# Patient Record
Sex: MALE | Race: WHITE | NOT HISPANIC OR LATINO | URBAN - METROPOLITAN AREA
[De-identification: names, ages, dates, MRNs, and addresses within clinical notes are randomized per-mention and may not be internally consistent; named-entity substitution may affect disease eponyms.]

---

## 2017-01-03 ENCOUNTER — EMERGENCY (EMERGENCY)
Facility: HOSPITAL | Age: 52
LOS: 1 days | Discharge: HOME | End: 2017-01-03
Admitting: EMERGENCY MEDICINE
Payer: MEDICARE

## 2017-01-03 VITALS
TEMPERATURE: 98 F | RESPIRATION RATE: 18 BRPM | SYSTOLIC BLOOD PRESSURE: 148 MMHG | HEART RATE: 95 BPM | DIASTOLIC BLOOD PRESSURE: 97 MMHG | OXYGEN SATURATION: 96 %

## 2017-01-03 PROCEDURE — 90792 PSYCH DIAG EVAL W/MED SRVCS: CPT

## 2017-01-03 RX ADMIN — Medication 2 MILLIGRAM(S): at 23:25

## 2017-01-03 NOTE — ED BEHAVIORAL HEALTH ASSESSMENT NOTE - DESCRIPTION
calm and cooperative, given PO medication with good effect. NIDDM Lives with family in private home, mother in assisted living facility

## 2017-01-03 NOTE — ED BEHAVIORAL HEALTH ASSESSMENT NOTE - SUICIDE PROTECTIVE FACTORS
Future oriented/Positive therapeutic relationships/Responsibility to family and others/Supportive social network or family/Identifies reasons for living

## 2017-01-03 NOTE — ED BEHAVIORAL HEALTH ASSESSMENT NOTE - RISK ASSESSMENT
low acute risk . started back on meds, in outpt tx, not abusing substances, not endorsing si/hi, voluntarily in treatment.   Protective factors: No access to firearms, supportive family, compliant with treatment, positive therapeutic interactions

## 2017-01-03 NOTE — ED ADULT TRIAGE NOTE - CHIEF COMPLAINT QUOTE
Pt comes in for mental health eval, reports he stopped taking his psych meds x2 months ago and was restarted on them today. Pt reports increased anxiety at home and wanted to be evaluated. Pt also with c/o right side hip and back pain x few hours.

## 2017-01-03 NOTE — ED BEHAVIORAL HEALTH ASSESSMENT NOTE - CURRENT MEDICATION
Haldol 5mg QAM, Haldol 10mg QHS, Paxil 20mg QHS, Wellbutrin XL 150mg QD, Wellbutrin XL 300mg QD, Amlodopine 10mg QD, Nicoderm CQ 21mg. Q24 hours, Levothyroxine 50mcgs QD, Metformin 500mg BID  (per prior records)   trilafon, seroquel

## 2017-01-03 NOTE — ED BEHAVIORAL HEALTH NOTE - BEHAVIORAL HEALTH NOTE
Spoke with Sister Brittany Arias ([phone])- She stated patient was fine this AM but then called her at 5pm stating he felt anxious. He admitted to not taking his medication and so they called Dr. Cook at the [facility]. Patient admitted to not taking medication for 2 months and so Dr. Cook recommended patient restart Seroquel at 100mg QHS and Trilafon 16mg QHS and see her on Thursday.  She said patient was fine with this plan. Then a few hours later her other sister called her stating the patient was extremely anxious and said he was afraid to be alone. Patient didn't make any suicidal or homicidal statements but stated due to his state of anxiety he was not in his right mind and felt afraid and so they came to the ER.     She has not witnessed nor has patient endorsed suicidal or homicidal statements, psychotic symptoms or manic symptoms. 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 Patient has been caring for self but has had a decreased appetite. He has chronic sleep issues. Sister has no imminent safety concerns and just wants patient to feel ok going home. She reports she would prefer patient not be admitted to [facility] because she wants him to be comfortable outside the hospital environment but if he needs to be admitted she is ok with this. She said it would be good if the patient could be given something to make him feel more calm otherwise he will likely return to the ER if discharged.

## 2017-01-03 NOTE — ED BEHAVIORAL HEALTH ASSESSMENT NOTE - SUMMARY
51 year old single, non-caregiver  male, domiciled with sister and brother in law in a private house in [facility],  being treated at [facility], history of Schizoaffective Disorder, depressive type R/O Major Depressive Disorder, Social Phobia medical history of NIDDM, Hypogammaglobulinemia, R/O Hypothyroidism, last hospitalized at [facility] in 2012, no history of suicide attempts, bib sister secondary to anxiety .   while anxious in the setting of noncompliance with meds, the pt responds well to po anxiolytic in ED, he is not suicidal/homicidal/aggressive/agitated, denies manic/psychotic sx's, denies feeling hopeless or suicidal. pt has outpatient treatment in place, has appt. with dr. mccauley in two days, has support system with his sisters, there is no elevated acute risk or danger, there is not an indication for hospitalization, pt is fit to be discharged and f/u as an outpt later this week.

## 2017-01-18 ENCOUNTER — APPOINTMENT (OUTPATIENT)
Dept: RHEUMATOLOGY | Facility: CLINIC | Age: 52
End: 2017-01-18

## 2017-02-13 ENCOUNTER — APPOINTMENT (OUTPATIENT)
Dept: ALLERGY | Facility: CLINIC | Age: 52
End: 2017-02-13

## 2017-02-15 ENCOUNTER — APPOINTMENT (OUTPATIENT)
Dept: RHEUMATOLOGY | Facility: CLINIC | Age: 52
End: 2017-02-15

## 2017-03-09 ENCOUNTER — APPOINTMENT (OUTPATIENT)
Dept: ALLERGY | Facility: CLINIC | Age: 52
End: 2017-03-09

## 2017-03-09 VITALS
WEIGHT: 220 LBS | HEART RATE: 72 BPM | DIASTOLIC BLOOD PRESSURE: 80 MMHG | HEIGHT: 74 IN | RESPIRATION RATE: 14 BRPM | BODY MASS INDEX: 28.23 KG/M2 | SYSTOLIC BLOOD PRESSURE: 130 MMHG

## 2017-03-09 RX ORDER — PAROXETINE HYDROCHLORIDE 40 MG/1
TABLET, FILM COATED ORAL
Refills: 0 | Status: ACTIVE | COMMUNITY

## 2017-03-09 RX ORDER — FENOFIBRATE 145 MG/1
TABLET ORAL
Refills: 0 | Status: ACTIVE | COMMUNITY

## 2017-03-15 ENCOUNTER — APPOINTMENT (OUTPATIENT)
Dept: RHEUMATOLOGY | Facility: CLINIC | Age: 52
End: 2017-03-15

## 2017-03-24 ENCOUNTER — APPOINTMENT (OUTPATIENT)
Dept: RHEUMATOLOGY | Facility: CLINIC | Age: 52
End: 2017-03-24

## 2017-03-26 ENCOUNTER — EMERGENCY (EMERGENCY)
Facility: HOSPITAL | Age: 52
LOS: 1 days | Discharge: HOME | End: 2017-03-26
Attending: EMERGENCY MEDICINE | Admitting: EMERGENCY MEDICINE
Payer: MEDICARE

## 2017-03-26 VITALS
HEART RATE: 101 BPM | OXYGEN SATURATION: 99 % | RESPIRATION RATE: 20 BRPM | DIASTOLIC BLOOD PRESSURE: 101 MMHG | SYSTOLIC BLOOD PRESSURE: 156 MMHG

## 2017-03-26 VITALS
TEMPERATURE: 98 F | RESPIRATION RATE: 16 BRPM | DIASTOLIC BLOOD PRESSURE: 97 MMHG | OXYGEN SATURATION: 100 % | SYSTOLIC BLOOD PRESSURE: 177 MMHG | HEART RATE: 104 BPM

## 2017-03-26 PROCEDURE — 90792 PSYCH DIAG EVAL W/MED SRVCS: CPT

## 2017-03-26 PROCEDURE — 93010 ELECTROCARDIOGRAM REPORT: CPT

## 2017-03-26 PROCEDURE — 99284 EMERGENCY DEPT VISIT MOD MDM: CPT | Mod: 25

## 2017-03-26 RX ADMIN — Medication 1 MILLIGRAM(S): at 10:30

## 2017-03-26 NOTE — ED ADULT TRIAGE NOTE - CHIEF COMPLAINT QUOTE
states" I want to see psychiatrist". feel very anxious. h/o psych, compliant with meds. denies SI/HI

## 2017-03-26 NOTE — ED BEHAVIORAL HEALTH ASSESSMENT NOTE - SUICIDE PROTECTIVE FACTORS
Responsibility to family and others/Positive therapeutic relationships/Identifies reasons for living/Future oriented/Supportive social network or family

## 2017-03-26 NOTE — ED ADULT NURSE NOTE - OBJECTIVE STATEMENT
Hx of BiPolar and MDD, states he is compliant with medications, presents stating "I feel like I'm going to die." Pt endorses no medical symptoms, unable to explain why he feels this way,  denies chest pain, SOB, n/v, dizziness, palpitations. Resp even and unlabored. Pt denies SI, HI, auditory or visual hallucinations.

## 2017-03-26 NOTE — ED PROVIDER NOTE - EYES, MLM
Clear bilaterally, pupils equal, round and reactive to light. CARMELO, EOMI. Neg: Clark Sign, Raccoon Eyes, otorhinorrhea.

## 2017-03-26 NOTE — ED BEHAVIORAL HEALTH ASSESSMENT NOTE - HPI (INCLUDE ILLNESS QUALITY, SEVERITY, DURATION, TIMING, CONTEXT, MODIFYING FACTORS, ASSOCIATED SIGNS AND SYMPTOMS)
51 year old single, non-caregiver  male, domiciled alone with both sister's support in a private house in [facility], history of paranoid schizophrenia, chronic SI, denies any attempts, being treated at the [facility], history of Schizoaffective Disorder, depressive type R/O Major Depressive Disorder, Social Phobia medical history of NIDDM, Hypogammaglobulinemia, R/O Hypothyroidism, last hospitalized at [facility] in 8/19/16-9/7/16, no history of suicide attempts, bib sister secondary to anxiety .   Sister Brittany Arias ([phone])- She stated patient was fine this AM but then called her at 6 AM stating he felt anxious. Sister reports that his Ativan was discontinued last month due to "overuse" and "so he shouldn't get addicted", she did not know if it was "ok" to give him a dose of Ativan this morning. Sister reports that patient had his monthly infusion this past Friday and as the nurse was pulling ou the IV "there was blood everywhere" and patient became "scared". Patient reports that during the infusion session he was told to follow up with a EKG, and since has been anxious "maybe there is something wrong with me". Patient reports smoking 2 packs of cigarette's a day and is anxious about his cardiac health and would like to have an EKG reporting that he would be more content if he knew the findings were negative. Patient is given PO Ativan 1 mg with a good effect. He is scheduled to see Dr. Cook on 3/30/17 for his monthly Haldol DEK which his sister reports she will take him to and "make sure he goes".   Sister has not witnessed nor has patient endorsed suicidal or homicidal statements, psychotic symptoms or manic symptoms. 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 Patient has been caring for self but has had a decreased appetite. He has chronic sleep issues. Sister has no imminent safety concerns and just wants patient to feel ok going home. She reports she would prefer patient not be admitted to [facility] because she wants him to be comfortable outside the hospital environment but if he needs to be admitted she is ok with this. She said it would be good if the patient could be given something to make him feel more calm otherwise he will likely return to the ER if discharged.    Patient endorses feeling anxious, is slightly hyperventilating but responds well to ativan. Although patient is ruminative about his "heart", he makes no complains of any S/S of chest pain or other discomfort. denies depressive/ manic/psychotic sx's; denies si/hi/ah/vh. future oriented and states he will f/u with dr cook later this week. 51 year old single, non-caregiver  male, domiciled alone with both sister's support in a private house in [facility], history of paranoid schizophrenia, chronic SI, denies any attempts, being treated at the [facility], history of Schizoaffective Disorder, depressive type R/O Major Depressive Disorder, Social Phobia medical history of NIDDM, Hypogammaglobulinemia, R/O Hypothyroidism, last hospitalized at [facility] in 8/19/16-9/7/16, no history of suicide attempts, bib sister secondary to anxiety .   Sister Brittany Arias ([phone])- She stated patient was fine this AM but then called her at 6 AM stating he felt anxious. Sister reports that his Ativan was discontinued last month due to "overuse" and "so he shouldn't get addicted", she did not know if it was "ok" to give him a dose of Ativan this morning. Sister reports that patient had his monthly infusion this past Friday and as the nurse was pulling ou the IV "there was blood everywhere" and patient became "scared". Patient reports that during the infusion session he was told to follow up with a EKG, and since has been anxious "maybe there is something wrong with me". Patient reports smoking 2 packs of cigarette's a day and is anxious about his cardiac health and would like to have an EKG reporting that he would be more content if he knew the findings were negative. Patient is given PO Ativan 1 mg with a good effect. He is scheduled to see Dr. Cook on 3/30/17 for his monthly Haldol DEK which his sister reports she will take him to and "make sure he goes". No c/o CP or SOB. Poor ADL's noted an dishevelled..  Sister has not witnessed nor has patient endorsed suicidal or homicidal statements, psychotic symptoms or manic symptoms. 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 Patient has been caring for self with some decline in ADL's.  but has had a decreased appetite. He has chronic sleep issues. Sister has no imminent safety concerns and just wants patient to feel ok going home. She reports she would prefer patient not be admitted to [facility] because she wants him to be comfortable outside the hospital environment.    Patient endorses feeling anxious, is slightly hyperventilating but responds well to ativan. Although patient is ruminative about his "heart", he makes no complains of any S/S of chest pain or other discomfort. denies depressive/ manic/psychotic sx's; denies si/hi/ah/vh. future oriented and states he will f/u with dr cook later this week.

## 2017-03-26 NOTE — ED PROVIDER NOTE - MEDICAL DECISION MAKING DETAILS
Psychiatric and medical clearance. Pt with h/o HTN, non-compliant. Tx not necessary at this time. Should call PMD and take  his prescribed Rx.

## 2017-03-26 NOTE — ED PROVIDER NOTE - OBJECTIVE STATEMENT
52 y/o male presents with c/o anxiety and nervousness. Denies any suicidal-homicidal ideation. Denies any audio-visual hallucination. Denies any subjective medical complaints. Denies c/o: fever, chills, N/V, c/p. PND, HESTER, decreasing exercise tolerance, syncope, SOB, trauma, visual disturbances or changes. Past h/o HTN, noncompliant with Rx.

## 2017-03-26 NOTE — ED BEHAVIORAL HEALTH ASSESSMENT NOTE - RISK ASSESSMENT
low acute risk . compliant with medication and treatment, in outpt tx, not abusing substances, not endorsing si/hi, voluntarily in treatment.   Protective factors: No access to firearms, supportive family, compliant with treatment, positive therapeutic interactions  Patient is not deemed a risk to himself or others at this time.

## 2017-03-26 NOTE — ED PROVIDER NOTE - NEUROLOGICAL, MLM
Alert and oriented, no focal deficits, no motor or sensory deficits. CN II-XII grossly intact without muscle, motor, sensory deficit, moves all extremities, neg drift, normal gait.

## 2017-03-26 NOTE — ED BEHAVIORAL HEALTH ASSESSMENT NOTE - SUMMARY
51 year old single, non-caregiver  male, domiciled alone with both sister's support in a private house in [facility], history of paranoid schizophrenia, chronic SI, denies any attempts, being treated at the [facility], history of Schizoaffective Disorder, depressive type R/O Major Depressive Disorder, Social Phobia medical history of NIDDM, Hypogammaglobulinemia, R/O Hypothyroidism, last hospitalized at [facility] in 8/19/16-9/7/16, no history of suicide attempts, bib sister secondary to anxiety .   Sister Brittany Arias ([phone])- She stated patient was fine this AM but then called her at 6 AM stating he felt anxious.  Patient is given PO Ativan 1 mg with a good effect. He is scheduled to see Dr. Cook on 3/30/17 for his monthly Haldol DEK which his sister reports she will take him to and "make sure he goes".   Sister has not witnessed nor has patient endorsed suicidal or homicidal statements, psychotic symptoms or manic symptoms. 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 Patient has been caring for self but has had a decreased appetite. He has chronic sleep issues. Sister has no imminent safety concerns and just wants patient to feel ok going home. She reports she would prefer patient not be admitted to [facility] because she wants him to be comfortable outside the hospital environment but if he needs to be admitted she is ok with this. She said it would be good if the patient could be given something to make him feel more calm otherwise he will likely return to the ER if discharged.    Patient endorses feeling anxious, is anxious but responds well to ativan. Although patient is ruminative about his "heart", he makes no complains of any S/S of chest pain or other discomfort. Patient is seen by medicine. denies depressive/ manic/psychotic sx's; denies si/hi/ah/vh. future oriented and states he will f/u with dr cook later this week. 51 year old single, non-caregiver  male, domiciled alone with both sister's support in a private house in [facility], history of paranoid schizophrenia, chronic SI, denies any attempts, being treated at the [facility], history of Schizoaffective Disorder, depressive type R/O Major Depressive Disorder, Social Phobia medical history of NIDDM, Hypogammaglobulinemia, R/O Hypothyroidism, last hospitalized at [facility] in 8/19/16-9/7/16, no history of suicide attempts, bib sister secondary to anxiety .   Sister Brittany Arias ([phone])- She stated patient was fine this AM but then called her at 6 AM stating he felt anxious.  Patient is given PO Ativan 1 mg with a good effect. He is scheduled to see Dr. Cook on 3/30/17 for his monthly Haldol DEK which his sister reports she will take him to and "make sure he goes".   Sister has not witnessed nor has patient endorsed suicidal or homicidal statements, psychotic symptoms or manic symptoms. 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 Patient has been caring for self but has had a decreased appetite. He has chronic sleep issues. Sister has no imminent safety concerns and just wants patient to feel ok going home. She reports she would prefer patient not be admitted to [facility] because she wants him to be comfortable outside the hospital environment.    Patient endorses feeling anxious, is anxious but responds well to ativan. Although patient is ruminative about his "heart", he makes no complains of any S/S of chest pain or other discomfort. Patient is seen by medicine. denies depressive/ manic/psychotic sx's; denies si/hi/ah/vh. future oriented and states he will f/u with dr cook later this week.

## 2017-03-26 NOTE — ED PROVIDER NOTE - ENMT, MLM
Airway patent, Nasal mucosa clear. Mouth with normal mucosa. Throat has no vesicles, no oropharyngeal exudates and uvula is midline. Neg intraoral blood. Neck supple, FROM, NT midline, thyroid

## 2017-03-26 NOTE — ED BEHAVIORAL HEALTH ASSESSMENT NOTE - CASE SUMMARY
52 yo male with schizoaffective d/o p/w increased anxiety due to medical issues. Patient calm in ER after getting one dose of ativan. No SI or HI. Poor ADL's. Compliant treatment and meds and f/u. No AVH. Baseline paranoia but no inc intensity, frequency or distress. No acute danger to self or others. In treatment with f/u this week. Will get haldol dec scheduled this week (second time) w/o missing doses. May be more anxious as at end of dec cycle. May consider inc paxil. F/u PMD for BP meds. Advised to return to ER or call 911 if feels worse and agreeable. Sister has no concerns about his safety or self harm. Appears at baseline per outpatient notes [facility] with some inc anxiety,

## 2017-03-26 NOTE — ED BEHAVIORAL HEALTH ASSESSMENT NOTE - DESCRIPTION
calm and cooperative, given PO medication with good effect. NIDDM Lives alone with his sisters involved in his care in a private home, mother in assisted living facility

## 2017-03-26 NOTE — ED BEHAVIORAL HEALTH ASSESSMENT NOTE - SAFETY PLAN DETAILS
Safety planning done with patient and family.  They deny having any firearms at home. They were advised to call 911 or take the patient to the nearest ER if patient's behavior worsened or if there are any safety concerns. All involved verbalized understanding.

## 2017-03-26 NOTE — ED BEHAVIORAL HEALTH ASSESSMENT NOTE - CURRENT MEDICATION
Haldol  mg monthly, Paxil 10mg QHS,  Amlodipine 10mg QD, Nicoderm CQ 21mg. Q24 hours, Levothyroxine 50mcgs QD, Metformin 500mg BID  (per prior records)   trilafon, Seroquel Haldol  mg monthly, Paxil 10mg QHS,  Amlodipine 10mg QD, Nicoderm CQ 21mg. Q24 hours, Levothyroxine 50mcgs QD, Metformin 500mg BID  (per prior records)   trilafon, Seroquel, wellbutrin

## 2017-03-27 ENCOUNTER — OUTPATIENT (OUTPATIENT)
Dept: OUTPATIENT SERVICES | Facility: HOSPITAL | Age: 52
LOS: 1 days | End: 2017-03-27
Payer: MEDICARE

## 2017-03-27 ENCOUNTER — APPOINTMENT (OUTPATIENT)
Dept: CT IMAGING | Facility: CLINIC | Age: 52
End: 2017-03-27

## 2017-03-27 DIAGNOSIS — Z00.8 ENCOUNTER FOR OTHER GENERAL EXAMINATION: ICD-10-CM

## 2017-03-27 PROCEDURE — 71250 CT THORAX DX C-: CPT

## 2017-03-29 ENCOUNTER — RESULT REVIEW (OUTPATIENT)
Age: 52
End: 2017-03-29

## 2017-04-20 ENCOUNTER — APPOINTMENT (OUTPATIENT)
Dept: RHEUMATOLOGY | Facility: CLINIC | Age: 52
End: 2017-04-20

## 2017-05-03 ENCOUNTER — APPOINTMENT (OUTPATIENT)
Dept: ALLERGY | Facility: CLINIC | Age: 52
End: 2017-05-03

## 2017-05-09 ENCOUNTER — OUTPATIENT (OUTPATIENT)
Dept: OUTPATIENT SERVICES | Facility: HOSPITAL | Age: 52
LOS: 1 days | End: 2017-05-09

## 2017-05-12 ENCOUNTER — OUTPATIENT (OUTPATIENT)
Dept: OUTPATIENT SERVICES | Facility: HOSPITAL | Age: 52
LOS: 1 days | End: 2017-05-12

## 2017-05-12 DIAGNOSIS — Z01.20 ENCOUNTER FOR DENTAL EXAMINATION AND CLEANING WITHOUT ABNORMAL FINDINGS: ICD-10-CM

## 2017-05-17 ENCOUNTER — MESSAGE (OUTPATIENT)
Age: 52
End: 2017-05-17

## 2017-05-18 ENCOUNTER — APPOINTMENT (OUTPATIENT)
Dept: RHEUMATOLOGY | Facility: CLINIC | Age: 52
End: 2017-05-18

## 2017-05-25 ENCOUNTER — APPOINTMENT (OUTPATIENT)
Dept: RHEUMATOLOGY | Facility: CLINIC | Age: 52
End: 2017-05-25

## 2017-05-26 ENCOUNTER — OUTPATIENT (OUTPATIENT)
Dept: OUTPATIENT SERVICES | Facility: HOSPITAL | Age: 52
LOS: 1 days | End: 2017-05-26

## 2017-05-26 VITALS
HEIGHT: 73.5 IN | TEMPERATURE: 98 F | WEIGHT: 227.96 LBS | HEART RATE: 76 BPM | DIASTOLIC BLOOD PRESSURE: 80 MMHG | SYSTOLIC BLOOD PRESSURE: 124 MMHG | RESPIRATION RATE: 17 BRPM

## 2017-05-26 DIAGNOSIS — J34.2 DEVIATED NASAL SEPTUM: Chronic | ICD-10-CM

## 2017-05-26 DIAGNOSIS — F91.9 CONDUCT DISORDER, UNSPECIFIED: ICD-10-CM

## 2017-05-26 DIAGNOSIS — K02.9 DENTAL CARIES, UNSPECIFIED: ICD-10-CM

## 2017-05-26 LAB
BUN SERPL-MCNC: 8 MG/DL — SIGNIFICANT CHANGE UP (ref 7–23)
CALCIUM SERPL-MCNC: 9.6 MG/DL — SIGNIFICANT CHANGE UP (ref 8.4–10.5)
CHLORIDE SERPL-SCNC: 96 MMOL/L — LOW (ref 98–107)
CO2 SERPL-SCNC: 27 MMOL/L — SIGNIFICANT CHANGE UP (ref 22–31)
CREAT SERPL-MCNC: 0.71 MG/DL — SIGNIFICANT CHANGE UP (ref 0.5–1.3)
GLUCOSE SERPL-MCNC: 82 MG/DL — SIGNIFICANT CHANGE UP (ref 70–99)
HBA1C BLD-MCNC: 5.9 % — HIGH (ref 4–5.6)
HCT VFR BLD CALC: 44.9 % — SIGNIFICANT CHANGE UP (ref 39–50)
HGB BLD-MCNC: 14.3 G/DL — SIGNIFICANT CHANGE UP (ref 13–17)
MCHC RBC-ENTMCNC: 25.8 PG — LOW (ref 27–34)
MCHC RBC-ENTMCNC: 31.8 % — LOW (ref 32–36)
MCV RBC AUTO: 81 FL — SIGNIFICANT CHANGE UP (ref 80–100)
PLATELET # BLD AUTO: 221 K/UL — SIGNIFICANT CHANGE UP (ref 150–400)
PMV BLD: 9.2 FL — SIGNIFICANT CHANGE UP (ref 7–13)
POTASSIUM SERPL-MCNC: 4.4 MMOL/L — SIGNIFICANT CHANGE UP (ref 3.5–5.3)
POTASSIUM SERPL-SCNC: 4.4 MMOL/L — SIGNIFICANT CHANGE UP (ref 3.5–5.3)
RBC # BLD: 5.54 M/UL — SIGNIFICANT CHANGE UP (ref 4.2–5.8)
RBC # FLD: 15.5 % — HIGH (ref 10.3–14.5)
SODIUM SERPL-SCNC: 137 MMOL/L — SIGNIFICANT CHANGE UP (ref 135–145)
WBC # BLD: 5.95 K/UL — SIGNIFICANT CHANGE UP (ref 3.8–10.5)
WBC # FLD AUTO: 5.95 K/UL — SIGNIFICANT CHANGE UP (ref 3.8–10.5)

## 2017-05-26 RX ORDER — SODIUM CHLORIDE 9 MG/ML
1000 INJECTION, SOLUTION INTRAVENOUS
Qty: 0 | Refills: 0 | Status: DISCONTINUED | OUTPATIENT
Start: 2017-06-09 | End: 2017-06-24

## 2017-05-26 NOTE — H&P PST ADULT - HISTORY OF PRESENT ILLNESS
Pt is a 51 yr old male scheduled for Restorations and Extractions with Dr White 6/9/17. Pt has multiple caries and hx of Bipolar Schizophrenic disorder and now wishes to have all teeth removed. Pt lives alone - sister accompanied and helped with MH. Pt stable on current meds. Pt hx of Hypogammaglobulenemia and followed by Allergist - tx for recent CT Scan and Dx of right lung bronchial disorder - as per sister. Pt to get MC from Dr Boxer . Pt is a 51 yr old male scheduled for Restorations and Extractions with Dr White 6/9/17. Pt has multiple caries and hx of Bipolar Schizophrenic disorder and now wishes to have all teeth removed. Pt lives alone - sister accompanied and helped with MH. Pt stable on current meds. Pt hx of Hypogammaglobulenemia and followed by Allergist - tx by allergist for abnormal lung findings by allergist last month - pt and sister unsure of dx - pt is long term smoker. Pt is poor historian. MC requested by Dr Boxer.

## 2017-05-26 NOTE — H&P PST ADULT - NEGATIVE NEUROLOGICAL SYMPTOMS
no difficulty walking/no generalized seizures/no headache/no syncope/no focal seizures/no confusion/no transient paralysis/no weakness/no paresthesias

## 2017-05-26 NOTE — H&P PST ADULT - PMH
Bipolar 1 disorder    IgG deficiency Bipolar 1 disorder    Dental caries    IgG deficiency    Tracheal/bronchial disease Bipolar 1 disorder    Bronchiectasis    Dental caries    DM (diabetes mellitus)    IgG deficiency    Smoker    Tracheal/bronchial disease

## 2017-05-26 NOTE — H&P PST ADULT - PROBLEM SELECTOR PLAN 1
Pt given pre-op instructions and Famotidine and verbalized understanding.  OR Booking notified of PCN/Amoxicillin allergy and RAVI precautions via fax.   Request MC from PCP and clearance from Allergist - forms given.

## 2017-05-26 NOTE — H&P PST ADULT - RESPIRATORY AND THORAX COMMENTS
Pt tx by allergist for pulmonary disorder - seen on CT scan last month - pt placed on Breo and Inhlaler Pt tx by allergist for pulmonary disorder - pt unsure dx.  - seen on CT scan last month - pt placed on Breo and Inhaler - pt uses rarely - pt is long term smoker

## 2017-05-26 NOTE — H&P PST ADULT - NSANTHOSAYNRD_GEN_A_CORE
No. RAVI screening performed.  STOP BANG Legend: 0-2 = LOW Risk; 3-4 = INTERMEDIATE Risk; 5-8 = HIGH Risk

## 2017-05-26 NOTE — H&P PST ADULT - SKIN COMMENTS
Raised skin mass on lateral aspect left LE - biopsy benign - pt to have removed after surgery - pt denies pain or drainage

## 2017-05-30 ENCOUNTER — APPOINTMENT (OUTPATIENT)
Dept: ALLERGY | Facility: CLINIC | Age: 52
End: 2017-05-30

## 2017-05-30 VITALS
SYSTOLIC BLOOD PRESSURE: 134 MMHG | HEART RATE: 80 BPM | WEIGHT: 220 LBS | BODY MASS INDEX: 28.23 KG/M2 | DIASTOLIC BLOOD PRESSURE: 80 MMHG | HEIGHT: 74 IN | RESPIRATION RATE: 14 BRPM

## 2017-05-30 DIAGNOSIS — Z01.20 ENCOUNTER FOR DENTAL EXAMINATION AND CLEANING WITHOUT ABNORMAL FINDINGS: ICD-10-CM

## 2017-06-02 ENCOUNTER — EMERGENCY (EMERGENCY)
Facility: HOSPITAL | Age: 52
LOS: 1 days | Discharge: HOME | End: 2017-06-02
Admitting: EMERGENCY MEDICINE
Payer: MEDICARE

## 2017-06-02 VITALS
SYSTOLIC BLOOD PRESSURE: 147 MMHG | RESPIRATION RATE: 16 BRPM | DIASTOLIC BLOOD PRESSURE: 98 MMHG | TEMPERATURE: 99 F | OXYGEN SATURATION: 97 % | HEART RATE: 90 BPM

## 2017-06-02 DIAGNOSIS — J34.2 DEVIATED NASAL SEPTUM: Chronic | ICD-10-CM

## 2017-06-02 PROCEDURE — 99214 OFFICE O/P EST MOD 30 MIN: CPT

## 2017-06-02 PROCEDURE — 99284 EMERGENCY DEPT VISIT MOD MDM: CPT

## 2017-06-02 PROCEDURE — 90792 PSYCH DIAG EVAL W/MED SRVCS: CPT | Mod: GC

## 2017-06-02 NOTE — ED BEHAVIORAL HEALTH NOTE - BEHAVIORAL HEALTH NOTE
ELIAN informed that pt is stable for d/c and safe to travel via [place]. ELIAN called [facility]  and spoke to Shankar who [facility] E[facility] 6pm trip # [phone].

## 2017-06-02 NOTE — ED BEHAVIORAL HEALTH ASSESSMENT NOTE - SUICIDE PROTECTIVE FACTORS
Identifies reasons for living/Positive therapeutic relationships/Future oriented/Responsibility to family and others/Supportive social network or family
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
## 2017-06-02 NOTE — ED BEHAVIORAL HEALTH ASSESSMENT NOTE - SAFETY PLAN DETAILS
Patient advised to call 911 or go to nearest ER if patient's behavior worsened or if there are any safety concerns. All involved verbalized understanding. Patient and sister advised to call 911 or go to nearest ER if patient's behavior worsened or if there are any safety concerns. All involved verbalized understanding.

## 2017-06-02 NOTE — ED BEHAVIORAL HEALTH ASSESSMENT NOTE - DESCRIPTION
calm and cooperative, complaint with interview. NIDDM, Hypogammaglobulinemia Lives alone with his sisters involved in his care in a private home, mother in assisted living facility
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
## 2017-06-02 NOTE — ED ADULT NURSE NOTE - PMH
Bipolar 1 disorder    Bronchiectasis    Dental caries    DM (diabetes mellitus)    IgG deficiency    Smoker    Tracheal/bronchial disease

## 2017-06-07 ENCOUNTER — APPOINTMENT (OUTPATIENT)
Dept: PLASTIC SURGERY | Facility: CLINIC | Age: 52
End: 2017-06-07

## 2017-06-09 ENCOUNTER — TRANSCRIPTION ENCOUNTER (OUTPATIENT)
Age: 52
End: 2017-06-09

## 2017-06-09 ENCOUNTER — OUTPATIENT (OUTPATIENT)
Dept: OUTPATIENT SERVICES | Facility: HOSPITAL | Age: 52
LOS: 1 days | Discharge: HOME | End: 2017-06-09
Payer: MEDICARE

## 2017-06-09 VITALS
DIASTOLIC BLOOD PRESSURE: 92 MMHG | HEART RATE: 74 BPM | OXYGEN SATURATION: 96 % | SYSTOLIC BLOOD PRESSURE: 148 MMHG | WEIGHT: 227.96 LBS | TEMPERATURE: 98 F | RESPIRATION RATE: 16 BRPM | HEIGHT: 73.5 IN

## 2017-06-09 VITALS
DIASTOLIC BLOOD PRESSURE: 101 MMHG | HEART RATE: 97 BPM | OXYGEN SATURATION: 98 % | RESPIRATION RATE: 16 BRPM | SYSTOLIC BLOOD PRESSURE: 150 MMHG

## 2017-06-09 DIAGNOSIS — F91.9 CONDUCT DISORDER, UNSPECIFIED: ICD-10-CM

## 2017-06-09 DIAGNOSIS — J34.2 DEVIATED NASAL SEPTUM: Chronic | ICD-10-CM

## 2017-06-09 RX ORDER — ALBUTEROL 90 UG/1
0 AEROSOL, METERED ORAL
Qty: 0 | Refills: 0 | COMMUNITY

## 2017-06-09 RX ORDER — ALBUTEROL 90 UG/1
2 AEROSOL, METERED ORAL EVERY 4 HOURS
Qty: 0 | Refills: 0 | Status: DISCONTINUED | OUTPATIENT
Start: 2017-06-09 | End: 2017-06-24

## 2017-06-09 RX ORDER — ECHINACEA PURPUREA EXTRACT 125 MG
400 TABLET ORAL
Qty: 0 | Refills: 0 | COMMUNITY

## 2017-06-09 RX ORDER — AMLODIPINE BESYLATE 2.5 MG/1
1 TABLET ORAL
Qty: 0 | Refills: 0 | COMMUNITY

## 2017-06-09 RX ORDER — AMLODIPINE BESYLATE 2.5 MG/1
2.5 TABLET ORAL DAILY
Qty: 0 | Refills: 0 | Status: DISCONTINUED | OUTPATIENT
Start: 2017-06-09 | End: 2017-06-24

## 2017-06-09 RX ORDER — FENTANYL CITRATE 50 UG/ML
25 INJECTION INTRAVENOUS
Qty: 0 | Refills: 0 | Status: DISCONTINUED | OUTPATIENT
Start: 2017-06-09 | End: 2017-06-09

## 2017-06-09 RX ADMIN — FENTANYL CITRATE 25 MICROGRAM(S): 50 INJECTION INTRAVENOUS at 11:15

## 2017-06-09 RX ADMIN — FENTANYL CITRATE 25 MICROGRAM(S): 50 INJECTION INTRAVENOUS at 11:20

## 2017-06-09 RX ADMIN — FENTANYL CITRATE 25 MICROGRAM(S): 50 INJECTION INTRAVENOUS at 11:10

## 2017-06-09 NOTE — ASU DISCHARGE PLAN (ADULT/PEDIATRIC). - SPECIAL INSTRUCTIONS
Elevate head 20 degrees, no forceful spitting or drinking through straws, bite on gauze for 20 minutes and change regularly for next 24 hours or until bleeding has stopped. Ice to face, 20 minutes on and 20 minutes off. Pain meds as needed.

## 2017-06-09 NOTE — ASU DISCHARGE PLAN (ADULT/PEDIATRIC). - MEDICATION SUMMARY - MEDICATIONS TO TAKE
I will START or STAY ON the medications listed below when I get home from the hospital:    Vitamin B-100  -- 1 tab(s) by mouth once a dayam  -- Indication: For vitamin deficiency    IGG  --  parenteral , once a month last dose 5/25/17  -- Indication: For hypogammaglobinemia    Tylenol 500 mg oral tablet  -- 2 tab(s) by mouth every 6 hours, As Needed  -- Indication: For post-op pain    benztropine 1 mg oral tablet  --  by mouth once a day (at bedtime)  -- Indication: For anxiety    Haldol Decanoate 100 mg/mL intramuscular solution  --  intramuscular once a month, lst dose 5/24/17  -- Indication: For anxiety    valerian oral capsule  -- 420 cap(s) by mouth once a day, last dose 4/17  -- Indication: For anxiety

## 2017-06-09 NOTE — ASU DISCHARGE PLAN (ADULT/PEDIATRIC). - INSTRUCTIONS
Soft foods, advance as tolerated. Nothing too hot, too cold, sour, or spicy. No foods with seeds or rice. Patient may have lukewarm soups, soft pasta, mashed potatoes, etc. Call clinic to make appointment.

## 2017-06-11 ENCOUNTER — EMERGENCY (EMERGENCY)
Facility: HOSPITAL | Age: 52
LOS: 1 days | Discharge: HOME | End: 2017-06-11
Admitting: EMERGENCY MEDICINE
Payer: MEDICARE

## 2017-06-11 VITALS
RESPIRATION RATE: 20 BRPM | HEART RATE: 94 BPM | OXYGEN SATURATION: 100 % | TEMPERATURE: 98 F | DIASTOLIC BLOOD PRESSURE: 100 MMHG | SYSTOLIC BLOOD PRESSURE: 150 MMHG

## 2017-06-11 DIAGNOSIS — J34.2 DEVIATED NASAL SEPTUM: Chronic | ICD-10-CM

## 2017-06-11 PROCEDURE — 99284 EMERGENCY DEPT VISIT MOD MDM: CPT

## 2017-06-11 RX ORDER — OXYCODONE HYDROCHLORIDE 5 MG/1
1 TABLET ORAL
Qty: 8 | Refills: 0
Start: 2017-06-11 | End: 2017-06-13

## 2017-06-11 NOTE — CONSULT NOTE ADULT - SUBJECTIVE AND OBJECTIVE BOX
Patient is a 51y old  Male who presents with sister for a chief complaint of "throbbing pain."    HPI: Sister reported that pt was seen in [facility] OR on Friday for full mouth exo. Sister stated that sinus precautions were instructed, in addition to usual post-op instructions. Reported that packing material fell out of socket on UL today, and pt was experiencing oral pain. Also stated that pt smoked cigarettes today. Pt reported throbbing pain on UL and UR, which started today. Rated current pain 1/10 (sister stated that pt had indicated greater pain at home). Pt has been taking abx as prescribed. Is scheduled for post-op F/U at [facility] dental tomorrow.     PAST MEDICAL & SURGICAL HISTORY: DM (diabetes mellitus); Smoker; Bronchiectasis; Dental caries; Tracheal/bronchial disease; IgG deficiency; Bipolar 1 disorder; Deviated septum    MEDICATIONS  (STANDING): benzotropine, haldol, IGG, valerian, vitamin B-100    Allergies: amoxicillin (Fever), pcn (Unknown)    Vital Signs Last 24 Hrs  T(C): 36.6, Max: 36.6 (06-11 @ 20:35)  T(F): 97.8, Max: 97.8 (06-11 @ 20:35)  HR: 94 (94 - 94)  BP: 150/100 (150/100 - 150/100)  BP(mean): --  RR: 20 (20 - 20)  SpO2: 100% (100% - 100%)    EOE:  TMJ WNL; Mandible FROM;  Facial bones and MOM grossly intact; ( - ) trismus; ( - ) LAD; ( - ) swelling; ( - ) asymmetry;  ( - ) palpation; ( - ) SOB;  ( - ) dysphagia; pt alert and cooperative.    IOE:  edentulous maxilla and mandible, with sutures and packing material present; hard/soft palate WNL;  tongue/FOM WNL;  labial/buccal mucosa (+) sutures, packing material in exo sites, exposed bone in socket #12 with missing packing material (Schneiderian membrane appears intact clinically); ( - ) palpation - #12 exo site, point tenderness;  ( - ) swelling; no purulence/drainage noted.     Radiographs: not indicated    ASSESSMENT: Dry socket #12    PROCEDURE: EOE, IOE. Exposed bone noted #12, with intact sinus membrane noted clinically. Gentle irrigation with sterile saline. Pt reported that he did not feel sensation of water in his nose during irrigation. Gently packed site with dry socket paste and iodoform gauze. POIG. Pt noted some relief of pain.    RECS: 1) pain meds as rx by ED. 2) no smoking, drinking through a straw, vigorous rinsing, or spitting. 3) F/U with [facility], as scheduled tomorrow. 4) if any pain, swelling, difficulty breathing or swallowing, return to [facility].    India Guzman, pager #[phone]

## 2017-06-11 NOTE — ED PROVIDER NOTE - PROGRESS NOTE DETAILS
GERMAN Lawson:  Socket packed.  Pt has appointment at dental clinic tomorrow.  Pt medically stable for discharge.

## 2017-06-11 NOTE — ED ADULT TRIAGE NOTE - CHIEF COMPLAINT QUOTE
alert no distress  c/o had oral surgery ( all teeth removed) on friday  and a plug came out  no c/o bleeding  states he has throbbing pain in mouth

## 2017-06-11 NOTE — ED PROVIDER NOTE - CARE PLAN
Principal Discharge DX:	Dry socket  Instructions for follow-up, activity and diet:	Rest, drink plenty of fluids.  Advance activity as tolerated.  Continue all previously prescribed medications as directed.  Take Percocet 325/5 once every 6 hours as needed for severe pain -- causes drowsiness; DO NOT drink alcohol, drive, or operate heavy machinery with this medication. Follow up in dental clinic tomorrow in 48-72 hours- bring copies of your results.  Return to the ER for worsening or persistent symptoms, and/or ANY NEW OR CONCERNING SYMPTOMS. If you have issues obtaining follow up, please call: [phone] to obtain a doctor or specialist who takes your insurance in your area.

## 2017-06-11 NOTE — ED PROVIDER NOTE - PLAN OF CARE
Rest, drink plenty of fluids.  Advance activity as tolerated.  Continue all previously prescribed medications as directed.  Take Percocet 325/5 once every 6 hours as needed for severe pain -- causes drowsiness; DO NOT drink alcohol, drive, or operate heavy machinery with this medication. Follow up in dental clinic tomorrow in 48-72 hours- bring copies of your results.  Return to the ER for worsening or persistent symptoms, and/or ANY NEW OR CONCERNING SYMPTOMS. If you have issues obtaining follow up, please call: [phone] to obtain a doctor or specialist who takes your insurance in your area.

## 2017-06-11 NOTE — ED PROVIDER NOTE - OBJECTIVE STATEMENT
Pt is a 52 y/o M smoker PMHx DM type 2, Dental caries, IgG deficiency; Bipolar 1 disorder; Deviated septum p/w dental pain today.  Pt states she had all teeth extracted 3 days ago for severe dental caries.  Pt notes packing fell out of gums today and endorsing pain to gums.  Denies any fevers, chills, numbness, weakness, purulent drainage, facial swelling.

## 2017-06-11 NOTE — ED PROVIDER NOTE - MEDICAL DECISION MAKING DETAILS
Pt is a 52 y/o M smoker PMHx DM type 2, Dental caries, IgG deficiency; Bipolar 1 disorder; Deviated septum p/w dental pain today -- dry socket, dental consult

## 2017-06-12 ENCOUNTER — APPOINTMENT (OUTPATIENT)
Dept: ALLERGY | Facility: CLINIC | Age: 52
End: 2017-06-12

## 2017-06-12 VITALS
SYSTOLIC BLOOD PRESSURE: 142 MMHG | WEIGHT: 220 LBS | RESPIRATION RATE: 14 BRPM | BODY MASS INDEX: 28.23 KG/M2 | DIASTOLIC BLOOD PRESSURE: 82 MMHG | HEIGHT: 74 IN | HEART RATE: 88 BPM

## 2017-06-14 ENCOUNTER — APPOINTMENT (OUTPATIENT)
Dept: RHEUMATOLOGY | Facility: CLINIC | Age: 52
End: 2017-06-14

## 2017-06-20 PROCEDURE — 99214 OFFICE O/P EST MOD 30 MIN: CPT
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
## 2017-06-25 NOTE — ED PROVIDER NOTE - MEDICAL DECISION MAKING DETAILS
52 y/o Schizoaffective Disorder, Social Phobia, NIDDM, Hypogammaglobulinemia, Hypothyroidism  FS glucose check.  Medical evaluation performed. There is no clinical evidence of intoxication or any acute medical problem requiring immediate intervention. Patient is awaiting psychiatric consultation. Final disposition will be determined by psychiatrist.

## 2017-07-13 ENCOUNTER — APPOINTMENT (OUTPATIENT)
Dept: RHEUMATOLOGY | Facility: CLINIC | Age: 52
End: 2017-07-13

## 2017-07-17 ENCOUNTER — APPOINTMENT (OUTPATIENT)
Dept: ALLERGY | Facility: CLINIC | Age: 52
End: 2017-07-17

## 2017-07-26 PROCEDURE — 99214 OFFICE O/P EST MOD 30 MIN: CPT

## 2017-08-10 ENCOUNTER — APPOINTMENT (OUTPATIENT)
Dept: RHEUMATOLOGY | Facility: CLINIC | Age: 52
End: 2017-08-10
Payer: MEDICARE

## 2017-08-10 PROCEDURE — 96365 THER/PROPH/DIAG IV INF INIT: CPT

## 2017-08-10 PROCEDURE — 96366 THER/PROPH/DIAG IV INF ADDON: CPT

## 2017-08-29 ENCOUNTER — OUTPATIENT (OUTPATIENT)
Dept: OUTPATIENT SERVICES | Facility: HOSPITAL | Age: 52
LOS: 1 days | End: 2017-08-29
Payer: MEDICARE

## 2017-08-29 DIAGNOSIS — J34.2 DEVIATED NASAL SEPTUM: Chronic | ICD-10-CM

## 2017-08-29 DIAGNOSIS — Z01.20 ENCOUNTER FOR DENTAL EXAMINATION AND CLEANING WITHOUT ABNORMAL FINDINGS: ICD-10-CM

## 2017-09-08 ENCOUNTER — APPOINTMENT (OUTPATIENT)
Dept: RHEUMATOLOGY | Facility: CLINIC | Age: 52
End: 2017-09-08
Payer: MEDICARE

## 2017-09-08 PROCEDURE — 96366 THER/PROPH/DIAG IV INF ADDON: CPT

## 2017-09-08 PROCEDURE — 36415 COLL VENOUS BLD VENIPUNCTURE: CPT

## 2017-09-08 PROCEDURE — 96365 THER/PROPH/DIAG IV INF INIT: CPT

## 2017-10-05 ENCOUNTER — APPOINTMENT (OUTPATIENT)
Dept: RHEUMATOLOGY | Facility: CLINIC | Age: 52
End: 2017-10-05
Payer: MEDICARE

## 2017-10-05 PROCEDURE — 96366 THER/PROPH/DIAG IV INF ADDON: CPT

## 2017-10-05 PROCEDURE — 90686 IIV4 VACC NO PRSV 0.5 ML IM: CPT

## 2017-10-05 PROCEDURE — G0008: CPT

## 2017-10-05 PROCEDURE — 96365 THER/PROPH/DIAG IV INF INIT: CPT

## 2017-10-09 ENCOUNTER — APPOINTMENT (OUTPATIENT)
Dept: ALLERGY | Facility: CLINIC | Age: 52
End: 2017-10-09
Payer: MEDICARE

## 2017-10-09 VITALS
DIASTOLIC BLOOD PRESSURE: 80 MMHG | RESPIRATION RATE: 14 BRPM | HEIGHT: 74 IN | HEART RATE: 80 BPM | WEIGHT: 220 LBS | BODY MASS INDEX: 28.23 KG/M2 | SYSTOLIC BLOOD PRESSURE: 138 MMHG

## 2017-10-09 PROCEDURE — 99214 OFFICE O/P EST MOD 30 MIN: CPT

## 2017-10-09 RX ORDER — IBUPROFEN 600 MG/1
600 TABLET, FILM COATED ORAL
Qty: 15 | Refills: 0 | Status: DISCONTINUED | COMMUNITY
Start: 2017-07-16

## 2017-10-09 RX ORDER — CEFPROZIL 500 MG/1
500 TABLET ORAL
Qty: 20 | Refills: 0 | Status: DISCONTINUED | COMMUNITY
Start: 2017-10-06 | End: 2017-10-09

## 2017-10-09 RX ORDER — AMLODIPINE BESYLATE 2.5 MG/1
2.5 TABLET ORAL
Qty: 30 | Refills: 0 | Status: DISCONTINUED | COMMUNITY
Start: 2016-10-18

## 2017-10-09 RX ORDER — PAROXETINE HYDROCHLORIDE 30 MG/1
30 TABLET, FILM COATED ORAL
Qty: 60 | Refills: 0 | Status: DISCONTINUED | COMMUNITY
Start: 2017-06-20

## 2017-10-09 RX ORDER — CLINDAMYCIN HYDROCHLORIDE 300 MG/1
300 CAPSULE ORAL
Qty: 42 | Refills: 0 | Status: DISCONTINUED | COMMUNITY
Start: 2017-06-09

## 2017-10-09 RX ORDER — LORAZEPAM 1 MG/1
1 TABLET ORAL
Qty: 15 | Refills: 0 | Status: DISCONTINUED | COMMUNITY
Start: 2017-05-07

## 2017-10-09 RX ORDER — AMOXICILLIN 875 MG/1
875 TABLET, FILM COATED ORAL
Qty: 20 | Refills: 0 | Status: DISCONTINUED | COMMUNITY
Start: 2017-04-27

## 2017-10-09 RX ORDER — OXYCODONE AND ACETAMINOPHEN 5; 325 MG/1; MG/1
5-325 TABLET ORAL
Qty: 8 | Refills: 0 | Status: DISCONTINUED | COMMUNITY
Start: 2017-06-12

## 2017-10-12 ENCOUNTER — APPOINTMENT (OUTPATIENT)
Dept: ALLERGY | Facility: CLINIC | Age: 52
End: 2017-10-12
Payer: MEDICARE

## 2017-10-26 ENCOUNTER — APPOINTMENT (OUTPATIENT)
Dept: ALLERGY | Facility: CLINIC | Age: 52
End: 2017-10-26
Payer: MEDICARE

## 2017-10-26 VITALS
RESPIRATION RATE: 14 BRPM | HEIGHT: 74 IN | WEIGHT: 220 LBS | HEART RATE: 80 BPM | DIASTOLIC BLOOD PRESSURE: 80 MMHG | BODY MASS INDEX: 28.23 KG/M2 | SYSTOLIC BLOOD PRESSURE: 138 MMHG

## 2017-10-26 PROCEDURE — 99214 OFFICE O/P EST MOD 30 MIN: CPT

## 2017-10-26 RX ORDER — CEFUROXIME AXETIL 500 MG/1
500 TABLET ORAL
Qty: 20 | Refills: 0 | Status: DISCONTINUED | COMMUNITY
Start: 2017-10-06 | End: 2017-10-26

## 2017-11-01 ENCOUNTER — APPOINTMENT (OUTPATIENT)
Dept: RHEUMATOLOGY | Facility: CLINIC | Age: 52
End: 2017-11-01
Payer: MEDICARE

## 2017-11-01 DIAGNOSIS — Z87.09 PERSONAL HISTORY OF OTHER DISEASES OF THE RESPIRATORY SYSTEM: ICD-10-CM

## 2017-11-01 PROCEDURE — 96365 THER/PROPH/DIAG IV INF INIT: CPT

## 2017-11-01 PROCEDURE — 96366 THER/PROPH/DIAG IV INF ADDON: CPT

## 2017-11-02 ENCOUNTER — APPOINTMENT (OUTPATIENT)
Dept: ALLERGY | Facility: CLINIC | Age: 52
End: 2017-11-02
Payer: MEDICARE

## 2017-11-02 VITALS
DIASTOLIC BLOOD PRESSURE: 90 MMHG | SYSTOLIC BLOOD PRESSURE: 140 MMHG | HEIGHT: 74 IN | BODY MASS INDEX: 28.23 KG/M2 | RESPIRATION RATE: 14 BRPM | WEIGHT: 220 LBS | HEART RATE: 80 BPM

## 2017-11-02 PROCEDURE — 99214 OFFICE O/P EST MOD 30 MIN: CPT

## 2017-12-01 ENCOUNTER — OUTPATIENT (OUTPATIENT)
Dept: OUTPATIENT SERVICES | Facility: HOSPITAL | Age: 52
LOS: 1 days | End: 2017-12-01

## 2017-12-01 ENCOUNTER — APPOINTMENT (OUTPATIENT)
Dept: RHEUMATOLOGY | Facility: CLINIC | Age: 52
End: 2017-12-01
Payer: MEDICARE

## 2017-12-01 DIAGNOSIS — J34.2 DEVIATED NASAL SEPTUM: Chronic | ICD-10-CM

## 2017-12-01 PROCEDURE — 36415 COLL VENOUS BLD VENIPUNCTURE: CPT

## 2017-12-01 PROCEDURE — 96366 THER/PROPH/DIAG IV INF ADDON: CPT

## 2017-12-01 PROCEDURE — 96365 THER/PROPH/DIAG IV INF INIT: CPT

## 2017-12-11 PROCEDURE — 99214 OFFICE O/P EST MOD 30 MIN: CPT

## 2017-12-15 DIAGNOSIS — R69 ILLNESS, UNSPECIFIED: ICD-10-CM

## 2017-12-29 ENCOUNTER — APPOINTMENT (OUTPATIENT)
Dept: RHEUMATOLOGY | Facility: CLINIC | Age: 52
End: 2017-12-29
Payer: MEDICARE

## 2017-12-29 PROCEDURE — 96366 THER/PROPH/DIAG IV INF ADDON: CPT

## 2017-12-29 PROCEDURE — 96365 THER/PROPH/DIAG IV INF INIT: CPT

## 2018-01-11 PROCEDURE — 99214 OFFICE O/P EST MOD 30 MIN: CPT

## 2018-01-25 ENCOUNTER — APPOINTMENT (OUTPATIENT)
Dept: RHEUMATOLOGY | Facility: CLINIC | Age: 53
End: 2018-01-25
Payer: MEDICARE

## 2018-01-25 PROCEDURE — 96366 THER/PROPH/DIAG IV INF ADDON: CPT

## 2018-01-25 PROCEDURE — 96365 THER/PROPH/DIAG IV INF INIT: CPT

## 2018-01-29 ENCOUNTER — APPOINTMENT (OUTPATIENT)
Dept: ALLERGY | Facility: CLINIC | Age: 53
End: 2018-01-29

## 2018-02-22 ENCOUNTER — APPOINTMENT (OUTPATIENT)
Dept: RHEUMATOLOGY | Facility: CLINIC | Age: 53
End: 2018-02-22
Payer: MEDICARE

## 2018-02-22 PROCEDURE — 96365 THER/PROPH/DIAG IV INF INIT: CPT

## 2018-02-22 PROCEDURE — 96366 THER/PROPH/DIAG IV INF ADDON: CPT

## 2018-03-01 ENCOUNTER — OUTPATIENT (OUTPATIENT)
Dept: OUTPATIENT SERVICES | Facility: HOSPITAL | Age: 53
LOS: 1 days | End: 2018-03-01
Payer: MEDICAID

## 2018-03-01 DIAGNOSIS — J34.2 DEVIATED NASAL SEPTUM: Chronic | ICD-10-CM

## 2018-03-08 ENCOUNTER — APPOINTMENT (OUTPATIENT)
Dept: ALLERGY | Facility: CLINIC | Age: 53
End: 2018-03-08
Payer: MEDICARE

## 2018-03-08 VITALS
SYSTOLIC BLOOD PRESSURE: 150 MMHG | HEIGHT: 74 IN | WEIGHT: 220 LBS | RESPIRATION RATE: 14 BRPM | DIASTOLIC BLOOD PRESSURE: 90 MMHG | HEART RATE: 80 BPM | BODY MASS INDEX: 28.23 KG/M2

## 2018-03-08 PROCEDURE — 99406 BEHAV CHNG SMOKING 3-10 MIN: CPT

## 2018-03-08 PROCEDURE — 99214 OFFICE O/P EST MOD 30 MIN: CPT | Mod: 25

## 2018-03-08 RX ORDER — AMOXICILLIN AND CLAVULANATE POTASSIUM 875; 125 MG/1; MG/1
875-125 TABLET, COATED ORAL
Qty: 28 | Refills: 0 | Status: DISCONTINUED | COMMUNITY
Start: 2018-01-23 | End: 2018-03-08

## 2018-03-08 RX ORDER — AMOXICILLIN AND CLAVULANATE POTASSIUM 875; 125 MG/1; MG/1
875-125 TABLET, COATED ORAL
Qty: 20 | Refills: 0 | Status: DISCONTINUED | COMMUNITY
Start: 2017-10-25 | End: 2018-03-08

## 2018-03-08 RX ORDER — HALOPERIDOL DECANOATE 100 MG/ML
100 INJECTION INTRAMUSCULAR
Qty: 5 | Refills: 0 | Status: ACTIVE | COMMUNITY
Start: 2018-02-02

## 2018-03-09 DIAGNOSIS — R69 ILLNESS, UNSPECIFIED: ICD-10-CM

## 2018-03-23 ENCOUNTER — APPOINTMENT (OUTPATIENT)
Dept: RHEUMATOLOGY | Facility: CLINIC | Age: 53
End: 2018-03-23
Payer: MEDICARE

## 2018-03-23 PROCEDURE — 36415 COLL VENOUS BLD VENIPUNCTURE: CPT

## 2018-03-23 PROCEDURE — 96365 THER/PROPH/DIAG IV INF INIT: CPT

## 2018-03-23 PROCEDURE — 96366 THER/PROPH/DIAG IV INF ADDON: CPT

## 2018-04-01 PROCEDURE — G9001: CPT

## 2018-04-19 ENCOUNTER — APPOINTMENT (OUTPATIENT)
Dept: RHEUMATOLOGY | Facility: CLINIC | Age: 53
End: 2018-04-19
Payer: MEDICARE

## 2018-04-19 PROCEDURE — 96366 THER/PROPH/DIAG IV INF ADDON: CPT

## 2018-04-19 PROCEDURE — 96365 THER/PROPH/DIAG IV INF INIT: CPT

## 2018-04-26 ENCOUNTER — EMERGENCY (EMERGENCY)
Facility: HOSPITAL | Age: 53
LOS: 1 days | Discharge: HOME | End: 2018-04-26
Admitting: EMERGENCY MEDICINE
Payer: MEDICARE

## 2018-04-26 VITALS
SYSTOLIC BLOOD PRESSURE: 160 MMHG | DIASTOLIC BLOOD PRESSURE: 93 MMHG | HEART RATE: 78 BPM | TEMPERATURE: 98 F | OXYGEN SATURATION: 100 % | RESPIRATION RATE: 18 BRPM

## 2018-04-26 DIAGNOSIS — J34.2 DEVIATED NASAL SEPTUM: Chronic | ICD-10-CM

## 2018-04-26 LAB
AMPHET UR-MCNC: NEGATIVE — SIGNIFICANT CHANGE UP
APPEARANCE UR: CLEAR — SIGNIFICANT CHANGE UP
BARBITURATES UR SCN-MCNC: NEGATIVE — SIGNIFICANT CHANGE UP
BENZODIAZ UR-MCNC: NEGATIVE — SIGNIFICANT CHANGE UP
BILIRUB UR-MCNC: NEGATIVE — SIGNIFICANT CHANGE UP
BLOOD UR QL VISUAL: NEGATIVE — SIGNIFICANT CHANGE UP
CANNABINOIDS UR-MCNC: NEGATIVE — SIGNIFICANT CHANGE UP
COCAINE METAB.OTHER UR-MCNC: NEGATIVE — SIGNIFICANT CHANGE UP
COLOR SPEC: SIGNIFICANT CHANGE UP
GLUCOSE UR-MCNC: NEGATIVE — SIGNIFICANT CHANGE UP
KETONES UR-MCNC: NEGATIVE — SIGNIFICANT CHANGE UP
LEUKOCYTE ESTERASE UR-ACNC: NEGATIVE — SIGNIFICANT CHANGE UP
METHADONE UR-MCNC: NEGATIVE — SIGNIFICANT CHANGE UP
NITRITE UR-MCNC: NEGATIVE — SIGNIFICANT CHANGE UP
NON-SQ EPI CELLS # UR AUTO: <1 — SIGNIFICANT CHANGE UP
OPIATES UR-MCNC: NEGATIVE — SIGNIFICANT CHANGE UP
OXYCODONE UR-MCNC: NEGATIVE — SIGNIFICANT CHANGE UP
PCP UR-MCNC: NEGATIVE — SIGNIFICANT CHANGE UP
PH UR: 7 — SIGNIFICANT CHANGE UP (ref 4.6–8)
PROT UR-MCNC: NEGATIVE MG/DL — SIGNIFICANT CHANGE UP
RBC CASTS # UR COMP ASSIST: SIGNIFICANT CHANGE UP (ref 0–?)
SP GR SPEC: 1 — LOW (ref 1–1.04)
SQUAMOUS # UR AUTO: SIGNIFICANT CHANGE UP
UROBILINOGEN FLD QL: NORMAL MG/DL — SIGNIFICANT CHANGE UP
WBC UR QL: SIGNIFICANT CHANGE UP (ref 0–?)

## 2018-04-26 PROCEDURE — 90792 PSYCH DIAG EVAL W/MED SRVCS: CPT

## 2018-04-26 PROCEDURE — 99284 EMERGENCY DEPT VISIT MOD MDM: CPT

## 2018-04-26 NOTE — ED BEHAVIORAL HEALTH NOTE - BEHAVIORAL HEALTH NOTE
Writer called [facility], [place], [phone], and spoke with Debbie, who provided invoice # [id number], for liver service, to be provided by [employer], [phone], with an ETA of 8:30PM, to his address, verified to be: [phone] [place] [id number].
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
## 2018-04-26 NOTE — ED PROVIDER NOTE - MEDICAL DECISION MAKING DETAILS
This is a 52 year old male PMHX Bipolar DM came in today for psych eval r/t medication noncompliance. Medical evaluation performed. There is no clinical evidence of intoxication or any acute medical problem requiring immediate intervention. Final disposition will be determined by psychiatrist.

## 2018-04-26 NOTE — ED BEHAVIORAL HEALTH ASSESSMENT NOTE - RISK ASSESSMENT
Pt has been mostly compliant with his medications and has supportive family. Pt has chronic guilt feelings and paranoia associated with it but denies suicidal or homicidal ideations, intent or plan at this time. Pt agrees to explore the topic that led to presentation with his therapist tomorrow. Pt is at low risk of self harm or danger to others and can be discharged home.
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
## 2018-04-26 NOTE — ED BEHAVIORAL HEALTH ASSESSMENT NOTE - DESCRIPTION
Lives alone with his sisters involved in his care in a private home, mother in assisted living facility calm and cooperative, complaint with interview. No medication given.     Vital Signs Last 24 Hrs  T(C): 36.7 (26 Apr 2018 14:06), Max: 36.7 (26 Apr 2018 14:06)  T(F): 98.1 (26 Apr 2018 14:06), Max: 98.1 (26 Apr 2018 14:06)  HR: 78 (26 Apr 2018 14:06) (78 - 78)  BP: 160/93 (26 Apr 2018 14:06) (160/93 - 160/93)  BP(mean): --  RR: 18 (26 Apr 2018 14:06) (18 - 18)  SpO2: 100% (26 Apr 2018 14:06) (100% - 100%) NIDDM, Hypogammaglobulinemia

## 2018-04-26 NOTE — ED ADULT TRIAGE NOTE - CHIEF COMPLAINT QUOTE
BIBEMS from street, pt states he's having auditory hallucinations, voices telling pt that they don't like him. Denies SI/HI/Substance/ETOH. Hx: anxiety, schizophrenia not compliant with meds. Pt calm in triage.

## 2018-04-26 NOTE — ED BEHAVIORAL HEALTH ASSESSMENT NOTE - SUICIDE PROTECTIVE FACTORS
Supportive social network or family/Identifies reasons for living/Future oriented/Positive therapeutic relationships/Responsibility to family and others

## 2018-05-16 ENCOUNTER — APPOINTMENT (OUTPATIENT)
Dept: RHEUMATOLOGY | Facility: CLINIC | Age: 53
End: 2018-05-16

## 2018-05-25 ENCOUNTER — APPOINTMENT (OUTPATIENT)
Dept: RHEUMATOLOGY | Facility: CLINIC | Age: 53
End: 2018-05-25
Payer: MEDICARE

## 2018-05-25 ENCOUNTER — LABORATORY RESULT (OUTPATIENT)
Age: 53
End: 2018-05-25

## 2018-05-25 PROCEDURE — 36415 COLL VENOUS BLD VENIPUNCTURE: CPT

## 2018-05-25 PROCEDURE — 96366 THER/PROPH/DIAG IV INF ADDON: CPT

## 2018-05-25 PROCEDURE — 96365 THER/PROPH/DIAG IV INF INIT: CPT

## 2018-06-05 ENCOUNTER — APPOINTMENT (OUTPATIENT)
Dept: ALLERGY | Facility: CLINIC | Age: 53
End: 2018-06-05

## 2018-06-21 ENCOUNTER — APPOINTMENT (OUTPATIENT)
Dept: RHEUMATOLOGY | Facility: CLINIC | Age: 53
End: 2018-06-21
Payer: MEDICARE

## 2018-06-21 PROCEDURE — 96366 THER/PROPH/DIAG IV INF ADDON: CPT

## 2018-06-21 PROCEDURE — 96365 THER/PROPH/DIAG IV INF INIT: CPT

## 2018-06-29 PROCEDURE — 99214 OFFICE O/P EST MOD 30 MIN: CPT

## 2018-07-02 ENCOUNTER — APPOINTMENT (OUTPATIENT)
Dept: ALLERGY | Facility: CLINIC | Age: 53
End: 2018-07-02
Payer: MEDICARE

## 2018-07-02 VITALS
DIASTOLIC BLOOD PRESSURE: 80 MMHG | HEART RATE: 80 BPM | SYSTOLIC BLOOD PRESSURE: 138 MMHG | BODY MASS INDEX: 28.23 KG/M2 | RESPIRATION RATE: 14 BRPM | HEIGHT: 74 IN | WEIGHT: 220 LBS

## 2018-07-02 PROCEDURE — 94060 EVALUATION OF WHEEZING: CPT

## 2018-07-02 PROCEDURE — 99214 OFFICE O/P EST MOD 30 MIN: CPT | Mod: 25

## 2018-07-02 RX ORDER — BUPROPION HYDROCHLORIDE 100 MG/1
TABLET, FILM COATED ORAL
Refills: 0 | Status: ACTIVE | COMMUNITY

## 2018-07-02 RX ORDER — AMOXICILLIN AND CLAVULANATE POTASSIUM 875; 125 MG/1; MG/1
875-125 TABLET, COATED ORAL
Qty: 20 | Refills: 0 | Status: DISCONTINUED | COMMUNITY
Start: 2018-04-11 | End: 2018-07-02

## 2018-07-12 ENCOUNTER — APPOINTMENT (OUTPATIENT)
Dept: RHEUMATOLOGY | Facility: CLINIC | Age: 53
End: 2018-07-12
Payer: MEDICARE

## 2018-07-12 PROCEDURE — 96365 THER/PROPH/DIAG IV INF INIT: CPT

## 2018-07-12 PROCEDURE — 96366 THER/PROPH/DIAG IV INF ADDON: CPT

## 2018-08-07 ENCOUNTER — APPOINTMENT (OUTPATIENT)
Dept: RHEUMATOLOGY | Facility: CLINIC | Age: 53
End: 2018-08-07

## 2018-08-08 PROBLEM — E11.9 TYPE 2 DIABETES MELLITUS WITHOUT COMPLICATIONS: Chronic | Status: ACTIVE | Noted: 2017-05-26

## 2018-08-08 PROBLEM — F17.200 NICOTINE DEPENDENCE, UNSPECIFIED, UNCOMPLICATED: Chronic | Status: ACTIVE | Noted: 2017-05-26

## 2018-08-16 ENCOUNTER — APPOINTMENT (OUTPATIENT)
Dept: RHEUMATOLOGY | Facility: CLINIC | Age: 53
End: 2018-08-16

## 2018-09-06 ENCOUNTER — LABORATORY RESULT (OUTPATIENT)
Age: 53
End: 2018-09-06

## 2018-09-06 ENCOUNTER — APPOINTMENT (OUTPATIENT)
Dept: RHEUMATOLOGY | Facility: CLINIC | Age: 53
End: 2018-09-06
Payer: MEDICARE

## 2018-09-06 PROCEDURE — 96365 THER/PROPH/DIAG IV INF INIT: CPT

## 2018-09-06 PROCEDURE — 36415 COLL VENOUS BLD VENIPUNCTURE: CPT

## 2018-09-06 PROCEDURE — 96366 THER/PROPH/DIAG IV INF ADDON: CPT

## 2018-09-20 ENCOUNTER — OTHER (OUTPATIENT)
Age: 53
End: 2018-09-20

## 2018-10-01 ENCOUNTER — APPOINTMENT (OUTPATIENT)
Dept: ALLERGY | Facility: CLINIC | Age: 53
End: 2018-10-01

## 2018-10-02 ENCOUNTER — APPOINTMENT (OUTPATIENT)
Dept: ALLERGY | Facility: CLINIC | Age: 53
End: 2018-10-02
Payer: MEDICARE

## 2018-10-02 VITALS
BODY MASS INDEX: 28.23 KG/M2 | RESPIRATION RATE: 14 BRPM | DIASTOLIC BLOOD PRESSURE: 80 MMHG | HEIGHT: 74 IN | HEART RATE: 72 BPM | WEIGHT: 220 LBS | SYSTOLIC BLOOD PRESSURE: 130 MMHG

## 2018-10-02 PROCEDURE — 99214 OFFICE O/P EST MOD 30 MIN: CPT | Mod: 25

## 2018-10-05 ENCOUNTER — APPOINTMENT (OUTPATIENT)
Dept: RHEUMATOLOGY | Facility: CLINIC | Age: 53
End: 2018-10-05
Payer: MEDICARE

## 2018-10-05 PROCEDURE — 96365 THER/PROPH/DIAG IV INF INIT: CPT

## 2018-10-05 PROCEDURE — 96366 THER/PROPH/DIAG IV INF ADDON: CPT

## 2018-10-24 ENCOUNTER — APPOINTMENT (OUTPATIENT)
Dept: ALLERGY | Facility: CLINIC | Age: 53
End: 2018-10-24
Payer: MEDICARE

## 2018-10-25 ENCOUNTER — APPOINTMENT (OUTPATIENT)
Dept: ALLERGY | Facility: CLINIC | Age: 53
End: 2018-10-25
Payer: MEDICARE

## 2018-10-25 VITALS
BODY MASS INDEX: 32.08 KG/M2 | WEIGHT: 250 LBS | RESPIRATION RATE: 16 BRPM | DIASTOLIC BLOOD PRESSURE: 80 MMHG | HEIGHT: 74 IN | HEART RATE: 72 BPM | SYSTOLIC BLOOD PRESSURE: 140 MMHG

## 2018-10-25 PROCEDURE — 99214 OFFICE O/P EST MOD 30 MIN: CPT | Mod: 25

## 2018-11-02 ENCOUNTER — APPOINTMENT (OUTPATIENT)
Dept: RHEUMATOLOGY | Facility: CLINIC | Age: 53
End: 2018-11-02

## 2018-11-29 ENCOUNTER — APPOINTMENT (OUTPATIENT)
Dept: ALLERGY | Facility: CLINIC | Age: 53
End: 2018-11-29

## 2018-11-30 ENCOUNTER — APPOINTMENT (OUTPATIENT)
Dept: RHEUMATOLOGY | Facility: CLINIC | Age: 53
End: 2018-11-30
Payer: MEDICARE

## 2018-11-30 PROCEDURE — 96366 THER/PROPH/DIAG IV INF ADDON: CPT

## 2018-11-30 PROCEDURE — 96365 THER/PROPH/DIAG IV INF INIT: CPT

## 2018-11-30 PROCEDURE — 36415 COLL VENOUS BLD VENIPUNCTURE: CPT

## 2018-12-11 ENCOUNTER — APPOINTMENT (OUTPATIENT)
Dept: ALLERGY | Facility: CLINIC | Age: 53
End: 2018-12-11

## 2018-12-12 ENCOUNTER — APPOINTMENT (OUTPATIENT)
Dept: ALLERGY | Facility: CLINIC | Age: 53
End: 2018-12-12
Payer: MEDICARE

## 2018-12-12 VITALS
BODY MASS INDEX: 32.08 KG/M2 | SYSTOLIC BLOOD PRESSURE: 120 MMHG | DIASTOLIC BLOOD PRESSURE: 80 MMHG | RESPIRATION RATE: 16 BRPM | HEART RATE: 72 BPM | WEIGHT: 250 LBS | HEIGHT: 74 IN

## 2018-12-12 PROCEDURE — 99214 OFFICE O/P EST MOD 30 MIN: CPT | Mod: 25

## 2018-12-28 ENCOUNTER — APPOINTMENT (OUTPATIENT)
Dept: RHEUMATOLOGY | Facility: CLINIC | Age: 53
End: 2018-12-28
Payer: MEDICARE

## 2018-12-28 PROCEDURE — 96365 THER/PROPH/DIAG IV INF INIT: CPT

## 2018-12-28 PROCEDURE — 96366 THER/PROPH/DIAG IV INF ADDON: CPT

## 2019-01-25 ENCOUNTER — APPOINTMENT (OUTPATIENT)
Dept: RHEUMATOLOGY | Facility: CLINIC | Age: 54
End: 2019-01-25

## 2019-01-30 ENCOUNTER — APPOINTMENT (OUTPATIENT)
Dept: ALLERGY | Facility: CLINIC | Age: 54
End: 2019-01-30
Payer: MEDICARE

## 2019-01-30 VITALS
BODY MASS INDEX: 32.08 KG/M2 | RESPIRATION RATE: 16 BRPM | HEIGHT: 74 IN | SYSTOLIC BLOOD PRESSURE: 160 MMHG | WEIGHT: 250 LBS | HEART RATE: 72 BPM | DIASTOLIC BLOOD PRESSURE: 84 MMHG

## 2019-01-30 PROCEDURE — 99214 OFFICE O/P EST MOD 30 MIN: CPT | Mod: 25

## 2019-01-30 RX ORDER — ALBUTEROL SULFATE 108 UG/1
108 (90 BASE) AEROSOL, METERED RESPIRATORY (INHALATION) EVERY 6 HOURS
Qty: 1 | Refills: 1 | Status: DISCONTINUED | COMMUNITY
Start: 2017-05-30 | End: 2019-01-30

## 2019-01-30 RX ORDER — AMOXICILLIN AND CLAVULANATE POTASSIUM 875; 125 MG/1; MG/1
875-125 TABLET, COATED ORAL
Qty: 20 | Refills: 0 | Status: DISCONTINUED | COMMUNITY
Start: 2018-10-23 | End: 2019-01-30

## 2019-01-30 RX ORDER — AMOXICILLIN AND CLAVULANATE POTASSIUM 875; 125 MG/1; MG/1
875-125 TABLET, COATED ORAL
Qty: 28 | Refills: 0 | Status: DISCONTINUED | COMMUNITY
Start: 2018-11-21 | End: 2019-01-30

## 2019-01-30 RX ORDER — RANITIDINE 150 MG/1
150 TABLET ORAL
Qty: 30 | Refills: 0 | Status: ACTIVE | COMMUNITY
Start: 2018-12-17

## 2019-01-30 RX ORDER — AMOXICILLIN AND CLAVULANATE POTASSIUM 875; 125 MG/1; MG/1
875-125 TABLET, COATED ORAL
Qty: 28 | Refills: 0 | Status: DISCONTINUED | COMMUNITY
Start: 2018-10-02 | End: 2019-01-30

## 2019-01-30 RX ORDER — ALBUTEROL SULFATE 108 UG/1
108 (90 BASE) AEROSOL, METERED RESPIRATORY (INHALATION) EVERY 6 HOURS
Qty: 1 | Refills: 2 | Status: DISCONTINUED | COMMUNITY
Start: 2018-10-25 | End: 2019-01-30

## 2019-01-30 NOTE — PHYSICAL EXAM
[Alert] : alert [Well Nourished] : well nourished [Healthy Appearance] : healthy appearance [No Acute Distress] : no acute distress [Well Developed] : well developed [Normal Voice/Communication] : normal voice communication [Conjunctival Erythema] : no conjunctival erythema [Suborbital Bogginess] : no suborbital bogginess (allergic shiners) [Normal Lips/Tongue] : the lips and tongue were normal [Normal Tonsils] : normal tonsils [No Oral Lesions or Ulcers] : no oral lesions or ulcers [No Neck Mass] : no neck mass was observed [No LAD] : no lymphadenopathy [No Thyroid Mass] : no thyroid mass [Supple] : the neck was supple [Normal Rate and Effort] : normal respiratory rhythm and effort [No Crackles] : no crackles [No Retractions] : no retractions [Bilateral Audible Breath Sounds] : bilateral audible breath sounds [Wheezing] : no wheezing was heard [Normal Rate] : heart rate was normal  [Normal S1, S2] : normal S1 and S2 [No murmur] : no murmur [Regular Rhythm] : with a regular rhythm [Normal Cervical Lymph Nodes] : cervical [Skin Intact] : skin intact  [No Rash] : no rash [No clubbing] : no clubbing [No Edema] : no edema [No Cyanosis] : no cyanosis [Judgment and Insight Age Appropriate] : judgement and insight is age appropriate [de-identified] : depressed affect

## 2019-01-30 NOTE — HISTORY OF PRESENT ILLNESS
[Allergic Rhinitis] : allergic rhinitis [Eczematous rashes] : eczematous rashes [Food Allergies] : food allergies [de-identified] : Patient down to 1/2 ppd of smoking - he is doing okay with the lowering of this dosage.     Patient has not had any recent IgG level done.  Patient missed one infusion secondary to car service showing up late and he was then denied his treatment.

## 2019-01-30 NOTE — ASSESSMENT
[FreeTextEntry1] : CVID\par \par Check IgG level \par Patient to contact infusion center to see if they can reschedule his missed treatment \par Chronic tobacco smoking - encouraged to cut down to 8 cigarettes per day \par RV 6 months or prn

## 2019-01-30 NOTE — SOCIAL HISTORY
[de-identified] : lives  [FreeTextEntry2] : on disability [House] : [unfilled] lives in a house  [Cat] : cat [Single] : single

## 2019-01-31 LAB — IGG SER QL IEP: 627 MG/DL

## 2019-02-01 ENCOUNTER — APPOINTMENT (OUTPATIENT)
Dept: RHEUMATOLOGY | Facility: CLINIC | Age: 54
End: 2019-02-01
Payer: MEDICARE

## 2019-02-01 PROCEDURE — 96365 THER/PROPH/DIAG IV INF INIT: CPT

## 2019-02-01 PROCEDURE — 96366 THER/PROPH/DIAG IV INF ADDON: CPT

## 2019-02-01 PROCEDURE — 36415 COLL VENOUS BLD VENIPUNCTURE: CPT

## 2019-02-22 ENCOUNTER — APPOINTMENT (OUTPATIENT)
Dept: RHEUMATOLOGY | Facility: CLINIC | Age: 54
End: 2019-02-22
Payer: MEDICARE

## 2019-02-22 PROCEDURE — 96366 THER/PROPH/DIAG IV INF ADDON: CPT

## 2019-02-22 PROCEDURE — 96365 THER/PROPH/DIAG IV INF INIT: CPT

## 2019-03-11 ENCOUNTER — APPOINTMENT (OUTPATIENT)
Dept: ALLERGY | Facility: CLINIC | Age: 54
End: 2019-03-11
Payer: MEDICARE

## 2019-03-11 VITALS
BODY MASS INDEX: 32.08 KG/M2 | HEIGHT: 74 IN | WEIGHT: 250 LBS | RESPIRATION RATE: 14 BRPM | HEART RATE: 92 BPM | DIASTOLIC BLOOD PRESSURE: 88 MMHG | SYSTOLIC BLOOD PRESSURE: 150 MMHG

## 2019-03-11 PROCEDURE — 99214 OFFICE O/P EST MOD 30 MIN: CPT

## 2019-03-11 NOTE — HISTORY OF PRESENT ILLNESS
[de-identified] : Patient has increased his cigarettes to 1 ppd - he has now ran out of money and he will try to stop smoking for the week.   IgG level has increased to 627 mg/dL.

## 2019-03-11 NOTE — PHYSICAL EXAM
[Alert] : alert [Well Nourished] : well nourished [Healthy Appearance] : healthy appearance [No Acute Distress] : no acute distress [Well Developed] : well developed [Normal Voice/Communication] : normal voice communication [Normal Lips/Tongue] : the lips and tongue were normal [Normal Tonsils] : normal tonsils [No Oral Lesions or Ulcers] : no oral lesions or ulcers [No Neck Mass] : no neck mass was observed [No LAD] : no lymphadenopathy [No Thyroid Mass] : no thyroid mass [Supple] : the neck was supple [Normal Rate and Effort] : normal respiratory rhythm and effort [No Crackles] : no crackles [No Retractions] : no retractions [Bilateral Audible Breath Sounds] : bilateral audible breath sounds [Normal Rate] : heart rate was normal  [Normal S1, S2] : normal S1 and S2 [No murmur] : no murmur [Regular Rhythm] : with a regular rhythm [Normal Cervical Lymph Nodes] : cervical [Skin Intact] : skin intact  [No Rash] : no rash [No clubbing] : no clubbing [No Edema] : no edema [No Cyanosis] : no cyanosis [Judgment and Insight Age Appropriate] : judgement and insight is age appropriate [Wheezing] : wheezing was heard [Conjunctival Erythema] : no conjunctival erythema [Suborbital Bogginess] : no suborbital bogginess (allergic shiners) [de-identified] : depressed affect

## 2019-03-11 NOTE — ASSESSMENT
[FreeTextEntry1] : CVID:\par \par Improved with restarting IVIG\par Continue present treatment\par \par Chronic tobacco abuse:\par \par Patient has promised me that by his next visit he will be down to 4 cigarettes per day.  \par \par Wheezing:\par \par Use Ventolin 2 puffs TID x 2 weeks.\par RV 4 months

## 2019-03-11 NOTE — SOCIAL HISTORY
[House] : [unfilled] lives in a house  [Cat] : cat [Single] : single [de-identified] : lives  [FreeTextEntry2] : on disability

## 2019-03-22 ENCOUNTER — APPOINTMENT (OUTPATIENT)
Dept: RHEUMATOLOGY | Facility: CLINIC | Age: 54
End: 2019-03-22

## 2019-03-26 ENCOUNTER — APPOINTMENT (OUTPATIENT)
Dept: RHEUMATOLOGY | Facility: CLINIC | Age: 54
End: 2019-03-26
Payer: MEDICARE

## 2019-03-26 PROCEDURE — 96366 THER/PROPH/DIAG IV INF ADDON: CPT

## 2019-03-26 PROCEDURE — 96365 THER/PROPH/DIAG IV INF INIT: CPT

## 2019-03-26 PROCEDURE — 36415 COLL VENOUS BLD VENIPUNCTURE: CPT

## 2019-03-28 ENCOUNTER — OTHER (OUTPATIENT)
Age: 54
End: 2019-03-28

## 2019-04-01 ENCOUNTER — APPOINTMENT (OUTPATIENT)
Dept: ALLERGY | Facility: CLINIC | Age: 54
End: 2019-04-01
Payer: MEDICARE

## 2019-04-01 VITALS
BODY MASS INDEX: 32.08 KG/M2 | RESPIRATION RATE: 14 BRPM | HEIGHT: 74 IN | DIASTOLIC BLOOD PRESSURE: 79 MMHG | HEART RATE: 93 BPM | WEIGHT: 250 LBS | SYSTOLIC BLOOD PRESSURE: 122 MMHG

## 2019-04-01 PROCEDURE — 99214 OFFICE O/P EST MOD 30 MIN: CPT

## 2019-04-01 NOTE — ASSESSMENT
[FreeTextEntry1] : Acute bronchitis:\par \par Augmentin 875 mg BID x 10 days\par Proventil 2 puffs QID prn \par \par CVID:\par \par Continue Gammagard infusions.

## 2019-04-01 NOTE — SOCIAL HISTORY
[House] : [unfilled] lives in a house  [Cat] : cat [Single] : single [de-identified] : lives  [FreeTextEntry2] : on disability

## 2019-04-01 NOTE — PHYSICAL EXAM
[Alert] : alert [Well Nourished] : well nourished [Healthy Appearance] : healthy appearance [No Acute Distress] : no acute distress [Well Developed] : well developed [Normal Voice/Communication] : normal voice communication [Normal Lips/Tongue] : the lips and tongue were normal [Normal Tonsils] : normal tonsils [No Oral Lesions or Ulcers] : no oral lesions or ulcers [Boggy Nasal Turbinates] : boggy and/or pale nasal turbinates [Posterior Pharyngeal Cobblestoning] : posterior pharyngeal cobblestoning [No Neck Mass] : no neck mass was observed [No LAD] : no lymphadenopathy [No Thyroid Mass] : no thyroid mass [Supple] : the neck was supple [Normal Rate and Effort] : normal respiratory rhythm and effort [No Crackles] : no crackles [No Retractions] : no retractions [Bilateral Audible Breath Sounds] : bilateral audible breath sounds [Wheezing] : wheezing was heard [Normal Rate] : heart rate was normal  [Normal S1, S2] : normal S1 and S2 [No murmur] : no murmur [Regular Rhythm] : with a regular rhythm [Normal Cervical Lymph Nodes] : cervical [Skin Intact] : skin intact  [No Rash] : no rash [No clubbing] : no clubbing [No Edema] : no edema [No Cyanosis] : no cyanosis [Judgment and Insight Age Appropriate] : judgement and insight is age appropriate [Conjunctival Erythema] : no conjunctival erythema [Suborbital Bogginess] : no suborbital bogginess (allergic shiners) [de-identified] : adentulous  [de-identified] : depressed affect

## 2019-04-01 NOTE — HISTORY OF PRESENT ILLNESS
[Eczematous rashes] : eczematous rashes [Venom Reactions] : venom reactions [Food Allergies] : food allergies [de-identified] : Congested in chest and sinuses x 3 days - scant amount of mucus - no fever - no body aches.    Smoking 3 cigarettes per day.

## 2019-04-15 ENCOUNTER — APPOINTMENT (OUTPATIENT)
Dept: ALLERGY | Facility: CLINIC | Age: 54
End: 2019-04-15
Payer: MEDICARE

## 2019-04-15 VITALS
WEIGHT: 253 LBS | HEIGHT: 74 IN | RESPIRATION RATE: 14 BRPM | DIASTOLIC BLOOD PRESSURE: 84 MMHG | HEART RATE: 80 BPM | SYSTOLIC BLOOD PRESSURE: 122 MMHG | BODY MASS INDEX: 32.47 KG/M2

## 2019-04-15 PROCEDURE — 99214 OFFICE O/P EST MOD 30 MIN: CPT

## 2019-04-15 NOTE — PHYSICAL EXAM
[Alert] : alert [Well Nourished] : well nourished [Healthy Appearance] : healthy appearance [No Acute Distress] : no acute distress [Well Developed] : well developed [Normal Voice/Communication] : normal voice communication [Conjunctival Erythema] : no conjunctival erythema [Suborbital Bogginess] : no suborbital bogginess (allergic shiners) [Normal Lips/Tongue] : the lips and tongue were normal [Normal Tonsils] : normal tonsils [No Oral Lesions or Ulcers] : no oral lesions or ulcers [Boggy Nasal Turbinates] : boggy and/or pale nasal turbinates [Posterior Pharyngeal Cobblestoning] : posterior pharyngeal cobblestoning [No Neck Mass] : no neck mass was observed [No LAD] : no lymphadenopathy [No Thyroid Mass] : no thyroid mass [Supple] : the neck was supple [Normal Rate and Effort] : normal respiratory rhythm and effort [No Crackles] : no crackles [No Retractions] : no retractions [Bilateral Audible Breath Sounds] : bilateral audible breath sounds [Wheezing] : wheezing was heard [Normal Rate] : heart rate was normal  [Normal S1, S2] : normal S1 and S2 [No murmur] : no murmur [Regular Rhythm] : with a regular rhythm [Normal Cervical Lymph Nodes] : cervical [Skin Intact] : skin intact  [No Rash] : no rash [No clubbing] : no clubbing [No Edema] : no edema [No Cyanosis] : no cyanosis [Judgment and Insight Age Appropriate] : judgement and insight is age appropriate [de-identified] : adentulous  [de-identified] : depressed affect

## 2019-04-15 NOTE — HISTORY OF PRESENT ILLNESS
[Allergic Rhinitis] : allergic rhinitis [Eczematous rashes] : eczematous rashes [Venom Reactions] : venom reactions [Food Allergies] : food allergies [de-identified] : Patient is stressed and he is now smoking 1/2 ppd and landlord threatening him about being evicted - patient taking Augmentin BID and he is much better.    He has Proventil and that has helped him as well.

## 2019-04-15 NOTE — ASSESSMENT
[FreeTextEntry1] : CVID - well controlled with Gammagard\par \par Recent bronchitis - resolved with Augmentin - complete course of antibiotics\par \par Tobacco addiction - attempt to decrease smoking \par \par RV 4 months or prn

## 2019-04-15 NOTE — SOCIAL HISTORY
[de-identified] : lives  [FreeTextEntry2] : on disability [House] : [unfilled] lives in a house  [Cat] : cat [Single] : single

## 2019-04-19 ENCOUNTER — APPOINTMENT (OUTPATIENT)
Dept: RHEUMATOLOGY | Facility: CLINIC | Age: 54
End: 2019-04-19
Payer: MEDICARE

## 2019-04-19 DIAGNOSIS — D80.1 NONFAMILIAL HYPOGAMMAGLOBULINEMIA: ICD-10-CM

## 2019-04-19 PROCEDURE — 96365 THER/PROPH/DIAG IV INF INIT: CPT

## 2019-04-19 PROCEDURE — 96366 THER/PROPH/DIAG IV INF ADDON: CPT

## 2019-04-19 PROCEDURE — 36415 COLL VENOUS BLD VENIPUNCTURE: CPT

## 2019-05-01 ENCOUNTER — APPOINTMENT (OUTPATIENT)
Dept: ALLERGY | Facility: CLINIC | Age: 54
End: 2019-05-01
Payer: MEDICARE

## 2019-05-01 VITALS
HEART RATE: 74 BPM | HEIGHT: 74 IN | SYSTOLIC BLOOD PRESSURE: 138 MMHG | DIASTOLIC BLOOD PRESSURE: 80 MMHG | WEIGHT: 250 LBS | RESPIRATION RATE: 14 BRPM | BODY MASS INDEX: 32.08 KG/M2

## 2019-05-01 PROCEDURE — 99214 OFFICE O/P EST MOD 30 MIN: CPT

## 2019-05-01 NOTE — ASSESSMENT
[FreeTextEntry1] : CVID - \par \par Continue present IVIG infusions\par \par Acute bronchitis/pharyngitis:\par \par Complete 10 day course of Augmentin \par \par Tobacco addiction:\par \par Reinforced tapering the number of daily cigarettes.

## 2019-05-01 NOTE — PHYSICAL EXAM
[Alert] : alert [Well Nourished] : well nourished [No Acute Distress] : no acute distress [Healthy Appearance] : healthy appearance [Conjunctival Erythema] : no conjunctival erythema [Normal Voice/Communication] : normal voice communication [Well Developed] : well developed [Suborbital Bogginess] : no suborbital bogginess (allergic shiners) [Normal Lips/Tongue] : the lips and tongue were normal [Normal Tonsils] : normal tonsils [No Oral Lesions or Ulcers] : no oral lesions or ulcers [Boggy Nasal Turbinates] : boggy and/or pale nasal turbinates [Posterior Pharyngeal Cobblestoning] : posterior pharyngeal cobblestoning [No Neck Mass] : no neck mass was observed [No LAD] : no lymphadenopathy [No Thyroid Mass] : no thyroid mass [Supple] : the neck was supple [Normal Rate and Effort] : normal respiratory rhythm and effort [No Crackles] : no crackles [No Retractions] : no retractions [Bilateral Audible Breath Sounds] : bilateral audible breath sounds [Normal S1, S2] : normal S1 and S2 [Wheezing] : wheezing was heard [Normal Rate] : heart rate was normal  [Regular Rhythm] : with a regular rhythm [No murmur] : no murmur [No Rash] : no rash [Skin Intact] : skin intact  [Normal Cervical Lymph Nodes] : cervical [No Edema] : no edema [No clubbing] : no clubbing [No Cyanosis] : no cyanosis [Judgment and Insight Age Appropriate] : judgement and insight is age appropriate [de-identified] : depressed affect

## 2019-05-01 NOTE — SOCIAL HISTORY
[de-identified] : lives  [FreeTextEntry2] : on disability [House] : [unfilled] lives in a house  [Cat] : cat [Single] : single

## 2019-05-01 NOTE — HISTORY OF PRESENT ILLNESS
[Eczematous rashes] : eczematous rashes [Venom Reactions] : venom reactions [Food Allergies] : food allergies [de-identified] : Sore throat with no fever, a sensation of food lodged with chest pressure lasted for 24 hours - constant feeling- resolved - sore throat resolved - he started Augmentin x 2 days - symptoms have resolved.   Patient smoking 1/2 ppd.

## 2019-05-13 ENCOUNTER — OTHER (OUTPATIENT)
Age: 54
End: 2019-05-13

## 2019-05-16 ENCOUNTER — OTHER (OUTPATIENT)
Age: 54
End: 2019-05-16

## 2019-05-17 ENCOUNTER — APPOINTMENT (OUTPATIENT)
Dept: RHEUMATOLOGY | Facility: CLINIC | Age: 54
End: 2019-05-17
Payer: MEDICARE

## 2019-05-17 PROCEDURE — 96366 THER/PROPH/DIAG IV INF ADDON: CPT

## 2019-05-17 PROCEDURE — 36415 COLL VENOUS BLD VENIPUNCTURE: CPT

## 2019-05-17 PROCEDURE — 96365 THER/PROPH/DIAG IV INF INIT: CPT

## 2019-06-14 ENCOUNTER — APPOINTMENT (OUTPATIENT)
Dept: RHEUMATOLOGY | Facility: CLINIC | Age: 54
End: 2019-06-14
Payer: MEDICARE

## 2019-06-14 PROCEDURE — 96365 THER/PROPH/DIAG IV INF INIT: CPT

## 2019-06-14 PROCEDURE — 96366 THER/PROPH/DIAG IV INF ADDON: CPT

## 2019-07-09 PROCEDURE — 99213 OFFICE O/P EST LOW 20 MIN: CPT

## 2019-07-12 ENCOUNTER — APPOINTMENT (OUTPATIENT)
Dept: RHEUMATOLOGY | Facility: CLINIC | Age: 54
End: 2019-07-12
Payer: MEDICARE

## 2019-07-12 PROCEDURE — 96366 THER/PROPH/DIAG IV INF ADDON: CPT

## 2019-07-12 PROCEDURE — 36415 COLL VENOUS BLD VENIPUNCTURE: CPT

## 2019-07-12 PROCEDURE — 96365 THER/PROPH/DIAG IV INF INIT: CPT

## 2019-07-15 ENCOUNTER — APPOINTMENT (OUTPATIENT)
Dept: ALLERGY | Facility: CLINIC | Age: 54
End: 2019-07-15

## 2019-07-22 ENCOUNTER — APPOINTMENT (OUTPATIENT)
Dept: ALLERGY | Facility: CLINIC | Age: 54
End: 2019-07-22

## 2019-07-29 ENCOUNTER — APPOINTMENT (OUTPATIENT)
Dept: ALLERGY | Facility: CLINIC | Age: 54
End: 2019-07-29
Payer: MEDICARE

## 2019-07-29 VITALS
WEIGHT: 243 LBS | RESPIRATION RATE: 14 BRPM | DIASTOLIC BLOOD PRESSURE: 98 MMHG | SYSTOLIC BLOOD PRESSURE: 140 MMHG | HEIGHT: 74 IN | BODY MASS INDEX: 31.18 KG/M2 | HEART RATE: 86 BPM | OXYGEN SATURATION: 96 %

## 2019-07-29 LAB — POTASSIUM SERPL-SCNC: 4.2 MMOL/L

## 2019-07-29 PROCEDURE — 99214 OFFICE O/P EST MOD 30 MIN: CPT

## 2019-07-29 RX ORDER — AMOXICILLIN AND CLAVULANATE POTASSIUM 875; 125 MG/1; MG/1
875-125 TABLET, COATED ORAL
Qty: 20 | Refills: 1 | Status: DISCONTINUED | COMMUNITY
Start: 2019-04-01 | End: 2019-07-29

## 2019-07-29 RX ORDER — ALBUTEROL SULFATE 90 UG/1
108 (90 BASE) AEROSOL, METERED RESPIRATORY (INHALATION)
Qty: 1 | Refills: 2 | Status: ACTIVE | COMMUNITY
Start: 2017-10-09 | End: 1900-01-01

## 2019-07-29 NOTE — ASSESSMENT
[FreeTextEntry1] : CVID \par \par Continue with present IVIG treatment\par RV 4 -6 months \par Continue with decreasing cigarette smoking

## 2019-07-29 NOTE — SOCIAL HISTORY
[de-identified] : lives  [FreeTextEntry2] : on disability [House] : [unfilled] lives in a house  [Cat] : cat [Single] : single

## 2019-07-29 NOTE — HISTORY OF PRESENT ILLNESS
[de-identified] : PCP found that he has low NA level from excessive fluids - he now has to limit his fluid intake.   Patient smoking 1/2 ppd.

## 2019-07-29 NOTE — PHYSICAL EXAM
[Well Nourished] : well nourished [Alert] : alert [Healthy Appearance] : healthy appearance [Well Developed] : well developed [No Acute Distress] : no acute distress [Normal Voice/Communication] : normal voice communication [Conjunctival Erythema] : no conjunctival erythema [Suborbital Bogginess] : no suborbital bogginess (allergic shiners) [Normal Lips/Tongue] : the lips and tongue were normal [No Oral Lesions or Ulcers] : no oral lesions or ulcers [Boggy Nasal Turbinates] : boggy and/or pale nasal turbinates [Normal Tonsils] : normal tonsils [No Neck Mass] : no neck mass was observed [Posterior Pharyngeal Cobblestoning] : posterior pharyngeal cobblestoning [No LAD] : no lymphadenopathy [Supple] : the neck was supple [No Thyroid Mass] : no thyroid mass [No Crackles] : no crackles [Normal Rate and Effort] : normal respiratory rhythm and effort [No Retractions] : no retractions [Wheezing] : wheezing was heard [Bilateral Audible Breath Sounds] : bilateral audible breath sounds [Normal S1, S2] : normal S1 and S2 [Normal Rate] : heart rate was normal  [No murmur] : no murmur [Regular Rhythm] : with a regular rhythm [Normal Cervical Lymph Nodes] : cervical [Skin Intact] : skin intact  [No Rash] : no rash [No clubbing] : no clubbing [No Edema] : no edema [No Cyanosis] : no cyanosis [Judgment and Insight Age Appropriate] : judgement and insight is age appropriate [de-identified] : depressed affect

## 2019-08-12 ENCOUNTER — APPOINTMENT (OUTPATIENT)
Dept: ALLERGY | Facility: CLINIC | Age: 54
End: 2019-08-12

## 2019-08-13 ENCOUNTER — APPOINTMENT (OUTPATIENT)
Dept: RHEUMATOLOGY | Facility: CLINIC | Age: 54
End: 2019-08-13
Payer: MEDICARE

## 2019-08-13 PROCEDURE — 96375 TX/PRO/DX INJ NEW DRUG ADDON: CPT

## 2019-08-13 PROCEDURE — 96366 THER/PROPH/DIAG IV INF ADDON: CPT

## 2019-08-13 PROCEDURE — 96365 THER/PROPH/DIAG IV INF INIT: CPT

## 2019-09-11 PROCEDURE — 99213 OFFICE O/P EST LOW 20 MIN: CPT

## 2019-09-13 ENCOUNTER — APPOINTMENT (OUTPATIENT)
Dept: RHEUMATOLOGY | Facility: CLINIC | Age: 54
End: 2019-09-13
Payer: MEDICARE

## 2019-09-13 PROCEDURE — 96366 THER/PROPH/DIAG IV INF ADDON: CPT

## 2019-09-13 PROCEDURE — 36415 COLL VENOUS BLD VENIPUNCTURE: CPT

## 2019-09-13 PROCEDURE — 96365 THER/PROPH/DIAG IV INF INIT: CPT

## 2019-10-11 ENCOUNTER — APPOINTMENT (OUTPATIENT)
Dept: RHEUMATOLOGY | Facility: CLINIC | Age: 54
End: 2019-10-11
Payer: MEDICARE

## 2019-10-11 PROCEDURE — 36415 COLL VENOUS BLD VENIPUNCTURE: CPT

## 2019-10-11 PROCEDURE — 96366 THER/PROPH/DIAG IV INF ADDON: CPT

## 2019-10-11 PROCEDURE — 96365 THER/PROPH/DIAG IV INF INIT: CPT

## 2019-10-23 PROCEDURE — 99212 OFFICE O/P EST SF 10 MIN: CPT

## 2019-10-29 NOTE — ED PROVIDER NOTE - PSYCHIATRIC MOOD
Subjective   Chief Complaint   Patient presents with   • Hypertension     B/p check       History of Present Illness     Pt is here today for fup on her htn. BP borderline a month ago asked her to check daily for 4 weeks and bring in her logs along with her BP cuff. Her BP has been in the 120's/70's consistently.      Patient Active Problem List   Diagnosis   • Benign essential hypertension   • Hyperlipidemia   • Hypothyroidism   • Lung mass   • Prediabetes   • Abnormal CBC   • Family history of colon cancer   • Colon cancer screening       No Known Allergies    Current Outpatient Medications on File Prior to Visit   Medication Sig Dispense Refill   • calcium carbonate (OS-EMILIA) 600 MG tablet Take 600 mg by mouth daily.     • Cholecalciferol (VITAMIN D3) 2000 UNITS tablet Take  by mouth.     • ESTRACE VAGINAL 0.1 MG/GM vaginal cream      • Glucosamine-Chondroit-Vit C-Mn (GLUCOSAMINE 1500 COMPLEX PO) Take  by mouth.     • levothyroxine (SYNTHROID, LEVOTHROID) 75 MCG tablet Take 1 tablet by mouth Daily. Do not substitute, branded only. 90 tablet 3   • lisinopril (PRINIVIL,ZESTRIL) 20 MG tablet Take 1 tablet by mouth Daily. 90 tablet 1   • psyllium (METAMUCIL) 58.6 % packet Take 1 packet by mouth daily.       No current facility-administered medications on file prior to visit.        Past Medical History:   Diagnosis Date   • Colon polyp    • Disease of thyroid gland    • Hyperlipidemia    • Hypertension    • Lung mass    • Tonsillitis        Family History   Problem Relation Age of Onset   • Heart valve disorder Mother    • Stroke Mother    • Coronary artery disease Father    • Macular degeneration Father    • Colon cancer Brother        Social History     Socioeconomic History   • Marital status: Single     Spouse name: Not on file   • Number of children: 2   • Years of education: Not on file   • Highest education level: Not on file   Occupational History   • Occupation: retired teacher   Tobacco Use   • Smoking  "status: Never Smoker   • Smokeless tobacco: Never Used   Substance and Sexual Activity   • Alcohol use: Yes     Comment: occassional    • Drug use: No   • Sexual activity: Defer       Past Surgical History:   Procedure Laterality Date   • COLONOSCOPY N/A 11/5/2018    Procedure: COLONOSCOPY TO CECUM;  Surgeon: Yelena Mcduffie MD;  Location: [facility] ENDOSCOPY;  Service: General   • HYSTERECTOMY     • MOHS SURGERY     • TONSILLECTOMY             The following portions of the patient's history were reviewed and updated as appropriate: problem list, allergies, current medications, past medical history, past family history, past social history and past surgical history.    Review of Systems   Constitution: Negative for malaise/fatigue.   Eyes: Negative for blurred vision.   Cardiovascular: Negative for chest pain, orthopnea, palpitations and paroxysmal nocturnal dyspnea.   Respiratory: Negative for shortness of breath.    Musculoskeletal: Negative for neck pain.   Neurological: Negative for headaches.       Immunization History   Administered Date(s) Administered   • Flu Mist 11/07/2013, 09/16/2014, 10/15/2015   • Flu Vaccine High Dose PF 65YR+ 10/12/2016, 10/19/2017   • Fluad Quad 10/01/2019   • Pneumococcal Conjugate 13-Valent (PCV13) 04/26/2016   • Pneumococcal Polysaccharide (PPSV23) 09/16/2014   • Shingrix 10/27/2019   • Tdap 01/01/2012       Objective   Vitals:    10/29/19 0931 10/29/19 1020   BP:  148/83   Weight: 73.9 kg (163 lb)    Height: 172.7 cm (67.99\")      Physical Exam   Constitutional: She appears well-developed and well-nourished.   Cardiovascular: Normal rate, regular rhythm and normal heart sounds.   Vitals reviewed.        Assessment/Plan   There are no diagnoses linked to this encounter.    147/86 with her cuff and 148/83 with my cuff today. She has been consistently in the 120's/70's outside the office, has white coat element to her htn. No changes in her meds, she will continue to monitor " periodically.       Return in about 7 months (around 5/29/2020).            AGITATED/ANXIOUS

## 2019-11-08 ENCOUNTER — APPOINTMENT (OUTPATIENT)
Dept: RHEUMATOLOGY | Facility: CLINIC | Age: 54
End: 2019-11-08
Payer: MEDICARE

## 2019-11-08 PROCEDURE — 96366 THER/PROPH/DIAG IV INF ADDON: CPT

## 2019-11-08 PROCEDURE — 36415 COLL VENOUS BLD VENIPUNCTURE: CPT

## 2019-11-08 PROCEDURE — 96365 THER/PROPH/DIAG IV INF INIT: CPT

## 2019-11-11 ENCOUNTER — APPOINTMENT (OUTPATIENT)
Dept: ALLERGY | Facility: CLINIC | Age: 54
End: 2019-11-11
Payer: MEDICARE

## 2019-11-11 VITALS
WEIGHT: 236 LBS | SYSTOLIC BLOOD PRESSURE: 120 MMHG | BODY MASS INDEX: 30.29 KG/M2 | OXYGEN SATURATION: 97 % | HEART RATE: 68 BPM | HEIGHT: 74 IN | RESPIRATION RATE: 14 BRPM | DIASTOLIC BLOOD PRESSURE: 80 MMHG

## 2019-11-11 PROCEDURE — 99214 OFFICE O/P EST MOD 30 MIN: CPT | Mod: 25

## 2019-11-11 PROCEDURE — 94060 EVALUATION OF WHEEZING: CPT

## 2019-11-11 RX ORDER — ALBUTEROL SULFATE 90 UG/1
108 (90 BASE) AEROSOL, METERED RESPIRATORY (INHALATION)
Qty: 1 | Refills: 2 | Status: DISCONTINUED | COMMUNITY
Start: 2018-10-25 | End: 2019-11-11

## 2019-11-11 NOTE — HISTORY OF PRESENT ILLNESS
[Allergic Rhinitis] : allergic rhinitis [Food Allergies] : food allergies [Venom Reactions] : venom reactions [Eczematous rashes] : eczematous rashes [de-identified] : Patient states he is going to quit smoking - he buys loose cigarettes - patient is not having any problems - he does have some coughing.  No recent infections - tolerating his infusions - he has used rescue inhaler around 1x per week.

## 2019-11-11 NOTE — PHYSICAL EXAM
[Alert] : alert [Well Nourished] : well nourished [Healthy Appearance] : healthy appearance [No Acute Distress] : no acute distress [Well Developed] : well developed [Normal Voice/Communication] : normal voice communication [Normal Lips/Tongue] : the lips and tongue were normal [Normal Tonsils] : normal tonsils [Boggy Nasal Turbinates] : boggy and/or pale nasal turbinates [No Oral Lesions or Ulcers] : no oral lesions or ulcers [No Neck Mass] : no neck mass was observed [Posterior Pharyngeal Cobblestoning] : posterior pharyngeal cobblestoning [No LAD] : no lymphadenopathy [Supple] : the neck was supple [No Thyroid Mass] : no thyroid mass [No Crackles] : no crackles [Normal Rate and Effort] : normal respiratory rhythm and effort [No Retractions] : no retractions [Bilateral Audible Breath Sounds] : bilateral audible breath sounds [Wheezing] : wheezing was heard [No murmur] : no murmur [Normal Rate] : heart rate was normal  [Normal S1, S2] : normal S1 and S2 [Normal Cervical Lymph Nodes] : cervical [Regular Rhythm] : with a regular rhythm [Skin Intact] : skin intact  [No Rash] : no rash [No Edema] : no edema [No Cyanosis] : no cyanosis [No clubbing] : no clubbing [Judgment and Insight Age Appropriate] : judgement and insight is age appropriate [Conjunctival Erythema] : no conjunctival erythema [de-identified] : adentulous  [Suborbital Bogginess] : no suborbital bogginess (allergic shiners) [de-identified] : depressed affect

## 2019-11-11 NOTE — ASSESSMENT
[FreeTextEntry1] : CVID - \par \par Patient is doing well with IVIG treatment with no recent infections\par He will RV for any recurrent infection\par \par Chronic cigarette dependency:\par \par Patient states he is running our of money and he is planning to stop by his next visit\par I have encouraged patient to keep with his promise - he is aware of the [facility] smoker's cessation program\par \par Mild intermittent asthma:\par \par Proair 2 puffs QID prn \par \par RV 4 months or prn

## 2019-12-06 ENCOUNTER — APPOINTMENT (OUTPATIENT)
Dept: RHEUMATOLOGY | Facility: CLINIC | Age: 54
End: 2019-12-06
Payer: MEDICARE

## 2019-12-06 PROCEDURE — 96366 THER/PROPH/DIAG IV INF ADDON: CPT

## 2019-12-06 PROCEDURE — 96365 THER/PROPH/DIAG IV INF INIT: CPT

## 2019-12-06 PROCEDURE — 36415 COLL VENOUS BLD VENIPUNCTURE: CPT

## 2019-12-18 PROCEDURE — 99212 OFFICE O/P EST SF 10 MIN: CPT

## 2020-01-08 ENCOUNTER — APPOINTMENT (OUTPATIENT)
Dept: RHEUMATOLOGY | Facility: CLINIC | Age: 55
End: 2020-01-08
Payer: MEDICARE

## 2020-01-08 PROCEDURE — 96366 THER/PROPH/DIAG IV INF ADDON: CPT

## 2020-01-08 PROCEDURE — 96365 THER/PROPH/DIAG IV INF INIT: CPT

## 2020-01-08 PROCEDURE — 36415 COLL VENOUS BLD VENIPUNCTURE: CPT

## 2020-01-29 PROCEDURE — 99212 OFFICE O/P EST SF 10 MIN: CPT

## 2020-02-07 ENCOUNTER — APPOINTMENT (OUTPATIENT)
Dept: RHEUMATOLOGY | Facility: CLINIC | Age: 55
End: 2020-02-07
Payer: MEDICARE

## 2020-02-07 PROCEDURE — 96366 THER/PROPH/DIAG IV INF ADDON: CPT

## 2020-02-07 PROCEDURE — 96365 THER/PROPH/DIAG IV INF INIT: CPT

## 2020-02-17 ENCOUNTER — APPOINTMENT (OUTPATIENT)
Dept: ALLERGY | Facility: CLINIC | Age: 55
End: 2020-02-17

## 2020-02-18 ENCOUNTER — APPOINTMENT (OUTPATIENT)
Dept: ALLERGY | Facility: CLINIC | Age: 55
End: 2020-02-18
Payer: MEDICARE

## 2020-02-18 VITALS
RESPIRATION RATE: 14 BRPM | SYSTOLIC BLOOD PRESSURE: 142 MMHG | WEIGHT: 230 LBS | OXYGEN SATURATION: 97 % | BODY MASS INDEX: 29.52 KG/M2 | DIASTOLIC BLOOD PRESSURE: 89 MMHG | HEIGHT: 74 IN | HEART RATE: 68 BPM

## 2020-02-18 DIAGNOSIS — R06.2 WHEEZING: ICD-10-CM

## 2020-02-18 PROCEDURE — 99214 OFFICE O/P EST MOD 30 MIN: CPT

## 2020-02-18 RX ORDER — ALBUTEROL SULFATE 90 UG/1
108 (90 BASE) AEROSOL, METERED RESPIRATORY (INHALATION)
Qty: 1 | Refills: 2 | Status: ACTIVE | COMMUNITY
Start: 2020-02-18 | End: 1900-01-01

## 2020-02-18 NOTE — HISTORY OF PRESENT ILLNESS
[Allergic Rhinitis] : allergic rhinitis [Eczematous rashes] : eczematous rashes [Venom Reactions] : venom reactions [Food Allergies] : food allergies [de-identified] : Patient is back to smoker 1 ppd - he cannot stop smoking - he tried the smoker's cessation program at [facility] but he could not quit.   No infections since last seen.   He has not had any recent infections.    IgG 617 mg/dL.   Patient is taking 600 mg/kg every 4 weeks of Gammagard.

## 2020-02-18 NOTE — PHYSICAL EXAM
[Alert] : alert [Well Nourished] : well nourished [Healthy Appearance] : healthy appearance [No Acute Distress] : no acute distress [Normal Voice/Communication] : normal voice communication [Well Developed] : well developed [Normal Lips/Tongue] : the lips and tongue were normal [Normal Tonsils] : normal tonsils [No Oral Lesions or Ulcers] : no oral lesions or ulcers [Boggy Nasal Turbinates] : boggy and/or pale nasal turbinates [Posterior Pharyngeal Cobblestoning] : posterior pharyngeal cobblestoning [No Neck Mass] : no neck mass was observed [No LAD] : no lymphadenopathy [Supple] : the neck was supple [No Thyroid Mass] : no thyroid mass [Normal Rate and Effort] : normal respiratory rhythm and effort [No Crackles] : no crackles [No Retractions] : no retractions [Bilateral Audible Breath Sounds] : bilateral audible breath sounds [Wheezing] : wheezing was heard [Normal Rate] : heart rate was normal  [Normal S1, S2] : normal S1 and S2 [No murmur] : no murmur [Normal Cervical Lymph Nodes] : cervical [Regular Rhythm] : with a regular rhythm [No Rash] : no rash [Skin Intact] : skin intact  [No clubbing] : no clubbing [No Edema] : no edema [Judgment and Insight Age Appropriate] : judgement and insight is age appropriate [No Cyanosis] : no cyanosis [Conjunctival Erythema] : no conjunctival erythema [Suborbital Bogginess] : no suborbital bogginess (allergic shiners) [de-identified] : mild exp wheezing  [de-identified] : adentulous  [de-identified] : depressed affect

## 2020-02-18 NOTE — HISTORY OF PRESENT ILLNESS
[Allergic Rhinitis] : allergic rhinitis [Eczematous rashes] : eczematous rashes [Venom Reactions] : venom reactions [Food Allergies] : food allergies [de-identified] : Patient is back to smoker 1 ppd - he cannot stop smoking - he tried the smoker's cessation program at [facility] but he could not quit.   No infections since last seen.   He has not had any recent infections.    IgG 617 mg/dL.   Patient is taking 600 mg/kg every 4 weeks of Gammagard.

## 2020-02-18 NOTE — PHYSICAL EXAM
[Alert] : alert [Healthy Appearance] : healthy appearance [Well Nourished] : well nourished [No Acute Distress] : no acute distress [Normal Voice/Communication] : normal voice communication [Well Developed] : well developed [Normal Lips/Tongue] : the lips and tongue were normal [Normal Tonsils] : normal tonsils [No Oral Lesions or Ulcers] : no oral lesions or ulcers [Posterior Pharyngeal Cobblestoning] : posterior pharyngeal cobblestoning [Boggy Nasal Turbinates] : boggy and/or pale nasal turbinates [No LAD] : no lymphadenopathy [No Neck Mass] : no neck mass was observed [Supple] : the neck was supple [No Thyroid Mass] : no thyroid mass [Normal Rate and Effort] : normal respiratory rhythm and effort [No Crackles] : no crackles [No Retractions] : no retractions [Bilateral Audible Breath Sounds] : bilateral audible breath sounds [Normal Rate] : heart rate was normal  [Wheezing] : wheezing was heard [No murmur] : no murmur [Normal S1, S2] : normal S1 and S2 [Regular Rhythm] : with a regular rhythm [Normal Cervical Lymph Nodes] : cervical [No Rash] : no rash [Skin Intact] : skin intact  [No Edema] : no edema [No clubbing] : no clubbing [No Cyanosis] : no cyanosis [Judgment and Insight Age Appropriate] : judgement and insight is age appropriate [Conjunctival Erythema] : no conjunctival erythema [Suborbital Bogginess] : no suborbital bogginess (allergic shiners) [de-identified] : adentulous  [de-identified] : mild exp wheezing  [de-identified] : depressed affect

## 2020-02-18 NOTE — ASSESSMENT
[FreeTextEntry1] : CVID:\par \par Patient is presently being treated with Gammagard 600 mg/kg every 4 weeks - his IgG is 617 mg/dL and he has remained without any recent infections. \par \par Tobacco addiction:  I have discussed the absolute need for him to pursue options for smoking cessation and he will discuss with his PCP.  \par \par Mild intermittent asthma:\par \par Albuterol HFA 2 puffs QID prn \par \par RV 4 months

## 2020-03-06 ENCOUNTER — APPOINTMENT (OUTPATIENT)
Dept: RHEUMATOLOGY | Facility: CLINIC | Age: 55
End: 2020-03-06
Payer: MEDICARE

## 2020-03-06 ENCOUNTER — LABORATORY RESULT (OUTPATIENT)
Age: 55
End: 2020-03-06

## 2020-03-06 PROCEDURE — 96366 THER/PROPH/DIAG IV INF ADDON: CPT

## 2020-03-06 PROCEDURE — 96365 THER/PROPH/DIAG IV INF INIT: CPT

## 2020-03-24 PROCEDURE — 99212 OFFICE O/P EST SF 10 MIN: CPT

## 2020-03-30 ENCOUNTER — APPOINTMENT (OUTPATIENT)
Dept: RHEUMATOLOGY | Facility: CLINIC | Age: 55
End: 2020-03-30

## 2020-04-02 ENCOUNTER — APPOINTMENT (OUTPATIENT)
Dept: RHEUMATOLOGY | Facility: CLINIC | Age: 55
End: 2020-04-02
Payer: MEDICARE

## 2020-04-02 PROCEDURE — 96366 THER/PROPH/DIAG IV INF ADDON: CPT

## 2020-04-02 PROCEDURE — 96365 THER/PROPH/DIAG IV INF INIT: CPT

## 2020-04-30 ENCOUNTER — APPOINTMENT (OUTPATIENT)
Dept: RHEUMATOLOGY | Facility: CLINIC | Age: 55
End: 2020-04-30
Payer: MEDICARE

## 2020-04-30 PROCEDURE — 96365 THER/PROPH/DIAG IV INF INIT: CPT

## 2020-04-30 PROCEDURE — 96366 THER/PROPH/DIAG IV INF ADDON: CPT

## 2020-05-18 PROCEDURE — 99212 OFFICE O/P EST SF 10 MIN: CPT

## 2020-05-28 ENCOUNTER — APPOINTMENT (OUTPATIENT)
Dept: RHEUMATOLOGY | Facility: CLINIC | Age: 55
End: 2020-05-28
Payer: MEDICARE

## 2020-05-28 PROCEDURE — 96365 THER/PROPH/DIAG IV INF INIT: CPT

## 2020-05-28 PROCEDURE — 96366 THER/PROPH/DIAG IV INF ADDON: CPT

## 2020-06-22 PROCEDURE — 99442: CPT | Mod: 95

## 2020-06-25 ENCOUNTER — LABORATORY RESULT (OUTPATIENT)
Age: 55
End: 2020-06-25

## 2020-06-25 ENCOUNTER — APPOINTMENT (OUTPATIENT)
Dept: RHEUMATOLOGY | Facility: CLINIC | Age: 55
End: 2020-06-25
Payer: MEDICARE

## 2020-06-25 PROCEDURE — 36415 COLL VENOUS BLD VENIPUNCTURE: CPT

## 2020-06-25 PROCEDURE — 96365 THER/PROPH/DIAG IV INF INIT: CPT

## 2020-06-25 PROCEDURE — 96366 THER/PROPH/DIAG IV INF ADDON: CPT

## 2020-07-01 ENCOUNTER — APPOINTMENT (OUTPATIENT)
Dept: ALLERGY | Facility: CLINIC | Age: 55
End: 2020-07-01

## 2020-07-21 ENCOUNTER — APPOINTMENT (OUTPATIENT)
Dept: RHEUMATOLOGY | Facility: CLINIC | Age: 55
End: 2020-07-21
Payer: MEDICARE

## 2020-07-21 PROCEDURE — 96366 THER/PROPH/DIAG IV INF ADDON: CPT

## 2020-07-21 PROCEDURE — 96365 THER/PROPH/DIAG IV INF INIT: CPT

## 2020-08-10 ENCOUNTER — APPOINTMENT (OUTPATIENT)
Dept: ALLERGY | Facility: CLINIC | Age: 55
End: 2020-08-10

## 2020-08-13 ENCOUNTER — APPOINTMENT (OUTPATIENT)
Dept: ALLERGY | Facility: CLINIC | Age: 55
End: 2020-08-13
Payer: MEDICARE

## 2020-08-13 DIAGNOSIS — N52.9 MALE ERECTILE DYSFUNCTION, UNSPECIFIED: ICD-10-CM

## 2020-08-13 PROCEDURE — 99214 OFFICE O/P EST MOD 30 MIN: CPT

## 2020-08-13 NOTE — HISTORY OF PRESENT ILLNESS
[Eczematous rashes] : eczematous rashes [Allergic Rhinitis] : allergic rhinitis [Venom Reactions] : venom reactions [Food Allergies] : food allergies [de-identified] : Patient was admitted to [facility] for hemoptysis beginning of July felt to be secondary to a bronchial infection.   CT of chest was performed and he was told that there were no abnormalities.   Smoking 1 ppd since he was in the hospital - he says he cannot stop smoking.    He coughed up 4 oz of dark red blood.    He has seen pulmonary MD and was advised fu in 3 months and stop smoking.

## 2020-08-13 NOTE — PHYSICAL EXAM
[Alert] : alert [Well Nourished] : well nourished [Healthy Appearance] : healthy appearance [No Acute Distress] : no acute distress [Well Developed] : well developed [Normal Voice/Communication] : normal voice communication [Normal Lips/Tongue] : the lips and tongue were normal [Normal Tonsils] : normal tonsils [No Oral Lesions or Ulcers] : no oral lesions or ulcers [Boggy Nasal Turbinates] : boggy and/or pale nasal turbinates [Posterior Pharyngeal Cobblestoning] : posterior pharyngeal cobblestoning [No Neck Mass] : no neck mass was observed [No LAD] : no lymphadenopathy [No Thyroid Mass] : no thyroid mass [Normal Rate and Effort] : normal respiratory rhythm and effort [Supple] : the neck was supple [No Retractions] : no retractions [No Crackles] : no crackles [Bilateral Audible Breath Sounds] : bilateral audible breath sounds [Wheezing] : wheezing was heard [Normal Rate] : heart rate was normal  [Normal S1, S2] : normal S1 and S2 [No murmur] : no murmur [Regular Rhythm] : with a regular rhythm [Normal Cervical Lymph Nodes] : cervical [No Rash] : no rash [Skin Intact] : skin intact  [No clubbing] : no clubbing [No Cyanosis] : no cyanosis [No Edema] : no edema [Judgment and Insight Age Appropriate] : judgement and insight is age appropriate [Conjunctival Erythema] : no conjunctival erythema [Suborbital Bogginess] : no suborbital bogginess (allergic shiners) [de-identified] : depressed affect

## 2020-08-13 NOTE — ASSESSMENT
[FreeTextEntry1] : CVID:\par \par Continue with IVIG treatment\par \par Hemoptysis:\par \par HE MUST STOP smoking!!!\par \par Erectile dysfunction:\par \par Obtain testosterone level

## 2020-08-13 NOTE — SOCIAL HISTORY
[House] : [unfilled] lives in a house  [Cat] : cat [Single] : single [de-identified] : lives  [FreeTextEntry2] : on disability

## 2020-08-20 ENCOUNTER — APPOINTMENT (OUTPATIENT)
Dept: RHEUMATOLOGY | Facility: CLINIC | Age: 55
End: 2020-08-20
Payer: MEDICARE

## 2020-08-20 PROCEDURE — 96366 THER/PROPH/DIAG IV INF ADDON: CPT

## 2020-08-20 PROCEDURE — 96365 THER/PROPH/DIAG IV INF INIT: CPT

## 2020-08-20 PROCEDURE — 36415 COLL VENOUS BLD VENIPUNCTURE: CPT

## 2020-09-11 NOTE — ED BEHAVIORAL HEALTH ASSESSMENT NOTE - VETERAN
Clinical Pharmacy Note  Heparin Dosing Consult    Agnieszka Larios is a 54 y.o. female ordered heparin per low dose nomogram by Dr. Phoebe Blake. Lab Results   Component Value Date    APTT 46.5 09/11/2020     Lab Results   Component Value Date    HGB 10.7 09/11/2020    HCT 34.8 09/11/2020     09/11/2020    INR 3.23 09/11/2020       Ht Readings from Last 1 Encounters:   09/11/20 5' 7\" (1.702 m)        Wt Readings from Last 1 Encounters:   09/11/20 250 lb 3.6 oz (113.5 kg)     Dosing weight: 114 kg    Assessment/Plan:  No Initial bolus  Initial infusion rate: 10 mL/hr  Next aPTT: 0100 9/12/20    Pharmacy will continue to monitor adjust heparin based on aPTT results using nomogram below:     LOW DOSE HEPARIN PROTOCOL (ACS/STEMI/A FIB)         Initial Rate: 12 units/kg/hr Max Initial Rate: 1,000 units/hr     aPTT < 36   Heparin 60 units/kg bolus Increase infusion by 4 units/kg/hr       (maximum 4,000 units)   aPTT 37-48   Heparin 30 units/kg bolus Increase infusion by 2 units/kg/hr       (maximum 2,000 units)   aPTT 49-76   No bolus   No change   aPTT 77-85   No bolus   Decrease infusion by 2 units/kg/hr   aPTT 86-94   Hold heparin for 1 hour Decrease infusion by 3 units/kg/hr   aPTT     Hold heparin for 1 hour Decrease infusion by 4 units/kg/hr   aPTT > 103   Hold heparin for 1 hour Decrease infusion by 6 units/kg/hr    Obtain aPTT 6 hours after initial bolus and 6 hours after any dose change until two consecutive therapeutic aPTTs are achieved - then daily.   Salomon Bishop, [address] 9/11/2020 6:37 PM No

## 2020-09-17 ENCOUNTER — LABORATORY RESULT (OUTPATIENT)
Age: 55
End: 2020-09-17

## 2020-09-17 ENCOUNTER — APPOINTMENT (OUTPATIENT)
Dept: RHEUMATOLOGY | Facility: CLINIC | Age: 55
End: 2020-09-17
Payer: MEDICARE

## 2020-09-17 PROCEDURE — 96366 THER/PROPH/DIAG IV INF ADDON: CPT

## 2020-09-17 PROCEDURE — 96365 THER/PROPH/DIAG IV INF INIT: CPT

## 2020-09-17 PROCEDURE — 36415 COLL VENOUS BLD VENIPUNCTURE: CPT

## 2020-10-15 ENCOUNTER — APPOINTMENT (OUTPATIENT)
Dept: RHEUMATOLOGY | Facility: CLINIC | Age: 55
End: 2020-10-15
Payer: MEDICARE

## 2020-10-15 PROCEDURE — 96366 THER/PROPH/DIAG IV INF ADDON: CPT

## 2020-10-15 PROCEDURE — 96365 THER/PROPH/DIAG IV INF INIT: CPT

## 2020-10-20 ENCOUNTER — EMERGENCY (EMERGENCY)
Facility: HOSPITAL | Age: 55
LOS: 1 days | Discharge: HOME | End: 2020-10-20
Attending: STUDENT IN AN ORGANIZED HEALTH CARE EDUCATION/TRAINING PROGRAM
Payer: MEDICARE

## 2020-10-20 VITALS
DIASTOLIC BLOOD PRESSURE: 95 MMHG | SYSTOLIC BLOOD PRESSURE: 188 MMHG | TEMPERATURE: 98 F | HEART RATE: 81 BPM | HEIGHT: 73.5 IN | RESPIRATION RATE: 20 BRPM | WEIGHT: 227.96 LBS | OXYGEN SATURATION: 98 %

## 2020-10-20 DIAGNOSIS — J34.2 DEVIATED NASAL SEPTUM: Chronic | ICD-10-CM

## 2020-10-20 DIAGNOSIS — F25.9 SCHIZOAFFECTIVE DISORDER, UNSPECIFIED: ICD-10-CM

## 2020-10-20 LAB
ALBUMIN SERPL ELPH-MCNC: 4.3 G/DL — SIGNIFICANT CHANGE UP (ref 3.3–5)
ALP SERPL-CCNC: 108 U/L — SIGNIFICANT CHANGE UP (ref 40–120)
ALT FLD-CCNC: 17 U/L — SIGNIFICANT CHANGE UP (ref 10–45)
ANION GAP SERPL CALC-SCNC: 10 MMOL/L — SIGNIFICANT CHANGE UP (ref 5–17)
ANION GAP SERPL CALC-SCNC: 8 MMOL/L — SIGNIFICANT CHANGE UP (ref 5–17)
APAP SERPL-MCNC: <15 UG/ML — SIGNIFICANT CHANGE UP (ref 10–30)
APPEARANCE UR: CLEAR — SIGNIFICANT CHANGE UP
AST SERPL-CCNC: 27 U/L — SIGNIFICANT CHANGE UP (ref 10–40)
BASOPHILS # BLD AUTO: 0.02 K/UL — SIGNIFICANT CHANGE UP (ref 0–0.2)
BASOPHILS NFR BLD AUTO: 0.5 % — SIGNIFICANT CHANGE UP (ref 0–2)
BILIRUB SERPL-MCNC: 0.3 MG/DL — SIGNIFICANT CHANGE UP (ref 0.2–1.2)
BILIRUB UR-MCNC: NEGATIVE — SIGNIFICANT CHANGE UP
BUN SERPL-MCNC: 7 MG/DL — SIGNIFICANT CHANGE UP (ref 7–23)
BUN SERPL-MCNC: 7 MG/DL — SIGNIFICANT CHANGE UP (ref 7–23)
CALCIUM SERPL-MCNC: 9.3 MG/DL — SIGNIFICANT CHANGE UP (ref 8.4–10.5)
CALCIUM SERPL-MCNC: 9.4 MG/DL — SIGNIFICANT CHANGE UP (ref 8.4–10.5)
CHLORIDE SERPL-SCNC: 88 MMOL/L — LOW (ref 96–108)
CHLORIDE SERPL-SCNC: 97 MMOL/L — SIGNIFICANT CHANGE UP (ref 96–108)
CO2 SERPL-SCNC: 27 MMOL/L — SIGNIFICANT CHANGE UP (ref 22–31)
CO2 SERPL-SCNC: 28 MMOL/L — SIGNIFICANT CHANGE UP (ref 22–31)
COLOR SPEC: COLORLESS — SIGNIFICANT CHANGE UP
CREAT ?TM UR-MCNC: 4 MG/DL — SIGNIFICANT CHANGE UP
CREAT SERPL-MCNC: 0.56 MG/DL — SIGNIFICANT CHANGE UP (ref 0.5–1.3)
CREAT SERPL-MCNC: 0.56 MG/DL — SIGNIFICANT CHANGE UP (ref 0.5–1.3)
DIFF PNL FLD: NEGATIVE — SIGNIFICANT CHANGE UP
EOSINOPHIL # BLD AUTO: 0.07 K/UL — SIGNIFICANT CHANGE UP (ref 0–0.5)
EOSINOPHIL NFR BLD AUTO: 1.6 % — SIGNIFICANT CHANGE UP (ref 0–6)
ETHANOL SERPL-MCNC: SIGNIFICANT CHANGE UP MG/DL (ref 0–10)
GLUCOSE SERPL-MCNC: 102 MG/DL — HIGH (ref 70–99)
GLUCOSE SERPL-MCNC: 87 MG/DL — SIGNIFICANT CHANGE UP (ref 70–99)
GLUCOSE UR QL: NEGATIVE — SIGNIFICANT CHANGE UP
HCT VFR BLD CALC: 37.9 % — LOW (ref 39–50)
HGB BLD-MCNC: 13.1 G/DL — SIGNIFICANT CHANGE UP (ref 13–17)
IMM GRANULOCYTES NFR BLD AUTO: 0.2 % — SIGNIFICANT CHANGE UP (ref 0–1.5)
KETONES UR-MCNC: NEGATIVE — SIGNIFICANT CHANGE UP
LEUKOCYTE ESTERASE UR-ACNC: NEGATIVE — SIGNIFICANT CHANGE UP
LYMPHOCYTES # BLD AUTO: 1.05 K/UL — SIGNIFICANT CHANGE UP (ref 1–3.3)
LYMPHOCYTES # BLD AUTO: 23.9 % — SIGNIFICANT CHANGE UP (ref 13–44)
MCHC RBC-ENTMCNC: 27.5 PG — SIGNIFICANT CHANGE UP (ref 27–34)
MCHC RBC-ENTMCNC: 34.6 GM/DL — SIGNIFICANT CHANGE UP (ref 32–36)
MCV RBC AUTO: 79.6 FL — LOW (ref 80–100)
MONOCYTES # BLD AUTO: 0.45 K/UL — SIGNIFICANT CHANGE UP (ref 0–0.9)
MONOCYTES NFR BLD AUTO: 10.3 % — SIGNIFICANT CHANGE UP (ref 2–14)
NEUTROPHILS # BLD AUTO: 2.79 K/UL — SIGNIFICANT CHANGE UP (ref 1.8–7.4)
NEUTROPHILS NFR BLD AUTO: 63.5 % — SIGNIFICANT CHANGE UP (ref 43–77)
NITRITE UR-MCNC: NEGATIVE — SIGNIFICANT CHANGE UP
NRBC # BLD: 0 /100 WBCS — SIGNIFICANT CHANGE UP (ref 0–0)
OSMOLALITY SERPL: 259 MOSMOL/KG — LOW (ref 275–300)
OSMOLALITY UR: 50 MOS/KG — LOW (ref 300–900)
PH UR: 7 — SIGNIFICANT CHANGE UP (ref 5–8)
PLATELET # BLD AUTO: 179 K/UL — SIGNIFICANT CHANGE UP (ref 150–400)
POTASSIUM SERPL-MCNC: 3.6 MMOL/L — SIGNIFICANT CHANGE UP (ref 3.5–5.3)
POTASSIUM SERPL-MCNC: 3.8 MMOL/L — SIGNIFICANT CHANGE UP (ref 3.5–5.3)
POTASSIUM SERPL-SCNC: 3.6 MMOL/L — SIGNIFICANT CHANGE UP (ref 3.5–5.3)
POTASSIUM SERPL-SCNC: 3.8 MMOL/L — SIGNIFICANT CHANGE UP (ref 3.5–5.3)
PROT SERPL-MCNC: 7.1 G/DL — SIGNIFICANT CHANGE UP (ref 6–8.3)
PROT UR-MCNC: NEGATIVE — SIGNIFICANT CHANGE UP
RBC # BLD: 4.76 M/UL — SIGNIFICANT CHANGE UP (ref 4.2–5.8)
RBC # FLD: 13.8 % — SIGNIFICANT CHANGE UP (ref 10.3–14.5)
SALICYLATES SERPL-MCNC: <2 MG/DL — LOW (ref 15–30)
SODIUM SERPL-SCNC: 125 MMOL/L — LOW (ref 135–145)
SODIUM SERPL-SCNC: 133 MMOL/L — LOW (ref 135–145)
SODIUM UR-SCNC: <35 MMOL/L — SIGNIFICANT CHANGE UP
SP GR SPEC: 1 — LOW (ref 1.01–1.02)
UROBILINOGEN FLD QL: NEGATIVE — SIGNIFICANT CHANGE UP
WBC # BLD: 4.39 K/UL — SIGNIFICANT CHANGE UP (ref 3.8–10.5)
WBC # FLD AUTO: 4.39 K/UL — SIGNIFICANT CHANGE UP (ref 3.8–10.5)

## 2020-10-20 PROCEDURE — 82570 ASSAY OF URINE CREATININE: CPT

## 2020-10-20 PROCEDURE — 93005 ELECTROCARDIOGRAM TRACING: CPT

## 2020-10-20 PROCEDURE — 85025 COMPLETE CBC W/AUTO DIFF WBC: CPT

## 2020-10-20 PROCEDURE — 80307 DRUG TEST PRSMV CHEM ANLYZR: CPT

## 2020-10-20 PROCEDURE — 99285 EMERGENCY DEPT VISIT HI MDM: CPT | Mod: CS,GC

## 2020-10-20 PROCEDURE — 80048 BASIC METABOLIC PNL TOTAL CA: CPT

## 2020-10-20 PROCEDURE — 81003 URINALYSIS AUTO W/O SCOPE: CPT

## 2020-10-20 PROCEDURE — 84300 ASSAY OF URINE SODIUM: CPT

## 2020-10-20 PROCEDURE — 99285 EMERGENCY DEPT VISIT HI MDM: CPT

## 2020-10-20 PROCEDURE — 90792 PSYCH DIAG EVAL W/MED SRVCS: CPT | Mod: 95

## 2020-10-20 PROCEDURE — 83930 ASSAY OF BLOOD OSMOLALITY: CPT

## 2020-10-20 PROCEDURE — 93010 ELECTROCARDIOGRAM REPORT: CPT

## 2020-10-20 PROCEDURE — 80053 COMPREHEN METABOLIC PANEL: CPT

## 2020-10-20 PROCEDURE — 83935 ASSAY OF URINE OSMOLALITY: CPT

## 2020-10-20 PROCEDURE — U0003: CPT

## 2020-10-20 RX ORDER — SODIUM CHLORIDE 9 MG/ML
1000 INJECTION INTRAMUSCULAR; INTRAVENOUS; SUBCUTANEOUS ONCE
Refills: 0 | Status: COMPLETED | OUTPATIENT
Start: 2020-10-20 | End: 2020-10-20

## 2020-10-20 RX ADMIN — SODIUM CHLORIDE 1000 MILLILITER(S): 9 INJECTION INTRAMUSCULAR; INTRAVENOUS; SUBCUTANEOUS at 18:42

## 2020-10-20 NOTE — ED BEHAVIORAL HEALTH ASSESSMENT NOTE - HPI (INCLUDE ILLNESS QUALITY, SEVERITY, DURATION, TIMING, CONTEXT, MODIFYING FACTORS, ASSOCIATED SIGNS AND SYMPTOMS)
55yo domiciled in [facility] housing program, single, unemployed on disability with hx of schizoaffective d/o with no reported hx of SA/SIB, hx of multiple psych hospitalizations (last known in 2016 at 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      Pt spoke of feeling depressed in the past 3 months after his mother . Pt stated that he had been sleeping "all the time", without much of an appetite, poor energy, with increased sense of guilt about all the "trouble [he] put [his] mother through" with increasingly sense of hopelessness. Patient reported that this past week, he had been experiencing suicidal ideations inc thoughts of not "wanting to live without" his mother. Patient reported that last night it escalated to him coming up with a plan to "overdose by taking the whole of benztropine when the next refill comes through". Patient reported ongoing SI w/ plan. Patient reported ongoing "general anxiety" without any panic attacks. Pt denied recent manic symptoms inc FOI, dec need for sleep. Patient denied AH/VH/paranoia.      Collateral: unable to reach Dr Cook [phone] at this time of day

## 2020-10-20 NOTE — ED PROVIDER NOTE - ATTENDING CONTRIBUTION TO CARE
Salinas DO: I have personally performed a face to face medical and diagnostic evaluation of the patient. I have discussed with and reviewed the resident's note and agree with the History, ROS, Physical Exam and MDM unless otherwise indicated. A brief summary of my personal evaluation and impression can be found below.    54M hx anxiety/depression (hospitalized for SI many years ago per pt, no prior attempts at SI), on paxel, wellbutrin, benztropine, HTN, hx MVP, CVID, p/w suicidal ideation x 3 months but worsening in the last few weeks, states offset by mother passing 3 months ago, notes he 'does not want to live any more', has thought about 'taking a handful of pills'. No drug use/ alcohol use. Chronic smoker. Notes poor sleep, decreased appetite. States he has 3 sisters whom he feels supported by.No access to firearms, no recent attempt at suicide.  No chest pain/ sob/ nausea/ dizziness/ abd pain/ diarrhea. Does not recall name of psychiatrist. Sal[place] DO: I have personally performed a face to face medical and diagnostic evaluation of the patient. I have discussed with and reviewed the resident's note and agree with the History, ROS, Physical Exam and MDM unless otherwise indicated. A brief summary of my personal evaluation and impression can be found below.    54M hx anxiety/depression (hospitalized for SI many years ago per pt, no prior attempts at SI), on paxel, wellbutrin, benztropine, HTN, hx MVP, CVID, p/w suicidal ideation x 3 months but worsening in the last few weeks, states offset by mother passing 3 months ago, notes he 'does not want to live any more', has thought about 'taking a handful of pills'. No drug use/ alcohol use. Chronic smoker. Notes poor sleep, decreased appetite. States he has 3 sisters whom he feels supported by.No access to firearms, no recent attempt at suicide.  No chest pain/ sob/ nausea/ dizziness/ abd pain/ diarrhea. Does not recall name of psychiatrist.  Gen: AAOx3, NAD  Head: NCAT  ENT: Airway patent, moist mucous membranes   Cardiac: Normal rate, normal rhythm, no murmurs   Respiratory: Lungs CTA B/L  Gastrointestinal:  Abdomen soft, nontender, nondistended   MSK: No gross abnormalities   HEME: Extremities warm   Skin: No rashes, no lesions  Neuro: No gross neurologic deficits, no dysarthria, no gait abnormality  Plan: labs, psych eval for SI with plan and moderate risk for completion or attempt of self harm Salinas DO: I have personally performed a face to face medical and diagnostic evaluation of the patient. I have discussed with and reviewed the resident's note and agree with the History, ROS, Physical Exam and MDM unless otherwise indicated. A brief summary of my personal evaluation and impression can be found below.    54M hx anxiety/depression (hospitalized for SI many years ago per pt, no prior attempts at SI), on paxel, wellbutrin, benztropine, HTN, hx MVP, CVID, p/w suicidal ideation x 3 months but worsening in the last few weeks, states offset by mother passing 3 months ago, notes he 'does not want to live any more', has thought about 'taking a handful of pills'. No drug use/ alcohol use. Chronic smoker. Notes poor sleep, decreased appetite. States he has 3 sisters whom he feels supported by.No access to firearms, no recent attempt at suicide.  No chest pain/ sob/ nausea/ dizziness/ abd pain/ diarrhea. Does not recall name of psychiatrist.  Gen: AAOx3, NAD  Head: NCAT  ENT: Airway patent, moist mucous membranes   Cardiac: Normal rate, normal rhythm, no murmurs   Respiratory: Lungs CTA B/L  Gastrointestinal:  Abdomen soft, nontender, nondistended   MSK: No gross abnormalities   HEME: Extremities warm   Skin: No rashes, no lesions  Neuro: No gross neurologic deficits, no dysarthria, no gait abnormality  Plan: labs, psych eval for SI with plan and moderate risk for completion or attempt of self harm. No infectious symptoms, no nausea/vomiting/ headache. Low suspicion for metabolic derangement as precipitant for SI.

## 2020-10-20 NOTE — ED BEHAVIORAL HEALTH ASSESSMENT NOTE - ABUSE / TRAUMA HISTORY
What Is The Reason For Today's Visit?: Full Body Skin Examination What Is The Reason For Today's Visit? (Being Monitored For X): concerning skin lesions on an annual basis How Severe Are Your Spot(S)?: mild No

## 2020-10-20 NOTE — ED ADULT NURSE NOTE - SUICIDE RISK FACTORS
Access to lethal methods (pills, firearm, etc.: Ask specifically about presence or absence of a firearm in the home or ease of accessing/Agitation/Severe Anxiety/Panic

## 2020-10-20 NOTE — ED BEHAVIORAL HEALTH ASSESSMENT NOTE - RISK ASSESSMENT
risk factors of self harm stemming from recent loss, age, gender, limited social support with protective factor of lack of past SA/SIB, fear of pain/death, seeking help and pos therapeutic alliance Moderate Acute Suicide Risk

## 2020-10-20 NOTE — ED BEHAVIORAL HEALTH ASSESSMENT NOTE - SUMMARY
53yo M w/ hx of schizoaffective d/o, no past Sa/SIB, past psych hospitalizations with med hx of DM, common variable immunodeficiency (possible hx of DM, hypothyroidism as per prior notes) who presented with increased depression with symptoms inc suicidal ideation w/ plan to overdose in context of stressor of losing his mother. Patient continues to endorse ongoing SI w/ plan. Patient is without s/sx of yo or psychosis. Patient might benefit from inpatient psych hospitalization for management of his depression and for his safety. Patient is amenable to this.

## 2020-10-20 NOTE — ED BEHAVIORAL HEALTH ASSESSMENT NOTE - OTHER PAST PSYCHIATRIC HISTORY (INCLUDE DETAILS REGARDING ONSET, COURSE OF ILLNESS, INPATIENT/OUTPATIENT TREATMENT)
reported hx of schizoaffective d/o, with no past SA/SIB, hx of multiple psych hospitalizations (last known in [place] [address]), with hx of outpatient psych services at [facility] w/ dr mccauley

## 2020-10-20 NOTE — ED BEHAVIORAL HEALTH ASSESSMENT NOTE - DESCRIPTION
no beh issues    COVID Exposure Screen- Patient    1.	*In the past 14 days, have you been around anyone with a positive COVID-19 test?*   (  ) Yes   ( x ) No   (  ) Unknown- Reason (e.g. patient uncertain, sedated, refusing to answer, etc.):  ______  IF YES PROCEED TO QUESTION #2. IF NO or UNKNOWN, PLEASE SKIP TO QUESTION #7  2.	Were you within 6 feet of them for at least 15 minutes? (  ) Yes   (  ) No   (  ) Unknown- Reason: ______    3.	Have you provided care for them? (  ) Yes   (  ) No   (  ) Unknown- Reason: ______    4.	Have you had direct physical contact with them (touched, hugged, or kissed them)? (  ) Yes   (  ) No    (  ) Unknown- Reason: ______    5.	Have you shared eating or drinking utensils with them? (  ) Yes   (  ) No    (  ) Unknown- Reason: ______    6.	Have they sneezed, coughed, or somehow got respiratory droplets on you? (  ) Yes   (  ) No    (  ) Unknown- Reason: ______    7.	*Have you been out of [place] within the past 14 days?*  (  ) Yes   ( x ) No   (  ) Unknown- Reason (e.g. patient uncertain, sedated, refusing to answer, etc.): _______  IF YES PLEASE ANSWER THE FOLLOWING QUESTIONS:  8.	Which state/country have you been to? ______   9.	Were you there over 24 hours? (  ) Yes   (  ) No    (  ) Unknown- Reason: ______    10.	What date did you return to [facility]? ______ per pt: HTN, common variable immunodeficiency (reported to get monthly IVIG infusion - next due in 3.5 week) single, unemployed on disability, reportedly lives in [facility] housing program w/ 2 roommates - reportedly has a CM named Caroline; highest level of edu: 12th grade

## 2020-10-20 NOTE — ED ADULT NURSE NOTE - OBJECTIVE STATEMENT
54 year old man who came fo complaints of anxiety with SI. The patient has a history of anxiety and is on benztropine and paxil. Pt also has a PMH of HTN and gerd. As per the patient he has been consistent and compliant  with taking his mediation. The patient states he called his sister today and told her that he has lot of anxiety and thought of harming himself. The sister called a cab and told him to come to [facility]. The patient states his mother  three months ago. The patient states he has thought of harming himself. When asked if he has a plan the patient states he would take the whole bottle of benztropine. Patient denies any previous suicide attempts Pt also denies any HI.

## 2020-10-20 NOTE — ED BEHAVIORAL HEALTH ASSESSMENT NOTE - MEDICAL ISSUES AND PLAN (INCLUDE STANDING AND PRN MEDICATION)
-repeat BMP after IV fluids for noted hyponatremia, monitor and tx his BP (reportedly on norvasc at home, past records indicate also on lisinopril); will need IVIG treatment in 3 weeks (reported hx of common variable immunodeficiency)

## 2020-10-20 NOTE — ED BEHAVIORAL HEALTH NOTE - BEHAVIORAL HEALTH NOTE
===================  PRE-HOSPITAL COURSE  ===================    SOURCE: Chart    DETAILS: Arrived via walk-in unaccompanied for anxiety and SI     ============  ED COURSE   ============    SOURCE: ED, Chart    ARRIVAL: Walk-in    BELONGINGS: No items of note    BEHAVIOR: Patient arrived to the ED alert and oriented x3, patient was cooperative with ED medical and safety protocols. Patient reported anxious mood, his affect appeared depressed with poor eye contact. Patient reported SI with thoughts to overdose on pills. He denied HI, he did not report or exhibit any overt psychotic/manic symptoms, her thought process and speech were WNL. Patient remained in good behavioral control while in the ED.     TREATMENT: No PRN psychiatric medication prior to assessment     VISITORS: None      ========================  COLLATERAL  ========================    NAME: Brittany    NUMBER: [phone]    RELATIONSHIP: Sister    RELIABILITY: Good    COMMENTS: Close with patient, frequent contact    ========================  HPI  ========================    BASELINE FUNCTIONING: Patient is low functioning at baseline. Patient resides in [place] [facility] [place] for the past few years with two roommates, he is on SSD and receives food stamps. Patient is independent with grooming/hygiene/ambulation but requires assistance with most chores/obligations. She states he can be gullible, innocent, and timid at baseline. Also has intermittent anxiety/depression episodes with some minor paranoia thinking people are looking at him/watching him. Patient receives treatment through [facility] with Dr. Read, states patient is on an injectable (unsure of specifics) and is generally compliant.     DATE HPI STARTED: A few weeks ago    DECOMPENSATION: Sister states patient has been upset and anxious the past few weeks due to ongoing issues at his apartment. States his two roommates are very disruptive, one of them uses drugs/etoh frequently and invites people to the home. Sister visited at one point recently and a homeless man was sleeping in patient’s bed, she states he is too timid to ask people to leave. She reports another roommate left for [place] and came back requiring quarantine but was not positive for COVID. She states apartment is unkempt. She states the [employer] program recently let his Medicaid and food stamps lapse so last week he had no food and required emergency assistance from the town to deliver food. Sister confirms their mother  in July and that patient was very close with her. Today patient called her stating that he felt chest pressure and shortness of breath, he reportedly called his other sister and also mentioned worsening anxiety and suicidal thoughts so she called a cab for patient to come to the ED. No recent acute psychotic/manic symptoms or other acute issues reported by sister. Sister is supportive of inpatient psych admission, states she feels he should go to [facility] since it is where his provider works.     SUICIDALITY: SI to sister, no recent SI prior to this, no recent self-harm or attempts known    VIOLENCE: None    SUBSTANCE: Heavy cigarette smoker    ========================  PAST PSYCHIATRIC HISTORY  ========================    DATE PAST PSYCHIATRIC HISTORY STARTED: Chronic 20+ years    MAIN PSYCHIATRIC DIAGNOSIS: Schizoaffective d/o, social phobia    PSYCHIATRIC HOSPITALIZATIONS: Multiple, most recent about 3-4 years ago at [facility] per sister    SUICIDALITY:  Past SI but no self-harm or attempts    VIOLENCE: No violence hx    SUBSTANCE USE: Heavy cigarette smoker, no other substance abuse issues    ==============  OTHER HISTORY  ==============    CURRENT MEDICATION: Injection, possible other medications (sister not certain)    MEDICAL HISTORY: States he has an immune d/o requiring monthly infusions     LEGAL ISSUES: No legal    FIREARM ACCESS: None known    SOCIAL HISTORY: No known trauma/abuse    FAMILY HISTORY: None known    1.	*In the past 14 days, has the patient been around anyone with a positive COVID-19 test?*   (  ) Yes   ( x ) No   (  ) Unknown- Reason (e.g. collateral uncertain, refusing to answer, etc.):  ______  IF YES PROCEED TO QUESTION #2. IF NO or UNKNOWN, PLEASE SKIP TO QUESTION #7  2.	Was the patient within 6 feet of them for at least 15 minutes? (  ) Yes   (  ) No   (  ) Unknown- Reason: ______    3.	Did the patient provide care for them? (  ) Yes   (  ) No   (  ) Unknown- Reason: ______    4.	Did the patient have direct physical contact with them (touched, hugged, or kissed them)? (  ) Yes   (  ) No    (  ) Unknown- Reason: ______    5.	Did the patient share eating or drinking utensils with them? (  ) Yes   (  ) No    (  ) Unknown- Reason: ______    6.	Have they sneezed, coughed, or somehow got respiratory droplets on the patient? (  ) Yes   (  ) No    (  ) Unknown- Reason: ______    7.	*Has the patient been out of [place] within the past 14 days?*  (  ) Yes   ( x ) No   (  ) Unknown- Reason (e.g. collateral uncertain, refusing to answer, etc.): _______  IF YES PLEASE ANSWER THE FOLLOWING QUESTIONS:  8.	Which state/country has the patient been to? ______   9.	Was the patient there over 24 hours? (  ) Yes   (  ) No    (  ) Unknown- Reason: ______    10.	What date did the patient return to [facility]? ______

## 2020-10-20 NOTE — ED BEHAVIORAL HEALTH ASSESSMENT NOTE - PSYCHIATRIC ISSUES AND PLAN (INCLUDE STANDING AND PRN MEDICATION)
give paxil 30mg po HS, cogentin 1mg po HS; hold wellbutrin until dose verified with [facility] in [place], [place] or with outpt psychiatrist; need haldol dec on 10/22/20 but need dose verified with outpt psychiatrist

## 2020-10-20 NOTE — ED PROVIDER NOTE - PROGRESS NOTE DETAILS
tele psych called, asking to repeat bmp after tx , will reassess, pt without active symptoms of hyponatremia Niki Harkins MD: Call from  Caroline Encarnacion requesting status. Will call her back at [phone] Sal[place] DO: reassessed, stable, inquired about polydipsia as that is likely cause of pts hyponatremia- pt states he does drink a lot of water. Pt, aware of plan for admission and awaiting psych bed, per tele psych, will await covid results prior to looking for bed. Sign out follow-up: No acute overnight events. Patient is medically clear and pending bed assignment for voluntary psychiatric admission. Signed out to Murphy Medrano. Sign out follow-up: No acute overnight events. Patient is medically clear and pending bed assignment for voluntary psychiatric admission. Signed out to Dr. Ortega Medrano. (MINA endorsed to me. pt stable and calm. bed at [address] [place] [address] dr suárez accepted to radha

## 2020-10-20 NOTE — ED PROVIDER NOTE - CARE PLAN
Principal Discharge DX:	Schizoaffective disorder, unspecified type   Principal Discharge DX:	Suicidal ideation

## 2020-10-20 NOTE — ED BEHAVIORAL HEALTH ASSESSMENT NOTE - CURRENT MEDICATION
Haldol dec [id number] : reported due on Thursday 10/22/20 (pt was unsure of dose)  Paxil 30mg po HS  Cogentin 1mg po HS  Wellbutrin XL (pt unsure of dose) po HS

## 2020-10-21 ENCOUNTER — INPATIENT (INPATIENT)
Facility: HOSPITAL | Age: 55
LOS: 19 days | Discharge: HOME | End: 2020-11-10
Attending: PSYCHIATRY & NEUROLOGY | Admitting: PSYCHIATRY & NEUROLOGY
Payer: MEDICARE

## 2020-10-21 VITALS — WEIGHT: 216.93 LBS | TEMPERATURE: 98 F | HEIGHT: 74 IN

## 2020-10-21 VITALS
TEMPERATURE: 98 F | SYSTOLIC BLOOD PRESSURE: 156 MMHG | OXYGEN SATURATION: 99 % | HEART RATE: 67 BPM | DIASTOLIC BLOOD PRESSURE: 80 MMHG | RESPIRATION RATE: 17 BRPM

## 2020-10-21 DIAGNOSIS — J34.2 DEVIATED NASAL SEPTUM: Chronic | ICD-10-CM

## 2020-10-21 DIAGNOSIS — F25.9 SCHIZOAFFECTIVE DISORDER, UNSPECIFIED: ICD-10-CM

## 2020-10-21 LAB — SARS-COV-2 RNA SPEC QL NAA+PROBE: SIGNIFICANT CHANGE UP

## 2020-10-21 PROCEDURE — 99221 1ST HOSP IP/OBS SF/LOW 40: CPT | Mod: GC

## 2020-10-21 RX ORDER — AMLODIPINE BESYLATE 2.5 MG/1
2.5 TABLET ORAL DAILY
Refills: 0 | Status: DISCONTINUED | OUTPATIENT
Start: 2020-10-21 | End: 2020-10-22

## 2020-10-21 RX ORDER — DIPHENHYDRAMINE HCL 50 MG
50 CAPSULE ORAL ONCE
Refills: 0 | Status: DISCONTINUED | OUTPATIENT
Start: 2020-10-21 | End: 2020-11-10

## 2020-10-21 RX ORDER — HALOPERIDOL DECANOATE 100 MG/ML
5 INJECTION INTRAMUSCULAR ONCE
Refills: 0 | Status: DISCONTINUED | OUTPATIENT
Start: 2020-10-21 | End: 2020-11-10

## 2020-10-21 RX ORDER — BUPROPION HYDROCHLORIDE 150 MG/1
150 TABLET, EXTENDED RELEASE ORAL AT BEDTIME
Refills: 0 | Status: DISCONTINUED | OUTPATIENT
Start: 2020-10-21 | End: 2020-10-23

## 2020-10-21 RX ORDER — DIPHENHYDRAMINE HCL 50 MG
50 CAPSULE ORAL EVERY 6 HOURS
Refills: 0 | Status: DISCONTINUED | OUTPATIENT
Start: 2020-10-21 | End: 2020-11-10

## 2020-10-21 RX ORDER — HALOPERIDOL DECANOATE 100 MG/ML
5 INJECTION INTRAMUSCULAR EVERY 6 HOURS
Refills: 0 | Status: DISCONTINUED | OUTPATIENT
Start: 2020-10-21 | End: 2020-11-10

## 2020-10-21 RX ORDER — HALOPERIDOL DECANOATE 100 MG/ML
150 INJECTION INTRAMUSCULAR ONCE
Refills: 0 | Status: COMPLETED | OUTPATIENT
Start: 2020-10-22 | End: 2020-10-22

## 2020-10-21 RX ORDER — FAMOTIDINE 10 MG/ML
20 INJECTION INTRAVENOUS DAILY
Refills: 0 | Status: DISCONTINUED | OUTPATIENT
Start: 2020-10-21 | End: 2020-11-10

## 2020-10-21 RX ORDER — BENZTROPINE MESYLATE 1 MG
1 TABLET ORAL AT BEDTIME
Refills: 0 | Status: DISCONTINUED | OUTPATIENT
Start: 2020-10-21 | End: 2020-11-10

## 2020-10-21 RX ADMIN — Medication 1 MILLIGRAM(S): at 21:26

## 2020-10-21 RX ADMIN — BUPROPION HYDROCHLORIDE 150 MILLIGRAM(S): 150 TABLET, EXTENDED RELEASE ORAL at 21:26

## 2020-10-21 RX ADMIN — Medication 45 MILLIGRAM(S): at 21:26

## 2020-10-21 NOTE — ED BEHAVIORAL HEALTH NOTE - BEHAVIORAL HEALTH NOTE
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  Patient seen and reevaluated by day team, patient is awake and alert, reports has been having a hard time dealing with the loss of his mother 3 months ago.  States she was sick with leukemia, reports being very close to her.  Also discusses stressors in his current residence- states that the staff “messed up my food stamps” and did not have access to food stamps last week and was unable to purchase food; states has a roommate who uses illicit substances in the home.  He reports last night having suicidal thoughts with plan to overdose on the entire bottle of his next prescription of benztropine as well as his amlodipine pills.  Reports he had disclosed these feelings to his sister who then called for a cab to bring him to the hospital.  Reports being complaint with his psychiatric medications, reports is due for his Haldol deaconate this week.  Currently endorses having +AH of people “talking about me” however are chronic in nature.  Denies having CAH to harm self or others.  Denies VH, denies thoughts of paranoia.  Reports sleep and appetite are unchanged.  Currently endorses SI, with plan to overdose on pills, reports feels unsafe to return home as he may hurt himself, patient requesting for voluntary admission to inpt psychiatry.      Attempted to reach patient’s outpt psychiatrist at [facility], Dr. Read ([phone]) however provider unavailable, message left with  for provider to call back.    MSE: Grooming fair, behavior cooperative, AOx3, fair eye contact and relatedness, impulse control is intact, normal gait, speech with normal volume/rate/rhythm, speech articulation is normal, mood is depressed, affect constricted, TP linear, TC: +SI with plan to overdose on pills, No HI, +AH of voices talking to him which are chronic in nature, denies CAH, denies VH, paranoia, memory intact     RISK ASSESSMENT     Risk factors of self-harm stemming from recent loss, age, gender, limited social support with protective factor of lack of past SA/SIB, fear of pain/death, seeking help and pos therapeutic alliance, current SI with plan to overdose on his medications    Axis I Schizoaffective d/o  Axis III none  Axis IV Problems with primary support, Problem related to social environment.  Axis V 25    PLAN: Once medically stable and pending bed availability, patient will be admitted to inpt psychiatry on a 9.13 status for risk of harm to self.      Psychiatric Issues and plan: While in ED maintain patient on 1:1 constant observation does not need 1:1 while inpt; give Paxil 30mg po HS, Cogentin 1mg po HS; hold Wellbutrin until dose verified with [facility] in [place], [place] or with outpt psychiatrist; need Haldol dec on 10/22/20 but need dose verified with outpt psychiatrist  Medical issues and plan: defer to ED provider for medical plan. will need IVIG treatment in 3 weeks (reported hx of common variable immunodeficiency)  Substance abuse issues and plan: n/a

## 2020-10-21 NOTE — ED BEHAVIORAL HEALTH NOTE - BEHAVIORAL HEALTH NOTE
Telepsychiatry Reassessment Note:    MD received handoff on patient from Dr. Allan.     MD spoke to GARCIA Reza for updated ED course: patient did not require PRN medications, has slept calmly for most of the night and is now awake.     MD assessed patient via remocean, patient states he continues to feel depressed and suicidal. He wishes to be admitted for stabilization, and requests [facility] as his outpatient treatment is through their facility.     A/P:   53yo M w/ hx of schizoaffective d/o, no past Sa/SIB, past psych hospitalizations with med hx of DM, common variable immunodeficiency, who presented with increased depression with symptoms including suicidal ideation w/ plan to overdose in context of stressor of losing his mother. Patient continues to report ongoing SI w/ plan. Patient is without s/sx of yo or psychosis. Patient expected to benefit from voluntary hospitalization and amenable to this plan, requesting [facility] specifically.     - COVID negative.   - Pending bed assignment

## 2020-10-21 NOTE — CHART NOTE - NSCHARTNOTEFT_GEN_A_CORE
Screening Medical Evaluation  Patient Admitted from: [facility] ER    [facility] admitting diagnosis: Schizoaffective disorder    PAST MEDICAL & SURGICAL HISTORY:  DM (diabetes mellitus)    Smoker    Bronchiectasis    Dental caries    Tracheal/bronchial disease    IgG deficiency    Bipolar 1 disorder    Deviated septum          Allergies    amoxicillin (Fever)  pcn (Unknown)    Intolerances        Social History:     FAMILY HISTORY:  Family history of throat cancer (Father)        MEDICATIONS  (STANDING):  amLODIPine   Tablet 2.5 milliGRAM(s) Oral daily  benztropine 1 milliGRAM(s) Oral at bedtime  buPROPion  milliGRAM(s) Oral at bedtime  famotidine    Tablet 20 milliGRAM(s) Oral daily  PARoxetine 45 milliGRAM(s) Oral at bedtime    MEDICATIONS  (PRN):  diphenhydrAMINE 50 milliGRAM(s) Oral every 6 hours PRN agitation  diphenhydrAMINE   Injectable 50 milliGRAM(s) IntraMuscular once PRN severe agitation  haloperidol     Tablet 5 milliGRAM(s) Oral every 6 hours PRN agitation  haloperidol    Injectable 5 milliGRAM(s) IntraMuscular once PRN severe agitation  LORazepam     Tablet 2 milliGRAM(s) Oral every 6 hours PRN agitation  LORazepam   Injectable 2 milliGRAM(s) IntraMuscular once PRN severe agitation      Vital Signs Last 24 Hrs  T(C): 36.7 (21 Oct 2020 19:41), Max: 36.8 (21 Oct 2020 14:04)  T(F): 98 (21 Oct 2020 19:41), Max: 98.2 (21 Oct 2020 14:04)  HR: 67 (21 Oct 2020 12:30) (63 - 68)  BP: 156/80 (21 Oct 2020 12:30) (143/80 - 158/90)  BP(mean): --  RR: 17 (21 Oct 2020 12:30) (17 - 20)  SpO2: 99% (21 Oct 2020 12:30) (96% - 99%)  CAPILLARY BLOOD GLUCOSE            PHYSICAL EXAM:  GENERAL: NAD, well-developed  HEAD:  Atraumatic, Normocephalic  EYES: EOMI, PERRLA, conjunctiva and sclera clear  NECK: Supple, No JVD  CHEST/LUNG: Clear to auscultation bilaterally; No wheeze  HEART: Regular rate and rhythm; No murmurs, rubs, or gallops  ABDOMEN: Soft, Nontender, Nondistended; Bowel sounds present  EXTREMITIES:  2+ Peripheral Pulses, No clubbing, cyanosis, or edema  PSYCH: AAOx3  NEUROLOGY: non-focal  SKIN: No rashes or lesions    LABS:                        13.1   4.39  )-----------( 179      ( 20 Oct 2020 15:56 )             37.9     10-20    133<L>  |  97  |  7   ----------------------------<  87  3.8   |  28  |  0.56    Ca    9.4      20 Oct 2020 20:58    TPro  7.1  /  Alb  4.3  /  TBili  0.3  /  DBili  x   /  AST  27  /  ALT  17  /  AlkPhos  108  10-          Urinalysis Basic - ( 20 Oct 2020 17:40 )    Color: Colorless / Appearance: Clear / S.003 / pH: x  Gluc: x / Ketone: Negative  / Bili: Negative / Urobili: Negative   Blood: x / Protein: Negative / Nitrite: Negative   Leuk Esterase: Negative / RBC: x / WBC x   Sq Epi: x / Non Sq Epi: x / Bacteria: x        RADIOLOGY & ADDITIONAL TESTS:    Assessment and Plan: 55 yo with PMH of HTN is admitted to [facility] with a primary psychiatric diagnosis of Schizoaffective disorder. The pt currently denies having any medical complaints such as chest pain, sob, abdominal pain, n/v/d/c, or any problems with urination or bowel movements. The rest of his screening physical is unremarkable.    1.Schizoaffective disorder-Plan: continue with meds as per primary psychiatric team  2. HTN-Plan: continue with amlodipine  3. hx of elevated glucose-Plan: f/u with A1c results

## 2020-10-21 NOTE — PHARMACOTHERAPY INTERVENTION NOTE - COMMENTS
Spoke with Dr. Franks regarding that the combination of these 2 medications can lower the seizure threshold. Dr. Franks said patient has been taking these medications together for a while.

## 2020-10-21 NOTE — CHART NOTE - NSCHARTNOTEFT_GEN_A_CORE
EMERGENCY : LMSW received overnight handoff for patient pending telepsych evaluation. LMSW contacted by Psych CL NP Sury that patient is to be a voluntary psychiatry admission for schizoaffective disorder and is in need of an inpatient bed placement. As per medical team, patient is medically cleared for transfer. LMSW contacted central intake at [facility] and spoke with Soo who states bed availability. LMSW faxed over legal forms and original EKG as per central intakes request. LMSW then received a call back from Soo stating that patient has been accepted to unit [address] [place] [address] with Dr. Melo Franks as the accepting physician. LMSW communicated this information to psych and medical teams as well as patient and patient's sister Brittany (PH: [phone]) (as per patient's request). Patients behavorial health RN then provided RN to RN handoff to [address] [place] [address] and arranged transportation via [facility]. LMSW then created transfer packet containing original legals, original EKG, non-emergent form (copy uploaded to [facility]), behavorial health assessment, face sheet, and transportation forms. LMSW provided packet to RN. Patient with straight Medicare and Medicaid insurance benefits, no authorization required. Telepsych email sent. Social work remains available for any further needs.

## 2020-10-22 ENCOUNTER — APPOINTMENT (OUTPATIENT)
Dept: ALLERGY | Facility: CLINIC | Age: 55
End: 2020-10-22

## 2020-10-22 LAB
HBA1C BLD-MCNC: 5.8 % — HIGH (ref 4–5.6)
TSH SERPL-MCNC: 2.62 UIU/ML — SIGNIFICANT CHANGE UP (ref 0.27–4.2)

## 2020-10-22 PROCEDURE — 99231 SBSQ HOSP IP/OBS SF/LOW 25: CPT | Mod: GC

## 2020-10-22 RX ORDER — AMLODIPINE BESYLATE 2.5 MG/1
5 TABLET ORAL DAILY
Refills: 0 | Status: DISCONTINUED | OUTPATIENT
Start: 2020-10-23 | End: 2020-10-26

## 2020-10-22 RX ADMIN — AMLODIPINE BESYLATE 2.5 MILLIGRAM(S): 2.5 TABLET ORAL at 09:23

## 2020-10-22 RX ADMIN — FAMOTIDINE 20 MILLIGRAM(S): 10 INJECTION INTRAVENOUS at 09:23

## 2020-10-22 RX ADMIN — HALOPERIDOL DECANOATE 150 MILLIGRAM(S): 100 INJECTION INTRAMUSCULAR at 14:03

## 2020-10-22 RX ADMIN — BUPROPION HYDROCHLORIDE 150 MILLIGRAM(S): 150 TABLET, EXTENDED RELEASE ORAL at 20:52

## 2020-10-22 RX ADMIN — Medication 1 MILLIGRAM(S): at 20:52

## 2020-10-22 RX ADMIN — Medication 45 MILLIGRAM(S): at 20:52

## 2020-10-23 PROCEDURE — 99231 SBSQ HOSP IP/OBS SF/LOW 25: CPT | Mod: GC

## 2020-10-23 RX ORDER — LANOLIN ALCOHOL/MO/W.PET/CERES
3 CREAM (GRAM) TOPICAL AT BEDTIME
Refills: 0 | Status: DISCONTINUED | OUTPATIENT
Start: 2020-10-23 | End: 2020-11-10

## 2020-10-23 RX ORDER — MIRTAZAPINE 45 MG/1
7.5 TABLET, ORALLY DISINTEGRATING ORAL AT BEDTIME
Refills: 0 | Status: DISCONTINUED | OUTPATIENT
Start: 2020-10-23 | End: 2020-10-26

## 2020-10-23 RX ADMIN — Medication 3 MILLIGRAM(S): at 21:23

## 2020-10-23 RX ADMIN — Medication 45 MILLIGRAM(S): at 21:25

## 2020-10-23 RX ADMIN — FAMOTIDINE 20 MILLIGRAM(S): 10 INJECTION INTRAVENOUS at 09:07

## 2020-10-23 RX ADMIN — AMLODIPINE BESYLATE 5 MILLIGRAM(S): 2.5 TABLET ORAL at 09:07

## 2020-10-23 RX ADMIN — MIRTAZAPINE 7.5 MILLIGRAM(S): 45 TABLET, ORALLY DISINTEGRATING ORAL at 21:25

## 2020-10-23 RX ADMIN — Medication 1 MILLIGRAM(S): at 21:23

## 2020-10-24 PROCEDURE — 99231 SBSQ HOSP IP/OBS SF/LOW 25: CPT

## 2020-10-24 RX ADMIN — Medication 45 MILLIGRAM(S): at 21:33

## 2020-10-24 RX ADMIN — FAMOTIDINE 20 MILLIGRAM(S): 10 INJECTION INTRAVENOUS at 08:15

## 2020-10-24 RX ADMIN — Medication 3 MILLIGRAM(S): at 21:33

## 2020-10-24 RX ADMIN — AMLODIPINE BESYLATE 5 MILLIGRAM(S): 2.5 TABLET ORAL at 08:15

## 2020-10-24 RX ADMIN — Medication 1 MILLIGRAM(S): at 21:33

## 2020-10-24 RX ADMIN — MIRTAZAPINE 7.5 MILLIGRAM(S): 45 TABLET, ORALLY DISINTEGRATING ORAL at 21:33

## 2020-10-25 PROCEDURE — 99231 SBSQ HOSP IP/OBS SF/LOW 25: CPT

## 2020-10-25 RX ADMIN — FAMOTIDINE 20 MILLIGRAM(S): 10 INJECTION INTRAVENOUS at 10:08

## 2020-10-25 RX ADMIN — Medication 45 MILLIGRAM(S): at 20:56

## 2020-10-25 RX ADMIN — Medication 1 MILLIGRAM(S): at 20:56

## 2020-10-25 RX ADMIN — AMLODIPINE BESYLATE 5 MILLIGRAM(S): 2.5 TABLET ORAL at 10:08

## 2020-10-25 RX ADMIN — MIRTAZAPINE 7.5 MILLIGRAM(S): 45 TABLET, ORALLY DISINTEGRATING ORAL at 20:56

## 2020-10-25 RX ADMIN — Medication 3 MILLIGRAM(S): at 20:56

## 2020-10-26 PROCEDURE — 99231 SBSQ HOSP IP/OBS SF/LOW 25: CPT | Mod: GC

## 2020-10-26 RX ORDER — MIRTAZAPINE 45 MG/1
15 TABLET, ORALLY DISINTEGRATING ORAL AT BEDTIME
Refills: 0 | Status: DISCONTINUED | OUTPATIENT
Start: 2020-10-26 | End: 2020-11-10

## 2020-10-26 RX ORDER — AMLODIPINE BESYLATE 2.5 MG/1
7.5 TABLET ORAL DAILY
Refills: 0 | Status: DISCONTINUED | OUTPATIENT
Start: 2020-10-26 | End: 2020-10-30

## 2020-10-26 RX ADMIN — Medication 3 MILLIGRAM(S): at 20:26

## 2020-10-26 RX ADMIN — AMLODIPINE BESYLATE 7.5 MILLIGRAM(S): 2.5 TABLET ORAL at 08:27

## 2020-10-26 RX ADMIN — Medication 45 MILLIGRAM(S): at 20:26

## 2020-10-26 RX ADMIN — MIRTAZAPINE 15 MILLIGRAM(S): 45 TABLET, ORALLY DISINTEGRATING ORAL at 20:26

## 2020-10-26 RX ADMIN — Medication 1 MILLIGRAM(S): at 20:26

## 2020-10-26 RX ADMIN — FAMOTIDINE 20 MILLIGRAM(S): 10 INJECTION INTRAVENOUS at 08:48

## 2020-10-27 PROCEDURE — 99231 SBSQ HOSP IP/OBS SF/LOW 25: CPT | Mod: GC

## 2020-10-27 RX ADMIN — AMLODIPINE BESYLATE 7.5 MILLIGRAM(S): 2.5 TABLET ORAL at 08:33

## 2020-10-27 RX ADMIN — MIRTAZAPINE 15 MILLIGRAM(S): 45 TABLET, ORALLY DISINTEGRATING ORAL at 20:21

## 2020-10-27 RX ADMIN — Medication 45 MILLIGRAM(S): at 20:21

## 2020-10-27 RX ADMIN — FAMOTIDINE 20 MILLIGRAM(S): 10 INJECTION INTRAVENOUS at 08:33

## 2020-10-27 RX ADMIN — Medication 1 MILLIGRAM(S): at 20:21

## 2020-10-27 RX ADMIN — Medication 3 MILLIGRAM(S): at 20:21

## 2020-10-28 PROCEDURE — 99231 SBSQ HOSP IP/OBS SF/LOW 25: CPT | Mod: GC

## 2020-10-28 RX ADMIN — MIRTAZAPINE 15 MILLIGRAM(S): 45 TABLET, ORALLY DISINTEGRATING ORAL at 21:11

## 2020-10-28 RX ADMIN — Medication 45 MILLIGRAM(S): at 21:11

## 2020-10-28 RX ADMIN — AMLODIPINE BESYLATE 7.5 MILLIGRAM(S): 2.5 TABLET ORAL at 09:05

## 2020-10-28 RX ADMIN — FAMOTIDINE 20 MILLIGRAM(S): 10 INJECTION INTRAVENOUS at 09:05

## 2020-10-28 RX ADMIN — Medication 3 MILLIGRAM(S): at 21:11

## 2020-10-28 RX ADMIN — Medication 1 MILLIGRAM(S): at 21:11

## 2020-10-29 PROCEDURE — 99231 SBSQ HOSP IP/OBS SF/LOW 25: CPT | Mod: GC

## 2020-10-29 RX ADMIN — AMLODIPINE BESYLATE 7.5 MILLIGRAM(S): 2.5 TABLET ORAL at 08:06

## 2020-10-29 RX ADMIN — Medication 45 MILLIGRAM(S): at 20:49

## 2020-10-29 RX ADMIN — FAMOTIDINE 20 MILLIGRAM(S): 10 INJECTION INTRAVENOUS at 08:06

## 2020-10-29 RX ADMIN — Medication 1 MILLIGRAM(S): at 20:49

## 2020-10-29 RX ADMIN — MIRTAZAPINE 15 MILLIGRAM(S): 45 TABLET, ORALLY DISINTEGRATING ORAL at 20:49

## 2020-10-29 RX ADMIN — Medication 3 MILLIGRAM(S): at 20:49

## 2020-10-30 PROCEDURE — 99231 SBSQ HOSP IP/OBS SF/LOW 25: CPT | Mod: GC

## 2020-10-30 RX ORDER — MIRTAZAPINE 45 MG/1
1 TABLET, ORALLY DISINTEGRATING ORAL
Qty: 14 | Refills: 0
Start: 2020-10-30 | End: 2020-11-12

## 2020-10-30 RX ORDER — AMLODIPINE BESYLATE 2.5 MG/1
3 TABLET ORAL
Qty: 42 | Refills: 0
Start: 2020-10-30 | End: 2020-11-12

## 2020-10-30 RX ORDER — POLYETHYLENE GLYCOL 3350 17 G/17G
17 POWDER, FOR SOLUTION ORAL DAILY
Refills: 0 | Status: DISCONTINUED | OUTPATIENT
Start: 2020-10-30 | End: 2020-11-10

## 2020-10-30 RX ORDER — FAMOTIDINE 10 MG/ML
1 INJECTION INTRAVENOUS
Qty: 14 | Refills: 0
Start: 2020-10-30 | End: 2020-11-12

## 2020-10-30 RX ORDER — MIRTAZAPINE 45 MG/1
0.5 TABLET, ORALLY DISINTEGRATING ORAL
Qty: 0 | Refills: 0 | DISCHARGE
Start: 2020-10-30 | End: 2020-11-12

## 2020-10-30 RX ORDER — AMLODIPINE BESYLATE 2.5 MG/1
10 TABLET ORAL DAILY
Refills: 0 | Status: DISCONTINUED | OUTPATIENT
Start: 2020-10-30 | End: 2020-11-10

## 2020-10-30 RX ORDER — LANOLIN ALCOHOL/MO/W.PET/CERES
1 CREAM (GRAM) TOPICAL
Qty: 0 | Refills: 0 | DISCHARGE
Start: 2020-10-30

## 2020-10-30 RX ORDER — AMLODIPINE BESYLATE 2.5 MG/1
1 TABLET ORAL
Qty: 14 | Refills: 0
Start: 2020-10-30 | End: 2020-11-12

## 2020-10-30 RX ORDER — AMLODIPINE BESYLATE 2.5 MG/1
4 TABLET ORAL
Qty: 56 | Refills: 0
Start: 2020-10-30 | End: 2020-11-12

## 2020-10-30 RX ORDER — ACETAMINOPHEN 500 MG
650 TABLET ORAL ONCE
Refills: 0 | Status: DISCONTINUED | OUTPATIENT
Start: 2020-10-30 | End: 2020-11-10

## 2020-10-30 RX ORDER — BENZTROPINE MESYLATE 1 MG
1 TABLET ORAL
Qty: 14 | Refills: 0
Start: 2020-10-30 | End: 2020-11-12

## 2020-10-30 RX ORDER — HALOPERIDOL DECANOATE 100 MG/ML
0 INJECTION INTRAMUSCULAR
Qty: 0 | Refills: 0 | DISCHARGE

## 2020-10-30 RX ORDER — BENZTROPINE MESYLATE 1 MG
0 TABLET ORAL
Qty: 0 | Refills: 0 | DISCHARGE

## 2020-10-30 RX ADMIN — AMLODIPINE BESYLATE 10 MILLIGRAM(S): 2.5 TABLET ORAL at 10:10

## 2020-10-30 RX ADMIN — Medication 45 MILLIGRAM(S): at 20:38

## 2020-10-30 RX ADMIN — Medication 1 MILLIGRAM(S): at 20:38

## 2020-10-30 RX ADMIN — MIRTAZAPINE 15 MILLIGRAM(S): 45 TABLET, ORALLY DISINTEGRATING ORAL at 20:38

## 2020-10-30 RX ADMIN — Medication 3 MILLIGRAM(S): at 20:38

## 2020-10-30 RX ADMIN — FAMOTIDINE 20 MILLIGRAM(S): 10 INJECTION INTRAVENOUS at 10:10

## 2020-10-31 RX ADMIN — Medication 3 MILLIGRAM(S): at 21:24

## 2020-10-31 RX ADMIN — AMLODIPINE BESYLATE 10 MILLIGRAM(S): 2.5 TABLET ORAL at 09:14

## 2020-10-31 RX ADMIN — Medication 1 MILLIGRAM(S): at 21:24

## 2020-10-31 RX ADMIN — Medication 45 MILLIGRAM(S): at 21:24

## 2020-10-31 RX ADMIN — MIRTAZAPINE 15 MILLIGRAM(S): 45 TABLET, ORALLY DISINTEGRATING ORAL at 21:24

## 2020-10-31 RX ADMIN — FAMOTIDINE 20 MILLIGRAM(S): 10 INJECTION INTRAVENOUS at 09:14

## 2020-11-01 RX ADMIN — MIRTAZAPINE 15 MILLIGRAM(S): 45 TABLET, ORALLY DISINTEGRATING ORAL at 20:51

## 2020-11-01 RX ADMIN — Medication 45 MILLIGRAM(S): at 20:51

## 2020-11-01 RX ADMIN — Medication 3 MILLIGRAM(S): at 20:51

## 2020-11-01 RX ADMIN — AMLODIPINE BESYLATE 10 MILLIGRAM(S): 2.5 TABLET ORAL at 08:56

## 2020-11-01 RX ADMIN — Medication 1 MILLIGRAM(S): at 20:51

## 2020-11-01 RX ADMIN — FAMOTIDINE 20 MILLIGRAM(S): 10 INJECTION INTRAVENOUS at 08:56

## 2020-11-02 PROCEDURE — 99231 SBSQ HOSP IP/OBS SF/LOW 25: CPT | Mod: GC,25

## 2020-11-02 PROCEDURE — 90853 GROUP PSYCHOTHERAPY: CPT

## 2020-11-02 RX ADMIN — Medication 3 MILLIGRAM(S): at 20:51

## 2020-11-02 RX ADMIN — MIRTAZAPINE 15 MILLIGRAM(S): 45 TABLET, ORALLY DISINTEGRATING ORAL at 20:51

## 2020-11-02 RX ADMIN — AMLODIPINE BESYLATE 10 MILLIGRAM(S): 2.5 TABLET ORAL at 08:46

## 2020-11-02 RX ADMIN — FAMOTIDINE 20 MILLIGRAM(S): 10 INJECTION INTRAVENOUS at 08:46

## 2020-11-02 RX ADMIN — Medication 45 MILLIGRAM(S): at 20:51

## 2020-11-02 RX ADMIN — Medication 1 MILLIGRAM(S): at 20:51

## 2020-11-03 PROCEDURE — 99231 SBSQ HOSP IP/OBS SF/LOW 25: CPT | Mod: GC,25

## 2020-11-03 PROCEDURE — 90853 GROUP PSYCHOTHERAPY: CPT

## 2020-11-03 RX ADMIN — Medication 45 MILLIGRAM(S): at 20:39

## 2020-11-03 RX ADMIN — FAMOTIDINE 20 MILLIGRAM(S): 10 INJECTION INTRAVENOUS at 08:45

## 2020-11-03 RX ADMIN — MIRTAZAPINE 15 MILLIGRAM(S): 45 TABLET, ORALLY DISINTEGRATING ORAL at 20:39

## 2020-11-03 RX ADMIN — Medication 3 MILLIGRAM(S): at 20:39

## 2020-11-03 RX ADMIN — Medication 1 MILLIGRAM(S): at 20:39

## 2020-11-03 RX ADMIN — AMLODIPINE BESYLATE 10 MILLIGRAM(S): 2.5 TABLET ORAL at 08:45

## 2020-11-04 PROCEDURE — 99231 SBSQ HOSP IP/OBS SF/LOW 25: CPT

## 2020-11-04 RX ADMIN — Medication 1 MILLIGRAM(S): at 21:13

## 2020-11-04 RX ADMIN — AMLODIPINE BESYLATE 10 MILLIGRAM(S): 2.5 TABLET ORAL at 09:00

## 2020-11-04 RX ADMIN — Medication 3 MILLIGRAM(S): at 21:13

## 2020-11-04 RX ADMIN — FAMOTIDINE 20 MILLIGRAM(S): 10 INJECTION INTRAVENOUS at 09:00

## 2020-11-04 RX ADMIN — MIRTAZAPINE 15 MILLIGRAM(S): 45 TABLET, ORALLY DISINTEGRATING ORAL at 21:13

## 2020-11-04 RX ADMIN — Medication 45 MILLIGRAM(S): at 21:13

## 2020-11-05 PROCEDURE — 90853 GROUP PSYCHOTHERAPY: CPT

## 2020-11-05 PROCEDURE — 99231 SBSQ HOSP IP/OBS SF/LOW 25: CPT | Mod: GC,25

## 2020-11-05 RX ADMIN — FAMOTIDINE 20 MILLIGRAM(S): 10 INJECTION INTRAVENOUS at 08:11

## 2020-11-05 RX ADMIN — AMLODIPINE BESYLATE 10 MILLIGRAM(S): 2.5 TABLET ORAL at 08:11

## 2020-11-05 RX ADMIN — MIRTAZAPINE 15 MILLIGRAM(S): 45 TABLET, ORALLY DISINTEGRATING ORAL at 20:34

## 2020-11-05 RX ADMIN — Medication 45 MILLIGRAM(S): at 20:34

## 2020-11-05 RX ADMIN — Medication 1 MILLIGRAM(S): at 20:34

## 2020-11-05 RX ADMIN — Medication 3 MILLIGRAM(S): at 20:34

## 2020-11-06 PROCEDURE — 99231 SBSQ HOSP IP/OBS SF/LOW 25: CPT | Mod: GC,25

## 2020-11-06 PROCEDURE — 90853 GROUP PSYCHOTHERAPY: CPT

## 2020-11-06 RX ADMIN — Medication 1 MILLIGRAM(S): at 21:39

## 2020-11-06 RX ADMIN — Medication 45 MILLIGRAM(S): at 21:39

## 2020-11-06 RX ADMIN — FAMOTIDINE 20 MILLIGRAM(S): 10 INJECTION INTRAVENOUS at 09:35

## 2020-11-06 RX ADMIN — MIRTAZAPINE 15 MILLIGRAM(S): 45 TABLET, ORALLY DISINTEGRATING ORAL at 21:39

## 2020-11-06 RX ADMIN — AMLODIPINE BESYLATE 10 MILLIGRAM(S): 2.5 TABLET ORAL at 09:35

## 2020-11-06 RX ADMIN — Medication 3 MILLIGRAM(S): at 21:39

## 2020-11-07 RX ADMIN — FAMOTIDINE 20 MILLIGRAM(S): 10 INJECTION INTRAVENOUS at 09:11

## 2020-11-07 RX ADMIN — Medication 1 MILLIGRAM(S): at 21:16

## 2020-11-07 RX ADMIN — Medication 45 MILLIGRAM(S): at 21:16

## 2020-11-07 RX ADMIN — AMLODIPINE BESYLATE 10 MILLIGRAM(S): 2.5 TABLET ORAL at 09:11

## 2020-11-07 RX ADMIN — Medication 3 MILLIGRAM(S): at 21:16

## 2020-11-07 RX ADMIN — MIRTAZAPINE 15 MILLIGRAM(S): 45 TABLET, ORALLY DISINTEGRATING ORAL at 21:16

## 2020-11-08 RX ADMIN — MIRTAZAPINE 15 MILLIGRAM(S): 45 TABLET, ORALLY DISINTEGRATING ORAL at 20:34

## 2020-11-08 RX ADMIN — Medication 3 MILLIGRAM(S): at 20:34

## 2020-11-08 RX ADMIN — AMLODIPINE BESYLATE 10 MILLIGRAM(S): 2.5 TABLET ORAL at 08:54

## 2020-11-08 RX ADMIN — Medication 45 MILLIGRAM(S): at 20:34

## 2020-11-08 RX ADMIN — FAMOTIDINE 20 MILLIGRAM(S): 10 INJECTION INTRAVENOUS at 08:54

## 2020-11-08 RX ADMIN — Medication 1 MILLIGRAM(S): at 20:34

## 2020-11-09 PROCEDURE — 99231 SBSQ HOSP IP/OBS SF/LOW 25: CPT | Mod: GC

## 2020-11-09 RX ADMIN — Medication 45 MILLIGRAM(S): at 20:47

## 2020-11-09 RX ADMIN — FAMOTIDINE 20 MILLIGRAM(S): 10 INJECTION INTRAVENOUS at 09:04

## 2020-11-09 RX ADMIN — Medication 3 MILLIGRAM(S): at 20:47

## 2020-11-09 RX ADMIN — AMLODIPINE BESYLATE 10 MILLIGRAM(S): 2.5 TABLET ORAL at 09:04

## 2020-11-09 RX ADMIN — MIRTAZAPINE 15 MILLIGRAM(S): 45 TABLET, ORALLY DISINTEGRATING ORAL at 20:47

## 2020-11-09 RX ADMIN — Medication 1 MILLIGRAM(S): at 20:47

## 2020-11-10 VITALS — TEMPERATURE: 98 F

## 2020-11-10 LAB — SARS-COV-2 RNA SPEC QL NAA+PROBE: SIGNIFICANT CHANGE UP

## 2020-11-10 RX ADMIN — AMLODIPINE BESYLATE 10 MILLIGRAM(S): 2.5 TABLET ORAL at 08:53

## 2020-11-10 RX ADMIN — FAMOTIDINE 20 MILLIGRAM(S): 10 INJECTION INTRAVENOUS at 08:54

## 2020-11-11 RX ORDER — HALOPERIDOL DECANOATE 100 MG/ML
150 INJECTION INTRAMUSCULAR
Qty: 5 | Refills: 0
Start: 2020-11-11 | End: 2020-11-11

## 2020-11-12 ENCOUNTER — APPOINTMENT (OUTPATIENT)
Dept: RHEUMATOLOGY | Facility: CLINIC | Age: 55
End: 2020-11-12
Payer: MEDICARE

## 2020-11-12 PROCEDURE — 96365 THER/PROPH/DIAG IV INF INIT: CPT

## 2020-11-12 PROCEDURE — 96366 THER/PROPH/DIAG IV INF ADDON: CPT

## 2020-12-11 ENCOUNTER — APPOINTMENT (OUTPATIENT)
Dept: RHEUMATOLOGY | Facility: CLINIC | Age: 55
End: 2020-12-11
Payer: MEDICARE

## 2020-12-11 PROCEDURE — 36415 COLL VENOUS BLD VENIPUNCTURE: CPT

## 2020-12-11 PROCEDURE — 96366 THER/PROPH/DIAG IV INF ADDON: CPT

## 2020-12-11 PROCEDURE — 96365 THER/PROPH/DIAG IV INF INIT: CPT

## 2020-12-15 PROBLEM — Z87.09 HISTORY OF ACUTE BRONCHITIS: Status: RESOLVED | Noted: 2017-10-25 | Resolved: 2020-12-15

## 2020-12-15 PROCEDURE — 99443: CPT | Mod: 95

## 2021-01-04 ENCOUNTER — APPOINTMENT (OUTPATIENT)
Dept: ALLERGY | Facility: CLINIC | Age: 56
End: 2021-01-04
Payer: MEDICARE

## 2021-01-04 VITALS
HEART RATE: 68 BPM | RESPIRATION RATE: 14 BRPM | BODY MASS INDEX: 29.52 KG/M2 | WEIGHT: 230 LBS | HEIGHT: 74 IN | TEMPERATURE: 98.1 F | DIASTOLIC BLOOD PRESSURE: 88 MMHG | SYSTOLIC BLOOD PRESSURE: 130 MMHG | OXYGEN SATURATION: 96 %

## 2021-01-04 PROCEDURE — 99214 OFFICE O/P EST MOD 30 MIN: CPT

## 2021-01-04 RX ORDER — MIRTAZAPINE 30 MG/1
TABLET, FILM COATED ORAL
Refills: 0 | Status: ACTIVE | COMMUNITY

## 2021-01-04 NOTE — HISTORY OF PRESENT ILLNESS
[de-identified] : Patient started Remeron for schizophrenia - no recurrent infections since he was last seen.   Smoking 1/2 ppd - to 1 ppd.    He has discussed his smoking with his psychiatrist on the phone every few months.    He is tolerating his infusions with no problems.

## 2021-01-04 NOTE — ASSESSMENT
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  \par Continue with present IVIG therapy

## 2021-01-04 NOTE — SOCIAL HISTORY
[de-identified] : lives  [FreeTextEntry2] : on disability [House] : [unfilled] lives in a house  [Cat] : cat [Single] : single

## 2021-01-04 NOTE — PHYSICAL EXAM
[Alert] : alert [Well Nourished] : well nourished [Healthy Appearance] : healthy appearance [No Acute Distress] : no acute distress [Well Developed] : well developed [Normal Voice/Communication] : normal voice communication [No Neck Mass] : no neck mass was observed [No LAD] : no lymphadenopathy [Normal Rate and Effort] : normal respiratory rhythm and effort [No Retractions] : no retractions [Normal Rate] : heart rate was normal  [Normal S1, S2] : normal S1 and S2 [Normal Cervical Lymph Nodes] : cervical [Skin Intact] : skin intact  [No Rash] : no rash [de-identified] : blunted affect

## 2021-01-07 ENCOUNTER — APPOINTMENT (OUTPATIENT)
Dept: RHEUMATOLOGY | Facility: CLINIC | Age: 56
End: 2021-01-07
Payer: MEDICARE

## 2021-01-07 PROCEDURE — 96365 THER/PROPH/DIAG IV INF INIT: CPT

## 2021-01-07 PROCEDURE — 96366 THER/PROPH/DIAG IV INF ADDON: CPT

## 2021-02-04 ENCOUNTER — APPOINTMENT (OUTPATIENT)
Dept: RHEUMATOLOGY | Facility: CLINIC | Age: 56
End: 2021-02-04
Payer: MEDICARE

## 2021-02-04 PROCEDURE — 96366 THER/PROPH/DIAG IV INF ADDON: CPT

## 2021-02-04 PROCEDURE — 96365 THER/PROPH/DIAG IV INF INIT: CPT

## 2021-02-10 ENCOUNTER — APPOINTMENT (OUTPATIENT)
Dept: ALLERGY | Facility: CLINIC | Age: 56
End: 2021-02-10
Payer: MEDICARE

## 2021-02-10 PROCEDURE — 99441: CPT | Mod: 95

## 2021-02-10 NOTE — ASSESSMENT
[FreeTextEntry1] : Patient with CVID and I have reinforced to sisters that Garrett is a high risk for severe complication from COVID infection and that he should be immunized.  All questions regarding immunization and reactions reviewed with both sisters - Brittany and Kenia.\par \par Patient's sisters informed that this interaction with be a billable encounter\par \par The majority of time (>50%) was spent on counseling and coordination of care with this patient and/or family member.  The approximate physician was 6 minutes for the diagnosis of CVID - concerns about COVID immunization.\par \par \par \par \par .

## 2021-02-10 NOTE — HISTORY OF PRESENT ILLNESS
[Home] : at home, [unfilled] , at the time of the visit. [Medical Office: ([facility])___] : at the medical office located in  [Family Member] : family member [Verbal consent obtained from patient] : the patient, [unfilled] [de-identified] : Discussion with sisters, Kenia and Brittany - Garrett is being offered COVID immunization at site by [facility] and both sisters were concerned about Garrett receiving the vaccination at this type of site and his need for immunization.

## 2021-03-04 ENCOUNTER — APPOINTMENT (OUTPATIENT)
Dept: RHEUMATOLOGY | Facility: CLINIC | Age: 56
End: 2021-03-04
Payer: MEDICARE

## 2021-03-04 PROCEDURE — 96366 THER/PROPH/DIAG IV INF ADDON: CPT

## 2021-03-04 PROCEDURE — 36415 COLL VENOUS BLD VENIPUNCTURE: CPT

## 2021-03-04 PROCEDURE — 96365 THER/PROPH/DIAG IV INF INIT: CPT

## 2021-03-23 PROCEDURE — 99443: CPT | Mod: 95

## 2021-04-01 ENCOUNTER — APPOINTMENT (OUTPATIENT)
Dept: RHEUMATOLOGY | Facility: CLINIC | Age: 56
End: 2021-04-01
Payer: MEDICARE

## 2021-04-01 PROCEDURE — 96365 THER/PROPH/DIAG IV INF INIT: CPT

## 2021-04-01 PROCEDURE — 96366 THER/PROPH/DIAG IV INF ADDON: CPT

## 2021-04-29 ENCOUNTER — APPOINTMENT (OUTPATIENT)
Dept: RHEUMATOLOGY | Facility: CLINIC | Age: 56
End: 2021-04-29
Payer: MEDICARE

## 2021-04-29 PROCEDURE — 96365 THER/PROPH/DIAG IV INF INIT: CPT

## 2021-04-29 PROCEDURE — 96366 THER/PROPH/DIAG IV INF ADDON: CPT

## 2021-05-20 NOTE — ASSESSMENT
[FreeTextEntry1] : CVID:\par \par Patient is presently being treated with Gammagard 600 mg/kg every 4 weeks - his IgG is 617 mg/dL and he has remained without any recent infections. \par \par Tobacco addiction:  I have discussed the absolute need for him to pursue options for smoking cessation and he will discuss with his PCP.  \par \par Mild intermittent asthma:\par \par Albuterol HFA 2 puffs QID prn \par \par RV 4 months No

## 2021-05-27 ENCOUNTER — APPOINTMENT (OUTPATIENT)
Dept: RHEUMATOLOGY | Facility: CLINIC | Age: 56
End: 2021-05-27
Payer: MEDICARE

## 2021-05-27 PROCEDURE — 96366 THER/PROPH/DIAG IV INF ADDON: CPT

## 2021-05-27 PROCEDURE — 96365 THER/PROPH/DIAG IV INF INIT: CPT

## 2021-05-27 NOTE — ED PROVIDER NOTE - SKIN, MLM
For Strep Throat as a Rule:    Please inform Patient to change tooth brush after 24 hours on Medication.  Salt water gargles 1/4 tsp salt in 8 oz of Warm Water 2 to 3 times daily   Ibuprofen or Tylenol as needed for fever/stomach/headache: use OTC dosing directions  Stay home from school 24 hours after starting medication or fever free  Avoid sharing liquids/ food utensils/ toys that contact saliva    Hand wash regularly     DanL     Skin normal color for race, warm, dry and intact. No evidence of rash.

## 2021-06-22 ENCOUNTER — APPOINTMENT (OUTPATIENT)
Dept: ALLERGY | Facility: CLINIC | Age: 56
End: 2021-06-22
Payer: MEDICARE

## 2021-06-22 VITALS
HEART RATE: 78 BPM | OXYGEN SATURATION: 92 % | TEMPERATURE: 98.7 F | SYSTOLIC BLOOD PRESSURE: 142 MMHG | BODY MASS INDEX: 32.1 KG/M2 | DIASTOLIC BLOOD PRESSURE: 94 MMHG | RESPIRATION RATE: 16 BRPM | WEIGHT: 250 LBS

## 2021-06-22 PROCEDURE — 94664 DEMO&/EVAL PT USE INHALER: CPT | Mod: 59

## 2021-06-22 PROCEDURE — 99214 OFFICE O/P EST MOD 30 MIN: CPT | Mod: 25

## 2021-06-22 NOTE — ASSESSMENT
[FreeTextEntry1] : CVID:\par \par Continue IgG replacement therapy\par \par Bronchitis with wheezing:\par \par Augmentin x 10 days\par Trelegy 100  1 puff in AM \par Reviewed inhaler technique\par \par Chronic tobacco addiction:\par \par Patient is unable to stop smoking unfortunately

## 2021-06-22 NOTE — PHYSICAL EXAM
[No Neck Mass] : no neck mass was observed [No LAD] : no lymphadenopathy [No Thyroid Mass] : no thyroid mass [Supple] : the neck was supple [Wheezing] : wheezing was heard [Normal Rate] : heart rate was normal  [Normal S1, S2] : normal S1 and S2 [No murmur] : no murmur [Regular Rhythm] : with a regular rhythm [Skin Intact] : skin intact  [No clubbing] : no clubbing [No Cyanosis] : no cyanosis [de-identified] : mild exp wheeze  [de-identified] : depressed affect

## 2021-06-22 NOTE — SOCIAL HISTORY
[de-identified] : lives  [FreeTextEntry2] : on disability [House] : [unfilled] lives in a house  [Cat] : cat [Single] : single

## 2021-06-22 NOTE — HISTORY OF PRESENT ILLNESS
[de-identified] : Patient started Augmentin on Friday - coughing up discolored mucus.     Still smoking - persistent cough but less than before antibiotic. He has no inhaler

## 2021-06-24 ENCOUNTER — APPOINTMENT (OUTPATIENT)
Dept: RHEUMATOLOGY | Facility: CLINIC | Age: 56
End: 2021-06-24
Payer: MEDICARE

## 2021-06-24 PROCEDURE — 36415 COLL VENOUS BLD VENIPUNCTURE: CPT

## 2021-06-24 PROCEDURE — 96365 THER/PROPH/DIAG IV INF INIT: CPT

## 2021-06-24 PROCEDURE — 96366 THER/PROPH/DIAG IV INF ADDON: CPT

## 2021-07-16 ENCOUNTER — EMERGENCY (EMERGENCY)
Facility: HOSPITAL | Age: 56
LOS: 1 days | Discharge: HOME | End: 2021-07-16
Attending: EMERGENCY MEDICINE | Admitting: EMERGENCY MEDICINE
Payer: MEDICARE

## 2021-07-16 VITALS
TEMPERATURE: 98 F | SYSTOLIC BLOOD PRESSURE: 136 MMHG | OXYGEN SATURATION: 100 % | HEART RATE: 93 BPM | HEIGHT: 74 IN | RESPIRATION RATE: 18 BRPM | DIASTOLIC BLOOD PRESSURE: 93 MMHG

## 2021-07-16 DIAGNOSIS — J34.2 DEVIATED NASAL SEPTUM: Chronic | ICD-10-CM

## 2021-07-16 PROCEDURE — 99283 EMERGENCY DEPT VISIT LOW MDM: CPT

## 2021-07-16 NOTE — PROVIDER CONTACT NOTE (OTHER) - ASSESSMENT
to give his 's contact.  His sister gave the  as Caroline [phone] from [facility].  Caroline said pt. can  travel independently via taxi.  A taxi was set up for pt. thru  [employer] [phone] [facility] [phone] [facility] [address] ELIAN escorted pt. to the taxi.  SW alerted pt's sister of his discharge to apartment.

## 2021-07-16 NOTE — ED PROVIDER NOTE - PATIENT PORTAL LINK FT
You can access the FollowMyHealth Patient Portal offered by [facility] by registering at the following website: [email or web address]. By joining [employer]’s FollowMyHealth portal, you will also be able to view your health information using other applications (apps) compatible with our system.

## 2021-07-16 NOTE — ED PROVIDER NOTE - OBJECTIVE STATEMENT
54 y/o male with h/o schizoaffective d/o here with c/o anxiety.  States that he was thinking about his past this morning and began to feel guilt about things that had happened before.  He states that these feelings have since dissipated and now he feels "fine."  No si/hi, a/v halluc, etoh or illegal drug use.  States he follows with psychiatry, Dr. Hollie Cook, last visit two months ago, no issues at that time.  He is compliant with his medications.

## 2021-07-16 NOTE — ED PROVIDER NOTE - CLINICAL SUMMARY MEDICAL DECISION MAKING FREE TEXT BOX
56 y/o male h/o schizoaffective d/o here with episode of anxiety which resolved without medication.  Feels back to his baseline.  Requesting phone to call cab, sasha Dawson to assist with pt calling cab to return home.  He will f/u with his psychiatrist, to return if any worsening or concerning sx.

## 2021-07-16 NOTE — ED ADULT TRIAGE NOTE - CHIEF COMPLAINT QUOTE
c/o having " anxiety due to past issues" denies IS, Hi or AVH, calm and cooperative, answers to questions appropriately, hx of DM, bipolar 1 disorder.

## 2021-07-16 NOTE — PROVIDER CONTACT NOTE (OTHER) - BACKGROUND
ED staff asked ED SW to arrange transportation for pt. back to his Group Home apartment.  Pt. is medically cleared.  SW contacted pt's sister, Brittany Arias [phone] as pt. was unable

## 2021-07-16 NOTE — ED ADULT NURSE NOTE - OBJECTIVE STATEMENT
55 year old male received from triage to results waiting. Per RN Samir, pt to be discharged from triage and to wait in results waiting area for social work to organize transportation home. Pt reported to ED for feelings of anxiety and guilt "due to past issues" as per triage note. Denies SI/HI/AH/VH. Pt calm and cooperative at this time sitting in chair comfortably. Pt in non acute distress. Respirations even and unlabored. VS as noted. Safety maintained.

## 2021-07-22 ENCOUNTER — APPOINTMENT (OUTPATIENT)
Dept: RHEUMATOLOGY | Facility: CLINIC | Age: 56
End: 2021-07-22
Payer: MEDICARE

## 2021-07-22 PROCEDURE — 96366 THER/PROPH/DIAG IV INF ADDON: CPT

## 2021-07-22 PROCEDURE — 96365 THER/PROPH/DIAG IV INF INIT: CPT

## 2021-08-19 ENCOUNTER — APPOINTMENT (OUTPATIENT)
Dept: RHEUMATOLOGY | Facility: CLINIC | Age: 56
End: 2021-08-19
Payer: MEDICARE

## 2021-08-19 PROCEDURE — 96366 THER/PROPH/DIAG IV INF ADDON: CPT

## 2021-08-19 PROCEDURE — 96365 THER/PROPH/DIAG IV INF INIT: CPT

## 2021-08-19 PROCEDURE — 36415 COLL VENOUS BLD VENIPUNCTURE: CPT

## 2021-08-25 ENCOUNTER — APPOINTMENT (OUTPATIENT)
Dept: ALLERGY | Facility: CLINIC | Age: 56
End: 2021-08-25
Payer: MEDICARE

## 2021-08-25 ENCOUNTER — NON-APPOINTMENT (OUTPATIENT)
Age: 56
End: 2021-08-25

## 2021-08-25 VITALS
HEART RATE: 84 BPM | TEMPERATURE: 97.5 F | DIASTOLIC BLOOD PRESSURE: 98 MMHG | RESPIRATION RATE: 16 BRPM | HEIGHT: 74 IN | OXYGEN SATURATION: 95 % | BODY MASS INDEX: 32.08 KG/M2 | SYSTOLIC BLOOD PRESSURE: 160 MMHG | WEIGHT: 250 LBS

## 2021-08-25 PROCEDURE — 99214 OFFICE O/P EST MOD 30 MIN: CPT

## 2021-08-25 RX ORDER — AMOXICILLIN AND CLAVULANATE POTASSIUM 875; 125 MG/1; MG/1
875-125 TABLET, COATED ORAL
Qty: 20 | Refills: 0 | Status: DISCONTINUED | COMMUNITY
Start: 2021-06-18 | End: 2021-08-25

## 2021-08-25 NOTE — SOCIAL HISTORY
[House] : [unfilled] lives in a house  [Cat] : cat [Single] : single [de-identified] : lives  [FreeTextEntry2] : on disability

## 2021-08-25 NOTE — PHYSICAL EXAM
[Alert] : alert [No Acute Distress] : no acute distress [No Neck Mass] : no neck mass was observed [No LAD] : no lymphadenopathy [Wheezing] : wheezing was heard [Normal Rate] : heart rate was normal  [Normal S1, S2] : normal S1 and S2 [No murmur] : no murmur [Regular Rhythm] : with a regular rhythm [Normal Cervical Lymph Nodes] : cervical [Skin Intact] : skin intact  [No Rash] : no rash [Normal Mood] : mood was normal [Normal Affect] : affect was normal [Judgment and Insight Age Appropriate] : judgement and insight is age appropriate [Alert, Awake, Oriented as Age-Appropriate] : alert, awake, oriented as age appropriate [de-identified] : obese male  [de-identified] : scant exp wheeze

## 2021-08-25 NOTE — ASSESSMENT
[FreeTextEntry1] : CVID on immunoglobulin replacement therapy \par \par Patient advised to get COVID booster shot\par He will discuss ED and sex drive with his PCP\par Check testosterone level

## 2021-08-25 NOTE — HISTORY OF PRESENT ILLNESS
[de-identified] : Patient has not received booster shot as of yet - he requested testosterone - problems with sex drive - patient continues to smoke cigarettes.

## 2021-09-16 ENCOUNTER — APPOINTMENT (OUTPATIENT)
Dept: RHEUMATOLOGY | Facility: CLINIC | Age: 56
End: 2021-09-16
Payer: MEDICARE

## 2021-09-16 PROCEDURE — 96365 THER/PROPH/DIAG IV INF INIT: CPT

## 2021-09-16 PROCEDURE — 96366 THER/PROPH/DIAG IV INF ADDON: CPT

## 2021-10-12 PROCEDURE — 99443: CPT | Mod: 95

## 2021-10-14 ENCOUNTER — APPOINTMENT (OUTPATIENT)
Dept: RHEUMATOLOGY | Facility: CLINIC | Age: 56
End: 2021-10-14
Payer: MEDICARE

## 2021-10-14 PROCEDURE — 96365 THER/PROPH/DIAG IV INF INIT: CPT

## 2021-10-14 PROCEDURE — 96366 THER/PROPH/DIAG IV INF ADDON: CPT

## 2021-11-11 ENCOUNTER — APPOINTMENT (OUTPATIENT)
Dept: RHEUMATOLOGY | Facility: CLINIC | Age: 56
End: 2021-11-11
Payer: MEDICARE

## 2021-11-11 PROCEDURE — 96366 THER/PROPH/DIAG IV INF ADDON: CPT

## 2021-11-11 PROCEDURE — 96365 THER/PROPH/DIAG IV INF INIT: CPT

## 2021-12-09 ENCOUNTER — APPOINTMENT (OUTPATIENT)
Dept: RHEUMATOLOGY | Facility: CLINIC | Age: 56
End: 2021-12-09
Payer: MEDICARE

## 2021-12-09 PROCEDURE — 96365 THER/PROPH/DIAG IV INF INIT: CPT

## 2021-12-09 PROCEDURE — 36415 COLL VENOUS BLD VENIPUNCTURE: CPT

## 2021-12-09 PROCEDURE — 96366 THER/PROPH/DIAG IV INF ADDON: CPT

## 2021-12-13 NOTE — ED ADULT TRIAGE NOTE - PAIN RATING/NUMBER SCALE (0-10): ACTIVITY
A refill request was received for:  Requested Prescriptions     Pending Prescriptions Disp Refills   • SUMAtriptan 50 MG Oral Tab 7 tablet 0     Sig: Take 1 tablet (50 mg total) by mouth every 2 (two) hours as needed for Migraine. Max 200mg in 24 hrs. 0

## 2022-01-06 ENCOUNTER — APPOINTMENT (OUTPATIENT)
Dept: RHEUMATOLOGY | Facility: CLINIC | Age: 57
End: 2022-01-06
Payer: MEDICARE

## 2022-01-06 PROCEDURE — 96365 THER/PROPH/DIAG IV INF INIT: CPT

## 2022-01-06 PROCEDURE — 96366 THER/PROPH/DIAG IV INF ADDON: CPT

## 2022-01-10 ENCOUNTER — TRANSCRIPTION ENCOUNTER (OUTPATIENT)
Age: 57
End: 2022-01-10

## 2022-01-11 ENCOUNTER — INPATIENT (INPATIENT)
Facility: HOSPITAL | Age: 57
LOS: 1 days | Discharge: HOME | End: 2022-01-13
Attending: HOSPITALIST | Admitting: HOSPITALIST
Payer: MEDICARE

## 2022-01-11 VITALS
HEIGHT: 74 IN | DIASTOLIC BLOOD PRESSURE: 104 MMHG | RESPIRATION RATE: 20 BRPM | SYSTOLIC BLOOD PRESSURE: 189 MMHG | HEART RATE: 83 BPM | TEMPERATURE: 98 F | OXYGEN SATURATION: 98 %

## 2022-01-11 DIAGNOSIS — J34.2 DEVIATED NASAL SEPTUM: Chronic | ICD-10-CM

## 2022-01-11 DIAGNOSIS — M86.9 OSTEOMYELITIS, UNSPECIFIED: ICD-10-CM

## 2022-01-11 LAB
ALBUMIN SERPL ELPH-MCNC: 4 G/DL — SIGNIFICANT CHANGE UP (ref 3.3–5)
ALP SERPL-CCNC: 156 U/L — HIGH (ref 40–120)
ALT FLD-CCNC: 28 U/L — SIGNIFICANT CHANGE UP (ref 4–41)
ANION GAP SERPL CALC-SCNC: 10 MMOL/L — SIGNIFICANT CHANGE UP (ref 7–14)
AST SERPL-CCNC: 36 U/L — SIGNIFICANT CHANGE UP (ref 4–40)
BASOPHILS # BLD AUTO: 0.05 K/UL — SIGNIFICANT CHANGE UP (ref 0–0.2)
BASOPHILS NFR BLD AUTO: 0.6 % — SIGNIFICANT CHANGE UP (ref 0–2)
BILIRUB SERPL-MCNC: <0.2 MG/DL — SIGNIFICANT CHANGE UP (ref 0.2–1.2)
BUN SERPL-MCNC: 7 MG/DL — SIGNIFICANT CHANGE UP (ref 7–23)
CALCIUM SERPL-MCNC: 9.5 MG/DL — SIGNIFICANT CHANGE UP (ref 8.4–10.5)
CHLORIDE SERPL-SCNC: 95 MMOL/L — LOW (ref 98–107)
CO2 SERPL-SCNC: 31 MMOL/L — SIGNIFICANT CHANGE UP (ref 22–31)
CREAT SERPL-MCNC: 0.71 MG/DL — SIGNIFICANT CHANGE UP (ref 0.5–1.3)
EOSINOPHIL # BLD AUTO: 0.31 K/UL — SIGNIFICANT CHANGE UP (ref 0–0.5)
EOSINOPHIL NFR BLD AUTO: 3.6 % — SIGNIFICANT CHANGE UP (ref 0–6)
GLUCOSE SERPL-MCNC: 104 MG/DL — HIGH (ref 70–99)
HCT VFR BLD CALC: 45.6 % — SIGNIFICANT CHANGE UP (ref 39–50)
HGB BLD-MCNC: 14.5 G/DL — SIGNIFICANT CHANGE UP (ref 13–17)
IANC: 5.57 K/UL — SIGNIFICANT CHANGE UP (ref 1.5–8.5)
IMM GRANULOCYTES NFR BLD AUTO: 0.4 % — SIGNIFICANT CHANGE UP (ref 0–1.5)
LYMPHOCYTES # BLD AUTO: 1.6 K/UL — SIGNIFICANT CHANGE UP (ref 1–3.3)
LYMPHOCYTES # BLD AUTO: 18.7 % — SIGNIFICANT CHANGE UP (ref 13–44)
MCHC RBC-ENTMCNC: 26.4 PG — LOW (ref 27–34)
MCHC RBC-ENTMCNC: 31.8 GM/DL — LOW (ref 32–36)
MCV RBC AUTO: 83.1 FL — SIGNIFICANT CHANGE UP (ref 80–100)
MONOCYTES # BLD AUTO: 1 K/UL — HIGH (ref 0–0.9)
MONOCYTES NFR BLD AUTO: 11.7 % — SIGNIFICANT CHANGE UP (ref 2–14)
NEUTROPHILS # BLD AUTO: 5.57 K/UL — SIGNIFICANT CHANGE UP (ref 1.8–7.4)
NEUTROPHILS NFR BLD AUTO: 65 % — SIGNIFICANT CHANGE UP (ref 43–77)
NRBC # BLD: 0 /100 WBCS — SIGNIFICANT CHANGE UP
NRBC # FLD: 0 K/UL — SIGNIFICANT CHANGE UP
PLATELET # BLD AUTO: 241 K/UL — SIGNIFICANT CHANGE UP (ref 150–400)
POTASSIUM SERPL-MCNC: 4.1 MMOL/L — SIGNIFICANT CHANGE UP (ref 3.5–5.3)
POTASSIUM SERPL-SCNC: 4.1 MMOL/L — SIGNIFICANT CHANGE UP (ref 3.5–5.3)
PROT SERPL-MCNC: 7.4 G/DL — SIGNIFICANT CHANGE UP (ref 6–8.3)
RBC # BLD: 5.49 M/UL — SIGNIFICANT CHANGE UP (ref 4.2–5.8)
RBC # FLD: 15 % — HIGH (ref 10.3–14.5)
SODIUM SERPL-SCNC: 136 MMOL/L — SIGNIFICANT CHANGE UP (ref 135–145)
WBC # BLD: 8.56 K/UL — SIGNIFICANT CHANGE UP (ref 3.8–10.5)
WBC # FLD AUTO: 8.56 K/UL — SIGNIFICANT CHANGE UP (ref 3.8–10.5)

## 2022-01-11 PROCEDURE — 99285 EMERGENCY DEPT VISIT HI MDM: CPT | Mod: GC

## 2022-01-11 PROCEDURE — 71046 X-RAY EXAM CHEST 2 VIEWS: CPT | Mod: 26

## 2022-01-11 PROCEDURE — 73130 X-RAY EXAM OF HAND: CPT | Mod: 26,LT

## 2022-01-11 RX ORDER — PIPERACILLIN AND TAZOBACTAM 4; .5 G/20ML; G/20ML
3.38 INJECTION, POWDER, LYOPHILIZED, FOR SOLUTION INTRAVENOUS ONCE
Refills: 0 | Status: COMPLETED | OUTPATIENT
Start: 2022-01-11 | End: 2022-01-11

## 2022-01-11 RX ORDER — HYDRALAZINE HCL 50 MG
25 TABLET ORAL ONCE
Refills: 0 | Status: COMPLETED | OUTPATIENT
Start: 2022-01-11 | End: 2022-01-11

## 2022-01-11 RX ORDER — ACETAMINOPHEN 500 MG
975 TABLET ORAL ONCE
Refills: 0 | Status: COMPLETED | OUTPATIENT
Start: 2022-01-11 | End: 2022-01-11

## 2022-01-11 RX ORDER — OXYCODONE HYDROCHLORIDE 5 MG/1
5 TABLET ORAL ONCE
Refills: 0 | Status: DISCONTINUED | OUTPATIENT
Start: 2022-01-11 | End: 2022-01-11

## 2022-01-11 RX ORDER — VANCOMYCIN HCL 1 G
1000 VIAL (EA) INTRAVENOUS ONCE
Refills: 0 | Status: COMPLETED | OUTPATIENT
Start: 2022-01-11 | End: 2022-01-11

## 2022-01-11 RX ADMIN — Medication 25 MILLIGRAM(S): at 21:31

## 2022-01-11 RX ADMIN — Medication 1000 MILLIGRAM(S): at 22:17

## 2022-01-11 RX ADMIN — PIPERACILLIN AND TAZOBACTAM 3.38 GRAM(S): 4; .5 INJECTION, POWDER, LYOPHILIZED, FOR SOLUTION INTRAVENOUS at 21:03

## 2022-01-11 RX ADMIN — Medication 975 MILLIGRAM(S): at 22:10

## 2022-01-11 RX ADMIN — Medication 250 MILLIGRAM(S): at 21:13

## 2022-01-11 RX ADMIN — OXYCODONE HYDROCHLORIDE 5 MILLIGRAM(S): 5 TABLET ORAL at 23:51

## 2022-01-11 RX ADMIN — Medication 975 MILLIGRAM(S): at 21:12

## 2022-01-11 RX ADMIN — PIPERACILLIN AND TAZOBACTAM 200 GRAM(S): 4; .5 INJECTION, POWDER, LYOPHILIZED, FOR SOLUTION INTRAVENOUS at 19:14

## 2022-01-11 NOTE — ED PROVIDER NOTE - NSICDXPASTMEDICALHX_GEN_ALL_CORE_FT
PAST MEDICAL HISTORY:  Bipolar 1 disorder     Dental caries     DM (diabetes mellitus)     HTN (hypertension)     Hypertriglyceridemia     IgG deficiency     Smoker

## 2022-01-11 NOTE — ED PROVIDER NOTE - ATTENDING CONTRIBUTION TO CARE
Attending Attestation: Dr. Duckworth  I have personally performed a history and physical examination of the patient and discussed management with the resident as well as the patient.  I reviewed the resident's note and agree with the documented findings and plan of care.  I have authored and modified critical sections of the Provider Note, including but not limited to HPI, Physical Exam and MDM. 57 yo M w/ hx CVID presenting with L pointer finger pain with ulceration/pus at tip, erythema and swelling extending proximally to the base of the finger. Likely felon needing I&D. Will get hand Xray to eval for gas. Sned labs, Hand for I&D, and likely admit for IV Abx.

## 2022-01-11 NOTE — ED PROVIDER NOTE - NSICDXFAMILYHX_GEN_ALL_CORE_FT
FAMILY HISTORY:  Father  Still living? No  Family history of throat cancer, Age at diagnosis: Age Unknown    Mother  Still living? Unknown  Family history of leukemia, Age at diagnosis: Age Unknown

## 2022-01-11 NOTE — ED PROVIDER NOTE - PHYSICAL EXAMINATION
General appearance: NAD, conversant, afebrile    Eyes: anicteric sclerae, CORBIN, EOMI   HENT: Atraumatic; oropharynx clear, MMM and no ulcerations, no pharyngeal erythema or exudate   Neck: Trachea midline; Full range of motion, supple   Pulm: CTA bl, normal respiratory effort and no intercostal retractions, normal work of breathing   CV: RRR, No murmurs, rubs, or gallops. 2+ peripheral pulses.   Abdomen: Soft, non-tender, non-distended; no guarding or rebound   Extremities: No peripheral edema or extremity lymphadenopathy. 5/5 strength in all four extremities.   Skin: L pointer finger ulceration on medial tip of finger, erythema and swelling extending proximally to base of finger   Psych: Appropriate affect, cooperative; alert and oriented to person, place and time

## 2022-01-11 NOTE — ED PROVIDER NOTE - CARE PLAN
1 Principal Discharge DX:	Osteomyelitis of finger   Principal Discharge DX:	Felon of finger of left hand

## 2022-01-11 NOTE — ED PROVIDER NOTE - PROGRESS NOTE DETAILS
Akanksha Hernandez- left message for Dr. Fernández for I&D. Akanksha Hernandez- Dr. Fernández I&D'd, XRr shows likely osteomyelitis. Will admit for IV abx. Updated pt, he agrees. Dr. Fernández recommends ID consult and to follow up with him once outpt. Paged hospitalist for admission. PCP Dr. Saritha Burciaga.

## 2022-01-11 NOTE — ED ADULT NURSE REASSESSMENT NOTE - NS ED NURSE REASSESS COMMENT FT1
PT received at 830pm change of shift. BP= 192/113 as noted. PA aware. PT states has not taken normally prescribed HTN regimen at home. Will medicate as per orders. PT admitted, awaiting bed assignment

## 2022-01-11 NOTE — ED PROVIDER NOTE - CLINICAL SUMMARY MEDICAL DECISION MAKING FREE TEXT BOX
57yo M w/ hx CVID presenting with L pointer finger pain with ulceration/pus at tip, erythema and swelling extending proximally to the base of the finger. Likely felon needing I&D. Will get hand Xray to eval for gas. Given worsening of infection on augmentin, hx CVID, will consult hand surgeon. 55 yo M w/ hx CVID presenting with L pointer finger pain with ulceration/pus at tip, erythema and swelling extending proximally to the base of the finger. Likely felon needing I&D. Will get hand Xray to eval for gas. Sned labs, Hand for I&D, and likely admit for IV Abx.

## 2022-01-11 NOTE — ED PROVIDER NOTE - MUSCULOSKELETAL, MLM
Spine appears normal, range of motion is not limited, no muscle or joint tenderness EXCEPT for tender swelling of entire left 2nd digit with e/o underlying abscess.

## 2022-01-11 NOTE — ED ADULT NURSE NOTE - OBJECTIVE STATEMENT
pt presenting to  AxOx[facility] ambulatory at baseline with c/o left pointer finger infection. Pt states he had a boil on his finger and drained it with a needle, now endorsing swelling and pain to finger. Denies fever or any other complaints. IV established with 20g in RAC. Labs drawn and sent. MD at bedside, will continue to monitor.

## 2022-01-11 NOTE — ED PROVIDER NOTE - OBJECTIVE STATEMENT
57yo M w/ hx CVID presenting with finger infection. He had a pimple on his L pointer finger and he used a needle to drain it. He was given augmentin which he took for 3 days. Initially the redness was at the tip of his finger, but it spread proximally to the base of his finger and the spot that he drained became ulcerated with pus underneath. He has pain with flexing the finger and with palpation. No fever, chills, n/v, abd pain. 55yo M w/ hx CVID presenting with finger infection. He had a pimple on his L pointer finger and he used a needle to drain it. He was given augmentin which he took for 3 days. Initially the redness was at the tip of his finger, but it spread proximally to the base of his finger and the spot that he drained became ulcerated with pus underneath, despite taking Abx. He has pain with flexing the finger and with palpation. No fever, chills, n/v, abd pain. Feels well otherwise.

## 2022-01-11 NOTE — ED PROVIDER NOTE - NSICDXPASTSURGICALHX_GEN_ALL_CORE_FT
PAST SURGICAL HISTORY:  Deviated septum     Loss of teeth due to extraction All teeth due to dental carries

## 2022-01-12 ENCOUNTER — TRANSCRIPTION ENCOUNTER (OUTPATIENT)
Age: 57
End: 2022-01-12

## 2022-01-12 DIAGNOSIS — M86.149 OTHER ACUTE OSTEOMYELITIS, UNSPECIFIED HAND: ICD-10-CM

## 2022-01-12 DIAGNOSIS — I10 ESSENTIAL (PRIMARY) HYPERTENSION: ICD-10-CM

## 2022-01-12 DIAGNOSIS — I16.0 HYPERTENSIVE URGENCY: ICD-10-CM

## 2022-01-12 DIAGNOSIS — E11.9 TYPE 2 DIABETES MELLITUS WITHOUT COMPLICATIONS: ICD-10-CM

## 2022-01-12 DIAGNOSIS — Z79.899 OTHER LONG TERM (CURRENT) DRUG THERAPY: ICD-10-CM

## 2022-01-12 DIAGNOSIS — R60.0 LOCALIZED EDEMA: ICD-10-CM

## 2022-01-12 DIAGNOSIS — D80.1 NONFAMILIAL HYPOGAMMAGLOBULINEMIA: ICD-10-CM

## 2022-01-12 DIAGNOSIS — K08.199 COMPLETE LOSS OF TEETH DUE TO OTHER SPECIFIED CAUSE, UNSPECIFIED CLASS: Chronic | ICD-10-CM

## 2022-01-12 DIAGNOSIS — F25.9 SCHIZOAFFECTIVE DISORDER, UNSPECIFIED: ICD-10-CM

## 2022-01-12 DIAGNOSIS — Z29.9 ENCOUNTER FOR PROPHYLACTIC MEASURES, UNSPECIFIED: ICD-10-CM

## 2022-01-12 DIAGNOSIS — L02.519 CUTANEOUS ABSCESS OF UNSPECIFIED HAND: ICD-10-CM

## 2022-01-12 LAB
A1C WITH ESTIMATED AVERAGE GLUCOSE RESULT: 6.4 % — HIGH (ref 4–5.6)
ANION GAP SERPL CALC-SCNC: 13 MMOL/L — SIGNIFICANT CHANGE UP (ref 7–14)
BUN SERPL-MCNC: 7 MG/DL — SIGNIFICANT CHANGE UP (ref 7–23)
CALCIUM SERPL-MCNC: 8.9 MG/DL — SIGNIFICANT CHANGE UP (ref 8.4–10.5)
CHLORIDE SERPL-SCNC: 96 MMOL/L — LOW (ref 98–107)
CHOLEST SERPL-MCNC: 142 MG/DL — SIGNIFICANT CHANGE UP
CO2 SERPL-SCNC: 26 MMOL/L — SIGNIFICANT CHANGE UP (ref 22–31)
CREAT SERPL-MCNC: 0.68 MG/DL — SIGNIFICANT CHANGE UP (ref 0.5–1.3)
CRP SERPL-MCNC: 29.4 MG/L — HIGH
ERYTHROCYTE [SEDIMENTATION RATE] IN BLOOD: 34 MM/HR — HIGH (ref 1–15)
ESTIMATED AVERAGE GLUCOSE: 137 — SIGNIFICANT CHANGE UP
GLUCOSE BLDC GLUCOMTR-MCNC: 106 MG/DL — HIGH (ref 70–99)
GLUCOSE BLDC GLUCOMTR-MCNC: 122 MG/DL — HIGH (ref 70–99)
GLUCOSE BLDC GLUCOMTR-MCNC: 138 MG/DL — HIGH (ref 70–99)
GLUCOSE BLDC GLUCOMTR-MCNC: 246 MG/DL — HIGH (ref 70–99)
GLUCOSE BLDC GLUCOMTR-MCNC: 81 MG/DL — SIGNIFICANT CHANGE UP (ref 70–99)
GLUCOSE SERPL-MCNC: 128 MG/DL — HIGH (ref 70–99)
HCT VFR BLD CALC: 44 % — SIGNIFICANT CHANGE UP (ref 39–50)
HDLC SERPL-MCNC: 32 MG/DL — LOW
HGB BLD-MCNC: 14.1 G/DL — SIGNIFICANT CHANGE UP (ref 13–17)
LIPID PNL WITH DIRECT LDL SERPL: 84 MG/DL — SIGNIFICANT CHANGE UP
MAGNESIUM SERPL-MCNC: 1.9 MG/DL — SIGNIFICANT CHANGE UP (ref 1.6–2.6)
MCHC RBC-ENTMCNC: 26.6 PG — LOW (ref 27–34)
MCHC RBC-ENTMCNC: 32 GM/DL — SIGNIFICANT CHANGE UP (ref 32–36)
MCV RBC AUTO: 82.9 FL — SIGNIFICANT CHANGE UP (ref 80–100)
NON HDL CHOLESTEROL: 110 MG/DL — SIGNIFICANT CHANGE UP
NRBC # BLD: 0 /100 WBCS — SIGNIFICANT CHANGE UP
NRBC # FLD: 0 K/UL — SIGNIFICANT CHANGE UP
NT-PROBNP SERPL-SCNC: 45 PG/ML — SIGNIFICANT CHANGE UP
PHOSPHATE SERPL-MCNC: 3.2 MG/DL — SIGNIFICANT CHANGE UP (ref 2.5–4.5)
PLATELET # BLD AUTO: 215 K/UL — SIGNIFICANT CHANGE UP (ref 150–400)
POTASSIUM SERPL-MCNC: 3.5 MMOL/L — SIGNIFICANT CHANGE UP (ref 3.5–5.3)
POTASSIUM SERPL-SCNC: 3.5 MMOL/L — SIGNIFICANT CHANGE UP (ref 3.5–5.3)
RBC # BLD: 5.31 M/UL — SIGNIFICANT CHANGE UP (ref 4.2–5.8)
RBC # FLD: 14.8 % — HIGH (ref 10.3–14.5)
SARS-COV-2 RNA SPEC QL NAA+PROBE: SIGNIFICANT CHANGE UP
SODIUM SERPL-SCNC: 135 MMOL/L — SIGNIFICANT CHANGE UP (ref 135–145)
TRIGL SERPL-MCNC: 132 MG/DL — SIGNIFICANT CHANGE UP
TSH SERPL-MCNC: 3.07 UIU/ML — SIGNIFICANT CHANGE UP (ref 0.27–4.2)
WBC # BLD: 8.29 K/UL — SIGNIFICANT CHANGE UP (ref 3.8–10.5)
WBC # FLD AUTO: 8.29 K/UL — SIGNIFICANT CHANGE UP (ref 3.8–10.5)

## 2022-01-12 PROCEDURE — 12345: CPT | Mod: NC

## 2022-01-12 PROCEDURE — 99223 1ST HOSP IP/OBS HIGH 75: CPT

## 2022-01-12 PROCEDURE — 99222 1ST HOSP IP/OBS MODERATE 55: CPT | Mod: GC

## 2022-01-12 PROCEDURE — 93970 EXTREMITY STUDY: CPT | Mod: 26

## 2022-01-12 PROCEDURE — 93306 TTE W/DOPPLER COMPLETE: CPT | Mod: 26

## 2022-01-12 RX ORDER — DEXTROSE 50 % IN WATER 50 %
12.5 SYRINGE (ML) INTRAVENOUS ONCE
Refills: 0 | Status: DISCONTINUED | OUTPATIENT
Start: 2022-01-12 | End: 2022-01-13

## 2022-01-12 RX ORDER — DEXTROSE 50 % IN WATER 50 %
25 SYRINGE (ML) INTRAVENOUS ONCE
Refills: 0 | Status: DISCONTINUED | OUTPATIENT
Start: 2022-01-12 | End: 2022-01-13

## 2022-01-12 RX ORDER — DEXTROSE 50 % IN WATER 50 %
15 SYRINGE (ML) INTRAVENOUS ONCE
Refills: 0 | Status: DISCONTINUED | OUTPATIENT
Start: 2022-01-12 | End: 2022-01-13

## 2022-01-12 RX ORDER — PIPERACILLIN AND TAZOBACTAM 4; .5 G/20ML; G/20ML
3.38 INJECTION, POWDER, LYOPHILIZED, FOR SOLUTION INTRAVENOUS EVERY 8 HOURS
Refills: 0 | Status: DISCONTINUED | OUTPATIENT
Start: 2022-01-12 | End: 2022-01-12

## 2022-01-12 RX ORDER — HYDRALAZINE HCL 50 MG
5 TABLET ORAL ONCE
Refills: 0 | Status: COMPLETED | OUTPATIENT
Start: 2022-01-12 | End: 2022-01-12

## 2022-01-12 RX ORDER — LISINOPRIL 2.5 MG/1
20 TABLET ORAL DAILY
Refills: 0 | Status: DISCONTINUED | OUTPATIENT
Start: 2022-01-12 | End: 2022-01-13

## 2022-01-12 RX ORDER — SODIUM CHLORIDE 9 MG/ML
1000 INJECTION, SOLUTION INTRAVENOUS
Refills: 0 | Status: DISCONTINUED | OUTPATIENT
Start: 2022-01-12 | End: 2022-01-13

## 2022-01-12 RX ORDER — ENOXAPARIN SODIUM 100 MG/ML
40 INJECTION SUBCUTANEOUS
Refills: 0 | Status: DISCONTINUED | OUTPATIENT
Start: 2022-01-12 | End: 2022-01-13

## 2022-01-12 RX ORDER — GLUCAGON INJECTION, SOLUTION 0.5 MG/.1ML
1 INJECTION, SOLUTION SUBCUTANEOUS ONCE
Refills: 0 | Status: DISCONTINUED | OUTPATIENT
Start: 2022-01-12 | End: 2022-01-13

## 2022-01-12 RX ORDER — INSULIN LISPRO 100/ML
VIAL (ML) SUBCUTANEOUS AT BEDTIME
Refills: 0 | Status: DISCONTINUED | OUTPATIENT
Start: 2022-01-12 | End: 2022-01-13

## 2022-01-12 RX ORDER — MIRTAZAPINE 45 MG/1
7.5 TABLET, ORALLY DISINTEGRATING ORAL AT BEDTIME
Refills: 0 | Status: DISCONTINUED | OUTPATIENT
Start: 2022-01-12 | End: 2022-01-13

## 2022-01-12 RX ORDER — VANCOMYCIN HCL 1 G
1500 VIAL (EA) INTRAVENOUS EVERY 12 HOURS
Refills: 0 | Status: DISCONTINUED | OUTPATIENT
Start: 2022-01-12 | End: 2022-01-12

## 2022-01-12 RX ORDER — ACETAMINOPHEN 500 MG
650 TABLET ORAL EVERY 6 HOURS
Refills: 0 | Status: DISCONTINUED | OUTPATIENT
Start: 2022-01-12 | End: 2022-01-13

## 2022-01-12 RX ORDER — INSULIN LISPRO 100/ML
VIAL (ML) SUBCUTANEOUS
Refills: 0 | Status: DISCONTINUED | OUTPATIENT
Start: 2022-01-12 | End: 2022-01-13

## 2022-01-12 RX ORDER — LANOLIN ALCOHOL/MO/W.PET/CERES
6 CREAM (GRAM) TOPICAL AT BEDTIME
Refills: 0 | Status: DISCONTINUED | OUTPATIENT
Start: 2022-01-12 | End: 2022-01-13

## 2022-01-12 RX ORDER — HYDRALAZINE HCL 50 MG
25 TABLET ORAL THREE TIMES A DAY
Refills: 0 | Status: DISCONTINUED | OUTPATIENT
Start: 2022-01-12 | End: 2022-01-13

## 2022-01-12 RX ORDER — ACETAMINOPHEN 500 MG
2 TABLET ORAL
Qty: 0 | Refills: 0 | DISCHARGE

## 2022-01-12 RX ORDER — VANCOMYCIN HCL 1 G
2000 VIAL (EA) INTRAVENOUS EVERY 12 HOURS
Refills: 0 | Status: DISCONTINUED | OUTPATIENT
Start: 2022-01-12 | End: 2022-01-12

## 2022-01-12 RX ADMIN — ENOXAPARIN SODIUM 40 MILLIGRAM(S): 100 INJECTION SUBCUTANEOUS at 06:45

## 2022-01-12 RX ADMIN — Medication 5 MILLIGRAM(S): at 02:24

## 2022-01-12 RX ADMIN — ENOXAPARIN SODIUM 40 MILLIGRAM(S): 100 INJECTION SUBCUTANEOUS at 17:51

## 2022-01-12 RX ADMIN — PIPERACILLIN AND TAZOBACTAM 25 GRAM(S): 4; .5 INJECTION, POWDER, LYOPHILIZED, FOR SOLUTION INTRAVENOUS at 04:23

## 2022-01-12 RX ADMIN — Medication 25 MILLIGRAM(S): at 12:53

## 2022-01-12 RX ADMIN — LISINOPRIL 20 MILLIGRAM(S): 2.5 TABLET ORAL at 06:45

## 2022-01-12 RX ADMIN — PIPERACILLIN AND TAZOBACTAM 25 GRAM(S): 4; .5 INJECTION, POWDER, LYOPHILIZED, FOR SOLUTION INTRAVENOUS at 12:53

## 2022-01-12 RX ADMIN — Medication 25 MILLIGRAM(S): at 21:57

## 2022-01-12 RX ADMIN — MIRTAZAPINE 7.5 MILLIGRAM(S): 45 TABLET, ORALLY DISINTEGRATING ORAL at 21:57

## 2022-01-12 RX ADMIN — Medication 300 MILLIGRAM(S): at 09:43

## 2022-01-12 RX ADMIN — Medication 1 TABLET(S): at 18:33

## 2022-01-12 RX ADMIN — Medication 100 MILLIGRAM(S): at 17:51

## 2022-01-12 RX ADMIN — Medication 25 MILLIGRAM(S): at 06:45

## 2022-01-12 RX ADMIN — Medication 60 MILLIGRAM(S): at 17:51

## 2022-01-12 NOTE — H&P ADULT - NSHPPHYSICALEXAM_GEN_ALL_CORE
PHYSICAL EXAM:  GENERAL: No acute distress, obese, sitting in chair next to bed   HEAD:  Atraumatic, Normocephalic  EYES: EOMI, PERRLA, conjunctiva and sclera clear  NECK: Supple, no lymphadenopathy, no JVD  CHEST/LUNG: CTAB; No wheezes, rales, or rhonchi  HEART: RRR; No murmurs, rubs, or gallops  ABDOMEN: Soft, non-tender, non-distended; normal bowel sounds, no organomegaly  EXTREMITIES:  2+ peripheral pulses b/l, No clubbing, cyanosis, or edema  MUSCULOSKELETAL: (+) bandage over I&D site on left pointer finger. No erythema noted proximally to bandage  NEUROLOGY: AAO x 4, no focal deficits. CN II-XII intact. Reflexes and sensation present and intact.   SKIN: No rashes or lesions PHYSICAL EXAM:  GENERAL: No acute distress, obese, sitting in chair next to bed   HEAD:  Atraumatic, Normocephalic  EYES: EOMI, PERRLA, conjunctiva and sclera clear  NECK: Supple, no lymphadenopathy, no JVD  CHEST/LUNG: CTAB; No wheezes, rales, or rhonchi  HEART: RRR; No murmurs, rubs, or gallops  ABDOMEN: Soft, non-tender, non-distended; normal bowel sounds, no organomegaly  EXTREMITIES:  (+) b/l LE edema, non-pitting from feet to mid-shin. 2+ peripheral pulses b/l, No clubbing, cyanosis  MUSCULOSKELETAL: (+) bandage over I&D site on left pointer finger. No erythema noted proximally to bandage  NEUROLOGY: AAO x 4, no focal deficits. CN II-XII intact. Reflexes and sensation present and intact.   SKIN: No rashes or lesions PHYSICAL EXAM:  GENERAL: No acute distress, obese, sitting in chair next to bed   HEAD:  Atraumatic, Normocephalic  EYES: EOMI, PERRLA, conjunctiva and sclera clear  NECK: Supple, no lymphadenopathy, no JVD  CHEST/LUNG: CTAB; No wheezes, rales, or rhonchi  HEART: RRR; No murmurs, rubs, or gallops, nonpitting edema bilaterally  ABDOMEN: Soft, non-tender, non-distended; normal bowel sounds, no organomegaly  EXTREMITIES:  (+) b/l LE edema, non-pitting from feet to mid-shin. 2+ peripheral pulses b/l, No clubbing, cyanosis  MUSCULOSKELETAL: (+) bandage over I&D site on left pointer finger. Erythema and TTP of left index finger. Sensation intact, ROM somewhat limited  NEUROLOGY: AAO x 4, no focal deficits. CN II-XII intact. Reflexes and sensation present and intact.   SKIN: No rashes or lesions

## 2022-01-12 NOTE — CONSULT NOTE ADULT - ATTENDING COMMENTS
56 year old immunocompromised with CVID  with finger abscess after bite  S/o I and D  Culture without growth    Can change to levaquin 750 daily with doxycyline 100 q 12 for 7 more days.     Doubt OM this soon - would hold off on MRI    Can follow up as outpt with me [phone] 56 year old immunocompromised with CVID  with finger abscess after bite  S/o I and D  Culture without growth    Can change to Augmetin 875 po q 12 with doxycyline 100 q 12 for 7 more days.     Doubt OM this soon - would hold off on MRI    Can follow up as outpt with me [phone]

## 2022-01-12 NOTE — PROGRESS NOTE ADULT - SUBJECTIVE AND OBJECTIVE BOX
*****************************************  Riddhi Saeed MD I PGY1  Internal Medicine  Pager NS: [phone] I [facility]: [phone]  *****************************************      Patient is a 56y old  Male who presents with a chief complaint of left index finger abscess (12 Jan 2022 01:01)      SUBJECTIVE :      MEDICATIONS  (STANDING):  dextrose 40% Gel 15 Gram(s) Oral once  dextrose 5%. 1000 milliLiter(s) (50 mL/Hr) IV Continuous <Continuous>  dextrose 5%. 1000 milliLiter(s) (100 mL/Hr) IV Continuous <Continuous>  dextrose 50% Injectable 25 Gram(s) IV Push once  dextrose 50% Injectable 12.5 Gram(s) IV Push once  dextrose 50% Injectable 25 Gram(s) IV Push once  enoxaparin Injectable 40 milliGRAM(s) SubCutaneous two times a day  glucagon  Injectable 1 milliGRAM(s) IntraMuscular once  hydrALAZINE 25 milliGRAM(s) Oral three times a day  insulin lispro (ADMELOG) corrective regimen sliding scale   SubCutaneous three times a day before meals  insulin lispro (ADMELOG) corrective regimen sliding scale   SubCutaneous at bedtime  lisinopril 20 milliGRAM(s) Oral daily  piperacillin/tazobactam IVPB.. 3.375 Gram(s) IV Intermittent every 8 hours  vancomycin  IVPB 1500 milliGRAM(s) IV Intermittent every 12 hours    MEDICATIONS  (PRN):  acetaminophen     Tablet .. 650 milliGRAM(s) Oral every 6 hours PRN Temp greater or equal to 38C (100.4F), Mild Pain (1 - 3), Moderate Pain (4 - 6)  melatonin 6 milliGRAM(s) Oral at bedtime PRN Insomnia      CAPILLARY BLOOD GLUCOSE      POCT Blood Glucose.: 122 mg/dL (12 Jan 2022 07:37)    I&O's Summary      PHYSICAL EXAM:  Vital Signs Last 24 Hrs  T(C): 37.1 (12 Jan 2022 06:40), Max: 37.1 (12 Jan 2022 06:40)  T(F): 98.7 (12 Jan 2022 06:40), Max: 98.7 (12 Jan 2022 06:40)  HR: 87 (12 Jan 2022 06:40) (83 - 89)  BP: 170/88 (12 Jan 2022 06:40) (170/88 - 192/113)  BP(mean): --  RR: 19 (12 Jan 2022 06:40) (18 - 20)  SpO2: 96% (12 Jan 2022 06:40) (96% - 100%)    GENERAL: No acute distress, obese, sitting in chair next to bed   HEAD:  Atraumatic, Normocephalic  EYES: EOMI, PERRLA, conjunctiva and sclera clear  NECK: Supple, no lymphadenopathy, no JVD  CHEST/LUNG: CTAB; No wheezes, rales, or rhonchi  HEART: RRR; No murmurs, rubs, or gallops, nonpitting edema bilaterally  ABDOMEN: Soft, non-tender, non-distended; normal bowel sounds, no organomegaly  EXTREMITIES:  (+) b/l LE edema, non-pitting from feet to mid-shin. 2+ peripheral pulses b/l, No clubbing, cyanosis  MUSCULOSKELETAL: (+) bandage over I&D site on left pointer finger. Erythema and TTP of left index finger. Sensation intact, ROM somewhat limited  NEUROLOGY: AAO x 4, no focal deficits. CN II-XII intact. Reflexes and sensation present and intact.   SKIN: No rashes or lesions    LABS:                        14.1   8.29  )-----------( 215      ( 12 Jan 2022 07:49 )             44.0     01-11    136  |  95<L>  |  7   ----------------------------<  104<H>  4.1   |  31  |  0.71    Ca    9.5      11 Jan 2022 18:53    TPro  7.4  /  Alb  4.0  /  TBili  <0.2  /  DBili  x   /  AST  36  /  ALT  28  /  AlkPhos  156<H>  01-11                RADIOLOGY & ADDITIONAL TESTS:  Results Reviewed:   Imaging Personally Reviewed:  Electrocardiogram Personally Reviewed:    COORDINATION OF CARE:  Care Discussed with Consultants/Other Providers [Y/N]:  Prior or Outpatient Records Reviewed [Y/N]:   *****************************************  Riddhi Saeed MD I PGY1  Internal Medicine  Pager NS: [phone] I [facility]: [phone]  *****************************************      Patient is a 56y old  Male who presents with a chief complaint of left index finger abscess (12 Jan 2022 01:01)      SUBJECTIVE : NAEO. Pt seen and examined at bedside. Notes his finger pain has decreased significantly. Denies CP, SOB, fever/chills, dysuria, hematuria, diarrhea/constipation.       MEDICATIONS  (STANDING):  dextrose 40% Gel 15 Gram(s) Oral once  dextrose 5%. 1000 milliLiter(s) (50 mL/Hr) IV Continuous <Continuous>  dextrose 5%. 1000 milliLiter(s) (100 mL/Hr) IV Continuous <Continuous>  dextrose 50% Injectable 25 Gram(s) IV Push once  dextrose 50% Injectable 12.5 Gram(s) IV Push once  dextrose 50% Injectable 25 Gram(s) IV Push once  enoxaparin Injectable 40 milliGRAM(s) SubCutaneous two times a day  glucagon  Injectable 1 milliGRAM(s) IntraMuscular once  hydrALAZINE 25 milliGRAM(s) Oral three times a day  insulin lispro (ADMELOG) corrective regimen sliding scale   SubCutaneous three times a day before meals  insulin lispro (ADMELOG) corrective regimen sliding scale   SubCutaneous at bedtime  lisinopril 20 milliGRAM(s) Oral daily  piperacillin/tazobactam IVPB.. 3.375 Gram(s) IV Intermittent every 8 hours  vancomycin  IVPB 1500 milliGRAM(s) IV Intermittent every 12 hours    MEDICATIONS  (PRN):  acetaminophen     Tablet .. 650 milliGRAM(s) Oral every 6 hours PRN Temp greater or equal to 38C (100.4F), Mild Pain (1 - 3), Moderate Pain (4 - 6)  melatonin 6 milliGRAM(s) Oral at bedtime PRN Insomnia      CAPILLARY BLOOD GLUCOSE      POCT Blood Glucose.: 122 mg/dL (12 Jan 2022 07:37)    I&O's Summary      PHYSICAL EXAM:  Vital Signs Last 24 Hrs  T(C): 37.1 (12 Jan 2022 06:40), Max: 37.1 (12 Jan 2022 06:40)  T(F): 98.7 (12 Jan 2022 06:40), Max: 98.7 (12 Jan 2022 06:40)  HR: 87 (12 Jan 2022 06:40) (83 - 89)  BP: 170/88 (12 Jan 2022 06:40) (170/88 - 192/113)  BP(mean): --  RR: 19 (12 Jan 2022 06:40) (18 - 20)  SpO2: 96% (12 Jan 2022 06:40) (96% - 100%)    GENERAL: No acute distress, obese, no teeth   HEAD:  Atraumatic, Normocephalic  EYES: EOMI, conjunctiva and sclera clear  NECK: Supple, no lymphadenopathy, no JVD  CHEST/LUNG: CTAB; No wheezes, rales, or rhonchi  HEART: RRR; No murmurs, rubs, or gallops, 1+ pitting edema bilaterally  ABDOMEN: Soft, non-tender, non-distended; normal bowel sounds, no organomegaly  EXTREMITIES:  (+) b/l LE edema, 1+ pitting from feet to mid-shin. 2+ peripheral pulses b/l, No clubbing, cyanosis  MUSCULOSKELETAL: (+) bandage over I&D site on left pointer finger. Erythema and TTP of left index finger. Sensation intact, ROM somewhat limited  NEUROLOGY: AAO x 4, no focal deficits. CN II-XII intact. Reflexes and sensation present and intact.   SKIN: No rashes or lesions    LABS:                        14.1   8.29  )-----------( 215      ( 12 Jan 2022 07:49 )             44.0     01-11    136  |  95<L>  |  7   ----------------------------<  104<H>  4.1   |  31  |  0.71    Ca    9.5      11 Jan 2022 18:53    TPro  7.4  /  Alb  4.0  /  TBili  <0.2  /  DBili  x   /  AST  36  /  ALT  28  /  AlkPhos  156<H>  01-11                RADIOLOGY & ADDITIONAL TESTS:  Results Reviewed:   Imaging Personally Reviewed:  Electrocardiogram Personally Reviewed:    COORDINATION OF CARE:  Care Discussed with Consultants/Other Providers [Y/N]:  Prior or Outpatient Records Reviewed [Y/N]:

## 2022-01-12 NOTE — H&P ADULT - NSICDXPASTMEDICALHX_GEN_ALL_CORE_FT
PAST MEDICAL HISTORY:  Bipolar 1 disorder     Dental caries     DM (diabetes mellitus)     HTN (hypertension)     IgG deficiency     Smoker      PAST MEDICAL HISTORY:  Bipolar 1 disorder     Dental caries     DM (diabetes mellitus)     HTN (hypertension)     Hypertriglyceridemia     IgG deficiency     Smoker

## 2022-01-12 NOTE — DISCHARGE NOTE PROVIDER - NSDCCPCAREPLAN_GEN_ALL_CORE_FT
PRINCIPAL DISCHARGE DIAGNOSIS  Diagnosis: Abscess of index finger  Assessment and Plan of Treatment: You came in with an infection of the left index finger and due to persistent pain and worsening redness, we started you on antibiotics. On the x-ray there was concern for an infection of the bone however due to your clinical improvement and the timing of the injury/injection infection of the bone was very less likely. You are being discharged home on two antibiotics which you are to take for 5 more days. Please follow up with the infectious disease doctor in 7 days to ensure resolvement on the infection      SECONDARY DISCHARGE DIAGNOSES  Diagnosis: Bilateral leg edema  Assessment and Plan of Treatment: You were noted to have swelling in your legs which was present for months. There were no clots in the legs. Ultrasound of the heart showed that normal heart function. We believe the swelling is due to improper functioning of the valves of the vein leading to blood pooling in the legs. Please wear compression stockings and keep your legs elevated to allow for the vein to drain.    Diagnosis: Hypertensive urgency  Assessment and Plan of Treatment: You came in with really high blood pressures. You were started on lisinopril 40 and hydralazine 25 three times a day which you are to continue when you go home. Please follow with your primary care doctor to ensure proper management of your high blood pressure.

## 2022-01-12 NOTE — H&P ADULT - NSHPLABSRESULTS_GEN_ALL_CORE
CARDIOLOGY  EKG: NSR at 75 bpm. QTc 453    Labs:                        14.5   8.56  )-----------( 241      ( 11 Jan 2022 18:53 )             45.6     01-11    136  |  95<L>  |  7   ----------------------------<  104<H>  4.1   |  31  |  0.71    Ca    9.5      11 Jan 2022 18:53    TPro  7.4  /  Alb  4.0  /  TBili  <0.2  /  DBili  x   /  AST  36  /  ALT  28  /  AlkPhos  156<H>  01-11      COVID-19/Full RVP Panel:  NotDetec (01-11-22 @ 19:48)        RADIOLOGY  < from: Xray Hand 3 Views, Left (01.11.22 @ 19:18) >    IMPRESSION:  Nonspecific subtle cortical irregularity versus early periosteal reaction   at the radial margin of the distal second first phalanx is seen on the   frontal radiograph. Findings may be chronic in nature, however given   surrounding soft tissue swelling, consider MRI for further evaluation if   high clinical concern for osteomyelitis.        ******PRELIMINARY REPORT******      < end of copied text >    < from: Xray Chest 2 Views PA/Lat (01.11.22 @ 19:18) >    IMPRESSION:  Clear lungs.        ******PRELIMINARY REPORT******      < end of copied text > CARDIOLOGY  EKG personally reviewed and interpreted: NSR at 75 bpm, no ST depressions or elevations QTc 453    Labs:                        14.5   8.56  )-----------( 241      ( 11 Jan 2022 18:53 )             45.6     01-11    136  |  95<L>  |  7   ----------------------------<  104<H>  4.1   |  31  |  0.71    Ca    9.5      11 Jan 2022 18:53    TPro  7.4  /  Alb  4.0  /  TBili  <0.2  /  DBili  x   /  AST  36  /  ALT  28  /  AlkPhos  156<H>  01-11      COVID-19/Full RVP Panel:  Nikunj (01-11-22 @ 19:48)      Imaging reviewed by me:  RADIOLOGY  < from: Xray Hand 3 Views, Left (01.11.22 @ 19:18) >    IMPRESSION:  Nonspecific subtle cortical irregularity versus early periosteal reaction   at the radial margin of the distal second first phalanx is seen on the   frontal radiograph. Findings may be chronic in nature, however given   surrounding soft tissue swelling, consider MRI for further evaluation if   high clinical concern for osteomyelitis.        ******PRELIMINARY REPORT******      < end of copied text >    < from: Xray Chest 2 Views PA/Lat (01.11.22 @ 19:18) >    IMPRESSION:  Clear lungs.        ******PRELIMINARY REPORT******      < end of copied text >

## 2022-01-12 NOTE — PROGRESS NOTE ADULT - PROBLEM SELECTOR PLAN 6
- Previously documented that patient takes Haldol IM q4 weeks  - Reports also taking Remeron and Paxil but does not know dosages   - [facility] pharmacists emailed - Previously documented that patient takes Haldol IM q4 weeks  - Reports also taking Remeron and Paxil but does not know dosages   - f/u [facility] pharmacists

## 2022-01-12 NOTE — H&P ADULT - PROBLEM SELECTOR PLAN 8
- Diet: Consistent carb, DASH  - DVT prophylaxis: Lovenox 40 mg BID  - Activity: Activity as tolerated

## 2022-01-12 NOTE — CONSULT NOTE ADULT - SUBJECTIVE AND OBJECTIVE BOX
Patient is a 56y old  Male who presents with a chief complaint of left index finger abscess (12 Jan 2022 07:58)    HPI:  57 y/o male with PMH of HTN, DM, hypogammaglobulinemia, and schizoaffective disorder presented to the hospital ED for pain and redness in left pointer finger. Patient states that about 5 days ago he noticed a "pimple" on the tip of his left pointer finger which was painful so he used a needle to drain it. He says the pain at its worst was a 8/10 and currently is now a 5/10 achy feeling. He was prescribed Augmentin and took the medication for 3 days. Over the next couple of days the redness at the tip of his finger traveled proximally to the base of his finger and the "pimple" he popped became filled with pus. He had difficulty flexing his finger and it became painful to palpation again. He went back to his primary care doctor who then prompted him to come to the ED. Additionally, patient noticed swelling in b/l feet and legs for 3 weeks. He was told by PMD it might be related to amlodipine but patient has been on for a year. He also has ran out of amlodipine for last two weeks. Denies fever, chills, nausea, HA, difficulty moving other fingers or wrist, pain traveling up arm, or expanding erythema past his left pointer finger, CP, SOB, orthopnea, redness in legs, pain in calves.  In the ED patient was seen by plastic surgery, I&D'd, and bandaged. He had been given Tylenol 975 mg PO with minimal improvement.     Patient is unsure of his med dosing but states he is on remeron, paxil, haldol injections. Ran out of amlodipine (12 Jan 2022 01:01)    ID consult for left index finger abscess.    REVIEW OF SYSTEMS  [  ] ROS unobtainable because:    [  ] All other systems negative except as noted below    Constitutional:  [ ] fever [ ] chills  [ ] weight loss  [ ]night sweat  [ ]poor appetite/PO intake [ ]fatigue   Skin:  [ ] rash [ ] phlebitis	  Eyes: [ ] icterus [ ] pain  [ ] discharge	  ENMT: [ ] sore throat  [ ] thrush [ ] ulcers [ ] exudates [ ]anosmia  Respiratory: [ ] dyspnea [ ] hemoptysis [ ] cough [ ] sputum	  Cardiovascular:  [ ] chest pain [ ] palpitations [ ] edema	  Gastrointestinal:  [ ] nausea [ ] vomiting [ ] diarrhea [ ] constipation [ ] pain	  Genitourinary:  [ ] dysuria [ ] frequency [ ] hematuria [ ] discharge [ ] flank pain  [ ] incontinence  Musculoskeletal:  [ ] myalgias [ ] arthralgias [ ] arthritis  [ ] back pain  Neurological:  [ ] headache [ ] weakness [ ] seizures  [ ] confusion/altered mental status    prior hospital charts reviewed [V]  primary team notes reviewed [V]  other consultant notes reviewed [V]    PAST MEDICAL & SURGICAL HISTORY:  Bipolar 1 disorder  IgG deficiency  Smoker  DM (diabetes mellitus)  Dental caries  HTN (hypertension)  Hypertriglyceridemia  Deviated septum  Loss of teeth due to extraction All teeth due to dental carries      SOCIAL HISTORY:  - Smoker+    FAMILY HISTORY:  Family history of throat cancer (Father)  Family history of leukemia (Mother)    Allergies  amoxicillin (Fever)  penicillin (Rash)    ANTIMICROBIALS:  piperacillin/tazobactam IVPB.. 3.375 every 8 hours  vancomycin  IVPB 1500 every 12 hours    ANTIMICROBIALS (past 90 days):  MEDICATIONS  (STANDING):  piperacillin/tazobactam IVPB..   25 mL/Hr IV Intermittent (01-12-22 @ 12:53)   25 mL/Hr IV Intermittent (01-12-22 @ 04:23)    piperacillin/tazobactam IVPB...   200 mL/Hr IV Intermittent (01-11-22 @ 19:14)    vancomycin  IVPB   300 mL/Hr IV Intermittent (01-12-22 @ 09:43)    vancomycin  IVPB.   250 mL/Hr IV Intermittent (01-11-22 @ 21:13)      OTHER MEDS:   MEDICATIONS  (STANDING):  acetaminophen     Tablet .. 650 every 6 hours PRN  dextrose 40% Gel 15 once  dextrose 50% Injectable 25 once  dextrose 50% Injectable 12.5 once  dextrose 50% Injectable 25 once  enoxaparin Injectable 40 two times a day  glucagon  Injectable 1 once  hydrALAZINE 25 three times a day  insulin lispro (ADMELOG) corrective regimen sliding scale  three times a day before meals  insulin lispro (ADMELOG) corrective regimen sliding scale  at bedtime  lisinopril 20 daily  melatonin 6 at bedtime PRN      VITALS:  Vital Signs Last 24 Hrs  T(F): 97.6 (01-12-22 @ 08:45), Max: 98.7 (01-12-22 @ 06:40)    Vital Signs Last 24 Hrs  HR: 66 (01-12-22 @ 10:40) (66 - 89)  BP: 80/48 (01-12-22 @ 10:40) (80/48 - 192/113)  RR: 18 (01-12-22 @ 10:40)  SpO2: 100% (01-12-22 @ 10:40) (96% - 100%)  Wt(kg): --    EXAM:  Physical Exam:  Constitutional:  well preserved, comfortable  Head/Eyes: no icterus, PERRL, EOMI  ENT:  supple; no thrush  LUNGS:  CTA  CVS:  normal S1, S2, no murmur  Abd:  soft, non-tender; non-distended  Ext:  no edema  Vascular:  IV site no erythema tenderness or discharge  MSK:  joints without swelling  Neuro: AAO X 3, non- focal    Labs:                        14.1   8.29  )-----------( 215      ( 12 Jan 2022 07:49 )             44.0     01-12    135  |  96<L>  |  7   ----------------------------<  128<H>  3.5   |  26  |  0.68    Ca    8.9      12 Jan 2022 07:49  Phos  3.2     01-12  Mg     1.90     01-12    TPro  7.4  /  Alb  4.0  /  TBili  <0.2  /  DBili  x   /  AST  36  /  ALT  28  /  AlkPhos  156<H>  01-11      WBC Trend:  WBC Count: 8.29 (01-12-22 @ 07:49)  WBC Count: 8.56 (01-11-22 @ 18:53)    Auto Neutrophil #: 5.57 K/uL (01-11-22 @ 18:53)    Creatine Trend:  Creatinine, Serum: 0.68 (01-12)  Creatinine, Serum: 0.71 (01-11)    Liver Biochemical Testing Trend:  Alanine Aminotransferase (ALT/SGPT): 28 (01-11)  Aspartate Aminotransferase (AST/SGOT): 36 (01-11-22 @ 18:53)  Bilirubin Total, Serum: <0.2 (01-11)    MICROBIOLOGY:    COVID-19 PCR: NotDetec (01-11-22 @ 19:48)    Sedimentation Rate, Erythrocyte: 34 mm/hr (01.12.22 @ 07:49)  C-Reactive Protein, Serum: 29.4 (01-12)    Serum Pro-Brain Natriuretic Peptide: 45 (01-12)    A1C with Estimated Average Glucose Result: 6.4 % (01-12-22 @ 07:49)    RADIOLOGY:  imaging below personally reviewed    < from: Xray Hand 3 Views, Left (01.11.22 @ 19:18) >  IMPRESSION:  Diffuse soft tissue swelling of the index finger. No gross radiographic   evidence of osteomyelitis. MRI can be performed to more sensitively   evaluate for osteomyelitis, if this is a clinical concern.  No acute fracture or dislocation. No advanced arthritic changes.  < end of copied text >    < from: Xray Chest 2 Views PA/Lat (01.11.22 @ 19:18) >  IMPRESSION: No acute pulmonary disease.  < end of copied text >     Patient is a 56y old  Male who presents with a chief complaint of left index finger abscess (12 Jan 2022 07:58)    HPI:  57 y/o male with PMH of HTN, DM, hypogammaglobulinemia, and schizoaffective disorder presented to the hospital ED for pain and redness in left pointer finger. Patient states that about 5 days ago he noticed a "pimple" on the tip of his left pointer finger which was painful so he used a needle to drain it. He says the pain at its worst was a 8/10 and currently is now a 5/10 achy feeling. He was prescribed Augmentin and took the medication for 3 days. Over the next couple of days the redness at the tip of his finger traveled proximally to the base of his finger and the "pimple" he popped became filled with pus. He had difficulty flexing his finger and it became painful to palpation again. He went back to his primary care doctor who then prompted him to come to the ED. Additionally, patient noticed swelling in b/l feet and legs for 3 weeks. He was told by PMD it might be related to amlodipine but patient has been on for a year. He also has ran out of amlodipine for last two weeks. Denies fever, chills, nausea, HA, difficulty moving other fingers or wrist, pain traveling up arm, or expanding erythema past his left pointer finger, CP, SOB, orthopnea, redness in legs, pain in calves.  In the ED patient was seen by plastic surgery, I&D'd, and bandaged. He had been given Tylenol 975 mg PO with minimal improvement.     Patient is unsure of his med dosing but states he is on remeron, paxil, haldol injections. Ran out of amlodipine (12 Jan 2022 01:01)    ID consult for left index finger abscess.  It developed out of no-where 6-7 days ago, he did put it in warm water. No recent nail cut, finger bite or injury.  He started to take Augmentin 3 days after.  After taking 3 days of Augmentin, it's not improving so he was advised to come to ED.    He denies hx of infection. But he had a boil on his back that popped and resolved on its own once.  He had 2 doses of Moderna vaccine in 03/2021.     REVIEW OF SYSTEMS  [  ] ROS unobtainable because:    [ X ] All other systems negative except as noted below: everything else is negative    Constitutional:  [ ] fever [ ] chills  [ ] weight loss  [ ]night sweat  [ ]poor appetite/PO intake [ ]fatigue   Skin:  [ ] rash [ ] phlebitis	  Eyes: [ ] icterus [ ] pain  [ ] discharge	  ENMT: [ ] sore throat  [ ] thrush [ ] ulcers [ ] exudates [ ]anosmia  Respiratory: [ ] dyspnea [ ] hemoptysis [ ] cough [ ] sputum	  Cardiovascular:  [ ] chest pain [ ] palpitations [ ] edema	  Gastrointestinal:  [ ] nausea [ ] vomiting [ ] diarrhea [ ] constipation [ ] pain	  Genitourinary:  [ ] dysuria [ ] frequency [ ] hematuria [ ] discharge [ ] flank pain  [ ] incontinence  Musculoskeletal:  [ ] myalgias [ ] arthralgias [ ] arthritis  [ ] back pain  Neurological:  [ ] headache [ ] weakness [ ] seizures  [ ] confusion/altered mental status    prior hospital charts reviewed [V]  primary team notes reviewed [V]  other consultant notes reviewed [V]    PAST MEDICAL & SURGICAL HISTORY:  Bipolar 1 disorder  IgG deficiency  Smoker  DM (diabetes mellitus)  Dental caries  HTN (hypertension)  Hypertriglyceridemia  Deviated septum  Loss of teeth due to extraction All teeth due to dental carries      SOCIAL HISTORY:  - Smoker+ smoking 2 packs per day for 25 years  - No EtOH  - Used to do marijuana but not now  - Lives alone    FAMILY HISTORY:  Family history of throat cancer (Father)  Family history of leukemia (Mother)    Allergies  amoxicillin (Fever)  penicillin (Rash)    ANTIMICROBIALS:  piperacillin/tazobactam IVPB.. 3.375 every 8 hours  vancomycin  IVPB 1500 every 12 hours    ANTIMICROBIALS (past 90 days):  MEDICATIONS  (STANDING):  piperacillin/tazobactam IVPB..   25 mL/Hr IV Intermittent (01-12-22 @ 12:53)   25 mL/Hr IV Intermittent (01-12-22 @ 04:23)    piperacillin/tazobactam IVPB...   200 mL/Hr IV Intermittent (01-11-22 @ 19:14)    vancomycin  IVPB   300 mL/Hr IV Intermittent (01-12-22 @ 09:43)    vancomycin  IVPB.   250 mL/Hr IV Intermittent (01-11-22 @ 21:13)      OTHER MEDS:   MEDICATIONS  (STANDING):  acetaminophen     Tablet .. 650 every 6 hours PRN  dextrose 40% Gel 15 once  dextrose 50% Injectable 25 once  dextrose 50% Injectable 12.5 once  dextrose 50% Injectable 25 once  enoxaparin Injectable 40 two times a day  glucagon  Injectable 1 once  hydrALAZINE 25 three times a day  insulin lispro (ADMELOG) corrective regimen sliding scale  three times a day before meals  insulin lispro (ADMELOG) corrective regimen sliding scale  at bedtime  lisinopril 20 daily  melatonin 6 at bedtime PRN      VITALS:  Vital Signs Last 24 Hrs  T(F): 97.6 (01-12-22 @ 08:45), Max: 98.7 (01-12-22 @ 06:40)    Vital Signs Last 24 Hrs  HR: 66 (01-12-22 @ 10:40) (66 - 89)  BP: 80/48 (01-12-22 @ 10:40) (80/48 - 192/113)  RR: 18 (01-12-22 @ 10:40)  SpO2: 100% (01-12-22 @ 10:40) (96% - 100%)  Wt(kg): --    EXAM:  Physical Exam:  Constitutional:  well preserved, comfortable  Head/Eyes: no icterus, PERRL, EOMI  ENT:  supple; no thrush  LUNGS:  CTA  CVS:  normal S1, S2, no murmur  Abd:  soft, non-tender; non-distended  Ext:  no edema; left first finger redness and mild tenderness, in gauze  Vascular:  IV site no erythema tenderness or discharge  MSK:  joints without swelling  Neuro: AAO X 3, non- focal    Labs:                        14.1   8.29  )-----------( 215      ( 12 Jan 2022 07:49 )             44.0     01-12    135  |  96<L>  |  7   ----------------------------<  128<H>  3.5   |  26  |  0.68    Ca    8.9      12 Jan 2022 07:49  Phos  3.2     01-12  Mg     1.90     01-12    TPro  7.4  /  Alb  4.0  /  TBili  <0.2  /  DBili  x   /  AST  36  /  ALT  28  /  AlkPhos  156<H>  01-11      WBC Trend:  WBC Count: 8.29 (01-12-22 @ 07:49)  WBC Count: 8.56 (01-11-22 @ 18:53)    Auto Neutrophil #: 5.57 K/uL (01-11-22 @ 18:53)    Creatine Trend:  Creatinine, Serum: 0.68 (01-12)  Creatinine, Serum: 0.71 (01-11)    Liver Biochemical Testing Trend:  Alanine Aminotransferase (ALT/SGPT): 28 (01-11)  Aspartate Aminotransferase (AST/SGOT): 36 (01-11-22 @ 18:53)  Bilirubin Total, Serum: <0.2 (01-11)    MICROBIOLOGY:    COVID-19 PCR: NotDetec (01-11-22 @ 19:48)    Sedimentation Rate, Erythrocyte: 34 mm/hr (01.12.22 @ 07:49)  C-Reactive Protein, Serum: 29.4 (01-12)    Serum Pro-Brain Natriuretic Peptide: 45 (01-12)    A1C with Estimated Average Glucose Result: 6.4 % (01-12-22 @ 07:49)    RADIOLOGY:  imaging below personally reviewed    < from: Xray Hand 3 Views, Left (01.11.22 @ 19:18) >  IMPRESSION:  Diffuse soft tissue swelling of the index finger. No gross radiographic   evidence of osteomyelitis. MRI can be performed to more sensitively   evaluate for osteomyelitis, if this is a clinical concern.  No acute fracture or dislocation. No advanced arthritic changes.  < end of copied text >    < from: Xray Chest 2 Views PA/Lat (01.11.22 @ 19:18) >  IMPRESSION: No acute pulmonary disease.  < end of copied text >

## 2022-01-12 NOTE — PROGRESS NOTE ADULT - PROBLEM SELECTOR PLAN 2
SBPs 180-190s  - Unknown Home BP meds. States he previously took Amlodipine but ran out and his PCP told him to stop d/t LE edema  - f/u [facility] pharmacists   - Will start Lisinopril 20 mg QD and Hydralazine 25 mg TID   - monitor BP   - DASH/TLC diet SBPs 180-190s  - Unknown Home BP meds. States he previously took Amlodipine but ran out and his PCP told him to stop d/t LE edema  - f/u [facility] pharmacists   - c/w Lisinopril 20 mg QD and Hydralazine 25 mg TID   - monitor BP   - DASH/TLC diet

## 2022-01-12 NOTE — DISCHARGE NOTE PROVIDER - NSDCFUADDAPPT_GEN_ALL_CORE_FT
Please follow with your PCP, Dr. Burciaga for the management of your blood pressure.     Please follow with the infectious disease doctor in 1 week to ensure that the infection is resolving.     Please follow with your psychiatry team and make an appointment to get your next haldol injection on 02/10/22 (last dose given on 1/13).

## 2022-01-12 NOTE — H&P ADULT - PROBLEM SELECTOR PLAN 1
- Per ED documentation, Dr. Fernández performed I&D  - Hand xray showing possible osteomyelitis   - Dr. Fernández recommending ID c/s in AM for antibiotic management   - Received Vancomycin and Zosyn x1 in ED. Will continue for now  - MRI hand for r/o osteo pending  - ESR/CRP with AM labs - Discussed with ED providers and Dr. Fernández (plastics) performed I&D  - Hand xray showing possible osteomyelitis   - Dr. Fernández recommending ID c/s in AM for antibiotic management   - Received Vancomycin and Zosyn x1 in ED. Will continue for now. Check vanc trough before 4th dose  - MRI hand for r/o osteo pending  - ESR/CRP with AM labs

## 2022-01-12 NOTE — PATIENT PROFILE ADULT - FALL HARM RISK - RISK INTERVENTIONS
Assistance with ambulation/Communicate Fall Risk and Risk Factors to all staff, patient, and family/Monitor for mental status changes/Monitor gait and stability/Reinforce activity limits and safety measures with patient and family/Reorient to person, place and time as needed/Review medications for side effects contributing to fall risk/Sit up slowly, dangle for a short time, stand at bedside before walking/Toileting schedule using arm’s reach rule for commode and bathroom/Use of alarms - bed, chair and/or voice tab/Visual Cue: Yellow wristband/Bed in lowest position, wheels locked, appropriate side rails in place/Call bell, personal items and telephone in reach/Instruct patient to call for assistance before getting out of bed or chair/Non-slip footwear when patient is out of bed/[place] to call system/Physically safe environment - no spills, clutter or unnecessary equipment/Purposeful Proactive Rounding/Room/bathroom lighting operational, light cord in reach

## 2022-01-12 NOTE — DISCHARGE NOTE PROVIDER - CARE PROVIDER_API CALL
Justin Moncada)  Infectious Disease  [address] [place] [address]  Phone: [phone]  Fax: [phone]  Established Patient  Follow Up Time: 1 week    Saritha Burciaga  INTERNAL MEDICINE  [address] [place] [address], [place] [address]  Phone: [phone]  Fax: [phone]  Established Patient  Follow Up Time: 1 week

## 2022-01-12 NOTE — PROGRESS NOTE ADULT - PROBLEM SELECTOR PLAN 4
- Hold oral DM meds while inpatient   - A1C  - ISS  - monitor FS  - diabetic diet - Hold oral DM meds while inpatient   - A1C: 6.4  - c/w ISS  - monitor FS  - consistent carb diet

## 2022-01-12 NOTE — H&P ADULT - PROBLEM SELECTOR PLAN 7
- Patient unaware of any medication that he currently takes  - Says he fills Rx's at [facility]   - No current medications in EMR that is showing it was filled recently  - [facility] pharmacists emailed for assistance

## 2022-01-12 NOTE — PROGRESS NOTE ADULT - PROBLEM SELECTOR PLAN 7
- Patient unaware of any medication that he currently takes  - Says he fills Rx's at [facility]   - No current medications in EMR that is showing it was filled recently  - [facility] pharmacists emailed for assistance

## 2022-01-12 NOTE — H&P ADULT - PROBLEM SELECTOR PLAN 3
- Unknown Home BP meds. States he previously took Amlodipine but ran out and his PCP told him to stop d/t LE edema  - f/u [facility] pharmacists   - Will start Lisinopril 20 mg QD and Hydralazine 25 mg TID   - monitor BP   - DASH/TLC diet - No erythema noted, non-pitting  - TTE ordered  - b/l LE dopplers ordered   - BNP with AM labs

## 2022-01-12 NOTE — H&P ADULT - NSHPSOCIALHISTORY_GEN_ALL_CORE
Lives at home by himself  Occupation: disabled   Needs no assistance with ADLs  Denies drinking  Formally smoked marijuana   Currently smoked 2 ppd of cigarettes for 1 year. Smoked for 25 years total   Received 2 doses of Moderna Covid vaccine (last dose March 2021)

## 2022-01-12 NOTE — DISCHARGE NOTE PROVIDER - PROVIDER TOKENS
PROVIDER:[TOKEN:[[id number]],FOLLOWUP:[1 week],ESTABLISHEDPATIENT:[T]],PROVIDER:[TOKEN:[[id number]],FOLLOWUP:[1 week],ESTABLISHEDPATIENT:[T]]

## 2022-01-12 NOTE — CONSULT NOTE ADULT - ASSESSMENT
57 y/o male with PMH of HTN, DM, hypogammaglobulinemia, and schizoaffective disorder presented to the hospital ED for pain and redness in left pointer finger. Patient states that about 5 days ago he noticed a "pimple" on the tip of his left pointer finger which was painful so he used a needle to drain it. Took 3 days of Augmentin but getting worse. X-ray with diffuse swelling of the index finger, no gross OM. ID consult for left index finger abscess. Plastic surgery did I&D and took the culture.    #Left index finger abscess  - s/p I&D by plastic  - follow up pus culture and BCx  - MRI to look for OM  -     Prosper Aguilar MD, PGY5  ID fellow  Pager: [phone]  [facility] pager ID: [phone] (would prefer to text page for any new consult or question, please include name/location and best call back number)  After 5pm/weekends call [phone]   55 y/o male with PMH of HTN, DM, hypogammaglobulinemia, and schizoaffective disorder presented to the hospital ED for pain and redness in left pointer finger. Patient states that about 5 days ago he noticed a "pimple" on the tip of his left pointer finger which was painful so he used a needle to drain it. Took 3 days of Augmentin but getting worse. X-ray with diffuse swelling of the index finger, no gross OM. ID consult for left index finger abscess. Plastic surgery did I&D and took the culture.    #Left index finger abscess  - s/p I&D by plastic  - follow up pus culture and BCx  - No need MRI given the infection was less than a week and X-ray did not show gross OM, however given his immunocompromised state of receiving monthly IVIG, he should follow up with ID clinic to make sure abscess resolved  - Can change IV abx to PO doxycyline 100mg BID and PO levofloxacin 750mg daily for 7 days    Prosper Aguilar MD, PGY5  ID fellow  Pager: [phone]  [facility] pager ID: [phone] (would prefer to text page for any new consult or question, please include name/location and best call back number)  After 5pm/weekends call [phone]    d/w Dr. Coral Perez conveyed to primary team 55 y/o male with PMH of HTN, DM, hypogammaglobulinemia, and schizoaffective disorder presented to the hospital ED for pain and redness in left pointer finger. Patient states that about 5 days ago he noticed a "pimple" on the tip of his left pointer finger which was painful so he used a needle to drain it. Took 3 days of Augmentin but getting worse. X-ray with diffuse swelling of the index finger, no gross OM. ID consult for left index finger abscess. Plastic surgery did I&D and took the culture.    #Left index finger abscess  - s/p I&D by plastic  - follow up pus culture and BCx  - No need MRI given the infection was less than a week and X-ray did not show gross OM, however given his immunocompromised state of receiving monthly IVIG, he should follow up with ID clinic to make sure abscess resolved  - Can change IV abx to PO doxycyline 100mg BID and Augmetin 875 mg po q 12 for 7 days    Prosper Aguilar MD, PGY5  ID fellow  Pager: [phone]  [facility] pager ID: [phone] (would prefer to text page for any new consult or question, please include name/location and best call back number)  After 5pm/weekends call [phone]    d/w Dr. Coral Perez conveyed to primary team

## 2022-01-12 NOTE — H&P ADULT - PROBLEM SELECTOR PLAN 6
- Previously documented that patient takes Haldol IM q4 weeks  - [facility] pharmacists emailed - Previously documented that patient takes Haldol IM q4 weeks  - Reports also taking Remeron and Paxil but does not know dosages   - [facility] pharmacists emailed

## 2022-01-12 NOTE — PROGRESS NOTE ADULT - ASSESSMENT
55 y/o male with PMH of HTN, DM, hypogammaglobulinemia, and schizoaffective disorder presented to the hospital ED for pain and redness in left pointer finger. In the ED patient was seen by plastic surgery, I&D'd, and bandaged. Finger xray concerning for osteomyelitis. Additionally, patient noticed swelling in b/l feet and legs for 3 weeks. In the ED patient was seen by plastic surgery, I&D'd, and bandaged.  57 y/o male with PMH of HTN, DM, hypogammaglobulinemia, and schizoaffective disorder presented for L. index finger felon s/p I& D in the ED admitted due to c/f osteomyelitis

## 2022-01-12 NOTE — H&P ADULT - HISTORY OF PRESENT ILLNESS
57 y/o male with PMH of HTN, DM, hypogammaglobulinemia, and schizoaffective disorder presented to the hospital ED for pain and redness in left pointer finger. Patient states that about 5 days ago he noticed a "pimple" on the tip of his left pointer finger which was painful so he used a needle to drain it. He says the pain at its worst was a 8/10 and currently is now a 5/10 achy feeling. He was prescribed Augmentin and took the medication for 3 days. Over the next couple of days the redness at the tip of his finger traveled proximally to the base of his finger and the "pimple" he popped became filled with pus. He had difficulty flexing his finger and it became painful to palpation again. He went back to his primary care doctor who then prompted him to come to the ED. Denies fever, chills, nausea, HA, difficulty moving other fingers or wrist, pain traveling up arm, or expanding erythema past his left pointer finger.  In the ED patient was seen by plastic surgery, I&D'd, and bandaged. He had been given Tylenol 975 mg PO with minimal improvement.  55 y/o male with PMH of HTN, DM, hypogammaglobulinemia, and schizoaffective disorder presented to the hospital ED for pain and redness in left pointer finger. Patient states that about 5 days ago he noticed a "pimple" on the tip of his left pointer finger which was painful so he used a needle to drain it. He says the pain at its worst was a 8/10 and currently is now a 5/10 achy feeling. He was prescribed Augmentin and took the medication for 3 days. Over the next couple of days the redness at the tip of his finger traveled proximally to the base of his finger and the "pimple" he popped became filled with pus. He had difficulty flexing his finger and it became painful to palpation again. He went back to his primary care doctor who then prompted him to come to the ED. Additionally, patient noticed swelling in b/l feet and legs for 3 weeks. Denies fever, chills, nausea, HA, difficulty moving other fingers or wrist, pain traveling up arm, or expanding erythema past his left pointer finger, CP, SOB, orthopnea, redness in legs, pain in calves.  In the ED patient was seen by plastic surgery, I&D'd, and bandaged. He had been given Tylenol 975 mg PO with minimal improvement.  55 y/o male with PMH of HTN, DM, hypogammaglobulinemia, and schizoaffective disorder presented to the hospital ED for pain and redness in left pointer finger. Patient states that about 5 days ago he noticed a "pimple" on the tip of his left pointer finger which was painful so he used a needle to drain it. He says the pain at its worst was a 8/10 and currently is now a 5/10 achy feeling. He was prescribed Augmentin and took the medication for 3 days. Over the next couple of days the redness at the tip of his finger traveled proximally to the base of his finger and the "pimple" he popped became filled with pus. He had difficulty flexing his finger and it became painful to palpation again. He went back to his primary care doctor who then prompted him to come to the ED. Additionally, patient noticed swelling in b/l feet and legs for 3 weeks. He was told by PMD it might be related to amlodipine but patient has been on for a year. He also has ran out of amlodipine for last two weeks. Denies fever, chills, nausea, HA, difficulty moving other fingers or wrist, pain traveling up arm, or expanding erythema past his left pointer finger, CP, SOB, orthopnea, redness in legs, pain in calves.  In the ED patient was seen by plastic surgery, I&D'd, and bandaged. He had been given Tylenol 975 mg PO with minimal improvement.     Patient is unsure of his med dosing but states he is on remeron, paxil, haldol injections. Ran out of amlodipine

## 2022-01-12 NOTE — DISCHARGE NOTE PROVIDER - HOSPITAL COURSE
HPI:   57 y/o male with PMH of HTN, DM, hypogammaglobulinemia, and schizoaffective disorder presented to the hospital ED for pain and redness in left pointer finger. Patient states that about 5 days ago he noticed a "pimple" on the tip of his left pointer finger which was painful so he used a needle to drain it. He says the pain at its worst was a 8/10 and currently is now a 5/10 achy feeling. He was prescribed Augmentin and took the medication for 3 days. Over the next couple of days the redness at the tip of his finger traveled proximally to the base of his finger and the "pimple" he popped became filled with pus. He had difficulty flexing his finger and it became painful to palpation again. He went back to his primary care doctor who then prompted him to come to the ED. Additionally, patient noticed swelling in b/l feet and legs for 3 weeks. He was told by PMD it might be related to amlodipine but patient has been on for a year. He also has ran out of amlodipine for last two weeks. Denies fever, chills, nausea, HA, difficulty moving other fingers or wrist, pain traveling up arm, or expanding erythema past his left pointer finger, CP, SOB, orthopnea, redness in legs, pain in calves.  In the ED patient was seen by plastic surgery, I&D'd, and bandaged. He had been given Tylenol 975 mg PO with minimal improvement.     Hospital Course:  X-ray showed periosteal elevation of the L. index finger concerning for osteomyelitis. Pt was started on vanc and zosyn pending cultures and sensitivity from I&D. ID was consulted. MRI obtained revealed ______.     Due to persistent B/L LE edema, US duplex was obtained which was neg for any DVT's. pro-BNP was wnl. TTE was obtained which showed --- HPI:   57 y/o male with PMH of HTN, DM, hypogammaglobulinemia, and schizoaffective disorder presented to the hospital ED for pain and redness in left pointer finger. Patient states that about 5 days ago he noticed a "pimple" on the tip of his left pointer finger which was painful so he used a needle to drain it. He says the pain at its worst was a 8/10 and currently is now a 5/10 achy feeling. He was prescribed Augmentin and took the medication for 3 days. Over the next couple of days the redness at the tip of his finger traveled proximally to the base of his finger and the "pimple" he popped became filled with pus. He had difficulty flexing his finger and it became painful to palpation again. He went back to his primary care doctor who then prompted him to come to the ED. Additionally, patient noticed swelling in b/l feet and legs for 3 weeks. He was told by PMD it might be related to amlodipine but patient has been on for a year. He also has ran out of amlodipine for last two weeks. Denies fever, chills, nausea, HA, difficulty moving other fingers or wrist, pain traveling up arm, or expanding erythema past his left pointer finger, CP, SOB, orthopnea, redness in legs, pain in calves.  In the ED patient was seen by plastic surgery, I&D'd, and bandaged. He had been given Tylenol 975 mg PO with minimal improvement.     Hospital Course:  X-ray showed periosteal elevation of the L. index finger concerning for osteomyelitis. Pt was started on vanc and zosyn pending cultures and sensitivity from I&D. ID was consulted. MRI was deferred as the timing would make osteomyelitis less likely. Antibiotics were changed to augmentin and doxy for which is to be continued for 7 days.     Due to persistent B/L LE edema, US duplex was obtained which was neg for DVT's. pro-BNP was wnl. TTE was obtained which showed EF: 60% with normal LVSF. Etiology of the B/L LE edema is most likely chronic venous stasis; advised pt to use compression stockings and to keep his legs elevated.    Pt was persistently hypertensive although asymptomatic. [facility] revealed that pt had not been taking any anti-hypertensives medicines. Pt was started on lisinopril 40 and hydralazine 25 TID which is to be continued until next visit with the PMD.     Pt is HD stable and ready for discharge with close follow-up with primary care doctor, infectious disease and psychiatry services.

## 2022-01-12 NOTE — DISCHARGE NOTE PROVIDER - NSDCCPTREATMENT_GEN_ALL_CORE_FT
PRINCIPAL PROCEDURE  Procedure: Transthoracic echo  Findings and Treatment: EF: 60%  OBSERVATIONS:  Mitral Valve: Normal mitral valve.  Aortic Root: Normal aortic root.  Aortic Valve: Normal trileaflet aortic valve. Minimal  aortic regurgitation.  Left Atrium: Normal left atrium.  LA volume index = 26  cc/m2.  Left Ventricle: Normal left ventricular systolic function.  No segmental wall motion abnormalities. Normal left  ventricular internal dimensions and wall thicknesses.  Right Heart: Normal right atrium. Normal right ventricular  size and function. Normal tricuspid valve. Normal pulmonic  valve.  Pericardium/PleuraNormal pericardium with no pericardial  effusion.  ------------------------------------------------------------------------  CONCLUSIONS:  1. Normal trileaflet aortic valve. Minimal aortic  regurgitation.  2. Normal left ventricular internal dimensions and wall  thicknesses.  3. Normal left ventricular systolic function. No segmental  wall motion abnormalities.  4. Normal right ventricular size and function.

## 2022-01-12 NOTE — PROGRESS NOTE ADULT - PROBLEM SELECTOR PLAN 1
- Discussed with ED providers and Dr. Fernández (plastics) performed I&D  - Hand xray showing possible osteomyelitis   - Dr. Fernández recommending ID c/s in AM for antibiotic management   - Received Vancomycin and Zosyn x1 in ED. Will continue for now. Check vanc trough before 4th dose  - MRI hand for r/o osteo pending  - ESR/CRP with AM labs - s/p I&D by plastics in the ED   - Hand xray showing periosteal elevation concerning for possible osteomyelitis also with elevated ESR and CRP   - ID consulted   - c/w Vancomycin and Zosyn   - f/u vanc trough  - MRI hand for r/o osteo pending

## 2022-01-12 NOTE — DISCHARGE NOTE PROVIDER - NSDCFUSCHEDAPPT_GEN_ALL_CORE_FT
DAT WOOD ; 02/03/2022 ; [facility] [place] [facility] [facility]  DAT WOOD ; 03/03/2022 ; [facility] [place] [facility] [facility]  DAT WOOD ; 03/31/2022 ; [facility] [place] [facility] [facility]

## 2022-01-12 NOTE — H&P ADULT - ATTENDING COMMENTS
55 y/o male with PMH of HTN, DM, hypogammaglobulinemia, and schizoaffective disorder presented to the hospital ED for pain and redness in left pointer finger. In the ED patient was seen by plastic surgery, I&D'd, and bandaged. Finger xray concerning for possible osteomyelitis. Will continue with vanc/zosyn and obtain MRI left hand to r/o OM. ID consult in AM. For bilateral LE edema will obtain duplex, echo, probnp. May be side effect of amlodipine but reportedly has been on for a year and swelling happened more recently. No CP, SOB. Will need med rec for psych meds as patient does not know his dosages. 57 y/o male with PMH of HTN, DM, hypogammaglobulinemia, and schizoaffective disorder presented to the hospital ED for pain and redness in left pointer finger. In the ED patient was seen by plastic surgery, I&D'd, and bandaged. Finger xray concerning for possible osteomyelitis. Will continue with vanc/zosyn and obtain MRI left hand to r/o OM. ID consult in AM. For bilateral LE edema will obtain duplex, echo, probnp. May be side effect of amlodipine but reportedly has been on for a year and swelling happened more recently. No CP, SOB. Will need med rec for psych meds as patient does not know his dosages. For HTN, start lisinopril and hydralazine and uptitrate as necessary. Not symptomatic from elevated BP

## 2022-01-12 NOTE — H&P ADULT - NSHPREVIEWOFSYSTEMS_GEN_ALL_CORE
REVIEW OF SYSTEMS:  Constitutional Symptoms: No weakness, fevers, chills  Eyes: No visual changes, headache, eye pain, double vision  Ears, Nose, Mouth, Throat: No runny nose, sinus pain, ear pain, tinnitus, sore throat, dysphagia, odynophagia  Cardiovascular: No chest pain, palpitations, edema  Respiratory: No cough, wheezing, hemoptysis, shortness of breath  Gastrointestinal: No abdominal pain, dysphagia, anorexia, N/V/D/C, hematemesis, BRBPR, melena  Genitourinary: No dysuria, frequency, hematuria  Musculoskeletal: (+) joint pain, joint swelling, decreased ROM and strength in left pointer finger  Skin: (+) Erythema in left pointer finger. No pruritus  Neurologic:  No seizures, headache, paraesthesia, numbness, limb weakness  Psychiatric: No depression, anxiety, difficulty concentrating, anhedonia, lack of energy  Endocrine: No heat/cold intolerance, mood swings, sweats, polydipsia, polyuria  Hematologic/lymphatic: No purpura, petechia, prolonged or excessive bleeding after dental extraction / injury, use of anticoagulant and antiplatelet drugs (including aspirin)  Allergic/Immunologic: No anaphylaxis, allergic response to materials, foods, animals    Positives and pertinent negatives noted and all other systems negative. REVIEW OF SYSTEMS:  Constitutional Symptoms: No weakness, fevers, chills  Eyes: No visual changes, headache, eye pain, double vision  Ears, Nose, Mouth, Throat: No runny nose, sinus pain, ear pain, tinnitus, sore throat, dysphagia, odynophagia  Cardiovascular: (+) b/l LE edema. No chest pain, palpitations  Respiratory: No cough, wheezing, hemoptysis, shortness of breath  Gastrointestinal: No abdominal pain, dysphagia, anorexia, N/V/D/C, hematemesis, BRBPR, melena  Genitourinary: No dysuria, frequency, hematuria  Musculoskeletal: (+) joint pain, joint swelling, decreased ROM and strength in left pointer finger  Skin: (+) Erythema in left pointer finger. No pruritus  Neurologic:  No seizures, headache, paraesthesia, numbness, limb weakness  Psychiatric: No depression, anxiety, difficulty concentrating, anhedonia, lack of energy  Endocrine: No heat/cold intolerance, mood swings, sweats, polydipsia, polyuria  Hematologic/lymphatic: No purpura, petechia, prolonged or excessive bleeding after dental extraction / injury, use of anticoagulant and antiplatelet drugs (including aspirin)  Allergic/Immunologic: No anaphylaxis, allergic response to materials, foods, animals    Positives and pertinent negatives noted and all other systems negative.

## 2022-01-12 NOTE — H&P ADULT - ASSESSMENT
57 y/o male with PMH of HTN, DM, hypogammaglobulinemia, and schizoaffective disorder presented to the hospital ED for pain and redness in left pointer finger. In the ED patient was seen by plastic surgery, I&D'd, and bandaged. Finger xray concerning for osteomyelitis. Additionally, patient noticed swelling in b/l feet and legs for 3 weeks. In the ED patient was seen by plastic surgery, I&D'd, and bandaged.

## 2022-01-12 NOTE — H&P ADULT - PROBLEM SELECTOR PLAN 2
- No erythema noted, non-pitting  - TTE ordered  - BNP with AM labs - No erythema noted, non-pitting  - TTE ordered  - b/l LE dopplers ordered   - BNP with AM labs SBPs 180-190s  - Unknown Home BP meds. States he previously took Amlodipine but ran out and his PCP told him to stop d/t LE edema  - f/u [facility] pharmacists   - Will start Lisinopril 20 mg QD and Hydralazine 25 mg TID   - monitor BP   - DASH/TLC diet

## 2022-01-12 NOTE — DISCHARGE NOTE PROVIDER - NSDCMRMEDTOKEN_GEN_ALL_CORE_FT
Haldol Decanoate 100 mg/mL intramuscular solution: 150 milligram(s) intramuscularly every 4 weeks (last dispensed Nov/2021)  melatonin 3 mg oral tablet: 1 tab(s) orally once a day (at bedtime)  mirtazapine 15 mg oral tablet: 0.5 tab(s) orally once a day (at bedtime) (last dispensed in 12/2021)  PARoxetine 30 mg oral tablet: 2 tab(s) orally once a day (in the morning) (last dispensed 12/2021).    amoxicillin-clavulanate 875 mg-125 mg oral tablet: 1 tab(s) orally 2 times a day  doxycycline monohydrate 100 mg oral capsule: 1 cap(s) orally every 12 hours  Haldol Decanoate 100 mg/mL intramuscular solution: 150 milligram(s) intramuscularly every 4 weeks (last dispensed Nov/2021)  hydrALAZINE 25 mg oral tablet: 1 tab(s) orally 3 times a day  lisinopril 40 mg oral tablet: 1 tab(s) orally once a day  melatonin 3 mg oral tablet: 1 tab(s) orally once a day (at bedtime)  mirtazapine 15 mg oral tablet: 0.5 tab(s) orally once a day (at bedtime) (last dispensed in 12/2021)  PARoxetine 30 mg oral tablet: 2 tab(s) orally once a day (in the morning) (last dispensed 12/2021).

## 2022-01-12 NOTE — PROGRESS NOTE ADULT - PROBLEM SELECTOR PLAN 3
- No erythema noted, non-pitting  - TTE ordered  - b/l LE dopplers ordered   - BNP with AM labs - No erythema noted, non-pitting  - TTE ordered  - b/l LE dopplers: neg for DVT   - BNP: wnl

## 2022-01-13 ENCOUNTER — TRANSCRIPTION ENCOUNTER (OUTPATIENT)
Age: 57
End: 2022-01-13

## 2022-01-13 VITALS — SYSTOLIC BLOOD PRESSURE: 150 MMHG | DIASTOLIC BLOOD PRESSURE: 90 MMHG | HEART RATE: 94 BPM

## 2022-01-13 DIAGNOSIS — L03.012 CELLULITIS OF LEFT FINGER: ICD-10-CM

## 2022-01-13 LAB
ANION GAP SERPL CALC-SCNC: 14 MMOL/L — SIGNIFICANT CHANGE UP (ref 7–14)
BUN SERPL-MCNC: 11 MG/DL — SIGNIFICANT CHANGE UP (ref 7–23)
CALCIUM SERPL-MCNC: 9.3 MG/DL — SIGNIFICANT CHANGE UP (ref 8.4–10.5)
CHLORIDE SERPL-SCNC: 97 MMOL/L — LOW (ref 98–107)
CO2 SERPL-SCNC: 27 MMOL/L — SIGNIFICANT CHANGE UP (ref 22–31)
CREAT SERPL-MCNC: 0.7 MG/DL — SIGNIFICANT CHANGE UP (ref 0.5–1.3)
GLUCOSE BLDC GLUCOMTR-MCNC: 107 MG/DL — HIGH (ref 70–99)
GLUCOSE BLDC GLUCOMTR-MCNC: 111 MG/DL — HIGH (ref 70–99)
GLUCOSE SERPL-MCNC: 98 MG/DL — SIGNIFICANT CHANGE UP (ref 70–99)
HCT VFR BLD CALC: 43.8 % — SIGNIFICANT CHANGE UP (ref 39–50)
HGB BLD-MCNC: 14.1 G/DL — SIGNIFICANT CHANGE UP (ref 13–17)
MAGNESIUM SERPL-MCNC: 2.3 MG/DL — SIGNIFICANT CHANGE UP (ref 1.6–2.6)
MCHC RBC-ENTMCNC: 27.3 PG — SIGNIFICANT CHANGE UP (ref 27–34)
MCHC RBC-ENTMCNC: 32.2 GM/DL — SIGNIFICANT CHANGE UP (ref 32–36)
MCV RBC AUTO: 84.7 FL — SIGNIFICANT CHANGE UP (ref 80–100)
NRBC # BLD: 0 /100 WBCS — SIGNIFICANT CHANGE UP
NRBC # FLD: 0 K/UL — SIGNIFICANT CHANGE UP
PHOSPHATE SERPL-MCNC: 3.7 MG/DL — SIGNIFICANT CHANGE UP (ref 2.5–4.5)
PLATELET # BLD AUTO: 219 K/UL — SIGNIFICANT CHANGE UP (ref 150–400)
POTASSIUM SERPL-MCNC: 4 MMOL/L — SIGNIFICANT CHANGE UP (ref 3.5–5.3)
POTASSIUM SERPL-SCNC: 4 MMOL/L — SIGNIFICANT CHANGE UP (ref 3.5–5.3)
RBC # BLD: 5.17 M/UL — SIGNIFICANT CHANGE UP (ref 4.2–5.8)
RBC # FLD: 15.1 % — HIGH (ref 10.3–14.5)
SODIUM SERPL-SCNC: 138 MMOL/L — SIGNIFICANT CHANGE UP (ref 135–145)
WBC # BLD: 6.09 K/UL — SIGNIFICANT CHANGE UP (ref 3.8–10.5)
WBC # FLD AUTO: 6.09 K/UL — SIGNIFICANT CHANGE UP (ref 3.8–10.5)

## 2022-01-13 PROCEDURE — 99239 HOSP IP/OBS DSCHRG MGMT >30: CPT

## 2022-01-13 PROCEDURE — 90792 PSYCH DIAG EVAL W/MED SRVCS: CPT

## 2022-01-13 RX ORDER — LISINOPRIL 2.5 MG/1
1 TABLET ORAL
Qty: 30 | Refills: 0
Start: 2022-01-13 | End: 2022-02-11

## 2022-01-13 RX ORDER — LISINOPRIL 2.5 MG/1
40 TABLET ORAL DAILY
Refills: 0 | Status: DISCONTINUED | OUTPATIENT
Start: 2022-01-13 | End: 2022-01-13

## 2022-01-13 RX ORDER — HALOPERIDOL DECANOATE 100 MG/ML
150 INJECTION INTRAMUSCULAR ONCE
Refills: 0 | Status: COMPLETED | OUTPATIENT
Start: 2022-01-13 | End: 2022-01-13

## 2022-01-13 RX ORDER — HYDRALAZINE HCL 50 MG
1 TABLET ORAL
Qty: 90 | Refills: 0
Start: 2022-01-13 | End: 2022-02-11

## 2022-01-13 RX ORDER — LISINOPRIL 2.5 MG/1
1 TABLET ORAL
Qty: 30 | Refills: 0
Start: 2022-01-13

## 2022-01-13 RX ORDER — LISINOPRIL 2.5 MG/1
1 TABLET ORAL
Qty: 0 | Refills: 0 | DISCHARGE
Start: 2022-01-13

## 2022-01-13 RX ADMIN — ENOXAPARIN SODIUM 40 MILLIGRAM(S): 100 INJECTION SUBCUTANEOUS at 17:12

## 2022-01-13 RX ADMIN — HALOPERIDOL DECANOATE 150 MILLIGRAM(S): 100 INJECTION INTRAMUSCULAR at 17:13

## 2022-01-13 RX ADMIN — Medication 25 MILLIGRAM(S): at 14:02

## 2022-01-13 RX ADMIN — Medication 1 TABLET(S): at 17:12

## 2022-01-13 RX ADMIN — Medication 60 MILLIGRAM(S): at 14:02

## 2022-01-13 RX ADMIN — ENOXAPARIN SODIUM 40 MILLIGRAM(S): 100 INJECTION SUBCUTANEOUS at 05:10

## 2022-01-13 RX ADMIN — Medication 100 MILLIGRAM(S): at 17:12

## 2022-01-13 RX ADMIN — Medication 100 MILLIGRAM(S): at 05:09

## 2022-01-13 RX ADMIN — Medication 25 MILLIGRAM(S): at 05:09

## 2022-01-13 RX ADMIN — LISINOPRIL 20 MILLIGRAM(S): 2.5 TABLET ORAL at 05:09

## 2022-01-13 RX ADMIN — Medication 1 TABLET(S): at 05:09

## 2022-01-13 NOTE — PROVIDER CONTACT NOTE (OTHER) - ASSESSMENT
Patient is A&O times 4. No acute stress noted. Patient denied headache or dizziness. Vital Signs: BP: 173/93 (SBP>160), HR; 81, Temp: 99, SpO2:98%, and RR:17.

## 2022-01-13 NOTE — PROVIDER CONTACT NOTE (OTHER) - BACKGROUND
Patient admitted for osteomyelitis with pmh of HTN, DM, and Bipolar 1 disorder.
Patient admitted for finger abscess/infection

## 2022-01-13 NOTE — PROGRESS NOTE ADULT - PROBLEM SELECTOR PLAN 8
- Diet: Consistent carb, DASH  - DVT prophylaxis: Lovenox 40 mg BID  - Activity: Activity as tolerated
- Diet: Consistent carb, DASH  - DVT prophylaxis: Lovenox 40 mg BID  - Activity: Activity as tolerated

## 2022-01-13 NOTE — BH CONSULTATION LIAISON ASSESSMENT NOTE - CASE SUMMARY
Met with the patient. Lying comfortably in bed, wearing street clothes, hopes to be discharged today. Denies any mood symptoms, denies any si or hi, denies any a/vh or paranoia when asked. Keen on getting his haldol BELLA before being discharged.   Continue plan as above.   Coordinated care with Dr Cook today-- ok with giving scheduled haldol BELLA today, and will coordinate with patient about his next appt.

## 2022-01-13 NOTE — PROGRESS NOTE ADULT - PROBLEM SELECTOR PLAN 6
- Previously documented that patient takes Haldol IM q4 weeks  - Reports also taking Remeron and Paxil but does not know dosages   - f/u [facility] pharmacists - Previously documented that patient takes Haldol IM q4 weeks  - Reports also taking Remeron and Paxil but does not know dosages   - psych consulted for recs regarding haldol

## 2022-01-13 NOTE — BH CONSULTATION LIAISON ASSESSMENT NOTE - HPI (INCLUDE ILLNESS QUALITY, SEVERITY, DURATION, TIMING, CONTEXT, MODIFYING FACTORS, ASSOCIATED SIGNS AND SYMPTOMS)
Garrett Cristina is a 56yoM domiciled in supportive housing, on disability, PMH HTN, DM, hypogammaglobulinemia, PPHx schizoaffective d/o, hx of multiple psych hospitalizations (last known in 2020 at [facility]), denies hx SA/NSSIB, denies drug or alcohol use, denies legal hx, who presented with L finger abscess, s/p I&D. Psychiatry consulted for medication management.    On interview, patient states his mood is "excellent" and that he has had "excellent" treatment at [facility] so far. He states that his finger is getting better. He denies AH/VH or any paranoia. He states that sometimes he feels people can read his mind, but this has not happened since he has been hospitalized. He denies SIIP/HIIP or any hx of SA/NSSIB. He denies any trouble w/ sleep or appetite. He reports he has been compliant with his oral medications, but he missed his December Haldol injection as he was moving into a new apartment. He states that he "graduated" to supportive housing and now lives alone, which he enjoys. He has been following w/ Dr. Cook for several years, he is not sure when his next appt is but has it written down a calendar at home. He states that his last hospitalization was "a long time ago" (10/2020 - 11/2020 for SI per chart review). He currently smokes cigarettes 2ppd and is trying cut down. He does not drink alcohol or use drugs. He denies any family hx of mental illness. He gives verbal consent for writer to contact his sister Brittany ([phone]) who he talks to everyday. Patient states he would like to receive Haldol injection before discharge, understands that he would need next injection in February.    Reached out to outpatient psychiatrist Dr. Hollie Cook, awaiting response. Patient's last appt was 12/15, per documentation patient was euthymic w/ chronic mild paranoia at the time. Pateint currently taking Paxil 60mg PO daily, Remeron 7.5mg qHS, and Wellbutrin XL 300mg daily (dose inc on 12/15). He also receives Haldol Decanoate 150mg IM qmonthly, last given 11/24, next due 12/22 but patient missed appt.  Garrett Cristina is a 56yoM domiciled in supportive housing, on disability, PMH HTN, DM, hypogammaglobulinemia, PPHx schizoaffective d/o, hx of multiple psych hospitalizations (last known in 2020 at [facility]), denies hx SA/NSSIB, denies drug or alcohol use, denies legal hx, who presented with L finger abscess, s/p I&D. Psychiatry consulted for medication management.    On interview, patient states his mood is "excellent" and that he has had "excellent" treatment at [facility] so far. He states that his finger is getting better. He denies AH/VH or any paranoia. He states that sometimes he feels people can read his mind, but this has not happened since he has been hospitalized. He denies SIIP/HIIP or any hx of SA/NSSIB. He denies any trouble w/ sleep or appetite. He reports he has been compliant with his oral medications, but he missed his December Haldol injection as he was moving into a new apartment. He states that he "graduated" to supportive housing and now lives alone, which he enjoys. He has been following w/ Dr. Cook for several years, he is not sure when his next appt is but has it written down a calendar at home. He states that his last hospitalization was "a long time ago" (10/2020 - 11/2020 for SI per chart review). He currently smokes cigarettes 2ppd and is trying cut down. He does not drink alcohol or use drugs. He denies any family hx of mental illness. He gives verbal consent for writer to contact his sister Brittany ([phone]) who he talks to everyday. Patient states he would like to receive Haldol injection before discharge, understands that he would need next injection in February.    Left  for patient's sister Brittany ([phone]).    Reached out to outpatient psychiatrist Dr. Hollie Cook, awaiting response. Patient's last appt was 12/15, per documentation patient was euthymic w/ chronic mild paranoia at the time. Pateint currently taking Paxil 60mg PO daily, Remeron 7.5mg qHS, and Wellbutrin XL 300mg daily (dose inc on 12/15). He also receives Haldol Decanoate 150mg IM qmonthly, last given 11/24, next due 12/22 but patient missed appt.  Garrett Cristina is a 56yoM domiciled in supportive housing, on disability, PMH HTN, DM, hypogammaglobulinemia, PPHx schizoaffective d/o, hx of multiple psych hospitalizations (last known in 2020 at [facility]), denies hx SA/NSSIB, denies drug or alcohol use, denies legal hx, who presented with L finger abscess, s/p I&D. Psychiatry consulted for medication management.    On interview, patient states his mood is "excellent" and that he has had "excellent" treatment at [facility] so far. He states that his finger is getting better. He denies AH/VH or any paranoia. He states that sometimes he feels people can read his mind, but this has not happened since he has been hospitalized. He denies SIIP/HIIP or any hx of SA/NSSIB. He denies any trouble w/ sleep or appetite. He reports he has been compliant with his oral medications, but he missed his December Haldol injection as he was moving into a new apartment. He states that he "graduated" to supportive housing and now lives alone, which he enjoys. He has been following w/ Dr. Cook for several years, he is not sure when his next appt is but has it written down a calendar at home. He states that his last hospitalization was "a long time ago" (10/2020 - 11/2020 for SI per chart review). He currently smokes cigarettes 2ppd and is trying cut down. He does not drink alcohol or use drugs. He denies any family hx of mental illness. He gives verbal consent for writer to contact his sister Brittany ([phone]) who he talks to everyday. Patient states he would like to receive Haldol injection before discharge, understands that he would need next injection in February.    Left  for patient's sister Brittany ([phone]).    Reached out to outpatient psychiatrist Dr. Hollie Cook, who is in agreement with giving his BELLA before [facility] d/c and will coordinate next appt with patient. Patient's last appt was 12/15, per documentation patient was euthymic w/ chronic mild paranoia at the time. Patient currently taking Paxil 60mg PO daily, Remeron 7.5mg qHS, and Wellbutrin XL 300mg daily (dose inc on 12/15). He also receives Haldol Decanoate 150mg IM qmonthly, last given 11/24, next due 12/22 but patient missed appt.

## 2022-01-13 NOTE — PROGRESS NOTE ADULT - PROBLEM SELECTOR PLAN 1
- s/p I&D by plastics in the ED   - Hand xray showing periosteal elevation concerning for possible osteomyelitis also with elevated ESR and CRP   - ID consulted   - c/w Vancomycin and Zosyn   - f/u vanc trough  - MRI hand for r/o osteo pending - s/p I&D by plastics in the ED   - Hand xray showing periosteal elevation concerning for possible osteomyelitis also with elevated ESR and CRP   - ID consulted   - c/w doxy and augmentin (1/12- 1/19)x7 days   - MRI hand cancelled as there is low likelihood for OM

## 2022-01-13 NOTE — BH CONSULTATION LIAISON ASSESSMENT NOTE - NSBHCHARTREVIEWLAB_PSY_A_CORE FT
CBC Full  -  ( 13 Jan 2022 07:27 )  WBC Count : 6.09 K/uL  RBC Count : 5.17 M/uL  Hemoglobin : 14.1 g/dL  Hematocrit : 43.8 %  Platelet Count - Automated : 219 K/uL  Mean Cell Volume : 84.7 fL  Mean Cell Hemoglobin : 27.3 pg  Mean Cell Hemoglobin Concentration : 32.2 gm/dL  Auto Neutrophil # : x  Auto Lymphocyte # : x  Auto Monocyte # : x  Auto Eosinophil # : x  Auto Basophil # : x  Auto Neutrophil % : x  Auto Lymphocyte % : x  Auto Monocyte % : x  Auto Eosinophil % : x  Auto Basophil % : x  01-13    138  |  97<L>  |  11  ----------------------------<  98  4.0   |  27  |  0.70    Ca    9.3      13 Jan 2022 07:27  Phos  3.7     01-13  Mg     2.30     01-13    TPro  7.4  /  Alb  4.0  /  TBili  <0.2  /  DBili  x   /  AST  36  /  ALT  28  /  AlkPhos  156<H>  01-11

## 2022-01-13 NOTE — DISCHARGE NOTE NURSING/CASE MANAGEMENT/SOCIAL WORK - NSDCPEFALRISK_GEN_ALL_CORE
For information on Fall & Injury Prevention, visit: [email or web address] OR  [email or web address] OR  https://www.cdc.gov/steadi/patient.html

## 2022-01-13 NOTE — PROGRESS NOTE ADULT - ASSESSMENT
57 y/o male with PMH of HTN, DM, hypogammaglobulinemia, and schizoaffective disorder presented for L. index finger felon s/p I& D in the ED admitted due to c/f osteomyelitis

## 2022-01-13 NOTE — BH CONSULTATION LIAISON ASSESSMENT NOTE - MSE UNSTRUCTURED FT
The patient appears stated age, fair hygiene, lying in bed w/ under blankets w/ no clothes. Poor dentition. The patient was calm, cooperative with the interview and maintained appropriate eye contact. Somewhat childlike. No psychomotor agitation or retardation noted. The patient's speech was fluent, normal in tone, rate, and volume.  The patient's mood is "excellent".  Affect is euthymic, consistent w/ mood. The patient's thoughts are linear, goal directed. No apparent delusions. Denies hallucinations. Denies any suicidal or homicidal ideation, intent, or plan.  Insight is fair.  Judgment is fair.  Impulse control has been fair on the unit. A&Ox3.

## 2022-01-13 NOTE — PROGRESS NOTE ADULT - SUBJECTIVE AND OBJECTIVE BOX
*****************************************  Riddhi Saeed MD I PGY1  Internal Medicine  Pager NS: [phone] I [facility]: [phone]  *****************************************      Patient is a 56y old  Male who presents with a chief complaint of left index finger abscess (12 Jan 2022 14:23)      SUBJECTIVE :      MEDICATIONS  (STANDING):  amoxicillin  875 milliGRAM(s)/clavulanate 1 Tablet(s) Oral two times a day  dextrose 40% Gel 15 Gram(s) Oral once  dextrose 5%. 1000 milliLiter(s) (50 mL/Hr) IV Continuous <Continuous>  dextrose 5%. 1000 milliLiter(s) (100 mL/Hr) IV Continuous <Continuous>  dextrose 50% Injectable 25 Gram(s) IV Push once  dextrose 50% Injectable 12.5 Gram(s) IV Push once  dextrose 50% Injectable 25 Gram(s) IV Push once  doxycycline hyclate Capsule 100 milliGRAM(s) Oral every 12 hours  enoxaparin Injectable 40 milliGRAM(s) SubCutaneous two times a day  glucagon  Injectable 1 milliGRAM(s) IntraMuscular once  hydrALAZINE 25 milliGRAM(s) Oral three times a day  insulin lispro (ADMELOG) corrective regimen sliding scale   SubCutaneous three times a day before meals  insulin lispro (ADMELOG) corrective regimen sliding scale   SubCutaneous at bedtime  lisinopril 20 milliGRAM(s) Oral daily  mirtazapine 7.5 milliGRAM(s) Oral at bedtime  PARoxetine 60 milliGRAM(s) Oral daily    MEDICATIONS  (PRN):  acetaminophen     Tablet .. 650 milliGRAM(s) Oral every 6 hours PRN Temp greater or equal to 38C (100.4F), Mild Pain (1 - 3), Moderate Pain (4 - 6)  melatonin 6 milliGRAM(s) Oral at bedtime PRN Insomnia      CAPILLARY BLOOD GLUCOSE      POCT Blood Glucose.: 138 mg/dL (12 Jan 2022 21:53)  POCT Blood Glucose.: 106 mg/dL (12 Jan 2022 17:51)  POCT Blood Glucose.: 81 mg/dL (12 Jan 2022 12:24)  POCT Blood Glucose.: 246 mg/dL (12 Jan 2022 08:55)  POCT Blood Glucose.: 122 mg/dL (12 Jan 2022 07:37)    I&O's Summary      PHYSICAL EXAM:  Vital Signs Last 24 Hrs  T(C): 36.6 (13 Jan 2022 05:07), Max: 37.2 (12 Jan 2022 21:36)  T(F): 97.8 (13 Jan 2022 05:07), Max: 99 (12 Jan 2022 21:36)  HR: 83 (13 Jan 2022 05:07) (76 - 96)  BP: 158/82 (13 Jan 2022 05:07) (128/70 - 173/93)  BP(mean): --  RR: 18 (13 Jan 2022 05:07) (17 - 19)  SpO2: 94% (13 Jan 2022 05:07) (93% - 98%)    GENERAL: No acute distress, obese, no teeth   HEAD:  Atraumatic, Normocephalic  EYES: EOMI, conjunctiva and sclera clear  NECK: Supple, no lymphadenopathy, no JVD  CHEST/LUNG: CTAB; No wheezes, rales, or rhonchi  HEART: RRR; No murmurs, rubs, or gallops, 1+ pitting edema bilaterally  ABDOMEN: Soft, non-tender, non-distended; normal bowel sounds, no organomegaly  EXTREMITIES:  (+) b/l LE edema, 1+ pitting from feet to mid-shin. 2+ peripheral pulses b/l, No clubbing, cyanosis  MUSCULOSKELETAL: (+) bandage over I&D site on left index finger. Erythema and TTP of left index finger. Sensation intact, ROM somewhat limited  NEUROLOGY: AAO x 4, no focal deficits. CN II-XII intact. Reflexes and sensation present and intact.   SKIN: No rashes or lesions  LABS:                        14.1   8.29  )-----------( 215      ( 12 Jan 2022 07:49 )             44.0     01-12    135  |  96<L>  |  7   ----------------------------<  128<H>  3.5   |  26  |  0.68    Ca    8.9      12 Jan 2022 07:49  Phos  3.2     01-12  Mg     1.90     01-12    TPro  7.4  /  Alb  4.0  /  TBili  <0.2  /  DBili  x   /  AST  36  /  ALT  28  /  AlkPhos  156<H>  01-11              Culture - Blood (collected 11 Jan 2022 23:03)  Source: .Blood Blood-Peripheral  Preliminary Report (13 Jan 2022 01:01):    No growth to date.    Culture - Blood (collected 11 Jan 2022 23:03)  Source: .Blood Blood-Peripheral  Preliminary Report (13 Jan 2022 01:01):    No growth to date.        RADIOLOGY & ADDITIONAL TESTS:  Results Reviewed:   Imaging Personally Reviewed:  Electrocardiogram Personally Reviewed:    COORDINATION OF CARE:  Care Discussed with Consultants/Other Providers [Y/N]:  Prior or Outpatient Records Reviewed [Y/N]:   *****************************************  Riddhi Saeed MD I PGY1  Internal Medicine  Pager NS: [phone] I [facility]: [phone]  *****************************************      Patient is a 56y old  Male who presents with a chief complaint of left index finger abscess (12 Jan 2022 14:23)      SUBJECTIVE : NAEO. Pt seen and examined at bedside. Notes that his finger pain has improved. Denies CP, SOB, fever/chills, dysuria, hematuria, diarrhea/constipation.       MEDICATIONS  (STANDING):  amoxicillin  875 milliGRAM(s)/clavulanate 1 Tablet(s) Oral two times a day  dextrose 40% Gel 15 Gram(s) Oral once  dextrose 5%. 1000 milliLiter(s) (50 mL/Hr) IV Continuous <Continuous>  dextrose 5%. 1000 milliLiter(s) (100 mL/Hr) IV Continuous <Continuous>  dextrose 50% Injectable 25 Gram(s) IV Push once  dextrose 50% Injectable 12.5 Gram(s) IV Push once  dextrose 50% Injectable 25 Gram(s) IV Push once  doxycycline hyclate Capsule 100 milliGRAM(s) Oral every 12 hours  enoxaparin Injectable 40 milliGRAM(s) SubCutaneous two times a day  glucagon  Injectable 1 milliGRAM(s) IntraMuscular once  hydrALAZINE 25 milliGRAM(s) Oral three times a day  insulin lispro (ADMELOG) corrective regimen sliding scale   SubCutaneous three times a day before meals  insulin lispro (ADMELOG) corrective regimen sliding scale   SubCutaneous at bedtime  lisinopril 20 milliGRAM(s) Oral daily  mirtazapine 7.5 milliGRAM(s) Oral at bedtime  PARoxetine 60 milliGRAM(s) Oral daily    MEDICATIONS  (PRN):  acetaminophen     Tablet .. 650 milliGRAM(s) Oral every 6 hours PRN Temp greater or equal to 38C (100.4F), Mild Pain (1 - 3), Moderate Pain (4 - 6)  melatonin 6 milliGRAM(s) Oral at bedtime PRN Insomnia      CAPILLARY BLOOD GLUCOSE      POCT Blood Glucose.: 138 mg/dL (12 Jan 2022 21:53)  POCT Blood Glucose.: 106 mg/dL (12 Jan 2022 17:51)  POCT Blood Glucose.: 81 mg/dL (12 Jan 2022 12:24)  POCT Blood Glucose.: 246 mg/dL (12 Jan 2022 08:55)  POCT Blood Glucose.: 122 mg/dL (12 Jan 2022 07:37)    I&O's Summary      PHYSICAL EXAM:  Vital Signs Last 24 Hrs  T(C): 36.6 (13 Jan 2022 05:07), Max: 37.2 (12 Jan 2022 21:36)  T(F): 97.8 (13 Jan 2022 05:07), Max: 99 (12 Jan 2022 21:36)  HR: 83 (13 Jan 2022 05:07) (76 - 96)  BP: 158/82 (13 Jan 2022 05:07) (128/70 - 173/93)  BP(mean): --  RR: 18 (13 Jan 2022 05:07) (17 - 19)  SpO2: 94% (13 Jan 2022 05:07) (93% - 98%)    GENERAL: No acute distress, obese, no teeth   HEAD:  Atraumatic, Normocephalic  EYES: EOMI, conjunctiva and sclera clear  NECK: Supple, no lymphadenopathy, no JVD  CHEST/LUNG: CTAB; No wheezes, rales, or rhonchi  HEART: RRR; No murmurs, rubs, or gallops, 1+ pitting edema bilaterally  ABDOMEN: Soft, non-tender, non-distended; normal bowel sounds, no organomegaly  EXTREMITIES:  (+) b/l LE edema, 1+ pitting from feet to mid-shin. 2+ peripheral pulses b/l, No clubbing, cyanosis  MUSCULOSKELETAL: (+) bandage over I&D site on left index finger. Erythema and TTP of left index finger. Sensation intact, ROM somewhat limited  NEUROLOGY: AAO x 4, no focal deficits. CN II-XII intact. Reflexes and sensation present and intact.   SKIN: No rashes or lesions  LABS:                        14.1   8.29  )-----------( 215      ( 12 Jan 2022 07:49 )             44.0     01-12    135  |  96<L>  |  7   ----------------------------<  128<H>  3.5   |  26  |  0.68    Ca    8.9      12 Jan 2022 07:49  Phos  3.2     01-12  Mg     1.90     01-12    TPro  7.4  /  Alb  4.0  /  TBili  <0.2  /  DBili  x   /  AST  36  /  ALT  28  /  AlkPhos  156<H>  01-11              Culture - Blood (collected 11 Jan 2022 23:03)  Source: .Blood Blood-Peripheral  Preliminary Report (13 Jan 2022 01:01):    No growth to date.    Culture - Blood (collected 11 Jan 2022 23:03)  Source: .Blood Blood-Peripheral  Preliminary Report (13 Jan 2022 01:01):    No growth to date.        RADIOLOGY & ADDITIONAL TESTS:  Results Reviewed: Y  Imaging Personally Reviewed:  Electrocardiogram Personally Reviewed:    COORDINATION OF CARE:  Care Discussed with Consultants/Other Providers [Y/N]:  Prior or Outpatient Records Reviewed [Y/N]:

## 2022-01-13 NOTE — BH CONSULTATION LIAISON ASSESSMENT NOTE - CURRENT MEDICATION
MEDICATIONS  (STANDING):  amoxicillin  875 milliGRAM(s)/clavulanate 1 Tablet(s) Oral two times a day  dextrose 40% Gel 15 Gram(s) Oral once  dextrose 5%. 1000 milliLiter(s) (50 mL/Hr) IV Continuous <Continuous>  dextrose 5%. 1000 milliLiter(s) (100 mL/Hr) IV Continuous <Continuous>  dextrose 50% Injectable 25 Gram(s) IV Push once  dextrose 50% Injectable 12.5 Gram(s) IV Push once  dextrose 50% Injectable 25 Gram(s) IV Push once  doxycycline hyclate Capsule 100 milliGRAM(s) Oral every 12 hours  enoxaparin Injectable 40 milliGRAM(s) SubCutaneous two times a day  glucagon  Injectable 1 milliGRAM(s) IntraMuscular once  hydrALAZINE 25 milliGRAM(s) Oral three times a day  insulin lispro (ADMELOG) corrective regimen sliding scale   SubCutaneous three times a day before meals  insulin lispro (ADMELOG) corrective regimen sliding scale   SubCutaneous at bedtime  lisinopril 20 milliGRAM(s) Oral daily  mirtazapine 7.5 milliGRAM(s) Oral at bedtime  PARoxetine 60 milliGRAM(s) Oral daily    MEDICATIONS  (PRN):  acetaminophen     Tablet .. 650 milliGRAM(s) Oral every 6 hours PRN Temp greater or equal to 38C (100.4F), Mild Pain (1 - 3), Moderate Pain (4 - 6)  melatonin 6 milliGRAM(s) Oral at bedtime PRN Insomnia

## 2022-01-13 NOTE — BH CONSULTATION LIAISON ASSESSMENT NOTE - SUMMARY
Garrett Cristina is a 56yoM domiciled in supportive housing, on disability, PMH HTN, DM, hypogammaglobulinemia, PPHx schizoaffective d/o, hx of multiple psych hospitalizations (last known in 2020 at [facility]), denies hx SA/NSSIB, denies drug or alcohol use, denies legal hx, who presented with L finger abscess, s/p I&D. Psychiatry consulted for medication management.    Patient is euthymic with no s/s of psychosis at this time. Patient endorses missing December Haldol Dec injection, consistent w/ documentation in CVM. He reports compliance with other medications (Remeron, Wellbutrin, Paxil). No imminent safety concerns at this time, patient does not meet criteria for inpatient hospitalization. Patient safe to be discharged and f/u with outpatient tx w/ Dr. Hollie Cook.     Plan:  *INCOMPLETE*  Routine observation, no indication for CO 1:1 from psychiatric standpoint  Give Haldol Decanoate 150mg IM, next injection due in 4 weeks (02/10/22)  Continue Remeron 7.5mg qHS, Wellbutrin 300mg daily, and Paxil 60mg daily  Dispo: medical clearance as per primary team, patient will f/u with outpatient provider Dr. Hollie Cook at [facility]  *INCOMPLETE* Garrett Cristina is a 56yoM domiciled in supportive housing, on disability, PMH HTN, DM, hypogammaglobulinemia, PPHx schizoaffective d/o, hx of multiple psych hospitalizations (last known in 2020 at [facility]), denies hx SA/NSSIB, denies drug or alcohol use, denies legal hx, who presented with L finger abscess, s/p I&D. Psychiatry consulted for medication management.    Patient is euthymic with no s/s of psychosis at this time. Patient endorses missing December Haldol Dec injection, consistent w/ documentation in CVM. He reports compliance with other medications (Remeron, Wellbutrin, Paxil). No imminent safety concerns at this time, patient does not meet criteria for inpatient hospitalization. Patient safe to be discharged and f/u with outpatient tx w/ Dr. Hollie Cook.     Plan:  Routine observation, no indication for CO 1:1 from psychiatric standpoint  Give Haldol Decanoate 150mg IM, next injection due in 4 weeks (02/10/22). qtc: 453  Continue Remeron 7.5mg qHS, Wellbutrin 300mg daily, and Paxil 60mg daily  Dispo: medical clearance as per primary team, patient will f/u with outpatient provider Dr. Hollie Cook at [facility]

## 2022-01-13 NOTE — BH CONSULTATION LIAISON ASSESSMENT NOTE - NSICDXPASTMEDICALHX_GEN_ALL_CORE_FT
PAST MEDICAL HISTORY:  Abscess of finger     DM (diabetes mellitus)     HTN (hypertension)     Hypogammaglobulinemia     Smoker

## 2022-01-13 NOTE — BH CONSULTATION LIAISON ASSESSMENT NOTE - VIOLENCE PROTECTIVE FACTORS:
Residential stability/Sobriety/Engagement in treatment/Insight into violence risk and need for management/treatment

## 2022-01-13 NOTE — BH CONSULTATION LIAISON ASSESSMENT NOTE - OTHER PAST PSYCHIATRIC HISTORY (INCLUDE DETAILS REGARDING ONSET, COURSE OF ILLNESS, INPATIENT/OUTPATIENT TREATMENT)
reported hx of schizoaffective d/o, with no past SA/SIB, hx of multiple psych hospitalizations (last known in [place] [facility] 10/2020 - 11/2020), with hx of outpatient psych services at [facility]/ Dr. Hollie Cook, last appt 12/15.

## 2022-01-13 NOTE — PROGRESS NOTE ADULT - PROBLEM SELECTOR PLAN 7
- Patient unaware of any medication that he currently takes  - Says he fills Rx's at [facility]   - No current medications in EMR that is showing it was filled recently  - [facility] pharmacists emailed for assistance - Diet: Consistent carb, DASH  - DVT prophylaxis: Lovenox 40 mg BID  - Activity: Activity as tolerated

## 2022-01-13 NOTE — PROGRESS NOTE ADULT - PROBLEM SELECTOR PLAN 3
- No erythema noted, non-pitting  - TTE ordered  - b/l LE dopplers: neg for DVT   - BNP: wnl - No erythema noted, non-pitting  - TT: EF: 65%; normal LVSF   - b/l LE dopplers: neg for DVT   - BNP: wnl

## 2022-01-13 NOTE — PROGRESS NOTE ADULT - PROBLEM SELECTOR PLAN 5
- Patient states he gets an infusion with Dr. Boxer since he was 22 y/o
- Patient states he gets an infusion with Dr. Boxer since he was 24 y/o

## 2022-01-13 NOTE — BH CONSULTATION LIAISON ASSESSMENT NOTE - RISK ASSESSMENT
The patient is at chronically elevated risk of self harm and suicide. Risk factors include schizoaffective disorder, hx multiple hospitalizations, hx SI. Protective factors include stable housing, supportive family, engagement in treatment. Overall, the patient is not an acute and imminent risk of harm and does not meet criteria for psychiatric hospitalization for safety and stabilization.

## 2022-01-13 NOTE — PROGRESS NOTE ADULT - ATTENDING COMMENTS
Patient seen and examined at bedside. In brief,  57 y/o male with PMH of HTN, DM, hypogammaglobulinemia, and schizoaffective disorder presented to the hospital ED for pain and redness in left pointer finger found to have finger xray concerning for possible osteomyelitis. . Pt is s/p  I&D'd,   by plastic surgery, now on Vanc/Zosyn, awaiting MRI of left hand     - Will continue with vanc/zosyn   - F/U  MRI left hand to r/o OM.   -ID consult, appreciate recs   #Lower extremity edema: May be side effect of amlodipine but reportedly has been on for a year and swelling happened more recently. proBNP 45   -  duplex without DVT   - F/U  echo,  #Psych:  med rec for psych meds appreciate help. Haldol Deconate Inj 150mg once a month: was last dispensed to patient in Nov/2021 (however unsure if it was administered to patient by clinic?). Will follow up with psych re: if he needs another dose.   # HTN,  - c/w  lisinopril and hydralazine and uptitrate as necessary.  -  Not symptomatic from elevated BP.
Patient seen and examined at bedside. In brief,  55 y/o male with PMH of HTN, DM, hypogammaglobulinemia, and schizoaffective disorder presented to the hospital ED for pain and redness in left pointer finger found to have finger xray concerning for possible osteomyelitis. . Pt is s/p  I&D'd,   by plastic surgery, now on Vanc/Zosyn, awaiting MRI of left hand; Per discussion with ID, low concern for osteo. Will switch to oral abx for 7 days and outpatient f.u.     #Lower extremity edema: May be side effect of amlodipine but reportedly has been on for a year and swelling happened more recently. proBNP 45   -  duplex without DVT   - F/U  echo: WNL   #Psych:  med rec for psych meds appreciate help. Haldol Deconate Inj 150mg once a month: was last dispensed to patient in Nov/2021 (however unsure if it was administered to patient by clinic?). Will follow up with psych re: if he needs another dose. Plan for injection today with outpatient follow up   # HTN,  - c/w  lisinopril and hydralazine and uptitrate as necessary. Poorly controlled, will up-titrate to lisinopril 40mg   -  Not symptomatic from elevated BP.

## 2022-01-13 NOTE — PROVIDER CONTACT NOTE (OTHER) - ACTION/TREATMENT ORDERED:
5mg IV push hydralazine ordered. Recheck BP
Md notified and notified. Gives Hydralazine. Re-check Blood pressure one hour after the hydralazine.

## 2022-01-13 NOTE — PROGRESS NOTE ADULT - PROBLEM SELECTOR PLAN 2
SBPs 180-190s  - Unknown Home BP meds. States he previously took Amlodipine but ran out and his PCP told him to stop d/t LE edema  - f/u [facility] pharmacists   - c/w Lisinopril 20 mg QD and Hydralazine 25 mg TID   - monitor BP   - DASH/TLC diet SBPs 180-190s  - Unknown Home BP meds. States he previously took Amlodipine but ran out and his PCP told him to stop d/t LE edema. Per [facility] pharm, pt was not prescribed any anti-HTN's recently although there was dilt 180 on hold   - c/w Lisinopril 20 mg QD and Hydralazine 25 mg TID   - monitor BP   - DASH/TLC diet

## 2022-01-13 NOTE — BH CONSULTATION LIAISON ASSESSMENT NOTE - NSBHCHARTREVIEWVS_PSY_A_CORE FT
Vital Signs Last 24 Hrs  T(C): 36.6 (13 Jan 2022 05:07), Max: 37.2 (12 Jan 2022 21:36)  T(F): 97.8 (13 Jan 2022 05:07), Max: 99 (12 Jan 2022 21:36)  HR: 83 (13 Jan 2022 05:07) (76 - 96)  BP: 158/82 (13 Jan 2022 05:07) (128/70 - 173/93)  RR: 18 (13 Jan 2022 05:07) (17 - 18)  SpO2: 94% (13 Jan 2022 05:07) (93% - 98%)

## 2022-01-14 PROBLEM — I10 ESSENTIAL (PRIMARY) HYPERTENSION: Chronic | Status: ACTIVE | Noted: 2022-01-12

## 2022-01-14 PROBLEM — L02.519: Chronic | Status: ACTIVE | Noted: 2022-01-13

## 2022-01-14 LAB
-  AMPICILLIN/SULBACTAM: SIGNIFICANT CHANGE UP
-  CEFAZOLIN: SIGNIFICANT CHANGE UP
-  CLINDAMYCIN: SIGNIFICANT CHANGE UP
-  ERYTHROMYCIN: SIGNIFICANT CHANGE UP
-  GENTAMICIN: SIGNIFICANT CHANGE UP
-  OXACILLIN: SIGNIFICANT CHANGE UP
-  PENICILLIN: SIGNIFICANT CHANGE UP
-  RIFAMPIN: SIGNIFICANT CHANGE UP
-  TETRACYCLINE: SIGNIFICANT CHANGE UP
-  TRIMETHOPRIM/SULFAMETHOXAZOLE: SIGNIFICANT CHANGE UP
-  VANCOMYCIN: SIGNIFICANT CHANGE UP
METHOD TYPE: SIGNIFICANT CHANGE UP

## 2022-01-17 LAB
CULTURE RESULTS: SIGNIFICANT CHANGE UP
ORGANISM # SPEC MICROSCOPIC CNT: SIGNIFICANT CHANGE UP
ORGANISM # SPEC MICROSCOPIC CNT: SIGNIFICANT CHANGE UP
[facility] SOURCE: SIGNIFICANT CHANGE UP

## 2022-01-19 ENCOUNTER — APPOINTMENT (OUTPATIENT)
Dept: INFECTIOUS DISEASE | Facility: CLINIC | Age: 57
End: 2022-01-19
Payer: MEDICARE

## 2022-01-19 VITALS
HEART RATE: 92 BPM | RESPIRATION RATE: 16 BRPM | TEMPERATURE: 97.8 F | WEIGHT: 277 LBS | BODY MASS INDEX: 35.55 KG/M2 | OXYGEN SATURATION: 95 % | DIASTOLIC BLOOD PRESSURE: 98 MMHG | HEIGHT: 74 IN | SYSTOLIC BLOOD PRESSURE: 160 MMHG

## 2022-01-19 DIAGNOSIS — A49.01 METHICILLIN SUSCEPTIBLE STAPHYLOCOCCUS AUREUS INFECTION, UNSPECIFIED SITE: ICD-10-CM

## 2022-01-19 PROCEDURE — 99214 OFFICE O/P EST MOD 30 MIN: CPT

## 2022-01-19 NOTE — HISTORY OF PRESENT ILLNESS
[FreeTextEntry1] : 56 year old with CVID recetnly admitted with finger abscess.\par \par S/p I and D- Cx with MSSa\par \par D/c on augmentin\par \par No fever or chills. \par Still has large wound to finger. \par \par

## 2022-01-19 NOTE — ASSESSMENT
[FreeTextEntry1] : 56 year old with bipolar disorder, cvid (next ivig early february) with finger abscess s/p I and D.\par \par Extend augmentin for 5 more days. \par \par Wound care eval to assist with wound healing\par \par RTC as needed. \par \par

## 2022-01-19 NOTE — PHYSICAL EXAM
[General Appearance - Alert] : alert [Sclera] : the sclera and conjunctiva were normal [Neck Appearance] : the appearance of the neck was normal [] : no respiratory distress [Exaggerated Use Of Accessory Muscles For Inspiration] : no accessory muscle use [Heart Sounds] : normal S1 and S2 [FreeTextEntry1] : finger owund left index- clean base- no pus, no cellultiis

## 2022-01-20 ENCOUNTER — NON-APPOINTMENT (OUTPATIENT)
Age: 57
End: 2022-01-20

## 2022-01-21 ENCOUNTER — APPOINTMENT (OUTPATIENT)
Dept: WOUND CARE | Facility: CLINIC | Age: 57
End: 2022-01-21
Payer: MEDICARE

## 2022-01-21 DIAGNOSIS — Z87.01 PERSONAL HISTORY OF PNEUMONIA (RECURRENT): ICD-10-CM

## 2022-01-21 PROCEDURE — 99204 OFFICE O/P NEW MOD 45 MIN: CPT

## 2022-01-21 RX ORDER — LISINOPRIL 40 MG/1
40 TABLET ORAL
Qty: 30 | Refills: 0 | Status: ACTIVE | COMMUNITY
Start: 2022-01-13

## 2022-01-21 RX ORDER — MIRTAZAPINE 15 MG/1
15 TABLET, FILM COATED ORAL
Qty: 15 | Refills: 0 | Status: ACTIVE | COMMUNITY
Start: 2021-12-14

## 2022-01-21 RX ORDER — DILTIAZEM HYDROCHLORIDE 180 MG/1
180 CAPSULE, EXTENDED RELEASE ORAL
Qty: 30 | Refills: 0 | Status: ACTIVE | COMMUNITY
Start: 2022-01-11

## 2022-01-21 RX ORDER — SILVER SULFADIAZINE 10 MG/G
1 CREAM TOPICAL DAILY
Qty: 1 | Refills: 0 | Status: ACTIVE | COMMUNITY
Start: 2022-01-21 | End: 1900-01-01

## 2022-01-21 RX ORDER — HYDRALAZINE HYDROCHLORIDE 25 MG/1
25 TABLET ORAL
Qty: 90 | Refills: 0 | Status: ACTIVE | COMMUNITY
Start: 2022-01-13

## 2022-01-26 NOTE — HISTORY OF PRESENT ILLNESS
[FreeTextEntry1] : location left finger abscess\par severity had i/d in hospital\par timing/duration weeks\par quality no bleeding\par other pain\par

## 2022-01-26 NOTE — PHYSICAL EXAM
[JVD] : no jugular venous distention  [Normal Breath Sounds] : Normal breath sounds [Normal Rate and Rhythm] : normal rate and rhythm [2+] : left 2+ [Abdomen Tenderness] : ~T ~M No abdominal tenderness [Skin Ulcer] : ulcer [Alert] : alert [Oriented to Time] : oriented to time [Calm] : calm [de-identified] : nad [de-identified] : saurav [de-identified] : swollen finger left [Please See PDF for [employer]] : Please See PDF for [employer].

## 2022-01-26 NOTE — DATA REVIEWED
[FreeTextEntry1] : Reason for Referral/Consultation: left index finger abscess\par - Reason for Admission left index finger abscess\par - Subjective and Objective:\par Patient is a 56y old Male who presents with a chief complaint of left index\par finger abscess (12 Jan 2022 07:58)\par HPI:57 y/o male with PMH of HTN, DM, hypogammaglobulinemia, and schizoaffective\par disorder presented to the hospital ED for pain and redness in left pointer\par finger. Patient states that about 5 days ago he noticed a "pimple" on the tip\par of his left pointer finger which was painful so he used a needle to drain it.\par He says the pain at its worst was a 8/10 and currently is now a 5/10 achy\par feeling. He was prescribed Augmentin and took the medication for 3 days. Over\par the next couple of days the redness at the tip of his finger traveled\par proximally to the base of his finger and the "pimple" he popped became filled\par with pus. He had difficulty flexing his finger and it became painful to\par palpation again. He went back to his primary care doctor who then prompted him\par to come to the ED. Additionally, patient noticed swelling in b/l feet and legs\par for 3 weeks. He was told by PMD it might be related to amlodipine but patient\par has been on for a year. He also has ran out of amlodipine for last two weeks.\par Denies fever, chills, nausea, HA, difficulty moving other fingers or wrist,\par pain traveling up arm, or expanding erythema past his left pointer finger, CP,\par SOB, orthopnea, redness in legs, pain in calves. In the ED patient was seen by\par plastic surgery, I&D'd, and bandaged. He had been given Tylenol 975 mg PO with\par minimal improvement.\par \par Patient is unsure of his med dosing but states he is on remeron, paxil, haldol\par injections. Ran out of amlodipine (12 Jan 2022 01:01)\par \par ID consult for left index finger abscess.\par It developed out of no-where 6-7 days ago, he did put it in warm water. No\par recent nail cut, finger bite or injury.\par He started to take Augmentin 3 days after.\par After taking 3 days of Augmentin, it's not improving so he was advised to come\par to ED.\par \par He denies hx of infection. But he had a boil on his back that popped and\par resolved on its own once.\par He had 2 doses of Moderna vaccine in 03/2021.\par \par REVIEW OF SYSTEMS\par [ ] ROS unobtainable because:\par [ X ] All other systems negative except as noted below: everything else is\par negative\par \par Constitutional: [ ] fever [ ] chills [ ] weight loss [ ]night sweat [ ]poor\par appetite/PO intake [ ]fatigue\par Skin: [ ] rash [ ] phlebitis \par Eyes: [ ] icterus [ ] pain [ ] discharge \par ENMT: [ ] sore throat [ ] thrush [ ] ulcers [ ] exudates [ ]anosmia\par Respiratory: [ ] dyspnea [ ] hemoptysis [ ] cough [ ] sputum \par Cardiovascular: [ ] chest pain [ ] palpitations [ ] edema \par Gastrointestinal: [ ] nausea [ ] vomiting [ ] diarrhea [ ] constipation [ ]\par pain \par Genitourinary: [ ] dysuria [ ] frequency [ ] hematuria [ ] discharge [ ] flank\par pain [ ] incontinence\par Musculoskeletal: [ ] myalgias [ ] arthralgias [ ] arthritis [ ] back pain\par Neurological: [ ] headache [ ] weakness [ ] seizures [ ] confusion/altered\par mental status\par \par prior hospital charts reviewed [V]\par primary team notes reviewed [V]\par other consultant notes reviewed [V]\par \par PAST MEDICAL & SURGICAL HISTORY:\par Bipolar 1 disorder\par IgG deficiency\par Smoker\par DM (diabetes mellitus)\par Dental caries\par HTN (hypertension)\par Hypertriglyceridemia\par Deviated septum\par Loss of teeth due to extraction All teeth due to dental carries\par \par \par SOCIAL HISTORY:\par - Smoker+ smoking 2 packs per day for 25 years\par - No EtOH\par - Used to do marijuana but not now\par - Lives alone\par \par FAMILY HISTORY:\par Family history of throat cancer (Father)\par Family history of leukemia (Mother)\par \par Allergies\par amoxicillin (Fever)\par penicillin (Rash)\par \par ANTIMICROBIALS:\par piperacillin/tazobactam IVPB.. 3.375 every 8 hours\par vancomycin IVPB 1500 every 12 hours\par \par ANTIMICROBIALS (past 90 days):\par MEDICATIONS (STANDING):\par piperacillin/tazobactam IVPB..\par  25 mL/Hr IV Intermittent (01-12-22 @ 12:53)\par  25 mL/Hr IV Intermittent (01-12-22 @ 04:23)\par \par piperacillin/tazobactam IVPB...\par  200 mL/Hr IV Intermittent (01-11-22 @ 19:14)\par \par vancomycin IVPB\par  300 mL/Hr IV Intermittent (01-12-22 @ 09:43)\par \par vancomycin IVPB.\par  250 mL/Hr IV Intermittent (01-11-22 @ 21:13)\par \par \par OTHER MEDS:\par MEDICATIONS (STANDING):\par acetaminophen Tablet .. 650 every 6 hours PRN\par dextrose 40% Gel 15 once\par dextrose 50% Injectable 25 once\par dextrose 50% Injectable 12.5 once\par dextrose 50% Injectable 25 once\par enoxaparin Injectable 40 two times a day\par glucagon Injectable 1 once\par hydrALAZINE 25 three times a day\par insulin lispro (ADMELOG) corrective regimen sliding scale three times a day\par before meals\par insulin lispro (ADMELOG) corrective regimen sliding scale at bedtime\par lisinopril 20 daily\par melatonin 6 at bedtime PRN\par \par \par VITALS:\par Vital Signs Last 24 Hrs\par T(F): 97.6 (01-12-22 @ 08:45), Max: 98.7 (01-12-22 @ 06:40)\par \par Vital Signs Last 24 Hrs\par HR: 66 (01-12-22 @ 10:40) (66 - 89)\par BP: 80/48 (01-12-22 @ 10:40) (80/48 - 192/113)\par RR: 18 (01-12-22 @ 10:40)\par SpO2: 100% (01-12-22 @ 10:40) (96% - 100%)\par Wt(kg): --Physical Exam:\par Constitutional: well preserved, comfortable\par Head/Eyes: no icterus, PERRL, EOMI\par ENT: supple; no thrush\par LUNGS: CTA\par CVS: normal S1, S2, no murmur\par Abd: soft, non-tender; non-distended\par Ext: no edema; left first finger redness and mild tenderness, in gauze\par Vascular: IV site no erythema tenderness or discharge\par MSK: joints without swelling\par Neuro: AAO X 3, non- focal\par \par Labs:\par \par  14.1\par 8.29 )-----------( 215 ( 12 Jan 2022 07:49 )\par  44.0\par \par 01-12\par \par 135 | 96<L> | 7\par ----------------------------< 128<H>\par 3.5 | 26 | 0.68\par \par Ca 8.9 12 Jan 2022 07:49\par Phos 3.2 01-12\par Mg 1.90 01-12\par \par TPro 7.4 / Alb 4.0 / TBili <0.2 / DBili x / AST 36 / ALT 28 /\par  AlkPhos 156<H> 01-11\par \par \par WBC Trend:\par WBC Count: 8.29 (01-12-22 @ 07:49)\par WBC Count: 8.56 (01-11-22 @ 18:53)\par \par Auto Neutrophil #: 5.57 K/uL (01-11-22 @ 18:53)\par \par Creatine Trend:\par Creatinine, Serum: 0.68 (01-12)\par Creatinine, Serum: 0.71 (01-11)\par \par Liver Biochemical Testing Trend:\par Alanine Aminotransferase (ALT/SGPT): 28 (01-11)\par Aspartate Aminotransferase (AST/SGOT): 36 (01-11-22 @ 18:53)\par Bilirubin Total, Serum: <0.2 (01-11)\par \par MICROBIOLOGY:\par \par COVID-19 PCR: NotDetec (01-11-22 @ 19:48)\par \par Sedimentation Rate, Erythrocyte: 34 mm/hr (01.12.22 @ 07:49)\par C-Reactive Protein, Serum: 29.4 (01-12)\par \par Serum Pro-Brain Natriuretic Peptide: 45 (01-12)\par \par A1C with Estimated Average Glucose Result: 6.4 % (01-12-22 @ 07:49)\par \par RADIOLOGY:\par imaging below personally reviewed\par \par < from: Xray Hand 3 Views, Left (01.11.22 @ 19:18) >\par IMPRESSION:\par Diffuse soft tissue swelling of the index finger. No gross radiographic\par evidence of osteomyelitis. MRI can be performed to more sensitively\par evaluate for osteomyelitis, if this is a clinical concern.\par No acute fracture or dislocation. No advanced arthritic changes.\par < end of copied text >\par \par < from: Xray Chest 2 Views PA/Lat (01.11.22 @ 19:18) >IMPRESSION: No acute pulmonary disease.\par < end of copied text >\par Assessment and Recommendation:- Assessment \par 57 y/o male with PMH of HTN, DM, hypogammaglobulinemia, and schizoaffective\par disorder presented to the hospital ED for pain and redness in left pointer\par finger. Patient states that about 5 days ago he noticed a "pimple" on the tip\par of his left pointer finger which was painful so he used a needle to drain it.\par Took 3 days of Augmentin but getting worse. X-ray with diffuse swelling of the\par index finger, no gross OM. ID consult for left index finger abscess. Plastic\par surgery did I&D and took the culture.\par \par #Left index finger abscess\par - s/p I&D by plastic\par - follow up pus culture and BCx\par - No need MRI given the infection was less than a week and X-ray did not show\par gross OM, however given his immunocompromised state of receiving monthly IVIG,\par he should follow up with ID clinic to make sure abscess resolved\par - Can change IV abx to PO doxycyline 100mg BID and Augmetin 875 mg po q 12 for7 days\par Prosper Aguilar MD, [id number] fellowPager: [phone]\par [facility] pager ID: [phone] (would prefer to text page for any new consult or question,\par please include name/location and best call back number)\par After 5pm/weekends call [phone]\par d/w Dr. Moncada\par Recs conveyed to primary team\par Attending supervision statement: I have personally seen and examined the\par patient. I fully participated in the care of this patient. I have made\par amendments to the documentation where necessary, and agree with the history,\par physical exam, and plan as documented by the Fellow.\par 56 year old immunocompromised with CVIDwith finger abscess after biteS/o I and D\par Culture without growthCan change to Augmetin 875 po q 12 with doxycyline 100 q 12 for 7 more days.\par Doubt OM this soon - would hold off on MRI\par Can follow up as outpt with me [phone].\par Electronic Signatures:\par Prosper Aguilar) (Signed 12-Jan-2022 15:59)

## 2022-01-26 NOTE — ASSESSMENT
[FreeTextEntry1] : 57 y/o male with PMH of HTN, DM, hypogammaglobulinemia, and schizoaffective\par known to Dr Boxer\par disorder  left pointer  finger ulcer, s/p i/D, no gross OM.\par   Plastic surgery did I&D and took the culture. + staph \par  \par  datacomplexity- mod lab, xr, old rec, test resultsreview,, visualize imagecptreview previous\par risk- mod surgery mechanical debridement excess dry slough skin\par tolerated well\par SSD ordered\par dressing ordered\par

## 2022-02-04 ENCOUNTER — APPOINTMENT (OUTPATIENT)
Dept: WOUND CARE | Facility: CLINIC | Age: 57
End: 2022-02-04
Payer: MEDICARE

## 2022-02-04 VITALS
HEART RATE: 71 BPM | OXYGEN SATURATION: 97 % | BODY MASS INDEX: 34.65 KG/M2 | DIASTOLIC BLOOD PRESSURE: 93 MMHG | HEIGHT: 74 IN | WEIGHT: 270 LBS | TEMPERATURE: 97.4 F | SYSTOLIC BLOOD PRESSURE: 163 MMHG

## 2022-02-04 PROCEDURE — 99213 OFFICE O/P EST LOW 20 MIN: CPT

## 2022-02-05 NOTE — DATA REVIEWED
[FreeTextEntry1] : Reason for Referral/Consultation: left index finger abscess\par - Reason for Admission left index finger abscess\par - Subjective and Objective:\par Patient is a 56y old Male who presents with a chief complaint of left index\par finger abscess (12 Jan 2022 07:58)\par HPI:57 y/o male with PMH of HTN, DM, hypogammaglobulinemia, and schizoaffective\par disorder presented to the hospital ED for pain and redness in left pointer\par finger. Patient states that about 5 days ago he noticed a "pimple" on the tip\par of his left pointer finger which was painful so he used a needle to drain it.\par He says the pain at its worst was a 8/10 and currently is now a 5/10 achy\par feeling. He was prescribed Augmentin and took the medication for 3 days. Over\par the next couple of days the redness at the tip of his finger traveled\par proximally to the base of his finger and the "pimple" he popped became filled\par with pus. He had difficulty flexing his finger and it became painful to\par palpation again. He went back to his primary care doctor who then prompted him\par to come to the ED. Additionally, patient noticed swelling in b/l feet and legs\par for 3 weeks. He was told by PMD it might be related to amlodipine but patient\par has been on for a year. He also has ran out of amlodipine for last two weeks.\par Denies fever, chills, nausea, HA, difficulty moving other fingers or wrist,\par pain traveling up arm, or expanding erythema past his left pointer finger, CP,\par SOB, orthopnea, redness in legs, pain in calves. In the ED patient was seen by\par plastic surgery, I&D'd, and bandaged. He had been given Tylenol 975 mg PO with\par minimal improvement.\par \par Patient is unsure of his med dosing but states he is on remeron, paxil, haldol\par injections. Ran out of amlodipine (12 Jan 2022 01:01)\par \par ID consult for left index finger abscess.\par It developed out of no-where 6-7 days ago, he did put it in warm water. No\par recent nail cut, finger bite or injury.\par He started to take Augmentin 3 days after.\par After taking 3 days of Augmentin, it's not improving so he was advised to come\par to ED.\par \par He denies hx of infection. But he had a boil on his back that popped and\par resolved on its own once.\par He had 2 doses of Moderna vaccine in 03/2021.\par \par REVIEW OF SYSTEMS\par [ ] ROS unobtainable because:\par [ X ] All other systems negative except as noted below: everything else is\par negative\par \par Constitutional: [ ] fever [ ] chills [ ] weight loss [ ]night sweat [ ]poor\par appetite/PO intake [ ]fatigue\par Skin: [ ] rash [ ] phlebitis \par Eyes: [ ] icterus [ ] pain [ ] discharge \par ENMT: [ ] sore throat [ ] thrush [ ] ulcers [ ] exudates [ ]anosmia\par Respiratory: [ ] dyspnea [ ] hemoptysis [ ] cough [ ] sputum \par Cardiovascular: [ ] chest pain [ ] palpitations [ ] edema \par Gastrointestinal: [ ] nausea [ ] vomiting [ ] diarrhea [ ] constipation [ ]\par pain \par Genitourinary: [ ] dysuria [ ] frequency [ ] hematuria [ ] discharge [ ] flank\par pain [ ] incontinence\par Musculoskeletal: [ ] myalgias [ ] arthralgias [ ] arthritis [ ] back pain\par Neurological: [ ] headache [ ] weakness [ ] seizures [ ] confusion/altered\par mental status\par \par prior hospital charts reviewed [V]\par primary team notes reviewed [V]\par other consultant notes reviewed [V]\par \par PAST MEDICAL & SURGICAL HISTORY:\par Bipolar 1 disorder\par IgG deficiency\par Smoker\par DM (diabetes mellitus)\par Dental caries\par HTN (hypertension)\par Hypertriglyceridemia\par Deviated septum\par Loss of teeth due to extraction All teeth due to dental carries\par \par \par SOCIAL HISTORY:\par - Smoker+ smoking 2 packs per day for 25 years\par - No EtOH\par - Used to do marijuana but not now\par - Lives alone\par \par FAMILY HISTORY:\par Family history of throat cancer (Father)\par Family history of leukemia (Mother)\par \par Allergies\par amoxicillin (Fever)\par penicillin (Rash)\par \par ANTIMICROBIALS:\par piperacillin/tazobactam IVPB.. 3.375 every 8 hours\par vancomycin IVPB 1500 every 12 hours\par \par ANTIMICROBIALS (past 90 days):\par MEDICATIONS (STANDING):\par piperacillin/tazobactam IVPB..\par  25 mL/Hr IV Intermittent (01-12-22 @ 12:53)\par  25 mL/Hr IV Intermittent (01-12-22 @ 04:23)\par \par piperacillin/tazobactam IVPB...\par  200 mL/Hr IV Intermittent (01-11-22 @ 19:14)\par \par vancomycin IVPB\par  300 mL/Hr IV Intermittent (01-12-22 @ 09:43)\par \par vancomycin IVPB.\par  250 mL/Hr IV Intermittent (01-11-22 @ 21:13)\par \par \par OTHER MEDS:\par MEDICATIONS (STANDING):\par acetaminophen Tablet .. 650 every 6 hours PRN\par dextrose 40% Gel 15 once\par dextrose 50% Injectable 25 once\par dextrose 50% Injectable 12.5 once\par dextrose 50% Injectable 25 once\par enoxaparin Injectable 40 two times a day\par glucagon Injectable 1 once\par hydrALAZINE 25 three times a day\par insulin lispro (ADMELOG) corrective regimen sliding scale three times a day\par before meals\par insulin lispro (ADMELOG) corrective regimen sliding scale at bedtime\par lisinopril 20 daily\par melatonin 6 at bedtime PRN\par \par \par VITALS:\par Vital Signs Last 24 Hrs\par T(F): 97.6 (01-12-22 @ 08:45), Max: 98.7 (01-12-22 @ 06:40)\par \par Vital Signs Last 24 Hrs\par HR: 66 (01-12-22 @ 10:40) (66 - 89)\par BP: 80/48 (01-12-22 @ 10:40) (80/48 - 192/113)\par RR: 18 (01-12-22 @ 10:40)\par SpO2: 100% (01-12-22 @ 10:40) (96% - 100%)\par Wt(kg): --Physical Exam:\par Constitutional: well preserved, comfortable\par Head/Eyes: no icterus, PERRL, EOMI\par ENT: supple; no thrush\par LUNGS: CTA\par CVS: normal S1, S2, no murmur\par Abd: soft, non-tender; non-distended\par Ext: no edema; left first finger redness and mild tenderness, in gauze\par Vascular: IV site no erythema tenderness or discharge\par MSK: joints without swelling\par Neuro: AAO X 3, non- focal\par \par Labs:\par \par  14.1\par 8.29 )-----------( 215 ( 12 Jan 2022 07:49 )\par  44.0\par \par 01-12\par \par 135 | 96<L> | 7\par ----------------------------< 128<H>\par 3.5 | 26 | 0.68\par \par Ca 8.9 12 Jan 2022 07:49\par Phos 3.2 01-12\par Mg 1.90 01-12\par \par TPro 7.4 / Alb 4.0 / TBili <0.2 / DBili x / AST 36 / ALT 28 /\par  AlkPhos 156<H> 01-11\par \par \par WBC Trend:\par WBC Count: 8.29 (01-12-22 @ 07:49)\par WBC Count: 8.56 (01-11-22 @ 18:53)\par \par Auto Neutrophil #: 5.57 K/uL (01-11-22 @ 18:53)\par \par Creatine Trend:\par Creatinine, Serum: 0.68 (01-12)\par Creatinine, Serum: 0.71 (01-11)\par \par Liver Biochemical Testing Trend:\par Alanine Aminotransferase (ALT/SGPT): 28 (01-11)\par Aspartate Aminotransferase (AST/SGOT): 36 (01-11-22 @ 18:53)\par Bilirubin Total, Serum: <0.2 (01-11)\par \par MICROBIOLOGY:\par \par COVID-19 PCR: NotDetec (01-11-22 @ 19:48)\par \par Sedimentation Rate, Erythrocyte: 34 mm/hr (01.12.22 @ 07:49)\par C-Reactive Protein, Serum: 29.4 (01-12)\par \par Serum Pro-Brain Natriuretic Peptide: 45 (01-12)\par \par A1C with Estimated Average Glucose Result: 6.4 % (01-12-22 @ 07:49)\par \par RADIOLOGY:\par imaging below personally reviewed\par \par < from: Xray Hand 3 Views, Left (01.11.22 @ 19:18) >\par IMPRESSION:\par Diffuse soft tissue swelling of the index finger. No gross radiographic\par evidence of osteomyelitis. MRI can be performed to more sensitively\par evaluate for osteomyelitis, if this is a clinical concern.\par No acute fracture or dislocation. No advanced arthritic changes.\par < end of copied text >\par \par < from: Xray Chest 2 Views PA/Lat (01.11.22 @ 19:18) >IMPRESSION: No acute pulmonary disease.\par < end of copied text >\par Assessment and Recommendation:- Assessment \par 57 y/o male with PMH of HTN, DM, hypogammaglobulinemia, and schizoaffective\par disorder presented to the hospital ED for pain and redness in left pointer\par finger. Patient states that about 5 days ago he noticed a "pimple" on the tip\par of his left pointer finger which was painful so he used a needle to drain it.\par Took 3 days of Augmentin but getting worse. X-ray with diffuse swelling of the\par index finger, no gross OM. ID consult for left index finger abscess. Plastic\par surgery did I&D and took the culture.\par \par #Left index finger abscess\par - s/p I&D by plastic\par - follow up pus culture and BCx\par - No need MRI given the infection was less than a week and X-ray did not show\par gross OM, however given his immunocompromised state of receiving monthly IVIG,\par he should follow up with ID clinic to make sure abscess resolved\par - Can change IV abx to PO doxycyline 100mg BID and Augmetin 875 mg po q 12 for7 days\par Prosper Aguilar MD, PGY5ID fellowPager: [phone]\par [facility] pager ID: [phone] (would prefer to text page for any new consult or question,\par please include name/location and best call back number)\par After 5pm/weekends call [phone]\par d/w Dr. Moncada\par Recs conveyed to primary team\par Attending supervision statement: I have personally seen and examined the\par patient. I fully participated in the care of this patient. I have made\par amendments to the documentation where necessary, and agree with the history,\par physical exam, and plan as documented by the Fellow.\par 56 year old immunocompromised with CVIDwith finger abscess after biteS/o I and D\par Culture without growthCan change to Augmetin 875 po q 12 with doxycyline 100 q 12 for 7 more days.\par Doubt OM this soon - would hold off on MRI\par Can follow up as outpt with me [phone].\par Electronic Signatures:\par Prosper Aguilar) (Signed 12-Jan-2022 15:59)

## 2022-02-05 NOTE — ASSESSMENT
[FreeTextEntry1] : 57 y/o male with PMH of HTN, DM, hypogammaglobulinemia, and schizoaffective\par known to Dr Boxer\par disorder  left pointer  finger ulcer, s/p i/D, no gross OM.\par   Plastic surgery did I&D and took the culture. + staph \par  \par  datacomplexity- mod lab, xr, old rec, test resultsreview,, visualize imagecptreview previous\par risk- mod surgery mechanical debridement excess dry slough skin\par tolerated well\par SSD ordered\par dressing ordered\par \par \par 2/4/2022\par No clinical sign of infection\par Smoking cessation discussed\par Left Index open wound \par Area was cleaned, applied iodosorb\par Instructed to DC sulfadiazine\par Iodosorb and dressing ordered\par f/u in 2 weeks  \par \par

## 2022-02-05 NOTE — PHYSICAL EXAM
[Normal Breath Sounds] : Normal breath sounds [Normal Rate and Rhythm] : normal rate and rhythm [2+] : left 2+ [Skin Ulcer] : ulcer [Alert] : alert [Oriented to Time] : oriented to time [Calm] : calm [Please See PDF for Tissue Analytics] : Please See PDF for [employer]. [JVD] : no jugular venous distention  [Abdomen Tenderness] : ~T ~M No abdominal tenderness [de-identified] : nad [de-identified] : saurav [de-identified] : swollen finger left

## 2022-02-08 ENCOUNTER — APPOINTMENT (OUTPATIENT)
Dept: RHEUMATOLOGY | Facility: CLINIC | Age: 57
End: 2022-02-08
Payer: MEDICARE

## 2022-02-08 PROCEDURE — 96366 THER/PROPH/DIAG IV INF ADDON: CPT

## 2022-02-08 PROCEDURE — 96365 THER/PROPH/DIAG IV INF INIT: CPT

## 2022-02-15 PROCEDURE — 99214 OFFICE O/P EST MOD 30 MIN: CPT | Mod: 95

## 2022-02-18 ENCOUNTER — APPOINTMENT (OUTPATIENT)
Dept: WOUND CARE | Facility: CLINIC | Age: 57
End: 2022-02-18
Payer: MEDICARE

## 2022-02-18 VITALS
HEART RATE: 82 BPM | TEMPERATURE: 98.2 F | BODY MASS INDEX: 34.67 KG/M2 | WEIGHT: 270 LBS | SYSTOLIC BLOOD PRESSURE: 164 MMHG | RESPIRATION RATE: 16 BRPM | DIASTOLIC BLOOD PRESSURE: 99 MMHG

## 2022-02-18 DIAGNOSIS — E11.9 TYPE 2 DIABETES MELLITUS W/OUT COMPLICATIONS: ICD-10-CM

## 2022-02-18 DIAGNOSIS — F31.9 BIPOLAR DISORDER, UNSPECIFIED: ICD-10-CM

## 2022-02-18 DIAGNOSIS — S61.209D UNSPECIFIED OPEN WOUND OF UNSPECIFIED FINGER W/OUT DAMAGE TO NAIL, SUBSEQUENT ENCOUNTER: ICD-10-CM

## 2022-02-18 PROCEDURE — 99213 OFFICE O/P EST LOW 20 MIN: CPT

## 2022-02-18 NOTE — HISTORY OF PRESENT ILLNESS
[FreeTextEntry1] : location left finger abscess\par severity had i/d in hospital\par timing/duration weeks\par quality no bleeding\par other pain\par \par Has developed an additional wound on the right pointer finger \par Has been using silvadene for wound care \par Denies fever, night sweats, and recent infections  \par

## 2022-02-18 NOTE — PLAN
[FreeTextEntry1] : 2/18/22\par Plan - iodosorb ton be applied, cover with gauze and small roll gauze\par supplies ordered\par follow up 2-3 weeks

## 2022-02-18 NOTE — ASSESSMENT
[FreeTextEntry1] : 57 y/o male with PMH of HTN, DM, hypogammaglobulinemia, and schizoaffective\par known to Dr Boxer\par disorder  left pointer  finger ulcer, s/p i/D, no gross OM.\par   Plastic surgery did I&D and took the culture. + staph \par  \par  datacomplexity- mod lab, xr, old rec, test resultsreview,, visualize imagecptreview previous\par risk- mod surgery mechanical debridement excess dry slough skin\par tolerated well\par SSD ordered\par dressing ordered\par \par \par 2/4/2022\par No clinical sign of infection\par Smoking cessation discussed\par Left Index open wound \par Area was cleaned, applied iodosorb\par Instructed to DC sulfadiazine\par Iodosorb and dressing ordered\par f/u in 2 weeks  \par \par 2/18/22\par left index wound and right index wound \par still using silvadene\par left index finger, clean, almost healed\par right index finger, clean and pink, superficial\par No clinical sign of infection\par \par

## 2022-02-18 NOTE — PHYSICAL EXAM
[Normal Breath Sounds] : Normal breath sounds [Normal Rate and Rhythm] : normal rate and rhythm [2+] : right 2+ [Skin Ulcer] : ulcer [Alert] : alert [Oriented to Time] : oriented to time [Calm] : calm [Please See PDF for [employer] Analytics] : Please See PDF for [employer]. [JVD] : no jugular venous distention  [Abdomen Tenderness] : ~T ~M No abdominal tenderness [de-identified] : nad [de-identified] : saurav [de-identified] : swollen finger left

## 2022-02-18 NOTE — DATA REVIEWED
[FreeTextEntry1] : Reason for Referral/Consultation: left index finger abscess\par - Reason for Admission left index finger abscess\par - Subjective and Objective:\par Patient is a 56y old Male who presents with a chief complaint of left index\par finger abscess (12 Jan 2022 07:58)\par HPI:57 y/o male with PMH of HTN, DM, hypogammaglobulinemia, and schizoaffective\par disorder presented to the hospital ED for pain and redness in left pointer\par finger. Patient states that about 5 days ago he noticed a "pimple" on the tip\par of his left pointer finger which was painful so he used a needle to drain it.\par He says the pain at its worst was a 8/10 and currently is now a 5/10 achy\par feeling. He was prescribed Augmentin and took the medication for 3 days. Over\par the next couple of days the redness at the tip of his finger traveled\par proximally to the base of his finger and the "pimple" he popped became filled\par with pus. He had difficulty flexing his finger and it became painful to\par palpation again. He went back to his primary care doctor who then prompted him\par to come to the ED. Additionally, patient noticed swelling in b/l feet and legs\par for 3 weeks. He was told by PMD it might be related to amlodipine but patient\par has been on for a year. He also has ran out of amlodipine for last two weeks.\par Denies fever, chills, nausea, HA, difficulty moving other fingers or wrist,\par pain traveling up arm, or expanding erythema past his left pointer finger, CP,\par SOB, orthopnea, redness in legs, pain in calves. In the ED patient was seen by\par plastic surgery, I&D'd, and bandaged. He had been given Tylenol 975 mg PO with\par minimal improvement.\par \par Patient is unsure of his med dosing but states he is on remeron, paxil, haldol\par injections. Ran out of amlodipine (12 Jan 2022 01:01)\par \par ID consult for left index finger abscess.\par It developed out of no-where 6-7 days ago, he did put it in warm water. No\par recent nail cut, finger bite or injury.\par He started to take Augmentin 3 days after.\par After taking 3 days of Augmentin, it's not improving so he was advised to come\par to ED.\par \par He denies hx of infection. But he had a boil on his back that popped and\par resolved on its own once.\par He had 2 doses of Moderna vaccine in 03/2021.\par \par REVIEW OF SYSTEMS\par [ ] ROS unobtainable because:\par [ X ] All other systems negative except as noted below: everything else is\par negative\par \par Constitutional: [ ] fever [ ] chills [ ] weight loss [ ]night sweat [ ]poor\par appetite/PO intake [ ]fatigue\par Skin: [ ] rash [ ] phlebitis \par Eyes: [ ] icterus [ ] pain [ ] discharge \par ENMT: [ ] sore throat [ ] thrush [ ] ulcers [ ] exudates [ ]anosmia\par Respiratory: [ ] dyspnea [ ] hemoptysis [ ] cough [ ] sputum \par Cardiovascular: [ ] chest pain [ ] palpitations [ ] edema \par Gastrointestinal: [ ] nausea [ ] vomiting [ ] diarrhea [ ] constipation [ ]\par pain \par Genitourinary: [ ] dysuria [ ] frequency [ ] hematuria [ ] discharge [ ] flank\par pain [ ] incontinence\par Musculoskeletal: [ ] myalgias [ ] arthralgias [ ] arthritis [ ] back pain\par Neurological: [ ] headache [ ] weakness [ ] seizures [ ] confusion/altered\par mental status\par \par prior hospital charts reviewed [V]\par primary team notes reviewed [V]\par other consultant notes reviewed [V]\par \par PAST MEDICAL & SURGICAL HISTORY:\par Bipolar 1 disorder\par IgG deficiency\par Smoker\par DM (diabetes mellitus)\par Dental caries\par HTN (hypertension)\par Hypertriglyceridemia\par Deviated septum\par Loss of teeth due to extraction All teeth due to dental carries\par \par \par SOCIAL HISTORY:\par - Smoker+ smoking 2 packs per day for 25 years\par - No EtOH\par - Used to do marijuana but not now\par - Lives alone\par \par FAMILY HISTORY:\par Family history of throat cancer (Father)\par Family history of leukemia (Mother)\par \par Allergies\par amoxicillin (Fever)\par penicillin (Rash)\par \par ANTIMICROBIALS:\par piperacillin/tazobactam IVPB.. 3.375 every 8 hours\par vancomycin IVPB 1500 every 12 hours\par \par ANTIMICROBIALS (past 90 days):\par MEDICATIONS (STANDING):\par piperacillin/tazobactam IVPB..\par  25 mL/Hr IV Intermittent (01-12-22 @ 12:53)\par  25 mL/Hr IV Intermittent (01-12-22 @ 04:23)\par \par piperacillin/tazobactam IVPB...\par  200 mL/Hr IV Intermittent (01-11-22 @ 19:14)\par \par vancomycin IVPB\par  300 mL/Hr IV Intermittent (01-12-22 @ 09:43)\par \par vancomycin IVPB.\par  250 mL/Hr IV Intermittent (01-11-22 @ 21:13)\par \par \par OTHER MEDS:\par MEDICATIONS (STANDING):\par acetaminophen Tablet .. 650 every 6 hours PRN\par dextrose 40% Gel 15 once\par dextrose 50% Injectable 25 once\par dextrose 50% Injectable 12.5 once\par dextrose 50% Injectable 25 once\par enoxaparin Injectable 40 two times a day\par glucagon Injectable 1 once\par hydrALAZINE 25 three times a day\par insulin lispro (ADMELOG) corrective regimen sliding scale three times a day\par before meals\par insulin lispro (ADMELOG) corrective regimen sliding scale at bedtime\par lisinopril 20 daily\par melatonin 6 at bedtime PRN\par \par \par VITALS:\par Vital Signs Last 24 Hrs\par T(F): 97.6 (01-12-22 @ 08:45), Max: 98.7 (01-12-22 @ 06:40)\par \par Vital Signs Last 24 Hrs\par HR: 66 (01-12-22 @ 10:40) (66 - 89)\par BP: 80/48 (01-12-22 @ 10:40) (80/48 - 192/113)\par RR: 18 (01-12-22 @ 10:40)\par SpO2: 100% (01-12-22 @ 10:40) (96% - 100%)\par Wt(kg): --Physical Exam:\par Constitutional: well preserved, comfortable\par Head/Eyes: no icterus, PERRL, EOMI\par ENT: supple; no thrush\par LUNGS: CTA\par CVS: normal S1, S2, no murmur\par Abd: soft, non-tender; non-distended\par Ext: no edema; left first finger redness and mild tenderness, in gauze\par Vascular: IV site no erythema tenderness or discharge\par MSK: joints without swelling\par Neuro: AAO X 3, non- focal\par \par Labs:\par \par  14.1\par 8.29 )-----------( 215 ( 12 Jan 2022 07:49 )\par  44.0\par \par 01-12\par \par 135 | 96<L> | 7\par ----------------------------< 128<H>\par 3.5 | 26 | 0.68\par \par Ca 8.9 12 Jan 2022 07:49\par Phos 3.2 01-12\par Mg 1.90 01-12\par \par TPro 7.4 / Alb 4.0 / TBili <0.2 / DBili x / AST 36 / ALT 28 /\par  AlkPhos 156<H> 01-11\par \par \par WBC Trend:\par WBC Count: 8.29 (01-12-22 @ 07:49)\par WBC Count: 8.56 (01-11-22 @ 18:53)\par \par Auto Neutrophil #: 5.57 K/uL (01-11-22 @ 18:53)\par \par Creatine Trend:\par Creatinine, Serum: 0.68 (01-12)\par Creatinine, Serum: 0.71 (01-11)\par \par Liver Biochemical Testing Trend:\par Alanine Aminotransferase (ALT/SGPT): 28 (01-11)\par Aspartate Aminotransferase (AST/SGOT): 36 (01-11-22 @ 18:53)\par Bilirubin Total, Serum: <0.2 (01-11)\par \par MICROBIOLOGY:\par \par COVID-19 PCR: NotDetec (01-11-22 @ 19:48)\par \par Sedimentation Rate, Erythrocyte: 34 mm/hr (01.12.22 @ 07:49)\par C-Reactive Protein, Serum: 29.4 (01-12)\par \par Serum Pro-Brain Natriuretic Peptide: 45 (01-12)\par \par A1C with Estimated Average Glucose Result: 6.4 % (01-12-22 @ 07:49)\par \par RADIOLOGY:\par imaging below personally reviewed\par \par < from: Xray Hand 3 Views, Left (01.11.22 @ 19:18) >\par IMPRESSION:\par Diffuse soft tissue swelling of the index finger. No gross radiographic\par evidence of osteomyelitis. MRI can be performed to more sensitively\par evaluate for osteomyelitis, if this is a clinical concern.\par No acute fracture or dislocation. No advanced arthritic changes.\par < end of copied text >\par \par < from: Xray Chest 2 Views PA/Lat (01.11.22 @ 19:18) >IMPRESSION: No acute pulmonary disease.\par < end of copied text >\par Assessment and Recommendation:- Assessment \par 57 y/o male with PMH of HTN, DM, hypogammaglobulinemia, and schizoaffective\par disorder presented to the hospital ED for pain and redness in left pointer\par finger. Patient states that about 5 days ago he noticed a "pimple" on the tip\par of his left pointer finger which was painful so he used a needle to drain it.\par Took 3 days of Augmentin but getting worse. X-ray with diffuse swelling of the\par index finger, no gross OM. ID consult for left index finger abscess. Plastic\par surgery did I&D and took the culture.\par \par #Left index finger abscess\par - s/p I&D by plastic\par - follow up pus culture and BCx\par - No need MRI given the infection was less than a week and X-ray did not show\par gross OM, however given his immunocompromised state of receiving monthly IVIG,\par he should follow up with ID clinic to make sure abscess resolved\par - Can change IV abx to PO doxycyline 100mg BID and Augmetin 875 mg po q 12 for7 days\par Prsoper Aguilar MD, [id number] fellowPager: [phone]\par [facility] pager ID: [phone] (would prefer to text page for any new consult or question,\par please include name/location and best call back number)\par After 5pm/weekends call [phone]\par d/w Dr. Moncada\par Recs conveyed to primary team\par Attending supervision statement: I have personally seen and examined the\par patient. I fully participated in the care of this patient. I have made\par amendments to the documentation where necessary, and agree with the history,\par physical exam, and plan as documented by the Fellow.\par 56 year old immunocompromised with CVIDwith finger abscess after biteS/o I and D\par Culture without growthCan change to Augmetin 875 po q 12 with doxycyline 100 q 12 for 7 more days.\par Doubt OM this soon - would hold off on MRI\par Can follow up as outpt with me [phone].\par Electronic Signatures:\par Prosper Aguilar) (Signed 12-Jan-2022 15:59)

## 2022-02-22 NOTE — ED BEHAVIORAL HEALTH ASSESSMENT NOTE - SUICIDE ATTEMPT:
Impression: Combined forms of age-related cataract, bilateral: H25.813. Plan: Cataract causing symptomatic  impairment of visual function not correctable with a tolerable change in glasses or contact lenses resulting in the patient's inability to function satisfactorily while performing Activities of Daily Life including, but not limited to reading, viewing television, driving, or meeting vocational or recreational needs. Cataracts account for the patient's complaints. Discussed all risks, benefits, procedures and recovery. Patient desires to have surgery. Recommend surgery OU,  first. Multifocal, toric lens/astigmatism correction, standard lens, LenSx, ORA, Aim OD:  plano Aim OS:plano Outcome of surgery limitations include:  glc 
atodaria please 
consider migs
Impression: Drusen (degenerative) of macula, bilateral: H35.363.  Plan: start areds 
macular oct
Impression: Primary open-angle glaucoma, mild stage, bilateral
-Pt denies family hx; Pt denies heart/lung problems;oc htn; denies sulfa allergy; denies sleep apnea/migranes; s/p LPI OU (12/16) Plan: iop improved , will determine goal may need to add another drops 
scheudule HVF consider  migs Continue Xalatan QHS OU 
tests reviewed
Impression: Puckering of macula, left eye: H35.372. Plan: Mild, insignificant to vision. Pt to monitor for any changes in vision and/or distortion. Contact us if any changes occur.
None known

## 2022-03-03 ENCOUNTER — APPOINTMENT (OUTPATIENT)
Dept: RHEUMATOLOGY | Facility: CLINIC | Age: 57
End: 2022-03-03
Payer: MEDICARE

## 2022-03-03 PROCEDURE — 96365 THER/PROPH/DIAG IV INF INIT: CPT

## 2022-03-03 PROCEDURE — 96366 THER/PROPH/DIAG IV INF ADDON: CPT

## 2022-03-09 ENCOUNTER — APPOINTMENT (OUTPATIENT)
Dept: WOUND CARE | Facility: CLINIC | Age: 57
End: 2022-03-09
Payer: MEDICARE

## 2022-03-09 VITALS
BODY MASS INDEX: 34.78 KG/M2 | HEART RATE: 86 BPM | TEMPERATURE: 97.6 F | SYSTOLIC BLOOD PRESSURE: 177 MMHG | HEIGHT: 74 IN | DIASTOLIC BLOOD PRESSURE: 101 MMHG | WEIGHT: 271 LBS

## 2022-03-09 DIAGNOSIS — T14.90XA INJURY, UNSPECIFIED, INITIAL ENCOUNTER: ICD-10-CM

## 2022-03-09 PROCEDURE — 99213 OFFICE O/P EST LOW 20 MIN: CPT

## 2022-03-11 RX ORDER — ALBUTEROL SULFATE 90 UG/1
108 (90 BASE) INHALANT RESPIRATORY (INHALATION)
Qty: 1 | Refills: 0 | Status: ACTIVE | COMMUNITY
Start: 2022-03-11 | End: 1900-01-01

## 2022-03-12 PROBLEM — T14.90XA CLOSED WOUND: Status: ACTIVE | Noted: 2022-03-12

## 2022-03-12 NOTE — DATA REVIEWED
[FreeTextEntry1] : Reason for Referral/Consultation: left index finger abscess\par - Reason for Admission left index finger abscess\par - Subjective and Objective:\par Patient is a 56y old Male who presents with a chief complaint of left index\par finger abscess (12 Jan 2022 07:58)\par HPI:57 y/o male with PMH of HTN, DM, hypogammaglobulinemia, and schizoaffective\par disorder presented to the hospital ED for pain and redness in left pointer\par finger. Patient states that about 5 days ago he noticed a "pimple" on the tip\par of his left pointer finger which was painful so he used a needle to drain it.\par He says the pain at its worst was a 8/10 and currently is now a 5/10 achy\par feeling. He was prescribed Augmentin and took the medication for 3 days. Over\par the next couple of days the redness at the tip of his finger traveled\par proximally to the base of his finger and the "pimple" he popped became filled\par with pus. He had difficulty flexing his finger and it became painful to\par palpation again. He went back to his primary care doctor who then prompted him\par to come to the ED. Additionally, patient noticed swelling in b/l feet and legs\par for 3 weeks. He was told by PMD it might be related to amlodipine but patient\par has been on for a year. He also has ran out of amlodipine for last two weeks.\par Denies fever, chills, nausea, HA, difficulty moving other fingers or wrist,\par pain traveling up arm, or expanding erythema past his left pointer finger, CP,\par SOB, orthopnea, redness in legs, pain in calves. In the ED patient was seen by\par plastic surgery, I&D'd, and bandaged. He had been given Tylenol 975 mg PO with\par minimal improvement.\par \par Patient is unsure of his med dosing but states he is on remeron, paxil, haldol\par injections. Ran out of amlodipine (12 Jan 2022 01:01)\par \par ID consult for left index finger abscess.\par It developed out of no-where 6-7 days ago, he did put it in warm water. No\par recent nail cut, finger bite or injury.\par He started to take Augmentin 3 days after.\par After taking 3 days of Augmentin, it's not improving so he was advised to come\par to ED.\par \par He denies hx of infection. But he had a boil on his back that popped and\par resolved on its own once.\par He had 2 doses of Moderna vaccine in 03/2021.\par \par REVIEW OF SYSTEMS\par [ ] ROS unobtainable because:\par [ X ] All other systems negative except as noted below: everything else is\par negative\par \par Constitutional: [ ] fever [ ] chills [ ] weight loss [ ]night sweat [ ]poor\par appetite/PO intake [ ]fatigue\par Skin: [ ] rash [ ] phlebitis \par Eyes: [ ] icterus [ ] pain [ ] discharge \par ENMT: [ ] sore throat [ ] thrush [ ] ulcers [ ] exudates [ ]anosmia\par Respiratory: [ ] dyspnea [ ] hemoptysis [ ] cough [ ] sputum \par Cardiovascular: [ ] chest pain [ ] palpitations [ ] edema \par Gastrointestinal: [ ] nausea [ ] vomiting [ ] diarrhea [ ] constipation [ ]\par pain \par Genitourinary: [ ] dysuria [ ] frequency [ ] hematuria [ ] discharge [ ] flank\par pain [ ] incontinence\par Musculoskeletal: [ ] myalgias [ ] arthralgias [ ] arthritis [ ] back pain\par Neurological: [ ] headache [ ] weakness [ ] seizures [ ] confusion/altered\par mental status\par \par prior hospital charts reviewed [V]\par primary team notes reviewed [V]\par other consultant notes reviewed [V]\par \par PAST MEDICAL & SURGICAL HISTORY:\par Bipolar 1 disorder\par IgG deficiency\par Smoker\par DM (diabetes mellitus)\par Dental caries\par HTN (hypertension)\par Hypertriglyceridemia\par Deviated septum\par Loss of teeth due to extraction All teeth due to dental carries\par \par \par SOCIAL HISTORY:\par - Smoker+ smoking 2 packs per day for 25 years\par - No EtOH\par - Used to do marijuana but not now\par - Lives alone\par \par FAMILY HISTORY:\par Family history of throat cancer (Father)\par Family history of leukemia (Mother)\par \par Allergies\par amoxicillin (Fever)\par penicillin (Rash)\par \par ANTIMICROBIALS:\par piperacillin/tazobactam IVPB.. 3.375 every 8 hours\par vancomycin IVPB 1500 every 12 hours\par \par ANTIMICROBIALS (past 90 days):\par MEDICATIONS (STANDING):\par piperacillin/tazobactam IVPB..\par  25 mL/Hr IV Intermittent (01-12-22 @ 12:53)\par  25 mL/Hr IV Intermittent (01-12-22 @ 04:23)\par \par piperacillin/tazobactam IVPB...\par  200 mL/Hr IV Intermittent (01-11-22 @ 19:14)\par \par vancomycin IVPB\par  300 mL/Hr IV Intermittent (01-12-22 @ 09:43)\par \par vancomycin IVPB.\par  250 mL/Hr IV Intermittent (01-11-22 @ 21:13)\par \par \par OTHER MEDS:\par MEDICATIONS (STANDING):\par acetaminophen Tablet .. 650 every 6 hours PRN\par dextrose 40% Gel 15 once\par dextrose 50% Injectable 25 once\par dextrose 50% Injectable 12.5 once\par dextrose 50% Injectable 25 once\par enoxaparin Injectable 40 two times a day\par glucagon Injectable 1 once\par hydrALAZINE 25 three times a day\par insulin lispro (ADMELOG) corrective regimen sliding scale three times a day\par before meals\par insulin lispro (ADMELOG) corrective regimen sliding scale at bedtime\par lisinopril 20 daily\par melatonin 6 at bedtime PRN\par \par \par VITALS:\par Vital Signs Last 24 Hrs\par T(F): 97.6 (01-12-22 @ 08:45), Max: 98.7 (01-12-22 @ 06:40)\par \par Vital Signs Last 24 Hrs\par HR: 66 (01-12-22 @ 10:40) (66 - 89)\par BP: 80/48 (01-12-22 @ 10:40) (80/48 - 192/113)\par RR: 18 (01-12-22 @ 10:40)\par SpO2: 100% (01-12-22 @ 10:40) (96% - 100%)\par Wt(kg): --Physical Exam:\par Constitutional: well preserved, comfortable\par Head/Eyes: no icterus, PERRL, EOMI\par ENT: supple; no thrush\par LUNGS: CTA\par CVS: normal S1, S2, no murmur\par Abd: soft, non-tender; non-distended\par Ext: no edema; left first finger redness and mild tenderness, in gauze\par Vascular: IV site no erythema tenderness or discharge\par MSK: joints without swelling\par Neuro: AAO X 3, non- focal\par \par Labs:\par \par  14.1\par 8.29 )-----------( 215 ( 12 Jan 2022 07:49 )\par  44.0\par \par 01-12\par \par 135 | 96<L> | 7\par ----------------------------< 128<H>\par 3.5 | 26 | 0.68\par \par Ca 8.9 12 Jan 2022 07:49\par Phos 3.2 01-12\par Mg 1.90 01-12\par \par TPro 7.4 / Alb 4.0 / TBili <0.2 / DBili x / AST 36 / ALT 28 /\par  AlkPhos 156<H> 01-11\par \par \par WBC Trend:\par WBC Count: 8.29 (01-12-22 @ 07:49)\par WBC Count: 8.56 (01-11-22 @ 18:53)\par \par Auto Neutrophil #: 5.57 K/uL (01-11-22 @ 18:53)\par \par Creatine Trend:\par Creatinine, Serum: 0.68 (01-12)\par Creatinine, Serum: 0.71 (01-11)\par \par Liver Biochemical Testing Trend:\par Alanine Aminotransferase (ALT/SGPT): 28 (01-11)\par Aspartate Aminotransferase (AST/SGOT): 36 (01-11-22 @ 18:53)\par Bilirubin Total, Serum: <0.2 (01-11)\par \par MICROBIOLOGY:\par \par COVID-19 PCR: NotDetec (01-11-22 @ 19:48)\par \par Sedimentation Rate, Erythrocyte: 34 mm/hr (01.12.22 @ 07:49)\par C-Reactive Protein, Serum: 29.4 (01-12)\par \par Serum Pro-Brain Natriuretic Peptide: 45 (01-12)\par \par A1C with Estimated Average Glucose Result: 6.4 % (01-12-22 @ 07:49)\par \par RADIOLOGY:\par imaging below personally reviewed\par \par < from: Xray Hand 3 Views, Left (01.11.22 @ 19:18) >\par IMPRESSION:\par Diffuse soft tissue swelling of the index finger. No gross radiographic\par evidence of osteomyelitis. MRI can be performed to more sensitively\par evaluate for osteomyelitis, if this is a clinical concern.\par No acute fracture or dislocation. No advanced arthritic changes.\par < end of copied text >\par \par < from: Xray Chest 2 Views PA/Lat (01.11.22 @ 19:18) >IMPRESSION: No acute pulmonary disease.\par < end of copied text >\par Assessment and Recommendation:- Assessment \par 57 y/o male with PMH of HTN, DM, hypogammaglobulinemia, and schizoaffective\par disorder presented to the hospital ED for pain and redness in left pointer\par finger. Patient states that about 5 days ago he noticed a "pimple" on the tip\par of his left pointer finger which was painful so he used a needle to drain it.\par Took 3 days of Augmentin but getting worse. X-ray with diffuse swelling of the\par index finger, no gross OM. ID consult for left index finger abscess. Plastic\par surgery did I&D and took the culture.\par \par #Left index finger abscess\par - s/p I&D by plastic\par - follow up pus culture and BCx\par - No need MRI given the infection was less than a week and X-ray did not show\par gross OM, however given his immunocompromised state of receiving monthly IVIG,\par he should follow up with ID clinic to make sure abscess resolved\par - Can change IV abx to PO doxycyline 100mg BID and Augmetin 875 mg po q 12 for7 days\par Prosper Aguilar MD, [id number] fellowPager: [phone]\par [facility] pager ID: [phone] (would prefer to text page for any new consult or question,\par please include name/location and best call back number)\par After 5pm/weekends call [phone]\par d/w Dr. Moncada\par Recs conveyed to primary team\par Attending supervision statement: I have personally seen and examined the\par patient. I fully participated in the care of this patient. I have made\par amendments to the documentation where necessary, and agree with the history,\par physical exam, and plan as documented by the Fellow.\par 56 year old immunocompromised with CVIDwith finger abscess after biteS/o I and D\par Culture without growthCan change to Augmetin 875 po q 12 with doxycyline 100 q 12 for 7 more days.\par Doubt OM this soon - would hold off on MRI\par Can follow up as outpt with me [phone].\par Electronic Signatures:\par Prosper Aguilar) (Signed 12-Jan-2022 15:59)

## 2022-03-12 NOTE — PHYSICAL EXAM
[Normal Breath Sounds] : Normal breath sounds [Normal Rate and Rhythm] : normal rate and rhythm [2+] : left 2+ [Skin Ulcer] : ulcer [Alert] : alert [Oriented to Time] : oriented to time [Calm] : calm [Please See PDF for Tissue Analytics] : Please See PDF for [employer]. [JVD] : no jugular venous distention  [Abdomen Tenderness] : ~T ~M No abdominal tenderness [de-identified] : nad [de-identified] : saurav [de-identified] : swollen finger left

## 2022-03-12 NOTE — PLAN
[FreeTextEntry1] : 2/18/22\par Plan - iodosorb ton be applied, cover with gauze and small roll gauze\par supplies ordered\par follow up 2-3 weeks\par \par 3/09/2022\par wound healed\par follow up as needed

## 2022-03-12 NOTE — ASSESSMENT
[FreeTextEntry1] : 55 y/o male with PMH of HTN, DM, hypogammaglobulinemia, and schizoaffective\par known to Dr Boxer\par disorder  left pointer  finger ulcer, s/p i/D, no gross OM.\par   Plastic surgery did I&D and took the culture. + staph \par  \par  datacomplexity- mod lab, xr, old rec, test resultsreview,, visualize imagecptreview previous\par risk- mod surgery mechanical debridement excess dry slough skin\par tolerated well\par SSD ordered\par dressing ordered\par \par \par 2/4/2022\par No clinical sign of infection\par Smoking cessation discussed\par Left Index open wound \par Area was cleaned, applied iodosorb\par Instructed to DC sulfadiazine\par Iodosorb and dressing ordered\par f/u in 2 weeks  \par \par 3/09/2022\par Left index wound healed, \par no swelling noted\par instructed to follow up if new wound arise\par smokking cessation disussed\par \par 2/18/22\par left index wound and right index wound \par still using silvadene\par left index finger, clean, almost healed\par right index finger, clean and pink, superficial\par No clinical sign of infection\par \par

## 2022-03-16 ENCOUNTER — APPOINTMENT (OUTPATIENT)
Dept: ALLERGY | Facility: CLINIC | Age: 57
End: 2022-03-16
Payer: MEDICARE

## 2022-03-16 VITALS
DIASTOLIC BLOOD PRESSURE: 80 MMHG | HEART RATE: 102 BPM | SYSTOLIC BLOOD PRESSURE: 150 MMHG | OXYGEN SATURATION: 94 % | TEMPERATURE: 98.1 F | RESPIRATION RATE: 17 BRPM

## 2022-03-16 PROCEDURE — 99214 OFFICE O/P EST MOD 30 MIN: CPT

## 2022-03-16 RX ORDER — AMOXICILLIN AND CLAVULANATE POTASSIUM 875; 125 MG/1; MG/1
875-125 TABLET, COATED ORAL
Qty: 20 | Refills: 0 | Status: ACTIVE | COMMUNITY
Start: 2022-03-16 | End: 1900-01-01

## 2022-03-16 NOTE — ASSESSMENT
[FreeTextEntry1] : CVID - \par \par Continue IVIG \par WIll arrange Euvisheld injection for COVID protection \par \par Moderate persistent asthma:\par \par Continue Breo 200 QD\par Continue albuterol QID prn \par \par Acute bronchitis:\par \par Augmentin 875 mg BID x 10 days\par \par Chronic tobacco abuse - patient resistant to quitting cigarettes \par \par RV 3 months

## 2022-03-16 NOTE — PHYSICAL EXAM
[Alert] : alert [No Acute Distress] : no acute distress [Normal Voice/Communication] : normal voice communication [No Neck Mass] : no neck mass was observed [No LAD] : no lymphadenopathy [Wheezing] : wheezing was heard [Normal Rate] : heart rate was normal  [Normal S1, S2] : normal S1 and S2 [No murmur] : no murmur [Regular Rhythm] : with a regular rhythm [Normal Cervical Lymph Nodes] : cervical [No Rash] : no rash [No clubbing] : no clubbing [No Edema] : no edema [No Cyanosis] : no cyanosis [de-identified] : obese  [de-identified] : scant exp wheeze  [de-identified] : unkempt male

## 2022-03-16 NOTE — SOCIAL HISTORY
[House] : [unfilled] lives in a house  [Cat] : cat [Single] : single [de-identified] : lives  [FreeTextEntry2] : on disability

## 2022-03-16 NOTE — HISTORY OF PRESENT ILLNESS
[de-identified] : Patient with increased coughing over the weekend - clear mucus - smoking 1 ppd - PCP and psychiatrist are both retiring - he was admitted to the hospital for finger infection - he popped a lesion on his finger with dirty needle - cleared with antibiotics.   He is taking Breo 200 QD and Ventolin QD.    He continues to cough.

## 2022-03-26 ENCOUNTER — APPOINTMENT (OUTPATIENT)
Age: 57
End: 2022-03-26

## 2022-03-26 ENCOUNTER — OUTPATIENT (OUTPATIENT)
Dept: OUTPATIENT SERVICES | Facility: HOSPITAL | Age: 57
LOS: 1 days | End: 2022-03-26
Payer: MEDICARE

## 2022-03-26 DIAGNOSIS — J34.2 DEVIATED NASAL SEPTUM: Chronic | ICD-10-CM

## 2022-03-26 DIAGNOSIS — D83.9 COMMON VARIABLE IMMUNODEFICIENCY, UNSPECIFIED: ICD-10-CM

## 2022-03-26 DIAGNOSIS — Z23 ENCOUNTER FOR IMMUNIZATION: ICD-10-CM

## 2022-03-26 DIAGNOSIS — K08.199 COMPLETE LOSS OF TEETH DUE TO OTHER SPECIFIED CAUSE, UNSPECIFIED CLASS: Chronic | ICD-10-CM

## 2022-03-31 ENCOUNTER — APPOINTMENT (OUTPATIENT)
Dept: RHEUMATOLOGY | Facility: CLINIC | Age: 57
End: 2022-03-31
Payer: MEDICARE

## 2022-03-31 PROCEDURE — 96365 THER/PROPH/DIAG IV INF INIT: CPT

## 2022-03-31 PROCEDURE — 96366 THER/PROPH/DIAG IV INF ADDON: CPT

## 2022-04-04 ENCOUNTER — APPOINTMENT (OUTPATIENT)
Dept: ALLERGY | Facility: CLINIC | Age: 57
End: 2022-04-04

## 2022-04-28 ENCOUNTER — APPOINTMENT (OUTPATIENT)
Dept: RHEUMATOLOGY | Facility: CLINIC | Age: 57
End: 2022-04-28
Payer: MEDICARE

## 2022-04-28 ENCOUNTER — APPOINTMENT (OUTPATIENT)
Dept: RHEUMATOLOGY | Facility: CLINIC | Age: 57
End: 2022-04-28

## 2022-04-28 PROCEDURE — 96366 THER/PROPH/DIAG IV INF ADDON: CPT

## 2022-04-28 PROCEDURE — 96365 THER/PROPH/DIAG IV INF INIT: CPT

## 2022-05-09 ENCOUNTER — APPOINTMENT (OUTPATIENT)
Dept: ALLERGY | Facility: CLINIC | Age: 57
End: 2022-05-09

## 2022-05-25 PROCEDURE — 99214 OFFICE O/P EST MOD 30 MIN: CPT | Mod: 95

## 2022-05-26 ENCOUNTER — APPOINTMENT (OUTPATIENT)
Dept: RHEUMATOLOGY | Facility: CLINIC | Age: 57
End: 2022-05-26
Payer: MEDICARE

## 2022-05-26 ENCOUNTER — APPOINTMENT (OUTPATIENT)
Dept: RHEUMATOLOGY | Facility: CLINIC | Age: 57
End: 2022-05-26

## 2022-05-26 PROCEDURE — 96365 THER/PROPH/DIAG IV INF INIT: CPT

## 2022-05-26 PROCEDURE — 96366 THER/PROPH/DIAG IV INF ADDON: CPT

## 2022-06-23 ENCOUNTER — APPOINTMENT (OUTPATIENT)
Dept: RHEUMATOLOGY | Facility: CLINIC | Age: 57
End: 2022-06-23

## 2022-06-23 ENCOUNTER — APPOINTMENT (OUTPATIENT)
Dept: RHEUMATOLOGY | Facility: CLINIC | Age: 57
End: 2022-06-23
Payer: MEDICARE

## 2022-06-23 VITALS
BODY MASS INDEX: 34.79 KG/M2 | TEMPERATURE: 98.2 F | SYSTOLIC BLOOD PRESSURE: 173 MMHG | RESPIRATION RATE: 17 BRPM | DIASTOLIC BLOOD PRESSURE: 102 MMHG | HEART RATE: 90 BPM | OXYGEN SATURATION: 95 % | WEIGHT: 271 LBS

## 2022-06-23 VITALS
HEART RATE: 100 BPM | RESPIRATION RATE: 20 BRPM | DIASTOLIC BLOOD PRESSURE: 95 MMHG | SYSTOLIC BLOOD PRESSURE: 170 MMHG | OXYGEN SATURATION: 97 %

## 2022-06-23 PROCEDURE — 96366 THER/PROPH/DIAG IV INF ADDON: CPT

## 2022-06-23 PROCEDURE — 96365 THER/PROPH/DIAG IV INF INIT: CPT

## 2022-06-23 PROCEDURE — 36415 COLL VENOUS BLD VENIPUNCTURE: CPT

## 2022-06-23 RX ORDER — IMMUNE GLOBULIN INTRAVENOUS (HUMAN) 10 G
10 KIT INTRAVENOUS
Qty: 0 | Refills: 0 | Status: COMPLETED | OUTPATIENT
Start: 2022-06-17

## 2022-06-23 RX ORDER — ACETAMINOPHEN 325 MG/1
325 TABLET ORAL
Qty: 0 | Refills: 0 | Status: COMPLETED | OUTPATIENT
Start: 2022-06-17

## 2022-06-23 RX ORDER — DIPHENHYDRAMINE HCL 25 MG/1
25 TABLET ORAL
Qty: 0 | Refills: 0 | Status: COMPLETED | OUTPATIENT
Start: 2022-06-17

## 2022-06-29 ENCOUNTER — NON-APPOINTMENT (OUTPATIENT)
Age: 57
End: 2022-06-29

## 2022-07-09 ENCOUNTER — APPOINTMENT (OUTPATIENT)
Age: 57
End: 2022-07-09

## 2022-07-09 ENCOUNTER — OUTPATIENT (OUTPATIENT)
Dept: OUTPATIENT SERVICES | Facility: HOSPITAL | Age: 57
LOS: 1 days | End: 2022-07-09

## 2022-07-09 VITALS
OXYGEN SATURATION: 92 % | RESPIRATION RATE: 20 BRPM | HEART RATE: 84 BPM | TEMPERATURE: 98 F | SYSTOLIC BLOOD PRESSURE: 164 MMHG | DIASTOLIC BLOOD PRESSURE: 82 MMHG

## 2022-07-09 VITALS
TEMPERATURE: 98 F | SYSTOLIC BLOOD PRESSURE: 175 MMHG | HEART RATE: 84 BPM | OXYGEN SATURATION: 92 % | RESPIRATION RATE: 20 BRPM | DIASTOLIC BLOOD PRESSURE: 97 MMHG

## 2022-07-09 DIAGNOSIS — J34.2 DEVIATED NASAL SEPTUM: Chronic | ICD-10-CM

## 2022-07-09 DIAGNOSIS — K08.199 COMPLETE LOSS OF TEETH DUE TO OTHER SPECIFIED CAUSE, UNSPECIFIED CLASS: Chronic | ICD-10-CM

## 2022-07-09 DIAGNOSIS — D83.9 COMMON VARIABLE IMMUNODEFICIENCY, UNSPECIFIED: ICD-10-CM

## 2022-07-09 DIAGNOSIS — Z23 ENCOUNTER FOR IMMUNIZATION: ICD-10-CM

## 2022-07-21 ENCOUNTER — APPOINTMENT (OUTPATIENT)
Dept: RHEUMATOLOGY | Facility: CLINIC | Age: 57
End: 2022-07-21

## 2022-07-21 VITALS — SYSTOLIC BLOOD PRESSURE: 166 MMHG | DIASTOLIC BLOOD PRESSURE: 91 MMHG | HEART RATE: 76 BPM | OXYGEN SATURATION: 95 %

## 2022-07-21 VITALS
OXYGEN SATURATION: 98 % | DIASTOLIC BLOOD PRESSURE: 96 MMHG | HEART RATE: 81 BPM | SYSTOLIC BLOOD PRESSURE: 157 MMHG | WEIGHT: 287.8 LBS | BODY MASS INDEX: 36.95 KG/M2 | TEMPERATURE: 98.5 F

## 2022-07-21 PROCEDURE — 96365 THER/PROPH/DIAG IV INF INIT: CPT

## 2022-07-21 PROCEDURE — 96366 THER/PROPH/DIAG IV INF ADDON: CPT

## 2022-07-21 RX ORDER — DIPHENHYDRAMINE HCL 25 MG/1
25 TABLET ORAL
Qty: 0 | Refills: 0 | Status: COMPLETED | OUTPATIENT
Start: 2022-07-15

## 2022-07-21 RX ORDER — ACETAMINOPHEN 325 MG/1
325 TABLET ORAL
Qty: 0 | Refills: 0 | Status: COMPLETED | OUTPATIENT
Start: 2022-07-15

## 2022-07-21 RX ORDER — IMMUNE GLOBULIN INTRAVENOUS (HUMAN) 10 G
10 KIT INTRAVENOUS
Qty: 0 | Refills: 0 | Status: COMPLETED | OUTPATIENT
Start: 2022-07-15

## 2022-07-28 NOTE — ED PROVIDER NOTE - PROGRESS NOTE DETAILS
Pt called in to make an appt with Dr Pedraza.    Pt is having dizziness, right ear ache and runny nose since last week. Pt has had this before, last time pt had this was 3-4 yrs ago and was seeing ENT for this. Pt was diagnosed with Vertigo and was told he had other inner ear issues. What they recommended was low sodium diet and plug in ear while taking a shower or swimming but pt has not been doing that for the last year. Pt did took BP this morning and it was 118/70.     Pt wanted to be seen today but told him Dr Pedraza is not in today and offer appt tomorrow but was not sure if Dr Pedraza will come tomorrow. [facility] is fully booked for tomorrow. Pt does not wanted to risk tomorrow if Dr Pedraza will call off. Pt wants to be seen today instead.    Please advise.  
Pt stated he has an ear ache, runny nose and dizziness. Pt stated he has had vertigo before with these similar symptoms. Pt stated his BP have been in his baseline range 120/78. Pt denies any weakness, visual disturbances, slurred speech, v/m or fever. Offer patient a appointment for tomorrow pt declined. Advised patient if he could not wait to be seen to go to [facility] patient verbalized understanding agrees to plan of care.  
Dr Majano: This patient was not seen by me nor discussed with me. I was available for consultation from the PA/NP but was not approached. I signed the chart per hospital policy.

## 2022-08-03 ENCOUNTER — EMERGENCY (EMERGENCY)
Facility: HOSPITAL | Age: 57
LOS: 1 days | Discharge: HOME | End: 2022-08-03
Attending: EMERGENCY MEDICINE | Admitting: EMERGENCY MEDICINE

## 2022-08-03 VITALS
TEMPERATURE: 98 F | DIASTOLIC BLOOD PRESSURE: 76 MMHG | SYSTOLIC BLOOD PRESSURE: 146 MMHG | OXYGEN SATURATION: 92 % | HEART RATE: 64 BPM | RESPIRATION RATE: 17 BRPM

## 2022-08-03 VITALS
RESPIRATION RATE: 16 BRPM | HEIGHT: 74 IN | SYSTOLIC BLOOD PRESSURE: 150 MMHG | OXYGEN SATURATION: 95 % | TEMPERATURE: 98 F | HEART RATE: 69 BPM | DIASTOLIC BLOOD PRESSURE: 93 MMHG

## 2022-08-03 DIAGNOSIS — J34.2 DEVIATED NASAL SEPTUM: Chronic | ICD-10-CM

## 2022-08-03 DIAGNOSIS — K08.199 COMPLETE LOSS OF TEETH DUE TO OTHER SPECIFIED CAUSE, UNSPECIFIED CLASS: Chronic | ICD-10-CM

## 2022-08-03 PROCEDURE — 99283 EMERGENCY DEPT VISIT LOW MDM: CPT | Mod: GC

## 2022-08-03 PROCEDURE — 73590 X-RAY EXAM OF LOWER LEG: CPT | Mod: 26,LT

## 2022-08-03 NOTE — ED PROVIDER NOTE - PHYSICAL EXAMINATION
gen: well appearing  Mentation: AAO x 3  psych: mood appropriate  HEENT: airway patent, conjunctivae clear bilaterally  Cardio: RRR, no m/r/g  Resp: normal BS b/l  GI: soft/nondistended/nontender  : no CVA tenderness  Neuro: sensation and motor function grossly intact  Skin: 2x2 cm ulcer on left lateral lower leg with raised borders without purulent drainage, erythema, tenderness  MSK: normal movement of all extremities  Lymph/Vasc: no LE edema

## 2022-08-03 NOTE — ED PROVIDER NOTE - OBJECTIVE STATEMENT
56 year old male with a PMHx of CVID, HTN, HLD, diabetes presenting with ulcer. Patient went to [facility] this morning for monthly haldol injection, nurse noted ulcer on left lateral lower leg, sent to ED. Patient states that he has had this ulcer for 30 years. Sometimes it oozes clear fluid and he picks at it. Only hurts if he touches it. Denies purulent drainage, fevers, weakness or numbness, nausea, vomiting. 56 year old male with a PMHx of CVID, HTN, HLD, diabetes presenting with ulcer. Patient went to [facility] this morning for monthly haldol injection, nurse noted ulcer on left lateral lower leg, sent to ED. Patient states that he has had this ulcer for 30 years. Sometimes it oozes clear fluid and he picks at it. Only hurts if he touches it. Patient states that he had it biopsied 10 year ago and it was non-cancerous. Denies purulent drainage, fevers, weakness or numbness, nausea, vomiting.

## 2022-08-03 NOTE — ED PROVIDER NOTE - PATIENT PORTAL LINK FT
You can access the FollowMyHealth Patient Portal offered by [facility] by registering at the following website: [email or web address]. By joining "[employer]’s FollowMyHealth portal, you will also be able to view your health information using other applications (apps) compatible with our system.

## 2022-08-03 NOTE — ED PROVIDER NOTE - NSFOLLOWUPCLINICS_GEN_ALL_ED_FT
Health system Dermatology - [place]  [facility]  [address], [address]  [place] [address]  Phone: [phone]  Fax: [phone]  Follow Up Time: 4-6 Days

## 2022-08-03 NOTE — ED PROVIDER NOTE - ATTENDING CONTRIBUTION TO CARE
agree with resident note    "56 year old male with a PMHx of CVID, HTN, HLD, diabetes presenting with ulcer. Patient went to [facility] this morning for monthly haldol injection, nurse noted ulcer on left lateral lower leg, sent to ED. Patient states that he has had this ulcer for 30 years. Sometimes it oozes clear fluid and he picks at it. Only hurts if he touches it. Patient states that he had it biopsied 10 year ago and it was non-cancerous. Denies purulent drainage, fevers, weakness or numbness, nausea, vomiting."    pt states long standing hx of this ulcer; has been biopsied along time ago per patient and told not malignant    PE: well appearing; VSS; CTAB/L; s1 s2 no m/r/g skin: left lower leg 5x4 cm ulcerated wound with multiple stages, colors, no active discharge    imp: leg wound; pt states biopsy negative but appears to be a growth and has concerning features for malignancy (melanoma); will make derm appt for biopsy; have expressed my concern to pt that needs to follow up and he understands

## 2022-08-03 NOTE — ED ADULT NURSE NOTE - NS ED NURSE RECORD ANOTHER VITAL SIGN
Routing refill request to provider for review/approval because:  Drug not on the [facility] refill protocol     MICHAEL Madsen           Yes

## 2022-08-03 NOTE — ED PROVIDER NOTE - PROGRESS NOTE DETAILS
Ulcer does not appear to be infected, xray does not suggest OM. DC with dermatology follow up. DO Cesar (PGY-2)

## 2022-08-03 NOTE — ED PROVIDER NOTE - CLINICAL SUMMARY MEDICAL DECISION MAKING FREE TEXT BOX
56 year old male with a PMHx of CVID, HTN, HLD, diabetes presenting with ulcer 56 year old male with a PMHx of CVID, HTN, HLD, diabetes presenting with ulcer that has been present for 30 years without purulent drainage, erythema, drainage, fevers, nausea, vomiting. Xray, wrapping in xeroform, follow up with dermatology.

## 2022-08-03 NOTE — ED ADULT NURSE NOTE - CHIEF COMPLAINT QUOTE
Pt sent from [facility] in clinic with L leg diabetic ulcer .  Pt seen  for monthly haldol injection. sent by RN   Denies SI, HI, hallucination, chills

## 2022-08-03 NOTE — ED ADULT TRIAGE NOTE - HEIGHT IN CM
187.96
35 yr old female with no hx presents to ed c/o lower abd pain with nausea and fever at 2p. +dysuria, no vag bleeding, no vomiting, no sti sx.

## 2022-08-03 NOTE — ED ADULT TRIAGE NOTE - CHIEF COMPLAINT QUOTE
Pt sent from [facility] walk in clinic with R knee area swelling /pain .  Pt seen  for monthly haldol injection.  Denies SI, HI, hallucination Pt sent from [facility] in clinic with L leg diabetic ulcer .  Pt seen  for monthly haldol injection. sent by RN   Denies SI, HI, hallucination, chills

## 2022-08-03 NOTE — ED ADULT NURSE NOTE - OBJECTIVE STATEMENT
Pt referred in from [facility] ( monthly Haldol IM) for eval of l foot diabetic ulcer, pt reports he has had this for over 30 years, but has been get worse because he picks at it. had negative cancer bx 10 years ago. + swelling, denies drainage or redness.

## 2022-08-03 NOTE — ED PROVIDER NOTE - NSFOLLOWUPINSTRUCTIONS_ED_ALL_ED_FT
Please follow up with a dermatologist within the next 4-6 days.    Please return to the emergency department if you experience any of the following symptoms:    Fever  Chest pain  Difficulty breathing  Abdominal pain  Nausea  Vomiting       Chronic Wounds    WHAT YOU NEED TO KNOW:    What is a chronic wound? A chronic wound is a wound that does not heal completely in 6 weeks. A wound is an injury that causes a break in the skin. There may also be damage to nearby tissues. Examples of wounds that can become chronic are deep ulcers (open sores), large burns, and infected cuts.    What leads to a chronic wound? Conditions that slow or stop the healing process may lead to a chronic wound. These may include any of the following:   •Poor blood supply or low oxygen can be caused by low blood pressure, or blocked or narrowed blood vessels. This is more likely if you smoke or have heart or blood vessel disease. Blood, heart, kidney, and lung disease can also decrease the oxygen supply to tissues.       •An infection occurs when bacteria get into your wound. Objects in the wound, such as glass or metal, may bring bacteria into your wound. Dead tissue in your wound may give bacteria a place to grow. Diseases such as diabetes can also increase your risk for infection.      •A weak immune system may lead to a chronic wound. Radiation treatments, poor nutrition, and certain medicines, such as steroids, weaken the immune system. Diseases such as cancer and diabetes can weaken your immune system and make it hard to fight an infection.       •Swelling in the tissues around your wound can cause pressure that decreases blood flow to the area. Tissue swelling may happen with traumatic injuries. It can also happen with conditions that cause decreased blood flow to the area, such as heart failure or blood vessel problems.       What signs and symptoms may happen with a chronic wound?   •Fever      •The area around your wound is red and warm to the touch      •Milky, yellow, green, or brown pus in the wound       •Bleeding, swelling, or pain in the affected area      •Trouble moving the affected area      •Wound has become larger or deeper      •Dark or black skin around the wound       How is a chronic wound diagnosed? Your healthcare provider will ask about your wound. He or she will ask about your health, the medicines you take, and any past surgeries. He or she will examine the wound and the area around it. Your healthcare provider will check to see how deep the wound is and look for signs of infection. You may also need any of the following:   •Blood tests are done to see if you have an infection.       •A wound culture is a test of fluid or tissue used to find the cause of your infection.       •An x-ray is a picture of your bones and tissues in the wound area. You may need to have an x-ray if the wound is near a joint or bone. Healthcare providers look for broken bones, or foreign objects such as glass or metal.      How is a chronic wound treated? Your treatment depends on where your wound is located and how severe it is. If a medical condition such as diabetes is delaying wound healing, it is important to treat the condition. Healthcare providers may change your treatment over time if your wound still does not heal. Your treatment may also change as your wound heals. You may need any of the following:   •Antibiotics may be given to prevent or treat an infection caused by bacteria.      •Wound care: ?Cleansing is done by flushing the wound with sterile fluid. Healthcare providers may use a large syringe with a needle or catheter (tube) tip. They may also use a liquid that kills germs.       ?Debridement is done to remove anything from the wound that can delay healing and lead to infection. This includes dead tissue, and objects such as small rocks and dirt. Your healthcare provider may cut out the damaged areas in or around the wound. He or she may also drain the wound to clean out pus. Moist bandages may be placed inside the wound, or bandages that contain enzymes may be used. Hydrotherapy (whirlpool treatment) uses water to clean wounds. It may be used to clean and debride burn wounds.      ?Dressings are used to protect the wound and promote wound healing. Many kinds of dressings can be used for chronic wounds. An elastic bandage may be wrapped around the wound area to put light pressure on it. The light pressure helps to decrease swelling in tissues around the wound area. Dressings may be in the form of bandages, gauze, films, gels, or foams. They may contain substances to help your wound heal faster.       ?Negative pressure wound therapy (NPWT) may also be done. This therapy is also called wound vacuum, or wound vac therapy. A vacuum device uses suction to remove fluid and waste from your wound and pull the edges closer together. NPWT may also increase blood flow and new tissue growth in the wound.       •Hyperbaric oxygen therapy is used to get more oxygen into the area of the wound. The oxygen is given under pressure inside of a hyperbaric or pressure chamber. You may need to have this therapy more than once.      What do I need to know about wound care?   •Wash your hands before and after you take care of your wound.      • Keep the bandage clean and dry. Do not stop using the bandage on your wound unless your healthcare provider says it is okay.      •Clean the wound and change the dressing as often as directed by your healthcare provider.       What can I do to help my wound heal?   •Eat healthy foods and drink liquids as directed. Healthy foods give your body the nutrients it needs to heal your wound. Liquids prevent dehydration that can decrease the blood supply to your wound. Healthy foods include fruits, vegetables, grains (breads and cereals), dairy, and protein foods. Protein foods include meat, fish, nuts, and soy products. Protein, calories, vitamin C, and zinc help wounds heal. Ask your healthcare provider for more information about the foods you should eat to improve healing.       •Do not smoke. If you smoke, it is never too late to quit. Smoking delays wound healing. Smoking also increases your risk for infection after surgery. Ask your healthcare provider for information if you need help quitting.      How can I prevent wounds caused by pressure? Pressure wounds can develop when blood flow to an area is blocked. For example, you sit or lie in the same position without moving and put pressure on your heels. You can prevent pressure wounds by doing any of the following:  •Change your position every 15 minutes while you are sitting.       •Change your position every 2 hours while you lie in bed.      •Prop your legs on pillows to lift your heels while you are lying down.      •Check your skin or have someone else check your skin daily. Check the areas that are common to pressure wounds, such as elbows, heels, and buttocks. Common early signs of pressure wounds are open sores, blisters, or changes in color or temperature.      When should I contact my healthcare provider?   •You have a fever.       •You have increased or new pain, swelling, redness, or bleeding in or around your wound.      •You have pus or a foul odor coming from your wound.      •Your skin itches or has a rash.      •You have open sores, blisters, or changes in the color or temperature of your skin.       •You have questions or concerns about your condition or care.

## 2022-08-18 ENCOUNTER — APPOINTMENT (OUTPATIENT)
Dept: RHEUMATOLOGY | Facility: CLINIC | Age: 57
End: 2022-08-18

## 2022-08-18 VITALS
RESPIRATION RATE: 20 BRPM | OXYGEN SATURATION: 97 % | DIASTOLIC BLOOD PRESSURE: 92 MMHG | SYSTOLIC BLOOD PRESSURE: 170 MMHG | HEART RATE: 78 BPM

## 2022-08-18 VITALS
TEMPERATURE: 98.2 F | OXYGEN SATURATION: 97 % | HEART RATE: 71 BPM | DIASTOLIC BLOOD PRESSURE: 98 MMHG | SYSTOLIC BLOOD PRESSURE: 163 MMHG

## 2022-08-18 PROCEDURE — 96366 THER/PROPH/DIAG IV INF ADDON: CPT

## 2022-08-18 PROCEDURE — 96365 THER/PROPH/DIAG IV INF INIT: CPT

## 2022-08-18 RX ORDER — IMMUNE GLOBULIN INTRAVENOUS (HUMAN) 10 G
10 KIT INTRAVENOUS
Qty: 0 | Refills: 0 | Status: COMPLETED | OUTPATIENT
Start: 2022-08-12

## 2022-08-18 RX ORDER — ACETAMINOPHEN 325 MG/1
325 TABLET ORAL
Qty: 0 | Refills: 0 | Status: COMPLETED | OUTPATIENT
Start: 2022-08-12

## 2022-08-18 RX ORDER — DIPHENHYDRAMINE HCL 25 MG/1
25 TABLET ORAL
Qty: 0 | Refills: 0 | Status: COMPLETED | OUTPATIENT
Start: 2022-08-12

## 2022-08-19 ENCOUNTER — EMERGENCY (EMERGENCY)
Facility: HOSPITAL | Age: 57
LOS: 1 days | Discharge: HOME | End: 2022-08-19
Attending: STUDENT IN AN ORGANIZED HEALTH CARE EDUCATION/TRAINING PROGRAM
Payer: MEDICARE

## 2022-08-19 VITALS
OXYGEN SATURATION: 98 % | DIASTOLIC BLOOD PRESSURE: 77 MMHG | WEIGHT: 300.05 LBS | SYSTOLIC BLOOD PRESSURE: 197 MMHG | RESPIRATION RATE: 16 BRPM | HEIGHT: 74 IN | TEMPERATURE: 98 F | HEART RATE: 79 BPM

## 2022-08-19 DIAGNOSIS — J34.2 DEVIATED NASAL SEPTUM: Chronic | ICD-10-CM

## 2022-08-19 DIAGNOSIS — K08.199 COMPLETE LOSS OF TEETH DUE TO OTHER SPECIFIED CAUSE, UNSPECIFIED CLASS: Chronic | ICD-10-CM

## 2022-08-19 PROCEDURE — 99283 EMERGENCY DEPT VISIT LOW MDM: CPT

## 2022-08-19 PROCEDURE — 99282 EMERGENCY DEPT VISIT SF MDM: CPT

## 2022-08-19 NOTE — ED ADULT NURSE NOTE - OBJECTIVE STATEMENT
Patient presented to the ED with c/o pain to the left leg down to the heel when walking and diabetic ulcer to lateral aspect of left shin. Denies any c/o pain.

## 2022-08-19 NOTE — ED PROVIDER NOTE - NSFOLLOWUPINSTRUCTIONS_ED_ALL_ED_FT
You were seen in the emergency room today. Please call your primary doctor to inform them of this ER visit and obtain the next available appointment within the next 5 days. As we discussed, return to the ER if you have any worsening symptoms.    We no longer feel that you need further emergency care or admission to the hospital at this time.    While we have determined that you are currently stable for discharge, we know that things can change. Please seek immediate medical attention or return to the ER if you experience any of the following:  Any worsening or persistent symptoms  Severe Pain  Chest Pain  Difficulty Breathing  Bleeding  Passing Out  Severe Rash  Inability to Eat or Drink  Persistent Fever    Please see a primary care doctor or specialist within 5 days to ensure that you are improving.    Please call the [facility] phone numbers on this document if you have any problems obtaining a follow up appointment.    I wish you well! -Dr Reza

## 2022-08-19 NOTE — ED PROVIDER NOTE - OBJECTIVE STATEMENT
57 yo M h/o HTN p/w bilat foot pain that has resolved and now only requesting food. Pt states that he has a place to stay (apartment) and was waiting at a busstop when EMS picked him up. Pt states that he feels well and denies any pain or other acute symptoms. Pt denies recent fever or infectious symptoms/ N/V/ diarrhea, dysuria or frequency/hesitancy, chest pain, SOB, focal numbness/weakness, syncope, other recent illness or hospitalizations.

## 2022-08-19 NOTE — ED PROVIDER NOTE - PATIENT PORTAL LINK FT
You can access the FollowMyHealth Patient Portal offered by [facility] by registering at the following website: [email or web address]. By joining [employer]’s FollowMyHealth portal, you will also be able to view your health information using other applications (apps) compatible with our system.

## 2022-08-19 NOTE — ED PROVIDER NOTE - PHYSICAL EXAMINATION
Vital Signs Reviewed - hypertensive  GEN: Comfortable, NAD, AAOx3  HEENT: NCAT, MMM, Neck Supple  RESP: CTAB, No rales/rhonchi/wheezing  CV: RRR, S1S2, No murmurs  ABD: No TTP, Soft, ND, No masses, No CVA Tenderness  Extrem/Skin: Equal pulses bilat, No cyanosis/edema/rashes  Neuro: No focal deficits

## 2022-08-19 NOTE — ED PROVIDER NOTE - CLINICAL SUMMARY MEDICAL DECISION MAKING FREE TEXT BOX
Pt p/w request for food stating that he no longer has any pain or other symptoms. Hypertensive and otw benign exam. Pt only wants food and then d/c. Rec PMD f/u ASAP. Most likely non emergent etiology of symptoms- the details of the case, history, and exam make more emergent diagnoses much less likely. Discussed with pt my clinical impression and results, patient given strict return precautions if persistent or worsening of symptoms occurs, and need for close follow up. Pt expressed understanding and agrees with plan. Pt is well appearing with a reassuring exam. Discharge home with PMD or Specialist f/u within 5 days.
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
## 2022-09-08 ENCOUNTER — APPOINTMENT (OUTPATIENT)
Dept: DERMATOLOGY | Facility: CLINIC | Age: 57
End: 2022-09-08

## 2022-09-14 ENCOUNTER — APPOINTMENT (OUTPATIENT)
Dept: ALLERGY | Facility: CLINIC | Age: 57
End: 2022-09-14

## 2022-09-14 VITALS
OXYGEN SATURATION: 95 % | TEMPERATURE: 98.8 F | WEIGHT: 300 LBS | DIASTOLIC BLOOD PRESSURE: 84 MMHG | HEART RATE: 83 BPM | SYSTOLIC BLOOD PRESSURE: 150 MMHG | HEIGHT: 74 IN | BODY MASS INDEX: 38.5 KG/M2

## 2022-09-14 PROCEDURE — 99214 OFFICE O/P EST MOD 30 MIN: CPT

## 2022-09-14 NOTE — HISTORY OF PRESENT ILLNESS
[de-identified] : Patient with gradual lowering of IgG level and dosage of Gammagard has been increased based upon patient's weight gain.    Patient has received his Evusheld.   He continues to smoke - 1 ppd - he feels some SOB with walking.    He has not had COVID.

## 2022-09-14 NOTE — SOCIAL HISTORY
[de-identified] : lives  [FreeTextEntry2] : on disability [House] : [unfilled] lives in a house  [Cat] : cat [Single] : single

## 2022-09-14 NOTE — ASSESSMENT
[FreeTextEntry1] : CVID - \par \par Continue Gammagard IVIG infusion\par Recheck IgG level - today will be his trough level \par \par Moderate persistent asthma:\par \par Continue albuterol QID prn \par \par Chronic tobacco abuse - patient resistant to quitting cigarettes \par \par RV 3 months

## 2022-09-14 NOTE — PHYSICAL EXAM
[Alert] : alert [No Acute Distress] : no acute distress [Normal Voice/Communication] : normal voice communication [No Neck Mass] : no neck mass was observed [No LAD] : no lymphadenopathy [Wheezing] : wheezing was heard [Normal Rate] : heart rate was normal  [Normal S1, S2] : normal S1 and S2 [No murmur] : no murmur [Regular Rhythm] : with a regular rhythm [Normal Cervical Lymph Nodes] : cervical [No Rash] : no rash [No clubbing] : no clubbing [No Edema] : no edema [No Cyanosis] : no cyanosis [de-identified] : obese  [de-identified] : scant exp wheeze  [de-identified] : unkempt male

## 2022-09-15 ENCOUNTER — APPOINTMENT (OUTPATIENT)
Dept: RHEUMATOLOGY | Facility: CLINIC | Age: 57
End: 2022-09-15

## 2022-09-15 VITALS
HEART RATE: 73 BPM | RESPIRATION RATE: 16 BRPM | DIASTOLIC BLOOD PRESSURE: 74 MMHG | SYSTOLIC BLOOD PRESSURE: 153 MMHG | OXYGEN SATURATION: 97 %

## 2022-09-15 VITALS
HEART RATE: 72 BPM | RESPIRATION RATE: 16 BRPM | OXYGEN SATURATION: 95 % | DIASTOLIC BLOOD PRESSURE: 81 MMHG | SYSTOLIC BLOOD PRESSURE: 150 MMHG | TEMPERATURE: 97.7 F

## 2022-09-15 LAB — IGG SER QL IEP: 648 MG/DL

## 2022-09-15 PROCEDURE — 96365 THER/PROPH/DIAG IV INF INIT: CPT

## 2022-09-15 PROCEDURE — 96366 THER/PROPH/DIAG IV INF ADDON: CPT

## 2022-09-15 RX ORDER — DIPHENHYDRAMINE HCL 25 MG/1
25 TABLET ORAL
Qty: 0 | Refills: 0 | Status: COMPLETED | OUTPATIENT
Start: 2022-09-09

## 2022-09-15 RX ORDER — IMMUNE GLOBULIN INTRAVENOUS (HUMAN) 10 G
10 KIT INTRAVENOUS
Qty: 0 | Refills: 0 | Status: COMPLETED | OUTPATIENT
Start: 2022-09-09

## 2022-09-15 RX ORDER — ACETAMINOPHEN 325 MG/1
325 TABLET ORAL
Qty: 0 | Refills: 0 | Status: COMPLETED | OUTPATIENT
Start: 2022-09-09

## 2022-09-22 NOTE — ED BEHAVIORAL HEALTH ASSESSMENT NOTE - NS ED BHA PLAN TR REFERRANT YN
Detail Level: Detailed Quality 110: Preventive Care And Screening: Influenza Immunization: Influenza Immunization previously received during influenza season Yes

## 2022-10-04 ENCOUNTER — EMERGENCY (EMERGENCY)
Facility: HOSPITAL | Age: 57
LOS: 1 days | Discharge: HOME | End: 2022-10-04
Attending: EMERGENCY MEDICINE | Admitting: EMERGENCY MEDICINE

## 2022-10-04 VITALS
HEART RATE: 76 BPM | RESPIRATION RATE: 17 BRPM | DIASTOLIC BLOOD PRESSURE: 76 MMHG | SYSTOLIC BLOOD PRESSURE: 144 MMHG | OXYGEN SATURATION: 97 % | TEMPERATURE: 98 F

## 2022-10-04 VITALS
HEIGHT: 74 IN | DIASTOLIC BLOOD PRESSURE: 76 MMHG | HEART RATE: 80 BPM | RESPIRATION RATE: 18 BRPM | OXYGEN SATURATION: 96 % | TEMPERATURE: 98 F | SYSTOLIC BLOOD PRESSURE: 130 MMHG

## 2022-10-04 DIAGNOSIS — K08.199 COMPLETE LOSS OF TEETH DUE TO OTHER SPECIFIED CAUSE, UNSPECIFIED CLASS: Chronic | ICD-10-CM

## 2022-10-04 DIAGNOSIS — J34.2 DEVIATED NASAL SEPTUM: Chronic | ICD-10-CM

## 2022-10-04 LAB
ALBUMIN SERPL ELPH-MCNC: 3.8 G/DL — SIGNIFICANT CHANGE UP (ref 3.3–5)
ALP SERPL-CCNC: 131 U/L — HIGH (ref 40–120)
ALT FLD-CCNC: 28 U/L — SIGNIFICANT CHANGE UP (ref 4–41)
ANION GAP SERPL CALC-SCNC: 10 MMOL/L — SIGNIFICANT CHANGE UP (ref 7–14)
AST SERPL-CCNC: 23 U/L — SIGNIFICANT CHANGE UP (ref 4–40)
BASOPHILS # BLD AUTO: 0.04 K/UL — SIGNIFICANT CHANGE UP (ref 0–0.2)
BASOPHILS NFR BLD AUTO: 0.5 % — SIGNIFICANT CHANGE UP (ref 0–2)
BILIRUB SERPL-MCNC: 0.3 MG/DL — SIGNIFICANT CHANGE UP (ref 0.2–1.2)
BUN SERPL-MCNC: 13 MG/DL — SIGNIFICANT CHANGE UP (ref 7–23)
CALCIUM SERPL-MCNC: 9.3 MG/DL — SIGNIFICANT CHANGE UP (ref 8.4–10.5)
CHLORIDE SERPL-SCNC: 93 MMOL/L — LOW (ref 98–107)
CO2 SERPL-SCNC: 26 MMOL/L — SIGNIFICANT CHANGE UP (ref 22–31)
CREAT SERPL-MCNC: 0.87 MG/DL — SIGNIFICANT CHANGE UP (ref 0.5–1.3)
EGFR: 103 ML/MIN/1.73M2 — SIGNIFICANT CHANGE UP
EOSINOPHIL # BLD AUTO: 0.14 K/UL — SIGNIFICANT CHANGE UP (ref 0–0.5)
EOSINOPHIL NFR BLD AUTO: 1.9 % — SIGNIFICANT CHANGE UP (ref 0–6)
GLUCOSE SERPL-MCNC: 96 MG/DL — SIGNIFICANT CHANGE UP (ref 70–99)
HCT VFR BLD CALC: 41.5 % — SIGNIFICANT CHANGE UP (ref 39–50)
HGB BLD-MCNC: 13.5 G/DL — SIGNIFICANT CHANGE UP (ref 13–17)
IANC: 4.92 K/UL — SIGNIFICANT CHANGE UP (ref 1.8–7.4)
IMM GRANULOCYTES NFR BLD AUTO: 0.4 % — SIGNIFICANT CHANGE UP (ref 0–0.9)
LYMPHOCYTES # BLD AUTO: 1.58 K/UL — SIGNIFICANT CHANGE UP (ref 1–3.3)
LYMPHOCYTES # BLD AUTO: 21.1 % — SIGNIFICANT CHANGE UP (ref 13–44)
MCHC RBC-ENTMCNC: 26.2 PG — LOW (ref 27–34)
MCHC RBC-ENTMCNC: 32.5 GM/DL — SIGNIFICANT CHANGE UP (ref 32–36)
MCV RBC AUTO: 80.6 FL — SIGNIFICANT CHANGE UP (ref 80–100)
MONOCYTES # BLD AUTO: 0.77 K/UL — SIGNIFICANT CHANGE UP (ref 0–0.9)
MONOCYTES NFR BLD AUTO: 10.3 % — SIGNIFICANT CHANGE UP (ref 2–14)
NEUTROPHILS # BLD AUTO: 4.92 K/UL — SIGNIFICANT CHANGE UP (ref 1.8–7.4)
NEUTROPHILS NFR BLD AUTO: 65.8 % — SIGNIFICANT CHANGE UP (ref 43–77)
NRBC # BLD: 0 /100 WBCS — SIGNIFICANT CHANGE UP (ref 0–0)
NRBC # FLD: 0 K/UL — SIGNIFICANT CHANGE UP (ref 0–0)
PLATELET # BLD AUTO: 219 K/UL — SIGNIFICANT CHANGE UP (ref 150–400)
POTASSIUM SERPL-MCNC: 3.5 MMOL/L — SIGNIFICANT CHANGE UP (ref 3.5–5.3)
POTASSIUM SERPL-SCNC: 3.5 MMOL/L — SIGNIFICANT CHANGE UP (ref 3.5–5.3)
PROT SERPL-MCNC: 6.4 G/DL — SIGNIFICANT CHANGE UP (ref 6–8.3)
RBC # BLD: 5.15 M/UL — SIGNIFICANT CHANGE UP (ref 4.2–5.8)
RBC # FLD: 15.2 % — HIGH (ref 10.3–14.5)
SODIUM SERPL-SCNC: 129 MMOL/L — LOW (ref 135–145)
TROPONIN T, HIGH SENSITIVITY RESULT: 16 NG/L — SIGNIFICANT CHANGE UP
TROPONIN T, HIGH SENSITIVITY RESULT: 17 NG/L — SIGNIFICANT CHANGE UP
WBC # BLD: 7.48 K/UL — SIGNIFICANT CHANGE UP (ref 3.8–10.5)
WBC # FLD AUTO: 7.48 K/UL — SIGNIFICANT CHANGE UP (ref 3.8–10.5)

## 2022-10-04 PROCEDURE — 99285 EMERGENCY DEPT VISIT HI MDM: CPT | Mod: GC

## 2022-10-04 PROCEDURE — 93010 ELECTROCARDIOGRAM REPORT: CPT | Mod: GC

## 2022-10-04 PROCEDURE — 71046 X-RAY EXAM CHEST 2 VIEWS: CPT | Mod: 26

## 2022-10-04 RX ORDER — IPRATROPIUM/ALBUTEROL SULFATE 18-103MCG
3 AEROSOL WITH ADAPTER (GRAM) INHALATION
Refills: 0 | Status: DISCONTINUED | OUTPATIENT
Start: 2022-10-04 | End: 2022-10-04

## 2022-10-04 RX ORDER — SODIUM CHLORIDE 9 MG/ML
1000 INJECTION INTRAMUSCULAR; INTRAVENOUS; SUBCUTANEOUS ONCE
Refills: 0 | Status: COMPLETED | OUTPATIENT
Start: 2022-10-04 | End: 2022-10-04

## 2022-10-04 RX ADMIN — SODIUM CHLORIDE 1000 MILLILITER(S): 9 INJECTION INTRAMUSCULAR; INTRAVENOUS; SUBCUTANEOUS at 14:35

## 2022-10-04 NOTE — ED PROVIDER NOTE - NS ED ROS FT
GENERAL: No fever or chills  EYES: No change in vision  HEENT: No trouble swallowing or speaking  GI: No abdominal pain, no nausea or no vomiting, no diarrhea or constipation  : No changes in urination  SKIN: No rashes  NEURO: No headache, no numbness  MSK: No joint pain  Otherwise as HPI or negative.

## 2022-10-04 NOTE — ED PROVIDER NOTE - NSFOLLOWUPCLINICS_GEN_ALL_ED_FT
[facility]  Psychiatry  [address] [place] [address]  Phone: [phone]  Fax:

## 2022-10-04 NOTE — ED ADULT NURSE NOTE - CHIEF COMPLAINT QUOTE
Patient brought to ER by EMS from MD office. He went to his PMD for shortness of breath and chest pain for years. Pt was hyperventilating in office (Panic Attack). Pt is on Haldol and Depakote. As per MD, this may well be a psychiatric issue.  Type 2 DM: FS: 103

## 2022-10-04 NOTE — ED PROVIDER NOTE - CLINICAL SUMMARY MEDICAL DECISION MAKING FREE TEXT BOX
57 y/o M PMHx HTN, asthma, bipolar disorder presents with SOB and chest pain. r/o acs. Possible asthma exacerbation, panic attack. EKG NSR. Labs, trop, cxr, duonebs. 57 y/o M PMHx HTN, asthma, bipolar disorder presents with SOB and chest pain. r/o acs. Possible asthma exacerbation, panic attack. EKG NSR. Labs, trop, cxr.

## 2022-10-04 NOTE — ED ADULT NURSE NOTE - OBJECTIVE STATEMENT
Patient received in stretcher. Shallow respirations noted.- oxygen in place No use of accessory muscles noted. No respiratory distress noted -education provided to patient. Patient reports being sent from PCP states he is having an anxiety attack. Patient reports every time he's had an anxiety attack this is what it feels like. Denies SI, HI, visual or auditory disturbances. Pending MD coronado. Comfort and safety maintained, Will continue to monitor Katie ZELAYA

## 2022-10-04 NOTE — ED PROVIDER NOTE - PATIENT PORTAL LINK FT
You can access the FollowMyHealth Patient Portal offered by [facility] by registering at the following website: [email or web address]. By joining [employer]’s FollowMyHealth portal, you will also be able to view your health information using other applications (apps) compatible with our system.
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
## 2022-10-04 NOTE — ED PROVIDER NOTE - ATTENDING CONTRIBUTION TO CARE
Mustapha: I have seen and examined the patient face to face, have reviewed and addended the HPI, PE and a/p as necessary.    55 yo M with HTN, asthma, bipolar disorder a/w shortness of breath and chest pain,  Pt reports he became nervous while in a taxi.  Noted to be rapidly breathing.  Patient seen and evaluated in triage. Noted to have substernal chest pain. No fever/chills, No photophobia/eye pain/changes in vision, No ear pain/sore throat/dysphagia, No chest pain/palpitations, no wheeze/stridor, No abd pain, No N/V/D, no dysuria/frequency/discharge, No neck/back pain, no rash, no changes in neurological status/function.     GEN - NAD; well appearing; A+O x3; non-toxic appearing  CARD -s1s2, RRR, no M,G,R;   PULM - slight wheeze, symmetric breath sounds;   ABD -  +BS, ND, NT, soft, no guarding, no rebound, no masses;   BACK - no CVA tenderness, Normal  spine;   EXT - symmetric pulses, 2+ dp, capillary refill < 2 seconds, no cyanosis, no edema;   NEURO - no focal neuro deficits, no slurred speech    55 yo M with HTN, asthma, bipolar disorder a/w shortness of breath and chest pain.  Will eval with chest xray, trop, cbc, cmp, reassess.  Symptomatic care.

## 2022-10-04 NOTE — ED PROVIDER NOTE - PHYSICAL EXAMINATION
GEN: Patient awake alert NAD.   HEENT: normocephalic, atraumatic, EOMI, no scleral icterus, moist MM  CARDIAC: RRR, S1, S2, no murmur.   PULM: end expiratory wheezing b/l.   ABD: soft NT, ND, no rebound no guarding  MSK: Moving all extremities, no edema. 5/5 strength and full ROM in all extremities.     NEURO: A&Ox3, gait normal, no focal neurological deficits, CN 2-12 grossly intact  SKIN: warm, dry, dirt and cuts on b/l feet.

## 2022-10-04 NOTE — ED ADULT TRIAGE NOTE - CHIEF COMPLAINT QUOTE
Patient brought to ER by EMS from MD office. He went to his PMD for shortness of breath and chest pain for years. Pt was hyperventilating in office (Panic Attack). Pt is on Haldol and Depakote. As per MD, this may well be a psychiatric issue. Patient brought to ER by EMS from MD office. He went to his PMD for shortness of breath and chest pain for years. Pt was hyperventilating in office (Panic Attack). Pt is on Haldol and Depakote. As per MD, this may well be a psychiatric issue.  Type 2 DM: FS: 103

## 2022-10-04 NOTE — ED PROVIDER NOTE - PROGRESS NOTE DETAILS
Patient states symptoms have improved. Mild end expiratory wheezing on left. Will follow-up once labs return.  -Karthikeyan Stuart PGY-1 Patient repeat troponin negative. Patient currently denies any complaints. AAOx3. Patient denies anxiety, depression, SI, HI, and patient states he has transportation home. Patient to be discharge.  -Karthikeyan Stuart PGY-1

## 2022-10-04 NOTE — ED PROVIDER NOTE - NSFOLLOWUPINSTRUCTIONS_ED_ALL_ED_FT
You were seen in the emergency department for chest pain and difficulty breathing.  At this time no concern for cardiac cause of your symptoms.   Please follow up with your primary doctor.     If you have worsening chest pain, difficulty breathing, nausea, vomiting, fever or new or worsening symptoms, please return to the emergency department.

## 2022-10-06 ENCOUNTER — APPOINTMENT (OUTPATIENT)
Dept: DERMATOLOGY | Facility: CLINIC | Age: 57
End: 2022-10-06

## 2022-10-06 ENCOUNTER — NON-APPOINTMENT (OUTPATIENT)
Age: 57
End: 2022-10-06

## 2022-10-06 ENCOUNTER — LABORATORY RESULT (OUTPATIENT)
Age: 57
End: 2022-10-06

## 2022-10-06 VITALS — WEIGHT: 300 LBS | BODY MASS INDEX: 38.5 KG/M2 | HEIGHT: 74 IN

## 2022-10-06 DIAGNOSIS — L85.9 EPIDERMAL THICKENING, UNSPECIFIED: ICD-10-CM

## 2022-10-06 PROCEDURE — 99204 OFFICE O/P NEW MOD 45 MIN: CPT | Mod: 25,GC

## 2022-10-06 PROCEDURE — 11104 PUNCH BX SKIN SINGLE LESION: CPT | Mod: GC

## 2022-10-06 RX ORDER — AMMONIUM LACTATE 12 %
12 CREAM (GRAM) TOPICAL TWICE DAILY
Qty: 1 | Refills: 5 | Status: ACTIVE | COMMUNITY
Start: 2022-10-06 | End: 1900-01-01

## 2022-10-13 ENCOUNTER — APPOINTMENT (OUTPATIENT)
Dept: RHEUMATOLOGY | Facility: CLINIC | Age: 57
End: 2022-10-13

## 2022-10-14 ENCOUNTER — APPOINTMENT (OUTPATIENT)
Dept: RHEUMATOLOGY | Facility: CLINIC | Age: 57
End: 2022-10-14

## 2022-10-14 VITALS
SYSTOLIC BLOOD PRESSURE: 128 MMHG | RESPIRATION RATE: 16 BRPM | OXYGEN SATURATION: 97 % | DIASTOLIC BLOOD PRESSURE: 73 MMHG | HEART RATE: 79 BPM

## 2022-10-14 VITALS
HEART RATE: 94 BPM | SYSTOLIC BLOOD PRESSURE: 155 MMHG | DIASTOLIC BLOOD PRESSURE: 78 MMHG | RESPIRATION RATE: 16 BRPM | OXYGEN SATURATION: 95 %

## 2022-10-14 PROCEDURE — 96365 THER/PROPH/DIAG IV INF INIT: CPT

## 2022-10-14 PROCEDURE — 96366 THER/PROPH/DIAG IV INF ADDON: CPT

## 2022-10-14 RX ORDER — IMMUNE GLOBULIN INTRAVENOUS (HUMAN) 10 G
10 KIT INTRAVENOUS
Qty: 0 | Refills: 0 | Status: COMPLETED | OUTPATIENT
Start: 2022-10-07

## 2022-10-14 RX ORDER — DIPHENHYDRAMINE HCL 25 MG/1
25 TABLET ORAL
Qty: 0 | Refills: 0 | Status: COMPLETED | OUTPATIENT
Start: 2022-10-07

## 2022-10-14 RX ORDER — ACETAMINOPHEN 325 MG/1
325 TABLET ORAL
Qty: 0 | Refills: 0 | Status: COMPLETED | OUTPATIENT
Start: 2022-10-07

## 2022-10-18 ENCOUNTER — NON-APPOINTMENT (OUTPATIENT)
Age: 57
End: 2022-10-18

## 2022-10-25 ENCOUNTER — NON-APPOINTMENT (OUTPATIENT)
Age: 57
End: 2022-10-25

## 2022-11-03 ENCOUNTER — NON-APPOINTMENT (OUTPATIENT)
Age: 57
End: 2022-11-03

## 2022-11-10 ENCOUNTER — NON-APPOINTMENT (OUTPATIENT)
Age: 57
End: 2022-11-10

## 2022-11-10 ENCOUNTER — APPOINTMENT (OUTPATIENT)
Dept: RHEUMATOLOGY | Facility: CLINIC | Age: 57
End: 2022-11-10

## 2022-11-10 VITALS
RESPIRATION RATE: 16 BRPM | HEART RATE: 80 BPM | OXYGEN SATURATION: 92 % | SYSTOLIC BLOOD PRESSURE: 156 MMHG | TEMPERATURE: 98 F | DIASTOLIC BLOOD PRESSURE: 81 MMHG

## 2022-11-10 VITALS
OXYGEN SATURATION: 92 % | DIASTOLIC BLOOD PRESSURE: 89 MMHG | RESPIRATION RATE: 16 BRPM | SYSTOLIC BLOOD PRESSURE: 157 MMHG | HEART RATE: 63 BPM

## 2022-11-10 PROCEDURE — 96366 THER/PROPH/DIAG IV INF ADDON: CPT

## 2022-11-10 PROCEDURE — 96365 THER/PROPH/DIAG IV INF INIT: CPT

## 2022-11-10 RX ORDER — ACETAMINOPHEN 325 MG/1
325 TABLET ORAL
Qty: 0 | Refills: 0 | Status: COMPLETED | OUTPATIENT
Start: 2022-11-04

## 2022-11-10 RX ORDER — IMMUNE GLOBULIN INTRAVENOUS (HUMAN) 10 G
10 KIT INTRAVENOUS
Qty: 0 | Refills: 0 | Status: COMPLETED | OUTPATIENT
Start: 2022-11-04

## 2022-11-10 RX ORDER — DIPHENHYDRAMINE HCL 25 MG/1
25 TABLET ORAL
Qty: 0 | Refills: 0 | Status: COMPLETED | OUTPATIENT
Start: 2022-11-04

## 2022-11-10 NOTE — HISTORY OF PRESENT ILLNESS
[N/A] : N/A [Denies] : Denies [No] : No [Yes] : Yes [Declined] : Declined [Left upper extremity] : Left upper extremity [22g] : 22g [Start Time: ___] : Medication Start Time: [unfilled] [End Time: ___] : Medication End Time: [unfilled] [IV discontinued. Intact. No signs or symptoms of IV complications noted. Time: ___] : IV discontinued. Intact. No signs or symptoms of IV complications noted. Time: [unfilled] [Patient  instructed to seek medical attention with signs and symptoms of adverse effects] : Patient  instructed to seek medical attention with signs and symptoms of adverse effects [Patient left unit in no acute distress] : Patient left unit in no acute distress [Medications administered as ordered and tolerated well.] : Medications administered as ordered and tolerated well. [de-identified] : Dorsal vein [de-identified] : Patient presents for Gammagard S/D infusion in [facility]. Patient denies any recent infection or antibiotic use. Patient admits to continuing to take blood pressure medication. Reports visit to ED yesterday for cough. As per patient he was sent home on nebulizer, cough attributed to heavy smoking(2 packs per day). Patient stated he also took antihistamine as prescribed by PMD with some relief in cough overnight. Patient stated plans to reduce amount of cigarettes smoked per day. Patient otherwise with no other s/s. Patient pre-medicated, titrated infusion as per protocol, tolerated well.

## 2022-11-30 ENCOUNTER — LABORATORY RESULT (OUTPATIENT)
Age: 57
End: 2022-11-30

## 2022-12-01 ENCOUNTER — LABORATORY RESULT (OUTPATIENT)
Age: 57
End: 2022-12-01

## 2022-12-01 ENCOUNTER — APPOINTMENT (OUTPATIENT)
Dept: DERMATOLOGY | Facility: CLINIC | Age: 57
End: 2022-12-01

## 2022-12-01 DIAGNOSIS — D48.5 NEOPLASM OF UNCERTAIN BEHAVIOR OF SKIN: ICD-10-CM

## 2022-12-01 DIAGNOSIS — R22.9 LOCALIZED SWELLING, MASS AND LUMP, UNSPECIFIED: ICD-10-CM

## 2022-12-01 DIAGNOSIS — Z86.03 PERSONAL HISTORY OF NEOPLASM OF UNCERTAIN BEHAVIOR: ICD-10-CM

## 2022-12-01 PROCEDURE — 11402 EXC TR-EXT B9+MARG 1.1-2 CM: CPT

## 2022-12-02 RX ADMIN — IMMUNE GLOBULIN INTRAVENOUS (HUMAN) 0 GM: KIT at 00:00

## 2022-12-05 PROBLEM — D48.5 NEOPLASM OF UNCERTAIN BEHAVIOR OF SKIN: Status: ACTIVE | Noted: 2022-12-05

## 2022-12-05 PROBLEM — Z86.03 HISTORY OF NEOPLASM OF UNCERTAIN BEHAVIOR OF SKIN: Status: RESOLVED | Noted: 2022-10-06 | Resolved: 2022-12-05

## 2022-12-05 PROBLEM — R22.9 SKIN NODULE: Status: ACTIVE | Noted: 2022-10-06

## 2022-12-07 RX ADMIN — IMMUNE GLOBULIN INTRAVENOUS (HUMAN) 0 GM: KIT at 00:00

## 2022-12-08 ENCOUNTER — APPOINTMENT (OUTPATIENT)
Dept: RHEUMATOLOGY | Facility: CLINIC | Age: 57
End: 2022-12-08

## 2022-12-08 VITALS
OXYGEN SATURATION: 97 % | DIASTOLIC BLOOD PRESSURE: 88 MMHG | SYSTOLIC BLOOD PRESSURE: 163 MMHG | RESPIRATION RATE: 16 BRPM | HEART RATE: 94 BPM | TEMPERATURE: 98.2 F

## 2022-12-08 VITALS
OXYGEN SATURATION: 97 % | HEART RATE: 67 BPM | RESPIRATION RATE: 16 BRPM | DIASTOLIC BLOOD PRESSURE: 90 MMHG | SYSTOLIC BLOOD PRESSURE: 161 MMHG

## 2022-12-08 PROCEDURE — 96365 THER/PROPH/DIAG IV INF INIT: CPT

## 2022-12-08 PROCEDURE — 96366 THER/PROPH/DIAG IV INF ADDON: CPT

## 2022-12-08 RX ORDER — DIPHENHYDRAMINE HCL 25 MG/1
25 TABLET ORAL
Qty: 0 | Refills: 0 | Status: COMPLETED | OUTPATIENT
Start: 2022-12-02

## 2022-12-08 RX ORDER — IMMUNE GLOBULIN INTRAVENOUS (HUMAN) 10 G
10 KIT INTRAVENOUS
Qty: 0 | Refills: 0 | Status: COMPLETED | OUTPATIENT
Start: 2022-12-02

## 2022-12-08 RX ORDER — ACETAMINOPHEN 325 MG/1
325 TABLET ORAL
Qty: 0 | Refills: 0 | Status: COMPLETED | OUTPATIENT
Start: 2022-12-02

## 2022-12-15 ENCOUNTER — NON-APPOINTMENT (OUTPATIENT)
Age: 57
End: 2022-12-15

## 2022-12-15 RX ORDER — CLOBETASOL PROPIONATE 0.5 MG/G
0.05 OINTMENT TOPICAL
Qty: 1 | Refills: 2 | Status: ACTIVE | COMMUNITY
Start: 2022-12-15 | End: 1900-01-01

## 2022-12-28 ENCOUNTER — NON-APPOINTMENT (OUTPATIENT)
Age: 57
End: 2022-12-28

## 2022-12-29 DIAGNOSIS — L30.9 DERMATITIS, UNSPECIFIED: ICD-10-CM

## 2022-12-29 RX ORDER — HALOBETASOL PROPIONATE 0.5 MG/G
0.05 OINTMENT TOPICAL
Qty: 1 | Refills: 1 | Status: ACTIVE | COMMUNITY
Start: 2022-12-29 | End: 1900-01-01

## 2022-12-30 RX ADMIN — IMMUNE GLOBULIN INTRAVENOUS (HUMAN) 0 GM: KIT at 00:00

## 2023-01-05 ENCOUNTER — APPOINTMENT (OUTPATIENT)
Dept: RHEUMATOLOGY | Facility: CLINIC | Age: 58
End: 2023-01-05
Payer: MEDICARE

## 2023-01-05 VITALS
RESPIRATION RATE: 16 BRPM | DIASTOLIC BLOOD PRESSURE: 87 MMHG | HEART RATE: 88 BPM | OXYGEN SATURATION: 95 % | SYSTOLIC BLOOD PRESSURE: 168 MMHG | TEMPERATURE: 98.1 F

## 2023-01-05 VITALS
RESPIRATION RATE: 16 BRPM | OXYGEN SATURATION: 99 % | HEART RATE: 84 BPM | SYSTOLIC BLOOD PRESSURE: 170 MMHG | DIASTOLIC BLOOD PRESSURE: 98 MMHG

## 2023-01-05 PROCEDURE — 96366 THER/PROPH/DIAG IV INF ADDON: CPT

## 2023-01-05 PROCEDURE — 96365 THER/PROPH/DIAG IV INF INIT: CPT

## 2023-01-05 RX ORDER — IMMUNE GLOBULIN INTRAVENOUS (HUMAN) 10 G
10 KIT INTRAVENOUS
Qty: 0 | Refills: 0 | Status: COMPLETED | OUTPATIENT
Start: 2022-12-30

## 2023-01-05 RX ORDER — DIPHENHYDRAMINE HCL 25 MG/1
25 TABLET ORAL
Qty: 0 | Refills: 0 | Status: COMPLETED | OUTPATIENT
Start: 2022-12-30

## 2023-01-05 RX ORDER — ACETAMINOPHEN 325 MG/1
325 TABLET ORAL
Qty: 0 | Refills: 0 | Status: COMPLETED | OUTPATIENT
Start: 2022-12-30

## 2023-01-05 NOTE — HISTORY OF PRESENT ILLNESS
[N/A] : N/A [Denies] : Denies [No] : No [Yes] : Yes [Declined] : Declined [Right upper extremity] : Right upper extremity [24g] : 24g [Medication Name: ___] : Medication Name: [unfilled] [IV discontinued. Intact. No signs or symptoms of IV complications noted. Time: ___] : IV discontinued. Intact. No signs or symptoms of IV complications noted. Time: [unfilled] [Patient  instructed to seek medical attention with signs and symptoms of adverse effects] : Patient  instructed to seek medical attention with signs and symptoms of adverse effects [Patient left unit in no acute distress] : Patient left unit in no acute distress [Medications administered as ordered and tolerated well.] : Medications administered as ordered and tolerated well. [Start Time: ___] : Medication Start Time: [unfilled] [End Time: ___] : Medication End Time: [unfilled] [de-identified] : Right hand dorsal vein [de-identified] : no labs  [de-identified] : Patient presents for Gammagard S/D infusion in [facility]. Patient reports left leg ulcer/wound/ getting better and denies any pain or bleeding. Patient is currently on ABX cream. Patient noted with elevated blood pressure pre and post infusion, stated that he ran out of his blood pressure pill and plans to get refills today. Patient denies any HA, blurry vision, weakness, SOB, or CP. Patient otherwise with no other s/s. Patient pre-medicated, titrated infusion as per protocol, tolerated well.

## 2023-01-09 ENCOUNTER — APPOINTMENT (OUTPATIENT)
Dept: ALLERGY | Facility: CLINIC | Age: 58
End: 2023-01-09
Payer: MEDICARE

## 2023-01-09 VITALS
DIASTOLIC BLOOD PRESSURE: 85 MMHG | SYSTOLIC BLOOD PRESSURE: 168 MMHG | HEART RATE: 98 BPM | OXYGEN SATURATION: 96 % | TEMPERATURE: 98.4 F | RESPIRATION RATE: 16 BRPM

## 2023-01-09 PROCEDURE — 99214 OFFICE O/P EST MOD 30 MIN: CPT

## 2023-01-09 RX ORDER — DOXYCYCLINE 100 MG/1
100 CAPSULE ORAL
Qty: 11 | Refills: 0 | Status: DISCONTINUED | COMMUNITY
Start: 2022-01-13 | End: 2023-01-09

## 2023-01-09 RX ORDER — AMOXICILLIN AND CLAVULANATE POTASSIUM 875; 125 MG/1; MG/1
875-125 TABLET, COATED ORAL
Qty: 14 | Refills: 0 | Status: DISCONTINUED | COMMUNITY
Start: 2022-01-19 | End: 2023-01-09

## 2023-01-09 RX ORDER — DOXYCYCLINE HYCLATE 100 MG/1
100 CAPSULE ORAL TWICE DAILY
Qty: 14 | Refills: 0 | Status: DISCONTINUED | COMMUNITY
Start: 2022-10-24 | End: 2023-01-09

## 2023-01-09 NOTE — HISTORY OF PRESENT ILLNESS
[de-identified] : Patient has had 4 COVID - he has not had the recent booster vaccination - he is doing well with IVIG - smoking 2 ppd cigarettes - $24 per day - he is using Ventolin 1x every few weeks.    He skipped his blood pressure medications this morning

## 2023-01-09 NOTE — PHYSICAL EXAM
[Alert] : alert [No Acute Distress] : no acute distress [Normal Voice/Communication] : normal voice communication [No Neck Mass] : no neck mass was observed [No LAD] : no lymphadenopathy [Normal Rate] : heart rate was normal  [Normal S1, S2] : normal S1 and S2 [No murmur] : no murmur [Regular Rhythm] : with a regular rhythm [Normal Cervical Lymph Nodes] : cervical [No Rash] : no rash [No clubbing] : no clubbing [No Edema] : no edema [No Cyanosis] : no cyanosis [de-identified] : obese  [de-identified] : adentulous [de-identified] : scant exp wheeze  [de-identified] : unkempt male

## 2023-01-09 NOTE — ASSESSMENT
[FreeTextEntry1] : CVID - \par \par Continue Gammagard IVIG infusion - patient presently receiving 70 gm every 4 weeks (500 mg/kg) \par Recheck IgG level in 2 months \par \par Moderate persistent asthma:\par \par Continue albuterol QID prn \par \par Chronic tobacco abuse - patient resistant to quitting cigarettes - he admits that he will not quit and he has increased the frequency of his smoking \par \par RV 3 months

## 2023-01-09 NOTE — SOCIAL HISTORY
[House] : [unfilled] lives in a house  [Cat] : cat [Single] : single [de-identified] : lives  [FreeTextEntry2] : on disability

## 2023-01-18 ENCOUNTER — EMERGENCY (EMERGENCY)
Facility: HOSPITAL | Age: 58
LOS: 1 days | Discharge: HOME | End: 2023-01-18
Attending: STUDENT IN AN ORGANIZED HEALTH CARE EDUCATION/TRAINING PROGRAM | Admitting: STUDENT IN AN ORGANIZED HEALTH CARE EDUCATION/TRAINING PROGRAM
Payer: MEDICARE

## 2023-01-18 VITALS
DIASTOLIC BLOOD PRESSURE: 107 MMHG | SYSTOLIC BLOOD PRESSURE: 178 MMHG | RESPIRATION RATE: 18 BRPM | HEART RATE: 77 BPM | TEMPERATURE: 98 F | OXYGEN SATURATION: 97 %

## 2023-01-18 VITALS
OXYGEN SATURATION: 94 % | RESPIRATION RATE: 17 BRPM | HEART RATE: 80 BPM | SYSTOLIC BLOOD PRESSURE: 172 MMHG | TEMPERATURE: 98 F | DIASTOLIC BLOOD PRESSURE: 94 MMHG

## 2023-01-18 DIAGNOSIS — K08.199 COMPLETE LOSS OF TEETH DUE TO OTHER SPECIFIED CAUSE, UNSPECIFIED CLASS: Chronic | ICD-10-CM

## 2023-01-18 DIAGNOSIS — J34.2 DEVIATED NASAL SEPTUM: Chronic | ICD-10-CM

## 2023-01-18 LAB
ALBUMIN SERPL ELPH-MCNC: 3.5 G/DL — SIGNIFICANT CHANGE UP (ref 3.3–5)
ALP SERPL-CCNC: 163 U/L — HIGH (ref 40–120)
ALT FLD-CCNC: 32 U/L — SIGNIFICANT CHANGE UP (ref 4–41)
ANION GAP SERPL CALC-SCNC: 10 MMOL/L — SIGNIFICANT CHANGE UP (ref 7–14)
AST SERPL-CCNC: 52 U/L — HIGH (ref 4–40)
BASOPHILS # BLD AUTO: 0.03 K/UL — SIGNIFICANT CHANGE UP (ref 0–0.2)
BASOPHILS NFR BLD AUTO: 0.3 % — SIGNIFICANT CHANGE UP (ref 0–2)
BILIRUB SERPL-MCNC: 0.3 MG/DL — SIGNIFICANT CHANGE UP (ref 0.2–1.2)
BLOOD GAS VENOUS COMPREHENSIVE RESULT: SIGNIFICANT CHANGE UP
BUN SERPL-MCNC: 5 MG/DL — LOW (ref 7–23)
CALCIUM SERPL-MCNC: 8.8 MG/DL — SIGNIFICANT CHANGE UP (ref 8.4–10.5)
CHLORIDE SERPL-SCNC: 81 MMOL/L — LOW (ref 98–107)
CO2 SERPL-SCNC: 25 MMOL/L — SIGNIFICANT CHANGE UP (ref 22–31)
CREAT SERPL-MCNC: 0.5 MG/DL — SIGNIFICANT CHANGE UP (ref 0.5–1.3)
EGFR: 120 ML/MIN/1.73M2 — SIGNIFICANT CHANGE UP
EOSINOPHIL # BLD AUTO: 0.26 K/UL — SIGNIFICANT CHANGE UP (ref 0–0.5)
EOSINOPHIL NFR BLD AUTO: 2.7 % — SIGNIFICANT CHANGE UP (ref 0–6)
FLUAV AG NPH QL: SIGNIFICANT CHANGE UP
FLUBV AG NPH QL: SIGNIFICANT CHANGE UP
GLUCOSE SERPL-MCNC: 115 MG/DL — HIGH (ref 70–99)
HCT VFR BLD CALC: 42.1 % — SIGNIFICANT CHANGE UP (ref 39–50)
HGB BLD-MCNC: 14.4 G/DL — SIGNIFICANT CHANGE UP (ref 13–17)
IANC: 7.16 K/UL — SIGNIFICANT CHANGE UP (ref 1.8–7.4)
IMM GRANULOCYTES NFR BLD AUTO: 0.3 % — SIGNIFICANT CHANGE UP (ref 0–0.9)
LIDOCAIN IGE QN: 32 U/L — SIGNIFICANT CHANGE UP (ref 7–60)
LYMPHOCYTES # BLD AUTO: 1.33 K/UL — SIGNIFICANT CHANGE UP (ref 1–3.3)
LYMPHOCYTES # BLD AUTO: 13.9 % — SIGNIFICANT CHANGE UP (ref 13–44)
MCHC RBC-ENTMCNC: 26.1 PG — LOW (ref 27–34)
MCHC RBC-ENTMCNC: 34.2 GM/DL — SIGNIFICANT CHANGE UP (ref 32–36)
MCV RBC AUTO: 76.3 FL — LOW (ref 80–100)
MONOCYTES # BLD AUTO: 0.78 K/UL — SIGNIFICANT CHANGE UP (ref 0–0.9)
MONOCYTES NFR BLD AUTO: 8.1 % — SIGNIFICANT CHANGE UP (ref 2–14)
NEUTROPHILS # BLD AUTO: 7.16 K/UL — SIGNIFICANT CHANGE UP (ref 1.8–7.4)
NEUTROPHILS NFR BLD AUTO: 74.7 % — SIGNIFICANT CHANGE UP (ref 43–77)
NRBC # BLD: 0 /100 WBCS — SIGNIFICANT CHANGE UP (ref 0–0)
NRBC # FLD: 0 K/UL — SIGNIFICANT CHANGE UP (ref 0–0)
OSMOLALITY SERPL: 260 MOSM/KG — LOW (ref 275–295)
PLATELET # BLD AUTO: 225 K/UL — SIGNIFICANT CHANGE UP (ref 150–400)
POTASSIUM SERPL-MCNC: 4.5 MMOL/L — SIGNIFICANT CHANGE UP (ref 3.5–5.3)
POTASSIUM SERPL-SCNC: 4.5 MMOL/L — SIGNIFICANT CHANGE UP (ref 3.5–5.3)
PROT SERPL-MCNC: 6.8 G/DL — SIGNIFICANT CHANGE UP (ref 6–8.3)
RBC # BLD: 5.52 M/UL — SIGNIFICANT CHANGE UP (ref 4.2–5.8)
RBC # FLD: 15.1 % — HIGH (ref 10.3–14.5)
RSV RNA NPH QL NAA+NON-PROBE: SIGNIFICANT CHANGE UP
SARS-COV-2 RNA SPEC QL NAA+PROBE: SIGNIFICANT CHANGE UP
SODIUM SERPL-SCNC: 116 MMOL/L — CRITICAL LOW (ref 135–145)
TOXICOLOGY SCREEN, DRUGS OF ABUSE, SERUM RESULT: SIGNIFICANT CHANGE UP
TSH SERPL-MCNC: 4.53 UIU/ML — HIGH (ref 0.27–4.2)
WBC # BLD: 9.59 K/UL — SIGNIFICANT CHANGE UP (ref 3.8–10.5)
WBC # FLD AUTO: 9.59 K/UL — SIGNIFICANT CHANGE UP (ref 3.8–10.5)

## 2023-01-18 PROCEDURE — 99284 EMERGENCY DEPT VISIT MOD MDM: CPT | Mod: FS

## 2023-01-18 PROCEDURE — 71046 X-RAY EXAM CHEST 2 VIEWS: CPT | Mod: 26

## 2023-01-18 RX ORDER — FUROSEMIDE 40 MG
40 TABLET ORAL ONCE
Refills: 0 | Status: COMPLETED | OUTPATIENT
Start: 2023-01-18 | End: 2023-01-18

## 2023-01-18 RX ADMIN — Medication 40 MILLIGRAM(S): at 16:50

## 2023-01-18 NOTE — CHART NOTE - NSCHARTNOTEFT_GEN_A_CORE
Patient provided with metro card, stating he had nobody to pick him up, stating " ill figure it out but I don't have money for the bus". [employer] Card [id number].

## 2023-01-18 NOTE — ED ADULT NURSE NOTE - OBJECTIVE STATEMENT
Received patient in 21A from [facility] c/o lethargy, medicated Haldol from [facility]. Patient denies SOB, chest pain. Patient is A&OX1, lethargic, arousable to verbal stimuli, follows command, airway patent, breathing unlabored and even, radial pulses palpable. Labs obtained, 20G IV placed on left hand. Side rails up and safety maintained. Fall precaution in place.

## 2023-01-18 NOTE — ED ADULT TRIAGE NOTE - CHIEF COMPLAINT QUOTE
Pt presents to ED via EMS from [facility] outpatient for his monthly Haldol injection when staff found him to be more lethargic than usual. Pt denies any substance abuse. Pt DID receive 100mg IM injection of haldol. Pt denies physical complaints.

## 2023-01-18 NOTE — ED ADULT NURSE REASSESSMENT NOTE - NS ED NURSE REASSESS COMMENT FT1
Patient signed AMA and released by GERMAN Burns. IV removed, clean, dry dressing applied. Patient ambulated out of ED in a stable in condition, no distress noted.

## 2023-01-18 NOTE — ED PROVIDER NOTE - ATTENDING APP SHARED VISIT CONTRIBUTION OF CARE
57 year old male with past medical history of hypertension, bipolar disorder, diabetes sent to Ed from outpatient [facility] for evaluation of lethargy and somnolence.States he was there for his monthly haldol injection.Reports he is tired but feels well at this time and does not really want to be in emergency. PE: tired appearing, RRR, CTA BL lungs, abd soft NTND, aaox4

## 2023-01-18 NOTE — ED PROVIDER NOTE - CLINICAL SUMMARY MEDICAL DECISION MAKING FREE TEXT BOX
57-year-old male past medical history of hypertension, bipolar, diabetes here sent in from Dr. Jenkins for increasing somnolence observed by Michelle [facility] attending today while receiving his monthly Haldol injection.  Upon speaking with the physician at [facility], stated that patient's blood pressure was high despite taking his home blood pressure medications.  The patient also told [facility] staff that he experiences shortness of breath when exerting himself for several blocks, attributed to walking.  No chest pain.  Here EKG is normal, chest x-ray is unchanged from prior and clear.  Labs concerning for severe hyponatremia.  Patient became more alert and started refusing our plan, which included admission, and nephrology evaluation.  Patient admits to drinking large quantities of water and juice.  States his primary care doctor told him that his sodium level was low in the past, and that he should stop drinking.  Spoke with patient's Sister Brittany, who spoke with the patient as well and attempted to convince the patient to stay in the hospital, however patient continues to refuse.  The patient is alert and oriented x3, has decision-making capacity.  Refusing nicotine patch or Ativan to help him with anxiety, which was offered to help him get more comfortable and agree to staying in the hospital.  Nephrology spoke with patient as well, explained that patient faces risk of death, cerebral edema, coma, worsening of his condition.  Patient was able to repeat the risks that he faces by leaving the hospital.  States that he will stop drinking excess fluids at home.  States he feels well and does not want to stay in the hospital any longer.  Gait observed, within normal limits.  Patient states he will take a bus to his apartment in [place].  This was communicated to patient's Sister Brittany, who was given return precautions and encouraged to stay with the patient overnight to ensure his condition does not worsen.  Patient signing out AGAINST MEDICAL ADVICE.  We will call [phone] to have call back PA speak with patient tomorrow, to discuss troponin/proBNP results which are still pending, and ensure patient has followed up with his PMD

## 2023-01-18 NOTE — ED PROVIDER NOTE - NSFOLLOWUPINSTRUCTIONS_ED_ALL_ED_FT
We found your sodium level to be dangerously low- this can cause swelling of your brain, coma, and even death. We think this is because you are drinking a lot of fluids and diluting your salt levels. You must see your primary care doctor tomorrow for follow up. STOP DRINKING EXCESS FLUIDS! Return to the ER for worsening symptoms, shortness of breath, vomiting, confusion, or any other concerns.

## 2023-01-18 NOTE — ED ADULT NURSE NOTE - CHIEF COMPLAINT
Addended by: Nirav Adan on: 8/12/2020 07:25 PM     Modules accepted: Keven Gonzalez
The patient is a 57y Male complaining of

## 2023-01-18 NOTE — CHART NOTE - NSCHARTNOTEFT_GEN_A_CORE
Reason for consult: Hyponatremia    HPI: Pt is a 57-year-old male with Hx of bipolar disorder, asthma who was sent from [facility] to [facility] today for lethargy after receiving Haldol IM. Initial labs concerning for hyponatremia. Nephrology team was consulted for hyponatremia.     On review of [facility], it was noted that SNa was low at 129 on 10/4/22. On presentation today, SNa is low at 116 at 3pm. Pt was seen and evaluated in ER. Pt is awake and oriented and wishes to go home. Pt says he was made aware that he has chronic hyponatremia by his PCP. Pt says he drinks more then adequate amount of water and was advised to restrict fluid intake in view of hyponatremia. Pt denies any fever, confusion, SOB, CP, HA, N/V, abdominal pain , urinary symptoms or dizziness. Pt wishes to go home, risks including but not limited to confusion, fogginess, lethargy and coma associated with hyponatremia explained at length. Pt understood the discussion and still wishes to go home. I advised pt to restrict fluid intake<1L per day. Also advised to come back to ER if any of the above mentioned symptoms occur. Pt also advised to follow up with his PCP within 24 hrs to get repeat blood work done.       If you have any questions, please feel free to contact me  Melecio Roth  Nephrology Fellow  [phone]/ [employer] Teams(Preferred)  (After 5pm or on weekends please page the on-call fellow). Reason for consult: Hyponatremia    HPI: Pt is a 57-year-old male with Hx of bipolar disorder, asthma who was sent from [facility] to [facility] today for lethargy after receiving Haldol IM. Initial labs concerning for hyponatremia. Nephrology team was consulted for hyponatremia.     On review of [facility], it was noted that SNa was low at 129 on 10/4/22. On presentation today, SNa is low at 116 at 3pm. Pt was seen and evaluated in ER. Pt is awake and oriented x3 and wishes to go home. Pt says he was made aware that he has chronic hyponatremia by his PCP. Pt says he drinks more then adequate amount of water and was advised to restrict fluid intake in view of hyponatremia. Pt denies any fever, confusion, SOB, CP, HA, N/V, abdominal pain , urinary symptoms or dizziness. Pt wishes to go home, risks including but not limited to confusion, fogginess, lethargy and coma associated with hyponatremia explained at length. Pt understood the discussion and still wishes to go home. Pt also refuses to get repeat blood work done. I advised pt to restrict fluid intake<1L per day. Also advised to come back to ER if any of the above mentioned symptoms occur. Pt also advised to follow up with his PCP within 24 hrs to get repeat blood work done.       If you have any questions, please feel free to contact me  Melecio Roth  Nephrology Fellow  [phone]/ [employer] Teams(Preferred)  (After 5pm or on weekends please page the on-call fellow).

## 2023-01-18 NOTE — ED PROVIDER NOTE - OBJECTIVE STATEMENT
58 y/o M hx HTN, Bipolar (managed at [facility] w/ monthly haldol injections), hypogammaglobinemia, DM, sent in from [facility] outpatient clinic for increased lethargy and somnolence. Was there for his monthly Haldol injection. Pt is sleepy, but able to be awakened. States he was sent in because his blood pressure was high. Feel well otherwise. Admits to one episode of vomiting last night. Denies fevers/chill/n/v. Spoke w/ physician at [facility], state pts bp was high despite resting at [facility]. Denied any chest pain but admitted to exertional sob which he attributed to smoking cigarettes. Pt currently denies cp or sob. No SI/HI/hallucinations, feels well.

## 2023-01-18 NOTE — ED ADULT NURSE NOTE - CHIEF COMPLAINT QUOTE
Pt presents to ED via EMS from [facility] outpatient for his monthly Haldol injection when staff found him to be more lethargic than usual. Pt denies any substance abuse. Pt DID receive 100mg IM injection of haldol. Pt denies physical complaints.

## 2023-01-18 NOTE — ED PROVIDER NOTE - PROGRESS NOTE DETAILS
MARCIO- 57-year-old male past medical history of hypertension, bipolar, diabetes here sent in from Dr. Jenkins for increasing somnolence observed by [facility] Jenkins attending today while receiving his monthly Haldol injection.  Upon speaking with the physician at [facility], stated that patient's blood pressure was high despite taking his home blood pressure medications.  The patient also told [facility] staff that he experiences shortness of breath when exerting himself for several blocks, attributed to walking.  No chest pain.  Here EKG is normal, chest x-ray is unchanged from prior and clear.  Labs concerning for severe hyponatremia.  Patient became more alert and started refusing our plan, which included admission, and nephrology evaluation.  Patient admits to drinking large quantities of water and juice.  States his primary care doctor told him that his sodium level was low in the past, and that he should stop drinking.  Spoke with patient's Sister Brittany, who spoke with the patient as well and attempted to convince the patient to stay in the hospital, however patient continues to refuse.  The patient is alert and oriented x3, has decision-making capacity.  Refusing nicotine patch or Ativan to help him with anxiety, which was offered to help him get more comfortable and agree to staying in the hospital.  Nephrology spoke with patient as well, explained that patient faces risk of death, cerebral edema, coma, worsening of his condition.  Patient was able to repeat the risks that he faces by leaving the hospital.  States that he will stop drinking excess fluids at home.  States he feels well and does not want to stay in the hospital any longer.  Gait observed, within normal limits.  Patient states he will take a bus to his apartment in [place].  This was communicated to patient's Sister Brittany, who was given return precautions and encouraged to stay with the patient overnight to ensure his condition does not worsen.  Patient signing out AGAINST MEDICAL ADVICE.  We will call [phone] to have call back PA speak with patient tomorrow, to discuss troponin/proBNP results which are still pending, and ensure patient has followed up with his PMD Pasquale:   The patient is leaving against medical advice. Ida, myself, and nephro spent extended time at bedside discussing the risks of leaving without treatment with hyponatremia,the patient understands the risk of leaving without further evaluation and workup.  The patient however still declines and states will follow up outpatient.

## 2023-01-18 NOTE — ED PROVIDER NOTE - PATIENT PORTAL LINK FT
You can access the FollowMyHealth Patient Portal offered by [facility] by registering at the following website: [email or web address]. By joining [email or web address]’s FollowMyHealth portal, you will also be able to view your health information using other applications (apps) compatible with our system.

## 2023-01-19 ENCOUNTER — APPOINTMENT (OUTPATIENT)
Dept: DERMATOLOGY | Facility: CLINIC | Age: 58
End: 2023-01-19

## 2023-01-19 NOTE — ED POST DISCHARGE NOTE - RESULT SUMMARY
GERMAN Burns: Pt called back to see how he is feeling. Pt states he is feeling better, has been restricting fluids. Discussed elevated troponin 19, recommended cardiology eval for further evaluation of exertional dyspnea, pt agreed.  Recommended pt f/u w/ pmd today to repeat labs to monitor sodium. Discussed fluid restriction <1 L a day. Pt understands, given strict return precautions.

## 2023-01-27 RX ADMIN — IMMUNE GLOBULIN INTRAVENOUS (HUMAN) 0 GM: KIT at 00:00

## 2023-01-31 RX ADMIN — IMMUNE GLOBULIN INTRAVENOUS (HUMAN) 0 GM: KIT at 00:00

## 2023-02-02 ENCOUNTER — TRANSCRIPTION ENCOUNTER (OUTPATIENT)
Age: 58
End: 2023-02-02

## 2023-02-02 ENCOUNTER — INPATIENT (INPATIENT)
Facility: HOSPITAL | Age: 58
LOS: 0 days | Discharge: LEFT BEFORE DISCHARGE | End: 2023-02-02
Attending: STUDENT IN AN ORGANIZED HEALTH CARE EDUCATION/TRAINING PROGRAM | Admitting: STUDENT IN AN ORGANIZED HEALTH CARE EDUCATION/TRAINING PROGRAM
Payer: MEDICARE

## 2023-02-02 VITALS
TEMPERATURE: 98 F | SYSTOLIC BLOOD PRESSURE: 192 MMHG | DIASTOLIC BLOOD PRESSURE: 103 MMHG | OXYGEN SATURATION: 100 % | HEART RATE: 76 BPM | RESPIRATION RATE: 22 BRPM

## 2023-02-02 VITALS
DIASTOLIC BLOOD PRESSURE: 73 MMHG | OXYGEN SATURATION: 95 % | HEART RATE: 81 BPM | RESPIRATION RATE: 18 BRPM | SYSTOLIC BLOOD PRESSURE: 137 MMHG | TEMPERATURE: 98 F

## 2023-02-02 DIAGNOSIS — J45.901 UNSPECIFIED ASTHMA WITH (ACUTE) EXACERBATION: ICD-10-CM

## 2023-02-02 DIAGNOSIS — Z79.899 OTHER LONG TERM (CURRENT) DRUG THERAPY: ICD-10-CM

## 2023-02-02 DIAGNOSIS — D80.1 NONFAMILIAL HYPOGAMMAGLOBULINEMIA: ICD-10-CM

## 2023-02-02 DIAGNOSIS — L03.90 CELLULITIS, UNSPECIFIED: ICD-10-CM

## 2023-02-02 DIAGNOSIS — J34.2 DEVIATED NASAL SEPTUM: Chronic | ICD-10-CM

## 2023-02-02 DIAGNOSIS — Z29.9 ENCOUNTER FOR PROPHYLACTIC MEASURES, UNSPECIFIED: ICD-10-CM

## 2023-02-02 DIAGNOSIS — F31.9 BIPOLAR DISORDER, UNSPECIFIED: ICD-10-CM

## 2023-02-02 DIAGNOSIS — I10 ESSENTIAL (PRIMARY) HYPERTENSION: ICD-10-CM

## 2023-02-02 DIAGNOSIS — E11.65 TYPE 2 DIABETES MELLITUS WITH HYPERGLYCEMIA: ICD-10-CM

## 2023-02-02 DIAGNOSIS — E87.1 HYPO-OSMOLALITY AND HYPONATREMIA: ICD-10-CM

## 2023-02-02 DIAGNOSIS — K08.199 COMPLETE LOSS OF TEETH DUE TO OTHER SPECIFIED CAUSE, UNSPECIFIED CLASS: Chronic | ICD-10-CM

## 2023-02-02 LAB
ALBUMIN SERPL ELPH-MCNC: 3.4 G/DL — SIGNIFICANT CHANGE UP (ref 3.3–5)
ALBUMIN SERPL ELPH-MCNC: 3.5 G/DL — SIGNIFICANT CHANGE UP (ref 3.3–5)
ALP SERPL-CCNC: 165 U/L — HIGH (ref 40–120)
ALP SERPL-CCNC: 177 U/L — HIGH (ref 40–120)
ALT FLD-CCNC: 28 U/L — SIGNIFICANT CHANGE UP (ref 4–41)
ALT FLD-CCNC: 35 U/L — SIGNIFICANT CHANGE UP (ref 4–41)
ANION GAP SERPL CALC-SCNC: 11 MMOL/L — SIGNIFICANT CHANGE UP (ref 7–14)
ANION GAP SERPL CALC-SCNC: 7 MMOL/L — SIGNIFICANT CHANGE UP (ref 7–14)
ANION GAP SERPL CALC-SCNC: 9 MMOL/L — SIGNIFICANT CHANGE UP (ref 7–14)
APPEARANCE UR: CLEAR — SIGNIFICANT CHANGE UP
AST SERPL-CCNC: 21 U/L — SIGNIFICANT CHANGE UP (ref 4–40)
AST SERPL-CCNC: 46 U/L — HIGH (ref 4–40)
B PERT DNA SPEC QL NAA+PROBE: SIGNIFICANT CHANGE UP
B PERT+PARAPERT DNA PNL SPEC NAA+PROBE: SIGNIFICANT CHANGE UP
BACTERIA # UR AUTO: NEGATIVE — SIGNIFICANT CHANGE UP
BILIRUB SERPL-MCNC: 0.2 MG/DL — SIGNIFICANT CHANGE UP (ref 0.2–1.2)
BILIRUB SERPL-MCNC: 0.3 MG/DL — SIGNIFICANT CHANGE UP (ref 0.2–1.2)
BILIRUB UR-MCNC: NEGATIVE — SIGNIFICANT CHANGE UP
BORDETELLA PARAPERTUSSIS (RAPRVP): SIGNIFICANT CHANGE UP
BUN SERPL-MCNC: 7 MG/DL — SIGNIFICANT CHANGE UP (ref 7–23)
BUN SERPL-MCNC: 8 MG/DL — SIGNIFICANT CHANGE UP (ref 7–23)
BUN SERPL-MCNC: 9 MG/DL — SIGNIFICANT CHANGE UP (ref 7–23)
C PNEUM DNA SPEC QL NAA+PROBE: SIGNIFICANT CHANGE UP
CALCIUM SERPL-MCNC: 9 MG/DL — SIGNIFICANT CHANGE UP (ref 8.4–10.5)
CALCIUM SERPL-MCNC: 9.1 MG/DL — SIGNIFICANT CHANGE UP (ref 8.4–10.5)
CALCIUM SERPL-MCNC: 9.3 MG/DL — SIGNIFICANT CHANGE UP (ref 8.4–10.5)
CHLORIDE SERPL-SCNC: 84 MMOL/L — LOW (ref 98–107)
CHLORIDE SERPL-SCNC: 91 MMOL/L — LOW (ref 98–107)
CHLORIDE SERPL-SCNC: 92 MMOL/L — LOW (ref 98–107)
CHLORIDE UR-SCNC: 59 MMOL/L — SIGNIFICANT CHANGE UP
CO2 SERPL-SCNC: 23 MMOL/L — SIGNIFICANT CHANGE UP (ref 22–31)
CO2 SERPL-SCNC: 30 MMOL/L — SIGNIFICANT CHANGE UP (ref 22–31)
CO2 SERPL-SCNC: 32 MMOL/L — HIGH (ref 22–31)
COLOR SPEC: SIGNIFICANT CHANGE UP
CREAT ?TM UR-MCNC: 35 MG/DL — SIGNIFICANT CHANGE UP
CREAT SERPL-MCNC: 0.48 MG/DL — LOW (ref 0.5–1.3)
CREAT SERPL-MCNC: 0.57 MG/DL — SIGNIFICANT CHANGE UP (ref 0.5–1.3)
CREAT SERPL-MCNC: 0.64 MG/DL — SIGNIFICANT CHANGE UP (ref 0.5–1.3)
D DIMER BLD IA.RAPID-MCNC: <150 NG/ML DDU — SIGNIFICANT CHANGE UP
DIFF PNL FLD: NEGATIVE — SIGNIFICANT CHANGE UP
EGFR: 112 ML/MIN/1.73M2 — SIGNIFICANT CHANGE UP
EGFR: 116 ML/MIN/1.73M2 — SIGNIFICANT CHANGE UP
EGFR: 122 ML/MIN/1.73M2 — SIGNIFICANT CHANGE UP
EPI CELLS # UR: 1 /HPF — SIGNIFICANT CHANGE UP (ref 0–5)
FLUAV SUBTYP SPEC NAA+PROBE: SIGNIFICANT CHANGE UP
FLUBV RNA SPEC QL NAA+PROBE: SIGNIFICANT CHANGE UP
GLUCOSE BLDC GLUCOMTR-MCNC: 154 MG/DL — HIGH (ref 70–99)
GLUCOSE BLDC GLUCOMTR-MCNC: 234 MG/DL — HIGH (ref 70–99)
GLUCOSE SERPL-MCNC: 102 MG/DL — HIGH (ref 70–99)
GLUCOSE SERPL-MCNC: 143 MG/DL — HIGH (ref 70–99)
GLUCOSE SERPL-MCNC: 208 MG/DL — HIGH (ref 70–99)
GLUCOSE UR QL: NEGATIVE — SIGNIFICANT CHANGE UP
HADV DNA SPEC QL NAA+PROBE: SIGNIFICANT CHANGE UP
HCOV 229E RNA SPEC QL NAA+PROBE: SIGNIFICANT CHANGE UP
HCOV HKU1 RNA SPEC QL NAA+PROBE: SIGNIFICANT CHANGE UP
HCOV NL63 RNA SPEC QL NAA+PROBE: SIGNIFICANT CHANGE UP
HCOV OC43 RNA SPEC QL NAA+PROBE: SIGNIFICANT CHANGE UP
HCT VFR BLD CALC: 38.9 % — LOW (ref 39–50)
HGB BLD-MCNC: 12.7 G/DL — LOW (ref 13–17)
HMPV RNA SPEC QL NAA+PROBE: SIGNIFICANT CHANGE UP
HPIV1 RNA SPEC QL NAA+PROBE: SIGNIFICANT CHANGE UP
HPIV2 RNA SPEC QL NAA+PROBE: SIGNIFICANT CHANGE UP
HPIV3 RNA SPEC QL NAA+PROBE: SIGNIFICANT CHANGE UP
HPIV4 RNA SPEC QL NAA+PROBE: SIGNIFICANT CHANGE UP
KETONES UR-MCNC: NEGATIVE — SIGNIFICANT CHANGE UP
LEUKOCYTE ESTERASE UR-ACNC: NEGATIVE — SIGNIFICANT CHANGE UP
M PNEUMO DNA SPEC QL NAA+PROBE: SIGNIFICANT CHANGE UP
MAGNESIUM SERPL-MCNC: 1.9 MG/DL — SIGNIFICANT CHANGE UP (ref 1.6–2.6)
MCHC RBC-ENTMCNC: 25.2 PG — LOW (ref 27–34)
MCHC RBC-ENTMCNC: 32.6 GM/DL — SIGNIFICANT CHANGE UP (ref 32–36)
MCV RBC AUTO: 77.3 FL — LOW (ref 80–100)
NITRITE UR-MCNC: NEGATIVE — SIGNIFICANT CHANGE UP
NRBC # BLD: 0 /100 WBCS — SIGNIFICANT CHANGE UP (ref 0–0)
NRBC # FLD: 0 K/UL — SIGNIFICANT CHANGE UP (ref 0–0)
NT-PROBNP SERPL-SCNC: 52 PG/ML — SIGNIFICANT CHANGE UP
OSMOLALITY SERPL: 278 MOSM/KG — SIGNIFICANT CHANGE UP (ref 275–295)
OSMOLALITY UR: 267 MOSM/KG — SIGNIFICANT CHANGE UP (ref 50–1200)
PH UR: 7.5 — SIGNIFICANT CHANGE UP (ref 5–8)
PHOSPHATE SERPL-MCNC: 3.5 MG/DL — SIGNIFICANT CHANGE UP (ref 2.5–4.5)
PLATELET # BLD AUTO: 205 K/UL — SIGNIFICANT CHANGE UP (ref 150–400)
POTASSIUM SERPL-MCNC: 4.1 MMOL/L — SIGNIFICANT CHANGE UP (ref 3.5–5.3)
POTASSIUM SERPL-MCNC: 4.5 MMOL/L — SIGNIFICANT CHANGE UP (ref 3.5–5.3)
POTASSIUM SERPL-MCNC: 5.2 MMOL/L — SIGNIFICANT CHANGE UP (ref 3.5–5.3)
POTASSIUM SERPL-SCNC: 4.1 MMOL/L — SIGNIFICANT CHANGE UP (ref 3.5–5.3)
POTASSIUM SERPL-SCNC: 4.5 MMOL/L — SIGNIFICANT CHANGE UP (ref 3.5–5.3)
POTASSIUM SERPL-SCNC: 5.2 MMOL/L — SIGNIFICANT CHANGE UP (ref 3.5–5.3)
POTASSIUM UR-SCNC: 33.8 MMOL/L — SIGNIFICANT CHANGE UP
PROT ?TM UR-MCNC: 44 MG/DL — SIGNIFICANT CHANGE UP
PROT SERPL-MCNC: 6.2 G/DL — SIGNIFICANT CHANGE UP (ref 6–8.3)
PROT SERPL-MCNC: 6.3 G/DL — SIGNIFICANT CHANGE UP (ref 6–8.3)
PROT UR-MCNC: ABNORMAL
PROT/CREAT UR-RTO: 1.2 RATIO — HIGH (ref 0–0.2)
RAPID RVP RESULT: SIGNIFICANT CHANGE UP
RBC # BLD: 5.03 M/UL — SIGNIFICANT CHANGE UP (ref 4.2–5.8)
RBC # FLD: 15.1 % — HIGH (ref 10.3–14.5)
RBC CASTS # UR COMP ASSIST: 1 /HPF — SIGNIFICANT CHANGE UP (ref 0–4)
RSV RNA SPEC QL NAA+PROBE: SIGNIFICANT CHANGE UP
RV+EV RNA SPEC QL NAA+PROBE: SIGNIFICANT CHANGE UP
SARS-COV-2 RNA SPEC QL NAA+PROBE: SIGNIFICANT CHANGE UP
SODIUM SERPL-SCNC: 118 MMOL/L — CRITICAL LOW (ref 135–145)
SODIUM SERPL-SCNC: 130 MMOL/L — LOW (ref 135–145)
SODIUM SERPL-SCNC: 131 MMOL/L — LOW (ref 135–145)
SODIUM UR-SCNC: 52 MMOL/L — SIGNIFICANT CHANGE UP
SP GR SPEC: 1.01 — SIGNIFICANT CHANGE UP (ref 1.01–1.05)
TROPONIN T, HIGH SENSITIVITY RESULT: 17 NG/L — SIGNIFICANT CHANGE UP
TROPONIN T, HIGH SENSITIVITY RESULT: 20 NG/L — SIGNIFICANT CHANGE UP
TSH SERPL-MCNC: 1.97 UIU/ML — SIGNIFICANT CHANGE UP (ref 0.27–4.2)
UROBILINOGEN FLD QL: SIGNIFICANT CHANGE UP
WBC # BLD: 10.19 K/UL — SIGNIFICANT CHANGE UP (ref 3.8–10.5)
WBC # FLD AUTO: 10.19 K/UL — SIGNIFICANT CHANGE UP (ref 3.8–10.5)
WBC UR QL: 0 /HPF — SIGNIFICANT CHANGE UP (ref 0–5)

## 2023-02-02 PROCEDURE — 12345: CPT | Mod: NC

## 2023-02-02 PROCEDURE — 73590 X-RAY EXAM OF LOWER LEG: CPT | Mod: 26,LT

## 2023-02-02 PROCEDURE — 99222 1ST HOSP IP/OBS MODERATE 55: CPT | Mod: GC

## 2023-02-02 PROCEDURE — 93306 TTE W/DOPPLER COMPLETE: CPT | Mod: 26

## 2023-02-02 PROCEDURE — 99223 1ST HOSP IP/OBS HIGH 75: CPT

## 2023-02-02 PROCEDURE — 99285 EMERGENCY DEPT VISIT HI MDM: CPT | Mod: GC

## 2023-02-02 PROCEDURE — 71045 X-RAY EXAM CHEST 1 VIEW: CPT | Mod: 26

## 2023-02-02 PROCEDURE — 93970 EXTREMITY STUDY: CPT | Mod: 26

## 2023-02-02 RX ORDER — CEPHALEXIN 500 MG
1 CAPSULE ORAL
Qty: 28 | Refills: 0
Start: 2023-02-02 | End: 2023-02-08

## 2023-02-02 RX ORDER — IRBESARTAN 75 MG/1
0 TABLET ORAL
Qty: 0 | Refills: 0 | DISCHARGE

## 2023-02-02 RX ORDER — SODIUM CHLORIDE 9 MG/ML
1000 INJECTION, SOLUTION INTRAVENOUS
Refills: 0 | Status: DISCONTINUED | OUTPATIENT
Start: 2023-02-02 | End: 2023-02-02

## 2023-02-02 RX ORDER — ACETAMINOPHEN 500 MG
650 TABLET ORAL EVERY 6 HOURS
Refills: 0 | Status: DISCONTINUED | OUTPATIENT
Start: 2023-02-02 | End: 2023-02-02

## 2023-02-02 RX ORDER — SODIUM CHLORIDE 9 MG/ML
1000 INJECTION INTRAMUSCULAR; INTRAVENOUS; SUBCUTANEOUS ONCE
Refills: 0 | Status: COMPLETED | OUTPATIENT
Start: 2023-02-02 | End: 2023-02-02

## 2023-02-02 RX ORDER — DEXTROSE 50 % IN WATER 50 %
25 SYRINGE (ML) INTRAVENOUS ONCE
Refills: 0 | Status: DISCONTINUED | OUTPATIENT
Start: 2023-02-02 | End: 2023-02-02

## 2023-02-02 RX ORDER — INSULIN LISPRO 100/ML
VIAL (ML) SUBCUTANEOUS
Refills: 0 | Status: DISCONTINUED | OUTPATIENT
Start: 2023-02-02 | End: 2023-02-02

## 2023-02-02 RX ORDER — DILTIAZEM HCL 120 MG
300 CAPSULE, EXT RELEASE 24 HR ORAL DAILY
Refills: 0 | Status: DISCONTINUED | OUTPATIENT
Start: 2023-02-02 | End: 2023-02-02

## 2023-02-02 RX ORDER — INSULIN LISPRO 100/ML
VIAL (ML) SUBCUTANEOUS AT BEDTIME
Refills: 0 | Status: DISCONTINUED | OUTPATIENT
Start: 2023-02-02 | End: 2023-02-02

## 2023-02-02 RX ORDER — LANOLIN ALCOHOL/MO/W.PET/CERES
3 CREAM (GRAM) TOPICAL AT BEDTIME
Refills: 0 | Status: DISCONTINUED | OUTPATIENT
Start: 2023-02-02 | End: 2023-02-02

## 2023-02-02 RX ORDER — DEXTROSE 50 % IN WATER 50 %
12.5 SYRINGE (ML) INTRAVENOUS ONCE
Refills: 0 | Status: DISCONTINUED | OUTPATIENT
Start: 2023-02-02 | End: 2023-02-02

## 2023-02-02 RX ORDER — GLUCAGON INJECTION, SOLUTION 0.5 MG/.1ML
1 INJECTION, SOLUTION SUBCUTANEOUS ONCE
Refills: 0 | Status: DISCONTINUED | OUTPATIENT
Start: 2023-02-02 | End: 2023-02-02

## 2023-02-02 RX ORDER — LOSARTAN POTASSIUM 100 MG/1
100 TABLET, FILM COATED ORAL DAILY
Refills: 0 | Status: DISCONTINUED | OUTPATIENT
Start: 2023-02-02 | End: 2023-02-02

## 2023-02-02 RX ORDER — CEFAZOLIN SODIUM 1 G
2000 VIAL (EA) INJECTION EVERY 8 HOURS
Refills: 0 | Status: DISCONTINUED | OUTPATIENT
Start: 2023-02-02 | End: 2023-02-02

## 2023-02-02 RX ORDER — DEXTROSE 50 % IN WATER 50 %
15 SYRINGE (ML) INTRAVENOUS ONCE
Refills: 0 | Status: DISCONTINUED | OUTPATIENT
Start: 2023-02-02 | End: 2023-02-02

## 2023-02-02 RX ORDER — VANCOMYCIN HCL 1 G
1000 VIAL (EA) INTRAVENOUS ONCE
Refills: 0 | Status: COMPLETED | OUTPATIENT
Start: 2023-02-02 | End: 2023-02-02

## 2023-02-02 RX ORDER — IPRATROPIUM/ALBUTEROL SULFATE 18-103MCG
3 AEROSOL WITH ADAPTER (GRAM) INHALATION EVERY 6 HOURS
Refills: 0 | Status: DISCONTINUED | OUTPATIENT
Start: 2023-02-02 | End: 2023-02-02

## 2023-02-02 RX ORDER — BUDESONIDE AND FORMOTEROL FUMARATE DIHYDRATE 160; 4.5 UG/1; UG/1
2 AEROSOL RESPIRATORY (INHALATION)
Refills: 0 | Status: DISCONTINUED | OUTPATIENT
Start: 2023-02-02 | End: 2023-02-02

## 2023-02-02 RX ORDER — DILTIAZEM HCL 120 MG
0 CAPSULE, EXT RELEASE 24 HR ORAL
Qty: 0 | Refills: 0 | DISCHARGE

## 2023-02-02 RX ORDER — ENOXAPARIN SODIUM 100 MG/ML
40 INJECTION SUBCUTANEOUS EVERY 24 HOURS
Refills: 0 | Status: DISCONTINUED | OUTPATIENT
Start: 2023-02-02 | End: 2023-02-02

## 2023-02-02 RX ADMIN — Medication 300 MILLIGRAM(S): at 09:00

## 2023-02-02 RX ADMIN — LOSARTAN POTASSIUM 100 MILLIGRAM(S): 100 TABLET, FILM COATED ORAL at 12:52

## 2023-02-02 RX ADMIN — Medication 2: at 17:14

## 2023-02-02 RX ADMIN — Medication 60 MILLIGRAM(S): at 09:01

## 2023-02-02 RX ADMIN — BUDESONIDE AND FORMOTEROL FUMARATE DIHYDRATE 2 PUFF(S): 160; 4.5 AEROSOL RESPIRATORY (INHALATION) at 09:01

## 2023-02-02 RX ADMIN — ENOXAPARIN SODIUM 40 MILLIGRAM(S): 100 INJECTION SUBCUTANEOUS at 12:52

## 2023-02-02 RX ADMIN — SODIUM CHLORIDE 1000 MILLILITER(S): 9 INJECTION INTRAMUSCULAR; INTRAVENOUS; SUBCUTANEOUS at 04:27

## 2023-02-02 RX ADMIN — Medication 250 MILLIGRAM(S): at 04:24

## 2023-02-02 RX ADMIN — SODIUM CHLORIDE 50 MILLILITER(S): 9 INJECTION, SOLUTION INTRAVENOUS at 14:02

## 2023-02-02 RX ADMIN — Medication 100 MILLIGRAM(S): at 12:51

## 2023-02-02 NOTE — H&P ADULT - PROBLEM SELECTOR PLAN 3
Nonpurulent cellulitis. Has lateral leg ulcer without drainage (chronic for 40 years per patient?)  -change to cefazolin  -wound care  -duplex neg for DVT    LE edema  Albumin normal. Check UA  echo ordered. Last normal one year ago

## 2023-02-02 NOTE — DISCHARGE NOTE PROVIDER - NSDCCPCAREPLAN_GEN_ALL_CORE_FT
PRINCIPAL DISCHARGE DIAGNOSIS  Diagnosis: Cellulitis  Assessment and Plan of Treatment: You have cellulitis on your left leg, you should get antibiotics in the IV but you wanted to leave the hospital. Please complete the antibiotics sent to your pharmacy.      SECONDARY DISCHARGE DIAGNOSES  Diagnosis: Hyponatremia  Assessment and Plan of Treatment: Your sodium was very low when you came to the hospital and increased very fast. Please limit how much you drink and please follow up with your primary doctor to monitor your sodium level.    Diagnosis: Acute asthma exacerbation  Assessment and Plan of Treatment: Continue to use your inahler as previously prescribed.     PRINCIPAL DISCHARGE DIAGNOSIS  Diagnosis: Cellulitis  Assessment and Plan of Treatment: You have cellulitis on your left leg, you should get antibiotics in the IV but you wanted to leave the hospital. Please complete the antibiotics sent to your pharmacy.      SECONDARY DISCHARGE DIAGNOSES  Diagnosis: Hyponatremia  Assessment and Plan of Treatment: Your sodium was very low when you came to the hospital and increased very fast. Please limit how much you drink and please follow up with your primary doctor to monitor your sodium level.    Diagnosis: Acute asthma exacerbation  Assessment and Plan of Treatment: Continue to use your inahler as previously prescribed.    Diagnosis: Left against medical advice  Assessment and Plan of Treatment: You left the hospital against medical advice.

## 2023-02-02 NOTE — H&P ADULT - NSHPREVIEWOFSYSTEMS_GEN_ALL_CORE
ROS:    Constitutional: [ ] fevers [ ] chills  HEENT: [ ] postnasal drip [ ] nasal congestion  CV: [ ] chest pain [ ] orthopnea [ ] palpitations   Resp: [x ] cough [x ] shortness of breath  [ x] wheezing   GI: [ ] nausea [ ] vomiting [ ] diarrhea [ ] constipation [ ] abd pain    : [ ] dysuria [ ] increased urinary frequency  Musculoskeletal: [ ] back pain [ ] myalgias [ ] arthralgias  Skin: [ ] rash [ ] itch [x] LLE swelling/redness and ulcer  Neurological: [ ] headache [ ] dizziness [ ] syncope   Endocrine: [ ] diabetes [ ] thyroid problem  Hematologic/Lymphatic: [ ] anemia [ ] bleeding problem  [x ] All other systems negative

## 2023-02-02 NOTE — H&P ADULT - PROBLEM SELECTOR PLAN 6
Does not know all meds. Per surescripts diltr 300mg and irbesartan 300mg which he confirms - continue these

## 2023-02-02 NOTE — H&P ADULT - PROBLEM SELECTOR PLAN 1
Na 118 up from 116 two weeks ago. Patient notes excess fluid intake as cause he was told by PMD. However he notes 2L fluids per day. Not clear if SSRI could be causing this. Not on thiazide  Has LE edema of unclear etiology but normal recent echo normal  -urine Na, serum osm, repeat BMP, TSH, cortisol  -nephrology consulted and will see patient  -fluid restriction 1L for now  -med rec needs to be completed. Hold SSRI for now

## 2023-02-02 NOTE — PROGRESS NOTE ADULT - PROBLEM SELECTOR PLAN 5
- A1C 6.4 in 1/2022, f/u A1C level  - not currently on meds  - sliding scale coverage while inpatient, monitor FS

## 2023-02-02 NOTE — H&P ADULT - TIME BILLING
I had a face to face encounter with this patient. I spent 80 total minutes on the bedside interview and examination, discussion with nephrology, coordination of care, counseling, chart review, order placement and documentation for this patient.

## 2023-02-02 NOTE — ED ADULT TRIAGE NOTE - CHIEF COMPLAINT QUOTE
Patient c/o SOB for a few hours. Endorses L lower extremity swelling. Respirations labored in triage. Denies CP/HA. No fevers/chills. PMH hypogammaglobulinemia, HTN, DM.

## 2023-02-02 NOTE — H&P ADULT - NSHPLABSRESULTS_GEN_ALL_CORE
Personally reviewed labs:                        12.7   10.19 )-----------( 205      ( 02 Feb 2023 02:36 )             38.9     02-02-23 @ 01:26    118<LL>  |  84<L>  |  7             --------------------------< 102<H>     5.2  |  23  | 0.48<L>    eGFR AA: --  eGFR N-AA: --    Calcium: 9.0  Phosphorus: --  Magnesium: --    AST: 46<H>    ALT: 35  AlkPhos: 177<H>  Protein: 6.3  Albumin: 3.5  TBili: 0.3  D-Bili: --    Troponin 17--20  Probnp 52      RADIOLOGY & ADDITIONAL TESTS:    EKG my independent interpretation: NSR, no ST changes    CXR my independent interpretation: no focal consolidation    Imaging personally reviewed:    Xray L tib/fib:  IMPRESSION:  No radiographic evidence of osteomyelitis.  No fracture or dislocation.

## 2023-02-02 NOTE — PROGRESS NOTE ADULT - PROBLEM SELECTOR PLAN 9
VTE ppx: lovenox    Dispo: pending treatment of infection, safe correction of hyponatremia and other medical optimization

## 2023-02-02 NOTE — PROGRESS NOTE ADULT - PROBLEM SELECTOR PLAN 8
- on monthly haldol injections  - also on paroxetine, with chronic hyponatremia, should followup with psych to discuss psychiatric regimen, balancing out potential side effects such as hyponatremia w/ SSRIs vs benefits of management of psychiatric symptoms

## 2023-02-02 NOTE — DISCHARGE NOTE PROVIDER - CARE PROVIDER_API CALL
Ketan Mcclendon)  Internal Medicine  [address] [place] [address], [facility] [address]  [place] [address]  Phone: [phone]  Fax: [phone]  Follow Up Time:

## 2023-02-02 NOTE — H&P ADULT - NSHPPHYSICALEXAM_GEN_ALL_CORE
PHYSICAL EXAM:  Vital Signs Last 24 Hrs  T(C): 36.7 (02-02-23 @ 04:30)  T(F): 98 (02-02-23 @ 04:30), Max: 98.3 (02-02-23 @ 00:31)  HR: 80 (02-02-23 @ 04:30) (76 - 80)  BP: 150/76 (02-02-23 @ 04:30)  BP(mean): --  RR: 20 (02-02-23 @ 04:30) (20 - 22)  SpO2: 95% (02-02-23 @ 04:30) (95% - 100%)  Wt(kg): --    Constitutional: NAD, awake and alert, well developed  EYES: EOMI, conjunctiva clear  ENT:  Normal Hearing, no tonsillar exudates   Neck: Soft and supple , no thyromegaly   Respiratory: wheezing noted throughout, No rales or rhonchi, no tachypnea, no accessory muscle use  Cardiovascular: S1 and S2, regular rate and rhythm, no Murmurs, gallops or rubs, no JVD, bilateral LE edema L>R  Gastrointestinal: Bowel Sounds present, soft, nontender, nondistended, no guarding, no rebound  Extremities: No cyanosis or clubbing; warm to touch  Vascular: 2+ peripheral pulses lower ex  Neurological: No focal deficits, CN II-XII intact bilaterally, sensation to light touch intact in all extremities.   Musculoskeletal: 5/5 strength b/l upper and lower extremities; no joint swelling.  Skin: LLE circumferential erythema, swelling TTP. LLE lateral ulcer noted without pus or drainage

## 2023-02-02 NOTE — PROGRESS NOTE ADULT - PROBLEM SELECTOR PLAN 1
- presenting with acute on chronic hyponatremia, appears asymptomatic, is AOx3, answering questions and following commands  - Na 118 on admission  - hyponatremia potentially secondary to SSRI use, urine Na 52, urine osm 267 suggestive of SIADH type picture, renal input appreciated, to place patient on fluid restriction of <1L/day  - TSH wnl  - echo w/ normal LV function,   had increased to 131 on repeat lab (per renal goal correction of no more that 6-8 meq/day), may need to use D5 IV fluids to address concerns for overcorrection  - continue monitoring Na level, continue to coordinate care with renal

## 2023-02-02 NOTE — ED PROVIDER NOTE - CLINICAL SUMMARY MEDICAL DECISION MAKING FREE TEXT BOX
58 y/o male w/ PMhx HTN, Bipolar (managed at [facility] w/ monthly haldol injections), hypogammaglobinemia, DM c/o 9-hour history of shortness of breath that began with walking, 1 week history of increasing left lower extremity swelling and redness.  Patient is admitting to difficulty breathing.   Intermittently, patient has tachypnea, but at other times, is resting comfortably, patient is saturating at 100% on room air.  Not tachycardic.  No recent immobilization or surgeries.  Lower concern for pulmonary embolism at this time.  Patient's left lower extremity physical examination is concerning for cellulitis.  Will x-ray to screen for gas tracking.  Will obtain bilateral ultrasound to screen for DVT.  Treat with antibiotics and admission at the very minimum for cellulitis and IV antibiotics given for outpatient follow-up. Will screen for ACS (troponin), BNP for CHF 2/2 bilateral pitting edema in LE, anemia/electrolytes, and chest x ray to screen for cardiopulmonary pathology.

## 2023-02-02 NOTE — PHARMACOTHERAPY INTERVENTION NOTE - COMMENTS
Patient unable to recall all of home medications. Medication list in Outpatient Medication Review (OMR) updated, fill information provided by outpatient pharmacy ([facility]) and [facility].   Of Note:   - Haldol Injection: 150mg IM once every 4 weeks (per vivo; pt was last seen and was given in clinic on 1/18/23).   - Mirtazapine 15mg (1/2 tablet QHS); last dispensed in October/2022 x 30 day supply.

## 2023-02-02 NOTE — ED ADULT NURSE NOTE - OBJECTIVE STATEMENT
Patient c/o SOB for a few hours. Endorses L lower extremity swelling. Respirations labored in triage. Denies CP/HA. No fevers/chills. PMH hypogammaglobulinemia, HTN, DM.  PT A&OX4.  PT noted with pursed lips breathing upon exhalation.  SpO2 97% on RA.  PT noted with Left leg redness and +3 edema, RLE +2 with no discoloration.  PT ambulatory, able to move all extremities.  Skin intact.  Will continue to monitor. Patient c/o SOB for a few hours. Endorses L lower extremity swelling. Respirations labored in triage. Denies CP/HA. No fevers/chills. PMH hypogammaglobulinemia, HTN, DM.  PT A&OX4.  PT noted with pursed lips breathing upon exhalation.  SpO2 97% on RA.  PT noted with Left leg redness and +3 edema, RLE +2 with no discoloration.  PT ambulatory, able to move all extremities.   PT noted with dry skin to B/L feet.  Labs sent.  Left 20G IVL in place and tolerating well.  Cardiac monitor in place.  Will continue to monitor.

## 2023-02-02 NOTE — DISCHARGE NOTE PROVIDER - NSDCMRMEDTOKEN_GEN_ALL_CORE_FT
cephalexin 500 mg oral tablet: 1 tab(s) orally 4 times a day   dilTIAZem 300 mg/24 hours oral capsule, extended release: 1 cap(s) orally once a day (in the morning)  Haldol Decanoate 100 mg/mL intramuscular solution: 150 milligram(s) intramuscularly every 4 weeks   Per [facility]: administered at clinic on 1/18/23   HYDROXYZINE HCL 25 MG TABLET: take 2 tablets by mouth at bedtime if needed  irbesartan 300 mg oral tablet: 1 tab(s) orally once a day  mirtazapine 15 mg oral tablet: 0.5 tab(s) orally once a day (at bedtime) (last dispensed in Oct/2022 x 1 month)  PARoxetine 30 mg oral tablet: 2 tab(s) orally once a day  rosuvastatin 10 mg oral tablet: 1 tab(s) orally once a day (Last dispensed 12/2022 x 90 day).   Trelegy Ellipta 100 mcg-62.5 mcg-25 mcg/inh inhalation powder: 1 puff(s) inhaled once a day (Filled Dec/2022 x 90 days) - [facility]).   Vitamin D3 50 mcg (2000 intl units) oral tablet: 1 tab(s) orally once a day   Albuterol (Eqv-ProAir HFA) 90 mcg/inh inhalation aerosol: 2 puff(s) inhaled every 6 hours, As Needed    Last filled 3/2022  Anoro Ellipta 62.5 mcg-25 mcg/inh inhalation powder: 1 puff(s) inhaled once a day    Last filled 6/10/22 but prescription was cancelled  cephalexin 500 mg oral tablet: 1 tab(s) orally 4 times a day   dilTIAZem 300 mg/24 hours oral capsule, extended release: 1 cap(s) orally once a day (in the morning)  Haldol Decanoate 100 mg/mL intramuscular solution: 150 milligram(s) intramuscularly every 4 weeks   Per [facility]: administered at clinic on 1/18/23   HYDROXYZINE HCL 25 MG TABLET: take 2 tablets by mouth at bedtime if needed  melatonin 3 mg oral tablet: 1 tab(s) orally once a day (at bedtime), As needed, Insomnia  rosuvastatin 10 mg oral tablet: 1 tab(s) orally once a day (Last dispensed 12/2022 x 90 day).   Vitamin D3 50 mcg (2000 intl units) oral tablet: 1 tab(s) orally once a day

## 2023-02-02 NOTE — PROGRESS NOTE ADULT - PROBLEM SELECTOR PLAN 4
- [facility] records reviewed, receives Gammagard IVIG infusion every 4 weeks, follows w/ AI Dr. Boxer as otpt, most recent visit on 1/9/23  - otpt followup

## 2023-02-02 NOTE — DISCHARGE NOTE NURSING/CASE MANAGEMENT/SOCIAL WORK - PATIENT PORTAL LINK FT
You can access the FollowMyHealth Patient Portal offered by [facility] by registering at the following website: [email or web address]. By joining [facility]’s FollowMyHealth portal, you will also be able to view your health information using other applications (apps) compatible with our system.

## 2023-02-02 NOTE — ED ADULT NURSE NOTE - NS ED NURSE LEVEL OF CONSCIOUSNESS AFFECT
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  Shantel stated she had to take over the paying of pt's bills and book work due to memory issues. Shantel stated she needs the letter for [employer] insurance so that   the pt's statements can be mailed to Shantel as pt is living at [facility] in [place]. Shantel stated she would like the letter mailed to her.    Caller: DaughterShantel    Timeframe for callback: Today      Pharmacy information verified:  No       Phone number to be reached at: [phone] (home)     Calm/Appropriate

## 2023-02-02 NOTE — CONSULT NOTE ADULT - ATTENDING COMMENTS
Pt. with acute on chronic hyponatremia. SNa low at 118 today. Assessment and plan for hyponatremia as outlined above. Monitor SNa daily. Avoid overcorrection of SNa.

## 2023-02-02 NOTE — DISCHARGE NOTE NURSING/CASE MANAGEMENT/SOCIAL WORK - NSPROMEDSRETURNEDYESNO_GEN_A_NUR
medication remained at bedside with patient. Patient AOX4, verbalized that all belongings remained in tact

## 2023-02-02 NOTE — PATIENT PROFILE ADULT - NS PRO AD ANY ON CHART
· Patient was admitted in [facility] for chest pain and nonhealing wound of the left lower extremity  · Underwent cardiac catheterization, found to have triple-vessel disease  · Continue with current medical management including aspirin, statin  · For CABG on Wednesday with CT surgery team No

## 2023-02-02 NOTE — PROGRESS NOTE ADULT - SUBJECTIVE AND OBJECTIVE BOX
[facility] Medicine  Pager [phone]    Patient is a 57y old  Male who presents with a chief complaint of SOB x1 day, LLE pain/erythema (02 Feb 2023 10:28)      INTERVAL HPI/OVERNIGHT EVENTS:    MEDICATIONS  (STANDING):  albuterol/ipratropium for Nebulization 3 milliLiter(s) Nebulizer every 6 hours  budesonide  80 MICROgram(s)/formoterol 4.5 MICROgram(s) Inhaler 2 Puff(s) Inhalation two times a day  ceFAZolin   IVPB 2000 milliGRAM(s) IV Intermittent every 8 hours  dextrose 5%. 1000 milliLiter(s) (50 mL/Hr) IV Continuous <Continuous>  dextrose 5%. 1000 milliLiter(s) (100 mL/Hr) IV Continuous <Continuous>  dextrose 5%. 1000 milliLiter(s) (50 mL/Hr) IV Continuous <Continuous>  dextrose 50% Injectable 25 Gram(s) IV Push once  dextrose 50% Injectable 12.5 Gram(s) IV Push once  dextrose 50% Injectable 25 Gram(s) IV Push once  diltiazem    milliGRAM(s) Oral daily  enoxaparin Injectable 40 milliGRAM(s) SubCutaneous every 24 hours  glucagon  Injectable 1 milliGRAM(s) IntraMuscular once  insulin lispro (ADMELOG) corrective regimen sliding scale   SubCutaneous three times a day before meals  insulin lispro (ADMELOG) corrective regimen sliding scale   SubCutaneous at bedtime  losartan 100 milliGRAM(s) Oral daily    MEDICATIONS  (PRN):  acetaminophen     Tablet .. 650 milliGRAM(s) Oral every 6 hours PRN Temp greater or equal to 38C (100.4F), Mild Pain (1 - 3)  dextrose Oral Gel 15 Gram(s) Oral once PRN Blood Glucose LESS THAN 70 milliGRAM(s)/deciliter  melatonin 3 milliGRAM(s) Oral at bedtime PRN Insomnia      Allergies    amoxicillin (Fever)  penicillin (Rash)    Intolerances        REVIEW OF SYSTEMS:  Please see interval HPI:    Vital Signs Last 24 Hrs  T(C): 36.7 (02 Feb 2023 09:00), Max: 36.8 (02 Feb 2023 00:31)  T(F): 98.1 (02 Feb 2023 09:00), Max: 98.3 (02 Feb 2023 00:31)  HR: 82 (02 Feb 2023 12:57) (76 - 86)  BP: 148/68 (02 Feb 2023 12:57) (148/68 - 192/103)  BP(mean): --  RR: 18 (02 Feb 2023 12:57) (14 - 22)  SpO2: 95% (02 Feb 2023 12:57) (89% - 100%)    Parameters below as of 02 Feb 2023 12:57  Patient On (Oxygen Delivery Method): nasal cannula  O2 Flow (L/min): 2    I&O's Detail        PHYSICAL EXAM:  GENERAL:   HEAD:    EYES:   ENMT:   NECK:   NERVOUS SYSTEM:    CHEST/LUNG:   HEART:   ABDOMEN:   EXTREMITIES:    LYMPH:   SKIN:     LABS:                        12.7   10.19 )-----------( 205      ( 02 Feb 2023 02:36 )             38.9     02 Feb 2023 10:14    131    |  92     |  8      ----------------------------<  143    4.1     |  32     |  0.64     Ca    9.1        02 Feb 2023 10:14    TPro  6.2    /  Alb  3.4    /  TBili  0.2    /  DBili  x      /  AST  21     /  ALT  28     /  AlkPhos  165    02 Feb 2023 10:14      CAPILLARY BLOOD GLUCOSE      POCT Blood Glucose.: 154 mg/dL (02 Feb 2023 13:10)  POCT Blood Glucose.: 181 mg/dL (02 Feb 2023 08:57)  POCT Blood Glucose.: 114 mg/dL (02 Feb 2023 00:38)    BLOOD CULTURE    RADIOLOGY & ADDITIONAL TESTS:    Imaging Personally Reviewed:  [ ] YES     Consultant(s) Notes Reviewed:      Care Discussed with Consultants/Other Providers: [facility] Medicine  Pager [phone]    Patient is a 57y old  Male who presents with a chief complaint of SOB x1 day, LLE pain/erythema (02 Feb 2023 10:28)      INTERVAL HPI/OVERNIGHT EVENTS:  Seen in the ED, just returned from imaging study. Denies any fever/chills at this time. Is orientated to date (knew Feb 2nd 2023) and location ([facility]). Denies any current SOB. Discussed plan to continue antibiotics given concern for LLE cellulitis (admits that his feet are "dirty" and that he needs to take better care of himself) and need to work to correct hyponatremia (with close monitoring of Na levels and nephrology assistance). Patient asks for additional soda, but discussed renal recommendations for fluid restriction. No other questions at this time.      MEDICATIONS  (STANDING):  albuterol/ipratropium for Nebulization 3 milliLiter(s) Nebulizer every 6 hours  budesonide  80 MICROgram(s)/formoterol 4.5 MICROgram(s) Inhaler 2 Puff(s) Inhalation two times a day  ceFAZolin   IVPB 2000 milliGRAM(s) IV Intermittent every 8 hours  dextrose 5%. 1000 milliLiter(s) (50 mL/Hr) IV Continuous <Continuous>  dextrose 5%. 1000 milliLiter(s) (100 mL/Hr) IV Continuous <Continuous>  dextrose 5%. 1000 milliLiter(s) (50 mL/Hr) IV Continuous <Continuous>  dextrose 50% Injectable 25 Gram(s) IV Push once  dextrose 50% Injectable 12.5 Gram(s) IV Push once  dextrose 50% Injectable 25 Gram(s) IV Push once  diltiazem    milliGRAM(s) Oral daily  enoxaparin Injectable 40 milliGRAM(s) SubCutaneous every 24 hours  glucagon  Injectable 1 milliGRAM(s) IntraMuscular once  insulin lispro (ADMELOG) corrective regimen sliding scale   SubCutaneous three times a day before meals  insulin lispro (ADMELOG) corrective regimen sliding scale   SubCutaneous at bedtime  losartan 100 milliGRAM(s) Oral daily    MEDICATIONS  (PRN):  acetaminophen     Tablet .. 650 milliGRAM(s) Oral every 6 hours PRN Temp greater or equal to 38C (100.4F), Mild Pain (1 - 3)  dextrose Oral Gel 15 Gram(s) Oral once PRN Blood Glucose LESS THAN 70 milliGRAM(s)/deciliter  melatonin 3 milliGRAM(s) Oral at bedtime PRN Insomnia      Allergies    amoxicillin (Fever)  penicillin (Rash)    Intolerances      REVIEW OF SYSTEMS:  Please see interval HPI:    Vital Signs Last 24 Hrs  T(C): 36.7 (02 Feb 2023 09:00), Max: 36.8 (02 Feb 2023 00:31)  T(F): 98.1 (02 Feb 2023 09:00), Max: 98.3 (02 Feb 2023 00:31)  HR: 82 (02 Feb 2023 12:57) (76 - 86)  BP: 148/68 (02 Feb 2023 12:57) (148/68 - 192/103)  BP(mean): --  RR: 18 (02 Feb 2023 12:57) (14 - 22)  SpO2: 95% (02 Feb 2023 12:57) (89% - 100%)    Parameters below as of 02 Feb 2023 12:57  Patient On (Oxygen Delivery Method): nasal cannula  O2 Flow (L/min): 2    I&O's Detail        PHYSICAL EXAM:  GENERAL: NAD, lying in ED [facility]  HEAD: NC/AT    EYES: EOMI, clear sclera/conjunctiva  ENMT: MMM, hearing intact to voice, has NC down by his chest  NECK: supple, no JVD  NERVOUS SYSTEM:  moving extremities, SILT grossly  CHEST/LUNG: clear to ausculation, no wheezing appreciated, comfortable on RA, speaking in full sentences  HEART: S1S2 RRR  ABDOMEN: soft, non-tender +BS  EXTREMITIES:  no clubbing/cyanosis, +LLE erythema and edema, feet with callouses, appears unwashed    LABS:                        12.7   10.19 )-----------( 205      ( 02 Feb 2023 02:36 )             38.9     02 Feb 2023 10:14    131    |  92     |  8      ----------------------------<  143    4.1     |  32     |  0.64     Ca    9.1        02 Feb 2023 10:14    TPro  6.2    /  Alb  3.4    /  TBili  0.2    /  DBili  x      /  AST  21     /  ALT  28     /  AlkPhos  165    02 Feb 2023 10:14      Na trend 118 -> 131  TSH 1.97  urine Na 52, urine osm 267    CAPILLARY BLOOD GLUCOSE  POCT Blood Glucose.: 154 mg/dL (02 Feb 2023 13:10)  POCT Blood Glucose.: 181 mg/dL (02 Feb 2023 08:57)  POCT Blood Glucose.: 114 mg/dL (02 Feb 2023 00:38)    BLOOD CULTURE    RADIOLOGY & ADDITIONAL TESTS:    Imaging Personally Reviewed:  [ ] YES   < from: Transthoracic Echocardiogram (02.02.23 @ 11:28) >  Ejection Fraction (Modified Saxena Rule): 58 %  ------------------------------------------------------------------------  OBSERVATIONS:  Mitral Valve: Normal mitral valve. No mitral valve  regurgitation seen.  Aortic Root: Normal aortic root.  Aortic Valve: Normal trileaflet aortic valve.  Left Atrium: Normal left atrium.  LA volume index = 21  cc/m2.  Left Ventricle: Normal left ventricular systolic function.  No segmental wall motion abnormalities. Normal left  ventricular internal dimensions and wall thicknesses.  Right Heart: Normal right atrium. Normal right ventricular  size and function. Normal tricuspid valve. Normal pulmonic  valve.  Pericardium/PleuraNormal pericardium with no pericardial  effusion.  ------------------------------------------------------------------------  CONCLUSIONS:  1. Normal trileaflet aortic valve.  2. Normal left ventricular internal dimensions and wall  thicknesses.  3. Normal left ventricular systolic function. No segmental  wall motion abnormalities.  4. Normal right ventricular size and function.    < end of copied text >  < from: US Duplex Venous Lower Ext Complete, Bilateral (02.02.23 @ 03:32) >  IMPRESSION:  No evidence of deepvenous thrombosis in either lower extremity.    < end of copied text >  < from: Xray Tibia + Fibula 2 Views, Left (02.02.23 @ 02:38) >  IMPRESSION:  No radiographic evidence of osteomyelitis.  No fracture or dislocation.    < end of copied text >  < from: Xray Chest 1 View- PORTABLE-Urgent (02.02.23 @ 02:38) >  IMPRESSION:  No localized pulmonary disease.    < end of copied text >      Consultant(s) Notes Reviewed:  Renal    Care Discussed with Consultants/Other Providers: ALISHA Ojeda re: monitoring Na level closely to avoid overcorrection, if rises too quickly may need to use D5 IV fluids, and coordinate with renal, on abx for LLE cellulitis

## 2023-02-02 NOTE — PROGRESS NOTE ADULT - PROBLEM SELECTOR PLAN 6
- on losartan (irbesartan therapeutic interchange), diltiazem  - goal SBP <130 given hx diabetes  - monitor BP and adjust regimen as needed

## 2023-02-02 NOTE — PATIENT PROFILE ADULT - FALL HARM RISK - RISK INTERVENTIONS

## 2023-02-02 NOTE — DISCHARGE NOTE PROVIDER - NSDCFUSCHEDAPPT_GEN_ALL_CORE_FT
[facility]  [address]  Scheduled Appointment: 02/09/2023    [facility]  [address]  Scheduled Appointment: 03/09/2023    [facility]  [address]  Scheduled Appointment: 04/06/2023     [facility]  [address]  Scheduled Appointment: 03/09/2023    Nichelle Diaz  [facility]  WOUNDCARE [address]  Scheduled Appointment: 03/14/2023    [facility]  [address]  Scheduled Appointment: 04/06/2023    [facility]  [address]  Scheduled Appointment: 05/04/2023    Boxer, Mitchell B  [facility]  ALLERGYIM [address]  Scheduled Appointment: 05/10/2023

## 2023-02-02 NOTE — ED PROVIDER NOTE - PHYSICAL EXAMINATION
GENERAL: NAD  HEENT:  Atraumatic  CHEST/LUNG: Chest rise equal bilaterally  HEART: Regular rate and rhythm  ABDOMEN: Soft, Nontender, Nondistended  EXTREMITIES:  Extremities warm. Erythematous LLE w/ ulcer w/o purulent drainage. No necrosis, no crepitus, blanching erythema. +2 bilateral pitting edema in LE.   PSYCH: A&Ox3  SKIN: No obvious rashes or lesions  NEUROLOGY: strength and sensation intact in all extremities. Ambulatory without difficulty.

## 2023-02-02 NOTE — H&P ADULT - HISTORY OF PRESENT ILLNESS
57M with PMHx HTN, bipolar d/o, hypogammaglobulinemia, DM, asthma, chronic hyponatremia presenting with SOB x1 day and LLE pain. Patient was on the bus and felt SOB worse with exertion today. He feels like his SOB is sometimes related to anxiety. He has a dry chronic cough and hx of asthma. Not clear if consistent with inhaler use. Denies fevers, chills, sore throat, CP. He also noted worsened LE edema L>R and erythema. He has a lateral ulcer on the LLE that has been there for 40 years per his history. No drainage or pus. Due to pain, swelling and erythema he presented to ED. Of note patient was here two weeks ago for lethargy after getting haldol injection. He was hyponatremic to 116 and seen by renal in ED however patient left AMA once more awakened. He states he has chronic hyponatremia that he was told was due to excess fluid intake and has been advised to limit his fluid intake. He states he drinks about 2L per day. His PMD is Dr. Ketan Mcclendon and he believes his last Na checked with her was 126 on 1/4.

## 2023-02-02 NOTE — ED PROVIDER NOTE - OBJECTIVE STATEMENT
56 y/o male w/ PMhx HTN, Bipolar (managed at [facility] w/ monthly haldol injections), hypogammaglobinemia, DM c/o 56 y/o male w/ PMhx HTN, Bipolar (managed at [facility] w/ monthly haldol injections), hypogammaglobinemia, DM c/o 9-hour history of shortness of breath that began with walking, 1 week history of increasing left lower extremity swelling and redness.  Patient is admitting to difficulty breathing. Denies fevers, chills, nausea, vomiting, dizziness, chest pain, abdominal pain, dysuria, hematuria.

## 2023-02-02 NOTE — CONSULT NOTE ADULT - SUBJECTIVE AND OBJECTIVE BOX
[facility] DIVISION OF KIDNEY DISEASES AND HYPERTENSION -- [phone]  -- INITIAL CONSULT NOTE  --------------------------------------------------------------------------------  HPI: 58 yo M with h/o chronic hyponatremia, DM, asthma, and hypogammaglobulinemia initially presented with SOB, and LLE pain and swelling. Pt was admitted for management of LLE cellulitis. Upon review of [place], pt has multiple episodes of hyponatremia. SNa was 129 on 10/4/22. SNa was 116 during ER visit on 1/18/23 where he left AMA. SNa is low at 118 today.    Pt was seen and evaluated in the ER. Pt reports a history of chronic hyponatremia which was attributed to excess fluid intake. He states that his SNa is typically ~126. Pt was advised by PCP to restrict fluid intake. However, he reports drinking a lot of water over the past few days. He also endorses LLE pain and redness worsening over the past day. Pt reports that he has chronic LLE swelling for the past ~2 years. Denies any headaches, nausea, vomiting, chest pain, palpitations, SOB, and abdominal pain.      PAST HISTORY  --------------------------------------------------------------------------------  PAST MEDICAL & SURGICAL HISTORY:  Smoker  DM (diabetes mellitus)  HTN (hypertension)  Hypogammaglobulinemia  Abscess of finger  Deviated septum  Loss of teeth due to extraction  All teeth due to dental carries    FAMILY HISTORY:  Family history of throat cancer (Father)    Family history of leukemia (Mother)      PAST SOCIAL HISTORY: +Tobacco use    ALLERGIES & MEDICATIONS  --------------------------------------------------------------------------------  Allergies    amoxicillin (Fever)  penicillin (Rash)    Intolerances      Standing Inpatient Medications  albuterol/ipratropium for Nebulization 3 milliLiter(s) Nebulizer every 6 hours  budesonide  80 MICROgram(s)/formoterol 4.5 MICROgram(s) Inhaler 2 Puff(s) Inhalation two times a day  ceFAZolin   IVPB 2000 milliGRAM(s) IV Intermittent every 8 hours  diltiazem    milliGRAM(s) Oral daily  enoxaparin Injectable 40 milliGRAM(s) SubCutaneous every 24 hours  losartan 100 milliGRAM(s) Oral daily    PRN Inpatient Medications  acetaminophen     Tablet .. 650 milliGRAM(s) Oral every 6 hours PRN  melatonin 3 milliGRAM(s) Oral at bedtime PRN      REVIEW OF SYSTEMS  --------------------------------------------------------------------------------  Gen: No fevers/chills  Skin: No rashes  Respiratory: No dyspnea, cough  CV: No chest pain  GI: No abdominal pain, diarrhea  : No dysuria, hematuria  MSK: +chronic LLE edema, +pain and swelling of LLE  Heme: No easy bruising or bleeding  Psych: No significant depression    All other systems were reviewed and are negative, except as noted.    VITALS/PHYSICAL EXAM  --------------------------------------------------------------------------------  T(C): 36.7 (02-02-23 @ 09:00), Max: 36.8 (02-02-23 @ 00:31)  HR: 79 (02-02-23 @ 09:00) (76 - 86)  BP: 166/93 (02-02-23 @ 09:00) (150/76 - 192/103)  RR: 14 (02-02-23 @ 09:00) (14 - 22)  SpO2: 96% (02-02-23 @ 09:00) (89% - 100%)  Wt(kg): --    Physical Exam:  Gen: Resting, NAD  HEENT: Dry mucous membranes  Pulm: CTABL  CV: S1S2  Abd: Soft, +BS   Ext: +LLE pitting edema. +redness, warmth, and erythema of LLE  Neuro: Awake and alert  Skin: Warm and dry  Vascular access: Peripheral IV    LABS/STUDIES  --------------------------------------------------------------------------------              12.7   10.19 >-----------<  205      [02-02-23 @ 02:36]              38.9     118  |  84  |  7   ----------------------------<  102      [02-02-23 @ 01:26]  5.2   |  23  |  0.48        Ca     9.0     [02-02-23 @ 01:26]    TPro  6.3  /  Alb  3.5  /  TBili  0.3  /  DBili  x   /  AST  46  /  ALT  35  /  AlkPhos  177  [02-02-23 @ 01:26]    Creatinine Trend:  SCr 0.48 [02-02 @ 01:26]  SCr 0.50 [01-18 @ 14:51]    Urinalysis - [02-02-23 @ 09:58]      Color Light Yellow / Appearance Clear / SG 1.010 / pH 7.5      Gluc Negative / Ketone Negative  / Bili Negative / Urobili <2 mg/dL       Blood Negative / Protein 30 mg/dL / Leuk Est Negative / Nitrite Negative      RBC 1 / WBC 0 / Hyaline  / Gran  / Sq Epi  / Non Sq Epi 1 / Bacteria Negative    Urine Osmolality 267      [02-02-23 @ 09:58]    HbA1c 5.4      [08-09-18 @ 10:55]  TSH 4.53      [01-18-23 @ 14:51] [facility] DIVISION OF KIDNEY DISEASES AND HYPERTENSION -- [phone]  -- INITIAL CONSULT NOTE  --------------------------------------------------------------------------------  HPI: 56 yo M with history of chronic hyponatremia, DM, asthma, and hypogammaglobulinemia initially presented with SOB, and LLE pain and swelling. Pt was admitted for management of LLE cellulitis. Upon review of [place], pt noted to have hyponatremia in the past. SNa was 129 on 10/4/22. SNa was low at 116 during ER visit on 1/18/23 (pt. however left AMA). SNa is low at 118 today.    Pt was seen and evaluated in the ER. Pt reports a history of chronic hyponatremia which was attributed to excess fluid intake. He states that his SNa is typically ~126. Pt was advised by PCP to restrict fluid intake. However, he reports drinking a lot of water over the past few days. He also endorses LLE pain and redness worsening over the past day. Pt reports that he has chronic LLE swelling for the past ~2 years. Denies any headaches, nausea, vomiting, chest pain, palpitations, SOB, and abdominal pain.      PAST HISTORY  --------------------------------------------------------------------------------  PAST MEDICAL & SURGICAL HISTORY:  Smoker  DM (diabetes mellitus)  HTN (hypertension)  Hypogammaglobulinemia  Abscess of finger  Deviated septum  Loss of teeth due to extraction  All teeth due to dental carries    FAMILY HISTORY:  Family history of throat cancer (Father)    Family history of leukemia (Mother)    PAST SOCIAL HISTORY: +Tobacco use    ALLERGIES & MEDICATIONS  --------------------------------------------------------------------------------  Allergies    amoxicillin (Fever)  penicillin (Rash)    Intolerances    Standing Inpatient Medications  albuterol/ipratropium for Nebulization 3 milliLiter(s) Nebulizer every 6 hours  budesonide  80 MICROgram(s)/formoterol 4.5 MICROgram(s) Inhaler 2 Puff(s) Inhalation two times a day  ceFAZolin   IVPB 2000 milliGRAM(s) IV Intermittent every 8 hours  diltiazem    milliGRAM(s) Oral daily  enoxaparin Injectable 40 milliGRAM(s) SubCutaneous every 24 hours  losartan 100 milliGRAM(s) Oral daily    PRN Inpatient Medications  acetaminophen     Tablet .. 650 milliGRAM(s) Oral every 6 hours PRN  melatonin 3 milliGRAM(s) Oral at bedtime PRN    REVIEW OF SYSTEMS  --------------------------------------------------------------------------------  Gen: No fever  Skin: No rash  Respiratory: No dyspnea, cough  CV: No chest pain  GI: No abdominal pain, diarrhea  : No dysuria, hematuria  MSK: +chronic LLE edema, +pain, swelling, redness of LLE  Heme: No easy bruising or bleeding  Psych: No significant depression    All other systems were reviewed and are negative, except as noted.    VITALS/PHYSICAL EXAM  --------------------------------------------------------------------------------  T(C): 36.7 (02-02-23 @ 09:00), Max: 36.8 (02-02-23 @ 00:31)  HR: 79 (02-02-23 @ 09:00) (76 - 86)  BP: 166/93 (02-02-23 @ 09:00) (150/76 - 192/103)  RR: 14 (02-02-23 @ 09:00) (14 - 22)  SpO2: 96% (02-02-23 @ 09:00) (89% - 100%)  Wt(kg): --    Physical Exam:    Gen: Resting, NAD  HEENT: Dry mucous membranes  Pulm: CTA B/L  CV: S1S2  Abd: Soft, +BS   Ext: LLE: pitting edema, redness/erythema seen, RLE: no edema  Neuro: Awake and alert  Skin: Chronic skin changes in B/L feet, redness of LLE  Vascular access: Peripheral IV line    LABS/STUDIES  --------------------------------------------------------------------------------              12.7   10.19 >-----------<  205      [02-02-23 @ 02:36]              38.9     118  |  84  |  7   ----------------------------<  102      [02-02-23 @ 01:26]  5.2   |  23  |  0.48        Ca     9.0     [02-02-23 @ 01:26]    TPro  6.3  /  Alb  3.5  /  TBili  0.3  /  DBili  x   /  AST  46  /  ALT  35  /  AlkPhos  177  [02-02-23 @ 01:26]    Creatinine Trend:  SCr 0.48 [02-02 @ 01:26]  SCr 0.50 [01-18 @ 14:51]    Urinalysis - [02-02-23 @ 09:58]      Color Light Yellow / Appearance Clear / SG 1.010 / pH 7.5      Gluc Negative / Ketone Negative  / Bili Negative / Urobili <2 mg/dL       Blood Negative / Protein 30 mg/dL / Leuk Est Negative / Nitrite Negative      RBC 1 / WBC 0 / Hyaline  / Gran  / Sq Epi  / Non Sq Epi 1 / Bacteria Negative    Urine Osmolality 267      [02-02-23 @ 09:58]    HbA1c 5.4      [08-09-18 @ 10:55]  TSH 4.53      [01-18-23 @ 14:51] [facility] DIVISION OF KIDNEY DISEASES AND HYPERTENSION -- [phone]  -- INITIAL CONSULT NOTE  --------------------------------------------------------------------------------  HPI: 58 yo M with history of chronic hyponatremia, DM, asthma, and hypogammaglobulinemia initially presented with SOB, and LLE pain and swelling. Pt was admitted for management of LLE cellulitis. Upon review of [place], pt noted to have hyponatremia in the past. SNa was 129 on 10/4/22. SNa was low at 116 during ER visit on 1/18/23 (pt. however left AMA). SNa is low at 118 today.    Pt was seen and evaluated in the ER. Pt reports a history of chronic hyponatremia which was attributed to excess fluid intake. He states that his SNa is typically ~126. Pt was advised by PCP to restrict fluid intake. However, he reports drinking a lot of water over the past few days. He also endorses LLE pain and redness worsening over the past day. Pt reports that he has chronic LLE swelling for the past ~2 years. Denies any headaches, nausea, vomiting, chest pain, palpitations, SOB, and abdominal pain.      PAST HISTORY  --------------------------------------------------------------------------------  PAST MEDICAL & SURGICAL HISTORY:  Smoker  DM (diabetes mellitus)  HTN (hypertension)  Hypogammaglobulinemia  Abscess of finger  Deviated septum  Loss of teeth due to extraction  All teeth due to dental carries    FAMILY HISTORY:  Family history of throat cancer (Father)    Family history of leukemia (Mother)    PAST SOCIAL HISTORY: +Tobacco use    ALLERGIES & MEDICATIONS  --------------------------------------------------------------------------------  Allergies    amoxicillin (Fever)  penicillin (Rash)    Intolerances    Standing Inpatient Medications  albuterol/ipratropium for Nebulization 3 milliLiter(s) Nebulizer every 6 hours  budesonide  80 MICROgram(s)/formoterol 4.5 MICROgram(s) Inhaler 2 Puff(s) Inhalation two times a day  ceFAZolin   IVPB 2000 milliGRAM(s) IV Intermittent every 8 hours  diltiazem    milliGRAM(s) Oral daily  enoxaparin Injectable 40 milliGRAM(s) SubCutaneous every 24 hours  losartan 100 milliGRAM(s) Oral daily    PRN Inpatient Medications  acetaminophen     Tablet .. 650 milliGRAM(s) Oral every 6 hours PRN  melatonin 3 milliGRAM(s) Oral at bedtime PRN    REVIEW OF SYSTEMS  --------------------------------------------------------------------------------  Gen: No fever  Skin: No rash  Respiratory: No dyspnea, cough  CV: No chest pain  GI: No abdominal pain, diarrhea  : No dysuria, hematuria  MSK: +chronic LLE edema, +pain, swelling, redness of LLE  Heme: No easy bruising or bleeding  Psych: No significant depression    All other systems were reviewed and are negative, except as noted.    VITALS/PHYSICAL EXAM  --------------------------------------------------------------------------------  T(C): 36.7 (02-02-23 @ 09:00), Max: 36.8 (02-02-23 @ 00:31)  HR: 79 (02-02-23 @ 09:00) (76 - 86)  BP: 166/93 (02-02-23 @ 09:00) (150/76 - 192/103)  RR: 14 (02-02-23 @ 09:00) (14 - 22)  SpO2: 96% (02-02-23 @ 09:00) (89% - 100%)  Wt(kg): --    Physical Exam:    Gen: Resting, NAD  HEENT: MMM  Pulm: CTA B/L  CV: S1S2  Abd: Soft, +BS   Ext: LLE: pitting edema, redness/erythema seen, RLE: no edema  Neuro: Awake and alert  Skin: Chronic skin changes in B/L feet, redness of LLE  Vascular access: Peripheral IV line    LABS/STUDIES  --------------------------------------------------------------------------------              12.7   10.19 >-----------<  205      [02-02-23 @ 02:36]              38.9     118  |  84  |  7   ----------------------------<  102      [02-02-23 @ 01:26]  5.2   |  23  |  0.48        Ca     9.0     [02-02-23 @ 01:26]    TPro  6.3  /  Alb  3.5  /  TBili  0.3  /  DBili  x   /  AST  46  /  ALT  35  /  AlkPhos  177  [02-02-23 @ 01:26]    Creatinine Trend:  SCr 0.48 [02-02 @ 01:26]  SCr 0.50 [01-18 @ 14:51]    Urinalysis - [02-02-23 @ 09:58]      Color Light Yellow / Appearance Clear / SG 1.010 / pH 7.5      Gluc Negative / Ketone Negative  / Bili Negative / Urobili <2 mg/dL       Blood Negative / Protein 30 mg/dL / Leuk Est Negative / Nitrite Negative      RBC 1 / WBC 0 / Hyaline  / Gran  / Sq Epi  / Non Sq Epi 1 / Bacteria Negative    Urine Osmolality 267      [02-02-23 @ 09:58]    HbA1c 5.4      [08-09-18 @ 10:55]  TSH 4.53      [01-18-23 @ 14:51]

## 2023-02-02 NOTE — PROVIDER CONTACT NOTE (OTHER) - SITUATION
Pt 89% on RA, 4L NC applied. Now satting 94%+
Pt refused AM labwork. Pt wants to re-attempt at a later time.

## 2023-02-02 NOTE — H&P ADULT - ASSESSMENT
57M with PMHx HTN, bipolar d/o, hypogammaglobulinemia, DM, asthma, chronic hyponatremia presenting with SOB x1 day and LLE pain. Admitted with asthma exacerbation, LLE cellulitis and hyponatremia

## 2023-02-02 NOTE — DISCHARGE NOTE PROVIDER - HOSPITAL COURSE
57M with PMHx HTN, bipolar d/o, hypogammaglobulinemia, DM, asthma, chronic hyponatremia presenting with SOB x1 day and LLE pain. Admitted with asthma exacerbation, LLE cellulitis and hyponatremia      Problem/Plan - 1:  ·  Problem: Hyponatremia.   ·  Plan: Na 118 up from 116 two weeks ago. Patient notes excess fluid intake as cause he was told by PMD. However he notes 2L fluids per day. Not clear if SSRI could be causing this. Not on thiazide  Has LE edema of unclear etiology but normal recent echo normal  -nephrology followed during admission  - Sodium increased to 131, started on D5 per nephrology recs and plan was to moniotor sodium.   -fluid restriction 1L for now    Problem/Plan - 2:  ·  Problem: Acute asthma exacerbation.   ·  Plan: Wheezing noted on exam. Not tachypneic, no accessory muscle use  -duonebs, prednisone ordered  -ICS.    Problem/Plan - 3:  ·  Problem: Cellulitis.   ·  Plan: Nonpurulent cellulitis. Has lateral leg ulcer without drainage (chronic for 40 years per patient?)  - Received IV Cefazolin, will discharge on Keflex  -wound care was ordered but patient left prior to eval being completed.   -duplex neg for DVT    LE edema  Albumin normal. Check UA  echo ordered. Last normal one year ago.    Problem/Plan - 4:  ·  Problem: Hypogammaglobulinemia.   ·  Plan: States follows with Dr. Boxer for injections monthly for this.    Problem/Plan - 5:  ·  Problem: Type 2 diabetes mellitus with hyperglycemia, without long-term current use of insulin.   ·  Plan: Not on meds. Check A1C. A1C 6.4 one year ago.    Problem/Plan - 6:  ·  Problem: HTN (hypertension).   ·  Plan: Does not know all meds. Per surescripts diltr 300mg and irbesartan 300mg which he confirms - continue these.    Problem/Plan - 7:  ·  Problem: Medication management.   ·  Plan: Does not know all meds  [facility] emailed.    Problem/Plan - 8:  ·  Problem: Bipolar disorder.   ·  Plan: Hold SSRI for now  On monthly haldol injections.      Called by nurse to evaluate patient who wishes to leave the hospital against medical advice. Patient is A&O x3 and has full capacity to make his or her own medical decisions. Pt was informed of their medical conditions, benefits, and alternatives to treatment as well as the risks of refusing treatment and the seriousness of leaving against medical advice such as the risks of heart attack, stroke, seizure, and even death were fully explained to the patient.  After expressing full understanding, patient then signs out against medical advice witnessed by the nurse.  Attending made aware. 57M with PMHx HTN, bipolar d/o, hypogammaglobulinemia, DM, asthma, chronic hyponatremia presenting with SOB x1 day and LLE pain. Admitted with asthma exacerbation, LLE cellulitis and hyponatremia      Problem/Plan - 1:  ·  Problem: Hyponatremia.   ·  Plan: Na 118 up from 116 two weeks ago. Patient notes excess fluid intake as cause he was told by PMD. However he notes 2L fluids per day. Not clear if SSRI could be causing this. Not on thiazide  Has LE edema of unclear etiology but normal recent echo normal  -nephrology followed during admission  - Sodium increased to 131, started on D5 per nephrology recs and plan was to moniotor sodium.   -fluid restriction 1L for now    Problem/Plan - 2:  ·  Problem: Acute asthma exacerbation.   ·  Plan: Wheezing noted on exam. Not tachypneic, no accessory muscle use  -duonebs, prednisone ordered  -ICS.    Problem/Plan - 3:  ·  Problem: Cellulitis.   ·  Plan: Nonpurulent cellulitis. Has lateral leg ulcer without drainage (chronic for 40 years per patient?)  - Received IV Cefazolin, will give script for Keflex as leaving against medical advice  -wound care was ordered but patient left prior to eval being completed.   -duplex neg for DVT    LE edema  Albumin normal. Check UA  echo ordered. Last normal one year ago.    Problem/Plan - 4:  ·  Problem: Hypogammaglobulinemia.   ·  Plan: States follows with Dr. Boxer for injections monthly for this.    Problem/Plan - 5:  ·  Problem: Type 2 diabetes mellitus with hyperglycemia, without long-term current use of insulin.   ·  Plan: Not on meds. Check A1C. A1C 6.4 one year ago.    Problem/Plan - 6:  ·  Problem: HTN (hypertension).   ·  Plan: Does not know all meds. Per surescripts diltr 300mg and irbesartan 300mg which he confirms - continue these.    Problem/Plan - 7:  ·  Problem: Medication management.   ·  Plan: Does not know all meds  [facility] emailed.    Problem/Plan - 8:  ·  Problem: Bipolar disorder.   ·  Plan: Hold SSRI for now  On monthly haldol injections.      Called by nurse to evaluate patient who wishes to leave the hospital against medical advice. Patient is A&O x3 and has full capacity to make his or her own medical decisions. Pt was informed of their medical conditions, benefits, and alternatives to treatment as well as the risks of refusing treatment and the seriousness of leaving against medical advice such as the risks of heart attack, stroke, seizure, and even death were fully explained to the patient.  After expressing full understanding, patient then signs out against medical advice witnessed by the nurse.  Attending made aware.

## 2023-02-02 NOTE — ED PROVIDER NOTE - NS ED ROS FT
GENERAL: No fever or chills  EYES: no change in vision   HEENT: no trouble swallowing or speaking   CARDIAC: no chest pain   PULMONARY: no cough, + SOB  GI:  No abdominal pain  : No changes in urination   SKIN: Redness and swelling of LLE  NEURO: no headache   MSK: No joint pain     All other ROS negative unless otherwise specified in HPI.

## 2023-02-02 NOTE — DISCHARGE NOTE NURSING/CASE MANAGEMENT/SOCIAL WORK - NSDCPEFALRISK_GEN_ALL_CORE
For information on Fall & Injury Prevention, visit: [email or web address] OR  [email or web address] OR  https://www.cdc.gov/steadi/patient.html

## 2023-02-02 NOTE — PROGRESS NOTE ADULT - PROBLEM SELECTOR PLAN 2
- LLE erythema and edema on exam  - duplex negative for DVT  - c/w empiric antibiotics (on Ancef), monitor for response to therapy  - f/u wound care

## 2023-02-02 NOTE — CONSULT NOTE ADULT - PROBLEM SELECTOR RECOMMENDATION 9
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 Upon review of [place], pt had multiple episodes of hyponatremia. SNa was 129 on 10/4/22. SNa was 116 during ER visit on 1/18/23, and is low at 118 today. Serum osm is at 260, and urine osm is 267. Recommend fluid restriction to <1L/day. Obtain repeat urine osm, and urine sodium. Monitor SNa. Avoid overcorrection. Goal for SNa increase is 6-8 mEq/L within the next 24 hours.    If you have any questions, please feel free to contact me  Solo Bello  Nephrology Fellow  [phone] / [employer] Teams(Preferred)  (After 5pm or on weekends please page the on-call fellow) Pt. with acute on chronic hyponatremia. Upon review of [place], pt. with hyponatremia in the past.  SNa was 129 on 10/4/22. SNa was 116 during ER visit on 1/18/23, and is low at 118 today. Serum osm is at 260, and urine osm is 267. Recommend fluid restriction to <1L/day. Obtain repeat urine osm, and urine sodium. Monitor SNa. Avoid overcorrection. Goal for SNa increase is 6-8 mEq/L within the next 24 hours.    If you have any questions, please feel free to contact me  Solo Bello  Nephrology Fellow  [phone] / [employer] Teams(Preferred)  (After 5pm or on weekends please page the on-call fellow) Pt. with acute on chronic hyponatremia in setting of Paroxetine use. Pt. reports increased fluid/free water intake. Upon review of [place], pt. with hyponatremia in the past.  SNa was 129 on 10/4/22. SNa was 116 during ER visit on 1/18/23, and is low at 118 today. Serum osm is at 260, and urine osm is 267. Recommend fluid restriction to <1L/day. Obtain repeat urine osm, and urine sodium. Monitor SNa. Avoid overcorrection. Goal for SNa increase is 6-8 mEq/L within the next 24 hours.    If you have any questions, please feel free to contact me  Solo Bello  Nephrology Fellow  [phone] / [employer] Teams(Preferred)  (After 5pm or on weekends please page the on-call fellow) Pt. with acute on chronic hyponatremia in setting of Paroxetine use. Pt. reports increased fluid/free water intake. Upon review of [place], pt. with hyponatremia in the past. SNa was 129 on 10/4/22. SNa was 116 during ER visit on 1/18/23, and is low at 118 today. Pt is euvolemic on exam. Serum osm is 260, and urine osm is 267. Recommend fluid restriction to <1L/day. Obtain repeat urine osm, and urine sodium. Monitor SNa. Avoid overcorrection. Goal for SNa increase is 6-8 mEq/L within the next 24 hours.    If you have any questions, please feel free to contact me  Solo Bello  Nephrology Fellow  [phone] / [employer] Teams(Preferred)  (After 5pm or on weekends please page the on-call fellow)

## 2023-02-02 NOTE — ED ADULT NURSE REASSESSMENT NOTE - NS ED NURSE REASSESS COMMENT FT1
Pt. returned from ultrasound. Resting comfortably, offers no complaints. Repeat blood work drawn and sent. Medicated as per orders. VSS.

## 2023-02-02 NOTE — PROGRESS NOTE ADULT - PROBLEM SELECTOR PLAN 3
- was hypoxic to 89% in ED, however currently appears to be comfortable on RA, no wheeze appreciated on my exam  - c/w symbicort, c/w nasra prn  - current smoker, would  smoking cessation, offer nicotine replacement therapy if amenable (is resistant to quitting)

## 2023-02-02 NOTE — PROGRESS NOTE ADULT - ASSESSMENT
56 yo M w/ HTN, DM2, hypogammaglobulinemia, asthma, smoker, bipolar disorder, chronic hyponatremia p/w SOB and LLE pain, with episode of hypoxia to 89% on RA, started on antibiotics for LLE cellulitis, also with acute on chronic hyponatremia.

## 2023-02-02 NOTE — ED PROVIDER NOTE - ATTENDING CONTRIBUTION TO CARE
57-year-old male with a past medical history of hypertension hypergammaglobulinemia diabetes and bipolar presenting with shortness of breath.  Started 9 hours prior to arrival.  It is worse with exertion.  Over the last week he is also noticed increasing lower extremity swelling and redness worse in the left leg.  There is no chest pain or abdominal pain.    Vitals: I have reviewed the patients vital signs  General: nontoxic appearing  HEENT: Atraumatic, normocephalic, airway patent  Eyes: EOMI, tracking appropriately  Neck: no tracheal deviation  Chest/Lungs: no trauma, symmetric chest rise, speaking in complete sentences,  no resp distress  Heart: skin and extremities well perfused, regular rate and rhythm  Neuro: A+Ox3, appears non focal  MSK: no deformities  Skin: no cyanosis, no jaundice -there is erythema of the left lower extremity with the presence of an ulcer with no purulent drainage.  It is warm to the touch.  There is also symmetric 2+ bilateral pitting edema in both legs.  Psych:  Normal mood and affect    57-year-old male with a past medical history of diabetes bipolar hypertension presenting with shortness of breath.  The differential includes but is not limited to pulmonary embolism, acute decompensated heart failure, pulmonary edema, pleural effusion.  The patient does have lower extremity swelling but it is more symmetric in nature.  He is not tachycardic nor tachypneic nor hypoxic his pain is also not present so pulmonary was considered less likely.  Patient will get chest x-ray to evaluate for pulmonary edema.  ACS and CHF are both evaluated for and or not present based on laboratory studies.  With bilateral lower extremity swelling mostly to consider DVTs as ultrasound will be obtained.  Patient found to be hyponatremic will be admitted for this and IV antibiotics for cellulitis.

## 2023-02-04 ENCOUNTER — INPATIENT (INPATIENT)
Facility: HOSPITAL | Age: 58
LOS: 3 days | Discharge: HOME HEALTH | End: 2023-02-08
Attending: STUDENT IN AN ORGANIZED HEALTH CARE EDUCATION/TRAINING PROGRAM | Admitting: STUDENT IN AN ORGANIZED HEALTH CARE EDUCATION/TRAINING PROGRAM
Payer: MEDICARE

## 2023-02-04 VITALS
HEART RATE: 78 BPM | OXYGEN SATURATION: 97 % | DIASTOLIC BLOOD PRESSURE: 89 MMHG | SYSTOLIC BLOOD PRESSURE: 161 MMHG | TEMPERATURE: 98 F | RESPIRATION RATE: 20 BRPM

## 2023-02-04 DIAGNOSIS — K08.199 COMPLETE LOSS OF TEETH DUE TO OTHER SPECIFIED CAUSE, UNSPECIFIED CLASS: Chronic | ICD-10-CM

## 2023-02-04 DIAGNOSIS — J34.2 DEVIATED NASAL SEPTUM: Chronic | ICD-10-CM

## 2023-02-04 DIAGNOSIS — L03.90 CELLULITIS, UNSPECIFIED: ICD-10-CM

## 2023-02-04 LAB
ALBUMIN SERPL ELPH-MCNC: 3.7 G/DL — SIGNIFICANT CHANGE UP (ref 3.3–5)
ALP SERPL-CCNC: 149 U/L — HIGH (ref 40–120)
ALT FLD-CCNC: 39 U/L — SIGNIFICANT CHANGE UP (ref 4–41)
ANION GAP SERPL CALC-SCNC: 7 MMOL/L — SIGNIFICANT CHANGE UP (ref 7–14)
AST SERPL-CCNC: 22 U/L — SIGNIFICANT CHANGE UP (ref 4–40)
BASOPHILS # BLD AUTO: 0.05 K/UL — SIGNIFICANT CHANGE UP (ref 0–0.2)
BASOPHILS NFR BLD AUTO: 0.7 % — SIGNIFICANT CHANGE UP (ref 0–2)
BILIRUB SERPL-MCNC: 0.2 MG/DL — SIGNIFICANT CHANGE UP (ref 0.2–1.2)
BUN SERPL-MCNC: 8 MG/DL — SIGNIFICANT CHANGE UP (ref 7–23)
CALCIUM SERPL-MCNC: 9.7 MG/DL — SIGNIFICANT CHANGE UP (ref 8.4–10.5)
CHLORIDE SERPL-SCNC: 93 MMOL/L — LOW (ref 98–107)
CO2 SERPL-SCNC: 33 MMOL/L — HIGH (ref 22–31)
CREAT SERPL-MCNC: 0.59 MG/DL — SIGNIFICANT CHANGE UP (ref 0.5–1.3)
EGFR: 115 ML/MIN/1.73M2 — SIGNIFICANT CHANGE UP
EOSINOPHIL # BLD AUTO: 0.25 K/UL — SIGNIFICANT CHANGE UP (ref 0–0.5)
EOSINOPHIL NFR BLD AUTO: 3.5 % — SIGNIFICANT CHANGE UP (ref 0–6)
GLUCOSE SERPL-MCNC: 98 MG/DL — SIGNIFICANT CHANGE UP (ref 70–99)
HCT VFR BLD CALC: 40.3 % — SIGNIFICANT CHANGE UP (ref 39–50)
HGB BLD-MCNC: 13.1 G/DL — SIGNIFICANT CHANGE UP (ref 13–17)
IANC: 4.54 K/UL — SIGNIFICANT CHANGE UP (ref 1.8–7.4)
IMM GRANULOCYTES NFR BLD AUTO: 1.8 % — HIGH (ref 0–0.9)
LYMPHOCYTES # BLD AUTO: 1.61 K/UL — SIGNIFICANT CHANGE UP (ref 1–3.3)
LYMPHOCYTES # BLD AUTO: 22.4 % — SIGNIFICANT CHANGE UP (ref 13–44)
MAGNESIUM SERPL-MCNC: 1.9 MG/DL — SIGNIFICANT CHANGE UP (ref 1.6–2.6)
MCHC RBC-ENTMCNC: 25.7 PG — LOW (ref 27–34)
MCHC RBC-ENTMCNC: 32.5 GM/DL — SIGNIFICANT CHANGE UP (ref 32–36)
MCV RBC AUTO: 79 FL — LOW (ref 80–100)
MONOCYTES # BLD AUTO: 0.62 K/UL — SIGNIFICANT CHANGE UP (ref 0–0.9)
MONOCYTES NFR BLD AUTO: 8.6 % — SIGNIFICANT CHANGE UP (ref 2–14)
NEUTROPHILS # BLD AUTO: 4.54 K/UL — SIGNIFICANT CHANGE UP (ref 1.8–7.4)
NEUTROPHILS NFR BLD AUTO: 63 % — SIGNIFICANT CHANGE UP (ref 43–77)
NRBC # BLD: 0 /100 WBCS — SIGNIFICANT CHANGE UP (ref 0–0)
NRBC # FLD: 0 K/UL — SIGNIFICANT CHANGE UP (ref 0–0)
PLATELET # BLD AUTO: 264 K/UL — SIGNIFICANT CHANGE UP (ref 150–400)
POTASSIUM SERPL-MCNC: 4.4 MMOL/L — SIGNIFICANT CHANGE UP (ref 3.5–5.3)
POTASSIUM SERPL-SCNC: 4.4 MMOL/L — SIGNIFICANT CHANGE UP (ref 3.5–5.3)
PROT SERPL-MCNC: 6.2 G/DL — SIGNIFICANT CHANGE UP (ref 6–8.3)
RBC # BLD: 5.1 M/UL — SIGNIFICANT CHANGE UP (ref 4.2–5.8)
RBC # FLD: 15.2 % — HIGH (ref 10.3–14.5)
SARS-COV-2 RNA SPEC QL NAA+PROBE: SIGNIFICANT CHANGE UP
SODIUM SERPL-SCNC: 133 MMOL/L — LOW (ref 135–145)
WBC # BLD: 7.2 K/UL — SIGNIFICANT CHANGE UP (ref 3.8–10.5)
WBC # FLD AUTO: 7.2 K/UL — SIGNIFICANT CHANGE UP (ref 3.8–10.5)

## 2023-02-04 PROCEDURE — 99223 1ST HOSP IP/OBS HIGH 75: CPT

## 2023-02-04 PROCEDURE — 99285 EMERGENCY DEPT VISIT HI MDM: CPT

## 2023-02-04 RX ORDER — ENOXAPARIN SODIUM 100 MG/ML
40 INJECTION SUBCUTANEOUS EVERY 12 HOURS
Refills: 0 | Status: DISCONTINUED | OUTPATIENT
Start: 2023-02-04 | End: 2023-02-08

## 2023-02-04 RX ORDER — ATORVASTATIN CALCIUM 80 MG/1
40 TABLET, FILM COATED ORAL AT BEDTIME
Refills: 0 | Status: DISCONTINUED | OUTPATIENT
Start: 2023-02-04 | End: 2023-02-08

## 2023-02-04 RX ORDER — SODIUM CHLORIDE 9 MG/ML
1000 INJECTION, SOLUTION INTRAVENOUS ONCE
Refills: 0 | Status: DISCONTINUED | OUTPATIENT
Start: 2023-02-04 | End: 2023-02-04

## 2023-02-04 RX ORDER — ONDANSETRON 8 MG/1
4 TABLET, FILM COATED ORAL EVERY 8 HOURS
Refills: 0 | Status: DISCONTINUED | OUTPATIENT
Start: 2023-02-04 | End: 2023-02-08

## 2023-02-04 RX ORDER — FLUTICASONE FUROATE, UMECLIDINIUM BROMIDE AND VILANTEROL TRIFENATATE 200; 62.5; 25 UG/1; UG/1; UG/1
1 POWDER RESPIRATORY (INHALATION)
Qty: 0 | Refills: 0 | DISCHARGE

## 2023-02-04 RX ORDER — CEFAZOLIN SODIUM 1 G
2000 VIAL (EA) INJECTION EVERY 8 HOURS
Refills: 0 | Status: DISCONTINUED | OUTPATIENT
Start: 2023-02-04 | End: 2023-02-08

## 2023-02-04 RX ORDER — DILTIAZEM HCL 120 MG
300 CAPSULE, EXT RELEASE 24 HR ORAL DAILY
Refills: 0 | Status: DISCONTINUED | OUTPATIENT
Start: 2023-02-04 | End: 2023-02-08

## 2023-02-04 RX ORDER — CEFAZOLIN SODIUM 1 G
1000 VIAL (EA) INJECTION ONCE
Refills: 0 | Status: COMPLETED | OUTPATIENT
Start: 2023-02-04 | End: 2023-02-04

## 2023-02-04 RX ORDER — LOSARTAN POTASSIUM 100 MG/1
100 TABLET, FILM COATED ORAL ONCE
Refills: 0 | Status: COMPLETED | OUTPATIENT
Start: 2023-02-04 | End: 2023-02-04

## 2023-02-04 RX ORDER — NICOTINE POLACRILEX 2 MG
1 GUM BUCCAL DAILY
Refills: 0 | Status: DISCONTINUED | OUTPATIENT
Start: 2023-02-04 | End: 2023-02-08

## 2023-02-04 RX ORDER — ACETAMINOPHEN 500 MG
650 TABLET ORAL EVERY 6 HOURS
Refills: 0 | Status: DISCONTINUED | OUTPATIENT
Start: 2023-02-04 | End: 2023-02-08

## 2023-02-04 RX ORDER — TIOTROPIUM BROMIDE AND OLODATEROL 3.124; 2.736 UG/1; UG/1
2 SPRAY, METERED RESPIRATORY (INHALATION) DAILY
Refills: 0 | Status: DISCONTINUED | OUTPATIENT
Start: 2023-02-04 | End: 2023-02-08

## 2023-02-04 RX ORDER — LANOLIN ALCOHOL/MO/W.PET/CERES
3 CREAM (GRAM) TOPICAL AT BEDTIME
Refills: 0 | Status: DISCONTINUED | OUTPATIENT
Start: 2023-02-04 | End: 2023-02-08

## 2023-02-04 RX ORDER — CHOLECALCIFEROL (VITAMIN D3) 125 MCG
2000 CAPSULE ORAL DAILY
Refills: 0 | Status: DISCONTINUED | OUTPATIENT
Start: 2023-02-04 | End: 2023-02-08

## 2023-02-04 RX ORDER — HYDROXYZINE HCL 10 MG
50 TABLET ORAL AT BEDTIME
Refills: 0 | Status: DISCONTINUED | OUTPATIENT
Start: 2023-02-04 | End: 2023-02-08

## 2023-02-04 RX ADMIN — LOSARTAN POTASSIUM 100 MILLIGRAM(S): 100 TABLET, FILM COATED ORAL at 20:49

## 2023-02-04 RX ADMIN — Medication 100 MILLIGRAM(S): at 18:15

## 2023-02-04 NOTE — ED PROVIDER NOTE - ATTENDING CONTRIBUTION TO CARE
I have personally seen and examined this patient.  I have fully participated in the care of this patient. I performed a substantive portion of the visit including all aspects of the medical decision making. I have reviewed all pertinent clinical information, including history, physical exam, plan and the Resident’s note and agree except as noted. - MD Lai.    58 yo M, with chronic left foot ulcer, bipolar, here for incompleted abx treatment, pt is now agreeable for admission, readmission for cellulitis treatment.

## 2023-02-04 NOTE — H&P ADULT - PROBLEM SELECTOR PLAN 5
- Verified patient's medication with [facility] pharmacy, will c/w diltiazem as per med rec from the pharmacy, med hx pharmacist emailed to help verify patient's medications (pt states he is on a "sartan" medication, ?irbesartan?) - Will order nicotine patch

## 2023-02-04 NOTE — H&P ADULT - ASSESSMENT
This is a 57M with history as above who presents to the hospital for worsening L leg swelling with findings concerning for L leg cellulitis.

## 2023-02-04 NOTE — H&P ADULT - PROBLEM SELECTOR PLAN 9
- Med Hx pharmacist emailed to help verify patient's medications - c/w inh therapy with therapeutic interchange

## 2023-02-04 NOTE — H&P ADULT - PROBLEM SELECTOR PLAN 8
- c/w inh therapy with therapeutic interchange - Patient reports being in IVIG inj but unclear on last dose  - Consider allergy eval in AM to assess for IVIG dosing while in patient

## 2023-02-04 NOTE — H&P ADULT - PROBLEM SELECTOR PLAN 12
- Documented history of DM2 but patient denies and [facility] pharmacy med rec without diabetic medications -> check A1C in AM

## 2023-02-04 NOTE — H&P ADULT - PROBLEM SELECTOR PLAN 1
- Was placed on ancef, then left AMA and placed on cephalexin, unclear if compliant with cephalexin  - Here with persistent L leg swelling  - Suspect likely cellulitis associated with L leg wound, s/p ancef in ED, will c/w ancef  - Prior xrays neg for OM  - Monitor redness/swelling  - Would not culture the open wound as it will likely be multimicrobial, no fevers/SIRS vitals here so would hold off on BCx  - Concern that patient might not be able to reliably take medications at home, consider social work eval in AM to assess for his living situation

## 2023-02-04 NOTE — H&P ADULT - PROBLEM SELECTOR PLAN 4
- Persistent swelling of the b/l LE, extensive work up on last hospitalization with negative DVT study, negative proBNP, negative trops, nl TTE  - Urine studies did show protein loss, possibly has edema 2/2 proteinuria  - Would monitor for now, likely outpatient follow up with nephrology for further evaluation of his proteinuria  - Recheck urine protein to creatinine ratio

## 2023-02-04 NOTE — H&P ADULT - PROBLEM SELECTOR PLAN 10
DVT ppx - Lovenox  Diet - Patient edentulous, will place on soft and bite sized DASH/CC diet  Activity - OOB to chair/with assistance    Fall and aspiration precautions - Med Hx pharmacist emailed to help verify patient's medications

## 2023-02-04 NOTE — ED ADULT TRIAGE NOTE - CHIEF COMPLAINT QUOTE
pt here because he has open wound that infected on left lower leg   pt was here thursday and signed out

## 2023-02-04 NOTE — H&P ADULT - PROBLEM SELECTOR PLAN 6
- On haldol inj monthly, pt states his next dose is due on 2/15  - c/w hydroxyzine (confirmed with [facility] pharmacy), holding paroxetine for his hyponatremia - Verified patient's medication with [facility] pharmacy, will c/w diltiazem as per med rec from the pharmacy, med hx pharmacist emailed to help verify patient's medications (pt states he is on a "sartan" medication, ?irbesartan?)

## 2023-02-04 NOTE — ED PROVIDER NOTE - OBJECTIVE STATEMENT
57-year-old male, history of  hypogammaglobulinemia with regular monthly infusions, asthma, bipolar disorder, and recent admission for hyponatremia and lower extremity wound,  3 presenting after leaving AMA yesterday for his left lower extremity wound infection.  He was admitted Wednesday night, with rash  to a chronic wound at an old biopsy site.  He was treated with cefazolin inpatient, he was found to have apple mitten hyponatremia, nephrology following.  He left AMA because he could not smoke in the hospital.  He became more concerned about his wound infection after getting home.  Prompting his arrival here.  Allergy to penicillin and doxycycline.  On home psychiatric medications as well as Diltiazem and statin drug.

## 2023-02-04 NOTE — ED PROVIDER NOTE - PHYSICAL EXAMINATION
Gen: NAD, non-toxic appearing  Head: normal appearing  HEENT: normal conjunctiva  Lung: no respiratory distress, speaking in full sentences  Clear to auscultation bilaterally     CV: regular rate and rhythm, no murmurs  Abd: soft, non distended, non tender   MSK: no visible deformities, Intact from a neurovascular point of view   Over the lower extremities  Neuro: alert and grossly oriented, no gross motor deficits  Skin:  there is a 2 to 3 cm ulcerative wound over the lateral aspect of the left leg, a little lower than midway down the shin.  This is warm compared to the  right leg.  there is no overt purulence or area of fluctuance.  No other wounds were noted.  He has some generalized edema over the lower extremities, 1+.

## 2023-02-04 NOTE — ED PROVIDER NOTE - PROGRESS NOTE DETAILS
Randall Gamez MD (PGY-2): serology unremarkable aside from elevated bicarb similar to prior. spoke w/ pt's sisters, health care proxy. pt lives alone, is not reliable in terms of taking his abx. admission for left leg cellulitis in a pt who is not a safe discharge candidate for outpatient abx therapy.

## 2023-02-04 NOTE — H&P ADULT - NSICDXPASTMEDICALHX_GEN_ALL_CORE_FT
PAST MEDICAL HISTORY:  Abscess of finger     Bipolar disorder     Chronic hyponatremia     DM (diabetes mellitus)     HTN (hypertension)     Hypogammaglobulinemia     Smoker

## 2023-02-04 NOTE — H&P ADULT - NSHPLABSRESULTS_GEN_ALL_CORE
LABS and ADDITIONAL STUDIES:                        13.1   7.20  )-----------( 264      ( 04 Feb 2023 18:15 )             40.3     02-04    133<L>  |  93<L>  |  8   ----------------------------<  98  4.4   |  33<H>  |  0.59    Ca    9.7      04 Feb 2023 18:15  Mg     1.90     02-04    TPro  6.2  /  Alb  3.7  /  TBili  0.2  /  DBili  x   /  AST  22  /  ALT  39  /  AlkPhos  149<H>  02-04    LIVER FUNCTIONS - ( 04 Feb 2023 18:15 )  Alb: 3.7 g/dL / Pro: 6.2 g/dL / ALK PHOS: 149 U/L / ALT: 39 U/L / AST: 22 U/L / GGT: x           < from: Xray Tibia + Fibula 2 Views, Left (02.02.23 @ 02:38) >  IMPRESSION:  No radiographic evidence of osteomyelitis.  No fracture or dislocation.  --- End of Report ---  < end of copied text >    < from: Xray Chest 1 View- PORTABLE-Urgent (02.02.23 @ 02:38) >  IMPRESSION:  No localized pulmonary disease.  --- End of Report ---

## 2023-02-04 NOTE — H&P ADULT - NSHPREVIEWOFSYSTEMS_GEN_ALL_CORE
REVIEW OF SYSTEMS:    CONSTITUTIONAL: No weakness, fevers or chills  EYES: No visual changes or eye discharge  ENT: No rhinorrhea or sore throat  NECK: No pain or stiffness  RESPIRATORY: No cough, wheezing, hemoptysis; No shortness of breath  CARDIOVASCULAR: No chest pain or palpitations; No lower extremity edema  GASTROINTESTINAL: No abdominal or epigastric pain. No nausea, vomiting, or hematemesis; No diarrhea or constipation. No melena or hematochezia.  BACK: No back pain  GENITOURINARY: No dysuria, frequency or hematuria  NEUROLOGICAL: No numbness or weakness  SKIN: +L lateral leg wound/ulcer with occasional drainage (clear), No itching, burning, rashes

## 2023-02-04 NOTE — H&P ADULT - PROBLEM SELECTOR PLAN 11
- Documented history of DM2 but patient denies and [facility] pharmacy med rec without diabetic medications -> check A1C in AM DVT ppx - Lovenox  Diet - Patient edentulous, will place on soft and bite sized DASH/CC diet  Activity - OOB to chair/with assistance    Fall and aspiration precautions

## 2023-02-04 NOTE — H&P ADULT - NSHPPHYSICALEXAM_GEN_ALL_CORE
Vital Signs Last 24 Hrs  T(C): 36.6 (04 Feb 2023 21:09), Max: 36.8 (04 Feb 2023 18:05)  T(F): 97.8 (04 Feb 2023 21:09), Max: 98.3 (04 Feb 2023 18:05)  HR: 79 (04 Feb 2023 21:09) (78 - 85)  BP: 161/76 (04 Feb 2023 21:09) (155/83 - 174/106)  BP(mean): --  RR: 16 (04 Feb 2023 21:09) (16 - 20)  SpO2: 96% (04 Feb 2023 21:09) (95% - 97%)    Parameters below as of 04 Feb 2023 21:09  Patient On (Oxygen Delivery Method): room air    GENERAL: No acute distress, well-developed  EYES: EOMI, PERRL, conjunctiva and sclera clear  ENT: Neck supple, No JVD, moist mucosa  CHEST/LUNG: Clear to auscultation bilaterally; No wheeze, equal breath sounds bilaterally   BACK: No spinal tenderness, no CVA TTP  HEART: Regular rate and rhythm; No murmurs, rubs, or gallops  ABDOMEN: Soft, Nontender, Nondistended; Bowel sounds present  EXTREMITIES: +DP/PT/Radial pulses, 2+ pitting edema b/l L worse than R  PSYCH: Nl behavior, nl affect  NEUROLOGY: AAOx3, non-focal  SKIN: +2cm x 2cm L lateral leg ulceration with surrounding erythema/hyperpigmentation, no significant warmth/TTP noted, no drainage, +hyperkeratosis of b/l feet, otherwise no rashes noted, nl color

## 2023-02-04 NOTE — H&P ADULT - PROBLEM SELECTOR PLAN 3
- Chronic hyponatremia, was hyponatremic to 118 on initial presentation on 2/2, then overcorrected to 131 on repeat, was placed on D5 but patient left AMA soon after, now hyponatremic to 133  - Spoke with renal fellow overnight, no acute interventions needed at present  - Will check urine sodium, urine osm, add on serum osm  - Hold IVF for now  - Monitor Na level

## 2023-02-04 NOTE — ED PROVIDER NOTE - NS ED ROS FT
GENERAL: no fever  EYES: no eye pain  HEENT: no neck pain  CARDIAC: no chest pain  PULMONARY: no SOB  GI: no abdominal pain  : no dysuria  SKIN:   Lower extremity wound infection.  NEURO: no headache  MSK: no new joint pain

## 2023-02-04 NOTE — ED ADULT NURSE NOTE - SKIN INTEGRITY
Stage 3 like wound noted to lateral aspect of LT shin area, approx. [id number]. No active bleeding or drainage noted.

## 2023-02-04 NOTE — ED ADULT NURSE NOTE - OBJECTIVE STATEMENT
Pt presented to ed with c/o open wound to lateral aspect of LT shin area. pt was here on Thursday and signed out AMA. pt denies nausea, vomiting, fever, chills, sob, chest pain, headache, dizziness.

## 2023-02-04 NOTE — H&P ADULT - PROBLEM SELECTOR PLAN 7
- Patient reports being in IVIG inj but unclear on last dose  - Consider allergy eval in AM to assess for IVIG dosing while in patient - On haldol inj monthly, pt states his next dose is due on 2/15  - c/w hydroxyzine (confirmed with [facility] pharmacy), holding paroxetine for his hyponatremia

## 2023-02-05 DIAGNOSIS — Z79.899 OTHER LONG TERM (CURRENT) DRUG THERAPY: ICD-10-CM

## 2023-02-05 DIAGNOSIS — M79.89 OTHER SPECIFIED SOFT TISSUE DISORDERS: ICD-10-CM

## 2023-02-05 DIAGNOSIS — L03.116 CELLULITIS OF LEFT LOWER LIMB: ICD-10-CM

## 2023-02-05 DIAGNOSIS — S81.802A UNSPECIFIED OPEN WOUND, LEFT LOWER LEG, INITIAL ENCOUNTER: ICD-10-CM

## 2023-02-05 DIAGNOSIS — Z29.9 ENCOUNTER FOR PROPHYLACTIC MEASURES, UNSPECIFIED: ICD-10-CM

## 2023-02-05 DIAGNOSIS — F31.9 BIPOLAR DISORDER, UNSPECIFIED: ICD-10-CM

## 2023-02-05 DIAGNOSIS — J45.909 UNSPECIFIED ASTHMA, UNCOMPLICATED: ICD-10-CM

## 2023-02-05 DIAGNOSIS — F17.200 NICOTINE DEPENDENCE, UNSPECIFIED, UNCOMPLICATED: ICD-10-CM

## 2023-02-05 DIAGNOSIS — E87.1 HYPO-OSMOLALITY AND HYPONATREMIA: ICD-10-CM

## 2023-02-05 DIAGNOSIS — I10 ESSENTIAL (PRIMARY) HYPERTENSION: ICD-10-CM

## 2023-02-05 DIAGNOSIS — D80.1 NONFAMILIAL HYPOGAMMAGLOBULINEMIA: ICD-10-CM

## 2023-02-05 LAB
A1C WITH ESTIMATED AVERAGE GLUCOSE RESULT: 6.3 % — HIGH (ref 4–5.6)
ANION GAP SERPL CALC-SCNC: 9 MMOL/L — SIGNIFICANT CHANGE UP (ref 7–14)
APPEARANCE UR: CLEAR — SIGNIFICANT CHANGE UP
BILIRUB UR-MCNC: NEGATIVE — SIGNIFICANT CHANGE UP
BUN SERPL-MCNC: 12 MG/DL — SIGNIFICANT CHANGE UP (ref 7–23)
CALCIUM SERPL-MCNC: 9.3 MG/DL — SIGNIFICANT CHANGE UP (ref 8.4–10.5)
CHLORIDE SERPL-SCNC: 90 MMOL/L — LOW (ref 98–107)
CO2 SERPL-SCNC: 32 MMOL/L — HIGH (ref 22–31)
COLOR SPEC: SIGNIFICANT CHANGE UP
CREAT ?TM UR-MCNC: 61 MG/DL — SIGNIFICANT CHANGE UP
CREAT SERPL-MCNC: 0.68 MG/DL — SIGNIFICANT CHANGE UP (ref 0.5–1.3)
DIFF PNL FLD: NEGATIVE — SIGNIFICANT CHANGE UP
EGFR: 110 ML/MIN/1.73M2 — SIGNIFICANT CHANGE UP
ESTIMATED AVERAGE GLUCOSE: 134 — SIGNIFICANT CHANGE UP
GLUCOSE BLDC GLUCOMTR-MCNC: 191 MG/DL — HIGH (ref 70–99)
GLUCOSE BLDC GLUCOMTR-MCNC: 83 MG/DL — SIGNIFICANT CHANGE UP (ref 70–99)
GLUCOSE SERPL-MCNC: 192 MG/DL — HIGH (ref 70–99)
GLUCOSE UR QL: NEGATIVE — SIGNIFICANT CHANGE UP
HCT VFR BLD CALC: 43.2 % — SIGNIFICANT CHANGE UP (ref 39–50)
HGB BLD-MCNC: 13.9 G/DL — SIGNIFICANT CHANGE UP (ref 13–17)
KETONES UR-MCNC: NEGATIVE — SIGNIFICANT CHANGE UP
LEUKOCYTE ESTERASE UR-ACNC: NEGATIVE — SIGNIFICANT CHANGE UP
MAGNESIUM SERPL-MCNC: 1.9 MG/DL — SIGNIFICANT CHANGE UP (ref 1.6–2.6)
MCHC RBC-ENTMCNC: 25.6 PG — LOW (ref 27–34)
MCHC RBC-ENTMCNC: 32.2 GM/DL — SIGNIFICANT CHANGE UP (ref 32–36)
MCV RBC AUTO: 79.7 FL — LOW (ref 80–100)
NITRITE UR-MCNC: NEGATIVE — SIGNIFICANT CHANGE UP
NRBC # BLD: 0 /100 WBCS — SIGNIFICANT CHANGE UP (ref 0–0)
NRBC # FLD: 0 K/UL — SIGNIFICANT CHANGE UP (ref 0–0)
OSMOLALITY UR: 451 MOSM/KG — SIGNIFICANT CHANGE UP (ref 50–1200)
PH UR: 7 — SIGNIFICANT CHANGE UP (ref 5–8)
PHOSPHATE SERPL-MCNC: 4.1 MG/DL — SIGNIFICANT CHANGE UP (ref 2.5–4.5)
PLATELET # BLD AUTO: 262 K/UL — SIGNIFICANT CHANGE UP (ref 150–400)
POTASSIUM SERPL-MCNC: 4.4 MMOL/L — SIGNIFICANT CHANGE UP (ref 3.5–5.3)
POTASSIUM SERPL-SCNC: 4.4 MMOL/L — SIGNIFICANT CHANGE UP (ref 3.5–5.3)
PROT ?TM UR-MCNC: 44 MG/DL — SIGNIFICANT CHANGE UP
PROT UR-MCNC: ABNORMAL
RBC # BLD: 5.42 M/UL — SIGNIFICANT CHANGE UP (ref 4.2–5.8)
RBC # FLD: 15.4 % — HIGH (ref 10.3–14.5)
SODIUM SERPL-SCNC: 131 MMOL/L — LOW (ref 135–145)
SODIUM UR-SCNC: 120 MMOL/L — SIGNIFICANT CHANGE UP
SP GR SPEC: 1.01 — SIGNIFICANT CHANGE UP (ref 1.01–1.05)
UROBILINOGEN FLD QL: SIGNIFICANT CHANGE UP
WBC # BLD: 6.27 K/UL — SIGNIFICANT CHANGE UP (ref 3.8–10.5)
WBC # FLD AUTO: 6.27 K/UL — SIGNIFICANT CHANGE UP (ref 3.8–10.5)

## 2023-02-05 PROCEDURE — 99233 SBSQ HOSP IP/OBS HIGH 50: CPT

## 2023-02-05 RX ADMIN — ATORVASTATIN CALCIUM 40 MILLIGRAM(S): 80 TABLET, FILM COATED ORAL at 23:15

## 2023-02-05 RX ADMIN — ENOXAPARIN SODIUM 40 MILLIGRAM(S): 100 INJECTION SUBCUTANEOUS at 17:28

## 2023-02-05 RX ADMIN — ENOXAPARIN SODIUM 40 MILLIGRAM(S): 100 INJECTION SUBCUTANEOUS at 06:00

## 2023-02-05 RX ADMIN — Medication 1 PATCH: at 16:03

## 2023-02-05 RX ADMIN — Medication 100 MILLIGRAM(S): at 13:51

## 2023-02-05 RX ADMIN — Medication 100 MILLIGRAM(S): at 23:16

## 2023-02-05 RX ADMIN — Medication 1 PATCH: at 18:12

## 2023-02-05 RX ADMIN — Medication 100 MILLIGRAM(S): at 06:00

## 2023-02-05 RX ADMIN — TIOTROPIUM BROMIDE AND OLODATEROL 2 PUFF(S): 3.124; 2.736 SPRAY, METERED RESPIRATORY (INHALATION) at 10:00

## 2023-02-05 RX ADMIN — Medication 2000 UNIT(S): at 13:29

## 2023-02-05 RX ADMIN — Medication 300 MILLIGRAM(S): at 06:19

## 2023-02-05 NOTE — PATIENT PROFILE ADULT - FALL HARM RISK - RISK INTERVENTIONS

## 2023-02-05 NOTE — PHARMACOTHERAPY INTERVENTION NOTE - COMMENTS
Medication history saved as incomplete. Outpatient medication record (OMR) updated based on medication list from outpatient pharmacy. Please see OMR for medication specific notations and fill dates. ACP provider notified.

## 2023-02-05 NOTE — PATIENT PROFILE ADULT - PRO INTERPRETER NEED 2
English No Residual Tumor Seen Histology Text: There were no malignant cells seen in the sections examined.

## 2023-02-06 ENCOUNTER — TRANSCRIPTION ENCOUNTER (OUTPATIENT)
Age: 58
End: 2023-02-06

## 2023-02-06 LAB
ANION GAP SERPL CALC-SCNC: 9 MMOL/L — SIGNIFICANT CHANGE UP (ref 7–14)
BUN SERPL-MCNC: 9 MG/DL — SIGNIFICANT CHANGE UP (ref 7–23)
CALCIUM SERPL-MCNC: 9.6 MG/DL — SIGNIFICANT CHANGE UP (ref 8.4–10.5)
CHLORIDE SERPL-SCNC: 93 MMOL/L — LOW (ref 98–107)
CO2 SERPL-SCNC: 31 MMOL/L — SIGNIFICANT CHANGE UP (ref 22–31)
CREAT SERPL-MCNC: 0.59 MG/DL — SIGNIFICANT CHANGE UP (ref 0.5–1.3)
EGFR: 115 ML/MIN/1.73M2 — SIGNIFICANT CHANGE UP
GLUCOSE BLDC GLUCOMTR-MCNC: 128 MG/DL — HIGH (ref 70–99)
GLUCOSE BLDC GLUCOMTR-MCNC: 158 MG/DL — HIGH (ref 70–99)
GLUCOSE BLDC GLUCOMTR-MCNC: 96 MG/DL — SIGNIFICANT CHANGE UP (ref 70–99)
GLUCOSE BLDC GLUCOMTR-MCNC: 96 MG/DL — SIGNIFICANT CHANGE UP (ref 70–99)
GLUCOSE SERPL-MCNC: 121 MG/DL — HIGH (ref 70–99)
HCT VFR BLD CALC: 44.9 % — SIGNIFICANT CHANGE UP (ref 39–50)
HGB BLD-MCNC: 14.4 G/DL — SIGNIFICANT CHANGE UP (ref 13–17)
MAGNESIUM SERPL-MCNC: 1.9 MG/DL — SIGNIFICANT CHANGE UP (ref 1.6–2.6)
MCHC RBC-ENTMCNC: 25.5 PG — LOW (ref 27–34)
MCHC RBC-ENTMCNC: 32.1 GM/DL — SIGNIFICANT CHANGE UP (ref 32–36)
MCV RBC AUTO: 79.6 FL — LOW (ref 80–100)
NRBC # BLD: 0 /100 WBCS — SIGNIFICANT CHANGE UP (ref 0–0)
NRBC # FLD: 0 K/UL — SIGNIFICANT CHANGE UP (ref 0–0)
PHOSPHATE SERPL-MCNC: 4.1 MG/DL — SIGNIFICANT CHANGE UP (ref 2.5–4.5)
PLATELET # BLD AUTO: 258 K/UL — SIGNIFICANT CHANGE UP (ref 150–400)
POTASSIUM SERPL-MCNC: 4.1 MMOL/L — SIGNIFICANT CHANGE UP (ref 3.5–5.3)
POTASSIUM SERPL-SCNC: 4.1 MMOL/L — SIGNIFICANT CHANGE UP (ref 3.5–5.3)
RBC # BLD: 5.64 M/UL — SIGNIFICANT CHANGE UP (ref 4.2–5.8)
RBC # FLD: 15.2 % — HIGH (ref 10.3–14.5)
SODIUM SERPL-SCNC: 133 MMOL/L — LOW (ref 135–145)
WBC # BLD: 6.11 K/UL — SIGNIFICANT CHANGE UP (ref 3.8–10.5)
WBC # FLD AUTO: 6.11 K/UL — SIGNIFICANT CHANGE UP (ref 3.8–10.5)

## 2023-02-06 PROCEDURE — 99223 1ST HOSP IP/OBS HIGH 75: CPT

## 2023-02-06 PROCEDURE — 99232 SBSQ HOSP IP/OBS MODERATE 35: CPT

## 2023-02-06 RX ADMIN — Medication 2000 UNIT(S): at 11:19

## 2023-02-06 RX ADMIN — Medication 100 MILLIGRAM(S): at 05:26

## 2023-02-06 RX ADMIN — ENOXAPARIN SODIUM 40 MILLIGRAM(S): 100 INJECTION SUBCUTANEOUS at 17:23

## 2023-02-06 RX ADMIN — Medication 1 PATCH: at 11:12

## 2023-02-06 RX ADMIN — Medication 100 MILLIGRAM(S): at 14:18

## 2023-02-06 RX ADMIN — Medication 1 PATCH: at 19:43

## 2023-02-06 RX ADMIN — Medication 1 PATCH: at 07:45

## 2023-02-06 RX ADMIN — Medication 300 MILLIGRAM(S): at 06:35

## 2023-02-06 RX ADMIN — ENOXAPARIN SODIUM 40 MILLIGRAM(S): 100 INJECTION SUBCUTANEOUS at 05:26

## 2023-02-06 RX ADMIN — Medication 100 MILLIGRAM(S): at 21:37

## 2023-02-06 RX ADMIN — Medication 1 PATCH: at 11:15

## 2023-02-06 RX ADMIN — ATORVASTATIN CALCIUM 40 MILLIGRAM(S): 80 TABLET, FILM COATED ORAL at 21:37

## 2023-02-06 NOTE — CONSULT NOTE ADULT - ASSESSMENT
57y old  Male with L LE wound with cellulitis open ulcer   with history of HTN, Bipolar d/o (on monthly Haldol inj, next dose due on 2/15 as per patient), Hypogammaglobulinemia (on monthly IVIG inj, unclear on last dose), Asthma, Chronic Hyponatremia and ?DM2 (patient denies, not on medications for DM2) s/p ancef ,he left AMA as he wanted to smoke cigarettes and could not take being in the hospital. ,then came back because the swelling is persistent, has the oral antibiotic he was discharged on (cephalexin 500mg QID).   a chronic L leg ulcer/wound (states its been present for >35 years, reports a prior biopsy that was negative for cancer).   should have repeat biopsy if path not retrievable- can do outpatient  [id number]/kg, protien 1.2-1.5g/kg,  management  offload  moisture barrier  compression 2 layer kerlex, ace- discused with team  DVT prophylaxisis  established/ problem   stable      data reviewed lab, tests, records, image  complexity high   risk high -  due to dx, complexity data, risk  time 75 min 223

## 2023-02-06 NOTE — DISCHARGE NOTE PROVIDER - CARE PROVIDER_API CALL
Saritha Burciaga  INTERNAL MEDICINE  [address], [address]  [place] [address]  Phone: [phone]  Fax: [phone]  Follow Up Time:     Hollie Cook)  Psychiatry  [address] [place] [address]  Phone: [phone]  Fax: [phone]  Follow Up Time:

## 2023-02-06 NOTE — DISCHARGE NOTE PROVIDER - ATTENDING DISCHARGE PHYSICAL EXAMINATION:
CONSTITUTIONAL: NAD, well-developed  RESPIRATORY: Normal respiratory effort; lungs are clear to auscultation bilaterally  CARDIOVASCULAR: Regular rate and rhythm, normal S1 and S2, no murmur/rub/gallop;  ABDOMEN: Nontender to palpation, normoactive bowel sounds, no rebound/guarding;  MUSCLOSKELETAL: no clubbing or cyanosis of digits;   SKIN/EXTS: B/L LE edema, about +2, left leg wrapped C/D/I  PSYCH: calm and pleasant

## 2023-02-06 NOTE — DISCHARGE NOTE PROVIDER - NSCORESITESY/N_GEN_A_CORE_RD
"Subjective     Mckayla Patel is a 60 Enedelia Mendes MD  [facility]         No

## 2023-02-06 NOTE — DISCHARGE NOTE PROVIDER - NSDCCPCAREPLAN_GEN_ALL_CORE_FT
PRINCIPAL DISCHARGE DIAGNOSIS  Diagnosis: Cellulitis  Assessment and Plan of Treatment: Kelfex 500 mg twice daily to complete a 10 day course. Take through 2/12/2023      SECONDARY DISCHARGE DIAGNOSES  Diagnosis: Wound of left leg  Assessment and Plan of Treatment: Wound instructions:  -->Left lateral lower leg wound- Cleanse wound and periwound skin with normal saline. Pat dry. Apply liquid barrier film to periwound skin. Medihoney gel to wound base, silicone foam with border to cover. Apply ACE wrap. Change daily.   -->Apply Attrac-TAIN to bilateral feet, heels daily; avoid between toes.    Diagnosis: Hypogammaglobulinemia  Assessment and Plan of Treatment: Follow up outpatient for IVIG infusions    Diagnosis: Essential hypertension  Assessment and Plan of Treatment: Continue blood pressure medication regimen as directed. Follow a low salt/cholesterol diet. Monitor for any visual changes, headaches or dizziness.  Monitor blood pressure regularly.  Follow up with your primary care provider for further management for high blood pressure.      Diagnosis: Bipolar disorder  Assessment and Plan of Treatment: Continue medications as prescribed. Follow up with your primary care doctor/psychiatrist for further evaluation and management. Please call to make an appointment within 1-2 weeks of discharge.  Please check with outpatient provider about restarting Paxil (paroxetine)       Diagnosis: Asthma  Assessment and Plan of Treatment: Continue your inhalers and annual pulmonary function tests with your outpatient provider. Monitor for asthma exacerbation, such as, shortness of breath, hyperventilation, or any other difficulties breathing and report to the emergency room in the event that your rescue inhaler has not resolved your symptoms.

## 2023-02-06 NOTE — CONSULT NOTE ADULT - SUBJECTIVE AND OBJECTIVE BOX
Patient is a 57y old  Male who presents with a chief complaint of L LE wound with cellulitis (05 Feb 2023 13:48)      HPI:  This is a 57M with history of HTN, Bipolar d/o (on monthly Haldol inj, next dose due on 2/15 as per patient), Hypogammaglobulinemia (on monthly IVIG inj, unclear on last dose), Asthma, Chronic Hyponatremia and ?DM2 (patient denies, not on medications for DM2) who presents to the hospital with complaints of a persistent L leg ulcer. Patient was recently at [facility] on 2/2/23 for worsening L leg swelling and was placed on ancef for likely cellulitis and was admitted. Soon after admission he left AMA as he wanted to smoke cigarettes and could not take being in the hospital. Currently said that he came back because the swelling is persistent, has the oral antibiotic he was discharged on (cephalexin 500mg QID) but unclear if he has been taking it. He reports swelling of the L leg with a chronic L leg ulcer/wound (states its been present for >35 years, reports a prior biopsy that was negative for cancer). Reports occasional clear discharge from his wound but no purulence. No pain/warmth associated with the wound. No fevers/chills/diaphoresis. No other acute complaints. Currently states that he is willing to stay in the hospital for treatment.     On arrival to the ED, his vitals were T 98, P 78, /89, RR 20, O2 sat 97% RA. His lab work was most notable for mild hyponatremia (improved from prior levels). He was given ancef 1g IVPB x1, HCTZ 25mg PO x1, and losartan 100mg x1. He was admitted to medicine for further management.  (04 Feb 2023 22:51)  need 4    REVIEW OF SYSTEMSsee HPI and PMH others neg  Allergies    amoxicillin (Fever)  penicillin (Rash)    Intolerances      General:	  Skin/Breast:  ENMT:	  Respiratory and Thorax:  Cardiovascular:	  Gastrointestinal:	  Genitourinary:	  Musculoskeletal:	  Neurological:	  Endocrine:	    MEDICATIONS  (STANDING):  atorvastatin 40 milliGRAM(s) Oral at bedtime  ceFAZolin   IVPB 2000 milliGRAM(s) IV Intermittent every 8 hours  cholecalciferol 2000 Unit(s) Oral daily  diltiazem    milliGRAM(s) Oral daily  enoxaparin Injectable 40 milliGRAM(s) SubCutaneous every 12 hours  nicotine - 21 mG/24Hr(s) Patch 1 Patch Transdermal daily  tiotropium 2.5 MICROgram(s)/olodaterol 2.5 MICROgram(s) (STIOLTO) Inhaler 2 Puff(s) Inhalation daily    MEDICATIONS  (PRN):  acetaminophen     Tablet .. 650 milliGRAM(s) Oral every 6 hours PRN Temp greater or equal to 38C (100.4F), Mild Pain (1 - 3)  aluminum hydroxide/magnesium hydroxide/simethicone Suspension 30 milliLiter(s) Oral every 4 hours PRN Dyspepsia  hydrOXYzine hydrochloride 50 milliGRAM(s) Oral at bedtime PRN Anxiety  melatonin 3 milliGRAM(s) Oral at bedtime PRN Insomnia  ondansetron Injectable 4 milliGRAM(s) IV Push every 8 hours PRN Nausea and/or Vomiting      FAMILY HISTORY:  Family history of throat cancer (Father)    Family history of leukemia (Mother)        Social history:  smokes 2-3 packs a day  PAST MEDICAL & SURGICAL HISTORY:  Smoker  DM (diabetes mellitus)  HTN (hypertension)  Hypogammaglobulinemia  Abscess of finger  Bipolar disorder  Chronic hyponatremia  Deviated septum  Loss of teeth due to extraction  All teeth due to dental carries          I&O's Summary    05 Feb 2023 07:01  -  06 Feb 2023 07:00  --------------------------------------------------------  IN: 2020 mL / OUT: 0 mL / NET: 2020 mL        Vital Signs Last 24 Hrs  T(C): 36.2 (06 Feb 2023 05:00), Max: 36.7 (05 Feb 2023 18:45)  T(F): 97.2 (06 Feb 2023 05:00), Max: 98.1 (05 Feb 2023 18:45)  HR: 81 (06 Feb 2023 08:30) (65 - 81)  BP: 179/98 (06 Feb 2023 08:30) (147/69 - 190/98)  BP(mean): --  RR: 18 (06 Feb 2023 05:00) (17 - 19)  SpO2: 94% (06 Feb 2023 08:30) (94% - 96%)    Parameters below as of 06 Feb 2023 08:30  Patient On (Oxygen Delivery Method): room air        Height (cm): 188 (02-05 @ 12:30)  Weight (kg): 128.6 (02-05 @ 12:30)  BMI (kg/m2): 36.4 (02-05 @ 12:30)  BSA (m2): 2.52 (02-05 @ 12:30)      PHYSICAL EXAM:   Constitutional:nad  ENMT:non icteric  Back:  Respiratory:  Cardiovascular:  Gastrointestinal:  Extremities: left leg 1.8x1.8x.7 cm medihoney and foam raised edges no fluctures  Skin:  Musculoskeletal: thick callous on heels    < from: US Duplex Venous Lower Ext Complete, Bilateral (02.02.23 @ 03:32) >    Enlarged lymph node of the left groin measuring 1.1 x 1.6 x 2.7 cm   demonstrating normal fatty hilum, likely reactive. Mild subcutaneous   edema of the calf.    IMPRESSION:  No evidence of deepvenous thrombosis in either lower extremity.    < end of copied text >  < from: Xray Tibia + Fibula 2 Views, Left (02.02.23 @ 02:38) >  INDINGS:  No fracture. No dislocation.  No subcutaneous emphysema, cortical erosions, or periosteal reaction to   suggest osteomyelitis.  The visualized joint spaces are intact.    IMPRESSION:  No radiographic evidence of osteomyelitis.  No fracture or dislocation.    --- End of Report ---          ARNULFO PEREZ MD; Resident Radiologist  This document has been electronically signed.  ANA MARÍA DOCKERY MD; Attending Radiologist  This document has been electronically signed. Feb 2 2023  7:01AM    < end of copied text >    CBC Full  -  ( 06 Feb 2023 04:12 )  WBC Count : 6.11 K/uL  RBC Count : 5.64 M/uL  Hemoglobin : 14.4 g/dL  Hematocrit : 44.9 %  Platelet Count - Automated : 258 K/uL  Mean Cell Volume : 79.6 fL  Mean Cell Hemoglobin : 25.5 pg  Mean Cell Hemoglobin Concentration : 32.1 gm/dL         02-06    133<L>  |  93<L>  |  9   ----------------------------<  121<H>  4.1   |  31  |  0.59    Ca    9.6      06 Feb 2023 04:12  Phos  4.1     02-06  Mg     1.90     02-06    TPro  6.2  /  Alb  3.7  /  TBili  0.2  /  DBili  x   /  AST  22  /  ALT  39  /  AlkPhos  149<H>  02-04              Radiology:  nutrition 35cal/kg, protien 1.2-1.5g/kg, DM , Obesity management  offload  moisture barrier  compression 2 layer kerlex, ace  DVT prophylaxisis  established/new problem improved, stable , worsened  additional workup  data reviewed lab, tests, records, image  complexity high mod  risk high -  due to dx, complexity data, risk  time 70 min 223           Patient is a 57y old  Male who presents with a chief complaint of L LE wound with cellulitis with history of HTN, Bipolar d/o (on monthly Haldol inj, next dose due on 2/15 as per patient), Hypogammaglobulinemia (on monthly IVIG inj, unclear on last dose), Asthma, Chronic Hyponatremia and ?DM2 (patient denies, not on medications for DM2) who presents to the hospital with complaints of a persistent L leg ulcer. Patient was recently at [facility] on 2/2/23 for worsening L leg swelling and was placed on ancef for likely cellulitis and was admitted. Soon after admission he left AMA as he wanted to smoke cigarettes and could not take being in the hospital. Currently said that he came back because the swelling is persistent, has the oral antibiotic he was discharged on (cephalexin 500mg QID) but unclear if he has been taking it. He reports swelling of the L leg with a chronic L leg ulcer/wound (states its been present for >35 years, reports a prior biopsy that was negative for cancer). Reports occasional clear discharge from his wound but no purulence. No pain/warmth associated with the wound. No fevers/chills/diaphoresis. No other acute complaints. Currently states that he is willing to stay in the hospital for treatment.     On arrival to the ED, his vitals were T 98, P 78, /89, RR 20, O2 sat 97% RA. His lab work was most notable for mild hyponatremia (improved from prior levels). He was given ancef 1g IVPB x1, HCTZ 25mg PO x1, and losartan 100mg x1. He was admitted to medicine for further management.  (04 Feb 2023 22:51)      REVIEW OF SYSTEMSsee HPI and PMH others neg  Allergies    amoxicillin (Fever)  penicillin (Rash)    Intolerances        MEDICATIONS  (STANDING):  atorvastatin 40 milliGRAM(s) Oral at bedtime  ceFAZolin   IVPB 2000 milliGRAM(s) IV Intermittent every 8 hours  cholecalciferol 2000 Unit(s) Oral daily  diltiazem    milliGRAM(s) Oral daily  enoxaparin Injectable 40 milliGRAM(s) SubCutaneous every 12 hours  nicotine - 21 mG/24Hr(s) Patch 1 Patch Transdermal daily  tiotropium 2.5 MICROgram(s)/olodaterol 2.5 MICROgram(s) (STIOLTO) Inhaler 2 Puff(s) Inhalation daily    MEDICATIONS  (PRN):  acetaminophen     Tablet .. 650 milliGRAM(s) Oral every 6 hours PRN Temp greater or equal to 38C (100.4F), Mild Pain (1 - 3)  aluminum hydroxide/magnesium hydroxide/simethicone Suspension 30 milliLiter(s) Oral every 4 hours PRN Dyspepsia  hydrOXYzine hydrochloride 50 milliGRAM(s) Oral at bedtime PRN Anxiety  melatonin 3 milliGRAM(s) Oral at bedtime PRN Insomnia  ondansetron Injectable 4 milliGRAM(s) IV Push every 8 hours PRN Nausea and/or Vomiting      FAMILY HISTORY:  Family history of throat cancer (Father)    Family history of leukemia (Mother)        Social history: smokes 2-3 packs a day  PAST MEDICAL & SURGICAL HISTORY:  Smoker  DM (diabetes mellitus)  HTN (hypertension)  Hypogammaglobulinemia  Abscess of finger  Bipolar disorder  Chronic hyponatremia  Deviated septum  Loss of teeth due to extraction  All teeth due to dental carries          I&O's Summary    05 Feb 2023 07:01  -  06 Feb 2023 07:00  --------------------------------------------------------  IN: 2020 mL / OUT: 0 mL / NET: 2020 mL        Vital Signs Last 24 Hrs  T(C): 36.2 (06 Feb 2023 05:00), Max: 36.7 (05 Feb 2023 18:45)  T(F): 97.2 (06 Feb 2023 05:00), Max: 98.1 (05 Feb 2023 18:45)  HR: 81 (06 Feb 2023 08:30) (65 - 81)  BP: 179/98 (06 Feb 2023 08:30) (147/69 - 190/98)  BP(mean): --  RR: 18 (06 Feb 2023 05:00) (17 - 19)  SpO2: 94% (06 Feb 2023 08:30) (94% - 96%)    Parameters below as of 06 Feb 2023 08:30  Patient On (Oxygen Delivery Method): room air        Height (cm): 188 (02-05 @ 12:30)  Weight (kg): 128.6 (02-05 @ 12:30)  BMI (kg/m2): 36.4 (02-05 @ 12:30)  BSA (m2): 2.52 (02-05 @ 12:30)      PHYSICAL EXAM:   Constitutional:nad  ENMT:non icteric  Back: nl two larger keratosis mid left back  Respiratory: clear  Cardiovascular:rrr  Gastrointestinal:non tender  gu nl  Extremities: left leg 1.8x1.8x.7 cm medihoney and foam raised hyper pigmented edges no fluctuance  Skin: as noted  Musculoskeletal: thick callous on heels  psych calm    < from: US Duplex Venous Lower Ext Complete, Bilateral (02.02.23 @ 03:32) >    Enlarged lymph node of the left groin measuring 1.1 x 1.6 x 2.7 cm   demonstrating normal fatty hilum, likely reactive. Mild subcutaneous   edema of the calf.    IMPRESSION:  No evidence of deepvenous thrombosis in either lower extremity.    < end of copied text >  < from: Xray Tibia + Fibula 2 Views, Left (02.02.23 @ 02:38) >  INDINGS:  No fracture. No dislocation.  No subcutaneous emphysema, cortical erosions, or periosteal reaction to   suggest osteomyelitis.  The visualized joint spaces are intact.    IMPRESSION:  No radiographic evidence of osteomyelitis.  No fracture or dislocation.    --- End of Report ---          ARNULFO PEREZ MD; Resident Radiologist  This document has been electronically signed.  ANA MARÍA DOCKERY MD; Attending Radiologist  This document has been electronically signed. Feb 2 2023  7:01AM    < end of copied text >    CBC Full  -  ( 06 Feb 2023 04:12 )  WBC Count : 6.11 K/uL  RBC Count : 5.64 M/uL  Hemoglobin : 14.4 g/dL  Hematocrit : 44.9 %  Platelet Count - Automated : 258 K/uL  Mean Cell Volume : 79.6 fL  Mean Cell Hemoglobin : 25.5 pg  Mean Cell Hemoglobin Concentration : 32.1 gm/dL         02-06    133<L>  |  93<L>  |  9   ----------------------------<  121<H>  4.1   |  31  |  0.59    Ca    9.6      06 Feb 2023 04:12  Phos  4.1     02-06  Mg     1.90     02-06    TPro  6.2  /  Alb  3.7  /  TBili  0.2  /  DBili  x   /  AST  22  /  ALT  39  /  AlkPhos  149<H>  02-04              Radiology:< from: Xray Tibia + Fibula 2 Views, Left (02.02.23 @ 02:38) >    FINDINGS:  No fracture. No dislocation.  No subcutaneous emphysema, cortical erosions, or periosteal reaction to   suggest osteomyelitis.  The visualized joint spaces are intact.    IMPRESSION:  No radiographic evidence of osteomyelitis.  No fracture or dislocation.    --- End of Report ---    < end of copied text >

## 2023-02-06 NOTE — DISCHARGE NOTE PROVIDER - NSDCMRMEDTOKEN_GEN_ALL_CORE_FT
Albuterol (Eqv-ProAir HFA) 90 mcg/inh inhalation aerosol: 2 puff(s) inhaled every 6 hours, As Needed    Last filled 3/2022  Anoro Ellipta 62.5 mcg-25 mcg/inh inhalation powder: 1 puff(s) inhaled once a day    Last filled 6/10/22 but prescription was cancelled  cephalexin 500 mg oral tablet: 1 tab(s) orally 4 times a day   dilTIAZem 300 mg/24 hours oral capsule, extended release: 1 cap(s) orally once a day (in the morning)  Haldol Decanoate 100 mg/mL intramuscular solution: 150 milligram(s) intramuscularly every 4 weeks   Per [facility]: administered at clinic on 1/18/23   HYDROXYZINE HCL 25 MG TABLET: take 2 tablets by mouth at bedtime if needed  irbesartan 300 mg oral tablet: 1 tab(s) orally once a day    prescription was cancelled on 1/3/23  mirtazapine 15 mg oral tablet: 0.5 tab(s) orally once a day (at bedtime) (last dispensed in Oct/2022 x 1 month)  PARoxetine 30 mg oral tablet: 2 tab(s) orally once a day  rosuvastatin 10 mg oral tablet: 1 tab(s) orally once a day (Last dispensed 12/2022 x 90 day).   Vitamin D3 50 mcg (2000 intl units) oral tablet: 1 tab(s) orally once a day   Albuterol (Eqv-ProAir HFA) 90 mcg/inh inhalation aerosol: 2 puff(s) inhaled every 6 hours, As Needed    Last filled 3/2022  Anoro Ellipta 62.5 mcg-25 mcg/inh inhalation powder: 1 puff(s) inhaled once a day    Last filled 6/10/22 but prescription was cancelled  cephalexin 500 mg oral tablet: 1 tab(s) orally 4 times a day   dilTIAZem 300 mg/24 hours oral capsule, extended release: 1 cap(s) orally once a day (in the morning)  Haldol Decanoate 100 mg/mL intramuscular solution: 150 milligram(s) intramuscularly every 4 weeks   [facility]: administered at clinic on 1/18/23   HYDROXYZINE HCL 25 MG TABLET: take 2 tablets by mouth at bedtime if needed  melatonin 3 mg oral tablet: 1 tab(s) orally once a day (at bedtime), As needed, Insomnia  rosuvastatin 10 mg oral tablet: 1 tab(s) orally once a day (Last dispensed 12/2022 x 90 day).   Vitamin D3 50 mcg (2000 intl units) oral tablet: 1 tab(s) orally once a day

## 2023-02-06 NOTE — DISCHARGE NOTE PROVIDER - HOSPITAL COURSE
This is a 57M withf pmhx of HTN, Bipolar d/o (on monthly Haldol inj, next dose due on 2/15 as per patient), Hypogammaglobulinemia (on monthly IVIG inj, unclear on last dose), Asthma, Chronic Hyponatremia and ?DM2 (patient denies, not on medications for DM2) who pre presents to the hospital for worsening L leg swelling, found to have open non-purulent left leg wound  with findings concerning for L leg cellulitis.     Left leg cellulitis.   Was placed on ancef, then left AMA and placed on cephalexin, unclear if compliant with cephalexin  Here with persistent L leg swelling. Suspect likely cellulitis associated with L leg wound. recent imaging negative for OM, doppler negative for dvt.    Continue with Ancef, will be discharged on PO Keflex for a total of 10 days antibiotic therapy   Wound care consulted follow up recs  Continue care, some in the doubt ability of patient to self treat with antibiotics at home as stated by patient's sister, also patient demonstrating poor insight, was readmitted quickly after going AMA previously. Given poor medication adherence, would recommend one more day of IV antibiotics and can plan on discharge tomorrow. Can prescribe Keflex for the patient to complete a 10 day course.   Follow up outpatient with wound care.    Wound of left leg.   - Wound care cs recs  Wound instructions:  -->Left lateral lower leg wound- Cleanse wound and periwound skin with NS. Pat dry. Apply liquid barrier film to periwound skin. Medihoney gel to wound base, silicone foam with border to cover. Apply ACE wrap. Change daily.   -->Apply Attrac-TAIN to bilateral feet, heels daily; avoid between toes.  - Alk phos possibly elevated 2/2 wound, would trend for now.    Hyponatremia.   - Chronic hyponatremia, was hyponatremic to 118 on initial presentation on 2/2, then overcorrected to 131 on repeat, was placed on D5 but patient left AMA soon after, now improved.  - Monitor Na level.  - NA = 131 on discharge     Swelling of lower leg.   - Persistent swelling of the b/l LE, extensive work up on last hospitalization with negative DVT study, negative proBNP, negative trops, nl TTE  - Urine studies did show protein loss, possibly has edema 2/2 proteinuria  - Would monitor for now, likely outpatient follow up with nephrology for further evaluation of his proteinuria.    Tobacco dependence.   - Will order nicotine patch.    Essential hypertension.   - Verified patient's medication with [facility], will c/w diltiazem as per med rec from the pharmacy.    Bipolar disorder.   - On haldol inj monthly, pt states his next dose is due on 2/15  - c/w hydroxyzine (confirmed with [facility])  - holding paroxetine for his hyponatremia.    Hypogammaglobulinemia.   - Patient reports being in IVIG inj but unclear on last dose.    Asthma.   - c/w inh therapy with therapeutic interchange.    Medication management.   - Med Hx pharmacist emailed to help verify patient's medications.    Need for prophylactic measure.   DVT ppx - Lovenox  Diet - cont soft and bite sized DASH/CC diet  Activity - OOB to chair/with assistance    Fall and aspiration precautions.    R/O Type 2 diabetes mellitus.   - Documented history of DM2 but patient denies and [facility] pharmacy med rec without diabetic medications  - A1C 6.0.    Dispo: home     On 2/8/2023, case was discussed with Dr. Eddie Matos, patient is medically cleared and optimized for discharge today.   All medications were reviewed with attending, and sent to mutually agreed upon pharmacy.   This is a 57M withf pmhx of HTN, Bipolar d/o (on monthly Haldol inj, next dose due on 2/15 as per patient), Hypogammaglobulinemia (on monthly IVIG inj, unclear on last dose), Asthma, Chronic Hyponatremia and ?DM2 (patient denies, not on medications for DM2) who pre presents to the hospital for worsening L leg swelling, found to have open non-purulent left leg wound  with findings concerning for L leg cellulitis.     Left leg cellulitis.   Was placed on ancef, then left AMA and placed on cephalexin, unclear if compliant with cephalexin  Here with persistent L leg swelling. Suspect likely cellulitis associated with L leg wound. recent imaging negative for OM, doppler negative for dvt.    Continue with Ancef, will be discharged on PO Keflex for a total of 10 days antibiotic therapy   Wound care consulted follow up recs  Continue care, some in the doubt ability of patient to self treat with antibiotics at home as stated by patient's sister, also patient demonstrating poor insight, was readmitted quickly after going AMA previously. Given poor medication adherence, would recommended one more day of IV antibiotics. Keflex prescribed for the patient to complete a 10 day course.   Follow up outpatient with wound care.    Wound of left leg.   - Wound care cs recs  Wound instructions:  -->Left lateral lower leg wound- Cleanse wound and periwound skin with NS. Pat dry. Apply liquid barrier film to periwound skin. Medihoney gel to wound base, silicone foam with border to cover. Apply ACE wrap. Change daily.   -->Apply Attrac-TAIN to bilateral feet, heels daily; avoid between toes.  - Alk phos possibly elevated 2/2 wound, would trend for now.    Hyponatremia.   - Chronic hyponatremia, was hyponatremic to 118 on initial presentation on 2/2, then overcorrected to 131 on repeat, was placed on D5 but patient left AMA soon after, now improved.  - Monitor Na level.  - NA = 131 on discharge     Swelling of lower leg.   - Persistent swelling of the b/l LE, extensive work up on last hospitalization with negative DVT study, negative proBNP, negative trops, nl TTE  - Urine studies did show protein loss, possibly has edema 2/2 proteinuria  - Would monitor for now, likely outpatient follow up with nephrology for further evaluation of his proteinuria.    Tobacco dependence.   - Will order nicotine patch.    Essential hypertension.   - Verified patient's medication with [facility], will c/w diltiazem as per med rec from the pharmacy.    Bipolar disorder.   - On haldol inj monthly, pt states his next dose is due on 2/15  - c/w hydroxyzine (confirmed with [facility])  - holding paroxetine for his hyponatremia.    Hypogammaglobulinemia.   - Patient reports being in IVIG inj but unclear on last dose.    Asthma.   - c/w inh therapy with therapeutic interchange.    Medication management.   - Med Hx pharmacist emailed to help verify patient's medications.    Need for prophylactic measure.   DVT ppx - Lovenox  Diet - cont soft and bite sized DASH/CC diet  Activity - OOB to chair/with assistance    Fall and aspiration precautions.    R/O Type 2 diabetes mellitus.   - Documented history of DM2 but patient denies and [facility] med rec without diabetic medications  - A1C 6.0.    Dispo: home     On 2/8/2023, case was discussed with Dr. Eddie Matos, patient is medically cleared and optimized for discharge today.   All medications were reviewed with attending, and sent to mutually agreed upon pharmacy.

## 2023-02-06 NOTE — DISCHARGE NOTE PROVIDER - NSDCFUSCHEDAPPT_GEN_ALL_CORE_FT
[facility]  [address]  Scheduled Appointment: 02/09/2023    [facility]  [address]  Scheduled Appointment: 03/09/2023    [facility]  [address]  Scheduled Appointment: 04/06/2023    [facility]  [address]  Scheduled Appointment: 05/04/2023

## 2023-02-07 LAB
ANION GAP SERPL CALC-SCNC: 9 MMOL/L — SIGNIFICANT CHANGE UP (ref 7–14)
BUN SERPL-MCNC: 8 MG/DL — SIGNIFICANT CHANGE UP (ref 7–23)
CALCIUM SERPL-MCNC: 9.8 MG/DL — SIGNIFICANT CHANGE UP (ref 8.4–10.5)
CHLORIDE SERPL-SCNC: 93 MMOL/L — LOW (ref 98–107)
CO2 SERPL-SCNC: 29 MMOL/L — SIGNIFICANT CHANGE UP (ref 22–31)
CREAT SERPL-MCNC: 0.6 MG/DL — SIGNIFICANT CHANGE UP (ref 0.5–1.3)
EGFR: 114 ML/MIN/1.73M2 — SIGNIFICANT CHANGE UP
GLUCOSE BLDC GLUCOMTR-MCNC: 103 MG/DL — HIGH (ref 70–99)
GLUCOSE BLDC GLUCOMTR-MCNC: 106 MG/DL — HIGH (ref 70–99)
GLUCOSE BLDC GLUCOMTR-MCNC: 109 MG/DL — HIGH (ref 70–99)
GLUCOSE BLDC GLUCOMTR-MCNC: 119 MG/DL — HIGH (ref 70–99)
GLUCOSE SERPL-MCNC: 112 MG/DL — HIGH (ref 70–99)
HCT VFR BLD CALC: 46.2 % — SIGNIFICANT CHANGE UP (ref 39–50)
HGB BLD-MCNC: 15 G/DL — SIGNIFICANT CHANGE UP (ref 13–17)
MAGNESIUM SERPL-MCNC: 1.9 MG/DL — SIGNIFICANT CHANGE UP (ref 1.6–2.6)
MCHC RBC-ENTMCNC: 25.9 PG — LOW (ref 27–34)
MCHC RBC-ENTMCNC: 32.5 GM/DL — SIGNIFICANT CHANGE UP (ref 32–36)
MCV RBC AUTO: 79.8 FL — LOW (ref 80–100)
NRBC # BLD: 0 /100 WBCS — SIGNIFICANT CHANGE UP (ref 0–0)
NRBC # FLD: 0 K/UL — SIGNIFICANT CHANGE UP (ref 0–0)
PHOSPHATE SERPL-MCNC: 4 MG/DL — SIGNIFICANT CHANGE UP (ref 2.5–4.5)
PLATELET # BLD AUTO: 252 K/UL — SIGNIFICANT CHANGE UP (ref 150–400)
POTASSIUM SERPL-MCNC: 4.2 MMOL/L — SIGNIFICANT CHANGE UP (ref 3.5–5.3)
POTASSIUM SERPL-SCNC: 4.2 MMOL/L — SIGNIFICANT CHANGE UP (ref 3.5–5.3)
RBC # BLD: 5.79 M/UL — SIGNIFICANT CHANGE UP (ref 4.2–5.8)
RBC # FLD: 15.3 % — HIGH (ref 10.3–14.5)
SODIUM SERPL-SCNC: 131 MMOL/L — LOW (ref 135–145)
WBC # BLD: 8.31 K/UL — SIGNIFICANT CHANGE UP (ref 3.8–10.5)
WBC # FLD AUTO: 8.31 K/UL — SIGNIFICANT CHANGE UP (ref 3.8–10.5)

## 2023-02-07 PROCEDURE — 99233 SBSQ HOSP IP/OBS HIGH 50: CPT

## 2023-02-07 RX ADMIN — Medication 2000 UNIT(S): at 11:12

## 2023-02-07 RX ADMIN — ENOXAPARIN SODIUM 40 MILLIGRAM(S): 100 INJECTION SUBCUTANEOUS at 16:54

## 2023-02-07 RX ADMIN — Medication 100 MILLIGRAM(S): at 13:44

## 2023-02-07 RX ADMIN — Medication 3 MILLIGRAM(S): at 21:26

## 2023-02-07 RX ADMIN — Medication 300 MILLIGRAM(S): at 05:04

## 2023-02-07 RX ADMIN — Medication 1 PATCH: at 11:14

## 2023-02-07 RX ADMIN — Medication 1 PATCH: at 07:05

## 2023-02-07 RX ADMIN — Medication 100 MILLIGRAM(S): at 05:05

## 2023-02-07 RX ADMIN — Medication 1 PATCH: at 19:38

## 2023-02-07 RX ADMIN — Medication 1 PATCH: at 11:12

## 2023-02-07 RX ADMIN — Medication 100 MILLIGRAM(S): at 21:27

## 2023-02-07 RX ADMIN — ENOXAPARIN SODIUM 40 MILLIGRAM(S): 100 INJECTION SUBCUTANEOUS at 05:04

## 2023-02-07 RX ADMIN — ATORVASTATIN CALCIUM 40 MILLIGRAM(S): 80 TABLET, FILM COATED ORAL at 21:26

## 2023-02-07 NOTE — PROGRESS NOTE ADULT - ASSESSMENT
This is a 57M withf pmhx of HTN, Bipolar d/o (on monthly Haldol inj, next dose due on 2/15 as per patient), Hypogammaglobulinemia (on monthly IVIG inj, unclear on last dose), Asthma, Chronic Hyponatremia and ?DM2 (patient denies, not on medications for DM2) who pre presents to the hospital for worsening L leg swelling, found to have open non-purulent left leg wound  with findings concerning for L leg cellulitis. 

## 2023-02-07 NOTE — PROGRESS NOTE ADULT - PROBLEM SELECTOR PLAN 3
- Chronic hyponatremia, was hyponatremic to 118 on initial presentation on 2/2, then overcorrected to 131 on repeat, was placed on D5 but patient left AMA soon after, now hyponatremic to 133.  - Monitor Na level
- Chronic hyponatremia, was hyponatremic to 118 on initial presentation on 2/2, then overcorrected to 131 on repeat, was placed on D5 but patient left AMA soon after, now hyponatremic to 133.  - Monitor Na level
- Chronic hyponatremia, was hyponatremic to 118 on initial presentation on 2/2, then overcorrected to 131 on repeat, was placed on D5 but patient left AMA soon after, now improved.  - Monitor Na level

## 2023-02-07 NOTE — PROGRESS NOTE ADULT - PROBLEM SELECTOR PLAN 2
- Wound care cs recs  - Alk phos possibly elevated 2/2 wound, would trend for now

## 2023-02-07 NOTE — PROGRESS NOTE ADULT - PROBLEM SELECTOR PLAN 11
DVT ppx - Lovenox  Diet - cont soft and bite sized DASH/CC diet  Activity - OOB to chair/with assistance    Fall and aspiration precautions

## 2023-02-07 NOTE — PROGRESS NOTE ADULT - PROBLEM SELECTOR PLAN 1
Was placed on ancef, then left AMA and placed on cephalexin, unclear if compliant with cephalexin  Here with persistent L leg swelling. Suspect likely cellulitis associated with L leg wound. recent imaging negative for OM, doppler negative for dvt.  s/p ancef in ED.  - cont with ncef   - Monitor redness/swelling  - Would not culture the open wound as it will likely be multimicrobial, no fevers/SIRS vitals here so would hold off on BCx  - wound care consult  - Concern that patient might not be able to reliably take medications at home  - fu ELIAN coronado to assess for his living situation
Was placed on ancef, then left AMA and placed on cephalexin, unclear if compliant with cephalexin  Here with persistent L leg swelling. Suspect likely cellulitis associated with L leg wound. recent imaging negative for OM, doppler negative for dvt.    Continue with Ancef  Wound care consulted follow up recs  Continue care, doubt ability of patient to self treat with antibiotics at home, also patient demonstrating poor insight, was readmitted quickly after going AMA previously. Will need further evaluation, will try to continue to persuade patient to stay as inpatient.
Was placed on ancef, then left AMA and placed on cephalexin, unclear if compliant with cephalexin  Here with persistent L leg swelling. Suspect likely cellulitis associated with L leg wound. recent imaging negative for OM, doppler negative for dvt.    Continue with Ancef  Wound care consulted follow up recs  Continue care, some in the doubt ability of patient to self treat with antibiotics at home as stated by patient's sister, also patient demonstrating poor insight, was readmitted quickly after going AMA previously. Given poor medication adherence, would recommend one more day of IV antibiotics and can plan on discharge tomorrow. Can prescribe Keflex for the patient to complete a 10 day course.   Follow up outpatient with wound care.

## 2023-02-07 NOTE — PROGRESS NOTE ADULT - PROBLEM SELECTOR PLAN 9
- c/w inh therapy with therapeutic interchange

## 2023-02-07 NOTE — PROGRESS NOTE ADULT - PROBLEM SELECTOR PLAN 4
- Persistent swelling of the b/l LE, extensive work up on last hospitalization with negative DVT study, negative proBNP, negative trops, nl TTE  - Urine studies did show protein loss, possibly has edema 2/2 proteinuria  - Would monitor for now, likely outpatient follow up with nephrology for further evaluation of his proteinuria

## 2023-02-07 NOTE — PROGRESS NOTE ADULT - PROBLEM SELECTOR PLAN 8
- Patient reports being in IVIG inj but unclear on last dose

## 2023-02-07 NOTE — PROGRESS NOTE ADULT - PROBLEM SELECTOR PLAN 7
- On haldol inj monthly, pt states his next dose is due on 2/15  - c/w hydroxyzine (confirmed with [facility] pharmacy)  - holding paroxetine for his hyponatremia
- On haldol inj monthly, pt states his next dose is due on 2/15  - c/w hydroxyzine (confirmed with [facility] pharmacy)  - holding paroxetine for his hyponatremia
- On haldol inj monthly, pt states his next dose is due on 2/15  - c/w hydroxyzine (confirmed with [facility] pharmacy)  - holding paroxetine for his hyponatremia

## 2023-02-07 NOTE — PROGRESS NOTE ADULT - SUBJECTIVE AND OBJECTIVE BOX
Eddie Matos MD  Academic Hospitalist  Pager [phone]  Email: [email or web address]  Available on [employer] Teams        PROGRESS NOTE:     Patient is a 57y old  Male who presents with a chief complaint of L LE wound with cellulitis (06 Feb 2023 15:35)      SUBJECTIVE / OVERNIGHT EVENTS:  Patient seen and examined this morning. Patient very eager to leave, however agreed to stay for one more day of intravenous antibiotics after being offered food and ice cream.   ADDITIONAL REVIEW OF SYSTEMS:  No f/c/n/v      Plan of care discussed with the patient's sister, Brittany. All questions and concerns were addressed.     MEDICATIONS  (STANDING):  atorvastatin 40 milliGRAM(s) Oral at bedtime  ceFAZolin   IVPB 2000 milliGRAM(s) IV Intermittent every 8 hours  cholecalciferol 2000 Unit(s) Oral daily  diltiazem    milliGRAM(s) Oral daily  enoxaparin Injectable 40 milliGRAM(s) SubCutaneous every 12 hours  nicotine - 21 mG/24Hr(s) Patch 1 Patch Transdermal daily  tiotropium 2.5 MICROgram(s)/olodaterol 2.5 MICROgram(s) (STIOLTO) Inhaler 2 Puff(s) Inhalation daily    MEDICATIONS  (PRN):  acetaminophen     Tablet .. 650 milliGRAM(s) Oral every 6 hours PRN Temp greater or equal to 38C (100.4F), Mild Pain (1 - 3)  aluminum hydroxide/magnesium hydroxide/simethicone Suspension 30 milliLiter(s) Oral every 4 hours PRN Dyspepsia  hydrOXYzine hydrochloride 50 milliGRAM(s) Oral at bedtime PRN Anxiety  melatonin 3 milliGRAM(s) Oral at bedtime PRN Insomnia  ondansetron Injectable 4 milliGRAM(s) IV Push every 8 hours PRN Nausea and/or Vomiting      CAPILLARY BLOOD GLUCOSE      POCT Blood Glucose.: 119 mg/dL (07 Feb 2023 11:20)  POCT Blood Glucose.: 106 mg/dL (07 Feb 2023 07:27)  POCT Blood Glucose.: 128 mg/dL (06 Feb 2023 22:30)  POCT Blood Glucose.: 158 mg/dL (06 Feb 2023 16:46)    I&O's Summary    06 Feb 2023 07:01  -  07 Feb 2023 07:00  --------------------------------------------------------  IN: 474 mL / OUT: 0 mL / NET: 474 mL        PHYSICAL EXAM:  Vital Signs Last 24 Hrs  T(C): 36.4 (07 Feb 2023 11:26), Max: 36.4 (07 Feb 2023 11:26)  T(F): 97.6 (07 Feb 2023 11:26), Max: 97.6 (07 Feb 2023 11:26)  HR: 86 (07 Feb 2023 11:26) (72 - 86)  BP: 166/90 (07 Feb 2023 11:26) (161/78 - 179/103)  BP(mean): --  RR: 18 (07 Feb 2023 11:26) (17 - 18)  SpO2: 98% (07 Feb 2023 11:26) (95% - 98%)    Parameters below as of 07 Feb 2023 11:26  Patient On (Oxygen Delivery Method): room air        CONSTITUTIONAL: NAD, well-developed  RESPIRATORY: Normal respiratory effort; lungs are clear to auscultation bilaterally  CARDIOVASCULAR: Regular rate and rhythm, normal S1 and S2, no murmur/rub/gallop;  ABDOMEN: Nontender to palpation, normoactive bowel sounds, no rebound/guarding;  MUSCLOSKELETAL: no clubbing or cyanosis of digits;   SKIN/EXTS: B/L LE edema, about +2, left leg wrapped C/D/I  PSYCH: calm and pleasant    LABS:                        15.0   8.31  )-----------( 252      ( 07 Feb 2023 05:00 )             46.2     02-07    131<L>  |  93<L>  |  8   ----------------------------<  112<H>  4.2   |  29  |  0.60    Ca    9.8      07 Feb 2023 05:00  Phos  4.0     02-07  Mg     1.90     02-07                  RADIOLOGY & ADDITIONAL TESTS:  Results Reviewed:   Imaging Personally Reviewed:  Electrocardiogram Personally Reviewed:    COORDINATION OF CARE:  Care Discussed with Consultants/Other Providers [Y/N]: Discussed with CM, further home care to be established.   Prior or Outpatient Records Reviewed [Y/N]:  
57M with presents with chief compliant of worsening L leg swelling with findings concerning for L leg cellulitis.     Subjective: NAEO. Continues to have left leg swelling, and open wound. denies purulent drainage. Denies any fever/chills. is able to ambulate to the bathroom.     MEDICATIONS  (STANDING):  atorvastatin 40 milliGRAM(s) Oral at bedtime  ceFAZolin   IVPB 2000 milliGRAM(s) IV Intermittent every 8 hours  cholecalciferol 2000 Unit(s) Oral daily  diltiazem    milliGRAM(s) Oral daily  enoxaparin Injectable 40 milliGRAM(s) SubCutaneous every 12 hours  nicotine - 21 mG/24Hr(s) Patch 1 Patch Transdermal daily  tiotropium 2.5 MICROgram(s)/olodaterol 2.5 MICROgram(s) (STIOLTO) Inhaler 2 Puff(s) Inhalation daily    MEDICATIONS  (PRN):  acetaminophen     Tablet .. 650 milliGRAM(s) Oral every 6 hours PRN Temp greater or equal to 38C (100.4F), Mild Pain (1 - 3)  aluminum hydroxide/magnesium hydroxide/simethicone Suspension 30 milliLiter(s) Oral every 4 hours PRN Dyspepsia  hydrOXYzine hydrochloride 50 milliGRAM(s) Oral at bedtime PRN Anxiety  melatonin 3 milliGRAM(s) Oral at bedtime PRN Insomnia  ondansetron Injectable 4 milliGRAM(s) IV Push every 8 hours PRN Nausea and/or Vomiting    VITAL SIGNS:  T(C): 36.4 (23 @ 12:30), Max: 36.8 (23 @ 18:05)  T(F): 97.5 (23 @ 12:30), Max: 98.3 (23 @ 18:05)  HR: 81 (23 @ 12:30) (72 - 85)  BP: 169/92 (23 @ 12:30) (145/81 - 174/106)  BP(mean): --  RR: 18 (23 @ 12:30) (16 - 20)  SpO2: 95% (23 @ 12:30) (95% - 97%)  Wt(kg): --    PHYSICAL EXAM:  Constitutional: obese, NAD  Head: NC/AT  Eyes: PERRL, anicteric sclera  Respiratory: CTA B/L; no W/R/R  Cardiac: +S1/S2; RRR; no M/R/G  Gastrointestinal: soft, NT/ND; no rebound or guarding; +BSx4  Extremities: 2+ pitting edema bilaterally, left leg with non-purulent open ulcer with surrounding erythema  Vascular: 2+ radial, DP/PT pulses B/L  Neurologic: AAOx3; CNII-XII grossly intact; no focal deficits      LABS:                        13.9   6.27  )-----------( 262      ( 2023 09:57 )             43.2     02-05    131<L>  |  90<L>  |  12  ----------------------------<  192<H>  4.4   |  32<H>  |  0.68    Ca    9.3      2023 09:57  Phos  4.1     02-05  Mg     1.90     02-05    TPro  6.2  /  Alb  3.7  /  TBili  0.2  /  DBili  x   /  AST  22  /  ALT  39  /  AlkPhos  149<H>  02-04      Urinalysis Basic - ( 2023 09:18 )    Color: Light Yellow / Appearance: Clear / S.013 / pH: x  Gluc: x / Ketone: Negative  / Bili: Negative / Urobili: <2 mg/dL   Blood: x / Protein: 30 mg/dL / Nitrite: Negative   Leuk Esterase: Negative / RBC: 1 /HPF / WBC 0 /HPF   Sq Epi: x / Non Sq Epi: 0 /HPF / Bacteria: Negative      CAPILLARY BLOOD GLUCOSE      POCT Blood Glucose.: 83 mg/dL (2023 12:06)      RADIOLOGY & ADDITIONAL TESTS: Reviewed.    
Eddie Matos MD  Academic Hospitalist  Pager [phone]  Email: [email or web address]  Available on [employer] Teams        PROGRESS NOTE:     Patient is a 57y old  Male who presents with a chief complaint of L LE wound with cellulitis (2023 13:32)      SUBJECTIVE / OVERNIGHT EVENTS:  Patient seen and examined this morning. Discussed ongoing treatment plan. He was asking to get more meals. Patient agreeable to continue his current treatment, however was later notified that he is inquiring again about DC/AMA.  ADDITIONAL REVIEW OF SYSTEMS:  He denies f/c/n/v    MEDICATIONS  (STANDING):  atorvastatin 40 milliGRAM(s) Oral at bedtime  ceFAZolin   IVPB 2000 milliGRAM(s) IV Intermittent every 8 hours  cholecalciferol 2000 Unit(s) Oral daily  diltiazem    milliGRAM(s) Oral daily  enoxaparin Injectable 40 milliGRAM(s) SubCutaneous every 12 hours  nicotine - 21 mG/24Hr(s) Patch 1 Patch Transdermal daily  tiotropium 2.5 MICROgram(s)/olodaterol 2.5 MICROgram(s) (STIOLTO) Inhaler 2 Puff(s) Inhalation daily    MEDICATIONS  (PRN):  acetaminophen     Tablet .. 650 milliGRAM(s) Oral every 6 hours PRN Temp greater or equal to 38C (100.4F), Mild Pain (1 - 3)  aluminum hydroxide/magnesium hydroxide/simethicone Suspension 30 milliLiter(s) Oral every 4 hours PRN Dyspepsia  hydrOXYzine hydrochloride 50 milliGRAM(s) Oral at bedtime PRN Anxiety  melatonin 3 milliGRAM(s) Oral at bedtime PRN Insomnia  ondansetron Injectable 4 milliGRAM(s) IV Push every 8 hours PRN Nausea and/or Vomiting      CAPILLARY BLOOD GLUCOSE      POCT Blood Glucose.: 96 mg/dL (2023 11:36)  POCT Blood Glucose.: 96 mg/dL (2023 07:41)  POCT Blood Glucose.: 191 mg/dL (2023 17:23)    I&O's Summary    2023 07:01  -  2023 07:00  --------------------------------------------------------  IN:  mL / OUT: 0 mL / NET:  mL        PHYSICAL EXAM:  Vital Signs Last 24 Hrs  T(C): 36.4 (2023 11:58), Max: 36.7 (2023 18:45)  T(F): 97.6 (2023 11:58), Max: 98.1 (2023 18:45)  HR: 79 (2023 11:58) (65 - 81)  BP: 151/98 (2023 11:58) (147/69 - 190/98)  BP(mean): --  RR: 19 (2023 11:58) (17 - 19)  SpO2: 96% (2023 11:58) (94% - 96%)    Parameters below as of 2023 11:58  Patient On (Oxygen Delivery Method): room air        CONSTITUTIONAL: NAD, well-developed  RESPIRATORY: Normal respiratory effort; lungs are clear to auscultation bilaterally  CARDIOVASCULAR: Regular rate and rhythm, normal S1 and S2, no murmur/rub/gallop;  ABDOMEN: Nontender to palpation, normoactive bowel sounds, no rebound/guarding;  MUSCLOSKELETAL: no clubbing or cyanosis of digits;   SKIN/EXTS: B/L LE edema, about +2, left leg wrapped C/D/I  PSYCH: calm and pleasant    LABS:                        14.4   6.11  )-----------( 258      ( 2023 04:12 )             44.9     02-06    133<L>  |  93<L>  |  9   ----------------------------<  121<H>  4.1   |  31  |  0.59    Ca    9.6      2023 04:12  Phos  4.1     02-06  Mg     1.90     02-06    TPro  6.2  /  Alb  3.7  /  TBili  0.2  /  DBili  x   /  AST  22  /  ALT  39  /  AlkPhos  149<H>  02-04          Urinalysis Basic - ( 2023 09:18 )    Color: Light Yellow / Appearance: Clear / S.013 / pH: x  Gluc: x / Ketone: Negative  / Bili: Negative / Urobili: <2 mg/dL   Blood: x / Protein: 30 mg/dL / Nitrite: Negative   Leuk Esterase: Negative / RBC: 1 /HPF / WBC 0 /HPF   Sq Epi: x / Non Sq Epi: 0 /HPF / Bacteria: Negative          RADIOLOGY & ADDITIONAL TESTS:  Results Reviewed:   Imaging Personally Reviewed:  Electrocardiogram Personally Reviewed:    COORDINATION OF CARE:  Care Discussed with Consultants/Other Providers [Y/N]:  Prior or Outpatient Records Reviewed [Y/N]:

## 2023-02-07 NOTE — PROGRESS NOTE ADULT - PROBLEM SELECTOR PLAN 6
- Verified patient's medication with [facility] pharmacy, will c/w diltiazem as per med rec from the pharmacy
- Verified patient's medication with [facility] pharmacy, will c/w diltiazem as per med rec from the pharmacy
- Verified patient's medication with [facility] pharmacy, will c/w diltiazem as per med rec from the pharmacy

## 2023-02-07 NOTE — PROGRESS NOTE ADULT - PROBLEM SELECTOR PLAN 12
- Documented history of DM2 but patient denies and [facility] pharmacy med rec without diabetic medications  - A1C 6.0

## 2023-02-07 NOTE — PROGRESS NOTE ADULT - PROBLEM SELECTOR PLAN 10
- Med Hx pharmacist emailed to help verify patient's medications

## 2023-02-07 NOTE — PROGRESS NOTE ADULT - TIME BILLING
Extensively discussed with patient the need for treatment, patient initially refused to stay and wanted to leave AMA, however after prolonged conversation and offering him ice cream and food, patient agreed.  Length of face to face was about 25 mins.  Conversation with patient sister 15 mins  Total time reviewing chart notes/labs, recommendation of wound care and charting about 24 mins.

## 2023-02-08 ENCOUNTER — TRANSCRIPTION ENCOUNTER (OUTPATIENT)
Age: 58
End: 2023-02-08

## 2023-02-08 VITALS
TEMPERATURE: 98 F | RESPIRATION RATE: 17 BRPM | DIASTOLIC BLOOD PRESSURE: 79 MMHG | SYSTOLIC BLOOD PRESSURE: 152 MMHG | HEART RATE: 66 BPM | OXYGEN SATURATION: 96 %

## 2023-02-08 LAB
ANION GAP SERPL CALC-SCNC: 11 MMOL/L — SIGNIFICANT CHANGE UP (ref 7–14)
BUN SERPL-MCNC: 10 MG/DL — SIGNIFICANT CHANGE UP (ref 7–23)
CALCIUM SERPL-MCNC: 9.9 MG/DL — SIGNIFICANT CHANGE UP (ref 8.4–10.5)
CHLORIDE SERPL-SCNC: 92 MMOL/L — LOW (ref 98–107)
CO2 SERPL-SCNC: 28 MMOL/L — SIGNIFICANT CHANGE UP (ref 22–31)
CREAT SERPL-MCNC: 0.63 MG/DL — SIGNIFICANT CHANGE UP (ref 0.5–1.3)
EGFR: 112 ML/MIN/1.73M2 — SIGNIFICANT CHANGE UP
GLUCOSE BLDC GLUCOMTR-MCNC: 136 MG/DL — HIGH (ref 70–99)
GLUCOSE SERPL-MCNC: 127 MG/DL — HIGH (ref 70–99)
HCT VFR BLD CALC: 46.1 % — SIGNIFICANT CHANGE UP (ref 39–50)
HGB BLD-MCNC: 15.1 G/DL — SIGNIFICANT CHANGE UP (ref 13–17)
MAGNESIUM SERPL-MCNC: 1.9 MG/DL — SIGNIFICANT CHANGE UP (ref 1.6–2.6)
MCHC RBC-ENTMCNC: 25.7 PG — LOW (ref 27–34)
MCHC RBC-ENTMCNC: 32.8 GM/DL — SIGNIFICANT CHANGE UP (ref 32–36)
MCV RBC AUTO: 78.5 FL — LOW (ref 80–100)
NRBC # BLD: 0 /100 WBCS — SIGNIFICANT CHANGE UP (ref 0–0)
NRBC # FLD: 0 K/UL — SIGNIFICANT CHANGE UP (ref 0–0)
PHOSPHATE SERPL-MCNC: 4.7 MG/DL — HIGH (ref 2.5–4.5)
PLATELET # BLD AUTO: 245 K/UL — SIGNIFICANT CHANGE UP (ref 150–400)
POTASSIUM SERPL-MCNC: 4.3 MMOL/L — SIGNIFICANT CHANGE UP (ref 3.5–5.3)
POTASSIUM SERPL-SCNC: 4.3 MMOL/L — SIGNIFICANT CHANGE UP (ref 3.5–5.3)
RBC # BLD: 5.87 M/UL — HIGH (ref 4.2–5.8)
RBC # FLD: 15 % — HIGH (ref 10.3–14.5)
SODIUM SERPL-SCNC: 131 MMOL/L — LOW (ref 135–145)
WBC # BLD: 8.29 K/UL — SIGNIFICANT CHANGE UP (ref 3.8–10.5)
WBC # FLD AUTO: 8.29 K/UL — SIGNIFICANT CHANGE UP (ref 3.8–10.5)

## 2023-02-08 PROCEDURE — 99239 HOSP IP/OBS DSCHRG MGMT >30: CPT

## 2023-02-08 RX ORDER — IRBESARTAN 75 MG/1
1 TABLET ORAL
Qty: 0 | Refills: 0 | DISCHARGE

## 2023-02-08 RX ORDER — CEPHALEXIN 500 MG
1 CAPSULE ORAL
Qty: 24 | Refills: 0
Start: 2023-02-08 | End: 2023-02-13

## 2023-02-08 RX ORDER — LANOLIN ALCOHOL/MO/W.PET/CERES
1 CREAM (GRAM) TOPICAL
Qty: 0 | Refills: 0 | DISCHARGE
Start: 2023-02-08

## 2023-02-08 RX ADMIN — Medication 100 MILLIGRAM(S): at 05:21

## 2023-02-08 RX ADMIN — Medication 2000 UNIT(S): at 11:57

## 2023-02-08 RX ADMIN — Medication 1 PATCH: at 11:57

## 2023-02-08 RX ADMIN — Medication 300 MILLIGRAM(S): at 05:22

## 2023-02-08 RX ADMIN — ENOXAPARIN SODIUM 40 MILLIGRAM(S): 100 INJECTION SUBCUTANEOUS at 05:21

## 2023-02-08 RX ADMIN — Medication 1 PATCH: at 12:00

## 2023-02-08 NOTE — DISCHARGE NOTE NURSING/CASE MANAGEMENT/SOCIAL WORK - NSDCFUADDAPPT_GEN_ALL_CORE_FT
DAT WOOD           [id number]                                 1965    Date/Time    Of Service:    2023                                  PICK-UP TIME: 11:30AM—ONLY TIME AVAILABLE    Vendor Name/Contact #:    [facility]    [phone]    Trip #:    [id number]    Confirmed By:    BRIAN    Approval/Auth #:    DORIS INVOICE# [id number]/ONLINE

## 2023-02-08 NOTE — DISCHARGE NOTE NURSING/CASE MANAGEMENT/SOCIAL WORK - PATIENT PORTAL LINK FT
You can access the FollowMyHealth Patient Portal offered by [facility] by registering at the following website: [email or web address]. By joining [facility]’s FollowMyHealth portal, you will also be able to view your health information using other applications (apps) compatible with our system.

## 2023-02-08 NOTE — DISCHARGE NOTE NURSING/CASE MANAGEMENT/SOCIAL WORK - NSDCPEFALRISK_GEN_ALL_CORE
For information on Fall & Injury Prevention, visit: [email or web address] OR  [email or web address] OR  https://www.cdc.gov/steadi/patient.html

## 2023-02-08 NOTE — DISCHARGE NOTE NURSING/CASE MANAGEMENT/SOCIAL WORK - NSSCNAMETXT_GEN_ALL_CORE
[facility] [phone] Nurse to visit on the day following discharge. Other appropriate services to be arranged thereafter. Please contact the home care agency at the above phone number if you have not heard from them by approximately 12 noon on the day after your hospital discharge.

## 2023-02-09 ENCOUNTER — APPOINTMENT (OUTPATIENT)
Dept: RHEUMATOLOGY | Facility: CLINIC | Age: 58
End: 2023-02-09
Payer: MEDICARE

## 2023-02-09 VITALS
DIASTOLIC BLOOD PRESSURE: 90 MMHG | RESPIRATION RATE: 16 BRPM | HEART RATE: 79 BPM | OXYGEN SATURATION: 96 % | SYSTOLIC BLOOD PRESSURE: 173 MMHG

## 2023-02-09 VITALS
SYSTOLIC BLOOD PRESSURE: 165 MMHG | HEART RATE: 85 BPM | RESPIRATION RATE: 16 BRPM | TEMPERATURE: 97.6 F | DIASTOLIC BLOOD PRESSURE: 94 MMHG | OXYGEN SATURATION: 95 %

## 2023-02-09 PROBLEM — E87.1 HYPO-OSMOLALITY AND HYPONATREMIA: Chronic | Status: ACTIVE | Noted: 2023-02-04

## 2023-02-09 PROBLEM — F31.9 BIPOLAR DISORDER, UNSPECIFIED: Chronic | Status: ACTIVE | Noted: 2023-02-04

## 2023-02-09 PROCEDURE — 96366 THER/PROPH/DIAG IV INF ADDON: CPT

## 2023-02-09 PROCEDURE — 96365 THER/PROPH/DIAG IV INF INIT: CPT

## 2023-02-09 RX ORDER — IMMUNE GLOBULIN INTRAVENOUS (HUMAN) 10 G
10 KIT INTRAVENOUS
Qty: 0 | Refills: 0 | Status: COMPLETED | OUTPATIENT
Start: 2023-01-27

## 2023-02-09 RX ORDER — ACETAMINOPHEN 325 MG/1
325 TABLET ORAL
Qty: 0 | Refills: 0 | Status: COMPLETED | OUTPATIENT
Start: 2023-01-27

## 2023-02-09 RX ORDER — DIPHENHYDRAMINE HCL 25 MG/1
25 TABLET ORAL
Qty: 0 | Refills: 0 | Status: COMPLETED | OUTPATIENT
Start: 2023-01-27

## 2023-02-09 NOTE — HISTORY OF PRESENT ILLNESS
[N/A] : N/A [Denies] : Denies [No] : No [Yes] : Yes [22g] : 22g [Medication Name: ___] : Medication Name: [unfilled] [Start Time: ___] : Medication Start Time: [unfilled] [End Time: ___] : Medication End Time: [unfilled] [IV discontinued. Intact. No signs or symptoms of IV complications noted. Time: ___] : IV discontinued. Intact. No signs or symptoms of IV complications noted. Time: [unfilled] [Patient  instructed to seek medical attention with signs and symptoms of adverse effects] : Patient  instructed to seek medical attention with signs and symptoms of adverse effects [Patient left unit in no acute distress] : Patient left unit in no acute distress [Medications administered as ordered and tolerated well.] : Medications administered as ordered and tolerated well. [de-identified] : Right hand dorsal vein [de-identified] : no labs  [de-identified] : Patient presents for Gammagard S/D infusion in [facility]. Patient reports recent hospitalization for infected left leg ulcer/wound/ treated with IV ABX , and discharged with oral ABX. Patient otherwise denies having any other infections since his last treatment. Patient noted with elevated blood pressure pre and post infusion/ remains asymptomatic.No other concerns verbalized. Patient pre-medicated, titrated infusion as per protocol and tolerated well.

## 2023-02-16 ENCOUNTER — APPOINTMENT (OUTPATIENT)
Dept: ALLERGY | Facility: CLINIC | Age: 58
End: 2023-02-16
Payer: MEDICARE

## 2023-02-16 VITALS
DIASTOLIC BLOOD PRESSURE: 86 MMHG | TEMPERATURE: 98.3 F | OXYGEN SATURATION: 95 % | SYSTOLIC BLOOD PRESSURE: 164 MMHG | HEART RATE: 78 BPM

## 2023-02-16 PROCEDURE — 99214 OFFICE O/P EST MOD 30 MIN: CPT

## 2023-02-16 RX ORDER — MUPIROCIN 2 G/100G
2 CREAM TOPICAL
Qty: 1 | Refills: 0 | Status: ACTIVE | COMMUNITY
Start: 2023-02-16 | End: 1900-01-01

## 2023-02-16 NOTE — ASSESSMENT
[FreeTextEntry1] : CVID - \par \par Continue Gammagard IVIG infusion - patient presently receiving 70 gm every 4 weeks (500 mg/kg) \par Recheck IgG level in 2 months \par \par Mild intermittent asthma:\par \par Continue albuterol QID prn \par \par Chronic tobacco abuse - patient resistant to quitting cigarettes - he admits that he will not quit and he has increased the frequency of his smoking \par \par Recent episode of cellulitis - he has follow up with PCP \par \par Nasal crusting - Bactroban on Q tip TID x 1 week. \par \par RV 3-4 months

## 2023-02-16 NOTE — PHYSICAL EXAM
[Alert] : alert [No Acute Distress] : no acute distress [Normal Voice/Communication] : normal voice communication [No Neck Mass] : no neck mass was observed [No LAD] : no lymphadenopathy [Normal Rate] : heart rate was normal  [Normal S1, S2] : normal S1 and S2 [No murmur] : no murmur [Regular Rhythm] : with a regular rhythm [Normal Cervical Lymph Nodes] : cervical [No Rash] : no rash [No clubbing] : no clubbing [No Edema] : no edema [No Cyanosis] : no cyanosis [de-identified] : obese  [de-identified] : adentulous - crusted mucus in both nostrils  [de-identified] : scant exp wheeze  [de-identified] : LE in wrap  [de-identified] : unkempt male

## 2023-02-16 NOTE — HISTORY OF PRESENT ILLNESS
[de-identified] : Patient admitted to [facility] for cellulitis - hospitalized x 5 D - presently taking Keflex 500 mg QID - he has follow up appointment with Dr. Mcclendon.   No other infections - he continues with IVIG x 1 month.   He has been using eucalyptus cream in his nose and now notes a rash in his nostrils.   Patient has nurse come to home to clean wound and wrap

## 2023-02-16 NOTE — SOCIAL HISTORY
[House] : [unfilled] lives in a house  [Cat] : cat [Single] : single [de-identified] : lives  [FreeTextEntry2] : on disability

## 2023-02-24 RX ADMIN — IMMUNE GLOBULIN INTRAVENOUS (HUMAN) 0 GM: KIT at 00:00

## 2023-03-09 ENCOUNTER — APPOINTMENT (OUTPATIENT)
Dept: RHEUMATOLOGY | Facility: CLINIC | Age: 58
End: 2023-03-09
Payer: MEDICARE

## 2023-03-09 VITALS
SYSTOLIC BLOOD PRESSURE: 171 MMHG | TEMPERATURE: 98 F | HEART RATE: 82 BPM | WEIGHT: 300 LBS | BODY MASS INDEX: 38.52 KG/M2 | OXYGEN SATURATION: 95 % | RESPIRATION RATE: 16 BRPM | DIASTOLIC BLOOD PRESSURE: 83 MMHG

## 2023-03-09 VITALS — SYSTOLIC BLOOD PRESSURE: 152 MMHG | OXYGEN SATURATION: 100 % | DIASTOLIC BLOOD PRESSURE: 95 MMHG | HEART RATE: 96 BPM

## 2023-03-09 PROCEDURE — 96365 THER/PROPH/DIAG IV INF INIT: CPT

## 2023-03-09 PROCEDURE — 96366 THER/PROPH/DIAG IV INF ADDON: CPT

## 2023-03-09 RX ORDER — DIPHENHYDRAMINE HCL 25 MG/1
25 TABLET ORAL
Qty: 0 | Refills: 0 | Status: COMPLETED | OUTPATIENT
Start: 2023-03-09

## 2023-03-09 RX ORDER — DIPHENHYDRAMINE HCL 25 MG/1
25 TABLET ORAL
Qty: 0 | Refills: 0 | Status: COMPLETED | OUTPATIENT
Start: 2022-05-31 | End: 1900-01-01

## 2023-03-09 RX ORDER — ACETAMINOPHEN 325 MG/1
325 TABLET ORAL
Qty: 0 | Refills: 0 | Status: COMPLETED | OUTPATIENT
Start: 2023-03-09

## 2023-03-09 RX ORDER — IMMUNE GLOBULIN INTRAVENOUS (HUMAN) 10 G
10 KIT INTRAVENOUS
Qty: 0 | Refills: 0 | Status: COMPLETED | OUTPATIENT
Start: 2022-05-31 | End: 1900-01-01

## 2023-03-09 RX ORDER — ACETAMINOPHEN 325 MG/1
325 TABLET ORAL
Qty: 0 | Refills: 0 | Status: COMPLETED | OUTPATIENT
Start: 2022-05-31 | End: 1900-01-01

## 2023-03-09 RX ORDER — IMMUNE GLOBULIN INTRAVENOUS (HUMAN) 10 G
10 KIT INTRAVENOUS
Qty: 0 | Refills: 0 | Status: COMPLETED | OUTPATIENT
Start: 2023-03-09

## 2023-03-09 RX ADMIN — IMMUNE GLOBULIN INTRAVENOUS (HUMAN) 0 GM: KIT at 00:00

## 2023-03-09 RX ADMIN — ACETAMINOPHEN 0 MG: 325 TABLET ORAL at 00:00

## 2023-03-09 RX ADMIN — DIPHENHYDRAMINE HCL 0 MG: 25 TABLET ORAL at 00:00

## 2023-03-09 NOTE — HISTORY OF PRESENT ILLNESS
[N/A] : N/A [Denies] : Denies [No] : No [Yes] : Yes [Left upper extremity] : Left upper extremity [24g] : 24g [Medication Name: ___] : Medication Name: [unfilled] [Start Time: ___] : Medication Start Time: [unfilled] [End Time: ___] : Medication End Time: [unfilled] [IV discontinued. Intact. No signs or symptoms of IV complications noted. Time: ___] : IV discontinued. Intact. No signs or symptoms of IV complications noted. Time: [unfilled] [Patient  instructed to seek medical attention with signs and symptoms of adverse effects] : Patient  instructed to seek medical attention with signs and symptoms of adverse effects [Patient left unit in no acute distress] : Patient left unit in no acute distress [Medications administered as ordered and tolerated well.] : Medications administered as ordered and tolerated well. [de-identified] : hand dorsal vein [de-identified] : no labs  [de-identified] : Patient presents for Gammagard S/D infusion in [facility]. Patient presents disheveled, drowsy, admits to poor sleep last night. Admits to completing oral antibiotics as reported with last infusion, states wound remains, planned for follow up with MD as per patient. Patient otherwise denies having any s/s infections since his last treatment. Patient noted with elevated blood pressure pre and post infusion/ remains asymptomatic. No other concerns verbalized. Patient pre-medicated, titrated infusion as per protocol and tolerated well.

## 2023-03-14 ENCOUNTER — APPOINTMENT (OUTPATIENT)
Dept: WOUND CARE | Facility: CLINIC | Age: 58
End: 2023-03-14

## 2023-03-15 ENCOUNTER — INPATIENT (INPATIENT)
Facility: HOSPITAL | Age: 58
LOS: 13 days | Discharge: NURSING, REHAB OR LONG-TERM CARE | End: 2023-03-29
Attending: HOSPITALIST | Admitting: HOSPITALIST
Payer: MEDICARE

## 2023-03-15 VITALS
DIASTOLIC BLOOD PRESSURE: 84 MMHG | TEMPERATURE: 99 F | OXYGEN SATURATION: 100 % | HEART RATE: 82 BPM | HEIGHT: 74 IN | RESPIRATION RATE: 16 BRPM | SYSTOLIC BLOOD PRESSURE: 155 MMHG

## 2023-03-15 DIAGNOSIS — F25.9 SCHIZOAFFECTIVE DISORDER, UNSPECIFIED: ICD-10-CM

## 2023-03-15 DIAGNOSIS — E87.1 HYPO-OSMOLALITY AND HYPONATREMIA: ICD-10-CM

## 2023-03-15 DIAGNOSIS — D83.9 COMMON VARIABLE IMMUNODEFICIENCY, UNSPECIFIED: ICD-10-CM

## 2023-03-15 DIAGNOSIS — Z29.9 ENCOUNTER FOR PROPHYLACTIC MEASURES, UNSPECIFIED: ICD-10-CM

## 2023-03-15 DIAGNOSIS — L03.90 CELLULITIS, UNSPECIFIED: ICD-10-CM

## 2023-03-15 DIAGNOSIS — F17.200 NICOTINE DEPENDENCE, UNSPECIFIED, UNCOMPLICATED: ICD-10-CM

## 2023-03-15 DIAGNOSIS — L03.317 CELLULITIS OF BUTTOCK: ICD-10-CM

## 2023-03-15 DIAGNOSIS — R79.89 OTHER SPECIFIED ABNORMAL FINDINGS OF BLOOD CHEMISTRY: ICD-10-CM

## 2023-03-15 DIAGNOSIS — I10 ESSENTIAL (PRIMARY) HYPERTENSION: ICD-10-CM

## 2023-03-15 DIAGNOSIS — K08.199 COMPLETE LOSS OF TEETH DUE TO OTHER SPECIFIED CAUSE, UNSPECIFIED CLASS: Chronic | ICD-10-CM

## 2023-03-15 DIAGNOSIS — J34.2 DEVIATED NASAL SEPTUM: Chronic | ICD-10-CM

## 2023-03-15 LAB
ALBUMIN SERPL ELPH-MCNC: 3.4 G/DL — SIGNIFICANT CHANGE UP (ref 3.3–5)
ALP SERPL-CCNC: 163 U/L — HIGH (ref 40–120)
ALT FLD-CCNC: 96 U/L — HIGH (ref 4–41)
ANION GAP SERPL CALC-SCNC: 8 MMOL/L — SIGNIFICANT CHANGE UP (ref 7–14)
APPEARANCE UR: CLEAR — SIGNIFICANT CHANGE UP
APTT BLD: 27.4 SEC — SIGNIFICANT CHANGE UP (ref 27–36.3)
AST SERPL-CCNC: 186 U/L — HIGH (ref 4–40)
BASOPHILS # BLD AUTO: 0 K/UL — SIGNIFICANT CHANGE UP (ref 0–0.2)
BASOPHILS NFR BLD AUTO: 0 % — SIGNIFICANT CHANGE UP (ref 0–2)
BILIRUB SERPL-MCNC: 0.2 MG/DL — SIGNIFICANT CHANGE UP (ref 0.2–1.2)
BILIRUB UR-MCNC: NEGATIVE — SIGNIFICANT CHANGE UP
BLOOD GAS VENOUS COMPREHENSIVE RESULT: SIGNIFICANT CHANGE UP
BUN SERPL-MCNC: 11 MG/DL — SIGNIFICANT CHANGE UP (ref 7–23)
CALCIUM SERPL-MCNC: 9 MG/DL — SIGNIFICANT CHANGE UP (ref 8.4–10.5)
CHLORIDE SERPL-SCNC: 90 MMOL/L — LOW (ref 98–107)
CHLORIDE UR-SCNC: <20 MMOL/L — SIGNIFICANT CHANGE UP
CK SERPL-CCNC: 2285 U/L — HIGH (ref 30–200)
CO2 SERPL-SCNC: 31 MMOL/L — SIGNIFICANT CHANGE UP (ref 22–31)
COLOR SPEC: COLORLESS — SIGNIFICANT CHANGE UP
CREAT ?TM UR-MCNC: 13 MG/DL — SIGNIFICANT CHANGE UP
CREAT SERPL-MCNC: 0.7 MG/DL — SIGNIFICANT CHANGE UP (ref 0.5–1.3)
DIFF PNL FLD: NEGATIVE — SIGNIFICANT CHANGE UP
EGFR: 109 ML/MIN/1.73M2 — SIGNIFICANT CHANGE UP
EOSINOPHIL # BLD AUTO: 0 K/UL — SIGNIFICANT CHANGE UP (ref 0–0.5)
EOSINOPHIL NFR BLD AUTO: 0 % — SIGNIFICANT CHANGE UP (ref 0–6)
FLUAV AG NPH QL: SIGNIFICANT CHANGE UP
FLUBV AG NPH QL: SIGNIFICANT CHANGE UP
GLUCOSE SERPL-MCNC: 112 MG/DL — HIGH (ref 70–99)
GLUCOSE UR QL: NEGATIVE — SIGNIFICANT CHANGE UP
HCT VFR BLD CALC: 39.9 % — SIGNIFICANT CHANGE UP (ref 39–50)
HGB BLD-MCNC: 13.3 G/DL — SIGNIFICANT CHANGE UP (ref 13–17)
IANC: 9.6 K/UL — HIGH (ref 1.8–7.4)
INR BLD: 1.03 RATIO — SIGNIFICANT CHANGE UP (ref 0.88–1.16)
KETONES UR-MCNC: NEGATIVE — SIGNIFICANT CHANGE UP
LEUKOCYTE ESTERASE UR-ACNC: NEGATIVE — SIGNIFICANT CHANGE UP
LYMPHOCYTES # BLD AUTO: 1.61 K/UL — SIGNIFICANT CHANGE UP (ref 1–3.3)
LYMPHOCYTES # BLD AUTO: 12.5 % — LOW (ref 13–44)
MCHC RBC-ENTMCNC: 26 PG — LOW (ref 27–34)
MCHC RBC-ENTMCNC: 33.3 GM/DL — SIGNIFICANT CHANGE UP (ref 32–36)
MCV RBC AUTO: 77.9 FL — LOW (ref 80–100)
MONOCYTES # BLD AUTO: 1.03 K/UL — HIGH (ref 0–0.9)
MONOCYTES NFR BLD AUTO: 8 % — SIGNIFICANT CHANGE UP (ref 2–14)
NEUTROPHILS # BLD AUTO: 10.22 K/UL — HIGH (ref 1.8–7.4)
NEUTROPHILS NFR BLD AUTO: 78.6 % — HIGH (ref 43–77)
NITRITE UR-MCNC: NEGATIVE — SIGNIFICANT CHANGE UP
OSMOLALITY SERPL: 269 MOSM/KG — LOW (ref 275–295)
OSMOLALITY UR: 94 MOSM/KG — SIGNIFICANT CHANGE UP (ref 50–1200)
PH UR: 7 — SIGNIFICANT CHANGE UP (ref 5–8)
PLATELET # BLD AUTO: 217 K/UL — SIGNIFICANT CHANGE UP (ref 150–400)
POTASSIUM SERPL-MCNC: 4.1 MMOL/L — SIGNIFICANT CHANGE UP (ref 3.5–5.3)
POTASSIUM SERPL-SCNC: 4.1 MMOL/L — SIGNIFICANT CHANGE UP (ref 3.5–5.3)
POTASSIUM UR-SCNC: 4.6 MMOL/L — SIGNIFICANT CHANGE UP
PROT SERPL-MCNC: 6.2 G/DL — SIGNIFICANT CHANGE UP (ref 6–8.3)
PROT UR-MCNC: ABNORMAL
PROTHROM AB SERPL-ACNC: 12 SEC — SIGNIFICANT CHANGE UP (ref 10.5–13.4)
RBC # BLD: 5.12 M/UL — SIGNIFICANT CHANGE UP (ref 4.2–5.8)
RBC # FLD: 15.3 % — HIGH (ref 10.3–14.5)
RSV RNA NPH QL NAA+NON-PROBE: SIGNIFICANT CHANGE UP
SARS-COV-2 RNA SPEC QL NAA+PROBE: SIGNIFICANT CHANGE UP
SODIUM SERPL-SCNC: 129 MMOL/L — LOW (ref 135–145)
SODIUM UR-SCNC: <20 MMOL/L — SIGNIFICANT CHANGE UP
SP GR SPEC: 1 — LOW (ref 1.01–1.05)
UROBILINOGEN FLD QL: SIGNIFICANT CHANGE UP
WBC # BLD: 12.85 K/UL — HIGH (ref 3.8–10.5)
WBC # FLD AUTO: 12.85 K/UL — HIGH (ref 3.8–10.5)

## 2023-03-15 PROCEDURE — 71046 X-RAY EXAM CHEST 2 VIEWS: CPT | Mod: 26

## 2023-03-15 PROCEDURE — 74177 CT ABD & PELVIS W/CONTRAST: CPT | Mod: 26,MA

## 2023-03-15 PROCEDURE — 99223 1ST HOSP IP/OBS HIGH 75: CPT

## 2023-03-15 PROCEDURE — 99285 EMERGENCY DEPT VISIT HI MDM: CPT

## 2023-03-15 RX ORDER — CEFEPIME 1 G/1
2000 INJECTION, POWDER, FOR SOLUTION INTRAMUSCULAR; INTRAVENOUS EVERY 12 HOURS
Refills: 0 | Status: DISCONTINUED | OUTPATIENT
Start: 2023-03-16 | End: 2023-03-22

## 2023-03-15 RX ORDER — ACETAMINOPHEN 500 MG
1000 TABLET ORAL ONCE
Refills: 0 | Status: COMPLETED | OUTPATIENT
Start: 2023-03-15 | End: 2023-03-15

## 2023-03-15 RX ORDER — ENOXAPARIN SODIUM 100 MG/ML
40 INJECTION SUBCUTANEOUS EVERY 24 HOURS
Refills: 0 | Status: DISCONTINUED | OUTPATIENT
Start: 2023-03-15 | End: 2023-03-29

## 2023-03-15 RX ORDER — ACETAMINOPHEN 500 MG
650 TABLET ORAL EVERY 6 HOURS
Refills: 0 | Status: DISCONTINUED | OUTPATIENT
Start: 2023-03-15 | End: 2023-03-29

## 2023-03-15 RX ORDER — TIOTROPIUM BROMIDE 18 UG/1
2 CAPSULE ORAL; RESPIRATORY (INHALATION) DAILY
Refills: 0 | Status: DISCONTINUED | OUTPATIENT
Start: 2023-03-15 | End: 2023-03-29

## 2023-03-15 RX ORDER — NICOTINE POLACRILEX 2 MG
4 GUM BUCCAL
Refills: 0 | Status: DISCONTINUED | OUTPATIENT
Start: 2023-03-15 | End: 2023-03-29

## 2023-03-15 RX ORDER — LANOLIN ALCOHOL/MO/W.PET/CERES
3 CREAM (GRAM) TOPICAL AT BEDTIME
Refills: 0 | Status: DISCONTINUED | OUTPATIENT
Start: 2023-03-15 | End: 2023-03-29

## 2023-03-15 RX ORDER — CHOLECALCIFEROL (VITAMIN D3) 125 MCG
2000 CAPSULE ORAL DAILY
Refills: 0 | Status: DISCONTINUED | OUTPATIENT
Start: 2023-03-15 | End: 2023-03-29

## 2023-03-15 RX ORDER — LOSARTAN POTASSIUM 100 MG/1
100 TABLET, FILM COATED ORAL DAILY
Refills: 0 | Status: DISCONTINUED | OUTPATIENT
Start: 2023-03-15 | End: 2023-03-29

## 2023-03-15 RX ORDER — VANCOMYCIN HCL 1 G
1000 VIAL (EA) INTRAVENOUS ONCE
Refills: 0 | Status: COMPLETED | OUTPATIENT
Start: 2023-03-15 | End: 2023-03-15

## 2023-03-15 RX ORDER — NICOTINE POLACRILEX 2 MG
1 GUM BUCCAL DAILY
Refills: 0 | Status: DISCONTINUED | OUTPATIENT
Start: 2023-03-15 | End: 2023-03-29

## 2023-03-15 RX ORDER — ALBUTEROL 90 UG/1
2 AEROSOL, METERED ORAL EVERY 6 HOURS
Refills: 0 | Status: DISCONTINUED | OUTPATIENT
Start: 2023-03-15 | End: 2023-03-16

## 2023-03-15 RX ORDER — NICOTINE POLACRILEX 2 MG
1 GUM BUCCAL ONCE
Refills: 0 | Status: COMPLETED | OUTPATIENT
Start: 2023-03-15 | End: 2023-03-15

## 2023-03-15 RX ORDER — HYDROXYZINE HCL 10 MG
50 TABLET ORAL AT BEDTIME
Refills: 0 | Status: DISCONTINUED | OUTPATIENT
Start: 2023-03-15 | End: 2023-03-29

## 2023-03-15 RX ORDER — SODIUM CHLORIDE 9 MG/ML
1000 INJECTION, SOLUTION INTRAVENOUS ONCE
Refills: 0 | Status: COMPLETED | OUTPATIENT
Start: 2023-03-15 | End: 2023-03-15

## 2023-03-15 RX ORDER — CEFEPIME 1 G/1
2000 INJECTION, POWDER, FOR SOLUTION INTRAMUSCULAR; INTRAVENOUS EVERY 12 HOURS
Refills: 0 | Status: DISCONTINUED | OUTPATIENT
Start: 2023-03-15 | End: 2023-03-15

## 2023-03-15 RX ORDER — DILTIAZEM HCL 120 MG
300 CAPSULE, EXT RELEASE 24 HR ORAL DAILY
Refills: 0 | Status: DISCONTINUED | OUTPATIENT
Start: 2023-03-15 | End: 2023-03-29

## 2023-03-15 RX ORDER — VANCOMYCIN HCL 1 G
1250 VIAL (EA) INTRAVENOUS EVERY 8 HOURS
Refills: 0 | Status: DISCONTINUED | OUTPATIENT
Start: 2023-03-15 | End: 2023-03-16

## 2023-03-15 RX ORDER — CEFEPIME 1 G/1
2000 INJECTION, POWDER, FOR SOLUTION INTRAMUSCULAR; INTRAVENOUS ONCE
Refills: 0 | Status: COMPLETED | OUTPATIENT
Start: 2023-03-15 | End: 2023-03-15

## 2023-03-15 RX ORDER — ONDANSETRON 8 MG/1
4 TABLET, FILM COATED ORAL EVERY 8 HOURS
Refills: 0 | Status: DISCONTINUED | OUTPATIENT
Start: 2023-03-15 | End: 2023-03-29

## 2023-03-15 RX ADMIN — CEFEPIME 100 MILLIGRAM(S): 1 INJECTION, POWDER, FOR SOLUTION INTRAMUSCULAR; INTRAVENOUS at 14:12

## 2023-03-15 RX ADMIN — Medication 1000 MILLIGRAM(S): at 12:54

## 2023-03-15 RX ADMIN — Medication 250 MILLIGRAM(S): at 16:54

## 2023-03-15 RX ADMIN — Medication 1 PATCH: at 13:09

## 2023-03-15 RX ADMIN — Medication 400 MILLIGRAM(S): at 12:24

## 2023-03-15 RX ADMIN — SODIUM CHLORIDE 1000 MILLILITER(S): 9 INJECTION, SOLUTION INTRAVENOUS at 13:09

## 2023-03-15 NOTE — ED ADULT NURSE NOTE - CHIEF COMPLAINT
The patient is a 57y Male complaining of psychiatric evaluation. Purse String (Intermediate) Text: Given the location of the defect and the characteristics of the surrounding skin a purse string intermediate closure was deemed most appropriate.  Undermining was performed circumfirentially around the surgical defect.  A purse string suture was then placed and tightened.

## 2023-03-15 NOTE — ED PROVIDER NOTE - PROGRESS NOTE DETAILS
DD ED ATTG: Per sister pt left the hospital most recently 2-3 days ago AMA.  Pt had left the stove on (house was empty) - pt also walked through the cold rain with only a t-shirt on.  Pt has poor judgement and insight.  Per sister when pt starts withdrawing from nicotine he'll just get up and walk out and may need to be sedated.  Pt calm and cooperative right now and AAOx3, but pt's judgement is very bad so pt does not have capacity to leave AMA. Blaine Conn, PGY4: All labs reviewed, elevated WBC count sodium 129, chloride 90, elevated LFTs, elevated LDH.  Urinalysis negative for signs of infection.  Flu and COVID were negative.  CT showing left buttock cellulitis without evidence of abscess or fistulization.  Patient received broad-spectrum antibiotics.  [facility] and will admit to medicine for further evaluation.

## 2023-03-15 NOTE — H&P ADULT - NSHPPHYSICALEXAM_GEN_ALL_CORE
GEN: Appears in no acute distress  HEENT: tracking appropriately neck supple, no lymphadenopathy, no JVD  MOUTH: moist mucous membranes, no exudates or lesions   PULM: Clear to auscultation bilaterally, no wheezes, rales, rhonchi  CV: RRR, S1S2, no murmurs, rubs or gallops  Abdominal: Soft, nontender to palpation, non-distended, normoactive bowel sounds  Extremities: WWP, No edema, + peripheral pulses  NEURO: AAOx3, understands why he is admitted, moving all four extremities spontaneously  Skin: left buttocks: erythematous, indurated, with shallow ulceration (non bleeding/draining) about 4-5 cm, indurated, no fluctuance noted. LLE calf: non healing bleeding wound about 2cm diameter, no superimposed infection

## 2023-03-15 NOTE — PATIENT PROFILE ADULT - NSTOBACCO TYPE_GEN_A_CORE_RD
Ambulatory Care Coordination Note  5/21/2019  CM Risk Score: 5  Niko Mortality Risk Score: 5    ACC: Anibal Zhu RN    Summary Note: ACC spoke to [place] today. Review:  [place] stated he is stable right now, no worsening cough or breathing difficulty. He has chronic LAYTON and uses oxygen continuously. Pt is a smoker and not committed to quit smoking right now. [place] is taking his medications as prescribed and stated he does not have any swelling of his legs/feet right now. Stated his facial swelling is also gone. Plan:  [place] seems to understand need for regular f/u with his PCP and has been compliant to make/keep appts and take his medications. Continue with meds as prescribed. Keep appt on Thursday. Will evaluate for ongoing or new needs at appt on Thursday, consider graduation if pt remains stable. Care Coordination Interventions    Program Enrollment:  Rising Risk  Referral from Primary Care Provider:  No  Suggested Interventions and Community Resources  Transportation Support:  Completed  Zone Management Tools: In Process (Comment: 5/21/19: Pt denied any worsening COPD sx, reported he is taking his medications as prescribed. Pt is aware of f/u appt on Thursday, stated he will be here. Stable at present, will f/u at appt and determine if further needs.)         Goals Addressed                 This Visit's Progress     Wellness Goal   On track     Patient Self-Management Goal for Health Maintenance  Goal: I will schedule a yearly preventative care visit. Barriers: impairment:  hearing  Plan for overcoming my barriers:   Pt will contact [address] for concerns about his healthcare  [place] will schedule his appts as instructed by his provider  Pt will allow contact with his mother or his cousin for appts (he is [facility])  Confidence: 9/10  Anticipated Goal Completion Date: 6/25/19  Frederick Quiroz has new onset swelling of face and BLE.  F/u for definitive dx of cause)            Prior to Admission medications Medication Sig Start Date End Date Taking?  Authorizing Provider   mometasone (NASONEX) 50 MCG/ACT nasal spray 2 sprays by Nasal route daily 5/17/19   VITOR Moreno DO   fluticasone Pipo Makos) 50 MCG/ACT nasal spray 2 sprays by Nasal route daily 5/16/19   VITOR Moreno DO   glycerin-hypromellose- 0.2-0.2-1 % SOLN opthalmic solution Place 1 drop into both eyes 2 times daily    Historical Provider, MD   albuterol sulfate  (90 Base) MCG/ACT inhaler Inhale 2 puffs into the lungs 4 times daily as needed for Wheezing 4/23/19   VITOR Moreno DO   metoprolol succinate (TOPROL XL) 25 MG extended release tablet Take 1 tablet by mouth daily 4/23/19   VITOR Moreno DO   furosemide (LASIX) 40 MG tablet Take 1 tablet by mouth daily 4/23/19   VITOR Moreno DO   lisinopril (PRINIVIL;ZESTRIL) 20 MG tablet Take 1 tablet by mouth daily 2/4/19   VITOR Moreno DO   levothyroxine (SYNTHROID) 75 MCG tablet Take 1 tablet by mouth Daily TAKE 1 TABLET BY MOUTH DAILY 1/31/19   VITOR Moreno DO   Umeclidinium Bromide (INCRUSE ELLIPTA) 62.5 MCG/INH AEPB Inhale 1 puff into the lungs daily 11/12/18   GELY Ramirez   tiZANidine (ZANAFLEX) 4 MG tablet TAKE 1 TABLET BY MOUTH EVERY 8 HOURS AS NEEDED 3/28/18   Lani Louie MD   fluticasone Pipolina Felix) 50 MCG/ACT nasal spray 2 sprays by Nasal route daily 2/1/18   VITOR Moreno DO   ADVAIR DISKUS 250-50 MCG/DOSE AEPB Inhale 1 puff into the lungs 2 times daily 9/21/16   Historical Provider, MD   guaiFENesin 400 MG tablet Take 400 mg by mouth daily     Historical Provider, MD   OXYGEN Inhale into the lungs 3 liters per nasal cannula continuous    Historical Provider, MD       Future Appointments   Date Time Provider Beba Dawson   5/23/2019  4:30 PM [address]   11/4/2019  1:30 PM DO [place] Dubois[place]aaliyah      and   COPD Assessment    Does the patient understand envrionmental exposure?:  Yes  Is the patient able to verbalize Rescue vs. Long Acting medications?:  Yes  Does the patient have a nebulizer?:  No  Does the patient use a space with inhaled medications?:  No            Symptoms:   None:  Yes      Symptom course:  no change  Breathlessness:  minimal exertion  Increase use of rapid acting/rescue inhaled medications?:  No  Change in chronic cough?:  No/At Baseline  Change in sputum?:  No/At Baseline  Self Monitoring - SaO2:  No  Have you had a recent diagnosis of pneumonia either by PCP or at a hospital?:  No Cigarettes

## 2023-03-15 NOTE — H&P ADULT - PROBLEM SELECTOR PLAN 5
possibly also bipolar  currently alert, oriented, understands why he is here  reports of bizarre behavior but none seen on exam today (no evidence of psychosis) but per staff has intermittent confusion. if confused, cannot leave AMA  psych consulted to assist with med management and optimization

## 2023-03-15 NOTE — ED PROVIDER NOTE - OBJECTIVE STATEMENT
57M p/w brought in by Sister keya Arias [phone] for bizarre behavior "delusions"  Pt has history of schizoaffective disorder, his psychiatrist is Dr Cook at [facility] .  Pt c/o "hemorrhoids" at present, no abd pain, no fever.  Reported hx of HTN and DM.  Per note from sister pt has been signing himself out of hospital AMA 2-3 times over the past 2-3 months for "critically low salt levels".  Sister also notes "very high BP" "Chest infection" "COPD", "Wound care "recent cellulitis", "infected nonhealing hemorrhoids.  Per note "not making safe decisions/not thinking clearly".  On exam pt ambulatory but has large area of redness, warmth, tenderness as well as shallow ulcer over R medial buttocks with some swelling perirectally.  Pt will need admission for IV abx, will check CT eval for abscess.  Rx fluids, check labs including urine lytes, sepsis workup.    VS:  unremarkable    GEN - NAD;   malaise;   A+O x3   HEAD - NC/AT     ENT - PEERL, EOMI, mucous membranes  dry , no discharge      NECK: Neck supple, non-tender without lymphadenopathy, no masses, no JVD  PULM - CTA b/l,  symmetric breath sounds  COR -  normal heart sounds    ABD - , Obese abd ND, NT, soft,  BACK - no CVA tenderness, nontender spine     Rectum - large area of redness, warmth, tenderness as well as shallow ulcer over R medial buttocks with some swelling perirectally.    EXTREMS - no edema, no deformity, warm and well perfused    SKIN - no rash    or bruising    except as noted.   NEUROLOGIC - alert, face symmetric, speech fluent, sensation nl, motor no focal deficit. 57M p/w brought in by Sister keya Arias [phone] for bizarre behavior "delusions"  Pt has history of schizoaffective disorder, his psychiatrist is Dr Cook at [facility] .  Pt c/o "hemorrhoids" at present, no abd pain, no fever.  Reported hx of HTN and DM.  Per note from sister pt has been signing himself out of hospital AMA 2-3 times over the past 2-3 months for "critically low salt levels".  Sister also notes "very high BP" "Chest infection" "COPD", "Wound care "recent cellulitis", "infected nonhealing hemorrhoids.  Per note "not making safe decisions/not thinking clearly".  On exam pt ambulatory but has large area of redness, warmth, tenderness as well as shallow ulcer over L medial buttocks with some swelling perirectally.  Pt will need admission for IV abx, will check CT eval for abscess.  Rx fluids, check labs including urine lytes, sepsis workup.    VS:  unremarkable    GEN - NAD;   malaise;   A+O x3   HEAD - NC/AT     ENT - PEERL, EOMI, mucous membranes  dry , no discharge      NECK: Neck supple, non-tender without lymphadenopathy, no masses, no JVD  PULM - CTA b/l,  symmetric breath sounds  COR -  normal heart sounds    ABD - , Obese abd ND, NT, soft,  BACK - no CVA tenderness, nontender spine     Rectum - large area of redness, warmth, tenderness as well as shallow ulcer over L medial buttocks with some swelling perirectally.    EXTREMS - no edema, no deformity, warm and well perfused    SKIN - no rash    or bruising    except as noted.   NEUROLOGIC - alert, face symmetric, speech fluent, sensation nl, motor no focal deficit.

## 2023-03-15 NOTE — H&P ADULT - NSHPLABSRESULTS_GEN_ALL_CORE
13.3   12.85 )-----------( 217      ( 15 Mar 2023 12:18 )             39.9       -15    129<L>  |  90<L>  |  11  ----------------------------<  112<H>  4.1   |  31  |  0.70    Ca    9.0      15 Mar 2023 12:18    TPro  6.2  /  Alb  3.4  /  TBili  0.2  /  DBili  x   /  AST  186<H>  /  ALT  96<H>  /  AlkPhos  163<H>  03-15              Urinalysis Basic - ( 15 Mar 2023 12:26 )    Color: Colorless / Appearance: Clear / S.005 / pH: x  Gluc: x / Ketone: Negative  / Bili: Negative / Urobili: <2 mg/dL   Blood: x / Protein: Trace / Nitrite: Negative   Leuk Esterase: Negative / RBC: x / WBC x   Sq Epi: x / Non Sq Epi: x / Bacteria: x        PT/INR - ( 15 Mar 2023 12:18 )   PT: 12.0 sec;   INR: 1.03 ratio         PTT - ( 15 Mar 2023 12:18 )  PTT:27.4 sec          CAPILLARY BLOOD GLUCOSE      POCT Blood Glucose.: 107 mg/dL (15 Mar 2023 11:04)          CT abd/pelv: IMPRESSION:  Left buttock cellulitis without evidence of abscess. Consider MRI to   evaluate for perianal fistula.

## 2023-03-15 NOTE — ED PROVIDER NOTE - CLINICAL SUMMARY MEDICAL DECISION MAKING FREE TEXT BOX
Blaine Conn, PGY4: 57 year old male p/w "delusions" per family, with recent hospitalization for cellulitis. Family concerned patient may not be able to care for himself at home. L buttock w/ cellulitis, will require CT imaging to r/o fistulization or deep abscess. Plan for labs, abx, CT, admit.

## 2023-03-15 NOTE — H&P ADULT - ASSESSMENT
58 y/o M with hx of multiple recent admissions of LLE cellulitis, hypogammaglobulinemia (on monthly IVIG), schizoaffective d/o (on haldol and Paxil) who was brought in by sister for bizarre behavior/delusional thinking, found to have significant cellulitis of the left buttocks with ulceration. no signs of nec fascitis at this time and no evidence of drainable abscess

## 2023-03-15 NOTE — ED PROVIDER NOTE - PHYSICAL EXAMINATION
VS:  unremarkable    GEN - NAD;   malaise;   A+O x3   HEAD - NC/AT     ENT - PEERL, EOMI, mucous membranes  dry , no discharge      NECK: Neck supple, non-tender without lymphadenopathy, no masses, no JVD  PULM - CTA b/l,  symmetric breath sounds  COR -  normal heart sounds    ABD - , Obese abd ND, NT, soft,  BACK - no CVA tenderness, nontender spine     Rectum - large area of redness, warmth, tenderness as well as shallow ulcer over L medial buttocks with some swelling perirectally.    EXTREMS - no edema, no deformity, warm and well perfused    SKIN - no rash    or bruising    except as noted.   NEUROLOGIC - alert, face symmetric, speech fluent, sensation nl, motor no focal deficit.

## 2023-03-15 NOTE — H&P ADULT - PROBLEM SELECTOR PLAN 3
unclear source, previously has been attributed to Paroxetine  likely is multifactorial  add on urine osmolality  urine sodium is low (first time it's been like that) which suggests hypovolemia (doesn't look like it) vs poly dipsia (per family, drinks a lot of fluids). urine osmolality will help  previous urine studies have mostly been consistent with SIADH (uric acid is also low)  continue to trend, may need water restriction  discuss with psych if paroxetine is needed

## 2023-03-15 NOTE — ED ADULT TRIAGE NOTE - CHIEF COMPLAINT QUOTE
Brought in by sister for psych eval, pt states does not feel safe, sister stating pt is delusional, pt denies si/hi, cooperative in triage, hx of bipolar, schizo-affective disorder

## 2023-03-15 NOTE — ED ADULT NURSE NOTE - OBJECTIVE STATEMENT
A&Ox3. PMH: HTN, DM, Bipolar, chronic hyponatremia, cellulitis left leg. Patient brought in by sister for pysch evaluation. Patient states he has no intention of A&Ox3. PMH: HTN, DM, Bipolar, chronic hyponatremia, cellulitis left leg. Patient brought in by sister for pysch evaluation. Patient states he has no intention of harming himself or others. He states he just smoked a bunch of cigarettes. Patient has a wound outside of his lower left extremity measuring 3X3cm, scabbed over, no drainage. There is another wound on inner left buttocks measuring 2X2cm, also scabbed over with no drainage. Respirations even and unlabored. 20 gauge IV placed in right hand. Labs obtained, awaiting results. Medications given as per current care plan. Patient on 1:1 for elopement possibility. Patient belongings placed in closet across from new CT room.

## 2023-03-15 NOTE — ED PROVIDER NOTE - NSICDXPASTMEDICALHX_GEN_ALL_CORE_FT
PAST MEDICAL HISTORY:  Abscess of finger     Bipolar disorder     Chronic hyponatremia     CVID (common variable immunodeficiency)     DM (diabetes mellitus)     HTN (hypertension)     Smoker

## 2023-03-15 NOTE — H&P ADULT - PROBLEM SELECTOR PLAN 1
will cover broadly with vanc and cefepime given extent  check MRSA nares to de-escalate vanc  previously has responded to first generation cephalosporin (and tolerated, in spite of PCN allergy)  CT Abd/pelv-shows no abscess and no fistula (MRI could be ordered to further eval but no risk factors for fistula formation)  add on CK  low threshold to consult surg (induration may become fluctuance) or ID based on clinical improvement will cover broadly with vanc and cefepime given extent  check MRSA nares to de-escalate vanc  previously has responded to first generation cephalosporin (and tolerated, in spite of PCN allergy)  CT Abd/pelv-shows no abscess and no fistula (MRI could be ordered to further eval but no risk factors for fistula formation)  add on CK  low threshold to consult surg (induration may become fluctuance) or ID based on clinical improvement  wound care consulted. I will resume steroid cream for the LLE calf wound (last rec from derm in dec) x 1 week to see if there is improvement

## 2023-03-15 NOTE — ED ADULT NURSE NOTE - TEMPLATE
ANTICOAGULATION  MANAGEMENT    Assessment     Today's INR result of 1.6 is Subtherapeutic (goal INR of 2.0-3.0)        Warfarin taken as previously instructed    No new diet changes affecting INR    Patient started taking keflex yesterday 9/24 x5 days.  Will finish ABX on 9/28.  Notified of potential interaction between warfarin and keflex and risk of bleeding and increased INR and need to watch INR more closely with concurrent use (Micromedex).  Patient traveling to to [place] on 9/27 in the am by plane and not returning until 10/12 (Saturday).  Unable to check INR when [facility] recommends due to this.     Continues to tolerate warfarin with no reported s/s of bleeding or thromboembolism     Previous INR was Subtherapeutic on current dose     Plan:     Spoke with Pee regarding INR result and instructed:     Warfarin Dosing Instructions:  Change warfarin dose to 7.5 mg daily on Wed, Sat; and 5 mg daily rest of week  (14 % change)    Instructed patient to follow up no later than: 10/14 when returning from trip    Education provided: importance of therapeutic range, target INR goal and significance of current INR result, importance of following up for INR monitoring at instructed interval, potential interaction between warfarin and Keflex, importance of notifying clinic for changes in medications, monitoring for bleeding signs and symptoms, monitoring for clotting signs and symptoms and when to seek medical attention/emergency care and travel education provided     Pee verbalizes understanding and agrees to warfarin dosing plan.    Instructed to call the [facility] for any changes, questions or concerns. (#[phone])   ?   Sara Montoya RN    Subjective/Objective:      Pee Ayala, a 79 y.o. male is on warfarin.     Pee reports:     Home warfarin dose: verbally confirmed home dose with Pee and updated on anticoagulation calendar     Missed doses: No     Medication changes:  Yes: Keflex     S/S of bleeding or  thromboembolism:  No     New Injury or illness:  No     Changes in diet or alcohol consumption:  No     Upcoming surgery, procedure or cardioversion:  No    Anticoagulation Episode Summary     Current INR goal:   2.0-3.0   TTR:   45.8 %   Next INR check:   10/14/2019   INR from last check:   1.60! (9/25/2019)   Weekly max warfarin dose:      Target end date:      INR check location:      Preferred lab:      Send INR reminders to:   [facility]    Indications    Chronic atrial fibrillation (H) [I48.2]           Comments:            Anticoagulation Care Providers     Provider Role Specialty Phone number    Jazzy Gil MD Referring Family Medicine [phone]         General

## 2023-03-15 NOTE — ED PROVIDER NOTE - ATTENDING CONTRIBUTION TO CARE
57M p/w brought in by Sister keya Arias [phone] for bizarre behavior "delusions"  Pt has history of schizoaffective disorder, his psychiatrist is Dr Cook at [facility] .  Pt c/o "hemorrhoids" at present, no abd pain, no fever.  Reported hx of HTN and DM.  Per note from sister pt has been signing himself out of hospital AMA 2-3 times over the past 2-3 months for "critically low salt levels".  Sister also notes "very high BP" "Chest infection" "COPD", "Wound care "recent cellulitis", "infected nonhealing hemorrhoids.  Per note "not making safe decisions/not thinking clearly".  On exam pt ambulatory but has large area of redness, warmth, tenderness as well as shallow ulcer over L medial buttocks with some swelling perirectally.  Pt will need admission for IV abx, will check CT eval for abscess.  Rx fluids, check labs including urine lytes, sepsis workup.    VS:  unremarkable    GEN - NAD;   malaise;   A+O x3   HEAD - NC/AT     ENT - PEERL, EOMI, mucous membranes  dry , no discharge      NECK: Neck supple, non-tender without lymphadenopathy, no masses, no JVD  PULM - CTA b/l,  symmetric breath sounds  COR -  normal heart sounds    ABD - , Obese abd ND, NT, soft,  BACK - no CVA tenderness, nontender spine     Rectum - large area of redness, warmth, tenderness as well as shallow ulcer over L medial buttocks with some swelling perirectally.    EXTREMS - no edema, no deformity, warm and well perfused    SKIN - no rash    or bruising    except as noted.   NEUROLOGIC - alert, face symmetric, speech fluent, sensation nl, motor no focal deficit.

## 2023-03-15 NOTE — PATIENT PROFILE ADULT - FALL HARM RISK - RISK INTERVENTIONS
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
## 2023-03-16 LAB
BASOPHILS # BLD AUTO: 0.03 K/UL — SIGNIFICANT CHANGE UP (ref 0–0.2)
BASOPHILS NFR BLD AUTO: 0.3 % — SIGNIFICANT CHANGE UP (ref 0–2)
CULTURE RESULTS: NO GROWTH — SIGNIFICANT CHANGE UP
EOSINOPHIL # BLD AUTO: 0.14 K/UL — SIGNIFICANT CHANGE UP (ref 0–0.5)
EOSINOPHIL NFR BLD AUTO: 1.5 % — SIGNIFICANT CHANGE UP (ref 0–6)
HCT VFR BLD CALC: 40.7 % — SIGNIFICANT CHANGE UP (ref 39–50)
HGB BLD-MCNC: 13 G/DL — SIGNIFICANT CHANGE UP (ref 13–17)
IANC: 7.41 K/UL — HIGH (ref 1.8–7.4)
IMM GRANULOCYTES NFR BLD AUTO: 2.1 % — HIGH (ref 0–0.9)
LYMPHOCYTES # BLD AUTO: 0.99 K/UL — LOW (ref 1–3.3)
LYMPHOCYTES # BLD AUTO: 10.3 % — LOW (ref 13–44)
MAGNESIUM SERPL-MCNC: 2 MG/DL — SIGNIFICANT CHANGE UP (ref 1.6–2.6)
MCHC RBC-ENTMCNC: 25.4 PG — LOW (ref 27–34)
MCHC RBC-ENTMCNC: 31.9 GM/DL — LOW (ref 32–36)
MCV RBC AUTO: 79.5 FL — LOW (ref 80–100)
MONOCYTES # BLD AUTO: 0.81 K/UL — SIGNIFICANT CHANGE UP (ref 0–0.9)
MONOCYTES NFR BLD AUTO: 8.5 % — SIGNIFICANT CHANGE UP (ref 2–14)
NEUTROPHILS # BLD AUTO: 7.41 K/UL — HIGH (ref 1.8–7.4)
NEUTROPHILS NFR BLD AUTO: 77.3 % — HIGH (ref 43–77)
NRBC # BLD: 0 /100 WBCS — SIGNIFICANT CHANGE UP (ref 0–0)
NRBC # FLD: 0 K/UL — SIGNIFICANT CHANGE UP (ref 0–0)
PHOSPHATE SERPL-MCNC: 3.8 MG/DL — SIGNIFICANT CHANGE UP (ref 2.5–4.5)
PLATELET # BLD AUTO: 211 K/UL — SIGNIFICANT CHANGE UP (ref 150–400)
RBC # BLD: 5.12 M/UL — SIGNIFICANT CHANGE UP (ref 4.2–5.8)
RBC # FLD: 15.7 % — HIGH (ref 10.3–14.5)
[facility] SOURCE: SIGNIFICANT CHANGE UP
VANCOMYCIN TROUGH SERPL-MCNC: <4 UG/ML — LOW (ref 10–20)
WBC # BLD: 9.58 K/UL — SIGNIFICANT CHANGE UP (ref 3.8–10.5)
WBC # FLD AUTO: 9.58 K/UL — SIGNIFICANT CHANGE UP (ref 3.8–10.5)

## 2023-03-16 PROCEDURE — 99222 1ST HOSP IP/OBS MODERATE 55: CPT

## 2023-03-16 PROCEDURE — 90792 PSYCH DIAG EVAL W/MED SRVCS: CPT

## 2023-03-16 PROCEDURE — 93010 ELECTROCARDIOGRAM REPORT: CPT

## 2023-03-16 RX ORDER — SODIUM CHLORIDE 9 MG/ML
1000 INJECTION INTRAMUSCULAR; INTRAVENOUS; SUBCUTANEOUS
Refills: 0 | Status: DISCONTINUED | OUTPATIENT
Start: 2023-03-16 | End: 2023-03-17

## 2023-03-16 RX ORDER — VANCOMYCIN HCL 1 G
1250 VIAL (EA) INTRAVENOUS ONCE
Refills: 0 | Status: COMPLETED | OUTPATIENT
Start: 2023-03-16 | End: 2023-03-16

## 2023-03-16 RX ORDER — ALBUTEROL 90 UG/1
2 AEROSOL, METERED ORAL EVERY 6 HOURS
Refills: 0 | Status: DISCONTINUED | OUTPATIENT
Start: 2023-03-16 | End: 2023-03-29

## 2023-03-16 RX ORDER — LIDOCAINE HCL 20 MG/ML
20 VIAL (ML) INJECTION ONCE
Refills: 0 | Status: DISCONTINUED | OUTPATIENT
Start: 2023-03-16 | End: 2023-03-21

## 2023-03-16 RX ORDER — VANCOMYCIN HCL 1 G
1250 VIAL (EA) INTRAVENOUS EVERY 8 HOURS
Refills: 0 | Status: DISCONTINUED | OUTPATIENT
Start: 2023-03-17 | End: 2023-03-18

## 2023-03-16 RX ORDER — METRONIDAZOLE 500 MG
500 TABLET ORAL
Refills: 0 | Status: DISCONTINUED | OUTPATIENT
Start: 2023-03-16 | End: 2023-03-22

## 2023-03-16 RX ORDER — IPRATROPIUM/ALBUTEROL SULFATE 18-103MCG
3 AEROSOL WITH ADAPTER (GRAM) INHALATION EVERY 6 HOURS
Refills: 0 | Status: DISCONTINUED | OUTPATIENT
Start: 2023-03-16 | End: 2023-03-29

## 2023-03-16 RX ORDER — VANCOMYCIN HCL 1 G
1500 VIAL (EA) INTRAVENOUS EVERY 8 HOURS
Refills: 0 | Status: DISCONTINUED | OUTPATIENT
Start: 2023-03-16 | End: 2023-03-16

## 2023-03-16 RX ORDER — IPRATROPIUM/ALBUTEROL SULFATE 18-103MCG
3 AEROSOL WITH ADAPTER (GRAM) INHALATION ONCE
Refills: 0 | Status: DISCONTINUED | OUTPATIENT
Start: 2023-03-16 | End: 2023-03-16

## 2023-03-16 RX ORDER — IPRATROPIUM/ALBUTEROL SULFATE 18-103MCG
3 AEROSOL WITH ADAPTER (GRAM) INHALATION EVERY 6 HOURS
Refills: 0 | Status: DISCONTINUED | OUTPATIENT
Start: 2023-03-16 | End: 2023-03-16

## 2023-03-16 RX ADMIN — Medication 1 PATCH: at 12:26

## 2023-03-16 RX ADMIN — Medication 300 MILLIGRAM(S): at 05:33

## 2023-03-16 RX ADMIN — ALBUTEROL 2 PUFF(S): 90 AEROSOL, METERED ORAL at 20:07

## 2023-03-16 RX ADMIN — LOSARTAN POTASSIUM 100 MILLIGRAM(S): 100 TABLET, FILM COATED ORAL at 05:33

## 2023-03-16 RX ADMIN — CEFEPIME 100 MILLIGRAM(S): 1 INJECTION, POWDER, FOR SOLUTION INTRAMUSCULAR; INTRAVENOUS at 05:34

## 2023-03-16 RX ADMIN — ENOXAPARIN SODIUM 40 MILLIGRAM(S): 100 INJECTION SUBCUTANEOUS at 19:12

## 2023-03-16 RX ADMIN — Medication 650 MILLIGRAM(S): at 05:37

## 2023-03-16 RX ADMIN — Medication 650 MILLIGRAM(S): at 06:37

## 2023-03-16 RX ADMIN — Medication 1 PATCH: at 13:52

## 2023-03-16 RX ADMIN — CEFEPIME 100 MILLIGRAM(S): 1 INJECTION, POWDER, FOR SOLUTION INTRAMUSCULAR; INTRAVENOUS at 14:38

## 2023-03-16 RX ADMIN — Medication 166.67 MILLIGRAM(S): at 05:34

## 2023-03-16 RX ADMIN — Medication 1 APPLICATION(S): at 05:34

## 2023-03-16 RX ADMIN — TIOTROPIUM BROMIDE 2 PUFF(S): 18 CAPSULE ORAL; RESPIRATORY (INHALATION) at 14:42

## 2023-03-16 RX ADMIN — Medication 1 PATCH: at 17:57

## 2023-03-16 RX ADMIN — Medication 1 PATCH: at 07:17

## 2023-03-16 RX ADMIN — Medication 2000 UNIT(S): at 13:53

## 2023-03-16 RX ADMIN — Medication 500 MILLIGRAM(S): at 20:06

## 2023-03-16 RX ADMIN — Medication 1 APPLICATION(S): at 19:15

## 2023-03-16 RX ADMIN — Medication 166.67 MILLIGRAM(S): at 15:35

## 2023-03-16 NOTE — PROGRESS NOTE ADULT - NSPROGADDITIONALINFOA_GEN_ALL_CORE
AM labs pending.  repeat labs BMP and LFTs/CPK in am.     d/w pt and PA.    - Dr. JAKE Morales ([facility])  - [phone]

## 2023-03-16 NOTE — CONSULT NOTE ADULT - ASSESSMENT
1) Buttock wounds          2) Elevation in CPk  ? due to tissue injury  Can be seen with NMS- monitor closely  trend CPK  hydrate    3) Tranaminitis  Check acute hepatitis panel  Trend    4) CVID  Check immuoglobulins   57 year old with schizophrenia presents with a change in behavior.  Noted to have a left gluteal wound.    1) Left Buttock wound  Imaging and history raise concern for an underlying fistula  Consider surgery eval    Continue cefepime/ add flagyl 500 mg po q 12  Check MRSA screen, if negative stop vancomycin  Wound care eval    2) Elevation in CPK  Seems unlikely to be due to tissue injury  trend CPK  hydrate    3) Tranaminitis  Check acute hepatitis panel  Trend    4) CVID  Check immuoglobulins  On imunoglobulin treatment monthly    I will be away as on 3/17, ID service to follow.

## 2023-03-16 NOTE — BH CONSULTATION LIAISON ASSESSMENT NOTE - CURRENT MEDICATION
MEDICATIONS  (STANDING):  cefepime   IVPB 2000 milliGRAM(s) IV Intermittent every 12 hours  cholecalciferol 2000 Unit(s) Oral daily  clobetasol 0.05% Ointment 1 Application(s) Topical two times a day  diltiazem    milliGRAM(s) Oral daily  enoxaparin Injectable 40 milliGRAM(s) SubCutaneous every 24 hours  losartan 100 milliGRAM(s) Oral daily  nicotine - 21 mG/24Hr(s) Patch 1 Patch Transdermal daily  tiotropium 2.5 MICROgram(s) Inhaler 2 Puff(s) Inhalation daily  vancomycin  IVPB 1250 milliGRAM(s) IV Intermittent every 8 hours    MEDICATIONS  (PRN):  acetaminophen     Tablet .. 650 milliGRAM(s) Oral every 6 hours PRN Temp greater or equal to 38C (100.4F), Mild Pain (1 - 3)  albuterol    90 MICROgram(s) HFA Inhaler 2 Puff(s) Inhalation every 6 hours PRN Shortness of Breath and/or Wheezing  aluminum hydroxide/magnesium hydroxide/simethicone Suspension 30 milliLiter(s) Oral every 4 hours PRN Dyspepsia  hydrOXYzine hydrochloride 50 milliGRAM(s) Oral at bedtime PRN Anxiety  melatonin 3 milliGRAM(s) Oral at bedtime PRN Insomnia  nicotine  Polacrilex Lozenge 4 milliGRAM(s) Oral every 2 hours PRN craving/withdrawal  ondansetron Injectable 4 milliGRAM(s) IV Push every 8 hours PRN Nausea and/or Vomiting

## 2023-03-16 NOTE — PROGRESS NOTE ADULT - SUBJECTIVE AND OBJECTIVE BOX
SUBJECTIVE/ OVERNIGHT EVENTS:  awake alert  feels okay  denied pain  no cp, no sob, no n/v/d. no abdominal pain.  no headache, no dizziness.   surgery and ID consulted        --------------------------------------------------------------------------------------------  LABS:                        13.0   9.58  )-----------( 211      ( 16 Mar 2023 07:05 )             40.7     03-15    129<L>  |  90<L>  |  11  ----------------------------<  112<H>  4.1   |  31  |  0.70    Ca    9.0      15 Mar 2023 12:18  Phos  3.8     03-16  Mg     2.00     03-16    TPro  6.2  /  Alb  3.4  /  TBili  0.2  /  DBili  x   /  AST  186<H>  /  ALT  96<H>  /  AlkPhos  163<H>  03-15    PT/INR - ( 15 Mar 2023 12:18 )   PT: 12.0 sec;   INR: 1.03 ratio         PTT - ( 15 Mar 2023 12:18 )  PTT:27.4 sec  CAPILLARY BLOOD GLUCOSE      POCT Blood Glucose.: 273 mg/dL (15 Mar 2023 22:19)    CARDIAC MARKERS ( 15 Mar 2023 12:18 )  x     / x     / 2285 U/L / x     / x          Urinalysis Basic - ( 15 Mar 2023 12:26 )    Color: Colorless / Appearance: Clear / S.005 / pH: x  Gluc: x / Ketone: Negative  / Bili: Negative / Urobili: <2 mg/dL   Blood: x / Protein: Trace / Nitrite: Negative   Leuk Esterase: Negative / RBC: x / WBC x   Sq Epi: x / Non Sq Epi: x / Bacteria: x        RADIOLOGY & ADDITIONAL TESTS:    Imaging Personally Reviewed:  [x] YES  [ ] NO    Consultant(s) Notes Reviewed:  [x] YES  [ ] NO    MEDICATIONS  (STANDING):  albuterol    90 MICROgram(s) HFA Inhaler 2 Puff(s) Inhalation every 6 hours  cefepime   IVPB 2000 milliGRAM(s) IV Intermittent every 12 hours  cholecalciferol 2000 Unit(s) Oral daily  clobetasol 0.05% Ointment 1 Application(s) Topical two times a day  diltiazem    milliGRAM(s) Oral daily  enoxaparin Injectable 40 milliGRAM(s) SubCutaneous every 24 hours  lidocaine 1% Injectable 20 milliLiter(s) Local Injection once  losartan 100 milliGRAM(s) Oral daily  metroNIDAZOLE    Tablet 500 milliGRAM(s) Oral two times a day  nicotine - 21 mG/24Hr(s) Patch 1 Patch Transdermal daily  sodium chloride 0.9%. 1000 milliLiter(s) (75 mL/Hr) IV Continuous <Continuous>  tiotropium 2.5 MICROgram(s) Inhaler 2 Puff(s) Inhalation daily  vancomycin  IVPB 1250 milliGRAM(s) IV Intermittent every 8 hours    MEDICATIONS  (PRN):  acetaminophen     Tablet .. 650 milliGRAM(s) Oral every 6 hours PRN Temp greater or equal to 38C (100.4F), Mild Pain (1 - 3)  albuterol/ipratropium for Nebulization 3 milliLiter(s) Nebulizer every 6 hours PRN Shortness of Breath  aluminum hydroxide/magnesium hydroxide/simethicone Suspension 30 milliLiter(s) Oral every 4 hours PRN Dyspepsia  hydrOXYzine hydrochloride 50 milliGRAM(s) Oral at bedtime PRN Anxiety  melatonin 3 milliGRAM(s) Oral at bedtime PRN Insomnia  nicotine  Polacrilex Lozenge 4 milliGRAM(s) Oral every 2 hours PRN craving/withdrawal  ondansetron Injectable 4 milliGRAM(s) IV Push every 8 hours PRN Nausea and/or Vomiting      Care Discussed with Consultants/Other Providers [x] YES  [ ] NO    Vital Signs Last 24 Hrs  T(C): 36.6 (16 Mar 2023 17:58), Max: 37.8 (16 Mar 2023 05:26)  T(F): 97.8 (16 Mar 2023 17:58), Max: 100 (16 Mar 2023 05:26)  HR: 94 (16 Mar 2023 17:58) (78 - 105)  BP: 152/89 (16 Mar 2023 17:58) (144/75 - 176/86)  BP(mean): --  RR: 17 (16 Mar 2023 17:58) (17 - 19)  SpO2: 93% (16 Mar 2023 17:58) (85% - 95%)    Parameters below as of 16 Mar 2023 17:58  Patient On (Oxygen Delivery Method): nasal cannula  O2 Flow (L/min): 4    I&O's Summary      PHYSICAL EXAM:  GENERAL: NAD, well-developed, comfortable  HEAD:  Atraumatic, Normocephalic  EYES: EOMI, PERRLA, conjunctiva and sclera clear  NECK: Supple, No JVD  CHEST/LUNG: mild decrease breath sounds bilaterally; No wheeze   HEART: Regular rate and rhythm; No murmurs, rubs, or gallops  ABDOMEN: Soft, Nontender, Nondistended; Bowel sounds present  NEURO: AAOx3, understands why he is admitted, able to move all four extremities spontaneously  Skin: left buttocks: erythematous, indurated, with shallow ulceration (non bleeding/draining) about 4-5 cm, indurated, no fluctuance noted. LLE calf: non healing bleeding wound about 2cm diameter, no superimposed infection
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
## 2023-03-16 NOTE — DIETITIAN INITIAL EVALUATION ADULT - PERTINENT LABORATORY DATA
03-15    129<L>  |  90<L>  |  11  ----------------------------<  112<H>  4.1   |  31  |  0.70    Ca    9.0      15 Mar 2023 12:18  Phos  3.8     03-16  Mg     2.00     03-16    TPro  6.2  /  Alb  3.4  /  TBili  0.2  /  DBili  x   /  AST  186<H>  /  ALT  96<H>  /  AlkPhos  163<H>  03-15  POCT Blood Glucose.: 273 mg/dL (03-15-23 @ 22:19)    A1C with Estimated Average Glucose Result: 6.3 % (02-05-23 @ 10:43)    CAPILLARY BLOOD GLUCOSE  POCT Blood Glucose.: 273 mg/dL (15 Mar 2023 22:19)

## 2023-03-16 NOTE — BH CONSULTATION LIAISON ASSESSMENT NOTE - OTHER PAST PSYCHIATRIC HISTORY (INCLUDE DETAILS REGARDING ONSET, COURSE OF ILLNESS, INPATIENT/OUTPATIENT TREATMENT)
reported hx of schizoaffective d/o, with no past SA/SIB, hx of multiple psych hospitalizations (last known in [facility] 10/2020 - 11/2020), with hx of outpatient psych services at [facility]/ Dr. Hollie Cook, last appt 12/15. reported hx of schizoaffective d/o, bipolar disorder with no past SA/SIB, hx of multiple psych hospitalizations (last known in [facility] 10/2020 - 11/2020), with hx of outpatient psych services at [facility]/ Dr. Hollie Cook

## 2023-03-16 NOTE — BH CONSULTATION LIAISON ASSESSMENT NOTE - NSBHCHARTREVIEWLAB_PSY_A_CORE FT
CBC Full  -  ( 13 Jan 2022 07:27 )  WBC Count : 6.09 K/uL  RBC Count : 5.17 M/uL  Hemoglobin : 14.1 g/dL  Hematocrit : 43.8 %  Platelet Count - Automated : 219 K/uL  Mean Cell Volume : 84.7 fL  Mean Cell Hemoglobin : 27.3 pg  Mean Cell Hemoglobin Concentration : 32.2 gm/dL  Auto Neutrophil # : x  Auto Lymphocyte # : x  Auto Monocyte # : x  Auto Eosinophil # : x  Auto Basophil # : x  Auto Neutrophil % : x  Auto Lymphocyte % : x  Auto Monocyte % : x  Auto Eosinophil % : x  Auto Basophil % : x  01-13    138  |  97<L>  |  11  ----------------------------<  98  4.0   |  27  |  0.70    Ca    9.3      13 Jan 2022 07:27  Phos  3.7     01-13  Mg     2.30     01-13    TPro  7.4  /  Alb  4.0  /  TBili  <0.2  /  DBili  x   /  AST  36  /  ALT  28  /  AlkPhos  156<H>  01-11                         13.0   9.58  )-----------( 211      ( 16 Mar 2023 07:05 )             40.7   03-15    129<L>  |  90<L>  |  11  ----------------------------<  112<H>  4.1   |  31  |  0.70    Ca    9.0      15 Mar 2023 12:18  Phos  3.8     03-16  Mg     2.00     03-16    TPro  6.2  /  Alb  3.4  /  TBili  0.2  /  DBili  x   /  AST  186<H>  /  ALT  96<H>  /  AlkPhos  163<H>  03-15

## 2023-03-16 NOTE — BH CONSULTATION LIAISON ASSESSMENT NOTE - NSBHCHARTREVIEWVS_PSY_A_CORE FT
Vital Signs Last 24 Hrs  T(C): 36.6 (16 Mar 2023 07:46), Max: 37.8 (16 Mar 2023 05:26)  T(F): 97.8 (16 Mar 2023 07:46), Max: 100 (16 Mar 2023 05:26)  HR: 78 (16 Mar 2023 07:46) (74 - 105)  BP: 150/74 (16 Mar 2023 07:46) (139/78 - 176/86)  BP(mean): --  RR: 18 (16 Mar 2023 07:46) (16 - 19)  SpO2: 88% (16 Mar 2023 07:46) (85% - 100%)    Parameters below as of 16 Mar 2023 07:46  Patient On (Oxygen Delivery Method): nasal cannula  O2 Flow (L/min): 2

## 2023-03-16 NOTE — CONSULT NOTE ADULT - ASSESSMENT
57 year old with schizophrenia presents with a change in behavior.  Noted to have a left gluteal wound.    Plan:   - no clinical evidence of fistula  - will perform bedside debridement of the left gluteal wound today  - Patient consented and patient's sister made aware and she is agreeable to plan for debridement today  - Discussed with Dr. Viktoria MARKS Team Surgery  [id number]

## 2023-03-16 NOTE — DIETITIAN INITIAL EVALUATION ADULT - PERTINENT MEDS FT
MEDICATIONS  (STANDING):  albuterol    90 MICROgram(s) HFA Inhaler 2 Puff(s) Inhalation every 6 hours  cefepime   IVPB 2000 milliGRAM(s) IV Intermittent every 12 hours  cholecalciferol 2000 Unit(s) Oral daily  clobetasol 0.05% Ointment 1 Application(s) Topical two times a day  diltiazem    milliGRAM(s) Oral daily  enoxaparin Injectable 40 milliGRAM(s) SubCutaneous every 24 hours  losartan 100 milliGRAM(s) Oral daily  nicotine - 21 mG/24Hr(s) Patch 1 Patch Transdermal daily  tiotropium 2.5 MICROgram(s) Inhaler 2 Puff(s) Inhalation daily  vancomycin  IVPB 1250 milliGRAM(s) IV Intermittent every 8 hours    MEDICATIONS  (PRN):  acetaminophen     Tablet .. 650 milliGRAM(s) Oral every 6 hours PRN Temp greater or equal to 38C (100.4F), Mild Pain (1 - 3)  albuterol/ipratropium for Nebulization 3 milliLiter(s) Nebulizer every 6 hours PRN Shortness of Breath  aluminum hydroxide/magnesium hydroxide/simethicone Suspension 30 milliLiter(s) Oral every 4 hours PRN Dyspepsia  hydrOXYzine hydrochloride 50 milliGRAM(s) Oral at bedtime PRN Anxiety  melatonin 3 milliGRAM(s) Oral at bedtime PRN Insomnia  nicotine  Polacrilex Lozenge 4 milliGRAM(s) Oral every 2 hours PRN craving/withdrawal  ondansetron Injectable 4 milliGRAM(s) IV Push every 8 hours PRN Nausea and/or Vomiting

## 2023-03-16 NOTE — PROGRESS NOTE ADULT - ASSESSMENT
56 y/o M with hx of multiple recent admissions of LLE cellulitis, hypogammaglobulinemia (on monthly IVIG), schizoaffective d/o (on haldol and Paxil) who was brought in by sister for bizarre behavior/delusional thinking, found to have significant cellulitis of the left buttocks with ulceration. no signs of nec fascitis at this time and no evidence of drainable abscess.
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
## 2023-03-16 NOTE — DIETITIAN INITIAL EVALUATION ADULT - ADD RECOMMEND
1) Monitor weights, PO intake/diet tolerance, skin integrity, pertinent labs.   2) Please consistently document % PO intake in nursing flowsheets to assess adequacy of nutritional intake/monitor need for further nutritional intervention(s).   3) Obtain and record current weight.  4) F/u diet education as appropriate.  5) Consider oral supplement i.e. ProSource No Carb 1 packet daily, currently pending wound care c/s for noted lt buttock cellulitis. 1) Monitor weights, PO intake/diet tolerance, skin integrity, pertinent labs.   2) Please consistently document % PO intake in nursing flowsheets to assess adequacy of nutritional intake/monitor need for further nutritional intervention(s).   3) Obtain and record current weight.  4) F/u diet education as appropriate/feasible.  5) Consider oral supplement i.e. ProSource No Carb 1 packet daily, currently pending wound care c/s for noted lt buttock cellulitis.

## 2023-03-16 NOTE — BH CONSULTATION LIAISON ASSESSMENT NOTE - SUMMARY
Garrett Cristina is a 56yoM domiciled in supportive housing, on disability, PMH HTN, DM, hypogammaglobulinemia, PPHx schizoaffective d/o, hx of multiple psych hospitalizations (last known in 2020 at [facility]), denies hx SA/NSSIB, denies drug or alcohol use, denies legal hx, who presented with L finger abscess, s/p I&D. Psychiatry consulted for medication management.        Plan:     56yoM domiciled in supportive housing TSI, SSD, single, PMH HTN, DM, hypogammaglobulinemia, PPHx schizoaffective d/o, bipolar d/o, hx of multiple psych hospitalizations (last known in [id number]), denies hx SA/NSSIB, denies drug or alcohol use, denies legal hx, who BIB sister for bizarre behavior, admitted to medicine for significant cellulitis of the left buttocks with ulceration. Psychiatry consulted for psychosis/schizophrenia.    On assessment, patient appears to be calm and cooperative. He denies any mood, psychotic and manic symptoms. He was alert and oriented x3. Patient appears to have some decline in ability to care for self but does not seem to be stemming from depressive symptoms. Patient could be exhibiting some negative psychotic symptoms however pt with multiple medical factors that could be contributing to poor hygiene (not showering, chaotic apartment). Per sister, patient has been less hygienic and unable to make it to the bathroom. Differential include AMS from hypoxia vs infectious vs electrolyte derangement vs negative symptoms from schizophrenia vs physical restrictions impairing patient ability to be independent. Will gather collateral from psychiatrist, Dr. Cook for further information.     Plan:  - Routine Obs, no SI  - HOLD paroxetine 60 mg until further information is gather from psychiatrist given hx of chronic hyponatremia   - HOLD next dose of Haldol dec 100 mg until confirmed when last dose was   - obtain TSH, T4, B12, folic acid       56yoM domiciled in supportive housing TSI, SSD, single, PMH HTN, DM, hypogammaglobulinemia, PPHx schizoaffective d/o, bipolar d/o, hx of multiple psych hospitalizations (last known in [id number] [facility]), denies hx SA/NSSIB, denies drug or alcohol use, denies legal hx, who BIB sister for bizarre behavior, admitted to medicine for significant cellulitis of the left buttocks with ulceration. Psychiatry consulted for psychosis/schizophrenia.    On assessment, patient appears to be calm and cooperative. He denies any mood, psychotic and manic symptoms. He was alert and oriented x3. Patient appears to have some decline in ability to care for self but does not seem to be stemming from depressive symptoms. Patient could be exhibiting some negative psychotic symptoms however pt with multiple medical factors that could be contributing to poor hygiene (not showering, chaotic apartment). Per sister, patient has been less hygienic and unable to make it to the bathroom. Differential include AMS from hypoxia vs infectious vs electrolyte derangement vs negative symptoms from schizophrenia vs physical restrictions impairing patient ability to be independent. Will gather collateral from psychiatrist, Dr. Cook for further information. Per Avatar, patient did received Haldol dec 150 mg on 2/15 at [facility] clinic. Please order EKG, if normal can administer dose tomorrow.     Plan:  - Routine Obs, no SI  - HOLD paroxetine 60 mg until further information is gather from psychiatrist given hx of chronic hyponatremia   - HOLD next dose of Haldol dec 150 mg until EKG is done and QTc <500.   - obtain TSH, T4, B12, folic acid       56yoM domiciled in supportive housing TSI, SSD, single, PMH HTN, DM, hypogammaglobulinemia, PPHx schizoaffective d/o, bipolar d/o, hx of multiple psych hospitalizations (last known in [id number] [facility]), denies hx SA/NSSIB, denies drug or alcohol use, denies legal hx, who BIB sister for bizarre behavior, admitted to medicine for significant cellulitis of the left buttocks with ulceration. Psychiatry consulted for psychosis/schizophrenia.    On assessment, patient appears to be calm and cooperative. He denies any mood, psychotic and manic symptoms. He was alert and oriented x3. Patient appears to have some decline in ability to care for self but does not seem to be stemming from depressive symptoms. Patient could be exhibiting some negative psychotic symptoms however pt with multiple medical factors that could be contributing to poor hygiene (not showering, chaotic apartment). Per sister, patient has been less hygienic and unable to make it to the bathroom. Differential include AMS from hypoxia vs infectious vs electrolyte derangement vs negative symptoms from schizophrenia vs physical restrictions impairing patient ability to be independent. Will gather collateral from psychiatrist, Dr. Cook for further information. Per Avatar, patient did received Haldol dec 150 mg on 2/15 at [facility] clinic. Please order EKG, CPK, if normal can administer dose tomorrow.     Plan:  - Routine Obs, no SI  - HOLD paroxetine 60 mg until further information is gather from psychiatrist given hx of chronic hyponatremia   - HOLD next dose of Haldol dec 150 mg until EKG is done and QTc <500. Also check CPK.   - obtain TSH, T4, B12, folic acid       56yoM domiciled in supportive housing TSI, SSD, single, PMH HTN, DM, hypogammaglobulinemia, PPHx schizoaffective d/o, bipolar d/o, hx of multiple psych hospitalizations (last known in [id number] [facility]), denies hx SA/NSSIB, denies drug or alcohol use, denies legal hx, who BIB sister for bizarre behavior, admitted to medicine for significant cellulitis of the left buttocks with ulceration. Psychiatry consulted for psychosis/schizophrenia.    On assessment, patient appears to be calm and cooperative. He denies any mood, psychotic and manic symptoms. He was alert and oriented x3. Patient appears to have some decline in ability to care for self but does not seem to be stemming from depressive symptoms. Patient could be exhibiting some negative psychotic symptoms however pt with multiple medical factors that could be contributing to poor hygiene (not showering, chaotic apartment). Per sister, patient has been less hygienic and unable to make it to the bathroom. Differential include AMS from hypoxia vs infectious vs electrolyte derangement vs negative symptoms from schizophrenia vs physical restrictions impairing patient ability to be independent. Will gather collateral from psychiatrist, Dr. Cook for further information. Per Avatar, patient did received Haldol dec 150 mg on 2/15 at [facility] clinic.    Plan:  - Routine Obs, no SI  - HOLD paroxetine 60 mg until further information is gather from psychiatrist given hx of chronic hyponatremia   - HOLD next dose of Haldol dec 150 mg until EKG is done and QTc <500. Also check CPK.   - obtain TSH, T4, B12, folic acid

## 2023-03-16 NOTE — DIETITIAN INITIAL EVALUATION ADULT - OTHER INFO
RD visited with patient for requested consult - Pressure Injury.    This is a 57M withf pmhx of HTN, Bipolar d/o (on monthly Haldol inj, next dose due on 2/15 as per patient), Hypogammaglobulinemia (on monthly IVIG inj, unclear on last dose), Asthma, Chronic Hyponatremia and ?DM2 (patient denies, not on medications for DM2) who pre presents to the hospital for worsening L leg swelling, found to have open non-purulent left leg wound  with findings concerning for L leg cellulitis.      Patient sleeping at time of visit, per RN, patient sleeps a lot, but it was reported that patient had good PO intake of meals so far today.  No GI distress reported i.e. nausea, vomiting, diarrhea. No chewing or swallowing difficulties communicated or noted.  Patient noted with cellulitis to Lt calf and Lt buttock. No pressure injuries currently noted, but per RN, pending wound care evaluation for further assessment of buttock.  Hyponatremia being monitored, noted to be chronic.    Per HIE 3/12/23 - 136.1kg. Pending current admission weight. RD visited with patient for requested consult - Pressure Injury.    This is a 57M withf pmhx of HTN, Bipolar d/o (on monthly Haldol), Hypogammaglobulinemia (on monthly IVIG inj, unclear on last dose), Asthma, Chronic Hyponatremia and ?DM2 (patient denies, not on medications for DM2) who presents to the hospital for worsening L leg swelling, found to have open non-purulent left leg wound  with findings concerning for L leg cellulitis.      Patient sleeping at time of visit, per RN, patient sleeps a lot, but it was reported that patient had good PO intake of meals so far today.  No GI distress reported i.e. nausea, vomiting, diarrhea. No chewing or swallowing difficulties communicated or noted.  Patient noted with cellulitis to Lt calf and Lt buttock. No pressure injuries currently noted, but per RN, pending wound care evaluation for further assessment of buttock.  Hyponatremia being monitored, noted to be chronic.    Per HIE 3/12/23 - 136.1kg. Pending current admission weight.
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
## 2023-03-16 NOTE — CONSULT NOTE ADULT - ASSESSMENT
57 year old with schizophrenia presents with a change in behavior.  Noted to have a left gluteal wound.    Plan:   - no clinical evidence of fistula, suggest pelvic MRI to further eval  - will need bedside debridement of the right perianal wound today  - f/u ID for abx  - suggest wound care consult  - car per primary team  - plan discussed with fellow Dr. Henrietta Muse on call on behalf of Dick Mckeon PGY-2  A team Surgery  [id number]

## 2023-03-16 NOTE — BH CONSULTATION LIAISON ASSESSMENT NOTE - NSBHATTESTCOMMENTATTENDFT_PSY_A_CORE
met with the patient. case discussed with resident md , impression and plan discussed and agreed upon

## 2023-03-17 ENCOUNTER — TRANSCRIPTION ENCOUNTER (OUTPATIENT)
Age: 58
End: 2023-03-17

## 2023-03-17 LAB
ALBUMIN SERPL ELPH-MCNC: 2.9 G/DL — LOW (ref 3.3–5)
ALBUMIN SERPL ELPH-MCNC: 2.9 G/DL — LOW (ref 3.3–5)
ALP SERPL-CCNC: 132 U/L — HIGH (ref 40–120)
ALP SERPL-CCNC: 146 U/L — HIGH (ref 40–120)
ALT FLD-CCNC: 61 U/L — HIGH (ref 4–41)
ALT FLD-CCNC: 69 U/L — HIGH (ref 4–41)
ANION GAP SERPL CALC-SCNC: 5 MMOL/L — LOW (ref 7–14)
ANION GAP SERPL CALC-SCNC: 9 MMOL/L — SIGNIFICANT CHANGE UP (ref 7–14)
APPEARANCE UR: CLEAR — SIGNIFICANT CHANGE UP
AST SERPL-CCNC: 55 U/L — HIGH (ref 4–40)
AST SERPL-CCNC: 80 U/L — HIGH (ref 4–40)
BASOPHILS # BLD AUTO: 0.03 K/UL — SIGNIFICANT CHANGE UP (ref 0–0.2)
BASOPHILS NFR BLD AUTO: 0.3 % — SIGNIFICANT CHANGE UP (ref 0–2)
BILIRUB SERPL-MCNC: 0.2 MG/DL — SIGNIFICANT CHANGE UP (ref 0.2–1.2)
BILIRUB SERPL-MCNC: <0.2 MG/DL — SIGNIFICANT CHANGE UP (ref 0.2–1.2)
BILIRUB UR-MCNC: NEGATIVE — SIGNIFICANT CHANGE UP
BUN SERPL-MCNC: 8 MG/DL — SIGNIFICANT CHANGE UP (ref 7–23)
BUN SERPL-MCNC: 9 MG/DL — SIGNIFICANT CHANGE UP (ref 7–23)
CALCIUM SERPL-MCNC: 8.8 MG/DL — SIGNIFICANT CHANGE UP (ref 8.4–10.5)
CALCIUM SERPL-MCNC: 8.9 MG/DL — SIGNIFICANT CHANGE UP (ref 8.4–10.5)
CHLORIDE SERPL-SCNC: 84 MMOL/L — LOW (ref 98–107)
CHLORIDE SERPL-SCNC: 86 MMOL/L — LOW (ref 98–107)
CHLORIDE UR-SCNC: <20 MMOL/L — SIGNIFICANT CHANGE UP
CK SERPL-CCNC: 257 U/L — HIGH (ref 30–200)
CO2 SERPL-SCNC: 29 MMOL/L — SIGNIFICANT CHANGE UP (ref 22–31)
CO2 SERPL-SCNC: 34 MMOL/L — HIGH (ref 22–31)
COLOR SPEC: COLORLESS — SIGNIFICANT CHANGE UP
CREAT ?TM UR-MCNC: 25 MG/DL — SIGNIFICANT CHANGE UP
CREAT SERPL-MCNC: 0.62 MG/DL — SIGNIFICANT CHANGE UP (ref 0.5–1.3)
CREAT SERPL-MCNC: 0.62 MG/DL — SIGNIFICANT CHANGE UP (ref 0.5–1.3)
DIFF PNL FLD: NEGATIVE — SIGNIFICANT CHANGE UP
EGFR: 113 ML/MIN/1.73M2 — SIGNIFICANT CHANGE UP
EGFR: 113 ML/MIN/1.73M2 — SIGNIFICANT CHANGE UP
EOSINOPHIL # BLD AUTO: 0.23 K/UL — SIGNIFICANT CHANGE UP (ref 0–0.5)
EOSINOPHIL NFR BLD AUTO: 2.1 % — SIGNIFICANT CHANGE UP (ref 0–6)
FOLATE RBC-MCNC: 1101 NG/ML — SIGNIFICANT CHANGE UP (ref 499–1504)
GLUCOSE SERPL-MCNC: 112 MG/DL — HIGH (ref 70–99)
GLUCOSE SERPL-MCNC: 93 MG/DL — SIGNIFICANT CHANGE UP (ref 70–99)
GLUCOSE UR QL: NEGATIVE — SIGNIFICANT CHANGE UP
HAV IGM SER-ACNC: SIGNIFICANT CHANGE UP
HBV CORE IGM SER-ACNC: SIGNIFICANT CHANGE UP
HBV SURFACE AG SER-ACNC: SIGNIFICANT CHANGE UP
HCT VFR BLD CALC: 39 % — SIGNIFICANT CHANGE UP (ref 39–50)
HCT VFR BLD CALC: 39.8 % — SIGNIFICANT CHANGE UP (ref 39–50)
HCV AB S/CO SERPL IA: 0.08 S/CO — SIGNIFICANT CHANGE UP (ref 0–0.99)
HCV AB SERPL-IMP: SIGNIFICANT CHANGE UP
HGB BLD-MCNC: 12.3 G/DL — LOW (ref 13–17)
IANC: 8.75 K/UL — HIGH (ref 1.8–7.4)
IGA FLD-MCNC: <10 MG/DL — LOW (ref 70–400)
IGA FLD-MCNC: <2 MG/DL — LOW (ref 84–499)
IGD SER-MCNC: <1 MG/DL — SIGNIFICANT CHANGE UP
IGG FLD-MCNC: 692 MG/DL — SIGNIFICANT CHANGE UP (ref 610–1660)
IGM SERPL-MCNC: 17 MG/DL — LOW (ref 40–230)
IGM SERPL-MCNC: 30 MG/DL — LOW (ref 35–242)
IMM GRANULOCYTES NFR BLD AUTO: 2.3 % — HIGH (ref 0–0.9)
KAPPA LC SER QL IFE: 0.08 MG/DL — LOW (ref 0.33–1.94)
KAPPA/LAMBDA FREE LIGHT CHAIN RATIO, SERUM: SIGNIFICANT CHANGE UP (ref 0.26–1.65)
KETONES UR-MCNC: NEGATIVE — SIGNIFICANT CHANGE UP
LAMBDA LC SER QL IFE: <0.15 MG/DL — LOW (ref 0.57–2.63)
LEUKOCYTE ESTERASE UR-ACNC: NEGATIVE — SIGNIFICANT CHANGE UP
LYMPHOCYTES # BLD AUTO: 1.15 K/UL — SIGNIFICANT CHANGE UP (ref 1–3.3)
LYMPHOCYTES # BLD AUTO: 10.4 % — LOW (ref 13–44)
MAGNESIUM SERPL-MCNC: 1.8 MG/DL — SIGNIFICANT CHANGE UP (ref 1.6–2.6)
MCHC RBC-ENTMCNC: 24.9 PG — LOW (ref 27–34)
MCHC RBC-ENTMCNC: 31.5 GM/DL — LOW (ref 32–36)
MCV RBC AUTO: 79.1 FL — LOW (ref 80–100)
MONOCYTES # BLD AUTO: 0.68 K/UL — SIGNIFICANT CHANGE UP (ref 0–0.9)
MONOCYTES NFR BLD AUTO: 6.1 % — SIGNIFICANT CHANGE UP (ref 2–14)
NEUTROPHILS # BLD AUTO: 8.75 K/UL — HIGH (ref 1.8–7.4)
NEUTROPHILS NFR BLD AUTO: 78.8 % — HIGH (ref 43–77)
NITRITE UR-MCNC: NEGATIVE — SIGNIFICANT CHANGE UP
NRBC # BLD: 0 /100 WBCS — SIGNIFICANT CHANGE UP (ref 0–0)
NRBC # FLD: 0 K/UL — SIGNIFICANT CHANGE UP (ref 0–0)
OSMOLALITY SERPL: 262 MOSM/KG — LOW (ref 275–295)
OSMOLALITY UR: 144 MOSM/KG — SIGNIFICANT CHANGE UP (ref 50–1200)
PH UR: 7.5 — SIGNIFICANT CHANGE UP (ref 5–8)
PHOSPHATE SERPL-MCNC: 2.8 MG/DL — SIGNIFICANT CHANGE UP (ref 2.5–4.5)
PLATELET # BLD AUTO: 176 K/UL — SIGNIFICANT CHANGE UP (ref 150–400)
POTASSIUM SERPL-MCNC: 4.8 MMOL/L — SIGNIFICANT CHANGE UP (ref 3.5–5.3)
POTASSIUM SERPL-MCNC: 5.6 MMOL/L — HIGH (ref 3.5–5.3)
POTASSIUM SERPL-SCNC: 4.8 MMOL/L — SIGNIFICANT CHANGE UP (ref 3.5–5.3)
POTASSIUM SERPL-SCNC: 5.6 MMOL/L — HIGH (ref 3.5–5.3)
POTASSIUM UR-SCNC: 9.5 MMOL/L — SIGNIFICANT CHANGE UP
PROT SERPL-MCNC: 5.8 G/DL — LOW (ref 6–8.3)
PROT SERPL-MCNC: 5.8 G/DL — LOW (ref 6–8.3)
PROT UR-MCNC: ABNORMAL
RBC # BLD: 4.93 M/UL — SIGNIFICANT CHANGE UP (ref 4.2–5.8)
RBC # FLD: 15.4 % — HIGH (ref 10.3–14.5)
SODIUM SERPL-SCNC: 123 MMOL/L — LOW (ref 135–145)
SODIUM SERPL-SCNC: 124 MMOL/L — LOW (ref 135–145)
SODIUM UR-SCNC: <20 MMOL/L — SIGNIFICANT CHANGE UP
SP GR SPEC: 1.01 — LOW (ref 1.01–1.05)
T4 AB SER-ACNC: 6.52 UG/DL — SIGNIFICANT CHANGE UP (ref 5.1–13)
UROBILINOGEN FLD QL: SIGNIFICANT CHANGE UP
WBC # BLD: 11.1 K/UL — HIGH (ref 3.8–10.5)
WBC # FLD AUTO: 11.1 K/UL — HIGH (ref 3.8–10.5)

## 2023-03-17 PROCEDURE — 99223 1ST HOSP IP/OBS HIGH 75: CPT

## 2023-03-17 PROCEDURE — 99231 SBSQ HOSP IP/OBS SF/LOW 25: CPT

## 2023-03-17 PROCEDURE — 99232 SBSQ HOSP IP/OBS MODERATE 35: CPT

## 2023-03-17 RX ORDER — HALOPERIDOL DECANOATE 100 MG/ML
150 INJECTION INTRAMUSCULAR ONCE
Refills: 0 | Status: COMPLETED | OUTPATIENT
Start: 2023-03-17 | End: 2023-03-17

## 2023-03-17 RX ORDER — SODIUM ZIRCONIUM CYCLOSILICATE 10 G/10G
5 POWDER, FOR SUSPENSION ORAL ONCE
Refills: 0 | Status: COMPLETED | OUTPATIENT
Start: 2023-03-17 | End: 2023-03-17

## 2023-03-17 RX ADMIN — SODIUM ZIRCONIUM CYCLOSILICATE 5 GRAM(S): 10 POWDER, FOR SUSPENSION ORAL at 06:42

## 2023-03-17 RX ADMIN — Medication 650 MILLIGRAM(S): at 14:20

## 2023-03-17 RX ADMIN — ALBUTEROL 2 PUFF(S): 90 AEROSOL, METERED ORAL at 06:43

## 2023-03-17 RX ADMIN — TIOTROPIUM BROMIDE 2 PUFF(S): 18 CAPSULE ORAL; RESPIRATORY (INHALATION) at 13:16

## 2023-03-17 RX ADMIN — Medication 1 APPLICATION(S): at 18:35

## 2023-03-17 RX ADMIN — Medication 1 APPLICATION(S): at 06:41

## 2023-03-17 RX ADMIN — Medication 1 PATCH: at 06:47

## 2023-03-17 RX ADMIN — Medication 300 MILLIGRAM(S): at 06:44

## 2023-03-17 RX ADMIN — ALBUTEROL 2 PUFF(S): 90 AEROSOL, METERED ORAL at 01:01

## 2023-03-17 RX ADMIN — CEFEPIME 100 MILLIGRAM(S): 1 INJECTION, POWDER, FOR SOLUTION INTRAMUSCULAR; INTRAVENOUS at 02:47

## 2023-03-17 RX ADMIN — Medication 1 PATCH: at 13:10

## 2023-03-17 RX ADMIN — CEFEPIME 100 MILLIGRAM(S): 1 INJECTION, POWDER, FOR SOLUTION INTRAMUSCULAR; INTRAVENOUS at 13:25

## 2023-03-17 RX ADMIN — ALBUTEROL 2 PUFF(S): 90 AEROSOL, METERED ORAL at 13:16

## 2023-03-17 RX ADMIN — Medication 2000 UNIT(S): at 13:25

## 2023-03-17 RX ADMIN — Medication 166.67 MILLIGRAM(S): at 07:33

## 2023-03-17 RX ADMIN — Medication 166.67 MILLIGRAM(S): at 18:33

## 2023-03-17 RX ADMIN — Medication 500 MILLIGRAM(S): at 07:08

## 2023-03-17 RX ADMIN — Medication 150 MILLIGRAM(S): at 01:01

## 2023-03-17 RX ADMIN — Medication 1 PATCH: at 19:17

## 2023-03-17 RX ADMIN — Medication 500 MILLIGRAM(S): at 18:34

## 2023-03-17 RX ADMIN — Medication 650 MILLIGRAM(S): at 13:24

## 2023-03-17 RX ADMIN — LOSARTAN POTASSIUM 100 MILLIGRAM(S): 100 TABLET, FILM COATED ORAL at 06:44

## 2023-03-17 RX ADMIN — Medication 1 PATCH: at 13:16

## 2023-03-17 RX ADMIN — HALOPERIDOL DECANOATE 150 MILLIGRAM(S): 100 INJECTION INTRAMUSCULAR at 18:32

## 2023-03-17 RX ADMIN — ALBUTEROL 2 PUFF(S): 90 AEROSOL, METERED ORAL at 18:34

## 2023-03-17 RX ADMIN — ENOXAPARIN SODIUM 40 MILLIGRAM(S): 100 INJECTION SUBCUTANEOUS at 18:34

## 2023-03-17 NOTE — DISCHARGE NOTE PROVIDER - NSDCCPCAREPLAN_GEN_ALL_CORE_FT
PRINCIPAL DISCHARGE DIAGNOSIS  Diagnosis: Cellulitis of left buttock  Assessment and Plan of Treatment:       SECONDARY DISCHARGE DIAGNOSES  Diagnosis: HTN (hypertension)  Assessment and Plan of Treatment:     Diagnosis: Chronic hyponatremia  Assessment and Plan of Treatment:     Diagnosis: Schizoaffective disorder  Assessment and Plan of Treatment:     Diagnosis: CVID (common variable immunodeficiency)  Assessment and Plan of Treatment:     Diagnosis: Elevated LFTs  Assessment and Plan of Treatment:     Diagnosis: Current smoker  Assessment and Plan of Treatment:      PRINCIPAL DISCHARGE DIAGNOSIS  Diagnosis: Cellulitis of left buttock  Assessment and Plan of Treatment: Take all of your antibiotics as ordered.  Call your Health Care Provider within two days of arriving home to make a follow up appointment within one week.  If the affected cellulitic area increases in redness, warmth, pain or swelling call your Health Care Provider.  If you develop fever, chills, and/or malaise, call your Health Care Provider.        SECONDARY DISCHARGE DIAGNOSES  Diagnosis: HTN (hypertension)  Assessment and Plan of Treatment: Eat Low salt diet  Perform Activity as tolerated.  Take all medication as prescribed.  Follow up with your medical doctor for routine blood pressure monitoring at your next visit.  Notify your doctor if you have any of the following symptoms:   Dizziness, Lightheadedness, Blurry vision, Headache, Chest pain, Shortness of breath      Diagnosis: Chronic hyponatremia  Assessment and Plan of Treatment: Only take medicines as directed by your caregiver. Many medicines can make hyponatremia worse. Discuss all your medicines with your doctor  Carefully follow any recommended diet, including any fluid restrictions.  You may be asked to repeat lab tests. Follow these directions.  SEEK MEDICAL CARE IF:  You develop worsening nausea, fatigue, headache, confusion, or weakness.  Your original hyponatremia symptoms return.  You have problems following the recommended diet.   SEEK IMMEDIATE MEDICAL CARE IF:  You have a seizure.  You faint.  You have ongoing diarrhea or vomiting      Diagnosis: Schizoaffective disorder  Assessment and Plan of Treatment: Continue your medications as directed and follow up with your PCP and psychiatrist for further evaluation and medical management. If you are ever in need of immediate psychiatric assistance you may reach out to the [facility] [phone]      Diagnosis: CVID (common variable immunodeficiency)  Assessment and Plan of Treatment: Follow up with your rheunatiology doctor for IVIG infusions as directed, keep your appointments.    Diagnosis: Elevated LFTs  Assessment and Plan of Treatment: Follow up with your primary doctor to repeat your liver enzymes.    Diagnosis: Current smoker  Assessment and Plan of Treatment: You are encouraged to stop smoking cigarettes as this will increase the risk of chronic respiratory illness and re-admission to hospital. If you need assistance please refer to the information below:  [facility] smoking cessation hotline: (958) 687-1353  [email or web address]       PRINCIPAL DISCHARGE DIAGNOSIS  Diagnosis: Cellulitis of left buttock  Assessment and Plan of Treatment: You should continue taking Vancomycin via mid-line through 4/11/2023.  Call your Health Care Provider within two days of arriving home to make a follow up appointment within one week.  If the affected cellulitic area increases in redness, warmth, pain or swelling call your Health Care Provider.  If you develop fever, chills, and/or malaise, call your Health Care Provider.        SECONDARY DISCHARGE DIAGNOSES  Diagnosis: HTN (hypertension)  Assessment and Plan of Treatment: Eat Low salt diet  Perform Activity as tolerated.  Take all medication as prescribed.  Follow up with your medical doctor for routine blood pressure monitoring at your next visit.  Notify your doctor if you have any of the following symptoms:   Dizziness, Lightheadedness, Blurry vision, Headache, Chest pain, Shortness of breath      Diagnosis: Chronic hyponatremia  Assessment and Plan of Treatment: Only take medicines as directed by your caregiver. Many medicines can make hyponatremia worse. Discuss all your medicines with your doctor  Carefully follow any recommended diet, including any fluid restrictions.  You may be asked to repeat lab tests. Follow these directions.  SEEK MEDICAL CARE IF:  You develop worsening nausea, fatigue, headache, confusion, or weakness.  Your original hyponatremia symptoms return.  You have problems following the recommended diet.   SEEK IMMEDIATE MEDICAL CARE IF:  You have a seizure.  You faint.  You have ongoing diarrhea or vomiting      Diagnosis: Schizoaffective disorder  Assessment and Plan of Treatment: Continue your medications as directed and follow up with your PCP and psychiatrist for further evaluation and medical management. If you are ever in need of immediate psychiatric assistance you may reach out to the [facility] [phone]      Diagnosis: CVID (common variable immunodeficiency)  Assessment and Plan of Treatment: Follow up with your rheunatiology doctor for IVIG infusions as directed, keep your appointments.    Diagnosis: Elevated LFTs  Assessment and Plan of Treatment: Follow up with your primary doctor to repeat your liver enzymes.    Diagnosis: Current smoker  Assessment and Plan of Treatment: You are encouraged to stop smoking cigarettes as this will increase the risk of chronic respiratory illness and re-admission to hospital. If you need assistance please refer to the information below:  [facility] smoking cessation hotline: (971) 510-9827  [email or web address]

## 2023-03-17 NOTE — PROGRESS NOTE ADULT - ASSESSMENT
57 year old with schizophrenia presents with a change in behavior.  Noted to have a left gluteal wound.    Plan:   - no clinical evidence of fistula  - dressing changes  - rest of care per primary  -c/w abx    B Team Surgery  [id number]

## 2023-03-17 NOTE — DISCHARGE NOTE PROVIDER - DISCHARGE DIET
Regular Diet - No restrictions/Other Diet Instructions Regular Diet - No restrictions/Consistent Carbohydrate Diabetic Diets/Other Diet Instructions

## 2023-03-17 NOTE — PROGRESS NOTE ADULT - SUBJECTIVE AND OBJECTIVE BOX
SUBJECTIVE/ OVERNIGHT EVENTS:  awake alert  stable overall.  awake alert. comfortable.  denied pain.  no n/v/d.   tolerating diet.       --------------------------------------------------------------------------------------------  LABS:                        12.3   11.10 )-----------( 176      ( 17 Mar 2023 09:37 )             39.0     03-17    123<L>  |  84<L>  |  8   ----------------------------<  93  4.8   |  34<H>  |  0.62    Ca    8.8      17 Mar 2023 06:30  Phos  2.8     -  Mg     1.80         TPro  5.8<L>  /  Alb  2.9<L>  /  TBili  0.2  /  DBili  x   /  AST  55<H>  /  ALT  61<H>  /  AlkPhos  132<H>        CAPILLARY BLOOD GLUCOSE      POCT Blood Glucose.: 97 mg/dL (17 Mar 2023 22:24)  POCT Blood Glucose.: 95 mg/dL (17 Mar 2023 17:23)  POCT Blood Glucose.: 96 mg/dL (17 Mar 2023 12:03)  POCT Blood Glucose.: 152 mg/dL (17 Mar 2023 07:35)    CARDIAC MARKERS ( 17 Mar 2023 06:30 )  x     / x     / 257 U/L / x     / x          Urinalysis Basic - ( 17 Mar 2023 17:45 )    Color: Colorless / Appearance: Clear / S.007 / pH: x  Gluc: x / Ketone: Negative  / Bili: Negative / Urobili: <2 mg/dL   Blood: x / Protein: Trace / Nitrite: Negative   Leuk Esterase: Negative / RBC: x / WBC x   Sq Epi: x / Non Sq Epi: x / Bacteria: x        RADIOLOGY & ADDITIONAL TESTS:    Imaging Personally Reviewed:  [x] YES  [ ] NO    Consultant(s) Notes Reviewed:  [x] YES  [ ] NO    MEDICATIONS  (STANDING):  albuterol    90 MICROgram(s) HFA Inhaler 2 Puff(s) Inhalation every 6 hours  cefepime   IVPB 2000 milliGRAM(s) IV Intermittent every 12 hours  cholecalciferol 2000 Unit(s) Oral daily  clobetasol 0.05% Ointment 1 Application(s) Topical two times a day  diltiazem    milliGRAM(s) Oral daily  enoxaparin Injectable 40 milliGRAM(s) SubCutaneous every 24 hours  lidocaine 1% Injectable 20 milliLiter(s) Local Injection once  losartan 100 milliGRAM(s) Oral daily  metroNIDAZOLE    Tablet 500 milliGRAM(s) Oral two times a day  nicotine - 21 mG/24Hr(s) Patch 1 Patch Transdermal daily  tiotropium 2.5 MICROgram(s) Inhaler 2 Puff(s) Inhalation daily  vancomycin  IVPB 1250 milliGRAM(s) IV Intermittent every 8 hours    MEDICATIONS  (PRN):  acetaminophen     Tablet .. 650 milliGRAM(s) Oral every 6 hours PRN Temp greater or equal to 38C (100.4F), Mild Pain (1 - 3)  albuterol/ipratropium for Nebulization 3 milliLiter(s) Nebulizer every 6 hours PRN Shortness of Breath  aluminum hydroxide/magnesium hydroxide/simethicone Suspension 30 milliLiter(s) Oral every 4 hours PRN Dyspepsia  hydrOXYzine hydrochloride 50 milliGRAM(s) Oral at bedtime PRN Anxiety  melatonin 3 milliGRAM(s) Oral at bedtime PRN Insomnia  nicotine  Polacrilex Lozenge 4 milliGRAM(s) Oral every 2 hours PRN craving/withdrawal  ondansetron Injectable 4 milliGRAM(s) IV Push every 8 hours PRN Nausea and/or Vomiting      Care Discussed with Consultants/Other Providers [x] YES  [ ] NO    Vital Signs Last 24 Hrs  T(C): 36.6 (17 Mar 2023 19:08), Max: 36.9 (17 Mar 2023 10:31)  T(F): 97.9 (17 Mar 2023 19:08), Max: 98.4 (17 Mar 2023 10:31)  HR: 80 (17 Mar 2023 19:08) (75 - 93)  BP: 147/75 (17 Mar 2023 19:08) (147/75 - 168/83)  BP(mean): --  RR: 18 (17 Mar 2023 19:08) (18 - 18)  SpO2: 95% (17 Mar 2023 19:08) (92% - 95%)    Parameters below as of 17 Mar 2023 19:08  Patient On (Oxygen Delivery Method): nasal cannula  O2 Flow (L/min): 4    I&O's Summary    17 Mar 2023 07:01  -  17 Mar 2023 22:45  --------------------------------------------------------  IN: 0 mL / OUT: 700 mL / NET: -700 mL        PHYSICAL EXAM:  GENERAL: NAD, well-developed, comfortable  HEAD:  Atraumatic, Normocephalic  EYES: EOMI, PERRLA, conjunctiva and sclera clear  NECK: Supple, No JVD  CHEST/LUNG: mild decrease breath sounds bilaterally; No wheeze   HEART: Regular rate and rhythm; No murmurs, rubs, or gallops  ABDOMEN: Soft, Nontender, Nondistended; Bowel sounds present  NEURO: AAOx3, understands why he is admitted, able to move all four extremities spontaneously  Skin: left buttocks: erythematous, indurated, with shallow ulceration (non bleeding/draining) about 4-5 cm, indurated, no fluctuance noted. LLE calf: non healing bleeding wound about 2cm diameter, no superimposed infection

## 2023-03-17 NOTE — CONSULT NOTE ADULT - SUBJECTIVE AND OBJECTIVE BOX
[facility] DIVISION OF KIDNEY DISEASES AND HYPERTENSION -- INITIAL CONSULT NOTE  --------------------------------------------------------------------------------  HPI:56 y/o M with hx of multiple recent admissions of LLE cellulitis, hypogammaglobulinemia (on monthly IVIG), schizoaffective d/o (on haldol and Paxil) who was brought in by sister for bizarre behavior/delusional thinking, found to have significant cellulitis of the left buttocks with ulceration. Recently he left AMA from a hospital. Sister then brought him in to [facility] 3/15/23. Patient was found to have hyponatremia upon admission with a Na of 129 which has been trendning down. Nephrology consulted for eval of hyponatremia. Of note patient has previously been seen by renal team when admitted in the past. Know to drink a lot of fluids and was treated with fluid restriction for polydipsia.     Currently pt states that he is feeling well. Does not drink as much as he did before. Eats well.       PAST HISTORY  --------------------------------------------------------------------------------  PAST MEDICAL & SURGICAL HISTORY:  Smoker      DM (diabetes mellitus)      HTN (hypertension)      Abscess of finger      Bipolar disorder      Chronic hyponatremia      CVID (common variable immunodeficiency)      Deviated septum      Loss of teeth due to extraction  All teeth due to dental carries        FAMILY HISTORY:  Family history of throat cancer (Father)    Family history of leukemia (Mother)      PAST SOCIAL HISTORY: No smoking, drugs or alcohol use.     ALLERGIES & MEDICATIONS  --------------------------------------------------------------------------------  Allergies    amoxicillin (Fever)  penicillin (Rash)    Intolerances      Standing Inpatient Medications  albuterol    90 MICROgram(s) HFA Inhaler 2 Puff(s) Inhalation every 6 hours  cefepime   IVPB 2000 milliGRAM(s) IV Intermittent every 12 hours  cholecalciferol 2000 Unit(s) Oral daily  clobetasol 0.05% Ointment 1 Application(s) Topical two times a day  diltiazem    milliGRAM(s) Oral daily  enoxaparin Injectable 40 milliGRAM(s) SubCutaneous every 24 hours  haloperidol decanoate Injectable, Long Acting 150 milliGRAM(s) IntraMuscular once  lidocaine 1% Injectable 20 milliLiter(s) Local Injection once  losartan 100 milliGRAM(s) Oral daily  metroNIDAZOLE    Tablet 500 milliGRAM(s) Oral two times a day  nicotine - 21 mG/24Hr(s) Patch 1 Patch Transdermal daily  tiotropium 2.5 MICROgram(s) Inhaler 2 Puff(s) Inhalation daily  vancomycin  IVPB 1250 milliGRAM(s) IV Intermittent every 8 hours    PRN Inpatient Medications  acetaminophen     Tablet .. 650 milliGRAM(s) Oral every 6 hours PRN  albuterol/ipratropium for Nebulization 3 milliLiter(s) Nebulizer every 6 hours PRN  aluminum hydroxide/magnesium hydroxide/simethicone Suspension 30 milliLiter(s) Oral every 4 hours PRN  hydrOXYzine hydrochloride 50 milliGRAM(s) Oral at bedtime PRN  melatonin 3 milliGRAM(s) Oral at bedtime PRN  nicotine  Polacrilex Lozenge 4 milliGRAM(s) Oral every 2 hours PRN  ondansetron Injectable 4 milliGRAM(s) IV Push every 8 hours PRN      REVIEW OF SYSTEMS  --------------------------------------------------------------------------------  Gen: No weight changes, fatigue, fevers/chills, weakness  Skin: No rashes  Head/Eyes/Ears/Mouth: No headache; Normal hearing; Normal vision   Respiratory: No dyspnea, cough, wheezing, hemoptysis  CV: No chest pain, PND, orthopnea  GI: No abdominal pain, diarrhea, constipation, nausea, vomiting, melena, hematochezia  : No increased frequency, dysuria, hematuria, nocturia  MSK: No joint pain/swelling; no back pain; no edema  Neuro: No dizziness/lightheadedness, weakness, seizures, numbness, tingling  Heme: No easy bruising or bleeding  Endo: No heat/cold intolerance  Psych: No significant nervousness, anxiety, stress, depression    All other systems were reviewed and are negative, except as noted.    VITALS/PHYSICAL EXAM  --------------------------------------------------------------------------------  T(C): 36.9 (03-17-23 @ 10:31), Max: 36.9 (03-17-23 @ 10:31)  HR: 75 (03-17-23 @ 10:31) (75 - 94)  BP: 168/83 (03-17-23 @ 10:31) (152/89 - 168/83)  RR: 18 (03-17-23 @ 10:31) (17 - 18)  SpO2: 94% (03-17-23 @ 10:31) (92% - 94%)  Wt(kg): --    Weight (kg): 135.2 (03-16-23 @ 17:58)      Physical Exam:  	Gen: NAD, well-appearing  	HEENT: PERRL, supple neck, clear oropharynx  	Pulm: CTA B/L  	CV: RRR, S1S2; no rub  	Back: No spinal or CVA tenderness; no sacral edema  	Abd: +BS, soft, nontender/nondistended  	UE: Warm, FROM, no clubbing, intact strength; no edema; no asterixis  	LE: Warm, FROM, no clubbing, intact strength; no edema  	Neuro: No focal deficits, nl gait  	Psych: Normal affect and mood  	Skin: Warm, without rashes      LABS/STUDIES  --------------------------------------------------------------------------------              12.3   11.10 >-----------<  176      [03-17-23 @ 09:37]              39.0     123  |  84  |  8   ----------------------------<  93      [03-17-23 @ 06:30]  4.8   |  34  |  0.62        Ca     8.8     [03-17-23 @ 06:30]      Mg     1.80     [03-17-23 @ 06:30]      Phos  2.8     [03-17-23 @ 06:30]    TPro  5.8  /  Alb  2.9  /  TBili  0.2  /  DBili  x   /  AST  55  /  ALT  61  /  AlkPhos  132  [03-17-23 @ 06:30]              [03-17-23 @ 06:30]    Creatinine Trend:  SCr 0.62 [03-17 @ 06:30]  SCr 0.62 [03-16 @ 23:27]  SCr 0.70 [03-15 @ 12:18]    Urinalysis - [03-15-23 @ 12:26]      Color Colorless / Appearance Clear / SG 1.005 / pH 7.0      Gluc Negative / Ketone Negative  / Bili Negative / Urobili <2 mg/dL       Blood Negative / Protein Trace / Leuk Est Negative / Nitrite Negative      RBC  / WBC  / Hyaline  / Gran  / Sq Epi  / Non Sq Epi  / Bacteria     Urine Creatinine 13      [03-15-23 @ 12:54]  Urine Sodium <20      [03-15-23 @ 12:54]  Urine Potassium 4.6      [03-15-23 @ 12:54]  Urine Chloride <20      [03-15-23 @ 12:54]  Urine Osmolality 94      [03-15-23 @ 12:54]    HbA1c 5.4      [08-09-18 @ 10:55]  TSH 1.97      [02-02-23 @ 10:14]    HBsAg Nonreact      [03-17-23 @ 06:30]  HCV 0.08, Nonreact      [03-17-23 @ 06:30]    Free Light Chains: kappa 0.08, lambda <0.15, ratio = See Note      [03-17 @ 06:30]

## 2023-03-17 NOTE — CONSULT NOTE ADULT - PROBLEM SELECTOR RECOMMENDATION 9
Patient with hyponatremia. Na initially 129 then 124 and now 123 today. Please repeat urine and serum osm, urine lytes. Monitor serum sodium. Fluid restrict to < 1L. I would not give salt tabs as patient has improved with fluid restriction in the past. Will assess the needs for them in AM.    Please page fellow with any questions    Shankar Emery DO  Nephrology Fellow  [facility] Pager [phone]

## 2023-03-17 NOTE — BH CONSULTATION LIAISON PROGRESS NOTE - NSBHASSESSMENTFT_PSY_ALL_CORE
56yoM domiciled in supportive housing TSI, SSD, single, PMH HTN, DM, hypogammaglobulinemia, PPHx schizoaffective d/o, bipolar d/o, hx of multiple psych hospitalizations (last known in [id number]), denies hx SA/NSSIB, denies drug or alcohol use, denies legal hx, who BIB sister for bizarre behavior, admitted to medicine for significant cellulitis of the left buttocks with ulceration. Psychiatry consulted for psychosis/schizophrenia.    On assessment, patient appears to be calm and cooperative. He denies any mood, psychotic and manic symptoms. He was alert and oriented x3. Patient appears to have some decline in ability to care for self but does not seem to be stemming from depressive symptoms. Patient could be exhibiting some negative psychotic symptoms however pt with multiple medical factors that could be contributing to poor hygiene (not showering, chaotic apartment). Per sister, patient has been less hygienic and unable to make it to the bathroom. Differential include AMS from hypoxia vs infectious vs electrolyte derangement vs negative symptoms from schizophrenia vs physical restrictions impairing patient ability to be independent. Will gather collateral from psychiatrist, Dr. Cook for further information. Per Avatar, patient did received Haldol dec 150 mg on 2/15 at [facility] clinic.  3/17: Patient is well, no note able signs of psychosis and denying all mood, psychotic or manic symptoms. Patient appears to have some concrete thought process but otherwise linear and rational. No concern for his safety. Recommend ordering Haldol dec 150 mg IM giving pt has qtc <500 and is due for this month. Discussed with team and concern for Paxil contributing to hyponatremia given past hospitalization with evidence for SIADH. Patient informed about Paxil being discontinued and he is agreeable.     Plan:  - Routine Obs, no SI  - D/c paroxetine 60 mg until further information is gather from psychiatrist given hx of chronic hyponatremia   - ORDER Haldol dec 150 mg, QTc <500  - ORDER TSH, T4, B12, folic acid    56yoM domiciled in supportive housing TSI, SSD, single, PMH HTN, DM, hypogammaglobulinemia, PPHx schizoaffective d/o, bipolar d/o, hx of multiple psych hospitalizations (last known in [id number]), denies hx SA/NSSIB, denies drug or alcohol use, denies legal hx, who BIB sister for bizarre behavior, admitted to medicine for significant cellulitis of the left buttocks with ulceration. Psychiatry consulted for psychosis/schizophrenia.    On assessment, patient appears to be calm and cooperative. He denies any mood, psychotic and manic symptoms. He was alert and oriented x3. Patient appears to have some decline in ability to care for self but does not seem to be stemming from depressive symptoms. Patient could be exhibiting some negative psychotic symptoms however pt with multiple medical factors that could be contributing to poor hygiene (not showering, chaotic apartment). Per sister, patient has been less hygienic and unable to make it to the bathroom. Differential include AMS from hypoxia vs infectious vs electrolyte derangement vs negative symptoms from schizophrenia vs physical restrictions impairing patient ability to be independent. Will gather collateral from psychiatrist, Dr. Cook for further information. Per Avatar, patient did received Haldol dec 150 mg on 2/15 at [facility] clinic.    3/17: Patient is well, no note able signs of psychosis and denying all mood, psychotic or manic symptoms. Patient appears to have some concrete thought process but otherwise linear and rational. No concern for his safety. Recommend ordering Haldol dec 150 mg IM giving pt has qtc <500 and is due for this month. Discussed with team and concern for Paxil contributing to hyponatremia given past hospitalization with evidence for SIADH. Patient informed about Paxil being discontinued and he is agreeable.     Plan:  - Routine Obs, no SI  - D/c paroxetine 60 mg until further information is gather from psychiatrist given hx of chronic hyponatremia   - ORDER Haldol dec 150 mg IM once, QTc <500

## 2023-03-17 NOTE — PROGRESS NOTE ADULT - SUBJECTIVE AND OBJECTIVE BOX
CC: F/U for Wound infection    Saw/spoke to patient, generally well appearing. S/p I+D to buttock wound. No new complaints today.    Allergies  amoxicillin (Fever)  penicillin (Rash)      ANTIMICROBIALS:  cefepime   IVPB 2000 every 12 hours  metroNIDAZOLE    Tablet 500 two times a day  vancomycin  IVPB 1250 every 8 hours    PE:    Vital Signs Last 24 Hrs  T(C): 36.9 (17 Mar 2023 10:31), Max: 36.9 (17 Mar 2023 10:31)  T(F): 98.4 (17 Mar 2023 10:31), Max: 98.4 (17 Mar 2023 10:31)  HR: 75 (17 Mar 2023 10:31) (75 - 94)  BP: 168/83 (17 Mar 2023 10:31) (152/89 - 168/83)  RR: 18 (17 Mar 2023 10:31) (17 - 18)  SpO2: 94% (17 Mar 2023 10:31) (92% - 94%)    Gen: AOx3, NAD, non-toxic  CV: Nontachycardic  Resp: Breathing comfortably, NC2  Abd: Soft, nontender  IV/Skin: No thrombophlebitis    LABS:                        12.3   11.10 )-----------( 176      ( 17 Mar 2023 09:37 )             39.0     03-17    123<L>  |  84<L>  |  8   ----------------------------<  93  4.8   |  34<H>  |  0.62    Ca    8.8      17 Mar 2023 06:30  Phos  2.8     03-17  Mg     1.80     03-17    TPro  5.8<L>  /  Alb  2.9<L>  /  TBili  0.2  /  DBili  x   /  AST  55<H>  /  ALT  61<H>  /  AlkPhos  132<H>  03-17    MICROBIOLOGY:  Vancomycin Level, Trough: <4.0 ug/mL (03-16-23 @ 21:00)    Clean Catch Clean Catch (Midstream)  03-15-23   No growth  --  --    .Blood Blood-Peripheral  03-15-23   No growth to date.  --  --    .Blood Blood-Peripheral  03-15-23   No growth to date.  --  --    RADIOLOGY:    3/15 CT    IMPRESSION:  Left buttock cellulitis without evidence of abscess. Consider MRI to   evaluate for perianal fistula.

## 2023-03-17 NOTE — BH CONSULTATION LIAISON PROGRESS NOTE - NSBHFUPINTERVALCCFT_PSY_A_CORE
"I'm concerned about my mental health"  VSS  Na 124  LFT trending down    "I'm concerned about my mental health"  VSS, no fever  Na 123  LFT trending down   cpk trended down to 200's

## 2023-03-17 NOTE — PROCEDURE NOTE - ADDITIONAL PROCEDURE DETAILS
Left buttock wound with extensive fibrinous exudate  20cc of 1% lidocaine injected in the periphery of the wound  Sharp debridement of fibrinous exudate on the surface of the wound, revealed area of oozing healthy tissue  A punctate hole was noted in the most inferior edge of the wound, this was probed and a deeper cavity was discovered with purulent drainage, this cavity was blunty probed to break up underlying septations and more purulent drainage was expressed  A second hole was noted superior to this and also blunted probed, this revealed a tract to the same cavity as the first hole  A third hole was noted in the most superio-medial border of the wound, this was probed but did not lead to an underlying cavity  Each of the three holes were packed with three separate pieces of plain packing

## 2023-03-17 NOTE — DISCHARGE NOTE PROVIDER - CARE PROVIDER_API CALL
Ketan Mcclendon)  Internal Medicine  [address] [place] [address], [facility] [address]  [place] [address]  Phone: [phone]  Fax: [phone]  Follow Up Time: 1 week

## 2023-03-17 NOTE — DISCHARGE NOTE PROVIDER - NSDCFUSCHEDAPPT_GEN_ALL_CORE_FT
[facility]  [address]  Scheduled Appointment: 04/06/2023    [facility]  [address]  Scheduled Appointment: 05/04/2023    Sergio Shah  [facility]  DERM [address]  Scheduled Appointment: 05/08/2023    Boxer, Mitchell B  [facility]  ALLER[address]IM [address]  Scheduled Appointment: 05/10/2023    Boxer, Mitchell B  [facility]  [address]ER[address]IM [address]  Scheduled Appointment: 06/14/2023

## 2023-03-17 NOTE — BH CONSULTATION LIAISON PROGRESS NOTE - OTHER
Movement of mouth and blinking of eye Movement of mouth and blinking of eye----states that he has had that chronically

## 2023-03-17 NOTE — DISCHARGE NOTE PROVIDER - NSDCMRMEDTOKEN_GEN_ALL_CORE_FT
Albuterol (Eqv-ProAir HFA) 90 mcg/inh inhalation aerosol: 2 puff(s) inhaled every 6 hours, As Needed    Last filled 3/2022  Anoro Ellipta 62.5 mcg-25 mcg/inh inhalation powder: 1 puff(s) inhaled once a day    Last filled 6/10/22 but prescription was cancelled  cephalexin 500 mg oral tablet: 1 tab(s) orally 4 times a day   dilTIAZem 300 mg/24 hours oral capsule, extended release: 1 cap(s) orally once a day (in the morning)  Haldol Decanoate 100 mg/mL intramuscular solution: 150 milligram(s) intramuscularly every 4 weeks   [facility]: administered at clinic on 1/18/23   HYDROXYZINE HCL 25 MG TABLET: take 2 tablets by mouth at bedtime if needed  IRBESARTAN 300 MG TABLET:   melatonin 3 mg oral tablet: 1 tab(s) orally once a day (at bedtime), As needed, Insomnia  rosuvastatin 10 mg oral tablet: 1 tab(s) orally once a day (Last dispensed 12/2022 x 90 day).   Vitamin D3 50 mcg (2000 intl units) oral tablet: 1 tab(s) orally once a day   Albuterol (Eqv-ProAir HFA) 90 mcg/inh inhalation aerosol: 2 puff(s) inhaled every 6 hours, As Needed    Last filled 3/2022  Anoro Ellipta 62.5 mcg-25 mcg/inh inhalation powder: 1 puff(s) inhaled once a day    Last filled 6/10/22 but prescription was cancelled  dilTIAZem 300 mg/24 hours oral capsule, extended release: 1 cap(s) orally once a day (in the morning)  Haldol Decanoate 100 mg/mL intramuscular solution: 150 milligram(s) intramuscularly every 4 weeks Next dose due 4/16/2023  HYDROXYZINE HCL 25 MG TABLET: take 2 tablets by mouth at bedtime if needed  losartan 100 mg oral tablet: 1 tab(s) orally once a day  rosuvastatin 10 mg oral tablet: 1 tab(s) orally once a day (Last dispensed 12/2022 x 90 day).   vancomycin 1.5 g intravenous injection: 1.5 gram(s) intravenous every 8 hours  Vitamin D3 50 mcg (2000 intl units) oral tablet: 1 tab(s) orally once a day   Albuterol (Eqv-ProAir HFA) 90 mcg/inh inhalation aerosol: 2 puff(s) inhaled every 6 hours, As Needed    Last filled 3/2022  Anoro Ellipta 62.5 mcg-25 mcg/inh inhalation powder: 1 puff(s) inhaled once a day    Last filled 6/10/22 but prescription was cancelled  dilTIAZem 300 mg/24 hours oral capsule, extended release: 1 cap(s) orally once a day (in the morning)  Haldol Decanoate 100 mg/mL intramuscular solution: 150 milligram(s) intramuscularly every 4 weeks Next dose due 4/16/2023  HYDROXYZINE HCL 25 MG TABLET: take 2 tablets by mouth at bedtime if needed  losartan 100 mg oral tablet: 1 tab(s) orally once a day  rosuvastatin 10 mg oral tablet: 1 tab(s) orally once a day (Last dispensed 12/2022 x 90 day).   vancomycin 1.5 g intravenous injection: 1.5 gram(s) intravenous every 12 hours  Vitamin D3 50 mcg (2000 intl units) oral tablet: 1 tab(s) orally once a day

## 2023-03-17 NOTE — PROGRESS NOTE ADULT - SUBJECTIVE AND OBJECTIVE BOX
Surgery Progress Note    Subjective:     Patient seen and examined at bedside. VSS, AF. Dressing changed at bedside.     OBJECTIVE:     T(C): 36.6 (03-17-23 @ 19:08), Max: 36.9 (03-17-23 @ 10:31)  HR: 80 (03-17-23 @ 19:08) (75 - 93)  BP: 147/75 (03-17-23 @ 19:08) (147/75 - 168/83)  RR: 18 (03-17-23 @ 19:08) (18 - 18)  SpO2: 95% (03-17-23 @ 19:08) (92% - 95%)  Wt(kg): --    I&O's Detail    17 Mar 2023 07:01  -  17 Mar 2023 20:19  --------------------------------------------------------  IN:  Total IN: 0 mL    OUT:    Voided (mL): 700 mL  Total OUT: 700 mL    Total NET: -700 mL          PHYSICAL EXAM:    General: well developed, well nourished, NAD  Neuro: alert and oriented, no focal deficits, moves all extremities spontaneously  HEENT: NCAT, EOMI, anicteric, mucosa moist  Respiratory: airway patent, respirations unlabored on RA  CVS: regular rate and rhythm  Abdomen: soft, nontender, nondistended  Extremities: no edema, sensation and movement grossly intact  Skin: warm, dry, appropriate color  Back: left gluteal wound approximately [id number] but heterogenous in shape, fibrinous exudate present with some patchy necrotic areas, also surrounding cellulitic changes tender to touch, no fluctuance, no bleeding, no draining pus    MEDICATIONS  (STANDING):  albuterol    90 MICROgram(s) HFA Inhaler 2 Puff(s) Inhalation every 6 hours  cefepime   IVPB 2000 milliGRAM(s) IV Intermittent every 12 hours  cholecalciferol 2000 Unit(s) Oral daily  clobetasol 0.05% Ointment 1 Application(s) Topical two times a day  diltiazem    milliGRAM(s) Oral daily  enoxaparin Injectable 40 milliGRAM(s) SubCutaneous every 24 hours  lidocaine 1% Injectable 20 milliLiter(s) Local Injection once  losartan 100 milliGRAM(s) Oral daily  metroNIDAZOLE    Tablet 500 milliGRAM(s) Oral two times a day  nicotine - 21 mG/24Hr(s) Patch 1 Patch Transdermal daily  tiotropium 2.5 MICROgram(s) Inhaler 2 Puff(s) Inhalation daily  vancomycin  IVPB 1250 milliGRAM(s) IV Intermittent every 8 hours    MEDICATIONS  (PRN):  acetaminophen     Tablet .. 650 milliGRAM(s) Oral every 6 hours PRN Temp greater or equal to 38C (100.4F), Mild Pain (1 - 3)  albuterol/ipratropium for Nebulization 3 milliLiter(s) Nebulizer every 6 hours PRN Shortness of Breath  aluminum hydroxide/magnesium hydroxide/simethicone Suspension 30 milliLiter(s) Oral every 4 hours PRN Dyspepsia  hydrOXYzine hydrochloride 50 milliGRAM(s) Oral at bedtime PRN Anxiety  melatonin 3 milliGRAM(s) Oral at bedtime PRN Insomnia  nicotine  Polacrilex Lozenge 4 milliGRAM(s) Oral every 2 hours PRN craving/withdrawal  ondansetron Injectable 4 milliGRAM(s) IV Push every 8 hours PRN Nausea and/or Vomiting      LABS:                          12.3   11.10 )-----------( 176      ( 17 Mar 2023 09:37 )             39.0     03-17    123<L>  |  84<L>  |  8   ----------------------------<  93  4.8   |  34<H>  |  0.62    Ca    8.8      17 Mar 2023 06:30  Phos  2.8     03-17  Mg     1.80     03-17    TPro  5.8<L>  /  Alb  2.9<L>  /  TBili  0.2  /  DBili  x   /  AST  55<H>  /  ALT  61<H>  /  AlkPhos  132<H>  03-17

## 2023-03-17 NOTE — CHART NOTE - NSCHARTNOTEFT_GEN_A_CORE
Medicine NP note    Notified by RN that pt sleeping saturation on 2 L NC goes to low 90's , pt is obese and has COPD , continuous oxygenation ordered to monitor , patient placed on high Joya position saturation checked on lobe of ear versus finger , incentive spirometer given to pt and instructed ho to use , Saturation O2 @ L NC - 97 % , pt denies SOB , pt is on Duonebs prn , will monitor pt      Meenakshi Coyne NP-C  Department of Medicine- ACP  In House Pager #[phone]

## 2023-03-17 NOTE — PROGRESS NOTE ADULT - ASSESSMENT
57 year old with schizophrenia presents with a change in behavior  Noted to have a left gluteal wound    1) Left Buttock wound  Imaging and history raise concern for an underlying fistula  Appreciate surgery input and I+D procedure  Would obtain wound culture from site if any purulent drainage    Continue cefepime/ add flagyl 500 mg po q 12  Vanco 1250mg q 8, monitor levels  Wound care eval    2) Elevation in CPK  Seems unlikely to be due to tissue injury  trend CPK  hydrate    3) Tranaminitis  Check acute hepatitis panel  Trend    4) CVID  Check immuoglobulins  On imunoglobulin treatment monthly    Josr Downs MD  Contact on TEAMS messaging from 9am - 5pm  From 5pm-9am, on weekends, or if no response call [phone]   57 year old with schizophrenia, hypogammaglobulinemia on IVIG, presents with a change in behavior  Noted to have a left gluteal wound    1) Left Buttock wound  Imaging and history raise concern for an underlying fistula  Appreciate surgery input and I+D procedure; F/U surgery  Would obtain wound culture from site if any purulent drainage    Continue cefepime/ add flagyl 500 mg po q 12  Vanco 1250mg q 8, monitor levels  Wound care eval    2) Elevation in CPK  Seems unlikely to be due to tissue injury  trend CPK  hydrate    3) Tranaminitis  Check acute hepatitis panel  Trend    4) CVID  Check immuoglobulins  On imunoglobulin treatment monthly    Josr Downs MD  Contact on TEAMS messaging from 9am - 5pm  From 5pm-9am, on weekends, or if no response call [phone]

## 2023-03-17 NOTE — BH CONSULTATION LIAISON PROGRESS NOTE - NSBHFUPINTERVALHXFT_PSY_A_CORE
Patient resting calmly at bedside. States he has been sleeping and eating well. Patient states he has been       Dr. Cook, psychiatrist at [facility]: Patient resting calmly at bedside. States he has been sleeping and eating well. Patient states he has been good, feels safe and comfortable at hospital. Discussed with him, regarding previous hospitalization and why he left AMA. He reported feeling like previous hospitals were not giving him optimal care and also having a lot of craving to use cigarettes and go home. During this hospitalization, he feels that he is getting appropriate care and has no strong cravings to smoke cigarettes currently. Patient states he has been having some fecal accidents but has been issue he's had in past and attempted to clean himself prior to sister coming to pick him up. He has some concern about his "mental health" due to having incidence where he left stove on but when discussing with him, seem like he can at times be forgetful of things. He denies any depression or anxiety. No AVH or paranoia. No SI or HI. On exam, no rigidity or tremor.

## 2023-03-17 NOTE — DISCHARGE NOTE PROVIDER - HOSPITAL COURSE
Assessment and Plan:   · Assessment    56 y/o M with hx of multiple recent admissions of LLE cellulitis, hypogammaglobulinemia (on monthly IVIG), schizoaffective d/o (on haldol and Paxil) who was brought in by sister for bizarre behavior/delusional thinking, found to have significant cellulitis of the left buttocks with ulceration. no signs of nec fascitis at this time and no evidence of drainable abscess. Seen by Infectious Disease specialist , placed on  broad spectrum abx ( Cefepime/ Vancomycin/ Flagyl) CT A/ P showed no abscess and fistula , wound care were consulted and recommended steroid cream x 1 week ( Clobetasol ) to LLE used during previous admission. Hypertension was treated with home meds diltiazem and losartan , surgical consult was done and debridement of the left gluteal wound was done 3/16. Patient has chronic hyponatremia most likely  due to psychiatric medications and psychiatrists is aware ( patient has schizoaffective disorder) of hyponatremia and relation to Haldol and Paxil , however there is unclear source of hyponatremia ( polydypsia, SIADH) . Patient has   CVID (common variable immunodeficiency)., last infusion was 3/9/23 and this is 2nd infusion this year. Patient has elevated LFT's , denies alcohol consumption , CT A/ P shows no pathology , hepatic panel to be trended , pt on Lovenox prophylaxis       ***** no complete yet********          On ___ this case was reviewed with  ____, the patient is medically stable and optimized for discharge. All medications were reviewed and prescriptions were sent to mutually agreed upon pharmacy.         58 y/o M with hx of multiple recent admissions of LLE cellulitis, hypogammaglobulinemia (on monthly IVIG), schizoaffective d/o (on haldol and Paxil) who was brought in by sister for bizarre behavior/delusional thinking, found to have significant cellulitis of the left buttocks with ulceration. no signs of nec fascitis at this time and no evidence of drainable abscess. Seen by Infectious Disease specialist , placed on  broad spectrum abx ( Cefepime/ Vancomycin/ Flagyl). Pt to be on Vancomycin til 4/11/23, midline placed. 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 Hypertension was treated with home meds diltiazem and losartan , surgical consult was done and debridement of the left gluteal wound was done 3/16. Patient has chronic hyponatremia most likely  due to psychiatric medications and psychiatrists is aware ( patient has schizoaffective disorder) of hyponatremia and relation to Haldol and Paxil , however there is unclear source of hyponatremia ( polydypsia, SIADH) . Patient has CVID (common variable immunodeficiency)., last infusion was 3/9/23 and this is 2nd infusion this year. Patient has elevated LFT's , denies alcohol consumption , CT A/ P shows no pathology , hepatic pane trended, pt on Lovenox prophylaxis       On 3/29/2023 this case was reviewed with Dr. Morales, the patient is medically stable and optimized for discharge. All medications were reviewed and prescriptions were sent to mutually agreed upon pharmacy.         58 y/o M with hx of multiple recent admissions of LLE cellulitis, hypogammaglobulinemia (on monthly IVIG), schizoaffective d/o (on haldol and Paxil) who was brought in by sister for bizarre behavior/delusional thinking, found to have significant cellulitis of the left buttocks with ulceration. no signs of nec fascitis at this time and no evidence of drainable abscess. Seen by Infectious Disease specialist , placed on  broad spectrum abx ( Cefepime/ Vancomycin/ Flagyl). Pt to be on Vancomycin til 4/11/23, midline placed. 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 Hypertension was treated with home meds diltiazem and losartan , surgical consult was done and debridement of the left gluteal wound was done 3/16. Patient has chronic hyponatremia most likely  due to psychiatric medications and psychiatrists is aware ( patient has schizoaffective disorder) of hyponatremia and relation to Haldol and Paxil , however there is unclear source of hyponatremia ( polydypsia, SIADH) . Patient has CVID (common variable immunodeficiency)., last infusion was 3/9/23 and this is 2nd infusion this year. Patient has elevated LFT's , denies alcohol consumption , CT A/ P shows no pathology , hepatic pane trended, pt on Lovenox prophylaxis       On 3/29/2023 this case was reviewed with Dr. Morales, the patient is medically stable and optimized for discharge. All medications were reviewed and prescriptions were sent to mutually agreed upon pharmacy.      Attending Addendum:  Patient seen and examined by me on the discharge day. Medications reviewed.  All questions answered in details. Follow up plan explained.  More than 30 mins were spent evaluating patient and coordinating care for discharge.  Discharge summary sent to pt's primary care physician at [facility].

## 2023-03-17 NOTE — PROGRESS NOTE ADULT - NSPROGADDITIONALINFOA_GEN_ALL_CORE
GISELL improving.     d/w pt and PA.    - Dr. JAKE Morales ([facility])  - [phone] CPK improving.   hyponatremia, renal consulted.  start fluid restriction.     d/w pt and PA.    - Dr. JOSEPH Htet ([facility])  - [phone]

## 2023-03-17 NOTE — BH CONSULTATION LIAISON PROGRESS NOTE - NSBHATTESTCOMMENTATTENDFT_PSY_A_CORE
met with the patient. Discussed with resident md impression and plan discussed and agreed upon.  Patient is due for his haldol dec 150mg IM on 3/15.   lft trending down, vitals stable, no fever, cpk trending down to 200's, no rigidity or tremors.

## 2023-03-18 LAB
ALBUMIN SERPL ELPH-MCNC: 3.2 G/DL — LOW (ref 3.3–5)
ALP SERPL-CCNC: 132 U/L — HIGH (ref 40–120)
ALT FLD-CCNC: 56 U/L — HIGH (ref 4–41)
ANION GAP SERPL CALC-SCNC: 9 MMOL/L — SIGNIFICANT CHANGE UP (ref 7–14)
AST SERPL-CCNC: 44 U/L — HIGH (ref 4–40)
BASOPHILS # BLD AUTO: 0.03 K/UL — SIGNIFICANT CHANGE UP (ref 0–0.2)
BASOPHILS NFR BLD AUTO: 0.3 % — SIGNIFICANT CHANGE UP (ref 0–2)
BILIRUB SERPL-MCNC: 0.2 MG/DL — SIGNIFICANT CHANGE UP (ref 0.2–1.2)
BUN SERPL-MCNC: 10 MG/DL — SIGNIFICANT CHANGE UP (ref 7–23)
CALCIUM SERPL-MCNC: 9.3 MG/DL — SIGNIFICANT CHANGE UP (ref 8.4–10.5)
CHLORIDE SERPL-SCNC: 89 MMOL/L — LOW (ref 98–107)
CK SERPL-CCNC: 153 U/L — SIGNIFICANT CHANGE UP (ref 30–200)
CO2 SERPL-SCNC: 30 MMOL/L — SIGNIFICANT CHANGE UP (ref 22–31)
CREAT SERPL-MCNC: 0.63 MG/DL — SIGNIFICANT CHANGE UP (ref 0.5–1.3)
EGFR: 112 ML/MIN/1.73M2 — SIGNIFICANT CHANGE UP
EOSINOPHIL # BLD AUTO: 0.19 K/UL — SIGNIFICANT CHANGE UP (ref 0–0.5)
EOSINOPHIL NFR BLD AUTO: 2.1 % — SIGNIFICANT CHANGE UP (ref 0–6)
GLUCOSE SERPL-MCNC: 74 MG/DL — SIGNIFICANT CHANGE UP (ref 70–99)
HCT VFR BLD CALC: 40.6 % — SIGNIFICANT CHANGE UP (ref 39–50)
HGB BLD-MCNC: 12.9 G/DL — LOW (ref 13–17)
IANC: 6.33 K/UL — SIGNIFICANT CHANGE UP (ref 1.8–7.4)
IGE SERPL-ACNC: <2 KU/L — SIGNIFICANT CHANGE UP
IMM GRANULOCYTES NFR BLD AUTO: 4.2 % — HIGH (ref 0–0.9)
LYMPHOCYTES # BLD AUTO: 1.25 K/UL — SIGNIFICANT CHANGE UP (ref 1–3.3)
LYMPHOCYTES # BLD AUTO: 13.9 % — SIGNIFICANT CHANGE UP (ref 13–44)
MAGNESIUM SERPL-MCNC: 2.2 MG/DL — SIGNIFICANT CHANGE UP (ref 1.6–2.6)
MCHC RBC-ENTMCNC: 25.2 PG — LOW (ref 27–34)
MCHC RBC-ENTMCNC: 31.8 GM/DL — LOW (ref 32–36)
MCV RBC AUTO: 79.3 FL — LOW (ref 80–100)
MONOCYTES # BLD AUTO: 0.84 K/UL — SIGNIFICANT CHANGE UP (ref 0–0.9)
MONOCYTES NFR BLD AUTO: 9.3 % — SIGNIFICANT CHANGE UP (ref 2–14)
NEUTROPHILS # BLD AUTO: 6.33 K/UL — SIGNIFICANT CHANGE UP (ref 1.8–7.4)
NEUTROPHILS NFR BLD AUTO: 70.2 % — SIGNIFICANT CHANGE UP (ref 43–77)
NRBC # BLD: 0 /100 WBCS — SIGNIFICANT CHANGE UP (ref 0–0)
NRBC # FLD: 0 K/UL — SIGNIFICANT CHANGE UP (ref 0–0)
PHOSPHATE SERPL-MCNC: 4.1 MG/DL — SIGNIFICANT CHANGE UP (ref 2.5–4.5)
PLATELET # BLD AUTO: 199 K/UL — SIGNIFICANT CHANGE UP (ref 150–400)
POTASSIUM SERPL-MCNC: 4.7 MMOL/L — SIGNIFICANT CHANGE UP (ref 3.5–5.3)
POTASSIUM SERPL-SCNC: 4.7 MMOL/L — SIGNIFICANT CHANGE UP (ref 3.5–5.3)
PROT SERPL-MCNC: 6.2 G/DL — SIGNIFICANT CHANGE UP (ref 6–8.3)
RBC # BLD: 5.12 M/UL — SIGNIFICANT CHANGE UP (ref 4.2–5.8)
RBC # FLD: 15.4 % — HIGH (ref 10.3–14.5)
SODIUM SERPL-SCNC: 128 MMOL/L — LOW (ref 135–145)
VANCOMYCIN TROUGH SERPL-MCNC: 7.1 UG/ML — LOW (ref 10–20)
WBC # BLD: 9.02 K/UL — SIGNIFICANT CHANGE UP (ref 3.8–10.5)
WBC # FLD AUTO: 9.02 K/UL — SIGNIFICANT CHANGE UP (ref 3.8–10.5)

## 2023-03-18 RX ORDER — VANCOMYCIN HCL 1 G
1500 VIAL (EA) INTRAVENOUS EVERY 8 HOURS
Refills: 0 | Status: DISCONTINUED | OUTPATIENT
Start: 2023-03-18 | End: 2023-03-29

## 2023-03-18 RX ADMIN — Medication 1 APPLICATION(S): at 16:07

## 2023-03-18 RX ADMIN — LOSARTAN POTASSIUM 100 MILLIGRAM(S): 100 TABLET, FILM COATED ORAL at 06:47

## 2023-03-18 RX ADMIN — Medication 1 PATCH: at 18:34

## 2023-03-18 RX ADMIN — Medication 500 MILLIGRAM(S): at 16:07

## 2023-03-18 RX ADMIN — ALBUTEROL 2 PUFF(S): 90 AEROSOL, METERED ORAL at 16:07

## 2023-03-18 RX ADMIN — Medication 1 PATCH: at 08:20

## 2023-03-18 RX ADMIN — CEFEPIME 100 MILLIGRAM(S): 1 INJECTION, POWDER, FOR SOLUTION INTRAMUSCULAR; INTRAVENOUS at 02:28

## 2023-03-18 RX ADMIN — CEFEPIME 100 MILLIGRAM(S): 1 INJECTION, POWDER, FOR SOLUTION INTRAMUSCULAR; INTRAVENOUS at 13:09

## 2023-03-18 RX ADMIN — TIOTROPIUM BROMIDE 2 PUFF(S): 18 CAPSULE ORAL; RESPIRATORY (INHALATION) at 09:05

## 2023-03-18 RX ADMIN — Medication 2000 UNIT(S): at 11:37

## 2023-03-18 RX ADMIN — ALBUTEROL 2 PUFF(S): 90 AEROSOL, METERED ORAL at 09:05

## 2023-03-18 RX ADMIN — Medication 1 MILLIGRAM(S): at 00:21

## 2023-03-18 RX ADMIN — Medication 1 PATCH: at 13:09

## 2023-03-18 RX ADMIN — Medication 650 MILLIGRAM(S): at 21:31

## 2023-03-18 RX ADMIN — Medication 650 MILLIGRAM(S): at 22:31

## 2023-03-18 RX ADMIN — Medication 500 MILLIGRAM(S): at 06:46

## 2023-03-18 RX ADMIN — ALBUTEROL 2 PUFF(S): 90 AEROSOL, METERED ORAL at 06:47

## 2023-03-18 RX ADMIN — Medication 166.67 MILLIGRAM(S): at 04:32

## 2023-03-18 RX ADMIN — Medication 1 PATCH: at 13:08

## 2023-03-18 RX ADMIN — Medication 300 MILLIGRAM(S): at 06:47

## 2023-03-18 RX ADMIN — Medication 300 MILLIGRAM(S): at 16:05

## 2023-03-18 RX ADMIN — Medication 1 APPLICATION(S): at 06:46

## 2023-03-18 RX ADMIN — ENOXAPARIN SODIUM 40 MILLIGRAM(S): 100 INJECTION SUBCUTANEOUS at 18:21

## 2023-03-18 RX ADMIN — ALBUTEROL 2 PUFF(S): 90 AEROSOL, METERED ORAL at 21:31

## 2023-03-18 RX ADMIN — ALBUTEROL 2 PUFF(S): 90 AEROSOL, METERED ORAL at 00:00

## 2023-03-18 NOTE — PROVIDER CONTACT NOTE (CRITICAL VALUE NOTIFICATION) - BACKGROUND
Patient was admitted for cellulitis of buttock and LLE. Sister bought patient in because of bizarre behavior/ delusional thinking. History of leaving AMA from [facility].

## 2023-03-18 NOTE — PROVIDER CONTACT NOTE (CRITICAL VALUE NOTIFICATION) - SITUATION
Patient is A&Ox3, Vs stable. Denies chest pain and SOB. Patient was admitted for cellulitis of buttock and LLE. Sister bought patient in because of bizarre behavior/ delusional thinking. History of leaving AMA from [facility]. Patient final blood culture on 3/12/23 resulted with MRSA in anaerobic bottle.

## 2023-03-18 NOTE — PROGRESS NOTE ADULT - SUBJECTIVE AND OBJECTIVE BOX
Surgery Progress Note    Subjective:     Patient seen and examined at bedside. VSS, AF. Dressing changed at bedside.   Patient reports pain is well controlled. Reports large amounts of drainage.     OBJECTIVE:     T(C): 36.6 (03-17-23 @ 19:08), Max: 36.9 (03-17-23 @ 10:31)  HR: 80 (03-17-23 @ 19:08) (75 - 93)  BP: 147/75 (03-17-23 @ 19:08) (147/75 - 168/83)  RR: 18 (03-17-23 @ 19:08) (18 - 18)  SpO2: 95% (03-17-23 @ 19:08) (92% - 95%)  Wt(kg): --    I&O's Detail    17 Mar 2023 07:01  -  17 Mar 2023 20:19  --------------------------------------------------------  IN:  Total IN: 0 mL    OUT:    Voided (mL): 700 mL  Total OUT: 700 mL    Total NET: -700 mL          PHYSICAL EXAM:    General: well developed, well nourished, NAD  Neuro: alert and oriented, no focal deficits, moves all extremities spontaneously  HEENT: NCAT, EOMI, anicteric, mucosa moist  Respiratory: airway patent, respirations unlabored on RA  CVS: regular rate and rhythm  Abdomen: soft, nontender, nondistended  Extremities: no edema, sensation and movement grossly intact  Skin: warm, dry, appropriate color  Back: left gluteal wound approximately [id number] but heterogenous in shape, fibrinous exudate present with some patchy necrotic areas, also surrounding cellulitic changes tender to touch, no fluctuance, no bleeding, no draining pus    MEDICATIONS  (STANDING):  albuterol    90 MICROgram(s) HFA Inhaler 2 Puff(s) Inhalation every 6 hours  cefepime   IVPB 2000 milliGRAM(s) IV Intermittent every 12 hours  cholecalciferol 2000 Unit(s) Oral daily  clobetasol 0.05% Ointment 1 Application(s) Topical two times a day  diltiazem    milliGRAM(s) Oral daily  enoxaparin Injectable 40 milliGRAM(s) SubCutaneous every 24 hours  lidocaine 1% Injectable 20 milliLiter(s) Local Injection once  losartan 100 milliGRAM(s) Oral daily  metroNIDAZOLE    Tablet 500 milliGRAM(s) Oral two times a day  nicotine - 21 mG/24Hr(s) Patch 1 Patch Transdermal daily  tiotropium 2.5 MICROgram(s) Inhaler 2 Puff(s) Inhalation daily  vancomycin  IVPB 1250 milliGRAM(s) IV Intermittent every 8 hours    MEDICATIONS  (PRN):  acetaminophen     Tablet .. 650 milliGRAM(s) Oral every 6 hours PRN Temp greater or equal to 38C (100.4F), Mild Pain (1 - 3)  albuterol/ipratropium for Nebulization 3 milliLiter(s) Nebulizer every 6 hours PRN Shortness of Breath  aluminum hydroxide/magnesium hydroxide/simethicone Suspension 30 milliLiter(s) Oral every 4 hours PRN Dyspepsia  hydrOXYzine hydrochloride 50 milliGRAM(s) Oral at bedtime PRN Anxiety  melatonin 3 milliGRAM(s) Oral at bedtime PRN Insomnia  nicotine  Polacrilex Lozenge 4 milliGRAM(s) Oral every 2 hours PRN craving/withdrawal  ondansetron Injectable 4 milliGRAM(s) IV Push every 8 hours PRN Nausea and/or Vomiting      LABS:                          12.3   11.10 )-----------( 176      ( 17 Mar 2023 09:37 )             39.0     03-17    123<L>  |  84<L>  |  8   ----------------------------<  93  4.8   |  34<H>  |  0.62    Ca    8.8      17 Mar 2023 06:30  Phos  2.8     03-17  Mg     1.80     03-17    TPro  5.8<L>  /  Alb  2.9<L>  /  TBili  0.2  /  DBili  x   /  AST  55<H>  /  ALT  61<H>  /  AlkPhos  132<H>  03-17

## 2023-03-18 NOTE — PROGRESS NOTE ADULT - SUBJECTIVE AND OBJECTIVE BOX
SUBJECTIVE/ OVERNIGHT EVENTS:  MRSA+ on 3/12 cultures?  on vanco IV  surgery and ID following  no cp, no sob, no n/v/d. no abdominal pain.  no headache, no dizziness.   Na improving with fluid restriction      --------------------------------------------------------------------------------------------  LABS:                        12.9   9.02  )-----------( 199      ( 18 Mar 2023 05:35 )             40.6     03-18    128<L>  |  89<L>  |  10  ----------------------------<  74  4.7   |  30  |  0.63    Ca    9.3      18 Mar 2023 05:35  Phos  4.1     03-18  Mg     2.20     03-18    TPro  6.2  /  Alb  3.2<L>  /  TBili  0.2  /  DBili  x   /  AST  44<H>  /  ALT  56<H>  /  AlkPhos  132<H>  03-18      CAPILLARY BLOOD GLUCOSE      POCT Blood Glucose.: 202 mg/dL (18 Mar 2023 22:26)  POCT Blood Glucose.: 100 mg/dL (18 Mar 2023 16:26)  POCT Blood Glucose.: 138 mg/dL (18 Mar 2023 11:54)  POCT Blood Glucose.: 111 mg/dL (18 Mar 2023 07:34)    CARDIAC MARKERS ( 18 Mar 2023 05:35 )  x     / x     / 153 U/L / x     / x      CARDIAC MARKERS ( 17 Mar 2023 06:30 )  x     / x     / 257 U/L / x     / x          Urinalysis Basic - ( 17 Mar 2023 17:45 )    Color: Colorless / Appearance: Clear / S.007 / pH: x  Gluc: x / Ketone: Negative  / Bili: Negative / Urobili: <2 mg/dL   Blood: x / Protein: Trace / Nitrite: Negative   Leuk Esterase: Negative / RBC: x / WBC x   Sq Epi: x / Non Sq Epi: x / Bacteria: x        RADIOLOGY & ADDITIONAL TESTS:    Imaging Personally Reviewed:  [x] YES  [ ] NO    Consultant(s) Notes Reviewed:  [x] YES  [ ] NO    MEDICATIONS  (STANDING):  albuterol    90 MICROgram(s) HFA Inhaler 2 Puff(s) Inhalation every 6 hours  cefepime   IVPB 2000 milliGRAM(s) IV Intermittent every 12 hours  cholecalciferol 2000 Unit(s) Oral daily  clobetasol 0.05% Ointment 1 Application(s) Topical two times a day  diltiazem    milliGRAM(s) Oral daily  enoxaparin Injectable 40 milliGRAM(s) SubCutaneous every 24 hours  lidocaine 1% Injectable 20 milliLiter(s) Local Injection once  losartan 100 milliGRAM(s) Oral daily  metroNIDAZOLE    Tablet 500 milliGRAM(s) Oral two times a day  nicotine - 21 mG/24Hr(s) Patch 1 Patch Transdermal daily  tiotropium 2.5 MICROgram(s) Inhaler 2 Puff(s) Inhalation daily  vancomycin  IVPB 1500 milliGRAM(s) IV Intermittent every 8 hours    MEDICATIONS  (PRN):  acetaminophen     Tablet .. 650 milliGRAM(s) Oral every 6 hours PRN Temp greater or equal to 38C (100.4F), Mild Pain (1 - 3)  albuterol/ipratropium for Nebulization 3 milliLiter(s) Nebulizer every 6 hours PRN Shortness of Breath  aluminum hydroxide/magnesium hydroxide/simethicone Suspension 30 milliLiter(s) Oral every 4 hours PRN Dyspepsia  hydrOXYzine hydrochloride 50 milliGRAM(s) Oral at bedtime PRN Anxiety  melatonin 3 milliGRAM(s) Oral at bedtime PRN Insomnia  nicotine  Polacrilex Lozenge 4 milliGRAM(s) Oral every 2 hours PRN craving/withdrawal  ondansetron Injectable 4 milliGRAM(s) IV Push every 8 hours PRN Nausea and/or Vomiting      Care Discussed with Consultants/Other Providers [x] YES  [ ] NO    Vital Signs Last 24 Hrs  T(C): 36.6 (18 Mar 2023 20:56), Max: 37.1 (18 Mar 2023 10:42)  T(F): 97.9 (18 Mar 2023 20:56), Max: 98.7 (18 Mar 2023 10:42)  HR: 82 (18 Mar 2023 20:56) (82 - 96)  BP: 157/93 (18 Mar 2023 20:56) (134/64 - 160/84)  BP(mean): --  RR: 18 (18 Mar 2023 20:56) (18 - 18)  SpO2: 100% (18 Mar 2023 20:56) (94% - 100%)    Parameters below as of 18 Mar 2023 20:56  Patient On (Oxygen Delivery Method): nasal cannula  O2 Flow (L/min): 2    I&O's Summary    17 Mar 2023 07:01  -  18 Mar 2023 07:00  --------------------------------------------------------  IN: 300 mL / OUT: 1200 mL / NET: -900 mL    18 Mar 2023 07:01  -  18 Mar 2023 23:09  --------------------------------------------------------  IN: 900 mL / OUT: 0 mL / NET: 900 mL        PHYSICAL EXAM:  GENERAL: NAD, well-developed, comfortable  HEAD:  Atraumatic, Normocephalic  EYES: EOMI, PERRLA, conjunctiva and sclera clear  NECK: Supple, No JVD  CHEST/LUNG: mild decrease breath sounds bilaterally; No wheeze   HEART: Regular rate and rhythm; No murmurs, rubs, or gallops  ABDOMEN: Soft, Nontender, Nondistended; Bowel sounds present  NEURO: AAOx3, understands why he is admitted, able to move all four extremities spontaneously  Skin: left buttocks: erythematous, indurated, with shallow ulceration (non bleeding/draining) about 4-5 cm, indurated, no fluctuance noted. LLE calf: non healing bleeding wound about 2cm diameter, no superimposed infection

## 2023-03-18 NOTE — CHART NOTE - NSCHARTNOTEFT_GEN_A_CORE
notified by RN that blood cultures from 03/12 were positive for MRSA.  Pt was admitted to [facility] on March 15th. Patient was at [facility] on March 12th.   Writer confirmed with lab that blood cultures from [facility] were positive for MRSA on 03/12.  BCx from March 15th NGTD.   Patient placed on contact precautions. Discussed with ADN.  Discussed with ID. Will obtain TTE and continue vanco. notified by RN that blood cultures from 03/12 were positive for MRSA.  Pt was admitted to [facility] on March 15th. Patient was at [facility] on March 12th.   Writer confirmed with lab ([phone]) that blood cultures from [facility] were positive for MRSA on 03/12.  BCx from March 15th NGTD.   Patient placed on contact precautions. Discussed with ADN.  Discussed with ID. Will obtain TTE and continue vanco.

## 2023-03-18 NOTE — PROVIDER CONTACT NOTE (CRITICAL VALUE NOTIFICATION) - ACTION/TREATMENT ORDERED:
ACP notified, lab was called about the cultures, which were done from [facility] on the 12th. Patient was negative from the 15th, and no growth to date. No isolation needed because cultures done at [facility] were negative, as per provider Niki Brambila. ACP notified, lab was called about the cultures, which were done from [facility] on the 12th. Patient was negative from the 15th, and no growth to date. Provider will place patient on contact precautions until Infection control is contacted.

## 2023-03-18 NOTE — PROGRESS NOTE ADULT - ASSESSMENT
57 year old with schizophrenia presents with a change in behavior.  Noted to have a left gluteal wound.    Plan:   - no clinical evidence of fistula  - dressing changes  - can remove small packing in wound tomorrow  - rest of care per primary  -c/w abx    B Team Surgery  [id number]

## 2023-03-18 NOTE — PROVIDER CONTACT NOTE (CRITICAL VALUE NOTIFICATION) - RECOMMENDATIONS
ACP notified, lab was called about the cultures, which were done from [facility] on the 12th. Patient was negative from the 15th, and no growth to date. ACP notified, lab was called about the cultures, which were done from [facility] on the 12th. Patient was negative from the 15th, and no growth to date. Provider will place patient on contact precautions until Infection control is contacted.

## 2023-03-18 NOTE — PHYSICAL THERAPY INITIAL EVALUATION ADULT - PERTINENT HX OF CURRENT PROBLEM, REHAB EVAL
57  year old Male with hx of multiple recent admissions of Left LE cellulitis, hypogammaglobulinemia (on monthly IVIG), schizoaffective d/o (on haldol and Paxil) who was brought in by sister for bizarre behavior/delusional thinking, found to have significant cellulitis of the left buttocks with ulceration.

## 2023-03-18 NOTE — PHYSICAL THERAPY INITIAL EVALUATION ADULT - ADDITIONAL COMMENTS
spo2 assessed to be 96%     Pt. left seated on edge of bed with all tubes/lines intact, call bell in reach and in NAD.

## 2023-03-18 NOTE — PROGRESS NOTE ADULT - NSPROGADDITIONALINFOA_GEN_ALL_CORE
d/w pt and PA.    - [facility] JAKE [facility]ales ([facility])  - [phone]

## 2023-03-19 LAB — VANCOMYCIN TROUGH SERPL-MCNC: 9.8 UG/ML — LOW (ref 10–20)

## 2023-03-19 RX ADMIN — Medication 1 PATCH: at 14:26

## 2023-03-19 RX ADMIN — Medication 300 MILLIGRAM(S): at 05:49

## 2023-03-19 RX ADMIN — ALBUTEROL 2 PUFF(S): 90 AEROSOL, METERED ORAL at 10:41

## 2023-03-19 RX ADMIN — Medication 2000 UNIT(S): at 14:26

## 2023-03-19 RX ADMIN — ALBUTEROL 2 PUFF(S): 90 AEROSOL, METERED ORAL at 23:09

## 2023-03-19 RX ADMIN — ALBUTEROL 2 PUFF(S): 90 AEROSOL, METERED ORAL at 18:07

## 2023-03-19 RX ADMIN — Medication 1 PATCH: at 07:32

## 2023-03-19 RX ADMIN — Medication 500 MILLIGRAM(S): at 18:07

## 2023-03-19 RX ADMIN — TIOTROPIUM BROMIDE 2 PUFF(S): 18 CAPSULE ORAL; RESPIRATORY (INHALATION) at 10:41

## 2023-03-19 RX ADMIN — CEFEPIME 100 MILLIGRAM(S): 1 INJECTION, POWDER, FOR SOLUTION INTRAMUSCULAR; INTRAVENOUS at 03:49

## 2023-03-19 RX ADMIN — ALBUTEROL 2 PUFF(S): 90 AEROSOL, METERED ORAL at 05:49

## 2023-03-19 RX ADMIN — CEFEPIME 100 MILLIGRAM(S): 1 INJECTION, POWDER, FOR SOLUTION INTRAMUSCULAR; INTRAVENOUS at 14:27

## 2023-03-19 RX ADMIN — Medication 1 APPLICATION(S): at 05:49

## 2023-03-19 RX ADMIN — Medication 300 MILLIGRAM(S): at 10:40

## 2023-03-19 RX ADMIN — LOSARTAN POTASSIUM 100 MILLIGRAM(S): 100 TABLET, FILM COATED ORAL at 05:49

## 2023-03-19 RX ADMIN — Medication 1 PATCH: at 18:27

## 2023-03-19 RX ADMIN — Medication 1 APPLICATION(S): at 18:07

## 2023-03-19 RX ADMIN — ENOXAPARIN SODIUM 40 MILLIGRAM(S): 100 INJECTION SUBCUTANEOUS at 18:09

## 2023-03-19 RX ADMIN — Medication 500 MILLIGRAM(S): at 05:49

## 2023-03-19 RX ADMIN — Medication 300 MILLIGRAM(S): at 00:59

## 2023-03-19 NOTE — PROGRESS NOTE ADULT - SUBJECTIVE AND OBJECTIVE BOX
Surgery Progress Note    Subjective:     Patient seen and examined at bedside. VSS, AF.    Patient reports pain is well controlled. Reports drainage.     OBJECTIVE:     T(C): 36.6 (03-17-23 @ 19:08), Max: 36.9 (03-17-23 @ 10:31)  HR: 80 (03-17-23 @ 19:08) (75 - 93)  BP: 147/75 (03-17-23 @ 19:08) (147/75 - 168/83)  RR: 18 (03-17-23 @ 19:08) (18 - 18)  SpO2: 95% (03-17-23 @ 19:08) (92% - 95%)  Wt(kg): --    I&O's Detail    17 Mar 2023 07:01  -  17 Mar 2023 20:19  --------------------------------------------------------  IN:  Total IN: 0 mL    OUT:    Voided (mL): 700 mL  Total OUT: 700 mL    Total NET: -700 mL          PHYSICAL EXAM:    General: well developed, well nourished, NAD  Neuro: alert and oriented, no focal deficits, moves all extremities spontaneously  HEENT: NCAT, EOMI, anicteric, mucosa moist  Respiratory: airway patent, respirations unlabored on RA  CVS: regular rate and rhythm  Abdomen: soft, nontender, nondistended  Extremities: no edema, sensation and movement grossly intact  Skin: warm, dry, appropriate color  Back: left gluteal wound approximately [id number] but heterogenous in shape, fibrinous exudate present with some patchy necrotic areas, also surrounding cellulitic changes tender to touch, no fluctuance, no bleeding, no draining pus    MEDICATIONS  (STANDING):  albuterol    90 MICROgram(s) HFA Inhaler 2 Puff(s) Inhalation every 6 hours  cefepime   IVPB 2000 milliGRAM(s) IV Intermittent every 12 hours  cholecalciferol 2000 Unit(s) Oral daily  clobetasol 0.05% Ointment 1 Application(s) Topical two times a day  diltiazem    milliGRAM(s) Oral daily  enoxaparin Injectable 40 milliGRAM(s) SubCutaneous every 24 hours  lidocaine 1% Injectable 20 milliLiter(s) Local Injection once  losartan 100 milliGRAM(s) Oral daily  metroNIDAZOLE    Tablet 500 milliGRAM(s) Oral two times a day  nicotine - 21 mG/24Hr(s) Patch 1 Patch Transdermal daily  tiotropium 2.5 MICROgram(s) Inhaler 2 Puff(s) Inhalation daily  vancomycin  IVPB 1250 milliGRAM(s) IV Intermittent every 8 hours    MEDICATIONS  (PRN):  acetaminophen     Tablet .. 650 milliGRAM(s) Oral every 6 hours PRN Temp greater or equal to 38C (100.4F), Mild Pain (1 - 3)  albuterol/ipratropium for Nebulization 3 milliLiter(s) Nebulizer every 6 hours PRN Shortness of Breath  aluminum hydroxide/magnesium hydroxide/simethicone Suspension 30 milliLiter(s) Oral every 4 hours PRN Dyspepsia  hydrOXYzine hydrochloride 50 milliGRAM(s) Oral at bedtime PRN Anxiety  melatonin 3 milliGRAM(s) Oral at bedtime PRN Insomnia  nicotine  Polacrilex Lozenge 4 milliGRAM(s) Oral every 2 hours PRN craving/withdrawal  ondansetron Injectable 4 milliGRAM(s) IV Push every 8 hours PRN Nausea and/or Vomiting      LABS:                          12.3   11.10 )-----------( 176      ( 17 Mar 2023 09:37 )             39.0     03-17    123<L>  |  84<L>  |  8   ----------------------------<  93  4.8   |  34<H>  |  0.62    Ca    8.8      17 Mar 2023 06:30  Phos  2.8     03-17  Mg     1.80     03-17    TPro  5.8<L>  /  Alb  2.9<L>  /  TBili  0.2  /  DBili  x   /  AST  55<H>  /  ALT  61<H>  /  AlkPhos  132<H>  03-17

## 2023-03-19 NOTE — PROGRESS NOTE ADULT - SUBJECTIVE AND OBJECTIVE BOX
SUBJECTIVE/ OVERNIGHT EVENTS:  stable overall.  awake alert. comfortable.  denied pain.  no n/v/d.   tolerating diet.       --------------------------------------------------------------------------------------------  LABS:                        12.9   9.02  )-----------( 199      ( 18 Mar 2023 05:35 )             40.6     03-18    128<L>  |  89<L>  |  10  ----------------------------<  74  4.7   |  30  |  0.63    Ca    9.3      18 Mar 2023 05:35  Phos  4.1     03-18  Mg     2.20     03-18    TPro  6.2  /  Alb  3.2<L>  /  TBili  0.2  /  DBili  x   /  AST  44<H>  /  ALT  56<H>  /  AlkPhos  132<H>  03-18      CAPILLARY BLOOD GLUCOSE      POCT Blood Glucose.: 158 mg/dL (19 Mar 2023 21:46)  POCT Blood Glucose.: 87 mg/dL (19 Mar 2023 16:55)  POCT Blood Glucose.: 238 mg/dL (19 Mar 2023 12:33)  POCT Blood Glucose.: 110 mg/dL (19 Mar 2023 07:47)    CARDIAC MARKERS ( 18 Mar 2023 05:35 )  x     / x     / 153 U/L / x     / x              RADIOLOGY & ADDITIONAL TESTS:    Imaging Personally Reviewed:  [x] YES  [ ] NO    Consultant(s) Notes Reviewed:  [x] YES  [ ] NO    MEDICATIONS  (STANDING):  albuterol    90 MICROgram(s) HFA Inhaler 2 Puff(s) Inhalation every 6 hours  cefepime   IVPB 2000 milliGRAM(s) IV Intermittent every 12 hours  cholecalciferol 2000 Unit(s) Oral daily  clobetasol 0.05% Ointment 1 Application(s) Topical two times a day  diltiazem    milliGRAM(s) Oral daily  enoxaparin Injectable 40 milliGRAM(s) SubCutaneous every 24 hours  lidocaine 1% Injectable 20 milliLiter(s) Local Injection once  losartan 100 milliGRAM(s) Oral daily  metroNIDAZOLE    Tablet 500 milliGRAM(s) Oral two times a day  nicotine - 21 mG/24Hr(s) Patch 1 Patch Transdermal daily  tiotropium 2.5 MICROgram(s) Inhaler 2 Puff(s) Inhalation daily  vancomycin  IVPB 1500 milliGRAM(s) IV Intermittent every 8 hours    MEDICATIONS  (PRN):  acetaminophen     Tablet .. 650 milliGRAM(s) Oral every 6 hours PRN Temp greater or equal to 38C (100.4F), Mild Pain (1 - 3)  albuterol/ipratropium for Nebulization 3 milliLiter(s) Nebulizer every 6 hours PRN Shortness of Breath  aluminum hydroxide/magnesium hydroxide/simethicone Suspension 30 milliLiter(s) Oral every 4 hours PRN Dyspepsia  hydrOXYzine hydrochloride 50 milliGRAM(s) Oral at bedtime PRN Anxiety  melatonin 3 milliGRAM(s) Oral at bedtime PRN Insomnia  nicotine  Polacrilex Lozenge 4 milliGRAM(s) Oral every 2 hours PRN craving/withdrawal  ondansetron Injectable 4 milliGRAM(s) IV Push every 8 hours PRN Nausea and/or Vomiting      Care Discussed with Consultants/Other Providers [x] YES  [ ] NO    Vital Signs Last 24 Hrs  T(C): 36.6 (19 Mar 2023 20:48), Max: 36.7 (19 Mar 2023 05:49)  T(F): 97.9 (19 Mar 2023 20:48), Max: 98.1 (19 Mar 2023 14:53)  HR: 78 (19 Mar 2023 20:48) (78 - 90)  BP: 176/92 (19 Mar 2023 20:48) (132/85 - 176/92)  BP(mean): --  RR: 18 (19 Mar 2023 20:48) (18 - 18)  SpO2: 96% (19 Mar 2023 20:48) (96% - 97%)    Parameters below as of 19 Mar 2023 20:48  Patient On (Oxygen Delivery Method): room air      I&O's Summary    18 Mar 2023 07:01  -  19 Mar 2023 07:00  --------------------------------------------------------  IN: 900 mL / OUT: 0 mL / NET: 900 mL    19 Mar 2023 07:01  -  19 Mar 2023 22:30  --------------------------------------------------------  IN: 1381 mL / OUT: 1001 mL / NET: 380 mL              PHYSICAL EXAM:  GENERAL: NAD, well-developed, comfortable  HEAD:  Atraumatic, Normocephalic  EYES: EOMI, PERRLA, conjunctiva and sclera clear  NECK: Supple, No JVD  CHEST/LUNG: mild decrease breath sounds bilaterally; No wheeze   HEART: Regular rate and rhythm; No murmurs, rubs, or gallops  ABDOMEN: Soft, Nontender, Nondistended; Bowel sounds present  NEURO: AAOx3, understands why he is admitted, able to move all four extremities spontaneously  Skin: left buttocks: erythematous, indurated, with shallow ulceration (non bleeding/draining) about 4-5 cm, indurated, no fluctuance noted. LLE calf: non healing bleeding wound about 2cm diameter, no superimposed infection

## 2023-03-19 NOTE — PROGRESS NOTE ADULT - ASSESSMENT
57 year old with schizophrenia presents with a change in behavior.  Noted to have a left gluteal wound.    Plan:   - Remove packing  - Wound care consult  - no clinical evidence of fistula  - dressing changes  - rest of care per primary  -c/w abx    B Team Surgery  [id number]  57 year old with schizophrenia presents with a change in behavior.  Noted to have a left gluteal wound.    Plan:   - Remove packing  - Wound care consult  - no clinical evidence of fistula  - dressing changes  - rest of care per primary  -c/w abx  Please reconsult as needed    B Team Surgery  [id number]

## 2023-03-19 NOTE — PROVIDER CONTACT NOTE (CHANGE IN STATUS NOTIFICATION) - ASSESSMENT
no sxs of acute distress; pt afebrile, /100 rechecked bp 160/106 pt asymptomatic  pt 02 sat 97 on 2L NC

## 2023-03-20 LAB
ALBUMIN SERPL ELPH-MCNC: 3.1 G/DL — LOW (ref 3.3–5)
ALP SERPL-CCNC: 121 U/L — HIGH (ref 40–120)
ALT FLD-CCNC: 58 U/L — HIGH (ref 4–41)
ANION GAP SERPL CALC-SCNC: 9 MMOL/L — SIGNIFICANT CHANGE UP (ref 7–14)
AST SERPL-CCNC: 36 U/L — SIGNIFICANT CHANGE UP (ref 4–40)
BASOPHILS # BLD AUTO: 0.05 K/UL — SIGNIFICANT CHANGE UP (ref 0–0.2)
BASOPHILS NFR BLD AUTO: 0.6 % — SIGNIFICANT CHANGE UP (ref 0–2)
BILIRUB SERPL-MCNC: <0.2 MG/DL — SIGNIFICANT CHANGE UP (ref 0.2–1.2)
BUN SERPL-MCNC: 13 MG/DL — SIGNIFICANT CHANGE UP (ref 7–23)
CALCIUM SERPL-MCNC: 9 MG/DL — SIGNIFICANT CHANGE UP (ref 8.4–10.5)
CHLORIDE SERPL-SCNC: 90 MMOL/L — LOW (ref 98–107)
CK SERPL-CCNC: 84 U/L — SIGNIFICANT CHANGE UP (ref 30–200)
CO2 SERPL-SCNC: 27 MMOL/L — SIGNIFICANT CHANGE UP (ref 22–31)
CREAT SERPL-MCNC: 0.63 MG/DL — SIGNIFICANT CHANGE UP (ref 0.5–1.3)
CULTURE RESULTS: SIGNIFICANT CHANGE UP
CULTURE RESULTS: SIGNIFICANT CHANGE UP
EGFR: 112 ML/MIN/1.73M2 — SIGNIFICANT CHANGE UP
EOSINOPHIL # BLD AUTO: 0.25 K/UL — SIGNIFICANT CHANGE UP (ref 0–0.5)
EOSINOPHIL NFR BLD AUTO: 2.9 % — SIGNIFICANT CHANGE UP (ref 0–6)
GLUCOSE SERPL-MCNC: 130 MG/DL — HIGH (ref 70–99)
HCT VFR BLD CALC: 41.1 % — SIGNIFICANT CHANGE UP (ref 39–50)
HGB BLD-MCNC: 13.4 G/DL — SIGNIFICANT CHANGE UP (ref 13–17)
IANC: 6.56 K/UL — SIGNIFICANT CHANGE UP (ref 1.8–7.4)
IMM GRANULOCYTES NFR BLD AUTO: 1.5 % — HIGH (ref 0–0.9)
LYMPHOCYTES # BLD AUTO: 0.82 K/UL — LOW (ref 1–3.3)
LYMPHOCYTES # BLD AUTO: 9.6 % — LOW (ref 13–44)
MAGNESIUM SERPL-MCNC: 1.9 MG/DL — SIGNIFICANT CHANGE UP (ref 1.6–2.6)
MCHC RBC-ENTMCNC: 26.1 PG — LOW (ref 27–34)
MCHC RBC-ENTMCNC: 32.6 GM/DL — SIGNIFICANT CHANGE UP (ref 32–36)
MCV RBC AUTO: 80 FL — SIGNIFICANT CHANGE UP (ref 80–100)
MONOCYTES # BLD AUTO: 0.69 K/UL — SIGNIFICANT CHANGE UP (ref 0–0.9)
MONOCYTES NFR BLD AUTO: 8.1 % — SIGNIFICANT CHANGE UP (ref 2–14)
NEUTROPHILS # BLD AUTO: 6.56 K/UL — SIGNIFICANT CHANGE UP (ref 1.8–7.4)
NEUTROPHILS NFR BLD AUTO: 77.3 % — HIGH (ref 43–77)
NRBC # BLD: 0 /100 WBCS — SIGNIFICANT CHANGE UP (ref 0–0)
NRBC # FLD: 0 K/UL — SIGNIFICANT CHANGE UP (ref 0–0)
PHOSPHATE SERPL-MCNC: 4.1 MG/DL — SIGNIFICANT CHANGE UP (ref 2.5–4.5)
PLATELET # BLD AUTO: 190 K/UL — SIGNIFICANT CHANGE UP (ref 150–400)
POTASSIUM SERPL-MCNC: 4.9 MMOL/L — SIGNIFICANT CHANGE UP (ref 3.5–5.3)
POTASSIUM SERPL-SCNC: 4.9 MMOL/L — SIGNIFICANT CHANGE UP (ref 3.5–5.3)
PROT SERPL-MCNC: 6 G/DL — SIGNIFICANT CHANGE UP (ref 6–8.3)
RBC # BLD: 5.14 M/UL — SIGNIFICANT CHANGE UP (ref 4.2–5.8)
RBC # FLD: 15.9 % — HIGH (ref 10.3–14.5)
SODIUM SERPL-SCNC: 126 MMOL/L — LOW (ref 135–145)
[facility] SOURCE: SIGNIFICANT CHANGE UP
[facility] SOURCE: SIGNIFICANT CHANGE UP
VANCOMYCIN TROUGH SERPL-MCNC: <4 UG/ML — LOW (ref 10–20)
WBC # BLD: 8.5 K/UL — SIGNIFICANT CHANGE UP (ref 3.8–10.5)
WBC # FLD AUTO: 8.5 K/UL — SIGNIFICANT CHANGE UP (ref 3.8–10.5)

## 2023-03-20 PROCEDURE — 99223 1ST HOSP IP/OBS HIGH 75: CPT

## 2023-03-20 PROCEDURE — 99232 SBSQ HOSP IP/OBS MODERATE 35: CPT

## 2023-03-20 PROCEDURE — 93306 TTE W/DOPPLER COMPLETE: CPT | Mod: 26

## 2023-03-20 RX ORDER — QUETIAPINE FUMARATE 200 MG/1
50 TABLET, FILM COATED ORAL ONCE
Refills: 0 | Status: COMPLETED | OUTPATIENT
Start: 2023-03-20 | End: 2023-03-20

## 2023-03-20 RX ORDER — SODIUM HYPOCHLORITE 0.125 %
1 SOLUTION, NON-ORAL MISCELLANEOUS
Refills: 0 | Status: DISCONTINUED | OUTPATIENT
Start: 2023-03-20 | End: 2023-03-29

## 2023-03-20 RX ADMIN — Medication 1 PATCH: at 06:53

## 2023-03-20 RX ADMIN — Medication 2000 UNIT(S): at 12:06

## 2023-03-20 RX ADMIN — Medication 300 MILLIGRAM(S): at 15:32

## 2023-03-20 RX ADMIN — Medication 1 APPLICATION(S): at 05:23

## 2023-03-20 RX ADMIN — Medication 300 MILLIGRAM(S): at 05:23

## 2023-03-20 RX ADMIN — LOSARTAN POTASSIUM 100 MILLIGRAM(S): 100 TABLET, FILM COATED ORAL at 05:22

## 2023-03-20 RX ADMIN — Medication 500 MILLIGRAM(S): at 18:54

## 2023-03-20 RX ADMIN — TIOTROPIUM BROMIDE 2 PUFF(S): 18 CAPSULE ORAL; RESPIRATORY (INHALATION) at 10:07

## 2023-03-20 RX ADMIN — QUETIAPINE FUMARATE 50 MILLIGRAM(S): 200 TABLET, FILM COATED ORAL at 00:16

## 2023-03-20 RX ADMIN — ALBUTEROL 2 PUFF(S): 90 AEROSOL, METERED ORAL at 18:53

## 2023-03-20 RX ADMIN — ALBUTEROL 2 PUFF(S): 90 AEROSOL, METERED ORAL at 10:08

## 2023-03-20 RX ADMIN — Medication 1 PATCH: at 11:55

## 2023-03-20 RX ADMIN — CEFEPIME 100 MILLIGRAM(S): 1 INJECTION, POWDER, FOR SOLUTION INTRAMUSCULAR; INTRAVENOUS at 14:56

## 2023-03-20 RX ADMIN — Medication 500 MILLIGRAM(S): at 05:22

## 2023-03-20 RX ADMIN — ENOXAPARIN SODIUM 40 MILLIGRAM(S): 100 INJECTION SUBCUTANEOUS at 18:54

## 2023-03-20 RX ADMIN — Medication 300 MILLIGRAM(S): at 23:34

## 2023-03-20 RX ADMIN — ALBUTEROL 2 PUFF(S): 90 AEROSOL, METERED ORAL at 22:46

## 2023-03-20 RX ADMIN — ALBUTEROL 2 PUFF(S): 90 AEROSOL, METERED ORAL at 05:22

## 2023-03-20 RX ADMIN — CEFEPIME 100 MILLIGRAM(S): 1 INJECTION, POWDER, FOR SOLUTION INTRAMUSCULAR; INTRAVENOUS at 03:39

## 2023-03-20 NOTE — CHART NOTE - NSCHARTNOTEFT_GEN_A_CORE
chrissy SILVER called on pt for agitation and pt wanting to leave AMA. pt redirected and agreed to eat and rest in bed until morning. will continue to monitor overnight.    Sury Tanner, [facility]  Medicine [id number] chrissy SILVER called on pt for agitation and pt wanting to leave AMA. pt redirected and agreed to eat and rest in bed until morning. MAR recommended 50mg Seroquel. will continue to monitor overnight.    Sury Tanner, [facility]  Medicine [id number]
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
## 2023-03-20 NOTE — CONSULT NOTE ADULT - ASSESSMENT
Assessment/Plan: 58 y/o M with hx of multiple recent admissions of LLE cellulitis, hypogammaglobulinemia (on monthly IVIG), schizoaffective d/o (on haldol and Paxil) who was brought in by sister for bizarre behavior/delusional thinking, found to have significant cellulitis of the left buttocks with ulceration. no signs of nec fascitis at this time and no evidence of drainable abscess. Behavioral health following patient for agitation and psychosis prior to hospitalization. Patient with code ADRIANNE earlier for agitation and pt wanting to leave AMA.     Wound Consult requested to assist w/ management of left buttock wound s/p I&D by general surgery on 3/17 to the level of subcutaneous tissue, as per general surgery notes no obvious fistula. Patient known to wound care surgical team, last seen on 2/6/23 for chronic left leg wound. A that time Medihoney and Foam was recommended. Also recommended to f/u with reported pathology from left leg chronic wound. Otherwise it was recommended patient can get left leg bx outpatient.      Sacrum to left buttock full thickness wound, abscess s/p I&D by general surgery on 3/17  -CT of the ABD and pelvis: Left buttock cellulitis without evidence of abscess. Consider MRI to evaluate for perianal fistula. Remainder of findings as per primary team   -Deep wound with tunneling exposing, wound predominantly exposing adhere, moist necrotic slough and some viable adipose tissue, tunneling extends 7.5cm towards left perirectum   -DDX unlikely pressure as patient ambulates, could be moisture related complicated by patient scratching wound? less likely malignant wound as wound is acute vs. atypical wound   -Recommend dermatology consult during inpatient, patient describes it as a " lump" initially.   -NO crepitus, no fluctuance, low concern for Necrotizing fascitis   -Monitor for tissue type changes   -+ Oozing sanguinous drainage from tunneling, able to achieve hemostasis once wound was packed.   -Patient with severe pain when probe to tunneling   -+ surrounding cellulitis as evident by induration, erythema. + tenderness.   -No purulence expressed during skin assessment, no wound culture obtained. Can obtain wound culture if requested.   -Patient receiving Flagyl, cefepime and vancomycin as per ID.   -BC + MRSA-Management as per primary team   -Sacrum to left buttock wound of unknown etiology patient endorses he had a " lump" and he was scratching it and it " open"  -Afebrile WBC wnl  -ID following   -No obvious fistula. No BM during skin assessment.   -Patient becoming increasingly agitated threatening  to leave the hospital AMA, patient unable to be reoriented. Comfort measures provided.   -Monitor for tissue type changes.   -No drainable abscess  -No sharp debridement indicated today for risk of bleeding and adhere slough   -Topical Recommendations: Sacrum to left upper buttock wound:  Clean wound and periwound skin with NS. Pat dry. Apply liquid barrier film to periwound skin, Lightly pack depth and dead space with Dakins 1/4 strength wet to dry using delma, leave 2 inches out to wick, cover with dry gauze and ABD pad. Secure with paper tape or Tegaderm. Change twice a day or PRN.   -Lower buttocks and periwound skin: after dressing change, apply Maverick barrier moisture cream to entire area. Apply twice a day or PRN.   -Please coordinate pain medicine prior to dressing change, patient endorses pain with wound packing. Please assure proper packing of wound.   -Encourage patient to turn and position, once turned place large Z fluidized positioning device to offload. Turned and position as per hospital protocol  -Seat cushion if patient is out of bed to the chair.    Left leg chronic wound, atypical   -Prev treated for cellulitis   -+smoker   -as per patient present for over 30 years   -No associated cellulitis   -No overt signs of ischemia   - No edema to bilateral legs   -Previous biopsy in outside hospital? Unable to obtain results   -Doubt patient would be able to f/u outpatient given know poor compliance and previously left AMA documented left AMA from [facility].    -Recommend dermatology to eval while inpatient   -Topical Recommendations: Clean with NS. Pat dry. Apply Betadine wipe, allow to dry. Cover with dry 4x4 guaze and Kerlix. Change daily.     MRSA Bacteremia   -Management as per primary team       Hyperglycemia - 6.3% (Feb 05 2023)  -Management as per primary team     Additional recommendations   Bilateral heels and great toes dry thicken skin/callus: Clean with soap and water. Pat dry. Apply ATRACT TAIN moisture cream, apply twice a day. Avoid between toes  Pending Nutrition Consult for optimization as tolerated in pt w/ Protein Calorie Malnutrition        consider MVI & Vit C to promote wound healing        encourage high quality protein when appropriate    Continue turning and positioning w/ offloading assistive devices as per protocol  Continue w/ Pericare as per protocol  Waffle Cushion to chair when oob to chair  Continue w/ low air loss fluidized bed surface     Care as per medicine, will follow w/ you. Please reconsult earlier if needed it.     Upon discharge f/u as outpatient at [facility] [address] [place] [address] [phone]  Seen w/ tania Morrow and D/w team    Thank you for this consult  Mary Thomas, MADELAINE-BC, CWOCN (pager #[phone] or available in MS Teams)    If after 4PM or before 7:30AM on Mon-Friday or weekend/holiday please contact general surgery for urgent matters.   Team A- [id number]   Team B- [id number]  For non-urgent matters e-mail [email or web address]    We spent 70 minutes face to face with this patient of which more than 50% of the time was spent counseling & coordinating care of this pt     Assessment/Plan: 58 y/o M with hx of multiple recent admissions of LLE cellulitis, hypogammaglobulinemia (on monthly IVIG), schizoaffective d/o (on haldol and Paxil) who was brought in by sister for bizarre behavior/delusional thinking, found to have significant cellulitis of the left buttocks with ulceration. no signs of nec fascitis at this time and no evidence of drainable abscess. Behavioral health following patient for agitation and psychosis prior to hospitalization. Patient with code ADRIANNE earlier for agitation and pt wanting to leave AMA.     Wound Consult requested to assist w/ management of left buttock wound s/p I&D by general surgery on 3/17 to the level of subcutaneous tissue, as per general surgery notes no obvious fistula. Patient known to wound care surgical team, last seen on 2/6/23 for chronic left leg wound. A that time Medihoney and Foam was recommended. Also recommended to f/u with reported pathology from left leg chronic wound. Otherwise it was recommended patient can get left leg bx outpatient.      Sacrum to left buttock full thickness wound, abscess s/p I&D by general surgery on 3/17  -CT of the ABD and pelvis: Left buttock cellulitis without evidence of abscess. Consider MRI to evaluate for perianal fistula. Remainder of findings as per primary team   -Deep wound with tunneling exposing, wound predominantly exposing adhere, moist necrotic slough and some viable adipose tissue, tunneling extends 7.5cm towards left perirectum   -DDX unlikely pressure as patient ambulates, could be moisture related complicated by patient scratching wound? less likely malignant wound as wound is acute vs. atypical wound   -Recommend dermatology consult during inpatient, patient describes it as a " lump" initially.   -NO crepitus, no fluctuance, low concern for Necrotizing fascitis   -Monitor for tissue type changes   -+ Oozing sanguinous drainage from tunneling, able to achieve hemostasis once wound was packed.   -Patient with severe pain when probe to tunneling   -+ surrounding cellulitis as evident by induration, erythema. + tenderness.   -No purulence expressed during skin assessment, no wound culture obtained. Can obtain wound culture if requested.   -Patient receiving Flagyl, cefepime and vancomycin as per ID.   -BC + MRSA-Management as per primary team   -Sacrum to left buttock wound of unknown etiology patient endorses he had a " lump" and he was scratching it and it " open"  -Afebrile WBC wnl  -ID following   -No obvious fistula. No BM during skin assessment.   -Patient becoming increasingly agitated threatening  to leave the hospital AMA, patient unable to be reoriented. Comfort measures provided.   -Monitor for tissue type changes.   -No drainable abscess  -No sharp debridement indicated today for risk of bleeding and adhere slough   -Topical Recommendations: Sacrum to left upper buttock wound:  Clean wound and periwound skin with NS. Pat dry. Apply liquid barrier film to periwound skin, Lightly pack depth and dead space with Dakins 1/4 strength wet to dry using delma, leave 2 inches out to wick, cover with dry gauze and ABD pad. Secure with paper tape or Tegaderm. Change twice a day or PRN.   -Lower buttocks and periwound skin: after dressing change, apply Maverick barrier moisture cream to entire area. Apply twice a day or PRN.   -Please coordinate pain medicine prior to dressing change, patient endorses pain with wound packing. Please assure proper packing of wound.   -Encourage patient to turn and position, once turned place large Z fluidized positioning device to offload. Turned and position as per hospital protocol  -Seat cushion if patient is out of bed to the chair.    Left leg chronic wound, atypical   -Prev treated for cellulitis   -+smoker   -as per patient present for over 30 years   -No associated cellulitis   -No overt signs of ischemia   - No edema to bilateral legs   -Previous biopsy in outside hospital? Unable to obtain results   -Doubt patient would be able to f/u outpatient given know poor compliance and previously left AMA documented left AMA from [facility].    -Recommend dermatology to eval while inpatient   -Topical Recommendations: Clean with NS. Pat dry. Apply Betadine wipe, allow to dry. Cover with dry 4x4 guaze and Kerlix. Change daily.     MRSA Bacteremia   -Management as per primary team       Hyperglycemia - 6.3% (Feb 05 2023)  -Management as per primary team     Additional recommendations   Bilateral heels and great toes dry thicken skin/callus: Clean with soap and water. Pat dry. Apply ATRACT TAIN moisture cream, apply twice a day. Avoid between toes  Pending Nutrition Consult for optimization as tolerated in pt w/ Protein Calorie Malnutrition        consider MVI & Vit C to promote wound healing        encourage high quality protein when appropriate    Continue turning and positioning w/ offloading assistive devices as per protocol  Continue w/ Pericare as per protocol  Waffle Cushion to chair when oob to chair  Continue w/ low air loss fluidized bed surface     Care as per medicine, will follow w/ you. Please reconsult earlier if needed it.     Upon discharge f/u as outpatient at [facility] [address] [place] [address] [phone]  Seen w/ tania Morrow and D/w team    Thank you for this consult  Mary Thomas, MADELAINE-BC, CWOCN (pager #[phone] or available in MS Teams)    If after 4PM or before 7:30AM on Mon-Friday or weekend/holiday please contact general surgery for urgent matters.   Team A- [id number]   Team B- [id number]  For non-urgent matters e-mail [email or web address]    We spent 75 minutes face to face with this patient of which more than 50% of the time was spent counseling & coordinating care of this pt

## 2023-03-20 NOTE — PROGRESS NOTE ADULT - SUBJECTIVE AND OBJECTIVE BOX
SUBJECTIVE/ OVERNIGHT EVENTS:  feels well.   overnight behavior RRT due to agitations  he wanted to go home.   no cp, no sob, no n/v/d. no abdominal pain.  no headache, no dizziness.   remain on IV abx       --------------------------------------------------------------------------------------------  LABS:                        13.4   8.50  )-----------( 190      ( 20 Mar 2023 12:40 )             41.1     03-20    126<L>  |  90<L>  |  13  ----------------------------<  130<H>  4.9   |  27  |  0.63    Ca    9.0      20 Mar 2023 12:40  Phos  4.1     03-20  Mg     1.90     03-20    TPro  6.0  /  Alb  3.1<L>  /  TBili  <0.2  /  DBili  x   /  AST  36  /  ALT  58<H>  /  AlkPhos  121<H>  03-20      CAPILLARY BLOOD GLUCOSE      POCT Blood Glucose.: 126 mg/dL (20 Mar 2023 12:28)  POCT Blood Glucose.: 160 mg/dL (20 Mar 2023 07:07)  POCT Blood Glucose.: 158 mg/dL (19 Mar 2023 21:46)  POCT Blood Glucose.: 87 mg/dL (19 Mar 2023 16:55)    CARDIAC MARKERS ( 20 Mar 2023 12:40 )  x     / x     / 84 U/L / x     / x              RADIOLOGY & ADDITIONAL TESTS:    Imaging Personally Reviewed:  [x] YES  [ ] NO    Consultant(s) Notes Reviewed:  [x] YES  [ ] NO    MEDICATIONS  (STANDING):  albuterol    90 MICROgram(s) HFA Inhaler 2 Puff(s) Inhalation every 6 hours  cefepime   IVPB 2000 milliGRAM(s) IV Intermittent every 12 hours  cholecalciferol 2000 Unit(s) Oral daily  clobetasol 0.05% Ointment 1 Application(s) Topical two times a day  Dakins Solution - 1/4 Strength 1 Application(s) Topical two times a day  diltiazem    milliGRAM(s) Oral daily  enoxaparin Injectable 40 milliGRAM(s) SubCutaneous every 24 hours  lidocaine 1% Injectable 20 milliLiter(s) Local Injection once  losartan 100 milliGRAM(s) Oral daily  metroNIDAZOLE    Tablet 500 milliGRAM(s) Oral two times a day  nicotine - 21 mG/24Hr(s) Patch 1 Patch Transdermal daily  tiotropium 2.5 MICROgram(s) Inhaler 2 Puff(s) Inhalation daily  vancomycin  IVPB 1500 milliGRAM(s) IV Intermittent every 8 hours    MEDICATIONS  (PRN):  acetaminophen     Tablet .. 650 milliGRAM(s) Oral every 6 hours PRN Temp greater or equal to 38C (100.4F), Mild Pain (1 - 3)  albuterol/ipratropium for Nebulization 3 milliLiter(s) Nebulizer every 6 hours PRN Shortness of Breath  aluminum hydroxide/magnesium hydroxide/simethicone Suspension 30 milliLiter(s) Oral every 4 hours PRN Dyspepsia  hydrOXYzine hydrochloride 50 milliGRAM(s) Oral at bedtime PRN Anxiety  melatonin 3 milliGRAM(s) Oral at bedtime PRN Insomnia  nicotine  Polacrilex Lozenge 4 milliGRAM(s) Oral every 2 hours PRN craving/withdrawal  ondansetron Injectable 4 milliGRAM(s) IV Push every 8 hours PRN Nausea and/or Vomiting      Care Discussed with Consultants/Other Providers [x] YES  [ ] NO    Vital Signs Last 24 Hrs  T(C): 36.7 (20 Mar 2023 05:23), Max: 36.7 (20 Mar 2023 05:23)  T(F): 98 (20 Mar 2023 05:23), Max: 98 (20 Mar 2023 05:23)  HR: 86 (20 Mar 2023 05:23) (78 - 90)  BP: 135/90 (20 Mar 2023 05:23) (132/85 - 176/92)  BP(mean): --  RR: 18 (20 Mar 2023 05:23) (18 - 18)  SpO2: 96% (20 Mar 2023 05:23) (96% - 96%)    Parameters below as of 20 Mar 2023 05:23  Patient On (Oxygen Delivery Method): room air      I&O's Summary    19 Mar 2023 07:01  -  20 Mar 2023 07:00  --------------------------------------------------------  IN: 1381 mL / OUT: 1001 mL / NET: 380 mL        PHYSICAL EXAM:  GENERAL: NAD, well-developed, comfortable  HEAD:  Atraumatic, Normocephalic  EYES: EOMI, PERRLA, conjunctiva and sclera clear  NECK: Supple, No JVD  CHEST/LUNG: mild decrease breath sounds bilaterally; No wheeze   HEART: Regular rate and rhythm; No murmurs, rubs, or gallops  ABDOMEN: Soft, Nontender, Nondistended; Bowel sounds present  NEURO: AAOx3, understands why he is admitted, able to move all four extremities spontaneously  Skin: left buttocks: erythematous, indurated, with shallow ulceration (non bleeding/draining) about 4-5 cm, indurated, no fluctuance noted. LLE calf: non healing bleeding wound about 2cm diameter, no superimposed infection

## 2023-03-20 NOTE — CHART NOTE - NSCHARTNOTEFT_GEN_A_CORE
Late Entry:     Notified by RN earlier this AM (around 10:45am) that pt was found in the elevators outside the unit stating he wanted to go home.   Rn was able to redirect patient back to room.   Writer came to assess patient. Patient found sleeping calmly in bed.   Discussed with patient that he needs to stay in hospital for IV antibiotics due to cellulitis and MRSA bacteremia.   Patient agreed to remain admitted, will continue to monitor.

## 2023-03-20 NOTE — PROGRESS NOTE ADULT - ASSESSMENT
57 year old with schizophrenia, hypogammaglobulinemia on IVIG, presents with a change in behavior  Noted to have a left gluteal wound  Advised that at [facility] (OSH) patient was found to have MRSA Bacteremia 3/12  3/15 BCXs NGTD  Source MRSA bacteremia--buttock abscess/wound?    1) Left Buttock wound  Imaging and history raise concern for an underlying fistula  Appreciate surgery input and I+D procedure; F/U surgery  Would obtain wound culture from site if any purulent drainage  Vanco 1500mg q 8, monitor levels  Continue cefepime/ add flagyl 500 mg po q 12  Wound care eval    2) MRSA Bacteremia  - OSH positive BCX as above  - Continue Vanco (monitor levels)  - TTE generally reassuring, hold on LINDA for now  - F/U pending bCXs  - May be challenging disposition given underlying psych concerns/reliability    3) Tranaminitis  Check acute hepatitis panel  Trend    4) CVID  Check immuoglobulins  On imunoglobulin treatment monthly    Josr Downs MD  Contact on TEAMS messaging from 9am - 5pm  From 5pm-9am, on weekends, or if no response call [phone]

## 2023-03-20 NOTE — PROGRESS NOTE ADULT - SUBJECTIVE AND OBJECTIVE BOX
CC: F/U for Bacteremia    Saw/spoke to patient. Unchanged. No new complaints. Asking to leave.    Allergies  amoxicillin (Fever)  penicillin (Rash)    ANTIMICROBIALS:  cefepime   IVPB 2000 every 12 hours  metroNIDAZOLE    Tablet 500 two times a day  vancomycin  IVPB 1500 every 8 hours    PE:    Vital Signs Last 24 Hrs  T(C): 36.6 (20 Mar 2023 16:46), Max: 36.7 (20 Mar 2023 05:23)  T(F): 97.9 (20 Mar 2023 16:46), Max: 98 (20 Mar 2023 05:23)  HR: 82 (20 Mar 2023 16:46) (78 - 90)  BP: 164/97 (20 Mar 2023 16:46) (132/85 - 176/92)  RR: 18 (20 Mar 2023 16:46) (18 - 18)  SpO2: 96% (20 Mar 2023 16:46) (96% - 96%)    Gen: AOx3, NAD, non-toxic  CV: Nontachycardic  Resp: Breathing comfortably, RA  Abd: Soft, nontender  IV/Skin: No thrombophlebitis    LABS:                        13.4   8.50  )-----------( 190      ( 20 Mar 2023 12:40 )             41.1     03-20    126<L>  |  90<L>  |  13  ----------------------------<  130<H>  4.9   |  27  |  0.63    Ca    9.0      20 Mar 2023 12:40  Phos  4.1     03-20  Mg     1.90     03-20    TPro  6.0  /  Alb  3.1<L>  /  TBili  <0.2  /  DBili  x   /  AST  36  /  ALT  58<H>  /  AlkPhos  121<H>  03-20    MICROBIOLOGY:  Vancomycin Level, Trough: <4.0 ug/mL (03-20-23 @ 12:40)    Clean Catch Clean Catch (Midstream)  03-15-23   No growth  --  --    .Blood Blood-Peripheral  03-15-23   No Growth Final  --  --    .Blood Blood-Peripheral  03-15-23   No Growth Final  --  --    RADIOLOGY:    3/15 CT:      IMPRESSION:  Left buttock cellulitis without evidence of abscess. Consider MRI to   evaluate for perianal fistula.

## 2023-03-20 NOTE — CHART NOTE - NSCHARTNOTEFT_GEN_A_CORE
Sister Brittany ( [phone]) updated by writer via telephone.  Discussed with Brittany that patient needs to stay admitted for bacteremia and cellulitis   Sister agreed with medical plan but expressed concern about patient attempting to leave AMA.   Writer discussed with sister that patient is redirectable and would benefit from a locked unit so he cannot escape. Will call bed board and ask for patient to be moved to 7s, which is a locked unit.   Sister Brittany also expressed concerns about patient caring for himself at home. Sister states he found his apartment in an unclean and chaotic state. Also states she found pot on stove with gas on when Garrett was not home. F/u SW. Late entry:    Sister Brittany ( [phone]) updated by writer via telephone.  Discussed with Brittany that patient needs to stay admitted for bacteremia and cellulitis   Sister agreed with medical plan but expressed concern about patient attempting to leave AMA.   Writer discussed with sister that patient is redirectable and would benefit from a locked unit so he cannot escape. Writer contacted bed board. Patient will be moved to 7s.   Sister Brittany also expressed concerns about patient caring for himself at home. Sister states he found his apartment in an unclean and chaotic state. Also states she found pot on stove with gas on when Garrett was not home. F/u SW.

## 2023-03-20 NOTE — PROGRESS NOTE ADULT - NSPROGADDITIONALINFOA_GEN_ALL_CORE
d/w pt and PA.  on observation due to elope risk  wound care follow up appreciated.     - [facility] JA[facility] Morales [facility]  - [phone]

## 2023-03-20 NOTE — CHART NOTE - NSCHARTNOTEFT_GEN_A_CORE
chrissy ADRIANNE called for pt, as he was agitated at the nurses station, wanting to go home. pt redirected and plan of care explained. no meds administered. pt moved to 7S. handoff provided to ACP.    Sury Tanner, [facility]  Medicine [id number]

## 2023-03-21 DIAGNOSIS — E87.1 HYPO-OSMOLALITY AND HYPONATREMIA: ICD-10-CM

## 2023-03-21 LAB
ANION GAP SERPL CALC-SCNC: 8 MMOL/L — SIGNIFICANT CHANGE UP (ref 7–14)
BUN SERPL-MCNC: 12 MG/DL — SIGNIFICANT CHANGE UP (ref 7–23)
CALCIUM SERPL-MCNC: 9.6 MG/DL — SIGNIFICANT CHANGE UP (ref 8.4–10.5)
CHLORIDE SERPL-SCNC: 89 MMOL/L — LOW (ref 98–107)
CO2 SERPL-SCNC: 33 MMOL/L — HIGH (ref 22–31)
CREAT SERPL-MCNC: 0.66 MG/DL — SIGNIFICANT CHANGE UP (ref 0.5–1.3)
EGFR: 111 ML/MIN/1.73M2 — SIGNIFICANT CHANGE UP
GLUCOSE SERPL-MCNC: 114 MG/DL — HIGH (ref 70–99)
HCT VFR BLD CALC: 44.8 % — SIGNIFICANT CHANGE UP (ref 39–50)
HGB BLD-MCNC: 14.2 G/DL — SIGNIFICANT CHANGE UP (ref 13–17)
MAGNESIUM SERPL-MCNC: 1.8 MG/DL — SIGNIFICANT CHANGE UP (ref 1.6–2.6)
MCHC RBC-ENTMCNC: 25.5 PG — LOW (ref 27–34)
MCHC RBC-ENTMCNC: 31.7 GM/DL — LOW (ref 32–36)
MCV RBC AUTO: 80.4 FL — SIGNIFICANT CHANGE UP (ref 80–100)
NRBC # BLD: 0 /100 WBCS — SIGNIFICANT CHANGE UP (ref 0–0)
NRBC # FLD: 0 K/UL — SIGNIFICANT CHANGE UP (ref 0–0)
PHOSPHATE SERPL-MCNC: 4.7 MG/DL — HIGH (ref 2.5–4.5)
PLATELET # BLD AUTO: 212 K/UL — SIGNIFICANT CHANGE UP (ref 150–400)
POTASSIUM SERPL-MCNC: 4.7 MMOL/L — SIGNIFICANT CHANGE UP (ref 3.5–5.3)
POTASSIUM SERPL-SCNC: 4.7 MMOL/L — SIGNIFICANT CHANGE UP (ref 3.5–5.3)
RBC # BLD: 5.57 M/UL — SIGNIFICANT CHANGE UP (ref 4.2–5.8)
RBC # FLD: 15.6 % — HIGH (ref 10.3–14.5)
SODIUM SERPL-SCNC: 130 MMOL/L — LOW (ref 135–145)
VANCOMYCIN TROUGH SERPL-MCNC: 13 UG/ML — SIGNIFICANT CHANGE UP (ref 10–20)
VIT B1 SERPL-MCNC: 124.2 NMOL/L — SIGNIFICANT CHANGE UP (ref 66.5–200)
WBC # BLD: 8.13 K/UL — SIGNIFICANT CHANGE UP (ref 3.8–10.5)
WBC # FLD AUTO: 8.13 K/UL — SIGNIFICANT CHANGE UP (ref 3.8–10.5)

## 2023-03-21 PROCEDURE — 88342 IMHCHEM/IMCYTCHM 1ST ANTB: CPT | Mod: 26

## 2023-03-21 PROCEDURE — 88312 SPECIAL STAINS GROUP 1: CPT | Mod: 26

## 2023-03-21 PROCEDURE — 99232 SBSQ HOSP IP/OBS MODERATE 35: CPT | Mod: FS,GC

## 2023-03-21 PROCEDURE — 88341 IMHCHEM/IMCYTCHM EA ADD ANTB: CPT | Mod: 26

## 2023-03-21 PROCEDURE — 99231 SBSQ HOSP IP/OBS SF/LOW 25: CPT

## 2023-03-21 PROCEDURE — 99232 SBSQ HOSP IP/OBS MODERATE 35: CPT

## 2023-03-21 PROCEDURE — 88305 TISSUE EXAM BY PATHOLOGIST: CPT | Mod: 26

## 2023-03-21 RX ORDER — ASCORBIC ACID 60 MG
500 TABLET,CHEWABLE ORAL DAILY
Refills: 0 | Status: DISCONTINUED | OUTPATIENT
Start: 2023-03-21 | End: 2023-03-29

## 2023-03-21 RX ADMIN — Medication 500 MILLIGRAM(S): at 17:11

## 2023-03-21 RX ADMIN — Medication 1 APPLICATION(S): at 17:11

## 2023-03-21 RX ADMIN — Medication 1 PATCH: at 11:44

## 2023-03-21 RX ADMIN — Medication 2000 UNIT(S): at 13:19

## 2023-03-21 RX ADMIN — ENOXAPARIN SODIUM 40 MILLIGRAM(S): 100 INJECTION SUBCUTANEOUS at 17:12

## 2023-03-21 RX ADMIN — CEFEPIME 100 MILLIGRAM(S): 1 INJECTION, POWDER, FOR SOLUTION INTRAMUSCULAR; INTRAVENOUS at 03:28

## 2023-03-21 RX ADMIN — LOSARTAN POTASSIUM 100 MILLIGRAM(S): 100 TABLET, FILM COATED ORAL at 05:38

## 2023-03-21 RX ADMIN — Medication 300 MILLIGRAM(S): at 14:54

## 2023-03-21 RX ADMIN — ALBUTEROL 2 PUFF(S): 90 AEROSOL, METERED ORAL at 21:48

## 2023-03-21 RX ADMIN — Medication 1 PATCH: at 13:09

## 2023-03-21 RX ADMIN — Medication 300 MILLIGRAM(S): at 05:42

## 2023-03-21 RX ADMIN — Medication 1 PATCH: at 17:10

## 2023-03-21 RX ADMIN — CEFEPIME 100 MILLIGRAM(S): 1 INJECTION, POWDER, FOR SOLUTION INTRAMUSCULAR; INTRAVENOUS at 13:08

## 2023-03-21 RX ADMIN — Medication 500 MILLIGRAM(S): at 05:38

## 2023-03-21 RX ADMIN — Medication 1 TABLET(S): at 13:18

## 2023-03-21 RX ADMIN — Medication 1 APPLICATION(S): at 05:54

## 2023-03-21 RX ADMIN — ALBUTEROL 2 PUFF(S): 90 AEROSOL, METERED ORAL at 10:29

## 2023-03-21 RX ADMIN — Medication 300 MILLIGRAM(S): at 21:48

## 2023-03-21 RX ADMIN — ALBUTEROL 2 PUFF(S): 90 AEROSOL, METERED ORAL at 16:54

## 2023-03-21 RX ADMIN — TIOTROPIUM BROMIDE 2 PUFF(S): 18 CAPSULE ORAL; RESPIRATORY (INHALATION) at 10:29

## 2023-03-21 RX ADMIN — Medication 1 PATCH: at 07:23

## 2023-03-21 RX ADMIN — ALBUTEROL 2 PUFF(S): 90 AEROSOL, METERED ORAL at 03:29

## 2023-03-21 RX ADMIN — Medication 500 MILLIGRAM(S): at 13:18

## 2023-03-21 NOTE — PROGRESS NOTE ADULT - ASSESSMENT
57 year old with schizophrenia, hypogammaglobulinemia on IVIG, presents with a change in behavior  Noted to have a left gluteal wound  Advised that at [facility] (OSH) patient was found to have MRSA Bacteremia 3/12  3/15 BCXs NGTD  Source MRSA bacteremia--buttock abscess/wound?    1) Left Buttock wound  Imaging and history raise concern for an underlying fistula  Appreciate surgery input and I+D procedure; F/U surgery  Would obtain wound culture from site if any purulent drainage  Vanco 1500mg q 8, monitor levels  Continue cefepime/ flagyl 500 mg po q 12  Wound care eval    2) MRSA Bacteremia  - OSH positive BCX as above  - Continue Vanco (monitor levels)  - TTE generally reassuring, hold on LINDA for now  - F/U pending bCXs  - May be challenging disposition given underlying psych concerns/reliability  - Anticipate will need treatment with 4 weeks IV abx given MRSA bacteremia    3) Tranaminitis  Check acute hepatitis panel  Trend    4) CVID  Check immuoglobulins  On imunoglobulin treatment monthly    Josr Downs MD  Contact on TEAMS messaging from 9am - 5pm  From 5pm-9am, on weekends, or if no response call [phone]

## 2023-03-21 NOTE — PROGRESS NOTE ADULT - SUBJECTIVE AND OBJECTIVE BOX
CC: F/U for Bacteremia    Saw/spoke to patient. No fevers, no chills. No new complaints.    Allergies  amoxicillin (Fever)  penicillin (Rash)    ANTIMICROBIALS:  cefepime   IVPB 2000 every 12 hours  metroNIDAZOLE    Tablet 500 two times a day  vancomycin  IVPB 1500 every 8 hours    PE:    Vital Signs Last 24 Hrs  T(C): 36.9 (21 Mar 2023 14:30), Max: 36.9 (21 Mar 2023 14:30)  T(F): 98.4 (21 Mar 2023 14:30), Max: 98.4 (21 Mar 2023 14:30)  HR: 75 (21 Mar 2023 14:30) (75 - 91)  BP: 155/78 (21 Mar 2023 14:30) (141/85 - 164/86)  RR: 17 (21 Mar 2023 14:30) (17 - 18)  SpO2: 97% (21 Mar 2023 14:30) (94% - 97%)    Gen: AOx3, NAD, non-toxic  CV: Nontachycardic  Resp: Breathing comfortably, RA  Abd: Soft, nontender  IV/Skin: No thrombophlebitis    LABS:                        14.2   8.13  )-----------( 212      ( 21 Mar 2023 11:10 )             44.8     03-21    130<L>  |  89<L>  |  12  ----------------------------<  114<H>  4.7   |  33<H>  |  0.66    Ca    9.6      21 Mar 2023 11:10  Phos  4.7     03-21  Mg     1.80     03-21    TPro  6.0  /  Alb  3.1<L>  /  TBili  <0.2  /  DBili  x   /  AST  36  /  ALT  58<H>  /  AlkPhos  121<H>  03-20    MICROBIOLOGY:  Vancomycin Level, Trough: 13.0 ug/mL (03-21-23 @ 11:10)    Clean Catch Clean Catch (Midstream)  03-15-23   No growth  --  --    .Blood Blood-Peripheral  03-15-23   No Growth Final  --  --    .Blood Blood-Peripheral  03-15-23   No Growth Final  --  --    RADIOLOGY:    3/15 CT:    IMPRESSION:  Left buttock cellulitis without evidence of abscess. Consider MRI to   evaluate for perianal fistula.

## 2023-03-21 NOTE — BH CONSULTATION LIAISON PROGRESS NOTE - NSBHCHARTREVIEWLAB_PSY_A_CORE FT
CBC Full  -  ( 21 Mar 2023 11:10 )  WBC Count : 8.13 K/uL  RBC Count : 5.57 M/uL  Hemoglobin : 14.2 g/dL  Hematocrit : 44.8 %  Platelet Count - Automated : 212 K/uL  Mean Cell Volume : 80.4 fL  Mean Cell Hemoglobin : 25.5 pg  Mean Cell Hemoglobin Concentration : 31.7 gm/dL  Auto Neutrophil # : x  Auto Lymphocyte # : x  Auto Monocyte # : x  Auto Eosinophil # : x  Auto Basophil # : x  Auto Neutrophil % : x  Auto Lymphocyte % : x  Auto Monocyte % : x  Auto Eosinophil % : x  Auto Basophil % : x  03-21    130<L>  |  89<L>  |  12  ----------------------------<  114<H>  4.7   |  33<H>  |  0.66    Ca    9.6      21 Mar 2023 11:10  Phos  4.7     03-21  Mg     1.80     03-21    TPro  6.0  /  Alb  3.1<L>  /  TBili  <0.2  /  DBili  x   /  AST  36  /  ALT  58<H>  /  AlkPhos  121<H>  03-20  
CBC Full  -  ( 17 Mar 2023 09:37 )  WBC Count : 11.10 K/uL  RBC Count : 4.93 M/uL  Hemoglobin : 12.3 g/dL  Hematocrit : 39.0 %  Platelet Count - Automated : 176 K/uL  Mean Cell Volume : 79.1 fL  Mean Cell Hemoglobin : 24.9 pg  Mean Cell Hemoglobin Concentration : 31.5 gm/dL  Auto Neutrophil # : 8.75 K/uL  Auto Lymphocyte # : 1.15 K/uL  Auto Monocyte # : 0.68 K/uL  Auto Eosinophil # : 0.23 K/uL  Auto Basophil # : 0.03 K/uL  Auto Neutrophil % : 78.8 %  Auto Lymphocyte % : 10.4 %  Auto Monocyte % : 6.1 %  Auto Eosinophil % : 2.1 %  Auto Basophil % : 0.3 %  03-17    123<L>  |  84<L>  |  8   ----------------------------<  93  4.8   |  34<H>  |  0.62    Ca    8.8      17 Mar 2023 06:30  Phos  2.8     03-17  Mg     1.80     03-17    TPro  5.8<L>  /  Alb  2.9<L>  /  TBili  0.2  /  DBili  x   /  AST  55<H>  /  ALT  61<H>  /  AlkPhos  132<H>  03-17

## 2023-03-21 NOTE — BH CONSULTATION LIAISON PROGRESS NOTE - NSBHASSESSMENTFT_PSY_ALL_CORE
56yoM domiciled in supportive housing TSI, SSD, single, PMH HTN, DM, hypogammaglobulinemia, PPHx schizoaffective d/o, bipolar d/o, hx of multiple psych hospitalizations (last known in [id number] [facility] [place]), denies hx SA/NSSIB, denies drug or alcohol use, denies legal hx, who BIB sister for bizarre behavior, admitted to medicine for significant cellulitis of the left buttocks with ulceration. Psychiatry consulted for psychosis/schizophrenia.    On assessment, patient appears to be calm and cooperative. He denies any mood, psychotic and manic symptoms. He was alert and oriented x3. Patient appears to have some decline in ability to care for self but does not seem to be stemming from depressive symptoms. Patient could be exhibiting some negative psychotic symptoms however pt with multiple medical factors that could be contributing to poor hygiene (not showering, chaotic apartment). Per sister, patient has been less hygienic and unable to make it to the bathroom. Differential include AMS from hypoxia vs infectious vs electrolyte derangement vs negative symptoms from schizophrenia vs physical restrictions impairing patient ability to be independent. Will gather collateral from psychiatrist, Dr. Cook for further information. Per Avatar, patient did received Haldol dec 150 mg on 2/15 at [facility] [place] clinic.    3/17: Patient is well, no note able signs of psychosis and denying all mood, psychotic or manic symptoms. Patient appears to have some concrete thought process but otherwise linear and rational. No concern for his safety. Recommend ordering Haldol dec 150 mg IM giving pt has qtc <500 and is due for this month. Discussed with team and concern for Paxil contributing to hyponatremia given past hospitalization with evidence for SIADH. Patient informed about Paxil being discontinued and he is agreeable.    3/21--- childish, can be impulsive at times- demanding to leave [facility]a. Struggles to understand reasons to stay-- has LE cellulitis, is on IV antibiotics. No mason psychosis noted.     Plan:  - Routine Obs, no SI  - Paxil on hold.  - Received Haldol dec 150 mg IM on 3/17/2023.   - Team to coordinate a safe d/c plan with sister Brittany

## 2023-03-21 NOTE — ADVANCED PRACTICE NURSE CONSULT - REASON FOR CONSULT
Patient seen on skin care rounds for skin assessment. Patient is a 56 y/o M with hx of multiple recent admissions of LLE cellulitis, hypogammaglobulinemia (on monthly IVIG), schizoaffective d/o (on haldol and Paxil) who was brought in by sister for bizarre behavior/delusional thinking, found to have significant cellulitis of the left buttocks with ulceration, consulted by infectious disease, s/p I&D by surgery 3/16, seen by wound MD 3/20. Chart reviewed: WBC 8.5, H/H 13.4/41.1, INR 1.03, Platelets 190, hA1c 6.3, BMI 38.3, manoj 19.

## 2023-03-21 NOTE — PROGRESS NOTE ADULT - SUBJECTIVE AND OBJECTIVE BOX
SUBJECTIVE/ OVERNIGHT EVENTS:  No overnight events.  No cp/sob/n/v/d.  No HA/dizziness.  No abdominal pain.   eager to go home  explained the treatment plan and pt seems to be okay.  psyc following.   derm consult per wound care recommendation.    --------------------------------------------------------------------------------------------  LABS:                        14.2   8.13  )-----------( 212      ( 21 Mar 2023 11:10 )             44.8     03-21    130<L>  |  89<L>  |  12  ----------------------------<  114<H>  4.7   |  33<H>  |  0.66    Ca    9.6      21 Mar 2023 11:10  Phos  4.7     03-21  Mg     1.80     03-21    TPro  6.0  /  Alb  3.1<L>  /  TBili  <0.2  /  DBili  x   /  AST  36  /  ALT  58<H>  /  AlkPhos  121<H>  03-20      CAPILLARY BLOOD GLUCOSE      POCT Blood Glucose.: 104 mg/dL (20 Mar 2023 21:20)  POCT Blood Glucose.: 113 mg/dL (20 Mar 2023 16:44)    CARDIAC MARKERS ( 20 Mar 2023 12:40 )  x     / x     / 84 U/L / x     / x              RADIOLOGY & ADDITIONAL TESTS:    Imaging Personally Reviewed:  [x] YES  [ ] NO    Consultant(s) Notes Reviewed:  [x] YES  [ ] NO    MEDICATIONS  (STANDING):  albuterol    90 MICROgram(s) HFA Inhaler 2 Puff(s) Inhalation every 6 hours  ascorbic acid 500 milliGRAM(s) Oral daily  cefepime   IVPB 2000 milliGRAM(s) IV Intermittent every 12 hours  cholecalciferol 2000 Unit(s) Oral daily  clobetasol 0.05% Ointment 1 Application(s) Topical two times a day  Dakins Solution - 1/4 Strength 1 Application(s) Topical two times a day  diltiazem    milliGRAM(s) Oral daily  enoxaparin Injectable 40 milliGRAM(s) SubCutaneous every 24 hours  losartan 100 milliGRAM(s) Oral daily  metroNIDAZOLE    Tablet 500 milliGRAM(s) Oral two times a day  multivitamin 1 Tablet(s) Oral daily  nicotine - 21 mG/24Hr(s) Patch 1 Patch Transdermal daily  tiotropium 2.5 MICROgram(s) Inhaler 2 Puff(s) Inhalation daily  vancomycin  IVPB 1500 milliGRAM(s) IV Intermittent every 8 hours    MEDICATIONS  (PRN):  acetaminophen     Tablet .. 650 milliGRAM(s) Oral every 6 hours PRN Temp greater or equal to 38C (100.4F), Mild Pain (1 - 3)  albuterol/ipratropium for Nebulization 3 milliLiter(s) Nebulizer every 6 hours PRN Shortness of Breath  aluminum hydroxide/magnesium hydroxide/simethicone Suspension 30 milliLiter(s) Oral every 4 hours PRN Dyspepsia  hydrOXYzine hydrochloride 50 milliGRAM(s) Oral at bedtime PRN Anxiety  melatonin 3 milliGRAM(s) Oral at bedtime PRN Insomnia  nicotine  Polacrilex Lozenge 4 milliGRAM(s) Oral every 2 hours PRN craving/withdrawal  ondansetron Injectable 4 milliGRAM(s) IV Push every 8 hours PRN Nausea and/or Vomiting      Care Discussed with Consultants/Other Providers [x] YES  [ ] NO    Vital Signs Last 24 Hrs  T(C): 36.7 (21 Mar 2023 04:45), Max: 36.8 (20 Mar 2023 20:27)  T(F): 98.1 (21 Mar 2023 04:45), Max: 98.3 (20 Mar 2023 20:27)  HR: 79 (21 Mar 2023 04:45) (79 - 91)  BP: 164/86 (21 Mar 2023 04:45) (141/85 - 164/97)  BP(mean): --  RR: 17 (21 Mar 2023 04:45) (17 - 18)  SpO2: 94% (21 Mar 2023 04:45) (94% - 96%)    Parameters below as of 21 Mar 2023 04:45  Patient On (Oxygen Delivery Method): room air      I&O's Summary    20 Mar 2023 07:01  -  21 Mar 2023 07:00  --------------------------------------------------------  IN: 600 mL / OUT: 0 mL / NET: 600 mL          PHYSICAL EXAM:  GENERAL: NAD, well-developed, comfortable  HEAD:  Atraumatic, Normocephalic  EYES: EOMI, PERRLA, conjunctiva and sclera clear  NECK: Supple, No JVD  CHEST/LUNG: mild decrease breath sounds bilaterally; No wheeze   HEART: Regular rate and rhythm; No murmurs, rubs, or gallops  ABDOMEN: Soft, Nontender, Nondistended; Bowel sounds present  NEURO: AAOx3, understands why he is admitted, able to move all four extremities spontaneously  Skin: left buttocks: erythematous, indurated, with shallow ulceration (non bleeding/draining) about 4-5 cm, indurated, no fluctuance noted. LLE calf: non healing bleeding wound about 2cm diameter, no superimposed infection

## 2023-03-21 NOTE — CHART NOTE - NSCHARTNOTEFT_GEN_A_CORE
Nutrition Consult X Assessment, Education    Source:   other [x] nurse, medical chart   Diet rx: Regular: Consistent Carbohydrate {Evening Snack} (CSTCHOSN)  800mL Fluid Restriction ([id number])  No Carb Prosource TF     Qty per Day:  1 (03-17-23 @ 18:00) [Active]    Pt's height: 74" (3/15)      IBW: 190#+/-10%      Pt's weight: 135.2 kg (3/16)   Pertinent Medications: Lovenox, Vit C, Multivitamin, Vit D3, Zofran (PRN),   Pertinent Labs: (3/21) Na 130 L, Cl 89 L, phosphorus 4.7 H, Glu 114 H  Skin:     Estimated Needs:   [ ] no change since previous assessment  [ ] recalculated:       Previous Nutrition Diagnosis:     [ ] Inadequate Energy Intake [ ]Inadequate Oral Intake [ ] Excessive Energy Intake     [ ] Underweight [ ] Increased Nutrient Needs [ ] Overweight/Obesity     [ ] Altered GI Function [ ] Unintended Weight Loss [ ] Food & Nutrition Related Knowledge Deficit [ ] Malnutrition      Nutrition Diagnosis is [ ] ongoing  [ ] resolved [ ] not applicable          New Nutrition Diagnosis: [ ] not applicable    [ ] Inadequate Protein Energy Intake   [ ]Inadequate Oral Intake   [ ] Excessive Energy Intake   [ ] Underweight   [ ] Increased Nutrient Needs   [ ] Overweight/Obesity   [ ] Altered GI Function   [ ] Unintended Weight Loss   [ ] Food & Nutrition Related Knowledge Deficit  [ ] Limited Adherence to nutrition related recommendations   [ ] Malnutrition    [ ] other:        Related to:     As evidenced by:     Interventions:     Recommend    [ ] Change Diet To:    [ ] Nutrition Supplement    [ ] Nutrition Support    [ ] Other:        Monitoring and Evaluation:     [ ] PO intake [ ] Tolerance to diet prescription [ ] weights [ ] follow up per protocol    [ ] other: Nutrition Consult X Assessment, Education    Source:   other [x] nurse, medical chart   Diet rx: Regular: Consistent Carbohydrate {Evening Snack} (CSTCHOSN) 800mL Fluid Restriction ([id number])  No Carb Prosource TF     Qty per Day:  1 (03-17-23 @ 18:00) [Active]    Pt 56 yo male with hx of multiple recent admissions with LLE cellulitis, hypogammaglobulinemia (on monthly IVIG), schizoaffective d/o brought in by sister for bizarre behavior/delusional thinking, found to have significant cellulitis of the left buttocks with ulceration; s/p I&D by surgery 3/16 - per chart review.     At time of visit, Pt asleep. Nurse requested RDN not to bother Pt at time of visit. Per nurse, Pt usually eats well; no chewing or swallowing difficulty; no nausea, vomiting or diarrhea @ this time. +BM (3/19) per flow sheet. Of note, Pt on 800 ml fluid restriction. Rec to continue with current fluid restriction as ordered.      Pt's height: 74" (3/15)      IBW: 190#+/-10%      Pt's weight: 135.2 kg (3/16)   Pertinent Medications: Lovenox, Vit C, Multivitamin, Vit D3, Zofran (PRN),   Pertinent Labs: (3/21) Na 130 L, Cl 89 L, phosphorus 4.7 H, Glu 114 H  Skin: per flow sheet -> pressure ulcer to L buttock - stage III; +wounds to L Calf, R & L heel     Estimated Needs: [x] Recalculated:  Estimated energy needs: ~1727 - 2159 Kcal/Day (@ 20 - 25 Kcal/Kg IBW - 86.36 kg)   Estimated Protein needs: ~95 - 112 gm Protein/day (@ 1.1-1.3 gm Protein/Kg IBW - 86.36 kg)    Previous Nutrition Diagnosis: No active nutrition diagnosis   New Nutrition Diagnosis: [x] Increased Nutrient Needs   Related to: for wound healing   As evidenced by: pressure ulcer to L buttock - stage III    Nutrition Interventions/Recommendations:   1. Increase No Carb Prosource (1 pkg = 15 gms Protein) Qty per day: 2;  2. Encourage & assist Pt with meals; Monitor PO diet tolerance;   3. Honor food preferences;  4. Replace/Replete e-lytes per MD discretion;   5. Monitor labs, weekly weights, hydration status;

## 2023-03-21 NOTE — BH CONSULTATION LIAISON PROGRESS NOTE - NSBHFUPINTERVALCCFT_PSY_A_CORE
code chapito yesterday-- patient wanting to leave [facility]  Transferred to [address] [place] [address].   Hand off given to [address] [place] [address] team

## 2023-03-21 NOTE — CHART NOTE - NSCHARTNOTEFT_GEN_A_CORE
I personally saw and examined the patient. Skin biopsy for H&E and tissue culture taken.    Performed skin biopsy x 2 for H&E and tissue culture as below.    Dermatology Punch Biopsy Procedure Note  -After risks and benefits of procedure including bleeding, infection and scar were reviewed (consents including photo consent reviewed, signed and in chart), allergies were reviewed and time out performed.    -Area cleaned with rubbing alcohol and anesthetized with lidocaine and epinephrine.  A 4mm punch biopsy x 2 was performed to the left lower leg, hemostasis achieved with 4-0 Chromic gut sutures. Dressing with Vaseline. Wound care reviewed with patient and team.  -Sutures are dissolvable, no need for removal. Please leave dressing on for 24-48 hours and after that please apply vaseline on the biopsy site 2-3 times daily to keep area moist and promote healing. I personally saw and examined the patient. Skin biopsy for H&E and tissue culture taken.    Performed skin biopsy x 2 for H&E and tissue culture as below.    Dermatology Punch Biopsy Procedure Note  -After risks and benefits of procedure including bleeding, infection and scar were reviewed (consents including photo consent reviewed, signed and in chart), allergies were reviewed and time out performed.    -Area cleaned with rubbing alcohol and anesthetized with lidocaine and epinephrine.  A 4mm punch biopsy x 2 was performed to the left lower leg, hemostasis achieved with 4-0 Chromic gut sutures. Dressing with Vaseline. Wound care reviewed with patient and team.  -Surgifoam placed. Please leave dressing on for 24-48 hours and after that please apply vaseline on the biopsy site 2-3 times daily to keep area moist and promote healing.

## 2023-03-21 NOTE — PROGRESS NOTE ADULT - NSPROGADDITIONALINFOA_GEN_ALL_CORE
d/w pt and GERMAN Aguirre.  on observation due to elope risk  ID, surgery and wound care follow up appreciated.     Kemal Tyson will be covering for the pt starting 3/22/23. He can be reached at  if needed.     - [facility] JA[facility]ales ([facility]  - [phone]

## 2023-03-21 NOTE — BH CONSULTATION LIAISON PROGRESS NOTE - NSBHFUPINTERVALHXFT_PSY_A_CORE
Met with the patient. He states- ' I want to go home. I like my apartment'. He asks for 'soda' and was reminded about his fluid restrictions. He can be child like in his mannerisms. He denies any mood symptoms, denies any si or hi, and denies any ah or vh or delusions when asked.   States that he has been in this apartment for 3 years, and is consistent in his f/u's. Has insight that he needs 'more help'.

## 2023-03-21 NOTE — PROGRESS NOTE ADULT - SUBJECTIVE AND OBJECTIVE BOX
[facility] DIVISION OF KIDNEY DISEASES AND HYPERTENSION   FOLLOW UP NOTE  --------------------------------------------------------------------------------  Chief Complaint: Hyponatremia    24 hour events/subjective: Pt. was seen and examined today. Doing well, no overt complaints.     PAST HISTORY  --------------------------------------------------------------------------------  No significant changes to PMH, PSH, FHx, SHx, unless otherwise noted    ALLERGIES & MEDICATIONS  --------------------------------------------------------------------------------  Allergies  amoxicillin (Fever)  penicillin (Rash)    Standing Inpatient Medications  albuterol    90 MICROgram(s) HFA Inhaler 2 Puff(s) Inhalation every 6 hours  ascorbic acid 500 milliGRAM(s) Oral daily  cefepime   IVPB 2000 milliGRAM(s) IV Intermittent every 12 hours  cholecalciferol 2000 Unit(s) Oral daily  clobetasol 0.05% Ointment 1 Application(s) Topical two times a day  Dakins Solution - 1/4 Strength 1 Application(s) Topical two times a day  diltiazem    milliGRAM(s) Oral daily  enoxaparin Injectable 40 milliGRAM(s) SubCutaneous every 24 hours  losartan 100 milliGRAM(s) Oral daily  metroNIDAZOLE    Tablet 500 milliGRAM(s) Oral two times a day  multivitamin 1 Tablet(s) Oral daily  nicotine - 21 mG/24Hr(s) Patch 1 Patch Transdermal daily  tiotropium 2.5 MICROgram(s) Inhaler 2 Puff(s) Inhalation daily  vancomycin  IVPB 1500 milliGRAM(s) IV Intermittent every 8 hours    PRN Inpatient Medications  acetaminophen     Tablet .. 650 milliGRAM(s) Oral every 6 hours PRN  albuterol/ipratropium for Nebulization 3 milliLiter(s) Nebulizer every 6 hours PRN  aluminum hydroxide/magnesium hydroxide/simethicone Suspension 30 milliLiter(s) Oral every 4 hours PRN  hydrOXYzine hydrochloride 50 milliGRAM(s) Oral at bedtime PRN  melatonin 3 milliGRAM(s) Oral at bedtime PRN  nicotine  Polacrilex Lozenge 4 milliGRAM(s) Oral every 2 hours PRN  ondansetron Injectable 4 milliGRAM(s) IV Push every 8 hours PRN    REVIEW OF SYSTEMS  --------------------------------------------------------------------------------  Gen: No fevers/chills  Head/Eyes/Ears: No HA   Respiratory: No dyspnea, cough  CV: No chest pain  GI: No abdominal pain, diarrhea  : No dysuria, hematuria  MSK: No edema  Skin: No rash  Heme: No easy bruising or bleeding    All other systems were reviewed and are negative, except as noted.    VITALS/PHYSICAL EXAM  --------------------------------------------------------------------------------  T(C): 36.7 (03-21-23 @ 04:45), Max: 36.8 (03-20-23 @ 20:27)  HR: 79 (03-21-23 @ 04:45) (79 - 91)  BP: 164/86 (03-21-23 @ 04:45) (141/85 - 164/97)  RR: 17 (03-21-23 @ 04:45) (17 - 18)  SpO2: 94% (03-21-23 @ 04:45) (94% - 96%)  Wt(kg): --    03-20-23 @ 07:01  -  03-21-23 @ 07:00  --------------------------------------------------------  IN: 600 mL / OUT: 0 mL / NET: 600 mL    Physical Exam:  	Gen: NAD  	HEENT: Anicteric  	Pulm: CTA B/L  	CV: S1S2+  	Abd: Soft, +BS   	Ext: No LE edema B/L  	Neuro: Awake  	Skin: Warm and dry    LABS/STUDIES  --------------------------------------------------------------------------------              14.2   8.13  >-----------<  212      [03-21-23 @ 11:10]              44.8     130  |  89  |  12  ----------------------------<  114      [03-21-23 @ 11:10]  4.7   |  33  |  0.66        Ca     9.6     [03-21-23 @ 11:10]      Mg     1.80     [03-21-23 @ 11:10]      Phos  4.7     [03-21-23 @ 11:10]    TPro  6.0  /  Alb  3.1  /  TBili  <0.2  /  DBili  x   /  AST  36  /  ALT  58  /  AlkPhos  121  [03-20-23 @ 12:40]    CK 84      [03-20-23 @ 12:40]    Creatinine Trend:  SCr 0.66 [03-21 @ 11:10]  SCr 0.63 [03-20 @ 12:40]  SCr 0.63 [03-18 @ 05:35]  SCr 0.62 [03-17 @ 06:30]  SCr 0.62 [03-16 @ 23:27]

## 2023-03-22 LAB
ALBUMIN SERPL ELPH-MCNC: 3.7 G/DL — SIGNIFICANT CHANGE UP (ref 3.3–5)
ALP SERPL-CCNC: 115 U/L — SIGNIFICANT CHANGE UP (ref 40–120)
ALT FLD-CCNC: 42 U/L — HIGH (ref 4–41)
ANION GAP SERPL CALC-SCNC: 11 MMOL/L — SIGNIFICANT CHANGE UP (ref 7–14)
ANION GAP SERPL CALC-SCNC: 7 MMOL/L — SIGNIFICANT CHANGE UP (ref 7–14)
AST SERPL-CCNC: 24 U/L — SIGNIFICANT CHANGE UP (ref 4–40)
BILIRUB DIRECT SERPL-MCNC: <0.2 MG/DL — SIGNIFICANT CHANGE UP (ref 0–0.3)
BILIRUB INDIRECT FLD-MCNC: SIGNIFICANT CHANGE UP MG/DL (ref 0–1)
BILIRUB SERPL-MCNC: <0.2 MG/DL — SIGNIFICANT CHANGE UP (ref 0.2–1.2)
BUN SERPL-MCNC: 13 MG/DL — SIGNIFICANT CHANGE UP (ref 7–23)
BUN SERPL-MCNC: <2 MG/DL — LOW (ref 7–23)
CALCIUM SERPL-MCNC: 9.2 MG/DL — SIGNIFICANT CHANGE UP (ref 8.4–10.5)
CALCIUM SERPL-MCNC: 9.6 MG/DL — SIGNIFICANT CHANGE UP (ref 8.4–10.5)
CHLORIDE SERPL-SCNC: 87 MMOL/L — LOW (ref 98–107)
CHLORIDE SERPL-SCNC: 90 MMOL/L — LOW (ref 98–107)
CK SERPL-CCNC: 59 U/L — SIGNIFICANT CHANGE UP (ref 30–200)
CO2 SERPL-SCNC: 27 MMOL/L — SIGNIFICANT CHANGE UP (ref 22–31)
CO2 SERPL-SCNC: 31 MMOL/L — SIGNIFICANT CHANGE UP (ref 22–31)
CREAT SERPL-MCNC: 0.57 MG/DL — SIGNIFICANT CHANGE UP (ref 0.5–1.3)
CREAT SERPL-MCNC: 0.62 MG/DL — SIGNIFICANT CHANGE UP (ref 0.5–1.3)
EGFR: 113 ML/MIN/1.73M2 — SIGNIFICANT CHANGE UP
EGFR: 116 ML/MIN/1.73M2 — SIGNIFICANT CHANGE UP
GLUCOSE SERPL-MCNC: 100 MG/DL — HIGH (ref 70–99)
GLUCOSE SERPL-MCNC: 120 MG/DL — HIGH (ref 70–99)
GRAM STN FLD: SIGNIFICANT CHANGE UP
HCT VFR BLD CALC: 43.4 % — SIGNIFICANT CHANGE UP (ref 39–50)
HGB BLD-MCNC: 14 G/DL — SIGNIFICANT CHANGE UP (ref 13–17)
IGG SERPL-MCNC: 671 MG/DL — SIGNIFICANT CHANGE UP (ref 603–1613)
IGG1 SER-MCNC: 348 MG/DL — SIGNIFICANT CHANGE UP (ref 248–810)
IGG2 SER-MCNC: 247 MG/DL — SIGNIFICANT CHANGE UP (ref 130–555)
IGG3 SER-MCNC: 19 MG/DL — SIGNIFICANT CHANGE UP (ref 15–102)
IGG4 SER-MCNC: 3 MG/DL — SIGNIFICANT CHANGE UP (ref 2–96)
MAGNESIUM SERPL-MCNC: 1.7 MG/DL — SIGNIFICANT CHANGE UP (ref 1.6–2.6)
MAGNESIUM SERPL-MCNC: 1.9 MG/DL — SIGNIFICANT CHANGE UP (ref 1.6–2.6)
MCHC RBC-ENTMCNC: 25.7 PG — LOW (ref 27–34)
MCHC RBC-ENTMCNC: 32.3 GM/DL — SIGNIFICANT CHANGE UP (ref 32–36)
MCV RBC AUTO: 79.6 FL — LOW (ref 80–100)
NRBC # BLD: 0 /100 WBCS — SIGNIFICANT CHANGE UP (ref 0–0)
NRBC # FLD: 0 K/UL — SIGNIFICANT CHANGE UP (ref 0–0)
PHOSPHATE SERPL-MCNC: 3.7 MG/DL — SIGNIFICANT CHANGE UP (ref 2.5–4.5)
PHOSPHATE SERPL-MCNC: 4.4 MG/DL — SIGNIFICANT CHANGE UP (ref 2.5–4.5)
PLATELET # BLD AUTO: 214 K/UL — SIGNIFICANT CHANGE UP (ref 150–400)
POTASSIUM SERPL-MCNC: 4.6 MMOL/L — SIGNIFICANT CHANGE UP (ref 3.5–5.3)
POTASSIUM SERPL-MCNC: 4.7 MMOL/L — SIGNIFICANT CHANGE UP (ref 3.5–5.3)
POTASSIUM SERPL-SCNC: 4.6 MMOL/L — SIGNIFICANT CHANGE UP (ref 3.5–5.3)
POTASSIUM SERPL-SCNC: 4.7 MMOL/L — SIGNIFICANT CHANGE UP (ref 3.5–5.3)
PROT SERPL-MCNC: 6.1 G/DL — SIGNIFICANT CHANGE UP (ref 6–8.3)
RBC # BLD: 5.45 M/UL — SIGNIFICANT CHANGE UP (ref 4.2–5.8)
RBC # FLD: 15.8 % — HIGH (ref 10.3–14.5)
SODIUM SERPL-SCNC: 125 MMOL/L — LOW (ref 135–145)
SODIUM SERPL-SCNC: 128 MMOL/L — LOW (ref 135–145)
[facility] SOURCE: SIGNIFICANT CHANGE UP
VANCOMYCIN TROUGH SERPL-MCNC: 12.7 UG/ML — SIGNIFICANT CHANGE UP (ref 10–20)
WBC # BLD: 7.35 K/UL — SIGNIFICANT CHANGE UP (ref 3.8–10.5)
WBC # FLD AUTO: 7.35 K/UL — SIGNIFICANT CHANGE UP (ref 3.8–10.5)

## 2023-03-22 PROCEDURE — 99223 1ST HOSP IP/OBS HIGH 75: CPT

## 2023-03-22 PROCEDURE — 99232 SBSQ HOSP IP/OBS MODERATE 35: CPT

## 2023-03-22 RX ADMIN — Medication 300 MILLIGRAM(S): at 22:34

## 2023-03-22 RX ADMIN — ALBUTEROL 2 PUFF(S): 90 AEROSOL, METERED ORAL at 21:47

## 2023-03-22 RX ADMIN — ENOXAPARIN SODIUM 40 MILLIGRAM(S): 100 INJECTION SUBCUTANEOUS at 17:19

## 2023-03-22 RX ADMIN — Medication 1 PATCH: at 16:03

## 2023-03-22 RX ADMIN — Medication 2000 UNIT(S): at 12:31

## 2023-03-22 RX ADMIN — Medication 500 MILLIGRAM(S): at 06:12

## 2023-03-22 RX ADMIN — Medication 500 MILLIGRAM(S): at 12:30

## 2023-03-22 RX ADMIN — Medication 4 MILLIGRAM(S): at 17:19

## 2023-03-22 RX ADMIN — Medication 1 PATCH: at 17:56

## 2023-03-22 RX ADMIN — Medication 1 PATCH: at 12:28

## 2023-03-22 RX ADMIN — ALBUTEROL 2 PUFF(S): 90 AEROSOL, METERED ORAL at 17:17

## 2023-03-22 RX ADMIN — CEFEPIME 100 MILLIGRAM(S): 1 INJECTION, POWDER, FOR SOLUTION INTRAMUSCULAR; INTRAVENOUS at 03:13

## 2023-03-22 RX ADMIN — ALBUTEROL 2 PUFF(S): 90 AEROSOL, METERED ORAL at 06:00

## 2023-03-22 RX ADMIN — Medication 300 MILLIGRAM(S): at 06:14

## 2023-03-22 RX ADMIN — Medication 500 MILLIGRAM(S): at 17:18

## 2023-03-22 RX ADMIN — CEFEPIME 100 MILLIGRAM(S): 1 INJECTION, POWDER, FOR SOLUTION INTRAMUSCULAR; INTRAVENOUS at 14:19

## 2023-03-22 RX ADMIN — LOSARTAN POTASSIUM 100 MILLIGRAM(S): 100 TABLET, FILM COATED ORAL at 06:00

## 2023-03-22 RX ADMIN — Medication 1 PATCH: at 07:56

## 2023-03-22 RX ADMIN — ALBUTEROL 2 PUFF(S): 90 AEROSOL, METERED ORAL at 12:29

## 2023-03-22 RX ADMIN — Medication 1 APPLICATION(S): at 17:18

## 2023-03-22 RX ADMIN — Medication 1 APPLICATION(S): at 17:17

## 2023-03-22 RX ADMIN — Medication 300 MILLIGRAM(S): at 13:03

## 2023-03-22 RX ADMIN — Medication 1 APPLICATION(S): at 06:13

## 2023-03-22 RX ADMIN — Medication 4 MILLIGRAM(S): at 12:30

## 2023-03-22 RX ADMIN — Medication 1 TABLET(S): at 12:30

## 2023-03-22 RX ADMIN — TIOTROPIUM BROMIDE 2 PUFF(S): 18 CAPSULE ORAL; RESPIRATORY (INHALATION) at 12:28

## 2023-03-22 RX ADMIN — Medication 300 MILLIGRAM(S): at 06:01

## 2023-03-22 NOTE — PROGRESS NOTE ADULT - SUBJECTIVE AND OBJECTIVE BOX
CC: F/U for Bacteremia    Saw/spoke to patient. No fevers, no chills. No new complaints.    Allergies  amoxicillin (Fever)  penicillin (Rash)    ANTIMICROBIALS:  cefepime   IVPB 2000 every 12 hours  metroNIDAZOLE    Tablet 500 two times a day  vancomycin  IVPB 1500 every 8 hours    PE:    Vital Signs Last 24 Hrs  T(C): 37 (22 Mar 2023 05:02), Max: 37 (22 Mar 2023 05:02)  T(F): 98.6 (22 Mar 2023 05:02), Max: 98.6 (22 Mar 2023 05:02)  HR: 86 (22 Mar 2023 05:02) (74 - 86)  BP: 164/82 (22 Mar 2023 05:02) (161/82 - 164/82)  RR: 18 (22 Mar 2023 05:02) (18 - 18)  SpO2: 95% (22 Mar 2023 05:02) (95% - 97%)    Gen: AOx3, NAD, non-toxic  CV: Nontachycardic  Resp: Breathing comfortably, RA  Abd: Soft, nontender  IV/Skin: No thrombophlebitis    LABS:                        14.0   7.35  )-----------( 214      ( 22 Mar 2023 05:58 )             43.4     03-22    128<L>  |  90<L>  |  13  ----------------------------<  100<H>  4.7   |  31  |  0.57    Ca    9.6      22 Mar 2023 05:58  Phos  4.4     03-22  Mg     1.90     03-22    TPro  6.1  /  Alb  3.7  /  TBili  <0.2  /  DBili  <0.2  /  AST  24  /  ALT  42<H>  /  AlkPhos  115  03-22    MICROBIOLOGY:    .Tissue LEFT LOWER LEG PUNCH BIOPSY  03-21-23 --  --    No polymorphonuclear leukocytes seen per low power field  No organisms seen per oil power field    Clean Catch Clean Catch (Midstream)  03-15-23   No growth  --  --    .Blood Blood-Peripheral  03-15-23   No Growth Final  --  --    .Blood Blood-Peripheral  03-15-23   No Growth Final  --  --    (otherwise reviewed)    RADIOLOGY:    3/15 CT:    IMPRESSION:  Left buttock cellulitis without evidence of abscess. Consider MRI to   evaluate for perianal fistula.

## 2023-03-22 NOTE — PROGRESS NOTE ADULT - SUBJECTIVE AND OBJECTIVE BOX
He is s/p punch biopsy LLE yesterday    Vital Signs Last 24 Hrs  T(C): 37 (22 Mar 2023 05:02), Max: 37 (22 Mar 2023 05:02)  T(F): 98.6 (22 Mar 2023 05:02), Max: 98.6 (22 Mar 2023 05:02)  HR: 86 (22 Mar 2023 05:02) (74 - 86)  BP: 164/82 (22 Mar 2023 05:02) (155/78 - 164/82)  BP(mean): --  RR: 18 (22 Mar 2023 05:02) (17 - 18)  SpO2: 95% (22 Mar 2023 05:02) (95% - 97%)    I&O's Summary    03-21-23 @ 07:01  -  03-22-23 @ 07:00  --------------------------------------------------------  IN: 600 mL / OUT: 0 mL / NET: 600 mL    03-22-23 @ 07:01  -  03-22-23 @ 09:12  --------------------------------------------------------  IN: 300 mL / OUT: 0 mL / NET: 300 mL        PHYSICAL EXAM:  GENERAL: NAD, well-developed, comfortable  HEAD:  Atraumatic, Normocephalic  EYES: EOMI, PERRLA, conjunctiva and sclera clear  NECK: Supple, No JVD  CHEST/LUNG: mild decrease breath sounds bilaterally; No wheeze   HEART: Regular rate and rhythm; No murmurs, rubs, or gallops  ABDOMEN: Soft, Nontender, Nondistended; Bowel sounds present  NEURO: AAOx3, understands why he is admitted, able to move all four extremities spontaneously  Skin: left buttocks: erythematous, indurated, with shallow ulceration (non bleeding/draining) about 4-5 cm, indurated, no fluctuance noted. LLE calf: non healing bleeding wound about 2cm diameter, no superimposed infection    LABS:                        14.0   7.35  )-----------( 214      ( 22 Mar 2023 05:58 )             43.4     03-22    128<L>  |  90<L>  |  13  ----------------------------<  100<H>  4.7   |  31  |  0.57    Ca    9.6      22 Mar 2023 05:58  Phos  4.4     03-22  Mg     1.90     03-22    TPro  6.1  /  Alb  3.7  /  TBili  <0.2  /  DBili  <0.2  /  AST  24  /  ALT  42<H>  /  AlkPhos  115  03-22      CAPILLARY BLOOD GLUCOSE      POCT Blood Glucose.: 127 mg/dL (21 Mar 2023 20:41)    CARDIAC MARKERS ( 22 Mar 2023 05:58 )  x     / x     / 59 U/L / x     / x      CARDIAC MARKERS ( 20 Mar 2023 12:40 )  x     / x     / 84 U/L / x     / x              RADIOLOGY & ADDITIONAL TESTS:    Imaging Personally Reviewed:  [x] YES  [ ] NO    Case discussed with NPP:  [X] YES  [ ] NO

## 2023-03-22 NOTE — BH CONSULTATION LIAISON PROGRESS NOTE - NSBHFUPINTERVALHXFT_PSY_A_CORE
Chart reviewed. Discussed in IDRs. Stable behavior, on IV ABX. No PRNs overnight. No standing psychiatric meds. On exam, he is AA ox 3, simple/concrete but cooperative/pleasant. Says he wants to go home when able. Lives alone, wants to return to his [facility] outpatient psychiatrist. Denies any current depression, anxiety/panic, SI, HI, AVH, paranoia, or safety concerns. No focal neuro deficits.

## 2023-03-22 NOTE — CONSULT NOTE ADULT - ATTENDING COMMENTS
Lesion may be partially factitial (with underlying lichen simples or psoriasis). Biopsy to rule out a non-melanoma skin cancer which can arise at sites of chronic trauma.

## 2023-03-22 NOTE — BH CONSULTATION LIAISON PROGRESS NOTE - NSBHASSESSMENTFT_PSY_ALL_CORE
56yoM domiciled in supportive housing TSI, SSD, single, PMH HTN, DM, hypogammaglobulinemia, PPHx schizoaffective d/o, bipolar d/o, hx of multiple psych hospitalizations (last known in [id number] [facility]), denies hx SA/NSSIB, denies drug or alcohol use, denies legal hx, who BIB sister for bizarre behavior, admitted to medicine for significant cellulitis of the left buttocks with ulceration. Psychiatry consulted for psychosis/schizophrenia.    On assessment, patient appears to be calm and cooperative. He denies any mood, psychotic and manic symptoms. He was alert and oriented x3. Patient appears to have some decline in ability to care for self but does not seem to be stemming from depressive symptoms. Patient could be exhibiting some negative psychotic symptoms however pt with multiple medical factors that could be contributing to poor hygiene (not showering, chaotic apartment). Per sister, patient has been less hygienic and unable to make it to the bathroom. Differential include AMS from hypoxia vs infectious vs electrolyte derangement vs negative symptoms from schizophrenia vs physical restrictions impairing patient ability to be independent. Will gather collateral from psychiatrist, Dr. Cook for further information. Per Avatar, patient did received Haldol dec 150 mg on 2/15 at [facility] clinic.    3/17: Patient is well, no note able signs of psychosis and denying all mood, psychotic or manic symptoms. Patient appears to have some concrete thought process but otherwise linear and rational. No concern for his safety. Recommend ordering Haldol dec 150 mg IM giving pt has qtc <500 and is due for this month. Discussed with team and concern for Paxil contributing to hyponatremia given past hospitalization with evidence for SIADH. Patient informed about Paxil being discontinued and he is agreeable.    3/21--- childish, can be impulsive at times- demanding to leave ama. Struggles to understand reasons to stay-- has LE cellulitis, is on IV antibiotics. No mason psychosis noted.     3/22-- concrete/simple, but cooperating with care. No further demands to leave AMA. No mason psychosis again. No PRNs.     Plan:  - Routine Obs, no SI  - No standing psych meds at this time  - Received Haldol dec 150 mg IM on 3/17/2023.   - Cannot leave AMA, lacks capacity  - Dispo: please coordinate a safe d/c plan with sister Brittany; does NOT require inpatient psych care, can follow with existing providers. Next Haldol Dec is not due until 4/16/2023.   - [facility] Psych will sign off, but please call us as needed. No psych contraindication to discharge when medically cleared.

## 2023-03-22 NOTE — CONSULT NOTE ADULT - ASSESSMENT
==========INCOMPLETE==========  note/recommendations are not complete until attending signature/attestation    #Lesion on the left lower extremity  DDx includes a chronic wound 2/2 manipulation v pyoderma gangrenosum – do not favor malignancy on clinical examination  - F/u H&E and tissue culture  - *****    #Cellulitis of the left buttocks  Cellulitis is a common bacterial infection of the deep dermis and subcutaneous tissue characterized by erythema, pain, warmth, and swelling. Pathogens of cellulitis are strongly correlated with age and immune status.  - Agree with abx regimen as guided by ID    The patient's chart was reviewed in addition to being seen and examined at bedside with the dermatology attending.  Recommendations were communicated with the primary team.  Please page [phone] with a 10-digit call-back number for further related questions.    Gucci Garland MD  Resident Physician, PGY-3  [facility] Dermatology  Pager: [phone]  Office: [phone]   #Lesion on the left lower extremity  DDx includes a chronic wound 2/2 manipulation v NMSC (SCC or BCC), or pyoderma gangrenosum (low suspicion  - F/u H&E and tissue culture  - C/w wound care    #Cellulitis of the left buttocks  Cellulitis is a common bacterial infection of the deep dermis and subcutaneous tissue characterized by erythema, pain, warmth, and swelling. Pathogens of cellulitis are strongly correlated with age and immune status.  - S/p ID by surgery with packing  - Agree with antibiotic regimen as guided by ID    The patient's chart was reviewed in addition to being seen and examined at bedside with the dermatology attending.  Recommendations were communicated with the primary team.  Please page [phone] with a 10-digit call-back number for further related questions.    Gucci Garland MD  Resident Physician, PGY-3  [facility] Dermatology  Pager: [phone]  Office: [phone]   #Lesion on the left lower extremity  DDx includes a chronic wound 2/2 manipulation v NMSC (SCC or BCC), or pyoderma gangrenosum (low suspicion  - F/u H&E and tissue culture  - C/w wound care    #Cellulitis of the left buttocks  Cellulitis is a common bacterial infection of the deep dermis and subcutaneous tissue characterized by erythema, pain, warmth, and swelling. Pathogens of cellulitis are strongly correlated with age and immune status.  - S/p ID by surgery with packing  - Agree with antibiotic regimen as guided by ID    The patient's chart was reviewed in addition to being seen and examined at bedside with the dermatology attending.  Recommendations were communicated with the primary team.  Please page [phone] with a 10-digit call-back number for further related questions.     Gucci Garland MD  Resident Physician, PGY-3  [facility] Dermatology  Pager: [phone]  Office: [phone]

## 2023-03-22 NOTE — CONSULT NOTE ADULT - CONSULT REASON
Buttock wounds
Gluteal wound
LE wound and left buttock wound
Hyponatremia
?perianal fistula
LLE wound

## 2023-03-22 NOTE — CONSULT NOTE ADULT - CONSULT REQUESTED DATE/TIME
17-Mar-2023 15:37
16-Mar-2023 19:14
22-Mar-2023 12:23
16-Mar-2023 21:03
20-Mar-2023 12:15
16-Mar-2023 14:46
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
## 2023-03-22 NOTE — CONSULT NOTE ADULT - REASON FOR ADMISSION
cellulitis, delusions

## 2023-03-22 NOTE — PROGRESS NOTE ADULT - ASSESSMENT
57 year old with schizophrenia, hypogammaglobulinemia on IVIG, presents with a change in behavior  Noted to have a left gluteal wound  Advised that at [facility] (OSH) patient was found to have MRSA Bacteremia 3/12  3/15 BCXs NGTD  Source MRSA bacteremia--buttock abscess/wound?    1) Left Buttock wound  Imaging and history raise concern for an underlying fistula  Appreciate surgery input and I+D procedure; F/U surgery  Would obtain wound culture from site if any purulent drainage  Vanco as below  Continue cefepime/ flagyl 500 mg po q 12 through today's doses then discontinue  Wound care eval    2) MRSA Bacteremia  - OSH positive BCX as above  - Vanco 1500mg q 8, monitor levels--through 4/11/23  - TTE generally reassuring, hold on LINDA for now  - F/U pending bCXs  - May be challenging disposition given underlying psych concerns/reliability; anticipate would need facility placement to complete regimen, likely needs PICC line for prolonged course vanco (but doubt patient would be able to manage PICC line/antibiotics at home by himself)    3) Transaminitis  Check acute hepatitis panel  Trend    4) CVID  Check immuoglobulins  On imunoglobulin treatment monthly    Josr Downs MD  Contact on TEAMS messaging from 9am - 5pm  From 5pm-9am, on weekends, or if no response call [phone]   57 year old with schizophrenia, hypogammaglobulinemia on IVIG, presents with a change in behavior  Noted to have a left gluteal wound  Advised that at [facility] (OSH) patient was found to have MRSA Bacteremia 3/12  3/15 BCXs NGTD  Source MRSA bacteremia--buttock abscess/wound?    1) Left Buttock wound  Imaging and history raise concern for an underlying fistula  Appreciate surgery input and I+D procedure; F/U surgery  Would obtain wound culture from site if any purulent drainage  Vanco as below  Continue cefepime/ flagyl 500 mg po q 12 through today's doses then discontinue  Wound care eval    2) MRSA Bacteremia  - OSH positive BCX as above  - Vanco 1500mg q 8, monitor levels--through 4/11/23  - TTE generally reassuring, hold on LINDA for now  - F/U pending bCXs  - May be challenging disposition given underlying psych concerns/reliability; anticipate would need facility placement to complete regimen, likely needs PICC line for prolonged course vanco (but doubt patient would be able to manage PICC line/antibiotics at home by himself)    3) Transaminitis  Check acute hepatitis panel  Trend    4) CVID  Check immuoglobulins  On imunoglobulin treatment monthly    My colleagues will be covering this patient starting on 3/23/23, I will return 3/24/23. Please call [phone] or on call fellow with any questions or change in status.     Josr Downs MD  Contact on TEAMS messaging from 9am - 5pm  From 5pm-9am, on weekends, or if no response call [phone]

## 2023-03-23 LAB
-  AMPICILLIN/SULBACTAM: SIGNIFICANT CHANGE UP
-  CEFAZOLIN: SIGNIFICANT CHANGE UP
-  CLINDAMYCIN: SIGNIFICANT CHANGE UP
-  DAPTOMYCIN: SIGNIFICANT CHANGE UP
-  ERYTHROMYCIN: SIGNIFICANT CHANGE UP
-  GENTAMICIN: SIGNIFICANT CHANGE UP
-  LINEZOLID: SIGNIFICANT CHANGE UP
-  OXACILLIN: SIGNIFICANT CHANGE UP
-  PENICILLIN: SIGNIFICANT CHANGE UP
-  RIFAMPIN: SIGNIFICANT CHANGE UP
-  TETRACYCLINE: SIGNIFICANT CHANGE UP
-  TRIMETHOPRIM/SULFAMETHOXAZOLE: SIGNIFICANT CHANGE UP
-  VANCOMYCIN: SIGNIFICANT CHANGE UP
ALBUMIN SERPL ELPH-MCNC: 3.4 G/DL — SIGNIFICANT CHANGE UP (ref 3.3–5)
ALP SERPL-CCNC: 109 U/L — SIGNIFICANT CHANGE UP (ref 40–120)
ALT FLD-CCNC: 44 U/L — HIGH (ref 4–41)
ANION GAP SERPL CALC-SCNC: 8 MMOL/L — SIGNIFICANT CHANGE UP (ref 7–14)
AST SERPL-CCNC: 23 U/L — SIGNIFICANT CHANGE UP (ref 4–40)
BILIRUB DIRECT SERPL-MCNC: <0.2 MG/DL — SIGNIFICANT CHANGE UP (ref 0–0.3)
BILIRUB INDIRECT FLD-MCNC: >0 MG/DL — SIGNIFICANT CHANGE UP (ref 0–1)
BILIRUB SERPL-MCNC: 0.2 MG/DL — SIGNIFICANT CHANGE UP (ref 0.2–1.2)
BUN SERPL-MCNC: 12 MG/DL — SIGNIFICANT CHANGE UP (ref 7–23)
CALCIUM SERPL-MCNC: 9.2 MG/DL — SIGNIFICANT CHANGE UP (ref 8.4–10.5)
CHLORIDE SERPL-SCNC: 91 MMOL/L — LOW (ref 98–107)
CK SERPL-CCNC: 47 U/L — SIGNIFICANT CHANGE UP (ref 30–200)
CO2 SERPL-SCNC: 30 MMOL/L — SIGNIFICANT CHANGE UP (ref 22–31)
CREAT SERPL-MCNC: 0.6 MG/DL — SIGNIFICANT CHANGE UP (ref 0.5–1.3)
EGFR: 114 ML/MIN/1.73M2 — SIGNIFICANT CHANGE UP
GLUCOSE SERPL-MCNC: 104 MG/DL — HIGH (ref 70–99)
HCT VFR BLD CALC: 44.8 % — SIGNIFICANT CHANGE UP (ref 39–50)
HGB BLD-MCNC: 14.2 G/DL — SIGNIFICANT CHANGE UP (ref 13–17)
MAGNESIUM SERPL-MCNC: 1.9 MG/DL — SIGNIFICANT CHANGE UP (ref 1.6–2.6)
MCHC RBC-ENTMCNC: 25.4 PG — LOW (ref 27–34)
MCHC RBC-ENTMCNC: 31.7 GM/DL — LOW (ref 32–36)
MCV RBC AUTO: 80.3 FL — SIGNIFICANT CHANGE UP (ref 80–100)
METHOD TYPE: SIGNIFICANT CHANGE UP
NRBC # BLD: 0 /100 WBCS — SIGNIFICANT CHANGE UP (ref 0–0)
NRBC # FLD: 0 K/UL — SIGNIFICANT CHANGE UP (ref 0–0)
PHOSPHATE SERPL-MCNC: 4.3 MG/DL — SIGNIFICANT CHANGE UP (ref 2.5–4.5)
PLATELET # BLD AUTO: 208 K/UL — SIGNIFICANT CHANGE UP (ref 150–400)
POTASSIUM SERPL-MCNC: 4.6 MMOL/L — SIGNIFICANT CHANGE UP (ref 3.5–5.3)
POTASSIUM SERPL-SCNC: 4.6 MMOL/L — SIGNIFICANT CHANGE UP (ref 3.5–5.3)
PROT SERPL-MCNC: 6.1 G/DL — SIGNIFICANT CHANGE UP (ref 6–8.3)
RBC # BLD: 5.58 M/UL — SIGNIFICANT CHANGE UP (ref 4.2–5.8)
RBC # FLD: 15.9 % — HIGH (ref 10.3–14.5)
SARS-COV-2 RNA SPEC QL NAA+PROBE: SIGNIFICANT CHANGE UP
SODIUM SERPL-SCNC: 129 MMOL/L — LOW (ref 135–145)
WBC # BLD: 6.54 K/UL — SIGNIFICANT CHANGE UP (ref 3.8–10.5)
WBC # FLD AUTO: 6.54 K/UL — SIGNIFICANT CHANGE UP (ref 3.8–10.5)

## 2023-03-23 RX ADMIN — ENOXAPARIN SODIUM 40 MILLIGRAM(S): 100 INJECTION SUBCUTANEOUS at 18:21

## 2023-03-23 RX ADMIN — Medication 1 APPLICATION(S): at 18:18

## 2023-03-23 RX ADMIN — Medication 300 MILLIGRAM(S): at 05:08

## 2023-03-23 RX ADMIN — Medication 1 PATCH: at 11:46

## 2023-03-23 RX ADMIN — LOSARTAN POTASSIUM 100 MILLIGRAM(S): 100 TABLET, FILM COATED ORAL at 05:10

## 2023-03-23 RX ADMIN — Medication 300 MILLIGRAM(S): at 14:34

## 2023-03-23 RX ADMIN — Medication 1 PATCH: at 12:49

## 2023-03-23 RX ADMIN — Medication 1 PATCH: at 18:18

## 2023-03-23 RX ADMIN — Medication 1 APPLICATION(S): at 05:09

## 2023-03-23 RX ADMIN — ALBUTEROL 2 PUFF(S): 90 AEROSOL, METERED ORAL at 11:48

## 2023-03-23 RX ADMIN — Medication 1 TABLET(S): at 11:47

## 2023-03-23 RX ADMIN — ALBUTEROL 2 PUFF(S): 90 AEROSOL, METERED ORAL at 05:07

## 2023-03-23 RX ADMIN — Medication 300 MILLIGRAM(S): at 05:10

## 2023-03-23 RX ADMIN — Medication 2000 UNIT(S): at 11:47

## 2023-03-23 RX ADMIN — Medication 500 MILLIGRAM(S): at 11:47

## 2023-03-23 RX ADMIN — TIOTROPIUM BROMIDE 2 PUFF(S): 18 CAPSULE ORAL; RESPIRATORY (INHALATION) at 11:48

## 2023-03-23 RX ADMIN — Medication 1 PATCH: at 06:12

## 2023-03-23 RX ADMIN — ALBUTEROL 2 PUFF(S): 90 AEROSOL, METERED ORAL at 17:59

## 2023-03-23 NOTE — CHART NOTE - NSCHARTNOTEFT_GEN_A_CORE
PA requested by IR to cancel PICC line order due to duration of IV Vancomycin ending 4/11/23. IR states Vancomycin can be administered through a midline for this timeframe.    ACP team to follow up with IV nurse in AM for midline to help facilitate discharge.    Aron Mancera PA-C,   Internal Medicine ACP   In house pager #[phone]

## 2023-03-23 NOTE — PROGRESS NOTE ADULT - SUBJECTIVE AND OBJECTIVE BOX
No acute pain  No N/V/abd pain    Vital Signs Last 24 Hrs  T(C): 36.4 (23 Mar 2023 04:35), Max: 36.4 (22 Mar 2023 21:30)  T(F): 97.6 (23 Mar 2023 04:35), Max: 97.6 (23 Mar 2023 04:35)  HR: 69 (23 Mar 2023 04:35) (69 - 69)  BP: 121/72 (23 Mar 2023 04:35) (121/72 - 125/88)  BP(mean): 85 (23 Mar 2023 04:35) (85 - 85)  RR: 17 (23 Mar 2023 04:35) (16 - 17)  SpO2: 95% (23 Mar 2023 04:35) (95% - 99%)    I&O's Summary    03-22-23 @ 07:01  -  03-23-23 @ 07:00  --------------------------------------------------------  IN: 300 mL / OUT: 0 mL / NET: 300 mL        PHYSICAL EXAM:  GENERAL: NAD, well-developed, comfortable  HEAD:  Atraumatic, Normocephalic  EYES: EOMI, PERRLA, conjunctiva and sclera clear  NECK: Supple, No JVD  CHEST/LUNG: mild decrease breath sounds bilaterally; No wheeze   HEART: Regular rate and rhythm; No murmurs, rubs, or gallops  ABDOMEN: Soft, Nontender, Nondistended; Bowel sounds present  NEURO: AAOx3, understands why he is admitted, able to move all four extremities spontaneously  Skin: left buttocks: erythematous, indurated, with shallow ulceration (non bleeding/draining) about 4-5 cm, indurated, no fluctuance noted. LLE calf: non healing bleeding wound about 2cm diameter, no superimposed infection    LABS:                        14.2   6.54  )-----------( 208      ( 23 Mar 2023 04:58 )             44.8     03-23    129<L>  |  91<L>  |  12  ----------------------------<  104<H>  4.6   |  30  |  0.60    Ca    9.2      23 Mar 2023 04:58  Phos  4.3     03-23  Mg     1.90     03-23    TPro  6.1  /  Alb  3.4  /  TBili  0.2  /  DBili  <0.2  /  AST  23  /  ALT  44<H>  /  AlkPhos  109  03-23      CAPILLARY BLOOD GLUCOSE      POCT Blood Glucose.: 131 mg/dL (23 Mar 2023 07:49)  POCT Blood Glucose.: 163 mg/dL (22 Mar 2023 21:45)  POCT Blood Glucose.: 103 mg/dL (22 Mar 2023 16:36)    CARDIAC MARKERS ( 23 Mar 2023 04:58 )  x     / x     / 47 U/L / x     / x      CARDIAC MARKERS ( 22 Mar 2023 05:58 )  x     / x     / 59 U/L / x     / x              RADIOLOGY & ADDITIONAL TESTS:    Imaging Personally Reviewed:  [x] YES  [ ] NO    Case discussed with NPP:  [X] YES  [ ] NO

## 2023-03-23 NOTE — CHART NOTE - NSCHARTNOTEFT_GEN_A_CORE
IR PICC order placed as patient will require PICC line to complete IV Vancomycin through 4/11/23. Supervision approval obtained. ACP team to follow up IR note.     D/w Attending    Aron Mancera PA-C,   Internal Medicine ACP   In house pager #[phone]

## 2023-03-23 NOTE — PROGRESS NOTE ADULT - ASSESSMENT
58 y/o M with hx of multiple recent admissions of LLE cellulitis, hypogammaglobulinemia (on monthly IVIG), schizoaffective d/o (on haldol and Paxil) who was brought in by sister for bizarre behavior/delusional thinking, found to have significant cellulitis of the left buttocks with ulceration. no signs of nec fascitis at this time and no evidence of drainable abscess.

## 2023-03-24 PROCEDURE — 99233 SBSQ HOSP IP/OBS HIGH 50: CPT

## 2023-03-24 PROCEDURE — 99232 SBSQ HOSP IP/OBS MODERATE 35: CPT

## 2023-03-24 RX ADMIN — Medication 1 APPLICATION(S): at 18:22

## 2023-03-24 RX ADMIN — Medication 300 MILLIGRAM(S): at 15:26

## 2023-03-24 RX ADMIN — ALBUTEROL 2 PUFF(S): 90 AEROSOL, METERED ORAL at 18:25

## 2023-03-24 RX ADMIN — ENOXAPARIN SODIUM 40 MILLIGRAM(S): 100 INJECTION SUBCUTANEOUS at 18:25

## 2023-03-24 RX ADMIN — Medication 1 APPLICATION(S): at 06:07

## 2023-03-24 RX ADMIN — Medication 1 PATCH: at 11:14

## 2023-03-24 RX ADMIN — ALBUTEROL 2 PUFF(S): 90 AEROSOL, METERED ORAL at 21:56

## 2023-03-24 RX ADMIN — Medication 300 MILLIGRAM(S): at 01:07

## 2023-03-24 RX ADMIN — Medication 1 PATCH: at 11:06

## 2023-03-24 RX ADMIN — TIOTROPIUM BROMIDE 2 PUFF(S): 18 CAPSULE ORAL; RESPIRATORY (INHALATION) at 11:05

## 2023-03-24 RX ADMIN — Medication 1 TABLET(S): at 11:07

## 2023-03-24 RX ADMIN — Medication 300 MILLIGRAM(S): at 06:07

## 2023-03-24 RX ADMIN — Medication 1 PATCH: at 07:07

## 2023-03-24 RX ADMIN — Medication 1 PATCH: at 18:23

## 2023-03-24 RX ADMIN — Medication 300 MILLIGRAM(S): at 21:55

## 2023-03-24 RX ADMIN — Medication 500 MILLIGRAM(S): at 11:07

## 2023-03-24 RX ADMIN — Medication 2000 UNIT(S): at 11:07

## 2023-03-24 RX ADMIN — ALBUTEROL 2 PUFF(S): 90 AEROSOL, METERED ORAL at 11:06

## 2023-03-24 NOTE — PROGRESS NOTE ADULT - SUBJECTIVE AND OBJECTIVE BOX
CC: F/U for Bacteremia    Saw/spoke to patient. No fevers, no chills. No new complaints.    Allergies  amoxicillin (Fever)  penicillin (Rash)    ANTIMICROBIALS:  vancomycin  IVPB 1500 every 8 hours    PE:    Vital Signs Last 24 Hrs  T(C): 36.2 (24 Mar 2023 11:37), Max: 36.8 (24 Mar 2023 04:25)  T(F): 97.2 (24 Mar 2023 11:37), Max: 98.3 (24 Mar 2023 04:25)  HR: 71 (24 Mar 2023 11:37) (71 - 81)  BP: 159/85 (24 Mar 2023 11:37) (132/80 - 159/85)  RR: 17 (24 Mar 2023 04:25) (17 - 17)  SpO2: 100% (24 Mar 2023 11:37) (100% - 100%)    Gen: AOx3, NAD, non-toxic  CV: Nontachycardic  Resp: Breathing comfortably, RA  Abd: Soft, nontender  IV/Skin: No thrombophlebitis    LABS:                        14.2   6.54  )-----------( 208      ( 23 Mar 2023 04:58 )             44.8     03-23    129<L>  |  91<L>  |  12  ----------------------------<  104<H>  4.6   |  30  |  0.60    Ca    9.2      23 Mar 2023 04:58  Phos  4.3     03-23  Mg     1.90     03-23    TPro  6.1  /  Alb  3.4  /  TBili  0.2  /  DBili  <0.2  /  AST  23  /  ALT  44<H>  /  AlkPhos  109  03-23    MICROBIOLOGY:    .Tissue LEFT LOWER LEG PUNCH BIOPSY  03-21-23   Few Methicillin Resistant Staphylococcus aureus  Few Argelia albicans "Susceptibilities not performed"  --  Methicillin resistant Staphylococcus aureus    Clean Catch Clean Catch (Midstream)  03-15-23   No growth  --  --    .Blood Blood-Peripheral  03-15-23   No Growth Final  --  --    .Blood Blood-Peripheral  03-15-23   No Growth Final  --  --    RADIOLOGY:    3/15 CT:    IMPRESSION:  Left buttock cellulitis without evidence of abscess. Consider MRI to   evaluate for perianal fistula.

## 2023-03-24 NOTE — PROGRESS NOTE ADULT - ASSESSMENT
57 year old with schizophrenia, hypogammaglobulinemia on IVIG, presents with a change in behavior  Noted to have a left gluteal wound  Advised that at [facility] (OSH) patient was found to have MRSA Bacteremia 3/12  3/15 BCXs NGTD  Source MRSA bacteremia--buttock abscess/wound?  Wound culture with MRSA/candida (candida likely not significant)    1) Left Buttock wound  Imaging and history raise concern for an underlying fistula  Appreciate surgery input and I+D procedure; F/U surgery  Vanco as below  S/p course Cefepime/flagyl  Wound care eval    2) MRSA Bacteremia  - OSH positive BCX as above  - Vanco 1500mg q 8, monitor levels--through 4/11/23  - TTE generally reassuring, hold on LINDA for now  - F/U pending bCXs  - May be challenging disposition given underlying psych concerns/reliability; anticipate would need facility placement to complete regimen, likely needs PICC line for prolonged course vanco (but doubt patient would be able to manage PICC line/antibiotics at home by himself)    3) Transaminitis  Check acute hepatitis panel  Trend    4) CVID  Check immuoglobulins  On imunoglobulin treatment monthly    Josr Downs MD  Contact on TEAMS messaging from 9am - 5pm  From 5pm-9am, on weekends, or if no response call [phone]

## 2023-03-24 NOTE — PROVIDER CONTACT NOTE (OTHER) - ASSESSMENT
vancomycin needed until 4/11/23. vancomycin having an osmolarity over 900 making it a medication needing to be monitored with caution through a Midline for a duration over 7 days.

## 2023-03-24 NOTE — PROGRESS NOTE ADULT - ASSESSMENT
Assessment/Plan:      Continue low airloss support surface.  Continue to turn and position every 2 hours with z-roz fluidized positioning device.  Continue use of Complete Cair pressure offloading boots.  Continue Nutritional management as per RD recommendations.    Upon discharge follow up at outpatient [facility]. [address] [place] [address]. [phone].    Will continue to follow while inpatient.  Thank you.    MADELAINE Juarez-BC, CWN   pager #[phone]    If after 4PM or before 7:30AM on Mon-Friday or weekend/holiday please contact general surgery for urgent matters.   Team A- [id number]   Team B- [id number]  For non-urgent matters e-mail [email or web address]    I/We spent 30 minutes face-to-face with this patient of which more than 50% of the time was spent counseling/coordinating care of this patient.       Assessment/Plan: 56 y/o M with hx of multiple recent admissions of LLE cellulitis, hypogammaglobulinemia (on monthly IVIG), schizoaffective d/o (on haldol and Paxil) who was brought in by sister for bizarre behavior/delusional thinking, found to have significant cellulitis of the left buttocks with ulceration. no signs of nec fascitis at this time and no evidence of drainable abscess. Behavioral health following patient for agitation and psychosis prior to hospitalization.     Wound Consult requested to assist w/ management of left buttock wound s/p I&D by general surgery on 3/17 to the level of subcutaneous tissue, as per general surgery notes no obvious fistula. Patient known to wound care surgical team, last seen on 2/6/23 for chronic left leg wound. Previously treated with Medihoney/silicone foam, with recommendations for outpatient biopsy. Now recommendations for betadine. Seen by Dermatology on 3/22; s/p punch biopsy and culture.     Sacrum to left buttock full thickness wound, abscess s/p I&D by general surgery on 3/17  - 3/15/23 CT of the ABD and pelvis: Left buttock cellulitis without evidence of abscess. Consider MRI to evaluate for perianal fistula. Remainder of findings as per primary team   - 3/17/23 s/p I&D by general surgery, per notes three punctate holes were noted two of which probed to larger cavities with purulent drainage.    - DDX unlikely pressure as patient ambulates, could be moisture related complicated by patient scratching wound? less likely malignant wound as wound is acute vs. atypical wound   - On today's exam; tissue type stable; one punctate hole noted at wound center, unable to probe deep tunnel today; previously extended 7cm now able to probe 1cm. Periwound skin with punctate ulceration at 1 o'clock with tunnel extending towards larger wound 1.5cm.  - No purulent drainage; (+) Small serosanguinous drainage.   - Wound edges well defined, irregular borders.   - Periwound skin with + blanchable erythema circumferentially with mild induration extending 1-2.5cm with 2.5cm from 9-11 'clock; nontender to touch. (+) resolving cellulitis.   - No crepitus, no fluctuance, no increased warmth, no edema.  - Monitor for tissue type changes; unable to probe depth of tunnel previously recorded; monitor for worsening induration, erythema, tenderness; may be prone to recollection if tunnel prematurely closed with cavity; minimal palpable dead space, no fluctuance/crepitus notes as stated above.  - Discussed s/s to monitor for with patient, educated pt to return to hospital if fever/chills, tenderness, purulent drainage is noted. Was able to teach back what was discussed.  - Followed by ID s/p Flagyl and cefepime. Receiving vancomycin as per ID.   -Topical Recommendations: Sacrum to left upper buttock wound:  Clean wound and periwound skin with Dakins 1/4 strength. Pat dry. Apply liquid barrier film to periwound skin, Lightly pack depth and dead space Aquacel Ag ribbon, leave 2 inches out to wick, cover with silicone foam with border. Change daily.  -Continue to encourage pressure offloading; patient independently mobile and ambulatory    Left leg chronic wound, atypical (per patient present 30 + years)   - S/p Derm eval   - Punch biopsy with wound culture sent by derm? Wound culture Few Methicillin Resistant Staphylococcus aureus, Few Candida albicans   - F/u tissue biopsy  - Primary team, ID made aware of +MRSA in wound; patient placed on contact isolation precautions.  - Per records pt treated at [facility] (OSH) for MRSA Bacteremia 3/12 until he left AMA; blood cultures on admission to [facility] 3/15 with NGTD.    - Remains on Vancomycin per ID.  - Appreciated dermatology recommendations.  - In setting of MRSA will switch from betadine to topical silver.    - Wound base stable, viable granular base, no purulent drainage, no odor. Wound edges raised hyperpigmented, no associated cellulitis.  - Topical recommendations: Cleanse with normal saline, pat dry. Apply Liquid barrier film to periwound skin. If able to obtain apply Acticoat Flex (must dampen first) to wound base, cover with silicone foam with border. Change every other day. If unable to obtain Acticoat apply Aquacel Ag hydrofiber, cover with silicone foam with border, change every other day.    MRSA Bacteremia  - Per records dx at outside hospital/[facility] 3/12, left AMA  - Blood culture here 3/15 NGTD  - WBC WNL, afebrile, not ill appearing.  - LLE + MRSA and candida albicans  - per records history of left arm abscess 1/11/22, + staph aureus.  - ID following  - mgmt per primary team/ID    Hyperglycemia - 6.3% (Feb 05 2023)  -Management as per primary team     Additional recommendations   Bilateral heels and great toes dry thicken skin/callus: Clean with soap & water. Pat dry. Apply ATRACT TAIN moisture cream twice a day. Avoid between toes  Appreciated nutrition recs for wound healing optimization  Continue w/ low air loss fluidized bed surface   Continue to encourage turning and positioning   Continue to encourage w/ Pericare per protocol  Waffle Cushion to chair when oob to chair    Upon discharge f/u as outpatient at [facility] [address] [facility] [phone]  Findings and plan discussed in detail with Dr. Diaz and with patient, primary RN, Nurse manager and primary team GERMAN Sharp. All questions and concerns addressed to meet patient's satisfaction.    Will continue to follow periodiaclly while inpatient.  Thank you.    MADELAINE Juarez-BC, CWN   pager #[phone]    If after 4PM or before 7:30AM on Mon-Friday or weekend/holiday please contact general surgery for urgent matters.   Team A- [id number]   Team B- [id number]  For non-urgent matters e-mail [email or web address]    I spent 35 minutes face-to-face with this patient of which more than 50% of the time was spent counseling/coordinating care of this patient.

## 2023-03-24 NOTE — PROGRESS NOTE ADULT - SUBJECTIVE AND OBJECTIVE BOX
Placed on contact precautions due to preliminary MRSA growth from tissue culture  Lt UE midline placed earlier today    Vital Signs Last 24 Hrs  T(C): 36.2 (24 Mar 2023 11:37), Max: 36.8 (24 Mar 2023 04:25)  T(F): 97.2 (24 Mar 2023 11:37), Max: 98.3 (24 Mar 2023 04:25)  HR: 71 (24 Mar 2023 11:37) (71 - 81)  BP: 159/85 (24 Mar 2023 11:37) (132/80 - 159/85)  BP(mean): --  RR: 17 (24 Mar 2023 04:25) (17 - 17)  SpO2: 100% (24 Mar 2023 11:37) (100% - 100%)    I&O's Summary    03-24-23 @ 07:01  -  03-24-23 @ 17:39  --------------------------------------------------------  IN: 0 mL / OUT: 550 mL / NET: -550 mL        PHYSICAL EXAM:  GENERAL: NAD, well-developed, comfortable  HEAD:  NCAT, EOMI  NECK: Supple, No JVD  CHEST/LUNG: mild decrease breath sounds bilaterally; No wheeze   HEART: Regular rate and rhythm; No murmurs, rubs, or gallops  ABDOMEN: Soft, Nontender, Nondistended; Bowel sounds present  NEURO: AAOx3, understands why he is admitted, able to move all four extremities spontaneously  Skin: left buttocks: erythematous, indurated, with shallow ulceration (non bleeding/draining) about 4-5 cm, indurated, no fluctuance noted. LLE calf: non healing bleeding wound about 2cm diameter, no superimposed infection  Exts: (+)lt UE midline    LABS:                        14.2   6.54  )-----------( 208      ( 23 Mar 2023 04:58 )             44.8     03-23    129<L>  |  91<L>  |  12  ----------------------------<  104<H>  4.6   |  30  |  0.60    Ca    9.2      23 Mar 2023 04:58  Phos  4.3     03-23  Mg     1.90     03-23    TPro  6.1  /  Alb  3.4  /  TBili  0.2  /  DBili  <0.2  /  AST  23  /  ALT  44<H>  /  AlkPhos  109  03-23      CAPILLARY BLOOD GLUCOSE      POCT Blood Glucose.: 133 mg/dL (24 Mar 2023 16:46)  POCT Blood Glucose.: 89 mg/dL (24 Mar 2023 11:25)  POCT Blood Glucose.: 128 mg/dL (24 Mar 2023 07:29)    CARDIAC MARKERS ( 23 Mar 2023 04:58 )  x     / x     / 47 U/L / x     / x              RADIOLOGY & ADDITIONAL TESTS:    Imaging Personally Reviewed:  [x] YES  [ ] NO    Case discussed with NPP:  [X] YES  [ ] NO

## 2023-03-24 NOTE — CHART NOTE - NSCHARTNOTEFT_GEN_A_CORE
PA spoke with Infectious disease. Isolation precautions ordered for +MRSA culture from punch biopsy.    Midline was placed for IV abx administration to help facilitate discharge today. However, discharge now delayed due to +MRSA results. SW/LA aware. Spoke with IV Nurse concerning whether Midline should be removed at this time. As per conversation, okay to maintain midline at this time.    Aron Mancera PA-C,   Internal Medicine ACP   In house pager #[phone]

## 2023-03-24 NOTE — ADVANCED PRACTICE NURSE CONSULT - ASSESSMENT
Patient is aware and alert. Midline insertion along with risks, benefits, possible complications and infection prevention explained to patient who verbalized understanding. All questions addressed and patient gave verbal consent to place midline. Left arm cleansed with CHG. Using sterile technique under ultra sound guidance, placed PowerGlide Pro Midline 20G /10cm into Left Basilic vein. Brisk blood return and flushed with 20Mls of normal saline. Minimal blood loss and patient tolerated procedure well. CHG dressing placed. All sharps accounted for. Report given to district RN and ordering provider.

## 2023-03-24 NOTE — CHART NOTE - NSCHARTNOTEFT_GEN_A_CORE
Prelim tissue culture positive for few MRSA and candida albicans species noted. Pending final results

## 2023-03-24 NOTE — CHART NOTE - NSCHARTNOTEFT_GEN_A_CORE
PA spoke to Sister Brittany Arias  to provide updates on +MRSA and contact isolation protocol. ACP provided comprehensive clinical updates and answered all questions at this time.    Aron Mancera PA-C,   Internal Medicine ACP   In house pager #[phone]

## 2023-03-24 NOTE — PROGRESS NOTE ADULT - SUBJECTIVE AND OBJECTIVE BOX
[facility]-- WOUND TEAM -- FOLLOW UP NOTE  --------------------------------------------------------------------------------    subjective: Pt lying on right side comfortably in bed. Reports that pain/discomfort to his backside wound has improved, reports no associated pain with LLE wound. No purulent discharge or noted from either wounds. Reports adequate diet. Denies SOB, CP, fever, chills, n/v, diarrhea.     Interval HPI/24 hour events:   3/22 seen by dermatology for atypical LLE wound  3/24 LUE midline placed    Chart reviewed including labs and relevant images      Diet:  Diet, Regular:   Consistent Carbohydrate Evening Snack (CSTCHOSN)  800mL Fluid Restriction ([id number])  No Carb Prosource TF     Qty per Day:  1 (03-17-23 @ 18:00)      ROS: General, skin see above  All other systems negative.     ALLERGIES & MEDICATIONS  --------------------------------------------------------------------------------  Allergies    amoxicillin (Fever)  penicillin (Rash)    Intolerances          STANDING INPATIENT MEDICATIONS    albuterol    90 MICROgram(s) HFA Inhaler 2 Puff(s) Inhalation every 6 hours  ascorbic acid 500 milliGRAM(s) Oral daily  cholecalciferol 2000 Unit(s) Oral daily  Dakins Solution - 1/4 Strength 1 Application(s) Topical two times a day  diltiazem    milliGRAM(s) Oral daily  enoxaparin Injectable 40 milliGRAM(s) SubCutaneous every 24 hours  losartan 100 milliGRAM(s) Oral daily  multivitamin 1 Tablet(s) Oral daily  nicotine - 21 mG/24Hr(s) Patch 1 Patch Transdermal daily  tiotropium 2.5 MICROgram(s) Inhaler 2 Puff(s) Inhalation daily  vancomycin  IVPB 1500 milliGRAM(s) IV Intermittent every 8 hours      PRN INPATIENT MEDICATION  acetaminophen     Tablet .. 650 milliGRAM(s) Oral every 6 hours PRN  albuterol/ipratropium for Nebulization 3 milliLiter(s) Nebulizer every 6 hours PRN  aluminum hydroxide/magnesium hydroxide/simethicone Suspension 30 milliLiter(s) Oral every 4 hours PRN  hydrOXYzine hydrochloride 50 milliGRAM(s) Oral at bedtime PRN  melatonin 3 milliGRAM(s) Oral at bedtime PRN  nicotine  Polacrilex Lozenge 4 milliGRAM(s) Oral every 2 hours PRN  ondansetron Injectable 4 milliGRAM(s) IV Push every 8 hours PRN        Vital signs:  T(C): 36.2 (03-24-23 @ 11:37), Max: 36.8 (03-24-23 @ 04:25)  HR: 71 (03-24-23 @ 11:37) (71 - 81)  BP: 159/85 (03-24-23 @ 11:37) (132/80 - 159/85)  RR: 17 (03-24-23 @ 04:25) (17 - 17)  SpO2: 100% (03-24-23 @ 11:37) (100% - 100%)  Wt(kg): 128 kg (22-Mar-23)        03-24-23 @ 07:01  -  03-24-23 @ 13:18  --------------------------------------------------------  IN: 0 mL / OUT: 550 mL / NET: -550 mL      PHYSICAL EXAM:   Constitutional: A&O x 4, resting comfortably.  HEENMT: non icteric, nares clear, moist mucosa  Back:   Sacrum to left buttock- known abscess- s/p I&D by general surgery on 3/17- 3.3hoz6mg with deeper central ulceration extending 1cm. Tunneling from 5-6 towards left perirectal extends 7.5cm. Satellite ulceration at 1 o'clock 0.5cm from wound edge, [id number]. Both bases with tan thin translucent film overlying pink moist adipose. No purulent drainage, no odor. Small serofibrinous drainage. Wound edges well defined, irregular borders. Periwound skin with + blancheable erythema circumferentially with mild induration extending 1-4cm with 4cm from 7- 11 'clock;  (+) cellulitis. No obvious fistula observed. No crepitus, no fluctuance. Packed wound with Dakins 1/4 strength, wet to dry, cover with gauze and tegaderm.   Respiratory: NAD, room air   Cardiovascular: rate regular  Gastrointestinal: non tender, obese   Vascular: Long toenails. No edema. No temperature changes. No signs of overt ischemia. DP +2 pulses. thick callous on heels and bilateral great toe.   Left leg chronic wound atypical? Unclear  etiology- 1.8cmx1.5cmx0.4cm (prev 2cmx1.5cmx0.4cm). Tissue type exposing 100% red granular/hypergranular tissue. Scant serosanguinous drainage. No odor. No associated cellulitis. Periwound skin raised hyperpigmented edges extending 1cm circumferentially, no fluctuance. No tenderness. Aquacel Ag/Silicone foam with border applied.  Skin: as noted  Risk for moisture associated dermatitis to lower buttocks and perineum, no open ulcers.   Musculoskeletal: Able to move all extremities   psych: calm, cooperative      LABS/ CULTURES/ RADIOLOGY:              14.2   6.54  >-----------<  208      [03-23-23 @ 04:58]              44.8     129  |  91  |  12  ----------------------------<  104      [03-23-23 @ 04:58]  4.6   |  30  |  0.60        Ca     9.2     [03-23-23 @ 04:58]      Mg     1.90     [03-23-23 @ 04:58]      Phos  4.3     [03-23-23 @ 04:58]    TPro  6.1  /  Alb  3.4  /  TBili  0.2  /  DBili  <0.2  /  AST  23  /  ALT  44  /  AlkPhos  109  [03-23-23 @ 04:58]        CK 47      [03-23-23 @ 04:58]        CAPILLARY BLOOD GLUCOSE      POCT Blood Glucose.: 89 mg/dL (24 Mar 2023 11:25)  POCT Blood Glucose.: 128 mg/dL (24 Mar 2023 07:29)  POCT Blood Glucose.: 141 mg/dL (23 Mar 2023 16:38)      A1C with Estimated Average Glucose Result: 6.3 % (02-05-23 @ 10:43)      Culture - Tissue with Gram Stain (03.21.23 @ 18:54)   - Vancomycin: S 1   Gram Stain:   No polymorphonuclear leukocytes seen per low power field   No organisms seen per oil power field   - Ampicillin/Sulbactam: R <=8/4   - Cefazolin: R <=4   - Clindamycin: R >4   - Daptomycin: S 0.5   - Erythromycin: R >4   - Gentamicin: S <=1 Should not be used as monotherapy   - Linezolid: S 2   - Oxacillin: R >2   - Penicillin: R >8   - Rifampin: S <=1 Should not be used as monotherapy   - Tetracycline: R >8   - Trimethoprim/Sulfamethoxazole: S <=0.5/9.5   Specimen Source: .Tissue LEFT LOWER LEG PUNCH BIOPSY   Culture Results:   Few Methicillin Resistant Staphylococcus aureus   Few Argelia albicans "Susceptibilities not performed"   Organism Identification: Methicillin resistant Staphylococcus aureus   Organism: Methicillin resistant Staphylococcus aureus   Method Type: HATTIE    [facility]-- WOUND TEAM -- FOLLOW UP NOTE  --------------------------------------------------------------------------------    subjective: Pt lying on right side comfortably in bed. Reports that pain/discomfort to his backside wound has improved, reports no associated pain with LLE wound. No purulent discharge or noted from either wounds. Reports adequate diet. Denies SOB, CP, fever, chills, n/v, diarrhea.     Interval HPI/24 hour events:   3/22 seen by dermatology for atypical LLE wound  3/24 LUE midline placed    Chart reviewed including labs and relevant images      Diet:  Diet, Regular:   Consistent Carbohydrate Evening Snack (CSTCHOSN)  800mL Fluid Restriction ([id number])  No Carb Prosource TF     Qty per Day:  1 (03-17-23 @ 18:00)      ROS: General, skin see above  All other systems negative.     ALLERGIES & MEDICATIONS  --------------------------------------------------------------------------------  Allergies    amoxicillin (Fever)  penicillin (Rash)    Intolerances          STANDING INPATIENT MEDICATIONS    albuterol    90 MICROgram(s) HFA Inhaler 2 Puff(s) Inhalation every 6 hours  ascorbic acid 500 milliGRAM(s) Oral daily  cholecalciferol 2000 Unit(s) Oral daily  Dakins Solution - 1/4 Strength 1 Application(s) Topical two times a day  diltiazem    milliGRAM(s) Oral daily  enoxaparin Injectable 40 milliGRAM(s) SubCutaneous every 24 hours  losartan 100 milliGRAM(s) Oral daily  multivitamin 1 Tablet(s) Oral daily  nicotine - 21 mG/24Hr(s) Patch 1 Patch Transdermal daily  tiotropium 2.5 MICROgram(s) Inhaler 2 Puff(s) Inhalation daily  vancomycin  IVPB 1500 milliGRAM(s) IV Intermittent every 8 hours      PRN INPATIENT MEDICATION  acetaminophen     Tablet .. 650 milliGRAM(s) Oral every 6 hours PRN  albuterol/ipratropium for Nebulization 3 milliLiter(s) Nebulizer every 6 hours PRN  aluminum hydroxide/magnesium hydroxide/simethicone Suspension 30 milliLiter(s) Oral every 4 hours PRN  hydrOXYzine hydrochloride 50 milliGRAM(s) Oral at bedtime PRN  melatonin 3 milliGRAM(s) Oral at bedtime PRN  nicotine  Polacrilex Lozenge 4 milliGRAM(s) Oral every 2 hours PRN  ondansetron Injectable 4 milliGRAM(s) IV Push every 8 hours PRN        Vital signs:  T(C): 36.2 (03-24-23 @ 11:37), Max: 36.8 (03-24-23 @ 04:25)  HR: 71 (03-24-23 @ 11:37) (71 - 81)  BP: 159/85 (03-24-23 @ 11:37) (132/80 - 159/85)  RR: 17 (03-24-23 @ 04:25) (17 - 17)  SpO2: 100% (03-24-23 @ 11:37) (100% - 100%)  Wt(kg): 128 kg (22-Mar-23)        03-24-23 @ 07:01  -  03-24-23 @ 13:18  --------------------------------------------------------  IN: 0 mL / OUT: 550 mL / NET: -550 mL      PHYSICAL EXAM:   Constitutional: A&O x 4, resting comfortably.  HEENMT: non icteric, nares clear, moist mucosa  Back:   Sacrum to left buttock- known abscess- s/p I&D by general surgery on 3/17- 3.8pni3kz with deeper central ulceration extending 1cm. Able to probe tunnel from 5-6 o'clock with Q-tip 1cm (previously extending 7cm, unable to advance Q-tip deeper today). Satellite ulceration at 1 o'clock 0.5cm from wound edge, 0wbr1xpz5.2cm, with tunnel at 7 o'clock towards larger wound extending 1.5cm. Both bases with tan thin translucent film overlying pink moist adipose. No purulent drainage, no odor. Small serosanguinous drainage. Wound edges well defined, irregular borders. Periwound skin with + blanchable erythema circumferentially with mild induration extending 1-2.5cm with 2.5cm from 9-11 'clock;  (+) resolving cellulitis. No obvious fistula observed. No crepitus, no fluctuance. Cleansed wound with Dakins 1/4 strength. Silicone foam applied.  Respiratory: NAD, room air   Cardiovascular: rate regular  Gastrointestinal: non tender, obese   Vascular: Long toenails. No edema. No temperature changes. No signs of overt ischemia. DP +2 pulses. thick callous on heels and bilateral great toe.   Left leg chronic wound atypical? Unclear etiology- 1.8cmx1.5cmx0.4cm (prev 2cmx1.5cmx0.4cm). Tissue type exposing 100% red granular/hypergranular tissue. Scant serosanguinous drainage. No odor. No associated cellulitis. Periwound skin raised hyperpigmented edges extending 1cm circumferentially, no fluctuance. No tenderness. Aquacel Ag/Silicone foam with border applied.  Skin: as noted  Risk for moisture associated dermatitis to lower buttocks and perineum, no open ulcers.   Musculoskeletal: Able to move all extremities   psych: calm, cooperative      LABS/ CULTURES/ RADIOLOGY:              14.2   6.54  >-----------<  208      [03-23-23 @ 04:58]              44.8     129  |  91  |  12  ----------------------------<  104      [03-23-23 @ 04:58]  4.6   |  30  |  0.60        Ca     9.2     [03-23-23 @ 04:58]      Mg     1.90     [03-23-23 @ 04:58]      Phos  4.3     [03-23-23 @ 04:58]    TPro  6.1  /  Alb  3.4  /  TBili  0.2  /  DBili  <0.2  /  AST  23  /  ALT  44  /  AlkPhos  109  [03-23-23 @ 04:58]        CK 47      [03-23-23 @ 04:58]        CAPILLARY BLOOD GLUCOSE      POCT Blood Glucose.: 89 mg/dL (24 Mar 2023 11:25)  POCT Blood Glucose.: 128 mg/dL (24 Mar 2023 07:29)  POCT Blood Glucose.: 141 mg/dL (23 Mar 2023 16:38)      A1C with Estimated Average Glucose Result: 6.3 % (02-05-23 @ 10:43)      Culture - Tissue with Gram Stain (03.21.23 @ 18:54)   - Vancomycin: S 1   Gram Stain:   No polymorphonuclear leukocytes seen per low power field   No organisms seen per oil power field   - Ampicillin/Sulbactam: R <=8/4   - Cefazolin: R <=4   - Clindamycin: R >4   - Daptomycin: S 0.5   - Erythromycin: R >4   - Gentamicin: S <=1 Should not be used as monotherapy   - Linezolid: S 2   - Oxacillin: R >2   - Penicillin: R >8   - Rifampin: S <=1 Should not be used as monotherapy   - Tetracycline: R >8   - Trimethoprim/Sulfamethoxazole: S <=0.5/9.5   Specimen Source: .Tissue LEFT LOWER LEG PUNCH BIOPSY   Culture Results:   Few Methicillin Resistant Staphylococcus aureus   Few Argelia albicans "Susceptibilities not performed"   Organism Identification: Methicillin resistant Staphylococcus aureus   Organism: Methicillin resistant Staphylococcus aureus   Method Type: HATTIE

## 2023-03-24 NOTE — PROVIDER CONTACT NOTE (OTHER) - RECOMMENDATIONS
speak with accepting facility to discuss there policies regarding vancomycin through a midline vs PICC line.

## 2023-03-25 LAB
ANION GAP SERPL CALC-SCNC: 11 MMOL/L — SIGNIFICANT CHANGE UP (ref 7–14)
BUN SERPL-MCNC: 12 MG/DL — SIGNIFICANT CHANGE UP (ref 7–23)
CALCIUM SERPL-MCNC: 9.1 MG/DL — SIGNIFICANT CHANGE UP (ref 8.4–10.5)
CHLORIDE SERPL-SCNC: 93 MMOL/L — LOW (ref 98–107)
CO2 SERPL-SCNC: 27 MMOL/L — SIGNIFICANT CHANGE UP (ref 22–31)
CREAT SERPL-MCNC: 0.62 MG/DL — SIGNIFICANT CHANGE UP (ref 0.5–1.3)
EGFR: 113 ML/MIN/1.73M2 — SIGNIFICANT CHANGE UP
GLUCOSE SERPL-MCNC: 138 MG/DL — HIGH (ref 70–99)
HCT VFR BLD CALC: 41.3 % — SIGNIFICANT CHANGE UP (ref 39–50)
HGB BLD-MCNC: 13.4 G/DL — SIGNIFICANT CHANGE UP (ref 13–17)
MAGNESIUM SERPL-MCNC: 1.9 MG/DL — SIGNIFICANT CHANGE UP (ref 1.6–2.6)
MCHC RBC-ENTMCNC: 25.7 PG — LOW (ref 27–34)
MCHC RBC-ENTMCNC: 32.4 GM/DL — SIGNIFICANT CHANGE UP (ref 32–36)
MCV RBC AUTO: 79.1 FL — LOW (ref 80–100)
NRBC # BLD: 0 /100 WBCS — SIGNIFICANT CHANGE UP (ref 0–0)
NRBC # FLD: 0 K/UL — SIGNIFICANT CHANGE UP (ref 0–0)
PHOSPHATE SERPL-MCNC: 4.3 MG/DL — SIGNIFICANT CHANGE UP (ref 2.5–4.5)
PLATELET # BLD AUTO: 231 K/UL — SIGNIFICANT CHANGE UP (ref 150–400)
POTASSIUM SERPL-MCNC: 4.1 MMOL/L — SIGNIFICANT CHANGE UP (ref 3.5–5.3)
POTASSIUM SERPL-SCNC: 4.1 MMOL/L — SIGNIFICANT CHANGE UP (ref 3.5–5.3)
RBC # BLD: 5.22 M/UL — SIGNIFICANT CHANGE UP (ref 4.2–5.8)
RBC # FLD: 15.6 % — HIGH (ref 10.3–14.5)
SODIUM SERPL-SCNC: 131 MMOL/L — LOW (ref 135–145)
WBC # BLD: 5.83 K/UL — SIGNIFICANT CHANGE UP (ref 3.8–10.5)
WBC # FLD AUTO: 5.83 K/UL — SIGNIFICANT CHANGE UP (ref 3.8–10.5)

## 2023-03-25 RX ADMIN — Medication 300 MILLIGRAM(S): at 05:34

## 2023-03-25 RX ADMIN — TIOTROPIUM BROMIDE 2 PUFF(S): 18 CAPSULE ORAL; RESPIRATORY (INHALATION) at 10:00

## 2023-03-25 RX ADMIN — Medication 1 TABLET(S): at 12:12

## 2023-03-25 RX ADMIN — Medication 300 MILLIGRAM(S): at 06:23

## 2023-03-25 RX ADMIN — ALBUTEROL 2 PUFF(S): 90 AEROSOL, METERED ORAL at 16:14

## 2023-03-25 RX ADMIN — Medication 300 MILLIGRAM(S): at 15:29

## 2023-03-25 RX ADMIN — Medication 1 APPLICATION(S): at 17:32

## 2023-03-25 RX ADMIN — Medication 2000 UNIT(S): at 12:11

## 2023-03-25 RX ADMIN — Medication 500 MILLIGRAM(S): at 12:11

## 2023-03-25 RX ADMIN — Medication 300 MILLIGRAM(S): at 21:07

## 2023-03-25 RX ADMIN — Medication 1 PATCH: at 11:19

## 2023-03-25 RX ADMIN — ENOXAPARIN SODIUM 40 MILLIGRAM(S): 100 INJECTION SUBCUTANEOUS at 18:14

## 2023-03-25 RX ADMIN — Medication 1 PATCH: at 10:23

## 2023-03-25 RX ADMIN — LOSARTAN POTASSIUM 100 MILLIGRAM(S): 100 TABLET, FILM COATED ORAL at 05:34

## 2023-03-25 RX ADMIN — Medication 1 APPLICATION(S): at 06:23

## 2023-03-25 RX ADMIN — ALBUTEROL 2 PUFF(S): 90 AEROSOL, METERED ORAL at 10:00

## 2023-03-25 RX ADMIN — ALBUTEROL 2 PUFF(S): 90 AEROSOL, METERED ORAL at 21:06

## 2023-03-25 NOTE — PROGRESS NOTE ADULT - SUBJECTIVE AND OBJECTIVE BOX
Wants to go home    Vital Signs Last 24 Hrs  T(C): 36.5 (24 Mar 2023 21:39), Max: 36.5 (24 Mar 2023 21:39)  T(F): 97.7 (24 Mar 2023 21:39), Max: 97.7 (24 Mar 2023 21:39)  HR: 75 (24 Mar 2023 21:39) (71 - 75)  BP: 142/79 (24 Mar 2023 21:39) (142/79 - 159/85)  BP(mean): --  RR: 17 (24 Mar 2023 21:39) (17 - 17)  SpO2: 100% (24 Mar 2023 21:39) (100% - 100%)    I&O's Summary    03-24-23 @ 07:01  -  03-25-23 @ 07:00  --------------------------------------------------------  IN: 0 mL / OUT: 550 mL / NET: -550 mL        PHYSICAL EXAM:  GENERAL: NAD, well-developed, comfortable  HEAD:  NCAT, EOMI  NECK: Supple, No JVD  CHEST/LUNG: mild decrease breath sounds bilaterally; No wheeze   HEART: Regular rate and rhythm; No murmurs, rubs, or gallops  ABDOMEN: Soft, Nontender, Nondistended; Bowel sounds present  NEURO: AAOx3, understands why he is admitted, able to move all four extremities spontaneously  Skin: left buttocks: erythematous, indurated, with shallow ulceration (non bleeding/draining) about 4-5 cm, indurated, no fluctuance noted. LLE calf: non healing bleeding wound about 2cm diameter, no superimposed infection  Exts: (+)lt UE midline    LABS:                        13.4   5.83  )-----------( 231      ( 25 Mar 2023 06:34 )             41.3     03-25    131<L>  |  93<L>  |  12  ----------------------------<  138<H>  4.1   |  27  |  0.62    Ca    9.1      25 Mar 2023 06:34  Phos  4.3     03-25  Mg     1.90     03-25        CAPILLARY BLOOD GLUCOSE      POCT Blood Glucose.: 118 mg/dL (25 Mar 2023 08:43)  POCT Blood Glucose.: 148 mg/dL (24 Mar 2023 23:17)  POCT Blood Glucose.: 204 mg/dL (24 Mar 2023 22:13)  POCT Blood Glucose.: 133 mg/dL (24 Mar 2023 16:46)  POCT Blood Glucose.: 89 mg/dL (24 Mar 2023 11:25)            RADIOLOGY & ADDITIONAL TESTS:    Imaging Personally Reviewed:  [x] YES  [ ] NO    Case discussed with NPP:  [X] YES  [ ] NO

## 2023-03-25 NOTE — DOWNTIME INTERRUPTION NOTE - WHICH MANUAL FORMS INITIATED?
See paper chart for clinical documentation recorded during [place] Downtime for:     Vital Signs/ Pain Management & I&O's Flowsheet  Accu-Chek Accession Record  [facility] Downtime Form Goal Outcome Evaluation Record

## 2023-03-26 LAB
ANION GAP SERPL CALC-SCNC: 12 MMOL/L — SIGNIFICANT CHANGE UP (ref 7–14)
BUN SERPL-MCNC: 10 MG/DL — SIGNIFICANT CHANGE UP (ref 7–23)
CALCIUM SERPL-MCNC: 9.5 MG/DL — SIGNIFICANT CHANGE UP (ref 8.4–10.5)
CHLORIDE SERPL-SCNC: 94 MMOL/L — LOW (ref 98–107)
CO2 SERPL-SCNC: 26 MMOL/L — SIGNIFICANT CHANGE UP (ref 22–31)
CREAT SERPL-MCNC: 0.67 MG/DL — SIGNIFICANT CHANGE UP (ref 0.5–1.3)
CULTURE RESULTS: SIGNIFICANT CHANGE UP
EGFR: 110 ML/MIN/1.73M2 — SIGNIFICANT CHANGE UP
GLUCOSE SERPL-MCNC: 99 MG/DL — SIGNIFICANT CHANGE UP (ref 70–99)
HCT VFR BLD CALC: 42.7 % — SIGNIFICANT CHANGE UP (ref 39–50)
HGB BLD-MCNC: 13.5 G/DL — SIGNIFICANT CHANGE UP (ref 13–17)
MAGNESIUM SERPL-MCNC: 1.9 MG/DL — SIGNIFICANT CHANGE UP (ref 1.6–2.6)
MCHC RBC-ENTMCNC: 25.2 PG — LOW (ref 27–34)
MCHC RBC-ENTMCNC: 31.6 GM/DL — LOW (ref 32–36)
MCV RBC AUTO: 79.7 FL — LOW (ref 80–100)
NRBC # BLD: 0 /100 WBCS — SIGNIFICANT CHANGE UP (ref 0–0)
NRBC # FLD: 0 K/UL — SIGNIFICANT CHANGE UP (ref 0–0)
ORGANISM # SPEC MICROSCOPIC CNT: SIGNIFICANT CHANGE UP
ORGANISM # SPEC MICROSCOPIC CNT: SIGNIFICANT CHANGE UP
PHOSPHATE SERPL-MCNC: 4.5 MG/DL — SIGNIFICANT CHANGE UP (ref 2.5–4.5)
PLATELET # BLD AUTO: 246 K/UL — SIGNIFICANT CHANGE UP (ref 150–400)
POTASSIUM SERPL-MCNC: 4.4 MMOL/L — SIGNIFICANT CHANGE UP (ref 3.5–5.3)
POTASSIUM SERPL-SCNC: 4.4 MMOL/L — SIGNIFICANT CHANGE UP (ref 3.5–5.3)
RBC # BLD: 5.36 M/UL — SIGNIFICANT CHANGE UP (ref 4.2–5.8)
RBC # FLD: 15.5 % — HIGH (ref 10.3–14.5)
SODIUM SERPL-SCNC: 132 MMOL/L — LOW (ref 135–145)
[facility] SOURCE: SIGNIFICANT CHANGE UP
VANCOMYCIN TROUGH SERPL-MCNC: 14.1 UG/ML — SIGNIFICANT CHANGE UP (ref 10–20)
WBC # BLD: 5.41 K/UL — SIGNIFICANT CHANGE UP (ref 3.8–10.5)
WBC # FLD AUTO: 5.41 K/UL — SIGNIFICANT CHANGE UP (ref 3.8–10.5)

## 2023-03-26 RX ADMIN — ENOXAPARIN SODIUM 40 MILLIGRAM(S): 100 INJECTION SUBCUTANEOUS at 18:12

## 2023-03-26 RX ADMIN — Medication 300 MILLIGRAM(S): at 05:26

## 2023-03-26 RX ADMIN — ALBUTEROL 2 PUFF(S): 90 AEROSOL, METERED ORAL at 17:25

## 2023-03-26 RX ADMIN — LOSARTAN POTASSIUM 100 MILLIGRAM(S): 100 TABLET, FILM COATED ORAL at 05:26

## 2023-03-26 RX ADMIN — ALBUTEROL 2 PUFF(S): 90 AEROSOL, METERED ORAL at 22:22

## 2023-03-26 RX ADMIN — ALBUTEROL 2 PUFF(S): 90 AEROSOL, METERED ORAL at 22:15

## 2023-03-26 RX ADMIN — Medication 300 MILLIGRAM(S): at 09:52

## 2023-03-26 RX ADMIN — Medication 1 APPLICATION(S): at 17:25

## 2023-03-26 RX ADMIN — TIOTROPIUM BROMIDE 2 PUFF(S): 18 CAPSULE ORAL; RESPIRATORY (INHALATION) at 10:33

## 2023-03-26 RX ADMIN — Medication 300 MILLIGRAM(S): at 22:15

## 2023-03-26 RX ADMIN — Medication 1 APPLICATION(S): at 05:11

## 2023-03-26 RX ADMIN — ALBUTEROL 2 PUFF(S): 90 AEROSOL, METERED ORAL at 10:33

## 2023-03-26 RX ADMIN — Medication 2000 UNIT(S): at 11:31

## 2023-03-26 RX ADMIN — Medication 1 TABLET(S): at 11:32

## 2023-03-26 RX ADMIN — Medication 500 MILLIGRAM(S): at 11:31

## 2023-03-26 NOTE — PROGRESS NOTE ADULT - SUBJECTIVE AND OBJECTIVE BOX
No acute pain  No SOB    Vital Signs Last 24 Hrs  T(C): 36.8 (26 Mar 2023 05:18), Max: 37.1 (25 Mar 2023 21:06)  T(F): 98.2 (26 Mar 2023 05:18), Max: 98.8 (25 Mar 2023 21:06)  HR: 82 (26 Mar 2023 05:18) (64 - 82)  BP: 132/84 (26 Mar 2023 05:18) (132/84 - 155/76)  BP(mean): --  RR: 17 (26 Mar 2023 05:18) (17 - 18)  SpO2: 94% (26 Mar 2023 05:18) (94% - 100%)    I&O's Summary    03-25-23 @ 07:01  -  03-26-23 @ 07:00  --------------------------------------------------------  IN: 720 mL / OUT: 0 mL / NET: 720 mL        PHYSICAL EXAM:  GENERAL: NAD, well-developed, comfortable  HEAD:  NCAT, EOMI  NECK: Supple, No JVD  CHEST/LUNG: mild decrease breath sounds bilaterally; No wheeze   HEART: Regular rate and rhythm; No murmurs, rubs, or gallops  ABDOMEN: Soft, Nontender, Nondistended; Bowel sounds present  NEURO: AAOx3, understands why he is admitted, able to move all four extremities spontaneously  Skin: left buttocks: erythematous, indurated, with shallow ulceration (non bleeding/draining) about 4-5 cm, indurated, no fluctuance noted. LLE calf: non healing bleeding wound about 2cm diameter, no superimposed infection  Exts: (+)lt UE midline    LABS:                        13.5   5.41  )-----------( 246      ( 26 Mar 2023 06:00 )             42.7     03-26    132<L>  |  94<L>  |  10  ----------------------------<  99  4.4   |  26  |  0.67    Ca    9.5      26 Mar 2023 06:00  Phos  4.5     03-26  Mg     1.90     03-26        CAPILLARY BLOOD GLUCOSE      POCT Blood Glucose.: 105 mg/dL (26 Mar 2023 07:18)  POCT Blood Glucose.: 150 mg/dL (25 Mar 2023 16:55)  POCT Blood Glucose.: 91 mg/dL (25 Mar 2023 11:35)  POCT Blood Glucose.: 118 mg/dL (25 Mar 2023 08:43)            RADIOLOGY & ADDITIONAL TESTS:    Imaging Personally Reviewed:  [x] YES  [ ] NO    Case discussed with NPP:  [X] YES  [ ] NO

## 2023-03-27 ENCOUNTER — NON-APPOINTMENT (OUTPATIENT)
Age: 58
End: 2023-03-27

## 2023-03-27 LAB
ANION GAP SERPL CALC-SCNC: 10 MMOL/L — SIGNIFICANT CHANGE UP (ref 7–14)
BUN SERPL-MCNC: 10 MG/DL — SIGNIFICANT CHANGE UP (ref 7–23)
CALCIUM SERPL-MCNC: 9.4 MG/DL — SIGNIFICANT CHANGE UP (ref 8.4–10.5)
CHLORIDE SERPL-SCNC: 93 MMOL/L — LOW (ref 98–107)
CO2 SERPL-SCNC: 28 MMOL/L — SIGNIFICANT CHANGE UP (ref 22–31)
CREAT SERPL-MCNC: 0.7 MG/DL — SIGNIFICANT CHANGE UP (ref 0.5–1.3)
EGFR: 109 ML/MIN/1.73M2 — SIGNIFICANT CHANGE UP
GLUCOSE SERPL-MCNC: 95 MG/DL — SIGNIFICANT CHANGE UP (ref 70–99)
HCT VFR BLD CALC: 40.7 % — SIGNIFICANT CHANGE UP (ref 39–50)
HGB BLD-MCNC: 13.3 G/DL — SIGNIFICANT CHANGE UP (ref 13–17)
MAGNESIUM SERPL-MCNC: 1.9 MG/DL — SIGNIFICANT CHANGE UP (ref 1.6–2.6)
MCHC RBC-ENTMCNC: 26.1 PG — LOW (ref 27–34)
MCHC RBC-ENTMCNC: 32.7 GM/DL — SIGNIFICANT CHANGE UP (ref 32–36)
MCV RBC AUTO: 80 FL — SIGNIFICANT CHANGE UP (ref 80–100)
NRBC # BLD: 0 /100 WBCS — SIGNIFICANT CHANGE UP (ref 0–0)
NRBC # FLD: 0 K/UL — SIGNIFICANT CHANGE UP (ref 0–0)
PHOSPHATE SERPL-MCNC: 4.3 MG/DL — SIGNIFICANT CHANGE UP (ref 2.5–4.5)
PLATELET # BLD AUTO: 241 K/UL — SIGNIFICANT CHANGE UP (ref 150–400)
POTASSIUM SERPL-MCNC: 4.3 MMOL/L — SIGNIFICANT CHANGE UP (ref 3.5–5.3)
POTASSIUM SERPL-SCNC: 4.3 MMOL/L — SIGNIFICANT CHANGE UP (ref 3.5–5.3)
RBC # BLD: 5.09 M/UL — SIGNIFICANT CHANGE UP (ref 4.2–5.8)
RBC # FLD: 15.6 % — HIGH (ref 10.3–14.5)
SODIUM SERPL-SCNC: 131 MMOL/L — LOW (ref 135–145)
WBC # BLD: 4.66 K/UL — SIGNIFICANT CHANGE UP (ref 3.8–10.5)
WBC # FLD AUTO: 4.66 K/UL — SIGNIFICANT CHANGE UP (ref 3.8–10.5)

## 2023-03-27 PROCEDURE — 93010 ELECTROCARDIOGRAM REPORT: CPT

## 2023-03-27 PROCEDURE — 99232 SBSQ HOSP IP/OBS MODERATE 35: CPT

## 2023-03-27 PROCEDURE — 99233 SBSQ HOSP IP/OBS HIGH 50: CPT

## 2023-03-27 RX ADMIN — Medication 300 MILLIGRAM(S): at 14:11

## 2023-03-27 RX ADMIN — Medication 500 MILLIGRAM(S): at 11:22

## 2023-03-27 RX ADMIN — Medication 300 MILLIGRAM(S): at 05:24

## 2023-03-27 RX ADMIN — Medication 1 PATCH: at 11:21

## 2023-03-27 RX ADMIN — Medication 1 PATCH: at 19:50

## 2023-03-27 RX ADMIN — Medication 3 MILLIGRAM(S): at 23:19

## 2023-03-27 RX ADMIN — Medication 1 APPLICATION(S): at 05:23

## 2023-03-27 RX ADMIN — Medication 2000 UNIT(S): at 11:21

## 2023-03-27 RX ADMIN — Medication 300 MILLIGRAM(S): at 05:22

## 2023-03-27 RX ADMIN — Medication 1 TABLET(S): at 11:20

## 2023-03-27 RX ADMIN — Medication 1 APPLICATION(S): at 17:21

## 2023-03-27 RX ADMIN — LOSARTAN POTASSIUM 100 MILLIGRAM(S): 100 TABLET, FILM COATED ORAL at 05:24

## 2023-03-27 RX ADMIN — ALBUTEROL 2 PUFF(S): 90 AEROSOL, METERED ORAL at 23:06

## 2023-03-27 RX ADMIN — ALBUTEROL 2 PUFF(S): 90 AEROSOL, METERED ORAL at 10:10

## 2023-03-27 RX ADMIN — Medication 300 MILLIGRAM(S): at 23:05

## 2023-03-27 RX ADMIN — TIOTROPIUM BROMIDE 2 PUFF(S): 18 CAPSULE ORAL; RESPIRATORY (INHALATION) at 10:10

## 2023-03-27 RX ADMIN — ENOXAPARIN SODIUM 40 MILLIGRAM(S): 100 INJECTION SUBCUTANEOUS at 18:12

## 2023-03-27 NOTE — PROGRESS NOTE ADULT - SUBJECTIVE AND OBJECTIVE BOX
INTERVAL HPI/OVERNIGHT EVENTS:    S:  Patient is a 57y Male Patient is a 57y old  Male who presents with a chief complaint of cellulitis, delusions (27 Mar 2023 09:34)      **No overnight events.**    Lesions on the leg and buttocks improving. Patient asking when he can go home.    REVIEW OF SYSTEMS      General: no fevers/chills, no NS	    Skin: see HPI  	  Ophthalmologic: no eye pain or change in vision    Genitourinary: no dysuria or hematuria    Musculoskeletal: no joint pains or weakness	    Neurological:no weakness or tingling        ROS negative except if noted in the above subjective text. All other systems reviewed and negative.    MEDICATIONS  (STANDING):  albuterol    90 MICROgram(s) HFA Inhaler 2 Puff(s) Inhalation every 6 hours  ascorbic acid 500 milliGRAM(s) Oral daily  cholecalciferol 2000 Unit(s) Oral daily  Dakins Solution - 1/4 Strength 1 Application(s) Topical two times a day  diltiazem    milliGRAM(s) Oral daily  enoxaparin Injectable 40 milliGRAM(s) SubCutaneous every 24 hours  losartan 100 milliGRAM(s) Oral daily  multivitamin 1 Tablet(s) Oral daily  nicotine - 21 mG/24Hr(s) Patch 1 Patch Transdermal daily  tiotropium 2.5 MICROgram(s) Inhaler 2 Puff(s) Inhalation daily  vancomycin  IVPB 1500 milliGRAM(s) IV Intermittent every 8 hours    MEDICATIONS  (PRN):  acetaminophen     Tablet .. 650 milliGRAM(s) Oral every 6 hours PRN Temp greater or equal to 38C (100.4F), Mild Pain (1 - 3)  albuterol/ipratropium for Nebulization 3 milliLiter(s) Nebulizer every 6 hours PRN Shortness of Breath  aluminum hydroxide/magnesium hydroxide/simethicone Suspension 30 milliLiter(s) Oral every 4 hours PRN Dyspepsia  hydrOXYzine hydrochloride 50 milliGRAM(s) Oral at bedtime PRN Anxiety  melatonin 3 milliGRAM(s) Oral at bedtime PRN Insomnia  nicotine  Polacrilex Lozenge 4 milliGRAM(s) Oral every 2 hours PRN craving/withdrawal  ondansetron Injectable 4 milliGRAM(s) IV Push every 8 hours PRN Nausea and/or Vomiting      Allergies    amoxicillin (Fever)  penicillin (Rash)    Intolerances          O: ICU Vital Signs Last 24 Hrs  T(C): 36.3 (27 Mar 2023 13:10), Max: 36.8 (27 Mar 2023 05:59)  T(F): 97.4 (27 Mar 2023 13:10), Max: 98.3 (27 Mar 2023 05:59)  HR: 70 (27 Mar 2023 13:10) (62 - 91)  BP: 143/80 (27 Mar 2023 13:10) (141/88 - 147/77)  BP(mean): --  ABP: --  ABP(mean): --  RR: 18 (27 Mar 2023 13:10) (18 - 18)  SpO2: 95% (27 Mar 2023 13:10) (95% - 97%)    O2 Parameters below as of 27 Mar 2023 13:10  Patient On (Oxygen Delivery Method): room air          I&O's Detail    26 Mar 2023 07:01  -  27 Mar 2023 07:00  --------------------------------------------------------  IN:    Oral Fluid: 240 mL  Total IN: 240 mL    OUT:  Total OUT: 0 mL    Total NET: 240 mL      PHYSICAL EXAM:     The patient was alert and oriented X 3, well nourished, and in no  apparent distress.  OP showed no ulcerations  There was no visible lymphadenopathy.  Conjunctiva were non injected  There was no clubbing or edema of extremities.  The scalp, hair, face, eyebrows, lips, OP, neck, chest, back,   extremities X 4, nails were examined.  There was no hyperhidrosis or bromhidrosis.    Of note on skin exam:     circular ulcer on the lateral LLE with surrounding hyperpigmented and firm surrounding plaque  ulcer with erythematous border on the left buttocks        Labs:                       13.3   4.66  )-----------( 241      ( 27 Mar 2023 06:15 )             40.7     CBC Full  -  ( 27 Mar 2023 06:15 )  WBC Count : 4.66 K/uL  RBC Count : 5.09 M/uL  Hemoglobin : 13.3 g/dL  Hematocrit : 40.7 %  Platelet Count - Automated : 241 K/uL  Mean Cell Volume : 80.0 fL  Mean Cell Hemoglobin : 26.1 pg  Mean Cell Hemoglobin Concentration : 32.7 gm/dL  Auto Neutrophil # : x  Auto Lymphocyte # : x  Auto Monocyte # : x  Auto Eosinophil # : x  Auto Basophil # : x  Auto Neutrophil % : x  Auto Lymphocyte % : x  Auto Monocyte % : x  Auto Eosinophil % : x  Auto Basophil % : x    03-27    131<L>  |  93<L>  |  10  ----------------------------<  95  4.3   |  28  |  0.70    Ca    9.4      27 Mar 2023 06:15  Phos  4.3     03-27  Mg     1.90     03-27          CAPILLARY BLOOD GLUCOSE      POCT Blood Glucose.: 122 mg/dL (27 Mar 2023 16:37)

## 2023-03-27 NOTE — PROGRESS NOTE ADULT - ASSESSMENT
#Lesion on the left lower extremity - improving  DDx includes a chronic wound 2/2 manipulation v NMSC (SCC or BCC), or pyoderma gangrenosum (low suspicion  - F/u H&E and tissue culture  - C/w wound care    #Cellulitis of the left buttocks - improving on vanc currently  Cellulitis is a common bacterial infection of the deep dermis and subcutaneous tissue characterized by erythema, pain, warmth, and swelling. Pathogens of cellulitis are strongly correlated with age and immune status.  - S/p ID by surgery with packing  - Agree with antibiotic regimen as guided by ID    The patient's chart was reviewed in addition to being seen and examined at bedside with the dermatology attending.  Recommendations were communicated with the primary team.  Please page [phone] with a 10-digit call-back number for further related questions.    Gucci Garland MD  Resident Physician, PGY-3  [facility] Dermat[facility]  Pager: [phone]  Office: [phone]     #Lesion on the left lower extremity - improving  DDx includes a chronic wound 2/2 manipulation v NMSC (SCC or BCC), or pyoderma gangrenosum (low suspicion  - F/u H&E and tissue culture. TC currently showing MRSA and C.albicans  - C/w wound care    #Cellulitis of the left buttocks - improving on vanc currently  Cellulitis is a common bacterial infection of the deep dermis and subcutaneous tissue characterized by erythema, pain, warmth, and swelling. Pathogens of cellulitis are strongly correlated with age and immune status.  - S/p ID by surgery with packing  - Agree with antibiotic regimen as guided by ID    The patient's chart was reviewed in addition to being seen and examined at bedside with the dermatology attending.  Recommendations were communicated with the primary team.  Please page [phone] with a 10-digit call-back number for further related questions.    Gucci Garland MD  Resident Physician, PGY-3  [facility]ermat[facility]  Pager: [phone]  Office: [phone]

## 2023-03-27 NOTE — PROGRESS NOTE ADULT - ASSESSMENT
57 year old with schizophrenia, hypogammaglobulinemia on IVIG, presents with a change in behavior  Noted to have a left gluteal wound  Advised that at [facility] (OSH) patient was found to have MRSA Bacteremia 3/12  3/15 BCXs NGTD  Source MRSA bacteremia--buttock abscess/wound?  Wound culture with MRSA/candida (candida likely not significant)    1) Left Buttock wound  Imaging and history raise concern for an underlying fistula  Appreciate surgery input and I+D procedure; F/U surgery  Vanco as below  S/p course Cefepime/flagyl  Wound care eval    2) MRSA Bacteremia  - OSH positive BCX as above  - Vanco 1500mg q 8, monitor levels--through 4/11/23  - TTE generally reassuring, hold on LINDA for now  - F/U pending bCXs  - May be challenging disposition given underlying psych concerns/reliability; anticipate would need facility placement to complete regimen, likely needs PICC line for prolonged course vanco (but doubt patient would be able to manage PICC line/antibiotics at home by himself)    3) Transaminitis  Check acute hepatitis panel  Trend    4) CVID  Check immuoglobulins  On imunoglobulin treatment monthly    Josr Downs MD  Contact on TEAMS messaging from 9am - 5pm  From 5pm-9am, on weekends, or if no response call [phone]

## 2023-03-27 NOTE — PROGRESS NOTE ADULT - ATTENDING COMMENTS
Chronic lichenified plaque on leg now ulcerated. Suspect factitial component with possible superinfection (tissue cultures +MRSA). Biopsied to rule out non-melanoma skin cancer. Ulcer on left buttock appears improved today- less indurated, surrounding erythema decreased and less purulence.
Na better today    Cont to fluid restrict    Will sign off for now, please call with questions

## 2023-03-27 NOTE — PROGRESS NOTE ADULT - SUBJECTIVE AND OBJECTIVE BOX
Calm  Asking when he can go home    Vital Signs Last 24 Hrs  T(C): 36.8 (27 Mar 2023 05:59), Max: 36.8 (27 Mar 2023 05:59)  T(F): 98.3 (27 Mar 2023 05:59), Max: 98.3 (27 Mar 2023 05:59)  HR: 91 (27 Mar 2023 05:59) (62 - 91)  BP: 141/88 (27 Mar 2023 05:59) (131/70 - 147/77)  BP(mean): --  RR: 18 (27 Mar 2023 05:59) (17 - 18)  SpO2: 97% (27 Mar 2023 05:59) (96% - 97%)    I&O's Summary    03-26-23 @ 07:01  -  03-27-23 @ 07:00  --------------------------------------------------------  IN: 240 mL / OUT: 0 mL / NET: 240 mL          PHYSICAL EXAM:  GENERAL: NAD, well-developed, comfortable  HEAD:  NCAT, EOMI  NECK: Supple, No JVD  CHEST/LUNG: mild decrease breath sounds bilaterally; No wheeze   HEART: Regular rate and rhythm; No murmurs, rubs, or gallops  ABDOMEN: Soft, Nontender, Nondistended; Bowel sounds present  NEURO: AAOx3, understands why he is admitted, able to move all four extremities spontaneously  Skin: left buttocks: erythematous, indurated, with shallow ulceration (non bleeding/draining) about 4-5 cm, indurated, no fluctuance noted. LLE calf: non healing bleeding wound about 2cm diameter, no superimposed infection  Exts: (+)lt UE midline    LABS:                        13.3   4.66  )-----------( 241      ( 27 Mar 2023 06:15 )             40.7     03-27    131<L>  |  93<L>  |  10  ----------------------------<  95  4.3   |  28  |  0.70    Ca    9.4      27 Mar 2023 06:15  Phos  4.3     03-27  Mg     1.90     03-27        CAPILLARY BLOOD GLUCOSE      POCT Blood Glucose.: 115 mg/dL (27 Mar 2023 07:47)  POCT Blood Glucose.: 99 mg/dL (26 Mar 2023 21:39)  POCT Blood Glucose.: 159 mg/dL (26 Mar 2023 16:30)  POCT Blood Glucose.: 89 mg/dL (26 Mar 2023 11:28)            RADIOLOGY & ADDITIONAL TESTS:    Imaging Personally Reviewed:  [x] YES  [ ] NO    Case discussed with NPP:  [X] YES  [ ] NO

## 2023-03-27 NOTE — PROGRESS NOTE ADULT - SUBJECTIVE AND OBJECTIVE BOX
CC: F/U for Bacteremia    Saw/spoke to patient. No fevers, no chills. No new complaints.    Allergies  amoxicillin (Fever)  penicillin (Rash)    ANTIMICROBIALS:  vancomycin  IVPB 1500 every 8 hours    PE:    Vital Signs Last 24 Hrs  T(C): 36.3 (27 Mar 2023 13:10), Max: 36.8 (27 Mar 2023 05:59)  T(F): 97.4 (27 Mar 2023 13:10), Max: 98.3 (27 Mar 2023 05:59)  HR: 70 (27 Mar 2023 13:10) (62 - 91)  BP: 143/80 (27 Mar 2023 13:10) (141/88 - 147/77)  RR: 18 (27 Mar 2023 13:10) (18 - 18)  SpO2: 95% (27 Mar 2023 13:10) (95% - 97%)    Gen: AOx3, NAD, non-toxic  CV: Nontachycardic  Resp: Breathing comfortably, RA  Abd: Soft, nontender  IV/Skin: No thrombophlebitis    LABS:                        13.3   4.66  )-----------( 241      ( 27 Mar 2023 06:15 )             40.7     03-27    131<L>  |  93<L>  |  10  ----------------------------<  95  4.3   |  28  |  0.70    Ca    9.4      27 Mar 2023 06:15  Phos  4.3     03-27  Mg     1.90     03-27    MICROBIOLOGY:    .Tissue LEFT LOWER LEG PUNCH BIOPSY  03-21-23   Few Methicillin Resistant Staphylococcus aureus  Few Argelia albicans "Susceptibilities not performed"  --  Methicillin resistant Staphylococcus aureus    Clean Catch Clean Catch (Midstream)  03-15-23   No growth  --  --    .Blood Blood-Peripheral  03-15-23   No Growth Final  --  --    .Blood Blood-Peripheral  03-15-23   No Growth Final  --  --    RADIOLOGY:    3/15 CT:      IMPRESSION:  Left buttock cellulitis without evidence of abscess. Consider MRI to   evaluate for perianal fistula.

## 2023-03-28 LAB
ANION GAP SERPL CALC-SCNC: 12 MMOL/L — SIGNIFICANT CHANGE UP (ref 7–14)
BUN SERPL-MCNC: 10 MG/DL — SIGNIFICANT CHANGE UP (ref 7–23)
CALCIUM SERPL-MCNC: 9.2 MG/DL — SIGNIFICANT CHANGE UP (ref 8.4–10.5)
CHLORIDE SERPL-SCNC: 94 MMOL/L — LOW (ref 98–107)
CO2 SERPL-SCNC: 25 MMOL/L — SIGNIFICANT CHANGE UP (ref 22–31)
CREAT SERPL-MCNC: 0.68 MG/DL — SIGNIFICANT CHANGE UP (ref 0.5–1.3)
EGFR: 110 ML/MIN/1.73M2 — SIGNIFICANT CHANGE UP
GLUCOSE SERPL-MCNC: 92 MG/DL — SIGNIFICANT CHANGE UP (ref 70–99)
HCT VFR BLD CALC: 42.6 % — SIGNIFICANT CHANGE UP (ref 39–50)
HGB BLD-MCNC: 13.6 G/DL — SIGNIFICANT CHANGE UP (ref 13–17)
MAGNESIUM SERPL-MCNC: 2 MG/DL — SIGNIFICANT CHANGE UP (ref 1.6–2.6)
MCHC RBC-ENTMCNC: 25.6 PG — LOW (ref 27–34)
MCHC RBC-ENTMCNC: 31.9 GM/DL — LOW (ref 32–36)
MCV RBC AUTO: 80.1 FL — SIGNIFICANT CHANGE UP (ref 80–100)
NRBC # BLD: 0 /100 WBCS — SIGNIFICANT CHANGE UP (ref 0–0)
NRBC # FLD: 0 K/UL — SIGNIFICANT CHANGE UP (ref 0–0)
PHOSPHATE SERPL-MCNC: 4.7 MG/DL — HIGH (ref 2.5–4.5)
PLATELET # BLD AUTO: 238 K/UL — SIGNIFICANT CHANGE UP (ref 150–400)
POTASSIUM SERPL-MCNC: 4.4 MMOL/L — SIGNIFICANT CHANGE UP (ref 3.5–5.3)
POTASSIUM SERPL-SCNC: 4.4 MMOL/L — SIGNIFICANT CHANGE UP (ref 3.5–5.3)
RBC # BLD: 5.32 M/UL — SIGNIFICANT CHANGE UP (ref 4.2–5.8)
RBC # FLD: 15.8 % — HIGH (ref 10.3–14.5)
SARS-COV-2 RNA SPEC QL NAA+PROBE: SIGNIFICANT CHANGE UP
SODIUM SERPL-SCNC: 131 MMOL/L — LOW (ref 135–145)
VANCOMYCIN TROUGH SERPL-MCNC: 15.2 UG/ML — SIGNIFICANT CHANGE UP (ref 10–20)
WBC # BLD: 4.51 K/UL — SIGNIFICANT CHANGE UP (ref 3.8–10.5)
WBC # FLD AUTO: 4.51 K/UL — SIGNIFICANT CHANGE UP (ref 3.8–10.5)

## 2023-03-28 PROCEDURE — 99232 SBSQ HOSP IP/OBS MODERATE 35: CPT

## 2023-03-28 RX ADMIN — Medication 1 PATCH: at 11:21

## 2023-03-28 RX ADMIN — ALBUTEROL 2 PUFF(S): 90 AEROSOL, METERED ORAL at 16:21

## 2023-03-28 RX ADMIN — Medication 1 PATCH: at 06:32

## 2023-03-28 RX ADMIN — Medication 1 APPLICATION(S): at 05:45

## 2023-03-28 RX ADMIN — Medication 50 MILLIGRAM(S): at 11:43

## 2023-03-28 RX ADMIN — Medication 500 MILLIGRAM(S): at 11:16

## 2023-03-28 RX ADMIN — Medication 1 TABLET(S): at 11:16

## 2023-03-28 RX ADMIN — Medication 1 APPLICATION(S): at 18:22

## 2023-03-28 RX ADMIN — ALBUTEROL 2 PUFF(S): 90 AEROSOL, METERED ORAL at 23:37

## 2023-03-28 RX ADMIN — Medication 300 MILLIGRAM(S): at 15:27

## 2023-03-28 RX ADMIN — ALBUTEROL 2 PUFF(S): 90 AEROSOL, METERED ORAL at 09:57

## 2023-03-28 RX ADMIN — Medication 300 MILLIGRAM(S): at 09:53

## 2023-03-28 RX ADMIN — Medication 2000 UNIT(S): at 11:16

## 2023-03-28 RX ADMIN — Medication 300 MILLIGRAM(S): at 23:36

## 2023-03-28 RX ADMIN — TIOTROPIUM BROMIDE 2 PUFF(S): 18 CAPSULE ORAL; RESPIRATORY (INHALATION) at 09:58

## 2023-03-28 RX ADMIN — ENOXAPARIN SODIUM 40 MILLIGRAM(S): 100 INJECTION SUBCUTANEOUS at 17:53

## 2023-03-28 RX ADMIN — ALBUTEROL 2 PUFF(S): 90 AEROSOL, METERED ORAL at 05:41

## 2023-03-28 NOTE — PROGRESS NOTE ADULT - ASSESSMENT
57 year old with schizophrenia, hypogammaglobulinemia on IVIG, presents with a change in behavior  Noted to have a left gluteal wound  Advised that at [facility] (OSH) patient was found to have MRSA Bacteremia 3/12  3/15 BCXs NGTD  Source MRSA bacteremia--buttock abscess/wound?  Wound culture with MRSA/candida (candida likely not significant)    1) Left Buttock wound  Imaging and history raise concern for an underlying fistula  Appreciate surgery input and I+D procedure; F/U surgery  Vanco as below  S/p course Cefepime/flagyl  Wound care eval    2) MRSA Bacteremia  - OSH positive BCX as above  - Vanco 1500mg q 8, monitor levels--through 4/11/23  - TTE generally reassuring, hold on LINDA for now  - F/U pending bCXs  - May be challenging disposition given underlying psych concerns/reliability; anticipate would need facility placement to complete regimen, likely needs PICC line for prolonged course vanco (but doubt patient would be able to manage PICC line/antibiotics at home by himself)    3) Transaminitis  Check acute hepatitis panel  Trend    4) CVID  Check immuoglobulins  On imunoglobulin treatment monthly    Josr Downs MD  Contact on TEAMS messaging from 9am - 5pm  From 5pm-9am, on weekends, or if no response call [phone]

## 2023-03-28 NOTE — PROGRESS NOTE ADULT - SUBJECTIVE AND OBJECTIVE BOX
No fevers  No N/V  No rashes    Vital Signs Last 24 Hrs  T(C): 34.8 (28 Mar 2023 11:20), Max: 36.9 (27 Mar 2023 22:00)  T(F): 94.7 (28 Mar 2023 11:20), Max: 98.5 (27 Mar 2023 22:00)  HR: 73 (28 Mar 2023 11:20) (60 - 73)  BP: 143/90 (28 Mar 2023 11:20) (131/66 - 145/88)  BP(mean): --  RR: 16 (28 Mar 2023 11:20) (15 - 18)  SpO2: 93% (28 Mar 2023 05:40) (93% - 98%)    I&O's Summary    03-27-23 @ 07:01  -  03-28-23 @ 07:00  --------------------------------------------------------  IN: 240 mL / OUT: 0 mL / NET: 240 mL        PHYSICAL EXAM:  GENERAL: NAD, well-developed, comfortable  HEAD:  NCAT, EOMI  NECK: Supple, No JVD  CHEST/LUNG: mild decrease breath sounds bilaterally; No wheeze   HEART: Regular rate and rhythm; No murmurs, rubs, or gallops  ABDOMEN: Soft, Nontender, Nondistended; Bowel sounds present  NEURO: AAOx3, understands why he is admitted, able to move all four extremities spontaneously  Skin: left buttocks: erythematous, indurated, with shallow ulceration (non bleeding/draining) about 4-5 cm, indurated, no fluctuance noted. LLE calf: non healing bleeding wound about 2cm diameter, no superimposed infection  Exts: (+)lt UE midline    LABS:                        13.6   4.51  )-----------( 238      ( 28 Mar 2023 06:30 )             42.6     03-28    131<L>  |  94<L>  |  10  ----------------------------<  92  4.4   |  25  |  0.68    Ca    9.2      28 Mar 2023 06:30  Phos  4.7     03-28  Mg     2.00     03-28        CAPILLARY BLOOD GLUCOSE      POCT Blood Glucose.: 114 mg/dL (28 Mar 2023 11:10)  POCT Blood Glucose.: 131 mg/dL (28 Mar 2023 07:44)  POCT Blood Glucose.: 95 mg/dL (27 Mar 2023 21:09)  POCT Blood Glucose.: 122 mg/dL (27 Mar 2023 16:37)            RADIOLOGY & ADDITIONAL TESTS:    Imaging Personally Reviewed:  [x] YES  [ ] NO    Case discussed with NPP:  [X] YES  [ ] NO

## 2023-03-28 NOTE — PROGRESS NOTE ADULT - SUBJECTIVE AND OBJECTIVE BOX
CC: F/U for Bacteremia    Saw/spoke to patient. No fevers, no chills. No new complaints.    Allergies  amoxicillin (Fever)  penicillin (Rash)    ANTIMICROBIALS:  vancomycin  IVPB 1500 every 8 hours    PE:    Vital Signs Last 24 Hrs  T(C): 34.8 (28 Mar 2023 11:20), Max: 36.9 (27 Mar 2023 22:00)  T(F): 94.7 (28 Mar 2023 11:20), Max: 98.5 (27 Mar 2023 22:00)  HR: 73 (28 Mar 2023 11:20) (60 - 73)  BP: 143/90 (28 Mar 2023 11:20) (131/66 - 145/88)  RR: 16 (28 Mar 2023 11:20) (15 - 17)  SpO2: 93% (28 Mar 2023 05:40) (93% - 98%)    Gen: AOx3, NAD, non-toxic  CV: Nontachycardic  Resp: Breathing comfortably, RA  Abd: Soft, nontender  IV/Skin: No thrombophlebitis    LABS:                        13.6   4.51  )-----------( 238      ( 28 Mar 2023 06:30 )             42.6     03-28    131<L>  |  94<L>  |  10  ----------------------------<  92  4.4   |  25  |  0.68    Ca    9.2      28 Mar 2023 06:30  Phos  4.7     03-28  Mg     2.00     03-28    MICROBIOLOGY:  Vancomycin Level, Trough: 15.2 ug/mL (03-28-23 @ 06:30)    .Tissue LEFT LOWER LEG PUNCH BIOPSY  03-21-23   Few Methicillin Resistant Staphylococcus aureus  Few Argelia albicans "Susceptibilities not performed"  --  Methicillin resistant Staphylococcus aureus    Clean Catch Clean Catch (Midstream)  03-15-23   No growth  --  --    .Blood Blood-Peripheral  03-15-23   No Growth Final  --  --    .Blood Blood-Peripheral  03-15-23   No Growth Final  --  --    RADIOLOGY:    3/15 CT:    IMPRESSION:  Left buttock cellulitis without evidence of abscess. Consider MRI to   evaluate for perianal fistula.

## 2023-03-29 ENCOUNTER — TRANSCRIPTION ENCOUNTER (OUTPATIENT)
Age: 58
End: 2023-03-29

## 2023-03-29 VITALS
OXYGEN SATURATION: 100 % | HEART RATE: 73 BPM | DIASTOLIC BLOOD PRESSURE: 99 MMHG | SYSTOLIC BLOOD PRESSURE: 151 MMHG | RESPIRATION RATE: 17 BRPM | TEMPERATURE: 98 F

## 2023-03-29 LAB
ANION GAP SERPL CALC-SCNC: 11 MMOL/L — SIGNIFICANT CHANGE UP (ref 7–14)
BUN SERPL-MCNC: 10 MG/DL — SIGNIFICANT CHANGE UP (ref 7–23)
CALCIUM SERPL-MCNC: 9.3 MG/DL — SIGNIFICANT CHANGE UP (ref 8.4–10.5)
CHLORIDE SERPL-SCNC: 94 MMOL/L — LOW (ref 98–107)
CO2 SERPL-SCNC: 28 MMOL/L — SIGNIFICANT CHANGE UP (ref 22–31)
CREAT SERPL-MCNC: 0.68 MG/DL — SIGNIFICANT CHANGE UP (ref 0.5–1.3)
EGFR: 110 ML/MIN/1.73M2 — SIGNIFICANT CHANGE UP
GLUCOSE SERPL-MCNC: 103 MG/DL — HIGH (ref 70–99)
HCT VFR BLD CALC: 41.3 % — SIGNIFICANT CHANGE UP (ref 39–50)
HGB BLD-MCNC: 13.4 G/DL — SIGNIFICANT CHANGE UP (ref 13–17)
MAGNESIUM SERPL-MCNC: 1.8 MG/DL — SIGNIFICANT CHANGE UP (ref 1.6–2.6)
MCHC RBC-ENTMCNC: 25.8 PG — LOW (ref 27–34)
MCHC RBC-ENTMCNC: 32.4 GM/DL — SIGNIFICANT CHANGE UP (ref 32–36)
MCV RBC AUTO: 79.6 FL — LOW (ref 80–100)
NRBC # BLD: 0 /100 WBCS — SIGNIFICANT CHANGE UP (ref 0–0)
NRBC # FLD: 0 K/UL — SIGNIFICANT CHANGE UP (ref 0–0)
PHOSPHATE SERPL-MCNC: 3.7 MG/DL — SIGNIFICANT CHANGE UP (ref 2.5–4.5)
PLATELET # BLD AUTO: 252 K/UL — SIGNIFICANT CHANGE UP (ref 150–400)
POTASSIUM SERPL-MCNC: 4.3 MMOL/L — SIGNIFICANT CHANGE UP (ref 3.5–5.3)
POTASSIUM SERPL-SCNC: 4.3 MMOL/L — SIGNIFICANT CHANGE UP (ref 3.5–5.3)
RBC # BLD: 5.19 M/UL — SIGNIFICANT CHANGE UP (ref 4.2–5.8)
RBC # FLD: 15.7 % — HIGH (ref 10.3–14.5)
SODIUM SERPL-SCNC: 133 MMOL/L — LOW (ref 135–145)
VANCOMYCIN TROUGH SERPL-MCNC: 12.3 UG/ML — SIGNIFICANT CHANGE UP (ref 10–20)
WBC # BLD: 4.95 K/UL — SIGNIFICANT CHANGE UP (ref 3.8–10.5)
WBC # FLD AUTO: 4.95 K/UL — SIGNIFICANT CHANGE UP (ref 3.8–10.5)

## 2023-03-29 PROCEDURE — 99232 SBSQ HOSP IP/OBS MODERATE 35: CPT

## 2023-03-29 RX ORDER — LOSARTAN POTASSIUM 100 MG/1
1 TABLET, FILM COATED ORAL
Qty: 0 | Refills: 0 | DISCHARGE
Start: 2023-03-29

## 2023-03-29 RX ORDER — VANCOMYCIN HCL 1 G
1.5 VIAL (EA) INTRAVENOUS
Qty: 0 | Refills: 0 | DISCHARGE
Start: 2023-03-29 | End: 2023-04-11

## 2023-03-29 RX ORDER — IRBESARTAN 75 MG/1
0 TABLET ORAL
Qty: 0 | Refills: 0 | DISCHARGE

## 2023-03-29 RX ORDER — VANCOMYCIN HCL 1 G
1500 VIAL (EA) INTRAVENOUS EVERY 12 HOURS
Refills: 0 | Status: DISCONTINUED | OUTPATIENT
Start: 2023-03-30 | End: 2023-03-29

## 2023-03-29 RX ADMIN — Medication 300 MILLIGRAM(S): at 14:11

## 2023-03-29 RX ADMIN — TIOTROPIUM BROMIDE 2 PUFF(S): 18 CAPSULE ORAL; RESPIRATORY (INHALATION) at 10:57

## 2023-03-29 RX ADMIN — Medication 1 TABLET(S): at 11:22

## 2023-03-29 RX ADMIN — Medication 2000 UNIT(S): at 11:21

## 2023-03-29 RX ADMIN — ALBUTEROL 2 PUFF(S): 90 AEROSOL, METERED ORAL at 10:57

## 2023-03-29 RX ADMIN — Medication 1 PATCH: at 11:22

## 2023-03-29 RX ADMIN — Medication 300 MILLIGRAM(S): at 07:14

## 2023-03-29 RX ADMIN — Medication 500 MILLIGRAM(S): at 11:22

## 2023-03-29 RX ADMIN — Medication 1 APPLICATION(S): at 09:46

## 2023-03-29 RX ADMIN — LOSARTAN POTASSIUM 100 MILLIGRAM(S): 100 TABLET, FILM COATED ORAL at 07:14

## 2023-03-29 RX ADMIN — Medication 1 APPLICATION(S): at 17:26

## 2023-03-29 NOTE — PROGRESS NOTE ADULT - SUBJECTIVE AND OBJECTIVE BOX
CC: F/U for bacteremia    Saw/spoke to patient. Unchanged. No new complaints.    Allergies  amoxicillin (Fever)  penicillin (Rash)    ANTIMICROBIALS:      PE:    Vital Signs Last 24 Hrs  T(C): 36.6 (29 Mar 2023 12:03), Max: 36.6 (29 Mar 2023 12:03)  T(F): 97.9 (29 Mar 2023 12:03), Max: 97.9 (29 Mar 2023 12:03)  HR: 73 (29 Mar 2023 12:03) (73 - 87)  BP: 151/99 (29 Mar 2023 12:03) (143/80 - 151/99)  RR: 17 (29 Mar 2023 12:03) (17 - 19)  SpO2: 100% (29 Mar 2023 12:03) (95% - 100%)    Gen: AOx3, NAD, non-toxic  CV: Nontachycardic  Resp: Breathing comfortably, RA  Abd: Soft, nontender  IV/Skin: midline    LABS:                        13.4   4.95  )-----------( 252      ( 29 Mar 2023 11:07 )             41.3     03-29    133<L>  |  94<L>  |  10  ----------------------------<  103<H>  4.3   |  28  |  0.68    Ca    9.3      29 Mar 2023 11:07  Phos  3.7     03-29  Mg     1.80     03-29    MICROBIOLOGY:  Vancomycin Level, Trough: 12.3 ug/mL (03-29-23 @ 11:07)    .Tissue LEFT LOWER LEG PUNCH BIOPSY  03-21-23   Few Methicillin Resistant Staphylococcus aureus  Few Argelia albicans "Susceptibilities not performed"  --  Methicillin resistant Staphylococcus aureus    Clean Catch Clean Catch (Midstream)  03-15-23   No growth  --  --    .Blood Blood-Peripheral  03-15-23   No Growth Final  --  --    .Blood Blood-Peripheral  03-15-23   No Growth Final  --  --    RADIOLOGY:    3/15 CT:    IMPRESSION:  Left buttock cellulitis without evidence of abscess. Consider MRI to   evaluate for perianal fistula.

## 2023-03-29 NOTE — PROGRESS NOTE ADULT - PROBLEM SELECTOR PLAN 4
last infusion was 3/9/23  at risk for infections  2nd infection this year

## 2023-03-29 NOTE — PROGRESS NOTE ADULT - SUBJECTIVE AND OBJECTIVE BOX
SUBJECTIVE/ OVERNIGHT EVENTS:  Pt seen and examined at bedside.   No overnight event.  Feeling better.  no cp, no sob, no n/v/d.   denied pain.  excited to go to rehab this evening.       --------------------------------------------------------------------------------------------  LABS:                        13.4   4.95  )-----------( 252      ( 29 Mar 2023 11:07 )             41.3     03-29    133<L>  |  94<L>  |  10  ----------------------------<  103<H>  4.3   |  28  |  0.68    Ca    9.3      29 Mar 2023 11:07  Phos  3.7     03-29  Mg     1.80     03-29        CAPILLARY BLOOD GLUCOSE      POCT Blood Glucose.: 122 mg/dL (29 Mar 2023 11:56)  POCT Blood Glucose.: 135 mg/dL (29 Mar 2023 08:53)  POCT Blood Glucose.: 101 mg/dL (28 Mar 2023 21:43)  POCT Blood Glucose.: 142 mg/dL (28 Mar 2023 16:17)            RADIOLOGY & ADDITIONAL TESTS:    Imaging Personally Reviewed:  [x] YES  [ ] NO    Consultant(s) Notes Reviewed:  [x] YES  [ ] NO    MEDICATIONS  (STANDING):  albuterol    90 MICROgram(s) HFA Inhaler 2 Puff(s) Inhalation every 6 hours  ascorbic acid 500 milliGRAM(s) Oral daily  cholecalciferol 2000 Unit(s) Oral daily  Dakins Solution - 1/4 Strength 1 Application(s) Topical two times a day  diltiazem    milliGRAM(s) Oral daily  enoxaparin Injectable 40 milliGRAM(s) SubCutaneous every 24 hours  losartan 100 milliGRAM(s) Oral daily  multivitamin 1 Tablet(s) Oral daily  nicotine - 21 mG/24Hr(s) Patch 1 Patch Transdermal daily  tiotropium 2.5 MICROgram(s) Inhaler 2 Puff(s) Inhalation daily  vancomycin  IVPB 1500 milliGRAM(s) IV Intermittent every 8 hours    MEDICATIONS  (PRN):  acetaminophen     Tablet .. 650 milliGRAM(s) Oral every 6 hours PRN Temp greater or equal to 38C (100.4F), Mild Pain (1 - 3)  albuterol/ipratropium for Nebulization 3 milliLiter(s) Nebulizer every 6 hours PRN Shortness of Breath  aluminum hydroxide/magnesium hydroxide/simethicone Suspension 30 milliLiter(s) Oral every 4 hours PRN Dyspepsia  hydrOXYzine hydrochloride 50 milliGRAM(s) Oral at bedtime PRN Anxiety  melatonin 3 milliGRAM(s) Oral at bedtime PRN Insomnia  nicotine  Polacrilex Lozenge 4 milliGRAM(s) Oral every 2 hours PRN craving/withdrawal  ondansetron Injectable 4 milliGRAM(s) IV Push every 8 hours PRN Nausea and/or Vomiting      Care Discussed with Consultants/Other Providers [x] YES  [ ] NO    Vital Signs Last 24 Hrs  T(C): 36.6 (29 Mar 2023 12:03), Max: 36.6 (29 Mar 2023 12:03)  T(F): 97.9 (29 Mar 2023 12:03), Max: 97.9 (29 Mar 2023 12:03)  HR: 73 (29 Mar 2023 12:03) (73 - 87)  BP: 151/99 (29 Mar 2023 12:03) (143/80 - 151/99)  BP(mean): --  RR: 17 (29 Mar 2023 12:03) (17 - 19)  SpO2: 100% (29 Mar 2023 12:03) (95% - 100%)    Parameters below as of 29 Mar 2023 12:03  Patient On (Oxygen Delivery Method): room air      I&O's Summary    28 Mar 2023 07:01  -  29 Mar 2023 07:00  --------------------------------------------------------  IN: 518 mL / OUT: 0 mL / NET: 518 mL        PHYSICAL EXAM:  GENERAL: NAD, well-developed, comfortable on room air  HEAD:  NCAT, EOMI  NECK: Supple, No JVD  CHEST/LUNG: mild decrease breath sounds bilaterally; No wheeze   HEART: Regular rate and rhythm; No murmurs, rubs, or gallops  ABDOMEN: Soft, Nontender, Nondistended; Bowel sounds present  NEURO: AAOx3, understands why he is admitted, able to move all four extremities spontaneously  Skin: left buttocks: erythematous, indurated, with shallow ulceration (non bleeding/draining) about 4-5 cm, indurated, no fluctuance noted. LLE calf: non healing bleeding wound about 2cm diameter, no superimposed infection  Exts: (+)lt UE midline

## 2023-03-29 NOTE — PROGRESS NOTE ADULT - PROBLEM SELECTOR PLAN 2
c/w diltiazem and losartan home meds

## 2023-03-29 NOTE — BH CONSULTATION LIAISON PROGRESS NOTE - NSBHFUPINTERVALHXFT_PSY_A_CORE
Chart reviewed. Discussed in IDRs. No PRNs, calm behavior, accepting care per RN. Evaluated for decision-making capacity re: dispo planning. Pt denied any SI, HI, AVH, paranoia, or safety concerns. However, he thinks he can go home "anytime and take care of my wounds myself." He does not understand that he requires IV ABX still, and thinks his wounds will simply "heal on their own." Insight poor.

## 2023-03-29 NOTE — DISCHARGE NOTE NURSING/CASE MANAGEMENT/SOCIAL WORK - MODE OF TRANSPORTATION
Ambulance Dorsal Nasal Flap Text: The defect edges were debeveled with a #15 scalpel blade.  Given the location of the defect and the proximity to free margins a dorsal nasal flap was deemed most appropriate.  Using a sterile surgical marker, an appropriate dorsal nasal flap was drawn around the defect.    The area thus outlined was incised deep to adipose tissue with a #15 scalpel blade.  The skin margins were undermined to an appropriate distance in all directions utilizing iris scissors.

## 2023-03-29 NOTE — DISCHARGE NOTE NURSING/CASE MANAGEMENT/SOCIAL WORK - NSDCPEFALRISK_GEN_ALL_CORE
For information on Fall & Injury Prevention, visit: [email or web address] OR  [email or web address] OR  https://www.cdc.gov/steadi/patient.html

## 2023-03-29 NOTE — PROGRESS NOTE ADULT - PROBLEM SELECTOR PROBLEM 5
Schizoaffective disorder

## 2023-03-29 NOTE — BH CONSULTATION LIAISON PROGRESS NOTE - CURRENT MEDICATION
MEDICATIONS  (STANDING):  albuterol    90 MICROgram(s) HFA Inhaler 2 Puff(s) Inhalation every 6 hours  cefepime   IVPB 2000 milliGRAM(s) IV Intermittent every 12 hours  cholecalciferol 2000 Unit(s) Oral daily  clobetasol 0.05% Ointment 1 Application(s) Topical two times a day  diltiazem    milliGRAM(s) Oral daily  enoxaparin Injectable 40 milliGRAM(s) SubCutaneous every 24 hours  lidocaine 1% Injectable 20 milliLiter(s) Local Injection once  losartan 100 milliGRAM(s) Oral daily  metroNIDAZOLE    Tablet 500 milliGRAM(s) Oral two times a day  nicotine - 21 mG/24Hr(s) Patch 1 Patch Transdermal daily  tiotropium 2.5 MICROgram(s) Inhaler 2 Puff(s) Inhalation daily  vancomycin  IVPB 1250 milliGRAM(s) IV Intermittent every 8 hours    MEDICATIONS  (PRN):  acetaminophen     Tablet .. 650 milliGRAM(s) Oral every 6 hours PRN Temp greater or equal to 38C (100.4F), Mild Pain (1 - 3)  albuterol/ipratropium for Nebulization 3 milliLiter(s) Nebulizer every 6 hours PRN Shortness of Breath  aluminum hydroxide/magnesium hydroxide/simethicone Suspension 30 milliLiter(s) Oral every 4 hours PRN Dyspepsia  hydrOXYzine hydrochloride 50 milliGRAM(s) Oral at bedtime PRN Anxiety  melatonin 3 milliGRAM(s) Oral at bedtime PRN Insomnia  nicotine  Polacrilex Lozenge 4 milliGRAM(s) Oral every 2 hours PRN craving/withdrawal  ondansetron Injectable 4 milliGRAM(s) IV Push every 8 hours PRN Nausea and/or Vomiting  
MEDICATIONS  (STANDING):  albuterol    90 MICROgram(s) HFA Inhaler 2 Puff(s) Inhalation every 6 hours  ascorbic acid 500 milliGRAM(s) Oral daily  cefepime   IVPB 2000 milliGRAM(s) IV Intermittent every 12 hours  cholecalciferol 2000 Unit(s) Oral daily  clobetasol 0.05% Ointment 1 Application(s) Topical two times a day  Dakins Solution - 1/4 Strength 1 Application(s) Topical two times a day  diltiazem    milliGRAM(s) Oral daily  enoxaparin Injectable 40 milliGRAM(s) SubCutaneous every 24 hours  losartan 100 milliGRAM(s) Oral daily  metroNIDAZOLE    Tablet 500 milliGRAM(s) Oral two times a day  multivitamin 1 Tablet(s) Oral daily  nicotine - 21 mG/24Hr(s) Patch 1 Patch Transdermal daily  tiotropium 2.5 MICROgram(s) Inhaler 2 Puff(s) Inhalation daily  vancomycin  IVPB 1500 milliGRAM(s) IV Intermittent every 8 hours    MEDICATIONS  (PRN):  acetaminophen     Tablet .. 650 milliGRAM(s) Oral every 6 hours PRN Temp greater or equal to 38C (100.4F), Mild Pain (1 - 3)  albuterol/ipratropium for Nebulization 3 milliLiter(s) Nebulizer every 6 hours PRN Shortness of Breath  aluminum hydroxide/magnesium hydroxide/simethicone Suspension 30 milliLiter(s) Oral every 4 hours PRN Dyspepsia  hydrOXYzine hydrochloride 50 milliGRAM(s) Oral at bedtime PRN Anxiety  melatonin 3 milliGRAM(s) Oral at bedtime PRN Insomnia  nicotine  Polacrilex Lozenge 4 milliGRAM(s) Oral every 2 hours PRN craving/withdrawal  ondansetron Injectable 4 milliGRAM(s) IV Push every 8 hours PRN Nausea and/or Vomiting  
MEDICATIONS  (STANDING):  albuterol    90 MICROgram(s) HFA Inhaler 2 Puff(s) Inhalation every 6 hours  ascorbic acid 500 milliGRAM(s) Oral daily  cholecalciferol 2000 Unit(s) Oral daily  Dakins Solution - 1/4 Strength 1 Application(s) Topical two times a day  diltiazem    milliGRAM(s) Oral daily  enoxaparin Injectable 40 milliGRAM(s) SubCutaneous every 24 hours  losartan 100 milliGRAM(s) Oral daily  multivitamin 1 Tablet(s) Oral daily  nicotine - 21 mG/24Hr(s) Patch 1 Patch Transdermal daily  tiotropium 2.5 MICROgram(s) Inhaler 2 Puff(s) Inhalation daily  vancomycin  IVPB 1500 milliGRAM(s) IV Intermittent every 8 hours    MEDICATIONS  (PRN):  acetaminophen     Tablet .. 650 milliGRAM(s) Oral every 6 hours PRN Temp greater or equal to 38C (100.4F), Mild Pain (1 - 3)  albuterol/ipratropium for Nebulization 3 milliLiter(s) Nebulizer every 6 hours PRN Shortness of Breath  aluminum hydroxide/magnesium hydroxide/simethicone Suspension 30 milliLiter(s) Oral every 4 hours PRN Dyspepsia  hydrOXYzine hydrochloride 50 milliGRAM(s) Oral at bedtime PRN Anxiety  melatonin 3 milliGRAM(s) Oral at bedtime PRN Insomnia  nicotine  Polacrilex Lozenge 4 milliGRAM(s) Oral every 2 hours PRN craving/withdrawal  ondansetron Injectable 4 milliGRAM(s) IV Push every 8 hours PRN Nausea and/or Vomiting  
MEDICATIONS  (STANDING):  albuterol    90 MICROgram(s) HFA Inhaler 2 Puff(s) Inhalation every 6 hours  ascorbic acid 500 milliGRAM(s) Oral daily  cefepime   IVPB 2000 milliGRAM(s) IV Intermittent every 12 hours  cholecalciferol 2000 Unit(s) Oral daily  clobetasol 0.05% Ointment 1 Application(s) Topical two times a day  Dakins Solution - 1/4 Strength 1 Application(s) Topical two times a day  diltiazem    milliGRAM(s) Oral daily  enoxaparin Injectable 40 milliGRAM(s) SubCutaneous every 24 hours  losartan 100 milliGRAM(s) Oral daily  metroNIDAZOLE    Tablet 500 milliGRAM(s) Oral two times a day  multivitamin 1 Tablet(s) Oral daily  nicotine - 21 mG/24Hr(s) Patch 1 Patch Transdermal daily  tiotropium 2.5 MICROgram(s) Inhaler 2 Puff(s) Inhalation daily  vancomycin  IVPB 1500 milliGRAM(s) IV Intermittent every 8 hours    MEDICATIONS  (PRN):  acetaminophen     Tablet .. 650 milliGRAM(s) Oral every 6 hours PRN Temp greater or equal to 38C (100.4F), Mild Pain (1 - 3)  albuterol/ipratropium for Nebulization 3 milliLiter(s) Nebulizer every 6 hours PRN Shortness of Breath  aluminum hydroxide/magnesium hydroxide/simethicone Suspension 30 milliLiter(s) Oral every 4 hours PRN Dyspepsia  hydrOXYzine hydrochloride 50 milliGRAM(s) Oral at bedtime PRN Anxiety  melatonin 3 milliGRAM(s) Oral at bedtime PRN Insomnia  nicotine  Polacrilex Lozenge 4 milliGRAM(s) Oral every 2 hours PRN craving/withdrawal  ondansetron Injectable 4 milliGRAM(s) IV Push every 8 hours PRN Nausea and/or Vomiting

## 2023-03-29 NOTE — PROGRESS NOTE ADULT - REASON FOR ADMISSION
cellulitis, delusions

## 2023-03-29 NOTE — PROGRESS NOTE ADULT - ASSESSMENT
57 year old with schizophrenia, hypogammaglobulinemia on IVIG, presents with a change in behavior  Noted to have a left gluteal wound  Advised that at [facility] (OSH) patient was found to have MRSA Bacteremia 3/12  3/15 BCXs NGTD  Source MRSA bacteremia--buttock abscess/wound?  Wound culture with MRSA/candida (candida likely not significant)    1) Left Buttock wound  Imaging and history raise concern for an underlying fistula  Appreciate surgery input and I+D procedure; F/U surgery  Vanco as below  S/p course Cefepime/flagyl  Wound care eval    2) MRSA Bacteremia  - OSH positive BCX as above  - Vanco 1500mg q 12, monitor levels--through 4/11/23--decreased dose  - TTE generally reassuring, hold on LINDA for now  - F/U pending bCXs  - May be challenging disposition given underlying psych concerns/reliability; anticipate would need facility placement to complete regimen, likely needs PICC line for prolonged course vanco (but doubt patient would be able to manage PICC line/antibiotics at home by himself)    3) Transaminitis  Check acute hepatitis panel  Trend    4) CVID  Check immuoglobulins  On imunoglobulin treatment monthly    Josr Downs MD  Contact on TEAMS messaging from 9am - 5pm  From 5pm-9am, on weekends, or if no response call [phone]

## 2023-03-29 NOTE — PROGRESS NOTE ADULT - PROVIDER SPECIALTY LIST ADULT
Infectious Disease
Wound Care
Infectious Disease
Surgery
Dermatology
Infectious Disease
Surgery
Surgery
Internal Medicine
Nephrology
Internal Medicine

## 2023-03-29 NOTE — PROGRESS NOTE ADULT - PROBLEM SELECTOR PROBLEM 1
Cellulitis
Cellulitis
Hyponatremia
Cellulitis

## 2023-03-29 NOTE — PROGRESS NOTE ADULT - PROBLEM SELECTOR PROBLEM 4
CVID (common variable immunodeficiency)

## 2023-03-29 NOTE — DISCHARGE NOTE NURSING/CASE MANAGEMENT/SOCIAL WORK - PATIENT PORTAL LINK FT
You can access the FollowMyHealth Patient Portal offered by [facility] by registering at the following website: [email or web address]. By joining [employer]’s FollowMyHealth portal, you will also be able to view your health information using other applications (apps) compatible with our system.

## 2023-03-29 NOTE — PROGRESS NOTE ADULT - PROBLEM SELECTOR PROBLEM 7
Elevated LFTs

## 2023-03-29 NOTE — PROGRESS NOTE ADULT - PROBLEM SELECTOR PLAN 7
unclear etiology  denies ETOH consumption  Ct abd shows no pathology  hep panel negative.
unclear etiology  denies ETOH consumption  CT abd showed no pathology  hep panel negative.
unclear etiology  denies ETOH consumption  Ct abd shows no pathology  hep panel negative.
unclear etiology  denies ETOH consumption  Ct abd shows no pathology  check hep panel  trend in AM
unclear etiology  denies ETOH consumption  Ct abd shows no pathology  check hep panel  trend in AM
unclear etiology  denies ETOH consumption  Ct abd shows no pathology  hep panel negative.
unclear etiology  denies ETOH consumption  CT abd showed no pathology  hep panel negative.
unclear etiology  denies ETOH consumption  Ct abd shows no pathology  hep panel negative.
unclear etiology  denies ETOH consumption  Ct abd shows no pathology  hep panel negative.

## 2023-03-29 NOTE — PROGRESS NOTE ADULT - NSPROGADDITIONALINFOA_GEN_ALL_CORE
d/w pt and PA who discussed with the sister HCP Brittany.  d/w ID Dr. Downs.     dc planning. abx last day 4/11.  outpt ID follow up.     - Dr. JAKE Morales ([facility])  - [phone]

## 2023-03-29 NOTE — PROGRESS NOTE ADULT - PROBLEM SELECTOR PLAN 6
nicotine patch started  lozenges started (can have up to 20 in 24 hrs)

## 2023-03-29 NOTE — BH CONSULTATION LIAISON PROGRESS NOTE - NSBHCONSFOLLOWNEEDS_PSY_ALL_CORE
No psychiatric contraindications to discharge
Needs further psych safety assessment prior to discharge
No psychiatric contraindications to discharge
Needs further psych safety assessment prior to discharge

## 2023-03-29 NOTE — PROGRESS NOTE ADULT - PROBLEM SELECTOR PLAN 1
Patient with hyponatremia in setting of polydipsia. Na initially 129 then 124 and now 130 today with fluid restriction. Continue to fluid restrict to < 1L. Would defer initiation of salt tabs as he is improving solely with fluid restriction. Continue to monitor SNa.
will cover with broad spectrum abx: Cefepime/vanc/Flagyl  check MRSA nares to de-escalate vanc  previously has responded to first generation cephalosporin (and tolerated, in spite of PCN allergy)  CT Abd/pelv-shows no abscess and no fistula (will consider MRI for further eval but no risk factors for fistula formation).   surgery consulted: s/p I&D and dressing changes  CK elevated, trend daily. improved.   wound care consulted.   resume steroid cream for the LLE calf wound (last rec from derm in dec) x 1 week to see if there is improvement  MRSA+ on 3/12 cultures from [facility]. \[facility]""? already on vanco IV  surgery and ID following  derm consult per wound care recommendation.
Cefepime/flagyl last day was 3/22/23  CT Abd/pelv-showed no abscess and no fistula (will consider MRI for further eval but no risk factors for fistula formation).   surgery consulted: s/p I&D and dressing changes  CK elevated, trend daily. improved.   wound care consulted.   resume steroid cream for the LLE calf wound (last rec from derm in dec) x 1 week to see if there is improvement  MRSA+ on 3/12 cultures from [facility]? already on vanco IV through 4/11/23  s/p punch biopsy LLE 3/21/23 --> few MRSA and few Candida albicans species  appreciate surgery and ID   appreciate derm/wound care
Cefepime/flagyl last day was 3/22/23  CT Abd/pelv-showed no abscess and no fistula (will consider MRI for further eval but no risk factors for fistula formation).   surgery consulted: s/p I&D and dressing changes  CK elevated, trend daily. improved.   wound care consulted.   resume steroid cream for the LLE calf wound (last rec from derm in dec) x 1 week to see if there is improvement  MRSA+ on 3/12 cultures from [facility]? already on vanco IV through 4/11/23  s/p punch biopsy LLE 3/21/23 --> preliminary culture with few MRSA and Candida albicans species  surgery and ID following  appreciate derm/wound care
will cover with broad spectrum abx: Cefepime/vanc/Flagyl  check MRSA nares to de-escalate vanc  previously has responded to first generation cephalosporin (and tolerated, in spite of PCN allergy)  CT Abd/pelv-shows no abscess and no fistula (will consider MRI for further eval but no risk factors for fistula formation).   surgery consulted: plan for I&D  CK elevated, trend daily.   wound care consulted.   resume steroid cream for the LLE calf wound (last rec from derm in dec) x 1 week to see if there is improvement
will cover with broad spectrum abx: Cefepime/vanc/Flagyl  check MRSA nares to de-escalate vanc  previously has responded to first generation cephalosporin (and tolerated, in spite of PCN allergy)  CT Abd/pelv-shows no abscess and no fistula (will consider MRI for further eval but no risk factors for fistula formation).   surgery consulted: s/p I&D and dressing changes  CK elevated, trend daily. improved.   wound care consulted.   resume steroid cream for the LLE calf wound (last rec from derm in dec) x 1 week to see if there is improvement  MRSA+ on 3/12 cultures from [facility]? already on vanco IV  surgery and ID following
will cover with broad spectrum abx: Cefepime/vanc/Flagyl  check MRSA nares to de-escalate vanc  previously has responded to first generation cephalosporin (and tolerated, in spite of PCN allergy)  CT Abd/pelv-shows no abscess and no fistula (will consider MRI for further eval but no risk factors for fistula formation).   surgery consulted: s/p I&D and dressing changes  CK elevated, trend daily. improved.   wound care consulted.   resume steroid cream for the LLE calf wound (last rec from derm in dec) x 1 week to see if there is improvement  MRSA+ on 3/12 cultures [facility] already on vanco IV  surgery and ID following
Cefepime/flagyl last day was 3/22/23  CT Abd/pelv-showed no abscess and no fistula (will consider MRI for further eval but no risk factors for fistula formation).   surgery consulted: s/p I&D and dressing changes  CK elevated, trend daily. improved.   wound care consulted.   resume steroid cream for the LLE calf wound (last rec from derm in dec) x 1 week to see if there is improvement  MRSA+ on 3/12 cultures from [facility]? already on vanco IV through 4/11/23  s/p punch biopsy LLE 3/21/23  surgery and ID following  appreciate derm/wound care
Cefepime/flagyl last day was 3/22/23  CT Abd/pelv-showed no abscess and no fistula (will consider MRI for further eval but no risk factors for fistula formation).   surgery consulted: s/p I&D and dressing changes  CK elevated, trend daily. improved.   wound care consulted.   resume steroid cream for the LLE calf wound (last rec from derm in dec) x 1 week to see if there is improvement  MRSA+ on 3/12 cultures from [facility]? already on vanco IV through 4/11/23  s/p punch biopsy LLE 3/21/23 --> preliminary culture with few MRSA and Candida albicans species  surgery and ID following  appreciate derm/wound care
Cefepime/flagyl last day was 3/22/23  CT Abd/pelv-showed no abscess and no fistula (will consider MRI for further eval but no risk factors for fistula formation).   surgery consulted: s/p I&D and dressing changes  CK elevated, trend daily. improved.   wound care consulted.   resume steroid cream for the LLE calf wound (last rec from derm in dec) x 1 week to see if there is improvement  MRSA+ on 3/12 cultures from [facility]? already on vanco IV through 4/11/23  s/p punch biopsy LLE 3/21/23 --> few MRSA and few Candida albicans species  appreciate surgery and ID   appreciate derm/wound care
will cover with broad spectrum abx: Cefepime/vanc/Flagyl  check MRSA nares to de-escalate vanc  previously has responded to first generation cephalosporin (and tolerated, in spite of PCN allergy)  CT Abd/pelv-shows no abscess and no fistula (will consider MRI for further eval but no risk factors for fistula formation).   surgery consulted: s/p I&D and dressing changes  CK elevated, trend daily. improved.   wound care consulted.   resume steroid cream for the LLE calf wound (last rec from derm in dec) x 1 week to see if there is improvement  MRSA+ on 3/12 cultures from [facility]. \[facility].\""? already on vanco IV  surgery and ID following
will cover with broad spectrum abx: Cefepime/vanc/Flagyl  check MRSA nares to de-escalate vanc  previously has responded to first generation cephalosporin (and tolerated, in spite of PCN allergy)  CT Abd/pelv-shows no abscess and no fistula (will consider MRI for further eval but no risk factors for fistula formation).   surgery consulted: plan for I&D  CK elevated, trend daily.   wound care consulted.   resume steroid cream for the LLE calf wound (last rec from derm in dec) x 1 week to see if there is improvement
will cover with broad spectrum abx: Cefepime/vanc/Flagyl  check MRSA nares to de-escalate vanc  previously has responded to first generation cephalosporin (and tolerated, in spite of PCN allergy)  CT Abd/pelv-shows no abscess and no fistula (will consider MRI for further eval but no risk factors for fistula formation).   surgery consulted: s/p I&D and dressing changes  CK elevated, trend daily. improved.   wound care consulted.   resume steroid cream for the LLE calf wound (last rec from derm in dec) x 1 week to see if there is improvement  MRSA+ on 3/12 cultures from [facility]? already on vanco IV  s/p punch biopsy LLE 3/21/23  surgery and ID following  appreciate derm/wound care
Cefepime/flagyl last day was 3/22/23  CT Abd/pelv-showed no abscess and no fistula (will consider MRI for further eval but no risk factors for fistula formation).   surgery consulted: s/p I&D and dressing changes  CK elevated, trend daily. improved.   wound care consulted.   resume steroid cream for the LLE calf wound (last rec from derm in dec) x 1 week to see if there is improvement  MRSA+ on 3/12 cultures from [facility]? already on vanco IV through 4/11/23  s/p punch biopsy LLE 3/21/23 --> preliminary culture with few MRSA and Candida albicans species  surgery and ID following  appreciate derm/wound care
Cefepime/flagyl last day was 3/22/23  CT Abd/pelv-showed no abscess and no fistula (will consider MRI for further eval but no risk factors for fistula formation).   surgery consulted: s/p I&D and dressing changes  CK elevated, trend daily. improved.   wound care consulted.   resume steroid cream for the LLE calf wound (last rec from derm in dec) x 1 week to see if there is improvement  MRSA+ on 3/12 cultures from [facility]? already on vanco IV through 4/11/23  s/p punch biopsy LLE 3/21/23 --> preliminary culture with few MRSA and Candida albicans species  surgery and ID following  appreciate derm/wound care

## 2023-03-29 NOTE — BH CONSULTATION LIAISON PROGRESS NOTE - NSBHCHARTREVIEWVS_PSY_A_CORE FT
Vital Signs Last 24 Hrs  T(C): 37 (22 Mar 2023 05:02), Max: 37 (22 Mar 2023 05:02)  T(F): 98.6 (22 Mar 2023 05:02), Max: 98.6 (22 Mar 2023 05:02)  HR: 86 (22 Mar 2023 05:02) (74 - 86)  BP: 164/82 (22 Mar 2023 05:02) (161/82 - 164/82)  BP(mean): --  RR: 18 (22 Mar 2023 05:02) (18 - 18)  SpO2: 95% (22 Mar 2023 05:02) (95% - 97%)    Parameters below as of 22 Mar 2023 05:02  Patient On (Oxygen Delivery Method): room air    
Vital Signs Last 24 Hrs  T(C): 36.9 (17 Mar 2023 10:31), Max: 36.9 (17 Mar 2023 10:31)  T(F): 98.4 (17 Mar 2023 10:31), Max: 98.4 (17 Mar 2023 10:31)  HR: 75 (17 Mar 2023 10:31) (75 - 94)  BP: 168/83 (17 Mar 2023 10:31) (152/89 - 168/83)  BP(mean): --  RR: 18 (17 Mar 2023 10:31) (17 - 18)  SpO2: 94% (17 Mar 2023 10:31) (92% - 94%)    Parameters below as of 17 Mar 2023 10:31  Patient On (Oxygen Delivery Method): room air    
Vital Signs Last 24 Hrs  T(C): 36.5 (29 Mar 2023 04:15), Max: 36.5 (29 Mar 2023 04:15)  T(F): 97.7 (29 Mar 2023 04:15), Max: 97.7 (29 Mar 2023 04:15)  HR: 78 (29 Mar 2023 04:15) (73 - 87)  BP: 148/83 (29 Mar 2023 04:15) (143/80 - 148/83)  BP(mean): --  RR: 19 (29 Mar 2023 04:15) (16 - 19)  SpO2: 98% (29 Mar 2023 04:15) (95% - 98%)    Parameters below as of 29 Mar 2023 04:15  Patient On (Oxygen Delivery Method): room air    
Vital Signs Last 24 Hrs  T(C): 36.7 (21 Mar 2023 04:45), Max: 36.8 (20 Mar 2023 20:27)  T(F): 98.1 (21 Mar 2023 04:45), Max: 98.3 (20 Mar 2023 20:27)  HR: 79 (21 Mar 2023 04:45) (79 - 91)  BP: 164/86 (21 Mar 2023 04:45) (141/85 - 164/97)  BP(mean): --  RR: 17 (21 Mar 2023 04:45) (17 - 18)  SpO2: 94% (21 Mar 2023 04:45) (94% - 96%)    Parameters below as of 21 Mar 2023 04:45  Patient On (Oxygen Delivery Method): room air

## 2023-03-29 NOTE — BH CONSULTATION LIAISON PROGRESS NOTE - NSBHATTESTBILLING_PSY_A_CORE
35187-Dkhabzuhak OBS or IP - moderate complexity OR 35-49 mins
23917-Bgckxyqovf OBS or IP - moderate complexity OR 35-49 mins
08884-Nufvqpnoqz OBS or IP - low complexity OR 25-34 mins
95338-Iuzzwonptn OBS or IP - low complexity OR 25-34 mins

## 2023-03-29 NOTE — BH CONSULTATION LIAISON PROGRESS NOTE - NSBHCONSULTFOLLOW_PSY_ALL_CORE
No, psychiatric follow up needed - call with questions...
Yes...
Yes...
No, psychiatric follow up needed - call with questions...

## 2023-03-29 NOTE — PROGRESS NOTE ADULT - PROBLEM SELECTOR PROBLEM 6
Current smoker

## 2023-03-29 NOTE — PROGRESS NOTE ADULT - PROBLEM SELECTOR PLAN 8
Lovenox proph

## 2023-03-29 NOTE — PROGRESS NOTE ADULT - PROBLEM SELECTOR PLAN 5
possibly also bipolar  currently alert, oriented, understands why he is here  reports of bizarre behavior but none seen on exam today (no evidence of psychosis) but per staff has intermittent confusion. if confused, cannot leave AMA  psych consulted to assist with med management and optimization
possibly also bipolar  currently alert, oriented, understands why he is here  reports of bizarre behavior but none seen on exam today (no evidence of psychosis) but per staff has intermittent confusion. if confused, cannot leave AMA  psych consulted to assist with med management and optimization  he remains calm and cooperative here excepts times when he wants to go home and multiple attempt to leave AMA
possibly also bipolar  currently alert, oriented, understands why he is here  reports of bizarre behavior but none seen on exam today (no evidence of psychosis) but per staff has intermittent confusion. if confused, cannot leave AMA  psych consulted to assist with med management and optimization  he remains calm and cooperative here excepts times when he wants to go home and multiple attempt to leave AMA
possibly also bipolar  currently alert, oriented, understands why he is here  reports of bizarre behavior but none seen on exam today (no evidence of psychosis) but per staff has intermittent confusion. if confused, cannot leave AMA  does not require inpt psych  he remains calm and cooperative here excepts times when he wants to go home and multiple attempt to leave AMA  behavioral health appreciated
possibly also bipolar  currently alert, oriented, understands why he is here  reports of bizarre behavior but none seen on exam today (no evidence of psychosis) but per staff has intermittent confusion. if confused, cannot leave AMA  does not require inpt psych  he remains calm and cooperative here excepts times when he wants to go home and multiple attempt to leave AMA  behavioral health appreciated
possibly also bipolar  currently alert, oriented, understands why he is here  reports of bizarre behavior but none seen on exam today (no evidence of psychosis) but per staff has intermittent confusion. if confused, cannot leave AMA  psych consulted to assist with med management and optimization  he remains calm and coorperative here
possibly also bipolar  currently alert, oriented, understands why he is here  reports of bizarre behavior but none seen on exam today (no evidence of psychosis) but per staff has intermittent confusion. if confused, cannot leave AMA  does not require inpt psych  he remains calm and cooperative here excepts times when he wants to go home and multiple attempt to leave AMA  behavioral health appreciated
possibly also bipolar  currently alert, oriented, understands why he is here  reports of bizarre behavior but none seen on exam today (no evidence of psychosis) but per staff has intermittent confusion. if confused, cannot leave AMA  does not require inpt psych  he remains calm and cooperative here excepts times when he wants to go home and multiple attempt to leave AMA  behavioral health appreciated
possibly also bipolar  currently alert, oriented, understands why he is here  reports of bizarre behavior but none seen on exam today (no evidence of psychosis) but per staff has intermittent confusion. if confused, cannot leave AMA  psych consulted to assist with med management and optimization
possibly also bipolar  currently alert, oriented, understands why he is here  reports of bizarre behavior but none seen on exam today (no evidence of psychosis) but per staff has intermittent confusion. if confused, cannot leave AMA  does not require inpt psych  he remains calm and cooperative here excepts times when he wants to go home and multiple attempt to leave AMA  behavioral health appreciated
possibly also bipolar  currently alert, oriented, understands why he is here  reports of bizarre behavior but none seen on exam today (no evidence of psychosis) but per staff has intermittent confusion. if confused, cannot leave AMA  psych consulted to assist with med management and optimization  he remains calm and cooperative here excepts times when he wants to go home
possibly also bipolar  currently alert, oriented, understands why he is here  reports of bizarre behavior but none seen on exam today (no evidence of psychosis) but per staff has intermittent confusion. if confused, cannot leave AMA  psych consulted to assist with med management and optimization
possibly also bipolar  currently alert, oriented, understands why he is here  reports of bizarre behavior but none seen on exam today (no evidence of psychosis) but per staff has intermittent confusion. if confused, cannot leave AMA  does not require inpt psych  he remains calm and cooperative here excepts times when he wants to go home and multiple attempt to leave AMA  behavioral health appreciated
possibly also bipolar  currently alert, oriented, understands why he is here  reports of bizarre behavior but none seen on exam today (no evidence of psychosis) but per staff has intermittent confusion. if confused, cannot leave AMA  does not require inpt psych  he remains calm and cooperative here excepts times when he wants to go home and multiple attempt to leave AMA  behavioral health appreciated

## 2023-03-29 NOTE — PROGRESS NOTE ADULT - PROBLEM SELECTOR PLAN 3
previously has been attributed to Paroxetine vs. SIADH.  likely is multifactorial  previous urine studies have mostly been consistent with SIADH (uric acid is also low)  continue to trend, started free water restriction, improved Na.   c/w paroxetine, less likely etiology
unclear source, previously has been attributed to Paroxetine  likely is multifactorial  previous urine studies have mostly been consistent with SIADH (uric acid is also low)  continue to trend, started free water restriction, improved Na.   c/w paroxetine, less likely etiology
unclear source, previously has been attributed to Paroxetine  likely is multifactorial  previous urine studies have mostly been consistent with SIADH (uric acid is also low)  continue to trend, started free water restriction, improved Na.   c/w paroxetine, less likely etiology
previously has been attributed to Paroxetine vs. SIADH.  likely is multifactorial  previous urine studies have mostly been consistent with SIADH (uric acid is also low)  continue to trend, started free water restriction, improved Na.   c/w paroxetine, less likely etiology
previously has been attributed to Paroxetine vs. SIADH.  likely is multifactorial  previous urine studies have mostly been consistent with SIADH (uric acid is also low)  continue to trend, started free water restriction, improved Na.   c/w paroxetine, less likely etiology
unclear source, previously has been attributed to Paroxetine  likely is multifactorial  add on urine osmolality  urine sodium is low (first time it's been like that) which suggests hypovolemia (doesn't look like it) vs poly dipsia (per family, drinks a lot of fluids). urine osmolality will help  previous urine studies have mostly been consistent with SIADH (uric acid is also low)  continue to trend, may need water restriction  discuss with psych if paroxetine is needed
unclear source, previously has been attributed to Paroxetine  likely is multifactorial  previous urine studies have mostly been consistent with SIADH (uric acid is also low)  continue to trend, started free water restriction, improved Na.   c/w paroxetine, less likely etiology
previously has been attributed to Paroxetine vs. SIADH.  likely is multifactorial  previous urine studies have mostly been consistent with SIADH (uric acid is also low)  continue to trend, started free water restriction, improved Na.   c/w paroxetine, less likely etiology
unclear source, previously has been attributed to Paroxetine  likely is multifactorial  add on urine osmolality  urine sodium is low (first time it's been like that) which suggests hypovolemia (doesn't look like it) vs poly dipsia (per family, drinks a lot of fluids). urine osmolality will help  previous urine studies have mostly been consistent with SIADH (uric acid is also low)  continue to trend, may need water restriction  discuss with psych if paroxetine is needed

## 2023-03-29 NOTE — PROGRESS NOTE ADULT - PROBLEM SELECTOR PROBLEM 3
Chronic hyponatremia

## 2023-03-29 NOTE — BH CONSULTATION LIAISON PROGRESS NOTE - NSBHASSESSMENTFT_PSY_ALL_CORE
56yoM domiciled in supportive housing TSI, SSD, single, PMH HTN, DM, hypogammaglobulinemia, PPHx schizoaffective d/o, bipolar d/o, hx of multiple psych hospitalizations (last known in [id number] [facility]), denies hx SA/NSSIB, denies drug or alcohol use, denies legal hx, who BIB sister for bizarre behavior, admitted to medicine for significant cellulitis of the left buttocks with ulceration. Psychiatry consulted for psychosis/schizophrenia.    On assessment, patient appears to be calm and cooperative. He denies any mood, psychotic and manic symptoms. He was alert and oriented x3. Patient appears to have some decline in ability to care for self but does not seem to be stemming from depressive symptoms. Patient could be exhibiting some negative psychotic symptoms however pt with multiple medical factors that could be contributing to poor hygiene (not showering, chaotic apartment). Per sister, patient has been less hygienic and unable to make it to the bathroom. Differential include AMS from hypoxia vs infectious vs electrolyte derangement vs negative symptoms from schizophrenia vs physical restrictions impairing patient ability to be independent. Will gather collateral from psychiatrist, Dr. Cook for further information. Per [facility], patient did received Haldol dec 150 mg on 2/15 at [facility] clinic.    3/17: Patient is well, no note able signs of psychosis and denying all mood, psychotic or manic symptoms. Patient appears to have some concrete thought process but otherwise linear and rational. No concern for his safety. Recommend ordering Haldol dec 150 mg IM giving pt has qtc <500 and is due for this month. Discussed with team and concern for Paxil contributing to hyponatremia given past hospitalization with evidence for SIADH. Patient informed about Paxil being discontinued and he is agreeable.    3/21--- childish, can be impulsive at times- demanding to leave ama. Struggles to understand reasons to stay-- has LE cellulitis, is on IV antibiotics. No mason psychosis noted.     3/22-- concrete/simple, but cooperating with care. No further demands to leave AMA. No mason psychosis again. No PRNs.     3/27 -- remains concrete/simple, cooperating with care but also lacks capacity to determine dispo planning. No PRNs.    Plan:  - Routine Observation  - No standing psych meds at this time  - Received Haldol dec 150 mg IM on 3/17/2023.   - Cannot leave AMA, lacks capacity  - Cannot partake in dispo planning, lacks capacity (utilize surrogate decision maker)  - Dispo: please coordinate a safe d/c plan with sister Brittany; does NOT require inpatient psych care, can follow with existing providers. Next Haldol Dec is not due until 4/16/2023.   - [facility] Psych will sign off, but please call us as needed. No psych contraindication to discharge when medically cleared.

## 2023-03-29 NOTE — PHARMACOTHERAPY INTERVENTION NOTE - COMMENTS
58 yo M with MRSA bacteremia on IV vancomycin. Anticipated course until 4/11/2023. At this time, patient is receiving 1.5 g IV q8h with 2 missed doses in the past 3 days.  Most recent trough is 12.3 mg/mL on 3/29 at 11:07.    Vanco 1.5 g q8h AUC/HATTIE calculated to be 711  Vanco 1.5 g q12h AUC/HATTIE predicted to be 474  (optimal AUC/HATTIE ratio is 400-600)    Recommendation(s):  1) Based on AUC/HATTIE monitoring calculated using [employer], suggest changing vancomycin to 1500 mg IV q12h to achieve a target AUC of 400-600.    Rocio Clark, PharmD, BCIDP  Clinical Pharmacy Specialist, Infectious Diseases  Spectra extension [phone]  .

## 2023-03-29 NOTE — BH CONSULTATION LIAISON PROGRESS NOTE - MSE UNSTRUCTURED FT
Pt denied any SI, HI, AVH, paranoia, or safety concerns. However, he thinks he can go home "anytime and take care of my wounds myself." He does not understand that he requires IV ABX still, and thinks his wounds will simply "heal on their own." Insight poor. 
On exam, he is AA ox 3, simple/concrete but cooperative/pleasant. Says he wants to go home when able. Lives alone, wants to return to his [facility] outpatient psychiatrist. Denies any current depression, anxiety/panic, SI, HI, AVH, paranoia, or safety concerns. No focal neuro deficits.

## 2023-04-04 NOTE — CHART NOTE - NSCHARTNOTESELECT_GEN_ALL_CORE
Event Note
Follow-up/Nutrition Services
code ADRIANNE/Event Note
ACP/Event Note
ACP/Event Note
Dermatology/Event Note
Event Note
code ADRIANNE/Event Note

## 2023-04-04 NOTE — CHART NOTE - NSCHARTNOTEFT_GEN_A_CORE
LVM for patient to call back to discuss biopsy results, provided dermatology office phone number. Plan to discuss biopsy with nonspecific findings - chronic inflammation and fibrosis, no evidence of pyoderma gangrenosum or neoplasm. Plan to f/u in clinic. LVM for patient to call back to discuss biopsy results, provided dermatology office phone number. Plan to discuss biopsy with nonspecific findings - chronic inflammation and fibrosis, no evidence of pyoderma gangrenosum or neoplasm. Plan to f/u in clinic.    4/5 - LVM2 LVM for patient to call back to discuss biopsy results, provided dermatology office phone number. Plan to discuss biopsy with nonspecific findings - chronic inflammation and fibrosis, no evidence of pyoderma gangrenosum or neoplasm. Plan to f/u in clinic.    4/5 - LVM2    4/6 - LVM3

## 2023-04-05 RX ORDER — ACETAMINOPHEN 325 MG/1
325 TABLET ORAL
Qty: 0 | Refills: 0 | Status: COMPLETED | OUTPATIENT
Start: 2023-04-05 | End: 1900-01-01

## 2023-04-05 RX ORDER — DIPHENHYDRAMINE HCL 25 MG/1
25 TABLET ORAL
Qty: 0 | Refills: 0 | Status: COMPLETED | OUTPATIENT
Start: 2023-04-05 | End: 1900-01-01

## 2023-04-13 ENCOUNTER — APPOINTMENT (OUTPATIENT)
Dept: RHEUMATOLOGY | Facility: CLINIC | Age: 58
End: 2023-04-13
Payer: MEDICARE

## 2023-04-13 VITALS
DIASTOLIC BLOOD PRESSURE: 92 MMHG | SYSTOLIC BLOOD PRESSURE: 147 MMHG | RESPIRATION RATE: 16 BRPM | HEART RATE: 75 BPM | TEMPERATURE: 97.9 F | OXYGEN SATURATION: 95 %

## 2023-04-13 VITALS
SYSTOLIC BLOOD PRESSURE: 155 MMHG | DIASTOLIC BLOOD PRESSURE: 90 MMHG | HEART RATE: 90 BPM | OXYGEN SATURATION: 97 % | RESPIRATION RATE: 16 BRPM

## 2023-04-13 PROCEDURE — 96366 THER/PROPH/DIAG IV INF ADDON: CPT

## 2023-04-13 PROCEDURE — 96365 THER/PROPH/DIAG IV INF INIT: CPT

## 2023-04-13 RX ORDER — ACETAMINOPHEN 325 MG/1
325 TABLET ORAL
Qty: 0 | Refills: 0 | Status: COMPLETED
Start: 2023-04-05

## 2023-04-13 RX ORDER — IMMUNE GLOBULIN INTRAVENOUS (HUMAN) 10 G
10 KIT INTRAVENOUS
Qty: 0 | Refills: 0 | Status: COMPLETED | OUTPATIENT
Start: 2023-03-31

## 2023-04-13 RX ORDER — DIPHENHYDRAMINE HCL 25 MG/1
25 TABLET ORAL
Qty: 0 | Refills: 0 | Status: COMPLETED
Start: 2023-04-05

## 2023-04-13 NOTE — HISTORY OF PRESENT ILLNESS
[N/A] : N/A [Denies] : Denies [Left upper extremity] : Left upper extremity [24g] : 24g [Medication Name: ___] : Medication Name: [unfilled] [Start Time: ___] : Medication Start Time: [unfilled] [End Time: ___] : Medication End Time: [unfilled] [IV discontinued. Intact. No signs or symptoms of IV complications noted. Time: ___] : IV discontinued. Intact. No signs or symptoms of IV complications noted. Time: [unfilled] [Patient  instructed to seek medical attention with signs and symptoms of adverse effects] : Patient  instructed to seek medical attention with signs and symptoms of adverse effects [Patient left unit in no acute distress] : Patient left unit in no acute distress [Medications administered as ordered and tolerated well.] : Medications administered as ordered and tolerated well. [de-identified] : Left hand dorsal  [de-identified] : Patient presents for Gammagard S/D infusion today. Patient reports that he had been hospitalized due to his open wounds on legs. Wounds are healing and dry as per patient. Otherwise, patient denies of having any significant changes and denies of having any symptoms or complaints. Patient pre-medicated as per order. Infusion titrated as per protocol, and tolerated well.

## 2023-04-22 ENCOUNTER — EMERGENCY (EMERGENCY)
Facility: HOSPITAL | Age: 58
LOS: 1 days | Discharge: HOME | End: 2023-04-22
Attending: STUDENT IN AN ORGANIZED HEALTH CARE EDUCATION/TRAINING PROGRAM | Admitting: STUDENT IN AN ORGANIZED HEALTH CARE EDUCATION/TRAINING PROGRAM
Payer: MEDICARE

## 2023-04-22 VITALS
SYSTOLIC BLOOD PRESSURE: 145 MMHG | RESPIRATION RATE: 16 BRPM | HEIGHT: 74 IN | HEART RATE: 86 BPM | DIASTOLIC BLOOD PRESSURE: 73 MMHG | TEMPERATURE: 98 F | OXYGEN SATURATION: 95 %

## 2023-04-22 DIAGNOSIS — K08.199 COMPLETE LOSS OF TEETH DUE TO OTHER SPECIFIED CAUSE, UNSPECIFIED CLASS: Chronic | ICD-10-CM

## 2023-04-22 DIAGNOSIS — J34.2 DEVIATED NASAL SEPTUM: Chronic | ICD-10-CM

## 2023-04-22 PROCEDURE — 99284 EMERGENCY DEPT VISIT MOD MDM: CPT

## 2023-04-22 NOTE — PROVIDER CONTACT NOTE (OTHER) - ACTION/TREATMENT ORDERED:
Contacted [facility] again, spoke with Ana, ride set up with [employer], confirmation #[id number].  Confirmed ride with [employer] ([phone]).  ETA 5min.

## 2023-04-22 NOTE — PROVIDER CONTACT NOTE (OTHER) - ASSESSMENT
Confirmed address with patient.  Contacted [facility] ([phone]), spoke with Elizabeth Lazo set up with [employer], confirmation number [id number].

## 2023-04-22 NOTE — ED PROVIDER NOTE - PATIENT PORTAL LINK FT
You can access the FollowMyHealth Patient Portal offered by [facility] by registering at the following website: [email or web address]. By joining [facility]’s FollowMyHealth portal, you will also be able to view your health information using other applications (apps) compatible with our system.

## 2023-04-22 NOTE — PROVIDER CONTACT NOTE (OTHER) - SITUATION
Notified by provider that patient is ready fro discharge and requires transportation home.  Spoke with patient, and patient requests medicaid taxi home.

## 2023-04-22 NOTE — ED PROVIDER NOTE - CLINICAL SUMMARY MEDICAL DECISION MAKING FREE TEXT BOX
Abimbola CABRERA:   Exam vital stable nontoxic-appearing physical exam as above, patient presents for evaluation, he does not pose a threat to himself or others, he has no hallucinations, does not appear to have decompensated psychosis, his vitals are normal he has no physical complaints, patient was provided with reassurance, will give crisis center referral, and will follow-up with a psychiatrist, he is agreeable to follow up and go home at this time. He is comfortable taking a cab home.

## 2023-04-22 NOTE — ED PROVIDER NOTE - NSICDXPASTMEDICALHX_GEN_ALL_CORE_FT
PAST MEDICAL HISTORY:  Abscess of finger     Bipolar disorder     Chronic hyponatremia     CVID (common variable immunodeficiency)     DM (diabetes mellitus)     HTN (hypertension)     Hyponatremia     Smoker
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
## 2023-04-22 NOTE — ED PROVIDER NOTE - PHYSICAL EXAMINATION
Abimbola CABRERA:  VITALS: Initial triage and subsequent vitals have been reviewed by me.  GEN APPEARANCE: Alert, cooperative. Non-toxic appearing. Well appearing. NAD.  HEAD: Atraumatic, normocephalic   EYES: PERRLa, EOMI, vision grossly intact.   EARS: Gross hearing intact.   NOSE: No nasal discharge, no external evidence of epistaxis.   NECK: Supple  CV: RRR, S1S2, no c/r/m/g. No cyanosis. Extremities warm, well perfused. Cap refill <2 seconds. No bruits.   LUNGS: CTAB. No wheezing. No rales. No rhonchi. No diminished breath sounds.   ABDOMEN: Soft, NTND. No guarding or rebound. No masses.   MSK/EXT: Spine appears normal, no spine point tenderness. No CVA ttp. Normal muscular development. Pelvis stable. No obvious joint or bony deformity, no peripheral edema.   NEURO: Alert, follows commands. Weight bearing normal. Speech normal. Sensation and motor normal x4 extremities.   SKIN: Normal color for race, warm, dry and intact. No evidence of rash.  PSYCH: Normal mood and affect.

## 2023-04-22 NOTE — ED PROVIDER NOTE - NSFOLLOWUPINSTRUCTIONS_ED_ALL_ED_FT
Thank you for visiting our Emergency Department, it has been a pleasure taking part in your healthcare.  Please read and follow all of your discharge instructions. These instructions contain important information regarding your Emergency Department visit and future medical care.     Your discharge diagnosis is: encounter for medical screening exam  Please take all discharge medications as indicated below:  Please continue all medications as rx'd by your PMD.  Please follow up with your Primary Care Doctor (PMD) within x48 hours.  Bring and show your PMD all documents and results you were given during your ED visit.  If you do not have a primary care doctor please call [phone] to establish primary care.  A copy of resulted labs, imaging, and findings have been provided to you.   You can also access all of your results through the [facility].  If you have questions about your results, please call the Emergency Department.  During your visit and at time of discharge, you had a detailed discussion with your provider regarding your diagnosis, care management and discharge planning.  Topics that were discussed included but were not limited to: return precautions, follow up visits with existing or new providers, new prescriptions and/or medication changes, wound and/or splint/cast care, incidental laboratory/radiology findings, or other care   aspects specific to your diagnosis and treatment. You have been given the opportunity to have your questions answered. At this time you have been deemed stable and fit for discharge.  Return precautions to the Emergency Department include but are not limited to: unrelenting nausea, vomiting, fever, chills, chest pain, shortness of breath, dizziness, chest or abdominal pain, worsening back pain, syncope, blood in urine or stool, headache that doesn't resolve, numbness or tingling, loss of sensation, loss of motor function, or any other concerning symptoms.    Please bring all ED Documents you were given during your stay to your PMD.   They contain important information for you and your PMD, including incidental lab/radiology findings that your PMD should be aware of.

## 2023-04-23 PROBLEM — E87.1 HYPO-OSMOLALITY AND HYPONATREMIA: Chronic | Status: ACTIVE | Noted: 2023-03-17

## 2023-04-23 PROBLEM — D83.9 COMMON VARIABLE IMMUNODEFICIENCY, UNSPECIFIED: Chronic | Status: ACTIVE | Noted: 2023-03-15

## 2023-05-03 RX ORDER — IMMUNE GLOBULIN INFUSION (HUMAN) 100 MG/ML
30 INJECTION, SOLUTION INTRAVENOUS; SUBCUTANEOUS
Qty: 0 | Refills: 0 | Status: ACTIVE | OUTPATIENT
Start: 2023-04-17 | End: 1900-01-01

## 2023-05-04 ENCOUNTER — APPOINTMENT (OUTPATIENT)
Dept: RHEUMATOLOGY | Facility: CLINIC | Age: 58
End: 2023-05-04

## 2023-05-06 ENCOUNTER — INPATIENT (INPATIENT)
Facility: HOSPITAL | Age: 58
LOS: 30 days | Discharge: HOME HEALTH | End: 2023-06-06
Attending: INTERNAL MEDICINE | Admitting: INTERNAL MEDICINE
Payer: MEDICARE

## 2023-05-06 VITALS
HEIGHT: 74 IN | DIASTOLIC BLOOD PRESSURE: 76 MMHG | HEART RATE: 100 BPM | TEMPERATURE: 99 F | OXYGEN SATURATION: 100 % | RESPIRATION RATE: 18 BRPM | SYSTOLIC BLOOD PRESSURE: 145 MMHG

## 2023-05-06 DIAGNOSIS — J34.2 DEVIATED NASAL SEPTUM: Chronic | ICD-10-CM

## 2023-05-06 DIAGNOSIS — L03.317 CELLULITIS OF BUTTOCK: ICD-10-CM

## 2023-05-06 DIAGNOSIS — K08.199 COMPLETE LOSS OF TEETH DUE TO OTHER SPECIFIED CAUSE, UNSPECIFIED CLASS: Chronic | ICD-10-CM

## 2023-05-06 LAB
ALBUMIN SERPL ELPH-MCNC: 3.8 G/DL — SIGNIFICANT CHANGE UP (ref 3.3–5)
ALP SERPL-CCNC: 163 U/L — HIGH (ref 40–120)
ALT FLD-CCNC: 20 U/L — SIGNIFICANT CHANGE UP (ref 4–41)
ANION GAP SERPL CALC-SCNC: 15 MMOL/L — HIGH (ref 7–14)
APPEARANCE UR: CLEAR — SIGNIFICANT CHANGE UP
APTT BLD: 30.1 SEC — SIGNIFICANT CHANGE UP (ref 27–36.3)
AST SERPL-CCNC: 32 U/L — SIGNIFICANT CHANGE UP (ref 4–40)
BASOPHILS # BLD AUTO: 0 K/UL — SIGNIFICANT CHANGE UP (ref 0–0.2)
BASOPHILS NFR BLD AUTO: 0 % — SIGNIFICANT CHANGE UP (ref 0–2)
BILIRUB SERPL-MCNC: 0.2 MG/DL — SIGNIFICANT CHANGE UP (ref 0.2–1.2)
BILIRUB UR-MCNC: NEGATIVE — SIGNIFICANT CHANGE UP
BLOOD GAS VENOUS COMPREHENSIVE RESULT: SIGNIFICANT CHANGE UP
BUN SERPL-MCNC: 5 MG/DL — LOW (ref 7–23)
CALCIUM SERPL-MCNC: 8.8 MG/DL — SIGNIFICANT CHANGE UP (ref 8.4–10.5)
CHLORIDE SERPL-SCNC: 85 MMOL/L — LOW (ref 98–107)
CO2 SERPL-SCNC: 22 MMOL/L — SIGNIFICANT CHANGE UP (ref 22–31)
COLOR SPEC: COLORLESS — SIGNIFICANT CHANGE UP
CREAT SERPL-MCNC: 0.67 MG/DL — SIGNIFICANT CHANGE UP (ref 0.5–1.3)
DIFF PNL FLD: NEGATIVE — SIGNIFICANT CHANGE UP
EGFR: 110 ML/MIN/1.73M2 — SIGNIFICANT CHANGE UP
EOSINOPHIL # BLD AUTO: 0 K/UL — SIGNIFICANT CHANGE UP (ref 0–0.5)
EOSINOPHIL NFR BLD AUTO: 0 % — SIGNIFICANT CHANGE UP (ref 0–6)
GLUCOSE SERPL-MCNC: 116 MG/DL — HIGH (ref 70–99)
GLUCOSE UR QL: NEGATIVE — SIGNIFICANT CHANGE UP
HCT VFR BLD CALC: 35.3 % — LOW (ref 39–50)
HGB BLD-MCNC: 11.9 G/DL — LOW (ref 13–17)
IANC: 9.79 K/UL — HIGH (ref 1.8–7.4)
INR BLD: 1.26 RATIO — HIGH (ref 0.88–1.16)
KETONES UR-MCNC: NEGATIVE — SIGNIFICANT CHANGE UP
LEUKOCYTE ESTERASE UR-ACNC: NEGATIVE — SIGNIFICANT CHANGE UP
LYMPHOCYTES # BLD AUTO: 1.29 K/UL — SIGNIFICANT CHANGE UP (ref 1–3.3)
LYMPHOCYTES # BLD AUTO: 9.7 % — LOW (ref 13–44)
MCHC RBC-ENTMCNC: 26.7 PG — LOW (ref 27–34)
MCHC RBC-ENTMCNC: 33.7 GM/DL — SIGNIFICANT CHANGE UP (ref 32–36)
MCV RBC AUTO: 79.1 FL — LOW (ref 80–100)
MONOCYTES # BLD AUTO: 1.87 K/UL — HIGH (ref 0–0.9)
MONOCYTES NFR BLD AUTO: 14 % — SIGNIFICANT CHANGE UP (ref 2–14)
NEUTROPHILS # BLD AUTO: 10.18 K/UL — HIGH (ref 1.8–7.4)
NEUTROPHILS NFR BLD AUTO: 73.7 % — SIGNIFICANT CHANGE UP (ref 43–77)
NITRITE UR-MCNC: NEGATIVE — SIGNIFICANT CHANGE UP
PH UR: 7 — SIGNIFICANT CHANGE UP (ref 5–8)
PLATELET # BLD AUTO: 274 K/UL — SIGNIFICANT CHANGE UP (ref 150–400)
POTASSIUM SERPL-MCNC: 4.7 MMOL/L — SIGNIFICANT CHANGE UP (ref 3.5–5.3)
POTASSIUM SERPL-SCNC: 4.7 MMOL/L — SIGNIFICANT CHANGE UP (ref 3.5–5.3)
PROT SERPL-MCNC: 6.9 G/DL — SIGNIFICANT CHANGE UP (ref 6–8.3)
PROT UR-MCNC: ABNORMAL
PROTHROM AB SERPL-ACNC: 14.7 SEC — HIGH (ref 10.5–13.4)
RBC # BLD: 4.46 M/UL — SIGNIFICANT CHANGE UP (ref 4.2–5.8)
RBC # FLD: 15.8 % — HIGH (ref 10.3–14.5)
SODIUM SERPL-SCNC: 122 MMOL/L — LOW (ref 135–145)
SP GR SPEC: 1.02 — SIGNIFICANT CHANGE UP (ref 1.01–1.05)
UROBILINOGEN FLD QL: SIGNIFICANT CHANGE UP
WBC # BLD: 13.34 K/UL — HIGH (ref 3.8–10.5)
WBC # FLD AUTO: 13.34 K/UL — HIGH (ref 3.8–10.5)

## 2023-05-06 PROCEDURE — 74177 CT ABD & PELVIS W/CONTRAST: CPT | Mod: 26,MA

## 2023-05-06 PROCEDURE — 99285 EMERGENCY DEPT VISIT HI MDM: CPT | Mod: FS

## 2023-05-06 PROCEDURE — 72170 X-RAY EXAM OF PELVIS: CPT | Mod: 26

## 2023-05-06 PROCEDURE — 72193 CT PELVIS W/DYE: CPT | Mod: 26,59,MA

## 2023-05-06 RX ORDER — CEFEPIME 1 G/1
1000 INJECTION, POWDER, FOR SOLUTION INTRAMUSCULAR; INTRAVENOUS ONCE
Refills: 0 | Status: COMPLETED | OUTPATIENT
Start: 2023-05-06 | End: 2023-05-06

## 2023-05-06 RX ORDER — SODIUM CHLORIDE 9 MG/ML
1000 INJECTION, SOLUTION INTRAVENOUS ONCE
Refills: 0 | Status: COMPLETED | OUTPATIENT
Start: 2023-05-06 | End: 2023-05-06

## 2023-05-06 RX ORDER — MORPHINE SULFATE 50 MG/1
4 CAPSULE, EXTENDED RELEASE ORAL ONCE
Refills: 0 | Status: DISCONTINUED | OUTPATIENT
Start: 2023-05-06 | End: 2023-05-06

## 2023-05-06 RX ORDER — VANCOMYCIN HCL 1 G
1000 VIAL (EA) INTRAVENOUS ONCE
Refills: 0 | Status: COMPLETED | OUTPATIENT
Start: 2023-05-06 | End: 2023-05-06

## 2023-05-06 RX ORDER — ACETAMINOPHEN 500 MG
1000 TABLET ORAL ONCE
Refills: 0 | Status: COMPLETED | OUTPATIENT
Start: 2023-05-06 | End: 2023-05-06

## 2023-05-06 RX ADMIN — Medication 400 MILLIGRAM(S): at 14:49

## 2023-05-06 RX ADMIN — MORPHINE SULFATE 4 MILLIGRAM(S): 50 CAPSULE, EXTENDED RELEASE ORAL at 20:29

## 2023-05-06 RX ADMIN — SODIUM CHLORIDE 1000 MILLILITER(S): 9 INJECTION, SOLUTION INTRAVENOUS at 16:21

## 2023-05-06 RX ADMIN — MORPHINE SULFATE 4 MILLIGRAM(S): 50 CAPSULE, EXTENDED RELEASE ORAL at 20:44

## 2023-05-06 RX ADMIN — Medication 1000 MILLIGRAM(S): at 15:05

## 2023-05-06 RX ADMIN — Medication 250 MILLIGRAM(S): at 16:21

## 2023-05-06 RX ADMIN — CEFEPIME 100 MILLIGRAM(S): 1 INJECTION, POWDER, FOR SOLUTION INTRAMUSCULAR; INTRAVENOUS at 17:51

## 2023-05-06 NOTE — ED PROVIDER NOTE - ATTENDING APP SHARED VISIT CONTRIBUTION OF CARE
57-year-old male with past medical history bipolar, schizophrenia, high flow agammaglobulinemia (monthly IVIG), HTN, DM 2, common variable immunodeficiency presenting to the ER with right sided gluteal wound.  Patient states for the past week he has right gluteal pain and swelling, redness to the area with wound and pus drainage.  Patient signed himself out of [facility] 2 days ago and has been taking clindamycin 3 times a day but symptoms have gotten worse.  Additionally patient reports mild left-sided lower abdominal pain.  Patient denies fever/chills, nausea, vomiting, diarrhea, dysuria, difficulty with bowel movements, CP, SOB, headache, dizziness, weakness or any other concerns.

## 2023-05-06 NOTE — ED ADULT NURSE REASSESSMENT NOTE - NS ED NURSE REASSESS COMMENT FT1
Patient A&O4. Respirations even and unlabored. No signs of acute distress noted. Comfort and safety maintained. Report given to ESSU GARCIA Hoang.
Patient sleeping in stretcher. Easily aroused by verbal stimuli. Respirations even and unlabored. Spontaneous movement of all extremities noted. No signs of acute distress noted. No acute changes in medical condition. Pending further interventions Comfort and safety maintained. All current care needs met. Care plan continued Katie ZELAYA

## 2023-05-06 NOTE — ED PROVIDER NOTE - CLINICAL SUMMARY MEDICAL DECISION MAKING FREE TEXT BOX
57-year-old male with past medical history bipolar, schizophrenia, high flow agammaglobulinemia (monthly IVIG), HTN, DM 2, common variable immunodeficiency presenting to the ER with right sided gluteal wound. Patient signed out from [facility] 2 days ago, has been taking clindamycin and symptoms have been worsening.  On exam patient is well-appearing, oral temp 99, heart rate 100, right gluteal region with diffuse erythema and swelling with central wounds with purulent drainage.  No crepitus on exam.  Concern for cellulitis possible abscess.  Plan: Sepsis labs, CBC/CMP, blood gas, IV fluids, broad-spectrum antibiotics, CT abdomen and pelvis, pain control. 57-year-old male with past medical history bipolar, schizophrenia,  hypogammaglobulinemia (monthly IVIG), HTN, DM 2, common variable immunodeficiency presenting to the ER with right sided gluteal wound. Patient signed out from [facility] 2 days ago, has been taking clindamycin and symptoms have been worsening.  On exam patient is well-appearing, oral temp 99, heart rate 100, right gluteal region with diffuse erythema and swelling with central wounds with purulent drainage.  No crepitus on exam.  Concern for cellulitis possible abscess.  Plan: Sepsis labs, CBC/CMP, blood gas, IV fluids, broad-spectrum antibiotics, CT abdomen and pelvis, pain control.

## 2023-05-06 NOTE — ED PROVIDER NOTE - PROGRESS NOTE DETAILS
GERMAN Silverio: Pt reassessed, reports improvement in pain, offered and declined further pain medication, spoke with CT need rpt pelvis as right buttock not fully visualized. GERMAN Silverio: CT resulted without evidence of abscess or gas, likely cellulitis. Spoke with hospitalist who accepts patient, requesting surgery consult. Paged surgery GERMAN Silverio: Spoke with surgery, will see pt in the ED GERMAN Silverio: CT resulted without evidence of abscess or gas, likely cellulitis. Spoke with hospitalist who accepts patient, requesting surgery consult. Discussed admission with pt. Paged surgery

## 2023-05-06 NOTE — ED PROVIDER NOTE - PHYSICAL EXAMINATION
Skin: right gluteal region with diffuse redness and swelling, [id number] central wound with purulent drainage, no crepitus, no streaking

## 2023-05-06 NOTE — ED ADULT NURSE NOTE - CAS TRG GEN SKIN COLOR
RHEUMATOLOGY FOLLOW UP VISIT    Name: Sena Guardado  : 1942     CHIEF COMPLAINT         Sena Guardado is a 77 year old female presents to the office for back pain and follow up RA last seen about a year ago      HISTORY OF PRESENT ILLNESS  Patient with previous diagnosis of RA, bck pain , OP here today for follow up . has been doing worse since the last visit, oer the past 3 days has had more back spasm , using a back brace and it helps and has taken some ibuprofen on occasion and seems to help more than Tylenol.  She has been given tramadol last year but ran out.  She feels the ibuprofen may help more but would like something a bit stronger.  She has not had any lab work since October of last year.  She was sent for DEXA scan and has done osteoporosis was given alendronate did not tolerate it.  Now complaining of some low back pain.  She also has some tenderness in the palmar side of the right middle finger MCP.  Some inability to fully close the finger and but no swelling other than the left middle finger MCP.  The back brace does seem to help.  She has had no other significant symptoms on her review.  Joint Exam        Right  Left   Glenohumeral   Tender   Tender   MCP 3   Tender  Swollen Tender   Lumbar Spine   Tender          she has following chronic conditions.  Patient Active Problem List   Diagnosis   • Vertigo   • Thigh pain, musculoskeletal   • Rheumatoid arthritis (CMS/HCC)   • Pain, upper back   • Myalgia   • Muscle spasm of back   • Left-sided low back pain with left-sided sciatica   • Essential hypertension   • Hypothyroidism   • Hyperlipidemia   • Degeneration, intervertebral disc, lumbar   • Visit for screening mammogram   • Pain of left calf   • Major depressive disorder, recurrent episode, mild (CMS/HCC)   • Trigger middle finger of right hand   • Age-related osteoporosis without current pathological fracture         Past Medical History:   Diagnosis Date   • Bronchitis 2019   • Otitis  media of left ear 1/8/2019        Current Outpatient Medications   Medication Sig Last Dose   • traMADol (ULTRAM) 50 MG tablet Take 0.5 tablets by mouth 3 times daily as needed for Pain.    • meloxicam (MOBIC) 7.5 MG tablet Take 1 tablet by mouth daily.    • [START ON 5/4/2020] methotrexate (RHEUMATREX) 2.5 MG tablet Take 4 tablets by mouth 1 day a week.    • zoledronic acid (RECLAST) 5 MG/100ML injectable solution Inject 100 mLs into the vein every 12 months.    • azithromycin (ZITHROMAX) 250 MG tablet Take 1 tablet by mouth daily. Take 2 tablets on day 1, 1 tablet daily for next 4 days    • fluticasone (FLONASE) 50 MCG/ACT nasal spray Spray 1 spray in each nostril 2 times daily.    • benzonatate (TESSALON PERLES) 100 MG capsule Take 1 capsule by mouth 3 times daily as needed for Cough.    • aspirin 81 MG EC tablet Take 1 tablet by mouth daily. Taking at Unknown time   • atorvastatin (LIPITOR) 20 MG tablet Take 1 tablet by mouth daily. Taking at Unknown time   • metoPROLOL succinate (TOPROL-XL) 50 MG 24 hr tablet Take 1 tablet by mouth daily. Taking at Unknown time   • levothyroxine (SYNTHROID, LEVOTHROID) 100 MCG tablet Take 1 tablet by mouth daily. Taking at Unknown time   • dorzolamide-timolol (COSOPT) 22.3-6.8 MG/ML ophthalmic solution PLACE 1 DROP INTO BOTH EYES 2 TIMES A DAY Taking at Unknown time   • latanoprost (XALATAN) 0.005 % ophthalmic solution PLACE 1 DROP INTO BOTH EYES AT BEDTIME Taking at Unknown time   • LOTEMAX 0.5 % ophthalmic gel PLACE 1 DROP INTO BOTH EYES 4 TIMES PER DAY Taking at Unknown time   • ACETAMINOPHEN ER PO  Taking at Unknown time   • CALCIUM 600-D 600-400 MG-UNIT Tab TOME ANJEL TABLETA TODOS LOS SOLIMAN Taking at Unknown time   • folic acid (FOLATE) 1 MG tablet Take 1 tablet by mouth daily. Taking at Unknown time   • omeprazole 20 MG tablet Take 1 tablet by mouth daily. Taking at Unknown time     No current facility-administered medications for this visit.        REVIEW OF SYSTEMS  A  comprehensive review of systems was negative.    PHYSICAL EXAMINATION  BP (!) 182/94   Pulse 70   Temp 98.2 °F (36.8 °C) (Oral)   Physical Exam  Constitutional:       General: She is not in acute distress.     Appearance: Normal appearance. She is well-developed. She is not ill-appearing.   HENT:      Head: Normocephalic.   Eyes:      Conjunctiva/sclera: Conjunctivae normal.   Neck:      Musculoskeletal: Normal range of motion and neck supple.      Thyroid: No thyromegaly.   Cardiovascular:      Rate and Rhythm: Normal rate.   Pulmonary:      Effort: Pulmonary effort is normal. No respiratory distress.      Breath sounds: Normal breath sounds.   Musculoskeletal: Normal range of motion.         General: Tenderness present.      Comments: Tenderness on the right paravertebral muscles.  There is prominent tenderness over the L4-5 area.  No step-off, and no instability no warmth.  Similar to her previous exam.  Trigger finger right middle finger   Lymphadenopathy:      Cervical: No cervical adenopathy.   Skin:     General: Skin is warm and dry.      Findings: No erythema or rash.   Neurological:      Mental Status: She is alert and oriented to person, place, and time.      Cranial Nerves: No cranial nerve deficit.   Psychiatric:         Behavior: Behavior normal.         LAB    Lab Services on 10/31/2019   Component Date Value Ref Range Status   • WBC 10/31/2019 5.2  4.2 - 11.0 K/mcL Final   • RBC 10/31/2019 4.55  4.00 - 5.20 mil/mcL Final   • HGB 10/31/2019 14.3  12.0 - 15.5 g/dL Final   • HCT 10/31/2019 43.7  36.0 - 46.5 % Final   • MCV 10/31/2019 96.0  78.0 - 100.0 fl Final   • MCH 10/31/2019 31.4  26.0 - 34.0 pg Final   • MCHC 10/31/2019 32.7  32.0 - 36.5 g/dL Final   • RDW-CV 10/31/2019 12.9  11.0 - 15.0 % Final   • PLT 10/31/2019 215  140 - 450 K/mcL Final   • NRBC 10/31/2019 0  0 /100 WBC Final   • DIFF TYPE 10/31/2019 AUTOMATED DIFFERENTIAL   Final   • Neutrophil 10/31/2019 43  % Final   • LYMPH 10/31/2019 46   % Final   • MONO 10/31/2019 8  % Final   • EOSIN 10/31/2019 2  % Final   • BASO 10/31/2019 1  % Final   • Percent Immature Granuloctyes 10/31/2019 0  % Final   • Absolute Neutrophil 10/31/2019 2.3  1.8 - 7.7 K/mcL Final   • Absolute Lymph 10/31/2019 2.4  1.0 - 4.0 K/mcL Final   • Absolute Mono 10/31/2019 0.4  0.3 - 0.9 K/mcL Final   • Absolute Eos 10/31/2019 0.1  0.1 - 0.5 K/mcL Final   • Absolute Baso 10/31/2019 0.1  0.0 - 0.3 K/mcL Final   • Absolute Immature Granulocytes 10/31/2019 0.0  0 - 0.2 K/mcl Final   • TSH 10/31/2019 1.834  0.350 - 5.000 mcUnits/mL Final    Comment: Findings most consistent with euthyroid state, no additional testing suggested. TSH may be normal in patients with thyroid dysfunction and pituitary disease. Clinical correlation recommended.  (Reflex TSH algorithm is not recommended in hospitalized patients. A variety of drugs, as well as serious acute and chronic illnesses may alter thyroid function tests. Commonly implicated drugs include glucocorticoids, dopamine, carbamazepine, iodine,   amiodarone, lithium and heparin.)     • MICROALBUMIN, UA (TTL) 10/31/2019 <0.50  mg/dL Final   • CREATININE, URINE (TOTAL) 10/31/2019 36.00  mg/dL Final   • MICROALBUMIN/CREATININE 10/31/2019 UNABLE TO CALCULATE DUE TO LOW ANALYTE CONCENTRATION.  <30 mg/g Final   • FASTING STATUS 10/31/2019 UNKNOWN  hrs Final   • CHOLESTEROL 10/31/2019 227* <200 mg/dL Final    Comment:    Desirable            <200  Borderline High      200 to 239  High                 >=240        • CALCULATED LDL 10/31/2019 151* <130 mg/dL Final    Comment: OPTIMAL               <100  NEAR OPTIMAL          100-129  BORDERLINE HIGH       130-159  HIGH                  160-189  VERY HIGH             >=190     • HDL 10/31/2019 37* >49 mg/dL Final    Comment: Low            <40  Borderline Low 40 to 49  Near Optimal   50 to 59  Optimal        >=60     • TRIGLYCERIDE 10/31/2019 193* <150 mg/dL Final    Comment: Normal                    <150  Borderline High          150 to 199  High                     200 to 499  Very High                >=500     • CALCULATED NON HDL 10/31/2019 190  mg/dL Final    Comment:    Therapeutic Target:  CHD and risk equivalents <130  Multiple risk factors    <160  0 to 1 risk factors      <190        • CHOL/HDL 10/31/2019 6.1* <4.5 Final   • Fasting Status 10/31/2019 UNKNOWN  hrs Final   • Sodium 10/31/2019 142  135 - 145 mmol/L Final   • Potassium 10/31/2019 4.4  3.4 - 5.1 mmol/L Final   • Chloride 10/31/2019 108* 98 - 107 mmol/L Final   • Carbon Dioxide 10/31/2019 28  21 - 32 mmol/L Final   • Anion Gap 10/31/2019 10  10 - 20 mmol/L Final   • Glucose 10/31/2019 84  65 - 99 mg/dL Final   • BUN 10/31/2019 12  6 - 20 mg/dL Final   • Creatinine 10/31/2019 0.80  0.51 - 0.95 mg/dL Final   • GFR Estimate,  10/31/2019 84   Final    eGFR 60 - 89 mL/min/1.73m2 = Mild decrease in kidney function.   • GFR Estimate, Non  10/31/2019 72   Final    eGFR 60 - 89 mL/min/1.73m2 = Mild decrease in kidney function.   • BUN/Creatinine Ratio 10/31/2019 15  7 - 25 Final   • CALCIUM 10/31/2019 8.7  8.4 - 10.2 mg/dL Final   • TOTAL BILIRUBIN 10/31/2019 0.7  0.2 - 1.0 mg/dL Final   • AST/SGOT 10/31/2019 18  <38 Units/L Final   • ALT/SGPT 10/31/2019 19  <64 Units/L Final   • ALK PHOSPHATASE 10/31/2019 86  45 - 117 Units/L Final   • TOTAL PROTEIN 10/31/2019 7.1  6.4 - 8.2 g/dL Final   • Albumin 10/31/2019 3.6  3.6 - 5.1 g/dL Final   • GLOBULIN 10/31/2019 3.5  2.0 - 4.0 g/dL Final   • A/G Ratio, Serum 10/31/2019 1.0  1.0 - 2.4 Final       IMAGING  MAMMO ADVOCATE PROCEDURE  Narrative: Accession #    [id number]    EXAM: MA DEXASCAN AXIAL    CLINICAL INDICATION: Postmenopausal    COMPARISON: None.  Impression: FINDINGS AND IMPRESSION:    Axial skeletal osteoporosis with bone mineral density of 0.727 gm/cm2 correspond with a T value   of -2.9 / Z value of -0.5 on lumbar spine evaluation.    Appendicular skeletal  osteoporosis with bone mineral density of 0.491 g/sq cm corresponding to   the T value of -3.2/ Z value of -1.2 on left femoral evaluation.    Appendicular skeletal osteoporosis with bone mineral density of 0.405 g/sq cm corresponding to   the T value of -3.1/ Z value of -0.5 on left forearm evaluation.    This increases the patient's risk for fracture.    -----  FINAL  -----    Dictated By:     MILVIA FLORES MD    Electronically Reviewed and Approved By:    MILVIA FLORES MD      ASSESSMENT/PLAN  Diagnoses and all orders for this visit:  Muscle spasm of back  Chronic low back pain without sciatica, unspecified back pain laterality  -     traMADol (ULTRAM) 50 MG tablet; Take 0.5 tablets by mouth 3 times daily as needed for Pain.  -     meloxicam (MOBIC) 7.5 MG tablet; Take 1 tablet by mouth daily.  -     methotrexate (RHEUMATREX) 2.5 MG tablet; Take 4 tablets by mouth 1 day a week.  -     CBC WITH DIFFERENTIAL; Future  -     COMPREHENSIVE METABOLIC PANEL; Future  -     SEDIMENTATION RATE WESTERGREN; Future  -     C REACTIVE PROTEIN; Future  Rheumatoid arthritis, involving unspecified site, unspecified rheumatoid factor presence (CMS/HCC)  -     traMADol (ULTRAM) 50 MG tablet; Take 0.5 tablets by mouth 3 times daily as needed for Pain.  -     meloxicam (MOBIC) 7.5 MG tablet; Take 1 tablet by mouth daily.  -     methotrexate (RHEUMATREX) 2.5 MG tablet; Take 4 tablets by mouth 1 day a week.  -     CBC WITH DIFFERENTIAL; Future  -     COMPREHENSIVE METABOLIC PANEL; Future  -     SEDIMENTATION RATE WESTERGREN; Future  -     C REACTIVE PROTEIN; Future  Age-related osteoporosis without current pathological fracture  -     zoledronic acid (RECLAST) 5 MG/100ML injectable solution; Inject 100 mLs into the vein every 12 months.      DISCUSSION:    Patient with osteoporosis intolerant to oral alendronate.  We will try her on Reclast.,  Refilled methotrexate, she will go for labs today.  Meloxicam added for back pain.  When  possible may need to do additional x-rays although the previous x-rays in 2017 when she was having similar problem did not demonstrate significant degenerative changes.  Risks of medical conditions and side effects of medication were discussed.        Electronically Signed by: Mikal Plascencia MD     Normal for race

## 2023-05-06 NOTE — ED ADULT NURSE NOTE - OBJECTIVE STATEMENT
Patient received in stretcher. AOX4. Respirations even and unlabored. Spontaneous movement of all extremities noted. Presents to ER c/o cellulitis to buttock. As per patient he has been taking AX with no relief of s/s. IV placed. Labs drawn. Medicated as per MD orders. Comfort and safety maintained. All current care needs met. Care plan continued Katie ZELAYA

## 2023-05-06 NOTE — ED ADULT TRIAGE NOTE - CHIEF COMPLAINT QUOTE
c/o right buttock pain stats due to cellulitis, being treated with Clindamycin with no relief, hx of HIV and bipolar disorder, denies SI, HI or AVH, calm and cooperative, well groomed and nourished.

## 2023-05-06 NOTE — ED PROVIDER NOTE - OBJECTIVE STATEMENT
57-year-old male with past medical history bipolar, schizophrenia, high flow agammaglobulinemia (monthly IVIG), HTN, DM 2, common variable immunodeficiency presenting to the ER with right sided gluteal wound.  Patient states for the past week he has right gluteal pain and swelling, redness to the area with wound and pus drainage.  Patient signed himself out of [facility] 2 days ago and has been taking clindamycin 3 times a day but symptoms have gotten worse.  Additionally patient reports mild left-sided lower abdominal pain.  Patient denies fever/chills, nausea, vomiting, diarrhea, dysuria, difficulty with bowel movements, CP, SOB, headache, dizziness, weakness or any other concerns. 57-year-old male with past medical history bipolar, schizophrenia, hypogammaglobulinemia (monthly IVIG), HTN, DM 2, common variable immunodeficiency presenting to the ER with right sided gluteal wound.  Patient states for the past week he has right gluteal pain and swelling, redness to the area with wound and pus drainage.  Patient signed himself out of [facility] 2 days ago and has been taking clindamycin 3 times a day but symptoms have gotten worse.  Additionally patient reports mild left-sided lower abdominal pain.  Patient denies fever/chills, nausea, vomiting, diarrhea, dysuria, difficulty with bowel movements, CP, SOB, headache, dizziness, weakness or any other concerns. Pt denies SI/HI

## 2023-05-07 DIAGNOSIS — I10 ESSENTIAL (PRIMARY) HYPERTENSION: ICD-10-CM

## 2023-05-07 DIAGNOSIS — Z29.9 ENCOUNTER FOR PROPHYLACTIC MEASURES, UNSPECIFIED: ICD-10-CM

## 2023-05-07 DIAGNOSIS — L03.90 CELLULITIS, UNSPECIFIED: ICD-10-CM

## 2023-05-07 DIAGNOSIS — F25.9 SCHIZOAFFECTIVE DISORDER, UNSPECIFIED: ICD-10-CM

## 2023-05-07 DIAGNOSIS — E55.9 VITAMIN D DEFICIENCY, UNSPECIFIED: ICD-10-CM

## 2023-05-07 DIAGNOSIS — A41.9 SEPSIS, UNSPECIFIED ORGANISM: ICD-10-CM

## 2023-05-07 DIAGNOSIS — F31.9 BIPOLAR DISORDER, UNSPECIFIED: ICD-10-CM

## 2023-05-07 DIAGNOSIS — J47.9 BRONCHIECTASIS, UNCOMPLICATED: ICD-10-CM

## 2023-05-07 DIAGNOSIS — D83.9 COMMON VARIABLE IMMUNODEFICIENCY, UNSPECIFIED: ICD-10-CM

## 2023-05-07 DIAGNOSIS — R63.8 OTHER SYMPTOMS AND SIGNS CONCERNING FOOD AND FLUID INTAKE: ICD-10-CM

## 2023-05-07 DIAGNOSIS — E87.1 HYPO-OSMOLALITY AND HYPONATREMIA: ICD-10-CM

## 2023-05-07 LAB
ALBUMIN SERPL ELPH-MCNC: 3.4 G/DL — SIGNIFICANT CHANGE UP (ref 3.3–5)
ALP SERPL-CCNC: 176 U/L — HIGH (ref 40–120)
ALT FLD-CCNC: 16 U/L — SIGNIFICANT CHANGE UP (ref 4–41)
ANION GAP SERPL CALC-SCNC: 14 MMOL/L — SIGNIFICANT CHANGE UP (ref 7–14)
AST SERPL-CCNC: 15 U/L — SIGNIFICANT CHANGE UP (ref 4–40)
BASOPHILS # BLD AUTO: 0.05 K/UL — SIGNIFICANT CHANGE UP (ref 0–0.2)
BASOPHILS NFR BLD AUTO: 0.3 % — SIGNIFICANT CHANGE UP (ref 0–2)
BILIRUB SERPL-MCNC: 0.2 MG/DL — SIGNIFICANT CHANGE UP (ref 0.2–1.2)
BUN SERPL-MCNC: 7 MG/DL — SIGNIFICANT CHANGE UP (ref 7–23)
CALCIUM SERPL-MCNC: 8.9 MG/DL — SIGNIFICANT CHANGE UP (ref 8.4–10.5)
CHLORIDE SERPL-SCNC: 88 MMOL/L — LOW (ref 98–107)
CO2 SERPL-SCNC: 24 MMOL/L — SIGNIFICANT CHANGE UP (ref 22–31)
CREAT SERPL-MCNC: 0.73 MG/DL — SIGNIFICANT CHANGE UP (ref 0.5–1.3)
CULTURE RESULTS: NO GROWTH — SIGNIFICANT CHANGE UP
D DIMER BLD IA.RAPID-MCNC: 437 NG/ML DDU — HIGH
EGFR: 107 ML/MIN/1.73M2 — SIGNIFICANT CHANGE UP
EOSINOPHIL # BLD AUTO: 0.05 K/UL — SIGNIFICANT CHANGE UP (ref 0–0.5)
EOSINOPHIL NFR BLD AUTO: 0.3 % — SIGNIFICANT CHANGE UP (ref 0–6)
GLUCOSE BLDC GLUCOMTR-MCNC: 118 MG/DL — HIGH (ref 70–99)
GLUCOSE BLDC GLUCOMTR-MCNC: 122 MG/DL — HIGH (ref 70–99)
GLUCOSE BLDC GLUCOMTR-MCNC: 126 MG/DL — HIGH (ref 70–99)
GLUCOSE BLDC GLUCOMTR-MCNC: 90 MG/DL — SIGNIFICANT CHANGE UP (ref 70–99)
GLUCOSE SERPL-MCNC: 107 MG/DL — HIGH (ref 70–99)
GRAM STN FLD: SIGNIFICANT CHANGE UP
HCT VFR BLD CALC: 36.6 % — LOW (ref 39–50)
HGB BLD-MCNC: 12.1 G/DL — LOW (ref 13–17)
HIV 1+2 AB+HIV1 P24 AG SERPL QL IA: SIGNIFICANT CHANGE UP
IANC: 12.75 K/UL — HIGH (ref 1.8–7.4)
IMM GRANULOCYTES NFR BLD AUTO: 1.2 % — HIGH (ref 0–0.9)
LYMPHOCYTES # BLD AUTO: 0.86 K/UL — LOW (ref 1–3.3)
LYMPHOCYTES # BLD AUTO: 5.5 % — LOW (ref 13–44)
MAGNESIUM SERPL-MCNC: 2 MG/DL — SIGNIFICANT CHANGE UP (ref 1.6–2.6)
MCHC RBC-ENTMCNC: 26.8 PG — LOW (ref 27–34)
MCHC RBC-ENTMCNC: 33.1 GM/DL — SIGNIFICANT CHANGE UP (ref 32–36)
MCV RBC AUTO: 81.2 FL — SIGNIFICANT CHANGE UP (ref 80–100)
METHOD TYPE: SIGNIFICANT CHANGE UP
MONOCYTES # BLD AUTO: 1.78 K/UL — HIGH (ref 0–0.9)
MONOCYTES NFR BLD AUTO: 11.4 % — SIGNIFICANT CHANGE UP (ref 2–14)
MRSA SPEC QL CULT: SIGNIFICANT CHANGE UP
NEUTROPHILS # BLD AUTO: 12.75 K/UL — HIGH (ref 1.8–7.4)
NEUTROPHILS NFR BLD AUTO: 81.3 % — HIGH (ref 43–77)
NRBC # BLD: 0 /100 WBCS — SIGNIFICANT CHANGE UP (ref 0–0)
NRBC # FLD: 0 K/UL — SIGNIFICANT CHANGE UP (ref 0–0)
OSMOLALITY UR: 155 MOSM/KG — SIGNIFICANT CHANGE UP (ref 50–1200)
PHOSPHATE SERPL-MCNC: 3.5 MG/DL — SIGNIFICANT CHANGE UP (ref 2.5–4.5)
PLATELET # BLD AUTO: 256 K/UL — SIGNIFICANT CHANGE UP (ref 150–400)
POTASSIUM SERPL-MCNC: 4.6 MMOL/L — SIGNIFICANT CHANGE UP (ref 3.5–5.3)
POTASSIUM SERPL-SCNC: 4.6 MMOL/L — SIGNIFICANT CHANGE UP (ref 3.5–5.3)
PROT SERPL-MCNC: 6.6 G/DL — SIGNIFICANT CHANGE UP (ref 6–8.3)
RBC # BLD: 4.51 M/UL — SIGNIFICANT CHANGE UP (ref 4.2–5.8)
RBC # FLD: 16.3 % — HIGH (ref 10.3–14.5)
SODIUM SERPL-SCNC: 126 MMOL/L — LOW (ref 135–145)
SODIUM UR-SCNC: <20 MMOL/L — SIGNIFICANT CHANGE UP
[facility] SOURCE: SIGNIFICANT CHANGE UP
[facility] SOURCE: SIGNIFICANT CHANGE UP
WBC # BLD: 15.68 K/UL — HIGH (ref 3.8–10.5)
WBC # FLD AUTO: 15.68 K/UL — HIGH (ref 3.8–10.5)

## 2023-05-07 PROCEDURE — 99222 1ST HOSP IP/OBS MODERATE 55: CPT

## 2023-05-07 PROCEDURE — 99223 1ST HOSP IP/OBS HIGH 75: CPT

## 2023-05-07 PROCEDURE — 12345: CPT | Mod: NC

## 2023-05-07 RX ORDER — HYDROXYZINE HCL 10 MG
50 TABLET ORAL AT BEDTIME
Refills: 0 | Status: DISCONTINUED | OUTPATIENT
Start: 2023-05-07 | End: 2023-05-09

## 2023-05-07 RX ORDER — INSULIN LISPRO 100/ML
VIAL (ML) SUBCUTANEOUS
Refills: 0 | Status: DISCONTINUED | OUTPATIENT
Start: 2023-05-07 | End: 2023-05-09

## 2023-05-07 RX ORDER — DILTIAZEM HCL 120 MG
180 CAPSULE, EXT RELEASE 24 HR ORAL EVERY 24 HOURS
Refills: 0 | Status: DISCONTINUED | OUTPATIENT
Start: 2023-05-07 | End: 2023-05-08

## 2023-05-07 RX ORDER — VANCOMYCIN HCL 1 G
1250 VIAL (EA) INTRAVENOUS EVERY 8 HOURS
Refills: 0 | Status: DISCONTINUED | OUTPATIENT
Start: 2023-05-07 | End: 2023-05-09

## 2023-05-07 RX ORDER — KETOROLAC TROMETHAMINE 30 MG/ML
15 SYRINGE (ML) INJECTION ONCE
Refills: 0 | Status: DISCONTINUED | OUTPATIENT
Start: 2023-05-07 | End: 2023-05-07

## 2023-05-07 RX ORDER — GLUCAGON INJECTION, SOLUTION 0.5 MG/.1ML
1 INJECTION, SOLUTION SUBCUTANEOUS ONCE
Refills: 0 | Status: DISCONTINUED | OUTPATIENT
Start: 2023-05-07 | End: 2023-05-09

## 2023-05-07 RX ORDER — CEFEPIME 1 G/1
2000 INJECTION, POWDER, FOR SOLUTION INTRAMUSCULAR; INTRAVENOUS EVERY 12 HOURS
Refills: 0 | Status: DISCONTINUED | OUTPATIENT
Start: 2023-05-08 | End: 2023-05-08

## 2023-05-07 RX ORDER — BUDESONIDE AND FORMOTEROL FUMARATE DIHYDRATE 160; 4.5 UG/1; UG/1
2 AEROSOL RESPIRATORY (INHALATION)
Refills: 0 | Status: DISCONTINUED | OUTPATIENT
Start: 2023-05-07 | End: 2023-06-06

## 2023-05-07 RX ORDER — ATORVASTATIN CALCIUM 80 MG/1
40 TABLET, FILM COATED ORAL AT BEDTIME
Refills: 0 | Status: DISCONTINUED | OUTPATIENT
Start: 2023-05-07 | End: 2023-06-06

## 2023-05-07 RX ORDER — CHOLECALCIFEROL (VITAMIN D3) 125 MCG
1 CAPSULE ORAL
Qty: 0 | Refills: 0 | DISCHARGE

## 2023-05-07 RX ORDER — CHOLECALCIFEROL (VITAMIN D3) 125 MCG
2000 CAPSULE ORAL DAILY
Refills: 0 | Status: DISCONTINUED | OUTPATIENT
Start: 2023-05-07 | End: 2023-06-06

## 2023-05-07 RX ORDER — ACETAMINOPHEN 500 MG
975 TABLET ORAL EVERY 6 HOURS
Refills: 0 | Status: DISCONTINUED | OUTPATIENT
Start: 2023-05-07 | End: 2023-06-06

## 2023-05-07 RX ORDER — DEXTROSE 50 % IN WATER 50 %
12.5 SYRINGE (ML) INTRAVENOUS ONCE
Refills: 0 | Status: DISCONTINUED | OUTPATIENT
Start: 2023-05-07 | End: 2023-05-09

## 2023-05-07 RX ORDER — UMECLIDINIUM BROMIDE AND VILANTEROL TRIFENATATE 62.5; 25 UG/1; UG/1
1 POWDER RESPIRATORY (INHALATION)
Qty: 0 | Refills: 0 | DISCHARGE

## 2023-05-07 RX ORDER — LIDOCAINE 4 G/100G
1 CREAM TOPICAL ONCE
Refills: 0 | Status: COMPLETED | OUTPATIENT
Start: 2023-05-07 | End: 2023-05-07

## 2023-05-07 RX ORDER — INSULIN LISPRO 100/ML
VIAL (ML) SUBCUTANEOUS AT BEDTIME
Refills: 0 | Status: DISCONTINUED | OUTPATIENT
Start: 2023-05-07 | End: 2023-05-09

## 2023-05-07 RX ORDER — DEXTROSE 50 % IN WATER 50 %
15 SYRINGE (ML) INTRAVENOUS ONCE
Refills: 0 | Status: DISCONTINUED | OUTPATIENT
Start: 2023-05-07 | End: 2023-05-09

## 2023-05-07 RX ORDER — SODIUM CHLORIDE 9 MG/ML
1000 INJECTION, SOLUTION INTRAVENOUS
Refills: 0 | Status: DISCONTINUED | OUTPATIENT
Start: 2023-05-07 | End: 2023-05-09

## 2023-05-07 RX ORDER — ALBUTEROL 90 UG/1
2 AEROSOL, METERED ORAL EVERY 6 HOURS
Refills: 0 | Status: DISCONTINUED | OUTPATIENT
Start: 2023-05-07 | End: 2023-06-06

## 2023-05-07 RX ORDER — OXYCODONE HYDROCHLORIDE 5 MG/1
5 TABLET ORAL EVERY 4 HOURS
Refills: 0 | Status: DISCONTINUED | OUTPATIENT
Start: 2023-05-07 | End: 2023-05-13

## 2023-05-07 RX ORDER — CEFEPIME 1 G/1
2000 INJECTION, POWDER, FOR SOLUTION INTRAMUSCULAR; INTRAVENOUS ONCE
Refills: 0 | Status: COMPLETED | OUTPATIENT
Start: 2023-05-07 | End: 2023-05-07

## 2023-05-07 RX ORDER — ENOXAPARIN SODIUM 100 MG/ML
40 INJECTION SUBCUTANEOUS EVERY 24 HOURS
Refills: 0 | Status: DISCONTINUED | OUTPATIENT
Start: 2023-05-07 | End: 2023-05-09

## 2023-05-07 RX ORDER — LOSARTAN POTASSIUM 100 MG/1
100 TABLET, FILM COATED ORAL DAILY
Refills: 0 | Status: DISCONTINUED | OUTPATIENT
Start: 2023-05-07 | End: 2023-06-06

## 2023-05-07 RX ORDER — CEFEPIME 1 G/1
INJECTION, POWDER, FOR SOLUTION INTRAMUSCULAR; INTRAVENOUS
Refills: 0 | Status: DISCONTINUED | OUTPATIENT
Start: 2023-05-07 | End: 2023-05-08

## 2023-05-07 RX ORDER — ACETAMINOPHEN 500 MG
650 TABLET ORAL EVERY 6 HOURS
Refills: 0 | Status: DISCONTINUED | OUTPATIENT
Start: 2023-05-07 | End: 2023-05-07

## 2023-05-07 RX ORDER — DEXTROSE 50 % IN WATER 50 %
25 SYRINGE (ML) INTRAVENOUS ONCE
Refills: 0 | Status: DISCONTINUED | OUTPATIENT
Start: 2023-05-07 | End: 2023-05-09

## 2023-05-07 RX ORDER — MORPHINE SULFATE 50 MG/1
4 CAPSULE, EXTENDED RELEASE ORAL ONCE
Refills: 0 | Status: DISCONTINUED | OUTPATIENT
Start: 2023-05-07 | End: 2023-05-07

## 2023-05-07 RX ORDER — LOSARTAN POTASSIUM 100 MG/1
75 TABLET, FILM COATED ORAL DAILY
Refills: 0 | Status: DISCONTINUED | OUTPATIENT
Start: 2023-05-07 | End: 2023-05-07

## 2023-05-07 RX ADMIN — Medication 975 MILLIGRAM(S): at 13:35

## 2023-05-07 RX ADMIN — OXYCODONE HYDROCHLORIDE 5 MILLIGRAM(S): 5 TABLET ORAL at 14:08

## 2023-05-07 RX ADMIN — ATORVASTATIN CALCIUM 40 MILLIGRAM(S): 80 TABLET, FILM COATED ORAL at 22:24

## 2023-05-07 RX ADMIN — Medication 180 MILLIGRAM(S): at 03:35

## 2023-05-07 RX ADMIN — Medication 2000 UNIT(S): at 12:31

## 2023-05-07 RX ADMIN — LIDOCAINE 1 PATCH: 4 CREAM TOPICAL at 00:52

## 2023-05-07 RX ADMIN — LIDOCAINE 1 PATCH: 4 CREAM TOPICAL at 06:22

## 2023-05-07 RX ADMIN — Medication 166.67 MILLIGRAM(S): at 06:08

## 2023-05-07 RX ADMIN — BUDESONIDE AND FORMOTEROL FUMARATE DIHYDRATE 2 PUFF(S): 160; 4.5 AEROSOL RESPIRATORY (INHALATION) at 09:18

## 2023-05-07 RX ADMIN — Medication 15 MILLIGRAM(S): at 06:28

## 2023-05-07 RX ADMIN — CEFEPIME 100 MILLIGRAM(S): 1 INJECTION, POWDER, FOR SOLUTION INTRAMUSCULAR; INTRAVENOUS at 12:30

## 2023-05-07 RX ADMIN — OXYCODONE HYDROCHLORIDE 5 MILLIGRAM(S): 5 TABLET ORAL at 14:38

## 2023-05-07 RX ADMIN — ENOXAPARIN SODIUM 40 MILLIGRAM(S): 100 INJECTION SUBCUTANEOUS at 06:13

## 2023-05-07 RX ADMIN — LOSARTAN POTASSIUM 75 MILLIGRAM(S): 100 TABLET, FILM COATED ORAL at 06:08

## 2023-05-07 RX ADMIN — Medication 166.67 MILLIGRAM(S): at 14:07

## 2023-05-07 RX ADMIN — LIDOCAINE 1 PATCH: 4 CREAM TOPICAL at 12:55

## 2023-05-07 RX ADMIN — Medication 975 MILLIGRAM(S): at 13:05

## 2023-05-07 RX ADMIN — OXYCODONE HYDROCHLORIDE 5 MILLIGRAM(S): 5 TABLET ORAL at 21:30

## 2023-05-07 RX ADMIN — OXYCODONE HYDROCHLORIDE 5 MILLIGRAM(S): 5 TABLET ORAL at 21:01

## 2023-05-07 RX ADMIN — Medication 15 MILLIGRAM(S): at 06:13

## 2023-05-07 RX ADMIN — MORPHINE SULFATE 4 MILLIGRAM(S): 50 CAPSULE, EXTENDED RELEASE ORAL at 00:52

## 2023-05-07 RX ADMIN — BUDESONIDE AND FORMOTEROL FUMARATE DIHYDRATE 2 PUFF(S): 160; 4.5 AEROSOL RESPIRATORY (INHALATION) at 21:02

## 2023-05-07 RX ADMIN — Medication 166.67 MILLIGRAM(S): at 22:24

## 2023-05-07 NOTE — H&P ADULT - PROBLEM SELECTOR PLAN 1
-CT pelvis w/ cellulitis, and pus coming out  -gen surgery consulted appreciate recs, get dermatology in am  -vancomycin 1250 mg TID  -f/u blood cultures times 2    #Left rib pain  -on inspiration  -check d-dimer r/o PE, low concern though given not hypoxic on RA  -likely 2/2 bronchiectasis

## 2023-05-07 NOTE — H&P ADULT - NSHPPHYSICALEXAM_GEN_ALL_CORE
PHYSICAL EXAM:  GENERAL: NAD, well-developed  HEAD:  Atraumatic, Normocephalic  EYES: EOMI, PERRLA, conjunctiva and sclera clear  NECK: Supple, No JVD  CHEST/LUNG: rhonchi bilateral lower lung fields; No wheeze  HEART: sinus tachycardic rate and rhythm; No murmurs, rubs, or gallops  ABDOMEN: Soft, Nontender, Nondistended; Bowel sounds present  EXTREMITIES:  2+ Peripheral Pulses, No clubbing, cyanosis, or edema  PSYCH: AAOx3  NEUROLOGY: non-focal  SKIN: No rashes or lesions

## 2023-05-07 NOTE — H&P ADULT - PROBLEM SELECTOR PLAN 5
-given infection, decrease diltiazem to 180 mg ER, can uptitrate as needed.   -decrease losartan from 100 mg to 75 mg    #HLD  -c/w rosuvastatin 10 mg

## 2023-05-07 NOTE — H&P ADULT - PROBLEM SELECTOR PLAN 2
-b/l bronchiectasis chronic  -likely from immune deficiency, f/u monitor if pt's wbc count is worsening or breathing is worse, consider adding on cefepime (cover pseudomonas)  -symbicort BID, proair prn  -encourage incentive spirometry

## 2023-05-07 NOTE — H&P ADULT - NSHPLABSRESULTS_GEN_ALL_CORE
LABS:                         11.9   13.34 )-----------( 274      ( 06 May 2023 14:50 )             35.3         122<L>  |  85<L>  |  5<L>  ----------------------------<  116<H>  4.7   |  22  |  0.67    Ca    8.8      06 May 2023 14:50    TPro  6.9  /  Alb  3.8  /  TBili  0.2  /  DBili  x   /  AST  32  /  ALT  20  /  AlkPhos  163<H>      PT/INR - ( 06 May 2023 14:50 )   PT: 14.7 sec;   INR: 1.26 ratio         PTT - ( 06 May 2023 14:50 )  PTT:30.1 sec  Urinalysis Basic - ( 06 May 2023 21:40 )    Color: Colorless / Appearance: Clear / S.020 / pH: x  Gluc: x / Ketone: Negative  / Bili: Negative / Urobili: <2 mg/dL   Blood: x / Protein: 30 mg/dL / Nitrite: Negative   Leuk Esterase: Negative / RBC: 2 /HPF / WBC 0 /HPF   Sq Epi: x / Non Sq Epi: x / Bacteria: Negative          RADIOLOGY, EKG & ADDITIONAL TESTS: Reviewed.

## 2023-05-07 NOTE — PATIENT PROFILE ADULT - FALL HARM RISK - RISK INTERVENTIONS

## 2023-05-07 NOTE — PROGRESS NOTE ADULT - ATTENDING COMMENTS
Siria Ortiz MD  Medicine Attending  Teams preferred/Pager: [phone]    I have personally seen and examined patient. I discussed the case with Dr. Le and agree with findings and plan as detailed per note above.    57 year old M hx of bipolar, chronic bronchiectasis, schizophrenia, hypogammaglobulinemia (monthly IVIG), common variable immunodeficiency, HTN, who presents to the ER w/ right gluteal wound and erythema with pus as well. He has had multiple skin and soft tissue infections that have been treated with antibiotics with improvement, most recently here end of March. He also has a history of hyponatremia attributed to his psychiatric meds.    Pt reports feeling better. States buttocks pain improved. VSS    Add cefepime although suspect this is likely MRSA. appreciate Surg input. will consult ID as well. will discuss Pt's improvement and if refractory will consult derm but suspect this is infectious

## 2023-05-07 NOTE — PROGRESS NOTE ADULT - SUBJECTIVE AND OBJECTIVE BOX
SURGERY  Pager: #[phone]      INTERVAL EVENTS/SUBJECTIVE: Patient seen and examined at bedside.     ______________________________________________  OBJECTIVE:   T(C): 36.4 (05-07-23 @ 06:10), Max: 37.2 (05-06-23 @ 14:10)  HR: 98 (05-07-23 @ 06:10) (90 - 123)  BP: 147/95 (05-07-23 @ 06:10) (142/77 - 167/99)  RR: 18 (05-07-23 @ 06:10) (18 - 20)  SpO2: 95% (05-07-23 @ 06:10) (88% - 100%)  Wt(kg): --  CAPILLARY BLOOD GLUCOSE      POCT Blood Glucose.: 122 mg/dL (07 May 2023 01:31)    I&O's Detail      Physical exam:  Gen: resting in bed comfortably in NAD  Chest: no increased WOB, regular inspiratory effort   Abdomen: Soft, nontender, nondistended with no rebound tenderness or guarding.  Skin: right buttock wound approximately 5x5cm with areas of fibrinous exudate, surround erythema and induration, off midline  Vascular: SIMON, PEARL x4  Neuro: awake, alert  ______________________________________________  LABS:  CBC Full  -  ( 07 May 2023 05:33 )  WBC Count : 15.68 K/uL  RBC Count : 4.51 M/uL  Hemoglobin : 12.1 g/dL  Hematocrit : 36.6 %  Platelet Count - Automated : 256 K/uL  Mean Cell Volume : 81.2 fL  Mean Cell Hemoglobin : 26.8 pg  Mean Cell Hemoglobin Concentration : 33.1 gm/dL  Auto Neutrophil # : 12.75 K/uL  Auto Lymphocyte # : 0.86 K/uL  Auto Monocyte # : 1.78 K/uL  Auto Eosinophil # : 0.05 K/uL  Auto Basophil # : 0.05 K/uL  Auto Neutrophil % : 81.3 %  Auto Lymphocyte % : 5.5 %  Auto Monocyte % : 11.4 %  Auto Eosinophil % : 0.3 %  Auto Basophil % : 0.3 %    05-07    126<L>  |  88<L>  |  7   ----------------------------<  107<H>  4.6   |  24  |  0.73    Ca    8.9      07 May 2023 05:33  Phos  3.5     05-07  Mg     2.00     05-07    TPro  6.6  /  Alb  3.4  /  TBili  0.2  /  DBili  x   /  AST  15  /  ALT  16  /  AlkPhos  176<H>  05-07    _____________________________________________  RADIOLOGY:

## 2023-05-07 NOTE — CONSULT NOTE ADULT - SUBJECTIVE AND OBJECTIVE BOX
GENERAL SURGERY CONSULT NOTE  --------------------------------------------------------------------------------------------    HPI:   Patient is a 57y old  Male who presents with a chief complaint of gluteal wound of right side    HPI:    57-year-old male with past medical history bipolar, schizophrenia, hypogammaglobulinemia (monthly IVIG), HTN, DM 2, common variable immunodeficiency presenting with 2 weeks of pain/redness/drainage from right sided gluteal wound. He reports was recently at [facility] but left AMA, has been taking clindamycin without much improvement.    He was previously here in 2023 with left sided wound of similar clinical picture. He underwent debridement on 3/17 and then was subsequently seen by wound care and dermatology. Punch biopsy was negative for pyoderma gangrenosum. Etiology of wound was unclear at that time based on chart as patient is ambulatory, unlikely to be pressure-induced. He was instructed to FU with wound care but was lost to follow up because his "phone ".     Patient denies fevers/chills, denies lightheadedness/dizziness, denies SOB/chest pain, denies nausea/vomiting, denies constipation/diarrhea.      ROS: 10-system review is otherwise negative except HPI above.      PAST MEDICAL & SURGICAL HISTORY:  Smoker      DM (diabetes mellitus)      HTN (hypertension)      Abscess of finger      Bipolar disorder      Chronic hyponatremia      CVID (common variable immunodeficiency)      Hyponatremia      Deviated septum      Loss of teeth due to extraction  All teeth due to dental carries        FAMILY HISTORY:  Family history of throat cancer (Father)    Family history of leukemia (Mother)    [] Family history not pertinent as reviewed with the patient and family    SOCIAL HISTORY:    Lives at home, sister is support system  Smokes cigarretes    ALLERGIES: amoxicillin (Fever)  penicillin (Rash)      HOME MEDICATIONS:   Home Medications:  Albuterol (Eqv-ProAir HFA) 90 mcg/inh inhalation aerosol: 2 puff(s) inhaled every 6 hours, As Needed    Last filled 3/2022 (2023 11:37)  Anoro Ellipta 62.5 mcg-25 mcg/inh inhalation powder: 1 puff(s) inhaled once a day    Last filled 6/10/22 but prescription was cancelled (2023 11:37)  dilTIAZem 300 mg/24 hours oral capsule, extended release: 1 cap(s) orally once a day (in the morning) (2023 11:37)  Haldol Decanoate 100 mg/mL intramuscular solution: 150 milligram(s) intramuscularly every 4 weeks Next dose due 2023 (29 Mar 2023 14:29)  HYDROXYZINE HCL 25 MG TABLET: take 2 tablets by mouth at bedtime if needed (2023 11:37)  losartan 100 mg oral tablet: 1 tab(s) orally once a day (29 Mar 2023 14:09)  rosuvastatin 10 mg oral tablet: 1 tab(s) orally once a day (Last dispensed 12/2022 x 90 day).  (2023 11:37)  vancomycin 1.5 g intravenous injection: 1.5 gram(s) intravenous every 12 hours (29 Mar 2023 15:05)  Vitamin D3 50 mcg (2000 intl units) oral tablet: 1 tab(s) orally once a day (2023 11:37)      CURRENT MEDICATIONS  MEDICATIONS (STANDING):   MEDICATIONS (PRN):  --------------------------------------------------------------------------------------------    Vitals:   T(C): 37.1 (23 @ 23:36), Max: 37.2 (23 @ 14:10)  HR: 123 (23 @ 23:36) (90 - 123)  BP: 154/79 (23 @ 23:36) (142/77 - 154/79)  RR: 18 (23 @ 23:36) (18 - 18)  SpO2: 98% (23 @ 23:36) (93% - 100%)  CAPILLARY BLOOD GLUCOSE          Height (cm): 188 ( 14:10)  Weight (kg): 135 (:03)  BMI (kg/m2): 38.2 (:03)  BSA (m2): 2.57 (:)    PHYSICAL EXAM:   General: NAD, Lying in bed comfortably  Neuro: A+Ox3  Cardio: regular rate  Resp: Good effort  Skin: right buttock wound approximately 5x5cm with areas of fibrinous exudate and punctate areas of purulence that is easily expressed, surround erythema and induration, off midline  --------------------------------------------------------------------------------------------    LABS  CBC ( 14:50)                              11.9<L>                         13.34<H>  )----------------(  274        73.7  % Neutrophils, 9.7<L>% Lymphocytes, ANC: 10.18<H>                              35.3<L>    BMP ( 14:50)             122<L>  |  85<L>   |  5<L>  		Ca++ --      Ca 8.8                ---------------------------------( 116<H>		Mg --                 4.7     |  22      |  0.67  			Ph --        LFTs ( 14:50)      TPro 6.9 / Alb 3.8 / TBili 0.2 / DBili -- / AST 32 / ALT 20 / AlkPhos 163<H>    Coags ( 14:50)  aPTT 30.1 / INR 1.26<H> / PT 14.7<H>        VBG ( 14:50)     7.43 / 37<L> / 115<H> / 25 / 0.5 / 98.7<H>%     Lactate: 1.7    --------------------------------------------------------------------------------------------    MICROBIOLOGY  Urinalysis ( @ 21:40):     Color: Colorless / Appearance: Clear / S.020 / pH: 7.0 / Gluc: Negative / Ketones: Negative / Bili: Negative / Urobili: <2 mg/dL / Protein :30 mg/dL<!> / Nitrites: Negative / Leuk.Est: Negative / RBC: 2 / WBC: 0 / Sq Epi:  / Non Sq Epi:  / Bacteria Negative         --------------------------------------------------------------------------------------------    IMAGING    < from: CT Pelvis w/ IV Cont (23 @ 21:22) >  IMPRESSION:  Moderate subcutaneous inflammatory change and skin thickening in the   right buttocks suggestive of a cellulitis. No associated rim-enhancing   fluid collection to suggest an abscess. No subcutaneous gas.    < end of copied text >        --------------------------------------------------------------------------------------------     GENERAL SURGERY CONSULT NOTE  --------------------------------------------------------------------------------------------    HPI:   Patient is a 57y old  Male who presents with a chief complaint of gluteal wound of right side    HPI:    57-year-old male with past medical history bipolar, schizophrenia, hypogammaglobulinemia (monthly IVIG), HTN, DM 2, common variable immunodeficiency presenting with 2 weeks of pain/redness/drainage from right sided gluteal wound. He reports was recently at [facility] but left AMA, has been taking clindamycin without much improvement.    He was previously here in 2023 with left sided wound of similar clinical picture. He underwent debridement on 3/17 and then was subsequently seen by wound care and dermatology. Punch biopsy was negative for pyoderma gangrenosum. Etiology of wound was unclear at that time based on chart as patient is ambulatory, unlikely to be pressure-induced. He was instructed to FU with wound care but was lost to follow up because his "phone ".     Patient denies fevers/chills, denies lightheadedness/dizziness, denies SOB/chest pain, denies nausea/vomiting, denies constipation/diarrhea.      ROS: 10-system review is otherwise negative except HPI above.      PAST MEDICAL & SURGICAL HISTORY:  Smoker      DM (diabetes mellitus)      HTN (hypertension)      Abscess of finger      Bipolar disorder      Chronic hyponatremia      CVID (common variable immunodeficiency)      Hyponatremia      Deviated septum      Loss of teeth due to extraction  All teeth due to dental carries        FAMILY HISTORY:  Family history of throat cancer (Father)    Family history of leukemia (Mother)      SOCIAL HISTORY:    Lives at home, sister is support system  Smokes cigarettes    ALLERGIES: amoxicillin (Fever)  penicillin (Rash)      HOME MEDICATIONS:   Home Medications:  Albuterol (Eqv-ProAir HFA) 90 mcg/inh inhalation aerosol: 2 puff(s) inhaled every 6 hours, As Needed    Last filled 3/2022 (2023 11:37)  Anoro Ellipta 62.5 mcg-25 mcg/inh inhalation powder: 1 puff(s) inhaled once a day    Last filled 6/10/22 but prescription was cancelled (2023 11:37)  dilTIAZem 300 mg/24 hours oral capsule, extended release: 1 cap(s) orally once a day (in the morning) (2023 11:37)  Haldol Decanoate 100 mg/mL intramuscular solution: 150 milligram(s) intramuscularly every 4 weeks Next dose due 2023 (29 Mar 2023 14:29)  HYDROXYZINE HCL 25 MG TABLET: take 2 tablets by mouth at bedtime if needed (2023 11:37)  losartan 100 mg oral tablet: 1 tab(s) orally once a day (29 Mar 2023 14:09)  rosuvastatin 10 mg oral tablet: 1 tab(s) orally once a day (Last dispensed 12/2022 x 90 day).  (2023 11:37)  vancomycin 1.5 g intravenous injection: 1.5 gram(s) intravenous every 12 hours (29 Mar 2023 15:05)  Vitamin D3 50 mcg (2000 intl units) oral tablet: 1 tab(s) orally once a day (2023 11:37)      CURRENT MEDICATIONS  MEDICATIONS (STANDING):   MEDICATIONS (PRN):  --------------------------------------------------------------------------------------------    Vitals:   T(C): 37.1 (23 @ 23:36), Max: 37.2 (23 @ 14:10)  HR: 123 (23 @ 23:36) (90 - 123)  BP: 154/79 (23 @ 23:36) (142/77 - 154/79)  RR: 18 (23 @ 23:36) (18 - 18)  SpO2: 98% (23 @ 23:36) (93% - 100%)  CAPILLARY BLOOD GLUCOSE          Height (cm): 188 ( @ 14:10)  Weight (kg): 135 (:03)  BMI (kg/m2): 38.2 (:03)  BSA (m2): 2.57 (:)    PHYSICAL EXAM:   General: NAD, Lying in bed comfortably  Neuro: A+Ox3  Cardio: regular rate  Resp: Good effort  Skin: right buttock wound approximately 5x5cm with areas of fibrinous exudate and punctate areas of purulence that is easily expressed, surround erythema and induration, off midline  --------------------------------------------------------------------------------------------    LABS  CBC ( 14:50)                              11.9<L>                         13.34<H>  )----------------(  274        73.7  % Neutrophils, 9.7<L>% Lymphocytes, ANC: 10.18<H>                              35.3<L>    BMP ( 14:50)             122<L>  |  85<L>   |  5<L>  		Ca++ --      Ca 8.8                ---------------------------------( 116<H>		Mg --                 4.7     |  22      |  0.67  			Ph --        LFTs ( 14:50)      TPro 6.9 / Alb 3.8 / TBili 0.2 / DBili -- / AST 32 / ALT 20 / AlkPhos 163<H>    Coags ( @ 14:50)  aPTT 30.1 / INR 1.26<H> / PT 14.7<H>        VBG ( 14:50)     7.43 / 37<L> / 115<H> / 25 / 0.5 / 98.7<H>%     Lactate: 1.7    --------------------------------------------------------------------------------------------    MICROBIOLOGY  Urinalysis ( @ 21:40):     Color: Colorless / Appearance: Clear / S.020 / pH: 7.0 / Gluc: Negative / Ketones: Negative / Bili: Negative / Urobili: <2 mg/dL / Protein :30 mg/dL<!> / Nitrites: Negative / Leuk.Est: Negative / RBC: 2 / WBC: 0 / Sq Epi:  / Non Sq Epi:  / Bacteria Negative         --------------------------------------------------------------------------------------------    IMAGING    < from: CT Pelvis w/ IV Cont (23 @ 21:22) >  IMPRESSION:  Moderate subcutaneous inflammatory change and skin thickening in the   right buttocks suggestive of a cellulitis. No associated rim-enhancing   fluid collection to suggest an abscess. No subcutaneous gas.    < end of copied text >        --------------------------------------------------------------------------------------------

## 2023-05-07 NOTE — H&P ADULT - HISTORY OF PRESENT ILLNESS
Patient is a 57 year old M hx of bipolar, chronic bronchiectasis, schizophrenia, hypogammaglobulinemia (monthly IVIG), common variable immunodeficiency, HTN, who presents to the ER w/ right gluteal wound and erythema with pus as well. He was hospitalized recently for MRSA infection and s/p vancomycin extended duration. Patient left AMA from [facility] 2 days ago and was on clindamycin with worsening symptoms. He denies fevers, chills, nausea, vomiting, diarrhea, sob, headaches, visual changes, LE swelling, dysuria, polyuria, melena or hematochezia. Has left rib pain on inspiration.    ED course: CT pelvis w/ iv contrast noted buttocks cellulitis, chronic bronchiectasis noted, leukocytosis to 13k, and hyponatremia 122.

## 2023-05-07 NOTE — PROGRESS NOTE ADULT - SUBJECTIVE AND OBJECTIVE BOX
Medicine Progress Note  --------------------  Josr Le M.D.  Internal Medicine/Emergency Medicine  PGY-2  --------------------      Patient: DAT WOOD, MRN: [id number], : 1965  Admitted on 23 for Cellulitis of buttock      LANGUAGE - English ]OR[ PI (_): #                ------------------------------------------------------------------------------------------------------------  SUMMARY (from last progress note through 23 @ 07:36):   -  ------------------------------------------------------------------------------------------------------------  OVERNIGHT EVENTS  NAEON    SUBJECTIVE  -       ROS negative unless noted above.  ------------------------------------------------------------------------------------------------------------  OBJECTIVE:  Physical Exam  CONST:     NAD, well-appearing; well-developed; appears stated age  EYES:         Conjunctiva clear; PERRL; no conjunctival pallor; no lid lag  ENMT:       MMM, no pharyngeal injection or exudates; normal dentition/dentures present  NECK:        Supple, no LAD; no palpable masses; no thyromegaly  RESP :        Normal respiratory effort; CTA bilaterally; no W/R/R  CHEST:       No TTP, no lines/ports; symmetric chest expansion  CARDIO:     RRR, normal S1 and S2, no M/R/G; apical impulse at MCL  VASC:         No JVD/AJR, no peripheral edema, pulses 2+ B/L, Cap refill <2s  ABD:           Soft, NT/ND, norm bowel sounds; no R/G; No HSM; No CVAT  MSK:          No clubbing of digits; no joint swelling or TTP  EXT:           WWP, no reduction in body hair, no LE skin changes  PSYCH        A&O to person, place, and time; affect appropriate  NEURO:     Non-focal; no gross sensory deficits; moving all extremities   SKIN:          No rashes; no palpable lesions; axillae not dry    Vital Signs Last 24 Hrs  T(F): 98.6, Max: 99 (23 @ 14:10)  HR: 122 (90 - 123)  BP: 167/99 (142/77 - 167/99)  RR: 20 (18 - 20)  SpO2: 88% (88% - 100%)        DAILY MEASUREMENTS:  I&O's Summary    Daily Height in cm: 187.96 (07 May 2023 02:00)    Daily   Weight (kg): 131.6 (23 @ 02:00)  Orthostatic VS        LABS:                        11.9   13.34 )-----------( 274      ( 06 May 2023 14:50 )             35.3     Hgb Trend: 11.9<--      122<L>  |  85<L>  |  5<L>  ----------------------------<  116<H>  4.7   |  22  |  0.67    Ca    8.8      06 May 2023 14:50    TPro  6.9  /  Alb  3.8  /  TBili  0.2  /  DBili  x   /  AST  32  /  ALT  20  /  AlkPhos  163<H>      PT/INR - ( 06 May 2023 14:50 )   PT: 14.7 sec;   INR: 1.26 ratio         PTT - ( 06 May 2023 14:50 )  PTT:30.1 sec  CAPILLARY BLOOD GLUCOSE      POCT Blood Glucose.: 122 mg/dL (07 May 2023 01:31)        Urinalysis Basic - ( 06 May 2023 21:40 )    Color: Colorless / Appearance: Clear / S.020 / pH: x  Gluc: x / Ketone: Negative  / Bili: Negative / Urobili: <2 mg/dL   Blood: x / Protein: 30 mg/dL / Nitrite: Negative   Leuk Esterase: Negative / RBC: 2 /HPF / WBC 0 /HPF   Sq Epi: x / Non Sq Epi: x / Bacteria: Negative          Venous Blood Gas:  23 @ 14:50  7.43/37/115/25/98.7  VBG Lactate: 1.7      RADIOLOGY & ADDITIONAL TESTS:  Results Reviewed:     Imaging Reviewed:  Electrocardiogram Reviewed:      ------------------------------------------------------------------------------------------------------------  MEDICATIONS  (STANDING):  atorvastatin 40 milliGRAM(s) Oral at bedtime  budesonide 160 MICROgram(s)/formoterol 4.5 MICROgram(s) Inhaler 2 Puff(s) Inhalation two times a day  cholecalciferol 2000 Unit(s) Oral daily  diltiazem    milliGRAM(s) Oral every 24 hours  enoxaparin Injectable 40 milliGRAM(s) SubCutaneous every 24 hours  losartan 75 milliGRAM(s) Oral daily  vancomycin  IVPB 1250 milliGRAM(s) IV Intermittent every 8 hours    MEDICATIONS  (PRN):  acetaminophen     Tablet .. 650 milliGRAM(s) Oral every 6 hours PRN Temp greater or equal to 38C (100.4F), Mild Pain (1 - 3)  albuterol    90 MICROgram(s) HFA Inhaler 2 Puff(s) Inhalation every 6 hours PRN Shortness of Breath and/or Wheezing  hydrOXYzine hydrochloride 50 milliGRAM(s) Oral at bedtime PRN Anxiety  oxyCODONE    IR 5 milliGRAM(s) Oral every 4 hours PRN Moderate Pain (4 - 6)    ------------------------------------------------------------------------------------------------------------  COORDINATION OF CARE:  Care discussed with consultants/other providers and notes reviewed [Y]     Medicine Progress Note  --------------------  Josr Le M.D.  Internal Medicine/Emergency Medicine  PGY-2  --------------------      Patient: DAT WOOD, MRN: [id number], : 1965  Admitted on 23 for Cellulitis of buttock    OVERNIGHT EVENTS  NAEON    SUBJECTIVE  Mr. Wood explains that he       ROS negative unless noted above.  ------------------------------------------------------------------------------------------------------------  OBJECTIVE:  Physical Exam  GEN: Awake, AOx3, NAD.  HEENT: NCAT  CARDIO: RRR.   RESP: CTA R; rhoncherous bs/ls L  ABD: Soft, NTND.   MSK: No obvious deformity or ROM deficit.   SKIN: Warm, dry.   - R glute with 8-10cm circular wound with purulence and surrounding erythema; no subcutaneous crepitus   NEURO: Moves all four extremities spontaneously  PSYCH: Appropriate mood & relatively flat affect; no AH/VH; no SI/HI      Vital Signs Last 24 Hrs  T(F): 98.6, Max: 99 (23 @ 14:10)  HR: 122 (90 - 123)  BP: 167/99 (142/77 - 167/99)  RR: 20 (18 - 20)  SpO2: 88% (88% - 100%)        DAILY MEASUREMENTS:  I&O's Summary    Daily Height in cm: 187.96 (07 May 2023 02:00)    Daily   Weight (kg): 131.6 (23 @ 02:00)  Orthostatic VS        LABS:                        11.9   13.34 )-----------( 274      ( 06 May 2023 14:50 )             35.3     Hgb Trend: 11.9<--      122<L>  |  85<L>  |  5<L>  ----------------------------<  116<H>  4.7   |  22  |  0.67    Ca    8.8      06 May 2023 14:50    TPro  6.9  /  Alb  3.8  /  TBili  0.2  /  DBili  x   /  AST  32  /  ALT  20  /  AlkPhos  163<H>      PT/INR - ( 06 May 2023 14:50 )   PT: 14.7 sec;   INR: 1.26 ratio         PTT - ( 06 May 2023 14:50 )  PTT:30.1 sec  CAPILLARY BLOOD GLUCOSE      POCT Blood Glucose.: 122 mg/dL (07 May 2023 01:31)        Urinalysis Basic - ( 06 May 2023 21:40 )    Color: Colorless / Appearance: Clear / S.020 / pH: x  Gluc: x / Ketone: Negative  / Bili: Negative / Urobili: <2 mg/dL   Blood: x / Protein: 30 mg/dL / Nitrite: Negative   Leuk Esterase: Negative / RBC: 2 /HPF / WBC 0 /HPF   Sq Epi: x / Non Sq Epi: x / Bacteria: Negative          Venous Blood Gas:  23 @ 14:50  7.43/37/115/25/98.7  VBG Lactate: 1.7      RADIOLOGY & ADDITIONAL TESTS:  Results Reviewed:     Imaging Reviewed:  Electrocardiogram Reviewed:      ------------------------------------------------------------------------------------------------------------  MEDICATIONS  (STANDING):  atorvastatin 40 milliGRAM(s) Oral at bedtime  budesonide 160 MICROgram(s)/formoterol 4.5 MICROgram(s) Inhaler 2 Puff(s) Inhalation two times a day  cholecalciferol 2000 Unit(s) Oral daily  diltiazem    milliGRAM(s) Oral every 24 hours  enoxaparin Injectable 40 milliGRAM(s) SubCutaneous every 24 hours  losartan 75 milliGRAM(s) Oral daily  vancomycin  IVPB 1250 milliGRAM(s) IV Intermittent every 8 hours    MEDICATIONS  (PRN):  acetaminophen     Tablet .. 650 milliGRAM(s) Oral every 6 hours PRN Temp greater or equal to 38C (100.4F), Mild Pain (1 - 3)  albuterol    90 MICROgram(s) HFA Inhaler 2 Puff(s) Inhalation every 6 hours PRN Shortness of Breath and/or Wheezing  hydrOXYzine hydrochloride 50 milliGRAM(s) Oral at bedtime PRN Anxiety  oxyCODONE    IR 5 milliGRAM(s) Oral every 4 hours PRN Moderate Pain (4 - 6)    ------------------------------------------------------------------------------------------------------------  COORDINATION OF CARE:  Care discussed with consultants/other providers and notes reviewed [Y]

## 2023-05-07 NOTE — CONSULT NOTE ADULT - ASSESSMENT
ASSESSMENT:   57-year-old male with past medical history bipolar, schizophrenia, hypogammaglobulinemia (monthly IVIG), HTN, DM 2, common variable immunodeficiency presenting with 2 weeks of pain/redness/drainage from right sided gluteal wound. He reports was recently at [facility] but left AMA, has been taking clindamycin without much improvement. Surgery consulted to evaluate, previously involved with debridement of left gluteal wound.    PLAN:    - Etiology of this wound and prior wound is unclear  - Patient is non toxic appearing and does not require urgent debridement  - Agree with abx per medicine  - Recommend reengaging dermatology and wound care to help determine the etiology of this wound before pursuing surgical intervention  - Will follow    Discussed with Dr. Hi Jimenezis  General Surgery  B Team  [id number]

## 2023-05-07 NOTE — H&P ADULT - ASSESSMENT
Patient is a 57 year old M hx of bipolar, chronic bronchiectasis, schizophrenia, hypogammaglobulinemia (monthly IVIG), common variable immunodeficiency, HTN, who presents to the ER w/ right gluteal wound and erythema with pus as well.

## 2023-05-07 NOTE — PROGRESS NOTE ADULT - ASSESSMENT
SYNTHESIS  Mr. Cristina is a 57M with h/o Schizoaffective D/O (Bipolar Type), CVID/Hypogammaglobulinemia (monthly IVIG - last 4/13), HTN, T2DM (A1c 6.3% 02/2023; metformin) who presents with sepsis as evidenced by tachycardia, leukocytosis, ico severe right gluteal wound c/f MRSA vs polymicrobial infection given h/o MRSA & CVID.     ASSESSMENT/PLAN  =====Neuro/Psych=====  #Schizoaffective D/O  Historically diagnosed. Pt confirms schizophrenia but denies being on any active psychotropic medications. Denies AH/VH. Denies SI/HI. Does not specify why he left AMA from other hospital, but does understand that he needs help here. historically lacked capacity to leave AMA, but not trying at this time.  - No need for 1:1 at this time  - Psychiatry/ Consultation 5/8 to reconnect pt with  services & optimize inpatient stay  - If pt tries to leave AMA, will need to assess capacity (incl. judgement/understanding of risk/benefit to incomplete treatment)  - C/W Hydroxyzine 50mg PO QHS PRN (anxiety)    #Analgesia  - C/W Acetaminophen 975mg PO Q6H PRN  - spot dose breakthrough medications PRN    =====Respiratory=====  #H/O Bronchiectasis  Pt with known bronchiectasis based on CT chest in 2017. Pt denies having an OP Pulmonologist.     =====Cardiovascular=====  #HTN #HLD  - C/W Home Atorvastatin 40mg PO QHS  - C/W Home Losartan 75mg PO QD  - C/W Home Diltiazem ER 300mg PO QD    =====Gastrointestinal=====  #ARIE    =====Renal/=====  #Hyponatremia  Pt with h/o variable hyponatremia. SOsm calc c/w hypotonic hyponatremia. Farrukh low suggestive of sodium-avid state (i.e. RAAS activation), UOsm quite low, though c/f psychogenic polydipsia vs poor solute intake.. Unclear contribution of ARB as OP as pt not filling medications consistently.   - Trend Farrukh, UOsm    =====MSK/Skin=====  #ARIE (see cellulitis)    =====Infectious Disease=====  #Cellulitis c/b Sepsis  Pt with significant right gluteal wound progressive x1 weeks. P/w tachycardia, leukocytsosis c/w sepsis. Has h/o MRSA infection, requiring long course of vancomycin in the past. Historical Derm Bx negative for PG, but if persistent with poor wound healing, may reconsider further evaluation.  - F/U BCx, UCx  - C/W Vancomycin (5/7-)  - START Cefepime 2g Q12H (5/7-)  - ID C/S Monday (5/8)    =====Endocrine=====  #T2DM  A1c 6.3% 02/2023. Will reassess her, may be contributing to poor wound healing. Pt hasn't picked up medications in quite some day.  - HOLDing Home Metformin 500mg PO BID  - A1c check  - PAUL    =====Heme/Onc=====  #CVID  PT with h/o CVID requiring IgG infusions q1mo (last 4/13/2023).  - OP Immunologist: Dr. Mitchell Boxer ([facility])  - Immunology C/S Monday (5/8)    =====Hospital Bundle=====  Fluids: PO ad margot  Electrolytes: Replete K > 4, Mg > 2, Phos > 3  Nutrition: Diet CC (no DASH d/t hyponatremia)  PPX  ---VTE: LVX  ---GI: Diet  ---Resp: IS  Access: PIV  Code Status: FULL CODE  Dispo: Pending medical stabilization, SUSEI coronado

## 2023-05-07 NOTE — PROGRESS NOTE ADULT - ASSESSMENT
57-year-old male with past medical history bipolar, schizophrenia, hypogammaglobulinemia (monthly IVIG), HTN, DM 2, common variable immunodeficiency presenting with 2 weeks of pain/redness/drainage from right sided gluteal wound. He reports was recently at [facility] but left AMA, has been taking clindamycin without much improvement. Surgery consulted to evaluate, previously involved with debridement of left gluteal wound.    PLAN:    - Etiology of this wound and prior wound is unclear; recommend dermatology vs. immunology? vs. rheum? for evaluation and optimization of comorbidities i.e. CVID prior to surgical intervention. Concern for impaired wound healing and necessity of continued debridement if due to underlying unresolved problem   - Agree with abx per medicine    B Team  [id number]

## 2023-05-07 NOTE — H&P ADULT - PROBLEM SELECTOR PLAN 3
-Na 122, recheck after 1 L  -prior dx possible SIADH, but given acute infection likely component of hypovolemia.

## 2023-05-08 ENCOUNTER — APPOINTMENT (OUTPATIENT)
Dept: DERMATOLOGY | Facility: CLINIC | Age: 58
End: 2023-05-08

## 2023-05-08 DIAGNOSIS — E78.5 HYPERLIPIDEMIA, UNSPECIFIED: ICD-10-CM

## 2023-05-08 DIAGNOSIS — E11.9 TYPE 2 DIABETES MELLITUS WITHOUT COMPLICATIONS: ICD-10-CM

## 2023-05-08 LAB
24R-OH-CALCIDIOL SERPL-MCNC: 30.3 NG/ML — SIGNIFICANT CHANGE UP (ref 30–80)
A1C WITH ESTIMATED AVERAGE GLUCOSE RESULT: 6.3 % — HIGH (ref 4–5.6)
ALBUMIN SERPL ELPH-MCNC: 3.4 G/DL — SIGNIFICANT CHANGE UP (ref 3.3–5)
ALP SERPL-CCNC: 194 U/L — HIGH (ref 40–120)
ALT FLD-CCNC: 25 U/L — SIGNIFICANT CHANGE UP (ref 4–41)
ANION GAP SERPL CALC-SCNC: 11 MMOL/L — SIGNIFICANT CHANGE UP (ref 7–14)
AST SERPL-CCNC: 24 U/L — SIGNIFICANT CHANGE UP (ref 4–40)
BASOPHILS # BLD AUTO: 0.04 K/UL — SIGNIFICANT CHANGE UP (ref 0–0.2)
BASOPHILS NFR BLD AUTO: 0.4 % — SIGNIFICANT CHANGE UP (ref 0–2)
BILIRUB SERPL-MCNC: 0.2 MG/DL — SIGNIFICANT CHANGE UP (ref 0.2–1.2)
BUN SERPL-MCNC: 7 MG/DL — SIGNIFICANT CHANGE UP (ref 7–23)
CALCIUM SERPL-MCNC: 9.1 MG/DL — SIGNIFICANT CHANGE UP (ref 8.4–10.5)
CHLORIDE SERPL-SCNC: 88 MMOL/L — LOW (ref 98–107)
CO2 SERPL-SCNC: 28 MMOL/L — SIGNIFICANT CHANGE UP (ref 22–31)
CORTIS AM PEAK SERPL-MCNC: 16.8 UG/DL — SIGNIFICANT CHANGE UP (ref 6–18.4)
CREAT SERPL-MCNC: 0.69 MG/DL — SIGNIFICANT CHANGE UP (ref 0.5–1.3)
EGFR: 109 ML/MIN/1.73M2 — SIGNIFICANT CHANGE UP
EOSINOPHIL # BLD AUTO: 0.24 K/UL — SIGNIFICANT CHANGE UP (ref 0–0.5)
EOSINOPHIL NFR BLD AUTO: 2.2 % — SIGNIFICANT CHANGE UP (ref 0–6)
ESTIMATED AVERAGE GLUCOSE: 134 — SIGNIFICANT CHANGE UP
GLUCOSE BLDC GLUCOMTR-MCNC: 117 MG/DL — HIGH (ref 70–99)
GLUCOSE BLDC GLUCOMTR-MCNC: 127 MG/DL — HIGH (ref 70–99)
GLUCOSE BLDC GLUCOMTR-MCNC: 130 MG/DL — HIGH (ref 70–99)
GLUCOSE BLDC GLUCOMTR-MCNC: 132 MG/DL — HIGH (ref 70–99)
GLUCOSE BLDC GLUCOMTR-MCNC: 146 MG/DL — HIGH (ref 70–99)
GLUCOSE SERPL-MCNC: 104 MG/DL — HIGH (ref 70–99)
HCT VFR BLD CALC: 35.1 % — LOW (ref 39–50)
HGB BLD-MCNC: 11.7 G/DL — LOW (ref 13–17)
IANC: 8.08 K/UL — HIGH (ref 1.8–7.4)
IGA FLD-MCNC: <2 MG/DL — LOW (ref 84–499)
IGG FLD-MCNC: 489 MG/DL — LOW (ref 610–1660)
IGM SERPL-MCNC: 23 MG/DL — LOW (ref 35–242)
IMM GRANULOCYTES NFR BLD AUTO: 2.4 % — HIGH (ref 0–0.9)
KAPPA LC SER QL IFE: 0.12 MG/DL — LOW (ref 0.33–1.94)
KAPPA/LAMBDA FREE LIGHT CHAIN RATIO, SERUM: 0.71 RATIO — SIGNIFICANT CHANGE UP (ref 0.26–1.65)
LAMBDA LC SER QL IFE: 0.17 MG/DL — LOW (ref 0.57–2.63)
LYMPHOCYTES # BLD AUTO: 1.12 K/UL — SIGNIFICANT CHANGE UP (ref 1–3.3)
LYMPHOCYTES # BLD AUTO: 10.2 % — LOW (ref 13–44)
MAGNESIUM SERPL-MCNC: 2.2 MG/DL — SIGNIFICANT CHANGE UP (ref 1.6–2.6)
MCHC RBC-ENTMCNC: 26.3 PG — LOW (ref 27–34)
MCHC RBC-ENTMCNC: 33.3 GM/DL — SIGNIFICANT CHANGE UP (ref 32–36)
MCV RBC AUTO: 78.9 FL — LOW (ref 80–100)
MONOCYTES # BLD AUTO: 1.24 K/UL — HIGH (ref 0–0.9)
MONOCYTES NFR BLD AUTO: 11.3 % — SIGNIFICANT CHANGE UP (ref 2–14)
NEUTROPHILS # BLD AUTO: 8.08 K/UL — HIGH (ref 1.8–7.4)
NEUTROPHILS NFR BLD AUTO: 73.5 % — SIGNIFICANT CHANGE UP (ref 43–77)
NRBC # BLD: 0 /100 WBCS — SIGNIFICANT CHANGE UP (ref 0–0)
NRBC # FLD: 0 K/UL — SIGNIFICANT CHANGE UP (ref 0–0)
OSMOLALITY SERPL: 265 MOSM/KG — LOW (ref 275–295)
PHOSPHATE SERPL-MCNC: 3.7 MG/DL — SIGNIFICANT CHANGE UP (ref 2.5–4.5)
PLATELET # BLD AUTO: 275 K/UL — SIGNIFICANT CHANGE UP (ref 150–400)
POTASSIUM SERPL-MCNC: 4.3 MMOL/L — SIGNIFICANT CHANGE UP (ref 3.5–5.3)
POTASSIUM SERPL-SCNC: 4.3 MMOL/L — SIGNIFICANT CHANGE UP (ref 3.5–5.3)
PROT SERPL-MCNC: 6.6 G/DL — SIGNIFICANT CHANGE UP (ref 6–8.3)
RBC # BLD: 4.45 M/UL — SIGNIFICANT CHANGE UP (ref 4.2–5.8)
RBC # FLD: 16 % — HIGH (ref 10.3–14.5)
SODIUM SERPL-SCNC: 127 MMOL/L — LOW (ref 135–145)
SODIUM UR-SCNC: 24 MMOL/L — SIGNIFICANT CHANGE UP
VANCOMYCIN TROUGH SERPL-MCNC: 4.1 UG/ML — LOW (ref 10–20)
WBC # BLD: 10.98 K/UL — HIGH (ref 3.8–10.5)
WBC # FLD AUTO: 10.98 K/UL — HIGH (ref 3.8–10.5)

## 2023-05-08 PROCEDURE — 99223 1ST HOSP IP/OBS HIGH 75: CPT | Mod: GC

## 2023-05-08 PROCEDURE — 99223 1ST HOSP IP/OBS HIGH 75: CPT

## 2023-05-08 PROCEDURE — 93306 TTE W/DOPPLER COMPLETE: CPT | Mod: 26

## 2023-05-08 PROCEDURE — 99233 SBSQ HOSP IP/OBS HIGH 50: CPT | Mod: GC

## 2023-05-08 PROCEDURE — 99449 NTRPROF PH1/NTRNET/EHR 31/>: CPT | Mod: GC

## 2023-05-08 RX ORDER — DIPHENHYDRAMINE HCL 50 MG
50 CAPSULE ORAL ONCE
Refills: 0 | Status: COMPLETED | OUTPATIENT
Start: 2023-05-08 | End: 2023-05-08

## 2023-05-08 RX ORDER — DILTIAZEM HCL 120 MG
300 CAPSULE, EXT RELEASE 24 HR ORAL DAILY
Refills: 0 | Status: DISCONTINUED | OUTPATIENT
Start: 2023-05-08 | End: 2023-06-06

## 2023-05-08 RX ORDER — ACETAMINOPHEN 500 MG
650 TABLET ORAL ONCE
Refills: 0 | Status: COMPLETED | OUTPATIENT
Start: 2023-05-08 | End: 2023-05-08

## 2023-05-08 RX ORDER — IMMUNE GLOBULIN (HUMAN) 10 G/100ML
75 INJECTION INTRAVENOUS; SUBCUTANEOUS ONCE
Refills: 0 | Status: COMPLETED | OUTPATIENT
Start: 2023-05-08 | End: 2023-05-08

## 2023-05-08 RX ORDER — OLANZAPINE 15 MG/1
5 TABLET, FILM COATED ORAL EVERY 6 HOURS
Refills: 0 | Status: DISCONTINUED | OUTPATIENT
Start: 2023-05-08 | End: 2023-06-06

## 2023-05-08 RX ADMIN — Medication 180 MILLIGRAM(S): at 02:14

## 2023-05-08 RX ADMIN — Medication 166.67 MILLIGRAM(S): at 14:03

## 2023-05-08 RX ADMIN — Medication 166.67 MILLIGRAM(S): at 21:25

## 2023-05-08 RX ADMIN — CEFEPIME 100 MILLIGRAM(S): 1 INJECTION, POWDER, FOR SOLUTION INTRAMUSCULAR; INTRAVENOUS at 05:45

## 2023-05-08 RX ADMIN — BUDESONIDE AND FORMOTEROL FUMARATE DIHYDRATE 2 PUFF(S): 160; 4.5 AEROSOL RESPIRATORY (INHALATION) at 09:09

## 2023-05-08 RX ADMIN — IMMUNE GLOBULIN (HUMAN) 125 GRAM(S): 10 INJECTION INTRAVENOUS; SUBCUTANEOUS at 17:38

## 2023-05-08 RX ADMIN — Medication 2000 UNIT(S): at 12:47

## 2023-05-08 RX ADMIN — ATORVASTATIN CALCIUM 40 MILLIGRAM(S): 80 TABLET, FILM COATED ORAL at 21:25

## 2023-05-08 RX ADMIN — ENOXAPARIN SODIUM 40 MILLIGRAM(S): 100 INJECTION SUBCUTANEOUS at 05:45

## 2023-05-08 RX ADMIN — OXYCODONE HYDROCHLORIDE 5 MILLIGRAM(S): 5 TABLET ORAL at 11:07

## 2023-05-08 RX ADMIN — LOSARTAN POTASSIUM 100 MILLIGRAM(S): 100 TABLET, FILM COATED ORAL at 05:45

## 2023-05-08 RX ADMIN — Medication 50 MILLIGRAM(S): at 17:06

## 2023-05-08 RX ADMIN — Medication 166.67 MILLIGRAM(S): at 05:46

## 2023-05-08 RX ADMIN — BUDESONIDE AND FORMOTEROL FUMARATE DIHYDRATE 2 PUFF(S): 160; 4.5 AEROSOL RESPIRATORY (INHALATION) at 21:24

## 2023-05-08 RX ADMIN — Medication 650 MILLIGRAM(S): at 17:06

## 2023-05-08 RX ADMIN — OXYCODONE HYDROCHLORIDE 5 MILLIGRAM(S): 5 TABLET ORAL at 10:37

## 2023-05-08 NOTE — PROGRESS NOTE ADULT - ASSESSMENT
57-year-old male with past medical history bipolar, schizophrenia, hypogammaglobulinemia (monthly IVIG), HTN, DM 2, common variable immunodeficiency presenting with 2 weeks of pain/redness/drainage from right sided gluteal wound. He reports was recently at [facility] but left AMA, has been taking clindamycin without much improvement. Surgery consulted to evaluate, previously involved with debridement of left gluteal wound.    Patient is non toxic appearing and does not require urgent debridement.    Plan:  - No acute surgical interventions at this time  - Continue local wound care (can consult wound care team)  - Continue abx  - Continue glycemic management and nutritional optimization  - Appreciate heme/onc and derm recs  - Debridement would results in a larger wound with potential healing complications, so will hold off for now      B Team Surgery  [id number]

## 2023-05-08 NOTE — PROGRESS NOTE ADULT - ASSESSMENT
Mr. 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

## 2023-05-08 NOTE — CONSULT NOTE ADULT - ASSESSMENT
Favor abscess with surrounding cellulitis on the right buttock  - attempted to obtain wound cultures but patient off unit at echo  - continue with antibiotics per ID  - favor incision and drainage with surgery    The patient's chart was reviewed in addition to being seen and examined at bedside with the dermatology attending Dr. Shah. Recommendations were communicated with the primary team.  Please page [phone] w/10 digit call back number for further related questions.    Alix Disla MD  Resident Physician, PGY3  [facility]  Pager: [phone]  Office: [phone]   Abscess with surrounding cellulitis on the right buttock  - attempted to obtain wound cultures but patient off unit at echo  - continue with antibiotics per ID  - favor incision and drainage with surgery    The patient's chart was reviewed in addition to being seen and examined at bedside with the dermatology attending Dr. Shah. Recommendations were communicated with the primary team.  Please page [phone] w/10 digit call back number for further related questions.    Alix Disla MD  Resident Physician, PGY3  [facility]  Pager: [phone]  Office: [phone]

## 2023-05-08 NOTE — PROGRESS NOTE ADULT - PROBLEM SELECTOR PLAN 2
Historically diagnosed. Pt confirms schizophrenia but denies being on any active psychotropic medications. Denies AH/VH. Denies SI/HI. Does not specify why he left AMA from other hospital, but does understand that he needs help here. historically lacked capacity to leave AMA, but not trying at this time.  - No need for 1:1 at this time  - Psychiatry/ Consult placed today to reconnect pt with  services & optimize inpatient stay  - If pt tries to leave AMA, will need to assess capacity (incl. judgement/understanding of risk/benefit to incomplete treatment)  - C/W Hydroxyzine 50mg PO QHS PRN (anxiety)

## 2023-05-08 NOTE — PROGRESS NOTE ADULT - PROBLEM SELECTOR PLAN 5
Pt with h/o variable hyponatremia. SOsm calc c/w hypotonic hyponatremia. Farrukh low suggestive of sodium-avid state (i.e. RAAS activation), UOsm quite low, though c/f psychogenic polydipsia vs poor solute intake.. Unclear contribution of ARB as OP as pt not filling medications consistently.   - Trend Na   -will trial fluid restriction

## 2023-05-08 NOTE — BH CONSULTATION LIAISON ASSESSMENT NOTE - NSBHCHARTREVIEWLAB_PSY_A_CORE FT
13.0   9.58  )-----------( 211      ( 16 Mar 2023 07:05 )             40.7   03-15    129<L>  |  90<L>  |  11  ----------------------------<  112<H>  4.1   |  31  |  0.70    Ca    9.0      15 Mar 2023 12:18  Phos  3.8     03-16  Mg     2.00     03-16    TPro  6.2  /  Alb  3.4  /  TBili  0.2  /  DBili  x   /  AST  186<H>  /  ALT  96<H>  /  AlkPhos  163<H>  03-15

## 2023-05-08 NOTE — BH CONSULTATION LIAISON ASSESSMENT NOTE - SUMMARY
56M, domiciled in supportive housing TSI, SSD, single, PMH HTN, DM, hypogammaglobulinemia, PPHx schizoaffective d/o, bipolar d/o, hx of multiple psych hospitalizations (last known in 2020 at [facility]), denies hx SA/NSSIB, denies drug or alcohol use, denies legal hx, who presented with right gluteal wound, admitted to medicine for cellulutis. Psychiatry consulted for psychosis/schizophrenia.    On assessment, patient is calm and cooperative. AAO3. No overt symptoms of psychosis, depression, or yo. No S/I or H/I. Patient w/ recurrent/chronic infections. Patient has left multiple hospitals AMA while undergoing treatment for cellulitis. However, patient recognizes benefit of continuing w/ antibiotic treatment, as well as risks of discontinuing treatment. Patient w/o severe psychiatric symptoms interfering w/ ability to care for self or make informed decisions. Patient interested in reconnection to outpatient care after discharge. Patient was due for Haldol decanoate 150mg on 4/16. Will obtain collateral see when last received.     Plan:  - Routine observation, no psychiatric indication for CO 1:1  - Will determine date of last Haldol dec; needs EKG prior to administration  - Zyprexa 2.5-5mg PO/IM/IV q6hrs PRN for agitation  - Will obtain additional collateral   - Dispo: when medically cleared; likely does not meet criteria for inpatient psychiatric admission      56M, domiciled in supportive housing TSI, SSD, single, PMH HTN, DM, hypogammaglobulinemia, PPHx schizoaffective d/o, bipolar d/o, hx of multiple psych hospitalizations (last known in 2020 at [facility]), denies hx SA/NSSIB, denies drug or alcohol use, denies legal hx, who presented with right gluteal wound, admitted to medicine for cellulutis. Psychiatry consulted for psychosis/schizophrenia.    On assessment, patient is calm and cooperative. AAO3. No overt symptoms of psychosis, depression, or yo. No S/I or H/I. Patient w/ recurrent/chronic infections. Patient has left multiple hospitals AMA while undergoing treatment for cellulitis. However, patient recognizes benefit of continuing w/ antibiotic treatment, as well as risks of discontinuing treatment. Patient not currently refusing treatment or attempting to leave AMA. Patient w/o severe psychiatric symptoms interfering w/ ability to care for self or make informed decisions. Patient interested in reconnection to outpatient care after discharge. Patient was due for Haldol decanoate 150mg on 4/16. Will obtain collateral see when last received.     Plan:  - Routine observation, no psychiatric indication for CO 1:1  - Will determine date of last Haldol dec; can be given during hospitalization; needs EKG prior to administration  - Zyprexa 2.5-5mg PO/IM/IV q6hrs PRN for agitation  - Will obtain additional collateral   - Dispo: when medically cleared; likely does not meet criteria for inpatient psychiatric admission      56M, domiciled in supportive housing TSI, SSD, single, PMH HTN, DM, hypogammaglobulinemia, PPHx schizoaffective d/o, bipolar d/o, hx of multiple psych hospitalizations (last known in 2020 at [facility]), denies hx SA/NSSIB, denies drug or alcohol use, denies legal hx, who presented with right gluteal wound, admitted to medicine for cellulutis. Psychiatry consulted for psychosis/schizophrenia.    On assessment, patient is calm and cooperative. AAO3. No overt symptoms of psychosis, depression, or yo. No S/I or H/I. Patient w/ recurrent/chronic infections. Patient has left multiple hospitals AMA while undergoing treatment for cellulitis. However, patient recognizes benefit of continuing w/ antibiotic treatment, as well as risks of discontinuing treatment. Patient not currently refusing treatment or attempting to leave AMA. Patient w/o severe psychiatric symptoms interfering w/ ability to care for self or make informed decisions. Patient interested in reconnection to outpatient care after discharge. Patient was due for Haldol decanoate 150mg on 4/16. Will obtain collateral see when last received.     Plan:  - Routine observation, no psychiatric indication for CO 1:1  - Will determine date of last Haldol dec; can be given during hospitalization; needs EKG prior to administration  - If patient refuses treatment or attempts to leave AMA, psych can reassess for capacity   - Zyprexa 2.5-5mg PO/IM/IV q6hrs PRN for agitation  - Will obtain additional collateral   - Dispo: when medically cleared; likely does not meet criteria for inpatient psychiatric admission

## 2023-05-08 NOTE — BH CONSULTATION LIAISON ASSESSMENT NOTE - HPI (INCLUDE ILLNESS QUALITY, SEVERITY, DURATION, TIMING, CONTEXT, MODIFYING FACTORS, ASSOCIATED SIGNS AND SYMPTOMS)
56M, domiciled in supportive housing TSI, SSD, single, PMH HTN, DM, hypogammaglobulinemia, PPHx schizoaffective d/o, bipolar d/o, hx of multiple psych hospitalizations (last known in 2020 at [facility]), denies hx SA/NSSIB, denies drug or alcohol use, denies legal hx, who presented with right gluteal wound, admitted to medicine for cellulutis. Psychiatry consulted for psychosis/schizophrenia.    Chart reviewed. Patient evaluated at bedside. AAO3. Reports he is in the hospital for skin infection. Feels better physically. Describes past history of schizophrenia/schizoaffective disorder, w/ prominent paranoia, IOR, and A/H. Was in treatment w/ Dr. Cook at [facility] but was lost to f/u. On Haldol decanoate 150mg w6alwwm (due 4/16/23, unclear when received last dose). Denies current psychotic symptoms, including paranoia, IOR, A/H, mind reading, thought insertion, and thought broadcasting. Feels safe in the hospital. Denies depression and anxiety. Reports fair mood. Appetite and sleep in tact. No S/I or H/I. Interested in resuming outpatient psychiatric treatment.     Patient w/ recent history of leaving hospitals AMA for cellulitis, including from [facility] 3 days before admission to [facility]. Patient recognizes the importance of staying in hospital for cellulitis treatment. Understands the risks of discontinuing treatment and leaving hospital, including worsening infection and death. States repeatedly, "I wouldn't be here if I didn't want to get better."     Patient seen by CL service in March 2023 for similar presentation:   "States he is doing well, oriented x3. States he came to hospital for both medical and psychiatric problems. Says he has been not taking good care of himself due to poor energy. Showers 1x/week due to buttock wounds having foul smells. He denied any depression or anxiety. Has been having good sleep and appetite. No SI or HI. No AVH or paranoia. States last hospitalization was in 2022 for depression due to mother passing away and having housing crisis. When asking about homeless man living in his home, states he did it because he ran out of money for cigarettes and told him that he could stay at his place in exchange for cigarettes packs. When mentioned some of the safety concerns with that, patient endorses understanding. Pt says he sees Dr. Cook at [facility] and gets monthly injection. States he got his last dose from [facility] last month on 2/15. Denies any SA in the past."

## 2023-05-08 NOTE — PROGRESS NOTE ADULT - PROBLEM SELECTOR PLAN 4
PT with h/o CVID requiring IgG infusions q1mo (last 4/13/2023).  - OP Immunologist: Dr. Mitchell Boxer ([facility])  - Allergy and Immunology consulted today, f/u recs

## 2023-05-08 NOTE — PROGRESS NOTE ADULT - SUBJECTIVE AND OBJECTIVE BOX
PROGRESS NOTE:    Stephanie Lagunas MD  Internal Medicine PGY-2  Available on [employer] Teams      Patient is a 57y old  Male who presents with a chief complaint of     SUBJECTIVE / OVERNIGHT EVENTS: No acute overnight events. Pt seen and examined. Denies fevers, chills, CP, SOB, Abdominal pain, N/V, Constipation, Diarrhea      MEDICATIONS  (STANDING):  atorvastatin 40 milliGRAM(s) Oral at bedtime  budesonide 160 MICROgram(s)/formoterol 4.5 MICROgram(s) Inhaler 2 Puff(s) Inhalation two times a day  cefepime   IVPB 2000 milliGRAM(s) IV Intermittent every 12 hours  cefepime   IVPB      cholecalciferol 2000 Unit(s) Oral daily  dextrose 5%. 1000 milliLiter(s) (100 mL/Hr) IV Continuous <Continuous>  dextrose 5%. 1000 milliLiter(s) (50 mL/Hr) IV Continuous <Continuous>  dextrose 50% Injectable 25 Gram(s) IV Push once  dextrose 50% Injectable 25 Gram(s) IV Push once  dextrose 50% Injectable 12.5 Gram(s) IV Push once  diltiazem    milliGRAM(s) Oral every 24 hours  enoxaparin Injectable 40 milliGRAM(s) SubCutaneous every 24 hours  glucagon  Injectable 1 milliGRAM(s) IntraMuscular once  insulin lispro (ADMELOG) corrective regimen sliding scale   SubCutaneous at bedtime  insulin lispro (ADMELOG) corrective regimen sliding scale   SubCutaneous three times a day before meals  losartan 100 milliGRAM(s) Oral daily  vancomycin  IVPB 1250 milliGRAM(s) IV Intermittent every 8 hours    MEDICATIONS  (PRN):  acetaminophen     Tablet .. 975 milliGRAM(s) Oral every 6 hours PRN Temp greater or equal to 38C (100.4F), Mild Pain (1 - 3), Moderate Pain (4 - 6), Severe Pain (7 - 10)  albuterol    90 MICROgram(s) HFA Inhaler 2 Puff(s) Inhalation every 6 hours PRN Shortness of Breath and/or Wheezing  dextrose Oral Gel 15 Gram(s) Oral once PRN Blood Glucose LESS THAN 70 milliGRAM(s)/deciliter  hydrOXYzine hydrochloride 50 milliGRAM(s) Oral at bedtime PRN Anxiety  oxyCODONE    IR 5 milliGRAM(s) Oral every 4 hours PRN Moderate Pain (4 - 6)      I&O's Summary    07 May 2023 07:01  -  08 May 2023 07:00  --------------------------------------------------------  IN: 4337 mL / OUT: 800 mL / NET: 3537 mL        Vital Signs Last 24 Hrs  T(C): 37.3 (07 May 2023 22:29), Max: 37.3 (07 May 2023 22:29)  T(F): 99.1 (07 May 2023 22:29), Max: 99.1 (07 May 2023 22:29)  HR: 95 (08 May 2023 02:14) (95 - 101)  BP: 145/77 (08 May 2023 02:14) (145/77 - 186/97)  BP(mean): --  RR: 20 (07 May 2023 22:40) (19 - 20)  SpO2: 94% (07 May 2023 22:40) (93% - 94%)    Parameters below as of 07 May 2023 22:40  Patient On (Oxygen Delivery Method): room air        =================PHYSICAL EXAM=================    GENERAL: NAD, lying in bed comfortably  HEAD:  Atraumatic, Normocephalic  EYES: EOMI, PERRLA, conjunctiva and sclera clear  ENT: Moist mucous membranes  NECK: Supple, No JVD  CHEST/LUNG: Clear to auscultation bilaterally; No rales, rhonchi, wheezing, or rubs. Unlabored respirations  HEART: Regular rate and rhythm; No murmurs, rubs, or gallops  ABDOMEN: Bowel sounds present; Soft, Nontender, Nondistended. No hepatomegally  EXTREMITIES:  2+ Peripheral Pulses, brisk capillary refill. No clubbing, cyanosis, or edema  NERVOUS SYSTEM:  Alert & Oriented X3, speech clear. No deficits   MSK: FROM all 4 extremities, full and equal strength  SKIN: No rashes or lesions    =================================================    LABS:                        11.7   10.98 )-----------( 275      ( 08 May 2023 05:55 )             35.1     Auto Eosinophil # 0.24  / Auto Eosinophil % 2.2   / Auto Neutrophil # 8.08  / Auto Neutrophil % 73.5  / BANDS % x                            12.1   15.68 )-----------( 256      ( 07 May 2023 05:33 )             36.6     Auto Eosinophil # 0.05  / Auto Eosinophil % 0.3   / Auto Neutrophil # 12.75 / Auto Neutrophil % 81.3  / BANDS % x                            11.9   13.34 )-----------( 274      ( 06 May 2023 14:50 )             35.3     Auto Eosinophil # 0.00  / Auto Eosinophil % 0.0   / Auto Neutrophil # 10.18 / Auto Neutrophil % 73.7  / BANDS % 2.6      05-08    127<L>  |  88<L>  |  7   ----------------------------<  104<H>  4.3   |  28  |  0.69  05-07    126<L>  |  88<L>  |  7   ----------------------------<  107<H>  4.6   |  24  |  0.73  05-06    122<L>  |  85<L>  |  5<L>  ----------------------------<  116<H>  4.7   |  22  |  0.67    Ca    9.1      08 May 2023 05:55  Mg     2.20     05-08  Phos  3.7     05-08  TPro  6.6  /  Alb  3.4  /  TBili  0.2  /  DBili  x   /  AST  24  /  ALT  25  /  AlkPhos  194<H>  05-08  TPro  6.6  /  Alb  3.4  /  TBili  0.2  /  DBili  x   /  AST  15  /  ALT  16  /  AlkPhos  176<H>  05-07  TPro  6.9  /  Alb  3.8  /  TBili  0.2  /  DBili  x   /  AST  32  /  ALT  20  /  AlkPhos  163<H>  05-06    PT/INR - ( 06 May 2023 14:50 )   PT: 14.7 sec;   INR: 1.26 ratio         PTT - ( 06 May 2023 14:50 )  PTT:30.1 sec      Urinalysis Basic - ( 06 May 2023 21:40 )    Color: Colorless / Appearance: Clear / S.020 / pH: x  Gluc: x / Ketone: Negative  / Bili: Negative / Urobili: <2 mg/dL   Blood: x / Protein: 30 mg/dL / Nitrite: Negative   Leuk Esterase: Negative / RBC: 2 /HPF / WBC 0 /HPF   Sq Epi: x / Non Sq Epi: x / Bacteria: Negative            RADIOLOGY & ADDITIONAL TESTS:    Imaging Personally Reviewed:    Consultant(s) Notes Reviewed:      Care Discussed with Consultants/Other Providers:   PROGRESS NOTE:    Stephanie Lagunas MD  Internal Medicine PGY-2  Available on [employer] Teams      Patient is a 57y old  Male who presents with a chief complaint of     SUBJECTIVE / OVERNIGHT EVENTS: No acute overnight events. Pt seen and examined. He endorses some slight pain in lower left rib. Denies pain in right buttock region and states that he feels fine otherwise. Denies fevers, chills, CP, SOB.       MEDICATIONS  (STANDING):  atorvastatin 40 milliGRAM(s) Oral at bedtime  budesonide 160 MICROgram(s)/formoterol 4.5 MICROgram(s) Inhaler 2 Puff(s) Inhalation two times a day  cefepime   IVPB 2000 milliGRAM(s) IV Intermittent every 12 hours  cefepime   IVPB      cholecalciferol 2000 Unit(s) Oral daily  dextrose 5%. 1000 milliLiter(s) (100 mL/Hr) IV Continuous <Continuous>  dextrose 5%. 1000 milliLiter(s) (50 mL/Hr) IV Continuous <Continuous>  dextrose 50% Injectable 25 Gram(s) IV Push once  dextrose 50% Injectable 25 Gram(s) IV Push once  dextrose 50% Injectable 12.5 Gram(s) IV Push once  diltiazem    milliGRAM(s) Oral every 24 hours  enoxaparin Injectable 40 milliGRAM(s) SubCutaneous every 24 hours  glucagon  Injectable 1 milliGRAM(s) IntraMuscular once  insulin lispro (ADMELOG) corrective regimen sliding scale   SubCutaneous at bedtime  insulin lispro (ADMELOG) corrective regimen sliding scale   SubCutaneous three times a day before meals  losartan 100 milliGRAM(s) Oral daily  vancomycin  IVPB 1250 milliGRAM(s) IV Intermittent every 8 hours    MEDICATIONS  (PRN):  acetaminophen     Tablet .. 975 milliGRAM(s) Oral every 6 hours PRN Temp greater or equal to 38C (100.4F), Mild Pain (1 - 3), Moderate Pain (4 - 6), Severe Pain (7 - 10)  albuterol    90 MICROgram(s) HFA Inhaler 2 Puff(s) Inhalation every 6 hours PRN Shortness of Breath and/or Wheezing  dextrose Oral Gel 15 Gram(s) Oral once PRN Blood Glucose LESS THAN 70 milliGRAM(s)/deciliter  hydrOXYzine hydrochloride 50 milliGRAM(s) Oral at bedtime PRN Anxiety  oxyCODONE    IR 5 milliGRAM(s) Oral every 4 hours PRN Moderate Pain (4 - 6)      I&O's Summary    07 May 2023 07:01  -  08 May 2023 07:00  --------------------------------------------------------  IN: 4337 mL / OUT: 800 mL / NET: 3537 mL        Vital Signs Last 24 Hrs  T(C): 37.3 (07 May 2023 22:29), Max: 37.3 (07 May 2023 22:29)  T(F): 99.1 (07 May 2023 22:29), Max: 99.1 (07 May 2023 22:29)  HR: 95 (08 May 2023 02:14) (95 - 101)  BP: 145/77 (08 May 2023 02:14) (145/77 - 186/97)  BP(mean): --  RR: 20 (07 May 2023 22:40) (19 - 20)  SpO2: 94% (07 May 2023 22:40) (93% - 94%)    Parameters below as of 07 May 2023 22:40  Patient On (Oxygen Delivery Method): room air        =================PHYSICAL EXAM=================    GENERAL: NAD, sitting up in bed comfortably   HEAD:  Atraumatic, Normocephalic  ENT: Moist mucous membranes  CHEST/LUNG: Clear to auscultation bilaterally; No rales, rhonchi, wheezing, or rubs. Unlabored respirations  HEART: Regular rate and rhythm; No murmurs, rubs, or gallops  ABDOMEN: Bowel sounds present; Soft, Nontender, Nondistended. No hepatomegally  EXTREMITIES:  2+ Peripheral Pulses, brisk capillary refill. No clubbing, cyanosis, or edema  NERVOUS SYSTEM:  Alert & Oriented X3, speech clear  MSK: FROM all 4 extremities, full and equal strength  Psych: Appropriate mood;  flat affect   SKIN: R-gluteus: warm to touch, ~10 cmm erythematous circular wound with  purulence     =================================================    LABS:                        11.7   10.98 )-----------( 275      ( 08 May 2023 05:55 )             35.1     Auto Eosinophil # 0.24  / Auto Eosinophil % 2.2   / Auto Neutrophil # 8.08  / Auto Neutrophil % 73.5  / BANDS % x                            12.1   15.68 )-----------( 256      ( 07 May 2023 05:33 )             36.6     Auto Eosinophil # 0.05  / Auto Eosinophil % 0.3   / Auto Neutrophil # 12.75 / Auto Neutrophil % 81.3  / BANDS % x                            11.9   13.34 )-----------( 274      ( 06 May 2023 14:50 )             35.3     Auto Eosinophil # 0.00  / Auto Eosinophil % 0.0   / Auto Neutrophil # 10.18 / Auto Neutrophil % 73.7  / BANDS % 2.6      05-08    127<L>  |  88<L>  |  7   ----------------------------<  104<H>  4.3   |  28  |  0.69  05-07    126<L>  |  88<L>  |  7   ----------------------------<  107<H>  4.6   |  24  |  0.73  05-06    122<L>  |  85<L>  |  5<L>  ----------------------------<  116<H>  4.7   |  22  |  0.67    Ca    9.1      08 May 2023 05:55  Mg     2.20     05-08  Phos  3.7     05-08  TPro  6.6  /  Alb  3.4  /  TBili  0.2  /  DBili  x   /  AST  24  /  ALT  25  /  AlkPhos  194<H>  05-08  TPro  6.6  /  Alb  3.4  /  TBili  0.2  /  DBili  x   /  AST  15  /  ALT  16  /  AlkPhos  176<H>  05-07  TPro  6.9  /  Alb  3.8  /  TBili  0.2  /  DBili  x   /  AST  32  /  ALT  20  /  AlkPhos  163<H>      PT/INR - ( 06 May 2023 14:50 )   PT: 14.7 sec;   INR: 1.26 ratio         PTT - ( 06 May 2023 14:50 )  PTT:30.1 sec      Urinalysis Basic - ( 06 May 2023 21:40 )    Color: Colorless / Appearance: Clear / S.020 / pH: x  Gluc: x / Ketone: Negative  / Bili: Negative / Urobili: <2 mg/dL   Blood: x / Protein: 30 mg/dL / Nitrite: Negative   Leuk Esterase: Negative / RBC: 2 /HPF / WBC 0 /HPF   Sq Epi: x / Non Sq Epi: x / Bacteria: Negative            RADIOLOGY & ADDITIONAL TESTS:    Imaging Personally Reviewed:    Consultant(s) Notes Reviewed:      Care Discussed with Consultants/Other Providers:

## 2023-05-08 NOTE — PROGRESS NOTE ADULT - PROBLEM SELECTOR PLAN 3
On admission Diltiazem reduced to 180 daily, however he had elevated BPs in the past 24 hrs     - C/W Home Losartan 75mg PO QD  - increase diltiazem to  Home Diltiazem ER 300mg PO QD

## 2023-05-08 NOTE — CONSULT NOTE ADULT - SUBJECTIVE AND OBJECTIVE BOX
Patient is a 57y old  Male who presents with a chief complaint of   HPI:  Patient is a 57 year old M hx of bipolar, chronic bronchiectasis, schizophrenia, hypogammaglobulinemia (monthly IVIG), common variable immunodeficiency, HTN, who presents to the ER w/ right gluteal wound and erythema with pus as well. He was hospitalized recently for MRSA infection and s/p vancomycin extended duration. Patient left AMA from [facility] 2 days ago and was on clindamycin with worsening symptoms. He denies fevers, chills, nausea, vomiting, diarrhea, sob, headaches, visual changes, LE swelling, dysuria, polyuria, melena or hematochezia. Has left rib pain on inspiration.    ED course: CT pelvis w/ iv contrast noted buttocks cellulitis, chronic bronchiectasis noted, leukocytosis to 13k, and hyponatremia 122.  (07 May 2023 02:36)      Allergies    amoxicillin (Fever)  penicillin (Rash)    Intolerances      MEDICATIONS  (STANDING):  acetaminophen     Tablet .. 650 milliGRAM(s) Oral once  atorvastatin 40 milliGRAM(s) Oral at bedtime  budesonide 160 MICROgram(s)/formoterol 4.5 MICROgram(s) Inhaler 2 Puff(s) Inhalation two times a day  cholecalciferol 2000 Unit(s) Oral daily  dextrose 5%. 1000 milliLiter(s) (100 mL/Hr) IV Continuous <Continuous>  dextrose 5%. 1000 milliLiter(s) (50 mL/Hr) IV Continuous <Continuous>  dextrose 50% Injectable 25 Gram(s) IV Push once  dextrose 50% Injectable 25 Gram(s) IV Push once  dextrose 50% Injectable 12.5 Gram(s) IV Push once  diltiazem    milliGRAM(s) Oral daily  diphenhydrAMINE 50 milliGRAM(s) Oral once  enoxaparin Injectable 40 milliGRAM(s) SubCutaneous every 24 hours  glucagon  Injectable 1 milliGRAM(s) IntraMuscular once  immune   globulin 10% (GAMMAGARD) IVPB 75 Gram(s) IV Intermittent once  insulin lispro (ADMELOG) corrective regimen sliding scale   SubCutaneous at bedtime  insulin lispro (ADMELOG) corrective regimen sliding scale   SubCutaneous three times a day before meals  losartan 100 milliGRAM(s) Oral daily  vancomycin  IVPB 1250 milliGRAM(s) IV Intermittent every 8 hours    MEDICATIONS  (PRN):  acetaminophen     Tablet .. 975 milliGRAM(s) Oral every 6 hours PRN Temp greater or equal to 38C (100.4F), Mild Pain (1 - 3), Moderate Pain (4 - 6), Severe Pain (7 - 10)  albuterol    90 MICROgram(s) HFA Inhaler 2 Puff(s) Inhalation every 6 hours PRN Shortness of Breath and/or Wheezing  dextrose Oral Gel 15 Gram(s) Oral once PRN Blood Glucose LESS THAN 70 milliGRAM(s)/deciliter  hydrOXYzine hydrochloride 50 milliGRAM(s) Oral at bedtime PRN Anxiety  oxyCODONE    IR 5 milliGRAM(s) Oral every 4 hours PRN Moderate Pain (4 - 6)      PAST MEDICAL & SURGICAL HISTORY:  Smoker      DM (diabetes mellitus)      HTN (hypertension)      Abscess of finger      Bipolar disorder      Chronic hyponatremia      CVID (common variable immunodeficiency)      Hyponatremia      Deviated septum      Loss of teeth due to extraction  All teeth due to dental carries          REVIEW OF SYSTEMS  All review of systems negative, except for those marked: see hpi     SOCIAL/ENVIRONMENTAL HISTORY:  Family Lives:  Education Level:  Tobacco	[] No	[X] Yes:    FAMILY HISTORY:  Family history of throat cancer (Father)    Family history of leukemia (Mother)      Vital Signs Last 24 Hrs  T(C): 36.6 (08 May 2023 14:45), Max: 37.3 (07 May 2023 22:29)  T(F): 97.9 (08 May 2023 14:45), Max: 99.1 (07 May 2023 22:29)  HR: 85 (08 May 2023 14:45) (85 - 101)  BP: 142/83 (08 May 2023 14:45) (142/83 - 186/97)  BP(mean): --  RR: 17 (08 May 2023 14:45) (17 - 20)  SpO2: 91% (08 May 2023 14:45) (90% - 94%)    Parameters below as of 08 May 2023 14:45  Patient On (Oxygen Delivery Method): nasal cannula        PHYSICAL EXAM  All physical exam findings normal, except for those marked:  General: alert, well developed/well nourished, no acute distress  Eyes: no conjunctival injection, no discharge, no photophobia, intact extraocular movements, sclera not icteric, no suborbital bogginess  ENT: normal tongue and lips  Cardiovascular: regular rate and rhythm; Normal S1, S2; No murmur  Respiratory: no retractions  Skin: skin intact and not indurated; no rash, no desquamation  Neurologic: alert, appropriate for age, cranial nerves II-XII grossly normal  Musculoskeletal: no clubbing; no cyanosis; no edema    Lab Results:                        11.7   10.98 )-----------( 275      ( 08 May 2023 05:55 )             35.1     05-08    127<L>  |  88<L>  |  7   ----------------------------<  104<H>  4.3   |  28  |  0.69    Ca    9.1      08 May 2023 05:55  Phos  3.7     05-08  Mg     2.20     -08    TPro  6.6  /  Alb  3.4  /  TBili  0.2  /  DBili  x   /  AST  24  /  ALT  25  /  AlkPhos  194<H>  05-08    LIVER FUNCTIONS - ( 08 May 2023 05:55 )  Alb: 3.4 g/dL / Pro: 6.6 g/dL / ALK PHOS: 194 U/L / ALT: 25 U/L / AST: 24 U/L / GGT: x             Urinalysis Basic - ( 06 May 2023 21:40 )    Color: Colorless / Appearance: Clear / S.020 / pH: x  Gluc: x / Ketone: Negative  / Bili: Negative / Urobili: <2 mg/dL   Blood: x / Protein: 30 mg/dL / Nitrite: Negative   Leuk Esterase: Negative / RBC: 2 /HPF / WBC 0 /HPF   Sq Epi: x / Non Sq Epi: x / Bacteria: Negative        IMAGING STUDIES:   Patient is a 57y old  Male who presents with a chief complaint of   HPI:  57 year old male with schizoaffective disorder, HTN, T2DM (A1C 6.3), obesity, chronic bronchiectasis, and CVID on Gammagard S/D 70 grams per month (last infusion ) presenting with R gluteal wound and erythema with purulent drainage, admitted for MRSA bacteremia in the setting of right gluteal cellulitis. IgG came back low at 489. His last infusion was . Allergy/Immunology consulted for CVID management. He is now on vancomycin.     Labs:   Leukocytosis to 13k on admission, neutrophilic predominance   IgG low at 489, IgA <2, IgM low at 23    Hyponatremic to 120s, low serum osmolality     Allergies:  Amoxicillin is recorded as causing fever, penicillin is recorded as causing rash. His outpatient record does not mention these. He appears to have tolerated Augmentin as of 2023.   Doxycycline -     Intolerances      MEDICATIONS  (STANDING):  acetaminophen     Tablet .. 650 milliGRAM(s) Oral once  atorvastatin 40 milliGRAM(s) Oral at bedtime  budesonide 160 MICROgram(s)/formoterol 4.5 MICROgram(s) Inhaler 2 Puff(s) Inhalation two times a day  cholecalciferol 2000 Unit(s) Oral daily  dextrose 5%. 1000 milliLiter(s) (100 mL/Hr) IV Continuous <Continuous>  dextrose 5%. 1000 milliLiter(s) (50 mL/Hr) IV Continuous <Continuous>  dextrose 50% Injectable 25 Gram(s) IV Push once  dextrose 50% Injectable 25 Gram(s) IV Push once  dextrose 50% Injectable 12.5 Gram(s) IV Push once  diltiazem    milliGRAM(s) Oral daily  diphenhydrAMINE 50 milliGRAM(s) Oral once  enoxaparin Injectable 40 milliGRAM(s) SubCutaneous every 24 hours  glucagon  Injectable 1 milliGRAM(s) IntraMuscular once  immune   globulin 10% (GAMMAGARD) IVPB 75 Gram(s) IV Intermittent once  insulin lispro (ADMELOG) corrective regimen sliding scale   SubCutaneous at bedtime  insulin lispro (ADMELOG) corrective regimen sliding scale   SubCutaneous three times a day before meals  losartan 100 milliGRAM(s) Oral daily  vancomycin  IVPB 1250 milliGRAM(s) IV Intermittent every 8 hours    MEDICATIONS  (PRN):  acetaminophen     Tablet .. 975 milliGRAM(s) Oral every 6 hours PRN Temp greater or equal to 38C (100.4F), Mild Pain (1 - 3), Moderate Pain (4 - 6), Severe Pain (7 - 10)  albuterol    90 MICROgram(s) HFA Inhaler 2 Puff(s) Inhalation every 6 hours PRN Shortness of Breath and/or Wheezing  dextrose Oral Gel 15 Gram(s) Oral once PRN Blood Glucose LESS THAN 70 milliGRAM(s)/deciliter  hydrOXYzine hydrochloride 50 milliGRAM(s) Oral at bedtime PRN Anxiety  oxyCODONE    IR 5 milliGRAM(s) Oral every 4 hours PRN Moderate Pain (4 - 6)      PAST MEDICAL & SURGICAL HISTORY:  Smoker      DM (diabetes mellitus)      HTN (hypertension)      Abscess of finger      Bipolar disorder      Chronic hyponatremia      CVID (common variable immunodeficiency)      Hyponatremia      Deviated septum      Loss of teeth due to extraction  All teeth due to dental carries          REVIEW OF SYSTEMS  All review of systems negative, except for those marked: see hpi     SOCIAL/ENVIRONMENTAL HISTORY:  Family Lives:  Education Level:  Tobacco	[] No	[X] Yes:    FAMILY HISTORY:  Family history of throat cancer (Father)    Family history of leukemia (Mother)      Vital Signs Last 24 Hrs  T(C): 36.6 (08 May 2023 14:45), Max: 37.3 (07 May 2023 22:29)  T(F): 97.9 (08 May 2023 14:45), Max: 99.1 (07 May 2023 22:29)  HR: 85 (08 May 2023 14:45) (85 - 101)  BP: 142/83 (08 May 2023 14:45) (142/83 - 186/97)  BP(mean): --  RR: 17 (08 May 2023 14:45) (17 - 20)  SpO2: 91% (08 May 2023 14:45) (90% - 94%)    Parameters below as of 08 May 2023 14:45  Patient On (Oxygen Delivery Method): nasal cannula        Lab Results:                        11.7   10.98 )-----------( 275      ( 08 May 2023 05:55 )             35.1     05-08    127<L>  |  88<L>  |  7   ----------------------------<  104<H>  4.3   |  28  |  0.69    Ca    9.1      08 May 2023 05:55  Phos  3.7     05-08  Mg     2.20     -08    TPro  6.6  /  Alb  3.4  /  TBili  0.2  /  DBili  x   /  AST  24  /  ALT  25  /  AlkPhos  194<H>  05-08    LIVER FUNCTIONS - ( 08 May 2023 05:55 )  Alb: 3.4 g/dL / Pro: 6.6 g/dL / ALK PHOS: 194 U/L / ALT: 25 U/L / AST: 24 U/L / GGT: x             Urinalysis Basic - ( 06 May 2023 21:40 )    Color: Colorless / Appearance: Clear / S.020 / pH: x  Gluc: x / Ketone: Negative  / Bili: Negative / Urobili: <2 mg/dL   Blood: x / Protein: 30 mg/dL / Nitrite: Negative   Leuk Esterase: Negative / RBC: 2 /HPF / WBC 0 /HPF   Sq Epi: x / Non Sq Epi: x / Bacteria: Negative        IMAGING STUDIES:  < from: CT Pelvis w/ IV Cont (23 @ 21:22) >  PROCEDURE:  CT of the Pelvis was performed.  Sagittal and coronal reformats were performed.    FINDINGS:  BLADDER: Opacified with excreted intravenous contrast.  REPRODUCTIVE ORGANS: Prostate within normal limits.  LYMPH NODES: No pelvic lymphadenopathy.    VISUALIZED PORTIONS:  ABDOMINALORGANS: Within normal limits.  BOWEL: No obstruction or inflammation.  PERITONEUM: Trace free pelvic fluid.  VESSELS: Within normal limits.  ABDOMINAL WALL: Moderate subcutaneous inflammatory change and skin   thickening in the right buttocks suggestive of a cellulitis. No   associated rim-enhancing fluid collection to suggest an abscess. No   subcutaneous gas.  BONES: No fracture or dislocation. Degenerative changes of the visualized   lower lumbar spine.    IMPRESSION:  Moderate subcutaneous inflammatory change and skin thickening in the   right buttocks suggestive of a cellulitis. No associated rim-enhancing   fluid collection to suggest an abscess. No subcutaneous gas.    --- End of Report ---          VICENTE GARCIA MD; Resident Radiologist  This document has been electronically signed.  ANGELINE DANIEL MD; Attending Radiologist  This document has been electronically signed. May  6 2023 10:36PM    < end of copied text >     Patient is a 57y old  Male who presents with a chief complaint of   HPI:  57 year old male with schizoaffective disorder, HTN, T2DM (A1C 6.3), obesity, chronic bronchiectasis, and CVID on Gammagard S/D 70 grams per month (last infusion ) presenting with R gluteal wound and erythema with purulent drainage, admitted for MRSA bacteremia in the setting of right gluteal cellulitis. IgG came back low at 489. His last infusion was . Allergy/Immunology consulted for CVID management. He is now on vancomycin.     Labs:   Leukocytosis to 13k on admission, neutrophilic predominance   IgG low at 489, IgA <2, IgM low at 23    Hyponatremic to 120s, low serum osmolality     Allergies:  Amoxicillin is recorded as causing fever, penicillin is recorded as causing rash. His outpatient record does not mention these. He appears to have tolerated Augmentin as of 2023.   Doxycycline is recorded in outpatient record - pt denies reaction    Intolerances      MEDICATIONS  (STANDING):  acetaminophen     Tablet .. 650 milliGRAM(s) Oral once  atorvastatin 40 milliGRAM(s) Oral at bedtime  budesonide 160 MICROgram(s)/formoterol 4.5 MICROgram(s) Inhaler 2 Puff(s) Inhalation two times a day  cholecalciferol 2000 Unit(s) Oral daily  dextrose 5%. 1000 milliLiter(s) (100 mL/Hr) IV Continuous <Continuous>  dextrose 5%. 1000 milliLiter(s) (50 mL/Hr) IV Continuous <Continuous>  dextrose 50% Injectable 25 Gram(s) IV Push once  dextrose 50% Injectable 25 Gram(s) IV Push once  dextrose 50% Injectable 12.5 Gram(s) IV Push once  diltiazem    milliGRAM(s) Oral daily  diphenhydrAMINE 50 milliGRAM(s) Oral once  enoxaparin Injectable 40 milliGRAM(s) SubCutaneous every 24 hours  glucagon  Injectable 1 milliGRAM(s) IntraMuscular once  immune   globulin 10% (GAMMAGARD) IVPB 75 Gram(s) IV Intermittent once  insulin lispro (ADMELOG) corrective regimen sliding scale   SubCutaneous at bedtime  insulin lispro (ADMELOG) corrective regimen sliding scale   SubCutaneous three times a day before meals  losartan 100 milliGRAM(s) Oral daily  vancomycin  IVPB 1250 milliGRAM(s) IV Intermittent every 8 hours    MEDICATIONS  (PRN):  acetaminophen     Tablet .. 975 milliGRAM(s) Oral every 6 hours PRN Temp greater or equal to 38C (100.4F), Mild Pain (1 - 3), Moderate Pain (4 - 6), Severe Pain (7 - 10)  albuterol    90 MICROgram(s) HFA Inhaler 2 Puff(s) Inhalation every 6 hours PRN Shortness of Breath and/or Wheezing  dextrose Oral Gel 15 Gram(s) Oral once PRN Blood Glucose LESS THAN 70 milliGRAM(s)/deciliter  hydrOXYzine hydrochloride 50 milliGRAM(s) Oral at bedtime PRN Anxiety  oxyCODONE    IR 5 milliGRAM(s) Oral every 4 hours PRN Moderate Pain (4 - 6)      PAST MEDICAL & SURGICAL HISTORY:  Smoker      DM (diabetes mellitus)      HTN (hypertension)      Abscess of finger      Bipolar disorder      Chronic hyponatremia      CVID (common variable immunodeficiency)      Hyponatremia      Deviated septum      Loss of teeth due to extraction  All teeth due to dental carries          REVIEW OF SYSTEMS  All review of systems negative, except for those marked: see hpi     SOCIAL/ENVIRONMENTAL HISTORY:  Family Lives:  Education Level:  Tobacco	[] No	[X] Yes:    FAMILY HISTORY:  Family history of throat cancer (Father)    Family history of leukemia (Mother)      Vital Signs Last 24 Hrs  T(C): 36.6 (08 May 2023 14:45), Max: 37.3 (07 May 2023 22:29)  T(F): 97.9 (08 May 2023 14:45), Max: 99.1 (07 May 2023 22:29)  HR: 85 (08 May 2023 14:45) (85 - 101)  BP: 142/83 (08 May 2023 14:45) (142/83 - 186/97)  BP(mean): --  RR: 17 (08 May 2023 14:45) (17 - 20)  SpO2: 91% (08 May 2023 14:45) (90% - 94%)    Parameters below as of 08 May 2023 14:45  Patient On (Oxygen Delivery Method): nasal cannula        Lab Results:                        11.7   10.98 )-----------( 275      ( 08 May 2023 05:55 )             35.1     05-08    127<L>  |  88<L>  |  7   ----------------------------<  104<H>  4.3   |  28  |  0.69    Ca    9.1      08 May 2023 05:55  Phos  3.7     05-  Mg     2.20     -    TPro  6.6  /  Alb  3.4  /  TBili  0.2  /  DBili  x   /  AST  24  /  ALT  25  /  AlkPhos  194<H>  05-08    LIVER FUNCTIONS - ( 08 May 2023 05:55 )  Alb: 3.4 g/dL / Pro: 6.6 g/dL / ALK PHOS: 194 U/L / ALT: 25 U/L / AST: 24 U/L / GGT: x             Urinalysis Basic - ( 06 May 2023 21:40 )    Color: Colorless / Appearance: Clear / S.020 / pH: x  Gluc: x / Ketone: Negative  / Bili: Negative / Urobili: <2 mg/dL   Blood: x / Protein: 30 mg/dL / Nitrite: Negative   Leuk Esterase: Negative / RBC: 2 /HPF / WBC 0 /HPF   Sq Epi: x / Non Sq Epi: x / Bacteria: Negative        IMAGING STUDIES:  < from: CT Pelvis w/ IV Cont (23 @ 21:22) >  PROCEDURE:  CT of the Pelvis was performed.  Sagittal and coronal reformats were performed.    FINDINGS:  BLADDER: Opacified with excreted intravenous contrast.  REPRODUCTIVE ORGANS: Prostate within normal limits.  LYMPH NODES: No pelvic lymphadenopathy.    VISUALIZED PORTIONS:  ABDOMINALORGANS: Within normal limits.  BOWEL: No obstruction or inflammation.  PERITONEUM: Trace free pelvic fluid.  VESSELS: Within normal limits.  ABDOMINAL WALL: Moderate subcutaneous inflammatory change and skin   thickening in the right buttocks suggestive of a cellulitis. No   associated rim-enhancing fluid collection to suggest an abscess. No   subcutaneous gas.  BONES: No fracture or dislocation. Degenerative changes of the visualized   lower lumbar spine.    IMPRESSION:  Moderate subcutaneous inflammatory change and skin thickening in the   right buttocks suggestive of a cellulitis. No associated rim-enhancing   fluid collection to suggest an abscess. No subcutaneous gas.    --- End of Report ---          VICENTE GARCIA MD; Resident Radiologist  This document has been electronically signed.  ANGELINE DANIEL MD; Attending Radiologist  This document has been electronically signed. May  6 2023 10:36PM    < end of copied text >

## 2023-05-08 NOTE — CONSULT NOTE ADULT - ASSESSMENT
57 year old male with schizoaffective disorder, HTN, T2DM (A1C 6.3), obesity, chronic bronchiectasis, and CVID on Gammagard S/D 70 grams per month (last infusion 4/13) presenting with R gluteal wound and erythema with purulent drainage, admitted for MRSA bacteremia in the setting of right gluteal cellulitis. IgG came back low at 489. His last infusion was 4/13. Allergy/Immunology consulted for CVID management.    CVID, low IgG   Pt is normally on Gammagard S/D 70 grams monthly. He is 5 days away from needing his infusion and his level is subtherapeutic at 489. Would administer Gammagard S/D 75 grams (570 mg/kg/month).  - Continue broad spectrum abx and appreciate input from dermatology and surgery     Recorded allergies to amoxicillin and penicillin  He tolerated Augmentin in December 2022 without any interim reactions which would suggest that he is not penicillin-allergic. Can address this further if he needs a beta lactam.     Recorded doxycycline allergy   He does not recall any reaction to doxycycline however is a poor historian. If doxycycline is needed would try to obtain additional collateral and avoid if possible.     Case discussed with Dr. Ruth and his outpatient allergist Dr. Mitchell Boxer.     Please feel free to message on MS Teams with any questions or concerns. If after 5PM please page fellow on call.     Nikki Kimura, MD   Fellow, Division of Allergy and Immunology  [employer] at [facility]

## 2023-05-08 NOTE — BH CONSULTATION LIAISON ASSESSMENT NOTE - OTHER PAST PSYCHIATRIC HISTORY (INCLUDE DETAILS REGARDING ONSET, COURSE OF ILLNESS, INPATIENT/OUTPATIENT TREATMENT)
reported hx of schizoaffective d/o, bipolar disorder with no past SA/SIB, hx of multiple psych hospitalizations (last known in [place] 10/2020 - 11/2020), with hx of outpatient psych services at [facility]/ Dr. Hollie Cook

## 2023-05-08 NOTE — BH CONSULTATION LIAISON ASSESSMENT NOTE - NSBHATTESTCOMMENTATTENDFT_PSY_A_CORE
Chart reviewed. Discussed with resident. Appears primitive/concrete, though pleasant and without positive psychotic sx currently. However, has recurrent infections/poor self care and likely some thought disorganization/poor insight. Agree with above assessment/recs and will continue to follow, assessing if he warrants additional antipsychotics.

## 2023-05-08 NOTE — PROGRESS NOTE ADULT - PROBLEM SELECTOR PLAN 1
#Cellulitis c/b Sepsis  Pt with significant right gluteal wound progressive x1 weeks. P/w tachycardia, leukocytsosis c/w sepsis. Has h/o MRSA infection, requiring long course of vancomycin in the past. Historical Derm Bx negative for PG, but if persistent with poor wound healing, may reconsider further evaluation.  hx of left   left gluteal wound requiring debridement  surgery recommends multidisciplinary consults to determine etiology prior to debridement   BCX: Growing MRSA   UCx negative   - F/u Derm consult  - F/u ID consult   - F/u Immunology consult  - F/u surgery recs    - c/W Vancomycin (5/7-)  - d/c Cefepime 2g Q12H (5/7-)  -repeat Blood cultures 5/10 #Cellulitis c/b Sepsis  Pt with significant right gluteal wound progressive x1 weeks. P/w tachycardia, leukocytsosis c/w sepsis. Has h/o MRSA infection, requiring long course of vancomycin in the past. Historical Derm Bx negative for PG, but if persistent with poor wound healing, may reconsider further evaluation.  hx of left   left gluteal wound requiring debridement  surgery recommends multidisciplinary consults to determine etiology prior to debridement   BCX: Growing MRSA   UCx negative   - F/u Derm consult  - F/u ID consult   - F/u Immunology consult  - F/u surgery recs    - f/u wound care   - c/W Vancomycin (5/7-)  - d/c Cefepime 2g Q12H (5/7-)  -repeat Blood cultures 5/10

## 2023-05-08 NOTE — BH CONSULTATION LIAISON ASSESSMENT NOTE - CURRENT MEDICATION
MEDICATIONS  (STANDING):  atorvastatin 40 milliGRAM(s) Oral at bedtime  budesonide 160 MICROgram(s)/formoterol 4.5 MICROgram(s) Inhaler 2 Puff(s) Inhalation two times a day  cholecalciferol 2000 Unit(s) Oral daily  dextrose 5%. 1000 milliLiter(s) (100 mL/Hr) IV Continuous <Continuous>  dextrose 5%. 1000 milliLiter(s) (50 mL/Hr) IV Continuous <Continuous>  dextrose 50% Injectable 25 Gram(s) IV Push once  dextrose 50% Injectable 25 Gram(s) IV Push once  dextrose 50% Injectable 12.5 Gram(s) IV Push once  diltiazem    milliGRAM(s) Oral daily  enoxaparin Injectable 40 milliGRAM(s) SubCutaneous every 24 hours  glucagon  Injectable 1 milliGRAM(s) IntraMuscular once  insulin lispro (ADMELOG) corrective regimen sliding scale   SubCutaneous three times a day before meals  insulin lispro (ADMELOG) corrective regimen sliding scale   SubCutaneous at bedtime  losartan 100 milliGRAM(s) Oral daily  vancomycin  IVPB 1250 milliGRAM(s) IV Intermittent every 8 hours    MEDICATIONS  (PRN):  acetaminophen     Tablet .. 975 milliGRAM(s) Oral every 6 hours PRN Temp greater or equal to 38C (100.4F), Mild Pain (1 - 3), Moderate Pain (4 - 6), Severe Pain (7 - 10)  albuterol    90 MICROgram(s) HFA Inhaler 2 Puff(s) Inhalation every 6 hours PRN Shortness of Breath and/or Wheezing  dextrose Oral Gel 15 Gram(s) Oral once PRN Blood Glucose LESS THAN 70 milliGRAM(s)/deciliter  hydrOXYzine hydrochloride 50 milliGRAM(s) Oral at bedtime PRN Anxiety  oxyCODONE    IR 5 milliGRAM(s) Oral every 4 hours PRN Moderate Pain (4 - 6)

## 2023-05-08 NOTE — CONSULT NOTE ADULT - SUBJECTIVE AND OBJECTIVE BOX
HPI:  Patient is a 57 year old M hx of bipolar, chronic bronchiectasis, schizophrenia, hypogammaglobulinemia (monthly IVIG), common variable immunodeficiency, HTN, who presents to the ER w/ right gluteal wound with purulent drainage.     Dermatology consulted for evaluation of this lesion. Patient said it has been present for a few weeks. He was hospitalized recently for MRSA infection and s/p vancomycin extended duration. Patient left AMA from [facility] 3 days ago and was on clindamycin with worsening symptoms. He denies fevers, chills, nausea, vomiting, diarrhea, sob, headaches, visual changes, LE swelling, dysuria, polyuria, melena or hematochezia. Has left rib pain on inspiration. Denies hx of Crohn's or IBD. Pain is well controlled.  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         PAST MEDICAL & SURGICAL HISTORY:  Smoker    DM (diabetes mellitus)      HTN (hypertension)      Abscess of finger      Bipolar disorder      Chronic hyponatremia      CVID (common variable immunodeficiency)      Hyponatremia      Deviated septum      Loss of teeth due to extraction  All teeth due to dental carries          REVIEW OF SYSTEMS  General: denies f/c	  Skin/Breast: rash  Ophthalmologic: denies vision changes  ENMT:denies hearing changes, oral lesions  Respiratory and Thorax: denies worsening SOB or difficulty breathing  Cardiovascular:denies CP  Gastrointestinal:denies abd pain, n/v/d  Genitourinary: denies dysuria, hematuria  Musculoskeletal: denies joint pains, muscle aches  Neurological: denies HA, numbness, tingling    MEDICATIONS  (STANDING):  atorvastatin 40 milliGRAM(s) Oral at bedtime  budesonide 160 MICROgram(s)/formoterol 4.5 MICROgram(s) Inhaler 2 Puff(s) Inhalation two times a day  cholecalciferol 2000 Unit(s) Oral daily  dextrose 5%. 1000 milliLiter(s) (50 mL/Hr) IV Continuous <Continuous>  dextrose 5%. 1000 milliLiter(s) (100 mL/Hr) IV Continuous <Continuous>  dextrose 50% Injectable 25 Gram(s) IV Push once  dextrose 50% Injectable 25 Gram(s) IV Push once  dextrose 50% Injectable 12.5 Gram(s) IV Push once  diltiazem    milliGRAM(s) Oral daily  enoxaparin Injectable 40 milliGRAM(s) SubCutaneous every 24 hours  glucagon  Injectable 1 milliGRAM(s) IntraMuscular once  immune   globulin 10% (GAMMAGARD) IVPB 75 Gram(s) IV Intermittent once  insulin lispro (ADMELOG) corrective regimen sliding scale   SubCutaneous at bedtime  insulin lispro (ADMELOG) corrective regimen sliding scale   SubCutaneous three times a day before meals  losartan 100 milliGRAM(s) Oral daily  vancomycin  IVPB 1250 milliGRAM(s) IV Intermittent every 8 hours    MEDICATIONS  (PRN):  acetaminophen     Tablet .. 975 milliGRAM(s) Oral every 6 hours PRN Temp greater or equal to 38C (100.4F), Mild Pain (1 - 3), Moderate Pain (4 - 6), Severe Pain (7 - 10)  albuterol    90 MICROgram(s) HFA Inhaler 2 Puff(s) Inhalation every 6 hours PRN Shortness of Breath and/or Wheezing  dextrose Oral Gel 15 Gram(s) Oral once PRN Blood Glucose LESS THAN 70 milliGRAM(s)/deciliter  hydrOXYzine hydrochloride 50 milliGRAM(s) Oral at bedtime PRN Anxiety  oxyCODONE    IR 5 milliGRAM(s) Oral every 4 hours PRN Moderate Pain (4 - 6)      Allergies    amoxicillin (Fever)  penicillin (Rash)    Intolerances        SOCIAL HISTORY: + tobacco use, denies EtOH use    FAMILY HISTORY:  Family history of throat cancer (Father)    Family history of leukemia (Mother)        Vital Signs Last 24 Hrs  T(C): 36.6 (08 May 2023 14:45), Max: 37.3 (07 May 2023 22:29)  T(F): 97.9 (08 May 2023 14:45), Max: 99.1 (07 May 2023 22:29)  HR: 85 (08 May 2023 14:45) (85 - 101)  BP: 142/83 (08 May 2023 14:45) (142/83 - 186/97)  BP(mean): --  RR: 17 (08 May 2023 14:45) (17 - 20)  SpO2: 91% (08 May 2023 14:45) (90% - 94%)    Parameters below as of 08 May 2023 14:45  Patient On (Oxygen Delivery Method): nasal cannula        PHYSICAL EXAM:  The patient was alert and oriented X 3, well nourished, and in no apparent distress. Oropharynx showed no ulcerations. There was no visible lymphadenopathy. Conjunctiva were non-injected. There was no clubbing or edema of extremities. The scalp, hair, face, eyebrows, lips, oropharynx , neck, chest, back, buttocks, extremities X 4, hands, feet, nails were examined. There was no hyperhidrosis or bromhidrosis.   The following lesions are noted:    L lower leg with crusted plaque with surrounding purple-brown hyperpigmentation  R buttock with large fluctuant, indurated and erythematous nodule with central areas of erosion and purulent drainage        LABS:                        11.7   10.98 )-----------( 275      ( 08 May 2023 05:55 )             35.1     05-08    127<L>  |  88<L>  |  7   ----------------------------<  104<H>  4.3   |  28  |  0.69    Ca    9.1      08 May 2023 05:55  Phos  3.7     05-08  Mg     2.20     05-08    TPro  6.6  /  Alb  3.4  /  TBili  0.2  /  DBili  x   /  AST  24  /  ALT  25  /  AlkPhos  194<H>  05-08      Urinalysis Basic - ( 06 May 2023 21:40 )    Color: Colorless / Appearance: Clear / S.020 / pH: x  Gluc: x / Ketone: Negative  / Bili: Negative / Urobili: <2 mg/dL   Blood: x / Protein: 30 mg/dL / Nitrite: Negative   Leuk Esterase: Negative / RBC: 2 /HPF / WBC 0 /HPF   Sq Epi: x / Non Sq Epi: x / Bacteria: Negative        Culture - Urine (collected 06 May 2023 21:40)  Source: Clean Catch Clean Catch (Midstream)  Final Report (07 May 2023 21:09):    No growth    Culture - Blood (collected 06 May 2023 14:50)  Source: .Blood Blood-Peripheral  Gram Stain (07 May 2023 17:23):    Growth in aerobic bottle: Gram Positive Cocci in Clusters  Preliminary Report (08 May 2023 12:17):    Growth in aerobic bottle: Staphylococcus aureus    ***Blood Panel PCR results on this specimen are available    approximately 3 hours after the Gram stain result.***    Gram stain, PCR, and/or culture results may not always    correspond due to difference in methodologies.    ************************************************************    This PCR assay was performed by multiplex PCR. This    Assay tests for 66 bacterial and resistance gene targets.    Please refer to the [facility] test directory    at [email or web address] for details.  Organism: Blood Culture PCR (07 May 2023 18:50)  Organism: Blood Culture PCR (07 May 2023 18:50)    Culture - Blood (collected 06 May 2023 14:40)  Source: .Blood Blood-Peripheral  Preliminary Report (07 May 2023 19:02):    No growth to date.        RADIOLOGY & ADDITIONAL STUDIES:  CT pelvis:  IMPRESSION:  Moderate subcutaneous inflammatory change and skin thickening in the   right buttocks suggestive of a cellulitis. No associated rim-enhancing   fluid collection to suggest an abscess. No subcutaneous gas.

## 2023-05-08 NOTE — CONSULT NOTE ADULT - SUBJECTIVE AND OBJECTIVE BOX
Patient is a 57y old  Male who presents with a chief complaint of     HPI:  57M with bipolar, chronic bronchiectasis, schizophrenia, hypogammaglobulinemia (monthly IVIG), common variable immunodeficiency, HTN presents () with R gluteal wound and erythema with pus, admitted for R Gluteal Cellulitis, course complicated by MRSA Bacteremia, ID consulted for assistance.    Patient recently AMA from [facility] 3 days ago for MRSA infection, and has since had symptoms worsening.   He denies fevers, chills, nausea, vomiting, diarrhea, sob, headaches, visual changes, LE swelling, dysuria, polyuria, melena or hematochezia.     Patient was also admitted in 3/29/2023 for delusions and LLE cellulitis, noted to have MRSA Bacteremia (3/12) from [facility] ([facility]), followed by ID, Source MRSA bacteremia possible from buttock abscess/wound and or LLE cellulitis since Wound culture with MRSA. Blood culture (3/15) clear. TTE was obtain which was reassuring. Patient completed Vancomycin till 2023.            PAST MEDICAL & SURGICAL HISTORY:  Smoker      DM (diabetes mellitus)      HTN (hypertension)      Abscess of finger      Bipolar disorder      Chronic hyponatremia      CVID (common variable immunodeficiency)      Hyponatremia      Deviated septum      Loss of teeth due to extraction  All teeth due to dental carries          Allergies  amoxicillin (Fever)  penicillin (Rash)    ANTIMICROBIALS (past 90 days)  MEDICATIONS  (STANDING):    cefepime   IVPB 2000 every 12 hours ( ---> )  vancomycin  IVPB 1250 every 8 hours ( ---> )    MEDICATIONS  (STANDING):  acetaminophen     Tablet .. 975 every 6 hours PRN  albuterol    90 MICROgram(s) HFA Inhaler 2 every 6 hours PRN  atorvastatin 40 at bedtime  budesonide 160 MICROgram(s)/formoterol 4.5 MICROgram(s) Inhaler 2 two times a day  dextrose 50% Injectable 25 once  dextrose 50% Injectable 12.5 once  dextrose 50% Injectable 25 once  dextrose Oral Gel 15 once PRN  diltiazem    daily  enoxaparin Injectable 40 every 24 hours  glucagon  Injectable 1 once  hydrOXYzine hydrochloride 50 at bedtime PRN  insulin lispro (ADMELOG) corrective regimen sliding scale  three times a day before meals  insulin lispro (ADMELOG) corrective regimen sliding scale  at bedtime  losartan 100 daily  oxyCODONE    IR 5 every 4 hours PRN    SOCIAL HISTORY:       FAMILY HISTORY:  Family history of throat cancer (Father)    Family history of leukemia (Mother)      REVIEW OF SYSTEMS  [  ] ROS unobtainable because:    [  ] All other systems negative except as noted below:	    Constitutional:  [ ] fever [ ] chills  [ ] weight loss  [ ] weakness  Skin:  [ ] rash [ ] phlebitis	  Eyes: [ ] icterus [ ] pain  [ ] discharge	  ENMT: [ ] sore throat  [ ] thrush [ ] ulcers [ ] exudates  Respiratory: [ ] dyspnea [ ] hemoptysis [ ] cough [ ] sputum	  Cardiovascular:  [ ] chest pain [ ] palpitations [ ] edema	  Gastrointestinal:  [ ] nausea [ ] vomiting [ ] diarrhea [ ] constipation [ ] pain	  Genitourinary:  [ ] dysuria [ ] frequency [ ] hematuria [ ] discharge [ ] flank pain  [ ] incontinence  Musculoskeletal:  [ ] myalgias [ ] arthralgias [ ] arthritis  [ ] back pain  Neurological:  [ ] headache [ ] seizures  [ ] confusion/altered mental status  Psychiatric:  [ ] anxiety [ ] depression	  Hematology/Lymphatics:  [ ] lymphadenopathy  Endocrine:  [ ] adrenal [ ] thyroid  Allergic/Immunologic:	 [ ] transplant [ ] seasonal    Vital Signs Last 24 Hrs  T(F): 98.2 (23 @ 05:45), Max: 99.1 (23 @ 22:29)  Vital Signs Last 24 Hrs  HR: 99 (23 @ 05:45) (95 - 101)  BP: 163/89 (23 @ 05:45) (145/77 - 186/97)  RR: 20 (23 @ 05:45)  SpO2: 90% (23 @ 05:45) (90% - 94%)  Wt(kg): --    Physical Exam:  Constitutional:  well preserved, comfortable  Head/Eyes: no icterus  ENT:  supple, no cervical lymphadenopathy   LUNGS:  CTA  CVS:  regular rhythm, no murmur  Abd:  soft, non-tender; non-distended  Ext:  no edema  Vascular:  IV site no erythema tenderness or discharge  MSK:  joints without swelling  Neuro: AAO X 3, non- focal                              11.7   10.98 )-----------( 275      ( 08 May 2023 05:55 )             35.1       127<L>  |  88<L>  |  7   ----------------------------<  104<H>  4.3   |  28  |  0.69    Ca    9.1      08 May 2023 05:55  Phos  3.7     -  Mg     2.20         TPro  6.6  /  Alb  3.4  /  TBili  0.2  /  DBili  x   /  AST  24  /  ALT  25  /  AlkPhos  194<H>      Urinalysis Basic - ( 06 May 2023 21:40 )    Color: Colorless / Appearance: Clear / S.020 / pH: x  Gluc: x / Ketone: Negative  / Bili: Negative / Urobili: <2 mg/dL   Blood: x / Protein: 30 mg/dL / Nitrite: Negative   Leuk Esterase: Negative / RBC: 2 /HPF / WBC 0 /HPF   Sq Epi: x / Non Sq Epi: x / Bacteria: Negative    MICROBIOLOGY:  Culture - Urine (collected 06 May 2023 21:40)  Source: Clean Catch Clean Catch (Midstream)  Final Report (07 May 2023 21:09):    No growth    Culture - Blood (collected 06 May 2023 14:50)  Source: .Blood Blood-Peripheral  Gram Stain (07 May 2023 17:23):    Growth in aerobic bottle: Gram Positive Cocci in Clusters  Preliminary Report (08 May 2023 12:17):    Growth in aerobic bottle: Staphylococcus aureus    ***Blood Panel PCR results on this specimen are available    approximately 3 hours after the Gram stain result.***    Gram stain, PCR, and/or culture results may not always    correspond due to difference in methodologies.    ************************************************************    This PCR assay was performed by multiplex PCR. This    Assay tests for 66 bacterial and resistance gene targets.    Please refer to the [facility] test directory    at [email or web address] for details.  Organism: Blood Culture PCR (07 May 2023 18:50)  Organism: Blood Culture PCR (07 May 2023 18:50)      Method Type: PCR      -  Methicillin resistant Staphylococcus aureus (MRSA): Detec    Culture - Blood (collected 06 May 2023 14:40)  Source: .Blood Blood-Peripheral  Preliminary Report (07 May 2023 19:02):    No growth to date.          RADIOLOGY:  imaging below personally reviewed and agree with findings  < from: CT Pelvis w/ IV Cont (23 @ 21:22) >  IMPRESSION:  Moderate subcutaneous inflammatory change and skin thickening in the   right buttocks suggestive of a cellulitis. No associated rim-enhancing   fluid collection to suggest an abscess. No subcutaneous gas.    < end of copied text >  < from: CT Abdomen and Pelvis w/ IV Cont (23 @ 17:28) >    IMPRESSION:    Subcutaneous opacification in the right buttock could be due to   infection/cellulitis, partially included in the images. Portion of right   buttock is out of field of view. No soft tissue tissue gas is seen. No   drainable fluid collection is evident.    < end of copied text >  < from: Xray Pelvis 2 views (23 @ 15:07) >    IMPRESSION:    Evaluation is markedly limited due to patient body habitus and decreased   penetration. No definite displaced fracture seen. Hip joint spaces appear   normal.    < end of copied text >   Patient is a 57y old  Male who presents with a chief complaint of     HPI:  57M with bipolar, chronic bronchiectasis, schizophrenia, hypogammaglobulinemia (monthly IVIG), common variable immunodeficiency, HTN presents () with R gluteal wound and erythema with pus, admitted for R Gluteal Cellulitis, course complicated by MRSA Bacteremia, ID consulted for assistance.    Patient recently AMA from [facility] 3 days ago for MRSA infection, and has since had symptoms worsening.   He denies fevers, chills, nausea, vomiting, diarrhea, sob, headaches, visual changes, LE swelling, dysuria, polyuria, melena or hematochezia.     Patient was also admitted in 3/29/2023 for delusions and LLE cellulitis, noted to have MRSA Bacteremia (3/12) from [facility] ([facility]), followed by ID, Source MRSA bacteremia possible from buttock abscess/wound and or LLE cellulitis since Wound culture with MRSA. Blood culture (3/15) clear. TTE was obtain which was reassuring. Patient completed Vancomycin till 2023.      Reports that R buttocks pain is somewhat better  Denies any fever or chills  Denies any cardiac device, hardware, or prosthetic joint replacement    WBC 10  Cr 0.69  UA negative  CT AP with subQ opacification of R buttock c/f cellulitis no gas or abscess  BCx () MRSA 1 out of 4  HIV negative  LLE Cx (3/12) with MRSA      PAST MEDICAL & SURGICAL HISTORY:  Smoker      DM (diabetes mellitus)      HTN (hypertension)      Abscess of finger      Bipolar disorder      Chronic hyponatremia      CVID (common variable immunodeficiency)      Hyponatremia      Deviated septum      Loss of teeth due to extraction  All teeth due to dental carries          Allergies  amoxicillin (Fever)  penicillin (Rash)    ANTIMICROBIALS (past 90 days)  MEDICATIONS  (STANDING):    cefepime   IVPB 2000 every 12 hours ( ---> )  vancomycin  IVPB 1250 every 8 hours ( ---> )    MEDICATIONS  (STANDING):  acetaminophen     Tablet .. 975 every 6 hours PRN  albuterol    90 MICROgram(s) HFA Inhaler 2 every 6 hours PRN  atorvastatin 40 at bedtime  budesonide 160 MICROgram(s)/formoterol 4.5 MICROgram(s) Inhaler 2 two times a day  dextrose 50% Injectable 25 once  dextrose 50% Injectable 12.5 once  dextrose 50% Injectable 25 once  dextrose Oral Gel 15 once PRN  diltiazem    daily  enoxaparin Injectable 40 every 24 hours  glucagon  Injectable 1 once  hydrOXYzine hydrochloride 50 at bedtime PRN  insulin lispro (ADMELOG) corrective regimen sliding scale  three times a day before meals  insulin lispro (ADMELOG) corrective regimen sliding scale  at bedtime  losartan 100 daily  oxyCODONE    IR 5 every 4 hours PRN    SOCIAL HISTORY:   Admits to tobacco smoking, Denies alcohol, recreational drug use      FAMILY HISTORY:  Family history of throat cancer (Father)    Family history of leukemia (Mother)      REVIEW OF SYSTEMS  [  ] ROS unobtainable because:    [x  ] All other systems negative except as noted below:	    Constitutional:  [ ] fever [ ] chills  [ ] weight loss  [ ] weakness  Skin:  [ ] rash [ ] phlebitis [x] R buttock pain and redness	  Eyes: [ ] icterus [ ] pain  [ ] discharge	  ENMT: [ ] sore throat  [ ] thrush [ ] ulcers [ ] exudates  Respiratory: [ ] dyspnea [ ] hemoptysis [ ] cough [ ] sputum	  Cardiovascular:  [ ] chest pain [ ] palpitations [ ] edema	  Gastrointestinal:  [ ] nausea [ ] vomiting [ ] diarrhea [ ] constipation [ ] pain	  Genitourinary:  [ ] dysuria [ ] frequency [ ] hematuria [ ] discharge [ ] flank pain  [ ] incontinence  Musculoskeletal:  [ ] myalgias [ ] arthralgias [ ] arthritis  [ ] back pain  Neurological:  [ ] headache [ ] seizures  [ ] confusion/altered mental status  Psychiatric:  [ ] anxiety [ ] depression	  Hematology/Lymphatics:  [ ] lymphadenopathy  Endocrine:  [ ] adrenal [ ] thyroid  Allergic/Immunologic:	 [ ] transplant [ ] seasonal    Vital Signs Last 24 Hrs  T(F): 98.2 (23 @ 05:45), Max: 99.1 (23 @ 22:29)  Vital Signs Last 24 Hrs  HR: 99 (23 @ 05:45) (95 - 101)  BP: 163/89 (23 @ 05:45) (145/77 - 186/97)  RR: 20 (23 @ 05:45)  SpO2: 90% (23 @ 05:45) (90% - 94%)  Wt(kg): --    Physical Exam:  Constitutional:  well preserved, comfortable  Head/Eyes: no icterus  ENT:  supple, no cervical lymphadenopathy   LUNGS:  CTA  CVS:  regular rhythm, no murmur  Abd:  soft, non-tender; non-distended  Buttock: +R buttock wound with periwound erythema, purulent drainage, TTP  Ext:  +LLE wound necrotic, no erythema nonTTP  Vascular:  IV site no erythema tenderness or discharge  MSK:  joints without swelling  Neuro: AAO X 3, non- focal                              11.7   10.98 )-----------( 275      ( 08 May 2023 05:55 )             35.1   05-08    127<L>  |  88<L>  |  7   ----------------------------<  104<H>  4.3   |  28  |  0.69    Ca    9.1      08 May 2023 05:55  Phos  3.7     05-08  Mg     2.20     05-08    TPro  6.6  /  Alb  3.4  /  TBili  0.2  /  DBili  x   /  AST  24  /  ALT  25  /  AlkPhos  194<H>  05-08    Urinalysis Basic - ( 06 May 2023 21:40 )    Color: Colorless / Appearance: Clear / S.020 / pH: x  Gluc: x / Ketone: Negative  / Bili: Negative / Urobili: <2 mg/dL   Blood: x / Protein: 30 mg/dL / Nitrite: Negative   Leuk Esterase: Negative / RBC: 2 /HPF / WBC 0 /HPF   Sq Epi: x / Non Sq Epi: x / Bacteria: Negative    MICROBIOLOGY:  Culture - Urine (collected 06 May 2023 21:40)  Source: Clean Catch Clean Catch (Midstream)  Final Report (07 May 2023 21:09):    No growth    Culture - Blood (collected 06 May 2023 14:50)  Source: .Blood Blood-Peripheral  Gram Stain (07 May 2023 17:23):    Growth in aerobic bottle: Gram Positive Cocci in Clusters  Preliminary Report (08 May 2023 12:17):    Growth in aerobic bottle: Staphylococcus aureus    ***Blood Panel PCR results on this specimen are available    approximately 3 hours after the Gram stain result.***    Gram stain, PCR, and/or culture results may not always    correspond due to difference in methodologies.    ************************************************************    This PCR assay was performed by multiplex PCR. This    Assay tests for 66 bacterial and resistance gene targets.    Please refer to the [facility] test directory    at [email or web address] for details.  Organism: Blood Culture PCR (07 May 2023 18:50)  Organism: Blood Culture PCR (07 May 2023 18:50)      Method Type: PCR      -  Methicillin resistant Staphylococcus aureus (MRSA): Detec    Culture - Blood (collected 06 May 2023 14:40)  Source: .Blood Blood-Peripheral  Preliminary Report (07 May 2023 19:02):    No growth to date.          RADIOLOGY:  imaging below personally reviewed and agree with findings  < from: CT Pelvis w/ IV Cont (23 @ 21:22) >  IMPRESSION:  Moderate subcutaneous inflammatory change and skin thickening in the   right buttocks suggestive of a cellulitis. No associated rim-enhancing   fluid collection to suggest an abscess. No subcutaneous gas.    < end of copied text >  < from: CT Abdomen and Pelvis w/ IV Cont (23 @ 17:28) >    IMPRESSION:    Subcutaneous opacification in the right buttock could be due to   infection/cellulitis, partially included in the images. Portion of right   buttock is out of field of view. No soft tissue tissue gas is seen. No   drainable fluid collection is evident.    < end of copied text >  < from: Xray Pelvis 2 views (23 @ 15:07) >    IMPRESSION:    Evaluation is markedly limited due to patient body habitus and decreased   penetration. No definite displaced fracture seen. Hip joint spaces appear   normal.    < end of copied text >   Patient is a 57y old  Male who presents with a chief complaint of     HPI:  57M with bipolar, chronic bronchiectasis, schizophrenia, hypogammaglobulinemia (monthly IVIG), common variable immunodeficiency, HTN presents () with R gluteal wound and erythema with pus, admitted for R Gluteal Cellulitis, course complicated by MRSA Bacteremia, ID consulted for assistance.    Patient recently AMA from [facility] 3 days ago for MRSA infection, and has since had symptoms worsening.   He denies fevers, chills, nausea, vomiting, diarrhea, sob, headaches, visual changes, LE swelling, dysuria, polyuria, melena or hematochezia.     Patient was also admitted in 3/29/2023 for delusions and LLE cellulitis, noted to have MRSA Bacteremia (3/12) from [facility] ([facility]), followed by ID, Source MRSA bacteremia possible from buttock abscess/wound and or LLE cellulitis since Wound culture with MRSA. Blood culture (3/15) clear. TTE was obtain which was reassuring. Patient completed Vancomycin till 2023.      Reports that R buttocks pain is somewhat better  Denies any fever or chills  Denies any cardiac device, hardware, or prosthetic joint replacement    WBC 10  Cr 0.69  UA negative  CT AP with subQ opacification of R buttock c/f cellulitis no gas or abscess  BCx () MRSA 1 out of 4  HIV negative  LLE Cx (3/12) with MRSA      PAST MEDICAL & SURGICAL HISTORY:  Smoker      DM (diabetes mellitus)      HTN (hypertension)      Abscess of finger      Bipolar disorder      Chronic hyponatremia      CVID (common variable immunodeficiency)      Hyponatremia      Deviated septum      Loss of teeth due to extraction  All teeth due to dental carries          Allergies  amoxicillin (Fever)  penicillin (Rash)    ANTIMICROBIALS (past 90 days)  MEDICATIONS  (STANDING):    cefepime   IVPB 2000 every 12 hours ( ---> )  vancomycin  IVPB 1250 every 8 hours ( ---> )    MEDICATIONS  (STANDING):  acetaminophen     Tablet .. 975 every 6 hours PRN  albuterol    90 MICROgram(s) HFA Inhaler 2 every 6 hours PRN  atorvastatin 40 at bedtime  budesonide 160 MICROgram(s)/formoterol 4.5 MICROgram(s) Inhaler 2 two times a day  dextrose 50% Injectable 25 once  dextrose 50% Injectable 12.5 once  dextrose 50% Injectable 25 once  dextrose Oral Gel 15 once PRN  diltiazem    daily  enoxaparin Injectable 40 every 24 hours  glucagon  Injectable 1 once  hydrOXYzine hydrochloride 50 at bedtime PRN  insulin lispro (ADMELOG) corrective regimen sliding scale  three times a day before meals  insulin lispro (ADMELOG) corrective regimen sliding scale  at bedtime  losartan 100 daily  oxyCODONE    IR 5 every 4 hours PRN    SOCIAL HISTORY:   Admits to tobacco smoking, Denies alcohol, recreational drug use      FAMILY HISTORY:  Family history of throat cancer (Father)    Family history of leukemia (Mother)      REVIEW OF SYSTEMS  [  ] ROS unobtainable because:    [x  ] All other systems negative except as noted below:	    Constitutional:  [ ] fever [ ] chills  [ ] weight loss  [ ] weakness  Skin:  [ ] rash [ ] phlebitis [x] R buttock pain and redness	  Eyes: [ ] icterus [ ] pain  [ ] discharge	  ENMT: [ ] sore throat  [ ] thrush [ ] ulcers [ ] exudates  Respiratory: [ ] dyspnea [ ] hemoptysis [ ] cough [ ] sputum	  Cardiovascular:  [ ] chest pain [ ] palpitations [ ] edema	  Gastrointestinal:  [ ] nausea [ ] vomiting [ ] diarrhea [ ] constipation [ ] pain	  Genitourinary:  [ ] dysuria [ ] frequency [ ] hematuria [ ] discharge [ ] flank pain  [ ] incontinence  Musculoskeletal:  [ ] myalgias [ ] arthralgias [ ] arthritis  [ ] back pain  Neurological:  [ ] headache [ ] seizures  [ ] confusion/altered mental status  Psychiatric:  [ ] anxiety [ ] depression	  Hematology/Lymphatics:  [ ] lymphadenopathy  Endocrine:  [ ] adrenal [ ] thyroid  Allergic/Immunologic:	 [ ] transplant [ ] seasonal    Vital Signs Last 24 Hrs  T(F): 98.2 (23 @ 05:45), Max: 99.1 (23 @ 22:29)  Vital Signs Last 24 Hrs  HR: 99 (23 @ 05:45) (95 - 101)  BP: 163/89 (23 @ 05:45) (145/77 - 186/97)  RR: 20 (23 @ 05:45)  SpO2: 90% (23 @ 05:45) (90% - 94%)  Wt(kg): --    Physical Exam:  Constitutional:  NAD, non toxic  Head: atraumatic, normocephalic  Eyes: no icterus  ENT:  no teeth  LUNGS:  CTA  CVS:  regular rhythm  Abd:  soft, non-tender; non-distended, LUQ tenderness  : no suprapubic or CVA tenderness  skin: +R buttock wound with periwound erythema, fluotracen purulent drainage, TTP  Ext:  +LLE wound necrotic, no erythema nonTTP  Vascular:  IV site no erythema tenderness or discharge  MSK:  joints without swelling  Neuro: AAO X 3, non- focal  psych: normal affect                              11.7   10.98 )-----------( 275      ( 08 May 2023 05:55 )             35.1   05-08    127<L>  |  88<L>  |  7   ----------------------------<  104<H>  4.3   |  28  |  0.69    Ca    9.1      08 May 2023 05:55  Phos  3.7     05-08  Mg     2.20     05-08    TPro  6.6  /  Alb  3.4  /  TBili  0.2  /  DBili  x   /  AST  24  /  ALT  25  /  AlkPhos  194<H>  05-08    Urinalysis Basic - ( 06 May 2023 21:40 )    Color: Colorless / Appearance: Clear / S.020 / pH: x  Gluc: x / Ketone: Negative  / Bili: Negative / Urobili: <2 mg/dL   Blood: x / Protein: 30 mg/dL / Nitrite: Negative   Leuk Esterase: Negative / RBC: 2 /HPF / WBC 0 /HPF   Sq Epi: x / Non Sq Epi: x / Bacteria: Negative    MICROBIOLOGY:  Culture - Urine (collected 06 May 2023 21:40)  Source: Clean Catch Clean Catch (Midstream)  Final Report (07 May 2023 21:09):    No growth    Culture - Blood (collected 06 May 2023 14:50)  Source: .Blood Blood-Peripheral  Gram Stain (07 May 2023 17:23):    Growth in aerobic bottle: Gram Positive Cocci in Clusters  Preliminary Report (08 May 2023 12:17):    Growth in aerobic bottle: Staphylococcus aureus    ***Blood Panel PCR results on this specimen are available    approximately 3 hours after the Gram stain result.***    Gram stain, PCR, and/or culture results may not always    correspond due to difference in methodologies.    ************************************************************    This PCR assay was performed by multiplex PCR. This    Assay tests for 66 bacterial and resistance gene targets.    Please refer to the [facility] test directory    at [email or web address] for details.  Organism: Blood Culture PCR (07 May 2023 18:50)  Organism: Blood Culture PCR (07 May 2023 18:50)      Method Type: PCR      -  Methicillin resistant Staphylococcus aureus (MRSA): Detec    Culture - Blood (collected 06 May 2023 14:40)  Source: .Blood Blood-Peripheral  Preliminary Report (07 May 2023 19:02):    No growth to date.          RADIOLOGY:  imaging below personally reviewed and agree with findings  < from: CT Pelvis w/ IV Cont (23 @ 21:22) >  IMPRESSION:  Moderate subcutaneous inflammatory change and skin thickening in the   right buttocks suggestive of a cellulitis. No associated rim-enhancing   fluid collection to suggest an abscess. No subcutaneous gas.    < end of copied text >  < from: CT Abdomen and Pelvis w/ IV Cont (23 @ 17:28) >    IMPRESSION:    Subcutaneous opacification in the right buttock could be due to   infection/cellulitis, partially included in the images. Portion of right   buttock is out of field of view. No soft tissue tissue gas is seen. No   drainable fluid collection is evident.    < end of copied text >  < from: Xray Pelvis 2 views (23 @ 15:07) >    IMPRESSION:    Evaluation is markedly limited due to patient body habitus and decreased   penetration. No definite displaced fracture seen. Hip joint spaces appear   normal.    < end of copied text >

## 2023-05-08 NOTE — PROGRESS NOTE ADULT - PROBLEM SELECTOR PLAN 6
Pt with known bronchiectasis based on CT chest in 2017. Pt denies having an OP Pulmonologist.   currently asymptomatic   -ctm Pt with known bronchiectasis based on CT chest in 2017. Pt denies having an OP Pulmonologist.   currently asymptomatic   -ctm  -incentive spirometry

## 2023-05-08 NOTE — BH CONSULTATION LIAISON ASSESSMENT NOTE - NSBHCHARTREVIEWVS_PSY_A_CORE FT
Vital Signs Last 24 Hrs  T(C): 36.6 (08 May 2023 14:45), Max: 37.3 (07 May 2023 22:29)  T(F): 97.9 (08 May 2023 14:45), Max: 99.1 (07 May 2023 22:29)  HR: 85 (08 May 2023 14:45) (85 - 101)  BP: 142/83 (08 May 2023 14:45) (142/83 - 186/97)  BP(mean): --  RR: 17 (08 May 2023 14:45) (17 - 20)  SpO2: 91% (08 May 2023 14:45) (90% - 94%)    Parameters below as of 08 May 2023 14:45  Patient On (Oxygen Delivery Method): nasal cannula

## 2023-05-08 NOTE — CONSULT NOTE ADULT - ATTENDING COMMENTS
Nodule on the buttock consistent with abscess and overlying cellulitis. Clinical exam not consistent with pyoderma gangrenosum. Agree with ID re need for I&D for source control. Source control with medical management of an abscess may be particularly difficult to achieve in the setting of CVID. This is a likely source for his MRSA bacteremia.     Sergio Shah MD, PharmD, MPH  Co-Director, Inpatient Dermatology Consultation Service, [facility]
I have reviewed the history, pertinent labs and imaging, and discussed the care with the consult resident.  More than 50% of this 50  minute encounter including face to face with the patient was spent counseling and/or coordination of care on poorly healing chronic gluteal wound with infection.    Chief complaint:  chronically draining wound    The active issues are:  1. chronically infected wound in patient with risk factors-smoker, DM2, CVID    Local wound care, antibiotics, glycemic management and nutritional optimization.  Debridement will result in larger wound unless we address underlying risk factors.  Suggest the measures mentioned and consultation of heme/onc service +/- dermatology for multidisciplinary approach prior to surgical intervention.  We will follow.    The Acute Care Surgery (B Team) Practice:    urgent issues - spectra [id number]  nonurgent issues - [phone]  patient appointments or afterhours - [phone]
57 m with bipolar, schizophrenia, HTN,  bronchiectasis, CVID, hypogammaglobulinemia (monthly IVIG), previous LLE cellulitis/abscess and MRSA bacteremia, went to [facility] for R buttock wound, cellulitis but signed out AMA and was given clinda now p/w worsening pain/edema  here afebrile, WBC: 15  abd/pelvis CT: buttock cellulitis, no abscess seen  blood cx: MRSA    leukocytosis, MRSA bacteremia due to buttock cellulitis and abscess in the setting of CVID, on monthly IVIG and previous MRSA bacteremia with abscess    * f/u with surgery for I&D  * c/w vanco and check the trough before the 4th dose  * monitor CBC/diff and renal function  * echo    The above assessment and plan was discussed with the primary team    Genesis Ramirez MD  contact on teams  After 5pm and on weekends call [phone]
Audio visit:  Mr. Cristina is a 57 year old male with schizoaffective disorder, HTN, T2DM (A1C 6.3), obesity, chronic bronchiectasis, and CVID on Gammagard S/D usually 70 grams per month (last infusion 4/13) presenting with R gluteal wound and erythema with purulent drainage, admitted for MRSA bacteremia in the setting of right gluteal cellulitis. IgG during this admission was found to be 489 so increased dose to 70g/month.  Pt will follow with Dr. Boxer as an outpatient.

## 2023-05-08 NOTE — PROGRESS NOTE ADULT - ATTENDING COMMENTS
57M with PMH of schizoaffective d/o, CVID, HTN, DM2, hx of MRSA bacteremia likely in setting of LLE cellulitis who presents to the hospital w/ R gluteal drainage concerning for cellulitis abscess. Currently on empiric abx. Surgery on board. ID/derm/allergy,immunology on board as well. For further management.    Pt seen at bedside. Not much pain coming from right buttock. However, continues to have large amt of drainage. On exam, R buttock with large amount of purulent drainage, erythema, fluctuance covering most of gluteal region.   MRSA in cx noted.    -ECHO r/o endocarditis  -Cont vanco - trough per protocol.  -I and D -- will f/u surgery  -Derm/BH/Allergy immunology/ ID recs appreciated.

## 2023-05-08 NOTE — BH CONSULTATION LIAISON ASSESSMENT NOTE - EMPLOYMENT
Called pt and left vm for pt to call back.     Message sent to Valarie regarding Shivani's question about capsule.    Disabled

## 2023-05-08 NOTE — CONSULT NOTE ADULT - ASSESSMENT
WORK UP      ANTIBIOTIC      DIAGNOSIS and IMPRESSION        RECOMMENDATIONS        PT TO BE SEEN. PRELIM NOTE  PENDING RECS. PLEASE WAIT FOR FINAL RECS AFTER DISCUSSION WITH ATTENDING#    Santos Barnett DO, PGY-4   ID fellow  [employer] Teams Preferred  After 5pm/weekends call [phone]   57M with bipolar, chronic bronchiectasis, schizophrenia, hypogammaglobulinemia (monthly IVIG), common variable immunodeficiency, HTN presents (5/6) with R gluteal wound and erythema with pus, admitted for R Gluteal Cellulitis, course complicated by MRSA Bacteremia, ID consulted for assistance.    Patient recently AMA from [facility] 3 days ago for MRSA infection, and has since had symptoms worsening.   He denies fevers, chills, nausea, vomiting, diarrhea, sob, headaches, visual changes, LE swelling, dysuria, polyuria, melena or hematochezia.     Patient was also admitted in 3/29/2023 for delusions and LLE cellulitis, noted to have MRSA Bacteremia (3/12) from [facility] (OSH), followed by ID, Source MRSA bacteremia possible from buttock abscess/wound and or LLE cellulitis since Wound culture with MRSA. Blood culture (3/15) clear. TTE was obtain which was reassuring. Patient completed Vancomycin till 4/11/2023.      Reports that R buttocks pain is somewhat better  Denies any fever or chills  Denies any cardiac device, hardware, or prosthetic joint replacement    WORK UP  WBC 10  Cr 0.69  UA negative  CT AP with subQ opacification of R buttock c/f cellulitis no gas or abscess  BCx (5/6) MRSA 1 out of 4  HIV negative  LLE Cx (3/12) with MRSA    ANTIBIOTIC  cefepime   IVPB 2000 every 12 hours (5/6 ---> )  vancomycin  IVPB 1250 every 8 hours (5/6 ---> )    DIAGNOSIS and IMPRESSION  #MRSA Bacteremia (1 out of 4)  #R Buttocks Wound with Superinfection     - source likely from Buttocks wound    RECOMMENDATIONS  - continue Vancomycin  - discontinue Cefepime  - monitor fever curve  - trend WBC  - TTE reassuring prior admission, would repeat TTE again, may eventually need LINDA  - Wound Care  - Surgical eval ?Debridement  - follow up Blood culture, repeat BCx x2 q48 until clear      PT TO BE SEEN. PRELIM NOTE  PENDING RECS. PLEASE WAIT FOR FINAL RECS AFTER DISCUSSION WITH ATTENDINGKeira Barnett DO, PGY-4   ID fellow  [employer] Teams Preferred  After 5pm/weekends call [phone]   57M with bipolar, chronic bronchiectasis, schizophrenia, hypogammaglobulinemia (monthly IVIG), common variable immunodeficiency, HTN presents (5/6) with R gluteal wound and erythema with pus, admitted for R Gluteal Cellulitis, course complicated by MRSA Bacteremia, ID consulted for assistance.    Patient recently AMA from [facility] 3 days ago for MRSA infection, and has since had symptoms worsening.   He denies fevers, chills, nausea, vomiting, diarrhea, sob, headaches, visual changes, LE swelling, dysuria, polyuria, melena or hematochezia.     Patient was also admitted in 3/29/2023 for delusions and LLE cellulitis, noted to have MRSA Bacteremia (3/12) from [facility] (OSH), followed by ID, Source MRSA bacteremia possible from buttock abscess/wound and or LLE cellulitis since Wound culture with MRSA. Blood culture (3/15) clear. TTE was obtain which was reassuring. Patient completed Vancomycin till 4/11/2023.      Reports that R buttocks pain is somewhat better  Denies any fever or chills  Denies any cardiac device, hardware, or prosthetic joint replacement    WORK UP  WBC 10  Cr 0.69  UA negative  CT AP with subQ opacification of R buttock c/f cellulitis no gas or abscess  BCx (5/6) MRSA 1 out of 4  HIV negative  LLE Cx (3/12) with MRSA    ANTIBIOTIC  cefepime   IVPB 2000 every 12 hours (5/6 ---> )  vancomycin  IVPB 1250 every 8 hours (5/6 ---> )    DIAGNOSIS and IMPRESSION  #MRSA Bacteremia (1 out of 4)  #R Buttocks Wound with Superinfection     - source likely from Buttocks wound    RECOMMENDATIONS  - continue Vancomycin  - discontinue Cefepime  - monitor fever curve  - trend WBC  - TTE reassuring prior admission, would repeat TTE again, may eventually need LINDA if BCx remains positive  - Wound Care  - Surgical eval ?Debridement  - follow up Blood culture, repeat BCx x2 q48 until clear    Case d/w attending and primary team      Santos Barnett DO, PGY-4   ID fellow  [employer] Teams Preferred  After 5pm/weekends call [phone]

## 2023-05-09 ENCOUNTER — TRANSCRIPTION ENCOUNTER (OUTPATIENT)
Age: 58
End: 2023-05-09

## 2023-05-09 ENCOUNTER — NON-APPOINTMENT (OUTPATIENT)
Age: 58
End: 2023-05-09

## 2023-05-09 LAB
-  AMPICILLIN/SULBACTAM: SIGNIFICANT CHANGE UP
-  CEFAZOLIN: SIGNIFICANT CHANGE UP
-  CLINDAMYCIN: SIGNIFICANT CHANGE UP
-  DAPTOMYCIN: SIGNIFICANT CHANGE UP
-  ERYTHROMYCIN: SIGNIFICANT CHANGE UP
-  GENTAMICIN: SIGNIFICANT CHANGE UP
-  LINEZOLID: SIGNIFICANT CHANGE UP
-  OXACILLIN: SIGNIFICANT CHANGE UP
-  PENICILLIN: SIGNIFICANT CHANGE UP
-  RIFAMPIN: SIGNIFICANT CHANGE UP
-  TETRACYCLINE: SIGNIFICANT CHANGE UP
-  TRIMETHOPRIM/SULFAMETHOXAZOLE: SIGNIFICANT CHANGE UP
-  VANCOMYCIN: SIGNIFICANT CHANGE UP
ALBUMIN SERPL ELPH-MCNC: 3.5 G/DL — SIGNIFICANT CHANGE UP (ref 3.3–5)
ALP SERPL-CCNC: 200 U/L — HIGH (ref 40–120)
ALT FLD-CCNC: 57 U/L — HIGH (ref 4–41)
ANION GAP SERPL CALC-SCNC: 12 MMOL/L — SIGNIFICANT CHANGE UP (ref 7–14)
AST SERPL-CCNC: 38 U/L — SIGNIFICANT CHANGE UP (ref 4–40)
BASOPHILS # BLD AUTO: 0.06 K/UL — SIGNIFICANT CHANGE UP (ref 0–0.2)
BASOPHILS NFR BLD AUTO: 0.7 % — SIGNIFICANT CHANGE UP (ref 0–2)
BILIRUB SERPL-MCNC: 0.3 MG/DL — SIGNIFICANT CHANGE UP (ref 0.2–1.2)
BUN SERPL-MCNC: 11 MG/DL — SIGNIFICANT CHANGE UP (ref 7–23)
CALCIUM SERPL-MCNC: 9.6 MG/DL — SIGNIFICANT CHANGE UP (ref 8.4–10.5)
CHLORIDE SERPL-SCNC: 91 MMOL/L — LOW (ref 98–107)
CO2 SERPL-SCNC: 26 MMOL/L — SIGNIFICANT CHANGE UP (ref 22–31)
CREAT SERPL-MCNC: 0.7 MG/DL — SIGNIFICANT CHANGE UP (ref 0.5–1.3)
CULTURE RESULTS: SIGNIFICANT CHANGE UP
EGFR: 109 ML/MIN/1.73M2 — SIGNIFICANT CHANGE UP
EOSINOPHIL # BLD AUTO: 0.29 K/UL — SIGNIFICANT CHANGE UP (ref 0–0.5)
EOSINOPHIL NFR BLD AUTO: 3.3 % — SIGNIFICANT CHANGE UP (ref 0–6)
GLUCOSE BLDC GLUCOMTR-MCNC: 110 MG/DL — HIGH (ref 70–99)
GLUCOSE BLDC GLUCOMTR-MCNC: 154 MG/DL — HIGH (ref 70–99)
GLUCOSE BLDC GLUCOMTR-MCNC: 189 MG/DL — HIGH (ref 70–99)
GLUCOSE SERPL-MCNC: 112 MG/DL — HIGH (ref 70–99)
HCT VFR BLD CALC: 37.9 % — LOW (ref 39–50)
HGB BLD-MCNC: 12.4 G/DL — LOW (ref 13–17)
IANC: 6.25 K/UL — SIGNIFICANT CHANGE UP (ref 1.8–7.4)
IMM GRANULOCYTES NFR BLD AUTO: 3.2 % — HIGH (ref 0–0.9)
LYMPHOCYTES # BLD AUTO: 0.94 K/UL — LOW (ref 1–3.3)
LYMPHOCYTES # BLD AUTO: 10.8 % — LOW (ref 13–44)
MAGNESIUM SERPL-MCNC: 2.4 MG/DL — SIGNIFICANT CHANGE UP (ref 1.6–2.6)
MCHC RBC-ENTMCNC: 25.7 PG — LOW (ref 27–34)
MCHC RBC-ENTMCNC: 32.7 GM/DL — SIGNIFICANT CHANGE UP (ref 32–36)
MCV RBC AUTO: 78.5 FL — LOW (ref 80–100)
METHOD TYPE: SIGNIFICANT CHANGE UP
MONOCYTES # BLD AUTO: 0.88 K/UL — SIGNIFICANT CHANGE UP (ref 0–0.9)
MONOCYTES NFR BLD AUTO: 10.1 % — SIGNIFICANT CHANGE UP (ref 2–14)
NEUTROPHILS # BLD AUTO: 6.25 K/UL — SIGNIFICANT CHANGE UP (ref 1.8–7.4)
NEUTROPHILS NFR BLD AUTO: 71.9 % — SIGNIFICANT CHANGE UP (ref 43–77)
NRBC # BLD: 0 /100 WBCS — SIGNIFICANT CHANGE UP (ref 0–0)
NRBC # FLD: 0 K/UL — SIGNIFICANT CHANGE UP (ref 0–0)
ORGANISM # SPEC MICROSCOPIC CNT: SIGNIFICANT CHANGE UP
PHOSPHATE SERPL-MCNC: 4.6 MG/DL — HIGH (ref 2.5–4.5)
PLATELET # BLD AUTO: 330 K/UL — SIGNIFICANT CHANGE UP (ref 150–400)
POTASSIUM SERPL-MCNC: 4.8 MMOL/L — SIGNIFICANT CHANGE UP (ref 3.5–5.3)
POTASSIUM SERPL-SCNC: 4.8 MMOL/L — SIGNIFICANT CHANGE UP (ref 3.5–5.3)
PROT SERPL-MCNC: 7.8 G/DL — SIGNIFICANT CHANGE UP (ref 6–8.3)
RBC # BLD: 4.83 M/UL — SIGNIFICANT CHANGE UP (ref 4.2–5.8)
RBC # FLD: 15.9 % — HIGH (ref 10.3–14.5)
SODIUM SERPL-SCNC: 129 MMOL/L — LOW (ref 135–145)
[facility] SOURCE: SIGNIFICANT CHANGE UP
VANCOMYCIN FLD-MCNC: 7.3 UG/ML — SIGNIFICANT CHANGE UP
WBC # BLD: 8.7 K/UL — SIGNIFICANT CHANGE UP (ref 3.8–10.5)
WBC # FLD AUTO: 8.7 K/UL — SIGNIFICANT CHANGE UP (ref 3.8–10.5)

## 2023-05-09 PROCEDURE — 99232 SBSQ HOSP IP/OBS MODERATE 35: CPT

## 2023-05-09 PROCEDURE — 99233 SBSQ HOSP IP/OBS HIGH 50: CPT | Mod: GC

## 2023-05-09 PROCEDURE — 93010 ELECTROCARDIOGRAM REPORT: CPT

## 2023-05-09 PROCEDURE — 99223 1ST HOSP IP/OBS HIGH 75: CPT

## 2023-05-09 RX ORDER — ARIPIPRAZOLE 15 MG/1
5 TABLET ORAL DAILY
Refills: 0 | Status: ACTIVE | OUTPATIENT
Start: 2023-05-09 | End: 2024-04-06

## 2023-05-09 RX ORDER — LIDOCAINE HCL 20 MG/ML
30 VIAL (ML) INJECTION ONCE
Refills: 0 | Status: COMPLETED | OUTPATIENT
Start: 2023-05-10 | End: 2023-05-10

## 2023-05-09 RX ORDER — VANCOMYCIN HCL 1 G
1250 VIAL (EA) INTRAVENOUS EVERY 12 HOURS
Refills: 0 | Status: DISCONTINUED | OUTPATIENT
Start: 2023-05-09 | End: 2023-05-10

## 2023-05-09 RX ORDER — VANCOMYCIN HCL 1 G
1500 VIAL (EA) INTRAVENOUS EVERY 8 HOURS
Refills: 0 | Status: DISCONTINUED | OUTPATIENT
Start: 2023-05-09 | End: 2023-05-09

## 2023-05-09 RX ORDER — SODIUM HYPOCHLORITE 0.125 %
1 SOLUTION, NON-ORAL MISCELLANEOUS
Refills: 0 | Status: DISCONTINUED | OUTPATIENT
Start: 2023-05-09 | End: 2023-06-06

## 2023-05-09 RX ADMIN — ATORVASTATIN CALCIUM 40 MILLIGRAM(S): 80 TABLET, FILM COATED ORAL at 22:08

## 2023-05-09 RX ADMIN — ENOXAPARIN SODIUM 40 MILLIGRAM(S): 100 INJECTION SUBCUTANEOUS at 06:55

## 2023-05-09 RX ADMIN — ARIPIPRAZOLE 5 MILLIGRAM(S): 15 TABLET ORAL at 14:13

## 2023-05-09 RX ADMIN — Medication 166.67 MILLIGRAM(S): at 22:09

## 2023-05-09 RX ADMIN — Medication 300 MILLIGRAM(S): at 06:55

## 2023-05-09 RX ADMIN — BUDESONIDE AND FORMOTEROL FUMARATE DIHYDRATE 2 PUFF(S): 160; 4.5 AEROSOL RESPIRATORY (INHALATION) at 22:08

## 2023-05-09 RX ADMIN — Medication 166.67 MILLIGRAM(S): at 10:34

## 2023-05-09 RX ADMIN — LOSARTAN POTASSIUM 100 MILLIGRAM(S): 100 TABLET, FILM COATED ORAL at 06:55

## 2023-05-09 RX ADMIN — Medication 1 APPLICATION(S): at 18:05

## 2023-05-09 RX ADMIN — Medication 2000 UNIT(S): at 12:51

## 2023-05-09 RX ADMIN — BUDESONIDE AND FORMOTEROL FUMARATE DIHYDRATE 2 PUFF(S): 160; 4.5 AEROSOL RESPIRATORY (INHALATION) at 09:30

## 2023-05-09 NOTE — PROGRESS NOTE ADULT - PROBLEM SELECTOR PLAN 5
Pt with h/o variable hyponatremia. SOsm calc c/w hypotonic hyponatremia. Farrukh low suggestive of sodium-avid state (i.e. RAAS activation), UOsm quite low, though c/f psychogenic polydipsia vs poor solute intake.. Unclear contribution of ARB as OP as pt not filling medications consistently.   likely 2/2 SIADH as improved with fluid restriction   - Trend Na   -c/w fluid restriction

## 2023-05-09 NOTE — PROGRESS NOTE ADULT - PROBLEM SELECTOR PLAN 10
Diet: CC  DVT: hold lovenox for possible debridement by wound care   Dispo: pending clinical improvement

## 2023-05-09 NOTE — PROGRESS NOTE ADULT - SUBJECTIVE AND OBJECTIVE BOX
Follow Up:  MRSA bacteremia and abscess    Interval History/ROS: no fever, cough, abd pain, still with purulent drainage and swelling of buttocks        Allergies  amoxicillin (Fever)  penicillin (Rash)        ANTIMICROBIALS:  vancomycin  IVPB 1250 every 12 hours      OTHER MEDS:  acetaminophen     Tablet .. 975 milliGRAM(s) Oral every 6 hours PRN  albuterol    90 MICROgram(s) HFA Inhaler 2 Puff(s) Inhalation every 6 hours PRN  ARIPiprazole 5 milliGRAM(s) Oral daily  atorvastatin 40 milliGRAM(s) Oral at bedtime  budesonide 160 MICROgram(s)/formoterol 4.5 MICROgram(s) Inhaler 2 Puff(s) Inhalation two times a day  cholecalciferol 2000 Unit(s) Oral daily  Dakins Solution - 1/4 Strength 1 Application(s) Topical two times a day  dextrose 5%. 1000 milliLiter(s) IV Continuous <Continuous>  dextrose 50% Injectable 25 Gram(s) IV Push once  dextrose 50% Injectable 25 Gram(s) IV Push once  dextrose 50% Injectable 12.5 Gram(s) IV Push once  dextrose Oral Gel 15 Gram(s) Oral once PRN  diltiazem    milliGRAM(s) Oral daily  glucagon  Injectable 1 milliGRAM(s) IntraMuscular once  losartan 100 milliGRAM(s) Oral daily  OLANZapine 5 milliGRAM(s) Oral every 6 hours PRN  oxyCODONE    IR 5 milliGRAM(s) Oral every 4 hours PRN      Vital Signs Last 24 Hrs  T(C): 37 (09 May 2023 15:10), Max: 37 (08 May 2023 17:33)  T(F): 98.6 (09 May 2023 15:10), Max: 98.6 (08 May 2023 17:33)  HR: 88 (09 May 2023 15:10) (77 - 93)  BP: 153/84 (09 May 2023 15:10) (146/75 - 177/94)  BP(mean): --  RR: 19 (09 May 2023 15:10) (17 - 19)  SpO2: 92% (09 May 2023 15:10) (92% - 98%)    Parameters below as of 09 May 2023 15:10  Patient On (Oxygen Delivery Method): room air        Physical Exam:  General:    NAD,  non toxic  Respiratory:    comfortable on RA  abd:     soft,   BS +,   no tenderness  :   no CVAT,  no suprapubic tenderness,   no  arceo  Musculoskeletal:   no joint swelling  vascular: no phlebitis  Skin:   +R buttock wound with periwound erythema, fluotracen purulent drainage, TTP                        12.4   8.70  )-----------( 330      ( 09 May 2023 05:27 )             37.9       05-09    129<L>  |  91<L>  |  11  ----------------------------<  112<H>  4.8   |  26  |  0.70    Ca    9.6      09 May 2023 05:27  Phos  4.6     05-09  Mg     2.40     05-09    TPro  7.8  /  Alb  3.5  /  TBili  0.3  /  DBili  x   /  AST  38  /  ALT  57<H>  /  AlkPhos  200<H>  05-09          MICROBIOLOGY:  Vancomycin Level, Random: 7.3 ug/mL (05-09-23 @ 05:27)  v  .Blood Blood-Peripheral  05-08-23   No growth to date.  --  --      .Blood Blood-Peripheral  05-08-23   No growth to date.  --  --      Clean Catch Clean Catch (Midstream)  05-06-23   No growth  --  --      .Blood Blood-Peripheral  05-06-23   Growth in aerobic bottle: Methicillin Resistant Staphylococcus aureus  ***Blood Panel PCR results on this specimen are available  approximately 3 hours after the Gram stain result.***  Gram stain, PCR, and/or culture results may not always  correspond due to difference in methodologies.  ************************************************************  This PCR assay was performed by multiplex PCR. This  Assay tests for 66 bacterial and resistance gene targets.  Please refer to the [facility] [employer] test directory  at [email or web address] for details.  --  Blood Culture PCR  Methicillin resistant Staphylococcus aureus      .Blood Blood-Peripheral  05-06-23   No growth to date.  --  --                RADIOLOGY:  Images independently visualized and reviewed personally, findings as below  < from: CT Pelvis w/ IV Cont (05.06.23 @ 21:22) >  IMPRESSION:  Moderate subcutaneous inflammatory change and skin thickening in the   right buttocks suggestive of a cellulitis. No associated rim-enhancing   fluid collection to suggest an abscess. No subcutaneous gas.    < end of copied text >  < from: Transthoracic Echocardiogram (05.08.23 @ 16:12) >  CONCLUSIONS:  1. Normal mitral valve. Minimal mitral regurgitation.  2. Aortic valve leaflet morphology not well visualized.  Mild aortic regurgitation.  3. Normal left ventricular internal dimensions and wall  thicknesses.  4. Normal left ventricular systolic function. No segmental  wall motion abnormalities.  5. Normal left ventricular diastolic function.  6. The right ventricle is not well visualized; grossly  normal right ventricular systolic function.  Unable to exclude endocarditis.  Consider LINDA for further  evaluation, if clinically indicated.    < end of copied text >

## 2023-05-09 NOTE — BH CONSULTATION LIAISON PROGRESS NOTE - NSBHASSESSMENTFT_PSY_ALL_CORE
56M, domiciled in supportive housing TSI, SSD, single, PMH HTN, DM, hypogammaglobulinemia, PPHx schizoaffective d/o, bipolar d/o, hx of multiple psych hospitalizations (last known in 2020 at [facility]), denies hx SA/NSSIB, denies drug or alcohol use, denies legal hx, who presented with right gluteal wound, admitted to medicine for cellulutis. Psychiatry consulted for psychosis/schizophrenia.    On assessment, patient is calm and cooperative. AAO3. No overt symptoms of psychosis, depression, or yo. No S/I or H/I. Patient w/ recurrent/chronic infections. Patient has left multiple hospitals AMA while undergoing treatment for cellulitis. However, patient recognizes benefit of continuing w/ antibiotic treatment, as well as risks of discontinuing treatment. Patient not currently refusing treatment or attempting to leave AMA. Patient w/o severe psychiatric symptoms interfering w/ ability to care for self or make informed decisions. Patient interested in reconnection to outpatient care after discharge. Patient was due for Haldol decanoate 150mg on 4/16. Will obtain collateral see when last received.     5/9: no interval change. no overt positive symptoms of psychosis. patient w/ likely negative/cognitive symptoms that impair ability to adequately care for self (e.g. likely contribute to poor hygiene, recurrent infections, etc.). no si or hi. interested in resuming outpatient care and restarting psychotropics. recommend aripiprazole 5mg qd for psychosis/mood (w/ reported history of depression).     Plan:  - Routine observation, no psychiatric indication for CO 1:1  [ ] START aripiprazole 5mg qd for psychosis/mood  - Will determine date of last Haldol dec; can be given during hospitalization; needs EKG prior to administration  - If patient refuses treatment or attempts to leave AMA, psych can reassess for capacity   - Zyprexa 2.5-5mg PO/IM/IV q6hrs PRN for agitation  - Will obtain additional collateral from previous o/p psychiatrist Dr. Cook  - Dispo: when medically cleared; likely does not meet criteria for inpatient psychiatric admission

## 2023-05-09 NOTE — CONSULT NOTE ADULT - SUBJECTIVE AND OBJECTIVE BOX
Patient is a Received Date/Time: 3/21/2023 17:18 EDT SurgicalPathology Report - Auth (Verified)   Specimen(s) Submitted 1-Left lower leg Final Diagnosis Left lower leg, punch biopsy: - Epidermal hyperplasia, dermal fibrosis, and chronic inflammation Comment: Clinical photos and note reviewed. Multiple deeper levels examined. There is no evidence for pyoderma gangrenosum or a neoplasm. Factor XIIIa highlights scattered fibroblasts. GMS and Gram stains are negative for microorganisms. These findings can be seen adjacento an ulcer or wound. Clinical pathologic correlation is recommended. The immunohistochemical stain is performed at [facility].   Verified by: Dolores Oneal DO   (Electronic Signature)   Reported on: 03/31/23 14:13 EDT, Dermatopathology, [address], [address], [place] [address]   ED course: CT pelvis w/ iv contrast noted buttocks cellulitis, chronic bronchiectasis noted, leukocytosis to 13k, and hyponatremia 122.  (07 May 2023 02:36)      Wound consult requested by team to assist w/ management of      wound   with worsening swelling. Offloading and pericare initiated upon admission as pt Increasingly sedentary 2/2 to illness.      Appetite good Current Diet: Diet, Consistent Carbohydrate w/Evening Snack:   1500mL Fluid Restriction ([id number]) (05-08-23 @ 13:59)  REVIEW OF SYSTEMSsee HPI and PMH others neg,    Allergies    amoxicillin (Fever)  penicillin (Rash)    Intolerances    MEDICATIONS  (STANDING):  atorvastatin 40 milliGRAM(s) Oral at bedtime  budesonide 160 MICROgram(s)/formoterol 4.5 MICROgram(s) Inhaler 2 Puff(s) Inhalation two times a day  cholecalciferol 2000 Unit(s) Oral daily  dextrose 5%. 1000 milliLiter(s) (50 mL/Hr) IV Continuous <Continuous>  dextrose 5%. 1000 milliLiter(s) (100 mL/Hr) IV Continuous <Continuous>  dextrose 50% Injectable 25 Gram(s) IV Push once  dextrose 50% Injectable 25 Gram(s) IV Push once  dextrose 50% Injectable 12.5 Gram(s) IV Push once  diltiazem    milliGRAM(s) Oral daily  enoxaparin Injectable 40 milliGRAM(s) SubCutaneous every 24 hours  glucagon  Injectable 1 milliGRAM(s) IntraMuscular once  insulin lispro (ADMELOG) corrective regimen sliding scale   SubCutaneous at bedtime  insulin lispro (ADMELOG) corrective regimen sliding scale   SubCutaneous three times a day before meals  losartan 100 milliGRAM(s) Oral daily  vancomycin  IVPB 1500 milliGRAM(s) IV Intermittent every 8 hours    MEDICATIONS  (PRN):  acetaminophen     Tablet .. 975 milliGRAM(s) Oral every 6 hours PRN Temp greater or equal to 38C (100.4F), Mild Pain (1 - 3), Moderate Pain (4 - 6), Severe Pain (7 - 10)  albuterol    90 MICROgram(s) HFA Inhaler 2 Puff(s) Inhalation every 6 hours PRN Shortness of Breath and/or Wheezing  dextrose Oral Gel 15 Gram(s) Oral once PRN Blood Glucose LESS THAN 70 milliGRAM(s)/deciliter  OLANZapine 5 milliGRAM(s) Oral every 6 hours PRN Agitation  oxyCODONE    IR 5 milliGRAM(s) Oral every 4 hours PRN Moderate Pain (4 - 6)    SOCIAL HISTORY:  / /single/ ; (+)HHA/ lives in SNF; Former smoker, No current/ Denies smoking, ETOH, drugs  DNR status; isolation status    FAMILY HISTORY:  Family history of throat cancer (Father)    Family history of leukemia (Mother)     no h/o PVD or wound healing or skin/ significant problems  PAST MEDICAL & SURGICAL HISTORY:  Smoker  DM (diabetes mellitus)  HTN (hypertension)  Abscess of finger  Bipolar disorder  Chronic hyponatremia  CVID (common variable immunodeficiency)  Hyponatremia  Deviated septum  Loss of teeth due to extraction  All teeth due to dental carries    I&O's Summary    08 May 2023 07:01  -  09 May 2023 07:00  --------------------------------------------------------  IN: 3750 mL / OUT: 4350 mL / NET: -600 mL      Vital Signs Last 24 Hrs  T(C): 36.5 (09 May 2023 05:30), Max: 37 (08 May 2023 17:33)  T(F): 97.7 (09 May 2023 05:30), Max: 98.6 (08 May 2023 17:33)  HR: 93 (09 May 2023 05:30) (77 - 93)  BP: 177/94 (09 May 2023 05:30) (142/83 - 177/94)  BP(mean): --  RR: 19 (09 May 2023 05:30) (17 - 19)  SpO2: 94% (09 May 2023 05:30) (91% - 98%)    Parameters below as of 09 May 2023 05:30  Patient On (Oxygen Delivery Method): room air            PHYSICAL EXAM:  Constitutional: NAD  A&Ox3/ Alert/     MO/ Obese,    HEENT:  NC/AT, PERRL, EOMI, sclera clear, mucosa moist, throat clear, trachea midline, neck supple,   Respiratory: nonlabored w/ equal chest rise  Breast :nl  Gastrointestinal: soft NT/ND (+)BS  constipated  : continent nl scrotum groin  Neurology:  weakened strength & sensation grossly intact     follow commands,   Psych: calm/ appropriate   Musculoskeletal:  ambulatory with assist or wc  Vascular: BLE equally warm/nlcap refill                BLE DP/PT pulses palpable          BLE hemosiderin staining/ varicose veins  previous Sacrum to left buttock- healed  Silicone foam applied.  Respiratory: NAD, room air   Cardiovascular: rate regular  Gastrointestinal: non tender, obese   Vascular: Long toenails. No edema. No temperature changes. No signs of overt ischemia. DP +2 pulses. thick callous on heels and bilateral great toe.   Left leg chronic wound atypical? Unclear etiology-2x1.5 ( [id number]  1.8cmx1.5cmx0.4cm (prev 2cmx1.5cmx0.4cm). Tissue type exposing 100% red granular/hypergranular tissue. Scant serosanguinous drainage. . No associated cellulitis. Periwound skin raised hyperpigmented edges extending 1cm circumferentially, no fluctuance. No tenderness.  Silicone foam with border applied.  Skin: as noted  Risk for moisture associated dermatitis to lower buttocks and perineum, no open ulcers.    Skin:  moist w/ good turgor, scars, bsa %  thin, dry, pale, frail,  ecchymosis w/o hematoma, color temp  blistering  or serosanguinous drainage  No odor, erythema, increased warmth, tenderness, induration, fluctuance, nor crepitus   left buttock scab .5x.5x0 eschar  wound  buttouck right 6.5 x12.5x3.5 with abscess, 10cc evacuated with compression wet to dry placed        drainage  M/L  type sero purulent sanginous  exposed bone, no  necrosis none,<33% ,   granular none,<33% ,   CBC Full  -  ( 09 May 2023 05:27 )  WBC Count : 8.70 K/uL  RBC Count : 4.83 M/uL  Hemoglobin : 12.4 g/dL  Hematocrit : 37.9 %  Platelet Count - Automated : 330 K/uL  Mean Cell Volume : 78.5 fL  Mean Cell Hemoglobin : 25.7 pg  Mean Cell Hemoglobin Concentration : 32.7 gm/dL  Auto Neutrophil # : 6.25 K/uL  Auto Lymphocyte # : 0.94 K/uL  Auto Monocyte # : 0.88 K/uL  Auto Eosinophil # : 0.29 K/uL  Auto Basophil # : 0.06 K/uL  Auto Neutrophil % : 71.9 %  Auto Lymphocyte % : 10.8 %  Auto Monocyte % : 10.1 %  Auto Eosinophil % : 3.3 %  Auto Basophil % : 0.7 %    05-09    129<L>  |  91<L>  |  11  ----------------------------<  112<H>  4.8   |  26  |  0.70    Ca    9.6      09 May 2023 05:27  Phos  4.6     05-09  Mg     2.40     05-09    TPro  7.8  /  Alb  3.5  /  TBili  0.3  /  DBili  x   /  AST  38  /  ALT  57<H>  /  AlkPhos  200<H>  05-09              radiology< from: CT Pelvis w/ IV Cont (05.06.23 @ 21:22) >  ABDOMINAL WALL: Moderate subcutaneous inflammatory change and skin   thickening in the right buttocks suggestive of a cellulitis. No   associated rim-enhancing fluid collection to suggest an abscess. No   subcutaneous gas.  BONES: No fracture or dislocation. Degenerative changes of the visualized   lower lumbar spine.    IMPRESSION:  Moderate subcutaneous inflammatory change and skin thickening in the   right buttocks suggestive of a cellulitis. No associated rim-enhancing   fluid collection to suggest an abscess. No subcutaneous gas.    --- End of Report ---          VICENTE GARCIA MD; Resident Radiologist  This document has been electronically signed.  ANGELINE DANIEL MD; Attending Radiologist  This document has been electronically signed. May  6 2023 10:36PM    < end of copied text >     urgical Pathology Report (03.21.23 @ 17:18)   Surgical Pathology Report:   ACCESSION No: [id number]   Patient: DAT WOOD   Accession: [id number]   Collected Date/Time: 3/21/2023 17:18 EDT   Received Date/Time: 3/21/2023 17:18 EDT   SurgicalPathology Report - Auth (Verified)   Specimen(s) Submitted   1-Left lower leg   Final Diagnosis   Left lower leg, punch biopsy:   - Epidermal hyperplasia, dermal fibrosis, and chronic inflammation   Comment: Clinical photos and note reviewed. Multiple deeper levels   examined. There is no evidence for pyoderma gangrenosum or a neoplasm.   Factor XIIIa highlights scattered fibroblasts. GMS and Gram stains are   negative for microorganisms. These findings can be seen adjacent to an   ulcer or wound. Clinical pathologic correlation is recommended.   The immunohistochemical stain is performed at [facility].   Verified by: Dolores Oneal DO   (Electronic Signature)   Reported on: 03/31/23 14:13 EDT, Dermatopathology, [address], [address], [place] [address]   Phone: [phone] Fax: [phone]  Patient is a 57y old  Male who presents with a chief complaint of  wounds,  hx of bipolar, chronic bronchiectasis, schizophrenia, hypogammaglobulinemia (monthly IVIG), common variable immunodeficiency, HTN, who presents to the ER w/ right gluteal wound and erythema with pus as well. He was hospitalized recently for MRSA infection and s/p vancomycin extended duration. Patient left AMA from [facility] 2 days ago and was on clindamycin with worsening symptoms.   purulent abscess of right buttock    ED course: CT pelvis w/ iv contrast noted buttocks cellulitis, chronic bronchiectasis noted, leukocytosis to 13k, and hyponatremia 122.  (07 May 2023 02:36)    Wound consult requested by team to assist w/ management of      wound   with worsening swelling. Offloading and pericare initiated upon admission as pt Increasingly sedentary 2/2 to illness.      Appetite good Current Diet: Diet, Consistent Carbohydrate w/Evening Snack:   1500mL Fluid Restriction ([id number]) (05-08-23 @ 13:59)  REVIEW OF SYSTEMSsee HPI and PMH others neg,    Allergies    amoxicillin (Fever)  penicillin (Rash)    Intolerances    MEDICATIONS  (STANDING):  atorvastatin 40 milliGRAM(s) Oral at bedtime  budesonide 160 MICROgram(s)/formoterol 4.5 MICROgram(s) Inhaler 2 Puff(s) Inhalation two times a day  cholecalciferol 2000 Unit(s) Oral daily  dextrose 5%. 1000 milliLiter(s) (50 mL/Hr) IV Continuous <Continuous>  dextrose 5%. 1000 milliLiter(s) (100 mL/Hr) IV Continuous <Continuous>  dextrose 50% Injectable 25 Gram(s) IV Push once  dextrose 50% Injectable 25 Gram(s) IV Push once  dextrose 50% Injectable 12.5 Gram(s) IV Push once  diltiazem    milliGRAM(s) Oral daily  enoxaparin Injectable 40 milliGRAM(s) SubCutaneous every 24 hours  glucagon  Injectable 1 milliGRAM(s) IntraMuscular once  insulin lispro (ADMELOG) corrective regimen sliding scale   SubCutaneous at bedtime  insulin lispro (ADMELOG) corrective regimen sliding scale   SubCutaneous three times a day before meals  losartan 100 milliGRAM(s) Oral daily  vancomycin  IVPB 1500 milliGRAM(s) IV Intermittent every 8 hours    MEDICATIONS  (PRN):  acetaminophen     Tablet .. 975 milliGRAM(s) Oral every 6 hours PRN Temp greater or equal to 38C (100.4F), Mild Pain (1 - 3), Moderate Pain (4 - 6), Severe Pain (7 - 10)  albuterol    90 MICROgram(s) HFA Inhaler 2 Puff(s) Inhalation every 6 hours PRN Shortness of Breath and/or Wheezing  dextrose Oral Gel 15 Gram(s) Oral once PRN Blood Glucose LESS THAN 70 milliGRAM(s)/deciliter  OLANZapine 5 milliGRAM(s) Oral every 6 hours PRN Agitation  oxyCODONE    IR 5 milliGRAM(s) Oral every 4 hours PRN Moderate Pain (4 - 6)    SOCIAL HISTORY:   single/ lives  with family/ Denies smoking, ETOH, drugs    isolation status MRSA    FAMILY HISTORY:  Family history of throat cancer (Father)    Family history of leukemia (Mother)     no h/o PVD or wound healing or skin/ significant problems  PAST MEDICAL & SURGICAL HISTORY:  Smoker  DM (diabetes mellitus)  HTN (hypertension)  Abscess of finger  Bipolar disorder  Chronic hyponatremia  CVID (common variable immunodeficiency)  Hyponatremia  Deviated septum  Loss of teeth due to extraction  All teeth due to dental carries    I&O's Summary    08 May 2023 07:01  -  09 May 2023 07:00  --------------------------------------------------------  IN: 3750 mL / OUT: 4350 mL / NET: -600 mL      Vital Signs Last 24 Hrs  T(C): 36.5 (09 May 2023 05:30), Max: 37 (08 May 2023 17:33)  T(F): 97.7 (09 May 2023 05:30), Max: 98.6 (08 May 2023 17:33)  HR: 93 (09 May 2023 05:30) (77 - 93)  BP: 177/94 (09 May 2023 05:30) (142/83 - 177/94)  BP(mean): --  RR: 19 (09 May 2023 05:30) (17 - 19)  SpO2: 94% (09 May 2023 05:30) (91% - 98%)    Parameters below as of 09 May 2023 05:30  Patient On (Oxygen Delivery Method): room air       PHYSICAL EXAM:  Constitutional: NAD  A&Ox3/ Alert/     MO/ Obese,    HEENT:  NC/AT, PERRL, EOMI, sclera clear, mucosa moist, throat clear, trachea midline, neck supple,   Respiratory: nonlabored w/ equal chest rise  Breast :nl  Gastrointestinal: soft NT/ND (+)BS  constipated  : continent nl scrotum groin  Neurology:  weakened strength & sensation grossly intact     follow commands,   Psych: calm/ appropriate   Musculoskeletal:  ambulatory with assist or wc  Vascular: BLE equally warm/nlcap refill                BLE DP/PT pulses palpable          BLE hemosiderin staining/ varicose veins  previous Sacrum to left buttock- healed  Silicone foam applied.    Vascular: Long toenails. No edema. No temperature changes. No signs of overt ischemia. DP +2 pulses. thick callous on heels and bilateral great toe.   Left leg chronic wound  -2x1.5 ( [id number]  1.8cmx1.5cmx0.4cm (prev 2cmx1.5cmx0.4cm). Tissue type exposing 100% red granular/hypergranular tissue. Scant serosanguinous drainage. . No associated cellulitis. Periwound skin raised hyperpigmented edges extending 1cm circumferentially, no fluctuance. No tenderness.  Silicone foam with border applied.  Skin: as noted  Risk for moisture associated dermatitis to lower buttocks and perineum, no open ulcers.    Skin:  moist w/ good turgor,     right buttock with ulcer  erythema, increased warmth, tenderness, induration, fluctuance, nor crepitus   left buttock scab .5x.5x0 eschar  wound  buttuck right 6.5 x12.5x3.5 with abscess,   10cc evacuated with compression wet to dry placed    drainage  M/L  type sero purulent sanginous  exposed bone, no  necrosis none,<33% ,   granular none,<33% ,   CBC Full  -  ( 09 May 2023 05:27 )  WBC Count : 8.70 K/uL  RBC Count : 4.83 M/uL  Hemoglobin : 12.4 g/dL  Hematocrit : 37.9 %  Platelet Count - Automated : 330 K/uL  Mean Cell Volume : 78.5 fL  Mean Cell Hemoglobin : 25.7 pg  Mean Cell Hemoglobin Concentration : 32.7 gm/dL  Auto Neutrophil # : 6.25 K/uL  Auto Lymphocyte # : 0.94 K/uL  Auto Monocyte # : 0.88 K/uL  Auto Eosinophil # : 0.29 K/uL  Auto Basophil # : 0.06 K/uL  Auto Neutrophil % : 71.9 %  Auto Lymphocyte % : 10.8 %  Auto Monocyte % : 10.1 %  Auto Eosinophil % : 3.3 %  Auto Basophil % : 0.7 %    05-09    129<L>  |  91<L>  |  11  ----------------------------<  112<H>  4.8   |  26  |  0.70    Ca    9.6      09 May 2023 05:27  Phos  4.6     05-09  Mg     2.40     05-09    TPro  7.8  /  Alb  3.5  /  TBili  0.3  /  DBili  x   /  AST  38  /  ALT  57<H>  /  AlkPhos  200<H>  05-09              radiology< from: CT Pelvis w/ IV Cont (05.06.23 @ 21:22) >  ABDOMINAL WALL: Moderate subcutaneous inflammatory change and skin   thickening in the right buttocks suggestive of a cellulitis. No   associated rim-enhancing fluid collection to suggest an abscess. No   subcutaneous gas.  BONES: No fracture or dislocation. Degenerative changes of the visualized   lower lumbar spine.    IMPRESSION:  Moderate subcutaneous inflammatory change and skin thickening in the   right buttocks suggestive of a cellulitis. No associated rim-enhancing   fluid collection to suggest an abscess. No subcutaneous gas.    --- End of Report ---          VICENTE GARCIA MD; Resident Radiologist  This document has been electronically signed.  ANGELINE DANIEL MD; Attending Radiologist  This document has been electronically signed. May  6 2023 10:36PM    < end of copied text >     urgical Pathology Report (03.21.23 @ 17:18)   Surgical Pathology Report:   ACCESSION No: [id number]   Patient: DAT WOOD   Accession: [id number]   Collected Date/Time: 3/21/2023 17:18 EDT   Received Date/Time: 3/21/2023 17:18 EDT   SurgicalPathology Report - Auth (Verified)   Specimen(s) Submitted   1-Left lower leg   Final Diagnosis   Left lower leg, punch biopsy:   - Epidermal hyperplasia, dermal fibrosis, and chronic inflammation   Comment: Clinical photos and note reviewed. Multiple deeper levels   examined. There is no evidence for pyoderma gangrenosum or a neoplasm.   Factor XIIIa highlights scattered fibroblasts. GMS and Gram stains are   negative for microorganisms. These findings can be seen adjacent to an   ulcer or wound. Clinical pathologic correlation is recommended.   The immunohistochemical stain is performed at [facility].   Verified by: Dolores Oneal DO   (Electronic Signature)   Reported on: 03/31/23 14:13 EDT, Dermatopathology, [address], [address], [place] [address]   Phone: [phone] Fax: [phone]

## 2023-05-09 NOTE — PROGRESS NOTE ADULT - PROBLEM SELECTOR PLAN 6
Pt with known bronchiectasis based on CT chest in 2017. Pt denies having an OP Pulmonologist.   currently asymptomatic   -ctm  -incentive spirometry

## 2023-05-09 NOTE — PROGRESS NOTE ADULT - PROBLEM SELECTOR PLAN 2
Historically diagnosed. Pt confirms schizophrenia but denies being on any active psychotropic medications. Denies AH/VH. Denies SI/HI. Does not specify why he left AMA from other hospital, but does understand that he needs help here. historically lacked capacity to leave AMA, but not trying at this time.  - No need for 1:1 at this time  - Psychiatry/ Consult recs appreciated  -start aripiprazole 5mg qd for psychosis/mood  - If pt tries to leave AMA, Psych can assess capacity (incl. judgement/understanding of risk/benefit to incomplete treatment)  - C/W zyrprexa 5 PO for agitation

## 2023-05-09 NOTE — PROGRESS NOTE ADULT - ATTENDING COMMENTS
57M with PMH of schizoaffective d/o, CVID, HTN, DM2, hx of MRSA bacteremia likely in setting of LLE cellulitis who presents to the hospital w/ R gluteal drainage concerning for cellulitis abscess. Currently on empiric abx. Surgery on board. ID/derm/allergy, BH, immunology on board as well. For further management. Infectious workup now revealing MRSA bacteremia, likely related to his buttock abscess. Clinically stable on IV abx. Pending further wound care/improvement.    Pt seen at bedside. Pain controlled. Still w/ significant buttock drainage. Labs stable.     -ECHO noted - endocarditis not definitively ruled out. Consider LINDA, will d/w ID.  -Cont vanco - dosing based on AUC - appreciate pharm recs.  -Wound care to debride wound. Appreciate Derm, immunology recs.  -BH recs noted. Cont current plan.  -Holding lovenox for now - pt remains ambulatory at baseline.    Rest of plan as above.

## 2023-05-09 NOTE — CONSULT NOTE ADULT - ASSESSMENT
Assessment  / Interventions : 57y old  Male who presents with a chief complaint of  wounds,  hx of bipolar, chronic bronchiectasis, schizophrenia, hypogammaglobulinemia (monthly IVIG), common variable immunodeficiency, HTN, who presents to the ER w/ right gluteal wound and erythema with pus as well. He was hospitalized recently for MRSA infection and s/p vancomycin extended duration. Patient left AMA from [facility] 2 days ago and was on clindamycin with worsening symptoms.   purulent abscess of right buttock  culture sent       DVT prophylaxisis : on AC  Y/N- type     / Anemia on PPI-   transfusions, smoker   4 -Atypical-other  dermatology consult, biopsy  done   cellulitis    currently h/o MRSA     - Wound with STI:topical /  c/s taken/ mrsa  ID  consult Abx per Medicine/ ID /              Debridement : A- Mechanical wet to dry   Selective/ excisional sharp- indicated- will explore consent with family  For Sharp :  / INR/ hct  >30    Dressing: Absorptive  :   consider probiotic acidophilus/ nystatin po     Topical Dry: CAVILON  foam for leg wound     Nutrition Consult for optimization   Encourage  PO intake, & Increased nutritional needs with albumin <2.5;  35cal/kg, protien 1.2-1.5g/kg,supplement ,     MVI & Vit B/ C     consider Endo Consult,check HgA1c// for   Obesity management; if>40bmi <65 consider bariatric referral  Continue w/ low air loss pressure redistribution bed surface   Pressure :Group 2 mattress on hospital bed and ROHO cushion for wheel chair upon discharge home  Offload: podiatric/  for home consider Waffle Cushion to chair when oob to chair/ Turning and positioning w/ offloading assistive devices as per protocol    Established/ New problem--- improved, stable , worsened  Additional workup : as noted/ Consider sed/ crp/  BARON/PVR, Duplex, Xray,   CT or MRI ,  OT, PT  data reviewed lab, tests, records, image  Complexity high mod  Risk read y/n  30 d / CHF/ ESRD /sepsis   high -  due to dx, complexity data, risk  Risk : PSA,  CRE, VRE/ MRSA/ cdiff TB/RSV cov  other viral Care as per medicine, will follow w/ you/ remain available as requested   Upon discharge f/u as outpatient at Wound Center [address] [facility] [phone]/ [facility]  [address] 2nd floor [phone]  time 75 min 223   IMPROVE VTE Individual Risk Assessment[  ] Previous VTE  3/[  ] Thrombophilia 2/[  ] Lower limb paralysis  2      (unable to hold up >15 seconds)  [  ]  Current Cancer   2      (within 6 months)             [  ]                    Immobilization > 24 hrs      1 [  ] ICU/CCU stay > 24 hours  1 [  ] Age > 60        1  IMPROVE VTE Score _________Caprini Score 0 - 2: or IMPROVE Score 0-1: Low Risk, No VTE pro   Assessment  / Interventions : 57y old  Male who presents with a chief complaint of  wounds,  hx of bipolar, chronic bronchiectasis, schizophrenia, hypogammaglobulinemia (monthly IVIG), common variable immunodeficiency, HTN, who presents to the ER w/ right gluteal wound and erythema with pus as well. He was hospitalized recently for MRSA infection and s/p vancomycin extended duration. Patient left AMA from [facility] 2 days ago and was on clindamycin with worsening symptoms.   purulent abscess of right buttock  culture sent  discussed with team, and patient      DVT prophylaxisis : on AC     Atypical-other  dermatology consult, biopsy  done - benign    - right buttock Wound with STI:topical /  c/s taken   ID  consult Abx per Medicine/ ID /   cellulitis    currently h/o MRSA     Debridement : A- Mechanical wet to dry   Selective/ excisional sharp- indicated- will explore consent with family- Kenia- as per patient ( not Brittany) and he agrees to procedure for tommorrow at bedside  For Sharp :  / INR/ hct  >30   on lovenox to be held  Dressing: Absorptive  :  abd  consider probiotic acidophilus/ nystatin po   Topical Dry: CAVILON  foam for leg wound   Nutrition Consult for optimization   Encourage  PO intake, & Increased nutritional needs with albumin <2.5;  25cal/kg, protien 1.2-1.5g/kg,supplement ,     MVI & Vit B/ C     not a candidate for bariatric referral  Continue w/ low air loss pressure redistribution bed surface   Pressure :Group 2 mattress on hospital bed and ROHO cushion for wheel chair upon discharge home  Offload:  consider Waffle Cushion to chair when oob to chair/ Turning and positioning w/ offloading assistive devices as per protocol    Established/ New problem---  stable , worsened  Additional workup : as noted/   data reviewed lab, tests, records, image  Complexity high    Risk read - s/p [facility], signed out ama / due to dx, complexity data, risk  Risk :  MRSA/   Care as per medicine, will follow w/ you/ remain available as requested   Upon discharge f/u as outpatient at Wound Center [address] [facility] [phone]/ [facility]  [address]nd floor [phone]  time 75 min 223

## 2023-05-09 NOTE — PROGRESS NOTE ADULT - SUBJECTIVE AND OBJECTIVE BOX
Surgery Progress Note     Subjective/24hour Events:   Patient seen and examined. No new complaints.        Vital Signs:  Vital Signs Last 24 Hrs  T(C): 36.5 (09 May 2023 05:30), Max: 37 (08 May 2023 17:33)  T(F): 97.7 (09 May 2023 05:30), Max: 98.6 (08 May 2023 17:33)  HR: 93 (09 May 2023 05:30) (77 - 93)  BP: 177/94 (09 May 2023 05:30) (142/83 - 177/94)  BP(mean): --  RR: 19 (09 May 2023 05:30) (17 - 19)  SpO2: 94% (09 May 2023 05:30) (91% - 98%)    Parameters below as of 09 May 2023 05:30  Patient On (Oxygen Delivery Method): room air        CAPILLARY BLOOD GLUCOSE      POCT Blood Glucose.: 189 mg/dL (09 May 2023 12:46)  POCT Blood Glucose.: 110 mg/dL (09 May 2023 08:50)  POCT Blood Glucose.: 130 mg/dL (08 May 2023 22:25)  POCT Blood Glucose.: 146 mg/dL (08 May 2023 17:42)  POCT Blood Glucose.: 132 mg/dL (08 May 2023 16:08)      I&O's Detail    08 May 2023 07:01  -  09 May 2023 07:00  --------------------------------------------------------  IN:    IV PiggyBack: 1000 mL    Oral Fluid: 2750 mL  Total IN: 3750 mL    OUT:    Voided (mL): 4350 mL  Total OUT: 4350 mL    Total NET: -600 mL            Physical Exam:  Gen: NAD  CV: Regular rate when evaluated  Resp: Nonlabored breathing with nasal cannula in place  Buttock: Right buttock wound approximately 5x5cm with areas of fibrinous exudate and no purulent drainage noted, surrounding erythema and induration, off midline  Ext: Warm        Labs:    05-09    129<L>  |  91<L>  |  11  ----------------------------<  112<H>  4.8   |  26  |  0.70    Ca    9.6      09 May 2023 05:27  Phos  4.6     05-09  Mg     2.40     05-09    TPro  7.8  /  Alb  3.5  /  TBili  0.3  /  DBili  x   /  AST  38  /  ALT  57<H>  /  AlkPhos  200<H>  05-09    LIVER FUNCTIONS - ( 09 May 2023 05:27 )  Alb: 3.5 g/dL / Pro: 7.8 g/dL / ALK PHOS: 200 U/L / ALT: 57 U/L / AST: 38 U/L / GGT: x                                 12.4   8.70  )-----------( 330      ( 09 May 2023 05:27 )             37.9

## 2023-05-09 NOTE — PROGRESS NOTE ADULT - ASSESSMENT
57-year-old male with past medical history bipolar, schizophrenia, hypogammaglobulinemia (monthly IVIG), HTN, DM 2, common variable immunodeficiency presenting with 2 weeks of pain/redness/drainage from right sided gluteal wound. He reports was recently at [facility] but left AMA, has been taking clindamycin without much improvement. Surgery consulted to evaluate, previously involved with debridement of left gluteal wound.    Patient is non toxic appearing and does not require urgent debridement.    Plan:  - Recommend continuing abx  - Continue glycemic management and nutritional optimization  - Appreciate heme/onc and derm + rheum recs  - Wound care team to debride wound tomorrow      B Team Surgery  [id number]

## 2023-05-09 NOTE — PROGRESS NOTE ADULT - SUBJECTIVE AND OBJECTIVE BOX
PROGRESS NOTE:    Stephanie Lagunas MD  Internal Medicine PGY-2  Available on [employer] Teams      Patient is a 57y old  Male who presents with a chief complaint of     SUBJECTIVE / OVERNIGHT EVENTS: No acute overnight events. Pt seen and examined. Denies fevers, chills, CP, SOB, Abdominal pain, N/V, Constipation, Diarrhea      MEDICATIONS  (STANDING):  atorvastatin 40 milliGRAM(s) Oral at bedtime  budesonide 160 MICROgram(s)/formoterol 4.5 MICROgram(s) Inhaler 2 Puff(s) Inhalation two times a day  cholecalciferol 2000 Unit(s) Oral daily  dextrose 5%. 1000 milliLiter(s) (100 mL/Hr) IV Continuous <Continuous>  dextrose 5%. 1000 milliLiter(s) (50 mL/Hr) IV Continuous <Continuous>  dextrose 50% Injectable 25 Gram(s) IV Push once  dextrose 50% Injectable 12.5 Gram(s) IV Push once  dextrose 50% Injectable 25 Gram(s) IV Push once  diltiazem    milliGRAM(s) Oral daily  enoxaparin Injectable 40 milliGRAM(s) SubCutaneous every 24 hours  glucagon  Injectable 1 milliGRAM(s) IntraMuscular once  insulin lispro (ADMELOG) corrective regimen sliding scale   SubCutaneous at bedtime  insulin lispro (ADMELOG) corrective regimen sliding scale   SubCutaneous three times a day before meals  losartan 100 milliGRAM(s) Oral daily  vancomycin  IVPB 1250 milliGRAM(s) IV Intermittent every 8 hours    MEDICATIONS  (PRN):  acetaminophen     Tablet .. 975 milliGRAM(s) Oral every 6 hours PRN Temp greater or equal to 38C (100.4F), Mild Pain (1 - 3), Moderate Pain (4 - 6), Severe Pain (7 - 10)  albuterol    90 MICROgram(s) HFA Inhaler 2 Puff(s) Inhalation every 6 hours PRN Shortness of Breath and/or Wheezing  dextrose Oral Gel 15 Gram(s) Oral once PRN Blood Glucose LESS THAN 70 milliGRAM(s)/deciliter  hydrOXYzine hydrochloride 50 milliGRAM(s) Oral at bedtime PRN Anxiety  OLANZapine 5 milliGRAM(s) Oral every 6 hours PRN Agitation  oxyCODONE    IR 5 milliGRAM(s) Oral every 4 hours PRN Moderate Pain (4 - 6)      I&O's Summary    08 May 2023 07:01  -  09 May 2023 07:00  --------------------------------------------------------  IN: 3750 mL / OUT: 4350 mL / NET: -600 mL        Vital Signs Last 24 Hrs  T(C): 36.5 (09 May 2023 05:30), Max: 37 (08 May 2023 17:33)  T(F): 97.7 (09 May 2023 05:30), Max: 98.6 (08 May 2023 17:33)  HR: 93 (09 May 2023 05:30) (77 - 93)  BP: 177/94 (09 May 2023 05:30) (142/83 - 177/94)  BP(mean): --  RR: 19 (09 May 2023 05:30) (17 - 19)  SpO2: 94% (09 May 2023 05:30) (91% - 98%)    Parameters below as of 09 May 2023 05:30  Patient On (Oxygen Delivery Method): room air        =================PHYSICAL EXAM=================    GENERAL: NAD, lying in bed comfortably  HEAD:  Atraumatic, Normocephalic  EYES: EOMI, PERRLA, conjunctiva and sclera clear  ENT: Moist mucous membranes  NECK: Supple, No JVD  CHEST/LUNG: Clear to auscultation bilaterally; No rales, rhonchi, wheezing, or rubs. Unlabored respirations  HEART: Regular rate and rhythm; No murmurs, rubs, or gallops  ABDOMEN: Bowel sounds present; Soft, Nontender, Nondistended. No hepatomegally  EXTREMITIES:  2+ Peripheral Pulses, brisk capillary refill. No clubbing, cyanosis, or edema  NERVOUS SYSTEM:  Alert & Oriented X3, speech clear. No deficits   MSK: FROM all 4 extremities, full and equal strength  SKIN: No rashes or lesions    =================================================    LABS:                        12.4   8.70  )-----------( 330      ( 09 May 2023 05:27 )             37.9     Auto Eosinophil # 0.29  / Auto Eosinophil % 3.3   / Auto Neutrophil # 6.25  / Auto Neutrophil % 71.9  / BANDS % x                            11.7   10.98 )-----------( 275      ( 08 May 2023 05:55 )             35.1     Auto Eosinophil # 0.24  / Auto Eosinophil % 2.2   / Auto Neutrophil # 8.08  / Auto Neutrophil % 73.5  / BANDS % x        05-09    129<L>  |  91<L>  |  11  ----------------------------<  112<H>  4.8   |  26  |  0.70  05-08    127<L>  |  88<L>  |  7   ----------------------------<  104<H>  4.3   |  28  |  0.69    Ca    9.6      09 May 2023 05:27  Mg     2.40     05-09  Phos  4.6     05-09  TPro  7.8  /  Alb  3.5  /  TBili  0.3  /  DBili  x   /  AST  38  /  ALT  57<H>  /  AlkPhos  200<H>  05-09  TPro  6.6  /  Alb  3.4  /  TBili  0.2  /  DBili  x   /  AST  24  /  ALT  25  /  AlkPhos  194<H>  05-08                  RADIOLOGY & ADDITIONAL TESTS:    Imaging Personally Reviewed:    Consultant(s) Notes Reviewed:      Care Discussed with Consultants/Other Providers:   PROGRESS NOTE:    Stephanie Lagunas MD  Internal Medicine PGY-2  Available on [employer] Teams      Patient is a 57y old  Male who presents with a chief complaint of     SUBJECTIVE / OVERNIGHT EVENTS: No acute overnight events. Pt seen and examined. He states that he feels fine.  Denies fevers, chills, CP, SOB, Abdominal pain, N/V, Constipation, Diarrhea      MEDICATIONS  (STANDING):  atorvastatin 40 milliGRAM(s) Oral at bedtime  budesonide 160 MICROgram(s)/formoterol 4.5 MICROgram(s) Inhaler 2 Puff(s) Inhalation two times a day  cholecalciferol 2000 Unit(s) Oral daily  dextrose 5%. 1000 milliLiter(s) (100 mL/Hr) IV Continuous <Continuous>  dextrose 5%. 1000 milliLiter(s) (50 mL/Hr) IV Continuous <Continuous>  dextrose 50% Injectable 25 Gram(s) IV Push once  dextrose 50% Injectable 12.5 Gram(s) IV Push once  dextrose 50% Injectable 25 Gram(s) IV Push once  diltiazem    milliGRAM(s) Oral daily  enoxaparin Injectable 40 milliGRAM(s) SubCutaneous every 24 hours  glucagon  Injectable 1 milliGRAM(s) IntraMuscular once  insulin lispro (ADMELOG) corrective regimen sliding scale   SubCutaneous at bedtime  insulin lispro (ADMELOG) corrective regimen sliding scale   SubCutaneous three times a day before meals  losartan 100 milliGRAM(s) Oral daily  vancomycin  IVPB 1250 milliGRAM(s) IV Intermittent every 8 hours    MEDICATIONS  (PRN):  acetaminophen     Tablet .. 975 milliGRAM(s) Oral every 6 hours PRN Temp greater or equal to 38C (100.4F), Mild Pain (1 - 3), Moderate Pain (4 - 6), Severe Pain (7 - 10)  albuterol    90 MICROgram(s) HFA Inhaler 2 Puff(s) Inhalation every 6 hours PRN Shortness of Breath and/or Wheezing  dextrose Oral Gel 15 Gram(s) Oral once PRN Blood Glucose LESS THAN 70 milliGRAM(s)/deciliter  hydrOXYzine hydrochloride 50 milliGRAM(s) Oral at bedtime PRN Anxiety  OLANZapine 5 milliGRAM(s) Oral every 6 hours PRN Agitation  oxyCODONE    IR 5 milliGRAM(s) Oral every 4 hours PRN Moderate Pain (4 - 6)      I&O's Summary    08 May 2023 07:01  -  09 May 2023 07:00  --------------------------------------------------------  IN: 3750 mL / OUT: 4350 mL / NET: -600 mL        Vital Signs Last 24 Hrs  T(C): 36.5 (09 May 2023 05:30), Max: 37 (08 May 2023 17:33)  T(F): 97.7 (09 May 2023 05:30), Max: 98.6 (08 May 2023 17:33)  HR: 93 (09 May 2023 05:30) (77 - 93)  BP: 177/94 (09 May 2023 05:30) (142/83 - 177/94)  BP(mean): --  RR: 19 (09 May 2023 05:30) (17 - 19)  SpO2: 94% (09 May 2023 05:30) (91% - 98%)    Parameters below as of 09 May 2023 05:30  Patient On (Oxygen Delivery Method): room air        =================PHYSICAL EXAM=================    GENERAL: NAD, sitting up in bed comfortably   HEAD:  Atraumatic, Normocephalic  ENT: Moist mucous membranes  CHEST/LUNG: Clear to auscultation bilaterally; No rales, rhonchi, wheezing, or rubs. Unlabored respirations  HEART: Regular rate and rhythm; No murmurs, rubs, or gallops  ABDOMEN: Bowel sounds present; Soft, Nontender, Nondistended. No hepatomegally  EXTREMITIES:  2+ Peripheral Pulses, brisk capillary refill. No clubbing, cyanosis, or edema  NERVOUS SYSTEM:  Alert & Oriented X3, speech clear  MSK: FROM all 4 extremities, full and equal strength  Psych: Appropriate mood;  flat affect   SKIN: R-gluteus: warm to touch, ~10 cmm erythematous circular wound with  purulence  covered with bandage       =================================================    LABS:                        12.4   8.70  )-----------( 330      ( 09 May 2023 05:27 )             37.9     Auto Eosinophil # 0.29  / Auto Eosinophil % 3.3   / Auto Neutrophil # 6.25  / Auto Neutrophil % 71.9  / BANDS % x                            11.7   10.98 )-----------( 275      ( 08 May 2023 05:55 )             35.1     Auto Eosinophil # 0.24  / Auto Eosinophil % 2.2   / Auto Neutrophil # 8.08  / Auto Neutrophil % 73.5  / BANDS % x        05-09    129<L>  |  91<L>  |  11  ----------------------------<  112<H>  4.8   |  26  |  0.70  05-08    127<L>  |  88<L>  |  7   ----------------------------<  104<H>  4.3   |  28  |  0.69    Ca    9.6      09 May 2023 05:27  Mg     2.40     05-09  Phos  4.6     05-09  TPro  7.8  /  Alb  3.5  /  TBili  0.3  /  DBili  x   /  AST  38  /  ALT  57<H>  /  AlkPhos  200<H>  05-09  TPro  6.6  /  Alb  3.4  /  TBili  0.2  /  DBili  x   /  AST  24  /  ALT  25  /  AlkPhos  194<H>  05-08                  RADIOLOGY & ADDITIONAL TESTS:    Imaging Personally Reviewed:    Consultant(s) Notes Reviewed:      Care Discussed with Consultants/Other Providers:

## 2023-05-09 NOTE — BH CONSULTATION LIAISON PROGRESS NOTE - NSBHATTESTCOMMENTATTENDFT_PSY_A_CORE
Chart reviewed. Seen with resident. Pleasant, agreeable to care, though concrete in thinking. Agree with above assessment/recs. Will continue to follow.

## 2023-05-09 NOTE — PROGRESS NOTE ADULT - PROBLEM SELECTOR PLAN 3
On admission Diltiazem reduced to 180 daily, however he had elevated BPs in the past 24 hrs     - C/W Home Losartan 75mg PO QD  -c/w  Home Diltiazem ER 300mg PO QD

## 2023-05-09 NOTE — PROGRESS NOTE ADULT - ASSESSMENT
57 m with bipolar, schizophrenia, HTN,  bronchiectasis, CVID, hypogammaglobulinemia (monthly IVIG), previous LLE cellulitis/abscess and MRSA bacteremia, went to [facility] for R buttock wound, cellulitis but signed out AMA and was given clinda now p/w worsening pain/edema  here afebrile, WBC: 15  abd/pelvis CT: buttock cellulitis, no abscess seen  blood cx: MRSA    leukocytosis, MRSA bacteremia due to buttock cellulitis and abscess in the setting of CVID, on monthly IVIG and previous MRSA bacteremia with abscess  repeat blood cx negative 5/8, TTE unable to exclude endocarditis     * surgery did not recommend I&D and now plan for debridement by wound care  * change vanco to 1250 q 12 (based on AUC calculation) and check the trough before the 4th dose  * if persistent bacteremia then will need LINDA  * monitor CBC/diff and renal function      The above assessment and plan was discussed with the primary team    Genesis Ramirez MD  contact on teams  After 5pm and on weekends call [phone]

## 2023-05-09 NOTE — PROGRESS NOTE ADULT - PROBLEM SELECTOR PLAN 4
PT with h/o CVID requiring IgG infusions q1mo (last 4/13/2023).  OP Immunologist: Dr. Mitchell Boxer ([facility])  IGg level low at 489   s/p gammagard S/D 75 grams yesterday    - Allergy and Immunology recs appreciated   -recorded allergy to amoxicillin and penicillin however tolerated augmentin in the past, may re-address allergy if he needs a beta lactem

## 2023-05-10 ENCOUNTER — APPOINTMENT (OUTPATIENT)
Dept: ALLERGY | Facility: CLINIC | Age: 58
End: 2023-05-10

## 2023-05-10 DIAGNOSIS — S31.819A UNSPECIFIED OPEN WOUND OF RIGHT BUTTOCK, INITIAL ENCOUNTER: ICD-10-CM

## 2023-05-10 LAB
ALBUMIN SERPL ELPH-MCNC: 3.3 G/DL — SIGNIFICANT CHANGE UP (ref 3.3–5)
ALP SERPL-CCNC: 181 U/L — HIGH (ref 40–120)
ALT FLD-CCNC: 69 U/L — HIGH (ref 4–41)
ANION GAP SERPL CALC-SCNC: 12 MMOL/L — SIGNIFICANT CHANGE UP (ref 7–14)
APTT BLD: 31.3 SEC — SIGNIFICANT CHANGE UP (ref 27–36.3)
AST SERPL-CCNC: 38 U/L — SIGNIFICANT CHANGE UP (ref 4–40)
BILIRUB SERPL-MCNC: 0.5 MG/DL — SIGNIFICANT CHANGE UP (ref 0.2–1.2)
BUN SERPL-MCNC: 10 MG/DL — SIGNIFICANT CHANGE UP (ref 7–23)
CALCIUM SERPL-MCNC: 9.3 MG/DL — SIGNIFICANT CHANGE UP (ref 8.4–10.5)
CHLORIDE SERPL-SCNC: 89 MMOL/L — LOW (ref 98–107)
CO2 SERPL-SCNC: 25 MMOL/L — SIGNIFICANT CHANGE UP (ref 22–31)
CREAT SERPL-MCNC: 0.67 MG/DL — SIGNIFICANT CHANGE UP (ref 0.5–1.3)
EGFR: 110 ML/MIN/1.73M2 — SIGNIFICANT CHANGE UP
GLUCOSE BLDC GLUCOMTR-MCNC: 106 MG/DL — HIGH (ref 70–99)
GLUCOSE BLDC GLUCOMTR-MCNC: 117 MG/DL — HIGH (ref 70–99)
GLUCOSE BLDC GLUCOMTR-MCNC: 141 MG/DL — HIGH (ref 70–99)
GLUCOSE SERPL-MCNC: 104 MG/DL — HIGH (ref 70–99)
INR BLD: 1.18 RATIO — HIGH (ref 0.88–1.16)
MAGNESIUM SERPL-MCNC: 2.1 MG/DL — SIGNIFICANT CHANGE UP (ref 1.6–2.6)
PHOSPHATE SERPL-MCNC: 3.9 MG/DL — SIGNIFICANT CHANGE UP (ref 2.5–4.5)
POTASSIUM SERPL-MCNC: 4.6 MMOL/L — SIGNIFICANT CHANGE UP (ref 3.5–5.3)
POTASSIUM SERPL-SCNC: 4.6 MMOL/L — SIGNIFICANT CHANGE UP (ref 3.5–5.3)
PROT SERPL-MCNC: 7.3 G/DL — SIGNIFICANT CHANGE UP (ref 6–8.3)
PROTHROM AB SERPL-ACNC: 13.7 SEC — HIGH (ref 10.5–13.4)
SODIUM SERPL-SCNC: 126 MMOL/L — LOW (ref 135–145)
VANCOMYCIN TROUGH SERPL-MCNC: 5.5 UG/ML — LOW (ref 10–20)

## 2023-05-10 PROCEDURE — 99232 SBSQ HOSP IP/OBS MODERATE 35: CPT | Mod: GC

## 2023-05-10 PROCEDURE — 99232 SBSQ HOSP IP/OBS MODERATE 35: CPT

## 2023-05-10 PROCEDURE — 11042 DBRDMT SUBQ TIS 1ST 20SQCM/<: CPT

## 2023-05-10 PROCEDURE — 88304 TISSUE EXAM BY PATHOLOGIST: CPT | Mod: 26

## 2023-05-10 PROCEDURE — 11045 DBRDMT SUBQ TISS EACH ADDL: CPT

## 2023-05-10 RX ORDER — MORPHINE SULFATE 50 MG/1
5 CAPSULE, EXTENDED RELEASE ORAL ONCE
Refills: 0 | Status: DISCONTINUED | OUTPATIENT
Start: 2023-05-10 | End: 2023-05-10

## 2023-05-10 RX ORDER — VANCOMYCIN HCL 1 G
1500 VIAL (EA) INTRAVENOUS ONCE
Refills: 0 | Status: COMPLETED | OUTPATIENT
Start: 2023-05-10 | End: 2023-05-10

## 2023-05-10 RX ORDER — VANCOMYCIN HCL 1 G
VIAL (EA) INTRAVENOUS
Refills: 0 | Status: DISCONTINUED | OUTPATIENT
Start: 2023-05-10 | End: 2023-05-11

## 2023-05-10 RX ORDER — VANCOMYCIN HCL 1 G
1500 VIAL (EA) INTRAVENOUS EVERY 8 HOURS
Refills: 0 | Status: DISCONTINUED | OUTPATIENT
Start: 2023-05-11 | End: 2023-05-11

## 2023-05-10 RX ADMIN — LOSARTAN POTASSIUM 100 MILLIGRAM(S): 100 TABLET, FILM COATED ORAL at 06:14

## 2023-05-10 RX ADMIN — Medication 1 APPLICATION(S): at 06:14

## 2023-05-10 RX ADMIN — Medication 30 MILLILITER(S): at 14:01

## 2023-05-10 RX ADMIN — BUDESONIDE AND FORMOTEROL FUMARATE DIHYDRATE 2 PUFF(S): 160; 4.5 AEROSOL RESPIRATORY (INHALATION) at 22:21

## 2023-05-10 RX ADMIN — Medication 300 MILLIGRAM(S): at 06:14

## 2023-05-10 RX ADMIN — ATORVASTATIN CALCIUM 40 MILLIGRAM(S): 80 TABLET, FILM COATED ORAL at 22:21

## 2023-05-10 RX ADMIN — BUDESONIDE AND FORMOTEROL FUMARATE DIHYDRATE 2 PUFF(S): 160; 4.5 AEROSOL RESPIRATORY (INHALATION) at 11:37

## 2023-05-10 RX ADMIN — Medication 166.67 MILLIGRAM(S): at 11:41

## 2023-05-10 RX ADMIN — Medication 2000 UNIT(S): at 11:38

## 2023-05-10 RX ADMIN — Medication 1 APPLICATION(S): at 17:02

## 2023-05-10 RX ADMIN — Medication 300 MILLIGRAM(S): at 23:01

## 2023-05-10 RX ADMIN — ARIPIPRAZOLE 5 MILLIGRAM(S): 15 TABLET ORAL at 11:38

## 2023-05-10 NOTE — DIETITIAN INITIAL EVALUATION ADULT - PERTINENT LABORATORY DATA
(5/10) Na 126<L>, BUN 10, Cr 0.67, <H>, K+ 4.6, Phos 3.9, Mg 2.10, Alk Phos 181<H>, ALT/SGPT 69<H>, AST/SGOT 38  (5/8) HbA1c 6.3%<H>  POCT: (5/10) 106-117, (5/9) 110-189

## 2023-05-10 NOTE — BH CONSULTATION LIAISON PROGRESS NOTE - NSBHASSESSMENTFT_PSY_ALL_CORE
56M, domiciled in supportive housing TSI, SSD, single, PMH HTN, DM, hypogammaglobulinemia, PPHx schizoaffective d/o, bipolar d/o, hx of multiple psych hospitalizations (last known in 2020 at [facility]), denies hx SA/NSSIB, denies drug or alcohol use, denies legal hx, who presented with right gluteal wound, admitted to medicine for cellulutis. Psychiatry consulted for psychosis/schizophrenia.    On assessment, patient is calm and cooperative. AAO3. No overt symptoms of psychosis, depression, or yo. No S/I or H/I. Patient w/ recurrent/chronic infections. Patient has left multiple hospitals AMA while undergoing treatment for cellulitis. However, patient recognizes benefit of continuing w/ antibiotic treatment, as well as risks of discontinuing treatment. Patient not currently refusing treatment or attempting to leave AMA. Patient interested in reconnection to outpatient care after discharge. Patient was due for Haldol decanoate 150mg on 4/16. Will obtain collateral see when last received.     5/9: no interval change. no overt positive symptoms of psychosis. patient w/ likely negative/cognitive symptoms that impair ability to adequately care for self (e.g. likely contribute to poor hygiene, recurrent infections, etc.). no si or hi. interested in resuming outpatient care and restarting psychotropics. recommend aripiprazole 5mg qd for psychosis/mood (w/ reported history of depression).     5/10: status quo. no psychosis or depression. patient w/ impaired ability to care for self, likely a result of negative/cognitive symptoms. per chart review, patient has not seen outpt psychiatrist since 9/22. cannot leave AMA. will consider inpatient psych once medically cleared.     Plan:  - Routine observation, no psychiatric indication for CO 1:1  - c/w aripiprazole 5mg qd for psychosis/mood  - If patient refuses treatment or attempts to leave AMA, psych can reassess for capacity   - Zyprexa 2.5-5mg PO/IM/IV q6hrs PRN for agitation  - Dispo: when medically cleared; will consider inpatient psych due to impaired ability to care for self (impaired hygiene, recurrent infections requiring hospitalization, etc)    56M, domiciled in supportive housing TSI, SSD, single, PMH HTN, DM, hypogammaglobulinemia, PPHx schizoaffective d/o, bipolar d/o, hx of multiple psych hospitalizations (last known in 2020 at [facility]), denies hx SA/NSSIB, denies drug or alcohol use, denies legal hx, who presented with right gluteal wound, admitted to medicine for cellulutis. Psychiatry consulted for psychosis/schizophrenia.    On assessment, patient is calm and cooperative. AAO3. No overt symptoms of psychosis, depression, or yo. No S/I or H/I. Patient w/ recurrent/chronic infections. Patient has left multiple hospitals AMA while undergoing treatment for cellulitis. However, patient recognizes benefit of continuing w/ antibiotic treatment, as well as risks of discontinuing treatment. Patient not currently refusing treatment or attempting to leave AMA. Patient interested in reconnection to outpatient care after discharge. Patient was due for Haldol decanoate 150mg on 4/16. Will obtain collateral see when last received.     5/9: no interval change. no overt positive symptoms of psychosis. patient w/ likely negative/cognitive symptoms that impair ability to adequately care for self (e.g. likely contribute to poor hygiene, recurrent infections, etc.). no si or hi. interested in resuming outpatient care and restarting psychotropics. recommend aripiprazole 5mg qd for psychosis/mood (w/ reported history of depression).     5/10: status quo. no psychosis or depression. patient w/ impaired ability to care for self, likely a result of negative/cognitive symptoms. per chart review, patient has not seen outpt psychiatrist since 9/22. cannot leave AMA. will consider inpatient psych once medically cleared.     Plan:  - Routine observation, no psychiatric indication for CO 1:1  - c/w aripiprazole 5mg qd for psychosis/mood  - If patient refuses treatment or attempts to leave AMA, psych can reassess for capacity   - Zyprexa 2.5-5mg PO/IM/IV q6hrs PRN for agitation  - CANNOT LEAVE AMA  - Dispo: when medically cleared; will consider inpatient psych due to impaired ability to care for self (impaired hygiene, recurrent infections requiring hospitalization, etc)    56M, domiciled in supportive housing TSI, SSD, single, PMH HTN, DM, hypogammaglobulinemia, PPHx schizoaffective d/o, bipolar d/o, hx of multiple psych hospitalizations (last known in 2020 at [facility]), denies hx SA/NSSIB, denies drug or alcohol use, denies legal hx, who presented with right gluteal wound, admitted to medicine for cellulutis. Psychiatry consulted for psychosis/schizophrenia.    On assessment, patient is calm and cooperative. AAO3. No overt symptoms of psychosis, depression, or yo. No S/I or H/I. Patient w/ recurrent/chronic infections. Patient has left multiple hospitals AMA while undergoing treatment for cellulitis. However, patient recognizes benefit of continuing w/ antibiotic treatment, as well as risks of discontinuing treatment. Patient not currently refusing treatment or attempting to leave AMA. Patient interested in reconnection to outpatient care after discharge. Patient was due for Haldol decanoate 150mg on 4/16. Will obtain collateral see when last received.     5/9: no interval change. no overt positive symptoms of psychosis. patient w/ likely negative/cognitive symptoms that impair ability to adequately care for self (e.g. likely contribute to poor hygiene, recurrent infections, etc.). no si or hi. interested in resuming outpatient care and restarting psychotropics. recommend aripiprazole 5mg qd for psychosis/mood (w/ reported history of depression).     5/10: status quo. no psychosis or depression. patient w/ impaired ability to care for self, likely a result of negative/cognitive symptoms. per chart review, patient has not seen outpt psychiatrist since 9/22. cannot leave AMA. will consider inpatient psych once medically cleared.     Plan:  - Routine observation, no psychiatric indication for CO 1:1  - c/w aripiprazole 5mg qd for psychosis/mood  - If patient refuses treatment or attempts to leave AMA, psych can reassess for capacity   - Zyprexa 2.5-5mg PO/IM/IV q6hrs PRN for agitation  - CANNOT LEAVE AMA  - Dispo: when medically cleared will pursue inpatient psych due to impaired ability to care for self (impaired hygiene, recurrent infections requiring hospitalization, etc)

## 2023-05-10 NOTE — PROGRESS NOTE ADULT - PROBLEM SELECTOR PLAN 2
Historically diagnosed. Pt confirms schizophrenia but denies being on any active psychotropic medications. Denies AH/VH. Denies SI/HI. Does not specify why he left AMA from other hospital, but does understand that he needs help here. historically lacked capacity to leave AMA, but not trying at this time.  - No need for 1:1 at this time  - Psychiatry/ Consult recs appreciated  -start aripiprazole 5mg qd for psychosis/mood  - If pt tries to leave AMA, Psych can assess capacity (incl. judgement/understanding of risk/benefit to incomplete treatment)  - C/W zyrprexa 5 PO for agitation Historically diagnosed. Pt confirms schizophrenia but denies being on any active psychotropic medications. Denies AH/VH. Denies SI/HI. Does not specify why he left AMA from other hospital, but does understand that he needs help here. historically lacked capacity to leave AMA, but not trying at this time.  - No need for 1:1 at this time  - Psychiatry/ Consult recs appreciated  - c/w aripiprazole 5mg qd for psychosis/mood  - C/W zyrprexa 5 PO for agitation  - Per , pt cannot leave AMA at this time. Will be evaluated for inpatient psych after medically cleared

## 2023-05-10 NOTE — PROGRESS NOTE ADULT - SUBJECTIVE AND OBJECTIVE BOX
Kinsey Camacho MD    Internal Medicine Resident (PGY-2)  ___________________________________________________________________________________________________      DAT WOOD 57y Male    Overnight events/subjective: No acute overnight events. Patient seen and examined at bedside. No complaints. Denies fever, chills, chest pain, shortness of breath, abdominal pain, nausea, vomiting, changes in bowel habits, or urinary symptoms.    Vital Signs Last 24 Hrs  T(C): 36.6 (10 May 2023 06:02), Max: 37 (09 May 2023 15:10)  T(F): 97.9 (10 May 2023 06:02), Max: 98.6 (09 May 2023 15:10)  HR: 77 (10 May 2023 06:02) (77 - 88)  BP: 153/94 (10 May 2023 06:02) (153/84 - 164/71)  BP(mean): --  RR: 18 (10 May 2023 06:02) (18 - 19)  SpO2: 93% (10 May 2023 06:02) (92% - 94%)    Parameters below as of 10 May 2023 06:02  Patient On (Oxygen Delivery Method): room air        PHYSICAL EXAM:  GENERAL: no acute distress  HEENT - atraumatic, normocephalic, pupils equal and reactive to light; extraocular muscles intact  CV: regular rate and rhythym; normal S1/S2; nor murmurs, rubs, or gallops  PULM: clear to auscultation bilaterally; no rales, rhonchi, wheezing  ABDOMEN: soft, nontender, nondistended; bowel sounds present  MSK: extremities atraumatic; ny cyanosis or clubbing  SKIN: warm, dry, no rashes or lesions  NEURO:  no focal deficits, sensory and motor grossly intact  PSYCH: alert and oriented x3; appropriate mood and affect    HOSPITAL MEDICATIONS:  MEDICATIONS  (STANDING):  ARIPiprazole 5 milliGRAM(s) Oral daily  atorvastatin 40 milliGRAM(s) Oral at bedtime  budesonide 160 MICROgram(s)/formoterol 4.5 MICROgram(s) Inhaler 2 Puff(s) Inhalation two times a day  cholecalciferol 2000 Unit(s) Oral daily  Dakins Solution - 1/4 Strength 1 Application(s) Topical two times a day  diltiazem    milliGRAM(s) Oral daily  lidocaine 1% Injectable 30 milliLiter(s) Local Injection once  losartan 100 milliGRAM(s) Oral daily  vancomycin  IVPB 1250 milliGRAM(s) IV Intermittent every 12 hours    MEDICATIONS  (PRN):  acetaminophen     Tablet .. 975 milliGRAM(s) Oral every 6 hours PRN Temp greater or equal to 38C (100.4F), Mild Pain (1 - 3), Moderate Pain (4 - 6), Severe Pain (7 - 10)  albuterol    90 MICROgram(s) HFA Inhaler 2 Puff(s) Inhalation every 6 hours PRN Shortness of Breath and/or Wheezing  OLANZapine 5 milliGRAM(s) Oral every 6 hours PRN Agitation  oxyCODONE    IR 5 milliGRAM(s) Oral every 4 hours PRN Moderate Pain (4 - 6)      LABS:                        12.4   8.70  )-----------( 330      ( 09 May 2023 05:27 )             37.9     05-09    129<L>  |  91<L>  |  11  ----------------------------<  112<H>  4.8   |  26  |  0.70    Ca    9.6      09 May 2023 05:27  Phos  4.6     05-09  Mg     2.40     05-09    TPro  7.8  /  Alb  3.5  /  TBili  0.3  /  DBili  x   /  AST  38  /  ALT  57<H>  /  AlkPhos  200<H>  05-09           Kinsey Camacho MD    Internal Medicine Resident (PGY-2)  ___________________________________________________________________________________________________      DAT WOOD 57y Male    Overnight events/subjective: No acute overnight events. Patient seen and examined at bedside. No complaints this AM. Denies pain.    Vital Signs Last 24 Hrs  T(C): 36.6 (10 May 2023 06:02), Max: 37 (09 May 2023 15:10)  T(F): 97.9 (10 May 2023 06:02), Max: 98.6 (09 May 2023 15:10)  HR: 77 (10 May 2023 06:02) (77 - 88)  BP: 153/94 (10 May 2023 06:02) (153/84 - 164/71)  BP(mean): --  RR: 18 (10 May 2023 06:02) (18 - 19)  SpO2: 93% (10 May 2023 06:02) (92% - 94%)    Parameters below as of 10 May 2023 06:02  Patient On (Oxygen Delivery Method): room air      PHYSICAL EXAM:  GENERAL: no acute distress  HEENT - atraumatic, normocephalic, EOMI  CV: RRR, no murmurs  PULM: CTAB  ABDOMEN: soft, nontender, nondistended  MSK: extremities atraumatic; no cyanosis or clubbing  SKIN: R gluteal wound with surrounding erythema  NEURO:  no focal deficits, sensory and motor grossly intact  PSYCH: alert and oriented x3    HOSPITAL MEDICATIONS:  MEDICATIONS  (STANDING):  ARIPiprazole 5 milliGRAM(s) Oral daily  atorvastatin 40 milliGRAM(s) Oral at bedtime  budesonide 160 MICROgram(s)/formoterol 4.5 MICROgram(s) Inhaler 2 Puff(s) Inhalation two times a day  cholecalciferol 2000 Unit(s) Oral daily  Dakins Solution - 1/4 Strength 1 Application(s) Topical two times a day  diltiazem    milliGRAM(s) Oral daily  lidocaine 1% Injectable 30 milliLiter(s) Local Injection once  losartan 100 milliGRAM(s) Oral daily  vancomycin  IVPB 1250 milliGRAM(s) IV Intermittent every 12 hours    MEDICATIONS  (PRN):  acetaminophen     Tablet .. 975 milliGRAM(s) Oral every 6 hours PRN Temp greater or equal to 38C (100.4F), Mild Pain (1 - 3), Moderate Pain (4 - 6), Severe Pain (7 - 10)  albuterol    90 MICROgram(s) HFA Inhaler 2 Puff(s) Inhalation every 6 hours PRN Shortness of Breath and/or Wheezing  OLANZapine 5 milliGRAM(s) Oral every 6 hours PRN Agitation  oxyCODONE    IR 5 milliGRAM(s) Oral every 4 hours PRN Moderate Pain (4 - 6)      LABS:                        12.4   8.70  )-----------( 330      ( 09 May 2023 05:27 )             37.9     05-09    129<L>  |  91<L>  |  11  ----------------------------<  112<H>  4.8   |  26  |  0.70    Ca    9.6      09 May 2023 05:27  Phos  4.6     05-09  Mg     2.40     05-09    TPro  7.8  /  Alb  3.5  /  TBili  0.3  /  DBili  x   /  AST  38  /  ALT  57<H>  /  AlkPhos  200<H>  05-09

## 2023-05-10 NOTE — PROCEDURE NOTE - NSICDXPROCEDURE_GEN_ALL_CORE_FT
PROCEDURES:  Irrigation and excisional debridement of tissue including subcutaneous tissue 10-May-2023 14:35:34  Lalo Marmolejo  Incisional biopsy, lesion, skin 10-May-2023 14:36:29  Lalo Marmolejo

## 2023-05-10 NOTE — DIETITIAN INITIAL EVALUATION ADULT - ADD RECOMMEND
Recommend continue consistent carbohydrate diet, fluid restriction per medical discretion. RDN remains available to provide nutrition education as appropriate.

## 2023-05-10 NOTE — PROGRESS NOTE ADULT - SUBJECTIVE AND OBJECTIVE BOX
Surgery Progress Note     Subjective/24hour Events:   Patient seen and examined. No new complaints.        Vital Signs:  Vital Signs Last 24 Hrs  T(C): 36.6 (10 May 2023 06:02), Max: 37 (09 May 2023 15:10)  T(F): 97.9 (10 May 2023 06:02), Max: 98.6 (09 May 2023 15:10)  HR: 77 (10 May 2023 06:02) (77 - 88)  BP: 153/94 (10 May 2023 06:02) (153/84 - 164/71)  BP(mean): --  RR: 18 (10 May 2023 06:02) (18 - 19)  SpO2: 93% (10 May 2023 06:02) (92% - 94%)    Parameters below as of 10 May 2023 06:02  Patient On (Oxygen Delivery Method): room air        CAPILLARY BLOOD GLUCOSE      POCT Blood Glucose.: 106 mg/dL (10 May 2023 09:19)  POCT Blood Glucose.: 117 mg/dL (10 May 2023 00:37)  POCT Blood Glucose.: 154 mg/dL (09 May 2023 17:54)  POCT Blood Glucose.: 189 mg/dL (09 May 2023 12:46)      I&O's Detail    09 May 2023 07:01  -  10 May 2023 07:00  --------------------------------------------------------  IN:    IV PiggyBack: 250 mL    Oral Fluid: 920 mL  Total IN: 1170 mL    OUT:    Voided (mL): 2850 mL  Total OUT: 2850 mL    Total NET: -1680 mL            Physical Exam:  Gen: NAD  CV: Regular rate when evaluated  Resp: Nonlabored breathing with nasal cannula in place  Buttock: Right buttock wound approximately 5x5cm with areas of fibrinous exudate and no purulent drainage noted, surrounding erythema and induration, off midline  Ext: Warm        Labs:    05-10    126<L>  |  89<L>  |  10  ----------------------------<  104<H>  4.6   |  25  |  0.67    Ca    9.3      10 May 2023 07:36  Phos  3.9     05-10  Mg     2.10     05-10    TPro  7.3  /  Alb  3.3  /  TBili  0.5  /  DBili  x   /  AST  38  /  ALT  69<H>  /  AlkPhos  181<H>  05-10    LIVER FUNCTIONS - ( 10 May 2023 07:36 )  Alb: 3.3 g/dL / Pro: 7.3 g/dL / ALK PHOS: 181 U/L / ALT: 69 U/L / AST: 38 U/L / GGT: x                                 12.4   8.70  )-----------( 330      ( 09 May 2023 05:27 )             37.9     PT/INR - ( 10 May 2023 07:36 )   PT: 13.7 sec;   INR: 1.18 ratio         PTT - ( 10 May 2023 07:36 )  PTT:31.3 sec

## 2023-05-10 NOTE — PROGRESS NOTE ADULT - SUBJECTIVE AND OBJECTIVE BOX
[facility]-- WOUND TEAM -- FOLLOW UP NOTE  --------------------------------------------------------------------------------    Interval HPI/24 hour events: Chart reviewed including labs and relevant images  Patient reporting no pain at rest, minimal pain with dressing changes. He denies any fevers/chills. He was resting naked in bed on presentation, and said he was quite comfortable.   In the interim, he was seen by Behavioral Health and determined to have medical decision-making capacity. He remains amenable to excisional wound debridement and biopsy.       Diet:  Diet, Consistent Carbohydrate w/Evening Snack:   1000mL Fluid Restriction ([id number]) (05-10-23 @ 09:51)      ROS:   CONSTITUTIONAL:  No fever. No chills. No dizziness. No weakness.    HEENT:  No pain, erythema, or discharge. No blurring of vision. No sore throat, URI symptoms. No epistaxis. No tinnitus.    CARDIOVASCULAR:  No chest pain. No palpitations. No lower extremity edema.    RESPIRATORY:  No shortness of breath, cough, pain with respiration, pleuritic chest pain. No hemoptysis. No dyspnea. No paroxysmal nocturnal dyspnea.    GASTROINTESTINAL:  Normal appetite. No nausea, vomiting, diarrhea. No pain. No bloating. No melena.    GENITOURINARY:  No frequency, urgency, nocturia. No hematuria or dysuria.    MUSCULOSKELETAL:  No arthralgias or myalgias. No weakness.     INTEGUMENTARY:  Wounds/lesions as described in HPI    NEUROLOGIC:  No headache. No neck pain. No numbness or tingling of the extremities. No weakness.    PSYCHIATRIC:  No reported depression or anxiety.     ENDOCRINE:  No fatigue. No weakness. No history of thyroid, diabetes or adrenal problems.    HEMATOLOGICAL:  No bleeding. No petechiae. No bruising.      ALLERGIES & MEDICATIONS  --------------------------------------------------------------------------------  Allergies    amoxicillin (Fever)  penicillin (Rash)    Intolerances          STANDING INPATIENT MEDICATIONS    ARIPiprazole 5 milliGRAM(s) Oral daily  atorvastatin 40 milliGRAM(s) Oral at bedtime  budesonide 160 MICROgram(s)/formoterol 4.5 MICROgram(s) Inhaler 2 Puff(s) Inhalation two times a day  cholecalciferol 2000 Unit(s) Oral daily  Dakins Solution - 1/4 Strength 1 Application(s) Topical two times a day  diltiazem    milliGRAM(s) Oral daily  losartan 100 milliGRAM(s) Oral daily  morphine  - Injectable 5 milliGRAM(s) IV Push once  vancomycin  IVPB 1250 milliGRAM(s) IV Intermittent every 12 hours      PRN INPATIENT MEDICATION  acetaminophen     Tablet .. 975 milliGRAM(s) Oral every 6 hours PRN  albuterol    90 MICROgram(s) HFA Inhaler 2 Puff(s) Inhalation every 6 hours PRN  OLANZapine 5 milliGRAM(s) Oral every 6 hours PRN  oxyCODONE    IR 5 milliGRAM(s) Oral every 4 hours PRN        Vital signs:  T(C): 36.6 (05-10-23 @ 06:02), Max: 37 (05-09-23 @ 15:10)  HR: 77 (05-10-23 @ 06:02) (77 - 88)  BP: 153/94 (05-10-23 @ 06:02) (153/84 - 164/71)  RR: 18 (05-10-23 @ 06:02) (18 - 19)  SpO2: 93% (05-10-23 @ 06:02) (92% - 94%)  Wt(kg): --        05-09-23 @ 07:01  -  05-10-23 @ 07:00  --------------------------------------------------------  IN: 1170 mL / OUT: 2850 mL / NET: -1680 mL    05-10-23 @ 07:01  -  05-10-23 @ 14:39  --------------------------------------------------------  IN: 0 mL / OUT: 600 mL / NET: -600 mL          PHYSICAL EXAM  GEN: NAD, alert and oriented x 3  HEENT: WNL  CHEST: Symmetrical chest rise, no increased work of breathing, no stridor.  HEART: RRR, non-muffled heart sounds  ABD: Obese, soft, non-distended, non-tender.   EXT: No erythema/edema. Warm, sensate, motor function intact.   INTEGUMENT/WOUND(S):  R Buttock  - Open wound, measuring 6 x 8.5 x 2.2 cm pre-debridement.   - Mix of coagulative and liquefactive necrosis of dermis and subcutaneous tissues present.   - Wound edge appears erosive and irregular, with multiple immediately adjacent satellite ulcerations.   - Minimal suppuration.   - Surrounding skin is erythematous, blanching with brisk refill, indurated and minimally tender. No fluctuance.   - Small nodular lesion to the midline of  the open wound, adjacent to the gluteal cleft, measuring 1 x 1.5 cm. Lesion is soft, without ulceration.   - No crepitus/bullae  L Lateral Gaiter Region  - Measures 2 x 1.5 x 0.1 cm  - Clean, scant exudate. Base is healthy granulation tissue.   - Hyperkeratosis and hyperpigmentation of surrounding skin.   - No erythema, induration, suppuration, malodor, crepitus or bullae present     - Well-healed I&D site of inner L buttock.         LABS/ CULTURES/ RADIOLOGY:              12.4   8.70  >-----------<  330      [05-09-23 @ 05:27]              37.9     126  |  89  |  10  ----------------------------<  104      [05-10-23 @ 07:36]  4.6   |  25  |  0.67        Ca     9.3     [05-10-23 @ 07:36]      Mg     2.10     [05-10-23 @ 07:36]      Phos  3.9     [05-10-23 @ 07:36]    TPro  7.3  /  Alb  3.3  /  TBili  0.5  /  DBili  x   /  AST  38  /  ALT  69  /  AlkPhos  181  [05-10-23 @ 07:36]    PT/INR: PT 13.7 , INR 1.18       [05-10-23 @ 07:36]  PTT: 31.3       [05-10-23 @ 07:36]          CAPILLARY BLOOD GLUCOSE      POCT Blood Glucose.: 141 mg/dL (10 May 2023 12:53)  POCT Blood Glucose.: 106 mg/dL (10 May 2023 09:19)  POCT Blood Glucose.: 117 mg/dL (10 May 2023 00:37)  POCT Blood Glucose.: 154 mg/dL (09 May 2023 17:54)            Vitamin D, 25-Hydroxy: 30.3 ng/mL (05-08-23 @ 05:55)        Culture - Blood (collected 05-08-23 @ 06:03)  Source: .Blood Blood-Peripheral  Preliminary Report (05-09-23 @ 09:02):    No growth to date.    Culture - Blood (collected 05-08-23 @ 05:55)  Source: .Blood Blood-Peripheral  Preliminary Report (05-09-23 @ 09:02):    No growth to date.    Culture - Blood (collected 05-06-23 @ 14:50)  Source: .Blood Blood-Peripheral  Gram Stain (05-07-23 @ 17:23):    Growth in aerobic bottle: Gram Positive Cocci in Clusters  Final Report (05-09-23 @ 09:51):    Growth in aerobic bottle: Methicillin Resistant Staphylococcus aureus    ***Blood Panel PCR results on this specimen are available    approximately 3 hours after the Gram stain result.***    Gram stain, PCR, and/or culture results may not always    correspond due to difference in methodologies.    ************************************************************    This PCR assay was performed by multiplex PCR. This    Assay tests for 66 bacterial and resistance gene targets.    Please refer to the [facility] test directory    at [email or web address] for details.  Organism: Blood Culture PCR  Methicillin resistant Staphylococcus aureus (05-09-23 @ 09:51)  Organism: Methicillin resistant Staphylococcus aureus (05-09-23 @ 09:51)      Method Type: HATTIE      -  Ampicillin/Sulbactam: R 16/8      -  Cefazolin: R <=4      -  Clindamycin: R >4      -  Daptomycin: S 0.5      -  Erythromycin: R >4      -  Gentamicin: S <=1 Should not be used as monotherapy      -  Linezolid: S 2      -  Oxacillin: R >2      -  Penicillin: R >8      -  Rifampin: S <=1 Should not be used as monotherapy      -  Tetracycline: R >8      -  Trimethoprim/Sulfamethoxazole: S <=0.5/9.5      -  Vancomycin: S 2  Organism: Blood Culture PCR (05-09-23 @ 09:51)      Method Type: PCR      -  Methicillin resistant Staphylococcus aureus (MRSA): Detec          A1C with Estimated Average Glucose Result: 6.3 % (05-08-23 @ 05:55)  A1C with Estimated Average Glucose Result: 6.3 % (02-05-23 @ 10:43)

## 2023-05-10 NOTE — DIETITIAN INITIAL EVALUATION ADULT - OTHER CALCULATIONS
Dosing weight (5/7) 290.1 lbs / 131.6 kg.   Weight history, per HIE: (3/19) 298 lbs, (2/4) 283 lbs.   Ideal Body Weight: 190 lbs / 86.4 kg +/-10%

## 2023-05-10 NOTE — PROGRESS NOTE ADULT - PROBLEM SELECTOR PLAN 4
PT with h/o CVID requiring IgG infusions q1mo (last 4/13/2023).  OP Immunologist: Dr. Mitchell Boxer ([facility])  IGg level low at 489   s/p gammagard S/D 75 grams yesterday    - Allergy and Immunology recs appreciated   -recorded allergy to amoxicillin and penicillin however tolerated augmentin in the past, may re-address allergy if he needs a beta lactem PT with h/o CVID requiring IgG infusions q1mo (last 4/13/2023).  OP Immunologist: Dr. Mitchell Boxer ([facility])  IGg level low at 489   s/p gammagard 75 grams on 5/8. D/w A&I fellow Dr. Kimura, no need to re-dose with gammagard S/D at this time. Can resume usual monthly infusions after discharge.    - Allergy and Immunology recs appreciated   -recorded allergy to amoxicillin and penicillin however tolerated augmentin in the past, may re-address allergy if he needs a beta lactem

## 2023-05-10 NOTE — PROGRESS NOTE ADULT - ATTENDING COMMENTS
R Buttock Open and Infected cellulitic STI Wound probable MRSA s/p culture on monday   presentation of an abscess/ Irregular edge  , given patient's lack of baseline pain and minimal pain with dressing changes, lack of violaceous coloration, and prior I&D of L buttock abscess (as well as debridement and biopsy of LLE wound) which did not result in worsening wounds at either site, presentation is not consistent with PG.   - Wound debrided and border of intact dermis biopsied (See Procedure Note for full details).   - Patient is not septic, and infection is improving on Vancomycin and with local wound care.     LLE Lateral Gaiter Region Wound- Fibrous histiocytoma, by prior skin biopsy (seen by Dermatology for this wound)  - No infection present    debrided excisional today- tolerted well  - Continue BID wound cleansing with 1/4 strength Dakins solution, and packing with small Kerlix moistened with 1/4 strength Dakins solution.   - Will f/u Pathology of tissue sent.   LLE Lateral Gaiter Region Wound  - Continue daily cleansing with NS and gauze  - Daily application of silicone border foam dressing

## 2023-05-10 NOTE — PROGRESS NOTE ADULT - ASSESSMENT
R Buttock Open and Infected Wound  - Appears atypical, inconsistent with usual presentation of an abscess. Irregular edge with some undermining is more consistent with an erosive process, such as pyoderma gangrenosum. However, given patient's lack of baseline pain and minimal pain with dressing changes, lack of violaceous coloration, and prior I&D of L buttock abscess (as well as debridement and biopsy of LLE wound) which did not result in worsening wounds at either site, presentation is not consistent with PG.   - Wound debrided and border of intact dermis biopsied (See Procedure Note for full details).   - Patient is not septic, and infection is improving on Vancomycin and with local wound care.     LLE Lateral Gaiter Region Wound  - Fibrous histiocytoma, by prior skin biopsy (seen by Dermatology for this wound)  - No infection present, wound appears to be progressing well.       PLAN:  R Buttock Open and Infected Wound  - Continue BID wound cleansing with 1/4 strength Dakins solution, and packing with small Kerlix moistened with 1/4 strength Dakins solution.   - Will f/u Pathology of tissue sent.   LLE Lateral Gaiter Region Wound  - Continue daily cleansing with NS and gauze  - Daily application of silicone border foam dressing    Continue low airloss support surface.  Continue to turn and position every 2 hours with z-roz fluidized positioning device.  Continue use of Complete Cair pressure offloading boots.  Continue Nutritional management as per RD recommendations.    Upon discharge follow up at outpatient [facility]. [address] [place] [address]. [phone].    Will continue to follow while inpatient.  Thank you.    Patient seen with Dr. Emily Marmolejo MD  Wound Care Fellow    If after 4PM or before 7:30AM on Mon-Friday or weekend/holiday please contact general surgery for urgent matters.   Team A- [id number]   Team B- [id number]    I/We spent 30 minutes face-to-face with this patient of which more than 50% of the time was spent counseling/coordinating care of this patient.       R Buttock Open and Infected Wound  - Appears atypical, inconsistent with usual presentation of an abscess. Irregular edge with some undermining is more consistent with an erosive process, such as pyoderma gangrenosum. However, given patient's lack of baseline pain and minimal pain with dressing changes, lack of violaceous coloration, and prior I&D of L buttock abscess (as well as debridement and biopsy of LLE wound) which did not result in worsening wounds at either site, presentation is not consistent with PG.   - Wound debrided and border of intact dermis biopsied (See Procedure Note for full details).   - Patient is not septic, and infection is improving on Vancomycin and with local wound care.     LLE Lateral Gaiter Region Wound  - Fibrous histiocytoma, by prior skin biopsy (seen by Dermatology for this wound)  - No infection present, wound appears to be progressing well.       PLAN:  R Buttock Open and Infected Wound  - Continue BID wound cleansing with 1/4 strength Dakins solution, and packing with small Kerlix moistened with 1/4 strength Dakins solution.   - Will f/u Pathology of tissue sent.   LLE Lateral Gaiter Region Wound  - Continue daily cleansing with NS and gauze  - Daily application of silicone border foam dressing    Continue low airloss support surface.  Continue to turn and position every 2 hours with z-roz fluidized positioning device.  Continue use of Complete Cair pressure offloading boots.  Continue Nutritional management as per RD recommendations.    Upon discharge follow up at outpatient [facility]. [address] [place] [address]. [phone].    Will continue to follow while inpatient.  Thank you.    Patient seen with Dr. Emily Marmolejo MD  Wound Care Fellow    If after 4PM or before 7:30AM on Mon-Friday or weekend/holiday please contact general surgery for urgent matters.   Team A- [id number]   Team B- [id number]

## 2023-05-10 NOTE — BH CONSULTATION LIAISON PROGRESS NOTE - NSBHATTESTCOMMENTATTENDFT_PSY_A_CORE
Chart reviewed. Pt seen with resident. Disorganized, though pleasant. Shared that he drinks "a few gallons of water a day" nonchalantly when c/o fluid restriction he is on. Denies current distress but appears to lack insight overall. Agree with above assessment/recs. Will continue to follow

## 2023-05-10 NOTE — DIETITIAN INITIAL EVALUATION ADULT - OTHER INFO
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 Surgery, ID, Dermatology, Wound Care, ID and Psychiatry on board.     Pt declines interview, comprehensive chart review completed. Pt lives at home alone. NKFA. No noted chewing/swallowing difficulties. History of type 2 DM, HbA1c (5/8) 6.3%. Pt continues with good PO intake in house, tolerating diet. No noted GI distress, no bowel regimen ordered at this time. Fingersticks WDL. Labs notable for hyponatremia, chronic per chart. Fluid restriction changed from 1.5L to 1L today per team.

## 2023-05-10 NOTE — PROGRESS NOTE ADULT - PROBLEM SELECTOR PLAN 3
On admission Diltiazem reduced to 180 daily, however he had elevated BPs in the past 24 hrs     - C/W Home Losartan 75mg PO QD  -c/w  Home Diltiazem ER 300mg PO QD -C/W Home Losartan 100mg PO QD  -c/w  Home Diltiazem ER 300mg PO QD

## 2023-05-10 NOTE — PROGRESS NOTE ADULT - ASSESSMENT
57-year-old male with past medical history bipolar, schizophrenia, hypogammaglobulinemia (monthly IVIG), HTN, DM 2, common variable immunodeficiency presenting with 2 weeks of pain/redness/drainage from right sided gluteal wound. He reports was recently at [facility] but left AMA, has been taking clindamycin without much improvement. Surgery consulted to evaluate, previously involved with debridement of left gluteal wound.    Patient is non toxic appearing and does not require urgent debridement.    Plan:  - Wound care team to debride wound today  - No acute care surgery interventions at this time  - Please recall with any questions or concerns      B Team Surgery  [id number]

## 2023-05-10 NOTE — PROGRESS NOTE ADULT - ATTENDING COMMENTS
57M with PMH of schizoaffective d/o, CVID, HTN, DM2, hx of MRSA bacteremia likely in setting of LLE cellulitis who presents to the hospital w/ R gluteal drainage concerning for cellulitis abscess. Currently on empiric abx. Surgery on board. ID/derm/allergy, BH, immunology on board as well. For further management. Infectious workup now revealing MRSA bacteremia, likely related to his buttock abscess. Clinically stable on IV abx. Pending further wound care/improvement.    Pt seen at bedside. No pain. Feels well. No complaints. On exam, wound unchanged. Pt calm, appropriate. Labs notable for Na 126.     #R gluteal abscess/cellulitis c/b MRSA bacteremia  #Chronic hyponatremia  #Schizoaffective d/o  #CVID  #HTN  #DM2    -Continue IV Vanco - dose by AUC - appreciate pharm assistance.  -Inc fluid restriction to 1L. Can consider salt tabs.  -Monitor BP for now. Consider med adjustment pending course.  -DM controlled at this point.  -CANNOT AMA - Dispo planning w/ BH  as he may need IP Psych.    Rest of plan as above.

## 2023-05-10 NOTE — PROCEDURE NOTE - ADDITIONAL PROCEDURE DETAILS
In addition to liquefactive and coagulative necrotic tissue of the infected wound bed, a small portion of intact, full-thickness skin was excised at the 9 o'clock position. All excised tissues were sent to Pathology in formalin. In addition to liquefactive and coagulative necrotic tissue of the infected wound bed, a small portion of intact, full-thickness skin was excised at the 9 o'clock position. All excised tissues were sent to Pathology in formalin.    Post-procedure, patient's HR 74 and /72. - In addition to liquefactive and coagulative necrotic tissue of the infected wound bed, a small portion of intact, full-thickness skin was excised at the 9 o'clock position. All excised tissues were sent to Pathology in formalin.    - Hemostasis achieved with combination of silver nitrate and Surgicell.     - Post-procedure, patient's HR 74 and /72. - In addition to liquefactive and coagulative necrotic tissue of the infected wound bed, a small portion of intact, full-thickness skin was excised at the 9 o'clock position. All excised tissues were sent to Pathology in formalin.    - Hemostasis achieved with combination of silver nitrate and Surgicell.     - Post-procedure, patient's HR 74 and /72.  post debridement 6.1x8.5x2.3

## 2023-05-10 NOTE — PROGRESS NOTE ADULT - PROBLEM SELECTOR PLAN 1
#Cellulitis c/b Sepsis  Pt with significant right gluteal wound progressive x1 weeks. P/w tachycardia, leukocytsosis c/w sepsis. Has h/o MRSA infection, requiring long course of vancomycin in the past. Historical Derm Bx negative for PG, but if persistent with poor wound healing, may reconsider further evaluation.  hx of left   left gluteal wound requiring debridement  surgery recommends multidisciplinary consults to determine etiology prior to debridement   TTE: ef: 60% unremarkable, endocardium could not be adequately visualized  5/6 BCX: MRSA; 5/8 BCx: NGTD   s/p cefepime   UCx negative   Surgery holding off on debridement   - Derm recs appreciated: not likely Pyoderma Gangrenosum  - c/w vancomycin (5/7-  ) 1250 q 12 hours with  AUC between 400-600  - wound care planning for debridement tomorrow   -repeat Blood culture until clear  -f/u ID recs #Cellulitis c/b Sepsis  Pt with significant right gluteal wound progressive x1 weeks. P/w tachycardia, leukocytsosis c/w sepsis. Has h/o MRSA infection, requiring long course of vancomycin in the past. Historical Derm Bx negative for PG, but if persistent with poor wound healing, may reconsider further evaluation.  hx of left   left gluteal wound requiring debridement  surgery recommends multidisciplinary consults to determine etiology prior to debridement   TTE: ef: 60% unremarkable, endocardium could not be adequately visualized  5/6 BCX: MRSA; 5/8 BCx: NGTD   s/p cefepime   UCx negative   Surgery holding off on debridement   - Derm recs appreciated: not likely Pyoderma Gangrenosum  - c/w vancomycin (5/7-  ) 1250 q 12 hours with  AUC between 400-600  - wound care planning for debridement today  -repeat Blood culture until clear  -f/u ID recs

## 2023-05-10 NOTE — PROCEDURE NOTE - NSASSISTBY_GEN_A_CORE
Topical Clindamycin Pregnancy And Lactation Text: This medication is Pregnancy Category B and is considered safe during pregnancy. It is unknown if it is excreted in breast milk. Use Enhanced Medication Counseling?: No Tetracycline Counseling: Patient counseled regarding possible photosensitivity and increased risk for sunburn.  Patient instructed to avoid sunlight, if possible.  When exposed to sunlight, patients should wear protective clothing, sunglasses, and sunscreen.  The patient was instructed to call the office immediately if the following severe adverse effects occur:  hearing changes, easy bruising/bleeding, severe headache, or vision changes.  The patient verbalized understanding of the proper use and possible adverse effects of tetracycline.  All of the patient's questions and concerns were addressed. Patient understands to avoid pregnancy while on therapy due to potential birth defects. Topical Retinoid counseling:  Patient advised to apply a pea-sized amount only at bedtime and wait 30 minutes after washing their face before applying.  If too drying, patient may add a non-comedogenic moisturizer. The patient verbalized understanding of the proper use and possible adverse effects of retinoids.  All of the patient's questions and concerns were addressed. Isotretinoin Counseling: Patient should get monthly blood tests, not donate blood, not drive at night if vision affected, not share medication, and not undergo elective surgery for 6 months after tx completed. Side effects reviewed, pt to contact office should one occur. Spironolactone Pregnancy And Lactation Text: This medication can cause feminization of the male fetus and should be avoided during pregnancy. The active metabolite is also found in breast milk. Doxycycline Counseling:  Patient counseled regarding possible photosensitivity and increased risk for sunburn.  Patient instructed to avoid sunlight, if possible.  When exposed to sunlight, patients should wear protective clothing, sunglasses, and sunscreen.  The patient was instructed to call the office immediately if the following severe adverse effects occur:  hearing changes, easy bruising/bleeding, severe headache, or vision changes.  The patient verbalized understanding of the proper use and possible adverse effects of doxycycline.  All of the patient's questions and concerns were addressed. Minocycline Pregnancy And Lactation Text: This medication is Pregnancy Category D and not consider safe during pregnancy. It is also excreted in breast milk. Bactrim Pregnancy And Lactation Text: This medication is Pregnancy Category D and is known to cause fetal risk.  It is also excreted in breast milk. Topical Sulfur Applications Counseling: Topical Sulfur Counseling: Patient counseled that this medication may cause skin irritation or allergic reactions.  In the event of skin irritation, the patient was advised to reduce the amount of the drug applied or use it less frequently.   The patient verbalized understanding of the proper use and possible adverse effects of topical sulfur application.  All of the patient's questions and concerns were addressed. Detail Level: Detailed High Dose Vitamin A Counseling: Side effects reviewed, pt to contact office should one occur. Birth Control Pills Counseling: Birth Control Pill Counseling: I discussed with the patient the potential side effects of OCPs including but not limited to increased risk of stroke, heart attack, thrombophlebitis, deep venous thrombosis, hepatic adenomas, breast changes, GI upset, headaches, and depression.  The patient verbalized understanding of the proper use and possible adverse effects of OCPs. All of the patient's questions and concerns were addressed. Sarecycline Counseling: Patient advised regarding possible photosensitivity and discoloration of the teeth, skin, lips, tongue and gums.  Patient instructed to avoid sunlight, if possible.  When exposed to sunlight, patients should wear protective clothing, sunglasses, and sunscreen.  The patient was instructed to call the office immediately if the following severe adverse effects occur:  hearing changes, easy bruising/bleeding, severe headache, or vision changes.  The patient verbalized understanding of the proper use and possible adverse effects of sarecycline.  All of the patient's questions and concerns were addressed. Isotretinoin Pregnancy And Lactation Text: This medication is Pregnancy Category X and is considered extremely dangerous during pregnancy. It is unknown if it is excreted in breast milk. Topical Retinoid Pregnancy And Lactation Text: This medication is Pregnancy Category C. It is unknown if this medication is excreted in breast milk. Azithromycin Counseling:  I discussed with the patient the risks of azithromycin including but not limited to GI upset, allergic reaction, drug rash, diarrhea, and yeast infections. Doxycycline Pregnancy And Lactation Text: This medication is Pregnancy Category D and not consider safe during pregnancy. It is also excreted in breast milk but is considered safe for shorter treatment courses. High Dose Vitamin A Pregnancy And Lactation Text: High dose vitamin A therapy is contraindicated during pregnancy and breast feeding. Benzoyl Peroxide Counseling: Patient counseled that medicine may cause skin irritation and bleach clothing.  In the event of skin irritation, the patient was advised to reduce the amount of the drug applied or use it less frequently.   The patient verbalized understanding of the proper use and possible adverse effects of benzoyl peroxide.  All of the patient's questions and concerns were addressed. Madeline Thomas/BRITNAY Patient Specific Counseling (Will Not Stick From Patient To Patient): Pt denies family h/o IBD and personal h/o anxiety/depression or peanut/soy allergy. Dapsone Counseling: I discussed with the patient the risks of dapsone including but not limited to hemolytic anemia, agranulocytosis, rashes, methemoglobinemia, kidney failure, peripheral neuropathy, headaches, GI upset, and liver toxicity.  Patients who start dapsone require monitoring including baseline LFTs and weekly CBCs for the first month, then every month thereafter.  The patient verbalized understanding of the proper use and possible adverse effects of dapsone.  All of the patient's questions and concerns were addressed. Erythromycin Counseling:  I discussed with the patient the risks of erythromycin including but not limited to GI upset, allergic reaction, drug rash, diarrhea, increase in liver enzymes, and yeast infections. Tazorac Counseling:  Patient advised that medication is irritating and drying.  Patient may need to apply sparingly and wash off after an hour before eventually leaving it on overnight.  The patient verbalized understanding of the proper use and possible adverse effects of tazorac.  All of the patient's questions and concerns were addressed. Birth Control Pills Pregnancy And Lactation Text: This medication should be avoided if pregnant and for the first 30 days post-partum. Azithromycin Pregnancy And Lactation Text: This medication is considered safe during pregnancy and is also secreted in breast milk. Topical Sulfur Applications Pregnancy And Lactation Text: This medication is Pregnancy Category C and has an unknown safety profile during pregnancy. It is unknown if this topical medication is excreted in breast milk. Dapsone Pregnancy And Lactation Text: This medication is Pregnancy Category C and is not considered safe during pregnancy or breast feeding. Minocycline Counseling: Patient advised regarding possible photosensitivity and discoloration of the teeth, skin, lips, tongue and gums.  Patient instructed to avoid sunlight, if possible.  When exposed to sunlight, patients should wear protective clothing, sunglasses, and sunscreen.  The patient was instructed to call the office immediately if the following severe adverse effects occur:  hearing changes, easy bruising/bleeding, severe headache, or vision changes.  The patient verbalized understanding of the proper use and possible adverse effects of minocycline.  All of the patient's questions and concerns were addressed. Topical Clindamycin Counseling: Patient counseled that this medication may cause skin irritation or allergic reactions.  In the event of skin irritation, the patient was advised to reduce the amount of the drug applied or use it less frequently.   The patient verbalized understanding of the proper use and possible adverse effects of clindamycin.  All of the patient's questions and concerns were addressed. Bactrim Counseling:  I discussed with the patient the risks of sulfa antibiotics including but not limited to GI upset, allergic reaction, drug rash, diarrhea, dizziness, photosensitivity, and yeast infections.  Rarely, more serious reactions can occur including but not limited to aplastic anemia, agranulocytosis, methemoglobinemia, blood dyscrasias, liver or kidney failure, lung infiltrates or desquamative/blistering drug rashes. Spironolactone Counseling: Patient advised regarding risks of diarrhea, abdominal pain, hyperkalemia, birth defects (for female patients), liver toxicity and renal toxicity. The patient may need blood work to monitor liver and kidney function and potassium levels while on therapy. The patient verbalized understanding of the proper use and possible adverse effects of spironolactone.  All of the patient's questions and concerns were addressed. Tazorac Pregnancy And Lactation Text: This medication is not safe during pregnancy. It is unknown if this medication is excreted in breast milk. Erythromycin Pregnancy And Lactation Text: This medication is Pregnancy Category B and is considered safe during pregnancy. It is also excreted in breast milk. Benzoyl Peroxide Pregnancy And Lactation Text: This medication is Pregnancy Category C. It is unknown if benzoyl peroxide is excreted in breast milk. Winlevi Pregnancy And Lactation Text: This medication is considered safe during pregnancy and breastfeeding. Winlevi Counseling:  I discussed with the patient the risks of topical clascoterone including but not limited to erythema, scaling, itching, and stinging. Patient voiced their understanding. Azelaic Acid Counseling: Patient counseled that medicine may cause skin irritation and to avoid applying near the eyes.  In the event of skin irritation, the patient was advised to reduce the amount of the drug applied or use it less frequently.   The patient verbalized understanding of the proper use and possible adverse effects of azelaic acid.  All of the patient's questions and concerns were addressed. Azelaic Acid Pregnancy And Lactation Text: This medication is considered safe during pregnancy and breast feeding.

## 2023-05-10 NOTE — PROGRESS NOTE ADULT - PROBLEM SELECTOR PLAN 5
Pt with h/o variable hyponatremia. SOsm calc c/w hypotonic hyponatremia. Farrukh low suggestive of sodium-avid state (i.e. RAAS activation), UOsm quite low, though c/f psychogenic polydipsia vs poor solute intake.. Unclear contribution of ARB as OP as pt not filling medications consistently.   likely 2/2 SIADH as improved with fluid restriction   - Trend Na   -c/w fluid restriction Pt with h/o variable hyponatremia. SOsm calc c/w hypotonic hyponatremia. Farrukh low suggestive of sodium-avid state (i.e. RAAS activation), UOsm quite low, though c/f psychogenic polydipsia vs poor solute intake.. Unclear contribution of ARB as OP as pt not filling medications consistently.   likely 2/2 SIADH as improved with fluid restriction   - Trend Na   - increased fluid restriction to 1L

## 2023-05-11 ENCOUNTER — APPOINTMENT (OUTPATIENT)
Dept: RHEUMATOLOGY | Facility: CLINIC | Age: 58
End: 2023-05-11

## 2023-05-11 LAB
-  AMPICILLIN/SULBACTAM: SIGNIFICANT CHANGE UP
-  CEFAZOLIN: SIGNIFICANT CHANGE UP
-  CLINDAMYCIN: SIGNIFICANT CHANGE UP
-  DAPTOMYCIN: SIGNIFICANT CHANGE UP
-  ERYTHROMYCIN: SIGNIFICANT CHANGE UP
-  GENTAMICIN: SIGNIFICANT CHANGE UP
-  LINEZOLID: SIGNIFICANT CHANGE UP
-  OXACILLIN: SIGNIFICANT CHANGE UP
-  PENICILLIN: SIGNIFICANT CHANGE UP
-  RIFAMPIN: SIGNIFICANT CHANGE UP
-  TETRACYCLINE: SIGNIFICANT CHANGE UP
-  TRIMETHOPRIM/SULFAMETHOXAZOLE: SIGNIFICANT CHANGE UP
-  VANCOMYCIN: SIGNIFICANT CHANGE UP
ALBUMIN SERPL ELPH-MCNC: 3.6 G/DL — SIGNIFICANT CHANGE UP (ref 3.3–5)
ALP SERPL-CCNC: 165 U/L — HIGH (ref 40–120)
ALT FLD-CCNC: 63 U/L — HIGH (ref 4–41)
ANION GAP SERPL CALC-SCNC: 13 MMOL/L — SIGNIFICANT CHANGE UP (ref 7–14)
AST SERPL-CCNC: 30 U/L — SIGNIFICANT CHANGE UP (ref 4–40)
BILIRUB SERPL-MCNC: 0.4 MG/DL — SIGNIFICANT CHANGE UP (ref 0.2–1.2)
BUN SERPL-MCNC: 10 MG/DL — SIGNIFICANT CHANGE UP (ref 7–23)
CALCIUM SERPL-MCNC: 9.6 MG/DL — SIGNIFICANT CHANGE UP (ref 8.4–10.5)
CHLORIDE SERPL-SCNC: 92 MMOL/L — LOW (ref 98–107)
CO2 SERPL-SCNC: 24 MMOL/L — SIGNIFICANT CHANGE UP (ref 22–31)
CREAT SERPL-MCNC: 0.7 MG/DL — SIGNIFICANT CHANGE UP (ref 0.5–1.3)
CULTURE RESULTS: SIGNIFICANT CHANGE UP
EGFR: 109 ML/MIN/1.73M2 — SIGNIFICANT CHANGE UP
GLUCOSE BLDC GLUCOMTR-MCNC: 129 MG/DL — HIGH (ref 70–99)
GLUCOSE SERPL-MCNC: 106 MG/DL — HIGH (ref 70–99)
MAGNESIUM SERPL-MCNC: 2.2 MG/DL — SIGNIFICANT CHANGE UP (ref 1.6–2.6)
METHOD TYPE: SIGNIFICANT CHANGE UP
PHOSPHATE SERPL-MCNC: 4.1 MG/DL — SIGNIFICANT CHANGE UP (ref 2.5–4.5)
POTASSIUM SERPL-MCNC: 4.9 MMOL/L — SIGNIFICANT CHANGE UP (ref 3.5–5.3)
POTASSIUM SERPL-SCNC: 4.9 MMOL/L — SIGNIFICANT CHANGE UP (ref 3.5–5.3)
PROT SERPL-MCNC: 7.3 G/DL — SIGNIFICANT CHANGE UP (ref 6–8.3)
SODIUM SERPL-SCNC: 129 MMOL/L — LOW (ref 135–145)
[facility] SOURCE: SIGNIFICANT CHANGE UP

## 2023-05-11 PROCEDURE — 99232 SBSQ HOSP IP/OBS MODERATE 35: CPT

## 2023-05-11 PROCEDURE — 99232 SBSQ HOSP IP/OBS MODERATE 35: CPT | Mod: GC

## 2023-05-11 RX ORDER — SIMETHICONE 80 MG/1
80 TABLET, CHEWABLE ORAL ONCE
Refills: 0 | Status: COMPLETED | OUTPATIENT
Start: 2023-05-11 | End: 2023-05-11

## 2023-05-11 RX ORDER — ARIPIPRAZOLE 15 MG/1
10 TABLET ORAL DAILY
Refills: 0 | Status: DISCONTINUED | OUTPATIENT
Start: 2023-05-12 | End: 2023-05-16

## 2023-05-11 RX ORDER — VANCOMYCIN HCL 1 G
1500 VIAL (EA) INTRAVENOUS EVERY 12 HOURS
Refills: 0 | Status: DISCONTINUED | OUTPATIENT
Start: 2023-05-11 | End: 2023-05-13

## 2023-05-11 RX ADMIN — OXYCODONE HYDROCHLORIDE 5 MILLIGRAM(S): 5 TABLET ORAL at 20:16

## 2023-05-11 RX ADMIN — Medication 300 MILLIGRAM(S): at 17:41

## 2023-05-11 RX ADMIN — Medication 1 APPLICATION(S): at 06:11

## 2023-05-11 RX ADMIN — Medication 2000 UNIT(S): at 13:30

## 2023-05-11 RX ADMIN — OXYCODONE HYDROCHLORIDE 5 MILLIGRAM(S): 5 TABLET ORAL at 21:00

## 2023-05-11 RX ADMIN — SIMETHICONE 80 MILLIGRAM(S): 80 TABLET, CHEWABLE ORAL at 22:33

## 2023-05-11 RX ADMIN — OLANZAPINE 5 MILLIGRAM(S): 15 TABLET, FILM COATED ORAL at 21:53

## 2023-05-11 RX ADMIN — Medication 300 MILLIGRAM(S): at 06:11

## 2023-05-11 RX ADMIN — Medication 300 MILLIGRAM(S): at 07:04

## 2023-05-11 RX ADMIN — Medication 975 MILLIGRAM(S): at 22:30

## 2023-05-11 RX ADMIN — Medication 975 MILLIGRAM(S): at 21:53

## 2023-05-11 RX ADMIN — BUDESONIDE AND FORMOTEROL FUMARATE DIHYDRATE 2 PUFF(S): 160; 4.5 AEROSOL RESPIRATORY (INHALATION) at 09:55

## 2023-05-11 RX ADMIN — LOSARTAN POTASSIUM 100 MILLIGRAM(S): 100 TABLET, FILM COATED ORAL at 06:11

## 2023-05-11 RX ADMIN — Medication 1 APPLICATION(S): at 17:43

## 2023-05-11 RX ADMIN — ATORVASTATIN CALCIUM 40 MILLIGRAM(S): 80 TABLET, FILM COATED ORAL at 21:53

## 2023-05-11 NOTE — PROGRESS NOTE ADULT - SUBJECTIVE AND OBJECTIVE BOX
Kinsey Camacho MD    Internal Medicine Resident (PGY-2)  ___________________________________________________________________________________________________      DAT WOOD 57y Male    Overnight events/subjective: S/p wound debridement by wound care yesterday. Vanc increased to 1500 q8h overnight.    Vital Signs Last 24 Hrs  T(C): 36.7 (11 May 2023 05:34), Max: 36.7 (11 May 2023 05:34)  T(F): 98.1 (11 May 2023 05:34), Max: 98.1 (11 May 2023 05:34)  HR: 81 (11 May 2023 05:34) (65 - 81)  BP: 144/79 (11 May 2023 05:34) (144/79 - 154/75)  BP(mean): --  RR: 17 (11 May 2023 05:34) (17 - 18)  SpO2: 93% (11 May 2023 05:34) (93% - 96%)    Parameters below as of 11 May 2023 05:34  Patient On (Oxygen Delivery Method): room air      PHYSICAL EXAM:  GENERAL: no acute distress  HEENT - atraumatic, normocephalic, EOMI  CV: RRR, no murmurs  PULM: CTAB  ABDOMEN: soft, nontender, nondistended  MSK: extremities atraumatic; no cyanosis or clubbing  SKIN: R gluteal wound with surrounding erythema  NEURO:  no focal deficits, sensory and motor grossly intact  PSYCH: alert and oriented x3    HOSPITAL MEDICATIONS:  MEDICATIONS  (STANDING):  ARIPiprazole 5 milliGRAM(s) Oral daily  atorvastatin 40 milliGRAM(s) Oral at bedtime  budesonide 160 MICROgram(s)/formoterol 4.5 MICROgram(s) Inhaler 2 Puff(s) Inhalation two times a day  cholecalciferol 2000 Unit(s) Oral daily  Dakins Solution - 1/4 Strength 1 Application(s) Topical two times a day  diltiazem    milliGRAM(s) Oral daily  losartan 100 milliGRAM(s) Oral daily  vancomycin  IVPB 1500 milliGRAM(s) IV Intermittent every 8 hours  vancomycin  IVPB        MEDICATIONS  (PRN):  acetaminophen     Tablet .. 975 milliGRAM(s) Oral every 6 hours PRN Temp greater or equal to 38C (100.4F), Mild Pain (1 - 3), Moderate Pain (4 - 6), Severe Pain (7 - 10)  albuterol    90 MICROgram(s) HFA Inhaler 2 Puff(s) Inhalation every 6 hours PRN Shortness of Breath and/or Wheezing  OLANZapine 5 milliGRAM(s) Oral every 6 hours PRN Agitation  oxyCODONE    IR 5 milliGRAM(s) Oral every 4 hours PRN Moderate Pain (4 - 6)      LABS:    05-11    129<L>  |  92<L>  |  10  ----------------------------<  106<H>  4.9   |  24  |  0.70    Ca    9.6      11 May 2023 06:08  Phos  4.1     05-11  Mg     2.20     05-11    TPro  7.3  /  Alb  3.6  /  TBili  0.4  /  DBili  x   /  AST  30  /  ALT  63<H>  /  AlkPhos  165<H>  05-11    PT/INR - ( 10 May 2023 07:36 )   PT: 13.7 sec;   INR: 1.18 ratio         PTT - ( 10 May 2023 07:36 )  PTT:31.3 sec       Kinsey Camacho MD    Internal Medicine Resident (PGY-2)  ___________________________________________________________________________________________________      DAT WOOD 57y Male    Overnight events/subjective: S/p wound debridement by wound care yesterday. Vanc increased to 1500 q8h overnight. Pt w/o complaints this AM.    Vital Signs Last 24 Hrs  T(C): 36.7 (11 May 2023 05:34), Max: 36.7 (11 May 2023 05:34)  T(F): 98.1 (11 May 2023 05:34), Max: 98.1 (11 May 2023 05:34)  HR: 81 (11 May 2023 05:34) (65 - 81)  BP: 144/79 (11 May 2023 05:34) (144/79 - 154/75)  BP(mean): --  RR: 17 (11 May 2023 05:34) (17 - 18)  SpO2: 93% (11 May 2023 05:34) (93% - 96%)    Parameters below as of 11 May 2023 05:34  Patient On (Oxygen Delivery Method): room air      PHYSICAL EXAM:  GENERAL: no acute distress  HEENT - atraumatic, normocephalic, EOMI  CV: RRR, no murmurs  PULM: CTAB  ABDOMEN: soft, nontender, nondistended  MSK: extremities atraumatic; no cyanosis or clubbing  SKIN: R gluteal wound with surrounding erythema  NEURO:  no focal deficits, sensory and motor grossly intact  PSYCH: alert and oriented x3    HOSPITAL MEDICATIONS:  MEDICATIONS  (STANDING):  ARIPiprazole 5 milliGRAM(s) Oral daily  atorvastatin 40 milliGRAM(s) Oral at bedtime  budesonide 160 MICROgram(s)/formoterol 4.5 MICROgram(s) Inhaler 2 Puff(s) Inhalation two times a day  cholecalciferol 2000 Unit(s) Oral daily  Dakins Solution - 1/4 Strength 1 Application(s) Topical two times a day  diltiazem    milliGRAM(s) Oral daily  losartan 100 milliGRAM(s) Oral daily  vancomycin  IVPB 1500 milliGRAM(s) IV Intermittent every 8 hours  vancomycin  IVPB        MEDICATIONS  (PRN):  acetaminophen     Tablet .. 975 milliGRAM(s) Oral every 6 hours PRN Temp greater or equal to 38C (100.4F), Mild Pain (1 - 3), Moderate Pain (4 - 6), Severe Pain (7 - 10)  albuterol    90 MICROgram(s) HFA Inhaler 2 Puff(s) Inhalation every 6 hours PRN Shortness of Breath and/or Wheezing  OLANZapine 5 milliGRAM(s) Oral every 6 hours PRN Agitation  oxyCODONE    IR 5 milliGRAM(s) Oral every 4 hours PRN Moderate Pain (4 - 6)      LABS:    05-11    129<L>  |  92<L>  |  10  ----------------------------<  106<H>  4.9   |  24  |  0.70    Ca    9.6      11 May 2023 06:08  Phos  4.1     05-11  Mg     2.20     05-11    TPro  7.3  /  Alb  3.6  /  TBili  0.4  /  DBili  x   /  AST  30  /  ALT  63<H>  /  AlkPhos  165<H>  05-11    PT/INR - ( 10 May 2023 07:36 )   PT: 13.7 sec;   INR: 1.18 ratio         PTT - ( 10 May 2023 07:36 )  PTT:31.3 sec

## 2023-05-11 NOTE — PROVIDER CONTACT NOTE (CRITICAL VALUE NOTIFICATION) - ASSESSMENT
Gram positive cocci in clusters in aerobic bottle drawn on 5/6/23.
Pt is otherwise stable at this time

## 2023-05-11 NOTE — PROGRESS NOTE ADULT - ATTENDING COMMENTS
57M with PMH of schizoaffective d/o, CVID, HTN, DM2, hx of MRSA bacteremia likely in setting of LLE cellulitis who presents to the hospital w/ R gluteal drainage concerning for cellulitis abscess. Currently on empiric abx. Surgery on board. ID/derm/allergy, BH, immunology on board as well. For further management. Infectious workup now revealing MRSA bacteremia, likely related to his buttock abscess. Clinically stable on IV abx. Pending further wound care/improvement.    Pt seen at bedside. No pain. Feels well. No complaints. Understanding of his extended stay as he remains not psychiatrically cleared. Exam show lessening drainage from gluteal wound. Labs notable for Na 129.     #R gluteal abscess/cellulitis c/b MRSA bacteremia  #Chronic hyponatremia  #Schizoaffective d/o  #CVID  #HTN  #DM2    -Continue IV Vanco - dose by AUC - appreciate pharm assistance. Appreciate ID recs.  -Monitor Na - consider salt tabs.  -DM controlled at this point.  -CANNOT AMA - Dispo planning w/ BH  -     Rest of plan as above.

## 2023-05-11 NOTE — PHYSICAL THERAPY INITIAL EVALUATION ADULT - ADDITIONAL COMMENTS
Pt lives in an apartment alone, was independent with all functional mobility and ADL performance without use of an assistive device prior to admission.     Pt left semi-supine in bed, all lines intact, all needs in reach, bed alarm set, in NAD. GARCIA brumfield.

## 2023-05-11 NOTE — PROVIDER CONTACT NOTE (CRITICAL VALUE NOTIFICATION) - ACTION/TREATMENT ORDERED:
MD made aware. Pt is on cefepime and vancomycin.
MD said she is aware and to continue to monitor pt at this time

## 2023-05-11 NOTE — BH CONSULTATION LIAISON PROGRESS NOTE - NSBHATTESTCOMMENTATTENDFT_PSY_A_CORE
Chart reviewed. Pt seen, was calm in afternoon. Reports he wants to go to an "extended-care facility" but also did not balk when I said he needed his psychiatric care managed before returning to the community. Agree with above assessment/recs. Will continue to follow.

## 2023-05-11 NOTE — PROGRESS NOTE ADULT - SUBJECTIVE AND OBJECTIVE BOX
Follow Up:  MRSA bacteremia and abscess    Interval History/ROS: no fever, cough, abd pain, s/p debridement by wound care and abscess cx with MRSA as well          Allergies  amoxicillin (Fever)  penicillin (Rash)        ANTIMICROBIALS:  vancomycin  IVPB 1500 every 12 hours      OTHER MEDS:  acetaminophen     Tablet .. 975 milliGRAM(s) Oral every 6 hours PRN  albuterol    90 MICROgram(s) HFA Inhaler 2 Puff(s) Inhalation every 6 hours PRN  atorvastatin 40 milliGRAM(s) Oral at bedtime  budesonide 160 MICROgram(s)/formoterol 4.5 MICROgram(s) Inhaler 2 Puff(s) Inhalation two times a day  cholecalciferol 2000 Unit(s) Oral daily  Dakins Solution - 1/4 Strength 1 Application(s) Topical two times a day  diltiazem    milliGRAM(s) Oral daily  losartan 100 milliGRAM(s) Oral daily  OLANZapine 5 milliGRAM(s) Oral every 6 hours PRN  oxyCODONE    IR 5 milliGRAM(s) Oral every 4 hours PRN      Vital Signs Last 24 Hrs  T(C): 36.7 (11 May 2023 05:34), Max: 36.7 (11 May 2023 05:34)  T(F): 98.1 (11 May 2023 05:34), Max: 98.1 (11 May 2023 05:34)  HR: 81 (11 May 2023 05:34) (65 - 81)  BP: 144/79 (11 May 2023 05:34) (144/79 - 154/75)  BP(mean): --  RR: 17 (11 May 2023 05:34) (17 - 18)  SpO2: 96% (11 May 2023 14:21) (93% - 96%)    Parameters below as of 11 May 2023 14:21  Patient On (Oxygen Delivery Method): room air        Physical Exam:  General:    NAD,  non toxic  Respiratory:    comfortable on RA  abd:     soft,   BS +,   no tenderness  :   no CVAT,  no suprapubic tenderness,   no  arceo  Musculoskeletal:   no joint swelling  vascular: no phlebitis  Skin:   +R buttock wound s/p I&D with dressing        05-11    129<L>  |  92<L>  |  10  ----------------------------<  106<H>  4.9   |  24  |  0.70    Ca    9.6      11 May 2023 06:08  Phos  4.1     05-11  Mg     2.20     05-11    TPro  7.3  /  Alb  3.6  /  TBili  0.4  /  DBili  x   /  AST  30  /  ALT  63<H>  /  AlkPhos  165<H>  05-11          MICROBIOLOGY:  Vancomycin Level, Trough: 5.5 ug/mL (05-10-23 @ 21:13)  v  .Blood Blood-Peripheral  05-10-23   No growth to date.  --  --      .Abscess right buttock  05-09-23   Moderate Methicillin Resistant Staphylococcus aureus  --  Methicillin resistant Staphylococcus aureus      .Blood Blood-Peripheral  05-08-23   No growth to date.  --  --      .Blood Blood-Peripheral  05-08-23   No growth to date.  --  --      Clean Catch Clean Catch (Midstream)  05-06-23   No growth  --  --      .Blood Blood-Peripheral  05-06-23   Growth in aerobic bottle: Methicillin Resistant Staphylococcus aureus  ***Blood Panel PCR results on this specimen are available  approximately 3 hours after the Gram stain result.***  Gram stain, PCR, and/or culture results may not always  correspond due to difference in methodologies.  ************************************************************  This PCR assay was performed by multiplex PCR. This  Assay tests for 66 bacterial and resistance gene targets.  Please refer to the [facility] test directory  at [email or web address] for details.  --  Blood Culture PCR  Methicillin resistant Staphylococcus aureus      .Blood Blood-Peripheral  05-06-23   No growth to date.  --  --                RADIOLOGY:  Images independently visualized and reviewed personally, findings as below  < from: CT Pelvis w/ IV Cont (05.06.23 @ 21:22) >  IMPRESSION:  Moderate subcutaneous inflammatory change and skin thickening in the   right buttocks suggestive of a cellulitis. No associated rim-enhancing   fluid collection to suggest an abscess. No subcutaneous gas.      < end of copied text >

## 2023-05-11 NOTE — PROGRESS NOTE ADULT - ASSESSMENT
57 m with bipolar, schizophrenia, HTN,  bronchiectasis, CVID, hypogammaglobulinemia (monthly IVIG), previous LLE cellulitis/abscess and MRSA bacteremia, went to [facility] for R buttock wound, cellulitis but signed out AMA and was given clinda now p/w worsening pain/edema  here afebrile, WBC: 15  abd/pelvis CT: buttock cellulitis, no abscess seen  blood cx: MRSA, repeat negative 5/8  s/p wound debridement by wound care 5/10, had Necrotic dermis and subcutaneous fatty tissue Into subcutaneous tissues, cx also with MRSA  TTE unable to exclude endocarditis    leukocytosis, MRSA bacteremia due to buttock cellulitis and abscess in the setting of CVID, on monthly IVIG and previous MRSA bacteremia with abscess  repeat blood cx negative 5/8, TTE unable to exclude endocarditis   s/p I&D of necrotic skin and fatty tissue 5/10, cx with MRSA aswell    * vanco level low but based on AUC calculator we can lower the dose  * change vanco to 1500 q 12 (based on AUC calculation) and check the trough before the 4th dose  * f/u with wound care  * will need a 4 week course of vanco post negative blood cx through 6/5  * weekly CBC, CMP, vanco trough while on antibiotics       The above assessment and plan was discussed with the primary team    Genesis Ramirez MD  contact on teams  After 5pm and on weekends call [phone]

## 2023-05-11 NOTE — PROGRESS NOTE ADULT - PROBLEM SELECTOR PLAN 5
Pt with h/o variable hyponatremia. SOsm calc c/w hypotonic hyponatremia. Farrukh low suggestive of sodium-avid state (i.e. RAAS activation), UOsm quite low, though c/f psychogenic polydipsia vs poor solute intake.. Unclear contribution of ARB as OP as pt not filling medications consistently.   likely 2/2 SIADH as improved with fluid restriction   - Trend Na   - increased fluid restriction to 1L Pt with h/o variable hyponatremia. SOsm calc c/w hypotonic hyponatremia. Farrukh low suggestive of sodium-avid state (i.e. RAAS activation), UOsm quite low, though c/f psychogenic polydipsia vs poor solute intake.. Unclear contribution of ARB as OP as pt not filling medications consistently.   likely 2/2 SIADH as improved with fluid restriction   - Trend Na   - c/w fluid restriction to 1L

## 2023-05-11 NOTE — PHYSICAL THERAPY INITIAL EVALUATION ADULT - PERTINENT HX OF CURRENT PROBLEM, REHAB EVAL
Patient is a 57 year old Male with history stated below, who presents to the ER with right gluteal wound and erythema with pus as well.

## 2023-05-11 NOTE — PROGRESS NOTE ADULT - PROBLEM SELECTOR PLAN 1
#Cellulitis c/b Sepsis  Pt with significant right gluteal wound progressive x1 weeks. P/w tachycardia, leukocytsosis c/w sepsis. Has h/o MRSA infection, requiring long course of vancomycin in the past. Historical Derm Bx negative for PG, but if persistent with poor wound healing, may reconsider further evaluation.  hx of left   left gluteal wound requiring debridement  surgery recommends multidisciplinary consults to determine etiology prior to debridement   TTE: ef: 60% unremarkable, endocardium could not be adequately visualized  5/6 BCX: MRSA; 5/8 BCx: NGTD   s/p cefepime   UCx negative   Surgery holding off on debridement  - s/p wound care debridement 5/10, f/u path results  - Derm recs appreciated: not likely Pyoderma Gangrenosum  - c/w vancomycin (5/7-  ) 1500 q8 hours with  AUC between 400-600  - repeat Blood culture until clear  - f/u ID recs #Cellulitis c/b Sepsis  Pt with significant right gluteal wound progressive x1 weeks. P/w tachycardia, leukocytsosis c/w sepsis. Has h/o MRSA infection, requiring long course of vancomycin in the past. Historical Derm Bx negative for PG, but if persistent with poor wound healing, may reconsider further evaluation.  hx of left   left gluteal wound requiring debridement  surgery recommends multidisciplinary consults to determine etiology prior to debridement   TTE: ef: 60% unremarkable, endocardium could not be adequately visualized  5/6 BCX: MRSA; 5/8 BCx: NGTD   s/p cefepime   UCx negative   Surgery holding off on debridement  - s/p wound care debridement 5/10, f/u path results, will f/u if any interventions needed inpatient  - Derm recs appreciated: not likely Pyoderma Gangrenosum  - c/w vancomycin (5/7-  ) 1500 q8 hours with  AUC between 400-600  - repeat Blood culture until clear  - f/u ID recs

## 2023-05-11 NOTE — PROVIDER CONTACT NOTE (CRITICAL VALUE NOTIFICATION) - SITUATION
Blood culture: Gram positive cocci in clusters in aerobic bottle drawn on 5/6/23.
The abscess culture was positive with moderate MRSA

## 2023-05-11 NOTE — PROGRESS NOTE ADULT - PROBLEM SELECTOR PLAN 4
PT with h/o CVID requiring IgG infusions q1mo (last 4/13/2023).  OP Immunologist: Dr. Mitchell Boxer ([facility])  IGg level low at 489   s/p gammagard 75 grams on 5/8. D/w A&I fellow Dr. Kimura, no need to re-dose with gammagard S/D at this time. Can resume usual monthly infusions after discharge.    - Allergy and Immunology recs appreciated   -recorded allergy to amoxicillin and penicillin however tolerated augmentin in the past, may re-address allergy if he needs a beta lactem PT with h/o CVID requiring IgG infusions q1mo (last 4/13/2023).  OP Immunologist: Dr. Mitchell Boxer ([facility])  IGg level low at 489   s/p gammagard 75 grams on 5/8. D/w A&I fellow Dr. Kimura on 5/10, no need to re-dose with gammagard S/D at this time. Can resume usual monthly infusions after discharge.    - Allergy and Immunology recs appreciated   -recorded allergy to amoxicillin and penicillin however tolerated augmentin in the past, may re-address allergy if he needs a beta lactem

## 2023-05-11 NOTE — PROGRESS NOTE ADULT - PROBLEM SELECTOR PLAN 2
Historically diagnosed. Pt confirms schizophrenia but denies being on any active psychotropic medications. Denies AH/VH. Denies SI/HI. Does not specify why he left AMA from other hospital, but does understand that he needs help here. historically lacked capacity to leave AMA, but not trying at this time.  - No need for 1:1 at this time  - Psychiatry/ Consult recs appreciated  - c/w aripiprazole 5mg qd for psychosis/mood  - C/W zyrprexa 5 PO for agitation  - Per , pt cannot leave AMA at this time. Will be evaluated for inpatient psych after medically cleared

## 2023-05-11 NOTE — BH CONSULTATION LIAISON PROGRESS NOTE - NSBHASSESSMENTFT_PSY_ALL_CORE
56M, domiciled in supportive housing TSI, SSD, single, PMH HTN, DM, hypogammaglobulinemia, PPHx schizoaffective d/o, bipolar d/o, hx of multiple psych hospitalizations (last known in 2020 at [facility]), denies hx SA/NSSIB, denies drug or alcohol use, denies legal hx, who presented with right gluteal wound, admitted to medicine for cellulutis. Psychiatry consulted for psychosis/schizophrenia.    On assessment, patient is calm and cooperative. AAO3. No overt symptoms of psychosis, depression, or yo. No S/I or H/I. Patient w/ recurrent/chronic infections. Patient has left multiple hospitals AMA while undergoing treatment for cellulitis. However, patient recognizes benefit of continuing w/ antibiotic treatment, as well as risks of discontinuing treatment. Patient not currently refusing treatment or attempting to leave AMA. Patient interested in reconnection to outpatient care after discharge. Patient was due for Haldol decanoate 150mg on 4/16. Will obtain collateral see when last received.     5/9: no interval change. no overt positive symptoms of psychosis. patient w/ likely negative/cognitive symptoms that impair ability to adequately care for self (e.g. likely contribute to poor hygiene, recurrent infections, etc.). no si or hi. interested in resuming outpatient care and restarting psychotropics. recommend aripiprazole 5mg qd for psychosis/mood (w/ reported history of depression).     5/10: status quo. no psychosis or depression. patient w/ impaired ability to care for self, likely a result of negative/cognitive symptoms. per chart review, patient has not seen outpt psychiatrist since 9/22. cannot leave AMA. will consider inpatient psych once medically cleared.     5/11: feels physically better. mood improved. no overt positive psychotic symptoms. can increase aripiprazole to 10mg qd.     Plan:  - Routine observation, no psychiatric indication for CO 1:1  [ ] INCREASE aripiprazole to 10mg qd for psychosis/mood  - If patient refuses treatment or attempts to leave AMA, psych can reassess for capacity   - Zyprexa 2.5-5mg PO/IM/IV q6hrs PRN for agitation  - CANNOT LEAVE AMA  - Dispo: when medically cleared will pursue inpatient psych due to impaired ability to care for self (impaired hygiene, recurrent infections requiring hospitalization, etc)

## 2023-05-11 NOTE — PROVIDER CONTACT NOTE (CRITICAL VALUE NOTIFICATION) - TEST AND RESULT REPORTED:
positive abscess culture with moderate MRSA
Gram positive cocci in clusters in aerobic bottle drawn on 5/6/23.

## 2023-05-12 LAB
ALBUMIN SERPL ELPH-MCNC: 3.8 G/DL — SIGNIFICANT CHANGE UP (ref 3.3–5)
ALP SERPL-CCNC: 161 U/L — HIGH (ref 40–120)
ALT FLD-CCNC: 58 U/L — HIGH (ref 4–41)
ANION GAP SERPL CALC-SCNC: 12 MMOL/L — SIGNIFICANT CHANGE UP (ref 7–14)
AST SERPL-CCNC: 22 U/L — SIGNIFICANT CHANGE UP (ref 4–40)
BILIRUB SERPL-MCNC: 0.5 MG/DL — SIGNIFICANT CHANGE UP (ref 0.2–1.2)
BUN SERPL-MCNC: 12 MG/DL — SIGNIFICANT CHANGE UP (ref 7–23)
CALCIUM SERPL-MCNC: 9.7 MG/DL — SIGNIFICANT CHANGE UP (ref 8.4–10.5)
CHLORIDE SERPL-SCNC: 89 MMOL/L — LOW (ref 98–107)
CO2 SERPL-SCNC: 26 MMOL/L — SIGNIFICANT CHANGE UP (ref 22–31)
CREAT SERPL-MCNC: 0.79 MG/DL — SIGNIFICANT CHANGE UP (ref 0.5–1.3)
EGFR: 105 ML/MIN/1.73M2 — SIGNIFICANT CHANGE UP
GLUCOSE BLDC GLUCOMTR-MCNC: 126 MG/DL — HIGH (ref 70–99)
GLUCOSE BLDC GLUCOMTR-MCNC: 148 MG/DL — HIGH (ref 70–99)
GLUCOSE SERPL-MCNC: 111 MG/DL — HIGH (ref 70–99)
MAGNESIUM SERPL-MCNC: 2.1 MG/DL — SIGNIFICANT CHANGE UP (ref 1.6–2.6)
PHOSPHATE SERPL-MCNC: 3.9 MG/DL — SIGNIFICANT CHANGE UP (ref 2.5–4.5)
POTASSIUM SERPL-MCNC: 4.9 MMOL/L — SIGNIFICANT CHANGE UP (ref 3.5–5.3)
POTASSIUM SERPL-SCNC: 4.9 MMOL/L — SIGNIFICANT CHANGE UP (ref 3.5–5.3)
PROT SERPL-MCNC: 7.5 G/DL — SIGNIFICANT CHANGE UP (ref 6–8.3)
SARS-COV-2 RNA SPEC QL NAA+PROBE: SIGNIFICANT CHANGE UP
SODIUM SERPL-SCNC: 127 MMOL/L — LOW (ref 135–145)
VANCOMYCIN TROUGH SERPL-MCNC: 9.2 UG/ML — LOW (ref 10–20)

## 2023-05-12 PROCEDURE — 99232 SBSQ HOSP IP/OBS MODERATE 35: CPT

## 2023-05-12 PROCEDURE — 99232 SBSQ HOSP IP/OBS MODERATE 35: CPT | Mod: GC

## 2023-05-12 RX ORDER — SODIUM CHLORIDE 9 MG/ML
1 INJECTION INTRAMUSCULAR; INTRAVENOUS; SUBCUTANEOUS
Refills: 0 | Status: DISCONTINUED | OUTPATIENT
Start: 2023-05-12 | End: 2023-05-14

## 2023-05-12 RX ORDER — ENOXAPARIN SODIUM 100 MG/ML
40 INJECTION SUBCUTANEOUS EVERY 24 HOURS
Refills: 0 | Status: DISCONTINUED | OUTPATIENT
Start: 2023-05-12 | End: 2023-06-06

## 2023-05-12 RX ORDER — LIDOCAINE 4 G/100G
1 CREAM TOPICAL EVERY 24 HOURS
Refills: 0 | Status: DISCONTINUED | OUTPATIENT
Start: 2023-05-12 | End: 2023-06-06

## 2023-05-12 RX ADMIN — BUDESONIDE AND FORMOTEROL FUMARATE DIHYDRATE 2 PUFF(S): 160; 4.5 AEROSOL RESPIRATORY (INHALATION) at 22:34

## 2023-05-12 RX ADMIN — SODIUM CHLORIDE 1 GRAM(S): 9 INJECTION INTRAMUSCULAR; INTRAVENOUS; SUBCUTANEOUS at 18:33

## 2023-05-12 RX ADMIN — Medication 1 APPLICATION(S): at 05:45

## 2023-05-12 RX ADMIN — Medication 300 MILLIGRAM(S): at 18:33

## 2023-05-12 RX ADMIN — ARIPIPRAZOLE 10 MILLIGRAM(S): 15 TABLET ORAL at 12:08

## 2023-05-12 RX ADMIN — OXYCODONE HYDROCHLORIDE 5 MILLIGRAM(S): 5 TABLET ORAL at 06:30

## 2023-05-12 RX ADMIN — Medication 2000 UNIT(S): at 12:08

## 2023-05-12 RX ADMIN — OXYCODONE HYDROCHLORIDE 5 MILLIGRAM(S): 5 TABLET ORAL at 10:13

## 2023-05-12 RX ADMIN — BUDESONIDE AND FORMOTEROL FUMARATE DIHYDRATE 2 PUFF(S): 160; 4.5 AEROSOL RESPIRATORY (INHALATION) at 10:14

## 2023-05-12 RX ADMIN — LOSARTAN POTASSIUM 100 MILLIGRAM(S): 100 TABLET, FILM COATED ORAL at 05:36

## 2023-05-12 RX ADMIN — OXYCODONE HYDROCHLORIDE 5 MILLIGRAM(S): 5 TABLET ORAL at 16:44

## 2023-05-12 RX ADMIN — ATORVASTATIN CALCIUM 40 MILLIGRAM(S): 80 TABLET, FILM COATED ORAL at 22:35

## 2023-05-12 RX ADMIN — OXYCODONE HYDROCHLORIDE 5 MILLIGRAM(S): 5 TABLET ORAL at 05:45

## 2023-05-12 RX ADMIN — Medication 300 MILLIGRAM(S): at 06:47

## 2023-05-12 RX ADMIN — ENOXAPARIN SODIUM 40 MILLIGRAM(S): 100 INJECTION SUBCUTANEOUS at 12:08

## 2023-05-12 RX ADMIN — OXYCODONE HYDROCHLORIDE 5 MILLIGRAM(S): 5 TABLET ORAL at 11:13

## 2023-05-12 RX ADMIN — Medication 300 MILLIGRAM(S): at 05:36

## 2023-05-12 RX ADMIN — Medication 1 APPLICATION(S): at 18:34

## 2023-05-12 RX ADMIN — OXYCODONE HYDROCHLORIDE 5 MILLIGRAM(S): 5 TABLET ORAL at 17:14

## 2023-05-12 NOTE — PROGRESS NOTE ADULT - PROBLEM SELECTOR PLAN 1
#Cellulitis c/b Sepsis  Pt with significant right gluteal wound progressive x1 weeks. P/w tachycardia, leukocytsosis c/w sepsis. Has h/o MRSA infection, requiring long course of vancomycin in the past. Historical Derm Bx negative for PG, but if persistent with poor wound healing, may reconsider further evaluation.  hx of left   left gluteal wound requiring debridement  surgery recommends multidisciplinary consults to determine etiology prior to debridement   TTE: ef: 60% unremarkable, endocardium could not be adequately visualized  5/6 BCX: MRSA; 5/8, 5/10 BCx: NGTD   s/p cefepime   UCx negative   - s/p wound care debridement 5/10, f/u path results, will f/u if any interventions needed inpatient  - Derm recs appreciated: not likely Pyoderma Gangrenosum  - c/w vancomycin (5/7-  ) 1500 q12 hours with  AUC between 400-600; will need until 6/5  - f/u ID recs

## 2023-05-12 NOTE — PROGRESS NOTE ADULT - ATTENDING COMMENTS
57M with PMH of schizoaffective d/o, CVID, HTN, DM2, hx of MRSA bacteremia likely in setting of LLE cellulitis who presents to the hospital w/ R gluteal drainage concerning for cellulitis abscess. Currently on empiric abx. Surgery on board. ID/derm/allergy, BH, immunology on board as well. For further management. Infectious workup now revealing MRSA bacteremia, likely related to his buttock abscess. Clinically stable on IV abx. Pending further wound care/improvement.    Pt seen at bedside. No pain. Feels well. No complaints. Understanding of his extended stay as he remains not psychiatrically cleared. Exam show lessening drainage from gluteal wound. Labs notable for Na 127.     #R gluteal abscess/cellulitis c/b MRSA bacteremia  #Chronic hyponatremia  #Schizoaffective d/o  #CVID  #HTN  #DM2    -Continue IV Vanco - dose by AUC - appreciate pharm assistance. Appreciate ID recs.  -Monitor Na - start salt tab 1g BID.  -DM controlled at this point.  -CANNOT AMA - Dispo planning w/   - Inpt psych cannot take pt with PICC. No oral option given MRSA bacteremia and potential interaction with his oral psych meds. Will further discuss w/ ID. For now, can txsfer to 7S for further optimization. Appreciate BH recs.    Rest of plan as above. Suturegard Body: The suture ends were repeatedly re-tightened and re-clamped to achieve the desired tissue expansion.

## 2023-05-12 NOTE — PROGRESS NOTE ADULT - SUBJECTIVE AND OBJECTIVE BOX
Kinsey Camacho MD    Internal Medicine Resident (PGY-2)  ___________________________________________________________________________________________________      DAT WOOD 57y Male    Overnight events/subjective: No acute overnight events. Patient seen and examined at bedside. No complaints. Denies fever, chills, chest pain, shortness of breath, abdominal pain, nausea, vomiting, changes in bowel habits, or urinary symptoms.    Vital Signs Last 24 Hrs  T(C): 37.1 (12 May 2023 05:13), Max: 37.1 (12 May 2023 05:13)  T(F): 98.8 (12 May 2023 05:13), Max: 98.8 (12 May 2023 05:13)  HR: 88 (12 May 2023 05:13) (61 - 88)  BP: 140/81 (12 May 2023 05:13) (129/66 - 140/81)  BP(mean): --  RR: 18 (12 May 2023 05:13) (18 - 18)  SpO2: 96% (12 May 2023 05:13) (95% - 96%)    Parameters below as of 12 May 2023 05:13  Patient On (Oxygen Delivery Method): nasal cannula        PHYSICAL EXAM:  GENERAL: no acute distress  HEENT - atraumatic, normocephalic, pupils equal and reactive to light; extraocular muscles intact  CV: regular rate and rhythym; normal S1/S2; nor murmurs, rubs, or gallops  PULM: clear to auscultation bilaterally; no rales, rhonchi, wheezing  ABDOMEN: soft, nontender, nondistended; bowel sounds present  MSK: extremities atraumatic; ny cyanosis or clubbing  SKIN: warm, dry, no rashes or lesions  NEURO:  no focal deficits, sensory and motor grossly intact  PSYCH: alert and oriented x3; appropriate mood and affect    HOSPITAL MEDICATIONS:  MEDICATIONS  (STANDING):  ARIPiprazole 10 milliGRAM(s) Oral daily  atorvastatin 40 milliGRAM(s) Oral at bedtime  budesonide 160 MICROgram(s)/formoterol 4.5 MICROgram(s) Inhaler 2 Puff(s) Inhalation two times a day  cholecalciferol 2000 Unit(s) Oral daily  Dakins Solution - 1/4 Strength 1 Application(s) Topical two times a day  diltiazem    milliGRAM(s) Oral daily  losartan 100 milliGRAM(s) Oral daily  vancomycin  IVPB 1500 milliGRAM(s) IV Intermittent every 12 hours    MEDICATIONS  (PRN):  acetaminophen     Tablet .. 975 milliGRAM(s) Oral every 6 hours PRN Temp greater or equal to 38C (100.4F), Mild Pain (1 - 3), Moderate Pain (4 - 6), Severe Pain (7 - 10)  albuterol    90 MICROgram(s) HFA Inhaler 2 Puff(s) Inhalation every 6 hours PRN Shortness of Breath and/or Wheezing  OLANZapine 5 milliGRAM(s) Oral every 6 hours PRN Agitation  oxyCODONE    IR 5 milliGRAM(s) Oral every 4 hours PRN Moderate Pain (4 - 6)      LABS:    05-12    127<L>  |  89<L>  |  12  ----------------------------<  111<H>  4.9   |  26  |  0.79    Ca    9.7      12 May 2023 05:55  Phos  3.9     05-12  Mg     2.10     05-12    TPro  7.5  /  Alb  3.8  /  TBili  0.5  /  DBili  x   /  AST  22  /  ALT  58<H>  /  AlkPhos  161<H>  05-12           Kinsey Camacho MD    Internal Medicine Resident (PGY-2)  ___________________________________________________________________________________________________      DAT WOOD 57y Male    Overnight events/subjective: No acute overnight events. Patient seen and examined at bedside. Endorsing L sided rib pain x4d. No other complaints.    Vital Signs Last 24 Hrs  T(C): 37.1 (12 May 2023 05:13), Max: 37.1 (12 May 2023 05:13)  T(F): 98.8 (12 May 2023 05:13), Max: 98.8 (12 May 2023 05:13)  HR: 88 (12 May 2023 05:13) (61 - 88)  BP: 140/81 (12 May 2023 05:13) (129/66 - 140/81)  BP(mean): --  RR: 18 (12 May 2023 05:13) (18 - 18)  SpO2: 96% (12 May 2023 05:13) (95% - 96%)    Parameters below as of 12 May 2023 05:13  Patient On (Oxygen Delivery Method): nasal cannula        PHYSICAL EXAM:  GENERAL: no acute distress  HEENT - atraumatic, normocephalic, EOMI  CV: RRR, no murmurs  PULM: CTAB  ABDOMEN: soft, nontender, nondistended  MSK: extremities atraumatic; no cyanosis or clubbing  SKIN: R gluteal wound with surrounding erythema  NEURO:  no focal deficits, sensory and motor grossly intact  PSYCH: alert and oriented x3    HOSPITAL MEDICATIONS:  MEDICATIONS  (STANDING):  ARIPiprazole 10 milliGRAM(s) Oral daily  atorvastatin 40 milliGRAM(s) Oral at bedtime  budesonide 160 MICROgram(s)/formoterol 4.5 MICROgram(s) Inhaler 2 Puff(s) Inhalation two times a day  cholecalciferol 2000 Unit(s) Oral daily  Dakins Solution - 1/4 Strength 1 Application(s) Topical two times a day  diltiazem    milliGRAM(s) Oral daily  losartan 100 milliGRAM(s) Oral daily  vancomycin  IVPB 1500 milliGRAM(s) IV Intermittent every 12 hours    MEDICATIONS  (PRN):  acetaminophen     Tablet .. 975 milliGRAM(s) Oral every 6 hours PRN Temp greater or equal to 38C (100.4F), Mild Pain (1 - 3), Moderate Pain (4 - 6), Severe Pain (7 - 10)  albuterol    90 MICROgram(s) HFA Inhaler 2 Puff(s) Inhalation every 6 hours PRN Shortness of Breath and/or Wheezing  OLANZapine 5 milliGRAM(s) Oral every 6 hours PRN Agitation  oxyCODONE    IR 5 milliGRAM(s) Oral every 4 hours PRN Moderate Pain (4 - 6)      LABS:    05-12    127<L>  |  89<L>  |  12  ----------------------------<  111<H>  4.9   |  26  |  0.79    Ca    9.7      12 May 2023 05:55  Phos  3.9     05-12  Mg     2.10     05-12    TPro  7.5  /  Alb  3.8  /  TBili  0.5  /  DBili  x   /  AST  22  /  ALT  58<H>  /  AlkPhos  161<H>  05-12

## 2023-05-12 NOTE — PROGRESS NOTE ADULT - SUBJECTIVE AND OBJECTIVE BOX
Follow Up:  MRSA bacteremia and abscess    Interval History/ROS: no fever, cough, abd pain, denied pain at the abscess site          Allergies  amoxicillin (Fever)  penicillin (Rash)        ANTIMICROBIALS:  vancomycin  IVPB 1500 every 12 hours      OTHER MEDS:  acetaminophen     Tablet .. 975 milliGRAM(s) Oral every 6 hours PRN  albuterol    90 MICROgram(s) HFA Inhaler 2 Puff(s) Inhalation every 6 hours PRN  ARIPiprazole 10 milliGRAM(s) Oral daily  atorvastatin 40 milliGRAM(s) Oral at bedtime  budesonide 160 MICROgram(s)/formoterol 4.5 MICROgram(s) Inhaler 2 Puff(s) Inhalation two times a day  cholecalciferol 2000 Unit(s) Oral daily  Dakins Solution - 1/4 Strength 1 Application(s) Topical two times a day  diltiazem    milliGRAM(s) Oral daily  enoxaparin Injectable 40 milliGRAM(s) SubCutaneous every 24 hours  lidocaine   4% Patch 1 Patch Transdermal every 24 hours  losartan 100 milliGRAM(s) Oral daily  OLANZapine 5 milliGRAM(s) Oral every 6 hours PRN  oxyCODONE    IR 5 milliGRAM(s) Oral every 4 hours PRN  sodium chloride 1 Gram(s) Oral two times a day      Vital Signs Last 24 Hrs  T(C): 36.8 (12 May 2023 14:26), Max: 37.1 (12 May 2023 05:13)  T(F): 98.2 (12 May 2023 14:26), Max: 98.8 (12 May 2023 05:13)  HR: 87 (12 May 2023 14:26) (61 - 88)  BP: 110/71 (12 May 2023 14:26) (110/71 - 140/81)  BP(mean): --  RR: 18 (12 May 2023 14:26) (18 - 18)  SpO2: 92% (12 May 2023 14:26) (92% - 96%)    Parameters below as of 12 May 2023 14:26  Patient On (Oxygen Delivery Method): room air        Physical Exam:  General:    NAD,  non toxic  Respiratory:    comfortable on RA  abd:     soft,   BS +,   no tenderness  :   no CVAT,  no suprapubic tenderness,   no  arceo  Musculoskeletal:   no joint swelling  vascular: no phlebitis  Skin:   +R buttock wound s/p I&D, still with surrounding erythema and firmness        05-12    127<L>  |  89<L>  |  12  ----------------------------<  111<H>  4.9   |  26  |  0.79    Ca    9.7      12 May 2023 05:55  Phos  3.9     05-12  Mg     2.10     05-12    TPro  7.5  /  Alb  3.8  /  TBili  0.5  /  DBili  x   /  AST  22  /  ALT  58<H>  /  AlkPhos  161<H>  05-12          MICROBIOLOGY:  Vancomycin Level, Trough: 9.2 ug/mL (05-12-23 @ 05:55)  v  .Blood Blood-Peripheral  05-10-23   No growth to date.  --  --      .Blood Blood-Peripheral  05-10-23   No growth to date.  --  --      .Abscess right buttock  05-09-23   Moderate Methicillin Resistant Staphylococcus aureus  --  Methicillin resistant Staphylococcus aureus      .Blood Blood-Peripheral  05-08-23   No growth to date.  --  --      .Blood Blood-Peripheral  05-08-23   No growth to date.  --  --      Clean Catch Clean Catch (Midstream)  05-06-23   No growth  --  --      .Blood Blood-Peripheral  05-06-23   Growth in aerobic bottle: Methicillin Resistant Staphylococcus aureus  ***Blood Panel PCR results on this specimen are available  approximately 3 hours after the Gram stain result.***  Gram stain, PCR, and/or culture results may not always  correspond due to difference in methodologies.  ************************************************************  This PCR assay was performed by multiplex PCR. This  Assay tests for 66 bacterial and resistance gene targets.  Please refer to the [facility] test directory  at [email or web address] for details.  --  Blood Culture PCR  Methicillin resistant Staphylococcus aureus      .Blood Blood-Peripheral  05-06-23   No Growth Final  --  --                RADIOLOGY:  Images independently visualized and reviewed personally, findings as below  < from: CT Pelvis w/ IV Cont (05.06.23 @ 21:22) >  IMPRESSION:  Moderate subcutaneous inflammatory change and skin thickening in the   right buttocks suggestive of a cellulitis. No associated rim-enhancing   fluid collection to suggest an abscess. No subcutaneous gas.      < end of copied text >  < from: Transthoracic Echocardiogram (05.08.23 @ 16:12) >  CONCLUSIONS:  1. Normal mitral valve. Minimal mitral regurgitation.  2. Aortic valve leaflet morphology not well visualized.  Mild aortic regurgitation.  3. Normal left ventricular internal dimensions and wall  thicknesses.  4. Normal left ventricular systolic function. No segmental  wall motion abnormalities.  5. Normal left ventricular diastolic function.  6. The right ventricle is not well visualized; grossly  normal right ventricular systolic function.  Unable to exclude endocarditis.  Consider LINDA for further  evaluation, if clinically indicated.    < end of copied text >

## 2023-05-12 NOTE — BH CONSULTATION LIAISON PROGRESS NOTE - NSBHASSESSMENTFT_PSY_ALL_CORE
56M, domiciled in supportive housing TSI, SSD, single, PMH HTN, DM, hypogammaglobulinemia, PPHx schizoaffective d/o, bipolar d/o, hx of multiple psych hospitalizations (last known in 2020 at [facility]), denies hx SA/NSSIB, denies drug or alcohol use, denies legal hx, who presented with right gluteal wound, admitted to medicine for cellulutis. Psychiatry consulted for psychosis/schizophrenia.    On assessment, patient is calm and cooperative. AAO3. No overt symptoms of psychosis, depression, or yo. No S/I or H/I. Patient w/ recurrent/chronic infections. Patient has left multiple hospitals AMA while undergoing treatment for cellulitis. However, patient recognizes benefit of continuing w/ antibiotic treatment, as well as risks of discontinuing treatment. Patient not currently refusing treatment or attempting to leave AMA. Patient interested in reconnection to outpatient care after discharge. Patient was due for Haldol decanoate 150mg on 4/16. Will obtain collateral see when last received.     5/9: no interval change. no overt positive symptoms of psychosis. patient w/ likely negative/cognitive symptoms that impair ability to adequately care for self (e.g. likely contribute to poor hygiene, recurrent infections, etc.). no si or hi. interested in resuming outpatient care and restarting psychotropics. recommend aripiprazole 5mg qd for psychosis/mood (w/ reported history of depression).     5/10: status quo. no psychosis or depression. patient w/ impaired ability to care for self, likely a result of negative/cognitive symptoms. per chart review, patient has not seen outpt psychiatrist since 9/22. cannot leave AMA. will consider inpatient psych once medically cleared.     5/11: feels physically better. mood improved. no overt positive psychotic symptoms. can increase aripiprazole to 10mg qd.     5/12: minimal interval change. pleasant, calm, cooperative, likely cognitively limited due to chronic SMI. c/w aripiprazole 10mg    Plan:  - Routine observation, no psychiatric indication for CO 1:1  - c/w aripiprazole to 10mg qd for psychosis/mood  - If patient refuses treatment or attempts to leave AMA, psych can reassess for capacity   - Zyprexa 2.5-5mg PO/IM/IV q6hrs PRN for agitation  - CANNOT LEAVE AMA  - Dispo: when medically cleared will pursue inpatient psych due to impaired ability to care for self (impaired hygiene, recurrent infections requiring hospitalization, etc)

## 2023-05-12 NOTE — PROGRESS NOTE ADULT - PROBLEM SELECTOR PLAN 10
Diet: CC  DVT: lovenox  Dispo: Inpatient psych Diet: CC  DVT: lovenox  Dispo: Unable to give IV antibiotics at [facility], may need to stay inpatient until 6/5 for IV abx unless recommendation for inpatient psychiatry changes

## 2023-05-12 NOTE — PROGRESS NOTE ADULT - PROBLEM SELECTOR PLAN 2
Historically diagnosed. Pt confirms schizophrenia but denies being on any active psychotropic medications. Denies AH/VH. Denies SI/HI. Does not specify why he left AMA from other hospital, but does understand that he needs help here. historically lacked capacity to leave AMA, but not trying at this time.  - No need for 1:1 at this time  - Psychiatry/ Consult recs appreciated  - c/w aripiprazole 10 mg qd for psychosis/mood  - C/W zyrprexa 5 PO for agitation  - Per , pt cannot leave AMA at this time.  plans for inpatient psych after medically cleared

## 2023-05-12 NOTE — PROGRESS NOTE ADULT - PROBLEM SELECTOR PLAN 5
Pt with h/o variable hyponatremia. SOsm calc c/w hypotonic hyponatremia. Farrukh low suggestive of sodium-avid state (i.e. RAAS activation), UOsm quite low, though c/f psychogenic polydipsia vs poor solute intake.. Unclear contribution of ARB as OP as pt not filling medications consistently.   likely 2/2 SIADH as improved with fluid restriction   - Trend Na   - c/w fluid restriction to 1L

## 2023-05-12 NOTE — PROGRESS NOTE ADULT - ASSESSMENT
57 m with bipolar, schizophrenia, HTN,  bronchiectasis, CVID, hypogammaglobulinemia (monthly IVIG), previous LLE cellulitis/abscess and MRSA bacteremia, went to [facility] for R buttock wound, cellulitis but signed out AMA and was given clinda now p/w worsening pain/edema  here afebrile, WBC: 15  abd/pelvis CT: buttock cellulitis, no abscess seen  blood cx: MRSA, repeat negative 5/8  s/p wound debridement by wound care 5/10, had Necrotic dermis and subcutaneous fatty tissue Into subcutaneous tissues, cx also with MRSA  TTE unable to exclude endocarditis    leukocytosis, MRSA bacteremia due to buttock cellulitis and abscess in the setting of CVID, on monthly IVIG and previous MRSA bacteremia with abscess  repeat blood cx negative 5/8, TTE unable to exclude endocarditis   s/p I&D of necrotic skin and fatty tissue 5/10, cx with MRSA aswell      * c/w 1500 q 12 (based on AUC calculation)   * monitor vanco trough and renal function to prevent nephrotoxicity  * f/u with wound care  * will need a 4 week course of vanco post negative blood cx through 6/5  * weekly CBC, CMP, vanco trough while on antibiotics       The above assessment and plan was discussed with the primary team    Genesis Ramirez MD  contact on teams  After 5pm and on weekends call [phone]

## 2023-05-12 NOTE — PROGRESS NOTE ADULT - PROBLEM SELECTOR PLAN 4
PT with h/o CVID requiring IgG infusions q1mo (last 4/13/2023).  OP Immunologist: Dr. Mitchell Boxer ([facility])  IGg level low at 489   s/p gammagard 75 grams on 5/8. D/w A&I fellow Dr. Kimura on 5/10, no need to re-dose with gammagard S/D at this time. Can resume usual monthly infusions after discharge.    - Allergy and Immunology recs appreciated   -recorded allergy to amoxicillin and penicillin however tolerated augmentin in the past, may re-address allergy if he needs a beta lactem

## 2023-05-12 NOTE — BH CONSULTATION LIAISON PROGRESS NOTE - ATTENDING COMMENTS
Chart reviewed. Discussed with resident. Agree with above assessment. May need to stay in medical setting for PICC/IV ABX, consider 7S/[facility]ful [facility] unit if facing a prolonged admission. He is not psychiatrically cleared at this time.

## 2023-05-13 LAB
ANION GAP SERPL CALC-SCNC: 18 MMOL/L — HIGH (ref 7–14)
BUN SERPL-MCNC: 17 MG/DL — SIGNIFICANT CHANGE UP (ref 7–23)
CALCIUM SERPL-MCNC: 9.2 MG/DL — SIGNIFICANT CHANGE UP (ref 8.4–10.5)
CHLORIDE SERPL-SCNC: 90 MMOL/L — LOW (ref 98–107)
CO2 SERPL-SCNC: 20 MMOL/L — LOW (ref 22–31)
CREAT SERPL-MCNC: 0.87 MG/DL — SIGNIFICANT CHANGE UP (ref 0.5–1.3)
CULTURE RESULTS: SIGNIFICANT CHANGE UP
CULTURE RESULTS: SIGNIFICANT CHANGE UP
EGFR: 102 ML/MIN/1.73M2 — SIGNIFICANT CHANGE UP
GLUCOSE BLDC GLUCOMTR-MCNC: 127 MG/DL — HIGH (ref 70–99)
GLUCOSE SERPL-MCNC: 89 MG/DL — SIGNIFICANT CHANGE UP (ref 70–99)
MAGNESIUM SERPL-MCNC: 2.2 MG/DL — SIGNIFICANT CHANGE UP (ref 1.6–2.6)
PHOSPHATE SERPL-MCNC: 4.4 MG/DL — SIGNIFICANT CHANGE UP (ref 2.5–4.5)
POTASSIUM SERPL-MCNC: 5.1 MMOL/L — SIGNIFICANT CHANGE UP (ref 3.5–5.3)
POTASSIUM SERPL-SCNC: 5.1 MMOL/L — SIGNIFICANT CHANGE UP (ref 3.5–5.3)
SODIUM SERPL-SCNC: 128 MMOL/L — LOW (ref 135–145)
[facility] SOURCE: SIGNIFICANT CHANGE UP
[facility] SOURCE: SIGNIFICANT CHANGE UP
VANCOMYCIN TROUGH SERPL-MCNC: 9 UG/ML — LOW (ref 10–20)

## 2023-05-13 PROCEDURE — 99232 SBSQ HOSP IP/OBS MODERATE 35: CPT

## 2023-05-13 RX ORDER — VANCOMYCIN HCL 1 G
1500 VIAL (EA) INTRAVENOUS EVERY 8 HOURS
Refills: 0 | Status: DISCONTINUED | OUTPATIENT
Start: 2023-05-13 | End: 2023-05-14

## 2023-05-13 RX ADMIN — OXYCODONE HYDROCHLORIDE 5 MILLIGRAM(S): 5 TABLET ORAL at 00:05

## 2023-05-13 RX ADMIN — ATORVASTATIN CALCIUM 40 MILLIGRAM(S): 80 TABLET, FILM COATED ORAL at 21:38

## 2023-05-13 RX ADMIN — ARIPIPRAZOLE 10 MILLIGRAM(S): 15 TABLET ORAL at 11:28

## 2023-05-13 RX ADMIN — Medication 300 MILLIGRAM(S): at 08:48

## 2023-05-13 RX ADMIN — LOSARTAN POTASSIUM 100 MILLIGRAM(S): 100 TABLET, FILM COATED ORAL at 05:13

## 2023-05-13 RX ADMIN — OXYCODONE HYDROCHLORIDE 5 MILLIGRAM(S): 5 TABLET ORAL at 00:35

## 2023-05-13 RX ADMIN — Medication 1 APPLICATION(S): at 05:12

## 2023-05-13 RX ADMIN — BUDESONIDE AND FORMOTEROL FUMARATE DIHYDRATE 2 PUFF(S): 160; 4.5 AEROSOL RESPIRATORY (INHALATION) at 21:39

## 2023-05-13 RX ADMIN — SODIUM CHLORIDE 1 GRAM(S): 9 INJECTION INTRAMUSCULAR; INTRAVENOUS; SUBCUTANEOUS at 05:13

## 2023-05-13 RX ADMIN — Medication 1 APPLICATION(S): at 17:16

## 2023-05-13 RX ADMIN — SODIUM CHLORIDE 1 GRAM(S): 9 INJECTION INTRAMUSCULAR; INTRAVENOUS; SUBCUTANEOUS at 17:16

## 2023-05-13 RX ADMIN — OXYCODONE HYDROCHLORIDE 5 MILLIGRAM(S): 5 TABLET ORAL at 23:01

## 2023-05-13 RX ADMIN — Medication 300 MILLIGRAM(S): at 17:16

## 2023-05-13 RX ADMIN — OXYCODONE HYDROCHLORIDE 5 MILLIGRAM(S): 5 TABLET ORAL at 22:31

## 2023-05-13 RX ADMIN — Medication 2000 UNIT(S): at 11:28

## 2023-05-13 RX ADMIN — ENOXAPARIN SODIUM 40 MILLIGRAM(S): 100 INJECTION SUBCUTANEOUS at 11:28

## 2023-05-13 RX ADMIN — BUDESONIDE AND FORMOTEROL FUMARATE DIHYDRATE 2 PUFF(S): 160; 4.5 AEROSOL RESPIRATORY (INHALATION) at 08:48

## 2023-05-13 RX ADMIN — Medication 300 MILLIGRAM(S): at 05:13

## 2023-05-13 NOTE — PROGRESS NOTE ADULT - SUBJECTIVE AND OBJECTIVE BOX
Patient is a 57y old  Male who presents with a chief complaint of Gluteal wound (12 May 2023 07:50)      SUBJECTIVE / OVERNIGHT EVENTS: no events. No complaints this morning, RLE with no pain, tolerating PO.     12 point ROS negative except as noted above.     MEDICATIONS  (STANDING):  ARIPiprazole 10 milliGRAM(s) Oral daily  atorvastatin 40 milliGRAM(s) Oral at bedtime  budesonide 160 MICROgram(s)/formoterol 4.5 MICROgram(s) Inhaler 2 Puff(s) Inhalation two times a day  cholecalciferol 2000 Unit(s) Oral daily  Dakins Solution - 1/4 Strength 1 Application(s) Topical two times a day  diltiazem    milliGRAM(s) Oral daily  enoxaparin Injectable 40 milliGRAM(s) SubCutaneous every 24 hours  lidocaine   4% Patch 1 Patch Transdermal every 24 hours  losartan 100 milliGRAM(s) Oral daily  sodium chloride 1 Gram(s) Oral two times a day  vancomycin  IVPB 1500 milliGRAM(s) IV Intermittent every 8 hours    MEDICATIONS  (PRN):  acetaminophen     Tablet .. 975 milliGRAM(s) Oral every 6 hours PRN Temp greater or equal to 38C (100.4F), Mild Pain (1 - 3), Moderate Pain (4 - 6), Severe Pain (7 - 10)  albuterol    90 MICROgram(s) HFA Inhaler 2 Puff(s) Inhalation every 6 hours PRN Shortness of Breath and/or Wheezing  OLANZapine 5 milliGRAM(s) Oral every 6 hours PRN Agitation  oxyCODONE    IR 5 milliGRAM(s) Oral every 4 hours PRN Moderate Pain (4 - 6)      CAPILLARY BLOOD GLUCOSE      POCT Blood Glucose.: 127 mg/dL (13 May 2023 00:07)  POCT Blood Glucose.: 126 mg/dL (12 May 2023 12:57)    I&O's Summary    12 May 2023 07:01  -  13 May 2023 07:00  --------------------------------------------------------  IN: 600 mL / OUT: 0 mL / NET: 600 mL        Vital Signs Last 24 Hrs  T(C): 37.3 (13 May 2023 05:10), Max: 37.3 (13 May 2023 05:10)  T(F): 99.1 (13 May 2023 05:10), Max: 99.1 (13 May 2023 05:10)  HR: 87 (13 May 2023 05:10) (84 - 87)  BP: 132/67 (13 May 2023 05:10) (110/71 - 132/67)  BP(mean): --  RR: 20 (13 May 2023 05:10) (18 - 20)  SpO2: 92% (13 May 2023 05:10) (92% - 92%)    Parameters below as of 13 May 2023 05:10  Patient On (Oxygen Delivery Method): nasal cannula  O2 Flow (L/min): 2      PHYSICAL EXAM:  GENERAL: NAD, well-developed, well-nourished  HEAD: Atraumatic, Normocephalic  EYES: EOMI, PERRLA, conjunctiva and sclera clear  NECK: Supple, No JVD  CHEST/LUNG: Clear to auscultation bilaterally; No wheezes or crackles  HEART: Normal S1/S2; Regular rate and rhythm; No murmurs, rubs, or gallops  ABDOMEN: Soft, Nontender, Nondistended; Bowel sounds present  EXTREMITIES: 2+ Peripheral Pulses; No clubbing, cyanosis, or edema  PSYCH: A&Ox3  NEUROLOGY: no focal neurologic deficit  SKIN: No rashes or lesions    LABS:     05-13    128<L>  |  90<L>  |  17  ----------------------------<  89  5.1   |  20<L>  |  0.87    Ca    9.2      13 May 2023 05:30  Phos  4.4     05-13  Mg     2.20     05-13    TPro  7.5  /  Alb  3.8  /  TBili  0.5  /  DBili  x   /  AST  22  /  ALT  58<H>  /  AlkPhos  161<H>  05-12              RADIOLOGY & ADDITIONAL TESTS:    Imaging Personally Reviewed:    Consultant(s) Notes Reviewed:      Care Discussed with Consultants/Other Providers:

## 2023-05-13 NOTE — PROGRESS NOTE ADULT - PROBLEM SELECTOR PLAN 10
Diet: CC  DVT: lovenox  Dispo: Unable to give IV antibiotics at [facility], may need to stay inpatient until 6/5 for IV abx unless recommendation for inpatient psychiatry changes

## 2023-05-13 NOTE — PROGRESS NOTE ADULT - ATTENDING COMMENTS
57M with schizoaffective d/o, CVID admit for management of MRSA bacteremia secondary to LLE wound. patient will need IV vancomycin until 6/5. unable to accommodate patient at [facility] due to such. ctm clinically. patient seen today. LLE wound is open and bleeding. per patient, he scratched it as it was itching. he has no other complaints. spoke to nursing to redress his wound. ctm clinically.

## 2023-05-13 NOTE — PROGRESS NOTE ADULT - PROBLEM SELECTOR PLAN 4
PT with h/o CVID requiring IgG infusions q1mo (last 4/13/2023).  OP Immunologist: Dr. Mitchell Boxer ([facility])  IGg level low at 489   s/p gammagard 75 grams on 5/8. D/w A&I fellow Dr. Kimura on 5/10, no need to re-dose with gammagard S/D at this time. Can resume usual monthly infusions after discharge.    - Allergy and Immunology recs appreciated   -recorded allergy to amoxicillin and penicillin however tolerated augmentin in the past, may re-address allergy if he needs a beta lactem

## 2023-05-14 LAB
ANION GAP SERPL CALC-SCNC: 12 MMOL/L — SIGNIFICANT CHANGE UP (ref 7–14)
BUN SERPL-MCNC: 13 MG/DL — SIGNIFICANT CHANGE UP (ref 7–23)
CALCIUM SERPL-MCNC: 9 MG/DL — SIGNIFICANT CHANGE UP (ref 8.4–10.5)
CHLORIDE SERPL-SCNC: 89 MMOL/L — LOW (ref 98–107)
CO2 SERPL-SCNC: 26 MMOL/L — SIGNIFICANT CHANGE UP (ref 22–31)
CREAT SERPL-MCNC: 0.85 MG/DL — SIGNIFICANT CHANGE UP (ref 0.5–1.3)
CULTURE RESULTS: SIGNIFICANT CHANGE UP
EGFR: 103 ML/MIN/1.73M2 — SIGNIFICANT CHANGE UP
GLUCOSE SERPL-MCNC: 96 MG/DL — SIGNIFICANT CHANGE UP (ref 70–99)
MAGNESIUM SERPL-MCNC: 2.1 MG/DL — SIGNIFICANT CHANGE UP (ref 1.6–2.6)
ORGANISM # SPEC MICROSCOPIC CNT: SIGNIFICANT CHANGE UP
ORGANISM # SPEC MICROSCOPIC CNT: SIGNIFICANT CHANGE UP
PHOSPHATE SERPL-MCNC: 4.2 MG/DL — SIGNIFICANT CHANGE UP (ref 2.5–4.5)
POTASSIUM SERPL-MCNC: 4.4 MMOL/L — SIGNIFICANT CHANGE UP (ref 3.5–5.3)
POTASSIUM SERPL-SCNC: 4.4 MMOL/L — SIGNIFICANT CHANGE UP (ref 3.5–5.3)
SODIUM SERPL-SCNC: 127 MMOL/L — LOW (ref 135–145)
[facility] SOURCE: SIGNIFICANT CHANGE UP
VANCOMYCIN TROUGH SERPL-MCNC: 12.8 UG/ML — SIGNIFICANT CHANGE UP (ref 10–20)

## 2023-05-14 PROCEDURE — 99232 SBSQ HOSP IP/OBS MODERATE 35: CPT | Mod: GC

## 2023-05-14 RX ORDER — SODIUM CHLORIDE 9 MG/ML
1 INJECTION INTRAMUSCULAR; INTRAVENOUS; SUBCUTANEOUS THREE TIMES A DAY
Refills: 0 | Status: DISCONTINUED | OUTPATIENT
Start: 2023-05-14 | End: 2023-06-06

## 2023-05-14 RX ORDER — VANCOMYCIN HCL 1 G
1750 VIAL (EA) INTRAVENOUS EVERY 8 HOURS
Refills: 0 | Status: DISCONTINUED | OUTPATIENT
Start: 2023-05-14 | End: 2023-05-15

## 2023-05-14 RX ADMIN — ARIPIPRAZOLE 10 MILLIGRAM(S): 15 TABLET ORAL at 11:44

## 2023-05-14 RX ADMIN — BUDESONIDE AND FORMOTEROL FUMARATE DIHYDRATE 2 PUFF(S): 160; 4.5 AEROSOL RESPIRATORY (INHALATION) at 21:44

## 2023-05-14 RX ADMIN — BUDESONIDE AND FORMOTEROL FUMARATE DIHYDRATE 2 PUFF(S): 160; 4.5 AEROSOL RESPIRATORY (INHALATION) at 11:43

## 2023-05-14 RX ADMIN — SODIUM CHLORIDE 1 GRAM(S): 9 INJECTION INTRAMUSCULAR; INTRAVENOUS; SUBCUTANEOUS at 06:02

## 2023-05-14 RX ADMIN — SODIUM CHLORIDE 1 GRAM(S): 9 INJECTION INTRAMUSCULAR; INTRAVENOUS; SUBCUTANEOUS at 21:49

## 2023-05-14 RX ADMIN — Medication 250 MILLIGRAM(S): at 21:38

## 2023-05-14 RX ADMIN — ENOXAPARIN SODIUM 40 MILLIGRAM(S): 100 INJECTION SUBCUTANEOUS at 11:44

## 2023-05-14 RX ADMIN — Medication 1 APPLICATION(S): at 06:03

## 2023-05-14 RX ADMIN — Medication 300 MILLIGRAM(S): at 06:03

## 2023-05-14 RX ADMIN — SODIUM CHLORIDE 1 GRAM(S): 9 INJECTION INTRAMUSCULAR; INTRAVENOUS; SUBCUTANEOUS at 13:34

## 2023-05-14 RX ADMIN — Medication 2000 UNIT(S): at 11:44

## 2023-05-14 RX ADMIN — LOSARTAN POTASSIUM 100 MILLIGRAM(S): 100 TABLET, FILM COATED ORAL at 06:03

## 2023-05-14 RX ADMIN — ATORVASTATIN CALCIUM 40 MILLIGRAM(S): 80 TABLET, FILM COATED ORAL at 21:49

## 2023-05-14 RX ADMIN — Medication 1 APPLICATION(S): at 17:28

## 2023-05-14 RX ADMIN — Medication 300 MILLIGRAM(S): at 01:04

## 2023-05-14 RX ADMIN — Medication 250 MILLIGRAM(S): at 13:33

## 2023-05-14 NOTE — PROGRESS NOTE ADULT - SUBJECTIVE AND OBJECTIVE BOX
PROGRESS NOTE:   Authored by Dr. Ashleigh Leigh MD (PGY-1). Pager [facility] [phone] / [facility]     Patient is a 57y old  Male who presents with a chief complaint of Gluteal wound (12 May 2023 07:50)      SUBJECTIVE / OVERNIGHT EVENTS:  No acute events overnight.     ADDITIONAL REVIEW OF SYSTEMS:  Patient denies fevers, chills, chest pain, shortness of breath, nausea, abdominal pain, diarrhea, constipation, dysuria, leg swelling, headache, light headedness.    MEDICATIONS  (STANDING):  ARIPiprazole 10 milliGRAM(s) Oral daily  atorvastatin 40 milliGRAM(s) Oral at bedtime  budesonide 160 MICROgram(s)/formoterol 4.5 MICROgram(s) Inhaler 2 Puff(s) Inhalation two times a day  cholecalciferol 2000 Unit(s) Oral daily  Dakins Solution - 1/4 Strength 1 Application(s) Topical two times a day  diltiazem    milliGRAM(s) Oral daily  enoxaparin Injectable 40 milliGRAM(s) SubCutaneous every 24 hours  lidocaine   4% Patch 1 Patch Transdermal every 24 hours  losartan 100 milliGRAM(s) Oral daily  sodium chloride 1 Gram(s) Oral two times a day  vancomycin  IVPB 1500 milliGRAM(s) IV Intermittent every 8 hours    MEDICATIONS  (PRN):  acetaminophen     Tablet .. 975 milliGRAM(s) Oral every 6 hours PRN Temp greater or equal to 38C (100.4F), Mild Pain (1 - 3), Moderate Pain (4 - 6), Severe Pain (7 - 10)  albuterol    90 MICROgram(s) HFA Inhaler 2 Puff(s) Inhalation every 6 hours PRN Shortness of Breath and/or Wheezing  OLANZapine 5 milliGRAM(s) Oral every 6 hours PRN Agitation  oxyCODONE    IR 5 milliGRAM(s) Oral every 4 hours PRN Moderate Pain (4 - 6)      CAPILLARY BLOOD GLUCOSE        I&O's Summary    13 May 2023 07:01  -  14 May 2023 07:00  --------------------------------------------------------  IN: 1000 mL / OUT: 0 mL / NET: 1000 mL        PHYSICAL EXAM:  Vital Signs Last 24 Hrs  T(C): 36.8 (14 May 2023 05:52), Max: 36.8 (13 May 2023 15:15)  T(F): 98.2 (14 May 2023 05:52), Max: 98.2 (13 May 2023 15:15)  HR: 97 (14 May 2023 05:52) (79 - 97)  BP: 149/79 (14 May 2023 05:52) (126/74 - 149/79)  BP(mean): --  RR: 20 (14 May 2023 05:52) (18 - 20)  SpO2: 94% (14 May 2023 05:52) (91% - 94%)    Parameters below as of 14 May 2023 05:52  Patient On (Oxygen Delivery Method): nasal cannula  O2 Flow (L/min): 2      CONSTITUTIONAL: NAD, well-developed  RESPIRATORY: Normal respiratory effort; lungs are clear to auscultation bilaterally  CARDIOVASCULAR: Regular rate and rhythm, normal S1 and S2, no murmur/rub/gallop; No lower extremity edema; Peripheral pulses are 2+ bilaterally  ABDOMEN: Nontender to palpation, normoactive bowel sounds, no rebound/guarding; No hepatosplenomegaly  MUSCLOSKELETAL: no clubbing or cyanosis of digits; no joint swelling or tenderness to palpation  PSYCH: A+O to person, place, and time; affect appropriate    LABS:    05-13    128<L>  |  90<L>  |  17  ----------------------------<  89  5.1   |  20<L>  |  0.87    Ca    9.2      13 May 2023 05:30  Phos  4.4     05-13  Mg     2.20     05-13                  Tele Reviewed:    RADIOLOGY & ADDITIONAL TESTS:  Results Reviewed:   Imaging Personally Reviewed:  Electrocardiogram Personally Reviewed:     PROGRESS NOTE:   Authored by Dr. Ashleigh Leigh MD (PGY-1). Pager [facility] [phone] / [facility]     Patient is a 57y old  Male who presents with a chief complaint of Gluteal wound (12 May 2023 07:50)    Pt with no complaints, answering questions       SUBJECTIVE / OVERNIGHT EVENTS:  No acute events overnight.     ADDITIONAL REVIEW OF SYSTEMS:  Patient denies fevers, chills, chest pain, shortness of breath, nausea, abdominal pain, diarrhea, constipation, dysuria, leg swelling, headache, light headedness.    MEDICATIONS  (STANDING):  ARIPiprazole 10 milliGRAM(s) Oral daily  atorvastatin 40 milliGRAM(s) Oral at bedtime  budesonide 160 MICROgram(s)/formoterol 4.5 MICROgram(s) Inhaler 2 Puff(s) Inhalation two times a day  cholecalciferol 2000 Unit(s) Oral daily  Dakins Solution - 1/4 Strength 1 Application(s) Topical two times a day  diltiazem    milliGRAM(s) Oral daily  enoxaparin Injectable 40 milliGRAM(s) SubCutaneous every 24 hours  lidocaine   4% Patch 1 Patch Transdermal every 24 hours  losartan 100 milliGRAM(s) Oral daily  sodium chloride 1 Gram(s) Oral two times a day  vancomycin  IVPB 1500 milliGRAM(s) IV Intermittent every 8 hours    MEDICATIONS  (PRN):  acetaminophen     Tablet .. 975 milliGRAM(s) Oral every 6 hours PRN Temp greater or equal to 38C (100.4F), Mild Pain (1 - 3), Moderate Pain (4 - 6), Severe Pain (7 - 10)  albuterol    90 MICROgram(s) HFA Inhaler 2 Puff(s) Inhalation every 6 hours PRN Shortness of Breath and/or Wheezing  OLANZapine 5 milliGRAM(s) Oral every 6 hours PRN Agitation  oxyCODONE    IR 5 milliGRAM(s) Oral every 4 hours PRN Moderate Pain (4 - 6)      CAPILLARY BLOOD GLUCOSE        I&O's Summary    13 May 2023 07:01  -  14 May 2023 07:00  --------------------------------------------------------  IN: 1000 mL / OUT: 0 mL / NET: 1000 mL        PHYSICAL EXAM:  Vital Signs Last 24 Hrs  T(C): 36.8 (14 May 2023 05:52), Max: 36.8 (13 May 2023 15:15)  T(F): 98.2 (14 May 2023 05:52), Max: 98.2 (13 May 2023 15:15)  HR: 97 (14 May 2023 05:52) (79 - 97)  BP: 149/79 (14 May 2023 05:52) (126/74 - 149/79)  BP(mean): --  RR: 20 (14 May 2023 05:52) (18 - 20)  SpO2: 94% (14 May 2023 05:52) (91% - 94%)    Parameters below as of 14 May 2023 05:52  Patient On (Oxygen Delivery Method): nasal cannula  O2 Flow (L/min): 2      CONSTITUTIONAL: NAD, well-developed  RESPIRATORY: Normal respiratory effort; lungs are clear to auscultation bilaterally  CARDIOVASCULAR: Regular rate and rhythm, normal S1 and S2, no murmur/rub/gallop; No lower extremity edema; Peripheral pulses are 2+ bilaterally  ABDOMEN: Nontender to palpation, normoactive bowel sounds, no rebound/guarding; No hepatosplenomegaly  MUSCLOSKELETAL: no clubbing or cyanosis of digits; no joint swelling or tenderness to palpation  PSYCH: A+O to person, place, and time; affect appropriate    LABS:    05-13    128<L>  |  90<L>  |  17  ----------------------------<  89  5.1   |  20<L>  |  0.87    Ca    9.2      13 May 2023 05:30  Phos  4.4     05-13  Mg     2.20     05-13                  Tele Reviewed:    RADIOLOGY & ADDITIONAL TESTS:  Results Reviewed:   Imaging Personally Reviewed:  Electrocardiogram Personally Reviewed:

## 2023-05-14 NOTE — PROGRESS NOTE ADULT - PROBLEM SELECTOR PLAN 1
#Cellulitis c/b Sepsis  Pt with significant right gluteal wound progressive x1 weeks. P/w tachycardia, leukocytsosis c/w sepsis. Has h/o MRSA infection, requiring long course of vancomycin in the past. Historical Derm Bx negative for PG, but if persistent with poor wound healing, may reconsider further evaluation.  hx of left   left gluteal wound requiring debridement  surgery recommends multidisciplinary consults to determine etiology prior to debridement   TTE: ef: 60% unremarkable, endocardium could not be adequately visualized  5/6 BCX: MRSA; 5/8, 5/10 BCx: NGTD   s/p cefepime   UCx negative   - s/p wound care debridement 5/10, f/u path results, will f/u if any interventions needed inpatient  - Derm recs appreciated: not likely Pyoderma Gangrenosum  - c/w vancomycin (5/7-  ) 1500 q12 hours with  AUC between 400-600; will need until 6/5  - f/u ID recs #Cellulitis c/b Sepsis  Pt with significant right gluteal wound progressive x1 weeks. P/w tachycardia, leukocytsosis c/w sepsis. Has h/o MRSA infection, requiring long course of vancomycin in the past. Historical Derm Bx negative for PG, but if persistent with poor wound healing, may reconsider further evaluation.  hx of left   left gluteal wound requiring debridement  surgery recommends multidisciplinary consults to determine etiology prior to debridement   TTE: ef: 60% unremarkable, endocardium could not be adequately visualized  5/6 BCX: MRSA; 5/8, 5/10 BCx: NGTD   s/p cefepime   UCx negative   - s/p wound care debridement 5/10, f/u path results, will f/u if any interventions needed inpatient  - Derm recs appreciated: not likely Pyoderma Gangrenosum  - c/w vancomycin (5/7-  ) 1750 q8 hours with  AUC between 400-600; will need until 6/5  - f/u pre4th dose on 5/14 after 6a dose   - f/u ID recs

## 2023-05-14 NOTE — PROGRESS NOTE ADULT - PROBLEM SELECTOR PLAN 5
Pt with h/o variable hyponatremia. SOsm calc c/w hypotonic hyponatremia. Farrukh low suggestive of sodium-avid state (i.e. RAAS activation), UOsm quite low, though c/f psychogenic polydipsia vs poor solute intake.. Unclear contribution of ARB as OP as pt not filling medications consistently.   likely 2/2 SIADH as improved with fluid restriction   - Trend Na   - c/w fluid restriction to 1L Pt with h/o variable hyponatremia. SOsm calc c/w hypotonic hyponatremia. Farrukh low suggestive of sodium-avid state (i.e. RAAS activation), UOsm quite low, though c/f psychogenic polydipsia vs poor solute intake.. Unclear contribution of ARB as OP as pt not filling medications consistently.   likely 2/2 SIADH as improved with fluid restriction   - Trend Na   - c/w fluid restriction to 1200 and increased to 3 salt tabs

## 2023-05-14 NOTE — PROGRESS NOTE ADULT - PROBLEM SELECTOR PLAN 4
PT with h/o CVID requiring IgG infusions q1mo (last 4/13/2023).  OP Immunologist: Dr. Mitchell Boxer ([facility])  IGg level low at 489   s/p gammagard 75 grams on 5/8. D/w A&I fellow Dr. Kimura on 5/10, no need to re-dose with gammagard S/D at this time. Can resume usual monthly infusions after discharge.    - Allergy and Immunology recs appreciated   -recorded allergy to amoxicillin and penicillin however tolerated augmentin in the past, may re-address allergy if he needs a beta lactem

## 2023-05-14 NOTE — PROGRESS NOTE ADULT - PROBLEM SELECTOR PLAN 10
Diet: CC  DVT: lovenox  Dispo: Unable to give IV antibiotics at [facility], may need to stay inpatient until 6/5 for IV abx unless recommendation for inpatient psychiatry changes

## 2023-05-14 NOTE — PROGRESS NOTE ADULT - ATTENDING COMMENTS
57M with MRSA bacteremia secondary to gluteal wound. cont IV vanco. clinically stable. agree with care plan as above.

## 2023-05-15 LAB
ANION GAP SERPL CALC-SCNC: 15 MMOL/L — HIGH (ref 7–14)
BUN SERPL-MCNC: 13 MG/DL — SIGNIFICANT CHANGE UP (ref 7–23)
CALCIUM SERPL-MCNC: 9.4 MG/DL — SIGNIFICANT CHANGE UP (ref 8.4–10.5)
CHLORIDE SERPL-SCNC: 87 MMOL/L — LOW (ref 98–107)
CO2 SERPL-SCNC: 24 MMOL/L — SIGNIFICANT CHANGE UP (ref 22–31)
CREAT SERPL-MCNC: 0.83 MG/DL — SIGNIFICANT CHANGE UP (ref 0.5–1.3)
CULTURE RESULTS: SIGNIFICANT CHANGE UP
CULTURE RESULTS: SIGNIFICANT CHANGE UP
EGFR: 103 ML/MIN/1.73M2 — SIGNIFICANT CHANGE UP
GLUCOSE SERPL-MCNC: 123 MG/DL — HIGH (ref 70–99)
HCT VFR BLD CALC: 38.2 % — LOW (ref 39–50)
HGB BLD-MCNC: 12.4 G/DL — LOW (ref 13–17)
MAGNESIUM SERPL-MCNC: 2.2 MG/DL — SIGNIFICANT CHANGE UP (ref 1.6–2.6)
MCHC RBC-ENTMCNC: 26.3 PG — LOW (ref 27–34)
MCHC RBC-ENTMCNC: 32.5 GM/DL — SIGNIFICANT CHANGE UP (ref 32–36)
MCV RBC AUTO: 81.1 FL — SIGNIFICANT CHANGE UP (ref 80–100)
NRBC # BLD: 0 /100 WBCS — SIGNIFICANT CHANGE UP (ref 0–0)
NRBC # FLD: 0 K/UL — SIGNIFICANT CHANGE UP (ref 0–0)
PHOSPHATE SERPL-MCNC: 4.1 MG/DL — SIGNIFICANT CHANGE UP (ref 2.5–4.5)
PLATELET # BLD AUTO: 264 K/UL — SIGNIFICANT CHANGE UP (ref 150–400)
POTASSIUM SERPL-MCNC: 5.3 MMOL/L — SIGNIFICANT CHANGE UP (ref 3.5–5.3)
POTASSIUM SERPL-SCNC: 5.3 MMOL/L — SIGNIFICANT CHANGE UP (ref 3.5–5.3)
RBC # BLD: 4.71 M/UL — SIGNIFICANT CHANGE UP (ref 4.2–5.8)
RBC # FLD: 15.9 % — HIGH (ref 10.3–14.5)
SODIUM SERPL-SCNC: 126 MMOL/L — LOW (ref 135–145)
[facility] SOURCE: SIGNIFICANT CHANGE UP
[facility] SOURCE: SIGNIFICANT CHANGE UP
VANCOMYCIN TROUGH SERPL-MCNC: 11.5 UG/ML — SIGNIFICANT CHANGE UP (ref 10–20)
WBC # BLD: 10.01 K/UL — SIGNIFICANT CHANGE UP (ref 3.8–10.5)
WBC # FLD AUTO: 10.01 K/UL — SIGNIFICANT CHANGE UP (ref 3.8–10.5)

## 2023-05-15 PROCEDURE — 99232 SBSQ HOSP IP/OBS MODERATE 35: CPT | Mod: GC

## 2023-05-15 PROCEDURE — 99232 SBSQ HOSP IP/OBS MODERATE 35: CPT

## 2023-05-15 RX ORDER — VANCOMYCIN HCL 1 G
1250 VIAL (EA) INTRAVENOUS EVERY 8 HOURS
Refills: 0 | Status: DISCONTINUED | OUTPATIENT
Start: 2023-05-15 | End: 2023-05-16

## 2023-05-15 RX ORDER — VANCOMYCIN HCL 1 G
2000 VIAL (EA) INTRAVENOUS EVERY 8 HOURS
Refills: 0 | Status: DISCONTINUED | OUTPATIENT
Start: 2023-05-15 | End: 2023-05-15

## 2023-05-15 RX ADMIN — LOSARTAN POTASSIUM 100 MILLIGRAM(S): 100 TABLET, FILM COATED ORAL at 05:49

## 2023-05-15 RX ADMIN — Medication 2000 UNIT(S): at 12:18

## 2023-05-15 RX ADMIN — SODIUM CHLORIDE 1 GRAM(S): 9 INJECTION INTRAMUSCULAR; INTRAVENOUS; SUBCUTANEOUS at 13:19

## 2023-05-15 RX ADMIN — Medication 1 APPLICATION(S): at 05:49

## 2023-05-15 RX ADMIN — ATORVASTATIN CALCIUM 40 MILLIGRAM(S): 80 TABLET, FILM COATED ORAL at 21:31

## 2023-05-15 RX ADMIN — SODIUM CHLORIDE 1 GRAM(S): 9 INJECTION INTRAMUSCULAR; INTRAVENOUS; SUBCUTANEOUS at 05:49

## 2023-05-15 RX ADMIN — Medication 1 APPLICATION(S): at 17:50

## 2023-05-15 RX ADMIN — ENOXAPARIN SODIUM 40 MILLIGRAM(S): 100 INJECTION SUBCUTANEOUS at 12:18

## 2023-05-15 RX ADMIN — Medication 300 MILLIGRAM(S): at 05:49

## 2023-05-15 RX ADMIN — Medication 166.67 MILLIGRAM(S): at 22:17

## 2023-05-15 RX ADMIN — ARIPIPRAZOLE 10 MILLIGRAM(S): 15 TABLET ORAL at 12:18

## 2023-05-15 RX ADMIN — SODIUM CHLORIDE 1 GRAM(S): 9 INJECTION INTRAMUSCULAR; INTRAVENOUS; SUBCUTANEOUS at 21:31

## 2023-05-15 RX ADMIN — BUDESONIDE AND FORMOTEROL FUMARATE DIHYDRATE 2 PUFF(S): 160; 4.5 AEROSOL RESPIRATORY (INHALATION) at 09:44

## 2023-05-15 RX ADMIN — Medication 250 MILLIGRAM(S): at 09:44

## 2023-05-15 NOTE — PROGRESS NOTE ADULT - PROBLEM SELECTOR PLAN 4
PT with h/o CVID requiring IgG infusions q1mo (last 4/13/2023).  OP Immunologist: Dr. Mitchell Boxer ([facility])  IGg level low at 489   s/p gammagard 75 grams on 5/8. D/w A&I fellow Dr. Kimura on 5/10, no need to re-dose with gammagard S/D at this time. Can resume usual monthly infusions after discharge.    - Allergy and Immunology recs appreciated   -recorded allergy to amoxicillin and penicillin however tolerated augmentin in the past, may re-address allergy if he needs a beta lactem

## 2023-05-15 NOTE — PROGRESS NOTE ADULT - PROBLEM SELECTOR PLAN 10
Diet: CC  DVT: lovenox  Dispo: Unable to give IV antibiotics at [facility], may need to stay inpatient until 6/5 for IV abx unless recommendation for inpatient psychiatry changes

## 2023-05-15 NOTE — PROGRESS NOTE ADULT - PROBLEM SELECTOR PLAN 5
Pt with h/o variable hyponatremia. SOsm calc c/w hypotonic hyponatremia. Farrukh low suggestive of sodium-avid state (i.e. RAAS activation), UOsm quite low, though c/f psychogenic polydipsia vs poor solute intake.. Unclear contribution of ARB as OP as pt not filling medications consistently.   likely 2/2 SIADH as improved with fluid restriction   - Trend Na   - c/w fluid restriction to 1200 and increased to 3 salt tabs

## 2023-05-15 NOTE — BH CONSULTATION LIAISON PROGRESS NOTE - NSBHASSESSMENTFT_PSY_ALL_CORE
56M, domiciled in supportive housing TSI, SSD, single, PMH HTN, DM, hypogammaglobulinemia, PPHx schizoaffective d/o, bipolar d/o, hx of multiple psych hospitalizations (last known in 2020 at [facility] [place]), denies hx SA/NSSIB, denies drug or alcohol use, denies legal hx, who presented with right gluteal wound, admitted to medicine for cellulutis. Psychiatry consulted for psychosis/schizophrenia.    On assessment, patient is calm and cooperative. AAO3. No overt symptoms of psychosis, depression, or yo. No S/I or H/I. Patient w/ recurrent/chronic infections. Patient has left multiple hospitals AMA while undergoing treatment for cellulitis. However, patient recognizes benefit of continuing w/ antibiotic treatment, as well as risks of discontinuing treatment. Patient not currently refusing treatment or attempting to leave AMA. Patient interested in reconnection to outpatient care after discharge. Patient was due for Haldol decanoate 150mg on 4/16. Will obtain collateral see when last received.     5/9: no interval change. no overt positive symptoms of psychosis. patient w/ likely negative/cognitive symptoms that impair ability to adequately care for self (e.g. likely contribute to poor hygiene, recurrent infections, etc.). no si or hi. interested in resuming outpatient care and restarting psychotropics. recommend aripiprazole 5mg qd for psychosis/mood (w/ reported history of depression).     5/10: status quo. no psychosis or depression. patient w/ impaired ability to care for self, likely a result of negative/cognitive symptoms. per chart review, patient has not seen outpt psychiatrist since 9/22. cannot leave AMA. will consider inpatient psych once medically cleared.     5/11: feels physically better. mood improved. no overt positive psychotic symptoms. can increase aripiprazole to 10mg qd.     5/12: minimal interval change. pleasant, calm, cooperative, likely cognitively limited due to chronic SMI. c/w aripiprazole 10mg    5/13: status quo. no mood or psychotic sx. in good behavioral control. can increase aripiprazole to 15mg.      Plan:  - Routine observation, no psychiatric indication for CO 1:1  [ ] INCREASE aripiprazole to 15mg qd for psychosis/mood  - If patient refuses treatment or attempts to leave AMA, psych can reassess for capacity   - Zyprexa 2.5-5mg PO/IM/IV q6hrs PRN for agitation  - CANNOT LEAVE AMA  - Dispo: patient requires PICC until 6/5, can consider transfer to [facility]S for completion of abx tx and optimization of psychiatric regimen

## 2023-05-15 NOTE — PROGRESS NOTE ADULT - ASSESSMENT
57 m with bipolar, schizophrenia, HTN,  bronchiectasis, CVID, hypogammaglobulinemia (monthly IVIG), previous LLE cellulitis/abscess and MRSA bacteremia, went to [facility] [place] for R buttock wound, cellulitis but signed out AMA and was given clinda now p/w worsening pain/edema  here afebrile, WBC: 15  abd/pelvis CT: buttock cellulitis, no abscess seen  blood cx: MRSA, repeat negative 5/8  s/p wound debridement by wound care 5/10, had Necrotic dermis and subcutaneous fatty tissue Into subcutaneous tissues, cx also with MRSA  TTE unable to exclude endocarditis    leukocytosis, MRSA bacteremia due to buttock cellulitis and abscess in the setting of CVID, on monthly IVIG and previous MRSA bacteremia with abscess  repeat blood cx negative 5/8, TTE unable to exclude endocarditis   s/p I&D of necrotic skin and fatty tissue 5/10, cx with MRSA as well    Recommend:  * Decrease vanco to 1250 mg IV q 8 hours (based on AUC/HATTIE calculation)   * monitor renal function to prevent nephrotoxicity  * f/u with wound care  * will need a 4 week course of vanco post negative blood cx through 6/5  * weekly CBC, CMP, vanco trough while on antibiotics     Jose Faye MD  Available through MS Teams  If no response, or after 5pm/weekends, call [phone]

## 2023-05-15 NOTE — PROGRESS NOTE ADULT - SUBJECTIVE AND OBJECTIVE BOX
CC: Patient is a 57y old  Male who presents with a chief complaint of Gluteal wound (12 May 2023 07:50)    ID following for MRSA bacteremia, gluteal abscess    Interval History/ROS: Patient has no new complaints. No fevers, no chills.    Rest of ROS negative.    Allergies  amoxicillin (Fever)  penicillin (Rash)    ANTIMICROBIALS:      OTHER MEDS:  acetaminophen     Tablet .. 975 milliGRAM(s) Oral every 6 hours PRN  albuterol    90 MICROgram(s) HFA Inhaler 2 Puff(s) Inhalation every 6 hours PRN  ARIPiprazole 10 milliGRAM(s) Oral daily  atorvastatin 40 milliGRAM(s) Oral at bedtime  budesonide 160 MICROgram(s)/formoterol 4.5 MICROgram(s) Inhaler 2 Puff(s) Inhalation two times a day  cholecalciferol 2000 Unit(s) Oral daily  Dakins Solution - 1/4 Strength 1 Application(s) Topical two times a day  diltiazem    milliGRAM(s) Oral daily  enoxaparin Injectable 40 milliGRAM(s) SubCutaneous every 24 hours  lidocaine   4% Patch 1 Patch Transdermal every 24 hours  losartan 100 milliGRAM(s) Oral daily  OLANZapine 5 milliGRAM(s) Oral every 6 hours PRN  sodium chloride 1 Gram(s) Oral three times a day    PE:    Vital Signs Last 24 Hrs  T(C): 36.4 (15 May 2023 05:40), Max: 37 (14 May 2023 22:00)  T(F): 97.5 (15 May 2023 05:40), Max: 98.6 (14 May 2023 22:00)  HR: 84 (15 May 2023 13:22) (80 - 84)  BP: 144/82 (15 May 2023 13:22) (144/82 - 152/84)  BP(mean): --  RR: 17 (15 May 2023 13:22) (17 - 19)  SpO2: 92% (15 May 2023 13:22) (92% - 95%)    Parameters below as of 15 May 2023 13:22  Patient On (Oxygen Delivery Method): room air    Gen: AOx3, NAD  CV: S1+S2 normal, no murmurs  Resp: Clear bilat, no resp distress  Abd: Soft, nontender, +BS  Ext: No LE edema, no wounds  : No Perez  IV/Skin: No thrombophlebitis, R buttock wound without purulent drainage  Neuro: no focal deficits    LABS:        05-14    127<L>  |  89<L>  |  13  ----------------------------<  96  4.4   |  26  |  0.85    Ca    9.0      14 May 2023 06:17  Phos  4.2     05-14  Mg     2.10     05-14            MICROBIOLOGY:  Vancomycin Level, Trough: 11.5 ug/mL (05-15-23 @ 07:00)  v  .Blood Blood-Peripheral  05-10-23   No growth to date.  --  --      .Blood Blood-Peripheral  05-10-23   No Growth Final  --  --      .Abscess right buttock  05-09-23   Moderate Methicillin Resistant Staphylococcus aureus  --  Methicillin resistant Staphylococcus aureus      .Blood Blood-Peripheral  05-08-23   No Growth Final  --  --      .Blood Blood-Peripheral  05-08-23   No Growth Final  --  --      Clean Catch Clean Catch (Midstream)  05-06-23   No growth  --  --      .Blood Blood-Peripheral  05-06-23   Growth in aerobic bottle: Methicillin Resistant Staphylococcus aureus  ***Blood Panel PCR results on this specimen are available  approximately 3 hours after the Gram stain result.***  Gram stain, PCR, and/or culture results may not always  correspond due to difference in methodologies.  ************************************************************  This PCR assay was performed by multiplex PCR. This  Assay tests for 66 bacterial and resistance gene targets.  Please refer to the [facility] [employer] test directory  at [email or web address] for details.  --  Blood Culture PCR  Methicillin resistant Staphylococcus aureus      .Blood Blood-Peripheral  05-06-23   No Growth Final  --  --    RADIOLOGY:    < from: CT Pelvis w/ IV Cont (05.06.23 @ 21:22) >  IMPRESSION:  Moderate subcutaneous inflammatory change and skin thickening in the   right buttocks suggestive of a cellulitis. No associated rim-enhancing   fluid collection to suggest an abscess. No subcutaneous gas.    < end of copied text >

## 2023-05-15 NOTE — PROGRESS NOTE ADULT - PROBLEM SELECTOR PLAN 1
#Cellulitis c/b Sepsis  Pt with significant right gluteal wound progressive x1 weeks. P/w tachycardia, leukocytsosis c/w sepsis. Has h/o MRSA infection, requiring long course of vancomycin in the past. Historical Derm Bx negative for PG, but if persistent with poor wound healing, may reconsider further evaluation.  hx of left   left gluteal wound requiring debridement  surgery recommends multidisciplinary consults to determine etiology prior to debridement   TTE: ef: 60% unremarkable, endocardium could not be adequately visualized  5/6 BCX: MRSA; 5/8, 5/10 BCx: NGTD   s/p cefepime   UCx negative   - s/p wound care debridement 5/10, f/u path results, will f/u if any interventions needed inpatient  - Derm recs appreciated: not likely Pyoderma Gangrenosum  - c/w vancomycin (5/7-  ) 1750 q8 hours with  AUC between 400-600; will need until 6/5  - f/u pre4th dose on 5/14 after 6a dose   - f/u ID recs

## 2023-05-15 NOTE — BH CONSULTATION LIAISON PROGRESS NOTE - NSBHATTESTCOMMENTATTENDFT_PSY_A_CORE
Chart reviewed. Pt seen with resident. Presents as calm, cooperative, oriented, without overt psychosis. Understands need for psych and antibiotic optimization, and is even open to a long acting injectable antipsychotic. His psychosis needs full optimization before departing this hospital setting for either rehab (if still needing IV ABX) or home (if already done with said IV ABX). Will continue to follow.

## 2023-05-15 NOTE — PROGRESS NOTE ADULT - ATTENDING COMMENTS
57M with PMH of schizoaffective d/o, CVID, HTN, DM2, hx of MRSA bacteremia likely in setting of LLE cellulitis who presents to the hospital w/ R gluteal drainage concerning for cellulitis abscess. Currently on empiric abx. Surgery on board. ID/derm/allergy, BH, immunology on board as well. For further management. Infectious workup now revealing MRSA bacteremia, likely related to his buttock abscess. Clinically stable on IV abx. Pending further wound care/improvement. Challenging dispo as inpt psych cannot accept pt w/ PICC line. Pt is pending further psych optimization prior to clearance from DC from hospital.    Pt seen at bedside. No pain. Feels well. Buttock wound evaluated – less drainage, less erythema. Improving. AM labs pending.    #R gluteal abscess/cellulitis c/b MRSA bacteremia  #Chronic hyponatremia  #Schizoaffective d/o  #CVID  #HTN  #DM2    -Continue IV Vanco - dose by AUC - appreciate pharm assistance. Appreciate ID recs.  -Cont salt tabs. Trend Na. If not improved - discuss with pharmacy use NS with vanco, if possible.  -CANNOT AMA - Dispo planning w/ BH  - Inpt psych cannot take pt with PICC. No oral option given MRSA bacteremia and potential interaction with his oral psych meds. Discussed w/ ID. Pending further psych optimization and psych clearance for dispo.    Rest of plan as above.

## 2023-05-15 NOTE — PROGRESS NOTE ADULT - SUBJECTIVE AND OBJECTIVE BOX
Augusto Gonzalez MD  Internal Medicine, PGY-2  Please Contact via TEAMS    SUBJECTIVE / OVERNIGHT EVENTS:  - Pt seen and examined at bedside  - NORMAN    MEDICATIONS  (STANDING):  ARIPiprazole 10 milliGRAM(s) Oral daily  atorvastatin 40 milliGRAM(s) Oral at bedtime  budesonide 160 MICROgram(s)/formoterol 4.5 MICROgram(s) Inhaler 2 Puff(s) Inhalation two times a day  cholecalciferol 2000 Unit(s) Oral daily  Dakins Solution - 1/4 Strength 1 Application(s) Topical two times a day  diltiazem    milliGRAM(s) Oral daily  enoxaparin Injectable 40 milliGRAM(s) SubCutaneous every 24 hours  lidocaine   4% Patch 1 Patch Transdermal every 24 hours  losartan 100 milliGRAM(s) Oral daily  sodium chloride 1 Gram(s) Oral three times a day  vancomycin  IVPB 1750 milliGRAM(s) IV Intermittent every 8 hours    MEDICATIONS  (PRN):  acetaminophen     Tablet .. 975 milliGRAM(s) Oral every 6 hours PRN Temp greater or equal to 38C (100.4F), Mild Pain (1 - 3), Moderate Pain (4 - 6), Severe Pain (7 - 10)  albuterol    90 MICROgram(s) HFA Inhaler 2 Puff(s) Inhalation every 6 hours PRN Shortness of Breath and/or Wheezing  OLANZapine 5 milliGRAM(s) Oral every 6 hours PRN Agitation        05-14-23 @ 07:01  -  05-15-23 @ 07:00  --------------------------------------------------------  IN: 250 mL / OUT: 2250 mL / NET: -2000 mL        PHYSICAL EXAM:  Vital Signs Last 24 Hrs  T(C): 36.4 (15 May 2023 05:40), Max: 37 (14 May 2023 22:00)  T(F): 97.5 (15 May 2023 05:40), Max: 98.6 (14 May 2023 22:00)  HR: 80 (15 May 2023 05:40) (80 - 81)  BP: 152/84 (15 May 2023 05:40) (145/57 - 152/84)  BP(mean): --  RR: 18 (15 May 2023 05:40) (18 - 20)  SpO2: 95% (15 May 2023 05:40) (91% - 95%)    Parameters below as of 15 May 2023 05:40  Patient On (Oxygen Delivery Method): nasal cannula  O2 Flow (L/min): 2      CAPILLARY BLOOD GLUCOSE        I&O's Summary    14 May 2023 07:01  -  15 May 2023 07:00  --------------------------------------------------------  IN: 250 mL / OUT: 2250 mL / NET: -2000 mL        CONSTITUTIONAL: NAD, well-developed  RESPIRATORY: Normal respiratory effort; lungs are clear to auscultation bilaterally  CARDIOVASCULAR: Regular rate and rhythm, normal S1 and S2, no murmur/rub/gallop; No lower extremity edema; Peripheral pulses are 2+ bilaterally  ABDOMEN: Nontender to palpation, normoactive bowel sounds, no rebound/guarding; No hepatosplenomegaly  MUSCLOSKELETAL: no clubbing or cyanosis of digits; no joint swelling or tenderness to palpation  PSYCH: A+O to person, place, and time; affect appropriate    LABS:    05-14    127<L>  |  89<L>  |  13  ----------------------------<  96  4.4   |  26  |  0.85    Ca    9.0      14 May 2023 06:17  Phos  4.2     05-14  Mg     2.10     05-14                  IMAGING:    [X] All pertinent imaging reviewed by me

## 2023-05-16 LAB
ANION GAP SERPL CALC-SCNC: 11 MMOL/L — SIGNIFICANT CHANGE UP (ref 7–14)
BUN SERPL-MCNC: 12 MG/DL — SIGNIFICANT CHANGE UP (ref 7–23)
CALCIUM SERPL-MCNC: 9.5 MG/DL — SIGNIFICANT CHANGE UP (ref 8.4–10.5)
CHLORIDE SERPL-SCNC: 89 MMOL/L — LOW (ref 98–107)
CO2 SERPL-SCNC: 28 MMOL/L — SIGNIFICANT CHANGE UP (ref 22–31)
CREAT SERPL-MCNC: 0.75 MG/DL — SIGNIFICANT CHANGE UP (ref 0.5–1.3)
EGFR: 107 ML/MIN/1.73M2 — SIGNIFICANT CHANGE UP
GLUCOSE BLDC GLUCOMTR-MCNC: 119 MG/DL — HIGH (ref 70–99)
GLUCOSE SERPL-MCNC: 127 MG/DL — HIGH (ref 70–99)
MAGNESIUM SERPL-MCNC: 2.1 MG/DL — SIGNIFICANT CHANGE UP (ref 1.6–2.6)
PHOSPHATE SERPL-MCNC: 4.1 MG/DL — SIGNIFICANT CHANGE UP (ref 2.5–4.5)
POTASSIUM SERPL-MCNC: 4.4 MMOL/L — SIGNIFICANT CHANGE UP (ref 3.5–5.3)
POTASSIUM SERPL-SCNC: 4.4 MMOL/L — SIGNIFICANT CHANGE UP (ref 3.5–5.3)
SODIUM SERPL-SCNC: 128 MMOL/L — LOW (ref 135–145)

## 2023-05-16 PROCEDURE — 99232 SBSQ HOSP IP/OBS MODERATE 35: CPT | Mod: GC

## 2023-05-16 PROCEDURE — 99232 SBSQ HOSP IP/OBS MODERATE 35: CPT

## 2023-05-16 RX ORDER — VANCOMYCIN HCL 1 G
1250 VIAL (EA) INTRAVENOUS EVERY 8 HOURS
Refills: 0 | Status: DISCONTINUED | OUTPATIENT
Start: 2023-05-16 | End: 2023-05-20

## 2023-05-16 RX ORDER — ARIPIPRAZOLE 15 MG/1
15 TABLET ORAL DAILY
Refills: 0 | Status: DISCONTINUED | OUTPATIENT
Start: 2023-05-16 | End: 2023-06-06

## 2023-05-16 RX ADMIN — Medication 166.67 MILLIGRAM(S): at 13:40

## 2023-05-16 RX ADMIN — SODIUM CHLORIDE 1 GRAM(S): 9 INJECTION INTRAMUSCULAR; INTRAVENOUS; SUBCUTANEOUS at 13:39

## 2023-05-16 RX ADMIN — ATORVASTATIN CALCIUM 40 MILLIGRAM(S): 80 TABLET, FILM COATED ORAL at 22:33

## 2023-05-16 RX ADMIN — Medication 166.67 MILLIGRAM(S): at 22:33

## 2023-05-16 RX ADMIN — Medication 1 APPLICATION(S): at 18:00

## 2023-05-16 RX ADMIN — Medication 1 APPLICATION(S): at 06:14

## 2023-05-16 RX ADMIN — ARIPIPRAZOLE 15 MILLIGRAM(S): 15 TABLET ORAL at 12:59

## 2023-05-16 RX ADMIN — SODIUM CHLORIDE 1 GRAM(S): 9 INJECTION INTRAMUSCULAR; INTRAVENOUS; SUBCUTANEOUS at 05:41

## 2023-05-16 RX ADMIN — SODIUM CHLORIDE 1 GRAM(S): 9 INJECTION INTRAMUSCULAR; INTRAVENOUS; SUBCUTANEOUS at 22:33

## 2023-05-16 RX ADMIN — LOSARTAN POTASSIUM 100 MILLIGRAM(S): 100 TABLET, FILM COATED ORAL at 05:41

## 2023-05-16 RX ADMIN — Medication 166.67 MILLIGRAM(S): at 05:41

## 2023-05-16 RX ADMIN — ENOXAPARIN SODIUM 40 MILLIGRAM(S): 100 INJECTION SUBCUTANEOUS at 12:18

## 2023-05-16 RX ADMIN — Medication 2000 UNIT(S): at 12:18

## 2023-05-16 RX ADMIN — Medication 300 MILLIGRAM(S): at 05:41

## 2023-05-16 NOTE — PROGRESS NOTE ADULT - PROBLEM SELECTOR PLAN 4
PT with h/o CVID requiring IgG infusions q1mo (last 4/13/2023).  OP Immunologist: Dr. Mitchell Boxer ([facility])  IGg level low at 489   s/p gammagard 75 grams on 5/8. D/w A&I fellow Dr. Kimura on 5/10, no need to re-dose with gammagard S/D at this time. Can resume usual monthly infusions after discharge.    - Allergy and Immunology recs appreciated   -recorded allergy to amoxicillin and penicillin however tolerated augmentin in the past, may re-address allergy if he needs a beta lactem

## 2023-05-16 NOTE — PROGRESS NOTE ADULT - ASSESSMENT
57 m with bipolar, schizophrenia, HTN,  bronchiectasis, CVID, hypogammaglobulinemia (monthly IVIG), previous LLE cellulitis/abscess and MRSA bacteremia, went to [facility] for R buttock wound, cellulitis but signed out AMA and was given clinda now p/w worsening pain/edema  here afebrile, WBC: 15  abd/pelvis CT: buttock cellulitis, no abscess seen  blood cx: MRSA, repeat negative 5/8  s/p wound debridement by wound care 5/10, had Necrotic dermis and subcutaneous fatty tissue Into subcutaneous tissues, cx also with MRSA  TTE unable to exclude endocarditis    leukocytosis, MRSA bacteremia due to buttock cellulitis and abscess in the setting of CVID, on monthly IVIG and previous MRSA bacteremia with abscess  repeat blood cx negative 5/8, TTE unable to exclude endocarditis   s/p I&D of necrotic skin and fatty tissue 5/10, cx with MRSA as well    Recommend:  * Continue vanco to 1250 mg IV q 8 hours (based on AUC/HATTIE calculation)   * monitor renal function to prevent nephrotoxicity  * f/u with wound care  * will need a 4 week course of vanco post negative blood cx through 6/5  * weekly CBC, CMP, vanco trough while on antibiotics     Jose Faye MD  Available through MS Teams  If no response, or after 5pm/weekends, call [phone]

## 2023-05-16 NOTE — PROGRESS NOTE ADULT - PROBLEM SELECTOR PLAN 1
#Cellulitis c/b Sepsis  Pt with significant right gluteal wound progressive x1 weeks. P/w tachycardia, leukocytsosis c/w sepsis. Has h/o MRSA infection, requiring long course of vancomycin in the past. Historical Derm Bx negative for PG, but if persistent with poor wound healing, may reconsider further evaluation.  hx of left   left gluteal wound requiring debridement  surgery recommends multidisciplinary consults to determine etiology prior to debridement   TTE: ef: 60% unremarkable, endocardium could not be adequately visualized  5/6 BCX: MRSA; 5/8, 5/10 BCx: NGTD   s/p cefepime   UCx negative   - s/p wound care debridement 5/10, f/u path results, will f/u if any interventions needed inpatient  - Derm recs appreciated: not likely Pyoderma Gangrenosum  - c/w vancomycin (5/7-  ) 1250 q8 hours with  AUC between 400-600; will need until 6/5  - f/u vanc trough   - f/u ID recs

## 2023-05-16 NOTE — PROGRESS NOTE ADULT - SUBJECTIVE AND OBJECTIVE BOX
[facility]-- WOUND TEAM -- FOLLOW UP NOTE  --------------------------------------------------------------------------------    subjective: Patient seen and examined at bedside.     Interval HPI/24 hour events:   Chart reviewed including labs and relevant images      Diet:  Diet, Consistent Carbohydrate w/Evening Snack:   1000mL Fluid Restriction ([id number]) (05-15-23 @ 11:05)      ROS: General/ SKIN/ see HPI  all other systems negative  pt unable to offer    ALLERGIES & MEDICATIONS  --------------------------------------------------------------------------------  Allergies    amoxicillin (Fever)  penicillin (Rash)    Intolerances          STANDING INPATIENT MEDICATIONS    ARIPiprazole 15 milliGRAM(s) Oral daily  atorvastatin 40 milliGRAM(s) Oral at bedtime  budesonide 160 MICROgram(s)/formoterol 4.5 MICROgram(s) Inhaler 2 Puff(s) Inhalation two times a day  cholecalciferol 2000 Unit(s) Oral daily  Dakins Solution - 1/4 Strength 1 Application(s) Topical two times a day  diltiazem    milliGRAM(s) Oral daily  enoxaparin Injectable 40 milliGRAM(s) SubCutaneous every 24 hours  lidocaine   4% Patch 1 Patch Transdermal every 24 hours  losartan 100 milliGRAM(s) Oral daily  sodium chloride 1 Gram(s) Oral three times a day  vancomycin  IVPB 1250 milliGRAM(s) IV Intermittent every 8 hours      PRN INPATIENT MEDICATION  acetaminophen     Tablet .. 975 milliGRAM(s) Oral every 6 hours PRN  albuterol    90 MICROgram(s) HFA Inhaler 2 Puff(s) Inhalation every 6 hours PRN  OLANZapine 5 milliGRAM(s) Oral every 6 hours PRN        Vital signs:  T(C): 36.5 (05-16-23 @ 05:34), Max: 37 (05-15-23 @ 17:26)  HR: 90 (05-16-23 @ 05:34) (62 - 90)  BP: 157/86 (05-16-23 @ 05:34) (144/82 - 157/86)  RR: 18 (05-16-23 @ 05:34) (17 - 19)  SpO2: 88% (05-16-23 @ 05:34) (88% - 92%)  Wt(kg): --        05-15-23 @ 07:01  -  05-16-23 @ 07:00  --------------------------------------------------------  IN: 400 mL / OUT: 3150 mL / NET: -2750 mL    05-16-23 @ 07:01  -  05-16-23 @ 11:27  --------------------------------------------------------  IN: 250 mL / OUT: 375 mL / NET: -125 mL          LABS/ CULTURES/ RADIOLOGY:              12.4   10.01 >-----------<  264      [05-15-23 @ 21:24]              38.2     128  |  89  |  12  ----------------------------<  127      [05-16-23 @ 06:13]  4.4   |  28  |  0.75        Ca     9.5     [05-16-23 @ 06:13]      Mg     2.10     [05-16-23 @ 06:13]      Phos  4.1     [05-16-23 @ 06:13]      PHYSICAL EXAM  Constitutional: Patient received sitting in the chair, transfer back to bed for skin assessment. On contact precautions    GEN: NAD, alert and oriented x 3  HEENT: WNL  CHEST: Symmetrical chest rise, no increased work of breathing, no stridor.  HEART: Rate regular   ABD: Obese, soft, non-distended, non-tender.   EXT: No erythema/edema. Warm, sensate. Up in room independently. Turn in bed independently    INTEGUMENT/WOUND(S):  R Buttock with mild induration and blanching erythema entire affected area measuring. Non tender, no crepitus, fluctuance.   Prev 6 x 8.5 x 2.2 cm.  Wound edge appears erosive and irregular, with multiple immediately adjacent satellite ulcerations exposing dry yellow fibrinous tissue.       - Small nodular lesion at 9 o'clock away from main wound.  measuring 1 x 1.5 cm. Lesion is soft, without ulceration.   - No crepitus/bullae  L Lateral lower leg gaiter Region-1.5cmx1.5cmx0.1cm, 2 x 1.5 x 0.1 cm. Clean, scant exudate. Base is healthy granulation tissue.   - Hyperkeratosis and hyperpigmentation of surrounding skin.   - No erythema, induration, suppuration, malodor, crepitus or bullae present     - Left buttock well-healed I&D site, non draining scab measuring less than 0.5cm.       FULL NOTE TO FOLLOW    CAPILLARY BLOOD GLUCOSE      POCT Blood Glucose.: 119 mg/dL (16 May 2023 09:12)            Vitamin D, 25-Hydroxy: 30.3 ng/mL (05-08-23 @ 05:55)        Culture - Blood (collected 05-10-23 @ 15:21)  Source: .Blood Blood-Peripheral  Final Report (05-15-23 @ 22:00):    No Growth Final    Culture - Blood (collected 05-10-23 @ 07:30)  Source: .Blood Blood-Peripheral  Final Report (05-15-23 @ 11:00):    No Growth Final          A1C with Estimated Average Glucose Result: 6.3 % (05-08-23 @ 05:55)  A1C with Estimated Average Glucose Result: 6.3 % (02-05-23 @ 10:43)                 [facility]-- WOUND TEAM -- FOLLOW UP NOTE  --------------------------------------------------------------------------------    subjective: Patient seen and examined at bedside. Patient endorses nausea. Denies ABD pain or diarrhea. Reports given nausea his appetite has decrease. Otherwise no complaints. Endorses he walks in the room and turn side to side independently Also reports he sits in the chair for less than 2 hours, only sits in the chair for meals.      Interval HPI/24 hour events:   Chart reviewed including labs and relevant images      Diet:  Diet, Consistent Carbohydrate w/Evening Snack:   1000mL Fluid Restriction ([id number]) (05-15-23 @ 11:05)      ROS: General/ SKIN/ see HPI  all other systems negative  pt unable to offer    ALLERGIES & MEDICATIONS  --------------------------------------------------------------------------------  Allergies    amoxicillin (Fever)  penicillin (Rash)    Intolerances          STANDING INPATIENT MEDICATIONS    ARIPiprazole 15 milliGRAM(s) Oral daily  atorvastatin 40 milliGRAM(s) Oral at bedtime  budesonide 160 MICROgram(s)/formoterol 4.5 MICROgram(s) Inhaler 2 Puff(s) Inhalation two times a day  cholecalciferol 2000 Unit(s) Oral daily  Dakins Solution - 1/4 Strength 1 Application(s) Topical two times a day  diltiazem    milliGRAM(s) Oral daily  enoxaparin Injectable 40 milliGRAM(s) SubCutaneous every 24 hours  lidocaine   4% Patch 1 Patch Transdermal every 24 hours  losartan 100 milliGRAM(s) Oral daily  sodium chloride 1 Gram(s) Oral three times a day  vancomycin  IVPB 1250 milliGRAM(s) IV Intermittent every 8 hours      PRN INPATIENT MEDICATION  acetaminophen     Tablet .. 975 milliGRAM(s) Oral every 6 hours PRN  albuterol    90 MICROgram(s) HFA Inhaler 2 Puff(s) Inhalation every 6 hours PRN  OLANZapine 5 milliGRAM(s) Oral every 6 hours PRN        Vital signs:  T(C): 36.5 (05-16-23 @ 05:34), Max: 37 (05-15-23 @ 17:26)  HR: 90 (05-16-23 @ 05:34) (62 - 90)  BP: 157/86 (05-16-23 @ 05:34) (144/82 - 157/86)  RR: 18 (05-16-23 @ 05:34) (17 - 19)  SpO2: 88% (05-16-23 @ 05:34) (88% - 92%)  Wt(kg): --        05-15-23 @ 07:01  -  05-16-23 @ 07:00  --------------------------------------------------------  IN: 400 mL / OUT: 3150 mL / NET: -2750 mL    05-16-23 @ 07:01  -  05-16-23 @ 11:27  --------------------------------------------------------  IN: 250 mL / OUT: 375 mL / NET: -125 mL          LABS/ CULTURES/ RADIOLOGY:              12.4   10.01 >-----------<  264      [05-15-23 @ 21:24]              38.2     128  |  89  |  12  ----------------------------<  127      [05-16-23 @ 06:13]  4.4   |  28  |  0.75        Ca     9.5     [05-16-23 @ 06:13]      Mg     2.10     [05-16-23 @ 06:13]      Phos  4.1     [05-16-23 @ 06:13]      PHYSICAL EXAM  Constitutional: Patient received sitting in the chair, transfer back to bed for skin assessment. On contact precautions    GEN: NAD, alert and oriented x 3  HEENT: WNL  CHEST: Symmetrical chest rise, no increased work of breathing, no stridor.  HEART: Rate regular   ABD: Obese, soft, non-distended, non-tender.   EXT: No erythema/edema. Warm, sensate. Up in room independently. Turn in bed independently    INTEGUMENT/WOUND(S):  R Buttock with mild induration and blanching erythema entire affected area measuring. Non tender, no crepitus, fluctuance.   Prev 6 x 8.5 x 2.2 cm.  Wound edge appears erosive and irregular, with multiple immediately adjacent satellite ulcerations exposing dry yellow fibrinous tissue.       - Small nodular lesion at 9 o'clock away from main wound.  measuring 1 x 1.5 cm. Lesion is soft, without ulceration.   - No crepitus/bullae  L Lateral lower leg gaiter Region-1.5cmx1.5cmx0.1cm, 2 x 1.5 x 0.1 cm. Clean, scant exudate. Base is healthy granulation tissue.   - Hyperkeratosis and hyperpigmentation of surrounding skin.   - No erythema, induration, suppuration, malodor, crepitus or bullae present     - Left buttock well-healed I&D site, non draining scab measuring less than 0.5cm.       FULL NOTE TO FOLLOW    CAPILLARY BLOOD GLUCOSE      POCT Blood Glucose.: 119 mg/dL (16 May 2023 09:12)            Vitamin D, 25-Hydroxy: 30.3 ng/mL (05-08-23 @ 05:55)        Culture - Blood (collected 05-10-23 @ 15:21)  Source: .Blood Blood-Peripheral  Final Report (05-15-23 @ 22:00):    No Growth Final    Culture - Blood (collected 05-10-23 @ 07:30)  Source: .Blood Blood-Peripheral  Final Report (05-15-23 @ 11:00):    No Growth Final          A1C with Estimated Average Glucose Result: 6.3 % (05-08-23 @ 05:55)  A1C with Estimated Average Glucose Result: 6.3 % (02-05-23 @ 10:43)                 [facility]-- WOUND TEAM -- FOLLOW UP NOTE  --------------------------------------------------------------------------------    subjective: Patient seen and examined at bedside. Patient endorses nausea today. Denies ABD pain or diarrhea. Reports given nausea his appetite has decrease. Otherwise no complaints. Endorses he walks in the room and turn side to side independently Also reports he sits in the chair for less than 2 hours, only sits in the chair for meals.      Interval HPI/24 hour events:   ID continues to f/u  Dispo planning   Chart reviewed including labs and relevant images      Diet:  Diet, Consistent Carbohydrate w/Evening Snack:   1000mL Fluid Restriction ([id number]) (05-15-23 @ 11:05)      ROS: General/ SKIN/ see above   all other systems negative      ALLERGIES & MEDICATIONS  --------------------------------------------------------------------------------  Allergies    amoxicillin (Fever)  penicillin (Rash)    Intolerances      STANDING INPATIENT MEDICATIONS    ARIPiprazole 15 milliGRAM(s) Oral daily  atorvastatin 40 milliGRAM(s) Oral at bedtime  budesonide 160 MICROgram(s)/formoterol 4.5 MICROgram(s) Inhaler 2 Puff(s) Inhalation two times a day  cholecalciferol 2000 Unit(s) Oral daily  Dakins Solution - 1/4 Strength 1 Application(s) Topical two times a day  diltiazem    milliGRAM(s) Oral daily  enoxaparin Injectable 40 milliGRAM(s) SubCutaneous every 24 hours  lidocaine   4% Patch 1 Patch Transdermal every 24 hours  losartan 100 milliGRAM(s) Oral daily  sodium chloride 1 Gram(s) Oral three times a day  vancomycin  IVPB 1250 milliGRAM(s) IV Intermittent every 8 hours      PRN INPATIENT MEDICATION  acetaminophen     Tablet .. 975 milliGRAM(s) Oral every 6 hours PRN  albuterol    90 MICROgram(s) HFA Inhaler 2 Puff(s) Inhalation every 6 hours PRN  OLANZapine 5 milliGRAM(s) Oral every 6 hours PRN      Vital signs:  T(C): 36.5 (05-16-23 @ 05:34), Max: 37 (05-15-23 @ 17:26)  HR: 90 (05-16-23 @ 05:34) (62 - 90)  BP: 157/86 (05-16-23 @ 05:34) (144/82 - 157/86)  RR: 18 (05-16-23 @ 05:34) (17 - 19)  SpO2: 88% (05-16-23 @ 05:34) (88% - 92%)  Wt(kg): --        05-15-23 @ 07:01  -  05-16-23 @ 07:00  --------------------------------------------------------  IN: 400 mL / OUT: 3150 mL / NET: -2750 mL    05-16-23 @ 07:01  -  05-16-23 @ 11:27  --------------------------------------------------------  IN: 250 mL / OUT: 375 mL / NET: -125 mL      LABS/ CULTURES/ RADIOLOGY:              12.4   10.01 >-----------<  264      [05-15-23 @ 21:24]              38.2     128  |  89  |  12  ----------------------------<  127      [05-16-23 @ 06:13]  4.4   |  28  |  0.75        Ca     9.5     [05-16-23 @ 06:13]      Mg     2.10     [05-16-23 @ 06:13]      Phos  4.1     [05-16-23 @ 06:13]      PHYSICAL EXAM  Constitutional: Patient received sitting in the chair, transfer back to bed for skin assessment. On contact precautions    GEN: NAD, alert and oriented x 3  HEENT: WNL  CHEST: Symmetrical chest rise, no increased work of breathing, no stridor.  HEART: Rate regular   ABD: Obese, soft, non-distended, non-tender.   EXT: No erythema/edema. Warm, sensate. Up in room independently. Turn in bed independently    INTEGUMENT/WOUND(S):  R Buttock  with mild induration and blanching erythema entire affected area measuring [id number]. Non tender, no crepitus, no fluctuance.   Open atypical wound with abscess in right buttock  - measuring- 4.7cmx7.3cmx1.5cm. Prev 6 x 8.5 x 2.2 cm.  Wound edge appears erosive and irregular, with multiple immediately adjacent satellite ulcerations exposing dry yellow fibrinous tissue. Tissue type in wound bed with mostly pink granular/viable adipose tissue and granular tissue with scattered fibrinous tissue. Moderate serosanguinous drainage. No odor. Periwound skin as above. No tissue type changes. Wet to dry Dakins place. Small non draining nodular lesion at 9 o'clock 2.3 away from main wound measuring 0.7cmx0.9wiv9hy, prev 1 x 1.5 cm. Lesion is soft, without ulceration. No crepitus/bullae  L Lateral lower leg gaiter Region chronic wound -1.5cmx1.5cmx0.1cm, 2 x 1.5 x 0.1 cm. Clean, scant exudate. Base is healthy granulation tissue.   - Hyperkeratosis and hyperpigmentation of surrounding skin.   - No erythema, induration, suppuration, malodor, crepitus or bullae present    Left buttock well-healed I&D site, non draining scab measuring less than 0.5cm.   Moisture associated dermatitis in lower buttocks ans perineum as evident by increase moisture and hyperpigmentation Maverick barrier cream applied.     CAPILLARY BLOOD GLUCOSE      POCT Blood Glucose.: 119 mg/dL (16 May 2023 09:12)      Vitamin D, 25-Hydroxy: 30.3 ng/mL (05-08-23 @ 05:55)        Culture - Blood (collected 05-10-23 @ 15:21)  Source: .Blood Blood-Peripheral  Final Report (05-15-23 @ 22:00):    No Growth Final    Culture - Blood (collected 05-10-23 @ 07:30)  Source: .Blood Blood-Peripheral  Final Report (05-15-23 @ 11:00):    No Growth Final          A1C with Estimated Average Glucose Result: 6.3 % (05-08-23 @ 05:55)  A1C with Estimated Average Glucose Result: 6.3 % (02-05-23 @ 10:43)

## 2023-05-16 NOTE — PROGRESS NOTE ADULT - ASSESSMENT
Assessment/Plan:     full note to follow    Wound care f/u for right buttock abscess wound with MRSA s/p debridement by wound care team.   -Stable   -Mostly granular tissue, scattered fibrinous tissue   -No reported tenderness   Periwound in with mild blanching erythema and tenderness.   -ID continues to f/u, on vancomycin as per ID.       Continue low airloss support surface.  Continue to turn and position every 2 hours with z-roz fluidized positioning device.  Continue use of Complete Cair pressure offloading boots.  Continue Nutritional management as per RD recommendations.    Upon discharge follow up at outpatient [facility]. [address] [place] [address]. [phone].    Will continue to follow while inpatient. Please reconsult eralier if needed   Thank you.    Disussed findings and plan with primary team MD Mary Thomas, AGNP-BC, CWOC    pager #[phone] or available in MS teams     If after 4PM or before 7:30AM on Mon-Friday or weekend/holiday please contact general surgery for urgent matters.   Team A- [id number]   Team B- [id number]  For non-urgent matters e-mail [email or web address]    I spent 35 minutes face-to-face with this patient of which more than 50% of the time was spent counseling/coordinating care of this patient. Assessment/Plan: Mr. 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    Wound care f/u for right buttock abscess wound with MRSA s/p debridement by wound care team on 5/10.   -Stable, dimensions improving   -WBC wnl, afebrile.   -Mostly granular tissue, scattered fibrinous tissue   -Appreciate derm consult and input   -No reported tenderness   -Periwound in with mild blanching erythema and mild induration   -ID continues to f/u, on vancomycin as per ID.   -Awaiting surgical pathology of right buttock tissue   -Continue to encourage turning and positioning   -Continue to monitor for tissue type changes   -Continue current topical recs for Dakins BID   -Topical recommendations: Clean wound and periwound skin with NS. Pat dry. Apply liquid barrier film to periwound skin. Lightly pack depth with MOistened Dakins gauze 1/4 strength. Cover with ABD pad and paper tape. Change twice a day or PRN if soiled.  Once discharge can change daily or PRN if soiled.     Left leg chronic wound   -previous bx and tissue culture obtained by derm during last admission   -Continues to improve   -Continue with current topical recs to cover with silicone foam with border  -Topical Recommendations: Clean with NS. Pat dry. Apply liquid barrier film to periwound skin. Cover with silicone foam with border. Change daily or prn if soiled.     Continue low airloss support surface.  Continue to turn and position every 2 hours with z-roz fluidized positioning device.  Continue use of Complete Cair pressure offloading boots.  Continue Nutritional management as per RD recommendations.    Upon discharge follow up at outpatient [facility]. [address] [place] [address]. [phone].    Will continue to follow while inpatient. Please reconsult earlier if needed   Thank you.    Discussed findings and plan with primary team MD   Discussed findings with wound care MD Emily Thomas, AGNP-BC, CWOC    pager #[phone] or available in MS teams     If after 4PM or before 7:30AM on Mon-Friday or weekend/holiday please contact general surgery for urgent matters.   Team A- [id number]   Team B- [id number]  For non-urgent matters e-mail [email or web address]    I spent 35 minutes face-to-face with this patient of which more than 50% of the time was spent counseling/coordinating care of this patient. Assessment/Plan: Mr. 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    Wound care f/u for right buttock abscess wound with MRSA s/p debridement by wound care team on 5/10.   -Stable, dimensions improving   -WBC wnl, afebrile.   -Mostly granular tissue, scattered fibrinous tissue   -Appreciate derm consult and input   -No reported tenderness   -Periwound in with mild blanching erythema and mild induration   -ID continues to f/u, on vancomycin as per ID.   -Awaiting surgical pathology of right buttock tissue   -Continue to encourage turning and positioning   -Continue to monitor for tissue type changes   -Continue current topical recs for Dakins BID   -Topical recommendations: Clean wound and periwound skin with NS. Pat dry. Apply liquid barrier film to periwound skin. Lightly pack depth with Moistened Dakins gauze 1/4 strength. Cover with ABD pad and paper tape. Change twice a day or PRN if soiled.  Once discharge can change daily or PRN if soiled.   Once dressing is in place and secure, clean lower buttocks and perineum with skin cleanser. Pat dry. Apply a thin layer of Maverick barrier moisture cream, apply twice a day.     Left leg chronic wound   -previous bx and tissue culture obtained by derm during last admission   -Continues to improve   -Continue with current topical recs to cover with silicone foam with border  -Topical Recommendations: Clean with NS. Pat dry. Apply liquid barrier film to periwound skin. Cover with silicone foam with border. Change daily or prn if soiled.     Continue low airloss support surface.  Continue to turn and position every 2 hours with z-roz fluidized positioning device.  Continue use of Complete Cair pressure offloading boots.  Continue Nutritional management as per RD recommendations.    Upon discharge follow up at outpatient [facility]. [address] [place] [address]. [phone].    Will continue to follow while inpatient. Please reconsult earlier if needed   Thank you.    Discussed findings and plan with primary team MD   Discussed findings with wound care MD Emily Thomas, [facility]-BC, [facility]    pager #[phone] or available in MS teams     If after 4PM or before 7:30AM on Mon-Friday or weekend/holiday please contact general surgery for urgent matters.   Team A- [id number]   Team B- [id number]  For non-urgent matters e-mail [email or web address]    I spent 35 minutes face-to-face with this patient of which more than 50% of the time was spent counseling/coordinating care of this patient.

## 2023-05-16 NOTE — PROGRESS NOTE ADULT - ATTENDING COMMENTS
57M with PMH of schizoaffective d/o, CVID, HTN, DM2, hx of MRSA bacteremia likely in setting of LLE cellulitis who presents to the hospital w/ R gluteal drainage concerning for cellulitis abscess. Currently on empiric abx. Surgery on board. ID/derm/allergy, BH, immunology on board as well. For further management. Infectious workup now revealing MRSA bacteremia, likely related to his buttock abscess. Clinically stable on IV abx. Pending further wound care/improvement. Challenging dispo as inpt psych cannot accept pt w/ PICC line. Pt is pending further psych optimization prior to clearance from DC from hospital.    Pt seen at bedside. Some nausea today. No vomiting. No pain at wound. Tolerating PO. Labs with stable Na.    #R gluteal abscess/cellulitis c/b MRSA bacteremia  #Chronic hyponatremia  #Schizoaffective d/o  #CVID  #HTN  #DM2    -Continue IV Vanco - dose by AUC - appreciate pharm assistance. Appreciate ID recs.  -Cont salt tabs. Trend Na. Change vanco infusion fluid to NS if possible.  - recs appreciated - evaluating for depot regimen.  -CANNOT AMA - Dispo planning w/ BH  - Inpt psych cannot take pt with PICC. No oral option given MRSA bacteremia and potential interaction with his oral psych meds. Discussed w/ ID. Pending further psych optimization and psych clearance for dispo. Prolonged hospitalization due to no safe discharging facility at this point. Will cont to reassess. F/u Psych SW re: possible facilities that may accommodate PICCs.    Rest of plan as above.

## 2023-05-16 NOTE — PROGRESS NOTE ADULT - PROBLEM SELECTOR PLAN 2
Historically diagnosed. Pt confirms schizophrenia but denies being on any active psychotropic medications. Denies AH/VH. Denies SI/HI. Does not specify why he left AMA from other hospital, but does understand that he needs help here. historically lacked capacity to leave AMA, but not trying at this time.  - No need for 1:1 at this time  - Psychiatry/ Consult recs appreciated  - c/w aripiprazole for psychosis/mood  - C/W zyrprexa 5 PO for agitation  - Per , pt cannot leave AMA at this time.  plans for inpatient psych after medically cleared

## 2023-05-16 NOTE — PROGRESS NOTE ADULT - SUBJECTIVE AND OBJECTIVE BOX
CC: Patient is a 57y old  Male who presents with a chief complaint of Gluteal wound (12 May 2023 07:50)    ID following for MRSA bacteremia, gluteal wound infection    Interval History/ROS: Patient has no complaints. No fevers, no chills. No gluteal pain.    Rest of ROS negative.    Allergies  amoxicillin (Fever)  penicillin (Rash)    ANTIMICROBIALS:  vancomycin  IVPB 1250 every 8 hours    OTHER MEDS:  acetaminophen     Tablet .. 975 milliGRAM(s) Oral every 6 hours PRN  albuterol    90 MICROgram(s) HFA Inhaler 2 Puff(s) Inhalation every 6 hours PRN  ARIPiprazole 15 milliGRAM(s) Oral daily  atorvastatin 40 milliGRAM(s) Oral at bedtime  budesonide 160 MICROgram(s)/formoterol 4.5 MICROgram(s) Inhaler 2 Puff(s) Inhalation two times a day  cholecalciferol 2000 Unit(s) Oral daily  Dakins Solution - 1/4 Strength 1 Application(s) Topical two times a day  diltiazem    milliGRAM(s) Oral daily  enoxaparin Injectable 40 milliGRAM(s) SubCutaneous every 24 hours  lidocaine   4% Patch 1 Patch Transdermal every 24 hours  losartan 100 milliGRAM(s) Oral daily  OLANZapine 5 milliGRAM(s) Oral every 6 hours PRN  sodium chloride 1 Gram(s) Oral three times a day    PE:    Vital Signs Last 24 Hrs  T(C): 36.5 (16 May 2023 05:34), Max: 37 (15 May 2023 17:26)  T(F): 97.7 (16 May 2023 05:34), Max: 98.6 (15 May 2023 17:26)  HR: 90 (16 May 2023 05:34) (62 - 90)  BP: 157/86 (16 May 2023 05:34) (150/84 - 157/86)  BP(mean): --  RR: 18 (16 May 2023 05:34) (18 - 19)  SpO2: 88% (16 May 2023 05:34) (88% - 91%)    Parameters below as of 16 May 2023 05:34  Patient On (Oxygen Delivery Method): room air    Gen: AOx3, NAD  CV: S1+S2 normal, no murmurs  Resp: Clear bilat, no resp distress  Abd: Soft, nontender, +BS  Ext: No LE edema, no wounds  : No Perez  IV/Skin: No thrombophlebitis, R buttock wound without purulent drainage  Neuro: no focal deficits    LABS:                          12.4   10.01 )-----------( 264      ( 15 May 2023 21:24 )             38.2       05-16    128<L>  |  89<L>  |  12  ----------------------------<  127<H>  4.4   |  28  |  0.75    Ca    9.5      16 May 2023 06:13  Phos  4.1     05-16  Mg     2.10     05-16            MICROBIOLOGY:  v  .Blood Blood-Peripheral  05-10-23   No Growth Final  --  --      .Blood Blood-Peripheral  05-10-23   No Growth Final  --  --      .Abscess right buttock  05-09-23   Moderate Methicillin Resistant Staphylococcus aureus  --  Methicillin resistant Staphylococcus aureus      .Blood Blood-Peripheral  05-08-23   No Growth Final  --  --      .Blood Blood-Peripheral  05-08-23   No Growth Final  --  --      Clean Catch Clean Catch (Midstream)  05-06-23   No growth  --  --      .Blood Blood-Peripheral  05-06-23   Growth in aerobic bottle: Methicillin Resistant Staphylococcus aureus  ***Blood Panel PCR results on this specimen are available  approximately 3 hours after the Gram stain result.***  Gram stain, PCR, and/or culture results may not always  correspond due to difference in methodologies.  ************************************************************  This PCR assay was performed by multiplex PCR. This  Assay tests for 66 bacterial and resistance gene targets.  Please refer to the [facility] test directory  at [email or web address] for details.  --  Blood Culture PCR  Methicillin resistant Staphylococcus aureus      .Blood Blood-Peripheral  05-06-23   No Growth Final  --  --    RADIOLOGY:    < from: CT Pelvis w/ IV Cont (05.06.23 @ 21:22) >  IMPRESSION:  Moderate subcutaneous inflammatory change and skin thickening in the   right buttocks suggestive of a cellulitis. No associated rim-enhancing   fluid collection to suggest an abscess. No subcutaneous gas.    < end of copied text >

## 2023-05-16 NOTE — PROGRESS NOTE ADULT - SUBJECTIVE AND OBJECTIVE BOX
Hao Us MD PGY-3  Internal Medicine Resident    CHIEF COMPLAINT: Patient is a 57y old  Male who presents with a chief complaint of Gluteal wound (12 May 2023 07:50)      INTERVAL HPI/OVERNIGHT EVENTS: DAYANA ON, HTN SBP 150s, otherwise VSS wnl.    MEDICATIONS (STANDING):  ARIPiprazole 10 milliGRAM(s) Oral daily  atorvastatin 40 milliGRAM(s) Oral at bedtime  budesonide 160 MICROgram(s)/formoterol 4.5 MICROgram(s) Inhaler 2 Puff(s) Inhalation two times a day  cholecalciferol 2000 Unit(s) Oral daily  Dakins Solution - 1/4 Strength 1 Application(s) Topical two times a day  diltiazem    milliGRAM(s) Oral daily  enoxaparin Injectable 40 milliGRAM(s) SubCutaneous every 24 hours  lidocaine   4% Patch 1 Patch Transdermal every 24 hours  losartan 100 milliGRAM(s) Oral daily  sodium chloride 1 Gram(s) Oral three times a day  vancomycin  IVPB 1250 milliGRAM(s) IV Intermittent every 8 hours    MEDICATIONS  (PRN):  acetaminophen     Tablet .. 975 milliGRAM(s) Oral every 6 hours PRN  albuterol    90 MICROgram(s) HFA Inhaler 2 Puff(s) Inhalation every 6 hours PRN  OLANZapine 5 milliGRAM(s) Oral every 6 hours PRN      REVIEW OF SYSTEMS:      T(F): 97.7 (05-16-23 @ 05:34), Max: 98.6 (05-15-23 @ 17:26)  HR: 90 (05-16-23 @ 05:34) (62 - 90)  BP: 157/86 (05-16-23 @ 05:34) (144/82 - 157/86)  RR: 18 (05-16-23 @ 05:34) (17 - 19)  SpO2: 88% (05-16-23 @ 05:34) (88% - 92%)  Wt(kg): --  CAPILLARY BLOOD GLUCOSE        I&O's Summary    14 May 2023 07:01  -  15 May 2023 07:00  --------------------------------------------------------  IN: 250 mL / OUT: 2250 mL / NET: -2000 mL    15 May 2023 07:01  -  16 May 2023 06:51  --------------------------------------------------------  IN: 400 mL / OUT: 1050 mL / NET: -650 mL        PHYSICAL EXAM:      LABS:                        12.4   10.01 )-----------( 264      ( 15 May 2023 21:24 )             38.2     05-15    126<L>  |  87<L>  |  13  ----------------------------<  123<H>  5.3   |  24  |  0.83    Ca    9.4      15 May 2023 21:24  Phos  4.1     05-15  Mg     2.20     05-15              RADIOLOGY & ADDITIONAL TESTS:       aHo Us MD PGY-3  Internal Medicine Resident    CHIEF COMPLAINT: Patient is a 57y old  Male who presents with a chief complaint of Gluteal wound (12 May 2023 07:50)      INTERVAL HPI/OVERNIGHT EVENTS: DAYANA ON, HTN SBP 150s, otherwise VSS wnl. Pt assessed at bedside, relays feeling generally well, denies complaints though last BM days ago (passing flatus).     MEDICATIONS (STANDING):  ARIPiprazole 10 milliGRAM(s) Oral daily  atorvastatin 40 milliGRAM(s) Oral at bedtime  budesonide 160 MICROgram(s)/formoterol 4.5 MICROgram(s) Inhaler 2 Puff(s) Inhalation two times a day  cholecalciferol 2000 Unit(s) Oral daily  Dakins Solution - 1/4 Strength 1 Application(s) Topical two times a day  diltiazem    milliGRAM(s) Oral daily  enoxaparin Injectable 40 milliGRAM(s) SubCutaneous every 24 hours  lidocaine   4% Patch 1 Patch Transdermal every 24 hours  losartan 100 milliGRAM(s) Oral daily  sodium chloride 1 Gram(s) Oral three times a day  vancomycin  IVPB 1250 milliGRAM(s) IV Intermittent every 8 hours    MEDICATIONS  (PRN):  acetaminophen     Tablet .. 975 milliGRAM(s) Oral every 6 hours PRN  albuterol    90 MICROgram(s) HFA Inhaler 2 Puff(s) Inhalation every 6 hours PRN  OLANZapine 5 milliGRAM(s) Oral every 6 hours PRN      REVIEW OF SYSTEMS:  CONSTITUTIONAL: No fever or chills  EYES: No visual disturbances or eye pain  ENMT: No sinus or throat pain  RESPIRATORY: No shortness of breath or cough  CARDIOVASCULAR: No chest pain or dizziness  GASTROINTESTINAL: No abdominal or epigastric pain. No diarrhea. No melena or hematochezia. +constipated (passing flatus)   GENITOURINARY: No dysuria or hematuria  NEUROLOGICAL: No headaches, loss of strength or numbness  MUSCULOSKELETAL: No joint pain or swelling; No muscle, back, or extremity pain        T(F): 97.7 (05-16-23 @ 05:34), Max: 98.6 (05-15-23 @ 17:26)  HR: 90 (05-16-23 @ 05:34) (62 - 90)  BP: 157/86 (05-16-23 @ 05:34) (144/82 - 157/86)  RR: 18 (05-16-23 @ 05:34) (17 - 19)  SpO2: 88% (05-16-23 @ 05:34) (88% - 92%)  Wt(kg): --  CAPILLARY BLOOD GLUCOSE        I&O's Summary    14 May 2023 07:01  -  15 May 2023 07:00  --------------------------------------------------------  IN: 250 mL / OUT: 2250 mL / NET: -2000 mL    15 May 2023 07:01  -  16 May 2023 06:51  --------------------------------------------------------  IN: 400 mL / OUT: 1050 mL / NET: -650 mL        PHYSICAL EXAM:  GENERAL: NAD, well-groomed, well-developed, pleasant to interview  HEAD: Atraumatic, Normocephalic  EYES: PERRL, conjunctiva and sclera clear  ENMT: No tonsillar erythema, exudates, or enlargement; Moist mucous membranes  NECK: Supple  NERVOUS SYSTEM: Alert & Oriented X3, Good concentration; Motor Strength 5/5 B/L upper and lower extremities  CHEST/LUNG: Clear to auscultation bilaterally; No rales, rhonchi, wheezing, or rubs +diminished sounds at bases  HEART: Regular rate and rhythm; No murmurs, rubs, or gallops  ABDOMEN: Soft, Nontender, Nondistended; Bowel sounds present. No guarding, rebound tenderness, or rigidity.  EXTREMITIES: 2+ Peripheral Pulses, No edema        LABS:                        12.4   10.01 )-----------( 264      ( 15 May 2023 21:24 )             38.2     05-15    126<L>  |  87<L>  |  13  ----------------------------<  123<H>  5.3   |  24  |  0.83    Ca    9.4      15 May 2023 21:24  Phos  4.1     05-15  Mg     2.20     05-15              RADIOLOGY & ADDITIONAL TESTS:       Hao Us MD PGY-3  Internal Medicine Resident    CHIEF COMPLAINT: Patient is a 57y old  Male who presents with a chief complaint of Gluteal wound (12 May 2023 07:50)      INTERVAL HPI/OVERNIGHT EVENTS: DAYANA ON, HTN SBP 150s, otherwise VSS wnl. Pt assessed at bedside, relays feeling generally well, denies complaints though last BM days ago (passing flatus).     MEDICATIONS (STANDING):  ARIPiprazole 10 milliGRAM(s) Oral daily  atorvastatin 40 milliGRAM(s) Oral at bedtime  budesonide 160 MICROgram(s)/formoterol 4.5 MICROgram(s) Inhaler 2 Puff(s) Inhalation two times a day  cholecalciferol 2000 Unit(s) Oral daily  Dakins Solution - 1/4 Strength 1 Application(s) Topical two times a day  diltiazem    milliGRAM(s) Oral daily  enoxaparin Injectable 40 milliGRAM(s) SubCutaneous every 24 hours  lidocaine   4% Patch 1 Patch Transdermal every 24 hours  losartan 100 milliGRAM(s) Oral daily  sodium chloride 1 Gram(s) Oral three times a day  vancomycin  IVPB 1250 milliGRAM(s) IV Intermittent every 8 hours    MEDICATIONS  (PRN):  acetaminophen     Tablet .. 975 milliGRAM(s) Oral every 6 hours PRN  albuterol    90 MICROgram(s) HFA Inhaler 2 Puff(s) Inhalation every 6 hours PRN  OLANZapine 5 milliGRAM(s) Oral every 6 hours PRN      REVIEW OF SYSTEMS:  CONSTITUTIONAL: No fever or chills  EYES: No visual disturbances or eye pain  ENMT: No sinus or throat pain  RESPIRATORY: No shortness of breath or cough  CARDIOVASCULAR: No chest pain or dizziness  GASTROINTESTINAL: No abdominal or epigastric pain. No diarrhea. No melena or hematochezia. +constipated (passing flatus)   GENITOURINARY: No dysuria or hematuria  NEUROLOGICAL: No headaches, loss of strength or numbness  MUSCULOSKELETAL: No joint pain or swelling; No muscle, back, or extremity pain        T(F): 97.7 (05-16-23 @ 05:34), Max: 98.6 (05-15-23 @ 17:26)  HR: 90 (05-16-23 @ 05:34) (62 - 90)  BP: 157/86 (05-16-23 @ 05:34) (144/82 - 157/86)  RR: 18 (05-16-23 @ 05:34) (17 - 19)  SpO2: 88% (05-16-23 @ 05:34) (88% - 92%)  Wt(kg): --  CAPILLARY BLOOD GLUCOSE        I&O's Summary    14 May 2023 07:01  -  15 May 2023 07:00  --------------------------------------------------------  IN: 250 mL / OUT: 2250 mL / NET: -2000 mL    15 May 2023 07:01  -  16 May 2023 06:51  --------------------------------------------------------  IN: 400 mL / OUT: 1050 mL / NET: -650 mL        PHYSICAL EXAM:  GENERAL: NAD, well-groomed, well-developed, pleasant to interview  HEAD: Atraumatic, Normocephalic  EYES: PERRL, conjunctiva and sclera clear  ENMT: No tonsillar erythema, exudates, or enlargement; Moist mucous membranes  NECK: Supple  NERVOUS SYSTEM: Alert & Oriented X3, Good concentration; Motor Strength 5/5 B/L upper and lower extremities  CHEST/LUNG: Clear to auscultation bilaterally; No rales, rhonchi, wheezing, or rubs +diminished sounds at bases  HEART: Regular rate and rhythm; No murmurs, rubs, or gallops  ABDOMEN: Soft, Nontender, Nondistended; Bowel sounds present. No guarding, rebound tenderness, or rigidity.  EXTREMITIES: 2+ Peripheral Pulses, No edema  SKIN: +R gluteal wound w/ overlying dressing not saturated/ w/o overt bleed/purulence        LABS:                        12.4   10.01 )-----------( 264      ( 15 May 2023 21:24 )             38.2     05-15    126<L>  |  87<L>  |  13  ----------------------------<  123<H>  5.3   |  24  |  0.83    Ca    9.4      15 May 2023 21:24  Phos  4.1     05-15  Mg     2.20     05-15              RADIOLOGY & ADDITIONAL TESTS:

## 2023-05-16 NOTE — PROGRESS NOTE ADULT - PROBLEM SELECTOR PLAN 10
Diet: CC  DVT: lovenox  Dispo: Unable to give IV antibiotics at [facility], may need to stay inpatient until 6/5 for IV abx unless recommendation for inpatient psychiatry changes

## 2023-05-17 ENCOUNTER — TRANSCRIPTION ENCOUNTER (OUTPATIENT)
Age: 58
End: 2023-05-17

## 2023-05-17 LAB
ANION GAP SERPL CALC-SCNC: 13 MMOL/L — SIGNIFICANT CHANGE UP (ref 7–14)
BUN SERPL-MCNC: 10 MG/DL — SIGNIFICANT CHANGE UP (ref 7–23)
CALCIUM SERPL-MCNC: 9.3 MG/DL — SIGNIFICANT CHANGE UP (ref 8.4–10.5)
CHLORIDE SERPL-SCNC: 90 MMOL/L — LOW (ref 98–107)
CO2 SERPL-SCNC: 26 MMOL/L — SIGNIFICANT CHANGE UP (ref 22–31)
CREAT SERPL-MCNC: 0.68 MG/DL — SIGNIFICANT CHANGE UP (ref 0.5–1.3)
EGFR: 110 ML/MIN/1.73M2 — SIGNIFICANT CHANGE UP
GLUCOSE SERPL-MCNC: 119 MG/DL — HIGH (ref 70–99)
MAGNESIUM SERPL-MCNC: 2 MG/DL — SIGNIFICANT CHANGE UP (ref 1.6–2.6)
MAGNESIUM SERPL-MCNC: 2 MG/DL — SIGNIFICANT CHANGE UP (ref 1.6–2.6)
PHOSPHATE SERPL-MCNC: 3.8 MG/DL — SIGNIFICANT CHANGE UP (ref 2.5–4.5)
PHOSPHATE SERPL-MCNC: 3.8 MG/DL — SIGNIFICANT CHANGE UP (ref 2.5–4.5)
POTASSIUM SERPL-MCNC: 4.4 MMOL/L — SIGNIFICANT CHANGE UP (ref 3.5–5.3)
POTASSIUM SERPL-SCNC: 4.4 MMOL/L — SIGNIFICANT CHANGE UP (ref 3.5–5.3)
SODIUM SERPL-SCNC: 129 MMOL/L — LOW (ref 135–145)
VANCOMYCIN FLD-MCNC: 10.7 UG/ML — SIGNIFICANT CHANGE UP

## 2023-05-17 PROCEDURE — 99232 SBSQ HOSP IP/OBS MODERATE 35: CPT | Mod: GC

## 2023-05-17 RX ADMIN — LOSARTAN POTASSIUM 100 MILLIGRAM(S): 100 TABLET, FILM COATED ORAL at 06:33

## 2023-05-17 RX ADMIN — SODIUM CHLORIDE 1 GRAM(S): 9 INJECTION INTRAMUSCULAR; INTRAVENOUS; SUBCUTANEOUS at 06:33

## 2023-05-17 RX ADMIN — SODIUM CHLORIDE 1 GRAM(S): 9 INJECTION INTRAMUSCULAR; INTRAVENOUS; SUBCUTANEOUS at 13:39

## 2023-05-17 RX ADMIN — ARIPIPRAZOLE 15 MILLIGRAM(S): 15 TABLET ORAL at 12:29

## 2023-05-17 RX ADMIN — Medication 300 MILLIGRAM(S): at 06:33

## 2023-05-17 RX ADMIN — Medication 1 APPLICATION(S): at 06:41

## 2023-05-17 RX ADMIN — ENOXAPARIN SODIUM 40 MILLIGRAM(S): 100 INJECTION SUBCUTANEOUS at 12:29

## 2023-05-17 RX ADMIN — Medication 2000 UNIT(S): at 12:29

## 2023-05-17 RX ADMIN — Medication 166.67 MILLIGRAM(S): at 12:28

## 2023-05-17 RX ADMIN — SODIUM CHLORIDE 1 GRAM(S): 9 INJECTION INTRAMUSCULAR; INTRAVENOUS; SUBCUTANEOUS at 21:31

## 2023-05-17 RX ADMIN — ATORVASTATIN CALCIUM 40 MILLIGRAM(S): 80 TABLET, FILM COATED ORAL at 21:31

## 2023-05-17 RX ADMIN — Medication 166.67 MILLIGRAM(S): at 19:46

## 2023-05-17 RX ADMIN — BUDESONIDE AND FORMOTEROL FUMARATE DIHYDRATE 2 PUFF(S): 160; 4.5 AEROSOL RESPIRATORY (INHALATION) at 21:31

## 2023-05-17 RX ADMIN — Medication 1 APPLICATION(S): at 18:04

## 2023-05-17 NOTE — DISCHARGE NOTE PROVIDER - NSDCCPCAREPLAN_GEN_ALL_CORE_FT
PRINCIPAL DISCHARGE DIAGNOSIS  Diagnosis: Cellulitis, gluteal, right  Assessment and Plan of Treatment: You were admitted to the hospital due a bloodstream and right gluteal skin infection in the setting of MRSA bacteria, which was debrided by the wound care team and for which you were started on intravenous antibiotics (vancomycin).   You will continue to take antibiotics as prescribed and will follow up with your primary care doctor, infectious disease, wound care, dermatology, and allergy/immunology teams upon discharge.  Please return to the hospital if you experience fever, chills, severe headache, dizziness, lightheadedness, loss of consciousness, visual disturbance, chest pain, palpitations, shortness of breath,  abdominal pain, nausea, vomiting, diarrhea, blood in your vomit/stool/urine, burning with urination or change in urinary pattern, numbness, weakness, tingling, or other alarming symptoms.        SECONDARY DISCHARGE DIAGNOSES  Diagnosis: Schizoaffective disorder  Assessment and Plan of Treatment: During your admission to the hospital, the behavioral health team was consulted in your care and recommended changes to your psychiatric medications to optimize your mental health.  You will follow up with your primary care doctor and behavioral health team for further evaluation and management of this upon discharge from the hospital.  Please return to the hospital if you experience fever, chills, severe headache, dizziness, lightheadedness, loss of consciousness, visual disturbance, chest pain, palpitations, shortness of breath,  abdominal pain, nausea, vomiting, diarrhea, blood in your vomit/stool/urine, burning with urination or change in urinary pattern, numbness, weakness, tingling, or other alarming symptoms.       PRINCIPAL DISCHARGE DIAGNOSIS  Diagnosis: Cellulitis, gluteal, right  Assessment and Plan of Treatment: You were admitted to the hospital due a bloodstream and right gluteal skin infection in the setting of MRSA bacteria, which was debrided by the wound care team and for which you were started on intravenous antibiotics (vancomycin).   You no longer need to take antibiotics to treat your infection.  Please return to the hospital if you experience fever, chills, severe headache, dizziness, lightheadedness, loss of consciousness, visual disturbance, chest pain, palpitations, shortness of breath,  abdominal pain, nausea, vomiting, diarrhea, blood in your vomit/stool/urine, burning with urination or change in urinary pattern, numbness, weakness, tingling, or other alarming symptoms.        SECONDARY DISCHARGE DIAGNOSES  Diagnosis: Schizoaffective disorder  Assessment and Plan of Treatment: During your admission to the hospital, the behavioral health team was consulted in your care and recommended changes to your psychiatric medications to optimize your mental health. You will be getting monthly injections of Abilify, your next dose will be 6/20/23 with Dr. Cook.  You will follow up with your primary care doctor and behavioral health team for further evaluation and management of this upon discharge from the hospital.      Diagnosis: Hyponatremia  Assessment and Plan of Treatment: Please continue to limit your fluid intake to 1.2 liters per day. Please continue to take the salt tablets prescribed to you. Please follow-up with your primary care provider in 1-2 weeks for repeat testing of your sodium level. Return to the hospital for worsening weakness, headaches, or seizures.

## 2023-05-17 NOTE — DISCHARGE NOTE PROVIDER - HOSPITAL COURSE
57M schizoaffective d/o, CVID, bronchiectasis, HTN, DM, previous LLE cellulitis/abscess and MRSA bacteremia, recent admit OSH ([place]) for R buttock wound w/ cellulitis though left AMA on clindamycin, a/w worsened sx (CT e/o cellulitis w/o abscess) s/p debridement of necrotic skin/fat (5/10) w/ Cx+ MRSA (and MRSA bacteremia) initiated on vancomyin (ID c/s) course to end tentatively 6/5, course c/b hypoNa and  titrating psychiatric regimen w/ intention for inpt psychiatric admit. 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     case and plan d/w Dr. Agarwal (attending physician) 57M schizoaffective d/o, CVID, bronchiectasis, HTN, DM, previous LLE cellulitis/abscess and MRSA bacteremia, recent admit OSH ([place]) for R buttock wound w/ cellulitis though left AMA on clindamycin, a/w worsened sx (CT e/o cellulitis w/o abscess) s/p debridement of necrotic skin/fat (5/10) w/ Cx+ MRSA (and MRSA bacteremia) initiated on vancomyin (ID c/s) course to end tentatively 6/5, course c/b hypoNa that improved with 1.2L fluid restriction and salt tabs.  titrating psychiatric regimen and initially rec'd transfer to [facility] after finishing abx. However, pt's psych status improved and received BELLA 5/23 and cleared for dc home. 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    57M schizoaffective d/o, CVID, bronchiectasis, HTN, DM, previous LLE cellulitis/abscess and MRSA bacteremia, recent admit OSH ([place]) for R buttock wound w/ cellulitis though left AMA on clindamycin, a/w worsened sx (CT e/o cellulitis w/o abscess) s/p debridement of necrotic skin/fat (5/10) w/ Cx+ MRSA (and MRSA bacteremia) initiated on vancomyin (ID c/s) course to end tentatively 6/5, course c/b hypoNa that improved with 1.2L fluid restriction and salt tabs.  titrating psychiatric regimen and initially rec'd transfer to [facility] after finishing abx. However, pt's psych status improved and received BELLA 5/23 and cleared for dc home. 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     Pt is medically optimized for discharge with close f/u with PCP, wound care.

## 2023-05-17 NOTE — PROGRESS NOTE ADULT - SUBJECTIVE AND OBJECTIVE BOX
Hao Us MD PGY-3  Internal Medicine Resident    CHIEF COMPLAINT: Patient is a 57y old  Male who presents with a chief complaint of Gluteal wound (12 May 2023 07:50)      INTERVAL HPI/OVERNIGHT EVENTS: DAYANA ON, HTN SBP 140s-170s, SpO2%  on 2L supplemental O2 via NC, otherwise VS grossly wnl.    MEDICATIONS (STANDING):  ARIPiprazole 15 milliGRAM(s) Oral daily  atorvastatin 40 milliGRAM(s) Oral at bedtime  budesonide 160 MICROgram(s)/formoterol 4.5 MICROgram(s) Inhaler 2 Puff(s) Inhalation two times a day  cholecalciferol 2000 Unit(s) Oral daily  Dakins Solution - 1/4 Strength 1 Application(s) Topical two times a day  diltiazem    milliGRAM(s) Oral daily  enoxaparin Injectable 40 milliGRAM(s) SubCutaneous every 24 hours  lidocaine   4% Patch 1 Patch Transdermal every 24 hours  losartan 100 milliGRAM(s) Oral daily  sodium chloride 1 Gram(s) Oral three times a day  vancomycin  IVPB 1250 milliGRAM(s) IV Intermittent every 8 hours    MEDICATIONS  (PRN):  acetaminophen     Tablet .. 975 milliGRAM(s) Oral every 6 hours PRN  albuterol    90 MICROgram(s) HFA Inhaler 2 Puff(s) Inhalation every 6 hours PRN  OLANZapine 5 milliGRAM(s) Oral every 6 hours PRN      REVIEW OF SYSTEMS:      T(F): 97.5 (05-17-23 @ 06:38), Max: 97.6 (05-16-23 @ 22:02)  HR: 80 (05-17-23 @ 06:38) (67 - 80)  BP: 144/75 (05-17-23 @ 06:38) (144/75 - 173/85)  RR: 18 (05-17-23 @ 06:38) (18 - 19)  SpO2: 100% (05-17-23 @ 06:38) (93% - 100%)  Wt(kg): --  CAPILLARY BLOOD GLUCOSE      POCT Blood Glucose.: 119 mg/dL (16 May 2023 09:12)    I&O's Summary    16 May 2023 07:01  -  17 May 2023 07:00  --------------------------------------------------------  IN: 900 mL / OUT: 975 mL / NET: -75 mL        PHYSICAL EXAM:      LABS:                        12.4   10.01 )-----------( 264      ( 15 May 2023 21:24 )             38.2     05-17    129<L>  |  90<L>  |  10  ----------------------------<  119<H>  4.4   |  26  |  0.68    Ca    9.3      17 May 2023 05:50  Phos  3.8     05-17  Mg     2.00     05-17              RADIOLOGY & ADDITIONAL TESTS:       Hao Us MD PGY-3  Internal Medicine Resident    CHIEF COMPLAINT: Patient is a 57y old  Male who presents with a chief complaint of Gluteal wound (12 May 2023 07:50)      INTERVAL HPI/OVERNIGHT EVENTS: DAYANA ON, HTN SBP 140s-170s, SpO2%  on 2L supplemental O2 via NC, otherwise VS grossly wnl. Pt assessed at bedside, relays feeling generally well, denies complaints.     MEDICATIONS (STANDING):  ARIPiprazole 15 milliGRAM(s) Oral daily  atorvastatin 40 milliGRAM(s) Oral at bedtime  budesonide 160 MICROgram(s)/formoterol 4.5 MICROgram(s) Inhaler 2 Puff(s) Inhalation two times a day  cholecalciferol 2000 Unit(s) Oral daily  Dakins Solution - 1/4 Strength 1 Application(s) Topical two times a day  diltiazem    milliGRAM(s) Oral daily  enoxaparin Injectable 40 milliGRAM(s) SubCutaneous every 24 hours  lidocaine   4% Patch 1 Patch Transdermal every 24 hours  losartan 100 milliGRAM(s) Oral daily  sodium chloride 1 Gram(s) Oral three times a day  vancomycin  IVPB 1250 milliGRAM(s) IV Intermittent every 8 hours    MEDICATIONS  (PRN):  acetaminophen     Tablet .. 975 milliGRAM(s) Oral every 6 hours PRN  albuterol    90 MICROgram(s) HFA Inhaler 2 Puff(s) Inhalation every 6 hours PRN  OLANZapine 5 milliGRAM(s) Oral every 6 hours PRN      REVIEW OF SYSTEMS:  CONSTITUTIONAL: No fever or chills  EYES: No visual disturbances or eye pain  ENMT: No sinus or throat pain  RESPIRATORY: No shortness of breath or cough  CARDIOVASCULAR: No chest pain or dizziness  GASTROINTESTINAL: No abdominal or epigastric pain. No diarrhea or constipation. No melena or hematochezia.   GENITOURINARY: No dysuria or hematuria  NEUROLOGICAL: No headaches, loss of strength or numbness  MUSCULOSKELETAL: No joint pain or swelling; No muscle, back, or extremity pain        T(F): 97.5 (05-17-23 @ 06:38), Max: 97.6 (05-16-23 @ 22:02)  HR: 80 (05-17-23 @ 06:38) (67 - 80)  BP: 144/75 (05-17-23 @ 06:38) (144/75 - 173/85)  RR: 18 (05-17-23 @ 06:38) (18 - 19)  SpO2: 100% (05-17-23 @ 06:38) (93% - 100%)  Wt(kg): --  CAPILLARY BLOOD GLUCOSE      POCT Blood Glucose.: 119 mg/dL (16 May 2023 09:12)    I&O's Summary    16 May 2023 07:01  -  17 May 2023 07:00  --------------------------------------------------------  IN: 900 mL / OUT: 975 mL / NET: -75 mL        PHYSICAL EXAM:  GENERAL: NAD, well-groomed, well-developed, pleasant to interview  HEAD: Atraumatic, Normocephalic  EYES: PERRL, conjunctiva and sclera clear  ENMT: No tonsillar erythema, exudates, or enlargement; Moist mucous membranes  NECK: Supple  NERVOUS SYSTEM: Alert & Oriented X3, Good concentration; Motor Strength 5/5 B/L upper and lower extremities  CHEST/LUNG: Clear to auscultation bilaterally; No rales, rhonchi, wheezing, or rubs +diminished sounds at bases  HEART: Regular rate and rhythm; No murmurs, rubs, or gallops  ABDOMEN: Soft, Nontender, Nondistended; Bowel sounds present. No guarding, rebound tenderness, or rigidity.  EXTREMITIES: 2+ Peripheral Pulses, No edema  SKIN: +R gluteal wound w/ overlying dressing not saturated/ w/o overt bleed/purulence      LABS:                        12.4   10.01 )-----------( 264      ( 15 May 2023 21:24 )             38.2     05-17    129<L>  |  90<L>  |  10  ----------------------------<  119<H>  4.4   |  26  |  0.68    Ca    9.3      17 May 2023 05:50  Phos  3.8     05-17  Mg     2.00     05-17              RADIOLOGY & ADDITIONAL TESTS:

## 2023-05-17 NOTE — DISCHARGE NOTE PROVIDER - NSDCFUSCHEDAPPT_GEN_ALL_CORE_FT
[facility]  RHEUM [address] [facility]  Scheduled Appointment: 06/08/2023    Boxer, Mitchell B  [facility]  ALLERGYIM [address]  Scheduled Appointment: 06/14/2023    [facility]  R[address]UM [address] [facility]  Scheduled Appointment: 07/06/2023

## 2023-05-17 NOTE — PROGRESS NOTE ADULT - PROBLEM SELECTOR PLAN 4
PT with h/o CVID requiring IgG infusions q1mo (last 4/13/2023).  OP Immunologist: Dr. Mitchell Boxer ([facility])  IGg level low at 489   s/p gammagard 75 grams on 5/8. D/w A&I fellow Dr. Kimura on 5/10, no need to re-dose with gammagard S/D at this time. Can resume usual monthly infusions after discharge.    - Allergy and Immunology recs appreciated   -recorded allergy to amoxicillin and penicillin however tolerated augmentin in the past, may re-address allergy if he needs a beta lactem

## 2023-05-17 NOTE — DISCHARGE NOTE PROVIDER - CARE PROVIDERS DIRECT ADDRESSES
,[email or web address],DirectAddress_Unknown ,[email or web address],DirectAddress_Unknown,DirectAddress_Unknown

## 2023-05-17 NOTE — DISCHARGE NOTE PROVIDER - PROVIDER TOKENS
PROVIDER:[TOKEN:[[id number]]],PROVIDER:[TOKEN:[[id number]]] PROVIDER:[TOKEN:[[id number]]],PROVIDER:[TOKEN:[[id number]]],PROVIDER:[TOKEN:[[id number]],FOLLOWUP:[2 weeks]]

## 2023-05-17 NOTE — DISCHARGE NOTE PROVIDER - CARE PROVIDER_API CALL
Jose Faye)  Internal Medicine  [address] [place] [address]  Phone: [phone]  Fax: [phone]  Follow Up Time:     Ketan Mcclendon)  Internal Medicine  [address] [place] [address], [address] [address]  Phone: [phone]  Fax: [phone]  Follow Up Time:    Jose Faye)  Internal Medicine  [address] [place] [address]  Phone: [phone]  Fax: [phone]  Follow Up Time:     Ketan Mcclendon  Internal Medicine  [address] [place] [address], [address]  [place] [address]  Phone: [phone]  Fax: [phone]  Follow Up Time:     Hollie Cook Ana Maria  Psychiatry  [address] [place] [place] [address]  Phone: [phone]  Fax: [phone]  Follow Up Time: 2 weeks

## 2023-05-17 NOTE — PROGRESS NOTE ADULT - ATTENDING COMMENTS
57M with PMH of schizoaffective d/o, CVID, HTN, DM2, hx of MRSA bacteremia likely in setting of LLE cellulitis who presents to the hospital w/ R gluteal drainage concerning for cellulitis abscess. Currently on empiric abx. Surgery on board. ID/derm/allergy, , immunology on board as well. For further management. Infectious workup now revealing MRSA bacteremia, likely related to his buttock abscess. Clinically stable on IV abx. Pending further wound care/improvement. Challenging dispo as inpt psych cannot accept pt w/ PICC line. Pt is pending further psych optimization prior to clearance from DC from hospital.    Pt seen at bedside. Feels well. eating ok. Denies any SOB. No difficulty breathing. Was placed on NC in the evening for low sat. On exam, pt comfortable in bed, speaking full sentences. Chest clear throughout, normal effort, no acc muscle use. Buttock lesion improving. AM labs reviewed. Na better.    #R gluteal abscess/cellulitis c/b MRSA bacteremia  #Chronic hyponatremia  #Schizoaffective d/o  #CVID  #HTN  #DM2    -Continue IV Vanco - dose by AUC - appreciate pharm assistance. Appreciate ID recs. Abx UNTIL 6/5.  -Cont salt tabs - no changes today.  - recs appreciated - evaluating for depot regimen.  -Regarding low normal SpO2 - etiology unclear - could be atelectasis/deconditioning. No acute findings. Lung exam benign. Encourage use of incentive spirometer. If change in status - i.e. fever, exam changes, consider additional diagnostics.   -CANNOT AMA - Dispo planning w/   - No inpatient psych facilities can accommodate pt w/ PICC. Discussed w/ psych SW. No oral option given MRSA bacteremia and potential interaction with his oral psych meds. Discussed w/ ID. Pending further psych optimization and psych clearance for dispo. Prolonged hospitalization due to no safe discharging facility at this point. Will cont to reassess daily.    Rest of plan as above.

## 2023-05-17 NOTE — DISCHARGE NOTE PROVIDER - NSDCFUADDAPPT_GEN_ALL_CORE_FT
Please follow up with the above providers upon discharge Please follow up with the above providers upon discharge. If you do not receive a call from psychiatry by the end of the week, please call the [facility] at [phone] to ensure follow-up with Dr. Cook.

## 2023-05-17 NOTE — DISCHARGE NOTE PROVIDER - NSDCMRMEDTOKEN_GEN_ALL_CORE_FT
Albuterol (Eqv-ProAir HFA) 90 mcg/inh inhalation aerosol: 2 puff(s) inhaled every 6 hours, As Needed    Last filled 3/2022  dilTIAZem 300 mg/24 hours oral capsule, extended release: 1 cap(s) orally once a day (in the morning)  Haldol Decanoate 100 mg/mL intramuscular solution: 150 milligram(s) intramuscularly every 4 weeks Next dose due 4/16/2023  HYDROXYZINE HCL 25 MG TABLET: take 2 tablets by mouth at bedtime if needed  irbesartan 300 mg oral tablet: 1 tab(s) orally once a day  levothyroxine 50 mcg (0.05 mg) oral tablet: 1 tab(s) orally once a day  metFORMIN 500 mg oral tablet: 1 tab(s) orally 2 times a day  PARoxetine mesylate 30 mg oral tablet: 2 tab(s) orally once a day  rosuvastatin 10 mg oral tablet: 1 tab(s) orally once a day (Last dispensed 12/2022 x 90 day).   Trelegy Ellipta 100 mcg-62.5 mcg-25 mcg/inh inhalation powder: 1 puff(s) inhaled once a day   Albuterol (Eqv-ProAir HFA) 90 mcg/inh inhalation aerosol: 2 puff(s) inhaled every 6 hours, As Needed    Last filled 3/2022  dilTIAZem 300 mg/24 hours oral capsule, extended release: 1 cap(s) orally once a day (in the morning)  irbesartan 300 mg oral tablet: 1 tab(s) orally once a day  levothyroxine 50 mcg (0.05 mg) oral tablet: 1 tab(s) orally once a day  metFORMIN 500 mg oral tablet: 1 tab(s) orally 2 times a day  rosuvastatin 10 mg oral tablet: 1 tab(s) orally once a day (Last dispensed 12/2022 x 90 day).   sodium chloride 1 g oral tablet: 1 tab(s) orally 3 times a day  Trelegy Ellipta 100 mcg-62.5 mcg-25 mcg/inh inhalation powder: 1 puff(s) inhaled once a day   Abilify Maintena Prefilled Syringe 300 mg intramuscular injection, extended release: 300 milligram(s) intramuscularly once a month  Albuterol (Eqv-ProAir HFA) 90 mcg/inh inhalation aerosol: 2 puff(s) inhaled every 6 hours, As Needed    Last filled 3/2022  dilTIAZem 300 mg/24 hours oral capsule, extended release: 1 cap(s) orally once a day (in the morning)  irbesartan 300 mg oral tablet: 1 tab(s) orally once a day  levothyroxine 50 mcg (0.05 mg) oral tablet: 1 tab(s) orally once a day  metFORMIN 500 mg oral tablet: 1 tab(s) orally 2 times a day  rosuvastatin 10 mg oral tablet: 1 tab(s) orally once a day (Last dispensed 12/2022 x 90 day).   sodium chloride 1 g oral tablet: 1 tab(s) orally 3 times a day  Trelegy Ellipta 100 mcg-62.5 mcg-25 mcg/inh inhalation powder: 1 puff(s) inhaled once a day

## 2023-05-17 NOTE — PROGRESS NOTE ADULT - PROBLEM SELECTOR PLAN 10
Diet: CC  DVT: lovenox  Dispo: Unable to give IV antibiotics at [facility], may need to stay inpatient until 6/5 for IV abx unless recommendation for inpatient psychiatry changes

## 2023-05-18 LAB
ALBUMIN SERPL ELPH-MCNC: 3.3 G/DL — SIGNIFICANT CHANGE UP (ref 3.3–5)
ALP SERPL-CCNC: 124 U/L — HIGH (ref 40–120)
ALT FLD-CCNC: 65 U/L — HIGH (ref 4–41)
ANION GAP SERPL CALC-SCNC: 12 MMOL/L — SIGNIFICANT CHANGE UP (ref 7–14)
AST SERPL-CCNC: 33 U/L — SIGNIFICANT CHANGE UP (ref 4–40)
BILIRUB SERPL-MCNC: 0.3 MG/DL — SIGNIFICANT CHANGE UP (ref 0.2–1.2)
BUN SERPL-MCNC: 8 MG/DL — SIGNIFICANT CHANGE UP (ref 7–23)
CALCIUM SERPL-MCNC: 9.4 MG/DL — SIGNIFICANT CHANGE UP (ref 8.4–10.5)
CHLORIDE SERPL-SCNC: 91 MMOL/L — LOW (ref 98–107)
CO2 SERPL-SCNC: 27 MMOL/L — SIGNIFICANT CHANGE UP (ref 22–31)
CREAT SERPL-MCNC: 0.67 MG/DL — SIGNIFICANT CHANGE UP (ref 0.5–1.3)
EGFR: 110 ML/MIN/1.73M2 — SIGNIFICANT CHANGE UP
GLUCOSE SERPL-MCNC: 118 MG/DL — HIGH (ref 70–99)
HCT VFR BLD CALC: 33.3 % — LOW (ref 39–50)
HGB BLD-MCNC: 11 G/DL — LOW (ref 13–17)
MAGNESIUM SERPL-MCNC: 2 MG/DL — SIGNIFICANT CHANGE UP (ref 1.6–2.6)
MCHC RBC-ENTMCNC: 26.1 PG — LOW (ref 27–34)
MCHC RBC-ENTMCNC: 33 GM/DL — SIGNIFICANT CHANGE UP (ref 32–36)
MCV RBC AUTO: 78.9 FL — LOW (ref 80–100)
NRBC # BLD: 0 /100 WBCS — SIGNIFICANT CHANGE UP (ref 0–0)
NRBC # FLD: 0 K/UL — SIGNIFICANT CHANGE UP (ref 0–0)
PHOSPHATE SERPL-MCNC: 4.3 MG/DL — SIGNIFICANT CHANGE UP (ref 2.5–4.5)
PLATELET # BLD AUTO: 327 K/UL — SIGNIFICANT CHANGE UP (ref 150–400)
POTASSIUM SERPL-MCNC: 4.4 MMOL/L — SIGNIFICANT CHANGE UP (ref 3.5–5.3)
POTASSIUM SERPL-SCNC: 4.4 MMOL/L — SIGNIFICANT CHANGE UP (ref 3.5–5.3)
PROT SERPL-MCNC: 6.3 G/DL — SIGNIFICANT CHANGE UP (ref 6–8.3)
RBC # BLD: 4.22 M/UL — SIGNIFICANT CHANGE UP (ref 4.2–5.8)
RBC # FLD: 15.5 % — HIGH (ref 10.3–14.5)
SODIUM SERPL-SCNC: 130 MMOL/L — LOW (ref 135–145)
VANCOMYCIN TROUGH SERPL-MCNC: 13.2 UG/ML — SIGNIFICANT CHANGE UP (ref 10–20)
WBC # BLD: 7.18 K/UL — SIGNIFICANT CHANGE UP (ref 3.8–10.5)
WBC # FLD AUTO: 7.18 K/UL — SIGNIFICANT CHANGE UP (ref 3.8–10.5)

## 2023-05-18 PROCEDURE — 99232 SBSQ HOSP IP/OBS MODERATE 35: CPT | Mod: GC

## 2023-05-18 PROCEDURE — 99232 SBSQ HOSP IP/OBS MODERATE 35: CPT

## 2023-05-18 RX ADMIN — Medication 166.67 MILLIGRAM(S): at 12:04

## 2023-05-18 RX ADMIN — Medication 166.67 MILLIGRAM(S): at 03:53

## 2023-05-18 RX ADMIN — Medication 2000 UNIT(S): at 12:05

## 2023-05-18 RX ADMIN — LOSARTAN POTASSIUM 100 MILLIGRAM(S): 100 TABLET, FILM COATED ORAL at 05:34

## 2023-05-18 RX ADMIN — ATORVASTATIN CALCIUM 40 MILLIGRAM(S): 80 TABLET, FILM COATED ORAL at 22:22

## 2023-05-18 RX ADMIN — SODIUM CHLORIDE 1 GRAM(S): 9 INJECTION INTRAMUSCULAR; INTRAVENOUS; SUBCUTANEOUS at 22:22

## 2023-05-18 RX ADMIN — ENOXAPARIN SODIUM 40 MILLIGRAM(S): 100 INJECTION SUBCUTANEOUS at 12:04

## 2023-05-18 RX ADMIN — SODIUM CHLORIDE 1 GRAM(S): 9 INJECTION INTRAMUSCULAR; INTRAVENOUS; SUBCUTANEOUS at 13:40

## 2023-05-18 RX ADMIN — Medication 1 APPLICATION(S): at 05:36

## 2023-05-18 RX ADMIN — SODIUM CHLORIDE 1 GRAM(S): 9 INJECTION INTRAMUSCULAR; INTRAVENOUS; SUBCUTANEOUS at 05:35

## 2023-05-18 RX ADMIN — BUDESONIDE AND FORMOTEROL FUMARATE DIHYDRATE 2 PUFF(S): 160; 4.5 AEROSOL RESPIRATORY (INHALATION) at 09:39

## 2023-05-18 RX ADMIN — BUDESONIDE AND FORMOTEROL FUMARATE DIHYDRATE 2 PUFF(S): 160; 4.5 AEROSOL RESPIRATORY (INHALATION) at 22:26

## 2023-05-18 RX ADMIN — Medication 1 APPLICATION(S): at 17:51

## 2023-05-18 RX ADMIN — Medication 300 MILLIGRAM(S): at 05:35

## 2023-05-18 RX ADMIN — ARIPIPRAZOLE 15 MILLIGRAM(S): 15 TABLET ORAL at 12:05

## 2023-05-18 NOTE — PROGRESS NOTE ADULT - PROBLEM SELECTOR PLAN 4
PT with h/o CVID requiring IgG infusions q1mo (last 4/13/2023).  OP Immunologist: Dr. Mitchell Boxer ([facility])  IGg level low at 489   s/p gammagard 75 grams on 5/8. D/w A&I fellow Dr. Kimura on 5/10, no need to re-dose with gammagard S/D at this time. Can resume usual monthly infusions after discharge.    - Allergy and Immunology recs appreciated   -recorded allergy to amoxicillin and penicillin however tolerated augmentin in the past, may re-address allergy if he needs a beta lactem

## 2023-05-18 NOTE — PROGRESS NOTE ADULT - ASSESSMENT
57 M with bipolar, schizophrenia, HTN,  bronchiectasis, CVID, hypogammaglobulinemia (monthly IVIG), previous LLE cellulitis/abscess and MRSA bacteremia, went to [facility] for R buttock wound, cellulitis but signed out AMA and was given clinda now p/w worsening pain/edema  here afebrile, WBC: 15  abd/pelvis CT: buttock cellulitis, no abscess seen  blood cx: MRSA, repeat negative 5/8  s/p wound debridement by wound care 5/10, had Necrotic dermis and subcutaneous fatty tissue Into subcutaneous tissues, cx also with MRSA  TTE unable to exclude endocarditis    leukocytosis, MRSA bacteremia due to buttock cellulitis and abscess in the setting of CVID, on monthly IVIG and previous MRSA bacteremia with abscess  repeat blood cx negative 5/8, TTE unable to exclude endocarditis   s/p I&D of necrotic skin and fatty tissue 5/10, cx with MRSA as well    Recommend:  * Continue vanco to 1250 mg IV q 8 hours (based on AUC/HATTIE calculation)   * monitor renal function to prevent nephrotoxicity  * f/u with wound care  * will need a 4 week course of vanco post negative blood cx through 6/5  * weekly CBC, CMP, vanco trough while on antibiotics     Jose Faye MD  Available through MS Teams  If no response, or after 5pm/weekends, call [phone]

## 2023-05-18 NOTE — PROGRESS NOTE ADULT - PROBLEM SELECTOR PLAN 10
Diet: CC  DVT: lovenox  Dispo: Unable to give IV antibiotics at [facility], may need to stay inpatient until 6/5 for IV abx unless recommendation for inpatient psychiatry changes

## 2023-05-18 NOTE — PROGRESS NOTE ADULT - ATTENDING COMMENTS
57M with PMH of schizoaffective d/o, CVID, HTN, DM2, hx of MRSA bacteremia likely in setting of LLE cellulitis who presents to the hospital w/ R gluteal drainage concerning for cellulitis abscess. Currently on empiric abx. Surgery on board. ID/derm/allergy, , immunology on board as well. For further management. Infectious workup now revealing MRSA bacteremia, likely related to his buttock abscess. Clinically stable on IV abx. Pending further wound care/improvement. Challenging dispo as inpt psych cannot accept pt w/ PICC line. Pt is pending further psych optimization prior to clearance from DC from hospital.    Pt seen at bedside. Feels well. No new complaints. Exam unchanged. Labs notable for Na improving to 130.    #MRSA right gluteal abscess/cellulitis c/b MRSA bacteremia  #Chronic hyponatremia 2’ SIADH  #Schizoaffective d/o  #CVID  #HTN  #DM2    -Continue IV Vanco - dose by AUC - appreciate pharm assistance. Appreciate ID recs. Abx UNTIL 6/5.  -Cont salt tabs - no changes today.  - recs appreciated - evaluating for depot regimen.  -Regarding low normal SpO2 - etiology unclear - could be atelectasis/deconditioning. No acute findings. Lung exam benign. Encourage use of incentive spirometer. If change in status - i.e. fever, exam changes, consider additional diagnostics.   -CANNOT AMA - Dispo planning w/   - No inpatient psych facilities can accommodate pt w/ PICC. Discussed w/ psych SW. No oral option given MRSA bacteremia and potential interaction with his oral psych meds. Discussed w/ ID. Pending further psych optimization and psych clearance for dispo. Prolonged hospitalization due to no safe discharging facility at this point. Will cont to reassess daily.    Rest of plan as above.

## 2023-05-18 NOTE — PROGRESS NOTE ADULT - SUBJECTIVE AND OBJECTIVE BOX
CC: Patient is a 57y old  Male who presents with a chief complaint of Gluteal wound (12 May 2023 07:50)    ID following for MRSA bacteremia, L gluteal wound    Interval History/ROS: Patient has no complaints. No fevers, no chills.     Rest of ROS negative.    Allergies  amoxicillin (Fever)  penicillin (Rash)    ANTIMICROBIALS:  vancomycin  IVPB 1250 every 8 hours    OTHER MEDS:  acetaminophen     Tablet .. 975 milliGRAM(s) Oral every 6 hours PRN  albuterol    90 MICROgram(s) HFA Inhaler 2 Puff(s) Inhalation every 6 hours PRN  ARIPiprazole 15 milliGRAM(s) Oral daily  atorvastatin 40 milliGRAM(s) Oral at bedtime  budesonide 160 MICROgram(s)/formoterol 4.5 MICROgram(s) Inhaler 2 Puff(s) Inhalation two times a day  cholecalciferol 2000 Unit(s) Oral daily  Dakins Solution - 1/4 Strength 1 Application(s) Topical two times a day  diltiazem    milliGRAM(s) Oral daily  enoxaparin Injectable 40 milliGRAM(s) SubCutaneous every 24 hours  lidocaine   4% Patch 1 Patch Transdermal every 24 hours  losartan 100 milliGRAM(s) Oral daily  OLANZapine 5 milliGRAM(s) Oral every 6 hours PRN  sodium chloride 1 Gram(s) Oral three times a day    PE:    Vital Signs Last 24 Hrs  T(C): 36.8 (18 May 2023 12:49), Max: 36.9 (18 May 2023 04:35)  T(F): 98.2 (18 May 2023 12:49), Max: 98.4 (18 May 2023 04:35)  HR: 69 (18 May 2023 12:49) (69 - 83)  BP: 121/75 (18 May 2023 12:49) (121/75 - 152/79)  BP(mean): --  RR: 18 (18 May 2023 12:49) (18 - 18)  SpO2: 94% (18 May 2023 12:49) (88% - 97%)    Parameters below as of 18 May 2023 12:49  Patient On (Oxygen Delivery Method): room air    Gen: AOx3, NAD  CV: S1+S2 normal, no murmurs  Resp: Clear bilat, no resp distress  Abd: Soft, nontender, +BS  Ext: No LE edema, no wounds  : No Perez  IV/Skin: No thrombophlebitis, R buttock wound without purulent drainage  Neuro: no focal deficits    LABS:                          11.0   7.18  )-----------( 327      ( 18 May 2023 06:04 )             33.3       05-18    130<L>  |  91<L>  |  8   ----------------------------<  118<H>  4.4   |  27  |  0.67    Ca    9.4      18 May 2023 06:04  Phos  4.3     05-18  Mg     2.00     05-18    TPro  6.3  /  Alb  3.3  /  TBili  0.3  /  DBili  x   /  AST  33  /  ALT  65<H>  /  AlkPhos  124<H>  05-18          MICROBIOLOGY:  v  .Blood Blood-Peripheral  05-10-23   No Growth Final  --  --      .Blood Blood-Peripheral  05-10-23   No Growth Final  --  --      .Abscess right buttock  05-09-23   Moderate Methicillin Resistant Staphylococcus aureus  --  Methicillin resistant Staphylococcus aureus      .Blood Blood-Peripheral  05-08-23   No Growth Final  --  --      .Blood Blood-Peripheral  05-08-23   No Growth Final  --  --      Clean Catch Clean Catch (Midstream)  05-06-23   No growth  --  --      .Blood Blood-Peripheral  05-06-23   Growth in aerobic bottle: Methicillin Resistant Staphylococcus aureus  ***Blood Panel PCR results on this specimen are available  approximately 3 hours after the Gram stain result.***  Gram stain, PCR, and/or culture results may not always  correspond due to difference in methodologies.  ************************************************************  This PCR assay was performed by multiplex PCR. This  Assay tests for 66 bacterial and resistance gene targets.  Please refer to the [facility] test directory  at [email or web address] for details.  --  Blood Culture PCR  Methicillin resistant Staphylococcus aureus      .Blood Blood-Peripheral  05-06-23   No Growth Final  --  --    RADIOLOGY:    < from: CT Pelvis w/ IV Cont (05.06.23 @ 21:22) >  IMPRESSION:  Moderate subcutaneous inflammatory change and skin thickening in the   right buttocks suggestive of a cellulitis. No associated rim-enhancing   fluid collection to suggest an abscess. No subcutaneous gas.    < end of copied text >

## 2023-05-18 NOTE — PROGRESS NOTE ADULT - SUBJECTIVE AND OBJECTIVE BOX
Hao Us MD PGY-3  Internal Medicine Resident    CHIEF COMPLAINT: Patient is a 57y old  Male who presents with a chief complaint of Gluteal wound (12 May 2023 07:50)      INTERVAL HPI/OVERNIGHT EVENTS: DAYANA ON, pt relays feeling generally well this AM, denies complaints.     MEDICATIONS (STANDING):  ARIPiprazole 15 milliGRAM(s) Oral daily  atorvastatin 40 milliGRAM(s) Oral at bedtime  budesonide 160 MICROgram(s)/formoterol 4.5 MICROgram(s) Inhaler 2 Puff(s) Inhalation two times a day  cholecalciferol 2000 Unit(s) Oral daily  Dakins Solution - 1/4 Strength 1 Application(s) Topical two times a day  diltiazem    milliGRAM(s) Oral daily  enoxaparin Injectable 40 milliGRAM(s) SubCutaneous every 24 hours  lidocaine   4% Patch 1 Patch Transdermal every 24 hours  losartan 100 milliGRAM(s) Oral daily  sodium chloride 1 Gram(s) Oral three times a day  vancomycin  IVPB 1250 milliGRAM(s) IV Intermittent every 8 hours    MEDICATIONS  (PRN):  acetaminophen     Tablet .. 975 milliGRAM(s) Oral every 6 hours PRN  albuterol    90 MICROgram(s) HFA Inhaler 2 Puff(s) Inhalation every 6 hours PRN  OLANZapine 5 milliGRAM(s) Oral every 6 hours PRN      REVIEW OF SYSTEMS:  CONSTITUTIONAL: No fever or chills  EYES: No visual disturbances or eye pain  ENMT: No sinus or throat pain  RESPIRATORY: No shortness of breath or cough  CARDIOVASCULAR: No chest pain or dizziness  GASTROINTESTINAL: No abdominal or epigastric pain. No diarrhea or constipation. No melena or hematochezia.   GENITOURINARY: No dysuria or hematuria  NEUROLOGICAL: No headaches, loss of strength or numbness  MUSCULOSKELETAL: No joint pain or swelling; No muscle, back, or extremity pain      T(F): 98.4 (05-18-23 @ 04:35), Max: 99.1 (05-17-23 @ 11:37)  HR: 83 (05-18-23 @ 04:35) (74 - 83)  BP: 152/79 (05-18-23 @ 04:35) (136/79 - 152/79)  RR: 18 (05-18-23 @ 04:35) (18 - 18)  SpO2: 95% (05-18-23 @ 04:35) (88% - 97%)  Wt(kg): --  CAPILLARY BLOOD GLUCOSE        I&O's Summary    17 May 2023 07:01  -  18 May 2023 07:00  --------------------------------------------------------  IN: 490 mL / OUT: 2250 mL / NET: -1760 mL        PHYSICAL EXAM:  GENERAL: NAD, well-groomed, well-developed, pleasant to interview  HEAD: Atraumatic, Normocephalic  EYES: PERRL, conjunctiva and sclera clear  ENMT: No tonsillar erythema, exudates, or enlargement; Moist mucous membranes  NECK: Supple  NERVOUS SYSTEM: Alert & Oriented X3, Good concentration; Motor Strength 5/5 B/L upper and lower extremities  CHEST/LUNG: Clear to auscultation bilaterally; No rales, rhonchi, wheezing, or rubs +diminished sounds at bases  HEART: Regular rate and rhythm; No murmurs, rubs, or gallops  ABDOMEN: Soft, Nontender, Nondistended; Bowel sounds present. No guarding, rebound tenderness, or rigidity.  EXTREMITIES: 2+ Peripheral Pulses, No edema  SKIN: +R gluteal wound w/ overlying dressing not saturated/ w/o overt bleed/purulence      LABS:                        11.0   7.18  )-----------( 327      ( 18 May 2023 06:04 )             33.3     05-18    130<L>  |  91<L>  |  8   ----------------------------<  118<H>  4.4   |  27  |  0.67    Ca    9.4      18 May 2023 06:04  Phos  4.3     05-18  Mg     2.00     05-18    TPro  6.3  /  Alb  3.3  /  TBili  0.3  /  DBili  x   /  AST  33  /  ALT  65<H>  /  AlkPhos  124<H>  05-18            RADIOLOGY & ADDITIONAL TESTS:

## 2023-05-19 PROCEDURE — 99232 SBSQ HOSP IP/OBS MODERATE 35: CPT | Mod: GC

## 2023-05-19 RX ORDER — ARIPIPRAZOLE 15 MG/1
300 TABLET ORAL ONCE
Refills: 0 | Status: COMPLETED | OUTPATIENT
Start: 2023-05-23 | End: 2023-05-23

## 2023-05-19 RX ADMIN — LOSARTAN POTASSIUM 100 MILLIGRAM(S): 100 TABLET, FILM COATED ORAL at 06:23

## 2023-05-19 RX ADMIN — SODIUM CHLORIDE 1 GRAM(S): 9 INJECTION INTRAMUSCULAR; INTRAVENOUS; SUBCUTANEOUS at 06:23

## 2023-05-19 RX ADMIN — ATORVASTATIN CALCIUM 40 MILLIGRAM(S): 80 TABLET, FILM COATED ORAL at 21:35

## 2023-05-19 RX ADMIN — ARIPIPRAZOLE 15 MILLIGRAM(S): 15 TABLET ORAL at 12:07

## 2023-05-19 RX ADMIN — Medication 166.67 MILLIGRAM(S): at 12:08

## 2023-05-19 RX ADMIN — Medication 1 APPLICATION(S): at 21:57

## 2023-05-19 RX ADMIN — Medication 1 APPLICATION(S): at 09:19

## 2023-05-19 RX ADMIN — Medication 300 MILLIGRAM(S): at 06:23

## 2023-05-19 RX ADMIN — Medication 166.67 MILLIGRAM(S): at 21:34

## 2023-05-19 RX ADMIN — ENOXAPARIN SODIUM 40 MILLIGRAM(S): 100 INJECTION SUBCUTANEOUS at 12:08

## 2023-05-19 RX ADMIN — SODIUM CHLORIDE 1 GRAM(S): 9 INJECTION INTRAMUSCULAR; INTRAVENOUS; SUBCUTANEOUS at 21:35

## 2023-05-19 RX ADMIN — SODIUM CHLORIDE 1 GRAM(S): 9 INJECTION INTRAMUSCULAR; INTRAVENOUS; SUBCUTANEOUS at 12:09

## 2023-05-19 RX ADMIN — Medication 2000 UNIT(S): at 12:07

## 2023-05-19 NOTE — PROGRESS NOTE ADULT - SUBJECTIVE AND OBJECTIVE BOX
Hao Us MD PGY-3  Internal Medicine Resident    CHIEF COMPLAINT: Patient is a 57y old  Male who presents with a chief complaint of Gluteal wound (12 May 2023 07:50)      INTERVAL HPI/OVERNIGHT EVENTS: DAYANA ON, pt assessed at bedside relays feeling generally well, denies complaints.     MEDICATIONS (STANDING):  ARIPiprazole 15 milliGRAM(s) Oral daily  atorvastatin 40 milliGRAM(s) Oral at bedtime  budesonide 160 MICROgram(s)/formoterol 4.5 MICROgram(s) Inhaler 2 Puff(s) Inhalation two times a day  cholecalciferol 2000 Unit(s) Oral daily  Dakins Solution - 1/4 Strength 1 Application(s) Topical two times a day  diltiazem    milliGRAM(s) Oral daily  enoxaparin Injectable 40 milliGRAM(s) SubCutaneous every 24 hours  lidocaine   4% Patch 1 Patch Transdermal every 24 hours  losartan 100 milliGRAM(s) Oral daily  sodium chloride 1 Gram(s) Oral three times a day  vancomycin  IVPB 1250 milliGRAM(s) IV Intermittent every 8 hours    MEDICATIONS  (PRN):  acetaminophen     Tablet .. 975 milliGRAM(s) Oral every 6 hours PRN  albuterol    90 MICROgram(s) HFA Inhaler 2 Puff(s) Inhalation every 6 hours PRN  OLANZapine 5 milliGRAM(s) Oral every 6 hours PRN      REVIEW OF SYSTEMS:  CONSTITUTIONAL: No fever or chills  EYES: No visual disturbances or eye pain  ENMT: No sinus or throat pain  RESPIRATORY: No shortness of breath or cough  CARDIOVASCULAR: No chest pain or dizziness  GASTROINTESTINAL: No abdominal or epigastric pain. No diarrhea or constipation. No melena or hematochezia.   GENITOURINARY: No dysuria or hematuria  NEUROLOGICAL: No headaches, loss of strength or numbness  MUSCULOSKELETAL: No joint pain or swelling; No muscle, back, or extremity pain      T(F): 97.5 (05-18-23 @ 22:07), Max: 98.2 (05-18-23 @ 12:49)  HR: 58 (05-18-23 @ 22:07) (58 - 69)  BP: 144/64 (05-18-23 @ 22:07) (121/75 - 144/64)  RR: 18 (05-18-23 @ 22:07) (18 - 18)  SpO2: 96% (05-18-23 @ 22:07) (94% - 96%)  Wt(kg): --  CAPILLARY BLOOD GLUCOSE        I&O's Summary      PHYSICAL EXAM:  GENERAL: NAD, well-groomed, well-developed, pleasant to interview  HEAD: Atraumatic, Normocephalic  EYES: PERRL, conjunctiva and sclera clear  ENMT: No tonsillar erythema, exudates, or enlargement; Moist mucous membranes  NECK: Supple  NERVOUS SYSTEM: Alert & Oriented X3, Good concentration; Motor Strength 5/5 B/L upper and lower extremities  CHEST/LUNG: Clear to auscultation bilaterally; No rales, rhonchi, wheezing, or rubs   HEART: Regular rate and rhythm; No murmurs, rubs, or gallops  ABDOMEN: Soft, Nontender, Nondistended; Bowel sounds present. No guarding, rebound tenderness, or rigidity.  EXTREMITIES: 2+ Peripheral Pulses, No edema  SKIN: +R gluteal wound w/ overlying dressing w/ small amt blood though not saturating dressing         LABS:                        11.0   7.18  )-----------( 327      ( 18 May 2023 06:04 )             33.3     05-18    130<L>  |  91<L>  |  8   ----------------------------<  118<H>  4.4   |  27  |  0.67    Ca    9.4      18 May 2023 06:04  Phos  4.3     05-18  Mg     2.00     05-18    TPro  6.3  /  Alb  3.3  /  TBili  0.3  /  DBili  x   /  AST  33  /  ALT  65<H>  /  AlkPhos  124<H>  05-18            RADIOLOGY & ADDITIONAL TESTS:

## 2023-05-19 NOTE — PROGRESS NOTE ADULT - PROBLEM SELECTOR PLAN 10
Diet: CC  DVT: lovenox  Dispo: Unable to give IV antibiotics at [facility], may need to stay inpatient until 6/5 for IV abx unless recommendation for inpatient psychiatry changes

## 2023-05-19 NOTE — PROVIDER CONTACT NOTE (OTHER) - SITUATION
Pt BP is 186/97 and 
Pt had NO IV ACCESS
Vancomycin trough level was ordered as Vancomycin random.
/83. Pt c/o right thigh pain, PRN oxycodone given for pain.

## 2023-05-19 NOTE — CHART NOTE - NSCHARTNOTEFT_GEN_A_CORE
Source: Patient [x]    Family [ ]     other [x ] Chart Review    Current Diet : Diet, Consistent Carbohydrate w/Evening Snack:   1000mL Fluid Restriction ([id number]) (05-15-23 @ 11:05)    PO intake:  % [x ]   Height (cm): 188 (07 May 2023 02:00)  Weight (kg): 114.9kg (5/17), 136.6kg (5/7)  BMI (kg/m2): 32.5(5/17/2023)     Nutrition Note:  Mr. 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     Patient is seen for nutrition follow-up. Patient reports good appetite, reports consuming >% of meals. Patient denies any difficulty chewing/ swallowing. Denies any GI distress during visit, reports feeling nauseous yesterday, but resolved. Last bowel movement 5/19/2023, per RN flow sheet. Noted patient has weight change of -21.7kg/-15.8%BW x 10days(?), might 2/2 mechanical error. Recommend re-weigh, to monitor weight trend. Noted patient previously had 1+ edema to right buttock, weight shift could also contribute to weight changes. Will continue to monitor weight trend. As per wound care note dated 5/16/2023, patient has atypical wound with abscess in right buttock, region chronic wound to L lateral lower leg gaiter. Food preference obtained during visit. Patient has no nutritional related questions/concerns at this time.       __________________ Pertinent Medications__________________   MEDICATIONS  (STANDING):  ARIPiprazole 15 milliGRAM(s) Oral daily  atorvastatin 40 milliGRAM(s) Oral at bedtime  budesonide 160 MICROgram(s)/formoterol 4.5 MICROgram(s) Inhaler 2 Puff(s) Inhalation two times a day  cholecalciferol 2000 Unit(s) Oral daily  Dakins Solution - 1/4 Strength 1 Application(s) Topical two times a day  diltiazem    milliGRAM(s) Oral daily  enoxaparin Injectable 40 milliGRAM(s) SubCutaneous every 24 hours  lidocaine   4% Patch 1 Patch Transdermal every 24 hours  losartan 100 milliGRAM(s) Oral daily  sodium chloride 1 Gram(s) Oral three times a day  vancomycin  IVPB 1250 milliGRAM(s) IV Intermittent every 8 hours    MEDICATIONS  (PRN):  acetaminophen     Tablet .. 975 milliGRAM(s) Oral every 6 hours PRN Temp greater or equal to 38C (100.4F), Mild Pain (1 - 3), Moderate Pain (4 - 6), Severe Pain (7 - 10)  albuterol    90 MICROgram(s) HFA Inhaler 2 Puff(s) Inhalation every 6 hours PRN Shortness of Breath and/or Wheezing  OLANZapine 5 milliGRAM(s) Oral every 6 hours PRN Agitation    __________________ Pertinent Labs__________________   05-18 Na130 mmol/L<L> Glu 118 mg/dL<H> K+ 4.4 mmol/L Cr  0.67 mg/dL BUN 8 mg/dL 05-18 Phos 4.3 mg/dL 05-18 Alb 3.3 g/dL    Estimated Needs:   [x ] no change since previous assessment    Previous Nutrition Diagnosis:   [x ] Altered nutrition related labs  Nutrition Diagnosis is [x ] ongoing   New Nutrition Diagnosis: [x ] not applicable    Interventions:   Recommend  [x ] Continue with current diet, diet remains appropriate at this time.   [x] Consider adding multivitamin, for micronutrient coverage.   [x] Obtain re-weigh, to monitor weight trend.      Monitoring and Evaluation:   [x ] PO intake [x ] Tolerance to diet prescription [x ] weights [x ] follow up per protocol  [x ] other: labs, bowel movement, skin integrity.
Patient amenable to receive BELLA formulation of aripiprazole. Will determine appropriate formulation (most likely Abilify Maintena). Communicated w/ pharmacy. BELLA Abilify not on formulary at [facility]. Prescription can be sent to [facility]. BELLA can then be picked up and administered on the floors.     plan:  - psych will determine best BELLA option  - once determined, primary team to send prescription to [facility] and administer on medical floors

## 2023-05-19 NOTE — PROVIDER CONTACT NOTE (OTHER) - REASON
Vancomycin trough level was ordered as Vancomycin random.
/83, MD notified per parameters.
NO IV ACCESS
Pt BP is 186/97 and

## 2023-05-19 NOTE — PROVIDER CONTACT NOTE (OTHER) - ASSESSMENT
Pt BP is 186/97 and . Pt denies dizziness, headache and chest pain.
Vital signs: /83, HR 95, RR 19, SpO2 93%, Temp 97.5F.
Pt has no IV access. Nurse manager and two nurses attempted IV. Pt refuses to get stuck unless stuck under ultrasound.

## 2023-05-19 NOTE — PROVIDER CONTACT NOTE (OTHER) - BACKGROUND
Pt on contact for MRSA. On Vancomycin. RN to draw trough before 5th dose but Vanco Random ordered. RN want to know if she can still administer.
Pt admitted for cellulitis of buttock.
Pt admitted with cellulitis and has pmhx of DM and HTN.
Pt admitted for noninfectious gastroenteritis; hx of colon cancer

## 2023-05-19 NOTE — PROGRESS NOTE ADULT - ATTENDING COMMENTS
57M with PMH of schizoaffective d/o, CVID, HTN, DM2, hx of MRSA bacteremia likely in setting of LLE cellulitis who presents to the hospital w/ R gluteal drainage concerning for cellulitis abscess. Currently on empiric abx. Surgery on board. ID/derm/allergy, BH, immunology on board as well. For further management. Infectious workup now revealing MRSA bacteremia, likely related to his buttock abscess. Clinically stable on IV abx. Pending further wound care/improvement. Challenging dispo as inpt psych cannot accept pt w/ PICC line. Pt is pending further psych optimization prior to clearance from DC from hospital. Course otherwise uneventful.     Pt seen at bedside. Feels well. No new complaints. Exam unchanged. No labs today.     #MRSA right gluteal abscess/cellulitis c/b MRSA bacteremia  #Chronic hyponatremia 2’ SIADH  #Schizoaffective d/o  #CVID  #HTN  #DM2     -Continue IV Vanco - dose by AUC - appreciate pharm assistance. Appreciate ID recs. Abx UNTIL 6/5. Lost IV access- would benefit from midline at this point.  -Cont salt tabs - no changes today.  - recs appreciated - evaluating for depot regimen.   -CANNOT AMA - Dispo planning w/   - No inpatient psych facilities can accommodate pt w/ PICC. Discussed w/ psych SW. No oral option given MRSA bacteremia and potential interaction with his oral psych meds. Discussed w/ ID. Pending further psych optimization and psych clearance for dispo. Prolonged hospitalization due to no safe discharging facility at this point. Will cont to reassess daily.     Rest of plan as above.

## 2023-05-19 NOTE — PROVIDER CONTACT NOTE (OTHER) - ACTION/TREATMENT ORDERED:
MD brumfield
MD made aware. Provide patient with an incentive spirometer.
MD to change order. No need to administer old order
No interventions ordered at this time

## 2023-05-19 NOTE — PROGRESS NOTE ADULT - PROBLEM SELECTOR PLAN 4
PT with h/o CVID requiring IgG infusions q1mo (last 4/13/2023).  OP Immunologist: Dr. Mitchell Boxer ([facility])  IGg level low at 489   s/p gammagard 75 grams on 5/8. D/w A&I fellow Dr. Kimura on 5/10, no need to re-dose with gammagard S/D at this time. Can resume usual monthly infusions after discharge.    - Allergy and Immunology recs appreciated   -recorded allergy to amoxicillin and penicillin however tolerated augmentin in the past, may re-address allergy if he needs a beta lactem

## 2023-05-20 LAB — VANCOMYCIN TROUGH SERPL-MCNC: 12 UG/ML — SIGNIFICANT CHANGE UP (ref 10–20)

## 2023-05-20 PROCEDURE — 99232 SBSQ HOSP IP/OBS MODERATE 35: CPT | Mod: GC

## 2023-05-20 RX ORDER — ARIPIPRAZOLE 15 MG/1
300 TABLET ORAL
Qty: 1 | Refills: 0
Start: 2023-05-20

## 2023-05-20 RX ORDER — VANCOMYCIN HCL 1 G
1500 VIAL (EA) INTRAVENOUS EVERY 8 HOURS
Refills: 0 | Status: DISCONTINUED | OUTPATIENT
Start: 2023-05-20 | End: 2023-05-24

## 2023-05-20 RX ADMIN — SODIUM CHLORIDE 1 GRAM(S): 9 INJECTION INTRAMUSCULAR; INTRAVENOUS; SUBCUTANEOUS at 05:53

## 2023-05-20 RX ADMIN — Medication 300 MILLIGRAM(S): at 05:54

## 2023-05-20 RX ADMIN — ATORVASTATIN CALCIUM 40 MILLIGRAM(S): 80 TABLET, FILM COATED ORAL at 22:05

## 2023-05-20 RX ADMIN — Medication 300 MILLIGRAM(S): at 22:05

## 2023-05-20 RX ADMIN — SODIUM CHLORIDE 1 GRAM(S): 9 INJECTION INTRAMUSCULAR; INTRAVENOUS; SUBCUTANEOUS at 22:06

## 2023-05-20 RX ADMIN — LOSARTAN POTASSIUM 100 MILLIGRAM(S): 100 TABLET, FILM COATED ORAL at 05:54

## 2023-05-20 RX ADMIN — ENOXAPARIN SODIUM 40 MILLIGRAM(S): 100 INJECTION SUBCUTANEOUS at 12:31

## 2023-05-20 RX ADMIN — Medication 166.67 MILLIGRAM(S): at 05:40

## 2023-05-20 RX ADMIN — BUDESONIDE AND FORMOTEROL FUMARATE DIHYDRATE 2 PUFF(S): 160; 4.5 AEROSOL RESPIRATORY (INHALATION) at 22:43

## 2023-05-20 RX ADMIN — Medication 166.67 MILLIGRAM(S): at 12:49

## 2023-05-20 RX ADMIN — Medication 2000 UNIT(S): at 12:31

## 2023-05-20 RX ADMIN — Medication 1 APPLICATION(S): at 18:40

## 2023-05-20 RX ADMIN — SODIUM CHLORIDE 1 GRAM(S): 9 INJECTION INTRAMUSCULAR; INTRAVENOUS; SUBCUTANEOUS at 14:54

## 2023-05-20 RX ADMIN — ARIPIPRAZOLE 15 MILLIGRAM(S): 15 TABLET ORAL at 12:31

## 2023-05-20 RX ADMIN — Medication 1 APPLICATION(S): at 05:57

## 2023-05-20 NOTE — PROGRESS NOTE ADULT - PROBLEM SELECTOR PLAN 4
PT with h/o CVID requiring IgG infusions q1mo (last 4/13/2023).  OP Immunologist: Dr. Mitchell Boxer ([facility])  IGg level low at 489   s/p gammagard 75 grams on 5/8. D/w A&I fellow Dr. Kimura on 5/10, no need to re-dose with gammagard S/D at this time. Can resume usual monthly infusions after discharge.    - Allergy and Immunology recs appreciated   -recorded allergy to amoxicillin and penicillin however tolerated augmentin in the past, may re-address allergy if he needs a beta lactem

## 2023-05-20 NOTE — PROGRESS NOTE ADULT - PROBLEM SELECTOR PLAN 2
Historically diagnosed. Pt confirms schizophrenia but denies being on any active psychotropic medications. Denies AH/VH. Denies SI/HI. Does not specify why he left AMA from other hospital, but does understand that he needs help here. historically lacked capacity to leave AMA, but not trying at this time.  - No need for 1:1 at this time  - Psychiatry/ Consult recs appreciated  - c/w aripiprazole for psychosis/mood,  ordered BELLA abilify maintena 300mg Qmo to be initiated Tue 5/23 (f/u  further for logistics)   - C/W zyrprexa 5 PO for agitation  - Per , pt cannot leave AMA at this time.  plans for inpatient psych after medically cleared

## 2023-05-20 NOTE — PROGRESS NOTE ADULT - PROBLEM SELECTOR PLAN 10
Diet: CC  DVT: lovenox  Dispo: Unable to give IV antibiotics at [facility], may need to stay inpatient until 6/5 for IV abx unless recommendation for inpatient psychiatry changes

## 2023-05-20 NOTE — PROGRESS NOTE ADULT - SUBJECTIVE AND OBJECTIVE BOX
Hao Us MD PGY-3  Internal Medicine Resident    CHIEF COMPLAINT: Patient is a 57y old  Male who presents with a chief complaint of Gluteal wound (12 May 2023 07:50)      INTERVAL HPI/OVERNIGHT EVENTS: DAYANA ON, SpO2% 94 on 2L supplemental O2 via NC, otherwise VS grossly wnl.     MEDICATIONS (STANDING):  ARIPiprazole 15 milliGRAM(s) Oral daily  atorvastatin 40 milliGRAM(s) Oral at bedtime  budesonide 160 MICROgram(s)/formoterol 4.5 MICROgram(s) Inhaler 2 Puff(s) Inhalation two times a day  cholecalciferol 2000 Unit(s) Oral daily  Dakins Solution - 1/4 Strength 1 Application(s) Topical two times a day  diltiazem    milliGRAM(s) Oral daily  enoxaparin Injectable 40 milliGRAM(s) SubCutaneous every 24 hours  lidocaine   4% Patch 1 Patch Transdermal every 24 hours  losartan 100 milliGRAM(s) Oral daily  sodium chloride 1 Gram(s) Oral three times a day  vancomycin  IVPB 1250 milliGRAM(s) IV Intermittent every 8 hours    MEDICATIONS  (PRN):  acetaminophen     Tablet .. 975 milliGRAM(s) Oral every 6 hours PRN  albuterol    90 MICROgram(s) HFA Inhaler 2 Puff(s) Inhalation every 6 hours PRN  OLANZapine 5 milliGRAM(s) Oral every 6 hours PRN      REVIEW OF SYSTEMS:      T(F): 97.3 (05-20-23 @ 05:27), Max: 97.3 (05-19-23 @ 14:59)  HR: 66 (05-20-23 @ 05:27) (62 - 66)  BP: 121/70 (05-20-23 @ 05:27) (121/70 - 134/70)  RR: 18 (05-20-23 @ 05:27) (18 - 18)  SpO2: 94% (05-20-23 @ 05:27) (94% - 94%)  Wt(kg): --  CAPILLARY BLOOD GLUCOSE        I&O's Summary      PHYSICAL EXAM:      LABS:                  RADIOLOGY & ADDITIONAL TESTS:       Hao Us MD PGY-3  Internal Medicine Resident    CHIEF COMPLAINT: Patient is a 57y old  Male who presents with a chief complaint of Gluteal wound (12 May 2023 07:50)      INTERVAL HPI/OVERNIGHT EVENTS: DAYANA ON, SpO2% 94 on 2L supplemental O2 via NC, otherwise VS grossly wnl. Pt assessed at bedside relays feeling generally well w/ improvement in nausea, appetite, and mood, otherwise denies complaints.     MEDICATIONS (STANDING):  ARIPiprazole 15 milliGRAM(s) Oral daily  atorvastatin 40 milliGRAM(s) Oral at bedtime  budesonide 160 MICROgram(s)/formoterol 4.5 MICROgram(s) Inhaler 2 Puff(s) Inhalation two times a day  cholecalciferol 2000 Unit(s) Oral daily  Dakins Solution - 1/4 Strength 1 Application(s) Topical two times a day  diltiazem    milliGRAM(s) Oral daily  enoxaparin Injectable 40 milliGRAM(s) SubCutaneous every 24 hours  lidocaine   4% Patch 1 Patch Transdermal every 24 hours  losartan 100 milliGRAM(s) Oral daily  sodium chloride 1 Gram(s) Oral three times a day  vancomycin  IVPB 1250 milliGRAM(s) IV Intermittent every 8 hours    MEDICATIONS  (PRN):  acetaminophen     Tablet .. 975 milliGRAM(s) Oral every 6 hours PRN  albuterol    90 MICROgram(s) HFA Inhaler 2 Puff(s) Inhalation every 6 hours PRN  OLANZapine 5 milliGRAM(s) Oral every 6 hours PRN      REVIEW OF SYSTEMS:  CONSTITUTIONAL: No fever or chills  EYES: No visual disturbances or eye pain  ENMT: No sinus or throat pain  RESPIRATORY: No shortness of breath or cough  CARDIOVASCULAR: No chest pain or dizziness  GASTROINTESTINAL: No abdominal or epigastric pain. No diarrhea or constipation. No melena or hematochezia.   GENITOURINARY: No dysuria or hematuria  NEUROLOGICAL: No headaches, loss of strength or numbness  MUSCULOSKELETAL: No joint pain or swelling; No muscle, back, or extremity pain        T(F): 97.3 (05-20-23 @ 05:27), Max: 97.3 (05-19-23 @ 14:59)  HR: 66 (05-20-23 @ 05:27) (62 - 66)  BP: 121/70 (05-20-23 @ 05:27) (121/70 - 134/70)  RR: 18 (05-20-23 @ 05:27) (18 - 18)  SpO2: 94% (05-20-23 @ 05:27) (94% - 94%)  Wt(kg): --  CAPILLARY BLOOD GLUCOSE        I&O's Summary      PHYSICAL EXAM:  GENERAL: NAD, well-groomed, well-developed, pleasant to interview  HEAD: Atraumatic, Normocephalic  EYES: PERRL, conjunctiva and sclera clear  ENMT: No tonsillar erythema, exudates, or enlargement; Moist mucous membranes  NECK: Supple  NERVOUS SYSTEM: Alert & Oriented X3, Good concentration; Motor Strength 5/5 B/L upper and lower extremities  CHEST/LUNG: Clear to auscultation bilaterally; No rales, rhonchi, wheezing, or rubs   HEART: Regular rate and rhythm; No murmurs, rubs, or gallops  ABDOMEN: Soft, Nontender, Nondistended; Bowel sounds present. No guarding, rebound tenderness, or rigidity.  EXTREMITIES: 2+ Peripheral Pulses, No edema  SKIN: +R gluteal wound w/ overlying dressing non-bloody, non-purulent      LABS:                  RADIOLOGY & ADDITIONAL TESTS:

## 2023-05-20 NOTE — PROGRESS NOTE ADULT - ATTENDING COMMENTS
57M with PMH of schizoaffective d/o, CVID, HTN, DM2, hx of MRSA bacteremia likely in setting of LLE cellulitis who presents to the hospital w/ R gluteal drainage concerning for cellulitis abscess. Currently on empiric abx. Surgery on board. ID/derm/allergy, BH, immunology on board as well. For further management. Infectious workup now revealing MRSA bacteremia, likely related to his buttock abscess. Clinically stable on IV abx. Pending further wound care/improvement. Challenging dispo as inpt psych cannot accept pt w/ PICC line. Pt is pending further psych optimization prior to clearance from DC from hospital. Course otherwise uneventful.     Pt seen at bedside. Feels well. No new complaints. No NV. No labs. Exam unchanged.     #MRSA right gluteal abscess/cellulitis c/b MRSA bacteremia  #Chronic hyponatremia 2’ SIADH  #Schizoaffective d/o  #CVID  #HTN  #DM2     -Continue IV Vanco - dose by AUC - appreciate pharm assistance. Appreciate ID recs. Abx UNTIL 6/5. Lost IV access- would benefit from midline at this point.  -Cont salt tabs - no changes today. Check lab on Mon.  - recs appreciated - evaluating for depot regimen.   -CANNOT AMA - Dispo planning w/   - No inpatient psych facilities can accommodate pt w/ PICC. Discussed w/ psych SW. No oral option given MRSA bacteremia and potential interaction with his oral psych meds. Discussed w/ ID. Pending further psych optimization and psych clearance for dispo. Prolonged hospitalization due to no safe discharging facility at this point. Will cont to reassess daily.     Rest of plan as above.

## 2023-05-21 PROCEDURE — 99232 SBSQ HOSP IP/OBS MODERATE 35: CPT | Mod: GC

## 2023-05-21 RX ADMIN — SODIUM CHLORIDE 1 GRAM(S): 9 INJECTION INTRAMUSCULAR; INTRAVENOUS; SUBCUTANEOUS at 14:42

## 2023-05-21 RX ADMIN — ATORVASTATIN CALCIUM 40 MILLIGRAM(S): 80 TABLET, FILM COATED ORAL at 23:20

## 2023-05-21 RX ADMIN — Medication 2000 UNIT(S): at 11:25

## 2023-05-21 RX ADMIN — Medication 300 MILLIGRAM(S): at 14:42

## 2023-05-21 RX ADMIN — SODIUM CHLORIDE 1 GRAM(S): 9 INJECTION INTRAMUSCULAR; INTRAVENOUS; SUBCUTANEOUS at 23:20

## 2023-05-21 RX ADMIN — SODIUM CHLORIDE 1 GRAM(S): 9 INJECTION INTRAMUSCULAR; INTRAVENOUS; SUBCUTANEOUS at 06:45

## 2023-05-21 RX ADMIN — ARIPIPRAZOLE 15 MILLIGRAM(S): 15 TABLET ORAL at 11:25

## 2023-05-21 RX ADMIN — Medication 1 APPLICATION(S): at 06:46

## 2023-05-21 RX ADMIN — Medication 300 MILLIGRAM(S): at 23:19

## 2023-05-21 RX ADMIN — Medication 1 APPLICATION(S): at 18:34

## 2023-05-21 RX ADMIN — Medication 300 MILLIGRAM(S): at 06:45

## 2023-05-21 RX ADMIN — LOSARTAN POTASSIUM 100 MILLIGRAM(S): 100 TABLET, FILM COATED ORAL at 06:45

## 2023-05-21 RX ADMIN — Medication 300 MILLIGRAM(S): at 06:44

## 2023-05-21 RX ADMIN — ENOXAPARIN SODIUM 40 MILLIGRAM(S): 100 INJECTION SUBCUTANEOUS at 11:25

## 2023-05-21 NOTE — PROGRESS NOTE ADULT - SUBJECTIVE AND OBJECTIVE BOX
INCOMPLETE NOTE    Jose Emilia | PGY1| Pager: [place] [phone]) -- EDDIE ([phone])  Interval Events:    REVIEW OF SYSTEMS:  CONSTITUTIONAL: No weakness, fevers or chills  EYES/ENT: No visual changes;  No vertigo or throat pain   NECK: No pain or stiffness  RESPIRATORY: No cough, wheezing, hemoptysis; No shortness of breath  CARDIOVASCULAR: No chest pain or palpitations  GASTROINTESTINAL: No abdominal or epigastric pain. No nausea, vomiting, or hematemesis; No diarrhea or constipation. No melena or hematochezia.  GENITOURINARY: No dysuria, frequency or hematuria  NEUROLOGICAL: No numbness or weakness  SKIN: No itching, burning, rashes, or lesions   All other review of systems is negative unless indicated above.    OBJECTIVE:  ICU Vital Signs Last 24 Hrs  T(C): 36.8 (21 May 2023 06:41), Max: 36.8 (21 May 2023 06:41)  T(F): 98.2 (21 May 2023 06:41), Max: 98.2 (21 May 2023 06:41)  HR: 70 (21 May 2023 06:41) (64 - 70)  BP: 128/65 (21 May 2023 06:41) (128/65 - 152/79)  BP(mean): --  ABP: --  ABP(mean): --  RR: 18 (21 May 2023 06:41) (17 - 18)  SpO2: 96% (21 May 2023 06:41) (95% - 96%)    O2 Parameters below as of 21 May 2023 06:41  Patient On (Oxygen Delivery Method): nasal cannula  O2 Flow (L/min): 2            05-20 @ 07:01  -  05-21 @ 07:00  --------------------------------------------------------  IN: 300 mL / OUT: 2295 mL / NET: -1995 mL      CAPILLARY BLOOD GLUCOSE          PHYSICAL EXAM:  General: WN/WD NAD  Neurology: A&Ox3, nonfocal, KANG x 4  Eyes: PERRLA/ EOMI, Gross vision intact  ENT/Neck: Neck supple, trachea midline, No JVD, Gross hearing intact  Respiratory: CTA B/L, No wheezing, rales, rhonchi  CV: RRR, +S1/S2, -S3/S4, no murmurs, rubs or gallops  Abdominal: Soft, NT, ND +BS, No HSM  MSK: 5/5 strength UE/LE bilaterally  Extremities: No edema, 2+ peripheral pulses  Skin: No Rashes, Hematoma, Ecchymosis  Incisions:   Tubes:    HOSPITAL MEDICATIONS:  MEDICATIONS  (STANDING):  ARIPiprazole 15 milliGRAM(s) Oral daily  atorvastatin 40 milliGRAM(s) Oral at bedtime  budesonide 160 MICROgram(s)/formoterol 4.5 MICROgram(s) Inhaler 2 Puff(s) Inhalation two times a day  cholecalciferol 2000 Unit(s) Oral daily  Dakins Solution - 1/4 Strength 1 Application(s) Topical two times a day  diltiazem    milliGRAM(s) Oral daily  enoxaparin Injectable 40 milliGRAM(s) SubCutaneous every 24 hours  lidocaine   4% Patch 1 Patch Transdermal every 24 hours  losartan 100 milliGRAM(s) Oral daily  sodium chloride 1 Gram(s) Oral three times a day  vancomycin  IVPB 1500 milliGRAM(s) IV Intermittent every 8 hours    MEDICATIONS  (PRN):  acetaminophen     Tablet .. 975 milliGRAM(s) Oral every 6 hours PRN Temp greater or equal to 38C (100.4F), Mild Pain (1 - 3), Moderate Pain (4 - 6), Severe Pain (7 - 10)  albuterol    90 MICROgram(s) HFA Inhaler 2 Puff(s) Inhalation every 6 hours PRN Shortness of Breath and/or Wheezing  OLANZapine 5 milliGRAM(s) Oral every 6 hours PRN Agitation      LABS:    Hgb Trend: 11.0<--, 12.4<--        Creatinine Trend: 0.67<--, 0.68<--, 0.75<--, 0.83<--, 0.85<--, 0.87<--            MICROBIOLOGY:          Jose Emilia | PGY1| Pager: [place] [phone]) -- EDDIE ([phone])  Interval Events: NAEON patient is without acute complaints    OBJECTIVE:  ICU Vital Signs Last 24 Hrs  T(C): 36.8 (21 May 2023 06:41), Max: 36.8 (21 May 2023 06:41)  T(F): 98.2 (21 May 2023 06:41), Max: 98.2 (21 May 2023 06:41)  HR: 70 (21 May 2023 06:41) (64 - 70)  BP: 128/65 (21 May 2023 06:41) (128/65 - 152/79)  BP(mean): --  ABP: --  ABP(mean): --  RR: 18 (21 May 2023 06:41) (17 - 18)  SpO2: 96% (21 May 2023 06:41) (95% - 96%)    O2 Parameters below as of 21 May 2023 06:41  Patient On (Oxygen Delivery Method): nasal cannula  O2 Flow (L/min): 2            05-20 @ 07:01  -  05-21 @ 07:00  --------------------------------------------------------  IN: 300 mL / OUT: 2295 mL / NET: -1995 mL      CAPILLARY BLOOD GLUCOSE          PHYSICAL EXAM:  General: WN/WD NAD  Eyes: PERRLA/ EOMI, Gross vision intact  ENT/Neck: Neck supple, trachea midline, No JVD, Gross hearing intact  Respiratory: CTA B/L, No wheezing, rales, rhonchi  CV: RRR, +S1/S2, -S3/S4, no murmurs, rubs or gallops  Abdominal: Soft, NT, ND +BS, No HSM  MSK: 5/5 strength UE/LE bilaterally  Extremities: No edema, 2+ peripheral pulses  Skin: R Gluteal wound w/ overlying dressing; clean intact    HOSPITAL MEDICATIONS:  MEDICATIONS  (STANDING):  ARIPiprazole 15 milliGRAM(s) Oral daily  atorvastatin 40 milliGRAM(s) Oral at bedtime  budesonide 160 MICROgram(s)/formoterol 4.5 MICROgram(s) Inhaler 2 Puff(s) Inhalation two times a day  cholecalciferol 2000 Unit(s) Oral daily  Dakins Solution - 1/4 Strength 1 Application(s) Topical two times a day  diltiazem    milliGRAM(s) Oral daily  enoxaparin Injectable 40 milliGRAM(s) SubCutaneous every 24 hours  lidocaine   4% Patch 1 Patch Transdermal every 24 hours  losartan 100 milliGRAM(s) Oral daily  sodium chloride 1 Gram(s) Oral three times a day  vancomycin  IVPB 1500 milliGRAM(s) IV Intermittent every 8 hours    MEDICATIONS  (PRN):  acetaminophen     Tablet .. 975 milliGRAM(s) Oral every 6 hours PRN Temp greater or equal to 38C (100.4F), Mild Pain (1 - 3), Moderate Pain (4 - 6), Severe Pain (7 - 10)  albuterol    90 MICROgram(s) HFA Inhaler 2 Puff(s) Inhalation every 6 hours PRN Shortness of Breath and/or Wheezing  OLANZapine 5 milliGRAM(s) Oral every 6 hours PRN Agitation      LABS:    Hgb Trend: 11.0<--, 12.4<--        Creatinine Trend: 0.67<--, 0.68<--, 0.75<--, 0.83<--, 0.85<--, 0.87<--            MICROBIOLOGY:

## 2023-05-21 NOTE — PROGRESS NOTE ADULT - PROBLEM SELECTOR PLAN 1
#Cellulitis c/b Sepsis  Pt with significant right gluteal wound progressive x1 weeks. P/w tachycardia, leukocytsosis c/w sepsis. Has h/o MRSA infection, requiring long course of vancomycin in the past. Historical Derm Bx negative for PG, but if persistent with poor wound healing, may reconsider further evaluation.  hx of left   left gluteal wound requiring debridement  surgery recommends multidisciplinary consults to determine etiology prior to debridement   TTE: ef: 60% unremarkable, endocardium could not be adequately visualized  5/6 BCX: MRSA; 5/8, 5/10 BCx: NGTD   s/p cefepime   UCx negative   - s/p wound care debridement 5/10, f/u path results, will f/u if any interventions needed inpatient  - Derm recs appreciated: not likely Pyoderma Gangrenosum  - c/w vancomycin (5/7-  ) 1250 q8 hours with  AUC between 400-600; will need until 6/5  - f/u vanc trough   - f/u ID recs #Cellulitis c/b Sepsis  Pt with significant right gluteal wound progressive x1 weeks. P/w tachycardia, leukocytsosis c/w sepsis. Has h/o MRSA infection, requiring long course of vancomycin in the past. Historical Derm Bx negative for PG, but if persistent with poor wound healing, may reconsider further evaluation.  hx of left   left gluteal wound requiring debridement  surgery recommends multidisciplinary consults to determine etiology prior to debridement   TTE: ef: 60% unremarkable, endocardium could not be adequately visualized  5/6 BCX: MRSA; 5/8, 5/10 BCx: NGTD   s/p cefepime   UCx negative   - s/p wound care debridement 5/10, f/u path results, will f/u if any interventions needed inpatient  - Derm recs appreciated: not likely Pyoderma Gangrenosum  - c/w vancomycin (5/7-  ) 1500 q8 hours with  AUC between 400-600; will need until 6/5  - f/u vanc trough (A/P Pharm 1500 q8h is  5/21  - f/u ID recs

## 2023-05-21 NOTE — PROGRESS NOTE ADULT - ATTENDING COMMENTS
57M with PMH of schizoaffective d/o, CVID, HTN, DM2, hx of MRSA bacteremia likely in setting of LLE cellulitis who presents to the hospital w/ R gluteal drainage concerning for cellulitis abscess. Currently on empiric abx. Surgery on board. ID/derm/allergy, BH, immunology on board as well. For further management. Infectious workup now revealing MRSA bacteremia, likely related to his buttock abscess. Clinically stable on IV abx. Pending further wound care/improvement. Challenging dispo as inpt psych cannot accept pt w/ PICC line. Pt is pending further psych optimization prior to clearance from DC from hospital. Course otherwise uneventful.     Pt seen at bedside. Feels well. No new complaints.      #MRSA right gluteal abscess/cellulitis c/b MRSA bacteremia  #Chronic hyponatremia 2’ SIADH  #Schizoaffective d/o  #CVID  #HTN  #DM2     -Continue IV Vanco - dose by AUC - appreciate pharm assistance. Appreciate ID recs. Abx UNTIL 6/5.   -Cont salt tabs - no changes today. Check lab on Mon.  - recs appreciated - abilify depot injection planned for 5/23.  -CANNOT AMA - Dispo planning w/   - No inpatient psych facilities can accommodate pt w/ PICC. Discussed w/ psych SW. No oral option given MRSA bacteremia and potential interaction with his oral psych meds. Discussed w/ ID. Pending further psych optimization and psych clearance for dispo. Prolonged hospitalization due to no safe discharging facility at this point. Will cont to reassess daily.     Rest of plan as above.

## 2023-05-21 NOTE — PROGRESS NOTE ADULT - PROBLEM SELECTOR PLAN 10
Diet: CC  DVT: lovenox  Dispo: Unable to give IV antibiotics at [facility], may need to stay inpatient until 6/5 for IV abx unless recommendation for inpatient psychiatry changes

## 2023-05-21 NOTE — PROGRESS NOTE ADULT - PROBLEM SELECTOR PLAN 4
PT with h/o CVID requiring IgG infusions q1mo (last 4/13/2023).  OP Immunologist: Dr. Mitchell Boxer ([facility])  IGg level low at 489   s/p gammagard 75 grams on 5/8. D/w A&I fellow Dr. Kimura on 5/10, no need to re-dose with gammagard S/D at this time. Can resume usual monthly infusions after discharge.    - Allergy and Immunology recs appreciated   -recorded allergy to amoxicillin and penicillin however tolerated augmentin in the past, may re-address allergy if he needs a beta lactem

## 2023-05-22 LAB
ALBUMIN SERPL ELPH-MCNC: 3.8 G/DL — SIGNIFICANT CHANGE UP (ref 3.3–5)
ALP SERPL-CCNC: 122 U/L — HIGH (ref 40–120)
ALT FLD-CCNC: 35 U/L — SIGNIFICANT CHANGE UP (ref 4–41)
ANION GAP SERPL CALC-SCNC: 12 MMOL/L — SIGNIFICANT CHANGE UP (ref 7–14)
AST SERPL-CCNC: 16 U/L — SIGNIFICANT CHANGE UP (ref 4–40)
BILIRUB SERPL-MCNC: 0.4 MG/DL — SIGNIFICANT CHANGE UP (ref 0.2–1.2)
BUN SERPL-MCNC: 8 MG/DL — SIGNIFICANT CHANGE UP (ref 7–23)
CALCIUM SERPL-MCNC: 9.9 MG/DL — SIGNIFICANT CHANGE UP (ref 8.4–10.5)
CHLORIDE SERPL-SCNC: 94 MMOL/L — LOW (ref 98–107)
CO2 SERPL-SCNC: 28 MMOL/L — SIGNIFICANT CHANGE UP (ref 22–31)
CREAT SERPL-MCNC: 0.8 MG/DL — SIGNIFICANT CHANGE UP (ref 0.5–1.3)
EGFR: 105 ML/MIN/1.73M2 — SIGNIFICANT CHANGE UP
GLUCOSE SERPL-MCNC: 110 MG/DL — HIGH (ref 70–99)
HCT VFR BLD CALC: 37.1 % — LOW (ref 39–50)
HGB BLD-MCNC: 11.9 G/DL — LOW (ref 13–17)
MAGNESIUM SERPL-MCNC: 2 MG/DL — SIGNIFICANT CHANGE UP (ref 1.6–2.6)
MCHC RBC-ENTMCNC: 26.5 PG — LOW (ref 27–34)
MCHC RBC-ENTMCNC: 32.1 GM/DL — SIGNIFICANT CHANGE UP (ref 32–36)
MCV RBC AUTO: 82.6 FL — SIGNIFICANT CHANGE UP (ref 80–100)
NRBC # BLD: 0 /100 WBCS — SIGNIFICANT CHANGE UP (ref 0–0)
NRBC # FLD: 0 K/UL — SIGNIFICANT CHANGE UP (ref 0–0)
PHOSPHATE SERPL-MCNC: 4.9 MG/DL — HIGH (ref 2.5–4.5)
PLATELET # BLD AUTO: 337 K/UL — SIGNIFICANT CHANGE UP (ref 150–400)
POTASSIUM SERPL-MCNC: 4.2 MMOL/L — SIGNIFICANT CHANGE UP (ref 3.5–5.3)
POTASSIUM SERPL-SCNC: 4.2 MMOL/L — SIGNIFICANT CHANGE UP (ref 3.5–5.3)
PROT SERPL-MCNC: 6.7 G/DL — SIGNIFICANT CHANGE UP (ref 6–8.3)
RBC # BLD: 4.49 M/UL — SIGNIFICANT CHANGE UP (ref 4.2–5.8)
RBC # FLD: 15.9 % — HIGH (ref 10.3–14.5)
SARS-COV-2 RNA SPEC QL NAA+PROBE: SIGNIFICANT CHANGE UP
SODIUM SERPL-SCNC: 134 MMOL/L — LOW (ref 135–145)
VANCOMYCIN TROUGH SERPL-MCNC: 15.1 UG/ML — SIGNIFICANT CHANGE UP (ref 10–20)
WBC # BLD: 5.74 K/UL — SIGNIFICANT CHANGE UP (ref 3.8–10.5)
WBC # FLD AUTO: 5.74 K/UL — SIGNIFICANT CHANGE UP (ref 3.8–10.5)

## 2023-05-22 PROCEDURE — 99232 SBSQ HOSP IP/OBS MODERATE 35: CPT

## 2023-05-22 PROCEDURE — 99232 SBSQ HOSP IP/OBS MODERATE 35: CPT | Mod: GC

## 2023-05-22 RX ADMIN — Medication 300 MILLIGRAM(S): at 05:41

## 2023-05-22 RX ADMIN — SODIUM CHLORIDE 1 GRAM(S): 9 INJECTION INTRAMUSCULAR; INTRAVENOUS; SUBCUTANEOUS at 22:16

## 2023-05-22 RX ADMIN — SODIUM CHLORIDE 1 GRAM(S): 9 INJECTION INTRAMUSCULAR; INTRAVENOUS; SUBCUTANEOUS at 05:41

## 2023-05-22 RX ADMIN — Medication 300 MILLIGRAM(S): at 09:25

## 2023-05-22 RX ADMIN — Medication 300 MILLIGRAM(S): at 17:18

## 2023-05-22 RX ADMIN — Medication 1 APPLICATION(S): at 17:19

## 2023-05-22 RX ADMIN — SODIUM CHLORIDE 1 GRAM(S): 9 INJECTION INTRAMUSCULAR; INTRAVENOUS; SUBCUTANEOUS at 13:58

## 2023-05-22 RX ADMIN — ATORVASTATIN CALCIUM 40 MILLIGRAM(S): 80 TABLET, FILM COATED ORAL at 22:15

## 2023-05-22 RX ADMIN — BUDESONIDE AND FORMOTEROL FUMARATE DIHYDRATE 2 PUFF(S): 160; 4.5 AEROSOL RESPIRATORY (INHALATION) at 22:15

## 2023-05-22 RX ADMIN — ENOXAPARIN SODIUM 40 MILLIGRAM(S): 100 INJECTION SUBCUTANEOUS at 13:40

## 2023-05-22 RX ADMIN — LOSARTAN POTASSIUM 100 MILLIGRAM(S): 100 TABLET, FILM COATED ORAL at 05:41

## 2023-05-22 RX ADMIN — ARIPIPRAZOLE 15 MILLIGRAM(S): 15 TABLET ORAL at 13:40

## 2023-05-22 RX ADMIN — Medication 1 APPLICATION(S): at 05:41

## 2023-05-22 RX ADMIN — Medication 2000 UNIT(S): at 13:40

## 2023-05-22 RX ADMIN — BUDESONIDE AND FORMOTEROL FUMARATE DIHYDRATE 2 PUFF(S): 160; 4.5 AEROSOL RESPIRATORY (INHALATION) at 10:43

## 2023-05-22 NOTE — PROGRESS NOTE ADULT - ATTENDING COMMENTS
57M Schizoaffective D/O, CVID/hypogammaglobulinemia on monthly IVIG, HTN, DM2, p/w sepsis from Lt gluteal MRSA abscess/cellulitis – failed previous treatments c/b MRSA bacteremia s/p Debridement 5/10, SIADH.  - continue Vanco IV until 6/3  - patient requires inpatient psychiatric facility for management - but unable to care for patient with PICC line d/t safety  - continue to monitor clinical status.

## 2023-05-22 NOTE — BH CONSULTATION LIAISON PROGRESS NOTE - NSBHASSESSMENTFT_PSY_ALL_CORE
56M, domiciled in supportive housing TSI, SSD, single, PMH HTN, DM, hypogammaglobulinemia, PPHx schizoaffective d/o, bipolar d/o, hx of multiple psych hospitalizations (last known in 2020 at [facility]), denies hx SA/NSSIB, denies drug or alcohol use, denies legal hx, who presented with right gluteal wound, admitted to medicine for cellulutis. Psychiatry consulted for psychosis/schizophrenia.    On assessment, patient is calm and cooperative. AAO3. No overt symptoms of psychosis, depression, or yo. No S/I or H/I. Patient w/ recurrent/chronic infections. Patient has left multiple hospitals AMA while undergoing treatment for cellulitis. However, patient recognizes benefit of continuing w/ antibiotic treatment, as well as risks of discontinuing treatment. Patient not currently refusing treatment or attempting to leave AMA. Patient interested in reconnection to outpatient care after discharge. Patient was due for Haldol decanoate 150mg on 4/16. Will obtain collateral see when last received.     5/9: no interval change. no overt positive symptoms of psychosis. patient w/ likely negative/cognitive symptoms that impair ability to adequately care for self (e.g. likely contribute to poor hygiene, recurrent infections, etc.). no si or hi. interested in resuming outpatient care and restarting psychotropics. recommend aripiprazole 5mg qd for psychosis/mood (w/ reported history of depression).     5/10: status quo. no psychosis or depression. patient w/ impaired ability to care for self, likely a result of negative/cognitive symptoms. per chart review, patient has not seen outpt psychiatrist since 9/22. cannot leave AMA. will consider inpatient psych once medically cleared.     5/11: feels physically better. mood improved. no overt positive psychotic symptoms. can increase aripiprazole to 10mg qd.     5/12: minimal interval change. pleasant, calm, cooperative, likely cognitively limited due to chronic SMI. c/w aripiprazole 10mg    5/13: status quo. no mood or psychotic sx. in good behavioral control. can increase aripiprazole to 15mg.      5/22: pleasant, cooperative, adherent w/ medical and psychiatric treatment. no overt psychosis. plan for abilify injection tomorrow.     Plan:  - Routine observation, no psychiatric indication for CO 1:1  - c/w aripiprazole 15mg qd  - abilify maintenna 300mg injection TOMORROW  - REQUIRES 2 WEEKS OF ARIPIPRAZOLE 15MG QD ORAL OVERLAP  - If patient refuses treatment or attempts to leave AMA, psych can reassess for capacity   - Zyprexa 2.5-5mg PO/IM/IV q6hrs PRN for agitation  - CANNOT LEAVE AMA  - Dispo: patient requires PICC until 6/5, can consider transfer to S for completion of abx tx and optimization of psychiatric regimen

## 2023-05-22 NOTE — PROGRESS NOTE ADULT - PROBLEM SELECTOR PLAN 10
Diet: CC  DVT: lovenox  Dispo: Unable to give IV antibiotics at [facility], may need to stay inpatient until 6/5 for IV abx unless recommendation for inpatient psychiatry changes

## 2023-05-22 NOTE — BH CONSULTATION LIAISON PROGRESS NOTE - NSBHATTESTCOMMENTATTENDFT_PSY_A_CORE
Chart reviewed. Pt seen with resident. Limited, concrete, though in behavioral control, in NAD. Agree with above assessment/recs. Will continue to follow

## 2023-05-22 NOTE — PROGRESS NOTE ADULT - PROBLEM SELECTOR PLAN 4
PT with h/o CVID requiring IgG infusions q1mo (last 4/13/2023).  OP Immunologist: Dr. Mitchell Boxer ([facility])  IGg level low at 489   s/p gammagard 75 grams on 5/8. D/w A&I fellow Dr. Kimura on 5/10, no need to re-dose with gammagard S/D at this time. Can resume usual monthly infusions after discharge.    - Allergy and Immunology recs appreciated   -recorded allergy to amoxicillin and penicillin however tolerated augmentin in the past, may re-address allergy if he needs a beta lactem

## 2023-05-22 NOTE — PROGRESS NOTE ADULT - PROBLEM SELECTOR PLAN 1
#Cellulitis c/b Sepsis  Pt with significant right gluteal wound progressive x1 weeks. P/w tachycardia, leukocytsosis c/w sepsis. Has h/o MRSA infection, requiring long course of vancomycin in the past. Historical Derm Bx negative for PG, but if persistent with poor wound healing, may reconsider further evaluation.  hx of left   left gluteal wound requiring debridement  surgery recommends multidisciplinary consults to determine etiology prior to debridement   TTE: ef: 60% unremarkable, endocardium could not be adequately visualized  5/6 BCX: MRSA; 5/8, 5/10 BCx: NGTD   s/p cefepime   UCx negative   - s/p wound care debridement 5/10, f/u path results, will f/u if any interventions needed inpatient  - Derm recs appreciated: not likely Pyoderma Gangrenosum  - c/w vancomycin (5/7-  ) 1500 q8 hours with  AUC between 400-600; will need until 6/5  - f/u vanc trough (A/P Pharm 1500 q8h is  5/21  - f/u ID recs #Cellulitis c/b Sepsis  Pt with significant right gluteal wound progressive x1 weeks. P/w tachycardia, leukocytsosis c/w sepsis. Has h/o MRSA infection, requiring long course of vancomycin in the past. Historical Derm Bx negative for PG, but if persistent with poor wound healing, may reconsider further evaluation.  hx of left   left gluteal wound requiring debridement  surgery recommends multidisciplinary consults to determine etiology prior to debridement   TTE: ef: 60% unremarkable, endocardium could not be adequately visualized  5/6 BCX: MRSA; 5/8, 5/10 BCx: NGTD   s/p cefepime   UCx negative   - s/p wound care debridement 5/10, f/u path results, will f/u if any interventions needed inpatient  - Derm recs appreciated: not likely Pyoderma Gangrenosum  - c/w vancomycin (5/7-6/5)  - f/u ID recs

## 2023-05-22 NOTE — PROGRESS NOTE ADULT - PROBLEM SELECTOR PLAN 5
Pt with h/o variable hyponatremia. SOsm calc c/w hypotonic hyponatremia. Farrukh low suggestive of sodium-avid state (i.e. RAAS activation), UOsm quite low, though c/f psychogenic polydipsia vs poor solute intake.. Unclear contribution of ARB as OP as pt not filling medications consistently.   likely 2/2 SIADH as improved with fluid restriction   - Trend Na   - c/w fluid restriction to 1200 and increased to 3 salt tabs Pt with h/o variable hyponatremia. SOsm calc c/w hypotonic hyponatremia. Farrukh low suggestive of sodium-avid state (i.e. RAAS activation), UOsm quite low, though c/f psychogenic polydipsia vs poor solute intake.. Unclear contribution of ARB as OP as pt not filling medications consistently.   likely 2/2 SIADH as improved with fluid restriction   - Trend Na   - c/w fluid restriction to 1500  - c/w salt tabs

## 2023-05-22 NOTE — PROGRESS NOTE ADULT - SUBJECTIVE AND OBJECTIVE BOX
Anisha MendozaAlfaAnanda | PGY-2  Internal Medicine    OVERNIGHT EVENTS: no overnight events      SUBJECTIVE: Patient was examined at bedside this morning. Appeared comfortable. Overnight vitals and monitoring results were reviewed. Reporting no complaints. Denied chest pain, SOB, abdominal pain, vomiting, diarrhea, constipation.       MEDICATIONS  (STANDING):  ARIPiprazole 15 milliGRAM(s) Oral daily  atorvastatin 40 milliGRAM(s) Oral at bedtime  budesonide 160 MICROgram(s)/formoterol 4.5 MICROgram(s) Inhaler 2 Puff(s) Inhalation two times a day  cholecalciferol 2000 Unit(s) Oral daily  Dakins Solution - 1/4 Strength 1 Application(s) Topical two times a day  diltiazem    milliGRAM(s) Oral daily  enoxaparin Injectable 40 milliGRAM(s) SubCutaneous every 24 hours  lidocaine   4% Patch 1 Patch Transdermal every 24 hours  losartan 100 milliGRAM(s) Oral daily  sodium chloride 1 Gram(s) Oral three times a day  vancomycin  IVPB 1500 milliGRAM(s) IV Intermittent every 8 hours    MEDICATIONS  (PRN):  acetaminophen     Tablet .. 975 milliGRAM(s) Oral every 6 hours PRN Temp greater or equal to 38C (100.4F), Mild Pain (1 - 3), Moderate Pain (4 - 6), Severe Pain (7 - 10)  albuterol    90 MICROgram(s) HFA Inhaler 2 Puff(s) Inhalation every 6 hours PRN Shortness of Breath and/or Wheezing  OLANZapine 5 milliGRAM(s) Oral every 6 hours PRN Agitation        T(F): 97.9 (05-22-23 @ 05:38), Max: 98.5 (05-21-23 @ 12:43)  HR: 68 (05-22-23 @ 05:38) (61 - 68)  BP: 130/74 (05-22-23 @ 05:38) (130/74 - 148/76)  BP(mean): --  RR: 18 (05-22-23 @ 05:38) (18 - 20)  SpO2: 94% (05-22-23 @ 05:38) (94% - 97%)    PHYSICAL EXAM:     GENERAL: NAD, lying in bed comfortably  HEAD:  Atraumatic, Normocephalic  EYES: EOMI, PERRLA, conjunctiva and sclera clear, no nystagmus noted  CHEST/LUNG: Clear to auscultation bilaterally; No rales, rhonchi, wheezing, or rubs. Unlabored respirations  HEART: Regular rate and rhythm; No murmurs, rubs, or gallops, normal S1/S2  ABDOMEN: normal bowel sounds; Soft, nontender, nondistended, no organomegaly   EXTREMITIES:  2+ Peripheral Pulses, brisk capillary refill. No clubbing, cyanosis, or edema  MSK: No gross deformities noted   Neurological:  A&Ox3, no focal deficits       TELEMETRY:    LABS:                  Creatinine Trend: 0.67<--, 0.68<--, 0.75<--, 0.83<--, 0.85<--, 0.87<--  I&O's Summary    21 May 2023 07:01  -  22 May 2023 07:00  --------------------------------------------------------  IN: 480 mL / OUT: 1000 mL / NET: -520 mL      BNP    RADIOLOGY & ADDITIONAL STUDIES:             Anisha KellyAlfaAnanda | PGY-2  Internal Medicine    OVERNIGHT EVENTS: no overnight events      SUBJECTIVE: Patient was examined at bedside this morning. Appeared comfortable. Overnight vitals and monitoring results were reviewed.      MEDICATIONS  (STANDING):  ARIPiprazole 15 milliGRAM(s) Oral daily  atorvastatin 40 milliGRAM(s) Oral at bedtime  budesonide 160 MICROgram(s)/formoterol 4.5 MICROgram(s) Inhaler 2 Puff(s) Inhalation two times a day  cholecalciferol 2000 Unit(s) Oral daily  Dakins Solution - 1/4 Strength 1 Application(s) Topical two times a day  diltiazem    milliGRAM(s) Oral daily  enoxaparin Injectable 40 milliGRAM(s) SubCutaneous every 24 hours  lidocaine   4% Patch 1 Patch Transdermal every 24 hours  losartan 100 milliGRAM(s) Oral daily  sodium chloride 1 Gram(s) Oral three times a day  vancomycin  IVPB 1500 milliGRAM(s) IV Intermittent every 8 hours    MEDICATIONS  (PRN):  acetaminophen     Tablet .. 975 milliGRAM(s) Oral every 6 hours PRN Temp greater or equal to 38C (100.4F), Mild Pain (1 - 3), Moderate Pain (4 - 6), Severe Pain (7 - 10)  albuterol    90 MICROgram(s) HFA Inhaler 2 Puff(s) Inhalation every 6 hours PRN Shortness of Breath and/or Wheezing  OLANZapine 5 milliGRAM(s) Oral every 6 hours PRN Agitation        T(F): 97.9 (05-22-23 @ 05:38), Max: 98.5 (05-21-23 @ 12:43)  HR: 68 (05-22-23 @ 05:38) (61 - 68)  BP: 130/74 (05-22-23 @ 05:38) (130/74 - 148/76)  BP(mean): --  RR: 18 (05-22-23 @ 05:38) (18 - 20)  SpO2: 94% (05-22-23 @ 05:38) (94% - 97%)    PHYSICAL EXAM:     GENERAL: NAD, lying in bed comfortably  HEAD:  Atraumatic, Normocephalic  EYES: EOMI, PERRLA, conjunctiva and sclera clear, no nystagmus noted  CHEST/LUNG: Clear to auscultation bilaterally; No rales, rhonchi, wheezing, or rubs. Unlabored respirations  HEART: Regular rate and rhythm; No murmurs, rubs, or gallops, normal S1/S2  ABDOMEN: normal bowel sounds; Soft, nontender, nondistended, no organomegaly   EXTREMITIES:  2+ Peripheral Pulses, brisk capillary refill. No clubbing, cyanosis, or edema  MSK: No gross deformities noted   Neurological:  A&Ox3, no focal deficits       TELEMETRY:    LABS:                  Creatinine Trend: 0.67<--, 0.68<--, 0.75<--, 0.83<--, 0.85<--, 0.87<--  I&O's Summary    21 May 2023 07:01  -  22 May 2023 07:00  --------------------------------------------------------  IN: 480 mL / OUT: 1000 mL / NET: -520 mL      BNP    RADIOLOGY & ADDITIONAL STUDIES:

## 2023-05-23 LAB
ANION GAP SERPL CALC-SCNC: 12 MMOL/L — SIGNIFICANT CHANGE UP (ref 7–14)
BUN SERPL-MCNC: 7 MG/DL — SIGNIFICANT CHANGE UP (ref 7–23)
CALCIUM SERPL-MCNC: 9.7 MG/DL — SIGNIFICANT CHANGE UP (ref 8.4–10.5)
CHLORIDE SERPL-SCNC: 93 MMOL/L — LOW (ref 98–107)
CO2 SERPL-SCNC: 28 MMOL/L — SIGNIFICANT CHANGE UP (ref 22–31)
CREAT SERPL-MCNC: 0.79 MG/DL — SIGNIFICANT CHANGE UP (ref 0.5–1.3)
EGFR: 105 ML/MIN/1.73M2 — SIGNIFICANT CHANGE UP
GLUCOSE SERPL-MCNC: 94 MG/DL — SIGNIFICANT CHANGE UP (ref 70–99)
HCT VFR BLD CALC: 36.1 % — LOW (ref 39–50)
HGB BLD-MCNC: 11.7 G/DL — LOW (ref 13–17)
MAGNESIUM SERPL-MCNC: 2.1 MG/DL — SIGNIFICANT CHANGE UP (ref 1.6–2.6)
MCHC RBC-ENTMCNC: 26.7 PG — LOW (ref 27–34)
MCHC RBC-ENTMCNC: 32.4 GM/DL — SIGNIFICANT CHANGE UP (ref 32–36)
MCV RBC AUTO: 82.4 FL — SIGNIFICANT CHANGE UP (ref 80–100)
NRBC # BLD: 0 /100 WBCS — SIGNIFICANT CHANGE UP (ref 0–0)
NRBC # FLD: 0 K/UL — SIGNIFICANT CHANGE UP (ref 0–0)
PHOSPHATE SERPL-MCNC: 5.1 MG/DL — HIGH (ref 2.5–4.5)
PLATELET # BLD AUTO: 293 K/UL — SIGNIFICANT CHANGE UP (ref 150–400)
POTASSIUM SERPL-MCNC: 4.4 MMOL/L — SIGNIFICANT CHANGE UP (ref 3.5–5.3)
POTASSIUM SERPL-SCNC: 4.4 MMOL/L — SIGNIFICANT CHANGE UP (ref 3.5–5.3)
RBC # BLD: 4.38 M/UL — SIGNIFICANT CHANGE UP (ref 4.2–5.8)
RBC # FLD: 15.9 % — HIGH (ref 10.3–14.5)
SODIUM SERPL-SCNC: 133 MMOL/L — LOW (ref 135–145)
VANCOMYCIN TROUGH SERPL-MCNC: 16.2 UG/ML — SIGNIFICANT CHANGE UP (ref 10–20)
WBC # BLD: 5.89 K/UL — SIGNIFICANT CHANGE UP (ref 3.8–10.5)
WBC # FLD AUTO: 5.89 K/UL — SIGNIFICANT CHANGE UP (ref 3.8–10.5)

## 2023-05-23 PROCEDURE — 99232 SBSQ HOSP IP/OBS MODERATE 35: CPT

## 2023-05-23 PROCEDURE — 99232 SBSQ HOSP IP/OBS MODERATE 35: CPT | Mod: GC

## 2023-05-23 RX ADMIN — Medication 2000 UNIT(S): at 12:32

## 2023-05-23 RX ADMIN — BUDESONIDE AND FORMOTEROL FUMARATE DIHYDRATE 2 PUFF(S): 160; 4.5 AEROSOL RESPIRATORY (INHALATION) at 10:11

## 2023-05-23 RX ADMIN — SODIUM CHLORIDE 1 GRAM(S): 9 INJECTION INTRAMUSCULAR; INTRAVENOUS; SUBCUTANEOUS at 06:06

## 2023-05-23 RX ADMIN — ENOXAPARIN SODIUM 40 MILLIGRAM(S): 100 INJECTION SUBCUTANEOUS at 12:32

## 2023-05-23 RX ADMIN — ATORVASTATIN CALCIUM 40 MILLIGRAM(S): 80 TABLET, FILM COATED ORAL at 21:45

## 2023-05-23 RX ADMIN — SODIUM CHLORIDE 1 GRAM(S): 9 INJECTION INTRAMUSCULAR; INTRAVENOUS; SUBCUTANEOUS at 13:56

## 2023-05-23 RX ADMIN — LOSARTAN POTASSIUM 100 MILLIGRAM(S): 100 TABLET, FILM COATED ORAL at 06:05

## 2023-05-23 RX ADMIN — Medication 300 MILLIGRAM(S): at 12:56

## 2023-05-23 RX ADMIN — BUDESONIDE AND FORMOTEROL FUMARATE DIHYDRATE 2 PUFF(S): 160; 4.5 AEROSOL RESPIRATORY (INHALATION) at 21:45

## 2023-05-23 RX ADMIN — ARIPIPRAZOLE 15 MILLIGRAM(S): 15 TABLET ORAL at 12:32

## 2023-05-23 RX ADMIN — Medication 300 MILLIGRAM(S): at 06:06

## 2023-05-23 RX ADMIN — Medication 1 APPLICATION(S): at 06:05

## 2023-05-23 RX ADMIN — ARIPIPRAZOLE 300 MILLIGRAM(S): 15 TABLET ORAL at 12:31

## 2023-05-23 RX ADMIN — Medication 1 APPLICATION(S): at 19:09

## 2023-05-23 RX ADMIN — Medication 300 MILLIGRAM(S): at 19:43

## 2023-05-23 RX ADMIN — SODIUM CHLORIDE 1 GRAM(S): 9 INJECTION INTRAMUSCULAR; INTRAVENOUS; SUBCUTANEOUS at 21:45

## 2023-05-23 RX ADMIN — Medication 300 MILLIGRAM(S): at 01:13

## 2023-05-23 NOTE — PROGRESS NOTE ADULT - ASSESSMENT
Assessment/Plan: Mr. 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     Wound care f/u for right buttock abscess with MRSA cellulitis s/p debridement by wound care team on 5/10 and left lateral lower leg chronic wound with h/o MRSA.    Impression  - Wounds improving in dimension and tissue type  - Right buttock abscess with Cellulitis improving; fading erythema; periwound skin with immediate induration along wound wound edge. No fluctuance/crepitus/no increased warmth/no tenderness. 9 o'clock non-ulcerated slightly raised nodule remains.  - Wound cultures reviewed, (+) MRSA, abx management per primary team.  - Right buttock pathology reviewed- fragments of skin with ulceration and acut eand chronic inflammation, fragments of necrotic connective tissue with acute suppurative inflammation.     Recommendations:   -Right buttock and Left lateral leg- cleanse wounds with Dakins 1/4 strength, rinse wound and periwound skin with NS. Apply Liquid barrier film to periwound skin. Apply Aquacel Ag hydrofiber, cover with silicone foam with border. Change daily. Upon discharge may change every other day and prn  - Abx management per primary team/ID  - Smoking cessation  - Glucose control per primary team  - Appreciate nutrition recommendations; glucose control and weight loss (per patient)  - Continue low airloss support surface while inpatient.  - Encourage turning and postioning; mobility to offload pressure from lateral leg and right buttock    Upon discharge follow up at outpatient [facility]. [address] [facility]. [phone].    Discussed findings and plan with primary team, primary RN, and patient. All questions and concerns addressed to meet patient's satisfaction. Discussed smoking cession and importance of glucose control. Patient expressed wanting to change lifestyle habits including weight loss, better glucose control and smoking cessation upon discharge. Consider diabetes education, nutrition recommendations appreciated, continue education on smoking cessation.    Will continue to follow periodically throughout hospitalization, please reconsult early as needed.  Remainder of care per primary team.    MADELAINE Juarez-BC, CWN   pager #[phone]    If after 4PM or before 7:30AM on Mon-Friday or weekend/holiday please contact general surgery for urgent matters.   Team A- [id number]   Team B- [id number]  For non-urgent matters e-mail [email or web address]    I spent 25 minutes face-to-face with this patient of which more than 50% of the time was spent counseling/coordinating care of this patient.

## 2023-05-23 NOTE — PROGRESS NOTE ADULT - SUBJECTIVE AND OBJECTIVE BOX
Anisha KellyAlfaAnanda | PGY-2  Internal Medicine    OVERNIGHT EVENTS: no overnight events      SUBJECTIVE: Patient was examined at bedside this morning. Appeared comfortable. Overnight vitals and monitoring results were reviewed.      MEDICATIONS  (STANDING):  ARIPiprazole 15 milliGRAM(s) Oral daily  atorvastatin 40 milliGRAM(s) Oral at bedtime  budesonide 160 MICROgram(s)/formoterol 4.5 MICROgram(s) Inhaler 2 Puff(s) Inhalation two times a day  cholecalciferol 2000 Unit(s) Oral daily  Dakins Solution - 1/4 Strength 1 Application(s) Topical two times a day  diltiazem    milliGRAM(s) Oral daily  enoxaparin Injectable 40 milliGRAM(s) SubCutaneous every 24 hours  lidocaine   4% Patch 1 Patch Transdermal every 24 hours  losartan 100 milliGRAM(s) Oral daily  sodium chloride 1 Gram(s) Oral three times a day  vancomycin  IVPB 1500 milliGRAM(s) IV Intermittent every 8 hours    MEDICATIONS  (PRN):  acetaminophen     Tablet .. 975 milliGRAM(s) Oral every 6 hours PRN Temp greater or equal to 38C (100.4F), Mild Pain (1 - 3), Moderate Pain (4 - 6), Severe Pain (7 - 10)  albuterol    90 MICROgram(s) HFA Inhaler 2 Puff(s) Inhalation every 6 hours PRN Shortness of Breath and/or Wheezing  OLANZapine 5 milliGRAM(s) Oral every 6 hours PRN Agitation        T(F): 97.4 (05-23-23 @ 05:02), Max: 97.4 (05-23-23 @ 05:02)  HR: 75 (05-23-23 @ 06:02) (63 - 76)  BP: 130/68 (05-23-23 @ 06:02) (130/61 - 134/84)  BP(mean): --  RR: 17 (05-23-23 @ 06:02) (17 - 18)  SpO2: 92% (05-23-23 @ 06:02) (92% - 93%)    PHYSICAL EXAM:     GENERAL: NAD, lying in bed comfortably  HEAD:  Atraumatic, Normocephalic  EYES: EOMI, PERRLA, conjunctiva and sclera clear, no nystagmus noted  CHEST/LUNG: Clear to auscultation bilaterally; No rales, rhonchi, wheezing, or rubs. Unlabored respirations  HEART: Regular rate and rhythm; No murmurs, rubs, or gallops, normal S1/S2  ABDOMEN: normal bowel sounds; Soft, nontender, nondistended, no organomegaly   EXTREMITIES:  2+ Peripheral Pulses, brisk capillary refill. No clubbing, cyanosis, or edema  MSK: No gross deformities noted   Neurological:  A&Ox3, no focal deficits     TELEMETRY:    LABS:                        11.7   5.89  )-----------( 293      ( 23 May 2023 06:00 )             36.1     05-23    133<L>  |  93<L>  |  7   ----------------------------<  94  4.4   |  28  |  0.79    Ca    9.7      23 May 2023 06:00  Phos  5.1     05-23  Mg     2.10     05-23    TPro  6.7  /  Alb  3.8  /  TBili  0.4  /  DBili  x   /  AST  16  /  ALT  35  /  AlkPhos  122<H>  05-22            Creatinine Trend: 0.79<--, 0.80<--, 0.67<--, 0.68<--, 0.75<--, 0.83<--  I&O's Summary    BNP    RADIOLOGY & ADDITIONAL STUDIES:

## 2023-05-23 NOTE — PROGRESS NOTE ADULT - PROBLEM SELECTOR PLAN 5
Pt with h/o variable hyponatremia. SOsm calc c/w hypotonic hyponatremia. Farrukh low suggestive of sodium-avid state (i.e. RAAS activation), UOsm quite low, though c/f psychogenic polydipsia vs poor solute intake.. Unclear contribution of ARB as OP as pt not filling medications consistently.   likely 2/2 SIADH as improved with fluid restriction   - Trend Na   - c/w fluid restriction to 1500  - c/w salt tabs

## 2023-05-23 NOTE — PROGRESS NOTE ADULT - SUBJECTIVE AND OBJECTIVE BOX
[facility]-- WOUND TEAM -- FOLLOW UP NOTE  --------------------------------------------------------------------------------    subjective: Patient seen and examined at bedside, no new complaints. Reports adequate appetite, denies CP, SOB, fever, chills, n/v, diarrhea. Reports urinary and fecal continence. Denies pain and/or tenderness to left leg and right buttock wound; pain to right buttock wound is limited to when cleansed or when he is putting pressure on it; but tolerable. Patient reports he has a significant smoking history including tobacco and cannabis; has not smoked cannabis in many years. Last smoked tobacco immediately prior to hospitalization, denies symptoms of withdrawal.     Interval HPI/24 hour events:   No acute events    Chart reviewed including labs and relevant images      Diet:  Diet, Consistent Carbohydrate w/Evening Snack:   1500mL Fluid Restriction ([id number]) (05-22-23 @ 10:57)      ROS: General, skin see above.  All other systems negative.    ALLERGIES & MEDICATIONS  --------------------------------------------------------------------------------  Allergies    amoxicillin (Fever)  penicillin (Rash)    Intolerances          STANDING INPATIENT MEDICATIONS    ARIPiprazole 15 milliGRAM(s) Oral daily  atorvastatin 40 milliGRAM(s) Oral at bedtime  budesonide 160 MICROgram(s)/formoterol 4.5 MICROgram(s) Inhaler 2 Puff(s) Inhalation two times a day  cholecalciferol 2000 Unit(s) Oral daily  Dakins Solution - 1/4 Strength 1 Application(s) Topical two times a day  diltiazem    milliGRAM(s) Oral daily  enoxaparin Injectable 40 milliGRAM(s) SubCutaneous every 24 hours  lidocaine   4% Patch 1 Patch Transdermal every 24 hours  losartan 100 milliGRAM(s) Oral daily  sodium chloride 1 Gram(s) Oral three times a day  vancomycin  IVPB 1500 milliGRAM(s) IV Intermittent every 8 hours      PRN INPATIENT MEDICATION  acetaminophen     Tablet .. 975 milliGRAM(s) Oral every 6 hours PRN  albuterol    90 MICROgram(s) HFA Inhaler 2 Puff(s) Inhalation every 6 hours PRN  OLANZapine 5 milliGRAM(s) Oral every 6 hours PRN        Vital signs:  T(C): 36.3 (05-23-23 @ 05:02), Max: 36.3 (05-23-23 @ 05:02)  HR: 75 (05-23-23 @ 06:02) (63 - 76)  BP: 130/68 (05-23-23 @ 06:02) (130/61 - 134/84)  RR: 17 (05-23-23 @ 06:02) (17 - 18)  SpO2: 92% (05-23-23 @ 06:02) (92% - 93%)  Wt(kg): 114.9 kg (17-May-2023)        PHYSICAL EXAM  Constitutional: On contact isolation precautions.  NAD, alert and oriented x 3, lying in bed on left side.  + low airloss support surface, seat cushion on chair in room.  HEENT: NC/NT, nonicteric, mucosa moist, no teeth  Respiratory: supplemental oxygen via NC, nonlabored, equal chest expansion  Cardiac: Rate regular   Abd: Obese, soft, non-distended, non-tender.   ext: No erythema/edema. Warm, sensate. Up in room independently. Turn in bed independently    INTEGUMENT/WOUND(S):  Left buttock hypopigmentation, soft tissue contraction.  R Buttock wound with MRSA   - measuring- 3.5cmx6.5cmx0.5cm (prev 4.7cmx7.3cmx1.5cm),  Wound edge appear erosive, well defined, and irregular, with multiple immediately adjacent satellite ulcerations healing dry fibrinous pin-point crust.   - Wound base pink-red moist granular  - Small-moderate serosanguinous drainage, no odor.   - Periwound skin with mild induration immediately surrounding wound, blanching erythema resolving. Non tenderness/no crepitus/no fluctuance. Small non ulcerated, slightly raised nodular lesion at 9 o'clock 3 cm from wound edge 0.3cmx0.3cmx0.  L Lateral lower leg gaiter region chronic wound h/o MRSA  - Entire area measures 1.5cmx1.5cmx0.1cm  - 90% reepithelialized, 10% pink-moist granulation (0.5cmx0.5cmx0.1cm)    - Small serous drainage, no odor  - Periwound skin with hyperkeratosis and hyperpigmentation; no erythema, induration, no fluctuance, no crepitus, no bullae  Psych: calm, cooperative      LABS/ CULTURES/ RADIOLOGY:              11.7   5.89  >-----------<  293      [05-23-23 @ 06:00]              36.1     133  |  93  |  7   ----------------------------<  94      [05-23-23 @ 06:00]  4.4   |  28  |  0.79        Ca     9.7     [05-23-23 @ 06:00]      Mg     2.10     [05-23-23 @ 06:00]      Phos  5.1     [05-23-23 @ 06:00]    TPro  6.7  /  Alb  3.8  /  TBili  0.4  /  DBili  x   /  AST  16  /  ALT  35  /  AlkPhos  122  [05-22-23 @ 07:30]        Vitamin D, 25-Hydroxy: 30.3 ng/mL (05-08-23 @ 05:55)        A1C with Estimated Average Glucose Result: 6.3 % (05-08-23 @ 05:55)  A1C with Estimated Average Glucose Result: 6.3 % (02-05-23 @ 10:43)      Culture - Abscess with Gram Stain (05.09.23 @ 10:50)   - Tetracycline: R >8  - Trimethoprim/Sulfamethoxazole: S <=0.5/9.5  - Vancomycin: S 2  - Ampicillin/Sulbactam: R 16/8  - Cefazolin: R <=4  - Clindamycin: R >4  - Daptomycin: S 0.5  - Gentamicin: S <=1 Should not be used as monotherapy  - Erythromycin: R >4  - Linezolid: S 1  - Oxacillin: R >2  - Penicillin: R >8  - Rifampin: R >2 Should not be used as monotherapy  Specimen Source: .Abscess right buttock  Culture Results:   Moderate Methicillin Resistant Staphylococcus aureus  Organism Identification: Methicillin resistant Staphylococcus aureus  Organism: Methicillin resistant Staphylococcus aureus  Method Type: HATTIE        Culture - Tissue with Gram Stain (03.21.23 @ 18:54)   - Trimethoprim/Sulfamethoxazole: S <=0.5/9.5  - Vancomycin: S 1  Gram Stain:   No polymorphonuclear leukocytes seen per low power field   No organisms seen per oil power field  - Ampicillin/Sulbactam: R <=8/4  - Cefazolin: R <=4  - Clindamycin: R >4  - Daptomycin: S 0.5  - Erythromycin: R >4  - Gentamicin: S <=1 Should not be used as monotherapy  - Linezolid: S 2  - Oxacillin: R >2  - Penicillin: R >8  - Rifampin: S <=1 Should not be used as monotherapy  - Tetracycline: R >8  Specimen Source: .Tissue LEFT LOWER LEG PUNCH BIOPSY  Culture Results:   Few Methicillin Resistant Staphylococcus aureus   Few Argelia albicans "Susceptibilities not performed"  Organism Identification: Methicillin resistant Staphylococcus aureus  Organism: Methicillin resistant Staphylococcus aureus  Method Type: [facility]      Surgical Pathology Report (05.10.23 @ 17:22)   Surgical Pathology Report:   ACCESSION No: [id number]   Patient: DAT WOOD   Accession: [id number]   Collected Date/Time: 5/10/2023 17:22 EDT   Received Date/Time: 5/10/2023 17:22 EDT   SurgicalPathology Report - Auth (Verified)   Specimen(s) Submitted   1 1- Right buttock ulcer   Final Diagnosis   1. Right buttock ulcer, excision:   - Fragments of skin with ulceration and acute and chronic inflammation   and fragments of necrotic connectivetissue with acute suppurative   inflammation (see note).   Note: Correlation with microbiological studies is necessary.   Verified by: PURNIMA Ramirez   (Electronic Signature)   Reported on: 05/23/23 13:09 EDT, [facility], [phone] [place] [address], [place] [address]   _________________________________________________________________   Clinical Information   Right buttock ulcer   Gross Description   Received: In formalin labeled "right buttock ulcer"   Size: Multiple fragments measuring 4.5 x 3.5 x 1.3 cm in aggregate   Description: Pale tan to gray-green, soft, necrotic tissue; the specimen   is serially sectioned   Submitted: Representative sections in one cassette: 1A   Rock Frazier 05/19/2023 03:14 PM   Disclaimer   In addition to other data that may appear on the specimen containers, all   labels have been inspected to confirm the presence of the patient's name   and date of birth.          < from: CT Pelvis w/ IV Cont (05.06.23 @ 21:22) >  ACC: [id number] EXAM:  CT PELVIS ONLY IC   ORDERED BY: HOWARD ANDRADE     PROCEDURE DATE:  05/06/2023          INTERPRETATION:  CLINICAL INFORMATION: Right gluteal infection. Evaluate   for abscess or subcutaneous emphysema.    COMPARISON: CT abdomen and pelvis from earlier same day.    CONTRAST/COMPLICATIONS:  IV Contrast: Omnipaque 350  90 cc administered   10 cc discarded  Oral Contrast: NONE  Complications: None reported at time of study completion    PROCEDURE:  CT of the Pelvis was performed.  Sagittal and coronal reformats were performed.    FINDINGS:  BLADDER: Opacified with excreted intravenous contrast.  REPRODUCTIVE ORGANS: Prostate within normal limits.  LYMPH NODES: No pelvic lymphadenopathy.    VISUALIZED PORTIONS:  ABDOMINALORGANS: Within normal limits.  BOWEL: No obstruction or inflammation.  PERITONEUM: Trace free pelvic fluid.  VESSELS: Within normal limits.  ABDOMINAL WALL: Moderate subcutaneous inflammatory change and skin   thickening in the right buttocks suggestive of a cellulitis. No   associated rim-enhancing fluid collection to suggest an abscess. No   subcutaneous gas.  BONES: No fracture or dislocation. Degenerative changes of the visualized   lower lumbar spine.    IMPRESSION:  Moderate subcutaneous inflammatory change and skin thickening in the   right buttocks suggestive of a cellulitis. No associated rim-enhancing   fluid collection to suggest an abscess. No subcutaneous gas.    --- End of Report ---          VICENTE GARCIA MD; Resident Radiologist  This document has been electronically signed.  ANGELINE DANIEL MD; Attending Radiologist  This document has been electronically signed. May  6 2023 10:36PM    < end of copied text >

## 2023-05-23 NOTE — PROGRESS NOTE ADULT - PROBLEM SELECTOR PLAN 4
PT with h/o CVID requiring IgG infusions q1mo (last 4/13/2023).  OP Immunologist: Dr. Mitchell Boxer ([facility])  IGg level low at 489   s/p gammagard 75 grams on 5/8. D/w A&I fellow Dr. Kimura on 5/10, no need to re-dose with gammagard S/D at this time. Can resume usual monthly infusions after discharge.    - Allergy and Immunology recs appreciated   -recorded allergy to amoxicillin and penicillin however tolerated augmentin in the past, may re-address allergy if he needs a beta lactem

## 2023-05-23 NOTE — PROGRESS NOTE ADULT - PROBLEM SELECTOR PLAN 10
Diet: CC  DVT: lovenox  Dispo: Unable to give IV antibiotics at [facility], may need to stay inpatient until 6/5 for IV abx unless recommendation for inpatient psychiatry changes

## 2023-05-24 PROCEDURE — 99232 SBSQ HOSP IP/OBS MODERATE 35: CPT | Mod: GC

## 2023-05-24 PROCEDURE — 99232 SBSQ HOSP IP/OBS MODERATE 35: CPT

## 2023-05-24 PROCEDURE — 99231 SBSQ HOSP IP/OBS SF/LOW 25: CPT

## 2023-05-24 RX ORDER — VANCOMYCIN HCL 1 G
1500 VIAL (EA) INTRAVENOUS EVERY 12 HOURS
Refills: 0 | Status: DISCONTINUED | OUTPATIENT
Start: 2023-05-24 | End: 2023-05-26

## 2023-05-24 RX ADMIN — SODIUM CHLORIDE 1 GRAM(S): 9 INJECTION INTRAMUSCULAR; INTRAVENOUS; SUBCUTANEOUS at 06:42

## 2023-05-24 RX ADMIN — ATORVASTATIN CALCIUM 40 MILLIGRAM(S): 80 TABLET, FILM COATED ORAL at 21:55

## 2023-05-24 RX ADMIN — Medication 1 APPLICATION(S): at 17:25

## 2023-05-24 RX ADMIN — LOSARTAN POTASSIUM 100 MILLIGRAM(S): 100 TABLET, FILM COATED ORAL at 06:42

## 2023-05-24 RX ADMIN — ARIPIPRAZOLE 15 MILLIGRAM(S): 15 TABLET ORAL at 13:21

## 2023-05-24 RX ADMIN — SODIUM CHLORIDE 1 GRAM(S): 9 INJECTION INTRAMUSCULAR; INTRAVENOUS; SUBCUTANEOUS at 21:55

## 2023-05-24 RX ADMIN — Medication 2000 UNIT(S): at 11:47

## 2023-05-24 RX ADMIN — Medication 300 MILLIGRAM(S): at 04:31

## 2023-05-24 RX ADMIN — ENOXAPARIN SODIUM 40 MILLIGRAM(S): 100 INJECTION SUBCUTANEOUS at 11:47

## 2023-05-24 RX ADMIN — Medication 300 MILLIGRAM(S): at 06:42

## 2023-05-24 RX ADMIN — Medication 300 MILLIGRAM(S): at 17:41

## 2023-05-24 RX ADMIN — Medication 1 APPLICATION(S): at 06:43

## 2023-05-24 RX ADMIN — SODIUM CHLORIDE 1 GRAM(S): 9 INJECTION INTRAMUSCULAR; INTRAVENOUS; SUBCUTANEOUS at 13:20

## 2023-05-24 NOTE — BH CONSULTATION LIAISON PROGRESS NOTE - NSBHCONSULTPRIMARYDISCUSSYES_PSY_A_CORE FT
case discussed w/ team

## 2023-05-24 NOTE — PROGRESS NOTE ADULT - PROBLEM SELECTOR PLAN 10
Diet: CC  DVT: lovenox  Dispo: Unable to give IV antibiotics at [facility], may need to stay inpatient until 6/5 for IV abx unless recommendation for inpatient psychiatry changes

## 2023-05-24 NOTE — PROGRESS NOTE ADULT - PROBLEM SELECTOR PLAN 4
PT with h/o CVID requiring IgG infusions q1mo (last 4/13/2023).  OP Immunologist: Dr. Mitchell Boxer ([facility])  IGg level low at 489   s/p gammagard 75 grams on 5/8. D/w A&I fellow Dr. Kimura on 5/10, no need to re-dose with gammagard S/D at this time. Can resume usual monthly infusions after discharge.    - Allergy and Immunology recs appreciated   -recorded allergy to amoxicillin and penicillin however tolerated augmentin in the past, may re-address allergy if he needs a beta lactem

## 2023-05-24 NOTE — PROGRESS NOTE ADULT - SUBJECTIVE AND OBJECTIVE BOX
Anisha KellyAlfaAnanda | PGY-2  Internal Medicine    OVERNIGHT EVENTS: no overnight events      SUBJECTIVE: Patient was examined at bedside this morning. Appeared comfortable. Overnight vitals and monitoring results were reviewed.       MEDICATIONS  (STANDING):  ARIPiprazole 15 milliGRAM(s) Oral daily  atorvastatin 40 milliGRAM(s) Oral at bedtime  budesonide 160 MICROgram(s)/formoterol 4.5 MICROgram(s) Inhaler 2 Puff(s) Inhalation two times a day  cholecalciferol 2000 Unit(s) Oral daily  Dakins Solution - 1/4 Strength 1 Application(s) Topical two times a day  diltiazem    milliGRAM(s) Oral daily  enoxaparin Injectable 40 milliGRAM(s) SubCutaneous every 24 hours  lidocaine   4% Patch 1 Patch Transdermal every 24 hours  losartan 100 milliGRAM(s) Oral daily  sodium chloride 1 Gram(s) Oral three times a day  vancomycin  IVPB 1500 milliGRAM(s) IV Intermittent every 8 hours    MEDICATIONS  (PRN):  acetaminophen     Tablet .. 975 milliGRAM(s) Oral every 6 hours PRN Temp greater or equal to 38C (100.4F), Mild Pain (1 - 3), Moderate Pain (4 - 6), Severe Pain (7 - 10)  albuterol    90 MICROgram(s) HFA Inhaler 2 Puff(s) Inhalation every 6 hours PRN Shortness of Breath and/or Wheezing  OLANZapine 5 milliGRAM(s) Oral every 6 hours PRN Agitation        T(F): 97.9 (05-24-23 @ 05:09), Max: 97.9 (05-24-23 @ 05:09)  HR: 72 (05-24-23 @ 06:41) (60 - 73)  BP: 149/81 (05-24-23 @ 06:41) (133/84 - 149/81)  BP(mean): --  RR: 17 (05-24-23 @ 05:09) (17 - 17)  SpO2: 100% (05-24-23 @ 05:09) (93% - 100%)    PHYSICAL EXAM:     GENERAL: NAD, lying in bed comfortably  HEAD:  Atraumatic, Normocephalic  EYES: EOMI, PERRLA, conjunctiva and sclera clear, no nystagmus noted  CHEST/LUNG: Clear to auscultation bilaterally; No rales, rhonchi, wheezing, or rubs. Unlabored respirations  HEART: Regular rate and rhythm; No murmurs, rubs, or gallops, normal S1/S2  ABDOMEN: normal bowel sounds; Soft, nontender, nondistended, no organomegaly   EXTREMITIES:  2+ Peripheral Pulses, brisk capillary refill. No clubbing, cyanosis, or edema  MSK: No gross deformities noted   Neurological:  A&Ox3, no focal deficits      TELEMETRY:    LABS:                        11.7   5.89  )-----------( 293      ( 23 May 2023 06:00 )             36.1     05-23    133<L>  |  93<L>  |  7   ----------------------------<  94  4.4   |  28  |  0.79    Ca    9.7      23 May 2023 06:00  Phos  5.1     05-23  Mg     2.10     05-23              Creatinine Trend: 0.79<--, 0.80<--, 0.67<--, 0.68<--, 0.75<--, 0.83<--  I&O's Summary    BNP    RADIOLOGY & ADDITIONAL STUDIES:

## 2023-05-24 NOTE — PROGRESS NOTE ADULT - PROBLEM SELECTOR PLAN 1
#Cellulitis c/b Sepsis  Pt with significant right gluteal wound progressive x1 weeks. P/w tachycardia, leukocytsosis c/w sepsis. Has h/o MRSA infection, requiring long course of vancomycin in the past. Historical Derm Bx negative for PG, but if persistent with poor wound healing, may reconsider further evaluation.  hx of left   left gluteal wound requiring debridement  surgery recommends multidisciplinary consults to determine etiology prior to debridement   TTE: ef: 60% unremarkable, endocardium could not be adequately visualized  5/6 BCX: MRSA; 5/8, 5/10 BCx: NGTD   s/p cefepime   UCx negative   - s/p wound care debridement 5/10, f/u path results, will f/u if any interventions needed inpatient  - Derm recs appreciated: not likely Pyoderma Gangrenosum  - c/w vancomycin (5/7-6/5)  - f/u ID recs

## 2023-05-24 NOTE — BH CONSULTATION LIAISON PROGRESS NOTE - NSBHATTESTCOMMENTATTENDFT_PSY_A_CORE
met with the patient today, impression and plan discussed with resident md and agreed upon  Calm, compliant, no behavioral issues.   Patient engages well in conversation, denies any mood symptoms, denies any si or hi, denies any ah or vh or delusions when asked. Talks about ' wanting to eat healthy so that my sugars are better, and I lose weight'.   I agree that at this time, there is no acute psychiatric symptoms that would warrant an involuntary psychiatric admission. Patient will need IV antibiotics till 6/5-- so will need to be here at [facility] in a rehab for this.    met with the patient today, impression and plan discussed with resident md and agreed upon  Calm, compliant, no behavioral issues.   Patient engages well in conversation, denies any mood symptoms, denies any si or hi, denies any ah or vh or delusions when asked. Talks about ' wanting to eat healthy so that my sugars are better, and I lose weight'.

## 2023-05-24 NOTE — PROGRESS NOTE ADULT - ATTENDING COMMENTS
57M Schizoaffective D/O, CVID/hypogammaglobulinemia on monthly IVIG, HTN, DM2, p/w sepsis from Lt gluteal MRSA abscess/cellulitis – failed previous treatments c/b MRSA bacteremia s/p Debridement 5/10, SIADH.  - continue Vanco IV until 6/5  - patient requires inpatient psychiatric facility for management - but unable to care for patient with PICC line d/t safety  - continue to monitor clinical status.

## 2023-05-24 NOTE — BH CONSULTATION LIAISON PROGRESS NOTE - NSBHASSESSMENTFT_PSY_ALL_CORE
56M, domiciled in supportive housing TSI, SSD, single, PMH HTN, DM, hypogammaglobulinemia, PPHx schizoaffective d/o, bipolar d/o, hx of multiple psych hospitalizations (last known in 2020 at [facility]), denies hx SA/NSSIB, denies drug or alcohol use, denies legal hx, who presented with right gluteal wound, admitted to medicine for cellulutis. Psychiatry consulted for psychosis/schizophrenia.    On assessment, patient is calm and cooperative. AAO3. No overt symptoms of psychosis, depression, or yo. No S/I or H/I. Patient w/ recurrent/chronic infections. Patient has left multiple hospitals AMA while undergoing treatment for cellulitis. However, patient recognizes benefit of continuing w/ antibiotic treatment, as well as risks of discontinuing treatment. Patient not currently refusing treatment or attempting to leave AMA. Patient interested in reconnection to outpatient care after discharge. Patient was due for Haldol decanoate 150mg on 4/16. Will obtain collateral see when last received.     5/9: no interval change. no overt positive symptoms of psychosis. patient w/ likely negative/cognitive symptoms that impair ability to adequately care for self (e.g. likely contribute to poor hygiene, recurrent infections, etc.). no si or hi. interested in resuming outpatient care and restarting psychotropics. recommend aripiprazole 5mg qd for psychosis/mood (w/ reported history of depression).     5/10: status quo. no psychosis or depression. patient w/ impaired ability to care for self, likely a result of negative/cognitive symptoms. per chart review, patient has not seen outpt psychiatrist since 9/22. cannot leave AMA. will consider inpatient psych once medically cleared.     5/11: feels physically better. mood improved. no overt positive psychotic symptoms. can increase aripiprazole to 10mg qd.     5/12: minimal interval change. pleasant, calm, cooperative, likely cognitively limited due to chronic SMI. c/w aripiprazole 10mg    5/13: status quo. no mood or psychotic sx. in good behavioral control. can increase aripiprazole to 15mg.      5/22: pleasant, cooperative, adherent w/ medical and psychiatric treatment. no overt psychosis. plan for abilify injection tomorrow.     5/24: remains pleasant, cooperative. no psychosis. s/p abilify BELLA.    Plan:  - Routine observation, no psychiatric indication for CO 1:1  - c/w aripiprazole 15mg qd - REQUIRES 2 WEEKS OF ARIPIPRAZOLE 15MG QD ORAL OVERLAP  - abilify maintenna 300mg injection 5/23 - [id number]  - If patient refuses treatment or attempts to leave AMA, psych can reassess for capacity   - Zyprexa 2.5-5mg PO/IM/IV q6hrs PRN for agitation  - CANNOT LEAVE AMA  - Dispo: patient requires PICC until 6/5, can consider transfer for rehab for continued IV abx, patient likely does not need inpatient psych    56M, domiciled in supportive housing TSI, SSD, single, PMH HTN, DM, hypogammaglobulinemia, PPHx schizoaffective d/o, bipolar d/o, hx of multiple psych hospitalizations (last known in 2020 at [facility]), denies hx SA/NSSIB, denies drug or alcohol use, denies legal hx, who presented with right gluteal wound, admitted to medicine for cellulutis. Psychiatry consulted for psychosis/schizophrenia.    On assessment, patient is calm and cooperative. AAO3. No overt symptoms of psychosis, depression, or yo. No S/I or H/I. Patient w/ recurrent/chronic infections. Patient has left multiple hospitals AMA while undergoing treatment for cellulitis. However, patient recognizes benefit of continuing w/ antibiotic treatment, as well as risks of discontinuing treatment. Patient not currently refusing treatment or attempting to leave AMA. Patient interested in reconnection to outpatient care after discharge. Patient was due for Haldol decanoate 150mg on 4/16. Will obtain collateral see when last received.     5/9: no interval change. no overt positive symptoms of psychosis. patient w/ likely negative/cognitive symptoms that impair ability to adequately care for self (e.g. likely contribute to poor hygiene, recurrent infections, etc.). no si or hi. interested in resuming outpatient care and restarting psychotropics. recommend aripiprazole 5mg qd for psychosis/mood (w/ reported history of depression).     5/10: status quo. no psychosis or depression. patient w/ impaired ability to care for self, likely a result of negative/cognitive symptoms. per chart review, patient has not seen outpt psychiatrist since 9/22. cannot leave AMA. will consider inpatient psych once medically cleared.     5/11: feels physically better. mood improved. no overt positive psychotic symptoms. can increase aripiprazole to 10mg qd.     5/12: minimal interval change. pleasant, calm, cooperative, likely cognitively limited due to chronic SMI. c/w aripiprazole 10mg    5/13: status quo. no mood or psychotic sx. in good behavioral control. can increase aripiprazole to 15mg.      5/22: pleasant, cooperative, adherent w/ medical and psychiatric treatment. no overt psychosis. plan for abilify injection tomorrow.     5/24: remains pleasant, cooperative. no psychosis. s/p abilify BELLA.    Plan:  - Routine observation, no psychiatric indication for CO 1:1  - c/w aripiprazole 15mg qd - REQUIRES 2 WEEKS OF ARIPIPRAZOLE 15MG QD ORAL OVERLAP  - abilify maintenna 300mg injection 5/23 - [id number]  - If patient refuses treatment or attempts to leave AMA, psych can reassess for capacity   - Zyprexa 2.5-5mg PO/IM/IV q6hrs PRN for agitation  - CANNOT LEAVE AMA  - Dispo: patient requires PICC until 6/5. Psychiatrically continuing to do better. Will reassess psychiatric disposition plans

## 2023-05-24 NOTE — BH CONSULTATION LIAISON PROGRESS NOTE - NSBHMSESPABN_PSY_A_CORE
Slowed rate/Other

## 2023-05-24 NOTE — PROGRESS NOTE ADULT - PROBLEM SELECTOR PLAN 2
Historically diagnosed. Pt confirms schizophrenia but denies being on any active psychotropic medications. Denies AH/VH. Denies SI/HI. Does not specify why he left AMA from other hospital, but does understand that he needs help here. historically lacked capacity to leave AMA, but not trying at this time.  - No need for 1:1 at this time  - Psychiatry/ Consult recs appreciated  - c/w aripiprazole for psychosis/mood,  ordered BELLA abilify maintena 300mg Qmo initiated Tue 5/23 (f/u  further for logistics)   - C/W zyrprexa 5 PO for agitation  - Per , pt cannot leave AMA at this time.  plans for inpatient psych after medically cleared

## 2023-05-24 NOTE — PROGRESS NOTE ADULT - SUBJECTIVE AND OBJECTIVE BOX
CC: Patient is a 57y old  Male who presents with a chief complaint of MRSA bacteremia iso R buttock cellulitis (20 May 2023 05:42)    ID following for MRSA bacteremia, R buttock wound    Interval History/ROS: Patient has no new complaints. No fevers, no chills.    Rest of ROS negative.    Allergies  amoxicillin (Fever)  penicillin (Rash)    ANTIMICROBIALS:  vancomycin  IVPB 1500 every 8 hours    OTHER MEDS:  acetaminophen     Tablet .. 975 milliGRAM(s) Oral every 6 hours PRN  albuterol    90 MICROgram(s) HFA Inhaler 2 Puff(s) Inhalation every 6 hours PRN  ARIPiprazole 15 milliGRAM(s) Oral daily  atorvastatin 40 milliGRAM(s) Oral at bedtime  budesonide 160 MICROgram(s)/formoterol 4.5 MICROgram(s) Inhaler 2 Puff(s) Inhalation two times a day  cholecalciferol 2000 Unit(s) Oral daily  Dakins Solution - 1/4 Strength 1 Application(s) Topical two times a day  diltiazem    milliGRAM(s) Oral daily  enoxaparin Injectable 40 milliGRAM(s) SubCutaneous every 24 hours  lidocaine   4% Patch 1 Patch Transdermal every 24 hours  losartan 100 milliGRAM(s) Oral daily  OLANZapine 5 milliGRAM(s) Oral every 6 hours PRN  sodium chloride 1 Gram(s) Oral three times a day    PE:    Vital Signs Last 24 Hrs  T(C): 36.6 (24 May 2023 05:09), Max: 36.6 (24 May 2023 05:09)  T(F): 97.9 (24 May 2023 05:09), Max: 97.9 (24 May 2023 05:09)  HR: 72 (24 May 2023 06:41) (60 - 73)  BP: 149/81 (24 May 2023 06:41) (133/84 - 149/81)  BP(mean): --  RR: 17 (24 May 2023 05:09) (17 - 17)  SpO2: 100% (24 May 2023 05:09) (93% - 100%)    Parameters below as of 24 May 2023 05:09  Patient On (Oxygen Delivery Method): room air    Gen: AOx3, NAD  CV: S1+S2 normal, no murmurs  Resp: Clear bilat, no resp distress  Abd: Soft, nontender, +BS  Ext: No LE edema, no wounds  : No Perez  IV/Skin: No thrombophlebitis, R buttock wound without purulent drainage, healing well  Neuro: no focal deficits    LABS:                          11.7   5.89  )-----------( 293      ( 23 May 2023 06:00 )             36.1       05-23    133<L>  |  93<L>  |  7   ----------------------------<  94  4.4   |  28  |  0.79    Ca    9.7      23 May 2023 06:00  Phos  5.1     05-23  Mg     2.10     05-23            MICROBIOLOGY:  v  .Blood Blood-Peripheral  05-10-23   No Growth Final  --  --      .Blood Blood-Peripheral  05-10-23   No Growth Final  --  --      .Abscess right buttock  05-09-23   Moderate Methicillin Resistant Staphylococcus aureus  --  Methicillin resistant Staphylococcus aureus      .Blood Blood-Peripheral  05-08-23   No Growth Final  --  --      .Blood Blood-Peripheral  05-08-23   No Growth Final  --  --      Clean Catch Clean Catch (Midstream)  05-06-23   No growth  --  --      .Blood Blood-Peripheral  05-06-23   Growth in aerobic bottle: Methicillin Resistant Staphylococcus aureus  ***Blood Panel PCR results on this specimen are available  approximately 3 hours after the Gram stain result.***  Gram stain, PCR, and/or culture results may not always  correspond due to difference in methodologies.  ************************************************************  This PCR assay was performed by multiplex PCR. This  Assay tests for 66 bacterial and resistance gene targets.  Please refer to the [facility] [employer] test directory  at [email or web address] for details.  --  Blood Culture PCR  Methicillin resistant Staphylococcus aureus      .Blood Blood-Peripheral  05-06-23   No Growth Final  --  --    RADIOLOGY:    < from: CT Pelvis w/ IV Cont (05.06.23 @ 21:22) >  IMPRESSION:  Moderate subcutaneous inflammatory change and skin thickening in the   right buttocks suggestive of a cellulitis. No associated rim-enhancing   fluid collection to suggest an abscess. No subcutaneous gas.    < end of copied text >

## 2023-05-24 NOTE — PROGRESS NOTE ADULT - ASSESSMENT
57 M with bipolar, schizophrenia, HTN,  bronchiectasis, CVID, hypogammaglobulinemia (monthly IVIG), previous LLE cellulitis/abscess and MRSA bacteremia, went to [facility] for R buttock wound, cellulitis but signed out AMA and was given clinda now p/w worsening pain/edema  here afebrile, WBC: 15  abd/pelvis CT: buttock cellulitis, no abscess seen  blood cx: MRSA, repeat negative 5/8  s/p wound debridement by wound care 5/10, had Necrotic dermis and subcutaneous fatty tissue Into subcutaneous tissues, cx also with MRSA  TTE unable to exclude endocarditis    leukocytosis, MRSA bacteremia due to buttock cellulitis and abscess in the setting of CVID, on monthly IVIG and previous MRSA bacteremia with abscess  repeat blood cx negative 5/8, TTE unable to exclude endocarditis   s/p I&D of necrotic skin and fatty tissue 5/10, cx with MRSA as well    Recommend:  * Continue vanco  * monitor renal function to prevent nephrotoxicity  * f/u with wound care  * will need a 4 week course of vanco post negative blood cx through 6/5  * weekly CBC, CMP, vanco trough while on antibiotics     Jose Faye MD  Available through MS Teams  If no response, or after 5pm/weekends, call [phone]     57 M with bipolar, schizophrenia, HTN,  bronchiectasis, CVID, hypogammaglobulinemia (monthly IVIG), previous LLE cellulitis/abscess and MRSA bacteremia, went to [facility] for R buttock wound, cellulitis but signed out AMA and was given clinda now p/w worsening pain/edema  here afebrile, WBC: 15  abd/pelvis CT: buttock cellulitis, no abscess seen  blood cx: MRSA, repeat negative 5/8  s/p wound debridement by wound care 5/10, had Necrotic dermis and subcutaneous fatty tissue Into subcutaneous tissues, cx also with MRSA  TTE unable to exclude endocarditis    leukocytosis, MRSA bacteremia due to buttock cellulitis and abscess in the setting of CVID, on monthly IVIG and previous MRSA bacteremia with abscess  repeat blood cx negative 5/8, TTE unable to exclude endocarditis   s/p I&D of necrotic skin and fatty tissue 5/10, cx with MRSA as well    Recommend:  * Continue vanco, would decrease to 1500 mg IV q 12 hours based on AUC calculations  * monitor renal function to prevent nephrotoxicity  * f/u with wound care  * will need a 4 week course of vanco post negative blood cx through 6/5  * weekly CBC, CMP, vanco trough while on antibiotics     Jose Faye MD  Available through MS Teams  If no response, or after 5pm/weekends, call [phone]    Plan discussed with primary team.

## 2023-05-25 LAB — VANCOMYCIN TROUGH SERPL-MCNC: 12.8 UG/ML — SIGNIFICANT CHANGE UP (ref 10–20)

## 2023-05-25 PROCEDURE — 99232 SBSQ HOSP IP/OBS MODERATE 35: CPT | Mod: GC

## 2023-05-25 RX ADMIN — LOSARTAN POTASSIUM 100 MILLIGRAM(S): 100 TABLET, FILM COATED ORAL at 05:34

## 2023-05-25 RX ADMIN — Medication 300 MILLIGRAM(S): at 05:34

## 2023-05-25 RX ADMIN — Medication 1 APPLICATION(S): at 05:34

## 2023-05-25 RX ADMIN — SODIUM CHLORIDE 1 GRAM(S): 9 INJECTION INTRAMUSCULAR; INTRAVENOUS; SUBCUTANEOUS at 05:34

## 2023-05-25 RX ADMIN — Medication 2000 UNIT(S): at 11:57

## 2023-05-25 RX ADMIN — SODIUM CHLORIDE 1 GRAM(S): 9 INJECTION INTRAMUSCULAR; INTRAVENOUS; SUBCUTANEOUS at 14:30

## 2023-05-25 RX ADMIN — ARIPIPRAZOLE 15 MILLIGRAM(S): 15 TABLET ORAL at 11:58

## 2023-05-25 RX ADMIN — SODIUM CHLORIDE 1 GRAM(S): 9 INJECTION INTRAMUSCULAR; INTRAVENOUS; SUBCUTANEOUS at 22:43

## 2023-05-25 RX ADMIN — Medication 300 MILLIGRAM(S): at 16:36

## 2023-05-25 RX ADMIN — ATORVASTATIN CALCIUM 40 MILLIGRAM(S): 80 TABLET, FILM COATED ORAL at 22:43

## 2023-05-25 RX ADMIN — BUDESONIDE AND FORMOTEROL FUMARATE DIHYDRATE 2 PUFF(S): 160; 4.5 AEROSOL RESPIRATORY (INHALATION) at 22:42

## 2023-05-25 RX ADMIN — ENOXAPARIN SODIUM 40 MILLIGRAM(S): 100 INJECTION SUBCUTANEOUS at 11:58

## 2023-05-25 RX ADMIN — Medication 300 MILLIGRAM(S): at 05:33

## 2023-05-25 RX ADMIN — Medication 1 APPLICATION(S): at 18:43

## 2023-05-25 NOTE — PROGRESS NOTE ADULT - PROBLEM SELECTOR PLAN 4
PT with h/o CVID requiring IgG infusions q1mo (last 4/13/2023).  OP Immunologist: Dr. Mitchell Boxer ([facility])  IGg level low at 489   s/p gammagard 75 grams on 5/8. D/w A&I fellow Dr. Kimura on 5/10, no need to re-dose with gammagard S/D at this time. Can resume usual monthly infusions after discharge.    - Allergy and Immunology recs appreciated   -recorded allergy to amoxicillin and penicillin however tolerated augmentin in the past, may re-address allergy if he needs a beta lactem

## 2023-05-25 NOTE — PROGRESS NOTE ADULT - PROBLEM SELECTOR PLAN 10
Diet: CC  DVT: lovenox  Dispo: Unable to give IV antibiotics at [facility], may need to stay inpatient until 6/5 for IV abx unless recommendation for inpatient psychiatry changes

## 2023-05-25 NOTE — PROGRESS NOTE ADULT - SUBJECTIVE AND OBJECTIVE BOX
Anisha MendozaAlfaAnanda | PGY-2  Internal Medicine    OVERNIGHT EVENTS: no overnight events      SUBJECTIVE: Patient was examined at bedside this morning. Appeared comfortable. Overnight vitals and monitoring results were reviewed.      MEDICATIONS  (STANDING):  ARIPiprazole 15 milliGRAM(s) Oral daily  atorvastatin 40 milliGRAM(s) Oral at bedtime  budesonide 160 MICROgram(s)/formoterol 4.5 MICROgram(s) Inhaler 2 Puff(s) Inhalation two times a day  cholecalciferol 2000 Unit(s) Oral daily  Dakins Solution - 1/4 Strength 1 Application(s) Topical two times a day  diltiazem    milliGRAM(s) Oral daily  enoxaparin Injectable 40 milliGRAM(s) SubCutaneous every 24 hours  lidocaine   4% Patch 1 Patch Transdermal every 24 hours  losartan 100 milliGRAM(s) Oral daily  sodium chloride 1 Gram(s) Oral three times a day  vancomycin  IVPB 1500 milliGRAM(s) IV Intermittent every 12 hours    MEDICATIONS  (PRN):  acetaminophen     Tablet .. 975 milliGRAM(s) Oral every 6 hours PRN Temp greater or equal to 38C (100.4F), Mild Pain (1 - 3), Moderate Pain (4 - 6), Severe Pain (7 - 10)  albuterol    90 MICROgram(s) HFA Inhaler 2 Puff(s) Inhalation every 6 hours PRN Shortness of Breath and/or Wheezing  OLANZapine 5 milliGRAM(s) Oral every 6 hours PRN Agitation        T(F): 97.3 (05-25-23 @ 05:17), Max: 97.7 (05-24-23 @ 15:10)  HR: 74 (05-25-23 @ 05:17) (58 - 74)  BP: 153/95 (05-25-23 @ 05:17) (139/80 - 153/95)  BP(mean): --  RR: 18 (05-25-23 @ 05:17) (17 - 18)  SpO2: 94% (05-25-23 @ 05:17) (94% - 99%)    PHYSICAL EXAM:     GENERAL: NAD, lying in bed comfortably  HEAD:  Atraumatic, Normocephalic  EYES: EOMI, PERRLA, conjunctiva and sclera clear, no nystagmus noted  ENT: Moist mucous membranes,   NECK: Supple, No JVD, trachea midline  CHEST/LUNG: Clear to auscultation bilaterally; No rales, rhonchi, wheezing, or rubs. Unlabored respirations  HEART: Regular rate and rhythm; No murmurs, rubs, or gallops, normal S1/S2  ABDOMEN: normal bowel sounds; Soft, nontender, nondistended, no organomegaly   EXTREMITIES:  2+ Peripheral Pulses, brisk capillary refill. No clubbing, cyanosis, or edema  MSK: No gross deformities noted   Neurological:  A&Ox3, no focal deficits   SKIN: No rashes or lesions  PSYCH: Normal mood, affect     TELEMETRY:    LABS:                  Creatinine Trend: 0.79<--, 0.80<--, 0.67<--, 0.68<--, 0.75<--, 0.83<--  I&O's Summary    24 May 2023 07:01  -  25 May 2023 07:00  --------------------------------------------------------  IN: 1000 mL / OUT: 0 mL / NET: 1000 mL      BNP    RADIOLOGY & ADDITIONAL STUDIES:             Anisha AsenciobuenaAlfaAnanda | PGY-2  Internal Medicine    OVERNIGHT EVENTS: no overnight events      SUBJECTIVE: Patient was examined at bedside this morning. Appeared comfortable. Overnight vitals and monitoring results were reviewed.      MEDICATIONS  (STANDING):  ARIPiprazole 15 milliGRAM(s) Oral daily  atorvastatin 40 milliGRAM(s) Oral at bedtime  budesonide 160 MICROgram(s)/formoterol 4.5 MICROgram(s) Inhaler 2 Puff(s) Inhalation two times a day  cholecalciferol 2000 Unit(s) Oral daily  Dakins Solution - 1/4 Strength 1 Application(s) Topical two times a day  diltiazem    milliGRAM(s) Oral daily  enoxaparin Injectable 40 milliGRAM(s) SubCutaneous every 24 hours  lidocaine   4% Patch 1 Patch Transdermal every 24 hours  losartan 100 milliGRAM(s) Oral daily  sodium chloride 1 Gram(s) Oral three times a day  vancomycin  IVPB 1500 milliGRAM(s) IV Intermittent every 12 hours    MEDICATIONS  (PRN):  acetaminophen     Tablet .. 975 milliGRAM(s) Oral every 6 hours PRN Temp greater or equal to 38C (100.4F), Mild Pain (1 - 3), Moderate Pain (4 - 6), Severe Pain (7 - 10)  albuterol    90 MICROgram(s) HFA Inhaler 2 Puff(s) Inhalation every 6 hours PRN Shortness of Breath and/or Wheezing  OLANZapine 5 milliGRAM(s) Oral every 6 hours PRN Agitation        T(F): 97.3 (05-25-23 @ 05:17), Max: 97.7 (05-24-23 @ 15:10)  HR: 74 (05-25-23 @ 05:17) (58 - 74)  BP: 153/95 (05-25-23 @ 05:17) (139/80 - 153/95)  BP(mean): --  RR: 18 (05-25-23 @ 05:17) (17 - 18)  SpO2: 94% (05-25-23 @ 05:17) (94% - 99%)    PHYSICAL EXAM:     GENERAL: NAD, lying in bed comfortably  HEAD:  Atraumatic, Normocephalic  EYES: EOMI, PERRLA, conjunctiva and sclera clear, no nystagmus noted  CHEST/LUNG: Clear to auscultation bilaterally; No rales, rhonchi, wheezing, or rubs. Unlabored respirations  HEART: Regular rate and rhythm; No murmurs, rubs, or gallops, normal S1/S2  ABDOMEN: normal bowel sounds; Soft, nontender, nondistended, no organomegaly   EXTREMITIES:  2+ Peripheral Pulses, brisk capillary refill. No clubbing, cyanosis, or edema  MSK: No gross deformities noted   Neurological:  A&Ox3, no focal deficits      TELEMETRY:    LABS:                  Creatinine Trend: 0.79<--, 0.80<--, 0.67<--, 0.68<--, 0.75<--, 0.83<--  I&O's Summary    24 May 2023 07:01  -  25 May 2023 07:00  --------------------------------------------------------  IN: 1000 mL / OUT: 0 mL / NET: 1000 mL      BNP    RADIOLOGY & ADDITIONAL STUDIES:

## 2023-05-26 LAB — VANCOMYCIN TROUGH SERPL-MCNC: 9.7 UG/ML — LOW (ref 10–20)

## 2023-05-26 PROCEDURE — 99232 SBSQ HOSP IP/OBS MODERATE 35: CPT | Mod: GC

## 2023-05-26 RX ORDER — VANCOMYCIN HCL 1 G
1250 VIAL (EA) INTRAVENOUS EVERY 8 HOURS
Refills: 0 | Status: DISCONTINUED | OUTPATIENT
Start: 2023-05-26 | End: 2023-05-28

## 2023-05-26 RX ADMIN — LOSARTAN POTASSIUM 100 MILLIGRAM(S): 100 TABLET, FILM COATED ORAL at 06:02

## 2023-05-26 RX ADMIN — Medication 1 APPLICATION(S): at 06:02

## 2023-05-26 RX ADMIN — SODIUM CHLORIDE 1 GRAM(S): 9 INJECTION INTRAMUSCULAR; INTRAVENOUS; SUBCUTANEOUS at 15:04

## 2023-05-26 RX ADMIN — ENOXAPARIN SODIUM 40 MILLIGRAM(S): 100 INJECTION SUBCUTANEOUS at 12:30

## 2023-05-26 RX ADMIN — Medication 2000 UNIT(S): at 12:30

## 2023-05-26 RX ADMIN — SODIUM CHLORIDE 1 GRAM(S): 9 INJECTION INTRAMUSCULAR; INTRAVENOUS; SUBCUTANEOUS at 21:54

## 2023-05-26 RX ADMIN — SODIUM CHLORIDE 1 GRAM(S): 9 INJECTION INTRAMUSCULAR; INTRAVENOUS; SUBCUTANEOUS at 06:02

## 2023-05-26 RX ADMIN — Medication 1 APPLICATION(S): at 18:18

## 2023-05-26 RX ADMIN — ARIPIPRAZOLE 15 MILLIGRAM(S): 15 TABLET ORAL at 18:17

## 2023-05-26 RX ADMIN — ATORVASTATIN CALCIUM 40 MILLIGRAM(S): 80 TABLET, FILM COATED ORAL at 21:54

## 2023-05-26 RX ADMIN — Medication 300 MILLIGRAM(S): at 06:02

## 2023-05-26 RX ADMIN — Medication 300 MILLIGRAM(S): at 04:16

## 2023-05-26 RX ADMIN — Medication 166.67 MILLIGRAM(S): at 21:55

## 2023-05-26 NOTE — PROGRESS NOTE ADULT - PROBLEM SELECTOR PLAN 10
Diet: CC  DVT: lovenox  Dispo: Unable to give IV antibiotics at [facility], may need to stay inpatient until 6/5 for IV abx unless recommendation for inpatient psychiatry changes

## 2023-05-26 NOTE — PROGRESS NOTE ADULT - SUBJECTIVE AND OBJECTIVE BOX
Anisha KellyAlfaAnanda | PGY-2  Internal Medicine    OVERNIGHT EVENTS: no overnight events      SUBJECTIVE: Patient was examined at bedside this morning. Appeared comfortable. Overnight vitals and monitoring results were reviewed.       MEDICATIONS  (STANDING):  ARIPiprazole 15 milliGRAM(s) Oral daily  atorvastatin 40 milliGRAM(s) Oral at bedtime  budesonide 160 MICROgram(s)/formoterol 4.5 MICROgram(s) Inhaler 2 Puff(s) Inhalation two times a day  cholecalciferol 2000 Unit(s) Oral daily  Dakins Solution - 1/4 Strength 1 Application(s) Topical two times a day  diltiazem    milliGRAM(s) Oral daily  enoxaparin Injectable 40 milliGRAM(s) SubCutaneous every 24 hours  lidocaine   4% Patch 1 Patch Transdermal every 24 hours  losartan 100 milliGRAM(s) Oral daily  sodium chloride 1 Gram(s) Oral three times a day  vancomycin  IVPB 1500 milliGRAM(s) IV Intermittent every 12 hours    MEDICATIONS  (PRN):  acetaminophen     Tablet .. 975 milliGRAM(s) Oral every 6 hours PRN Temp greater or equal to 38C (100.4F), Mild Pain (1 - 3), Moderate Pain (4 - 6), Severe Pain (7 - 10)  albuterol    90 MICROgram(s) HFA Inhaler 2 Puff(s) Inhalation every 6 hours PRN Shortness of Breath and/or Wheezing  OLANZapine 5 milliGRAM(s) Oral every 6 hours PRN Agitation        T(F): 98.4 (05-26-23 @ 04:58), Max: 98.4 (05-26-23 @ 04:58)  HR: 67 (05-26-23 @ 04:58) (65 - 67)  BP: 132/68 (05-26-23 @ 04:58) (122/67 - 132/68)  BP(mean): --  RR: 18 (05-26-23 @ 04:58) (18 - 18)  SpO2: 90% (05-26-23 @ 04:58) (90% - 97%)    PHYSICAL EXAM:     GENERAL: NAD, lying in bed comfortably  HEAD:  Atraumatic, Normocephalic  EYES: EOMI, PERRLA, conjunctiva and sclera clear, no nystagmus noted  CHEST/LUNG: Clear to auscultation bilaterally; No rales, rhonchi, wheezing, or rubs. Unlabored respirations  HEART: Regular rate and rhythm; No murmurs, rubs, or gallops, normal S1/S2  ABDOMEN: normal bowel sounds; Soft, nontender, nondistended, no organomegaly   EXTREMITIES:  2+ Peripheral Pulses, brisk capillary refill. No clubbing, cyanosis, or edema  Neurological:  A&Ox3, no focal deficits     TELEMETRY:    LABS:                  Creatinine Trend: 0.79<--, 0.80<--, 0.67<--, 0.68<--, 0.75<--, 0.83<--  I&O's Summary    25 May 2023 07:01  -  26 May 2023 07:00  --------------------------------------------------------  IN: 1800 mL / OUT: 0 mL / NET: 1800 mL      BNP    RADIOLOGY & ADDITIONAL STUDIES:

## 2023-05-26 NOTE — PROGRESS NOTE ADULT - PROBLEM SELECTOR PLAN 4
PT with h/o CVID requiring IgG infusions q1mo (last 4/13/2023).  OP Immunologist: Dr. Mitchell Boxer ([facility])  IGg level low at 489   s/p gammagard 75 grams on 5/8. D/w A&I fellow Dr. Kimura on 5/10, no need to re-dose with gammagard S/D at this time. Can resume usual monthly infusions after discharge.    - Allergy and Immunology recs appreciated   -recorded allergy to amoxicillin and penicillin however tolerated augmentin in the past, may re-address allergy if he needs a beta lactem

## 2023-05-27 LAB — VANCOMYCIN TROUGH SERPL-MCNC: 13.8 UG/ML — SIGNIFICANT CHANGE UP (ref 10–20)

## 2023-05-27 PROCEDURE — 99232 SBSQ HOSP IP/OBS MODERATE 35: CPT | Mod: GC

## 2023-05-27 RX ADMIN — ATORVASTATIN CALCIUM 40 MILLIGRAM(S): 80 TABLET, FILM COATED ORAL at 21:21

## 2023-05-27 RX ADMIN — Medication 166.67 MILLIGRAM(S): at 14:25

## 2023-05-27 RX ADMIN — Medication 166.67 MILLIGRAM(S): at 22:50

## 2023-05-27 RX ADMIN — SODIUM CHLORIDE 1 GRAM(S): 9 INJECTION INTRAMUSCULAR; INTRAVENOUS; SUBCUTANEOUS at 13:54

## 2023-05-27 RX ADMIN — SODIUM CHLORIDE 1 GRAM(S): 9 INJECTION INTRAMUSCULAR; INTRAVENOUS; SUBCUTANEOUS at 06:30

## 2023-05-27 RX ADMIN — Medication 2000 UNIT(S): at 13:55

## 2023-05-27 RX ADMIN — Medication 1 APPLICATION(S): at 09:17

## 2023-05-27 RX ADMIN — Medication 300 MILLIGRAM(S): at 06:30

## 2023-05-27 RX ADMIN — SODIUM CHLORIDE 1 GRAM(S): 9 INJECTION INTRAMUSCULAR; INTRAVENOUS; SUBCUTANEOUS at 21:21

## 2023-05-27 RX ADMIN — ENOXAPARIN SODIUM 40 MILLIGRAM(S): 100 INJECTION SUBCUTANEOUS at 13:54

## 2023-05-27 RX ADMIN — ARIPIPRAZOLE 15 MILLIGRAM(S): 15 TABLET ORAL at 13:55

## 2023-05-27 RX ADMIN — BUDESONIDE AND FORMOTEROL FUMARATE DIHYDRATE 2 PUFF(S): 160; 4.5 AEROSOL RESPIRATORY (INHALATION) at 10:01

## 2023-05-27 RX ADMIN — LOSARTAN POTASSIUM 100 MILLIGRAM(S): 100 TABLET, FILM COATED ORAL at 06:30

## 2023-05-27 RX ADMIN — Medication 166.67 MILLIGRAM(S): at 06:31

## 2023-05-27 NOTE — PROGRESS NOTE ADULT - SUBJECTIVE AND OBJECTIVE BOX
Anisha MendozaAlfaAnanda | PGY-2  Internal Medicine    OVERNIGHT EVENTS: no overnight events      SUBJECTIVE: Patient was examined at bedside this morning. Appeared comfortable. Overnight vitals and monitoring results were reviewed.      MEDICATIONS  (STANDING):  ARIPiprazole 15 milliGRAM(s) Oral daily  atorvastatin 40 milliGRAM(s) Oral at bedtime  budesonide 160 MICROgram(s)/formoterol 4.5 MICROgram(s) Inhaler 2 Puff(s) Inhalation two times a day  cholecalciferol 2000 Unit(s) Oral daily  Dakins Solution - 1/4 Strength 1 Application(s) Topical two times a day  diltiazem    milliGRAM(s) Oral daily  enoxaparin Injectable 40 milliGRAM(s) SubCutaneous every 24 hours  lidocaine   4% Patch 1 Patch Transdermal every 24 hours  losartan 100 milliGRAM(s) Oral daily  sodium chloride 1 Gram(s) Oral three times a day  vancomycin  IVPB 1250 milliGRAM(s) IV Intermittent every 8 hours    MEDICATIONS  (PRN):  acetaminophen     Tablet .. 975 milliGRAM(s) Oral every 6 hours PRN Temp greater or equal to 38C (100.4F), Mild Pain (1 - 3), Moderate Pain (4 - 6), Severe Pain (7 - 10)  albuterol    90 MICROgram(s) HFA Inhaler 2 Puff(s) Inhalation every 6 hours PRN Shortness of Breath and/or Wheezing  OLANZapine 5 milliGRAM(s) Oral every 6 hours PRN Agitation        T(F): 97.8 (05-27-23 @ 04:51), Max: 98.8 (05-26-23 @ 21:04)  HR: 87 (05-27-23 @ 04:51) (68 - 87)  BP: 141/87 (05-27-23 @ 04:51) (138/79 - 142/90)  BP(mean): --  RR: 18 (05-27-23 @ 04:51) (18 - 18)  SpO2: 100% (05-27-23 @ 04:51) (100% - 100%)    PHYSICAL EXAM:     GENERAL: NAD, lying in bed comfortably  HEAD:  Atraumatic, Normocephalic  EYES: EOMI, PERRLA, conjunctiva and sclera clear, no nystagmus noted  CHEST/LUNG: Clear to auscultation bilaterally; No rales, rhonchi, wheezing, or rubs. Unlabored respirations  HEART: Regular rate and rhythm; No murmurs, rubs, or gallops, normal S1/S2  ABDOMEN: normal bowel sounds; Soft, nontender, nondistended, no organomegaly   EXTREMITIES:  2+ Peripheral Pulses, brisk capillary refill. No clubbing, cyanosis, or edema  Neurological:  A&Ox3, no focal deficits     TELEMETRY:    LABS:                  Creatinine Trend: 0.79<--, 0.80<--, 0.67<--, 0.68<--, 0.75<--, 0.83<--  I&O's Summary    26 May 2023 07:01  -  27 May 2023 07:00  --------------------------------------------------------  IN: 1500 mL / OUT: 0 mL / NET: 1500 mL      BNP    RADIOLOGY & ADDITIONAL STUDIES:

## 2023-05-27 NOTE — PROGRESS NOTE ADULT - PROBLEM SELECTOR PLAN 10
Diet: CC  DVT: lovenox  Dispo: Unable to give IV antibiotics at [facility], may need to stay inpatient until 6/5 for IV abx unless recommendation for inpatient psychiatry changes

## 2023-05-27 NOTE — PROGRESS NOTE ADULT - PROBLEM SELECTOR PLAN 4
PT with h/o CVID requiring IgG infusions q1mo (last 4/13/2023).  OP Immunologist: Dr. Mitchell Boxer ([facility])  IGg level low at 489   s/p gammagard 75 grams on 5/8. D/w A&I fellow Dr. Kimura on 5/10, no need to re-dose with gammagard S/D at this time. Can resume usual monthly infusions after discharge.    - Allergy and Immunology recs appreciated   -recorded allergy to amoxicillin and penicillin however tolerated augmentin in the past, may re-address allergy if he needs a beta lactem

## 2023-05-27 NOTE — PROGRESS NOTE ADULT - PROBLEM SELECTOR PLAN 1
#Cellulitis c/b Sepsis  Pt with significant right gluteal wound progressive x1 weeks. P/w tachycardia, leukocytsosis c/w sepsis. Has h/o MRSA infection, requiring long course of vancomycin in the past. Historical Derm Bx negative for PG, but if persistent with poor wound healing, may reconsider further evaluation.  hx of left   left gluteal wound requiring debridement  surgery recommends multidisciplinary consults to determine etiology prior to debridement   TTE: ef: 60% unremarkable, endocardium could not be adequately visualized  5/6 BCX: MRSA; 5/8, 5/10 BCx: NGTD   s/p cefepime   UCx negative   - s/p wound care debridement 5/10, f/u path results, will f/u if any interventions needed inpatient  - Derm recs appreciated: not likely Pyoderma Gangrenosum  - c/w vancomycin (5/7-6/5)  - f/u ID recs Patient

## 2023-05-28 PROCEDURE — 99232 SBSQ HOSP IP/OBS MODERATE 35: CPT | Mod: GC

## 2023-05-28 RX ORDER — VANCOMYCIN HCL 1 G
1500 VIAL (EA) INTRAVENOUS EVERY 8 HOURS
Refills: 0 | Status: DISCONTINUED | OUTPATIENT
Start: 2023-05-28 | End: 2023-05-29

## 2023-05-28 RX ADMIN — SODIUM CHLORIDE 1 GRAM(S): 9 INJECTION INTRAMUSCULAR; INTRAVENOUS; SUBCUTANEOUS at 22:37

## 2023-05-28 RX ADMIN — SODIUM CHLORIDE 1 GRAM(S): 9 INJECTION INTRAMUSCULAR; INTRAVENOUS; SUBCUTANEOUS at 14:01

## 2023-05-28 RX ADMIN — BUDESONIDE AND FORMOTEROL FUMARATE DIHYDRATE 2 PUFF(S): 160; 4.5 AEROSOL RESPIRATORY (INHALATION) at 14:01

## 2023-05-28 RX ADMIN — ENOXAPARIN SODIUM 40 MILLIGRAM(S): 100 INJECTION SUBCUTANEOUS at 14:01

## 2023-05-28 RX ADMIN — SODIUM CHLORIDE 1 GRAM(S): 9 INJECTION INTRAMUSCULAR; INTRAVENOUS; SUBCUTANEOUS at 05:21

## 2023-05-28 RX ADMIN — ATORVASTATIN CALCIUM 40 MILLIGRAM(S): 80 TABLET, FILM COATED ORAL at 22:37

## 2023-05-28 RX ADMIN — Medication 2000 UNIT(S): at 14:00

## 2023-05-28 RX ADMIN — Medication 300 MILLIGRAM(S): at 05:21

## 2023-05-28 RX ADMIN — Medication 300 MILLIGRAM(S): at 14:12

## 2023-05-28 RX ADMIN — ARIPIPRAZOLE 15 MILLIGRAM(S): 15 TABLET ORAL at 14:00

## 2023-05-28 RX ADMIN — Medication 300 MILLIGRAM(S): at 22:37

## 2023-05-28 RX ADMIN — BUDESONIDE AND FORMOTEROL FUMARATE DIHYDRATE 2 PUFF(S): 160; 4.5 AEROSOL RESPIRATORY (INHALATION) at 21:44

## 2023-05-28 RX ADMIN — Medication 1 APPLICATION(S): at 06:11

## 2023-05-28 RX ADMIN — LOSARTAN POTASSIUM 100 MILLIGRAM(S): 100 TABLET, FILM COATED ORAL at 05:21

## 2023-05-28 RX ADMIN — Medication 166.67 MILLIGRAM(S): at 05:51

## 2023-05-28 NOTE — PROGRESS NOTE ADULT - PROBLEM SELECTOR PLAN 10
Diet: CC  DVT: lovenox  Dispo: Unable to give IV antibiotics at [facility], may need to stay inpatient until 6/5 for IV abx unless recommendation for inpatient psychiatry changes

## 2023-05-28 NOTE — PROGRESS NOTE ADULT - SUBJECTIVE AND OBJECTIVE BOX
SUBJECTIVE / OVERNIGHT EVENTS:    ARIPiprazole   15 milliGRAM(s) Oral (05-27-23 @ 13:55)    atorvastatin   40 milliGRAM(s) Oral (05-27-23 @ 21:21)    budesonide 160 MICROgram(s)/formoterol 4.5 MICROgram(s) Inhaler   2 Puff(s) Inhalation (05-27-23 @ 10:01)    cholecalciferol   2000 Unit(s) Oral (05-27-23 @ 13:55)    Dakins Solution - 1/4 Strength   1 Application(s) Topical (05-28-23 @ 06:11)   1 Application(s) Topical (05-27-23 @ 09:17)    diltiazem   CD   300 milliGRAM(s) Oral (05-28-23 @ 05:21)    enoxaparin Injectable   40 milliGRAM(s) SubCutaneous (05-27-23 @ 13:54)    losartan   100 milliGRAM(s) Oral (05-28-23 @ 05:21)    sodium chloride   1 Gram(s) Oral (05-28-23 @ 05:21)   1 Gram(s) Oral (05-27-23 @ 21:21)   1 Gram(s) Oral (05-27-23 @ 13:54)    vancomycin  IVPB   166.67 mL/Hr IV Intermittent (05-28-23 @ 05:51)   166.67 mL/Hr IV Intermittent (05-27-23 @ 22:50)   166.67 mL/Hr IV Intermittent (05-27-23 @ 14:25)    MEDICATIONS  (STANDING):  ARIPiprazole 15 milliGRAM(s) Oral daily  atorvastatin 40 milliGRAM(s) Oral at bedtime  budesonide 160 MICROgram(s)/formoterol 4.5 MICROgram(s) Inhaler 2 Puff(s) Inhalation two times a day  cholecalciferol 2000 Unit(s) Oral daily  Dakins Solution - 1/4 Strength 1 Application(s) Topical two times a day  diltiazem    milliGRAM(s) Oral daily  enoxaparin Injectable 40 milliGRAM(s) SubCutaneous every 24 hours  lidocaine   4% Patch 1 Patch Transdermal every 24 hours  losartan 100 milliGRAM(s) Oral daily  sodium chloride 1 Gram(s) Oral three times a day  vancomycin  IVPB 1500 milliGRAM(s) IV Intermittent every 8 hours    MEDICATIONS  (PRN):  acetaminophen     Tablet .. 975 milliGRAM(s) Oral every 6 hours PRN Temp greater or equal to 38C (100.4F), Mild Pain (1 - 3), Moderate Pain (4 - 6), Severe Pain (7 - 10)  albuterol    90 MICROgram(s) HFA Inhaler 2 Puff(s) Inhalation every 6 hours PRN Shortness of Breath and/or Wheezing  OLANZapine 5 milliGRAM(s) Oral every 6 hours PRN Agitation    I&O's Summary    27 May 2023 07:01  -  28 May 2023 07:00  --------------------------------------------------------  IN: 250 mL / OUT: 0 mL / NET: 250 mL    PHYSICAL EXAM:  Vital Signs Last 24 Hrs  T(C): 37.2 (28 May 2023 04:56), Max: 37.2 (28 May 2023 04:56)  T(F): 99 (28 May 2023 04:56), Max: 99 (28 May 2023 04:56)  HR: 80 (28 May 2023 04:56) (68 - 80)  BP: 143/88 (28 May 2023 04:56) (139/87 - 143/88)  BP(mean): --  RR: 18 (28 May 2023 04:56) (17 - 18)  SpO2: 98% (28 May 2023 04:56) (96% - 98%)    Parameters below as of 28 May 2023 04:56  Patient On (Oxygen Delivery Method): room air    GENERAL: No acute distress, well-developed  HEAD:  Atraumatic, Normocephalic  EYES: EOMI, PERRLA, conjunctiva and sclera clear  NECK: Supple, no lymphadenopathy, no JVD  CHEST/LUNG: CTAB; No wheezes, rales, or rhonchi  HEART: Regular rate and rhythm; No murmurs, rubs, or gallops  ABDOMEN: Soft, non-tender, non-distended; normal bowel sounds, no organomegaly  EXTREMITIES:  2+ peripheral pulses b/l, No clubbing, cyanosis, or edema  NEUROLOGY: A&O x 3, no focal deficits  SKIN: No rashes or lesions SUBJECTIVE / OVERNIGHT EVENTS: NAEON. This am pt sitting up in bed, NAD. Ectories H, cp, SOB.     ARIPiprazole   15 milliGRAM(s) Oral (05-27-23 @ 13:55)    atorvastatin   40 milliGRAM(s) Oral (05-27-23 @ 21:21)    budesonide 160 MICROgram(s)/formoterol 4.5 MICROgram(s) Inhaler   2 Puff(s) Inhalation (05-27-23 @ 10:01)    cholecalciferol   2000 Unit(s) Oral (05-27-23 @ 13:55)    Dakins Solution - 1/4 Strength   1 Application(s) Topical (05-28-23 @ 06:11)   1 Application(s) Topical (05-27-23 @ 09:17)    diltiazem   CD   300 milliGRAM(s) Oral (05-28-23 @ 05:21)    enoxaparin Injectable   40 milliGRAM(s) SubCutaneous (05-27-23 @ 13:54)    losartan   100 milliGRAM(s) Oral (05-28-23 @ 05:21)    sodium chloride   1 Gram(s) Oral (05-28-23 @ 05:21)   1 Gram(s) Oral (05-27-23 @ 21:21)   1 Gram(s) Oral (05-27-23 @ 13:54)    vancomycin  IVPB   166.67 mL/Hr IV Intermittent (05-28-23 @ 05:51)   166.67 mL/Hr IV Intermittent (05-27-23 @ 22:50)   166.67 mL/Hr IV Intermittent (05-27-23 @ 14:25)    MEDICATIONS  (STANDING):  ARIPiprazole 15 milliGRAM(s) Oral daily  atorvastatin 40 milliGRAM(s) Oral at bedtime  budesonide 160 MICROgram(s)/formoterol 4.5 MICROgram(s) Inhaler 2 Puff(s) Inhalation two times a day  cholecalciferol 2000 Unit(s) Oral daily  Dakins Solution - 1/4 Strength 1 Application(s) Topical two times a day  diltiazem    milliGRAM(s) Oral daily  enoxaparin Injectable 40 milliGRAM(s) SubCutaneous every 24 hours  lidocaine   4% Patch 1 Patch Transdermal every 24 hours  losartan 100 milliGRAM(s) Oral daily  sodium chloride 1 Gram(s) Oral three times a day  vancomycin  IVPB 1500 milliGRAM(s) IV Intermittent every 8 hours    MEDICATIONS  (PRN):  acetaminophen     Tablet .. 975 milliGRAM(s) Oral every 6 hours PRN Temp greater or equal to 38C (100.4F), Mild Pain (1 - 3), Moderate Pain (4 - 6), Severe Pain (7 - 10)  albuterol    90 MICROgram(s) HFA Inhaler 2 Puff(s) Inhalation every 6 hours PRN Shortness of Breath and/or Wheezing  OLANZapine 5 milliGRAM(s) Oral every 6 hours PRN Agitation    I&O's Summary    27 May 2023 07:01  -  28 May 2023 07:00  --------------------------------------------------------  IN: 250 mL / OUT: 0 mL / NET: 250 mL    PHYSICAL EXAM:  Vital Signs Last 24 Hrs  T(C): 37.2 (28 May 2023 04:56), Max: 37.2 (28 May 2023 04:56)  T(F): 99 (28 May 2023 04:56), Max: 99 (28 May 2023 04:56)  HR: 80 (28 May 2023 04:56) (68 - 80)  BP: 143/88 (28 May 2023 04:56) (139/87 - 143/88)  BP(mean): --  RR: 18 (28 May 2023 04:56) (17 - 18)  SpO2: 98% (28 May 2023 04:56) (96% - 98%)    Parameters below as of 28 May 2023 04:56  Patient On (Oxygen Delivery Method): room air    GENERAL: No acute distress  HEAD:  Atraumatic, Normocephalic  EYES: EOMI  NECK: Supple  CHEST/LUNG: CTAB; No wheezes, rales, or rhonchi  HEART: Regular rate and rhythm; No murmurs, rubs, or gallops  ABDOMEN: Soft, non-tender, non-distended; normal bowel sounds, no organomegaly  EXTREMITIES:  2+ peripheral pulses b/l, No clubbing, cyanosis, or edema  NEUROLOGY: A&O x 3, no focal deficits  SKIN: No rashes or lesions

## 2023-05-28 NOTE — PROGRESS NOTE ADULT - PROBLEM SELECTOR PLAN 4
PT with h/o CVID requiring IgG infusions q1mo (last 4/13/2023).  OP Immunologist: Dr. Mitchell Boxer ([facility])  IGg level low at 489   s/p gammagard 75 grams on 5/8. D/w A&I fellow Dr. Kimura on 5/10, no need to re-dose with gammagard S/D at this time. Can resume usual monthly infusions after discharge.    - Allergy and Immunology recs appreciated   -recorded allergy to amoxicillin and penicillin however tolerated augmentin in the past, may re-address allergy if he needs a beta lactem

## 2023-05-29 LAB — VANCOMYCIN TROUGH SERPL-MCNC: 22.3 UG/ML — HIGH (ref 10–20)

## 2023-05-29 PROCEDURE — 99233 SBSQ HOSP IP/OBS HIGH 50: CPT

## 2023-05-29 RX ORDER — VANCOMYCIN HCL 1 G
1250 VIAL (EA) INTRAVENOUS EVERY 8 HOURS
Refills: 0 | Status: DISCONTINUED | OUTPATIENT
Start: 2023-05-29 | End: 2023-06-01

## 2023-05-29 RX ADMIN — Medication 166.67 MILLIGRAM(S): at 13:20

## 2023-05-29 RX ADMIN — SODIUM CHLORIDE 1 GRAM(S): 9 INJECTION INTRAMUSCULAR; INTRAVENOUS; SUBCUTANEOUS at 21:53

## 2023-05-29 RX ADMIN — LOSARTAN POTASSIUM 100 MILLIGRAM(S): 100 TABLET, FILM COATED ORAL at 06:46

## 2023-05-29 RX ADMIN — Medication 1 APPLICATION(S): at 07:23

## 2023-05-29 RX ADMIN — Medication 166.67 MILLIGRAM(S): at 21:53

## 2023-05-29 RX ADMIN — SODIUM CHLORIDE 1 GRAM(S): 9 INJECTION INTRAMUSCULAR; INTRAVENOUS; SUBCUTANEOUS at 13:16

## 2023-05-29 RX ADMIN — Medication 1 APPLICATION(S): at 17:34

## 2023-05-29 RX ADMIN — Medication 2000 UNIT(S): at 13:16

## 2023-05-29 RX ADMIN — Medication 300 MILLIGRAM(S): at 06:46

## 2023-05-29 RX ADMIN — ARIPIPRAZOLE 15 MILLIGRAM(S): 15 TABLET ORAL at 13:16

## 2023-05-29 RX ADMIN — ATORVASTATIN CALCIUM 40 MILLIGRAM(S): 80 TABLET, FILM COATED ORAL at 21:53

## 2023-05-29 RX ADMIN — BUDESONIDE AND FORMOTEROL FUMARATE DIHYDRATE 2 PUFF(S): 160; 4.5 AEROSOL RESPIRATORY (INHALATION) at 21:57

## 2023-05-29 RX ADMIN — ENOXAPARIN SODIUM 40 MILLIGRAM(S): 100 INJECTION SUBCUTANEOUS at 13:16

## 2023-05-29 RX ADMIN — Medication 166.67 MILLIGRAM(S): at 06:48

## 2023-05-29 RX ADMIN — SODIUM CHLORIDE 1 GRAM(S): 9 INJECTION INTRAMUSCULAR; INTRAVENOUS; SUBCUTANEOUS at 06:47

## 2023-05-29 NOTE — PROGRESS NOTE ADULT - ATTENDING COMMENTS
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  #Sepsis from L gluteal MRSA abscess/cellulitis  will need to continue Vanco IV until 6/5  patient requires inpatient psychiatric facility for management - but unable to care for patient with PICC line d/t safety  continue to monitor clinical status    Dispo: inpt psych once abx has been completed

## 2023-05-29 NOTE — PROGRESS NOTE ADULT - SUBJECTIVE AND OBJECTIVE BOX
Anisha KellyAlfaAnanda | PGY-2  Internal Medicine    OVERNIGHT EVENTS: no overnight events      SUBJECTIVE: Patient was examined at bedside this morning. Appeared comfortable. Overnight vitals and monitoring results were reviewed.       MEDICATIONS  (STANDING):  ARIPiprazole 15 milliGRAM(s) Oral daily  atorvastatin 40 milliGRAM(s) Oral at bedtime  budesonide 160 MICROgram(s)/formoterol 4.5 MICROgram(s) Inhaler 2 Puff(s) Inhalation two times a day  cholecalciferol 2000 Unit(s) Oral daily  Dakins Solution - 1/4 Strength 1 Application(s) Topical two times a day  diltiazem    milliGRAM(s) Oral daily  enoxaparin Injectable 40 milliGRAM(s) SubCutaneous every 24 hours  lidocaine   4% Patch 1 Patch Transdermal every 24 hours  losartan 100 milliGRAM(s) Oral daily  sodium chloride 1 Gram(s) Oral three times a day  vancomycin  IVPB 1250 milliGRAM(s) IV Intermittent every 8 hours    MEDICATIONS  (PRN):  acetaminophen     Tablet .. 975 milliGRAM(s) Oral every 6 hours PRN Temp greater or equal to 38C (100.4F), Mild Pain (1 - 3), Moderate Pain (4 - 6), Severe Pain (7 - 10)  albuterol    90 MICROgram(s) HFA Inhaler 2 Puff(s) Inhalation every 6 hours PRN Shortness of Breath and/or Wheezing  OLANZapine 5 milliGRAM(s) Oral every 6 hours PRN Agitation        T(F): 97.5 (05-28-23 @ 21:37), Max: 97.5 (05-28-23 @ 21:37)  HR: 66 (05-28-23 @ 21:37) (66 - 72)  BP: 119/66 (05-28-23 @ 21:37) (119/66 - 159/87)  BP(mean): --  RR: 18 (05-28-23 @ 21:37) (18 - 18)  SpO2: 96% (05-28-23 @ 21:37) (96% - 100%)    PHYSICAL EXAM:     GENERAL: NAD, lying in bed comfortably  HEAD:  Atraumatic, Normocephalic  EYES: EOMI, PERRLA, conjunctiva and sclera clear, no nystagmus noted  CHEST/LUNG: Clear to auscultation bilaterally; No rales, rhonchi, wheezing, or rubs. Unlabored respirations  HEART: Regular rate and rhythm; No murmurs, rubs, or gallops, normal S1/S2  ABDOMEN: normal bowel sounds; Soft, nontender, nondistended, no organomegaly   EXTREMITIES:  2+ Peripheral Pulses, brisk capillary refill. No clubbing, cyanosis, or edema  Neurological:  A&Ox3, no focal deficits     TELEMETRY:    LABS:                  Creatinine Trend: 0.79<--, 0.80<--, 0.67<--, 0.68<--, 0.75<--, 0.83<--  I&O's Summary    28 May 2023 07:01  -  29 May 2023 07:00  --------------------------------------------------------  IN: 500 mL / OUT: 450 mL / NET: 50 mL      BNP    RADIOLOGY & ADDITIONAL STUDIES:

## 2023-05-29 NOTE — PROGRESS NOTE ADULT - PROBLEM SELECTOR PLAN 4
PT with h/o CVID requiring IgG infusions q1mo (last 4/13/2023).  OP Immunologist: Dr. Mitchell Boxer ([facility])  IGg level low at 489   s/p gammagard 75 grams on 5/8. D/w A&I fellow Dr. Kimura on 5/10, no need to re-dose with gammagard S/D at this time. Can resume usual monthly infusions after discharge.    - Allergy and Immunology recs appreciated   -recorded allergy to amoxicillin and penicillin however tolerated augmentin in the past, may re-address allergy if he needs a beta lactem

## 2023-05-29 NOTE — PROGRESS NOTE ADULT - PROBLEM SELECTOR PLAN 10
Diet: CC  DVT: lovenox  Dispo: Unable to give IV antibiotics at [facility], may need to stay inpatient until 6/5 for IV abx unless recommendation for inpatient psychiatry changes

## 2023-05-30 LAB
ANION GAP SERPL CALC-SCNC: 11 MMOL/L — SIGNIFICANT CHANGE UP (ref 7–14)
BUN SERPL-MCNC: 10 MG/DL — SIGNIFICANT CHANGE UP (ref 7–23)
CALCIUM SERPL-MCNC: 9.2 MG/DL — SIGNIFICANT CHANGE UP (ref 8.4–10.5)
CHLORIDE SERPL-SCNC: 91 MMOL/L — LOW (ref 98–107)
CO2 SERPL-SCNC: 26 MMOL/L — SIGNIFICANT CHANGE UP (ref 22–31)
CREAT SERPL-MCNC: 0.88 MG/DL — SIGNIFICANT CHANGE UP (ref 0.5–1.3)
EGFR: 102 ML/MIN/1.73M2 — SIGNIFICANT CHANGE UP
GLUCOSE SERPL-MCNC: 94 MG/DL — SIGNIFICANT CHANGE UP (ref 70–99)
HCT VFR BLD CALC: 33.2 % — LOW (ref 39–50)
HGB BLD-MCNC: 11.1 G/DL — LOW (ref 13–17)
MAGNESIUM SERPL-MCNC: 1.8 MG/DL — SIGNIFICANT CHANGE UP (ref 1.6–2.6)
MCHC RBC-ENTMCNC: 27 PG — SIGNIFICANT CHANGE UP (ref 27–34)
MCHC RBC-ENTMCNC: 33.4 GM/DL — SIGNIFICANT CHANGE UP (ref 32–36)
MCV RBC AUTO: 80.8 FL — SIGNIFICANT CHANGE UP (ref 80–100)
NRBC # BLD: 0 /100 WBCS — SIGNIFICANT CHANGE UP (ref 0–0)
NRBC # FLD: 0 K/UL — SIGNIFICANT CHANGE UP (ref 0–0)
PHOSPHATE SERPL-MCNC: 4.4 MG/DL — SIGNIFICANT CHANGE UP (ref 2.5–4.5)
PLATELET # BLD AUTO: 224 K/UL — SIGNIFICANT CHANGE UP (ref 150–400)
POTASSIUM SERPL-MCNC: 4.5 MMOL/L — SIGNIFICANT CHANGE UP (ref 3.5–5.3)
POTASSIUM SERPL-SCNC: 4.5 MMOL/L — SIGNIFICANT CHANGE UP (ref 3.5–5.3)
RBC # BLD: 4.11 M/UL — LOW (ref 4.2–5.8)
RBC # FLD: 15.9 % — HIGH (ref 10.3–14.5)
SODIUM SERPL-SCNC: 128 MMOL/L — LOW (ref 135–145)
VANCOMYCIN FLD-MCNC: 18 UG/ML — SIGNIFICANT CHANGE UP
VANCOMYCIN TROUGH SERPL-MCNC: 17.3 UG/ML — SIGNIFICANT CHANGE UP (ref 10–20)
WBC # BLD: 3.79 K/UL — LOW (ref 3.8–10.5)
WBC # FLD AUTO: 3.79 K/UL — LOW (ref 3.8–10.5)

## 2023-05-30 PROCEDURE — 99232 SBSQ HOSP IP/OBS MODERATE 35: CPT | Mod: GC

## 2023-05-30 RX ADMIN — Medication 1 APPLICATION(S): at 06:57

## 2023-05-30 RX ADMIN — Medication 166.67 MILLIGRAM(S): at 22:02

## 2023-05-30 RX ADMIN — Medication 166.67 MILLIGRAM(S): at 06:24

## 2023-05-30 RX ADMIN — BUDESONIDE AND FORMOTEROL FUMARATE DIHYDRATE 2 PUFF(S): 160; 4.5 AEROSOL RESPIRATORY (INHALATION) at 11:42

## 2023-05-30 RX ADMIN — ATORVASTATIN CALCIUM 40 MILLIGRAM(S): 80 TABLET, FILM COATED ORAL at 22:02

## 2023-05-30 RX ADMIN — ARIPIPRAZOLE 15 MILLIGRAM(S): 15 TABLET ORAL at 11:43

## 2023-05-30 RX ADMIN — Medication 2000 UNIT(S): at 13:00

## 2023-05-30 RX ADMIN — SODIUM CHLORIDE 1 GRAM(S): 9 INJECTION INTRAMUSCULAR; INTRAVENOUS; SUBCUTANEOUS at 22:02

## 2023-05-30 RX ADMIN — LOSARTAN POTASSIUM 100 MILLIGRAM(S): 100 TABLET, FILM COATED ORAL at 06:23

## 2023-05-30 RX ADMIN — Medication 300 MILLIGRAM(S): at 06:23

## 2023-05-30 RX ADMIN — ENOXAPARIN SODIUM 40 MILLIGRAM(S): 100 INJECTION SUBCUTANEOUS at 13:00

## 2023-05-30 RX ADMIN — SODIUM CHLORIDE 1 GRAM(S): 9 INJECTION INTRAMUSCULAR; INTRAVENOUS; SUBCUTANEOUS at 15:44

## 2023-05-30 RX ADMIN — Medication 166.67 MILLIGRAM(S): at 15:43

## 2023-05-30 RX ADMIN — SODIUM CHLORIDE 1 GRAM(S): 9 INJECTION INTRAMUSCULAR; INTRAVENOUS; SUBCUTANEOUS at 06:24

## 2023-05-30 NOTE — PROGRESS NOTE ADULT - ATTENDING COMMENTS
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    #Sepsis from L gluteal MRSA abscess/cellulitis  will need to continue Vanco IV until 6/5  patient requires inpatient psychiatric facility for management - but unable to care for patient with PICC line d/t safety  continue to monitor clinical status    Dispo: inpt psych once abx has been completed .

## 2023-05-30 NOTE — PROGRESS NOTE ADULT - PROBLEM SELECTOR PLAN 10
Diet: CC  DVT: lovenox  Dispo: Unable to give IV antibiotics at [facility], may need to stay inpatient until 6/5 for IV abx unless recommendation for inpatient psychiatry changes

## 2023-05-30 NOTE — PROGRESS NOTE ADULT - PROBLEM SELECTOR PLAN 4
PT with h/o CVID requiring IgG infusions q1mo (last 4/13/2023).  OP Immunologist: Dr. Mitchell Boxer ([facility])  IGg level low at 489   s/p gammagard 75 grams on 5/8. D/w A&I fellow Dr. Kimura on 5/10, no need to re-dose with gammagard S/D at this time. Can resume usual monthly infusions after discharge.    - Allergy and Immunology recs appreciated   -recorded allergy to amoxicillin and penicillin however tolerated augmentin in the past, may re-address allergy if he needs a beta lactem

## 2023-05-30 NOTE — PROGRESS NOTE ADULT - SUBJECTIVE AND OBJECTIVE BOX
Anisha MendozaAlfaAnanda | PGY-2  Internal Medicine    OVERNIGHT EVENTS: no overnight events      SUBJECTIVE: Patient was examined at bedside this morning. Appeared comfortable. Overnight vitals and monitoring results were reviewed.       MEDICATIONS  (STANDING):  ARIPiprazole 15 milliGRAM(s) Oral daily  atorvastatin 40 milliGRAM(s) Oral at bedtime  budesonide 160 MICROgram(s)/formoterol 4.5 MICROgram(s) Inhaler 2 Puff(s) Inhalation two times a day  cholecalciferol 2000 Unit(s) Oral daily  Dakins Solution - 1/4 Strength 1 Application(s) Topical two times a day  diltiazem    milliGRAM(s) Oral daily  enoxaparin Injectable 40 milliGRAM(s) SubCutaneous every 24 hours  lidocaine   4% Patch 1 Patch Transdermal every 24 hours  losartan 100 milliGRAM(s) Oral daily  sodium chloride 1 Gram(s) Oral three times a day  vancomycin  IVPB 1250 milliGRAM(s) IV Intermittent every 8 hours    MEDICATIONS  (PRN):  acetaminophen     Tablet .. 975 milliGRAM(s) Oral every 6 hours PRN Temp greater or equal to 38C (100.4F), Mild Pain (1 - 3), Moderate Pain (4 - 6), Severe Pain (7 - 10)  albuterol    90 MICROgram(s) HFA Inhaler 2 Puff(s) Inhalation every 6 hours PRN Shortness of Breath and/or Wheezing  OLANZapine 5 milliGRAM(s) Oral every 6 hours PRN Agitation        T(F): 97.2 (05-30-23 @ 06:15), Max: 97.8 (05-29-23 @ 15:58)  HR: 76 (05-30-23 @ 06:15) (73 - 76)  BP: 156/76 (05-30-23 @ 06:15) (130/84 - 156/76)  BP(mean): --  RR: 18 (05-30-23 @ 06:15) (18 - 18)  SpO2: 96% (05-30-23 @ 06:15) (95% - 96%)    PHYSICAL EXAM:     GENERAL: NAD, lying in bed comfortably  HEAD:  Atraumatic, Normocephalic  EYES: EOMI, PERRLA, conjunctiva and sclera clear, no nystagmus noted  CHEST/LUNG: Clear to auscultation bilaterally; No rales, rhonchi, wheezing, or rubs. Unlabored respirations  HEART: Regular rate and rhythm; No murmurs, rubs, or gallops, normal S1/S2  ABDOMEN: normal bowel sounds; Soft, nontender, nondistended, no organomegaly   EXTREMITIES:  2+ Peripheral Pulses, brisk capillary refill. No clubbing, cyanosis, or edema  Neurological:  A&Ox3, no focal deficits     TELEMETRY:    LABS:                  Creatinine Trend: 0.79<--, 0.80<--, 0.67<--, 0.68<--, 0.75<--, 0.83<--  I&O's Summary    BNP    RADIOLOGY & ADDITIONAL STUDIES:

## 2023-05-31 LAB
ANION GAP SERPL CALC-SCNC: 11 MMOL/L — SIGNIFICANT CHANGE UP (ref 7–14)
BUN SERPL-MCNC: 10 MG/DL — SIGNIFICANT CHANGE UP (ref 7–23)
CALCIUM SERPL-MCNC: 9.3 MG/DL — SIGNIFICANT CHANGE UP (ref 8.4–10.5)
CHLORIDE SERPL-SCNC: 95 MMOL/L — LOW (ref 98–107)
CO2 SERPL-SCNC: 26 MMOL/L — SIGNIFICANT CHANGE UP (ref 22–31)
CREAT ?TM UR-MCNC: 24 MG/DL — SIGNIFICANT CHANGE UP
CREAT SERPL-MCNC: 0.86 MG/DL — SIGNIFICANT CHANGE UP (ref 0.5–1.3)
EGFR: 102 ML/MIN/1.73M2 — SIGNIFICANT CHANGE UP
GLUCOSE SERPL-MCNC: 85 MG/DL — SIGNIFICANT CHANGE UP (ref 70–99)
POTASSIUM SERPL-MCNC: 4.2 MMOL/L — SIGNIFICANT CHANGE UP (ref 3.5–5.3)
POTASSIUM SERPL-SCNC: 4.2 MMOL/L — SIGNIFICANT CHANGE UP (ref 3.5–5.3)
SODIUM SERPL-SCNC: 132 MMOL/L — LOW (ref 135–145)
SODIUM UR-SCNC: < 20 MMOL/L — SIGNIFICANT CHANGE UP
UUN UR-MCNC: 138 MG/DL — SIGNIFICANT CHANGE UP
VANCOMYCIN TROUGH SERPL-MCNC: 19.7 UG/ML — SIGNIFICANT CHANGE UP (ref 10–20)

## 2023-05-31 PROCEDURE — 99232 SBSQ HOSP IP/OBS MODERATE 35: CPT

## 2023-05-31 PROCEDURE — 99232 SBSQ HOSP IP/OBS MODERATE 35: CPT | Mod: GC

## 2023-05-31 RX ADMIN — SODIUM CHLORIDE 1 GRAM(S): 9 INJECTION INTRAMUSCULAR; INTRAVENOUS; SUBCUTANEOUS at 22:06

## 2023-05-31 RX ADMIN — BUDESONIDE AND FORMOTEROL FUMARATE DIHYDRATE 2 PUFF(S): 160; 4.5 AEROSOL RESPIRATORY (INHALATION) at 11:47

## 2023-05-31 RX ADMIN — Medication 166.67 MILLIGRAM(S): at 22:07

## 2023-05-31 RX ADMIN — ENOXAPARIN SODIUM 40 MILLIGRAM(S): 100 INJECTION SUBCUTANEOUS at 12:39

## 2023-05-31 RX ADMIN — LOSARTAN POTASSIUM 100 MILLIGRAM(S): 100 TABLET, FILM COATED ORAL at 06:52

## 2023-05-31 RX ADMIN — Medication 1 APPLICATION(S): at 06:52

## 2023-05-31 RX ADMIN — Medication 166.67 MILLIGRAM(S): at 15:21

## 2023-05-31 RX ADMIN — SODIUM CHLORIDE 1 GRAM(S): 9 INJECTION INTRAMUSCULAR; INTRAVENOUS; SUBCUTANEOUS at 16:39

## 2023-05-31 RX ADMIN — Medication 2000 UNIT(S): at 12:40

## 2023-05-31 RX ADMIN — ARIPIPRAZOLE 15 MILLIGRAM(S): 15 TABLET ORAL at 11:48

## 2023-05-31 RX ADMIN — Medication 166.67 MILLIGRAM(S): at 06:52

## 2023-05-31 RX ADMIN — SODIUM CHLORIDE 1 GRAM(S): 9 INJECTION INTRAMUSCULAR; INTRAVENOUS; SUBCUTANEOUS at 06:52

## 2023-05-31 RX ADMIN — ATORVASTATIN CALCIUM 40 MILLIGRAM(S): 80 TABLET, FILM COATED ORAL at 22:06

## 2023-05-31 RX ADMIN — Medication 300 MILLIGRAM(S): at 06:52

## 2023-05-31 NOTE — PROGRESS NOTE ADULT - ASSESSMENT
Assessment/Plan: Mr. 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     Wound care f/u for right buttock abscess with MRSA cellulitis s/p debridement by wound care team on 5/10 and left lateral lower leg chronic wound with h/o MRSA.    Impression  -Left leg wound stable,  mostly reepithelialization migrating over wound bed. Small opening exposing 100% pink granular tissue. Scant serous drainage.   - Wounds improving in dimension and tissue type  -Patient denies tenderness   - Right buttock abscess with Cellulitis continues to improve; fading erythema; periwound skin with immediate induration along wound edge. No fluctuance/crepitus/no increased warmth/no tenderness. 9 o'clock non-ulcerated slightly raised nodule remains.  - Wound cultures reviewed, (+) MRSA, abx management per primary team.  - Right buttock pathology reviewed- "fragments of skin with ulceration and acut eand chronic inflammation, fragments of necrotic connective tissue with acute suppurative inflammation".     Recommendations:   -Right buttock- cleanse wound with  Dakins 1/4 strength, rinse wound and periwound skin with NS. Apply Liquid barrier film to periwound skin. Apply Aquacel Ag hydrofiber, cover with silicone foam with border. Change daily. Upon discharge may change every other day and prn  -Left lateral lower leg chronic wound: Clean wound with NS. Pat dry. Apply liquid barrier film to periwound skin, given scant drainage no longer need Aquacel hydrofiber, recommend moisture wound healing with Silicone foam with border. Change daily or PRN if soiled.   - Abx management per primary team/ID  - Smoking cessation  - Glucose control per primary team  - Appreciate nutrition recommendations  -Patient continues to be motivated, endorses he is planning to continue to eat a balanced diet and reduce tobacco use.   - Continue low airloss support surface while inpatient.  - Encourage turning and postioning; mobility to offload pressure from lateral leg and right buttock  -Patient continent of stool and urine, given body habitus continues to be at risk for moisture associated dermatitis. Encourage showers if feasible once discharge (unable to shower at this time given contact isolations), apply Maverick moisture barrier cream to inner buttocks and gluteal cleft. Apply twice a day.     **Upon discharge follow up at outpatient [facility]. [address] [place] [address]. [phone].    Discussed findings and plan with primary team, primary RN, and patient. All questions and concerns addressed to meet patient's satisfaction. Discussed smoking cession and importance of glucose control. Patient expressed wanting to change lifestyle habits including weight loss, better glucose control and smoking cessation upon discharge. Consider diabetes education, nutrition recommendations appreciated, continue education on smoking cessation.    Will continue to follow periodically throughout hospitalization, please reconsult early as needed.  Remainder of care per primary team.    KERI Bowie, CWON   pager #[phone]     If after 4PM or before 7:30AM on Mon-Friday or weekend/holiday please contact general surgery for urgent matters.   Team A- [id number]   Team B- [id number]  For non-urgent matters e-mail [email or web address]    I spent 35 minutes face-to-face with this patient of which more than 50% of the time was spent counseling/coordinating care of this patient.   Assessment/Plan: Mr. 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     Wound care f/u for right buttock abscess with MRSA cellulitis s/p debridement by wound care team on 5/10 and left lateral lower leg chronic wound with h/o MRSA.    Impression  -Left leg wound stable,  mostly reepithelialization migrating over wound bed. Small opening exposing 100% pink granular tissue. Scant serous drainage.   - Wounds improving in dimension and tissue type  -Patient denies tenderness   - Right buttock abscess with Cellulitis continues to improve; fading erythema; periwound skin with immediate induration along wound edge. No fluctuance/crepitus/no increased warmth/no tenderness. 9 o'clock non-ulcerated slightly raised nodule remains.  - Wound cultures reviewed, (+) MRSA, abx management per primary team.  - Right buttock pathology reviewed- "fragments of skin with ulceration and acut eand chronic inflammation, fragments of necrotic connective tissue with acute suppurative inflammation".     Recommendations:   -Right buttock- cleanse wound with  Dakins 1/4 strength, rinse wound and periwound skin with NS. Apply Liquid barrier film to periwound skin. Apply Aquacel Ag hydrofiber, cover with silicone foam with border. Change daily. Upon discharge may change every other day and prn  -Left lateral lower leg chronic wound: Clean wound with NS. Pat dry. Apply liquid barrier film to periwound skin, given scant drainage no longer need Aquacel hydrofiber, recommend moisture wound healing with Silicone foam with border. Change daily or PRN if soiled. Once discharge may change every other day or prn if soiled.   - Abx management per primary team/ID  - Smoking cessation  - Glucose control per primary team  - Appreciate nutrition recommendations  -Patient continues to be motivated, endorses he is planning to continue to eat a balanced diet and reduce tobacco use.   - Continue low airloss support surface while inpatient.  - Encourage turning and postioning; mobility to offload pressure from lateral leg and right buttock  -Patient continent of stool and urine, given body habitus continues to be at risk for moisture associated dermatitis. Encourage showers if feasible once discharge (unable to shower at this time given contact isolations), apply Maverick moisture barrier cream to inner buttocks and gluteal cleft. Apply twice a day.     **Upon discharge follow up at outpatient [facility] [place]. [address] [place] [address]. [phone].    Discussed findings and plan with primary team, primary RN, and patient. All questions and concerns addressed to meet patient's satisfaction. Discussed smoking cession and importance of glucose control. Patient expressed wanting to change lifestyle habits including weight loss, better glucose control and smoking cessation upon discharge. Consider diabetes education, nutrition recommendations appreciated, continue education on smoking cessation.    Will continue to follow periodically throughout hospitalization, please reconsult early as needed.  Remainder of care per primary team.    KERI Bowie, CWON   pager #[phone]     If after 4PM or before 7:30AM on Mon-Friday or weekend/holiday please contact general surgery for urgent matters.   Team A- [id number]   Team B- [id number]  For non-urgent matters e-mail [email or web address]    I spent 35 minutes face-to-face with this patient of which more than 50% of the time was spent counseling/coordinating care of this patient.

## 2023-05-31 NOTE — PROGRESS NOTE ADULT - SUBJECTIVE AND OBJECTIVE BOX
[facility]-- WOUND TEAM -- FOLLOW UP NOTE  --------------------------------------------------------------------------------    subjective: Patient seen and examined at bedside. Patient endorses feeling well. Endorses he will be discharge next Monday and he is " happy". He endorses his wounds continue to improve. Endorses he is going home with VNS daily. Denies  N/V.     Interval HPI/24 hour events:   As per chart review, patient pending discharge to [facility] once patient completes IV antibiotics on 6/5.   Chart reviewed including labs and relevant images      Diet:  Diet, Consistent Carbohydrate w/Evening Snack:   1200mL Fluid Restriction ([id number]) (05-31-23 @ 10:25)      ROS: General/ SKIN/ see above   all other systems negative    ALLERGIES & MEDICATIONS  --------------------------------------------------------------------------------  Allergies    amoxicillin (Fever)  penicillin (Rash)    Intolerances    STANDING INPATIENT MEDICATIONS    ARIPiprazole 15 milliGRAM(s) Oral daily  atorvastatin 40 milliGRAM(s) Oral at bedtime  budesonide 160 MICROgram(s)/formoterol 4.5 MICROgram(s) Inhaler 2 Puff(s) Inhalation two times a day  cholecalciferol 2000 Unit(s) Oral daily  Dakins Solution - 1/4 Strength 1 Application(s) Topical two times a day  diltiazem    milliGRAM(s) Oral daily  enoxaparin Injectable 40 milliGRAM(s) SubCutaneous every 24 hours  lidocaine   4% Patch 1 Patch Transdermal every 24 hours  losartan 100 milliGRAM(s) Oral daily  sodium chloride 1 Gram(s) Oral three times a day  vancomycin  IVPB 1250 milliGRAM(s) IV Intermittent every 8 hours    PRN INPATIENT MEDICATION  acetaminophen     Tablet .. 975 milliGRAM(s) Oral every 6 hours PRN  albuterol    90 MICROgram(s) HFA Inhaler 2 Puff(s) Inhalation every 6 hours PRN  OLANZapine 5 milliGRAM(s) Oral every 6 hours PRN      Vital signs:  T(C): 36.4 (06-01-23 @ 04:43), Max: 36.9 (05-31-23 @ 06:30)  HR: 63 (06-01-23 @ 04:43) (62 - 71)  BP: 127/67 (06-01-23 @ 04:43) (127/67 - 143/75)  RR: 18 (06-01-23 @ 04:43) (16 - 18)  SpO2: 97% (06-01-23 @ 04:43) (97% - 100%)  Wt(kg): -257 lbs (24 May 23)    05-30-23 @ 07:01  -  05-31-23 @ 07:00  --------------------------------------------------------  IN: 250 mL / OUT: 0 mL / NET: 250 mL    05-31-23 @ 07:01  -  06-01-23 @ 05:19  --------------------------------------------------------  IN: 250 mL / OUT: 0 mL / NET: 250 mL      PHYSICAL EXAM  Constitutional: On contact isolation precautions.  NAD, alert and oriented x 3, lying in bed turned to the side.  + low airloss support surface, seat cushion on chair in room.  HEENT: NC/NT, nonicteric, mucosa moist, no teeth  Respiratory: supplemental oxygen via NC, nonlabored, equal chest expansion  Cardiac: Rate regular   Abd: Obese, soft, non-distended, non-tender.   ext: No erythema/edema. Warm, sensate. Up in room independently. Turn in bed independently    INTEGUMENT/WOUND(S):  Left buttock hypopigmentation, soft tissue contraction.  R Buttock wound with MRSA   - measuring- Entire affected area including areas of denuded epidermis, non draining nodules (erosive) around wound measuring [id number]. Open area measures [id number]. prev 3.5cmx6.5cmx0.5cm  Wound edge appear erosive, well defined, and irregular, with multiple immediately adjacent satellite ulcerations healing dry fibrinous pin-point crust.   - Wound base pink-red moist granular with small yellow fibrinous tissue.   - Small-moderate serosanguinous drainage, no odor.   - Periwound skin with mild induration immediately surrounding wound, blanching erythema resolving. Non tenderness/no crepitus/no fluctuance. Small non ulcerated, slightly raised nodular lesion at 9 o'clock 3 cm from wound edge 0.3cmx0.3cmx0.  L Lateral lower leg gaiter region chronic wound, h/o MRSA  -Wound with soft tissue contracture, slightly raised, open ulcer measuring 0.5cmx0.7cmx0.1cm.   - 90% reepithelialized, 10% pink-moist granulation prev (0.5cmx0.5cmx0.1cm)    - Scant serous drainage, no odor  - Periwound skin with hyperkeratosis and hyperpigmentation; no erythema, induration, no fluctuance, no crepitus, no bullae  Psych: calm, cooperative. Engaged.         LABS/ CULTURES/ RADIOLOGY:              11.1   3.79  >-----------<  224      [05-30-23 @ 13:30]              33.2     132  |  95  |  10  ----------------------------<  85      [05-31-23 @ 04:04]  4.2   |  26  |  0.86        Ca     9.3     [05-31-23 @ 04:04]      Mg     1.80     [05-30-23 @ 13:30]      Phos  4.4     [05-30-23 @ 13:30]      CAPILLARY BLOOD GLUCOSE    Vitamin D, 25-Hydroxy: 30.3 ng/mL (05-08-23 @ 05:55)      A1C with Estimated Average Glucose Result: 6.3 % (05-08-23 @ 05:55)  A1C with Estimated Average Glucose Result: 6.3 % (02-05-23 @ 10:43)

## 2023-05-31 NOTE — PROGRESS NOTE ADULT - ATTENDING COMMENTS
58 yo male with PMHx Schizoaffective D/O, CVID/hypogammaglobulinemia on monthly IVIG, HTN, DM2, p/w sepsis from Lt gluteal MRSA abscess/cellulitis – failed previous treatments c/b MRSA bacteremia s/p Debridement 5/10, Hyponatremia - SIADH.    Sepsis from L gluteal MRSA abscess/cellulitis  will need to continue Vanco IV until 6/5  patient requires inpatient psychiatric facility for management - but unable to care for patient with PICC line d/t safety  continue to monitor clinical status    SIADH:  - noted low Na 5/30, improved on 5/31.  - Change to daily fluid restriction to 1.2L/day  - Check urine lytes, serum/Urine Osm

## 2023-05-31 NOTE — PROGRESS NOTE ADULT - PROBLEM SELECTOR PLAN 4
PT with h/o CVID requiring IgG infusions q1mo (last 4/13/2023).  OP Immunologist: Dr. Mitchell Boxer ([facility])  IGg level low at 489   s/p gammagard 75 grams on 5/8. D/w A&I fellow Dr. Kimura on 5/10, no need to re-dose with gammagard S/D at this time. Can resume usual monthly infusions after discharge.    - Allergy and Immunology recs appreciated   -recorded allergy to amoxicillin and penicillin however tolerated augmentin in the past, may re-address allergy if he needs a beta lactem -C/W Home Losartan 100mg PO QD  -c/w  Home Diltiazem ER 300mg PO QD

## 2023-05-31 NOTE — PROGRESS NOTE ADULT - SUBJECTIVE AND OBJECTIVE BOX
Anisha MendozaAlfaAnanda | PGY-2  Internal Medicine    OVERNIGHT EVENTS: no overnight events      SUBJECTIVE: Patient was examined at bedside this morning. Appeared comfortable. Overnight vitals and monitoring results were reviewed.      MEDICATIONS  (STANDING):  ARIPiprazole 15 milliGRAM(s) Oral daily  atorvastatin 40 milliGRAM(s) Oral at bedtime  budesonide 160 MICROgram(s)/formoterol 4.5 MICROgram(s) Inhaler 2 Puff(s) Inhalation two times a day  cholecalciferol 2000 Unit(s) Oral daily  Dakins Solution - 1/4 Strength 1 Application(s) Topical two times a day  diltiazem    milliGRAM(s) Oral daily  enoxaparin Injectable 40 milliGRAM(s) SubCutaneous every 24 hours  lidocaine   4% Patch 1 Patch Transdermal every 24 hours  losartan 100 milliGRAM(s) Oral daily  sodium chloride 1 Gram(s) Oral three times a day  vancomycin  IVPB 1250 milliGRAM(s) IV Intermittent every 8 hours    MEDICATIONS  (PRN):  acetaminophen     Tablet .. 975 milliGRAM(s) Oral every 6 hours PRN Temp greater or equal to 38C (100.4F), Mild Pain (1 - 3), Moderate Pain (4 - 6), Severe Pain (7 - 10)  albuterol    90 MICROgram(s) HFA Inhaler 2 Puff(s) Inhalation every 6 hours PRN Shortness of Breath and/or Wheezing  OLANZapine 5 milliGRAM(s) Oral every 6 hours PRN Agitation        T(F): 98.5 (05-31-23 @ 06:30), Max: 98.5 (05-31-23 @ 06:30)  HR: 71 (05-31-23 @ 06:30) (64 - 71)  BP: 140/83 (05-31-23 @ 06:30) (123/65 - 140/83)  BP(mean): --  RR: 18 (05-31-23 @ 06:30) (18 - 18)  SpO2: 100% (05-31-23 @ 06:30) (95% - 100%)    PHYSICAL EXAM:     GENERAL: NAD, lying in bed comfortably  HEAD:  Atraumatic, Normocephalic  EYES: EOMI, PERRLA, conjunctiva and sclera clear, no nystagmus noted  ENT: Moist mucous membranes,   NECK: Supple, No JVD, trachea midline  CHEST/LUNG: Clear to auscultation bilaterally; No rales, rhonchi, wheezing, or rubs. Unlabored respirations  HEART: Regular rate and rhythm; No murmurs, rubs, or gallops, normal S1/S2  ABDOMEN: normal bowel sounds; Soft, nontender, nondistended, no organomegaly   EXTREMITIES:  2+ Peripheral Pulses, brisk capillary refill. No clubbing, cyanosis, or edema  MSK: No gross deformities noted   Neurological:  A&Ox3, no focal deficits   SKIN: No rashes or lesions  PSYCH: Normal mood, affect     TELEMETRY:    LABS:                        11.1   3.79  )-----------( 224      ( 30 May 2023 13:30 )             33.2     05-31    132<L>  |  95<L>  |  10  ----------------------------<  85  4.2   |  26  |  0.86    Ca    9.3      31 May 2023 04:04  Phos  4.4     05-30  Mg     1.80     05-30              Creatinine Trend: 0.86<--, 0.88<--, 0.79<--, 0.80<--, 0.67<--, 0.68<--  I&O's Summary    30 May 2023 07:01  -  31 May 2023 07:00  --------------------------------------------------------  IN: 250 mL / OUT: 0 mL / NET: 250 mL      BNP    RADIOLOGY & ADDITIONAL STUDIES:             Anisha MendozaAlfaAnanda | PGY-2  Internal Medicine    OVERNIGHT EVENTS: no overnight events      SUBJECTIVE: Patient was examined at bedside this morning. Appeared comfortable. Overnight vitals and monitoring results were reviewed.      MEDICATIONS  (STANDING):  ARIPiprazole 15 milliGRAM(s) Oral daily  atorvastatin 40 milliGRAM(s) Oral at bedtime  budesonide 160 MICROgram(s)/formoterol 4.5 MICROgram(s) Inhaler 2 Puff(s) Inhalation two times a day  cholecalciferol 2000 Unit(s) Oral daily  Dakins Solution - 1/4 Strength 1 Application(s) Topical two times a day  diltiazem    milliGRAM(s) Oral daily  enoxaparin Injectable 40 milliGRAM(s) SubCutaneous every 24 hours  lidocaine   4% Patch 1 Patch Transdermal every 24 hours  losartan 100 milliGRAM(s) Oral daily  sodium chloride 1 Gram(s) Oral three times a day  vancomycin  IVPB 1250 milliGRAM(s) IV Intermittent every 8 hours    MEDICATIONS  (PRN):  acetaminophen     Tablet .. 975 milliGRAM(s) Oral every 6 hours PRN Temp greater or equal to 38C (100.4F), Mild Pain (1 - 3), Moderate Pain (4 - 6), Severe Pain (7 - 10)  albuterol    90 MICROgram(s) HFA Inhaler 2 Puff(s) Inhalation every 6 hours PRN Shortness of Breath and/or Wheezing  OLANZapine 5 milliGRAM(s) Oral every 6 hours PRN Agitation        T(F): 98.5 (05-31-23 @ 06:30), Max: 98.5 (05-31-23 @ 06:30)  HR: 71 (05-31-23 @ 06:30) (64 - 71)  BP: 140/83 (05-31-23 @ 06:30) (123/65 - 140/83)  BP(mean): --  RR: 18 (05-31-23 @ 06:30) (18 - 18)  SpO2: 100% (05-31-23 @ 06:30) (95% - 100%)    PHYSICAL EXAM:     GENERAL: NAD, lying in bed comfortably  HEAD:  Atraumatic, Normocephalic  EYES: EOMI, PERRLA, conjunctiva and sclera clear, no nystagmus noted  CHEST/LUNG: Clear to auscultation bilaterally; No rales, rhonchi, wheezing, or rubs. Unlabored respirations  HEART: Regular rate and rhythm; No murmurs, rubs, or gallops, normal S1/S2  ABDOMEN: normal bowel sounds; Soft, nontender, nondistended, no organomegaly   EXTREMITIES:  2+ Peripheral Pulses, brisk capillary refill. No clubbing, cyanosis, or edema  Neurological:  A&Ox3, no focal deficits     TELEMETRY:    LABS:                        11.1   3.79  )-----------( 224      ( 30 May 2023 13:30 )             33.2     05-31    132<L>  |  95<L>  |  10  ----------------------------<  85  4.2   |  26  |  0.86    Ca    9.3      31 May 2023 04:04  Phos  4.4     05-30  Mg     1.80     05-30              Creatinine Trend: 0.86<--, 0.88<--, 0.79<--, 0.80<--, 0.67<--, 0.68<--  I&O's Summary    30 May 2023 07:01  -  31 May 2023 07:00  --------------------------------------------------------  IN: 250 mL / OUT: 0 mL / NET: 250 mL      BNP    RADIOLOGY & ADDITIONAL STUDIES:

## 2023-05-31 NOTE — PROGRESS NOTE ADULT - PROBLEM SELECTOR PLAN 5
Pt with h/o variable hyponatremia. SOsm calc c/w hypotonic hyponatremia. Farrukh low suggestive of sodium-avid state (i.e. RAAS activation), UOsm quite low, though c/f psychogenic polydipsia vs poor solute intake.. Unclear contribution of ARB as OP as pt not filling medications consistently.   likely 2/2 SIADH as improved with fluid restriction   - Trend Na   - c/w fluid restriction to 1500  - c/w salt tabs PT with h/o CVID requiring IgG infusions q1mo (last 4/13/2023).  OP Immunologist: Dr. Mitchell Boxer ([facility])  IGg level low at 489   s/p gammagard 75 grams on 5/8. D/w A&I fellow Dr. Kimura on 5/10, no need to re-dose with gammagard S/D at this time. Can resume usual monthly infusions after discharge.    - Allergy and Immunology recs appreciated   -recorded allergy to amoxicillin and penicillin however tolerated augmentin in the past, may re-address allergy if he needs a beta lactem

## 2023-05-31 NOTE — PROGRESS NOTE ADULT - PROBLEM SELECTOR PLAN 3
-C/W Home Losartan 100mg PO QD  -c/w  Home Diltiazem ER 300mg PO QD Pt with h/o variable hyponatremia. SOsm calc c/w hypotonic hyponatremia. Farrukh low suggestive of sodium-avid state (i.e. RAAS activation), UOsm quite low, though c/f psychogenic polydipsia vs poor solute intake.. Unclear contribution of ARB as OP as pt not filling medications consistently.   likely 2/2 SIADH as improved with fluid restriction   - Trend Na   - fluid restriction to 1200  - c/w salt tabs  - repeat urine studies

## 2023-06-01 LAB
ANION GAP SERPL CALC-SCNC: 15 MMOL/L — HIGH (ref 7–14)
BUN SERPL-MCNC: 7 MG/DL — SIGNIFICANT CHANGE UP (ref 7–23)
CALCIUM SERPL-MCNC: 9.1 MG/DL — SIGNIFICANT CHANGE UP (ref 8.4–10.5)
CHLORIDE SERPL-SCNC: 97 MMOL/L — LOW (ref 98–107)
CO2 SERPL-SCNC: 24 MMOL/L — SIGNIFICANT CHANGE UP (ref 22–31)
CREAT SERPL-MCNC: 0.8 MG/DL — SIGNIFICANT CHANGE UP (ref 0.5–1.3)
EGFR: 105 ML/MIN/1.73M2 — SIGNIFICANT CHANGE UP
GLUCOSE SERPL-MCNC: 72 MG/DL — SIGNIFICANT CHANGE UP (ref 70–99)
MAGNESIUM SERPL-MCNC: 2 MG/DL — SIGNIFICANT CHANGE UP (ref 1.6–2.6)
PHOSPHATE SERPL-MCNC: 4.2 MG/DL — SIGNIFICANT CHANGE UP (ref 2.5–4.5)
POTASSIUM SERPL-MCNC: 4.8 MMOL/L — SIGNIFICANT CHANGE UP (ref 3.5–5.3)
POTASSIUM SERPL-SCNC: 4.8 MMOL/L — SIGNIFICANT CHANGE UP (ref 3.5–5.3)
SODIUM SERPL-SCNC: 136 MMOL/L — SIGNIFICANT CHANGE UP (ref 135–145)

## 2023-06-01 PROCEDURE — 99232 SBSQ HOSP IP/OBS MODERATE 35: CPT

## 2023-06-01 PROCEDURE — 99233 SBSQ HOSP IP/OBS HIGH 50: CPT | Mod: GC

## 2023-06-01 RX ORDER — VANCOMYCIN HCL 1 G
1500 VIAL (EA) INTRAVENOUS EVERY 12 HOURS
Refills: 0 | Status: COMPLETED | OUTPATIENT
Start: 2023-06-01 | End: 2023-06-05

## 2023-06-01 RX ADMIN — ENOXAPARIN SODIUM 40 MILLIGRAM(S): 100 INJECTION SUBCUTANEOUS at 12:02

## 2023-06-01 RX ADMIN — Medication 300 MILLIGRAM(S): at 19:07

## 2023-06-01 RX ADMIN — Medication 166.67 MILLIGRAM(S): at 06:19

## 2023-06-01 RX ADMIN — Medication 2000 UNIT(S): at 12:03

## 2023-06-01 RX ADMIN — ATORVASTATIN CALCIUM 40 MILLIGRAM(S): 80 TABLET, FILM COATED ORAL at 22:04

## 2023-06-01 RX ADMIN — SODIUM CHLORIDE 1 GRAM(S): 9 INJECTION INTRAMUSCULAR; INTRAVENOUS; SUBCUTANEOUS at 22:04

## 2023-06-01 RX ADMIN — ARIPIPRAZOLE 15 MILLIGRAM(S): 15 TABLET ORAL at 12:02

## 2023-06-01 RX ADMIN — BUDESONIDE AND FORMOTEROL FUMARATE DIHYDRATE 2 PUFF(S): 160; 4.5 AEROSOL RESPIRATORY (INHALATION) at 22:05

## 2023-06-01 RX ADMIN — Medication 300 MILLIGRAM(S): at 06:19

## 2023-06-01 RX ADMIN — Medication 1 APPLICATION(S): at 17:25

## 2023-06-01 RX ADMIN — SODIUM CHLORIDE 1 GRAM(S): 9 INJECTION INTRAMUSCULAR; INTRAVENOUS; SUBCUTANEOUS at 13:07

## 2023-06-01 RX ADMIN — SODIUM CHLORIDE 1 GRAM(S): 9 INJECTION INTRAMUSCULAR; INTRAVENOUS; SUBCUTANEOUS at 06:19

## 2023-06-01 RX ADMIN — LOSARTAN POTASSIUM 100 MILLIGRAM(S): 100 TABLET, FILM COATED ORAL at 06:19

## 2023-06-01 RX ADMIN — Medication 1 APPLICATION(S): at 06:19

## 2023-06-01 NOTE — PROGRESS NOTE ADULT - ATTENDING COMMENTS
56 yo male with PMHx Schizoaffective D/O, CVID/hypogammaglobulinemia on monthly IVIG, HTN, DM2, p/w sepsis from Lt gluteal MRSA abscess/cellulitis – failed previous treatments c/b MRSA bacteremia s/p Debridement 5/10, Hyponatremia - SIADH.    Sepsis from L gluteal MRSA abscess/cellulitis  - will need to continue Vanco IV until 6/5  - patient requires inpatient psychiatric facility for management - but unable to care for patient with PICC line d/t safety  - continue to monitor clinical status    SIADH:  - noted low Na 5/30, resolved on 6/1  - Good response to daily fluid restriction to 1.2L/day    Schizoaffective D/O:  - Anticipate return to [facility] once IV abx is finished.

## 2023-06-01 NOTE — PROGRESS NOTE ADULT - SUBJECTIVE AND OBJECTIVE BOX
Anisha KellyAlfaAnanda | PGY-2  Internal Medicine    OVERNIGHT EVENTS: no overnight events      SUBJECTIVE: Patient was examined at bedside this morning. Appeared comfortable. Overnight vitals and monitoring results were reviewed.      MEDICATIONS  (STANDING):  ARIPiprazole 15 milliGRAM(s) Oral daily  atorvastatin 40 milliGRAM(s) Oral at bedtime  budesonide 160 MICROgram(s)/formoterol 4.5 MICROgram(s) Inhaler 2 Puff(s) Inhalation two times a day  cholecalciferol 2000 Unit(s) Oral daily  Dakins Solution - 1/4 Strength 1 Application(s) Topical two times a day  diltiazem    milliGRAM(s) Oral daily  enoxaparin Injectable 40 milliGRAM(s) SubCutaneous every 24 hours  lidocaine   4% Patch 1 Patch Transdermal every 24 hours  losartan 100 milliGRAM(s) Oral daily  sodium chloride 1 Gram(s) Oral three times a day  vancomycin  IVPB 1500 milliGRAM(s) IV Intermittent every 12 hours    MEDICATIONS  (PRN):  acetaminophen     Tablet .. 975 milliGRAM(s) Oral every 6 hours PRN Temp greater or equal to 38C (100.4F), Mild Pain (1 - 3), Moderate Pain (4 - 6), Severe Pain (7 - 10)  albuterol    90 MICROgram(s) HFA Inhaler 2 Puff(s) Inhalation every 6 hours PRN Shortness of Breath and/or Wheezing  OLANZapine 5 milliGRAM(s) Oral every 6 hours PRN Agitation        T(F): 97.5 (06-01-23 @ 04:43), Max: 97.6 (05-31-23 @ 21:51)  HR: 63 (06-01-23 @ 04:43) (62 - 66)  BP: 127/67 (06-01-23 @ 04:43) (127/67 - 143/75)  BP(mean): --  RR: 18 (06-01-23 @ 04:43) (16 - 18)  SpO2: 97% (06-01-23 @ 04:43) (97% - 98%)    PHYSICAL EXAM:     GENERAL: NAD, lying in bed comfortably  HEAD:  Atraumatic, Normocephalic  EYES: EOMI, PERRLA, conjunctiva and sclera clear, no nystagmus noted  CHEST/LUNG: Clear to auscultation bilaterally; No rales, rhonchi, wheezing, or rubs. Unlabored respirations  HEART: Regular rate and rhythm; No murmurs, rubs, or gallops, normal S1/S2  ABDOMEN: normal bowel sounds; Soft, nontender, nondistended, no organomegaly   EXTREMITIES:  2+ Peripheral Pulses, brisk capillary refill. No clubbing, cyanosis, or edema   Neurological:  A&Ox3, no focal deficits     TELEMETRY:    LABS:                        11.1   3.79  )-----------( 224      ( 30 May 2023 13:30 )             33.2     06-01    136  |  97<L>  |  7   ----------------------------<  72  4.8   |  24  |  0.80    Ca    9.1      01 Jun 2023 04:41  Phos  4.2     06-01  Mg     2.00     06-01              Creatinine Trend: 0.80<--, 0.86<--, 0.88<--, 0.79<--, 0.80<--, 0.67<--  I&O's Summary    31 May 2023 07:01  -  01 Jun 2023 07:00  --------------------------------------------------------  IN: 250 mL / OUT: 0 mL / NET: 250 mL      BNP    RADIOLOGY & ADDITIONAL STUDIES:

## 2023-06-01 NOTE — PROGRESS NOTE ADULT - PROBLEM SELECTOR PLAN 10
Diet: CC  DVT: lovenox  Dispo: Unable to give IV antibiotics at [facility], may need to stay inpatient until 6/5 for IV abx unless recommendation for inpatient psychiatry changes

## 2023-06-01 NOTE — PROGRESS NOTE ADULT - ASSESSMENT
57 M with bipolar, schizophrenia, HTN,  bronchiectasis, CVID, hypogammaglobulinemia (monthly IVIG), previous LLE cellulitis/abscess and MRSA bacteremia, went to [facility] for R buttock wound, cellulitis but signed out AMA and was given clinda now p/w worsening pain/edema  here afebrile, WBC: 15  abd/pelvis CT: buttock cellulitis, no abscess seen  blood cx: MRSA, repeat negative 5/8  s/p wound debridement by wound care 5/10, had Necrotic dermis and subcutaneous fatty tissue Into subcutaneous tissues, cx also with MRSA  TTE unable to exclude endocarditis    leukocytosis, MRSA bacteremia due to buttock cellulitis and abscess in the setting of CVID, on monthly IVIG and previous MRSA bacteremia with abscess  repeat blood cx negative 5/8, TTE unable to exclude endocarditis   s/p I&D of necrotic skin and fatty tissue 5/10, cx with MRSA as well    Recommend:  * Continue vanco, would decrease to 1500 mg IV q 12 hours based on AUC calculations  * monitor renal function to prevent nephrotoxicity  * f/u with wound care  * will need a 4 week course of vanco post negative blood cx through 6/5  * weekly CBC, CMP, vanco trough while on antibiotics     Jose Faye MD  Available through MS Teams  If no response, or after 5pm/weekends, call [phone]    Will sign off. Please call with questions.

## 2023-06-01 NOTE — PROGRESS NOTE ADULT - PROBLEM SELECTOR PLAN 3
Pt with h/o variable hyponatremia. SOsm calc c/w hypotonic hyponatremia. Farrukh low suggestive of sodium-avid state (i.e. RAAS activation), UOsm quite low, though c/f psychogenic polydipsia vs poor solute intake.. Unclear contribution of ARB as OP as pt not filling medications consistently.   likely 2/2 SIADH as improved with fluid restriction   - Trend Na   - fluid restriction to 1200  - c/w salt tabs  - repeat urine studies

## 2023-06-01 NOTE — PROGRESS NOTE ADULT - TIME BILLING
reviewing laboratory data, consultants' recommendations, documentation in [place], performing medically appropriate examinations/evaluations, discussion with patient/family/RN/ACP/Residents and interdisciplinary staff (such as , social workers, etc), counseling patient/family/care giver, ordering medically appropriate medication, tests, or procedures. Interventions were performed as documented above.
Preparing to see the patient including review of tests and other providers' notes, confirming history with family member, performing medical examination and evaluation, counseling and educating the patient/family/caregiver, ordering medications, tests and procedures, communicating with other health care professionals, documenting clinical information in the EMR, independently interpreting results and communicating results to the patient/family/caregiven, care coordination.
reviewing laboratory data, consultants' recommendations, documentation in [place], performing medically appropriate examinations/evaluations, discussion with patient/family/RN/ACP/Residents and interdisciplinary staff (such as , social workers, etc), counseling patient/family/care giver, ordering medically appropriate medication, tests, or procedures. Interventions were performed as documented above.
Preparing to see the patient including review of tests and other providers' notes, confirming history with family member, performing medical examination and evaluation, counseling and educating the patient/family/caregiver, ordering medications, tests and procedures, communicating with other health care professionals, documenting clinical information in the EMR, independently interpreting results and communicating results to the patient/family/caregiven, care coordination.

## 2023-06-01 NOTE — PHARMACOTHERAPY INTERVENTION NOTE - NSPHARMCOMMASP
ASP - Dose optimization/Non-Renal dose adjustment

## 2023-06-01 NOTE — PROGRESS NOTE ADULT - SUBJECTIVE AND OBJECTIVE BOX
CC: Patient is a 57y old  Male who presents with a chief complaint of MRSA bacteremia iso R buttock cellulitis (20 May 2023 05:42)    ID following for MRSA bacteremia, R buttock wound infection    Interval History/ROS: Patient has no new complaints. No fevers, no chills.    Rest of ROS negative.    Allergies  amoxicillin (Fever)  penicillin (Rash)    ANTIMICROBIALS:  vancomycin  IVPB 1500 every 12 hours    OTHER MEDS:  acetaminophen     Tablet .. 975 milliGRAM(s) Oral every 6 hours PRN  albuterol    90 MICROgram(s) HFA Inhaler 2 Puff(s) Inhalation every 6 hours PRN  ARIPiprazole 15 milliGRAM(s) Oral daily  atorvastatin 40 milliGRAM(s) Oral at bedtime  budesonide 160 MICROgram(s)/formoterol 4.5 MICROgram(s) Inhaler 2 Puff(s) Inhalation two times a day  cholecalciferol 2000 Unit(s) Oral daily  Dakins Solution - 1/4 Strength 1 Application(s) Topical two times a day  diltiazem    milliGRAM(s) Oral daily  enoxaparin Injectable 40 milliGRAM(s) SubCutaneous every 24 hours  lidocaine   4% Patch 1 Patch Transdermal every 24 hours  losartan 100 milliGRAM(s) Oral daily  OLANZapine 5 milliGRAM(s) Oral every 6 hours PRN  sodium chloride 1 Gram(s) Oral three times a day    PE:    Vital Signs Last 24 Hrs  T(C): 36.4 (01 Jun 2023 04:43), Max: 36.4 (31 May 2023 21:51)  T(F): 97.5 (01 Jun 2023 04:43), Max: 97.6 (31 May 2023 21:51)  HR: 63 (01 Jun 2023 04:43) (62 - 66)  BP: 127/67 (01 Jun 2023 04:43) (127/67 - 143/75)  BP(mean): --  RR: 18 (01 Jun 2023 04:43) (16 - 18)  SpO2: 97% (01 Jun 2023 04:43) (97% - 98%)    Parameters below as of 01 Jun 2023 04:43  Patient On (Oxygen Delivery Method): room air    Gen: AOx3, NAD  CV: S1+S2 normal, no murmurs  Resp: Clear bilat, no resp distress  Abd: Soft, nontender, +BS  Ext: No LE edema, no wounds  : No Perez  IV/Skin: No thrombophlebitis, R buttock wound without purulent drainage, healing well  Neuro: no focal deficits    LABS:                          11.1   3.79  )-----------( 224      ( 30 May 2023 13:30 )             33.2       06-01    136  |  97<L>  |  7   ----------------------------<  72  4.8   |  24  |  0.80    Ca    9.1      01 Jun 2023 04:41  Phos  4.2     06-01  Mg     2.00     06-01            MICROBIOLOGY:  Vancomycin Level, Trough: 19.7 ug/mL (05-31-23 @ 22:04)  v  .Blood Blood-Peripheral  05-10-23   No Growth Final  --  --      .Blood Blood-Peripheral  05-10-23   No Growth Final  --  --      .Abscess right buttock  05-09-23   Moderate Methicillin Resistant Staphylococcus aureus  --  Methicillin resistant Staphylococcus aureus      .Blood Blood-Peripheral  05-08-23   No Growth Final  --  --      .Blood Blood-Peripheral  05-08-23   No Growth Final  --  --      Clean Catch Clean Catch (Midstream)  05-06-23   No growth  --  --      .Blood Blood-Peripheral  05-06-23   Growth in aerobic bottle: Methicillin Resistant Staphylococcus aureus  ***Blood Panel PCR results on this specimen are available  approximately 3 hours after the Gram stain result.***  Gram stain, PCR, and/or culture results may not always  correspond due to difference in methodologies.  ************************************************************  This PCR assay was performed by multiplex PCR. This  Assay tests for 66 bacterial and resistance gene targets.  Please refer to the [facility] [employer] test directory  at [email or web address] for details.  --  Blood Culture PCR  Methicillin resistant Staphylococcus aureus      .Blood Blood-Peripheral  05-06-23   No Growth Final  --  --    RADIOLOGY:    < from: CT Pelvis w/ IV Cont (05.06.23 @ 21:22) >  IMPRESSION:  Moderate subcutaneous inflammatory change and skin thickening in the   right buttocks suggestive of a cellulitis. No associated rim-enhancing   fluid collection to suggest an abscess. No subcutaneous gas.    < end of copied text >

## 2023-06-01 NOTE — PHARMACOTHERAPY INTERVENTION NOTE - COMMENTS
58 yo M with MRSA bacteremia on IV vancomycin, currently on IV vanco 1.5 g q8h.  Scr stable.  Trough 5/22 at 8:26 15.1 mcg/mL.        AUC/HATTIE calculations using PrecisePK (goal -600):     vanco 1.5 g IV q8h = AUC of 700     vanco 1.5 g IV q12h =      vanco 1.25 g IV q8h =     Recommendation(s):  1) Suggest decreasing IV vancomycin to 1500 mg IV q12h OR can go back to 1250 mg IV q8h to achieve a goal AUC of 400-600.      Rocio Clark, PharmD, BCIDP  Clinical Pharmacy Specialist, Infectious Diseases  Spectra extension [phone]  .
56 yo M with MRSA bacteremia.  Trough of 7.3 with IV vanco dose of 1250 mg IV q8h.  Primary team increased IV vanco to 1.5 g IV q8h.    Using PrecisePK, the following AUC/HATTIE were calculated:    Vanco 1.5 g IV q8 yields AUC = 930    Vanco 1.25 q q8h yields AUC = 775    Vanco 1.25 g IV q12 yields AUC = 516 (goal range 400-600)    Recommendation(s):  1) Suggest changing IV vancomycin to 1.25 g IV q12h as it will predicted to yield an AUC between 400-600.  First dose can be given STAT.    Rocio Clark, JackieD, BCIDP  Clinical Pharmacy Specialist, Infectious Diseases  Spectra extension [phone]  .
56 yo on IV vancomycin for MRSA bacteremia.  Scr stable and IV vanco trough was 10.7 on 5/17/2023 at 5:50 am.  Pt is currently receiving Vancomycin 1250 mg IV q8h.    Using PrecisePK calculator, Vanco 1.25 g IV q8h yields AUC = 494.  Goal AUC is 400-600.    Recommendation(s):  1)  Continue vancomycin at the current dose of 1.25 g IV q8h.    Rocio Clark, PharmD, BCIDP  Clinical Pharmacy Specialist, Infectious Diseases  Spectra extension [phone]  .   
57 year old male with MRSA bacteremia due to buttock cellulitis and abscess on vancomycin IV     Current dose  - Vancomycin IV 1250mg Q8 (5/26 -     Trough from 5/31/23 at 2204 = 19.7 mcg/mL   Based on PrecisePK calculations, current AUC/HATTIE = 587 hr*mg/L   Goal AUC/HATTIE for MRSA = 400 to 600 hr*mg/L     Recommendation(s):  1) Based on PrecisePK predictions, consider altering the dose of vancomycin to 1500mg Q12 to start at 2100 on 6/1/23. Predicted AUC with this regimen = 470 hr*mg/L (predicted trough = 12.6 mcg/mL)   2) Suggest vancomycin trough prior to the 4th dose     Jackie StewardD  PGY-2 Infectious Diseases Pharmacy Resident  SpectraLink: [phone]

## 2023-06-02 PROCEDURE — 99232 SBSQ HOSP IP/OBS MODERATE 35: CPT | Mod: GC

## 2023-06-02 PROCEDURE — 99232 SBSQ HOSP IP/OBS MODERATE 35: CPT

## 2023-06-02 RX ADMIN — Medication 300 MILLIGRAM(S): at 19:03

## 2023-06-02 RX ADMIN — ARIPIPRAZOLE 15 MILLIGRAM(S): 15 TABLET ORAL at 11:33

## 2023-06-02 RX ADMIN — SODIUM CHLORIDE 1 GRAM(S): 9 INJECTION INTRAMUSCULAR; INTRAVENOUS; SUBCUTANEOUS at 13:10

## 2023-06-02 RX ADMIN — Medication 1 APPLICATION(S): at 06:39

## 2023-06-02 RX ADMIN — BUDESONIDE AND FORMOTEROL FUMARATE DIHYDRATE 2 PUFF(S): 160; 4.5 AEROSOL RESPIRATORY (INHALATION) at 22:50

## 2023-06-02 RX ADMIN — SODIUM CHLORIDE 1 GRAM(S): 9 INJECTION INTRAMUSCULAR; INTRAVENOUS; SUBCUTANEOUS at 22:50

## 2023-06-02 RX ADMIN — ENOXAPARIN SODIUM 40 MILLIGRAM(S): 100 INJECTION SUBCUTANEOUS at 11:34

## 2023-06-02 RX ADMIN — LOSARTAN POTASSIUM 100 MILLIGRAM(S): 100 TABLET, FILM COATED ORAL at 06:39

## 2023-06-02 RX ADMIN — Medication 300 MILLIGRAM(S): at 06:39

## 2023-06-02 RX ADMIN — Medication 2000 UNIT(S): at 11:33

## 2023-06-02 RX ADMIN — SODIUM CHLORIDE 1 GRAM(S): 9 INJECTION INTRAMUSCULAR; INTRAVENOUS; SUBCUTANEOUS at 06:38

## 2023-06-02 RX ADMIN — ATORVASTATIN CALCIUM 40 MILLIGRAM(S): 80 TABLET, FILM COATED ORAL at 22:51

## 2023-06-02 RX ADMIN — Medication 1 APPLICATION(S): at 17:05

## 2023-06-02 RX ADMIN — Medication 300 MILLIGRAM(S): at 07:55

## 2023-06-02 NOTE — PROGRESS NOTE ADULT - PROBLEM SELECTOR PLAN 10
Diet: CC  DVT: lovenox  Dispo: Unable to give IV antibiotics at [facility], may need to stay inpatient until 6/5 for IV abx unless recommendation for inpatient psychiatry changes

## 2023-06-02 NOTE — PROGRESS NOTE ADULT - ATTENDING COMMENTS
56 yo male with PMHx Schizoaffective D/O, CVID/hypogammaglobulinemia on monthly IVIG, HTN, DM2, p/w sepsis from Lt gluteal MRSA abscess/cellulitis – failed previous treatments c/b MRSA bacteremia s/p Debridement 5/10, Hyponatremia - SIADH.    Sepsis from L gluteal MRSA abscess/cellulitis  - will need to continue Vanco IV until 6/5  - patient requires inpatient psychiatric facility for management - but unable to care for patient with PICC line d/t safety  - continue to monitor clinical status    SIADH:  - noted low Na 5/30, resolved on 6/1  - Good response to daily fluid restriction to 1.2L/day    Schizoaffective D/O:  - Anticipate return to [facility] once IV abx is finished.

## 2023-06-02 NOTE — PROGRESS NOTE ADULT - SUBJECTIVE AND OBJECTIVE BOX
PROGRESS NOTE:     Patient is a 57y old  Male who presents with a chief complaint of MRSA bacteremia iso R buttock cellulitis (20 May 2023 05:42)      SUBJECTIVE / OVERNIGHT EVENTS:    ADDITIONAL REVIEW OF SYSTEMS: 10 point ROS negative except per HPI    MEDICATIONS  (STANDING):  ARIPiprazole 15 milliGRAM(s) Oral daily  atorvastatin 40 milliGRAM(s) Oral at bedtime  budesonide 160 MICROgram(s)/formoterol 4.5 MICROgram(s) Inhaler 2 Puff(s) Inhalation two times a day  cholecalciferol 2000 Unit(s) Oral daily  Dakins Solution - 1/4 Strength 1 Application(s) Topical two times a day  diltiazem    milliGRAM(s) Oral daily  enoxaparin Injectable 40 milliGRAM(s) SubCutaneous every 24 hours  lidocaine   4% Patch 1 Patch Transdermal every 24 hours  losartan 100 milliGRAM(s) Oral daily  sodium chloride 1 Gram(s) Oral three times a day  vancomycin  IVPB 1500 milliGRAM(s) IV Intermittent every 12 hours    MEDICATIONS  (PRN):  acetaminophen     Tablet .. 975 milliGRAM(s) Oral every 6 hours PRN Temp greater or equal to 38C (100.4F), Mild Pain (1 - 3), Moderate Pain (4 - 6), Severe Pain (7 - 10)  albuterol    90 MICROgram(s) HFA Inhaler 2 Puff(s) Inhalation every 6 hours PRN Shortness of Breath and/or Wheezing  OLANZapine 5 milliGRAM(s) Oral every 6 hours PRN Agitation      CAPILLARY BLOOD GLUCOSE        I&O's Summary    01 Jun 2023 07:01  -  02 Jun 2023 07:00  --------------------------------------------------------  IN: 880 mL / OUT: 0 mL / NET: 880 mL        PHYSICAL EXAM:  Vital Signs Last 24 Hrs  T(C): 36.7 (02 Jun 2023 05:23), Max: 36.7 (01 Jun 2023 13:00)  T(F): 98.1 (02 Jun 2023 05:23), Max: 98.1 (01 Jun 2023 13:00)  HR: 70 (02 Jun 2023 05:23) (57 - 74)  BP: 137/77 (02 Jun 2023 05:23) (134/77 - 157/64)  BP(mean): --  RR: 17 (02 Jun 2023 05:23) (17 - 18)  SpO2: 96% (02 Jun 2023 05:23) (95% - 96%)    Parameters below as of 02 Jun 2023 05:23  Patient On (Oxygen Delivery Method): room air        CONSTITUTIONAL: NAD, well-developed, A&Ox3 to person, place, time.  RESPIRATORY: Normal respiratory effort; lungs are clear to auscultation bilaterally  CARDIOVASCULAR: Regular rate and rhythm, normal S1 and S2, no murmur/rub/gallop; No lower extremity edema; Peripheral pulses are 2+ bilaterally  ABDOMEN: Nontender to palpation, no rebound/guarding; No hepatosplenomegaly  MUSCLOSKELETAL: no clubbing or cyanosis of digits; no joint swelling or tenderness to palpation  NEURO: CN 2-12 grossly intact, moves all limbs spontaneously      LABS:      06-01    136  |  97<L>  |  7   ----------------------------<  72  4.8   |  24  |  0.80    Ca    9.1      01 Jun 2023 04:41  Phos  4.2     06-01  Mg     2.00     06-01            -----    MICRO      -----    TRENDS  Hemoglobin: 11.1 g/dL (05-30 @ 13:30)    Creatinine Trend: 0.80<--, 0.86<--, 0.88<--, 0.79<--, 0.80<--, 0.67<--  ----  RADIOLOGY & ADDITIONAL TESTS:  Results Reviewed:   Imaging Personally Reviewed:  Electrocardiogram Personally Reviewed:    COORDINATION OF CARE:  Care Discussed with Consultants/Other Providers [Y/N]:  Prior or Outpatient Records Reviewed [Y/N]:   PROGRESS NOTE:     Patient is a 57y old  Male who presents with a chief complaint of MRSA bacteremia iso R buttock cellulitis (20 May 2023 05:42)      SUBJECTIVE / OVERNIGHT EVENTS:   Overnight vitals stable. Patient afebrile. Examined at bedside. No complaints. No fever, chills, chest pain, nausea, vomiting, abdominal pain. Eating well. Having BM. Encouraged patient to use incentive spirometry.     ADDITIONAL REVIEW OF SYSTEMS: 10 point ROS negative except per HPI    MEDICATIONS  (STANDING):  ARIPiprazole 15 milliGRAM(s) Oral daily  atorvastatin 40 milliGRAM(s) Oral at bedtime  budesonide 160 MICROgram(s)/formoterol 4.5 MICROgram(s) Inhaler 2 Puff(s) Inhalation two times a day  cholecalciferol 2000 Unit(s) Oral daily  Dakins Solution - 1/4 Strength 1 Application(s) Topical two times a day  diltiazem    milliGRAM(s) Oral daily  enoxaparin Injectable 40 milliGRAM(s) SubCutaneous every 24 hours  lidocaine   4% Patch 1 Patch Transdermal every 24 hours  losartan 100 milliGRAM(s) Oral daily  sodium chloride 1 Gram(s) Oral three times a day  vancomycin  IVPB 1500 milliGRAM(s) IV Intermittent every 12 hours    MEDICATIONS  (PRN):  acetaminophen     Tablet .. 975 milliGRAM(s) Oral every 6 hours PRN Temp greater or equal to 38C (100.4F), Mild Pain (1 - 3), Moderate Pain (4 - 6), Severe Pain (7 - 10)  albuterol    90 MICROgram(s) HFA Inhaler 2 Puff(s) Inhalation every 6 hours PRN Shortness of Breath and/or Wheezing  OLANZapine 5 milliGRAM(s) Oral every 6 hours PRN Agitation      CAPILLARY BLOOD GLUCOSE        I&O's Summary    01 Jun 2023 07:01  -  02 Jun 2023 07:00  --------------------------------------------------------  IN: 880 mL / OUT: 0 mL / NET: 880 mL        PHYSICAL EXAM:  Vital Signs Last 24 Hrs  T(C): 36.7 (02 Jun 2023 05:23), Max: 36.7 (01 Jun 2023 13:00)  T(F): 98.1 (02 Jun 2023 05:23), Max: 98.1 (01 Jun 2023 13:00)  HR: 70 (02 Jun 2023 05:23) (57 - 74)  BP: 137/77 (02 Jun 2023 05:23) (134/77 - 157/64)  BP(mean): --  RR: 17 (02 Jun 2023 05:23) (17 - 18)  SpO2: 96% (02 Jun 2023 05:23) (95% - 96%)    Parameters below as of 02 Jun 2023 05:23  Patient On (Oxygen Delivery Method): room air      GENERAL: NAD, lying in bed comfortably  HEAD:  Atraumatic, Normocephalic  EYES: EOMI, PERRLA, conjunctiva and sclera clear, no nystagmus noted  CHEST/LUNG: Clear to auscultation bilaterally; No rales, rhonchi, wheezing, or rubs.  HEART: Regular rate and rhythm; No murmurs, rubs, or gallops, normal S1/S2  ABDOMEN: normal bowel sounds; Soft, nontender, nondistended, no organomegaly   EXTREMITIES:  2+ Peripheral Pulses, brisk capillary refill. No clubbing, cyanosis, or edema   Neurological:  A&Ox3, no focal deficits       LABS:      06-01    136  |  97<L>  |  7   ----------------------------<  72  4.8   |  24  |  0.80    Ca    9.1      01 Jun 2023 04:41  Phos  4.2     06-01  Mg     2.00     06-01            -----    MICRO      -----    TRENDS  Hemoglobin: 11.1 g/dL (05-30 @ 13:30)    Creatinine Trend: 0.80<--, 0.86<--, 0.88<--, 0.79<--, 0.80<--, 0.67<--  ----  RADIOLOGY & ADDITIONAL TESTS:  Results Reviewed:   Imaging Personally Reviewed:  Electrocardiogram Personally Reviewed:    COORDINATION OF CARE:  Care Discussed with Consultants/Other Providers [Y/N]:  Prior or Outpatient Records Reviewed [Y/N]:

## 2023-06-02 NOTE — PROGRESS NOTE ADULT - ASSESSMENT
Mr. 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  Mr. 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

## 2023-06-02 NOTE — BH CONSULTATION LIAISON PROGRESS NOTE - NSBHASSESSMENTFT_PSY_ALL_CORE
Assessment	56M, domiciled in supportive housing TSI, SSD, single, PMH HTN, DM, hypogammaglobulinemia, PPHx schizoaffective d/o, bipolar d/o, hx of multiple psych hospitalizations (last known in 2020 at [facility]), denies hx SA/NSSIB, denies drug or alcohol use, denies legal hx, who presented with right gluteal wound, admitted to medicine for cellulutis. Psychiatry consulted for psychosis/schizophrenia.    On assessment, patient is calm and cooperative. AAO3. No overt symptoms of psychosis, depression, or yo. No S/I or H/I. Patient w/ recurrent/chronic infections. Patient has left multiple hospitals AMA while undergoing treatment for cellulitis. However, patient recognizes benefit of continuing w/ antibiotic treatment, as well as risks of discontinuing treatment. Patient not currently refusing treatment or attempting to leave AMA. Patient interested in reconnection to outpatient care after discharge. Patient was due for Haldol decanoate 150mg on 4/16. Will obtain collateral see when last received.     5/9: no interval change. no overt positive symptoms of psychosis. patient w/ likely negative/cognitive symptoms that impair ability to adequately care for self (e.g. likely contribute to poor hygiene, recurrent infections, etc.). no si or hi. interested in resuming outpatient care and restarting psychotropics. recommend aripiprazole 5mg qd for psychosis/mood (w/ reported history of depression).     5/10: status quo. no psychosis or depression. patient w/ impaired ability to care for self, likely a result of negative/cognitive symptoms. per chart review, patient has not seen outpt psychiatrist since 9/22. cannot leave AMA. will consider inpatient psych once medically cleared.     5/11: feels physically better. mood improved. no overt positive psychotic symptoms. can increase aripiprazole to 10mg qd.     5/12: minimal interval change. pleasant, calm, cooperative, likely cognitively limited due to chronic SMI. c/w aripiprazole 10mg    5/13: status quo. no mood or psychotic sx. in good behavioral control. can increase aripiprazole to 15mg.      5/22: pleasant, cooperative, adherent w/ medical and psychiatric treatment. no overt psychosis. plan for abilify injection tomorrow.     5/24: remains pleasant, cooperative. no psychosis. s/p abilify BELLA.    6/2: remains pleasant, cooperative, no psychosis.     Plan:  - Routine observation, no psychiatric indication for CO 1:1  - c/w aripiprazole 15mg qd - REQUIRES 2 WEEKS OF ARIPIPRAZOLE 15MG QD ORAL OVERLAP  - abilify maintenna 300mg injection 5/23 - [id number]  - Zyprexa 2.5-5mg PO/IM/IV q6hrs PRN for agitation  - CANNOT LEAVE AMA  - Dispo: patient requires PICC until 6/5. Psychiatrically continuing to do better. After this DOES NOT require inpt psych. Can return home and follow-up with Dr. Plascencia (outpt psych at [facility] with whom we are in touch; appt date to be made and communicated early next week).

## 2023-06-03 LAB — VANCOMYCIN TROUGH SERPL-MCNC: 15.1 UG/ML — SIGNIFICANT CHANGE UP (ref 10–20)

## 2023-06-03 PROCEDURE — 99232 SBSQ HOSP IP/OBS MODERATE 35: CPT | Mod: GC

## 2023-06-03 RX ADMIN — Medication 300 MILLIGRAM(S): at 07:03

## 2023-06-03 RX ADMIN — SODIUM CHLORIDE 1 GRAM(S): 9 INJECTION INTRAMUSCULAR; INTRAVENOUS; SUBCUTANEOUS at 13:57

## 2023-06-03 RX ADMIN — ARIPIPRAZOLE 15 MILLIGRAM(S): 15 TABLET ORAL at 12:10

## 2023-06-03 RX ADMIN — SODIUM CHLORIDE 1 GRAM(S): 9 INJECTION INTRAMUSCULAR; INTRAVENOUS; SUBCUTANEOUS at 21:33

## 2023-06-03 RX ADMIN — SODIUM CHLORIDE 1 GRAM(S): 9 INJECTION INTRAMUSCULAR; INTRAVENOUS; SUBCUTANEOUS at 07:03

## 2023-06-03 RX ADMIN — LOSARTAN POTASSIUM 100 MILLIGRAM(S): 100 TABLET, FILM COATED ORAL at 07:03

## 2023-06-03 RX ADMIN — BUDESONIDE AND FORMOTEROL FUMARATE DIHYDRATE 2 PUFF(S): 160; 4.5 AEROSOL RESPIRATORY (INHALATION) at 21:32

## 2023-06-03 RX ADMIN — Medication 1 APPLICATION(S): at 07:03

## 2023-06-03 RX ADMIN — Medication 2000 UNIT(S): at 12:10

## 2023-06-03 RX ADMIN — ATORVASTATIN CALCIUM 40 MILLIGRAM(S): 80 TABLET, FILM COATED ORAL at 21:33

## 2023-06-03 RX ADMIN — Medication 300 MILLIGRAM(S): at 21:33

## 2023-06-03 RX ADMIN — ENOXAPARIN SODIUM 40 MILLIGRAM(S): 100 INJECTION SUBCUTANEOUS at 12:10

## 2023-06-03 RX ADMIN — Medication 1 APPLICATION(S): at 17:28

## 2023-06-03 NOTE — PROGRESS NOTE ADULT - SUBJECTIVE AND OBJECTIVE BOX
Anisha KellyAlfaAnanda | PGY-2  Internal Medicine    OVERNIGHT EVENTS: no overnight events      SUBJECTIVE: Patient was examined at bedside this morning. Appeared comfortable. Overnight vitals and monitoring results were reviewed.       MEDICATIONS  (STANDING):  ARIPiprazole 15 milliGRAM(s) Oral daily  atorvastatin 40 milliGRAM(s) Oral at bedtime  budesonide 160 MICROgram(s)/formoterol 4.5 MICROgram(s) Inhaler 2 Puff(s) Inhalation two times a day  cholecalciferol 2000 Unit(s) Oral daily  Dakins Solution - 1/4 Strength 1 Application(s) Topical two times a day  diltiazem    milliGRAM(s) Oral daily  enoxaparin Injectable 40 milliGRAM(s) SubCutaneous every 24 hours  lidocaine   4% Patch 1 Patch Transdermal every 24 hours  losartan 100 milliGRAM(s) Oral daily  sodium chloride 1 Gram(s) Oral three times a day  vancomycin  IVPB 1500 milliGRAM(s) IV Intermittent every 12 hours    MEDICATIONS  (PRN):  acetaminophen     Tablet .. 975 milliGRAM(s) Oral every 6 hours PRN Temp greater or equal to 38C (100.4F), Mild Pain (1 - 3), Moderate Pain (4 - 6), Severe Pain (7 - 10)  albuterol    90 MICROgram(s) HFA Inhaler 2 Puff(s) Inhalation every 6 hours PRN Shortness of Breath and/or Wheezing  OLANZapine 5 milliGRAM(s) Oral every 6 hours PRN Agitation        T(F): 98.3 (06-03-23 @ 06:31), Max: 98.3 (06-03-23 @ 06:31)  HR: 81 (06-03-23 @ 06:31) (62 - 81)  BP: 151/86 (06-03-23 @ 06:31) (136/82 - 151/86)  BP(mean): --  RR: 18 (06-03-23 @ 06:31) (17 - 18)  SpO2: 95% (06-03-23 @ 06:31) (95% - 96%)    PHYSICAL EXAM:     GENERAL: NAD, lying in bed comfortably  HEAD:  Atraumatic, Normocephalic  EYES: EOMI, PERRLA, conjunctiva and sclera clear, no nystagmus noted  CHEST/LUNG: Clear to auscultation bilaterally; No rales, rhonchi, wheezing, or rubs. Unlabored respirations  HEART: Regular rate and rhythm; No murmurs, rubs, or gallops, normal S1/S2  ABDOMEN: normal bowel sounds; Soft, nontender, nondistended, no organomegaly   EXTREMITIES:  2+ Peripheral Pulses, brisk capillary refill. No clubbing, cyanosis, or edema  Neurological:  A&Ox3, no focal deficits       TELEMETRY:    LABS:                  Creatinine Trend: 0.80<--, 0.86<--, 0.88<--, 0.79<--, 0.80<--, 0.67<--  I&O's Summary    02 Jun 2023 07:01  -  03 Jun 2023 07:00  --------------------------------------------------------  IN: 740 mL / OUT: 0 mL / NET: 740 mL      BNP    RADIOLOGY & ADDITIONAL STUDIES:

## 2023-06-03 NOTE — PROGRESS NOTE ADULT - PROBLEM SELECTOR PLAN 10
Diet: CC  DVT: lovenox  Dispo: Unable to give IV antibiotics at [facility], may need to stay inpatient until 6/5 for IV abx unless recommendation for inpatient psychiatry changes

## 2023-06-03 NOTE — PROGRESS NOTE ADULT - PROBLEM SELECTOR PLAN 5
PT with h/o CVID requiring IgG infusions q1mo (last 4/13/2023).  OP Immunologist: Dr. Mitchell Boxer ([facility])  IGg level low at 489   s/p gammagard 75 grams on 5/8. D/w A&I fellow Dr. Kimura on 5/10, no need to re-dose with gammagard S/D at this time. Can resume usual monthly infusions after discharge.    - Allergy and Immunology recs appreciated   -recorded allergy to amoxicillin and penicillin however tolerated augmentin in the past, may re-address allergy if he needs a beta lactem

## 2023-06-03 NOTE — PROGRESS NOTE ADULT - PROBLEM SELECTOR PLAN 2
Historically diagnosed. Pt confirms schizophrenia but denies being on any active psychotropic medications. Denies AH/VH. Denies SI/HI. Does not specify why he left AMA from other hospital, but does understand that he needs help here. historically lacked capacity to leave AMA, but not trying at this time.  - No need for 1:1 at this time  - Psychiatry/ Consult recs appreciated  - c/w aripiprazole for psychosis/mood,  ordered BELLA abilify maintena 300mg Qmo initiated Tue 5/23 (f/u  further for logistics)   - C/W zyrprexa 5 PO for agitation  - Per , pt cannot leave AMA at this time.  now clearing pt to go home once abx course complete

## 2023-06-03 NOTE — PROGRESS NOTE ADULT - ATTENDING COMMENTS
56 yo male with PMHx Schizoaffective D/O, CVID/hypogammaglobulinemia on monthly IVIG, HTN, DM2, p/w sepsis from Lt gluteal MRSA abscess/cellulitis – failed previous treatments c/b MRSA bacteremia s/p Debridement 5/10, Hyponatremia - SIADH.    Sepsis from L gluteal MRSA abscess/cellulitis  - will need to continue Vanco IV until 6/5  - patient requires inpatient psychiatric facility for management - but unable to care for patient with PICC line d/t safety  - continue to monitor clinical status    SIADH:  - noted low Na 5/30, resolved on 6/1  - Good response to daily fluid restriction to 1.2L/day    Schizoaffective D/O:  - as per psych, clinically improved  - will not require inpatient psych admission    Dispo:  - D/w CM safe disposition once his IV Abx is finished on 6/5.

## 2023-06-04 LAB — SARS-COV-2 RNA SPEC QL NAA+PROBE: SIGNIFICANT CHANGE UP

## 2023-06-04 PROCEDURE — 99232 SBSQ HOSP IP/OBS MODERATE 35: CPT | Mod: GC

## 2023-06-04 RX ADMIN — Medication 2000 UNIT(S): at 13:34

## 2023-06-04 RX ADMIN — Medication 1 APPLICATION(S): at 05:50

## 2023-06-04 RX ADMIN — ENOXAPARIN SODIUM 40 MILLIGRAM(S): 100 INJECTION SUBCUTANEOUS at 13:34

## 2023-06-04 RX ADMIN — LOSARTAN POTASSIUM 100 MILLIGRAM(S): 100 TABLET, FILM COATED ORAL at 05:50

## 2023-06-04 RX ADMIN — SODIUM CHLORIDE 1 GRAM(S): 9 INJECTION INTRAMUSCULAR; INTRAVENOUS; SUBCUTANEOUS at 21:28

## 2023-06-04 RX ADMIN — Medication 300 MILLIGRAM(S): at 21:28

## 2023-06-04 RX ADMIN — ATORVASTATIN CALCIUM 40 MILLIGRAM(S): 80 TABLET, FILM COATED ORAL at 21:28

## 2023-06-04 RX ADMIN — SODIUM CHLORIDE 1 GRAM(S): 9 INJECTION INTRAMUSCULAR; INTRAVENOUS; SUBCUTANEOUS at 05:50

## 2023-06-04 RX ADMIN — BUDESONIDE AND FORMOTEROL FUMARATE DIHYDRATE 2 PUFF(S): 160; 4.5 AEROSOL RESPIRATORY (INHALATION) at 10:22

## 2023-06-04 RX ADMIN — BUDESONIDE AND FORMOTEROL FUMARATE DIHYDRATE 2 PUFF(S): 160; 4.5 AEROSOL RESPIRATORY (INHALATION) at 21:29

## 2023-06-04 RX ADMIN — Medication 300 MILLIGRAM(S): at 05:50

## 2023-06-04 RX ADMIN — Medication 1 APPLICATION(S): at 17:01

## 2023-06-04 RX ADMIN — Medication 300 MILLIGRAM(S): at 07:53

## 2023-06-04 RX ADMIN — OLANZAPINE 5 MILLIGRAM(S): 15 TABLET, FILM COATED ORAL at 13:34

## 2023-06-04 RX ADMIN — ARIPIPRAZOLE 15 MILLIGRAM(S): 15 TABLET ORAL at 13:35

## 2023-06-04 RX ADMIN — SODIUM CHLORIDE 1 GRAM(S): 9 INJECTION INTRAMUSCULAR; INTRAVENOUS; SUBCUTANEOUS at 13:34

## 2023-06-04 NOTE — PROGRESS NOTE ADULT - PROBLEM SELECTOR PLAN 5
PT with h/o CVID requiring IgG infusions q1mo (last 4/13/2023).  OP Immunologist: Dr. Mitchell Boxer ([facility])  IGg level low at 489   s/p gammagard 75 grams on 5/8. D/w A&I fellow Dr. Kimura on 5/10, no need to re-dose with gammagard S/D at this time. Can resume usual monthly infusions after discharge.    - Allergy and Immunology recs appreciated   -recorded allergy to amoxicillin and penicillin however tolerated augmentin in the past, may re-address allergy if he needs a beta lactem

## 2023-06-04 NOTE — PROGRESS NOTE ADULT - ATTENDING COMMENTS
56 yo male with PMHx Schizoaffective D/O, CVID/hypogammaglobulinemia on monthly IVIG, HTN, DM2, p/w sepsis from Lt gluteal MRSA abscess/cellulitis – failed previous treatments c/b MRSA bacteremia s/p Debridement 5/10, Hyponatremia - SIADH.    Sepsis from L gluteal MRSA abscess/cellulitis  - will need to continue Vanco IV until 6/5  - patient had required inpatient psychiatric facility for management - but unable to care for patient with PICC line d/t safety  - continue to monitor clinical status    SIADH:  - noted low Na 5/30, resolved on 6/1  - Good response to daily fluid restriction to 1.2L/day    Schizoaffective D/O:  - as per psych, clinically improved  - will not require inpatient psych admission    Dispo:  - D/w CM safe disposition once his IV Abx is finished on 6/5. Most likely home.

## 2023-06-04 NOTE — PROGRESS NOTE ADULT - SUBJECTIVE AND OBJECTIVE BOX
Anisha KellyAlfaAnanda | PGY-2  Internal Medicine    OVERNIGHT EVENTS: no overnight events      SUBJECTIVE: Patient was examined at bedside this morning. Appeared comfortable. Overnight vitals and monitoring results were reviewed.       MEDICATIONS  (STANDING):  ARIPiprazole 15 milliGRAM(s) Oral daily  atorvastatin 40 milliGRAM(s) Oral at bedtime  budesonide 160 MICROgram(s)/formoterol 4.5 MICROgram(s) Inhaler 2 Puff(s) Inhalation two times a day  cholecalciferol 2000 Unit(s) Oral daily  Dakins Solution - 1/4 Strength 1 Application(s) Topical two times a day  diltiazem    milliGRAM(s) Oral daily  enoxaparin Injectable 40 milliGRAM(s) SubCutaneous every 24 hours  lidocaine   4% Patch 1 Patch Transdermal every 24 hours  losartan 100 milliGRAM(s) Oral daily  sodium chloride 1 Gram(s) Oral three times a day  vancomycin  IVPB 1500 milliGRAM(s) IV Intermittent every 12 hours    MEDICATIONS  (PRN):  acetaminophen     Tablet .. 975 milliGRAM(s) Oral every 6 hours PRN Temp greater or equal to 38C (100.4F), Mild Pain (1 - 3), Moderate Pain (4 - 6), Severe Pain (7 - 10)  albuterol    90 MICROgram(s) HFA Inhaler 2 Puff(s) Inhalation every 6 hours PRN Shortness of Breath and/or Wheezing  OLANZapine 5 milliGRAM(s) Oral every 6 hours PRN Agitation        T(F): 97.2 (06-04-23 @ 05:35), Max: 98.6 (06-03-23 @ 12:05)  HR: 70 (06-04-23 @ 05:35) (64 - 76)  BP: 140/88 (06-04-23 @ 05:35) (137/81 - 149/77)  BP(mean): --  RR: 18 (06-04-23 @ 05:35) (18 - 18)  SpO2: 97% (06-04-23 @ 05:35) (96% - 100%)    PHYSICAL EXAM:     GENERAL: NAD, lying in bed comfortably  HEAD:  Atraumatic, Normocephalic  EYES: EOMI, PERRLA, conjunctiva and sclera clear, no nystagmus noted  CHEST/LUNG: Clear to auscultation bilaterally; No rales, rhonchi, wheezing, or rubs. Unlabored respirations  HEART: Regular rate and rhythm; No murmurs, rubs, or gallops, normal S1/S2  ABDOMEN: normal bowel sounds; Soft, nontender, nondistended, no organomegaly   EXTREMITIES:  2+ Peripheral Pulses, brisk capillary refill. No clubbing, cyanosis, or edema  Neurological:  A&Ox3, no focal deficits     TELEMETRY:    LABS:                  Creatinine Trend: 0.80<--, 0.86<--, 0.88<--, 0.79<--, 0.80<--, 0.67<--  I&O's Summary    03 Jun 2023 07:01  -  04 Jun 2023 07:00  --------------------------------------------------------  IN: 800 mL / OUT: 700 mL / NET: 100 mL      BNP    RADIOLOGY & ADDITIONAL STUDIES:

## 2023-06-05 LAB — VANCOMYCIN TROUGH SERPL-MCNC: 13.6 UG/ML — SIGNIFICANT CHANGE UP (ref 10–20)

## 2023-06-05 PROCEDURE — 99233 SBSQ HOSP IP/OBS HIGH 50: CPT

## 2023-06-05 PROCEDURE — 99232 SBSQ HOSP IP/OBS MODERATE 35: CPT | Mod: GC

## 2023-06-05 RX ADMIN — ENOXAPARIN SODIUM 40 MILLIGRAM(S): 100 INJECTION SUBCUTANEOUS at 13:32

## 2023-06-05 RX ADMIN — SODIUM CHLORIDE 1 GRAM(S): 9 INJECTION INTRAMUSCULAR; INTRAVENOUS; SUBCUTANEOUS at 22:00

## 2023-06-05 RX ADMIN — Medication 300 MILLIGRAM(S): at 09:12

## 2023-06-05 RX ADMIN — ATORVASTATIN CALCIUM 40 MILLIGRAM(S): 80 TABLET, FILM COATED ORAL at 22:00

## 2023-06-05 RX ADMIN — BUDESONIDE AND FORMOTEROL FUMARATE DIHYDRATE 2 PUFF(S): 160; 4.5 AEROSOL RESPIRATORY (INHALATION) at 09:12

## 2023-06-05 RX ADMIN — SODIUM CHLORIDE 1 GRAM(S): 9 INJECTION INTRAMUSCULAR; INTRAVENOUS; SUBCUTANEOUS at 13:25

## 2023-06-05 RX ADMIN — SODIUM CHLORIDE 1 GRAM(S): 9 INJECTION INTRAMUSCULAR; INTRAVENOUS; SUBCUTANEOUS at 06:13

## 2023-06-05 RX ADMIN — Medication 300 MILLIGRAM(S): at 21:59

## 2023-06-05 RX ADMIN — Medication 1 APPLICATION(S): at 13:25

## 2023-06-05 RX ADMIN — ARIPIPRAZOLE 15 MILLIGRAM(S): 15 TABLET ORAL at 13:25

## 2023-06-05 RX ADMIN — LOSARTAN POTASSIUM 100 MILLIGRAM(S): 100 TABLET, FILM COATED ORAL at 06:12

## 2023-06-05 RX ADMIN — Medication 2000 UNIT(S): at 13:32

## 2023-06-05 RX ADMIN — Medication 300 MILLIGRAM(S): at 06:12

## 2023-06-05 RX ADMIN — BUDESONIDE AND FORMOTEROL FUMARATE DIHYDRATE 2 PUFF(S): 160; 4.5 AEROSOL RESPIRATORY (INHALATION) at 22:00

## 2023-06-05 RX ADMIN — Medication 1 APPLICATION(S): at 06:13

## 2023-06-05 NOTE — BH CONSULTATION LIAISON PROGRESS NOTE - NSBHATTESTBILLING_PSY_A_CORE
63726-Udfpwmpdpd OBS or IP - moderate complexity OR 35-49 mins
00865-Jvxlgnswbm OBS or IP - moderate complexity OR 35-49 mins
15550-Nlipcqekkq OBS or IP - moderate complexity OR 35-49 mins
22373-Bcedlcfmnp OBS or IP - moderate complexity OR 35-49 mins
02877-Cvytbiknzl OBS or IP - high complexity OR 50-79 mins
04558-Zljqrwdphb OBS or IP - moderate complexity OR 35-49 mins
15022-Zclbmpgvdp OBS or IP - moderate complexity OR 35-49 mins
59298-Gafxviaexi OBS or IP - moderate complexity OR 35-49 mins
98334-Yutlrbxphl OBS or IP - low complexity OR 25-34 mins

## 2023-06-05 NOTE — BH CONSULTATION LIAISON PROGRESS NOTE - NSBHATTESTTYPEVISIT_PSY_A_CORE
Attending Only
Attending with Resident/Fellow/Student
Attending with Resident/Fellow/Student
Resident/Fellow with telephonic supervision
Attending with Resident/Fellow/Student

## 2023-06-05 NOTE — PROGRESS NOTE ADULT - PROBLEM SELECTOR PLAN 5
PT with h/o CVID requiring IgG infusions q1mo (last 4/13/2023).  OP Immunologist: Dr. Mitchell Boxer ([facility])  IGg level low at 489   s/p gammagard 75 grams on 5/8. D/w A&I fellow Dr. Kimura on 5/10, no need to re-dose with gammagard S/D at this time. Can resume usual monthly infusions after discharge.    - Allergy and Immunology recs appreciated   -recorded allergy to amoxicillin and penicillin however tolerated augmentin in the past, may re-address allergy if he needs a beta lactem

## 2023-06-05 NOTE — BH CONSULTATION LIAISON PROGRESS NOTE - CURRENT MEDICATION
MEDICATIONS  (STANDING):  ARIPiprazole 10 milliGRAM(s) Oral daily  atorvastatin 40 milliGRAM(s) Oral at bedtime  budesonide 160 MICROgram(s)/formoterol 4.5 MICROgram(s) Inhaler 2 Puff(s) Inhalation two times a day  cholecalciferol 2000 Unit(s) Oral daily  Dakins Solution - 1/4 Strength 1 Application(s) Topical two times a day  diltiazem    milliGRAM(s) Oral daily  enoxaparin Injectable 40 milliGRAM(s) SubCutaneous every 24 hours  lidocaine   4% Patch 1 Patch Transdermal every 24 hours  losartan 100 milliGRAM(s) Oral daily  sodium chloride 1 Gram(s) Oral three times a day    MEDICATIONS  (PRN):  acetaminophen     Tablet .. 975 milliGRAM(s) Oral every 6 hours PRN Temp greater or equal to 38C (100.4F), Mild Pain (1 - 3), Moderate Pain (4 - 6), Severe Pain (7 - 10)  albuterol    90 MICROgram(s) HFA Inhaler 2 Puff(s) Inhalation every 6 hours PRN Shortness of Breath and/or Wheezing  OLANZapine 5 milliGRAM(s) Oral every 6 hours PRN Agitation  
MEDICATIONS  (STANDING):  ARIPiprazole 15 milliGRAM(s) Oral daily  atorvastatin 40 milliGRAM(s) Oral at bedtime  budesonide 160 MICROgram(s)/formoterol 4.5 MICROgram(s) Inhaler 2 Puff(s) Inhalation two times a day  cholecalciferol 2000 Unit(s) Oral daily  Dakins Solution - 1/4 Strength 1 Application(s) Topical two times a day  diltiazem    milliGRAM(s) Oral daily  enoxaparin Injectable 40 milliGRAM(s) SubCutaneous every 24 hours  lidocaine   4% Patch 1 Patch Transdermal every 24 hours  losartan 100 milliGRAM(s) Oral daily  sodium chloride 1 Gram(s) Oral three times a day  vancomycin  IVPB 1500 milliGRAM(s) IV Intermittent every 8 hours    MEDICATIONS  (PRN):  acetaminophen     Tablet .. 975 milliGRAM(s) Oral every 6 hours PRN Temp greater or equal to 38C (100.4F), Mild Pain (1 - 3), Moderate Pain (4 - 6), Severe Pain (7 - 10)  albuterol    90 MICROgram(s) HFA Inhaler 2 Puff(s) Inhalation every 6 hours PRN Shortness of Breath and/or Wheezing  OLANZapine 5 milliGRAM(s) Oral every 6 hours PRN Agitation  
MEDICATIONS  (STANDING):  ARIPiprazole 15 milliGRAM(s) Oral daily  atorvastatin 40 milliGRAM(s) Oral at bedtime  budesonide 160 MICROgram(s)/formoterol 4.5 MICROgram(s) Inhaler 2 Puff(s) Inhalation two times a day  cholecalciferol 2000 Unit(s) Oral daily  Dakins Solution - 1/4 Strength 1 Application(s) Topical two times a day  diltiazem    milliGRAM(s) Oral daily  enoxaparin Injectable 40 milliGRAM(s) SubCutaneous every 24 hours  lidocaine   4% Patch 1 Patch Transdermal every 24 hours  losartan 100 milliGRAM(s) Oral daily  sodium chloride 1 Gram(s) Oral three times a day  vancomycin  IVPB 1500 milliGRAM(s) IV Intermittent every 12 hours    MEDICATIONS  (PRN):  acetaminophen     Tablet .. 975 milliGRAM(s) Oral every 6 hours PRN Temp greater or equal to 38C (100.4F), Mild Pain (1 - 3), Moderate Pain (4 - 6), Severe Pain (7 - 10)  albuterol    90 MICROgram(s) HFA Inhaler 2 Puff(s) Inhalation every 6 hours PRN Shortness of Breath and/or Wheezing  OLANZapine 5 milliGRAM(s) Oral every 6 hours PRN Agitation  
MEDICATIONS  (STANDING):  ARIPiprazole 15 milliGRAM(s) Oral daily  atorvastatin 40 milliGRAM(s) Oral at bedtime  budesonide 160 MICROgram(s)/formoterol 4.5 MICROgram(s) Inhaler 2 Puff(s) Inhalation two times a day  cholecalciferol 2000 Unit(s) Oral daily  Dakins Solution - 1/4 Strength 1 Application(s) Topical two times a day  diltiazem    milliGRAM(s) Oral daily  enoxaparin Injectable 40 milliGRAM(s) SubCutaneous every 24 hours  lidocaine   4% Patch 1 Patch Transdermal every 24 hours  losartan 100 milliGRAM(s) Oral daily  sodium chloride 1 Gram(s) Oral three times a day  vancomycin  IVPB 1500 milliGRAM(s) IV Intermittent every 12 hours    MEDICATIONS  (PRN):  acetaminophen     Tablet .. 975 milliGRAM(s) Oral every 6 hours PRN Temp greater or equal to 38C (100.4F), Mild Pain (1 - 3), Moderate Pain (4 - 6), Severe Pain (7 - 10)  albuterol    90 MICROgram(s) HFA Inhaler 2 Puff(s) Inhalation every 6 hours PRN Shortness of Breath and/or Wheezing  OLANZapine 5 milliGRAM(s) Oral every 6 hours PRN Agitation  
MEDICATIONS  (STANDING):  ARIPiprazole 10 milliGRAM(s) Oral daily  atorvastatin 40 milliGRAM(s) Oral at bedtime  budesonide 160 MICROgram(s)/formoterol 4.5 MICROgram(s) Inhaler 2 Puff(s) Inhalation two times a day  cholecalciferol 2000 Unit(s) Oral daily  Dakins Solution - 1/4 Strength 1 Application(s) Topical two times a day  diltiazem    milliGRAM(s) Oral daily  enoxaparin Injectable 40 milliGRAM(s) SubCutaneous every 24 hours  lidocaine   4% Patch 1 Patch Transdermal every 24 hours  losartan 100 milliGRAM(s) Oral daily  vancomycin  IVPB 1500 milliGRAM(s) IV Intermittent every 12 hours    MEDICATIONS  (PRN):  acetaminophen     Tablet .. 975 milliGRAM(s) Oral every 6 hours PRN Temp greater or equal to 38C (100.4F), Mild Pain (1 - 3), Moderate Pain (4 - 6), Severe Pain (7 - 10)  albuterol    90 MICROgram(s) HFA Inhaler 2 Puff(s) Inhalation every 6 hours PRN Shortness of Breath and/or Wheezing  OLANZapine 5 milliGRAM(s) Oral every 6 hours PRN Agitation  oxyCODONE    IR 5 milliGRAM(s) Oral every 4 hours PRN Moderate Pain (4 - 6)  
MEDICATIONS  (STANDING):  ARIPiprazole 5 milliGRAM(s) Oral daily  atorvastatin 40 milliGRAM(s) Oral at bedtime  budesonide 160 MICROgram(s)/formoterol 4.5 MICROgram(s) Inhaler 2 Puff(s) Inhalation two times a day  cholecalciferol 2000 Unit(s) Oral daily  Dakins Solution - 1/4 Strength 1 Application(s) Topical two times a day  diltiazem    milliGRAM(s) Oral daily  losartan 100 milliGRAM(s) Oral daily  vancomycin  IVPB      vancomycin  IVPB 1500 milliGRAM(s) IV Intermittent every 8 hours    MEDICATIONS  (PRN):  acetaminophen     Tablet .. 975 milliGRAM(s) Oral every 6 hours PRN Temp greater or equal to 38C (100.4F), Mild Pain (1 - 3), Moderate Pain (4 - 6), Severe Pain (7 - 10)  albuterol    90 MICROgram(s) HFA Inhaler 2 Puff(s) Inhalation every 6 hours PRN Shortness of Breath and/or Wheezing  OLANZapine 5 milliGRAM(s) Oral every 6 hours PRN Agitation  oxyCODONE    IR 5 milliGRAM(s) Oral every 4 hours PRN Moderate Pain (4 - 6)  
MEDICATIONS  (STANDING):  ARIPiprazole 5 milliGRAM(s) Oral daily  atorvastatin 40 milliGRAM(s) Oral at bedtime  budesonide 160 MICROgram(s)/formoterol 4.5 MICROgram(s) Inhaler 2 Puff(s) Inhalation two times a day  cholecalciferol 2000 Unit(s) Oral daily  Dakins Solution - 1/4 Strength 1 Application(s) Topical two times a day  diltiazem    milliGRAM(s) Oral daily  lidocaine 1% Injectable 30 milliLiter(s) Local Injection once  losartan 100 milliGRAM(s) Oral daily  vancomycin  IVPB 1250 milliGRAM(s) IV Intermittent every 12 hours    MEDICATIONS  (PRN):  acetaminophen     Tablet .. 975 milliGRAM(s) Oral every 6 hours PRN Temp greater or equal to 38C (100.4F), Mild Pain (1 - 3), Moderate Pain (4 - 6), Severe Pain (7 - 10)  albuterol    90 MICROgram(s) HFA Inhaler 2 Puff(s) Inhalation every 6 hours PRN Shortness of Breath and/or Wheezing  OLANZapine 5 milliGRAM(s) Oral every 6 hours PRN Agitation  oxyCODONE    IR 5 milliGRAM(s) Oral every 4 hours PRN Moderate Pain (4 - 6)  
MEDICATIONS  (STANDING):  atorvastatin 40 milliGRAM(s) Oral at bedtime  budesonide 160 MICROgram(s)/formoterol 4.5 MICROgram(s) Inhaler 2 Puff(s) Inhalation two times a day  cholecalciferol 2000 Unit(s) Oral daily  Dakins Solution - 1/4 Strength 1 Application(s) Topical two times a day  dextrose 5%. 1000 milliLiter(s) (50 mL/Hr) IV Continuous <Continuous>  dextrose 5%. 1000 milliLiter(s) (100 mL/Hr) IV Continuous <Continuous>  dextrose 50% Injectable 25 Gram(s) IV Push once  dextrose 50% Injectable 12.5 Gram(s) IV Push once  dextrose 50% Injectable 25 Gram(s) IV Push once  diltiazem    milliGRAM(s) Oral daily  glucagon  Injectable 1 milliGRAM(s) IntraMuscular once  insulin lispro (ADMELOG) corrective regimen sliding scale   SubCutaneous at bedtime  insulin lispro (ADMELOG) corrective regimen sliding scale   SubCutaneous three times a day before meals  losartan 100 milliGRAM(s) Oral daily  vancomycin  IVPB 1250 milliGRAM(s) IV Intermittent every 12 hours    MEDICATIONS  (PRN):  acetaminophen     Tablet .. 975 milliGRAM(s) Oral every 6 hours PRN Temp greater or equal to 38C (100.4F), Mild Pain (1 - 3), Moderate Pain (4 - 6), Severe Pain (7 - 10)  albuterol    90 MICROgram(s) HFA Inhaler 2 Puff(s) Inhalation every 6 hours PRN Shortness of Breath and/or Wheezing  dextrose Oral Gel 15 Gram(s) Oral once PRN Blood Glucose LESS THAN 70 milliGRAM(s)/deciliter  OLANZapine 5 milliGRAM(s) Oral every 6 hours PRN Agitation  oxyCODONE    IR 5 milliGRAM(s) Oral every 4 hours PRN Moderate Pain (4 - 6)  
MEDICATIONS  (STANDING):  ARIPiprazole 15 milliGRAM(s) Oral daily  atorvastatin 40 milliGRAM(s) Oral at bedtime  budesonide 160 MICROgram(s)/formoterol 4.5 MICROgram(s) Inhaler 2 Puff(s) Inhalation two times a day  cholecalciferol 2000 Unit(s) Oral daily  Dakins Solution - 1/4 Strength 1 Application(s) Topical two times a day  diltiazem    milliGRAM(s) Oral daily  enoxaparin Injectable 40 milliGRAM(s) SubCutaneous every 24 hours  lidocaine   4% Patch 1 Patch Transdermal every 24 hours  losartan 100 milliGRAM(s) Oral daily  sodium chloride 1 Gram(s) Oral three times a day  vancomycin  IVPB 1500 milliGRAM(s) IV Intermittent every 8 hours    MEDICATIONS  (PRN):  acetaminophen     Tablet .. 975 milliGRAM(s) Oral every 6 hours PRN Temp greater or equal to 38C (100.4F), Mild Pain (1 - 3), Moderate Pain (4 - 6), Severe Pain (7 - 10)  albuterol    90 MICROgram(s) HFA Inhaler 2 Puff(s) Inhalation every 6 hours PRN Shortness of Breath and/or Wheezing  OLANZapine 5 milliGRAM(s) Oral every 6 hours PRN Agitation

## 2023-06-05 NOTE — PROGRESS NOTE ADULT - ASSESSMENT
Mr. 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 Mr. Cristina is a 57M with h/o Schizoaffective D/O (Bipolar Type), CVID/Hypogammaglobulinemia (monthly IVIG - last 4/13), HTN, T2DM (A1c 6.3% 02/2023; metformin), prior history of left gluteal wound requiring debridement  who presents with sepsis as evidenced by tachycardia, leukocytosis, iso severe right gluteal wound c/f MRSA vs polymicrobial infection given h/o MRSA & CVID on vanc till 6/5

## 2023-06-05 NOTE — BH CONSULTATION LIAISON PROGRESS NOTE - NSBHCHARTREVIEWVS_PSY_A_CORE FT
Vital Signs Last 24 Hrs  T(C): 36.6 (10 May 2023 06:02), Max: 37 (09 May 2023 15:10)  T(F): 97.9 (10 May 2023 06:02), Max: 98.6 (09 May 2023 15:10)  HR: 77 (10 May 2023 06:02) (77 - 88)  BP: 153/94 (10 May 2023 06:02) (153/84 - 164/71)  BP(mean): --  RR: 18 (10 May 2023 06:02) (18 - 19)  SpO2: 93% (10 May 2023 06:02) (92% - 94%)    Parameters below as of 10 May 2023 06:02  Patient On (Oxygen Delivery Method): room air    
Vital Signs Last 24 Hrs  T(C): 36.2 (05 Jun 2023 13:00), Max: 37 (04 Jun 2023 17:35)  T(F): 97.1 (05 Jun 2023 13:00), Max: 98.6 (04 Jun 2023 17:35)  HR: 67 (05 Jun 2023 13:00) (65 - 102)  BP: 137/71 (05 Jun 2023 13:00) (135/76 - 157/83)  BP(mean): --  RR: 18 (05 Jun 2023 13:00) (18 - 18)  SpO2: 100% (05 Jun 2023 13:00) (98% - 100%)    Parameters below as of 05 Jun 2023 13:00  Patient On (Oxygen Delivery Method): room air    
Vital Signs Last 24 Hrs  T(C): 36.5 (09 May 2023 05:30), Max: 37 (08 May 2023 17:33)  T(F): 97.7 (09 May 2023 05:30), Max: 98.6 (08 May 2023 17:33)  HR: 93 (09 May 2023 05:30) (77 - 93)  BP: 177/94 (09 May 2023 05:30) (142/83 - 177/94)  BP(mean): --  RR: 19 (09 May 2023 05:30) (17 - 19)  SpO2: 94% (09 May 2023 05:30) (91% - 98%)    Parameters below as of 09 May 2023 05:30  Patient On (Oxygen Delivery Method): room air    
Vital Signs Last 24 Hrs  T(C): 36.7 (02 Jun 2023 05:23), Max: 36.7 (01 Jun 2023 13:00)  T(F): 98.1 (02 Jun 2023 05:23), Max: 98.1 (01 Jun 2023 13:00)  HR: 70 (02 Jun 2023 05:23) (57 - 74)  BP: 137/77 (02 Jun 2023 05:23) (134/77 - 157/64)  BP(mean): --  RR: 17 (02 Jun 2023 05:23) (17 - 18)  SpO2: 96% (02 Jun 2023 05:23) (95% - 96%)    Parameters below as of 02 Jun 2023 05:23  Patient On (Oxygen Delivery Method): room air    
Vital Signs Last 24 Hrs  T(C): 36.4 (15 May 2023 05:40), Max: 37 (14 May 2023 22:00)  T(F): 97.5 (15 May 2023 05:40), Max: 98.6 (14 May 2023 22:00)  HR: 84 (15 May 2023 13:22) (80 - 84)  BP: 144/82 (15 May 2023 13:22) (144/82 - 152/84)  BP(mean): --  RR: 17 (15 May 2023 13:22) (17 - 20)  SpO2: 92% (15 May 2023 13:22) (91% - 95%)    Parameters below as of 15 May 2023 13:22  Patient On (Oxygen Delivery Method): room air    
Vital Signs Last 24 Hrs  T(C): 36.5 (22 May 2023 13:06), Max: 36.6 (22 May 2023 05:38)  T(F): 97.7 (22 May 2023 13:06), Max: 97.9 (22 May 2023 05:38)  HR: 63 (22 May 2023 13:06) (61 - 68)  BP: 130/77 (22 May 2023 13:06) (130/74 - 148/76)  BP(mean): --  RR: 20 (22 May 2023 13:06) (18 - 20)  SpO2: 93% (22 May 2023 13:06) (93% - 95%)    Parameters below as of 22 May 2023 13:06  Patient On (Oxygen Delivery Method): room air    
Vital Signs Last 24 Hrs  T(C): 37.1 (12 May 2023 05:13), Max: 37.1 (12 May 2023 05:13)  T(F): 98.8 (12 May 2023 05:13), Max: 98.8 (12 May 2023 05:13)  HR: 88 (12 May 2023 05:13) (61 - 88)  BP: 140/81 (12 May 2023 05:13) (129/66 - 140/81)  BP(mean): --  RR: 18 (12 May 2023 05:13) (18 - 18)  SpO2: 96% (12 May 2023 05:13) (95% - 96%)    Parameters below as of 12 May 2023 05:13  Patient On (Oxygen Delivery Method): nasal cannula    
Vital Signs Last 24 Hrs  T(C): 36.7 (11 May 2023 05:34), Max: 36.7 (11 May 2023 05:34)  T(F): 98.1 (11 May 2023 05:34), Max: 98.1 (11 May 2023 05:34)  HR: 81 (11 May 2023 05:34) (65 - 81)  BP: 144/79 (11 May 2023 05:34) (144/79 - 154/75)  BP(mean): --  RR: 17 (11 May 2023 05:34) (17 - 18)  SpO2: 93% (11 May 2023 05:34) (93% - 96%)    Parameters below as of 11 May 2023 05:34  Patient On (Oxygen Delivery Method): room air    
Vital Signs Last 24 Hrs  T(C): 36.6 (24 May 2023 05:09), Max: 36.6 (24 May 2023 05:09)  T(F): 97.9 (24 May 2023 05:09), Max: 97.9 (24 May 2023 05:09)  HR: 72 (24 May 2023 06:41) (60 - 73)  BP: 149/81 (24 May 2023 06:41) (133/84 - 149/81)  BP(mean): --  RR: 17 (24 May 2023 05:09) (17 - 17)  SpO2: 100% (24 May 2023 05:09) (93% - 100%)    Parameters below as of 24 May 2023 05:09  Patient On (Oxygen Delivery Method): room air

## 2023-06-05 NOTE — PROGRESS NOTE ADULT - PROBLEM SELECTOR PLAN 3
Pt with h/o variable hyponatremia. SOsm calc c/w hypotonic hyponatremia. Farrukh low suggestive of sodium-avid state (i.e. RAAS activation), UOsm quite low, though c/f psychogenic polydipsia vs poor solute intake.. Unclear contribution of ARB as OP as pt not filling medications consistently.   likely 2/2 SIADH as improved with fluid restriction   - Trend Na   - fluid restriction to 1200  - c/w salt tabs  - repeat urine studies Pt with h/o variable hyponatremia. SOsm calc c/w hypotonic hyponatremia. Farrukh low suggestive of sodium-avid state (i.e. RAAS activation), UOsm quite low, though c/f psychogenic polydipsia vs poor solute intake.. Unclear contribution of ARB as OP as pt not filling medications consistently.   likely 2/2 SIADH as improved with fluid restriction   - Trend Na   - fluid restriction to 1200  - c/w salt tabs

## 2023-06-05 NOTE — PROGRESS NOTE ADULT - PROBLEM SELECTOR PLAN 10
Diet: CC  DVT: lovenox  Dispo:  now rec'ing home, will f/u with CM 6/5 regarding safe dispo home Diet: CC  DVT: lovenox  Dispo:  now rec'ing home, likely d/c home on 6/6

## 2023-06-05 NOTE — BH CONSULTATION LIAISON PROGRESS NOTE - NSBHASSESSMENTFT_PSY_ALL_CORE
56M, domiciled in supportive housing TSI, SSD, single, PMH HTN, DM, hypogammaglobulinemia, PPHx schizoaffective d/o, bipolar d/o, hx of multiple psych hospitalizations (last known in 2020 at [facility]), denies hx SA/NSSIB, denies drug or alcohol use, denies legal hx, who presented with right gluteal wound, admitted to medicine for cellulutis. Psychiatry consulted for psychosis/schizophrenia.    On assessment, patient is calm and cooperative. AAO3. No overt symptoms of psychosis, depression, or yo. No S/I or H/I. Patient w/ recurrent/chronic infections. Patient has left multiple hospitals AMA while undergoing treatment for cellulitis. However, patient recognizes benefit of continuing w/ antibiotic treatment, as well as risks of discontinuing treatment. Patient not currently refusing treatment or attempting to leave AMA. Patient interested in reconnection to outpatient care after discharge. Patient was due for Haldol decanoate 150mg on 4/16. Will obtain collateral see when last received.     5/9: no interval change. no overt positive symptoms of psychosis. patient w/ likely negative/cognitive symptoms that impair ability to adequately care for self (e.g. likely contribute to poor hygiene, recurrent infections, etc.). no si or hi. interested in resuming outpatient care and restarting psychotropics. recommend aripiprazole 5mg qd for psychosis/mood(w/ reported history of depression).     5/10: status quo. no psychosis or depression. patient w/ impaired ability to care for self, likely a result of negative/cognitive symptoms. per chart review, patient has not seen outpt psychiatrist since 9/22. cannot leave AMA. will consider inpatient psych once medically cleared.     5/11: feels physically better. mood improved. no overt positive psychotic symptoms. can increase aripiprazole to 10mg qd.     5/12: minimal interval change. pleasant, calm, cooperative, likely cognitively limited due to chronic SMI. c/w aripiprazole 10mg    5/13: status quo. no mood or psychotic sx. in good behavioral control. can increase aripiprazole to 15mg.      5/22: pleasant, cooperative, adherent w/ medical and psychiatric treatment. no overt psychosis. plan for abilify injection tomorrow.     5/24: remains pleasant, cooperative. no psychosis. s/p abilify BELLA.    6/2: remains pleasant, cooperative, no psychosis.     6/5: remained pleasant and cooperative. no psychosis.     Plan:  - Routine observation, no psychiatric indication for CO 1:1  - c/w aripiprazole 15mg qd - REQUIRES 2 WEEKS OF ARIPIPRAZOLE 15MG QD ORAL OVERLAP  - abilify maintenna 300mg injection 5/23 - [id number]  - Zyprexa 2.5-5mg PO/IM/IV q6hrs PRN for agitation  - CANNOT LEAVE AMA  - Dispo: patient requires PICC until 6/5. Psychiatrically continuing to do better. After this DOES NOT require inpt psych. Can return home and follow-up with Dr. Plascencia (outpt psych at [facility] with whom we are in touch; appt date to be made and communicated). 56M, domiciled in supportive housing TSI, SSD, single, PMH HTN, DM, hypogammaglobulinemia, PPHx schizoaffective d/o, bipolar d/o, hx of multiple psych hospitalizations (last known in 2020 at [facility]), denies hx SA/NSSIB, denies drug or alcohol use, denies legal hx, who presented with right gluteal wound, admitted to medicine for cellulutis. Psychiatry consulted for psychosis/schizophrenia.    On assessment, patient is calm and cooperative. AAO3. No overt symptoms of psychosis, depression, or yo. No S/I or H/I. Patient w/ recurrent/chronic infections. Patient has left multiple hospitals AMA while undergoing treatment for cellulitis. However, patient recognizes benefit of continuing w/ antibiotic treatment, as well as risks of discontinuing treatment. Patient not currently refusing treatment or attempting to leave AMA. Patient interested in reconnection to outpatient care after discharge. Patient was due for Haldol decanoate 150mg on 4/16. Will obtain collateral see when last received.     5/9: no interval change. no overt positive symptoms of psychosis. patient w/ likely negative/cognitive symptoms that impair ability to adequately care for self (e.g. likely contribute to poor hygiene, recurrent infections, etc.). no si or hi. interested in resuming outpatient care and restarting psychotropics. recommend aripiprazole 5mg qd for psychosis/mood(w/ reported history of depression).     5/10: status quo. no psychosis or depression. patient w/ impaired ability to care for self, likely a result of negative/cognitive symptoms. per chart review, patient has not seen outpt psychiatrist since 9/22. cannot leave AMA. will consider inpatient psych once medically cleared.     5/11: feels physically better. mood improved. no overt positive psychotic symptoms. can increase aripiprazole to 10mg qd.     5/12: minimal interval change. pleasant, calm, cooperative, likely cognitively limited due to chronic SMI. c/w aripiprazole 10mg    5/13: status quo. no mood or psychotic sx. in good behavioral control. can increase aripiprazole to 15mg.      5/22: pleasant, cooperative, adherent w/ medical and psychiatric treatment. no overt psychosis. plan for abilify injection tomorrow.     5/24: remains pleasant, cooperative. no psychosis. s/p abilify BELLA.    6/2: remains pleasant, cooperative, no psychosis.     6/5: remained pleasant and cooperative. no psychosis.     Plan:  - Routine observation, no psychiatric indication for CO 1:1  - c/w aripiprazole 15mg qd - REQUIRES 2 WEEKS OF ARIPIPRAZOLE 15MG QD ORAL OVERLAP  - abilify maintenna 300mg injection 5/23 - [id number]  - Zyprexa 2.5-5mg PO/IM/IV q6hrs PRN for agitation  - CANNOT LEAVE AMA  - Dispo: patient requires PICC until 6/5. Psychiatrically continuing to do better. After this DOES NOT require inpt psych. Can return home and follow-up with Dr. Cook (outpt psych at [facility] with whom we are in touch; appt date to be made and communicated).

## 2023-06-05 NOTE — BH CONSULTATION LIAISON PROGRESS NOTE - NSBHATTESTCOMMENTATTENDFT_PSY_A_CORE
agree with above, patient calm, cooperative, no signs of psychosis currently focused on own mental and physical well being, no si/hi, future orietned, no EPS on exam no akathisia, tolerating meds. No psych indication for involuntary psych commitment, amenable to f/u with usual psychiatrist Dr. Cook, handoff sent patient to f/u with MD within 1 week of DC

## 2023-06-05 NOTE — BH CONSULTATION LIAISON PROGRESS NOTE - NSBHFUPREASONCONS_PSY_A_CORE
med management
psychosis
med management
med management
psychosis
med management

## 2023-06-05 NOTE — PROGRESS NOTE ADULT - ATTENDING COMMENTS
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     #Sepsis from L gluteal MRSA abscess/cellulitis  #MRSA bacteremia   - ID following to complete Vanco IV today, 6/5  - Patient had required inpatient psychiatric facility for management - but unable to care for patient with PICC line d/t safety, therefore patient required inpatient admission to complete antibiotic course     #Schizoaffective disorder  -  following and patient currently clinically improved  - c/w aripiprazole 15mg qd   - Per , currently does NOT require inpatient psych, can follow-up with Dr. Plascencia as outpatient at [facility]    #Dispo  - D/w CM safe disposition for home after IV antibiotics completed. Will need wound care at home.     Discussed with patient   Discussed with HS Dr. 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     Patient seen and examined, reports he feels well and would like to go home. Reports that he was receiving wound care every other day for leg and buttocks wounds as outpatient.     #Sepsis from L gluteal MRSA abscess/cellulitis  #MRSA bacteremia   - ID following to complete Vanco IV today, 6/5  - Patient had required inpatient psychiatric facility for management - but unable to care for patient with PICC line d/t safety, therefore patient required inpatient admission to complete antibiotic course     #Schizoaffective disorder  -  following and patient currently clinically improved  - c/w aripiprazole 15mg qd   - Per , currently does NOT require inpatient psych, can follow-up with Dr. Plascencia as outpatient at [facility]    #Dispo  - D/w CM safe disposition for home after IV antibiotics completed. Will need wound care at home.     Discussed with patient   Discussed with HS Dr. Saeed

## 2023-06-05 NOTE — BH CONSULTATION LIAISON PROGRESS NOTE - NSBHPTASSESSDT_PSY_A_CORE
22-May-2023 14:58
02-Jun-2023 11:47
12-May-2023 12:19
24-May-2023 12:03
15-May-2023 14:12
05-Jun-2023 14:30
09-May-2023 12:08
11-May-2023 12:13
10-May-2023 11:41

## 2023-06-05 NOTE — BH CONSULTATION LIAISON PROGRESS NOTE - MSE UNSTRUCTURED FT
Pt is A&Ox3, cooperative, pleasant, and future oriented. Pt wants to stick to his diet, quit smoking, take his medications, find a part time job, and follow up with his doctors. Denies akathisia, weakness, SI, HI, and auditory/visual hallucinations.
 On exam, he is AA o x 3, pleasant, cooperative, and future-oriented. Denies depressed mood, anxiety/panic, SI, HI, AVH, or paranoia. Wants to eat better, take care of himself, take his meds, and follow-up with doctors. Insight much improved.

## 2023-06-05 NOTE — BH CONSULTATION LIAISON PROGRESS NOTE - NSICDXBHPRIMARYDX_PSY_ALL_CORE
Schizoaffective disorder   F25.9  
Schizophrenia   F20.9  
Schizophrenia   F20.9  
Schizoaffective disorder   F25.9  

## 2023-06-05 NOTE — PROGRESS NOTE ADULT - SUBJECTIVE AND OBJECTIVE BOX
Patient is a 57y old  Male who presents with a chief complaint of MRSA bacteremia iso R buttock cellulitis (20 May 2023 05:42)      SUBJECTIVE :      MEDICATIONS  (STANDING):  ARIPiprazole 15 milliGRAM(s) Oral daily  atorvastatin 40 milliGRAM(s) Oral at bedtime  budesonide 160 MICROgram(s)/formoterol 4.5 MICROgram(s) Inhaler 2 Puff(s) Inhalation two times a day  cholecalciferol 2000 Unit(s) Oral daily  Dakins Solution - 1/4 Strength 1 Application(s) Topical two times a day  diltiazem    milliGRAM(s) Oral daily  enoxaparin Injectable 40 milliGRAM(s) SubCutaneous every 24 hours  lidocaine   4% Patch 1 Patch Transdermal every 24 hours  losartan 100 milliGRAM(s) Oral daily  sodium chloride 1 Gram(s) Oral three times a day  vancomycin  IVPB 1500 milliGRAM(s) IV Intermittent every 12 hours    MEDICATIONS  (PRN):  acetaminophen     Tablet .. 975 milliGRAM(s) Oral every 6 hours PRN Temp greater or equal to 38C (100.4F), Mild Pain (1 - 3), Moderate Pain (4 - 6), Severe Pain (7 - 10)  albuterol    90 MICROgram(s) HFA Inhaler 2 Puff(s) Inhalation every 6 hours PRN Shortness of Breath and/or Wheezing  OLANZapine 5 milliGRAM(s) Oral every 6 hours PRN Agitation      CAPILLARY BLOOD GLUCOSE        I&O's Summary    04 Jun 2023 07:01  -  05 Jun 2023 07:00  --------------------------------------------------------  IN: 900 mL / OUT: 0 mL / NET: 900 mL        PHYSICAL EXAM:  Vital Signs Last 24 Hrs  T(C): 36.5 (05 Jun 2023 05:30), Max: 37 (04 Jun 2023 17:35)  T(F): 97.7 (05 Jun 2023 05:30), Max: 98.6 (04 Jun 2023 17:35)  HR: 69 (05 Jun 2023 05:30) (65 - 102)  BP: 139/73 (05 Jun 2023 05:30) (135/76 - 157/83)  BP(mean): --  RR: 18 (05 Jun 2023 05:30) (18 - 18)  SpO2: 98% (05 Jun 2023 05:30) (98% - 100%)    Parameters below as of 05 Jun 2023 05:30  Patient On (Oxygen Delivery Method): room air        GENERAL: NAD, lying in bed comfortably  HEAD:  Atraumatic, Normocephalic  EYES: EOMI, PERRLA, conjunctiva and sclera clear, no nystagmus noted  CHEST/LUNG: Clear to auscultation bilaterally; No rales, rhonchi, wheezing, or rubs. Unlabored respirations  HEART: Regular rate and rhythm; No murmurs, rubs, or gallops, normal S1/S2  ABDOMEN: normal bowel sounds; Soft, nontender, nondistended, no organomegaly   EXTREMITIES:  2+ Peripheral Pulses, brisk capillary refill. No clubbing, cyanosis, or edema  Neurological:  A&Ox3, no focal deficits     LABS:                      RADIOLOGY & ADDITIONAL TESTS:  Results Reviewed:   Imaging Personally Reviewed:  Electrocardiogram Personally Reviewed:    COORDINATION OF CARE:  Care Discussed with Consultants/Other Providers [Y/N]:  Prior or Outpatient Records Reviewed [Y/N]:   Patient is a 57y old  Male who presents with a chief complaint of MRSA bacteremia iso R buttock cellulitis (20 May 2023 05:42)      SUBJECTIVE : NAEO. Pt seen and examined at bedside. Feels well overall. No acute complaints. Reports he has wound care already setup that takes care of his foot wound and his buttock wound.       MEDICATIONS  (STANDING):  ARIPiprazole 15 milliGRAM(s) Oral daily  atorvastatin 40 milliGRAM(s) Oral at bedtime  budesonide 160 MICROgram(s)/formoterol 4.5 MICROgram(s) Inhaler 2 Puff(s) Inhalation two times a day  cholecalciferol 2000 Unit(s) Oral daily  Dakins Solution - 1/4 Strength 1 Application(s) Topical two times a day  diltiazem    milliGRAM(s) Oral daily  enoxaparin Injectable 40 milliGRAM(s) SubCutaneous every 24 hours  lidocaine   4% Patch 1 Patch Transdermal every 24 hours  losartan 100 milliGRAM(s) Oral daily  sodium chloride 1 Gram(s) Oral three times a day  vancomycin  IVPB 1500 milliGRAM(s) IV Intermittent every 12 hours    MEDICATIONS  (PRN):  acetaminophen     Tablet .. 975 milliGRAM(s) Oral every 6 hours PRN Temp greater or equal to 38C (100.4F), Mild Pain (1 - 3), Moderate Pain (4 - 6), Severe Pain (7 - 10)  albuterol    90 MICROgram(s) HFA Inhaler 2 Puff(s) Inhalation every 6 hours PRN Shortness of Breath and/or Wheezing  OLANZapine 5 milliGRAM(s) Oral every 6 hours PRN Agitation      CAPILLARY BLOOD GLUCOSE        I&O's Summary    04 Jun 2023 07:01  -  05 Jun 2023 07:00  --------------------------------------------------------  IN: 900 mL / OUT: 0 mL / NET: 900 mL        PHYSICAL EXAM:  Vital Signs Last 24 Hrs  T(C): 36.5 (05 Jun 2023 05:30), Max: 37 (04 Jun 2023 17:35)  T(F): 97.7 (05 Jun 2023 05:30), Max: 98.6 (04 Jun 2023 17:35)  HR: 69 (05 Jun 2023 05:30) (65 - 102)  BP: 139/73 (05 Jun 2023 05:30) (135/76 - 157/83)  BP(mean): --  RR: 18 (05 Jun 2023 05:30) (18 - 18)  SpO2: 98% (05 Jun 2023 05:30) (98% - 100%)    Parameters below as of 05 Jun 2023 05:30  Patient On (Oxygen Delivery Method): room air        GENERAL: NAD, lying in bed comfortably  HEAD:  Atraumatic, Normocephalic  EYES: EOMI, conjunctiva and sclera clear, no nystagmus noted  CHEST/LUNG: Clear to auscultation bilaterally; No rales, rhonchi, wheezing, or rubs. Unlabored respirations  HEART: Regular rate and rhythm; No murmurs, rubs, or gallops, normal S1/S2  ABDOMEN: normal bowel sounds; Soft, nontender, nondistended, no organomegaly   EXTREMITIES:  2+ Peripheral Pulses, brisk capillary refill. No clubbing, cyanosis, or edema  Neurological:  A&Ox3, no focal deficits     LABS:                      RADIOLOGY & ADDITIONAL TESTS:  Results Reviewed:   Imaging Personally Reviewed:  Electrocardiogram Personally Reviewed:    COORDINATION OF CARE:  Care Discussed with Consultants/Other Providers [Y/N]:  Prior or Outpatient Records Reviewed [Y/N]:

## 2023-06-05 NOTE — BH CONSULTATION LIAISON PROGRESS NOTE - MSE OPTIONS
Structured MSE
Unstructured MSE
Unstructured MSE
Structured MSE

## 2023-06-05 NOTE — BH CONSULTATION LIAISON PROGRESS NOTE - NSBHFUPINTERVALHXFT_PSY_A_CORE
Chart reviewed. Adherent w/ medications. No PRNs given. Patient evaluated at the bedside. Pleasant, calm, cooperative, concerete. Feels better. Attempting to drink les water. Reports good mood. Denies psychotic symptoms, including paranoia, IOR, and A/H. Denies side effects from aripiprazole. Patient resistant but not hostile to idea of inpatient psych. 
Chart reviewed. Adherent w/ medications. No PRNs given. Patient evaluated at the bedside. Pleasant, engageed, concrete. Says he feels fine. Denies psychotic symptoms, including paranoia, IOR, and A/H. Mood fair. No depressive symptoms. Patient comments on water restriction (patient w/ most recent Na 126). Adds that he usually drinks gallons of water a day at home. 
No acute events overnight. Received PRN Olanzapine yesterday (6/4/23) afternoon. Pt is taking and tolerating all of his medications. Pt greets and remembers Dr. Carmona by name. Pt is sitting on his bed with his R leg shaking; when questioned about it, he states that it has been going on for years and attributes his current shaking to him being cold. Pt is A&Ox3, pleasant and cooperative on interview. Pt is future oriented, stating that he wants to quit smoking, stick to his diabetic diet, and find a part time job. When asked if he has spoken to his outpatient psychiatrist recently, Pt states that he hasn't but will do so for followup after he is discharged, as he is looking forward to being able to have in-person appointments again. Denies currently seeing a therapist and states that he feels like he doesn't need one right now as he has learned methods to take care of his mental health. Denies akathisia, weakness, SI, HI, and auditory/visual hallucinations. 
Chart reviewed. Pt has been in behavioral control for past week +. No PRNs. Taking/tolerating Abilify and complying with IV ABX/care. On exam, he is AA o x 3, pleasant, cooperative, and future-oriented. Denies depressed mood, anxiety/panic, SI, HI, AVH, or paranoia. Wants to eat better, take care of himself, take his meds, and follow-up with doctors. Insight much improved. 
Chart reviewed. Adherent w/ medications. No PRNs given. Patient evaluated at the bedside. Pleasant, cooperative. In good behavioral control. Feeling well. No new complaints. Denies psychotic symptoms, including paranoia, IOR, and A/H. Denies side effects from aripiprazole. 
Chart reviewed. Adherent w/ medications. No PRNs given. Patient evaluated at the bedside. Pleasant, engaged. Aware he is here for treatment of cellulitis. Reports feeling physically better. Denies psychotic symptoms, including paranoia, IOR, and A/H. Mood fair. Denies depressive symptoms. Interested in reconnection to o/p care at [facility]. Amenable to start new psychotropic for schizophrenia. 
Chart reviewed. Adherent w/ medications. No PRNs given. Patient evaluated at the bedside. Patient pleasant, cooperative, linear. Feeling well. Received Abilify BELLA yesterday. Denies psychotic symptoms, including paranoia, IOR, and A/H. No SI or HI. 
Chart reviewed. Adherent w/ medications. No PRNs given. Patient evaluated at the bedside. Remains pleasant, concrete. Feels physically better. Says mood is excellent. Denies psychotic symptoms, including paranoia, IOR, and A/H. Patient comments that he was trying to take good care of himself at home but "the infections keep coming back." 
Chart reviewed. Adherent w/ medications. No PRNs given. Patient evaluated at the bedside. Patient euthymic, pleasant, cooperative, linear. In good behavioral control. Feeling well. Recognizes importance of finishing abx tx and preventing future infection. Denies psychotic symptoms, including paranoia, IOR, and A/H.

## 2023-06-05 NOTE — BH CONSULTATION LIAISON PROGRESS NOTE - NSBHCONSULTFOLLOWAFTERCARE_PSY_A_CORE FT
Pt will follow-up with Dr. Plascencia (outpt psych at [facility] with whom we are in touch). Pt will follow-up with Dr. Cook (outpt psych at [facility] with whom we are in touch). [phone]

## 2023-06-05 NOTE — BH CONSULTATION LIAISON PROGRESS NOTE - NSBHFUPINTERVALCCFT_PSY_A_CORE
f/u psychosis
"I'm good"
f/u psychosis
"I'm good"
f/u psychosis

## 2023-06-06 ENCOUNTER — TRANSCRIPTION ENCOUNTER (OUTPATIENT)
Age: 58
End: 2023-06-06

## 2023-06-06 VITALS
HEART RATE: 100 BPM | TEMPERATURE: 98 F | DIASTOLIC BLOOD PRESSURE: 80 MMHG | RESPIRATION RATE: 19 BRPM | SYSTOLIC BLOOD PRESSURE: 153 MMHG | OXYGEN SATURATION: 100 %

## 2023-06-06 LAB
ANION GAP SERPL CALC-SCNC: 13 MMOL/L — SIGNIFICANT CHANGE UP (ref 7–14)
BUN SERPL-MCNC: 9 MG/DL — SIGNIFICANT CHANGE UP (ref 7–23)
CALCIUM SERPL-MCNC: 9.6 MG/DL — SIGNIFICANT CHANGE UP (ref 8.4–10.5)
CHLORIDE SERPL-SCNC: 97 MMOL/L — LOW (ref 98–107)
CO2 SERPL-SCNC: 25 MMOL/L — SIGNIFICANT CHANGE UP (ref 22–31)
CREAT SERPL-MCNC: 0.85 MG/DL — SIGNIFICANT CHANGE UP (ref 0.5–1.3)
EGFR: 103 ML/MIN/1.73M2 — SIGNIFICANT CHANGE UP
GLUCOSE SERPL-MCNC: 90 MG/DL — SIGNIFICANT CHANGE UP (ref 70–99)
MAGNESIUM SERPL-MCNC: 1.9 MG/DL — SIGNIFICANT CHANGE UP (ref 1.6–2.6)
PHOSPHATE SERPL-MCNC: 4.4 MG/DL — SIGNIFICANT CHANGE UP (ref 2.5–4.5)
POTASSIUM SERPL-MCNC: 4.2 MMOL/L — SIGNIFICANT CHANGE UP (ref 3.5–5.3)
POTASSIUM SERPL-SCNC: 4.2 MMOL/L — SIGNIFICANT CHANGE UP (ref 3.5–5.3)
SODIUM SERPL-SCNC: 135 MMOL/L — SIGNIFICANT CHANGE UP (ref 135–145)

## 2023-06-06 PROCEDURE — 99239 HOSP IP/OBS DSCHRG MGMT >30: CPT

## 2023-06-06 RX ORDER — SODIUM CHLORIDE 9 MG/ML
1 INJECTION INTRAMUSCULAR; INTRAVENOUS; SUBCUTANEOUS
Qty: 30 | Refills: 0
Start: 2023-06-06

## 2023-06-06 RX ORDER — HALOPERIDOL DECANOATE 100 MG/ML
150 INJECTION INTRAMUSCULAR
Qty: 0 | Refills: 0 | DISCHARGE

## 2023-06-06 RX ORDER — HYDROXYZINE HCL 10 MG
0 TABLET ORAL
Qty: 0 | Refills: 1 | DISCHARGE

## 2023-06-06 RX ADMIN — BUDESONIDE AND FORMOTEROL FUMARATE DIHYDRATE 2 PUFF(S): 160; 4.5 AEROSOL RESPIRATORY (INHALATION) at 09:38

## 2023-06-06 RX ADMIN — SODIUM CHLORIDE 1 GRAM(S): 9 INJECTION INTRAMUSCULAR; INTRAVENOUS; SUBCUTANEOUS at 07:06

## 2023-06-06 RX ADMIN — SODIUM CHLORIDE 1 GRAM(S): 9 INJECTION INTRAMUSCULAR; INTRAVENOUS; SUBCUTANEOUS at 14:42

## 2023-06-06 RX ADMIN — Medication 2000 UNIT(S): at 11:56

## 2023-06-06 RX ADMIN — ARIPIPRAZOLE 15 MILLIGRAM(S): 15 TABLET ORAL at 11:56

## 2023-06-06 RX ADMIN — Medication 300 MILLIGRAM(S): at 07:06

## 2023-06-06 RX ADMIN — LOSARTAN POTASSIUM 100 MILLIGRAM(S): 100 TABLET, FILM COATED ORAL at 07:06

## 2023-06-06 RX ADMIN — ENOXAPARIN SODIUM 40 MILLIGRAM(S): 100 INJECTION SUBCUTANEOUS at 11:56

## 2023-06-06 NOTE — PROGRESS NOTE ADULT - SUBJECTIVE AND OBJECTIVE BOX
PROGRESS NOTE:     Patient is a 57y old  Male who presents with a chief complaint of OM (05 Jun 2023 07:05)      SUBJECTIVE / OVERNIGHT EVENTS:  No acute events overnight.     MEDICATIONS  (STANDING):  ARIPiprazole 15 milliGRAM(s) Oral daily  atorvastatin 40 milliGRAM(s) Oral at bedtime  budesonide 160 MICROgram(s)/formoterol 4.5 MICROgram(s) Inhaler 2 Puff(s) Inhalation two times a day  cholecalciferol 2000 Unit(s) Oral daily  Dakins Solution - 1/4 Strength 1 Application(s) Topical two times a day  diltiazem    milliGRAM(s) Oral daily  enoxaparin Injectable 40 milliGRAM(s) SubCutaneous every 24 hours  lidocaine   4% Patch 1 Patch Transdermal every 24 hours  losartan 100 milliGRAM(s) Oral daily  sodium chloride 1 Gram(s) Oral three times a day    MEDICATIONS  (PRN):  acetaminophen     Tablet .. 975 milliGRAM(s) Oral every 6 hours PRN Temp greater or equal to 38C (100.4F), Mild Pain (1 - 3), Moderate Pain (4 - 6), Severe Pain (7 - 10)  albuterol    90 MICROgram(s) HFA Inhaler 2 Puff(s) Inhalation every 6 hours PRN Shortness of Breath and/or Wheezing  OLANZapine 5 milliGRAM(s) Oral every 6 hours PRN Agitation      CAPILLARY BLOOD GLUCOSE        I&O's Summary      PHYSICAL EXAM:  Vital Signs Last 24 Hrs  T(C): 36.7 (06 Jun 2023 04:39), Max: 37.1 (05 Jun 2023 17:35)  T(F): 98 (06 Jun 2023 04:39), Max: 98.7 (05 Jun 2023 17:35)  HR: 75 (06 Jun 2023 04:39) (61 - 76)  BP: 147/88 (06 Jun 2023 04:39) (132/71 - 147/88)  BP(mean): --  RR: 18 (06 Jun 2023 04:39) (18 - 18)  SpO2: 100% (06 Jun 2023 04:39) (97% - 100%)    Parameters below as of 06 Jun 2023 04:39  Patient On (Oxygen Delivery Method): room air        CONSTITUTIONAL: NAD, well-developed  RESPIRATORY: Normal respiratory effort; lungs are clear to auscultation bilaterally  CARDIOVASCULAR: Regular rate and rhythm, normal S1 and S2, no murmur/rub/gallop; No lower extremity edema; Peripheral pulses are 2+ bilaterally  ABDOMEN: Nontender to palpation, normoactive bowel sounds, no rebound/guarding; No hepatosplenomegaly  MUSCLOSKELETAL: no clubbing or cyanosis of digits; no joint swelling or tenderness to palpation  PSYCH: A+O to person, place, and time; affect appropriate    LABS:    06-06    135  |  97<L>  |  9   ----------------------------<  90  4.2   |  25  |  0.85    Ca    9.6      06 Jun 2023 05:03  Phos  4.4     06-06  Mg     1.90     06-06

## 2023-06-06 NOTE — PROGRESS NOTE ADULT - ATTENDING COMMENTS
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     Patient seen and examined, reports he feels well and would like to go home. Reports that he is trying to continue to eat healthy and lose weight once he goes home to prevent the infection from reoccurring.     #Sepsis from L gluteal MRSA abscess/cellulitis  #MRSA bacteremia   - ID following to complete Vanco IV today, 6/5  - Patient had required inpatient psychiatric facility for management - but unable to care for patient with PICC line d/t safety, therefore patient required inpatient admission to complete antibiotic course     #Schizoaffective disorder  -  following and patient currently clinically improved  - c/w aripiprazole 15mg qd   - Per , currently does NOT require inpatient psych, can follow-up with Dr. Plascencia as outpatient at [facility]    #Dispo  - D/w CM safe disposition for home today if wound care is confirmed.   - Will follow-up with PCP Dr. Lai on discharge ([facility])     Discussed with patient   Discussed with HS Dr. Stevenson

## 2023-06-06 NOTE — PROGRESS NOTE ADULT - PROBLEM SELECTOR PLAN 8
- C/W Home Atorvastatin 40mg PO QHS

## 2023-06-06 NOTE — PROGRESS NOTE ADULT - REASON FOR ADMISSION
Gluteal wound
R gluteal wound
Cellulitis, R buttock wound
L gluteal wound
MRSA bacteremia iso R buttock cellulitis
OM
OM

## 2023-06-06 NOTE — PROGRESS NOTE ADULT - PROBLEM SELECTOR PROBLEM 2
Schizoaffective disorder

## 2023-06-06 NOTE — PROGRESS NOTE ADULT - ATTENDING SUPERVISION STATEMENT
Fellow
Resident

## 2023-06-06 NOTE — PROGRESS NOTE ADULT - PROBLEM SELECTOR PROBLEM 6
Bronchiectasis

## 2023-06-06 NOTE — PROGRESS NOTE ADULT - PROBLEM SELECTOR PROBLEM 10
Need for prophylactic measure

## 2023-06-06 NOTE — PROGRESS NOTE ADULT - PROBLEM SELECTOR PROBLEM 7
Vitamin D deficiency

## 2023-06-06 NOTE — PROGRESS NOTE ADULT - PROVIDER SPECIALTY LIST ADULT
Infectious Disease
Internal Medicine
Internal Medicine
Surgery
Infectious Disease
Internal Medicine
Surgery
Wound Care
Infectious Disease
Infectious Disease
Surgery
Surgery
Wound Care
Internal Medicine

## 2023-06-06 NOTE — PROGRESS NOTE ADULT - ASSESSMENT
Mr. Cristina is a 57M with h/o Schizoaffective D/O (Bipolar Type), CVID/Hypogammaglobulinemia (monthly IVIG - last 4/13), HTN, T2DM (A1c 6.3% 02/2023; metformin), prior history of left gluteal wound requiring debridement  who presents with sepsis as evidenced by tachycardia, leukocytosis, iso severe right gluteal wound c/f MRSA vs polymicrobial infection given h/o MRSA & CVID on vanc till 6/5

## 2023-06-06 NOTE — PROGRESS NOTE ADULT - PROBLEM SELECTOR PLAN 3
Pt with h/o variable hyponatremia. SOsm calc c/w hypotonic hyponatremia. Farrukh low suggestive of sodium-avid state (i.e. RAAS activation), UOsm quite low, though c/f psychogenic polydipsia vs poor solute intake.. Unclear contribution of ARB as OP as pt not filling medications consistently.   likely 2/2 SIADH as improved with fluid restriction   - Trend Na   - fluid restriction to 1200  - c/w salt tabs

## 2023-06-06 NOTE — DISCHARGE NOTE NURSING/CASE MANAGEMENT/SOCIAL WORK - NSDCPEFALRISK_GEN_ALL_CORE
For information on Fall & Injury Prevention, visit: [email or web address] OR  [email or web address] OR  https://www.cdc.gov/steadi/patient.html

## 2023-06-06 NOTE — PROGRESS NOTE ADULT - PROBLEM SELECTOR PROBLEM 9
DM (diabetes mellitus)

## 2023-06-06 NOTE — DISCHARGE NOTE NURSING/CASE MANAGEMENT/SOCIAL WORK - PATIENT PORTAL LINK FT
You can access the FollowMyHealth Patient Portal offered by [facility] by registering at the following website: [email or web address]. By joining [facility]’s FollowMyHealth portal, you will also be able to view your health information using other applications (apps) compatible with our system.

## 2023-06-06 NOTE — PROGRESS NOTE ADULT - PROBLEM SELECTOR PLAN 9
#T2DM   5/8/23: A1C: 6.3 prediabetes  FS are low or within goal    may be contributing to poor wound healing. Pt hasn't picked up medications in quite some day.  -will monitor glucose daily   - Holding  Home Metformin 500mg PO BID
#T2DM   5/8/23: A1C: 6.3  may be contributing to poor wound healing. Pt hasn't picked up medications in quite some day.  - monitor FS  - HOLDing Home Metformin 500mg PO BID  - PAUL
#T2DM   5/8/23: A1C: 6.3 prediabetes  FS are low or within goal    may be contributing to poor wound healing. Pt hasn't picked up medications in quite some day.  -will monitor glucose daily   - Holding  Home Metformin 500mg PO BID

## 2023-06-06 NOTE — PROGRESS NOTE ADULT - PROBLEM SELECTOR PLAN 5
PT with h/o CVID requiring IgG infusions q1mo (last 4/13/2023).  OP Immunologist: Dr. Mitchell Boxer ([facility])  IGg level low at 489   s/p gammagard 75 grams on 5/8. D/w A&I fellow Dr. Kimura on 5/10, no need to re-dose with gammagard S/D at this time. Can resume usual monthly infusions after discharge.    - Allergy and Immunology recs appreciated   -recorded allergy to amoxicillin and penicillin however tolerated augmentin in the past, may re-address allergy if he needs a beta lactem

## 2023-06-06 NOTE — PROGRESS NOTE ADULT - PROBLEM SELECTOR PROBLEM 8
HLD (hyperlipidemia)
Need for prophylactic measure
HLD (hyperlipidemia)

## 2023-06-06 NOTE — PROGRESS NOTE ADULT - PROBLEM SELECTOR PROBLEM 1
Sepsis, unspecified organism

## 2023-06-07 NOTE — ED PROVIDER NOTE - IV ALTEPLASE INCLUSION HIDDEN
[facility]  Office Visit      Subjective:  Mr. Jesse Rodrigues is a 70 y.o. male s/p presented back to the clinic to reevaluate with Dr. Espinoza Strong for bypass surgery. Patient has had no chest pain shortness of breath nausea vomiting diarrhea fever chills. He has had a history of an old pacemaker put in over 40 years ago that is no longer working. There was no need for the pacemaker to ever be extracted. The battery has been dead for many years. Consult was completed on prior admission. He does not take any AC or AP medications. Dr. Espinoza Strong agrees for bypass surgery    Physical Exam  Vitals:  Vitals:    06/07/23 0855   BP: (!) 167/63   Pulse:    Temp:    SpO2:        General: Alert and Oriented x3. Ambulatory. No apparent distress. Chest:  No abnormality. Equal and symmetric expansion with respiration. Lungs:  Clear to auscultation. Cardiac:  Regular rate and rhythm without murmurs, rubs or gallops. Abdomen:  Soft, non-tender, normoactive bowel sounds. Extremities:  No edema. Intact pulses in all four extremities. Psychiatric: Mood and affect are appropriate.   Reviewed carotid US, vein mapping, echocardiogram and Ct chest.   Current Medications:    Current Outpatient Medications:     amLODIPine (NORVASC) 10 MG tablet, Take 1 tablet by mouth at bedtime, Disp: , Rfl:     hydrALAZINE (APRESOLINE) 100 MG tablet, TAKE 1 TABLET BY MOUTH THREE TIMES DAILY ,  EXCEPT  ON  THE  MORNINGS  OF  DIALYSIS (Patient taking differently: Take 1 tablet by mouth 3 times daily TAKE 1 TABLET BY MOUTH THREE TIMES DAILY), Disp: 90 tablet, Rfl: 3    White Petrolatum-Mineral Oil (REFRESH LACRI-LUBE) OINT ointment, Apply 0.5 inches to eye nightly as needed (Dry eyes), Disp: 1 each, Rfl: 0    LANTUS SOLOSTAR 100 UNIT/ML injection pen, Inject 24 Units into the skin 2 times daily, Disp: 43.2 mL, Rfl: 0    CONTOUR TEST strip, Inject 1 each into the skin 2 times daily, Disp: 200 each, Rfl: 3    sevelamer
show

## 2023-06-08 ENCOUNTER — APPOINTMENT (OUTPATIENT)
Dept: RHEUMATOLOGY | Facility: CLINIC | Age: 58
End: 2023-06-08
Payer: MEDICARE

## 2023-06-08 VITALS — SYSTOLIC BLOOD PRESSURE: 152 MMHG | DIASTOLIC BLOOD PRESSURE: 96 MMHG | HEART RATE: 65 BPM | OXYGEN SATURATION: 96 %

## 2023-06-08 VITALS
BODY MASS INDEX: 31.71 KG/M2 | OXYGEN SATURATION: 95 % | TEMPERATURE: 98.3 F | WEIGHT: 247 LBS | DIASTOLIC BLOOD PRESSURE: 86 MMHG | HEART RATE: 74 BPM | SYSTOLIC BLOOD PRESSURE: 148 MMHG

## 2023-06-08 PROCEDURE — 96365 THER/PROPH/DIAG IV INF INIT: CPT

## 2023-06-08 PROCEDURE — 96366 THER/PROPH/DIAG IV INF ADDON: CPT

## 2023-06-08 RX ORDER — ACETAMINOPHEN 325 MG/1
325 TABLET ORAL
Qty: 0 | Refills: 0 | Status: COMPLETED
Start: 2023-04-05

## 2023-06-08 RX ORDER — DIPHENHYDRAMINE HCL 25 MG/1
25 TABLET ORAL
Qty: 0 | Refills: 0 | Status: COMPLETED
Start: 2023-04-05

## 2023-06-08 NOTE — HISTORY OF PRESENT ILLNESS
[N/A] : N/A [Denies] : Denies [No] : No [Yes] : Yes [Right upper extremity] : Right upper extremity [24g] : 24g [Medication Name: ___] : Medication Name: [unfilled] [Start Time: ___] : Medication Start Time: [unfilled] [End Time: ___] : Medication End Time: [unfilled] [IV discontinued. Intact. No signs or symptoms of IV complications noted. Time: ___] : IV discontinued. Intact. No signs or symptoms of IV complications noted. Time: [unfilled] [Patient  instructed to seek medical attention with signs and symptoms of adverse effects] : Patient  instructed to seek medical attention with signs and symptoms of adverse effects [Patient left unit in no acute distress] : Patient left unit in no acute distress [Medications administered as ordered and tolerated well.] : Medications administered as ordered and tolerated well. [de-identified] : staph infection to wound at back [de-identified] : dorsal venous arch  [de-identified] : Patient presents for Gammagard S/D infusion today. Patient reports that he had been hospitalized <ay 6 to June 6 for wound infection to back. Patient admits to receiving IV antibiotics during hospitalization.  Reports performing his own daily wound care, washing wounds with Normal Saline and covering with dressing provided from hospital. Wounds are healing and dry as per patient. Otherwise, patient denies of having any significant changes and denies of having any other symptoms or complaints. Patient pre-medicated as per order. Infusion titrated as per protocol, and tolerated well.

## 2023-06-09 ENCOUNTER — NON-APPOINTMENT (OUTPATIENT)
Age: 58
End: 2023-06-09

## 2023-06-12 ENCOUNTER — NON-APPOINTMENT (OUTPATIENT)
Age: 58
End: 2023-06-12

## 2023-06-14 ENCOUNTER — NON-APPOINTMENT (OUTPATIENT)
Age: 58
End: 2023-06-14

## 2023-06-14 ENCOUNTER — APPOINTMENT (OUTPATIENT)
Dept: ALLERGY | Facility: CLINIC | Age: 58
End: 2023-06-14
Payer: MEDICARE

## 2023-06-14 VITALS
SYSTOLIC BLOOD PRESSURE: 138 MMHG | HEIGHT: 74 IN | OXYGEN SATURATION: 97 % | WEIGHT: 247 LBS | RESPIRATION RATE: 16 BRPM | TEMPERATURE: 97.9 F | HEART RATE: 79 BPM | BODY MASS INDEX: 31.7 KG/M2 | DIASTOLIC BLOOD PRESSURE: 72 MMHG

## 2023-06-14 VITALS — BODY MASS INDEX: 32.23 KG/M2 | WEIGHT: 251 LBS

## 2023-06-14 PROCEDURE — 99214 OFFICE O/P EST MOD 30 MIN: CPT

## 2023-06-14 RX ORDER — FLUTICASONE FUROATE, UMECLIDINIUM BROMIDE AND VILANTEROL TRIFENATATE 100; 62.5; 25 UG/1; UG/1; UG/1
100-62.5-25 POWDER RESPIRATORY (INHALATION)
Refills: 0 | Status: ACTIVE | COMMUNITY

## 2023-06-14 NOTE — HISTORY OF PRESENT ILLNESS
[de-identified] : Patient hospitalized for cellulitis for one month - buttock and leg - he has follow up with ID - he stopped smoking - he lost 50 pounds - he did not like the food in the hospital.   He is on low carbohydrate diet.   He is taking metformin for type 2 diabetes.    Patient had infusion in the hospital over the course of 90 minutes - with no reaction

## 2023-06-14 NOTE — SOCIAL HISTORY
[House] : [unfilled] lives in a house  [Cat] : cat [Single] : single [de-identified] : lives  [FreeTextEntry2] : on disability

## 2023-06-14 NOTE — PHYSICAL EXAM
[Alert] : alert [No Acute Distress] : no acute distress [Normal Voice/Communication] : normal voice communication [No Neck Mass] : no neck mass was observed [No LAD] : no lymphadenopathy [Wheezing] : no wheezing was heard [Normal Rate] : heart rate was normal  [Normal S1, S2] : normal S1 and S2 [No murmur] : no murmur [Regular Rhythm] : with a regular rhythm [Normal Cervical Lymph Nodes] : cervical [No Rash] : no rash [No clubbing] : no clubbing [No Cyanosis] : no cyanosis [No Edema] : no edema [de-identified] : obese  [de-identified] : adentulous  [de-identified] : LE in wrap  [de-identified] : unkempt male

## 2023-06-14 NOTE — ASSESSMENT
[FreeTextEntry1] : CVID - \par \par Continue Gammagard IVIG infusion - patient presently receiving 70 gm every 4 weeks (500 mg/kg) \par Will maintain this dosage for now. \par \par Moderate persistent asthma:\par \par Continue Trelegy 100 QD\par Continue albuterol 2 puffs QID prn \par \par Will obtain IgG level prior to his next infusion and adjust IgG at that time.

## 2023-06-19 RX ORDER — IMMUNE GLOBULIN INTRAVENOUS (HUMAN) 10 G
10 KIT INTRAVENOUS
Qty: 0 | Refills: 0 | Status: COMPLETED | OUTPATIENT
Start: 2023-04-28

## 2023-06-20 RX ORDER — ARIPIPRAZOLE 15 MG/1
300 TABLET ORAL
Qty: 1 | Refills: 0
Start: 2023-06-20

## 2023-06-29 ENCOUNTER — OUTPATIENT (OUTPATIENT)
Dept: OUTPATIENT SERVICES | Facility: HOSPITAL | Age: 58
LOS: 1 days | End: 2023-06-29

## 2023-06-29 ENCOUNTER — APPOINTMENT (OUTPATIENT)
Dept: WOUND CARE | Facility: CLINIC | Age: 58
End: 2023-06-29
Payer: MEDICARE

## 2023-06-29 DIAGNOSIS — K08.199 COMPLETE LOSS OF TEETH DUE TO OTHER SPECIFIED CAUSE, UNSPECIFIED CLASS: Chronic | ICD-10-CM

## 2023-06-29 DIAGNOSIS — J34.2 DEVIATED NASAL SEPTUM: Chronic | ICD-10-CM

## 2023-06-29 PROCEDURE — 99213 OFFICE O/P EST LOW 20 MIN: CPT

## 2023-06-30 NOTE — BH CONSULTATION LIAISON ASSESSMENT NOTE - NSBHMSELANG_PSY_A_CORE
Garcia Catheter Care, Adult    A Garcia catheter is a soft, flexible tube that is placed into the bladder to drain urine. A Garcia catheter may be inserted if:    You leak urine or are not able to control when you urinate (urinary incontinence).  You are not able to urinate when you need to (urinary retention).   You had prostate surgery or surgery on the genitals.  You have certain medical conditions, such as multiple sclerosis, dementia, or a spinal cord injury.    If you are going home with a Garcia catheter in place, follow the instructions below.    TAKING CARE OF THE CATHETER   Wash your hands with soap and water.   Using mild soap and warm water on a clean washcloth:    Clean the area on your body closest to the catheter insertion site using a circular motion, moving away from the catheter. Never wipe toward the catheter because this could sweep bacteria up into the urethra and cause infection.   Remove all traces of soap. Pat the area dry with a clean towel. For males, reposition the foreskin.    Attach the catheter to your leg so there is no tension on the catheter. Use adhesive tape or a leg strap. If you are using adhesive tape, remove any sticky residue left behind by the previous tape you used.   Keep the drainage bag below the level of the bladder, but keep it off the floor.   Check throughout the day to be sure the catheter is working and urine is draining freely. Make sure the tubing does not become kinked.  Do not pull on the catheter or try to remove it. Pulling could damage internal tissues.    TAKING CARE OF THE DRAINAGE BAGS  You will be given two drainage bags to take home. One is a large overnight drainage bag, and the other is a smaller leg bag that fits underneath clothing. You may wear the overnight bag at any time, but you should never wear the smaller leg bag at night. Follow the instructions below for how to empty, change, and clean your drainage bags.    Emptying the Drainage Bag    You must empty your drainage bag when it is ?–½ full or at least 2–3 times a day.     Wash your hands with soap and water.   Keep the drainage bag below your hips, below the level of your bladder. This stops urine from going back into the tubing and into your bladder.    Hold the dirty bag over the toilet or a clean container.   Open the pour spout at the bottom of the bag and empty the urine into the toilet or container. Do not let the pour spout touch the toilet, container, or any other surface. Doing so can place bacteria on the bag, which can cause an infection.   Clean the pour spout with a gauze pad or cotton ball that has rubbing alcohol on it.   Close the pour spout.   Attach the bag to your leg with adhesive tape or a leg strap.   Wash your hands well.    Changing the Drainage Bag    Change your drainage bag once a month or sooner if it starts to smell bad or look dirty. Below are steps to follow when changing the drainage bag.     Wash your hands with soap and water.   Pinch off the rubber catheter so that urine does not spill out.   Disconnect the catheter tube from the drainage tube at the connection valve. Do not let the tubes touch any surface.    Clean the end of the catheter tube with an alcohol wipe. Use a different alcohol wipe to clean the end of the drainage tube.   Connect the catheter tube to the drainage tube of the clean drainage bag.   Attach the new bag to the leg with adhesive tape or a leg strap. Avoid attaching the new bag too tightly.    Wash your hands well.    Cleaning the Drainage Bag     Wash your hands with soap and water.   Wash the bag in warm, soapy water.   Rinse the bag thoroughly with warm water.   Fill the bag with a solution of white vinegar and water (1 cup vinegar to 1 qt warm water [.2 L vinegar to 1 L warm water]). Close the bag and soak it for 30 minutes in the solution.    Rinse the bag with warm water.    Hang the bag to dry with the pour spout open and hanging downward.   Store the clean bag (once it is dry) in a clean plastic bag.   Wash your hands well.     PREVENTING INFECTION  Wash your hands before and after handling your catheter.  Take showers daily and wash the area where the catheter enters your body. Do not take baths. Replace wet leg straps with dry ones, if this applies.  Do not use powders, sprays, or lotions on the genital area. Only use creams, lotions, or ointments as directed by your caregiver.  For females, wipe from front to back after each bowel movement.   Drink enough fluids to keep your urine clear or pale yellow unless you have a fluid restriction.   Do not let the drainage bag or tubing touch or lie on the floor.   Wear cotton underwear to absorb moisture and to keep your .    SEEK MEDICAL CARE IF:  Your urine is cloudy or smells unusually bad.  Your catheter becomes clogged.  You are not draining urine into the bag or your bladder feels full.  Your catheter starts to leak.    SEEK IMMEDIATE MEDICAL CARE IF:  You have pain, swelling, redness, or pus where the catheter enters the body.  You have pain in the abdomen, legs, lower back, or bladder.  You have a fever.  You see blood fill the catheter, or your urine is pink or red.  You have nausea, vomiting, or chills.  Your catheter gets pulled out.    MAKE SURE YOU:  Understand these instructions.  Will watch your condition.  Will get help right away if you are not doing well or get worse.    ADDITIONAL NOTES AND INSTRUCTIONS    Please follow up with your Primary MD in 24-48 hr.  Seek immediate medical care for any new/worsening signs or symptoms.
No abnormalities noted

## 2023-07-05 ENCOUNTER — APPOINTMENT (OUTPATIENT)
Dept: INFECTIOUS DISEASE | Facility: CLINIC | Age: 58
End: 2023-07-05

## 2023-07-06 ENCOUNTER — APPOINTMENT (OUTPATIENT)
Dept: RHEUMATOLOGY | Facility: CLINIC | Age: 58
End: 2023-07-06
Payer: MEDICARE

## 2023-07-06 VITALS — HEART RATE: 62 BPM | DIASTOLIC BLOOD PRESSURE: 88 MMHG | SYSTOLIC BLOOD PRESSURE: 151 MMHG

## 2023-07-06 VITALS
SYSTOLIC BLOOD PRESSURE: 117 MMHG | WEIGHT: 250 LBS | TEMPERATURE: 98.1 F | HEART RATE: 70 BPM | DIASTOLIC BLOOD PRESSURE: 76 MMHG | BODY MASS INDEX: 32.1 KG/M2 | OXYGEN SATURATION: 95 %

## 2023-07-06 PROCEDURE — 96365 THER/PROPH/DIAG IV INF INIT: CPT

## 2023-07-06 PROCEDURE — 96366 THER/PROPH/DIAG IV INF ADDON: CPT

## 2023-07-06 PROCEDURE — 36415 COLL VENOUS BLD VENIPUNCTURE: CPT

## 2023-07-06 RX ORDER — IMMUNE GLOBULIN INTRAVENOUS (HUMAN) 10 G
10 KIT INTRAVENOUS
Qty: 0 | Refills: 0 | Status: COMPLETED | OUTPATIENT
Start: 2023-06-23

## 2023-07-06 RX ORDER — DIPHENHYDRAMINE HCL 25 MG/1
25 TABLET ORAL
Qty: 0 | Refills: 0 | Status: COMPLETED
Start: 2023-04-05

## 2023-07-06 RX ORDER — ACETAMINOPHEN 325 MG/1
325 TABLET ORAL
Qty: 0 | Refills: 0 | Status: COMPLETED
Start: 2023-04-05

## 2023-07-06 NOTE — HISTORY OF PRESENT ILLNESS
[FreeTextEntry1] : 57 year old male with schizoaffective disorder, HTN, T2DM (A1C 6.3), obesity, chronic bronchiectasis, and CVID on Gammagard S/D 70 grams per month (last infusion 4/13) presenting with R gluteal wound and erythema with no  drainage,s/p hospitalization for MRSA bacteremia in the setting of right gluteal cellulitis,  He was  on vancomycin for 1 month. presents with Left leg ulcer.\par \par

## 2023-07-06 NOTE — DATA REVIEWED
[FreeTextEntry1] : 06-Jun-2023 \par Admission Date	06-May-2023 22:55 \par Reason for Admission	MRSA bacteremia \par Hospital Course	 \par 57M schizoaffective d/o, CVID, bronchiectasis, HTN, DM, previous LLE \par cellulitis/abscess and MRSA bacteremia, recent admit OSH ([facility]) for R buttock \par wound w/ cellulitis though left AMA on clindamycin, a/w worsened sx (CT e/o \par cellulitis w/o abscess) s/p debridement of necrotic skin/fat (5/10) w/ Cx+ MRSA \par (and MRSA bacteremia) initiated on vancomyin (ID c/s) course to end tentatively \par 6/5, course c/b hypoNa that improved with 1.2L fluid restriction and salt tabs. \par BH titrating psychiatric regimen and initially rec'd transfer to [facility] after \par

## 2023-07-06 NOTE — PHYSICAL EXAM
[Normal Breath Sounds] : Normal breath sounds [Skin Ulcer] : ulcer [Calm] : calm [Please See PDF for [employer]] : Please See PDF for [employer]. [de-identified] : NAD [de-identified] : supple [de-identified] : equal chest rise

## 2023-07-06 NOTE — ASSESSMENT
[FreeTextEntry1] : Wound Assessment and Plan:\par 57M schizoaffective d/o, CVID, bronchiectasis, HTN, DM, previous LLE \par cellulitis/abscess and MRSA bacteremia, recent admit OSH ([facility]) for R buttock \par wound w/ cellulitis, s/p debridement, now healing well\par The patient presents with a healed wound to the right gluteus, and open left leg ulcer -\par No clinical sign of infection\par Recommendation:\par Apply lidocaine or topical anesthetic if needed to reduce pain upon washing the wound.\par Wash wound with ---- Dove skin sensitive soap and clean water \par Apply ---- foam on left leg\par Cavilon on right gluteus\par Leg elevation as tolerated\par Encouraged ambulation or exercise.\par Optimization of nutrition.\par Offloading to the wound site.\par \par Supplies ordered\par Return in 3 weeks

## 2023-07-06 NOTE — HISTORY OF PRESENT ILLNESS
[N/A] : N/A [Denies] : Denies [No] : No [Yes] : Yes [Left upper extremity] : Left upper extremity [24g] : 24g [Medication Name: ___] : Medication Name: [unfilled] [Start Time: ___] : Medication Start Time: [unfilled] [End Time: ___] : Medication End Time: [unfilled] [IV discontinued. Intact. No signs or symptoms of IV complications noted. Time: ___] : IV discontinued. Intact. No signs or symptoms of IV complications noted. Time: [unfilled] [Patient  instructed to seek medical attention with signs and symptoms of adverse effects] : Patient  instructed to seek medical attention with signs and symptoms of adverse effects [Patient left unit in no acute distress] : Patient left unit in no acute distress [Medications administered as ordered and tolerated well.] : Medications administered as ordered and tolerated well. [de-identified] : staph infection to wound at back [Blood drawn at time of visit] : Blood drawn at time of visit [de-identified] : dorsal venous arch  [de-identified] : Patient presents for [facility] S/D infusion today. Patient  denies recent hospitalizations. Reports performing his own daily wound care, washing wounds with Normal Saline and covering with dressing provided from hospital. Wounds to back is healed as per patient. Patient continues to dress wound to LLE. Otherwise, patient denies of having any significant changes and denies of having any other symptoms or complaints. Patient pre-medicated as per order. Infusion titrated as per protocol, and tolerated well.

## 2023-07-09 LAB — IGG SER QL IEP: 661 MG/DL

## 2023-07-11 ENCOUNTER — APPOINTMENT (OUTPATIENT)
Dept: INFECTIOUS DISEASE | Facility: CLINIC | Age: 58
End: 2023-07-11
Payer: MEDICARE

## 2023-07-11 VITALS
RESPIRATION RATE: 16 BRPM | BODY MASS INDEX: 31.7 KG/M2 | OXYGEN SATURATION: 96 % | HEART RATE: 69 BPM | WEIGHT: 247 LBS | HEIGHT: 74 IN | DIASTOLIC BLOOD PRESSURE: 84 MMHG | SYSTOLIC BLOOD PRESSURE: 139 MMHG | TEMPERATURE: 97.7 F

## 2023-07-11 PROCEDURE — 99214 OFFICE O/P EST MOD 30 MIN: CPT

## 2023-07-11 PROCEDURE — 99204 OFFICE O/P NEW MOD 45 MIN: CPT

## 2023-07-11 RX ORDER — CHLORHEXIDINE GLUCONATE 213 G/1000ML
4 SOLUTION TOPICAL
Qty: 2 | Refills: 0 | Status: ACTIVE | COMMUNITY
Start: 2023-07-11 | End: 1900-01-01

## 2023-07-11 NOTE — PHYSICAL EXAM
[General Appearance - Alert] : alert [General Appearance - In No Acute Distress] : in no acute distress [Extraocular Movements] : extraocular movements were intact [Neck Appearance] : the appearance of the neck was normal [Auscultation Breath Sounds / Voice Sounds] : lungs were clear to auscultation bilaterally [Heart Rate And Rhythm] : heart rate was normal and rhythm regular [Heart Sounds] : normal S1 and S2 [Edema] : there was no peripheral edema [Bowel Sounds] : normal bowel sounds [Abdomen Soft] : soft [Musculoskeletal - Swelling] : no joint swelling [] : no rash [Oriented To Time, Place, And Person] : oriented to person, place, and time

## 2023-07-11 NOTE — HISTORY OF PRESENT ILLNESS
[FreeTextEntry1] : \par 57 M PMH bipolar, schizophrenia, HTN, bronchiectasis, CVID on monthly IVIG, L gluteal wound complicated by MRSA bacteremia in March 2023 s/p Vancomycin course through 4/11/23, recent admission at [facility] 5/6/23-6/623 for R gluteal abscess complicated by MRSA bacteremia s/p Vancomycin IV through 6/5/23, who presented for a post hospitalization follow up. \par \par Went to [facility] for R buttock wound and cellulitis initially but signed out AMA\par Was given Clindamycin however presented to [facility] for worsening pain/edema\par Admitted at [facility] 5/6/23 to 6/6/23 for MRSA bacteremia from R gluteal abscess\par + Blood cx 5/6, cleared 5/8\par TTE (5/8) unable to exclude endocarditis \par R gluteal abscess, wound cx with MRSA\par S/p I&D 5/10 (necrotic dermis and subcutaneous fatty tissue)\par Treated with Vancomycin through 6/5 (4 weeks from negative blood cultures) \par \par Sees [facility] for monthly IVIG infusions\par Saw Wound Care on 6/29 - Well healed wound R gluteus, and open L leg ulcer with no signs of infection \par \par Now presents here for follow up

## 2023-07-11 NOTE — ASSESSMENT
[FreeTextEntry1] : 57 M PMH bipolar, schizophrenia, HTN, bronchiectasis, CVID on monthly IVIG, L gluteal wound complicated by MRSA bacteremia in March 2023 s/p Vancomycin course through 4/11/23, recent admission at [facility] 5/6/23-6/623 for R gluteal abscess complicated by MRSA bacteremia s/p Vancomycin IV through 6/5/23, who presented for a post hospitalization follow up. \par \par #MRSA bacteremia, s/p Vancomycin IV\par -Pt denies any fevers or chills\par -No SOB, no BEL\par -Continue Wound Care follow up\par -Hibiclens provided to use 2-3 times/week\par \par #Underarm folliculitis\par B/l underarm folliculitis\par Bactrim 5 day course\par Warm soaks \par \par RTC as needed

## 2023-07-13 ENCOUNTER — NON-APPOINTMENT (OUTPATIENT)
Age: 58
End: 2023-07-13

## 2023-07-14 ENCOUNTER — INPATIENT (INPATIENT)
Facility: HOSPITAL | Age: 58
LOS: 6 days | Discharge: HOME | End: 2023-07-21
Attending: HOSPITALIST | Admitting: HOSPITALIST
Payer: MEDICARE

## 2023-07-14 VITALS
DIASTOLIC BLOOD PRESSURE: 61 MMHG | SYSTOLIC BLOOD PRESSURE: 117 MMHG | RESPIRATION RATE: 18 BRPM | OXYGEN SATURATION: 100 % | HEART RATE: 69 BPM | TEMPERATURE: 98 F

## 2023-07-14 DIAGNOSIS — K08.199 COMPLETE LOSS OF TEETH DUE TO OTHER SPECIFIED CAUSE, UNSPECIFIED CLASS: Chronic | ICD-10-CM

## 2023-07-14 DIAGNOSIS — J34.2 DEVIATED NASAL SEPTUM: Chronic | ICD-10-CM

## 2023-07-14 PROCEDURE — 99285 EMERGENCY DEPT VISIT HI MDM: CPT | Mod: FS

## 2023-07-14 RX ORDER — SODIUM CHLORIDE 9 MG/ML
1000 INJECTION INTRAMUSCULAR; INTRAVENOUS; SUBCUTANEOUS ONCE
Refills: 0 | Status: COMPLETED | OUTPATIENT
Start: 2023-07-14 | End: 2023-07-14

## 2023-07-14 RX ORDER — KETOROLAC TROMETHAMINE 30 MG/ML
15 SYRINGE (ML) INJECTION ONCE
Refills: 0 | Status: DISCONTINUED | OUTPATIENT
Start: 2023-07-14 | End: 2023-07-14

## 2023-07-14 RX ADMIN — Medication 15 MILLIGRAM(S): at 23:40

## 2023-07-14 RX ADMIN — SODIUM CHLORIDE 1000 MILLILITER(S): 9 INJECTION INTRAMUSCULAR; INTRAVENOUS; SUBCUTANEOUS at 23:40

## 2023-07-14 NOTE — ED PROVIDER NOTE - OBJECTIVE STATEMENT
58 Y/O M PMH Bipolar Disorder Schizophrenia Asthma COPD daily Smoker P/W pain to his groin for the past 4 days. Pt states he feels like an egg is around his scrotum. Pt states he was formerly diabetic but has since been diet controlled. Pt denies h/o heavy lifting, denies fever chills or nightsweats, denies any other sx or acute complaints.

## 2023-07-14 NOTE — ED ADULT NURSE NOTE - NSFALLUNIVINTERV_ED_ALL_ED
Bed/Stretcher in lowest position, wheels locked, appropriate side rails in place/Call bell, personal items and telephone in reach/Instruct patient to call for assistance before getting out of bed/chair/stretcher/Non-slip footwear applied when patient is off stretcher/[place] to call system/Physically safe environment - no spills, clutter or unnecessary equipment/Purposeful proactive rounding/Room/bathroom lighting operational, light cord in reach

## 2023-07-14 NOTE — ED PROVIDER NOTE - PROGRESS NOTE DETAILS
GERMAN Evans: Discussed case with both Urology and General Surgery who reviewed the pt's imaging, both feel there is no drainable collection. On exam I also did not appreciate a drainable collection. Will admit to medicine for further management of cellulitis and hyponatremia.

## 2023-07-14 NOTE — ED ADULT NURSE NOTE - OBJECTIVE STATEMENT
56 y/o M presents to ED intake A&Ox4 c/o "boil." upon assessment, MD believes area to be inguinal hernia. pt denies pain to area at this time. denies dysuria, hematuria, fevers, c/p, SOB. no acute distress noted. respirations even and unlabored. PMHx Bipolar, HTN, DM, Recent MRSA infection to buttocks and L leg. . 20G to LAC< labs drawn and sent. medicated as per MD orders. awaiting CT and US. safety maintained

## 2023-07-14 NOTE — ED PROVIDER NOTE - PHYSICAL EXAMINATION
: GERMAN Evans chaperoned by MD Thibodeaux. + Scrotal fullness and + R inguinal induration possibly reflecting a direct hernia.

## 2023-07-14 NOTE — ED PROVIDER NOTE - CLINICAL SUMMARY MEDICAL DECISION MAKING FREE TEXT BOX
58 Y/O M PMH Bipolar Disorder Schizophrenia Asthma COPD daily Smoker P/W pain to his groin for the past 4 days. Pt states he feels like an egg is around his scrotum. Plan is U/S to eval for testicular mass, UA and culture to eval for a UTI, CT to eval for hernia. Will check labs to eval for anemia or electrolyte disturbance. Toradol ordered for pain. Pt is systemically well, no acute distress.

## 2023-07-14 NOTE — ED ADULT TRIAGE NOTE - CHIEF COMPLAINT QUOTE
c/o boil to right inner thigh x 4 days. "it is the size of a small egg". Denies fever and drainage from boil. Denies Hx: HTN, DM, bipolar and schizophrenia, calm and cooperative.

## 2023-07-14 NOTE — ED PROVIDER NOTE - ATTENDING APP SHARED VISIT CONTRIBUTION OF CARE
57M h/o Bipolar, Schizophrenia, COPD p/w groin pain x4 days. Reports pain and swelling with a "bubble inside" his scrotum. Denies heavy lifting, abdominal pain, dysuria/hematuria, fever/chills. Having normal bowel movements. No penile discharge.  Gen: nad  CV: rrr, no appreciable mumur  Pulm: clear lungs  Abd: soft, ntnd   (performed with PA Valane): + Scrotal fullness and + R inguinal induration without tenderness  MDM: 57M h/o Bipolar, Schizophrenia, COPD p/w groin pain x4 days concerning for inguinal hernia vs orchitis vs epididymitis. Low suspicion for torsion based on reported symptoms and exam. Will get CT ab/pel to assess for hernia, US testicle to assess for acute testicular pathology, UA/UC, basic labs. Pt reports prior DM but now diet controlled and exam not c/w Lynn's

## 2023-07-15 DIAGNOSIS — J44.9 CHRONIC OBSTRUCTIVE PULMONARY DISEASE, UNSPECIFIED: ICD-10-CM

## 2023-07-15 DIAGNOSIS — F31.9 BIPOLAR DISORDER, UNSPECIFIED: ICD-10-CM

## 2023-07-15 DIAGNOSIS — E11.9 TYPE 2 DIABETES MELLITUS WITHOUT COMPLICATIONS: ICD-10-CM

## 2023-07-15 DIAGNOSIS — I10 ESSENTIAL (PRIMARY) HYPERTENSION: ICD-10-CM

## 2023-07-15 DIAGNOSIS — L03.314 CELLULITIS OF GROIN: ICD-10-CM

## 2023-07-15 DIAGNOSIS — E03.9 HYPOTHYROIDISM, UNSPECIFIED: ICD-10-CM

## 2023-07-15 DIAGNOSIS — E87.1 HYPO-OSMOLALITY AND HYPONATREMIA: ICD-10-CM

## 2023-07-15 DIAGNOSIS — F17.200 NICOTINE DEPENDENCE, UNSPECIFIED, UNCOMPLICATED: ICD-10-CM

## 2023-07-15 DIAGNOSIS — D83.9 COMMON VARIABLE IMMUNODEFICIENCY, UNSPECIFIED: ICD-10-CM

## 2023-07-15 DIAGNOSIS — N50.89 OTHER SPECIFIED DISORDERS OF THE MALE GENITAL ORGANS: ICD-10-CM

## 2023-07-15 DIAGNOSIS — E78.5 HYPERLIPIDEMIA, UNSPECIFIED: ICD-10-CM

## 2023-07-15 DIAGNOSIS — L02.215 CUTANEOUS ABSCESS OF PERINEUM: ICD-10-CM

## 2023-07-15 LAB
A1C WITH ESTIMATED AVERAGE GLUCOSE RESULT: 5.2 % — SIGNIFICANT CHANGE UP (ref 4–5.6)
ALBUMIN SERPL ELPH-MCNC: 3.8 G/DL — SIGNIFICANT CHANGE UP (ref 3.3–5)
ALBUMIN SERPL ELPH-MCNC: 3.9 G/DL — SIGNIFICANT CHANGE UP (ref 3.3–5)
ALP SERPL-CCNC: 115 U/L — SIGNIFICANT CHANGE UP (ref 40–120)
ALP SERPL-CCNC: 119 U/L — SIGNIFICANT CHANGE UP (ref 40–120)
ALT FLD-CCNC: 15 U/L — SIGNIFICANT CHANGE UP (ref 4–41)
ALT FLD-CCNC: 18 U/L — SIGNIFICANT CHANGE UP (ref 4–41)
ANION GAP SERPL CALC-SCNC: 11 MMOL/L — SIGNIFICANT CHANGE UP (ref 7–14)
ANION GAP SERPL CALC-SCNC: 12 MMOL/L — SIGNIFICANT CHANGE UP (ref 7–14)
APPEARANCE UR: CLEAR — SIGNIFICANT CHANGE UP
AST SERPL-CCNC: 17 U/L — SIGNIFICANT CHANGE UP (ref 4–40)
AST SERPL-CCNC: 27 U/L — SIGNIFICANT CHANGE UP (ref 4–40)
BACTERIA # UR AUTO: NEGATIVE /HPF — SIGNIFICANT CHANGE UP
BASOPHILS # BLD AUTO: 0.02 K/UL — SIGNIFICANT CHANGE UP (ref 0–0.2)
BASOPHILS # BLD AUTO: 0.02 K/UL — SIGNIFICANT CHANGE UP (ref 0–0.2)
BASOPHILS NFR BLD AUTO: 0.2 % — SIGNIFICANT CHANGE UP (ref 0–2)
BASOPHILS NFR BLD AUTO: 0.3 % — SIGNIFICANT CHANGE UP (ref 0–2)
BILIRUB SERPL-MCNC: 0.3 MG/DL — SIGNIFICANT CHANGE UP (ref 0.2–1.2)
BILIRUB SERPL-MCNC: <0.2 MG/DL — SIGNIFICANT CHANGE UP (ref 0.2–1.2)
BILIRUB UR-MCNC: NEGATIVE — SIGNIFICANT CHANGE UP
BLD GP AB SCN SERPL QL: NEGATIVE — SIGNIFICANT CHANGE UP
BLOOD GAS VENOUS COMPREHENSIVE RESULT: SIGNIFICANT CHANGE UP
BUN SERPL-MCNC: 10 MG/DL — SIGNIFICANT CHANGE UP (ref 7–23)
BUN SERPL-MCNC: 11 MG/DL — SIGNIFICANT CHANGE UP (ref 7–23)
CALCIUM SERPL-MCNC: 9 MG/DL — SIGNIFICANT CHANGE UP (ref 8.4–10.5)
CALCIUM SERPL-MCNC: 9.3 MG/DL — SIGNIFICANT CHANGE UP (ref 8.4–10.5)
CAST: 0 /LPF — SIGNIFICANT CHANGE UP (ref 0–4)
CHLORIDE SERPL-SCNC: 90 MMOL/L — LOW (ref 98–107)
CHLORIDE SERPL-SCNC: 95 MMOL/L — LOW (ref 98–107)
CO2 SERPL-SCNC: 22 MMOL/L — SIGNIFICANT CHANGE UP (ref 22–31)
CO2 SERPL-SCNC: 23 MMOL/L — SIGNIFICANT CHANGE UP (ref 22–31)
COLOR SPEC: YELLOW — SIGNIFICANT CHANGE UP
CREAT SERPL-MCNC: 0.81 MG/DL — SIGNIFICANT CHANGE UP (ref 0.5–1.3)
CREAT SERPL-MCNC: 0.94 MG/DL — SIGNIFICANT CHANGE UP (ref 0.5–1.3)
DIFF PNL FLD: NEGATIVE — SIGNIFICANT CHANGE UP
EGFR: 104 ML/MIN/1.73M2 — SIGNIFICANT CHANGE UP
EGFR: 96 ML/MIN/1.73M2 — SIGNIFICANT CHANGE UP
EOSINOPHIL # BLD AUTO: 0.19 K/UL — SIGNIFICANT CHANGE UP (ref 0–0.5)
EOSINOPHIL # BLD AUTO: 0.2 K/UL — SIGNIFICANT CHANGE UP (ref 0–0.5)
EOSINOPHIL NFR BLD AUTO: 2.1 % — SIGNIFICANT CHANGE UP (ref 0–6)
EOSINOPHIL NFR BLD AUTO: 3.1 % — SIGNIFICANT CHANGE UP (ref 0–6)
ESTIMATED AVERAGE GLUCOSE: 103 — SIGNIFICANT CHANGE UP
GLUCOSE BLDC GLUCOMTR-MCNC: 108 MG/DL — HIGH (ref 70–99)
GLUCOSE SERPL-MCNC: 88 MG/DL — SIGNIFICANT CHANGE UP (ref 70–99)
GLUCOSE SERPL-MCNC: 88 MG/DL — SIGNIFICANT CHANGE UP (ref 70–99)
GLUCOSE UR QL: NEGATIVE MG/DL — SIGNIFICANT CHANGE UP
HCT VFR BLD CALC: 35.5 % — LOW (ref 39–50)
HCT VFR BLD CALC: 35.8 % — LOW (ref 39–50)
HGB BLD-MCNC: 11.7 G/DL — LOW (ref 13–17)
HGB BLD-MCNC: 11.8 G/DL — LOW (ref 13–17)
IANC: 4.66 K/UL — SIGNIFICANT CHANGE UP (ref 1.8–7.4)
IANC: 6.38 K/UL — SIGNIFICANT CHANGE UP (ref 1.8–7.4)
IMM GRANULOCYTES NFR BLD AUTO: 0.2 % — SIGNIFICANT CHANGE UP (ref 0–0.9)
IMM GRANULOCYTES NFR BLD AUTO: 0.3 % — SIGNIFICANT CHANGE UP (ref 0–0.9)
KETONES UR-MCNC: NEGATIVE MG/DL — SIGNIFICANT CHANGE UP
LEUKOCYTE ESTERASE UR-ACNC: NEGATIVE — SIGNIFICANT CHANGE UP
LYMPHOCYTES # BLD AUTO: 0.78 K/UL — LOW (ref 1–3.3)
LYMPHOCYTES # BLD AUTO: 1.33 K/UL — SIGNIFICANT CHANGE UP (ref 1–3.3)
LYMPHOCYTES # BLD AUTO: 12.2 % — LOW (ref 13–44)
LYMPHOCYTES # BLD AUTO: 14.8 % — SIGNIFICANT CHANGE UP (ref 13–44)
MAGNESIUM SERPL-MCNC: 2.1 MG/DL — SIGNIFICANT CHANGE UP (ref 1.6–2.6)
MCHC RBC-ENTMCNC: 27.5 PG — SIGNIFICANT CHANGE UP (ref 27–34)
MCHC RBC-ENTMCNC: 27.6 PG — SIGNIFICANT CHANGE UP (ref 27–34)
MCHC RBC-ENTMCNC: 32.7 GM/DL — SIGNIFICANT CHANGE UP (ref 32–36)
MCHC RBC-ENTMCNC: 33.2 GM/DL — SIGNIFICANT CHANGE UP (ref 32–36)
MCV RBC AUTO: 82.8 FL — SIGNIFICANT CHANGE UP (ref 80–100)
MCV RBC AUTO: 84.4 FL — SIGNIFICANT CHANGE UP (ref 80–100)
MONOCYTES # BLD AUTO: 0.71 K/UL — SIGNIFICANT CHANGE UP (ref 0–0.9)
MONOCYTES # BLD AUTO: 1.01 K/UL — HIGH (ref 0–0.9)
MONOCYTES NFR BLD AUTO: 11.1 % — SIGNIFICANT CHANGE UP (ref 2–14)
MONOCYTES NFR BLD AUTO: 11.3 % — SIGNIFICANT CHANGE UP (ref 2–14)
NEUTROPHILS # BLD AUTO: 4.66 K/UL — SIGNIFICANT CHANGE UP (ref 1.8–7.4)
NEUTROPHILS # BLD AUTO: 6.38 K/UL — SIGNIFICANT CHANGE UP (ref 1.8–7.4)
NEUTROPHILS NFR BLD AUTO: 71.3 % — SIGNIFICANT CHANGE UP (ref 43–77)
NEUTROPHILS NFR BLD AUTO: 73.1 % — SIGNIFICANT CHANGE UP (ref 43–77)
NITRITE UR-MCNC: NEGATIVE — SIGNIFICANT CHANGE UP
NRBC # BLD: 0 /100 WBCS — SIGNIFICANT CHANGE UP (ref 0–0)
NRBC # BLD: 0 /100 WBCS — SIGNIFICANT CHANGE UP (ref 0–0)
NRBC # FLD: 0 K/UL — SIGNIFICANT CHANGE UP (ref 0–0)
NRBC # FLD: 0 K/UL — SIGNIFICANT CHANGE UP (ref 0–0)
PH UR: 7 — SIGNIFICANT CHANGE UP (ref 5–8)
PHOSPHATE SERPL-MCNC: 3.9 MG/DL — SIGNIFICANT CHANGE UP (ref 2.5–4.5)
PLATELET # BLD AUTO: 195 K/UL — SIGNIFICANT CHANGE UP (ref 150–400)
PLATELET # BLD AUTO: 208 K/UL — SIGNIFICANT CHANGE UP (ref 150–400)
POTASSIUM SERPL-MCNC: 4.4 MMOL/L — SIGNIFICANT CHANGE UP (ref 3.5–5.3)
POTASSIUM SERPL-MCNC: 4.5 MMOL/L — SIGNIFICANT CHANGE UP (ref 3.5–5.3)
POTASSIUM SERPL-SCNC: 4.4 MMOL/L — SIGNIFICANT CHANGE UP (ref 3.5–5.3)
POTASSIUM SERPL-SCNC: 4.5 MMOL/L — SIGNIFICANT CHANGE UP (ref 3.5–5.3)
PROT SERPL-MCNC: 6.5 G/DL — SIGNIFICANT CHANGE UP (ref 6–8.3)
PROT SERPL-MCNC: 7.1 G/DL — SIGNIFICANT CHANGE UP (ref 6–8.3)
PROT UR-MCNC: NEGATIVE MG/DL — SIGNIFICANT CHANGE UP
RBC # BLD: 4.24 M/UL — SIGNIFICANT CHANGE UP (ref 4.2–5.8)
RBC # BLD: 4.29 M/UL — SIGNIFICANT CHANGE UP (ref 4.2–5.8)
RBC # FLD: 14.9 % — HIGH (ref 10.3–14.5)
RBC # FLD: 15 % — HIGH (ref 10.3–14.5)
RBC CASTS # UR COMP ASSIST: 0 /HPF — SIGNIFICANT CHANGE UP (ref 0–4)
RH IG SCN BLD-IMP: POSITIVE — SIGNIFICANT CHANGE UP
SODIUM SERPL-SCNC: 123 MMOL/L — LOW (ref 135–145)
SODIUM SERPL-SCNC: 130 MMOL/L — LOW (ref 135–145)
SP GR SPEC: 1 — SIGNIFICANT CHANGE UP (ref 1–1.03)
SQUAMOUS # UR AUTO: 0 /HPF — SIGNIFICANT CHANGE UP (ref 0–5)
UROBILINOGEN FLD QL: 0.2 MG/DL — SIGNIFICANT CHANGE UP (ref 0.2–1)
WBC # BLD: 6.38 K/UL — SIGNIFICANT CHANGE UP (ref 3.8–10.5)
WBC # BLD: 8.96 K/UL — SIGNIFICANT CHANGE UP (ref 3.8–10.5)
WBC # FLD AUTO: 6.38 K/UL — SIGNIFICANT CHANGE UP (ref 3.8–10.5)
WBC # FLD AUTO: 8.96 K/UL — SIGNIFICANT CHANGE UP (ref 3.8–10.5)
WBC UR QL: 0 /HPF — SIGNIFICANT CHANGE UP (ref 0–5)

## 2023-07-15 PROCEDURE — 74177 CT ABD & PELVIS W/CONTRAST: CPT | Mod: 26,MA

## 2023-07-15 PROCEDURE — 99233 SBSQ HOSP IP/OBS HIGH 50: CPT

## 2023-07-15 PROCEDURE — 76870 US EXAM SCROTUM: CPT | Mod: 26

## 2023-07-15 RX ORDER — GLUCAGON INJECTION, SOLUTION 0.5 MG/.1ML
1 INJECTION, SOLUTION SUBCUTANEOUS ONCE
Refills: 0 | Status: DISCONTINUED | OUTPATIENT
Start: 2023-07-15 | End: 2023-07-21

## 2023-07-15 RX ORDER — VANCOMYCIN HCL 1 G
VIAL (EA) INTRAVENOUS
Refills: 0 | Status: DISCONTINUED | OUTPATIENT
Start: 2023-07-15 | End: 2023-07-17

## 2023-07-15 RX ORDER — INSULIN LISPRO 100/ML
VIAL (ML) SUBCUTANEOUS
Refills: 0 | Status: DISCONTINUED | OUTPATIENT
Start: 2023-07-15 | End: 2023-07-21

## 2023-07-15 RX ORDER — DEXTROSE 50 % IN WATER 50 %
12.5 SYRINGE (ML) INTRAVENOUS ONCE
Refills: 0 | Status: DISCONTINUED | OUTPATIENT
Start: 2023-07-15 | End: 2023-07-21

## 2023-07-15 RX ORDER — ATORVASTATIN CALCIUM 80 MG/1
40 TABLET, FILM COATED ORAL AT BEDTIME
Refills: 0 | Status: DISCONTINUED | OUTPATIENT
Start: 2023-07-15 | End: 2023-07-21

## 2023-07-15 RX ORDER — INSULIN LISPRO 100/ML
VIAL (ML) SUBCUTANEOUS AT BEDTIME
Refills: 0 | Status: DISCONTINUED | OUTPATIENT
Start: 2023-07-15 | End: 2023-07-21

## 2023-07-15 RX ORDER — SODIUM CHLORIDE 9 MG/ML
1000 INJECTION, SOLUTION INTRAVENOUS
Refills: 0 | Status: DISCONTINUED | OUTPATIENT
Start: 2023-07-15 | End: 2023-07-21

## 2023-07-15 RX ORDER — LEVOTHYROXINE SODIUM 125 MCG
50 TABLET ORAL DAILY
Refills: 0 | Status: DISCONTINUED | OUTPATIENT
Start: 2023-07-15 | End: 2023-07-21

## 2023-07-15 RX ORDER — VANCOMYCIN HCL 1 G
1000 VIAL (EA) INTRAVENOUS ONCE
Refills: 0 | Status: COMPLETED | OUTPATIENT
Start: 2023-07-15 | End: 2023-07-15

## 2023-07-15 RX ORDER — DEXTROSE 50 % IN WATER 50 %
25 SYRINGE (ML) INTRAVENOUS ONCE
Refills: 0 | Status: DISCONTINUED | OUTPATIENT
Start: 2023-07-15 | End: 2023-07-21

## 2023-07-15 RX ORDER — IRBESARTAN 75 MG/1
1 TABLET ORAL
Refills: 0 | DISCHARGE

## 2023-07-15 RX ORDER — ENOXAPARIN SODIUM 100 MG/ML
40 INJECTION SUBCUTANEOUS EVERY 24 HOURS
Refills: 0 | Status: DISCONTINUED | OUTPATIENT
Start: 2023-07-16 | End: 2023-07-21

## 2023-07-15 RX ORDER — ALBUTEROL 90 UG/1
2 AEROSOL, METERED ORAL EVERY 6 HOURS
Refills: 0 | Status: DISCONTINUED | OUTPATIENT
Start: 2023-07-15 | End: 2023-07-21

## 2023-07-15 RX ORDER — DILTIAZEM HCL 120 MG
300 CAPSULE, EXT RELEASE 24 HR ORAL DAILY
Refills: 0 | Status: DISCONTINUED | OUTPATIENT
Start: 2023-07-15 | End: 2023-07-21

## 2023-07-15 RX ORDER — TIOTROPIUM BROMIDE 18 UG/1
2 CAPSULE ORAL; RESPIRATORY (INHALATION) DAILY
Refills: 0 | Status: DISCONTINUED | OUTPATIENT
Start: 2023-07-15 | End: 2023-07-21

## 2023-07-15 RX ORDER — VANCOMYCIN HCL 1 G
1000 VIAL (EA) INTRAVENOUS EVERY 12 HOURS
Refills: 0 | Status: DISCONTINUED | OUTPATIENT
Start: 2023-07-16 | End: 2023-07-17

## 2023-07-15 RX ORDER — ACETAMINOPHEN 500 MG
650 TABLET ORAL EVERY 6 HOURS
Refills: 0 | Status: DISCONTINUED | OUTPATIENT
Start: 2023-07-15 | End: 2023-07-21

## 2023-07-15 RX ORDER — DEXTROSE 50 % IN WATER 50 %
15 SYRINGE (ML) INTRAVENOUS ONCE
Refills: 0 | Status: DISCONTINUED | OUTPATIENT
Start: 2023-07-15 | End: 2023-07-21

## 2023-07-15 RX ORDER — BUDESONIDE AND FORMOTEROL FUMARATE DIHYDRATE 160; 4.5 UG/1; UG/1
2 AEROSOL RESPIRATORY (INHALATION)
Refills: 0 | Status: DISCONTINUED | OUTPATIENT
Start: 2023-07-15 | End: 2023-07-21

## 2023-07-15 RX ORDER — LANOLIN ALCOHOL/MO/W.PET/CERES
3 CREAM (GRAM) TOPICAL AT BEDTIME
Refills: 0 | Status: DISCONTINUED | OUTPATIENT
Start: 2023-07-15 | End: 2023-07-21

## 2023-07-15 RX ADMIN — ATORVASTATIN CALCIUM 40 MILLIGRAM(S): 80 TABLET, FILM COATED ORAL at 21:50

## 2023-07-15 RX ADMIN — Medication 250 MILLIGRAM(S): at 07:44

## 2023-07-15 RX ADMIN — Medication 50 MICROGRAM(S): at 21:54

## 2023-07-15 RX ADMIN — Medication 100 MILLIGRAM(S): at 06:07

## 2023-07-15 RX ADMIN — Medication 250 MILLIGRAM(S): at 21:50

## 2023-07-15 NOTE — H&P ADULT - PROBLEM SELECTOR PLAN 8
Hold all PO meds     Start on ISS  Monitor FSG and adjust insulin coverage accordingly   CC diet   HgA1c in am

## 2023-07-15 NOTE — H&P ADULT - PROBLEM SELECTOR PLAN 6
CT A/P showed: Diffuse scrotal edema right greater than left    will consult Urology for further recs CT A/P showed: Diffuse scrotal edema right greater than left    Discussed with Urology and recommend Scrotal support and no acute intervention at this time states currently not on any medications, however, he was started on Abilify during last hospitalization    BH consult in am for medication   Currently calm and cooperative  Continue to monitor

## 2023-07-15 NOTE — H&P ADULT - HISTORY OF PRESENT ILLNESS
HPI:    57yMale PMHx Bipolar disorder,  Schizophrenia, COPD presented to ED with groin pain for 4 days.     PAST MEDICAL & SURGICAL HISTORY:  Smoker      DM (diabetes mellitus)      HTN (hypertension)      Abscess of finger      Bipolar disorder      Chronic hyponatremia      CVID (common variable immunodeficiency)      Hyponatremia      Deviated septum      Loss of teeth due to extraction  All teeth due to dental carries          Review of Systems:   CONSTITUTIONAL: No fever, weight loss, or fatigue  EYES: No eye pain, visual disturbances, or discharge  ENMT:  No difficulty hearing, tinnitus, vertigo; No sinus or throat pain  NECK: No pain or stiffness  BREASTS: No pain, masses, or nipple discharge  RESPIRATORY: No cough, wheezing, chills or hemoptysis; No shortness of breath  CARDIOVASCULAR: No chest pain, palpitations, dizziness, or leg swelling  GASTROINTESTINAL: No abdominal or epigastric pain. No nausea, vomiting, or hematemesis; No diarrhea or constipation. No melena or hematochezia.  GENITOURINARY: No dysuria, frequency, hematuria, or incontinence  NEUROLOGICAL: No headaches, memory loss, loss of strength, numbness, or tremors  SKIN: No itching, burning, rashes, or lesions   LYMPH NODES: No enlarged glands  ENDOCRINE: No heat or cold intolerance; No hair loss  MUSCULOSKELETAL: No joint pain or swelling; No muscle, back, or extremity pain  PSYCHIATRIC: No depression, anxiety, mood swings, or difficulty sleeping  HEME/LYMPH: No easy bruising, or bleeding gums  ALLERGY AND IMMUNOLOGIC: No hives or eczema    Allergies    penicillin (Rash)  amoxicillin (Fever)    Intolerances        Social History:     FAMILY HISTORY:  Family history of throat cancer (Father)    Family history of leukemia (Mother)        MEDICATIONS  (STANDING):  clindamycin IVPB 900 milliGRAM(s) IV Intermittent every 8 hours    MEDICATIONS  (PRN):      T(C): 36.7 (07-15-23 @ 10:00), Max: 36.8 (23 @ 21:42)  HR: 65 (07-15-23 @ 10:00) (60 - 69)  BP: 146/72 (07-15-23 @ 10:00) (117/61 - 146/72)  RR: 18 (07-15-23 @ 10:00) (17 - 18)  SpO2: 99% (07-15-23 @ 10:00) (97% - 100%)  Height (cm): 188 (07-15-23 @ 10:00)  Weight (kg): 99.6 (07-15-23 @ 10:00)  BMI (kg/m2): 28.2 (07-15-23 @ 10:00)  BSA (m2): 2.26 (07-15-23 @ 10:00)  CAPILLARY BLOOD GLUCOSE      POCT Blood Glucose.: 108 mg/dL (15 Jul 2023 09:18)  POCT Blood Glucose.: 96 mg/dL (2023 21:48)    I&O's Summary      PHYSICAL EXAM:  GENERAL: NAD, well-developed  HEAD:  Atraumatic, Normocephalic  EYES: EOMI, PERRLA, conjunctiva and sclera clear  NECK: Supple, No elevated JVD  CHEST/LUNG: Clear to auscultation bilaterally; No wheeze  HEART: Regular rate and rhythm; No murmurs, rubs, or gallops  ABDOMEN: Soft, Nontender, Nondistended; Bowel sounds present  EXTREMITIES:  2+ Peripheral Pulses, No clubbing, cyanosis, or edema  PSYCH: AAOx3  NEUROLOGY: CN II-XII grossly intact, moving all extremities  SKIN: No rashes or lesions    LABS:                        11.8   8.96  )-----------( 208      ( 2023 23:42 )             35.5     07-14    123<L>  |  90<L>  |  11  ----------------------------<  88  4.4   |  22  |  0.94    Ca    9.3      2023 23:42    TPro  7.1  /  Alb  3.9  /  TBili  0.3  /  DBili  x   /  AST  27  /  ALT  18  /  AlkPhos  119  07-14          Urinalysis Basic - ( 2023 23:47 )    Color: Yellow / Appearance: Clear / S.005 / pH: x  Gluc: x / Ketone: Negative mg/dL  / Bili: Negative / Urobili: 0.2 mg/dL   Blood: x / Protein: Negative mg/dL / Nitrite: Negative   Leuk Esterase: Negative / RBC: 0 /HPF / WBC 0 /HPF   Sq Epi: x / Non Sq Epi: 0 /HPF / Bacteria: Negative /HPF          RADIOLOGY & ADDITIONAL TESTS:    ECG Personally Reviewed -     Imaging Personally Reviewed:    Consultant(s) Notes Reviewed:      Care Discussed with Consultants/Other Providers:   HPI:    56 yo male pt with PMHx Schizoaffective D/O (Bipolar Type), CVID/ Hypogammaglobulinemia (monthly IVIG - last 2023), HTN, T2DM (on metformin), prior history of left gluteal wound requiring debridement in 2023 , COPD, hx of MRSA presented to ED with groin pain for 5 days. Patient states groin pain and swelling started 5 days ago and have progressively worsened. Patient states he had multiple "boils' in the axillary region and was started on bactrim which he finished on .   Patient states he also used Hibiclens on Friday prior to ED arrival. Patient states groin pain worsened over the last few days and radiating to abdomen which prompted him to come to ED.           PAST MEDICAL & SURGICAL HISTORY:  Smoker      DM (diabetes mellitus)      HTN (hypertension)      Abscess of finger      Bipolar disorder      Chronic hyponatremia      CVID (common variable immunodeficiency)      Hyponatremia      Deviated septum      Loss of teeth due to extraction  All teeth due to dental carries          Review of Systems:   CONSTITUTIONAL: No fever, weight loss, or fatigue  EYES: No eye pain, visual disturbances, or discharge  ENMT:  No difficulty hearing, tinnitus, vertigo; No sinus or throat pain  NECK: No pain or stiffness  BREASTS: No pain, masses, or nipple discharge  RESPIRATORY: No cough, wheezing, chills or hemoptysis; No shortness of breath  CARDIOVASCULAR: No chest pain, palpitations, dizziness, or leg swelling  GASTROINTESTINAL: No abdominal or epigastric pain. No nausea, vomiting, or hematemesis; No diarrhea or constipation. No melena or hematochezia.  GENITOURINARY: No dysuria, frequency, hematuria, or incontinence  NEUROLOGICAL: No headaches, memory loss, loss of strength, numbness, or tremors  SKIN: No itching, burning, rashes, or lesions   LYMPH NODES: No enlarged glands  ENDOCRINE: No heat or cold intolerance; No hair loss  MUSCULOSKELETAL: No joint pain or swelling; No muscle, back, or extremity pain  PSYCHIATRIC: No depression, anxiety, mood swings, or difficulty sleeping  HEME/LYMPH: No easy bruising, or bleeding gums  ALLERGY AND IMMUNOLOGIC: No hives or eczema    Allergies    penicillin (Rash)  amoxicillin (Fever)    Intolerances        Social History:     FAMILY HISTORY:  Family history of throat cancer (Father)    Family history of leukemia (Mother)        MEDICATIONS  (STANDING):  clindamycin IVPB 900 milliGRAM(s) IV Intermittent every 8 hours    MEDICATIONS  (PRN):      T(C): 36.7 (07-15-23 @ 10:00), Max: 36.8 (23 @ 21:42)  HR: 65 (07-15-23 @ 10:00) (60 - 69)  BP: 146/72 (07-15-23 @ 10:00) (117/61 - 146/72)  RR: 18 (07-15-23 @ 10:00) (17 - 18)  SpO2: 99% (07-15-23 @ 10:00) (97% - 100%)  Height (cm): 188 (07-15-23 @ 10:00)  Weight (kg): 99.6 (07-15-23 @ 10:00)  BMI (kg/m2): 28.2 (07-15-23 @ 10:00)  BSA (m2): 2.26 (07-15-23 @ 10:00)  CAPILLARY BLOOD GLUCOSE      POCT Blood Glucose.: 108 mg/dL (15 Jul 2023 09:18)  POCT Blood Glucose.: 96 mg/dL (2023 21:48)    I&O's Summary      PHYSICAL EXAM:  GENERAL: NAD, well-developed  HEAD:  Atraumatic, Normocephalic  EYES: EOMI, PERRLA, conjunctiva and sclera clear  NECK: Supple, No elevated JVD  CHEST/LUNG: Clear to auscultation bilaterally; No wheeze  HEART: Regular rate and rhythm; No murmurs, rubs, or gallops  ABDOMEN: Soft, Nontender, Nondistended; Bowel sounds present  EXTREMITIES:  2+ Peripheral Pulses, No clubbing, cyanosis, or edema  PSYCH: AAOx3  NEUROLOGY: CN II-XII grossly intact, moving all extremities  SKIN: No rashes or lesions    LABS:                        11.8   8.96  )-----------( 208      ( 2023 23:42 )             35.5     07-14    123<L>  |  90<L>  |  11  ----------------------------<  88  4.4   |  22  |  0.94    Ca    9.3      2023 23:42    TPro  7.1  /  Alb  3.9  /  TBili  0.3  /  DBili  x   /  AST  27  /  ALT  18  /  AlkPhos  119  07-14          Urinalysis Basic - ( 2023 23:47 )    Color: Yellow / Appearance: Clear / S.005 / pH: x  Gluc: x / Ketone: Negative mg/dL  / Bili: Negative / Urobili: 0.2 mg/dL   Blood: x / Protein: Negative mg/dL / Nitrite: Negative   Leuk Esterase: Negative / RBC: 0 /HPF / WBC 0 /HPF   Sq Epi: x / Non Sq Epi: 0 /HPF / Bacteria: Negative /HPF          RADIOLOGY & ADDITIONAL TESTS:    ECG Personally Reviewed -     Imaging Personally Reviewed:    Consultant(s) Notes Reviewed:      Care Discussed with Consultants/Other Providers:   HPI:    56 yo male pt with PMHx Schizoaffective D/O (Bipolar Type), CVID/ Hypogammaglobulinemia (monthly IVIG - last 2023), HTN, T2DM (on metformin), prior history of left gluteal wound requiring debridement in 2023 , COPD, hx of MRSA presented to ED with groin pain for 5 days. Patient states groin pain and swelling started 5 days ago and have progressively worsened. Patient states he had multiple "boils' in the axillary region and was started on bactrim which he finished on .   Patient states he also used Hibiclens on Friday prior to ED arrival. Patient states groin pain worsened over the last few days and radiating to abdomen which prompted him to come to ED.         PAST MEDICAL & SURGICAL HISTORY:  Smoker      DM (diabetes mellitus)      HTN (hypertension)      Abscess of finger      Bipolar disorder      Chronic hyponatremia      CVID (common variable immunodeficiency)      Hyponatremia      Deviated septum      Loss of teeth due to extraction  All teeth due to dental carries          Review of Systems:   CONSTITUTIONAL: No fever, weight loss, or fatigue  EYES: No eye pain, visual disturbances, or discharge  ENMT:  No difficulty hearing, tinnitus, vertigo; No sinus or throat pain  NECK: No pain or stiffness  RESPIRATORY: No cough, wheezing, chills or hemoptysis; No shortness of breath  CARDIOVASCULAR: No chest pain, palpitations, dizziness, or leg swelling  GASTROINTESTINAL: No abdominal or epigastric pain. No nausea, vomiting, or hematemesis; No diarrhea or constipation. No melena or hematochezia.  GENITOURINARY: +groin pain,  No dysuria, frequency, hematuria, or incontinence  NEUROLOGICAL: No headaches, memory loss, loss of strength, numbness, or tremors  SKIN + groin swelling and redness in the area   ENDOCRINE: No heat or cold intolerance; No hair loss  MUSCULOSKELETAL: No joint pain or swelling; No muscle, back, or extremity pain  PSYCHIATRIC: No depression, anxiety, mood swings, or difficulty sleeping  HEME/LYMPH: No easy bruising, or bleeding gums  ALLERGY AND IMMUNOLOGIC: No hives or eczema    Allergies    penicillin (Rash)  amoxicillin (Fever)    Intolerances        Social History:   Pt states he smokes 5 cig/day for the last 30 years and has no plans to quit smoking. Denies alcohol use or illict drug use. Lives at home alone and currently unemployed,     FAMILY HISTORY:  Family history of throat cancer (Father)    Family history of leukemia (Mother)        MEDICATIONS  (STANDING):  dextrose 5%. 1000 milliLiter(s) (100 mL/Hr) IV Continuous <Continuous>  dextrose 5%. 1000 milliLiter(s) (50 mL/Hr) IV Continuous <Continuous>  dextrose 50% Injectable 25 Gram(s) IV Push once  dextrose 50% Injectable 25 Gram(s) IV Push once  dextrose 50% Injectable 12.5 Gram(s) IV Push once  glucagon  Injectable 1 milliGRAM(s) IntraMuscular once  insulin lispro (ADMELOG) corrective regimen sliding scale   SubCutaneous three times a day before meals  insulin lispro (ADMELOG) corrective regimen sliding scale   SubCutaneous at bedtime  vancomycin  IVPB 1000 milliGRAM(s) IV Intermittent once  vancomycin  IVPB        MEDICATIONS  (PRN):  dextrose Oral Gel 15 Gram(s) Oral once PRN Blood Glucose LESS THAN 70 milliGRAM(s)/deciliter        T(C): 36.7 (07-15-23 @ 10:00), Max: 36.8 (23 @ 21:42)  HR: 65 (07-15-23 @ 10:00) (60 - 69)  BP: 146/72 (07-15-23 @ 10:00) (117/61 - 146/72)  RR: 18 (07-15-23 @ 10:00) (17 - 18)  SpO2: 99% (07-15-23 @ 10:00) (97% - 100%)  Height (cm): 188 (07-15-23 @ 10:00)  Weight (kg): 99.6 (07-15-23 @ 10:00)  BMI (kg/m2): 28.2 (07-15-23 @ 10:00)  BSA (m2): 2.26 (07-15-23 @ 10:00)  CAPILLARY BLOOD GLUCOSE      POCT Blood Glucose.: 108 mg/dL (15 Jul 2023 09:18)  POCT Blood Glucose.: 96 mg/dL (2023 21:48)    I&O's Summary      PHYSICAL EXAM:  GENERAL: NAD, pt resting in ebd   HEAD:  Atraumatic, Normocephalic  EYES: EOMI, PERRLA, conjunctiva and sclera clear  NECK: Supple, No elevated JVD  CHEST/LUNG: Clear to auscultation bilaterally; No wheeze  HEART: Regular rate and rhythm; No murmurs, rubs, or gallops  ABDOMEN: Soft, Nontender, Nondistended; Bowel sounds present  EXTREMITIES:  2+ Peripheral Pulses, No clubbing, cyanosis, or edema  PSYCH: AAOx3, calm and cooperative   NEUROLOGY: CN II-XII grossly intact, moving all extremities  SKIN: + scrotal swelling with surrounding erythema and inguinal induration  noted, nontender to palpation (examine with RN Yvonne Choi)      LABS:                        11.8   8.96  )-----------( 208      ( 2023 23:42 )             35.5     07-14    123<L>  |  90<L>  |  11  ----------------------------<  88  4.4   |  22  |  0.94    Ca    9.3      2023 23:42    TPro  7.1  /  Alb  3.9  /  TBili  0.3  /  DBili  x   /  AST  27  /  ALT  18  /  AlkPhos  119  07-14          Urinalysis Basic - ( 2023 23:47 )    Color: Yellow / Appearance: Clear / S.005 / pH: x  Gluc: x / Ketone: Negative mg/dL  / Bili: Negative / Urobili: 0.2 mg/dL   Blood: x / Protein: Negative mg/dL / Nitrite: Negative   Leuk Esterase: Negative / RBC: 0 /HPF / WBC 0 /HPF   Sq Epi: x / Non Sq Epi: 0 /HPF / Bacteria: Negative /HPF          RADIOLOGY & ADDITIONAL TESTS:    ECG Personally Reviewed -     Imaging Personally Reviewed:    Consultant(s) Notes Reviewed:      Care Discussed with Consultants/Other Providers:   HPI:    56 yo male pt with PMHx Schizoaffective D/O (Bipolar Type), CVID/ Hypogammaglobulinemia (monthly IVIG - last 2023), HTN, T2DM (on metformin), prior history of left gluteal wound requiring debridement in 2023 , COPD, hx of MRSA presented to ED with groin pain for 5 days. Patient states groin pain and swelling started 5 days ago and have progressively worsened. Patient states he had multiple "boils' in the axillary region and was started on bactrim which he finished on .   Patient states he also used Hibiclens on Friday prior to ED arrival. Patient states groin pain worsened over the last few days and radiating to abdomen which prompted him to come to ED.         PAST MEDICAL & SURGICAL HISTORY:  Smoker      DM (diabetes mellitus)      HTN (hypertension)      Abscess of finger      Bipolar disorder      Chronic hyponatremia      CVID (common variable immunodeficiency)      Hyponatremia      Deviated septum      Loss of teeth due to extraction  All teeth due to dental carries          Review of Systems:   CONSTITUTIONAL: No fever, weight loss, or fatigue  EYES: No eye pain, visual disturbances, or discharge  ENMT:  No difficulty hearing, tinnitus, vertigo; No sinus or throat pain  NECK: No pain or stiffness  RESPIRATORY: No cough, wheezing, chills or hemoptysis; No shortness of breath  CARDIOVASCULAR: No chest pain, palpitations, dizziness, or leg swelling  GASTROINTESTINAL: No abdominal or epigastric pain. No nausea, vomiting, or hematemesis; No diarrhea or constipation. No melena or hematochezia.  GENITOURINARY: +groin pain,  No dysuria, frequency, hematuria, or incontinence  NEUROLOGICAL: No headaches, memory loss, loss of strength, numbness, or tremors  SKIN + groin swelling and redness in the area   ENDOCRINE: No heat or cold intolerance; No hair loss  MUSCULOSKELETAL: No joint pain or swelling; No muscle, back, or extremity pain  PSYCHIATRIC: No depression, anxiety, mood swings, or difficulty sleeping  HEME/LYMPH: No easy bruising, or bleeding gums  ALLERGY AND IMMUNOLOGIC: No hives or eczema    Allergies    penicillin (Rash)  amoxicillin (Fever)    Intolerances        Social History:   Pt states he smokes 5 cig/day for the last 30 years and has no plans to quit smoking. Denies alcohol use or illict drug use. Lives at home alone and currently unemployed,     FAMILY HISTORY:  Family history of throat cancer (Father)    Family history of leukemia (Mother)        MEDICATIONS  (STANDING):  dextrose 5%. 1000 milliLiter(s) (100 mL/Hr) IV Continuous <Continuous>  dextrose 5%. 1000 milliLiter(s) (50 mL/Hr) IV Continuous <Continuous>  dextrose 50% Injectable 25 Gram(s) IV Push once  dextrose 50% Injectable 25 Gram(s) IV Push once  dextrose 50% Injectable 12.5 Gram(s) IV Push once  glucagon  Injectable 1 milliGRAM(s) IntraMuscular once  insulin lispro (ADMELOG) corrective regimen sliding scale   SubCutaneous three times a day before meals  insulin lispro (ADMELOG) corrective regimen sliding scale   SubCutaneous at bedtime  vancomycin  IVPB 1000 milliGRAM(s) IV Intermittent once  vancomycin  IVPB        MEDICATIONS  (PRN):  dextrose Oral Gel 15 Gram(s) Oral once PRN Blood Glucose LESS THAN 70 milliGRAM(s)/deciliter        T(C): 36.7 (07-15-23 @ 10:00), Max: 36.8 (23 @ 21:42)  HR: 65 (07-15-23 @ 10:00) (60 - 69)  BP: 146/72 (07-15-23 @ 10:00) (117/61 - 146/72)  RR: 18 (07-15-23 @ 10:00) (17 - 18)  SpO2: 99% (07-15-23 @ 10:00) (97% - 100%)  Height (cm): 188 (07-15-23 @ 10:00)  Weight (kg): 99.6 (07-15-23 @ 10:00)  BMI (kg/m2): 28.2 (07-15-23 @ 10:00)  BSA (m2): 2.26 (07-15-23 @ 10:00)  CAPILLARY BLOOD GLUCOSE      POCT Blood Glucose.: 108 mg/dL (15 Jul 2023 09:18)  POCT Blood Glucose.: 96 mg/dL (2023 21:48)    I&O's Summary      PHYSICAL EXAM:  GENERAL: NAD, pt resting in ebd   HEAD:  Atraumatic, Normocephalic  EYES: EOMI, PERRLA, conjunctiva and sclera clear  NECK: Supple, No elevated JVD  CHEST/LUNG: Clear to auscultation bilaterally; No wheeze  HEART: Regular rate and rhythm; No murmurs, rubs, or gallops  ABDOMEN: Soft, Nontender, Nondistended; Bowel sounds present  EXTREMITIES:  2+ Peripheral Pulses, No clubbing, cyanosis, or edema  PSYCH: AAOx3, calm and cooperative   NEUROLOGY: CN II-XII grossly intact, moving all extremities  SKIN: + scrotal swelling with surrounding erythema and inguinal induration  noted, nontender to palpation (examine with RN Yvonne Choi)      LABS:                        11.8   8.96  )-----------( 208      ( 2023 23:42 )             35.5     07-14    123<L>  |  90<L>  |  11  ----------------------------<  88  4.4   |  22  |  0.94    Ca    9.3      2023 23:42    TPro  7.1  /  Alb  3.9  /  TBili  0.3  /  DBili  x   /  AST  27  /  ALT  18  /  AlkPhos  119  07-14          Urinalysis Basic - ( 2023 23:47 )    Color: Yellow / Appearance: Clear / S.005 / pH: x  Gluc: x / Ketone: Negative mg/dL  / Bili: Negative / Urobili: 0.2 mg/dL   Blood: x / Protein: Negative mg/dL / Nitrite: Negative   Leuk Esterase: Negative / RBC: 0 /HPF / WBC 0 /HPF   Sq Epi: x / Non Sq Epi: 0 /HPF / Bacteria: Negative /HPF          RADIOLOGY & ADDITIONAL TESTS:    ECG not Reviewed - still pending, ordered again     Imaging Personally Reviewed:    Consultant(s) Notes Reviewed:      Care Discussed with Consultants/Other Providers:

## 2023-07-15 NOTE — H&P ADULT - PROBLEM SELECTOR PLAN 5
Hold all PO meds     Start on ISS  Monitor FSG and adjust insulin coverage accordingly   CC diet   HgA1c in am hx of CVID and currently on  Gammagard IVIG infusion. Currently on 70 gm every 4 weeks (500 mg/kg)   Last dose 7/6/2023     Follows up with Dr.Mitchell Boxer outpt   Continue to monitor

## 2023-07-15 NOTE — H&P ADULT - PROBLEM SELECTOR PLAN 4
Continue home medications CT A/P showed: Diffuse scrotal edema right greater than left    Discussed with Urology and recommend Scrotal support and no acute intervention at this time

## 2023-07-15 NOTE — H&P ADULT - PROBLEM SELECTOR PLAN 7
Currently smokes 5cig/day for last 30 yrs   states he has no plans to quit smoking     smoking cessation  Nicotine patch

## 2023-07-15 NOTE — H&P ADULT - PROBLEM SELECTOR PLAN 2
on admission, Na 123     repeat BMP stat  urine studies   Nephro in am on admission, Na 123   pt states he has hx of hypoNa and on 1L fluid restriction but has not been following it   likely cause polydipsia and in the past, Na has improved with fluid restriction     repeat BMP stat  if Na worsening, would consult Nephrology overnight   cautious about overcorrection   urine studies  fluid restriction to 1L for now

## 2023-07-15 NOTE — CONSULT NOTE ADULT - ASSESSMENT
57yMale PMHx Bipolar disorder,  Schizophrenia, COPD, CVID on immunoglobulin therapy monthly present with R perineal/scrotal fluid collection c/f cellulitis vs. abscess.     Plan:  - No general surgery intervention  - Defer to urology for any necessary I&D   - General surgery to s/o, reconsult PRN     Plan discussed with Dr. Hi Harden PGY-3  B team surgery   [id number]

## 2023-07-15 NOTE — H&P ADULT - ASSESSMENT
56 yo male patient with PMHx Bipolar, Schizophrenia, COPD presented to ED with groin pain x4 days. Admitted for further eval.  58 yo male pt with PMHx Schizoaffective D/O (Bipolar Type), CVID/ Hypogammaglobulinemia (monthly IVIG - last 7/6/2023), HTN, T2DM (on metformin), prior history of left gluteal wound requiring debridement in 6/2023 , COPD, hx of MRSA presented to ED with groin pain for 5 days. Patient states groin pain and swelling started 5 days ago and have progressively worsened.   Admitted for further eval.

## 2023-07-15 NOTE — H&P ADULT - PROBLEM SELECTOR PLAN 1
CT A/P showed: Diffuse scrotal edema right greater than left. In the superficial perineal region there is a fluid collection measuring approximately 2.5 x 1.5 cm (2-139) with associated adjacent subcutaneous edema and no peripheral enhancement. Mildly enlarged prostate.     admit to medicine   will start on IV abx   will consult Urology and colorectal surgery  wound care pt p/w groin pain that stared 5 days ago and completed Bactrim yesterday (3 day course)\  Previously admitted in 6/2023 for buttock cellulitis and underwent drainage.     CT A/P showed: Diffuse scrotal edema right greater than left. In the superficial perineal region there is a fluid collection measuring approximately 2.5 x 1.5 cm (2-139) with associated adjacent subcutaneous edema and no peripheral enhancement. Mildly enlarged prostate.   s/p 1 dose of IV Vanc and Clinda , no leukocytosis, remains afebrile. does not meet sepsis criteria at this time   lactate 1.0     admit to medicine   Continue IV Vancomycin  Urology consulted and recommend elevating scrotum and no further interventions, no I&D needed at this time.   Gen surgery consulted and no acute interventions needed at this time   Follow up blood cultures  Monitor VS and WBC

## 2023-07-15 NOTE — CONSULT NOTE ADULT - SUBJECTIVE AND OBJECTIVE BOX
GENERAL SURGERY CONSULT NOTE  Consulting surgical team: B team surgery  Consulting attending: Dr. 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     PAST MEDICAL & SURGICAL HISTORY:  Smoker      DM (diabetes mellitus)      HTN (hypertension)      Abscess of finger      Bipolar disorder      Chronic hyponatremia      CVID (common variable immunodeficiency)      Hyponatremia      Deviated septum      Loss of teeth due to extraction  All teeth due to dental carries          Review of Systems:   CONSTITUTIONAL: No fever, weight loss, or fatigue  EYES: No eye pain, visual disturbances, or discharge  ENMT:  No difficulty hearing, tinnitus, vertigo; No sinus or throat pain  NECK: No pain or stiffness  BREASTS: No pain, masses, or nipple discharge  RESPIRATORY: No cough, wheezing, chills or hemoptysis; No shortness of breath  CARDIOVASCULAR: No chest pain, palpitations, dizziness, or leg swelling  GASTROINTESTINAL: No abdominal or epigastric pain. No nausea, vomiting, or hematemesis; No diarrhea or constipation. No melena or hematochezia.  GENITOURINARY: No dysuria, frequency, hematuria, or incontinence  NEUROLOGICAL: No headaches, memory loss, loss of strength, numbness, or tremors  SKIN: No itching, burning, rashes, or lesions   LYMPH NODES: No enlarged glands  ENDOCRINE: No heat or cold intolerance; No hair loss  MUSCULOSKELETAL: No joint pain or swelling; No muscle, back, or extremity pain  PSYCHIATRIC: No depression, anxiety, mood swings, or difficulty sleeping  HEME/LYMPH: No easy bruising, or bleeding gums  ALLERGY AND IMMUNOLOGIC: No hives or eczema    Allergies    penicillin (Rash)  amoxicillin (Fever)    Intolerances        Social History:     FAMILY HISTORY:  Family history of throat cancer (Father)    Family history of leukemia (Mother)        MEDICATIONS  (STANDING):  clindamycin IVPB 900 milliGRAM(s) IV Intermittent every 8 hours    MEDICATIONS  (PRN):      T(C): 36.7 (07-15-23 @ 10:00), Max: 36.8 (23 @ 21:42)  HR: 65 (07-15-23 @ 10:00) (60 - 69)  BP: 146/72 (07-15-23 @ 10:00) (117/61 - 146/72)  RR: 18 (07-15-23 @ 10:00) (17 - 18)  SpO2: 99% (07-15-23 @ 10:00) (97% - 100%)  Height (cm): 188 (07-15-23 @ 10:00)  Weight (kg): 99.6 (07-15-23 @ 10:00)  BMI (kg/m2): 28.2 (07-15-23 @ 10:00)  BSA (m2): 2.26 (07-15-23 @ 10:00)  CAPILLARY BLOOD GLUCOSE      POCT Blood Glucose.: 108 mg/dL (15 Jul 2023 09:18)  POCT Blood Glucose.: 96 mg/dL (2023 21:48)    I&O's Summary        LABS:                        11.8   8.96  )-----------( 208      ( 2023 23:42 )             35.5     07    123<L>  |  90<L>  |  11  ----------------------------<  88  4.4   |  22  |  0.94    Ca    9.3      2023 23:42    TPro  7.1  /  Alb  3.9  /  TBili  0.3  /  DBili  x   /  AST  27  /  ALT  18  /  AlkPhos  119  07-14          Urinalysis Basic - ( 2023 23:47 )    Color: Yellow / Appearance: Clear / S.005 / pH: x  Gluc: x / Ketone: Negative mg/dL  / Bili: Negative / Urobili: 0.2 mg/dL   Blood: x / Protein: Negative mg/dL / Nitrite: Negative   Leuk Esterase: Negative / RBC: 0 /HPF / WBC 0 /HPF   Sq Epi: x / Non Sq Epi: 0 /HPF / Bacteria: Negative /HPF              SOCIAL HISTORY:  - Denies EtOH abuse, , IVDA. Smokes 5 cigarettes a day     MEDICATIONS:  dextrose 5%. 1000 milliLiter(s) IV Continuous <Continuous>  dextrose 5%. 1000 milliLiter(s) IV Continuous <Continuous>  dextrose 50% Injectable 25 Gram(s) IV Push once  dextrose 50% Injectable 12.5 Gram(s) IV Push once  dextrose 50% Injectable 25 Gram(s) IV Push once  dextrose Oral Gel 15 Gram(s) Oral once PRN  glucagon  Injectable 1 milliGRAM(s) IntraMuscular once  insulin lispro (ADMELOG) corrective regimen sliding scale   SubCutaneous three times a day before meals  insulin lispro (ADMELOG) corrective regimen sliding scale   SubCutaneous at bedtime  vancomycin  IVPB 1000 milliGRAM(s) IV Intermittent once  vancomycin  IVPB          ALLERGIES:  penicillin (Rash)  amoxicillin (Fever)      VITALS & I/Os:  Vital Signs Last 24 Hrs  T(C): 36.6 (15 Jul 2023 20:23), Max: 36.8 (2023 21:42)  T(F): 97.9 (15 Jul 2023 20:23), Max: 98.3 (2023 21:42)  HR: 70 (15 Jul 2023 20:23) (60 - 70)  BP: 136/72 (15 Jul 2023 20:23) (117/61 - 146/72)  BP(mean): --  RR: 18 (15 Jul 2023 20:23) (17 - 18)  SpO2: 95% (15 Jul 2023 20:23) (95% - 100%)    Parameters below as of 15 Jul 2023 20:23  Patient On (Oxygen Delivery Method): room air        I&O's Summary      PHYSICAL EXAM:  GEN: resting comfortably in bed, in NAD  RESP: no acute respiratory distress, breathing comfortably   ABD: soft, non-distended, non-tender   : Cellulitis and induration superior and on R scrotum. No crepitus on exam.   NEURO:  no focal neuro deficits     LABS:                        11.8   8.96  )-----------( 208      ( 2023 23:42 )             35.5     07-14    123<L>  |  90<L>  |  11  ----------------------------<  88  4.4   |  22  |  0.94    Ca    9.3      2023 23:42    TPro  7.1  /  Alb  3.9  /  TBili  0.3  /  DBili  x   /  AST  27  /  ALT  18  /  AlkPhos  119  07-14    Lactate:  07-14 @ 23:42  1.0              Urinalysis Basic - ( 2023 23:47 )    Color: Yellow / Appearance: Clear / S.005 / pH: x  Gluc: x / Ketone: Negative mg/dL  / Bili: Negative / Urobili: 0.2 mg/dL   Blood: x / Protein: Negative mg/dL / Nitrite: Negative   Leuk Esterase: Negative / RBC: 0 /HPF / WBC 0 /HPF   Sq Epi: x / Non Sq Epi: 0 /HPF / Bacteria: Negative /HPF        IMAGING:

## 2023-07-15 NOTE — PATIENT PROFILE ADULT - FALL HARM RISK - UNIVERSAL INTERVENTIONS
Bed in lowest position, wheels locked, appropriate side rails in place/Call bell, personal items and telephone in reach/Instruct patient to call for assistance before getting out of bed or chair/Non-slip footwear when patient is out of bed/[place] to call system/Physically safe environment - no spills, clutter or unnecessary equipment/Purposeful Proactive Rounding/Room/bathroom lighting operational, light cord in reach

## 2023-07-16 LAB
ALBUMIN SERPL ELPH-MCNC: 3.7 G/DL — SIGNIFICANT CHANGE UP (ref 3.3–5)
ALP SERPL-CCNC: 119 U/L — SIGNIFICANT CHANGE UP (ref 40–120)
ALT FLD-CCNC: 14 U/L — SIGNIFICANT CHANGE UP (ref 4–41)
ANION GAP SERPL CALC-SCNC: 12 MMOL/L — SIGNIFICANT CHANGE UP (ref 7–14)
ANION GAP SERPL CALC-SCNC: 14 MMOL/L — SIGNIFICANT CHANGE UP (ref 7–14)
AST SERPL-CCNC: 16 U/L — SIGNIFICANT CHANGE UP (ref 4–40)
BASOPHILS # BLD AUTO: 0.02 K/UL — SIGNIFICANT CHANGE UP (ref 0–0.2)
BASOPHILS NFR BLD AUTO: 0.3 % — SIGNIFICANT CHANGE UP (ref 0–2)
BILIRUB SERPL-MCNC: 0.2 MG/DL — SIGNIFICANT CHANGE UP (ref 0.2–1.2)
BUN SERPL-MCNC: 9 MG/DL — SIGNIFICANT CHANGE UP (ref 7–23)
BUN SERPL-MCNC: 9 MG/DL — SIGNIFICANT CHANGE UP (ref 7–23)
CALCIUM SERPL-MCNC: 9.3 MG/DL — SIGNIFICANT CHANGE UP (ref 8.4–10.5)
CALCIUM SERPL-MCNC: 9.4 MG/DL — SIGNIFICANT CHANGE UP (ref 8.4–10.5)
CHLORIDE SERPL-SCNC: 96 MMOL/L — LOW (ref 98–107)
CHLORIDE SERPL-SCNC: 97 MMOL/L — LOW (ref 98–107)
CO2 SERPL-SCNC: 19 MMOL/L — LOW (ref 22–31)
CO2 SERPL-SCNC: 22 MMOL/L — SIGNIFICANT CHANGE UP (ref 22–31)
CREAT ?TM UR-MCNC: 48 MG/DL — SIGNIFICANT CHANGE UP
CREAT SERPL-MCNC: 0.77 MG/DL — SIGNIFICANT CHANGE UP (ref 0.5–1.3)
CREAT SERPL-MCNC: 0.8 MG/DL — SIGNIFICANT CHANGE UP (ref 0.5–1.3)
CULTURE RESULTS: SIGNIFICANT CHANGE UP
EGFR: 105 ML/MIN/1.73M2 — SIGNIFICANT CHANGE UP
EGFR: 106 ML/MIN/1.73M2 — SIGNIFICANT CHANGE UP
EOSINOPHIL # BLD AUTO: 0.21 K/UL — SIGNIFICANT CHANGE UP (ref 0–0.5)
EOSINOPHIL NFR BLD AUTO: 3.2 % — SIGNIFICANT CHANGE UP (ref 0–6)
FERRITIN SERPL-MCNC: 249 NG/ML — SIGNIFICANT CHANGE UP (ref 30–400)
FOLATE SERPL-MCNC: 6.6 NG/ML — SIGNIFICANT CHANGE UP (ref 3.1–17.5)
GLUCOSE BLDC GLUCOMTR-MCNC: 100 MG/DL — HIGH (ref 70–99)
GLUCOSE BLDC GLUCOMTR-MCNC: 101 MG/DL — HIGH (ref 70–99)
GLUCOSE BLDC GLUCOMTR-MCNC: 132 MG/DL — HIGH (ref 70–99)
GLUCOSE BLDC GLUCOMTR-MCNC: 141 MG/DL — HIGH (ref 70–99)
GLUCOSE SERPL-MCNC: 83 MG/DL — SIGNIFICANT CHANGE UP (ref 70–99)
GLUCOSE SERPL-MCNC: 85 MG/DL — SIGNIFICANT CHANGE UP (ref 70–99)
HCT VFR BLD CALC: 37.1 % — LOW (ref 39–50)
HGB BLD-MCNC: 12.1 G/DL — LOW (ref 13–17)
IANC: 4.88 K/UL — SIGNIFICANT CHANGE UP (ref 1.8–7.4)
IMM GRANULOCYTES NFR BLD AUTO: 0.2 % — SIGNIFICANT CHANGE UP (ref 0–0.9)
IRON SATN MFR SERPL: 15 % — SIGNIFICANT CHANGE UP (ref 14–50)
IRON SATN MFR SERPL: 35 UG/DL — LOW (ref 45–165)
LYMPHOCYTES # BLD AUTO: 0.83 K/UL — LOW (ref 1–3.3)
LYMPHOCYTES # BLD AUTO: 12.6 % — LOW (ref 13–44)
MAGNESIUM SERPL-MCNC: 2.1 MG/DL — SIGNIFICANT CHANGE UP (ref 1.6–2.6)
MCHC RBC-ENTMCNC: 26.7 PG — LOW (ref 27–34)
MCHC RBC-ENTMCNC: 32.6 GM/DL — SIGNIFICANT CHANGE UP (ref 32–36)
MCV RBC AUTO: 81.7 FL — SIGNIFICANT CHANGE UP (ref 80–100)
MONOCYTES # BLD AUTO: 0.62 K/UL — SIGNIFICANT CHANGE UP (ref 0–0.9)
MONOCYTES NFR BLD AUTO: 9.4 % — SIGNIFICANT CHANGE UP (ref 2–14)
MRSA PCR RESULT.: DETECTED
NEUTROPHILS # BLD AUTO: 4.88 K/UL — SIGNIFICANT CHANGE UP (ref 1.8–7.4)
NEUTROPHILS NFR BLD AUTO: 74.3 % — SIGNIFICANT CHANGE UP (ref 43–77)
NRBC # BLD: 0 /100 WBCS — SIGNIFICANT CHANGE UP (ref 0–0)
NRBC # FLD: 0 K/UL — SIGNIFICANT CHANGE UP (ref 0–0)
OSMOLALITY UR: 253 MOSM/KG — SIGNIFICANT CHANGE UP (ref 50–1200)
PHOSPHATE SERPL-MCNC: 3.9 MG/DL — SIGNIFICANT CHANGE UP (ref 2.5–4.5)
PLATELET # BLD AUTO: 198 K/UL — SIGNIFICANT CHANGE UP (ref 150–400)
POTASSIUM SERPL-MCNC: 4.3 MMOL/L — SIGNIFICANT CHANGE UP (ref 3.5–5.3)
POTASSIUM SERPL-MCNC: 4.5 MMOL/L — SIGNIFICANT CHANGE UP (ref 3.5–5.3)
POTASSIUM SERPL-SCNC: 4.3 MMOL/L — SIGNIFICANT CHANGE UP (ref 3.5–5.3)
POTASSIUM SERPL-SCNC: 4.5 MMOL/L — SIGNIFICANT CHANGE UP (ref 3.5–5.3)
POTASSIUM UR-SCNC: 14.9 MMOL/L — SIGNIFICANT CHANGE UP
PROT ?TM UR-MCNC: 9 MG/DL — SIGNIFICANT CHANGE UP
PROT SERPL-MCNC: 6.5 G/DL — SIGNIFICANT CHANGE UP (ref 6–8.3)
PROT/CREAT UR-RTO: 0.2 RATIO — SIGNIFICANT CHANGE UP (ref 0–0.2)
RBC # BLD: 4.54 M/UL — SIGNIFICANT CHANGE UP (ref 4.2–5.8)
RBC # FLD: 15 % — HIGH (ref 10.3–14.5)
S AUREUS DNA NOSE QL NAA+PROBE: DETECTED
SODIUM SERPL-SCNC: 130 MMOL/L — LOW (ref 135–145)
SODIUM SERPL-SCNC: 130 MMOL/L — LOW (ref 135–145)
SODIUM UR-SCNC: 52 MMOL/L — SIGNIFICANT CHANGE UP
[facility] SOURCE: SIGNIFICANT CHANGE UP
TIBC SERPL-MCNC: 237 UG/DL — SIGNIFICANT CHANGE UP (ref 220–430)
TSH SERPL-MCNC: 2.06 UIU/ML — SIGNIFICANT CHANGE UP (ref 0.27–4.2)
UIBC SERPL-MCNC: 202 UG/DL — SIGNIFICANT CHANGE UP (ref 110–370)
UUN UR-MCNC: 291.1 MG/DL — SIGNIFICANT CHANGE UP
VIT B12 SERPL-MCNC: 506 PG/ML — SIGNIFICANT CHANGE UP (ref 200–900)
WBC # BLD: 6.57 K/UL — SIGNIFICANT CHANGE UP (ref 3.8–10.5)
WBC # FLD AUTO: 6.57 K/UL — SIGNIFICANT CHANGE UP (ref 3.8–10.5)

## 2023-07-16 PROCEDURE — 99232 SBSQ HOSP IP/OBS MODERATE 35: CPT

## 2023-07-16 RX ORDER — MUPIROCIN 20 MG/G
1 OINTMENT TOPICAL
Refills: 0 | Status: DISCONTINUED | OUTPATIENT
Start: 2023-07-16 | End: 2023-07-21

## 2023-07-16 RX ORDER — CHLORHEXIDINE GLUCONATE 213 G/1000ML
1 SOLUTION TOPICAL DAILY
Refills: 0 | Status: DISCONTINUED | OUTPATIENT
Start: 2023-07-16 | End: 2023-07-21

## 2023-07-16 RX ADMIN — Medication 50 MICROGRAM(S): at 05:33

## 2023-07-16 RX ADMIN — BUDESONIDE AND FORMOTEROL FUMARATE DIHYDRATE 2 PUFF(S): 160; 4.5 AEROSOL RESPIRATORY (INHALATION) at 09:03

## 2023-07-16 RX ADMIN — BUDESONIDE AND FORMOTEROL FUMARATE DIHYDRATE 2 PUFF(S): 160; 4.5 AEROSOL RESPIRATORY (INHALATION) at 21:24

## 2023-07-16 RX ADMIN — CHLORHEXIDINE GLUCONATE 1 APPLICATION(S): 213 SOLUTION TOPICAL at 21:25

## 2023-07-16 RX ADMIN — Medication 250 MILLIGRAM(S): at 05:33

## 2023-07-16 RX ADMIN — Medication 650 MILLIGRAM(S): at 19:51

## 2023-07-16 RX ADMIN — ENOXAPARIN SODIUM 40 MILLIGRAM(S): 100 INJECTION SUBCUTANEOUS at 17:44

## 2023-07-16 RX ADMIN — TIOTROPIUM BROMIDE 2 PUFF(S): 18 CAPSULE ORAL; RESPIRATORY (INHALATION) at 17:43

## 2023-07-16 RX ADMIN — Medication 250 MILLIGRAM(S): at 17:44

## 2023-07-16 RX ADMIN — MUPIROCIN 1 APPLICATION(S): 20 OINTMENT TOPICAL at 21:25

## 2023-07-16 RX ADMIN — ATORVASTATIN CALCIUM 40 MILLIGRAM(S): 80 TABLET, FILM COATED ORAL at 21:24

## 2023-07-16 RX ADMIN — Medication 300 MILLIGRAM(S): at 06:34

## 2023-07-16 NOTE — CONSULT NOTE ADULT - SUBJECTIVE AND OBJECTIVE BOX
HPI    58 yo male pt with PMHx Schizoaffective D/O (Bipolar Type), CVID/ Hypogammaglobulinemia (monthly IVIG - last 7/6/2023), HTN, T2DM (on metformin), prior history of left gluteal wound requiring debridement in 6/2023 , COPD, hx of MRSA presented to ED with groin pain for 5 days. Patient states groin pain and swelling started 5 days ago and have progressively worsened. Patient states he had multiple "boils' in the axillary region and was started on bactrim which he finished on 7/14.   Patient states he also used Hibiclens on Friday prior to ED arrival. Patient states groin pain worsened over the last few days and radiating to abdomen which prompted him to come to ED.     Urology consulted redness and swelling in the scrotal area. Patient with perineal collection small measuring 2.5x1cm on CT and scrotal edema on ultasound and CT. Otherwise patient has not had any scrotal or testicular pain, reports voiding well with no obstructive or irritative urinary symptoms. Patient has had wound in the buttock area previous requiring wound care. Urine culture here has been negative. UA negative. NO gross hematuria, fevers, chills.     AVSS. WBC 6.57  / Hct 37.1  / SCr 0.80. Urine culture negative. CT findings mentioned above consistent with small superficial perineal collection. scrotum unaffected with no air or collection in the scrotal area on ultrasound and CT scan. Currently on IV antibiotics per primary team.       PAST MEDICAL & SURGICAL HISTORY:  Smoker      DM (diabetes mellitus)      HTN (hypertension)      Abscess of finger      Bipolar disorder      Chronic hyponatremia      CVID (common variable immunodeficiency)      Hyponatremia      Smoker      Deviated septum      Loss of teeth due to extraction  All teeth due to dental carries          MEDICATIONS  (STANDING):  atorvastatin 40 milliGRAM(s) Oral at bedtime  budesonide  80 MICROgram(s)/formoterol 4.5 MICROgram(s) Inhaler 2 Puff(s) Inhalation two times a day  dextrose 5%. 1000 milliLiter(s) (100 mL/Hr) IV Continuous <Continuous>  dextrose 5%. 1000 milliLiter(s) (50 mL/Hr) IV Continuous <Continuous>  dextrose 50% Injectable 25 Gram(s) IV Push once  dextrose 50% Injectable 12.5 Gram(s) IV Push once  dextrose 50% Injectable 25 Gram(s) IV Push once  diltiazem    milliGRAM(s) Oral daily  enoxaparin Injectable 40 milliGRAM(s) SubCutaneous every 24 hours  glucagon  Injectable 1 milliGRAM(s) IntraMuscular once  insulin lispro (ADMELOG) corrective regimen sliding scale   SubCutaneous three times a day before meals  insulin lispro (ADMELOG) corrective regimen sliding scale   SubCutaneous at bedtime  levothyroxine 50 MICROGram(s) Oral daily  tiotropium 2.5 MICROgram(s) Inhaler 2 Puff(s) Inhalation daily  vancomycin  IVPB 1000 milliGRAM(s) IV Intermittent every 12 hours  vancomycin  IVPB        MEDICATIONS  (PRN):  acetaminophen     Tablet .. 650 milliGRAM(s) Oral every 6 hours PRN Temp greater or equal to 38C (100.4F), Mild Pain (1 - 3)  albuterol    90 MICROgram(s) HFA Inhaler 2 Puff(s) Inhalation every 6 hours PRN Shortness of Breath and/or Wheezing  dextrose Oral Gel 15 Gram(s) Oral once PRN Blood Glucose LESS THAN 70 milliGRAM(s)/deciliter  melatonin 3 milliGRAM(s) Oral at bedtime PRN Insomnia      FAMILY HISTORY:  Family history of throat cancer (Father)    Family history of leukemia (Mother)        Allergies    penicillin (Rash)  amoxicillin (Fever)    Intolerances        SOCIAL HISTORY:    REVIEW OF SYSTEMS: Otherwise negative as stated in HPI    Physical Exam  Vital signs  T(C): 36.4 (07-16-23 @ 12:10), Max: 36.6 (07-15-23 @ 20:23)  HR: 60 (07-16-23 @ 12:10)  BP: 104/62 (07-16-23 @ 12:10)  SpO2: 98% (07-16-23 @ 12:10)  Wt(kg): --    Output      Gen: awake and alert. NAD.   Pulm: Normal work of breathing on RA  Abd: Soft, nontender, nondistended.   : Scrotum with nonswollen non toxic and nontender testicles. Minimal scrotal wall edema noted. The right aspect of the perineum noted to be swollen. Appears adjacent to the scrotum but not in the scrotum as evidenced by CT scan and ultrasound. appears indurated in that area.       LABS:      07-16 @ 03:00    WBC 6.57  / Hct 37.1  / SCr 0.80     07-15 @ 20:00    WBC 6.38  / Hct 35.8  / SCr 0.81     07-16    130<L>  |  96<L>  |  9   ----------------------------<  85  4.3   |  22  |  0.80    Ca    9.4      16 Jul 2023 03:00  Phos  3.9     07-16  Mg     2.10     07-16    TPro  6.5  /  Alb  3.7  /  TBili  0.2  /  DBili  x   /  AST  16  /  ALT  14  /  AlkPhos  119  07-16      Urinalysis Basic - ( 16 Jul 2023 03:00 )    Color: x / Appearance: x / SG: x / pH: x  Gluc: 85 mg/dL / Ketone: x  / Bili: x / Urobili: x   Blood: x / Protein: x / Nitrite: x   Leuk Esterase: x / RBC: x / WBC x   Sq Epi: x / Non Sq Epi: x / Bacteria: x        Urine Cx: neg    RADIOLOGY:    ult< from: US Testicles (07.15.23 @ 01:39) >  ACC: [id number] EXAM:  US SCROTUM AND CONTENTS   ORDERED BY: VANIA MCKENZIE     PROCEDURE DATE:  07/15/2023          INTERPRETATION:  CLINICAL INFORMATION: Scrotal swelling    COMPARISON: None available.    TECHNIQUE: Testicular ultrasound utilizing color and spectral Doppler.    FINDINGS:    RIGHT:  Right testis: 4.8 cm x 2.1 cm x 3.0 cm. Diffuse scrotal edema.   Microlithiasis. Otherwise normal testicular echogenicity and echotexture   with no masses or areas of architectural distortion. Normal arterial and   venous blood flow pattern.  Right epididymis: Within normal limits.  Right hydrocele: None.  Right varicocele: None.    LEFT:  Left testis: 4.3 cm x 2.0 cm x 3.0 cm. Diffuse scrotal edema.   Microlithiasis. Otherwise normal testicular echogenicity and echotexture   with no masses or areas of architectural distortion. Normal arterial and   venous blood flow pattern.  Left epididymis: Within normal limits.  Left hydrocele: None.  Left varicocele: None.    IMPRESSION:    Diffuse bilateral scrotal edema.    Bilateral microlithiasis. If patient has high risk for malignancy,   consider follow-up ultrasound in one year.    --- End of Report ---          MANNIE ALARCON MD; Resident Radiologist  This document has been electronically signed.  KRISTINE BROOKS MD; Attending Radiologist  This document has been electronically signed. Jul 15 2023  3:49AM    < end of copied text >  < from: CT Abdomen and Pelvis w/ IV Cont (07.15.23 @ 02:59) >    ACC: [id number] EXAM:  CT ABDOMEN AND PELVIS IC   ORDERED BY: VANIA MCKENZIE     PROCEDURE DATE:  07/15/2023          INTERPRETATION:  CLINICAL INFORMATION: Right inguinal and scrotal   tenderness. Rule out hernia or collection.    COMPARISON: CT abdomen pelvis 5/6/2023. CT chest 3/27/2017.    CONTRAST/COMPLICATIONS:  IV Contrast: Omnipaque 350  95 cc cc administered  Oral Contrast: NONE  Complications: None reported at time of study completion    PROCEDURE:  CT of the Abdomen and Pelvis was performed.  Sagittal and coronal reformats were performed.    FINDINGS:  LOWER CHEST: Bronchiectasis in the lung bases again noted, right worse   than left. Pleural thickening of the right posterior pleura.    LIVER: Hepatic cyst.  BILE DUCTS: Normal caliber.  GALLBLADDER: Within normal limits.  SPLEEN: Within normal limits.  PANCREAS: Within normal limits.  ADRENALS: 1.0 cm left adrenal adenoma, stable.  KIDNEYS/URETERS: Right renal cyst.    BLADDER: Within normal limits.  REPRODUCTIVE ORGANS: Diffuse scrotal edema right greater than left. In   the superficial perineal region there is a fluid collection measuring   approximately 2.5 x 1.5 cm (2-139) with associated adjacent subcutaneous   edema and no peripheral enhancement. Mildly enlarged prostate.    BOWEL: No bowel obstruction. Appendix is normal.  PERITONEUM: No ascites.  VESSELS: Atherosclerotic changes.  RETROPERITONEUM/LYMPH NODES: No lymphadenopathy.  ABDOMINAL WALL: Within normal limits.  BONES: Degenerative changes.    IMPRESSION:  Fluid collection in the right superficial perineal region with adjacent   subcutaneous edema.        --- End of Report ---          MANNIE ALARCON MD; Resident Radiologist  This document has been electronically signed.  KRISTINE BROOKS MD; Attending Radiologist    < end of copied text >

## 2023-07-16 NOTE — CONSULT NOTE ADULT - ASSESSMENT
57yMale PMHx Bipolar disorder,  Schizophrenia, COPD, CVID on immunoglobulin therapy monthly presented to ED with R groin pain for 4 days.     AVSS. WBC 6.57  / Hct 37.1  / SCr 0.80.   Urine culture negative.   CT findings mentioned above consistent with small superficial perineal collection. scrotum unaffected with no air or collection in the scrotal area on ultrasound and CT scan.   Currently on IV antibiotics per primary team.     Plan  - Scrotum appears not swollen and nontoxic, slight redness likely from surrounding perineal infection. No fluctuance noted in the perineum, scrotum and scrotal contents not involved as evidenced by CT and ultrasound. No further urological intervention.   - Defer to general surgery for management of perineal collection.  - Continue antibiotics per primary team  - Negative urine culture  - Recommend trending WBC and monitoring for fevers.   - Recommend elevation of scrotum to improve scrotal edema.   - If scrotal exam worsens or infection starts to involve the scrotum, please feel free to contact urology [phone]      [facility]  [address] [place] [phone]

## 2023-07-17 LAB
GLUCOSE BLDC GLUCOMTR-MCNC: 97 MG/DL — SIGNIFICANT CHANGE UP (ref 70–99)
GLUCOSE BLDC GLUCOMTR-MCNC: 99 MG/DL — SIGNIFICANT CHANGE UP (ref 70–99)
VANCOMYCIN TROUGH SERPL-MCNC: 5.2 UG/ML — LOW (ref 10–20)

## 2023-07-17 PROCEDURE — 99232 SBSQ HOSP IP/OBS MODERATE 35: CPT

## 2023-07-17 PROCEDURE — 99223 1ST HOSP IP/OBS HIGH 75: CPT

## 2023-07-17 RX ORDER — VANCOMYCIN HCL 1 G
500 VIAL (EA) INTRAVENOUS ONCE
Refills: 0 | Status: COMPLETED | OUTPATIENT
Start: 2023-07-17 | End: 2023-07-17

## 2023-07-17 RX ORDER — VANCOMYCIN HCL 1 G
1250 VIAL (EA) INTRAVENOUS EVERY 12 HOURS
Refills: 0 | Status: DISCONTINUED | OUTPATIENT
Start: 2023-07-17 | End: 2023-07-21

## 2023-07-17 RX ADMIN — MUPIROCIN 1 APPLICATION(S): 20 OINTMENT TOPICAL at 05:20

## 2023-07-17 RX ADMIN — Medication 50 MICROGRAM(S): at 05:20

## 2023-07-17 RX ADMIN — Medication 250 MILLIGRAM(S): at 05:24

## 2023-07-17 RX ADMIN — ENOXAPARIN SODIUM 40 MILLIGRAM(S): 100 INJECTION SUBCUTANEOUS at 17:39

## 2023-07-17 RX ADMIN — ATORVASTATIN CALCIUM 40 MILLIGRAM(S): 80 TABLET, FILM COATED ORAL at 22:48

## 2023-07-17 RX ADMIN — Medication 300 MILLIGRAM(S): at 05:20

## 2023-07-17 RX ADMIN — Medication 100 MILLIGRAM(S): at 08:42

## 2023-07-17 RX ADMIN — TIOTROPIUM BROMIDE 2 PUFF(S): 18 CAPSULE ORAL; RESPIRATORY (INHALATION) at 08:45

## 2023-07-17 RX ADMIN — CHLORHEXIDINE GLUCONATE 1 APPLICATION(S): 213 SOLUTION TOPICAL at 11:53

## 2023-07-17 RX ADMIN — MUPIROCIN 1 APPLICATION(S): 20 OINTMENT TOPICAL at 17:39

## 2023-07-17 RX ADMIN — BUDESONIDE AND FORMOTEROL FUMARATE DIHYDRATE 2 PUFF(S): 160; 4.5 AEROSOL RESPIRATORY (INHALATION) at 22:48

## 2023-07-17 RX ADMIN — Medication 166.67 MILLIGRAM(S): at 17:40

## 2023-07-17 RX ADMIN — BUDESONIDE AND FORMOTEROL FUMARATE DIHYDRATE 2 PUFF(S): 160; 4.5 AEROSOL RESPIRATORY (INHALATION) at 08:45

## 2023-07-17 NOTE — CONSULT NOTE ADULT - ATTENDING COMMENTS
58 yo male with  PMHx Schizoaffective D/O (Bipolar Type), CVID/ Hypogammaglobulinemia (monthly IVIG - last 7/6/2023), HTN, T2DM (on metformin), prior history of left gluteal wound requiring debridement in 6/2023 , COPD, hx of MRSA presented to ED with groin pain for 5 days. Found to have a localized infection (by CT imaging and exam) with small abscess. No I&D performed prior to exam today.   witch hazel pads for hygiene , pain for groin  alginate absorptive     for arm and hand cover with medihoney/ foam,   arm wound may  drain in next coming days consider ace wrap/tubi compression
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
## 2023-07-17 NOTE — CHART NOTE - NSCHARTNOTEFT_GEN_A_CORE
[facility] NIGHT MEDICINE COVERAGE    AM Vanco trough is 5.2, pt started AM dose of Vancomycin 1g already.  Will give Vancomycin 500mg x1 after AM dose completed.  Recommend increasing Vancomycin to 1250mg q12h per d/w pharmacy after AM dose is completed.  Will endorse to day provider in AM.    Josr Hampton PA-C  Department of Medicine - [facility]  In-House Pager: #[phone]

## 2023-07-17 NOTE — CONSULT NOTE ADULT - SUBJECTIVE AND OBJECTIVE BOX
WOUND SURGERY CONSULT NOTE  58 yo male pt with PMHx Schizoaffective D/O (Bipolar Type), CVID/ Hypogammaglobulinemia (monthly IVIG - last 7/6/2023), HTN, T2DM (on metformin), prior history of left gluteal wound requiring debridement in 6/2023 , COPD, hx of MRSA presented to ED with groin pain for 5 days. Patient states groin pain and swelling started 5 days ago and have progressively worsened. Patient states he had multiple "boils' in the axillary region and was started on bactrim which he finished on 7/14.   Patient states he also used Hibiclens on Friday prior to ED arrival. Patient states groin pain worsened over the last few days and radiating to abdomen which prompted him to come to ED.     Wound consult requested to assist w/ management of perineal wound. Patient has been assessed by both Urology and General Surgery, and no interventions have been recommended.   Patient also reports a recent history of a burn to his R hand second digit (while he was trying to light a cigarette) which he said formed a large blister which eventually popped and drained clear fluid. He denies any difficulty with flexing or extending his digit, and reports no numbness/pareshesia.   In addition, he reports a "boil" which has been present on his R forearm for several days. This lesion is consistent with his prior furuncles/boils, in that it is a raised, reddened and painful area. He denies any fevers/chills, and denies noting any drainage from the area. He denies any pain of his elbow joint, or any pain with RUE movement.       Current Diet: Diet, Consistent Carbohydrate w/Evening Snack:   1000mL Fluid Restriction ([id number]) (07-15-23 @ 20:40)      PAST MEDICAL & SURGICAL HISTORY:  Smoker      DM (diabetes mellitus)      HTN (hypertension)      Abscess of finger      Bipolar disorder      Chronic hyponatremia      CVID (common variable immunodeficiency)      Hyponatremia      Smoker      Deviated septum      Loss of teeth due to extraction  All teeth due to dental carries          REVIEW OF SYSTEMS:   CONSTITUTIONAL:  No fever. No chills. No dizziness.    HEENT:  No pain, erythema, or discharge. No blurring of vision. No sore throat, URI symptoms. No epistaxis. No tinnitus.    CARDIOVASCULAR:  No chest pain. No palpitations. No lower extremity edema.    RESPIRATORY:  No shortness of breath, cough, pain with respiration, pleuritic chest pain. No hemoptysis. No dyspnea. No paroxysmal nocturnal dyspnea.    GASTROINTESTINAL:  Normal appetite. No nausea, vomiting, diarrhea. No pain. No bloating. No melena.    GENITOURINARY:  No frequency, urgency, nocturia. No hematuria or dysuria.    MUSCULOSKELETAL:  No arthralgias or myalgias. No weakness.     INTEGUMENTARY:  Wounds/lesions as described in HPI.     NEUROLOGIC:  No headache. No neck pain. No numbness or tingling of the extremities.    PSYCHIATRIC:  No reported depression or anxiety.     ENDOCRINE:  No history of thyroid, diabetes or adrenal problems.    HEMATOLOGICAL:  No bleeding. No petechiae. No bruising.    MEDICATIONS  (STANDING):  atorvastatin 40 milliGRAM(s) Oral at bedtime  budesonide  80 MICROgram(s)/formoterol 4.5 MICROgram(s) Inhaler 2 Puff(s) Inhalation two times a day  chlorhexidine 2% Cloths 1 Application(s) Topical daily  dextrose 5%. 1000 milliLiter(s) (50 mL/Hr) IV Continuous <Continuous>  dextrose 5%. 1000 milliLiter(s) (100 mL/Hr) IV Continuous <Continuous>  dextrose 50% Injectable 25 Gram(s) IV Push once  dextrose 50% Injectable 25 Gram(s) IV Push once  dextrose 50% Injectable 12.5 Gram(s) IV Push once  diltiazem    milliGRAM(s) Oral daily  enoxaparin Injectable 40 milliGRAM(s) SubCutaneous every 24 hours  glucagon  Injectable 1 milliGRAM(s) IntraMuscular once  insulin lispro (ADMELOG) corrective regimen sliding scale   SubCutaneous three times a day before meals  insulin lispro (ADMELOG) corrective regimen sliding scale   SubCutaneous at bedtime  levothyroxine 50 MICROGram(s) Oral daily  mupirocin 2% Ointment 1 Application(s) Topical two times a day  nicotine - Inhaler 1 Each Inhalation two times a day  tiotropium 2.5 MICROgram(s) Inhaler 2 Puff(s) Inhalation daily  vancomycin  IVPB 1250 milliGRAM(s) IV Intermittent every 12 hours  witch hazel Pads 1 Application(s) Topical daily    MEDICATIONS  (PRN):  acetaminophen     Tablet .. 650 milliGRAM(s) Oral every 6 hours PRN Temp greater or equal to 38C (100.4F), Mild Pain (1 - 3)  albuterol    90 MICROgram(s) HFA Inhaler 2 Puff(s) Inhalation every 6 hours PRN Shortness of Breath and/or Wheezing  dextrose Oral Gel 15 Gram(s) Oral once PRN Blood Glucose LESS THAN 70 milliGRAM(s)/deciliter  melatonin 3 milliGRAM(s) Oral at bedtime PRN Insomnia      Allergies    penicillin (Rash)  amoxicillin (Fever)    Intolerances        SOCIAL HISTORY:     FAMILY HISTORY:  Family history of throat cancer (Father)    Family history of leukemia (Mother)        ---------------------------------------------------------------------------------------------------------------------    PHYSICAL EXAM:  T(C): 36.4 (17 Jul 2023 12:00), Max: 36.8 (16 Jul 2023 20:06)  T(F): 97.6 (17 Jul 2023 12:00), Max: 98.2 (16 Jul 2023 20:06)  HR: 64 (17 Jul 2023 12:00) (57 - 72)  BP: 146/90 (17 Jul 2023 12:00) (133/78 - 146/90)  BP(mean): --  RR: 18 (17 Jul 2023 12:00) (18 - 18)  SpO2: 98% (17 Jul 2023 12:00) (96% - 98%)    Parameters below as of 17 Jul 2023 12:00  Patient On (Oxygen Delivery Method): room air      CAPILLARY BLOOD GLUCOSE      POCT Blood Glucose.: 99 mg/dL (17 Jul 2023 08:49)  POCT Blood Glucose.: 141 mg/dL (16 Jul 2023 21:31)  POCT Blood Glucose.: 100 mg/dL (16 Jul 2023 17:27)        GEN: NAD, alert and oriented x 3  HEENT: Poor dentition. Otherwise, WNL.   CHEST: Symmetrical chest rise, no increased work of breathing, no stridor.  HEART: Regular rate  ABD: Soft, non-tender, non-distended  EXT: No erythema/edema. Warm, sensate, motor function intact. (Wounds described below).   INTEGUMENT/WOUND(S):  R hand 2nd digit  - Wound measuring 1.2 x 1.5 x 0.1 cm.  - Prior blistered site (now drained and unroofed, and largely epithelialized).   - No exudate/transudate. No erythema, induration, purulence, crepitus, bullae or malodor.   - Full PROM and AROM of digit. Sensory function intact as well.   Lateral L leg  - Site of prior wound, now fully epithelialized.   R buttock/Gluteal Cleft  - Site of prior infectious wound, which was debrided.   - Has completely healed, pink scar tissue present. No residual infection apparent.   RUE forearm  - Area of induration and erythema, measuring 4.5 x 4.5 cm, with small central scabbed portion measuring 0.5 x 0.5 cm.   - No fluctuance appreciated, no crepitus or bullae. No suppuration.   Perineum  - R portion of perineum is indurated, erythematous and tender. L aspect of perineum appears slightly indurated, but is without erythema.   - Area of fluctuance is noted at the base of the scrotum, with a punctate opening in the dermis which is draining purulent fluid spontaneously.   - No extension of erythema/induration beyond the base of the scrotum. No crepitus or bullae. No other areas of fluctuance appreciated. No inguinal lymphadenopathy.   - Widening of the draining sinus was attempted using a cotton-tipped applicator, but patient could not tolerate it.   - Roughly 5 cc's of pus was manually expressed. A sample was taken for abscess culture.     ---------------------------------------------------------------------------------------------------------------------    LABS/ CULTURES/ RADIOLOGY:                       12.1   6.57  )-----------( 198      ( 16 Jul 2023 03:00 )             37.1     07-16    130<L>  |  96<L>  |  9   ----------------------------<  85  4.3   |  22  |  0.80    Ca    9.4      16 Jul 2023 03:00  Phos  3.9     07-16  Mg     2.10     07-16    TPro  6.5  /  Alb  3.7  /  TBili  0.2  /  DBili  x   /  AST  16  /  ALT  14  /  AlkPhos  119  07-16          Urinalysis Basic - ( 16 Jul 2023 03:00 )    Color: x / Appearance: x / SG: x / pH: x  Gluc: 85 mg/dL / Ketone: x  / Bili: x / Urobili: x   Blood: x / Protein: x / Nitrite: x   Leuk Esterase: x / RBC: x / WBC x   Sq Epi: x / Non Sq Epi: x / Bacteria: x              ---------------------------------------------------------------------------------------------------------------------    RADIOLOGY  < from: CT Abdomen and Pelvis w/ IV Cont (07.15.23 @ 02:59) >    ACC: [id number] EXAM:  CT ABDOMEN AND PELVIS IC   ORDERED BY: VANIA MCKENZIE     PROCEDURE DATE:  07/15/2023          INTERPRETATION:  CLINICAL INFORMATION: Right inguinal and scrotal   tenderness. Rule out hernia or collection.    COMPARISON: CT abdomen pelvis 5/6/2023. CT chest 3/27/2017.    CONTRAST/COMPLICATIONS:  IV Contrast: Omnipaque 350  95 cc cc administered  Oral Contrast: NONE  Complications: None reported at time of study completion    PROCEDURE:  CT of the Abdomen and Pelvis was performed.  Sagittal and coronal reformats were performed.    FINDINGS:  LOWER CHEST: Bronchiectasis in the lung bases again noted, right worse   than left. Pleural thickening of the right posterior pleura.    LIVER: Hepatic cyst.  BILE DUCTS: Normal caliber.  GALLBLADDER: Within normal limits.  SPLEEN: Within normal limits.  PANCREAS: Within normal limits.  ADRENALS: 1.0 cm left adrenal adenoma, stable.  KIDNEYS/URETERS: Right renal cyst.    BLADDER: Within normal limits.  REPRODUCTIVE ORGANS: Diffuse scrotal edema right greater than left. In   the superficial perineal region there is a fluid collection measuring   approximately 2.5 x 1.5 cm (2-139) with associated adjacent subcutaneous   edema and no peripheral enhancement. Mildly enlarged prostate.    BOWEL: No bowel obstruction. Appendix is normal.  PERITONEUM: No ascites.  VESSELS: Atherosclerotic changes.  RETROPERITONEUM/LYMPH NODES: No lymphadenopathy.  ABDOMINAL WALL: Within normal limits.  BONES: Degenerative changes.    IMPRESSION:  Fluid collection in the right superficial perineal region with adjacent   subcutaneous edema.        --- End of Report ---          MANNIE ALARCON MD; Resident Radiologist  This document has been electronically signed.  KRISTINE BROOKS MD; Attending Radiologist  This document has been electronically signed. Jul 15 2023  5:30AM    < end of copied text >               WOUND SURGERY CONSULT NOTE  58 yo male pt with PMHx Schizoaffective D/O (Bipolar Type), CVID/ Hypogammaglobulinemia (monthly IVIG - last 7/6/2023), HTN, T2DM (on metformin), prior history of left gluteal wound requiring debridement in 6/2023 , COPD, hx of MRSA presented to ED with groin pain for 5 days. Patient states groin pain and swelling started 5 days ago and have progressively worsened. Patient states he had multiple "boils' in the axillary region and was started on bactrim which he finished on 7/14.   Patient states he also used Hibiclens on Friday prior to ED arrival. Patient states groin pain worsened over the last few days and radiating to abdomen which prompted him to come to ED.     Wound consult requested to assist w/ management of perineal wound. Patient has been assessed by both Urology and General Surgery, and no interventions have been recommended.   Patient also reports a recent history of a burn to his R hand second digit (while he was trying to light a cigarette) which he said formed a large blister which eventually popped and drained clear fluid. He denies any difficulty with flexing or extending his digit, and reports no numbness/pareshesia.   In addition, he reports a "boil" which has been present on his R forearm for several days. This lesion is consistent with his prior furuncles/boils, in that it is a raised, reddened and painful area. He denies any fevers/chills, and denies noting any drainage from the area. He denies any pain of his elbow joint, or any pain with RUE movement.       Current Diet: Diet, Consistent Carbohydrate w/Evening Snack:   1000mL Fluid Restriction ([id number]) (07-15-23 @ 20:40)      PAST MEDICAL & SURGICAL HISTORY:  Smoker      DM (diabetes mellitus)      HTN (hypertension)      Abscess of finger      Bipolar disorder      Chronic hyponatremia      CVID (common variable immunodeficiency)      Hyponatremia      Smoker      Deviated septum      Loss of teeth due to extraction  All teeth due to dental carries          REVIEW OF SYSTEMS:   CONSTITUTIONAL:  No fever. No chills. No dizziness.    HEENT:  No pain, erythema, or discharge. No blurring of vision. No sore throat, URI symptoms. No epistaxis. No tinnitus.    CARDIOVASCULAR:  No chest pain. No palpitations. No lower extremity edema.    RESPIRATORY:  No shortness of breath, cough, pain with respiration, pleuritic chest pain. No hemoptysis. No dyspnea. No paroxysmal nocturnal dyspnea.    GASTROINTESTINAL:  Normal appetite. No nausea, vomiting, diarrhea. No pain. No bloating. No melena.    GENITOURINARY:  No frequency, urgency, nocturia. No hematuria or dysuria.    MUSCULOSKELETAL:  No arthralgias or myalgias. No weakness.     INTEGUMENTARY:  Wounds/lesions as described in HPI.     NEUROLOGIC:  No headache. No neck pain. No numbness or tingling of the extremities.    PSYCHIATRIC:  No reported depression or anxiety.     ENDOCRINE:  No history of thyroid, diabetes or adrenal problems.    HEMATOLOGICAL:  No bleeding. No petechiae. No bruising.    MEDICATIONS  (STANDING):  atorvastatin 40 milliGRAM(s) Oral at bedtime  budesonide  80 MICROgram(s)/formoterol 4.5 MICROgram(s) Inhaler 2 Puff(s) Inhalation two times a day  chlorhexidine 2% Cloths 1 Application(s) Topical daily  dextrose 5%. 1000 milliLiter(s) (50 mL/Hr) IV Continuous <Continuous>  dextrose 5%. 1000 milliLiter(s) (100 mL/Hr) IV Continuous <Continuous>  dextrose 50% Injectable 25 Gram(s) IV Push once  dextrose 50% Injectable 25 Gram(s) IV Push once  dextrose 50% Injectable 12.5 Gram(s) IV Push once  diltiazem    milliGRAM(s) Oral daily  enoxaparin Injectable 40 milliGRAM(s) SubCutaneous every 24 hours  glucagon  Injectable 1 milliGRAM(s) IntraMuscular once  insulin lispro (ADMELOG) corrective regimen sliding scale   SubCutaneous three times a day before meals  insulin lispro (ADMELOG) corrective regimen sliding scale   SubCutaneous at bedtime  levothyroxine 50 MICROGram(s) Oral daily  mupirocin 2% Ointment 1 Application(s) Topical two times a day  nicotine - Inhaler 1 Each Inhalation two times a day  tiotropium 2.5 MICROgram(s) Inhaler 2 Puff(s) Inhalation daily  vancomycin  IVPB 1250 milliGRAM(s) IV Intermittent every 12 hours  witch hazel Pads 1 Application(s) Topical daily    MEDICATIONS  (PRN):  acetaminophen     Tablet .. 650 milliGRAM(s) Oral every 6 hours PRN Temp greater or equal to 38C (100.4F), Mild Pain (1 - 3)  albuterol    90 MICROgram(s) HFA Inhaler 2 Puff(s) Inhalation every 6 hours PRN Shortness of Breath and/or Wheezing  dextrose Oral Gel 15 Gram(s) Oral once PRN Blood Glucose LESS THAN 70 milliGRAM(s)/deciliter  melatonin 3 milliGRAM(s) Oral at bedtime PRN Insomnia      Allergies    penicillin (Rash)  amoxicillin (Fever)    Intolerances        SOCIAL HISTORY: psych pt    FAMILY HISTORY:  Family history of throat cancer (Father)    Family history of leukemia (Mother)        ---------------------------------------------------------------------------------------------------------------------    PHYSICAL EXAM:  T(C): 36.4 (17 Jul 2023 12:00), Max: 36.8 (16 Jul 2023 20:06)  T(F): 97.6 (17 Jul 2023 12:00), Max: 98.2 (16 Jul 2023 20:06)  HR: 64 (17 Jul 2023 12:00) (57 - 72)  BP: 146/90 (17 Jul 2023 12:00) (133/78 - 146/90)  BP(mean): --  RR: 18 (17 Jul 2023 12:00) (18 - 18)  SpO2: 98% (17 Jul 2023 12:00) (96% - 98%)    Parameters below as of 17 Jul 2023 12:00  Patient On (Oxygen Delivery Method): room air      CAPILLARY BLOOD GLUCOSE      POCT Blood Glucose.: 99 mg/dL (17 Jul 2023 08:49)  POCT Blood Glucose.: 141 mg/dL (16 Jul 2023 21:31)  POCT Blood Glucose.: 100 mg/dL (16 Jul 2023 17:27)        GEN: NAD, alert and oriented x 3  HEENT: Poor dentition. Otherwise, WNL.   CHEST: Symmetrical chest rise, no increased work of breathing, no stridor.  HEART: Regular rate  ABD: Soft, non-tender, non-distended  EXT: No erythema/edema. Warm, sensate, motor function intact. (Wounds described below).   INTEGUMENT/WOUND(S):  R hand 2nd digit  - Wound measuring 1.2 x 1.5 x 0.1 cm.  - Prior blistered site (now drained and unroofed, and largely epithelialized).   - No exudate/transudate. No erythema, induration, purulence, crepitus, bullae or malodor.   - Full PROM and AROM of digit. Sensory function intact as well.   Lateral L leg  - Site of prior wound, now fully epithelialized.   R buttock/Gluteal Cleft  - Site of prior infectious wound, which was debrided.   - Has completely healed, pink scar tissue present. No residual infection apparent.   RUE forearm  - Area of induration and erythema, measuring 4.5 x 4.5 cm, with small central scabbed portion measuring 0.5 x 0.5 cm.   - No fluctuance appreciated, no crepitus or bullae. No suppuration.   Perineum  - R portion of perineum is indurated, erythematous and tender. L aspect of perineum appears slightly indurated, but is without erythema.   - Area of fluctuance is noted at the base of the scrotum, with a punctate opening in the dermis which is draining purulent fluid spontaneously.   - No extension of erythema/induration beyond the base of the scrotum. No crepitus or bullae. No other areas of fluctuance appreciated. No inguinal lymphadenopathy.   - Widening of the draining sinus was attempted using a cotton-tipped applicator, but patient could not tolerate it.   - Roughly 5 cc's of pus was manually expressed. A sample was taken for abscess culture.     ---------------------------------------------------------------------------------------------------------------------    LABS/ CULTURES/ RADIOLOGY:                       12.1   6.57  )-----------( 198      ( 16 Jul 2023 03:00 )             37.1     07-16    130<L>  |  96<L>  |  9   ----------------------------<  85  4.3   |  22  |  0.80    Ca    9.4      16 Jul 2023 03:00  Phos  3.9     07-16  Mg     2.10     07-16    TPro  6.5  /  Alb  3.7  /  TBili  0.2  /  DBili  x   /  AST  16  /  ALT  14  /  AlkPhos  119  07-16          Urinalysis Basic - ( 16 Jul 2023 03:00 )    Color: x / Appearance: x / SG: x / pH: x  Gluc: 85 mg/dL / Ketone: x  / Bili: x / Urobili: x   Blood: x / Protein: x / Nitrite: x   Leuk Esterase: x / RBC: x / WBC x   Sq Epi: x / Non Sq Epi: x / Bacteria: x              ---------------------------------------------------------------------------------------------------------------------    RADIOLOGY  < from: CT Abdomen and Pelvis w/ IV Cont (07.15.23 @ 02:59) >    ACC: [id number] EXAM:  CT ABDOMEN AND PELVIS IC   ORDERED BY: VANIA MCKENZIE     PROCEDURE DATE:  07/15/2023          INTERPRETATION:  CLINICAL INFORMATION: Right inguinal and scrotal   tenderness. Rule out hernia or collection.    COMPARISON: CT abdomen pelvis 5/6/2023. CT chest 3/27/2017.    CONTRAST/COMPLICATIONS:  IV Contrast: Omnipaque 350  95 cc cc administered  Oral Contrast: NONE  Complications: None reported at time of study completion    PROCEDURE:  CT of the Abdomen and Pelvis was performed.  Sagittal and coronal reformats were performed.    FINDINGS:  LOWER CHEST: Bronchiectasis in the lung bases again noted, right worse   than left. Pleural thickening of the right posterior pleura.    LIVER: Hepatic cyst.  BILE DUCTS: Normal caliber.  GALLBLADDER: Within normal limits.  SPLEEN: Within normal limits.  PANCREAS: Within normal limits.  ADRENALS: 1.0 cm left adrenal adenoma, stable.  KIDNEYS/URETERS: Right renal cyst.    BLADDER: Within normal limits.  REPRODUCTIVE ORGANS: Diffuse scrotal edema right greater than left. In   the superficial perineal region there is a fluid collection measuring   approximately 2.5 x 1.5 cm (2-139) with associated adjacent subcutaneous   edema and no peripheral enhancement. Mildly enlarged prostate.    BOWEL: No bowel obstruction. Appendix is normal.  PERITONEUM: No ascites.  VESSELS: Atherosclerotic changes.  RETROPERITONEUM/LYMPH NODES: No lymphadenopathy.  ABDOMINAL WALL: Within normal limits.  BONES: Degenerative changes.    IMPRESSION:  Fluid collection in the right superficial perineal region with adjacent   subcutaneous edema.        --- End of Report ---          MANNIE ALARCON MD; Resident Radiologist  This document has been electronically signed.  KRISTINE BROOKS MD; Attending Radiologist  This document has been electronically signed. Jul 15 2023  5:30AM    < end of copied text >

## 2023-07-18 LAB
ANION GAP SERPL CALC-SCNC: 11 MMOL/L — SIGNIFICANT CHANGE UP (ref 7–14)
BUN SERPL-MCNC: 6 MG/DL — LOW (ref 7–23)
CALCIUM SERPL-MCNC: 9.7 MG/DL — SIGNIFICANT CHANGE UP (ref 8.4–10.5)
CHLORIDE SERPL-SCNC: 100 MMOL/L — SIGNIFICANT CHANGE UP (ref 98–107)
CO2 SERPL-SCNC: 22 MMOL/L — SIGNIFICANT CHANGE UP (ref 22–31)
CREAT SERPL-MCNC: 0.74 MG/DL — SIGNIFICANT CHANGE UP (ref 0.5–1.3)
EGFR: 107 ML/MIN/1.73M2 — SIGNIFICANT CHANGE UP
GLUCOSE SERPL-MCNC: 90 MG/DL — SIGNIFICANT CHANGE UP (ref 70–99)
HCT VFR BLD CALC: 38.3 % — LOW (ref 39–50)
HGB BLD-MCNC: 12.7 G/DL — LOW (ref 13–17)
MAGNESIUM SERPL-MCNC: 2 MG/DL — SIGNIFICANT CHANGE UP (ref 1.6–2.6)
MCHC RBC-ENTMCNC: 27.7 PG — SIGNIFICANT CHANGE UP (ref 27–34)
MCHC RBC-ENTMCNC: 33.2 GM/DL — SIGNIFICANT CHANGE UP (ref 32–36)
MCV RBC AUTO: 83.6 FL — SIGNIFICANT CHANGE UP (ref 80–100)
NRBC # BLD: 0 /100 WBCS — SIGNIFICANT CHANGE UP (ref 0–0)
NRBC # FLD: 0 K/UL — SIGNIFICANT CHANGE UP (ref 0–0)
PHOSPHATE SERPL-MCNC: 4.7 MG/DL — HIGH (ref 2.5–4.5)
PLATELET # BLD AUTO: 231 K/UL — SIGNIFICANT CHANGE UP (ref 150–400)
POTASSIUM SERPL-MCNC: 4.3 MMOL/L — SIGNIFICANT CHANGE UP (ref 3.5–5.3)
POTASSIUM SERPL-SCNC: 4.3 MMOL/L — SIGNIFICANT CHANGE UP (ref 3.5–5.3)
RBC # BLD: 4.58 M/UL — SIGNIFICANT CHANGE UP (ref 4.2–5.8)
RBC # FLD: 14.8 % — HIGH (ref 10.3–14.5)
SODIUM SERPL-SCNC: 133 MMOL/L — LOW (ref 135–145)
WBC # BLD: 6.48 K/UL — SIGNIFICANT CHANGE UP (ref 3.8–10.5)
WBC # FLD AUTO: 6.48 K/UL — SIGNIFICANT CHANGE UP (ref 3.8–10.5)

## 2023-07-18 PROCEDURE — 99233 SBSQ HOSP IP/OBS HIGH 50: CPT

## 2023-07-18 RX ADMIN — ENOXAPARIN SODIUM 40 MILLIGRAM(S): 100 INJECTION SUBCUTANEOUS at 17:47

## 2023-07-18 RX ADMIN — TIOTROPIUM BROMIDE 2 PUFF(S): 18 CAPSULE ORAL; RESPIRATORY (INHALATION) at 09:52

## 2023-07-18 RX ADMIN — BUDESONIDE AND FORMOTEROL FUMARATE DIHYDRATE 2 PUFF(S): 160; 4.5 AEROSOL RESPIRATORY (INHALATION) at 21:20

## 2023-07-18 RX ADMIN — Medication 50 MICROGRAM(S): at 06:15

## 2023-07-18 RX ADMIN — MUPIROCIN 1 APPLICATION(S): 20 OINTMENT TOPICAL at 17:47

## 2023-07-18 RX ADMIN — MUPIROCIN 1 APPLICATION(S): 20 OINTMENT TOPICAL at 06:16

## 2023-07-18 RX ADMIN — CHLORHEXIDINE GLUCONATE 1 APPLICATION(S): 213 SOLUTION TOPICAL at 11:23

## 2023-07-18 RX ADMIN — BUDESONIDE AND FORMOTEROL FUMARATE DIHYDRATE 2 PUFF(S): 160; 4.5 AEROSOL RESPIRATORY (INHALATION) at 09:50

## 2023-07-18 RX ADMIN — Medication 166.67 MILLIGRAM(S): at 17:46

## 2023-07-18 RX ADMIN — Medication 300 MILLIGRAM(S): at 06:16

## 2023-07-18 RX ADMIN — Medication 166.67 MILLIGRAM(S): at 06:15

## 2023-07-18 RX ADMIN — ATORVASTATIN CALCIUM 40 MILLIGRAM(S): 80 TABLET, FILM COATED ORAL at 21:20

## 2023-07-18 NOTE — PROGRESS NOTE ADULT - TIME BILLING
Preparing to see the patient including review of tests and other providers' notes, confirming history with family member, performing medical examination and evaluation, counseling and educating the patient/family/caregiver, ordering medications, tests and procedures, communicating with other health care professionals, documenting clinical information in the EMR, independently interpreting results and communicating results to the patient/family/caregiven, care coordination.

## 2023-07-19 LAB
ANION GAP SERPL CALC-SCNC: 14 MMOL/L — SIGNIFICANT CHANGE UP (ref 7–14)
BUN SERPL-MCNC: 9 MG/DL — SIGNIFICANT CHANGE UP (ref 7–23)
CALCIUM SERPL-MCNC: 9.8 MG/DL — SIGNIFICANT CHANGE UP (ref 8.4–10.5)
CHLORIDE SERPL-SCNC: 98 MMOL/L — SIGNIFICANT CHANGE UP (ref 98–107)
CO2 SERPL-SCNC: 22 MMOL/L — SIGNIFICANT CHANGE UP (ref 22–31)
CREAT SERPL-MCNC: 0.82 MG/DL — SIGNIFICANT CHANGE UP (ref 0.5–1.3)
EGFR: 104 ML/MIN/1.73M2 — SIGNIFICANT CHANGE UP
GLUCOSE SERPL-MCNC: 95 MG/DL — SIGNIFICANT CHANGE UP (ref 70–99)
HCT VFR BLD CALC: 39.8 % — SIGNIFICANT CHANGE UP (ref 39–50)
HGB BLD-MCNC: 13.1 G/DL — SIGNIFICANT CHANGE UP (ref 13–17)
MAGNESIUM SERPL-MCNC: 1.9 MG/DL — SIGNIFICANT CHANGE UP (ref 1.6–2.6)
MCHC RBC-ENTMCNC: 26.8 PG — LOW (ref 27–34)
MCHC RBC-ENTMCNC: 32.9 GM/DL — SIGNIFICANT CHANGE UP (ref 32–36)
MCV RBC AUTO: 81.4 FL — SIGNIFICANT CHANGE UP (ref 80–100)
NRBC # BLD: 0 /100 WBCS — SIGNIFICANT CHANGE UP (ref 0–0)
NRBC # FLD: 0 K/UL — SIGNIFICANT CHANGE UP (ref 0–0)
PHOSPHATE SERPL-MCNC: 4.1 MG/DL — SIGNIFICANT CHANGE UP (ref 2.5–4.5)
PLATELET # BLD AUTO: 228 K/UL — SIGNIFICANT CHANGE UP (ref 150–400)
POTASSIUM SERPL-MCNC: 4 MMOL/L — SIGNIFICANT CHANGE UP (ref 3.5–5.3)
POTASSIUM SERPL-SCNC: 4 MMOL/L — SIGNIFICANT CHANGE UP (ref 3.5–5.3)
RBC # BLD: 4.89 M/UL — SIGNIFICANT CHANGE UP (ref 4.2–5.8)
RBC # FLD: 14.7 % — HIGH (ref 10.3–14.5)
SODIUM SERPL-SCNC: 134 MMOL/L — LOW (ref 135–145)
VANCOMYCIN FLD-MCNC: 9.4 UG/ML — SIGNIFICANT CHANGE UP
WBC # BLD: 6.8 K/UL — SIGNIFICANT CHANGE UP (ref 3.8–10.5)
WBC # FLD AUTO: 6.8 K/UL — SIGNIFICANT CHANGE UP (ref 3.8–10.5)

## 2023-07-19 PROCEDURE — 99232 SBSQ HOSP IP/OBS MODERATE 35: CPT

## 2023-07-19 RX ORDER — NICOTINE POLACRILEX 2 MG
1 GUM BUCCAL
Refills: 0 | Status: DISCONTINUED | OUTPATIENT
Start: 2023-07-19 | End: 2023-07-21

## 2023-07-19 RX ORDER — VANCOMYCIN HCL 1 G
1250 VIAL (EA) INTRAVENOUS ONCE
Refills: 0 | Status: COMPLETED | OUTPATIENT
Start: 2023-07-19 | End: 2023-07-19

## 2023-07-19 RX ORDER — AER TRAVELER 0.5 G/1
1 SOLUTION RECTAL; TOPICAL DAILY
Refills: 0 | Status: DISCONTINUED | OUTPATIENT
Start: 2023-07-19 | End: 2023-07-21

## 2023-07-19 RX ADMIN — Medication 166.67 MILLIGRAM(S): at 10:41

## 2023-07-19 RX ADMIN — Medication 50 MICROGRAM(S): at 06:11

## 2023-07-19 RX ADMIN — ATORVASTATIN CALCIUM 40 MILLIGRAM(S): 80 TABLET, FILM COATED ORAL at 21:52

## 2023-07-19 RX ADMIN — Medication 300 MILLIGRAM(S): at 06:12

## 2023-07-19 RX ADMIN — ENOXAPARIN SODIUM 40 MILLIGRAM(S): 100 INJECTION SUBCUTANEOUS at 17:46

## 2023-07-19 RX ADMIN — BUDESONIDE AND FORMOTEROL FUMARATE DIHYDRATE 2 PUFF(S): 160; 4.5 AEROSOL RESPIRATORY (INHALATION) at 21:52

## 2023-07-19 RX ADMIN — MUPIROCIN 1 APPLICATION(S): 20 OINTMENT TOPICAL at 17:47

## 2023-07-19 RX ADMIN — MUPIROCIN 1 APPLICATION(S): 20 OINTMENT TOPICAL at 06:10

## 2023-07-19 RX ADMIN — Medication 1 EACH: at 17:43

## 2023-07-19 RX ADMIN — TIOTROPIUM BROMIDE 2 PUFF(S): 18 CAPSULE ORAL; RESPIRATORY (INHALATION) at 17:45

## 2023-07-19 RX ADMIN — CHLORHEXIDINE GLUCONATE 1 APPLICATION(S): 213 SOLUTION TOPICAL at 17:42

## 2023-07-19 RX ADMIN — BUDESONIDE AND FORMOTEROL FUMARATE DIHYDRATE 2 PUFF(S): 160; 4.5 AEROSOL RESPIRATORY (INHALATION) at 08:13

## 2023-07-19 RX ADMIN — Medication 166.67 MILLIGRAM(S): at 17:45

## 2023-07-19 NOTE — PHYSICAL THERAPY INITIAL EVALUATION ADULT - NSPTDISCHREC_GEN_A_CORE
Pt is at baseline and does not require skilled PT services, re consult if needed./No skilled PT needs

## 2023-07-19 NOTE — PHYSICAL THERAPY INITIAL EVALUATION ADULT - PERTINENT HX OF CURRENT PROBLEM, REHAB EVAL
Pt is a 57 year old male with past medical history of Schizoaffective (Bipolar Type), CVID/ Hypogammaglobulinemia (monthly IVIG - last 7/6/2023), HTN, T2DM (on metformin), prior history of left gluteal wound requiring debridement in 6/2023 , COPD, hx of MRSA presented to ED with groin pain for 5 days. Patient states groin pain and swelling started a few days ago and have progressively worsened. Patient states he had multiple "boils' in the axillary region and was started on bactrim which he finished on 7/14.   Patient states he also used Hibiclens on Friday prior to ED arrival. Patient states groin pain worsened over the last few days and radiating to abdomen which prompted him to come to ED.

## 2023-07-19 NOTE — PHYSICAL THERAPY INITIAL EVALUATION ADULT - ADDITIONAL COMMENTS
pt lives alone in an apartment with 3 steps to enter. Pt did not use an assistive device and was independent with ADLs prior. Pt reports no falls in the past 6 months. Pt denies numbness and tingling in bilateral UE and LE.   Pt left sitting at edge of the bed in NAD, tray table in front of pt, call john in reach and RN Ketty made aware.

## 2023-07-20 ENCOUNTER — TRANSCRIPTION ENCOUNTER (OUTPATIENT)
Age: 58
End: 2023-07-20

## 2023-07-20 LAB
-  AMPICILLIN/SULBACTAM: SIGNIFICANT CHANGE UP
-  CEFAZOLIN: SIGNIFICANT CHANGE UP
-  CLINDAMYCIN: SIGNIFICANT CHANGE UP
-  DAPTOMYCIN: SIGNIFICANT CHANGE UP
-  ERYTHROMYCIN: SIGNIFICANT CHANGE UP
-  GENTAMICIN: SIGNIFICANT CHANGE UP
-  LINEZOLID: SIGNIFICANT CHANGE UP
-  OXACILLIN: SIGNIFICANT CHANGE UP
-  PENICILLIN: SIGNIFICANT CHANGE UP
-  RIFAMPIN: SIGNIFICANT CHANGE UP
-  TETRACYCLINE: SIGNIFICANT CHANGE UP
-  TRIMETHOPRIM/SULFAMETHOXAZOLE: SIGNIFICANT CHANGE UP
-  VANCOMYCIN: SIGNIFICANT CHANGE UP
ANION GAP SERPL CALC-SCNC: 12 MMOL/L — SIGNIFICANT CHANGE UP (ref 7–14)
BUN SERPL-MCNC: 11 MG/DL — SIGNIFICANT CHANGE UP (ref 7–23)
CALCIUM SERPL-MCNC: 10.1 MG/DL — SIGNIFICANT CHANGE UP (ref 8.4–10.5)
CHLORIDE SERPL-SCNC: 100 MMOL/L — SIGNIFICANT CHANGE UP (ref 98–107)
CO2 SERPL-SCNC: 22 MMOL/L — SIGNIFICANT CHANGE UP (ref 22–31)
CREAT SERPL-MCNC: 0.89 MG/DL — SIGNIFICANT CHANGE UP (ref 0.5–1.3)
EGFR: 101 ML/MIN/1.73M2 — SIGNIFICANT CHANGE UP
GLUCOSE SERPL-MCNC: 92 MG/DL — SIGNIFICANT CHANGE UP (ref 70–99)
HCT VFR BLD CALC: 38 % — LOW (ref 39–50)
HGB BLD-MCNC: 12.5 G/DL — LOW (ref 13–17)
MAGNESIUM SERPL-MCNC: 2 MG/DL — SIGNIFICANT CHANGE UP (ref 1.6–2.6)
MCHC RBC-ENTMCNC: 26.9 PG — LOW (ref 27–34)
MCHC RBC-ENTMCNC: 32.9 GM/DL — SIGNIFICANT CHANGE UP (ref 32–36)
MCV RBC AUTO: 81.9 FL — SIGNIFICANT CHANGE UP (ref 80–100)
METHOD TYPE: SIGNIFICANT CHANGE UP
NRBC # BLD: 0 /100 WBCS — SIGNIFICANT CHANGE UP (ref 0–0)
NRBC # FLD: 0 K/UL — SIGNIFICANT CHANGE UP (ref 0–0)
PHOSPHATE SERPL-MCNC: 5 MG/DL — HIGH (ref 2.5–4.5)
PLATELET # BLD AUTO: 245 K/UL — SIGNIFICANT CHANGE UP (ref 150–400)
POTASSIUM SERPL-MCNC: 4.4 MMOL/L — SIGNIFICANT CHANGE UP (ref 3.5–5.3)
POTASSIUM SERPL-SCNC: 4.4 MMOL/L — SIGNIFICANT CHANGE UP (ref 3.5–5.3)
RBC # BLD: 4.64 M/UL — SIGNIFICANT CHANGE UP (ref 4.2–5.8)
RBC # FLD: 14.8 % — HIGH (ref 10.3–14.5)
SODIUM SERPL-SCNC: 134 MMOL/L — LOW (ref 135–145)
VANCOMYCIN TROUGH SERPL-MCNC: 9.7 UG/ML — LOW (ref 10–20)
WBC # BLD: 7.46 K/UL — SIGNIFICANT CHANGE UP (ref 3.8–10.5)
WBC # FLD AUTO: 7.46 K/UL — SIGNIFICANT CHANGE UP (ref 3.8–10.5)

## 2023-07-20 PROCEDURE — 99232 SBSQ HOSP IP/OBS MODERATE 35: CPT

## 2023-07-20 RX ADMIN — Medication 300 MILLIGRAM(S): at 07:02

## 2023-07-20 RX ADMIN — Medication 1 EACH: at 07:00

## 2023-07-20 RX ADMIN — MUPIROCIN 1 APPLICATION(S): 20 OINTMENT TOPICAL at 17:23

## 2023-07-20 RX ADMIN — Medication 50 MICROGRAM(S): at 07:02

## 2023-07-20 RX ADMIN — ATORVASTATIN CALCIUM 40 MILLIGRAM(S): 80 TABLET, FILM COATED ORAL at 21:17

## 2023-07-20 RX ADMIN — Medication 166.67 MILLIGRAM(S): at 20:01

## 2023-07-20 RX ADMIN — MUPIROCIN 1 APPLICATION(S): 20 OINTMENT TOPICAL at 07:02

## 2023-07-20 RX ADMIN — BUDESONIDE AND FORMOTEROL FUMARATE DIHYDRATE 2 PUFF(S): 160; 4.5 AEROSOL RESPIRATORY (INHALATION) at 10:55

## 2023-07-20 RX ADMIN — CHLORHEXIDINE GLUCONATE 1 APPLICATION(S): 213 SOLUTION TOPICAL at 12:54

## 2023-07-20 RX ADMIN — BUDESONIDE AND FORMOTEROL FUMARATE DIHYDRATE 2 PUFF(S): 160; 4.5 AEROSOL RESPIRATORY (INHALATION) at 21:17

## 2023-07-20 RX ADMIN — ENOXAPARIN SODIUM 40 MILLIGRAM(S): 100 INJECTION SUBCUTANEOUS at 17:23

## 2023-07-20 RX ADMIN — TIOTROPIUM BROMIDE 2 PUFF(S): 18 CAPSULE ORAL; RESPIRATORY (INHALATION) at 10:56

## 2023-07-20 RX ADMIN — Medication 166.67 MILLIGRAM(S): at 07:00

## 2023-07-20 NOTE — DISCHARGE NOTE PROVIDER - NSDCFUSCHEDAPPT_GEN_ALL_CORE_FT
Nichelle Diaz  [facility]  WOUNDCARE [address]  Scheduled Appointment: 08/01/2023    [facility]  RHEUM [address]  Scheduled Appointment: 08/03/2023    [facility]  [address]  Scheduled Appointment: 08/31/2023    Boxer, Mitchell B  [facility]  ALLERGYIM [address]  Scheduled Appointment: 09/20/2023    [facility]  [address]HEUM [address]  Scheduled Appointment: 09/28/2023

## 2023-07-20 NOTE — DISCHARGE NOTE PROVIDER - NSDCCPCAREPLAN_GEN_ALL_CORE_FT
PRINCIPAL DISCHARGE DIAGNOSIS  Diagnosis: Perineal abscess, superficial  Assessment and Plan of Treatment: You were evaluated for abscess of the perineal area. You were evaluated by general surgery team and urology and deemed that surgical intervention was not required inpatient.  Your abscess spontaenously drained on 7/17 with good response.  Wound care nursing staff evaluated your wound and gave recommendations. You completed 7 days of Vancomycin IV antibiotics. Please follow up with your doctors are outpatient.      SECONDARY DISCHARGE DIAGNOSES  Diagnosis: Scrotal swelling  Assessment and Plan of Treatment: You were evaluated for any scrotal infection. Ultrasound testing and evaluation with urologist deemed that edema was related to perineal infection rather than scrotal problem.  Continue treatment for perineal infection as above.

## 2023-07-20 NOTE — DISCHARGE NOTE PROVIDER - HOSPITAL COURSE
57M PMHx Schizoaffective D/O (Bipolar Type), HTN, HLD, Hypothyroidism, Daily Smoker, CVID/ Hypogammaglobulinemia (monthly IVIG - last 7/6/2023), T2DM, COPD, recent Lt gluteal MRSA abscess/cellulitis w/ MRSA bacteremia s/p Debridement 6/2023 p/w Lt perineal c/f abscess – failed outpt Bactrim tx. Finished 7 day course of IV Vanco while inpatient. No further abx needed. Course c/b hyponatremia now resolved w/ fluid restriction.  Pt also evaluated by Urology for Scrotal swelling. CT A/P showed: Diffuse scrotal edema right greater than left. Urology c/s- Recommend elevation of scrotum to improve scrotal edema     Case discussed with Dr. Dunn on 7/21, pt medically stable for discharge home. Pt states he has refills for all meds at home. Offered nicotine patch/gum/lozenge/inhaler - pt refused all stating he has tried multiple times in the past and it did not help.

## 2023-07-20 NOTE — DISCHARGE NOTE PROVIDER - NSDCFUADDINST_GEN_ALL_CORE_FT
Wound care instructions   -->Right second finger trauma wound and right forearm wound: Clean with NS. Pat dry. Apply liquid barrier film to periwound skin, apply Medihoney gel to wound bed. Cover with silicone foam with border. Change daily or prn  if soiled.   -->Right perineum abscess spontaneouly draining: Clean area with NS. Pat dry. Place Aquacel AG and ABD pad over pinpoiint opening to contain drainage. Secure with briefs, changes at least daily or prn of soiled. Monitor for tissue type changes.

## 2023-07-20 NOTE — DISCHARGE NOTE PROVIDER - CARE PROVIDER_API CALL
Nichelle Diaz  Surgery  [address] [facility], [address] [place] [address]  Phone: [phone]  Fax: [phone]  Follow Up Time:     Ketan Mcclendon  Internal Medicine  [address] [facility]  [place] [address]  Phone: [phone]  Fax: [phone]  Follow Up Time:

## 2023-07-20 NOTE — DISCHARGE NOTE PROVIDER - NSDCMRMEDTOKEN_GEN_ALL_CORE_FT
Abilify Maintena Prefilled Syringe 300 mg intramuscular injection, extended release: 300 milligram(s) intramuscularly once a month  Albuterol (Eqv-ProAir HFA) 90 mcg/inh inhalation aerosol: 2 puff(s) inhaled every 6 hours, As Needed    Last filled 3/2022  dilTIAZem 300 mg/24 hours oral capsule, extended release: 1 cap(s) orally once a day (in the morning)  levothyroxine 50 mcg (0.05 mg) oral tablet: 1 tab(s) orally once a day  metFORMIN 500 mg oral tablet: 1 tab(s) orally 2 times a day  rosuvastatin 10 mg oral tablet: 1 tab(s) orally once a day (Last dispensed 12/2022 x 90 day).   Trelegy Ellipta 100 mcg-62.5 mcg-25 mcg/inh inhalation powder: 1 puff(s) inhaled once a day   Abilify Maintena Prefilled Syringe 300 mg intramuscular injection, extended release: 300 milligram(s) intramuscularly once a month  Albuterol (Eqv-ProAir HFA) 90 mcg/inh inhalation aerosol: 2 puff(s) inhaled every 6 hours, As Needed    Last filled 3/2022  dilTIAZem 300 mg/24 hours oral capsule, extended release: 1 cap(s) orally once a day (in the morning)  levothyroxine 50 mcg (0.05 mg) oral tablet: 1 tab(s) orally once a day  metFORMIN 500 mg oral tablet: 1 tab(s) orally 2 times a day  rosuvastatin 10 mg oral tablet: 1 tab(s) orally once a day  Trelegy Ellipta 100 mcg-62.5 mcg-25 mcg/inh inhalation powder: 1 puff(s) inhaled once a day

## 2023-07-20 NOTE — DISCHARGE NOTE PROVIDER - NSDCQMERRANDS_GEN_ALL_CORE
Outside records received from [facility], Dr. Townsend. Patient with Hx of membranous GN, nephrotic range proteinuria has not followed up with treatment plan from transplant team or primary Nephrologist. Patient discontinued Lisinopril since 2/2017 due to hypotension. Was last seen by primary Nephrologist 9/11/17.     Current IS: Prograf 1/0.5, Cellcept 500mg bid, Prednisone 10mg daily. Hospitalized 8/19/17 due to witness seizures. Currently taking Lasix 80mg daily for hypervolemia. Has now stopped smoking.  
No

## 2023-07-21 ENCOUNTER — TRANSCRIPTION ENCOUNTER (OUTPATIENT)
Age: 58
End: 2023-07-21

## 2023-07-21 VITALS
HEART RATE: 61 BPM | TEMPERATURE: 97 F | DIASTOLIC BLOOD PRESSURE: 84 MMHG | RESPIRATION RATE: 18 BRPM | SYSTOLIC BLOOD PRESSURE: 140 MMHG | OXYGEN SATURATION: 98 %

## 2023-07-21 LAB
CULTURE RESULTS: SIGNIFICANT CHANGE UP
CULTURE RESULTS: SIGNIFICANT CHANGE UP
[facility] SOURCE: SIGNIFICANT CHANGE UP
[facility] SOURCE: SIGNIFICANT CHANGE UP

## 2023-07-21 PROCEDURE — 99239 HOSP IP/OBS DSCHRG MGMT >30: CPT

## 2023-07-21 RX ADMIN — MUPIROCIN 1 APPLICATION(S): 20 OINTMENT TOPICAL at 05:22

## 2023-07-21 RX ADMIN — CHLORHEXIDINE GLUCONATE 1 APPLICATION(S): 213 SOLUTION TOPICAL at 11:22

## 2023-07-21 RX ADMIN — Medication 50 MICROGRAM(S): at 05:22

## 2023-07-21 RX ADMIN — BUDESONIDE AND FORMOTEROL FUMARATE DIHYDRATE 2 PUFF(S): 160; 4.5 AEROSOL RESPIRATORY (INHALATION) at 11:23

## 2023-07-21 RX ADMIN — Medication 1 EACH: at 05:25

## 2023-07-21 RX ADMIN — Medication 300 MILLIGRAM(S): at 05:22

## 2023-07-21 RX ADMIN — Medication 166.67 MILLIGRAM(S): at 08:32

## 2023-07-21 NOTE — PROGRESS NOTE ADULT - PROBLEM SELECTOR PLAN 11
Continue Trelegy 100 QD  Continue albuterol 2 puffs QID prn
Alert and oriented, no focal deficits, no motor or sensory deficits.

## 2023-07-21 NOTE — PROGRESS NOTE ADULT - PROBLEM SELECTOR PLAN 3
Continue home medication  Monitor BP and adjust medications accordingly   DASH diet

## 2023-07-21 NOTE — PROGRESS NOTE ADULT - PROBLEM SELECTOR PLAN 2
pt states he has hx of hypoNa and on 1L fluid restriction but has not been following it   likely cause polydipsia and in the past, Na has improved with fluid restriction   fluid restriction to 1L for now  - continue to monitor BMP
on admission, Na 123 -->130  pt states he has hx of hypoNa and on 1L fluid restriction but has not been following it   likely cause polydipsia and in the past, Na has improved with fluid restriction   fluid restriction to 1L for now
pt states he has hx of hypoNa and on 1L fluid restriction but has not been following it   likely cause polydipsia and in the past, Na has improved with fluid restriction   fluid restriction to 1L for now  - continue to monitor BMP

## 2023-07-21 NOTE — PROGRESS NOTE ADULT - SUBJECTIVE AND OBJECTIVE BOX
Surgery Progress Note    Overnight Events: No acute events overnight.    SUBJECTIVE: Patient seen and examined at bedside with surgical team, patient without complaints, some drainage from scrotum/perineum. Denies fever, chills, CP, SOB nausea, vomiting, dizziness.      OBJECTIVE:    Vital Signs Last 24 Hrs  T(C): 36.4 (18 Jul 2023 06:18), Max: 36.6 (17 Jul 2023 21:44)  T(F): 97.5 (18 Jul 2023 06:18), Max: 97.8 (17 Jul 2023 21:44)  HR: 58 (18 Jul 2023 06:18) (58 - 59)  BP: 127/74 (18 Jul 2023 06:18) (127/74 - 138/76)  BP(mean): --  RR: 18 (18 Jul 2023 06:18) (18 - 18)  SpO2: 100% (18 Jul 2023 06:18) (97% - 100%)    Parameters below as of 18 Jul 2023 06:18  Patient On (Oxygen Delivery Method): room air    I&O's Detail  MEDICATIONS  (STANDING):  atorvastatin 40 milliGRAM(s) Oral at bedtime  budesonide  80 MICROgram(s)/formoterol 4.5 MICROgram(s) Inhaler 2 Puff(s) Inhalation two times a day  chlorhexidine 2% Cloths 1 Application(s) Topical daily  dextrose 5%. 1000 milliLiter(s) (100 mL/Hr) IV Continuous <Continuous>  dextrose 5%. 1000 milliLiter(s) (50 mL/Hr) IV Continuous <Continuous>  dextrose 50% Injectable 25 Gram(s) IV Push once  dextrose 50% Injectable 12.5 Gram(s) IV Push once  dextrose 50% Injectable 25 Gram(s) IV Push once  diltiazem    milliGRAM(s) Oral daily  enoxaparin Injectable 40 milliGRAM(s) SubCutaneous every 24 hours  glucagon  Injectable 1 milliGRAM(s) IntraMuscular once  insulin lispro (ADMELOG) corrective regimen sliding scale   SubCutaneous three times a day before meals  insulin lispro (ADMELOG) corrective regimen sliding scale   SubCutaneous at bedtime  levothyroxine 50 MICROGram(s) Oral daily  mupirocin 2% Ointment 1 Application(s) Topical two times a day  tiotropium 2.5 MICROgram(s) Inhaler 2 Puff(s) Inhalation daily  vancomycin  IVPB 1250 milliGRAM(s) IV Intermittent every 12 hours    MEDICATIONS  (PRN):  acetaminophen     Tablet .. 650 milliGRAM(s) Oral every 6 hours PRN Temp greater or equal to 38C (100.4F), Mild Pain (1 - 3)  albuterol    90 MICROgram(s) HFA Inhaler 2 Puff(s) Inhalation every 6 hours PRN Shortness of Breath and/or Wheezing  dextrose Oral Gel 15 Gram(s) Oral once PRN Blood Glucose LESS THAN 70 milliGRAM(s)/deciliter  melatonin 3 milliGRAM(s) Oral at bedtime PRN Insomnia      PHYSICAL EXAM:  Constitutional: A&Ox3, NAD  Respiratory: Unlabored breathing  Abdomen: Soft, nondistended, NTTP. No rebound or guarding.  PANCHTIO: scrotal/perineal collection with some induration and open draining site, able to express purulence and some blood.  Extremities: WWP, KANG spontaneously    LABS:                        12.7   6.48  )-----------( 231      ( 18 Jul 2023 05:14 )             38.3     07-18    133<L>  |  100  |  6<L>  ----------------------------<  90  4.3   |  22  |  0.74    Ca    9.7      18 Jul 2023 05:14  Phos  4.7     07-18  Mg     2.00     07-18          Urinalysis Basic - ( 18 Jul 2023 05:14 )    Color: x / Appearance: x / SG: x / pH: x  Gluc: 90 mg/dL / Ketone: x  / Bili: x / Urobili: x   Blood: x / Protein: x / Nitrite: x   Leuk Esterase: x / RBC: x / WBC x   Sq Epi: x / Non Sq Epi: x / Bacteria: x        IMAGING:
PROGRESS NOTE:     Patient is a 57y old  Male who presents with a chief complaint of groin pain (15 Jul 2023 21:01)      SUBJECTIVE / OVERNIGHT EVENTS: No acute overnight events. Pt states he is feeling well, but having some discomfort in his scrotum.     ADDITIONAL REVIEW OF SYSTEMS:    MEDICATIONS  (STANDING):  atorvastatin 40 milliGRAM(s) Oral at bedtime  budesonide  80 MICROgram(s)/formoterol 4.5 MICROgram(s) Inhaler 2 Puff(s) Inhalation two times a day  dextrose 5%. 1000 milliLiter(s) (50 mL/Hr) IV Continuous <Continuous>  dextrose 5%. 1000 milliLiter(s) (100 mL/Hr) IV Continuous <Continuous>  dextrose 50% Injectable 12.5 Gram(s) IV Push once  dextrose 50% Injectable 25 Gram(s) IV Push once  dextrose 50% Injectable 25 Gram(s) IV Push once  diltiazem    milliGRAM(s) Oral daily  enoxaparin Injectable 40 milliGRAM(s) SubCutaneous every 24 hours  glucagon  Injectable 1 milliGRAM(s) IntraMuscular once  insulin lispro (ADMELOG) corrective regimen sliding scale   SubCutaneous at bedtime  insulin lispro (ADMELOG) corrective regimen sliding scale   SubCutaneous three times a day before meals  levothyroxine 50 MICROGram(s) Oral daily  tiotropium 2.5 MICROgram(s) Inhaler 2 Puff(s) Inhalation daily  vancomycin  IVPB 1000 milliGRAM(s) IV Intermittent every 12 hours  vancomycin  IVPB        MEDICATIONS  (PRN):  acetaminophen     Tablet .. 650 milliGRAM(s) Oral every 6 hours PRN Temp greater or equal to 38C (100.4F), Mild Pain (1 - 3)  albuterol    90 MICROgram(s) HFA Inhaler 2 Puff(s) Inhalation every 6 hours PRN Shortness of Breath and/or Wheezing  dextrose Oral Gel 15 Gram(s) Oral once PRN Blood Glucose LESS THAN 70 milliGRAM(s)/deciliter  melatonin 3 milliGRAM(s) Oral at bedtime PRN Insomnia      CAPILLARY BLOOD GLUCOSE      POCT Blood Glucose.: 101 mg/dL (16 Jul 2023 08:34)    I&O's Summary      PHYSICAL EXAM:  Vital Signs Last 24 Hrs  T(C): 36.5 (16 Jul 2023 05:00), Max: 36.6 (15 Jul 2023 20:23)  T(F): 97.7 (16 Jul 2023 05:00), Max: 97.9 (15 Jul 2023 20:23)  HR: 88 (16 Jul 2023 05:00) (70 - 88)  BP: 131/72 (16 Jul 2023 05:00) (131/72 - 136/72)  BP(mean): --  RR: 18 (16 Jul 2023 05:00) (18 - 18)  SpO2: 100% (16 Jul 2023 05:00) (95% - 100%)    Parameters below as of 16 Jul 2023 05:00  Patient On (Oxygen Delivery Method): room air        PHYSICAL EXAM:  GENERAL: NAD, pt resting in ebd   HEAD:  Atraumatic, Normocephalic  EYES: EOMI, PERRLA, conjunctiva and sclera clear  NECK: Supple, No elevated JVD  CHEST/LUNG: Clear to auscultation bilaterally; No wheeze  HEART: Regular rate and rhythm; No murmurs, rubs, or gallops  ABDOMEN: Soft, Nontender, Nondistended; Bowel sounds present  EXTREMITIES:  2+ Peripheral Pulses, No clubbing, cyanosis, or edema  PSYCH: AAOx3, calm and cooperative   NEUROLOGY: CN II-XII grossly intact, moving all extremities  SKIN: + scrotal swelling with surrounding erythema and inguinal induration  noted, nontender to palpation     LABS:                        12.1   6.57  )-----------( 198      ( 16 Jul 2023 03:00 )             37.1     07-16    130<L>  |  96<L>  |  9   ----------------------------<  85  4.3   |  22  |  0.80    Ca    9.4      16 Jul 2023 03:00  Phos  3.9     07-16  Mg     2.10     07-16    TPro  6.5  /  Alb  3.7  /  TBili  0.2  /  DBili  x   /  AST  16  /  ALT  14  /  AlkPhos  119  07-16          Urinalysis Basic - ( 16 Jul 2023 03:00 )    Color: x / Appearance: x / SG: x / pH: x  Gluc: 85 mg/dL / Ketone: x  / Bili: x / Urobili: x   Blood: x / Protein: x / Nitrite: x   Leuk Esterase: x / RBC: x / WBC x   Sq Epi: x / Non Sq Epi: x / Bacteria: x        Culture - Urine (collected 14 Jul 2023 23:47)  Source: Clean Catch Clean Catch (Midstream)  Final Report (16 Jul 2023 10:02):    <10,000 CFU/mL Normal Urogenital Sydni        RADIOLOGY & ADDITIONAL TESTS:  Results Reviewed:   Imaging Personally Reviewed:  Electrocardiogram Personally Reviewed:    COORDINATION OF CARE:  Care Discussed with Consultants/Other Providers [Y/N]:  Prior or Outpatient Records Reviewed [Y/N]:  
[facility] Division of Hospital Medicine  Garrett Dunn MD  Pager (M-F, 8A-5P): [phone]  Other Times:  [phone]    Patient is a 57y old  Male who presents with a chief complaint of groin pain (20 Jul 2023 12:52)    SUBJECTIVE / OVERNIGHT EVENTS:  Offers no new complaints. No F/C, N/V, CP, SOB, Cough, lightheadedness, dizziness, abdominal pain, diarrhea, dysuria.    MEDICATIONS  (STANDING):  atorvastatin 40 milliGRAM(s) Oral at bedtime  budesonide  80 MICROgram(s)/formoterol 4.5 MICROgram(s) Inhaler 2 Puff(s) Inhalation two times a day  chlorhexidine 2% Cloths 1 Application(s) Topical daily  dextrose 5%. 1000 milliLiter(s) (50 mL/Hr) IV Continuous <Continuous>  dextrose 5%. 1000 milliLiter(s) (100 mL/Hr) IV Continuous <Continuous>  dextrose 50% Injectable 25 Gram(s) IV Push once  dextrose 50% Injectable 25 Gram(s) IV Push once  dextrose 50% Injectable 12.5 Gram(s) IV Push once  diltiazem    milliGRAM(s) Oral daily  enoxaparin Injectable 40 milliGRAM(s) SubCutaneous every 24 hours  glucagon  Injectable 1 milliGRAM(s) IntraMuscular once  insulin lispro (ADMELOG) corrective regimen sliding scale   SubCutaneous three times a day before meals  insulin lispro (ADMELOG) corrective regimen sliding scale   SubCutaneous at bedtime  levothyroxine 50 MICROGram(s) Oral daily  mupirocin 2% Ointment 1 Application(s) Topical two times a day  nicotine - Inhaler 1 Each Inhalation two times a day  tiotropium 2.5 MICROgram(s) Inhaler 2 Puff(s) Inhalation daily  vancomycin  IVPB 1250 milliGRAM(s) IV Intermittent every 12 hours  witch hazel Pads 1 Application(s) Topical daily    MEDICATIONS  (PRN):  acetaminophen     Tablet .. 650 milliGRAM(s) Oral every 6 hours PRN Temp greater or equal to 38C (100.4F), Mild Pain (1 - 3)  albuterol    90 MICROgram(s) HFA Inhaler 2 Puff(s) Inhalation every 6 hours PRN Shortness of Breath and/or Wheezing  dextrose Oral Gel 15 Gram(s) Oral once PRN Blood Glucose LESS THAN 70 milliGRAM(s)/deciliter  melatonin 3 milliGRAM(s) Oral at bedtime PRN Insomnia      Vital Signs Last 24 Hrs  T(C): 36.3 (21 Jul 2023 10:03), Max: 36.7 (20 Jul 2023 20:20)  T(F): 97.4 (21 Jul 2023 10:03), Max: 98 (20 Jul 2023 20:20)  HR: 60 (21 Jul 2023 10:03) (60 - 64)  BP: 147/84 (21 Jul 2023 10:03) (147/84 - 154/78)  BP(mean): --  RR: 18 (21 Jul 2023 10:03) (18 - 18)  SpO2: 99% (21 Jul 2023 10:03) (98% - 99%)    Parameters below as of 21 Jul 2023 10:03  Patient On (Oxygen Delivery Method): room air      CAPILLARY BLOOD GLUCOSE      POCT Blood Glucose.: 123 mg/dL (21 Jul 2023 12:29)  POCT Blood Glucose.: 104 mg/dL (21 Jul 2023 08:38)  POCT Blood Glucose.: 123 mg/dL (20 Jul 2023 21:27)  POCT Blood Glucose.: 146 mg/dL (20 Jul 2023 17:18)    I&O's Summary    21 Jul 2023 07:01  -  21 Jul 2023 13:15  --------------------------------------------------------  IN: 0 mL / OUT: 600 mL / NET: -600 mL        PHYSICAL EXAM:  CONSTITUTIONAL: NAD  EYES: PERRLA; conjunctiva and sclera clear  ENMT: Moist oral mucosa, no pharyngeal injection or exudates; normal dentition  NECK: Supple, no palpable masses; no thyromegaly  RESPIRATORY: Normal respiratory effort; lungs are clear to auscultation bilaterally  CARDIOVASCULAR: Regular rate and rhythm, normal S1 and S2, no murmur/rub/gallop; No lower extremity edema; Peripheral pulses are 2+ bilaterally  ABDOMEN: Nontender to palpation, normoactive bowel sounds, no rebound/guarding; No hepatosplenomegaly; scrotal edema  MUSCULOSKELETAL:  Normal gait; no clubbing or cyanosis of digits; no joint swelling or tenderness to palpation  PSYCH: A+O to person, place, and time; affect appropriate  NEUROLOGY: CN 2-12 are intact and symmetric; no gross sensory deficits   SKIN: dressing C/D/I    LABS:                        12.5   7.46  )-----------( 245      ( 20 Jul 2023 05:30 )             38.0     07-20    134<L>  |  100  |  11  ----------------------------<  92  4.4   |  22  |  0.89    Ca    10.1      20 Jul 2023 05:30  Phos  5.0     07-20  Mg     2.00     07-20            Urinalysis Basic - ( 20 Jul 2023 05:30 )    Color: x / Appearance: x / SG: x / pH: x  Gluc: 92 mg/dL / Ketone: x  / Bili: x / Urobili: x   Blood: x / Protein: x / Nitrite: x   Leuk Esterase: x / RBC: x / WBC x   Sq Epi: x / Non Sq Epi: x / Bacteria: x        RADIOLOGY & ADDITIONAL TESTS:    Imaging Personally Reviewed:    Care Discussed with Consultants/Other Providers:  
[facility] Division of Hospital Medicine  Garrett Dunn MD  Pager (NOAH-COOKIE, 8A-5P): [phone]  Other Times:  [phone]    Patient is a 57y old  Male who presents with a chief complaint of groin pain (18 Jul 2023 12:13)    SUBJECTIVE / OVERNIGHT EVENTS:  Patient's wound started spontaneously draining overnight - bloody/pus as patient describe it.  This AM, no new complaints. No F/C, N/V, CP, SOB, Cough, lightheadedness, dizziness, abdominal pain, diarrhea, dysuria.    MEDICATIONS  (STANDING):  atorvastatin 40 milliGRAM(s) Oral at bedtime  budesonide  80 MICROgram(s)/formoterol 4.5 MICROgram(s) Inhaler 2 Puff(s) Inhalation two times a day  chlorhexidine 2% Cloths 1 Application(s) Topical daily  dextrose 5%. 1000 milliLiter(s) (50 mL/Hr) IV Continuous <Continuous>  dextrose 5%. 1000 milliLiter(s) (100 mL/Hr) IV Continuous <Continuous>  dextrose 50% Injectable 25 Gram(s) IV Push once  dextrose 50% Injectable 12.5 Gram(s) IV Push once  dextrose 50% Injectable 25 Gram(s) IV Push once  diltiazem    milliGRAM(s) Oral daily  enoxaparin Injectable 40 milliGRAM(s) SubCutaneous every 24 hours  glucagon  Injectable 1 milliGRAM(s) IntraMuscular once  insulin lispro (ADMELOG) corrective regimen sliding scale   SubCutaneous three times a day before meals  insulin lispro (ADMELOG) corrective regimen sliding scale   SubCutaneous at bedtime  levothyroxine 50 MICROGram(s) Oral daily  mupirocin 2% Ointment 1 Application(s) Topical two times a day  tiotropium 2.5 MICROgram(s) Inhaler 2 Puff(s) Inhalation daily  vancomycin  IVPB 1250 milliGRAM(s) IV Intermittent every 12 hours    MEDICATIONS  (PRN):  acetaminophen     Tablet .. 650 milliGRAM(s) Oral every 6 hours PRN Temp greater or equal to 38C (100.4F), Mild Pain (1 - 3)  albuterol    90 MICROgram(s) HFA Inhaler 2 Puff(s) Inhalation every 6 hours PRN Shortness of Breath and/or Wheezing  dextrose Oral Gel 15 Gram(s) Oral once PRN Blood Glucose LESS THAN 70 milliGRAM(s)/deciliter  melatonin 3 milliGRAM(s) Oral at bedtime PRN Insomnia      Vital Signs Last 24 Hrs  T(C): 36.4 (18 Jul 2023 06:18), Max: 36.6 (17 Jul 2023 21:44)  T(F): 97.5 (18 Jul 2023 06:18), Max: 97.8 (17 Jul 2023 21:44)  HR: 58 (18 Jul 2023 06:18) (58 - 59)  BP: 127/74 (18 Jul 2023 06:18) (127/74 - 138/76)  BP(mean): --  RR: 18 (18 Jul 2023 06:18) (18 - 18)  SpO2: 100% (18 Jul 2023 06:18) (97% - 100%)    Parameters below as of 18 Jul 2023 06:18  Patient On (Oxygen Delivery Method): room air      CAPILLARY BLOOD GLUCOSE      POCT Blood Glucose.: 101 mg/dL (18 Jul 2023 12:22)  POCT Blood Glucose.: 119 mg/dL (18 Jul 2023 08:20)  POCT Blood Glucose.: 110 mg/dL (18 Jul 2023 06:26)  POCT Blood Glucose.: 133 mg/dL (17 Jul 2023 22:46)  POCT Blood Glucose.: 99 mg/dL (17 Jul 2023 18:11)    I&O's Summary      PHYSICAL EXAM:  CONSTITUTIONAL: NAD, well-developed, well-groomed  EYES: PERRLA; conjunctiva and sclera clear  ENMT: Moist oral mucosa, no pharyngeal injection or exudates; normal dentition  NECK: Supple, no palpable masses; no thyromegaly  RESPIRATORY: Normal respiratory effort; lungs are clear to auscultation bilaterally  CARDIOVASCULAR: Regular rate and rhythm, normal S1 and S2, no murmur/rub/gallop; No lower extremity edema; Peripheral pulses are 2+ bilaterally  ABDOMEN: Nontender to palpation, normoactive bowel sounds, no rebound/guarding; No hepatosplenomegaly  MUSCULOSKELETAL:  Normal gait; no clubbing or cyanosis of digits; no joint swelling or tenderness to palpation  PSYCH: A+O to person, place, and time; affect appropriate  NEUROLOGY: CN 2-12 are intact and symmetric; no gross sensory deficits   SKIN: No rashes; no palpable lesions      LABS:                        12.7   6.48  )-----------( 231      ( 18 Jul 2023 05:14 )             38.3     07-18    133<L>  |  100  |  6<L>  ----------------------------<  90  4.3   |  22  |  0.74    Ca    9.7      18 Jul 2023 05:14  Phos  4.7     07-18  Mg     2.00     07-18            Urinalysis Basic - ( 18 Jul 2023 05:14 )    Color: x / Appearance: x / SG: x / pH: x  Gluc: 90 mg/dL / Ketone: x  / Bili: x / Urobili: x   Blood: x / Protein: x / Nitrite: x   Leuk Esterase: x / RBC: x / WBC x   Sq Epi: x / Non Sq Epi: x / Bacteria: x        RADIOLOGY & ADDITIONAL TESTS:    Imaging Personally Reviewed:    Care Discussed with Consultants/Other Providers:  
[facility] Division of Hospital Medicine  Garrett Dunn MD  Pager (M-F, 8A-5P): [phone]  Other Times:  [phone]    Patient is a 57y old  Male who presents with a chief complaint of groin pain (16 Jul 2023 16:08)    SUBJECTIVE / OVERNIGHT EVENTS:  Patient offers no new complaints. No F/C, N/V, CP, SOB, Cough, lightheadedness, dizziness, abdominal pain, diarrhea, dysuria.  C/o scrotal edema.    MEDICATIONS  (STANDING):  atorvastatin 40 milliGRAM(s) Oral at bedtime  budesonide  80 MICROgram(s)/formoterol 4.5 MICROgram(s) Inhaler 2 Puff(s) Inhalation two times a day  chlorhexidine 2% Cloths 1 Application(s) Topical daily  dextrose 5%. 1000 milliLiter(s) (100 mL/Hr) IV Continuous <Continuous>  dextrose 5%. 1000 milliLiter(s) (50 mL/Hr) IV Continuous <Continuous>  dextrose 50% Injectable 25 Gram(s) IV Push once  dextrose 50% Injectable 12.5 Gram(s) IV Push once  dextrose 50% Injectable 25 Gram(s) IV Push once  diltiazem    milliGRAM(s) Oral daily  enoxaparin Injectable 40 milliGRAM(s) SubCutaneous every 24 hours  glucagon  Injectable 1 milliGRAM(s) IntraMuscular once  insulin lispro (ADMELOG) corrective regimen sliding scale   SubCutaneous three times a day before meals  insulin lispro (ADMELOG) corrective regimen sliding scale   SubCutaneous at bedtime  levothyroxine 50 MICROGram(s) Oral daily  mupirocin 2% Ointment 1 Application(s) Topical two times a day  tiotropium 2.5 MICROgram(s) Inhaler 2 Puff(s) Inhalation daily  vancomycin  IVPB 1250 milliGRAM(s) IV Intermittent every 12 hours    MEDICATIONS  (PRN):  acetaminophen     Tablet .. 650 milliGRAM(s) Oral every 6 hours PRN Temp greater or equal to 38C (100.4F), Mild Pain (1 - 3)  albuterol    90 MICROgram(s) HFA Inhaler 2 Puff(s) Inhalation every 6 hours PRN Shortness of Breath and/or Wheezing  dextrose Oral Gel 15 Gram(s) Oral once PRN Blood Glucose LESS THAN 70 milliGRAM(s)/deciliter  melatonin 3 milliGRAM(s) Oral at bedtime PRN Insomnia      Vital Signs Last 24 Hrs  T(C): 36.4 (17 Jul 2023 12:00), Max: 36.8 (16 Jul 2023 20:06)  T(F): 97.6 (17 Jul 2023 12:00), Max: 98.2 (16 Jul 2023 20:06)  HR: 64 (17 Jul 2023 12:00) (57 - 72)  BP: 146/90 (17 Jul 2023 12:00) (133/78 - 146/90)  BP(mean): --  RR: 18 (17 Jul 2023 12:00) (18 - 18)  SpO2: 98% (17 Jul 2023 12:00) (96% - 98%)    Parameters below as of 17 Jul 2023 12:00  Patient On (Oxygen Delivery Method): room air      CAPILLARY BLOOD GLUCOSE      POCT Blood Glucose.: 97 mg/dL (17 Jul 2023 12:23)  POCT Blood Glucose.: 99 mg/dL (17 Jul 2023 08:49)  POCT Blood Glucose.: 141 mg/dL (16 Jul 2023 21:31)  POCT Blood Glucose.: 100 mg/dL (16 Jul 2023 17:27)    I&O's Summary      PHYSICAL EXAM:  CONSTITUTIONAL: NAD, well-developed, well-groomed  EYES: PERRLA; conjunctiva and sclera clear  ENMT: Moist oral mucosa, no pharyngeal injection or exudates; normal dentition  NECK: Supple, no palpable masses; no thyromegaly  RESPIRATORY: Normal respiratory effort; lungs are clear to auscultation bilaterally  CARDIOVASCULAR: Regular rate and rhythm, normal S1 and S2, no murmur/rub/gallop; No lower extremity edema; Peripheral pulses are 2+ bilaterally  ABDOMEN: Nontender to palpation, normoactive bowel sounds, no rebound/guarding; No hepatosplenomegaly  MUSCULOSKELETAL:  Normal gait; no clubbing or cyanosis of digits; no joint swelling or tenderness to palpation  PSYCH: A+O to person, place, and time; affect appropriate  NEUROLOGY: CN 2-12 are intact and symmetric; no gross sensory deficits   SKIN: No rashes; no palpable lesions    LABS:                        12.1   6.57  )-----------( 198      ( 16 Jul 2023 03:00 )             37.1     07-16    130<L>  |  96<L>  |  9   ----------------------------<  85  4.3   |  22  |  0.80    Ca    9.4      16 Jul 2023 03:00  Phos  3.9     07-16  Mg     2.10     07-16    TPro  6.5  /  Alb  3.7  /  TBili  0.2  /  DBili  x   /  AST  16  /  ALT  14  /  AlkPhos  119  07-16          Urinalysis Basic - ( 16 Jul 2023 03:00 )    Color: x / Appearance: x / SG: x / pH: x  Gluc: 85 mg/dL / Ketone: x  / Bili: x / Urobili: x   Blood: x / Protein: x / Nitrite: x   Leuk Esterase: x / RBC: x / WBC x   Sq Epi: x / Non Sq Epi: x / Bacteria: x        RADIOLOGY & ADDITIONAL TESTS:    Imaging Personally Reviewed:    Care Discussed with Consultants/Other Providers:  
[facility] Division of Hospital Medicine  Garrett Dunn MD  Pager (M-F, 8A-5P): [phone]  Other Times:  [phone]    Patient is a 57y old  Male who presents with a chief complaint of groin pain (18 Jul 2023 14:29)    SUBJECTIVE / OVERNIGHT EVENTS:  This AM, no new complaints. No F/C, N/V, CP, SOB, Cough, lightheadedness, dizziness, abdominal pain, diarrhea, dysuria.    MEDICATIONS  (STANDING):  atorvastatin 40 milliGRAM(s) Oral at bedtime  budesonide  80 MICROgram(s)/formoterol 4.5 MICROgram(s) Inhaler 2 Puff(s) Inhalation two times a day  chlorhexidine 2% Cloths 1 Application(s) Topical daily  dextrose 5%. 1000 milliLiter(s) (50 mL/Hr) IV Continuous <Continuous>  dextrose 5%. 1000 milliLiter(s) (100 mL/Hr) IV Continuous <Continuous>  dextrose 50% Injectable 12.5 Gram(s) IV Push once  dextrose 50% Injectable 25 Gram(s) IV Push once  dextrose 50% Injectable 25 Gram(s) IV Push once  diltiazem    milliGRAM(s) Oral daily  enoxaparin Injectable 40 milliGRAM(s) SubCutaneous every 24 hours  glucagon  Injectable 1 milliGRAM(s) IntraMuscular once  insulin lispro (ADMELOG) corrective regimen sliding scale   SubCutaneous three times a day before meals  insulin lispro (ADMELOG) corrective regimen sliding scale   SubCutaneous at bedtime  levothyroxine 50 MICROGram(s) Oral daily  mupirocin 2% Ointment 1 Application(s) Topical two times a day  tiotropium 2.5 MICROgram(s) Inhaler 2 Puff(s) Inhalation daily  vancomycin  IVPB 1250 milliGRAM(s) IV Intermittent every 12 hours  witch hazel Pads 1 Application(s) Topical daily    MEDICATIONS  (PRN):  acetaminophen     Tablet .. 650 milliGRAM(s) Oral every 6 hours PRN Temp greater or equal to 38C (100.4F), Mild Pain (1 - 3)  albuterol    90 MICROgram(s) HFA Inhaler 2 Puff(s) Inhalation every 6 hours PRN Shortness of Breath and/or Wheezing  dextrose Oral Gel 15 Gram(s) Oral once PRN Blood Glucose LESS THAN 70 milliGRAM(s)/deciliter  melatonin 3 milliGRAM(s) Oral at bedtime PRN Insomnia      Vital Signs Last 24 Hrs  T(C): 36.5 (19 Jul 2023 06:14), Max: 36.5 (19 Jul 2023 06:14)  T(F): 97.7 (19 Jul 2023 06:14), Max: 97.7 (19 Jul 2023 06:14)  HR: 63 (19 Jul 2023 06:14) (60 - 63)  BP: 141/70 (19 Jul 2023 06:14) (140/78 - 141/85)  BP(mean): --  RR: 18 (19 Jul 2023 06:14) (18 - 18)  SpO2: 100% (19 Jul 2023 06:14) (98% - 100%)    Parameters below as of 19 Jul 2023 06:14  Patient On (Oxygen Delivery Method): room air      CAPILLARY BLOOD GLUCOSE      POCT Blood Glucose.: 103 mg/dL (19 Jul 2023 08:29)  POCT Blood Glucose.: 141 mg/dL (18 Jul 2023 21:18)  POCT Blood Glucose.: 114 mg/dL (18 Jul 2023 17:23)  POCT Blood Glucose.: 101 mg/dL (18 Jul 2023 12:22)    I&O's Summary    18 Jul 2023 07:01  -  19 Jul 2023 07:00  --------------------------------------------------------  IN: 250 mL / OUT: 750 mL / NET: -500 mL        PHYSICAL EXAM:  CONSTITUTIONAL: NAD  EYES: PERRLA; conjunctiva and sclera clear  ENMT: Moist oral mucosa, no pharyngeal injection or exudates; normal dentition  NECK: Supple, no palpable masses; no thyromegaly  RESPIRATORY: Normal respiratory effort; lungs are clear to auscultation bilaterally  CARDIOVASCULAR: Regular rate and rhythm, normal S1 and S2, no murmur/rub/gallop; No lower extremity edema; Peripheral pulses are 2+ bilaterally  ABDOMEN: Nontender to palpation, normoactive bowel sounds, no rebound/guarding; No hepatosplenomegaly; scrotal edema  MUSCULOSKELETAL:  Normal gait; no clubbing or cyanosis of digits; no joint swelling or tenderness to palpation  PSYCH: A+O to person, place, and time; affect appropriate  NEUROLOGY: CN 2-12 are intact and symmetric; no gross sensory deficits   SKIN: dressing C/D/I    LABS:                        13.1   6.80  )-----------( 228      ( 19 Jul 2023 04:00 )             39.8     07-19    134<L>  |  98  |  9   ----------------------------<  95  4.0   |  22  |  0.82    Ca    9.8      19 Jul 2023 04:00  Phos  4.1     07-19  Mg     1.90     07-19            Urinalysis Basic - ( 19 Jul 2023 04:00 )    Color: x / Appearance: x / SG: x / pH: x  Gluc: 95 mg/dL / Ketone: x  / Bili: x / Urobili: x   Blood: x / Protein: x / Nitrite: x   Leuk Esterase: x / RBC: x / WBC x   Sq Epi: x / Non Sq Epi: x / Bacteria: x        RADIOLOGY & ADDITIONAL TESTS:    Imaging Personally Reviewed:    Care Discussed with Consultants/Other Providers:  
[facility] Division of Hospital Medicine  Garrett Dunn MD  Pager (M-F, 8A-5P): [phone]  Other Times:  [phone]    Patient is a 57y old  Male who presents with a chief complaint of groin pain (19 Jul 2023 11:48)    SUBJECTIVE / OVERNIGHT EVENTS:  Patient offers no new complaints. No F/C, N/V, CP, SOB, Cough, lightheadedness, dizziness, abdominal pain, diarrhea, dysuria.    MEDICATIONS  (STANDING):  atorvastatin 40 milliGRAM(s) Oral at bedtime  budesonide  80 MICROgram(s)/formoterol 4.5 MICROgram(s) Inhaler 2 Puff(s) Inhalation two times a day  chlorhexidine 2% Cloths 1 Application(s) Topical daily  dextrose 5%. 1000 milliLiter(s) (50 mL/Hr) IV Continuous <Continuous>  dextrose 5%. 1000 milliLiter(s) (100 mL/Hr) IV Continuous <Continuous>  dextrose 50% Injectable 25 Gram(s) IV Push once  dextrose 50% Injectable 25 Gram(s) IV Push once  dextrose 50% Injectable 12.5 Gram(s) IV Push once  diltiazem    milliGRAM(s) Oral daily  enoxaparin Injectable 40 milliGRAM(s) SubCutaneous every 24 hours  glucagon  Injectable 1 milliGRAM(s) IntraMuscular once  insulin lispro (ADMELOG) corrective regimen sliding scale   SubCutaneous three times a day before meals  insulin lispro (ADMELOG) corrective regimen sliding scale   SubCutaneous at bedtime  levothyroxine 50 MICROGram(s) Oral daily  mupirocin 2% Ointment 1 Application(s) Topical two times a day  nicotine - Inhaler 1 Each Inhalation two times a day  tiotropium 2.5 MICROgram(s) Inhaler 2 Puff(s) Inhalation daily  vancomycin  IVPB 1250 milliGRAM(s) IV Intermittent every 12 hours  witch hazel Pads 1 Application(s) Topical daily    MEDICATIONS  (PRN):  acetaminophen     Tablet .. 650 milliGRAM(s) Oral every 6 hours PRN Temp greater or equal to 38C (100.4F), Mild Pain (1 - 3)  albuterol    90 MICROgram(s) HFA Inhaler 2 Puff(s) Inhalation every 6 hours PRN Shortness of Breath and/or Wheezing  dextrose Oral Gel 15 Gram(s) Oral once PRN Blood Glucose LESS THAN 70 milliGRAM(s)/deciliter  melatonin 3 milliGRAM(s) Oral at bedtime PRN Insomnia      Vital Signs Last 24 Hrs  T(C): 36.6 (20 Jul 2023 12:18), Max: 36.6 (20 Jul 2023 12:18)  T(F): 97.8 (20 Jul 2023 12:18), Max: 97.8 (20 Jul 2023 12:18)  HR: 57 (20 Jul 2023 12:18) (57 - 72)  BP: 131/62 (20 Jul 2023 12:18) (131/62 - 135/87)  BP(mean): --  RR: 18 (20 Jul 2023 12:18) (18 - 18)  SpO2: 98% (20 Jul 2023 12:18) (95% - 98%)    Parameters below as of 20 Jul 2023 12:18  Patient On (Oxygen Delivery Method): room air      CAPILLARY BLOOD GLUCOSE      POCT Blood Glucose.: 114 mg/dL (20 Jul 2023 08:47)  POCT Blood Glucose.: 115 mg/dL (19 Jul 2023 21:13)  POCT Blood Glucose.: 88 mg/dL (19 Jul 2023 17:43)    I&O's Summary      PHYSICAL EXAM:  CONSTITUTIONAL: NAD  EYES: PERRLA; conjunctiva and sclera clear  ENMT: Moist oral mucosa, no pharyngeal injection or exudates; normal dentition  NECK: Supple, no palpable masses; no thyromegaly  RESPIRATORY: Normal respiratory effort; lungs are clear to auscultation bilaterally  CARDIOVASCULAR: Regular rate and rhythm, normal S1 and S2, no murmur/rub/gallop; No lower extremity edema; Peripheral pulses are 2+ bilaterally  ABDOMEN: Nontender to palpation, normoactive bowel sounds, no rebound/guarding; No hepatosplenomegaly; scrotal edema  MUSCULOSKELETAL:  Normal gait; no clubbing or cyanosis of digits; no joint swelling or tenderness to palpation  PSYCH: A+O to person, place, and time; affect appropriate  NEUROLOGY: CN 2-12 are intact and symmetric; no gross sensory deficits   SKIN: dressing C/D/I    LABS:                        12.5   7.46  )-----------( 245      ( 20 Jul 2023 05:30 )             38.0     07-20    134<L>  |  100  |  11  ----------------------------<  92  4.4   |  22  |  0.89    Ca    10.1      20 Jul 2023 05:30  Phos  5.0     07-20  Mg     2.00     07-20            Urinalysis Basic - ( 20 Jul 2023 05:30 )    Color: x / Appearance: x / SG: x / pH: x  Gluc: 92 mg/dL / Ketone: x  / Bili: x / Urobili: x   Blood: x / Protein: x / Nitrite: x   Leuk Esterase: x / RBC: x / WBC x   Sq Epi: x / Non Sq Epi: x / Bacteria: x        RADIOLOGY & ADDITIONAL TESTS:    Imaging Personally Reviewed:    Care Discussed with Consultants/Other Providers:

## 2023-07-21 NOTE — DISCHARGE NOTE NURSING/CASE MANAGEMENT/SOCIAL WORK - PATIENT PORTAL LINK FT
You can access the FollowMyHealth Patient Portal offered by [facility] by registering at the following website: [email or web address]. By joining [employer]’s FollowMyHealth portal, you will also be able to view your health information using other applications (apps) compatible with our system.

## 2023-07-21 NOTE — PROGRESS NOTE ADULT - ASSESSMENT
57M Schizoaffective D/O, CVID/hypogammaglobulinemia – IVIG qmonth, HTN, DM2, recent Lt gluteal MRSA abscess/cellulitis w/ MRSA bacteremia s/p Debridement 6/2023 p/w Lt perineal c/f abscess – failed outpt Bactrim tx c/b hyponatremia.
58 yo male pt with PMHx Schizoaffective D/O (Bipolar Type), CVID/ Hypogammaglobulinemia (monthly IVIG - last 7/6/2023), HTN, T2DM (on metformin), prior history of left gluteal wound requiring debridement in 6/2023 , COPD, hx of MRSA presented to ED with groin pain for 5 days. Patient states groin pain and swelling started 5 days ago and have progressively worsened.   Admitted for abscess 
57M Schizoaffective D/O, CVID/hypogammaglobulinemia – IVIG qmonth, HTN, DM2, recent Lt gluteal MRSA abscess/cellulitis w/ MRSA bacteremia s/p Debridement 6/2023 p/w Lt perineal c/f abscess – failed outpt Bactrim tx c/b hyponatremia.
57yMale PMHx Bipolar disorder,  Schizophrenia, COPD, CVID on immunoglobulin therapy monthly present with R perineal/scrotal fluid collection c/f cellulitis vs. abscess.     Plan:  - No acute surgical intervention  - Site is draining spontaneously  - c/w with IV abx   - Defer to urology for futher necessary I&D   - General surgery to s/o, reconsult PRN       B team surgery   [id number] 
57M Schizoaffective D/O, CVID/hypogammaglobulinemia – IVIG qmonth, HTN, DM2, recent Lt gluteal MRSA abscess/cellulitis w/ MRSA bacteremia s/p Debridement 6/2023 p/w Lt perineal c/f abscess – failed outpt Bactrim tx c/b hyponatremia.

## 2023-07-21 NOTE — PROGRESS NOTE ADULT - PROBLEM SELECTOR PLAN 8
Hold all PO meds   Goal -180    on ISS  Monitor FSG and adjust insulin coverage accordingly   CC diet
Hold all PO meds   FS acceptable.    on ISS  Monitor FSG and adjust insulin coverage accordingly   CC diet
Hold all PO meds   Goal -180    on ISS  Monitor FSG and adjust insulin coverage accordingly   CC diet

## 2023-07-21 NOTE — PROGRESS NOTE ADULT - PROBLEM SELECTOR PLAN 4
CT A/P showed: Diffuse scrotal edema right greater than left    Discussed with Urology by prior attending and recommend Scrotal support and no acute intervention at this time

## 2023-07-21 NOTE — DISCHARGE NOTE NURSING/CASE MANAGEMENT/SOCIAL WORK - NSDCPEFALRISK_GEN_ALL_CORE
For information on Fall & Injury Prevention, visit: [email or web address] OR  [email or web address] OR  https://www.cdc.gov/steadi/patient.html

## 2023-07-21 NOTE — PROGRESS NOTE ADULT - PROBLEM SELECTOR PLAN 9
continue Levothyroxine   TSH =2

## 2023-07-21 NOTE — PROGRESS NOTE ADULT - PROBLEM SELECTOR PLAN 5
hx of CVID and currently on  Gammagard IVIG infusion. Currently on 70 gm every 4 weeks (500 mg/kg)   Last dose 7/6/2023     Follows up with Dr.Mitchell Boxer outpt   Continue to monitor

## 2023-07-21 NOTE — PROGRESS NOTE ADULT - PROBLEM SELECTOR PLAN 1
chronic issue - noted debridement in 5/10/2023 by wound care  - collection present at that time c/b MRSA bacteremia - treated with 1 month of IV Vanco  - Awaiting repeat eval by wound care today  - if collection still present, will consider calling IR consult for collection drainage for source control  - BCx NGTD during this admission  - since no bacteremia, will treat for 7-10 days as severe cellulitis.
pt p/w groin pain that stared 5 days ago and completed Bactrim yesterday (3 day course)\  Previously admitted in 6/2023 for buttock cellulitis and underwent drainage.     CT A/P showed: Diffuse scrotal edema right greater than left. In the superficial perineal region there is a fluid collection measuring approximately 2.5 x 1.5 cm (2-139) with associated adjacent subcutaneous edema and no peripheral enhancement. Mildly enlarged prostate.   s/p 1 dose of IV Vanc and Clinda , no leukocytosis, remains afebrile. does not meet sepsis criteria at this time   lactate 1.0     Continue IV Vancomycin  Urology reportedly consulted (no note) c recommend elevating scrotum and no further interventions, no I&D needed at this time.   Gen surgery consulted and no acute interventions needed at this time   Follow up blood cultures  Monitor VS and WBC  If not improving, consider reconsulting urology.
chronic issue - noted debridement in 5/10/2023 by wound care  - collection present at that time c/b MRSA bacteremia - treated with 1 month of IV Vanco  - spontaneous self-drainage of the abscess on 7/17  - BCx NGTD during this admission  - since no bacteremia, will treat for 7 days until 7/21.  - anticipate discharge to home on 7/21.
chronic issue - noted debridement in 5/10/2023 by wound care  - collection present at that time c/b MRSA bacteremia - treated with 1 month of IV Vanco  - Awaiting repeat eval by wound care today  - if collection still present, will consider calling IR consult for collection drainage for source control  - BCx NGTD during this admission  - since no bacteremia, will treat for 7 days until 7/21.
chronic issue - noted debridement in 5/10/2023 by wound care  - collection present at that time c/b MRSA bacteremia - treated with 1 month of IV Vanco  - Awaiting repeat eval by wound care today  - if collection still present, will consider calling IR consult for collection drainage for source control  - BCx NGTD during this admission
chronic issue - noted debridement in 5/10/2023 by wound care  - collection present at that time c/b MRSA bacteremia - treated with 1 month of IV Vanco  - spontaneous self-drainage of the abscess on 7/17  - BCx NGTD during this admission  - since no bacteremia, will treat for 7 days until 7/21.  - discharge to home on 7/21.
[phone]

## 2023-07-21 NOTE — PROGRESS NOTE ADULT - PROBLEM SELECTOR PROBLEM 5
CVID (common variable immunodeficiency)

## 2023-07-21 NOTE — PROGRESS NOTE ADULT - PROBLEM SELECTOR PROBLEM 4
Scrotal swelling

## 2023-07-21 NOTE — PROGRESS NOTE ADULT - PROBLEM SELECTOR PROBLEM 1
Perineal abscess, superficial

## 2023-07-21 NOTE — PROGRESS NOTE ADULT - PROBLEM SELECTOR PLAN 6
states currently not on any medications, however, he was started on Abilify during last hospitalization    BH consult in am for medication   Currently calm and cooperative  Continue to monitor

## 2023-07-27 ENCOUNTER — EMERGENCY (EMERGENCY)
Facility: HOSPITAL | Age: 58
LOS: 1 days | Discharge: HOME | End: 2023-07-27
Attending: STUDENT IN AN ORGANIZED HEALTH CARE EDUCATION/TRAINING PROGRAM | Admitting: STUDENT IN AN ORGANIZED HEALTH CARE EDUCATION/TRAINING PROGRAM
Payer: MEDICARE

## 2023-07-27 ENCOUNTER — NON-APPOINTMENT (OUTPATIENT)
Age: 58
End: 2023-07-27

## 2023-07-27 VITALS
TEMPERATURE: 99 F | SYSTOLIC BLOOD PRESSURE: 128 MMHG | HEIGHT: 74 IN | RESPIRATION RATE: 16 BRPM | HEART RATE: 78 BPM | OXYGEN SATURATION: 100 % | DIASTOLIC BLOOD PRESSURE: 73 MMHG

## 2023-07-27 DIAGNOSIS — K08.199 COMPLETE LOSS OF TEETH DUE TO OTHER SPECIFIED CAUSE, UNSPECIFIED CLASS: Chronic | ICD-10-CM

## 2023-07-27 DIAGNOSIS — J34.2 DEVIATED NASAL SEPTUM: Chronic | ICD-10-CM

## 2023-07-27 PROCEDURE — 99283 EMERGENCY DEPT VISIT LOW MDM: CPT | Mod: GC

## 2023-07-27 RX ADMIN — Medication 1 TABLET(S): at 18:48

## 2023-07-27 NOTE — ED PROVIDER NOTE - NSFOLLOWUPINSTRUCTIONS_ED_ALL_ED_FT
You were seen in the emergency department for swelling on your right buttock.  You were given a prescription for Bactrim for 5 days to treat this infection.  If you start to have increased swelling or the abscess seems to be moving towards your genitals or your rectum, then please come back into the emergency department.  If you start to have painful bowel movements then please return.  If you start to have fevers, chills, chest pain, shortness of breath, or feel that your infection is not improving after taking the antibiotics, then please return for an evaluation.  Please follow-up with your infectious disease doctor.

## 2023-07-27 NOTE — ED PROVIDER NOTE - PATIENT PORTAL LINK FT
You can access the FollowMyHealth Patient Portal offered by [facility] by registering at the following website: [email or web address]. By joining [employer]’s FollowMyHealth portal, you will also be able to view your health information using other applications (apps) compatible with our system.

## 2023-07-27 NOTE — ED PROVIDER NOTE - NSICDXPASTMEDICALHX_GEN_ALL_CORE_FT
PAST MEDICAL HISTORY:  Abscess of finger     Bipolar disorder     Chronic hyponatremia     CVID (common variable immunodeficiency)     DM (diabetes mellitus)     HTN (hypertension)     Hyponatremia     Smoker     Smoker

## 2023-07-27 NOTE — ED ADULT NURSE NOTE - OBJECTIVE STATEMENT
C/o "boil" to buttocks. Denies any fever/chills or drainage. Pt eval by Dr. Thibodeaux and Dr. Turpin. Bactrim given as ordered. Rates pain at 3/10 with touch but denies pain at rest.

## 2023-07-27 NOTE — ED PROVIDER NOTE - ATTENDING CONTRIBUTION TO CARE
57M h/o DM, CVID, schizophrenia, bipolar disorder recent admission for scrotal cellulitis, +MRSA p/w 3 days of R gluteal swelling. Pt recently admitted for scrotal cellulitis, MRSA+. Noted this swelling 3 days ago. Denies fever/chills. No pain with defecation. No rectal bleeding or purulent stool. Denies direct trauma.   Gen: nad  Abd: soft, ntnd  Skin: slight erythema and swelling 10 x 5 cm area over the right buttock, slight ttp. No extension to perianal anal area or rectum  MDM: 57M h/o DM, CVID, schizophrenia, bipolar disorder recent admission for scrotal cellulitis, +MRSA p/w 3 days of R gluteal swelling - denies pain with defecation or blood/purulence in stool - exam notable for firm collection to inner aspect of buttock, though no fluctuance, no extension to rectum - anticipate cellulitis with developing abscess. No indication for I&D at this time. Will treat with oral bactrim given h/o MRSA. Instructed to have ID follow-up with the next 2-3 days. Local wound care. Return instructions provided.

## 2023-07-27 NOTE — ED PROVIDER NOTE - IV ALTEPLASE ADMIN OUTSIDE HIDDEN
Quality 130: Documentation Of Current Medications In The Medical Record: Current Medications with Name, Dosage, Frequency, or Route not Documented, Reason not Given Quality 402: Tobacco Use And Help With Quitting Among Adolescents: Patient screened for tobacco and never smoked Detail Level: Detailed Quality 265: Biopsy Follow-Up: Biopsy results reviewed, communicated, tracked, and documented Additional Notes: Currently takes no medications show

## 2023-07-27 NOTE — ED PROVIDER NOTE - PHYSICAL EXAMINATION
Constitutional: Well nourished, well developed, appears stated age.   Eyes: No scleral icterus  HENT: Moist mucous membranes. No teeth. No posterior oropharyngeal erythema.   Neck: Supple. No goiter.   CV: RRR, no m/r/g. Well perfused extremities.   RESP: Lungs CTAB, normal respiratory effort  GI: Soft, non-tender, distended.   MSK: Moving all four extremities. No obvious deformity.   Skin: Warm, dry. There are multiple well-healed scars over the gluteal area.  There is a 10 x 5 cm area of induration over the right buttock.  The area of induration does not extend into the perianal anal area.  There is no extension of this into the scrotum or penis.  There is mild tenderness to palpation and fluctuance.  No overlying erythema or vesicles.  Neuro: Alert and oriented.   Psych: Appropriate mood and affect

## 2023-07-27 NOTE — ED ADULT TRIAGE NOTE - CCCP TRG CHIEF CMPLNT
Refill Authorization Note   Martin Mac  [facility], [facility] [place]    In 2 months [facility] [place]    [facility]                  [facility], [facility] [place]    In 2 months [facility] [place]    In 3 months [facility]                    ABscess

## 2023-07-27 NOTE — ED PROVIDER NOTE - CLINICAL SUMMARY MEDICAL DECISION MAKING FREE TEXT BOX
Carlos Eduardo: 57M h/o DM, CVID, schizophrenia, bipolar disorder recent admission for scrotal cellulitis, +MRSA p/w 3 days of R gluteal swelling - denies pain with defecation or blood/pururlence in stool - exam notable for firm collection to inner aspect of buttock, though no fluctuance, no extension to rectum - anticipate cellulitis with developing abscess. No indication for I&D at this time. Will treat with oral bactrim given h/o MRSA. Instructed to have ID follow-up with the next 2-3 days. Local wound care. Return instructions provided. 57-year-old male with history of diabetes mellitus on metformin C VID taking monthly IVIG, schizophrenia, bipolar disorder, presenting with right gluteal induration and swelling, consistent with early cellulitis.  No fluctuance on exam to suggest focal fluid collection or drainable abscess at this time.  Vital signs are stable.  Patient states that he frequently gets abscesses and that Bactrim antibiotic as an outpatient is usually an effective treatment for him.  He has an established infectious disease doctor that he follows with.  At this time the abscess does not appear to be tracking towards the perirectal area, or towards the genital area.  Discussed risks and benefits of antibiotic treatment as an outpatient with the patient, and he is comfortable with plan for Bactrim over 5 days.  He will follow-up with his infectious disease doctor.  We discussed indications for return such as fever and chills, growth of the abscess into perirectal or genital area.  All questions answered and concerns addressed.    Carlos Eduardo: 57M h/o DM, CVID, schizophrenia, bipolar disorder recent admission for scrotal cellulitis, +MRSA p/w 3 days of R gluteal swelling - denies pain with defecation or blood/pururlence in stool - exam notable for firm collection to inner aspect of buttock, though no fluctuance, no extension to rectum - anticipate cellulitis with developing abscess. No indication for I&D at this time. Will treat with oral bactrim given h/o MRSA. Instructed to have ID follow-up with the next 2-3 days. Local wound care. Return instructions provided.

## 2023-07-28 PROBLEM — F17.200 NICOTINE DEPENDENCE, UNSPECIFIED, UNCOMPLICATED: Chronic | Status: ACTIVE | Noted: 2023-07-15

## 2023-07-29 ENCOUNTER — INPATIENT (INPATIENT)
Facility: HOSPITAL | Age: 58
LOS: 11 days | Discharge: HOME HEALTH | End: 2023-08-10
Attending: INTERNAL MEDICINE | Admitting: INTERNAL MEDICINE
Payer: MEDICARE

## 2023-07-29 VITALS
TEMPERATURE: 98 F | DIASTOLIC BLOOD PRESSURE: 61 MMHG | SYSTOLIC BLOOD PRESSURE: 118 MMHG | RESPIRATION RATE: 22 BRPM | OXYGEN SATURATION: 96 % | HEIGHT: 74 IN | HEART RATE: 78 BPM

## 2023-07-29 DIAGNOSIS — J44.9 CHRONIC OBSTRUCTIVE PULMONARY DISEASE, UNSPECIFIED: ICD-10-CM

## 2023-07-29 DIAGNOSIS — L03.317 CELLULITIS OF BUTTOCK: ICD-10-CM

## 2023-07-29 DIAGNOSIS — E11.9 TYPE 2 DIABETES MELLITUS WITHOUT COMPLICATIONS: ICD-10-CM

## 2023-07-29 DIAGNOSIS — J34.2 DEVIATED NASAL SEPTUM: Chronic | ICD-10-CM

## 2023-07-29 DIAGNOSIS — F31.9 BIPOLAR DISORDER, UNSPECIFIED: ICD-10-CM

## 2023-07-29 DIAGNOSIS — Z29.9 ENCOUNTER FOR PROPHYLACTIC MEASURES, UNSPECIFIED: ICD-10-CM

## 2023-07-29 DIAGNOSIS — N17.9 ACUTE KIDNEY FAILURE, UNSPECIFIED: ICD-10-CM

## 2023-07-29 DIAGNOSIS — K08.199 COMPLETE LOSS OF TEETH DUE TO OTHER SPECIFIED CAUSE, UNSPECIFIED CLASS: Chronic | ICD-10-CM

## 2023-07-29 DIAGNOSIS — L03.90 CELLULITIS, UNSPECIFIED: ICD-10-CM

## 2023-07-29 DIAGNOSIS — I10 ESSENTIAL (PRIMARY) HYPERTENSION: ICD-10-CM

## 2023-07-29 DIAGNOSIS — E87.1 HYPO-OSMOLALITY AND HYPONATREMIA: ICD-10-CM

## 2023-07-29 LAB
ALBUMIN SERPL ELPH-MCNC: 3.6 G/DL — SIGNIFICANT CHANGE UP (ref 3.3–5)
ALP SERPL-CCNC: 117 U/L — SIGNIFICANT CHANGE UP (ref 40–120)
ALT FLD-CCNC: 13 U/L — SIGNIFICANT CHANGE UP (ref 4–41)
ANION GAP SERPL CALC-SCNC: 19 MMOL/L — HIGH (ref 7–14)
APTT BLD: 24.8 SEC — SIGNIFICANT CHANGE UP (ref 24.5–35.6)
AST SERPL-CCNC: 19 U/L — SIGNIFICANT CHANGE UP (ref 4–40)
BASE EXCESS BLDV CALC-SCNC: 2.8 MMOL/L — SIGNIFICANT CHANGE UP (ref -2–3)
BASOPHILS # BLD AUTO: 0 K/UL — SIGNIFICANT CHANGE UP (ref 0–0.2)
BASOPHILS NFR BLD AUTO: 0 % — SIGNIFICANT CHANGE UP (ref 0–2)
BILIRUB SERPL-MCNC: 0.5 MG/DL — SIGNIFICANT CHANGE UP (ref 0.2–1.2)
BLOOD GAS VENOUS COMPREHENSIVE RESULT: SIGNIFICANT CHANGE UP
BUN SERPL-MCNC: 11 MG/DL — SIGNIFICANT CHANGE UP (ref 7–23)
CALCIUM SERPL-MCNC: 8.7 MG/DL — SIGNIFICANT CHANGE UP (ref 8.4–10.5)
CHLORIDE BLDV-SCNC: 86 MMOL/L — LOW (ref 96–108)
CHLORIDE SERPL-SCNC: 84 MMOL/L — LOW (ref 98–107)
CO2 BLDV-SCNC: 29.3 MMOL/L — HIGH (ref 22–26)
CO2 SERPL-SCNC: 21 MMOL/L — LOW (ref 22–31)
CREAT SERPL-MCNC: 1.47 MG/DL — HIGH (ref 0.5–1.3)
EGFR: 56 ML/MIN/1.73M2 — LOW
EOSINOPHIL # BLD AUTO: 0 K/UL — SIGNIFICANT CHANGE UP (ref 0–0.5)
EOSINOPHIL NFR BLD AUTO: 0 % — SIGNIFICANT CHANGE UP (ref 0–6)
GAS PNL BLDV: 119 MMOL/L — CRITICAL LOW (ref 136–145)
GAS PNL BLDV: SIGNIFICANT CHANGE UP
GIANT PLATELETS BLD QL SMEAR: PRESENT — SIGNIFICANT CHANGE UP
GLUCOSE BLDC GLUCOMTR-MCNC: 96 MG/DL — SIGNIFICANT CHANGE UP (ref 70–99)
GLUCOSE BLDV-MCNC: 81 MG/DL — SIGNIFICANT CHANGE UP (ref 70–99)
GLUCOSE SERPL-MCNC: 76 MG/DL — SIGNIFICANT CHANGE UP (ref 70–99)
HCO3 BLDV-SCNC: 28 MMOL/L — SIGNIFICANT CHANGE UP (ref 22–29)
HCT VFR BLD CALC: 33.9 % — LOW (ref 39–50)
HCT VFR BLDA CALC: 35 % — LOW (ref 39–51)
HGB BLD CALC-MCNC: 11.7 G/DL — LOW (ref 12.6–17.4)
HGB BLD-MCNC: 11.3 G/DL — LOW (ref 13–17)
HYPOCHROMIA BLD QL: SLIGHT — SIGNIFICANT CHANGE UP
IANC: 17.83 K/UL — HIGH (ref 1.8–7.4)
INR BLD: 1.47 RATIO — HIGH (ref 0.85–1.18)
LACTATE BLDV-MCNC: 1.9 MMOL/L — SIGNIFICANT CHANGE UP (ref 0.5–2)
LYMPHOCYTES # BLD AUTO: 0.53 K/UL — LOW (ref 1–3.3)
LYMPHOCYTES # BLD AUTO: 2.6 % — LOW (ref 13–44)
MCHC RBC-ENTMCNC: 27.4 PG — SIGNIFICANT CHANGE UP (ref 27–34)
MCHC RBC-ENTMCNC: 33.3 GM/DL — SIGNIFICANT CHANGE UP (ref 32–36)
MCV RBC AUTO: 82.1 FL — SIGNIFICANT CHANGE UP (ref 80–100)
MONOCYTES # BLD AUTO: 1.76 K/UL — HIGH (ref 0–0.9)
MONOCYTES NFR BLD AUTO: 8.7 % — SIGNIFICANT CHANGE UP (ref 2–14)
NEUTROPHILS # BLD AUTO: 17.98 K/UL — HIGH (ref 1.8–7.4)
NEUTROPHILS NFR BLD AUTO: 84.4 % — HIGH (ref 43–77)
NEUTS BAND # BLD: 4.3 % — SIGNIFICANT CHANGE UP (ref 0–6)
PCO2 BLDV: 44 MMHG — SIGNIFICANT CHANGE UP (ref 42–55)
PH BLDV: 7.41 — SIGNIFICANT CHANGE UP (ref 7.32–7.43)
PLAT MORPH BLD: NORMAL — SIGNIFICANT CHANGE UP
PLATELET # BLD AUTO: 160 K/UL — SIGNIFICANT CHANGE UP (ref 150–400)
PLATELET COUNT - ESTIMATE: NORMAL — SIGNIFICANT CHANGE UP
PO2 BLDV: 24 MMHG — LOW (ref 25–45)
POIKILOCYTOSIS BLD QL AUTO: SLIGHT — SIGNIFICANT CHANGE UP
POLYCHROMASIA BLD QL SMEAR: SLIGHT — SIGNIFICANT CHANGE UP
POTASSIUM BLDV-SCNC: 4 MMOL/L — SIGNIFICANT CHANGE UP (ref 3.5–5.1)
POTASSIUM SERPL-MCNC: 4.2 MMOL/L — SIGNIFICANT CHANGE UP (ref 3.5–5.3)
POTASSIUM SERPL-SCNC: 4.2 MMOL/L — SIGNIFICANT CHANGE UP (ref 3.5–5.3)
PROT SERPL-MCNC: 6.7 G/DL — SIGNIFICANT CHANGE UP (ref 6–8.3)
PROTHROM AB SERPL-ACNC: 16.3 SEC — HIGH (ref 9.5–13)
RBC # BLD: 4.13 M/UL — LOW (ref 4.2–5.8)
RBC # FLD: 14.8 % — HIGH (ref 10.3–14.5)
RBC BLD AUTO: ABNORMAL
SAO2 % BLDV: 35.7 % — LOW (ref 67–88)
SODIUM SERPL-SCNC: 124 MMOL/L — LOW (ref 135–145)
WBC # BLD: 20.27 K/UL — HIGH (ref 3.8–10.5)
WBC # FLD AUTO: 20.27 K/UL — HIGH (ref 3.8–10.5)

## 2023-07-29 PROCEDURE — 74177 CT ABD & PELVIS W/CONTRAST: CPT | Mod: 26,MA

## 2023-07-29 PROCEDURE — 99285 EMERGENCY DEPT VISIT HI MDM: CPT | Mod: GC

## 2023-07-29 PROCEDURE — 99223 1ST HOSP IP/OBS HIGH 75: CPT

## 2023-07-29 RX ORDER — INSULIN LISPRO 100/ML
VIAL (ML) SUBCUTANEOUS AT BEDTIME
Refills: 0 | Status: DISCONTINUED | OUTPATIENT
Start: 2023-07-29 | End: 2023-08-10

## 2023-07-29 RX ORDER — ACETAMINOPHEN 500 MG
650 TABLET ORAL EVERY 6 HOURS
Refills: 0 | Status: DISCONTINUED | OUTPATIENT
Start: 2023-07-29 | End: 2023-08-10

## 2023-07-29 RX ORDER — BUDESONIDE AND FORMOTEROL FUMARATE DIHYDRATE 160; 4.5 UG/1; UG/1
2 AEROSOL RESPIRATORY (INHALATION)
Refills: 0 | Status: DISCONTINUED | OUTPATIENT
Start: 2023-07-29 | End: 2023-08-10

## 2023-07-29 RX ORDER — TIOTROPIUM BROMIDE 18 UG/1
2 CAPSULE ORAL; RESPIRATORY (INHALATION) DAILY
Refills: 0 | Status: DISCONTINUED | OUTPATIENT
Start: 2023-07-29 | End: 2023-08-10

## 2023-07-29 RX ORDER — DEXTROSE 50 % IN WATER 50 %
25 SYRINGE (ML) INTRAVENOUS ONCE
Refills: 0 | Status: DISCONTINUED | OUTPATIENT
Start: 2023-07-29 | End: 2023-08-10

## 2023-07-29 RX ORDER — GLUCAGON INJECTION, SOLUTION 0.5 MG/.1ML
1 INJECTION, SOLUTION SUBCUTANEOUS ONCE
Refills: 0 | Status: DISCONTINUED | OUTPATIENT
Start: 2023-07-29 | End: 2023-08-10

## 2023-07-29 RX ORDER — ACETAMINOPHEN 500 MG
1000 TABLET ORAL ONCE
Refills: 0 | Status: COMPLETED | OUTPATIENT
Start: 2023-07-29 | End: 2023-07-29

## 2023-07-29 RX ORDER — DEXTROSE 50 % IN WATER 50 %
15 SYRINGE (ML) INTRAVENOUS ONCE
Refills: 0 | Status: DISCONTINUED | OUTPATIENT
Start: 2023-07-29 | End: 2023-08-10

## 2023-07-29 RX ORDER — INSULIN LISPRO 100/ML
VIAL (ML) SUBCUTANEOUS
Refills: 0 | Status: DISCONTINUED | OUTPATIENT
Start: 2023-07-29 | End: 2023-08-10

## 2023-07-29 RX ORDER — ASPIRIN/CALCIUM CARB/MAGNESIUM 324 MG
81 TABLET ORAL DAILY
Refills: 0 | Status: DISCONTINUED | OUTPATIENT
Start: 2023-07-29 | End: 2023-07-29

## 2023-07-29 RX ORDER — OXYCODONE HYDROCHLORIDE 5 MG/1
5 TABLET ORAL ONCE
Refills: 0 | Status: DISCONTINUED | OUTPATIENT
Start: 2023-07-29 | End: 2023-07-29

## 2023-07-29 RX ORDER — VANCOMYCIN HCL 1 G
1000 VIAL (EA) INTRAVENOUS ONCE
Refills: 0 | Status: COMPLETED | OUTPATIENT
Start: 2023-07-29 | End: 2023-07-29

## 2023-07-29 RX ORDER — ONDANSETRON 8 MG/1
4 TABLET, FILM COATED ORAL ONCE
Refills: 0 | Status: COMPLETED | OUTPATIENT
Start: 2023-07-29 | End: 2023-07-29

## 2023-07-29 RX ORDER — SODIUM CHLORIDE 9 MG/ML
1000 INJECTION, SOLUTION INTRAVENOUS
Refills: 0 | Status: DISCONTINUED | OUTPATIENT
Start: 2023-07-29 | End: 2023-08-09

## 2023-07-29 RX ORDER — LEVOTHYROXINE SODIUM 125 MCG
50 TABLET ORAL DAILY
Refills: 0 | Status: DISCONTINUED | OUTPATIENT
Start: 2023-07-29 | End: 2023-08-10

## 2023-07-29 RX ORDER — ACETAMINOPHEN 500 MG
975 TABLET ORAL ONCE
Refills: 0 | Status: DISCONTINUED | OUTPATIENT
Start: 2023-07-29 | End: 2023-07-29

## 2023-07-29 RX ORDER — VANCOMYCIN HCL 1 G
1750 VIAL (EA) INTRAVENOUS EVERY 12 HOURS
Refills: 0 | Status: COMPLETED | OUTPATIENT
Start: 2023-07-29 | End: 2023-08-05

## 2023-07-29 RX ORDER — ENOXAPARIN SODIUM 100 MG/ML
40 INJECTION SUBCUTANEOUS EVERY 24 HOURS
Refills: 0 | Status: DISCONTINUED | OUTPATIENT
Start: 2023-07-29 | End: 2023-07-29

## 2023-07-29 RX ORDER — CEFEPIME 1 G/1
1000 INJECTION, POWDER, FOR SOLUTION INTRAMUSCULAR; INTRAVENOUS ONCE
Refills: 0 | Status: COMPLETED | OUTPATIENT
Start: 2023-07-29 | End: 2023-07-29

## 2023-07-29 RX ORDER — ATORVASTATIN CALCIUM 80 MG/1
40 TABLET, FILM COATED ORAL AT BEDTIME
Refills: 0 | Status: DISCONTINUED | OUTPATIENT
Start: 2023-07-29 | End: 2023-08-10

## 2023-07-29 RX ORDER — SODIUM CHLORIDE 9 MG/ML
1000 INJECTION INTRAMUSCULAR; INTRAVENOUS; SUBCUTANEOUS ONCE
Refills: 0 | Status: COMPLETED | OUTPATIENT
Start: 2023-07-29 | End: 2023-07-29

## 2023-07-29 RX ADMIN — ONDANSETRON 4 MILLIGRAM(S): 8 TABLET, FILM COATED ORAL at 17:56

## 2023-07-29 RX ADMIN — Medication 250 MILLIGRAM(S): at 19:28

## 2023-07-29 RX ADMIN — SODIUM CHLORIDE 1000 MILLILITER(S): 9 INJECTION INTRAMUSCULAR; INTRAVENOUS; SUBCUTANEOUS at 17:56

## 2023-07-29 RX ADMIN — CEFEPIME 100 MILLIGRAM(S): 1 INJECTION, POWDER, FOR SOLUTION INTRAMUSCULAR; INTRAVENOUS at 18:53

## 2023-07-29 RX ADMIN — Medication 400 MILLIGRAM(S): at 17:56

## 2023-07-29 NOTE — H&P ADULT - PROBLEM SELECTOR PLAN 3
Cr 1.46, up from baseline of 0.8. Possibly ATN in setting of sepsis   - f/u urine studies  - monitor Cr  - avoid nephrotoxins, renally dose meds

## 2023-07-29 NOTE — ED PROVIDER NOTE - CLINICAL SUMMARY MEDICAL DECISION MAKING FREE TEXT BOX
56 yo M, hx of CVID, returns to ED after failing outpatient abx treatment-macrobid.  Concern for cellulitic infection, sepsis.  Will order CBC, CMP, lactate, fluids, cultures x 2.

## 2023-07-29 NOTE — ED PROVIDER NOTE - CONSTITUTIONAL, MLM
Well appearing, awake, alert, oriented to person, place, time/situation and in no mild distress. normal...

## 2023-07-29 NOTE — H&P ADULT - HISTORY OF PRESENT ILLNESS
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     In ED, vitals T 102.6, HR 78, /61, RR 22, O2 sat 96% on RA  Labs significant for: wbc 20.27, Hb 11.3, Na 124, BUN/Cr 11/1.47, .9  EKG personally reviewed  CXR:  Imaging: CT a/p: IMPRESSION:  Worsening subcutaneous edema in the right median gluteal fold with soft   tissue thickening and phlegmonous change in the perianal region and   thickening of the levator ani muscle. Gluteal region is excluded from the   image and there is regional beam hardening artifact related to patient   positioning. No definitive drainable fluid collection within the   visualized region of interest or subcutaneous gas.  ED management: IV vanc and cefepime, tylenol, zofran, 1 L NS Pt is a 57M PMHx Schizoaffective D/O (Bipolar Type), HTN, HLD, Hypothyroidism, Daily Smoker, CVID/ Hypogammaglobulinemia (monthly IVIG - last 7/6/2023), T2DM, COPD, recent Lt gluteal MRSA abscess/cellulitis w/ MRSA bacteremia s/p Debridement 6/2023 presenting with worsening right buttock cellulitis. Pt states symptoms started about 4 days ago, came to ED on 7/27 and discharged on bactrim, reports compliance. Pt states he was told to return if cellulitis worsened. He reports increased redness, edema, and pain around buttock with purulent drainage. He denies fever, chills, abd pain, chest pain, SOB, n/v/d, HA or urinary symptoms.     In ED, vitals T 102.6, HR 78, /61, RR 22, O2 sat 96% on RA  Labs significant for: wbc 20.27, Hb 11.3, Na 124, BUN/Cr 11/1.47, .9  EKG personally reviewed:  CXR:  Imaging: CT a/p: IMPRESSION:  Worsening subcutaneous edema in the right median gluteal fold with soft   tissue thickening and phlegmonous change in the perianal region and   thickening of the levator ani muscle. Gluteal region is excluded from the   image and there is regional beam hardening artifact related to patient   positioning. No definitive drainable fluid collection within the   visualized region of interest or subcutaneous gas.  ED management: IV vanc and cefepime, tylenol, zofran, 1 L NS

## 2023-07-29 NOTE — ED PROVIDER NOTE - PROGRESS NOTE DETAILS
rectal temp noted to be 102.2F Pt reassessed, vitals stable at this time, no acute complaints, agreeable to admission, Dr. Augustin turk. - Alissa Guevara, PGY-3

## 2023-07-29 NOTE — PATIENT PROFILE ADULT - FALL HARM RISK - UNIVERSAL INTERVENTIONS
Bed in lowest position, wheels locked, appropriate side rails in place/Call bell, personal items and telephone in reach/Instruct patient to call for assistance before getting out of bed or chair/Non-slip footwear when patient is out of bed/[place] to call system/Physically safe environment - no spills, clutter or unnecessary equipment/Purposeful Proactive Rounding/Room/bathroom lighting operational, light cord in reach

## 2023-07-29 NOTE — H&P ADULT - NSHPLABSRESULTS_GEN_ALL_CORE
.  LABS:                         11.3   20.27 )-----------( 160      ( 29 Jul 2023 17:00 )             33.9     07-29    124<L>  |  84<L>  |  11  ----------------------------<  76  4.2   |  21<L>  |  1.47<H>    Ca    8.7      29 Jul 2023 17:00    TPro  6.7  /  Alb  3.6  /  TBili  0.5  /  DBili  x   /  AST  19  /  ALT  13  /  AlkPhos  117  07-29    PT/INR - ( 29 Jul 2023 17:00 )   PT: 16.3 sec;   INR: 1.47 ratio         PTT - ( 29 Jul 2023 17:00 )  PTT:24.8 sec  Urinalysis Basic - ( 29 Jul 2023 17:00 )    Color: x / Appearance: x / SG: x / pH: x  Gluc: 76 mg/dL / Ketone: x  / Bili: x / Urobili: x   Blood: x / Protein: x / Nitrite: x   Leuk Esterase: x / RBC: x / WBC x   Sq Epi: x / Non Sq Epi: x / Bacteria: x                RADIOLOGY, EKG & ADDITIONAL TESTS: Reviewed.

## 2023-07-29 NOTE — ED ADULT NURSE NOTE - OBJECTIVE STATEMENT
Received pt to bed 10, A+Ox4, ambulatory. C/O right gluteal pain. site is noted to be red, and warm to touch, no drainage noted. Respirations even, pt tachypneic. ABD is soft, non tender, non distended.  20G to RAC, Labs sent, Medicated as per Provider orders, will continue to monitor.

## 2023-07-29 NOTE — ED PROVIDER NOTE - OBJECTIVE STATEMENT
58 yo M, hx of CVID, bipolar disorder, asthma, COPD, returns to ED for worsening skin infection.  Patient was seen at ED 2 days ago for cellulitis to right buttock.  Patient was discharged on Macrobid.  Returns today for worsening pain and infection.  Also notes chills and nausea.  No vomiting or known fever at home.  Patient has hx of skin infections in the past requiring admission.  No CP, SOB, abdominal pain, new/worsened SOB.

## 2023-07-29 NOTE — H&P ADULT - PROBLEM SELECTOR PLAN 2
Pt w/ hx of chronic hyponatremia. Na 124, previously due to polydipsia. Also recently on bactrim. S/p 1 L NS in ED  - will fluid restrict to 1.5 L for now given TASH  - stop bactrim  - f/u urine studies  - f/u AM Na

## 2023-07-29 NOTE — ED PROVIDER NOTE - SKIN, MLM
Skin normal color for race, warm, dry and intact. No evidence of rash. +large area of erythema to right buttock, indurated and warm Skin normal color for race, warm, dry and intact. No evidence of rash. +large area of erythema to right buttock, indurated and warm; no rectum involvement

## 2023-07-29 NOTE — ED ADULT TRIAGE NOTE - CHIEF COMPLAINT QUOTE
pt states he ws dx with "cellulitis of the butt", started on bactrim, pain worsening. denies fevers. pt having difficulty sitting. hx bipolar, htn, cvid, dm [id number]=[phone]

## 2023-07-29 NOTE — H&P ADULT - NSHPREVIEWOFSYSTEMS_GEN_ALL_CORE
REVIEW OF SYSTEMS:    CONSTITUTIONAL: No weakness, fevers or chills  EYES/ENT: No visual changes;  No vertigo or throat pain   NECK: No pain or stiffness  RESPIRATORY: No cough, wheezing, hemoptysis; No shortness of breath  CARDIOVASCULAR: No chest pain or palpitations  GASTROINTESTINAL: No abdominal or epigastric pain. No nausea, vomiting, or hematemesis; No diarrhea or constipation. No melena or hematochezia.  GENITOURINARY: No dysuria, frequency or hematuria  NEUROLOGICAL: No numbness or weakness  SKIN: No itching. + buttock redness and edema

## 2023-07-29 NOTE — H&P ADULT - NSHPPHYSICALEXAM_GEN_ALL_CORE
VITAL SIGNS:  T(C): 36.9 (07-29-23 @ 21:09), Max: 39.2 (07-29-23 @ 17:14)  T(F): 98.4 (07-29-23 @ 21:09), Max: 102.6 (07-29-23 @ 17:14)  HR: 62 (07-29-23 @ 21:09) (62 - 78)  BP: 107/39 (07-29-23 @ 21:09) (107/39 - 118/61)  BP(mean): --  RR: 18 (07-29-23 @ 21:09) (18 - 22)  SpO2: 97% (07-29-23 @ 21:09) (96% - 97%)  Wt(kg): --    PHYSICAL EXAM:    Constitutional: resting comfortably in bed; NAD  Head: NC/AT  Eyes: PERRL, EOMI, anicteric sclera  ENT: no nasal discharge; uvula midline, no oropharyngeal erythema or exudates; MMM  Neck: supple  Respiratory: CTA B/L; no W/R/R, no retractions  Cardiac: +S1/S2; RRR; no M/R/G  Gastrointestinal: abdomen soft, NT/ND; no rebound or guarding; +BSx4  Back: spine midline, no bony tenderness  Extremities: WWP, no clubbing or cyanosis; no peripheral edema  Musculoskeletal: NROM x4; no joint swelling, tenderness or erythema  Vascular: distal pulses intact  Dermatologic: skin warm, dry and intact; no rashes  Lymphatic: no submandibular or cervical LAD  Neurologic: AAOx3; moves all 4 extremities  Psychiatric: affect and characteristics of appearance, verbalizations, behaviors are appropriate VITAL SIGNS:  T(C): 36.9 (07-29-23 @ 21:09), Max: 39.2 (07-29-23 @ 17:14)  T(F): 98.4 (07-29-23 @ 21:09), Max: 102.6 (07-29-23 @ 17:14)  HR: 62 (07-29-23 @ 21:09) (62 - 78)  BP: 107/39 (07-29-23 @ 21:09) (107/39 - 118/61)  BP(mean): --  RR: 18 (07-29-23 @ 21:09) (18 - 22)  SpO2: 97% (07-29-23 @ 21:09) (96% - 97%)  Wt(kg): --    PHYSICAL EXAM:    Constitutional: resting comfortably in bed; NAD  Head: NC/AT  Eyes: PERRL, EOMI, anicteric sclera  ENT: no nasal discharge; uvula midline, no oropharyngeal erythema or exudates; MMM  Neck: supple  Respiratory: CTA B/L; no W/R/R, no retractions  Cardiac: +S1/S2; RRR; no M/R/G  Gastrointestinal: abdomen soft, NT/ND; no rebound or guarding; +BSx4  Back: spine midline, no bony tenderness  Extremities: WWP, no clubbing or cyanosis; no peripheral edema  Musculoskeletal: NROM x4; no joint swelling, tenderness or erythema  Vascular: distal pulses intact  Dermatologic: right buttock with erythema, edema, indurated, some purulent drainage.   Lymphatic: no submandibular or cervical LAD  Neurologic: AAOx3; moves all 4 extremities  Psychiatric: affect and characteristics of appearance, verbalizations, behaviors are appropriate

## 2023-07-29 NOTE — H&P ADULT - PROBLEM SELECTOR PLAN 1
Pt presenting with worsening right buttock cellulitis. CT a/p w/o evidence of any drainable collection. S/p vanc and cefepime in ED.  - c/w IV vanc  - f/u bcx

## 2023-07-29 NOTE — ED PROVIDER NOTE - ATTENDING CONTRIBUTION TO CARE
57M CVID asthma/COPD; returns to ED due to worsening cellulitis to R buttock, sent home on bactrim.  Returns due to pain worsening, says area is growing in size.  Denies fever at home, reports nausea here.  Decr PO.  No other c/o.  VS wnl.  Approx 12 x6 cm area of erythema warmth and tenderness to outer buttock extending to perirectal area but no swelling or mass appreciated within anal verge, no extension to perineum or scrotum, no crepitus or draining abscess.  Has had fluid collections there on last admission.  Septic here.  Rx IV abx, check labs and cultures, Check CT a/p eval for abscess.  Admit.  VS:  unremarkable    GEN - malaise, mild distress buttock pain, laying on side;   A+O x3   HEAD - NC/AT     ENT - PEERL, EOMI, mucous membranes  dry , no discharge      NECK: Neck supple, non-tender without lymphadenopathy, no masses, no JVD  PULM - CTA b/l,  symmetric breath sounds  COR -  normal heart sounds    ABD - , ND, NT, soft,  BACK - no CVA tenderness, nontender spine     Approx 12 x6 cm area of erythema warmth and tenderness to outer buttock extending to perirectal area but no swelling or mass appreciated within anal verge, no extension to perineum or scrotum, no crepitus or draining abscess.   EXTREMS - no edema, no deformity, warm and well perfused    SKIN - no rash    or bruising      NEUROLOGIC - alert, face symmetric, speech fluent, sensation nl, motor no focal deficit.

## 2023-07-29 NOTE — ED ADULT NURSE NOTE - CHIEF COMPLAINT QUOTE
pt states he ws dx with "cellulitis of the butt", started on bactrim, pain worsening. denies fevers. pt having difficulty sitting. hx bipolar, htn, cvid, dm sd=[phone]

## 2023-07-29 NOTE — H&P ADULT - ASSESSMENT
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 Pt found to be septic likely 2/2 buttock cellulitis. Pt admitted for further management.

## 2023-07-29 NOTE — ED ADULT NURSE REASSESSMENT NOTE - NS ED NURSE REASSESS COMMENT FT1
Pt received lying in bed breathing room air in no acute distress. A&OX4, respirations even and unlabored. Pt admitted, awaiting bed assignment. All safety maintained. NARENDRA Mays RN

## 2023-07-29 NOTE — PATIENT PROFILE ADULT - FUNCTIONAL ASSESSMENT - BASIC MOBILITY SECTION LABEL
Chart reviewed, met with pt at bedside. Pt plans discharge home, has family to assist. [address] offered and pt chose [facility] [phone] for follow up; referral placed with [facility]. Pt has RW for home. Care Management Interventions  PCP Verified by CM:  Yes  Transition of Care Consult (CM Consult): [address]: No  Reason Outside [place]: Physician referred to specific agency [facility]  Physical Therapy Consult: Yes  Occupational Therapy Consult: Yes  Current Support Network: Own Home  Confirm Follow Up Transport: Family  Plan discussed with Pt/Family/Caregiver: Yes  Freedom of Choice Offered: Yes  Discharge Location  Discharge Placement: Home with home health .

## 2023-07-30 LAB
ANION GAP SERPL CALC-SCNC: 16 MMOL/L — HIGH (ref 7–14)
APPEARANCE UR: CLEAR — SIGNIFICANT CHANGE UP
BACTERIA # UR AUTO: NEGATIVE /HPF — SIGNIFICANT CHANGE UP
BASOPHILS # BLD AUTO: 0.03 K/UL — SIGNIFICANT CHANGE UP (ref 0–0.2)
BASOPHILS NFR BLD AUTO: 0.2 % — SIGNIFICANT CHANGE UP (ref 0–2)
BILIRUB UR-MCNC: NEGATIVE — SIGNIFICANT CHANGE UP
BUN SERPL-MCNC: 12 MG/DL — SIGNIFICANT CHANGE UP (ref 7–23)
CALCIUM SERPL-MCNC: 8.6 MG/DL — SIGNIFICANT CHANGE UP (ref 8.4–10.5)
CAST: 0 /LPF — SIGNIFICANT CHANGE UP (ref 0–4)
CHLORIDE SERPL-SCNC: 91 MMOL/L — LOW (ref 98–107)
CO2 SERPL-SCNC: 20 MMOL/L — LOW (ref 22–31)
COLOR SPEC: YELLOW — SIGNIFICANT CHANGE UP
CREAT ?TM UR-MCNC: 116 MG/DL — SIGNIFICANT CHANGE UP
CREAT SERPL-MCNC: 1.22 MG/DL — SIGNIFICANT CHANGE UP (ref 0.5–1.3)
DIFF PNL FLD: NEGATIVE — SIGNIFICANT CHANGE UP
EGFR: 70 ML/MIN/1.73M2 — SIGNIFICANT CHANGE UP
EOSINOPHIL # BLD AUTO: 0.04 K/UL — SIGNIFICANT CHANGE UP (ref 0–0.5)
EOSINOPHIL NFR BLD AUTO: 0.3 % — SIGNIFICANT CHANGE UP (ref 0–6)
GLUCOSE BLDC GLUCOMTR-MCNC: 104 MG/DL — HIGH (ref 70–99)
GLUCOSE BLDC GLUCOMTR-MCNC: 119 MG/DL — HIGH (ref 70–99)
GLUCOSE BLDC GLUCOMTR-MCNC: 129 MG/DL — HIGH (ref 70–99)
GLUCOSE BLDC GLUCOMTR-MCNC: 181 MG/DL — HIGH (ref 70–99)
GLUCOSE SERPL-MCNC: 83 MG/DL — SIGNIFICANT CHANGE UP (ref 70–99)
GLUCOSE UR QL: NEGATIVE MG/DL — SIGNIFICANT CHANGE UP
HCT VFR BLD CALC: 32.8 % — LOW (ref 39–50)
HGB BLD-MCNC: 10.9 G/DL — LOW (ref 13–17)
IANC: 13.04 K/UL — HIGH (ref 1.8–7.4)
IMM GRANULOCYTES NFR BLD AUTO: 0.5 % — SIGNIFICANT CHANGE UP (ref 0–0.9)
KETONES UR-MCNC: NEGATIVE MG/DL — SIGNIFICANT CHANGE UP
LEUKOCYTE ESTERASE UR-ACNC: NEGATIVE — SIGNIFICANT CHANGE UP
LYMPHOCYTES # BLD AUTO: 0.69 K/UL — LOW (ref 1–3.3)
LYMPHOCYTES # BLD AUTO: 4.6 % — LOW (ref 13–44)
MAGNESIUM SERPL-MCNC: 1.7 MG/DL — SIGNIFICANT CHANGE UP (ref 1.6–2.6)
MCHC RBC-ENTMCNC: 27.5 PG — SIGNIFICANT CHANGE UP (ref 27–34)
MCHC RBC-ENTMCNC: 33.2 GM/DL — SIGNIFICANT CHANGE UP (ref 32–36)
MCV RBC AUTO: 82.6 FL — SIGNIFICANT CHANGE UP (ref 80–100)
MONOCYTES # BLD AUTO: 1.15 K/UL — HIGH (ref 0–0.9)
MONOCYTES NFR BLD AUTO: 7.7 % — SIGNIFICANT CHANGE UP (ref 2–14)
NEUTROPHILS # BLD AUTO: 13.04 K/UL — HIGH (ref 1.8–7.4)
NEUTROPHILS NFR BLD AUTO: 86.7 % — HIGH (ref 43–77)
NITRITE UR-MCNC: NEGATIVE — SIGNIFICANT CHANGE UP
NRBC # BLD: 0 /100 WBCS — SIGNIFICANT CHANGE UP (ref 0–0)
NRBC # FLD: 0 K/UL — SIGNIFICANT CHANGE UP (ref 0–0)
OSMOLALITY UR: 342 MOSM/KG — SIGNIFICANT CHANGE UP (ref 50–1200)
PH UR: 6.5 — SIGNIFICANT CHANGE UP (ref 5–8)
PHOSPHATE SERPL-MCNC: 3.2 MG/DL — SIGNIFICANT CHANGE UP (ref 2.5–4.5)
PLATELET # BLD AUTO: 158 K/UL — SIGNIFICANT CHANGE UP (ref 150–400)
POTASSIUM SERPL-MCNC: 3.8 MMOL/L — SIGNIFICANT CHANGE UP (ref 3.5–5.3)
POTASSIUM SERPL-SCNC: 3.8 MMOL/L — SIGNIFICANT CHANGE UP (ref 3.5–5.3)
PROT UR-MCNC: 30 MG/DL
RBC # BLD: 3.97 M/UL — LOW (ref 4.2–5.8)
RBC # FLD: 14.9 % — HIGH (ref 10.3–14.5)
RBC CASTS # UR COMP ASSIST: 7 /HPF — HIGH (ref 0–4)
REVIEW: SIGNIFICANT CHANGE UP
SODIUM SERPL-SCNC: 127 MMOL/L — LOW (ref 135–145)
SODIUM UR-SCNC: <20 MMOL/L — SIGNIFICANT CHANGE UP
SP GR SPEC: 1.02 — SIGNIFICANT CHANGE UP (ref 1–1.03)
SQUAMOUS # UR AUTO: 1 /HPF — SIGNIFICANT CHANGE UP (ref 0–5)
UROBILINOGEN FLD QL: 1 MG/DL — SIGNIFICANT CHANGE UP (ref 0.2–1)
WBC # BLD: 15.03 K/UL — HIGH (ref 3.8–10.5)
WBC # FLD AUTO: 15.03 K/UL — HIGH (ref 3.8–10.5)
WBC UR QL: 0 /HPF — SIGNIFICANT CHANGE UP (ref 0–5)

## 2023-07-30 PROCEDURE — 99222 1ST HOSP IP/OBS MODERATE 55: CPT

## 2023-07-30 PROCEDURE — 99233 SBSQ HOSP IP/OBS HIGH 50: CPT

## 2023-07-30 RX ORDER — HEPARIN SODIUM 5000 [USP'U]/ML
5000 INJECTION INTRAVENOUS; SUBCUTANEOUS EVERY 8 HOURS
Refills: 0 | Status: DISCONTINUED | OUTPATIENT
Start: 2023-07-30 | End: 2023-08-10

## 2023-07-30 RX ORDER — SODIUM CHLORIDE 9 MG/ML
1000 INJECTION INTRAMUSCULAR; INTRAVENOUS; SUBCUTANEOUS
Refills: 0 | Status: DISCONTINUED | OUTPATIENT
Start: 2023-07-30 | End: 2023-08-07

## 2023-07-30 RX ORDER — ENOXAPARIN SODIUM 100 MG/ML
40 INJECTION SUBCUTANEOUS EVERY 24 HOURS
Refills: 0 | Status: DISCONTINUED | OUTPATIENT
Start: 2023-07-30 | End: 2023-07-30

## 2023-07-30 RX ADMIN — Medication 50 MICROGRAM(S): at 06:58

## 2023-07-30 RX ADMIN — BUDESONIDE AND FORMOTEROL FUMARATE DIHYDRATE 2 PUFF(S): 160; 4.5 AEROSOL RESPIRATORY (INHALATION) at 10:12

## 2023-07-30 RX ADMIN — HEPARIN SODIUM 5000 UNIT(S): 5000 INJECTION INTRAVENOUS; SUBCUTANEOUS at 22:27

## 2023-07-30 RX ADMIN — ATORVASTATIN CALCIUM 40 MILLIGRAM(S): 80 TABLET, FILM COATED ORAL at 22:27

## 2023-07-30 RX ADMIN — Medication 250 MILLIGRAM(S): at 19:04

## 2023-07-30 RX ADMIN — Medication 1: at 12:36

## 2023-07-30 RX ADMIN — Medication 650 MILLIGRAM(S): at 19:56

## 2023-07-30 RX ADMIN — HEPARIN SODIUM 5000 UNIT(S): 5000 INJECTION INTRAVENOUS; SUBCUTANEOUS at 06:58

## 2023-07-30 RX ADMIN — SODIUM CHLORIDE 100 MILLILITER(S): 9 INJECTION INTRAMUSCULAR; INTRAVENOUS; SUBCUTANEOUS at 12:37

## 2023-07-30 RX ADMIN — TIOTROPIUM BROMIDE 2 PUFF(S): 18 CAPSULE ORAL; RESPIRATORY (INHALATION) at 10:11

## 2023-07-30 RX ADMIN — BUDESONIDE AND FORMOTEROL FUMARATE DIHYDRATE 2 PUFF(S): 160; 4.5 AEROSOL RESPIRATORY (INHALATION) at 22:24

## 2023-07-30 RX ADMIN — Medication 650 MILLIGRAM(S): at 11:12

## 2023-07-30 RX ADMIN — Medication 250 MILLIGRAM(S): at 06:58

## 2023-07-30 RX ADMIN — Medication 650 MILLIGRAM(S): at 19:02

## 2023-07-30 RX ADMIN — HEPARIN SODIUM 5000 UNIT(S): 5000 INJECTION INTRAVENOUS; SUBCUTANEOUS at 12:37

## 2023-07-30 RX ADMIN — Medication 650 MILLIGRAM(S): at 10:12

## 2023-07-30 NOTE — PROGRESS NOTE ADULT - PROBLEM SELECTOR PLAN 1
Patient presenting with worsening R buttock cellulitis. CT abd/pelvis w/o evidence of any drainable collection. S/p vanc and cefepime in ED.  - continue IV vanc 1750mg q12h x7d (first dose 7/29)  - f/u BCx Patient presenting with worsening buttock cellulitis. CT abd/pelvis w/o evidence of any drainable collection or subcutaneous gas. S/p vanc and cefepime in ED.  - continue IV vanc 1750mg q12h x7d (first dose 7/29)  - f/u BCx  - ID consult, appreciate recs Patient presenting with worsening buttock cellulitis. CT abd/pelvis w/o evidence of any drainable collection or subcutaneous gas. S/p vanc and cefepime in ED.  - continue IV vanc 1750mg q12h x7d (first dose 7/29)  - f/u BCx  - ID consult, appreciate recs  - Wound care consult

## 2023-07-30 NOTE — CONSULT NOTE ADULT - SUBJECTIVE AND OBJECTIVE BOX
Patient is a 57y old  Male who presents with a chief complaint of sepsis (2023 07:58)    HPI:  Pt is a 57M PMHx Schizoaffective D/O (Bipolar Type), HTN, HLD, Hypothyroidism, Daily Smoker, CVID/ Hypogammaglobulinemia (monthly IVIG - last 2023), T2DM, COPD, recent Lt gluteal MRSA abscess/cellulitis w/ MRSA bacteremia s/p Debridement 2023 presenting with worsening right buttock cellulitis. Pt states symptoms started about 4 days ago, came to ED on  and discharged on bactrim, reports compliance. Pt states he was told to return if cellulitis worsened. He reports increased redness, edema, and pain around buttock with purulent drainage. He denies fever, chills, abd pain, chest pain, SOB, n/v/d, HA or urinary symptoms.     In ED, vitals T 102.6, HR 78, /61, RR 22, O2 sat 96% on RA  Labs significant for: wbc 20.27, Hb 11.3, Na 124, BUN/Cr 11/1.47, .9  EKG personally reviewed:  CXR:  Imaging: CT a/p: IMPRESSION:  Worsening subcutaneous edema in the right median gluteal fold with soft   tissue thickening and phlegmonous change in the perianal region and   thickening of the levator ani muscle. Gluteal region is excluded from the   image and there is regional beam hardening artifact related to patient   positioning. No definitive drainable fluid collection within the   visualized region of interest or subcutaneous gas.  ED management: IV vanc and cefepime, tylenol, zofran, 1 L NS (2023 21:02)       REVIEW OF SYSTEMS        prior hospital charts reviewed [V]  primary team notes reviewed [V]  other consultant notes reviewed [V]    PAST MEDICAL & SURGICAL HISTORY:  Smoker      DM (diabetes mellitus)      HTN (hypertension)      Abscess of finger      Bipolar disorder      Chronic hyponatremia      CVID (common variable immunodeficiency)      Hyponatremia      Smoker      Deviated septum      Loss of teeth due to extraction  All teeth due to dental carries          SOCIAL HISTORY:      FAMILY HISTORY:  Family history of throat cancer (Father)    Family history of leukemia (Mother)        Allergies  penicillin (Rash)  amoxicillin (Fever)        ANTIMICROBIALS:  vancomycin  IVPB 1750 every 12 hours      ANTIMICROBIALS (past 90 days):  MEDICATIONS  (STANDING):  cefepime   IVPB   100 mL/Hr IV Intermittent (23 @ 18:53)    vancomycin  IVPB   250 mL/Hr IV Intermittent (23 @ 06:58)    vancomycin  IVPB.   250 mL/Hr IV Intermittent (23 @ 19:28)        OTHER MEDS:   MEDICATIONS  (STANDING):  acetaminophen     Tablet .. 650 every 6 hours PRN  atorvastatin 40 at bedtime  budesonide  80 MICROgram(s)/formoterol 4.5 MICROgram(s) Inhaler 2 two times a day  dextrose 50% Injectable 25 once  dextrose Oral Gel 15 once PRN  glucagon  Injectable 1 once  heparin   Injectable 5000 every 8 hours  insulin lispro (ADMELOG) corrective regimen sliding scale  three times a day before meals  insulin lispro (ADMELOG) corrective regimen sliding scale  at bedtime  levothyroxine 50 daily  tiotropium 2.5 MICROgram(s) Inhaler 2 daily      VITALS:  Vital Signs Last 24 Hrs  T(F): 98.2 (23 @ 06:22), Max: 102.6 (23 @ 17:14)    Vital Signs Last 24 Hrs  HR: 76 (23 @ 06:22) (62 - 78)  BP: 109/60 (23 @ 06:22) (107/39 - 118/61)  RR: 18 (23 @ 06:22)  SpO2: 96% (23 @ 06:22) (95% - 97%)  Wt(kg): --    EXAM:        Labs:                        10.9   15.03 )-----------( 158      ( 2023 06:59 )             32.8         127<L>  |  91<L>  |  12  ----------------------------<  83  3.8   |  20<L>  |  1.22    Ca    8.6      2023 06:59  Phos  3.2       Mg     1.70         TPro  6.7  /  Alb  3.6  /  TBili  0.5  /  DBili  x   /  AST  19  /  ALT  13  /  AlkPhos  117        WBC Trend:  WBC Count: 15.03 (23 @ 06:59)  WBC Count: 20.27 (23 @ 17:00)      Auto Neutrophil #: 13.04 K/uL (23 @ 06:59)  Auto Neutrophil #: 17.98 K/uL (23 @ 17:00)  Band Neutrophils %: 4.3 % (23 @ 17:00)  Auto Neutrophil #: 4.88 K/uL (23 @ 03:00)  Auto Neutrophil #: 4.66 K/uL (07-15-23 @ 20:00)      Creatine Trend:  Creatinine: 1.22 ()  Creatinine: 1.47 ()      Liver Biochemical Testing Trend:  Alanine Aminotransferase (ALT/SGPT): 13 ()  Alanine Aminotransferase (ALT/SGPT): 14 ()  Alanine Aminotransferase (ALT/SGPT): 15 (07-15)  Alanine Aminotransferase (ALT/SGPT): 18 ()  Alanine Aminotransferase (ALT/SGPT): 35 ()  Aspartate Aminotransferase (AST/SGOT): 19 (23 @ 17:00)  Aspartate Aminotransferase (AST/SGOT): 16 (23 @ 03:00)  Aspartate Aminotransferase (AST/SGOT): 17 (07-15-23 @ 20:00)  Aspartate Aminotransferase (AST/SGOT): 27 (23 @ 23:42)  Aspartate Aminotransferase (AST/SGOT): 16 (23 @ 07:30)  Bilirubin Total: 0.5 ()  Bilirubin Total: 0.2 ()  Bilirubin Total: <0.2 (07-15)  Bilirubin Total: 0.3 ()  Bilirubin Total, Serum: 0.4 ()      Trend LDH  03-15-23 @ 12:18  413<H>      Auto Eosinophil %: 0.3 % (23 @ 06:59)  Auto Eosinophil %: 0.0 % (23 @ 17:00)      Urinalysis Basic - ( 2023 07:15 )    Color: Yellow / Appearance: Clear / S.025 / pH: x  Gluc: x / Ketone: Negative mg/dL  / Bili: Negative / Urobili: 1.0 mg/dL   Blood: x / Protein: 30 mg/dL / Nitrite: Negative   Leuk Esterase: Negative / RBC: 7 /HPF / WBC 0 /HPF   Sq Epi: x / Non Sq Epi: 1 /HPF / Bacteria: Negative /HPF        MICROBIOLOGY:  Vancomycin Level, Trough: 9.7 ( @ 05:30)  Vancomycin Level, Random: 9.4 ( @ 04:00)  Vancomycin Level, Trough: 10.9 ( @ 04:00)  Vancomycin Level, Trough: 5.2 ( @ 04:45)    MRSA PCR Result.: Detected (07-15-23 @ 20:00)      Culture - Abscess with Gram Stain (collected 2023 18:56)  Source: .Abscess Right perineum  Final Report:    Few Methicillin Resistant Staphylococcus aureus  Organism: Methicillin resistant Staphylococcus aureus  Organism: Methicillin resistant Staphylococcus aureus    Sensitivities:      Method Type: HATTIE      -  Ampicillin/Sulbactam: R <=8/4      -  Cefazolin: R <=4      -  Clindamycin: R >4      -  Daptomycin: S 0.5      -  Erythromycin: R >4      -  Gentamicin: S <=1 Should not be used as monotherapy      -  Linezolid: S 2      -  Oxacillin: R >2      -  Penicillin: R >8      -  Rifampin: S <=1 Should not be used as monotherapy      -  Tetracycline: S <=1      -  Trimethoprim/Sulfamethoxazole: S <=0.5/9.5      -  Vancomycin: S 2    Culture - Blood (collected 15 Jul 2023 21:30)  Source: .Blood Blood-Peripheral  Final Report:    No growth at 5 days    Culture - Blood (collected 15 Jul 2023 21:00)  Source: .Blood Blood-Peripheral  Final Report:    No growth at 5 days    Culture - Urine (collected 2023 23:47)  Source: Clean Catch Clean Catch (Midstream)  Final Report:    <10,000 CFU/mL Normal Urogenital Sydni    Culture - Blood (collected 10 May 2023 15:21)  Source: .Blood Blood-Peripheral  Final Report:    No Growth Final    Culture - Blood (collected 10 May 2023 07:30)  Source: .Blood Blood-Peripheral  Final Report:    No Growth Final    Culture - Abscess with Gram Stain (collected 09 May 2023 10:50)  Source: .Abscess right buttock  Final Report:    Moderate Methicillin Resistant Staphylococcus aureus  Organism: Methicillin resistant Staphylococcus aureus  Organism: Methicillin resistant Staphylococcus aureus    Sensitivities:      Method Type: HATTIE      -  Ampicillin/Sulbactam: R 16/8      -  Cefazolin: R <=4      -  Clindamycin: R >4      -  Daptomycin: S 0.5      -  Erythromycin: R >4      -  Gentamicin: S <=1 Should not be used as monotherapy      -  Linezolid: S 1      -  Oxacillin: R >2      -  Penicillin: R >8      -  Rifampin: R >2 Should not be used as monotherapy      -  Tetracycline: R >8      -  Trimethoprim/Sulfamethoxazole: S <=0.5/9.5      -  Vancomycin: S 2    Culture - Blood (collected 08 May 2023 06:03)  Source: .Blood Blood-Peripheral  Final Report:    No Growth Final    Culture - Blood (collected 08 May 2023 05:55)  Source: .Blood Blood-Peripheral  Final Report:    No Growth Final    Culture - Urine (collected 06 May 2023 21:40)  Source: Clean Catch Clean Catch (Midstream)  Final Report:    No growth      HIV-1/2 Combo Result: Nonreact (23 @ 05:33)    COVID-19 PCR: NotDetec (23 @ 06:50)      C-Reactive Protein, Serum: 262.9 ()      Blood Gas Venous - Lactate: 1.9 ( @ 17:00)    A1C with Estimated Average Glucose Result: 5.2 % (23 @ 23:42)  A1C with Estimated Average Glucose Result: 6.3 % (23 @ 05:55)  A1C with Estimated Average Glucose Result: 6.3 % (23 @ 10:43)      RADIOLOGY:  imaging below personally reviewed    < from: CT Abdomen and Pelvis w/ IV Cont (23 @ 19:49) >  IMPRESSION:  Worsening subcutaneous edema in the right median gluteal fold with soft   tissue thickening and phlegmonous change in the perianal region and   thickening of the levator ani muscle. Gluteal region is excluded from the   image and there is regional beam hardening artifact related to patient   positioning. No definitive drainable fluid collection within the   visualized region of interest or subcutaneous gas.    < end of copied text >   Patient is a 57y old  Male who presents with a chief complaint of sepsis (2023 07:58)    HPI:  Pt is a 57M PMHx Schizoaffective D/O (Bipolar Type), HTN, HLD, Hypothyroidism, Daily Smoker, CVID/ Hypogammaglobulinemia (monthly IVIG - last 2023), T2DM, COPD, recent Lt gluteal MRSA abscess/cellulitis w/ MRSA bacteremia s/p Debridement 2023 presenting with worsening right buttock cellulitis. Pt states symptoms started about 4 days ago, came to ED on  and discharged on bactrim, reports compliance. Pt states he was told to return if cellulitis worsened. He reports increased redness, edema, and pain around buttock with purulent drainage. He denies fever, chills, abd pain, chest pain, SOB, n/v/d, HA or urinary symptoms.     In ED, vitals T 102.6, HR 78, /61, RR 22, O2 sat 96% on RA  Labs significant for: wbc 20.27, Hb 11.3, Na 124, BUN/Cr 11/1.47, .9  EKG personally reviewed:  CXR:  Imaging: CT a/p: IMPRESSION:  Worsening subcutaneous edema in the right median gluteal fold with soft   tissue thickening and phlegmonous change in the perianal region and   thickening of the levator ani muscle. Gluteal region is excluded from the   image and there is regional beam hardening artifact related to patient   positioning. No definitive drainable fluid collection within the   visualized region of interest or subcutaneous gas.  ED management: IV vanc and cefepime, tylenol, zofran, 1 L NS (2023 21:02)       REVIEW OF SYSTEMS    CONSTITUTIONAL: No weakness, fevers or chills  EYES/ENT: No visual changes;  No vertigo or throat pain   MOUTH: No oral lesion, moist  NECK: No pain or stiffness  RESPIRATORY: No cough, wheezing, hemoptysis; No shortness of breath  CARDIOVASCULAR: No chest pain or palpitations  GASTROINTESTINAL: No abdominal or epigastric pain. No nausea, vomiting, or hematemesis; No diarrhea or constipation. No melena or hematochezia.  GENITOURINARY: No dysuria, frequency or hematuria  NEUROLOGICAL: No numbness or weakness  SKIN: +Buttocks pain  PSYCH: no confusion or altered mental status        prior hospital charts reviewed [V]  primary team notes reviewed [V]  other consultant notes reviewed [V]    PAST MEDICAL & SURGICAL HISTORY:  Smoker      DM (diabetes mellitus)      HTN (hypertension)      Abscess of finger      Bipolar disorder      Chronic hyponatremia      CVID (common variable immunodeficiency)      Hyponatremia      Smoker      Deviated septum      Loss of teeth due to extraction  All teeth due to dental carries          SOCIAL HISTORY:  - Smokes tobacco  - Denies IVDU  - Has not worked since age 23      FAMILY HISTORY:  Family history of throat cancer (Father)    Family history of leukemia (Mother)        Allergies  penicillin (Rash)  amoxicillin (Fever)        ANTIMICROBIALS:  vancomycin  IVPB 1750 every 12 hours      ANTIMICROBIALS (past 90 days):  MEDICATIONS  (STANDING):  cefepime   IVPB   100 mL/Hr IV Intermittent (23 @ 18:53)    vancomycin  IVPB   250 mL/Hr IV Intermittent (23 @ 06:58)    vancomycin  IVPB.   250 mL/Hr IV Intermittent (23 @ 19:28)        OTHER MEDS:   MEDICATIONS  (STANDING):  acetaminophen     Tablet .. 650 every 6 hours PRN  atorvastatin 40 at bedtime  budesonide  80 MICROgram(s)/formoterol 4.5 MICROgram(s) Inhaler 2 two times a day  dextrose 50% Injectable 25 once  dextrose Oral Gel 15 once PRN  glucagon  Injectable 1 once  heparin   Injectable 5000 every 8 hours  insulin lispro (ADMELOG) corrective regimen sliding scale  three times a day before meals  insulin lispro (ADMELOG) corrective regimen sliding scale  at bedtime  levothyroxine 50 daily  tiotropium 2.5 MICROgram(s) Inhaler 2 daily      VITALS:  Vital Signs Last 24 Hrs  T(F): 98.2 (23 @ 06:22), Max: 102.6 (23 @ 17:14)    Vital Signs Last 24 Hrs  HR: 76 (23 @ 06:22) (62 - 78)  BP: 109/60 (23 @ 06:22) (107/39 - 118/61)  RR: 18 (23 @ 06:22)  SpO2: 96% (23 @ 06:22) (95% - 97%)  Wt(kg): --    EXAM:  GENERAL: NAD, lying in bed comfortably  HEAD:  Atraumatic, normocephalic  EYES: EOMI, PERRLA, conjunctiva and sclera clear  ENT: Moist mucous membranes  NECK: Supple, no JVD  HEART: Regular rate and rhythm, no murmurs, rubs, or gallops  LUNGS: Unlabored respirations.  Clear to auscultation bilaterally, no crackles, wheezing, or rhonchi  ABDOMEN: Soft, nontender, nondistended, +BS  EXTREMITIES: 2+ peripheral pulses bilaterally. No clubbing, cyanosis, or edema  NERVOUS SYSTEM:  A&Ox3, no focal deficits   SKIN: Perianal area of fluctuance, induration and warmth most prominent R buttock      Labs:                        10.9   15.03 )-----------( 158      ( 2023 06:59 )             32.8         127<L>  |  91<L>  |  12  ----------------------------<  83  3.8   |  20<L>  |  1.22    Ca    8.6      2023 06:59  Phos  3.2       Mg     1.70         TPro  6.7  /  Alb  3.6  /  TBili  0.5  /  DBili  x   /  AST  19  /  ALT  13  /  AlkPhos  117        WBC Trend:  WBC Count: 15.03 (23 @ 06:59)  WBC Count: 20.27 (23 @ 17:00)      Auto Neutrophil #: 13.04 K/uL (23 @ 06:59)  Auto Neutrophil #: 17.98 K/uL (23 @ 17:00)  Band Neutrophils %: 4.3 % (23 @ 17:00)  Auto Neutrophil #: 4.88 K/uL (23 @ 03:00)  Auto Neutrophil #: 4.66 K/uL (07-15-23 @ 20:00)      Creatine Trend:  Creatinine: 1.22 ()  Creatinine: 1.47 ()      Liver Biochemical Testing Trend:  Alanine Aminotransferase (ALT/SGPT): 13 ()  Alanine Aminotransferase (ALT/SGPT): 14 ()  Alanine Aminotransferase (ALT/SGPT): 15 (07-15)  Alanine Aminotransferase (ALT/SGPT): 18 ()  Alanine Aminotransferase (ALT/SGPT): 35 ()  Aspartate Aminotransferase (AST/SGOT): 19 (23 @ 17:00)  Aspartate Aminotransferase (AST/SGOT): 16 (23 @ 03:00)  Aspartate Aminotransferase (AST/SGOT): 17 (07-15-23 @ 20:00)  Aspartate Aminotransferase (AST/SGOT): 27 (23 @ 23:42)  Aspartate Aminotransferase (AST/SGOT): 16 (23 @ 07:30)  Bilirubin Total: 0.5 ()  Bilirubin Total: 0.2 ()  Bilirubin Total: <0.2 (07-15)  Bilirubin Total: 0.3 ()  Bilirubin Total, Serum: 0.4 ()      Trend LDH  03-15-23 @ 12:18  413<H>      Auto Eosinophil %: 0.3 % (23 @ 06:59)  Auto Eosinophil %: 0.0 % (23 @ 17:00)      Urinalysis Basic - ( 2023 07:15 )    Color: Yellow / Appearance: Clear / S.025 / pH: x  Gluc: x / Ketone: Negative mg/dL  / Bili: Negative / Urobili: 1.0 mg/dL   Blood: x / Protein: 30 mg/dL / Nitrite: Negative   Leuk Esterase: Negative / RBC: 7 /HPF / WBC 0 /HPF   Sq Epi: x / Non Sq Epi: 1 /HPF / Bacteria: Negative /HPF        MICROBIOLOGY:  Vancomycin Level, Trough: 9.7 ( @ 05:30)  Vancomycin Level, Random: 9.4 ( @ 04:00)  Vancomycin Level, Trough: 10.9 ( @ 04:00)  Vancomycin Level, Trough: 5.2 ( @ 04:45)    MRSA PCR Result.: Detected (07-15-23 @ 20:00)      Culture - Abscess with Gram Stain (collected 2023 18:56)  Source: .Abscess Right perineum  Final Report:    Few Methicillin Resistant Staphylococcus aureus  Organism: Methicillin resistant Staphylococcus aureus  Organism: Methicillin resistant Staphylococcus aureus    Sensitivities:      Method Type: HATTIE      -  Ampicillin/Sulbactam: R <=8/4      -  Cefazolin: R <=4      -  Clindamycin: R >4      -  Daptomycin: S 0.5      -  Erythromycin: R >4      -  Gentamicin: S <=1 Should not be used as monotherapy      -  Linezolid: S 2      -  Oxacillin: R >2      -  Penicillin: R >8      -  Rifampin: S <=1 Should not be used as monotherapy      -  Tetracycline: S <=1      -  Trimethoprim/Sulfamethoxazole: S <=0.5/9.5      -  Vancomycin: S 2    Culture - Blood (collected 15 Jul 2023 21:30)  Source: .Blood Blood-Peripheral  Final Report:    No growth at 5 days    Culture - Blood (collected 15 Jul 2023 21:00)  Source: .Blood Blood-Peripheral  Final Report:    No growth at 5 days    Culture - Urine (collected 2023 23:47)  Source: Clean Catch Clean Catch (Midstream)  Final Report:    <10,000 CFU/mL Normal Urogenital Sydni    Culture - Blood (collected 10 May 2023 15:21)  Source: .Blood Blood-Peripheral  Final Report:    No Growth Final    Culture - Blood (collected 10 May 2023 07:30)  Source: .Blood Blood-Peripheral  Final Report:    No Growth Final    Culture - Abscess with Gram Stain (collected 09 May 2023 10:50)  Source: .Abscess right buttock  Final Report:    Moderate Methicillin Resistant Staphylococcus aureus  Organism: Methicillin resistant Staphylococcus aureus  Organism: Methicillin resistant Staphylococcus aureus    Sensitivities:      Method Type: HATTIE      -  Ampicillin/Sulbactam: R 16/8      -  Cefazolin: R <=4      -  Clindamycin: R >4      -  Daptomycin: S 0.5      -  Erythromycin: R >4      -  Gentamicin: S <=1 Should not be used as monotherapy      -  Linezolid: S 1      -  Oxacillin: R >2      -  Penicillin: R >8      -  Rifampin: R >2 Should not be used as monotherapy      -  Tetracycline: R >8      -  Trimethoprim/Sulfamethoxazole: S <=0.5/9.5      -  Vancomycin: S 2    Culture - Blood (collected 08 May 2023 06:03)  Source: .Blood Blood-Peripheral  Final Report:    No Growth Final    Culture - Blood (collected 08 May 2023 05:55)  Source: .Blood Blood-Peripheral  Final Report:    No Growth Final    Culture - Urine (collected 06 May 2023 21:40)  Source: Clean Catch Clean Catch (Midstream)  Final Report:    No growth      HIV-1/2 Combo Result: Nonreact (23 @ 05:33)    COVID-19 PCR: NotDetec (23 @ 06:50)      C-Reactive Protein, Serum: 262.9 ()      Blood Gas Venous - Lactate: 1.9 ( @ 17:00)    A1C with Estimated Average Glucose Result: 5.2 % (23 @ 23:42)  A1C with Estimated Average Glucose Result: 6.3 % (23 @ 05:55)  A1C with Estimated Average Glucose Result: 6.3 % (23 @ 10:43)      RADIOLOGY:  imaging below personally reviewed    < from: CT Abdomen and Pelvis w/ IV Cont (23 @ 19:49) >  IMPRESSION:  Worsening subcutaneous edema in the right median gluteal fold with soft   tissue thickening and phlegmonous change in the perianal region and   thickening of the levator ani muscle. Gluteal region is excluded from the   image and there is regional beam hardening artifact related to patient   positioning. No definitive drainable fluid collection within the   visualized region of interest or subcutaneous gas.    < end of copied text >

## 2023-07-30 NOTE — PROGRESS NOTE ADULT - SUBJECTIVE AND OBJECTIVE BOX
DATE OF SERVICE: 07-30-23 @ 07:59    Patient is a 57y old  Male who presents with a chief complaint of sepsis (29 Jul 2023 21:02)      SUBJECTIVE / OVERNIGHT EVENTS:        MEDICATIONS  (STANDING):  atorvastatin 40 milliGRAM(s) Oral at bedtime  budesonide  80 MICROgram(s)/formoterol 4.5 MICROgram(s) Inhaler 2 Puff(s) Inhalation two times a day  dextrose 5%. 1000 milliLiter(s) (50 mL/Hr) IV Continuous <Continuous>  dextrose 50% Injectable 25 Gram(s) IV Push once  glucagon  Injectable 1 milliGRAM(s) IntraMuscular once  heparin   Injectable 5000 Unit(s) SubCutaneous every 8 hours  insulin lispro (ADMELOG) corrective regimen sliding scale   SubCutaneous at bedtime  insulin lispro (ADMELOG) corrective regimen sliding scale   SubCutaneous three times a day before meals  levothyroxine 50 MICROGram(s) Oral daily  tiotropium 2.5 MICROgram(s) Inhaler 2 Puff(s) Inhalation daily  vancomycin  IVPB 1750 milliGRAM(s) IV Intermittent every 12 hours    MEDICATIONS  (PRN):  acetaminophen     Tablet .. 650 milliGRAM(s) Oral every 6 hours PRN Temp greater or equal to 38C (100.4F), Mild Pain (1 - 3)  dextrose Oral Gel 15 Gram(s) Oral once PRN Blood Glucose LESS THAN 70 milliGRAM(s)/deciliter      Vital Signs Last 24 Hrs  T(C): 36.8 (30 Jul 2023 06:22), Max: 39.2 (29 Jul 2023 17:14)  T(F): 98.2 (30 Jul 2023 06:22), Max: 102.6 (29 Jul 2023 17:14)  HR: 76 (30 Jul 2023 06:22) (62 - 78)  BP: 109/60 (30 Jul 2023 06:22) (107/39 - 118/61)  BP(mean): --  RR: 18 (30 Jul 2023 06:22) (18 - 22)  SpO2: 96% (30 Jul 2023 06:22) (95% - 97%)    Parameters below as of 30 Jul 2023 06:22  Patient On (Oxygen Delivery Method): room air      CAPILLARY BLOOD GLUCOSE  POCT Blood Glucose.: 96 mg/dL (29 Jul 2023 22:35)  POCT Blood Glucose.: 101 mg/dL (29 Jul 2023 15:37)        PHYSICAL EXAM:  GENERAL: NAD, well-developed  HEAD:  Atraumatic, Normocephalic  EYES: EOMI, PERRLA, conjunctiva and sclera clear  NECK: Supple, No JVD  CHEST/LUNG: Clear to auscultation bilaterally; No wheeze  HEART: Regular rate and rhythm; No murmurs, rubs, or gallops  ABDOMEN: Soft, Nontender, Nondistended; Bowel sounds present  EXTREMITIES:  2+ Peripheral Pulses, No clubbing, cyanosis, or edema  PSYCH: AAOx3  NEUROLOGY: non-focal  SKIN: No rashes or lesions    LABS:                 RADIOLOGY & ADDITIONAL TESTS:    Imaging Personally Reviewed:    Consultant(s) Notes Reviewed:      Care Discussed with Consultants/Other Providers:   DATE OF SERVICE: 23 @ 07:59    Patient is a 57y old  Male who presents with a chief complaint of sepsis (2023 21:02)      SUBJECTIVE / OVERNIGHT EVENTS:  Patient reports that he last 15 lbs in the past week due to decreased appetite and nausea.  States that he has developed 4 skin infections in the past 4 months. Has tried using "HIVA clean" soap daily without success.  Reports that he has DM but has close PCP follow up and was told is blood glucose is "normal"    MEDICATIONS  (STANDING):  atorvastatin 40 milliGRAM(s) Oral at bedtime  budesonide  80 MICROgram(s)/formoterol 4.5 MICROgram(s) Inhaler 2 Puff(s) Inhalation two times a day  dextrose 5%. 1000 milliLiter(s) (50 mL/Hr) IV Continuous <Continuous>  dextrose 50% Injectable 25 Gram(s) IV Push once  glucagon  Injectable 1 milliGRAM(s) IntraMuscular once  heparin   Injectable 5000 Unit(s) SubCutaneous every 8 hours  insulin lispro (ADMELOG) corrective regimen sliding scale   SubCutaneous at bedtime  insulin lispro (ADMELOG) corrective regimen sliding scale   SubCutaneous three times a day before meals  levothyroxine 50 MICROGram(s) Oral daily  tiotropium 2.5 MICROgram(s) Inhaler 2 Puff(s) Inhalation daily  vancomycin  IVPB 1750 milliGRAM(s) IV Intermittent every 12 hours    MEDICATIONS  (PRN):  acetaminophen     Tablet .. 650 milliGRAM(s) Oral every 6 hours PRN Temp greater or equal to 38C (100.4F), Mild Pain (1 - 3)  dextrose Oral Gel 15 Gram(s) Oral once PRN Blood Glucose LESS THAN 70 milliGRAM(s)/deciliter      Vital Signs Last 24 Hrs  T(C): 36.8 (2023 06:22), Max: 39.2 (2023 17:14)  T(F): 98.2 (2023 06:22), Max: 102.6 (2023 17:14)  HR: 76 (2023 06:22) (62 - 78)  BP: 109/60 (2023 06:22) (107/39 - 118/61)  BP(mean): --  RR: 18 (2023 06:22) (18 - 22)  SpO2: 96% (2023 06:22) (95% - 97%)    Parameters below as of 2023 06:22  Patient On (Oxygen Delivery Method): room air      CAPILLARY BLOOD GLUCOSE  POCT Blood Glucose.: 96 mg/dL (2023 22:35)  POCT Blood Glucose.: 101 mg/dL (2023 15:37)        PHYSICAL EXAM:  GENERAL: NAD, well-developed  HEAD:  Atraumatic, Normocephalic  EYES: conjunctiva and sclera clear  NECK: Supple, No JVD  CHEST/LUNG: course anterior breath sounds, otherwise clear bilaterally. No wheeze  HEART: Regular rate and rhythm; No murmurs, rubs, or gallops  ABDOMEN: Soft, Nontender, Nondistended; Bowel sounds present  EXTREMITIES:  No cyanosis or edema  PSYCH: AAOx3  NEUROLOGY: non-focal  SKIN: Edematous/erythematous region on L side of gluteal cleft without purluence/drainage. Healed ulcer on upper R gluteal region (no edema).    LABS:                        10.9   15.03 )-----------( 158      ( 2023 06:59 )             32.8     07-30    127<L>  |  91<L>  |  12  ----------------------------<  83  3.8   |  20<L>  |  1.22    Ca    8.6      2023 06:59  Phos  3.2     07-30  Mg     1.70     07-30    TPro  6.7  /  Alb  3.6  /  TBili  0.5  /  DBili  x   /  AST  19  /  ALT  13  /  AlkPhos  117  07-29    PT/INR - ( 2023 17:00 )   PT: 16.3 sec;   INR: 1.47 ratio      PTT - ( 2023 17:00 )  PTT:24.8 sec      Urinalysis Basic - ( 2023 07:15 )  Color: Yellow / Appearance: Clear / S.025 / pH: x  Gluc: x / Ketone: Negative mg/dL  / Bili: Negative / Urobili: 1.0 mg/dL   Blood: x / Protein: 30 mg/dL / Nitrite: Negative   Leuk Esterase: Negative / RBC: 7 /HPF / WBC 0 /HPF   Sq Epi: x / Non Sq Epi: 1 /HPF / Bacteria: Negative /HPF                 RADIOLOGY & ADDITIONAL TESTS:   CT Abdomen and Pelvis w/ IV Cont (23 @ 19:49)  Worsening subcutaneous edema in the right median gluteal fold with soft   tissue thickening and phlegmonous change in the perianal region and   thickening of the levator ani muscle. Gluteal region is excluded from the   image and there is regional beam hardening artifact related to patient   positioning. No definitive drainable fluid collection within the   visualized region of interest or subcutaneous gas.    Imaging Personally Reviewed:  Consultant(s) Notes Reviewed:  yes  Care Discussed with Consultants/Other Providers: yes

## 2023-07-30 NOTE — PROGRESS NOTE ADULT - ASSESSMENT
Mr. Cristina is a 57y M with PMH Schizoaffective D/O (Bipolar Type), HTN, HLD, Hypothyroidism, Daily Smoker, CVID/ Hypogammaglobulinemia (monthly IVIG - last 7/6/2023), T2DM, COPD, recent Lt gluteal MRSA abscess/cellulitis w/ MRSA bacteremia s/p Debridement 6/2023 presenting with worsening right buttock cellulitis. Pt found to be septic likely 2/2 buttock cellulitis. Pt admitted for further management.

## 2023-07-30 NOTE — CONSULT NOTE ADULT - ATTENDING COMMENTS
57 M PMHx Schizoaffective D/O (Bipolar Type), HTN, HLD, Hypothyroidism, Daily Smoker, CVID/ Hypogammaglobulinemia (monthly IVIG - last 7/6/2023), T2DM, COPD, recent Lt gluteal MRSA abscess/cellulitis w/ MRSA bacteremia s/p Debridement 6/2023 presenting with worsening right buttock cellulitis  Fever, leukocytosis  Prior MRSA bacteremia  Prior MRSA wound infection  Multiple episodes of recurring abscess/gluteal infection 2/2 MRSA  CT worsening edema R gluteal fold, phlegmonous change perineal, no drainable lesion (7/29)  UA neg  Gluteal SSTI  Overall, Fever, SSTI recurring, hypogammaglobulinemia  - Vanco 1750mg q 12 (monitor levels, dose may be high)  - F/U BCXs  - Surgical eval to wound/wound care  - Trend WBC to normal  - Monitor for further fevers  - Monitor for improvement  - Considering multiple recurrence, possible suppression once treatment course completed    Josr Downs MD  Contact on TEAMS messaging from 9am - 5pm  From 5pm-9am, on weekends, or if no response call [phone]    I was physically present for the key portions of the evaluation and management service provided. I saw and examined the patient. I agree with the above history, physical, and plan except for any discrepancies which I have documented in “Attending Statements.” Please refer to “Attending Statements” for final plan.

## 2023-07-30 NOTE — PROGRESS NOTE ADULT - PROBLEM SELECTOR PLAN 3
Cr 1.46, up from baseline of 0.8. Possibly ATN in setting of sepsis   - f/u urine studies  - monitor Cr  - avoid nephrotoxins, renally dose meds Cr 1.46 --> 1.22, up from baseline of 0.8. FeNa 0.2% suggested pre-renal etiology, likely secondary to sepsis and poor PO intake.  - NS 100cc/h x10h ordered  - monitor Cr  - avoid nephrotoxins, renally dose meds

## 2023-07-30 NOTE — PROGRESS NOTE ADULT - PROBLEM SELECTOR PLAN 6
Stable. Patient on monthly Abilify injections Stable. Patient on monthly Abilify injections (last injection 7/26).

## 2023-07-30 NOTE — PROGRESS NOTE ADULT - PROBLEM SELECTOR PLAN 7
Stable. c/w [facility] therapeutic interchange Stable, continue trelegy therapeutic interchange

## 2023-07-30 NOTE — PROGRESS NOTE ADULT - TIME BILLING
reviewing laboratory data, consultants' recommendations, documentation in [place], performing medically appropriate examinations/evaluations, discussion with patient/family/RN/ACP/Residents and interdisciplinary staff (such as , social workers, etc), counseling patient/family/care giver, ordering medically appropriate medication, tests, or procedures. Interventions were performed as documented above.

## 2023-07-30 NOTE — PROGRESS NOTE ADULT - ATTENDING COMMENTS
57M with CVID and recurrent cellulitis pw Rt glutteal cellulitis and sepsis. c/w vanco. f/u Bcx. ID consult. Hyponatremia, pre-renal TASH, gentle hydration today. hold Diltiazem, BP soft. DM, c/w ISS.

## 2023-07-30 NOTE — CONSULT NOTE ADULT - ASSESSMENT
57M PMHx Schizoaffective D/O (Bipolar Type), HTN, HLD, Hypothyroidism, Daily Smoker, CVID/ Hypogammaglobulinemia (monthly IVIG - last 7/6/2023), T2DM, COPD, recent Lt gluteal MRSA abscess/cellulitis w/ MRSA bacteremia s/p Debridement 6/2023 presenting with worsening right buttock cellulitis.    Recurrent MRSA gluteal abscess  Recently admitted for same issue, abscess with MRSA without bacteremia, treated for 7 days with Bactrim  Prior to that had MRSA cellulitis c/b MRSA bacteremia (5/6, cleared 5/8) treated with 4 weeks of vancomycin    Febrile 102.6, leukocytosis w neutrophilia  UA negative  CT: worsening subcutaneous edema in the right median gluteal fold with soft   tissue thickening and phlegmonous change in the perianal region  BCx pending  Vancomcyin (7/29-)    Pt to be seen. 57M PMHx Schizoaffective D/O (Bipolar Type), HTN, HLD, Hypothyroidism, Daily Smoker, CVID/ Hypogammaglobulinemia (monthly IVIG - last 7/6/2023), T2DM, COPD, recent Lt gluteal MRSA abscess/cellulitis w/ MRSA bacteremia s/p Debridement 6/2023 presenting with worsening right buttock cellulitis.    Recurrent MRSA gluteal abscess  Recently admitted for same issue, abscess with MRSA without bacteremia, treated for 7 days with Bactrim  Prior to that had MRSA cellulitis c/b MRSA bacteremia (5/6, cleared 5/8) treated with 4 weeks of vancomycin    Febrile 102.6, leukocytosis w neutrophilia  UA negative  CT: worsening subcutaneous edema in the right median gluteal fold with soft   tissue thickening and phlegmonous change in the perianal region  BCx pending  Vancomcyin (7/29-)    Recommendations:  - Vancomycin 1750 mg q12h, check level before every 4th dose  - F/u BCx  - Needs wound care evaluation and possible surgical eval depending on their recs  - May be need suppression treatment following acute course, TBD    D/w attending.    Jan Stevenson, PGY4  ID Fellow  [employer] Teams Preferred  After 5pm/weekends call [phone]

## 2023-07-30 NOTE — PROGRESS NOTE ADULT - PROBLEM SELECTOR PLAN 4
On home metformin.  - start low dose ISS Patient takes metformin at home.  - low dose ISS while inpt

## 2023-07-30 NOTE — PROGRESS NOTE ADULT - PROBLEM SELECTOR PLAN 8
DVT prophylaxis: lovenox  Diet: dash/tlc, cc DVT prophylaxis: heparin  Diet: dash/tlc, cc DVT ppx: heparin 5000u SQ q8h  Diet: dash/tlc, cc  Code status: full code pending discussion  Dispo: pending clinical improvement

## 2023-07-30 NOTE — PROGRESS NOTE ADULT - PROBLEM SELECTOR PLAN 5
Hold home diltiazem in setting of sepsis, restart when indicated Hold home diltiazem in setting of sepsis. /60 this AM

## 2023-07-31 LAB
ALBUMIN SERPL ELPH-MCNC: 3.4 G/DL — SIGNIFICANT CHANGE UP (ref 3.3–5)
ALP SERPL-CCNC: 144 U/L — HIGH (ref 40–120)
ALT FLD-CCNC: 24 U/L — SIGNIFICANT CHANGE UP (ref 4–41)
ANION GAP SERPL CALC-SCNC: 16 MMOL/L — HIGH (ref 7–14)
AST SERPL-CCNC: 23 U/L — SIGNIFICANT CHANGE UP (ref 4–40)
BILIRUB SERPL-MCNC: 0.2 MG/DL — SIGNIFICANT CHANGE UP (ref 0.2–1.2)
BUN SERPL-MCNC: 10 MG/DL — SIGNIFICANT CHANGE UP (ref 7–23)
CALCIUM SERPL-MCNC: 8.9 MG/DL — SIGNIFICANT CHANGE UP (ref 8.4–10.5)
CHLORIDE SERPL-SCNC: 93 MMOL/L — LOW (ref 98–107)
CO2 SERPL-SCNC: 21 MMOL/L — LOW (ref 22–31)
CREAT SERPL-MCNC: 0.98 MG/DL — SIGNIFICANT CHANGE UP (ref 0.5–1.3)
EGFR: 91 ML/MIN/1.73M2 — SIGNIFICANT CHANGE UP
GLUCOSE BLDC GLUCOMTR-MCNC: 114 MG/DL — HIGH (ref 70–99)
GLUCOSE BLDC GLUCOMTR-MCNC: 119 MG/DL — HIGH (ref 70–99)
GLUCOSE BLDC GLUCOMTR-MCNC: 172 MG/DL — HIGH (ref 70–99)
GLUCOSE BLDC GLUCOMTR-MCNC: 98 MG/DL — SIGNIFICANT CHANGE UP (ref 70–99)
GLUCOSE SERPL-MCNC: 99 MG/DL — SIGNIFICANT CHANGE UP (ref 70–99)
HCT VFR BLD CALC: 33.4 % — LOW (ref 39–50)
HGB BLD-MCNC: 11.1 G/DL — LOW (ref 13–17)
MAGNESIUM SERPL-MCNC: 2.1 MG/DL — SIGNIFICANT CHANGE UP (ref 1.6–2.6)
MCHC RBC-ENTMCNC: 27.3 PG — SIGNIFICANT CHANGE UP (ref 27–34)
MCHC RBC-ENTMCNC: 33.2 GM/DL — SIGNIFICANT CHANGE UP (ref 32–36)
MCV RBC AUTO: 82.3 FL — SIGNIFICANT CHANGE UP (ref 80–100)
NRBC # BLD: 0 /100 WBCS — SIGNIFICANT CHANGE UP (ref 0–0)
NRBC # FLD: 0 K/UL — SIGNIFICANT CHANGE UP (ref 0–0)
PHOSPHATE SERPL-MCNC: 3.4 MG/DL — SIGNIFICANT CHANGE UP (ref 2.5–4.5)
PLATELET # BLD AUTO: 182 K/UL — SIGNIFICANT CHANGE UP (ref 150–400)
POTASSIUM SERPL-MCNC: 4 MMOL/L — SIGNIFICANT CHANGE UP (ref 3.5–5.3)
POTASSIUM SERPL-SCNC: 4 MMOL/L — SIGNIFICANT CHANGE UP (ref 3.5–5.3)
PROT SERPL-MCNC: 6.5 G/DL — SIGNIFICANT CHANGE UP (ref 6–8.3)
RBC # BLD: 4.06 M/UL — LOW (ref 4.2–5.8)
RBC # FLD: 14.9 % — HIGH (ref 10.3–14.5)
SODIUM SERPL-SCNC: 130 MMOL/L — LOW (ref 135–145)
VANCOMYCIN TROUGH SERPL-MCNC: 10.2 UG/ML — SIGNIFICANT CHANGE UP (ref 10–20)
VANCOMYCIN TROUGH SERPL-MCNC: 11.8 UG/ML — SIGNIFICANT CHANGE UP (ref 10–20)
WBC # BLD: 9.33 K/UL — SIGNIFICANT CHANGE UP (ref 3.8–10.5)
WBC # FLD AUTO: 9.33 K/UL — SIGNIFICANT CHANGE UP (ref 3.8–10.5)

## 2023-07-31 PROCEDURE — 99222 1ST HOSP IP/OBS MODERATE 55: CPT

## 2023-07-31 PROCEDURE — 99232 SBSQ HOSP IP/OBS MODERATE 35: CPT

## 2023-07-31 PROCEDURE — 99222 1ST HOSP IP/OBS MODERATE 55: CPT | Mod: GC

## 2023-07-31 RX ORDER — ACETAMINOPHEN 500 MG
650 TABLET ORAL ONCE
Refills: 0 | Status: COMPLETED | OUTPATIENT
Start: 2023-07-31 | End: 2023-07-31

## 2023-07-31 RX ADMIN — Medication 650 MILLIGRAM(S): at 09:09

## 2023-07-31 RX ADMIN — Medication 650 MILLIGRAM(S): at 02:53

## 2023-07-31 RX ADMIN — Medication 650 MILLIGRAM(S): at 01:53

## 2023-07-31 RX ADMIN — Medication 50 MICROGRAM(S): at 05:21

## 2023-07-31 RX ADMIN — Medication 650 MILLIGRAM(S): at 23:16

## 2023-07-31 RX ADMIN — BUDESONIDE AND FORMOTEROL FUMARATE DIHYDRATE 2 PUFF(S): 160; 4.5 AEROSOL RESPIRATORY (INHALATION) at 09:00

## 2023-07-31 RX ADMIN — Medication 250 MILLIGRAM(S): at 20:26

## 2023-07-31 RX ADMIN — HEPARIN SODIUM 5000 UNIT(S): 5000 INJECTION INTRAVENOUS; SUBCUTANEOUS at 12:47

## 2023-07-31 RX ADMIN — HEPARIN SODIUM 5000 UNIT(S): 5000 INJECTION INTRAVENOUS; SUBCUTANEOUS at 21:03

## 2023-07-31 RX ADMIN — BUDESONIDE AND FORMOTEROL FUMARATE DIHYDRATE 2 PUFF(S): 160; 4.5 AEROSOL RESPIRATORY (INHALATION) at 21:00

## 2023-07-31 RX ADMIN — Medication 1: at 12:44

## 2023-07-31 RX ADMIN — Medication 250 MILLIGRAM(S): at 08:24

## 2023-07-31 RX ADMIN — Medication 650 MILLIGRAM(S): at 22:16

## 2023-07-31 RX ADMIN — TIOTROPIUM BROMIDE 2 PUFF(S): 18 CAPSULE ORAL; RESPIRATORY (INHALATION) at 09:00

## 2023-07-31 RX ADMIN — ATORVASTATIN CALCIUM 40 MILLIGRAM(S): 80 TABLET, FILM COATED ORAL at 21:03

## 2023-07-31 RX ADMIN — Medication 650 MILLIGRAM(S): at 10:09

## 2023-07-31 RX ADMIN — HEPARIN SODIUM 5000 UNIT(S): 5000 INJECTION INTRAVENOUS; SUBCUTANEOUS at 05:20

## 2023-07-31 NOTE — PROGRESS NOTE ADULT - ATTENDING COMMENTS
Pt seen and examined 7/31/23    57M with hx of schizoaffective disorder, CVID/hypogammaglobulinemia on monthly IVIG, HTN, DM2, p/w sepsis from Lt gluteal MRSA abscess/cellulitis – failed previous treatments c/b MRSA bacteremia s/p Debridement in May and June of this year, who presents with rt gluteal cellulitis.  Also found to have TASH which has responded to IVF hydration.    Blood cxs 7/29/23 so far are negative.  Vancomycin trough today is 10.2, next trough should be on morning of 8/2/23 prior to the vancomycin dose.    Wound care following as well.    Appreciate PGY-1, ID! Pt seen and examined 7/31/23    57M with hx of schizoaffective disorder, CVID/hypogammaglobulinemia on monthly IVIG, HTN, DM2, p/w sepsis from Lt gluteal MRSA abscess/cellulitis – failed previous treatments c/b MRSA bacteremia s/p Debridement in May and June of this year, who presents with rt gluteal cellulitis.  Also found to have TASH which has responded to IVF hydration.    Blood cxs 7/29/23 so far are negative.  Vancomycin trough today is 10.2, next trough should be on morning of 8/2/23 prior to the vancomycin dose.    Wound care following as well.    Appreciate PGY-1, ID!    Kemal Harden M.D.  [facility]  [phone]

## 2023-07-31 NOTE — PROGRESS NOTE ADULT - SUBJECTIVE AND OBJECTIVE BOX
CC: F/U for Wound infection    Saw/spoke to patient. No fevers, no chills. No new complaints.    Allergies  penicillin (Rash)  amoxicillin (Fever)    ANTIMICROBIALS:  vancomycin  IVPB 1750 every 12 hours    PE:    Vital Signs Last 24 Hrs  T(C): 36.7 (31 Jul 2023 13:10), Max: 36.7 (30 Jul 2023 21:18)  T(F): 98.1 (31 Jul 2023 13:10), Max: 98.1 (31 Jul 2023 13:10)  HR: 66 (31 Jul 2023 13:10) (66 - 80)  BP: 122/76 (31 Jul 2023 13:10) (122/76 - 138/70)  RR: 18 (31 Jul 2023 13:10) (18 - 18)  SpO2: 100% (31 Jul 2023 13:10) (94% - 100%)    Gen: AOx3, NAD, non-toxic  CV: Nontachycardic  Resp: Breathing comfortably, RA  Abd: Soft, nontender  IV/Skin: No thrombophlebitis    LABS:                        11.1   9.33  )-----------( 182      ( 31 Jul 2023 07:05 )             33.4     07-31    130<L>  |  93<L>  |  10  ----------------------------<  99  4.0   |  21<L>  |  0.98    Ca    8.9      31 Jul 2023 07:05  Phos  3.4     07-31  Mg     2.10     07-31    TPro  6.5  /  Alb  3.4  /  TBili  0.2  /  DBili  x   /  AST  23  /  ALT  24  /  AlkPhos  144<H>  07-31    Urinalysis Basic - ( 31 Jul 2023 07:05 )    Color: x / Appearance: x / SG: x / pH: x  Gluc: 99 mg/dL / Ketone: x  / Bili: x / Urobili: x   Blood: x / Protein: x / Nitrite: x   Leuk Esterase: x / RBC: x / WBC x   Sq Epi: x / Non Sq Epi: x / Bacteria: x      MICROBIOLOGY:  Vancomycin Level, Trough: 10.2 ug/mL (07-31-23 @ 07:05)    .Blood Blood-Peripheral  07-29-23   No growth at 24 hours  --  --    .Blood Blood-Peripheral  07-29-23   No growth at 24 hours  --  --    .Abscess Right perineum  07-17-23   Few Methicillin Resistant Staphylococcus aureus  --  Methicillin resistant Staphylococcus aureus    .Blood Blood-Peripheral  07-15-23   No growth at 5 days  --  --    .Blood Blood-Peripheral  07-15-23   No growth at 5 days  --  --    Clean Catch Clean Catch (Midstream)  07-14-23   <10,000 CFU/mL Normal Urogenital Sydni  --  --    (otherwise reviewed)    RADIOLOGY:    7/29 CT:    IMPRESSION:  Worsening subcutaneous edema in the right median gluteal fold with soft   tissue thickening and phlegmonous change in the perianal region and   thickening of the levator ani muscle. Gluteal region is excluded from the   image and there is regional beam hardening artifact related to patient   positioning. No definitive drainable fluid collection within the   visualized region of interest or subcutaneous gas.

## 2023-07-31 NOTE — CONSULT NOTE ADULT - SUBJECTIVE AND OBJECTIVE BOX
Wound SURGERY CONSULT NOTE    HPI:Pt is a 57M PMHx Schizoaffective D/O (Bipolar Type), HTN, HLD, Hypothyroidism, Daily Smoker, CVID/ Hypogammaglobulinemia (monthly IVIG - last 7/6/2023), T2DM, COPD, recent Lt gluteal MRSA abscess/cellulitis w/ MRSA bacteremia s/p Debridement 6/2023 presenting with worsening right buttock cellulitis. Pt states symptoms started about 4 days ago, came to ED on 7/27 and discharged on bactrim, reports compliance. Pt states he was told to return if cellulitis worsened. He reports increased redness, edema, and pain around buttock with purulent drainage. He denies fever, chills, abd pain, chest pain, SOB, n/v/d, HA or urinary symptoms.     In ED, vitals T 102.6, HR 78, /61, RR 22, O2 sat 96% on RA  Labs significant for: wbc 20.27, Hb 11.3, Na 124, BUN/Cr 11/1.47, .9  EKG personally reviewed:  CXR:  Imaging: CT a/p: IMPRESSION:  Worsening subcutaneous edema in the right median gluteal fold with soft   tissue thickening and phlegmonous change in the perianal region and   thickening of the levator ani muscle. Gluteal region is excluded from the   image and there is regional beam hardening artifact related to patient   positioning. No definitive drainable fluid collection within the   visualized region of interest or subcutaneous gas.  ED management: IV vanc and cefepime, tylenol, zofran, 1 L NS (29 Jul 2023 21:02)      Wound consult requested to assist w/ management of  right gluteal fold abscess. Patient endorses return from home for severe pain in right buttock, unsure if he had fevers but " felt cold sometimes". Otherwise no major changes in health. Reports some drainage on Friday from right buttock, but now drainage stopped. Endorses previous small tract in right perineum " closed". Endorses last BM on Friday denies constipation.     Current Diet: Diet, DASH/TLC:   Sodium & Cholesterol Restricted  Consistent Carbohydrate Evening Snack (CSTCHOSN)  1500mL Fluid Restriction ([id number]) (07-29-23 @ 22:57)      PAST MEDICAL & SURGICAL HISTORY:  Smoker  DM (diabetes mellitus)  HTN (hypertension)  Abscess of finger  Bipolar disorder  Chronic hyponatremia  CVID (common variable immunodeficiency)  Hyponatremia  Smoker  Deviated septum  Loss of teeth due to extraction  All teeth due to dental carries    REVIEW OF SYSTEMS:   General/ Skin/ see HPI and HPI   All other systems negative    MEDICATIONS  (STANDING):  atorvastatin 40 milliGRAM(s) Oral at bedtime  budesonide  80 MICROgram(s)/formoterol 4.5 MICROgram(s) Inhaler 2 Puff(s) Inhalation two times a day  dextrose 5%. 1000 milliLiter(s) (50 mL/Hr) IV Continuous <Continuous>  dextrose 50% Injectable 25 Gram(s) IV Push once  glucagon  Injectable 1 milliGRAM(s) IntraMuscular once  heparin   Injectable 5000 Unit(s) SubCutaneous every 8 hours  insulin lispro (ADMELOG) corrective regimen sliding scale   SubCutaneous three times a day before meals  insulin lispro (ADMELOG) corrective regimen sliding scale   SubCutaneous at bedtime  levothyroxine 50 MICROGram(s) Oral daily  sodium chloride 0.9%. 1000 milliLiter(s) (100 mL/Hr) IV Continuous <Continuous>  tiotropium 2.5 MICROgram(s) Inhaler 2 Puff(s) Inhalation daily  vancomycin  IVPB 1750 milliGRAM(s) IV Intermittent every 12 hours    MEDICATIONS  (PRN):  acetaminophen     Tablet .. 650 milliGRAM(s) Oral every 6 hours PRN Temp greater or equal to 38C (100.4F), Mild Pain (1 - 3)  dextrose Oral Gel 15 Gram(s) Oral once PRN Blood Glucose LESS THAN 70 milliGRAM(s)/deciliter      Allergies    penicillin (Rash)  amoxicillin (Fever)    Intolerances    SOCIAL HISTORY: Lives independently, as per h&P no IVDU, + smoker. Has not worked since age 23 years.     FAMILY HISTORY:  Family history of throat cancer (Father)    Family history of leukemia (Mother)    PHYSICAL EXAM:  Vital Signs Last 24 Hrs  T(C): 36.6 (31 Jul 2023 05:18), Max: 36.9 (30 Jul 2023 13:00)  T(F): 97.9 (31 Jul 2023 05:18), Max: 98.4 (30 Jul 2023 13:00)  HR: 70 (31 Jul 2023 05:18) (70 - 80)  BP: 128/65 (31 Jul 2023 05:18) (123/65 - 138/70)  BP(mean): --  RR: 18 (31 Jul 2023 05:18) (18 - 18)  SpO2: 99% (31 Jul 2023 05:18) (94% - 100%)    Parameters below as of 31 Jul 2023 05:18  Patient On (Oxygen Delivery Method): room air    Weight (kg): 109.1 (29 Jul 2023 22:21)  BMI (kg/m2): 30.9 (29 Jul 2023 22:21)      Constitutional: NAD/AOX4/Up and margot in room, soiled underwear changed and disposable briefs provided. Patient endorses right buttock pain, primary RN providing Tylenol PO.   Disheveled  (+) low airloss support surface  HEENT:  NC/AT, non icteric, mucosa moist, throat clear, trachea midline, neck supple  Cardiovascular: Rate regular   Respiratory: Equal chest expansion   Gastrointestinal soft NT/ND  : wnl, scrotum soft, no erythema, no induration.   Neurology: sensation grossly intact, follows commands   Musculoskeletal: Up and Margot, full ROM of all fingers   Vascular: BLE equally warm, no edema   DP pulses +2 palpable  Skin:  moist w/ good turgor  Left great toe with intact yellow callus   Right second finger previous trauma wound now healed exposing 100% hypopigmentation. No surrounding erythema   Right anterior forearm with stable non draining scab measuring less than 0.5cm, no surrounding erythema.   Patient standing for initial skin inspection.   Back: no wounds   Right buttock with asymmetry, + edema (erythema in inner right buttock and perineum).   Asked patient to lay down for inspection of perineum, right inner buttock/gluteal cleft with induration erythema, tenderness entire area measuring- [id number]. Centrally in right buttock noted a pustule measuring 0.5cm0.5cm, unable to express drainage, + puss at base, patient with severe tenderness. periwound skin as noted above. No culture obtained given pustule is not draining.  No crepitus, no fluctuance.   Psych: calm and cooperative.     LABS/ CULTURES/ RADIOLOGY:                        11.1   9.33  )-----------( 182      ( 31 Jul 2023 07:05 )             33.4       130  |  93  |  10  ----------------------------<  99      [07-31-23 @ 07:05]  4.0   |  21  |  0.98        Ca     8.9     [07-31-23 @ 07:05]      Mg     2.10     [07-31-23 @ 07:05]      Phos  3.4     [07-31-23 @ 07:05]    TPro  6.5  /  Alb  3.4  /  TBili  0.2  /  DBili  x   /  AST  23  /  ALT  24  /  AlkPhos  144  [07-31-23 @ 07:05]    PT/INR: PT 16.3 , INR 1.47       [07-29-23 @ 17:00]  PTT: 24.8       [07-29-23 @ 17:00]    Culture - Blood (collected 07-29-23 @ 17:15)  Source: .Blood Blood-Peripheral  Preliminary Report (07-30-23 @ 22:02):    No growth at 24 hours    Culture - Blood (collected 07-29-23 @ 17:00)  Source: .Blood Blood-Peripheral  Preliminary Report (07-30-23 @ 22:02):    No growth at 24 hours    < from: CT Abdomen and Pelvis w/ IV Cont (07.29.23 @ 19:49) >  ACC: [id number] EXAM:  CT ABDOMEN AND PELVIS IC   ORDERED BY: MILLIE EPPS     PROCEDURE DATE:  07/29/2023          INTERPRETATION:  CLINICAL INFORMATION: Cellulitis.    COMPARISON: 7/15/2023.    CONTRAST/COMPLICATIONS:  IV Contrast: Omnipaque 350  90 cc administered   10 cc discarded  Oral Contrast: NONE  Complications: None reported at time of study completion    PROCEDURE:  CT of the Abdomen and Pelvis was performed.  Sagittal and coronal reformats were performed.    FINDINGS:  LOWER CHEST: Bibasilar atelectasis and bronchiectasis. Heart is enlarged.   Atherosclerotic calcifications of the thoracic aorta.    LIVER: Subcentimeter low-attenuation lesion in the left hepatic lobe   which is too small to characterize.  BILE DUCTS: Normal caliber.  GALLBLADDER: Within normal limits.  SPLEEN: Within normal limits.  PANCREAS: Within normal limits.  ADRENALS: Within normal limits.  KIDNEYS/URETERS: Within normal limits.    BLADDER: Distended urinary bladder with mild wall thickening.  REPRODUCTIVE ORGANS: Prostate gland is not enlarged.    BOWEL: No bowel obstruction or overt bowel wall thickening. Appendix is   normal.  PERITONEUM: Trace pelvic free fluid and mild presacral edema.  VESSELS: Atherosclerotic calcifications of the aortoiliactree. Normal   caliber abdominal aorta.  RETROPERITONEUM/LYMPH NODES: Mildly prominent subcentimeter bilateral   external iliac chain and inguinal lymph nodes.  ABDOMINAL WALL: Worsening subcutaneous edema in the right median gluteal   fold with soft tissue thickening and phlegmonous change in the perianal   region and thickening of the levator ani muscle. Gluteal region is   excluded from the image and there is regional beam hardening artifact   related to patient positioning. No definitive drainable fluid collection   is seen within the visualized region of interest. No subcutaneous gas.  BONES: Degenerative changes of the spine    IMPRESSION:  Worsening subcutaneous edema in the right median gluteal fold with soft   tissue thickening and phlegmonous change in the perianal region and   thickening of the levator ani muscle. Gluteal region is excluded from the   image and there is regional beam hardening artifact related to patient   positioning. No definitive drainable fluid collection within the   visualized region of interest or subcutaneous gas.        --- End of Report ---            VANDANA PEACOCK DO; Attending Radiologist  This document has been electronically signed. Jul 29 2023  7:57PM    < end of copied text >    Specimen Source: .Abscess Right perineum  Culture Results:   Few Methicillin Resistant Staphylococcus aureus  Organism Identification: Methicillin resistant Staphylococcus aureus  Organism: Methicillin resistant Staphylococcus aureus  Method Type: HATTIE           Wound SURGERY CONSULT NOTE    HPI:Pt is a 57M PMHx Schizoaffective D/O (Bipolar Type), HTN, HLD, Hypothyroidism, Daily Smoker, CVID/ Hypogammaglobulinemia (monthly IVIG - last 7/6/2023), T2DM, COPD, recent Lt gluteal MRSA abscess/cellulitis w/ MRSA bacteremia s/p Debridement 6/2023 presenting with worsening right buttock cellulitis. Pt states symptoms started about 4 days ago, came to ED on 7/27 and discharged on bactrim, reports compliance. Pt states he was told to return if cellulitis worsened. He reports increased redness, edema, and pain around buttock with purulent drainage. He denies fever, chills, abd pain, chest pain, SOB, n/v/d, HA or urinary symptoms.     In ED, vitals T 102.6, HR 78, /61, RR 22, O2 sat 96% on RA  Labs significant for: wbc 20.27, Hb 11.3, Na 124, BUN/Cr 11/1.47, .9  EKG personally reviewed:  CXR:  Imaging: CT a/p: IMPRESSION:  Worsening subcutaneous edema in the right median gluteal fold with soft   tissue thickening and phlegmonous change in the perianal region and   thickening of the levator ani muscle. Gluteal region is excluded from the   image and there is regional beam hardening artifact related to patient   positioning. No definitive drainable fluid collection within the   visualized region of interest or subcutaneous gas.  ED management: IV vanc and cefepime, tylenol, zofran, 1 L NS (29 Jul 2023 21:02)      Wound consult requested to assist w/ management of  right gluteal fold abscess. Patient endorses returned  to the hospital  from home for severe pain in right buttock, unsure if he had fevers but " felt cold sometimes". Otherwise no major changes in health. Reports some drainage on Friday from right buttock, but now drainage stopped. Endorses previous small tract in right perineum " closed". Endorses last BM on Friday denies constipation.     Current Diet: Diet, DASH/TLC:   Sodium & Cholesterol Restricted  Consistent Carbohydrate Evening Snack (CSTCHOSN)  1500mL Fluid Restriction ([id number]) (07-29-23 @ 22:57)      PAST MEDICAL & SURGICAL HISTORY:  Smoker  DM (diabetes mellitus)  HTN (hypertension)  Abscess of finger  Bipolar disorder  Chronic hyponatremia  CVID (common variable immunodeficiency)  Hyponatremia  Smoker  Deviated septum  Loss of teeth due to extraction  All teeth due to dental carries    REVIEW OF SYSTEMS:   General/ Skin/ see HPI and HPI   All other systems negative    MEDICATIONS  (STANDING):  atorvastatin 40 milliGRAM(s) Oral at bedtime  budesonide  80 MICROgram(s)/formoterol 4.5 MICROgram(s) Inhaler 2 Puff(s) Inhalation two times a day  dextrose 5%. 1000 milliLiter(s) (50 mL/Hr) IV Continuous <Continuous>  dextrose 50% Injectable 25 Gram(s) IV Push once  glucagon  Injectable 1 milliGRAM(s) IntraMuscular once  heparin   Injectable 5000 Unit(s) SubCutaneous every 8 hours  insulin lispro (ADMELOG) corrective regimen sliding scale   SubCutaneous three times a day before meals  insulin lispro (ADMELOG) corrective regimen sliding scale   SubCutaneous at bedtime  levothyroxine 50 MICROGram(s) Oral daily  sodium chloride 0.9%. 1000 milliLiter(s) (100 mL/Hr) IV Continuous <Continuous>  tiotropium 2.5 MICROgram(s) Inhaler 2 Puff(s) Inhalation daily  vancomycin  IVPB 1750 milliGRAM(s) IV Intermittent every 12 hours    MEDICATIONS  (PRN):  acetaminophen     Tablet .. 650 milliGRAM(s) Oral every 6 hours PRN Temp greater or equal to 38C (100.4F), Mild Pain (1 - 3)  dextrose Oral Gel 15 Gram(s) Oral once PRN Blood Glucose LESS THAN 70 milliGRAM(s)/deciliter      Allergies    penicillin (Rash)  amoxicillin (Fever)    Intolerances    SOCIAL HISTORY: Lives independently, as per h&P no IVDU, + smoker. Has not worked since age 23 years.     FAMILY HISTORY:  Family history of throat cancer (Father)    Family history of leukemia (Mother)    PHYSICAL EXAM:  Vital Signs Last 24 Hrs  T(C): 36.6 (31 Jul 2023 05:18), Max: 36.9 (30 Jul 2023 13:00)  T(F): 97.9 (31 Jul 2023 05:18), Max: 98.4 (30 Jul 2023 13:00)  HR: 70 (31 Jul 2023 05:18) (70 - 80)  BP: 128/65 (31 Jul 2023 05:18) (123/65 - 138/70)  BP(mean): --  RR: 18 (31 Jul 2023 05:18) (18 - 18)  SpO2: 99% (31 Jul 2023 05:18) (94% - 100%)    Parameters below as of 31 Jul 2023 05:18  Patient On (Oxygen Delivery Method): room air    Weight (kg): 109.1 (29 Jul 2023 22:21)  BMI (kg/m2): 30.9 (29 Jul 2023 22:21)      Constitutional: NAD/AOX4/Up and margot in room, soiled underwear changed and disposable briefs provided. Patient endorses right buttock pain, primary RN providing Tylenol PO.   Disheveled, obese   (+) low airloss support surface  HEENT:  NC/AT, non icteric, mucosa moist, throat clear, trachea midline, neck supple  Cardiovascular: Rate regular   Respiratory: Equal chest expansion, room air.   Gastrointestinal: soft NT/ND  : wnl, scrotum soft, no erythema, no induration.   Neurology: sensation grossly intact, follows commands   Musculoskeletal: Up and Margot, full ROM of all fingers and extremites  Vascular: BLE equally warm, no edema   DP pulses +2 palpable  Skin:  moist w/ good turgor  Left great toe with intact yellow callus   Left lateral lower leg soft tissue defect, healed wound.   Right second finger previous trauma wound now healed exposing 100% hypopigmentation. No surrounding erythema   Right anterior forearm with stable non draining scab measuring less than 0.5cm, no surrounding erythema, no induration.   Patient standing for initial skin inspection.   Back: no wounds   Left groin/perineum w/o induration or tenderness.   Right buttock with asymmetry, + edema (erythema, induatation in inner right buttock  and perineum).   Asked patient to lay down for inspection of perineum, right inner buttock/gluteal cleft with induration, + erythema, severe tenderness entire affected  area measuring- [id number]. Centrally in right buttock noted a pustule measuring 0.5cm0.2mle9gw, unable to express drainage, + puss at base, patient with severe tenderness. Periwound skin as noted above. No culture obtained given pustule is not draining.  No crepitus, no fluctuance.   Psych: calm and cooperative.     LABS/ CULTURES/ RADIOLOGY:                        11.1   9.33  )-----------( 182      ( 31 Jul 2023 07:05 )             33.4       130  |  93  |  10  ----------------------------<  99      [07-31-23 @ 07:05]  4.0   |  21  |  0.98        Ca     8.9     [07-31-23 @ 07:05]      Mg     2.10     [07-31-23 @ 07:05]      Phos  3.4     [07-31-23 @ 07:05]    TPro  6.5  /  Alb  3.4  /  TBili  0.2  /  DBili  x   /  AST  23  /  ALT  24  /  AlkPhos  144  [07-31-23 @ 07:05]    PT/INR: PT 16.3 , INR 1.47       [07-29-23 @ 17:00]  PTT: 24.8       [07-29-23 @ 17:00]    Culture - Blood (collected 07-29-23 @ 17:15)  Source: .Blood Blood-Peripheral  Preliminary Report (07-30-23 @ 22:02):    No growth at 24 hours    Culture - Blood (collected 07-29-23 @ 17:00)  Source: .Blood Blood-Peripheral  Preliminary Report (07-30-23 @ 22:02):    No growth at 24 hours    < from: CT Abdomen and Pelvis w/ IV Cont (07.29.23 @ 19:49) >  ACC: [id number] EXAM:  CT ABDOMEN AND PELVIS IC   ORDERED BY: MILLIE EPPS     PROCEDURE DATE:  07/29/2023          INTERPRETATION:  CLINICAL INFORMATION: Cellulitis.    COMPARISON: 7/15/2023.    CONTRAST/COMPLICATIONS:  IV Contrast: Omnipaque 350  90 cc administered   10 cc discarded  Oral Contrast: NONE  Complications: None reported at time of study completion    PROCEDURE:  CT of the Abdomen and Pelvis was performed.  Sagittal and coronal reformats were performed.    FINDINGS:  LOWER CHEST: Bibasilar atelectasis and bronchiectasis. Heart is enlarged.   Atherosclerotic calcifications of the thoracic aorta.    LIVER: Subcentimeter low-attenuation lesion in the left hepatic lobe   which is too small to characterize.  BILE DUCTS: Normal caliber.  GALLBLADDER: Within normal limits.  SPLEEN: Within normal limits.  PANCREAS: Within normal limits.  ADRENALS: Within normal limits.  KIDNEYS/URETERS: Within normal limits.    BLADDER: Distended urinary bladder with mild wall thickening.  REPRODUCTIVE ORGANS: Prostate gland is not enlarged.    BOWEL: No bowel obstruction or overt bowel wall thickening. Appendix is   normal.  PERITONEUM: Trace pelvic free fluid and mild presacral edema.  VESSELS: Atherosclerotic calcifications of the aortoiliactree. Normal   caliber abdominal aorta.  RETROPERITONEUM/LYMPH NODES: Mildly prominent subcentimeter bilateral   external iliac chain and inguinal lymph nodes.  ABDOMINAL WALL: Worsening subcutaneous edema in the right median gluteal   fold with soft tissue thickening and phlegmonous change in the perianal   region and thickening of the levator ani muscle. Gluteal region is   excluded from the image and there is regional beam hardening artifact   related to patient positioning. No definitive drainable fluid collection   is seen within the visualized region of interest. No subcutaneous gas.  BONES: Degenerative changes of the spine    IMPRESSION:  Worsening subcutaneous edema in the right median gluteal fold with soft   tissue thickening and phlegmonous change in the perianal region and   thickening of the levator ani muscle. Gluteal region is excluded from the   image and there is regional beam hardening artifact related to patient   positioning. No definitive drainable fluid collection within the   visualized region of interest or subcutaneous gas.        --- End of Report ---            VANDANA PEACOCK DO; Attending Radiologist  This document has been electronically signed. Jul 29 2023  7:57PM    < end of copied text >    Specimen Source: .Abscess Right perineum  Culture Results:   Few Methicillin Resistant Staphylococcus aureus  Organism Identification: Methicillin resistant Staphylococcus aureus  Organism: Methicillin resistant Staphylococcus aureus  Method Type: HATTIE

## 2023-07-31 NOTE — PROGRESS NOTE ADULT - ASSESSMENT
57 M PMHx Schizoaffective D/O (Bipolar Type), HTN, HLD, Hypothyroidism, Daily Smoker, CVID/ Hypogammaglobulinemia (monthly IVIG - last 7/6/2023), T2DM, COPD, recent Lt gluteal MRSA abscess/cellulitis w/ MRSA bacteremia s/p Debridement 6/2023 presenting with worsening right buttock cellulitis  Fever, leukocytosis  Prior MRSA bacteremia  Prior MRSA wound infection  Multiple episodes of recurring abscess/gluteal infection 2/2 MRSA  CT worsening edema R gluteal fold, phlegmonous change perineal, no drainable lesion (7/29)  UA neg  Gluteal SSTI  Overall, Fever, SSTI recurring, hypogammaglobulinemia  - Vanco 1750mg q 12 (continue--monitor levels, trough 10.2 adequate)  - F/U BCXs  - Surgical eval to wound/wound care  - Trend WBC to normal  - Monitor for further fevers  - Monitor for improvement  - Considering multiple recurrence, possible suppression once treatment course completed    Josr Downs MD  Contact on TEAMS messaging from 9am - 5pm  From 5pm-9am, on weekends, or if no response call [phone]

## 2023-07-31 NOTE — PROGRESS NOTE ADULT - PROBLEM SELECTOR PLAN 2
Patient with hx of chronic hyponatremia. Na 124, previously due to polydipsia. Also recently on bactrim, now discontinued. S/p 1 L NS in ED. Na today 127, and patient reports poor PO intake over the past week. Uosm 342, Farrukh <20.  - Continue to monitor  - NS @100cc/hr x10h ordered today Patient with hx of chronic hyponatremia. Na 124, previously due to polydipsia. Also recently on bactrim, now discontinued. S/p 1 L NS in ED. Na today 127, and patient reports poor PO intake over the past week. Uosm 342, Farrukh <20.  - Continue to monitor

## 2023-07-31 NOTE — PROGRESS NOTE ADULT - PROBLEM SELECTOR PLAN 3
Cr 1.46 --> 1.22, up from baseline of 0.8. FeNa 0.2% suggested pre-renal etiology, likely secondary to sepsis and poor PO intake.  - NS 100cc/h x10h ordered  - monitor Cr  - avoid nephrotoxins, renally dose meds Cr 1.46 on admission, up from baseline of 0.8. FeNa 0.2% suggested pre-renal etiology, likely secondary to sepsis and poor PO intake. S/p 1L NS, now back to baseline (0.98 7/31/23).  - continue to monitor Cr  - avoid nephrotoxins, renally dose meds

## 2023-07-31 NOTE — PROGRESS NOTE ADULT - SUBJECTIVE AND OBJECTIVE BOX
DATE OF SERVICE: 07-31-23 @ 07:57    Patient is a 57y old  Male who presents with a chief complaint of sepsis (30 Jul 2023 12:12)      SUBJECTIVE / OVERNIGHT EVENTS:    MEDICATIONS  (STANDING):  atorvastatin 40 milliGRAM(s) Oral at bedtime  budesonide  80 MICROgram(s)/formoterol 4.5 MICROgram(s) Inhaler 2 Puff(s) Inhalation two times a day  dextrose 5%. 1000 milliLiter(s) (50 mL/Hr) IV Continuous <Continuous>  dextrose 50% Injectable 25 Gram(s) IV Push once  glucagon  Injectable 1 milliGRAM(s) IntraMuscular once  heparin   Injectable 5000 Unit(s) SubCutaneous every 8 hours  insulin lispro (ADMELOG) corrective regimen sliding scale   SubCutaneous at bedtime  insulin lispro (ADMELOG) corrective regimen sliding scale   SubCutaneous three times a day before meals  levothyroxine 50 MICROGram(s) Oral daily  sodium chloride 0.9%. 1000 milliLiter(s) (100 mL/Hr) IV Continuous <Continuous>  tiotropium 2.5 MICROgram(s) Inhaler 2 Puff(s) Inhalation daily  vancomycin  IVPB 1750 milliGRAM(s) IV Intermittent every 12 hours    MEDICATIONS  (PRN):  acetaminophen     Tablet .. 650 milliGRAM(s) Oral every 6 hours PRN Temp greater or equal to 38C (100.4F), Mild Pain (1 - 3)  dextrose Oral Gel 15 Gram(s) Oral once PRN Blood Glucose LESS THAN 70 milliGRAM(s)/deciliter      Vital Signs Last 24 Hrs  T(C): 36.6 (31 Jul 2023 05:18), Max: 36.9 (30 Jul 2023 13:00)  T(F): 97.9 (31 Jul 2023 05:18), Max: 98.4 (30 Jul 2023 13:00)  HR: 70 (31 Jul 2023 05:18) (70 - 80)  BP: 128/65 (31 Jul 2023 05:18) (123/65 - 138/70)  BP(mean): --  RR: 18 (31 Jul 2023 05:18) (18 - 18)  SpO2: 99% (31 Jul 2023 05:18) (94% - 100%)    Parameters below as of 31 Jul 2023 05:18  Patient On (Oxygen Delivery Method): room air      CAPILLARY BLOOD GLUCOSE      POCT Blood Glucose.: 119 mg/dL (30 Jul 2023 22:23)  POCT Blood Glucose.: 104 mg/dL (30 Jul 2023 18:09)  POCT Blood Glucose.: 181 mg/dL (30 Jul 2023 12:06)  POCT Blood Glucose.: 129 mg/dL (30 Jul 2023 08:25)    I&O's Summary      PHYSICAL EXAM:  GENERAL: NAD, well-developed  HEAD:  Atraumatic, Normocephalic  EYES: EOMI, PERRLA, conjunctiva and sclera clear  NECK: Supple, No JVD  CHEST/LUNG: Clear to auscultation bilaterally; No wheeze  HEART: Regular rate and rhythm; No murmurs, rubs, or gallops  ABDOMEN: Soft, Nontender, Nondistended; Bowel sounds present  EXTREMITIES:  2+ Peripheral Pulses, No clubbing, cyanosis, or edema  PSYCH: AAOx3  NEUROLOGY: non-focal  SKIN: No rashes or lesions    LABS:        RADIOLOGY & ADDITIONAL TESTS:    Imaging Personally Reviewed:    Consultant(s) Notes Reviewed:      Care Discussed with Consultants/Other Providers:   DATE OF SERVICE: 07-31-23 @ 07:57    Patient is a 57y old  Male who presents with a chief complaint of sepsis (30 Jul 2023 12:12)      SUBJECTIVE / OVERNIGHT EVENTS:  Patient continues to endorse limited appetite with nausea. Pain unchanged.  States that his father had a history of similar skin "boils"  Clarified that he took bactrim for 2-3 days post-discharge from previous hospitalization for cellulitis    MEDICATIONS  (STANDING):  atorvastatin 40 milliGRAM(s) Oral at bedtime  budesonide  80 MICROgram(s)/formoterol 4.5 MICROgram(s) Inhaler 2 Puff(s) Inhalation two times a day  dextrose 5%. 1000 milliLiter(s) (50 mL/Hr) IV Continuous <Continuous>  dextrose 50% Injectable 25 Gram(s) IV Push once  glucagon  Injectable 1 milliGRAM(s) IntraMuscular once  heparin   Injectable 5000 Unit(s) SubCutaneous every 8 hours  insulin lispro (ADMELOG) corrective regimen sliding scale   SubCutaneous at bedtime  insulin lispro (ADMELOG) corrective regimen sliding scale   SubCutaneous three times a day before meals  levothyroxine 50 MICROGram(s) Oral daily  sodium chloride 0.9%. 1000 milliLiter(s) (100 mL/Hr) IV Continuous <Continuous>  tiotropium 2.5 MICROgram(s) Inhaler 2 Puff(s) Inhalation daily  vancomycin  IVPB 1750 milliGRAM(s) IV Intermittent every 12 hours    MEDICATIONS  (PRN):  acetaminophen     Tablet .. 650 milliGRAM(s) Oral every 6 hours PRN Temp greater or equal to 38C (100.4F), Mild Pain (1 - 3)  dextrose Oral Gel 15 Gram(s) Oral once PRN Blood Glucose LESS THAN 70 milliGRAM(s)/deciliter      Vital Signs Last 24 Hrs  T(C): 36.6 (31 Jul 2023 05:18), Max: 36.9 (30 Jul 2023 13:00)  T(F): 97.9 (31 Jul 2023 05:18), Max: 98.4 (30 Jul 2023 13:00)  HR: 70 (31 Jul 2023 05:18) (70 - 80)  BP: 128/65 (31 Jul 2023 05:18) (123/65 - 138/70)  BP(mean): --  RR: 18 (31 Jul 2023 05:18) (18 - 18)  SpO2: 99% (31 Jul 2023 05:18) (94% - 100%)    Parameters below as of 31 Jul 2023 05:18  Patient On (Oxygen Delivery Method): room air      CAPILLARY BLOOD GLUCOSE  POCT Blood Glucose.: 119 mg/dL (30 Jul 2023 22:23)  POCT Blood Glucose.: 104 mg/dL (30 Jul 2023 18:09)  POCT Blood Glucose.: 181 mg/dL (30 Jul 2023 12:06)  POCT Blood Glucose.: 129 mg/dL (30 Jul 2023 08:25)    PHYSICAL EXAM:  GENERAL: NAD, well-developed  HEAD:  Atraumatic, Normocephalic  EYES: conjunctiva and sclera clear  NECK: Supple, No JVD  CHEST/LUNG: Clear to auscultation bilaterally; No wheeze  HEART: Regular rate and rhythm; No murmurs, rubs, or gallops  ABDOMEN: Soft, Nontender, Nondistended; Bowel sounds present  EXTREMITIES: No cyanosis or edema  PSYCH: AAOx3  NEUROLOGY: non-focal  SKIN: Large erythematous region of induration extending from R gluteal region past gluteal cleft. Pain on palpation.        LABS:                        11.1   9.33  )-----------( 182      ( 31 Jul 2023 07:05 )             33.4     07-31    130<L>  |  93<L>  |  10  ----------------------------<  99  4.0   |  21<L>  |  0.98    Ca    8.9      31 Jul 2023 07:05  Phos  3.4     07-31  Mg     2.10     07-31    TPro  6.5  /  Alb  3.4  /  TBili  0.2  /  DBili  x   /  AST  23  /  ALT  24  /  AlkPhos  144<H>  07-31    PT/INR - ( 29 Jul 2023 17:00 )   PT: 16.3 sec;   INR: 1.47 ratio         PTT - ( 29 Jul 2023 17:00 )  PTT:24.8 sec      Urinalysis Basic - ( 31 Jul 2023 07:05 )  Color: x / Appearance: x / SG: x / pH: x  Gluc: 99 mg/dL / Ketone: x  / Bili: x / Urobili: x   Blood: x / Protein: x / Nitrite: x   Leuk Esterase: x / RBC: x / WBC x   Sq Epi: x / Non Sq Epi: x / Bacteria: x      RADIOLOGY & ADDITIONAL TESTS: no new imaging    Imaging Personally Reviewed: n/a  Consultant(s) Notes Reviewed:  yes  Care Discussed with Consultants/Other Providers: yes

## 2023-07-31 NOTE — PROGRESS NOTE ADULT - PROBLEM SELECTOR PROBLEM 5
----- Message from Shari Hughes sent at 7/12/2017  2:16 PM CDT -----  Contact: mom [phone]    Mom needs pt immunization record, please mail to mom. Address updated in chart.     HTN (hypertension)

## 2023-07-31 NOTE — CONSULT NOTE ADULT - SUBJECTIVE AND OBJECTIVE BOX
GENERAL SURGERY CONSULT NOTE  HPI: Pt is a 57M PMHx Schizoaffective D/O (Bipolar Type), HTN, HLD, Hypothyroidism, Daily Smoker, CVID/ Hypogammaglobulinemia (monthly IVIG - last 7/6/2023), T2DM, COPD, recent Lt gluteal MRSA abscess/cellulitis w/ MRSA bacteremia s/p Debridement 6/2023 presenting with worsening right buttock cellulitis. Pt states symptoms started about 4 days ago, came to ED on 7/27 and discharged on bactrim, reports compliance. Pt states he was told to return if cellulitis worsened. He reports increased redness, edema, and pain around buttock with purulent drainage. He denies fever, chills, abd pain, chest pain, SOB, n/v/d, HA or urinary symptoms.     In ED, vitals T 102.6, HR 78, /61, RR 22, O2 sat 96% on RA  Labs significant for: wbc 20.27, Hb 11.3, Na 124, BUN/Cr 11/1.47, .9  EKG personally reviewed:  CXR:  Imaging: CT a/p: IMPRESSION:  Worsening subcutaneous edema in the right median gluteal fold with soft   tissue thickening and phlegmonous change in the perianal region and   thickening of the levator ani muscle. Gluteal region is excluded from the   image and there is regional beam hardening artifact related to patient   positioning. No definitive drainable fluid collection within the   visualized region of interest or subcutaneous gas.  ED management: IV vanc and cefepime, tylenol, zofran, 1 L NS (29 Jul 2023 21:02)      General surgery consulted for evaluation of gluteus    PAST MEDICAL & SURGICAL HISTORY:  Smoker      DM (diabetes mellitus)      HTN (hypertension)      Abscess of finger      Bipolar disorder      Chronic hyponatremia      CVID (common variable immunodeficiency)      Hyponatremia      Smoker      Deviated septum      Loss of teeth due to extraction  All teeth due to dental carries          MEDICATIONS  (STANDING):  atorvastatin 40 milliGRAM(s) Oral at bedtime  budesonide  80 MICROgram(s)/formoterol 4.5 MICROgram(s) Inhaler 2 Puff(s) Inhalation two times a day  dextrose 5%. 1000 milliLiter(s) (50 mL/Hr) IV Continuous <Continuous>  dextrose 50% Injectable 25 Gram(s) IV Push once  glucagon  Injectable 1 milliGRAM(s) IntraMuscular once  heparin   Injectable 5000 Unit(s) SubCutaneous every 8 hours  insulin lispro (ADMELOG) corrective regimen sliding scale   SubCutaneous three times a day before meals  insulin lispro (ADMELOG) corrective regimen sliding scale   SubCutaneous at bedtime  levothyroxine 50 MICROGram(s) Oral daily  sodium chloride 0.9%. 1000 milliLiter(s) (100 mL/Hr) IV Continuous <Continuous>  tiotropium 2.5 MICROgram(s) Inhaler 2 Puff(s) Inhalation daily  vancomycin  IVPB 1750 milliGRAM(s) IV Intermittent every 12 hours    MEDICATIONS  (PRN):  acetaminophen     Tablet .. 650 milliGRAM(s) Oral every 6 hours PRN Temp greater or equal to 38C (100.4F), Mild Pain (1 - 3)  dextrose Oral Gel 15 Gram(s) Oral once PRN Blood Glucose LESS THAN 70 milliGRAM(s)/deciliter      Allergies    penicillin (Rash)  amoxicillin (Fever)    Intolerances      SOCIAL HISTORY:   · Alcohol use	denies  · Substance use	denies  · Tobacco use	smokes 1/2 ppd      FAMILY HISTORY:  Family history of throat cancer (Father)    Family history of leukemia (Mother)        Physical Exam:  General: NAD, resting comfortably  HEENT: NC/AT, EOMI, normal hearing, no oral lesions, no LAD, neck supple  Pulmonary: normal resp effort on RA  Cardiovascular: RRR  Abdominal: soft, ND/NT  Rectal: UZMA Deferred  Gluteal: Area of significant erythema, warmth, and induration extending from left mid buttock to gluteal cleft. No fluctuance. Small pustule/punctum without active draining, no fluid able to be expressed      Vital Signs Last 24 Hrs  T(C): 36.7 (31 Jul 2023 13:10), Max: 36.7 (30 Jul 2023 21:18)  T(F): 98.1 (31 Jul 2023 13:10), Max: 98.1 (31 Jul 2023 13:10)  HR: 66 (31 Jul 2023 13:10) (66 - 80)  BP: 122/76 (31 Jul 2023 13:10) (122/76 - 138/70)  BP(mean): --  RR: 18 (31 Jul 2023 13:10) (18 - 18)  SpO2: 100% (31 Jul 2023 13:10) (94% - 100%)    Parameters below as of 31 Jul 2023 13:10  Patient On (Oxygen Delivery Method): room air        I&O's Summary          LABS:                        11.1   9.33  )-----------( 182      ( 31 Jul 2023 07:05 )             33.4     07-31    130<L>  |  93<L>  |  10  ----------------------------<  99  4.0   |  21<L>  |  0.98    Ca    8.9      31 Jul 2023 07:05  Phos  3.4     07-31  Mg     2.10     07-31    TPro  6.5  /  Alb  3.4  /  TBili  0.2  /  DBili  x   /  AST  23  /  ALT  24  /  AlkPhos  144<H>  07-31      Urinalysis Basic - ( 31 Jul 2023 07:05 )    Color: x / Appearance: x / SG: x / pH: x  Gluc: 99 mg/dL / Ketone: x  / Bili: x / Urobili: x   Blood: x / Protein: x / Nitrite: x   Leuk Esterase: x / RBC: x / WBC x   Sq Epi: x / Non Sq Epi: x / Bacteria: x      CAPILLARY BLOOD GLUCOSE      POCT Blood Glucose.: 172 mg/dL (31 Jul 2023 12:14)  POCT Blood Glucose.: 114 mg/dL (31 Jul 2023 08:17)  POCT Blood Glucose.: 119 mg/dL (30 Jul 2023 22:23)  POCT Blood Glucose.: 104 mg/dL (30 Jul 2023 18:09)    LIVER FUNCTIONS - ( 31 Jul 2023 07:05 )  Alb: 3.4 g/dL / Pro: 6.5 g/dL / ALK PHOS: 144 U/L / ALT: 24 U/L / AST: 23 U/L / GGT: x             Cultures:      RADIOLOGY & ADDITIONAL STUDIES:  < from: CT Abdomen and Pelvis w/ IV Cont (07.29.23 @ 19:49) >  FINDINGS:  LOWER CHEST: Bibasilar atelectasis and bronchiectasis. Heart is enlarged.   Atherosclerotic calcifications of the thoracic aorta.    LIVER: Subcentimeter low-attenuation lesion in the left hepatic lobe   which is too small to characterize.  BILE DUCTS: Normal caliber.  GALLBLADDER: Within normal limits.  SPLEEN: Within normal limits.  PANCREAS: Within normal limits.  ADRENALS: Within normal limits.  KIDNEYS/URETERS: Within normal limits.    BLADDER: Distended urinary bladder with mild wall thickening.  REPRODUCTIVE ORGANS: Prostate gland is not enlarged.    BOWEL: No bowel obstruction or overt bowel wall thickening. Appendix is   normal.  PERITONEUM: Trace pelvic free fluid and mild presacral edema.  VESSELS: Atherosclerotic calcifications of the aortoiliactree. Normal   caliber abdominal aorta.  RETROPERITONEUM/LYMPH NODES: Mildly prominent subcentimeter bilateral   external iliac chain and inguinal lymph nodes.  ABDOMINAL WALL: Worsening subcutaneous edema in the right median gluteal   fold with softtissue thickening and phlegmonous change in the perianal   region and thickening of the levator ani muscle. Gluteal region is   excluded from the image and there is regional beam hardening artifact   related to patient positioning. No definitive drainable fluid collection   is seen within the visualized region of interest. No subcutaneous gas.  BONES: Degenerative changes of the spine    IMPRESSION:  Worsening subcutaneous edema in the right median gluteal fold with soft   tissue thickening and phlegmonous change in the perianal region and   thickening of the levator ani muscle. Gluteal region is excluded from the   image and there is regional beam hardening artifact related to patient   positioning. No definitive drainable fluid collection within the   visualized region of interest or subcutaneous gas.    < end of copied text >

## 2023-07-31 NOTE — CONSULT NOTE ADULT - ATTENDING COMMENTS
I have reviewed the history, pertinent labs and imaging, and discussed the care with the consult resident.  More than 50% of this 55 minute encounter including face to face with the patient was spent counseling and/or coordination of care on gluteal cellulitis.  Time included review of vitals, labs, imaging, discussion with consultants and coordination with the operating room/emergency department.    56yo M with hypogammaglobulinemia and h/o gluteal debridement presents with recurrent R gluteal pain. Reports purulent drainage from area yesterday and day prior, however minimal drainage today. Cellulitic and indurated, however without fluctuance. No evidence of abscess on CT.     - Cont abx per ID at this time  - monitor clinically for development of abscess   - no indication for debridement at this time    The active issues are:  1. gluteal cellulitis   2. immunosuppression    The Acute Care Surgery (B Team) Attending Group Practice:  Dr. Ifeoma Soriano    urgent issues - spectra [id number]  nonurgent issues - [phone]  patient appointments or afterhours - [phone]

## 2023-07-31 NOTE — PROGRESS NOTE ADULT - PROBLEM SELECTOR PLAN 1
Patient presenting with worsening buttock cellulitis. CT abd/pelvis w/o evidence of any drainable collection or subcutaneous gas. S/p vanc and cefepime in ED.  - continue IV vanc 1750mg q12h x7d (first dose 7/29)  - f/u BCx  - ID consult, appreciate recs  - Wound care consult Patient presenting with worsening buttock cellulitis; patient has presented with buttock SSTI 4x in the last 4 months. CT abd/pelvis w/o evidence of any drainable collection or subcutaneous gas. S/p vanc and cefepime in ED, now on vancomycin. ID and wound care following, appreciate recs. Vanc trough 10.2 this AM, will maintain current dose per ID.  - continue IV vanc 1750mg q12h (first dose 7/29)  - f/u BCx  - gen surg consult for eval per wound care recs Patient presenting with worsening buttock cellulitis; patient has presented with buttock SSTI 4x in the last 4 months. CT abd/pelvis w/o evidence of any drainable collection or subcutaneous gas. S/p vanc and cefepime in ED, now on vancomycin. ID and wound care following, appreciate recs. Vanc trough 10.2 this AM, will maintain current dose per ID. BCx neg at 24h.  - continue IV vanc 1750mg q12h (first dose 7/29)  - gen surg consult for eval per wound care recs

## 2023-07-31 NOTE — CONSULT NOTE ADULT - ASSESSMENT
Assessment/Plan: Mr. Cristina is a 57y M with PMH Schizoaffective D/O (Bipolar Type), HTN, HLD, Hypothyroidism, Daily Smoker, CVID/ Hypogammaglobulinemia (monthly IVIG - last 7/6/2023), T2DM, COPD, recent Lt gluteal MRSA abscess/cellulitis w/ MRSA bacteremia s/p Debridement 6/2023 presenting with worsening right buttock cellulitis. Pt found to be septic likely 2/2 buttock cellulitis. Pt admitted for further management.     Wound Consult requested to assist w/ management of Right gluteal fold. Patient known to wound care surgical services seen multiple times in previous admission and [facility]. Patient last seen on 7/17 for right perineum  punctate opening draining seropurulent with surrounding induration, tenderness, and erythema.  Surgery consulted and evaluated during previous admission and no surgical interventions deemed.   Of note, patient was discharge on 7/21 to home, patient decline VNS for wound care.     Right buttock, inner groin, gluteal cleft, extending to perineum erythema, induration, severe tenderness   -CT of the ABD and pelvis: Worsening subcutaneous edema in the right median gluteal fold with soft tissue thickening and phlegmonous change in the perianal region and thickening of the levator ani muscle. Gluteal region is excluded from the image and there is regional beam hardening artifact related to patient positioning. No definitive drainable fluid collection within the visualized region of interest or subcutaneous gas.  -+Surrounding cellulitis, no open wounds   -Recommend surgery consult to evaluate, noted pustule with puss at base. No drainage able to be express. No crepitus, no palpable abscess on clinical exam consistent with CT scan.   -WBC wnl (yesterday leukocytosis responding to IV antibiotics), patient afebrile   -Patient with hx of MRSA on previous cultures to right perineum   -Pustule to right buttock centrally, not draining.   -Abx per Medicine as per ID  -hemoglobin a1c 5.2%      Additional Recs   consider MVI & Vit C to promote wound healing  encourage high quality protein when appropriate  Continue turning and positioning w/ offloading assistive devices as per protocol  Continue w/ Pericare as per protocol  Waffle Cushion to chair when oob to chair  Continue w/ low air loss fluidized bed surface     Care as per medicine, will follow w/ you. Please reconsult earlier if needed     Upon discharge f/u as outpatient at [facility] [address] [facility] [phone]  D/w team 4 MD     Thank you for this consult  MADELAINE Bowie-BC, CWREJIN (pager #[phone] or available in MS teams)    If after 4PM or before 7:30AM on Mon-Friday or weekend/holiday please contact general surgery for urgent matters.   Team A- [id number]   Team B- [id number]  For non-urgent matters e-mail [email or web address]    I spent 55 minutes face to face with this patient of which more than 50% of the time was spent counseling & coordinating care of this pt       Assessment/Plan: Mr. Cristina is a 57y M with PMH Schizoaffective D/O (Bipolar Type), HTN, HLD, Hypothyroidism, Daily Smoker, CVID/ Hypogammaglobulinemia (monthly IVIG - last 7/6/2023), T2DM, COPD, recent Lt gluteal MRSA abscess/cellulitis w/ MRSA bacteremia s/p Debridement 6/2023 presenting with worsening right buttock cellulitis. Pt found to be septic likely 2/2 buttock cellulitis. Pt admitted for further management.     Wound Consult requested to assist w/ management of Right gluteal fold. Patient known to wound care surgical services seen multiple times in previous admissions (see chronology below) and [facility]. Patient last seen on 7/17 for right perineum punctate opening draining seropurulent with surrounding induration, tenderness, and erythema.  Surgery consulted and evaluated during previous admission and no surgical interventions deemed. Patient received IV antibiotics as per ID notes "Recently admitted for same issue, abscess with MRSA without bacteremia, treated for 7 days with Bactrim".   Of note, patient was discharged on 7/21 to home, patient decline VNS for wound care.   Wound care visits during inpatient since 2/6/2023 initially for left leg chronic wound- Now healed   3/20/23- Left buttock wound   5/9/23-Right buttock wound   7/17/23-Right perineum punctuate wound    Right buttock, inner groin, gluteal cleft, extending to perineum erythema, induration, severe tenderness recurrent cellulitis   -CT of the ABD and pelvis: Worsening subcutaneous edema in the right median gluteal fold with soft tissue thickening and phlegmonous change in the perianal region and thickening of the levator ani muscle. Gluteal region is excluded from the image and there is regional beam hardening artifact related to patient positioning. No definitive drainable fluid collection within the visualized region of interest or subcutaneous gas.  -+Surrounding cellulitis, no open wounds   -No drainage at the time assessment   -Recommend surgery consult to evaluate, noted pustule with puss at base. No drainage able to be express. No crepitus, no palpable abscess on clinical exam consistent with CT scan findings.   -WBC wnl today (yesterday leukocytosis responding to IV antibiotics), patient afebrile   -Patient with hx of MRSA on previous cultures to right perineum   -Pustule to right buttock centrally, not draining.   -Abx per Medicine as per ID  -hemoglobin a1c 5.2%  -Right buttock pustule, gluteal cleft, perineum erythema, induration and tenderess, , keep area clean and dry. Monitor for tissue type changes. Place Interdry textile dressing sheeting to wick moisture, change every day or PRN if soiled. Provide patient with disposable briefs, Change daily or as needed. Monitor for tissue type changes.    Right forearm non draining scab w/o surrounding erythema or induration   -->Right forearm non draining scab: Clean with NS, pat dry. Cover with silicone foam with border. Change every other day or prn if soiled.       Additional Recs   Left leg healed wound and right second finger hypopigmentation (healed wounds), monitor for tissue type changes, keep clean and dry.   consider MVI & Vit C to promote wound healing  encourage high quality protein when appropriate  Continue turning and positioning w/ offloading assistive devices as per protocol  Continue w/ Pericare as per protocol  Waffle Cushion to chair when oob to chair  Continue w/ low air loss fluidized bed surface     Care as per medicine, will follow w/ you. Please reconsult earlier if needed     **Upon discharge f/u as outpatient at [facility] [address] [place], [place] [phone]  D/w team 4 MD     Thank you for this consult  MADELAINE Bowie-BC, CWOCN (pager #[phone] or available in MS teams)    If after 4PM or before 7:30AM on Mon-Friday or weekend/holiday please contact general surgery for urgent matters.   Team A- [id number]   Team B- [id number]  For non-urgent matters e-mail [email or web address]    I spent 55 minutes face to face with this patient of which more than 50% of the time was spent counseling & coordinating care of this pt       Assessment/Plan: Mr. Cristina is a 57y M with PMH Schizoaffective D/O (Bipolar Type), HTN, HLD, Hypothyroidism, Daily Smoker, CVID/ Hypogammaglobulinemia (monthly IVIG - last 7/6/2023), T2DM, COPD, recent Lt gluteal MRSA abscess/cellulitis w/ MRSA bacteremia s/p Debridement 6/2023 presenting with worsening right buttock cellulitis. Pt found to be septic likely 2/2 buttock cellulitis. Pt admitted for further management.     Wound Consult requested to assist w/ management of Right gluteal fold. Patient known to wound care surgical services seen multiple times in previous admissions (see chronology below) and [facility]. Patient last seen on 7/17 for right perineum punctate opening draining seropurulent with surrounding induration, tenderness, and erythema.  Surgery consulted and evaluated during previous admission and no surgical interventions deemed. Patient received IV antibiotics as per ID notes "Recently admitted for same issue, abscess with MRSA without bacteremia, treated for 7 days with Bactrim".   Of note, patient was discharged on 7/21 to home, patient decline VNS for wound care.   Wound care visits during inpatient since 2/6/2023 initially for left leg chronic wound- Now healed   3/20/23- Left buttock wound   5/9/23-Right buttock wound   7/17/23-Right perineum punctuate wound    Right buttock, inner groin, gluteal cleft, extending to perineum erythema, induration, severe tenderness recurrent cellulitis   -CT of the ABD and pelvis: Worsening subcutaneous edema in the right median gluteal fold with soft tissue thickening and phlegmonous change in the perianal region and thickening of the levator ani muscle. Gluteal region is excluded from the image and there is regional beam hardening artifact related to patient positioning. No definitive drainable fluid collection within the visualized region of interest or subcutaneous gas.  -+Surrounding cellulitis, no open wounds   -No drainage at the time assessment   -Recommend surgery consult to evaluate, noted pustule with puss at base. No drainage able to be express. No crepitus, no palpable abscess on clinical exam consistent with CT scan findings.   -WBC wnl today (yesterday leukocytosis responding to IV antibiotics), patient afebrile   -Patient with hx of MRSA on previous cultures to right perineum   -Pustule to right buttock centrally, not draining.   -Abx per Medicine as per ID  -hemoglobin a1c 5.2%  -Right buttock pustule, gluteal cleft, perineum erythema, induration and tenderess, , keep area clean and dry. Monitor for tissue type changes. Place Interdry textile dressing sheeting to wick moisture, change every day or PRN if soiled. Provide patient with disposable briefs, Change daily or as needed. Monitor for tissue type changes.    Right forearm non draining scab w/o surrounding erythema or induration   -->Right forearm non draining scab: Clean with NS, pat dry. Cover with silicone foam with border. Change every other day or prn if soiled.   -Monitor for tissue type changes      Hypogammaglobulinemia   -Management asper primary team   -Patient with   -IVG monthly   -Patient f/u with Immunologist outpatient noted last telehealth visit on 5/8/23         Additional Recs   Left leg healed wound and right second finger hypopigmentation (healed wounds), monitor for tissue type changes, keep clean and dry.   consider MVI & Vit C to promote wound healing  encourage high quality protein when appropriate  Continue turning and positioning w/ offloading assistive devices as per protocol  Continue w/ Pericare as per protocol  Waffle Cushion to chair when oob to chair  Continue w/ low air loss fluidized bed surface     Care as per medicine, will follow w/ you. Please reconsult earlier if needed     **Upon discharge f/u as outpatient at [facility] [address] [place], [place] [phone]  D/w team 4 MD     Thank you for this consult  MADELAINE Bowie-BC, CWOCN (pager #[phone] or available in MS teams)    If after 4PM or before 7:30AM on Mon-Friday or weekend/holiday please contact general surgery for urgent matters.   Team A- [id number]   Team B- [id number]  For non-urgent matters e-mail [email or web address]    I spent 55 minutes face to face with this patient of which more than 50% of the time was spent counseling & coordinating care of this pt       Assessment/Plan: Mr. Cristina is a 57y M with PMH Schizoaffective D/O (Bipolar Type), HTN, HLD, Hypothyroidism, Daily Smoker, CVID/ Hypogammaglobulinemia (monthly IVIG - last 7/6/2023), T2DM, COPD, recent Lt gluteal MRSA abscess/cellulitis w/ MRSA bacteremia s/p Debridement 6/2023 presenting with worsening right buttock cellulitis. Pt found to be septic likely 2/2 buttock cellulitis. Pt admitted for further management.     Wound Consult requested to assist w/ management of Right gluteal fold. Patient known to wound care surgical services seen multiple times in previous admissions (see chronology below) and [facility]. Patient last seen on 7/17 for right perineum punctate opening draining seropurulent with surrounding induration, tenderness, and erythema.  Surgery consulted and evaluated during previous admission and no surgical interventions deemed. Patient received IV antibiotics as per ID notes "Recently admitted for same issue, abscess with MRSA without bacteremia, treated for 7 days with Bactrim".   Of note, patient was discharged on 7/21 to home, patient decline VNS for wound care.   Wound care visits during inpatient since 2/6/2023 initially for left leg chronic wound- Now healed   3/20/23- Left buttock wound   5/9/23-Right buttock wound   7/17/23-Right perineum punctuate wound    Right buttock, inner groin, gluteal cleft, extending to perineum erythema, induration, severe tenderness recurrent cellulitis   -CT of the ABD and pelvis: Worsening subcutaneous edema in the right median gluteal fold with soft tissue thickening and phlegmonous change in the perianal region and thickening of the levator ani muscle. Gluteal region is excluded from the image and there is regional beam hardening artifact related to patient positioning. No definitive drainable fluid collection within the visualized region of interest or subcutaneous gas.  -+Surrounding cellulitis, no open wounds   -No crepitus, no fluctuance   -No drainage at the time assessment   -Recommend surgery consult to evaluate, noted pustule with puss at base. No drainage able to be express. No crepitus, no palpable abscess on clinical exam consistent with CT scan findings.   -WBC wnl today (yesterday leukocytosis responding to IV antibiotics), patient afebrile   -Patient with hx of MRSA on previous cultures to right perineum   -Pustule to right buttock centrally, not draining.   -Abx per Medicine as per ID  -hemoglobin a1c 5.2%  -Right buttock pustule, gluteal cleft, perineum erythema, induration and tenderess, , keep area clean and dry. Monitor for tissue type changes. Place Interdry textile dressing sheeting to wick moisture, change every day or PRN if soiled. Provide patient with disposable briefs, Change daily or as needed. Monitor for tissue type changes.    Right forearm non draining scab w/o surrounding erythema or induration   -->Right forearm non draining scab: Clean with NS, pat dry. Cover with silicone foam with border. Change every other day or prn if soiled.   -Monitor for tissue type changes      Hypogammaglobulinemia   -Management as per primary team   -Patient with multiple previous wounds, wounds healed but other sites developed   -Receives IVG monthly by immunologist   -Patient f/u with Immunologist outpatient noted last telehealth visit on 5/8/23   -Consider immunologist consult while inpatient       Additional Recs   Left leg healed wound and right second finger hypopigmentation (healed wounds), monitor for tissue type changes, keep clean and dry.   consider MVI & Vit C to promote wound healing  encourage high quality protein when appropriate  Continue turning and positioning w/ offloading assistive devices as per protocol  Continue w/ Pericare as per protocol  Waffle Cushion to chair when oob to chair  Continue w/ low air loss fluidized bed surface     Care as per medicine, will follow w/ you. Please reconsult earlier if needed     **Upon discharge f/u as outpatient at [facility] [address] [place], [place] [phone]  D/w team 4 MD     Thank you for this consult  MADELAINE Bowie-BC, CWREJIN (pager #[phone] or available in MS teams)    If after 4PM or before 7:30AM on Mon-Friday or weekend/holiday please contact general surgery for urgent matters.   Team A- [id number]   Team B- [id number]  For non-urgent matters e-mail [email or web address]    I spent 55 minutes face to face with this patient of which more than 50% of the time was spent counseling & coordinating care of this pt

## 2023-07-31 NOTE — PROGRESS NOTE ADULT - PROBLEM SELECTOR PLAN 5
Hold home diltiazem in setting of sepsis. /60 this AM Hold home diltiazem in setting of sepsis. /65 this AM

## 2023-07-31 NOTE — CONSULT NOTE ADULT - ASSESSMENT
ASSESSMENT/PLAN: 57y M PMHx Schizoaffective D/O (Bipolar Type), HTN, HLD, Hypothyroidism, Daily Smoker, CVID/ Hypogammaglobulinemia (monthly IVIG - last 7/6/2023), T2DM, COPD, recent Lt gluteal MRSA abscess/cellulitis w/ MRSA bacteremia s/p Debridement 6/2023 presenting with worsening right buttock cellulitis. Surgery consulted to r/o abscess. CT 07/29 with incompletely imaged gluteal region of interest. WBC downtending, normal today. Wound with significant erythema and induration concerning for severe cellulitis, no active drainage or fluctuance to suggest abscess.     - Continue IV abx  - Pain control PRN  - If concerning for worsening abscess, recommend ultrasound of area of concern to better characterize and assess for fluid collection     Patient seen with resident, Dr. Carmichael, and discussed with attending, Dr. Soriano.     Sonia Vivas, PGY4  B Team Surgery (Acute Care Surgery)  [phone]

## 2023-08-01 ENCOUNTER — APPOINTMENT (OUTPATIENT)
Dept: WOUND CARE | Facility: CLINIC | Age: 58
End: 2023-08-01

## 2023-08-01 LAB
ALBUMIN SERPL ELPH-MCNC: 3.2 G/DL — LOW (ref 3.3–5)
ALP SERPL-CCNC: 172 U/L — HIGH (ref 40–120)
ALT FLD-CCNC: 28 U/L — SIGNIFICANT CHANGE UP (ref 4–41)
ANION GAP SERPL CALC-SCNC: 16 MMOL/L — HIGH (ref 7–14)
AST SERPL-CCNC: 22 U/L — SIGNIFICANT CHANGE UP (ref 4–40)
BILIRUB SERPL-MCNC: 0.2 MG/DL — SIGNIFICANT CHANGE UP (ref 0.2–1.2)
BUN SERPL-MCNC: 8 MG/DL — SIGNIFICANT CHANGE UP (ref 7–23)
CALCIUM SERPL-MCNC: 9 MG/DL — SIGNIFICANT CHANGE UP (ref 8.4–10.5)
CHLORIDE SERPL-SCNC: 95 MMOL/L — LOW (ref 98–107)
CO2 SERPL-SCNC: 21 MMOL/L — LOW (ref 22–31)
CREAT SERPL-MCNC: 0.89 MG/DL — SIGNIFICANT CHANGE UP (ref 0.5–1.3)
EGFR: 101 ML/MIN/1.73M2 — SIGNIFICANT CHANGE UP
GLUCOSE BLDC GLUCOMTR-MCNC: 116 MG/DL — HIGH (ref 70–99)
GLUCOSE BLDC GLUCOMTR-MCNC: 118 MG/DL — HIGH (ref 70–99)
GLUCOSE BLDC GLUCOMTR-MCNC: 143 MG/DL — HIGH (ref 70–99)
GLUCOSE BLDC GLUCOMTR-MCNC: 179 MG/DL — HIGH (ref 70–99)
GLUCOSE SERPL-MCNC: 95 MG/DL — SIGNIFICANT CHANGE UP (ref 70–99)
HCT VFR BLD CALC: 34.3 % — LOW (ref 39–50)
HGB BLD-MCNC: 11.3 G/DL — LOW (ref 13–17)
MAGNESIUM SERPL-MCNC: 2 MG/DL — SIGNIFICANT CHANGE UP (ref 1.6–2.6)
MCHC RBC-ENTMCNC: 26.8 PG — LOW (ref 27–34)
MCHC RBC-ENTMCNC: 32.9 GM/DL — SIGNIFICANT CHANGE UP (ref 32–36)
MCV RBC AUTO: 81.5 FL — SIGNIFICANT CHANGE UP (ref 80–100)
NRBC # BLD: 0 /100 WBCS — SIGNIFICANT CHANGE UP (ref 0–0)
NRBC # FLD: 0 K/UL — SIGNIFICANT CHANGE UP (ref 0–0)
PHOSPHATE SERPL-MCNC: 4 MG/DL — SIGNIFICANT CHANGE UP (ref 2.5–4.5)
PLATELET # BLD AUTO: 219 K/UL — SIGNIFICANT CHANGE UP (ref 150–400)
POTASSIUM SERPL-MCNC: 4.1 MMOL/L — SIGNIFICANT CHANGE UP (ref 3.5–5.3)
POTASSIUM SERPL-SCNC: 4.1 MMOL/L — SIGNIFICANT CHANGE UP (ref 3.5–5.3)
PROT SERPL-MCNC: 6.4 G/DL — SIGNIFICANT CHANGE UP (ref 6–8.3)
RBC # BLD: 4.21 M/UL — SIGNIFICANT CHANGE UP (ref 4.2–5.8)
RBC # FLD: 15.1 % — HIGH (ref 10.3–14.5)
SODIUM SERPL-SCNC: 132 MMOL/L — LOW (ref 135–145)
WBC # BLD: 9.54 K/UL — SIGNIFICANT CHANGE UP (ref 3.8–10.5)
WBC # FLD AUTO: 9.54 K/UL — SIGNIFICANT CHANGE UP (ref 3.8–10.5)

## 2023-08-01 PROCEDURE — 99232 SBSQ HOSP IP/OBS MODERATE 35: CPT

## 2023-08-01 RX ORDER — ACETAMINOPHEN 500 MG
650 TABLET ORAL ONCE
Refills: 0 | Status: COMPLETED | OUTPATIENT
Start: 2023-08-01 | End: 2023-08-01

## 2023-08-01 RX ADMIN — Medication 650 MILLIGRAM(S): at 16:03

## 2023-08-01 RX ADMIN — BUDESONIDE AND FORMOTEROL FUMARATE DIHYDRATE 2 PUFF(S): 160; 4.5 AEROSOL RESPIRATORY (INHALATION) at 09:05

## 2023-08-01 RX ADMIN — HEPARIN SODIUM 5000 UNIT(S): 5000 INJECTION INTRAVENOUS; SUBCUTANEOUS at 21:28

## 2023-08-01 RX ADMIN — Medication 650 MILLIGRAM(S): at 23:44

## 2023-08-01 RX ADMIN — Medication 250 MILLIGRAM(S): at 09:05

## 2023-08-01 RX ADMIN — Medication 650 MILLIGRAM(S): at 08:30

## 2023-08-01 RX ADMIN — Medication 650 MILLIGRAM(S): at 22:44

## 2023-08-01 RX ADMIN — ATORVASTATIN CALCIUM 40 MILLIGRAM(S): 80 TABLET, FILM COATED ORAL at 21:27

## 2023-08-01 RX ADMIN — HEPARIN SODIUM 5000 UNIT(S): 5000 INJECTION INTRAVENOUS; SUBCUTANEOUS at 05:29

## 2023-08-01 RX ADMIN — HEPARIN SODIUM 5000 UNIT(S): 5000 INJECTION INTRAVENOUS; SUBCUTANEOUS at 12:24

## 2023-08-01 RX ADMIN — TIOTROPIUM BROMIDE 2 PUFF(S): 18 CAPSULE ORAL; RESPIRATORY (INHALATION) at 09:05

## 2023-08-01 RX ADMIN — Medication 1: at 12:23

## 2023-08-01 RX ADMIN — BUDESONIDE AND FORMOTEROL FUMARATE DIHYDRATE 2 PUFF(S): 160; 4.5 AEROSOL RESPIRATORY (INHALATION) at 21:27

## 2023-08-01 RX ADMIN — Medication 50 MICROGRAM(S): at 05:36

## 2023-08-01 RX ADMIN — Medication 650 MILLIGRAM(S): at 17:03

## 2023-08-01 RX ADMIN — Medication 650 MILLIGRAM(S): at 09:30

## 2023-08-01 RX ADMIN — Medication 250 MILLIGRAM(S): at 20:16

## 2023-08-01 NOTE — PROGRESS NOTE ADULT - PROBLEM SELECTOR PLAN 2
Patient with hx of chronic hyponatremia. Na 124, previously due to polydipsia. Also recently on bactrim, now discontinued. S/p 1 L NS in ED. Na 132 on 8/1 and patient reports poor PO intake over the past week. Uosm 342, Farrukh <20.  - Continue to monitor

## 2023-08-01 NOTE — PROGRESS NOTE ADULT - SUBJECTIVE AND OBJECTIVE BOX
SURGERY PROGRESS NOTE    SUBJECTIVE / 24H EVENTS:  Patient seen and examined on morning rounds. No acute events overnight.       OBJECTIVE:  VITAL SIGNS:  T(C): 37.2 (08-01-23 @ 05:28), Max: 37.2 (08-01-23 @ 05:28)  HR: 80 (08-01-23 @ 05:28) (66 - 80)  BP: 139/68 (08-01-23 @ 05:28) (122/76 - 152/70)  RR: 18 (08-01-23 @ 05:28) (18 - 18)  SpO2: 100% (08-01-23 @ 05:28) (99% - 100%)  Daily     Daily   POCT Blood Glucose.: 179 mg/dL (08-01-23 @ 11:57)  POCT Blood Glucose.: 118 mg/dL (08-01-23 @ 08:42)  POCT Blood Glucose.: 119 mg/dL (07-31-23 @ 21:18)      PHYSICAL EXAM:  General: NAD, resting comfortably  Pulmonary: normal resp effort on RA  Abdominal: soft, ND/NT  Gluteal: Area of significant erythema, warmth, and induration extending from left mid buttock to gluteal cleft, unchanged from prior exam. No fluctuance. New green/yellow drainage noted on underwear overlying small pustule/punctum, no fluid able to be expressed      LAB VALUES:  08-01    132<L>  |  95<L>  |  8   ----------------------------<  95  4.1   |  21<L>  |  0.89    Ca    9.0      01 Aug 2023 05:15  Phos  4.0     08-01  Mg     2.00     08-01    TPro  6.4  /  Alb  3.2<L>  /  TBili  0.2  /  DBili  x   /  AST  22  /  ALT  28  /  AlkPhos  172<H>  08-01                               11.3   9.54  )-----------( 219      ( 01 Aug 2023 05:15 )             34.3     LIVER FUNCTIONS - ( 01 Aug 2023 05:15 )  Alb: 3.2 g/dL / Pro: 6.4 g/dL / ALK PHOS: 172 U/L / ALT: 28 U/L / AST: 22 U/L / GGT: x                   Urinalysis Basic - ( 01 Aug 2023 05:15 )    Color: x / Appearance: x / SG: x / pH: x  Gluc: 95 mg/dL / Ketone: x  / Bili: x / Urobili: x   Blood: x / Protein: x / Nitrite: x   Leuk Esterase: x / RBC: x / WBC x   Sq Epi: x / Non Sq Epi: x / Bacteria: x        MICROBIOLOGY:    Culture - Blood (collected 29 Jul 2023 17:15)  Source: .Blood Blood-Peripheral  Preliminary Report (31 Jul 2023 22:02):    No growth at 48 Hours    Culture - Blood (collected 29 Jul 2023 17:00)  Source: .Blood Blood-Peripheral  Preliminary Report (31 Jul 2023 22:02):    No growth at 48 Hours        MEDICATIONS  (STANDING):  atorvastatin 40 milliGRAM(s) Oral at bedtime  budesonide  80 MICROgram(s)/formoterol 4.5 MICROgram(s) Inhaler 2 Puff(s) Inhalation two times a day  dextrose 5%. 1000 milliLiter(s) (50 mL/Hr) IV Continuous <Continuous>  dextrose 50% Injectable 25 Gram(s) IV Push once  glucagon  Injectable 1 milliGRAM(s) IntraMuscular once  heparin   Injectable 5000 Unit(s) SubCutaneous every 8 hours  insulin lispro (ADMELOG) corrective regimen sliding scale   SubCutaneous at bedtime  insulin lispro (ADMELOG) corrective regimen sliding scale   SubCutaneous three times a day before meals  levothyroxine 50 MICROGram(s) Oral daily  sodium chloride 0.9%. 1000 milliLiter(s) (100 mL/Hr) IV Continuous <Continuous>  tiotropium 2.5 MICROgram(s) Inhaler 2 Puff(s) Inhalation daily  vancomycin  IVPB 1750 milliGRAM(s) IV Intermittent every 12 hours    MEDICATIONS  (PRN):  acetaminophen     Tablet .. 650 milliGRAM(s) Oral every 6 hours PRN Temp greater or equal to 38C (100.4F), Mild Pain (1 - 3)  dextrose Oral Gel 15 Gram(s) Oral once PRN Blood Glucose LESS THAN 70 milliGRAM(s)/deciliter

## 2023-08-01 NOTE — PROGRESS NOTE ADULT - SUBJECTIVE AND OBJECTIVE BOX
CC: F/U for Wound infection    Saw/spoke to patient. No fevers, no chills. No new complaints.    Allergies  penicillin (Rash)  amoxicillin (Fever)    ANTIMICROBIALS:  vancomycin  IVPB 1750 every 12 hours    PE:    Vital Signs Last 24 Hrs  T(C): 36.4 (01 Aug 2023 12:38), Max: 37.2 (01 Aug 2023 05:28)  T(F): 97.5 (01 Aug 2023 12:38), Max: 99 (01 Aug 2023 05:28)  HR: 60 (01 Aug 2023 12:38) (60 - 80)  BP: 148/74 (01 Aug 2023 12:38) (139/68 - 152/70)  RR: 16 (01 Aug 2023 12:38) (16 - 18)  SpO2: 97% (01 Aug 2023 12:38) (97% - 100%)    Gen: AOx3, NAD, non-toxic  CV: Nontachycardic  Resp: Breathing comfortably, RA  Abd: Soft, nontender  IV/Skin: No thrombophlebitis    LABS:                        11.3   9.54  )-----------( 219      ( 01 Aug 2023 05:15 )             34.3     08-01    132<L>  |  95<L>  |  8   ----------------------------<  95  4.1   |  21<L>  |  0.89    Ca    9.0      01 Aug 2023 05:15  Phos  4.0     08-01  Mg     2.00     08-01    TPro  6.4  /  Alb  3.2<L>  /  TBili  0.2  /  DBili  x   /  AST  22  /  ALT  28  /  AlkPhos  172<H>  08-01      Urinalysis Basic - ( 01 Aug 2023 05:15 )    Color: x / Appearance: x / SG: x / pH: x  Gluc: 95 mg/dL / Ketone: x  / Bili: x / Urobili: x   Blood: x / Protein: x / Nitrite: x   Leuk Esterase: x / RBC: x / WBC x   Sq Epi: x / Non Sq Epi: x / Bacteria: x    MICROBIOLOGY:  Vancomycin Level, Trough: 11.8 ug/mL (07-31-23 @ 18:37)    .Blood Blood-Peripheral  07-29-23   No growth at 48 Hours  --  --    .Blood Blood-Peripheral  07-29-23   No growth at 48 Hours  --  --    .Abscess Right perineum  07-17-23   Few Methicillin Resistant Staphylococcus aureus  --  Methicillin resistant Staphylococcus aureus    RADIOLOGY:    7/29 CT:    IMPRESSION:  Worsening subcutaneous edema in the right median gluteal fold with soft   tissue thickening and phlegmonous change in the perianal region and   thickening of the levator ani muscle. Gluteal region is excluded from the   image and there is regional beam hardening artifact related to patient   positioning. No definitive drainable fluid collection within the   visualized region of interest or subcutaneous gas.

## 2023-08-01 NOTE — PROGRESS NOTE ADULT - ASSESSMENT
57 M PMHx Schizoaffective D/O (Bipolar Type), HTN, HLD, Hypothyroidism, Daily Smoker, CVID/ Hypogammaglobulinemia (monthly IVIG - last 7/6/2023), T2DM, COPD, recent Lt gluteal MRSA abscess/cellulitis w/ MRSA bacteremia s/p Debridement 6/2023 presenting with worsening right buttock cellulitis  Fever, leukocytosis  Prior MRSA bacteremia  Prior MRSA wound infection  Multiple episodes of recurring abscess/gluteal infection 2/2 MRSA  CT worsening edema R gluteal fold, phlegmonous change perineal, no drainable lesion (7/29)  UA neg  Gluteal SSTI  Overall, Fever, SSTI recurring, hypogammaglobulinemia  - Vanco 1750mg q 12 (continue--monitor levels, trough 10.2 adequate)  - F/U BCXs  - Surgical eval to wound/wound care  - Trend WBC to normal  - Monitor for further fevers  - Monitor for improvement  - Considering multiple recurrence, possible suppression once treatment course completed    Josr Downs MD  Contact on TEAMS messaging from 9am - 5pm  From 5pm-9am, on weekends, or if no response call [phone]

## 2023-08-01 NOTE — PROGRESS NOTE ADULT - SUBJECTIVE AND OBJECTIVE BOX
DATE OF SERVICE: 08-01-23 @ 11:19    Patient is a 57y old  Male who presents with a chief complaint of sepsis (31 Jul 2023 17:35)      SUBJECTIVE / OVERNIGHT EVENTS:  Patient reports worsening buttock pain, especially on the R side  States he has not had a bowel movement since Fri morning  Continues to endorse decreased appetite.  Reports he has lost 60lbs in the last 4 months  Denies nausea/vomiting/fever/chills  Denies SOB/chest pain      MEDICATIONS  (STANDING):  atorvastatin 40 milliGRAM(s) Oral at bedtime  budesonide  80 MICROgram(s)/formoterol 4.5 MICROgram(s) Inhaler 2 Puff(s) Inhalation two times a day  dextrose 5%. 1000 milliLiter(s) (50 mL/Hr) IV Continuous <Continuous>  dextrose 50% Injectable 25 Gram(s) IV Push once  glucagon  Injectable 1 milliGRAM(s) IntraMuscular once  heparin   Injectable 5000 Unit(s) SubCutaneous every 8 hours  insulin lispro (ADMELOG) corrective regimen sliding scale   SubCutaneous three times a day before meals  insulin lispro (ADMELOG) corrective regimen sliding scale   SubCutaneous at bedtime  levothyroxine 50 MICROGram(s) Oral daily  sodium chloride 0.9%. 1000 milliLiter(s) (100 mL/Hr) IV Continuous <Continuous>  tiotropium 2.5 MICROgram(s) Inhaler 2 Puff(s) Inhalation daily  vancomycin  IVPB 1750 milliGRAM(s) IV Intermittent every 12 hours    MEDICATIONS  (PRN):  acetaminophen     Tablet .. 650 milliGRAM(s) Oral every 6 hours PRN Temp greater or equal to 38C (100.4F), Mild Pain (1 - 3)  dextrose Oral Gel 15 Gram(s) Oral once PRN Blood Glucose LESS THAN 70 milliGRAM(s)/deciliter      Vital Signs Last 24 Hrs  T(C): 37.2 (01 Aug 2023 05:28), Max: 37.2 (01 Aug 2023 05:28)  T(F): 99 (01 Aug 2023 05:28), Max: 99 (01 Aug 2023 05:28)  HR: 80 (01 Aug 2023 05:28) (66 - 80)  BP: 139/68 (01 Aug 2023 05:28) (122/76 - 152/70)  BP(mean): --  RR: 18 (01 Aug 2023 05:28) (18 - 18)  SpO2: 100% (01 Aug 2023 05:28) (99% - 100%)    Parameters below as of 01 Aug 2023 05:28  Patient On (Oxygen Delivery Method): room air      CAPILLARY BLOOD GLUCOSE  POCT Blood Glucose.: 118 mg/dL (01 Aug 2023 08:42)  POCT Blood Glucose.: 119 mg/dL (31 Jul 2023 21:18)  POCT Blood Glucose.: 98 mg/dL (31 Jul 2023 17:50)  POCT Blood Glucose.: 172 mg/dL (31 Jul 2023 12:14)      PHYSICAL EXAM:  GENERAL: NAD, well-developed  HEAD:  Atraumatic, Normocephalic  EYES: conjunctiva and sclera clear  NECK: Supple  CHEST/LUNG: Clear to auscultation bilaterally; No wheeze  HEART: Regular rate and rhythm; No murmurs, rubs, or gallops  ABDOMEN: Soft, Nontender, Nondistended  EXTREMITIES: No cyanosis or edema  PSYCH: AAOx3  NEUROLOGY: non-focal  SKIN: New crusting in R lower gluteal region with possible fluctuance in addition to previous findings of induration/erythema extending from R upper gluteal region across gluteal fold.      LABS:                        11.3   9.54  )-----------( 219      ( 01 Aug 2023 05:15 )             34.3     08-01    132<L>  |  95<L>  |  8   ----------------------------<  95  4.1   |  21<L>  |  0.89    Ca    9.0      01 Aug 2023 05:15  Phos  4.0     08-01  Mg     2.00     08-01    TPro  6.4  /  Alb  3.2<L>  /  TBili  0.2  /  DBili  x   /  AST  22  /  ALT  28  /  AlkPhos  172<H>  08-01      Urinalysis Basic - ( 01 Aug 2023 05:15 )  Color: x / Appearance: x / SG: x / pH: x  Gluc: 95 mg/dL / Ketone: x  / Bili: x / Urobili: x   Blood: x / Protein: x / Nitrite: x   Leuk Esterase: x / RBC: x / WBC x   Sq Epi: x / Non Sq Epi: x / Bacteria: x        RADIOLOGY & ADDITIONAL TESTS: no new imaging    Imaging Personally Reviewed: n/a  Consultant(s) Notes Reviewed:  yes  Care Discussed with Consultants/Other Providers: yes   DATE OF SERVICE: 08-01-23 @ 11:19    Patient is a 57y old  Male who presents with a chief complaint of sepsis (31 Jul 2023 17:35)      SUBJECTIVE / OVERNIGHT EVENTS:  Patient reports worsening buttock pain, especially on the R side  States he has not had a bowel movement since Fri morning  Continues to endorse decreased appetite. Reports he has lost 60lbs in the last 4 months  Denies nausea/vomiting/fever/chills. Denies SOB/chest pain  Two sisters expressed concerns over the phone about possible AML diagnosis because their mother with AML had similar skin lesions/infections.      MEDICATIONS  (STANDING):  atorvastatin 40 milliGRAM(s) Oral at bedtime  budesonide  80 MICROgram(s)/formoterol 4.5 MICROgram(s) Inhaler 2 Puff(s) Inhalation two times a day  dextrose 5%. 1000 milliLiter(s) (50 mL/Hr) IV Continuous <Continuous>  dextrose 50% Injectable 25 Gram(s) IV Push once  glucagon  Injectable 1 milliGRAM(s) IntraMuscular once  heparin   Injectable 5000 Unit(s) SubCutaneous every 8 hours  insulin lispro (ADMELOG) corrective regimen sliding scale   SubCutaneous three times a day before meals  insulin lispro (ADMELOG) corrective regimen sliding scale   SubCutaneous at bedtime  levothyroxine 50 MICROGram(s) Oral daily  sodium chloride 0.9%. 1000 milliLiter(s) (100 mL/Hr) IV Continuous <Continuous>  tiotropium 2.5 MICROgram(s) Inhaler 2 Puff(s) Inhalation daily  vancomycin  IVPB 1750 milliGRAM(s) IV Intermittent every 12 hours    MEDICATIONS  (PRN):  acetaminophen     Tablet .. 650 milliGRAM(s) Oral every 6 hours PRN Temp greater or equal to 38C (100.4F), Mild Pain (1 - 3)  dextrose Oral Gel 15 Gram(s) Oral once PRN Blood Glucose LESS THAN 70 milliGRAM(s)/deciliter      Vital Signs Last 24 Hrs  T(C): 37.2 (01 Aug 2023 05:28), Max: 37.2 (01 Aug 2023 05:28)  T(F): 99 (01 Aug 2023 05:28), Max: 99 (01 Aug 2023 05:28)  HR: 80 (01 Aug 2023 05:28) (66 - 80)  BP: 139/68 (01 Aug 2023 05:28) (122/76 - 152/70)  BP(mean): --  RR: 18 (01 Aug 2023 05:28) (18 - 18)  SpO2: 100% (01 Aug 2023 05:28) (99% - 100%)    Parameters below as of 01 Aug 2023 05:28  Patient On (Oxygen Delivery Method): room air      CAPILLARY BLOOD GLUCOSE  POCT Blood Glucose.: 118 mg/dL (01 Aug 2023 08:42)  POCT Blood Glucose.: 119 mg/dL (31 Jul 2023 21:18)  POCT Blood Glucose.: 98 mg/dL (31 Jul 2023 17:50)  POCT Blood Glucose.: 172 mg/dL (31 Jul 2023 12:14)      PHYSICAL EXAM:  GENERAL: NAD, well-developed  HEAD:  Atraumatic, Normocephalic  EYES: conjunctiva and sclera clear  NECK: Supple  CHEST/LUNG: Clear to auscultation bilaterally; No wheeze  HEART: Regular rate and rhythm; No murmurs, rubs, or gallops  ABDOMEN: Soft, Nontender, Nondistended  EXTREMITIES: No cyanosis or edema  PSYCH: AAOx3  NEUROLOGY: non-focal  SKIN: New crusting in R lower gluteal region with possible fluctuance in addition to previous findings of induration/erythema extending from R upper gluteal region across gluteal fold.      LABS:                        11.3   9.54  )-----------( 219      ( 01 Aug 2023 05:15 )             34.3     08-01    132<L>  |  95<L>  |  8   ----------------------------<  95  4.1   |  21<L>  |  0.89    Ca    9.0      01 Aug 2023 05:15  Phos  4.0     08-01  Mg     2.00     08-01    TPro  6.4  /  Alb  3.2<L>  /  TBili  0.2  /  DBili  x   /  AST  22  /  ALT  28  /  AlkPhos  172<H>  08-01      Urinalysis Basic - ( 01 Aug 2023 05:15 )  Color: x / Appearance: x / SG: x / pH: x  Gluc: 95 mg/dL / Ketone: x  / Bili: x / Urobili: x   Blood: x / Protein: x / Nitrite: x   Leuk Esterase: x / RBC: x / WBC x   Sq Epi: x / Non Sq Epi: x / Bacteria: x        RADIOLOGY & ADDITIONAL TESTS: no new imaging    Imaging Personally Reviewed: n/a  Consultant(s) Notes Reviewed:  yes  Care Discussed with Consultants/Other Providers: yes

## 2023-08-01 NOTE — PROGRESS NOTE ADULT - PROBLEM SELECTOR PLAN 3
Cr 1.46 on admission, up from baseline of 0.8. FeNa 0.2% suggested pre-renal etiology, likely secondary to sepsis and poor PO intake. S/p 1L NS, now back to baseline (0.98 7/31, 0.89 on 8/1).  - continue to monitor Cr  - avoid nephrotoxins, renally dose meds

## 2023-08-01 NOTE — PROGRESS NOTE ADULT - PROBLEM SELECTOR PLAN 1
Patient presenting with worsening buttock cellulitis; patient has presented with buttock SSTI 4x in the last 4 months. CT abd/pelvis w/o evidence of any drainable collection or subcutaneous gas. S/p vanc and cefepime in ED, now on vancomycin. ID and wound care following, appreciate recs. Vanc trough 10.2 on 7/31 and 11.8 on repeat, will maintain current dose per ID. BCx neg at 48h. UCx pending.  - continue IV vanc 1750mg q12h (first dose 7/29)  - gen surg consult for eval per wound care recs

## 2023-08-01 NOTE — PROGRESS NOTE ADULT - PROBLEM SELECTOR PLAN 8
DVT ppx: heparin 5000u SQ q8h  Diet: dash/tlc, cc  Code status: full code pending discussion  Dispo: pending clinical improvement

## 2023-08-01 NOTE — PROGRESS NOTE ADULT - ASSESSMENT
ASSESSMENT/PLAN: 57y M PMHx Schizoaffective D/O (Bipolar Type), HTN, HLD, Hypothyroidism, Daily Smoker, CVID/ Hypogammaglobulinemia (monthly IVIG - last 7/6/2023), T2DM, COPD, recent Lt gluteal MRSA abscess/cellulitis w/ MRSA bacteremia s/p Debridement 6/2023 presenting with worsening right buttock cellulitis. Surgery consulted to r/o abscess. CT 07/29 with incompletely imaged gluteal region of interest. WBC downtending, normal today. Wound with significant erythema and induration concerning for severe cellulitis. Wound now with evidence of small drainage but no fluid expressed on exam    - Continue IV abx  - Pain control PRN  - F/u ultrasound   - Will probe wound under local today    B Team Surgery (Acute Care Surgery)  [id number]   ASSESSMENT/PLAN: 57y M PMHx Schizoaffective D/O (Bipolar Type), HTN, HLD, Hypothyroidism, Daily Smoker, CVID/ Hypogammaglobulinemia (monthly IVIG - last 7/6/2023), T2DM, COPD, recent Lt gluteal MRSA abscess/cellulitis w/ MRSA bacteremia s/p Debridement 6/2023 presenting with worsening right buttock cellulitis. Surgery consulted to r/o abscess. CT 07/29 with incompletely imaged gluteal region of interest. WBC downtending, normal today. Wound with significant erythema and induration concerning for severe cellulitis. Wound now with evidence of small drainage but no fluid expressed on exam    - Continue IV abx  - Pain control PRN  - F/u ultrasound     B Team Surgery (Acute Care Surgery)  [id number]

## 2023-08-01 NOTE — PROGRESS NOTE ADULT - ATTENDING COMMENTS
Pt seen and examined 8/1/23    57M with hx of schizoaffective disorder, CVID/hypogammaglobulinemia on monthly IVIG, HTN, DM2, p/w sepsis from Lt gluteal MRSA abscess/cellulitis – failed previous treatments c/b MRSA bacteremia s/p Debridement in May and June of this year, who presents with rt gluteal cellulitis.  Also found to have TASH which has responded to IVF hydration.    He continues to endorse pain in his rt buttock area, he received acetaminophen 8:30 this morning.    Blood cxs 7/29/23 so far are negative.  Vancomycin trough 7/31/23 was 10.2, next trough should be on morning of 8/2/23 prior to the vancomycin dose.    Monitoring rt gluteal region for any increased drainage, dark discoloration, and/or increased fluctuance.  Appreciate surgery, wound care    Appreciate PGY-1, ID!    Kemal Harden M.D.  [facility]  [phone].

## 2023-08-02 LAB
ALBUMIN SERPL ELPH-MCNC: 3.4 G/DL — SIGNIFICANT CHANGE UP (ref 3.3–5)
ALP SERPL-CCNC: 180 U/L — HIGH (ref 40–120)
ALT FLD-CCNC: 31 U/L — SIGNIFICANT CHANGE UP (ref 4–41)
ANION GAP SERPL CALC-SCNC: 13 MMOL/L — SIGNIFICANT CHANGE UP (ref 7–14)
AST SERPL-CCNC: 19 U/L — SIGNIFICANT CHANGE UP (ref 4–40)
BASOPHILS # BLD AUTO: 0.05 K/UL — SIGNIFICANT CHANGE UP (ref 0–0.2)
BASOPHILS NFR BLD AUTO: 0.5 % — SIGNIFICANT CHANGE UP (ref 0–2)
BILIRUB SERPL-MCNC: <0.2 MG/DL — SIGNIFICANT CHANGE UP (ref 0.2–1.2)
BUN SERPL-MCNC: 8 MG/DL — SIGNIFICANT CHANGE UP (ref 7–23)
CALCIUM SERPL-MCNC: 9.2 MG/DL — SIGNIFICANT CHANGE UP (ref 8.4–10.5)
CHLORIDE SERPL-SCNC: 93 MMOL/L — LOW (ref 98–107)
CO2 SERPL-SCNC: 26 MMOL/L — SIGNIFICANT CHANGE UP (ref 22–31)
CREAT SERPL-MCNC: 0.83 MG/DL — SIGNIFICANT CHANGE UP (ref 0.5–1.3)
EGFR: 103 ML/MIN/1.73M2 — SIGNIFICANT CHANGE UP
EOSINOPHIL # BLD AUTO: 0.18 K/UL — SIGNIFICANT CHANGE UP (ref 0–0.5)
EOSINOPHIL NFR BLD AUTO: 1.9 % — SIGNIFICANT CHANGE UP (ref 0–6)
GLUCOSE BLDC GLUCOMTR-MCNC: 102 MG/DL — HIGH (ref 70–99)
GLUCOSE BLDC GLUCOMTR-MCNC: 107 MG/DL — HIGH (ref 70–99)
GLUCOSE BLDC GLUCOMTR-MCNC: 111 MG/DL — HIGH (ref 70–99)
GLUCOSE BLDC GLUCOMTR-MCNC: 141 MG/DL — HIGH (ref 70–99)
GLUCOSE SERPL-MCNC: 96 MG/DL — SIGNIFICANT CHANGE UP (ref 70–99)
HCT VFR BLD CALC: 35.3 % — LOW (ref 39–50)
HGB BLD-MCNC: 11.6 G/DL — LOW (ref 13–17)
IANC: 7.01 K/UL — SIGNIFICANT CHANGE UP (ref 1.8–7.4)
IMM GRANULOCYTES NFR BLD AUTO: 0.4 % — SIGNIFICANT CHANGE UP (ref 0–0.9)
LYMPHOCYTES # BLD AUTO: 0.89 K/UL — LOW (ref 1–3.3)
LYMPHOCYTES # BLD AUTO: 9.6 % — LOW (ref 13–44)
MAGNESIUM SERPL-MCNC: 2.1 MG/DL — SIGNIFICANT CHANGE UP (ref 1.6–2.6)
MCHC RBC-ENTMCNC: 27 PG — SIGNIFICANT CHANGE UP (ref 27–34)
MCHC RBC-ENTMCNC: 32.9 GM/DL — SIGNIFICANT CHANGE UP (ref 32–36)
MCV RBC AUTO: 82.3 FL — SIGNIFICANT CHANGE UP (ref 80–100)
MONOCYTES # BLD AUTO: 1.1 K/UL — HIGH (ref 0–0.9)
MONOCYTES NFR BLD AUTO: 11.9 % — SIGNIFICANT CHANGE UP (ref 2–14)
NEUTROPHILS # BLD AUTO: 7.01 K/UL — SIGNIFICANT CHANGE UP (ref 1.8–7.4)
NEUTROPHILS NFR BLD AUTO: 75.7 % — SIGNIFICANT CHANGE UP (ref 43–77)
NRBC # BLD: 0 /100 WBCS — SIGNIFICANT CHANGE UP (ref 0–0)
NRBC # FLD: 0 K/UL — SIGNIFICANT CHANGE UP (ref 0–0)
PHOSPHATE SERPL-MCNC: 3.9 MG/DL — SIGNIFICANT CHANGE UP (ref 2.5–4.5)
PLATELET # BLD AUTO: 230 K/UL — SIGNIFICANT CHANGE UP (ref 150–400)
POTASSIUM SERPL-MCNC: 4.1 MMOL/L — SIGNIFICANT CHANGE UP (ref 3.5–5.3)
POTASSIUM SERPL-SCNC: 4.1 MMOL/L — SIGNIFICANT CHANGE UP (ref 3.5–5.3)
PROT SERPL-MCNC: 6.4 G/DL — SIGNIFICANT CHANGE UP (ref 6–8.3)
RBC # BLD: 4.29 M/UL — SIGNIFICANT CHANGE UP (ref 4.2–5.8)
RBC # FLD: 14.8 % — HIGH (ref 10.3–14.5)
SODIUM SERPL-SCNC: 132 MMOL/L — LOW (ref 135–145)
VANCOMYCIN TROUGH SERPL-MCNC: 15.3 UG/ML — SIGNIFICANT CHANGE UP (ref 10–20)
WBC # BLD: 9.27 K/UL — SIGNIFICANT CHANGE UP (ref 3.8–10.5)
WBC # FLD AUTO: 9.27 K/UL — SIGNIFICANT CHANGE UP (ref 3.8–10.5)

## 2023-08-02 PROCEDURE — 76882 US LMTD JT/FCL EVL NVASC XTR: CPT | Mod: 26,RT

## 2023-08-02 RX ADMIN — Medication 650 MILLIGRAM(S): at 18:26

## 2023-08-02 RX ADMIN — Medication 650 MILLIGRAM(S): at 19:26

## 2023-08-02 RX ADMIN — ATORVASTATIN CALCIUM 40 MILLIGRAM(S): 80 TABLET, FILM COATED ORAL at 22:31

## 2023-08-02 RX ADMIN — TIOTROPIUM BROMIDE 2 PUFF(S): 18 CAPSULE ORAL; RESPIRATORY (INHALATION) at 11:32

## 2023-08-02 RX ADMIN — HEPARIN SODIUM 5000 UNIT(S): 5000 INJECTION INTRAVENOUS; SUBCUTANEOUS at 13:33

## 2023-08-02 RX ADMIN — Medication 50 MICROGRAM(S): at 05:21

## 2023-08-02 RX ADMIN — Medication 650 MILLIGRAM(S): at 08:20

## 2023-08-02 RX ADMIN — Medication 650 MILLIGRAM(S): at 09:20

## 2023-08-02 RX ADMIN — Medication 250 MILLIGRAM(S): at 17:15

## 2023-08-02 RX ADMIN — HEPARIN SODIUM 5000 UNIT(S): 5000 INJECTION INTRAVENOUS; SUBCUTANEOUS at 05:21

## 2023-08-02 RX ADMIN — Medication 250 MILLIGRAM(S): at 08:20

## 2023-08-02 RX ADMIN — HEPARIN SODIUM 5000 UNIT(S): 5000 INJECTION INTRAVENOUS; SUBCUTANEOUS at 22:37

## 2023-08-02 RX ADMIN — BUDESONIDE AND FORMOTEROL FUMARATE DIHYDRATE 2 PUFF(S): 160; 4.5 AEROSOL RESPIRATORY (INHALATION) at 08:31

## 2023-08-02 RX ADMIN — BUDESONIDE AND FORMOTEROL FUMARATE DIHYDRATE 2 PUFF(S): 160; 4.5 AEROSOL RESPIRATORY (INHALATION) at 21:31

## 2023-08-02 NOTE — CHART NOTE - NSCHARTNOTEFT_GEN_A_CORE
Notified by RN regarding BM with moderate bright red blood. Stool disposed, not visualized however patient corroborates findings. Patient noted brown serous fluid running down leg and improvement in gluteal pain which he attributes to a "burst abscess". Erythematous indurated right gluteal cleft on examination with minimal serosanguinous drainage. Limited exam due to pain, unable to perform UZMA. Patient denies history of GIB, not on NSAIDS, no history of hemorrhoids, constipation, BRBPR. Denies abdominal pain.   Likely bloody BM 2/2 abscess vs hemorrhoidal etiology. RN notified to f/u next bowel movement. Notified by RN regarding BM with moderate bright red blood. Stool disposed therefore not visualized, however patient corroborates findings. At 18:30 patient noted brown serous fluid running down leg and improvement in gluteal pain which he attributes to a "burst abscess". Two hours later patient passed single BM which was streaked with blood.   Erythematous indurated right gluteal cleft on examination with minimal serosanguinous drainage. Limited exam due to pain, unable to perform UZMA. Patient denies history of GIB, not on NSAIDS, no history of hemorrhoids, constipation, BRBPR. Denies abdominal pain.   Likely bloody BM 2/2 abscess vs hemorrhoidal etiology. RN notified to f/u next bowel movement. Notified by RN regarding BM with moderate bright red blood. Stool disposed therefore not visualized, however patient corroborates findings. At 18:30 patient felt wetness, noted brown tinged serous fluid running down leg and improvement in gluteal pain which he attributes to a "burst abscess". Two hours later patient passed single BM which was streaked with blood.   Erythematous indurated right gluteal cleft on examination with small amount of serosanguinous drainage with pressure. Limited exam due to pain, unable to perform UZMA. Patient denies history of GIB, prolonged NSAID use, no history of hemorrhoids, constipation, BRBPR. Denies abdominal pain.   Likely bloody BM 2/2 draining abscess vs hemorrhoids. RN notified to f/u next bowel movement. Notified by RN regarding BM with moderate bright red blood. Stool disposed therefore not visualized, however patient corroborates findings. At 18:30 patient felt wetness, noted brown tinged serous fluid running down leg and improvement in gluteal pain which he attributes to a "burst abscess". Two hours later patient passed single BM which was streaked with blood.   Erythematous indurated right gluteal cleft on examination with small amount of serosanguinous drainage with pressure. Limited exam due to pain, unable to perform UZMA. Patient denies history of GIB, prolonged NSAID use, no history of hemorrhoids, constipation, BRBPR. Denies abdominal pain.   Likely bloody BM 2/2 draining abscess vs hemorrhoids. RN notified to f/u with provider regarding appearance of subsequent bowel movements. Notified by RN regarding BM with moderate bright red blood. Stool disposed therefore not visualized, however patient corroborates findings. At 18:30 patient felt wetness, noted brown tinged serous fluid running down leg and improvement in gluteal pain which he attributes to a "burst abscess". Two hours later patient passed single BM which was streaked with blood.   VSS. Erythematous indurated right gluteal cleft on examination with small amount of serosanguinous drainage with pressure. Limited exam due to pain, unable to perform UZMA. Patient denies history of GIB, prolonged NSAID use, no history of hemorrhoids, constipation, BRBPR. Denies abdominal pain, melena, fatigue, weakness, palpitations.   Likely bloody BM 2/2 draining abscess vs hemorrhoids. RN notified to f/u with provider regarding appearance of subsequent bowel movements.

## 2023-08-02 NOTE — PROGRESS NOTE ADULT - SUBJECTIVE AND OBJECTIVE BOX
DATE OF SERVICE: 08-02-23 @ 07:17    Patient is a 57y old  Male who presents with a chief complaint of sepsis (01 Aug 2023 11:58)      SUBJECTIVE / OVERNIGHT EVENTS:    MEDICATIONS  (STANDING):  atorvastatin 40 milliGRAM(s) Oral at bedtime  budesonide  80 MICROgram(s)/formoterol 4.5 MICROgram(s) Inhaler 2 Puff(s) Inhalation two times a day  dextrose 5%. 1000 milliLiter(s) (50 mL/Hr) IV Continuous <Continuous>  dextrose 50% Injectable 25 Gram(s) IV Push once  glucagon  Injectable 1 milliGRAM(s) IntraMuscular once  heparin   Injectable 5000 Unit(s) SubCutaneous every 8 hours  insulin lispro (ADMELOG) corrective regimen sliding scale   SubCutaneous three times a day before meals  insulin lispro (ADMELOG) corrective regimen sliding scale   SubCutaneous at bedtime  levothyroxine 50 MICROGram(s) Oral daily  sodium chloride 0.9%. 1000 milliLiter(s) (100 mL/Hr) IV Continuous <Continuous>  tiotropium 2.5 MICROgram(s) Inhaler 2 Puff(s) Inhalation daily  vancomycin  IVPB 1750 milliGRAM(s) IV Intermittent every 12 hours    MEDICATIONS  (PRN):  acetaminophen     Tablet .. 650 milliGRAM(s) Oral every 6 hours PRN Temp greater or equal to 38C (100.4F), Mild Pain (1 - 3)  dextrose Oral Gel 15 Gram(s) Oral once PRN Blood Glucose LESS THAN 70 milliGRAM(s)/deciliter      Vital Signs Last 24 Hrs  T(C): 36.7 (02 Aug 2023 05:30), Max: 36.7 (01 Aug 2023 21:11)  T(F): 98.1 (02 Aug 2023 05:30), Max: 98.1 (01 Aug 2023 21:11)  HR: 70 (02 Aug 2023 05:30) (60 - 70)  BP: 138/60 (02 Aug 2023 05:30) (138/60 - 148/74)  BP(mean): --  RR: 17 (02 Aug 2023 05:30) (16 - 17)  SpO2: 97% (02 Aug 2023 05:30) (97% - 99%)    Parameters below as of 02 Aug 2023 05:30  Patient On (Oxygen Delivery Method): room air      CAPILLARY BLOOD GLUCOSE      POCT Blood Glucose.: 143 mg/dL (01 Aug 2023 21:26)  POCT Blood Glucose.: 116 mg/dL (01 Aug 2023 18:12)  POCT Blood Glucose.: 179 mg/dL (01 Aug 2023 11:57)  POCT Blood Glucose.: 118 mg/dL (01 Aug 2023 08:42)    I&O's Summary      PHYSICAL EXAM:  GENERAL: NAD, well-developed  HEAD:  Atraumatic, Normocephalic  EYES: EOMI, PERRLA, conjunctiva and sclera clear  NECK: Supple, No JVD  CHEST/LUNG: Clear to auscultation bilaterally; No wheeze  HEART: Regular rate and rhythm; No murmurs, rubs, or gallops  ABDOMEN: Soft, Nontender, Nondistended; Bowel sounds present  EXTREMITIES:  2+ Peripheral Pulses, No clubbing, cyanosis, or edema  PSYCH: AAOx3  NEUROLOGY: non-focal  SKIN: No rashes or lesions    LABS:                        11.3   9.54  )-----------( 219      ( 01 Aug 2023 05:15 )             34.3     08-01    132<L>  |  95<L>  |  8   ----------------------------<  95  4.1   |  21<L>  |  0.89    Ca    9.0      01 Aug 2023 05:15  Phos  4.0     08-01  Mg     2.00     08-01    TPro  6.4  /  Alb  3.2<L>  /  TBili  0.2  /  DBili  x   /  AST  22  /  ALT  28  /  AlkPhos  172<H>  08-01          Urinalysis Basic - ( 01 Aug 2023 05:15 )    Color: x / Appearance: x / SG: x / pH: x  Gluc: 95 mg/dL / Ketone: x  / Bili: x / Urobili: x   Blood: x / Protein: x / Nitrite: x   Leuk Esterase: x / RBC: x / WBC x   Sq Epi: x / Non Sq Epi: x / Bacteria: x        RADIOLOGY & ADDITIONAL TESTS:    Imaging Personally Reviewed:    Consultant(s) Notes Reviewed:      Care Discussed with Consultants/Other Providers:   DATE OF SERVICE: 08-02-23 @ 07:17    Patient is a 57y old  Male who presents with a chief complaint of sepsis (01 Aug 2023 11:58)      SUBJECTIVE / OVERNIGHT EVENTS:  Patient continues to endorse buttock pain, reports it is unchanged in intensity from yesterday and focused on the R side.  Sister Brittany was updated yesterday evening.    MEDICATIONS  (STANDING):  atorvastatin 40 milliGRAM(s) Oral at bedtime  budesonide  80 MICROgram(s)/formoterol 4.5 MICROgram(s) Inhaler 2 Puff(s) Inhalation two times a day  dextrose 5%. 1000 milliLiter(s) (50 mL/Hr) IV Continuous <Continuous>  dextrose 50% Injectable 25 Gram(s) IV Push once  glucagon  Injectable 1 milliGRAM(s) IntraMuscular once  heparin   Injectable 5000 Unit(s) SubCutaneous every 8 hours  insulin lispro (ADMELOG) corrective regimen sliding scale   SubCutaneous three times a day before meals  insulin lispro (ADMELOG) corrective regimen sliding scale   SubCutaneous at bedtime  levothyroxine 50 MICROGram(s) Oral daily  sodium chloride 0.9%. 1000 milliLiter(s) (100 mL/Hr) IV Continuous <Continuous>  tiotropium 2.5 MICROgram(s) Inhaler 2 Puff(s) Inhalation daily  vancomycin  IVPB 1750 milliGRAM(s) IV Intermittent every 12 hours    MEDICATIONS  (PRN):  acetaminophen     Tablet .. 650 milliGRAM(s) Oral every 6 hours PRN Temp greater or equal to 38C (100.4F), Mild Pain (1 - 3)  dextrose Oral Gel 15 Gram(s) Oral once PRN Blood Glucose LESS THAN 70 milliGRAM(s)/deciliter      Vital Signs Last 24 Hrs  T(C): 36.7 (02 Aug 2023 05:30), Max: 36.7 (01 Aug 2023 21:11)  T(F): 98.1 (02 Aug 2023 05:30), Max: 98.1 (01 Aug 2023 21:11)  HR: 70 (02 Aug 2023 05:30) (60 - 70)  BP: 138/60 (02 Aug 2023 05:30) (138/60 - 148/74)  BP(mean): --  RR: 17 (02 Aug 2023 05:30) (16 - 17)  SpO2: 97% (02 Aug 2023 05:30) (97% - 99%)    Parameters below as of 02 Aug 2023 05:30  Patient On (Oxygen Delivery Method): room air      CAPILLARY BLOOD GLUCOSE  POCT Blood Glucose.: 143 mg/dL (01 Aug 2023 21:26)  POCT Blood Glucose.: 116 mg/dL (01 Aug 2023 18:12)  POCT Blood Glucose.: 179 mg/dL (01 Aug 2023 11:57)  POCT Blood Glucose.: 118 mg/dL (01 Aug 2023 08:42)      PHYSICAL EXAM:  GENERAL: NAD, well-developed  HEAD:  Atraumatic, Normocephalic  EYES: conjunctiva and sclera clear  NECK: Supple  CHEST/LUNG: Clear to auscultation bilaterally; No wheeze  HEART: Regular rate and rhythm; No murmurs, rubs, or gallops  ABDOMEN: Soft, Nontender, Nondistended  EXTREMITIES:  No cyanosis or edema  PSYCH: AAOx3  NEUROLOGY: non-focal  SKIN: erythematous induration on R gluteal region extending into gluteal cleft. Skin peeling without notable discharge in R lower gluteal region.        LABS:                        11.6   9.27  )-----------( 230      ( 02 Aug 2023 05:50 )             35.3     08-02    132<L>  |  93<L>  |  8   ----------------------------<  96  4.1   |  26  |  0.83    Ca    9.2      02 Aug 2023 05:50  Phos  3.9     08-02  Mg     2.10     08-02    TPro  6.4  /  Alb  3.4  /  TBili  <0.2  /  DBili  x   /  AST  19  /  ALT  31  /  AlkPhos  180<H>  08-02      Urinalysis Basic - ( 02 Aug 2023 05:50 )  Color: x / Appearance: x / SG: x / pH: x  Gluc: 96 mg/dL / Ketone: x  / Bili: x / Urobili: x   Blood: x / Protein: x / Nitrite: x   Leuk Esterase: x / RBC: x / WBC x   Sq Epi: x / Non Sq Epi: x / Bacteria: x        RADIOLOGY & ADDITIONAL TESTS:  US Extremity Nonvasc Limited, Right (08.02.23 @ 10:32)  In the right buttock, there is heterogeneous collection measuring 4.3 x   3.8 x 2.8 cm with peripheral color Doppler flow and adjacent subcutaneous   edema. Findings are suspicious for a abscess and phlegmon.    Imaging Personally Reviewed: yes  Consultant(s) Notes Reviewed:  yes  Care Discussed with Consultants/Other Providers: yes

## 2023-08-02 NOTE — PROGRESS NOTE ADULT - ATTENDING COMMENTS
Chart reviewed. Vitals and Labs noted.   Pt seen and examined at bedside. Plan formulated with the resident. Detail H&P as above.     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  Also found to have TASH which has responded to IVF hydration.    Pain is overall improving. Tylenol now helping.   Blood cxs 7/29/23 so far are negative.  Vancomycin trough 7/31/23 was 10.2, next trough should be on morning of 8/2/23 prior to the vancomycin dose.    Monitoring rt gluteal region for any increased drainage, dark discoloration, and/or increased fluctuance.  Appreciate surgery, wound care  US shows right buttock heterogeneous collection measuring 4.3 x 3.8 x 2.8 cm suspicious for a abscess and phlegmon.  SURGERY follow up re: possibility of I&D?     - Dr. JAKE Morales ([facility])  - [phone]

## 2023-08-02 NOTE — PROGRESS NOTE ADULT - PROBLEM SELECTOR PLAN 1
Patient presenting with worsening buttock cellulitis; patient has presented with buttock SSTI 4x in the last 4 months. CT abd/pelvis w/o evidence of any drainable collection or subcutaneous gas. S/p vanc and cefepime in ED, now on vancomycin. ID and wound care following, appreciate recs. Vanc trough 10.2 on 7/31 and 11.8 on repeat, will maintain current dose per ID. BCx neg at 48h. UCx pending.  - continue IV vanc 1750mg q12h (first dose 7/29)  - gen surg consult for eval per wound care recs Patient presenting with worsening buttock cellulitis; patient has presented with buttock SSTI 4x in the last 4 months. CT abd/pelvis w/o evidence of any drainable collection or subcutaneous gas. S/p vanc and cefepime in ED, now on vancomycin. ID and wound care following, appreciate recs. Vanc trough 10.2 on 7/31 and 11.8 on repeat, will maintain current dose per ID. BCx neg at 72h. Ultrasound concerning for phlegmon and abscess.   - continue IV vanc 1750mg q12h (first dose 7/29)  - gen surg following, appreciate recs

## 2023-08-02 NOTE — PROGRESS NOTE ADULT - PROBLEM SELECTOR PLAN 2
Patient with hx of chronic hyponatremia. Na 124, previously due to polydipsia. Also recently on bactrim, now discontinued. S/p 1 L NS in ED. Na 132 on 8/1 and patient reports poor PO intake over the past week. Uosm 342, Farrukh <20.  - Continue to monitor Patient with hx of chronic hyponatremia. Na 124 previously due to polydipsia. Also recently on bactrim, now discontinued. S/p 1 L NS in ED. Na 132 on 8/1 and patient reports poor PO intake over the past week. Uosm 342, Farrukh <20.  - Continue to monitor

## 2023-08-02 NOTE — PROGRESS NOTE ADULT - PROBLEM SELECTOR PLAN 5
Hold home diltiazem in setting of sepsis. /68 this AM Hold home diltiazem in setting of sepsis. /60 this AM

## 2023-08-03 ENCOUNTER — APPOINTMENT (OUTPATIENT)
Dept: RHEUMATOLOGY | Facility: CLINIC | Age: 58
End: 2023-08-03

## 2023-08-03 LAB
ALBUMIN SERPL ELPH-MCNC: 3.4 G/DL — SIGNIFICANT CHANGE UP (ref 3.3–5)
ALP SERPL-CCNC: 177 U/L — HIGH (ref 40–120)
ALT FLD-CCNC: 36 U/L — SIGNIFICANT CHANGE UP (ref 4–41)
ANION GAP SERPL CALC-SCNC: 13 MMOL/L — SIGNIFICANT CHANGE UP (ref 7–14)
AST SERPL-CCNC: 28 U/L — SIGNIFICANT CHANGE UP (ref 4–40)
BILIRUB SERPL-MCNC: <0.2 MG/DL — SIGNIFICANT CHANGE UP (ref 0.2–1.2)
BUN SERPL-MCNC: 9 MG/DL — SIGNIFICANT CHANGE UP (ref 7–23)
CALCIUM SERPL-MCNC: 9.6 MG/DL — SIGNIFICANT CHANGE UP (ref 8.4–10.5)
CHLORIDE SERPL-SCNC: 95 MMOL/L — LOW (ref 98–107)
CO2 SERPL-SCNC: 25 MMOL/L — SIGNIFICANT CHANGE UP (ref 22–31)
CREAT SERPL-MCNC: 0.85 MG/DL — SIGNIFICANT CHANGE UP (ref 0.5–1.3)
CULTURE RESULTS: SIGNIFICANT CHANGE UP
CULTURE RESULTS: SIGNIFICANT CHANGE UP
EGFR: 103 ML/MIN/1.73M2 — SIGNIFICANT CHANGE UP
GLUCOSE BLDC GLUCOMTR-MCNC: 108 MG/DL — HIGH (ref 70–99)
GLUCOSE BLDC GLUCOMTR-MCNC: 129 MG/DL — HIGH (ref 70–99)
GLUCOSE BLDC GLUCOMTR-MCNC: 130 MG/DL — HIGH (ref 70–99)
GLUCOSE BLDC GLUCOMTR-MCNC: 132 MG/DL — HIGH (ref 70–99)
GLUCOSE SERPL-MCNC: 119 MG/DL — HIGH (ref 70–99)
HCT VFR BLD CALC: 37.3 % — LOW (ref 39–50)
HGB BLD-MCNC: 12.1 G/DL — LOW (ref 13–17)
MAGNESIUM SERPL-MCNC: 2 MG/DL — SIGNIFICANT CHANGE UP (ref 1.6–2.6)
MCHC RBC-ENTMCNC: 26.8 PG — LOW (ref 27–34)
MCHC RBC-ENTMCNC: 32.4 GM/DL — SIGNIFICANT CHANGE UP (ref 32–36)
MCV RBC AUTO: 82.7 FL — SIGNIFICANT CHANGE UP (ref 80–100)
NRBC # BLD: 0 /100 WBCS — SIGNIFICANT CHANGE UP (ref 0–0)
NRBC # FLD: 0 K/UL — SIGNIFICANT CHANGE UP (ref 0–0)
PHOSPHATE SERPL-MCNC: 4.8 MG/DL — HIGH (ref 2.5–4.5)
PLATELET # BLD AUTO: 270 K/UL — SIGNIFICANT CHANGE UP (ref 150–400)
POTASSIUM SERPL-MCNC: 4.8 MMOL/L — SIGNIFICANT CHANGE UP (ref 3.5–5.3)
POTASSIUM SERPL-SCNC: 4.8 MMOL/L — SIGNIFICANT CHANGE UP (ref 3.5–5.3)
PROT SERPL-MCNC: 6.7 G/DL — SIGNIFICANT CHANGE UP (ref 6–8.3)
RBC # BLD: 4.51 M/UL — SIGNIFICANT CHANGE UP (ref 4.2–5.8)
RBC # FLD: 14.7 % — HIGH (ref 10.3–14.5)
SODIUM SERPL-SCNC: 133 MMOL/L — LOW (ref 135–145)
[facility] SOURCE: SIGNIFICANT CHANGE UP
[facility] SOURCE: SIGNIFICANT CHANGE UP
VANCOMYCIN TROUGH SERPL-MCNC: 12.6 UG/ML — SIGNIFICANT CHANGE UP (ref 10–20)
WBC # BLD: 8.47 K/UL — SIGNIFICANT CHANGE UP (ref 3.8–10.5)
WBC # FLD AUTO: 8.47 K/UL — SIGNIFICANT CHANGE UP (ref 3.8–10.5)

## 2023-08-03 PROCEDURE — 99232 SBSQ HOSP IP/OBS MODERATE 35: CPT

## 2023-08-03 RX ORDER — LIDOCAINE 4 G/100G
1 CREAM TOPICAL ONCE
Refills: 0 | Status: DISCONTINUED | OUTPATIENT
Start: 2023-08-03 | End: 2023-08-10

## 2023-08-03 RX ORDER — IMMUNE GLOBULIN (HUMAN) 10 G/100ML
70 INJECTION INTRAVENOUS; SUBCUTANEOUS ONCE
Refills: 0 | Status: DISCONTINUED | OUTPATIENT
Start: 2023-08-03 | End: 2023-08-04

## 2023-08-03 RX ADMIN — Medication 650 MILLIGRAM(S): at 17:59

## 2023-08-03 RX ADMIN — Medication 650 MILLIGRAM(S): at 18:59

## 2023-08-03 RX ADMIN — Medication 650 MILLIGRAM(S): at 12:44

## 2023-08-03 RX ADMIN — Medication 50 MICROGRAM(S): at 05:49

## 2023-08-03 RX ADMIN — Medication 650 MILLIGRAM(S): at 11:44

## 2023-08-03 RX ADMIN — BUDESONIDE AND FORMOTEROL FUMARATE DIHYDRATE 2 PUFF(S): 160; 4.5 AEROSOL RESPIRATORY (INHALATION) at 09:28

## 2023-08-03 RX ADMIN — HEPARIN SODIUM 5000 UNIT(S): 5000 INJECTION INTRAVENOUS; SUBCUTANEOUS at 14:28

## 2023-08-03 RX ADMIN — HEPARIN SODIUM 5000 UNIT(S): 5000 INJECTION INTRAVENOUS; SUBCUTANEOUS at 21:48

## 2023-08-03 RX ADMIN — ATORVASTATIN CALCIUM 40 MILLIGRAM(S): 80 TABLET, FILM COATED ORAL at 21:47

## 2023-08-03 RX ADMIN — Medication 250 MILLIGRAM(S): at 19:05

## 2023-08-03 RX ADMIN — Medication 650 MILLIGRAM(S): at 01:10

## 2023-08-03 RX ADMIN — HEPARIN SODIUM 5000 UNIT(S): 5000 INJECTION INTRAVENOUS; SUBCUTANEOUS at 05:49

## 2023-08-03 RX ADMIN — TIOTROPIUM BROMIDE 2 PUFF(S): 18 CAPSULE ORAL; RESPIRATORY (INHALATION) at 09:28

## 2023-08-03 RX ADMIN — BUDESONIDE AND FORMOTEROL FUMARATE DIHYDRATE 2 PUFF(S): 160; 4.5 AEROSOL RESPIRATORY (INHALATION) at 21:47

## 2023-08-03 RX ADMIN — Medication 250 MILLIGRAM(S): at 05:48

## 2023-08-03 RX ADMIN — Medication 650 MILLIGRAM(S): at 02:00

## 2023-08-03 NOTE — PROGRESS NOTE ADULT - PROBLEM SELECTOR PLAN 2
Patient with hx of chronic hyponatremia. Na 124 previously due to polydipsia. Also recently on bactrim, now discontinued. S/p 1 L NS in ED. Na 132 on 8/1 and patient reports poor PO intake over the past week. Uosm 342, Farrukh <20.  - Continue to monitor

## 2023-08-03 NOTE — PROGRESS NOTE ADULT - ASSESSMENT
Assessment/Plan:    Wound care surgery  follow up for right buttock erythema and induration with tenderness and isolated pustule in right inner buttock. Patient evaluated by general surgery team and recommend US of area of interest in gluteal cleft. Patient   last seen by surgery on 8/1/23 and noted small draining from pustule. As per surgery will follow.   On exam, patient remains with significant induration, erythema, severe pain on palpation. Centrally in right inner buttock pustule unroof and appears with moderate sanguinous drainage, no drainage able to be express. Increased warmth. Right perineum with less palpable induration and less erythema. Scrotum not involved.  Unable to assess depth secondary to pain, patient would need local analgesic to explore further. Will defer to surgery.  Soiled briefs with sanguinous drainage noted, removed and clean briefs provided. Patient also had a soft BM and bloody stool observed vs. sanguinous drainage from narrow ulceration. No odor.   Given now ulcer spontaneously draining recommend Clean with NS. Pat dry. Apply liquid barrier film to surrounding skin, place Aquacel AG overt it, and cover with ABD pad. Secure with brief.   -US findings with suspicion of abscess- some sanguinous fluid draining spontaneously however still significant erythema and induration. + Pain. No crepitus, no fluctuance.   -General surgery following       Right arm non draining scab, now with 100% reepithelialization  Bilateral buttocks, Left leg wound and right second finger wound remain healed.        Continue low airloss support surface.  Continue to turn and position every 2 hours with z-roz fluidized positioning device.  Continue use of Complete Cair pressure offloading boots.  Continue Nutritional management as per RD recommendations.    Upon discharge follow up at outpatient [facility]. [address] [place] [address]. [phone].    Will continue to follow while inpatient. Please reconsult earlier if needed   Thank you.    Disussed findings and plan with primary team MD Lee Thomas, AGNP-BC, CWOC    pager #[phone] or available in MS teams     If after 4PM or before 7:30AM on Mon-Friday or weekend/holiday please contact general surgery for urgent matters.   Team A- [id number]   Team B- [id number]  For non-urgent matters e-mail [email or web address]    I spent 35 minutes face-to-face with this patient of which more than 50% of the time was spent counseling/coordinating care of this patient.       Assessment/Plan: Mr. Cristina is a 57y M with PMH Schizoaffective D/O (Bipolar Type), HTN, HLD, Hypothyroidism, Daily Smoker, CVID/ Hypogammaglobulinemia (monthly IVIG - last 7/6/2023), T2DM, COPD, recent Lt gluteal MRSA abscess/cellulitis w/ MRSA bacteremia s/p Debridement 6/2023 presenting with worsening right buttock cellulitis. Pt found to be septic likely 2/2 buttock cellulitis. Pt admitted for further management.     Wound care surgery  follow up for right buttock erythema and induration with tenderness and isolated pustule in right inner buttock. Patient evaluated by general surgery team and recommend US of area of interest in gluteal cleft. Patient last seen by surgery on 8/1/23 and noted small draining from pustule. As per surgery will follow.   On exam, patient remains with significant induration, erythema, severe pain on palpation. Centrally in right inner buttock pustule unroof and appears with small  sanguinous drainage, no purulence drainage able to be express. No odor. Increased warmth. Right perineum with less palpable induration and less erythema. Scrotum not involved.  Unable to assess depth secondary to pain, patient would need local analgesic to explore further. Will defer to surgery.  Soiled briefs with sanguinous drainage noted, removed and clean briefs provided. Patient also had a soft BM and bloody stool? observed vs. sanguinous drainage from narrow ulceration. No odor.   Given now ulcer spontaneously draining recommend absorptive dressing to wick drainage:  Clean with NS. Pat dry. Apply liquid barrier film to surrounding skin, place Aquacel AG overt it, and cover with ABD pad. Secure with brief. Change at least daily or if soiled when patient has BMs.  -US findings with suspicion of abscess- some sanguinous fluid draining spontaneously however still significant erythema and induration. + Pain. No crepitus, no fluctuance. No ongoing bleeding.   -General surgery following   -Low suspicious of fistula, however, if true melena, consider r/o fistula   -Encourage offloading and placing seat cushion if patient out of bed to the chair        Right arm non draining scab, now with 100% reepithelialization  Bilateral buttocks, Left leg wound and right second finger wound remain healed.        Continue low airloss support surface.  Continue to turn and position every 2 hours with z-roz fluidized positioning device.  Continue use of Complete Cair pressure offloading boots.  Continue Nutritional management as per RD recommendations.    Upon discharge follow up at outpatient [facility]. [address] [place] [address]. [phone].    Will continue to follow while inpatient. Please reconsult earlier if needed   Thank you.    Disussed findings and plan with primary team MD Lee Thomas, AGNP-BC, CWOC    pager #[phone] or available in MS teams     If after 4PM or before 7:30AM on Mon-Friday or weekend/holiday please contact general surgery for urgent matters.   Team A- [id number]   Team B- [id number]  For non-urgent matters e-mail [email or web address]    I spent 35 minutes face-to-face with this patient of which more than 50% of the time was spent counseling/coordinating care of this patient.

## 2023-08-03 NOTE — PROGRESS NOTE ADULT - SUBJECTIVE AND OBJECTIVE BOX
CC: F/U for Wound    Saw/spoke to patient. No fevers, no chills. No new complaints.    Allergies  penicillin (Rash)  amoxicillin (Fever)    ANTIMICROBIALS:  vancomycin  IVPB 1750 every 12 hours    PE:    Vital Signs Last 24 Hrs  T(C): 36.6 (03 Aug 2023 12:55), Max: 36.8 (02 Aug 2023 21:51)  T(F): 97.9 (03 Aug 2023 12:55), Max: 98.2 (02 Aug 2023 21:51)  HR: 60 (03 Aug 2023 12:55) (60 - 69)  BP: 147/82 (03 Aug 2023 12:55) (147/82 - 156/86)  RR: 18 (03 Aug 2023 12:55) (17 - 18)  SpO2: 100% (03 Aug 2023 12:55) (98% - 100%)    Gen: AOx3, NAD, non-toxic  CV: Nontachycardic  Resp: Breathing comfortably, RA  Abd: Soft, nontender  IV/Skin: No thrombophlebitis    LABS:                        12.1   8.47  )-----------( 270      ( 03 Aug 2023 07:07 )             37.3     08-03    133<L>  |  95<L>  |  9   ----------------------------<  119<H>  4.8   |  25  |  0.85    Ca    9.6      03 Aug 2023 07:07  Phos  4.8     08-03  Mg     2.00     08-03    TPro  6.7  /  Alb  3.4  /  TBili  <0.2  /  DBili  x   /  AST  28  /  ALT  36  /  AlkPhos  177<H>  08-03    Urinalysis Basic - ( 03 Aug 2023 07:07 )    Color: x / Appearance: x / SG: x / pH: x  Gluc: 119 mg/dL / Ketone: x  / Bili: x / Urobili: x   Blood: x / Protein: x / Nitrite: x   Leuk Esterase: x / RBC: x / WBC x   Sq Epi: x / Non Sq Epi: x / Bacteria: x    MICROBIOLOGY:    .Blood Blood-Peripheral  07-29-23   No growth at 4 days  --  --    .Blood Blood-Peripheral  07-29-23   No growth at 4 days  --  --    .Abscess Right perineum  07-17-23   Few Methicillin Resistant Staphylococcus aureus  --  Methicillin resistant Staphylococcus aureus    .Blood Blood-Peripheral  07-15-23   No growth at 5 days  --  --    .Blood Blood-Peripheral  07-15-23   No growth at 5 days  --  --    Clean Catch Clean Catch (Midstream)  07-14-23   <10,000 CFU/mL Normal Urogenital Sydni  --  --    (otherwise reviewed)    RADIOLOGY:    8/2 US:    FINDINGS/  IMPRESSION:    In the right buttock, there is heterogeneous collection measuring 4.3 x   3.8 x 2.8 cm with peripheral color Doppler flow and adjacent subcutaneous   edema. Findings are suspicious for a abscess and phlegmon.

## 2023-08-03 NOTE — PROGRESS NOTE ADULT - ATTENDING COMMENTS
Chart reviewed. Vitals and Labs noted.   Pt seen and examined at bedside. Plan formulated with the resident. Detail H&P as above.     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  Also found to have TASH which has responded to IVF hydration.    Pain is overall improving. Tylenol now helping.   Blood cxs 7/29/23 so far are negative.  Vanco per ID.   Monitoring rt gluteal region for any increased drainage, dark discoloration, and/or increased fluctuance.  US shows right buttock heterogeneous collection measuring 4.3 x 3.8 x 2.8 cm suspicious for a abscess and phlegmon.  Appreciate surgery, wound care, now with spontaneous drainage/ popped per the pt.    SURGERY/wound care follow up.     Hx of CVID on monthly Gammagard.  OP Immunologist: Dr. Mitchell Boxer ([facility])  Last Gammagard 7/6/23. Due today, will give the infusion.  c/w qmonthly infusions after discharge.    - Dr. JAKE Morales ([facility])  - [phone]

## 2023-08-03 NOTE — PROGRESS NOTE ADULT - ASSESSMENT
57 M PMHx Schizoaffective D/O (Bipolar Type), HTN, HLD, Hypothyroidism, Daily Smoker, CVID/ Hypogammaglobulinemia (monthly IVIG - last 7/6/2023), T2DM, COPD, recent Lt gluteal MRSA abscess/cellulitis w/ MRSA bacteremia s/p Debridement 6/2023 presenting with worsening right buttock cellulitis  Fever, leukocytosis  Prior MRSA bacteremia  Prior MRSA wound infection  Multiple episodes of recurring abscess/gluteal infection 2/2 MRSA  CT worsening edema R gluteal fold, phlegmonous change perineal, no drainable lesion (7/29)  UA neg  Gluteal SSTI  Overall, Fever, SSTI recurring, hypogammaglobulinemia  - Vanco 1750mg q 12 (continue--monitor levels, trough 10.2 adequate)  - F/U BCXs  - Surgical eval to wound/wound care  - Trend WBC to normal  - Monitor for further fevers  - Monitor for improvement  - Considering multiple recurrence, possible suppression once treatment course completed    Josr Downs MD  Contact on TEAMS messaging from 9am - 5pm  From 5pm-9am, on weekends, or if no response call [phone]

## 2023-08-03 NOTE — PROGRESS NOTE ADULT - PROBLEM SELECTOR PLAN 8
DVT ppx: heparin 5000u SQ q8h  Diet: dash/tlc, cc  Code status: full code pending discussion  Dispo: pending clinical improvement DVT ppx: heparin 5000u SQ q8h  Diet: dash/tlc, cc  Code status: full code  Dispo: pending clinical improvement

## 2023-08-03 NOTE — PROGRESS NOTE ADULT - SUBJECTIVE AND OBJECTIVE BOX
DATE OF SERVICE: 08-03-23 @ 07:04    Patient is a 57y old  Male who presents with a chief complaint of sepsis (02 Aug 2023 07:14)      SUBJECTIVE / OVERNIGHT EVENTS:    MEDICATIONS  (STANDING):  atorvastatin 40 milliGRAM(s) Oral at bedtime  budesonide  80 MICROgram(s)/formoterol 4.5 MICROgram(s) Inhaler 2 Puff(s) Inhalation two times a day  dextrose 5%. 1000 milliLiter(s) (50 mL/Hr) IV Continuous <Continuous>  dextrose 50% Injectable 25 Gram(s) IV Push once  glucagon  Injectable 1 milliGRAM(s) IntraMuscular once  heparin   Injectable 5000 Unit(s) SubCutaneous every 8 hours  insulin lispro (ADMELOG) corrective regimen sliding scale   SubCutaneous three times a day before meals  insulin lispro (ADMELOG) corrective regimen sliding scale   SubCutaneous at bedtime  levothyroxine 50 MICROGram(s) Oral daily  sodium chloride 0.9%. 1000 milliLiter(s) (100 mL/Hr) IV Continuous <Continuous>  tiotropium 2.5 MICROgram(s) Inhaler 2 Puff(s) Inhalation daily  vancomycin  IVPB 1750 milliGRAM(s) IV Intermittent every 12 hours    MEDICATIONS  (PRN):  acetaminophen     Tablet .. 650 milliGRAM(s) Oral every 6 hours PRN Temp greater or equal to 38C (100.4F), Mild Pain (1 - 3)  dextrose Oral Gel 15 Gram(s) Oral once PRN Blood Glucose LESS THAN 70 milliGRAM(s)/deciliter  lidocaine   4% Patch 1 Patch Transdermal once PRN moderate pain      Vital Signs Last 24 Hrs  T(C): 36.7 (03 Aug 2023 05:40), Max: 36.8 (02 Aug 2023 21:51)  T(F): 98.1 (03 Aug 2023 05:40), Max: 98.2 (02 Aug 2023 21:51)  HR: 61 (03 Aug 2023 05:40) (60 - 69)  BP: 155/76 (03 Aug 2023 05:40) (129/72 - 156/86)  BP(mean): --  RR: 17 (03 Aug 2023 05:40) (17 - 18)  SpO2: 98% (03 Aug 2023 05:40) (98% - 100%)    Parameters below as of 03 Aug 2023 05:40  Patient On (Oxygen Delivery Method): room air      CAPILLARY BLOOD GLUCOSE      POCT Blood Glucose.: 141 mg/dL (02 Aug 2023 21:15)  POCT Blood Glucose.: 107 mg/dL (02 Aug 2023 17:39)  POCT Blood Glucose.: 102 mg/dL (02 Aug 2023 12:08)  POCT Blood Glucose.: 111 mg/dL (02 Aug 2023 08:26)    I&O's Summary    02 Aug 2023 07:01  -  03 Aug 2023 07:00  --------------------------------------------------------  IN: 480 mL / OUT: 600 mL / NET: -120 mL        PHYSICAL EXAM:  GENERAL: NAD, well-developed  HEAD:  Atraumatic, Normocephalic  EYES: EOMI, PERRLA, conjunctiva and sclera clear  NECK: Supple, No JVD  CHEST/LUNG: Clear to auscultation bilaterally; No wheeze  HEART: Regular rate and rhythm; No murmurs, rubs, or gallops  ABDOMEN: Soft, Nontender, Nondistended; Bowel sounds present  EXTREMITIES:  2+ Peripheral Pulses, No clubbing, cyanosis, or edema  PSYCH: AAOx3  NEUROLOGY: non-focal  SKIN: No rashes or lesions    LABS:                   RADIOLOGY & ADDITIONAL TESTS:    Imaging Personally Reviewed:    Consultant(s) Notes Reviewed:      Care Discussed with Consultants/Other Providers:   DATE OF SERVICE: 08-03-23 @ 07:04    Patient is a 57y old  Male who presents with a chief complaint of sepsis (02 Aug 2023 07:14)      SUBJECTIVE / OVERNIGHT EVENTS:  Patient reports noticing brown-tinged discharge from gluteal wound overnight running down his leg, followed by decrease in pain/improved sx  NF examined patient and found discharge expressed on pressure to wound.  2h later reported bowel movement with bright red blood which was flushed before staff could examine, vital signs remained stable.   Patient had another bowel movement with blood later this morning, blood appeared similar to serosanguinous discharge from wound.    MEDICATIONS  (STANDING):  atorvastatin 40 milliGRAM(s) Oral at bedtime  budesonide  80 MICROgram(s)/formoterol 4.5 MICROgram(s) Inhaler 2 Puff(s) Inhalation two times a day  dextrose 5%. 1000 milliLiter(s) (50 mL/Hr) IV Continuous <Continuous>  dextrose 50% Injectable 25 Gram(s) IV Push once  glucagon  Injectable 1 milliGRAM(s) IntraMuscular once  heparin   Injectable 5000 Unit(s) SubCutaneous every 8 hours  insulin lispro (ADMELOG) corrective regimen sliding scale   SubCutaneous three times a day before meals  insulin lispro (ADMELOG) corrective regimen sliding scale   SubCutaneous at bedtime  levothyroxine 50 MICROGram(s) Oral daily  sodium chloride 0.9%. 1000 milliLiter(s) (100 mL/Hr) IV Continuous <Continuous>  tiotropium 2.5 MICROgram(s) Inhaler 2 Puff(s) Inhalation daily  vancomycin  IVPB 1750 milliGRAM(s) IV Intermittent every 12 hours    MEDICATIONS  (PRN):  acetaminophen     Tablet .. 650 milliGRAM(s) Oral every 6 hours PRN Temp greater or equal to 38C (100.4F), Mild Pain (1 - 3)  dextrose Oral Gel 15 Gram(s) Oral once PRN Blood Glucose LESS THAN 70 milliGRAM(s)/deciliter  lidocaine   4% Patch 1 Patch Transdermal once PRN moderate pain      Vital Signs Last 24 Hrs  T(C): 36.7 (03 Aug 2023 05:40), Max: 36.8 (02 Aug 2023 21:51)  T(F): 98.1 (03 Aug 2023 05:40), Max: 98.2 (02 Aug 2023 21:51)  HR: 61 (03 Aug 2023 05:40) (60 - 69)  BP: 155/76 (03 Aug 2023 05:40) (129/72 - 156/86)  BP(mean): --  RR: 17 (03 Aug 2023 05:40) (17 - 18)  SpO2: 98% (03 Aug 2023 05:40) (98% - 100%)    Parameters below as of 03 Aug 2023 05:40  Patient On (Oxygen Delivery Method): room air      CAPILLARY BLOOD GLUCOSE  POCT Blood Glucose.: 141 mg/dL (02 Aug 2023 21:15)  POCT Blood Glucose.: 107 mg/dL (02 Aug 2023 17:39)  POCT Blood Glucose.: 102 mg/dL (02 Aug 2023 12:08)  POCT Blood Glucose.: 111 mg/dL (02 Aug 2023 08:26)    I&O's Summary  02 Aug 2023 07:01  -  03 Aug 2023 07:00  --------------------------------------------------------  IN: 480 mL / OUT: 600 mL / NET: -120 mL        PHYSICAL EXAM:  GENERAL: NAD, well-developed  HEAD:  Atraumatic, Normocephalic  EYES: conjunctiva and sclera clear  NECK: Supple  CHEST/LUNG: Clear to auscultation bilaterally; No wheeze  HEART: Regular rate and rhythm; No murmurs, rubs, or gallops  ABDOMEN: Soft, Nontender, Nondistended; Bowel sounds present  EXTREMITIES:  No cyanosis or edema  PSYCH: AAOx3  NEUROLOGY: non-focal  SKIN: R gluteal erythema and edema appears somewhat reduced. Copious serosanguinous discharge visible around wound and staining clothing.       LABS:                        12.1   8.47  )-----------( 270      ( 03 Aug 2023 07:07 )             37.3     08-03    133<L>  |  95<L>  |  9   ----------------------------<  119<H>  4.8   |  25  |  0.85    Ca    9.6      03 Aug 2023 07:07  Phos  4.8     08-03  Mg     2.00     08-03    TPro  6.7  /  Alb  3.4  /  TBili  <0.2  /  DBili  x   /  AST  28  /  ALT  36  /  AlkPhos  177<H>  08-03      Urinalysis Basic - ( 03 Aug 2023 07:07 )  Color: x / Appearance: x / SG: x / pH: x  Gluc: 119 mg/dL / Ketone: x  / Bili: x / Urobili: x   Blood: x / Protein: x / Nitrite: x   Leuk Esterase: x / RBC: x / WBC x   Sq Epi: x / Non Sq Epi: x / Bacteria: x                     RADIOLOGY & ADDITIONAL TESTS: no new imaging    Imaging Personally Reviewed: n/a    Consultant(s) Notes Reviewed:  yes    Care Discussed with Consultants/Other Providers: yes

## 2023-08-03 NOTE — PROGRESS NOTE ADULT - PROBLEM SELECTOR PLAN 1
Patient presenting with worsening buttock cellulitis; patient has presented with buttock SSTI 4x in the last 4 months. CT abd/pelvis w/o evidence of any drainable collection or subcutaneous gas. S/p vanc and cefepime in ED, now on vancomycin. ID and wound care following, appreciate recs. Vanc trough 10.2 on 7/31 and 11.8 on repeat, will maintain current dose per ID. BCx neg at 72h. Ultrasound concerning for phlegmon and abscess.   - continue IV vanc 1750mg q12h (first dose 7/29)  - gen surg following, appreciate recs Patient presenting with worsening buttock cellulitis; patient has presented with buttock SSTI 4x in the last 4 months. CT abd/pelvis w/o evidence of any drainable collection or subcutaneous gas. S/p vanc and cefepime in ED, now on vancomycin. ID and wound care following, appreciate recs. Vanc trough 10.2 on 7/31 and 11.8 on repeat, will maintain current dose per ID. BCx neg at 72h. Ultrasound concerning for phlegmon and abscess.  - continue IV vanc 1750mg q12h (first dose 7/29)  - gen surg following, appreciate recs: plan for bedside drainage today  - wound care following, appreciate recs

## 2023-08-03 NOTE — PROGRESS NOTE ADULT - SUBJECTIVE AND OBJECTIVE BOX
[facility]-- WOUND TEAM -- FOLLOW UP NOTE  --------------------------------------------------------------------------------    subjective: Patient seen and examined at bedside. Patient reports " pimple popped yesterday and drain about 1 oz of fluid and then more after he had a BM last night" endorses he feels much better since.     Interval HPI/24 hour events:   Chart reviewed including labs and relevant images      Diet:  Diet, DASH/TLC:   Sodium & Cholesterol Restricted  Consistent Carbohydrate Evening Snack (CSTCHOSN)  1500mL Fluid Restriction ([id number]) (07-29-23 @ 22:57)      ROS: see above     ALLERGIES & MEDICATIONS  --------------------------------------------------------------------------------  Allergies    penicillin (Rash)  amoxicillin (Fever)    Intolerances      STANDING INPATIENT MEDICATIONS    atorvastatin 40 milliGRAM(s) Oral at bedtime  budesonide  80 MICROgram(s)/formoterol 4.5 MICROgram(s) Inhaler 2 Puff(s) Inhalation two times a day  dextrose 5%. 1000 milliLiter(s) IV Continuous <Continuous>  dextrose 50% Injectable 25 Gram(s) IV Push once  glucagon  Injectable 1 milliGRAM(s) IntraMuscular once  heparin   Injectable 5000 Unit(s) SubCutaneous every 8 hours  insulin lispro (ADMELOG) corrective regimen sliding scale   SubCutaneous three times a day before meals  insulin lispro (ADMELOG) corrective regimen sliding scale   SubCutaneous at bedtime  levothyroxine 50 MICROGram(s) Oral daily  sodium chloride 0.9%. 1000 milliLiter(s) IV Continuous <Continuous>  tiotropium 2.5 MICROgram(s) Inhaler 2 Puff(s) Inhalation daily  vancomycin  IVPB 1750 milliGRAM(s) IV Intermittent every 12 hours      PRN INPATIENT MEDICATION  acetaminophen     Tablet .. 650 milliGRAM(s) Oral every 6 hours PRN  dextrose Oral Gel 15 Gram(s) Oral once PRN  lidocaine   4% Patch 1 Patch Transdermal once PRN        Vital signs:  T(C): 36.7 (08-03-23 @ 05:40), Max: 36.8 (08-02-23 @ 21:51)  HR: 61 (08-03-23 @ 05:40) (60 - 69)  BP: 155/76 (08-03-23 @ 05:40) (129/72 - 156/86)  RR: 17 (08-03-23 @ 05:40) (17 - 18)  SpO2: 98% (08-03-23 @ 05:40) (98% - 100%)    Wt(kg): --        08-02-23 @ 07:01  -  08-03-23 @ 07:00  --------------------------------------------------------  IN: 480 mL / OUT: 600 mL / NET: -120 mL            LABS/ CULTURES/ RADIOLOGY:              12.1   8.47  >-----------<  270      [08-03-23 @ 07:07]              37.3     133  |  95  |  9   ----------------------------<  119      [08-03-23 @ 07:07]  4.8   |  25  |  0.85        Ca     9.6     [08-03-23 @ 07:07]      Mg     2.00     [08-03-23 @ 07:07]      Phos  4.8     [08-03-23 @ 07:07]    TPro  6.7  /  Alb  3.4  /  TBili  <0.2  /  DBili  x   /  AST  28  /  ALT  36  /  AlkPhos  177  [08-03-23 @ 07:07]              CAPILLARY BLOOD GLUCOSE      POCT Blood Glucose.: 132 mg/dL (03 Aug 2023 08:23)  POCT Blood Glucose.: 141 mg/dL (02 Aug 2023 21:15)  POCT Blood Glucose.: 107 mg/dL (02 Aug 2023 17:39)  POCT Blood Glucose.: 102 mg/dL (02 Aug 2023 12:08)      Culture - Blood (collected 07-29-23 @ 17:15)  Source: .Blood Blood-Peripheral  Preliminary Report (08-02-23 @ 22:01):    No growth at 4 days    Culture - Blood (collected 07-29-23 @ 17:00)  Source: .Blood Blood-Peripheral  Preliminary Report (08-02-23 @ 22:01):    No growth at 4 days      A1C with Estimated Average Glucose Result: 5.2 % (07-14-23 @ 23:42)  A1C with Estimated Average Glucose Result: 6.3 % (05-08-23 @ 05:55)  A1C with Estimated Average Glucose Result: 6.3 % (02-05-23 @ 10:43)        ACC: [id number] EXAM:  US NONVASC EXT LTD RT   ORDERED BY: RUDDY DARLING     PROCEDURE DATE:  08/02/2023          INTERPRETATION:  ULTRASOUND LOWER EXTREMITY    HISTORY: Right gluteal cellulitis, evaluate for abscess    COMPARISON: CT abdomen pelvis 7/29/2023, 7/15/2023    TECHNIQUE: Grayscale and color Doppler evaluation of the soft tissues of   the right buttock was performed with the focus on the area of interest.    FINDINGS/  IMPRESSION:    In the right buttock, there is heterogeneous collection measuring 4.3 x   3.8 x 2.8 cm with peripheral color Doppler flow and adjacent subcutaneous   edema. Findings are suspicious for a abscess and phlegmon.        --- End of Report ---          KAITLIN MURGUIA DO; Resident Radiologist  This document has been electronically signed.  AGAPITO STAPLES MD; Attending Radiologist  This document has been electronically signed. Aug  2 2023 10:55AM           [facility]-- WOUND TEAM -- FOLLOW UP NOTE  --------------------------------------------------------------------------------    subjective: Patient seen and examined at bedside. Patient reports " pimple popped yesterday and drain about 1 oz of fluid and then more after he had a BM last night" endorses he feels much better since.     Interval HPI/24 hour events:   Afebrile   WBC wnl  Appreciate general surgical consult for right buttock cellulitis   creatine levels wnl  IV antibiotics as per ID   Chart reviewed including labs and relevant images      Diet:  Diet, DASH/TLC:   Sodium & Cholesterol Restricted  Consistent Carbohydrate Evening Snack (CSTCHOSN)  1500mL Fluid Restriction ([id number]) (07-29-23 @ 22:57)      ROS: see above     ALLERGIES & MEDICATIONS  --------------------------------------------------------------------------------  Allergies    penicillin (Rash)  amoxicillin (Fever)    Intolerances      STANDING INPATIENT MEDICATIONS    atorvastatin 40 milliGRAM(s) Oral at bedtime  budesonide  80 MICROgram(s)/formoterol 4.5 MICROgram(s) Inhaler 2 Puff(s) Inhalation two times a day  dextrose 5%. 1000 milliLiter(s) IV Continuous <Continuous>  dextrose 50% Injectable 25 Gram(s) IV Push once  glucagon  Injectable 1 milliGRAM(s) IntraMuscular once  heparin   Injectable 5000 Unit(s) SubCutaneous every 8 hours  insulin lispro (ADMELOG) corrective regimen sliding scale   SubCutaneous three times a day before meals  insulin lispro (ADMELOG) corrective regimen sliding scale   SubCutaneous at bedtime  levothyroxine 50 MICROGram(s) Oral daily  sodium chloride 0.9%. 1000 milliLiter(s) IV Continuous <Continuous>  tiotropium 2.5 MICROgram(s) Inhaler 2 Puff(s) Inhalation daily  vancomycin  IVPB 1750 milliGRAM(s) IV Intermittent every 12 hours      PRN INPATIENT MEDICATION  acetaminophen     Tablet .. 650 milliGRAM(s) Oral every 6 hours PRN  dextrose Oral Gel 15 Gram(s) Oral once PRN  lidocaine   4% Patch 1 Patch Transdermal once PRN        Vital signs:  T(C): 36.7 (08-03-23 @ 05:40), Max: 36.8 (08-02-23 @ 21:51)  HR: 61 (08-03-23 @ 05:40) (60 - 69)  BP: 155/76 (08-03-23 @ 05:40) (129/72 - 156/86)  RR: 17 (08-03-23 @ 05:40) (17 - 18)  SpO2: 98% (08-03-23 @ 05:40) (98% - 100%)    Wt(kg): --        08-02-23 @ 07:01  -  08-03-23 @ 07:00  --------------------------------------------------------  IN: 480 mL / OUT: 600 mL / NET: -120 mL    Constitutional: NAD/AOX4/Up and margot in room walking out of the bathroom , soiled briefs with sanguinous stain changed and clean disposable briefs provided.  .   Disheveled, obese   (+) low airloss support surface  HEENT:  NC/AT, non icteric, mucosa moist, throat clear, trachea midline, neck supple  Cardiovascular: Rate regular   Respiratory: Equal chest expansion, room air.   Gastrointestinal: soft NT/ND. BM with bloody strikes and scattered sanguinous spots in toilet bowl, primary team notified. Difficult to determine wether bloody drainage is from sanguinous drainage or blood stool.   : wnl, scrotum soft, no erythema, no induration.   Neurology: sensation grossly intact, follows commands   Musculoskeletal: Up and Margot, full ROM of all fingers and extremites  Vascular: BLE equally warm, no edema   Skin:  moist w/ good turgor  Left great toe with intact yellow callus, intact   Left lateral lower leg soft tissue defect, healed wound.   Right second finger previous trauma wound now healed exposing 100% hypopigmentation. No surrounding erythema   Right anterior forearm with prev stable non draining scab now with 100% hypopigmentation, no surrounding erythema, no induration.   Patient standing for initial skin inspection.   Back: no wounds to thoracic spine   Left groin/perineum w/o induration or tenderness.   Right buttock with asymmetry, + edema (erythema, induration in inner right buttock to gluteal cleft), less induration and erythema noted to right perineum than previous assessment.  Asked patient to lay down for inspection of perineum, right buttock/ inner buttock/gluteal cleft with induration, + erythema mostly towards right inner buttock, Increased warmth. severe tenderness Centrally in right buttock noted a pustule measuring 0.5cm0.5cm, scant sanguinous drainage at base and draining spontaneously during skin assessment,  unable to assess depth given patient severe pain, screaming with palpation. No active bleeding. Unable to express purulent drainage. Periwound skin as noted above. No culture obtained given patient not tolerating. No crepitus, no fluctuance.   Patient requested PO Tylenol after skin assessment. Primary RN notified   Psych: calm and cooperative.           LABS/ CULTURES/ RADIOLOGY:              12.1   8.47  >-----------<  270      [08-03-23 @ 07:07]              37.3     133  |  95  |  9   ----------------------------<  119      [08-03-23 @ 07:07]  4.8   |  25  |  0.85        Ca     9.6     [08-03-23 @ 07:07]      Mg     2.00     [08-03-23 @ 07:07]      Phos  4.8     [08-03-23 @ 07:07]    TPro  6.7  /  Alb  3.4  /  TBili  <0.2  /  DBili  x   /  AST  28  /  ALT  36  /  AlkPhos  177  [08-03-23 @ 07:07]              CAPILLARY BLOOD GLUCOSE      POCT Blood Glucose.: 132 mg/dL (03 Aug 2023 08:23)  POCT Blood Glucose.: 141 mg/dL (02 Aug 2023 21:15)  POCT Blood Glucose.: 107 mg/dL (02 Aug 2023 17:39)  POCT Blood Glucose.: 102 mg/dL (02 Aug 2023 12:08)      Culture - Blood (collected 07-29-23 @ 17:15)  Source: .Blood Blood-Peripheral  Preliminary Report (08-02-23 @ 22:01):    No growth at 4 days    Culture - Blood (collected 07-29-23 @ 17:00)  Source: .Blood Blood-Peripheral  Preliminary Report (08-02-23 @ 22:01):    No growth at 4 days      A1C with Estimated Average Glucose Result: 5.2 % (07-14-23 @ 23:42)  A1C with Estimated Average Glucose Result: 6.3 % (05-08-23 @ 05:55)  A1C with Estimated Average Glucose Result: 6.3 % (02-05-23 @ 10:43)        ACC: [id number] EXAM:  US NONVASC EXT LTD RT   ORDERED BY: RUDDY DARLING     PROCEDURE DATE:  08/02/2023          INTERPRETATION:  ULTRASOUND LOWER EXTREMITY    HISTORY: Right gluteal cellulitis, evaluate for abscess    COMPARISON: CT abdomen pelvis 7/29/2023, 7/15/2023    TECHNIQUE: Grayscale and color Doppler evaluation of the soft tissues of   the right buttock was performed with the focus on the area of interest.    FINDINGS/  IMPRESSION:    In the right buttock, there is heterogeneous collection measuring 4.3 x   3.8 x 2.8 cm with peripheral color Doppler flow and adjacent subcutaneous   edema. Findings are suspicious for a abscess and phlegmon.        --- End of Report ---          KAITLIN MRUGUIA DO; Resident Radiologist  This document has been electronically signed.  AGAPITO STAPLES MD; Attending Radiologist  This document has been electronically signed. Aug  2 2023 10:55AM

## 2023-08-04 ENCOUNTER — TRANSCRIPTION ENCOUNTER (OUTPATIENT)
Age: 58
End: 2023-08-04

## 2023-08-04 DIAGNOSIS — D83.9 COMMON VARIABLE IMMUNODEFICIENCY, UNSPECIFIED: ICD-10-CM

## 2023-08-04 LAB
ALBUMIN SERPL ELPH-MCNC: 3.4 G/DL — SIGNIFICANT CHANGE UP (ref 3.3–5)
ALP SERPL-CCNC: 156 U/L — HIGH (ref 40–120)
ALT FLD-CCNC: 37 U/L — SIGNIFICANT CHANGE UP (ref 4–41)
ANION GAP SERPL CALC-SCNC: 12 MMOL/L — SIGNIFICANT CHANGE UP (ref 7–14)
AST SERPL-CCNC: 18 U/L — SIGNIFICANT CHANGE UP (ref 4–40)
BILIRUB SERPL-MCNC: <0.2 MG/DL — SIGNIFICANT CHANGE UP (ref 0.2–1.2)
BUN SERPL-MCNC: 12 MG/DL — SIGNIFICANT CHANGE UP (ref 7–23)
CALCIUM SERPL-MCNC: 9.5 MG/DL — SIGNIFICANT CHANGE UP (ref 8.4–10.5)
CHLORIDE SERPL-SCNC: 96 MMOL/L — LOW (ref 98–107)
CO2 SERPL-SCNC: 24 MMOL/L — SIGNIFICANT CHANGE UP (ref 22–31)
CREAT SERPL-MCNC: 0.85 MG/DL — SIGNIFICANT CHANGE UP (ref 0.5–1.3)
EGFR: 103 ML/MIN/1.73M2 — SIGNIFICANT CHANGE UP
GLUCOSE BLDC GLUCOMTR-MCNC: 114 MG/DL — HIGH (ref 70–99)
GLUCOSE BLDC GLUCOMTR-MCNC: 140 MG/DL — HIGH (ref 70–99)
GLUCOSE BLDC GLUCOMTR-MCNC: 93 MG/DL — SIGNIFICANT CHANGE UP (ref 70–99)
GLUCOSE BLDC GLUCOMTR-MCNC: 95 MG/DL — SIGNIFICANT CHANGE UP (ref 70–99)
GLUCOSE SERPL-MCNC: 121 MG/DL — HIGH (ref 70–99)
HCT VFR BLD CALC: 36.4 % — LOW (ref 39–50)
HGB BLD-MCNC: 11.8 G/DL — LOW (ref 13–17)
MAGNESIUM SERPL-MCNC: 2 MG/DL — SIGNIFICANT CHANGE UP (ref 1.6–2.6)
MCHC RBC-ENTMCNC: 26.3 PG — LOW (ref 27–34)
MCHC RBC-ENTMCNC: 32.4 GM/DL — SIGNIFICANT CHANGE UP (ref 32–36)
MCV RBC AUTO: 81.1 FL — SIGNIFICANT CHANGE UP (ref 80–100)
NRBC # BLD: 0 /100 WBCS — SIGNIFICANT CHANGE UP (ref 0–0)
NRBC # FLD: 0 K/UL — SIGNIFICANT CHANGE UP (ref 0–0)
PHOSPHATE SERPL-MCNC: 4.7 MG/DL — HIGH (ref 2.5–4.5)
PLATELET # BLD AUTO: 285 K/UL — SIGNIFICANT CHANGE UP (ref 150–400)
POTASSIUM SERPL-MCNC: 4.1 MMOL/L — SIGNIFICANT CHANGE UP (ref 3.5–5.3)
POTASSIUM SERPL-SCNC: 4.1 MMOL/L — SIGNIFICANT CHANGE UP (ref 3.5–5.3)
PROT SERPL-MCNC: 6.5 G/DL — SIGNIFICANT CHANGE UP (ref 6–8.3)
RBC # BLD: 4.49 M/UL — SIGNIFICANT CHANGE UP (ref 4.2–5.8)
RBC # FLD: 14.9 % — HIGH (ref 10.3–14.5)
SODIUM SERPL-SCNC: 132 MMOL/L — LOW (ref 135–145)
WBC # BLD: 6.01 K/UL — SIGNIFICANT CHANGE UP (ref 3.8–10.5)
WBC # FLD AUTO: 6.01 K/UL — SIGNIFICANT CHANGE UP (ref 3.8–10.5)

## 2023-08-04 PROCEDURE — 99232 SBSQ HOSP IP/OBS MODERATE 35: CPT

## 2023-08-04 RX ORDER — IMMUNE GLOBULIN (HUMAN) 10 G/100ML
70 INJECTION INTRAVENOUS; SUBCUTANEOUS ONCE
Refills: 0 | Status: DISCONTINUED | OUTPATIENT
Start: 2023-08-04 | End: 2023-08-07

## 2023-08-04 RX ADMIN — Medication 650 MILLIGRAM(S): at 00:50

## 2023-08-04 RX ADMIN — HEPARIN SODIUM 5000 UNIT(S): 5000 INJECTION INTRAVENOUS; SUBCUTANEOUS at 05:49

## 2023-08-04 RX ADMIN — BUDESONIDE AND FORMOTEROL FUMARATE DIHYDRATE 2 PUFF(S): 160; 4.5 AEROSOL RESPIRATORY (INHALATION) at 22:55

## 2023-08-04 RX ADMIN — Medication 650 MILLIGRAM(S): at 01:45

## 2023-08-04 RX ADMIN — TIOTROPIUM BROMIDE 2 PUFF(S): 18 CAPSULE ORAL; RESPIRATORY (INHALATION) at 12:27

## 2023-08-04 RX ADMIN — Medication 650 MILLIGRAM(S): at 14:38

## 2023-08-04 RX ADMIN — Medication 650 MILLIGRAM(S): at 15:38

## 2023-08-04 RX ADMIN — BUDESONIDE AND FORMOTEROL FUMARATE DIHYDRATE 2 PUFF(S): 160; 4.5 AEROSOL RESPIRATORY (INHALATION) at 12:27

## 2023-08-04 RX ADMIN — HEPARIN SODIUM 5000 UNIT(S): 5000 INJECTION INTRAVENOUS; SUBCUTANEOUS at 22:46

## 2023-08-04 RX ADMIN — Medication 250 MILLIGRAM(S): at 05:49

## 2023-08-04 RX ADMIN — Medication 250 MILLIGRAM(S): at 18:27

## 2023-08-04 RX ADMIN — ATORVASTATIN CALCIUM 40 MILLIGRAM(S): 80 TABLET, FILM COATED ORAL at 22:45

## 2023-08-04 RX ADMIN — HEPARIN SODIUM 5000 UNIT(S): 5000 INJECTION INTRAVENOUS; SUBCUTANEOUS at 14:39

## 2023-08-04 RX ADMIN — Medication 50 MICROGRAM(S): at 05:48

## 2023-08-04 NOTE — PROGRESS NOTE ADULT - PROBLEM SELECTOR PLAN 8
DVT ppx: heparin 5000u SQ q8h  Diet: dash/tlc, cc  Code status: full code  Dispo: pending clinical improvement Stable, continue trelegy therapeutic interchange

## 2023-08-04 NOTE — PROGRESS NOTE ADULT - PROBLEM SELECTOR PLAN 2
Patient with hx of chronic hyponatremia. Na 124 previously due to polydipsia. Also recently on bactrim, now discontinued. S/p 1 L NS in ED. Na 132 on 8/1 and patient reports poor PO intake over the past week. Uosm 342, Farrukh <20.  - Continue to monitor Patient was scheduled for outpatient IVIG infusion on 8/3  - 1x gammagard 70mg IV ordered to receive while inpatient

## 2023-08-04 NOTE — PROGRESS NOTE ADULT - ATTENDING COMMENTS
Chart reviewed. Vitals and Labs noted.   Pt seen and examined at bedside. Plan formulated with the resident. Detail H&P as above.     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  Also found to have TASH which has responded to IVF hydration.    Pain is overall improving. c/w PNR Tylenol.  Blood cxs 7/29/23 so far are negative.  Vanco per ID.   Monitoring rt gluteal region for any increased drainage, dark discoloration, and/or increased fluctuance.  US shows right buttock heterogeneous collection measuring 4.3 x 3.8 x 2.8 cm suspicious for a abscess and phlegmon.  Appreciate surgery, wound care, now with spontaneous drainage/ popped per the pt.    SURGERY/wound care follow up.     Hx of CVID on monthly Gammagard.  OP Immunologist: Dr. Mitchell Boxer ([facility])  Last Gammagard 7/6/23. Due 8/3, the infusion ordered.   c/w qmonthly infusions after discharge    ID f/u in regards to final abx regimen.     - Dr. JAKE Morales ([facility])  - [phone]

## 2023-08-04 NOTE — DISCHARGE NOTE PROVIDER - HOSPITAL COURSE
Mr. Garrett Cristina is a 57y M with PMH Schizoaffective D/O (Bipolar Type), HTN, HLD, Hypothyroidism, Daily Smoker, CVID/ Hypogammaglobulinemia (monthly IVIG - last 7/6/2023), T2DM, COPD, recent Lt gluteal MRSA abscess/cellulitis w/ MRSA bacteremia s/p Debridement 6/2023 who was admitted for worsening right buttock cellulitis. Symptoms began 4 days prior to admission. Patient initially presented to the ED on 7/27 and was discharged on bactrim, but noted increased redness, edema, and pain around buttock with purulent drainage and so he returned to the hospital. Mr. Garrett Cristina is a 57y M with PMH Schizoaffective D/O (Bipolar Type), HTN, HLD, Hypothyroidism, Daily Smoker, CVID/ Hypogammaglobulinemia (monthly IVIG - last 7/6/2023), T2DM, COPD, recent Lt gluteal MRSA abscess/cellulitis w/ MRSA bacteremia s/p Debridement 6/2023 who was admitted for worsening right buttock cellulitis. Symptoms began 4 days prior to admission. Patient initially presented to the ED on 7/27 and was discharged on bactrim, but noted increased redness, edema, and pain around buttock with purulent drainage and so he returned to the hospital.    During this hospitalization, patient was treated with 1750mg IV vancomycin dosed based on weight. Wound care was consulted and recommended general surgery evaluation. Wound progressed over two days to develop new drainage and skin peeling on the R lower gluteal area with increased pain. Ultrasound of the R buttock showed heterogenous collection concerning for abscess and phlegmon. On 8/3, patient developed copious serosanguinous discharge from gluteal wound accompanied by relief of pain symptoms.    8/3: 2x BM with BRB, likely from wound drainage. Surgery performed bedside I&D.  8/4: Patient reports significantly improved pain, exam showed reduction in edema and erythema though persisting serosanguinous drainage. IVIG gammagard 50mg ordered for patient since he missed outpatient apt, however patient reports he takes gammgard SD and must be pre-treated with benadryl. Pharmacy does not carry gammagard SD so will defer to OP.  8/5: vanc continued, repeat level ordered 8/6 AM  8/6: AM vanc level 19.8, per pharmacy okay to continue current vanc dosing. Patient reports resolution of pain unless sitting directly on wound. Pending ID recs for OP abx.  8/7: Patient reports feeling well, no sx. Wound appears significantly improved, no discharge. Continuing IV vanc. Pending IVIG inpt and ID OP recs.  8/8: ID recommend doxy 100mg q12 x14d on discharge. Per pharamacy Gammagard SD will arrive tomorrow.  8/9: Vanc discontinued due to 25.5 torugh. Gammagard SD scheduled to arrive today.   Mr. Garrett Cristina is a 57y M with PMH Schizoaffective D/O (Bipolar Type), HTN, HLD, Hypothyroidism, Daily Smoker, CVID/ Hypogammaglobulinemia (monthly IVIG - last 7/6/2023), T2DM, COPD, recent Lt gluteal MRSA abscess/cellulitis w/ MRSA bacteremia s/p Debridement 6/2023 who was admitted for worsening right buttock cellulitis. Symptoms began 4 days prior to admission. Patient initially presented to the ED on 7/27 and was discharged on bactrim, but noted increased redness, edema, and pain around buttock with purulent drainage and so he returned to the hospital.    Infectious disease team was consulted and patient was treated with 1750mg IV vancomycin dosed based on weight. Wound care was consulted and recommended general surgery evaluation. Wound progressed over two days to develop new drainage and skin peeling on the R lower gluteal area with increased pain. Ultrasound of the R buttock showed heterogenous collection concerning for abscess and phlegmon. On 8/3, patient developed copious serosanguinous discharge from gluteal wound accompanied by relief of pain symptoms. Patient reported two bloody bowel movements that day, determined to be sanguinous drainage from wound on further examination. Subsequent bowel movements were nonbloody and normal.     Surgery performed bedside I&D of gluteal abscess. Following procedure, patient reported resolution of pain symptoms. On exam prior to discharge, surrounding erythema and edema had resolved in the gluteal region and no further drainage was noted. IV vancomycin was discontinued 8/9 due to vancomycin trough of 25.5. Per infectious disease recommendations, patient will be discharged with a 14-day course of doxycycline 100mg to be taken twice a day.    During hospitalization, patient missed outpatient appointment for IVIG infusion (originally scheduled for 8/3). Thus, patient was given Gammagard S/D (no IgA) on 8/9 following pretreatment with Benadryl 50mg and Tylenol 650mg 30 minutes prior to infusion.       Discharge instructions:  1. Please take the antibiotic doxycycline 100mg twice a day for 14 days  2. Please follow up with Dr. Escudero  3. Please follow wound care recommendations  4. Please follow up with your allergy/immunologist, Dr. Mitchell Boxer, in 1-2 weeks  5. Please return to the hospital if symptoms return or worsen (new fever, increased redness/swelling/pain around wound)

## 2023-08-04 NOTE — PROGRESS NOTE ADULT - PROBLEM SELECTOR PLAN 7
Stable, continue trelegy therapeutic interchange Stable. Patient on monthly Abilify injections (last injection 7/26).

## 2023-08-04 NOTE — DISCHARGE NOTE PROVIDER - NSDCCPTREATMENT_GEN_ALL_CORE_FT
PRINCIPAL PROCEDURE  Procedure: US extremity for localization  Findings and Treatment: In the right buttock, there is heterogeneous collection measuring 4.3 x 3.8 x 2.8 cm with peripheral color Doppler flow and adjacent subcutaneous edema. Findings are suspicious for a abscess and phlegmon. Treated with IV antibiotics and surgical incision & drainage.      SECONDARY PROCEDURE  Procedure: CT abdomen pelvis  Findings and Treatment: Worsening subcutaneous edema in the right median gluteal fold with soft tissue thickening and phlegmonous change in the perianal region and thickening of the levator ani muscle. Gluteal region is excluded from the image and there is regional beam hardening artifact related to patient positioning. No definitive drainable fluid collection within the visualized region of interest or subcutaneous gas.

## 2023-08-04 NOTE — DISCHARGE NOTE PROVIDER - NSDCCPCAREPLAN_GEN_ALL_CORE_FT
PRINCIPAL DISCHARGE DIAGNOSIS  Diagnosis: Cellulitis of buttock  Assessment and Plan of Treatment:       SECONDARY DISCHARGE DIAGNOSES  Diagnosis: TASH (acute kidney injury)  Assessment and Plan of Treatment:      PRINCIPAL DISCHARGE DIAGNOSIS  Diagnosis: Cellulitis of buttock  Assessment and Plan of Treatment: You were found to have a skin infection in your gluteal region. An ultrasound of the infection showed a fluid collection concerning for an abscess. You were treated with IV vancoymcin and the general surgery team performed a bedside drainiage of the infected site. Your symptoms improved with antibiotic treatment and surgical intervention.  Discharge instructions:  1. Please take the antibiotic doxycycline 100mg twice a day for 14 days  2. Please follow up with Dr. Escudero outpatient  3. Please follow wound care recommendations  4. Please return to the hospital if symptoms return or worsen (new fever, increased redness/swelling/pain around wound)      SECONDARY DISCHARGE DIAGNOSES  Diagnosis: CVID (common variable immunodeficiency)  Assessment and Plan of Treatment: You received Gammagard SD 70mg while inpatient for your hypogammaglobulinemia because you missed your outpatient infusion appointment 8/3 due to hospitalization. You received Benadryl 50mg and Tylenol prior to the infusion.  Discharge Instructions:   Please follow up with your allergy/immunologist, Dr. Mitchell Boxer, in 1-2 weeks    Diagnosis: TASH (acute kidney injury)  Assessment and Plan of Treatment: During your hospitalization, your kidney function tests were lower because of your infection and not eating well in the the week prior to being hospitalized. You were given IV fluids and your kidney function returned to baseline.    Diagnosis: HTN (hypertension)  Assessment and Plan of Treatment: Your home dilitiazem medication was paused while inpatient due to low blood pressures in the hospital.   Discharge Instructions:  Please follow up with your primary care provider about restarting your home diltiazem.    Diagnosis: Hyponatremia  Assessment and Plan of Treatment: Your sodium was noted to be low during this hospitalization. Your sodium level was stable in the 130s from 7/31-8/10.   Discharge Instructions:  Follow up with your primary care provider in 1-2 weeks

## 2023-08-04 NOTE — PROGRESS NOTE ADULT - SUBJECTIVE AND OBJECTIVE BOX
SURGERY PROGRESS NOTE    SUBJECTIVE / 24H EVENTS:  Patient seen and examined on morning rounds. No acute events overnight. Patient reports pain controlled overnight, dressing changes once due to drainage.       OBJECTIVE:  VITAL SIGNS:  T(C): 36.2 (08-04-23 @ 05:19), Max: 36.6 (08-03-23 @ 12:55)  HR: 62 (08-04-23 @ 05:19) (58 - 62)  BP: 128/74 (08-04-23 @ 05:19) (128/74 - 147/82)  RR: 17 (08-04-23 @ 05:19) (17 - 18)  SpO2: 98% (08-04-23 @ 05:19) (98% - 100%)  Daily     Daily   POCT Blood Glucose.: 114 mg/dL (08-04-23 @ 08:25)  POCT Blood Glucose.: 130 mg/dL (08-03-23 @ 21:25)  POCT Blood Glucose.: 129 mg/dL (08-03-23 @ 17:34)      PHYSICAL EXAM:  Gen: NAD  LS: Respirations unlabored on RA  GI: Soft. Nontender. Nondistended  Buttock: Improved R gluteal erythema and induration. Wound with serosanguineous drainage        LAB VALUES:  08-04    132<L>  |  96<L>  |  12  ----------------------------<  121<H>  4.1   |  24  |  0.85    Ca    9.5      04 Aug 2023 06:45  Phos  4.7     08-04  Mg     2.00     08-04    TPro  6.5  /  Alb  3.4  /  TBili  <0.2  /  DBili  x   /  AST  18  /  ALT  37  /  AlkPhos  156<H>  08-04                               11.8   6.01  )-----------( 285      ( 04 Aug 2023 06:45 )             36.4     LIVER FUNCTIONS - ( 04 Aug 2023 06:45 )  Alb: 3.4 g/dL / Pro: 6.5 g/dL / ALK PHOS: 156 U/L / ALT: 37 U/L / AST: 18 U/L / GGT: x                   Urinalysis Basic - ( 04 Aug 2023 06:45 )    Color: x / Appearance: x / SG: x / pH: x  Gluc: 121 mg/dL / Ketone: x  / Bili: x / Urobili: x   Blood: x / Protein: x / Nitrite: x   Leuk Esterase: x / RBC: x / WBC x   Sq Epi: x / Non Sq Epi: x / Bacteria: x        MEDICATIONS  (STANDING):  atorvastatin 40 milliGRAM(s) Oral at bedtime  budesonide  80 MICROgram(s)/formoterol 4.5 MICROgram(s) Inhaler 2 Puff(s) Inhalation two times a day  dextrose 5%. 1000 milliLiter(s) (50 mL/Hr) IV Continuous <Continuous>  dextrose 50% Injectable 25 Gram(s) IV Push once  glucagon  Injectable 1 milliGRAM(s) IntraMuscular once  heparin   Injectable 5000 Unit(s) SubCutaneous every 8 hours  immune   globulin 10% (GAMMAGARD) IVPB 70 Gram(s) IV Intermittent once  insulin lispro (ADMELOG) corrective regimen sliding scale   SubCutaneous three times a day before meals  insulin lispro (ADMELOG) corrective regimen sliding scale   SubCutaneous at bedtime  levothyroxine 50 MICROGram(s) Oral daily  sodium chloride 0.9%. 1000 milliLiter(s) (100 mL/Hr) IV Continuous <Continuous>  tiotropium 2.5 MICROgram(s) Inhaler 2 Puff(s) Inhalation daily  vancomycin  IVPB 1750 milliGRAM(s) IV Intermittent every 12 hours    MEDICATIONS  (PRN):  acetaminophen     Tablet .. 650 milliGRAM(s) Oral every 6 hours PRN Temp greater or equal to 38C (100.4F), Mild Pain (1 - 3)  dextrose Oral Gel 15 Gram(s) Oral once PRN Blood Glucose LESS THAN 70 milliGRAM(s)/deciliter  lidocaine   4% Patch 1 Patch Transdermal once PRN moderate pain        SURGERY PROGRESS NOTE    SUBJECTIVE / 24H EVENTS:  Patient seen and examined on morning rounds. No acute events overnight. Patient reports pain controlled overnight, dressing changed once due to drainage.       OBJECTIVE:  VITAL SIGNS:  T(C): 36.2 (08-04-23 @ 05:19), Max: 36.6 (08-03-23 @ 12:55)  HR: 62 (08-04-23 @ 05:19) (58 - 62)  BP: 128/74 (08-04-23 @ 05:19) (128/74 - 147/82)  RR: 17 (08-04-23 @ 05:19) (17 - 18)  SpO2: 98% (08-04-23 @ 05:19) (98% - 100%)  Daily     Daily   POCT Blood Glucose.: 114 mg/dL (08-04-23 @ 08:25)  POCT Blood Glucose.: 130 mg/dL (08-03-23 @ 21:25)  POCT Blood Glucose.: 129 mg/dL (08-03-23 @ 17:34)      PHYSICAL EXAM:  Gen: NAD  LS: Respirations unlabored on RA  GI: Soft. Nontender. Nondistended  Buttock: Improved R gluteal erythema and induration. Wound with serosanguineous drainage        LAB VALUES:  08-04    132<L>  |  96<L>  |  12  ----------------------------<  121<H>  4.1   |  24  |  0.85    Ca    9.5      04 Aug 2023 06:45  Phos  4.7     08-04  Mg     2.00     08-04    TPro  6.5  /  Alb  3.4  /  TBili  <0.2  /  DBili  x   /  AST  18  /  ALT  37  /  AlkPhos  156<H>  08-04                               11.8   6.01  )-----------( 285      ( 04 Aug 2023 06:45 )             36.4     LIVER FUNCTIONS - ( 04 Aug 2023 06:45 )  Alb: 3.4 g/dL / Pro: 6.5 g/dL / ALK PHOS: 156 U/L / ALT: 37 U/L / AST: 18 U/L / GGT: x                   Urinalysis Basic - ( 04 Aug 2023 06:45 )    Color: x / Appearance: x / SG: x / pH: x  Gluc: 121 mg/dL / Ketone: x  / Bili: x / Urobili: x   Blood: x / Protein: x / Nitrite: x   Leuk Esterase: x / RBC: x / WBC x   Sq Epi: x / Non Sq Epi: x / Bacteria: x        MEDICATIONS  (STANDING):  atorvastatin 40 milliGRAM(s) Oral at bedtime  budesonide  80 MICROgram(s)/formoterol 4.5 MICROgram(s) Inhaler 2 Puff(s) Inhalation two times a day  dextrose 5%. 1000 milliLiter(s) (50 mL/Hr) IV Continuous <Continuous>  dextrose 50% Injectable 25 Gram(s) IV Push once  glucagon  Injectable 1 milliGRAM(s) IntraMuscular once  heparin   Injectable 5000 Unit(s) SubCutaneous every 8 hours  immune   globulin 10% (GAMMAGARD) IVPB 70 Gram(s) IV Intermittent once  insulin lispro (ADMELOG) corrective regimen sliding scale   SubCutaneous three times a day before meals  insulin lispro (ADMELOG) corrective regimen sliding scale   SubCutaneous at bedtime  levothyroxine 50 MICROGram(s) Oral daily  sodium chloride 0.9%. 1000 milliLiter(s) (100 mL/Hr) IV Continuous <Continuous>  tiotropium 2.5 MICROgram(s) Inhaler 2 Puff(s) Inhalation daily  vancomycin  IVPB 1750 milliGRAM(s) IV Intermittent every 12 hours    MEDICATIONS  (PRN):  acetaminophen     Tablet .. 650 milliGRAM(s) Oral every 6 hours PRN Temp greater or equal to 38C (100.4F), Mild Pain (1 - 3)  dextrose Oral Gel 15 Gram(s) Oral once PRN Blood Glucose LESS THAN 70 milliGRAM(s)/deciliter  lidocaine   4% Patch 1 Patch Transdermal once PRN moderate pain

## 2023-08-04 NOTE — PROGRESS NOTE ADULT - PROBLEM SELECTOR PLAN 4
Patient takes metformin at home.  - low dose ISS while inpt Cr 1.46 on admission, up from baseline of 0.8. FeNa 0.2% suggested pre-renal etiology, likely secondary to sepsis and poor PO intake. S/p 1L NS, now back to baseline (0.98 7/31, 0.89 on 8/1).  - continue to monitor Cr  - avoid nephrotoxins, renally dose meds

## 2023-08-04 NOTE — PROGRESS NOTE ADULT - ASSESSMENT
57 M PMHx Schizoaffective D/O (Bipolar Type), HTN, HLD, Hypothyroidism, Daily Smoker, CVID/ Hypogammaglobulinemia (monthly IVIG - last 7/6/2023), T2DM, COPD, recent Lt gluteal MRSA abscess/cellulitis w/ MRSA bacteremia s/p Debridement 6/2023 presenting with worsening right buttock cellulitis  Fever, leukocytosis  Prior MRSA bacteremia  Prior MRSA wound infection  Multiple episodes of recurring abscess/gluteal infection 2/2 MRSA  CT worsening edema R gluteal fold, phlegmonous change perineal, no drainable lesion (7/29)  UA neg  Gluteal SSTI  S/p I+D 8/3, culture pending  Overall, Fever, SSTI recurring, hypogammaglobulinemia  - Vanco 1750mg q 12 (continue--monitor levels)  - F/U BCXs  - Surgical eval to wound/wound care  - Trend WBC to normal  - F/U pending culture from I+D  - Final antibiotic plan pending culture result    My colleagues will be covering this patient starting on 8/5/23, I will return 8/8/23. Please call [phone] or on call fellow with any questions or change in status.     Josr Downs MD  Contact on TEAMS messaging from 9am - 5pm  From 5pm-9am, on weekends, or if no response call [phone]

## 2023-08-04 NOTE — DISCHARGE NOTE PROVIDER - CARE PROVIDER_API CALL
Nichelle Diaz  Surgery  [address] [facility], [address] [place] [address]  Phone: [phone]  Fax: [phone]  Follow Up Time: 2 weeks   Nichelle Diaz  Surgery  [address] [facility], [address]nd [place] [address]  Phone: [phone]  Fax: [phone]  Follow Up Time: 1 week

## 2023-08-04 NOTE — DISCHARGE NOTE PROVIDER - PROVIDER TOKENS
PROVIDER:[TOKEN:[[id number]],FOLLOWUP:[2 weeks]] PROVIDER:[TOKEN:[[id number]],FOLLOWUP:[1 week]]

## 2023-08-04 NOTE — PROGRESS NOTE ADULT - PROBLEM SELECTOR PLAN 9
DVT ppx: heparin 5000u SQ q8h  Diet: dash/tlc, cc  Code status: full code  Dispo: pending clinical improvement

## 2023-08-04 NOTE — PROGRESS NOTE ADULT - PROBLEM SELECTOR PLAN 5
Held home diltiazem in setting of sepsis Patient takes metformin at home.  - low dose ISS while inpt

## 2023-08-04 NOTE — PROGRESS NOTE ADULT - PROBLEM SELECTOR PLAN 6
Stable. Patient on monthly Abilify injections (last injection 7/26). Held home diltiazem in setting of sepsis

## 2023-08-04 NOTE — PROGRESS NOTE ADULT - ASSESSMENT
ASSESSMENT/PLAN: 57y M PMHx Schizoaffective D/O (Bipolar Type), HTN, HLD, Hypothyroidism, Daily Smoker, CVID/ Hypogammaglobulinemia (monthly IVIG - last 7/6/2023), T2DM, COPD, recent Lt gluteal MRSA abscess/cellulitis w/ MRSA bacteremia s/p Debridement 6/2023 presenting with worsening right buttock cellulitis. CT 07/29 with incompletely imaged gluteal region, U/S with evidence of collection. Now s/p I&D of right buttock abscess 08/03    - Wound care evaluation   - Continue abx per primary team   - Pain control PRN  - Daily dressing changes    B Team Surgery (Acute Care Surgery)  [id number]   ASSESSMENT/PLAN: 57y M PMHx Schizoaffective D/O (Bipolar Type), HTN, HLD, Hypothyroidism, Daily Smoker, CVID/ Hypogammaglobulinemia (monthly IVIG - last 7/6/2023), T2DM, COPD, recent Lt gluteal MRSA abscess/cellulitis w/ MRSA bacteremia s/p Debridement 6/2023 presenting with worsening right buttock cellulitis. CT 07/29 with incompletely imaged gluteal region, U/S with evidence of collection. Now s/p I&D of right buttock abscess 08/03    - Wound care evaluation for dressing recs  - Continue abx per primary team   - Pain control PRN  - Pleas page back with further questions    B Team Surgery (Acute Care Surgery)  [id number]   ASSESSMENT/PLAN: 57y M PMHx Schizoaffective D/O (Bipolar Type), HTN, HLD, Hypothyroidism, Daily Smoker, CVID/ Hypogammaglobulinemia (monthly IVIG - last 7/6/2023), T2DM, COPD, recent Lt gluteal MRSA abscess/cellulitis w/ MRSA bacteremia s/p Debridement 6/2023 presenting with worsening right buttock cellulitis. CT 07/29 with incompletely imaged gluteal region, U/S with evidence of collection. Now s/p I&D of right buttock abscess 08/03    - Wound care evaluation for dressing recs  - Continue abx per primary team   - Pain control PRN  - Follow-up in clinic in 2 weeks. Information added to d/c summary  - Pleas page back with further questions    B Team Surgery (Acute Care Surgery)  [id number]   ASSESSMENT/PLAN: 57y M PMHx Schizoaffective D/O (Bipolar Type), HTN, HLD, Hypothyroidism, Daily Smoker, CVID/ Hypogammaglobulinemia (monthly IVIG - last 7/6/2023), T2DM, COPD, recent Lt gluteal MRSA abscess/cellulitis w/ MRSA bacteremia s/p Debridement 6/2023 presenting with worsening right buttock cellulitis. CT 07/29 with incompletely imaged gluteal region, U/S with evidence of collection. Now s/p I&D of right buttock abscess 08/03    - Wound care evaluation for dressing recs  - Continue abx per primary team   - Pain control PRN  - Follow-up dorita Diaz in 1 week. Information added to discharge summary  - Pleas page back with further questions    B Team Surgery (Acute Care Surgery)  [id number]   ASSESSMENT/PLAN: 57y M PMHx Schizoaffective D/O (Bipolar Type), HTN, HLD, Hypothyroidism, Daily Smoker, CVID/ Hypogammaglobulinemia (monthly IVIG - last 7/6/2023), T2DM, COPD, recent Lt gluteal MRSA abscess/cellulitis w/ MRSA bacteremia s/p Debridement 6/2023 presenting with worsening right buttock cellulitis. CT 07/29 with incompletely imaged gluteal region, U/S with evidence of collection. Now s/p I&D of right buttock abscess 08/03    - Wound care evaluation for dressing recs  - Continue abx per primary team   - Pain control PRN  - Follow-up with Dr. Diaz within 1 week after discharge. Information added to discharge summary  - Pleas page back with further questions    B Team Surgery (Acute Care Surgery)  [id number]

## 2023-08-04 NOTE — DISCHARGE NOTE PROVIDER - NSDCMRMEDTOKEN_GEN_ALL_CORE_FT
Abilify Maintena Prefilled Syringe 300 mg intramuscular injection, extended release: 300 milligram(s) intramuscularly once a month  Albuterol (Eqv-ProAir HFA) 90 mcg/inh inhalation aerosol: 2 puff(s) inhaled every 6 hours, As Needed    Last filled 3/2022  dilTIAZem 300 mg/24 hours oral capsule, extended release: 1 cap(s) orally once a day (in the morning)  levothyroxine 50 mcg (0.05 mg) oral tablet: 1 tab(s) orally once a day  metFORMIN 500 mg oral tablet: 1 tab(s) orally 2 times a day  rosuvastatin 10 mg oral tablet: 1 tab(s) orally once a day  Trelegy Ellipta 100 mcg-62.5 mcg-25 mcg/inh inhalation powder: 1 puff(s) inhaled once a day   Abilify Maintena Prefilled Syringe 300 mg intramuscular injection, extended release: 300 milligram(s) intramuscularly once a month  Albuterol (Eqv-ProAir HFA) 90 mcg/inh inhalation aerosol: 2 puff(s) inhaled every 6 hours, As Needed    Last filled 3/2022  doxycycline hyclate 100 mg oral tablet: 1 tab(s) orally 2 times a day  levothyroxine 50 mcg (0.05 mg) oral tablet: 1 tab(s) orally once a day  metFORMIN 500 mg oral tablet: 1 tab(s) orally 2 times a day  rosuvastatin 10 mg oral tablet: 1 tab(s) orally once a day  Trelegy Ellipta 100 mcg-62.5 mcg-25 mcg/inh inhalation powder: 1 puff(s) inhaled once a day

## 2023-08-04 NOTE — PROGRESS NOTE ADULT - SUBJECTIVE AND OBJECTIVE BOX
CC: F/U for Wound infection    Saw/spoke to patient. No fevers, no chills. No new complaints.    Allergies  penicillin (Rash)  amoxicillin (Fever)    ANTIMICROBIALS:  vancomycin  IVPB 1750 every 12 hours    PE:    Vital Signs Last 24 Hrs  T(C): 36.3 (04 Aug 2023 13:06), Max: 36.4 (03 Aug 2023 22:00)  T(F): 97.3 (04 Aug 2023 13:06), Max: 97.6 (03 Aug 2023 22:00)  HR: 62 (04 Aug 2023 13:06) (58 - 62)  BP: 155/86 (04 Aug 2023 13:06) (128/74 - 155/86)  RR: 18 (04 Aug 2023 13:06) (17 - 18)  SpO2: 100% (04 Aug 2023 13:06) (98% - 100%)    Gen: AOx3, NAD, non-toxic  CV: Nontachycardic  Resp: Breathing comfortably, RA  Abd: Soft, nontender  IV/Skin: No thrombophlebitis    LABS:                        11.8   6.01  )-----------( 285      ( 04 Aug 2023 06:45 )             36.4     08-04    132<L>  |  96<L>  |  12  ----------------------------<  121<H>  4.1   |  24  |  0.85    Ca    9.5      04 Aug 2023 06:45  Phos  4.7     08-04  Mg     2.00     08-04    TPro  6.5  /  Alb  3.4  /  TBili  <0.2  /  DBili  x   /  AST  18  /  ALT  37  /  AlkPhos  156<H>  08-04    Urinalysis Basic - ( 04 Aug 2023 06:45 )    Color: x / Appearance: x / SG: x / pH: x  Gluc: 121 mg/dL / Ketone: x  / Bili: x / Urobili: x   Blood: x / Protein: x / Nitrite: x   Leuk Esterase: x / RBC: x / WBC x   Sq Epi: x / Non Sq Epi: x / Bacteria: x    MICROBIOLOGY:  Vancomycin Level, Trough: 12.6 ug/mL (08-03-23 @ 18:00)    .Blood Blood-Peripheral  07-29-23   No growth at 5 days  --  --    .Blood Blood-Peripheral  07-29-23   No growth at 5 days  --  --    .Abscess Right perineum  07-17-23   Few Methicillin Resistant Staphylococcus aureus  --  Methicillin resistant Staphylococcus aureus    .Blood Blood-Peripheral  07-15-23   No growth at 5 days  --  --    .Blood Blood-Peripheral  07-15-23   No growth at 5 days  --  --    Clean Catch Clean Catch (Midstream)  07-14-23   <10,000 CFU/mL Normal Urogenital Sydni  --  --    (otherwise reviewed)    RADIOLOGY:    8/2 USG:    FINDINGS/  IMPRESSION:    In the right buttock, there is heterogeneous collection measuring 4.3 x   3.8 x 2.8 cm with peripheral color Doppler flow and adjacent subcutaneous   edema. Findings are suspicious for a abscess and phlegmon.

## 2023-08-04 NOTE — PROGRESS NOTE ADULT - PROBLEM SELECTOR PLAN 1
Patient presenting with worsening buttock cellulitis; patient has presented with buttock SSTI 4x in the last 4 months. CT abd/pelvis w/o evidence of any drainable collection or subcutaneous gas. S/p vanc and cefepime in ED, now on vancomycin. ID and wound care following, appreciate recs. Vanc trough 10.2 on 7/31 and 11.8 on repeat, will maintain current dose per ID. BCx neg at 72h. Ultrasound concerning for phlegmon and abscess.  - continue IV vanc 1750mg q12h (first dose 7/29)  - gen surg following, appreciate recs: plan for bedside drainage today  - wound care following, appreciate recs Patient presenting with worsening buttock cellulitis; patient has presented with buttock SSTI 4x in the last 4 months. CT abd/pelvis w/o evidence of any drainable collection or subcutaneous gas. S/p vanc and cefepime in ED, now on vancomycin. ID and wound care following, appreciate recs. Vanc trough 10.2 on 7/31 and 11.8 on repeat, will maintain current dose per ID. BCx neg at 72h. Ultrasound concerning for phlegmon and abscess. Now s/p gen surg I&D with improved pain/erythema/swelling but persisting sanguinous drainage.  - continue IV vanc 1750mg q12h (first dose 7/29)  - wound care following, appreciate recs  - gen surg following, appreciate recs: f/u outpatient with Dr. Diaz within 1 week after discharge

## 2023-08-04 NOTE — DISCHARGE NOTE PROVIDER - NSDCFUSCHEDAPPT_GEN_ALL_CORE_FT
[facility]  [address]  Scheduled Appointment: 08/31/2023    Boxer, Mitchell B  [facility]  [address]  Scheduled Appointment: 09/20/2023    [facility]  [address]  Scheduled Appointment: 09/28/2023    [facility]  [address]  Scheduled Appointment: 10/26/2023     [facility]  [address] [facility]  Scheduled Appointment: 08/31/2023    Nichelle Diaz  [facility]  WOUNDCARE [address]  Scheduled Appointment: 09/07/2023    Boxer, Mitchell B  [facility]  [address]  Scheduled Appointment: 09/20/2023    [facility]  [address] [facility] [address]  Scheduled Appointment: 09/28/2023    [facility]  [address] [facility]  Scheduled Appointment: 10/26/2023     Nichelle Diaz  [facility]  WOUNDCARE [address]  Scheduled Appointment: 08/15/2023    [facility]  [address]  Scheduled Appointment: 08/31/2023    Boxer, Mitchell B  [facility]  [address]  Scheduled Appointment: 09/20/2023    [facility]  [address] [facility]  Scheduled Appointment: 09/28/2023    [facility]  [address] [facility]  Scheduled Appointment: 10/26/2023

## 2023-08-04 NOTE — PROGRESS NOTE ADULT - SUBJECTIVE AND OBJECTIVE BOX
DATE OF SERVICE: 08-04-23 @ 07:59    Patient is a 57y old  Male who presents with a chief complaint of sepsis (03 Aug 2023 11:12)      SUBJECTIVE / OVERNIGHT EVENTS:        MEDICATIONS  (STANDING):  atorvastatin 40 milliGRAM(s) Oral at bedtime  budesonide  80 MICROgram(s)/formoterol 4.5 MICROgram(s) Inhaler 2 Puff(s) Inhalation two times a day  dextrose 5%. 1000 milliLiter(s) (50 mL/Hr) IV Continuous <Continuous>  dextrose 50% Injectable 25 Gram(s) IV Push once  glucagon  Injectable 1 milliGRAM(s) IntraMuscular once  heparin   Injectable 5000 Unit(s) SubCutaneous every 8 hours  immune   globulin 10% (GAMMAGARD) IVPB 70 Gram(s) IV Intermittent once  insulin lispro (ADMELOG) corrective regimen sliding scale   SubCutaneous three times a day before meals  insulin lispro (ADMELOG) corrective regimen sliding scale   SubCutaneous at bedtime  levothyroxine 50 MICROGram(s) Oral daily  sodium chloride 0.9%. 1000 milliLiter(s) (100 mL/Hr) IV Continuous <Continuous>  tiotropium 2.5 MICROgram(s) Inhaler 2 Puff(s) Inhalation daily  vancomycin  IVPB 1750 milliGRAM(s) IV Intermittent every 12 hours    MEDICATIONS  (PRN):  acetaminophen     Tablet .. 650 milliGRAM(s) Oral every 6 hours PRN Temp greater or equal to 38C (100.4F), Mild Pain (1 - 3)  dextrose Oral Gel 15 Gram(s) Oral once PRN Blood Glucose LESS THAN 70 milliGRAM(s)/deciliter  lidocaine   4% Patch 1 Patch Transdermal once PRN moderate pain      Vital Signs Last 24 Hrs  T(C): 36.2 (04 Aug 2023 05:19), Max: 36.6 (03 Aug 2023 12:55)  T(F): 97.2 (04 Aug 2023 05:19), Max: 97.9 (03 Aug 2023 12:55)  HR: 62 (04 Aug 2023 05:19) (58 - 62)  BP: 128/74 (04 Aug 2023 05:19) (128/74 - 147/82)  BP(mean): --  RR: 17 (04 Aug 2023 05:19) (17 - 18)  SpO2: 98% (04 Aug 2023 05:19) (98% - 100%)    Parameters below as of 04 Aug 2023 05:19  Patient On (Oxygen Delivery Method): room air      CAPILLARY BLOOD GLUCOSE  POCT Blood Glucose.: 130 mg/dL (03 Aug 2023 21:25)  POCT Blood Glucose.: 129 mg/dL (03 Aug 2023 17:34)  POCT Blood Glucose.: 108 mg/dL (03 Aug 2023 12:10)  POCT Blood Glucose.: 132 mg/dL (03 Aug 2023 08:23)        PHYSICAL EXAM:  GENERAL: NAD, well-developed  HEAD:  Atraumatic, Normocephalic  EYES: EOMI, PERRLA, conjunctiva and sclera clear  NECK: Supple, No JVD  CHEST/LUNG: Clear to auscultation bilaterally; No wheeze  HEART: Regular rate and rhythm; No murmurs, rubs, or gallops  ABDOMEN: Soft, Nontender, Nondistended; Bowel sounds present  EXTREMITIES:  2+ Peripheral Pulses, No clubbing, cyanosis, or edema  PSYCH: AAOx3  NEUROLOGY: non-focal  SKIN: No rashes or lesions      LABS:                     RADIOLOGY & ADDITIONAL TESTS:    Imaging Personally Reviewed:    Consultant(s) Notes Reviewed:      Care Discussed with Consultants/Other Providers:   DATE OF SERVICE: 08-04-23 @ 07:59    Patient is a 57y old  Male who presents with a chief complaint of sepsis (03 Aug 2023 11:12)      SUBJECTIVE / OVERNIGHT EVENTS:  Gen surg performed I&D of gluteal abscess yesterday evening  Patient reports feeling well this morning with pain rated 1-2/10.  Patient denies nausea/vomiting/fevers/chills. Reports normal appetite for the last few days.      MEDICATIONS  (STANDING):  atorvastatin 40 milliGRAM(s) Oral at bedtime  budesonide  80 MICROgram(s)/formoterol 4.5 MICROgram(s) Inhaler 2 Puff(s) Inhalation two times a day  dextrose 5%. 1000 milliLiter(s) (50 mL/Hr) IV Continuous <Continuous>  dextrose 50% Injectable 25 Gram(s) IV Push once  glucagon  Injectable 1 milliGRAM(s) IntraMuscular once  heparin   Injectable 5000 Unit(s) SubCutaneous every 8 hours  immune   globulin 10% (GAMMAGARD) IVPB 70 Gram(s) IV Intermittent once  insulin lispro (ADMELOG) corrective regimen sliding scale   SubCutaneous three times a day before meals  insulin lispro (ADMELOG) corrective regimen sliding scale   SubCutaneous at bedtime  levothyroxine 50 MICROGram(s) Oral daily  sodium chloride 0.9%. 1000 milliLiter(s) (100 mL/Hr) IV Continuous <Continuous>  tiotropium 2.5 MICROgram(s) Inhaler 2 Puff(s) Inhalation daily  vancomycin  IVPB 1750 milliGRAM(s) IV Intermittent every 12 hours    MEDICATIONS  (PRN):  acetaminophen     Tablet .. 650 milliGRAM(s) Oral every 6 hours PRN Temp greater or equal to 38C (100.4F), Mild Pain (1 - 3)  dextrose Oral Gel 15 Gram(s) Oral once PRN Blood Glucose LESS THAN 70 milliGRAM(s)/deciliter  lidocaine   4% Patch 1 Patch Transdermal once PRN moderate pain      Vital Signs Last 24 Hrs  T(C): 36.2 (04 Aug 2023 05:19), Max: 36.6 (03 Aug 2023 12:55)  T(F): 97.2 (04 Aug 2023 05:19), Max: 97.9 (03 Aug 2023 12:55)  HR: 62 (04 Aug 2023 05:19) (58 - 62)  BP: 128/74 (04 Aug 2023 05:19) (128/74 - 147/82)  BP(mean): --  RR: 17 (04 Aug 2023 05:19) (17 - 18)  SpO2: 98% (04 Aug 2023 05:19) (98% - 100%)    Parameters below as of 04 Aug 2023 05:19  Patient On (Oxygen Delivery Method): room air      CAPILLARY BLOOD GLUCOSE  POCT Blood Glucose.: 130 mg/dL (03 Aug 2023 21:25)  POCT Blood Glucose.: 129 mg/dL (03 Aug 2023 17:34)  POCT Blood Glucose.: 108 mg/dL (03 Aug 2023 12:10)  POCT Blood Glucose.: 132 mg/dL (03 Aug 2023 08:23)        PHYSICAL EXAM:  GENERAL: NAD, well-developed  HEAD:  Atraumatic, Normocephalic  EYES: EOMI, PERRLA, conjunctiva and sclera clear  NECK: Supple, No JVD  CHEST/LUNG: Clear to auscultation bilaterally; No wheeze  HEART: Regular rate and rhythm; No murmurs, rubs, or gallops  ABDOMEN: Soft, Nontender, Nondistended; Bowel sounds present  EXTREMITIES:  2+ Peripheral Pulses, No clubbing, cyanosis, or edema  PSYCH: AAOx3  NEUROLOGY: non-focal  SKIN: No rashes or lesions      LABS:                     RADIOLOGY & ADDITIONAL TESTS:    Imaging Personally Reviewed:    Consultant(s) Notes Reviewed:      Care Discussed with Consultants/Other Providers:   DATE OF SERVICE: 08-04-23 @ 07:59    Patient is a 57y old  Male who presents with a chief complaint of sepsis (03 Aug 2023 11:12)      SUBJECTIVE / OVERNIGHT EVENTS:  Gen surg performed I&D of gluteal abscess yesterday evening  Patient reports feeling well this morning with pain rated 1-2/10.  Patient denies nausea/vomiting/fevers/chills. Reports normal appetite for the last few days.      MEDICATIONS  (STANDING):  atorvastatin 40 milliGRAM(s) Oral at bedtime  budesonide  80 MICROgram(s)/formoterol 4.5 MICROgram(s) Inhaler 2 Puff(s) Inhalation two times a day  dextrose 5%. 1000 milliLiter(s) (50 mL/Hr) IV Continuous <Continuous>  dextrose 50% Injectable 25 Gram(s) IV Push once  glucagon  Injectable 1 milliGRAM(s) IntraMuscular once  heparin   Injectable 5000 Unit(s) SubCutaneous every 8 hours  immune   globulin 10% (GAMMAGARD) IVPB 70 Gram(s) IV Intermittent once  insulin lispro (ADMELOG) corrective regimen sliding scale   SubCutaneous three times a day before meals  insulin lispro (ADMELOG) corrective regimen sliding scale   SubCutaneous at bedtime  levothyroxine 50 MICROGram(s) Oral daily  sodium chloride 0.9%. 1000 milliLiter(s) (100 mL/Hr) IV Continuous <Continuous>  tiotropium 2.5 MICROgram(s) Inhaler 2 Puff(s) Inhalation daily  vancomycin  IVPB 1750 milliGRAM(s) IV Intermittent every 12 hours    MEDICATIONS  (PRN):  acetaminophen     Tablet .. 650 milliGRAM(s) Oral every 6 hours PRN Temp greater or equal to 38C (100.4F), Mild Pain (1 - 3)  dextrose Oral Gel 15 Gram(s) Oral once PRN Blood Glucose LESS THAN 70 milliGRAM(s)/deciliter  lidocaine   4% Patch 1 Patch Transdermal once PRN moderate pain      Vital Signs Last 24 Hrs  T(C): 36.2 (04 Aug 2023 05:19), Max: 36.6 (03 Aug 2023 12:55)  T(F): 97.2 (04 Aug 2023 05:19), Max: 97.9 (03 Aug 2023 12:55)  HR: 62 (04 Aug 2023 05:19) (58 - 62)  BP: 128/74 (04 Aug 2023 05:19) (128/74 - 147/82)  BP(mean): --  RR: 17 (04 Aug 2023 05:19) (17 - 18)  SpO2: 98% (04 Aug 2023 05:19) (98% - 100%)    Parameters below as of 04 Aug 2023 05:19  Patient On (Oxygen Delivery Method): room air      CAPILLARY BLOOD GLUCOSE  POCT Blood Glucose.: 130 mg/dL (03 Aug 2023 21:25)  POCT Blood Glucose.: 129 mg/dL (03 Aug 2023 17:34)  POCT Blood Glucose.: 108 mg/dL (03 Aug 2023 12:10)  POCT Blood Glucose.: 132 mg/dL (03 Aug 2023 08:23)        PHYSICAL EXAM:  GENERAL: NAD, well-developed  HEAD:  Atraumatic, Normocephalic  EYES: conjunctiva and sclera clear  NECK: Supple  CHEST/LUNG: Clear to auscultation bilaterally; No wheeze  HEART: Regular rate and rhythm; No murmurs, rubs, or gallops  ABDOMEN: Soft, Nontender, Nondistended; Bowel sounds present  EXTREMITIES:  No cyanosis or edema  PSYCH: AAOx3  NEUROLOGY: non-focal  SKIN: Copious sanguinous drainage staining dressing taped over gluteal wound. Notable reduced erythema/edema on the R gluteal region.      LABS:                        11.8   6.01  )-----------( 285      ( 04 Aug 2023 06:45 )             36.4     08-04    132<L>  |  96<L>  |  12  ----------------------------<  121<H>  4.1   |  24  |  0.85    Ca    9.5      04 Aug 2023 06:45  Phos  4.7     08-04  Mg     2.00     08-04    TPro  6.5  /  Alb  3.4  /  TBili  <0.2  /  DBili  x   /  AST  18  /  ALT  37  /  AlkPhos  156<H>  08-04    Urinalysis Basic - ( 04 Aug 2023 06:45 )  Color: x / Appearance: x / SG: x / pH: x  Gluc: 121 mg/dL / Ketone: x  / Bili: x / Urobili: x   Blood: x / Protein: x / Nitrite: x   Leuk Esterase: x / RBC: x / WBC x   Sq Epi: x / Non Sq Epi: x / Bacteria: x      RADIOLOGY & ADDITIONAL TESTS: no new imaging    Imaging Personally Reviewed: n/a  Consultant(s) Notes Reviewed:  yes  Care Discussed with Consultants/Other Providers: yes

## 2023-08-05 LAB
-  AMPICILLIN/SULBACTAM: SIGNIFICANT CHANGE UP
-  CEFAZOLIN: SIGNIFICANT CHANGE UP
-  CLINDAMYCIN: SIGNIFICANT CHANGE UP
-  DAPTOMYCIN: SIGNIFICANT CHANGE UP
-  ERYTHROMYCIN: SIGNIFICANT CHANGE UP
-  GENTAMICIN: SIGNIFICANT CHANGE UP
-  LINEZOLID: SIGNIFICANT CHANGE UP
-  OXACILLIN: SIGNIFICANT CHANGE UP
-  PENICILLIN: SIGNIFICANT CHANGE UP
-  RIFAMPIN: SIGNIFICANT CHANGE UP
-  TETRACYCLINE: SIGNIFICANT CHANGE UP
-  TRIMETHOPRIM/SULFAMETHOXAZOLE: SIGNIFICANT CHANGE UP
-  VANCOMYCIN: SIGNIFICANT CHANGE UP
ALBUMIN SERPL ELPH-MCNC: 3.4 G/DL — SIGNIFICANT CHANGE UP (ref 3.3–5)
ALP SERPL-CCNC: 147 U/L — HIGH (ref 40–120)
ALT FLD-CCNC: 33 U/L — SIGNIFICANT CHANGE UP (ref 4–41)
ANION GAP SERPL CALC-SCNC: 11 MMOL/L — SIGNIFICANT CHANGE UP (ref 7–14)
AST SERPL-CCNC: 21 U/L — SIGNIFICANT CHANGE UP (ref 4–40)
BILIRUB SERPL-MCNC: <0.2 MG/DL — SIGNIFICANT CHANGE UP (ref 0.2–1.2)
BUN SERPL-MCNC: 13 MG/DL — SIGNIFICANT CHANGE UP (ref 7–23)
CALCIUM SERPL-MCNC: 9.5 MG/DL — SIGNIFICANT CHANGE UP (ref 8.4–10.5)
CHLORIDE SERPL-SCNC: 97 MMOL/L — LOW (ref 98–107)
CO2 SERPL-SCNC: 24 MMOL/L — SIGNIFICANT CHANGE UP (ref 22–31)
CREAT SERPL-MCNC: 0.89 MG/DL — SIGNIFICANT CHANGE UP (ref 0.5–1.3)
EGFR: 101 ML/MIN/1.73M2 — SIGNIFICANT CHANGE UP
GLUCOSE BLDC GLUCOMTR-MCNC: 118 MG/DL — HIGH (ref 70–99)
GLUCOSE BLDC GLUCOMTR-MCNC: 119 MG/DL — HIGH (ref 70–99)
GLUCOSE BLDC GLUCOMTR-MCNC: 128 MG/DL — HIGH (ref 70–99)
GLUCOSE BLDC GLUCOMTR-MCNC: 92 MG/DL — SIGNIFICANT CHANGE UP (ref 70–99)
GLUCOSE SERPL-MCNC: 96 MG/DL — SIGNIFICANT CHANGE UP (ref 70–99)
HCT VFR BLD CALC: 36.9 % — LOW (ref 39–50)
HGB BLD-MCNC: 12.1 G/DL — LOW (ref 13–17)
MAGNESIUM SERPL-MCNC: 1.9 MG/DL — SIGNIFICANT CHANGE UP (ref 1.6–2.6)
MCHC RBC-ENTMCNC: 27.1 PG — SIGNIFICANT CHANGE UP (ref 27–34)
MCHC RBC-ENTMCNC: 32.8 GM/DL — SIGNIFICANT CHANGE UP (ref 32–36)
MCV RBC AUTO: 82.7 FL — SIGNIFICANT CHANGE UP (ref 80–100)
METHOD TYPE: SIGNIFICANT CHANGE UP
NRBC # BLD: 0 /100 WBCS — SIGNIFICANT CHANGE UP (ref 0–0)
NRBC # FLD: 0 K/UL — SIGNIFICANT CHANGE UP (ref 0–0)
PHOSPHATE SERPL-MCNC: 5.3 MG/DL — HIGH (ref 2.5–4.5)
PLATELET # BLD AUTO: 272 K/UL — SIGNIFICANT CHANGE UP (ref 150–400)
POTASSIUM SERPL-MCNC: 4.4 MMOL/L — SIGNIFICANT CHANGE UP (ref 3.5–5.3)
POTASSIUM SERPL-SCNC: 4.4 MMOL/L — SIGNIFICANT CHANGE UP (ref 3.5–5.3)
PROT SERPL-MCNC: 6.2 G/DL — SIGNIFICANT CHANGE UP (ref 6–8.3)
RBC # BLD: 4.46 M/UL — SIGNIFICANT CHANGE UP (ref 4.2–5.8)
RBC # FLD: 15.1 % — HIGH (ref 10.3–14.5)
SODIUM SERPL-SCNC: 132 MMOL/L — LOW (ref 135–145)
VANCOMYCIN TROUGH SERPL-MCNC: 17.1 UG/ML — SIGNIFICANT CHANGE UP (ref 10–20)
WBC # BLD: 6.56 K/UL — SIGNIFICANT CHANGE UP (ref 3.8–10.5)
WBC # FLD AUTO: 6.56 K/UL — SIGNIFICANT CHANGE UP (ref 3.8–10.5)

## 2023-08-05 RX ADMIN — ATORVASTATIN CALCIUM 40 MILLIGRAM(S): 80 TABLET, FILM COATED ORAL at 21:58

## 2023-08-05 RX ADMIN — TIOTROPIUM BROMIDE 2 PUFF(S): 18 CAPSULE ORAL; RESPIRATORY (INHALATION) at 09:09

## 2023-08-05 RX ADMIN — Medication 50 MICROGRAM(S): at 05:51

## 2023-08-05 RX ADMIN — BUDESONIDE AND FORMOTEROL FUMARATE DIHYDRATE 2 PUFF(S): 160; 4.5 AEROSOL RESPIRATORY (INHALATION) at 21:58

## 2023-08-05 RX ADMIN — HEPARIN SODIUM 5000 UNIT(S): 5000 INJECTION INTRAVENOUS; SUBCUTANEOUS at 14:35

## 2023-08-05 RX ADMIN — HEPARIN SODIUM 5000 UNIT(S): 5000 INJECTION INTRAVENOUS; SUBCUTANEOUS at 05:51

## 2023-08-05 RX ADMIN — Medication 250 MILLIGRAM(S): at 17:45

## 2023-08-05 RX ADMIN — BUDESONIDE AND FORMOTEROL FUMARATE DIHYDRATE 2 PUFF(S): 160; 4.5 AEROSOL RESPIRATORY (INHALATION) at 09:09

## 2023-08-05 RX ADMIN — HEPARIN SODIUM 5000 UNIT(S): 5000 INJECTION INTRAVENOUS; SUBCUTANEOUS at 21:58

## 2023-08-05 RX ADMIN — Medication 250 MILLIGRAM(S): at 07:21

## 2023-08-05 NOTE — PROGRESS NOTE ADULT - SUBJECTIVE AND OBJECTIVE BOX
PROGRESS NOTE:     Patient is a 57y old  Male who presents with a chief complaint of sepsis (04 Aug 2023 11:16)      ---------------------------------------------------  Constance Do  PGY-1, Internal Medicine  Available on [employer] Teams  Pager: [phone] ([facility]), or [phone] ([facility])  ---------------------------------------------------    INTERVAL / OVERNIGHT EVENTS:  No acute events overnight.     SUBJECTIVE:  - Patient examined at bedside with no acute complaints.     BRIEF DAILY PLAN:  Status quo, vanc dosed by level    MEDICATIONS  (STANDING):  atorvastatin 40 milliGRAM(s) Oral at bedtime  budesonide  80 MICROgram(s)/formoterol 4.5 MICROgram(s) Inhaler 2 Puff(s) Inhalation two times a day  dextrose 5%. 1000 milliLiter(s) (50 mL/Hr) IV Continuous <Continuous>  dextrose 50% Injectable 25 Gram(s) IV Push once  glucagon  Injectable 1 milliGRAM(s) IntraMuscular once  heparin   Injectable 5000 Unit(s) SubCutaneous every 8 hours  immune   globulin 10% (GAMMAGARD) IVPB 70 Gram(s) IV Intermittent once  insulin lispro (ADMELOG) corrective regimen sliding scale   SubCutaneous three times a day before meals  insulin lispro (ADMELOG) corrective regimen sliding scale   SubCutaneous at bedtime  levothyroxine 50 MICROGram(s) Oral daily  sodium chloride 0.9%. 1000 milliLiter(s) (100 mL/Hr) IV Continuous <Continuous>  tiotropium 2.5 MICROgram(s) Inhaler 2 Puff(s) Inhalation daily  vancomycin  IVPB 1750 milliGRAM(s) IV Intermittent every 12 hours    MEDICATIONS  (PRN):  acetaminophen     Tablet .. 650 milliGRAM(s) Oral every 6 hours PRN Temp greater or equal to 38C (100.4F), Mild Pain (1 - 3)  dextrose Oral Gel 15 Gram(s) Oral once PRN Blood Glucose LESS THAN 70 milliGRAM(s)/deciliter  lidocaine   4% Patch 1 Patch Transdermal once PRN moderate pain      CAPILLARY BLOOD GLUCOSE      POCT Blood Glucose.: 140 mg/dL (04 Aug 2023 22:42)  POCT Blood Glucose.: 95 mg/dL (04 Aug 2023 17:44)  POCT Blood Glucose.: 93 mg/dL (04 Aug 2023 12:17)  POCT Blood Glucose.: 114 mg/dL (04 Aug 2023 08:25)    I&O's Summary      VITALS:   T(C): 36.9 (08-05-23 @ 05:12), Max: 36.9 (08-05-23 @ 05:12)  HR: 65 (08-05-23 @ 05:12) (59 - 65)  BP: 117/65 (08-05-23 @ 05:12) (106/59 - 155/86)  RR: 18 (08-05-23 @ 05:12) (18 - 18)  SpO2: 99% (08-05-23 @ 05:12) (99% - 100%)    GENERAL: NAD, lying in bed comfortably  HEAD:  Atraumatic, normocephalic  EYES: EOMI, PERRLA, conjunctiva and sclera clear  ENT: Moist mucous membranes  NECK: Supple, no JVD  HEART: Regular rate and rhythm, no murmurs, rubs, or gallops  LUNGS: Unlabored respirations.  Clear to auscultation bilaterally, no crackles, wheezing, or rhonchi  ABDOMEN: Soft, nontender, nondistended, +BS  EXTREMITIES: 2+ peripheral pulses bilaterally. No clubbing, cyanosis, or edema  NERVOUS SYSTEM:  A&Ox3, no focal deficits   SKIN: No rashes or lesions    LABS:                        12.1   6.56  )-----------( 272      ( 05 Aug 2023 05:40 )             36.9     08-05    132<L>  |  97<L>  |  13  ----------------------------<  96  4.4   |  24  |  0.89    Ca    9.5      05 Aug 2023 05:40  Phos  5.3     08-05  Mg     1.90     08-05    TPro  6.2  /  Alb  3.4  /  TBili  <0.2  /  DBili  x   /  AST  21  /  ALT  33  /  AlkPhos  147<H>  08-05          Urinalysis Basic - ( 05 Aug 2023 05:40 )    Color: x / Appearance: x / SG: x / pH: x  Gluc: 96 mg/dL / Ketone: x  / Bili: x / Urobili: x   Blood: x / Protein: x / Nitrite: x   Leuk Esterase: x / RBC: x / WBC x   Sq Epi: x / Non Sq Epi: x / Bacteria: x        Culture - Abscess with Gram Stain (collected 03 Aug 2023 19:00)  Source: .Abscess Buttock  Preliminary Report (04 Aug 2023 20:17):    Numerous Staphylococcus aureus        RADIOLOGY & ADDITIONAL TESTS:  Results Reviewed:   Imaging Personally Reviewed:  Electrocardiogram Personally Reviewed:    COORDINATION OF CARE:  Care Discussed with Consultants/Other Providers [Y/N]:  Prior or Outpatient Records Reviewed [Y/N]:     PROGRESS NOTE:     Patient is a 57y old  Male who presents with a chief complaint of sepsis (04 Aug 2023 11:16)      ---------------------------------------------------  Constance Do  PGY-1, Internal Medicine  Available on [employer] Teams  Pager: [phone] ([facility]), or [phone] ([facility])  ---------------------------------------------------    INTERVAL / OVERNIGHT EVENTS:  No acute events overnight.     SUBJECTIVE:  - Patient examined at bedside with no acute complaints.     BRIEF DAILY PLAN:  Status quo, vanc dosed by level    MEDICATIONS  (STANDING):  atorvastatin 40 milliGRAM(s) Oral at bedtime  budesonide  80 MICROgram(s)/formoterol 4.5 MICROgram(s) Inhaler 2 Puff(s) Inhalation two times a day  dextrose 5%. 1000 milliLiter(s) (50 mL/Hr) IV Continuous <Continuous>  dextrose 50% Injectable 25 Gram(s) IV Push once  glucagon  Injectable 1 milliGRAM(s) IntraMuscular once  heparin   Injectable 5000 Unit(s) SubCutaneous every 8 hours  immune   globulin 10% (GAMMAGARD) IVPB 70 Gram(s) IV Intermittent once  insulin lispro (ADMELOG) corrective regimen sliding scale   SubCutaneous three times a day before meals  insulin lispro (ADMELOG) corrective regimen sliding scale   SubCutaneous at bedtime  levothyroxine 50 MICROGram(s) Oral daily  sodium chloride 0.9%. 1000 milliLiter(s) (100 mL/Hr) IV Continuous <Continuous>  tiotropium 2.5 MICROgram(s) Inhaler 2 Puff(s) Inhalation daily  vancomycin  IVPB 1750 milliGRAM(s) IV Intermittent every 12 hours    MEDICATIONS  (PRN):  acetaminophen     Tablet .. 650 milliGRAM(s) Oral every 6 hours PRN Temp greater or equal to 38C (100.4F), Mild Pain (1 - 3)  dextrose Oral Gel 15 Gram(s) Oral once PRN Blood Glucose LESS THAN 70 milliGRAM(s)/deciliter  lidocaine   4% Patch 1 Patch Transdermal once PRN moderate pain      CAPILLARY BLOOD GLUCOSE      POCT Blood Glucose.: 140 mg/dL (04 Aug 2023 22:42)  POCT Blood Glucose.: 95 mg/dL (04 Aug 2023 17:44)  POCT Blood Glucose.: 93 mg/dL (04 Aug 2023 12:17)  POCT Blood Glucose.: 114 mg/dL (04 Aug 2023 08:25)    I&O's Summary      VITALS:   T(C): 36.9 (08-05-23 @ 05:12), Max: 36.9 (08-05-23 @ 05:12)  HR: 65 (08-05-23 @ 05:12) (59 - 65)  BP: 117/65 (08-05-23 @ 05:12) (106/59 - 155/86)  RR: 18 (08-05-23 @ 05:12) (18 - 18)  SpO2: 99% (08-05-23 @ 05:12) (99% - 100%)    GENERAL: NAD, well-developed  HEAD:  Atraumatic, Normocephalic  EYES: conjunctiva and sclera clear  NECK: Supple  CHEST/LUNG: Clear to auscultation bilaterally; No wheeze  HEART: Regular rate and rhythm; No murmurs, rubs, or gallops  ABDOMEN: Soft, Nontender, Nondistended; Bowel sounds present  EXTREMITIES:  No cyanosis or edema  PSYCH: AAOx3  NEUROLOGY: non-focal  SKIN: Copious sanguinous drainage staining dressing taped over gluteal wound. Notable reduced erythema/edema on the R gluteal region.      LABS:                        12.1   6.56  )-----------( 272      ( 05 Aug 2023 05:40 )             36.9     08-05    132<L>  |  97<L>  |  13  ----------------------------<  96  4.4   |  24  |  0.89    Ca    9.5      05 Aug 2023 05:40  Phos  5.3     08-05  Mg     1.90     08-05    TPro  6.2  /  Alb  3.4  /  TBili  <0.2  /  DBili  x   /  AST  21  /  ALT  33  /  AlkPhos  147<H>  08-05          Urinalysis Basic - ( 05 Aug 2023 05:40 )    Color: x / Appearance: x / SG: x / pH: x  Gluc: 96 mg/dL / Ketone: x  / Bili: x / Urobili: x   Blood: x / Protein: x / Nitrite: x   Leuk Esterase: x / RBC: x / WBC x   Sq Epi: x / Non Sq Epi: x / Bacteria: x        Culture - Abscess with Gram Stain (collected 03 Aug 2023 19:00)  Source: .Abscess Buttock  Preliminary Report (04 Aug 2023 20:17):    Numerous Staphylococcus aureus       PROGRESS NOTE:     Patient is a 57y old  Male who presents with a chief complaint of sepsis (04 Aug 2023 11:16)      ---------------------------------------------------  Constance Do  PGY-1, Internal Medicine  Available on [employer] Teams  Pager: [phone] ([facility]), or [phone] ([facility])  ---------------------------------------------------    INTERVAL / OVERNIGHT EVENTS:  No acute events overnight.     SUBJECTIVE:  Patient examined at bedside with no acute complaints.     BRIEF DAILY PLAN:  Status quo, continue vancomycin    MEDICATIONS  (STANDING):  atorvastatin 40 milliGRAM(s) Oral at bedtime  budesonide  80 MICROgram(s)/formoterol 4.5 MICROgram(s) Inhaler 2 Puff(s) Inhalation two times a day  dextrose 5%. 1000 milliLiter(s) (50 mL/Hr) IV Continuous <Continuous>  dextrose 50% Injectable 25 Gram(s) IV Push once  glucagon  Injectable 1 milliGRAM(s) IntraMuscular once  heparin   Injectable 5000 Unit(s) SubCutaneous every 8 hours  immune   globulin 10% (GAMMAGARD) IVPB 70 Gram(s) IV Intermittent once  insulin lispro (ADMELOG) corrective regimen sliding scale   SubCutaneous three times a day before meals  insulin lispro (ADMELOG) corrective regimen sliding scale   SubCutaneous at bedtime  levothyroxine 50 MICROGram(s) Oral daily  sodium chloride 0.9%. 1000 milliLiter(s) (100 mL/Hr) IV Continuous <Continuous>  tiotropium 2.5 MICROgram(s) Inhaler 2 Puff(s) Inhalation daily  vancomycin  IVPB 1750 milliGRAM(s) IV Intermittent every 12 hours    MEDICATIONS  (PRN):  acetaminophen     Tablet .. 650 milliGRAM(s) Oral every 6 hours PRN Temp greater or equal to 38C (100.4F), Mild Pain (1 - 3)  dextrose Oral Gel 15 Gram(s) Oral once PRN Blood Glucose LESS THAN 70 milliGRAM(s)/deciliter  lidocaine   4% Patch 1 Patch Transdermal once PRN moderate pain      CAPILLARY BLOOD GLUCOSE      POCT Blood Glucose.: 140 mg/dL (04 Aug 2023 22:42)  POCT Blood Glucose.: 95 mg/dL (04 Aug 2023 17:44)  POCT Blood Glucose.: 93 mg/dL (04 Aug 2023 12:17)  POCT Blood Glucose.: 114 mg/dL (04 Aug 2023 08:25)    I&O's Summary      VITALS:   T(C): 36.9 (08-05-23 @ 05:12), Max: 36.9 (08-05-23 @ 05:12)  HR: 65 (08-05-23 @ 05:12) (59 - 65)  BP: 117/65 (08-05-23 @ 05:12) (106/59 - 155/86)  RR: 18 (08-05-23 @ 05:12) (18 - 18)  SpO2: 99% (08-05-23 @ 05:12) (99% - 100%)    GENERAL: NAD, well-developed  HEAD:  Atraumatic, Normocephalic  EYES: conjunctiva and sclera clear  NECK: Supple  CHEST/LUNG: Clear to auscultation bilaterally; No wheeze  HEART: Regular rate and rhythm; No murmurs, rubs, or gallops  ABDOMEN: Soft, Nontender, Nondistended; Bowel sounds present  EXTREMITIES:  No cyanosis or edema  PSYCH: AAOx3  NEUROLOGY: non-focal  SKIN: Mild erythema/edema on the R gluteal region, no drainage noted over dressing      LABS:                        12.1   6.56  )-----------( 272      ( 05 Aug 2023 05:40 )             36.9     08-05    132<L>  |  97<L>  |  13  ----------------------------<  96  4.4   |  24  |  0.89    Ca    9.5      05 Aug 2023 05:40  Phos  5.3     08-05  Mg     1.90     08-05    TPro  6.2  /  Alb  3.4  /  TBili  <0.2  /  DBili  x   /  AST  21  /  ALT  33  /  AlkPhos  147<H>  08-05          Urinalysis Basic - ( 05 Aug 2023 05:40 )    Color: x / Appearance: x / SG: x / pH: x  Gluc: 96 mg/dL / Ketone: x  / Bili: x / Urobili: x   Blood: x / Protein: x / Nitrite: x   Leuk Esterase: x / RBC: x / WBC x   Sq Epi: x / Non Sq Epi: x / Bacteria: x        Culture - Abscess with Gram Stain (collected 03 Aug 2023 19:00)  Source: .Abscess Buttock  Preliminary Report (04 Aug 2023 20:17):    Numerous Staphylococcus aureus

## 2023-08-05 NOTE — PROGRESS NOTE ADULT - ATTENDING COMMENTS
Chart reviewed. Vitals and Labs noted.   Pt seen and examined at bedside. Plan formulated with the resident. Detail H&P as above.     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  Also found to have TASH which has responded to IVF hydration.    Pain is overall improving. stable on PRN Tylenol.  Blood cxs 7/29/23 so far are negative.  Vanco per ID. monitor trough.   Monitoring rt gluteal region for any increased drainage, dark discoloration, and/or increased fluctuance.  US shows right buttock heterogeneous collection measuring 4.3 x 3.8 x 2.8 cm suspicious for a abscess and phlegmon.  Appreciate surgery, wound care, now with spontaneous drainage/ popped per the pt.    SURGERY/wound care follow up.     Hx of CVID on monthly Gammagard.  OP Immunologist: Dr. Mitchell Boxer ([facility])  Last Gammagard 7/6/23. Due 8/3, the infusion ordered. but pt been refusing stating he wants SD formula.   States he is agreeable if the sister agrees.  (Called pt's sister Brittany, went straight to voicemail). will reattempt.   c/w qmonthly infusions after discharge.    ID f/u in regards to final abx regimen.     - Dr. JAKE Morales ([facility])  - [phone]

## 2023-08-05 NOTE — PROGRESS NOTE ADULT - PROBLEM SELECTOR PLAN 1
Patient presenting with worsening buttock cellulitis; patient has presented with buttock SSTI 4x in the last 4 months. CT abd/pelvis w/o evidence of any drainable collection or subcutaneous gas. S/p vanc and cefepime in ED, now on vancomycin. ID and wound care following, appreciate recs. Vanc trough 10.2 on 7/31 and 11.8 on repeat, will maintain current dose per ID. BCx neg at 72h. Ultrasound concerning for phlegmon and abscess. Now s/p gen surg I&D with improved pain/erythema/swelling but persisting sanguinous drainage.  - continue IV vanc 1750mg q12h (first dose 7/29)  - wound care following, appreciate recs  - gen surg following, appreciate recs: f/u outpatient with Dr. Diaz within 1 week after discharge Patient presenting with worsening buttock cellulitis; patient has presented with buttock SSTI 4x in the last 4 months. CT abd/pelvis w/o evidence of any drainable collection or subcutaneous gas. S/p vanc and cefepime in ED, now on vancomycin. ID and wound care following, appreciate recs. Vanc trough 10.2 on 7/31 and 11.8 on repeat, will maintain current dose per ID. BCx neg at 72h. Ultrasound concerning for phlegmon and abscess. Now s/p gen surg I&D with improved pain/erythema/swelling but persisting sanguinous drainage.  - continue IV vanc 1750mg q12h  - wound care following, appreciate recs  - gen surg following, appreciate recs: f/u outpatient with Dr. Diaz within 1 week after discharge Patient presenting with worsening buttock cellulitis; patient has presented with buttock SSTI 4x in the last 4 months. CT abd/pelvis w/o evidence of any drainable collection or subcutaneous gas. S/p vanc and cefepime in ED, now on vancomycin. ID and wound care following, appreciate recs. Vanc trough 10.2 on 7/31 and 11.8 on repeat, will maintain current dose per ID. BCx neg at 72h. Ultrasound concerning for phlegmon and abscess. Now s/p gen surg I&D with improved pain/erythema/swelling but persisting sanguinous drainage.  - continue IV vanc 1750mg q12h, repeat level in AM if > 20 reduce dose  - wound care following, appreciate recs  - gen surg following, appreciate recs: f/u outpatient with Dr. Diaz within 1 week after discharge

## 2023-08-05 NOTE — PROGRESS NOTE ADULT - PROBLEM SELECTOR PLAN 2
Patient was scheduled for outpatient IVIG infusion on 8/3  - 1x gammagard 70mg IV ordered to receive while inpatient

## 2023-08-06 LAB
ALBUMIN SERPL ELPH-MCNC: 3.6 G/DL — SIGNIFICANT CHANGE UP (ref 3.3–5)
ALP SERPL-CCNC: 142 U/L — HIGH (ref 40–120)
ALT FLD-CCNC: 33 U/L — SIGNIFICANT CHANGE UP (ref 4–41)
ANION GAP SERPL CALC-SCNC: 16 MMOL/L — HIGH (ref 7–14)
AST SERPL-CCNC: 17 U/L — SIGNIFICANT CHANGE UP (ref 4–40)
BILIRUB SERPL-MCNC: <0.2 MG/DL — SIGNIFICANT CHANGE UP (ref 0.2–1.2)
BUN SERPL-MCNC: 17 MG/DL — SIGNIFICANT CHANGE UP (ref 7–23)
CALCIUM SERPL-MCNC: 9.8 MG/DL — SIGNIFICANT CHANGE UP (ref 8.4–10.5)
CHLORIDE SERPL-SCNC: 97 MMOL/L — LOW (ref 98–107)
CO2 SERPL-SCNC: 19 MMOL/L — LOW (ref 22–31)
CREAT SERPL-MCNC: 1.04 MG/DL — SIGNIFICANT CHANGE UP (ref 0.5–1.3)
EGFR: 85 ML/MIN/1.73M2 — SIGNIFICANT CHANGE UP
GLUCOSE BLDC GLUCOMTR-MCNC: 103 MG/DL — HIGH (ref 70–99)
GLUCOSE BLDC GLUCOMTR-MCNC: 122 MG/DL — HIGH (ref 70–99)
GLUCOSE BLDC GLUCOMTR-MCNC: 124 MG/DL — HIGH (ref 70–99)
GLUCOSE BLDC GLUCOMTR-MCNC: 91 MG/DL — SIGNIFICANT CHANGE UP (ref 70–99)
GLUCOSE SERPL-MCNC: 95 MG/DL — SIGNIFICANT CHANGE UP (ref 70–99)
HCT VFR BLD CALC: 36.7 % — LOW (ref 39–50)
HGB BLD-MCNC: 12 G/DL — LOW (ref 13–17)
MAGNESIUM SERPL-MCNC: 2 MG/DL — SIGNIFICANT CHANGE UP (ref 1.6–2.6)
MCHC RBC-ENTMCNC: 26.9 PG — LOW (ref 27–34)
MCHC RBC-ENTMCNC: 32.7 GM/DL — SIGNIFICANT CHANGE UP (ref 32–36)
MCV RBC AUTO: 82.3 FL — SIGNIFICANT CHANGE UP (ref 80–100)
NRBC # BLD: 0 /100 WBCS — SIGNIFICANT CHANGE UP (ref 0–0)
NRBC # FLD: 0 K/UL — SIGNIFICANT CHANGE UP (ref 0–0)
PHOSPHATE SERPL-MCNC: 4.4 MG/DL — SIGNIFICANT CHANGE UP (ref 2.5–4.5)
PLATELET # BLD AUTO: 279 K/UL — SIGNIFICANT CHANGE UP (ref 150–400)
POTASSIUM SERPL-MCNC: 4 MMOL/L — SIGNIFICANT CHANGE UP (ref 3.5–5.3)
POTASSIUM SERPL-SCNC: 4 MMOL/L — SIGNIFICANT CHANGE UP (ref 3.5–5.3)
PROT SERPL-MCNC: 6.5 G/DL — SIGNIFICANT CHANGE UP (ref 6–8.3)
RBC # BLD: 4.46 M/UL — SIGNIFICANT CHANGE UP (ref 4.2–5.8)
RBC # FLD: 15.1 % — HIGH (ref 10.3–14.5)
SODIUM SERPL-SCNC: 132 MMOL/L — LOW (ref 135–145)
VANCOMYCIN TROUGH SERPL-MCNC: 19.8 UG/ML — SIGNIFICANT CHANGE UP (ref 10–20)
WBC # BLD: 7.78 K/UL — SIGNIFICANT CHANGE UP (ref 3.8–10.5)
WBC # FLD AUTO: 7.78 K/UL — SIGNIFICANT CHANGE UP (ref 3.8–10.5)

## 2023-08-06 RX ORDER — VANCOMYCIN HCL 1 G
1750 VIAL (EA) INTRAVENOUS EVERY 12 HOURS
Refills: 0 | Status: DISCONTINUED | OUTPATIENT
Start: 2023-08-06 | End: 2023-08-07

## 2023-08-06 RX ADMIN — BUDESONIDE AND FORMOTEROL FUMARATE DIHYDRATE 2 PUFF(S): 160; 4.5 AEROSOL RESPIRATORY (INHALATION) at 21:46

## 2023-08-06 RX ADMIN — Medication 50 MICROGRAM(S): at 05:51

## 2023-08-06 RX ADMIN — Medication 250 MILLIGRAM(S): at 21:46

## 2023-08-06 RX ADMIN — Medication 250 MILLIGRAM(S): at 10:23

## 2023-08-06 RX ADMIN — TIOTROPIUM BROMIDE 2 PUFF(S): 18 CAPSULE ORAL; RESPIRATORY (INHALATION) at 08:54

## 2023-08-06 RX ADMIN — BUDESONIDE AND FORMOTEROL FUMARATE DIHYDRATE 2 PUFF(S): 160; 4.5 AEROSOL RESPIRATORY (INHALATION) at 08:53

## 2023-08-06 RX ADMIN — ATORVASTATIN CALCIUM 40 MILLIGRAM(S): 80 TABLET, FILM COATED ORAL at 21:46

## 2023-08-06 RX ADMIN — HEPARIN SODIUM 5000 UNIT(S): 5000 INJECTION INTRAVENOUS; SUBCUTANEOUS at 21:46

## 2023-08-06 RX ADMIN — HEPARIN SODIUM 5000 UNIT(S): 5000 INJECTION INTRAVENOUS; SUBCUTANEOUS at 05:51

## 2023-08-06 RX ADMIN — HEPARIN SODIUM 5000 UNIT(S): 5000 INJECTION INTRAVENOUS; SUBCUTANEOUS at 14:18

## 2023-08-06 NOTE — PROGRESS NOTE ADULT - PROBLEM SELECTOR PLAN 1
Patient presenting with worsening buttock cellulitis; patient has presented with buttock SSTI 4x in the last 4 months. CT abd/pelvis w/o evidence of any drainable collection or subcutaneous gas. S/p vanc and cefepime in ED, now on vancomycin. ID and wound care following, appreciate recs. Vanc trough 10.2 on 7/31 and 11.8 on repeat, will maintain current dose per ID. BCx neg at 72h. Ultrasound concerning for phlegmon and abscess. Now s/p gen surg I&D with improved pain/erythema/swelling but persisting sanguinous drainage.  - continue IV vanc 1750mg q12h, repeat level in AM if > 20 reduce dose  - wound care following, appreciate recs  - gen surg following, appreciate recs: f/u outpatient with Dr. Diaz within 1 week after discharge Patient presenting with worsening buttock cellulitis; patient has presented with buttock SSTI 4x in the last 4 months. CT abd/pelvis w/o evidence of any drainable collection or subcutaneous gas. S/p vanc and cefepime in ED, now on vancomycin. ID and wound care following, appreciate recs. Vanc trough 10.2 on 7/31 and 11.8 on repeat, will maintain current dose per ID. BCx neg at 72h. Ultrasound concerning for phlegmon and abscess. Now s/p gen surg I&D with improved pain/erythema/swelling. Vanc trough 8/6 AM 19.8.   - ID following, appreciate recs: continue IV vanc 1750mg q12h  - wound care following, appreciate recs  - gen surg following, appreciate recs: f/u outpatient with Dr. Diaz within 1 week after discharge

## 2023-08-06 NOTE — PROGRESS NOTE ADULT - SUBJECTIVE AND OBJECTIVE BOX
DATE OF SERVICE: 08-06-23 @ 07:39    Patient is a 57y old  Male who presents with a chief complaint of sepsis (05 Aug 2023 07:36)      SUBJECTIVE / OVERNIGHT EVENTS:    MEDICATIONS  (STANDING):  atorvastatin 40 milliGRAM(s) Oral at bedtime  budesonide  80 MICROgram(s)/formoterol 4.5 MICROgram(s) Inhaler 2 Puff(s) Inhalation two times a day  dextrose 5%. 1000 milliLiter(s) (50 mL/Hr) IV Continuous <Continuous>  dextrose 50% Injectable 25 Gram(s) IV Push once  glucagon  Injectable 1 milliGRAM(s) IntraMuscular once  heparin   Injectable 5000 Unit(s) SubCutaneous every 8 hours  immune   globulin 10% (GAMMAGARD) IVPB 70 Gram(s) IV Intermittent once  insulin lispro (ADMELOG) corrective regimen sliding scale   SubCutaneous three times a day before meals  insulin lispro (ADMELOG) corrective regimen sliding scale   SubCutaneous at bedtime  levothyroxine 50 MICROGram(s) Oral daily  sodium chloride 0.9%. 1000 milliLiter(s) (100 mL/Hr) IV Continuous <Continuous>  tiotropium 2.5 MICROgram(s) Inhaler 2 Puff(s) Inhalation daily    MEDICATIONS  (PRN):  acetaminophen     Tablet .. 650 milliGRAM(s) Oral every 6 hours PRN Temp greater or equal to 38C (100.4F), Mild Pain (1 - 3)  dextrose Oral Gel 15 Gram(s) Oral once PRN Blood Glucose LESS THAN 70 milliGRAM(s)/deciliter  lidocaine   4% Patch 1 Patch Transdermal once PRN moderate pain      Vital Signs Last 24 Hrs  T(C): 36.6 (06 Aug 2023 05:50), Max: 36.6 (06 Aug 2023 05:50)  T(F): 97.9 (06 Aug 2023 05:50), Max: 97.9 (06 Aug 2023 05:50)  HR: 61 (06 Aug 2023 05:50) (60 - 97)  BP: 139/66 (06 Aug 2023 05:50) (117/65 - 139/66)  BP(mean): --  RR: 18 (06 Aug 2023 05:50) (17 - 20)  SpO2: 100% (06 Aug 2023 05:50) (99% - 100%)    Parameters below as of 06 Aug 2023 05:50  Patient On (Oxygen Delivery Method): room air      CAPILLARY BLOOD GLUCOSE      POCT Blood Glucose.: 92 mg/dL (05 Aug 2023 21:56)  POCT Blood Glucose.: 119 mg/dL (05 Aug 2023 17:51)  POCT Blood Glucose.: 118 mg/dL (05 Aug 2023 12:36)  POCT Blood Glucose.: 128 mg/dL (05 Aug 2023 08:33)    I&O's Summary      PHYSICAL EXAM:  GENERAL: NAD, well-developed  HEAD:  Atraumatic, Normocephalic  EYES: EOMI, PERRLA, conjunctiva and sclera clear  NECK: Supple, No JVD  CHEST/LUNG: Clear to auscultation bilaterally; No wheeze  HEART: Regular rate and rhythm; No murmurs, rubs, or gallops  ABDOMEN: Soft, Nontender, Nondistended; Bowel sounds present  EXTREMITIES:  2+ Peripheral Pulses, No clubbing, cyanosis, or edema  PSYCH: AAOx3  NEUROLOGY: non-focal  SKIN: No rashes or lesions    LABS:        RADIOLOGY & ADDITIONAL TESTS:    Imaging Personally Reviewed:    Consultant(s) Notes Reviewed:      Care Discussed with Consultants/Other Providers:   DATE OF SERVICE: 08-06-23 @ 07:39    Patient is a 57y old  Male who presents with a chief complaint of sepsis (05 Aug 2023 07:36)      SUBJECTIVE / OVERNIGHT EVENTS:  Wound culture returned positive for MRSA this AM  Patient reports significant symptomatic improvement. Gluteal pain is 0/10 when standing and 6/10 when sitting directly on wound.  Denies any other sx including n/v  Last BM yesterday morning, no blood in stool      MEDICATIONS  (STANDING):  atorvastatin 40 milliGRAM(s) Oral at bedtime  budesonide  80 MICROgram(s)/formoterol 4.5 MICROgram(s) Inhaler 2 Puff(s) Inhalation two times a day  dextrose 5%. 1000 milliLiter(s) (50 mL/Hr) IV Continuous <Continuous>  dextrose 50% Injectable 25 Gram(s) IV Push once  glucagon  Injectable 1 milliGRAM(s) IntraMuscular once  heparin   Injectable 5000 Unit(s) SubCutaneous every 8 hours  immune   globulin 10% (GAMMAGARD) IVPB 70 Gram(s) IV Intermittent once  insulin lispro (ADMELOG) corrective regimen sliding scale   SubCutaneous three times a day before meals  insulin lispro (ADMELOG) corrective regimen sliding scale   SubCutaneous at bedtime  levothyroxine 50 MICROGram(s) Oral daily  sodium chloride 0.9%. 1000 milliLiter(s) (100 mL/Hr) IV Continuous <Continuous>  tiotropium 2.5 MICROgram(s) Inhaler 2 Puff(s) Inhalation daily    MEDICATIONS  (PRN):  acetaminophen     Tablet .. 650 milliGRAM(s) Oral every 6 hours PRN Temp greater or equal to 38C (100.4F), Mild Pain (1 - 3)  dextrose Oral Gel 15 Gram(s) Oral once PRN Blood Glucose LESS THAN 70 milliGRAM(s)/deciliter  lidocaine   4% Patch 1 Patch Transdermal once PRN moderate pain      Vital Signs Last 24 Hrs  T(C): 36.6 (06 Aug 2023 05:50), Max: 36.6 (06 Aug 2023 05:50)  T(F): 97.9 (06 Aug 2023 05:50), Max: 97.9 (06 Aug 2023 05:50)  HR: 61 (06 Aug 2023 05:50) (60 - 97)  BP: 139/66 (06 Aug 2023 05:50) (117/65 - 139/66)  BP(mean): --  RR: 18 (06 Aug 2023 05:50) (17 - 20)  SpO2: 100% (06 Aug 2023 05:50) (99% - 100%)    Parameters below as of 06 Aug 2023 05:50  Patient On (Oxygen Delivery Method): room air      CAPILLARY BLOOD GLUCOSE  POCT Blood Glucose.: 92 mg/dL (05 Aug 2023 21:56)  POCT Blood Glucose.: 119 mg/dL (05 Aug 2023 17:51)  POCT Blood Glucose.: 118 mg/dL (05 Aug 2023 12:36)  POCT Blood Glucose.: 128 mg/dL (05 Aug 2023 08:33)        PHYSICAL EXAM:  GENERAL: NAD, well-developed  HEAD:  Atraumatic, Normocephalic  EYES: conjunctiva and sclera clear  NECK: Supple  CHEST/LUNG: Clear to auscultation bilaterally; No wheeze  HEART: Regular rate and rhythm; No murmurs, rubs, or gallops  ABDOMEN: Soft, Nontender, Nondistended; Bowel sounds present  EXTREMITIES:  No cyanosis or edema  PSYCH: AAOx3  NEUROLOGY: non-focal  SKIN: Gluteal wound is covered by clean bandage with no evidence of surrounding erythema/edema or copious drainage.      LABS:                        12.0   7.78  )-----------( 279      ( 06 Aug 2023 05:40 )             36.7     08-06    132<L>  |  97<L>  |  17  ----------------------------<  95  4.0   |  19<L>  |  1.04    Ca    9.8      06 Aug 2023 05:40  Phos  4.4     08-06  Mg     2.00     08-06    TPro  6.5  /  Alb  3.6  /  TBili  <0.2  /  DBili  x   /  AST  17  /  ALT  33  /  AlkPhos  142<H>  08-06      Urinalysis Basic - ( 06 Aug 2023 05:40 )  Color: x / Appearance: x / SG: x / pH: x  Gluc: 95 mg/dL / Ketone: x  / Bili: x / Urobili: x   Blood: x / Protein: x / Nitrite: x   Leuk Esterase: x / RBC: x / WBC x   Sq Epi: x / Non Sq Epi: x / Bacteria: x          RADIOLOGY & ADDITIONAL TESTS: no new imaging    Imaging Personally Reviewed: n/a  Consultant(s) Notes Reviewed:  yes  Care Discussed with Consultants/Other Providers: yes

## 2023-08-06 NOTE — PROGRESS NOTE ADULT - PROBLEM SELECTOR PLAN 2
Patient was scheduled for outpatient IVIG infusion on 8/3. Gammagard 70mg IV ordered, but patient reports he must take gammagard SD with benadryl pretreatment. Per pharmacy, do not carry this product.  - f/u OP for gammagard SD infusion

## 2023-08-06 NOTE — PROGRESS NOTE ADULT - ATTENDING COMMENTS
Chart reviewed. Vitals and Labs noted.   Pt seen and examined at bedside. Plan formulated with the resident. Detail H&P as above.     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  Also found to have TASH which has responded to IVF hydration.    Pain is overall improving. stable on PRN Tylenol.  Blood cxs 7/29/23 so far are negative.  Vanco per ID. monitor trough.   Monitoring rt gluteal region for any increased drainage, dark discoloration, and/or increased fluctuance.  US shows right buttock heterogeneous collection measuring 4.3 x 3.8 x 2.8 cm suspicious for a abscess and phlegmon.  Appreciate surgery, wound care, now with spontaneous drainage/ popped per the pt.    SURGERY/wound care follow up.     Hx of CVID on monthly Gammagard.  OP Immunologist: Dr. Mitchell Boxer ([facility])  Last Gammagard 7/6/23. Due 8/3, the infusion ordered. but pt been refusing stating he wants SD formula.   States he is agreeable if the sister agrees.  (Called pt's sister Brittany, went straight to voicemail). Reattempted but not picking up. voicemail full.  c/w qmonthly infusions after discharge.    ID f/u in regards to final abx regimen.     - Dr. JAKE Morales ([facility])  - [phone]

## 2023-08-06 NOTE — PROGRESS NOTE ADULT - PROBLEM SELECTOR PLAN 3
Patient with hx of chronic hyponatremia. Na 124 previously due to polydipsia. Also recently on bactrim, now discontinued. S/p 1 L NS in ED. Na 132 on 8/1 and patient reports poor PO intake over the past week. Uosm 342, Farrukh <20.  - Continue to monitor Patient with hx of chronic hyponatremia. Na 124 previously due to polydipsia. Also recently on bactrim, now discontinued. S/p 1 L NS in ED. Patient reports poor PO intake over the past week. Uosm 342, Farrukh <20. Patient Na stable in 130s 7/31-8/6.  - Continue to monitor

## 2023-08-06 NOTE — PROGRESS NOTE ADULT - ASSESSMENT
Mr. Cristina is a 57y M with PMH Schizoaffective D/O (Bipolar Type), HTN, HLD, Hypothyroidism, Daily Smoker, CVID/ Hypogammaglobulinemia (monthly IVIG - last 7/6/2023), T2DM, COPD, recent Lt gluteal MRSA abscess/cellulitis w/ MRSA bacteremia s/p Debridement 6/2023 presenting with worsening right buttock cellulitis. Pt found to be septic likely 2/2 buttock cellulitis. Pt admitted for further management.    Mr. Cristina is a 57y M with PMH Schizoaffective D/O (Bipolar Type), HTN, HLD, Hypothyroidism, Daily Smoker, CVID/ Hypogammaglobulinemia (monthly IVIG - last 7/6/2023), T2DM, COPD, recent Lt gluteal MRSA abscess/cellulitis w/ MRSA bacteremia s/p Debridement 6/2023 presenting with worsening right buttock cellulitis. Pt found to be septic likely 2/2 buttock cellulitis. Pt admitted for further management, now s/p surgery I&D

## 2023-08-07 LAB
ALBUMIN SERPL ELPH-MCNC: 3.9 G/DL — SIGNIFICANT CHANGE UP (ref 3.3–5)
ALP SERPL-CCNC: 139 U/L — HIGH (ref 40–120)
ALT FLD-CCNC: 29 U/L — SIGNIFICANT CHANGE UP (ref 4–41)
ANION GAP SERPL CALC-SCNC: 11 MMOL/L — SIGNIFICANT CHANGE UP (ref 7–14)
AST SERPL-CCNC: 17 U/L — SIGNIFICANT CHANGE UP (ref 4–40)
BILIRUB SERPL-MCNC: <0.2 MG/DL — SIGNIFICANT CHANGE UP (ref 0.2–1.2)
BUN SERPL-MCNC: 18 MG/DL — SIGNIFICANT CHANGE UP (ref 7–23)
CALCIUM SERPL-MCNC: 9.8 MG/DL — SIGNIFICANT CHANGE UP (ref 8.4–10.5)
CHLORIDE SERPL-SCNC: 96 MMOL/L — LOW (ref 98–107)
CO2 SERPL-SCNC: 25 MMOL/L — SIGNIFICANT CHANGE UP (ref 22–31)
CREAT SERPL-MCNC: 1.23 MG/DL — SIGNIFICANT CHANGE UP (ref 0.5–1.3)
EGFR: 69 ML/MIN/1.73M2 — SIGNIFICANT CHANGE UP
GLUCOSE BLDC GLUCOMTR-MCNC: 109 MG/DL — HIGH (ref 70–99)
GLUCOSE BLDC GLUCOMTR-MCNC: 110 MG/DL — HIGH (ref 70–99)
GLUCOSE BLDC GLUCOMTR-MCNC: 110 MG/DL — HIGH (ref 70–99)
GLUCOSE BLDC GLUCOMTR-MCNC: 120 MG/DL — HIGH (ref 70–99)
GLUCOSE SERPL-MCNC: 119 MG/DL — HIGH (ref 70–99)
HCT VFR BLD CALC: 37.3 % — LOW (ref 39–50)
HGB BLD-MCNC: 12.2 G/DL — LOW (ref 13–17)
MAGNESIUM SERPL-MCNC: 2 MG/DL — SIGNIFICANT CHANGE UP (ref 1.6–2.6)
MCHC RBC-ENTMCNC: 26.7 PG — LOW (ref 27–34)
MCHC RBC-ENTMCNC: 32.7 GM/DL — SIGNIFICANT CHANGE UP (ref 32–36)
MCV RBC AUTO: 81.6 FL — SIGNIFICANT CHANGE UP (ref 80–100)
NRBC # BLD: 0 /100 WBCS — SIGNIFICANT CHANGE UP (ref 0–0)
NRBC # FLD: 0 K/UL — SIGNIFICANT CHANGE UP (ref 0–0)
PHOSPHATE SERPL-MCNC: 5.4 MG/DL — HIGH (ref 2.5–4.5)
PLATELET # BLD AUTO: 279 K/UL — SIGNIFICANT CHANGE UP (ref 150–400)
POTASSIUM SERPL-MCNC: 4.6 MMOL/L — SIGNIFICANT CHANGE UP (ref 3.5–5.3)
POTASSIUM SERPL-SCNC: 4.6 MMOL/L — SIGNIFICANT CHANGE UP (ref 3.5–5.3)
PROT SERPL-MCNC: 6.7 G/DL — SIGNIFICANT CHANGE UP (ref 6–8.3)
RBC # BLD: 4.57 M/UL — SIGNIFICANT CHANGE UP (ref 4.2–5.8)
RBC # FLD: 15.1 % — HIGH (ref 10.3–14.5)
SODIUM SERPL-SCNC: 132 MMOL/L — LOW (ref 135–145)
VANCOMYCIN TROUGH SERPL-MCNC: 21.8 UG/ML — HIGH (ref 10–20)
WBC # BLD: 8.11 K/UL — SIGNIFICANT CHANGE UP (ref 3.8–10.5)
WBC # FLD AUTO: 8.11 K/UL — SIGNIFICANT CHANGE UP (ref 3.8–10.5)

## 2023-08-07 RX ORDER — VANCOMYCIN HCL 1 G
1500 VIAL (EA) INTRAVENOUS EVERY 12 HOURS
Refills: 0 | Status: DISCONTINUED | OUTPATIENT
Start: 2023-08-07 | End: 2023-08-09

## 2023-08-07 RX ORDER — IMMUNE GLOBULIN,GAMMA(IGG) 5 %
70 VIAL (ML) INTRAVENOUS ONCE
Refills: 0 | Status: DISCONTINUED | OUTPATIENT
Start: 2023-08-09 | End: 2023-08-09

## 2023-08-07 RX ORDER — SODIUM CHLORIDE 9 MG/ML
1000 INJECTION INTRAMUSCULAR; INTRAVENOUS; SUBCUTANEOUS ONCE
Refills: 0 | Status: COMPLETED | OUTPATIENT
Start: 2023-08-07 | End: 2023-08-07

## 2023-08-07 RX ADMIN — BUDESONIDE AND FORMOTEROL FUMARATE DIHYDRATE 2 PUFF(S): 160; 4.5 AEROSOL RESPIRATORY (INHALATION) at 09:25

## 2023-08-07 RX ADMIN — Medication 50 MICROGRAM(S): at 06:08

## 2023-08-07 RX ADMIN — Medication 300 MILLIGRAM(S): at 22:37

## 2023-08-07 RX ADMIN — ATORVASTATIN CALCIUM 40 MILLIGRAM(S): 80 TABLET, FILM COATED ORAL at 22:41

## 2023-08-07 RX ADMIN — HEPARIN SODIUM 5000 UNIT(S): 5000 INJECTION INTRAVENOUS; SUBCUTANEOUS at 06:08

## 2023-08-07 RX ADMIN — HEPARIN SODIUM 5000 UNIT(S): 5000 INJECTION INTRAVENOUS; SUBCUTANEOUS at 14:38

## 2023-08-07 RX ADMIN — Medication 250 MILLIGRAM(S): at 06:08

## 2023-08-07 RX ADMIN — HEPARIN SODIUM 5000 UNIT(S): 5000 INJECTION INTRAVENOUS; SUBCUTANEOUS at 22:41

## 2023-08-07 RX ADMIN — SODIUM CHLORIDE 1000 MILLILITER(S): 9 INJECTION INTRAMUSCULAR; INTRAVENOUS; SUBCUTANEOUS at 13:27

## 2023-08-07 RX ADMIN — BUDESONIDE AND FORMOTEROL FUMARATE DIHYDRATE 2 PUFF(S): 160; 4.5 AEROSOL RESPIRATORY (INHALATION) at 22:41

## 2023-08-07 RX ADMIN — TIOTROPIUM BROMIDE 2 PUFF(S): 18 CAPSULE ORAL; RESPIRATORY (INHALATION) at 09:25

## 2023-08-07 NOTE — DIETITIAN INITIAL EVALUATION ADULT - ORAL INTAKE PTA/DIET HISTORY
Patient seen for assessment. Reports good appetite PTA. Follows a low calorie diet (1,200-1,300 kcal) that's high in protein/fiber for weight loss. Takes Metformin. A1c is 6.3% (5/8).

## 2023-08-07 NOTE — DIETITIAN INITIAL EVALUATION ADULT - PERTINENT MEDS FT
MEDICATIONS  (STANDING):  atorvastatin 40 milliGRAM(s) Oral at bedtime  budesonide  80 MICROgram(s)/formoterol 4.5 MICROgram(s) Inhaler 2 Puff(s) Inhalation two times a day  dextrose 5%. 1000 milliLiter(s) (50 mL/Hr) IV Continuous <Continuous>  dextrose 50% Injectable 25 Gram(s) IV Push once  glucagon  Injectable 1 milliGRAM(s) IntraMuscular once  heparin   Injectable 5000 Unit(s) SubCutaneous every 8 hours  insulin lispro (ADMELOG) corrective regimen sliding scale   SubCutaneous three times a day before meals  insulin lispro (ADMELOG) corrective regimen sliding scale   SubCutaneous at bedtime  levothyroxine 50 MICROGram(s) Oral daily  tiotropium 2.5 MICROgram(s) Inhaler 2 Puff(s) Inhalation daily  vancomycin  IVPB 1750 milliGRAM(s) IV Intermittent every 12 hours    MEDICATIONS  (PRN):  acetaminophen     Tablet .. 650 milliGRAM(s) Oral every 6 hours PRN Temp greater or equal to 38C (100.4F), Mild Pain (1 - 3)  dextrose Oral Gel 15 Gram(s) Oral once PRN Blood Glucose LESS THAN 70 milliGRAM(s)/deciliter  lidocaine   4% Patch 1 Patch Transdermal once PRN moderate pain

## 2023-08-07 NOTE — DIETITIAN INITIAL EVALUATION ADULT - PERTINENT LABORATORY DATA
08-07    132<L>  |  96<L>  |  18  ----------------------------<  119<H>  4.6   |  25  |  1.23    Ca    9.8      07 Aug 2023 06:50  Phos  5.4     08-07  Mg     2.00     08-07    TPro  6.7  /  Alb  3.9  /  TBili  <0.2  /  DBili  x   /  AST  17  /  ALT  29  /  AlkPhos  139<H>  08-07  POCT Blood Glucose.: 110 mg/dL (08-07-23 @ 12:43)  A1C with Estimated Average Glucose Result: 5.2 % (07-14-23 @ 23:42)  A1C with Estimated Average Glucose Result: 6.3 % (05-08-23 @ 05:55)  A1C with Estimated Average Glucose Result: 6.3 % (02-05-23 @ 10:43)

## 2023-08-07 NOTE — PROGRESS NOTE ADULT - ASSESSMENT
Mr. Cristina is a 57y M with PMH Schizoaffective D/O (Bipolar Type), HTN, HLD, Hypothyroidism, Daily Smoker, CVID/ Hypogammaglobulinemia (monthly IVIG - last 7/6/2023), T2DM, COPD, recent Lt gluteal MRSA abscess/cellulitis w/ MRSA bacteremia s/p Debridement 6/2023 presenting with worsening right buttock cellulitis. Pt found to be septic likely 2/2 buttock cellulitis. Pt admitted for further management, now s/p surgery I&D   Mr. Cristina is a 57y M with PMH Schizoaffective D/O (Bipolar Type), HTN, HLD, Hypothyroidism, Daily Smoker, CVID/ Hypogammaglobulinemia (monthly IVIG - last 7/6/2023), T2DM, COPD, recent Lt gluteal MRSA abscess/cellulitis w/ MRSA bacteremia s/p Debridement 6/2023 presenting with worsening right buttock cellulitis. Pt found to be septic likely 2/2 buttock cellulitis. Pt admitted for further management, now s/p surgery I&D. Wound cx positive for MRSA.

## 2023-08-07 NOTE — PROGRESS NOTE ADULT - SUBJECTIVE AND OBJECTIVE BOX
DATE OF SERVICE: 08-07-23 @ 07:57    Patient is a 57y old  Male who presents with a chief complaint of sepsis (06 Aug 2023 07:39)      SUBJECTIVE / OVERNIGHT EVENTS:    MEDICATIONS  (STANDING):  atorvastatin 40 milliGRAM(s) Oral at bedtime  budesonide  80 MICROgram(s)/formoterol 4.5 MICROgram(s) Inhaler 2 Puff(s) Inhalation two times a day  dextrose 5%. 1000 milliLiter(s) (50 mL/Hr) IV Continuous <Continuous>  dextrose 50% Injectable 25 Gram(s) IV Push once  glucagon  Injectable 1 milliGRAM(s) IntraMuscular once  heparin   Injectable 5000 Unit(s) SubCutaneous every 8 hours  immune   globulin 10% (GAMMAGARD) IVPB 70 Gram(s) IV Intermittent once  insulin lispro (ADMELOG) corrective regimen sliding scale   SubCutaneous three times a day before meals  insulin lispro (ADMELOG) corrective regimen sliding scale   SubCutaneous at bedtime  levothyroxine 50 MICROGram(s) Oral daily  sodium chloride 0.9%. 1000 milliLiter(s) (100 mL/Hr) IV Continuous <Continuous>  tiotropium 2.5 MICROgram(s) Inhaler 2 Puff(s) Inhalation daily  vancomycin  IVPB 1750 milliGRAM(s) IV Intermittent every 12 hours    MEDICATIONS  (PRN):  acetaminophen     Tablet .. 650 milliGRAM(s) Oral every 6 hours PRN Temp greater or equal to 38C (100.4F), Mild Pain (1 - 3)  dextrose Oral Gel 15 Gram(s) Oral once PRN Blood Glucose LESS THAN 70 milliGRAM(s)/deciliter  lidocaine   4% Patch 1 Patch Transdermal once PRN moderate pain      Vital Signs Last 24 Hrs  T(C): 36.6 (07 Aug 2023 05:44), Max: 36.6 (06 Aug 2023 23:01)  T(F): 97.9 (07 Aug 2023 05:44), Max: 97.9 (06 Aug 2023 23:01)  HR: 60 (07 Aug 2023 05:44) (60 - 62)  BP: 143/62 (07 Aug 2023 05:44) (132/69 - 143/62)  BP(mean): --  RR: 18 (07 Aug 2023 05:44) (18 - 20)  SpO2: 97% (07 Aug 2023 05:44) (97% - 100%)    Parameters below as of 07 Aug 2023 05:44  Patient On (Oxygen Delivery Method): room air      CAPILLARY BLOOD GLUCOSE      POCT Blood Glucose.: 124 mg/dL (06 Aug 2023 21:45)  POCT Blood Glucose.: 91 mg/dL (06 Aug 2023 18:12)  POCT Blood Glucose.: 122 mg/dL (06 Aug 2023 12:37)  POCT Blood Glucose.: 103 mg/dL (06 Aug 2023 08:34)    I&O's Summary      PHYSICAL EXAM:  GENERAL: NAD, well-developed  HEAD:  Atraumatic, Normocephalic  EYES: EOMI, PERRLA, conjunctiva and sclera clear  NECK: Supple, No JVD  CHEST/LUNG: Clear to auscultation bilaterally; No wheeze  HEART: Regular rate and rhythm; No murmurs, rubs, or gallops  ABDOMEN: Soft, Nontender, Nondistended; Bowel sounds present  EXTREMITIES:  2+ Peripheral Pulses, No clubbing, cyanosis, or edema  PSYCH: AAOx3  NEUROLOGY: non-focal  SKIN: No rashes or lesions      LABS:                 RADIOLOGY & ADDITIONAL TESTS:    Imaging Personally Reviewed:    Consultant(s) Notes Reviewed:      Care Discussed with Consultants/Other Providers:   DATE OF SERVICE: 08-07-23 @ 07:57    Patient is a 57y old  Male who presents with a chief complaint of sepsis (06 Aug 2023 07:39)      SUBJECTIVE / OVERNIGHT EVENTS:  Patient reports feeling well this morning  Denies pain, fevers/chills, nausea/vomiting.   Reports normal appetite.  Expressed that he hopes he does not require a PICC line since he does not like having needles in his body.    MEDICATIONS  (STANDING):  atorvastatin 40 milliGRAM(s) Oral at bedtime  budesonide  80 MICROgram(s)/formoterol 4.5 MICROgram(s) Inhaler 2 Puff(s) Inhalation two times a day  dextrose 5%. 1000 milliLiter(s) (50 mL/Hr) IV Continuous <Continuous>  dextrose 50% Injectable 25 Gram(s) IV Push once  glucagon  Injectable 1 milliGRAM(s) IntraMuscular once  heparin   Injectable 5000 Unit(s) SubCutaneous every 8 hours  immune   globulin 10% (GAMMAGARD) IVPB 70 Gram(s) IV Intermittent once  insulin lispro (ADMELOG) corrective regimen sliding scale   SubCutaneous three times a day before meals  insulin lispro (ADMELOG) corrective regimen sliding scale   SubCutaneous at bedtime  levothyroxine 50 MICROGram(s) Oral daily  sodium chloride 0.9%. 1000 milliLiter(s) (100 mL/Hr) IV Continuous <Continuous>  tiotropium 2.5 MICROgram(s) Inhaler 2 Puff(s) Inhalation daily  vancomycin  IVPB 1750 milliGRAM(s) IV Intermittent every 12 hours    MEDICATIONS  (PRN):  acetaminophen     Tablet .. 650 milliGRAM(s) Oral every 6 hours PRN Temp greater or equal to 38C (100.4F), Mild Pain (1 - 3)  dextrose Oral Gel 15 Gram(s) Oral once PRN Blood Glucose LESS THAN 70 milliGRAM(s)/deciliter  lidocaine   4% Patch 1 Patch Transdermal once PRN moderate pain      Vital Signs Last 24 Hrs  T(C): 36.6 (07 Aug 2023 05:44), Max: 36.6 (06 Aug 2023 23:01)  T(F): 97.9 (07 Aug 2023 05:44), Max: 97.9 (06 Aug 2023 23:01)  HR: 60 (07 Aug 2023 05:44) (60 - 62)  BP: 143/62 (07 Aug 2023 05:44) (132/69 - 143/62)  BP(mean): --  RR: 18 (07 Aug 2023 05:44) (18 - 20)  SpO2: 97% (07 Aug 2023 05:44) (97% - 100%)    Parameters below as of 07 Aug 2023 05:44  Patient On (Oxygen Delivery Method): room air      CAPILLARY BLOOD GLUCOSE  POCT Blood Glucose.: 124 mg/dL (06 Aug 2023 21:45)  POCT Blood Glucose.: 91 mg/dL (06 Aug 2023 18:12)  POCT Blood Glucose.: 122 mg/dL (06 Aug 2023 12:37)  POCT Blood Glucose.: 103 mg/dL (06 Aug 2023 08:34)      PHYSICAL EXAM:  GENERAL: NAD, well-developed  HEAD:  Atraumatic, Normocephalic  EYES: conjunctiva and sclera clear  NECK: Supple  CHEST/LUNG: Clear to auscultation bilaterally; No wheeze  HEART: Regular rate and rhythm; No murmurs, rubs, or gallops  ABDOMEN: Soft, Nontender, Nondistended; Bowel sounds present  EXTREMITIES:  No cyanosis or edema  PSYCH: AAOx3  NEUROLOGY: non-focal  SKIN: Some erythema within gluteal fold with healing skin peeling, surround edema/erythema has resolved. Minimal pain to palpation.      LABS:                        12.2   8.11  )-----------( 279      ( 07 Aug 2023 06:50 )             37.3     08-07    132<L>  |  96<L>  |  18  ----------------------------<  119<H>  4.6   |  25  |  1.23    Ca    9.8      07 Aug 2023 06:50  Phos  5.4     08-07  Mg     2.00     08-07    TPro  6.7  /  Alb  3.9  /  TBili  <0.2  /  DBili  x   /  AST  17  /  ALT  29  /  AlkPhos  139<H>  08-07      Urinalysis Basic - ( 07 Aug 2023 06:50 )  Color: x / Appearance: x / SG: x / pH: x  Gluc: 119 mg/dL / Ketone: x  / Bili: x / Urobili: x   Blood: x / Protein: x / Nitrite: x   Leuk Esterase: x / RBC: x / WBC x   Sq Epi: x / Non Sq Epi: x / Bacteria: x               RADIOLOGY & ADDITIONAL TESTS: no new imaging    Imaging Personally Reviewed: n/a  Consultant(s) Notes Reviewed:  yes  Care Discussed with Consultants/Other Providers: yes

## 2023-08-07 NOTE — DIETITIAN INITIAL EVALUATION ADULT - PROBLEM/PLAN-6
DISPLAY PLAN FREE TEXT Graft Cartilage Fenestration Text: The cartilage was fenestrated with a 2mm punch biopsy to help facilitate graft survival and healing.

## 2023-08-07 NOTE — PROGRESS NOTE ADULT - PROBLEM SELECTOR PLAN 1
Patient presenting with worsening buttock cellulitis; patient has presented with buttock SSTI 4x in the last 4 months. CT abd/pelvis w/o evidence of any drainable collection or subcutaneous gas. S/p vanc and cefepime in ED, now on vancomycin. ID and wound care following, appreciate recs. Vanc trough 10.2 on 7/31 and 11.8 on repeat, will maintain current dose per ID. BCx neg at 72h. Ultrasound concerning for phlegmon and abscess. Now s/p gen surg I&D with improved pain/erythema/swelling. Vanc level 8/6 AM 19.8.   - ID following, appreciate recs: continue IV vanc 1750mg q12h  - wound care following, appreciate recs  - gen surg following, appreciate recs: f/u outpatient with Dr. Diaz within 1 week after discharge Patient presenting with worsening buttock cellulitis; patient has presented with buttock SSTI 4x in the last 4 months. CT abd/pelvis w/o evidence of any drainable collection or subcutaneous gas. S/p vanc and cefepime in ED, now on IV vancomycin. ID and wound care following, appreciate recs. Vanc trough 10.2 on 7/31 and 11.8 on repeat, maintained 1750mg dose per ID. BCx neg at 72h. Ultrasound concerning for phlegmon and abscess. Now s/p gen surg I&D with improved pain/erythema/swelling, wound cx positive for MRSA. Vanc level 8/6 AM 19.8.   - continue IV vancomycin 1750mg q12h  - ID following, appreciate recs: pending outpatient abx regimen  - wound care following, appreciate recs  - gen surg following, appreciate recs: f/u outpatient with Dr. Diaz within 1 week after discharge

## 2023-08-07 NOTE — PROGRESS NOTE ADULT - ATTENDING COMMENTS
Chart reviewed. Vitals and Labs noted.   Pt seen and examined at bedside. Plan formulated with the resident. Detail H&P as above.     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  Also found to have TASH which has responded to IVF hydration.    Pain is overall improving. stable on PRN Tylenol. pain is much improved.   Blood cxs 7/29/23 so far are negative.  Vanco per ID. monitor trough.   Monitoring rt gluteal region for any increased drainage, dark discoloration, and/or increased fluctuance.  US shows right buttock heterogeneous collection measuring 4.3 x 3.8 x 2.8 cm suspicious for a abscess and phlegmon.  Appreciate surgery, wound care, now with spontaneous drainage/ popped per the pt.    SURGERY/wound care follow up.     Hx of CVID on monthly Gammagard.  OP Immunologist: Dr. Mitchell Boxer ([facility])  Last Gammagard 7/6/23. Due 8/3, the infusion ordered. but pt been refusing stating he wants SD formula.   D/w the sister Brittany.  d/w Dr. Boxer, Pt requires Gammagard SD due to his underlying IgA deficiency (there is risk of infusion reaction with regular Gammagard).   d/w infusion center . confirmed that his dosing is 70 gram over 5-6 hrs.  d/w Pharmacy at [facility], will likely need to have special order.  Pharmacy will let us know in regards to availability, tentatively will arranged for 8/8/23.   c/w qmonthly infusions after discharge.    ID f/u in regards to final abx regimen.     - Dr. JAKE Matoset ([facility])  - [phone]

## 2023-08-07 NOTE — PROGRESS NOTE ADULT - PROBLEM SELECTOR PLAN 3
Patient with hx of chronic hyponatremia. Na 124 previously due to polydipsia. Also recently on bactrim, now discontinued. S/p 1 L NS in ED. Patient reports poor PO intake over the past week. Uosm 342, Farrukh <20. Patient Na stable in 130s 7/31-8/6.  - Continue to monitor Patient with hx of chronic hyponatremia. Na 124 previously due to polydipsia. Also recently on bactrim, now discontinued. S/p 1 L NS in ED. Patient reports poor PO intake over the past week. Uosm 342, Farrukh <20. Patient Na stable in 130s 7/31-8/7.  - Continue to monitor

## 2023-08-07 NOTE — DIETITIAN INITIAL EVALUATION ADULT - OTHER INFO
57 year old male with a PMH of Schizoaffective D/O (Bipolar Type), HTN, HLD, Hypothyroidism, Daily Smoker, CVID/ Hypogammaglobulinemia (monthly IVIG - last 7/6/2023), T2DM, COPD, recent Lt gluteal MRSA abscess/cellulitis w/ MRSA bacteremia s/p Debridement 6/2023 presenting with worsening right buttock cellulitis. Pt found to be septic likely 2/2 buttock cellulitis s/p surgery I&D. Wound cx positive for MRSA per chart.    Patient reports good appetite and has bee finishing all his meals. No GI distress reported. Has missing teeth, but states doing well on regular diet. Has no food allergies. States UBW was 300 lbs. x 2 months and been intentionally losing weight. ABW is 240.5 lbs. (7/29) per chart indicating a -19.9% weight loss, will monitor. No edema or pressure injuries noted per RN flow sheet.    Patient w/ general questions regarding healthy eating habits for weight loss/glycemic control, which was reviewed.

## 2023-08-08 ENCOUNTER — TRANSCRIPTION ENCOUNTER (OUTPATIENT)
Age: 58
End: 2023-08-08

## 2023-08-08 LAB
ALBUMIN SERPL ELPH-MCNC: 3.8 G/DL — SIGNIFICANT CHANGE UP (ref 3.3–5)
ALP SERPL-CCNC: 136 U/L — HIGH (ref 40–120)
ALT FLD-CCNC: 28 U/L — SIGNIFICANT CHANGE UP (ref 4–41)
ANION GAP SERPL CALC-SCNC: 15 MMOL/L — HIGH (ref 7–14)
AST SERPL-CCNC: 19 U/L — SIGNIFICANT CHANGE UP (ref 4–40)
BILIRUB SERPL-MCNC: 0.2 MG/DL — SIGNIFICANT CHANGE UP (ref 0.2–1.2)
BUN SERPL-MCNC: 15 MG/DL — SIGNIFICANT CHANGE UP (ref 7–23)
CALCIUM SERPL-MCNC: 9.9 MG/DL — SIGNIFICANT CHANGE UP (ref 8.4–10.5)
CHLORIDE SERPL-SCNC: 98 MMOL/L — SIGNIFICANT CHANGE UP (ref 98–107)
CO2 SERPL-SCNC: 23 MMOL/L — SIGNIFICANT CHANGE UP (ref 22–31)
CREAT SERPL-MCNC: 1.02 MG/DL — SIGNIFICANT CHANGE UP (ref 0.5–1.3)
CULTURE RESULTS: SIGNIFICANT CHANGE UP
EGFR: 87 ML/MIN/1.73M2 — SIGNIFICANT CHANGE UP
GLUCOSE BLDC GLUCOMTR-MCNC: 103 MG/DL — HIGH (ref 70–99)
GLUCOSE BLDC GLUCOMTR-MCNC: 105 MG/DL — HIGH (ref 70–99)
GLUCOSE BLDC GLUCOMTR-MCNC: 92 MG/DL — SIGNIFICANT CHANGE UP (ref 70–99)
GLUCOSE BLDC GLUCOMTR-MCNC: 99 MG/DL — SIGNIFICANT CHANGE UP (ref 70–99)
GLUCOSE SERPL-MCNC: 96 MG/DL — SIGNIFICANT CHANGE UP (ref 70–99)
HCT VFR BLD CALC: 39.5 % — SIGNIFICANT CHANGE UP (ref 39–50)
HGB BLD-MCNC: 12.6 G/DL — LOW (ref 13–17)
MAGNESIUM SERPL-MCNC: 2 MG/DL — SIGNIFICANT CHANGE UP (ref 1.6–2.6)
MCHC RBC-ENTMCNC: 26.8 PG — LOW (ref 27–34)
MCHC RBC-ENTMCNC: 31.9 GM/DL — LOW (ref 32–36)
MCV RBC AUTO: 83.9 FL — SIGNIFICANT CHANGE UP (ref 80–100)
NRBC # BLD: 0 /100 WBCS — SIGNIFICANT CHANGE UP (ref 0–0)
NRBC # FLD: 0 K/UL — SIGNIFICANT CHANGE UP (ref 0–0)
ORGANISM # SPEC MICROSCOPIC CNT: SIGNIFICANT CHANGE UP
ORGANISM # SPEC MICROSCOPIC CNT: SIGNIFICANT CHANGE UP
PHOSPHATE SERPL-MCNC: 4.9 MG/DL — HIGH (ref 2.5–4.5)
PLATELET # BLD AUTO: 260 K/UL — SIGNIFICANT CHANGE UP (ref 150–400)
POTASSIUM SERPL-MCNC: 4.4 MMOL/L — SIGNIFICANT CHANGE UP (ref 3.5–5.3)
POTASSIUM SERPL-SCNC: 4.4 MMOL/L — SIGNIFICANT CHANGE UP (ref 3.5–5.3)
PROT SERPL-MCNC: 6.6 G/DL — SIGNIFICANT CHANGE UP (ref 6–8.3)
RBC # BLD: 4.71 M/UL — SIGNIFICANT CHANGE UP (ref 4.2–5.8)
RBC # FLD: 15.1 % — HIGH (ref 10.3–14.5)
SODIUM SERPL-SCNC: 136 MMOL/L — SIGNIFICANT CHANGE UP (ref 135–145)
[facility] SOURCE: SIGNIFICANT CHANGE UP
WBC # BLD: 7.96 K/UL — SIGNIFICANT CHANGE UP (ref 3.8–10.5)
WBC # FLD AUTO: 7.96 K/UL — SIGNIFICANT CHANGE UP (ref 3.8–10.5)

## 2023-08-08 PROCEDURE — 99232 SBSQ HOSP IP/OBS MODERATE 35: CPT

## 2023-08-08 RX ADMIN — TIOTROPIUM BROMIDE 2 PUFF(S): 18 CAPSULE ORAL; RESPIRATORY (INHALATION) at 09:43

## 2023-08-08 RX ADMIN — HEPARIN SODIUM 5000 UNIT(S): 5000 INJECTION INTRAVENOUS; SUBCUTANEOUS at 05:29

## 2023-08-08 RX ADMIN — HEPARIN SODIUM 5000 UNIT(S): 5000 INJECTION INTRAVENOUS; SUBCUTANEOUS at 22:00

## 2023-08-08 RX ADMIN — BUDESONIDE AND FORMOTEROL FUMARATE DIHYDRATE 2 PUFF(S): 160; 4.5 AEROSOL RESPIRATORY (INHALATION) at 09:43

## 2023-08-08 RX ADMIN — Medication 300 MILLIGRAM(S): at 21:57

## 2023-08-08 RX ADMIN — BUDESONIDE AND FORMOTEROL FUMARATE DIHYDRATE 2 PUFF(S): 160; 4.5 AEROSOL RESPIRATORY (INHALATION) at 22:00

## 2023-08-08 RX ADMIN — HEPARIN SODIUM 5000 UNIT(S): 5000 INJECTION INTRAVENOUS; SUBCUTANEOUS at 13:13

## 2023-08-08 RX ADMIN — Medication 300 MILLIGRAM(S): at 09:45

## 2023-08-08 RX ADMIN — ATORVASTATIN CALCIUM 40 MILLIGRAM(S): 80 TABLET, FILM COATED ORAL at 22:00

## 2023-08-08 RX ADMIN — Medication 50 MICROGRAM(S): at 05:29

## 2023-08-08 NOTE — PROGRESS NOTE ADULT - SUBJECTIVE AND OBJECTIVE BOX
DATE OF SERVICE: 08-08-23 @ 11:18    Patient is a 57y old  Male who presents with a chief complaint of Cellulitis of buttock     (07 Aug 2023 14:57)      SUBJECTIVE / OVERNIGHT EVENTS:  Patient feels well this morning  Reports 3/10 pain when sitting directly on gluteal wound, otherwise no pain/discomfort  Denies systemic sx including n/v, fevers/chills.    MEDICATIONS  (STANDING):  atorvastatin 40 milliGRAM(s) Oral at bedtime  budesonide  80 MICROgram(s)/formoterol 4.5 MICROgram(s) Inhaler 2 Puff(s) Inhalation two times a day  dextrose 5%. 1000 milliLiter(s) (50 mL/Hr) IV Continuous <Continuous>  dextrose 50% Injectable 25 Gram(s) IV Push once  glucagon  Injectable 1 milliGRAM(s) IntraMuscular once  heparin   Injectable 5000 Unit(s) SubCutaneous every 8 hours  immune  globulin 10% (GAMMAGARD S/D) IVPB 70 Gram(s) IV Intermittent once  insulin lispro (ADMELOG) corrective regimen sliding scale   SubCutaneous three times a day before meals  insulin lispro (ADMELOG) corrective regimen sliding scale   SubCutaneous at bedtime  levothyroxine 50 MICROGram(s) Oral daily  tiotropium 2.5 MICROgram(s) Inhaler 2 Puff(s) Inhalation daily  vancomycin  IVPB 1500 milliGRAM(s) IV Intermittent every 12 hours    MEDICATIONS  (PRN):  acetaminophen     Tablet .. 650 milliGRAM(s) Oral every 6 hours PRN Temp greater or equal to 38C (100.4F), Mild Pain (1 - 3)  dextrose Oral Gel 15 Gram(s) Oral once PRN Blood Glucose LESS THAN 70 milliGRAM(s)/deciliter  lidocaine   4% Patch 1 Patch Transdermal once PRN moderate pain      Vital Signs Last 24 Hrs  T(C): 36.3 (08 Aug 2023 05:30), Max: 36.5 (07 Aug 2023 21:55)  T(F): 97.3 (08 Aug 2023 05:30), Max: 97.7 (07 Aug 2023 21:55)  HR: 62 (08 Aug 2023 05:30) (56 - 62)  BP: 148/87 (08 Aug 2023 05:30) (114/66 - 160/90)  BP(mean): --  RR: 18 (08 Aug 2023 05:30) (16 - 18)  SpO2: 100% (08 Aug 2023 05:30) (99% - 100%)    Parameters below as of 08 Aug 2023 05:30  Patient On (Oxygen Delivery Method): room air      CAPILLARY BLOOD GLUCOSE  POCT Blood Glucose.: 103 mg/dL (08 Aug 2023 08:38)  POCT Blood Glucose.: 109 mg/dL (07 Aug 2023 21:33)  POCT Blood Glucose.: 110 mg/dL (07 Aug 2023 17:50)  POCT Blood Glucose.: 110 mg/dL (07 Aug 2023 12:43)      PHYSICAL EXAM:  GENERAL: NAD, well-developed  HEAD:  Atraumatic, Normocephalic  EYES: EOMI, PERRLA, conjunctiva and sclera clear  NECK: Supple, No JVD  CHEST/LUNG: Clear to auscultation bilaterally; No wheeze  HEART: Regular rate and rhythm; No murmurs, rubs, or gallops  ABDOMEN: Soft, Nontender, Nondistended; Bowel sounds present  EXTREMITIES:  2+ Peripheral Pulses, No clubbing, cyanosis, or edema  PSYCH: AAOx3  NEUROLOGY: non-focal  SKIN: No rashes or lesions      LABS:                        12.6   7.96  )-----------( 260      ( 08 Aug 2023 05:35 )             39.5     08-08    136  |  98  |  15  ----------------------------<  96  4.4   |  23  |  1.02    Ca    9.9      08 Aug 2023 05:35  Phos  4.9     08-08  Mg     2.00     08-08    TPro  6.6  /  Alb  3.8  /  TBili  0.2  /  DBili  x   /  AST  19  /  ALT  28  /  AlkPhos  136<H>  08-08      Urinalysis Basic - ( 08 Aug 2023 05:35 )  Color: x / Appearance: x / SG: x / pH: x  Gluc: 96 mg/dL / Ketone: x  / Bili: x / Urobili: x   Blood: x / Protein: x / Nitrite: x   Leuk Esterase: x / RBC: x / WBC x   Sq Epi: x / Non Sq Epi: x / Bacteria: x        RADIOLOGY & ADDITIONAL TESTS: no new imaging    Imaging Personally Reviewed: n/a  Consultant(s) Notes Reviewed:  yes  Care Discussed with Consultants/Other Providers: yes

## 2023-08-08 NOTE — PROGRESS NOTE ADULT - ATTENDING COMMENTS
Chart reviewed. Vitals and Labs noted.   Pt seen and examined at bedside. Plan formulated with the resident. Detail H&P as above.     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  Also found to have TASH which has responded to IVF hydration.    Pain is overall improed. stable on PRN Tylenol.  Monitoring rt gluteal region for any increased drainage, dark discoloration, and/or increased fluctuance.  US shows right buttock heterogeneous collection measuring 4.3 x 3.8 x 2.8 cm suspicious for a abscess and phlegmon.  Appreciate surgery, wound care, now with spontaneous drainage/ popped per the pt.    SURGERY/wound care follow up.   Blood cxs 7/29/23: negative.  Vanco per ID. monitor trough. plan for po transition per house ID.     Hx of CVID on monthly Gammagard.  OP Immunologist: Dr. Mitchell Boxer ([facility])  Last Gammagard 7/6/23. Due 8/3, the infusion ordered. but pt been refusing stating he wants SD formula.   D/w the sister Brittany.  d/w Dr. Boxer, Pt requires Gammagard SD due to his underlying IgA deficiency (there is risk of infusion reaction with regular Gammagard).   d/w infusion center . confirmed that his dosing is 70 gram infused over 5-6 hrs.  d/w Pharmacy at [facility], have special order placed.  Pharmacy will let us know in regards to availability, tentatively will arranged for 8/8/23.   c/w qmonthly infusions after discharge.    Dc planning after Gammagard SD infusion.    Kemal Tyson will be covering for the pt starting 8/9/23. He can be reached at  if needed.     - Dr. JAKE Morales ([facility])  - [phone]

## 2023-08-08 NOTE — DISCHARGE NOTE NURSING/CASE MANAGEMENT/SOCIAL WORK - NSSCTYPOFSERV_GEN_ALL_CORE
RN/Wound care. Nurse to visit on 08/09/2023. Nurse to call prior to visit to arrange. RN/Wound care. Nurse to visit on 08/10/2023. Nurse to call prior to visit to arrange.

## 2023-08-08 NOTE — PROGRESS NOTE ADULT - SUBJECTIVE AND OBJECTIVE BOX
[facility]-- WOUND TEAM -- FOLLOW UP NOTE  --------------------------------------------------------------------------------    subjective: Patient seen and examined at bedside.     Interval HPI/24 hour events:   Bradycardia episodes   Chart reviewed including labs and relevant images      Diet:  Diet, DASH/TLC:   Sodium & Cholesterol Restricted  Consistent Carbohydrate Evening Snack (CSTCHOSN)  1500mL Fluid Restriction ([id number]) (07-29-23 @ 22:57)      ROS: General/ SKIN/ see HPI  all other systems negative  pt unable to offer    ALLERGIES & MEDICATIONS  --------------------------------------------------------------------------------  Allergies    penicillin (Rash)  amoxicillin (Fever)    Intolerances    STANDING INPATIENT MEDICATIONS    atorvastatin 40 milliGRAM(s) Oral at bedtime  budesonide  80 MICROgram(s)/formoterol 4.5 MICROgram(s) Inhaler 2 Puff(s) Inhalation two times a day  dextrose 5%. 1000 milliLiter(s) IV Continuous <Continuous>  dextrose 50% Injectable 25 Gram(s) IV Push once  glucagon  Injectable 1 milliGRAM(s) IntraMuscular once  heparin   Injectable 5000 Unit(s) SubCutaneous every 8 hours  immune  globulin 10% (GAMMAGARD S/D) IVPB 70 Gram(s) IV Intermittent once  insulin lispro (ADMELOG) corrective regimen sliding scale   SubCutaneous three times a day before meals  insulin lispro (ADMELOG) corrective regimen sliding scale   SubCutaneous at bedtime  levothyroxine 50 MICROGram(s) Oral daily  tiotropium 2.5 MICROgram(s) Inhaler 2 Puff(s) Inhalation daily  vancomycin  IVPB 1500 milliGRAM(s) IV Intermittent every 12 hours      PRN INPATIENT MEDICATION  acetaminophen     Tablet .. 650 milliGRAM(s) Oral every 6 hours PRN  dextrose Oral Gel 15 Gram(s) Oral once PRN  lidocaine   4% Patch 1 Patch Transdermal once PRN    Vital signs:  T(C): 36.3 (08-08-23 @ 05:30), Max: 36.5 (08-07-23 @ 21:55)  HR: 62 (08-08-23 @ 05:30) (56 - 62)  BP: 148/87 (08-08-23 @ 05:30) (114/66 - 160/90)  RR: 18 (08-08-23 @ 05:30) (16 - 18)  SpO2: 100% (08-08-23 @ 05:30) (99% - 100%)    Wt(kg): --          LABS/ CULTURES/ RADIOLOGY:              12.6   7.96  >-----------<  260      [08-08-23 @ 05:35]              39.5     136  |  98  |  15  ----------------------------<  96      [08-08-23 @ 05:35]  4.4   |  23  |  1.02        Ca     9.9     [08-08-23 @ 05:35]      Mg     2.00     [08-08-23 @ 05:35]      Phos  4.9     [08-08-23 @ 05:35]    TPro  6.6  /  Alb  3.8  /  TBili  0.2  /  DBili  x   /  AST  19  /  ALT  28  /  AlkPhos  136  [08-08-23 @ 05:35]              CAPILLARY BLOOD GLUCOSE      POCT Blood Glucose.: 103 mg/dL (08 Aug 2023 08:38)  POCT Blood Glucose.: 109 mg/dL (07 Aug 2023 21:33)  POCT Blood Glucose.: 110 mg/dL (07 Aug 2023 17:50)  POCT Blood Glucose.: 110 mg/dL (07 Aug 2023 12:43)      A1C with Estimated Average Glucose Result: 5.2 % (07-14-23 @ 23:42)  A1C with Estimated Average Glucose Result: 6.3 % (05-08-23 @ 05:55)                 [facility]-- WOUND TEAM -- FOLLOW UP NOTE  --------------------------------------------------------------------------------    subjective: Patient seen and examined at bedside. Patient received laying comfortable in bed, denies pain or discomfort at rest. Reports right buttock pain when he sits. Reports pain significantly improve after I&D. Denies issues with urinating or stool. Endorses stools brown in color and no gross blood observed. Reports he is eager to go home.     Interval HPI/24 hour events:   Episodes of Bradycardia (HR upper 50s)   Abscess culture from 8/3 resulted in numerous MRSA  WBC wnl, afebrile.   ID continues to follow up with patient   Chart reviewed including labs and relevant images    Diet:  Diet, DASH/TLC:   Sodium & Cholesterol Restricted  Consistent Carbohydrate Evening Snack (CSTCHOSN)  1500mL Fluid Restriction ([id number]) (07-29-23 @ 22:57)      ROS: General/ SKIN/ see HPI  all other systems negative    ALLERGIES & MEDICATIONS  --------------------------------------------------------------------------------  Allergies    penicillin (Rash)  amoxicillin (Fever)    Intolerances    STANDING INPATIENT MEDICATIONS    atorvastatin 40 milliGRAM(s) Oral at bedtime  budesonide  80 MICROgram(s)/formoterol 4.5 MICROgram(s) Inhaler 2 Puff(s) Inhalation two times a day  dextrose 5%. 1000 milliLiter(s) IV Continuous <Continuous>  dextrose 50% Injectable 25 Gram(s) IV Push once  glucagon  Injectable 1 milliGRAM(s) IntraMuscular once  heparin   Injectable 5000 Unit(s) SubCutaneous every 8 hours  immune  globulin 10% (GAMMAGARD S/D) IVPB 70 Gram(s) IV Intermittent once  insulin lispro (ADMELOG) corrective regimen sliding scale   SubCutaneous three times a day before meals  insulin lispro (ADMELOG) corrective regimen sliding scale   SubCutaneous at bedtime  levothyroxine 50 MICROGram(s) Oral daily  tiotropium 2.5 MICROgram(s) Inhaler 2 Puff(s) Inhalation daily  vancomycin  IVPB 1500 milliGRAM(s) IV Intermittent every 12 hours    PRN INPATIENT MEDICATION  acetaminophen     Tablet .. 650 milliGRAM(s) Oral every 6 hours PRN  dextrose Oral Gel 15 Gram(s) Oral once PRN  lidocaine   4% Patch 1 Patch Transdermal once PRN    Vital signs:  T(C): 36.3 (08-08-23 @ 05:30), Max: 36.5 (08-07-23 @ 21:55)  HR: 62 (08-08-23 @ 05:30) (56 - 62)  BP: 148/87 (08-08-23 @ 05:30) (114/66 - 160/90)  RR: 18 (08-08-23 @ 05:30) (16 - 18)  SpO2: 100% (08-08-23 @ 05:30) (99% - 100%)    Wt(kg): --240.5 lbs (07-29-23)    Constitutional: NAD/AOX4/received laying in bed, on contact precautions.   Disheveled, obese   (+) low airloss support surface  HEENT:  NC/AT, non icteric, mucosa moist, throat clear, trachea midline, neck supple  Cardiovascular: Rate regular   Respiratory: Equal chest expansion, room air.   Gastrointestinal: soft NT/ND.   : wnl, scrotum soft, no erythema, no induration.   Neurology: sensation grossly intact, follows commands   Musculoskeletal: Up and [place], full ROM of all fingers and extremities  Vascular: BLE equally warm, no edema   Skin:  moist w/ good turgor  Left great toe with intact yellow callus, intact   Left lateral lower leg soft tissue defect, healed wound.   Right second finger previous trauma wound now healed exposing 100% hypopigmentation. No surrounding erythema   Right anterior forearm with prev stable non draining scab now with 100% hypopigmentation, no surrounding erythema, no induration.   Back: no wounds to thoracic spine   Left groin/perineum w/o induration or tenderness.   Right buttock  + trace edema, minimal induration to inner buttock. Erythema resolved, increased warmth resolved.   Centrally in right inner buttock noted a narrow ulcer measuring [id number], (prev 0.5cm0.5cm), patient with increase pain when taking depth of wound  no puss express, undermining at approx 6 o'clock extends 2.5cm, no fecal content observed, scant serosanguinous drainage, no odor. No active bleeding. Unable to visualized wound bed given narrow opening. Unable to express purulent drainage. Periwound skin as noted above. No crepitus, no fluctuance. No temperature changes on palpation.   Psych: calm and cooperative.     LABS/ CULTURES/ RADIOLOGY:              12.6   7.96  >-----------<  260      [08-08-23 @ 05:35]              39.5     136  |  98  |  15  ----------------------------<  96      [08-08-23 @ 05:35]  4.4   |  23  |  1.02        Ca     9.9     [08-08-23 @ 05:35]      Mg     2.00     [08-08-23 @ 05:35]      Phos  4.9     [08-08-23 @ 05:35]    TPro  6.6  /  Alb  3.8  /  TBili  0.2  /  DBili  x   /  AST  19  /  ALT  28  /  AlkPhos  136  [08-08-23 @ 05:35]      CAPILLARY BLOOD GLUCOSE      POCT Blood Glucose.: 103 mg/dL (08 Aug 2023 08:38)  POCT Blood Glucose.: 109 mg/dL (07 Aug 2023 21:33)  POCT Blood Glucose.: 110 mg/dL (07 Aug 2023 17:50)  POCT Blood Glucose.: 110 mg/dL (07 Aug 2023 12:43)      A1C with Estimated Average Glucose Result: 5.2 % (07-14-23 @ 23:42)  A1C with Estimated Average Glucose Result: 6.3 % (05-08-23 @ 05:55)

## 2023-08-08 NOTE — PROVIDER CONTACT NOTE (OTHER) - BACKGROUND
pt is admitted with RT buttock cellulitis

## 2023-08-08 NOTE — PROVIDER CONTACT NOTE (OTHER) - ASSESSMENT
pt is alert and oriented. he is asymptomatic, denies any discomfort.
pt is alert and oriented. he is asymptomatic, denies any discomfort.
pt is alert and oriented. he is asymptomatic, denies any discomfort. /90 HR 56

## 2023-08-08 NOTE — PROGRESS NOTE ADULT - ASSESSMENT
57 M PMHx Schizoaffective D/O (Bipolar Type), HTN, HLD, Hypothyroidism, Daily Smoker, CVID/ Hypogammaglobulinemia (monthly IVIG - last 7/6/2023), T2DM, COPD, recent Lt gluteal MRSA abscess/cellulitis w/ MRSA bacteremia s/p Debridement 6/2023 presenting with worsening right buttock cellulitis  Fever, leukocytosis  Prior MRSA bacteremia  Prior MRSA wound infection  Multiple episodes of recurring abscess/gluteal infection 2/2 MRSA  CT worsening edema R gluteal fold, phlegmonous change perineal, no drainable lesion (7/29)  UA neg  Gluteal SSTI  S/p I+D 8/3, culture MRSA S Doxy  Overall, Fever, SSTI recurring, hypogammaglobulinemia  - Vanco 1500mg q 12 (continue--monitor levels), when discharge planning can send out on Doxycycline 100mg q 12 to complete 14 additional days  - F/U BCXs  - Surgical eval to wound/wound care  - Trend WBC to normal    Signing off. Please call with further questions or change in status    Josr Downs MD  Contact on TEAMS messaging from 9am - 5pm  From 5pm-9am, on weekends, or if no response call [phone]

## 2023-08-08 NOTE — PROGRESS NOTE ADULT - PROBLEM SELECTOR PLAN 2
Patient was scheduled for outpatient IVIG infusion on 8/3. Gammagard 70mg IV ordered, but patient reports he must take gammagard SD with benadryl pretreatment. Pharmacy has ordered this product to be given inpatient.  - pending 1x gammagard SD 70mg infusion

## 2023-08-08 NOTE — PROGRESS NOTE ADULT - SUBJECTIVE AND OBJECTIVE BOX
CC: F/U for MRSA    Saw/spoke to patient. Unchanged, decreased pain. Overall improved.    Allergies  penicillin (Rash)  amoxicillin (Fever)    ANTIMICROBIALS:  vancomycin  IVPB 1500 every 12 hours    PE:    Vital Signs Last 24 Hrs  T(C): 36.3 (08 Aug 2023 13:02), Max: 36.5 (07 Aug 2023 21:55)  T(F): 97.3 (08 Aug 2023 13:02), Max: 97.7 (07 Aug 2023 21:55)  HR: 60 (08 Aug 2023 13:02) (56 - 62)  BP: 130/90 (08 Aug 2023 13:02) (130/90 - 160/90)  RR: 17 (08 Aug 2023 13:02) (17 - 18)  SpO2: 97% (08 Aug 2023 13:02) (97% - 100%)    Gen: AOx3, NAD, non-toxic  CV: Nontachycardic  Resp: Breathing comfortably, RA  Abd: Soft, nontender  IV/Skin: No thrombophlebitis    LABS:                        12.6   7.96  )-----------( 260      ( 08 Aug 2023 05:35 )             39.5     08-08    136  |  98  |  15  ----------------------------<  96  4.4   |  23  |  1.02    Ca    9.9      08 Aug 2023 05:35  Phos  4.9     08-08  Mg     2.00     08-08    TPro  6.6  /  Alb  3.8  /  TBili  0.2  /  DBili  x   /  AST  19  /  ALT  28  /  AlkPhos  136<H>  08-08    Urinalysis Basic - ( 08 Aug 2023 05:35 )    Color: x / Appearance: x / SG: x / pH: x  Gluc: 96 mg/dL / Ketone: x  / Bili: x / Urobili: x   Blood: x / Protein: x / Nitrite: x   Leuk Esterase: x / RBC: x / WBC x   Sq Epi: x / Non Sq Epi: x / Bacteria: x    MICROBIOLOGY:  Vancomycin Level, Trough: 21.8 ug/mL (08-07-23 @ 17:25)    .Abscess Buttock  08-03-23   Numerous Methicillin Resistant Staphylococcus aureus  --  Methicillin resistant Staphylococcus aureus    .Blood Blood-Peripheral  07-29-23   No growth at 5 days  --  --    .Blood Blood-Peripheral  07-29-23   No growth at 5 days  --  --    .Abscess Right perineum  07-17-23   Few Methicillin Resistant Staphylococcus aureus  --  Methicillin resistant Staphylococcus aureus    Clean Catch Clean Catch (Midstream)  07-14-23   <10,000 CFU/mL Normal Urogenital Sydni  --  --    (otherwise reviewed)    RADIOLOGY:    8/2 US:  FINDINGS/  IMPRESSION:    In the right buttock, there is heterogeneous collection measuring 4.3 x   3.8 x 2.8 cm with peripheral color Doppler flow and adjacent subcutaneous   edema. Findings are suspicious for a abscess and phlegmon.

## 2023-08-08 NOTE — PROGRESS NOTE ADULT - PROBLEM SELECTOR PLAN 1
Patient presenting with worsening buttock cellulitis; patient has presented with buttock SSTI 4x in the last 4 months. CT abd/pelvis w/o evidence of any drainable collection or subcutaneous gas. S/p vanc and cefepime in ED, now on IV vancomycin. ID and wound care following, appreciate recs. Vanc trough 10.2 on 7/31 and 11.8 on repeat, maintained 1750mg dose per ID. BCx neg at 72h. Ultrasound concerning for phlegmon and abscess. Now s/p gen surg I&D with improved pain/erythema/swelling, wound cx positive for MRSA. Vanc trough 8/7 was 21.8 so vanc dose was reduced to 1500mg.   - continue IV vancomycin 1500mg q12h  - ID following, appreciate recs: discharge with doxy 100mg q12 x14d  - wound care following, appreciate recs  - gen surg following, appreciate recs: f/u outpatient with Dr. Diaz within 1 week after discharge

## 2023-08-08 NOTE — PROGRESS NOTE ADULT - PROBLEM SELECTOR PLAN 4
Cr 1.46 on admission, up from baseline of 0.8. FeNa 0.2% suggested pre-renal etiology, likely secondary to sepsis and poor PO intake. S/p 1L NS, now back to baseline.  - continue to monitor Cr  - avoid nephrotoxins, renally dose meds

## 2023-08-08 NOTE — PROGRESS NOTE ADULT - PROBLEM SELECTOR PLAN 3
Patient with hx of chronic hyponatremia. Na 124 previously due to polydipsia. Also recently on bactrim, now discontinued. S/p 1 L NS in ED. Patient reports poor PO intake over the past week. Uosm 342, Farrukh <20. Patient Na stable in 130s 7/31-8/8.  - Continue to monitor

## 2023-08-08 NOTE — DISCHARGE NOTE NURSING/CASE MANAGEMENT/SOCIAL WORK - PATIENT PORTAL LINK FT
You can access the FollowMyHealth Patient Portal offered by [facility] by registering at the following website: [email or web address]. By joining [employer]’s FollowMyHealth portal, you will also be able to view your health information using other applications (apps) compatible with our system.

## 2023-08-08 NOTE — PROGRESS NOTE ADULT - ASSESSMENT
Mr. Cristina is a 57y M with PMH Schizoaffective D/O (Bipolar Type), HTN, HLD, Hypothyroidism, Daily Smoker, CVID/ Hypogammaglobulinemia (monthly IVIG - last 7/6/2023), T2DM, COPD, recent Lt gluteal MRSA abscess/cellulitis w/ MRSA bacteremia s/p Debridement 6/2023 presenting with worsening right buttock cellulitis. Pt found to be septic likely 2/2 buttock cellulitis. Pt admitted for further management, now s/p surgery I&D. Wound cx positive for MRSA.

## 2023-08-08 NOTE — PROVIDER CONTACT NOTE (OTHER) - ACTION/TREATMENT ORDERED:
no recommendation at this time. will continue monitoring.
MD notified and aware. No recommendation at this time. will continue monitoring.
MD notified and aware. No recommendation at this time. will continue monitoring.

## 2023-08-08 NOTE — PROGRESS NOTE ADULT - ASSESSMENT
Assessment/Plan: Mr. Cristina is a 57y M with PMH Schizoaffective D/O (Bipolar Type), HTN, HLD, Hypothyroidism, Daily Smoker, CVID/ Hypogammaglobulinemia (monthly IVIG - last 7/6/2023), T2DM, COPD, recent Lt gluteal MRSA abscess/cellulitis w/ MRSA bacteremia s/p Debridement 6/2023 presenting with worsening right buttock cellulitis. Pt found to be septic likely 2/2 buttock cellulitis. Pt admitted for further management, now s/p surgery I&D. Wound cx positive for MRSA.    Wound care surgery follow up for right inner buttock abscess s/p I&D by general surgery. General surgery requesting to re-eval after I&D. Patient pending discharge to home.   Right inner buttock abscess s/p I&D on 8/3 by general surgery on   -wound cultures of right buttock receiving vancomycin as per ID  -Pending Immune Globulin 10% infusion today    -Pending discharge to home with [facility] to be seen by wound care Attending Dr Diaz   -Appointment set up with Wound care MD Diaz on 8/15 at 0900 at [address] [place] [address].   -Narrow wound with undermining at 6 o'clock extending 2.5cm, depth 3cm  -No puss express, small serosanguinous drainage. No odor.   -Wound bed unable to be visualized given narrow opening   -Erythema/edema resolved   -Pain improving   -Recommend packing with 1/4 inch Iodoform to wick drainage   -Topical recommendations: Clean wound and periwound skin with NS. Pat dry. Apply liquid barrier film to periwound skin, lightly pack depth and dead space with 1/4" Iodoform packing, leave at least 2 inches out to wick. Cover with ABD pad and support with briefs. Change three times a week.  If packing becomes soil or falls off then keep area clean and dry.   -Patient will receive VNS for dressing changes   -No stool observed       Continue low airloss support surface.  Continue to turn and position every 2 hours with z-roz fluidized positioning device.  Continue use of Complete Cair pressure offloading boots.  Continue Nutritional management as per RD recommendations.    Upon discharge follow up at outpatient [facility]. [address] [place] [address]. [phone].    Will continue to follow while inpatient. Please reconsult earlier if needed   Thank you.    Discussed findings and plan with primary team, primary RN and   Discussed findings with wound care MD Emily Thomas, AGNP-BC, CW    pager #[phone] or available in MS teams     If after 4PM or before 7:30AM on Mon-Friday or weekend/holiday please contact general surgery for urgent matters.   Team A- [id number]   Team B- [id number]  For non-urgent matters e-mail [email or web address]    I spent 35 minutes face-to-face with this patient of which more than 50% of the time was spent counseling/coordinating care of this patient. Assessment/Plan: Mr. Cristina is a 57y M with PMH Schizoaffective D/O (Bipolar Type), HTN, HLD, Hypothyroidism, Daily Smoker, CVID/ Hypogammaglobulinemia (monthly IVIG - last 7/6/2023), T2DM, COPD, recent Lt gluteal MRSA abscess/cellulitis w/ MRSA bacteremia s/p Debridement 6/2023 presenting with worsening right buttock cellulitis. Pt found to be septic likely 2/2 buttock cellulitis. Pt admitted for further management, now s/p surgery I&D. Wound cx positive for MRSA.    Wound care surgery follow up for right inner buttock abscess s/p I&D by general surgery. General surgery requesting  re-eval after I&D. Patient pending discharge to home.   Right inner buttock abscess s/p I&D on 8/3 by general surgery   -wound cultures of right buttock grew moderate MRSA- receiving vancomycin as per ID  -Pending Immune Globulin 10% infusion today    -Pending discharge to home with follow up appointment  to be seen by wound care Attending Dr Diaz   -Appointment set up with Wound care MD Diaz on 8/15 at 0900 at [facility]. Instructions provided to patient, he wrote it down and was able to verbalized.   -Narrow wound with undermining at 6 o'clock extending 2.5cm, depth 3cm  -No puss express, small serosanguinous drainage. No odor.   -Wound bed unable to be visualized given narrow opening   -Erythema/severe edema resolved, only trace edema remains. No temperature changes.   -Pain improving   -Recommend packing with 1/4 inch Iodoform packing strip to wick drainage   -Topical recommendations: Clean wound and periwound skin with NS. Pat dry. Apply liquid barrier film to periwound skin, lightly pack depth and dead space with 1/4" Iodoform packing, leave at least 2 inches out to wick. Cover with ABD pad and support with disposable briefs. Change daily or prn if soile.d   At home, Change three times a week by VNS (Given location patient wont be able to pack wound independently). Patient instructed that If packing becomes soil or falls off then keep area clean and dry until VNS arrives.   -Patient will receive VNS for dressing changes   -No stool observed   -Ongoing counseling about importance of smoking cessation and keeping an optimal hygiene at home to prevent abscess recurrence. Patient verbalized understanding, endorses he changes his underwear daily after showering. Advised that after shower of dressing becomes soiled can remove and place Clean briefs, cover open ulcer with ABD pad until VNS arrives, patient verbalized understanding.    -Advise to use cotton underwear and change daily or as needed.       Additional Recs  All other healed sites right second finger, right arm, left leg, and bilateral buttocks, right perineum: Keep areas clean and dry.   Continue low airloss support surface.  Continue to turn and position every 2 hours with z-roz fluidized positioning device.  Continue use of Complete Cair pressure offloading boots.  Continue Nutritional management as per RD recommendations.    **Upon discharge follow up at outpatient [facility]. [address] [place] [address]. [phone]. Contact information provided to patient, appointment set up see above.     Will continue to follow while inpatient. Please reconsult earlier if needed   Thank you.    Discussed findings and plan with primary team MD, primary RN and   Discussed findings with wound care MD Diaz, agree with above treatment plan.   Mary Thomas, [facility]-BC, [facility]    pager #[phone] or available in MS teams     If after 4PM or before 7:30AM on Mon-Friday or weekend/holiday please contact general surgery for urgent matters.   Team A- [id number]   Team B- [id number]  For non-urgent matters e-mail [email or web address]    I spent 35 minutes face-to-face with this patient of which more than 50% of the time was spent counseling/coordinating care of this patient.

## 2023-08-09 LAB
ALBUMIN SERPL ELPH-MCNC: 3.8 G/DL — SIGNIFICANT CHANGE UP (ref 3.3–5)
ALP SERPL-CCNC: 128 U/L — HIGH (ref 40–120)
ALT FLD-CCNC: 24 U/L — SIGNIFICANT CHANGE UP (ref 4–41)
ANION GAP SERPL CALC-SCNC: 13 MMOL/L — SIGNIFICANT CHANGE UP (ref 7–14)
AST SERPL-CCNC: 15 U/L — SIGNIFICANT CHANGE UP (ref 4–40)
BILIRUB SERPL-MCNC: <0.2 MG/DL — SIGNIFICANT CHANGE UP (ref 0.2–1.2)
BUN SERPL-MCNC: 18 MG/DL — SIGNIFICANT CHANGE UP (ref 7–23)
CALCIUM SERPL-MCNC: 10 MG/DL — SIGNIFICANT CHANGE UP (ref 8.4–10.5)
CHLORIDE SERPL-SCNC: 97 MMOL/L — LOW (ref 98–107)
CO2 SERPL-SCNC: 25 MMOL/L — SIGNIFICANT CHANGE UP (ref 22–31)
CREAT SERPL-MCNC: 1.14 MG/DL — SIGNIFICANT CHANGE UP (ref 0.5–1.3)
EGFR: 76 ML/MIN/1.73M2 — SIGNIFICANT CHANGE UP
GLUCOSE BLDC GLUCOMTR-MCNC: 103 MG/DL — HIGH (ref 70–99)
GLUCOSE BLDC GLUCOMTR-MCNC: 106 MG/DL — HIGH (ref 70–99)
GLUCOSE BLDC GLUCOMTR-MCNC: 117 MG/DL — HIGH (ref 70–99)
GLUCOSE BLDC GLUCOMTR-MCNC: 129 MG/DL — HIGH (ref 70–99)
GLUCOSE SERPL-MCNC: 96 MG/DL — SIGNIFICANT CHANGE UP (ref 70–99)
HCT VFR BLD CALC: 36.1 % — LOW (ref 39–50)
HGB BLD-MCNC: 11.9 G/DL — LOW (ref 13–17)
MAGNESIUM SERPL-MCNC: 2 MG/DL — SIGNIFICANT CHANGE UP (ref 1.6–2.6)
MCHC RBC-ENTMCNC: 27.2 PG — SIGNIFICANT CHANGE UP (ref 27–34)
MCHC RBC-ENTMCNC: 33 GM/DL — SIGNIFICANT CHANGE UP (ref 32–36)
MCV RBC AUTO: 82.6 FL — SIGNIFICANT CHANGE UP (ref 80–100)
NRBC # BLD: 0 /100 WBCS — SIGNIFICANT CHANGE UP (ref 0–0)
NRBC # FLD: 0 K/UL — SIGNIFICANT CHANGE UP (ref 0–0)
PHOSPHATE SERPL-MCNC: 5.5 MG/DL — HIGH (ref 2.5–4.5)
PLATELET # BLD AUTO: 270 K/UL — SIGNIFICANT CHANGE UP (ref 150–400)
POTASSIUM SERPL-MCNC: 4.4 MMOL/L — SIGNIFICANT CHANGE UP (ref 3.5–5.3)
POTASSIUM SERPL-SCNC: 4.4 MMOL/L — SIGNIFICANT CHANGE UP (ref 3.5–5.3)
PROT SERPL-MCNC: 6.5 G/DL — SIGNIFICANT CHANGE UP (ref 6–8.3)
RBC # BLD: 4.37 M/UL — SIGNIFICANT CHANGE UP (ref 4.2–5.8)
RBC # FLD: 15.1 % — HIGH (ref 10.3–14.5)
SODIUM SERPL-SCNC: 135 MMOL/L — SIGNIFICANT CHANGE UP (ref 135–145)
VANCOMYCIN TROUGH SERPL-MCNC: 25.5 UG/ML — CRITICAL HIGH (ref 10–20)
WBC # BLD: 8.21 K/UL — SIGNIFICANT CHANGE UP (ref 3.8–10.5)
WBC # FLD AUTO: 8.21 K/UL — SIGNIFICANT CHANGE UP (ref 3.8–10.5)

## 2023-08-09 RX ORDER — DIPHENHYDRAMINE HCL 50 MG
50 CAPSULE ORAL ONCE
Refills: 0 | Status: COMPLETED | OUTPATIENT
Start: 2023-08-09 | End: 2023-08-09

## 2023-08-09 RX ORDER — IMMUNE GLOBULIN,GAMMA(IGG) 5 %
70 VIAL (ML) INTRAVENOUS ONCE
Refills: 0 | Status: COMPLETED | OUTPATIENT
Start: 2023-08-09 | End: 2023-08-09

## 2023-08-09 RX ORDER — DILTIAZEM HCL 120 MG
1 CAPSULE, EXT RELEASE 24 HR ORAL
Qty: 0 | Refills: 0 | DISCHARGE

## 2023-08-09 RX ADMIN — BUDESONIDE AND FORMOTEROL FUMARATE DIHYDRATE 2 PUFF(S): 160; 4.5 AEROSOL RESPIRATORY (INHALATION) at 22:50

## 2023-08-09 RX ADMIN — BUDESONIDE AND FORMOTEROL FUMARATE DIHYDRATE 2 PUFF(S): 160; 4.5 AEROSOL RESPIRATORY (INHALATION) at 09:34

## 2023-08-09 RX ADMIN — Medication 50 MILLIGRAM(S): at 15:52

## 2023-08-09 RX ADMIN — HEPARIN SODIUM 5000 UNIT(S): 5000 INJECTION INTRAVENOUS; SUBCUTANEOUS at 05:57

## 2023-08-09 RX ADMIN — HEPARIN SODIUM 5000 UNIT(S): 5000 INJECTION INTRAVENOUS; SUBCUTANEOUS at 12:23

## 2023-08-09 RX ADMIN — TIOTROPIUM BROMIDE 2 PUFF(S): 18 CAPSULE ORAL; RESPIRATORY (INHALATION) at 09:34

## 2023-08-09 RX ADMIN — Medication 50 MILLIGRAM(S): at 17:58

## 2023-08-09 RX ADMIN — ATORVASTATIN CALCIUM 40 MILLIGRAM(S): 80 TABLET, FILM COATED ORAL at 22:50

## 2023-08-09 RX ADMIN — Medication 650 MILLIGRAM(S): at 15:52

## 2023-08-09 RX ADMIN — Medication 50 MICROGRAM(S): at 05:57

## 2023-08-09 RX ADMIN — HEPARIN SODIUM 5000 UNIT(S): 5000 INJECTION INTRAVENOUS; SUBCUTANEOUS at 22:50

## 2023-08-09 RX ADMIN — Medication 300 MILLIGRAM(S): at 05:57

## 2023-08-09 RX ADMIN — Medication 233.3 GRAM(S): at 16:32

## 2023-08-09 NOTE — PROGRESS NOTE ADULT - ATTENDING COMMENTS
Pt seen and examined 8/9/23    57M with hx of schizoaffective disorder, CVID/hypogammaglobulinemia on monthly IVIG, HTN, DM2, p/w sepsis from Lt gluteal MRSA abscess/cellulitis – failed previous treatments c/b MRSA bacteremia s/p Debridement in May and June of this year, who presents with rt gluteal cellulitis.  Also found to have TASH which has responded to IVF hydration.    He overall feels well.  No acute pain.  No fevers, chills, or new symptoms otherwise.  He wants more food, but at the same time acknowledges the need to lose weight.    Monitoring rt gluteal region for any increased drainage, dark discoloration, and/or increased fluctuance.  US shows right buttock heterogeneous collection measuring 4.3 x 3.8 x 2.8 cm suspicious for a abscess and phlegmon.  Appreciate surgery, wound care, now with spontaneous drainage/ popped per the pt.    Surgery/wound care follow up.   Blood cxs 7/29/23: negative.  Vanco per ID. monitor trough. plan for po transition per house ID.     Hx of CVID on monthly Gammagard.  OP Immunologist: Dr. Mitchell Boxer ([facility])  Last Gammagard 7/6/23. Due 8/3, the infusion ordered. but pt been refusing stating he wants SD formula.   D/w the sister Brittany.  d/w Dr. Boxer, Pt requires Gammagard SD due to his underlying IgA deficiency (there is risk of infusion reaction with regular Gammagard).   d/w infusion center . confirmed that his dosing is 70 gram infused over 5-6 hrs.  d/w Pharmacy at [facility], have special order placed.  Pharmacy will let us know in regards to availability, hopefully for 8/9/23.   c/w qmonthly infusions after discharge.    Dc planning after Gammagard SD infusion.    Appreciate PGY-1! Pt seen and examined 8/9/23    57M with hx of schizoaffective disorder, CVID/hypogammaglobulinemia on monthly IVIG, HTN, DM2, p/w sepsis from Lt gluteal MRSA abscess/cellulitis – failed previous treatments c/b MRSA bacteremia s/p Debridement in May and June of this year, who presents with rt gluteal cellulitis.  Also found to have TASH which has responded to IVF hydration.    He overall feels well.  No acute pain.  No fevers, chills, or new symptoms otherwise.  He wants more food, but at the same time acknowledges the need to lose weight.    Monitoring rt gluteal region for any increased drainage, dark discoloration, and/or increased fluctuance.  US shows right buttock heterogeneous collection measuring 4.3 x 3.8 x 2.8 cm suspicious for a abscess and phlegmon.  Appreciate surgery, wound care, now with spontaneous drainage/ popped per the pt.    Surgery/wound care follow up.   Blood cxs 7/29/23: negative.  Monitor daily vanco troughs, once it drops below 15, start him on PO doxycycline as per ID final recs    Hx of CVID on monthly Gammagard.  OP Immunologist: Dr. Mitchell Boxer ([facility])  Last Gammagard 7/6/23. Due 8/3, the infusion ordered. but pt been refusing stating he wants SD formula.   D/w the sister Brittany.  d/w Dr. Boxer, Pt requires Gammagard SD due to his underlying IgA deficiency (there is risk of infusion reaction with regular Gammagard).   d/w infusion center . confirmed that his dosing is 70 gram infused over 5-6 hrs.  d/w Pharmacy at [facility], have special order placed.  Pharmacy will let us know in regards to availability, hopefully for 8/9/23.   c/w qmonthly infusions after discharge.    Dc planning after Gammagard SD infusion.    Appreciate PGY-1!

## 2023-08-09 NOTE — PROVIDER CONTACT NOTE (CRITICAL VALUE NOTIFICATION) - BACKGROUND
Patient admitted for cellulitis of buttock.   PMH of HTN, HLD, T2DM, CVID, Bipolar disorder.
Pt was admitted for cellulitis of the right buttock

## 2023-08-09 NOTE — PROGRESS NOTE ADULT - PROBLEM SELECTOR PLAN 2
Patient was scheduled for outpatient IVIG infusion on 8/3. Gammagard 70mg IV ordered, but patient reports he must take gammagard SD with benadryl pretreatment. Pharmacy has ordered this product to be given inpatient.  - pending 1x gammagard SD 70mg infusion Patient was scheduled for outpatient IVIG infusion on 8/3. Gammagard 70mg IV ordered, but patient reports he must take gammagard SD with benadryl pretreatment. Pharmacy has ordered this product to be given inpatient.  - pending 1x gammagard SD 70mg infusion with benadryl + tylenol pre-treatment Patient was scheduled for outpatient IVIG infusion on 8/3. Gammagard 70mg IV ordered, but patient reports he must take gammagard SD with benadryl pretreatment. Pharmacy has ordered this product to be given inpatient.  - pending 1x gammagard SD 70mg infusion with benadryl 50mg PO + tylenol 30min prior to treatment

## 2023-08-09 NOTE — PROGRESS NOTE ADULT - PROBLEM SELECTOR PLAN 1
Patient presenting with worsening buttock cellulitis; patient has presented with buttock SSTI 4x in the last 4 months. CT abd/pelvis w/o evidence of any drainable collection or subcutaneous gas. S/p vanc and cefepime in ED, now on IV vancomycin. ID and wound care following, appreciate recs. Vanc trough 10.2 on 7/31 and 11.8 on repeat, maintained 1750mg dose per ID. BCx neg at 72h. Ultrasound concerning for phlegmon and abscess. Now s/p gen surg I&D with improved pain/erythema/swelling, wound cx positive for MRSA. Vanc trough 8/7 was 21.8 so vanc dose was reduced to 1500mg.   - continue IV vancomycin 1500mg q12h  - ID following, appreciate recs: discharge with doxy 100mg q12 x14d  - wound care following, appreciate recs  - gen surg following, appreciate recs: f/u outpatient with Dr. Diaz within 1 week after discharge Patient presenting with worsening buttock cellulitis; patient has presented with buttock SSTI 4x in the last 4 months. CT abd/pelvis w/o evidence of any drainable collection or subcutaneous gas. S/p vanc and cefepime in ED, now on IV vancomycin. ID and wound care following, appreciate recs. Vanc trough 10.2 on 7/31 and 11.8 on repeat, maintained 1750mg dose per ID. BCx neg at 72h. Ultrasound concerning for phlegmon and abscess. Now s/p gen surg I&D with improved pain/erythema/swelling, wound cx positive for MRSA. Vanc trough 8/7 was 21.8 so vanc dose was reduced to 1500mg. Most recent vanc trough was 25.5 so vancomycin was discontinued today.  - ID following, appreciate recs: discharge with doxy 100mg q12 x14d  - wound care following, appreciate recs  - gen surg following, appreciate recs: f/u outpatient with Dr. Diaz within 1 week after discharge Patient presenting with worsening buttock cellulitis; patient has presented with buttock SSTI 4x in the last 4 months. CT abd/pelvis w/o evidence of any drainable collection or subcutaneous gas. S/p vanc and cefepime in ED, now on IV vancomycin. ID and wound care following, appreciate recs. Vanc trough 10.2 on 7/31 and 11.8 on repeat, maintained 1750mg dose per ID. BCx neg at 72h. Ultrasound concerning for phlegmon and abscess. Now s/p gen surg I&D with improved pain/erythema/swelling, wound cx positive for MRSA. Vanc trough 8/7 was 21.8 so vanc dose was reduced to 1500mg. Most recent vanc trough was 25.5 so vancomycin was discontinued today.  - Recheck vanc levels qday until <20, then consider restarting IV vanc vs. OP doxy regimen  - ID following, appreciate recs: discharge with doxy 100mg q12 x14d  - wound care following, appreciate recs  - gen surg following, appreciate recs: f/u outpatient with Dr. Diaz within 1 week after discharge

## 2023-08-09 NOTE — PROGRESS NOTE ADULT - PROBLEM SELECTOR PLAN 9
DVT ppx: heparin 5000u SQ q8h  Diet: dash/tlc, cc  Code status: full code  Dispo: pending clinical improvement DVT ppx: heparin 5000u SQ q8h  Diet: dash/tlc, cc  Code status: full code  Dispo: home pending gammagard SD infusion

## 2023-08-09 NOTE — PROGRESS NOTE ADULT - SUBJECTIVE AND OBJECTIVE BOX
DATE OF SERVICE: 08-09-23 @ 07:52    Patient is a 57y old  Male who presents with a chief complaint of sepsis (08 Aug 2023 12:27)      SUBJECTIVE / OVERNIGHT EVENTS:        MEDICATIONS  (STANDING):  atorvastatin 40 milliGRAM(s) Oral at bedtime  budesonide  80 MICROgram(s)/formoterol 4.5 MICROgram(s) Inhaler 2 Puff(s) Inhalation two times a day  dextrose 5%. 1000 milliLiter(s) (50 mL/Hr) IV Continuous <Continuous>  dextrose 50% Injectable 25 Gram(s) IV Push once  glucagon  Injectable 1 milliGRAM(s) IntraMuscular once  heparin   Injectable 5000 Unit(s) SubCutaneous every 8 hours  immune  globulin 10% (GAMMAGARD S/D) IVPB 70 Gram(s) IV Intermittent once  insulin lispro (ADMELOG) corrective regimen sliding scale   SubCutaneous three times a day before meals  insulin lispro (ADMELOG) corrective regimen sliding scale   SubCutaneous at bedtime  levothyroxine 50 MICROGram(s) Oral daily  tiotropium 2.5 MICROgram(s) Inhaler 2 Puff(s) Inhalation daily  vancomycin  IVPB 1500 milliGRAM(s) IV Intermittent every 12 hours    MEDICATIONS  (PRN):  acetaminophen     Tablet .. 650 milliGRAM(s) Oral every 6 hours PRN Temp greater or equal to 38C (100.4F), Mild Pain (1 - 3)  dextrose Oral Gel 15 Gram(s) Oral once PRN Blood Glucose LESS THAN 70 milliGRAM(s)/deciliter  lidocaine   4% Patch 1 Patch Transdermal once PRN moderate pain      Vital Signs Last 24 Hrs  T(C): 36.2 (09 Aug 2023 05:25), Max: 36.3 (08 Aug 2023 13:02)  T(F): 97.2 (09 Aug 2023 05:25), Max: 97.3 (08 Aug 2023 13:02)  HR: 62 (09 Aug 2023 05:25) (56 - 62)  BP: 116/65 (09 Aug 2023 05:25) (116/65 - 172/95)  BP(mean): --  RR: 18 (09 Aug 2023 05:25) (17 - 18)  SpO2: 100% (09 Aug 2023 05:25) (97% - 100%)    Parameters below as of 09 Aug 2023 05:25  Patient On (Oxygen Delivery Method): room air      CAPILLARY BLOOD GLUCOSE  POCT Blood Glucose.: 99 mg/dL (08 Aug 2023 21:40)  POCT Blood Glucose.: 92 mg/dL (08 Aug 2023 17:45)  POCT Blood Glucose.: 105 mg/dL (08 Aug 2023 12:33)  POCT Blood Glucose.: 103 mg/dL (08 Aug 2023 08:38)      PHYSICAL EXAM:  GENERAL: NAD, well-developed  HEAD:  Atraumatic, Normocephalic  EYES: EOMI, PERRLA, conjunctiva and sclera clear  NECK: Supple, No JVD  CHEST/LUNG: Clear to auscultation bilaterally; No wheeze  HEART: Regular rate and rhythm; No murmurs, rubs, or gallops  ABDOMEN: Soft, Nontender, Nondistended; Bowel sounds present  EXTREMITIES:  2+ Peripheral Pulses, No clubbing, cyanosis, or edema  PSYCH: AAOx3  NEUROLOGY: non-focal  SKIN: No rashes or lesions    LABS:      RADIOLOGY & ADDITIONAL TESTS: no new imaging    Imaging Personally Reviewed: n/a  Consultant(s) Notes Reviewed:  yes  Care Discussed with Consultants/Other Providers: yes   DATE OF SERVICE: 08-09-23 @ 07:52    Patient is a 57y old  Male who presents with a chief complaint of sepsis (08 Aug 2023 12:27)      SUBJECTIVE / OVERNIGHT EVENTS:  Patient reports feeling well, denies fevers/chills/n/v  Reports some pain when sitting directly on wound, but otherwise no pain  Discussed plan to discharge on doxycycline. Patient reports he has taken doxy previously for finger infection about 8mo ago  Patient has already arranged transportation for his 8/15 follow up appointment for wound care  Discussed plan for receiving gammagard SD: patient reports he is usually pre-treated with benadryl and tylenol.    MEDICATIONS  (STANDING):  atorvastatin 40 milliGRAM(s) Oral at bedtime  budesonide  80 MICROgram(s)/formoterol 4.5 MICROgram(s) Inhaler 2 Puff(s) Inhalation two times a day  dextrose 5%. 1000 milliLiter(s) (50 mL/Hr) IV Continuous <Continuous>  dextrose 50% Injectable 25 Gram(s) IV Push once  glucagon  Injectable 1 milliGRAM(s) IntraMuscular once  heparin   Injectable 5000 Unit(s) SubCutaneous every 8 hours  immune  globulin 10% (GAMMAGARD S/D) IVPB 70 Gram(s) IV Intermittent once  insulin lispro (ADMELOG) corrective regimen sliding scale   SubCutaneous three times a day before meals  insulin lispro (ADMELOG) corrective regimen sliding scale   SubCutaneous at bedtime  levothyroxine 50 MICROGram(s) Oral daily  tiotropium 2.5 MICROgram(s) Inhaler 2 Puff(s) Inhalation daily  vancomycin  IVPB 1500 milliGRAM(s) IV Intermittent every 12 hours    MEDICATIONS  (PRN):  acetaminophen     Tablet .. 650 milliGRAM(s) Oral every 6 hours PRN Temp greater or equal to 38C (100.4F), Mild Pain (1 - 3)  dextrose Oral Gel 15 Gram(s) Oral once PRN Blood Glucose LESS THAN 70 milliGRAM(s)/deciliter  lidocaine   4% Patch 1 Patch Transdermal once PRN moderate pain      Vital Signs Last 24 Hrs  T(C): 36.2 (09 Aug 2023 05:25), Max: 36.3 (08 Aug 2023 13:02)  T(F): 97.2 (09 Aug 2023 05:25), Max: 97.3 (08 Aug 2023 13:02)  HR: 62 (09 Aug 2023 05:25) (56 - 62)  BP: 116/65 (09 Aug 2023 05:25) (116/65 - 172/95)  BP(mean): --  RR: 18 (09 Aug 2023 05:25) (17 - 18)  SpO2: 100% (09 Aug 2023 05:25) (97% - 100%)    Parameters below as of 09 Aug 2023 05:25  Patient On (Oxygen Delivery Method): room air      CAPILLARY BLOOD GLUCOSE  POCT Blood Glucose.: 99 mg/dL (08 Aug 2023 21:40)  POCT Blood Glucose.: 92 mg/dL (08 Aug 2023 17:45)  POCT Blood Glucose.: 105 mg/dL (08 Aug 2023 12:33)  POCT Blood Glucose.: 103 mg/dL (08 Aug 2023 08:38)      PHYSICAL EXAM:  GENERAL: NAD, well-developed  HEAD:  Atraumatic, Normocephalic  EYES: conjunctiva and sclera clear  NECK: Supple  CHEST/LUNG: Clear to auscultation bilaterally; No wheeze  HEART: Regular rate and rhythm; No murmurs, rubs, or gallops  ABDOMEN: Soft, Nontender, Nondistended  EXTREMITIES:  No cyanosis or edema  PSYCH: AAOx3  NEUROLOGY: non-focal  SKIN: Wound packing in place within gluteal fold, appears clean and dry. No surrounding erythema, edema, or discharge noted.      LABS:                        11.9   8.21  )-----------( 270      ( 09 Aug 2023 05:30 )             36.1     08-09    135  |  97<L>  |  18  ----------------------------<  96  4.4   |  25  |  1.14    Ca    10.0      09 Aug 2023 05:30  Phos  5.5     08-09  Mg     2.00     08-09    TPro  6.5  /  Alb  3.8  /  TBili  <0.2  /  DBili  x   /  AST  15  /  ALT  24  /  AlkPhos  128<H>  08-09      Urinalysis Basic - ( 09 Aug 2023 05:30 )  Color: x / Appearance: x / SG: x / pH: x  Gluc: 96 mg/dL / Ketone: x  / Bili: x / Urobili: x   Blood: x / Protein: x / Nitrite: x   Leuk Esterase: x / RBC: x / WBC x   Sq Epi: x / Non Sq Epi: x / Bacteria: x        RADIOLOGY & ADDITIONAL TESTS: none    Imaging Personally Reviewed: n/a  Consultant(s) Notes Reviewed:  yes  Care Discussed with Consultants/Other Providers: yes

## 2023-08-09 NOTE — PROVIDER CONTACT NOTE (CRITICAL VALUE NOTIFICATION) - ACTION/TREATMENT ORDERED:
MD made aware. No new interventions ordered at this time.
MD said she is aware and to continue to monitor pt at this time

## 2023-08-09 NOTE — PROGRESS NOTE ADULT - PROBLEM SELECTOR PLAN 3
Patient with hx of chronic hyponatremia. Na 124 previously due to polydipsia. Also recently on bactrim, now discontinued. S/p 1 L NS in ED. Patient reports poor PO intake over the past week. Uosm 342, Farrukh <20. Patient Na stable in 130s 7/31-8/8.  - Continue to monitor Patient with hx of chronic hyponatremia. Na 124 previously due to polydipsia. Also recently on bactrim, now discontinued. S/p 1 L NS in ED. Patient reports poor PO intake over the past week. Uosm 342, Farrukh <20. Patient Na stable in 130s 7/31-8/9.  - Continue to monitor

## 2023-08-10 ENCOUNTER — NON-APPOINTMENT (OUTPATIENT)
Age: 58
End: 2023-08-10

## 2023-08-10 VITALS
TEMPERATURE: 98 F | DIASTOLIC BLOOD PRESSURE: 63 MMHG | OXYGEN SATURATION: 95 % | HEART RATE: 60 BPM | SYSTOLIC BLOOD PRESSURE: 115 MMHG | RESPIRATION RATE: 18 BRPM

## 2023-08-10 LAB
ALBUMIN SERPL ELPH-MCNC: 4.2 G/DL — SIGNIFICANT CHANGE UP (ref 3.3–5)
ALP SERPL-CCNC: 124 U/L — HIGH (ref 40–120)
ALT FLD-CCNC: 22 U/L — SIGNIFICANT CHANGE UP (ref 4–41)
ANION GAP SERPL CALC-SCNC: 10 MMOL/L — SIGNIFICANT CHANGE UP (ref 7–14)
AST SERPL-CCNC: 16 U/L — SIGNIFICANT CHANGE UP (ref 4–40)
BILIRUB SERPL-MCNC: 0.2 MG/DL — SIGNIFICANT CHANGE UP (ref 0.2–1.2)
BUN SERPL-MCNC: 19 MG/DL — SIGNIFICANT CHANGE UP (ref 7–23)
CALCIUM SERPL-MCNC: 10.2 MG/DL — SIGNIFICANT CHANGE UP (ref 8.4–10.5)
CHLORIDE SERPL-SCNC: 98 MMOL/L — SIGNIFICANT CHANGE UP (ref 98–107)
CO2 SERPL-SCNC: 25 MMOL/L — SIGNIFICANT CHANGE UP (ref 22–31)
CREAT SERPL-MCNC: 1.13 MG/DL — SIGNIFICANT CHANGE UP (ref 0.5–1.3)
EGFR: 77 ML/MIN/1.73M2 — SIGNIFICANT CHANGE UP
GLUCOSE BLDC GLUCOMTR-MCNC: 96 MG/DL — SIGNIFICANT CHANGE UP (ref 70–99)
GLUCOSE BLDC GLUCOMTR-MCNC: 97 MG/DL — SIGNIFICANT CHANGE UP (ref 70–99)
GLUCOSE SERPL-MCNC: 97 MG/DL — SIGNIFICANT CHANGE UP (ref 70–99)
HCT VFR BLD CALC: 36.9 % — LOW (ref 39–50)
HGB BLD-MCNC: 12.2 G/DL — LOW (ref 13–17)
MAGNESIUM SERPL-MCNC: 2 MG/DL — SIGNIFICANT CHANGE UP (ref 1.6–2.6)
MCHC RBC-ENTMCNC: 27 PG — SIGNIFICANT CHANGE UP (ref 27–34)
MCHC RBC-ENTMCNC: 33.1 GM/DL — SIGNIFICANT CHANGE UP (ref 32–36)
MCV RBC AUTO: 81.6 FL — SIGNIFICANT CHANGE UP (ref 80–100)
NRBC # BLD: 0 /100 WBCS — SIGNIFICANT CHANGE UP (ref 0–0)
NRBC # FLD: 0 K/UL — SIGNIFICANT CHANGE UP (ref 0–0)
PHOSPHATE SERPL-MCNC: 5.3 MG/DL — HIGH (ref 2.5–4.5)
PLATELET # BLD AUTO: 278 K/UL — SIGNIFICANT CHANGE UP (ref 150–400)
POTASSIUM SERPL-MCNC: 4.6 MMOL/L — SIGNIFICANT CHANGE UP (ref 3.5–5.3)
POTASSIUM SERPL-SCNC: 4.6 MMOL/L — SIGNIFICANT CHANGE UP (ref 3.5–5.3)
PROT SERPL-MCNC: 8 G/DL — SIGNIFICANT CHANGE UP (ref 6–8.3)
RBC # BLD: 4.52 M/UL — SIGNIFICANT CHANGE UP (ref 4.2–5.8)
RBC # FLD: 15.2 % — HIGH (ref 10.3–14.5)
SODIUM SERPL-SCNC: 133 MMOL/L — LOW (ref 135–145)
VANCOMYCIN TROUGH SERPL-MCNC: 13.4 UG/ML — SIGNIFICANT CHANGE UP (ref 10–20)
WBC # BLD: 5.64 K/UL — SIGNIFICANT CHANGE UP (ref 3.8–10.5)
WBC # FLD AUTO: 5.64 K/UL — SIGNIFICANT CHANGE UP (ref 3.8–10.5)

## 2023-08-10 RX ADMIN — Medication 50 MICROGRAM(S): at 06:34

## 2023-08-10 RX ADMIN — HEPARIN SODIUM 5000 UNIT(S): 5000 INJECTION INTRAVENOUS; SUBCUTANEOUS at 06:34

## 2023-08-10 RX ADMIN — TIOTROPIUM BROMIDE 2 PUFF(S): 18 CAPSULE ORAL; RESPIRATORY (INHALATION) at 09:28

## 2023-08-10 RX ADMIN — BUDESONIDE AND FORMOTEROL FUMARATE DIHYDRATE 2 PUFF(S): 160; 4.5 AEROSOL RESPIRATORY (INHALATION) at 09:27

## 2023-08-10 NOTE — PROGRESS NOTE ADULT - PROBLEM SELECTOR PLAN 6
Held home diltiazem in setting of sepsis  - F/u outpatient for restarting due to low BPs while inpatient

## 2023-08-10 NOTE — PROGRESS NOTE ADULT - SUBJECTIVE AND OBJECTIVE BOX
DATE OF SERVICE: 08-10-23 @ 09:24    Patient is a 57y old  Male who presents with a chief complaint of sepsis (09 Aug 2023 07:52)      SUBJECTIVE / OVERNIGHT EVENTS:  Patient feel "fantastic," denies fever or any other side effects from IVIG infusion yesterday  Discussed discharge today.   Patient expressed relief that wound resolved in 2 weeks versus 1mo with previous gluteal wounds    MEDICATIONS  (STANDING):  atorvastatin 40 milliGRAM(s) Oral at bedtime  budesonide  80 MICROgram(s)/formoterol 4.5 MICROgram(s) Inhaler 2 Puff(s) Inhalation two times a day  dextrose 50% Injectable 25 Gram(s) IV Push once  glucagon  Injectable 1 milliGRAM(s) IntraMuscular once  heparin   Injectable 5000 Unit(s) SubCutaneous every 8 hours  insulin lispro (ADMELOG) corrective regimen sliding scale   SubCutaneous three times a day before meals  insulin lispro (ADMELOG) corrective regimen sliding scale   SubCutaneous at bedtime  levothyroxine 50 MICROGram(s) Oral daily  tiotropium 2.5 MICROgram(s) Inhaler 2 Puff(s) Inhalation daily    MEDICATIONS  (PRN):  acetaminophen     Tablet .. 650 milliGRAM(s) Oral every 6 hours PRN Temp greater or equal to 38C (100.4F), Mild Pain (1 - 3)  dextrose Oral Gel 15 Gram(s) Oral once PRN Blood Glucose LESS THAN 70 milliGRAM(s)/deciliter  lidocaine   4% Patch 1 Patch Transdermal once PRN moderate pain      Vital Signs Last 24 Hrs  T(C): 36.2 (10 Aug 2023 08:06), Max: 36.4 (09 Aug 2023 16:31)  T(F): 97.2 (10 Aug 2023 08:06), Max: 97.5 (09 Aug 2023 16:31)  HR: 56 (10 Aug 2023 08:06) (54 - 56)  BP: 117/67 (10 Aug 2023 08:06) (117/67 - 154/80)  BP(mean): --  RR: 17 (10 Aug 2023 08:06) (17 - 17)  SpO2: 96% (10 Aug 2023 08:06) (96% - 98%)    Parameters below as of 10 Aug 2023 08:06  Patient On (Oxygen Delivery Method): room air      CAPILLARY BLOOD GLUCOSE  POCT Blood Glucose.: 97 mg/dL (10 Aug 2023 08:30)  POCT Blood Glucose.: 117 mg/dL (09 Aug 2023 22:59)  POCT Blood Glucose.: 129 mg/dL (09 Aug 2023 18:03)  POCT Blood Glucose.: 103 mg/dL (09 Aug 2023 12:21)      PHYSICAL EXAM:  GENERAL: NAD, well-developed  HEAD:  Atraumatic, Normocephalic  EYES: conjunctiva and sclera clear  NECK: Supple, No JVD  CHEST/LUNG: Clear to auscultation bilaterally; No wheeze  HEART: Regular rate and rhythm; No murmurs, rubs, or gallops  ABDOMEN: Soft, Nontender, Nondistended  EXTREMITIES:  No cyanosis or edema  PSYCH: AAOx3  NEUROLOGY: non-focal  SKIN: Healing wound on R side of gluteal fold without surrounding erythema, edema, or discharge.    LABS:                        12.2   5.64  )-----------( 278      ( 10 Aug 2023 06:50 )             36.9     08-10    133<L>  |  98  |  19  ----------------------------<  97  4.6   |  25  |  1.13    Ca    10.2      10 Aug 2023 06:50  Phos  5.3     08-10  Mg     2.00     08-10    TPro  8.0  /  Alb  4.2  /  TBili  0.2  /  DBili  x   /  AST  16  /  ALT  22  /  AlkPhos  124<H>  08-10      Urinalysis Basic - ( 10 Aug 2023 06:50 )  Color: x / Appearance: x / SG: x / pH: x  Gluc: 97 mg/dL / Ketone: x  / Bili: x / Urobili: x   Blood: x / Protein: x / Nitrite: x   Leuk Esterase: x / RBC: x / WBC x   Sq Epi: x / Non Sq Epi: x / Bacteria: x        RADIOLOGY & ADDITIONAL TESTS: no new imaging    Imaging Personally Reviewed: n/a  Consultant(s) Notes Reviewed:  yes  Care Discussed with Consultants/Other Providers: yes

## 2023-08-10 NOTE — PROGRESS NOTE ADULT - PROBLEM SELECTOR PROBLEM 1
Sepsis due to cellulitis

## 2023-08-10 NOTE — PROGRESS NOTE ADULT - PROBLEM SELECTOR PLAN 9
DVT ppx: heparin 5000u SQ q8h  Diet: dash/tlc, cc  Code status: full code  Dispo: home pending gammagard SD infusion

## 2023-08-10 NOTE — PROGRESS NOTE ADULT - PROBLEM SELECTOR PLAN 3
Patient with hx of chronic hyponatremia. Na 124 previously due to polydipsia. Also recently on bactrim, now discontinued. S/p 1 L NS in ED. Patient reports poor PO intake over the past week. Uosm 342, Farrukh <20. Patient Na stable in 130s 7/31-8/10.  - Continue to monitor

## 2023-08-10 NOTE — PROGRESS NOTE ADULT - PROVIDER SPECIALTY LIST ADULT
Infectious Disease
Surgery
Wound Care
Wound Care
Surgery
Internal Medicine

## 2023-08-10 NOTE — PROGRESS NOTE ADULT - ATTENDING COMMENTS
Pt seen and examined 8/10/23    57M with hx of schizoaffective disorder, CVID/hypogammaglobulinemia on monthly IVIG, HTN, DM2, p/w sepsis from Lt gluteal MRSA abscess/cellulitis – failed previous treatments c/b MRSA bacteremia s/p Debridement in May and June of this year, who presents with rt gluteal cellulitis.  Also found to have TASH which has responded to IVF hydration.    He overall feels well.  No acute pain.  No fevers, chills, or new symptoms otherwise.  He wants more food, but at the same time acknowledges the need to lose weight.    Monitoring rt gluteal region for any increased drainage, dark discoloration, and/or increased fluctuance.  US shows right buttock heterogeneous collection measuring 4.3 x 3.8 x 2.8 cm suspicious for a abscess and phlegmon.  Appreciate surgery, wound care, now with spontaneous drainage/ popped per the pt.    Surgery/wound care follow up.   Blood cxs 7/29/23: negative.  Monitor daily vanco troughs, once it drops below 15, start him on PO doxycycline as per ID final recs    Hx of CVID on monthly Gammagard.  OP Immunologist: Dr. Mitchell Boxer ([facility])  Last Gammagard 7/6/23. Due 8/3, the infusion ordered. but pt been refusing stating he wants SD formula.   D/w the sister Brittany.  d/w Dr. Boxer, Pt requires Gammagard SD due to his underlying IgA deficiency (there is risk of infusion reaction with regular Gammagard).   d/w infusion center . confirmed that his dosing is 70 gram infused over 5-6 hrs.  S/p infusion of gammagard 8/9/23 without incident    Dc planning to home 8/10/23    Appreciate PGY-1!

## 2023-08-14 NOTE — PROGRESS NOTE ADULT - PROBLEM/PLAN-3
DISPLAY PLAN FREE TEXT
(3) 3 to less than 7 years old

## 2023-08-15 ENCOUNTER — APPOINTMENT (OUTPATIENT)
Dept: WOUND CARE | Facility: CLINIC | Age: 58
End: 2023-08-15
Payer: MEDICARE

## 2023-08-15 ENCOUNTER — OUTPATIENT (OUTPATIENT)
Dept: OUTPATIENT SERVICES | Facility: HOSPITAL | Age: 58
LOS: 1 days | End: 2023-08-15
Payer: MEDICARE

## 2023-08-15 VITALS
BODY MASS INDEX: 30.8 KG/M2 | WEIGHT: 240 LBS | HEIGHT: 74 IN | SYSTOLIC BLOOD PRESSURE: 155 MMHG | OXYGEN SATURATION: 98 % | HEART RATE: 68 BPM | DIASTOLIC BLOOD PRESSURE: 95 MMHG | TEMPERATURE: 97.5 F

## 2023-08-15 DIAGNOSIS — K08.199 COMPLETE LOSS OF TEETH DUE TO OTHER SPECIFIED CAUSE, UNSPECIFIED CLASS: Chronic | ICD-10-CM

## 2023-08-15 DIAGNOSIS — L02.91 CUTANEOUS ABSCESS, UNSPECIFIED: ICD-10-CM

## 2023-08-15 DIAGNOSIS — J34.2 DEVIATED NASAL SEPTUM: Chronic | ICD-10-CM

## 2023-08-15 PROCEDURE — 99213 OFFICE O/P EST LOW 20 MIN: CPT

## 2023-08-15 PROCEDURE — G0463: CPT

## 2023-08-15 NOTE — PHYSICAL EXAM
[Normal Breath Sounds] : Normal breath sounds [Skin Ulcer] : ulcer [Calm] : calm [de-identified] : NAD [de-identified] : supple [de-identified] : equal chest rise [Please See PDF for [employer]] : Please See PDF for [employer].

## 2023-08-15 NOTE — ASSESSMENT
[FreeTextEntry1] : Wound Assessment and Plan: 57M schizoaffective d/o, CVID, bronchiectasis, HTN, DM, previous LLE  cellulitis/abscess and MRSA bacteremia, recent admit OSH ([place]) for R buttock  wound w/ cellulitis, s/p debridement, now healing well The patient presents with a healed wound to the right gluteus, and open left leg ulcer - No clinical sign of infection Recommendation: Apply lidocaine or topical anesthetic if needed to reduce pain upon washing the wound. Wash wound with ---- Dove skin sensitive soap and clean water  Apply ---- foam on left leg Cavilon on right gluteus Leg elevation as tolerated Encouraged ambulation or exercise. Optimization of nutrition. Offloading to the wound site.  Supplies ordered Return in 3 weeks  8/15/23 Patient presents for follow up of buttock and leg wound. Patient recently hospitalized for cellulitis of the buttock.  On exam: Buttock wound is closed. Skin is thickened and hyperpigmented. Cleansed with Vashe Applied Calmoseptine LLE wound Wound is open Wound bed is red, minimal serosanguinous drainage present, no odor present. Periwound intact with no erythema. s/p mechanical debridement applied Medi Honey/foam dressing

## 2023-08-15 NOTE — DATA REVIEWED
[FreeTextEntry1] : 06-Jun-2023 \par  Admission Date	06-May-2023 22:55 \par  Reason for Admission	MRSA bacteremia \par  Hospital Course	 \par  57M schizoaffective d/o, CVID, bronchiectasis, HTN, DM, previous LLE \par  cellulitis/abscess and MRSA bacteremia, recent admit OSH ([place]) for R buttock \par  wound w/ cellulitis though left AMA on clindamycin, a/w worsened sx (CT e/o \par  cellulitis w/o abscess) s/p debridement of necrotic skin/fat (5/10) w/ Cx+ MRSA \par  (and MRSA bacteremia) initiated on vancomyin (ID c/s) course to end tentatively \par  6/5, course c/b hypoNa that improved with 1.2L fluid restriction and salt tabs. \par  BH titrating psychiatric regimen and initially rec'd transfer to [facility] after \par

## 2023-08-15 NOTE — DATA REVIEWED
[FreeTextEntry1] : 06-Jun-2023 \par  Admission Date	06-May-2023 22:55 \par  Reason for Admission	MRSA bacteremia \par  Hospital Course	 \par  57M schizoaffective d/o, CVID, bronchiectasis, HTN, DM, previous LLE \par  cellulitis/abscess and MRSA bacteremia, recent admit OSH ([place]) for R buttock \par  wound w/ cellulitis though left AMA on clindamycin, a/w worsened sx (CT e/o \par  cellulitis w/o abscess) s/p debridement of necrotic skin/fat (5/10) w/ Cx+ MRSA \par  (and MRSA bacteremia) initiated on vancomyin (ID c/s) course to end tentatively \par  6/5, course c/b hypoNa that improved with 1.2L fluid restriction and salt tabs. \par  BH titrating psychiatric regimen and initially rec'd transfer to [facility] after \par

## 2023-08-15 NOTE — DISCUSSION/SUMMARY
[FreeTextEntry1] : 7/27/23\par  Returned patient's phone call concerning a boil on his buttock. Patient is going to the ER today to be seen for the boil. Patient states he will need IV ABX.

## 2023-08-15 NOTE — ASSESSMENT
[FreeTextEntry1] : Wound Assessment and Plan: 57M schizoaffective d/o, CVID, bronchiectasis, HTN, DM, previous LLE  cellulitis/abscess and MRSA bacteremia, recent admit OSH ([place]) for R buttock  wound w/ cellulitis, s/p debridement, now healing well The patient presents with a healed wound to the right gluteus, and open left leg ulcer - No clinical sign of infection Recommendation: Apply lidocaine or topical anesthetic if needed to reduce pain upon washing the wound. Wash wound with ---- Dove skin sensitive soap and clean water  Apply ---- foam on left leg Cavilon on right gluteus Leg elevation as tolerated Encouraged ambulation or exercise. Optimization of nutrition. Offloading to the wound site.  Supplies ordered Return in 3 weeks  8/15/23 Patient presents for follow up of buttock and leg wound. Patient recently hospitalized for cellulitis of the buttock.  On exam: Buttock wound is closed. Skin is thickened and hyperpigmented. Cleansed with Vashe Applied Calmoseptine LLE wound Wound is open Wound bed is red, minimal serosanguinous drainage present, no odor present. Periwound intact with no erythema. s/p mechanical debridement applied Medi Honey/foam dressing

## 2023-08-15 NOTE — PHYSICAL EXAM
[Normal Breath Sounds] : Normal breath sounds [Skin Ulcer] : ulcer [Calm] : calm [de-identified] : NAD [de-identified] : supple [de-identified] : equal chest rise [Please See PDF for [employer]] : Please See PDF for [employer].

## 2023-08-15 NOTE — PLAN
[FreeTextEntry1] : 8/15/23 Plan: Buttock wound:  Wash with soap and water pat dry and apply Calmoseptine LLE wound: Wash with soap and water Apply Medi Honey and cove with a foam dressing. RTO in 3 weeks.

## 2023-08-31 ENCOUNTER — APPOINTMENT (OUTPATIENT)
Dept: RHEUMATOLOGY | Facility: CLINIC | Age: 58
End: 2023-08-31

## 2023-09-05 PROCEDURE — 99214 OFFICE O/P EST MOD 30 MIN: CPT

## 2023-09-07 ENCOUNTER — OUTPATIENT (OUTPATIENT)
Dept: OUTPATIENT SERVICES | Facility: HOSPITAL | Age: 58
LOS: 1 days | End: 2023-09-07
Payer: MEDICARE

## 2023-09-07 ENCOUNTER — APPOINTMENT (OUTPATIENT)
Dept: WOUND CARE | Facility: CLINIC | Age: 58
End: 2023-09-07
Payer: MEDICARE

## 2023-09-07 VITALS
HEIGHT: 74 IN | WEIGHT: 240 LBS | HEART RATE: 78 BPM | DIASTOLIC BLOOD PRESSURE: 68 MMHG | SYSTOLIC BLOOD PRESSURE: 122 MMHG | BODY MASS INDEX: 30.8 KG/M2 | TEMPERATURE: 98.1 F
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
## 2023-09-07 DIAGNOSIS — K08.199 COMPLETE LOSS OF TEETH DUE TO OTHER SPECIFIED CAUSE, UNSPECIFIED CLASS: Chronic | ICD-10-CM
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
## 2023-09-07 DIAGNOSIS — F32.A DEPRESSION, UNSPECIFIED: ICD-10-CM

## 2023-09-07 DIAGNOSIS — J34.2 DEVIATED NASAL SEPTUM: Chronic | ICD-10-CM

## 2023-09-07 DIAGNOSIS — S31.819A UNSPECIFIED OPEN WOUND OF RIGHT BUTTOCK, INITIAL ENCOUNTER: ICD-10-CM

## 2023-09-07 PROCEDURE — G0463: CPT

## 2023-09-07 PROCEDURE — 99213 OFFICE O/P EST LOW 20 MIN: CPT

## 2023-09-07 NOTE — ASSESSMENT
[FreeTextEntry1] : Wound Assessment and Plan: 57M schizoaffective d/o, CVID, bronchiectasis, HTN, DM, previous LLE  cellulitis/abscess and MRSA bacteremia, recent admit OSH ([place]) for R buttock  wound w/ cellulitis, s/p debridement, now healing well The patient presents with a healed wound to the right gluteus, and open left leg ulcer - No clinical sign of infection Recommendation: Apply lidocaine or topical anesthetic if needed to reduce pain upon washing the wound. Wash wound with ---- Dove skin sensitive soap and clean water  Apply ---- foam on left leg Cavilon on right gluteus Leg elevation as tolerated Encouraged ambulation or exercise. Optimization of nutrition. Offloading to the wound site.  Supplies ordered Return in 3 weeks  8/15/23 Patient presents for follow up of buttock and leg wound. Patient recently hospitalized for cellulitis of the buttock.  On exam: Buttock wound is closed. Skin is thickened and hyperpigmented. Cleansed with Vashe Applied Calmoseptine LLE wound Wound is open Wound bed is red, minimal serosanguinous drainage present, no odor present. Periwound intact with no erythema. s/p mechanical debridement applied Medi Honey/foam dressing  9/7/23 Patient presents for follow up of buttock wound.   On exam: Buttock wound is closed. Skin is thickened and hyperpigmented.

## 2023-09-07 NOTE — PHYSICAL EXAM
[Normal Breath Sounds] : Normal breath sounds [Skin Ulcer] : ulcer [Calm] : calm [de-identified] : NAD [de-identified] : supple [de-identified] : equal chest rise [Please See PDF for [employer]] : Please See PDF for [employer].

## 2023-09-07 NOTE — PLAN
[FreeTextEntry1] : 8/15/23 Plan: Buttock wound:  Wash with soap and water pat dry and apply Calmoseptine LLE wound: Wash with soap and water Apply Medi Honey and cove with a foam dressing. RTO in 3 weeks.  9/7/23 Plan: Wash area daily with sopa and water Apply Calmoseptine as needed. RTO as needed

## 2023-09-07 NOTE — DATA REVIEWED
[FreeTextEntry1] : 06-Jun-2023 \par  Admission Date	06-May-2023 22:55 \par  Reason for Admission	MRSA bacteremia \par  Hospital Course	 \par  57M schizoaffective d/o, CVID, bronchiectasis, HTN, DM, previous LLE \par  cellulitis/abscess and MRSA bacteremia, recent admit OSH ([place]) for R buttock \par  wound w/ cellulitis though left AMA on clindamycin, a/w worsened sx (CT e/o \par  cellulitis w/o abscess) s/p debridement of necrotic skin/fat (5/10) w/ Cx+ MRSA \par  (and MRSA bacteremia) initiated on vancomyin (ID c/s) course to end tentatively \par  6/5, course c/b hypoNa that improved with 1.2L fluid restriction and salt tabs. \par  BH titrating psychiatric regimen and initially rec'd transfer to [facility] after \par

## 2023-09-11 DIAGNOSIS — D83.9 COMMON VARIABLE IMMUNODEFICIENCY, UNSPECIFIED: ICD-10-CM

## 2023-09-11 DIAGNOSIS — L02.91 CUTANEOUS ABSCESS, UNSPECIFIED: ICD-10-CM

## 2023-09-12 ENCOUNTER — APPOINTMENT (OUTPATIENT)
Dept: RHEUMATOLOGY | Facility: CLINIC | Age: 58
End: 2023-09-12
Payer: MEDICARE

## 2023-09-12 VITALS
TEMPERATURE: 98 F | RESPIRATION RATE: 16 BRPM | HEART RATE: 72 BPM | DIASTOLIC BLOOD PRESSURE: 89 MMHG | OXYGEN SATURATION: 97 % | SYSTOLIC BLOOD PRESSURE: 151 MMHG

## 2023-09-12 VITALS
OXYGEN SATURATION: 96 % | DIASTOLIC BLOOD PRESSURE: 89 MMHG | SYSTOLIC BLOOD PRESSURE: 158 MMHG | HEART RATE: 64 BPM | RESPIRATION RATE: 16 BRPM

## 2023-09-12 PROBLEM — S31.819A WOUND OF RIGHT BUTTOCK: Status: ACTIVE | Noted: 2023-06-29

## 2023-09-12 PROCEDURE — 96366 THER/PROPH/DIAG IV INF ADDON: CPT

## 2023-09-12 PROCEDURE — 96365 THER/PROPH/DIAG IV INF INIT: CPT

## 2023-09-12 RX ORDER — ACETAMINOPHEN 325 MG/1
325 TABLET ORAL
Qty: 0 | Refills: 0 | Status: COMPLETED
Start: 2023-04-05

## 2023-09-12 RX ORDER — DIPHENHYDRAMINE HCL 25 MG/1
25 TABLET ORAL
Qty: 0 | Refills: 0 | Status: COMPLETED
Start: 2023-04-05

## 2023-09-12 RX ORDER — IMMUNE GLOBULIN INTRAVENOUS (HUMAN) 10 G
10 KIT INTRAVENOUS
Qty: 0 | Refills: 0 | Status: COMPLETED | OUTPATIENT
Start: 2023-05-26

## 2023-09-13 NOTE — PATIENT PROFILE ADULT - NSTOBACCO TYPE_GEN_A_CORE_RD
Cigarettes Terbinafine Counseling: Patient counseling regarding adverse effects of terbinafine including but not limited to headache, diarrhea, rash, upset stomach, liver function test abnormalities, itching, taste/smell disturbance, nausea, abdominal pain, and flatulence.  There is a rare possibility of liver failure that can occur when taking terbinafine.  The patient understands that a baseline LFT and kidney function test may be required. The patient verbalized understanding of the proper use and possible adverse effects of terbinafine.  All of the patient's questions and concerns were addressed.

## 2023-09-14 NOTE — PROGRESS NOTE ADULT - SUBJECTIVE AND OBJECTIVE BOX
Surgery Progress Note     Subjective/24hour Events:   Patient seen and examined. No new complaints.      Vital Signs:  Vital Signs Last 24 Hrs  T(C): 36.6 (08 May 2023 14:45), Max: 37.3 (07 May 2023 22:29)  T(F): 97.9 (08 May 2023 14:45), Max: 99.1 (07 May 2023 22:29)  HR: 85 (08 May 2023 14:45) (85 - 101)  BP: 142/83 (08 May 2023 14:45) (142/83 - 186/97)  BP(mean): --  RR: 17 (08 May 2023 14:45) (17 - 20)  SpO2: 91% (08 May 2023 14:45) (90% - 94%)    Parameters below as of 08 May 2023 14:45  Patient On (Oxygen Delivery Method): nasal cannula        CAPILLARY BLOOD GLUCOSE      POCT Blood Glucose.: 127 mg/dL (08 May 2023 12:22)  POCT Blood Glucose.: 117 mg/dL (08 May 2023 08:50)  POCT Blood Glucose.: 118 mg/dL (07 May 2023 22:12)  POCT Blood Glucose.: 90 mg/dL (07 May 2023 18:06)      I&O's Detail    07 May 2023 07:01  -  08 May 2023 07:00  --------------------------------------------------------  IN:    IV PiggyBack: 300 mL    Oral Fluid: 4037 mL  Total IN: 4337 mL    OUT:    Voided (mL): 2000 mL  Total OUT: 2000 mL    Total NET: 2337 mL      08 May 2023 07:01  -  08 May 2023 15:46  --------------------------------------------------------  IN:    IV PiggyBack: 250 mL    Oral Fluid: 2200 mL  Total IN: 2450 mL    OUT:    Voided (mL): 1050 mL  Total OUT: 1050 mL    Total NET: 1400 mL            Physical Exam:  Gen: NAD  CV: Regular rate when evaluated  Resp: Nonlabored breathing with nasal cannula in place  Buttock: Right buttock wound approximately 5x5cm with areas of fibrinous exudate and no purulent drainage noted, surrounding erythema and induration, off midlineExt: Warm        Labs:    05-08    127<L>  |  88<L>  |  7   ----------------------------<  104<H>  4.3   |  28  |  0.69    Ca    9.1      08 May 2023 05:55  Phos  3.7     05-08  Mg     2.20     05-08    TPro  6.6  /  Alb  3.4  /  TBili  0.2  /  DBili  x   /  AST  24  /  ALT  25  /  AlkPhos  194<H>  05-08    LIVER FUNCTIONS - ( 08 May 2023 05:55 )  Alb: 3.4 g/dL / Pro: 6.6 g/dL / ALK PHOS: 194 U/L / ALT: 25 U/L / AST: 24 U/L / GGT: x                                 11.7   10.98 )-----------( 275      ( 08 May 2023 05:55 )             35.1          Detail Level: Detailed

## 2023-09-20 ENCOUNTER — APPOINTMENT (OUTPATIENT)
Dept: ALLERGY | Facility: CLINIC | Age: 58
End: 2023-09-20
Payer: MEDICARE

## 2023-09-20 VITALS
HEART RATE: 72 BPM | OXYGEN SATURATION: 98 % | TEMPERATURE: 98.4 F | DIASTOLIC BLOOD PRESSURE: 94 MMHG | SYSTOLIC BLOOD PRESSURE: 170 MMHG

## 2023-09-20 DIAGNOSIS — S31.819A UNSPECIFIED OPEN WOUND OF RIGHT BUTTOCK, INITIAL ENCOUNTER: ICD-10-CM

## 2023-09-20 DIAGNOSIS — F32.A DEPRESSION, UNSPECIFIED: ICD-10-CM

## 2023-09-20 DIAGNOSIS — D83.9 COMMON VARIABLE IMMUNODEFICIENCY, UNSPECIFIED: ICD-10-CM

## 2023-09-20 PROCEDURE — 90686 IIV4 VACC NO PRSV 0.5 ML IM: CPT

## 2023-09-20 PROCEDURE — G0008: CPT

## 2023-09-20 PROCEDURE — 99214 OFFICE O/P EST MOD 30 MIN: CPT | Mod: 25

## 2023-10-02 NOTE — PROGRESS NOTE ADULT - PROBLEM SELECTOR PLAN 10
Continue statin
(E4) spontaneous

## 2023-10-11 RX ORDER — SULFAMETHOXAZOLE AND TRIMETHOPRIM 800; 160 MG/1; MG/1
800-160 TABLET ORAL TWICE DAILY
Qty: 28 | Refills: 0 | Status: ACTIVE | COMMUNITY
Start: 2023-07-11 | End: 1900-01-01

## 2023-10-12 ENCOUNTER — APPOINTMENT (OUTPATIENT)
Dept: RHEUMATOLOGY | Facility: CLINIC | Age: 58
End: 2023-10-12
Payer: MEDICARE

## 2023-10-12 VITALS
HEART RATE: 74 BPM | TEMPERATURE: 97.9 F | RESPIRATION RATE: 16 BRPM | OXYGEN SATURATION: 95 % | SYSTOLIC BLOOD PRESSURE: 164 MMHG | DIASTOLIC BLOOD PRESSURE: 95 MMHG

## 2023-10-12 VITALS
HEART RATE: 84 BPM | SYSTOLIC BLOOD PRESSURE: 176 MMHG | DIASTOLIC BLOOD PRESSURE: 109 MMHG | RESPIRATION RATE: 16 BRPM | OXYGEN SATURATION: 97 %

## 2023-10-12 PROCEDURE — 36415 COLL VENOUS BLD VENIPUNCTURE: CPT

## 2023-10-12 PROCEDURE — 96365 THER/PROPH/DIAG IV INF INIT: CPT

## 2023-10-12 PROCEDURE — 96366 THER/PROPH/DIAG IV INF ADDON: CPT

## 2023-10-12 RX ORDER — IMMUNE GLOBULIN INTRAVENOUS (HUMAN) 10 G
10 KIT INTRAVENOUS
Qty: 0 | Refills: 0 | Status: COMPLETED | OUTPATIENT
Start: 2023-09-15

## 2023-10-12 RX ORDER — ACETAMINOPHEN 325 MG/1
325 TABLET ORAL
Qty: 0 | Refills: 0 | Status: COMPLETED
Start: 2023-04-05

## 2023-10-12 RX ORDER — DIPHENHYDRAMINE HCL 25 MG/1
25 TABLET ORAL
Qty: 0 | Refills: 0 | Status: COMPLETED
Start: 2023-04-05

## 2023-10-13 LAB — IGG SER QL IEP: 668 MG/DL

## 2023-10-16 ENCOUNTER — NON-APPOINTMENT (OUTPATIENT)
Age: 58
End: 2023-10-16

## 2023-10-17 ENCOUNTER — APPOINTMENT (OUTPATIENT)
Dept: ALLERGY | Facility: CLINIC | Age: 58
End: 2023-10-17
Payer: MEDICARE

## 2023-10-17 VITALS
HEART RATE: 87 BPM | TEMPERATURE: 98.4 F | SYSTOLIC BLOOD PRESSURE: 170 MMHG | BODY MASS INDEX: 30.8 KG/M2 | WEIGHT: 240 LBS | OXYGEN SATURATION: 98 % | HEIGHT: 74 IN | DIASTOLIC BLOOD PRESSURE: 80 MMHG

## 2023-10-17 PROCEDURE — 90686 IIV4 VACC NO PRSV 0.5 ML IM: CPT

## 2023-10-17 PROCEDURE — 99214 OFFICE O/P EST MOD 30 MIN: CPT | Mod: 25

## 2023-10-17 PROCEDURE — G0008: CPT

## 2023-11-06 DIAGNOSIS — S31.819A UNSPECIFIED OPEN WOUND OF RIGHT BUTTOCK, INITIAL ENCOUNTER: ICD-10-CM

## 2023-11-07 ENCOUNTER — APPOINTMENT (OUTPATIENT)
Dept: RHEUMATOLOGY | Facility: CLINIC | Age: 58
End: 2023-11-07
Payer: MEDICARE

## 2023-11-07 VITALS — HEART RATE: 88 BPM | OXYGEN SATURATION: 95 % | SYSTOLIC BLOOD PRESSURE: 175 MMHG | DIASTOLIC BLOOD PRESSURE: 89 MMHG

## 2023-11-07 VITALS
RESPIRATION RATE: 16 BRPM | DIASTOLIC BLOOD PRESSURE: 79 MMHG | HEART RATE: 65 BPM | TEMPERATURE: 98.2 F | OXYGEN SATURATION: 95 % | SYSTOLIC BLOOD PRESSURE: 154 MMHG

## 2023-11-07 PROCEDURE — 96366 THER/PROPH/DIAG IV INF ADDON: CPT

## 2023-11-07 PROCEDURE — 96365 THER/PROPH/DIAG IV INF INIT: CPT

## 2023-11-07 RX ORDER — DIPHENHYDRAMINE HCL 25 MG/1
25 TABLET ORAL
Qty: 0 | Refills: 0 | Status: COMPLETED
Start: 2023-04-05

## 2023-11-07 RX ORDER — IMMUNE GLOBULIN INTRAVENOUS (HUMAN) 10 G
10 KIT INTRAVENOUS
Qty: 0 | Refills: 0 | Status: COMPLETED | OUTPATIENT
Start: 2023-07-21

## 2023-11-07 RX ORDER — ACETAMINOPHEN 325 MG/1
325 TABLET ORAL
Qty: 0 | Refills: 0 | Status: COMPLETED
Start: 2023-04-05

## 2023-11-16 ENCOUNTER — INPATIENT (INPATIENT)
Facility: HOSPITAL | Age: 58
LOS: 7 days | Discharge: HOME | End: 2023-11-24
Attending: INTERNAL MEDICINE | Admitting: INTERNAL MEDICINE
Payer: MEDICARE

## 2023-11-16 VITALS
DIASTOLIC BLOOD PRESSURE: 92 MMHG | TEMPERATURE: 98 F | OXYGEN SATURATION: 100 % | RESPIRATION RATE: 979 BRPM | SYSTOLIC BLOOD PRESSURE: 154 MMHG | HEART RATE: 86 BPM

## 2023-11-16 DIAGNOSIS — J34.2 DEVIATED NASAL SEPTUM: Chronic | ICD-10-CM

## 2023-11-16 DIAGNOSIS — K08.199 COMPLETE LOSS OF TEETH DUE TO OTHER SPECIFIED CAUSE, UNSPECIFIED CLASS: Chronic | ICD-10-CM

## 2023-11-16 PROCEDURE — 99285 EMERGENCY DEPT VISIT HI MDM: CPT

## 2023-11-16 PROCEDURE — 99285 EMERGENCY DEPT VISIT HI MDM: CPT | Mod: FS

## 2023-11-16 NOTE — ED BEHAVIORAL HEALTH ASSESSMENT NOTE - SUMMARY
56M, domiciled in supportive housing TSI, SSD, single, PMH HTN, DM, hypogammaglobulinemia, PPHx schizoaffective d/o, bipolar d/o, hx of multiple psych hospitalizations (last known in 2020 at [facility]), denies hx SA/NSSIB, denies drug or alcohol use, denies legal hx, who presented with right gluteal wound, admitted to medicine for cellulutis. Psychiatry consulted for psychosis/schizophrenia.    On assessment, patient is calm and cooperative. AAO3. No overt symptoms of psychosis, depression, or yo. No S/I or H/I. Patient w/ recurrent/chronic infections. Patient has left multiple hospitals AMA while undergoing treatment for cellulitis. However, patient recognizes benefit of continuing w/ antibiotic treatment, as well as risks of discontinuing treatment. Patient not currently refusing treatment or attempting to leave AMA. Patient w/o severe psychiatric symptoms interfering w/ ability to care for self or make informed decisions. Patient interested in reconnection to outpatient care after discharge. Patient was due for Haldol decanoate 150mg on 4/16. Will obtain collateral see when last received.     Plan:  - Routine observation, no psychiatric indication for CO 1:1  - Will determine date of last Haldol dec; can be given during hospitalization; needs EKG prior to administration  - If patient refuses treatment or attempts to leave AMA, psych can reassess for capacity   - Zyprexa 2.5-5mg PO/IM/IV q6hrs PRN for agitation  - Will obtain additional collateral   - Dispo: when medically cleared; likely does not meet criteria for inpatient psychiatric admission      58M, domiciled in supportive housing TSI, SSD, single, PMH HTN, DM, hypogammaglobulinemia, PPHx schizoaffective d/o, bipolar d/o, hx of multiple psych hospitalizations (last known in 2020 at [facility]), denies hx SA/NSSIB, pt in treatment at [facility]OP[facility] with DR. Cook , and is on BELLA denies drug or alcohol use, denies legal hx;   pt bib self for paranoia.    Patient is presenting with decompensated psychosis in spite of treatment compliance . Patient is endorsing +hi with the plan to stab any one who is wearing a police uniform ,  in the setting of paranoia  and +ah . Patient at this time warrants psychiatric admission for safety and stabilization and amendable to voluntary admission.       Plan:  - Routine observation, no psychiatric indication for CO 1:1, not suicidal , not aggressive  - contiue Abilify 300mg Maintena q4 weeks, next dose is due on 12/14, last dose on 11/16  - Haldol 5mg /ativan 2mg po/im q6hrs prn for agitation  - nicotine replacement  - sliding scale for DM , metformin dose will need to be verified , HTN:  Labetolol, conitnue atorvastatin 40 milliGRAM(s) Oral at bedtimebudesonide 160 MICROgram(s)/formoterol 4.5 MICROgram(s) Inhaler 2 Puff(s) Inhalation two times a day, cholecalciferol 2000 Unit(s) Oral daily 58M, domiciled in supportive housing TSI, SSD, single, PMH HTN, DM, hypogammaglobulinemia, PPHx schizoaffective d/o, bipolar d/o, hx of multiple psych hospitalizations (last known in 2020 at [facility]), denies hx SA/NSSIB, pt in treatment at [facility]OP[facility] with DR. Cook , and is on BELLA denies drug or alcohol use, denies legal hx;   pt bib self for paranoia.    Patient is presenting with decompensated psychosis in spite of treatment compliance . Patient is endorsing +hi with the plan to stab any one who is wearing a police uniform ,  in the setting of paranoia  and +ah . Patient at this time warrants psychiatric admission for safety and stabilization and amendable to voluntary admission.       Plan:  - Routine observation, no psychiatric indication for CO 1:1, not suicidal , not aggressive  - contiue Abilify 300mg Maintena q4 weeks, next dose is due on 12/14, last dose on 11/16  - Haldol 5mg /ativan 2mg po/im q6hrs prn for agitation  - nicotine replacement  - sliding scale for DM , metformin dose will need to be verified , HTN:  Labetolol (DOSE needs to be verified with the pharmacy), continue atorvastatin 40 milliGRAM(s) Oral at bedtimebudesonide 160 MICROgram(s)/formoterol 4.5 MICROgram(s) Inhaler 2 Puff(s) Inhalation two times a day, cholecalciferol 2000 Unit(s) Oral daily 58M, domiciled in supportive housing TSI, SSD, single, PMH HTN, DM, hypogammaglobulinemia, PPHx schizoaffective d/o, bipolar d/o, hx of multiple psych hospitalizations (last known in 2020 at [facility]), denies hx SA/NSSIB, pt in treatment at [facility]OP[facility] with DR. Cook , and is on BELLA denies drug or alcohol use, denies legal hx;   pt bib self for paranoia.    Patient is presenting with decompensated psychosis in spite of treatment compliance . Patient is endorsing +hi with the plan to stab any one who is wearing a police uniform ,  in the setting of paranoia  and +ah . Patient at this time warrants psychiatric admission for safety and stabilization and amendable to voluntary admission.       Plan:  - Routine observation, no psychiatric indication for CO 1:1, not suicidal , not aggressive  - contiue Abilify 300mg Maintena q4 weeks, next dose is due on 12/14, last dose on 11/16  - Haldol 5mg /ativan 2mg po/im q6hrs prn for agitation  - nicotine replacement  - sliding scale for DM , metformin dose will need to be verified , HTN:  Labetolol (DOSE needs to be verified with the pharmacy), continue atorvastatin 40 milliGRAM(s) Oral at bedtimebudesonide 160 MICROgram(s)/formoterol 4.5 MICROgram(s) Inhaler 2 Puff(s) Inhalation two times a day, cholecalciferol 2000 Unit(s) Oral daily    Addendum: pt is found to have Na 124 , will be medically admitted  pt will need 1:1 while on the medical floor, unpredictable due to psychosis  medical issues : as per medical team  contiue Abilify 300mg Maintena q4 weeks, next dose is due on 12/14, last dose on 11/16  - Haldol 5mg /ativan 2mg po/im q6hrs prn for agitation, CHECK ECG FOR QTC . IF qtc >500msec hold haldol   - nicotine replacement

## 2023-11-16 NOTE — ED ADULT NURSE NOTE - NS PRO PASSIVE SMOKE EXP
team pt, consultants, nursing, ICU pt, consultants, nursing, ICU patient and family @ bedside patient and family patient and RN patient and family @ bedside patient and family @ bedside RN and family @ bedside family @ bedside patient and RN Yes...

## 2023-11-16 NOTE — ED BEHAVIORAL HEALTH ASSESSMENT NOTE - VIOLENCE PROTECTIVE FACTORS:
Residential stability/Sobriety/Engagement in treatment/Good treatment response/compliance Residential stability/Sobriety/Engagement in treatment

## 2023-11-16 NOTE — ED BEHAVIORAL HEALTH ASSESSMENT NOTE - RISK ASSESSMENT
The patient is at chronically elevated risk of self harm and suicide. Risk factors include schizoaffective disorder, hx multiple hospitalizations, hx SI. Protective factors include stable housing, supportive family, engagement in treatment. Overall, the patient is not an acute and imminent risk of harm and does not meet criteria for psychiatric hospitalization for safety and stabilization.  The patient is at chronically elevated risk of self harm and suicide. Risk factors include schizoaffective disorder, hx multiple hospitalizations, hx SI. Protective factors include stable housing, supportive family, engagement in treatment. Overall, the patient is at imminent risk for harm to others due to +hi in the setting of paranoia, requiring hospitalization for safety.

## 2023-11-16 NOTE — ED BEHAVIORAL HEALTH ASSESSMENT NOTE - CURRENT MEDICATION
MEDICATIONS  (STANDING):  atorvastatin 40 milliGRAM(s) Oral at bedtime  budesonide 160 MICROgram(s)/formoterol 4.5 MICROgram(s) Inhaler 2 Puff(s) Inhalation two times a day  cholecalciferol 2000 Unit(s) Oral daily  dextrose 5%. 1000 milliLiter(s) (100 mL/Hr) IV Continuous <Continuous>  dextrose 5%. 1000 milliLiter(s) (50 mL/Hr) IV Continuous <Continuous>  dextrose 50% Injectable 25 Gram(s) IV Push once  dextrose 50% Injectable 25 Gram(s) IV Push once  dextrose 50% Injectable 12.5 Gram(s) IV Push once  diltiazem    milliGRAM(s) Oral daily  enoxaparin Injectable 40 milliGRAM(s) SubCutaneous every 24 hours  glucagon  Injectable 1 milliGRAM(s) IntraMuscular once  insulin lispro (ADMELOG) corrective regimen sliding scale   SubCutaneous three times a day before meals  insulin lispro (ADMELOG) corrective regimen sliding scale   SubCutaneous at bedtime  losartan 100 milliGRAM(s) Oral daily  vancomycin  IVPB 1250 milliGRAM(s) IV Intermittent every 8 hours    MEDICATIONS  (PRN):  acetaminophen     Tablet .. 975 milliGRAM(s) Oral every 6 hours PRN Temp greater or equal to 38C (100.4F), Mild Pain (1 - 3), Moderate Pain (4 - 6), Severe Pain (7 - 10)  albuterol    90 MICROgram(s) HFA Inhaler 2 Puff(s) Inhalation every 6 hours PRN Shortness of Breath and/or Wheezing  dextrose Oral Gel 15 Gram(s) Oral once PRN Blood Glucose LESS THAN 70 milliGRAM(s)/deciliter  hydrOXYzine hydrochloride 50 milliGRAM(s) Oral at bedtime PRN Anxiety  oxyCODONE    IR 5 milliGRAM(s) Oral every 4 hours PRN Moderate Pain (4 - 6)   Abilify Maintena 300mg [id number], last injection on 11/16/23, next one on 12/14/23  atorvastatin 40 milliGRAM(s) Oral at bedtime  budesonide 160 MICROgram(s)/formoterol 4.5 MICROgram(s) Inhaler 2 Puff(s) Inhalation two times a day  cholecalciferol 2000 Unit(s) Oral daily  Labetolol unknown dose and metformin unknown dose

## 2023-11-16 NOTE — ED ADULT TRIAGE NOTE - CHIEF COMPLAINT QUOTE
presents C/O paranoia " I think a  is following me" denies SI/HI AVH. No complaints of chest pain, headache, nausea, dizziness, vomiting  SOB, fever, chills verbalized. phx Bipolar disorder. DM2( no insulin)
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
## 2023-11-16 NOTE — ED BEHAVIORAL HEALTH ASSESSMENT NOTE - OTHER PAST PSYCHIATRIC HISTORY (INCLUDE DETAILS REGARDING ONSET, COURSE OF ILLNESS, INPATIENT/OUTPATIENT TREATMENT)
reported hx of schizoaffective d/o, bipolar disorder with no past SA/SIB, hx of multiple psych hospitalizations (last known in [place] 10/2020 - 11/2020), with hx of outpatient psych services at [facility]/ Dr. Hollie Cook
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
## 2023-11-16 NOTE — ED BEHAVIORAL HEALTH ASSESSMENT NOTE - DESCRIPTION
Vital Signs Last 24 Hrs  T(C): 36.6 (16 Nov 2023 23:15), Max: 36.6 (16 Nov 2023 23:15)  T(F): 97.8 (16 Nov 2023 23:15), Max: 97.8 (16 Nov 2023 23:15)  HR: 86 (16 Nov 2023 23:15) (86 - 86)  BP: 154/92 (16 Nov 2023 23:15) (154/92 - 154/92)  BP(mean): --  RR: 979 (16 Nov 2023 23:15) (979 - 979)  SpO2: 100% (16 Nov 2023 23:15) (100% - 100%)    Parameters below as of 16 Nov 2023 23:15  Patient On (Oxygen Delivery Method): room air lives in supportive housing, on disability, close to sisters Susannah and Brittany ([phone]) per pt: HTN, common variable immunodeficiency (reported to get monthly IVIG infusion - next due in 3.5 week) Patient is anxious, in distress due to the voices , required IM medications: [id number]   Vital Signs Last 24 Hrs  T(C): 36.6 (16 Nov 2023 23:15), Max: 36.6 (16 Nov 2023 23:15)  T(F): 97.8 (16 Nov 2023 23:15), Max: 97.8 (16 Nov 2023 23:15)  HR: 86 (16 Nov 2023 23:15) (86 - 86)  BP: 154/92 (16 Nov 2023 23:15) (154/92 - 154/92)  BP(mean): --  RR: 979 (16 Nov 2023 23:15) (979 - 979)  SpO2: 100% (16 Nov 2023 23:15) (100% - 100%)    Parameters below as of 16 Nov 2023 23:15  Patient On (Oxygen Delivery Method): room air

## 2023-11-16 NOTE — ED BEHAVIORAL HEALTH ASSESSMENT NOTE - VIOLENCE RISK FACTORS:
Irritability Feeling of being under threat and being unable to control threat/Affective dysregulation/Irritability

## 2023-11-17 DIAGNOSIS — F22 DELUSIONAL DISORDERS: ICD-10-CM

## 2023-11-17 DIAGNOSIS — E78.5 HYPERLIPIDEMIA, UNSPECIFIED: ICD-10-CM

## 2023-11-17 DIAGNOSIS — Z29.9 ENCOUNTER FOR PROPHYLACTIC MEASURES, UNSPECIFIED: ICD-10-CM

## 2023-11-17 DIAGNOSIS — E11.9 TYPE 2 DIABETES MELLITUS WITHOUT COMPLICATIONS: ICD-10-CM

## 2023-11-17 DIAGNOSIS — I10 ESSENTIAL (PRIMARY) HYPERTENSION: ICD-10-CM

## 2023-11-17 DIAGNOSIS — E87.1 HYPO-OSMOLALITY AND HYPONATREMIA: ICD-10-CM

## 2023-11-17 DIAGNOSIS — D83.9 COMMON VARIABLE IMMUNODEFICIENCY, UNSPECIFIED: ICD-10-CM

## 2023-11-17 DIAGNOSIS — F25.9 SCHIZOAFFECTIVE DISORDER, UNSPECIFIED: ICD-10-CM

## 2023-11-17 LAB
ALBUMIN SERPL ELPH-MCNC: 4.1 G/DL — SIGNIFICANT CHANGE UP (ref 3.3–5)
ALBUMIN SERPL ELPH-MCNC: 4.1 G/DL — SIGNIFICANT CHANGE UP (ref 3.3–5)
ALBUMIN SERPL ELPH-MCNC: 4.4 G/DL — SIGNIFICANT CHANGE UP (ref 3.3–5)
ALBUMIN SERPL ELPH-MCNC: 4.4 G/DL — SIGNIFICANT CHANGE UP (ref 3.3–5)
ALP SERPL-CCNC: 124 U/L — HIGH (ref 40–120)
ALP SERPL-CCNC: 124 U/L — HIGH (ref 40–120)
ALP SERPL-CCNC: 129 U/L — HIGH (ref 40–120)
ALP SERPL-CCNC: 129 U/L — HIGH (ref 40–120)
ALT FLD-CCNC: 31 U/L — SIGNIFICANT CHANGE UP (ref 4–41)
ALT FLD-CCNC: 31 U/L — SIGNIFICANT CHANGE UP (ref 4–41)
ALT FLD-CCNC: 34 U/L — SIGNIFICANT CHANGE UP (ref 4–41)
ALT FLD-CCNC: 34 U/L — SIGNIFICANT CHANGE UP (ref 4–41)
AMPHET UR-MCNC: NEGATIVE — SIGNIFICANT CHANGE UP
AMPHET UR-MCNC: NEGATIVE — SIGNIFICANT CHANGE UP
ANION GAP SERPL CALC-SCNC: 10 MMOL/L — SIGNIFICANT CHANGE UP (ref 7–14)
ANION GAP SERPL CALC-SCNC: 12 MMOL/L — SIGNIFICANT CHANGE UP (ref 7–14)
ANION GAP SERPL CALC-SCNC: 12 MMOL/L — SIGNIFICANT CHANGE UP (ref 7–14)
APAP SERPL-MCNC: <10 UG/ML — LOW (ref 15–25)
APAP SERPL-MCNC: <10 UG/ML — LOW (ref 15–25)
APPEARANCE UR: CLEAR — SIGNIFICANT CHANGE UP
APPEARANCE UR: CLEAR — SIGNIFICANT CHANGE UP
AST SERPL-CCNC: 43 U/L — HIGH (ref 4–40)
AST SERPL-CCNC: 43 U/L — HIGH (ref 4–40)
AST SERPL-CCNC: 48 U/L — HIGH (ref 4–40)
AST SERPL-CCNC: 48 U/L — HIGH (ref 4–40)
BACTERIA # UR AUTO: NEGATIVE /HPF — SIGNIFICANT CHANGE UP
BACTERIA # UR AUTO: NEGATIVE /HPF — SIGNIFICANT CHANGE UP
BARBITURATES UR SCN-MCNC: NEGATIVE — SIGNIFICANT CHANGE UP
BARBITURATES UR SCN-MCNC: NEGATIVE — SIGNIFICANT CHANGE UP
BASOPHILS # BLD AUTO: 0.02 K/UL — SIGNIFICANT CHANGE UP (ref 0–0.2)
BASOPHILS # BLD AUTO: 0.02 K/UL — SIGNIFICANT CHANGE UP (ref 0–0.2)
BASOPHILS NFR BLD AUTO: 0.3 % — SIGNIFICANT CHANGE UP (ref 0–2)
BASOPHILS NFR BLD AUTO: 0.3 % — SIGNIFICANT CHANGE UP (ref 0–2)
BENZODIAZ UR-MCNC: NEGATIVE — SIGNIFICANT CHANGE UP
BENZODIAZ UR-MCNC: NEGATIVE — SIGNIFICANT CHANGE UP
BILIRUB SERPL-MCNC: 0.3 MG/DL — SIGNIFICANT CHANGE UP (ref 0.2–1.2)
BILIRUB SERPL-MCNC: 0.3 MG/DL — SIGNIFICANT CHANGE UP (ref 0.2–1.2)
BILIRUB SERPL-MCNC: 0.4 MG/DL — SIGNIFICANT CHANGE UP (ref 0.2–1.2)
BILIRUB SERPL-MCNC: 0.4 MG/DL — SIGNIFICANT CHANGE UP (ref 0.2–1.2)
BILIRUB UR-MCNC: NEGATIVE — SIGNIFICANT CHANGE UP
BILIRUB UR-MCNC: NEGATIVE — SIGNIFICANT CHANGE UP
BUN SERPL-MCNC: 11 MG/DL — SIGNIFICANT CHANGE UP (ref 7–23)
BUN SERPL-MCNC: 11 MG/DL — SIGNIFICANT CHANGE UP (ref 7–23)
BUN SERPL-MCNC: 12 MG/DL — SIGNIFICANT CHANGE UP (ref 7–23)
CALCIUM SERPL-MCNC: 9.4 MG/DL — SIGNIFICANT CHANGE UP (ref 8.4–10.5)
CALCIUM SERPL-MCNC: 9.7 MG/DL — SIGNIFICANT CHANGE UP (ref 8.4–10.5)
CALCIUM SERPL-MCNC: 9.7 MG/DL — SIGNIFICANT CHANGE UP (ref 8.4–10.5)
CAST: 0 /LPF — SIGNIFICANT CHANGE UP (ref 0–4)
CAST: 0 /LPF — SIGNIFICANT CHANGE UP (ref 0–4)
CHLORIDE SERPL-SCNC: 88 MMOL/L — LOW (ref 98–107)
CHLORIDE SERPL-SCNC: 88 MMOL/L — LOW (ref 98–107)
CHLORIDE SERPL-SCNC: 92 MMOL/L — LOW (ref 98–107)
CHLORIDE SERPL-SCNC: 92 MMOL/L — LOW (ref 98–107)
CHLORIDE SERPL-SCNC: 93 MMOL/L — LOW (ref 98–107)
CHLORIDE SERPL-SCNC: 93 MMOL/L — LOW (ref 98–107)
CO2 SERPL-SCNC: 24 MMOL/L — SIGNIFICANT CHANGE UP (ref 22–31)
CO2 SERPL-SCNC: 24 MMOL/L — SIGNIFICANT CHANGE UP (ref 22–31)
CO2 SERPL-SCNC: 27 MMOL/L — SIGNIFICANT CHANGE UP (ref 22–31)
CO2 SERPL-SCNC: 27 MMOL/L — SIGNIFICANT CHANGE UP (ref 22–31)
CO2 SERPL-SCNC: 29 MMOL/L — SIGNIFICANT CHANGE UP (ref 22–31)
CO2 SERPL-SCNC: 29 MMOL/L — SIGNIFICANT CHANGE UP (ref 22–31)
COCAINE METAB.OTHER UR-MCNC: NEGATIVE — SIGNIFICANT CHANGE UP
COCAINE METAB.OTHER UR-MCNC: NEGATIVE — SIGNIFICANT CHANGE UP
COLOR SPEC: YELLOW — SIGNIFICANT CHANGE UP
COLOR SPEC: YELLOW — SIGNIFICANT CHANGE UP
CREAT SERPL-MCNC: 0.78 MG/DL — SIGNIFICANT CHANGE UP (ref 0.5–1.3)
CREAT SERPL-MCNC: 0.78 MG/DL — SIGNIFICANT CHANGE UP (ref 0.5–1.3)
CREAT SERPL-MCNC: 0.79 MG/DL — SIGNIFICANT CHANGE UP (ref 0.5–1.3)
CREAT SERPL-MCNC: 0.79 MG/DL — SIGNIFICANT CHANGE UP (ref 0.5–1.3)
CREAT SERPL-MCNC: 0.85 MG/DL — SIGNIFICANT CHANGE UP (ref 0.5–1.3)
CREAT SERPL-MCNC: 0.85 MG/DL — SIGNIFICANT CHANGE UP (ref 0.5–1.3)
CREATININE URINE RESULT, DAU: 15 MG/DL — SIGNIFICANT CHANGE UP
CREATININE URINE RESULT, DAU: 15 MG/DL — SIGNIFICANT CHANGE UP
DIFF PNL FLD: ABNORMAL
DIFF PNL FLD: ABNORMAL
EGFR: 102 ML/MIN/1.73M2 — SIGNIFICANT CHANGE UP
EGFR: 102 ML/MIN/1.73M2 — SIGNIFICANT CHANGE UP
EGFR: 104 ML/MIN/1.73M2 — SIGNIFICANT CHANGE UP
EGFR: 104 ML/MIN/1.73M2 — SIGNIFICANT CHANGE UP
EGFR: 105 ML/MIN/1.73M2 — SIGNIFICANT CHANGE UP
EGFR: 105 ML/MIN/1.73M2 — SIGNIFICANT CHANGE UP
EOSINOPHIL # BLD AUTO: 0.19 K/UL — SIGNIFICANT CHANGE UP (ref 0–0.5)
EOSINOPHIL # BLD AUTO: 0.19 K/UL — SIGNIFICANT CHANGE UP (ref 0–0.5)
EOSINOPHIL NFR BLD AUTO: 3.2 % — SIGNIFICANT CHANGE UP (ref 0–6)
EOSINOPHIL NFR BLD AUTO: 3.2 % — SIGNIFICANT CHANGE UP (ref 0–6)
ETHANOL SERPL-MCNC: <10 MG/DL — SIGNIFICANT CHANGE UP
ETHANOL SERPL-MCNC: <10 MG/DL — SIGNIFICANT CHANGE UP
GLUCOSE BLDC GLUCOMTR-MCNC: 103 MG/DL — HIGH (ref 70–99)
GLUCOSE BLDC GLUCOMTR-MCNC: 103 MG/DL — HIGH (ref 70–99)
GLUCOSE BLDC GLUCOMTR-MCNC: 104 MG/DL — HIGH (ref 70–99)
GLUCOSE BLDC GLUCOMTR-MCNC: 104 MG/DL — HIGH (ref 70–99)
GLUCOSE BLDC GLUCOMTR-MCNC: 110 MG/DL — HIGH (ref 70–99)
GLUCOSE BLDC GLUCOMTR-MCNC: 110 MG/DL — HIGH (ref 70–99)
GLUCOSE BLDC GLUCOMTR-MCNC: 131 MG/DL — HIGH (ref 70–99)
GLUCOSE BLDC GLUCOMTR-MCNC: 131 MG/DL — HIGH (ref 70–99)
GLUCOSE BLDC GLUCOMTR-MCNC: 97 MG/DL — SIGNIFICANT CHANGE UP (ref 70–99)
GLUCOSE BLDC GLUCOMTR-MCNC: 97 MG/DL — SIGNIFICANT CHANGE UP (ref 70–99)
GLUCOSE SERPL-MCNC: 103 MG/DL — HIGH (ref 70–99)
GLUCOSE SERPL-MCNC: 103 MG/DL — HIGH (ref 70–99)
GLUCOSE SERPL-MCNC: 104 MG/DL — HIGH (ref 70–99)
GLUCOSE SERPL-MCNC: 104 MG/DL — HIGH (ref 70–99)
GLUCOSE SERPL-MCNC: 139 MG/DL — HIGH (ref 70–99)
GLUCOSE SERPL-MCNC: 139 MG/DL — HIGH (ref 70–99)
GLUCOSE UR QL: NEGATIVE MG/DL — SIGNIFICANT CHANGE UP
GLUCOSE UR QL: NEGATIVE MG/DL — SIGNIFICANT CHANGE UP
HCT VFR BLD CALC: 38.6 % — LOW (ref 39–50)
HCT VFR BLD CALC: 38.6 % — LOW (ref 39–50)
HCT VFR BLD CALC: 39.2 % — SIGNIFICANT CHANGE UP (ref 39–50)
HCT VFR BLD CALC: 39.2 % — SIGNIFICANT CHANGE UP (ref 39–50)
HGB BLD-MCNC: 13.2 G/DL — SIGNIFICANT CHANGE UP (ref 13–17)
HGB BLD-MCNC: 13.2 G/DL — SIGNIFICANT CHANGE UP (ref 13–17)
HGB BLD-MCNC: 13.5 G/DL — SIGNIFICANT CHANGE UP (ref 13–17)
HGB BLD-MCNC: 13.5 G/DL — SIGNIFICANT CHANGE UP (ref 13–17)
IANC: 4 K/UL — SIGNIFICANT CHANGE UP (ref 1.8–7.4)
IANC: 4 K/UL — SIGNIFICANT CHANGE UP (ref 1.8–7.4)
IMM GRANULOCYTES NFR BLD AUTO: 0.2 % — SIGNIFICANT CHANGE UP (ref 0–0.9)
IMM GRANULOCYTES NFR BLD AUTO: 0.2 % — SIGNIFICANT CHANGE UP (ref 0–0.9)
KETONES UR-MCNC: NEGATIVE MG/DL — SIGNIFICANT CHANGE UP
KETONES UR-MCNC: NEGATIVE MG/DL — SIGNIFICANT CHANGE UP
LEUKOCYTE ESTERASE UR-ACNC: NEGATIVE — SIGNIFICANT CHANGE UP
LEUKOCYTE ESTERASE UR-ACNC: NEGATIVE — SIGNIFICANT CHANGE UP
LYMPHOCYTES # BLD AUTO: 1.19 K/UL — SIGNIFICANT CHANGE UP (ref 1–3.3)
LYMPHOCYTES # BLD AUTO: 1.19 K/UL — SIGNIFICANT CHANGE UP (ref 1–3.3)
LYMPHOCYTES # BLD AUTO: 19.8 % — SIGNIFICANT CHANGE UP (ref 13–44)
LYMPHOCYTES # BLD AUTO: 19.8 % — SIGNIFICANT CHANGE UP (ref 13–44)
MAGNESIUM SERPL-MCNC: 2.2 MG/DL — SIGNIFICANT CHANGE UP (ref 1.6–2.6)
MAGNESIUM SERPL-MCNC: 2.2 MG/DL — SIGNIFICANT CHANGE UP (ref 1.6–2.6)
MCHC RBC-ENTMCNC: 27 PG — SIGNIFICANT CHANGE UP (ref 27–34)
MCHC RBC-ENTMCNC: 27 PG — SIGNIFICANT CHANGE UP (ref 27–34)
MCHC RBC-ENTMCNC: 27.2 PG — SIGNIFICANT CHANGE UP (ref 27–34)
MCHC RBC-ENTMCNC: 27.2 PG — SIGNIFICANT CHANGE UP (ref 27–34)
MCHC RBC-ENTMCNC: 34.2 GM/DL — SIGNIFICANT CHANGE UP (ref 32–36)
MCHC RBC-ENTMCNC: 34.2 GM/DL — SIGNIFICANT CHANGE UP (ref 32–36)
MCHC RBC-ENTMCNC: 34.4 GM/DL — SIGNIFICANT CHANGE UP (ref 32–36)
MCHC RBC-ENTMCNC: 34.4 GM/DL — SIGNIFICANT CHANGE UP (ref 32–36)
MCV RBC AUTO: 78.9 FL — LOW (ref 80–100)
METHADONE UR-MCNC: NEGATIVE — SIGNIFICANT CHANGE UP
METHADONE UR-MCNC: NEGATIVE — SIGNIFICANT CHANGE UP
MONOCYTES # BLD AUTO: 0.6 K/UL — SIGNIFICANT CHANGE UP (ref 0–0.9)
MONOCYTES # BLD AUTO: 0.6 K/UL — SIGNIFICANT CHANGE UP (ref 0–0.9)
MONOCYTES NFR BLD AUTO: 10 % — SIGNIFICANT CHANGE UP (ref 2–14)
MONOCYTES NFR BLD AUTO: 10 % — SIGNIFICANT CHANGE UP (ref 2–14)
NEUTROPHILS # BLD AUTO: 4 K/UL — SIGNIFICANT CHANGE UP (ref 1.8–7.4)
NEUTROPHILS # BLD AUTO: 4 K/UL — SIGNIFICANT CHANGE UP (ref 1.8–7.4)
NEUTROPHILS NFR BLD AUTO: 66.5 % — SIGNIFICANT CHANGE UP (ref 43–77)
NEUTROPHILS NFR BLD AUTO: 66.5 % — SIGNIFICANT CHANGE UP (ref 43–77)
NITRITE UR-MCNC: NEGATIVE — SIGNIFICANT CHANGE UP
NITRITE UR-MCNC: NEGATIVE — SIGNIFICANT CHANGE UP
NRBC # BLD: 0 /100 WBCS — SIGNIFICANT CHANGE UP (ref 0–0)
NRBC # FLD: 0 K/UL — SIGNIFICANT CHANGE UP (ref 0–0)
OPIATES UR-MCNC: NEGATIVE — SIGNIFICANT CHANGE UP
OPIATES UR-MCNC: NEGATIVE — SIGNIFICANT CHANGE UP
OSMOLALITY SERPL: 269 MOSM/KG — LOW (ref 275–295)
OSMOLALITY SERPL: 269 MOSM/KG — LOW (ref 275–295)
OXYCODONE UR-MCNC: NEGATIVE — SIGNIFICANT CHANGE UP
OXYCODONE UR-MCNC: NEGATIVE — SIGNIFICANT CHANGE UP
PCP SPEC-MCNC: SIGNIFICANT CHANGE UP
PCP SPEC-MCNC: SIGNIFICANT CHANGE UP
PCP UR-MCNC: NEGATIVE — SIGNIFICANT CHANGE UP
PCP UR-MCNC: NEGATIVE — SIGNIFICANT CHANGE UP
PH UR: 6.5 — SIGNIFICANT CHANGE UP (ref 5–8)
PH UR: 6.5 — SIGNIFICANT CHANGE UP (ref 5–8)
PHOSPHATE SERPL-MCNC: 4.2 MG/DL — SIGNIFICANT CHANGE UP (ref 2.5–4.5)
PHOSPHATE SERPL-MCNC: 4.2 MG/DL — SIGNIFICANT CHANGE UP (ref 2.5–4.5)
PLATELET # BLD AUTO: 173 K/UL — SIGNIFICANT CHANGE UP (ref 150–400)
PLATELET # BLD AUTO: 173 K/UL — SIGNIFICANT CHANGE UP (ref 150–400)
PLATELET # BLD AUTO: 183 K/UL — SIGNIFICANT CHANGE UP (ref 150–400)
PLATELET # BLD AUTO: 183 K/UL — SIGNIFICANT CHANGE UP (ref 150–400)
POTASSIUM SERPL-MCNC: 3.7 MMOL/L — SIGNIFICANT CHANGE UP (ref 3.5–5.3)
POTASSIUM SERPL-MCNC: 3.7 MMOL/L — SIGNIFICANT CHANGE UP (ref 3.5–5.3)
POTASSIUM SERPL-MCNC: 3.8 MMOL/L — SIGNIFICANT CHANGE UP (ref 3.5–5.3)
POTASSIUM SERPL-MCNC: 3.8 MMOL/L — SIGNIFICANT CHANGE UP (ref 3.5–5.3)
POTASSIUM SERPL-MCNC: 4.3 MMOL/L — SIGNIFICANT CHANGE UP (ref 3.5–5.3)
POTASSIUM SERPL-MCNC: 4.3 MMOL/L — SIGNIFICANT CHANGE UP (ref 3.5–5.3)
POTASSIUM SERPL-SCNC: 3.7 MMOL/L — SIGNIFICANT CHANGE UP (ref 3.5–5.3)
POTASSIUM SERPL-SCNC: 3.7 MMOL/L — SIGNIFICANT CHANGE UP (ref 3.5–5.3)
POTASSIUM SERPL-SCNC: 3.8 MMOL/L — SIGNIFICANT CHANGE UP (ref 3.5–5.3)
POTASSIUM SERPL-SCNC: 3.8 MMOL/L — SIGNIFICANT CHANGE UP (ref 3.5–5.3)
POTASSIUM SERPL-SCNC: 4.3 MMOL/L — SIGNIFICANT CHANGE UP (ref 3.5–5.3)
POTASSIUM SERPL-SCNC: 4.3 MMOL/L — SIGNIFICANT CHANGE UP (ref 3.5–5.3)
PROT SERPL-MCNC: 6.9 G/DL — SIGNIFICANT CHANGE UP (ref 6–8.3)
PROT SERPL-MCNC: 6.9 G/DL — SIGNIFICANT CHANGE UP (ref 6–8.3)
PROT SERPL-MCNC: 7 G/DL — SIGNIFICANT CHANGE UP (ref 6–8.3)
PROT SERPL-MCNC: 7 G/DL — SIGNIFICANT CHANGE UP (ref 6–8.3)
PROT UR-MCNC: 300 MG/DL
PROT UR-MCNC: 300 MG/DL
RBC # BLD: 4.89 M/UL — SIGNIFICANT CHANGE UP (ref 4.2–5.8)
RBC # BLD: 4.89 M/UL — SIGNIFICANT CHANGE UP (ref 4.2–5.8)
RBC # BLD: 4.97 M/UL — SIGNIFICANT CHANGE UP (ref 4.2–5.8)
RBC # BLD: 4.97 M/UL — SIGNIFICANT CHANGE UP (ref 4.2–5.8)
RBC # FLD: 14.2 % — SIGNIFICANT CHANGE UP (ref 10.3–14.5)
RBC # FLD: 14.2 % — SIGNIFICANT CHANGE UP (ref 10.3–14.5)
RBC # FLD: 14.3 % — SIGNIFICANT CHANGE UP (ref 10.3–14.5)
RBC # FLD: 14.3 % — SIGNIFICANT CHANGE UP (ref 10.3–14.5)
RBC CASTS # UR COMP ASSIST: 0 /HPF — SIGNIFICANT CHANGE UP (ref 0–4)
RBC CASTS # UR COMP ASSIST: 0 /HPF — SIGNIFICANT CHANGE UP (ref 0–4)
SALICYLATES SERPL-MCNC: <0.3 MG/DL — LOW (ref 15–30)
SALICYLATES SERPL-MCNC: <0.3 MG/DL — LOW (ref 15–30)
SARS-COV-2 RNA SPEC QL NAA+PROBE: SIGNIFICANT CHANGE UP
SARS-COV-2 RNA SPEC QL NAA+PROBE: SIGNIFICANT CHANGE UP
SODIUM SERPL-SCNC: 124 MMOL/L — LOW (ref 135–145)
SODIUM SERPL-SCNC: 124 MMOL/L — LOW (ref 135–145)
SODIUM SERPL-SCNC: 130 MMOL/L — LOW (ref 135–145)
SODIUM SERPL-SCNC: 130 MMOL/L — LOW (ref 135–145)
SODIUM SERPL-SCNC: 131 MMOL/L — LOW (ref 135–145)
SODIUM SERPL-SCNC: 131 MMOL/L — LOW (ref 135–145)
SP GR SPEC: 1 — SIGNIFICANT CHANGE UP (ref 1–1.03)
SP GR SPEC: 1 — SIGNIFICANT CHANGE UP (ref 1–1.03)
SQUAMOUS # UR AUTO: 0 /HPF — SIGNIFICANT CHANGE UP (ref 0–5)
SQUAMOUS # UR AUTO: 0 /HPF — SIGNIFICANT CHANGE UP (ref 0–5)
THC UR QL: NEGATIVE — SIGNIFICANT CHANGE UP
THC UR QL: NEGATIVE — SIGNIFICANT CHANGE UP
TOXICOLOGY SCREEN, DRUGS OF ABUSE, SERUM RESULT: SIGNIFICANT CHANGE UP
TOXICOLOGY SCREEN, DRUGS OF ABUSE, SERUM RESULT: SIGNIFICANT CHANGE UP
TSH SERPL-MCNC: 3.93 UIU/ML — SIGNIFICANT CHANGE UP (ref 0.27–4.2)
TSH SERPL-MCNC: 3.93 UIU/ML — SIGNIFICANT CHANGE UP (ref 0.27–4.2)
UROBILINOGEN FLD QL: 0.2 MG/DL — SIGNIFICANT CHANGE UP (ref 0.2–1)
UROBILINOGEN FLD QL: 0.2 MG/DL — SIGNIFICANT CHANGE UP (ref 0.2–1)
WBC # BLD: 5.02 K/UL — SIGNIFICANT CHANGE UP (ref 3.8–10.5)
WBC # BLD: 5.02 K/UL — SIGNIFICANT CHANGE UP (ref 3.8–10.5)
WBC # BLD: 6.01 K/UL — SIGNIFICANT CHANGE UP (ref 3.8–10.5)
WBC # BLD: 6.01 K/UL — SIGNIFICANT CHANGE UP (ref 3.8–10.5)
WBC # FLD AUTO: 5.02 K/UL — SIGNIFICANT CHANGE UP (ref 3.8–10.5)
WBC # FLD AUTO: 5.02 K/UL — SIGNIFICANT CHANGE UP (ref 3.8–10.5)
WBC # FLD AUTO: 6.01 K/UL — SIGNIFICANT CHANGE UP (ref 3.8–10.5)
WBC # FLD AUTO: 6.01 K/UL — SIGNIFICANT CHANGE UP (ref 3.8–10.5)
WBC UR QL: 0 /HPF — SIGNIFICANT CHANGE UP (ref 0–5)
WBC UR QL: 0 /HPF — SIGNIFICANT CHANGE UP (ref 0–5)

## 2023-11-17 PROCEDURE — 99233 SBSQ HOSP IP/OBS HIGH 50: CPT

## 2023-11-17 RX ORDER — LANOLIN ALCOHOL/MO/W.PET/CERES
3 CREAM (GRAM) TOPICAL AT BEDTIME
Refills: 0 | Status: DISCONTINUED | OUTPATIENT
Start: 2023-11-17 | End: 2023-11-24

## 2023-11-17 RX ORDER — DEXTROSE 50 % IN WATER 50 %
12.5 SYRINGE (ML) INTRAVENOUS ONCE
Refills: 0 | Status: DISCONTINUED | OUTPATIENT
Start: 2023-11-17 | End: 2023-11-24

## 2023-11-17 RX ORDER — DEXTROSE 50 % IN WATER 50 %
25 SYRINGE (ML) INTRAVENOUS ONCE
Refills: 0 | Status: DISCONTINUED | OUTPATIENT
Start: 2023-11-17 | End: 2023-11-24

## 2023-11-17 RX ORDER — DIPHENHYDRAMINE HCL 50 MG
50 CAPSULE ORAL ONCE
Refills: 0 | Status: COMPLETED | OUTPATIENT
Start: 2023-11-17 | End: 2023-11-17

## 2023-11-17 RX ORDER — ALBUTEROL 90 UG/1
2 AEROSOL, METERED ORAL EVERY 6 HOURS
Refills: 0 | Status: DISCONTINUED | OUTPATIENT
Start: 2023-11-17 | End: 2023-11-17

## 2023-11-17 RX ORDER — LEVOTHYROXINE SODIUM 125 MCG
50 TABLET ORAL DAILY
Refills: 0 | Status: DISCONTINUED | OUTPATIENT
Start: 2023-11-17 | End: 2023-11-24

## 2023-11-17 RX ORDER — HALOPERIDOL DECANOATE 100 MG/ML
5 INJECTION INTRAMUSCULAR EVERY 6 HOURS
Refills: 0 | Status: DISCONTINUED | OUTPATIENT
Start: 2023-11-17 | End: 2023-11-17

## 2023-11-17 RX ORDER — INSULIN LISPRO 100/ML
VIAL (ML) SUBCUTANEOUS AT BEDTIME
Refills: 0 | Status: DISCONTINUED | OUTPATIENT
Start: 2023-11-17 | End: 2023-11-24

## 2023-11-17 RX ORDER — LABETALOL HCL 100 MG
200 TABLET ORAL
Refills: 0 | Status: DISCONTINUED | OUTPATIENT
Start: 2023-11-17 | End: 2023-11-24

## 2023-11-17 RX ORDER — BUDESONIDE AND FORMOTEROL FUMARATE DIHYDRATE 160; 4.5 UG/1; UG/1
2 AEROSOL RESPIRATORY (INHALATION)
Refills: 0 | Status: DISCONTINUED | OUTPATIENT
Start: 2023-11-17 | End: 2023-11-24

## 2023-11-17 RX ORDER — ARIPIPRAZOLE 15 MG/1
5 TABLET ORAL DAILY
Refills: 0 | Status: DISCONTINUED | OUTPATIENT
Start: 2023-11-17 | End: 2023-11-24

## 2023-11-17 RX ORDER — HALOPERIDOL DECANOATE 100 MG/ML
5 INJECTION INTRAMUSCULAR ONCE
Refills: 0 | Status: COMPLETED | OUTPATIENT
Start: 2023-11-17 | End: 2023-11-17

## 2023-11-17 RX ORDER — SODIUM CHLORIDE 9 MG/ML
1000 INJECTION, SOLUTION INTRAVENOUS
Refills: 0 | Status: DISCONTINUED | OUTPATIENT
Start: 2023-11-17 | End: 2023-11-24

## 2023-11-17 RX ORDER — GLUCAGON INJECTION, SOLUTION 0.5 MG/.1ML
1 INJECTION, SOLUTION SUBCUTANEOUS ONCE
Refills: 0 | Status: DISCONTINUED | OUTPATIENT
Start: 2023-11-17 | End: 2023-11-24

## 2023-11-17 RX ORDER — OLANZAPINE 15 MG/1
5 TABLET, FILM COATED ORAL EVERY 4 HOURS
Refills: 0 | Status: DISCONTINUED | OUTPATIENT
Start: 2023-11-17 | End: 2023-11-24

## 2023-11-17 RX ORDER — ACETAMINOPHEN 500 MG
650 TABLET ORAL EVERY 6 HOURS
Refills: 0 | Status: DISCONTINUED | OUTPATIENT
Start: 2023-11-17 | End: 2023-11-24

## 2023-11-17 RX ORDER — ALBUTEROL 90 UG/1
2 AEROSOL, METERED ORAL
Qty: 0 | Refills: 0 | DISCHARGE

## 2023-11-17 RX ORDER — INSULIN LISPRO 100/ML
VIAL (ML) SUBCUTANEOUS
Refills: 0 | Status: DISCONTINUED | OUTPATIENT
Start: 2023-11-17 | End: 2023-11-24

## 2023-11-17 RX ORDER — ATORVASTATIN CALCIUM 80 MG/1
40 TABLET, FILM COATED ORAL AT BEDTIME
Refills: 0 | Status: DISCONTINUED | OUTPATIENT
Start: 2023-11-17 | End: 2023-11-24

## 2023-11-17 RX ORDER — TIOTROPIUM BROMIDE 18 UG/1
2 CAPSULE ORAL; RESPIRATORY (INHALATION) DAILY
Refills: 0 | Status: DISCONTINUED | OUTPATIENT
Start: 2023-11-17 | End: 2023-11-24

## 2023-11-17 RX ORDER — LOSARTAN POTASSIUM 100 MG/1
100 TABLET, FILM COATED ORAL DAILY
Refills: 0 | Status: DISCONTINUED | OUTPATIENT
Start: 2023-11-17 | End: 2023-11-24

## 2023-11-17 RX ORDER — DEXTROSE 50 % IN WATER 50 %
15 SYRINGE (ML) INTRAVENOUS ONCE
Refills: 0 | Status: DISCONTINUED | OUTPATIENT
Start: 2023-11-17 | End: 2023-11-24

## 2023-11-17 RX ADMIN — Medication 50 MILLIGRAM(S): at 00:44

## 2023-11-17 RX ADMIN — HALOPERIDOL DECANOATE 5 MILLIGRAM(S): 100 INJECTION INTRAMUSCULAR at 00:44

## 2023-11-17 RX ADMIN — TIOTROPIUM BROMIDE 2 PUFF(S): 18 CAPSULE ORAL; RESPIRATORY (INHALATION) at 23:06

## 2023-11-17 RX ADMIN — BUDESONIDE AND FORMOTEROL FUMARATE DIHYDRATE 2 PUFF(S): 160; 4.5 AEROSOL RESPIRATORY (INHALATION) at 21:53

## 2023-11-17 RX ADMIN — ATORVASTATIN CALCIUM 40 MILLIGRAM(S): 80 TABLET, FILM COATED ORAL at 21:53

## 2023-11-17 RX ADMIN — LOSARTAN POTASSIUM 100 MILLIGRAM(S): 100 TABLET, FILM COATED ORAL at 11:15

## 2023-11-17 RX ADMIN — ARIPIPRAZOLE 5 MILLIGRAM(S): 15 TABLET ORAL at 17:17

## 2023-11-17 RX ADMIN — Medication 2 MILLIGRAM(S): at 00:44

## 2023-11-17 RX ADMIN — Medication 200 MILLIGRAM(S): at 17:18
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
## 2023-11-17 NOTE — PATIENT PROFILE ADULT - FALL HARM RISK - HARM RISK INTERVENTIONS
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
## 2023-11-17 NOTE — ED PROVIDER NOTE - EKG ADDITIONAL INFORMATION FREE TEXT
Martin: I viewed the EKG myself. My interpretation of the EKG: No hyper-acute T waves, no malignant dysrhythmia, no ischemic ST segment changes.
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
## 2023-11-17 NOTE — BH CONSULTATION LIAISON PROGRESS NOTE - NSBHCONSULTMEDAGITATION_PSY_A_CORE FT
Zyprexa 5 mg IM Q 4 Hours, if QTC is <500  If QTC >500, give Lorazepam 1 mg Q 6hours IM/IV.  DO NOT give benzos within ONE HOUR of zyprexa due to the potential respiratory suppression
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
## 2023-11-17 NOTE — H&P ADULT - NSHPLABSRESULTS_GEN_ALL_CORE
13.2   5.02  )-----------( 173      ( 17 Nov 2023 07:42 )             38.6     11-17    130<L>  |  93<L>  |  12  ----------------------------<  104<H>  3.8   |  27  |  0.78    Ca    9.7      17 Nov 2023 07:42  Phos  4.2     11-17  Mg     2.20     11-17    TPro  6.9  /  Alb  4.1  /  TBili  0.3  /  DBili  x   /  AST  48<H>  /  ALT  34  /  AlkPhos  129<H>  11-17            CAPILLARY BLOOD GLUCOSE      POCT Blood Glucose.: 110 mg/dL (17 Nov 2023 08:49)  POCT Blood Glucose.: 177 mg/dL (16 Nov 2023 23:19)      Urinalysis Basic - ( 17 Nov 2023 07:42 )    Color: x / Appearance: x / SG: x / pH: x  Gluc: 104 mg/dL / Ketone: x  / Bili: x / Urobili: x   Blood: x / Protein: x / Nitrite: x   Leuk Esterase: x / RBC: x / WBC x   Sq Epi: x / Non Sq Epi: x / Bacteria: x        COVID-19 PCR: NotDetec (17 Nov 2023 00:41)  COVID-19 PCR: NotDetec (04 Jun 2023 06:50)  COVID-19 PCR: NotDetec (22 May 2023 10:07)    EKG: Sinus, HR 70, QTc 444, no ischemic changes, ?LVH

## 2023-11-17 NOTE — H&P ADULT - NSHPPHYSICALEXAM_GEN_ALL_CORE
T(C): 36.6 (11-17-23 @ 08:28), Max: 37 (11-17-23 @ 02:30)  HR: 81 (11-17-23 @ 08:28) (58 - 86)  BP: 172/91 (11-17-23 @ 08:28) (154/92 - 172/91)  RR: 18 (11-17-23 @ 08:28) (16 - 979)  SpO2: 99% (11-17-23 @ 08:28) (99% - 100%)    CONSTITUTIONAL: Well groomed, no apparent distress  EYES: PERRLA and symmetric, EOMI, No conjunctival or scleral injection, non-icteric  ENMT: Oral mucosa with moist membranes. Normal dentition; no pharyngeal injection or exudates  NECK: Supple, symmetric and without tracheal deviation   RESP: No respiratory distress, no use of accessory muscles; CTA b/l, no WRR  CV: RRR, +S1S2, no MRG; no JVD; no peripheral edema  GI: Soft, NT, ND, no rebound, no guarding; no palpable masses; no hepatosplenomegaly; no hernia palpated  LYMPH: No cervical LAD or tenderness; no axillary LAD or tenderness; no inguinal LAD or tenderness  MSK: No digital clubbing or cyanosis; examination of the head/neck/spine/ribs/pelvis, RUE, LUE, RLE, LLE without misalignment,            Normal ROM without pain, no spinal tenderness, normal muscle strength/tone  SKIN: No rashes or ulcers noted; no subcutaneous nodules or induration palpable  NEURO: CN II-XII intact; normal reflexes in upper and lower extremities, sensation intact in upper and lower extremities b/l to light touch   PSYCH: A+O x 3, mood and affect appropriate, recent/remote memory intact, reports AH no VH

## 2023-11-17 NOTE — H&P ADULT - PROBLEM SELECTOR PLAN 2
With AH   - appreciate psych recommendations  - on long acting abilify, s/p dose yesterday  - haldol/ativan prn agitation

## 2023-11-17 NOTE — H&P ADULT - PATIENT'S PREFERRED PRONOUN
Him/He Otc Regimen: Vaseline or Aquaphor as needed for dryness Detail Level: Zone Continue Regimen: Claravis 30MG BID once labs received Plan: Strongly recommend sun protection with the upcoming warmer months. Initiate Treatment: TAC as directed PRN Otc Regimen: Vaseline or aquahor healing ointment

## 2023-11-17 NOTE — H&P ADULT - PROBLEM SELECTOR PLAN 1
Likely 2/2 polydypsia per history.  - check urine osm, electrolytes  - trend BMP q8  - increase to 130 from 124 appropriate, goal 6-8 meq/24hr  - encourage fluid restriction

## 2023-11-17 NOTE — BH CONSULTATION LIAISON PROGRESS NOTE - NSBHASSESSMENTFT_PSY_ALL_CORE
Patient is a 58 years old male with the past hx of schizoaffective d/o, bipolar d/o, hx of multiple psych hospitalizations (last known in 2020 at [facility]), denies hx of aborted SA many years ago, denies drug or alcohol use, denies legal hx, ,  pt in treatment at [facility] AOP[facility] with DR. Cook , and is on BELLA; and PMH HTN, T2DM (on metformin), CVID with hypogammaglobulinema, hx of MRSA infection in August, pt BIB self for paranoia. Currently paranoid, ideas of reference, AH, HI.  Recommendations:  Observation 1:1 for psychosis  START Abilify 5 mg daily.  Due for Abilify Maintena 300 mg IM on 12/14.  Monitor QTC, if more than 500, -stop antipsychotics.   Patient is a 58 years old male with the past hx of schizoaffective d/o, bipolar d/o, hx of multiple psych hospitalizations (last known in 2020 at [facility]), denies hx of aborted SA many years ago, denies drug or alcohol use, denies legal hx, ,  pt in treatment at [facility] AOP[facility] with DR. Cook , and is on BELLA; and PMH HTN, T2DM (on metformin), CVID with hypogammaglobulinema, hx of MRSA infection in August, pt BIB self for paranoia. Currently paranoid, ideas of reference, AH, HI.  Recommendations:  Observation 1:1 for psychosis  START Abilify 5 mg daily.  Due for Abilify Maintena 300 mg IM on 12/14.  For agitation:  Zyprexa 5 mg IM Q 4 Hours, if QTC is <500  If QTC >500, give Lorazepam 1 mg Q 6hours IM/IV.  DO NOT give benzos within ONE HOUR of zyprexa due to the potential respiratory suppression   Patient is a 58 years old male with the past hx of schizoaffective d/o, bipolar d/o, hx of multiple psych hospitalizations (last known in 2020 at [facility]), denies hx of aborted SA many years ago, denies drug or alcohol use, denies legal hx, ,  pt in treatment at [facility] AOP[facility] with DR. Cook , and is on BELLA; and PMH HTN, T2DM (on metformin), CVID with hypogammaglobulinema, hx of MRSA infection in August, pt BIB self for paranoia. Currently paranoid, ideas of reference, AH, HI.      Recommendations:  Observation 1:1 for psychosis  - Monitor Na  START Abilify 5 mg daily po  Due for Abilify Maintenna 300 mg IM on 12/14. (Last dose received on 11/16/2023)    For agitation:  Zyprexa 5 mg IM Q 4 Hours PRN  if QTC is <500  If QTC >500, give Lorazepam 1 mg Q 6hours IM/IV.  DO NOT give IM/IV benzos within ONE HOUR of IM zyprexa due to the potential respiratory suppression    DISPOSITION===TBD. Likely will need inpatient psych

## 2023-11-17 NOTE — H&P ADULT - ASSESSMENT
58M with HTN, T2DM (on metformin), CVID with hypogammaglobulinema, schizoaffective disorder with multiple psych hospitalizations who presents with auditory hallucinations, also found to have asymptomatic hyponatremia likely 2/2 polydypsia.
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
## 2023-11-18 LAB
A1C WITH ESTIMATED AVERAGE GLUCOSE RESULT: 5.2 % — SIGNIFICANT CHANGE UP (ref 4–5.6)
A1C WITH ESTIMATED AVERAGE GLUCOSE RESULT: 5.2 % — SIGNIFICANT CHANGE UP (ref 4–5.6)
ANION GAP SERPL CALC-SCNC: 9 MMOL/L — SIGNIFICANT CHANGE UP (ref 7–14)
ANION GAP SERPL CALC-SCNC: 9 MMOL/L — SIGNIFICANT CHANGE UP (ref 7–14)
BUN SERPL-MCNC: 12 MG/DL — SIGNIFICANT CHANGE UP (ref 7–23)
BUN SERPL-MCNC: 12 MG/DL — SIGNIFICANT CHANGE UP (ref 7–23)
CALCIUM SERPL-MCNC: 9.4 MG/DL — SIGNIFICANT CHANGE UP (ref 8.4–10.5)
CALCIUM SERPL-MCNC: 9.4 MG/DL — SIGNIFICANT CHANGE UP (ref 8.4–10.5)
CHLORIDE SERPL-SCNC: 96 MMOL/L — LOW (ref 98–107)
CHLORIDE SERPL-SCNC: 96 MMOL/L — LOW (ref 98–107)
CO2 SERPL-SCNC: 27 MMOL/L — SIGNIFICANT CHANGE UP (ref 22–31)
CO2 SERPL-SCNC: 27 MMOL/L — SIGNIFICANT CHANGE UP (ref 22–31)
CREAT SERPL-MCNC: 0.77 MG/DL — SIGNIFICANT CHANGE UP (ref 0.5–1.3)
CREAT SERPL-MCNC: 0.77 MG/DL — SIGNIFICANT CHANGE UP (ref 0.5–1.3)
EGFR: 105 ML/MIN/1.73M2 — SIGNIFICANT CHANGE UP
EGFR: 105 ML/MIN/1.73M2 — SIGNIFICANT CHANGE UP
ESTIMATED AVERAGE GLUCOSE: 103 — SIGNIFICANT CHANGE UP
ESTIMATED AVERAGE GLUCOSE: 103 — SIGNIFICANT CHANGE UP
GLUCOSE BLDC GLUCOMTR-MCNC: 107 MG/DL — HIGH (ref 70–99)
GLUCOSE BLDC GLUCOMTR-MCNC: 107 MG/DL — HIGH (ref 70–99)
GLUCOSE BLDC GLUCOMTR-MCNC: 117 MG/DL — HIGH (ref 70–99)
GLUCOSE BLDC GLUCOMTR-MCNC: 98 MG/DL — SIGNIFICANT CHANGE UP (ref 70–99)
GLUCOSE BLDC GLUCOMTR-MCNC: 98 MG/DL — SIGNIFICANT CHANGE UP (ref 70–99)
GLUCOSE SERPL-MCNC: 90 MG/DL — SIGNIFICANT CHANGE UP (ref 70–99)
GLUCOSE SERPL-MCNC: 90 MG/DL — SIGNIFICANT CHANGE UP (ref 70–99)
HCT VFR BLD CALC: 42.8 % — SIGNIFICANT CHANGE UP (ref 39–50)
HCT VFR BLD CALC: 42.8 % — SIGNIFICANT CHANGE UP (ref 39–50)
HGB BLD-MCNC: 14.2 G/DL — SIGNIFICANT CHANGE UP (ref 13–17)
HGB BLD-MCNC: 14.2 G/DL — SIGNIFICANT CHANGE UP (ref 13–17)
MAGNESIUM SERPL-MCNC: 2.1 MG/DL — SIGNIFICANT CHANGE UP (ref 1.6–2.6)
MAGNESIUM SERPL-MCNC: 2.1 MG/DL — SIGNIFICANT CHANGE UP (ref 1.6–2.6)
MCHC RBC-ENTMCNC: 27.3 PG — SIGNIFICANT CHANGE UP (ref 27–34)
MCHC RBC-ENTMCNC: 27.3 PG — SIGNIFICANT CHANGE UP (ref 27–34)
MCHC RBC-ENTMCNC: 33.2 GM/DL — SIGNIFICANT CHANGE UP (ref 32–36)
MCHC RBC-ENTMCNC: 33.2 GM/DL — SIGNIFICANT CHANGE UP (ref 32–36)
MCV RBC AUTO: 82.3 FL — SIGNIFICANT CHANGE UP (ref 80–100)
MCV RBC AUTO: 82.3 FL — SIGNIFICANT CHANGE UP (ref 80–100)
NRBC # BLD: 0 /100 WBCS — SIGNIFICANT CHANGE UP (ref 0–0)
NRBC # BLD: 0 /100 WBCS — SIGNIFICANT CHANGE UP (ref 0–0)
NRBC # FLD: 0 K/UL — SIGNIFICANT CHANGE UP (ref 0–0)
NRBC # FLD: 0 K/UL — SIGNIFICANT CHANGE UP (ref 0–0)
PHOSPHATE SERPL-MCNC: 3.5 MG/DL — SIGNIFICANT CHANGE UP (ref 2.5–4.5)
PHOSPHATE SERPL-MCNC: 3.5 MG/DL — SIGNIFICANT CHANGE UP (ref 2.5–4.5)
PLATELET # BLD AUTO: 177 K/UL — SIGNIFICANT CHANGE UP (ref 150–400)
PLATELET # BLD AUTO: 177 K/UL — SIGNIFICANT CHANGE UP (ref 150–400)
POTASSIUM SERPL-MCNC: 4 MMOL/L — SIGNIFICANT CHANGE UP (ref 3.5–5.3)
POTASSIUM SERPL-MCNC: 4 MMOL/L — SIGNIFICANT CHANGE UP (ref 3.5–5.3)
POTASSIUM SERPL-SCNC: 4 MMOL/L — SIGNIFICANT CHANGE UP (ref 3.5–5.3)
POTASSIUM SERPL-SCNC: 4 MMOL/L — SIGNIFICANT CHANGE UP (ref 3.5–5.3)
RBC # BLD: 5.2 M/UL — SIGNIFICANT CHANGE UP (ref 4.2–5.8)
RBC # BLD: 5.2 M/UL — SIGNIFICANT CHANGE UP (ref 4.2–5.8)
RBC # FLD: 14.8 % — HIGH (ref 10.3–14.5)
RBC # FLD: 14.8 % — HIGH (ref 10.3–14.5)
SODIUM SERPL-SCNC: 132 MMOL/L — LOW (ref 135–145)
SODIUM SERPL-SCNC: 132 MMOL/L — LOW (ref 135–145)
WBC # BLD: 4.35 K/UL — SIGNIFICANT CHANGE UP (ref 3.8–10.5)
WBC # BLD: 4.35 K/UL — SIGNIFICANT CHANGE UP (ref 3.8–10.5)
WBC # FLD AUTO: 4.35 K/UL — SIGNIFICANT CHANGE UP (ref 3.8–10.5)
WBC # FLD AUTO: 4.35 K/UL — SIGNIFICANT CHANGE UP (ref 3.8–10.5)

## 2023-11-18 PROCEDURE — 99232 SBSQ HOSP IP/OBS MODERATE 35: CPT

## 2023-11-18 RX ADMIN — ATORVASTATIN CALCIUM 40 MILLIGRAM(S): 80 TABLET, FILM COATED ORAL at 22:33

## 2023-11-18 RX ADMIN — Medication 200 MILLIGRAM(S): at 05:34

## 2023-11-18 RX ADMIN — LOSARTAN POTASSIUM 100 MILLIGRAM(S): 100 TABLET, FILM COATED ORAL at 05:34

## 2023-11-18 RX ADMIN — Medication 200 MILLIGRAM(S): at 18:21

## 2023-11-18 RX ADMIN — BUDESONIDE AND FORMOTEROL FUMARATE DIHYDRATE 2 PUFF(S): 160; 4.5 AEROSOL RESPIRATORY (INHALATION) at 10:14

## 2023-11-18 RX ADMIN — ARIPIPRAZOLE 5 MILLIGRAM(S): 15 TABLET ORAL at 13:13

## 2023-11-18 RX ADMIN — TIOTROPIUM BROMIDE 2 PUFF(S): 18 CAPSULE ORAL; RESPIRATORY (INHALATION) at 10:33

## 2023-11-18 RX ADMIN — Medication 50 MICROGRAM(S): at 05:34

## 2023-11-18 RX ADMIN — BUDESONIDE AND FORMOTEROL FUMARATE DIHYDRATE 2 PUFF(S): 160; 4.5 AEROSOL RESPIRATORY (INHALATION) at 21:33

## 2023-11-18 NOTE — PROGRESS NOTE ADULT - PROBLEM SELECTOR PLAN 2
With AH   - appreciate psych recommendations  - on long acting abilify, s/p dose yesterday  - haldol/ativan prn agitation Decompensated, p/w auditory hallucinations  - Start Abilify 5mg QD   - S/p BELLA Abilify 12/14  - Psych following   - haldol/ativan prn agitation
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
## 2023-11-18 NOTE — BH CONSULTATION LIAISON PROGRESS NOTE - NSBHASSESSMENTFT_PSY_ALL_CORE
Patient is a 58 years old male with the past hx of schizoaffective d/o, bipolar d/o, hx of multiple psych hospitalizations (last known in 2020 at [facility]), denies hx of aborted SA many years ago, denies drug or alcohol use, denies legal hx, ,  pt in treatment at [facility] AOPD with DR. Cook , and is on BELLA; and PMH HTN, T2DM (on metformin), CVID with hypogammaglobulinema, hx of MRSA infection in August, pt BIB self for paranoia. Currently paranoid, ideas of reference, AH, HI.    11/18: Pt still endorsing AH, denies any CAH. Paranoid delusion of  wanting to kill him. No SIIP in the hospital but has plan to OD on medication if he were to be discharged home, CANNOT leave AMA.     Recommendations:  -Observation 1:1 for psychosis  - Monitor Na, improving   - Continue Abilify 5 mg daily po  -Due for Abilify Maintenna 300 mg IM on 12/14. (Last dose received on 11/16/2023)  - CANNOT leave AMA due to safety concerns, pt endorsing plan to OD on medication if he were to go home due to paranoid delusions    For agitation:  Zyprexa 5 mg IM Q 4 Hours PRN  if QTC is <500  If QTC >500, give Lorazepam 1 mg Q 6hours IM/IV.  DO NOT give IM/IV benzos within ONE HOUR of IM zyprexa due to the potential respiratory suppression    DISPOSITION===TBD. Likely will need inpatient psych

## 2023-11-18 NOTE — PROGRESS NOTE ADULT - PROBLEM SELECTOR PLAN 1
Likely 2/2 polydypsia per history.  

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
## 2023-11-19 LAB
ALBUMIN SERPL ELPH-MCNC: 4.3 G/DL — SIGNIFICANT CHANGE UP (ref 3.3–5)
ALBUMIN SERPL ELPH-MCNC: 4.3 G/DL — SIGNIFICANT CHANGE UP (ref 3.3–5)
ALP SERPL-CCNC: 116 U/L — SIGNIFICANT CHANGE UP (ref 40–120)
ALP SERPL-CCNC: 116 U/L — SIGNIFICANT CHANGE UP (ref 40–120)
ALT FLD-CCNC: 25 U/L — SIGNIFICANT CHANGE UP (ref 4–41)
ALT FLD-CCNC: 25 U/L — SIGNIFICANT CHANGE UP (ref 4–41)
ANION GAP SERPL CALC-SCNC: 14 MMOL/L — SIGNIFICANT CHANGE UP (ref 7–14)
ANION GAP SERPL CALC-SCNC: 14 MMOL/L — SIGNIFICANT CHANGE UP (ref 7–14)
AST SERPL-CCNC: 38 U/L — SIGNIFICANT CHANGE UP (ref 4–40)
AST SERPL-CCNC: 38 U/L — SIGNIFICANT CHANGE UP (ref 4–40)
BILIRUB SERPL-MCNC: 0.3 MG/DL — SIGNIFICANT CHANGE UP (ref 0.2–1.2)
BILIRUB SERPL-MCNC: 0.3 MG/DL — SIGNIFICANT CHANGE UP (ref 0.2–1.2)
BUN SERPL-MCNC: 9 MG/DL — SIGNIFICANT CHANGE UP (ref 7–23)
BUN SERPL-MCNC: 9 MG/DL — SIGNIFICANT CHANGE UP (ref 7–23)
CALCIUM SERPL-MCNC: 9.5 MG/DL — SIGNIFICANT CHANGE UP (ref 8.4–10.5)
CALCIUM SERPL-MCNC: 9.5 MG/DL — SIGNIFICANT CHANGE UP (ref 8.4–10.5)
CHLORIDE SERPL-SCNC: 95 MMOL/L — LOW (ref 98–107)
CHLORIDE SERPL-SCNC: 95 MMOL/L — LOW (ref 98–107)
CO2 SERPL-SCNC: 26 MMOL/L — SIGNIFICANT CHANGE UP (ref 22–31)
CO2 SERPL-SCNC: 26 MMOL/L — SIGNIFICANT CHANGE UP (ref 22–31)
CREAT SERPL-MCNC: 0.78 MG/DL — SIGNIFICANT CHANGE UP (ref 0.5–1.3)
CREAT SERPL-MCNC: 0.78 MG/DL — SIGNIFICANT CHANGE UP (ref 0.5–1.3)
EGFR: 105 ML/MIN/1.73M2 — SIGNIFICANT CHANGE UP
EGFR: 105 ML/MIN/1.73M2 — SIGNIFICANT CHANGE UP
GLUCOSE BLDC GLUCOMTR-MCNC: 100 MG/DL — HIGH (ref 70–99)
GLUCOSE BLDC GLUCOMTR-MCNC: 105 MG/DL — HIGH (ref 70–99)
GLUCOSE BLDC GLUCOMTR-MCNC: 105 MG/DL — HIGH (ref 70–99)
GLUCOSE BLDC GLUCOMTR-MCNC: 131 MG/DL — HIGH (ref 70–99)
GLUCOSE BLDC GLUCOMTR-MCNC: 131 MG/DL — HIGH (ref 70–99)
GLUCOSE SERPL-MCNC: 84 MG/DL — SIGNIFICANT CHANGE UP (ref 70–99)
GLUCOSE SERPL-MCNC: 84 MG/DL — SIGNIFICANT CHANGE UP (ref 70–99)
HCT VFR BLD CALC: 41.7 % — SIGNIFICANT CHANGE UP (ref 39–50)
HCT VFR BLD CALC: 41.7 % — SIGNIFICANT CHANGE UP (ref 39–50)
HGB BLD-MCNC: 13.9 G/DL — SIGNIFICANT CHANGE UP (ref 13–17)
HGB BLD-MCNC: 13.9 G/DL — SIGNIFICANT CHANGE UP (ref 13–17)
MAGNESIUM SERPL-MCNC: 2 MG/DL — SIGNIFICANT CHANGE UP (ref 1.6–2.6)
MAGNESIUM SERPL-MCNC: 2 MG/DL — SIGNIFICANT CHANGE UP (ref 1.6–2.6)
MCHC RBC-ENTMCNC: 26.9 PG — LOW (ref 27–34)
MCHC RBC-ENTMCNC: 26.9 PG — LOW (ref 27–34)
MCHC RBC-ENTMCNC: 33.3 GM/DL — SIGNIFICANT CHANGE UP (ref 32–36)
MCHC RBC-ENTMCNC: 33.3 GM/DL — SIGNIFICANT CHANGE UP (ref 32–36)
MCV RBC AUTO: 80.7 FL — SIGNIFICANT CHANGE UP (ref 80–100)
MCV RBC AUTO: 80.7 FL — SIGNIFICANT CHANGE UP (ref 80–100)
NRBC # BLD: 0 /100 WBCS — SIGNIFICANT CHANGE UP (ref 0–0)
NRBC # BLD: 0 /100 WBCS — SIGNIFICANT CHANGE UP (ref 0–0)
NRBC # FLD: 0 K/UL — SIGNIFICANT CHANGE UP (ref 0–0)
NRBC # FLD: 0 K/UL — SIGNIFICANT CHANGE UP (ref 0–0)
PHOSPHATE SERPL-MCNC: 3.6 MG/DL — SIGNIFICANT CHANGE UP (ref 2.5–4.5)
PHOSPHATE SERPL-MCNC: 3.6 MG/DL — SIGNIFICANT CHANGE UP (ref 2.5–4.5)
PLATELET # BLD AUTO: 210 K/UL — SIGNIFICANT CHANGE UP (ref 150–400)
PLATELET # BLD AUTO: 210 K/UL — SIGNIFICANT CHANGE UP (ref 150–400)
POTASSIUM SERPL-MCNC: 4.3 MMOL/L — SIGNIFICANT CHANGE UP (ref 3.5–5.3)
POTASSIUM SERPL-MCNC: 4.3 MMOL/L — SIGNIFICANT CHANGE UP (ref 3.5–5.3)
POTASSIUM SERPL-SCNC: 4.3 MMOL/L — SIGNIFICANT CHANGE UP (ref 3.5–5.3)
POTASSIUM SERPL-SCNC: 4.3 MMOL/L — SIGNIFICANT CHANGE UP (ref 3.5–5.3)
PROT SERPL-MCNC: 6.9 G/DL — SIGNIFICANT CHANGE UP (ref 6–8.3)
PROT SERPL-MCNC: 6.9 G/DL — SIGNIFICANT CHANGE UP (ref 6–8.3)
RBC # BLD: 5.17 M/UL — SIGNIFICANT CHANGE UP (ref 4.2–5.8)
RBC # BLD: 5.17 M/UL — SIGNIFICANT CHANGE UP (ref 4.2–5.8)
RBC # FLD: 14.4 % — SIGNIFICANT CHANGE UP (ref 10.3–14.5)
RBC # FLD: 14.4 % — SIGNIFICANT CHANGE UP (ref 10.3–14.5)
SODIUM SERPL-SCNC: 135 MMOL/L — SIGNIFICANT CHANGE UP (ref 135–145)
SODIUM SERPL-SCNC: 135 MMOL/L — SIGNIFICANT CHANGE UP (ref 135–145)
WBC # BLD: 5.88 K/UL — SIGNIFICANT CHANGE UP (ref 3.8–10.5)
WBC # BLD: 5.88 K/UL — SIGNIFICANT CHANGE UP (ref 3.8–10.5)
WBC # FLD AUTO: 5.88 K/UL — SIGNIFICANT CHANGE UP (ref 3.8–10.5)
WBC # FLD AUTO: 5.88 K/UL — SIGNIFICANT CHANGE UP (ref 3.8–10.5)

## 2023-11-19 RX ORDER — HYDRALAZINE HCL 50 MG
2.5 TABLET ORAL ONCE
Refills: 0 | Status: COMPLETED | OUTPATIENT
Start: 2023-11-19 | End: 2023-11-19

## 2023-11-19 RX ADMIN — ATORVASTATIN CALCIUM 40 MILLIGRAM(S): 80 TABLET, FILM COATED ORAL at 22:08

## 2023-11-19 RX ADMIN — ARIPIPRAZOLE 5 MILLIGRAM(S): 15 TABLET ORAL at 12:28

## 2023-11-19 RX ADMIN — Medication 200 MILLIGRAM(S): at 07:16

## 2023-11-19 RX ADMIN — BUDESONIDE AND FORMOTEROL FUMARATE DIHYDRATE 2 PUFF(S): 160; 4.5 AEROSOL RESPIRATORY (INHALATION) at 09:28

## 2023-11-19 RX ADMIN — Medication 2.5 MILLIGRAM(S): at 22:19

## 2023-11-19 RX ADMIN — LOSARTAN POTASSIUM 100 MILLIGRAM(S): 100 TABLET, FILM COATED ORAL at 07:16

## 2023-11-19 RX ADMIN — Medication 50 MICROGRAM(S): at 07:15

## 2023-11-19 RX ADMIN — Medication 200 MILLIGRAM(S): at 18:04

## 2023-11-19 RX ADMIN — TIOTROPIUM BROMIDE 2 PUFF(S): 18 CAPSULE ORAL; RESPIRATORY (INHALATION) at 09:28

## 2023-11-19 RX ADMIN — BUDESONIDE AND FORMOTEROL FUMARATE DIHYDRATE 2 PUFF(S): 160; 4.5 AEROSOL RESPIRATORY (INHALATION) at 22:08

## 2023-11-19 NOTE — PROGRESS NOTE ADULT - PROBLEM SELECTOR PLAN 1
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

## 2023-11-19 NOTE — PROVIDER CONTACT NOTE (OTHER) - ASSESSMENT
repeated blood pressure after hydralazine 2.5 ivp given 201/109, patient remains calm and watching tv.
A&O4 patient sts no sob or chest pain
manually 170/110 denies pain, headache or dizziness. aert and calm
Asymptomatic

## 2023-11-19 NOTE — PROGRESS NOTE ADULT - PROBLEM SELECTOR PLAN 2
Decompensated, p/w auditory hallucinations  - Start Abilify 5mg QD   - S/p BELLA Abilify 12/14  - Psych following   - haldol/ativan prn agitation

## 2023-11-19 NOTE — PROGRESS NOTE ADULT - SUBJECTIVE AND OBJECTIVE BOX
Calm this morning  Eating breakfast  1:1 @ bedside    Vital Signs Last 24 Hrs  T(C): 37 (19 Nov 2023 05:29), Max: 37 (19 Nov 2023 05:29)  T(F): 98.6 (19 Nov 2023 05:29), Max: 98.6 (19 Nov 2023 05:29)  HR: 64 (19 Nov 2023 07:00) (55 - 66)  BP: 165/96 (19 Nov 2023 07:00) (144/66 - 188/96)  BP(mean): --  RR: 17 (19 Nov 2023 05:29) (17 - 18)  SpO2: 95% (19 Nov 2023 05:29) (95% - 100%)    I&O's Summary      Gen: NAD  Head: NCAT, EOMI  Lungs: CTA b/l  Heart: RRR, nl S1/S2, no murmurs  Abd: soft, NTND, NABS  Neuro: A+O x 2, grossly nonfocal    LABS:                        13.9   5.88  )-----------( 210      ( 19 Nov 2023 07:51 )             41.7     11-19    135  |  95<L>  |  9   ----------------------------<  84  4.3   |  26  |  0.78    Ca    9.5      19 Nov 2023 07:51  Phos  3.6     11-19  Mg     2.00     11-19    TPro  6.9  /  Alb  4.3  /  TBili  0.3  /  DBili  x   /  AST  38  /  ALT  25  /  AlkPhos  116  11-19      CAPILLARY BLOOD GLUCOSE      POCT Blood Glucose.: 131 mg/dL (19 Nov 2023 08:28)  POCT Blood Glucose.: 98 mg/dL (18 Nov 2023 23:33)  POCT Blood Glucose.: 117 mg/dL (18 Nov 2023 17:57)  POCT Blood Glucose.: 117 mg/dL (18 Nov 2023 12:24)  POCT Blood Glucose.: 107 mg/dL (18 Nov 2023 08:50)        Urinalysis Basic - ( 19 Nov 2023 07:51 )    Color: x / Appearance: x / SG: x / pH: x  Gluc: 84 mg/dL / Ketone: x  / Bili: x / Urobili: x   Blood: x / Protein: x / Nitrite: x   Leuk Esterase: x / RBC: x / WBC x   Sq Epi: x / Non Sq Epi: x / Bacteria: x        RADIOLOGY & ADDITIONAL TESTS:    Imaging Personally Reviewed:  [x] YES  [ ] NO    Case discussed with NPP:  [X] YES  [ ] NO

## 2023-11-20 LAB
ALBUMIN SERPL ELPH-MCNC: 4.4 G/DL — SIGNIFICANT CHANGE UP (ref 3.3–5)
ALBUMIN SERPL ELPH-MCNC: 4.4 G/DL — SIGNIFICANT CHANGE UP (ref 3.3–5)
ALP SERPL-CCNC: 117 U/L — SIGNIFICANT CHANGE UP (ref 40–120)
ALP SERPL-CCNC: 117 U/L — SIGNIFICANT CHANGE UP (ref 40–120)
ALT FLD-CCNC: 25 U/L — SIGNIFICANT CHANGE UP (ref 4–41)
ALT FLD-CCNC: 25 U/L — SIGNIFICANT CHANGE UP (ref 4–41)
ANION GAP SERPL CALC-SCNC: 11 MMOL/L — SIGNIFICANT CHANGE UP (ref 7–14)
ANION GAP SERPL CALC-SCNC: 11 MMOL/L — SIGNIFICANT CHANGE UP (ref 7–14)
AST SERPL-CCNC: 34 U/L — SIGNIFICANT CHANGE UP (ref 4–40)
AST SERPL-CCNC: 34 U/L — SIGNIFICANT CHANGE UP (ref 4–40)
BILIRUB SERPL-MCNC: 0.4 MG/DL — SIGNIFICANT CHANGE UP (ref 0.2–1.2)
BILIRUB SERPL-MCNC: 0.4 MG/DL — SIGNIFICANT CHANGE UP (ref 0.2–1.2)
BUN SERPL-MCNC: 10 MG/DL — SIGNIFICANT CHANGE UP (ref 7–23)
BUN SERPL-MCNC: 10 MG/DL — SIGNIFICANT CHANGE UP (ref 7–23)
CALCIUM SERPL-MCNC: 9.7 MG/DL — SIGNIFICANT CHANGE UP (ref 8.4–10.5)
CALCIUM SERPL-MCNC: 9.7 MG/DL — SIGNIFICANT CHANGE UP (ref 8.4–10.5)
CHLORIDE SERPL-SCNC: 93 MMOL/L — LOW (ref 98–107)
CHLORIDE SERPL-SCNC: 93 MMOL/L — LOW (ref 98–107)
CO2 SERPL-SCNC: 26 MMOL/L — SIGNIFICANT CHANGE UP (ref 22–31)
CO2 SERPL-SCNC: 26 MMOL/L — SIGNIFICANT CHANGE UP (ref 22–31)
CREAT SERPL-MCNC: 0.76 MG/DL — SIGNIFICANT CHANGE UP (ref 0.5–1.3)
CREAT SERPL-MCNC: 0.76 MG/DL — SIGNIFICANT CHANGE UP (ref 0.5–1.3)
EGFR: 105 ML/MIN/1.73M2 — SIGNIFICANT CHANGE UP
EGFR: 105 ML/MIN/1.73M2 — SIGNIFICANT CHANGE UP
GLUCOSE BLDC GLUCOMTR-MCNC: 101 MG/DL — HIGH (ref 70–99)
GLUCOSE BLDC GLUCOMTR-MCNC: 101 MG/DL — HIGH (ref 70–99)
GLUCOSE BLDC GLUCOMTR-MCNC: 113 MG/DL — HIGH (ref 70–99)
GLUCOSE BLDC GLUCOMTR-MCNC: 113 MG/DL — HIGH (ref 70–99)
GLUCOSE BLDC GLUCOMTR-MCNC: 124 MG/DL — HIGH (ref 70–99)
GLUCOSE BLDC GLUCOMTR-MCNC: 124 MG/DL — HIGH (ref 70–99)
GLUCOSE BLDC GLUCOMTR-MCNC: 96 MG/DL — SIGNIFICANT CHANGE UP (ref 70–99)
GLUCOSE BLDC GLUCOMTR-MCNC: 96 MG/DL — SIGNIFICANT CHANGE UP (ref 70–99)
GLUCOSE SERPL-MCNC: 89 MG/DL — SIGNIFICANT CHANGE UP (ref 70–99)
GLUCOSE SERPL-MCNC: 89 MG/DL — SIGNIFICANT CHANGE UP (ref 70–99)
HCT VFR BLD CALC: 41.6 % — SIGNIFICANT CHANGE UP (ref 39–50)
HCT VFR BLD CALC: 41.6 % — SIGNIFICANT CHANGE UP (ref 39–50)
HGB BLD-MCNC: 14.1 G/DL — SIGNIFICANT CHANGE UP (ref 13–17)
HGB BLD-MCNC: 14.1 G/DL — SIGNIFICANT CHANGE UP (ref 13–17)
MAGNESIUM SERPL-MCNC: 2 MG/DL — SIGNIFICANT CHANGE UP (ref 1.6–2.6)
MAGNESIUM SERPL-MCNC: 2 MG/DL — SIGNIFICANT CHANGE UP (ref 1.6–2.6)
MCHC RBC-ENTMCNC: 27.2 PG — SIGNIFICANT CHANGE UP (ref 27–34)
MCHC RBC-ENTMCNC: 27.2 PG — SIGNIFICANT CHANGE UP (ref 27–34)
MCHC RBC-ENTMCNC: 33.9 GM/DL — SIGNIFICANT CHANGE UP (ref 32–36)
MCHC RBC-ENTMCNC: 33.9 GM/DL — SIGNIFICANT CHANGE UP (ref 32–36)
MCV RBC AUTO: 80.2 FL — SIGNIFICANT CHANGE UP (ref 80–100)
MCV RBC AUTO: 80.2 FL — SIGNIFICANT CHANGE UP (ref 80–100)
NRBC # BLD: 0 /100 WBCS — SIGNIFICANT CHANGE UP (ref 0–0)
NRBC # BLD: 0 /100 WBCS — SIGNIFICANT CHANGE UP (ref 0–0)
NRBC # FLD: 0 K/UL — SIGNIFICANT CHANGE UP (ref 0–0)
NRBC # FLD: 0 K/UL — SIGNIFICANT CHANGE UP (ref 0–0)
OSMOLALITY UR: 262 MOSM/KG — SIGNIFICANT CHANGE UP (ref 50–1200)
OSMOLALITY UR: 262 MOSM/KG — SIGNIFICANT CHANGE UP (ref 50–1200)
PHOSPHATE SERPL-MCNC: 3.9 MG/DL — SIGNIFICANT CHANGE UP (ref 2.5–4.5)
PHOSPHATE SERPL-MCNC: 3.9 MG/DL — SIGNIFICANT CHANGE UP (ref 2.5–4.5)
PLATELET # BLD AUTO: 208 K/UL — SIGNIFICANT CHANGE UP (ref 150–400)
PLATELET # BLD AUTO: 208 K/UL — SIGNIFICANT CHANGE UP (ref 150–400)
POTASSIUM SERPL-MCNC: 4 MMOL/L — SIGNIFICANT CHANGE UP (ref 3.5–5.3)
POTASSIUM SERPL-MCNC: 4 MMOL/L — SIGNIFICANT CHANGE UP (ref 3.5–5.3)
POTASSIUM SERPL-SCNC: 4 MMOL/L — SIGNIFICANT CHANGE UP (ref 3.5–5.3)
POTASSIUM SERPL-SCNC: 4 MMOL/L — SIGNIFICANT CHANGE UP (ref 3.5–5.3)
PROT SERPL-MCNC: 6.9 G/DL — SIGNIFICANT CHANGE UP (ref 6–8.3)
PROT SERPL-MCNC: 6.9 G/DL — SIGNIFICANT CHANGE UP (ref 6–8.3)
RBC # BLD: 5.19 M/UL — SIGNIFICANT CHANGE UP (ref 4.2–5.8)
RBC # BLD: 5.19 M/UL — SIGNIFICANT CHANGE UP (ref 4.2–5.8)
RBC # FLD: 14.5 % — SIGNIFICANT CHANGE UP (ref 10.3–14.5)
RBC # FLD: 14.5 % — SIGNIFICANT CHANGE UP (ref 10.3–14.5)
SODIUM SERPL-SCNC: 130 MMOL/L — LOW (ref 135–145)
SODIUM SERPL-SCNC: 130 MMOL/L — LOW (ref 135–145)
WBC # BLD: 5.66 K/UL — SIGNIFICANT CHANGE UP (ref 3.8–10.5)
WBC # BLD: 5.66 K/UL — SIGNIFICANT CHANGE UP (ref 3.8–10.5)
WBC # FLD AUTO: 5.66 K/UL — SIGNIFICANT CHANGE UP (ref 3.8–10.5)
WBC # FLD AUTO: 5.66 K/UL — SIGNIFICANT CHANGE UP (ref 3.8–10.5)

## 2023-11-20 PROCEDURE — 99232 SBSQ HOSP IP/OBS MODERATE 35: CPT

## 2023-11-20 RX ORDER — AMLODIPINE BESYLATE 2.5 MG/1
10 TABLET ORAL DAILY
Refills: 0 | Status: DISCONTINUED | OUTPATIENT
Start: 2023-11-20 | End: 2023-11-24

## 2023-11-20 RX ADMIN — TIOTROPIUM BROMIDE 2 PUFF(S): 18 CAPSULE ORAL; RESPIRATORY (INHALATION) at 09:18

## 2023-11-20 RX ADMIN — ATORVASTATIN CALCIUM 40 MILLIGRAM(S): 80 TABLET, FILM COATED ORAL at 21:43

## 2023-11-20 RX ADMIN — Medication 50 MICROGRAM(S): at 05:43

## 2023-11-20 RX ADMIN — AMLODIPINE BESYLATE 10 MILLIGRAM(S): 2.5 TABLET ORAL at 07:46

## 2023-11-20 RX ADMIN — Medication 200 MILLIGRAM(S): at 17:37

## 2023-11-20 RX ADMIN — BUDESONIDE AND FORMOTEROL FUMARATE DIHYDRATE 2 PUFF(S): 160; 4.5 AEROSOL RESPIRATORY (INHALATION) at 09:18

## 2023-11-20 RX ADMIN — LOSARTAN POTASSIUM 100 MILLIGRAM(S): 100 TABLET, FILM COATED ORAL at 05:43

## 2023-11-20 RX ADMIN — ARIPIPRAZOLE 5 MILLIGRAM(S): 15 TABLET ORAL at 12:49

## 2023-11-20 RX ADMIN — Medication 200 MILLIGRAM(S): at 05:43

## 2023-11-20 NOTE — CHART NOTE - NSCHARTNOTEFT_GEN_A_CORE
Called by RN for elevated BP overnight. Earlier /95 received 6pm dose Labetalol 200mg po  --> rechecked in  1 hr still elevated -> Hydralazine 2.5mg IVP x 1 ordered, still elevated pt asx--> HCTZ 25mg po x 1 ordered - BP improved, rest of VS stable, pt seen and assessed at bedside, comfortable, offers no complaints, will c/t monitor, consider additional antihypertensive agent for better BP control. Called by RN for elevated BP overnight. Earlier /95 received 6pm dose Labetalol 200mg po  --> rechecked in  1 hr still elevated -> Hydralazine 2.5mg IVP x 1 ordered, still elevated pt asx--> HCTZ 25mg po x 1 ordered - BP improved, rest of VS stable, pt seen and assessed at bedside, comfortable, offers no complaints, will c/t monitor, consider additional antihypertensive agent for better BP control or increase Labetalol dose.

## 2023-11-20 NOTE — BH CONSULTATION LIAISON PROGRESS NOTE - NSBHASSESSMENTFT_PSY_ALL_CORE
Patient is a 58 years old male with the past hx of schizoaffective d/o, bipolar d/o, hx of multiple psych hospitalizations (last known in 2020 at [facility]), denies hx of aborted SA many years ago, denies drug or alcohol use, denies legal hx, ,  pt in treatment at [facility] AOPD with DR. Cook , and is on BELLA; and PMH HTN, T2DM (on metformin), CVID with hypogammaglobulinema, hx of MRSA infection in August, pt BIB self for paranoia. Currently paranoid, ideas of reference, AH, HI.    11/18: Pt still endorsing AH, denies any CAH. Paranoid delusion of  wanting to kill him. No SIIP in the hospital but has plan to OD on medication if he were to be discharged home, CANNOT leave AMA.   11/20: Pt is improving, has better insight and judgement, no HI, less preoccupied with psychotic symptoms.    Recommendations:  - No need to 1:1 observation for psychiatric reasons.  - Monitor Na, improving   - Monitor water intake (might suffer from psychogenic polydipsia, which could one of the causes be affecting his Na levels)  - Continue Abilify 5 mg daily po  -Due for Abilify Maintenna 300 mg IM on 12/14. (Last dose received on 11/16/2023)  - CANNOT leave AMA due to safety concerns, pending psych stabilization    For agitation:  Zyprexa 5 mg IM Q 4 Hours PRN  if QTC is <500  If QTC >500, give Lorazepam 1 mg Q 6hours IM/IV.  DO NOT give IM/IV benzos within ONE HOUR of IM zyprexa due to the potential respiratory suppression    DISPOSITION===TBD.  Patient is a 58 years old male with the past hx of schizoaffective d/o, bipolar d/o, hx of multiple psych hospitalizations (last known in 2020 at [facility]), denies hx of aborted SA many years ago, denies drug or alcohol use, denies legal hx, ,  pt in treatment at [facility] AOPD with DR. Cook , and is on BELLA; and PMH HTN, T2DM (on metformin), CVID with hypogammaglobulinema, hx of MRSA infection in August, pt BIB self for paranoia. Currently paranoid, ideas of reference, AH, HI.    11/18: Pt still endorsing AH, denies any CAH. Paranoid delusion of  wanting to kill him. No SIIP in the hospital but has plan to OD on medication if he were to be discharged home, CANNOT leave AMA.   11/20: Pt is improving, has better insight and judgement, no HI, less preoccupied with psychotic symptoms.    Recommendations:  - Continue 1:1 observation for psychiatric reasons.  - Monitor Na,  - Monitor water intake (might suffer from psychogenic polydipsia, which could be one of the causes be affecting his Na levels)  - Continue Abilify 5 mg daily po  -Due for Abilify Maintenna 300 mg IM on 12/14. (Last dose received on 11/16/2023)  - CANNOT leave AMA due to safety concerns, pending psych stabilization    For agitation:  Zyprexa 5 mg IM Q 4 Hours PRN  if QTC is <500  If QTC >500, give Lorazepam 1 mg Q 6hours IM/IV.  DO NOT give IM/IV benzos within ONE HOUR of IM zyprexa due to the potential respiratory suppression    DISPOSITION===TBD.  Patient is a 58 years old male with the past hx of schizoaffective d/o, bipolar d/o, hx of multiple psych hospitalizations (last known in 2020 at [facility]), denies hx of aborted SA many years ago, denies drug or alcohol use, denies legal hx, ,  pt in treatment at [facility] AOPD with DR. Cook , and is on BELLA; and PMH HTN, T2DM (on metformin), CVID with hypogammaglobulinema, hx of MRSA infection in August, pt BIB self for paranoia. Currently paranoid, ideas of reference, AH, HI.    11/18: Pt still endorsing AH, denies any CAH. Paranoid delusion of  wanting to kill him. No SIIP in the hospital but has plan to OD on medication if he were to be discharged home, CANNOT leave AMA.   11/20: Pt is improving, has better insight and judgement, no HI, less preoccupied with psychotic symptoms.    Recommendations:  - Continue 1:1 observation for psychosis  - Monitor Na,  - Monitor water intake (might suffer from psychogenic polydipsia, which could be one of the causes be affecting his Na levels)  - Continue Abilify 5 mg daily po  -Due for Abilify Maintenna 300 mg IM on 12/14. (Last dose received on 11/16/2023)  - CANNOT leave AMA due to safety concerns, pending psych stabilization    For agitation:  Zyprexa 5 mg IM Q 4 Hours PRN  if QTC is <500  If QTC >500, give Lorazepam 1 mg Q 6hours IM/IV.  DO NOT give IM/IV benzos within ONE HOUR of IM zyprexa due to the potential respiratory suppression    DISPOSITION===TBD.  Patient is a 58 years old male with the past hx of schizoaffective d/o, bipolar d/o, hx of multiple psych hospitalizations (last known in 2020 at [facility]), denies hx of aborted SA many years ago, denies drug or alcohol use, denies legal hx, ,  pt in treatment at [facility] AOPD with DR. Cook , and is on BELLA; and PMH HTN, T2DM (on metformin), CVID with hypogammaglobulinema, hx of MRSA infection in August, pt BIB self for paranoia. Currently paranoid, ideas of reference, AH, HI.    11/18: Pt still endorsing AH, denies any CAH. Paranoid delusion of  wanting to kill him. No SIIP in the hospital but has plan to OD on medication if he were to be discharged home, CANNOT leave AMA.   11/20: Pt is improving, has better insight and judgement, no HI, less preoccupied with psychotic symptoms.    Recommendations:  - Continue 1:1 observation for psychosis for one more day given recent HI and continuous AH.  - Monitor Na,  - Monitor water intake (might suffer from psychogenic polydipsia, which could be one of the causes be affecting his Na levels)  - Continue Abilify 5 mg daily po  -Due for Abilify Maintenna 300 mg IM on 12/14. (Last dose received on 11/16/2023)  - CANNOT leave AMA due to safety concerns, pending psych stabilization    For agitation:  Zyprexa 5 mg IM Q 4 Hours PRN  if QTC is <500  If QTC >500, give Lorazepam 1 mg Q 6hours IM/IV.  DO NOT give IM/IV benzos within ONE HOUR of IM zyprexa due to the potential respiratory suppression    DISPOSITION===TBD.

## 2023-11-20 NOTE — BH CONSULTATION LIAISON PROGRESS NOTE - NSBHATTESTCOMMENTATTENDFT_PSY_A_CORE
met with the patient along with fellow md, impression and plan discussed and agreed upon
Handoff received from Dr. Espinoza, chart reviewed, pt. seen/evaluated with Dr. Roa, I agree with above assessment/plan. Patient oriented/cooperative, reports ongoing vague AH, denies CAH, denies voices are distressing to him, denies si and hi.  Plan as above, will follow

## 2023-11-20 NOTE — BH CONSULTATION LIAISON PROGRESS NOTE - NSBHCONSULTMEDAGITATION_PSY_A_CORE FT
For agitation:  Zyprexa 5 mg IM Q 4 Hours PRN  if QTC is <500  If QTC >500, give Lorazepam 1 mg Q 6hours IM/IV.  DO NOT give IM/IV benzos within ONE HOUR of IM zyprexa due to the potential respiratory suppression     As above

## 2023-11-20 NOTE — BH CONSULTATION LIAISON PROGRESS NOTE - NSBHFUPINTERVALHXFT_PSY_A_CORE
Chart, labs, imagine reviewed. Case discussed with the primary team. Patient seen at bedside.   Patient is calm, cooperative, pleasant, although with a flat affect. He reported that he continues to hear voices, however appeared less preoccupied with them and even considers the possibility of " all of that just in my head", which indicates improved insight. He reported that the  possibly came to the hospital looking for christina, but at the same time feels safe in the hospital and does not feel threatened by staff and other patients. Patient denied suicidal or homicidal ideations, intent or a plan. He is not looking anymore to go after the  whose voice he is hearing. Reported poor sleep, however, attributes it to " being less active in the hospital", and " not spending my energy." Denied voices bothering him at night. Pt reported " drinking too much water at home", about 2 gallons a day and reported that he " cut down 2 hours ago". Chart, labs, reviewed. Case discussed with the primary team. Patient seen at bedside.   Patient is calm, cooperative, pleasant, although with a flat affect. He reported that he continues to hear voices, however appeared less preoccupied with them and even considers the possibility of " all of that just in my head", which indicates improved insight. He reported that the  possibly came to the hospital looking for him, but at the same time feels safe in the hospital and does not feel threatened by staff and other patients. Patient denied suicidal or homicidal ideations, intent or a plan. He is not looking anymore to go after anyone whose voice he is hearing. Reported poor sleep, however, attributes it to " being less active in the hospital", and " not spending my energy." Denied voices bothering him at night. Denies CAH.  Pt reported " drinking too much water at home", about 2 gallons a day and reported that he " cut down 2 hours ago".

## 2023-11-21 LAB
GLUCOSE BLDC GLUCOMTR-MCNC: 100 MG/DL — HIGH (ref 70–99)
GLUCOSE BLDC GLUCOMTR-MCNC: 100 MG/DL — HIGH (ref 70–99)
GLUCOSE BLDC GLUCOMTR-MCNC: 105 MG/DL — HIGH (ref 70–99)
GLUCOSE BLDC GLUCOMTR-MCNC: 105 MG/DL — HIGH (ref 70–99)
GLUCOSE BLDC GLUCOMTR-MCNC: 115 MG/DL — HIGH (ref 70–99)
GLUCOSE BLDC GLUCOMTR-MCNC: 115 MG/DL — HIGH (ref 70–99)
GLUCOSE BLDC GLUCOMTR-MCNC: 92 MG/DL — SIGNIFICANT CHANGE UP (ref 70–99)
GLUCOSE BLDC GLUCOMTR-MCNC: 92 MG/DL — SIGNIFICANT CHANGE UP (ref 70–99)

## 2023-11-21 RX ADMIN — Medication 50 MICROGRAM(S): at 06:06

## 2023-11-21 RX ADMIN — LOSARTAN POTASSIUM 100 MILLIGRAM(S): 100 TABLET, FILM COATED ORAL at 06:06

## 2023-11-21 RX ADMIN — Medication 200 MILLIGRAM(S): at 18:21

## 2023-11-21 RX ADMIN — ARIPIPRAZOLE 5 MILLIGRAM(S): 15 TABLET ORAL at 11:34

## 2023-11-21 RX ADMIN — BUDESONIDE AND FORMOTEROL FUMARATE DIHYDRATE 2 PUFF(S): 160; 4.5 AEROSOL RESPIRATORY (INHALATION) at 07:55

## 2023-11-21 RX ADMIN — BUDESONIDE AND FORMOTEROL FUMARATE DIHYDRATE 2 PUFF(S): 160; 4.5 AEROSOL RESPIRATORY (INHALATION) at 21:38

## 2023-11-21 RX ADMIN — TIOTROPIUM BROMIDE 2 PUFF(S): 18 CAPSULE ORAL; RESPIRATORY (INHALATION) at 10:20

## 2023-11-21 RX ADMIN — AMLODIPINE BESYLATE 10 MILLIGRAM(S): 2.5 TABLET ORAL at 06:06

## 2023-11-21 RX ADMIN — Medication 200 MILLIGRAM(S): at 06:05

## 2023-11-21 RX ADMIN — ATORVASTATIN CALCIUM 40 MILLIGRAM(S): 80 TABLET, FILM COATED ORAL at 21:38

## 2023-11-21 NOTE — PROGRESS NOTE ADULT - SUBJECTIVE AND OBJECTIVE BOX
Calm this morning  Tolerating diet  No headaches or nausea  1:1 @ bedside    Vital Signs Last 24 Hrs  T(C): 36.4 (21 Nov 2023 11:19), Max: 36.7 (20 Nov 2023 17:30)  T(F): 97.6 (21 Nov 2023 11:19), Max: 98.1 (20 Nov 2023 17:30)  HR: 54 (21 Nov 2023 11:19) (54 - 63)  BP: 119/64 (21 Nov 2023 11:19) (119/64 - 175/76)  BP(mean): 104 (20 Nov 2023 12:03) (104 - 104)  RR: 18 (21 Nov 2023 06:00) (18 - 18)  SpO2: 98% (21 Nov 2023 11:19) (96% - 99%)    I&O's Summary      Gen: NAD  Head: NCAT, EOMI  Lungs: CTA b/l  Heart: RRR, nl S1/S2, no murmurs  Abd: soft, NTND, NABS  Neuro: A+O x 2, grossly nonfocal      LABS:                        14.1   5.66  )-----------( 208      ( 20 Nov 2023 05:36 )             41.6     11-20    130<L>  |  93<L>  |  10  ----------------------------<  89  4.0   |  26  |  0.76    Ca    9.7      20 Nov 2023 05:36  Phos  3.9     11-20  Mg     2.00     11-20    TPro  6.9  /  Alb  4.4  /  TBili  0.4  /  DBili  x   /  AST  34  /  ALT  25  /  AlkPhos  117  11-20      CAPILLARY BLOOD GLUCOSE      POCT Blood Glucose.: 115 mg/dL (21 Nov 2023 11:13)  POCT Blood Glucose.: 105 mg/dL (21 Nov 2023 07:54)  POCT Blood Glucose.: 124 mg/dL (20 Nov 2023 21:30)  POCT Blood Glucose.: 96 mg/dL (20 Nov 2023 17:33)  POCT Blood Glucose.: 113 mg/dL (20 Nov 2023 11:58)        Urinalysis Basic - ( 20 Nov 2023 05:36 )    Color: x / Appearance: x / SG: x / pH: x  Gluc: 89 mg/dL / Ketone: x  / Bili: x / Urobili: x   Blood: x / Protein: x / Nitrite: x   Leuk Esterase: x / RBC: x / WBC x   Sq Epi: x / Non Sq Epi: x / Bacteria: x        RADIOLOGY & ADDITIONAL TESTS:    Imaging Personally Reviewed:  [x] YES  [ ] NO    Case discussed with NPP:  [X] YES  [ ] NO

## 2023-11-21 NOTE — PROGRESS NOTE ADULT - PROBLEM SELECTOR PLAN 2
Decompensated, p/w auditory hallucinations  - Start Abilify 5mg QD   - Due for Abilify Maintenna 300 mg IM on 12/14. (Last dose received on 11/16/2023)  - Psych following   - haldol/ativan prn agitation

## 2023-11-21 NOTE — PROGRESS NOTE ADULT - PROBLEM SELECTOR PLAN 1
Likely 2/2 psychogenic polydipsia per history, improving with appropriate fluid restriction here   - Daily BMP check   - Encouraged patient to only drink water when he is thirsty  - Cont fluid restriction

## 2023-11-22 LAB
ANION GAP SERPL CALC-SCNC: 15 MMOL/L — HIGH (ref 7–14)
ANION GAP SERPL CALC-SCNC: 15 MMOL/L — HIGH (ref 7–14)
BUN SERPL-MCNC: 13 MG/DL — SIGNIFICANT CHANGE UP (ref 7–23)
BUN SERPL-MCNC: 13 MG/DL — SIGNIFICANT CHANGE UP (ref 7–23)
CALCIUM SERPL-MCNC: 9.6 MG/DL — SIGNIFICANT CHANGE UP (ref 8.4–10.5)
CALCIUM SERPL-MCNC: 9.6 MG/DL — SIGNIFICANT CHANGE UP (ref 8.4–10.5)
CHLORIDE SERPL-SCNC: 97 MMOL/L — LOW (ref 98–107)
CHLORIDE SERPL-SCNC: 97 MMOL/L — LOW (ref 98–107)
CO2 SERPL-SCNC: 21 MMOL/L — LOW (ref 22–31)
CO2 SERPL-SCNC: 21 MMOL/L — LOW (ref 22–31)
CREAT SERPL-MCNC: 0.8 MG/DL — SIGNIFICANT CHANGE UP (ref 0.5–1.3)
CREAT SERPL-MCNC: 0.8 MG/DL — SIGNIFICANT CHANGE UP (ref 0.5–1.3)
EGFR: 104 ML/MIN/1.73M2 — SIGNIFICANT CHANGE UP
EGFR: 104 ML/MIN/1.73M2 — SIGNIFICANT CHANGE UP
GLUCOSE BLDC GLUCOMTR-MCNC: 105 MG/DL — HIGH (ref 70–99)
GLUCOSE BLDC GLUCOMTR-MCNC: 105 MG/DL — HIGH (ref 70–99)
GLUCOSE BLDC GLUCOMTR-MCNC: 116 MG/DL — HIGH (ref 70–99)
GLUCOSE BLDC GLUCOMTR-MCNC: 116 MG/DL — HIGH (ref 70–99)
GLUCOSE BLDC GLUCOMTR-MCNC: 95 MG/DL — SIGNIFICANT CHANGE UP (ref 70–99)
GLUCOSE BLDC GLUCOMTR-MCNC: 95 MG/DL — SIGNIFICANT CHANGE UP (ref 70–99)
GLUCOSE BLDC GLUCOMTR-MCNC: 97 MG/DL — SIGNIFICANT CHANGE UP (ref 70–99)
GLUCOSE BLDC GLUCOMTR-MCNC: 97 MG/DL — SIGNIFICANT CHANGE UP (ref 70–99)
GLUCOSE SERPL-MCNC: 85 MG/DL — SIGNIFICANT CHANGE UP (ref 70–99)
GLUCOSE SERPL-MCNC: 85 MG/DL — SIGNIFICANT CHANGE UP (ref 70–99)
HCT VFR BLD CALC: 42.7 % — SIGNIFICANT CHANGE UP (ref 39–50)
HCT VFR BLD CALC: 42.7 % — SIGNIFICANT CHANGE UP (ref 39–50)
HGB BLD-MCNC: 14.2 G/DL — SIGNIFICANT CHANGE UP (ref 13–17)
HGB BLD-MCNC: 14.2 G/DL — SIGNIFICANT CHANGE UP (ref 13–17)
MAGNESIUM SERPL-MCNC: 2 MG/DL — SIGNIFICANT CHANGE UP (ref 1.6–2.6)
MAGNESIUM SERPL-MCNC: 2 MG/DL — SIGNIFICANT CHANGE UP (ref 1.6–2.6)
MCHC RBC-ENTMCNC: 27.1 PG — SIGNIFICANT CHANGE UP (ref 27–34)
MCHC RBC-ENTMCNC: 27.1 PG — SIGNIFICANT CHANGE UP (ref 27–34)
MCHC RBC-ENTMCNC: 33.3 GM/DL — SIGNIFICANT CHANGE UP (ref 32–36)
MCHC RBC-ENTMCNC: 33.3 GM/DL — SIGNIFICANT CHANGE UP (ref 32–36)
MCV RBC AUTO: 81.5 FL — SIGNIFICANT CHANGE UP (ref 80–100)
MCV RBC AUTO: 81.5 FL — SIGNIFICANT CHANGE UP (ref 80–100)
NRBC # BLD: 0 /100 WBCS — SIGNIFICANT CHANGE UP (ref 0–0)
NRBC # BLD: 0 /100 WBCS — SIGNIFICANT CHANGE UP (ref 0–0)
NRBC # FLD: 0 K/UL — SIGNIFICANT CHANGE UP (ref 0–0)
NRBC # FLD: 0 K/UL — SIGNIFICANT CHANGE UP (ref 0–0)
PHOSPHATE SERPL-MCNC: 3.5 MG/DL — SIGNIFICANT CHANGE UP (ref 2.5–4.5)
PHOSPHATE SERPL-MCNC: 3.5 MG/DL — SIGNIFICANT CHANGE UP (ref 2.5–4.5)
PLATELET # BLD AUTO: 187 K/UL — SIGNIFICANT CHANGE UP (ref 150–400)
PLATELET # BLD AUTO: 187 K/UL — SIGNIFICANT CHANGE UP (ref 150–400)
POTASSIUM SERPL-MCNC: 4.5 MMOL/L — SIGNIFICANT CHANGE UP (ref 3.5–5.3)
POTASSIUM SERPL-MCNC: 4.5 MMOL/L — SIGNIFICANT CHANGE UP (ref 3.5–5.3)
POTASSIUM SERPL-SCNC: 4.5 MMOL/L — SIGNIFICANT CHANGE UP (ref 3.5–5.3)
POTASSIUM SERPL-SCNC: 4.5 MMOL/L — SIGNIFICANT CHANGE UP (ref 3.5–5.3)
RBC # BLD: 5.24 M/UL — SIGNIFICANT CHANGE UP (ref 4.2–5.8)
RBC # BLD: 5.24 M/UL — SIGNIFICANT CHANGE UP (ref 4.2–5.8)
RBC # FLD: 14.7 % — HIGH (ref 10.3–14.5)
RBC # FLD: 14.7 % — HIGH (ref 10.3–14.5)
SODIUM SERPL-SCNC: 133 MMOL/L — LOW (ref 135–145)
SODIUM SERPL-SCNC: 133 MMOL/L — LOW (ref 135–145)
WBC # BLD: 4.7 K/UL — SIGNIFICANT CHANGE UP (ref 3.8–10.5)
WBC # BLD: 4.7 K/UL — SIGNIFICANT CHANGE UP (ref 3.8–10.5)
WBC # FLD AUTO: 4.7 K/UL — SIGNIFICANT CHANGE UP (ref 3.8–10.5)
WBC # FLD AUTO: 4.7 K/UL — SIGNIFICANT CHANGE UP (ref 3.8–10.5)

## 2023-11-22 PROCEDURE — 99231 SBSQ HOSP IP/OBS SF/LOW 25: CPT

## 2023-11-22 RX ADMIN — Medication 200 MILLIGRAM(S): at 06:00

## 2023-11-22 RX ADMIN — BUDESONIDE AND FORMOTEROL FUMARATE DIHYDRATE 2 PUFF(S): 160; 4.5 AEROSOL RESPIRATORY (INHALATION) at 21:34

## 2023-11-22 RX ADMIN — ARIPIPRAZOLE 5 MILLIGRAM(S): 15 TABLET ORAL at 11:55

## 2023-11-22 RX ADMIN — BUDESONIDE AND FORMOTEROL FUMARATE DIHYDRATE 2 PUFF(S): 160; 4.5 AEROSOL RESPIRATORY (INHALATION) at 11:54

## 2023-11-22 RX ADMIN — LOSARTAN POTASSIUM 100 MILLIGRAM(S): 100 TABLET, FILM COATED ORAL at 06:00

## 2023-11-22 RX ADMIN — Medication 200 MILLIGRAM(S): at 17:10

## 2023-11-22 RX ADMIN — TIOTROPIUM BROMIDE 2 PUFF(S): 18 CAPSULE ORAL; RESPIRATORY (INHALATION) at 12:38

## 2023-11-22 RX ADMIN — AMLODIPINE BESYLATE 10 MILLIGRAM(S): 2.5 TABLET ORAL at 06:00

## 2023-11-22 RX ADMIN — Medication 50 MICROGRAM(S): at 06:00

## 2023-11-22 RX ADMIN — ATORVASTATIN CALCIUM 40 MILLIGRAM(S): 80 TABLET, FILM COATED ORAL at 21:40

## 2023-11-22 NOTE — BH CONSULTATION LIAISON PROGRESS NOTE - NSBHASSESSMENTFT_PSY_ALL_CORE
Patient is a 58 years old male with the past hx of schizoaffective d/o, bipolar d/o, hx of multiple psych hospitalizations (last known in 2020 at [facility]), denies hx of aborted SA many years ago, denies drug or alcohol use, denies legal hx, ,  pt in treatment at [facility] AOPD with DR. Cook , and is on BELLA; and PMH HTN, T2DM (on metformin), CVID with hypogammaglobulinema, hx of MRSA infection in August, pt BIB self for paranoia. Currently paranoid, ideas of reference, AH, HI.    11/18: Pt still endorsing AH, denies any CAH. Paranoid delusion of  wanting to kill him. No SIIP in the hospital but has plan to OD on medication if he were to be discharged home, CANNOT leave AMA.   11/20: Pt is improving, has better insight and judgement, no HI, less preoccupied with psychotic symptoms.    Recommendations:  - Continue 1:1 observation for psychosis for one more day given recent HI and continuous AH.  - Monitor Na,  - Monitor water intake (might suffer from psychogenic polydipsia, which could be one of the causes be affecting his Na levels)  - Continue Abilify 5 mg daily po  -Due for Abilify Maintenna 300 mg IM on 12/14. (Last dose received on 11/16/2023)  - CANNOT leave AMA due to safety concerns, pending psych stabilization    For agitation:  Zyprexa 5 mg IM Q 4 Hours PRN  if QTC is <500  If QTC >500, give Lorazepam 1 mg Q 6hours IM/IV.  DO NOT give IM/IV benzos within ONE HOUR of IM zyprexa due to the potential respiratory suppression    DISPOSITION===TBD.   Patient is a 58 years old male with the past hx of schizoaffective d/o, bipolar d/o, hx of multiple psych hospitalizations (last known in 2020 at [facility]), denies hx of aborted SA many years ago, denies drug or alcohol use, denies legal hx, ,  pt in treatment at [facility] AOPD with DR. Cook , and is on BELLA; and PMH HTN, T2DM (on metformin), CVID with hypogammaglobulinema, hx of MRSA infection in August, pt BIB self for paranoia. Currently paranoid, ideas of reference, AH, HI.    11/18: Pt still endorsing AH, denies any CAH. Paranoid delusion of  wanting to kill him. No SIIP in the hospital but has plan to OD on medication if he were to be discharged home, CANNOT leave AMA.   11/20: Pt is improving, has better insight and judgement, no HI, less preoccupied with psychotic symptoms.  11/22--- denies any ah today, calm.    Recommendations:  - Continue 1:1 observation for psychosis for one more day given recent HI and continuous AH.  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 (Last dose received on 11/16/2023)  - CANNOT leave AMA due to safety concerns, pending psych stabilization    For agitation:  Zyprexa 5 mg IM Q 4 Hours PRN  if QTC is <500  If QTC >500, give Lorazepam 1 mg Q 6hours IM/IV.  DO NOT give IM/IV benzos within ONE HOUR of IM zyprexa due to the potential respiratory suppression    DISPOSITION===TBD. Will finalize on Friday. Anticipating d/c home with outpatient psych f/u at [facility]   Patient is a 58 years old male with the past hx of schizoaffective d/o, bipolar d/o, hx of multiple psych hospitalizations (last known in 2020 at [facility]), denies hx of aborted SA many years ago, denies drug or alcohol use, denies legal hx, ,  pt in treatment at [facility] AOPD with DR. Cook , and is on BELLA; and PMH HTN, T2DM (on metformin), CVID with hypogammaglobulinema, hx of MRSA infection in August, pt BIB self for paranoia. Currently paranoid, ideas of reference, AH, HI.    11/18: Pt still endorsing AH, denies any CAH. Paranoid delusion of  wanting to kill him. No SIIP in the hospital but has plan to OD on medication if he were to be discharged home, CANNOT leave AMA.   11/20: Pt is improving, has better insight and judgement, no HI, less preoccupied with psychotic symptoms.  11/22--- denies any ah today, calm.    Recommendations:  - No indication for a 1:1  - Monitor Na,  - Monitor water intake (might suffer from psychogenic polydipsia, which could be one of the causes be affecting his Na levels)  - Continue Abilify 5 mg daily po  - Due for Abilify Maintenna 300 mg IM on 12/14. (Last dose received on 11/16/2023)  - CANNOT leave AMA due to safety concerns, pending psych stabilization    For agitation:  Zyprexa 5 mg IM Q 4 Hours PRN  if QTC is <500  If QTC >500, give Lorazepam 1 mg Q 6hours IM/IV.  DO NOT give IM/IV benzos within ONE HOUR of IM zyprexa due to the potential respiratory suppression    DISPOSITION===TBD. Will finalize on Friday. Anticipating d/c home with outpatient psych f/u at [facility]

## 2023-11-22 NOTE — PROGRESS NOTE ADULT - PROBLEM SELECTOR PLAN 2
Decompensated, p/w auditory hallucinations  - Start Abilify 5mg QD   - Due for Abilify Maintenna 300 mg IM on 12/14. (Last dose received on 11/16/2023)  - Psych following   - haldol/ativan prn agitation  - remain calm and symptoms improving. maybe okay with home with outpt psyc clinic follow up

## 2023-11-22 NOTE — PROGRESS NOTE ADULT - PROBLEM SELECTOR PLAN 1
Likely 2/2 psychogenic polydipsia per history, improving with appropriate fluid restriction here   - Daily BMP, stable Na  - Encouraged patient to only drink water when he is thirsty  - Cont fluid restriction

## 2023-11-22 NOTE — BH CONSULTATION LIAISON PROGRESS NOTE - NSBHFUPINTERVALHXFT_PSY_A_CORE
Met with the patient. Alert and oriented x3. Today denies any ah when asked - ' I heard last was yesterday morning', none today. Calm, engages. Denies any si or hi, denies any mood symptoms, denies any delusions.

## 2023-11-22 NOTE — BH CONSULTATION LIAISON PROGRESS NOTE - NSBHFUPINTERVALCCFT_PSY_A_CORE
Behaviorally has been under control on [address]. Safety maintained  Compliant  Care coordinated with medicine team this morning-- below discussed

## 2023-11-22 NOTE — PROGRESS NOTE ADULT - SUBJECTIVE AND OBJECTIVE BOX
SUBJECTIVE/ OVERNIGHT EVENTS:  feeling better  Pt denied SI/HI ideations, denied visual and auditory hallucinations.   improved mood  no cp, no sob, no n/v/d. no abdominal pain.  no headache, no dizziness.   prefers to go home if possible      --------------------------------------------------------------------------------------------  LABS:                        14.2   4.70  )-----------( 187      ( 22 Nov 2023 06:05 )             42.7     11-22    133<L>  |  97<L>  |  13  ----------------------------<  85  4.5   |  21<L>  |  0.80    Ca    9.6      22 Nov 2023 06:05  Phos  3.5     11-22  Mg     2.00     11-22        CAPILLARY BLOOD GLUCOSE      POCT Blood Glucose.: 95 mg/dL (22 Nov 2023 16:28)  POCT Blood Glucose.: 97 mg/dL (22 Nov 2023 11:19)  POCT Blood Glucose.: 116 mg/dL (22 Nov 2023 07:41)  POCT Blood Glucose.: 92 mg/dL (21 Nov 2023 22:23)        Urinalysis Basic - ( 22 Nov 2023 06:05 )    Color: x / Appearance: x / SG: x / pH: x  Gluc: 85 mg/dL / Ketone: x  / Bili: x / Urobili: x   Blood: x / Protein: x / Nitrite: x   Leuk Esterase: x / RBC: x / WBC x   Sq Epi: x / Non Sq Epi: x / Bacteria: x        RADIOLOGY & ADDITIONAL TESTS:    Imaging Personally Reviewed:  [x] YES  [ ] NO    Consultant(s) Notes Reviewed:  [x] YES  [ ] NO    MEDICATIONS  (STANDING):  amLODIPine   Tablet 10 milliGRAM(s) Oral daily  ARIPiprazole 5 milliGRAM(s) Oral daily  atorvastatin 40 milliGRAM(s) Oral at bedtime  budesonide  80 MICROgram(s)/formoterol 4.5 MICROgram(s) Inhaler 2 Puff(s) Inhalation two times a day  dextrose 5%. 1000 milliLiter(s) (50 mL/Hr) IV Continuous <Continuous>  dextrose 5%. 1000 milliLiter(s) (100 mL/Hr) IV Continuous <Continuous>  dextrose 50% Injectable 25 Gram(s) IV Push once  dextrose 50% Injectable 12.5 Gram(s) IV Push once  dextrose 50% Injectable 25 Gram(s) IV Push once  glucagon  Injectable 1 milliGRAM(s) IntraMuscular once  insulin lispro (ADMELOG) corrective regimen sliding scale   SubCutaneous three times a day before meals  insulin lispro (ADMELOG) corrective regimen sliding scale   SubCutaneous at bedtime  labetalol 200 milliGRAM(s) Oral two times a day  levothyroxine 50 MICROGram(s) Oral daily  losartan 100 milliGRAM(s) Oral daily  tiotropium 2.5 MICROgram(s) Inhaler 2 Puff(s) Inhalation daily    MEDICATIONS  (PRN):  acetaminophen     Tablet .. 650 milliGRAM(s) Oral every 6 hours PRN Temp greater or equal to 38C (100.4F), Mild Pain (1 - 3)  dextrose Oral Gel 15 Gram(s) Oral once PRN Blood Glucose LESS THAN 70 milliGRAM(s)/deciliter  LORazepam   Injectable 2 milliGRAM(s) IntraMuscular every 6 hours PRN Agitation  melatonin 3 milliGRAM(s) Oral at bedtime PRN Insomnia  OLANZapine Injectable 5 milliGRAM(s) IntraMuscular every 4 hours PRN Severe agitation      Care Discussed with Consultants/Other Providers [x] YES  [ ] NO    Vital Signs Last 24 Hrs  T(C): 36.7 (22 Nov 2023 11:10), Max: 36.7 (22 Nov 2023 11:10)  T(F): 98 (22 Nov 2023 11:10), Max: 98 (22 Nov 2023 11:10)  HR: 64 (22 Nov 2023 11:10) (59 - 64)  BP: 133/76 (22 Nov 2023 11:10) (133/76 - 157/68)  BP(mean): --  RR: 18 (22 Nov 2023 11:10) (17 - 18)  SpO2: 100% (22 Nov 2023 11:10) (98% - 100%)    Parameters below as of 22 Nov 2023 11:10  Patient On (Oxygen Delivery Method): room air      I&O's Summary      PHYSICAL EXAM:  GENERAL: NAD, well-developed, comfortable  HEAD:  Atraumatic, Normocephalic  EYES: EOMI, PERRLA, conjunctiva and sclera clear  NECK: Supple, No JVD  CHEST/LUNG: Clear to auscultation bilaterally; No wheeze  HEART: Regular rate and rhythm; No murmurs, rubs, or gallops  ABDOMEN: Soft, Nontender, Nondistended; Bowel sounds present  Neuro: AAOx3, no focal weakness, 5/5 b/l extremity strength  EXTREMITIES:  2+ Peripheral Pulses, No clubbing, cyanosis, or edema  SKIN: No rashes or lesions

## 2023-11-23 ENCOUNTER — TRANSCRIPTION ENCOUNTER (OUTPATIENT)
Age: 58
End: 2023-11-23

## 2023-11-23 LAB
ANION GAP SERPL CALC-SCNC: 13 MMOL/L — SIGNIFICANT CHANGE UP (ref 7–14)
ANION GAP SERPL CALC-SCNC: 13 MMOL/L — SIGNIFICANT CHANGE UP (ref 7–14)
BUN SERPL-MCNC: 12 MG/DL — SIGNIFICANT CHANGE UP (ref 7–23)
BUN SERPL-MCNC: 12 MG/DL — SIGNIFICANT CHANGE UP (ref 7–23)
CALCIUM SERPL-MCNC: 9.7 MG/DL — SIGNIFICANT CHANGE UP (ref 8.4–10.5)
CALCIUM SERPL-MCNC: 9.7 MG/DL — SIGNIFICANT CHANGE UP (ref 8.4–10.5)
CHLORIDE SERPL-SCNC: 97 MMOL/L — LOW (ref 98–107)
CHLORIDE SERPL-SCNC: 97 MMOL/L — LOW (ref 98–107)
CO2 SERPL-SCNC: 23 MMOL/L — SIGNIFICANT CHANGE UP (ref 22–31)
CO2 SERPL-SCNC: 23 MMOL/L — SIGNIFICANT CHANGE UP (ref 22–31)
CREAT SERPL-MCNC: 0.82 MG/DL — SIGNIFICANT CHANGE UP (ref 0.5–1.3)
CREAT SERPL-MCNC: 0.82 MG/DL — SIGNIFICANT CHANGE UP (ref 0.5–1.3)
EGFR: 103 ML/MIN/1.73M2 — SIGNIFICANT CHANGE UP
EGFR: 103 ML/MIN/1.73M2 — SIGNIFICANT CHANGE UP
GLUCOSE BLDC GLUCOMTR-MCNC: 110 MG/DL — HIGH (ref 70–99)
GLUCOSE BLDC GLUCOMTR-MCNC: 76 MG/DL — SIGNIFICANT CHANGE UP (ref 70–99)
GLUCOSE BLDC GLUCOMTR-MCNC: 76 MG/DL — SIGNIFICANT CHANGE UP (ref 70–99)
GLUCOSE BLDC GLUCOMTR-MCNC: 97 MG/DL — SIGNIFICANT CHANGE UP (ref 70–99)
GLUCOSE BLDC GLUCOMTR-MCNC: 97 MG/DL — SIGNIFICANT CHANGE UP (ref 70–99)
GLUCOSE SERPL-MCNC: 78 MG/DL — SIGNIFICANT CHANGE UP (ref 70–99)
GLUCOSE SERPL-MCNC: 78 MG/DL — SIGNIFICANT CHANGE UP (ref 70–99)
HCT VFR BLD CALC: 41.6 % — SIGNIFICANT CHANGE UP (ref 39–50)
HCT VFR BLD CALC: 41.6 % — SIGNIFICANT CHANGE UP (ref 39–50)
HGB BLD-MCNC: 14.1 G/DL — SIGNIFICANT CHANGE UP (ref 13–17)
HGB BLD-MCNC: 14.1 G/DL — SIGNIFICANT CHANGE UP (ref 13–17)
MAGNESIUM SERPL-MCNC: 2 MG/DL — SIGNIFICANT CHANGE UP (ref 1.6–2.6)
MAGNESIUM SERPL-MCNC: 2 MG/DL — SIGNIFICANT CHANGE UP (ref 1.6–2.6)
MCHC RBC-ENTMCNC: 27.6 PG — SIGNIFICANT CHANGE UP (ref 27–34)
MCHC RBC-ENTMCNC: 27.6 PG — SIGNIFICANT CHANGE UP (ref 27–34)
MCHC RBC-ENTMCNC: 33.9 GM/DL — SIGNIFICANT CHANGE UP (ref 32–36)
MCHC RBC-ENTMCNC: 33.9 GM/DL — SIGNIFICANT CHANGE UP (ref 32–36)
MCV RBC AUTO: 81.6 FL — SIGNIFICANT CHANGE UP (ref 80–100)
MCV RBC AUTO: 81.6 FL — SIGNIFICANT CHANGE UP (ref 80–100)
NRBC # BLD: 0 /100 WBCS — SIGNIFICANT CHANGE UP (ref 0–0)
NRBC # BLD: 0 /100 WBCS — SIGNIFICANT CHANGE UP (ref 0–0)
NRBC # FLD: 0 K/UL — SIGNIFICANT CHANGE UP (ref 0–0)
NRBC # FLD: 0 K/UL — SIGNIFICANT CHANGE UP (ref 0–0)
PHOSPHATE SERPL-MCNC: 3.6 MG/DL — SIGNIFICANT CHANGE UP (ref 2.5–4.5)
PHOSPHATE SERPL-MCNC: 3.6 MG/DL — SIGNIFICANT CHANGE UP (ref 2.5–4.5)
PLATELET # BLD AUTO: 193 K/UL — SIGNIFICANT CHANGE UP (ref 150–400)
PLATELET # BLD AUTO: 193 K/UL — SIGNIFICANT CHANGE UP (ref 150–400)
POTASSIUM SERPL-MCNC: 4.6 MMOL/L — SIGNIFICANT CHANGE UP (ref 3.5–5.3)
POTASSIUM SERPL-MCNC: 4.6 MMOL/L — SIGNIFICANT CHANGE UP (ref 3.5–5.3)
POTASSIUM SERPL-SCNC: 4.6 MMOL/L — SIGNIFICANT CHANGE UP (ref 3.5–5.3)
POTASSIUM SERPL-SCNC: 4.6 MMOL/L — SIGNIFICANT CHANGE UP (ref 3.5–5.3)
RBC # BLD: 5.1 M/UL — SIGNIFICANT CHANGE UP (ref 4.2–5.8)
RBC # BLD: 5.1 M/UL — SIGNIFICANT CHANGE UP (ref 4.2–5.8)
RBC # FLD: 14.6 % — HIGH (ref 10.3–14.5)
RBC # FLD: 14.6 % — HIGH (ref 10.3–14.5)
SODIUM SERPL-SCNC: 133 MMOL/L — LOW (ref 135–145)
SODIUM SERPL-SCNC: 133 MMOL/L — LOW (ref 135–145)
WBC # BLD: 5.59 K/UL — SIGNIFICANT CHANGE UP (ref 3.8–10.5)
WBC # BLD: 5.59 K/UL — SIGNIFICANT CHANGE UP (ref 3.8–10.5)
WBC # FLD AUTO: 5.59 K/UL — SIGNIFICANT CHANGE UP (ref 3.8–10.5)
WBC # FLD AUTO: 5.59 K/UL — SIGNIFICANT CHANGE UP (ref 3.8–10.5)

## 2023-11-23 RX ADMIN — ARIPIPRAZOLE 5 MILLIGRAM(S): 15 TABLET ORAL at 11:45

## 2023-11-23 RX ADMIN — BUDESONIDE AND FORMOTEROL FUMARATE DIHYDRATE 2 PUFF(S): 160; 4.5 AEROSOL RESPIRATORY (INHALATION) at 09:31

## 2023-11-23 RX ADMIN — LOSARTAN POTASSIUM 100 MILLIGRAM(S): 100 TABLET, FILM COATED ORAL at 05:00

## 2023-11-23 RX ADMIN — TIOTROPIUM BROMIDE 2 PUFF(S): 18 CAPSULE ORAL; RESPIRATORY (INHALATION) at 14:03

## 2023-11-23 RX ADMIN — AMLODIPINE BESYLATE 10 MILLIGRAM(S): 2.5 TABLET ORAL at 05:01

## 2023-11-23 RX ADMIN — Medication 200 MILLIGRAM(S): at 17:10

## 2023-11-23 RX ADMIN — ATORVASTATIN CALCIUM 40 MILLIGRAM(S): 80 TABLET, FILM COATED ORAL at 21:19

## 2023-11-23 RX ADMIN — Medication 50 MICROGRAM(S): at 05:01

## 2023-11-23 RX ADMIN — BUDESONIDE AND FORMOTEROL FUMARATE DIHYDRATE 2 PUFF(S): 160; 4.5 AEROSOL RESPIRATORY (INHALATION) at 21:20

## 2023-11-23 RX ADMIN — Medication 200 MILLIGRAM(S): at 05:00

## 2023-11-23 NOTE — PROGRESS NOTE ADULT - SUBJECTIVE AND OBJECTIVE BOX
SUBJECTIVE/ OVERNIGHT EVENTS:  No events.  Feel well.  no complaints.   no cp, no sob, no n/v/d.  no abd pain.   Pt denied SI/HI ideations, denied visual and auditory hallucinations.       --------------------------------------------------------------------------------------------  LABS:                        14.1   5.59  )-----------( 193      ( 23 Nov 2023 05:10 )             41.6     11-23    133<L>  |  97<L>  |  12  ----------------------------<  78  4.6   |  23  |  0.82    Ca    9.7      23 Nov 2023 05:10  Phos  3.6     11-23  Mg     2.00     11-23        CAPILLARY BLOOD GLUCOSE      POCT Blood Glucose.: 110 mg/dL (23 Nov 2023 07:43)  POCT Blood Glucose.: 105 mg/dL (22 Nov 2023 21:39)  POCT Blood Glucose.: 95 mg/dL (22 Nov 2023 16:28)        Urinalysis Basic - ( 23 Nov 2023 05:10 )    Color: x / Appearance: x / SG: x / pH: x  Gluc: 78 mg/dL / Ketone: x  / Bili: x / Urobili: x   Blood: x / Protein: x / Nitrite: x   Leuk Esterase: x / RBC: x / WBC x   Sq Epi: x / Non Sq Epi: x / Bacteria: x        RADIOLOGY & ADDITIONAL TESTS:    Imaging Personally Reviewed:  [x] YES  [ ] NO    Consultant(s) Notes Reviewed:  [x] YES  [ ] NO    MEDICATIONS  (STANDING):  amLODIPine   Tablet 10 milliGRAM(s) Oral daily  ARIPiprazole 5 milliGRAM(s) Oral daily  atorvastatin 40 milliGRAM(s) Oral at bedtime  budesonide  80 MICROgram(s)/formoterol 4.5 MICROgram(s) Inhaler 2 Puff(s) Inhalation two times a day  dextrose 5%. 1000 milliLiter(s) (50 mL/Hr) IV Continuous <Continuous>  dextrose 5%. 1000 milliLiter(s) (100 mL/Hr) IV Continuous <Continuous>  dextrose 50% Injectable 25 Gram(s) IV Push once  dextrose 50% Injectable 12.5 Gram(s) IV Push once  dextrose 50% Injectable 25 Gram(s) IV Push once  glucagon  Injectable 1 milliGRAM(s) IntraMuscular once  insulin lispro (ADMELOG) corrective regimen sliding scale   SubCutaneous three times a day before meals  insulin lispro (ADMELOG) corrective regimen sliding scale   SubCutaneous at bedtime  labetalol 200 milliGRAM(s) Oral two times a day  levothyroxine 50 MICROGram(s) Oral daily  losartan 100 milliGRAM(s) Oral daily  tiotropium 2.5 MICROgram(s) Inhaler 2 Puff(s) Inhalation daily    MEDICATIONS  (PRN):  acetaminophen     Tablet .. 650 milliGRAM(s) Oral every 6 hours PRN Temp greater or equal to 38C (100.4F), Mild Pain (1 - 3)  dextrose Oral Gel 15 Gram(s) Oral once PRN Blood Glucose LESS THAN 70 milliGRAM(s)/deciliter  LORazepam   Injectable 2 milliGRAM(s) IntraMuscular every 6 hours PRN Agitation  melatonin 3 milliGRAM(s) Oral at bedtime PRN Insomnia  OLANZapine Injectable 5 milliGRAM(s) IntraMuscular every 4 hours PRN Severe agitation      Care Discussed with Consultants/Other Providers [x] YES  [ ] NO    Vital Signs Last 24 Hrs  T(C): 36.8 (23 Nov 2023 04:58), Max: 36.8 (23 Nov 2023 04:58)  T(F): 98.2 (23 Nov 2023 04:58), Max: 98.2 (23 Nov 2023 04:58)  HR: 62 (23 Nov 2023 04:58) (57 - 62)  BP: 152/71 (23 Nov 2023 04:58) (152/71 - 166/81)  BP(mean): --  RR: 17 (23 Nov 2023 04:58) (17 - 17)  SpO2: 97% (23 Nov 2023 04:58) (97% - 98%)    Parameters below as of 23 Nov 2023 04:58  Patient On (Oxygen Delivery Method): room air      I&O's Summary        PHYSICAL EXAM:  GENERAL: NAD, well-developed, comfortable  HEAD:  Atraumatic, Normocephalic  EYES: EOMI, PERRLA, conjunctiva and sclera clear  NECK: Supple, No JVD  CHEST/LUNG: Clear to auscultation bilaterally; No wheeze  HEART: Regular rate and rhythm; No murmurs, rubs, or gallops  ABDOMEN: Soft, Nontender, Nondistended; Bowel sounds present  Neuro: AAOx3, no focal weakness, 5/5 b/l extremity strength  EXTREMITIES:  2+ Peripheral Pulses, No clubbing, cyanosis, or edema  SKIN: No rashes or lesions

## 2023-11-23 NOTE — PROGRESS NOTE ADULT - PROBLEM SELECTOR PLAN 1
Likely 2/2 psychogenic polydipsia per history, improving with appropriate fluid restriction here   - Daily BMP, stable Na  - Encouraged patient to only drink water when he is thirsty  - Cont fluid restriction, encouraged.

## 2023-11-23 NOTE — DISCHARGE NOTE PROVIDER - NSDCFUSCHEDAPPT_GEN_ALL_CORE_FT
[facility] [address]  Scheduled Appointment: 12/07/2023    Boxer, Mitchell B  [facility]  [address]  Scheduled Appointment: 01/17/2024

## 2023-11-23 NOTE — DISCHARGE NOTE PROVIDER - NSDCMRMEDTOKEN_GEN_ALL_CORE_FT
Abilify Maintena Prefilled Syringe 300 mg intramuscular injection, extended release: 300 milligram(s) intramuscularly once a month  irbesartan 300 mg oral tablet: 1 tab(s) orally once a day  labetalol 200 mg oral tablet: 1 tab(s) orally 2 times a day  levothyroxine 50 mcg (0.05 mg) oral tablet: 1 tab(s) orally once a day  metFORMIN 500 mg oral tablet: 1 tab(s) orally 2 times a day  rosuvastatin 10 mg oral tablet: 1 tab(s) orally once a day  Trelegy Ellipta 100 mcg-62.5 mcg-25 mcg/inh inhalation powder: 1 puff(s) inhaled once a day   Abilify Maintena Prefilled Syringe 300 mg intramuscular injection, extended release: 300 milligram(s) intramuscularly once a month  amLODIPine 10 mg oral tablet: 1 tab(s) orally once a day  irbesartan 300 mg oral tablet: 1 tab(s) orally once a day  labetalol 200 mg oral tablet: 1 tab(s) orally 2 times a day  levothyroxine 50 mcg (0.05 mg) oral tablet: 1 tab(s) orally once a day  metFORMIN 500 mg oral tablet: 1 tab(s) orally 2 times a day  rosuvastatin 10 mg oral tablet: 1 tab(s) orally once a day  Trelegy Ellipta 100 mcg-62.5 mcg-25 mcg/inh inhalation powder: 1 puff(s) inhaled once a day   Abilify Maintena Prefilled Syringe 300 mg intramuscular injection, extended release: 300 milligram(s) intramuscularly once a month  amLODIPine 10 mg oral tablet: 1 tab(s) orally once a day  ARIPiprazole 5 mg oral tablet: 1 tab(s) orally once a day  irbesartan 300 mg oral tablet: 1 tab(s) orally once a day  labetalol 200 mg oral tablet: 1 tab(s) orally 2 times a day  levothyroxine 50 mcg (0.05 mg) oral tablet: 1 tab(s) orally once a day  metFORMIN 500 mg oral tablet: 1 tab(s) orally 2 times a day  rosuvastatin 10 mg oral tablet: 1 tab(s) orally once a day  Trelegy Ellipta 100 mcg-62.5 mcg-25 mcg/inh inhalation powder: 1 puff(s) inhaled once a day
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
## 2023-11-23 NOTE — DISCHARGE NOTE PROVIDER - NSDCCPCAREPLAN_GEN_ALL_CORE_FT
PRINCIPAL DISCHARGE DIAGNOSIS  Diagnosis: Paranoia  Assessment and Plan of Treatment:       SECONDARY DISCHARGE DIAGNOSES  Diagnosis: Schizoaffective disorder  Assessment and Plan of Treatment:     Diagnosis: HTN (hypertension)  Assessment and Plan of Treatment:     Diagnosis: T2DM (type 2 diabetes mellitus)  Assessment and Plan of Treatment:     Diagnosis: HLD (hyperlipidemia)  Assessment and Plan of Treatment:     Diagnosis: CVID (common variable immunodeficiency)  Assessment and Plan of Treatment:     Diagnosis: Hyponatremia  Assessment and Plan of Treatment:      PRINCIPAL DISCHARGE DIAGNOSIS  Diagnosis: Paranoia  Assessment and Plan of Treatment: Improved. Continue your medications as directed and follow up with your PCP and psychiatrist for further evaluation and medical management.        SECONDARY DISCHARGE DIAGNOSES  Diagnosis: Schizoaffective disorder  Assessment and Plan of Treatment: Continue your medications as directed and follow up with your PCP and psychiatrist for further evaluation and medical management.    Diagnosis: HTN (hypertension)  Assessment and Plan of Treatment: Continue blood pressure medication regimen as directed. Monitor for any visual changes, headaches or dizziness.  Monitor blood pressure regularly.  Follow up with your PCP for further management for high blood pressure.      Diagnosis: T2DM (type 2 diabetes mellitus)  Assessment and Plan of Treatment: Continue consistent carbohydrate diet.  Monitor blood glucose levels throughout the day before meals and at bedtime.  Record blood sugars and bring to outpatient providers appointment in order to be reviewed by your doctor for management modifications.  Be aware of diabetes management symptoms including feeling cool and clammy may be related to low glucose levels.  Feeling hot and dry may indicate high glucose levels. If you feel these symptoms, check your blood sugar.  Make regular podiatry appointments in order to have feet checked for wounds and toe nails cut by a doctor to prevent infections, as well as, appointments with an ophthalmologist to monitor your vision.      Diagnosis: HLD (hyperlipidemia)  Assessment and Plan of Treatment: Continue cholesterol control medications. Continue DASH diet. Follow up with your PCP within 1 week of discharge for further management and monitoring of lipid and cholesterol panels.      Diagnosis: CVID (common variable immunodeficiency)  Assessment and Plan of Treatment: Follow up outpatient with your PCP for further management.    Diagnosis: Hyponatremia  Assessment and Plan of Treatment: Improved. Follow up outpatient PCP in 1-2 weeks for further management.     PRINCIPAL DISCHARGE DIAGNOSIS  Diagnosis: Paranoia  Assessment and Plan of Treatment: Improved. Continue your medications as directed and follow up with your PCP and psychiatrist for further evaluation and medical management.        SECONDARY DISCHARGE DIAGNOSES  Diagnosis: Schizoaffective disorder  Assessment and Plan of Treatment: Continue your medications as directed and follow up with your PCP and psychiatrist for further evaluation and medical management. You are also scheduled for your Abilify injection on 12/14 at 11:15 AM.    Diagnosis: HTN (hypertension)  Assessment and Plan of Treatment: Continue blood pressure medication regimen as directed. Monitor for any visual changes, headaches or dizziness.  Monitor blood pressure regularly.  Follow up with your PCP for further management for high blood pressure.      Diagnosis: T2DM (type 2 diabetes mellitus)  Assessment and Plan of Treatment: Continue consistent carbohydrate diet.  Monitor blood glucose levels throughout the day before meals and at bedtime.  Record blood sugars and bring to outpatient providers appointment in order to be reviewed by your doctor for management modifications.  Be aware of diabetes management symptoms including feeling cool and clammy may be related to low glucose levels.  Feeling hot and dry may indicate high glucose levels. If you feel these symptoms, check your blood sugar.  Make regular podiatry appointments in order to have feet checked for wounds and toe nails cut by a doctor to prevent infections, as well as, appointments with an ophthalmologist to monitor your vision.      Diagnosis: HLD (hyperlipidemia)  Assessment and Plan of Treatment: Continue cholesterol control medications. Continue DASH diet. Follow up with your PCP within 1 week of discharge for further management and monitoring of lipid and cholesterol panels.      Diagnosis: CVID (common variable immunodeficiency)  Assessment and Plan of Treatment: Follow up outpatient with your PCP for further management.    Diagnosis: Hyponatremia  Assessment and Plan of Treatment: Improved. Follow up outpatient PCP in 1-2 weeks for further management.

## 2023-11-23 NOTE — DISCHARGE NOTE PROVIDER - CARE PROVIDER_API CALL
Ketan Mcclendon  Internal Medicine  [address] [place] [address], [facility] [address]  [place] [address]  Phone: [phone]  Fax: [phone]  Follow Up Time:

## 2023-11-24 ENCOUNTER — TRANSCRIPTION ENCOUNTER (OUTPATIENT)
Age: 58
End: 2023-11-24

## 2023-11-24 VITALS
OXYGEN SATURATION: 100 % | TEMPERATURE: 98 F | SYSTOLIC BLOOD PRESSURE: 136 MMHG | RESPIRATION RATE: 18 BRPM | HEART RATE: 59 BPM | DIASTOLIC BLOOD PRESSURE: 74 MMHG

## 2023-11-24 LAB
GLUCOSE BLDC GLUCOMTR-MCNC: 117 MG/DL — HIGH (ref 70–99)
GLUCOSE BLDC GLUCOMTR-MCNC: 117 MG/DL — HIGH (ref 70–99)
GLUCOSE BLDC GLUCOMTR-MCNC: 121 MG/DL — HIGH (ref 70–99)
GLUCOSE BLDC GLUCOMTR-MCNC: 121 MG/DL — HIGH (ref 70–99)

## 2023-11-24 PROCEDURE — 99232 SBSQ HOSP IP/OBS MODERATE 35: CPT

## 2023-11-24 RX ORDER — ARIPIPRAZOLE 15 MG/1
1 TABLET ORAL
Qty: 30 | Refills: 0
Start: 2023-11-24 | End: 2023-12-23

## 2023-11-24 RX ORDER — AMLODIPINE BESYLATE 2.5 MG/1
1 TABLET ORAL
Qty: 0 | Refills: 0 | DISCHARGE
Start: 2023-11-24

## 2023-11-24 RX ORDER — AMLODIPINE BESYLATE 2.5 MG/1
1 TABLET ORAL
Qty: 30 | Refills: 0
Start: 2023-11-24 | End: 2023-12-23

## 2023-11-24 RX ADMIN — BUDESONIDE AND FORMOTEROL FUMARATE DIHYDRATE 2 PUFF(S): 160; 4.5 AEROSOL RESPIRATORY (INHALATION) at 12:02

## 2023-11-24 RX ADMIN — LOSARTAN POTASSIUM 100 MILLIGRAM(S): 100 TABLET, FILM COATED ORAL at 06:00

## 2023-11-24 RX ADMIN — Medication 200 MILLIGRAM(S): at 17:35

## 2023-11-24 RX ADMIN — Medication 50 MICROGRAM(S): at 06:00

## 2023-11-24 RX ADMIN — ARIPIPRAZOLE 5 MILLIGRAM(S): 15 TABLET ORAL at 12:01

## 2023-11-24 RX ADMIN — Medication 200 MILLIGRAM(S): at 06:00

## 2023-11-24 RX ADMIN — AMLODIPINE BESYLATE 10 MILLIGRAM(S): 2.5 TABLET ORAL at 06:00

## 2023-11-24 RX ADMIN — TIOTROPIUM BROMIDE 2 PUFF(S): 18 CAPSULE ORAL; RESPIRATORY (INHALATION) at 12:02

## 2023-11-24 NOTE — DIETITIAN INITIAL EVALUATION ADULT - PERTINENT LABORATORY DATA
11-23    133<L>  |  97<L>  |  12  ----------------------------<  78  4.6   |  23  |  0.82    Ca    9.7      23 Nov 2023 05:10  Phos  3.6     11-23  Mg     2.00     11-23    POCT Blood Glucose.: 121 mg/dL (11-24-23 @ 12:04)  A1C with Estimated Average Glucose Result: 5.2 % (11-18-23 @ 05:50)  A1C with Estimated Average Glucose Result: 5.2 % (07-14-23 @ 23:42)  A1C with Estimated Average Glucose Result: 6.3 % (05-08-23 @ 05:55)

## 2023-11-24 NOTE — BH CONSULTATION LIAISON PROGRESS NOTE - NSBHATTESTTYPEVISIT_PSY_A_CORE
Attending Only
Attending Only
Attending with Resident/Fellow/Student
Resident/Fellow with telephonic supervision
Attending with Resident/Fellow/Student

## 2023-11-24 NOTE — BH CONSULTATION LIAISON PROGRESS NOTE - NSBHCHARTREVIEWLAB_PSY_A_CORE FT
CBC Full  -  ( 23 Nov 2023 05:10 )  WBC Count : 5.59 K/uL  RBC Count : 5.10 M/uL  Hemoglobin : 14.1 g/dL  Hematocrit : 41.6 %  Platelet Count - Automated : 193 K/uL  Mean Cell Volume : 81.6 fL  Mean Cell Hemoglobin : 27.6 pg  Mean Cell Hemoglobin Concentration : 33.9 gm/dL  Auto Neutrophil # : x  Auto Lymphocyte # : x  Auto Monocyte # : x  Auto Eosinophil # : x  Auto Basophil # : x  Auto Neutrophil % : x  Auto Lymphocyte % : x  Auto Monocyte % : x  Auto Eosinophil % : x  Auto Basophil % : x  11-23    133<L>  |  97<L>  |  12  ----------------------------<  78  4.6   |  23  |  0.82    Ca    9.7      23 Nov 2023 05:10  Phos  3.6     11-23  Mg     2.00     11-23    
11-18    132<L>  |  96<L>  |  12  ----------------------------<  90  4.0   |  27  |  0.77    Ca    9.4      18 Nov 2023 05:50  Phos  3.5     11-18  Mg     2.10     11-18    TPro  6.9  /  Alb  4.1  /  TBili  0.3  /  DBili  x   /  AST  48<H>  /  ALT  34  /  AlkPhos  129<H>  11-17  
	                      14.2   4.70  )-----------( 187      ( 22 Nov 2023 06:05 )             42.7   
                      13.2   5.02  )-----------( 173      ( 17 Nov 2023 07:42 )             38.6     11-17    130<L>  |  93<L>  |  12  ----------------------------<  104<H>  3.8   |  27  |  0.78    Ca    9.7      17 Nov 2023 07:42  Phos  4.2     11-17  Mg     2.20     11-17    TPro  6.9  /  Alb  4.1  /  TBili  0.3  /  DBili  x   /  AST  48<H>  /  ALT  34  /  AlkPhos  129<H>  11-17  
                      14.1   5.66  )-----------( 208      ( 20 Nov 2023 05:36 )             41.6     11-20    130<L>  |  93<L>  |  10  ----------------------------<  89  4.0   |  26  |  0.76    Ca    9.7      20 Nov 2023 05:36  Phos  3.9     11-20  Mg     2.00     11-20    TPro  6.9  /  Alb  4.4  /  TBili  0.4  /  DBili  x   /  AST  34  /  ALT  25  /  AlkPhos  117  11-20

## 2023-11-24 NOTE — BH CONSULTATION LIAISON PROGRESS NOTE - CURRENT MEDICATION
MEDICATIONS  (STANDING):  amLODIPine   Tablet 10 milliGRAM(s) Oral daily  ARIPiprazole 5 milliGRAM(s) Oral daily  atorvastatin 40 milliGRAM(s) Oral at bedtime  budesonide  80 MICROgram(s)/formoterol 4.5 MICROgram(s) Inhaler 2 Puff(s) Inhalation two times a day  dextrose 5%. 1000 milliLiter(s) (50 mL/Hr) IV Continuous <Continuous>  dextrose 5%. 1000 milliLiter(s) (100 mL/Hr) IV Continuous <Continuous>  dextrose 50% Injectable 25 Gram(s) IV Push once  dextrose 50% Injectable 25 Gram(s) IV Push once  dextrose 50% Injectable 12.5 Gram(s) IV Push once  glucagon  Injectable 1 milliGRAM(s) IntraMuscular once  insulin lispro (ADMELOG) corrective regimen sliding scale   SubCutaneous three times a day before meals  insulin lispro (ADMELOG) corrective regimen sliding scale   SubCutaneous at bedtime  labetalol 200 milliGRAM(s) Oral two times a day  levothyroxine 50 MICROGram(s) Oral daily  losartan 100 milliGRAM(s) Oral daily  tiotropium 2.5 MICROgram(s) Inhaler 2 Puff(s) Inhalation daily    MEDICATIONS  (PRN):  acetaminophen     Tablet .. 650 milliGRAM(s) Oral every 6 hours PRN Temp greater or equal to 38C (100.4F), Mild Pain (1 - 3)  dextrose Oral Gel 15 Gram(s) Oral once PRN Blood Glucose LESS THAN 70 milliGRAM(s)/deciliter  LORazepam   Injectable 2 milliGRAM(s) IntraMuscular every 6 hours PRN Agitation  melatonin 3 milliGRAM(s) Oral at bedtime PRN Insomnia  OLANZapine Injectable 5 milliGRAM(s) IntraMuscular every 4 hours PRN Severe agitation  
MEDICATIONS  (STANDING):  amLODIPine   Tablet 10 milliGRAM(s) Oral daily  ARIPiprazole 5 milliGRAM(s) Oral daily  atorvastatin 40 milliGRAM(s) Oral at bedtime  budesonide  80 MICROgram(s)/formoterol 4.5 MICROgram(s) Inhaler 2 Puff(s) Inhalation two times a day  dextrose 5%. 1000 milliLiter(s) (50 mL/Hr) IV Continuous <Continuous>  dextrose 5%. 1000 milliLiter(s) (100 mL/Hr) IV Continuous <Continuous>  dextrose 50% Injectable 25 Gram(s) IV Push once  dextrose 50% Injectable 12.5 Gram(s) IV Push once  dextrose 50% Injectable 25 Gram(s) IV Push once  glucagon  Injectable 1 milliGRAM(s) IntraMuscular once  insulin lispro (ADMELOG) corrective regimen sliding scale   SubCutaneous three times a day before meals  insulin lispro (ADMELOG) corrective regimen sliding scale   SubCutaneous at bedtime  labetalol 200 milliGRAM(s) Oral two times a day  levothyroxine 50 MICROGram(s) Oral daily  losartan 100 milliGRAM(s) Oral daily  tiotropium 2.5 MICROgram(s) Inhaler 2 Puff(s) Inhalation daily    MEDICATIONS  (PRN):  acetaminophen     Tablet .. 650 milliGRAM(s) Oral every 6 hours PRN Temp greater or equal to 38C (100.4F), Mild Pain (1 - 3)  dextrose Oral Gel 15 Gram(s) Oral once PRN Blood Glucose LESS THAN 70 milliGRAM(s)/deciliter  LORazepam   Injectable 2 milliGRAM(s) IntraMuscular every 6 hours PRN Agitation  melatonin 3 milliGRAM(s) Oral at bedtime PRN Insomnia  OLANZapine Injectable 5 milliGRAM(s) IntraMuscular every 4 hours PRN Severe agitation  
MEDICATIONS  (STANDING):  ARIPiprazole 5 milliGRAM(s) Oral daily  atorvastatin 40 milliGRAM(s) Oral at bedtime  budesonide  80 MICROgram(s)/formoterol 4.5 MICROgram(s) Inhaler 2 Puff(s) Inhalation two times a day  dextrose 5%. 1000 milliLiter(s) (100 mL/Hr) IV Continuous <Continuous>  dextrose 5%. 1000 milliLiter(s) (50 mL/Hr) IV Continuous <Continuous>  dextrose 50% Injectable 25 Gram(s) IV Push once  dextrose 50% Injectable 12.5 Gram(s) IV Push once  dextrose 50% Injectable 25 Gram(s) IV Push once  glucagon  Injectable 1 milliGRAM(s) IntraMuscular once  insulin lispro (ADMELOG) corrective regimen sliding scale   SubCutaneous three times a day before meals  insulin lispro (ADMELOG) corrective regimen sliding scale   SubCutaneous at bedtime  labetalol 200 milliGRAM(s) Oral two times a day  levothyroxine 50 MICROGram(s) Oral daily  losartan 100 milliGRAM(s) Oral daily  tiotropium 2.5 MICROgram(s) Inhaler 2 Puff(s) Inhalation daily    MEDICATIONS  (PRN):  acetaminophen     Tablet .. 650 milliGRAM(s) Oral every 6 hours PRN Temp greater or equal to 38C (100.4F), Mild Pain (1 - 3)  dextrose Oral Gel 15 Gram(s) Oral once PRN Blood Glucose LESS THAN 70 milliGRAM(s)/deciliter  LORazepam   Injectable 2 milliGRAM(s) IntraMuscular every 6 hours PRN Agitation  melatonin 3 milliGRAM(s) Oral at bedtime PRN Insomnia  OLANZapine Injectable 5 milliGRAM(s) IntraMuscular every 4 hours PRN Severe agitation  
MEDICATIONS  (STANDING):  amLODIPine   Tablet 10 milliGRAM(s) Oral daily  ARIPiprazole 5 milliGRAM(s) Oral daily  atorvastatin 40 milliGRAM(s) Oral at bedtime  budesonide  80 MICROgram(s)/formoterol 4.5 MICROgram(s) Inhaler 2 Puff(s) Inhalation two times a day  dextrose 5%. 1000 milliLiter(s) (50 mL/Hr) IV Continuous <Continuous>  dextrose 5%. 1000 milliLiter(s) (100 mL/Hr) IV Continuous <Continuous>  dextrose 50% Injectable 25 Gram(s) IV Push once  dextrose 50% Injectable 12.5 Gram(s) IV Push once  dextrose 50% Injectable 25 Gram(s) IV Push once  glucagon  Injectable 1 milliGRAM(s) IntraMuscular once  insulin lispro (ADMELOG) corrective regimen sliding scale   SubCutaneous three times a day before meals  insulin lispro (ADMELOG) corrective regimen sliding scale   SubCutaneous at bedtime  labetalol 200 milliGRAM(s) Oral two times a day  levothyroxine 50 MICROGram(s) Oral daily  losartan 100 milliGRAM(s) Oral daily  tiotropium 2.5 MICROgram(s) Inhaler 2 Puff(s) Inhalation daily    MEDICATIONS  (PRN):  acetaminophen     Tablet .. 650 milliGRAM(s) Oral every 6 hours PRN Temp greater or equal to 38C (100.4F), Mild Pain (1 - 3)  dextrose Oral Gel 15 Gram(s) Oral once PRN Blood Glucose LESS THAN 70 milliGRAM(s)/deciliter  LORazepam   Injectable 2 milliGRAM(s) IntraMuscular every 6 hours PRN Agitation  melatonin 3 milliGRAM(s) Oral at bedtime PRN Insomnia  OLANZapine Injectable 5 milliGRAM(s) IntraMuscular every 4 hours PRN Severe agitation  
MEDICATIONS  (STANDING):  atorvastatin 40 milliGRAM(s) Oral at bedtime  budesonide  80 MICROgram(s)/formoterol 4.5 MICROgram(s) Inhaler 2 Puff(s) Inhalation two times a day  dextrose 5%. 1000 milliLiter(s) (100 mL/Hr) IV Continuous <Continuous>  dextrose 5%. 1000 milliLiter(s) (50 mL/Hr) IV Continuous <Continuous>  dextrose 50% Injectable 25 Gram(s) IV Push once  dextrose 50% Injectable 12.5 Gram(s) IV Push once  dextrose 50% Injectable 25 Gram(s) IV Push once  glucagon  Injectable 1 milliGRAM(s) IntraMuscular once  insulin lispro (ADMELOG) corrective regimen sliding scale   SubCutaneous three times a day before meals  insulin lispro (ADMELOG) corrective regimen sliding scale   SubCutaneous at bedtime  labetalol 200 milliGRAM(s) Oral two times a day  levothyroxine 50 MICROGram(s) Oral daily  losartan 100 milliGRAM(s) Oral daily  tiotropium 2.5 MICROgram(s) Inhaler 2 Puff(s) Inhalation daily    MEDICATIONS  (PRN):  acetaminophen     Tablet .. 650 milliGRAM(s) Oral every 6 hours PRN Temp greater or equal to 38C (100.4F), Mild Pain (1 - 3)  dextrose Oral Gel 15 Gram(s) Oral once PRN Blood Glucose LESS THAN 70 milliGRAM(s)/deciliter  haloperidol    Injectable 5 milliGRAM(s) IntraMuscular every 6 hours PRN Agitation  LORazepam   Injectable 2 milliGRAM(s) IntraMuscular every 6 hours PRN Agitation  melatonin 3 milliGRAM(s) Oral at bedtime PRN Insomnia

## 2023-11-24 NOTE — BH CONSULTATION LIAISON PROGRESS NOTE - NSBHFUPREASONCONS_PSY_A_CORE
Shift assessment completed. Routine vitals stable. Scheduled medications held at current time d/t NPO order. Patient is awake, alert and oriented. Respirations are easy and unlabored. Patient does not appear to be in distress, denies pain. No current needs expressed. Transport at bedside to take pt to CT.     1045: Pt back to unit, vitals obtained. Bone marrow bx site c/d/I. No current needs expressed. Wife at bedside. Call light in reach. psychosis/med management

## 2023-11-24 NOTE — BH CONSULTATION LIAISON PROGRESS NOTE - GENERAL APPEARANCE
No deformities present
Developmentally delayed

## 2023-11-24 NOTE — PROVIDER CONTACT NOTE (OTHER) - ACTION/TREATMENT ORDERED:
ACP made aware. ACP made aware. Reached out to covering SW -- Covering SW will look into it ([id number]); Kirsty covering ED ([id number])

## 2023-11-24 NOTE — BH CONSULTATION LIAISON PROGRESS NOTE - NSBHFUPINTERVALHXFT_PSY_A_CORE
met with the patient. Asks MD about a discharge home. States that he feels much better, and denies any ah for the last 3 days. Adamantly denies any mood symptoms, denies any si or hi, or psychosis.   States that he knows that he must call 911 or come to ed if any issues with safety or if he does not feel well psychiatrically.   Care coordinated with [facility] nichole-- has an injection appt on 12/14 for BELLA. Awaiting information from Dr Coko about clinic appt. Patient patiently waited till Dr Cook responded, and then said that he will call clinic and get an appt. Gave him the aopd number. Emailed the clinic and Dr Cook that patient will be calling them.

## 2023-11-24 NOTE — BH CONSULTATION LIAISON PROGRESS NOTE - NSBHCHARTREVIEWVS_PSY_A_CORE FT
Vital Signs Last 24 Hrs  T(C): 36.8 (17 Nov 2023 12:51), Max: 37 (17 Nov 2023 02:30)  T(F): 98.3 (17 Nov 2023 12:51), Max: 98.6 (17 Nov 2023 02:30)  HR: 61 (17 Nov 2023 12:51) (58 - 86)  BP: 141/78 (17 Nov 2023 12:51) (141/78 - 172/91)  BP(mean): --  RR: 18 (17 Nov 2023 12:51) (16 - 979)  SpO2: 100% (17 Nov 2023 12:51) (99% - 100%)    Parameters below as of 17 Nov 2023 12:51  Patient On (Oxygen Delivery Method): room air    
Vital Signs Last 24 Hrs  T(C): 36.7 (22 Nov 2023 11:10), Max: 36.7 (22 Nov 2023 11:10)  T(F): 98 (22 Nov 2023 11:10), Max: 98 (22 Nov 2023 11:10)  HR: 64 (22 Nov 2023 11:10) (59 - 64)  BP: 133/76 (22 Nov 2023 11:10) (133/76 - 157/68)  BP(mean): --  RR: 18 (22 Nov 2023 11:10) (17 - 18)  SpO2: 100% (22 Nov 2023 11:10) (98% - 100%)    Parameters below as of 22 Nov 2023 11:10  Patient On (Oxygen Delivery Method): room air    
Vital Signs Last 24 Hrs  T(C): 36.8 (18 Nov 2023 11:06), Max: 36.9 (18 Nov 2023 05:30)  T(F): 98.2 (18 Nov 2023 11:06), Max: 98.5 (18 Nov 2023 05:30)  HR: 57 (18 Nov 2023 11:06) (57 - 76)  BP: 144/66 (18 Nov 2023 11:06) (125/82 - 156/73)  BP(mean): --  RR: 18 (18 Nov 2023 11:06) (18 - 18)  SpO2: 95% (18 Nov 2023 11:06) (95% - 100%)    Parameters below as of 18 Nov 2023 11:06  Patient On (Oxygen Delivery Method): room air    
Vital Signs Last 24 Hrs  T(C): 36.4 (20 Nov 2023 12:03), Max: 37.1 (19 Nov 2023 19:30)  T(F): 97.5 (20 Nov 2023 12:03), Max: 98.7 (19 Nov 2023 19:30)  HR: 62 (20 Nov 2023 12:03) (55 - 80)  BP: 142/84 (20 Nov 2023 12:03) (142/84 - 201/109)  BP(mean): 104 (20 Nov 2023 12:03) (103 - 148)  RR: 18 (20 Nov 2023 12:03) (18 - 18)  SpO2: 96% (20 Nov 2023 12:03) (95% - 100%)    Parameters below as of 20 Nov 2023 12:03  Patient On (Oxygen Delivery Method): room air    
Vital Signs Last 24 Hrs  T(C): 36.9 (24 Nov 2023 11:53), Max: 36.9 (24 Nov 2023 11:53)  T(F): 98.4 (24 Nov 2023 11:53), Max: 98.4 (24 Nov 2023 11:53)  HR: 59 (24 Nov 2023 11:53) (58 - 68)  BP: 136/74 (24 Nov 2023 11:53) (136/74 - 145/78)  BP(mean): --  RR: 18 (24 Nov 2023 11:53) (17 - 18)  SpO2: 100% (24 Nov 2023 11:53) (95% - 100%)    Parameters below as of 24 Nov 2023 11:53  Patient On (Oxygen Delivery Method): room air

## 2023-11-24 NOTE — BH CONSULTATION LIAISON PROGRESS NOTE - NSBHMSEIMPULSE_PSY_A_CORE
Group Therapy Note    Date: 4/3/2020    Group Start Time: 0830  Group End Time: 0900     Number of Participants: 3/5    Type: Morning Goals Group/ Community Meeting    Group Topic/Objective: Set Goal For The Day and to review Unit Rules and Regulations. Patient's Goal:  Pt states her goal is \"Sweet and sweet. \"    Notes:  Pt participated well in group. Pt noted at times to struggle with expressive aphasia. Pt states she feels \"beautiful\" and is grateful for \"[place], [place]\"  Pt reports getting ~10 hours of restful sleep. Depression (0-10): 0    Anxiety (0-10): 0    Irritability/Aggitation (0-10): 0    Status After Intervention:  Improved    Participation Level:  Active Listener and Interactive    Participation Quality: Appropriate, Attentive and Sharing    Speech:  normal    Thought Process/Content: Logical    Affective Functioning: Congruent    Mood: Bright    Level of consciousness:  Alert and Attentive    Response to Learning: Able to verbalize current knowledge/experience    Endings: None Reported    Modes of Intervention: Education, Support and Socialization    Discipline Responsible: Certified Therapeutic Recreation Specialist     Electronically signed by Katey Argueta MA on 4/3/2020 at 9:11 AM Normal

## 2023-11-24 NOTE — PROGRESS NOTE ADULT - PROBLEM SELECTOR PLAN 3
Reports last infusion was 11/7, next due 11/28  - monitor for infectious symptoms

## 2023-11-24 NOTE — DIETITIAN INITIAL EVALUATION ADULT - OTHER INFO
Pt 59 yo male with PMHx of HTN, T2DM, CVID, schizoaffective disorder with multiple psych hospitalizations presented with auditory hallucinations, also found to have asymptomatic hyponatremia - per chart review.     At time of visit, Pt awake, alert. Per Pt, his appetite good; no vomiting or diarrhea @ this time. Pt edentulous but denies having any chewing difficulty. No report of swallowing difficulty either. Of note, Pt's diet rx includes Consistent Carbohydrate with Fluid restrictions. Of note, Pt's HbA1c level 5.2% (11/18). No food related concern voiced at present. Case discussed with nurse. Pt to be discharged today, reported. RD remains available.

## 2023-11-24 NOTE — PROVIDER CONTACT NOTE (OTHER) - SITUATION
180/95 BP out of parameter no signs or symptoms of chest pain or sob
/96
elevated blood pressure manually 170/110
Blood pressure remains elevated 201/ 109 HR 55
Patient to be d/c'd home at [facility]pm [facility] No one has arrived yet. Reached out to [employer] with no response. Reaching out to ED SW with no response.

## 2023-11-24 NOTE — DIETITIAN INITIAL EVALUATION ADULT - FEEDING SKILL
FSBS 71, per Radha Rodriguez, [facility]     Astrid Rosas, NICOLE  04/30/18 1659     independent

## 2023-11-24 NOTE — BH CONSULTATION LIAISON PROGRESS NOTE - NSBHASSESSMENTFT_PSY_ALL_CORE
Patient is a 58 years old male with the past hx of schizoaffective d/o, bipolar d/o, hx of multiple psych hospitalizations (last known in 2020 at [facility]), denies hx of aborted SA many years ago, denies drug or alcohol use, denies legal hx, ,  pt in treatment at [facility] AOPD with DR. Cook , and is on BELLA; and PMH HTN, T2DM (on metformin), CVID with hypogammaglobulinema, hx of MRSA infection in August, pt BIB self for paranoia. Currently paranoid, ideas of reference, AH, HI.    11/18: Pt still endorsing AH, denies any CAH. Paranoid delusion of  wanting to kill him. No SIIP in the hospital but has plan to OD on medication if he were to be discharged home, CANNOT leave AMA.   11/20: Pt is improving, has better insight and judgement, no HI, less preoccupied with psychotic symptoms.  11/22--- denies any ah today, calm.  11/24-- no psychotic symptoms for the last 3-4 days, sleeping well, compliant, no impulsivity, denied any si or hi, no distress. Suspecting hyponatremia could have played a role in his presenting symptoms.   Not keen on a voluntary psychiatric admission. No indication for an involuntary psychiatric admission. Safety, discharge and treatment planning done. Attempted to call his sister Brittany several times-- no answer [phone], [phone].   Chronic risks but at this time is not in imminent risk to hurt self or others-- older male, schizophrenia, compliant with meds, psychosis resolved, no si or hi, not impulsive, not aggressive, engages in safety, d/c and treatment planning. Engaged at the clinic. No access to guns or weapons.     Recommendations:  - Not been on 1:1 since [facility] admission.   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 (Last dose received on 11/16/2023)      For agitation:  Zyprexa 5 mg IM Q 4 Hours PRN  if QTC is <500  If QTC >500, give Lorazepam 1 mg Q 6hours IM/IV.  DO NOT give IM/IV benzos within ONE HOUR of IM zyprexa due to the potential respiratory suppression    DISPOSITION===Home with outpatient psych f/u. Clinic aware of need for f.u appt with Dr Cook. Patient will be calling Dr Cook to schedule an appt.

## 2023-11-24 NOTE — PROGRESS NOTE ADULT - NSPROGADDITIONALINFOA_GEN_ALL_CORE
- remain calm and symptoms improving. maybe okay with home with outpt psyc clinic follow up.    medically clear for dc planning once clear by psyc.  psyc f/u appreciated.     - Dr. JAKE Morales ([facility])  - [phone]
- remain calm and symptoms improving. maybe okay with home with outpt psyc clinic follow up.    medically clear for dc planning once clear by psyc.  psyc f/u    final psyc meds regimen per the team.     d/w GERMAN Simental.     - Dr. JAKE Morales ([facility])  - [phone]
- remain calm and symptoms improving. maybe okay with home with outpt psyc clinic follow up.    medically clear for dc planning once clear by psyc.  psyc f/u appreciated.     - Dr. JAKE Morales ([facility])  - [phone]

## 2023-11-24 NOTE — PROGRESS NOTE ADULT - PROVIDER SPECIALTY LIST ADULT
Internal Medicine
Hospitalist

## 2023-11-24 NOTE — PROGRESS NOTE ADULT - PROBLEM SELECTOR PLAN 7
DVT ppx: low risk  Diet: CC  Dispo: TBD by behavioral health
DVT ppx: low risk  Diet: CC  Dispo: chuck
DVT ppx: low risk  Diet: CC  Dispo: TBD by behavioral health
DVT ppx: low risk  Diet: CC  Dispo: TBD by behavioral health
DVT ppx: low risk  Diet: CC  Dispo: chuck
DVT ppx: low risk  Diet: CC  Dispo: chuck pending improvement in sodium
DVT ppx: low risk  Diet: CC  Dispo: TBD by behavioral health

## 2023-11-24 NOTE — BH CONSULTATION LIAISON PROGRESS NOTE - NSBHFUPINTERVALCCFT_PSY_A_CORE
has been behaviorally under control, showered, groomed well. Compliant, no impulsivity  Care coordinated with [facility] team, and medicine team-- below discussed

## 2023-11-24 NOTE — BH CONSULTATION LIAISON PROGRESS NOTE - NSBHTIMEACTIVITIESPERFORMED_PSY_A_CORE
chart review, coordinating with [address] [place] [address] team, medicine team, [facility] clinic, education for patient
chart reviewed, coordinated with medicine, coordinated with outpatient psychiatrist,reviewed ed beh notes

## 2023-11-24 NOTE — PROGRESS NOTE ADULT - PROBLEM SELECTOR PLAN 5
on metformin  - trend FS  - CC diet  - ISS

## 2023-11-24 NOTE — BH CONSULTATION LIAISON PROGRESS NOTE - NSBHCONSFOLLOWNEEDS_PSY_ALL_CORE
Needs further psych safety assessment prior to discharge
No psychiatric contraindications to discharge

## 2023-11-24 NOTE — PROGRESS NOTE ADULT - PROBLEM SELECTOR PLAN 4
- added amlodipine 10 mg PO daily 11/20/23  - cont losartan/labetalol as ordered
Hospitalist
Hospitalist
- add amlodipine 10 mg PO daily 11/20/23  - cont losartan/labetalol as ordered
- added amlodipine 10 mg PO daily 11/20/23  - cont losartan/labetalol as ordered
- resume home medications  - trend BP
- resume home medications  - trend BP

## 2023-11-24 NOTE — PROGRESS NOTE ADULT - PROBLEM SELECTOR PROBLEM 3
CVID (common variable immunodeficiency)

## 2023-11-24 NOTE — DISCHARGE NOTE NURSING/CASE MANAGEMENT/SOCIAL WORK - NSDCPEFALRISK_GEN_ALL_CORE
For information on Fall & Injury Prevention, visit: [email or web address] OR  [email or web address] OR  https://www.cdc.gov/steadi/patient.html

## 2023-11-24 NOTE — BH CONSULTATION LIAISON PROGRESS NOTE - NSBHATTESTBILLING_PSY_A_CORE
75927-Xmwbgteuhs OBS or IP - moderate complexity OR 35-49 mins
59801-Kpphjhijri OBS or IP - low complexity OR 25-34 mins
Non-billable
89707-Irejuyxhss OBS or IP - high complexity OR 50-79 mins
34794-Hognwribwp OBS or IP - moderate complexity OR 35-49 mins

## 2023-11-24 NOTE — DISCHARGE NOTE NURSING/CASE MANAGEMENT/SOCIAL WORK - PATIENT PORTAL LINK FT
You can access the FollowMyHealth Patient Portal offered by [facility] by registering at the following website: [email or web address]. By joining [employer]’s FollowMyHealth portal, you will also be able to view your health information using other applications (apps) compatible with our system.

## 2023-11-24 NOTE — PROGRESS NOTE ADULT - SUBJECTIVE AND OBJECTIVE BOX
SUBJECTIVE/ OVERNIGHT EVENTS:  feels well  ambulating around  no cp, no sob, no n/v/d. no abdominal pain.  no headache, no dizziness.   Pt denied SI/HI ideations, denied visual and auditory hallucinations.   await psyc follow up   wants to go home today      --------------------------------------------------------------------------------------------  LABS:                        14.1   5.59  )-----------( 193      ( 23 Nov 2023 05:10 )             41.6     11-23    133<L>  |  97<L>  |  12  ----------------------------<  78  4.6   |  23  |  0.82    Ca    9.7      23 Nov 2023 05:10  Phos  3.6     11-23  Mg     2.00     11-23        CAPILLARY BLOOD GLUCOSE      POCT Blood Glucose.: 117 mg/dL (24 Nov 2023 07:30)  POCT Blood Glucose.: 110 mg/dL (23 Nov 2023 21:21)  POCT Blood Glucose.: 97 mg/dL (23 Nov 2023 16:34)        Urinalysis Basic - ( 23 Nov 2023 05:10 )    Color: x / Appearance: x / SG: x / pH: x  Gluc: 78 mg/dL / Ketone: x  / Bili: x / Urobili: x   Blood: x / Protein: x / Nitrite: x   Leuk Esterase: x / RBC: x / WBC x   Sq Epi: x / Non Sq Epi: x / Bacteria: x        RADIOLOGY & ADDITIONAL TESTS:    Imaging Personally Reviewed:  [x] YES  [ ] NO    Consultant(s) Notes Reviewed:  [x] YES  [ ] NO    MEDICATIONS  (STANDING):  amLODIPine   Tablet 10 milliGRAM(s) Oral daily  ARIPiprazole 5 milliGRAM(s) Oral daily  atorvastatin 40 milliGRAM(s) Oral at bedtime  budesonide  80 MICROgram(s)/formoterol 4.5 MICROgram(s) Inhaler 2 Puff(s) Inhalation two times a day  dextrose 5%. 1000 milliLiter(s) (50 mL/Hr) IV Continuous <Continuous>  dextrose 5%. 1000 milliLiter(s) (100 mL/Hr) IV Continuous <Continuous>  dextrose 50% Injectable 25 Gram(s) IV Push once  dextrose 50% Injectable 25 Gram(s) IV Push once  dextrose 50% Injectable 12.5 Gram(s) IV Push once  glucagon  Injectable 1 milliGRAM(s) IntraMuscular once  insulin lispro (ADMELOG) corrective regimen sliding scale   SubCutaneous three times a day before meals  insulin lispro (ADMELOG) corrective regimen sliding scale   SubCutaneous at bedtime  labetalol 200 milliGRAM(s) Oral two times a day  levothyroxine 50 MICROGram(s) Oral daily  losartan 100 milliGRAM(s) Oral daily  tiotropium 2.5 MICROgram(s) Inhaler 2 Puff(s) Inhalation daily    MEDICATIONS  (PRN):  acetaminophen     Tablet .. 650 milliGRAM(s) Oral every 6 hours PRN Temp greater or equal to 38C (100.4F), Mild Pain (1 - 3)  dextrose Oral Gel 15 Gram(s) Oral once PRN Blood Glucose LESS THAN 70 milliGRAM(s)/deciliter  LORazepam   Injectable 2 milliGRAM(s) IntraMuscular every 6 hours PRN Agitation  melatonin 3 milliGRAM(s) Oral at bedtime PRN Insomnia  OLANZapine Injectable 5 milliGRAM(s) IntraMuscular every 4 hours PRN Severe agitation      Care Discussed with Consultants/Other Providers [x] YES  [ ] NO    Vital Signs Last 24 Hrs  T(C): 36.8 (24 Nov 2023 06:06), Max: 36.8 (24 Nov 2023 06:06)  T(F): 98.2 (24 Nov 2023 06:06), Max: 98.2 (24 Nov 2023 06:06)  HR: 68 (24 Nov 2023 06:06) (58 - 68)  BP: 140/87 (24 Nov 2023 06:06) (140/87 - 145/78)  BP(mean): --  RR: 18 (24 Nov 2023 06:06) (17 - 18)  SpO2: 95% (24 Nov 2023 06:06) (95% - 98%)    Parameters below as of 24 Nov 2023 06:06  Patient On (Oxygen Delivery Method): room air      I&O's Summary    23 Nov 2023 07:01  -  24 Nov 2023 07:00  --------------------------------------------------------  IN: 310 mL / OUT: 0 mL / NET: 310 mL        PHYSICAL EXAM:  GENERAL: NAD, well-developed, comfortable  HEAD:  Atraumatic, Normocephalic  EYES: EOMI, PERRLA, conjunctiva and sclera clear  NECK: Supple, No JVD  CHEST/LUNG: Clear to auscultation bilaterally; No wheeze  HEART: Regular rate and rhythm; No murmurs, rubs, or gallops  ABDOMEN: Soft, Nontender, Nondistended; Bowel sounds present  Neuro: AAOx3, no focal weakness, 5/5 b/l extremity strength  EXTREMITIES:  2+ Peripheral Pulses, No clubbing, cyanosis, or edema  SKIN: No rashes or lesions

## 2023-11-24 NOTE — DISCHARGE NOTE NURSING/CASE MANAGEMENT/SOCIAL WORK - NSDCPEWEB_GEN_ALL_CORE
[facility] for Tobacco Control website --- [email or web address] website --- [email or web address]

## 2023-11-24 NOTE — PROGRESS NOTE ADULT - ASSESSMENT
58M with HTN, T2DM (on metformin), CVID with hypogammaglobulinema, schizoaffective disorder with multiple psych hospitalizations who presents with auditory hallucinations, also found to have asymptomatic hyponatremia likely 2/2 polydypsia. 
58 M with HTN, T2DM (on metformin), CVID with hypogammaglobulinema, schizoaffective disorder with multiple psych hospitalizations who presents with auditory hallucinations, also found to have asymptomatic hyponatremia likely 2/2 polydypsia. 
58M with HTN, T2DM (on metformin), CVID with hypogammaglobulinema, schizoaffective disorder with multiple psych hospitalizations who presents with auditory hallucinations, also found to have asymptomatic hyponatremia likely 2/2 polydypsia. 

## 2023-11-24 NOTE — PROVIDER CONTACT NOTE (OTHER) - RECOMMENDATIONS
review vitals
N/A
provider notified
review vitals
Pt receiving his standing dose of Labetalol. Will repeat BP 1 hour after administration.

## 2023-11-24 NOTE — DISCHARGE NOTE NURSING/CASE MANAGEMENT/SOCIAL WORK - NSDCPEEMAIL_GEN_ALL_CORE
[facility] for Tobacco Control email [email or web address]

## 2023-11-24 NOTE — PROVIDER CONTACT NOTE (OTHER) - REASON
Elevated blood pressure
/96
Repeat blood pressure remains elevated.
Transportation for D/C.
180/95 BP out of parameter no signs or symptoms of chest pain or sob

## 2023-12-07 ENCOUNTER — APPOINTMENT (OUTPATIENT)
Dept: RHEUMATOLOGY | Facility: CLINIC | Age: 58
End: 2023-12-07
Payer: MEDICARE

## 2023-12-07 VITALS
DIASTOLIC BLOOD PRESSURE: 78 MMHG | SYSTOLIC BLOOD PRESSURE: 143 MMHG | HEART RATE: 68 BPM | RESPIRATION RATE: 16 BRPM | TEMPERATURE: 97.9 F | OXYGEN SATURATION: 96 %

## 2023-12-07 VITALS
HEART RATE: 60 BPM | DIASTOLIC BLOOD PRESSURE: 82 MMHG | RESPIRATION RATE: 16 BRPM | OXYGEN SATURATION: 96 % | SYSTOLIC BLOOD PRESSURE: 155 MMHG

## 2023-12-07 PROCEDURE — 96366 THER/PROPH/DIAG IV INF ADDON: CPT

## 2023-12-07 PROCEDURE — 96365 THER/PROPH/DIAG IV INF INIT: CPT

## 2023-12-07 RX ORDER — ACETAMINOPHEN 325 MG/1
325 TABLET ORAL
Qty: 0 | Refills: 0 | Status: COMPLETED
Start: 2023-04-05

## 2023-12-07 RX ORDER — IMMUNE GLOBULIN INTRAVENOUS (HUMAN) 10 G
10 KIT INTRAVENOUS
Qty: 0 | Refills: 0 | Status: COMPLETED | OUTPATIENT
Start: 2023-08-18

## 2023-12-07 RX ORDER — DIPHENHYDRAMINE HCL 25 MG/1
25 TABLET ORAL
Qty: 0 | Refills: 0 | Status: COMPLETED
Start: 2023-04-05

## 2023-12-11 ENCOUNTER — APPOINTMENT (OUTPATIENT)
Dept: ALLERGY | Facility: CLINIC | Age: 58
End: 2023-12-11
Payer: MEDICARE

## 2023-12-11 VITALS
BODY MASS INDEX: 33.51 KG/M2 | TEMPERATURE: 98.3 F | DIASTOLIC BLOOD PRESSURE: 66 MMHG | OXYGEN SATURATION: 95 % | HEART RATE: 83 BPM | WEIGHT: 261 LBS | SYSTOLIC BLOOD PRESSURE: 148 MMHG

## 2023-12-11 PROCEDURE — 99214 OFFICE O/P EST MOD 30 MIN: CPT

## 2023-12-11 RX ORDER — AMOXICILLIN AND CLAVULANATE POTASSIUM 875; 125 MG/1; MG/1
875-125 TABLET, COATED ORAL
Qty: 28 | Refills: 0 | Status: ACTIVE | COMMUNITY
Start: 2023-12-11 | End: 1900-01-01

## 2024-01-02 RX ORDER — IMMUNE GLOBULIN INTRAVENOUS (HUMAN) 10 G
10 KIT INTRAVENOUS
Qty: 0 | Refills: 0 | Status: COMPLETED | OUTPATIENT
Start: 2023-03-28 | End: 1900-01-01

## 2024-01-04 ENCOUNTER — APPOINTMENT (OUTPATIENT)
Dept: RHEUMATOLOGY | Facility: CLINIC | Age: 59
End: 2024-01-04
Payer: MEDICARE

## 2024-01-04 VITALS
OXYGEN SATURATION: 95 % | SYSTOLIC BLOOD PRESSURE: 173 MMHG | HEART RATE: 64 BPM | DIASTOLIC BLOOD PRESSURE: 74 MMHG | RESPIRATION RATE: 16 BRPM

## 2024-01-04 VITALS
RESPIRATION RATE: 16 BRPM | OXYGEN SATURATION: 95 % | TEMPERATURE: 98.1 F | DIASTOLIC BLOOD PRESSURE: 84 MMHG | SYSTOLIC BLOOD PRESSURE: 153 MMHG | HEART RATE: 69 BPM

## 2024-01-04 PROCEDURE — 96366 THER/PROPH/DIAG IV INF ADDON: CPT

## 2024-01-04 PROCEDURE — 96365 THER/PROPH/DIAG IV INF INIT: CPT

## 2024-01-04 RX ORDER — DIPHENHYDRAMINE HCL 25 MG/1
25 TABLET ORAL
Qty: 0 | Refills: 0 | Status: COMPLETED
Start: 2023-04-05

## 2024-01-04 RX ORDER — IMMUNE GLOBULIN INTRAVENOUS (HUMAN) 10 G
10 KIT INTRAVENOUS
Qty: 0 | Refills: 0 | Status: COMPLETED | OUTPATIENT
Start: 2023-12-08

## 2024-01-04 RX ORDER — ACETAMINOPHEN 325 MG/1
325 TABLET ORAL
Qty: 0 | Refills: 0 | Status: COMPLETED
Start: 2023-04-05

## 2024-01-04 NOTE — HISTORY OF PRESENT ILLNESS
[N/A] : N/A [Denies] : Denies [Yes] : Yes [Declined] : Declined [Right upper extremity] : Right upper extremity [24g] : 24g [Start Time: ___] : Medication Start Time: [unfilled] [End Time: ___] : Medication End Time: [unfilled] [Medication Name: ___] : Medication Name: [unfilled] [Total Amount Administered: ___] : Total Amount Administered: [unfilled] [IV discontinued. Intact. No signs or symptoms of IV complications noted. Time: ___] : IV discontinued. Intact. No signs or symptoms of IV complications noted. Time: [unfilled] [Patient  instructed to seek medical attention with signs and symptoms of adverse effects] : Patient  instructed to seek medical attention with signs and symptoms of adverse effects [Patient left unit in no acute distress] : Patient left unit in no acute distress [Medications administered as ordered and tolerated well.] : Medications administered as ordered and tolerated well. [de-identified] : (L) leg wound [de-identified] : dorsal hand vein [de-identified] : Patient presents for Gammagard S/D infusion, doing well overall. Patient denies any recent infection or antibiotics use within the past 2 weeks. Patient reports (L) leg wound is continuing to heal and is dressed every other day when showering. Patient reports sometimes picking at the wound but putting an ointment on the wound which helps. No other symptoms or concerns noted at this time. Patient pre-medicated, infusion titrated and tolerated well.

## 2024-01-13 NOTE — H&P ADULT - NSHPPOADEEPVENOUSTHROMB_GEN_A_CORE
Pt arrives via BLS from outside a shelter c/o being outside since 11am. C/o 9/10 ab dior, N/V. Pt was vomiting in the bathroom. Aox4. Ambulatory. Hx of Type 1 diabetic. BS was 256.   
no

## 2024-01-18 ENCOUNTER — APPOINTMENT (OUTPATIENT)
Dept: ALLERGY | Facility: CLINIC | Age: 59
End: 2024-01-18
Payer: MEDICARE

## 2024-01-18 PROCEDURE — 99214 OFFICE O/P EST MOD 30 MIN: CPT

## 2024-01-18 NOTE — ASSESSMENT
[FreeTextEntry1] : CVID  Recurrent bronchitis Tobacco addition   Continue IVIG Continue Trelegy  Discussed smoking cessation with patient  No evidence of any mass on his neck

## 2024-01-18 NOTE — PHYSICAL EXAM
[Alert] : alert [Well Nourished] : well nourished [No Acute Distress] : no acute distress [Well Developed] : well developed [No Neck Mass] : no neck mass was observed [Normal Rate and Effort] : normal respiratory rhythm and effort [No Crackles] : no crackles [No Retractions] : no retractions [Wheezing] : no wheezing was heard [Normal Rate] : heart rate was normal  [Normal S1, S2] : normal S1 and S2 [No murmur] : no murmur [Regular Rhythm] : with a regular rhythm

## 2024-01-18 NOTE — HISTORY OF PRESENT ILLNESS
[de-identified] : Patient continues to smoke about 1/2 ppd - no chest congestion - he is taking Trelegy 200 QD - no chest symptoms - concerned about his neck if there is a lump

## 2024-02-01 ENCOUNTER — APPOINTMENT (OUTPATIENT)
Dept: RHEUMATOLOGY | Facility: CLINIC | Age: 59
End: 2024-02-01
Payer: MEDICARE

## 2024-02-01 VITALS
OXYGEN SATURATION: 97 % | SYSTOLIC BLOOD PRESSURE: 142 MMHG | TEMPERATURE: 98.4 F | HEART RATE: 67 BPM | DIASTOLIC BLOOD PRESSURE: 86 MMHG | RESPIRATION RATE: 16 BRPM

## 2024-02-01 VITALS
RESPIRATION RATE: 16 BRPM | OXYGEN SATURATION: 97 % | DIASTOLIC BLOOD PRESSURE: 89 MMHG | HEART RATE: 68 BPM | SYSTOLIC BLOOD PRESSURE: 168 MMHG

## 2024-02-01 PROCEDURE — 96365 THER/PROPH/DIAG IV INF INIT: CPT

## 2024-02-01 PROCEDURE — 96366 THER/PROPH/DIAG IV INF ADDON: CPT

## 2024-02-01 RX ORDER — ACETAMINOPHEN 325 MG/1
325 TABLET ORAL
Qty: 0 | Refills: 0 | Status: COMPLETED
Start: 2023-04-05

## 2024-02-01 RX ORDER — IMMUNE GLOBULIN INTRAVENOUS (HUMAN) 10 G
10 KIT INTRAVENOUS
Qty: 0 | Refills: 0 | Status: COMPLETED | OUTPATIENT
Start: 2023-10-13

## 2024-02-01 RX ORDER — DIPHENHYDRAMINE HCL 25 MG/1
25 TABLET ORAL
Qty: 0 | Refills: 0 | Status: COMPLETED
Start: 2023-04-05

## 2024-02-01 NOTE — HISTORY OF PRESENT ILLNESS
[N/A] : N/A [Denies] : Denies [No] : No [Yes] : Yes [24g] : 24g [Start Time: ___] : Medication Start Time: [unfilled] [End Time: ___] : Medication End Time: [unfilled] [Medication Name: ___] : Medication Name: [unfilled] [Total Amount Administered: ___] : Total Amount Administered: [unfilled] [IV discontinued. Intact. No signs or symptoms of IV complications noted. Time: ___] : IV discontinued. Intact. No signs or symptoms of IV complications noted. Time: [unfilled] [Patient  instructed to seek medical attention with signs and symptoms of adverse effects] : Patient  instructed to seek medical attention with signs and symptoms of adverse effects [Patient left unit in no acute distress] : Patient left unit in no acute distress [Medications administered as ordered and tolerated well.] : Medications administered as ordered and tolerated well. [de-identified] : left leg wound [de-identified] : right hand dorsal vein  [de-identified] : no labs [de-identified] : Patient presents for scheduled Gammagard S/D infusion, ambulatory in [facility]. Patient reports recent hospitalization for dizziness, chest pain and SOB. Patient continues to reports having wound to his left leg, and is continuing to heal and is dressed every other day when showering. Otherwise, patient denies recent infection or ABX use. No other symptoms or concerns verbalized at this time. Patient pre-medicated, infusion titrated as per protocol and tolerated well.

## 2024-02-07 NOTE — ED PROVIDER NOTE - PROGRESS NOTE3
Detail Level: Detailed Was A Bandage Applied: Yes Punch Size In Mm: 4 Size Of Lesion In Cm (Optional): 0 Depth Of Punch Biopsy: dermis Biopsy Type: H and E Anesthesia Type: 1% lidocaine with epinephrine Anesthesia Volume In Cc: 0.5 Hemostasis: None Epidermal Sutures: 4-0 Prolene Number Of Epidermal Sutures (Optional): 2 Wound Care: Petrolatum Dressing: bandage Suture Removal: 10 days Patient Will Remove Sutures At Home?: No Lab: [id number] Stable. Lab Facility: [id number] Consent: Written consent was obtained and risks were reviewed including but not limited to scarring, infection, bleeding, scabbing, incomplete removal, nerve damage and allergy to anesthesia. Post-Care Instructions: I reviewed with the patient in detail post-care instructions. Patient is to keep the biopsy site dry overnight, and then apply bacitracin twice daily until healed. Patient may apply hydrogen peroxide soaks to remove any crusting. Home Suture Removal Text: Patient was provided a home suture removal kit and will remove their sutures at home.  If they have any questions or difficulties they will call the office. Notification Instructions: Patient will be notified of biopsy results. However, patient instructed to call the office if not contacted within 2 weeks. Billing Type: Third-Party Bill Information: Selecting Yes will display possible errors in your note based on the variables you have selected. This validation is only offered as a suggestion for you. PLEASE NOTE THAT THE VALIDATION TEXT WILL BE REMOVED WHEN YOU FINALIZE YOUR NOTE. IF YOU WANT TO FAX A PRELIMINARY NOTE YOU WILL NEED TO TOGGLE THIS TO 'NO' IF YOU DO NOT WANT IT IN YOUR FAXED NOTE.

## 2024-02-22 ENCOUNTER — NON-APPOINTMENT (OUTPATIENT)
Age: 59
End: 2024-02-22

## 2024-02-22 RX ORDER — AMOXICILLIN AND CLAVULANATE POTASSIUM 875; 125 MG/1; MG/1
875-125 TABLET, COATED ORAL
Qty: 20 | Refills: 0 | Status: ACTIVE | COMMUNITY
Start: 2024-02-22 | End: 1900-01-01

## 2024-02-29 ENCOUNTER — APPOINTMENT (OUTPATIENT)
Dept: RHEUMATOLOGY | Facility: CLINIC | Age: 59
End: 2024-02-29
Payer: MEDICARE

## 2024-02-29 VITALS
SYSTOLIC BLOOD PRESSURE: 172 MMHG | RESPIRATION RATE: 16 BRPM | HEART RATE: 74 BPM | DIASTOLIC BLOOD PRESSURE: 99 MMHG | TEMPERATURE: 98.1 F | OXYGEN SATURATION: 97 %

## 2024-02-29 VITALS
OXYGEN SATURATION: 96 % | RESPIRATION RATE: 16 BRPM | HEART RATE: 64 BPM | DIASTOLIC BLOOD PRESSURE: 107 MMHG | SYSTOLIC BLOOD PRESSURE: 178 MMHG

## 2024-02-29 PROCEDURE — 96365 THER/PROPH/DIAG IV INF INIT: CPT

## 2024-02-29 PROCEDURE — 96366 THER/PROPH/DIAG IV INF ADDON: CPT

## 2024-02-29 RX ORDER — ACETAMINOPHEN 325 MG/1
325 TABLET ORAL
Qty: 0 | Refills: 0 | Status: COMPLETED
Start: 2023-04-05

## 2024-02-29 RX ORDER — DIPHENHYDRAMINE HCL 25 MG/1
25 TABLET ORAL
Qty: 0 | Refills: 0 | Status: COMPLETED
Start: 2023-04-05

## 2024-02-29 RX ORDER — IMMUNE GLOBULIN INTRAVENOUS (HUMAN) 10 G
10 KIT INTRAVENOUS
Qty: 0 | Refills: 0 | Status: COMPLETED
Start: 2023-03-28

## 2024-02-29 NOTE — HISTORY OF PRESENT ILLNESS
[Denies] : Denies [N/A] : N/A [No] : No [Yes] : Yes [24g] : 24g [Start Time: ___] : Medication Start Time: [unfilled] [End Time: ___] : Medication End Time: [unfilled] [Medication Name: ___] : Medication Name: [unfilled] [Total Amount Administered: ___] : Total Amount Administered: [unfilled] [IV discontinued. Intact. No signs or symptoms of IV complications noted. Time: ___] : IV discontinued. Intact. No signs or symptoms of IV complications noted. Time: [unfilled] [Patient  instructed to seek medical attention with signs and symptoms of adverse effects] : Patient  instructed to seek medical attention with signs and symptoms of adverse effects [Patient left unit in no acute distress] : Patient left unit in no acute distress [Medications administered as ordered and tolerated well.] : Medications administered as ordered and tolerated well. [de-identified] : sinus and nasal and chest congestion/ on ABX  for 10 days [de-identified] : left leg wound [de-identified] : left hand dorsal vein  [de-identified] : Patient presents for scheduled Gammagard S/D infusion, ambulatory in [facility]. Patient reports having "sinus infection and chest cold" taking ABX as prescribed over the phone by MD. Reports intermittent cough with greenish phlegm.  Patient continues to reports having wound to his left leg and is continuing to heal and is dressed every other day when showering. No other symptoms or concerns verbalized at this time. Patient pre-medicated, infusion titrated as per protocol and tolerated well. Patient noted with elevated BP/ asymptomatic, patient also admits taking his BP meds as prescribed/ strongly advised to f/u with MD/ agreeable for the same. Patient left unit ambulatory in [facility].  [de-identified] : no labs

## 2024-03-07 ENCOUNTER — INPATIENT (INPATIENT)
Facility: HOSPITAL | Age: 59
LOS: 7 days | Discharge: HOME | End: 2024-03-15
Attending: STUDENT IN AN ORGANIZED HEALTH CARE EDUCATION/TRAINING PROGRAM | Admitting: STUDENT IN AN ORGANIZED HEALTH CARE EDUCATION/TRAINING PROGRAM
Payer: MEDICARE

## 2024-03-07 VITALS
OXYGEN SATURATION: 97 % | TEMPERATURE: 98 F | DIASTOLIC BLOOD PRESSURE: 104 MMHG | SYSTOLIC BLOOD PRESSURE: 203 MMHG | HEART RATE: 83 BPM | RESPIRATION RATE: 18 BRPM

## 2024-03-07 DIAGNOSIS — K08.199 COMPLETE LOSS OF TEETH DUE TO OTHER SPECIFIED CAUSE, UNSPECIFIED CLASS: Chronic | ICD-10-CM

## 2024-03-07 DIAGNOSIS — J34.2 DEVIATED NASAL SEPTUM: Chronic | ICD-10-CM

## 2024-03-07 LAB
A1C WITH ESTIMATED AVERAGE GLUCOSE RESULT: 5.6 % — SIGNIFICANT CHANGE UP (ref 4–5.6)
ALBUMIN SERPL ELPH-MCNC: 4.1 G/DL — SIGNIFICANT CHANGE UP (ref 3.3–5)
ALP SERPL-CCNC: 134 U/L — HIGH (ref 40–120)
ALT FLD-CCNC: 20 U/L — SIGNIFICANT CHANGE UP (ref 4–41)
AMPHET UR-MCNC: NEGATIVE — SIGNIFICANT CHANGE UP
ANION GAP SERPL CALC-SCNC: 11 MMOL/L — SIGNIFICANT CHANGE UP (ref 7–14)
ANION GAP SERPL CALC-SCNC: 12 MMOL/L — SIGNIFICANT CHANGE UP (ref 7–14)
APAP SERPL-MCNC: <10 UG/ML — LOW (ref 15–25)
APPEARANCE UR: CLEAR — SIGNIFICANT CHANGE UP
AST SERPL-CCNC: 22 U/L — SIGNIFICANT CHANGE UP (ref 4–40)
BACTERIA # UR AUTO: NEGATIVE /HPF — SIGNIFICANT CHANGE UP
BARBITURATES UR SCN-MCNC: NEGATIVE — SIGNIFICANT CHANGE UP
BASOPHILS # BLD AUTO: 0.02 K/UL — SIGNIFICANT CHANGE UP (ref 0–0.2)
BASOPHILS NFR BLD AUTO: 0.3 % — SIGNIFICANT CHANGE UP (ref 0–2)
BENZODIAZ UR-MCNC: NEGATIVE — SIGNIFICANT CHANGE UP
BILIRUB SERPL-MCNC: 0.3 MG/DL — SIGNIFICANT CHANGE UP (ref 0.2–1.2)
BILIRUB UR-MCNC: NEGATIVE — SIGNIFICANT CHANGE UP
BUN SERPL-MCNC: 10 MG/DL — SIGNIFICANT CHANGE UP (ref 7–23)
BUN SERPL-MCNC: 9 MG/DL — SIGNIFICANT CHANGE UP (ref 7–23)
CALCIUM SERPL-MCNC: 9.5 MG/DL — SIGNIFICANT CHANGE UP (ref 8.4–10.5)
CALCIUM SERPL-MCNC: 9.5 MG/DL — SIGNIFICANT CHANGE UP (ref 8.4–10.5)
CAST: 0 /LPF — SIGNIFICANT CHANGE UP (ref 0–4)
CHLORIDE SERPL-SCNC: 93 MMOL/L — LOW (ref 98–107)
CHLORIDE SERPL-SCNC: 95 MMOL/L — LOW (ref 98–107)
CO2 SERPL-SCNC: 24 MMOL/L — SIGNIFICANT CHANGE UP (ref 22–31)
CO2 SERPL-SCNC: 26 MMOL/L — SIGNIFICANT CHANGE UP (ref 22–31)
COCAINE METAB.OTHER UR-MCNC: NEGATIVE — SIGNIFICANT CHANGE UP
COLOR SPEC: YELLOW — SIGNIFICANT CHANGE UP
CREAT SERPL-MCNC: 0.81 MG/DL — SIGNIFICANT CHANGE UP (ref 0.5–1.3)
CREAT SERPL-MCNC: 0.84 MG/DL — SIGNIFICANT CHANGE UP (ref 0.5–1.3)
CREATININE URINE RESULT, DAU: 17 MG/DL — SIGNIFICANT CHANGE UP
DIFF PNL FLD: NEGATIVE — SIGNIFICANT CHANGE UP
EGFR: 102 ML/MIN/1.73M2 — SIGNIFICANT CHANGE UP
EGFR: 103 ML/MIN/1.73M2 — SIGNIFICANT CHANGE UP
EOSINOPHIL # BLD AUTO: 0.14 K/UL — SIGNIFICANT CHANGE UP (ref 0–0.5)
EOSINOPHIL NFR BLD AUTO: 1.9 % — SIGNIFICANT CHANGE UP (ref 0–6)
ESTIMATED AVERAGE GLUCOSE: 114 — SIGNIFICANT CHANGE UP
ETHANOL SERPL-MCNC: <10 MG/DL — SIGNIFICANT CHANGE UP
FLUAV AG NPH QL: SIGNIFICANT CHANGE UP
FLUBV AG NPH QL: SIGNIFICANT CHANGE UP
GLUCOSE SERPL-MCNC: 100 MG/DL — HIGH (ref 70–99)
GLUCOSE SERPL-MCNC: 106 MG/DL — HIGH (ref 70–99)
GLUCOSE UR QL: NEGATIVE MG/DL — SIGNIFICANT CHANGE UP
HCT VFR BLD CALC: 38.8 % — LOW (ref 39–50)
HGB BLD-MCNC: 13.3 G/DL — SIGNIFICANT CHANGE UP (ref 13–17)
IANC: 5.69 K/UL — SIGNIFICANT CHANGE UP (ref 1.8–7.4)
IMM GRANULOCYTES NFR BLD AUTO: 0.3 % — SIGNIFICANT CHANGE UP (ref 0–0.9)
KETONES UR-MCNC: NEGATIVE MG/DL — SIGNIFICANT CHANGE UP
LEUKOCYTE ESTERASE UR-ACNC: NEGATIVE — SIGNIFICANT CHANGE UP
LYMPHOCYTES # BLD AUTO: 0.98 K/UL — LOW (ref 1–3.3)
LYMPHOCYTES # BLD AUTO: 13 % — SIGNIFICANT CHANGE UP (ref 13–44)
MCHC RBC-ENTMCNC: 28.4 PG — SIGNIFICANT CHANGE UP (ref 27–34)
MCHC RBC-ENTMCNC: 34.3 GM/DL — SIGNIFICANT CHANGE UP (ref 32–36)
MCV RBC AUTO: 82.9 FL — SIGNIFICANT CHANGE UP (ref 80–100)
METHADONE UR-MCNC: NEGATIVE — SIGNIFICANT CHANGE UP
MONOCYTES # BLD AUTO: 0.71 K/UL — SIGNIFICANT CHANGE UP (ref 0–0.9)
MONOCYTES NFR BLD AUTO: 9.4 % — SIGNIFICANT CHANGE UP (ref 2–14)
NEUTROPHILS # BLD AUTO: 5.69 K/UL — SIGNIFICANT CHANGE UP (ref 1.8–7.4)
NEUTROPHILS NFR BLD AUTO: 75.1 % — SIGNIFICANT CHANGE UP (ref 43–77)
NITRITE UR-MCNC: NEGATIVE — SIGNIFICANT CHANGE UP
NRBC # BLD: 0 /100 WBCS — SIGNIFICANT CHANGE UP (ref 0–0)
NRBC # FLD: 0 K/UL — SIGNIFICANT CHANGE UP (ref 0–0)
OPIATES UR-MCNC: NEGATIVE — SIGNIFICANT CHANGE UP
OXYCODONE UR-MCNC: NEGATIVE — SIGNIFICANT CHANGE UP
PCP SPEC-MCNC: SIGNIFICANT CHANGE UP
PCP UR-MCNC: NEGATIVE — SIGNIFICANT CHANGE UP
PH UR: 7 — SIGNIFICANT CHANGE UP (ref 5–8)
PLATELET # BLD AUTO: 189 K/UL — SIGNIFICANT CHANGE UP (ref 150–400)
POTASSIUM SERPL-MCNC: 4 MMOL/L — SIGNIFICANT CHANGE UP (ref 3.5–5.3)
POTASSIUM SERPL-MCNC: 4.1 MMOL/L — SIGNIFICANT CHANGE UP (ref 3.5–5.3)
POTASSIUM SERPL-SCNC: 4 MMOL/L — SIGNIFICANT CHANGE UP (ref 3.5–5.3)
POTASSIUM SERPL-SCNC: 4.1 MMOL/L — SIGNIFICANT CHANGE UP (ref 3.5–5.3)
PROT SERPL-MCNC: 7.6 G/DL — SIGNIFICANT CHANGE UP (ref 6–8.3)
PROT UR-MCNC: 100 MG/DL
RBC # BLD: 4.68 M/UL — SIGNIFICANT CHANGE UP (ref 4.2–5.8)
RBC # FLD: 14.2 % — SIGNIFICANT CHANGE UP (ref 10.3–14.5)
RBC CASTS # UR COMP ASSIST: 0 /HPF — SIGNIFICANT CHANGE UP (ref 0–4)
RSV RNA NPH QL NAA+NON-PROBE: SIGNIFICANT CHANGE UP
SALICYLATES SERPL-MCNC: <0.3 MG/DL — LOW (ref 15–30)
SARS-COV-2 RNA SPEC QL NAA+PROBE: SIGNIFICANT CHANGE UP
SODIUM SERPL-SCNC: 130 MMOL/L — LOW (ref 135–145)
SODIUM SERPL-SCNC: 131 MMOL/L — LOW (ref 135–145)
SP GR SPEC: 1.01 — SIGNIFICANT CHANGE UP (ref 1–1.03)
SQUAMOUS # UR AUTO: 0 /HPF — SIGNIFICANT CHANGE UP (ref 0–5)
THC UR QL: NEGATIVE — SIGNIFICANT CHANGE UP
TSH SERPL-MCNC: 2.33 UIU/ML — SIGNIFICANT CHANGE UP (ref 0.27–4.2)
UROBILINOGEN FLD QL: 0.2 MG/DL — SIGNIFICANT CHANGE UP (ref 0.2–1)
WBC # BLD: 7.56 K/UL — SIGNIFICANT CHANGE UP (ref 3.8–10.5)
WBC # FLD AUTO: 7.56 K/UL — SIGNIFICANT CHANGE UP (ref 3.8–10.5)
WBC UR QL: 0 /HPF — SIGNIFICANT CHANGE UP (ref 0–5)

## 2024-03-07 PROCEDURE — 71045 X-RAY EXAM CHEST 1 VIEW: CPT | Mod: 26

## 2024-03-07 PROCEDURE — 99285 EMERGENCY DEPT VISIT HI MDM: CPT

## 2024-03-07 RX ORDER — ATORVASTATIN CALCIUM 80 MG/1
40 TABLET, FILM COATED ORAL AT BEDTIME
Refills: 0 | Status: DISCONTINUED | OUTPATIENT
Start: 2024-03-08 | End: 2024-03-15

## 2024-03-07 RX ORDER — DEXTROSE 50 % IN WATER 50 %
25 SYRINGE (ML) INTRAVENOUS ONCE
Refills: 0 | Status: DISCONTINUED | OUTPATIENT
Start: 2024-03-08 | End: 2024-03-15

## 2024-03-07 RX ORDER — DEXTROSE 50 % IN WATER 50 %
12.5 SYRINGE (ML) INTRAVENOUS ONCE
Refills: 0 | Status: DISCONTINUED | OUTPATIENT
Start: 2024-03-08 | End: 2024-03-15

## 2024-03-07 RX ORDER — SODIUM CHLORIDE 9 MG/ML
1000 INJECTION, SOLUTION INTRAVENOUS
Refills: 0 | Status: DISCONTINUED | OUTPATIENT
Start: 2024-03-08 | End: 2024-03-15

## 2024-03-07 RX ORDER — METFORMIN HYDROCHLORIDE 850 MG/1
500 TABLET ORAL
Refills: 0 | Status: DISCONTINUED | OUTPATIENT
Start: 2024-03-08 | End: 2024-03-15

## 2024-03-07 RX ORDER — ARIPIPRAZOLE 15 MG/1
5 TABLET ORAL ONCE
Refills: 0 | Status: COMPLETED | OUTPATIENT
Start: 2024-03-07 | End: 2024-03-07

## 2024-03-07 RX ORDER — INSULIN LISPRO 100/ML
VIAL (ML) SUBCUTANEOUS
Refills: 0 | Status: DISCONTINUED | OUTPATIENT
Start: 2024-03-07 | End: 2024-03-08

## 2024-03-07 RX ORDER — GLUCAGON INJECTION, SOLUTION 0.5 MG/.1ML
1 INJECTION, SOLUTION SUBCUTANEOUS ONCE
Refills: 0 | Status: DISCONTINUED | OUTPATIENT
Start: 2024-03-08 | End: 2024-03-15

## 2024-03-07 RX ORDER — SODIUM CHLORIDE 9 MG/ML
1000 INJECTION INTRAMUSCULAR; INTRAVENOUS; SUBCUTANEOUS ONCE
Refills: 0 | Status: COMPLETED | OUTPATIENT
Start: 2024-03-07 | End: 2024-03-07

## 2024-03-07 RX ORDER — HALOPERIDOL DECANOATE 100 MG/ML
5 INJECTION INTRAMUSCULAR EVERY 6 HOURS
Refills: 0 | Status: DISCONTINUED | OUTPATIENT
Start: 2024-03-08 | End: 2024-03-15

## 2024-03-07 RX ORDER — LABETALOL HCL 100 MG
200 TABLET ORAL
Refills: 0 | Status: DISCONTINUED | OUTPATIENT
Start: 2024-03-07 | End: 2024-03-08

## 2024-03-07 RX ORDER — LOSARTAN POTASSIUM 100 MG/1
100 TABLET, FILM COATED ORAL DAILY
Refills: 0 | Status: COMPLETED | OUTPATIENT
Start: 2024-03-07 | End: 2024-03-08

## 2024-03-07 RX ORDER — HALOPERIDOL DECANOATE 100 MG/ML
5 INJECTION INTRAMUSCULAR ONCE
Refills: 0 | Status: DISCONTINUED | OUTPATIENT
Start: 2024-03-08 | End: 2024-03-15

## 2024-03-07 RX ORDER — LEVOTHYROXINE SODIUM 125 MCG
50 TABLET ORAL DAILY
Refills: 0 | Status: DISCONTINUED | OUTPATIENT
Start: 2024-03-08 | End: 2024-03-15

## 2024-03-07 RX ORDER — NICOTINE POLACRILEX 2 MG
2 GUM BUCCAL
Refills: 0 | Status: DISCONTINUED | OUTPATIENT
Start: 2024-03-08 | End: 2024-03-15

## 2024-03-07 RX ORDER — DEXTROSE 50 % IN WATER 50 %
25 SYRINGE (ML) INTRAVENOUS ONCE
Refills: 0 | Status: DISCONTINUED | OUTPATIENT
Start: 2024-03-07 | End: 2024-03-08

## 2024-03-07 RX ORDER — LOSARTAN POTASSIUM 100 MG/1
100 TABLET, FILM COATED ORAL DAILY
Refills: 0 | Status: DISCONTINUED | OUTPATIENT
Start: 2024-03-08 | End: 2024-03-15

## 2024-03-07 RX ORDER — GLUCAGON INJECTION, SOLUTION 0.5 MG/.1ML
1 INJECTION, SOLUTION SUBCUTANEOUS ONCE
Refills: 0 | Status: DISCONTINUED | OUTPATIENT
Start: 2024-03-07 | End: 2024-03-08

## 2024-03-07 RX ORDER — SODIUM CHLORIDE 9 MG/ML
1000 INJECTION, SOLUTION INTRAVENOUS
Refills: 0 | Status: DISCONTINUED | OUTPATIENT
Start: 2024-03-07 | End: 2024-03-08

## 2024-03-07 RX ORDER — INSULIN LISPRO 100/ML
VIAL (ML) SUBCUTANEOUS
Refills: 0 | Status: DISCONTINUED | OUTPATIENT
Start: 2024-03-08 | End: 2024-03-15

## 2024-03-07 RX ORDER — ACETAMINOPHEN 500 MG
650 TABLET ORAL ONCE
Refills: 0 | Status: COMPLETED | OUTPATIENT
Start: 2024-03-07 | End: 2024-03-07

## 2024-03-07 RX ORDER — DEXTROSE 50 % IN WATER 50 %
15 SYRINGE (ML) INTRAVENOUS ONCE
Refills: 0 | Status: DISCONTINUED | OUTPATIENT
Start: 2024-03-08 | End: 2024-03-15

## 2024-03-07 RX ORDER — LABETALOL HCL 100 MG
200 TABLET ORAL EVERY 12 HOURS
Refills: 0 | Status: DISCONTINUED | OUTPATIENT
Start: 2024-03-08 | End: 2024-03-15

## 2024-03-07 RX ADMIN — ARIPIPRAZOLE 5 MILLIGRAM(S): 15 TABLET ORAL at 23:35

## 2024-03-07 RX ADMIN — SODIUM CHLORIDE 1000 MILLILITER(S): 9 INJECTION INTRAMUSCULAR; INTRAVENOUS; SUBCUTANEOUS at 20:51

## 2024-03-07 RX ADMIN — Medication 200 MILLIGRAM(S): at 22:48

## 2024-03-07 RX ADMIN — Medication 650 MILLIGRAM(S): at 23:34

## 2024-03-07 NOTE — ED BEHAVIORAL HEALTH ASSESSMENT NOTE - SUMMARY
58M, domiciled in supportive housing TSI, SSD, single, PMH HTN, DM, hypogammaglobulinemia, PPHx schizoaffective d/o, bipolar d/o, hx of multiple psych hospitalizations (last known in 2020 at [facility]), denies hx SA/NSSIB, pt in treatment at [facility] AOP[facility] with DR. Cook , and is on BELLA denies drug or alcohol use, denies legal hx;   pt bib self for paranoia.    Patient is presenting with decompensated psychosis in spite of treatment compliance . Patient is endorsing +hi with the plan to stab any one who is wearing a police uniform ,  in the setting of paranoia  and +ah . Patient at this time warrants psychiatric admission for safety and stabilization and amendable to voluntary admission.       Plan:  - Routine observation, no psychiatric indication for CO 1:1, not suicidal , not aggressive  - contiue Abilify 300mg Maintena q4 weeks, next dose is due on 12/14, last dose on 11/16  - Haldol 5mg /ativan 2mg po/im q6hrs prn for agitation  - nicotine replacement  - sliding scale for DM , metformin dose will need to be verified , HTN:  Labetolol (DOSE needs to be verified with the pharmacy), continue atorvastatin 40 milliGRAM(s) Oral at bedtimebudesonide 160 MICROgram(s)/formoterol 4.5 MICROgram(s) Inhaler 2 Puff(s) Inhalation two times a day, cholecalciferol 2000 Unit(s) Oral daily    Addendum: pt is found to have Na 124 , will be medically admitted  pt will need 1:1 while on the medical floor, unpredictable due to psychosis  medical issues : as per medical team  contiue Abilify 300mg Maintena q4 weeks, next dose is due on 12/14, last dose on 11/16  - Haldol 5mg /ativan 2mg po/im q6hrs prn for agitation, CHECK ECG FOR QTC . IF qtc >500msec hold haldol   - nicotine replacement 58M, domiciled in supportive housing TSI, SSD, single, PMH HTN, DM, hypogammaglobulinemia, PPHx schizoaffective d/o, bipolar d/o, hx of multiple psych hospitalizations (last known in 2020 at [facility]), denies hx SA/NSSIB, pt in treatment at [facility] with DR. Cook , and is on BELLA , denies drug or alcohol use, denies legal hx;   pt bib self for  ah , si/hi.     Patient is presenting with decompensated psychosis , with worsening of  +ah, in spite of treatment compliance . Patient is endorsing  +si/+hi with no concrete plan or intent . Patient at this time warrants psychiatric admission for safety and stabilization and amendable to voluntary admission.       Plan:  - Routine observation, no psychiatric indication for CO 1:1, feels safe in the hospital , not aggressive  - contiue Abilify 400mg Maintena q4 weeks, next dose is due on 3/15, will start Abilify 5mg po for psychosis   - Haldol 5mg /ativan 2mg po/im q6hrs prn for agitation  - nicotine replacement  - sliding scale for DM , metformin  500mg bid,  HTN:  Labetolol 200mg bid,  milliGRAM(s) Oral at bedtimebudesonide 160 MICROgram(s)/formoterol 4.5 MICROgram(s) Inhaler 2 Puff(s) Inhalation two times a day,  levothyroxine 50mcg qdaily, losartan 100mg qdaily, atorvastatin 40mg qdaily     Addendum: pt is found to have Na 124 , will be medically admitted  pt will need 1:1 while on the medical floor, unpredictable due to psychosis  medical issues : as per medical team  contiue Abilify 300mg Maintena q4 weeks, next dose is due on 12/14, last dose on 11/16  - Haldol 5mg /ativan 2mg po/im q6hrs prn for agitation, CHECK ECG FOR QTC . IF qtc >500msec hold haldol   - nicotine replacement 58M, domiciled in supportive housing TSI, SSD, single, PMH HTN, DM, hypogammaglobulinemia, PPHx schizoaffective d/o, bipolar d/o, hx of multiple psych hospitalizations (last known in 2020 at [facility]), denies hx SA/NSSIB, pt in treatment at [facility] AOP[facility] with DR. Cook , and is on BELLA , denies drug or alcohol use, denies legal hx;   pt bib self for  ah , si/hi.     Patient is presenting with decompensated psychosis , with worsening of  +ah, in spite of treatment compliance . Patient is endorsing  +si/+hi with no concrete plan or intent . Patient at this time warrants psychiatric admission for safety and stabilization and amendable to voluntary admission.       Plan:  - Routine observation, no psychiatric indication for CO 1:1, feels safe in the hospital , not aggressive  - contiue Abilify 400mg Maintena q4 weeks, next dose is due on 3/15, will start Abilify 5mg po for psychosis   - Haldol 5mg /ativan 2mg po/im q6hrs prn for agitation  - nicotine replacement  - sliding scale for DM , metformin  500mg bid,  HTN:  Labetolol 200mg bid,  milliGRAM(s) Oral at bedtimebudesonide 160 MICROgram(s)/formoterol 4.5 MICROgram(s) Inhaler 2 Puff(s) Inhalation two times a day,  levothyroxine 50mcg qdaily, losartan 100mg qdaily, atorvastatin 40mg qdaily

## 2024-03-07 NOTE — ED ADULT TRIAGE NOTE - CHIEF COMPLAINT QUOTE
Patient presents to ED with complaints of hearing noises while at home. Patient reports hearing knocking on door x1 week. PMHX of bipolar disorder, HTN, DM2, . Patient suggest taking bottle of pills if noises continue. Patient go to be main, complains of having a headache and hypertensive in triage. Unsure of when he took his last dose of BP meds. Denies any CP/SOB at this time.

## 2024-03-07 NOTE — ED ADULT NURSE NOTE - NS PRO PASSIVE SMOKE EXP
Call placed to this patient, reached voicemail. Left message with request for return call.       Letter sent Unknown

## 2024-03-07 NOTE — ED PROVIDER NOTE - BIRTH SEX
Dr. Kati Martin-    Please advise. I called back and spoke with Juanita Jacobsen, who is calling in requesting C. Dif testing to be ordered. Last test negative as of 01-20-22. She states she has noticed she is now experiencing a foul odor in stool since Saturday, which was not previously noted (upon last testing). She is now tolerating foods and liquids after [address] [place] [address] ED discharge (01-27-22). Juanita Jacobsen also notes irritation around ostomy site, which she describes as, \"pus/lwhitish- yellow. \" She noticed this change as of this morning because \"it popped off. \" Which she states occurs every once in awhile. Inquired if her home health nurse comes today, which Juanita Jacobsen states she does, but that she does not do any ostomy site care, and that she is mostly there to attend to the wounds/pressure ulcers. She will be coming to [address] [place] [address] for speech therapy today at 12:30 PM, so if C Dif testing should be repeated, she would like to do so after this appointment. Male

## 2024-03-07 NOTE — ED ADULT NURSE NOTE - OBJECTIVE STATEMENT
Patient presented to ED with a chief complaint of auditory and visual hallucinations. Patient states that he was hearing knocking at the door and when he went to answer there was no one there. States that he was seeing people walking around the backyard. Patient states that the knocking was so loud that he wanted to hurt himself. States that he is having suicidal ideation with a plan to take a bottle of pills. Patient states he has a headache which is now resolved. Otherwise no other complaints or c/o pain. Patients belongings were confiscated and valuables were brought to security, 1:1 initiated, safety maintained, patient contains no contraband.  Patient is A/O x 4, NAD, skin intact, PERRLA, speech clear, neurologically intact with no deficits, strength b/l upper and lower extremities 5/5, sensation intact b/l upper and lower extremities, rate and rhythm regular S1 and S2 heard with no murmurs radial and pedal pulses present, capillary refill <3 , lungs clear b/l lobes throughout with no adventitious sounds or accessory muscle use, even and unlabored, abdomen soft and non-tender, bowel sounds heard x 4 quadrant, no edema noted. Safety maintained, bed in lowest position, call bell within reach, plan of care reviewed with patient , addressed all questions and concern, will continue to monitor patient.      Patient belongings placed in cabinet  Valuables sent to security- 1 pack of [employer] Cigarettes, 1 Cell phone, 1 set of (3) keys on lanyard, License, Medicaid card, laundry card, 1 lighter (4) $20 (1) $5 (6) $1, wallet

## 2024-03-07 NOTE — ED PROVIDER NOTE - PHYSICAL EXAMINATION
Const: Awake, alert, no acute distress.  Well appearing.  Moving comfortably on stretcher.  HEENT: NC/AT.  Moist mucous membranes.  No pharyngeal erythema, no exudates.  Eyes: Extraocular movements intact b/l.  Pupils equal, round, and reactive to light b/l.  Conjunctiva pink.  No scleral icterus.  Neck: Neck supple, full ROM without pain.  Cardiac: Regular rate and regular rhythm. S1 S2 present.  Peripheral pulses 2+ and symmetric.  No LE edema.  No chest wall tenderness, no overlying skin changes.  Resp: Speaking in full sentences. No evidence of respiratory distress.  Good air entry b/l, breath sounds clear to auscultation b/l.  Abd: Non-distended, no overlying skin changes.  Soft, non-tender, no guarding, no rigidity, no rebound tenderness.  No palpable masses.  Normal bowel sounds in all 4 quadrants.  MSK: Spine midline and non-tender, no paraspinal tenderness, no palpable deformities or overlying skin changes or open wounds. No CVAT.  Skin: Normal coloration.  No rashes, abrasions or lacerations.  Neuro: Awake, alert & oriented x 3.  Moves all extremities spontaneously and symmetrically.  No focal deficits.  Psych: pt pleasant, active SI, reports HI wanting to "stab" his neighbors for making too much noise.

## 2024-03-07 NOTE — ED BEHAVIORAL HEALTH NOTE - BEHAVIORAL HEALTH NOTE
Brittany Arias - [phone]    Spoke with her brother 4-5 days ago. Last they spoke he had a cold and was given antibiotics.   In terms of mental health - in the last week everything was good.   Patient receives care at [facility] with Dr. Cook.  He's been in and out of the hospital a few times.   He was in the hospital 2 months ago.   In the last year he's been hospitalized a few times for a month at a time and because of his medical issues he was kept for long term antibiotics.   He receives monthly infusions - common variable immune deficiency    IVIG monthly, gamma-guard SD (maybe got it in the last week)    Mental Health:  No concerns for his mental health. He had not reported he was hearing voices. No suicidal thoughts. Last time he had suicidal thoughts was a couple months ago. Very remote hx of suicide attempt.   Lives alone, cares for himself and his house.     Sister has no concerns for patient's safety but if patient is coming to the hospital seeking help, there should be some concerns.   First week - gets his food stamps.   Second week receives his check and if he spends his money he may start begging for money on the street.     Does not engage in drug use, smokes a lot of cigarettes.

## 2024-03-07 NOTE — ED BEHAVIORAL HEALTH ASSESSMENT NOTE - NSBHMSERECMEM_PSY_A_CORE
VSS in isolette. NPASS less than 3. No a/b spells. Remains off respiratory support. Intermittent tachypnea and self-resolved desats into the upper 80's. Started fortifying feeds, receiving 18 ml ebm/dm with SHMF 24 ollie over 45 minutes. Emesis x1. Voiding and stooling. Weight down 10 grams. No contact with parents this shift.   Normal

## 2024-03-07 NOTE — ED PROVIDER NOTE - OBJECTIVE STATEMENT
58-year-old M patient with history of bipolar disorder, schizophrenia, on Abilify Depo injections, DM, HTN, HLD, presenting for evaluation of worsening auditory hallucinations today.  Patient states at baseline he hears muffled voices daily, however today he heard a loud banging on the door, every time he checked the door no one was there.  Patient states this pain noises very frustrating, "I cannot take it anymore "stating he had thoughts of wanting to end his life by taking all of his medications at home.  Patient also notes that his neighbors are often doing yard work and making loud noises, states he had thoughts of wanting to hurt them, "stabbed them "however does not think he will ever act on this, has never been violent previously.  Today he states he had mild generalized headache, which is currently resolved.  pt smokes cigarette daily, no etoh intake, no drug usage.  Denies fevers, chills, chest pain, shortness of breath, nausea, vomiting, diarrhea, abdominal pain, LOC, numbness, tingling.

## 2024-03-07 NOTE — ED PROVIDER NOTE - PROGRESS NOTE DETAILS
Trevor Beltran DO (PGY1)  Consulted psychiatry for patient with auditory hallucination and SA/HI. Negative tox screen with labs significant for hyponatremia of 130 treated with fluid resuscitation.  Psych to see patient, will appreciate recommendations. DD ED ATTG: rec'd s/o from Dr Giordano - 58M in main due to BP high, Bp improved spont..  h/o bipolar, schizophrenia, gets BELLA abilify; p/w active SI (+)HI.  (+)VH (+)AH.  Psych consulted, boarding for admission.  MC.  Will s/o to Dr Calvin Rausch

## 2024-03-07 NOTE — ED ADULT NURSE REASSESSMENT NOTE - NS ED NURSE REASSESS COMMENT FT1
report received from day nurse , patient laying in semi fowlers position on the stretcher. patient alert and oriented times four. patient denies shortness of breath, chest pain, nausea, vomiting, chill, fever. Patient normal sinus on the monitor. Respirations equal and adequate. Patients IV patent, no signs of infiltration. Safety measures in place, call bell within reach. Patient stable upon assessment. C.O in chart. Safety measures in place. report received from day nurse , patient laying in semi fowlers position on the stretcher. patient alert and oriented times four. patient denies shortness of breath, chest pain, nausea, vomiting, chill, fever. Patient normal sinus on the monitor. Respirations equal and adequate. Patients IV patent, no signs of infiltration. Safety measures in place, call bell within reach. Patient stable upon assessment. C.O in chart. Safety measures in place. Pts valuables at security. Pts clothing across cat scan room.

## 2024-03-07 NOTE — ED ADULT NURSE NOTE - SUICIDE SCREENING DEPRESSION
Hypertension is improving with treatment.  Continue current treatment regimen.  Dietary sodium restriction.  Continue current medications.  Blood pressure will be reassessed in 4 weeks.   Positive

## 2024-03-07 NOTE — ED BEHAVIORAL HEALTH ASSESSMENT NOTE - DETAILS
self referred see HPI selena pt reports he will stab anyone who is wearing a police uniform as he believes they are trying to hurt him boarding pt reports he has hi to hurt people who are making a noise outside working but denies any specific person , denies plan or intent Dr. Pennington

## 2024-03-07 NOTE — ED BEHAVIORAL HEALTH ASSESSMENT NOTE - HPI (INCLUDE ILLNESS QUALITY, SEVERITY, DURATION, TIMING, CONTEXT, MODIFYING FACTORS, ASSOCIATED SIGNS AND SYMPTOMS)
58M, domiciled in supportive housing TSI, SSD, single, PMH HTN, DM, hypogammaglobulinemia, PPHx schizoaffective d/o, bipolar d/o, hx of multiple psych hospitalizations (last known in 2020 at [facility]), denies hx SA/NSSIB, pt in treatment at [facility] AOP[facility] with DR. Cook , and is on BELLA , denies drug or alcohol use, denies legal hx;   pt bib self for  ah , si/hi.     as per outpatient , em note on 12/27/23  :"Pt reports euthymic mood, denies recent AH (states when he was hospitalized in November for BP management and he was hearing voices at the time => c/l team started abilify 5mg with good effect; bp currently stable). Denies SI, HI, PI." Patient received Abilify Maintana 400mg IM on 2/14/24 and is scheduled on 3/15 for the next injection.     Patient on assessment is anxious,  Patient  denies any access to guns. Pt denies vh. He reports he is very anxious but not depressed, he has been sleeping and eating ok. He denies any manic sx , inclduing grandiosity, elevated mood , decrease need for sleep.  Patient has been complaint with injection clinic appointments . 58M, domiciled in supportive housing TSI, SSD, single, PMH HTN, DM, hypogammaglobulinemia, PPHx schizoaffective d/o, bipolar d/o, hx of multiple psych hospitalizations (last known in 2020 at [facility]), denies hx SA/NSSIB, pt in treatment at [facility] AOP[facility] with DR. Cook , and is on BELLA , denies drug or alcohol use, denies legal hx;   pt bib self for  ah , si/hi.     as per outpatient , em note on 12/27/23  :"Pt reports euthymic mood, denies recent AH (states when he was hospitalized in November for BP management and he was hearing voices at the time => c/l team started abilify 5mg with good effect; bp currently stable). Denies SI, HI, PI." Patient received Abilify Maintana 400mg IM on 2/14/24 and is scheduled on 3/15 for the next injection .     Patient was initially taken to main ED as his BP was elevated 203/104 upon arrival, but has improved since and he was given IV fluids due to  , without any altered mentation , no further medical intervention , BMP will be repeated. Pt on assessment,  is calm, cooperative , endorsing feeling "bad , I don't want to live anymore". Pt states he has been hearing noises of banging and hearing voices but can't make out what the  voices are saying . He reports the sounds and the voices have gotten worse causing him feel hopeless and having si . Patient says he has thought of taking his pills as an overdose but has not taken any steps towards the plan and denies intention of hurting himself. Patient also reports he has been having +hi , when he hears people outside working and making noise, but denies any specific plan or intent and denies anyone in specific.   Patient  denies any access to guns. Pt denies vh, but feels paranoid at night feels someone is breaking into his house.  He  reports  feeling  depressed, he has been having low energy , less sleep, +anhedonia .  He denies any manic sx , including grandiosity, elevated mood , decrease need for sleep.  Patient has been complaint with injection clinic appointments .

## 2024-03-07 NOTE — ED BEHAVIORAL HEALTH ASSESSMENT NOTE - VIOLENCE RISK FACTORS:
Feeling of being under threat and being unable to control threat/Affective dysregulation/Irritability Feeling of being under threat and being unable to control threat/Violent ideation/threat/speech

## 2024-03-07 NOTE — ED PROVIDER NOTE - ATTENDING CONTRIBUTION TO CARE
58-year-old male with known past medical history of bipolar disorder, schizophrenia, on Abilify injections, diabetes, hypertension, hyperlipidemia presents to ED for evaluation of auditory hallucinations possible visual hallucinations, worsening suicidal ideations and intermittent homicidal ideations.  Patient with plan for overdose.  Denies any attempts today. PE: Well appearing, RRR, CTA BL lungs, abd soft NTND. A/P Labs, imaging, medicate, consult psych

## 2024-03-07 NOTE — ED BEHAVIORAL HEALTH ASSESSMENT NOTE - DESCRIPTION
Patient is   Vital Signs Last 24 Hrs  T(C): 37.1 (07 Mar 2024 19:12), Max: 37.1 (07 Mar 2024 19:12)  T(F): 98.8 (07 Mar 2024 19:12), Max: 98.8 (07 Mar 2024 19:12)  HR: 74 (07 Mar 2024 19:12) (74 - 83)  BP: 144/82 (07 Mar 2024 19:12) (144/82 - 203/104)  BP(mean): --  RR: 19 (07 Mar 2024 19:12) (18 - 19)  SpO2: 95% (07 Mar 2024 19:12) (95% - 97%)    Parameters below as of 07 Mar 2024 19:12  Patient On (Oxygen Delivery Method): room air per pt: HTN, common variable immunodeficiency (reported to get monthly IVIG infusion - next due in 3.5 week) lives in supportive housing, on disability, close to sisters Susannah and Brittany ([phone]) Patient is calm, cooperative , has not required any prn medications.  Vital Signs Last 24 Hrs  T(C): 37.1 (07 Mar 2024 19:12), Max: 37.1 (07 Mar 2024 19:12)  T(F): 98.8 (07 Mar 2024 19:12), Max: 98.8 (07 Mar 2024 19:12)  HR: 74 (07 Mar 2024 19:12) (74 - 83)  BP: 144/82 (07 Mar 2024 19:12) (144/82 - 203/104)  BP(mean): --  RR: 19 (07 Mar 2024 19:12) (18 - 19)  SpO2: 95% (07 Mar 2024 19:12) (95% - 97%)    Parameters below as of 07 Mar 2024 19:12  Patient On (Oxygen Delivery Method): room air

## 2024-03-07 NOTE — ED BEHAVIORAL HEALTH ASSESSMENT NOTE - RISK ASSESSMENT
The patient is at chronically elevated risk of self harm and suicide. Risk factors include schizoaffective disorder, hx multiple hospitalizations, hx SI. Protective factors include stable housing, supportive family, engagement in treatment. Overall, the patient is at imminent risk for harm to others due to +hi in the setting of paranoia, requiring hospitalization for safety. The patient is at chronically elevated risk of self harm and suicide. Risk factors include schizoaffective disorder, hx multiple hospitalizations, hx SI. Protective factors include stable housing, supportive family, engagement in treatment. Overall, the patient is at imminent risk for harm to self and to others due to  +si/+hi , requiring hospitalization for safety.

## 2024-03-07 NOTE — ED ADULT NURSE NOTE - NSFALLASSESSNEED_ED_ALL_ED
[Fatigue] : fatigue [Recent Change In Weight] : ~T recent weight change [Lower Ext Edema] : lower extremity edema [Negative] : Heme/Lymph [Dysphagia] : no dysphagia [Diarrhea] : no diarrhea [FreeTextEntry4] : Left ear ache, difficulty swallowing  no

## 2024-03-07 NOTE — ED BEHAVIORAL HEALTH ASSESSMENT NOTE - CURRENT MEDICATION
Abilify Maintena 300mg [id number], last injection on 11/16/23, next one on 12/14/23  atorvastatin 40 milliGRAM(s) Oral at bedtime  budesonide 160 MICROgram(s)/formoterol 4.5 MICROgram(s) Inhaler 2 Puff(s) Inhalation two times a day  cholecalciferol 2000 Unit(s) Oral daily  Labetolol unknown dose and metformin unknown dose Abilify Maintena 400mg [id number], last injection 2/14/24, next one on 3/15/24    budesonide 160 MICROgram(s)/formoterol 4.5 MICROgram(s) Inhaler 2 Puff(s) Inhalation two times a day  cholecalciferol 2000 Unit(s) Oral daily,metformin 500mg bid, labetalol 200mg bid, levothyroxine 50mcg qdaily, losartan 100mg qdaily, atorvastatin 40mg qdaily

## 2024-03-07 NOTE — ED PROVIDER NOTE - CLINICAL SUMMARY MEDICAL DECISION MAKING FREE TEXT BOX
this is a 57y/o gentleman with hx bipolar and schizophrenia, DM/HTN/HLD, presenting for evaluation of worsening auditory hallucinations, also with active SI today wanting to take pills, HI endorsing wanting to "stab" his neighbors for making too much noise, had a generalized HA today that is now resolved, normal examination, will check basic bloodwork and urine and CXR as screening for psychiatric evaluation, follow up and reassess.

## 2024-03-08 DIAGNOSIS — F25.9 SCHIZOAFFECTIVE DISORDER, UNSPECIFIED: ICD-10-CM

## 2024-03-08 DIAGNOSIS — D83.9 COMMON VARIABLE IMMUNODEFICIENCY, UNSPECIFIED: ICD-10-CM

## 2024-03-08 DIAGNOSIS — E87.1 HYPO-OSMOLALITY AND HYPONATREMIA: ICD-10-CM

## 2024-03-08 DIAGNOSIS — I10 ESSENTIAL (PRIMARY) HYPERTENSION: ICD-10-CM

## 2024-03-08 DIAGNOSIS — E11.9 TYPE 2 DIABETES MELLITUS WITHOUT COMPLICATIONS: ICD-10-CM

## 2024-03-08 DIAGNOSIS — E78.5 HYPERLIPIDEMIA, UNSPECIFIED: ICD-10-CM

## 2024-03-08 LAB
GLUCOSE BLDC GLUCOMTR-MCNC: 123 MG/DL — HIGH (ref 70–99)
GLUCOSE BLDC GLUCOMTR-MCNC: 94 MG/DL — SIGNIFICANT CHANGE UP (ref 70–99)
GLUCOSE BLDC GLUCOMTR-MCNC: 94 MG/DL — SIGNIFICANT CHANGE UP (ref 70–99)

## 2024-03-08 PROCEDURE — 99222 1ST HOSP IP/OBS MODERATE 55: CPT | Mod: FS

## 2024-03-08 PROCEDURE — 99212 OFFICE O/P EST SF 10 MIN: CPT

## 2024-03-08 RX ORDER — ACETAMINOPHEN 500 MG
650 TABLET ORAL EVERY 6 HOURS
Refills: 0 | Status: DISCONTINUED | OUTPATIENT
Start: 2024-03-08 | End: 2024-03-15

## 2024-03-08 RX ORDER — ARIPIPRAZOLE 15 MG/1
400 TABLET ORAL ONCE
Refills: 0 | Status: COMPLETED | OUTPATIENT
Start: 2024-03-09 | End: 2024-03-09

## 2024-03-08 RX ORDER — ARIPIPRAZOLE 15 MG/1
5 TABLET ORAL DAILY
Refills: 0 | Status: DISCONTINUED | OUTPATIENT
Start: 2024-03-08 | End: 2024-03-08

## 2024-03-08 RX ORDER — POLYETHYLENE GLYCOL 3350 17 G/17G
17 POWDER, FOR SOLUTION ORAL DAILY
Refills: 0 | Status: DISCONTINUED | OUTPATIENT
Start: 2024-03-08 | End: 2024-03-15

## 2024-03-08 RX ORDER — LACTULOSE 10 G/15ML
10 SOLUTION ORAL DAILY
Refills: 0 | Status: DISCONTINUED | OUTPATIENT
Start: 2024-03-08 | End: 2024-03-15

## 2024-03-08 RX ORDER — SENNA PLUS 8.6 MG/1
2 TABLET ORAL AT BEDTIME
Refills: 0 | Status: DISCONTINUED | OUTPATIENT
Start: 2024-03-08 | End: 2024-03-15

## 2024-03-08 RX ADMIN — SENNA PLUS 2 TABLET(S): 8.6 TABLET ORAL at 20:30

## 2024-03-08 RX ADMIN — LOSARTAN POTASSIUM 100 MILLIGRAM(S): 100 TABLET, FILM COATED ORAL at 06:13

## 2024-03-08 RX ADMIN — ATORVASTATIN CALCIUM 40 MILLIGRAM(S): 80 TABLET, FILM COATED ORAL at 20:30

## 2024-03-08 RX ADMIN — Medication 200 MILLIGRAM(S): at 06:10

## 2024-03-08 RX ADMIN — Medication 200 MILLIGRAM(S): at 20:31

## 2024-03-08 RX ADMIN — METFORMIN HYDROCHLORIDE 500 MILLIGRAM(S): 850 TABLET ORAL at 20:30

## 2024-03-08 NOTE — ED ADULT NURSE REASSESSMENT NOTE - NS ED NURSE REASSESS COMMENT FT1
Received report from PM RN. Patient is alert and oriented. Patient requested and received medications as ordered. Patient has a bed assignment. Report given to RN. Patient offered and accepted meals. VS stable. Waiting for pickup. Calm and cooperative.  GARCIA Lemon

## 2024-03-08 NOTE — BH PATIENT PROFILE - HOME MEDICATIONS
amLODIPine 10 mg oral tablet , 1 tab(s) orally once a day  ARIPiprazole 5 mg oral tablet , 1 tab(s) orally once a day  irbesartan 300 mg oral tablet , 1 tab(s) orally once a day  labetalol 200 mg oral tablet , 1 tab(s) orally 2 times a day  Abilify Maintena Prefilled Syringe 300 mg intramuscular injection, extended release , 300 milligram(s) intramuscularly once a month  metFORMIN 500 mg oral tablet , 1 tab(s) orally 2 times a day  levothyroxine 50 mcg (0.05 mg) oral tablet , 1 tab(s) orally once a day  Trelegy Ellipta 100 mcg-62.5 mcg-25 mcg/inh inhalation powder , 1 puff(s) inhaled once a day  rosuvastatin 10 mg oral tablet , 1 tab(s) orally once a day

## 2024-03-08 NOTE — BH INPATIENT PSYCHIATRY ASSESSMENT NOTE - NSBHMSEAFFCONG_PSY_A_CORE
suicidality suicidality suicidality suicidality/depression suicidality Congruent suicidality/depression

## 2024-03-08 NOTE — ED BEHAVIORAL HEALTH PROGRESS NOTE - RISK ASSESSMENT
The patient is at chronically elevated risk of self harm and suicide. Risk factors include schizoaffective disorder, hx multiple hospitalizations, hx SI. Protective factors include stable housing, supportive family, engagement in treatment. Overall, the patient is at imminent risk for harm to self and to others due to  +si/+hi , requiring hospitalization for safety.

## 2024-03-08 NOTE — ED BEHAVIORAL HEALTH PROGRESS NOTE - NSBHMSEJUDGE_PSY_A_CORE
I have personally evaluated and examined the patient. The Attending was available to me as a supervising provider if needed.
Fair

## 2024-03-08 NOTE — BH INPATIENT PSYCHIATRY ASSESSMENT NOTE - CURRENT MEDICATION
MEDICATIONS  (STANDING):  ARIPiprazole 5 milliGRAM(s) Oral daily  atorvastatin 40 milliGRAM(s) Oral at bedtime  dextrose 5%. 1000 milliLiter(s) (50 mL/Hr) IV Continuous <Continuous>  dextrose 5%. 1000 milliLiter(s) (100 mL/Hr) IV Continuous <Continuous>  dextrose 50% Injectable 25 Gram(s) IV Push once  dextrose 50% Injectable 12.5 Gram(s) IV Push once  dextrose 50% Injectable 25 Gram(s) IV Push once  glucagon  Injectable 1 milliGRAM(s) IntraMuscular once  insulin lispro (ADMELOG) corrective regimen sliding scale   SubCutaneous three times a day before meals  labetalol 200 milliGRAM(s) Oral every 12 hours  levothyroxine 50 MICROGram(s) Oral daily  losartan 100 milliGRAM(s) Oral daily  metFORMIN 500 milliGRAM(s) Oral two times a day    MEDICATIONS  (PRN):  dextrose Oral Gel 15 Gram(s) Oral once PRN Blood Glucose LESS THAN 70 milliGRAM(s)/deciliter  haloperidol     Tablet 5 milliGRAM(s) Oral every 6 hours PRN agitation  haloperidol    Injectable 5 milliGRAM(s) IntraMuscular once PRN Agitation  LORazepam     Tablet 2 milliGRAM(s) Oral every 6 hours PRN Agitation  LORazepam   Injectable 2 milliGRAM(s) IntraMuscular Once PRN Agitation  nicotine  Polacrilex Gum 2 milliGRAM(s) Oral every 2 hours PRN breakthrough cravings   MEDICATIONS  (STANDING):  atorvastatin 40 milliGRAM(s) Oral at bedtime  dextrose 5%. 1000 milliLiter(s) (50 mL/Hr) IV Continuous <Continuous>  dextrose 5%. 1000 milliLiter(s) (100 mL/Hr) IV Continuous <Continuous>  dextrose 50% Injectable 25 Gram(s) IV Push once  dextrose 50% Injectable 25 Gram(s) IV Push once  dextrose 50% Injectable 12.5 Gram(s) IV Push once  glucagon  Injectable 1 milliGRAM(s) IntraMuscular once  insulin lispro (ADMELOG) corrective regimen sliding scale   SubCutaneous three times a day before meals  labetalol 200 milliGRAM(s) Oral every 12 hours  levothyroxine 50 MICROGram(s) Oral daily  losartan 100 milliGRAM(s) Oral daily  melatonin. 5 milliGRAM(s) Oral at bedtime  metFORMIN 500 milliGRAM(s) Oral two times a day  polyethylene glycol 3350 17 Gram(s) Oral daily  senna 2 Tablet(s) Oral at bedtime    MEDICATIONS  (PRN):  acetaminophen     Tablet .. 650 milliGRAM(s) Oral every 6 hours PRN Temp greater or equal to 38C (100.4F), Mild Pain (1 - 3), Moderate Pain (4 - 6), Severe Pain (7 - 10)  dextrose Oral Gel 15 Gram(s) Oral once PRN Blood Glucose LESS THAN 70 milliGRAM(s)/deciliter  haloperidol     Tablet 5 milliGRAM(s) Oral every 6 hours PRN agitation  haloperidol    Injectable 5 milliGRAM(s) IntraMuscular once PRN Agitation  lactulose Syrup 10 Gram(s) Oral daily PRN constipation  LORazepam     Tablet 2 milliGRAM(s) Oral every 6 hours PRN Agitation  LORazepam   Injectable 2 milliGRAM(s) IntraMuscular Once PRN Agitation  nicotine  Polacrilex Gum 2 milliGRAM(s) Oral every 2 hours PRN breakthrough cravings   MEDICATIONS  (STANDING):  atorvastatin 40 milliGRAM(s) Oral at bedtime  dextrose 5%. 1000 milliLiter(s) (50 mL/Hr) IV Continuous <Continuous>  dextrose 5%. 1000 milliLiter(s) (100 mL/Hr) IV Continuous <Continuous>  dextrose 50% Injectable 12.5 Gram(s) IV Push once  dextrose 50% Injectable 25 Gram(s) IV Push once  dextrose 50% Injectable 25 Gram(s) IV Push once  glucagon  Injectable 1 milliGRAM(s) IntraMuscular once  insulin lispro (ADMELOG) corrective regimen sliding scale   SubCutaneous three times a day before meals  labetalol 200 milliGRAM(s) Oral every 12 hours  levothyroxine 50 MICROGram(s) Oral daily  losartan 100 milliGRAM(s) Oral daily  melatonin. 5 milliGRAM(s) Oral at bedtime  metFORMIN 500 milliGRAM(s) Oral two times a day  polyethylene glycol 3350 17 Gram(s) Oral daily  senna 2 Tablet(s) Oral at bedtime    MEDICATIONS  (PRN):  acetaminophen     Tablet .. 650 milliGRAM(s) Oral every 6 hours PRN Temp greater or equal to 38C (100.4F), Mild Pain (1 - 3), Moderate Pain (4 - 6), Severe Pain (7 - 10)  dextrose Oral Gel 15 Gram(s) Oral once PRN Blood Glucose LESS THAN 70 milliGRAM(s)/deciliter  haloperidol     Tablet 5 milliGRAM(s) Oral every 6 hours PRN agitation  haloperidol    Injectable 5 milliGRAM(s) IntraMuscular once PRN Agitation  lactulose Syrup 10 Gram(s) Oral daily PRN constipation  LORazepam     Tablet 2 milliGRAM(s) Oral every 6 hours PRN Agitation  LORazepam   Injectable 2 milliGRAM(s) IntraMuscular Once PRN Agitation  nicotine  Polacrilex Gum 2 milliGRAM(s) Oral every 2 hours PRN breakthrough cravings

## 2024-03-08 NOTE — BH INPATIENT PSYCHIATRY ASSESSMENT NOTE - DETAILS
pt reports he has hi to hurt people who are making a noise outside working but denies any specific person , denies plan or intent see HPI

## 2024-03-08 NOTE — ED ADULT NURSE REASSESSMENT NOTE - NS ED NURSE REASSESS COMMENT FT1
RN Rounding Note 4:30am- Pt observed sleeping, respirations even and non labored. Pt awaiting inpatient psychiatric bed when available.

## 2024-03-08 NOTE — ED BEHAVIORAL HEALTH PROGRESS NOTE - SUMMARY
58M, domiciled in supportive housing TSI, SSD, single, PMH HTN, DM, hypogammaglobulinemia, PPHx schizoaffective d/o, bipolar d/o, hx of multiple psych hospitalizations (last known in 2020 at [facility]), denies hx SA/NSSIB, pt in treatment at [facility] AOP[facility] with DR. Cook , and is on BELLA , denies drug or alcohol use, denies legal hx;   pt bib self for  ah , si/hi.     Patient is presenting with decompensated psychosis , with worsening of  +ah, in spite of treatment compliance . Patient is endorsing  +si/+hi with no concrete plan or intent . Patient at this time warrants psychiatric admission for safety and stabilization and amendable to voluntary admission.       Plan:  - Routine observation, no psychiatric indication for CO 1:1, feels safe in the hospital , not aggressive  - contiue Abilify 400mg Maintena q4 weeks, next dose is due on 3/15, will start Abilify 5mg po for psychosis   - Haldol 5mg /ativan 2mg po/im q6hrs prn for agitation  - nicotine replacement  - sliding scale for DM , metformin  500mg bid,  HTN:  Labetolol 200mg bid,  milliGRAM(s) Oral at bedtimebudesonide 160 MICROgram(s)/formoterol 4.5 MICROgram(s) Inhaler 2 Puff(s) Inhalation two times a day,  levothyroxine 50mcg qdaily, losartan 100mg qdaily, atorvastatin 40mg qdaily     Addendum: pt is found to have Na 124 , will be medically admitted  pt will need 1:1 while on the medical floor, unpredictable due to psychosis  medical issues : as per medical team  contiue Abilify 300mg Maintena q4 weeks, next dose is due on 12/14, last dose on 11/16  - Haldol 5mg /ativan 2mg po/im q6hrs prn for agitation, CHECK ECG FOR QTC . IF qtc >500msec hold haldol   - nicotine replacement

## 2024-03-08 NOTE — BH INPATIENT PSYCHIATRY ASSESSMENT NOTE - NSICDXBHSECONDARYDX_PSY_ALL_CORE
HTN (hypertension)   I10  DM (diabetes mellitus)   E11.9  Common variable immunodeficiency   D83.9

## 2024-03-08 NOTE — BH INPATIENT PSYCHIATRY ASSESSMENT NOTE - OTHER PAST PSYCHIATRIC HISTORY (INCLUDE DETAILS REGARDING ONSET, COURSE OF ILLNESS, INPATIENT/OUTPATIENT TREATMENT)
reported hx of schizoaffective d/o, bipolar disorder with no past SA/SIB, hx of multiple psych hospitalizations (last known in [place] 10/2020 - 11/2020), [facility]

## 2024-03-08 NOTE — BH INPATIENT PSYCHIATRY ASSESSMENT NOTE - ATTENDING COMMENTS
58M, domiciled in supportive housing PMH HTN, DM, hypogammaglobulinemia, PPHx schizoaffective d/o, bipolar d/o, hx of multiple psych hospitalizations (last known in 2020 at [facility]), denies hx SA/NSSIB, pt in treatment at [facility] BIB self for depressive and psychotic symptoms. On exam, with increased depressed mood, +AH, paranoia, wtih vague passive SI/HI without plan or intent. Pt future oriented, motivated for treatment, agreeable for BELLA.      58M, domiciled in supportive housing PMH HTN, DM, hypogammaglobulinemia, PPHx schizoaffective d/o, bipolar d/o, hx of multiple psych hospitalizations (last known in 2020 at [facility]), denies hx SA/NSSIB, pt in treatment at [facility] BIB self for depressive and psychotic symptoms. On exam, with increased depressed mood, +AH, paranoia, wtih vague passive SI/HI without plan or intent. Pt future oriented, motivated for treatment, agreeable for BELLA.   Pt with hyponatremia upon transfer from medicine, will fluid restrict over weekend. Consult with medicine. Trend sodium.

## 2024-03-08 NOTE — BH INPATIENT PSYCHIATRY ASSESSMENT NOTE - HPI (INCLUDE ILLNESS QUALITY, SEVERITY, DURATION, TIMING, CONTEXT, MODIFYING FACTORS, ASSOCIATED SIGNS AND SYMPTOMS)
58M, domiciled in supportive housing TSI, SSD, single, PMH HTN, DM, hypogammaglobulinemia, PPHx schizoaffective d/o, bipolar d/o, hx of multiple psych hospitalizations (last known in 2020 at [facility]), denies hx SA/NSSIB, pt in treatment at [facility] AOP[facility] with DR. Cook , and is on BELLA , denies drug or alcohol use, denies legal hx;   pt bib self for  ah , si/hi.     on assessment on ML4: pt seen resting in bed, endorsing wanting to be left alone. When asked who was bothering him, pt states that he hears "mumbling" at his resident, unable to make out what these voices are saying, denying +CAH at this time. When asked about VH, pt states "I don't like [place] people." Pt reports paranoia regarding the [place] mob, stating that they "don't want me saying anything" and that "they are going to kill us" so that we "don't give them a bad rap." When asked if pt felt safe on the unit, pt stated yes but that "the mob doesn't want me to talk to anyone" and stated his preference to not speak further to NP regarding his current situation. Pt denied SI/HI at time of assessment and encouraged to come to staff with any concerns. Denies symptoms of hyponatremai or severe hypertension. Will attempt more complete assessment later. Pt informed of plan to administer abilify maintana early, with pt voicing agreement to plan.     as per outpatient , em note on 12/27/23  :"Pt reports euthymic mood, denies recent AH (states when he was hospitalized in November for BP management and he was hearing voices at the time => c/l team started abilify 5mg with good effect; bp currently stable). Denies SI, HI, PI." Patient received Abilify Maintana 400mg IM on 2/14/24 and is scheduled on 3/15 for the next injection .     Patient was initially taken to main ED as his BP was elevated 203/104 upon arrival, but has improved since and he was given IV fluids due to  , without any altered mentation , no further medical intervention , BMP will be repeated. Pt on assessment,  is calm, cooperative , endorsing feeling "bad , I don't want to live anymore". Pt states he has been hearing noises of banging and hearing voices but can't make out what the  voices are saying . He reports the sounds and the voices have gotten worse causing him feel hopeless and having si . Patient says he has thought of taking his pills as an overdose but has not taken any steps towards the plan and denies intention of hurting himself. Patient also reports he has been having +hi , when he hears people outside working and making noise, but denies any specific plan or intent and denies anyone in specific.   Patient  denies any access to guns. Pt denies vh, but feels paranoid at night feels someone is breaking into his house.  He  reports  feeling  depressed, he has been having low energy , less sleep, +anhedonia .  He denies any manic sx , including grandiosity, elevated mood , decrease need for sleep.  Patient has been complaint with injection clinic appointments .

## 2024-03-08 NOTE — ED ADULT NURSE REASSESSMENT NOTE - NS ED NURSE REASSESS COMMENT FT1
report given to Daljit ZELAYA. IV taken out and patient taken to . patient alert and oriented times four. patient denies shortness of breath, chest pain, nausea, vomiting, chill, fever. Patient stable upon leaving the area. Patients clothing and shoes brought to . Patients valuables at security

## 2024-03-08 NOTE — BH PATIENT PROFILE - FALL HARM RISK - UNIVERSAL INTERVENTIONS
Bed in lowest position, wheels locked, appropriate side rails in place/Call bell, personal items and telephone in reach/Instruct patient to call for assistance before getting out of bed or chair/Non-slip footwear when patient is out of bed/[place] to call system/Physically safe environment - no spills, clutter or unnecessary equipment/Purposeful Proactive Rounding/Room/bathroom lighting operational, light cord in reach

## 2024-03-08 NOTE — ED BEHAVIORAL HEALTH PROGRESS NOTE - CASE SUMMARY/FORMULATION (CLEARLY DOCUMENT RATIONALE FOR DISPOSITION CHANGE)
58M, domiciled in supportive housing TSI, SSD, single, PMH HTN, DM, hypogammaglobulinemia, PPHx schizoaffective d/o, bipolar d/o, hx of multiple psych hospitalizations (last known in 2020 at [facility]), denies hx SA/NSSIB, pt in treatment at [facility] AOP[facility] with DR. Cook , and is on BELLA , denies drug or alcohol use, denies legal hx;   pt bib self for  ah , si/hi.     Patient is presenting with decompensated psychosis , with worsening of  +ah, in spite of treatment compliance . Patient is endorsing  +si/+hi with no concrete plan or intent . Patient at this time warrants psychiatric admission for safety and stabilization and amendable to voluntary admission.

## 2024-03-08 NOTE — BH INPATIENT PSYCHIATRY ASSESSMENT NOTE - NSBHCHARTREVIEWVS_PSY_A_CORE FT
Vital Signs Last 24 Hrs  T(C): 36.7 (03-08-24 @ 14:52), Max: 37.1 (03-07-24 @ 19:12)  T(F): 98.1 (03-08-24 @ 14:52), Max: 98.8 (03-07-24 @ 19:12)  HR: 79 (03-08-24 @ 12:46) (69 - 83)  BP: 157/82 (03-08-24 @ 12:46) (144/82 - 203/104)  BP(mean): --  RR: 17 (03-08-24 @ 14:52) (16 - 19)  SpO2: 98% (03-08-24 @ 14:52) (95% - 98%)    Orthostatic VS  03-08-24 @ 14:52  Lying BP: --/-- HR: --  Sitting BP: 179/85 HR: 72  Standing BP: 175/87 HR: 82  Site: --  Mode: --   Vital Signs Last 24 Hrs  T(C): 36.5 (03-11-24 @ 08:17), Max: 36.8 (03-10-24 @ 20:09)  T(F): 97.7 (03-11-24 @ 08:17), Max: 98.2 (03-10-24 @ 20:09)  HR: 59 (03-10-24 @ 23:18) (59 - 59)  BP: 141/77 (03-10-24 @ 23:18) (141/77 - 141/77)  BP(mean): --  RR: 16 (03-10-24 @ 20:09) (16 - 16)  SpO2: 99% (03-10-24 @ 23:18) (99% - 99%)    Orthostatic VS  03-11-24 @ 08:17  Lying BP: --/-- HR: --  Sitting BP: 159/86 HR: 68  Standing BP: 163/89 HR: 74  Site: --  Mode: --  Orthostatic VS  03-10-24 @ 20:09  Lying BP: --/-- HR: --  Sitting BP: 155/83 HR: 70  Standing BP: 157/84 HR: 73  Site: --  Mode: --  Orthostatic VS  03-10-24 @ 10:17  Lying BP: --/-- HR: --  Sitting BP: 139/78 HR: 70  Standing BP: 151/80 HR: 62  Site: --  Mode: --  Orthostatic VS  03-10-24 @ 08:33  Lying BP: --/-- HR: --  Sitting BP: 143/98 HR: 99  Standing BP: 152/87 HR: 84  Site: --  Mode: --

## 2024-03-08 NOTE — BH INPATIENT PSYCHIATRY ASSESSMENT NOTE - NSBHASSESSSUMMFT_PSY_ALL_CORE
58M, domiciled in supportive housing TSI, SSD, single, PMH HTN, DM, hypogammaglobulinemia, PPHx schizoaffective d/o, bipolar d/o, hx of multiple psych hospitalizations (last known in 2020 at [facility]), denies hx SA/NSSIB, pt in treatment at [facility] with DR. Cook , and is on BELLA , denies drug or alcohol use, denies legal hx;   pt bib self for  ah , si/hi.     on initial assessment, pt presents with psychotic symptoms of paranoia and AH, not command in nature, for an undisclosed period of time. Pt states that his paranoid symptoms have caused an increase in depression and isolation, stating that he wants everyone to "leave him alone." Discussed administering abilify maintana early due to increase in psychotic symptoms, with pt voicing agreement. Email sent to pt's psychiatrist for collateral.     Treatment Plan  1. admitted to unit, legal status 9.13  2. safety  - routine checks as pt denies SIIP/HIIP and is able to contract for safety  - haldol and ativan PO/IM PRN for agitation and anxiety  3. psychiatric  - abilify maintana 400mg BELLA (administer early 3/9 instead of 3/15)  - past trials:   4. medical  - DM: metformin 500mg BID with sliding scale  - hypothyroidism: levothyroxine 50mcg  - HTN: labetalol 200mg BID and losartan 100mg   - hyperlipidemia: atorvastatin 40mg   - common variable immunodeficiency   - mild hyponatremia: fluid restriction of 1L  5. encourage I/G/M therapy  6. dispo  - continue with [facility] Dr. Hollie Cook

## 2024-03-08 NOTE — BH INPATIENT PSYCHIATRY ASSESSMENT NOTE - NSBHMETABOLIC_PSY_ALL_CORE_FT
BMI: BMI (kg/m2): 33.4 (03-08-24 @ 14:52)  HbA1c: A1C with Estimated Average Glucose Result: 5.6 % (03-07-24 @ 22:03)    Glucose: POCT Blood Glucose.: 94 mg/dL (03-08-24 @ 12:32)    BP: 157/82 (03-08-24 @ 12:46) (144/82 - 203/104)Vital Signs Last 24 Hrs  T(C): 36.7 (03-08-24 @ 14:52), Max: 37.1 (03-07-24 @ 19:12)  T(F): 98.1 (03-08-24 @ 14:52), Max: 98.8 (03-07-24 @ 19:12)  HR: 79 (03-08-24 @ 12:46) (69 - 83)  BP: 157/82 (03-08-24 @ 12:46) (144/82 - 203/104)  BP(mean): --  RR: 17 (03-08-24 @ 14:52) (16 - 19)  SpO2: 98% (03-08-24 @ 14:52) (95% - 98%)    Orthostatic VS  03-08-24 @ 14:52  Lying BP: --/-- HR: --  Sitting BP: 179/85 HR: 72  Standing BP: 175/87 HR: 82  Site: --  Mode: --    Lipid Panel:  BMI: BMI (kg/m2): 33.4 (03-08-24 @ 14:52)  HbA1c: A1C with Estimated Average Glucose Result: 5.6 % (03-09-24 @ 09:43)    Glucose: POCT Blood Glucose.: 98 mg/dL (03-10-24 @ 20:07)    BP: 141/77 (03-10-24 @ 23:18) (141/77 - 157/82)Vital Signs Last 24 Hrs  T(C): 36.5 (03-11-24 @ 08:17), Max: 36.8 (03-10-24 @ 20:09)  T(F): 97.7 (03-11-24 @ 08:17), Max: 98.2 (03-10-24 @ 20:09)  HR: 59 (03-10-24 @ 23:18) (59 - 59)  BP: 141/77 (03-10-24 @ 23:18) (141/77 - 141/77)  BP(mean): --  RR: 16 (03-10-24 @ 20:09) (16 - 16)  SpO2: 99% (03-10-24 @ 23:18) (99% - 99%)    Orthostatic VS  03-11-24 @ 08:17  Lying BP: --/-- HR: --  Sitting BP: 159/86 HR: 68  Standing BP: 163/89 HR: 74  Site: --  Mode: --  Orthostatic VS  03-10-24 @ 20:09  Lying BP: --/-- HR: --  Sitting BP: 155/83 HR: 70  Standing BP: 157/84 HR: 73  Site: --  Mode: --  Orthostatic VS  03-10-24 @ 10:17  Lying BP: --/-- HR: --  Sitting BP: 139/78 HR: 70  Standing BP: 151/80 HR: 62  Site: --  Mode: --  Orthostatic VS  03-10-24 @ 08:33  Lying BP: --/-- HR: --  Sitting BP: 143/98 HR: 99  Standing BP: 152/87 HR: 84  Site: --  Mode: --    Lipid Panel: Date/Time: 03-09-24 @ 09:43  Cholesterol, Serum: 187  LDL Cholesterol Calculated: 123  HDL Cholesterol, Serum: 37  Total Cholesterol/HDL Ration Measurement: --  Triglycerides, Serum: 135

## 2024-03-09 LAB
A1C WITH ESTIMATED AVERAGE GLUCOSE RESULT: 5.6 % — SIGNIFICANT CHANGE UP (ref 4–5.6)
ANION GAP SERPL CALC-SCNC: 11 MMOL/L — SIGNIFICANT CHANGE UP (ref 7–14)
BUN SERPL-MCNC: 10 MG/DL — SIGNIFICANT CHANGE UP (ref 7–23)
CALCIUM SERPL-MCNC: 9.5 MG/DL — SIGNIFICANT CHANGE UP (ref 8.4–10.5)
CHLORIDE SERPL-SCNC: 96 MMOL/L — LOW (ref 98–107)
CHOLEST SERPL-MCNC: 187 MG/DL — SIGNIFICANT CHANGE UP
CO2 SERPL-SCNC: 25 MMOL/L — SIGNIFICANT CHANGE UP (ref 22–31)
COVID-19 SPIKE DOMAIN AB INTERP: POSITIVE
COVID-19 SPIKE DOMAIN ANTIBODY RESULT: >250 U/ML — HIGH
CREAT SERPL-MCNC: 0.78 MG/DL — SIGNIFICANT CHANGE UP (ref 0.5–1.3)
EGFR: 105 ML/MIN/1.73M2 — SIGNIFICANT CHANGE UP
ESTIMATED AVERAGE GLUCOSE: 114 — SIGNIFICANT CHANGE UP
GLUCOSE BLDC GLUCOMTR-MCNC: 115 MG/DL — HIGH (ref 70–99)
GLUCOSE BLDC GLUCOMTR-MCNC: 91 MG/DL — SIGNIFICANT CHANGE UP (ref 70–99)
GLUCOSE BLDC GLUCOMTR-MCNC: 93 MG/DL — SIGNIFICANT CHANGE UP (ref 70–99)
GLUCOSE BLDC GLUCOMTR-MCNC: 95 MG/DL — SIGNIFICANT CHANGE UP (ref 70–99)
GLUCOSE SERPL-MCNC: 89 MG/DL — SIGNIFICANT CHANGE UP (ref 70–99)
HDLC SERPL-MCNC: 37 MG/DL — LOW
LIPID PNL WITH DIRECT LDL SERPL: 123 MG/DL — HIGH
MAGNESIUM SERPL-MCNC: 2 MG/DL — SIGNIFICANT CHANGE UP (ref 1.6–2.6)
NON HDL CHOLESTEROL: 150 MG/DL — HIGH
PHOSPHATE SERPL-MCNC: 3.3 MG/DL — SIGNIFICANT CHANGE UP (ref 2.5–4.5)
POTASSIUM SERPL-MCNC: 4.5 MMOL/L — SIGNIFICANT CHANGE UP (ref 3.5–5.3)
POTASSIUM SERPL-SCNC: 4.5 MMOL/L — SIGNIFICANT CHANGE UP (ref 3.5–5.3)
SARS-COV-2 IGG+IGM SERPL QL IA: >250 U/ML — HIGH
SARS-COV-2 IGG+IGM SERPL QL IA: POSITIVE
SODIUM SERPL-SCNC: 132 MMOL/L — LOW (ref 135–145)
TRIGL SERPL-MCNC: 135 MG/DL — SIGNIFICANT CHANGE UP

## 2024-03-09 PROCEDURE — 99231 SBSQ HOSP IP/OBS SF/LOW 25: CPT

## 2024-03-09 RX ADMIN — ARIPIPRAZOLE 400 MILLIGRAM(S): 15 TABLET ORAL at 10:50

## 2024-03-09 RX ADMIN — METFORMIN HYDROCHLORIDE 500 MILLIGRAM(S): 850 TABLET ORAL at 09:32

## 2024-03-09 RX ADMIN — POLYETHYLENE GLYCOL 3350 17 GRAM(S): 17 POWDER, FOR SOLUTION ORAL at 09:31

## 2024-03-09 RX ADMIN — METFORMIN HYDROCHLORIDE 500 MILLIGRAM(S): 850 TABLET ORAL at 20:08

## 2024-03-09 RX ADMIN — Medication 200 MILLIGRAM(S): at 09:31

## 2024-03-09 RX ADMIN — ATORVASTATIN CALCIUM 40 MILLIGRAM(S): 80 TABLET, FILM COATED ORAL at 20:08

## 2024-03-09 RX ADMIN — Medication 50 MICROGRAM(S): at 05:30

## 2024-03-09 RX ADMIN — SENNA PLUS 2 TABLET(S): 8.6 TABLET ORAL at 20:08

## 2024-03-09 RX ADMIN — Medication 200 MILLIGRAM(S): at 20:08

## 2024-03-09 RX ADMIN — LOSARTAN POTASSIUM 100 MILLIGRAM(S): 100 TABLET, FILM COATED ORAL at 09:32

## 2024-03-09 NOTE — BH INPATIENT PSYCHIATRY PROGRESS NOTE - NSBHFUPINTERVALHXFT_PSY_A_CORE
Chart reviewed and case discussed with treatment team. No events reported overnight. Sleep was "broken" and appetite is fair.  Patient denies any current SI or AVH. Patient is compliant with medications, no adverse effects reported.

## 2024-03-09 NOTE — BH INPATIENT PSYCHIATRY PROGRESS NOTE - NSBHMETABOLIC_PSY_ALL_CORE_FT
BMI: BMI (kg/m2): 33.4 (03-08-24 @ 14:52)  HbA1c: A1C with Estimated Average Glucose Result: 5.6 % (03-09-24 @ 09:43)    Glucose: POCT Blood Glucose.: 93 mg/dL (03-09-24 @ 11:51)    BP: 157/82 (03-08-24 @ 12:46) (144/82 - 203/104)Vital Signs Last 24 Hrs  T(C): 36.8 (03-09-24 @ 07:45), Max: 36.8 (03-09-24 @ 07:45)  T(F): 98.3 (03-09-24 @ 07:45), Max: 98.3 (03-09-24 @ 07:45)  HR: --  BP: --  BP(mean): --  RR: --  SpO2: --    Orthostatic VS  03-09-24 @ 07:45  Lying BP: --/-- HR: --  Sitting BP: 147/87 HR: 75  Standing BP: 142/89 HR: 82  Site: --  Mode: electronic  Orthostatic VS  03-08-24 @ 20:01  Lying BP: --/-- HR: --  Sitting BP: 157/88 HR: 73  Standing BP: 167/85 HR: 79  Site: --  Mode: --  Orthostatic VS  03-08-24 @ 14:52  Lying BP: --/-- HR: --  Sitting BP: 179/85 HR: 72  Standing BP: 175/87 HR: 82  Site: --  Mode: --    Lipid Panel: Date/Time: 03-09-24 @ 09:43  Cholesterol, Serum: 187  LDL Cholesterol Calculated: 123  HDL Cholesterol, Serum: 37  Total Cholesterol/HDL Ration Measurement: --  Triglycerides, Serum: 135

## 2024-03-09 NOTE — BH INPATIENT PSYCHIATRY PROGRESS NOTE - NSBHCHARTREVIEWVS_PSY_A_CORE FT
Vital Signs Last 24 Hrs  T(C): 36.8 (03-09-24 @ 07:45), Max: 36.8 (03-09-24 @ 07:45)  T(F): 98.3 (03-09-24 @ 07:45), Max: 98.3 (03-09-24 @ 07:45)  HR: --  BP: --  BP(mean): --  RR: --  SpO2: --    Orthostatic VS  03-09-24 @ 07:45  Lying BP: --/-- HR: --  Sitting BP: 147/87 HR: 75  Standing BP: 142/89 HR: 82  Site: --  Mode: electronic  Orthostatic VS  03-08-24 @ 20:01  Lying BP: --/-- HR: --  Sitting BP: 157/88 HR: 73  Standing BP: 167/85 HR: 79  Site: --  Mode: --  Orthostatic VS  03-08-24 @ 14:52  Lying BP: --/-- HR: --  Sitting BP: 179/85 HR: 72  Standing BP: 175/87 HR: 82  Site: --  Mode: --

## 2024-03-09 NOTE — PSYCHIATRIC REHAB INITIAL EVALUATION - NSBHPRRECOMMEND_PSY_ALL_CORE
Writer met with patient in order to orient patient to unit and briefly introduce patient to psychiatric rehabilitation staff and department function. Pt was provided with a copy of unit schedule. Patient is currently admitted due to increased AH and paranoia, believing that the [place] mob is after him. Pt reports these hallucinations are causing him to feel increasingly suicidal and homicidal. Pt also endorses low energy , less sleep, and anhedonia. Upon arrival to [facility], patient is mostly isolative to room. Patient and writer established a collaborative psychiatric rehabilitation goal. Writer encouraged patient to attend psychiatric rehabilitation groups and engage in treatment. Psychiatric rehabilitation staff will engage patient daily.

## 2024-03-09 NOTE — PSYCHIATRIC REHAB INITIAL EVALUATION - NSBHALCSUBTREAT_PSY_ALL_CORE
Pt has been compliant with outpatient treatment and received BELLA on 02/2024/Outpatient clinic (specify)

## 2024-03-09 NOTE — BH INPATIENT PSYCHIATRY PROGRESS NOTE - CURRENT MEDICATION
MEDICATIONS  (STANDING):  atorvastatin 40 milliGRAM(s) Oral at bedtime  dextrose 5%. 1000 milliLiter(s) (50 mL/Hr) IV Continuous <Continuous>  dextrose 5%. 1000 milliLiter(s) (100 mL/Hr) IV Continuous <Continuous>  dextrose 50% Injectable 25 Gram(s) IV Push once  dextrose 50% Injectable 12.5 Gram(s) IV Push once  dextrose 50% Injectable 25 Gram(s) IV Push once  glucagon  Injectable 1 milliGRAM(s) IntraMuscular once  insulin lispro (ADMELOG) corrective regimen sliding scale   SubCutaneous three times a day before meals  labetalol 200 milliGRAM(s) Oral every 12 hours  levothyroxine 50 MICROGram(s) Oral daily  losartan 100 milliGRAM(s) Oral daily  metFORMIN 500 milliGRAM(s) Oral two times a day  polyethylene glycol 3350 17 Gram(s) Oral daily  senna 2 Tablet(s) Oral at bedtime    MEDICATIONS  (PRN):  acetaminophen     Tablet .. 650 milliGRAM(s) Oral every 6 hours PRN Temp greater or equal to 38C (100.4F), Mild Pain (1 - 3), Moderate Pain (4 - 6), Severe Pain (7 - 10)  dextrose Oral Gel 15 Gram(s) Oral once PRN Blood Glucose LESS THAN 70 milliGRAM(s)/deciliter  haloperidol     Tablet 5 milliGRAM(s) Oral every 6 hours PRN agitation  haloperidol    Injectable 5 milliGRAM(s) IntraMuscular once PRN Agitation  lactulose Syrup 10 Gram(s) Oral daily PRN constipation  LORazepam     Tablet 2 milliGRAM(s) Oral every 6 hours PRN Agitation  LORazepam   Injectable 2 milliGRAM(s) IntraMuscular Once PRN Agitation  nicotine  Polacrilex Gum 2 milliGRAM(s) Oral every 2 hours PRN breakthrough cravings

## 2024-03-10 LAB
GLUCOSE BLDC GLUCOMTR-MCNC: 101 MG/DL — HIGH (ref 70–99)
GLUCOSE BLDC GLUCOMTR-MCNC: 110 MG/DL — HIGH (ref 70–99)
GLUCOSE BLDC GLUCOMTR-MCNC: 95 MG/DL — SIGNIFICANT CHANGE UP (ref 70–99)
GLUCOSE BLDC GLUCOMTR-MCNC: 98 MG/DL — SIGNIFICANT CHANGE UP (ref 70–99)

## 2024-03-10 PROCEDURE — 99231 SBSQ HOSP IP/OBS SF/LOW 25: CPT

## 2024-03-10 RX ADMIN — Medication 200 MILLIGRAM(S): at 08:44

## 2024-03-10 RX ADMIN — METFORMIN HYDROCHLORIDE 500 MILLIGRAM(S): 850 TABLET ORAL at 08:44

## 2024-03-10 RX ADMIN — Medication 650 MILLIGRAM(S): at 16:58

## 2024-03-10 RX ADMIN — Medication 200 MILLIGRAM(S): at 20:38

## 2024-03-10 RX ADMIN — ATORVASTATIN CALCIUM 40 MILLIGRAM(S): 80 TABLET, FILM COATED ORAL at 20:38

## 2024-03-10 RX ADMIN — LOSARTAN POTASSIUM 100 MILLIGRAM(S): 100 TABLET, FILM COATED ORAL at 08:44

## 2024-03-10 RX ADMIN — Medication 50 MICROGRAM(S): at 06:48

## 2024-03-10 RX ADMIN — POLYETHYLENE GLYCOL 3350 17 GRAM(S): 17 POWDER, FOR SOLUTION ORAL at 10:37

## 2024-03-10 RX ADMIN — SENNA PLUS 2 TABLET(S): 8.6 TABLET ORAL at 20:38

## 2024-03-10 RX ADMIN — Medication 650 MILLIGRAM(S): at 01:16

## 2024-03-10 RX ADMIN — Medication 650 MILLIGRAM(S): at 16:28

## 2024-03-10 RX ADMIN — METFORMIN HYDROCHLORIDE 500 MILLIGRAM(S): 850 TABLET ORAL at 20:38

## 2024-03-10 NOTE — BH INPATIENT PSYCHIATRY PROGRESS NOTE - NSBHFUPINTERVALHXFT_PSY_A_CORE
Chart reviewed and case discussed with treatment team. No events reported overnight. Sleep was "on and off" and appetite is less.  Patient denies any current SI or AVH. Patient remains isolative to his room. Patient is compliant with medications, no adverse effects reported.

## 2024-03-10 NOTE — BH INPATIENT PSYCHIATRY PROGRESS NOTE - NSBHCHARTREVIEWVS_PSY_A_CORE FT
Vital Signs Last 24 Hrs  T(C): 36.6 (03-10-24 @ 08:33), Max: 36.6 (03-10-24 @ 08:33)  T(F): 97.8 (03-10-24 @ 08:33), Max: 97.8 (03-10-24 @ 08:33)  HR: --  BP: --  BP(mean): --  RR: 16 (03-10-24 @ 08:33) (16 - 16)  SpO2: --    Orthostatic VS  03-10-24 @ 10:17  Lying BP: --/-- HR: --  Sitting BP: 139/78 HR: 70  Standing BP: 151/80 HR: 62  Site: --  Mode: --  Orthostatic VS  03-10-24 @ 08:33  Lying BP: --/-- HR: --  Sitting BP: 143/98 HR: 99  Standing BP: 152/87 HR: 84  Site: --  Mode: --  Orthostatic VS  03-09-24 @ 07:45  Lying BP: --/-- HR: --  Sitting BP: 147/87 HR: 75  Standing BP: 142/89 HR: 82  Site: --  Mode: electronic  Orthostatic VS  03-08-24 @ 20:01  Lying BP: --/-- HR: --  Sitting BP: 157/88 HR: 73  Standing BP: 167/85 HR: 79  Site: --  Mode: --  Orthostatic VS  03-08-24 @ 14:52  Lying BP: --/-- HR: --  Sitting BP: 179/85 HR: 72  Standing BP: 175/87 HR: 82  Site: --  Mode: --

## 2024-03-11 LAB
GLUCOSE BLDC GLUCOMTR-MCNC: 108 MG/DL — HIGH (ref 70–99)
GLUCOSE BLDC GLUCOMTR-MCNC: 110 MG/DL — HIGH (ref 70–99)
GLUCOSE BLDC GLUCOMTR-MCNC: 111 MG/DL — HIGH (ref 70–99)

## 2024-03-11 PROCEDURE — 99232 SBSQ HOSP IP/OBS MODERATE 35: CPT | Mod: FS

## 2024-03-11 RX ORDER — LANOLIN ALCOHOL/MO/W.PET/CERES
5 CREAM (GRAM) TOPICAL AT BEDTIME
Refills: 0 | Status: DISCONTINUED | OUTPATIENT
Start: 2024-03-11 | End: 2024-03-15

## 2024-03-11 RX ADMIN — Medication 650 MILLIGRAM(S): at 07:22

## 2024-03-11 RX ADMIN — LOSARTAN POTASSIUM 100 MILLIGRAM(S): 100 TABLET, FILM COATED ORAL at 08:51

## 2024-03-11 RX ADMIN — Medication 650 MILLIGRAM(S): at 07:52

## 2024-03-11 RX ADMIN — ATORVASTATIN CALCIUM 40 MILLIGRAM(S): 80 TABLET, FILM COATED ORAL at 20:13

## 2024-03-11 RX ADMIN — METFORMIN HYDROCHLORIDE 500 MILLIGRAM(S): 850 TABLET ORAL at 20:13

## 2024-03-11 RX ADMIN — Medication 50 MICROGRAM(S): at 06:01

## 2024-03-11 RX ADMIN — Medication 200 MILLIGRAM(S): at 20:13

## 2024-03-11 RX ADMIN — Medication 650 MILLIGRAM(S): at 16:07

## 2024-03-11 RX ADMIN — SENNA PLUS 2 TABLET(S): 8.6 TABLET ORAL at 20:13

## 2024-03-11 RX ADMIN — Medication 5 MILLIGRAM(S): at 20:13

## 2024-03-11 RX ADMIN — POLYETHYLENE GLYCOL 3350 17 GRAM(S): 17 POWDER, FOR SOLUTION ORAL at 08:51

## 2024-03-11 RX ADMIN — Medication 650 MILLIGRAM(S): at 16:37

## 2024-03-11 RX ADMIN — Medication 200 MILLIGRAM(S): at 08:51

## 2024-03-11 RX ADMIN — METFORMIN HYDROCHLORIDE 500 MILLIGRAM(S): 850 TABLET ORAL at 08:51

## 2024-03-11 NOTE — BH SOCIAL WORK INITIAL PSYCHOSOCIAL EVALUATION - NSBHCOMMOTHER_PSY_ALL_CORE
Pt. Admitted in stable condition. Consent signed. VS obtained and WNL. INT inserted without complications. Discharge instructions reviewed with the pt. Pt. Denies questions. AVS given to pt. Pt. Denies all other needs. Warm blanket provided. Call light left within reach. None

## 2024-03-11 NOTE — DIETITIAN INITIAL EVALUATION ADULT - OTHER INFO
Pt is a 59 y/o male with PPHx of Schizoaffective disorder, and Bipolar Disorder.  PMHx: HTN, Type 2 Diabetes, Hypogammaglobulinemia.  Pt presents himself for AH,SI, HI.  Saw Pt in his room. Reports eating well. Noted Pt edentulous. Pt is on easy   to chew diet which is not available in house. Will send soft and bite size diet.  Pt stays can eat regular food (sandwiches and pasta). Denies chewing/swallowing difficulty.  No GI distress noted. Provided verbal and written education re: Plate Method and heart healthy eating. Pt verbalized understanding.  UBW: 260 lbs? NKFA. Per J RD note on Nov. 2023, Pt 's weight was 245.6 lbs and on regular texture diet.  Pt is a 57 y/o male with PPHx of Schizoaffective disorder, and Bipolar Disorder.  PMHx: HTN, Type 2 Diabetes, Hypogammaglobulinemia.  Pt presents himself for AH, SI, HI.  Saw Pt in his room. Reports eating well. Noted Pt edentulous. Pt is on easy   to chew diet which is not available in house. Will send soft and bite sized diet.  Pt stays can eat regular food (sandwiches and pasta). Denies chewing/swallowing difficulty.  No GI distress noted. Provided verbal and written education re: Plate Method and heart healthy eating. Pt verbalized understanding.  UBW: 260 lbs? NKFA. Per J RD note on Nov. 2023, Pt 's weight was 245.6 lbs and on regular texture diet.

## 2024-03-11 NOTE — BH INPATIENT PSYCHIATRY PROGRESS NOTE - NSBHCHARTREVIEWVS_PSY_A_CORE FT
Vital Signs Last 24 Hrs  T(C): 36.5 (03-11-24 @ 08:17), Max: 36.8 (03-10-24 @ 20:09)  T(F): 97.7 (03-11-24 @ 08:17), Max: 98.2 (03-10-24 @ 20:09)  HR: 59 (03-10-24 @ 23:18) (59 - 59)  BP: 141/77 (03-10-24 @ 23:18) (141/77 - 141/77)  BP(mean): --  RR: 16 (03-10-24 @ 20:09) (16 - 16)  SpO2: 99% (03-10-24 @ 23:18) (99% - 99%)    Orthostatic VS  03-11-24 @ 08:17  Lying BP: --/-- HR: --  Sitting BP: 159/86 HR: 68  Standing BP: 163/89 HR: 74  Site: --  Mode: --  Orthostatic VS  03-10-24 @ 20:09  Lying BP: --/-- HR: --  Sitting BP: 155/83 HR: 70  Standing BP: 157/84 HR: 73  Site: --  Mode: --  Orthostatic VS  03-10-24 @ 10:17  Lying BP: --/-- HR: --  Sitting BP: 139/78 HR: 70  Standing BP: 151/80 HR: 62  Site: --  Mode: --  Orthostatic VS  03-10-24 @ 08:33  Lying BP: --/-- HR: --  Sitting BP: 143/98 HR: 99  Standing BP: 152/87 HR: 84  Site: --  Mode: --   Vital Signs Last 24 Hrs  T(C): 36.7 (03-12-24 @ 08:22), Max: 36.7 (03-11-24 @ 19:32)  T(F): 98.1 (03-12-24 @ 08:22), Max: 98.1 (03-11-24 @ 19:32)  HR: --  BP: --  BP(mean): --  RR: 17 (03-12-24 @ 08:22) (17 - 18)  SpO2: --    Orthostatic VS  03-12-24 @ 08:22  Lying BP: --/-- HR: --  Sitting BP: 146/77 HR: 72  Standing BP: 138/76 HR: 81  Site: --  Mode: --  Orthostatic VS  03-11-24 @ 19:32  Lying BP: --/-- HR: --  Sitting BP: 168/89 HR: 70  Standing BP: 160/85 HR: 77  Site: --  Mode: --  Orthostatic VS  03-11-24 @ 08:17  Lying BP: --/-- HR: --  Sitting BP: 159/86 HR: 68  Standing BP: 163/89 HR: 74  Site: --  Mode: --  Orthostatic VS  03-10-24 @ 20:09  Lying BP: --/-- HR: --  Sitting BP: 155/83 HR: 70  Standing BP: 157/84 HR: 73  Site: --  Mode: --

## 2024-03-11 NOTE — DIETITIAN INITIAL EVALUATION ADULT - DIET TYPE
DASH/TLC (sodium and cholesterol restricted diet)/regular/consistent carbohydrate (evening snack) regular/consistent carbohydrate (evening snack)
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
## 2024-03-11 NOTE — BH INPATIENT PSYCHIATRY PROGRESS NOTE - NSBHMETABOLIC_PSY_ALL_CORE_FT
BMI: BMI (kg/m2): 33.4 (03-08-24 @ 14:52)  HbA1c: A1C with Estimated Average Glucose Result: 5.6 % (03-09-24 @ 09:43)    Glucose: POCT Blood Glucose.: 111 mg/dL (03-11-24 @ 11:05)    BP: 141/77 (03-10-24 @ 23:18) (141/77 - 141/77)Vital Signs Last 24 Hrs  T(C): 36.5 (03-11-24 @ 08:17), Max: 36.8 (03-10-24 @ 20:09)  T(F): 97.7 (03-11-24 @ 08:17), Max: 98.2 (03-10-24 @ 20:09)  HR: 59 (03-10-24 @ 23:18) (59 - 59)  BP: 141/77 (03-10-24 @ 23:18) (141/77 - 141/77)  BP(mean): --  RR: 16 (03-10-24 @ 20:09) (16 - 16)  SpO2: 99% (03-10-24 @ 23:18) (99% - 99%)    Orthostatic VS  03-11-24 @ 08:17  Lying BP: --/-- HR: --  Sitting BP: 159/86 HR: 68  Standing BP: 163/89 HR: 74  Site: --  Mode: --  Orthostatic VS  03-10-24 @ 20:09  Lying BP: --/-- HR: --  Sitting BP: 155/83 HR: 70  Standing BP: 157/84 HR: 73  Site: --  Mode: --  Orthostatic VS  03-10-24 @ 10:17  Lying BP: --/-- HR: --  Sitting BP: 139/78 HR: 70  Standing BP: 151/80 HR: 62  Site: --  Mode: --  Orthostatic VS  03-10-24 @ 08:33  Lying BP: --/-- HR: --  Sitting BP: 143/98 HR: 99  Standing BP: 152/87 HR: 84  Site: --  Mode: --    Lipid Panel: Date/Time: 03-09-24 @ 09:43  Cholesterol, Serum: 187  LDL Cholesterol Calculated: 123  HDL Cholesterol, Serum: 37  Total Cholesterol/HDL Ration Measurement: --  Triglycerides, Serum: 135   BMI: BMI (kg/m2): 33.4 (03-08-24 @ 14:52)  HbA1c: A1C with Estimated Average Glucose Result: 5.6 % (03-09-24 @ 09:43)    Glucose: POCT Blood Glucose.: 95 mg/dL (03-12-24 @ 11:16)    BP: 141/77 (03-10-24 @ 23:18) (141/77 - 141/77)Vital Signs Last 24 Hrs  T(C): 36.7 (03-12-24 @ 08:22), Max: 36.7 (03-11-24 @ 19:32)  T(F): 98.1 (03-12-24 @ 08:22), Max: 98.1 (03-11-24 @ 19:32)  HR: --  BP: --  BP(mean): --  RR: 17 (03-12-24 @ 08:22) (17 - 18)  SpO2: --    Orthostatic VS  03-12-24 @ 08:22  Lying BP: --/-- HR: --  Sitting BP: 146/77 HR: 72  Standing BP: 138/76 HR: 81  Site: --  Mode: --  Orthostatic VS  03-11-24 @ 19:32  Lying BP: --/-- HR: --  Sitting BP: 168/89 HR: 70  Standing BP: 160/85 HR: 77  Site: --  Mode: --  Orthostatic VS  03-11-24 @ 08:17  Lying BP: --/-- HR: --  Sitting BP: 159/86 HR: 68  Standing BP: 163/89 HR: 74  Site: --  Mode: --  Orthostatic VS  03-10-24 @ 20:09  Lying BP: --/-- HR: --  Sitting BP: 155/83 HR: 70  Standing BP: 157/84 HR: 73  Site: --  Mode: --    Lipid Panel: Date/Time: 03-09-24 @ 09:43  Cholesterol, Serum: 187  LDL Cholesterol Calculated: 123  HDL Cholesterol, Serum: 37  Total Cholesterol/HDL Ration Measurement: --  Triglycerides, Serum: 135

## 2024-03-11 NOTE — DIETITIAN INITIAL EVALUATION ADULT - PERTINENT LABORATORY DATA
CAPILLARY BLOOD GLUCOSE    POCT Blood Glucose.: 111 mg/dL (11 Mar 2024 11:05)  POCT Blood Glucose.: 98 mg/dL (10 Mar 2024 20:07)  POCT Blood Glucose.: 95 mg/dL (10 Mar 2024 16:16)    A1C with Estimated Average Glucose Result: 5.6 % (03-09-24 @ 09:43)    3/9 HDL 37 L         H

## 2024-03-11 NOTE — BH INPATIENT PSYCHIATRY PROGRESS NOTE - CURRENT MEDICATION
MEDICATIONS  (STANDING):  atorvastatin 40 milliGRAM(s) Oral at bedtime  dextrose 5%. 1000 milliLiter(s) (50 mL/Hr) IV Continuous <Continuous>  dextrose 5%. 1000 milliLiter(s) (100 mL/Hr) IV Continuous <Continuous>  dextrose 50% Injectable 12.5 Gram(s) IV Push once  dextrose 50% Injectable 25 Gram(s) IV Push once  dextrose 50% Injectable 25 Gram(s) IV Push once  glucagon  Injectable 1 milliGRAM(s) IntraMuscular once  insulin lispro (ADMELOG) corrective regimen sliding scale   SubCutaneous three times a day before meals  labetalol 200 milliGRAM(s) Oral every 12 hours  levothyroxine 50 MICROGram(s) Oral daily  losartan 100 milliGRAM(s) Oral daily  melatonin. 5 milliGRAM(s) Oral at bedtime  metFORMIN 500 milliGRAM(s) Oral two times a day  polyethylene glycol 3350 17 Gram(s) Oral daily  senna 2 Tablet(s) Oral at bedtime    MEDICATIONS  (PRN):  acetaminophen     Tablet .. 650 milliGRAM(s) Oral every 6 hours PRN Temp greater or equal to 38C (100.4F), Mild Pain (1 - 3), Moderate Pain (4 - 6), Severe Pain (7 - 10)  dextrose Oral Gel 15 Gram(s) Oral once PRN Blood Glucose LESS THAN 70 milliGRAM(s)/deciliter  haloperidol     Tablet 5 milliGRAM(s) Oral every 6 hours PRN agitation  haloperidol    Injectable 5 milliGRAM(s) IntraMuscular once PRN Agitation  lactulose Syrup 10 Gram(s) Oral daily PRN constipation  LORazepam     Tablet 2 milliGRAM(s) Oral every 6 hours PRN Agitation  LORazepam   Injectable 2 milliGRAM(s) IntraMuscular Once PRN Agitation  nicotine  Polacrilex Gum 2 milliGRAM(s) Oral every 2 hours PRN breakthrough cravings   MEDICATIONS  (STANDING):  atorvastatin 40 milliGRAM(s) Oral at bedtime  ciprofloxacin  0.3% Ophthalmic Solution 2 Drop(s) Both EYES four times a day  dextrose 5%. 1000 milliLiter(s) (50 mL/Hr) IV Continuous <Continuous>  dextrose 5%. 1000 milliLiter(s) (100 mL/Hr) IV Continuous <Continuous>  dextrose 50% Injectable 25 Gram(s) IV Push once  dextrose 50% Injectable 25 Gram(s) IV Push once  dextrose 50% Injectable 12.5 Gram(s) IV Push once  glucagon  Injectable 1 milliGRAM(s) IntraMuscular once  insulin lispro (ADMELOG) corrective regimen sliding scale   SubCutaneous three times a day before meals  labetalol 200 milliGRAM(s) Oral every 12 hours  levothyroxine 50 MICROGram(s) Oral daily  losartan 100 milliGRAM(s) Oral daily  melatonin. 5 milliGRAM(s) Oral at bedtime  metFORMIN 500 milliGRAM(s) Oral two times a day  polyethylene glycol 3350 17 Gram(s) Oral daily  senna 2 Tablet(s) Oral at bedtime    MEDICATIONS  (PRN):  acetaminophen     Tablet .. 650 milliGRAM(s) Oral every 6 hours PRN Temp greater or equal to 38C (100.4F), Mild Pain (1 - 3), Moderate Pain (4 - 6), Severe Pain (7 - 10)  dextrose Oral Gel 15 Gram(s) Oral once PRN Blood Glucose LESS THAN 70 milliGRAM(s)/deciliter  haloperidol     Tablet 5 milliGRAM(s) Oral every 6 hours PRN agitation  haloperidol    Injectable 5 milliGRAM(s) IntraMuscular once PRN Agitation  lactulose Syrup 10 Gram(s) Oral daily PRN constipation  LORazepam     Tablet 2 milliGRAM(s) Oral every 6 hours PRN Agitation  LORazepam   Injectable 2 milliGRAM(s) IntraMuscular Once PRN Agitation  nicotine  Polacrilex Gum 2 milliGRAM(s) Oral every 2 hours PRN breakthrough cravings
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
## 2024-03-11 NOTE — DIETITIAN INITIAL EVALUATION ADULT - PERTINENT MEDS FT
MEDICATIONS  (STANDING):  atorvastatin 40 milliGRAM(s) Oral at bedtime  dextrose 5%. 1000 milliLiter(s) (50 mL/Hr) IV Continuous <Continuous>  dextrose 5%. 1000 milliLiter(s) (100 mL/Hr) IV Continuous <Continuous>  dextrose 50% Injectable 12.5 Gram(s) IV Push once  dextrose 50% Injectable 25 Gram(s) IV Push once  dextrose 50% Injectable 25 Gram(s) IV Push once  glucagon  Injectable 1 milliGRAM(s) IntraMuscular once  insulin lispro (ADMELOG) corrective regimen sliding scale   SubCutaneous three times a day before meals  labetalol 200 milliGRAM(s) Oral every 12 hours  levothyroxine 50 MICROGram(s) Oral daily  losartan 100 milliGRAM(s) Oral daily  melatonin. 5 milliGRAM(s) Oral at bedtime  metFORMIN 500 milliGRAM(s) Oral two times a day  polyethylene glycol 3350 17 Gram(s) Oral daily  senna 2 Tablet(s) Oral at bedtime
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
## 2024-03-12 LAB
ALBUMIN SERPL ELPH-MCNC: 4.1 G/DL — SIGNIFICANT CHANGE UP (ref 3.3–5)
ALP SERPL-CCNC: 127 U/L — HIGH (ref 40–120)
ALT FLD-CCNC: 14 U/L — SIGNIFICANT CHANGE UP (ref 4–41)
ANION GAP SERPL CALC-SCNC: 12 MMOL/L — SIGNIFICANT CHANGE UP (ref 7–14)
ANION GAP SERPL CALC-SCNC: 13 MMOL/L — SIGNIFICANT CHANGE UP (ref 7–14)
AST SERPL-CCNC: 22 U/L — SIGNIFICANT CHANGE UP (ref 4–40)
BILIRUB SERPL-MCNC: 0.5 MG/DL — SIGNIFICANT CHANGE UP (ref 0.2–1.2)
BUN SERPL-MCNC: 10 MG/DL — SIGNIFICANT CHANGE UP (ref 7–23)
BUN SERPL-MCNC: 11 MG/DL — SIGNIFICANT CHANGE UP (ref 7–23)
CALCIUM SERPL-MCNC: 9.4 MG/DL — SIGNIFICANT CHANGE UP (ref 8.4–10.5)
CALCIUM SERPL-MCNC: 9.8 MG/DL — SIGNIFICANT CHANGE UP (ref 8.4–10.5)
CHLORIDE SERPL-SCNC: 86 MMOL/L — LOW (ref 98–107)
CHLORIDE SERPL-SCNC: 89 MMOL/L — LOW (ref 98–107)
CHLORIDE UR-SCNC: <20 MMOL/L — SIGNIFICANT CHANGE UP
CO2 SERPL-SCNC: 25 MMOL/L — SIGNIFICANT CHANGE UP (ref 22–31)
CO2 SERPL-SCNC: 26 MMOL/L — SIGNIFICANT CHANGE UP (ref 22–31)
CREAT SERPL-MCNC: 0.75 MG/DL — SIGNIFICANT CHANGE UP (ref 0.5–1.3)
CREAT SERPL-MCNC: 0.79 MG/DL — SIGNIFICANT CHANGE UP (ref 0.5–1.3)
EGFR: 104 ML/MIN/1.73M2 — SIGNIFICANT CHANGE UP
EGFR: 106 ML/MIN/1.73M2 — SIGNIFICANT CHANGE UP
GLUCOSE BLDC GLUCOMTR-MCNC: 110 MG/DL — HIGH (ref 70–99)
GLUCOSE BLDC GLUCOMTR-MCNC: 72 MG/DL — SIGNIFICANT CHANGE UP (ref 70–99)
GLUCOSE BLDC GLUCOMTR-MCNC: 93 MG/DL — SIGNIFICANT CHANGE UP (ref 70–99)
GLUCOSE BLDC GLUCOMTR-MCNC: 95 MG/DL — SIGNIFICANT CHANGE UP (ref 70–99)
GLUCOSE SERPL-MCNC: 135 MG/DL — HIGH (ref 70–99)
GLUCOSE SERPL-MCNC: 77 MG/DL — SIGNIFICANT CHANGE UP (ref 70–99)
MAGNESIUM SERPL-MCNC: 2 MG/DL — SIGNIFICANT CHANGE UP (ref 1.6–2.6)
OSMOLALITY SERPL: 269 MOSM/KG — LOW (ref 275–295)
OSMOLALITY UR: 106 MOSM/KG — SIGNIFICANT CHANGE UP (ref 50–1200)
PHOSPHATE SERPL-MCNC: 3.1 MG/DL — SIGNIFICANT CHANGE UP (ref 2.5–4.5)
POTASSIUM SERPL-MCNC: 4.3 MMOL/L — SIGNIFICANT CHANGE UP (ref 3.5–5.3)
POTASSIUM SERPL-MCNC: 4.4 MMOL/L — SIGNIFICANT CHANGE UP (ref 3.5–5.3)
POTASSIUM SERPL-SCNC: 4.3 MMOL/L — SIGNIFICANT CHANGE UP (ref 3.5–5.3)
POTASSIUM SERPL-SCNC: 4.4 MMOL/L — SIGNIFICANT CHANGE UP (ref 3.5–5.3)
POTASSIUM UR-SCNC: 10.2 MMOL/L — SIGNIFICANT CHANGE UP
PROT SERPL-MCNC: 7.1 G/DL — SIGNIFICANT CHANGE UP (ref 6–8.3)
SODIUM SERPL-SCNC: 125 MMOL/L — LOW (ref 135–145)
SODIUM SERPL-SCNC: 126 MMOL/L — LOW (ref 135–145)
SODIUM UR-SCNC: <20 MMOL/L — SIGNIFICANT CHANGE UP

## 2024-03-12 PROCEDURE — 99223 1ST HOSP IP/OBS HIGH 75: CPT

## 2024-03-12 PROCEDURE — 99232 SBSQ HOSP IP/OBS MODERATE 35: CPT | Mod: FS

## 2024-03-12 RX ORDER — CIPROFLOXACIN HCL 0.3 %
2 DROPS OPHTHALMIC (EYE)
Refills: 0 | Status: DISCONTINUED | OUTPATIENT
Start: 2024-03-12 | End: 2024-03-15

## 2024-03-12 RX ADMIN — Medication 200 MILLIGRAM(S): at 20:23

## 2024-03-12 RX ADMIN — Medication 2 DROP(S): at 17:25

## 2024-03-12 RX ADMIN — Medication 5 MILLIGRAM(S): at 20:22

## 2024-03-12 RX ADMIN — POLYETHYLENE GLYCOL 3350 17 GRAM(S): 17 POWDER, FOR SOLUTION ORAL at 09:11

## 2024-03-12 RX ADMIN — Medication 50 MICROGRAM(S): at 06:18

## 2024-03-12 RX ADMIN — LOSARTAN POTASSIUM 100 MILLIGRAM(S): 100 TABLET, FILM COATED ORAL at 08:17

## 2024-03-12 RX ADMIN — METFORMIN HYDROCHLORIDE 500 MILLIGRAM(S): 850 TABLET ORAL at 08:17

## 2024-03-12 RX ADMIN — ATORVASTATIN CALCIUM 40 MILLIGRAM(S): 80 TABLET, FILM COATED ORAL at 20:23

## 2024-03-12 RX ADMIN — Medication 2 DROP(S): at 20:23

## 2024-03-12 RX ADMIN — Medication 200 MILLIGRAM(S): at 08:17

## 2024-03-12 RX ADMIN — METFORMIN HYDROCHLORIDE 500 MILLIGRAM(S): 850 TABLET ORAL at 20:23

## 2024-03-12 RX ADMIN — SENNA PLUS 2 TABLET(S): 8.6 TABLET ORAL at 20:23

## 2024-03-12 NOTE — CONSULT NOTE ADULT - SUBJECTIVE AND OBJECTIVE BOX
HPI: 58 M with HTN, T2DM (on Metformin), HLD, CVID with hypogammaglobulinemia, schizoaffective disorder with multiple psych hospitalizations,  in treatment at [facility] with DR. Cook , and is on [facility]   presenting for evaluation of worsening auditory hallucinations today.  Medicine consulted for low sodium   Pt reports drinking about 3 ounces of water every 45 mins as it is a habit           PAST MEDICAL & SURGICAL HISTORY:  Smoker  DM (diabetes mellitus)  HTN (hypertension)  Abscess of finger  Bipolar disorder  Chronic hyponatremia  CVID (common variable immunodeficiency)  Smoker  Deviated septum  Loss of teeth due to extraction        Review of Systems:   CONSTITUTIONAL: No fever, weight loss, or fatigue  EYES: No eye pain, visual disturbances, or discharge  ENMT:  No difficulty hearing, tinnitus, vertigo; No sinus or throat pain  NECK: No pain or stiffness  RESPIRATORY: No cough, wheezing, chills or hemoptysis; No shortness of breath  CARDIOVASCULAR: No chest pain, palpitations, dizziness, or leg swelling  GASTROINTESTINAL: No abdominal or epigastric pain. No nausea, vomiting, or hematemesis; No diarrhea or constipation. No melena or hematochezia.  GENITOURINARY: No dysuria, frequency, hematuria, or incontinence  NEUROLOGICAL: No headaches, memory loss, loss of strength, numbness, or tremors  SKIN: No itching, burning, rashes, or lesions   LYMPH NODES: No enlarged glands  ENDOCRINE: No heat or cold intolerance; No hair loss  MUSCULOSKELETAL: No joint pain or swelling; No muscle, back, or extremity pain  HEME/LYMPH: No easy bruising, or bleeding gums  ALLERY AND IMMUNOLOGIC: No hives or eczema    Allergies  amoxicillin (Fever)  penicillin (Rash)  Intolerances      Social History:  lives in supportive housing, on disability, close to sisters Susannah and Brittany ([phone])      FAMILY HISTORY:   Family history of throat cancer (Father)  Family history of leukemia (Mother)        MEDICATIONS  (STANDING):  atorvastatin 40 milliGRAM(s) Oral at bedtime  ciprofloxacin  0.3% Ophthalmic Solution 2 Drop(s) Both EYES four times a day  dextrose 5%. 1000 milliLiter(s) (50 mL/Hr) IV Continuous <Continuous>  dextrose 5%. 1000 milliLiter(s) (100 mL/Hr) IV Continuous <Continuous>  dextrose 50% Injectable 12.5 Gram(s) IV Push once  dextrose 50% Injectable 25 Gram(s) IV Push once  dextrose 50% Injectable 25 Gram(s) IV Push once  glucagon  Injectable 1 milliGRAM(s) IntraMuscular once  insulin lispro (ADMELOG) corrective regimen sliding scale   SubCutaneous three times a day before meals  labetalol 200 milliGRAM(s) Oral every 12 hours  levothyroxine 50 MICROGram(s) Oral daily  losartan 100 milliGRAM(s) Oral daily  melatonin. 5 milliGRAM(s) Oral at bedtime  metFORMIN 500 milliGRAM(s) Oral two times a day  polyethylene glycol 3350 17 Gram(s) Oral daily  senna 2 Tablet(s) Oral at bedtime    MEDICATIONS  (PRN):  acetaminophen     Tablet .. 650 milliGRAM(s) Oral every 6 hours PRN Temp greater or equal to 38C (100.4F), Mild Pain (1 - 3), Moderate Pain (4 - 6), Severe Pain (7 - 10)  dextrose Oral Gel 15 Gram(s) Oral once PRN Blood Glucose LESS THAN 70 milliGRAM(s)/deciliter  haloperidol     Tablet 5 milliGRAM(s) Oral every 6 hours PRN agitation  haloperidol    Injectable 5 milliGRAM(s) IntraMuscular once PRN Agitation  lactulose Syrup 10 Gram(s) Oral daily PRN constipation  LORazepam     Tablet 2 milliGRAM(s) Oral every 6 hours PRN Agitation  LORazepam   Injectable 2 milliGRAM(s) IntraMuscular Once PRN Agitation  nicotine  Polacrilex Gum 2 milliGRAM(s) Oral every 2 hours PRN breakthrough cravings      Vital Signs Last 24 Hrs  T(C): 36.7 (12 Mar 2024 08:22), Max: 36.7 (11 Mar 2024 19:32)  T(F): 98.1 (12 Mar 2024 08:22), Max: 98.1 (11 Mar 2024 19:32)  HR: --  BP: --  BP(mean): --  RR: 17 (12 Mar 2024 08:22) (17 - 18)  SpO2: --      CAPILLARY BLOOD GLUCOSE      POCT Blood Glucose.: 95 mg/dL (12 Mar 2024 11:16)  POCT Blood Glucose.: 110 mg/dL (12 Mar 2024 07:23)  POCT Blood Glucose.: 108 mg/dL (11 Mar 2024 20:07)  POCT Blood Glucose.: 110 mg/dL (11 Mar 2024 16:41)        PHYSICAL EXAM:  GENERAL: NAD, well-developed  HEAD:  Atraumatic, Normocephalic  EYES: EOMI, conjunctiva and sclera clear  NECK: Supple, No JVD  CHEST/LUNG: Clear to auscultation bilaterally; No wheeze  HEART: Regular rate and rhythm; No murmurs, rubs, or gallops  ABDOMEN: Soft, Nontender, Nondistended; Bowel sounds present  EXTREMITIES:  2+ Peripheral Pulses, No clubbing, cyanosis, or edema  NEUROLOGY: non-focal  SKIN: No rashes or lesions    LABS:    03-12    125<L>  |  86<L>  |  11  ----------------------------<  135<H>  4.4   |  26  |  0.79    Ca    9.4      12 Mar 2024 09:30    TPro  7.1  /  Alb  4.1  /  TBili  0.5  /  DBili  x   /  AST  22  /  ALT  14  /  AlkPhos  127<H>  03-12          Urinalysis Basic - ( 12 Mar 2024 09:30 )    Color: x / Appearance: x / SG: x / pH: x  Gluc: 135 mg/dL / Ketone: x  / Bili: x / Urobili: x   Blood: x / Protein: x / Nitrite: x   Leuk Esterase: x / RBC: x / WBC x   Sq Epi: x / Non Sq Epi: x / Bacteria: x        EKG(personally reviewed):    RADIOLOGY & ADDITIONAL TESTS:    Imaging Personally Reviewed:    Consultant(s) Notes Reviewed:      Care Discussed with Consultants/Other Providers:

## 2024-03-12 NOTE — BH INPATIENT PSYCHIATRY PROGRESS NOTE - CURRENT MEDICATION
MEDICATIONS  (STANDING):  atorvastatin 40 milliGRAM(s) Oral at bedtime  ciprofloxacin  0.3% Ophthalmic Solution 2 Drop(s) Both EYES four times a day  dextrose 5%. 1000 milliLiter(s) (50 mL/Hr) IV Continuous <Continuous>  dextrose 5%. 1000 milliLiter(s) (100 mL/Hr) IV Continuous <Continuous>  dextrose 50% Injectable 25 Gram(s) IV Push once  dextrose 50% Injectable 25 Gram(s) IV Push once  dextrose 50% Injectable 12.5 Gram(s) IV Push once  glucagon  Injectable 1 milliGRAM(s) IntraMuscular once  insulin lispro (ADMELOG) corrective regimen sliding scale   SubCutaneous three times a day before meals  labetalol 200 milliGRAM(s) Oral every 12 hours  levothyroxine 50 MICROGram(s) Oral daily  losartan 100 milliGRAM(s) Oral daily  melatonin. 5 milliGRAM(s) Oral at bedtime  metFORMIN 500 milliGRAM(s) Oral two times a day  polyethylene glycol 3350 17 Gram(s) Oral daily  senna 2 Tablet(s) Oral at bedtime    MEDICATIONS  (PRN):  acetaminophen     Tablet .. 650 milliGRAM(s) Oral every 6 hours PRN Temp greater or equal to 38C (100.4F), Mild Pain (1 - 3), Moderate Pain (4 - 6), Severe Pain (7 - 10)  dextrose Oral Gel 15 Gram(s) Oral once PRN Blood Glucose LESS THAN 70 milliGRAM(s)/deciliter  haloperidol     Tablet 5 milliGRAM(s) Oral every 6 hours PRN agitation  haloperidol    Injectable 5 milliGRAM(s) IntraMuscular once PRN Agitation  lactulose Syrup 10 Gram(s) Oral daily PRN constipation  LORazepam     Tablet 2 milliGRAM(s) Oral every 6 hours PRN Agitation  LORazepam   Injectable 2 milliGRAM(s) IntraMuscular Once PRN Agitation  nicotine  Polacrilex Gum 2 milliGRAM(s) Oral every 2 hours PRN breakthrough cravings   MEDICATIONS  (STANDING):  atorvastatin 40 milliGRAM(s) Oral at bedtime  ciprofloxacin  0.3% Ophthalmic Solution 2 Drop(s) Both EYES four times a day  dextrose 5%. 1000 milliLiter(s) (50 mL/Hr) IV Continuous <Continuous>  dextrose 5%. 1000 milliLiter(s) (100 mL/Hr) IV Continuous <Continuous>  dextrose 50% Injectable 12.5 Gram(s) IV Push once  dextrose 50% Injectable 25 Gram(s) IV Push once  dextrose 50% Injectable 25 Gram(s) IV Push once  glucagon  Injectable 1 milliGRAM(s) IntraMuscular once  insulin lispro (ADMELOG) corrective regimen sliding scale   SubCutaneous three times a day before meals  labetalol 200 milliGRAM(s) Oral every 12 hours  levothyroxine 50 MICROGram(s) Oral daily  losartan 100 milliGRAM(s) Oral daily  melatonin. 5 milliGRAM(s) Oral at bedtime  metFORMIN 500 milliGRAM(s) Oral two times a day  polyethylene glycol 3350 17 Gram(s) Oral daily  senna 2 Tablet(s) Oral at bedtime    MEDICATIONS  (PRN):  acetaminophen     Tablet .. 650 milliGRAM(s) Oral every 6 hours PRN Temp greater or equal to 38C (100.4F), Mild Pain (1 - 3), Moderate Pain (4 - 6), Severe Pain (7 - 10)  dextrose Oral Gel 15 Gram(s) Oral once PRN Blood Glucose LESS THAN 70 milliGRAM(s)/deciliter  haloperidol     Tablet 5 milliGRAM(s) Oral every 6 hours PRN agitation  haloperidol    Injectable 5 milliGRAM(s) IntraMuscular once PRN Agitation  lactulose Syrup 10 Gram(s) Oral daily PRN constipation  LORazepam     Tablet 2 milliGRAM(s) Oral every 6 hours PRN Agitation  LORazepam   Injectable 2 milliGRAM(s) IntraMuscular Once PRN Agitation  nicotine  Polacrilex Gum 2 milliGRAM(s) Oral every 2 hours PRN breakthrough cravings

## 2024-03-12 NOTE — BH INPATIENT PSYCHIATRY PROGRESS NOTE - NSBHMETABOLIC_PSY_ALL_CORE_FT
BMI: BMI (kg/m2): 33.4 (03-08-24 @ 14:52)  HbA1c: A1C with Estimated Average Glucose Result: 5.6 % (03-09-24 @ 09:43)    Glucose: POCT Blood Glucose.: 95 mg/dL (03-12-24 @ 11:16)    BP: 141/77 (03-10-24 @ 23:18) (141/77 - 141/77)Vital Signs Last 24 Hrs  T(C): 36.7 (03-12-24 @ 08:22), Max: 36.7 (03-11-24 @ 19:32)  T(F): 98.1 (03-12-24 @ 08:22), Max: 98.1 (03-11-24 @ 19:32)  HR: --  BP: --  BP(mean): --  RR: 17 (03-12-24 @ 08:22) (17 - 18)  SpO2: --    Orthostatic VS  03-12-24 @ 08:22  Lying BP: --/-- HR: --  Sitting BP: 146/77 HR: 72  Standing BP: 138/76 HR: 81  Site: --  Mode: --  Orthostatic VS  03-11-24 @ 19:32  Lying BP: --/-- HR: --  Sitting BP: 168/89 HR: 70  Standing BP: 160/85 HR: 77  Site: --  Mode: --  Orthostatic VS  03-11-24 @ 08:17  Lying BP: --/-- HR: --  Sitting BP: 159/86 HR: 68  Standing BP: 163/89 HR: 74  Site: --  Mode: --  Orthostatic VS  03-10-24 @ 20:09  Lying BP: --/-- HR: --  Sitting BP: 155/83 HR: 70  Standing BP: 157/84 HR: 73  Site: --  Mode: --    Lipid Panel: Date/Time: 03-09-24 @ 09:43  Cholesterol, Serum: 187  LDL Cholesterol Calculated: 123  HDL Cholesterol, Serum: 37  Total Cholesterol/HDL Ration Measurement: --  Triglycerides, Serum: 135   BMI: BMI (kg/m2): 33.4 (03-08-24 @ 14:52)  HbA1c: A1C with Estimated Average Glucose Result: 5.6 % (03-09-24 @ 09:43)    Glucose: POCT Blood Glucose.: 103 mg/dL (03-13-24 @ 07:43)    BP: 141/77 (03-10-24 @ 23:18) (141/77 - 141/77)Vital Signs Last 24 Hrs  T(C): 37.1 (03-13-24 @ 08:22), Max: 37.1 (03-13-24 @ 08:22)  T(F): 98.7 (03-13-24 @ 08:22), Max: 98.7 (03-13-24 @ 08:22)  HR: --  BP: --  BP(mean): --  RR: 18 (03-12-24 @ 21:20) (18 - 18)  SpO2: --    Orthostatic VS  03-13-24 @ 08:22  Lying BP: --/-- HR: --  Sitting BP: 146/79 HR: 68  Standing BP: 128/70 HR: 82  Site: --  Mode: --  Orthostatic VS  03-12-24 @ 19:39  Lying BP: --/-- HR: --  Sitting BP: 120/85 HR: 81  Standing BP: 145/85 HR: 91  Site: --  Mode: --  Orthostatic VS  03-12-24 @ 08:22  Lying BP: --/-- HR: --  Sitting BP: 146/77 HR: 72  Standing BP: 138/76 HR: 81  Site: --  Mode: --  Orthostatic VS  03-11-24 @ 19:32  Lying BP: --/-- HR: --  Sitting BP: 168/89 HR: 70  Standing BP: 160/85 HR: 77  Site: --  Mode: --    Lipid Panel: Date/Time: 03-09-24 @ 09:43  Cholesterol, Serum: 187  LDL Cholesterol Calculated: 123  HDL Cholesterol, Serum: 37  Total Cholesterol/HDL Ration Measurement: --  Triglycerides, Serum: 135

## 2024-03-12 NOTE — BH TREATMENT PLAN - NSTXPSYCHOINTERMD_PSY_ALL_CORE
medication compliance and education  continue abilify BELLA
medication compliance and education  continue abilify BELLA

## 2024-03-12 NOTE — BH TREATMENT PLAN - NSTXDCOTHRINTERMD_PSY_ALL_CORE
medication compliance and education  pt will voice importance of medication adherence and follow up with outpatient psychiatry upon discharge

## 2024-03-12 NOTE — BH INPATIENT PSYCHIATRY PROGRESS NOTE - ATTENDING COMMENTS
Chart was reviewed and case discussed with the Psych NP. I agree with the assessment and plan as documented in the Psych NP's progress note and was directly involved in medical decision making.   Pt submitted 3DL. Pt sodium lower than upon admission (125), remains asymptomatic. On fluid restriction, medicine consulted and to follow.

## 2024-03-12 NOTE — BH INPATIENT PSYCHIATRY PROGRESS NOTE - NSBHFUPINTERVALHXFT_PSY_A_CORE
Chart and history reviewed and discussed with treatment team. No events reported overnight. Seen with RN. Labs drawn to assess NA, results pending. Pt states that he feels his mood is "much better" and requested dischrge by 3 day letter. Pt denies any AVH at this time and clarifies that his paranoia regarding the [place] mob is "chronic" and states that he feels safe to return home. Pt states that when he doesn't feel safe at home, he checks himself into the hospital, but now feels "it's time for me to go." Discussed safety planning with need to coordinate with pt's outpatient psychiatrist and housing, with pt in agreement. Pt assessed for bilateral red eyes, progressively worse from yesterday which was isolated to the L eye. Pt now reports increased in tear production, gritty feeling, crusts upon waking this morning. Cold compresses encouraged. Pt denies any allergies or other sensitivities that may have triggered this. Suspected conjunctivitis at this time with hospitalist consulted and antibiotic drops ordered. Pt remains in fluid restrictions of 1000cc at this time. No behavioral issues noted. Medication compliant, tolerating well, free of EPS. Sleep and appetite maintained. Denies SI/HI/AVH. Continue to monitor for safety.    Chart and history reviewed and discussed with treatment team. No events reported overnight. Seen with RN. Pt states that he feels his mood is "much better" and requested discharge by 3 day letter. Pt denies any AVH at this time and clarifies that his paranoia regarding the [place] mob is "chronic" and states that he feels safe to return home. Pt states that when he doesn't feel safe at home, he checks himself into the hospital, but now feels "it's time for me to go." Discussed safety planning with need to coordinate with pt's outpatient psychiatrist and housing, with pt in agreement. Pt assessed for bilateral red eyes, progressively worse from yesterday which was isolated to the L eye. Pt now reports increased in tear production, gritty feeling, crusts upon waking this morning. Cold compresses encouraged. Pt denies any allergies or other sensitivities that may have triggered this. Suspected conjunctivitis at this time with hospitalist consulted and antibiotic drops ordered. Pt remains in fluid restrictions of 1000cc at this time. No behavioral issues noted. Medication compliant, tolerating well, free of EPS. Sleep and appetite maintained. Denies SI/HI/AVH. Continue to monitor for safety.     Labs reviewed with . Hospitalist consulted with STAT BMP ordered for reassessment and plan to send to ED if pt continues to trend low. Pt currently denies any symptoms of hyponatremia. Per nursing, pt has been compliant with fluid restriction.  Chart and history reviewed and discussed with treatment team. No events reported overnight. Seen with RN. Pt states that he feels his mood is "much better" and requested discharge by 3 day letter. Pt denies any AVH at this time and clarifies that his paranoia regarding the [place] mob is "chronic" and states that he feels safe to return home. Pt states that when he doesn't feel safe at home, he checks himself into the hospital, but now feels "it's time for me to go." Discussed safety planning with need to coordinate with pt's outpatient psychiatrist and housing, with pt in agreement. Pt assessed for bilateral red eyes, progressively worse from yesterday which was isolated to the L eye. Pt now reports increased in tear production, gritty feeling, crusts upon waking this morning. Cold compresses encouraged. Pt denies any allergies or other sensitivities that may have triggered this. Suspected conjunctivitis at this time with hospitalist consulted and antibiotic drops ordered. Pt remains in fluid restrictions of 1000cc at this time. No behavioral issues noted. Medication compliant, tolerating well, free of EPS. Sleep and appetite maintained. Denies SI/HI/AVH. Continue to monitor for safety.     Labs reviewed with . Hospitalist consulted with STAT BMP ordered for reassessment and plan to send to ED if pt continues to trend low. Pt currently denies any symptoms of hyponatremia. Per nursing, pt has been compliant with fluid restriction. NP spoke with hospitalist after pt assessment. Per hospitalist, pt stated that he drinks water every 45 minutes out of habit. Hospitalist placed pt on strict fluid restriction of 800cc with I/o monitoring and repeat BMP for tomorrow.  Chart and history reviewed and discussed with treatment team. No events reported overnight. Seen with RN. Pt states that he feels his mood is "much better" and requested discharge by 3 day letter. Pt denies any AVH at this time and clarifies that his paranoia regarding the [place] mob is "chronic" and states that he feels safe to return home. Pt states that when he doesn't feel safe at home, he checks himself into the hospital, but now feels "it's time for me to go." Discussed safety planning with need to coordinate with pt's outpatient psychiatrist and housing, with pt in agreement. Pt assessed for bilateral red eyes, progressively worse from yesterday which was isolated to the L eye. Pt now reports increased in tear production, gritty feeling, crusts upon waking this morning. Cold compresses encouraged. Pt denies any allergies or other sensitivities that may have triggered this. Suspected conjunctivitis at this time with hospitalist consulted and antibiotic drops ordered. Pt remains in fluid restrictions of 1000cc at this time. No behavioral issues noted. Medication compliant, tolerating well, free of EPS. Sleep and appetite maintained. Denies SI/HI/AVH. Continue to monitor for safety.     Labs reviewed with . Hospitalist consulted with STAT BMP ordered for reassessment and plan to send to ED if pt continues to trend low. Pt currently denies any symptoms of hyponatremia. Per nursing, pt has been compliant with fluid restriction. NP spoke with hospitalist after pt assessment. Per hospitalist, pt stated that he drinks water every 45 minutes out of habit. Hospitalist placed pt on strict fluid restriction of 800cc with I/o monitoring and repeat BMP for tomorrow. NP provided education to pt regarding fluid restriction for sodium levels, with pt voicing understanding.  Chart and history reviewed and discussed with treatment team. No events reported overnight. Seen with RN. Pt states that he feels his mood is "much better" and requested discharge by 3 day letter. Pt denies any AVH at this time and clarifies that his paranoia regarding the [place] mob is "chronic" and states that he feels safe to return home. Pt states that when he doesn't feel safe at home, he checks himself into the hospital, but now feels "it's time for me to go." Discussed safety planning with need to coordinate with pt's outpatient psychiatrist and housing, with pt in agreement. Pt assessed for bilateral red eyes, progressively worse from yesterday which was isolated to the L eye. Pt now reports increased in tear production, gritty feeling, crusts upon waking this morning. Cold compresses encouraged. Pt denies any allergies or other sensitivities that may have triggered this. Suspected conjunctivitis at this time with hospitalist consulted and antibiotic drops ordered. Pt remains in fluid restrictions of 1000cc at this time. No behavioral issues noted. Medication compliant, tolerating well, free of EPS. Sleep and appetite maintained. Denies SI/HI/AVH. Continue to monitor for safety.     Labs reviewed with . Hospitalist consulted with STAT BMP ordered for reassessment and plan to send to ED if pt continues to trend low. Pt currently denies any symptoms of hyponatremia. Per nursing, pt has been compliant with fluid restriction. NP spoke with hospitalist after pt assessment. Per hospitalist, pt stated that he drinks water every 45 minutes out of habit. Hospitalist placed pt on strict fluid restriction of 800cc with I/o monitoring and repeat BMP for tomorrow. NP provided education to pt regarding fluid restriction for sodium levels, with pt voicing understanding. No symptoms of hyponatremia noted or reported at this time.

## 2024-03-12 NOTE — CONSULT NOTE ADULT - ASSESSMENT
58 M with HTN, T2DM (on Metformin), HLD, CVID with hypogammaglobulinemia, schizoaffective disorder with multiple psych hospitalizations,  in treatment at [facility], and is on BELLA   presenting for evaluation of worsening auditory hallucinations. Medicine consulted for hyponatremia       # Acute on chronic  Hyponatremia-Na 125 this AM, was 132  on 3/8- pt asymptomatic - denies headache, lethagy or any other neuro deficits  Reviewed charts- Pt with history of acute on chronic hyponatremia with serum sodium as low as 118. 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  and was advised fluid restriction. Serum Osom in 11/23 was 269, urine osom was 262, not consistent with SIADH. Na improved with strict fluid resctriction  DW Psych – Advise repeat BMP, serum and urine Osom added. Advise strict fluid restriction to <1 L/day   If Na continues to downtrend, will need to be evaluated in the ED    #H/o HTN- SBP in 130s to 140s. on Labetalol 200mg Q 12,  Losartan 50mcg    # H/o Hypothyroidism- TSH 2.33. Continue Synthroid     # H/O CVID- Last infusion was on 2/29. Pt reoprts he is due for it next on 3/29    # H/o DM2- A1C of 5.6, BS within low normal to normal range. Continue to monitor

## 2024-03-12 NOTE — BH INPATIENT PSYCHIATRY PROGRESS NOTE - NSBHCHARTREVIEWVS_PSY_A_CORE FT
Vital Signs Last 24 Hrs  T(C): 36.7 (03-12-24 @ 08:22), Max: 36.7 (03-11-24 @ 19:32)  T(F): 98.1 (03-12-24 @ 08:22), Max: 98.1 (03-11-24 @ 19:32)  HR: --  BP: --  BP(mean): --  RR: 17 (03-12-24 @ 08:22) (17 - 18)  SpO2: --    Orthostatic VS  03-12-24 @ 08:22  Lying BP: --/-- HR: --  Sitting BP: 146/77 HR: 72  Standing BP: 138/76 HR: 81  Site: --  Mode: --  Orthostatic VS  03-11-24 @ 19:32  Lying BP: --/-- HR: --  Sitting BP: 168/89 HR: 70  Standing BP: 160/85 HR: 77  Site: --  Mode: --  Orthostatic VS  03-11-24 @ 08:17  Lying BP: --/-- HR: --  Sitting BP: 159/86 HR: 68  Standing BP: 163/89 HR: 74  Site: --  Mode: --  Orthostatic VS  03-10-24 @ 20:09  Lying BP: --/-- HR: --  Sitting BP: 155/83 HR: 70  Standing BP: 157/84 HR: 73  Site: --  Mode: --   Vital Signs Last 24 Hrs  T(C): 37.1 (03-13-24 @ 08:22), Max: 37.1 (03-13-24 @ 08:22)  T(F): 98.7 (03-13-24 @ 08:22), Max: 98.7 (03-13-24 @ 08:22)  HR: --  BP: --  BP(mean): --  RR: 18 (03-12-24 @ 21:20) (18 - 18)  SpO2: --    Orthostatic VS  03-13-24 @ 08:22  Lying BP: --/-- HR: --  Sitting BP: 146/79 HR: 68  Standing BP: 128/70 HR: 82  Site: --  Mode: --  Orthostatic VS  03-12-24 @ 19:39  Lying BP: --/-- HR: --  Sitting BP: 120/85 HR: 81  Standing BP: 145/85 HR: 91  Site: --  Mode: --  Orthostatic VS  03-12-24 @ 08:22  Lying BP: --/-- HR: --  Sitting BP: 146/77 HR: 72  Standing BP: 138/76 HR: 81  Site: --  Mode: --  Orthostatic VS  03-11-24 @ 19:32  Lying BP: --/-- HR: --  Sitting BP: 168/89 HR: 70  Standing BP: 160/85 HR: 77  Site: --  Mode: --

## 2024-03-13 LAB
ANION GAP SERPL CALC-SCNC: 10 MMOL/L — SIGNIFICANT CHANGE UP (ref 7–14)
BUN SERPL-MCNC: 12 MG/DL — SIGNIFICANT CHANGE UP (ref 7–23)
CALCIUM SERPL-MCNC: 9.8 MG/DL — SIGNIFICANT CHANGE UP (ref 8.4–10.5)
CHLORIDE SERPL-SCNC: 92 MMOL/L — LOW (ref 98–107)
CO2 SERPL-SCNC: 29 MMOL/L — SIGNIFICANT CHANGE UP (ref 22–31)
CREAT SERPL-MCNC: 0.82 MG/DL — SIGNIFICANT CHANGE UP (ref 0.5–1.3)
EGFR: 103 ML/MIN/1.73M2 — SIGNIFICANT CHANGE UP
GLUCOSE BLDC GLUCOMTR-MCNC: 103 MG/DL — HIGH (ref 70–99)
GLUCOSE BLDC GLUCOMTR-MCNC: 104 MG/DL — HIGH (ref 70–99)
GLUCOSE BLDC GLUCOMTR-MCNC: 104 MG/DL — HIGH (ref 70–99)
GLUCOSE BLDC GLUCOMTR-MCNC: 128 MG/DL — HIGH (ref 70–99)
GLUCOSE SERPL-MCNC: 98 MG/DL — SIGNIFICANT CHANGE UP (ref 70–99)
MAGNESIUM SERPL-MCNC: 2.2 MG/DL — SIGNIFICANT CHANGE UP (ref 1.6–2.6)
PHOSPHATE SERPL-MCNC: 3.3 MG/DL — SIGNIFICANT CHANGE UP (ref 2.5–4.5)
POTASSIUM SERPL-MCNC: 4.6 MMOL/L — SIGNIFICANT CHANGE UP (ref 3.5–5.3)
POTASSIUM SERPL-SCNC: 4.6 MMOL/L — SIGNIFICANT CHANGE UP (ref 3.5–5.3)
SODIUM SERPL-SCNC: 131 MMOL/L — LOW (ref 135–145)

## 2024-03-13 PROCEDURE — 99232 SBSQ HOSP IP/OBS MODERATE 35: CPT | Mod: FS

## 2024-03-13 RX ADMIN — LOSARTAN POTASSIUM 100 MILLIGRAM(S): 100 TABLET, FILM COATED ORAL at 09:33

## 2024-03-13 RX ADMIN — METFORMIN HYDROCHLORIDE 500 MILLIGRAM(S): 850 TABLET ORAL at 09:33

## 2024-03-13 RX ADMIN — Medication 2 DROP(S): at 09:38

## 2024-03-13 RX ADMIN — POLYETHYLENE GLYCOL 3350 17 GRAM(S): 17 POWDER, FOR SOLUTION ORAL at 09:34

## 2024-03-13 RX ADMIN — METFORMIN HYDROCHLORIDE 500 MILLIGRAM(S): 850 TABLET ORAL at 20:20

## 2024-03-13 RX ADMIN — Medication 2 DROP(S): at 12:28

## 2024-03-13 RX ADMIN — SENNA PLUS 2 TABLET(S): 8.6 TABLET ORAL at 20:20

## 2024-03-13 RX ADMIN — Medication 50 MICROGRAM(S): at 05:15

## 2024-03-13 RX ADMIN — Medication 200 MILLIGRAM(S): at 20:20

## 2024-03-13 RX ADMIN — Medication 2 DROP(S): at 16:38

## 2024-03-13 RX ADMIN — Medication 200 MILLIGRAM(S): at 09:34

## 2024-03-13 RX ADMIN — ATORVASTATIN CALCIUM 40 MILLIGRAM(S): 80 TABLET, FILM COATED ORAL at 20:20

## 2024-03-13 RX ADMIN — Medication 5 MILLIGRAM(S): at 20:20

## 2024-03-13 RX ADMIN — Medication 2 DROP(S): at 20:20

## 2024-03-13 NOTE — BH INPATIENT PSYCHIATRY PROGRESS NOTE - CURRENT MEDICATION
MEDICATIONS  (STANDING):  atorvastatin 40 milliGRAM(s) Oral at bedtime  ciprofloxacin  0.3% Ophthalmic Solution 2 Drop(s) Both EYES four times a day  dextrose 5%. 1000 milliLiter(s) (50 mL/Hr) IV Continuous <Continuous>  dextrose 5%. 1000 milliLiter(s) (100 mL/Hr) IV Continuous <Continuous>  dextrose 50% Injectable 12.5 Gram(s) IV Push once  dextrose 50% Injectable 25 Gram(s) IV Push once  dextrose 50% Injectable 25 Gram(s) IV Push once  glucagon  Injectable 1 milliGRAM(s) IntraMuscular once  insulin lispro (ADMELOG) corrective regimen sliding scale   SubCutaneous three times a day before meals  labetalol 200 milliGRAM(s) Oral every 12 hours  levothyroxine 50 MICROGram(s) Oral daily  losartan 100 milliGRAM(s) Oral daily  melatonin. 5 milliGRAM(s) Oral at bedtime  metFORMIN 500 milliGRAM(s) Oral two times a day  polyethylene glycol 3350 17 Gram(s) Oral daily  senna 2 Tablet(s) Oral at bedtime    MEDICATIONS  (PRN):  acetaminophen     Tablet .. 650 milliGRAM(s) Oral every 6 hours PRN Temp greater or equal to 38C (100.4F), Mild Pain (1 - 3), Moderate Pain (4 - 6), Severe Pain (7 - 10)  dextrose Oral Gel 15 Gram(s) Oral once PRN Blood Glucose LESS THAN 70 milliGRAM(s)/deciliter  haloperidol     Tablet 5 milliGRAM(s) Oral every 6 hours PRN agitation  haloperidol    Injectable 5 milliGRAM(s) IntraMuscular once PRN Agitation  lactulose Syrup 10 Gram(s) Oral daily PRN constipation  LORazepam     Tablet 2 milliGRAM(s) Oral every 6 hours PRN Agitation  LORazepam   Injectable 2 milliGRAM(s) IntraMuscular Once PRN Agitation  nicotine  Polacrilex Gum 2 milliGRAM(s) Oral every 2 hours PRN breakthrough cravings   MEDICATIONS  (STANDING):  atorvastatin 40 milliGRAM(s) Oral at bedtime  ciprofloxacin  0.3% Ophthalmic Solution 2 Drop(s) Both EYES four times a day  dextrose 5%. 1000 milliLiter(s) (50 mL/Hr) IV Continuous <Continuous>  dextrose 5%. 1000 milliLiter(s) (100 mL/Hr) IV Continuous <Continuous>  dextrose 50% Injectable 25 Gram(s) IV Push once  dextrose 50% Injectable 12.5 Gram(s) IV Push once  dextrose 50% Injectable 25 Gram(s) IV Push once  glucagon  Injectable 1 milliGRAM(s) IntraMuscular once  insulin lispro (ADMELOG) corrective regimen sliding scale   SubCutaneous three times a day before meals  labetalol 200 milliGRAM(s) Oral every 12 hours  levothyroxine 50 MICROGram(s) Oral daily  losartan 100 milliGRAM(s) Oral daily  melatonin. 5 milliGRAM(s) Oral at bedtime  metFORMIN 500 milliGRAM(s) Oral two times a day  polyethylene glycol 3350 17 Gram(s) Oral daily  senna 2 Tablet(s) Oral at bedtime    MEDICATIONS  (PRN):  acetaminophen     Tablet .. 650 milliGRAM(s) Oral every 6 hours PRN Temp greater or equal to 38C (100.4F), Mild Pain (1 - 3), Moderate Pain (4 - 6), Severe Pain (7 - 10)  dextrose Oral Gel 15 Gram(s) Oral once PRN Blood Glucose LESS THAN 70 milliGRAM(s)/deciliter  haloperidol     Tablet 5 milliGRAM(s) Oral every 6 hours PRN agitation  haloperidol    Injectable 5 milliGRAM(s) IntraMuscular once PRN Agitation  lactulose Syrup 10 Gram(s) Oral daily PRN constipation  LORazepam     Tablet 2 milliGRAM(s) Oral every 6 hours PRN Agitation  LORazepam   Injectable 2 milliGRAM(s) IntraMuscular Once PRN Agitation  nicotine  Polacrilex Gum 2 milliGRAM(s) Oral every 2 hours PRN breakthrough cravings   MEDICATIONS  (STANDING):  atorvastatin 40 milliGRAM(s) Oral at bedtime  ciprofloxacin  0.3% Ophthalmic Solution 2 Drop(s) Both EYES four times a day  dextrose 5%. 1000 milliLiter(s) (50 mL/Hr) IV Continuous <Continuous>  dextrose 5%. 1000 milliLiter(s) (100 mL/Hr) IV Continuous <Continuous>  dextrose 50% Injectable 25 Gram(s) IV Push once  dextrose 50% Injectable 25 Gram(s) IV Push once  dextrose 50% Injectable 12.5 Gram(s) IV Push once  glucagon  Injectable 1 milliGRAM(s) IntraMuscular once  insulin lispro (ADMELOG) corrective regimen sliding scale   SubCutaneous three times a day before meals  labetalol 200 milliGRAM(s) Oral every 12 hours  levothyroxine 50 MICROGram(s) Oral daily  losartan 100 milliGRAM(s) Oral daily  melatonin. 5 milliGRAM(s) Oral at bedtime  metFORMIN 500 milliGRAM(s) Oral two times a day  polyethylene glycol 3350 17 Gram(s) Oral daily  senna 2 Tablet(s) Oral at bedtime    MEDICATIONS  (PRN):  acetaminophen     Tablet .. 650 milliGRAM(s) Oral every 6 hours PRN Temp greater or equal to 38C (100.4F), Mild Pain (1 - 3), Moderate Pain (4 - 6), Severe Pain (7 - 10)  dextrose Oral Gel 15 Gram(s) Oral once PRN Blood Glucose LESS THAN 70 milliGRAM(s)/deciliter  haloperidol     Tablet 5 milliGRAM(s) Oral every 6 hours PRN agitation  haloperidol    Injectable 5 milliGRAM(s) IntraMuscular once PRN Agitation  lactulose Syrup 10 Gram(s) Oral daily PRN constipation  LORazepam     Tablet 2 milliGRAM(s) Oral every 6 hours PRN Agitation  LORazepam   Injectable 2 milliGRAM(s) IntraMuscular Once PRN Agitation  nicotine  Polacrilex Gum 2 milliGRAM(s) Oral every 2 hours PRN breakthrough cravings

## 2024-03-13 NOTE — BH INPATIENT PSYCHIATRY PROGRESS NOTE - NSBHMETABOLIC_PSY_ALL_CORE_FT
BMI: BMI (kg/m2): 33.4 (03-08-24 @ 14:52)  HbA1c: A1C with Estimated Average Glucose Result: 5.6 % (03-09-24 @ 09:43)    Glucose: POCT Blood Glucose.: 104 mg/dL (03-13-24 @ 11:05)    BP: 141/77 (03-10-24 @ 23:18) (141/77 - 141/77)Vital Signs Last 24 Hrs  T(C): 37.1 (03-13-24 @ 08:22), Max: 37.1 (03-13-24 @ 08:22)  T(F): 98.7 (03-13-24 @ 08:22), Max: 98.7 (03-13-24 @ 08:22)  HR: --  BP: --  BP(mean): --  RR: 18 (03-12-24 @ 21:20) (18 - 18)  SpO2: --    Orthostatic VS  03-13-24 @ 08:22  Lying BP: --/-- HR: --  Sitting BP: 146/79 HR: 68  Standing BP: 128/70 HR: 82  Site: --  Mode: --  Orthostatic VS  03-12-24 @ 19:39  Lying BP: --/-- HR: --  Sitting BP: 120/85 HR: 81  Standing BP: 145/85 HR: 91  Site: --  Mode: --  Orthostatic VS  03-12-24 @ 08:22  Lying BP: --/-- HR: --  Sitting BP: 146/77 HR: 72  Standing BP: 138/76 HR: 81  Site: --  Mode: --  Orthostatic VS  03-11-24 @ 19:32  Lying BP: --/-- HR: --  Sitting BP: 168/89 HR: 70  Standing BP: 160/85 HR: 77  Site: --  Mode: --    Lipid Panel: Date/Time: 03-09-24 @ 09:43  Cholesterol, Serum: 187  LDL Cholesterol Calculated: 123  HDL Cholesterol, Serum: 37  Total Cholesterol/HDL Ration Measurement: --  Triglycerides, Serum: 135   BMI: BMI (kg/m2): 33.4 (03-08-24 @ 14:52)  HbA1c: A1C with Estimated Average Glucose Result: 5.6 % (03-09-24 @ 09:43)    Glucose: POCT Blood Glucose.: 107 mg/dL (03-14-24 @ 07:50)    BP: --Vital Signs Last 24 Hrs  T(C): 36.1 (03-14-24 @ 08:53), Max: 36.6 (03-13-24 @ 19:30)  T(F): 97 (03-14-24 @ 08:53), Max: 97.9 (03-13-24 @ 19:30)  HR: --  BP: --  BP(mean): --  RR: 16 (03-14-24 @ 08:53) (16 - 18)  SpO2: --    Orthostatic VS  03-14-24 @ 08:53  Lying BP: --/-- HR: --  Sitting BP: 140/83 HR: 79  Standing BP: 144/81 HR: 87  Site: upper left arm  Mode: electronic  Orthostatic VS  03-13-24 @ 19:30  Lying BP: --/-- HR: --  Sitting BP: 154/83 HR: 67  Standing BP: 141/75 HR: 74  Site: --  Mode: --  Orthostatic VS  03-13-24 @ 08:22  Lying BP: --/-- HR: --  Sitting BP: 146/79 HR: 68  Standing BP: 128/70 HR: 82  Site: --  Mode: --  Orthostatic VS  03-12-24 @ 19:39  Lying BP: --/-- HR: --  Sitting BP: 120/85 HR: 81  Standing BP: 145/85 HR: 91  Site: --  Mode: --    Lipid Panel: Date/Time: 03-09-24 @ 09:43  Cholesterol, Serum: 187  LDL Cholesterol Calculated: 123  HDL Cholesterol, Serum: 37  Total Cholesterol/HDL Ration Measurement: --  Triglycerides, Serum: 135

## 2024-03-13 NOTE — BH INPATIENT PSYCHIATRY PROGRESS NOTE - NSBHCHARTREVIEWVS_PSY_A_CORE FT
Vital Signs Last 24 Hrs  T(C): 37.1 (03-13-24 @ 08:22), Max: 37.1 (03-13-24 @ 08:22)  T(F): 98.7 (03-13-24 @ 08:22), Max: 98.7 (03-13-24 @ 08:22)  HR: --  BP: --  BP(mean): --  RR: 18 (03-12-24 @ 21:20) (18 - 18)  SpO2: --    Orthostatic VS  03-13-24 @ 08:22  Lying BP: --/-- HR: --  Sitting BP: 146/79 HR: 68  Standing BP: 128/70 HR: 82  Site: --  Mode: --  Orthostatic VS  03-12-24 @ 19:39  Lying BP: --/-- HR: --  Sitting BP: 120/85 HR: 81  Standing BP: 145/85 HR: 91  Site: --  Mode: --  Orthostatic VS  03-12-24 @ 08:22  Lying BP: --/-- HR: --  Sitting BP: 146/77 HR: 72  Standing BP: 138/76 HR: 81  Site: --  Mode: --  Orthostatic VS  03-11-24 @ 19:32  Lying BP: --/-- HR: --  Sitting BP: 168/89 HR: 70  Standing BP: 160/85 HR: 77  Site: --  Mode: --   Vital Signs Last 24 Hrs  T(C): 36.1 (03-14-24 @ 08:53), Max: 36.6 (03-13-24 @ 19:30)  T(F): 97 (03-14-24 @ 08:53), Max: 97.9 (03-13-24 @ 19:30)  HR: --  BP: --  BP(mean): --  RR: 16 (03-14-24 @ 08:53) (16 - 18)  SpO2: --    Orthostatic VS  03-14-24 @ 08:53  Lying BP: --/-- HR: --  Sitting BP: 140/83 HR: 79  Standing BP: 144/81 HR: 87  Site: upper left arm  Mode: electronic  Orthostatic VS  03-13-24 @ 19:30  Lying BP: --/-- HR: --  Sitting BP: 154/83 HR: 67  Standing BP: 141/75 HR: 74  Site: --  Mode: --  Orthostatic VS  03-13-24 @ 08:22  Lying BP: --/-- HR: --  Sitting BP: 146/79 HR: 68  Standing BP: 128/70 HR: 82  Site: --  Mode: --  Orthostatic VS  03-12-24 @ 19:39  Lying BP: --/-- HR: --  Sitting BP: 120/85 HR: 81  Standing BP: 145/85 HR: 91  Site: --  Mode: --

## 2024-03-13 NOTE — BH INPATIENT PSYCHIATRY PROGRESS NOTE - ATTENDING COMMENTS
Chart was reviewed and case discussed with the Psych NP. I agree with the assessment and plan as documented in the Psych NP's progress note and was directly involved in medical decision making.   Pt submitted 3DL. Pt sodium lower than upon admission (125), remains asymptomatic. On fluid restriction, medicine consulted and to follow.  Chart was reviewed and case discussed with the Psych NP. I agree with the assessment and plan as documented in the Psych NP's progress note and was directly involved in medical decision making.
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
## 2024-03-14 LAB
GLUCOSE BLDC GLUCOMTR-MCNC: 107 MG/DL — HIGH (ref 70–99)
GLUCOSE BLDC GLUCOMTR-MCNC: 108 MG/DL — HIGH (ref 70–99)
GLUCOSE BLDC GLUCOMTR-MCNC: 121 MG/DL — HIGH (ref 70–99)

## 2024-03-14 PROCEDURE — 99232 SBSQ HOSP IP/OBS MODERATE 35: CPT

## 2024-03-14 PROCEDURE — 99232 SBSQ HOSP IP/OBS MODERATE 35: CPT | Mod: FS

## 2024-03-14 RX ORDER — LOSARTAN POTASSIUM 100 MG/1
1 TABLET, FILM COATED ORAL
Qty: 30 | Refills: 0
Start: 2024-03-14 | End: 2024-04-12

## 2024-03-14 RX ORDER — CIPROFLOXACIN HCL 0.3 %
2 DROPS OPHTHALMIC (EYE)
Qty: 1 | Refills: 0
Start: 2024-03-14

## 2024-03-14 RX ORDER — LEVOTHYROXINE SODIUM 125 MCG
1 TABLET ORAL
Refills: 0 | DISCHARGE

## 2024-03-14 RX ORDER — LABETALOL HCL 100 MG
1 TABLET ORAL
Qty: 60 | Refills: 0
Start: 2024-03-14 | End: 2024-04-12

## 2024-03-14 RX ORDER — FLUTICASONE FUROATE, UMECLIDINIUM BROMIDE AND VILANTEROL TRIFENATATE 200; 62.5; 25 UG/1; UG/1; UG/1
1 POWDER RESPIRATORY (INHALATION)
Refills: 0 | DISCHARGE

## 2024-03-14 RX ORDER — LEVOTHYROXINE SODIUM 125 MCG
1 TABLET ORAL
Qty: 30 | Refills: 0
Start: 2024-03-14 | End: 2024-04-12

## 2024-03-14 RX ORDER — METFORMIN HYDROCHLORIDE 850 MG/1
1 TABLET ORAL
Qty: 60 | Refills: 0
Start: 2024-03-14 | End: 2024-04-12

## 2024-03-14 RX ORDER — ROSUVASTATIN CALCIUM 5 MG/1
1 TABLET ORAL
Qty: 0 | Refills: 0 | DISCHARGE

## 2024-03-14 RX ORDER — LABETALOL HCL 100 MG
1 TABLET ORAL
Refills: 0 | DISCHARGE

## 2024-03-14 RX ORDER — METFORMIN HYDROCHLORIDE 850 MG/1
1 TABLET ORAL
Refills: 0 | DISCHARGE

## 2024-03-14 RX ORDER — IRBESARTAN 75 MG/1
1 TABLET ORAL
Refills: 0 | DISCHARGE

## 2024-03-14 RX ORDER — ATORVASTATIN CALCIUM 80 MG/1
1 TABLET, FILM COATED ORAL
Qty: 30 | Refills: 0
Start: 2024-03-14 | End: 2024-04-12

## 2024-03-14 RX ADMIN — Medication 2 DROP(S): at 16:30

## 2024-03-14 RX ADMIN — ATORVASTATIN CALCIUM 40 MILLIGRAM(S): 80 TABLET, FILM COATED ORAL at 20:47

## 2024-03-14 RX ADMIN — Medication 200 MILLIGRAM(S): at 08:46

## 2024-03-14 RX ADMIN — POLYETHYLENE GLYCOL 3350 17 GRAM(S): 17 POWDER, FOR SOLUTION ORAL at 09:41

## 2024-03-14 RX ADMIN — LOSARTAN POTASSIUM 100 MILLIGRAM(S): 100 TABLET, FILM COATED ORAL at 08:46

## 2024-03-14 RX ADMIN — METFORMIN HYDROCHLORIDE 500 MILLIGRAM(S): 850 TABLET ORAL at 08:45

## 2024-03-14 RX ADMIN — Medication 2 DROP(S): at 08:46

## 2024-03-14 RX ADMIN — METFORMIN HYDROCHLORIDE 500 MILLIGRAM(S): 850 TABLET ORAL at 20:47

## 2024-03-14 RX ADMIN — SENNA PLUS 2 TABLET(S): 8.6 TABLET ORAL at 20:47

## 2024-03-14 RX ADMIN — Medication 200 MILLIGRAM(S): at 20:47

## 2024-03-14 RX ADMIN — Medication 50 MICROGRAM(S): at 05:33

## 2024-03-14 RX ADMIN — Medication 5 MILLIGRAM(S): at 20:47

## 2024-03-14 NOTE — BH INPATIENT PSYCHIATRY DISCHARGE NOTE - NSBHFUPINTERVALHXFT_PSY_A_CORE
Pt has continued to show improvement in symptoms. Pt states that depression is much improved, denying anxiety. On day of discharge, pt denies SI/HI/AVH and behavior has been well controlled. Pt states that paranoia is chronic and baseline, confirmed by outpatient psychiatrist. Pt reports that sleep and appetite have returned to baseline. Pt has been compliant with medications, and is in agreement to continue care in the outpatient setting. Pt is able to safety plan appropriately. Pt's risk cannot be further reduced with continued inpatient hospitalization. Pt is no longer an acute danger to self or others, and is stable for discharge home.

## 2024-03-14 NOTE — BH INPATIENT PSYCHIATRY PROGRESS NOTE - PRN MEDS
MEDICATIONS  (PRN):  acetaminophen     Tablet .. 650 milliGRAM(s) Oral every 6 hours PRN Temp greater or equal to 38C (100.4F), Mild Pain (1 - 3), Moderate Pain (4 - 6), Severe Pain (7 - 10)  dextrose Oral Gel 15 Gram(s) Oral once PRN Blood Glucose LESS THAN 70 milliGRAM(s)/deciliter  haloperidol     Tablet 5 milliGRAM(s) Oral every 6 hours PRN agitation  haloperidol    Injectable 5 milliGRAM(s) IntraMuscular once PRN Agitation  lactulose Syrup 10 Gram(s) Oral daily PRN constipation  LORazepam     Tablet 2 milliGRAM(s) Oral every 6 hours PRN Agitation  LORazepam   Injectable 2 milliGRAM(s) IntraMuscular Once PRN Agitation  nicotine  Polacrilex Gum 2 milliGRAM(s) Oral every 2 hours PRN breakthrough cravings  

## 2024-03-14 NOTE — BH INPATIENT PSYCHIATRY DISCHARGE NOTE - HOSPITAL COURSE
On initial assessment, pt was guarded, minimal interaction with staff, citing +AH not command in nature and paranoid ideation regarding individuals (particularly the [place] mob) targeting him. Pt cited hearing noises of construction and muffled voices at his home, though did not confirm with anyone from his housing if there was indeed construction happening. Pt stated that he came to the hospital to seek quiet and peace, away from those who were out to get him. Pt's only psychiatric medication on admission was abilify maintena 400mg, due 3/15, with pt in agreement to receive BELLA early (3/9/24) due to increase in psychotic symptoms. Pt was in agreement and outpatient psychiatrist notified of new administration date. With the BELLA, pt reported a reduction in +AH  though did state that his paranoid ideation was chronic in nature, confirmed by his outpatient psychiatrist (though stated that his ideation is typically focused on the police vs the [place] mob). After a few days, pt stated that he felt safe to return home, denying +AH and able to safety plan appropriately. Pt submitted 3 day letter and discharge was granted.     During hospitalization, pt's blood pressure was monitored, trending down to 140s systolic, while initially elevated on arrival in the 170s systolic. During hospitalization, pt reported mild h/a relieved by tylenol, only reported or noted side effect of hypertension.     Pt was followed by hospitalist due to mild asymptomatic hyponatremia during admission, chronic in nature. Pt was initially placed on 1L fluid restriction due to Na level of 131 upon arrival. Labs were redrawn, noted a drop in Na to 125. pt was found to not be compliant with the fluid restriction, drinking from the bathroom sink "every 45 minutes." Pt was educated on the importance of fluid restriction by staff, more compliant, with Na increasing to 132 prior to discharge.     Pt was noted to have worsening red eyes during hospitalization, first in his L eye, then progressing bilaterally. Pt reported watery eyes, itching, grittiness, and difficulty opening them after naps. Hot compresses and hand hygiene were encouraged, and antibiotic eye drops were prescribed with good effect for suspected conjunctivitis, seeing a reduction in redness within 24 hours.

## 2024-03-14 NOTE — BH INPATIENT PSYCHIATRY DISCHARGE NOTE - NSBHDCMEDICALFT_PSY_A_CORE
follow up with your doctor for CVID, next infusion due 3/29.  labs continuously assessed due to chronic hyponatremia at pt's baseline, with Na trending 125-132 during hospitalization. Pt initially on 1000cc fluid restrictions, then found to be drinking water from the bathroom faucet, resulting in Na dropping to 125. While continuously asymptomatic, pt placed on 800cc fluid restrictions, resulting in Na to increase to 132 prior to discharge.    follow up with your doctor for CVID, next infusion due 3/29.  labs continuously assessed due to chronic hyponatremia at pt's baseline, with Na trending 125-132 during hospitalization. Pt initially on 1000cc fluid restrictions, then found to be drinking water from the bathroom faucet, resulting in Na dropping to 125. While continuously asymptomatic, pt placed on 800cc fluid restrictions, resulting in Na to increase to 132 prior to discharge.   pt reported symptoms of conjunctivitis while hospitalized, started on ciprofloxacin 0.3% eye drops with good effect.

## 2024-03-14 NOTE — BH INPATIENT PSYCHIATRY PROGRESS NOTE - NSBHASSESSSUMMFT_PSY_ALL_CORE
58M, domiciled in supportive housing TSI, SSD, single, PMH HTN, DM, hypogammaglobulinemia, PPHx schizoaffective d/o, bipolar d/o, hx of multiple psych hospitalizations (last known in 2020 at [facility]), denies hx SA/NSSIB, pt in treatment at [facility] with DR. Cook , and is on BELLA , denies drug or alcohol use, denies legal hx;   pt bib self for  ah , si/hi.     Treatment Plan  1. admitted to unit, legal status 9.13  2. safety  - routine checks as pt denies SIIP/HIIP and is able to contract for safety  - haldol and ativan PO/IM PRN for agitation and anxiety  3. psychiatric  - abilify maintana 400mg BELLA (administer early 3/9 instead of 3/15)  - past trials:   4. medical  - DM: metformin 500mg BID with sliding scale  - hypothyroidism: levothyroxine 50mcg  - HTN: labetalol 200mg BID and losartan 100mg   - hyperlipidemia: atorvastatin 40mg   - common variable immunodeficiency   - mild hyponatremia: fluid restriction of 800cc  - conjunctivitis: ciprofloxacin 0.3mg% opthalmic solution 2 drips Q4h when awake for 5 days (started 3/12)  5. encourage I/G/M therapy  6. dispo  - continue with [facility] DRAKE Cook
58M, domiciled in supportive housing TSI, SSD, single, PMH HTN, DM, hypogammaglobulinemia, PPHx schizoaffective d/o, bipolar d/o, hx of multiple psych hospitalizations (last known in 2020 at [facility]), denies hx SA/NSSIB, pt in treatment at [facility] with DR. Cook , and is on BELLA , denies drug or alcohol use, denies legal hx;   pt bib self for  ah , si/hi.     Treatment Plan  1. admitted to unit, legal status 9.13  2. safety  - routine checks as pt denies SIIP/HIIP and is able to contract for safety  - haldol and ativan PO/IM PRN for agitation and anxiety  3. psychiatric  - abilify maintana 400mg BELLA (administer early 3/9 instead of 3/15)  - past trials:   4. medical  - DM: metformin 500mg BID with sliding scale  - hypothyroidism: levothyroxine 50mcg  - HTN: labetalol 200mg BID and losartan 100mg   - hyperlipidemia: atorvastatin 40mg   - common variable immunodeficiency   - mild hyponatremia: fluid restriction of 800cc  - conjunctivitis: ciprofloxacin 0.3mg% opthalmic solution 2 drips Q4h when awake for 5 days (started 3/12)  5. encourage I/G/M therapy  6. dispo  - continue with [facility] DRAKE Cook
58M, domiciled in supportive housing TSI, SSD, single, PMH HTN, DM, hypogammaglobulinemia, PPHx schizoaffective d/o, bipolar d/o, hx of multiple psych hospitalizations (last known in 2020 at [facility]), denies hx SA/NSSIB, pt in treatment at [facility] with DR. Cook , and is on BELLA , denies drug or alcohol use, denies legal hx;   pt bib self for  ah , si/hi.     Treatment Plan  1. admitted to unit, legal status 9.13  2. safety  - routine checks as pt denies SIIP/HIIP and is able to contract for safety  - haldol and ativan PO/IM PRN for agitation and anxiety  3. psychiatric  - abilify maintana 400mg BELLA (administer early 3/9 instead of 3/15)  - past trials:   4. medical  - DM: metformin 500mg BID with sliding scale  - hypothyroidism: levothyroxine 50mcg  - HTN: labetalol 200mg BID and losartan 100mg   - hyperlipidemia: atorvastatin 40mg   - common variable immunodeficiency   - mild hyponatremia: fluid restriction of 1L  - conjunctivitis: ciprofloxacin 0.3mg% opthalmic solution 2 drips Q4h when awake for 5 days (started 3/12)  5. encourage I/G/M therapy  6. dispo  - continue with [facility] DRAKE Cook  CGI <2 return to baseline functioning as evidenced by behavioral control, staff observation, and pt self report  
58M, domiciled in supportive housing TSI, SSD, single, PMH HTN, DM, hypogammaglobulinemia, PPHx schizoaffective d/o, bipolar d/o, hx of multiple psych hospitalizations (last known in 2020 at [facility]), denies hx SA/NSSIB, pt in treatment at [facility] with DR. Cook , and is on BELLA , denies drug or alcohol use, denies legal hx;   pt bib self for  ah , si/hi.   on initial assessment, pt presents with psychotic symptoms of paranoia and AH, not command in nature, for an undisclosed period of time. Pt states that his paranoid symptoms have caused an increase in depression and isolation, stating that he wants everyone to "leave him alone." Discussed administering abilify maintana early due to increase in psychotic symptoms, with pt voicing agreement. Email sent to pt's psychiatrist for collateral.     Treatment Plan  1. admitted to unit, legal status 9.13  2. safety  - routine checks as pt denies SIIP/HIIP and is able to contract for safety  - haldol and ativan PO/IM PRN for agitation and anxiety  3. psychiatric  - abilify maintana 400mg BELLA (administer early 3/9 instead of 3/15)  - past trials:   4. medical  - DM: metformin 500mg BID with sliding scale  - hypothyroidism: levothyroxine 50mcg  - HTN: labetalol 200mg BID and losartan 100mg   - hyperlipidemia: atorvastatin 40mg   - common variable immunodeficiency   - mild hyponatremia: fluid restriction of 1L  5. encourage I/G/M therapy  6. dispo  - continue with [facility] AOPRASHANTH Cook  CGI <2 return to baseline functioning as evidenced by behavioral control, staff observation, and pt self report  
58M, domiciled in supportive housing TSI, SSD, single, PMH HTN, DM, hypogammaglobulinemia, PPHx schizoaffective d/o, bipolar d/o, hx of multiple psych hospitalizations (last known in 2020 at [facility]), denies hx SA/NSSIB, pt in treatment at [facility] with DR. Cook , and is on BELLA , denies drug or alcohol use, denies legal hx;   pt bib self for  ah , si/hi.     Treatment Plan  1. admitted to unit, legal status 9.13  2. safety  - routine checks as pt denies SIIP/HIIP and is able to contract for safety  - haldol and ativan PO/IM PRN for agitation and anxiety  3. psychiatric  - abilify maintana 400mg BELLA (administer early 3/9 instead of 3/15)  - past trials:   4. medical  - DM: metformin 500mg BID with sliding scale  - hypothyroidism: levothyroxine 50mcg  - HTN: labetalol 200mg BID and losartan 100mg   - hyperlipidemia: atorvastatin 40mg   - common variable immunodeficiency   - mild hyponatremia: fluid restriction of 1L  5. encourage I/G/M therapy  6. dispo  - continue with [facility] DRAKE Cook  CGI <2 return to baseline functioning as evidenced by behavioral control, staff observation, and pt self report  
58M, domiciled in supportive housing TSI, SSD, single, PMH HTN, DM, hypogammaglobulinemia, PPHx schizoaffective d/o, bipolar d/o, hx of multiple psych hospitalizations (last known in 2020 at [facility]), denies hx SA/NSSIB, pt in treatment at [facility] with DR. Cook , and is on BELLA , denies drug or alcohol use, denies legal hx;   pt bib self for  ah , si/hi.   on initial assessment, pt presents with psychotic symptoms of paranoia and AH, not command in nature, for an undisclosed period of time. Pt states that his paranoid symptoms have caused an increase in depression and isolation, stating that he wants everyone to "leave him alone." Discussed administering abilify maintana early due to increase in psychotic symptoms, with pt voicing agreement. Email sent to pt's psychiatrist for collateral.     Treatment Plan  1. admitted to unit, legal status 9.13  2. safety  - routine checks as pt denies SIIP/HIIP and is able to contract for safety  - haldol and ativan PO/IM PRN for agitation and anxiety  3. psychiatric  - abilify maintana 400mg BELLA (administer early 3/9 instead of 3/15)  - past trials:   4. medical  - DM: metformin 500mg BID with sliding scale  - hypothyroidism: levothyroxine 50mcg  - HTN: labetalol 200mg BID and losartan 100mg   - hyperlipidemia: atorvastatin 40mg   - common variable immunodeficiency   - mild hyponatremia: fluid restriction of 1L  5. encourage I/G/M therapy  6. dispo  - continue with [facility] AOPRASHANTH Cook  CGI <2 return to baseline functioning as evidenced by behavioral control, staff observation, and pt self report

## 2024-03-14 NOTE — BH INPATIENT PSYCHIATRY DISCHARGE NOTE - NSBHMETABOLIC_PSY_ALL_CORE_FT
BMI: BMI (kg/m2): 33.4 (03-08-24 @ 14:52)  HbA1c: A1C with Estimated Average Glucose Result: 5.6 % (03-09-24 @ 09:43)    Glucose: POCT Blood Glucose.: 108 mg/dL (03-14-24 @ 11:41)    BP: --Vital Signs Last 24 Hrs  T(C): 36.1 (03-14-24 @ 08:53), Max: 36.6 (03-13-24 @ 19:30)  T(F): 97 (03-14-24 @ 08:53), Max: 97.9 (03-13-24 @ 19:30)  HR: --  BP: --  BP(mean): --  RR: 16 (03-14-24 @ 08:53) (16 - 18)  SpO2: --    Orthostatic VS  03-14-24 @ 08:53  Lying BP: --/-- HR: --  Sitting BP: 140/83 HR: 79  Standing BP: 144/81 HR: 87  Site: upper left arm  Mode: electronic  Orthostatic VS  03-13-24 @ 19:30  Lying BP: --/-- HR: --  Sitting BP: 154/83 HR: 67  Standing BP: 141/75 HR: 74  Site: --  Mode: --  Orthostatic VS  03-13-24 @ 08:22  Lying BP: --/-- HR: --  Sitting BP: 146/79 HR: 68  Standing BP: 128/70 HR: 82  Site: --  Mode: --  Orthostatic VS  03-12-24 @ 19:39  Lying BP: --/-- HR: --  Sitting BP: 120/85 HR: 81  Standing BP: 145/85 HR: 91  Site: --  Mode: --    Lipid Panel: Date/Time: 03-09-24 @ 09:43  Cholesterol, Serum: 187  LDL Cholesterol Calculated: 123  HDL Cholesterol, Serum: 37  Total Cholesterol/HDL Ration Measurement: --  Triglycerides, Serum: 135

## 2024-03-14 NOTE — BH INPATIENT PSYCHIATRY PROGRESS NOTE - ATTENDING COMMENTS
Chart was reviewed and case discussed with the Psych NP. I agree with the assessment and plan as documented in the Psych NP's progress note and was directly involved in medical decision making.  Chart was reviewed and case discussed with the Psych NP. I agree with the assessment and plan as documented in the Psych NP's progress note and was directly involved in medical decision making.   Pt denies AVH/SI/HI, future oriented, denies sxs of hyponatremia on fluid restriction. looking forward to discharge

## 2024-03-14 NOTE — BH INPATIENT PSYCHIATRY PROGRESS NOTE - NSBHMSEPERCEPT_PSY_A_CORE
No abnormalities
Auditory hallucinations
No abnormalities

## 2024-03-14 NOTE — BH INPATIENT PSYCHIATRY PROGRESS NOTE - NSBHFUPINTERVALCCFT_PSY_A_CORE
Patient seen for follow up for depression and psychosis.

## 2024-03-14 NOTE — BH INPATIENT PSYCHIATRY PROGRESS NOTE - NSICDXBHSECONDARYDX_PSY_ALL_CORE
HTN (hypertension)   I10  DM (diabetes mellitus)   E11.9  Common variable immunodeficiency   D83.9  
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
## 2024-03-14 NOTE — BH INPATIENT PSYCHIATRY DISCHARGE NOTE - NSBHSUICIDESTATUS_PSY_ALL_CORE
pt does not have a known hx of SI/SA/NSSIB, reported passive SI upon admission, now denies SI/SA at time of discharge.

## 2024-03-14 NOTE — BH INPATIENT PSYCHIATRY PROGRESS NOTE - NSBHMETABOLIC_PSY_ALL_CORE_FT
BMI: BMI (kg/m2): 33.4 (03-08-24 @ 14:52)  HbA1c: A1C with Estimated Average Glucose Result: 5.6 % (03-09-24 @ 09:43)    Glucose: POCT Blood Glucose.: 107 mg/dL (03-14-24 @ 07:50)    BP: --Vital Signs Last 24 Hrs  T(C): 36.1 (03-14-24 @ 08:53), Max: 36.6 (03-13-24 @ 19:30)  T(F): 97 (03-14-24 @ 08:53), Max: 97.9 (03-13-24 @ 19:30)  HR: --  BP: --  BP(mean): --  RR: 16 (03-14-24 @ 08:53) (16 - 18)  SpO2: --    Orthostatic VS  03-14-24 @ 08:53  Lying BP: --/-- HR: --  Sitting BP: 140/83 HR: 79  Standing BP: 144/81 HR: 87  Site: upper left arm  Mode: electronic  Orthostatic VS  03-13-24 @ 19:30  Lying BP: --/-- HR: --  Sitting BP: 154/83 HR: 67  Standing BP: 141/75 HR: 74  Site: --  Mode: --  Orthostatic VS  03-13-24 @ 08:22  Lying BP: --/-- HR: --  Sitting BP: 146/79 HR: 68  Standing BP: 128/70 HR: 82  Site: --  Mode: --  Orthostatic VS  03-12-24 @ 19:39  Lying BP: --/-- HR: --  Sitting BP: 120/85 HR: 81  Standing BP: 145/85 HR: 91  Site: --  Mode: --    Lipid Panel: Date/Time: 03-09-24 @ 09:43  Cholesterol, Serum: 187  LDL Cholesterol Calculated: 123  HDL Cholesterol, Serum: 37  Total Cholesterol/HDL Ration Measurement: --  Triglycerides, Serum: 135   BMI: BMI (kg/m2): 33.4 (03-08-24 @ 14:52)  HbA1c: A1C with Estimated Average Glucose Result: 5.6 % (03-09-24 @ 09:43)    Glucose: POCT Blood Glucose.: 107 mg/dL (03-15-24 @ 07:20)    BP: --Vital Signs Last 24 Hrs  T(C): 36.5 (03-15-24 @ 08:18), Max: 36.5 (03-15-24 @ 08:18)  T(F): 97.7 (03-15-24 @ 08:18), Max: 97.7 (03-15-24 @ 08:18)  HR: --  BP: --  BP(mean): --  RR: 16 (03-15-24 @ 08:18) (16 - 16)  SpO2: --    Orthostatic VS  03-15-24 @ 08:18  Lying BP: --/-- HR: --  Sitting BP: 150/84 HR: 70  Standing BP: 134/75 HR: 87  Site: upper left arm  Mode: electronic  Orthostatic VS  03-14-24 @ 19:52  Lying BP: --/-- HR: --  Sitting BP: 155/73 HR: 70  Standing BP: 136/67 HR: 77  Site: --  Mode: --  Orthostatic VS  03-14-24 @ 08:53  Lying BP: --/-- HR: --  Sitting BP: 140/83 HR: 79  Standing BP: 144/81 HR: 87  Site: upper left arm  Mode: electronic  Orthostatic VS  03-13-24 @ 19:30  Lying BP: --/-- HR: --  Sitting BP: 154/83 HR: 67  Standing BP: 141/75 HR: 74  Site: --  Mode: --    Lipid Panel: Date/Time: 03-09-24 @ 09:43  Cholesterol, Serum: 187  LDL Cholesterol Calculated: 123  HDL Cholesterol, Serum: 37  Total Cholesterol/HDL Ration Measurement: --  Triglycerides, Serum: 135

## 2024-03-14 NOTE — BH INPATIENT PSYCHIATRY DISCHARGE NOTE - NSBHASSESSSUMMFT_PSY_ALL_CORE
On day of discharge, pt encouraged to continue medication regiment. Pt educated on importance of monitoring for worsening symptoms, and to call 911 or go to the nearest emergency department if not feeling safe in the community. Pt provided with numbers to [facility]icide [facility] and [facility] and educated on their use. Pt voiced understanding to education. Pt was provided with one month supply of medication and a follow up appointment with outpatient psychiatric services. Pt reported feeling safe for discharge and to continue treatment with [facility].    Pt is at a chronic risk to self injury due to, but not limited by: serious mental illness, serious medical illness. Protective factors include: housing, CM, consistent outpatient treatment, no known hx of SI/SA/NSSIB, BELLA.

## 2024-03-14 NOTE — BH INPATIENT PSYCHIATRY PROGRESS NOTE - NSBHATTESTBILLING_PSY_A_CORE
84491-Fjjkiyxcsf OBS or IP - moderate complexity OR 35-49 mins
16731-Cktytjviqh OBS or IP - moderate complexity OR 35-49 mins
79966-Qqnscblwfw OBS or IP - low complexity OR 25-34 mins
85471-Sfzfnpcgaz OBS or IP - low complexity OR 25-34 mins
43791-Bdyrczvguu OBS or IP - moderate complexity OR 35-49 mins
78575-Oszcegvhnq OBS or IP - moderate complexity OR 35-49 mins

## 2024-03-14 NOTE — BH INPATIENT PSYCHIATRY DISCHARGE NOTE - NSDCPROCEDURESFT_PSY_ALL_CORE
labs continuously assessed due to chronic hyponatremia at pt's baseline, with Na trending 125-132 during hospitalization, on fluid restrictions.

## 2024-03-14 NOTE — BH INPATIENT PSYCHIATRY PROGRESS NOTE - NSBHMSEGAIT_PSY_A_CORE
Unable to assess
Normal gait / station
Unable to assess
Normal gait / station

## 2024-03-14 NOTE — BH INPATIENT PSYCHIATRY DISCHARGE NOTE - NSDCCPCAREPLAN_GEN_ALL_CORE_FT
PRINCIPAL DISCHARGE DIAGNOSIS  Diagnosis: Schizoaffective disorder  Assessment and Plan of Treatment:       SECONDARY DISCHARGE DIAGNOSES  Diagnosis: DM (diabetes mellitus)  Assessment and Plan of Treatment:     Diagnosis: Common variable immunodeficiency  Assessment and Plan of Treatment:     Diagnosis: Hyperlipidemia  Assessment and Plan of Treatment:     Diagnosis: HTN (hypertension)  Assessment and Plan of Treatment:

## 2024-03-14 NOTE — BH INPATIENT PSYCHIATRY PROGRESS NOTE - NSBHATTESTTYPEVISIT_PSY_A_CORE
On-site Attending supervising ANA (99XXX codes)
ANA without on-site Attending supervision
On-site Attending supervising ANA (99XXX codes)
On-site Attending supervising ANA (99XXX codes)
ANA without on-site Attending supervision
On-site Attending supervising ANA (99XXX codes)

## 2024-03-14 NOTE — BH INPATIENT PSYCHIATRY DISCHARGE NOTE - NSDCMRMEDTOKEN_GEN_ALL_CORE_FT
Abilify Maintena 400 mg intramuscular injection, extended release: 400 milligram(s) intramuscularly every 4 weeks next dose due 4/5  atorvastatin 40 mg oral tablet: 1 tab(s) orally once a day (at bedtime)  ciprofloxacin 0.3% ophthalmic solution: 2 drop(s) to each affected eye 4 times a day stop eye drops after Rei 3/17  labetalol 200 mg oral tablet: 1 tab(s) orally 2 times a day  levothyroxine 50 mcg (0.05 mg) oral tablet: 1 tab(s) orally once a day  losartan 100 mg oral tablet: 1 tab(s) orally once a day  metFORMIN 500 mg oral tablet: 1 tab(s) orally 2 times a day

## 2024-03-14 NOTE — BH INPATIENT PSYCHIATRY DISCHARGE NOTE - NSBHDCHANDOFFFT_PSY_ALL_CORE
email sent to pt's [facility] psychiatrist, Dr. Hollie Cook, with course of stay, medications, and any relevant labs

## 2024-03-14 NOTE — BH INPATIENT PSYCHIATRY PROGRESS NOTE - NSBHCONSBHPROVDETAILS_PSY_A_CORE  FT
email sent to Dr. Hollie Cook for collateral

## 2024-03-14 NOTE — BH INPATIENT PSYCHIATRY PROGRESS NOTE - NSBHCHARTREVIEWVS_PSY_A_CORE FT
Vital Signs Last 24 Hrs  T(C): 36.1 (03-14-24 @ 08:53), Max: 36.6 (03-13-24 @ 19:30)  T(F): 97 (03-14-24 @ 08:53), Max: 97.9 (03-13-24 @ 19:30)  HR: --  BP: --  BP(mean): --  RR: 16 (03-14-24 @ 08:53) (16 - 18)  SpO2: --    Orthostatic VS  03-14-24 @ 08:53  Lying BP: --/-- HR: --  Sitting BP: 140/83 HR: 79  Standing BP: 144/81 HR: 87  Site: upper left arm  Mode: electronic  Orthostatic VS  03-13-24 @ 19:30  Lying BP: --/-- HR: --  Sitting BP: 154/83 HR: 67  Standing BP: 141/75 HR: 74  Site: --  Mode: --  Orthostatic VS  03-13-24 @ 08:22  Lying BP: --/-- HR: --  Sitting BP: 146/79 HR: 68  Standing BP: 128/70 HR: 82  Site: --  Mode: --  Orthostatic VS  03-12-24 @ 19:39  Lying BP: --/-- HR: --  Sitting BP: 120/85 HR: 81  Standing BP: 145/85 HR: 91  Site: --  Mode: --   Vital Signs Last 24 Hrs  T(C): 36.5 (03-15-24 @ 08:18), Max: 36.5 (03-15-24 @ 08:18)  T(F): 97.7 (03-15-24 @ 08:18), Max: 97.7 (03-15-24 @ 08:18)  HR: --  BP: --  BP(mean): --  RR: 16 (03-15-24 @ 08:18) (16 - 16)  SpO2: --    Orthostatic VS  03-15-24 @ 08:18  Lying BP: --/-- HR: --  Sitting BP: 150/84 HR: 70  Standing BP: 134/75 HR: 87  Site: upper left arm  Mode: electronic  Orthostatic VS  03-14-24 @ 19:52  Lying BP: --/-- HR: --  Sitting BP: 155/73 HR: 70  Standing BP: 136/67 HR: 77  Site: --  Mode: --  Orthostatic VS  03-14-24 @ 08:53  Lying BP: --/-- HR: --  Sitting BP: 140/83 HR: 79  Standing BP: 144/81 HR: 87  Site: upper left arm  Mode: electronic  Orthostatic VS  03-13-24 @ 19:30  Lying BP: --/-- HR: --  Sitting BP: 154/83 HR: 67  Standing BP: 141/75 HR: 74  Site: --  Mode: --

## 2024-03-14 NOTE — BH INPATIENT PSYCHIATRY DISCHARGE NOTE - ATTENDING DISCHARGE PHYSICAL EXAMINATION:
I have personally seen and evaluated patient prior to discharge. Chart reviewed and discharge plan discussed with the treatment team. Pt prior to discharge was calm, cooperative, friendly and smiling. Pt interacting well with others. No recent behavioral problems and no episodes of aggressive or dangerous behavior. No problems with sleep, appetite or energy level. Denied any active and current manic, hypomanic, depressive, anxiety symptoms, denies acute psychotic sxs. Denied any current SI/HI/AH/VH/PI. Pt with chronic paranoid delusions at baseline, however less intense and no longer overt.  Patient is organized and commits to safety and to ongoing outpatient treatment. Pt sodium improved since admission, last sodium level 131 with fluid restriction <1L, pt encouraged to continue this at home and pt agrees.   Pt asks relevant questions about his discharge plan and about the medication regimen. Pt articulate a plan for living life after discharge. Medication risks/benefits/side effects were discussed with the patient.  Patient expressed understanding and agreed with the treatment plan. Further, instructed the patient to seek help immediately by dialing "911" or by going to the nearest ER if symptoms worsen.   Chronic risk factors include hx of schizoaffective d/o, PMHx DM2, HTN   Protective factors include domiciled, supportive community support, compliant with psychiatric medications, motivated to engage in outpateint psychiatric treatment, future oriented thinking, expresses concern for well being,  much aspirations for return to prior baseline functioning and wish to pursue pleasurable activities.  As such, its chronic risk factors are mitigated by their protective factors. Pt does not pose an acute elevated risk of imminent danger to harm to self or others. Does not require further inpatient psychiatric admisison at this time. Psychiatrically cleared for discharge.   Pt urged to avoid using any alcohol or street drugs, particularly marijuana & K2, cocaine and methamphetamine (crystal meth) once discharged.  Safety plan filled out by patient and submitted into chart.  Pt submitted 3DL, and based on rationale above, pt did not meet criteria to convert to involuntary status at this time.

## 2024-03-14 NOTE — BH INPATIENT PSYCHIATRY PROGRESS NOTE - NSBHFUPINTERVALHXFT_PSY_A_CORE
Chart and history reviewed and discussed with treatment team. No events reported overnight. Pt seen resting in bed, stating that mood is "good." Denies AH but at times appears preoccupied and distracted during interactions. States that he feels safe on the unit and feels safe to return home. coping skills reviewed with pt stating that he would go for walks if felt more distressed at home and talk to someone. Pt made aware of CM and psychologist who are available to help him, along with the ED if needed, with pt voicing understanding. Pt remains in fluid restrictions of 800cc at this time. Pt educated on importance of maintaining fluid restriction, stating that he no longer drinks from the faucet at this time. Reviewed current fluid intake with pt. Denies any symptoms of hyponatremia. Eyes improved bilaterally in redness, denying itchiness, teary eyes, or grittiness. No behavioral issues noted. Medication compliant, tolerating well, free of EPS. Sleep and appetite maintained. Denies SI/HI/AVH. Continue to monitor for safety.

## 2024-03-14 NOTE — BH INPATIENT PSYCHIATRY DISCHARGE NOTE - HPI (INCLUDE ILLNESS QUALITY, SEVERITY, DURATION, TIMING, CONTEXT, MODIFYING FACTORS, ASSOCIATED SIGNS AND SYMPTOMS)
58M, domiciled in supportive housing TSI, SSD, single, PMH HTN, DM, hypogammaglobulinemia, PPHx schizoaffective d/o, bipolar d/o, hx of multiple psych hospitalizations (last known in 2020 at [facility]), denies hx SA/NSSIB, pt in treatment at [facility] with DR. Cook , and is on BELLA , denies drug or alcohol use, denies legal hx;   pt bib self for  ah , si/hi.     on assessment on ML4: pt seen resting in bed, endorsing wanting to be left alone. When asked who was bothering him, pt states that he hears "mumbling" at his resident, unable to make out what these voices are saying, denying +CAH at this time. When asked about VH, pt states "I don't like [place] people." Pt reports paranoia regarding the [place] mob, stating that they "don't want me saying anything" and that "they are going to kill us" so that we "don't give them a bad rap." When asked if pt felt safe on the unit, pt stated yes but that "the mob doesn't want me to talk to anyone" and stated his preference to not speak further to NP regarding his current situation. Pt denied SI/HI at time of assessment and encouraged to come to staff with any concerns. Denies symptoms of hyponatremai or severe hypertension. Will attempt more complete assessment later. Pt informed of plan to administer abilify maintana early, with pt voicing agreement to plan.     as per outpatient , em note on 12/27/23  :"Pt reports euthymic mood, denies recent AH (states when he was hospitalized in November for BP management and he was hearing voices at the time => c/l team started abilify 5mg with good effect; bp currently stable). Denies SI, HI, PI." Patient received Abilify Maintana 400mg IM on 2/14/24 and is scheduled on 3/15 for the next injection .     Patient was initially taken to main ED as his BP was elevated 203/104 upon arrival, but has improved since and he was given IV fluids due to  , without any altered mentation , no further medical intervention , BMP will be repeated. Pt on assessment,  is calm, cooperative , endorsing feeling "bad , I don't want to live anymore". Pt states he has been hearing noises of banging and hearing voices but can't make out what the  voices are saying . He reports the sounds and the voices have gotten worse causing him feel hopeless and having si . Patient says he has thought of taking his pills as an overdose but has not taken any steps towards the plan and denies intention of hurting himself. Patient also reports he has been having +hi , when he hears people outside working and making noise, but denies any specific plan or intent and denies anyone in specific.   Patient  denies any access to guns. Pt denies vh, but feels paranoid at night feels someone is breaking into his house.  He  reports  feeling  depressed, he has been having low energy , less sleep, +anhedonia .  He denies any manic sx , including grandiosity, elevated mood , decrease need for sleep.  Patient has been complaint with injection clinic appointments .

## 2024-03-14 NOTE — PROGRESS NOTE ADULT - ASSESSMENT
58 M with HTN, T2DM (on Metformin), HLD, CVID with hypogammaglobulinemia, schizoaffective disorder with multiple psych hospitalizations,  in treatment at [facility], and is on BELLA   presenting for evaluation of worsening auditory hallucinations. Medicine consulted for hyponatremia       # Acute on chronic  Hyponatremia- Na improving to 131 from 125 with free water restriction   Pt asymptomatic - denies headache, lethargy or any other neuro deficits  Reviewed charts- Pt with history of acute on chronic hyponatremia with serum sodium as low as 118. 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  and was advised fluid restriction. Serum Osom in 11/23 was 269, urine osom was 262, not consistent with SIADH. Na improved with strict fluid restriction  Reviewed recent serum Osom- 269, urine Osom 106- less likely SIADH  Continue  strict fluid restriction to <1 L/day     #H/o HTN- SBP in 130s to 140s. on Labetalol 200mg Q 12,  Losartan 50mcg    # H/o Hypothyroidism- TSH 2.33. Continue Synthroid     # H/O CVID- Last infusion was on 2/29. Pt reports he is due for it next on 3/29> [facility] Pysch     # H/o DM2- A1C of 5.6, BS within low normal to normal range. Continue to monitor

## 2024-03-14 NOTE — BH INPATIENT PSYCHIATRY PROGRESS NOTE - NSTXPSYCHOINTERMD_PSY_ALL_CORE
medication compliance and education  continue abilify BELLA

## 2024-03-14 NOTE — BH INPATIENT PSYCHIATRY DISCHARGE NOTE - NSBHDCRISKMITIGATE_PSY_ALL_CORE
Safety planning/Referral to case management/Family/Other social support involvement/Long acting injectable medication/Medications targeting suicidality/non-suicidal self injurious behavior/Assisted outpatient treatment

## 2024-03-14 NOTE — BH INPATIENT PSYCHIATRY PROGRESS NOTE - NSDCCRITERIA_PSY_ALL_CORE
CGI <2 return to baseline functioning as evidenced by behavioral control, staff observation, and pt self report

## 2024-03-14 NOTE — BH INPATIENT PSYCHIATRY PROGRESS NOTE - CURRENT MEDICATION
MEDICATIONS  (STANDING):  atorvastatin 40 milliGRAM(s) Oral at bedtime  ciprofloxacin  0.3% Ophthalmic Solution 2 Drop(s) Both EYES four times a day  dextrose 5%. 1000 milliLiter(s) (50 mL/Hr) IV Continuous <Continuous>  dextrose 5%. 1000 milliLiter(s) (100 mL/Hr) IV Continuous <Continuous>  dextrose 50% Injectable 12.5 Gram(s) IV Push once  dextrose 50% Injectable 25 Gram(s) IV Push once  dextrose 50% Injectable 25 Gram(s) IV Push once  glucagon  Injectable 1 milliGRAM(s) IntraMuscular once  insulin lispro (ADMELOG) corrective regimen sliding scale   SubCutaneous three times a day before meals  labetalol 200 milliGRAM(s) Oral every 12 hours  levothyroxine 50 MICROGram(s) Oral daily  losartan 100 milliGRAM(s) Oral daily  melatonin. 5 milliGRAM(s) Oral at bedtime  metFORMIN 500 milliGRAM(s) Oral two times a day  polyethylene glycol 3350 17 Gram(s) Oral daily  senna 2 Tablet(s) Oral at bedtime    MEDICATIONS  (PRN):  acetaminophen     Tablet .. 650 milliGRAM(s) Oral every 6 hours PRN Temp greater or equal to 38C (100.4F), Mild Pain (1 - 3), Moderate Pain (4 - 6), Severe Pain (7 - 10)  dextrose Oral Gel 15 Gram(s) Oral once PRN Blood Glucose LESS THAN 70 milliGRAM(s)/deciliter  haloperidol     Tablet 5 milliGRAM(s) Oral every 6 hours PRN agitation  haloperidol    Injectable 5 milliGRAM(s) IntraMuscular once PRN Agitation  lactulose Syrup 10 Gram(s) Oral daily PRN constipation  LORazepam     Tablet 2 milliGRAM(s) Oral every 6 hours PRN Agitation  LORazepam   Injectable 2 milliGRAM(s) IntraMuscular Once PRN Agitation  nicotine  Polacrilex Gum 2 milliGRAM(s) Oral every 2 hours PRN breakthrough cravings   MEDICATIONS  (STANDING):  atorvastatin 40 milliGRAM(s) Oral at bedtime  ciprofloxacin  0.3% Ophthalmic Solution 2 Drop(s) Both EYES four times a day  dextrose 5%. 1000 milliLiter(s) (50 mL/Hr) IV Continuous <Continuous>  dextrose 5%. 1000 milliLiter(s) (100 mL/Hr) IV Continuous <Continuous>  dextrose 50% Injectable 25 Gram(s) IV Push once  dextrose 50% Injectable 25 Gram(s) IV Push once  dextrose 50% Injectable 12.5 Gram(s) IV Push once  glucagon  Injectable 1 milliGRAM(s) IntraMuscular once  insulin lispro (ADMELOG) corrective regimen sliding scale   SubCutaneous three times a day before meals  labetalol 200 milliGRAM(s) Oral every 12 hours  levothyroxine 50 MICROGram(s) Oral daily  losartan 100 milliGRAM(s) Oral daily  melatonin. 5 milliGRAM(s) Oral at bedtime  metFORMIN 500 milliGRAM(s) Oral two times a day  polyethylene glycol 3350 17 Gram(s) Oral daily  senna 2 Tablet(s) Oral at bedtime    MEDICATIONS  (PRN):  acetaminophen     Tablet .. 650 milliGRAM(s) Oral every 6 hours PRN Temp greater or equal to 38C (100.4F), Mild Pain (1 - 3), Moderate Pain (4 - 6), Severe Pain (7 - 10)  dextrose Oral Gel 15 Gram(s) Oral once PRN Blood Glucose LESS THAN 70 milliGRAM(s)/deciliter  haloperidol     Tablet 5 milliGRAM(s) Oral every 6 hours PRN agitation  haloperidol    Injectable 5 milliGRAM(s) IntraMuscular once PRN Agitation  lactulose Syrup 10 Gram(s) Oral daily PRN constipation  LORazepam     Tablet 2 milliGRAM(s) Oral every 6 hours PRN Agitation  LORazepam   Injectable 2 milliGRAM(s) IntraMuscular Once PRN Agitation  nicotine  Polacrilex Gum 2 milliGRAM(s) Oral every 2 hours PRN breakthrough cravings

## 2024-03-14 NOTE — BH INPATIENT PSYCHIATRY DISCHARGE NOTE - REASON FOR ADMISSION
you were admitted to [facility] for worsening paranoia and +AH resulting in passive SI

## 2024-03-14 NOTE — PROGRESS NOTE ADULT - SUBJECTIVE AND OBJECTIVE BOX
Manuel Glover  Hospitalist  Pager- [phone]    PROGRESS NOTE:     Patient is a 58y old  Male who presents with a chief complaint of Psychosis (12 Mar 2024 14:58)      SUBJECTIVE / OVERNIGHT EVENTS: Pt seen and examined by me. Feels well  Denies N/V/Abdominal pain. Reports he is isn't drinking as much  free water as before  Had a BM this AM   Reports good appetite  ADDITIONAL REVIEW OF SYSTEMS:    MEDICATIONS  (STANDING):  atorvastatin 40 milliGRAM(s) Oral at bedtime  ciprofloxacin  0.3% Ophthalmic Solution 2 Drop(s) Both EYES four times a day  dextrose 5%. 1000 milliLiter(s) (100 mL/Hr) IV Continuous <Continuous>  dextrose 5%. 1000 milliLiter(s) (50 mL/Hr) IV Continuous <Continuous>  dextrose 50% Injectable 12.5 Gram(s) IV Push once  dextrose 50% Injectable 25 Gram(s) IV Push once  dextrose 50% Injectable 25 Gram(s) IV Push once  glucagon  Injectable 1 milliGRAM(s) IntraMuscular once  insulin lispro (ADMELOG) corrective regimen sliding scale   SubCutaneous three times a day before meals  labetalol 200 milliGRAM(s) Oral every 12 hours  levothyroxine 50 MICROGram(s) Oral daily  losartan 100 milliGRAM(s) Oral daily  melatonin. 5 milliGRAM(s) Oral at bedtime  metFORMIN 500 milliGRAM(s) Oral two times a day  polyethylene glycol 3350 17 Gram(s) Oral daily  senna 2 Tablet(s) Oral at bedtime    MEDICATIONS  (PRN):  acetaminophen     Tablet .. 650 milliGRAM(s) Oral every 6 hours PRN Temp greater or equal to 38C (100.4F), Mild Pain (1 - 3), Moderate Pain (4 - 6), Severe Pain (7 - 10)  dextrose Oral Gel 15 Gram(s) Oral once PRN Blood Glucose LESS THAN 70 milliGRAM(s)/deciliter  haloperidol     Tablet 5 milliGRAM(s) Oral every 6 hours PRN agitation  haloperidol    Injectable 5 milliGRAM(s) IntraMuscular once PRN Agitation  lactulose Syrup 10 Gram(s) Oral daily PRN constipation  LORazepam     Tablet 2 milliGRAM(s) Oral every 6 hours PRN Agitation  LORazepam   Injectable 2 milliGRAM(s) IntraMuscular Once PRN Agitation  nicotine  Polacrilex Gum 2 milliGRAM(s) Oral every 2 hours PRN breakthrough cravings      CAPILLARY BLOOD GLUCOSE      POCT Blood Glucose.: 108 mg/dL (14 Mar 2024 11:41)  POCT Blood Glucose.: 107 mg/dL (14 Mar 2024 07:50)  POCT Blood Glucose.: 104 mg/dL (13 Mar 2024 20:09)  POCT Blood Glucose.: 128 mg/dL (13 Mar 2024 16:11)    I&O's Summary    13 Mar 2024 07:01  -  14 Mar 2024 07:00  --------------------------------------------------------  IN: 1590 mL / OUT: 0 mL / NET: 1590 mL        PHYSICAL EXAM:  Vital Signs Last 24 Hrs  T(C): 36.1 (14 Mar 2024 08:53), Max: 36.6 (13 Mar 2024 19:30)  T(F): 97 (14 Mar 2024 08:53), Max: 97.9 (13 Mar 2024 19:30)  HR: --  BP: --  BP(mean): --  RR: 16 (14 Mar 2024 08:53) (16 - 18)  SpO2: --        CONSTITUTIONAL: NAD, well-developed missing teeth   RESPIRATORY: Normal respiratory effort; lungs are clear to auscultation bilaterally  CARDIOVASCULAR: Regular rate and rhythm, normal S1 and S2, no murmur/rub/gallop; No lower extremity edema; Peripheral pulses are 2+ bilaterally  ABDOMEN: Nontender to palpation, normoactive bowel sounds, no rebound/guarding; No hepatosplenomegaly  MUSCLOSKELETAL: no clubbing or cyanosis of digits; no joint swelling or tenderness to palpation  PSYCH: A+O to person, place, and time; affect appropriate  NEURO: no gross deficits  SKIN: no rash     LABS:    03-13    131<L>  |  92<L>  |  12  ----------------------------<  98  4.6   |  29  |  0.82    Ca    9.8      13 Mar 2024 09:00  Phos  3.3     03-13  Mg     2.20     03-13            Urinalysis Basic - ( 13 Mar 2024 09:00 )    Color: x / Appearance: x / SG: x / pH: x  Gluc: 98 mg/dL / Ketone: x  / Bili: x / Urobili: x   Blood: x / Protein: x / Nitrite: x   Leuk Esterase: x / RBC: x / WBC x   Sq Epi: x / Non Sq Epi: x / Bacteria: x          RADIOLOGY & ADDITIONAL TESTS:  Results Reviewed:   Imaging Personally Reviewed:  Electrocardiogram Personally Reviewed:    COORDINATION OF CARE:  Care Discussed with Consultants/Other Providers [Y/N]:  Prior or Outpatient Records Reviewed [Y/N]:

## 2024-03-14 NOTE — BH INPATIENT PSYCHIATRY PROGRESS NOTE - NSBHMSEINSIGHT_PSY_A_CORE
Bed: ED16  Expected date: 1/20/23  Expected time:   Means of arrival:   Comments:  EMS  
Fair

## 2024-03-14 NOTE — BH INPATIENT PSYCHIATRY DISCHARGE NOTE - NSBHMSEPERCEPT_PSY_A_CORE
Problem: Anxiety  Goal: Will report anxiety at manageable levels  Description: INTERVENTIONS:  1. Administer medication as ordered  2. Teach and rehearse alternative coping skills  3. Provide emotional support with 1:1 interaction with staff  Outcome: Not Progressing     Problem: Confusion  Goal: Confusion, delirium, dementia, or psychosis is improved or at baseline  Description: INTERVENTIONS:  1. Assess for possible contributors to thought disturbance, including medications, impaired vision or hearing, underlying metabolic abnormalities, dehydration, psychiatric diagnoses, and notify attending LIP  2. Sesser high risk fall precautions, as indicated  3. Provide frequent short contacts to provide reality reorientation, refocusing and direction  4. Decrease environmental stimuli, including noise as appropriate  5. Monitor and intervene to maintain adequate nutrition, hydration, elimination, sleep and activity  6. If unable to ensure safety without constant attention obtain sitter and review sitter guidelines with assigned personnel  7. Initiate Psychosocial CNS and Spiritual Care consult, as indicated  Outcome: Not Progressing   Pt was admitted to room 2303. Pt cooperative. Having some paranoid delusional thoughts. He denies hallucinations of any kind, but at time seems to be responding to internal stimuli.  Pt is anxious and coping skills was discussed.   Auditory hallucinations/Other

## 2024-03-14 NOTE — BH INPATIENT PSYCHIATRY PROGRESS NOTE - NSBHATTESTATTENDBILLTIME2_PSY_A_CORE
I have reviewed and verified the documentation.

## 2024-03-15 VITALS — TEMPERATURE: 98 F | RESPIRATION RATE: 16 BRPM

## 2024-03-15 LAB — GLUCOSE BLDC GLUCOMTR-MCNC: 107 MG/DL — HIGH (ref 70–99)

## 2024-03-15 RX ADMIN — LOSARTAN POTASSIUM 100 MILLIGRAM(S): 100 TABLET, FILM COATED ORAL at 08:41

## 2024-03-15 RX ADMIN — METFORMIN HYDROCHLORIDE 500 MILLIGRAM(S): 850 TABLET ORAL at 08:41

## 2024-03-15 RX ADMIN — Medication 2 DROP(S): at 08:41

## 2024-03-15 RX ADMIN — POLYETHYLENE GLYCOL 3350 17 GRAM(S): 17 POWDER, FOR SOLUTION ORAL at 08:41

## 2024-03-15 RX ADMIN — Medication 200 MILLIGRAM(S): at 08:41

## 2024-03-15 RX ADMIN — Medication 50 MICROGRAM(S): at 06:11

## 2024-03-15 NOTE — BH DISCHARGE NOTE NURSING/SOCIAL WORK/PSYCH REHAB - NSDCPRGOAL_PSY_ALL_CORE
Writer met with patient in order to discuss progress towards goal upon discharge. Pt has made fair progress, as patient has identified going for walks around house and listening to music as positive coping skills. Writer provided support and encouragement.     On the unit, patient was minimally visible, isolative, not interacting with peers. Upon discharge, patient appears less depressed. Patient states he is looking forward to discharge. Writer and patient completed safety plan. Patient denies SI     Patient did not attend any psychiatric rehabilitation groups during the last seven days despite daily prompting and encouragement from staff.

## 2024-03-15 NOTE — BH DISCHARGE NOTE NURSING/SOCIAL WORK/PSYCH REHAB - NSCDUDCCRISIS_PSY_A_CORE
[facility]  1 (170) [facility]-WELL (613-3188)  Text "WELL" to 04499  Website: www.C-nario/.[facility] 1 (698) 621-HOPE (9902) Website: www.safehorizon.org/.  [facility] - [facility]ASH – Crisis Care for Children, Adults and Families  [address] [place] [address]  Mobile Crisis Hotline – (379) 934-3701/.National Suicide Prevention Lifeline 4 (987) 496-7173/.  [facility]  1 (132) LIFENET (304-5404)/.  [facility]  (436) 881-1492/.  [facility] Behavioral Health Helpline / [facility] Mobile Crisis  (540) 978-MSRJ (8739)/.  [facility] Response Crisis Hotline  (480) 105-7644  24 hour telephone crisis intervention and suicide prevention hotline concerned with all mental health issues/.  [facility]  [address] [place] [address] [phone]) [phone]   Hours:  Monday through Friday from 9 AM to 3 PM/988 Suicide and Crisis Lifeline

## 2024-03-15 NOTE — BH DISCHARGE NOTE NURSING/SOCIAL WORK/PSYCH REHAB - DISCHARGE INSTRUCTIONS AFTERCARE APPOINTMENTS
Please note that this patient Is discharging on a 3 DAY LETTER.   In order to check the location, date, or time of your aftercare appointment, please refer to your Discharge Instructions Document given to you upon leaving the hospital.  If you have lost the instructions please call [phone]

## 2024-03-15 NOTE — BH DISCHARGE NOTE NURSING/SOCIAL WORK/PSYCH REHAB - PATIENT PORTAL LINK FT
You can access the FollowMyHealth Patient Portal offered by [facility] by registering at the following website: [email or web address]. By joining [facility]’s FollowMyHealth portal, you will also be able to view your health information using other applications (apps) compatible with our system.

## 2024-03-18 ENCOUNTER — APPOINTMENT (OUTPATIENT)
Dept: ALLERGY | Facility: CLINIC | Age: 59
End: 2024-03-18
Payer: MEDICARE

## 2024-03-18 VITALS
HEIGHT: 74 IN | TEMPERATURE: 98 F | HEART RATE: 84 BPM | BODY MASS INDEX: 33.5 KG/M2 | OXYGEN SATURATION: 97 % | WEIGHT: 261 LBS | SYSTOLIC BLOOD PRESSURE: 158 MMHG | DIASTOLIC BLOOD PRESSURE: 80 MMHG

## 2024-03-18 PROCEDURE — 99214 OFFICE O/P EST MOD 30 MIN: CPT

## 2024-03-18 NOTE — SOCIAL HISTORY
[House] : [unfilled] lives in a house  [Cat] : cat [Single] : single [FreeTextEntry2] : on disability [de-identified] : lives

## 2024-03-18 NOTE — ASSESSMENT
[FreeTextEntry1] : CVID -  Resolved bronchial infection Tobacco dependency COPD   Continue Gammagard monthly infusion  Continue Trelegy 200 QD.  Continue albuterol 2 puffs QID prn

## 2024-03-18 NOTE — PHYSICAL EXAM
[Alert] : alert [No Acute Distress] : no acute distress [Normal Voice/Communication] : normal voice communication [No Neck Mass] : no neck mass was observed [No LAD] : no lymphadenopathy [Wheezing] : no wheezing was heard [Normal S1, S2] : normal S1 and S2 [Normal Rate] : heart rate was normal  [No murmur] : no murmur [Regular Rhythm] : with a regular rhythm [Normal Cervical Lymph Nodes] : cervical [No Rash] : no rash [No clubbing] : no clubbing [No Edema] : no edema [No Cyanosis] : no cyanosis [de-identified] : obese  [de-identified] : LE in wrap  [de-identified] : adentulous  [de-identified] : unkempt male

## 2024-03-18 NOTE — HISTORY OF PRESENT ILLNESS
[de-identified] : Patient is taking Trelegy 200 QD and completed his Augmentin.    His mucus is now clear - some foul smelling mucus now.   Feels much better tho.

## 2024-03-22 NOTE — DISCHARGE NOTE PROVIDER - NSDCQMSTAIRS_GEN_ALL_CORE
PAST MEDICAL HISTORY:  BPH (benign prostatic hyperplasia)     HIV (human immunodeficiency virus infection)      No

## 2024-03-28 ENCOUNTER — APPOINTMENT (OUTPATIENT)
Dept: RHEUMATOLOGY | Facility: CLINIC | Age: 59
End: 2024-03-28
Payer: MEDICARE

## 2024-03-28 VITALS — RESPIRATION RATE: 96 BRPM | HEART RATE: 71 BPM | SYSTOLIC BLOOD PRESSURE: 161 MMHG | DIASTOLIC BLOOD PRESSURE: 83 MMHG

## 2024-03-28 VITALS
HEART RATE: 64 BPM | SYSTOLIC BLOOD PRESSURE: 132 MMHG | DIASTOLIC BLOOD PRESSURE: 78 MMHG | TEMPERATURE: 98.3 F | RESPIRATION RATE: 16 BRPM | OXYGEN SATURATION: 96 %

## 2024-03-28 PROCEDURE — 96366 THER/PROPH/DIAG IV INF ADDON: CPT

## 2024-03-28 PROCEDURE — 96365 THER/PROPH/DIAG IV INF INIT: CPT

## 2024-03-28 RX ORDER — DIPHENHYDRAMINE HCL 25 MG/1
25 TABLET ORAL
Qty: 0 | Refills: 0 | Status: COMPLETED
Start: 2023-04-05

## 2024-03-28 RX ORDER — ACETAMINOPHEN 325 MG/1
325 TABLET ORAL
Qty: 0 | Refills: 0 | Status: COMPLETED
Start: 2023-04-05

## 2024-03-28 RX ORDER — IMMUNE GLOBULIN INTRAVENOUS (HUMAN) 10 G
10 KIT INTRAVENOUS
Qty: 0 | Refills: 0 | Status: COMPLETED | OUTPATIENT
Start: 2024-03-01

## 2024-03-28 NOTE — HISTORY OF PRESENT ILLNESS
[N/A] : N/A [Denies] : Denies [No] : No [Yes] : Yes [22g] : 22g [Start Time: ___] : Medication Start Time: [unfilled] [End Time: ___] : Medication End Time: [unfilled] [Medication Name: ___] : Medication Name: [unfilled] [Total Amount Administered: ___] : Total Amount Administered: [unfilled] [IV discontinued. Intact. No signs or symptoms of IV complications noted. Time: ___] : IV discontinued. Intact. No signs or symptoms of IV complications noted. Time: [unfilled] [Patient  instructed to seek medical attention with signs and symptoms of adverse effects] : Patient  instructed to seek medical attention with signs and symptoms of adverse effects [Patient left unit in no acute distress] : Patient left unit in no acute distress [Medications administered as ordered and tolerated well.] : Medications administered as ordered and tolerated well. [de-identified] : left leg wound [de-identified] : left hand dorsal vein  [de-identified] : Patient presents for scheduled Gammagard S/D infusion, ambulatory in [facility]. Today, patient reports overall to be doing well and denies recent infections or ABX use.  Patient continues to reports having wound to his left leg and is continuing to heal. No other symptoms or concerns verbalized at this time. Patient pre-medicated, infusion titrated as per protocol and tolerated well. Patient left unit ambulatory in [facility].  [de-identified] : no labs

## 2024-04-16 PROCEDURE — 99214 OFFICE O/P EST MOD 30 MIN: CPT

## 2024-04-17 ENCOUNTER — APPOINTMENT (OUTPATIENT)
Dept: ALLERGY | Facility: CLINIC | Age: 59
End: 2024-04-17

## 2024-04-25 ENCOUNTER — APPOINTMENT (OUTPATIENT)
Dept: RHEUMATOLOGY | Facility: CLINIC | Age: 59
End: 2024-04-25
Payer: MEDICARE

## 2024-04-25 VITALS
OXYGEN SATURATION: 96 % | DIASTOLIC BLOOD PRESSURE: 94 MMHG | RESPIRATION RATE: 16 BRPM | HEART RATE: 60 BPM | SYSTOLIC BLOOD PRESSURE: 170 MMHG

## 2024-04-25 VITALS
OXYGEN SATURATION: 96 % | HEART RATE: 62 BPM | RESPIRATION RATE: 16 BRPM | SYSTOLIC BLOOD PRESSURE: 154 MMHG | DIASTOLIC BLOOD PRESSURE: 92 MMHG | TEMPERATURE: 97.8 F

## 2024-04-25 PROCEDURE — 96366 THER/PROPH/DIAG IV INF ADDON: CPT

## 2024-04-25 PROCEDURE — 96365 THER/PROPH/DIAG IV INF INIT: CPT

## 2024-04-25 RX ORDER — ACETAMINOPHEN 325 MG/1
325 TABLET ORAL
Qty: 0 | Refills: 0 | Status: COMPLETED
Start: 2023-04-05

## 2024-04-25 RX ORDER — DIPHENHYDRAMINE HCL 25 MG/1
25 TABLET ORAL
Qty: 0 | Refills: 0 | Status: COMPLETED
Start: 2023-04-05

## 2024-04-27 NOTE — ED ADULT TRIAGE NOTE - ARRIVAL FROM
Home Walking - Indoors allowed/Walking - Outdoors allowed Bathing allowed/Do not drive or operate machinery/Showering allowed/Walking - Indoors allowed/No heavy lifting/straining/Walking - Outdoors allowed/Activity as tolerated

## 2024-04-29 RX ORDER — IMMUNE GLOBULIN INFUSION (HUMAN) 100 MG/ML
30 INJECTION, SOLUTION INTRAVENOUS; SUBCUTANEOUS
Qty: 0 | Refills: 0 | Status: COMPLETED
Start: 2023-04-17

## 2024-04-29 NOTE — HISTORY OF PRESENT ILLNESS
[N/A] : N/A [Denies] : Denies [No] : No [Yes] : Yes [22g] : 22g [Start Time: ___] : Medication Start Time: [unfilled] [End Time: ___] : Medication End Time: [unfilled] [Medication Name: ___] : Medication Name: [unfilled] [Total Amount Administered: ___] : Total Amount Administered: [unfilled] [IV discontinued. Intact. No signs or symptoms of IV complications noted. Time: ___] : IV discontinued. Intact. No signs or symptoms of IV complications noted. Time: [unfilled] [Patient  instructed to seek medical attention with signs and symptoms of adverse effects] : Patient  instructed to seek medical attention with signs and symptoms of adverse effects [Patient left unit in no acute distress] : Patient left unit in no acute distress [Medications administered as ordered and tolerated well.] : Medications administered as ordered and tolerated well. [de-identified] : left leg wound [de-identified] : right hand dorsal vein  [de-identified] : no labs [de-identified] : Patient presents for scheduled Gammagard S/D infusion, ambulatory in [facility]. Today, patient reports overall to be doing well and denies recent infections or ABX use.  Patient continues to reports having wound to his left leg and is continuing to heal. No other symptoms or concerns verbalized at this time. Patient pre-medicated, infusion titrated as per protocol and tolerated well. Patient left unit ambulatory in [facility].

## 2024-05-02 NOTE — ED PROVIDER NOTE - NSICDXPASTMEDICALHX_GEN_ALL_CORE_FT
Plan: We have tried these treatments:\\nCiclopirox cream, desonide cream, clobetasol scalp solution, triamcinolone cream, Betamethasone lotion, clobetasol shampoo, ketoconazole cream, ketoconazole shampoo Continue Regimen: - fluocinolone 0.01 % topical body oil; Apply to scalp, cover with a shower cap, and leave on overnight. Shampoo off in the morning. Detail Level: Zone Render In Strict Bullet Format?: No Initiate Treatment: - nystatin 100,000 unit/gram topical cream; ATAA on neck and under arms  BID PRN until clear. PAST MEDICAL HISTORY:  Abscess of finger     DM (diabetes mellitus)     HTN (hypertension)     Hypogammaglobulinemia     Smoker

## 2024-05-17 RX ORDER — FLUTICASONE FUROATE, UMECLIDINIUM BROMIDE AND VILANTEROL TRIFENATATE 200; 62.5; 25 UG/1; UG/1; UG/1
200-62.5-25 POWDER RESPIRATORY (INHALATION)
Qty: 1 | Refills: 3 | Status: ACTIVE | COMMUNITY
Start: 2024-01-18 | End: 1900-01-01

## 2024-05-17 RX ORDER — FLUTICASONE FUROATE, UMECLIDINIUM BROMIDE AND VILANTEROL TRIFENATATE 100; 62.5; 25 UG/1; UG/1; UG/1
100-62.5-25 POWDER RESPIRATORY (INHALATION) DAILY
Qty: 1 | Refills: 0 | Status: ACTIVE | COMMUNITY
Start: 2022-03-11 | End: 1900-01-01

## 2024-05-23 ENCOUNTER — APPOINTMENT (OUTPATIENT)
Dept: RHEUMATOLOGY | Facility: CLINIC | Age: 59
End: 2024-05-23
Payer: MEDICARE

## 2024-05-23 VITALS — DIASTOLIC BLOOD PRESSURE: 94 MMHG | OXYGEN SATURATION: 95 % | HEART RATE: 66 BPM | SYSTOLIC BLOOD PRESSURE: 177 MMHG

## 2024-05-23 VITALS
HEART RATE: 71 BPM | SYSTOLIC BLOOD PRESSURE: 148 MMHG | TEMPERATURE: 98.2 F | OXYGEN SATURATION: 97 % | DIASTOLIC BLOOD PRESSURE: 83 MMHG

## 2024-05-23 PROCEDURE — 96366 THER/PROPH/DIAG IV INF ADDON: CPT | Mod: PD

## 2024-05-23 PROCEDURE — 96365 THER/PROPH/DIAG IV INF INIT: CPT | Mod: PD

## 2024-05-23 RX ORDER — ACETAMINOPHEN 325 MG/1
325 TABLET ORAL
Refills: 0 | Status: ACTIVE | OUTPATIENT
Start: 2024-05-23 | End: 1900-01-01

## 2024-05-23 RX ORDER — DIPHENHYDRAMINE HCL 25 MG/1
25 TABLET ORAL
Refills: 0 | Status: ACTIVE | OUTPATIENT
Start: 2024-05-23 | End: 1900-01-01

## 2024-05-23 RX ORDER — IMMUNE GLOBULIN INTRAVENOUS (HUMAN) 10 G
10 KIT INTRAVENOUS
Qty: 0 | Refills: 0 | Status: COMPLETED | OUTPATIENT
Start: 2023-11-10

## 2024-05-23 RX ADMIN — DIPHENHYDRAMINE HCL 0 MG: 25 TABLET ORAL at 00:00

## 2024-05-23 RX ADMIN — ACETAMINOPHEN 0 MG: 325 TABLET ORAL at 00:00

## 2024-05-23 NOTE — HISTORY OF PRESENT ILLNESS
[N/A] : N/A [Denies] : Denies [No] : No [Yes] : Yes [de-identified] : left leg wound [Left upper extremity] : Left upper extremity [22g] : 22g [Start Time: ___] : Medication Start Time: [unfilled] [End Time: ___] : Medication End Time: [unfilled] [Medication Name: ___] : Medication Name: [unfilled] [Total Amount Administered: ___] : Total Amount Administered: [unfilled] [IV discontinued. Intact. No signs or symptoms of IV complications noted. Time: ___] : IV discontinued. Intact. No signs or symptoms of IV complications noted. Time: [unfilled] [Patient  instructed to seek medical attention with signs and symptoms of adverse effects] : Patient  instructed to seek medical attention with signs and symptoms of adverse effects [Patient left unit in no acute distress] : Patient left unit in no acute distress [Medications administered as ordered and tolerated well.] : Medications administered as ordered and tolerated well. [de-identified] : hand dorsal vein  [de-identified] : no labs [de-identified] : Patient presents for scheduled Gammagard S/D infusion, ambulatory in [facility]. Today, patient reports overall to be doing well and denies recent infections or ABX use.  Patient continues to reports having wound to his left leg, open to air, dry/scabbed. Patient admits to starting Haldol 5mg QD PO x1 month, tolerating well as per patient.  No other symptoms or concerns verbalized at this time. Patient pre-medicated, infusion titrated as per protocol and tolerated well. Patient left unit ambulatory in [facility].

## 2024-05-24 ENCOUNTER — INPATIENT (INPATIENT)
Facility: HOSPITAL | Age: 59
LOS: 3 days | Discharge: HOME | End: 2024-05-28
Attending: HOSPITALIST | Admitting: HOSPITALIST
Payer: MEDICARE

## 2024-05-24 VITALS
HEART RATE: 70 BPM | SYSTOLIC BLOOD PRESSURE: 197 MMHG | TEMPERATURE: 98 F | OXYGEN SATURATION: 96 % | DIASTOLIC BLOOD PRESSURE: 99 MMHG | RESPIRATION RATE: 16 BRPM

## 2024-05-24 DIAGNOSIS — J34.2 DEVIATED NASAL SEPTUM: Chronic | ICD-10-CM

## 2024-05-24 DIAGNOSIS — K08.199 COMPLETE LOSS OF TEETH DUE TO OTHER SPECIFIED CAUSE, UNSPECIFIED CLASS: Chronic | ICD-10-CM

## 2024-05-24 PROCEDURE — 99285 EMERGENCY DEPT VISIT HI MDM: CPT

## 2024-05-24 NOTE — ED PROVIDER NOTE - CARE PLAN
Principal Discharge DX:	Productive cough   1 Principal Discharge DX:	Productive cough  Secondary Diagnosis:	Hyponatremia

## 2024-05-24 NOTE — ED ADULT TRIAGE NOTE - CHIEF COMPLAINT QUOTE
pt c/o productive cough with greenish phlegm x3 days. hx. HTN. DM2. pt denies chest pain, sob, n/v, fevers or chills. pt hypertensive in triage however asymptomatic. pt well appearing.

## 2024-05-24 NOTE — ED PROVIDER NOTE - CLINICAL SUMMARY MEDICAL DECISION MAKING FREE TEXT BOX
58-year-old male, past medical history of bipolar disorder, schizophrenia, hypertension, and DM2, presents to ED complaining of productive cough (green sputum) x 3 days.  Patient states cough is usually worse when he lies down.  Patient denies any recent sick contacts or travel.  Patient states feels similar to last time he had pneumonia in the past.  Patient is chronic 30-year smoker.  Denies fever/chills, chest pain, shortness of breath, abdominal pain, nausea/vomiting/diarrhea, urinary symptoms, lightheadedness/dizziness, weakness/numbness, rashes or any other symptoms at this time.    Vital signs significant for hypertension, otherwise is within normal limits.  Physical exam significant for well-appearing male in no acute distress.  Head is normocephalic is atraumatic.  Extraocular movements intact.  Pupils equal round reactive to light.  No conjunctival pallor.  Oropharynx is clear.  Heart is regular rate and rhythm with mild systolic murmur.  Lungs with mild right basilar crackles.  Abdomen is soft and nontender/nondistended.  Skin is warm and well-perfused without rashes.  Pulses are 2+ throughout.  Neuroexam is nonfocal.    Differential diagnosis includes but not limited to pneumonia versus COPD versus CHF versus viral syndrome.  Plan to check basic labs/electrolytes, troponin, VBG, BNP, and chest x-ray.  Dispo pending results and reassessment.  Although likely can be discharged home. 58-year-old male, past medical history of bipolar disorder, schizophrenia, hypertension, and DM2, presents to ED complaining of productive cough (green sputum) x 3 days.  Patient states cough is usually worse when he lies down.  Patient denies any recent sick contacts or travel.  Patient states feels similar to last time he had pneumonia in the past.  Patient is chronic 30-year smoker.  Denies fever/chills, chest pain, shortness of breath, abdominal pain, nausea/vomiting/diarrhea, urinary symptoms, lightheadedness/dizziness, weakness/numbness, rashes or any other symptoms at this time.    Vital signs significant for hypertension, otherwise is within normal limits.  Physical exam significant for well-appearing male in no acute distress.  Head is normocephalic is atraumatic.  Extraocular movements intact.  Pupils equal round reactive to light.  No conjunctival pallor.  Oropharynx is clear.  Heart is regular rate and rhythm with mild systolic murmur.  Lungs with mild right basilar crackles.  Abdomen is soft and nontender/nondistended.  Skin is warm and well-perfused without rashes.  Pulses are 2+ throughout.  Neuroexam is nonfocal.    Differential diagnosis includes but not limited to pneumonia versus COPD versus viral syndrome.  Plan to check basic labs/electrolytes, VBG, and chest x-ray.  Dispo pending results and reassessment.  Although likely can be discharged home.

## 2024-05-25 DIAGNOSIS — R05.8 OTHER SPECIFIED COUGH: ICD-10-CM

## 2024-05-25 LAB
ADD ON TEST-[facility] IN LAB: SIGNIFICANT CHANGE UP
ADD ON TEST-[facility] IN LAB: SIGNIFICANT CHANGE UP
ALBUMIN SERPL ELPH-MCNC: 3.9 G/DL — SIGNIFICANT CHANGE UP (ref 3.3–5)
ALBUMIN SERPL ELPH-MCNC: 4 G/DL — SIGNIFICANT CHANGE UP (ref 3.3–5)
ALP SERPL-CCNC: 142 U/L — HIGH (ref 40–120)
ALP SERPL-CCNC: 158 U/L — HIGH (ref 40–120)
ALT FLD-CCNC: 23 U/L — SIGNIFICANT CHANGE UP (ref 4–41)
ALT FLD-CCNC: 24 U/L — SIGNIFICANT CHANGE UP (ref 4–41)
ANION GAP SERPL CALC-SCNC: 13 MMOL/L — SIGNIFICANT CHANGE UP (ref 7–14)
ANION GAP SERPL CALC-SCNC: 14 MMOL/L — SIGNIFICANT CHANGE UP (ref 7–14)
APPEARANCE UR: CLEAR — SIGNIFICANT CHANGE UP
AST SERPL-CCNC: 31 U/L — SIGNIFICANT CHANGE UP (ref 4–40)
AST SERPL-CCNC: 33 U/L — SIGNIFICANT CHANGE UP (ref 4–40)
BACTERIA # UR AUTO: NEGATIVE /HPF — SIGNIFICANT CHANGE UP
BASE EXCESS BLDV CALC-SCNC: 3.8 MMOL/L — HIGH (ref -2–3)
BASOPHILS # BLD AUTO: 0.03 K/UL — SIGNIFICANT CHANGE UP (ref 0–0.2)
BASOPHILS NFR BLD AUTO: 0.6 % — SIGNIFICANT CHANGE UP (ref 0–2)
BILIRUB DIRECT SERPL-MCNC: <0.2 MG/DL — SIGNIFICANT CHANGE UP (ref 0–0.3)
BILIRUB INDIRECT FLD-MCNC: >0.2 MG/DL — SIGNIFICANT CHANGE UP (ref 0–1)
BILIRUB SERPL-MCNC: 0.3 MG/DL — SIGNIFICANT CHANGE UP (ref 0.2–1.2)
BILIRUB SERPL-MCNC: 0.4 MG/DL — SIGNIFICANT CHANGE UP (ref 0.2–1.2)
BILIRUB UR-MCNC: NEGATIVE — SIGNIFICANT CHANGE UP
BLOOD GAS VENOUS COMPREHENSIVE RESULT: SIGNIFICANT CHANGE UP
BUN SERPL-MCNC: 10 MG/DL — SIGNIFICANT CHANGE UP (ref 7–23)
BUN SERPL-MCNC: 6 MG/DL — LOW (ref 7–23)
CALCIUM SERPL-MCNC: 9.1 MG/DL — SIGNIFICANT CHANGE UP (ref 8.4–10.5)
CALCIUM SERPL-MCNC: 9.6 MG/DL — SIGNIFICANT CHANGE UP (ref 8.4–10.5)
CAST: 0 /LPF — SIGNIFICANT CHANGE UP (ref 0–4)
CHLORIDE BLDV-SCNC: 89 MMOL/L — LOW (ref 96–108)
CHLORIDE SERPL-SCNC: 85 MMOL/L — LOW (ref 98–107)
CHLORIDE SERPL-SCNC: 91 MMOL/L — LOW (ref 98–107)
CO2 BLDV-SCNC: 30.6 MMOL/L — HIGH (ref 22–26)
CO2 SERPL-SCNC: 23 MMOL/L — SIGNIFICANT CHANGE UP (ref 22–31)
CO2 SERPL-SCNC: 25 MMOL/L — SIGNIFICANT CHANGE UP (ref 22–31)
COLOR SPEC: YELLOW — SIGNIFICANT CHANGE UP
CREAT ?TM UR-MCNC: 35 MG/DL — SIGNIFICANT CHANGE UP
CREAT ?TM UR-MCNC: 49 MG/DL — SIGNIFICANT CHANGE UP
CREAT SERPL-MCNC: 0.71 MG/DL — SIGNIFICANT CHANGE UP (ref 0.5–1.3)
CREAT SERPL-MCNC: 0.77 MG/DL — SIGNIFICANT CHANGE UP (ref 0.5–1.3)
CREAT SERPL-MCNC: 0.9 MG/DL — SIGNIFICANT CHANGE UP (ref 0.5–1.3)
DIFF PNL FLD: NEGATIVE — SIGNIFICANT CHANGE UP
EGFR: 100 ML/MIN/1.73M2 — SIGNIFICANT CHANGE UP
EGFR: 105 ML/MIN/1.73M2 — SIGNIFICANT CHANGE UP
EGFR: 108 ML/MIN/1.73M2 — SIGNIFICANT CHANGE UP
EOSINOPHIL # BLD AUTO: 0.16 K/UL — SIGNIFICANT CHANGE UP (ref 0–0.5)
EOSINOPHIL NFR BLD AUTO: 3.1 % — SIGNIFICANT CHANGE UP (ref 0–6)
FLUAV AG NPH QL: SIGNIFICANT CHANGE UP
FLUBV AG NPH QL: SIGNIFICANT CHANGE UP
GAS PNL BLDV: 122 MMOL/L — LOW (ref 136–145)
GAS PNL BLDV: SIGNIFICANT CHANGE UP
GLUCOSE BLDV-MCNC: 114 MG/DL — HIGH (ref 70–99)
GLUCOSE SERPL-MCNC: 114 MG/DL — HIGH (ref 70–99)
GLUCOSE SERPL-MCNC: 94 MG/DL — SIGNIFICANT CHANGE UP (ref 70–99)
GLUCOSE UR QL: NEGATIVE MG/DL — SIGNIFICANT CHANGE UP
HCO3 BLDV-SCNC: 29 MMOL/L — SIGNIFICANT CHANGE UP (ref 22–29)
HCT VFR BLD CALC: 35.4 % — LOW (ref 39–50)
HCT VFR BLDA CALC: 37 % — LOW (ref 39–51)
HGB BLD CALC-MCNC: 12.4 G/DL — LOW (ref 12.6–17.4)
HGB BLD-MCNC: 12.5 G/DL — LOW (ref 13–17)
IANC: 3.7 K/UL — SIGNIFICANT CHANGE UP (ref 1.8–7.4)
IMM GRANULOCYTES NFR BLD AUTO: 0.4 % — SIGNIFICANT CHANGE UP (ref 0–0.9)
INR BLD: 1.04 RATIO — SIGNIFICANT CHANGE UP (ref 0.85–1.18)
KETONES UR-MCNC: NEGATIVE MG/DL — SIGNIFICANT CHANGE UP
LACTATE BLDV-MCNC: 1.3 MMOL/L — SIGNIFICANT CHANGE UP (ref 0.5–2)
LEGIONELLA AG UR QL: NEGATIVE — SIGNIFICANT CHANGE UP
LEUKOCYTE ESTERASE UR-ACNC: NEGATIVE — SIGNIFICANT CHANGE UP
LYMPHOCYTES # BLD AUTO: 0.52 K/UL — LOW (ref 1–3.3)
LYMPHOCYTES # BLD AUTO: 9.9 % — LOW (ref 13–44)
MCHC RBC-ENTMCNC: 28.6 PG — SIGNIFICANT CHANGE UP (ref 27–34)
MCHC RBC-ENTMCNC: 35.3 GM/DL — SIGNIFICANT CHANGE UP (ref 32–36)
MCV RBC AUTO: 81 FL — SIGNIFICANT CHANGE UP (ref 80–100)
MONOCYTES # BLD AUTO: 0.8 K/UL — SIGNIFICANT CHANGE UP (ref 0–0.9)
MONOCYTES NFR BLD AUTO: 15.3 % — HIGH (ref 2–14)
NEUTROPHILS # BLD AUTO: 3.7 K/UL — SIGNIFICANT CHANGE UP (ref 1.8–7.4)
NEUTROPHILS NFR BLD AUTO: 70.7 % — SIGNIFICANT CHANGE UP (ref 43–77)
NITRITE UR-MCNC: NEGATIVE — SIGNIFICANT CHANGE UP
NRBC # BLD: 0 /100 WBCS — SIGNIFICANT CHANGE UP (ref 0–0)
NRBC # FLD: 0 K/UL — SIGNIFICANT CHANGE UP (ref 0–0)
OSMOLALITY UR: 197 MOSM/KG — SIGNIFICANT CHANGE UP (ref 50–1200)
PCO2 BLDV: 46 MMHG — SIGNIFICANT CHANGE UP (ref 42–55)
PH BLDV: 7.41 — SIGNIFICANT CHANGE UP (ref 7.32–7.43)
PH UR: 7.5 — SIGNIFICANT CHANGE UP (ref 5–8)
PLATELET # BLD AUTO: 180 K/UL — SIGNIFICANT CHANGE UP (ref 150–400)
PO2 BLDV: 76 MMHG — HIGH (ref 25–45)
POTASSIUM BLDV-SCNC: 4.3 MMOL/L — SIGNIFICANT CHANGE UP (ref 3.5–5.1)
POTASSIUM SERPL-MCNC: 4.3 MMOL/L — SIGNIFICANT CHANGE UP (ref 3.5–5.3)
POTASSIUM SERPL-MCNC: 4.4 MMOL/L — SIGNIFICANT CHANGE UP (ref 3.5–5.3)
POTASSIUM SERPL-SCNC: 4.3 MMOL/L — SIGNIFICANT CHANGE UP (ref 3.5–5.3)
POTASSIUM SERPL-SCNC: 4.4 MMOL/L — SIGNIFICANT CHANGE UP (ref 3.5–5.3)
PROCALCITONIN SERPL-MCNC: 0.05 NG/ML — SIGNIFICANT CHANGE UP (ref 0.02–0.1)
PROT ?TM UR-MCNC: 75 MG/DL — SIGNIFICANT CHANGE UP
PROT SERPL-MCNC: 7.1 G/DL — SIGNIFICANT CHANGE UP (ref 6–8.3)
PROT SERPL-MCNC: 7.2 G/DL — SIGNIFICANT CHANGE UP (ref 6–8.3)
PROT UR-MCNC: 100 MG/DL
PROT/CREAT UR-RTO: 1.5 RATIO — HIGH (ref 0–0.2)
PROTHROM AB SERPL-ACNC: 11.6 SEC — SIGNIFICANT CHANGE UP (ref 9.5–13)
RBC # BLD: 4.37 M/UL — SIGNIFICANT CHANGE UP (ref 4.2–5.8)
RBC # FLD: 13.9 % — SIGNIFICANT CHANGE UP (ref 10.3–14.5)
RBC CASTS # UR COMP ASSIST: 0 /HPF — SIGNIFICANT CHANGE UP (ref 0–4)
RSV RNA NPH QL NAA+NON-PROBE: SIGNIFICANT CHANGE UP
SAO2 % BLDV: 97.3 % — HIGH (ref 67–88)
SARS-COV-2 RNA SPEC QL NAA+PROBE: DETECTED
SODIUM SERPL-SCNC: 122 MMOL/L — LOW (ref 135–145)
SODIUM SERPL-SCNC: 129 MMOL/L — LOW (ref 135–145)
SODIUM UR-SCNC: 46 MMOL/L — SIGNIFICANT CHANGE UP
SODIUM UR-SCNC: 49 MMOL/L — SIGNIFICANT CHANGE UP
SP GR SPEC: 1.01 — SIGNIFICANT CHANGE UP (ref 1–1.03)
SQUAMOUS # UR AUTO: 0 /HPF — SIGNIFICANT CHANGE UP (ref 0–5)
UROBILINOGEN FLD QL: 0.2 MG/DL — SIGNIFICANT CHANGE UP (ref 0.2–1)
WBC # BLD: 5.23 K/UL — SIGNIFICANT CHANGE UP (ref 3.8–10.5)
WBC # FLD AUTO: 5.23 K/UL — SIGNIFICANT CHANGE UP (ref 3.8–10.5)
WBC UR QL: 0 /HPF — SIGNIFICANT CHANGE UP (ref 0–5)

## 2024-05-25 PROCEDURE — 71046 X-RAY EXAM CHEST 2 VIEWS: CPT | Mod: 26

## 2024-05-25 RX ORDER — SODIUM CHLORIDE 9 MG/ML
1000 INJECTION, SOLUTION INTRAVENOUS
Refills: 0 | Status: DISCONTINUED | OUTPATIENT
Start: 2024-05-25 | End: 2024-05-28

## 2024-05-25 RX ORDER — DEXTROSE 50 % IN WATER 50 %
15 SYRINGE (ML) INTRAVENOUS ONCE
Refills: 0 | Status: DISCONTINUED | OUTPATIENT
Start: 2024-05-25 | End: 2024-05-28

## 2024-05-25 RX ORDER — INSULIN LISPRO 100/ML
VIAL (ML) SUBCUTANEOUS
Refills: 0 | Status: DISCONTINUED | OUTPATIENT
Start: 2024-05-25 | End: 2024-05-28

## 2024-05-25 RX ORDER — REMDESIVIR 5 MG/ML
100 INJECTION INTRAVENOUS EVERY 24 HOURS
Refills: 0 | Status: DISCONTINUED | OUTPATIENT
Start: 2024-05-26 | End: 2024-05-27

## 2024-05-25 RX ORDER — SODIUM CHLORIDE 9 MG/ML
1 INJECTION INTRAMUSCULAR; INTRAVENOUS; SUBCUTANEOUS THREE TIMES A DAY
Refills: 0 | Status: COMPLETED | OUTPATIENT
Start: 2024-05-25 | End: 2024-05-27

## 2024-05-25 RX ORDER — LABETALOL HCL 100 MG
200 TABLET ORAL
Refills: 0 | Status: DISCONTINUED | OUTPATIENT
Start: 2024-05-25 | End: 2024-05-28

## 2024-05-25 RX ORDER — DEXTROSE 50 % IN WATER 50 %
25 SYRINGE (ML) INTRAVENOUS ONCE
Refills: 0 | Status: DISCONTINUED | OUTPATIENT
Start: 2024-05-25 | End: 2024-05-28

## 2024-05-25 RX ORDER — REMDESIVIR 5 MG/ML
INJECTION INTRAVENOUS
Refills: 0 | Status: DISCONTINUED | OUTPATIENT
Start: 2024-05-25 | End: 2024-05-27

## 2024-05-25 RX ORDER — REMDESIVIR 5 MG/ML
200 INJECTION INTRAVENOUS EVERY 24 HOURS
Refills: 0 | Status: COMPLETED | OUTPATIENT
Start: 2024-05-25 | End: 2024-05-25

## 2024-05-25 RX ORDER — INSULIN LISPRO 100/ML
VIAL (ML) SUBCUTANEOUS AT BEDTIME
Refills: 0 | Status: DISCONTINUED | OUTPATIENT
Start: 2024-05-25 | End: 2024-05-28

## 2024-05-25 RX ORDER — ATORVASTATIN CALCIUM 80 MG/1
40 TABLET, FILM COATED ORAL AT BEDTIME
Refills: 0 | Status: DISCONTINUED | OUTPATIENT
Start: 2024-05-25 | End: 2024-05-28

## 2024-05-25 RX ORDER — DEXTROSE 50 % IN WATER 50 %
12.5 SYRINGE (ML) INTRAVENOUS ONCE
Refills: 0 | Status: DISCONTINUED | OUTPATIENT
Start: 2024-05-25 | End: 2024-05-28

## 2024-05-25 RX ORDER — HEPARIN SODIUM 5000 [USP'U]/ML
5000 INJECTION INTRAVENOUS; SUBCUTANEOUS EVERY 8 HOURS
Refills: 0 | Status: DISCONTINUED | OUTPATIENT
Start: 2024-05-25 | End: 2024-05-28

## 2024-05-25 RX ORDER — LOSARTAN POTASSIUM 100 MG/1
100 TABLET, FILM COATED ORAL DAILY
Refills: 0 | Status: DISCONTINUED | OUTPATIENT
Start: 2024-05-25 | End: 2024-05-28

## 2024-05-25 RX ORDER — SODIUM CHLORIDE 5 G/100ML
500 INJECTION, SOLUTION INTRAVENOUS
Refills: 0 | Status: DISCONTINUED | OUTPATIENT
Start: 2024-05-25 | End: 2024-05-25

## 2024-05-25 RX ORDER — AMLODIPINE BESYLATE 2.5 MG/1
5 TABLET ORAL DAILY
Refills: 0 | Status: DISCONTINUED | OUTPATIENT
Start: 2024-05-25 | End: 2024-05-28

## 2024-05-25 RX ORDER — DEXTROSE 10 % IN WATER 10 %
125 INTRAVENOUS SOLUTION INTRAVENOUS ONCE
Refills: 0 | Status: DISCONTINUED | OUTPATIENT
Start: 2024-05-25 | End: 2024-05-28

## 2024-05-25 RX ORDER — LEVOTHYROXINE SODIUM 125 MCG
50 TABLET ORAL DAILY
Refills: 0 | Status: DISCONTINUED | OUTPATIENT
Start: 2024-05-25 | End: 2024-05-28

## 2024-05-25 RX ORDER — GLUCAGON INJECTION, SOLUTION 0.5 MG/.1ML
1 INJECTION, SOLUTION SUBCUTANEOUS ONCE
Refills: 0 | Status: DISCONTINUED | OUTPATIENT
Start: 2024-05-25 | End: 2024-05-28

## 2024-05-25 RX ADMIN — HEPARIN SODIUM 5000 UNIT(S): 5000 INJECTION INTRAVENOUS; SUBCUTANEOUS at 22:40

## 2024-05-25 RX ADMIN — REMDESIVIR 200 MILLIGRAM(S): 5 INJECTION INTRAVENOUS at 11:28

## 2024-05-25 RX ADMIN — HEPARIN SODIUM 5000 UNIT(S): 5000 INJECTION INTRAVENOUS; SUBCUTANEOUS at 14:09

## 2024-05-25 RX ADMIN — Medication 200 MILLIGRAM(S): at 07:42

## 2024-05-25 RX ADMIN — Medication 200 MILLIGRAM(S): at 18:16

## 2024-05-25 RX ADMIN — AMLODIPINE BESYLATE 5 MILLIGRAM(S): 2.5 TABLET ORAL at 14:09

## 2024-05-25 RX ADMIN — ATORVASTATIN CALCIUM 40 MILLIGRAM(S): 80 TABLET, FILM COATED ORAL at 22:37

## 2024-05-25 RX ADMIN — LOSARTAN POTASSIUM 100 MILLIGRAM(S): 100 TABLET, FILM COATED ORAL at 07:43

## 2024-05-25 RX ADMIN — Medication 50 MICROGRAM(S): at 07:42

## 2024-05-25 RX ADMIN — SODIUM CHLORIDE 1 GRAM(S): 9 INJECTION INTRAMUSCULAR; INTRAVENOUS; SUBCUTANEOUS at 14:08

## 2024-05-25 RX ADMIN — SODIUM CHLORIDE 1 GRAM(S): 9 INJECTION INTRAMUSCULAR; INTRAVENOUS; SUBCUTANEOUS at 22:37

## 2024-05-25 RX ADMIN — SODIUM CHLORIDE 60 MILLILITER(S): 5 INJECTION, SOLUTION INTRAVENOUS at 12:01

## 2024-05-25 NOTE — H&P ADULT - HISTORY OF PRESENT ILLNESS
58-yo Male with HTN, T2DM (on metformin), CVID with hypogammaglobulinema, schizoaffective disorder with multiple psych hospitalizations who presents with chief complaint of productive cough (green sputum) x 3 days.  Patient states cough is usually worse when he lies down.  Patient denies any recent sick contacts or travel.  Patient states feels similar to last time he had pneumonia in the past.  Patient is chronic 30-year smoker.  Denies fever/chills, chest pain, shortness of breath, abdominal pain, nausea/vomiting/diarrhea, urinary symptoms, lightheadedness/dizziness, weakness/numbness, rashes or any other symptoms at this time. 58-yo Male with HTN, HLD, hypothyroidism, polydypsia, T2DM (on metformin), CVID with hypogammaglobulinema, schizoaffective disorder with multiple psych hospitalizations who presents with chief complaint of productive cough (green sputum) x 3 days.  Patient states cough is usually worse when he lies down.  Patient denies any recent sick contacts or travel.  Patient states feels similar to last time he had pneumonia in the past.  Patient is chronic 30-year smoker.  Denies fever/chills, chest pain, shortness of breath, abdominal pain, nausea/vomiting/diarrhea, urinary symptoms, lightheadedness/dizziness, weakness/numbness, rashes or any other symptoms at this time.

## 2024-05-25 NOTE — H&P ADULT - NSHPPHYSICALEXAM_GEN_ALL_CORE
Vital Signs Last 24 Hrs  T(C): 36.2 (25 May 2024 05:30), Max: 36.9 (24 May 2024 22:25)  T(F): 97.2 (25 May 2024 05:30), Max: 98.5 (24 May 2024 22:25)  HR: 61 (25 May 2024 05:30) (61 - 70)  BP: 168/84 (25 May 2024 05:30) (165/85 - 197/99)  BP(mean): --  RR: 17 (25 May 2024 05:30) (16 - 17)  SpO2: 95% (25 May 2024 05:30) (95% - 96%)    I&O's Summary Vital Signs Last 24 Hrs  T(C): 36.2 (25 May 2024 05:30), Max: 36.9 (24 May 2024 22:25)  T(F): 97.2 (25 May 2024 05:30), Max: 98.5 (24 May 2024 22:25)  HR: 61 (25 May 2024 05:30) (61 - 70)  BP: 168/84 (25 May 2024 05:30) (165/85 - 197/99)  BP(mean): --  RR: 17 (25 May 2024 05:30) (16 - 17)  SpO2: 95% (25 May 2024 05:30) (95% - 96%)    I&O's Summary    GENERAL: NAD  HEAD:  Atraumatic, Normocephalic  EYES: EOMI, PERRLA, conjunctiva and sclera clear  MOUTH/THROAT: No swelling, no erythema, no lesions, no exudates; partially missing dentition  NECK: Supple, No JVD, No LAD, No carotid bruits, No thyromegaly  CHEST/LUNG: Clear to auscultation bilaterally; Normal respiratory effort w/o intercostal retractions  HEART: Regular rate and rhythm; nl S1/S2; No murmurs, rubs, or gallops  ABDOMEN: Soft, Nontender, Nondistended; Bowel sounds present; No hepatosplenomegaly  EXTREMITIES:  2+ Peripheral Pulses; No clubbing, cyanosis, or edema b/l; Nl ROM  SKIN: No rashes or lesions; No jaundice; Normal turgor, texture  NEURO: A+O x 3; nonfocal CN/motor/sensory/reflexes; speech + comprehension are intact  PSYCH: Flat affect; no delirium or agitation; no suicidal/homicidal ideation

## 2024-05-25 NOTE — H&P ADULT - NSHPREVIEWOFSYSTEMS_GEN_ALL_CORE
REVIEW OF SYSTEMS:    CONSTITUTIONAL: (-) dizziness (-) weakness (-) fevers (-) chills (-) weight loss (-) weight gain (-) sweats (-) poor appetite (-) falls (-) syncope  HEENT: (-) visual changes (-) HA (-) vertigo (-) rhinorrhea (-) epistaxis (-) swelling (-) hearing changes (-) sore throat (-) hoarseness  NECK: (-) stiffness (-) pain  RESPIRATORY: (+) cough (-) SOB (-) HESTER (-) hemoptysis   CARDIOVASCULAR: (-) chest pain (-) palpitations (-) PND (-) orthopnea  GASTROINTESTINAL: (-) abd pain (-) nausea (-) vomiting (-) diarrhea (-) constipation (-) hematemesis (-) melena (-) BRBPR (-) dysphagia (-) odynophagia (-) incontinence (-) bloatedness  GENITOURINARY: (-) dysuria  (-) frequency (-) hematuria (-) incontinence (-) abnormal discharge (-) incomplete emptying (-) flank pain  NEUROLOGICAL: (-) confusion (-) slurred speech (-) focal weakness (-) tingling/numbness (-) difficulty walking (-) tremors  MUSCULOSKELETAL: (-) back pain (-) joint pain (-) joint swelling (-) reduced ROM (-) extremity pain (-) extremity swelling  PSYCH: (-) anxious (-) depressed (-)agitation (-) aural hallucinations (-) visual hallucinations  SKIN: (-) itching (-) burning (-) rashes (-) swelling (-) bruising (-) pain  All other review of systems is negative unless indicated above.

## 2024-05-25 NOTE — ED ADULT NURSE NOTE - OBJECTIVE STATEMENT
Attending Attestation (For Attendings USE Only)... Pt received to intake 3, A&O x 4, ambulatory, pmhx of DM2, bipolar disorder, coming to ED with complaints of cough. Pt reports having 3 days of wet productive cough, phlegm green in color, worsens when laying down, active smoker (approx 1 pack/day). Pt denies HA, dizziness, chest pain, palpitations, SOB, n/v/d, urinary abnormalities, fever/chills, recent travel or sick contacts. Pt calm and cooperative, complaint with medication regimen, denies auditory/visual hallucinations, denies SI/HI. 22G IV placed to R hand, labs drawn and sent, RR equal and unlabored, awaiting results at this time.

## 2024-05-25 NOTE — CONSULT NOTE ADULT - ASSESSMENT
58-yo Male with HTN, hypothyroidism, polydypsia, T2DM (on metformin), CVID with hypogammaglobulinema, schizoaffective disorder who presented w/ productive cough (green sputum) x 3 days    COVID  symptomatic  at risk for progression to severe disease  procal negative  CXR- clear    hyponatremia  nephrology eval    Recommendations  start remdesivir x 3 day course  monitor liver enzymes while on remdesivir  no indication for steroids  trend inflammatory markers  supportive care- mucinex, flonase  isolation per infection control policy     Milton Christine M.D.  [facility], Division of Infectious Diseases  [phone]  After 5pm on weekdays and all day on weekends - please call [phone]

## 2024-05-25 NOTE — PATIENT PROFILE ADULT - FALL HARM RISK - UNIVERSAL INTERVENTIONS
Bed in lowest position, wheels locked, appropriate side rails in place/Call bell, personal items and telephone in reach/Instruct patient to call for assistance before getting out of bed or chair/Non-slip footwear when patient is out of bed/[place] to call system/Physically safe environment - no spills, clutter or unnecessary equipment/Purposeful Proactive Rounding/Room/bathroom lighting operational, light cord in reach

## 2024-05-25 NOTE — H&P ADULT - NSHPLABSRESULTS_GEN_ALL_CORE
LABS:                        12.5   5.23  )-----------( 180      ( 25 May 2024 00:57 )             35.4         122<L>  |  85<L>  |  6<L>  ----------------------------<  114<H>  4.3   |  23  |  0.71    Ca    9.6      25 May 2024 00:57    TPro  7.1  /  Alb  3.9  /  TBili  0.3  /  DBili  x   /  AST  31  /  ALT  24  /  AlkPhos  142<H>        CAPILLARY BLOOD GLUCOSE      POCT Blood Glucose.: 114 mg/dL (25 May 2024 04:51)        Urinalysis Basic - ( 25 May 2024 02:20 )    Color: Yellow / Appearance: Clear / S.006 / pH: x  Gluc: x / Ketone: Negative mg/dL  / Bili: Negative / Urobili: 0.2 mg/dL   Blood: x / Protein: 100 mg/dL / Nitrite: Negative   Leuk Esterase: Negative / RBC: 0 /HPF / WBC 0 /HPF   Sq Epi: x / Non Sq Epi: 0 /HPF / Bacteria: Negative /HPF        RADIOLOGY & ADDITIONAL TESTS:    Imaging Personally Reviewed:  [x] YES  [ ] NO    Case discussed with NPP:  [X] YES  [ ] NO

## 2024-05-25 NOTE — CONSULT NOTE ADULT - SUBJECTIVE AND OBJECTIVE BOX
[facility]   MD NORY KRISHNAMURTHY MD ANGELA WONG, PA QIAN CHEN, BRITANY      TEL:  OFFICE: [phone]  From 5pm-7am answering service [phone]    --- INITIAL RENAL CONSULT NOTE ---date of service 05-25-24 @ 13:11    HPI:  58-yo Male with HTN, HLD, hypothyroidism, polydypsia, T2DM (on metformin), CVID with hypogammaglobulinema, schizoaffective disorder with multiple psych hospitalizations who presents with chief complaint of productive cough (green sputum) x 3 days.  Patient states cough is usually worse when he lies down.  Patient denies any recent sick contacts or travel.  Patient states feels similar to last time he had pneumonia in the past.  Patient is chronic 30-year smoker.  Denies fever/chills, chest pain, shortness of breath, abdominal pain, nausea/vomiting/diarrhea, urinary symptoms, lightheadedness/dizziness.  He endorsed generalized weakness for the past few days prior to coming to hospital.      Allergies:  penicillin (Rash)  amoxicillin (Fever)      PAST MEDICAL & SURGICAL HISTORY:  Smoker    DM (diabetes mellitus)    HTN (hypertension)    Abscess of finger    Bipolar disorder    Chronic hyponatremia    CVID (common variable immunodeficiency)    Hyponatremia    Smoker    Deviated septum    Loss of teeth due to extraction  All teeth due to dental carries        Home Medications Reviewed    Hospital Medications:   MEDICATIONS  (STANDING):  atorvastatin 40 milliGRAM(s) Oral at bedtime  dextrose 10% Bolus 125 milliLiter(s) IV Bolus once  dextrose 5%. 1000 milliLiter(s) (100 mL/Hr) IV Continuous <Continuous>  dextrose 5%. 1000 milliLiter(s) (50 mL/Hr) IV Continuous <Continuous>  dextrose 50% Injectable 25 Gram(s) IV Push once  dextrose 50% Injectable 12.5 Gram(s) IV Push once  glucagon  Injectable 1 milliGRAM(s) IntraMuscular once  heparin   Injectable 5000 Unit(s) SubCutaneous every 8 hours  insulin lispro (ADMELOG) corrective regimen sliding scale   SubCutaneous at bedtime  insulin lispro (ADMELOG) corrective regimen sliding scale   SubCutaneous three times a day before meals  labetalol 200 milliGRAM(s) Oral two times a day  levothyroxine 50 MICROGram(s) Oral daily  losartan 100 milliGRAM(s) Oral daily  remdesivir  IVPB   IV Intermittent   sodium chloride 1.5%. 500 milliLiter(s) (60 mL/Hr) IV Continuous <Continuous>      SOCIAL HISTORY:  Denies ETOh, Smoking,     FAMILY HISTORY:  Family history of throat cancer (Father)    Family history of leukemia (Mother)      REVIEW OF SYSTEMS:  CONSTITUTIONAL: + weakness.  No fevers or chills  EYES/ENT: No visual changes;  No vertigo or throat pain   NECK: No pain or stiffness  RESPIRATORY: + productive cough.  No wheezing, hemoptysis.  No shortness of breath  CARDIOVASCULAR: No chest pain or palpitations.  GASTROINTESTINAL: No abdominal or epigastric pain. No nausea, vomiting, or hematemesis; No diarrhea or constipation. No melena or hematochezia.  GENITOURINARY: No dysuria, frequency, foamy urine, urinary urgency, incontinence or hematuria  NEUROLOGICAL: No numbness or weakness  SKIN: No itching, burning, rashes, or lesions   VASCULAR: No bilateral lower extremity edema.   All other review of systems is negative unless indicated above.    VITALS:  T(F): 97.2 (05-25-24 @ 05:30), Max: 98.5 (05-24-24 @ 22:25)  HR: 61 (05-25-24 @ 05:30)  BP: 168/84 (05-25-24 @ 05:30)  RR: 17 (05-25-24 @ 05:30)  SpO2: 95% (05-25-24 @ 05:30)  Wt(kg): --      PHYSICAL EXAM:  General: NAD  HEENT: anicteric sclera, oropharynx clear, MMM  Neck: No JVD  Respiratory: CTAB, no wheezes, rales or rhonchi  Cardiovascular: S1, S2, RRR  Gastrointestinal: BS+, soft, NT/ND  Extremities: No cyanosis or clubbing. No peripheral edema  Neurological: A/O x 3, no focal deficits  Psychiatric: Normal mood, normal affect  : No CVA tenderness. No arceo.   Skin: No rashes    LABS:  05-25    127<L>  |  89<L>  |  6<L>  ----------------------------<  109<H>  4.3   |  26  |  0.77    Ca    9.4      25 May 2024 09:07  Phos  4.0     05-25  Mg     1.80     05-25    TPro  7.2  /  Alb  4.0  /  TBili  0.4  /  DBili  <0.2  /  AST  33  /  ALT  23  /  AlkPhos  158<H>  05-25    Creatinine Trend: 0.77 <--, 0.71 <--                        12.5   5.23  )-----------( 180      ( 25 May 2024 00:57 )             35.4     Urine Studies:  Urinalysis Basic - ( 25 May 2024 09:07 )    Color:  / Appearance:  / SG:  / pH:   Gluc: 109 mg/dL / Ketone:   / Bili:  / Urobili:    Blood:  / Protein:  / Nitrite:    Leuk Esterase:  / RBC:  / WBC    Sq Epi:  / Non Sq Epi:  / Bacteria:       Sodium, Random Urine: 46 mmol/L (05-25 @ 12:04)  Creatinine, Random Urine: 35 mg/dL (05-25 @ 12:04)  Osmolality, Random Urine: 197 mosm/kg (05-25 @ 12:04)  Sodium, Random Urine: 49 mmol/L (05-25 @ 12:04)      RADIOLOGY & ADDITIONAL STUDIES:    
[facility], Division of Infectious Diseases  MEGHANA Sosa S. Shah, Y. Patel, G. Emmett  [phone]  ([phone] - weekdays after 5pm and weekends)    DAT WOOD  58y, Male  [id number]    HPI--  HPI:  58-yo Male with HTN, HLD, hypothyroidism, polydypsia, T2DM (on metformin), CVID with hypogammaglobulinema, schizoaffective disorder with multiple psych hospitalizations who presents with chief complaint of productive cough (green sputum) x 3 days.  Patient states cough is usually worse when he lies down.  Patient denies any recent sick contacts or travel.  Patient states feels similar to last time he had pneumonia in the past.  Patient is chronic 30-year smoker.  Denies fever/chills, chest pain, shortness of breath, abdominal pain, nausea/vomiting/diarrhea, urinary symptoms, lightheadedness/dizziness, weakness/numbness, rashes or any other symptoms at this time. (25 May 2024 06:59)    ROS: 10 point review of systems completed, pertinent positives and negatives as per HPI.    Allergies: penicillin (Rash)  amoxicillin (Fever)    PMH -- Bipolar 1 disorder    IgG deficiency    Tracheal/bronchial disease    Dental caries    Bronchiectasis    Smoker    DM (diabetes mellitus)    Dental caries    HTN (hypertension)    Hypertriglyceridemia    Hypogammaglobulinemia    Abscess of finger    Bipolar disorder    Chronic hyponatremia    CVID (common variable immunodeficiency)    Hyponatremia    Smoker      PSH -- No significant past surgical history    Deviated septum    Loss of teeth due to extraction      FH -- Family history of throat cancer (Father)    Family history of leukemia (Mother)      Social History -- smoker    Physical Exam--  Vital Signs Last 24 Hrs  T(F): 97.2 (25 May 2024 05:30), Max: 98.5 (24 May 2024 22:25)  HR: 61 (25 May 2024 05:30) (61 - 70)  BP: 168/84 (25 May 2024 05:30) (165/85 - 197/99)  RR: 17 (25 May 2024 05:30) (16 - 17)  SpO2: 95% (25 May 2024 05:30) (95% - 96%)  General: nontoxic-appearing, no acute distress  HEENT: anicteric  Lungs: Clear bilaterally without rales  Heart: S1, S2, normal rate.   Abdomen: Soft. Nondistended. Nontender.   Neuro: no obvious focal deficits   Extremities: trace LE edema.   Skin: Warm. Dry. No rash.  Psychiatric: Appropriate affect and mood for situation.     Laboratory & Imaging Data--  CBC:                       12.5   5.23  )-----------( 180      ( 25 May 2024 00:57 )             35.4     WBC Count: 5.23 K/uL (- @ 00:57)    CMP:     122<L>  |  85<L>  |  6<L>  ----------------------------<  114<H>  4.3   |  23  |  0.71    Ca    9.6      25 May 2024 00:57    TPro  7.1  /  Alb  3.9  /  TBili  0.3  /  DBili  x   /  AST  31  /  ALT  24  /  AlkPhos  142<H>      LIVER FUNCTIONS - ( 25 May 2024 00:57 )  Alb: 3.9 g/dL / Pro: 7.1 g/dL / ALK PHOS: 142 U/L / ALT: 24 U/L / AST: 31 U/L / GGT: x           Urinalysis Basic - ( 25 May 2024 02:20 )    Color: Yellow / Appearance: Clear / S.006 / pH: x  Gluc: x / Ketone: Negative mg/dL  / Bili: Negative / Urobili: 0.2 mg/dL   Blood: x / Protein: 100 mg/dL / Nitrite: Negative   Leuk Esterase: Negative / RBC: 0 /HPF / WBC 0 /HPF   Sq Epi: x / Non Sq Epi: 0 /HPF / Bacteria: Negative /HPF      Microbiology:       Radiology--  ***  Active Medications--  atorvastatin 40 milliGRAM(s) Oral at bedtime  dextrose 10% Bolus 125 milliLiter(s) IV Bolus once  dextrose 5%. 1000 milliLiter(s) IV Continuous <Continuous>  dextrose 5%. 1000 milliLiter(s) IV Continuous <Continuous>  dextrose 50% Injectable 25 Gram(s) IV Push once  dextrose 50% Injectable 12.5 Gram(s) IV Push once  dextrose Oral Gel 15 Gram(s) Oral once PRN  glucagon  Injectable 1 milliGRAM(s) IntraMuscular once  heparin   Injectable 5000 Unit(s) SubCutaneous every 8 hours  insulin lispro (ADMELOG) corrective regimen sliding scale   SubCutaneous at bedtime  insulin lispro (ADMELOG) corrective regimen sliding scale   SubCutaneous three times a day before meals  labetalol 200 milliGRAM(s) Oral two times a day  levothyroxine 50 MICROGram(s) Oral daily  losartan 100 milliGRAM(s) Oral daily  remdesivir  IVPB   IV Intermittent     Antimicrobials:   remdesivir  IVPB   IV Intermittent     Immunologic:

## 2024-05-25 NOTE — CONSULT NOTE ADULT - ASSESSMENT
58-yo Male with HTN, HLD, hypothyroidism, polydypsia, T2DM (on metformin), CVID with hypogammaglobulinema, schizoaffective disorder with multiple psych hospitalizations who presents with chief complaint of productive cough (green sputum) x 3 days.  Nephrology consulted for hyponatremia.    A/P:  Hyponatremia:  Na low but improving today.  Repeat urine studies suggests polydipsia.  Pt reports he drinks 3-4L of tea/day.  Fluid restriction to 1.5L advised - pt educated.  Started NS 1.5% @60mL/hr x6hrs given today.  Repeat BMP @ 1600pm.  Call Dr. Payne w/ results.  Monitor Na.  Avoid over correction >8mEq/24hrs.    HTN:  BP suboptimal.  Currently on labetalol 200mg BID and losartan 100mg qd.  Start Amlodipine 5mg qd.  Smoking cessation advised.  Low salt diet.  Monitor BP.    Proteinuria:  Check UPr/Cr  Likely in setting of HTN.  Can be worked up outpatient.   58-yo Male with HTN, HLD, hypothyroidism, polydypsia, T2DM (on metformin), CVID with hypogammaglobulinema, schizoaffective disorder with multiple psych hospitalizations who presents with chief complaint of productive cough (green sputum) x 3 days.  Nephrology consulted for hyponatremia.    A/P:  Hyponatremia:  Na low but improving today.  Repeat urine studies suggests polydipsia.  Pt reports he drinks 3-4L of tea/day.  Fluid restriction to 1.5L advised - pt educated.  Started NS 1.5% @60mL/hr x6hrs --> switch to NaCl 1gm TID x2 days.  Repeat BMP @ 1600pm.  Call Dr. Payne w/ results.  Monitor Na.  Avoid over correction >8mEq/24hrs.    HTN:  BP suboptimal.  Currently on labetalol 200mg BID and losartan 100mg qd.  Start Amlodipine 5mg qd.  Smoking cessation advised.  Low salt diet.  Monitor BP.    Proteinuria:  Check UPr/Cr  Likely in setting of HTN.  Can be worked up outpatient.

## 2024-05-25 NOTE — H&P ADULT - ASSESSMENT
58-yo Male with HTN, T2DM (on metformin), CVID with hypogammaglobulinema, schizoaffective disorder with multiple psych hospitalizations who is admitted with hyponatremia and (+)COVID-19. 58-yo Male with HTN, HLD, hypothyroidism, polydypsia, T2DM (on metformin), CVID with hypogammaglobulinema, schizoaffective disorder with multiple psych hospitalizations who is admitted with hyponatremia and (+)COVID-19.    Hyponatremia  - has prior hx of polydyspsia  - 1L fluid restriction  - spot urine Na and Cr  - check TSH  - repeat BMP 10 AM ordered  - appreciate nephrology    COVID-19 positivity, symptomatic  - saturating well on RA  - at high-risk for progression to severe symptoms  - contact/droplet precautions  - remdesivir 5-day protocol initiated  - no clinical indications for solumedrol  - appreciate ID    DM2 with unspecified complications  - consistent carbohydrate diet  - ISS  - hold metformin  - Hgb1ac -->    Schizoaffective disorder  - on q4 weekly abilify 400 mg IM injections  - next dose should be 5/31/24    HTN  - cont labetalol  - cont losartan    HLD  - cont statin    Hypothyroidism, unspecified  - cont synthroid    Need for prophylaxis  - heparin 5000 units SC q8h

## 2024-05-25 NOTE — ED ADULT NURSE REASSESSMENT NOTE - NS ED NURSE REASSESS COMMENT FT1
Pt resting in stretcher, at baseline orientation, offers no complaints of pain or discomfort at this time, RR equal and unlabored, calm and cooperative, safety maintained, comfort provided, awaiting bed placement at this time.

## 2024-05-26 LAB
A1C WITH ESTIMATED AVERAGE GLUCOSE RESULT: 5.7 % — HIGH (ref 4–5.6)
ALBUMIN SERPL ELPH-MCNC: 3.8 G/DL — SIGNIFICANT CHANGE UP (ref 3.3–5)
ALP SERPL-CCNC: 155 U/L — HIGH (ref 40–120)
ALT FLD-CCNC: 23 U/L — SIGNIFICANT CHANGE UP (ref 4–41)
ANION GAP SERPL CALC-SCNC: 11 MMOL/L — SIGNIFICANT CHANGE UP (ref 7–14)
AST SERPL-CCNC: 33 U/L — SIGNIFICANT CHANGE UP (ref 4–40)
BASOPHILS # BLD AUTO: 0.03 K/UL — SIGNIFICANT CHANGE UP (ref 0–0.2)
BASOPHILS NFR BLD AUTO: 0.6 % — SIGNIFICANT CHANGE UP (ref 0–2)
BILIRUB DIRECT SERPL-MCNC: <0.2 MG/DL — SIGNIFICANT CHANGE UP (ref 0–0.3)
BILIRUB INDIRECT FLD-MCNC: >0.1 MG/DL — SIGNIFICANT CHANGE UP (ref 0–1)
BILIRUB SERPL-MCNC: 0.3 MG/DL — SIGNIFICANT CHANGE UP (ref 0.2–1.2)
BUN SERPL-MCNC: 11 MG/DL — SIGNIFICANT CHANGE UP (ref 7–23)
CALCIUM SERPL-MCNC: 9.2 MG/DL — SIGNIFICANT CHANGE UP (ref 8.4–10.5)
CHLORIDE SERPL-SCNC: 95 MMOL/L — LOW (ref 98–107)
CO2 SERPL-SCNC: 25 MMOL/L — SIGNIFICANT CHANGE UP (ref 22–31)
CREAT SERPL-MCNC: 0.76 MG/DL — SIGNIFICANT CHANGE UP (ref 0.5–1.3)
CREAT SERPL-MCNC: 0.78 MG/DL — SIGNIFICANT CHANGE UP (ref 0.5–1.3)
EGFR: 105 ML/MIN/1.73M2 — SIGNIFICANT CHANGE UP
EGFR: 105 ML/MIN/1.73M2 — SIGNIFICANT CHANGE UP
EOSINOPHIL # BLD AUTO: 0.16 K/UL — SIGNIFICANT CHANGE UP (ref 0–0.5)
EOSINOPHIL NFR BLD AUTO: 3.2 % — SIGNIFICANT CHANGE UP (ref 0–6)
ESTIMATED AVERAGE GLUCOSE: 117 — SIGNIFICANT CHANGE UP
GLUCOSE SERPL-MCNC: 87 MG/DL — SIGNIFICANT CHANGE UP (ref 70–99)
HCT VFR BLD CALC: 42 % — SIGNIFICANT CHANGE UP (ref 39–50)
HGB BLD-MCNC: 13.8 G/DL — SIGNIFICANT CHANGE UP (ref 13–17)
IANC: 3.23 K/UL — SIGNIFICANT CHANGE UP (ref 1.8–7.4)
IMM GRANULOCYTES NFR BLD AUTO: 0.4 % — SIGNIFICANT CHANGE UP (ref 0–0.9)
INR BLD: 1 RATIO — SIGNIFICANT CHANGE UP (ref 0.85–1.18)
LYMPHOCYTES # BLD AUTO: 0.81 K/UL — LOW (ref 1–3.3)
LYMPHOCYTES # BLD AUTO: 16.4 % — SIGNIFICANT CHANGE UP (ref 13–44)
MCHC RBC-ENTMCNC: 27.8 PG — SIGNIFICANT CHANGE UP (ref 27–34)
MCHC RBC-ENTMCNC: 32.9 GM/DL — SIGNIFICANT CHANGE UP (ref 32–36)
MCV RBC AUTO: 84.7 FL — SIGNIFICANT CHANGE UP (ref 80–100)
MONOCYTES # BLD AUTO: 0.69 K/UL — SIGNIFICANT CHANGE UP (ref 0–0.9)
MONOCYTES NFR BLD AUTO: 14 % — SIGNIFICANT CHANGE UP (ref 2–14)
NEUTROPHILS # BLD AUTO: 3.23 K/UL — SIGNIFICANT CHANGE UP (ref 1.8–7.4)
NEUTROPHILS NFR BLD AUTO: 65.4 % — SIGNIFICANT CHANGE UP (ref 43–77)
NRBC # BLD: 0 /100 WBCS — SIGNIFICANT CHANGE UP (ref 0–0)
NRBC # FLD: 0 K/UL — SIGNIFICANT CHANGE UP (ref 0–0)
PLATELET # BLD AUTO: 180 K/UL — SIGNIFICANT CHANGE UP (ref 150–400)
POTASSIUM SERPL-MCNC: 4.5 MMOL/L — SIGNIFICANT CHANGE UP (ref 3.5–5.3)
POTASSIUM SERPL-SCNC: 4.5 MMOL/L — SIGNIFICANT CHANGE UP (ref 3.5–5.3)
PROT SERPL-MCNC: 7.2 G/DL — SIGNIFICANT CHANGE UP (ref 6–8.3)
PROTHROM AB SERPL-ACNC: 11.3 SEC — SIGNIFICANT CHANGE UP (ref 9.5–13)
RBC # BLD: 4.96 M/UL — SIGNIFICANT CHANGE UP (ref 4.2–5.8)
RBC # FLD: 14 % — SIGNIFICANT CHANGE UP (ref 10.3–14.5)
SODIUM SERPL-SCNC: 131 MMOL/L — LOW (ref 135–145)
WBC # BLD: 4.94 K/UL — SIGNIFICANT CHANGE UP (ref 3.8–10.5)
WBC # FLD AUTO: 4.94 K/UL — SIGNIFICANT CHANGE UP (ref 3.8–10.5)

## 2024-05-26 RX ORDER — SODIUM CHLORIDE 0.65 %
1 AEROSOL, SPRAY (ML) NASAL EVERY 6 HOURS
Refills: 0 | Status: DISCONTINUED | OUTPATIENT
Start: 2024-05-26 | End: 2024-05-28

## 2024-05-26 RX ADMIN — LOSARTAN POTASSIUM 100 MILLIGRAM(S): 100 TABLET, FILM COATED ORAL at 06:29

## 2024-05-26 RX ADMIN — Medication 200 MILLIGRAM(S): at 06:20

## 2024-05-26 RX ADMIN — HEPARIN SODIUM 5000 UNIT(S): 5000 INJECTION INTRAVENOUS; SUBCUTANEOUS at 14:43

## 2024-05-26 RX ADMIN — Medication 200 MILLIGRAM(S): at 17:51

## 2024-05-26 RX ADMIN — Medication 1 SPRAY(S): at 23:43

## 2024-05-26 RX ADMIN — AMLODIPINE BESYLATE 5 MILLIGRAM(S): 2.5 TABLET ORAL at 06:21

## 2024-05-26 RX ADMIN — REMDESIVIR 200 MILLIGRAM(S): 5 INJECTION INTRAVENOUS at 11:21

## 2024-05-26 RX ADMIN — SODIUM CHLORIDE 1 GRAM(S): 9 INJECTION INTRAMUSCULAR; INTRAVENOUS; SUBCUTANEOUS at 06:21

## 2024-05-26 RX ADMIN — ATORVASTATIN CALCIUM 40 MILLIGRAM(S): 80 TABLET, FILM COATED ORAL at 21:18

## 2024-05-26 RX ADMIN — HEPARIN SODIUM 5000 UNIT(S): 5000 INJECTION INTRAVENOUS; SUBCUTANEOUS at 21:18

## 2024-05-26 RX ADMIN — SODIUM CHLORIDE 1 GRAM(S): 9 INJECTION INTRAMUSCULAR; INTRAVENOUS; SUBCUTANEOUS at 14:43

## 2024-05-26 RX ADMIN — Medication 50 MICROGRAM(S): at 06:21

## 2024-05-26 RX ADMIN — Medication 1 SPRAY(S): at 17:51

## 2024-05-26 RX ADMIN — Medication 1 SPRAY(S): at 11:21

## 2024-05-26 RX ADMIN — HEPARIN SODIUM 5000 UNIT(S): 5000 INJECTION INTRAVENOUS; SUBCUTANEOUS at 06:20

## 2024-05-26 RX ADMIN — SODIUM CHLORIDE 1 GRAM(S): 9 INJECTION INTRAMUSCULAR; INTRAVENOUS; SUBCUTANEOUS at 21:18

## 2024-05-26 NOTE — PROGRESS NOTE ADULT - ASSESSMENT
58-yo Male with HTN, hypothyroidism, polydypsia, T2DM (on metformin), CVID with hypogammaglobulinema, schizoaffective disorder who presented w/ productive cough (green sputum) x 3 days    COVID  symptomatic  at risk for progression to severe disease  procal negative  CXR- clear    hyponatremia  nephrology eval    Recommendations  c/w remdesivir x 3 day course w/ last day tomorrow  monitor liver enzymes while on remdesivir  no indication for steroids  trend inflammatory markers  supportive care- mucinex, flonase  isolation per infection control policy     Milton Christine M.D.  [facility], Division of Infectious Diseases  [phone]  After 5pm on weekdays and all day on weekends - please call [phone]  58-yo Male with HTN, hypothyroidism, polydypsia, T2DM (on metformin), CVID with hypogammaglobulinema, schizoaffective disorder who presented w/ productive cough (green sputum) x 3 days    COVID  symptomatic  at risk for progression to severe disease  procal negative  CXR- clear    hyponatremia  nephrology following    Recommendations  c/w remdesivir x 3 day course w/ last day tomorrow  monitor liver enzymes while on remdesivir  no indication for steroids  trend inflammatory markers  supportive care- mucinex, flonase  isolation per infection control policy     Milton Christine M.D.  [facility], Division of Infectious Diseases  [phone]  After 5pm on weekdays and all day on weekends - please call [phone]

## 2024-05-26 NOTE — PROGRESS NOTE ADULT - SUBJECTIVE AND OBJECTIVE BOX
[facility], Division of Infectious Diseases  MEGHANA Sosa Y. Patel, S. Shah, G. Emmett  [phone]  ([phone] - weekdays after 5pm and weekends)    Name: DAT WOOD  Age/Gender: 58y Male  MRN: [id number]    Interval History:  Notes reviewed.   No concerning overnight events.  Afebrile.   nasal congestion improved    Allergies: penicillin (Rash)  amoxicillin (Fever)      Objective:  Vitals:   T(F): 98.5 (05-25-24 @ 22:10), Max: 98.5 (05-25-24 @ 22:10)  HR: 67 (05-25-24 @ 22:10) (67 - 74)  BP: 131/89 (05-25-24 @ 22:10) (131/89 - 170/84)  RR: 17 (05-25-24 @ 22:10) (17 - 18)  SpO2: 95% (05-25-24 @ 22:10) (95% - 97%)  Physical Examination:  General: no acute distress  HEENT: anicteric  Cardio: normal rate  Resp: breathing comfortably on RA   Abd: soft, NT, ND  Ext: no LE edema  Skin: warm, dry    Laboratory Studies:  CBC:                       12.5   5.23  )-----------( 180      ( 25 May 2024 00:57 )             35.4     WBC Trend:  5.23 05-25-24 @ 00:57    CMP: 05-25    129<L>  |  91<L>  |  10  ----------------------------<  94  4.4   |  25  |  0.90    Ca    9.1      25 May 2024 15:20  Phos  4.0     05-25  Mg     1.80     05-25    TPro  7.2  /  Alb  4.0  /  TBili  0.4  /  DBili  <0.2  /  AST  33  /  ALT  23  /  AlkPhos  158<H>  05-25      LIVER FUNCTIONS - ( 25 May 2024 09:07 )  Alb: 4.0 g/dL / Pro: 7.2 g/dL / ALK PHOS: 158 U/L / ALT: 23 U/L / AST: 33 U/L / GGT: x             Urinalysis Basic - ( 25 May 2024 15:20 )    Color: x / Appearance: x / SG: x / pH: x  Gluc: 94 mg/dL / Ketone: x  / Bili: x / Urobili: x   Blood: x / Protein: x / Nitrite: x   Leuk Esterase: x / RBC: x / WBC x   Sq Epi: x / Non Sq Epi: x / Bacteria: x      Microbiology: reviewed       Radiology: reviewed     Medications:  amLODIPine   Tablet 5 milliGRAM(s) Oral daily  atorvastatin 40 milliGRAM(s) Oral at bedtime  dextrose 10% Bolus 125 milliLiter(s) IV Bolus once  dextrose 5%. 1000 milliLiter(s) IV Continuous <Continuous>  dextrose 5%. 1000 milliLiter(s) IV Continuous <Continuous>  dextrose 50% Injectable 12.5 Gram(s) IV Push once  dextrose 50% Injectable 25 Gram(s) IV Push once  dextrose Oral Gel 15 Gram(s) Oral once PRN  glucagon  Injectable 1 milliGRAM(s) IntraMuscular once  heparin   Injectable 5000 Unit(s) SubCutaneous every 8 hours  insulin lispro (ADMELOG) corrective regimen sliding scale   SubCutaneous at bedtime  insulin lispro (ADMELOG) corrective regimen sliding scale   SubCutaneous three times a day before meals  labetalol 200 milliGRAM(s) Oral two times a day  levothyroxine 50 MICROGram(s) Oral daily  losartan 100 milliGRAM(s) Oral daily  remdesivir  IVPB   IV Intermittent   remdesivir  IVPB 100 milliGRAM(s) IV Intermittent every 24 hours  sodium chloride 1 Gram(s) Oral three times a day  sodium chloride 0.65% Nasal 1 Spray(s) Both Nostrils every 6 hours    Antimicrobials:  remdesivir  IVPB 100 milliGRAM(s) IV Intermittent every 24 hours  remdesivir  IVPB   IV Intermittent

## 2024-05-26 NOTE — PROGRESS NOTE ADULT - SUBJECTIVE AND OBJECTIVE BOX
Still occasionally bringing up green-colored sputum  No fevers overnight  Saturating well on RA    Vital Signs Last 24 Hrs  T(C): 36.8 (26 May 2024 06:20), Max: 36.9 (25 May 2024 22:10)  T(F): 98.2 (26 May 2024 06:20), Max: 98.5 (25 May 2024 22:10)  HR: 71 (26 May 2024 06:20) (67 - 74)  BP: 158/64 (26 May 2024 06:20) (131/89 - 170/84)  BP(mean): --  RR: 18 (26 May 2024 06:20) (17 - 18)  SpO2: 96% (26 May 2024 06:20) (95% - 97%)    I&O's Summary      GENERAL: NAD  HEAD: HEENT, EOMI  MOUTH: Partially missing dentition  NECK: Supple, No JVD, No LAD, No carotid bruits, No thyromegaly  CHEST/LUNG: Clear to auscultation bilaterally; Normal respiratory effort w/o intercostal retractions  HEART: Regular rate and rhythm; nl S1/S2; No murmurs, rubs, or gallops  ABDOMEN: Soft, Nontender, Nondistended; Bowel sounds present; No hepatosplenomegaly  EXTREMITIES:  2+ Peripheral Pulses; No clubbing, cyanosis, or edema b/l; Nl ROM  SKIN: No rashes or lesions; No jaundice; Normal turgor, texture  NEURO: A+O x 3; nonfocal CN/motor/sensory/reflexes; speech + comprehension are intact  PSYCH: Flat affect; no delirium or agitation; no suicidal/homicidal ideation    LABS:                        12.5   5.23  )-----------( 180      ( 25 May 2024 00:57 )             35.4     05-25    129<L>  |  91<L>  |  10  ----------------------------<  94  4.4   |  25  |  0.90    Ca    9.1      25 May 2024 15:20  Phos  4.0     05-25  Mg     1.80     05-25    TPro  7.2  /  Alb  4.0  /  TBili  0.4  /  DBili  <0.2  /  AST  33  /  ALT  23  /  AlkPhos  158<H>  05-25    PT/INR - ( 25 May 2024 08:55 )   PT: 11.6 sec;   INR: 1.04 ratio           CAPILLARY BLOOD GLUCOSE      POCT Blood Glucose.: 99 mg/dL (25 May 2024 22:24)  POCT Blood Glucose.: 94 mg/dL (25 May 2024 17:38)  POCT Blood Glucose.: 101 mg/dL (25 May 2024 12:24)  POCT Blood Glucose.: 116 mg/dL (25 May 2024 08:41)        Urinalysis Basic - ( 25 May 2024 15:20 )    Color: x / Appearance: x / SG: x / pH: x  Gluc: 94 mg/dL / Ketone: x  / Bili: x / Urobili: x   Blood: x / Protein: x / Nitrite: x   Leuk Esterase: x / RBC: x / WBC x   Sq Epi: x / Non Sq Epi: x / Bacteria: x        RADIOLOGY & ADDITIONAL TESTS:    Imaging Personally Reviewed:  [x] YES  [ ] NO    Case discussed with NPP:  [X] YES  [ ] NO

## 2024-05-26 NOTE — PROGRESS NOTE ADULT - SUBJECTIVE AND OBJECTIVE BOX
[facility] [place] [facility]   MD NORY KRISHNAMURTHY MD RUORU WONG, PA CHEN QIAN NP    TEL:  OFFICE: [phone]      RENAL FOLLOW UP NOTE-- Date of Service ;; 05-26-24 @ 12:02  --------------------------------------------------------------------------------  HPI:  Pt seen and examined at bedside  Denies SOB, chest pain.         PAST HISTORY  --------------------------------------------------------------------------------  No significant changes to PMH, PSH, FHx, SHx, unless otherwise noted    ALLERGIES & MEDICATIONS  --------------------------------------------------------------------------------  Allergies    penicillin (Rash)  amoxicillin (Fever)    Intolerances      Standing Inpatient Medications  amLODIPine   Tablet 5 milliGRAM(s) Oral daily  atorvastatin 40 milliGRAM(s) Oral at bedtime  dextrose 10% Bolus 125 milliLiter(s) IV Bolus once  dextrose 5%. 1000 milliLiter(s) IV Continuous <Continuous>  dextrose 5%. 1000 milliLiter(s) IV Continuous <Continuous>  dextrose 50% Injectable 25 Gram(s) IV Push once  dextrose 50% Injectable 12.5 Gram(s) IV Push once  glucagon  Injectable 1 milliGRAM(s) IntraMuscular once  heparin   Injectable 5000 Unit(s) SubCutaneous every 8 hours  insulin lispro (ADMELOG) corrective regimen sliding scale   SubCutaneous three times a day before meals  insulin lispro (ADMELOG) corrective regimen sliding scale   SubCutaneous at bedtime  labetalol 200 milliGRAM(s) Oral two times a day  levothyroxine 50 MICROGram(s) Oral daily  losartan 100 milliGRAM(s) Oral daily  remdesivir  IVPB   IV Intermittent   remdesivir  IVPB 100 milliGRAM(s) IV Intermittent every 24 hours  sodium chloride 1 Gram(s) Oral three times a day  sodium chloride 0.65% Nasal 1 Spray(s) Both Nostrils every 6 hours    PRN Inpatient Medications  dextrose Oral Gel 15 Gram(s) Oral once PRN      REVIEW OF SYSTEMS  --------------------------------------------------------------------------------  General: no fever  CVS: no chest pain  RESP: no sob, no cough  ABD: no abdominal pain  : no dysuria,  MSK: no edema     VITALS/PHYSICAL EXAM  --------------------------------------------------------------------------------  T(C): 36.8 (05-26-24 @ 06:20), Max: 36.9 (05-25-24 @ 22:10)  HR: 71 (05-26-24 @ 06:20) (67 - 74)  BP: 158/64 (05-26-24 @ 06:20) (131/89 - 170/84)  RR: 18 (05-26-24 @ 06:20) (17 - 18)  SpO2: 96% (05-26-24 @ 06:20) (95% - 97%)  Wt(kg): --        Physical Exam:  	Gen: NAD  	HEENT: MMM  	Pulm: CTA B/L  	CV: S1S2  	Abd: Soft, +BS  	Ext: No LE edema B/L                      Neuro: Awake , alert  	Skin: Warm and Dry   	Vascular access: None            LABS/STUDIES  --------------------------------------------------------------------------------              13.8   4.94  >-----------<  180      [05-26-24 @ 06:00]              42.0     131  |  95  |  11  ----------------------------<  87      [05-26-24 @ 06:00]  4.5   |  25  |  0.76        Ca     9.2     [05-26-24 @ 06:00]      Mg     1.80     [05-25-24 @ 09:07]      Phos  4.0     [05-25-24 @ 09:07]    TPro  7.2  /  Alb  3.8  /  TBili  0.3  /  DBili  <0.2  /  AST  33  /  ALT  23  /  AlkPhos  155  [05-26-24 @ 06:00]    PT/INR: PT 11.3 , INR 1.00       [05-26-24 @ 06:00]      Creatinine Trend:  SCr 0.76 [05-26 @ 06:00]  SCr 0.90 [05-25 @ 15:20]  SCr 0.77 [05-25 @ 09:07]  SCr 0.71 [05-25 @ 00:57]    Urinalysis - [05-26-24 @ 06:00]      Color  / Appearance  / SG  / pH       Gluc 87 / Ketone   / Bili  / Urobili        Blood  / Protein  / Leuk Est  / Nitrite       RBC  / WBC  / Hyaline  / Gran  / Sq Epi  / Non Sq Epi  / Bacteria     Urine Creatinine 49      [05-25-24 @ 15:15]  Urine Protein 75      [05-25-24 @ 15:15]  Urine Sodium 46      [05-25-24 @ 12:04]  Urine Osmolality 197      [05-25-24 @ 12:04]    Iron 35, TIBC 237, %sat 15      [07-16-23 @ 03:00]  Ferritin 249      [07-16-23 @ 03:00]  TSH 2.25      [05-25-24 @ 00:57]  Lipid: chol 187, , HDL 37, LDL --      [03-09-24 @ 09:43]

## 2024-05-26 NOTE — PROGRESS NOTE ADULT - ASSESSMENT
58-yo Male with HTN, HLD, hypothyroidism, polydypsia, T2DM (on metformin), CVID with hypogammaglobulinema, schizoaffective disorder with multiple psych hospitalizations who is admitted with hyponatremia and (+)COVID-19.    Hyponatremia  - has prior hx of polydypsia  - 1L fluid restriction  - s/p hypertonic saline and NaCl  - TSH is normal  - daily BMP  - appreciate nephrology    COVID-19 positivity, symptomatic  - saturating well on RA  - at high-risk for progression to severe symptoms  - contact/droplet precautions  - remdesivir 3-day protocol initiated  - no clinical indications for solumedrol  - appreciate ID    DM2 with unspecified complications  - consistent carbohydrate diet  - ISS  - hold metformin  - Hgb1ac -->    Schizoaffective disorder  - on q4 weekly abilify 400 mg IM injections  - next dose should be 5/31/24    HTN  - cont labetalol  - cont losartan    HLD  - cont statin    Hypothyroidism, unspecified  - TSH is normal  - cont synthroid    Need for prophylaxis  - heparin 5000 units SC q8h

## 2024-05-26 NOTE — PROGRESS NOTE ADULT - ASSESSMENT
58-yo Male with HTN, HLD, hypothyroidism, polydipsia, T2DM (on metformin), CVID with hypogammaglobulinema, schizoaffective disorder with multiple psych hospitalizations who presents with chief complaint of productive cough (green sputum) x 3 days.  Nephrology consulted for hyponatremia.    A/P:  Hyponatremia:  Na low but improving today.  Repeat urine studies suggests polydipsia.  Pt reports he drinks 3-4L of tea/day.  Fluid restriction to 1.5L advised - pt educated  Continue salt tab  Monitor Na.  Avoid over correction >8mEq/24hrs.    HTN:  BP acceptable  Currently on labetalol 200mg BID and losartan 100mg qd.  On Amlodipine 5mg qd.  Smoking cessation advised.  Low salt diet.  Monitor BP.    Proteinuria:  Check UPr/Cr  Likely in setting of HTN.  Can be worked up outpatient.

## 2024-05-27 LAB
ALBUMIN SERPL ELPH-MCNC: 3.7 G/DL — SIGNIFICANT CHANGE UP (ref 3.3–5)
ALP SERPL-CCNC: 143 U/L — HIGH (ref 40–120)
ALT FLD-CCNC: 24 U/L — SIGNIFICANT CHANGE UP (ref 4–41)
AST SERPL-CCNC: 30 U/L — SIGNIFICANT CHANGE UP (ref 4–40)
BILIRUB DIRECT SERPL-MCNC: <0.2 MG/DL — SIGNIFICANT CHANGE UP (ref 0–0.3)
BILIRUB INDIRECT FLD-MCNC: >0.2 MG/DL — SIGNIFICANT CHANGE UP (ref 0–1)
BILIRUB SERPL-MCNC: 0.4 MG/DL — SIGNIFICANT CHANGE UP (ref 0.2–1.2)
CREAT SERPL-MCNC: 0.75 MG/DL — SIGNIFICANT CHANGE UP (ref 0.5–1.3)
EGFR: 106 ML/MIN/1.73M2 — SIGNIFICANT CHANGE UP
INR BLD: 1.11 RATIO — SIGNIFICANT CHANGE UP (ref 0.85–1.18)
PROT SERPL-MCNC: 6.9 G/DL — SIGNIFICANT CHANGE UP (ref 6–8.3)
PROTHROM AB SERPL-ACNC: 12.4 SEC — SIGNIFICANT CHANGE UP (ref 9.5–13)

## 2024-05-27 RX ADMIN — Medication 1 SPRAY(S): at 12:34

## 2024-05-27 RX ADMIN — AMLODIPINE BESYLATE 5 MILLIGRAM(S): 2.5 TABLET ORAL at 05:38

## 2024-05-27 RX ADMIN — REMDESIVIR 200 MILLIGRAM(S): 5 INJECTION INTRAVENOUS at 12:33

## 2024-05-27 RX ADMIN — ATORVASTATIN CALCIUM 40 MILLIGRAM(S): 80 TABLET, FILM COATED ORAL at 21:16

## 2024-05-27 RX ADMIN — Medication 50 MICROGRAM(S): at 05:37

## 2024-05-27 RX ADMIN — HEPARIN SODIUM 5000 UNIT(S): 5000 INJECTION INTRAVENOUS; SUBCUTANEOUS at 12:35

## 2024-05-27 RX ADMIN — Medication 1 SPRAY(S): at 18:49

## 2024-05-27 RX ADMIN — SODIUM CHLORIDE 1 GRAM(S): 9 INJECTION INTRAMUSCULAR; INTRAVENOUS; SUBCUTANEOUS at 05:37

## 2024-05-27 RX ADMIN — Medication 200 MILLIGRAM(S): at 18:48

## 2024-05-27 RX ADMIN — HEPARIN SODIUM 5000 UNIT(S): 5000 INJECTION INTRAVENOUS; SUBCUTANEOUS at 05:38

## 2024-05-27 RX ADMIN — LOSARTAN POTASSIUM 100 MILLIGRAM(S): 100 TABLET, FILM COATED ORAL at 05:37

## 2024-05-27 RX ADMIN — Medication 200 MILLIGRAM(S): at 05:37

## 2024-05-27 RX ADMIN — Medication 1 SPRAY(S): at 05:39

## 2024-05-27 RX ADMIN — HEPARIN SODIUM 5000 UNIT(S): 5000 INJECTION INTRAVENOUS; SUBCUTANEOUS at 21:16

## 2024-05-27 NOTE — PROGRESS NOTE ADULT - ASSESSMENT
58-yo Male with HTN, HLD, hypothyroidism, polydypsia, T2DM (on metformin), CVID with hypogammaglobulinema, schizoaffective disorder with multiple psych hospitalizations who is admitted with hyponatremia and (+)COVID-19.    Hyponatremia  - has prior hx of polydypsia  - 1L fluid restriction  - s/p hypertonic saline and NaCl  - TSH is normal  - daily BMP  - appreciate nephrology    COVID-19 positivity, symptomatic  - saturating well on RA  - at high-risk for progression to severe symptoms  - contact/droplet precautions  - remdesivir 3-day protocol 5/25/24 --> 5/27/24  - no clinical indications for solumedrol  - appreciate ID    DM2 with unspecified complications  - consistent carbohydrate diet  - ISS  - hold metformin  - Hgb1ac -->    Schizoaffective disorder  - on q4 weekly abilify 400 mg IM injections  - next dose should be 5/31/24    HTN  - cont labetalol  - cont losartan    HLD  - cont statin    Hypothyroidism, unspecified  - TSH is normal  - cont synthroid    Need for prophylaxis  - heparin 5000 units SC q8h

## 2024-05-27 NOTE — PROGRESS NOTE ADULT - SUBJECTIVE AND OBJECTIVE BOX
[facility], Division of Infectious Diseases  MEGHANA Sosa Y. Patel, S. Shah, G. Emmett  [phone]  ([phone] - weekdays after 5pm and weekends)    Name: DAT WOOD  Age/Gender: 58y Male  MRN: [id number]    Interval History:  Notes reviewed.   No concerning overnight events.  Afebrile.   continues to have nasal congestion    Allergies: penicillin (Rash)  amoxicillin (Fever)      Objective:  Vitals:   T(F): 98.2 (05-27-24 @ 05:00), Max: 98.4 (05-26-24 @ 22:27)  HR: 64 (05-27-24 @ 05:00) (61 - 65)  BP: 162/78 (05-27-24 @ 05:00) (145/68 - 162/78)  RR: 17 (05-27-24 @ 05:00) (17 - 18)  SpO2: 95% (05-27-24 @ 05:00) (95% - 99%)  Physical Examination:  General: no acute distress  HEENT: anicteric  Cardio: normal rate  Resp: breathing comfortably on RA   Abd: soft, NT, ND  Ext: no LE edema  Skin: warm, dry      Laboratory Studies:  CBC:                       13.8   4.94  )-----------( 180      ( 26 May 2024 06:00 )             42.0     WBC Trend:  4.94 05-26-24 @ 06:00  5.23 05-25-24 @ 00:57    CMP: 05-26    131<L>  |  95<L>  |  11  ----------------------------<  87  4.5   |  25  |  0.76    Ca    9.2      26 May 2024 06:00  Phos  4.0     05-25  Mg     1.80     05-25    TPro  7.2  /  Alb  3.8  /  TBili  0.3  /  DBili  <0.2  /  AST  33  /  ALT  23  /  AlkPhos  155<H>  05-26      LIVER FUNCTIONS - ( 26 May 2024 06:00 )  Alb: 3.8 g/dL / Pro: 7.2 g/dL / ALK PHOS: 155 U/L / ALT: 23 U/L / AST: 33 U/L / GGT: x             Urinalysis Basic - ( 26 May 2024 06:00 )    Color: x / Appearance: x / SG: x / pH: x  Gluc: 87 mg/dL / Ketone: x  / Bili: x / Urobili: x   Blood: x / Protein: x / Nitrite: x   Leuk Esterase: x / RBC: x / WBC x   Sq Epi: x / Non Sq Epi: x / Bacteria: x      Microbiology: reviewed       Radiology: reviewed     Medications:  amLODIPine   Tablet 5 milliGRAM(s) Oral daily  atorvastatin 40 milliGRAM(s) Oral at bedtime  dextrose 10% Bolus 125 milliLiter(s) IV Bolus once  dextrose 5%. 1000 milliLiter(s) IV Continuous <Continuous>  dextrose 5%. 1000 milliLiter(s) IV Continuous <Continuous>  dextrose 50% Injectable 25 Gram(s) IV Push once  dextrose 50% Injectable 12.5 Gram(s) IV Push once  dextrose Oral Gel 15 Gram(s) Oral once PRN  glucagon  Injectable 1 milliGRAM(s) IntraMuscular once  heparin   Injectable 5000 Unit(s) SubCutaneous every 8 hours  insulin lispro (ADMELOG) corrective regimen sliding scale   SubCutaneous three times a day before meals  insulin lispro (ADMELOG) corrective regimen sliding scale   SubCutaneous at bedtime  labetalol 200 milliGRAM(s) Oral two times a day  levothyroxine 50 MICROGram(s) Oral daily  losartan 100 milliGRAM(s) Oral daily  remdesivir  IVPB   IV Intermittent   remdesivir  IVPB 100 milliGRAM(s) IV Intermittent every 24 hours  sodium chloride 0.65% Nasal 1 Spray(s) Both Nostrils every 6 hours    Antimicrobials:  remdesivir  IVPB   IV Intermittent   remdesivir  IVPB 100 milliGRAM(s) IV Intermittent every 24 hours

## 2024-05-27 NOTE — PROGRESS NOTE ADULT - SUBJECTIVE AND OBJECTIVE BOX
No acute SOB  Residual cough  No N/V  No abd pain    Vital Signs Last 24 Hrs  T(C): 36.8 (27 May 2024 05:00), Max: 36.9 (26 May 2024 22:27)  T(F): 98.2 (27 May 2024 05:00), Max: 98.4 (26 May 2024 22:27)  HR: 64 (27 May 2024 05:00) (61 - 65)  BP: 162/78 (27 May 2024 05:00) (145/68 - 162/78)  BP(mean): --  RR: 17 (27 May 2024 05:00) (17 - 18)  SpO2: 95% (27 May 2024 05:00) (95% - 99%)    I&O's Summary      GENERAL: NAD  HEAD: HEENT, EOMI  MOUTH: Partially missing dentition  NECK: Supple, No JVD, No LAD, No carotid bruits, No thyromegaly  CHEST/LUNG: Clear to auscultation bilaterally; Normal respiratory effort w/o intercostal retractions  HEART: Regular rate and rhythm; nl S1/S2; No murmurs, rubs, or gallops  ABDOMEN: Soft, Nontender, Nondistended; Bowel sounds present; No hepatosplenomegaly  EXTREMITIES:  2+ Peripheral Pulses; No clubbing, cyanosis, or edema b/l; Nl ROM  SKIN: No rashes or lesions; No jaundice; Normal turgor, texture  NEURO: A+O x 3; nonfocal CN/motor/sensory/reflexes; speech + comprehension are intact  PSYCH: Flat affect; no delirium or agitation; no suicidal/homicidal ideation      LABS:                        13.8   4.94  )-----------( 180      ( 26 May 2024 06:00 )             42.0     05-27    x   |  x   |  x   ----------------------------<  x   x    |  x   |  0.75    Ca    9.2      26 May 2024 06:00  Phos  4.0     05-25  Mg     1.80     05-25    TPro  6.9  /  Alb  3.7  /  TBili  0.4  /  DBili  <0.2  /  AST  30  /  ALT  24  /  AlkPhos  143<H>  05-27    PT/INR - ( 27 May 2024 06:35 )   PT: 12.4 sec;   INR: 1.11 ratio           CAPILLARY BLOOD GLUCOSE      POCT Blood Glucose.: 94 mg/dL (27 May 2024 08:39)  POCT Blood Glucose.: 99 mg/dL (26 May 2024 22:54)  POCT Blood Glucose.: 93 mg/dL (26 May 2024 17:11)  POCT Blood Glucose.: 108 mg/dL (26 May 2024 12:25)  POCT Blood Glucose.: 105 mg/dL (26 May 2024 08:51)        Urinalysis Basic - ( 26 May 2024 06:00 )    Color: x / Appearance: x / SG: x / pH: x  Gluc: 87 mg/dL / Ketone: x  / Bili: x / Urobili: x   Blood: x / Protein: x / Nitrite: x   Leuk Esterase: x / RBC: x / WBC x   Sq Epi: x / Non Sq Epi: x / Bacteria: x        RADIOLOGY & ADDITIONAL TESTS:    Imaging Personally Reviewed:  [x] YES  [ ] NO    Case discussed with NPP:  [X] YES  [ ] NO

## 2024-05-27 NOTE — PROGRESS NOTE ADULT - SUBJECTIVE AND OBJECTIVE BOX
[facility] [place] [facility]   MD NORY KRISHNAMURTHY MD RUORU WONG, PA CHEN QIAN NP    TEL:  OFFICE: [phone]      RENAL FOLLOW UP NOTE-- Date of Service ;; 05-27-24 @ 13:11  --------------------------------------------------------------------------------  HPI:  Pt seen and examined at bedside  Denies SOB, chest pain.         PAST HISTORY  --------------------------------------------------------------------------------  No significant changes to PMH, PSH, FHx, SHx, unless otherwise noted    ALLERGIES & MEDICATIONS  --------------------------------------------------------------------------------  Allergies    penicillin (Rash)  amoxicillin (Fever)    Intolerances      Standing Inpatient Medications  amLODIPine   Tablet 5 milliGRAM(s) Oral daily  atorvastatin 40 milliGRAM(s) Oral at bedtime  dextrose 10% Bolus 125 milliLiter(s) IV Bolus once  dextrose 5%. 1000 milliLiter(s) IV Continuous <Continuous>  dextrose 5%. 1000 milliLiter(s) IV Continuous <Continuous>  dextrose 50% Injectable 12.5 Gram(s) IV Push once  dextrose 50% Injectable 25 Gram(s) IV Push once  glucagon  Injectable 1 milliGRAM(s) IntraMuscular once  heparin   Injectable 5000 Unit(s) SubCutaneous every 8 hours  insulin lispro (ADMELOG) corrective regimen sliding scale   SubCutaneous at bedtime  insulin lispro (ADMELOG) corrective regimen sliding scale   SubCutaneous three times a day before meals  labetalol 200 milliGRAM(s) Oral two times a day  levothyroxine 50 MICROGram(s) Oral daily  losartan 100 milliGRAM(s) Oral daily  remdesivir  IVPB   IV Intermittent   remdesivir  IVPB 100 milliGRAM(s) IV Intermittent every 24 hours  sodium chloride 0.65% Nasal 1 Spray(s) Both Nostrils every 6 hours    PRN Inpatient Medications  dextrose Oral Gel 15 Gram(s) Oral once PRN      REVIEW OF SYSTEMS  --------------------------------------------------------------------------------  General: no fever  CVS: no chest pain  RESP: no sob, no cough  ABD: no abdominal pain  : no dysuria,  MSK: no edema     VITALS/PHYSICAL EXAM  --------------------------------------------------------------------------------  T(C): 36.4 (05-27-24 @ 10:05), Max: 36.9 (05-26-24 @ 22:27)  HR: 62 (05-27-24 @ 10:05) (61 - 65)  BP: 142/75 (05-27-24 @ 10:05) (142/75 - 162/78)  RR: 18 (05-27-24 @ 10:05) (17 - 18)  SpO2: 97% (05-27-24 @ 10:05) (95% - 99%)  Wt(kg): --  Height (cm): 190.5 (05-26-24 @ 12:00)  Weight (kg): 98 (05-26-24 @ 12:00)  BMI (kg/m2): 27 (05-26-24 @ 12:00)  BSA (m2): 2.27 (05-26-24 @ 12:00)      Physical Exam:  	Gen: NAD  	HEENT: MMM  	Pulm: CTA B/L  	CV: S1S2  	Abd: Soft, +BS  	Ext: No LE edema B/L                      Neuro: Awake , alert  	Skin: Warm and Dry   	Vascular access:             LABS/STUDIES  --------------------------------------------------------------------------------              13.8   4.94  >-----------<  180      [05-26-24 @ 06:00]              42.0     x   |  x   |  x   ----------------------------<  x       [05-27-24 @ 06:35]  x    |  x   |  0.75        Ca     9.2     [05-26-24 @ 06:00]    TPro  6.9  /  Alb  3.7  /  TBili  0.4  /  DBili  <0.2  /  AST  30  /  ALT  24  /  AlkPhos  143  [05-27-24 @ 06:35]    PT/INR: PT 12.4 , INR 1.11       [05-27-24 @ 06:35]      Creatinine Trend:  SCr 0.75 [05-27 @ 06:35]  SCr 0.76 [05-26 @ 06:00]  SCr 0.90 [05-25 @ 15:20]  SCr 0.77 [05-25 @ 09:07]  SCr 0.71 [05-25 @ 00:57]    Urinalysis - [05-26-24 @ 06:00]      Color  / Appearance  / SG  / pH       Gluc 87 / Ketone   / Bili  / Urobili        Blood  / Protein  / Leuk Est  / Nitrite       RBC  / WBC  / Hyaline  / Gran  / Sq Epi  / Non Sq Epi  / Bacteria     Urine Creatinine 49      [05-25-24 @ 15:15]  Urine Protein 75      [05-25-24 @ 15:15]  Urine Sodium 46      [05-25-24 @ 12:04]  Urine Osmolality 197      [05-25-24 @ 12:04]    Iron 35, TIBC 237, %sat 15      [07-16-23 @ 03:00]  Ferritin 249      [07-16-23 @ 03:00]  TSH 2.25      [05-25-24 @ 00:57]  Lipid: chol 187, , HDL 37, LDL --      [03-09-24 @ 09:43]       [facility] [place] [facility]   MD NORY KRISHNAMURTHY MD RUORU WONG, PA CHEN QIAN NP    TEL:  OFFICE: [phone]      RENAL FOLLOW UP NOTE-- Date of Service ;; 05-27-24 @ 13:11  --------------------------------------------------------------------------------  HPI:  Pt seen and examined at bedside  Denies SOB, chest pain.         PAST HISTORY  --------------------------------------------------------------------------------  No significant changes to PMH, PSH, FHx, SHx, unless otherwise noted    ALLERGIES & MEDICATIONS  --------------------------------------------------------------------------------  Allergies    penicillin (Rash)  amoxicillin (Fever)    Intolerances      Standing Inpatient Medications  amLODIPine   Tablet 5 milliGRAM(s) Oral daily  atorvastatin 40 milliGRAM(s) Oral at bedtime  dextrose 10% Bolus 125 milliLiter(s) IV Bolus once  dextrose 5%. 1000 milliLiter(s) IV Continuous <Continuous>  dextrose 5%. 1000 milliLiter(s) IV Continuous <Continuous>  dextrose 50% Injectable 12.5 Gram(s) IV Push once  dextrose 50% Injectable 25 Gram(s) IV Push once  glucagon  Injectable 1 milliGRAM(s) IntraMuscular once  heparin   Injectable 5000 Unit(s) SubCutaneous every 8 hours  insulin lispro (ADMELOG) corrective regimen sliding scale   SubCutaneous at bedtime  insulin lispro (ADMELOG) corrective regimen sliding scale   SubCutaneous three times a day before meals  labetalol 200 milliGRAM(s) Oral two times a day  levothyroxine 50 MICROGram(s) Oral daily  losartan 100 milliGRAM(s) Oral daily  remdesivir  IVPB   IV Intermittent   remdesivir  IVPB 100 milliGRAM(s) IV Intermittent every 24 hours  sodium chloride 0.65% Nasal 1 Spray(s) Both Nostrils every 6 hours    PRN Inpatient Medications  dextrose Oral Gel 15 Gram(s) Oral once PRN      REVIEW OF SYSTEMS  --------------------------------------------------------------------------------  General: no fever  CVS: no chest pain  RESP: no sob, no cough  ABD: no abdominal pain  : no dysuria,  MSK: no edema     VITALS/PHYSICAL EXAM  --------------------------------------------------------------------------------  T(C): 36.4 (05-27-24 @ 10:05), Max: 36.9 (05-26-24 @ 22:27)  HR: 62 (05-27-24 @ 10:05) (61 - 65)  BP: 142/75 (05-27-24 @ 10:05) (142/75 - 162/78)  RR: 18 (05-27-24 @ 10:05) (17 - 18)  SpO2: 97% (05-27-24 @ 10:05) (95% - 99%)  Wt(kg): --  Height (cm): 190.5 (05-26-24 @ 12:00)  Weight (kg): 98 (05-26-24 @ 12:00)  BMI (kg/m2): 27 (05-26-24 @ 12:00)  BSA (m2): 2.27 (05-26-24 @ 12:00)      Physical Exam:  	Gen: NAD  	HEENT: MMM  	Pulm: CTA B/L  	CV: S1S2  	Abd: Soft, +BS  	Ext: No LE edema B/L                      Neuro: Awake , alert  	Skin: Warm and Dry   	             LABS/STUDIES  --------------------------------------------------------------------------------              13.8   4.94  >-----------<  180      [05-26-24 @ 06:00]              42.0     x   |  x   |  x   ----------------------------<  x       [05-27-24 @ 06:35]  x    |  x   |  0.75        Ca     9.2     [05-26-24 @ 06:00]    TPro  6.9  /  Alb  3.7  /  TBili  0.4  /  DBili  <0.2  /  AST  30  /  ALT  24  /  AlkPhos  143  [05-27-24 @ 06:35]    PT/INR: PT 12.4 , INR 1.11       [05-27-24 @ 06:35]      Creatinine Trend:  SCr 0.75 [05-27 @ 06:35]  SCr 0.76 [05-26 @ 06:00]  SCr 0.90 [05-25 @ 15:20]  SCr 0.77 [05-25 @ 09:07]  SCr 0.71 [05-25 @ 00:57]    Urinalysis - [05-26-24 @ 06:00]      Color  / Appearance  / SG  / pH       Gluc 87 / Ketone   / Bili  / Urobili        Blood  / Protein  / Leuk Est  / Nitrite       RBC  / WBC  / Hyaline  / Gran  / Sq Epi  / Non Sq Epi  / Bacteria     Urine Creatinine 49      [05-25-24 @ 15:15]  Urine Protein 75      [05-25-24 @ 15:15]  Urine Sodium 46      [05-25-24 @ 12:04]  Urine Osmolality 197      [05-25-24 @ 12:04]    Iron 35, TIBC 237, %sat 15      [07-16-23 @ 03:00]  Ferritin 249      [07-16-23 @ 03:00]  TSH 2.25      [05-25-24 @ 00:57]  Lipid: chol 187, , HDL 37, LDL --      [03-09-24 @ 09:43]

## 2024-05-27 NOTE — PROGRESS NOTE ADULT - ASSESSMENT
58-yo Male with HTN, hypothyroidism, polydypsia, T2DM (on metformin), CVID with hypogammaglobulinema, schizoaffective disorder who presented w/ productive cough (green sputum) x 3 days    COVID  symptomatic  at risk for progression to severe disease  procal negative  CXR- clear    hyponatremia  nephrology following    Recommendations  c/w remdesivir x 3 day course w/ last day today  monitor liver enzymes while on remdesivir  trend inflammatory markers  supportive care- mucinex, flonase  isolation per infection control policy     Milton Christine M.D.  [facility], Division of Infectious Diseases  [phone]  After 5pm on weekdays and all day on weekends - please call [phone]

## 2024-05-27 NOTE — PROGRESS NOTE ADULT - ASSESSMENT
58-yo Male with HTN, HLD, hypothyroidism, polydipsia, T2DM (on metformin), CVID with hypogammaglobulinema, schizoaffective disorder with multiple psych hospitalizations who presents with chief complaint of productive cough (green sputum) x 3 days.  Nephrology consulted for hyponatremia.    A/P:  Hyponatremia:  Na low but was improving   Repeat urine studies suggests polydipsia.  Pt reports he drinks 3-4L of tea/day.  Fluid restriction to 1.5L advised - pt educated  Continue salt tab  Monitor Na..  No sodium level today  Avoid over correction >8mEq/24hrs.    HTN:  BP acceptable  Currently on labetalol 200mg BID and losartan 100mg qd.  On Amlodipine 5mg qd.  Smoking cessation advised.  Low salt diet.  Monitor BP.    Proteinuria:  UPr/Cr 1.5gm  Likely in setting of HTN.  Can be worked up outpatient.

## 2024-05-28 ENCOUNTER — TRANSCRIPTION ENCOUNTER (OUTPATIENT)
Age: 59
End: 2024-05-28

## 2024-05-28 VITALS
DIASTOLIC BLOOD PRESSURE: 76 MMHG | SYSTOLIC BLOOD PRESSURE: 141 MMHG | TEMPERATURE: 98 F | RESPIRATION RATE: 17 BRPM | HEART RATE: 64 BPM | OXYGEN SATURATION: 96 %

## 2024-05-28 LAB
ANION GAP SERPL CALC-SCNC: 11 MMOL/L — SIGNIFICANT CHANGE UP (ref 7–14)
BUN SERPL-MCNC: 14 MG/DL — SIGNIFICANT CHANGE UP (ref 7–23)
CALCIUM SERPL-MCNC: 9.2 MG/DL — SIGNIFICANT CHANGE UP (ref 8.4–10.5)
CHLORIDE SERPL-SCNC: 95 MMOL/L — LOW (ref 98–107)
CO2 SERPL-SCNC: 25 MMOL/L — SIGNIFICANT CHANGE UP (ref 22–31)
CREAT SERPL-MCNC: 0.73 MG/DL — SIGNIFICANT CHANGE UP (ref 0.5–1.3)
EGFR: 107 ML/MIN/1.73M2 — SIGNIFICANT CHANGE UP
GLUCOSE SERPL-MCNC: 119 MG/DL — HIGH (ref 70–99)
MAGNESIUM SERPL-MCNC: 2.1 MG/DL — SIGNIFICANT CHANGE UP (ref 1.6–2.6)
PHOSPHATE SERPL-MCNC: 3.4 MG/DL — SIGNIFICANT CHANGE UP (ref 2.5–4.5)
POTASSIUM SERPL-MCNC: 4.2 MMOL/L — SIGNIFICANT CHANGE UP (ref 3.5–5.3)
POTASSIUM SERPL-SCNC: 4.2 MMOL/L — SIGNIFICANT CHANGE UP (ref 3.5–5.3)
SODIUM SERPL-SCNC: 131 MMOL/L — LOW (ref 135–145)

## 2024-05-28 RX ORDER — ARIPIPRAZOLE 15 MG/1
400 TABLET ORAL
Qty: 0 | Refills: 0 | DISCHARGE

## 2024-05-28 RX ORDER — AMLODIPINE BESYLATE 2.5 MG/1
1 TABLET ORAL
Qty: 30 | Refills: 0
Start: 2024-05-28 | End: 2024-06-26

## 2024-05-28 RX ADMIN — Medication 50 MICROGRAM(S): at 05:27

## 2024-05-28 RX ADMIN — HEPARIN SODIUM 5000 UNIT(S): 5000 INJECTION INTRAVENOUS; SUBCUTANEOUS at 13:01

## 2024-05-28 RX ADMIN — AMLODIPINE BESYLATE 5 MILLIGRAM(S): 2.5 TABLET ORAL at 05:26

## 2024-05-28 RX ADMIN — HEPARIN SODIUM 5000 UNIT(S): 5000 INJECTION INTRAVENOUS; SUBCUTANEOUS at 05:26

## 2024-05-28 RX ADMIN — Medication 200 MILLIGRAM(S): at 05:27

## 2024-05-28 RX ADMIN — LOSARTAN POTASSIUM 100 MILLIGRAM(S): 100 TABLET, FILM COATED ORAL at 05:26

## 2024-05-28 NOTE — DISCHARGE NOTE PROVIDER - CARE PROVIDER_API CALL
Ketan Mcclendon  Internal Medicine  [address] [place] [address], [facility] [address]  [place] [address]  Phone: [phone]  Fax: [phone]  Follow Up Time:

## 2024-05-28 NOTE — PROGRESS NOTE ADULT - ASSESSMENT
58-yo Male with HTN, HLD, hypothyroidism, polydipsia, T2DM (on metformin), CVID with hypogammaglobulinema, schizoaffective disorder with multiple psych hospitalizations who presents with chief complaint of productive cough (green sputum) x 3 days.  Nephrology consulted for hyponatremia.    A/P:  Hyponatremia:  Na low but was improving   Repeat urine studies suggests polydipsia.  Pt reports he drinks 3-4L of tea/day.  Fluid restriction to 1.5L advised - pt educated  Continue salt tab  Monitor Na  Avoid over correction >8mEq/24hrs.    HTN:  BP fluctuating  Currently on labetalol 200mg BID and losartan 100mg qd.  On Amlodipine 5mg qd.  Smoking cessation advised.  Low salt diet.  Monitor BP.    Proteinuria:  UPr/Cr 1.5gm  Likely in setting of HTN.  Can be worked up outpatient.

## 2024-05-28 NOTE — DISCHARGE NOTE PROVIDER - NSDCFUSCHEDAPPT_GEN_ALL_CORE_FT
Boxer, Mitchell B  [facility]  [facility] [address]  Scheduled Appointment: 07/15/2024

## 2024-05-28 NOTE — DISCHARGE NOTE PROVIDER - NSDCCPCAREPLAN_GEN_ALL_CORE_FT
PRINCIPAL DISCHARGE DIAGNOSIS  Diagnosis: 2019 novel coronavirus disease (COVID-19)  Assessment and Plan of Treatment: You have been diagnosed with the COVID-19 virus during your hospital stay. You must self quarantine to complete a 5 day time period.  Monitor for fevers, shortness of breath and cough primarily.  Monitor your temperature daily to not any changes and increases.    It has been determined that you no longer need hospitalization and can recover while remaining in self-quarantine at home. You should follow the prevention steps below until a healthcare provider or local or state health department says you can return to your normal activities.  1. You should restrict activities outside your home, except for getting medical care.  2. Do not go to work, school, or public areas.  3. Avoid using public transportation, ride-sharing, or taxis.  4. Separate yourself from other people and animals in your home.  5. Call ahead before visiting your doctor.  6. Wear a facemask.  7. Cover your coughs and sneezes.  8. Clean your hands often.  9. Avoid sharing personal household items.  10. Clean all “high-touch” surfaces everyday.  11. Monitor your symptoms.  If you have a medical emergency and need to call 911, notify the dispatch personnel that you have COVID-19 If possible, put on a facemask before emergency medical services arrive.  12. Stopping home isolation.  Patients with confirmed COVID-19 should remain under home isolation precautions for 14 days since the positive COVID-19 test and until the risk of secondary transmission to others is thought to be low. The decision to discontinue home isolation precautions should be made on a case-by-case basis, in consultation with healthcare providers and state and local health departments. Your [facility] Department of Health can be reached at [phone] for further information about COVID-19.        SECONDARY DISCHARGE DIAGNOSES  Diagnosis: Hyponatremia  Assessment and Plan of Treatment: Your sodium level was found to be low. Continue with 100ml fluid restriction and follow up with PCP in 1-2 weeks to recheck sodium levels.    Diagnosis: Hypertension  Assessment and Plan of Treatment: You were started on amlodipine for blood pressure. Low sodium and fat diet, continue anti-hypertensive medications, and follow up with primary care physician.      Diagnosis: Schizoaffective disorder  Assessment and Plan of Treatment: Continue with medications and follow up with psychiatry.    Diagnosis: Hypothyroid  Assessment and Plan of Treatment: Continue with medications and follow up with PCP for montioring of thyroid levels.

## 2024-05-28 NOTE — DISCHARGE NOTE PROVIDER - HOSPITAL COURSE
58-yo Male with HTN, HLD, hypothyroidism, polydypsia, T2DM (on metformin), CVID with hypogammaglobulinema, schizoaffective disorder with multiple psych hospitalizations who is admitted with hyponatremia and (+)COVID-19.    Hyponatremia  - has prior hx of polydypsia  - 1L fluid restriction  - s/p hypertonic saline and NaCl  - TSH is normal  - daily BMP  - appreciate nephrology  - Pt to follow up with pcp as outpt for monitoring of sodium levels.    COVID-19 positivity, symptomatic  - saturating well on RA  - at high-risk for progression to severe symptoms  - contact/droplet precautions  - remdesivir 3-day protocol 5/25/24 --> 5/27/24  - no clinical indications for solumedrol  -  ID consulted     DM2 with unspecified complications  - consistent carbohydrate diet  - ISS  - hold metformin while inpt  - Hgb1ac --> 5.7    Schizoaffective disorder  - on q4 weekly abilify 400 mg IM injections  - next dose should be 5/31/24    HTN  - cont labetalol  - cont losartan    HLD  - cont statin    Hypothyroidism, unspecified  - TSH is normal  - cont synthroid    Need for prophylaxis  - heparin 5000 units SC q8h    On 5/28/24, discussed with Dr. Harden, patient is medically cleared and optimized for discharge today. All medications were reviewed with attending, and sent to mutually agreed upon pharmacy.

## 2024-05-28 NOTE — PROGRESS NOTE ADULT - ASSESSMENT
58-yo Male with HTN, hypothyroidism, polydypsia, T2DM (on metformin), CVID with hypogammaglobulinema, schizoaffective disorder who presented w/ productive cough (green sputum) x 3 days    COVID  symptomatic  at risk for progression to severe disease  procal negative  CXR- clear    hyponatremia  nephrology following    Recommendations  s/p remdesivir x 3 days, completed 5/27  trend inflammatory markers  supportive care  management of hyponatremia per nephrology/ primary team    Milton Christine M.D.  [facility], Division of Infectious Diseases  [phone]  After 5pm on weekdays and all day on weekends - please call [phone]

## 2024-05-28 NOTE — PROGRESS NOTE ADULT - PROVIDER SPECIALTY LIST ADULT
Infectious Disease
Infectious Disease
Internal Medicine
Nephrology
Nephrology
Infectious Disease
Internal Medicine
Nephrology

## 2024-05-28 NOTE — DISCHARGE NOTE PROVIDER - NSDCMRMEDTOKEN_GEN_ALL_CORE_FT
Abilify Maintena 400 mg intramuscular injection, extended release: 400 milligram(s) intramuscularly every 4 weeks next dose due 4/5  atorvastatin 40 mg oral tablet: 1 tab(s) orally once a day (at bedtime)  labetalol 200 mg oral tablet: 1 tab(s) orally 2 times a day  levothyroxine 50 mcg (0.05 mg) oral tablet: 1 tab(s) orally once a day  losartan 100 mg oral tablet: 1 tab(s) orally once a day  metFORMIN 500 mg oral tablet: 1 tab(s) orally 2 times a day   Abilify Maintena 400 mg intramuscular injection, extended release: 400 milligram(s) intramuscularly every 4 weeks next dose due 4/5  amLODIPine 5 mg oral tablet: 1 tab(s) orally once a day  atorvastatin 40 mg oral tablet: 1 tab(s) orally once a day (at bedtime)  labetalol 200 mg oral tablet: 1 tab(s) orally 2 times a day  levothyroxine 50 mcg (0.05 mg) oral tablet: 1 tab(s) orally once a day  losartan 100 mg oral tablet: 1 tab(s) orally once a day  metFORMIN 500 mg oral tablet: 1 tab(s) orally 2 times a day   Abilify Maintena 400 mg intramuscular injection, extended release: 400 milligram(s) intramuscularly every 4 weeks next dose due 5/31  amLODIPine 5 mg oral tablet: 1 tab(s) orally once a day  atorvastatin 40 mg oral tablet: 1 tab(s) orally once a day (at bedtime)  labetalol 200 mg oral tablet: 1 tab(s) orally 2 times a day  levothyroxine 50 mcg (0.05 mg) oral tablet: 1 tab(s) orally once a day  losartan 100 mg oral tablet: 1 tab(s) orally once a day  metFORMIN 500 mg oral tablet: 1 tab(s) orally 2 times a day

## 2024-05-28 NOTE — PHYSICAL THERAPY INITIAL EVALUATION ADULT - NSPTDISCHREC_GEN_A_CORE
Pt not placed on skilled therapy services secondary to Pt performing at their baseline functional mobility performance./No skilled PT needs

## 2024-05-28 NOTE — PHYSICAL THERAPY INITIAL EVALUATION ADULT - ADDITIONAL COMMENTS
Pt stated he lives alone in an apartment with 2-3 stairs to enter; Pt resides on the first floor. prior to admission Pt was independent with all mobility and ambulated without an assistive device.     Pt. left comfortable in bed with all tubes/lines intact, head of the bed elevated, call bell in reach and in NAD. RN aware of session and pt current position.

## 2024-05-28 NOTE — PROGRESS NOTE ADULT - NS ATTEND AMEND GEN_ALL_CORE FT
Na improving-monitor
continue fluid restriction-monitor Na level
Na remains low-continue salt tab and fluid restriction

## 2024-05-28 NOTE — PROGRESS NOTE ADULT - SUBJECTIVE AND OBJECTIVE BOX
[facility] [place] [facility]   MD NORY KRISHNAMURTHY MD MARIA SANTIAGO, NP    TEL:  OFFICE: [phone]  From 5pm-7am Answering Service [phone]    -- RENAL FOLLOW UP NOTE ---Date of Service 05-28-24 @ 14:30    Patient is a 58y old  Male who presents with a chief complaint of cough, URI    Patient seen and examined at bedside. No chest pain/sob    VITALS:  T(F): 97.9 (05-28-24 @ 13:30), Max: 98 (05-28-24 @ 05:00)  HR: 64 (05-28-24 @ 13:30)  BP: 141/76 (05-28-24 @ 13:30)  RR: 17 (05-28-24 @ 13:30)  SpO2: 96% (05-28-24 @ 13:30)  Wt(kg): --        PHYSICAL EXAM:  General: NAD  Neck: No JVD  Respiratory: CTAB, no wheezes, rales or rhonchi  Cardiovascular: S1, S2, RRR  Gastrointestinal: BS+, soft, NT/ND  Extremities: No peripheral edema    Hospital Medications:   MEDICATIONS  (STANDING):  amLODIPine   Tablet 5 milliGRAM(s) Oral daily  atorvastatin 40 milliGRAM(s) Oral at bedtime  dextrose 10% Bolus 125 milliLiter(s) IV Bolus once  dextrose 5%. 1000 milliLiter(s) (100 mL/Hr) IV Continuous <Continuous>  dextrose 5%. 1000 milliLiter(s) (50 mL/Hr) IV Continuous <Continuous>  dextrose 50% Injectable 25 Gram(s) IV Push once  dextrose 50% Injectable 12.5 Gram(s) IV Push once  glucagon  Injectable 1 milliGRAM(s) IntraMuscular once  heparin   Injectable 5000 Unit(s) SubCutaneous every 8 hours  insulin lispro (ADMELOG) corrective regimen sliding scale   SubCutaneous three times a day before meals  insulin lispro (ADMELOG) corrective regimen sliding scale   SubCutaneous at bedtime  labetalol 200 milliGRAM(s) Oral two times a day  levothyroxine 50 MICROGram(s) Oral daily  losartan 100 milliGRAM(s) Oral daily  sodium chloride 0.65% Nasal 1 Spray(s) Both Nostrils every 6 hours      LABS:  05-28    131<L>  |  95<L>  |  14  ----------------------------<  119<H>  4.2   |  25  |  0.73    Ca    9.2      28 May 2024 07:45  Phos  3.4     05-28  Mg     2.10     05-28    TPro  6.9  /  Alb  3.7  /  TBili  0.4  /  DBili  <0.2  /  AST  30  /  ALT  24  /  AlkPhos  143<H>  05-27    Creatinine Trend: 0.73 <--, 0.75 <--, 0.76 <--, 0.90 <--, 0.77 <--, 0.71 <--    Phosphorus: 3.4 mg/dL (05-28 @ 07:45)          Urine Studies:  Urinalysis - [05-28-24 @ 07:45]      Color  / Appearance  / SG  / pH       Gluc 119 / Ketone   / Bili  / Urobili        Blood  / Protein  / Leuk Est  / Nitrite       RBC  / WBC  / Hyaline  / Gran  / Sq Epi  / Non Sq Epi  / Bacteria     Urine Creatinine 49      [05-25-24 @ 15:15]  Urine Protein 75      [05-25-24 @ 15:15]  Urine Sodium 46      [05-25-24 @ 12:04]  Urine Osmolality 197      [05-25-24 @ 12:04]    Iron 35, TIBC 237, %sat 15      [07-16-23 @ 03:00]  Ferritin 249      [07-16-23 @ 03:00]  TSH 2.25      [05-25-24 @ 00:57]  Lipid: chol 187, , HDL 37, LDL --      [03-09-24 @ 09:43]        RADIOLOGY & ADDITIONAL STUDIES:

## 2024-05-28 NOTE — DISCHARGE NOTE NURSING/CASE MANAGEMENT/SOCIAL WORK - PATIENT PORTAL LINK FT
You can access the FollowMyHealth Patient Portal offered by [facility] by registering at the following website: [email or web address]. By joining [employer]’s FollowMyHealth portal, you will also be able to view your health information using other applications (apps) compatible with our system.

## 2024-05-28 NOTE — DISCHARGE NOTE PROVIDER - NSDCFUADDAPPT_GEN_ALL_CORE_FT
Follow up with your primary care physician for further monitoring in 1-2 weeks. Please call to arrange appointment.

## 2024-05-28 NOTE — PROGRESS NOTE ADULT - SUBJECTIVE AND OBJECTIVE BOX
[facility], Division of Infectious Diseases  MEGHANA Sosa Y. Patel, S. Shah, G. Emmett  [phone]  ([phone] - weekdays after 5pm and weekends)    Name: DAT WOOD  Age/Gender: 58y Male  MRN: [id number]    Interval History:  Notes reviewed.   No concerning overnight events.  Afebrile.   reports nasal congestion improved    Allergies: penicillin (Rash)  amoxicillin (Fever)      Objective:  Vitals:   T(F): 98 (05-28-24 @ 05:00), Max: 98 (05-28-24 @ 05:00)  HR: 76 (05-28-24 @ 05:00) (56 - 76)  BP: 167/83 (05-28-24 @ 05:00) (150/74 - 167/83)  RR: 18 (05-28-24 @ 05:00) (18 - 18)  SpO2: 95% (05-28-24 @ 05:00) (95% - 96%)  Physical Examination:  General: no acute distress  HEENT: anicteric  Cardio: normal rate  Resp: breathing comfortably on RA   Abd: soft, NT, ND  Ext: no LE edema  Skin: warm, dry    Laboratory Studies:  CBC:   WBC Trend:  4.94 05-26-24 @ 06:00  5.23 05-25-24 @ 00:57    CMP: 05-28    131<L>  |  95<L>  |  14  ----------------------------<  119<H>  4.2   |  25  |  0.73    Ca    9.2      28 May 2024 07:45  Phos  3.4     05-28  Mg     2.10     05-28    TPro  6.9  /  Alb  3.7  /  TBili  0.4  /  DBili  <0.2  /  AST  30  /  ALT  24  /  AlkPhos  143<H>  05-27      LIVER FUNCTIONS - ( 27 May 2024 06:35 )  Alb: 3.7 g/dL / Pro: 6.9 g/dL / ALK PHOS: 143 U/L / ALT: 24 U/L / AST: 30 U/L / GGT: x             Urinalysis Basic - ( 28 May 2024 07:45 )    Color: x / Appearance: x / SG: x / pH: x  Gluc: 119 mg/dL / Ketone: x  / Bili: x / Urobili: x   Blood: x / Protein: x / Nitrite: x   Leuk Esterase: x / RBC: x / WBC x   Sq Epi: x / Non Sq Epi: x / Bacteria: x      Microbiology: reviewed       Radiology: reviewed     Medications:  amLODIPine   Tablet 5 milliGRAM(s) Oral daily  atorvastatin 40 milliGRAM(s) Oral at bedtime  dextrose 10% Bolus 125 milliLiter(s) IV Bolus once  dextrose 5%. 1000 milliLiter(s) IV Continuous <Continuous>  dextrose 5%. 1000 milliLiter(s) IV Continuous <Continuous>  dextrose 50% Injectable 12.5 Gram(s) IV Push once  dextrose 50% Injectable 25 Gram(s) IV Push once  dextrose Oral Gel 15 Gram(s) Oral once PRN  glucagon  Injectable 1 milliGRAM(s) IntraMuscular once  heparin   Injectable 5000 Unit(s) SubCutaneous every 8 hours  insulin lispro (ADMELOG) corrective regimen sliding scale   SubCutaneous at bedtime  insulin lispro (ADMELOG) corrective regimen sliding scale   SubCutaneous three times a day before meals  labetalol 200 milliGRAM(s) Oral two times a day  levothyroxine 50 MICROGram(s) Oral daily  losartan 100 milliGRAM(s) Oral daily  sodium chloride 0.65% Nasal 1 Spray(s) Both Nostrils every 6 hours    Antimicrobials:

## 2024-06-07 PROCEDURE — 90833 PSYTX W PT W E/M 30 MIN: CPT

## 2024-06-07 PROCEDURE — 99214 OFFICE O/P EST MOD 30 MIN: CPT

## 2024-06-19 NOTE — ED ADULT NURSE NOTE - NS_BH TRG Q4B_ED_A_ED
Continue increased intake of clear liquids and rest.  Continue to monitor urine output.  Baca diet.  Return stool samples if symptoms persist 2 more days.  Follow up pending lab results.  Follow up if symptoms persist or worsen.   Past 24 hrs

## 2024-06-20 ENCOUNTER — APPOINTMENT (OUTPATIENT)
Dept: RHEUMATOLOGY | Facility: CLINIC | Age: 59
End: 2024-06-20
Payer: MEDICARE

## 2024-06-20 VITALS
HEART RATE: 66 BPM | SYSTOLIC BLOOD PRESSURE: 137 MMHG | TEMPERATURE: 97.8 F | OXYGEN SATURATION: 96 % | DIASTOLIC BLOOD PRESSURE: 79 MMHG | RESPIRATION RATE: 16 BRPM

## 2024-06-20 VITALS
HEART RATE: 60 BPM | DIASTOLIC BLOOD PRESSURE: 80 MMHG | OXYGEN SATURATION: 97 % | SYSTOLIC BLOOD PRESSURE: 142 MMHG | RESPIRATION RATE: 16 BRPM

## 2024-06-20 PROCEDURE — 96365 THER/PROPH/DIAG IV INF INIT: CPT

## 2024-06-20 PROCEDURE — 96366 THER/PROPH/DIAG IV INF ADDON: CPT

## 2024-06-20 RX ORDER — DIPHENHYDRAMINE HCL 25 MG/1
25 TABLET ORAL
Qty: 0 | Refills: 0 | Status: COMPLETED
Start: 2024-05-23

## 2024-06-20 RX ORDER — IMMUNE GLOBULIN INTRAVENOUS (HUMAN) 10 G
10 KIT INTRAVENOUS
Qty: 0 | Refills: 0 | Status: COMPLETED | OUTPATIENT
Start: 2024-01-05

## 2024-06-20 RX ORDER — ACETAMINOPHEN 325 MG/1
325 TABLET ORAL
Qty: 0 | Refills: 0 | Status: COMPLETED
Start: 2024-05-23

## 2024-06-20 NOTE — HISTORY OF PRESENT ILLNESS
[N/A] : N/A [Denies] : Denies [No] : No [Yes] : Yes [Informed consent documented in EHR.] : Informed consent documented in EHR. [24g] : 24g [Start Time: ___] : Medication Start Time: [unfilled] [End Time: ___] : Medication End Time: [unfilled] [Medication Name: ___] : Medication Name: [unfilled] [Total Amount Administered: ___] : Total Amount Administered: [unfilled] [IV discontinued. Intact. No signs or symptoms of IV complications noted. Time: ___] : IV discontinued. Intact. No signs or symptoms of IV complications noted. Time: [unfilled] [Patient  instructed to seek medical attention with signs and symptoms of adverse effects] : Patient  instructed to seek medical attention with signs and symptoms of adverse effects [Patient left unit in no acute distress] : Patient left unit in no acute distress [Medications administered as ordered and tolerated well.] : Medications administered as ordered and tolerated well. [de-identified] : left leg wound [de-identified] : Right hand dorsal vein  [de-identified] : no labs [de-identified] : Patient presents for scheduled Gammagard S/D infusion, ambulatory in [facility]. Today, patient reports overall to be doing well and denies recent infections or ABX use.  Patient continues to reports having wound to his left leg, open to air, dry/scabbed.  No other symptoms or concerns verbalized at this time. Patient pre-medicated, infusion titrated as per protocol and tolerated well. Patient left unit ambulatory in [facility].

## 2024-06-21 NOTE — ED ADULT NURSE NOTE - NS ED NOTE ABUSE RESPONSE YN
Patient returned call. Listened to VM. Would like to know next steps.   Relayed lab results and recommendations.   Awaiting culture to know which type of bacteria was found to appropriately prescribe abx  Will await results. Advised to push fluids to help flush out bacteria.     Rosalina MURON, RN     no

## 2024-06-22 ENCOUNTER — INPATIENT (INPATIENT)
Facility: HOSPITAL | Age: 59
LOS: 19 days | Discharge: HOME | End: 2024-07-12
Attending: STUDENT IN AN ORGANIZED HEALTH CARE EDUCATION/TRAINING PROGRAM | Admitting: STUDENT IN AN ORGANIZED HEALTH CARE EDUCATION/TRAINING PROGRAM
Payer: MEDICARE

## 2024-06-22 VITALS
OXYGEN SATURATION: 95 % | HEIGHT: 75 IN | WEIGHT: 259.93 LBS | SYSTOLIC BLOOD PRESSURE: 162 MMHG | TEMPERATURE: 99 F | RESPIRATION RATE: 18 BRPM | HEART RATE: 79 BPM | DIASTOLIC BLOOD PRESSURE: 76 MMHG

## 2024-06-22 DIAGNOSIS — K08.199 COMPLETE LOSS OF TEETH DUE TO OTHER SPECIFIED CAUSE, UNSPECIFIED CLASS: Chronic | ICD-10-CM

## 2024-06-22 DIAGNOSIS — F20.9 SCHIZOPHRENIA, UNSPECIFIED: ICD-10-CM

## 2024-06-22 DIAGNOSIS — F25.9 SCHIZOAFFECTIVE DISORDER, UNSPECIFIED: ICD-10-CM

## 2024-06-22 DIAGNOSIS — J34.2 DEVIATED NASAL SEPTUM: Chronic | ICD-10-CM

## 2024-06-22 DIAGNOSIS — F17.200 NICOTINE DEPENDENCE, UNSPECIFIED, UNCOMPLICATED: ICD-10-CM

## 2024-06-22 LAB
ADD ON TEST-[facility] IN LAB: SIGNIFICANT CHANGE UP
ALBUMIN SERPL ELPH-MCNC: 3.7 G/DL — SIGNIFICANT CHANGE UP (ref 3.3–5)
ALBUMIN SERPL ELPH-MCNC: 4 G/DL — SIGNIFICANT CHANGE UP (ref 3.3–5)
ALP SERPL-CCNC: 126 U/L — HIGH (ref 40–120)
ALP SERPL-CCNC: 139 U/L — HIGH (ref 40–120)
ALT FLD-CCNC: 19 U/L — SIGNIFICANT CHANGE UP (ref 4–41)
ALT FLD-CCNC: 19 U/L — SIGNIFICANT CHANGE UP (ref 4–41)
AMPHET UR-MCNC: NEGATIVE — SIGNIFICANT CHANGE UP
ANION GAP SERPL CALC-SCNC: 13 MMOL/L — SIGNIFICANT CHANGE UP (ref 7–14)
ANION GAP SERPL CALC-SCNC: 13 MMOL/L — SIGNIFICANT CHANGE UP (ref 7–14)
APAP SERPL-MCNC: <10 UG/ML — LOW (ref 15–25)
APPEARANCE UR: CLEAR — SIGNIFICANT CHANGE UP
AST SERPL-CCNC: 23 U/L — SIGNIFICANT CHANGE UP (ref 4–40)
AST SERPL-CCNC: 23 U/L — SIGNIFICANT CHANGE UP (ref 4–40)
BARBITURATES UR SCN-MCNC: NEGATIVE — SIGNIFICANT CHANGE UP
BASOPHILS # BLD AUTO: 0.05 K/UL — SIGNIFICANT CHANGE UP (ref 0–0.2)
BASOPHILS NFR BLD AUTO: 0.9 % — SIGNIFICANT CHANGE UP (ref 0–2)
BENZODIAZ UR-MCNC: NEGATIVE — SIGNIFICANT CHANGE UP
BILIRUB SERPL-MCNC: 0.3 MG/DL — SIGNIFICANT CHANGE UP (ref 0.2–1.2)
BILIRUB SERPL-MCNC: 0.4 MG/DL — SIGNIFICANT CHANGE UP (ref 0.2–1.2)
BILIRUB UR-MCNC: NEGATIVE — SIGNIFICANT CHANGE UP
BUN SERPL-MCNC: 11 MG/DL — SIGNIFICANT CHANGE UP (ref 7–23)
BUN SERPL-MCNC: 9 MG/DL — SIGNIFICANT CHANGE UP (ref 7–23)
CALCIUM SERPL-MCNC: 9.6 MG/DL — SIGNIFICANT CHANGE UP (ref 8.4–10.5)
CALCIUM SERPL-MCNC: 9.8 MG/DL — SIGNIFICANT CHANGE UP (ref 8.4–10.5)
CHLORIDE SERPL-SCNC: 95 MMOL/L — LOW (ref 98–107)
CHLORIDE SERPL-SCNC: 96 MMOL/L — LOW (ref 98–107)
CO2 SERPL-SCNC: 22 MMOL/L — SIGNIFICANT CHANGE UP (ref 22–31)
CO2 SERPL-SCNC: 26 MMOL/L — SIGNIFICANT CHANGE UP (ref 22–31)
COCAINE METAB.OTHER UR-MCNC: NEGATIVE — SIGNIFICANT CHANGE UP
COLOR SPEC: YELLOW — SIGNIFICANT CHANGE UP
CREAT SERPL-MCNC: 0.84 MG/DL — SIGNIFICANT CHANGE UP (ref 0.5–1.3)
CREAT SERPL-MCNC: 0.87 MG/DL — SIGNIFICANT CHANGE UP (ref 0.5–1.3)
CREATININE URINE RESULT, DAU: 26 MG/DL — SIGNIFICANT CHANGE UP
DIFF PNL FLD: NEGATIVE — SIGNIFICANT CHANGE UP
EGFR: 101 ML/MIN/1.73M2 — SIGNIFICANT CHANGE UP
EGFR: 102 ML/MIN/1.73M2 — SIGNIFICANT CHANGE UP
EOSINOPHIL # BLD AUTO: 0.25 K/UL — SIGNIFICANT CHANGE UP (ref 0–0.5)
EOSINOPHIL NFR BLD AUTO: 4.3 % — SIGNIFICANT CHANGE UP (ref 0–6)
ETHANOL SERPL-MCNC: <10 MG/DL — SIGNIFICANT CHANGE UP
FENTANYL UR QL SCN: NEGATIVE — SIGNIFICANT CHANGE UP
FLUAV AG NPH QL: SIGNIFICANT CHANGE UP
FLUBV AG NPH QL: SIGNIFICANT CHANGE UP
GLUCOSE BLDC GLUCOMTR-MCNC: 103 MG/DL — HIGH (ref 70–99)
GLUCOSE BLDC GLUCOMTR-MCNC: 109 MG/DL — HIGH (ref 70–99)
GLUCOSE BLDC GLUCOMTR-MCNC: 115 MG/DL — HIGH (ref 70–99)
GLUCOSE SERPL-MCNC: 100 MG/DL — HIGH (ref 70–99)
GLUCOSE SERPL-MCNC: 103 MG/DL — HIGH (ref 70–99)
GLUCOSE UR QL: NEGATIVE MG/DL — SIGNIFICANT CHANGE UP
HCT VFR BLD CALC: 39.7 % — SIGNIFICANT CHANGE UP (ref 39–50)
HGB BLD-MCNC: 13.2 G/DL — SIGNIFICANT CHANGE UP (ref 13–17)
IANC: 3.63 K/UL — SIGNIFICANT CHANGE UP (ref 1.8–7.4)
IMM GRANULOCYTES NFR BLD AUTO: 0.2 % — SIGNIFICANT CHANGE UP (ref 0–0.9)
KETONES UR-MCNC: NEGATIVE MG/DL — SIGNIFICANT CHANGE UP
LEUKOCYTE ESTERASE UR-ACNC: NEGATIVE — SIGNIFICANT CHANGE UP
LYMPHOCYTES # BLD AUTO: 0.91 K/UL — LOW (ref 1–3.3)
LYMPHOCYTES # BLD AUTO: 15.8 % — SIGNIFICANT CHANGE UP (ref 13–44)
MCHC RBC-ENTMCNC: 27.8 PG — SIGNIFICANT CHANGE UP (ref 27–34)
MCHC RBC-ENTMCNC: 33.2 GM/DL — SIGNIFICANT CHANGE UP (ref 32–36)
MCV RBC AUTO: 83.8 FL — SIGNIFICANT CHANGE UP (ref 80–100)
METHADONE UR-MCNC: NEGATIVE — SIGNIFICANT CHANGE UP
MONOCYTES # BLD AUTO: 0.9 K/UL — SIGNIFICANT CHANGE UP (ref 0–0.9)
MONOCYTES NFR BLD AUTO: 15.7 % — HIGH (ref 2–14)
NEUTROPHILS # BLD AUTO: 3.63 K/UL — SIGNIFICANT CHANGE UP (ref 1.8–7.4)
NEUTROPHILS NFR BLD AUTO: 63.1 % — SIGNIFICANT CHANGE UP (ref 43–77)
NITRITE UR-MCNC: NEGATIVE — SIGNIFICANT CHANGE UP
NRBC # BLD: 0 /100 WBCS — SIGNIFICANT CHANGE UP (ref 0–0)
NRBC # FLD: 0 K/UL — SIGNIFICANT CHANGE UP (ref 0–0)
OPIATES UR-MCNC: NEGATIVE — SIGNIFICANT CHANGE UP
OXYCODONE UR-MCNC: NEGATIVE — SIGNIFICANT CHANGE UP
PCP SPEC-MCNC: SIGNIFICANT CHANGE UP
PCP UR-MCNC: NEGATIVE — SIGNIFICANT CHANGE UP
PH UR: 7.5 — SIGNIFICANT CHANGE UP (ref 5–8)
PLATELET # BLD AUTO: 191 K/UL — SIGNIFICANT CHANGE UP (ref 150–400)
POTASSIUM SERPL-MCNC: 4.4 MMOL/L — SIGNIFICANT CHANGE UP (ref 3.5–5.3)
POTASSIUM SERPL-MCNC: 4.5 MMOL/L — SIGNIFICANT CHANGE UP (ref 3.5–5.3)
POTASSIUM SERPL-SCNC: 4.4 MMOL/L — SIGNIFICANT CHANGE UP (ref 3.5–5.3)
POTASSIUM SERPL-SCNC: 4.5 MMOL/L — SIGNIFICANT CHANGE UP (ref 3.5–5.3)
PROT SERPL-MCNC: 7 G/DL — SIGNIFICANT CHANGE UP (ref 6–8.3)
PROT SERPL-MCNC: 7.3 G/DL — SIGNIFICANT CHANGE UP (ref 6–8.3)
PROT UR-MCNC: SIGNIFICANT CHANGE UP MG/DL
RBC # BLD: 4.74 M/UL — SIGNIFICANT CHANGE UP (ref 4.2–5.8)
RBC # FLD: 14.2 % — SIGNIFICANT CHANGE UP (ref 10.3–14.5)
RSV RNA NPH QL NAA+NON-PROBE: SIGNIFICANT CHANGE UP
SALICYLATES SERPL-MCNC: <0.3 MG/DL — LOW (ref 15–30)
SARS-COV-2 RNA SPEC QL NAA+PROBE: SIGNIFICANT CHANGE UP
SODIUM SERPL-SCNC: 130 MMOL/L — LOW (ref 135–145)
SODIUM SERPL-SCNC: 135 MMOL/L — SIGNIFICANT CHANGE UP (ref 135–145)
SP GR SPEC: 1.01 — SIGNIFICANT CHANGE UP (ref 1–1.03)
THC UR QL: NEGATIVE — SIGNIFICANT CHANGE UP
TOXICOLOGY SCREEN, DRUGS OF ABUSE, SERUM RESULT: SIGNIFICANT CHANGE UP
UROBILINOGEN FLD QL: 0.2 MG/DL — SIGNIFICANT CHANGE UP (ref 0.2–1)
WBC # BLD: 5.75 K/UL — SIGNIFICANT CHANGE UP (ref 3.8–10.5)
WBC # FLD AUTO: 5.75 K/UL — SIGNIFICANT CHANGE UP (ref 3.8–10.5)

## 2024-06-22 PROCEDURE — 99285 EMERGENCY DEPT VISIT HI MDM: CPT

## 2024-06-22 RX ORDER — LEVOTHYROXINE SODIUM 25 MCG
50 TABLET ORAL DAILY
Refills: 0 | Status: DISCONTINUED | OUTPATIENT
Start: 2024-06-22 | End: 2024-07-12

## 2024-06-22 RX ORDER — HALOPERIDOL DECANOATE 100 MG/ML
5 VIAL (ML) INTRAMUSCULAR EVERY 6 HOURS
Refills: 0 | Status: DISCONTINUED | OUTPATIENT
Start: 2024-06-22 | End: 2024-07-12

## 2024-06-22 RX ORDER — GLUCAGON HYDROCHLORIDE 1 MG/ML
1 INJECTION, POWDER, FOR SOLUTION INTRAMUSCULAR; INTRAVENOUS; SUBCUTANEOUS ONCE
Refills: 0 | Status: DISCONTINUED | OUTPATIENT
Start: 2024-06-22 | End: 2024-07-12

## 2024-06-22 RX ORDER — ATORVASTATIN CALCIUM 20 MG/1
40 TABLET, FILM COATED ORAL AT BEDTIME
Refills: 0 | Status: DISCONTINUED | OUTPATIENT
Start: 2024-06-22 | End: 2024-07-12

## 2024-06-22 RX ORDER — INSULIN LISPRO 100 [IU]/ML
INJECTION, SOLUTION SUBCUTANEOUS
Refills: 0 | Status: DISCONTINUED | OUTPATIENT
Start: 2024-06-22 | End: 2024-07-12

## 2024-06-22 RX ORDER — NICOTINE POLACRILEX 2 MG/1
1 LOZENGE ORAL DAILY
Refills: 0 | Status: DISCONTINUED | OUTPATIENT
Start: 2024-06-22 | End: 2024-07-12

## 2024-06-22 RX ORDER — LABETALOL HYDROCHLORIDE 300 MG/1
200 TABLET ORAL
Refills: 0 | Status: DISCONTINUED | OUTPATIENT
Start: 2024-06-22 | End: 2024-07-12

## 2024-06-22 RX ORDER — LORAZEPAM 0.5 MG
2 TABLET ORAL EVERY 6 HOURS
Refills: 0 | Status: DISCONTINUED | OUTPATIENT
Start: 2024-06-22 | End: 2024-06-29

## 2024-06-22 RX ORDER — DEXTROSE 30 % IN WATER 30 %
15 VIAL (ML) INTRAVENOUS ONCE
Refills: 0 | Status: DISCONTINUED | OUTPATIENT
Start: 2024-06-22 | End: 2024-07-12

## 2024-06-22 RX ORDER — AMLODIPINE BESYLATE 2.5 MG/1
5 TABLET ORAL DAILY
Refills: 0 | Status: DISCONTINUED | OUTPATIENT
Start: 2024-06-22 | End: 2024-07-12

## 2024-06-22 RX ORDER — HALOPERIDOL DECANOATE 100 MG/ML
5 VIAL (ML) INTRAMUSCULAR ONCE
Refills: 0 | Status: DISCONTINUED | OUTPATIENT
Start: 2024-06-22 | End: 2024-07-12

## 2024-06-22 RX ORDER — HALOPERIDOL DECANOATE 100 MG/ML
7 VIAL (ML) INTRAMUSCULAR AT BEDTIME
Refills: 0 | Status: DISCONTINUED | OUTPATIENT
Start: 2024-06-22 | End: 2024-06-23

## 2024-06-22 RX ORDER — LOSARTAN POTASSIUM 100 MG/1
100 TABLET, FILM COATED ORAL DAILY
Refills: 0 | Status: DISCONTINUED | OUTPATIENT
Start: 2024-06-22 | End: 2024-07-12

## 2024-06-22 RX ORDER — HALOPERIDOL DECANOATE 100 MG/ML
5 VIAL (ML) INTRAMUSCULAR DAILY
Refills: 0 | Status: DISCONTINUED | OUTPATIENT
Start: 2024-06-22 | End: 2024-07-02

## 2024-06-22 RX ORDER — LORAZEPAM 0.5 MG
2 TABLET ORAL ONCE
Refills: 0 | Status: DISCONTINUED | OUTPATIENT
Start: 2024-06-22 | End: 2024-06-29

## 2024-06-22 RX ORDER — METFORMIN HYDROCHLORIDE 850 MG/1
500 TABLET, FILM COATED ORAL
Refills: 0 | Status: DISCONTINUED | OUTPATIENT
Start: 2024-06-22 | End: 2024-07-12

## 2024-06-22 RX ADMIN — Medication 7 MILLIGRAM(S): at 20:31

## 2024-06-22 RX ADMIN — METFORMIN HYDROCHLORIDE 500 MILLIGRAM(S): 850 TABLET, FILM COATED ORAL at 20:30

## 2024-06-22 RX ADMIN — ATORVASTATIN CALCIUM 40 MILLIGRAM(S): 20 TABLET, FILM COATED ORAL at 20:30

## 2024-06-22 RX ADMIN — LABETALOL HYDROCHLORIDE 200 MILLIGRAM(S): 300 TABLET ORAL at 20:34
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
## 2024-06-22 NOTE — ED PROVIDER NOTE - PROGRESS NOTE DETAILS
MD Mann: Psych has evaluated the pt, recommending psychiatric admission. Labs here normal, sodium is 130 (seems to be around his baseline). Psych requesting repeat BMP in the morning to make sure sodium is not down-trending prior to accepting. Jose Luis, Attending  Patient signed out to me pending admitting psychiatry doctor for inpatient psych hospitalization. 57 y/o male with pmhx of bipolar and schizophrenia presenting for suicidal ideation with plan. Patient was pending rpt CMP for sodium of 130. Rpt sodium 135 noted at signout and psych aware and accepting for inpatient admission; medically cleared by signout provider; pending admitting psych doctor. Patient calm and cooperative at this time. Hemodynamically stable.

## 2024-06-22 NOTE — ED ADULT NURSE NOTE - OBJECTIVE STATEMENT
Pt received to  from home. Pt presents calm and cooperative from home with a hx of schizophrenia. Pt states he has been experiencing non CAH x 6 months in spite of compliance with monthly Abilify injection and daily Haldol. Pt describes voices as "two people talking and one is harassing the other". Pt denies SI presently but says over the past 6 months "he thought about it" but denies plan or intent. Pt also denies drug or alcohol use. Pts belongings secured for safety. Pt awaiting psychiatric evaluation.

## 2024-06-22 NOTE — ED BEHAVIORAL HEALTH ASSESSMENT NOTE - SUMMARY
Patient is a 58 y o male, domiciled in supportive housing [place], on SSD, single, unemployed, w/ PMHx HTN, DM2 (last HBa1c from 5/2024 was 5.7), hypogammaglobulinemia (for which he receives monthly infusions; reports last was yesterday), w/ PPHx schizoaffective d/o, hx of multiple inpatient psychiatric hospitalizations (last known in 2024 at [facility] due to hyponatremia on medical clearance labs), follows outpatient at [facility] w/ Dr Cook (last visit on 6/19/24; on Abilify BELLA, reports next due 7/1/24),  denies hx SA/NSSIB (however per chart review one collateral note cites sister reporting remote SA), denies drug or alcohol use, w/ exception of tobacco, denies legal hx, self presented to the ED for suicidal ideations.     On evaluation, patient presents with auditory hallucinations, paranoid persecutory delusions, with suicidal ideation and plan if discharged back home, hopelessness/guilt/worthlessness and is s/p aborted suicide attempt in the community prior to arrival to the ED.  At this time, patient is unable to contract for safety in the community and is requesting and meets criteria for voluntary inpatient psychiatric admission for safety, stabilization and medication optimization (agreeable to further Haldol increase). Patient reports they would be able to maintain safety from suicidal standpoint while on inpatient unit and if this changed they would tell nursing staff and denies active HI at this time, thus no 1:1 indicated at this time.     Recommendations:   -admit on 9.13 legal status, pending medical clearance   -titrate Haldol to 5mg PO qAM and 7.5mg PO qhs (recently increased to 5mg BID on 6/19 as per outpatient note)  -confirm patient's last Abilify BELLA in face due 7/1 and that last IVIG in fact was yesterday  -HTN: continue amLODIPine 5 mg oral tablet: 1 tab(s) orally once a day, labetalol 200 mg oral tablet: 1 tab(s) orally 2 times a day, losartan 100 mg oral tablet: 1 tab(s) orally once a day  -HLD: continue atorvastatin 40 mg oral tablet: 1 tab(s) orally once a day (at bedtime)  -Hypothyroid: continue levothyroxine 50 mcg (0.05 mg) oral tablet: 1 tab(s) orally once a day  -Tobacco Use Disorder: NRT  -DM: continue metFORMIN 500 mg oral tablet: 1 tab(s) orally 2 times a day and insulin sliding scale  -For agitation please attempt verbal de-escalation and behavioral intervention first. If patient does not respond to above, may give haldol 5 mg po q6h prn, ativan 2 mg po q6h prn if no contraindications. If patient is refusing PO, remains an imminent danger to self or others and If Patient's  qtc <500, can escalate to IM formulation. If IM antipsychotic is administered, please perform follow-up ECG for QTc monitoring. Patient is a 58 y o male, domiciled in supportive housing [place], on SSD, single, unemployed, w/ PMHx HTN, DM2 (last HBa1c from 5/2024 was 5.7), hypogammaglobulinemia (for which he receives monthly infusions; reports last was yesterday), w/ PPHx schizoaffective d/o, hx of multiple inpatient psychiatric hospitalizations (last known in 2024 at [facility] due to hyponatremia on medical clearance labs), follows outpatient at [facility] w/ Dr Cook (last visit on 6/19/24; on Abilify BELLA, reports next due 7/1/24),  denies hx SA/NSSIB (however per chart review one collateral note cites sister reporting remote SA), denies drug or alcohol use, w/ exception of tobacco, denies legal hx, self presented to the ED for suicidal ideations.     On evaluation, patient presents with auditory hallucinations, paranoid persecutory delusions, with suicidal ideation and plan if discharged back home, hopelessness/guilt/worthlessness and is s/p aborted suicide attempt in the community prior to arrival to the ED.  At this time, patient is unable to contract for safety in the community and is requesting and meets criteria for voluntary inpatient psychiatric admission for safety, stabilization and medication optimization (agreeable to further Haldol increase). Patient reports they would be able to maintain safety from suicidal standpoint while on inpatient unit and if this changed they would tell nursing staff and denies active HI at this time, thus no 1:1 indicated at this time.     Recommendations:   -admit on 9.13 legal status, pending medical clearance   -titrate Haldol to 5mg PO qAM and 7mg PO qhs (increased to 5mg BID on 6/19 as per outpatient note)  -confirm patient's last Abilify BELLA in face due 7/1 and that last IVIG in fact was yesterday  -HTN: continue amLODIPine 5 mg oral tablet: 1 tab(s) orally once a day, labetalol 200 mg oral tablet: 1 tab(s) orally 2 times a day, losartan 100 mg oral tablet: 1 tab(s) orally once a day  -HLD: continue atorvastatin 40 mg oral tablet: 1 tab(s) orally once a day (at bedtime)  -Hypothyroid: continue levothyroxine 50 mcg (0.05 mg) oral tablet: 1 tab(s) orally once a day  -Tobacco Use Disorder: NRT  -DM: continue metFORMIN 500 mg oral tablet: 1 tab(s) orally 2 times a day and insulin sliding scale  -For agitation please attempt verbal de-escalation and behavioral intervention first. If patient does not respond to above, may give haldol 5 mg po q6h prn, ativan 2 mg po q6h prn if no contraindications. If patient is refusing PO, remains an imminent danger to self or others and If Patient's  qtc <500, can escalate to IM formulation. If IM antipsychotic is administered, please perform follow-up ECG for QTc monitoring. Patient is a 58 y o male, domiciled in supportive housing [place], on SSD, single, unemployed, w/ PMHx HTN, DM2 (last HBa1c from 5/2024 was 5.7), hypogammaglobulinemia (for which he receives monthly infusions; reports last was yesterday), w/ PPHx schizoaffective d/o, hx of multiple inpatient psychiatric hospitalizations (last known in 2024 at [facility] due to hyponatremia on medical clearance labs), follows outpatient at [facility] w/ Dr Cook (last visit on 6/19/24; on Abioz BELLA, reports next due 7/1/24),  denies hx SA/NSSIB (however per chart review one collateral note cites sister reporting remote SA), denies drug or alcohol use, w/ exception of tobacco, denies legal hx, self presented to the ED for suicidal ideations.     On evaluation, patient presents with auditory hallucinations, paranoid persecutory delusions, with suicidal ideation and plan if discharged back home, hopelessness/guilt/worthlessness and is s/p aborted suicide attempt in the community prior to arrival to the ED.  At this time, patient is unable to contract for safety in the community and is requesting and meets criteria for voluntary inpatient psychiatric admission for safety, stabilization and medication optimization (agreeable to further Haldol increase). Patient reports they would be able to maintain safety from suicidal standpoint while on inpatient unit and if this changed they would tell nursing staff and denies active HI at this time, thus no 1:1 indicated at this time.     Patient initially noted to be with Na of 130, which as per primary team appears to be his baseline.  As per primary team, no nephrology consult indicated nor salt tabs and recommended fluid restriction of 1.5L/ day once on inpatient unit. Repeat was requested to ensure not downtrending and follow up Na was 135; as per primary team, patient is medically cleared and no further intervention indicated at this time.       Recommendations:   -admit on 9.13 legal status, pending medical clearance   -hx of hyponatremia on initial lab draw / polydipsia as per chart review: patient's repeat Na WNL and as per primary team nothing to do other than fluid restriction of 1.5L/day on unit; f/u BMP ordered for 6.23.24  -titrate Haldol to 5mg PO qAM and 7mg PO qhs (increased to 5mg BID on 6/19 as per outpatient note)  -confirm patient's last Abilify BELLA in face due 7/1 and that last IVIG in fact was yesterday  -HTN: continue amLODIPine 5 mg oral tablet: 1 tab(s) orally once a day, labetalol 200 mg oral tablet: 1 tab(s) orally 2 times a day, losartan 100 mg oral tablet: 1 tab(s) orally once a day  -HLD: continue atorvastatin 40 mg oral tablet: 1 tab(s) orally once a day (at bedtime)  -Hypothyroid: continue levothyroxine 50 mcg (0.05 mg) oral tablet: 1 tab(s) orally once a day  -Tobacco Use Disorder: NRT  -DM: continue metFORMIN 500 mg oral tablet: 1 tab(s) orally 2 times a day and insulin sliding scale  -For agitation please attempt verbal de-escalation and behavioral intervention first. If patient does not respond to above, may give haldol 5 mg po q6h prn, ativan 2 mg po q6h prn if no contraindications. If patient is refusing PO, remains an imminent danger to self or others and If Patient's  qtc <500, can escalate to IM formulation. If IM antipsychotic is administered, please perform follow-up ECG for QTc monitoring.

## 2024-06-22 NOTE — BH PATIENT PROFILE - HOME MEDICATIONS
amLODIPine 5 mg oral tablet , 1 tab(s) orally once a day  Abilify Maintena 400 mg intramuscular injection, extended release , 400 milligram(s) intramuscularly every 4 weeks next dose due 5/31  levothyroxine 50 mcg (0.05 mg) oral tablet , 1 tab(s) orally once a day  labetalol 200 mg oral tablet , 1 tab(s) orally 2 times a day  atorvastatin 40 mg oral tablet , 1 tab(s) orally once a day (at bedtime)  metFORMIN 500 mg oral tablet , 1 tab(s) orally 2 times a day  losartan 100 mg oral tablet , 1 tab(s) orally once a day

## 2024-06-22 NOTE — ED ADULT NURSE NOTE - CHIEF COMPLAINT QUOTE
Pt with history of Bipolar and schizophrenia comes in stating that he feels suicidal. Denies any suicidal plans. Pt offers no other complaints. He says that he is taking his medications regularly. Has no suicidal attempt in the past.  calm and cooperative in triage. Has history of Type 2 DM

## 2024-06-22 NOTE — ED BEHAVIORAL HEALTH ASSESSMENT NOTE - NSBHATTESTBILLING_PSY_A_CORE
HPI:  71M with PMHx of HTN, transferred from OSH after fall with trauma to right forearm, open laceration, s/p  vascular procedure unknown at OSH. Now s/p wound I&D 7/13.  Dermatology consulted for actinic keratoses on the scalp. He has 2 outpatient dermatologists (Dr. Cheung in [place] and Dr. Salas ) who he sees regularly for skin checks and treatment of AKs. Says he had a precancerous mole removed from his scalp but denies history of skin cancer. No lesions itching or bleeding on scalp today. Missed his most recent derm apt due to hospitalization.   Also requested higher strength shampoo for his seborrheic dermatitis, currently using OTC nizoral  Denies rashes elsewhere. No other acute complaints    PAST MEDICAL & SURGICAL HISTORY:  HTN (hypertension)  Knee pain, left  Squamous cell carcinoma  scalp  Parotid mass  left - benign  H/O squamous cell carcinoma  removed form the scalp in 2016          REVIEW OF SYSTEMS  General:	  Skin/Breast	  Ophthalmologic:	  ENMT:	  Respiratory and Thorax:	  Cardiovascular:	  Gastrointestinal:	  Genitourinary:	  Musculoskeletal:	  Neurological:  Psychiatric:	  Hematology/Lymphatics:	  Endocrine:	  Allergic/Immunologic:	    MEDICATIONS  (STANDING):  amLODIPine   Tablet 10 milliGRAM(s) Oral daily  ampicillin/sulbactam  IVPB 1.5 Gram(s) IV Intermittent every 6 hours  enoxaparin Injectable 40 milliGRAM(s) SubCutaneous every 24 hours  polyethylene glycol 3350 17 Gram(s) Oral two times a day  senna 2 Tablet(s) Oral at bedtime    MEDICATIONS  (PRN):  acetaminophen     Tablet .. 650 milliGRAM(s) Oral every 6 hours PRN Mild Pain (1 - 3)  oxyCODONE    IR 5 milliGRAM(s) Oral every 6 hours PRN Moderate Pain (4 - 6)  oxyCODONE    IR 10 milliGRAM(s) Oral every 6 hours PRN Severe Pain (7 - 10)      Allergies  No Known Allergies  Intolerances    SOCIAL HISTORY:  . Retired . Lives alone in [facility] in [place] but has private home with  in .    FAMILY HISTORY:  Family history of diabetes mellitus (Mother)      Vital Signs Last 24 Hrs  T(C): 36.5 (22 Jul 2022 14:07), Max: 37.3 (21 Jul 2022 22:00)  T(F): 97.7 (22 Jul 2022 14:07), Max: 99.1 (21 Jul 2022 22:00)  HR: 75 (22 Jul 2022 14:07) (74 - 82)  BP: 134/82 (22 Jul 2022 14:07) (130/78 - 156/84)  BP(mean): --  RR: 18 (22 Jul 2022 14:07) (18 - 18)  SpO2: 96% (22 Jul 2022 14:07) (93% - 99%)    Parameters below as of 22 Jul 2022 14:07  Patient On (Oxygen Delivery Method): room air        PHYSICAL EXAM:  The patient was alert and oriented X 3, well nourished, and in no apparent distress.  There was no visible lymphadenopathy. Conjunctiva were non-injected. Right arm wrapped in dressing.  Focused exam of scalp and face per patient request, denies skin concerns elsewhere  notable for: gritty brown and pink papules on the scalp    LABS:                        9.6    10.11 )-----------( 434      ( 22 Jul 2022 07:22 )             29.8     07-22    140  |  103  |  22  ----------------------------<  106<H>  3.7   |  29  |  1.11    Ca    8.7      22 Jul 2022 07:22  Phos  3.2     07-22  Mg     2.1     07-22    TPro  6.4  /  Alb  3.5  /  TBili  0.3  /  DBili  x   /  AST  21  /  ALT  36  /  AlkPhos  98  07-21      RADIOLOGY & ADDITIONAL STUDIES:   HPI:  71M with PMHx of HTN, transferred from OSH after fall with trauma to right forearm, open laceration, s/p  vascular procedure unknown at OSH. Now s/p wound I&D 7/13.  Dermatology consulted for actinic keratoses on the scalp. He has 2 outpatient dermatologists (Dr. Cheung in [place] and Dr. Salas ) who he sees regularly for skin checks and treatment of AKs. Says he had a precancerous mole removed from his scalp but denies history of skin cancer. No lesions itching or bleeding on scalp today. Missed his most recent derm apt due to hospitalization.   Also requested higher strength shampoo for his seborrheic dermatitis, currently using OTC nizoral  Denies rashes elsewhere. No other acute complaints    PAST MEDICAL & SURGICAL HISTORY:  HTN (hypertension)  Knee pain, left  Squamous cell carcinoma  scalp  Parotid mass  left - benign  H/O squamous cell carcinoma  removed form the scalp in 2016    REVIEW OF SYSTEMS  per HPI    MEDICATIONS  (STANDING):  amLODIPine   Tablet 10 milliGRAM(s) Oral daily  ampicillin/sulbactam  IVPB 1.5 Gram(s) IV Intermittent every 6 hours  enoxaparin Injectable 40 milliGRAM(s) SubCutaneous every 24 hours  polyethylene glycol 3350 17 Gram(s) Oral two times a day  senna 2 Tablet(s) Oral at bedtime    MEDICATIONS  (PRN):  acetaminophen     Tablet .. 650 milliGRAM(s) Oral every 6 hours PRN Mild Pain (1 - 3)  oxyCODONE    IR 5 milliGRAM(s) Oral every 6 hours PRN Moderate Pain (4 - 6)  oxyCODONE    IR 10 milliGRAM(s) Oral every 6 hours PRN Severe Pain (7 - 10)      Allergies  No Known Allergies  Intolerances    SOCIAL HISTORY:  . Retired . Lives alone in [facility] [place] but has private home with  in .    FAMILY HISTORY:  Family history of diabetes mellitus (Mother)      Vital Signs Last 24 Hrs  T(C): 36.5 (22 Jul 2022 14:07), Max: 37.3 (21 Jul 2022 22:00)  T(F): 97.7 (22 Jul 2022 14:07), Max: 99.1 (21 Jul 2022 22:00)  HR: 75 (22 Jul 2022 14:07) (74 - 82)  BP: 134/82 (22 Jul 2022 14:07) (130/78 - 156/84)  BP(mean): --  RR: 18 (22 Jul 2022 14:07) (18 - 18)  SpO2: 96% (22 Jul 2022 14:07) (93% - 99%)    Parameters below as of 22 Jul 2022 14:07  Patient On (Oxygen Delivery Method): room air        PHYSICAL EXAM:  The patient was alert and oriented X 3, well nourished, and in no apparent distress.  There was no visible lymphadenopathy. Conjunctiva were non-injected. Right arm wrapped in dressing.  Focused exam of scalp and face per patient request, denies skin concerns elsewhere  notable for: gritty brown and pink papules on the scalp    LABS:                        9.6    10.11 )-----------( 434      ( 22 Jul 2022 07:22 )             29.8     07-22    140  |  103  |  22  ----------------------------<  106<H>  3.7   |  29  |  1.11    Ca    8.7      22 Jul 2022 07:22  Phos  3.2     07-22  Mg     2.1     07-22    TPro  6.4  /  Alb  3.5  /  TBili  0.3  /  DBili  x   /  AST  21  /  ALT  36  /  AlkPhos  98  07-21      RADIOLOGY & ADDITIONAL STUDIES:   99285-Emergency department visit - high complexity

## 2024-06-22 NOTE — ED ADULT NURSE REASSESSMENT NOTE - NS ED NURSE REASSESS COMMENT FT1
Pt woke up to use bathroom; reassessment vitals as noted and 6am lab drawn and sent per order. Pt awaiting medical clearance.

## 2024-06-22 NOTE — ED BEHAVIORAL HEALTH ASSESSMENT NOTE - NSTXRELFACTOR_PSY_ALL_CORE
recent increase in Haldol/Change in provider or treatment (i.e., medications, psychotherapy, milieu)
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
## 2024-06-22 NOTE — ED BEHAVIORAL HEALTH ASSESSMENT NOTE - CURRENT MEDICATION
Abilify Maintena 400mg [id number], reports next due on 7/1/24  amLODIPine 5 mg oral tablet: 1 tab(s) orally once a day  atorvastatin 40 mg oral tablet: 1 tab(s) orally once a day (at bedtime)  labetalol 200 mg oral tablet: 1 tab(s) orally 2 times a day  levothyroxine 50 mcg (0.05 mg) oral tablet: 1 tab(s) orally once a day  losartan 100 mg oral tablet: 1 tab(s) orally once a day  metFORMIN 500 mg oral tablet: 1 tab(s) orally 2 times a day  haldol 5mg PO BID (increased to this dose on 6/19/24)  IVIG monthly, gamma-guard SD hypogammaglobulinemia - reports last yesterday

## 2024-06-22 NOTE — ED PROVIDER NOTE - CLINICAL SUMMARY MEDICAL DECISION MAKING FREE TEXT BOX
59 yo M with h/o bipolar and schizophrenia (on haldol and abilify) who presents to the ED with suicidal ideation with a plan to stab himself in the chest. No other complaints/symptoms. Plan for medical clearance labs and psychiatric evaluation.
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
## 2024-06-22 NOTE — ED ADULT NURSE NOTE - SUICIDE SCREENING QUESTION 3
Kel pt has Nurse ED visit scheduled for today at 1030 but needs to reschedule in 2 weeks.  Needs to complete after 8 wks pregnant No

## 2024-06-22 NOTE — ED PROVIDER NOTE - OBJECTIVE STATEMENT
59 yo M with h/o bipolar and schizophrenia (on haldol and abilify) who presents to the ED c/o suicidal ideation. He reports that it started today. He had a plan to stab himself in the neck. Reports suicidal ideation in the past but no history of self harm or suicide attempts. He reports taking his haldol every day, and gets abilify injections once a month and is due next on July 1st. He has an outpatient psychiatrist who he last spoke to 1 week ago. Pt reports chronic auditory hallucinations (cannot hear what the voices are saying currently). No visual hallucinations, HI. No other complaints/symptoms.

## 2024-06-22 NOTE — ED BEHAVIORAL HEALTH ASSESSMENT NOTE - DESCRIPTION
as per HPI During course of ED visit patient was calm and cooperative. Patient was not aggressive or violent and did not require PRN medications.    ICU Vital Signs Last 24 Hrs  T(C): 37.1 (22 Jun 2024 00:20), Max: 37.1 (22 Jun 2024 00:20)  T(F): 98.7 (22 Jun 2024 00:20), Max: 98.7 (22 Jun 2024 00:20)  HR: 79 (22 Jun 2024 00:20) (79 - 79)  BP: 162/76 (22 Jun 2024 00:20) (162/76 - 162/76)  BP(mean): --  ABP: --  ABP(mean): --  RR: 18 (22 Jun 2024 00:20) (18 - 18)  SpO2: 95% (22 Jun 2024 00:20) (95% - 95%)    O2 Parameters below as of 22 Jun 2024 00:20  Patient On (Oxygen Delivery Method): room air lives in supportive housing, on disability, close to sisters Susannah and Brittany ([phone])

## 2024-06-22 NOTE — ED BEHAVIORAL HEALTH ASSESSMENT NOTE - RISK ASSESSMENT
The patient is at chronically elevated risk of self harm and suicide. Risk factors include schizoaffective disorder, hx multiple hospitalizations, hx SI. Protective factors include stable housing, supportive family, engagement in treatment. Overall, the patient is at imminent risk for harm to self and to others due to  +si/+aborted SA prior to arrival to hospital.

## 2024-06-22 NOTE — ED ADULT NURSE REASSESSMENT NOTE - NS ED NURSE REASSESS COMMENT FT1
RN Rounding Note 4:45am- Pt observed sleeping; respirations even and non labored. Pt awaiting repeat 6am lab work for medical clearance for admission to inpatient psychiatry.
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
## 2024-06-23 LAB
ANION GAP SERPL CALC-SCNC: 11 MMOL/L — SIGNIFICANT CHANGE UP (ref 7–14)
BUN SERPL-MCNC: 14 MG/DL — SIGNIFICANT CHANGE UP (ref 7–23)
CALCIUM SERPL-MCNC: 8.6 MG/DL — SIGNIFICANT CHANGE UP (ref 8.4–10.5)
CHLORIDE SERPL-SCNC: 94 MMOL/L — LOW (ref 98–107)
CO2 SERPL-SCNC: 26 MMOL/L — SIGNIFICANT CHANGE UP (ref 22–31)
CREAT SERPL-MCNC: 0.89 MG/DL — SIGNIFICANT CHANGE UP (ref 0.5–1.3)
EGFR: 101 ML/MIN/1.73M2 — SIGNIFICANT CHANGE UP
GLUCOSE BLDC GLUCOMTR-MCNC: 104 MG/DL — HIGH (ref 70–99)
GLUCOSE BLDC GLUCOMTR-MCNC: 107 MG/DL — HIGH (ref 70–99)
GLUCOSE BLDC GLUCOMTR-MCNC: 94 MG/DL — SIGNIFICANT CHANGE UP (ref 70–99)
GLUCOSE SERPL-MCNC: 236 MG/DL — HIGH (ref 70–99)
POTASSIUM SERPL-MCNC: 4.9 MMOL/L — SIGNIFICANT CHANGE UP (ref 3.5–5.3)
POTASSIUM SERPL-SCNC: 4.9 MMOL/L — SIGNIFICANT CHANGE UP (ref 3.5–5.3)
SODIUM SERPL-SCNC: 131 MMOL/L — LOW (ref 135–145)

## 2024-06-23 PROCEDURE — 99222 1ST HOSP IP/OBS MODERATE 55: CPT | Mod: FS

## 2024-06-23 RX ORDER — HALOPERIDOL DECANOATE 100 MG/ML
7 VIAL (ML) INTRAMUSCULAR AT BEDTIME
Refills: 0 | Status: DISCONTINUED | OUTPATIENT
Start: 2024-06-23 | End: 2024-06-28

## 2024-06-23 RX ADMIN — METFORMIN HYDROCHLORIDE 500 MILLIGRAM(S): 850 TABLET, FILM COATED ORAL at 08:16

## 2024-06-23 RX ADMIN — NICOTINE POLACRILEX 1 PATCH: 2 LOZENGE ORAL at 08:16

## 2024-06-23 RX ADMIN — ATORVASTATIN CALCIUM 40 MILLIGRAM(S): 20 TABLET, FILM COATED ORAL at 20:28

## 2024-06-23 RX ADMIN — Medication 5 MILLIGRAM(S): at 08:16

## 2024-06-23 RX ADMIN — METFORMIN HYDROCHLORIDE 500 MILLIGRAM(S): 850 TABLET, FILM COATED ORAL at 20:28

## 2024-06-23 RX ADMIN — LABETALOL HYDROCHLORIDE 200 MILLIGRAM(S): 300 TABLET ORAL at 20:28

## 2024-06-23 RX ADMIN — Medication 7 MILLIGRAM(S): at 21:23

## 2024-06-23 RX ADMIN — Medication 50 MICROGRAM(S): at 06:13

## 2024-06-23 RX ADMIN — LABETALOL HYDROCHLORIDE 200 MILLIGRAM(S): 300 TABLET ORAL at 08:16

## 2024-06-23 RX ADMIN — LOSARTAN POTASSIUM 100 MILLIGRAM(S): 100 TABLET, FILM COATED ORAL at 08:15

## 2024-06-23 RX ADMIN — AMLODIPINE BESYLATE 5 MILLIGRAM(S): 2.5 TABLET ORAL at 08:16

## 2024-06-23 NOTE — BH INPATIENT PSYCHIATRY ASSESSMENT NOTE - NSBHASSESSSUMMFT_PSY_ALL_CORE
Patient is a 58 y o male, domiciled in supportive housing TSI, on SSD, single, unemployed, w/ PMHx HTN, DM2 (last HBa1c from 5/2024 was 5.7), hypogammaglobulinemia (for which he receives monthly infusions; reports last was yesterday), w/ PPHx schizoaffective d/o, hx of multiple inpatient psychiatric hospitalizations (last known in 2024 at [facility] due to hyponatremia on medical clearance labs), follows outpatient at [facility] w/ Dr Cook (last visit on 6/19/24; on Abioz BELLA, reports next due 7/1/24),  denies hx SA/NSSIB (however per chart review one collateral note cites sister reporting remote SA), denies drug or alcohol use, w/ exception of tobacco, denies legal hx, self presented to the ED for suicidal ideations.   On evaluation, patient presents with auditory hallucinations, paranoid persecutory delusions, with suicidal ideation and plan if discharged back home, hopelessness/guilt/worthlessness and is s/p aborted suicide attempt in the community prior to arrival to the ED.  At this time, patient is unable to contract for safety in the community and is requesting and meets criteria for voluntary inpatient psychiatric admission for safety, stabilization and medication optimization (agreeable to further Haldol increase). Patient reports they would be able to maintain safety from suicidal standpoint while on inpatient unit and if this changed they would tell nursing staff and denies active HI at this time, thus no 1:1 indicated at this time.   Patient initially noted to be with Na of 130, which as per primary team appears to be his baseline.  As per primary team, no nephrology consult indicated nor salt tabs and recommended fluid restriction of 1.5L/ day once on inpatient unit. Repeat was requested to ensure not downtrending and follow up Na was 135; as per primary team, patient is medically cleared and no further intervention indicated at this time.       On the unit, patient reported SA by OD, denies any SI or depressive sx currently. Denies any AH or delusions.    Recommendations:   -admit on 9.13 legal status, pending medical clearance   -hx of hyponatremia on initial lab draw / polydipsia as per chart review: patient's repeat Na WNL and as per primary team nothing to do other than fluid restriction of 1.5L/day on unit; f/u BMP ordered for 6.23.24  -titrate Haldol to 5mg PO qAM and 7mg PO qhs (increased to 5mg BID on 6/19 as per outpatient note)  -confirm patient's last Abilify BELLA in face due 7/1 and that last IVIG in fact was yesterday  -HTN: continue amLODIPine 5 mg oral tablet: 1 tab(s) orally once a day, labetalol 200 mg oral tablet: 1 tab(s) orally 2 times a day, losartan 100 mg oral tablet: 1 tab(s) orally once a day  -HLD: continue atorvastatin 40 mg oral tablet: 1 tab(s) orally once a day (at bedtime)  -Hypothyroid: continue levothyroxine 50 mcg (0.05 mg) oral tablet: 1 tab(s) orally once a day  -Tobacco Use Disorder: NRT  -DM: continue metFORMIN 500 mg oral tablet: 1 tab(s) orally 2 times a day and insulin sliding scale  -For agitation please attempt verbal de-escalation and behavioral intervention first. If patient does not respond to above, may give haldol 5 mg po q6h prn, ativan 2 mg po q6h prn if no contraindications. If patient is refusing PO, remains an imminent danger to self or others and If Patient's  qtc <500, can escalate to IM formulation. If IM antipsychotic is administered, please perform follow-up ECG for QTc monitoring.

## 2024-06-23 NOTE — BH INPATIENT PSYCHIATRY ASSESSMENT NOTE - NSBHATTESTATTENDBILLTIME_PSY_A_CORE
I attest my time as attending is greater than ANA time spent on qualifying patient care activities. I independently performed the documented

## 2024-06-23 NOTE — BH INPATIENT PSYCHIATRY ASSESSMENT NOTE - NSBHCHARTREVIEWVS_PSY_A_CORE FT
Vital Signs Last 24 Hrs  T(C): 36.2 (06-23-24 @ 08:15), Max: 36.8 (06-22-24 @ 19:34)  T(F): 97.1 (06-23-24 @ 08:15), Max: 98.3 (06-22-24 @ 19:34)  HR: --  BP: --  BP(mean): --  RR: 17 (06-23-24 @ 08:15) (17 - 18)  SpO2: --    Orthostatic VS  06-23-24 @ 08:15  Lying BP: --/-- HR: --  Sitting BP: 128/69 HR: 59  Standing BP: 133/70 HR: 69  Site: --  Mode: --  Orthostatic VS  06-22-24 @ 19:34  Lying BP: --/-- HR: --  Sitting BP: 138/70 HR: 78  Standing BP: 118/63 HR: 82  Site: --  Mode: --  Orthostatic VS  06-22-24 @ 09:27  Lying BP: --/-- HR: --  Sitting BP: 118/61 HR: 77  Standing BP: 101/58 HR: 100  Site: --  Mode: --
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
## 2024-06-23 NOTE — BH INPATIENT PSYCHIATRY ASSESSMENT NOTE - NSBHMETABOLIC_PSY_ALL_CORE_FT
BMI: BMI (kg/m2): 32.6 (06-22-24 @ 09:27)  HbA1c: A1C with Estimated Average Glucose Result: 5.7 % (05-26-24 @ 06:00)    Glucose: POCT Blood Glucose.: 107 mg/dL (06-23-24 @ 07:46)    BP: 150/62 (06-22-24 @ 08:33) (140/64 - 162/76)Vital Signs Last 24 Hrs  T(C): 36.2 (06-23-24 @ 08:15), Max: 36.8 (06-22-24 @ 19:34)  T(F): 97.1 (06-23-24 @ 08:15), Max: 98.3 (06-22-24 @ 19:34)  HR: --  BP: --  BP(mean): --  RR: 17 (06-23-24 @ 08:15) (17 - 18)  SpO2: --    Orthostatic VS  06-23-24 @ 08:15  Lying BP: --/-- HR: --  Sitting BP: 128/69 HR: 59  Standing BP: 133/70 HR: 69  Site: --  Mode: --  Orthostatic VS  06-22-24 @ 19:34  Lying BP: --/-- HR: --  Sitting BP: 138/70 HR: 78  Standing BP: 118/63 HR: 82  Site: --  Mode: --  Orthostatic VS  06-22-24 @ 09:27  Lying BP: --/-- HR: --  Sitting BP: 118/61 HR: 77  Standing BP: 101/58 HR: 100  Site: --  Mode: --    Lipid Panel: Date/Time: 03-09-24 @ 09:43  Cholesterol, Serum: 187  LDL Cholesterol Calculated: 123  HDL Cholesterol, Serum: 37  Total Cholesterol/HDL Ration Measurement: --  Triglycerides, Serum: 135

## 2024-06-23 NOTE — BH INPATIENT PSYCHIATRY ASSESSMENT NOTE - CURRENT MEDICATION
MEDICATIONS  (STANDING):  amLODIPine   Tablet 5 milliGRAM(s) Oral daily  atorvastatin 40 milliGRAM(s) Oral at bedtime  glucagon  Injectable 1 milliGRAM(s) IntraMuscular once  haloperidol     Tablet 5 milliGRAM(s) Oral daily  haloperidol     Tablet 7 milliGRAM(s) Oral at bedtime  insulin lispro (ADMELOG) corrective regimen sliding scale   SubCutaneous three times a day before meals  labetalol 200 milliGRAM(s) Oral two times a day  levothyroxine 50 MICROGram(s) Oral daily  losartan 100 milliGRAM(s) Oral daily  metFORMIN 500 milliGRAM(s) Oral two times a day  nicotine - 21 mG/24Hr(s) Patch 1 Patch Transdermal daily    MEDICATIONS  (PRN):  dextrose Oral Gel 15 Gram(s) Oral once PRN Blood Glucose LESS THAN 70 milliGRAM(s)/deciliter  haloperidol     Tablet 5 milliGRAM(s) Oral every 6 hours PRN agitation  haloperidol    Injectable 5 milliGRAM(s) IntraMuscular Once PRN agitation  LORazepam     Tablet 2 milliGRAM(s) Oral every 6 hours PRN Agitation  LORazepam   Injectable 2 milliGRAM(s) IntraMuscular Once PRN Agitation
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
## 2024-06-24 LAB
GLUCOSE BLDC GLUCOMTR-MCNC: 111 MG/DL — HIGH (ref 70–99)
GLUCOSE BLDC GLUCOMTR-MCNC: 112 MG/DL — HIGH (ref 70–99)
GLUCOSE BLDC GLUCOMTR-MCNC: 120 MG/DL — HIGH (ref 70–99)
GLUCOSE BLDC GLUCOMTR-MCNC: 98 MG/DL — SIGNIFICANT CHANGE UP (ref 70–99)

## 2024-06-24 PROCEDURE — 99232 SBSQ HOSP IP/OBS MODERATE 35: CPT

## 2024-06-24 RX ADMIN — NICOTINE POLACRILEX 1 PATCH: 2 LOZENGE ORAL at 08:27

## 2024-06-24 RX ADMIN — LOSARTAN POTASSIUM 100 MILLIGRAM(S): 100 TABLET, FILM COATED ORAL at 08:26

## 2024-06-24 RX ADMIN — METFORMIN HYDROCHLORIDE 500 MILLIGRAM(S): 850 TABLET, FILM COATED ORAL at 08:27

## 2024-06-24 RX ADMIN — AMLODIPINE BESYLATE 5 MILLIGRAM(S): 2.5 TABLET ORAL at 08:26

## 2024-06-24 RX ADMIN — Medication 7 MILLIGRAM(S): at 20:30

## 2024-06-24 RX ADMIN — LABETALOL HYDROCHLORIDE 200 MILLIGRAM(S): 300 TABLET ORAL at 20:30

## 2024-06-24 RX ADMIN — ATORVASTATIN CALCIUM 40 MILLIGRAM(S): 20 TABLET, FILM COATED ORAL at 20:31

## 2024-06-24 RX ADMIN — Medication 5 MILLIGRAM(S): at 08:27

## 2024-06-24 RX ADMIN — LABETALOL HYDROCHLORIDE 200 MILLIGRAM(S): 300 TABLET ORAL at 08:26

## 2024-06-24 RX ADMIN — Medication 50 MICROGRAM(S): at 06:12

## 2024-06-24 RX ADMIN — METFORMIN HYDROCHLORIDE 500 MILLIGRAM(S): 850 TABLET, FILM COATED ORAL at 20:30

## 2024-06-24 NOTE — BH SOCIAL WORK INITIAL PSYCHOSOCIAL EVALUATION - DETAILS
RECORDS RECEIVED FROM: Bernard Ortega MD // Liver lesion   Appt Date: 9/28/2023   NOTES STATUS DETAILS   OFFICE NOTE from referring provider Internal 7/17/2023, 6/29/2023 OV with JEANE Ortega   OFFICE NOTES from other specialists N/A    DISCHARGE SUMMARY from hospital N/A    MEDICATION LIST Internal    LIVER BIOSPY (IF APPLICABLE)      PATHOLOGY REPORTS  N/A    IMAGING     ENDOSCOPY (IF AVAILABLE) N/A    COLONOSCOPY (IF AVAILABLE) Care Everywhere 7/2/2019 [facility]   ULTRASOUND LIVER N/A    CT OF ABDOMEN Internal 7/3/2023  4/29/2021 CT ABD PEL - [facility]   MRI OF LIVER Internal 7/13/2023   FIBROSCAN, US ELASTOGRAPHY, FIBROSIS SCAN, MR ELASTOGRAPHY N/A    LABS     HEPATIC PANEL (LIVER PANEL) N/A    BASIC METABOLIC PANEL Internal 10/27/2022   COMPLETE METABOLIC PANEL Internal 6/29/2023, 11/9/2022   COMPLETE BLOOD COUNT (CBC) Internal 6/29/2023, 10/27/2022       
Alcohol
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
## 2024-06-25 LAB
GLUCOSE BLDC GLUCOMTR-MCNC: 103 MG/DL — HIGH (ref 70–99)
GLUCOSE BLDC GLUCOMTR-MCNC: 90 MG/DL — SIGNIFICANT CHANGE UP (ref 70–99)
GLUCOSE BLDC GLUCOMTR-MCNC: 91 MG/DL — SIGNIFICANT CHANGE UP (ref 70–99)
GLUCOSE BLDC GLUCOMTR-MCNC: 95 MG/DL — SIGNIFICANT CHANGE UP (ref 70–99)

## 2024-06-25 PROCEDURE — 99232 SBSQ HOSP IP/OBS MODERATE 35: CPT

## 2024-06-25 RX ADMIN — Medication 5 MILLIGRAM(S): at 08:02

## 2024-06-25 RX ADMIN — AMLODIPINE BESYLATE 5 MILLIGRAM(S): 2.5 TABLET ORAL at 08:03

## 2024-06-25 RX ADMIN — LABETALOL HYDROCHLORIDE 200 MILLIGRAM(S): 300 TABLET ORAL at 08:02

## 2024-06-25 RX ADMIN — LABETALOL HYDROCHLORIDE 200 MILLIGRAM(S): 300 TABLET ORAL at 20:33

## 2024-06-25 RX ADMIN — METFORMIN HYDROCHLORIDE 500 MILLIGRAM(S): 850 TABLET, FILM COATED ORAL at 20:33

## 2024-06-25 RX ADMIN — Medication 7 MILLIGRAM(S): at 20:33

## 2024-06-25 RX ADMIN — LOSARTAN POTASSIUM 100 MILLIGRAM(S): 100 TABLET, FILM COATED ORAL at 08:02

## 2024-06-25 RX ADMIN — ATORVASTATIN CALCIUM 40 MILLIGRAM(S): 20 TABLET, FILM COATED ORAL at 20:33

## 2024-06-25 RX ADMIN — NICOTINE POLACRILEX 1 PATCH: 2 LOZENGE ORAL at 08:02

## 2024-06-25 RX ADMIN — Medication 50 MICROGRAM(S): at 06:11

## 2024-06-25 RX ADMIN — NICOTINE POLACRILEX 1 PATCH: 2 LOZENGE ORAL at 19:50

## 2024-06-25 RX ADMIN — METFORMIN HYDROCHLORIDE 500 MILLIGRAM(S): 850 TABLET, FILM COATED ORAL at 08:03

## 2024-06-25 NOTE — BH DISCHARGE NOTE NURSING/SOCIAL WORK/PSYCH REHAB - NSCDUDCCRISIS_PSY_A_CORE
[facility]  1 [phone]) [facility]-WELL ([phone])  Text "WELL" to 16270  Website: www.[place].DivX/.National Suicide Prevention Lifeline 1 (176) 462-5493/.  [facility]  1 (186) LIFENET (640-2751)/.  [facility]  [address] [place] [id number] [phone]   Hours:  Monday through Friday from 9 AM to 3 PM

## 2024-06-25 NOTE — BH DISCHARGE NOTE NURSING/SOCIAL WORK/PSYCH REHAB - NSDCPEWEB_GEN_ALL_CORE
[facility] for Tobacco Control website --- [email or web address] website --- [email or web address]

## 2024-06-25 NOTE — BH DISCHARGE NOTE NURSING/SOCIAL WORK/PSYCH REHAB - NSDCPRGOAL_PSY_ALL_CORE
Pt made some progress towards discharge. Pt has verbalized coping skills such as exercise, go for walk, and watch TV to manage symptoms. Pt appears anxious towards his discharge. However, pt agreed to utilize identified coping skills and reach out to his care coordination and Dr. Cook if his anxiety gets worse. Pt has shared [facility] and [facility] his treatment post-discharge. Pt has demonstrated compliant with medication. Pt reported symptom improvement as less anxious, no more paranoia, no more SI. During this hospitalization, pt showed approximately 90% of group attendance. During the group session, pt was able to tolerate group structure, actively participate relevantly. Pt showed good interaction with others. Pt maintained fair ADLs. Pt has participate in his safety plan.  Pt made some progress towards discharge. Pt has verbalized coping skills such as exercise, go for walk, and watch TV to manage symptoms. Pt appears anxious towards his discharge. However, pt agreed to utilize identified coping skills and reach out to his care coordination and Dr. Cook if his anxiety gets worse. Pt has shared his goal [facility] and continue his treatment post-discharge. Pt has demonstrated compliant with medication. Pt reported symptom improvement as less anxious, no more paranoia, no more SI. During this hospitalization, pt showed approximately 80% of group attendance. During the group session, pt was able to tolerate group structure, actively participate relevantly. Pt showed good interaction with others. Pt maintained fair ADLs. Pt has participate in his safety plan.

## 2024-06-25 NOTE — BH DISCHARGE NOTE NURSING/SOCIAL WORK/PSYCH REHAB - NSDCPEEMAIL_GEN_ALL_CORE
[facility] for Tobacco Control email [email or web address]

## 2024-06-25 NOTE — BH DISCHARGE NOTE NURSING/SOCIAL WORK/PSYCH REHAB - PATIENT PORTAL LINK FT
You can access the FollowMyHealth Patient Portal offered by [facility] by registering at the following website: [email or web address]. By joining [employer]’s FollowMyHealth portal, you will also be able to view your health information using other applications (apps) compatible with our system.

## 2024-06-26 LAB
GLUCOSE BLDC GLUCOMTR-MCNC: 119 MG/DL — HIGH (ref 70–99)
GLUCOSE BLDC GLUCOMTR-MCNC: 121 MG/DL — HIGH (ref 70–99)
GLUCOSE BLDC GLUCOMTR-MCNC: 136 MG/DL — HIGH (ref 70–99)
GLUCOSE BLDC GLUCOMTR-MCNC: 90 MG/DL — SIGNIFICANT CHANGE UP (ref 70–99)

## 2024-06-26 PROCEDURE — 99232 SBSQ HOSP IP/OBS MODERATE 35: CPT

## 2024-06-26 RX ADMIN — Medication 7 MILLIGRAM(S): at 20:03

## 2024-06-26 RX ADMIN — Medication 5 MILLIGRAM(S): at 08:03

## 2024-06-26 RX ADMIN — ATORVASTATIN CALCIUM 40 MILLIGRAM(S): 20 TABLET, FILM COATED ORAL at 20:03

## 2024-06-26 RX ADMIN — LABETALOL HYDROCHLORIDE 200 MILLIGRAM(S): 300 TABLET ORAL at 08:03

## 2024-06-26 RX ADMIN — NICOTINE POLACRILEX 1 PATCH: 2 LOZENGE ORAL at 07:50

## 2024-06-26 RX ADMIN — METFORMIN HYDROCHLORIDE 500 MILLIGRAM(S): 850 TABLET, FILM COATED ORAL at 08:03

## 2024-06-26 RX ADMIN — Medication 2 MILLIGRAM(S): at 17:02

## 2024-06-26 RX ADMIN — LABETALOL HYDROCHLORIDE 200 MILLIGRAM(S): 300 TABLET ORAL at 20:03

## 2024-06-26 RX ADMIN — NICOTINE POLACRILEX 1 PATCH: 2 LOZENGE ORAL at 08:56

## 2024-06-26 RX ADMIN — LOSARTAN POTASSIUM 100 MILLIGRAM(S): 100 TABLET, FILM COATED ORAL at 09:01

## 2024-06-26 RX ADMIN — METFORMIN HYDROCHLORIDE 500 MILLIGRAM(S): 850 TABLET, FILM COATED ORAL at 20:02

## 2024-06-26 RX ADMIN — NICOTINE POLACRILEX 1 PATCH: 2 LOZENGE ORAL at 19:38

## 2024-06-26 RX ADMIN — NICOTINE POLACRILEX 1 PATCH: 2 LOZENGE ORAL at 08:03

## 2024-06-26 RX ADMIN — AMLODIPINE BESYLATE 5 MILLIGRAM(S): 2.5 TABLET ORAL at 08:03

## 2024-06-26 RX ADMIN — Medication 5 MILLIGRAM(S): at 17:03

## 2024-06-27 LAB
ALBUMIN SERPL ELPH-MCNC: 4.2 G/DL — SIGNIFICANT CHANGE UP (ref 3.3–5)
ALP SERPL-CCNC: 116 U/L — SIGNIFICANT CHANGE UP (ref 40–120)
ALT FLD-CCNC: 19 U/L — SIGNIFICANT CHANGE UP (ref 4–41)
ANION GAP SERPL CALC-SCNC: 11 MMOL/L — SIGNIFICANT CHANGE UP (ref 7–14)
AST SERPL-CCNC: 23 U/L — SIGNIFICANT CHANGE UP (ref 4–40)
BILIRUB SERPL-MCNC: 0.2 MG/DL — SIGNIFICANT CHANGE UP (ref 0.2–1.2)
BUN SERPL-MCNC: 18 MG/DL — SIGNIFICANT CHANGE UP (ref 7–23)
CALCIUM SERPL-MCNC: 9.2 MG/DL — SIGNIFICANT CHANGE UP (ref 8.4–10.5)
CHLORIDE SERPL-SCNC: 98 MMOL/L — SIGNIFICANT CHANGE UP (ref 98–107)
CO2 SERPL-SCNC: 24 MMOL/L — SIGNIFICANT CHANGE UP (ref 22–31)
CREAT SERPL-MCNC: 0.85 MG/DL — SIGNIFICANT CHANGE UP (ref 0.5–1.3)
EGFR: 102 ML/MIN/1.73M2 — SIGNIFICANT CHANGE UP
GLUCOSE BLDC GLUCOMTR-MCNC: 140 MG/DL — HIGH (ref 70–99)
GLUCOSE BLDC GLUCOMTR-MCNC: 173 MG/DL — HIGH (ref 70–99)
GLUCOSE BLDC GLUCOMTR-MCNC: 91 MG/DL — SIGNIFICANT CHANGE UP (ref 70–99)
GLUCOSE BLDC GLUCOMTR-MCNC: 99 MG/DL — SIGNIFICANT CHANGE UP (ref 70–99)
GLUCOSE SERPL-MCNC: 138 MG/DL — HIGH (ref 70–99)
POTASSIUM SERPL-MCNC: 4.7 MMOL/L — SIGNIFICANT CHANGE UP (ref 3.5–5.3)
POTASSIUM SERPL-SCNC: 4.7 MMOL/L — SIGNIFICANT CHANGE UP (ref 3.5–5.3)
PROT SERPL-MCNC: 7.1 G/DL — SIGNIFICANT CHANGE UP (ref 6–8.3)
SODIUM SERPL-SCNC: 133 MMOL/L — LOW (ref 135–145)

## 2024-06-27 PROCEDURE — 99232 SBSQ HOSP IP/OBS MODERATE 35: CPT

## 2024-06-27 RX ORDER — HYDROXYZINE PAMOATE 50 MG/1
25 CAPSULE ORAL EVERY 6 HOURS
Refills: 0 | Status: DISCONTINUED | OUTPATIENT
Start: 2024-06-27 | End: 2024-07-07

## 2024-06-27 RX ADMIN — LABETALOL HYDROCHLORIDE 200 MILLIGRAM(S): 300 TABLET ORAL at 08:13

## 2024-06-27 RX ADMIN — AMLODIPINE BESYLATE 5 MILLIGRAM(S): 2.5 TABLET ORAL at 08:13

## 2024-06-27 RX ADMIN — Medication 7 MILLIGRAM(S): at 20:45

## 2024-06-27 RX ADMIN — METFORMIN HYDROCHLORIDE 500 MILLIGRAM(S): 850 TABLET, FILM COATED ORAL at 08:14

## 2024-06-27 RX ADMIN — NICOTINE POLACRILEX 1 PATCH: 2 LOZENGE ORAL at 08:14

## 2024-06-27 RX ADMIN — LOSARTAN POTASSIUM 100 MILLIGRAM(S): 100 TABLET, FILM COATED ORAL at 08:14

## 2024-06-27 RX ADMIN — INSULIN LISPRO 1: 100 INJECTION, SOLUTION SUBCUTANEOUS at 16:28

## 2024-06-27 RX ADMIN — METFORMIN HYDROCHLORIDE 500 MILLIGRAM(S): 850 TABLET, FILM COATED ORAL at 20:45

## 2024-06-27 RX ADMIN — HYDROXYZINE PAMOATE 25 MILLIGRAM(S): 50 CAPSULE ORAL at 22:23

## 2024-06-27 RX ADMIN — Medication 5 MILLIGRAM(S): at 08:14

## 2024-06-27 RX ADMIN — LABETALOL HYDROCHLORIDE 200 MILLIGRAM(S): 300 TABLET ORAL at 20:37

## 2024-06-27 RX ADMIN — ATORVASTATIN CALCIUM 40 MILLIGRAM(S): 20 TABLET, FILM COATED ORAL at 20:37

## 2024-06-27 RX ADMIN — NICOTINE POLACRILEX 1 PATCH: 2 LOZENGE ORAL at 20:43

## 2024-06-28 LAB
ALBUMIN SERPL ELPH-MCNC: 4.3 G/DL — SIGNIFICANT CHANGE UP (ref 3.3–5)
ALP SERPL-CCNC: 128 U/L — HIGH (ref 40–120)
ALT FLD-CCNC: 20 U/L — SIGNIFICANT CHANGE UP (ref 4–41)
ANION GAP SERPL CALC-SCNC: 14 MMOL/L — SIGNIFICANT CHANGE UP (ref 7–14)
AST SERPL-CCNC: 22 U/L — SIGNIFICANT CHANGE UP (ref 4–40)
BILIRUB SERPL-MCNC: 0.3 MG/DL — SIGNIFICANT CHANGE UP (ref 0.2–1.2)
BUN SERPL-MCNC: 19 MG/DL — SIGNIFICANT CHANGE UP (ref 7–23)
CALCIUM SERPL-MCNC: 9.4 MG/DL — SIGNIFICANT CHANGE UP (ref 8.4–10.5)
CHLORIDE SERPL-SCNC: 95 MMOL/L — LOW (ref 98–107)
CO2 SERPL-SCNC: 24 MMOL/L — SIGNIFICANT CHANGE UP (ref 22–31)
CREAT SERPL-MCNC: 0.79 MG/DL — SIGNIFICANT CHANGE UP (ref 0.5–1.3)
EGFR: 104 ML/MIN/1.73M2 — SIGNIFICANT CHANGE UP
GLUCOSE BLDC GLUCOMTR-MCNC: 101 MG/DL — HIGH (ref 70–99)
GLUCOSE BLDC GLUCOMTR-MCNC: 103 MG/DL — HIGH (ref 70–99)
GLUCOSE BLDC GLUCOMTR-MCNC: 116 MG/DL — HIGH (ref 70–99)
GLUCOSE BLDC GLUCOMTR-MCNC: 93 MG/DL — SIGNIFICANT CHANGE UP (ref 70–99)
GLUCOSE SERPL-MCNC: 129 MG/DL — HIGH (ref 70–99)
POTASSIUM SERPL-MCNC: 4.8 MMOL/L — SIGNIFICANT CHANGE UP (ref 3.5–5.3)
POTASSIUM SERPL-SCNC: 4.8 MMOL/L — SIGNIFICANT CHANGE UP (ref 3.5–5.3)
PROT SERPL-MCNC: 7.1 G/DL — SIGNIFICANT CHANGE UP (ref 6–8.3)
SODIUM SERPL-SCNC: 133 MMOL/L — LOW (ref 135–145)

## 2024-06-28 PROCEDURE — 99232 SBSQ HOSP IP/OBS MODERATE 35: CPT

## 2024-06-28 RX ORDER — HALOPERIDOL DECANOATE 100 MG/ML
10 VIAL (ML) INTRAMUSCULAR AT BEDTIME
Refills: 0 | Status: DISCONTINUED | OUTPATIENT
Start: 2024-06-28 | End: 2024-07-02

## 2024-06-28 RX ADMIN — ATORVASTATIN CALCIUM 40 MILLIGRAM(S): 20 TABLET, FILM COATED ORAL at 20:22

## 2024-06-28 RX ADMIN — Medication 10 MILLIGRAM(S): at 20:22

## 2024-06-28 RX ADMIN — LABETALOL HYDROCHLORIDE 200 MILLIGRAM(S): 300 TABLET ORAL at 20:22

## 2024-06-28 RX ADMIN — Medication 5 MILLIGRAM(S): at 08:15

## 2024-06-28 RX ADMIN — AMLODIPINE BESYLATE 5 MILLIGRAM(S): 2.5 TABLET ORAL at 08:14

## 2024-06-28 RX ADMIN — METFORMIN HYDROCHLORIDE 500 MILLIGRAM(S): 850 TABLET, FILM COATED ORAL at 20:21

## 2024-06-28 RX ADMIN — LOSARTAN POTASSIUM 100 MILLIGRAM(S): 100 TABLET, FILM COATED ORAL at 08:14

## 2024-06-28 RX ADMIN — LABETALOL HYDROCHLORIDE 200 MILLIGRAM(S): 300 TABLET ORAL at 08:14

## 2024-06-28 RX ADMIN — NICOTINE POLACRILEX 1 PATCH: 2 LOZENGE ORAL at 08:15

## 2024-06-28 RX ADMIN — METFORMIN HYDROCHLORIDE 500 MILLIGRAM(S): 850 TABLET, FILM COATED ORAL at 08:15

## 2024-06-29 LAB
GLUCOSE BLDC GLUCOMTR-MCNC: 115 MG/DL — HIGH (ref 70–99)
GLUCOSE BLDC GLUCOMTR-MCNC: 140 MG/DL — HIGH (ref 70–99)
GLUCOSE BLDC GLUCOMTR-MCNC: 82 MG/DL — SIGNIFICANT CHANGE UP (ref 70–99)
GLUCOSE BLDC GLUCOMTR-MCNC: 92 MG/DL — SIGNIFICANT CHANGE UP (ref 70–99)

## 2024-06-29 PROCEDURE — 99232 SBSQ HOSP IP/OBS MODERATE 35: CPT

## 2024-06-29 RX ORDER — LORAZEPAM 0.5 MG
2 TABLET ORAL ONCE
Refills: 0 | Status: DISCONTINUED | OUTPATIENT
Start: 2024-06-29 | End: 2024-07-06

## 2024-06-29 RX ORDER — LORAZEPAM 0.5 MG
2 TABLET ORAL EVERY 6 HOURS
Refills: 0 | Status: DISCONTINUED | OUTPATIENT
Start: 2024-06-29 | End: 2024-07-06

## 2024-06-29 RX ADMIN — METFORMIN HYDROCHLORIDE 500 MILLIGRAM(S): 850 TABLET, FILM COATED ORAL at 08:20

## 2024-06-29 RX ADMIN — LABETALOL HYDROCHLORIDE 200 MILLIGRAM(S): 300 TABLET ORAL at 20:17

## 2024-06-29 RX ADMIN — ATORVASTATIN CALCIUM 40 MILLIGRAM(S): 20 TABLET, FILM COATED ORAL at 20:18

## 2024-06-29 RX ADMIN — METFORMIN HYDROCHLORIDE 500 MILLIGRAM(S): 850 TABLET, FILM COATED ORAL at 20:17

## 2024-06-29 RX ADMIN — LABETALOL HYDROCHLORIDE 200 MILLIGRAM(S): 300 TABLET ORAL at 08:20

## 2024-06-29 RX ADMIN — HYDROXYZINE PAMOATE 25 MILLIGRAM(S): 50 CAPSULE ORAL at 22:02

## 2024-06-29 RX ADMIN — LOSARTAN POTASSIUM 100 MILLIGRAM(S): 100 TABLET, FILM COATED ORAL at 08:21

## 2024-06-29 RX ADMIN — Medication 5 MILLIGRAM(S): at 08:20

## 2024-06-29 RX ADMIN — Medication 10 MILLIGRAM(S): at 20:17

## 2024-06-29 RX ADMIN — AMLODIPINE BESYLATE 5 MILLIGRAM(S): 2.5 TABLET ORAL at 08:20

## 2024-06-29 RX ADMIN — Medication 50 MICROGRAM(S): at 06:16

## 2024-06-30 LAB
GLUCOSE BLDC GLUCOMTR-MCNC: 109 MG/DL — HIGH (ref 70–99)
GLUCOSE BLDC GLUCOMTR-MCNC: 113 MG/DL — HIGH (ref 70–99)
GLUCOSE BLDC GLUCOMTR-MCNC: 77 MG/DL — SIGNIFICANT CHANGE UP (ref 70–99)
GLUCOSE BLDC GLUCOMTR-MCNC: 97 MG/DL — SIGNIFICANT CHANGE UP (ref 70–99)

## 2024-06-30 RX ADMIN — METFORMIN HYDROCHLORIDE 500 MILLIGRAM(S): 850 TABLET, FILM COATED ORAL at 20:02

## 2024-06-30 RX ADMIN — LABETALOL HYDROCHLORIDE 200 MILLIGRAM(S): 300 TABLET ORAL at 08:12

## 2024-06-30 RX ADMIN — AMLODIPINE BESYLATE 5 MILLIGRAM(S): 2.5 TABLET ORAL at 08:12

## 2024-06-30 RX ADMIN — LOSARTAN POTASSIUM 100 MILLIGRAM(S): 100 TABLET, FILM COATED ORAL at 08:12

## 2024-06-30 RX ADMIN — Medication 50 MICROGRAM(S): at 07:00

## 2024-06-30 RX ADMIN — METFORMIN HYDROCHLORIDE 500 MILLIGRAM(S): 850 TABLET, FILM COATED ORAL at 08:16

## 2024-06-30 RX ADMIN — ATORVASTATIN CALCIUM 40 MILLIGRAM(S): 20 TABLET, FILM COATED ORAL at 20:02

## 2024-06-30 RX ADMIN — Medication 5 MILLIGRAM(S): at 08:12

## 2024-06-30 RX ADMIN — Medication 10 MILLIGRAM(S): at 20:02

## 2024-06-30 RX ADMIN — LABETALOL HYDROCHLORIDE 200 MILLIGRAM(S): 300 TABLET ORAL at 20:02

## 2024-06-30 NOTE — BH SAFETY PLAN - ASK FOR HELP NAME 1
Dr. Cook
[de-identified] : He is feeling very well.  The soreness on the left side of his throat is better, it just hurts a little on and off. He has been following a healthy diet and exercising and has lost weight.

## 2024-06-30 NOTE — BH SAFETY PLAN - DISTRACTION NAME 2
It looks like she has only gone to the initial PT assessment but no therapy yet. The therapist did think she had good potential for rehab and her insurance might not cover the MRI if she has not done therapy to try and relieve symptoms first. Please check with patient and see what she would like to do.    My sister Susannah

## 2024-07-01 LAB
GLUCOSE BLDC GLUCOMTR-MCNC: 108 MG/DL — HIGH (ref 70–99)
GLUCOSE BLDC GLUCOMTR-MCNC: 120 MG/DL — HIGH (ref 70–99)
GLUCOSE BLDC GLUCOMTR-MCNC: 171 MG/DL — HIGH (ref 70–99)
GLUCOSE BLDC GLUCOMTR-MCNC: 79 MG/DL — SIGNIFICANT CHANGE UP (ref 70–99)
GLUCOSE BLDC GLUCOMTR-MCNC: 85 MG/DL — SIGNIFICANT CHANGE UP (ref 70–99)

## 2024-07-01 PROCEDURE — 99232 SBSQ HOSP IP/OBS MODERATE 35: CPT

## 2024-07-01 RX ADMIN — Medication 5 MILLIGRAM(S): at 08:06

## 2024-07-01 RX ADMIN — METFORMIN HYDROCHLORIDE 500 MILLIGRAM(S): 850 TABLET, FILM COATED ORAL at 20:19

## 2024-07-01 RX ADMIN — NICOTINE POLACRILEX 1 PATCH: 2 LOZENGE ORAL at 08:06

## 2024-07-01 RX ADMIN — LABETALOL HYDROCHLORIDE 200 MILLIGRAM(S): 300 TABLET ORAL at 20:19

## 2024-07-01 RX ADMIN — LOSARTAN POTASSIUM 100 MILLIGRAM(S): 100 TABLET, FILM COATED ORAL at 08:06

## 2024-07-01 RX ADMIN — LABETALOL HYDROCHLORIDE 200 MILLIGRAM(S): 300 TABLET ORAL at 08:06

## 2024-07-01 RX ADMIN — METFORMIN HYDROCHLORIDE 500 MILLIGRAM(S): 850 TABLET, FILM COATED ORAL at 08:06

## 2024-07-01 RX ADMIN — INSULIN LISPRO 1: 100 INJECTION, SOLUTION SUBCUTANEOUS at 16:41

## 2024-07-01 RX ADMIN — ATORVASTATIN CALCIUM 40 MILLIGRAM(S): 20 TABLET, FILM COATED ORAL at 20:19

## 2024-07-01 RX ADMIN — Medication 2 MILLIGRAM(S): at 13:54

## 2024-07-01 RX ADMIN — AMLODIPINE BESYLATE 5 MILLIGRAM(S): 2.5 TABLET ORAL at 08:05

## 2024-07-01 RX ADMIN — Medication 10 MILLIGRAM(S): at 20:19

## 2024-07-02 LAB
GLUCOSE BLDC GLUCOMTR-MCNC: 126 MG/DL — HIGH (ref 70–99)
GLUCOSE BLDC GLUCOMTR-MCNC: 129 MG/DL — HIGH (ref 70–99)
GLUCOSE BLDC GLUCOMTR-MCNC: 82 MG/DL — SIGNIFICANT CHANGE UP (ref 70–99)
GLUCOSE BLDC GLUCOMTR-MCNC: 91 MG/DL — SIGNIFICANT CHANGE UP (ref 70–99)

## 2024-07-02 PROCEDURE — 99232 SBSQ HOSP IP/OBS MODERATE 35: CPT

## 2024-07-02 RX ORDER — POLYETHYLENE GLYCOL 3350 1 G/G
17 POWDER ORAL DAILY
Refills: 0 | Status: DISCONTINUED | OUTPATIENT
Start: 2024-07-02 | End: 2024-07-12

## 2024-07-02 RX ORDER — HALOPERIDOL DECANOATE 100 MG/ML
10 VIAL (ML) INTRAMUSCULAR
Refills: 0 | Status: DISCONTINUED | OUTPATIENT
Start: 2024-07-02 | End: 2024-07-12

## 2024-07-02 RX ADMIN — METFORMIN HYDROCHLORIDE 500 MILLIGRAM(S): 850 TABLET, FILM COATED ORAL at 08:36

## 2024-07-02 RX ADMIN — LABETALOL HYDROCHLORIDE 200 MILLIGRAM(S): 300 TABLET ORAL at 20:15

## 2024-07-02 RX ADMIN — NICOTINE POLACRILEX 1 PATCH: 2 LOZENGE ORAL at 07:49

## 2024-07-02 RX ADMIN — METFORMIN HYDROCHLORIDE 500 MILLIGRAM(S): 850 TABLET, FILM COATED ORAL at 20:15

## 2024-07-02 RX ADMIN — POLYETHYLENE GLYCOL 3350 17 GRAM(S): 1 POWDER ORAL at 14:33

## 2024-07-02 RX ADMIN — NICOTINE POLACRILEX 1 PATCH: 2 LOZENGE ORAL at 08:00

## 2024-07-02 RX ADMIN — Medication 50 MICROGRAM(S): at 06:05

## 2024-07-02 RX ADMIN — Medication 5 MILLIGRAM(S): at 08:37

## 2024-07-02 RX ADMIN — Medication 2 MILLIGRAM(S): at 12:38

## 2024-07-02 RX ADMIN — LABETALOL HYDROCHLORIDE 200 MILLIGRAM(S): 300 TABLET ORAL at 08:36

## 2024-07-02 RX ADMIN — Medication 5 MILLIGRAM(S): at 12:38

## 2024-07-02 RX ADMIN — LOSARTAN POTASSIUM 100 MILLIGRAM(S): 100 TABLET, FILM COATED ORAL at 08:37

## 2024-07-02 RX ADMIN — AMLODIPINE BESYLATE 5 MILLIGRAM(S): 2.5 TABLET ORAL at 08:37

## 2024-07-02 RX ADMIN — Medication 10 MILLIGRAM(S): at 20:16

## 2024-07-02 RX ADMIN — ATORVASTATIN CALCIUM 40 MILLIGRAM(S): 20 TABLET, FILM COATED ORAL at 20:15

## 2024-07-02 RX ADMIN — NICOTINE POLACRILEX 1 PATCH: 2 LOZENGE ORAL at 08:36

## 2024-07-03 LAB
GLUCOSE BLDC GLUCOMTR-MCNC: 104 MG/DL — HIGH (ref 70–99)
GLUCOSE BLDC GLUCOMTR-MCNC: 112 MG/DL — HIGH (ref 70–99)
GLUCOSE BLDC GLUCOMTR-MCNC: 155 MG/DL — HIGH (ref 70–99)
GLUCOSE BLDC GLUCOMTR-MCNC: 97 MG/DL — SIGNIFICANT CHANGE UP (ref 70–99)

## 2024-07-03 PROCEDURE — 99232 SBSQ HOSP IP/OBS MODERATE 35: CPT

## 2024-07-03 RX ADMIN — LABETALOL HYDROCHLORIDE 200 MILLIGRAM(S): 300 TABLET ORAL at 08:19

## 2024-07-03 RX ADMIN — LABETALOL HYDROCHLORIDE 200 MILLIGRAM(S): 300 TABLET ORAL at 20:18

## 2024-07-03 RX ADMIN — AMLODIPINE BESYLATE 5 MILLIGRAM(S): 2.5 TABLET ORAL at 08:19

## 2024-07-03 RX ADMIN — Medication 10 MILLIGRAM(S): at 08:20

## 2024-07-03 RX ADMIN — LOSARTAN POTASSIUM 100 MILLIGRAM(S): 100 TABLET, FILM COATED ORAL at 08:20

## 2024-07-03 RX ADMIN — INSULIN LISPRO 1: 100 INJECTION, SOLUTION SUBCUTANEOUS at 17:07

## 2024-07-03 RX ADMIN — NICOTINE POLACRILEX 1 PATCH: 2 LOZENGE ORAL at 08:19

## 2024-07-03 RX ADMIN — Medication 10 MILLIGRAM(S): at 20:19

## 2024-07-03 RX ADMIN — Medication 50 MICROGRAM(S): at 05:50

## 2024-07-03 RX ADMIN — ATORVASTATIN CALCIUM 40 MILLIGRAM(S): 20 TABLET, FILM COATED ORAL at 20:19

## 2024-07-03 RX ADMIN — METFORMIN HYDROCHLORIDE 500 MILLIGRAM(S): 850 TABLET, FILM COATED ORAL at 08:19

## 2024-07-03 RX ADMIN — METFORMIN HYDROCHLORIDE 500 MILLIGRAM(S): 850 TABLET, FILM COATED ORAL at 20:19

## 2024-07-04 LAB
GLUCOSE BLDC GLUCOMTR-MCNC: 123 MG/DL — HIGH (ref 70–99)
GLUCOSE BLDC GLUCOMTR-MCNC: 126 MG/DL — HIGH (ref 70–99)
GLUCOSE BLDC GLUCOMTR-MCNC: 81 MG/DL — SIGNIFICANT CHANGE UP (ref 70–99)
GLUCOSE BLDC GLUCOMTR-MCNC: 96 MG/DL — SIGNIFICANT CHANGE UP (ref 70–99)

## 2024-07-04 RX ADMIN — NICOTINE POLACRILEX 1 PATCH: 2 LOZENGE ORAL at 08:19

## 2024-07-04 RX ADMIN — METFORMIN HYDROCHLORIDE 500 MILLIGRAM(S): 850 TABLET, FILM COATED ORAL at 08:07

## 2024-07-04 RX ADMIN — LABETALOL HYDROCHLORIDE 200 MILLIGRAM(S): 300 TABLET ORAL at 20:05

## 2024-07-04 RX ADMIN — Medication 2 MILLIGRAM(S): at 12:39

## 2024-07-04 RX ADMIN — NICOTINE POLACRILEX 1 PATCH: 2 LOZENGE ORAL at 07:30

## 2024-07-04 RX ADMIN — AMLODIPINE BESYLATE 5 MILLIGRAM(S): 2.5 TABLET ORAL at 08:07

## 2024-07-04 RX ADMIN — ATORVASTATIN CALCIUM 40 MILLIGRAM(S): 20 TABLET, FILM COATED ORAL at 20:04

## 2024-07-04 RX ADMIN — NICOTINE POLACRILEX 1 PATCH: 2 LOZENGE ORAL at 08:06

## 2024-07-04 RX ADMIN — Medication 50 MICROGRAM(S): at 06:11

## 2024-07-04 RX ADMIN — Medication 10 MILLIGRAM(S): at 20:04

## 2024-07-04 RX ADMIN — Medication 10 MILLIGRAM(S): at 08:07

## 2024-07-04 RX ADMIN — NICOTINE POLACRILEX 1 PATCH: 2 LOZENGE ORAL at 20:03

## 2024-07-04 RX ADMIN — LABETALOL HYDROCHLORIDE 200 MILLIGRAM(S): 300 TABLET ORAL at 08:07

## 2024-07-04 RX ADMIN — METFORMIN HYDROCHLORIDE 500 MILLIGRAM(S): 850 TABLET, FILM COATED ORAL at 20:05

## 2024-07-04 RX ADMIN — LOSARTAN POTASSIUM 100 MILLIGRAM(S): 100 TABLET, FILM COATED ORAL at 08:07

## 2024-07-05 LAB
GLUCOSE BLDC GLUCOMTR-MCNC: 100 MG/DL — HIGH (ref 70–99)
GLUCOSE BLDC GLUCOMTR-MCNC: 105 MG/DL — HIGH (ref 70–99)
GLUCOSE BLDC GLUCOMTR-MCNC: 116 MG/DL — HIGH (ref 70–99)
GLUCOSE BLDC GLUCOMTR-MCNC: 91 MG/DL — SIGNIFICANT CHANGE UP (ref 70–99)

## 2024-07-05 PROCEDURE — 99232 SBSQ HOSP IP/OBS MODERATE 35: CPT

## 2024-07-05 RX ORDER — HALOPERIDOL DECANOATE 100 MG/ML
100 VIAL (ML) INTRAMUSCULAR ONCE
Refills: 0 | Status: COMPLETED | OUTPATIENT
Start: 2024-07-05 | End: 2024-07-05

## 2024-07-05 RX ADMIN — LOSARTAN POTASSIUM 100 MILLIGRAM(S): 100 TABLET, FILM COATED ORAL at 08:23

## 2024-07-05 RX ADMIN — NICOTINE POLACRILEX 1 PATCH: 2 LOZENGE ORAL at 08:48

## 2024-07-05 RX ADMIN — LABETALOL HYDROCHLORIDE 200 MILLIGRAM(S): 300 TABLET ORAL at 08:23

## 2024-07-05 RX ADMIN — NICOTINE POLACRILEX 1 PATCH: 2 LOZENGE ORAL at 08:25

## 2024-07-05 RX ADMIN — Medication 50 MICROGRAM(S): at 06:07

## 2024-07-05 RX ADMIN — METFORMIN HYDROCHLORIDE 500 MILLIGRAM(S): 850 TABLET, FILM COATED ORAL at 19:50

## 2024-07-05 RX ADMIN — AMLODIPINE BESYLATE 5 MILLIGRAM(S): 2.5 TABLET ORAL at 08:23

## 2024-07-05 RX ADMIN — Medication 10 MILLIGRAM(S): at 19:50

## 2024-07-05 RX ADMIN — LABETALOL HYDROCHLORIDE 200 MILLIGRAM(S): 300 TABLET ORAL at 19:50

## 2024-07-05 RX ADMIN — Medication 2 MILLIGRAM(S): at 13:44

## 2024-07-05 RX ADMIN — ATORVASTATIN CALCIUM 40 MILLIGRAM(S): 20 TABLET, FILM COATED ORAL at 19:50

## 2024-07-05 RX ADMIN — METFORMIN HYDROCHLORIDE 500 MILLIGRAM(S): 850 TABLET, FILM COATED ORAL at 08:22

## 2024-07-05 RX ADMIN — Medication 10 MILLIGRAM(S): at 08:23

## 2024-07-05 RX ADMIN — Medication 100 MILLIGRAM(S): at 14:31

## 2024-07-06 LAB
GLUCOSE BLDC GLUCOMTR-MCNC: 105 MG/DL — HIGH (ref 70–99)
GLUCOSE BLDC GLUCOMTR-MCNC: 107 MG/DL — HIGH (ref 70–99)
GLUCOSE BLDC GLUCOMTR-MCNC: 90 MG/DL — SIGNIFICANT CHANGE UP (ref 70–99)

## 2024-07-06 RX ADMIN — LOSARTAN POTASSIUM 100 MILLIGRAM(S): 100 TABLET, FILM COATED ORAL at 08:07

## 2024-07-06 RX ADMIN — NICOTINE POLACRILEX 1 PATCH: 2 LOZENGE ORAL at 08:07

## 2024-07-06 RX ADMIN — AMLODIPINE BESYLATE 5 MILLIGRAM(S): 2.5 TABLET ORAL at 08:07

## 2024-07-06 RX ADMIN — Medication 50 MICROGRAM(S): at 06:09

## 2024-07-06 RX ADMIN — METFORMIN HYDROCHLORIDE 500 MILLIGRAM(S): 850 TABLET, FILM COATED ORAL at 20:35

## 2024-07-06 RX ADMIN — NICOTINE POLACRILEX 1 PATCH: 2 LOZENGE ORAL at 08:36

## 2024-07-06 RX ADMIN — LABETALOL HYDROCHLORIDE 200 MILLIGRAM(S): 300 TABLET ORAL at 08:07

## 2024-07-06 RX ADMIN — ATORVASTATIN CALCIUM 40 MILLIGRAM(S): 20 TABLET, FILM COATED ORAL at 20:35

## 2024-07-06 RX ADMIN — Medication 10 MILLIGRAM(S): at 20:35

## 2024-07-06 RX ADMIN — Medication 2 MILLIGRAM(S): at 13:00

## 2024-07-06 RX ADMIN — HYDROXYZINE PAMOATE 25 MILLIGRAM(S): 50 CAPSULE ORAL at 03:56

## 2024-07-06 RX ADMIN — Medication 10 MILLIGRAM(S): at 08:07

## 2024-07-06 RX ADMIN — METFORMIN HYDROCHLORIDE 500 MILLIGRAM(S): 850 TABLET, FILM COATED ORAL at 08:07

## 2024-07-06 RX ADMIN — LABETALOL HYDROCHLORIDE 200 MILLIGRAM(S): 300 TABLET ORAL at 20:35

## 2024-07-07 LAB
GLUCOSE BLDC GLUCOMTR-MCNC: 110 MG/DL — HIGH (ref 70–99)
GLUCOSE BLDC GLUCOMTR-MCNC: 126 MG/DL — HIGH (ref 70–99)
GLUCOSE BLDC GLUCOMTR-MCNC: 76 MG/DL — SIGNIFICANT CHANGE UP (ref 70–99)
GLUCOSE BLDC GLUCOMTR-MCNC: 95 MG/DL — SIGNIFICANT CHANGE UP (ref 70–99)

## 2024-07-07 RX ORDER — HYDROXYZINE PAMOATE 50 MG/1
50 CAPSULE ORAL EVERY 6 HOURS
Refills: 0 | Status: DISCONTINUED | OUTPATIENT
Start: 2024-07-07 | End: 2024-07-12

## 2024-07-07 RX ORDER — LORAZEPAM 0.5 MG
1 TABLET ORAL ONCE
Refills: 0 | Status: DISCONTINUED | OUTPATIENT
Start: 2024-07-07 | End: 2024-07-07

## 2024-07-07 RX ADMIN — NICOTINE POLACRILEX 1 PATCH: 2 LOZENGE ORAL at 07:30

## 2024-07-07 RX ADMIN — NICOTINE POLACRILEX 1 PATCH: 2 LOZENGE ORAL at 08:00

## 2024-07-07 RX ADMIN — Medication 50 MICROGRAM(S): at 06:17

## 2024-07-07 RX ADMIN — LABETALOL HYDROCHLORIDE 200 MILLIGRAM(S): 300 TABLET ORAL at 08:34

## 2024-07-07 RX ADMIN — HYDROXYZINE PAMOATE 25 MILLIGRAM(S): 50 CAPSULE ORAL at 14:00

## 2024-07-07 RX ADMIN — METFORMIN HYDROCHLORIDE 500 MILLIGRAM(S): 850 TABLET, FILM COATED ORAL at 20:02

## 2024-07-07 RX ADMIN — LOSARTAN POTASSIUM 100 MILLIGRAM(S): 100 TABLET, FILM COATED ORAL at 08:33

## 2024-07-07 RX ADMIN — LABETALOL HYDROCHLORIDE 200 MILLIGRAM(S): 300 TABLET ORAL at 20:03

## 2024-07-07 RX ADMIN — HYDROXYZINE PAMOATE 50 MILLIGRAM(S): 50 CAPSULE ORAL at 20:37

## 2024-07-07 RX ADMIN — Medication 10 MILLIGRAM(S): at 08:34

## 2024-07-07 RX ADMIN — ATORVASTATIN CALCIUM 40 MILLIGRAM(S): 20 TABLET, FILM COATED ORAL at 20:02

## 2024-07-07 RX ADMIN — METFORMIN HYDROCHLORIDE 500 MILLIGRAM(S): 850 TABLET, FILM COATED ORAL at 08:34

## 2024-07-07 RX ADMIN — Medication 1 MILLIGRAM(S): at 15:38

## 2024-07-07 RX ADMIN — AMLODIPINE BESYLATE 5 MILLIGRAM(S): 2.5 TABLET ORAL at 08:34

## 2024-07-07 RX ADMIN — NICOTINE POLACRILEX 1 PATCH: 2 LOZENGE ORAL at 08:32

## 2024-07-07 RX ADMIN — Medication 10 MILLIGRAM(S): at 20:05

## 2024-07-07 NOTE — BH CHART NOTE - NSEVENTNOTEFT_PSY_ALL_CORE
I have reviewed the information from the ED and evaluated the patient.  I confirm that the patient has a mental illness for which care and treatment in an inpatient psychiatric hospital is appropriate.  The patient is suitable for a voluntary status. 
JEROME paged at 3:14pm for anxiety unresponsive to Atarax 25mg at 2pm. Patient reportedly distressed, unable to self regulate. Increased Atarax PRN order to 50mg. Patient given one time dose of Ativan 1mg PRN.

## 2024-07-08 LAB
GLUCOSE BLDC GLUCOMTR-MCNC: 102 MG/DL — HIGH (ref 70–99)
GLUCOSE BLDC GLUCOMTR-MCNC: 119 MG/DL — HIGH (ref 70–99)
GLUCOSE BLDC GLUCOMTR-MCNC: 120 MG/DL — HIGH (ref 70–99)
GLUCOSE BLDC GLUCOMTR-MCNC: 70 MG/DL — SIGNIFICANT CHANGE UP (ref 70–99)

## 2024-07-08 PROCEDURE — 99232 SBSQ HOSP IP/OBS MODERATE 35: CPT

## 2024-07-08 RX ADMIN — LABETALOL HYDROCHLORIDE 200 MILLIGRAM(S): 300 TABLET ORAL at 20:01

## 2024-07-08 RX ADMIN — ATORVASTATIN CALCIUM 40 MILLIGRAM(S): 20 TABLET, FILM COATED ORAL at 20:02

## 2024-07-08 RX ADMIN — LABETALOL HYDROCHLORIDE 200 MILLIGRAM(S): 300 TABLET ORAL at 08:43

## 2024-07-08 RX ADMIN — LOSARTAN POTASSIUM 100 MILLIGRAM(S): 100 TABLET, FILM COATED ORAL at 08:43

## 2024-07-08 RX ADMIN — Medication 10 MILLIGRAM(S): at 08:43

## 2024-07-08 RX ADMIN — METFORMIN HYDROCHLORIDE 500 MILLIGRAM(S): 850 TABLET, FILM COATED ORAL at 08:44

## 2024-07-08 RX ADMIN — Medication 50 MICROGRAM(S): at 06:18

## 2024-07-08 RX ADMIN — METFORMIN HYDROCHLORIDE 500 MILLIGRAM(S): 850 TABLET, FILM COATED ORAL at 20:01

## 2024-07-08 RX ADMIN — POLYETHYLENE GLYCOL 3350 17 GRAM(S): 1 POWDER ORAL at 06:18

## 2024-07-08 RX ADMIN — Medication 10 MILLIGRAM(S): at 20:01

## 2024-07-08 RX ADMIN — AMLODIPINE BESYLATE 5 MILLIGRAM(S): 2.5 TABLET ORAL at 08:44

## 2024-07-08 RX ADMIN — HYDROXYZINE PAMOATE 50 MILLIGRAM(S): 50 CAPSULE ORAL at 21:45

## 2024-07-09 LAB
GLUCOSE BLDC GLUCOMTR-MCNC: 105 MG/DL — HIGH (ref 70–99)
GLUCOSE BLDC GLUCOMTR-MCNC: 121 MG/DL — HIGH (ref 70–99)
GLUCOSE BLDC GLUCOMTR-MCNC: 154 MG/DL — HIGH (ref 70–99)
GLUCOSE BLDC GLUCOMTR-MCNC: 93 MG/DL — SIGNIFICANT CHANGE UP (ref 70–99)

## 2024-07-09 PROCEDURE — 99232 SBSQ HOSP IP/OBS MODERATE 35: CPT | Mod: 25

## 2024-07-09 PROCEDURE — 90853 GROUP PSYCHOTHERAPY: CPT

## 2024-07-09 RX ORDER — HALOPERIDOL DECANOATE 100 MG/ML
100 VIAL (ML) INTRAMUSCULAR ONCE
Refills: 0 | Status: COMPLETED | OUTPATIENT
Start: 2024-07-10 | End: 2024-07-10

## 2024-07-09 RX ADMIN — Medication 10 MILLIGRAM(S): at 08:25

## 2024-07-09 RX ADMIN — Medication 10 MILLIGRAM(S): at 20:13

## 2024-07-09 RX ADMIN — Medication 50 MICROGRAM(S): at 06:19

## 2024-07-09 RX ADMIN — HYDROXYZINE PAMOATE 50 MILLIGRAM(S): 50 CAPSULE ORAL at 21:54

## 2024-07-09 RX ADMIN — METFORMIN HYDROCHLORIDE 500 MILLIGRAM(S): 850 TABLET, FILM COATED ORAL at 08:25

## 2024-07-09 RX ADMIN — LABETALOL HYDROCHLORIDE 200 MILLIGRAM(S): 300 TABLET ORAL at 08:25

## 2024-07-09 RX ADMIN — LABETALOL HYDROCHLORIDE 200 MILLIGRAM(S): 300 TABLET ORAL at 20:14

## 2024-07-09 RX ADMIN — ATORVASTATIN CALCIUM 40 MILLIGRAM(S): 20 TABLET, FILM COATED ORAL at 20:13

## 2024-07-09 RX ADMIN — AMLODIPINE BESYLATE 5 MILLIGRAM(S): 2.5 TABLET ORAL at 08:25

## 2024-07-09 RX ADMIN — POLYETHYLENE GLYCOL 3350 17 GRAM(S): 1 POWDER ORAL at 06:36

## 2024-07-09 RX ADMIN — METFORMIN HYDROCHLORIDE 500 MILLIGRAM(S): 850 TABLET, FILM COATED ORAL at 20:13

## 2024-07-09 RX ADMIN — NICOTINE POLACRILEX 1 PATCH: 2 LOZENGE ORAL at 08:25

## 2024-07-09 RX ADMIN — LOSARTAN POTASSIUM 100 MILLIGRAM(S): 100 TABLET, FILM COATED ORAL at 08:25

## 2024-07-09 NOTE — DIETITIAN INITIAL EVALUATION ADULT - OTHER INFO
Pt is a 59 y/o male, domiciled in supportive housing [facility], on SSD, single, unemployed, w/ PMHx HTN, DM2 (last HBa1c from 5/2024 was 5.7), hypogammaglobulinemia (for which he receives monthly infusions) PPHx schizoaffective d/o, hx of multiple inpatient psychiatric hospitalizations (last known in 2024 at [facility] due to hyponatremia on medical clearance labs), follows outpatient at [facility].  Pt self presented to the ED for suicidal ideations.   Admitted to [facility]  Met Pt in dinning area. Reports good appetite/po intake at present. No GI distress noted.   Pt edentulous, has dentures at home. States has no chewing difficulty with current regular diet. NKFA.   UBW: 254 lbs. Provided verbal and written education re: Consistent carbohydrate diet and Healthy Plate. Pt verbalized understanding.  Pt to be discharged today.

## 2024-07-09 NOTE — DIETITIAN INITIAL EVALUATION ADULT - CALCULATED FROM (CAL/KG)
STREP THROAT POSITIVE:    -Take all of your antibiotics as directed.  -Drink plenty fluids  -You will need to purchase a new toothbrush     You may gargle with hot salt water 4 times a day for the next 2 days and then you may also gargle diluted hydrogen peroxide once to twice daily to alleviate some of your throat discomfort.  Drink plenty of fluids, recommend warm tea with honey.     YOU MAY USE OVER-THE-COUNTER CEPACOL FOR SOOTHING OF YOUR THROAT.  You may wish to avoid spicy food, citrus fruits, and red sauces- as this may irritate the throat more.    You can also take a daily anti-histamine such as Zyrtec, Claritin, Xyzal, OR Allegra-IN DAYTIME; NON DROWSY) AND/OR Benadryl- AT NIGHT; DROWSY) to help with runny nose/sneezing/sore throat/cough.    You may alternate over-the-counter Tylenol and ibuprofen as needed for fever and pain, as long as you do not have any contraindications to these medications.    -If your symptoms worsen, you will need to follow-up with primary care or go to the Emergency Department    If you have been discharged from the clinic prior to your point of care test results being completed, please make sure to check your [facility] account.  If there is a change in treatment, we will communicate with you through here.  If your test is positive, and medications are ordered, these will be sent to your preferred pharmacy.   If your test is negative, no further steps needed. If you do not hear from us or have questions, please call the clinic.      - You must understand that you have received an Urgent Care treatment only and that you may be released before all of your medical problems are known or treated.   - You, the patient, will arrange for follow up care as instructed with your primary care provider or recommended specialist.   - If your condition worsens or fails to improve we recommend that you receive another evaluation at the ER immediately or contact your PCP to discuss your concerns, or  return here.   - Please do not drive or make any important decisions for 24 hours if you have received any pain medications, sedatives or mood altering drugs during your visit.    Disclaimer: This document was drafted with the use of a voice recognition device and is likely to have sound alike errors.      [id number]

## 2024-07-09 NOTE — DIETITIAN INITIAL EVALUATION ADULT - PERSON TAUGHT/METHOD
Consistent carbohydrate diet and Healthy Plate./verbal instruction/written material/patient instructed

## 2024-07-09 NOTE — DIETITIAN INITIAL EVALUATION ADULT - PERTINENT LABORATORY DATA
CAPILLARY BLOOD GLUCOSE    POCT Blood Glucose.: 93 mg/dL (09 Jul 2024 11:58)  POCT Blood Glucose.: 105 mg/dL (09 Jul 2024 07:52)  POCT Blood Glucose.: 120 mg/dL (08 Jul 2024 20:05)  POCT Blood Glucose.: 119 mg/dL (08 Jul 2024 15:56)    A1C with Estimated Average Glucose Result: 5.7 % (05-26-24 @ 06:00)

## 2024-07-09 NOTE — DIETITIAN INITIAL EVALUATION ADULT - PERTINENT MEDS FT
MEDICATIONS  (STANDING):  amLODIPine   Tablet 5 milliGRAM(s) Oral daily  atorvastatin 40 milliGRAM(s) Oral at bedtime  glucagon  Injectable 1 milliGRAM(s) IntraMuscular once  haloperidol     Tablet 10 milliGRAM(s) Oral two times a day  insulin lispro (ADMELOG) corrective regimen sliding scale   SubCutaneous three times a day before meals  labetalol 200 milliGRAM(s) Oral two times a day  levothyroxine 50 MICROGram(s) Oral daily  losartan 100 milliGRAM(s) Oral daily  metFORMIN 500 milliGRAM(s) Oral two times a day  nicotine - 21 mG/24Hr(s) Patch 1 Patch Transdermal daily    MEDICATIONS  (PRN):  dextrose Oral Gel 15 Gram(s) Oral once PRN Blood Glucose LESS THAN 70 milliGRAM(s)/deciliter  haloperidol     Tablet 5 milliGRAM(s) Oral every 6 hours PRN agitation  haloperidol    Injectable 5 milliGRAM(s) IntraMuscular Once PRN agitation  hydrOXYzine hydrochloride 50 milliGRAM(s) Oral every 6 hours PRN anxiety  polyethylene glycol 3350 17 Gram(s) Oral daily PRN constipation

## 2024-07-10 LAB
GLUCOSE BLDC GLUCOMTR-MCNC: 104 MG/DL — HIGH (ref 70–99)
GLUCOSE BLDC GLUCOMTR-MCNC: 112 MG/DL — HIGH (ref 70–99)
GLUCOSE BLDC GLUCOMTR-MCNC: 120 MG/DL — HIGH (ref 70–99)
GLUCOSE BLDC GLUCOMTR-MCNC: 88 MG/DL — SIGNIFICANT CHANGE UP (ref 70–99)

## 2024-07-10 PROCEDURE — 99232 SBSQ HOSP IP/OBS MODERATE 35: CPT

## 2024-07-10 RX ADMIN — METFORMIN HYDROCHLORIDE 500 MILLIGRAM(S): 850 TABLET, FILM COATED ORAL at 08:18

## 2024-07-10 RX ADMIN — HYDROXYZINE PAMOATE 50 MILLIGRAM(S): 50 CAPSULE ORAL at 04:02

## 2024-07-10 RX ADMIN — HYDROXYZINE PAMOATE 50 MILLIGRAM(S): 50 CAPSULE ORAL at 21:06

## 2024-07-10 RX ADMIN — LABETALOL HYDROCHLORIDE 200 MILLIGRAM(S): 300 TABLET ORAL at 19:58

## 2024-07-10 RX ADMIN — ATORVASTATIN CALCIUM 40 MILLIGRAM(S): 20 TABLET, FILM COATED ORAL at 19:58

## 2024-07-10 RX ADMIN — NICOTINE POLACRILEX 1 PATCH: 2 LOZENGE ORAL at 09:14

## 2024-07-10 RX ADMIN — Medication 100 MILLIGRAM(S): at 09:09

## 2024-07-10 RX ADMIN — METFORMIN HYDROCHLORIDE 500 MILLIGRAM(S): 850 TABLET, FILM COATED ORAL at 19:57

## 2024-07-10 RX ADMIN — Medication 50 MICROGRAM(S): at 05:50

## 2024-07-10 RX ADMIN — Medication 10 MILLIGRAM(S): at 08:18

## 2024-07-10 RX ADMIN — LOSARTAN POTASSIUM 100 MILLIGRAM(S): 100 TABLET, FILM COATED ORAL at 08:18

## 2024-07-10 RX ADMIN — LABETALOL HYDROCHLORIDE 200 MILLIGRAM(S): 300 TABLET ORAL at 08:19

## 2024-07-10 RX ADMIN — Medication 10 MILLIGRAM(S): at 19:59

## 2024-07-10 RX ADMIN — AMLODIPINE BESYLATE 5 MILLIGRAM(S): 2.5 TABLET ORAL at 08:18

## 2024-07-11 ENCOUNTER — EMERGENCY (EMERGENCY)
Facility: HOSPITAL | Age: 59
LOS: 1 days | Discharge: OTHER INSTITUTION | End: 2024-07-11
Attending: STUDENT IN AN ORGANIZED HEALTH CARE EDUCATION/TRAINING PROGRAM | Admitting: STUDENT IN AN ORGANIZED HEALTH CARE EDUCATION/TRAINING PROGRAM
Payer: MEDICARE

## 2024-07-11 VITALS
RESPIRATION RATE: 16 BRPM | HEIGHT: 74 IN | OXYGEN SATURATION: 100 % | SYSTOLIC BLOOD PRESSURE: 143 MMHG | HEART RATE: 64 BPM | DIASTOLIC BLOOD PRESSURE: 78 MMHG | TEMPERATURE: 98 F

## 2024-07-11 DIAGNOSIS — J34.2 DEVIATED NASAL SEPTUM: Chronic | ICD-10-CM

## 2024-07-11 DIAGNOSIS — K08.199 COMPLETE LOSS OF TEETH DUE TO OTHER SPECIFIED CAUSE, UNSPECIFIED CLASS: Chronic | ICD-10-CM

## 2024-07-11 LAB
GLUCOSE BLDC GLUCOMTR-MCNC: 106 MG/DL — HIGH (ref 70–99)
GLUCOSE BLDC GLUCOMTR-MCNC: 159 MG/DL — HIGH (ref 70–99)
GLUCOSE BLDC GLUCOMTR-MCNC: 90 MG/DL — SIGNIFICANT CHANGE UP (ref 70–99)
GLUCOSE BLDC GLUCOMTR-MCNC: 95 MG/DL — SIGNIFICANT CHANGE UP (ref 70–99)

## 2024-07-11 PROCEDURE — 90832 PSYTX W PT 30 MINUTES: CPT | Mod: 59

## 2024-07-11 PROCEDURE — 99232 SBSQ HOSP IP/OBS MODERATE 35: CPT | Mod: 25

## 2024-07-11 PROCEDURE — 99284 EMERGENCY DEPT VISIT MOD MDM: CPT

## 2024-07-11 PROCEDURE — 90853 GROUP PSYCHOTHERAPY: CPT

## 2024-07-11 RX ORDER — ACETAMINOPHEN 325 MG
650 TABLET ORAL EVERY 6 HOURS
Refills: 0 | Status: DISCONTINUED | OUTPATIENT
Start: 2024-07-11 | End: 2024-07-12

## 2024-07-11 RX ADMIN — LABETALOL HYDROCHLORIDE 200 MILLIGRAM(S): 300 TABLET ORAL at 08:16

## 2024-07-11 RX ADMIN — LABETALOL HYDROCHLORIDE 200 MILLIGRAM(S): 300 TABLET ORAL at 19:53

## 2024-07-11 RX ADMIN — Medication 650 MILLIGRAM(S): at 18:48

## 2024-07-11 RX ADMIN — NICOTINE POLACRILEX 1 PATCH: 2 LOZENGE ORAL at 18:52

## 2024-07-11 RX ADMIN — NICOTINE POLACRILEX 1 PATCH: 2 LOZENGE ORAL at 08:15

## 2024-07-11 RX ADMIN — Medication 10 MILLIGRAM(S): at 19:53

## 2024-07-11 RX ADMIN — METFORMIN HYDROCHLORIDE 500 MILLIGRAM(S): 850 TABLET, FILM COATED ORAL at 08:16

## 2024-07-11 RX ADMIN — Medication 10 MILLIGRAM(S): at 08:16

## 2024-07-11 RX ADMIN — Medication 50 MICROGRAM(S): at 06:21

## 2024-07-11 RX ADMIN — AMLODIPINE BESYLATE 5 MILLIGRAM(S): 2.5 TABLET ORAL at 08:16

## 2024-07-11 RX ADMIN — LOSARTAN POTASSIUM 100 MILLIGRAM(S): 100 TABLET, FILM COATED ORAL at 08:18

## 2024-07-11 RX ADMIN — ATORVASTATIN CALCIUM 40 MILLIGRAM(S): 20 TABLET, FILM COATED ORAL at 19:53

## 2024-07-11 RX ADMIN — INSULIN LISPRO 1: 100 INJECTION, SOLUTION SUBCUTANEOUS at 17:10

## 2024-07-11 RX ADMIN — HYDROXYZINE PAMOATE 50 MILLIGRAM(S): 50 CAPSULE ORAL at 13:39

## 2024-07-11 RX ADMIN — METFORMIN HYDROCHLORIDE 500 MILLIGRAM(S): 850 TABLET, FILM COATED ORAL at 19:53

## 2024-07-11 NOTE — BH INPATIENT PSYCHIATRY PROGRESS NOTE - NSCGISEVERILLNESS_PSY_ALL_CORE
6 = Severely ill - disruptive pathology, behavior and function are frequently influenced by symptoms, may require assistance from others

## 2024-07-11 NOTE — BH INPATIENT PSYCHIATRY PROGRESS NOTE - NSBHMETABOLIC_PSY_ALL_CORE_FT
BMI: BMI (kg/m2): 32.6 (06-22-24 @ 09:27)  HbA1c: A1C with Estimated Average Glucose Result: 5.7 % (05-26-24 @ 06:00)    Glucose: POCT Blood Glucose.: 70 mg/dL (07-08-24 @ 11:05)    BP: --Vital Signs Last 24 Hrs  T(C): 36.4 (07-08-24 @ 06:18), Max: 36.5 (07-07-24 @ 19:34)  T(F): 97.6 (07-08-24 @ 06:18), Max: 97.7 (07-07-24 @ 19:34)  HR: --  BP: --  BP(mean): --  RR: 17 (07-08-24 @ 08:01) (17 - 17)  SpO2: --    Orthostatic VS  07-08-24 @ 06:18  Lying BP: --/-- HR: --  Sitting BP: 130/74 HR: 61  Standing BP: 125/66 HR: 69  Site: --  Mode: --  Orthostatic VS  07-07-24 @ 19:34  Lying BP: --/-- HR: --  Sitting BP: 146/83 HR: 77  Standing BP: 130/68 HR: 80  Site: --  Mode: electronic  Orthostatic VS  07-07-24 @ 06:18  Lying BP: --/-- HR: --  Sitting BP: 122/70 HR: 73  Standing BP: 134/69 HR: 66  Site: --  Mode: --  Orthostatic VS  07-06-24 @ 19:14  Lying BP: --/-- HR: --  Sitting BP: 125/66 HR: 60  Standing BP: 116/61 HR: 64  Site: --  Mode: --    Lipid Panel: Date/Time: 03-09-24 @ 09:43  Cholesterol, Serum: 187  LDL Cholesterol Calculated: 123  HDL Cholesterol, Serum: 37  Total Cholesterol/HDL Ration Measurement: --  Triglycerides, Serum: 135  
BMI: BMI (kg/m2): 32.6 (06-22-24 @ 09:27)  HbA1c: A1C with Estimated Average Glucose Result: 5.7 % (05-26-24 @ 06:00)    Glucose: POCT Blood Glucose.: 91 mg/dL (07-05-24 @ 11:38)    BP: --Vital Signs Last 24 Hrs  T(C): 37.2 (07-05-24 @ 07:51), Max: 37.2 (07-05-24 @ 07:51)  T(F): 98.9 (07-05-24 @ 07:51), Max: 98.9 (07-05-24 @ 07:51)  HR: --  BP: --  BP(mean): --  RR: 16 (07-05-24 @ 07:51) (16 - 16)  SpO2: --    Orthostatic VS  07-05-24 @ 07:51  Lying BP: --/-- HR: --  Sitting BP: 135/74 HR: 67  Standing BP: 138/69 HR: 74  Site: upper left arm  Mode: electronic  Orthostatic VS  07-04-24 @ 19:25  Lying BP: --/-- HR: --  Sitting BP: 149/92 HR: 109  Standing BP: 125/82 HR: 115  Site: --  Mode: --  Orthostatic VS  07-04-24 @ 08:03  Lying BP: --/-- HR: --  Sitting BP: 132/71 HR: 63  Standing BP: 123/75 HR: 71  Site: --  Mode: --  Orthostatic VS  07-03-24 @ 19:23  Lying BP: --/-- HR: --  Sitting BP: 142/70 HR: 67  Standing BP: 124/68 HR: 73  Site: --  Mode: --    Lipid Panel: Date/Time: 03-09-24 @ 09:43  Cholesterol, Serum: 187  LDL Cholesterol Calculated: 123  HDL Cholesterol, Serum: 37  Total Cholesterol/HDL Ration Measurement: --  Triglycerides, Serum: 135  
BMI: BMI (kg/m2): 32.6 (06-22-24 @ 09:27)  HbA1c: A1C with Estimated Average Glucose Result: 5.7 % (05-26-24 @ 06:00)    Glucose: POCT Blood Glucose.: 98 mg/dL (06-24-24 @ 11:35)    BP: 150/62 (06-22-24 @ 08:33) (140/64 - 162/76)Vital Signs Last 24 Hrs  T(C): 36.3 (06-24-24 @ 08:22), Max: 36.6 (06-23-24 @ 19:59)  T(F): 97.3 (06-24-24 @ 08:22), Max: 97.9 (06-23-24 @ 19:59)  HR: --  BP: --  BP(mean): --  RR: 16 (06-24-24 @ 08:22) (16 - 16)  SpO2: --    Orthostatic VS  06-24-24 @ 08:22  Lying BP: --/-- HR: --  Sitting BP: 134/65 HR: 69  Standing BP: 121/67 HR: 80  Site: --  Mode: --  Orthostatic VS  06-23-24 @ 19:59  Lying BP: --/-- HR: --  Sitting BP: 143/67 HR: 66  Standing BP: 135/60 HR: 70  Site: --  Mode: --  Orthostatic VS  06-23-24 @ 08:15  Lying BP: --/-- HR: --  Sitting BP: 128/69 HR: 59  Standing BP: 133/70 HR: 69  Site: --  Mode: --  Orthostatic VS  06-22-24 @ 19:34  Lying BP: --/-- HR: --  Sitting BP: 138/70 HR: 78  Standing BP: 118/63 HR: 82  Site: --  Mode: --    Lipid Panel: Date/Time: 03-09-24 @ 09:43  Cholesterol, Serum: 187  LDL Cholesterol Calculated: 123  HDL Cholesterol, Serum: 37  Total Cholesterol/HDL Ration Measurement: --  Triglycerides, Serum: 135  
BMI: BMI (kg/m2): 32.6 (06-22-24 @ 09:27)  HbA1c: A1C with Estimated Average Glucose Result: 5.7 % (05-26-24 @ 06:00)    Glucose: POCT Blood Glucose.: 95 mg/dL (06-25-24 @ 11:47)    BP: --Vital Signs Last 24 Hrs  T(C): 36.7 (06-25-24 @ 08:11), Max: 36.7 (06-25-24 @ 08:11)  T(F): 98.1 (06-25-24 @ 08:11), Max: 98.1 (06-25-24 @ 08:11)  HR: --  BP: --  BP(mean): --  RR: 16 (06-24-24 @ 19:29) (16 - 16)  SpO2: --    Orthostatic VS  06-25-24 @ 08:11  Lying BP: --/-- HR: --  Sitting BP: 134/74 HR: 62  Standing BP: 103/72 HR: 72  Site: upper left arm  Mode: electronic  Orthostatic VS  06-24-24 @ 19:29  Lying BP: --/-- HR: --  Sitting BP: 131/88 HR: 68  Standing BP: 143/68 HR: 76  Site: --  Mode: --  Orthostatic VS  06-24-24 @ 08:22  Lying BP: --/-- HR: --  Sitting BP: 134/65 HR: 69  Standing BP: 121/67 HR: 80  Site: --  Mode: --  Orthostatic VS  06-23-24 @ 19:59  Lying BP: --/-- HR: --  Sitting BP: 143/67 HR: 66  Standing BP: 135/60 HR: 70  Site: --  Mode: --    Lipid Panel: Date/Time: 03-09-24 @ 09:43  Cholesterol, Serum: 187  LDL Cholesterol Calculated: 123  HDL Cholesterol, Serum: 37  Total Cholesterol/HDL Ration Measurement: --  Triglycerides, Serum: 135  
BMI: BMI (kg/m2): 32.6 (06-22-24 @ 09:27)  HbA1c: A1C with Estimated Average Glucose Result: 5.7 % (05-26-24 @ 06:00)    Glucose: POCT Blood Glucose.: 91 mg/dL (06-27-24 @ 11:54)    BP: --Vital Signs Last 24 Hrs  T(C): 36.6 (06-27-24 @ 07:53), Max: 36.6 (06-27-24 @ 07:53)  T(F): 97.9 (06-27-24 @ 07:53), Max: 97.9 (06-27-24 @ 07:53)  HR: --  BP: --  BP(mean): --  RR: 17 (06-27-24 @ 07:53) (16 - 17)  SpO2: --    Orthostatic VS  06-27-24 @ 07:53  Lying BP: --/-- HR: --  Sitting BP: 124/79 HR: 63  Standing BP: 134/67 HR: 74  Site: --  Mode: --  Orthostatic VS  06-26-24 @ 08:50  Lying BP: --/-- HR: --  Sitting BP: 133/75 HR: 67  Standing BP: 120/73 HR: 77  Site: --  Mode: --  Orthostatic VS  06-26-24 @ 06:53  Lying BP: --/-- HR: --  Sitting BP: 131/73 HR: 61  Standing BP: 111/63 HR: 68  Site: upper left arm  Mode: electronic  Orthostatic VS  06-25-24 @ 19:17  Lying BP: --/-- HR: --  Sitting BP: 138/71 HR: 85  Standing BP: 128/87 HR: 102  Site: --  Mode: --    Lipid Panel: Date/Time: 03-09-24 @ 09:43  Cholesterol, Serum: 187  LDL Cholesterol Calculated: 123  HDL Cholesterol, Serum: 37  Total Cholesterol/HDL Ration Measurement: --  Triglycerides, Serum: 135  
BMI: BMI (kg/m2): 32.6 (06-22-24 @ 09:27)  HbA1c: A1C with Estimated Average Glucose Result: 5.7 % (05-26-24 @ 06:00)    Glucose: POCT Blood Glucose.: 120 mg/dL (07-01-24 @ 12:36)    BP: --Vital Signs Last 24 Hrs  T(C): 36.1 (07-01-24 @ 07:53), Max: 36.6 (06-30-24 @ 19:15)  T(F): 97 (07-01-24 @ 07:53), Max: 97.8 (06-30-24 @ 19:15)  HR: --  BP: --  BP(mean): --  RR: 17 (07-01-24 @ 07:53) (16 - 17)  SpO2: --    Orthostatic VS  07-01-24 @ 07:53  Lying BP: --/-- HR: --  Sitting BP: 141/71 HR: 68  Standing BP: 139/68 HR: 71  Site: upper left arm  Mode: electronic  Orthostatic VS  06-30-24 @ 19:15  Lying BP: --/-- HR: --  Sitting BP: 128/68 HR: 56  Standing BP: 126/74 HR: 60  Site: --  Mode: --  Orthostatic VS  06-30-24 @ 07:46  Lying BP: --/-- HR: --  Sitting BP: 130/79 HR: 66  Standing BP: 123/67 HR: 72  Site: --  Mode: --  Orthostatic VS  06-29-24 @ 19:42  Lying BP: --/-- HR: --  Sitting BP: 131/67 HR: 60  Standing BP: 114/60 HR: 64  Site: --  Mode: --    Lipid Panel: Date/Time: 03-09-24 @ 09:43  Cholesterol, Serum: 187  LDL Cholesterol Calculated: 123  HDL Cholesterol, Serum: 37  Total Cholesterol/HDL Ration Measurement: --  Triglycerides, Serum: 135  
BMI: BMI (kg/m2): 32.6 (06-22-24 @ 09:27)  HbA1c: A1C with Estimated Average Glucose Result: 5.7 % (05-26-24 @ 06:00)    Glucose: POCT Blood Glucose.: 77 mg/dL (06-30-24 @ 11:31)    BP: --Vital Signs Last 24 Hrs  T(C): 37 (06-30-24 @ 07:46), Max: 37 (06-30-24 @ 07:46)  T(F): 98.6 (06-30-24 @ 07:46), Max: 98.6 (06-30-24 @ 07:46)  HR: --  BP: --  BP(mean): --  RR: 16 (06-30-24 @ 07:46) (16 - 17)  SpO2: --    Orthostatic VS  06-30-24 @ 07:46  Lying BP: --/-- HR: --  Sitting BP: 130/79 HR: 66  Standing BP: 123/67 HR: 72  Site: --  Mode: --  Orthostatic VS  06-29-24 @ 19:42  Lying BP: --/-- HR: --  Sitting BP: 131/67 HR: 60  Standing BP: 114/60 HR: 64  Site: --  Mode: --  Orthostatic VS  06-29-24 @ 07:58  Lying BP: --/-- HR: --  Sitting BP: 144/75 HR: 60  Standing BP: 138/68 HR: 70  Site: upper left arm  Mode: electronic  Orthostatic VS  06-28-24 @ 19:34  Lying BP: --/-- HR: --  Sitting BP: 153/81 HR: 61  Standing BP: 133/79 HR: 66  Site: --  Mode: --    Lipid Panel: Date/Time: 03-09-24 @ 09:43  Cholesterol, Serum: 187  LDL Cholesterol Calculated: 123  HDL Cholesterol, Serum: 37  Total Cholesterol/HDL Ration Measurement: --  Triglycerides, Serum: 135  
BMI: BMI (kg/m2): 32.6 (06-22-24 @ 09:27)  HbA1c: A1C with Estimated Average Glucose Result: 5.7 % (05-26-24 @ 06:00)    Glucose: POCT Blood Glucose.: 97 mg/dL (07-03-24 @ 11:38)    BP: --Vital Signs Last 24 Hrs  T(C): 36.8 (07-03-24 @ 07:45), Max: 36.8 (07-03-24 @ 07:45)  T(F): 98.2 (07-03-24 @ 07:45), Max: 98.2 (07-03-24 @ 07:45)  HR: --  BP: --  BP(mean): --  RR: 17 (07-03-24 @ 07:45) (16 - 17)  SpO2: --    Orthostatic VS  07-03-24 @ 07:45  Lying BP: --/-- HR: --  Sitting BP: 127/82 HR: 64  Standing BP: 132/69 HR: 78  Site: --  Mode: electronic  Orthostatic VS  07-02-24 @ 19:33  Lying BP: --/-- HR: --  Sitting BP: 144/74 HR: 66  Standing BP: 132/69 HR: 73  Site: --  Mode: --  Orthostatic VS  07-02-24 @ 07:54  Lying BP: --/-- HR: --  Sitting BP: 123/70 HR: 65  Standing BP: 120/62 HR: 79  Site: --  Mode: --  Orthostatic VS  07-01-24 @ 19:29  Lying BP: --/-- HR: --  Sitting BP: 123/76 HR: 66  Standing BP: 117/67 HR: 68  Site: --  Mode: --    Lipid Panel: Date/Time: 03-09-24 @ 09:43  Cholesterol, Serum: 187  LDL Cholesterol Calculated: 123  HDL Cholesterol, Serum: 37  Total Cholesterol/HDL Ration Measurement: --  Triglycerides, Serum: 135  
BMI: BMI (kg/m2): 32.6 (06-22-24 @ 09:27)  HbA1c: A1C with Estimated Average Glucose Result: 5.7 % (05-26-24 @ 06:00)    Glucose: POCT Blood Glucose.: 120 mg/dL (07-01-24 @ 12:36)    BP: --Vital Signs Last 24 Hrs  T(C): 36.1 (07-01-24 @ 07:53), Max: 36.6 (06-30-24 @ 19:15)  T(F): 97 (07-01-24 @ 07:53), Max: 97.8 (06-30-24 @ 19:15)  HR: --  BP: --  BP(mean): --  RR: 17 (07-01-24 @ 07:53) (16 - 17)  SpO2: --    Orthostatic VS  07-01-24 @ 07:53  Lying BP: --/-- HR: --  Sitting BP: 141/71 HR: 68  Standing BP: 139/68 HR: 71  Site: upper left arm  Mode: electronic  Orthostatic VS  06-30-24 @ 19:15  Lying BP: --/-- HR: --  Sitting BP: 128/68 HR: 56  Standing BP: 126/74 HR: 60  Site: --  Mode: --  Orthostatic VS  06-30-24 @ 07:46  Lying BP: --/-- HR: --  Sitting BP: 130/79 HR: 66  Standing BP: 123/67 HR: 72  Site: --  Mode: --  Orthostatic VS  06-29-24 @ 19:42  Lying BP: --/-- HR: --  Sitting BP: 131/67 HR: 60  Standing BP: 114/60 HR: 64  Site: --  Mode: --    Lipid Panel: Date/Time: 03-09-24 @ 09:43  Cholesterol, Serum: 187  LDL Cholesterol Calculated: 123  HDL Cholesterol, Serum: 37  Total Cholesterol/HDL Ration Measurement: --  Triglycerides, Serum: 135  
BMI: BMI (kg/m2): 32.6 (06-22-24 @ 09:27)  HbA1c: A1C with Estimated Average Glucose Result: 5.7 % (05-26-24 @ 06:00)    Glucose: POCT Blood Glucose.: 88 mg/dL (07-10-24 @ 11:49)    BP: --Vital Signs Last 24 Hrs  T(C): 36.7 (07-10-24 @ 08:07), Max: 36.7 (07-10-24 @ 08:07)  T(F): 98.1 (07-10-24 @ 08:07), Max: 98.1 (07-10-24 @ 08:07)  HR: --  BP: --  BP(mean): --  RR: 16 (07-09-24 @ 20:44) (16 - 16)  SpO2: --    Orthostatic VS  07-10-24 @ 08:07  Lying BP: --/-- HR: --  Sitting BP: 124/67 HR: 63  Standing BP: 114/67 HR: 75  Site: --  Mode: electronic  Orthostatic VS  07-09-24 @ 19:06  Lying BP: --/-- HR: --  Sitting BP: 145/76 HR: 70  Standing BP: 128/77 HR: 78  Site: --  Mode: --  Orthostatic VS  07-09-24 @ 08:40  Lying BP: --/-- HR: --  Sitting BP: 132/65 HR: 66  Standing BP: 119/67 HR: 75  Site: --  Mode: --  Orthostatic VS  07-08-24 @ 19:18  Lying BP: --/-- HR: --  Sitting BP: 148/69 HR: 62  Standing BP: 125/61 HR: 67  Site: --  Mode: --    Lipid Panel: Date/Time: 03-09-24 @ 09:43  Cholesterol, Serum: 187  LDL Cholesterol Calculated: 123  HDL Cholesterol, Serum: 37  Total Cholesterol/HDL Ration Measurement: --  Triglycerides, Serum: 135  
BMI: BMI (kg/m2): 32.6 (06-22-24 @ 09:27)  HbA1c: A1C with Estimated Average Glucose Result: 5.7 % (05-26-24 @ 06:00)    Glucose: POCT Blood Glucose.: 93 mg/dL (07-09-24 @ 11:58)    BP: --Vital Signs Last 24 Hrs  T(C): 36.6 (07-09-24 @ 08:40), Max: 36.7 (07-08-24 @ 19:18)  T(F): 97.9 (07-09-24 @ 08:40), Max: 98 (07-08-24 @ 19:18)  HR: --  BP: --  BP(mean): --  RR: 16 (07-09-24 @ 08:40) (16 - 16)  SpO2: --    Orthostatic VS  07-09-24 @ 08:40  Lying BP: --/-- HR: --  Sitting BP: 132/65 HR: 66  Standing BP: 119/67 HR: 75  Site: --  Mode: --  Orthostatic VS  07-08-24 @ 19:18  Lying BP: --/-- HR: --  Sitting BP: 148/69 HR: 62  Standing BP: 125/61 HR: 67  Site: --  Mode: --  Orthostatic VS  07-08-24 @ 06:18  Lying BP: --/-- HR: --  Sitting BP: 130/74 HR: 61  Standing BP: 125/66 HR: 69  Site: --  Mode: --  Orthostatic VS  07-07-24 @ 19:34  Lying BP: --/-- HR: --  Sitting BP: 146/83 HR: 77  Standing BP: 130/68 HR: 80  Site: --  Mode: electronic    Lipid Panel: Date/Time: 03-09-24 @ 09:43  Cholesterol, Serum: 187  LDL Cholesterol Calculated: 123  HDL Cholesterol, Serum: 37  Total Cholesterol/HDL Ration Measurement: --  Triglycerides, Serum: 135  
BMI: BMI (kg/m2): 32.6 (06-22-24 @ 09:27)  HbA1c: A1C with Estimated Average Glucose Result: 5.7 % (05-26-24 @ 06:00)    Glucose: POCT Blood Glucose.: 90 mg/dL (07-11-24 @ 11:36)    BP: --Vital Signs Last 24 Hrs  T(C): 36.8 (07-11-24 @ 07:55), Max: 36.8 (07-10-24 @ 18:51)  T(F): 98.2 (07-11-24 @ 07:55), Max: 98.3 (07-10-24 @ 18:51)  HR: --  BP: --  BP(mean): --  RR: 16 (07-10-24 @ 20:57) (16 - 16)  SpO2: --    Orthostatic VS  07-11-24 @ 07:55  Lying BP: --/-- HR: --  Sitting BP: 120/71 HR: 64  Standing BP: 108/60 HR: 71  Site: --  Mode: electronic  Orthostatic VS  07-10-24 @ 18:51  Lying BP: --/-- HR: --  Sitting BP: 128/61 HR: 59  Standing BP: 115/65 HR: 68  Site: --  Mode: electronic  Orthostatic VS  07-10-24 @ 08:07  Lying BP: --/-- HR: --  Sitting BP: 124/67 HR: 63  Standing BP: 114/67 HR: 75  Site: --  Mode: electronic  Orthostatic VS  07-09-24 @ 19:06  Lying BP: --/-- HR: --  Sitting BP: 145/76 HR: 70  Standing BP: 128/77 HR: 78  Site: --  Mode: --    Lipid Panel: Date/Time: 03-09-24 @ 09:43  Cholesterol, Serum: 187  LDL Cholesterol Calculated: 123  HDL Cholesterol, Serum: 37  Total Cholesterol/HDL Ration Measurement: --  Triglycerides, Serum: 135  
BMI: BMI (kg/m2): 32.6 (06-22-24 @ 09:27)  HbA1c: A1C with Estimated Average Glucose Result: 5.7 % (05-26-24 @ 06:00)    Glucose: POCT Blood Glucose.: 101 mg/dL (06-28-24 @ 11:49)    BP: --Vital Signs Last 24 Hrs  T(C): 35.7 (06-28-24 @ 07:48), Max: 36.6 (06-27-24 @ 19:28)  T(F): 96.3 (06-28-24 @ 07:48), Max: 97.9 (06-27-24 @ 19:28)  HR: --  BP: --  BP(mean): --  RR: 16 (06-27-24 @ 22:29) (16 - 17)  SpO2: --    Orthostatic VS  06-28-24 @ 07:48  Lying BP: --/-- HR: --  Sitting BP: 136/67 HR: 67  Standing BP: 119/67 HR: 62  Site: --  Mode: --  Orthostatic VS  06-27-24 @ 22:29  Lying BP: --/-- HR: --  Sitting BP: 113/66 HR: 60  Standing BP: 113/64 HR: 67  Site: --  Mode: --  Orthostatic VS  06-27-24 @ 19:28  Lying BP: --/-- HR: --  Sitting BP: 173/85 HR: 65  Standing BP: 151/87 HR: 71  Site: --  Mode: --  Orthostatic VS  06-27-24 @ 07:53  Lying BP: --/-- HR: --  Sitting BP: 124/79 HR: 63  Standing BP: 134/67 HR: 74  Site: --  Mode: --    Lipid Panel: Date/Time: 03-09-24 @ 09:43  Cholesterol, Serum: 187  LDL Cholesterol Calculated: 123  HDL Cholesterol, Serum: 37  Total Cholesterol/HDL Ration Measurement: --  Triglycerides, Serum: 135  
BMI: BMI (kg/m2): 32.6 (06-22-24 @ 09:27)  HbA1c: A1C with Estimated Average Glucose Result: 5.7 % (05-26-24 @ 06:00)    Glucose: POCT Blood Glucose.: 95 mg/dL (06-25-24 @ 11:47)    BP: --Vital Signs Last 24 Hrs  T(C): 36.7 (06-25-24 @ 08:11), Max: 36.7 (06-25-24 @ 08:11)  T(F): 98.1 (06-25-24 @ 08:11), Max: 98.1 (06-25-24 @ 08:11)  HR: --  BP: --  BP(mean): --  RR: 16 (06-24-24 @ 19:29) (16 - 16)  SpO2: --    Orthostatic VS  06-25-24 @ 08:11  Lying BP: --/-- HR: --  Sitting BP: 134/74 HR: 62  Standing BP: 103/72 HR: 72  Site: upper left arm  Mode: electronic  Orthostatic VS  06-24-24 @ 19:29  Lying BP: --/-- HR: --  Sitting BP: 131/88 HR: 68  Standing BP: 143/68 HR: 76  Site: --  Mode: --  Orthostatic VS  06-24-24 @ 08:22  Lying BP: --/-- HR: --  Sitting BP: 134/65 HR: 69  Standing BP: 121/67 HR: 80  Site: --  Mode: --  Orthostatic VS  06-23-24 @ 19:59  Lying BP: --/-- HR: --  Sitting BP: 143/67 HR: 66  Standing BP: 135/60 HR: 70  Site: --  Mode: --    Lipid Panel: Date/Time: 03-09-24 @ 09:43  Cholesterol, Serum: 187  LDL Cholesterol Calculated: 123  HDL Cholesterol, Serum: 37  Total Cholesterol/HDL Ration Measurement: --  Triglycerides, Serum: 135

## 2024-07-11 NOTE — BH PSYCHOLOGY - GROUP THERAPY NOTE - NSBHPSYCHOLPARTICIPCOMMENT_PSY_A_CORE FT
Patient was actively engaged in the group discussion
Pt participated independently  
Pt participated independently  
Patient was actively engaged in the group discussion
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
## 2024-07-11 NOTE — BH PSYCHOLOGY - GROUP THERAPY NOTE - NSPSYCHOLGRPCOGINT_PSY_A_CORE
group members provided support/group members suggested positive behaviors/other..
group members provided support/group members suggested positive behaviors/mindfulness skills taught/relaxation skills practiced/other..
group members provided support/group members suggested positive behaviors/other..
group members provided support/group members suggested positive behaviors/mindfulness skills taught/relaxation skills practiced/other..

## 2024-07-11 NOTE — BH INPATIENT PSYCHIATRY PROGRESS NOTE - NSBHCONSBHPROVCNTCTNOFT_PSY_A_CORE
defer to primary team

## 2024-07-11 NOTE — BH INPATIENT PSYCHIATRY PROGRESS NOTE - NSBHMSEPERCEPT_PSY_A_CORE
No abnormalities/Other
No abnormalities/Other
Auditory hallucinations/Other
No abnormalities/Other
Auditory hallucinations/Other
Auditory hallucinations/Other
No abnormalities/Other
Auditory hallucinations/Other
Auditory hallucinations/Other
No abnormalities/Other

## 2024-07-11 NOTE — BH PSYCHOLOGY - GROUP THERAPY NOTE - NSPSYCHOLGRPCOGGOAL_PSY_A_CORE FT
Decrease symptoms, Develop coping/emotion regulation skills, Psychoeducation 

## 2024-07-11 NOTE — BH INPATIENT PSYCHIATRY PROGRESS NOTE - NSBHCONSDANGERSELF_PSY_A_CORE
suicidal behavior/suicidal ideation with plan and means

## 2024-07-11 NOTE — BH INPATIENT PSYCHIATRY PROGRESS NOTE - NSBHATTESTTYPEVISIT_PSY_A_CORE
Attending Only
ANA without on-site Attending supervision
Attending Only
ANA without on-site Attending supervision
Attending Only
ANA without on-site Attending supervision

## 2024-07-11 NOTE — BH PSYCHOLOGY - CLINICIAN PSYCHOTHERAPY NOTE - TOKEN PULL-DIAGNOSIS
Primary Diagnosis:  Schizoaffective disorder [F25.9]        Problem Dx:   Tobacco use disorder [F17.200]      Schizoaffective disorder, unspecified type [F25.9]

## 2024-07-11 NOTE — BH INPATIENT PSYCHIATRY PROGRESS NOTE - NSTXDCSOCDATEEST_PSY_ALL_CORE
24-Jun-2024
24-Jun-2024
02-Jul-2024
24-Jun-2024
09-Jul-2024
24-Jun-2024
24-Jun-2024
02-Jul-2024
24-Jun-2024
24-Jun-2024
02-Jul-2024
09-Jul-2024
24-Jun-2024
02-Jul-2024
09-Jul-2024

## 2024-07-11 NOTE — BH INPATIENT PSYCHIATRY PROGRESS NOTE - CURRENT MEDICATION
MEDICATIONS  (STANDING):  amLODIPine   Tablet 5 milliGRAM(s) Oral daily  atorvastatin 40 milliGRAM(s) Oral at bedtime  glucagon  Injectable 1 milliGRAM(s) IntraMuscular once  haloperidol     Tablet 10 milliGRAM(s) Oral two times a day  insulin lispro (ADMELOG) corrective regimen sliding scale   SubCutaneous three times a day before meals  labetalol 200 milliGRAM(s) Oral two times a day  levothyroxine 50 MICROGram(s) Oral daily  losartan 100 milliGRAM(s) Oral daily  metFORMIN 500 milliGRAM(s) Oral two times a day  nicotine - 21 mG/24Hr(s) Patch 1 Patch Transdermal daily    MEDICATIONS  (PRN):  dextrose Oral Gel 15 Gram(s) Oral once PRN Blood Glucose LESS THAN 70 milliGRAM(s)/deciliter  haloperidol     Tablet 5 milliGRAM(s) Oral every 6 hours PRN agitation  haloperidol    Injectable 5 milliGRAM(s) IntraMuscular Once PRN agitation  hydrOXYzine hydrochloride 50 milliGRAM(s) Oral every 6 hours PRN anxiety  polyethylene glycol 3350 17 Gram(s) Oral daily PRN constipation  
MEDICATIONS  (STANDING):  amLODIPine   Tablet 5 milliGRAM(s) Oral daily  atorvastatin 40 milliGRAM(s) Oral at bedtime  glucagon  Injectable 1 milliGRAM(s) IntraMuscular once  haloperidol     Tablet 10 milliGRAM(s) Oral two times a day  insulin lispro (ADMELOG) corrective regimen sliding scale   SubCutaneous three times a day before meals  labetalol 200 milliGRAM(s) Oral two times a day  levothyroxine 50 MICROGram(s) Oral daily  losartan 100 milliGRAM(s) Oral daily  metFORMIN 500 milliGRAM(s) Oral two times a day  nicotine - 21 mG/24Hr(s) Patch 1 Patch Transdermal daily    MEDICATIONS  (PRN):  dextrose Oral Gel 15 Gram(s) Oral once PRN Blood Glucose LESS THAN 70 milliGRAM(s)/deciliter  haloperidol     Tablet 5 milliGRAM(s) Oral every 6 hours PRN agitation  haloperidol    Injectable 5 milliGRAM(s) IntraMuscular Once PRN agitation  hydrOXYzine hydrochloride 25 milliGRAM(s) Oral every 6 hours PRN Anxiety  LORazepam     Tablet 2 milliGRAM(s) Oral every 6 hours PRN Agitation  LORazepam   Injectable 2 milliGRAM(s) IntraMuscular Once PRN Agitation  polyethylene glycol 3350 17 Gram(s) Oral daily PRN constipation  
MEDICATIONS  (STANDING):  amLODIPine   Tablet 5 milliGRAM(s) Oral daily  atorvastatin 40 milliGRAM(s) Oral at bedtime  glucagon  Injectable 1 milliGRAM(s) IntraMuscular once  haloperidol     Tablet 10 milliGRAM(s) Oral two times a day  insulin lispro (ADMELOG) corrective regimen sliding scale   SubCutaneous three times a day before meals  labetalol 200 milliGRAM(s) Oral two times a day  levothyroxine 50 MICROGram(s) Oral daily  losartan 100 milliGRAM(s) Oral daily  metFORMIN 500 milliGRAM(s) Oral two times a day  nicotine - 21 mG/24Hr(s) Patch 1 Patch Transdermal daily    MEDICATIONS  (PRN):  dextrose Oral Gel 15 Gram(s) Oral once PRN Blood Glucose LESS THAN 70 milliGRAM(s)/deciliter  haloperidol     Tablet 5 milliGRAM(s) Oral every 6 hours PRN agitation  haloperidol    Injectable 5 milliGRAM(s) IntraMuscular Once PRN agitation  hydrOXYzine hydrochloride 50 milliGRAM(s) Oral every 6 hours PRN anxiety  polyethylene glycol 3350 17 Gram(s) Oral daily PRN constipation  
MEDICATIONS  (STANDING):  amLODIPine   Tablet 5 milliGRAM(s) Oral daily  atorvastatin 40 milliGRAM(s) Oral at bedtime  glucagon  Injectable 1 milliGRAM(s) IntraMuscular once  haloperidol     Tablet 5 milliGRAM(s) Oral daily  haloperidol     Tablet 7 milliGRAM(s) Oral at bedtime  insulin lispro (ADMELOG) corrective regimen sliding scale   SubCutaneous three times a day before meals  labetalol 200 milliGRAM(s) Oral two times a day  levothyroxine 50 MICROGram(s) Oral daily  losartan 100 milliGRAM(s) Oral daily  metFORMIN 500 milliGRAM(s) Oral two times a day  nicotine - 21 mG/24Hr(s) Patch 1 Patch Transdermal daily    MEDICATIONS  (PRN):  dextrose Oral Gel 15 Gram(s) Oral once PRN Blood Glucose LESS THAN 70 milliGRAM(s)/deciliter  haloperidol     Tablet 5 milliGRAM(s) Oral every 6 hours PRN agitation  haloperidol    Injectable 5 milliGRAM(s) IntraMuscular Once PRN agitation  LORazepam     Tablet 2 milliGRAM(s) Oral every 6 hours PRN Agitation  LORazepam   Injectable 2 milliGRAM(s) IntraMuscular Once PRN Agitation  
MEDICATIONS  (STANDING):  amLODIPine   Tablet 5 milliGRAM(s) Oral daily  atorvastatin 40 milliGRAM(s) Oral at bedtime  glucagon  Injectable 1 milliGRAM(s) IntraMuscular once  haloperidol     Tablet 7 milliGRAM(s) Oral at bedtime  haloperidol     Tablet 5 milliGRAM(s) Oral daily  insulin lispro (ADMELOG) corrective regimen sliding scale   SubCutaneous three times a day before meals  labetalol 200 milliGRAM(s) Oral two times a day  levothyroxine 50 MICROGram(s) Oral daily  losartan 100 milliGRAM(s) Oral daily  metFORMIN 500 milliGRAM(s) Oral two times a day  nicotine - 21 mG/24Hr(s) Patch 1 Patch Transdermal daily    MEDICATIONS  (PRN):  dextrose Oral Gel 15 Gram(s) Oral once PRN Blood Glucose LESS THAN 70 milliGRAM(s)/deciliter  haloperidol     Tablet 5 milliGRAM(s) Oral every 6 hours PRN agitation  haloperidol    Injectable 5 milliGRAM(s) IntraMuscular Once PRN agitation  LORazepam     Tablet 2 milliGRAM(s) Oral every 6 hours PRN Agitation  LORazepam   Injectable 2 milliGRAM(s) IntraMuscular Once PRN Agitation  
MEDICATIONS  (STANDING):  amLODIPine   Tablet 5 milliGRAM(s) Oral daily  atorvastatin 40 milliGRAM(s) Oral at bedtime  glucagon  Injectable 1 milliGRAM(s) IntraMuscular once  haloperidol     Tablet 10 milliGRAM(s) Oral at bedtime  haloperidol     Tablet 5 milliGRAM(s) Oral daily  insulin lispro (ADMELOG) corrective regimen sliding scale   SubCutaneous three times a day before meals  labetalol 200 milliGRAM(s) Oral two times a day  levothyroxine 50 MICROGram(s) Oral daily  losartan 100 milliGRAM(s) Oral daily  metFORMIN 500 milliGRAM(s) Oral two times a day  nicotine - 21 mG/24Hr(s) Patch 1 Patch Transdermal daily    MEDICATIONS  (PRN):  dextrose Oral Gel 15 Gram(s) Oral once PRN Blood Glucose LESS THAN 70 milliGRAM(s)/deciliter  haloperidol     Tablet 5 milliGRAM(s) Oral every 6 hours PRN agitation  haloperidol    Injectable 5 milliGRAM(s) IntraMuscular Once PRN agitation  hydrOXYzine hydrochloride 25 milliGRAM(s) Oral every 6 hours PRN Anxiety  LORazepam     Tablet 2 milliGRAM(s) Oral every 6 hours PRN Agitation  LORazepam   Injectable 2 milliGRAM(s) IntraMuscular Once PRN Agitation  
MEDICATIONS  (STANDING):  amLODIPine   Tablet 5 milliGRAM(s) Oral daily  atorvastatin 40 milliGRAM(s) Oral at bedtime  glucagon  Injectable 1 milliGRAM(s) IntraMuscular once  haloperidol     Tablet 5 milliGRAM(s) Oral daily  haloperidol     Tablet 7 milliGRAM(s) Oral at bedtime  insulin lispro (ADMELOG) corrective regimen sliding scale   SubCutaneous three times a day before meals  labetalol 200 milliGRAM(s) Oral two times a day  levothyroxine 50 MICROGram(s) Oral daily  losartan 100 milliGRAM(s) Oral daily  metFORMIN 500 milliGRAM(s) Oral two times a day  nicotine - 21 mG/24Hr(s) Patch 1 Patch Transdermal daily    MEDICATIONS  (PRN):  dextrose Oral Gel 15 Gram(s) Oral once PRN Blood Glucose LESS THAN 70 milliGRAM(s)/deciliter  haloperidol     Tablet 5 milliGRAM(s) Oral every 6 hours PRN agitation  haloperidol    Injectable 5 milliGRAM(s) IntraMuscular Once PRN agitation  LORazepam     Tablet 2 milliGRAM(s) Oral every 6 hours PRN Agitation  LORazepam   Injectable 2 milliGRAM(s) IntraMuscular Once PRN Agitation  
MEDICATIONS  (STANDING):  amLODIPine   Tablet 5 milliGRAM(s) Oral daily  atorvastatin 40 milliGRAM(s) Oral at bedtime  glucagon  Injectable 1 milliGRAM(s) IntraMuscular once  haloperidol     Tablet 10 milliGRAM(s) Oral at bedtime  haloperidol     Tablet 5 milliGRAM(s) Oral daily  insulin lispro (ADMELOG) corrective regimen sliding scale   SubCutaneous three times a day before meals  labetalol 200 milliGRAM(s) Oral two times a day  levothyroxine 50 MICROGram(s) Oral daily  losartan 100 milliGRAM(s) Oral daily  metFORMIN 500 milliGRAM(s) Oral two times a day  nicotine - 21 mG/24Hr(s) Patch 1 Patch Transdermal daily    MEDICATIONS  (PRN):  dextrose Oral Gel 15 Gram(s) Oral once PRN Blood Glucose LESS THAN 70 milliGRAM(s)/deciliter  haloperidol     Tablet 5 milliGRAM(s) Oral every 6 hours PRN agitation  haloperidol    Injectable 5 milliGRAM(s) IntraMuscular Once PRN agitation  hydrOXYzine hydrochloride 25 milliGRAM(s) Oral every 6 hours PRN Anxiety  LORazepam     Tablet 2 milliGRAM(s) Oral every 6 hours PRN Agitation  LORazepam   Injectable 2 milliGRAM(s) IntraMuscular Once PRN Agitation  
MEDICATIONS  (STANDING):  amLODIPine   Tablet 5 milliGRAM(s) Oral daily  atorvastatin 40 milliGRAM(s) Oral at bedtime  glucagon  Injectable 1 milliGRAM(s) IntraMuscular once  haloperidol     Tablet 5 milliGRAM(s) Oral daily  haloperidol     Tablet 10 milliGRAM(s) Oral at bedtime  insulin lispro (ADMELOG) corrective regimen sliding scale   SubCutaneous three times a day before meals  labetalol 200 milliGRAM(s) Oral two times a day  levothyroxine 50 MICROGram(s) Oral daily  losartan 100 milliGRAM(s) Oral daily  metFORMIN 500 milliGRAM(s) Oral two times a day  nicotine - 21 mG/24Hr(s) Patch 1 Patch Transdermal daily    MEDICATIONS  (PRN):  dextrose Oral Gel 15 Gram(s) Oral once PRN Blood Glucose LESS THAN 70 milliGRAM(s)/deciliter  haloperidol     Tablet 5 milliGRAM(s) Oral every 6 hours PRN agitation  haloperidol    Injectable 5 milliGRAM(s) IntraMuscular Once PRN agitation  hydrOXYzine hydrochloride 25 milliGRAM(s) Oral every 6 hours PRN Anxiety  LORazepam     Tablet 2 milliGRAM(s) Oral every 6 hours PRN Agitation  LORazepam   Injectable 2 milliGRAM(s) IntraMuscular Once PRN Agitation  polyethylene glycol 3350 17 Gram(s) Oral daily PRN constipation  
MEDICATIONS  (STANDING):  amLODIPine   Tablet 5 milliGRAM(s) Oral daily  atorvastatin 40 milliGRAM(s) Oral at bedtime  glucagon  Injectable 1 milliGRAM(s) IntraMuscular once  haloperidol     Tablet 5 milliGRAM(s) Oral daily  haloperidol     Tablet 10 milliGRAM(s) Oral at bedtime  insulin lispro (ADMELOG) corrective regimen sliding scale   SubCutaneous three times a day before meals  labetalol 200 milliGRAM(s) Oral two times a day  levothyroxine 50 MICROGram(s) Oral daily  losartan 100 milliGRAM(s) Oral daily  metFORMIN 500 milliGRAM(s) Oral two times a day  nicotine - 21 mG/24Hr(s) Patch 1 Patch Transdermal daily    MEDICATIONS  (PRN):  dextrose Oral Gel 15 Gram(s) Oral once PRN Blood Glucose LESS THAN 70 milliGRAM(s)/deciliter  haloperidol     Tablet 5 milliGRAM(s) Oral every 6 hours PRN agitation  haloperidol    Injectable 5 milliGRAM(s) IntraMuscular Once PRN agitation  hydrOXYzine hydrochloride 25 milliGRAM(s) Oral every 6 hours PRN Anxiety  LORazepam     Tablet 2 milliGRAM(s) Oral every 6 hours PRN Agitation  LORazepam   Injectable 2 milliGRAM(s) IntraMuscular Once PRN Agitation  
MEDICATIONS  (STANDING):  amLODIPine   Tablet 5 milliGRAM(s) Oral daily  atorvastatin 40 milliGRAM(s) Oral at bedtime  glucagon  Injectable 1 milliGRAM(s) IntraMuscular once  haloperidol     Tablet 5 milliGRAM(s) Oral daily  haloperidol     Tablet 7 milliGRAM(s) Oral at bedtime  insulin lispro (ADMELOG) corrective regimen sliding scale   SubCutaneous three times a day before meals  labetalol 200 milliGRAM(s) Oral two times a day  levothyroxine 50 MICROGram(s) Oral daily  losartan 100 milliGRAM(s) Oral daily  metFORMIN 500 milliGRAM(s) Oral two times a day  nicotine - 21 mG/24Hr(s) Patch 1 Patch Transdermal daily    MEDICATIONS  (PRN):  dextrose Oral Gel 15 Gram(s) Oral once PRN Blood Glucose LESS THAN 70 milliGRAM(s)/deciliter  haloperidol     Tablet 5 milliGRAM(s) Oral every 6 hours PRN agitation  haloperidol    Injectable 5 milliGRAM(s) IntraMuscular Once PRN agitation  LORazepam     Tablet 2 milliGRAM(s) Oral every 6 hours PRN Agitation  LORazepam   Injectable 2 milliGRAM(s) IntraMuscular Once PRN Agitation  
MEDICATIONS  (STANDING):  amLODIPine   Tablet 5 milliGRAM(s) Oral daily  atorvastatin 40 milliGRAM(s) Oral at bedtime  glucagon  Injectable 1 milliGRAM(s) IntraMuscular once  haloperidol     Tablet 10 milliGRAM(s) Oral two times a day  insulin lispro (ADMELOG) corrective regimen sliding scale   SubCutaneous three times a day before meals  labetalol 200 milliGRAM(s) Oral two times a day  levothyroxine 50 MICROGram(s) Oral daily  losartan 100 milliGRAM(s) Oral daily  metFORMIN 500 milliGRAM(s) Oral two times a day  nicotine - 21 mG/24Hr(s) Patch 1 Patch Transdermal daily    MEDICATIONS  (PRN):  dextrose Oral Gel 15 Gram(s) Oral once PRN Blood Glucose LESS THAN 70 milliGRAM(s)/deciliter  haloperidol     Tablet 5 milliGRAM(s) Oral every 6 hours PRN agitation  haloperidol    Injectable 5 milliGRAM(s) IntraMuscular Once PRN agitation  hydrOXYzine hydrochloride 50 milliGRAM(s) Oral every 6 hours PRN anxiety  polyethylene glycol 3350 17 Gram(s) Oral daily PRN constipation  
MEDICATIONS  (STANDING):  amLODIPine   Tablet 5 milliGRAM(s) Oral daily  atorvastatin 40 milliGRAM(s) Oral at bedtime  glucagon  Injectable 1 milliGRAM(s) IntraMuscular once  haloperidol     Tablet 10 milliGRAM(s) Oral two times a day  insulin lispro (ADMELOG) corrective regimen sliding scale   SubCutaneous three times a day before meals  labetalol 200 milliGRAM(s) Oral two times a day  levothyroxine 50 MICROGram(s) Oral daily  losartan 100 milliGRAM(s) Oral daily  metFORMIN 500 milliGRAM(s) Oral two times a day  nicotine - 21 mG/24Hr(s) Patch 1 Patch Transdermal daily    MEDICATIONS  (PRN):  dextrose Oral Gel 15 Gram(s) Oral once PRN Blood Glucose LESS THAN 70 milliGRAM(s)/deciliter  haloperidol     Tablet 5 milliGRAM(s) Oral every 6 hours PRN agitation  haloperidol    Injectable 5 milliGRAM(s) IntraMuscular Once PRN agitation  hydrOXYzine hydrochloride 25 milliGRAM(s) Oral every 6 hours PRN Anxiety  LORazepam     Tablet 2 milliGRAM(s) Oral every 6 hours PRN Agitation  LORazepam   Injectable 2 milliGRAM(s) IntraMuscular Once PRN Agitation  polyethylene glycol 3350 17 Gram(s) Oral daily PRN constipation  
MEDICATIONS  (STANDING):  amLODIPine   Tablet 5 milliGRAM(s) Oral daily  atorvastatin 40 milliGRAM(s) Oral at bedtime  glucagon  Injectable 1 milliGRAM(s) IntraMuscular once  haloperidol     Tablet 7 milliGRAM(s) Oral at bedtime  haloperidol     Tablet 5 milliGRAM(s) Oral daily  insulin lispro (ADMELOG) corrective regimen sliding scale   SubCutaneous three times a day before meals  labetalol 200 milliGRAM(s) Oral two times a day  levothyroxine 50 MICROGram(s) Oral daily  losartan 100 milliGRAM(s) Oral daily  metFORMIN 500 milliGRAM(s) Oral two times a day  nicotine - 21 mG/24Hr(s) Patch 1 Patch Transdermal daily    MEDICATIONS  (PRN):  dextrose Oral Gel 15 Gram(s) Oral once PRN Blood Glucose LESS THAN 70 milliGRAM(s)/deciliter  haloperidol     Tablet 5 milliGRAM(s) Oral every 6 hours PRN agitation  haloperidol    Injectable 5 milliGRAM(s) IntraMuscular Once PRN agitation  hydrOXYzine hydrochloride 25 milliGRAM(s) Oral every 6 hours PRN Anxiety  LORazepam     Tablet 2 milliGRAM(s) Oral every 6 hours PRN Agitation  LORazepam   Injectable 2 milliGRAM(s) IntraMuscular Once PRN Agitation  
MEDICATIONS  (STANDING):  amLODIPine   Tablet 5 milliGRAM(s) Oral daily  atorvastatin 40 milliGRAM(s) Oral at bedtime  glucagon  Injectable 1 milliGRAM(s) IntraMuscular once  haloperidol     Tablet 10 milliGRAM(s) Oral two times a day  insulin lispro (ADMELOG) corrective regimen sliding scale   SubCutaneous three times a day before meals  labetalol 200 milliGRAM(s) Oral two times a day  levothyroxine 50 MICROGram(s) Oral daily  losartan 100 milliGRAM(s) Oral daily  metFORMIN 500 milliGRAM(s) Oral two times a day  nicotine - 21 mG/24Hr(s) Patch 1 Patch Transdermal daily    MEDICATIONS  (PRN):  dextrose Oral Gel 15 Gram(s) Oral once PRN Blood Glucose LESS THAN 70 milliGRAM(s)/deciliter  haloperidol     Tablet 5 milliGRAM(s) Oral every 6 hours PRN agitation  haloperidol    Injectable 5 milliGRAM(s) IntraMuscular Once PRN agitation  hydrOXYzine hydrochloride 50 milliGRAM(s) Oral every 6 hours PRN anxiety  polyethylene glycol 3350 17 Gram(s) Oral daily PRN constipation

## 2024-07-11 NOTE — BH INPATIENT PSYCHIATRY PROGRESS NOTE - NSICDXBHSECONDARYDX_PSY_ALL_CORE
Tobacco use disorder   F17.200  

## 2024-07-11 NOTE — BH INPATIENT PSYCHIATRY PROGRESS NOTE - NSTXSUICIDGOAL_PSY_ALL_CORE
Will identify and utilize 2 coping skills
Will identify and utilize 2 coping skills
Talk to staff and ask for assistance when having suicidal wishes instead of acting out
Will identify and utilize 2 coping skills
Talk to staff and ask for assistance when having suicidal wishes instead of acting out

## 2024-07-11 NOTE — BH INPATIENT PSYCHIATRY PROGRESS NOTE - NSTXDCSOCGOAL_PSY_ALL_CORE
Other...
Will accept referrals to support groups, clubhouse, senior centers, etc.
Other...
Will accept referrals to support groups, clubhouse, senior centers, etc.
Other...
Will accept referrals to support groups, clubhouse, senior centers, etc.
Other...

## 2024-07-11 NOTE — BH INPATIENT PSYCHIATRY PROGRESS NOTE - NSBHMSESPEECH_PSY_A_CORE
Normal volume, rate, productivity, spontaneity and articulation

## 2024-07-11 NOTE — BH INPATIENT PSYCHIATRY PROGRESS NOTE - NSTXSUICIDDATEEST_PSY_ALL_CORE
26-Jun-2024
26-Jun-2024
23-Jun-2024
22-Jun-2024
26-Jun-2024
23-Jun-2024
23-Jun-2024
26-Jun-2024
23-Jun-2024
23-Jun-2024
22-Jun-2024
23-Jun-2024
23-Jun-2024
26-Jun-2024
23-Jun-2024

## 2024-07-11 NOTE — BH INPATIENT PSYCHIATRY PROGRESS NOTE - NSTXDCSOCPROGRES_PSY_ALL_CORE
Improving
No Change
No Change
Improving
No Change
Improving
No Change
Improving
Improving
No Change

## 2024-07-11 NOTE — BH INPATIENT PSYCHIATRY PROGRESS NOTE - NS ED [facility] MED ROS PSYCHIATRIC
Received a call from patient stating that he would like to proceed with an ablation instead of a partial nephrectomy. He denies any other questions at this time.     Will make Dr. Rene aware.   
See HPI

## 2024-07-11 NOTE — BH PSYCHOLOGY - GROUP THERAPY NOTE - NSPSYCHOLGRPCOGPT_PSY_A_CORE
participated in exercise/shared positive coping experience/other...
other...
participated in exercise/shared positive coping experience/other...
other...

## 2024-07-11 NOTE — BH PSYCHOLOGY - GROUP THERAPY NOTE - NSPSYCHOLGRPCOGPROB_PSY_A_CORE FT
Anxiety, Depression, Emotion dysregulation, Lack of coping skills 
Anxiety, Depression, Emotion dysregulation, Lack of coping skills 
Depression, Emotion dysregulation, Lack of coping skills, Psycho-social stressors, Self care, Problem solving 
Depression, Emotion dysregulation, Lack of coping skills, Psycho-social stressors, Self care, Problem solving

## 2024-07-11 NOTE — BH INPATIENT PSYCHIATRY PROGRESS NOTE - NSBHATTESTBILLING_PSY_A_CORE
89710-Brctnoqjui OBS or IP - moderate complexity OR 35-49 mins
[id number]-Oinklcmnmp OBS or IP - moderate complexity OR 35-49 mins
[id number]-Gbqaagtlbn OBS or IP - moderate complexity OR 35-49 mins
[id number]-Kezvzkqdxh OBS or IP - moderate complexity OR 35-49 mins
[id number]-Thlxnxvavn OBS or IP - moderate complexity OR 35-49 mins
[id number]-Irbeknyfea OBS or IP - moderate complexity OR 35-49 mins
[id number]-Lfqpfhpeub OBS or IP - moderate complexity OR 35-49 mins
[id number]-Wozsgslweo OBS or IP - moderate complexity OR 35-49 mins
[id number]-Xlvutvnccp OBS or IP - moderate complexity OR 35-49 mins
[id number]-Sshclewzpj OBS or IP - moderate complexity OR 35-49 mins
69089-Simlqnsomb OBS or IP - moderate complexity OR 35-49 mins
[id number]-Rurmdzlylu OBS or IP - moderate complexity OR 35-49 mins
[id number]-Nkkvgljcee OBS or IP - moderate complexity OR 35-49 mins
[id number]-Avetzccjhk OBS or IP - moderate complexity OR 35-49 mins
[id number]-Uatwuourph OBS or IP - moderate complexity OR 35-49 mins

## 2024-07-11 NOTE — BH INPATIENT PSYCHIATRY PROGRESS NOTE - NSBHASSESSSUMMFT_PSY_ALL_CORE
Patient is a 58 y o male, domiciled in supportive housing [place], on SSD, single, unemployed, w/ PMHx HTN, DM2 (last HBa1c from 5/2024 was 5.7), hypogammaglobulinemia (for which he receives monthly infusions; reports last was yesterday), w/ PPHx schizoaffective d/o, hx of multiple inpatient psychiatric hospitalizations (last known in 2024 at [facility] due to hyponatremia on medical clearance labs), follows outpatient at [facility] w/ Dr Cook (last visit on 6/19/24; on Abioz BELLA, reports next due 7/1/24),  denies hx SA/NSSIB (however per chart review one collateral note cites sister reporting remote SA), denies drug or alcohol use, w/ exception of tobacco, denies legal hx, self presented to the ED for suicidal ideations.   On  initially evaluation, patient presents with auditory hallucinations, paranoid persecutory delusions, with suicidal ideation and plan if discharged back home, hopelessness/guilt/worthlessness and is s/p aborted suicide attempt in the community prior to arrival to the ED.  At this time, patient is unable to contract for safety in the community and is requesting and meets criteria for voluntary inpatient psychiatric admission for safety, stabilization and medication optimization (agreeable to further Haldol increase). Patient reports they would be able to maintain safety from suicidal standpoint while on inpatient unit and if this changed they would tell nursing staff and denies active HI at this time, thus no 1:1 indicated at this time.   Patient initially noted to be with Na of 130, which as per primary team appears to be his baseline.  As per primary team, no nephrology consult indicated nor salt tabs and recommended fluid restriction of 1.5L/ day once on inpatient unit. Repeat was requested to ensure not downtrending and follow up Na was 135; as per primary team, patient is medically cleared and no further intervention indicated at this time.     On the unit, patient reported SA by OD, denies any SI or depressive sx currently. Reports AH/delusions that are much improved inside hospital and exacerbated at this residence.    Recommendations:   -admit on 9.13 legal status  -hx of hyponatremia on initial lab draw / polydipsia as per chart review: patient's repeat Na WNL and as per primary team nothing to do other than fluid restriction of 1.5L/day on unit; f/u Na 131 on 6/23, repeat Na 133 on 6/28  -titrate Haldol to 10 mg PO qAM and 10mg PO qhs (increased to 5mg BID on 6/19 as per outpatient note) - transition to BELLA   -confirm patient's last Abilify BELLA in face due 7/1 and that last IVIG given q monthly (reports last given on 6/20)  -HTN: continue amLODIPine 5 mg oral tablet: 1 tab(s) orally once a day, labetalol 200 mg oral tablet: 1 tab(s) orally 2 times a day, losartan 100 mg oral tablet: 1 tab(s) orally once a day  -HLD: continue atorvastatin 40 mg oral tablet: 1 tab(s) orally once a day (at bedtime)  -Hypothyroid: continue levothyroxine 50 mcg (0.05 mg) oral tablet: 1 tab(s) orally once a day  -Tobacco Use Disorder: NRT  -DM: continue metFORMIN 500 mg oral tablet: 1 tab(s) orally 2 times a day and insulin sliding scale  -For agitation please attempt verbal de-escalation and behavioral intervention first. If patient does not respond to above, may give haldol 5 mg po q6h prn, ativan 2 mg po q6h prn if no contraindications. If patient is refusing PO, remains an imminent danger to self or others and If Patient's  qtc <500, can escalate to IM formulation. If IM antipsychotic is administered, please perform follow-up ECG for QTc monitoring.  
Patient is a 58 y o male, domiciled in supportive housing [place], on SSD, single, unemployed, w/ PMHx HTN, DM2 (last HBa1c from 5/2024 was 5.7), hypogammaglobulinemia (for which he receives monthly infusions; reports last was yesterday), w/ PPHx schizoaffective d/o, hx of multiple inpatient psychiatric hospitalizations (last known in 2024 at [facility] due to hyponatremia on medical clearance labs), follows outpatient at [facility] w/ Dr Cook (last visit on 6/19/24; on Abioz BELLA, reports next due 7/1/24),  denies hx SA/NSSIB (however per chart review one collateral note cites sister reporting remote SA), denies drug or alcohol use, w/ exception of tobacco, denies legal hx, self presented to the ED for suicidal ideations.   On  initially evaluation, patient presents with auditory hallucinations, paranoid persecutory delusions, with suicidal ideation and plan if discharged back home, hopelessness/guilt/worthlessness and is s/p aborted suicide attempt in the community prior to arrival to the ED.  At this time, patient is unable to contract for safety in the community and is requesting and meets criteria for voluntary inpatient psychiatric admission for safety, stabilization and medication optimization (agreeable to further Haldol increase). Patient reports they would be able to maintain safety from suicidal standpoint while on inpatient unit and if this changed they would tell nursing staff and denies active HI at this time, thus no 1:1 indicated at this time.   Patient initially noted to be with Na of 130, which as per primary team appears to be his baseline.  As per primary team, no nephrology consult indicated nor salt tabs and recommended fluid restriction of 1.5L/ day once on inpatient unit. Repeat was requested to ensure not downtrending and follow up Na was 135; as per primary team, patient is medically cleared and no further intervention indicated at this time.     On the unit, patient reported SA by OD, denies any SI or depressive sx currently. Reports AH/delusions that are much improved inside hospital and exacerbated at this residence.    Recommendations:   -admit on 9.13 legal status  -hx of hyponatremia on initial lab draw / polydipsia as per chart review: patient's repeat Na WNL and as per primary team nothing to do other than fluid restriction of 1.5L/day on unit; f/u Na 131 on 6/23, repeat on 6/28  -titrate Haldol to 5mg PO qAM and 7mg PO qhs (increased to 5mg BID on 6/19 as per outpatient note)  -confirm patient's last Abilify BELLA in face due 7/1 and that last IVIG given q monthly (reports last given on 6/20)  -HTN: continue amLODIPine 5 mg oral tablet: 1 tab(s) orally once a day, labetalol 200 mg oral tablet: 1 tab(s) orally 2 times a day, losartan 100 mg oral tablet: 1 tab(s) orally once a day  -HLD: continue atorvastatin 40 mg oral tablet: 1 tab(s) orally once a day (at bedtime)  -Hypothyroid: continue levothyroxine 50 mcg (0.05 mg) oral tablet: 1 tab(s) orally once a day  -Tobacco Use Disorder: NRT  -DM: continue metFORMIN 500 mg oral tablet: 1 tab(s) orally 2 times a day and insulin sliding scale  -For agitation please attempt verbal de-escalation and behavioral intervention first. If patient does not respond to above, may give haldol 5 mg po q6h prn, ativan 2 mg po q6h prn if no contraindications. If patient is refusing PO, remains an imminent danger to self or others and If Patient's  qtc <500, can escalate to IM formulation. If IM antipsychotic is administered, please perform follow-up ECG for QTc monitoring.  
Patient is a 58 y o male, domiciled in supportive housing [place], on SSD, single, unemployed, w/ PMHx HTN, DM2 (last HBa1c from 5/2024 was 5.7), hypogammaglobulinemia (for which he receives monthly infusions; reports last was yesterday), w/ PPHx schizoaffective d/o, hx of multiple inpatient psychiatric hospitalizations (last known in 2024 at [facility] due to hyponatremia on medical clearance labs), follows outpatient at [facility] w/ Dr Cook (last visit on 6/19/24; on Abioz BELLA, reports next due 7/1/24),  denies hx SA/NSSIB (however per chart review one collateral note cites sister reporting remote SA), denies drug or alcohol use, w/ exception of tobacco, denies legal hx, self presented to the ED for suicidal ideations.   On  initially evaluation, patient presents with auditory hallucinations, paranoid persecutory delusions, with suicidal ideation and plan if discharged back home, hopelessness/guilt/worthlessness and is s/p aborted suicide attempt in the community prior to arrival to the ED.  At this time, patient is unable to contract for safety in the community and is requesting and meets criteria for voluntary inpatient psychiatric admission for safety, stabilization and medication optimization (agreeable to further Haldol increase). Patient reports they would be able to maintain safety from suicidal standpoint while on inpatient unit and if this changed they would tell nursing staff and denies active HI at this time, thus no 1:1 indicated at this time.   Patient initially noted to be with Na of 130, which as per primary team appears to be his baseline.  As per primary team, no nephrology consult indicated nor salt tabs and recommended fluid restriction of 1.5L/ day once on inpatient unit. Repeat was requested to ensure not downtrending and follow up Na was 135; as per primary team, patient is medically cleared and no further intervention indicated at this time.     On the unit, patient reported SA by OD, denies any SI or depressive sx currently. Reports AH/delusions that are much improved inside hospital and exacerbated at this residence.    Recommendations:   -admit on 9.13 legal status  -hx of hyponatremia on initial lab draw / polydipsia as per chart review: patient's repeat Na WNL and as per primary team nothing to do other than fluid restriction of 1.5L/day on unit; f/u Na 131 on 6/23, repeat in next days   -titrate Haldol to 5mg PO qAM and 7mg PO qhs (increased to 5mg BID on 6/19 as per outpatient note)  -confirm patient's last Abilify BELLA in face due 7/1 and that last IVIG given q monthly (reports last given on 6/20)  -HTN: continue amLODIPine 5 mg oral tablet: 1 tab(s) orally once a day, labetalol 200 mg oral tablet: 1 tab(s) orally 2 times a day, losartan 100 mg oral tablet: 1 tab(s) orally once a day  -HLD: continue atorvastatin 40 mg oral tablet: 1 tab(s) orally once a day (at bedtime)  -Hypothyroid: continue levothyroxine 50 mcg (0.05 mg) oral tablet: 1 tab(s) orally once a day  -Tobacco Use Disorder: NRT  -DM: continue metFORMIN 500 mg oral tablet: 1 tab(s) orally 2 times a day and insulin sliding scale  -For agitation please attempt verbal de-escalation and behavioral intervention first. If patient does not respond to above, may give haldol 5 mg po q6h prn, ativan 2 mg po q6h prn if no contraindications. If patient is refusing PO, remains an imminent danger to self or others and If Patient's  qtc <500, can escalate to IM formulation. If IM antipsychotic is administered, please perform follow-up ECG for QTc monitoring.  
Patient is a 58 y o male, domiciled in supportive housing [place], on SSD, single, unemployed, w/ PMHx HTN, DM2 (last HBa1c from 5/2024 was 5.7), hypogammaglobulinemia (for which he receives monthly infusions; reports last was yesterday), w/ PPHx schizoaffective d/o, hx of multiple inpatient psychiatric hospitalizations (last known in 2024 at [facility] due to hyponatremia on medical clearance labs), follows outpatient at [facility] w/ Dr Cook (last visit on 6/19/24; on Abioz BELLA, reports next due 7/1/24),  denies hx SA/NSSIB (however per chart review one collateral note cites sister reporting remote SA), denies drug or alcohol use, w/ exception of tobacco, denies legal hx, self presented to the ED for suicidal ideations.   On  initially evaluation, patient presents with auditory hallucinations, paranoid persecutory delusions, with suicidal ideation and plan if discharged back home, hopelessness/guilt/worthlessness and is s/p aborted suicide attempt in the community prior to arrival to the ED.  At this time, patient is unable to contract for safety in the community and is requesting and meets criteria for voluntary inpatient psychiatric admission for safety, stabilization and medication optimization (agreeable to further Haldol increase). Patient reports they would be able to maintain safety from suicidal standpoint while on inpatient unit and if this changed they would tell nursing staff and denies active HI at this time, thus no 1:1 indicated at this time.   Patient initially noted to be with Na of 130, which as per primary team appears to be his baseline.  As per primary team, no nephrology consult indicated nor salt tabs and recommended fluid restriction of 1.5L/ day once on inpatient unit. Repeat was requested to ensure not downtrending and follow up Na was 135; as per primary team, patient is medically cleared and no further intervention indicated at this time.     On the unit, patient reported SA by OD, denies any SI or depressive sx currently. Reports AH/delusions that are much improved inside hospital and exacerbated at this residence.    Recommendations:   -admit on 9.13 legal status  -hx of hyponatremia on initial lab draw / polydipsia as per chart review: patient's repeat Na WNL and as per primary team nothing to do other than fluid restriction of 1.5L/day on unit; f/u Na 131 on 6/23, repeat Na 133 on 6/28  -titrate Haldol to 5mg PO qAM and 10mg PO qhs (increased to 5mg BID on 6/19 as per outpatient note)  -confirm patient's last Abilify BELLA in face due 7/1 and that last IVIG given q monthly (reports last given on 6/20)  -HTN: continue amLODIPine 5 mg oral tablet: 1 tab(s) orally once a day, labetalol 200 mg oral tablet: 1 tab(s) orally 2 times a day, losartan 100 mg oral tablet: 1 tab(s) orally once a day  -HLD: continue atorvastatin 40 mg oral tablet: 1 tab(s) orally once a day (at bedtime)  -Hypothyroid: continue levothyroxine 50 mcg (0.05 mg) oral tablet: 1 tab(s) orally once a day  -Tobacco Use Disorder: NRT  -DM: continue metFORMIN 500 mg oral tablet: 1 tab(s) orally 2 times a day and insulin sliding scale  -For agitation please attempt verbal de-escalation and behavioral intervention first. If patient does not respond to above, may give haldol 5 mg po q6h prn, ativan 2 mg po q6h prn if no contraindications. If patient is refusing PO, remains an imminent danger to self or others and If Patient's  qtc <500, can escalate to IM formulation. If IM antipsychotic is administered, please perform follow-up ECG for QTc monitoring.  
Patient is a 58 y o male, domiciled in supportive housing [place], on SSD, single, unemployed, w/ PMHx HTN, DM2 (last HBa1c from 5/2024 was 5.7), hypogammaglobulinemia (for which he receives monthly infusions; reports last was yesterday), w/ PPHx schizoaffective d/o, hx of multiple inpatient psychiatric hospitalizations (last known in 2024 at [facility] due to hyponatremia on medical clearance labs), follows outpatient at [facility] w/ Dr Cook (last visit on 6/19/24; on Abioz BELLA, reports next due 7/1/24),  denies hx SA/NSSIB (however per chart review one collateral note cites sister reporting remote SA), denies drug or alcohol use, w/ exception of tobacco, denies legal hx, self presented to the ED for suicidal ideations.   On  initially evaluation, patient presents with auditory hallucinations, paranoid persecutory delusions, with suicidal ideation and plan if discharged back home, hopelessness/guilt/worthlessness and is s/p aborted suicide attempt in the community prior to arrival to the ED.  At this time, patient is unable to contract for safety in the community and is requesting and meets criteria for voluntary inpatient psychiatric admission for safety, stabilization and medication optimization (agreeable to further Haldol increase). Patient reports they would be able to maintain safety from suicidal standpoint while on inpatient unit and if this changed they would tell nursing staff and denies active HI at this time, thus no 1:1 indicated at this time.   Patient initially noted to be with Na of 130, which as per primary team appears to be his baseline.  As per primary team, no nephrology consult indicated nor salt tabs and recommended fluid restriction of 1.5L/ day once on inpatient unit. Repeat was requested to ensure not downtrending and follow up Na was 135; as per primary team, patient is medically cleared and no further intervention indicated at this time.     On the unit, patient reported SA by OD, denies any SI or depressive sx currently. Reports AH/delusions that are much improved inside hospital and exacerbated at this residence.    Recommendations:   -admit on 9.13 legal status  -hx of hyponatremia on initial lab draw / polydipsia as per chart review: patient's repeat Na WNL and as per primary team nothing to do other than fluid restriction of 1.5L/day on unit; f/u Na 131 on 6/23, repeat Na 133 on 6/28  -titrate Haldol to 5mg PO qAM and 10mg PO qhs (increased to 5mg BID on 6/19 as per outpatient note)  -confirm patient's last Abilify BELLA in face due 7/1 and that last IVIG given q monthly (reports last given on 6/20)  -HTN: continue amLODIPine 5 mg oral tablet: 1 tab(s) orally once a day, labetalol 200 mg oral tablet: 1 tab(s) orally 2 times a day, losartan 100 mg oral tablet: 1 tab(s) orally once a day  -HLD: continue atorvastatin 40 mg oral tablet: 1 tab(s) orally once a day (at bedtime)  -Hypothyroid: continue levothyroxine 50 mcg (0.05 mg) oral tablet: 1 tab(s) orally once a day  -Tobacco Use Disorder: NRT  -DM: continue metFORMIN 500 mg oral tablet: 1 tab(s) orally 2 times a day and insulin sliding scale  -For agitation please attempt verbal de-escalation and behavioral intervention first. If patient does not respond to above, may give haldol 5 mg po q6h prn, ativan 2 mg po q6h prn if no contraindications. If patient is refusing PO, remains an imminent danger to self or others and If Patient's  qtc <500, can escalate to IM formulation. If IM antipsychotic is administered, please perform follow-up ECG for QTc monitoring.  
Patient is a 58 y o male, domiciled in supportive housing [place], on SSD, single, unemployed, w/ PMHx HTN, DM2 (last HBa1c from 5/2024 was 5.7), hypogammaglobulinemia (for which he receives monthly infusions; reports last was yesterday), w/ PPHx schizoaffective d/o, hx of multiple inpatient psychiatric hospitalizations (last known in 2024 at [facility] due to hyponatremia on medical clearance labs), follows outpatient at [facility] w/ Dr Cook (last visit on 6/19/24; on Abioz BELLA, reports next due 7/1/24),  denies hx SA/NSSIB (however per chart review one collateral note cites sister reporting remote SA), denies drug or alcohol use, w/ exception of tobacco, denies legal hx, self presented to the ED for suicidal ideations.   On  initially evaluation, patient presents with auditory hallucinations, paranoid persecutory delusions, with suicidal ideation and plan if discharged back home, hopelessness/guilt/worthlessness and is s/p aborted suicide attempt in the community prior to arrival to the ED.  At this time, patient is unable to contract for safety in the community and is requesting and meets criteria for voluntary inpatient psychiatric admission for safety, stabilization and medication optimization (agreeable to further Haldol increase). Patient reports they would be able to maintain safety from suicidal standpoint while on inpatient unit and if this changed they would tell nursing staff and denies active HI at this time, thus no 1:1 indicated at this time.   Patient initially noted to be with Na of 130, which as per primary team appears to be his baseline.  As per primary team, no nephrology consult indicated nor salt tabs and recommended fluid restriction of 1.5L/ day once on inpatient unit. Repeat was requested to ensure not downtrending and follow up Na was 135; as per primary team, patient is medically cleared and no further intervention indicated at this time.     On the unit, patient reported SA by OD, denies any SI or depressive sx currently. Reports AH/delusions that are much improved inside hospital and exacerbated at this residence.    Recommendations:   -admit on 9.13 legal status  -hx of hyponatremia on initial lab draw / polydipsia as per chart review: patient's repeat Na WNL and as per primary team nothing to do other than fluid restriction of 1.5L/day on unit; f/u Na 131 on 6/23, repeat Na 133 on 6/28  -titrate Haldol to 10 mg PO qAM and 10mg PO qhs (increased to 5mg BID on 6/19 as per outpatient note) - given Haldol Decanoate 100 mg IM on 7/5 and next loading dose 100 mg on 7/10. Will receive maintenance dose of Haldol Decanoate 200 mg IM on approximately 8/8, with 4 weeks of oral supplementation.   -confirm patient's last Abilify BELLA in face due 7/1 and that last IVIG given q monthly (reports last given on 6/20)  -HTN: continue amLODIPine 5 mg oral tablet: 1 tab(s) orally once a day, labetalol 200 mg oral tablet: 1 tab(s) orally 2 times a day, losartan 100 mg oral tablet: 1 tab(s) orally once a day  -HLD: continue atorvastatin 40 mg oral tablet: 1 tab(s) orally once a day (at bedtime)  -Hypothyroid: continue levothyroxine 50 mcg (0.05 mg) oral tablet: 1 tab(s) orally once a day  -Tobacco Use Disorder: NRT  -DM: continue metFORMIN 500 mg oral tablet: 1 tab(s) orally 2 times a day and insulin sliding scale  -For agitation please attempt verbal de-escalation and behavioral intervention first. If patient does not respond to above, may give haldol 5 mg po q6h prn, ativan 2 mg po q6h prn if no contraindications. If patient is refusing PO, remains an imminent danger to self or others and If Patient's  qtc <500, can escalate to IM formulation. If IM antipsychotic is administered, please perform follow-up ECG for QTc monitoring.  
Patient is a 58 y o male, domiciled in supportive housing [place], on SSD, single, unemployed, w/ PMHx HTN, DM2 (last HBa1c from 5/2024 was 5.7), hypogammaglobulinemia (for which he receives monthly infusions; reports last was yesterday), w/ PPHx schizoaffective d/o, hx of multiple inpatient psychiatric hospitalizations (last known in 2024 at [facility] due to hyponatremia on medical clearance labs), follows outpatient at [facility] w/ Dr Cook (last visit on 6/19/24; on Abioz BELLA, reports next due 7/1/24),  denies hx SA/NSSIB (however per chart review one collateral note cites sister reporting remote SA), denies drug or alcohol use, w/ exception of tobacco, denies legal hx, self presented to the ED for suicidal ideations.   On  initially evaluation, patient presents with auditory hallucinations, paranoid persecutory delusions, with suicidal ideation and plan if discharged back home, hopelessness/guilt/worthlessness and is s/p aborted suicide attempt in the community prior to arrival to the ED.  At this time, patient is unable to contract for safety in the community and is requesting and meets criteria for voluntary inpatient psychiatric admission for safety, stabilization and medication optimization (agreeable to further Haldol increase). Patient reports they would be able to maintain safety from suicidal standpoint while on inpatient unit and if this changed they would tell nursing staff and denies active HI at this time, thus no 1:1 indicated at this time.   Patient initially noted to be with Na of 130, which as per primary team appears to be his baseline.  As per primary team, no nephrology consult indicated nor salt tabs and recommended fluid restriction of 1.5L/ day once on inpatient unit. Repeat was requested to ensure not downtrending and follow up Na was 135; as per primary team, patient is medically cleared and no further intervention indicated at this time.     On the unit, patient reported SA by OD, denies any SI or depressive sx currently. Reports AH/delusions that are much improved inside hospital and exacerbated at this residence.    Recommendations:   -admit on 9.13 legal status  -hx of hyponatremia on initial lab draw / polydipsia as per chart review: patient's repeat Na WNL and as per primary team nothing to do other than fluid restriction of 1.5L/day on unit; f/u Na 131 on 6/23, repeat on 6/26  -titrate Haldol to 5mg PO qAM and 7mg PO qhs (increased to 5mg BID on 6/19 as per outpatient note)  -confirm patient's last Abilify BELLA in face due 7/1 and that last IVIG given q monthly (reports last given on 6/20)  -HTN: continue amLODIPine 5 mg oral tablet: 1 tab(s) orally once a day, labetalol 200 mg oral tablet: 1 tab(s) orally 2 times a day, losartan 100 mg oral tablet: 1 tab(s) orally once a day  -HLD: continue atorvastatin 40 mg oral tablet: 1 tab(s) orally once a day (at bedtime)  -Hypothyroid: continue levothyroxine 50 mcg (0.05 mg) oral tablet: 1 tab(s) orally once a day  -Tobacco Use Disorder: NRT  -DM: continue metFORMIN 500 mg oral tablet: 1 tab(s) orally 2 times a day and insulin sliding scale  -For agitation please attempt verbal de-escalation and behavioral intervention first. If patient does not respond to above, may give haldol 5 mg po q6h prn, ativan 2 mg po q6h prn if no contraindications. If patient is refusing PO, remains an imminent danger to self or others and If Patient's  qtc <500, can escalate to IM formulation. If IM antipsychotic is administered, please perform follow-up ECG for QTc monitoring.  
Patient is a 58 y o male, domiciled in supportive housing [place], on SSD, single, unemployed, w/ PMHx HTN, DM2 (last HBa1c from 5/2024 was 5.7), hypogammaglobulinemia (for which he receives monthly infusions; reports last was yesterday), w/ PPHx schizoaffective d/o, hx of multiple inpatient psychiatric hospitalizations (last known in 2024 at [facility] due to hyponatremia on medical clearance labs), follows outpatient at [facility] w/ Dr Cook (last visit on 6/19/24; on Abioz BELLA, reports next due 7/1/24),  denies hx SA/NSSIB (however per chart review one collateral note cites sister reporting remote SA), denies drug or alcohol use, w/ exception of tobacco, denies legal hx, self presented to the ED for suicidal ideations.   On  initially evaluation, patient presents with auditory hallucinations, paranoid persecutory delusions, with suicidal ideation and plan if discharged back home, hopelessness/guilt/worthlessness and is s/p aborted suicide attempt in the community prior to arrival to the ED.  At this time, patient is unable to contract for safety in the community and is requesting and meets criteria for voluntary inpatient psychiatric admission for safety, stabilization and medication optimization (agreeable to further Haldol increase). Patient reports they would be able to maintain safety from suicidal standpoint while on inpatient unit and if this changed they would tell nursing staff and denies active HI at this time, thus no 1:1 indicated at this time.   Patient initially noted to be with Na of 130, which as per primary team appears to be his baseline.  As per primary team, no nephrology consult indicated nor salt tabs and recommended fluid restriction of 1.5L/ day once on inpatient unit. Repeat was requested to ensure not downtrending and follow up Na was 135; as per primary team, patient is medically cleared and no further intervention indicated at this time.     On the unit, patient reported SA by OD, denies any SI or depressive sx currently. Reports AH/delusions that are much improved inside hospital and exacerbated at this residence.    Recommendations:   -admit on 9.13 legal status  -hx of hyponatremia on initial lab draw / polydipsia as per chart review: patient's repeat Na WNL and as per primary team nothing to do other than fluid restriction of 1.5L/day on unit; f/u Na 131 on 6/23, repeat Na 133 on 6/28  -titrate Haldol to 10 mg PO qAM and 10mg PO qhs (increased to 5mg BID on 6/19 as per outpatient note) - transition to BELLA   -confirm patient's last Abilify BELLA in face due 7/1 and that last IVIG given q monthly (reports last given on 6/20)  -HTN: continue amLODIPine 5 mg oral tablet: 1 tab(s) orally once a day, labetalol 200 mg oral tablet: 1 tab(s) orally 2 times a day, losartan 100 mg oral tablet: 1 tab(s) orally once a day  -HLD: continue atorvastatin 40 mg oral tablet: 1 tab(s) orally once a day (at bedtime)  -Hypothyroid: continue levothyroxine 50 mcg (0.05 mg) oral tablet: 1 tab(s) orally once a day  -Tobacco Use Disorder: NRT  -DM: continue metFORMIN 500 mg oral tablet: 1 tab(s) orally 2 times a day and insulin sliding scale  -For agitation please attempt verbal de-escalation and behavioral intervention first. If patient does not respond to above, may give haldol 5 mg po q6h prn, ativan 2 mg po q6h prn if no contraindications. If patient is refusing PO, remains an imminent danger to self or others and If Patient's  qtc <500, can escalate to IM formulation. If IM antipsychotic is administered, please perform follow-up ECG for QTc monitoring.  
Patient is a 58 y o male, domiciled in supportive housing [place], on SSD, single, unemployed, w/ PMHx HTN, DM2 (last HBa1c from 5/2024 was 5.7), hypogammaglobulinemia (for which he receives monthly infusions; reports last was yesterday), w/ PPHx schizoaffective d/o, hx of multiple inpatient psychiatric hospitalizations (last known in 2024 at [facility] due to hyponatremia on medical clearance labs), follows outpatient at [facility] w/ Dr Cook (last visit on 6/19/24; on Abioz BELLA, reports next due 7/1/24),  denies hx SA/NSSIB (however per chart review one collateral note cites sister reporting remote SA), denies drug or alcohol use, w/ exception of tobacco, denies legal hx, self presented to the ED for suicidal ideations.   On  initially evaluation, patient presents with auditory hallucinations, paranoid persecutory delusions, with suicidal ideation and plan if discharged back home, hopelessness/guilt/worthlessness and is s/p aborted suicide attempt in the community prior to arrival to the ED.  At this time, patient is unable to contract for safety in the community and is requesting and meets criteria for voluntary inpatient psychiatric admission for safety, stabilization and medication optimization (agreeable to further Haldol increase). Patient reports they would be able to maintain safety from suicidal standpoint while on inpatient unit and if this changed they would tell nursing staff and denies active HI at this time, thus no 1:1 indicated at this time.   Patient initially noted to be with Na of 130, which as per primary team appears to be his baseline.  As per primary team, no nephrology consult indicated nor salt tabs and recommended fluid restriction of 1.5L/ day once on inpatient unit. Repeat was requested to ensure not downtrending and follow up Na was 135; as per primary team, patient is medically cleared and no further intervention indicated at this time.     On the unit, patient reported SA by OD, denies any SI or depressive sx currently. Reports AH/delusions that are much improved inside hospital and exacerbated at this residence.    Recommendations:   -admit on 9.13 legal status  -hx of hyponatremia on initial lab draw / polydipsia as per chart review: patient's repeat Na WNL and as per primary team nothing to do other than fluid restriction of 1.5L/day on unit; f/u Na 131 on 6/23, repeat Na 133 on 6/28  -titrate Haldol to 10 mg PO qAM and 10mg PO qhs (increased to 5mg BID on 6/19 as per outpatient note) - given Haldol Decanoate 100 mg IM on 7/5 and next loading dose 100 mg on 7/10. Will receive maintenance dose of Haldol Decanoate 200 mg IM on approximately 8/8, with 4 weeks of oral supplementation.   -confirm patient's last Abilify BELLA in face due 7/1 and that last IVIG given q monthly (reports last given on 6/20)  -HTN: continue amLODIPine 5 mg oral tablet: 1 tab(s) orally once a day, labetalol 200 mg oral tablet: 1 tab(s) orally 2 times a day, losartan 100 mg oral tablet: 1 tab(s) orally once a day  -HLD: continue atorvastatin 40 mg oral tablet: 1 tab(s) orally once a day (at bedtime)  -Hypothyroid: continue levothyroxine 50 mcg (0.05 mg) oral tablet: 1 tab(s) orally once a day  -Tobacco Use Disorder: NRT  -DM: continue metFORMIN 500 mg oral tablet: 1 tab(s) orally 2 times a day and insulin sliding scale  -For agitation please attempt verbal de-escalation and behavioral intervention first. If patient does not respond to above, may give haldol 5 mg po q6h prn, ativan 2 mg po q6h prn if no contraindications. If patient is refusing PO, remains an imminent danger to self or others and If Patient's  qtc <500, can escalate to IM formulation. If IM antipsychotic is administered, please perform follow-up ECG for QTc monitoring.  
Patient is a 58 y o male, domiciled in supportive housing [place], on SSD, single, unemployed, w/ PMHx HTN, DM2 (last HBa1c from 5/2024 was 5.7), hypogammaglobulinemia (for which he receives monthly infusions; reports last was yesterday), w/ PPHx schizoaffective d/o, hx of multiple inpatient psychiatric hospitalizations (last known in 2024 at [facility] due to hyponatremia on medical clearance labs), follows outpatient at [facility] w/ Dr Cook (last visit on 6/19/24; on Abioz BELLA, reports next due 7/1/24),  denies hx SA/NSSIB (however per chart review one collateral note cites sister reporting remote SA), denies drug or alcohol use, w/ exception of tobacco, denies legal hx, self presented to the ED for suicidal ideations.   On  initially evaluation, patient presents with auditory hallucinations, paranoid persecutory delusions, with suicidal ideation and plan if discharged back home, hopelessness/guilt/worthlessness and is s/p aborted suicide attempt in the community prior to arrival to the ED.  At this time, patient is unable to contract for safety in the community and is requesting and meets criteria for voluntary inpatient psychiatric admission for safety, stabilization and medication optimization (agreeable to further Haldol increase). Patient reports they would be able to maintain safety from suicidal standpoint while on inpatient unit and if this changed they would tell nursing staff and denies active HI at this time, thus no 1:1 indicated at this time.   Patient initially noted to be with Na of 130, which as per primary team appears to be his baseline.  As per primary team, no nephrology consult indicated nor salt tabs and recommended fluid restriction of 1.5L/ day once on inpatient unit. Repeat was requested to ensure not downtrending and follow up Na was 135; as per primary team, patient is medically cleared and no further intervention indicated at this time.     On the unit, patient reported SA by OD, denies any SI or depressive sx currently. Reports AH/delusions that are much improved inside hospital and exacerbated at this residence.    Recommendations:   -admit on 9.13 legal status  -hx of hyponatremia on initial lab draw / polydipsia as per chart review: patient's repeat Na WNL and as per primary team nothing to do other than fluid restriction of 1.5L/day on unit; f/u Na 131 on 6/23, repeat Na 133 on 6/28  -titrate Haldol to 10 mg PO qAM and 10mg PO qhs (increased to 5mg BID on 6/19 as per outpatient note) - given Haldol Decanoate 100 mg IM on 7/5, next dose on Monday  -confirm patient's last Abilify BELLA in face due 7/1 and that last IVIG given q monthly (reports last given on 6/20)  -HTN: continue amLODIPine 5 mg oral tablet: 1 tab(s) orally once a day, labetalol 200 mg oral tablet: 1 tab(s) orally 2 times a day, losartan 100 mg oral tablet: 1 tab(s) orally once a day  -HLD: continue atorvastatin 40 mg oral tablet: 1 tab(s) orally once a day (at bedtime)  -Hypothyroid: continue levothyroxine 50 mcg (0.05 mg) oral tablet: 1 tab(s) orally once a day  -Tobacco Use Disorder: NRT  -DM: continue metFORMIN 500 mg oral tablet: 1 tab(s) orally 2 times a day and insulin sliding scale  -For agitation please attempt verbal de-escalation and behavioral intervention first. If patient does not respond to above, may give haldol 5 mg po q6h prn, ativan 2 mg po q6h prn if no contraindications. If patient is refusing PO, remains an imminent danger to self or others and If Patient's  qtc <500, can escalate to IM formulation. If IM antipsychotic is administered, please perform follow-up ECG for QTc monitoring.  
Patient is a 58 y o male, domiciled in supportive housing [place], on SSD, single, unemployed, w/ PMHx HTN, DM2 (last HBa1c from 5/2024 was 5.7), hypogammaglobulinemia (for which he receives monthly infusions; reports last was yesterday), w/ PPHx schizoaffective d/o, hx of multiple inpatient psychiatric hospitalizations (last known in 2024 at [facility] due to hyponatremia on medical clearance labs), follows outpatient at [facility] w/ Dr Cook (last visit on 6/19/24; on Abioz BELLA, reports next due 7/1/24),  denies hx SA/NSSIB (however per chart review one collateral note cites sister reporting remote SA), denies drug or alcohol use, w/ exception of tobacco, denies legal hx, self presented to the ED for suicidal ideations.   On  initially evaluation, patient presents with auditory hallucinations, paranoid persecutory delusions, with suicidal ideation and plan if discharged back home, hopelessness/guilt/worthlessness and is s/p aborted suicide attempt in the community prior to arrival to the ED.  At this time, patient is unable to contract for safety in the community and is requesting and meets criteria for voluntary inpatient psychiatric admission for safety, stabilization and medication optimization (agreeable to further Haldol increase). Patient reports they would be able to maintain safety from suicidal standpoint while on inpatient unit and if this changed they would tell nursing staff and denies active HI at this time, thus no 1:1 indicated at this time.   Patient initially noted to be with Na of 130, which as per primary team appears to be his baseline.  As per primary team, no nephrology consult indicated nor salt tabs and recommended fluid restriction of 1.5L/ day once on inpatient unit. Repeat was requested to ensure not downtrending and follow up Na was 135; as per primary team, patient is medically cleared and no further intervention indicated at this time.     On the unit, patient reported SA by OD, denies any SI or depressive sx currently. Reports AH/delusions that are much improved inside hospital and exacerbated at this residence.    Recommendations:   -admit on 9.13 legal status  -hx of hyponatremia on initial lab draw / polydipsia as per chart review: patient's repeat Na WNL and as per primary team nothing to do other than fluid restriction of 1.5L/day on unit; f/u Na 131 on 6/23, repeat Na 133 on 6/28  -titrate Haldol to 10 mg PO qAM and 10mg PO qhs (increased to 5mg BID on 6/19 as per outpatient note) - given Haldol Decanoate 100 mg IM on 7/5, next dose on Monday  -confirm patient's last Abilify BELLA in face due 7/1 and that last IVIG given q monthly (reports last given on 6/20)  -HTN: continue amLODIPine 5 mg oral tablet: 1 tab(s) orally once a day, labetalol 200 mg oral tablet: 1 tab(s) orally 2 times a day, losartan 100 mg oral tablet: 1 tab(s) orally once a day  -HLD: continue atorvastatin 40 mg oral tablet: 1 tab(s) orally once a day (at bedtime)  -Hypothyroid: continue levothyroxine 50 mcg (0.05 mg) oral tablet: 1 tab(s) orally once a day  -Tobacco Use Disorder: NRT  -DM: continue metFORMIN 500 mg oral tablet: 1 tab(s) orally 2 times a day and insulin sliding scale  -For agitation please attempt verbal de-escalation and behavioral intervention first. If patient does not respond to above, may give haldol 5 mg po q6h prn, ativan 2 mg po q6h prn if no contraindications. If patient is refusing PO, remains an imminent danger to self or others and If Patient's  qtc <500, can escalate to IM formulation. If IM antipsychotic is administered, please perform follow-up ECG for QTc monitoring.  
Patient is a 58 y o male, domiciled in supportive housing [place], on SSD, single, unemployed, w/ PMHx HTN, DM2 (last HBa1c from 5/2024 was 5.7), hypogammaglobulinemia (for which he receives monthly infusions; reports last was yesterday), w/ PPHx schizoaffective d/o, hx of multiple inpatient psychiatric hospitalizations (last known in 2024 at [facility] due to hyponatremia on medical clearance labs), follows outpatient at [facility] w/ Dr Cook (last visit on 6/19/24; on Abioz BELLA, reports next due 7/1/24),  denies hx SA/NSSIB (however per chart review one collateral note cites sister reporting remote SA), denies drug or alcohol use, w/ exception of tobacco, denies legal hx, self presented to the ED for suicidal ideations.   On  initially evaluation, patient presents with auditory hallucinations, paranoid persecutory delusions, with suicidal ideation and plan if discharged back home, hopelessness/guilt/worthlessness and is s/p aborted suicide attempt in the community prior to arrival to the ED.  At this time, patient is unable to contract for safety in the community and is requesting and meets criteria for voluntary inpatient psychiatric admission for safety, stabilization and medication optimization (agreeable to further Haldol increase). Patient reports they would be able to maintain safety from suicidal standpoint while on inpatient unit and if this changed they would tell nursing staff and denies active HI at this time, thus no 1:1 indicated at this time.   Patient initially noted to be with Na of 130, which as per primary team appears to be his baseline.  As per primary team, no nephrology consult indicated nor salt tabs and recommended fluid restriction of 1.5L/ day once on inpatient unit. Repeat was requested to ensure not downtrending and follow up Na was 135; as per primary team, patient is medically cleared and no further intervention indicated at this time.     On the unit, patient reported SA by OD, denies any SI or depressive sx currently. Reports AH/delusions that are much improved inside hospital and exacerbated at this residence.    Recommendations:   -admit on 9.13 legal status  -hx of hyponatremia on initial lab draw / polydipsia as per chart review: patient's repeat Na WNL and as per primary team nothing to do other than fluid restriction of 1.5L/day on unit; f/u Na 131 on 6/23, repeat Na 133 on 6/28  -titrate Haldol to 5mg PO qAM and 10mg PO qhs (increased to 5mg BID on 6/19 as per outpatient note)  -confirm patient's last Abilify BELLA in face due 7/1 and that last IVIG given q monthly (reports last given on 6/20)  -HTN: continue amLODIPine 5 mg oral tablet: 1 tab(s) orally once a day, labetalol 200 mg oral tablet: 1 tab(s) orally 2 times a day, losartan 100 mg oral tablet: 1 tab(s) orally once a day  -HLD: continue atorvastatin 40 mg oral tablet: 1 tab(s) orally once a day (at bedtime)  -Hypothyroid: continue levothyroxine 50 mcg (0.05 mg) oral tablet: 1 tab(s) orally once a day  -Tobacco Use Disorder: NRT  -DM: continue metFORMIN 500 mg oral tablet: 1 tab(s) orally 2 times a day and insulin sliding scale  -For agitation please attempt verbal de-escalation and behavioral intervention first. If patient does not respond to above, may give haldol 5 mg po q6h prn, ativan 2 mg po q6h prn if no contraindications. If patient is refusing PO, remains an imminent danger to self or others and If Patient's  qtc <500, can escalate to IM formulation. If IM antipsychotic is administered, please perform follow-up ECG for QTc monitoring.  
Patient is a 58 y o male, domiciled in supportive housing [place], on SSD, single, unemployed, w/ PMHx HTN, DM2 (last HBa1c from 5/2024 was 5.7), hypogammaglobulinemia (for which he receives monthly infusions; reports last was yesterday), w/ PPHx schizoaffective d/o, hx of multiple inpatient psychiatric hospitalizations (last known in 2024 at [facility] due to hyponatremia on medical clearance labs), follows outpatient at [facility] w/ Dr Cook (last visit on 6/19/24; on Abioz BELLA, reports next due 7/1/24),  denies hx SA/NSSIB (however per chart review one collateral note cites sister reporting remote SA), denies drug or alcohol use, w/ exception of tobacco, denies legal hx, self presented to the ED for suicidal ideations.   On  initially evaluation, patient presents with auditory hallucinations, paranoid persecutory delusions, with suicidal ideation and plan if discharged back home, hopelessness/guilt/worthlessness and is s/p aborted suicide attempt in the community prior to arrival to the ED.  At this time, patient is unable to contract for safety in the community and is requesting and meets criteria for voluntary inpatient psychiatric admission for safety, stabilization and medication optimization (agreeable to further Haldol increase). Patient reports they would be able to maintain safety from suicidal standpoint while on inpatient unit and if this changed they would tell nursing staff and denies active HI at this time, thus no 1:1 indicated at this time.   Patient initially noted to be with Na of 130, which as per primary team appears to be his baseline.  As per primary team, no nephrology consult indicated nor salt tabs and recommended fluid restriction of 1.5L/ day once on inpatient unit. Repeat was requested to ensure not downtrending and follow up Na was 135; as per primary team, patient is medically cleared and no further intervention indicated at this time.     On the unit, patient reported SA by OD, denies any SI or depressive sx currently. Reports AH/delusions that are much improved inside hospital and exacerbated at this residence.    Recommendations:   -admit on 9.13 legal status  -hx of hyponatremia on initial lab draw / polydipsia as per chart review: patient's repeat Na WNL and as per primary team nothing to do other than fluid restriction of 1.5L/day on unit; f/u Na 131 on 6/23, repeat Na 133 on 6/28  -titrate Haldol to 5mg PO qAM and 10mg PO qhs (increased to 5mg BID on 6/19 as per outpatient note)  -confirm patient's last Abilify BELLA in face due 7/1 and that last IVIG given q monthly (reports last given on 6/20)  -HTN: continue amLODIPine 5 mg oral tablet: 1 tab(s) orally once a day, labetalol 200 mg oral tablet: 1 tab(s) orally 2 times a day, losartan 100 mg oral tablet: 1 tab(s) orally once a day  -HLD: continue atorvastatin 40 mg oral tablet: 1 tab(s) orally once a day (at bedtime)  -Hypothyroid: continue levothyroxine 50 mcg (0.05 mg) oral tablet: 1 tab(s) orally once a day  -Tobacco Use Disorder: NRT  -DM: continue metFORMIN 500 mg oral tablet: 1 tab(s) orally 2 times a day and insulin sliding scale  -For agitation please attempt verbal de-escalation and behavioral intervention first. If patient does not respond to above, may give haldol 5 mg po q6h prn, ativan 2 mg po q6h prn if no contraindications. If patient is refusing PO, remains an imminent danger to self or others and If Patient's  qtc <500, can escalate to IM formulation. If IM antipsychotic is administered, please perform follow-up ECG for QTc monitoring.  
Patient is a 58 y o male, domiciled in supportive housing [place], on [place], single, unemployed, w/ PMHx HTN, DM2 (last HBa1c from 5/2024 was 5.7), hypogammaglobulinemia (for which he receives monthly infusions; reports last was yesterday), w/ PPHx schizoaffective d/o, hx of multiple inpatient psychiatric hospitalizations (last known in 2024 at [facility] due to hyponatremia on medical clearance labs), follows outpatient at [facility] w/ Dr Cook (last visit on 6/19/24; on Abioz BELLA, reports next due 7/1/24),  denies hx SA/NSSIB (however per chart review one collateral note cites sister reporting remote SA), denies drug or alcohol use, w/ exception of tobacco, denies legal hx, self presented to the ED for suicidal ideations.   On  initially evaluation, patient presents with auditory hallucinations, paranoid persecutory delusions, with suicidal ideation and plan if discharged back home, hopelessness/guilt/worthlessness and is s/p aborted suicide attempt in the community prior to arrival to the ED.  At this time, patient is unable to contract for safety in the community and is requesting and meets criteria for voluntary inpatient psychiatric admission for safety, stabilization and medication optimization (agreeable to further Haldol increase). Patient reports they would be able to maintain safety from suicidal standpoint while on inpatient unit and if this changed they would tell nursing staff and denies active HI at this time, thus no 1:1 indicated at this time.   Patient initially noted to be with Na of 130, which as per primary team appears to be his baseline.  As per primary team, no nephrology consult indicated nor salt tabs and recommended fluid restriction of 1.5L/ day once on inpatient unit. Repeat was requested to ensure not downtrending and follow up Na was 135; as per primary team, patient is medically cleared and no further intervention indicated at this time.     On the unit, patient reported SA by OD, denies any SI or depressive sx currently. Reports AH/delusions that are much improved inside hospital and exacerbated at this residence.    Recommendations:   -admit on 9.13 legal status  -hx of hyponatremia on initial lab draw / polydipsia as per chart review: patient's repeat Na WNL and as per primary team nothing to do other than fluid restriction of 1.5L/day on unit; f/u Na 131 on 6/23, repeat Na 133 on 6/28  -titrate Haldol to 10 mg PO qAM and 10mg PO qhs (increased to 5mg BID on 6/19 as per outpatient note) - given Haldol Decanoate 100 mg IM on 7/5, next dose on 7/10  -confirm patient's last Abilify BELLA in face due 7/1 and that last IVIG given q monthly (reports last given on 6/20)  -HTN: continue amLODIPine 5 mg oral tablet: 1 tab(s) orally once a day, labetalol 200 mg oral tablet: 1 tab(s) orally 2 times a day, losartan 100 mg oral tablet: 1 tab(s) orally once a day  -HLD: continue atorvastatin 40 mg oral tablet: 1 tab(s) orally once a day (at bedtime)  -Hypothyroid: continue levothyroxine 50 mcg (0.05 mg) oral tablet: 1 tab(s) orally once a day  -Tobacco Use Disorder: NRT  -DM: continue metFORMIN 500 mg oral tablet: 1 tab(s) orally 2 times a day and insulin sliding scale  -For agitation please attempt verbal de-escalation and behavioral intervention first. If patient does not respond to above, may give haldol 5 mg po q6h prn, ativan 2 mg po q6h prn if no contraindications. If patient is refusing PO, remains an imminent danger to self or others and If Patient's  qtc <500, can escalate to IM formulation. If IM antipsychotic is administered, please perform follow-up ECG for QTc monitoring.  
Patient is a 58 y o male, domiciled in supportive housing [facility], on SSD, single, unemployed, w/ PMHx HTN, DM2 (last HBa1c from 5/2024 was 5.7), hypogammaglobulinemia (for which he receives monthly infusions; reports last was yesterday), w/ PPHx schizoaffective d/o, hx of multiple inpatient psychiatric hospitalizations (last known in 2024 at [facility] due to hyponatremia on medical clearance labs), follows outpatient at [facility] w/ Dr Cook (last visit on 6/19/24; on Abioz BELLA, reports next due 7/1/24),  denies hx SA/NSSIB (however per chart review one collateral note cites sister reporting remote SA), denies drug or alcohol use, w/ exception of tobacco, denies legal hx, self presented to the ED for suicidal ideations.   On  initially evaluation, patient presents with auditory hallucinations, paranoid persecutory delusions, with suicidal ideation and plan if discharged back home, hopelessness/guilt/worthlessness and is s/p aborted suicide attempt in the community prior to arrival to the ED.  At this time, patient is unable to contract for safety in the community and is requesting and meets criteria for voluntary inpatient psychiatric admission for safety, stabilization and medication optimization (agreeable to further Haldol increase). Patient reports they would be able to maintain safety from suicidal standpoint while on inpatient unit and if this changed they would tell nursing staff and denies active HI at this time, thus no 1:1 indicated at this time.   Patient initially noted to be with Na of 130, which as per primary team appears to be his baseline.  As per primary team, no nephrology consult indicated nor salt tabs and recommended fluid restriction of 1.5L/ day once on inpatient unit. Repeat was requested to ensure not downtrending and follow up Na was 135; as per primary team, patient is medically cleared and no further intervention indicated at this time.     On the unit, patient reported SA by OD, denies any SI or depressive sx currently. Reports AH/delusions that are much improved inside hospital and exacerbated at this residence.    Recommendations:   -admit on 9.13 legal status  -hx of hyponatremia on initial lab draw / polydipsia as per chart review: patient's repeat Na WNL and as per primary team nothing to do other than fluid restriction of 1.5L/day on unit; f/u Na 131 on 6/23, repeat on 6/26  -titrate Haldol to 5mg PO qAM and 7mg PO qhs (increased to 5mg BID on 6/19 as per outpatient note)  -confirm patient's last Abilify BELLA in face due 7/1 and that last IVIG given q monthly (reports last given on 6/20)  -HTN: continue amLODIPine 5 mg oral tablet: 1 tab(s) orally once a day, labetalol 200 mg oral tablet: 1 tab(s) orally 2 times a day, losartan 100 mg oral tablet: 1 tab(s) orally once a day  -HLD: continue atorvastatin 40 mg oral tablet: 1 tab(s) orally once a day (at bedtime)  -Hypothyroid: continue levothyroxine 50 mcg (0.05 mg) oral tablet: 1 tab(s) orally once a day  -Tobacco Use Disorder: NRT  -DM: continue metFORMIN 500 mg oral tablet: 1 tab(s) orally 2 times a day and insulin sliding scale  -For agitation please attempt verbal de-escalation and behavioral intervention first. If patient does not respond to above, may give haldol 5 mg po q6h prn, ativan 2 mg po q6h prn if no contraindications. If patient is refusing PO, remains an imminent danger to self or others and If Patient's  qtc <500, can escalate to IM formulation. If IM antipsychotic is administered, please perform follow-up ECG for QTc monitoring.

## 2024-07-11 NOTE — BH PSYCHOLOGY - GROUP THERAPY NOTE - NSPSYCHOLGRPCOGINT_PSY_A_CORE FT
Cognitive/behavioral therapy, Acceptance and Commitment Therapy, Emotion regulation/coping skills taught, Psychoed 

## 2024-07-11 NOTE — BH PSYCHOLOGY - CLINICIAN PSYCHOTHERAPY NOTE - NSBHPSYCHOLTRAN_PSY_ALL_CORE
Patient states she wants new RX's for congestion, nasal pressure, ears sore, post nasal drip, please advise. No

## 2024-07-11 NOTE — CHART NOTE - NSCHARTNOTEFT_GEN_A_CORE
[facility] Inpatient to ED Transfer Summary    Reason for Transfer/Medical Summary: L Hip abscess       PAST MEDICAL & SURGICAL HISTORY:  Smoker      DM (diabetes mellitus)      HTN (hypertension)      Abscess of finger      Bipolar disorder      Chronic hyponatremia      CVID (common variable immunodeficiency)      Hyponatremia      Smoker      Deviated septum      Loss of teeth due to extraction  All teeth due to dental carries          Allergies    amoxicillin (Fever)  penicillin (Rash)    Intolerances        MEDICATIONS  (STANDING):  amLODIPine   Tablet 5 milliGRAM(s) Oral daily  atorvastatin 40 milliGRAM(s) Oral at bedtime  glucagon  Injectable 1 milliGRAM(s) IntraMuscular once  haloperidol     Tablet 10 milliGRAM(s) Oral two times a day  insulin lispro (ADMELOG) corrective regimen sliding scale   SubCutaneous three times a day before meals  labetalol 200 milliGRAM(s) Oral two times a day  levothyroxine 50 MICROGram(s) Oral daily  losartan 100 milliGRAM(s) Oral daily  metFORMIN 500 milliGRAM(s) Oral two times a day  nicotine - 21 mG/24Hr(s) Patch 1 Patch Transdermal daily    MEDICATIONS  (PRN):  acetaminophen     Tablet .. 650 milliGRAM(s) Oral every 6 hours PRN Mild Pain (1 - 3), Moderate Pain (4 - 6)  dextrose Oral Gel 15 Gram(s) Oral once PRN Blood Glucose LESS THAN 70 milliGRAM(s)/deciliter  haloperidol     Tablet 5 milliGRAM(s) Oral every 6 hours PRN agitation  haloperidol    Injectable 5 milliGRAM(s) IntraMuscular Once PRN agitation  hydrOXYzine hydrochloride 50 milliGRAM(s) Oral every 6 hours PRN anxiety  polyethylene glycol 3350 17 Gram(s) Oral daily PRN constipation      Vital Signs Last 24 Hrs  T(C): 36.4 (11 Jul 2024 19:20), Max: 36.8 (11 Jul 2024 07:55)  T(F): 97.6 (11 Jul 2024 19:20), Max: 98.2 (11 Jul 2024 07:55)  HR: -- 67b/ min  BP: -- 142/ 74.  BP(mean): --  RR: 18 (11 Jul 2024 19:20) (16 - 18)  SpO2: --      CAPILLARY BLOOD GLUCOSE      POCT Blood Glucose.: 95 mg/dL (11 Jul 2024 20:06)  POCT Blood Glucose.: 159 mg/dL (11 Jul 2024 16:04)  POCT Blood Glucose.: 90 mg/dL (11 Jul 2024 11:36)  POCT Blood Glucose.: 106 mg/dL (11 Jul 2024 07:33)        PHYSICAL EXAM:  GENERAL: NAD.   HEAD:  Atraumatic, Normocephalic  EYES: EOMI, PERRLA, conjunctiva and sclera clear  NECK: Supple, No JVD  CHEST/LUNG: Clear to auscultation bilaterally; No wheeze  HEART: Regular rate and rhythm; No murmurs, rubs, or gallops  ABDOMEN: Soft, Nontender, Nondistended; Bowel sounds present  EXTREMITIES:  2+ Peripheral Pulses, No clubbing, cyanosis, or edema  PSYCH: AAOx3  NEUROLOGY: non-focal  SKIN: 3" patch with erythema, warmth, tenderness, fluctuant.      LABS:    58M admitted to [facility] for schizophrenia, with a 3" abscess to L hip that the pt says the lesion started on it's own 1 week ago. Positive erythema, warmth, tenderness and fluctuant.  Afebrile, VSS Pt with hx of DM and being sent to the ED for evaluation to drain L abscess and iv/ po abx. ED MD, emergency contact Mrs Arias @ [phone] and A.D.N all called and aware of the pt's condition and transfer to the ED.                 Psychiatry Section:  Psychiatric Summary/[facility] admitting diagnosis:    Psychiatric Recommendations:    Observation status (check one):   ( ) Constant Observation  ( ) Enhanced care  ( ) Routine checks    Risk Status (check all that apply if present):  ( ) at risk for suicide/self-injury  ( ) at risk for aggressive behavior  ( ) at risk for elopement  ( ) other risk:

## 2024-07-11 NOTE — BH INPATIENT PSYCHIATRY PROGRESS NOTE - NSBHMSEAFFQUAL_PSY_A_CORE
Depressed
Depressed
Euthymic
Depressed
Euthymic
Depressed
Euthymic
Depressed

## 2024-07-11 NOTE — BH INPATIENT PSYCHIATRY PROGRESS NOTE - NSDCCRITERIA_PSY_ALL_CORE
symptom management, safety planning, care coordination 

## 2024-07-11 NOTE — BH INPATIENT PSYCHIATRY PROGRESS NOTE - NSTXDCSOCDATENEW_PSY_ALL_CORE
01-Jul-2024

## 2024-07-11 NOTE — BH INPATIENT PSYCHIATRY PROGRESS NOTE - NSTXDCSOCDATETRGT_PSY_ALL_CORE
16-Jul-2024
01-Jul-2024
09-Jul-2024
01-Jul-2024
01-Jul-2024
09-Jul-2024
01-Jul-2024
09-Jul-2024
16-Jul-2024
09-Jul-2024
16-Jul-2024
01-Jul-2024

## 2024-07-11 NOTE — BH INPATIENT PSYCHIATRY PROGRESS NOTE - NSTXSUICIDDATETRGT_PSY_ALL_CORE
07-Jul-2024
03-Jul-2024
07-Jul-2024
30-Jun-2024
07-Jul-2024
17-Jul-2024
30-Jun-2024
03-Jul-2024
03-Jul-2024
07-Jul-2024
07-Jul-2024
17-Jul-2024
03-Jul-2024
03-Jul-2024
07-Jul-2024

## 2024-07-11 NOTE — BH PSYCHOLOGY - GROUP THERAPY NOTE - NSBHPSYCHOLRESPCOMMENT_PSY_A_CORE FT
Pt reported that he spends time with his friends as a form of self care
Pt reported that he enjoys spending time with his friends outside
The patient appeared adequately groomed and casually dressed. Pt appeared very well engaged in the group as evidenced by his willingness to independently verbally communicate during the discussion. Pt shared goals and hopes for his career, highlighting that he values helping others. Pt noted that his father’s values did not always align with his own, but that he is committed to living according to what is most important to him. Speech WNL. PT was oriented X3. Pt was appropriate and supportive with peers.
The patient appeared adequately groomed and casually dressed. Pt appeared very well engaged in the group as evidenced by his willingness to independently verbally communicate during the discussion. Pt shared the challenges of validating his own emotions, particularly what it seems like “others have it worse.” Pt validated other group member’s emotions in the moment. Speech WNL. PT was oriented X3. Pt was appropriate and supportive with peers.

## 2024-07-11 NOTE — BH PSYCHOLOGY - CLINICIAN PSYCHOTHERAPY NOTE - NSBHPSYCHOLINT_PSY_A_CORE
Cognitive/behavioral therapy/Dynamic issues addressed/Stress management/Supported coping skills/Supportive therapy

## 2024-07-11 NOTE — BH INPATIENT PSYCHIATRY PROGRESS NOTE - NSTXSUICIDPROGRES_PSY_ALL_CORE
Improving
No Change
Improving
No Change
Improving
No Change
No Change
Improving
No Change
Improving

## 2024-07-11 NOTE — BH PSYCHOLOGY - GROUP THERAPY NOTE - NSPSYCHOLGRPCOGPT_PSY_A_CORE FT
Patient attended Cognitive Behavioral Therapy Group utilizing concepts and skills from Acceptance and Commitment Therapy (ACT). The group started with a brief check in/mindfulness exercise. The group then focused on the topic of acceptance. The patients discussed times when they struggled to accept circumstances/events/emotions ongoing in their lives. Patients also discussed the benefits of acceptance and building a future worth living. The  explained concepts, reinforced participation, and engaged patients in the discussion. 
Patient attended Cognitive Behavioral Therapy Group incorporating ACT-based concepts. The group started with a brief check-in asking Pts to share a place they would like to visit and why. Members then engaged in a candle visualization exercise and were encouraged to share their thoughts/reactions. Lastly, Group focused on discussing the importance of/what it looks like to validate themselves and others, the significance of validation in relationships, and techniques for engaging in the practice of validating their own thoughts and feelings. Group facilitator explained concepts, reinforced participation, and engaged patients in the discussion. 
Patient attended Cognitive Behavioral Therapy Group incorporating ACT-based concepts. The group started with a brief check-in asking Pts to share what they would title a book about their life. Members then reacted to a metaphor/exercise about acceptance and the impacts of suppressing intrusive or uncomfortable thoughts. Lastly, Group focused on engaging in a values exploration card game; discussing how these values have changed throughout different stages of their life/how they differ from others, and how they impact life goals. Group facilitator explained concepts, reinforced participation, and engaged patients in the discussion. 
Patient attended Cognitive Behavioral Therapy Group utilizing concepts and skills from Acceptance and Commitment Therapy (ACT). The group started with a brief check in/mindfulness exercise. The group then focused on the topic of opposite sides that can both be true. The patients discussed examples of times they engaged in black or white thinking. Patients also discussed the benefits of seeing multiple perspectives. The  explained concepts, reinforced participation, and engaged patients in the discussion.

## 2024-07-11 NOTE — BH INPATIENT PSYCHIATRY PROGRESS NOTE - NSBHCHARTREVIEWVS_PSY_A_CORE FT
Vital Signs Last 24 Hrs  T(C): 36.8 (06-26-24 @ 08:50), Max: 36.8 (06-25-24 @ 19:17)  T(F): 98.2 (06-26-24 @ 08:50), Max: 98.2 (06-25-24 @ 19:17)  HR: --  BP: --  BP(mean): --  RR: 16 (06-26-24 @ 08:50) (16 - 16)  SpO2: --    Orthostatic VS  06-26-24 @ 08:50  Lying BP: --/-- HR: --  Sitting BP: 133/75 HR: 67  Standing BP: 120/73 HR: 77  Site: --  Mode: --  Orthostatic VS  06-26-24 @ 06:53  Lying BP: --/-- HR: --  Sitting BP: 131/73 HR: 61  Standing BP: 111/63 HR: 68  Site: upper left arm  Mode: electronic  Orthostatic VS  06-25-24 @ 19:17  Lying BP: --/-- HR: --  Sitting BP: 138/71 HR: 85  Standing BP: 128/87 HR: 102  Site: --  Mode: --  Orthostatic VS  06-25-24 @ 08:11  Lying BP: --/-- HR: --  Sitting BP: 134/74 HR: 62  Standing BP: 103/72 HR: 72  Site: upper left arm  Mode: electronic  Orthostatic VS  06-24-24 @ 19:29  Lying BP: --/-- HR: --  Sitting BP: 131/88 HR: 68  Standing BP: 143/68 HR: 76  Site: --  Mode: --  
Vital Signs Last 24 Hrs  T(C): 36.7 (07-10-24 @ 08:07), Max: 36.7 (07-10-24 @ 08:07)  T(F): 98.1 (07-10-24 @ 08:07), Max: 98.1 (07-10-24 @ 08:07)  HR: --  BP: --  BP(mean): --  RR: 16 (07-09-24 @ 20:44) (16 - 16)  SpO2: --    Orthostatic VS  07-10-24 @ 08:07  Lying BP: --/-- HR: --  Sitting BP: 124/67 HR: 63  Standing BP: 114/67 HR: 75  Site: --  Mode: electronic  Orthostatic VS  07-09-24 @ 19:06  Lying BP: --/-- HR: --  Sitting BP: 145/76 HR: 70  Standing BP: 128/77 HR: 78  Site: --  Mode: --  Orthostatic VS  07-09-24 @ 08:40  Lying BP: --/-- HR: --  Sitting BP: 132/65 HR: 66  Standing BP: 119/67 HR: 75  Site: --  Mode: --  Orthostatic VS  07-08-24 @ 19:18  Lying BP: --/-- HR: --  Sitting BP: 148/69 HR: 62  Standing BP: 125/61 HR: 67  Site: --  Mode: --  
Vital Signs Last 24 Hrs  T(C): 36.4 (07-08-24 @ 06:18), Max: 36.5 (07-07-24 @ 19:34)  T(F): 97.6 (07-08-24 @ 06:18), Max: 97.7 (07-07-24 @ 19:34)  HR: --  BP: --  BP(mean): --  RR: 17 (07-08-24 @ 08:01) (17 - 17)  SpO2: --    Orthostatic VS  07-08-24 @ 06:18  Lying BP: --/-- HR: --  Sitting BP: 130/74 HR: 61  Standing BP: 125/66 HR: 69  Site: --  Mode: --  Orthostatic VS  07-07-24 @ 19:34  Lying BP: --/-- HR: --  Sitting BP: 146/83 HR: 77  Standing BP: 130/68 HR: 80  Site: --  Mode: electronic  Orthostatic VS  07-07-24 @ 06:18  Lying BP: --/-- HR: --  Sitting BP: 122/70 HR: 73  Standing BP: 134/69 HR: 66  Site: --  Mode: --  Orthostatic VS  07-06-24 @ 19:14  Lying BP: --/-- HR: --  Sitting BP: 125/66 HR: 60  Standing BP: 116/61 HR: 64  Site: --  Mode: --  
Vital Signs Last 24 Hrs  T(C): 36.7 (06-25-24 @ 08:11), Max: 36.7 (06-25-24 @ 08:11)  T(F): 98.1 (06-25-24 @ 08:11), Max: 98.1 (06-25-24 @ 08:11)  HR: --  BP: --  BP(mean): --  RR: 16 (06-24-24 @ 19:29) (16 - 16)  SpO2: --    Orthostatic VS  06-25-24 @ 08:11  Lying BP: --/-- HR: --  Sitting BP: 134/74 HR: 62  Standing BP: 103/72 HR: 72  Site: upper left arm  Mode: electronic  Orthostatic VS  06-24-24 @ 19:29  Lying BP: --/-- HR: --  Sitting BP: 131/88 HR: 68  Standing BP: 143/68 HR: 76  Site: --  Mode: --  Orthostatic VS  06-24-24 @ 08:22  Lying BP: --/-- HR: --  Sitting BP: 134/65 HR: 69  Standing BP: 121/67 HR: 80  Site: --  Mode: --  Orthostatic VS  06-23-24 @ 19:59  Lying BP: --/-- HR: --  Sitting BP: 143/67 HR: 66  Standing BP: 135/60 HR: 70  Site: --  Mode: --  
Vital Signs Last 24 Hrs  T(C): 37.2 (07-05-24 @ 07:51), Max: 37.2 (07-05-24 @ 07:51)  T(F): 98.9 (07-05-24 @ 07:51), Max: 98.9 (07-05-24 @ 07:51)  HR: --  BP: --  BP(mean): --  RR: 16 (07-05-24 @ 07:51) (16 - 16)  SpO2: --    Orthostatic VS  07-05-24 @ 07:51  Lying BP: --/-- HR: --  Sitting BP: 135/74 HR: 67  Standing BP: 138/69 HR: 74  Site: upper left arm  Mode: electronic  Orthostatic VS  07-04-24 @ 19:25  Lying BP: --/-- HR: --  Sitting BP: 149/92 HR: 109  Standing BP: 125/82 HR: 115  Site: --  Mode: --  Orthostatic VS  07-04-24 @ 08:03  Lying BP: --/-- HR: --  Sitting BP: 132/71 HR: 63  Standing BP: 123/75 HR: 71  Site: --  Mode: --  Orthostatic VS  07-03-24 @ 19:23  Lying BP: --/-- HR: --  Sitting BP: 142/70 HR: 67  Standing BP: 124/68 HR: 73  Site: --  Mode: --  
Vital Signs Last 24 Hrs  T(C): 36.1 (07-01-24 @ 07:53), Max: 36.6 (06-30-24 @ 19:15)  T(F): 97 (07-01-24 @ 07:53), Max: 97.8 (06-30-24 @ 19:15)  HR: --  BP: --  BP(mean): --  RR: 17 (07-01-24 @ 07:53) (16 - 17)  SpO2: --    Orthostatic VS  07-01-24 @ 07:53  Lying BP: --/-- HR: --  Sitting BP: 141/71 HR: 68  Standing BP: 139/68 HR: 71  Site: upper left arm  Mode: electronic  Orthostatic VS  06-30-24 @ 19:15  Lying BP: --/-- HR: --  Sitting BP: 128/68 HR: 56  Standing BP: 126/74 HR: 60  Site: --  Mode: --  Orthostatic VS  06-30-24 @ 07:46  Lying BP: --/-- HR: --  Sitting BP: 130/79 HR: 66  Standing BP: 123/67 HR: 72  Site: --  Mode: --  Orthostatic VS  06-29-24 @ 19:42  Lying BP: --/-- HR: --  Sitting BP: 131/67 HR: 60  Standing BP: 114/60 HR: 64  Site: --  Mode: --  
Vital Signs Last 24 Hrs  T(C): 36.6 (06-27-24 @ 07:53), Max: 36.6 (06-27-24 @ 07:53)  T(F): 97.9 (06-27-24 @ 07:53), Max: 97.9 (06-27-24 @ 07:53)  HR: --  BP: --  BP(mean): --  RR: 17 (06-27-24 @ 07:53) (16 - 17)  SpO2: --    Orthostatic VS  06-27-24 @ 07:53  Lying BP: --/-- HR: --  Sitting BP: 124/79 HR: 63  Standing BP: 134/67 HR: 74  Site: --  Mode: --  Orthostatic VS  06-26-24 @ 08:50  Lying BP: --/-- HR: --  Sitting BP: 133/75 HR: 67  Standing BP: 120/73 HR: 77  Site: --  Mode: --  Orthostatic VS  06-26-24 @ 06:53  Lying BP: --/-- HR: --  Sitting BP: 131/73 HR: 61  Standing BP: 111/63 HR: 68  Site: upper left arm  Mode: electronic  Orthostatic VS  06-25-24 @ 19:17  Lying BP: --/-- HR: --  Sitting BP: 138/71 HR: 85  Standing BP: 128/87 HR: 102  Site: --  Mode: --  
Vital Signs Last 24 Hrs  T(C): 36.8 (07-03-24 @ 07:45), Max: 36.8 (07-03-24 @ 07:45)  T(F): 98.2 (07-03-24 @ 07:45), Max: 98.2 (07-03-24 @ 07:45)  HR: --  BP: --  BP(mean): --  RR: 17 (07-03-24 @ 07:45) (16 - 17)  SpO2: --    Orthostatic VS  07-03-24 @ 07:45  Lying BP: --/-- HR: --  Sitting BP: 127/82 HR: 64  Standing BP: 132/69 HR: 78  Site: --  Mode: electronic  Orthostatic VS  07-02-24 @ 19:33  Lying BP: --/-- HR: --  Sitting BP: 144/74 HR: 66  Standing BP: 132/69 HR: 73  Site: --  Mode: --  Orthostatic VS  07-02-24 @ 07:54  Lying BP: --/-- HR: --  Sitting BP: 123/70 HR: 65  Standing BP: 120/62 HR: 79  Site: --  Mode: --  Orthostatic VS  07-01-24 @ 19:29  Lying BP: --/-- HR: --  Sitting BP: 123/76 HR: 66  Standing BP: 117/67 HR: 68  Site: --  Mode: --  
Vital Signs Last 24 Hrs  T(C): 36.3 (06-24-24 @ 08:22), Max: 36.6 (06-23-24 @ 19:59)  T(F): 97.3 (06-24-24 @ 08:22), Max: 97.9 (06-23-24 @ 19:59)  HR: --  BP: --  BP(mean): --  RR: 16 (06-24-24 @ 08:22) (16 - 16)  SpO2: --    Orthostatic VS  06-24-24 @ 08:22  Lying BP: --/-- HR: --  Sitting BP: 134/65 HR: 69  Standing BP: 121/67 HR: 80  Site: --  Mode: --  Orthostatic VS  06-23-24 @ 19:59  Lying BP: --/-- HR: --  Sitting BP: 143/67 HR: 66  Standing BP: 135/60 HR: 70  Site: --  Mode: --  Orthostatic VS  06-23-24 @ 08:15  Lying BP: --/-- HR: --  Sitting BP: 128/69 HR: 59  Standing BP: 133/70 HR: 69  Site: --  Mode: --  Orthostatic VS  06-22-24 @ 19:34  Lying BP: --/-- HR: --  Sitting BP: 138/70 HR: 78  Standing BP: 118/63 HR: 82  Site: --  Mode: --  
Vital Signs Last 24 Hrs  T(C): 36.6 (07-09-24 @ 08:40), Max: 36.7 (07-08-24 @ 19:18)  T(F): 97.9 (07-09-24 @ 08:40), Max: 98 (07-08-24 @ 19:18)  HR: --  BP: --  BP(mean): --  RR: 16 (07-09-24 @ 08:40) (16 - 16)  SpO2: --    Orthostatic VS  07-09-24 @ 08:40  Lying BP: --/-- HR: --  Sitting BP: 132/65 HR: 66  Standing BP: 119/67 HR: 75  Site: --  Mode: --  Orthostatic VS  07-08-24 @ 19:18  Lying BP: --/-- HR: --  Sitting BP: 148/69 HR: 62  Standing BP: 125/61 HR: 67  Site: --  Mode: --  Orthostatic VS  07-08-24 @ 06:18  Lying BP: --/-- HR: --  Sitting BP: 130/74 HR: 61  Standing BP: 125/66 HR: 69  Site: --  Mode: --  Orthostatic VS  07-07-24 @ 19:34  Lying BP: --/-- HR: --  Sitting BP: 146/83 HR: 77  Standing BP: 130/68 HR: 80  Site: --  Mode: electronic  
Vital Signs Last 24 Hrs  T(C): 36.8 (07-11-24 @ 07:55), Max: 36.8 (07-10-24 @ 18:51)  T(F): 98.2 (07-11-24 @ 07:55), Max: 98.3 (07-10-24 @ 18:51)  HR: --  BP: --  BP(mean): --  RR: 16 (07-10-24 @ 20:57) (16 - 16)  SpO2: --    Orthostatic VS  07-11-24 @ 07:55  Lying BP: --/-- HR: --  Sitting BP: 120/71 HR: 64  Standing BP: 108/60 HR: 71  Site: --  Mode: electronic  Orthostatic VS  07-10-24 @ 18:51  Lying BP: --/-- HR: --  Sitting BP: 128/61 HR: 59  Standing BP: 115/65 HR: 68  Site: --  Mode: electronic  Orthostatic VS  07-10-24 @ 08:07  Lying BP: --/-- HR: --  Sitting BP: 124/67 HR: 63  Standing BP: 114/67 HR: 75  Site: --  Mode: electronic  Orthostatic VS  07-09-24 @ 19:06  Lying BP: --/-- HR: --  Sitting BP: 145/76 HR: 70  Standing BP: 128/77 HR: 78  Site: --  Mode: --  
Vital Signs Last 24 Hrs  T(C): 37 (06-30-24 @ 07:46), Max: 37 (06-30-24 @ 07:46)  T(F): 98.6 (06-30-24 @ 07:46), Max: 98.6 (06-30-24 @ 07:46)  HR: --  BP: --  BP(mean): --  RR: 16 (06-30-24 @ 07:46) (16 - 17)  SpO2: --    Orthostatic VS  06-30-24 @ 07:46  Lying BP: --/-- HR: --  Sitting BP: 130/79 HR: 66  Standing BP: 123/67 HR: 72  Site: --  Mode: --  Orthostatic VS  06-29-24 @ 19:42  Lying BP: --/-- HR: --  Sitting BP: 131/67 HR: 60  Standing BP: 114/60 HR: 64  Site: --  Mode: --  Orthostatic VS  06-29-24 @ 07:58  Lying BP: --/-- HR: --  Sitting BP: 144/75 HR: 60  Standing BP: 138/68 HR: 70  Site: upper left arm  Mode: electronic  Orthostatic VS  06-28-24 @ 19:34  Lying BP: --/-- HR: --  Sitting BP: 153/81 HR: 61  Standing BP: 133/79 HR: 66  Site: --  Mode: --  
Vital Signs Last 24 Hrs  T(C): 35.7 (06-28-24 @ 07:48), Max: 36.6 (06-27-24 @ 19:28)  T(F): 96.3 (06-28-24 @ 07:48), Max: 97.9 (06-27-24 @ 19:28)  HR: --  BP: --  BP(mean): --  RR: 16 (06-27-24 @ 22:29) (16 - 17)  SpO2: --    Orthostatic VS  06-28-24 @ 07:48  Lying BP: --/-- HR: --  Sitting BP: 136/67 HR: 67  Standing BP: 119/67 HR: 62  Site: --  Mode: --  Orthostatic VS  06-27-24 @ 22:29  Lying BP: --/-- HR: --  Sitting BP: 113/66 HR: 60  Standing BP: 113/64 HR: 67  Site: --  Mode: --  Orthostatic VS  06-27-24 @ 19:28  Lying BP: --/-- HR: --  Sitting BP: 173/85 HR: 65  Standing BP: 151/87 HR: 71  Site: --  Mode: --  Orthostatic VS  06-27-24 @ 07:53  Lying BP: --/-- HR: --  Sitting BP: 124/79 HR: 63  Standing BP: 134/67 HR: 74  Site: --  Mode: --  
Vital Signs Last 24 Hrs  T(C): 36.7 (06-29-24 @ 07:58), Max: 36.7 (06-28-24 @ 19:34)  T(F): 98.1 (06-29-24 @ 07:58), Max: 98.1 (06-29-24 @ 07:58)  HR: --  BP: --  BP(mean): --  RR: 18 (06-29-24 @ 07:11) (18 - 18)  SpO2: --    Orthostatic VS  06-29-24 @ 07:58  Lying BP: --/-- HR: --  Sitting BP: 144/75 HR: 60  Standing BP: 138/68 HR: 70  Site: upper left arm  Mode: electronic  Orthostatic VS  06-28-24 @ 19:34  Lying BP: --/-- HR: --  Sitting BP: 153/81 HR: 61  Standing BP: 133/79 HR: 66  Site: --  Mode: --  Orthostatic VS  06-28-24 @ 07:48  Lying BP: --/-- HR: --  Sitting BP: 136/67 HR: 67  Standing BP: 119/67 HR: 62  Site: --  Mode: --  Orthostatic VS  06-27-24 @ 22:29  Lying BP: --/-- HR: --  Sitting BP: 113/66 HR: 60  Standing BP: 113/64 HR: 67  Site: --  Mode: --  Orthostatic VS  06-27-24 @ 19:28  Lying BP: --/-- HR: --  Sitting BP: 173/85 HR: 65  Standing BP: 151/87 HR: 71  Site: --  Mode: --  
Vital Signs Last 24 Hrs  T(C): 36.8 (07-02-24 @ 07:54), Max: 36.8 (07-02-24 @ 07:54)  T(F): 98.3 (07-02-24 @ 07:54), Max: 98.3 (07-02-24 @ 07:54)  HR: --  BP: --  BP(mean): --  RR: 16 (07-02-24 @ 07:54) (16 - 16)  SpO2: --    Orthostatic VS  07-02-24 @ 07:54  Lying BP: --/-- HR: --  Sitting BP: 123/70 HR: 65  Standing BP: 120/62 HR: 79  Site: --  Mode: --  Orthostatic VS  07-01-24 @ 19:29  Lying BP: --/-- HR: --  Sitting BP: 123/76 HR: 66  Standing BP: 117/67 HR: 68  Site: --  Mode: --  Orthostatic VS  07-01-24 @ 12:20  Lying BP: --/-- HR: --  Sitting BP: 132/70 HR: 61  Standing BP: 117/59 HR: 65  Site: --  Mode: --  Orthostatic VS  07-01-24 @ 07:53  Lying BP: --/-- HR: --  Sitting BP: 141/71 HR: 68  Standing BP: 139/68 HR: 71  Site: upper left arm  Mode: electronic  Orthostatic VS  06-30-24 @ 19:15  Lying BP: --/-- HR: --  Sitting BP: 128/68 HR: 56  Standing BP: 126/74 HR: 60  Site: --  Mode: --

## 2024-07-11 NOTE — BH INPATIENT PSYCHIATRY PROGRESS NOTE - PRN MEDS
MEDICATIONS  (PRN):  dextrose Oral Gel 15 Gram(s) Oral once PRN Blood Glucose LESS THAN 70 milliGRAM(s)/deciliter  haloperidol     Tablet 5 milliGRAM(s) Oral every 6 hours PRN agitation  haloperidol    Injectable 5 milliGRAM(s) IntraMuscular Once PRN agitation  hydrOXYzine hydrochloride 25 milliGRAM(s) Oral every 6 hours PRN Anxiety  LORazepam     Tablet 2 milliGRAM(s) Oral every 6 hours PRN Agitation  LORazepam   Injectable 2 milliGRAM(s) IntraMuscular Once PRN Agitation  
MEDICATIONS  (PRN):  dextrose Oral Gel 15 Gram(s) Oral once PRN Blood Glucose LESS THAN 70 milliGRAM(s)/deciliter  haloperidol     Tablet 5 milliGRAM(s) Oral every 6 hours PRN agitation  haloperidol    Injectable 5 milliGRAM(s) IntraMuscular Once PRN agitation  hydrOXYzine hydrochloride 50 milliGRAM(s) Oral every 6 hours PRN anxiety  polyethylene glycol 3350 17 Gram(s) Oral daily PRN constipation  
MEDICATIONS  (PRN):  dextrose Oral Gel 15 Gram(s) Oral once PRN Blood Glucose LESS THAN 70 milliGRAM(s)/deciliter  haloperidol     Tablet 5 milliGRAM(s) Oral every 6 hours PRN agitation  haloperidol    Injectable 5 milliGRAM(s) IntraMuscular Once PRN agitation  hydrOXYzine hydrochloride 25 milliGRAM(s) Oral every 6 hours PRN Anxiety  LORazepam     Tablet 2 milliGRAM(s) Oral every 6 hours PRN Agitation  LORazepam   Injectable 2 milliGRAM(s) IntraMuscular Once PRN Agitation  polyethylene glycol 3350 17 Gram(s) Oral daily PRN constipation  
MEDICATIONS  (PRN):  dextrose Oral Gel 15 Gram(s) Oral once PRN Blood Glucose LESS THAN 70 milliGRAM(s)/deciliter  haloperidol     Tablet 5 milliGRAM(s) Oral every 6 hours PRN agitation  haloperidol    Injectable 5 milliGRAM(s) IntraMuscular Once PRN agitation  LORazepam     Tablet 2 milliGRAM(s) Oral every 6 hours PRN Agitation  LORazepam   Injectable 2 milliGRAM(s) IntraMuscular Once PRN Agitation  
MEDICATIONS  (PRN):  dextrose Oral Gel 15 Gram(s) Oral once PRN Blood Glucose LESS THAN 70 milliGRAM(s)/deciliter  haloperidol     Tablet 5 milliGRAM(s) Oral every 6 hours PRN agitation  haloperidol    Injectable 5 milliGRAM(s) IntraMuscular Once PRN agitation  hydrOXYzine hydrochloride 25 milliGRAM(s) Oral every 6 hours PRN Anxiety  LORazepam     Tablet 2 milliGRAM(s) Oral every 6 hours PRN Agitation  LORazepam   Injectable 2 milliGRAM(s) IntraMuscular Once PRN Agitation  polyethylene glycol 3350 17 Gram(s) Oral daily PRN constipation  
MEDICATIONS  (PRN):  dextrose Oral Gel 15 Gram(s) Oral once PRN Blood Glucose LESS THAN 70 milliGRAM(s)/deciliter  haloperidol     Tablet 5 milliGRAM(s) Oral every 6 hours PRN agitation  haloperidol    Injectable 5 milliGRAM(s) IntraMuscular Once PRN agitation  LORazepam     Tablet 2 milliGRAM(s) Oral every 6 hours PRN Agitation  LORazepam   Injectable 2 milliGRAM(s) IntraMuscular Once PRN Agitation  
MEDICATIONS  (PRN):  dextrose Oral Gel 15 Gram(s) Oral once PRN Blood Glucose LESS THAN 70 milliGRAM(s)/deciliter  haloperidol     Tablet 5 milliGRAM(s) Oral every 6 hours PRN agitation  haloperidol    Injectable 5 milliGRAM(s) IntraMuscular Once PRN agitation  hydrOXYzine hydrochloride 25 milliGRAM(s) Oral every 6 hours PRN Anxiety  LORazepam     Tablet 2 milliGRAM(s) Oral every 6 hours PRN Agitation  LORazepam   Injectable 2 milliGRAM(s) IntraMuscular Once PRN Agitation  
MEDICATIONS  (PRN):  dextrose Oral Gel 15 Gram(s) Oral once PRN Blood Glucose LESS THAN 70 milliGRAM(s)/deciliter  haloperidol     Tablet 5 milliGRAM(s) Oral every 6 hours PRN agitation  haloperidol    Injectable 5 milliGRAM(s) IntraMuscular Once PRN agitation  hydrOXYzine hydrochloride 50 milliGRAM(s) Oral every 6 hours PRN anxiety  polyethylene glycol 3350 17 Gram(s) Oral daily PRN constipation  
MEDICATIONS  (PRN):  dextrose Oral Gel 15 Gram(s) Oral once PRN Blood Glucose LESS THAN 70 milliGRAM(s)/deciliter  haloperidol     Tablet 5 milliGRAM(s) Oral every 6 hours PRN agitation  haloperidol    Injectable 5 milliGRAM(s) IntraMuscular Once PRN agitation  LORazepam     Tablet 2 milliGRAM(s) Oral every 6 hours PRN Agitation  LORazepam   Injectable 2 milliGRAM(s) IntraMuscular Once PRN Agitation  
MEDICATIONS  (PRN):  dextrose Oral Gel 15 Gram(s) Oral once PRN Blood Glucose LESS THAN 70 milliGRAM(s)/deciliter  haloperidol     Tablet 5 milliGRAM(s) Oral every 6 hours PRN agitation  haloperidol    Injectable 5 milliGRAM(s) IntraMuscular Once PRN agitation  hydrOXYzine hydrochloride 25 milliGRAM(s) Oral every 6 hours PRN Anxiety  LORazepam     Tablet 2 milliGRAM(s) Oral every 6 hours PRN Agitation  LORazepam   Injectable 2 milliGRAM(s) IntraMuscular Once PRN Agitation  
MEDICATIONS  (PRN):  dextrose Oral Gel 15 Gram(s) Oral once PRN Blood Glucose LESS THAN 70 milliGRAM(s)/deciliter  haloperidol     Tablet 5 milliGRAM(s) Oral every 6 hours PRN agitation  haloperidol    Injectable 5 milliGRAM(s) IntraMuscular Once PRN agitation  LORazepam     Tablet 2 milliGRAM(s) Oral every 6 hours PRN Agitation  LORazepam   Injectable 2 milliGRAM(s) IntraMuscular Once PRN Agitation  
MEDICATIONS  (PRN):  dextrose Oral Gel 15 Gram(s) Oral once PRN Blood Glucose LESS THAN 70 milliGRAM(s)/deciliter  haloperidol     Tablet 5 milliGRAM(s) Oral every 6 hours PRN agitation  haloperidol    Injectable 5 milliGRAM(s) IntraMuscular Once PRN agitation  hydrOXYzine hydrochloride 50 milliGRAM(s) Oral every 6 hours PRN anxiety  polyethylene glycol 3350 17 Gram(s) Oral daily PRN constipation  
MEDICATIONS  (PRN):  dextrose Oral Gel 15 Gram(s) Oral once PRN Blood Glucose LESS THAN 70 milliGRAM(s)/deciliter  haloperidol     Tablet 5 milliGRAM(s) Oral every 6 hours PRN agitation  haloperidol    Injectable 5 milliGRAM(s) IntraMuscular Once PRN agitation  hydrOXYzine hydrochloride 25 milliGRAM(s) Oral every 6 hours PRN Anxiety  LORazepam     Tablet 2 milliGRAM(s) Oral every 6 hours PRN Agitation  LORazepam   Injectable 2 milliGRAM(s) IntraMuscular Once PRN Agitation  
MEDICATIONS  (PRN):  dextrose Oral Gel 15 Gram(s) Oral once PRN Blood Glucose LESS THAN 70 milliGRAM(s)/deciliter  haloperidol     Tablet 5 milliGRAM(s) Oral every 6 hours PRN agitation  haloperidol    Injectable 5 milliGRAM(s) IntraMuscular Once PRN agitation  hydrOXYzine hydrochloride 50 milliGRAM(s) Oral every 6 hours PRN anxiety  polyethylene glycol 3350 17 Gram(s) Oral daily PRN constipation  
MEDICATIONS  (PRN):  dextrose Oral Gel 15 Gram(s) Oral once PRN Blood Glucose LESS THAN 70 milliGRAM(s)/deciliter  haloperidol     Tablet 5 milliGRAM(s) Oral every 6 hours PRN agitation  haloperidol    Injectable 5 milliGRAM(s) IntraMuscular Once PRN agitation  hydrOXYzine hydrochloride 25 milliGRAM(s) Oral every 6 hours PRN Anxiety  LORazepam     Tablet 2 milliGRAM(s) Oral every 6 hours PRN Agitation  LORazepam   Injectable 2 milliGRAM(s) IntraMuscular Once PRN Agitation  polyethylene glycol 3350 17 Gram(s) Oral daily PRN constipation

## 2024-07-11 NOTE — BH INPATIENT PSYCHIATRY PROGRESS NOTE - NSBHMSEMOOD_PSY_A_CORE
"sad" "worried"/Normal
"sad" "worried"/Anxious
"sad" "worried"/Anxious
"sad" "worried"/Depressed
"sad" "worried"/Depressed
"sad" "worried"/Normal/Other
"sad" "worried"/Depressed
"sad" "worried"/Depressed
"sad" "worried"/Normal/Other
"sad" "worried"/Depressed
"sad" "worried"/Depressed/Anxious
"sad" "worried"/Anxious
No
"sad" "worried"/Depressed
"sad" "worried"/Depressed
"sad" "worried"/Anxious

## 2024-07-11 NOTE — BH INPATIENT PSYCHIATRY PROGRESS NOTE - NSBHFUPINTERVALCCFT_PSY_A_CORE
reports feeling "good". 
reports feeling down today 
"the voices were worse at my residence" 
"the voices were worse at my residence" 
reports feeling down today 
reports feeling "good". 
"the voices were worse at my residence" 
reports feeling down today 
"the voices were worse at my residence" 
"I feel great". 
"the voices were worse at my residence" 
reports feeling down today 

## 2024-07-11 NOTE — BH INPATIENT PSYCHIATRY PROGRESS NOTE - NSBHMSETHTPROC_PSY_A_CORE
Other
Linear/Impaired reasoning/Other
Other
Linear/Impaired reasoning/Other
Linear/Impaired reasoning/Other
Other
Other
Linear/Impaired reasoning/Other
Other
Linear/Impaired reasoning/Other
Other
Other

## 2024-07-12 VITALS — RESPIRATION RATE: 18 BRPM | TEMPERATURE: 98 F

## 2024-07-12 LAB
GLUCOSE BLDC GLUCOMTR-MCNC: 107 MG/DL — HIGH (ref 70–99)
GLUCOSE BLDC GLUCOMTR-MCNC: 115 MG/DL — HIGH (ref 70–99)

## 2024-07-12 PROCEDURE — 99238 HOSP IP/OBS DSCHRG MGMT 30/<: CPT

## 2024-07-12 RX ORDER — LOSARTAN POTASSIUM 100 MG/1
1 TABLET, FILM COATED ORAL
Qty: 30 | Refills: 0
Start: 2024-07-12 | End: 2024-08-10

## 2024-07-12 RX ORDER — LEVOTHYROXINE SODIUM 25 MCG
1 TABLET ORAL
Qty: 30 | Refills: 0
Start: 2024-07-12 | End: 2024-08-10

## 2024-07-12 RX ORDER — SULFAMETHOXAZOLE AND TRIMETHOPRIM 800; 160 MG/1; MG/1
1 TABLET ORAL
Refills: 0 | Status: DISCONTINUED | OUTPATIENT
Start: 2024-07-12 | End: 2024-07-12

## 2024-07-12 RX ORDER — ATORVASTATIN CALCIUM 20 MG/1
1 TABLET, FILM COATED ORAL
Qty: 30 | Refills: 0
Start: 2024-07-12 | End: 2024-08-10

## 2024-07-12 RX ORDER — AMLODIPINE BESYLATE 2.5 MG/1
1 TABLET ORAL
Qty: 30 | Refills: 0
Start: 2024-07-12 | End: 2024-08-10

## 2024-07-12 RX ORDER — SULFAMETHOXAZOLE AND TRIMETHOPRIM 800; 160 MG/1; MG/1
1 TABLET ORAL ONCE
Refills: 0 | Status: COMPLETED | OUTPATIENT
Start: 2024-07-12 | End: 2024-07-12

## 2024-07-12 RX ORDER — HALOPERIDOL DECANOATE 100 MG/ML
1 VIAL (ML) INTRAMUSCULAR
Qty: 60 | Refills: 0
Start: 2024-07-12 | End: 2024-08-10

## 2024-07-12 RX ORDER — METFORMIN HYDROCHLORIDE 850 MG/1
1 TABLET, FILM COATED ORAL
Qty: 60 | Refills: 0
Start: 2024-07-12 | End: 2024-08-10

## 2024-07-12 RX ORDER — SULFAMETHOXAZOLE AND TRIMETHOPRIM 800; 160 MG/1; MG/1
1 TABLET ORAL
Qty: 14 | Refills: 0
Start: 2024-07-12 | End: 2024-07-18

## 2024-07-12 RX ORDER — LABETALOL HYDROCHLORIDE 300 MG/1
1 TABLET ORAL
Qty: 60 | Refills: 0
Start: 2024-07-12 | End: 2024-08-10

## 2024-07-12 RX ADMIN — LOSARTAN POTASSIUM 100 MILLIGRAM(S): 100 TABLET, FILM COATED ORAL at 08:34

## 2024-07-12 RX ADMIN — NICOTINE POLACRILEX 1 PATCH: 2 LOZENGE ORAL at 07:45

## 2024-07-12 RX ADMIN — SULFAMETHOXAZOLE AND TRIMETHOPRIM 1 TABLET(S): 800; 160 TABLET ORAL at 01:07

## 2024-07-12 RX ADMIN — NICOTINE POLACRILEX 1 PATCH: 2 LOZENGE ORAL at 08:35

## 2024-07-12 RX ADMIN — Medication 10 MILLIGRAM(S): at 08:36

## 2024-07-12 RX ADMIN — LABETALOL HYDROCHLORIDE 200 MILLIGRAM(S): 300 TABLET ORAL at 08:35

## 2024-07-12 RX ADMIN — SULFAMETHOXAZOLE AND TRIMETHOPRIM 1 TABLET(S): 800; 160 TABLET ORAL at 08:34

## 2024-07-12 RX ADMIN — AMLODIPINE BESYLATE 5 MILLIGRAM(S): 2.5 TABLET ORAL at 08:35

## 2024-07-12 RX ADMIN — HYDROXYZINE PAMOATE 50 MILLIGRAM(S): 50 CAPSULE ORAL at 10:49

## 2024-07-12 RX ADMIN — Medication 50 MICROGRAM(S): at 06:49

## 2024-07-12 RX ADMIN — METFORMIN HYDROCHLORIDE 500 MILLIGRAM(S): 850 TABLET, FILM COATED ORAL at 08:36

## 2024-07-12 NOTE — BH INPATIENT PSYCHIATRY DISCHARGE NOTE - NSBHMETABOLIC_PSY_ALL_CORE_FT
BMI: BMI (kg/m2): 32.6 (07-11-24 @ 20:59)  HbA1c: A1C with Estimated Average Glucose Result: 5.7 % (05-26-24 @ 06:00)    Glucose: POCT Blood Glucose.: 107 mg/dL (07-12-24 @ 11:26)    BP: --Vital Signs Last 24 Hrs  T(C): 36.4 (07-12-24 @ 08:07), Max: 36.6 (07-11-24 @ 20:59)  T(F): 97.6 (07-12-24 @ 08:07), Max: 97.8 (07-11-24 @ 20:59)  HR: 64 (07-11-24 @ 20:59) (64 - 64)  BP: 143/78 (07-11-24 @ 20:59) (143/78 - 143/78)  BP(mean): --  RR: 18 (07-12-24 @ 08:07) (16 - 18)  SpO2: 100% (07-11-24 @ 20:59) (100% - 100%)    Orthostatic VS  07-12-24 @ 08:07  Lying BP: --/-- HR: --  Sitting BP: 140/78 HR: 76  Standing BP: 131/70 HR: 88  Site: --  Mode: --  Orthostatic VS  07-11-24 @ 19:20  Lying BP: --/-- HR: --  Sitting BP: 142/74 HR: 67  Standing BP: 125/73 HR: 71  Site: --  Mode: --  Orthostatic VS  07-11-24 @ 07:55  Lying BP: --/-- HR: --  Sitting BP: 120/71 HR: 64  Standing BP: 108/60 HR: 71  Site: --  Mode: electronic  Orthostatic VS  07-10-24 @ 18:51  Lying BP: --/-- HR: --  Sitting BP: 128/61 HR: 59  Standing BP: 115/65 HR: 68  Site: --  Mode: electronic    Lipid Panel: Date/Time: 03-09-24 @ 09:43  Cholesterol, Serum: 187  LDL Cholesterol Calculated: 123  HDL Cholesterol, Serum: 37  Total Cholesterol/HDL Ration Measurement: --  Triglycerides, Serum: 135

## 2024-07-12 NOTE — BH INPATIENT PSYCHIATRY DISCHARGE NOTE - NSBHASSESSSUMMFT_PSY_ALL_CORE
Patient is a 58 y o male, domiciled in supportive housing [place], on SSD, single, unemployed, w/ PMHx HTN, DM2 (last HBa1c from 5/2024 was 5.7), hypogammaglobulinemia (for which he receives monthly infusions; reports last was yesterday), w/ PPHx schizoaffective d/o, hx of multiple inpatient psychiatric hospitalizations (last known in 2024 at [facility] due to hyponatremia on medical clearance labs), follows outpatient at [facility] w/ Dr Cook (last visit on 6/19/24; on Abioz BELLA, reports next due 7/1/24),  denies hx SA/NSSIB (however per chart review one collateral note cites sister reporting remote SA), denies drug or alcohol use, w/ exception of tobacco, denies legal hx, self presented to the ED for suicidal ideations.   On  initially evaluation, patient presents with auditory hallucinations, paranoid persecutory delusions, with suicidal ideation and plan if discharged back home, hopelessness/guilt/worthlessness and is s/p aborted suicide attempt in the community prior to arrival to the ED.  At this time, patient is unable to contract for safety in the community and is requesting and meets criteria for voluntary inpatient psychiatric admission for safety, stabilization and medication optimization (agreeable to further Haldol increase). Pts presentation consistent with decompensated schizophrenia.  denies any SI or depressive sx currently. Reports AH/delusions that are much improved inside hospital and exacerbated at this residence.      Patient initially noted to be with Na of 130, which as per primary team appears to be his baseline.  As per primary team, no nephrology consult indicated nor salt tabs and recommended fluid restriction of 1.5L/ day once on inpatient unit. Repeat was requested to ensure not downtrending and follow up Na was 135; as per primary team, patient is medically cleared and no further intervention indicated at this time.     On the unit, patient reported SA by OD, denies any SI or depressive sx currently. Reports AH/delusions that are much improved inside hospital and exacerbated at this residence.    Recommendations:   -admit on 9.13 legal status  -hx of hyponatremia on initial lab draw / polydipsia as per chart review: patient's repeat Na WNL and as per primary team nothing to do other than fluid restriction of 1.5L/day on unit; f/u Na 131 on 6/23, repeat Na 133 on 6/28  -titrate Haldol to 10 mg PO qAM and 10mg PO qhs (increased to 5mg BID on 6/19 as per outpatient note) - given Haldol Decanoate 100 mg IM on 7/5 and next loading dose 100 mg on 7/10. Will receive maintenance dose of Haldol Decanoate 200 mg IM on approximately 8/8, with 4 weeks of oral supplementation.   -confirm patient's last Abilify BELLA in face due 7/1 and that last IVIG given q monthly (reports last given on 6/20)  -HTN: continue amLODIPine 5 mg oral tablet: 1 tab(s) orally once a day, labetalol 200 mg oral tablet: 1 tab(s) orally 2 times a day, losartan 100 mg oral tablet: 1 tab(s) orally once a day  -HLD: continue atorvastatin 40 mg oral tablet: 1 tab(s) orally once a day (at bedtime)  -Hypothyroid: continue levothyroxine 50 mcg (0.05 mg) oral tablet: 1 tab(s) orally once a day  -Tobacco Use Disorder: NRT  -DM: continue metFORMIN 500 mg oral tablet: 1 tab(s) orally 2 times a day and insulin sliding scale  -For agitation please attempt verbal de-escalation and behavioral intervention first. If patient does not respond to above, may give haldol 5 mg po q6h prn, ativan 2 mg po q6h prn if no contraindications. If patient is refusing PO, remains an imminent danger to self or others and If Patient's  qtc <500, can escalate to IM formulation. If IM antipsychotic is administered, please perform follow-up ECG for QTc monitoring.   Patient is a 58 y o male, domiciled in supportive housing [place], on SSD, single, unemployed, w/ PMHx HTN, DM2 (last HBa1c from 5/2024 was 5.7), hypogammaglobulinemia (for which he receives monthly infusions; reports last was yesterday), w/ PPHx schizoaffective d/o, hx of multiple inpatient psychiatric hospitalizations (last known in 2024 at [facility] due to hyponatremia on medical clearance labs), follows outpatient at [facility] w/ Dr Cook (last visit on 6/19/24; on Abilify BELLA, reports next due 7/1/24),  denies hx SA/NSSIB (however per chart review one collateral note cites sister reporting remote SA), denies drug or alcohol use, w/ exception of tobacco, denies legal hx, self presented to the ED for suicidal ideations.   On  initially evaluation, patient presents with auditory hallucinations, paranoid persecutory delusions, with suicidal ideation and plan if discharged back home, hopelessness/guilt/worthlessness and is s/p aborted suicide attempt in the community prior to arrival to the ED.  At this time, patient is unable to contract for safety in the community and is requesting and meets criteria for voluntary inpatient psychiatric admission for safety, stabilization and medication optimization (agreeable to further Haldol increase). Pts presentation consistent with decompensated schizophrenia.   Pt was switched from Abilify to Haldol, titrated to Haldol BELLA with a total dose of 200 mg IM. Pt on his medication regimen showed much improvement in paranoia, with almost resolution of AH. Pt denies active or passive SI, reports being highly future oriented and motivated for increased outpatient treatment and resuming individual psychotherapy (placed on wait list) as well as increasingly involvement at [employer] and wishes to volunteer.       Patient initially noted to be with Na of 130, which as per primary team appears to be his baseline.  As per primary team, no nephrology consult indicated nor salt tabs and recommended fluid restriction of 1.5L/ day once on inpatient unit. Repeat was requested to ensure not downtrending and follow up Na was 135; as per primary team, patient is medically cleared and no further intervention indicated at this time.     On the unit, patient reported SA by OD, denies any SI or depressive sx currently. Reports AH/delusions that are much improved inside hospital and exacerbated at this residence.    Recommendations:   -admit on 9.13 legal status  -hx of hyponatremia on initial lab draw / polydipsia as per chart review: patient's repeat Na WNL and as per primary team nothing to do other than fluid restriction of 1.5L/day on unit; f/u Na 131 on 6/23, repeat Na 133 on 6/28  -titrate Haldol to 10 mg PO qAM and 10mg PO qhs (increased to 5mg BID on 6/19 as per outpatient note) - given Haldol Decanoate 100 mg IM on 7/5 and next loading dose 100 mg on 7/10. Will receive maintenance dose of Haldol Decanoate 200 mg IM on approximately 8/8, with 4 weeks of oral supplementation.   -confirm patient's last Abilify BELLA in face due 7/1 and that last IVIG given q monthly (reports last given on 6/20)  -HTN: continue amLODIPine 5 mg oral tablet: 1 tab(s) orally once a day, labetalol 200 mg oral tablet: 1 tab(s) orally 2 times a day, losartan 100 mg oral tablet: 1 tab(s) orally once a day  -HLD: continue atorvastatin 40 mg oral tablet: 1 tab(s) orally once a day (at bedtime)  -Hypothyroid: continue levothyroxine 50 mcg (0.05 mg) oral tablet: 1 tab(s) orally once a day  -Tobacco Use Disorder: NRT  -DM: continue metFORMIN 500 mg oral tablet: 1 tab(s) orally 2 times a day and insulin sliding scale  -For agitation please attempt verbal de-escalation and behavioral intervention first. If patient does not respond to above, may give haldol 5 mg po q6h prn, ativan 2 mg po q6h prn if no contraindications. If patient is refusing PO, remains an imminent danger to self or others and If Patient's  qtc <500, can escalate to IM formulation. If IM antipsychotic is administered, please perform follow-up ECG for QTc monitoring.
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
## 2024-07-12 NOTE — BH INPATIENT PSYCHIATRY DISCHARGE NOTE - ATTENDING DISCHARGE PHYSICAL EXAMINATION:
I have personally seen and evaluated patient prior to discharge. Chart reviewed and discharge plan discussed with the treatment team as follows. Pt prior to discharge was calm, cooperative, friendly and smiling. Patient has been attending groups with active participation.  Pt interacting well with others. No recent behavioral problems and no episodes of aggressive or dangerous behavior. No problems with sleep, appetite or energy level. Denied any active and current manic, hypomanic, depressive sxs. Pts psychotic and anxiety symptoms have greatly attenuated. Denied any current SI/HI/AH/VH/PI. Pt with chronic delusions at baseline, however have become less overt and intense during stay.  Patient is organized and commits to safety and to ongoing outpatient treatment.   Pt asks relevant questions about his discharge plan and about the medication regimen. Pt articulate a plan for living life after discharge. Medication risks/benefits/side effects were discussed with the patient.  Patient expressed understanding and agreed with the treatment plan. Further, instructed the patient to seek help immediately by dialing "911" or by going to the nearest ER if symptoms worsen.   Chronic risk factors include hx of schizophrenia, recent aborted SA prior to admission  Protective factors include domiciled, p supportive friends/family network, compliant with psychiatric medications, motivated to engage in outpateint psychiatric treatment, future oriented thinking, expresses concern for well being, much aspirations for return to return to prior baseline functioning, volunteer in the community and wish to pursue pleasurable activities.  As such, its chronic risk factors are mitigated by their protective factors. Pt does not pose an acute elevated risk of imminent danger to harm to self or others. Does not require further inpatient psychiatric admisison at this time. Psychiatrically cleared for discharge.   Pt urged to avoid using any alcohol or street drugs, particularly marijuana & K2, cocaine and methamphetamine (crystal meth) once discharged.  Safety plan filled out by patient and submitted into chart.

## 2024-07-12 NOTE — BH INPATIENT PSYCHIATRY DISCHARGE NOTE - NSBHDCMEDICALFT_PSY_A_CORE
Pt on the day prior to discharge c/o L hip pain that pt believed was a boil, this was evaluated and found to be a abscess, pt sent to ED medically drained it and cleared to be sent back to [facility] to complete 7 day course of Abx (prescribed pt with 7 day course of Bactrim DS upon discharge)

## 2024-07-12 NOTE — BH INPATIENT PSYCHIATRY DISCHARGE NOTE - NSBHSUICIDESTATUS_PSY_ALL_CORE
Chronic risk factors include hx of schizophrenia, recent aborted SA prior to admission  Protective factors include domiciled, p supportive friends/family network, compliant with psychiatric medications, motivated to engage in outpateint psychiatric treatment, future oriented thinking, expresses concern for well being, much aspirations for return to return to prior baseline functioning, volunteer in the community and wish to pursue pleasurable activities.  As such, its chronic risk factors are mitigated by their protective factors. Pt does not pose an acute elevated risk of imminent danger to harm to self or others. Does not require further inpatient psychiatric admisison at this time. Psychiatrically cleared for discharge.
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
## 2024-07-12 NOTE — BH INPATIENT PSYCHIATRY DISCHARGE NOTE - NSDCMRMEDTOKEN_GEN_ALL_CORE_FT
amLODIPine 5 mg oral tablet: 1 tab(s) orally once a day  atorvastatin 40 mg oral tablet: 1 tab(s) orally once a day (at bedtime)  Bactrim  mg-160 mg oral tablet: 1 tab(s) orally 2 times a day  haloperidol 10 mg oral tablet: 1 tab(s) orally 2 times a day  labetalol 200 mg oral tablet: 1 tab(s) orally 2 times a day  levothyroxine 50 mcg (0.05 mg) oral tablet: 1 tab(s) orally once a day  losartan 100 mg oral tablet: 1 tab(s) orally once a day  metFORMIN 500 mg oral tablet: 1 tab(s) orally 2 times a day

## 2024-07-15 ENCOUNTER — APPOINTMENT (OUTPATIENT)
Dept: ALLERGY | Facility: CLINIC | Age: 59
End: 2024-07-15

## 2024-07-18 ENCOUNTER — APPOINTMENT (OUTPATIENT)
Dept: RHEUMATOLOGY | Facility: CLINIC | Age: 59
End: 2024-07-18

## 2024-07-26 ENCOUNTER — TRANSCRIPTION ENCOUNTER (OUTPATIENT)
Age: 59
End: 2024-07-26

## 2024-07-26 ENCOUNTER — INPATIENT (INPATIENT)
Facility: HOSPITAL | Age: 59
LOS: 32 days | Discharge: HOME HEALTH | End: 2024-08-28
Attending: STUDENT IN AN ORGANIZED HEALTH CARE EDUCATION/TRAINING PROGRAM | Admitting: PSYCHIATRY & NEUROLOGY
Payer: MEDICARE

## 2024-07-26 VITALS — TEMPERATURE: 98 F | WEIGHT: 259.93 LBS

## 2024-07-26 DIAGNOSIS — J34.2 DEVIATED NASAL SEPTUM: Chronic | ICD-10-CM

## 2024-07-26 DIAGNOSIS — K08.199 COMPLETE LOSS OF TEETH DUE TO OTHER SPECIFIED CAUSE, UNSPECIFIED CLASS: Chronic | ICD-10-CM

## 2024-07-26 DIAGNOSIS — F25.9 SCHIZOAFFECTIVE DISORDER, UNSPECIFIED: ICD-10-CM

## 2024-07-26 PROCEDURE — 99222 1ST HOSP IP/OBS MODERATE 55: CPT

## 2024-07-26 RX ORDER — ACETAMINOPHEN 325 MG/1
650 TABLET ORAL EVERY 6 HOURS
Refills: 0 | Status: DISCONTINUED | OUTPATIENT
Start: 2024-07-26 | End: 2024-08-28

## 2024-07-26 RX ORDER — DEXTROSE 15 G/33 G
25 GEL IN PACKET (GRAM) ORAL ONCE
Refills: 0 | Status: DISCONTINUED | OUTPATIENT
Start: 2024-07-26 | End: 2024-08-12

## 2024-07-26 RX ORDER — TAMSULOSIN HYDROCHLORIDE 0.4 MG/1
0.4 CAPSULE ORAL AT BEDTIME
Refills: 0 | Status: DISCONTINUED | OUTPATIENT
Start: 2024-07-26 | End: 2024-08-28

## 2024-07-26 RX ORDER — HALOPERIDOL 1 MG
5 TABLET ORAL DAILY
Refills: 0 | Status: DISCONTINUED | OUTPATIENT
Start: 2024-07-26 | End: 2024-08-02

## 2024-07-26 RX ORDER — DEXTROSE 15 G/33 G
15 GEL IN PACKET (GRAM) ORAL ONCE
Refills: 0 | Status: DISCONTINUED | OUTPATIENT
Start: 2024-07-26 | End: 2024-08-12

## 2024-07-26 RX ORDER — TRAZODONE HCL 50 MG
50 TABLET ORAL AT BEDTIME
Refills: 0 | Status: DISCONTINUED | OUTPATIENT
Start: 2024-07-26 | End: 2024-08-28

## 2024-07-26 RX ORDER — DOXYCYCLINE MONOHYDRATE 100 MG
100 TABLET ORAL EVERY 12 HOURS
Refills: 0 | Status: DISCONTINUED | OUTPATIENT
Start: 2024-07-26 | End: 2024-07-26

## 2024-07-26 RX ORDER — DEXTROSE 15 G/33 G
12.5 GEL IN PACKET (GRAM) ORAL ONCE
Refills: 0 | Status: DISCONTINUED | OUTPATIENT
Start: 2024-07-26 | End: 2024-08-12

## 2024-07-26 RX ORDER — DOXYCYCLINE MONOHYDRATE 100 MG
100 TABLET ORAL EVERY 12 HOURS
Refills: 0 | Status: COMPLETED | OUTPATIENT
Start: 2024-07-27 | End: 2024-07-28

## 2024-07-26 RX ORDER — LORAZEPAM 4 MG/ML
2 INJECTION INTRAMUSCULAR; INTRAVENOUS ONCE
Refills: 0 | Status: DISCONTINUED | OUTPATIENT
Start: 2024-07-26 | End: 2024-07-26

## 2024-07-26 RX ORDER — GLUCAGON INJECTION, SOLUTION 1 MG/.2ML
1 INJECTION, SOLUTION SUBCUTANEOUS ONCE
Refills: 0 | Status: DISCONTINUED | OUTPATIENT
Start: 2024-07-26 | End: 2024-08-28

## 2024-07-26 RX ORDER — AMLODIPINE BESYLATE 10 MG/1
10 TABLET ORAL DAILY
Refills: 0 | Status: DISCONTINUED | OUTPATIENT
Start: 2024-07-26 | End: 2024-08-28

## 2024-07-26 RX ORDER — LORAZEPAM 4 MG/ML
2 INJECTION INTRAMUSCULAR; INTRAVENOUS ONCE
Refills: 0 | Status: DISCONTINUED | OUTPATIENT
Start: 2024-07-26 | End: 2024-08-01

## 2024-07-26 RX ORDER — LEVOTHYROXINE SODIUM 100 MCG
50 TABLET ORAL DAILY
Refills: 0 | Status: DISCONTINUED | OUTPATIENT
Start: 2024-07-26 | End: 2024-08-28

## 2024-07-26 RX ORDER — CHLORPROMAZINE HCL 50 MG
50 TABLET ORAL ONCE
Refills: 0 | Status: DISCONTINUED | OUTPATIENT
Start: 2024-07-26 | End: 2024-08-28

## 2024-07-26 RX ORDER — LORAZEPAM 4 MG/ML
2 INJECTION INTRAMUSCULAR; INTRAVENOUS EVERY 6 HOURS
Refills: 0 | Status: DISCONTINUED | OUTPATIENT
Start: 2024-07-26 | End: 2024-08-01

## 2024-07-26 RX ORDER — MAGNESIUM, ALUMINUM HYDROXIDE 200-225/5
30 SUSPENSION, ORAL (FINAL DOSE FORM) ORAL EVERY 6 HOURS
Refills: 0 | Status: DISCONTINUED | OUTPATIENT
Start: 2024-07-26 | End: 2024-08-28

## 2024-07-26 RX ORDER — SODIUM CHLORIDE 9 MG/ML
1 INJECTION INTRAMUSCULAR; INTRAVENOUS; SUBCUTANEOUS THREE TIMES A DAY
Refills: 0 | Status: DISCONTINUED | OUTPATIENT
Start: 2024-07-26 | End: 2024-08-28

## 2024-07-26 RX ORDER — ESCITALOPRAM OXALATE 10 MG/1
5 TABLET ORAL DAILY
Refills: 0 | Status: DISCONTINUED | OUTPATIENT
Start: 2024-07-26 | End: 2024-07-31

## 2024-07-26 RX ADMIN — Medication 40 MILLIGRAM(S): at 22:30

## 2024-07-26 RX ADMIN — SODIUM CHLORIDE 1 GRAM(S): 9 INJECTION INTRAMUSCULAR; INTRAVENOUS; SUBCUTANEOUS at 21:18

## 2024-07-26 RX ADMIN — LORAZEPAM 2 MILLIGRAM(S): 4 INJECTION INTRAMUSCULAR; INTRAVENOUS at 21:16

## 2024-07-26 RX ADMIN — TAMSULOSIN HYDROCHLORIDE 0.4 MILLIGRAM(S): 0.4 CAPSULE ORAL at 21:16

## 2024-07-26 RX ADMIN — ACETAMINOPHEN 650 MILLIGRAM(S): 325 TABLET ORAL at 23:12

## 2024-07-26 RX ADMIN — Medication 200 MILLIGRAM(S): at 21:32

## 2024-07-26 RX ADMIN — ACETAMINOPHEN 650 MILLIGRAM(S): 325 TABLET ORAL at 22:29

## 2024-07-26 RX ADMIN — Medication 50 MILLIGRAM(S): at 22:29

## 2024-07-26 NOTE — BH INPATIENT PSYCHIATRY ASSESSMENT NOTE - NSBHCHARTREVIEWLAB_PSY_A_CORE FT
07-26    133<L>  |  96<L>  |  22  ----------------------------<  92  4.4   |  24  |  1.63<H>    Ca    9.4      26 Jul 2024 06:40  Phos  4.1     07-26  Mg     1.70     07-26

## 2024-07-26 NOTE — BH INPATIENT PSYCHIATRY ASSESSMENT NOTE - NSBHMETABOLIC_PSY_ALL_CORE_FT
BMI: BMI (kg/m2): 33.4 (07-23-24 @ 10:08)  HbA1c: A1C with Estimated Average Glucose Result: 5.8 % (07-24-24 @ 07:48)    Glucose: POCT Blood Glucose.: 101 mg/dL (07-26-24 @ 18:00)    BP: --Vital Signs Last 24 Hrs  T(C): 36.7 (07-26-24 @ 20:21), Max: 37.1 (07-26-24 @ 17:39)  T(F): 98.1 (07-26-24 @ 20:21), Max: 98.7 (07-26-24 @ 17:39)  HR: 64 (07-26-24 @ 17:39) (62 - 65)  BP: 135/80 (07-26-24 @ 17:39) (128/76 - 150/72)  BP(mean): --  RR: 18 (07-26-24 @ 17:39) (18 - 18)  SpO2: 97% (07-26-24 @ 17:39) (97% - 99%)    Orthostatic VS  07-26-24 @ 20:21  Lying BP: --/-- HR: 166  Sitting BP: 85/66 HR: 158  Standing BP: 76/68 HR: --  Site: --  Mode: --    Lipid Panel: Date/Time: 07-24-24 @ 07:48  Cholesterol, Serum: 81  LDL Cholesterol Calculated: 34  HDL Cholesterol, Serum: 30  Total Cholesterol/HDL Ration Measurement: --  Triglycerides, Serum: 84   BMI: BMI (kg/m2): 33.4 (07-23-24 @ 10:08)  HbA1c: A1C with Estimated Average Glucose Result: 5.8 % (07-24-24 @ 07:48)    Glucose: POCT Blood Glucose.: 101 mg/dL (07-26-24 @ 18:00)    BP: --Vital Signs Last 24 Hrs  T(C): 36.7 (07-26-24 @ 20:21), Max: 37.1 (07-26-24 @ 17:39)  T(F): 98.1 (07-26-24 @ 20:21), Max: 98.7 (07-26-24 @ 17:39)  HR: 64 (07-26-24 @ 17:39) (64 - 65)  BP: 135/80 (07-26-24 @ 17:39) (128/76 - 140/67)  BP(mean): --  RR: 18 (07-26-24 @ 17:39) (18 - 18)  SpO2: 97% (07-26-24 @ 17:39) (97% - 98%)    Orthostatic VS  07-26-24 @ 20:21  Lying BP: --/-- HR: 166  Sitting BP: 85/66 HR: 158  Standing BP: 76/68 HR: --  Site: --  Mode: --    Lipid Panel: Date/Time: 07-24-24 @ 07:48  Cholesterol, Serum: 81  LDL Cholesterol Calculated: 34  HDL Cholesterol, Serum: 30  Total Cholesterol/HDL Ration Measurement: --  Triglycerides, Serum: 84

## 2024-07-26 NOTE — SOCIAL WORK POST DISCHARGE FOLLOW UP NOTE - NSBHSWFOLLOWUP_PSY_ALL_CORE_FT
LMSW was informed that pt did not keep his scheduled 07/17 [facility] AOPD appt. LMSW called pt to offer assistance in re-scheduling the appointment. LMSW left a voicemail offering assistance and requesting a callback.

## 2024-07-26 NOTE — BH INPATIENT PSYCHIATRY ASSESSMENT NOTE - HPI (INCLUDE ILLNESS QUALITY, SEVERITY, DURATION, TIMING, CONTEXT, MODIFYING FACTORS, ASSOCIATED SIGNS AND SYMPTOMS)
TRANSFER FROM [facility] CL: 59 yo M with PPhx significant for schizoaffective disorder, history of multiple inpatient psychiatric hospitalizations (last hospitalized at [facility] from 6/22-7/12/24), follows outpatient at [facility] with Dr. Cook, and PMH significant for HTN, HLD, hypothyroidism, CVID/ hypogammaglobulinemia (on monthly IVIG - Last dose Jun 16th, 2024), COPD not on home O2, hx of frequent skin infection who presented as initially to [facility] for  overdose of labetalol, but was also found to have left leg abscess vs cellulitis and was transferred on 7/22/24 for IVIG infusions (received 7/24/24).  Psychiatry evaluated Patient: "... somewhat childlike, but overall pleasant and engaged. He recalls that he on the day of discharge from [facility], he started having paranoid thoughts about people wanting to kill him and that others were out to get him. States that the paranoia was so distressing that he immediately thought of ending his life and overdosed on numerous labetalol pills. He started to feel nauseated and realized that he does want to live and immediately called the ambulance where he was taken to [facility]. Patient expresses that the negative thoughts, paranoia, suicidal thoughts were difficult for him when he returned home from his psychiatric hospitalization because he has a hard time talking to others about it. He reports that he follows with Dr. Cook at [facility] on an outpatient basis, and has been doing well with individualized treatment with her." Per chart review, patient was switched from abilify to haldol during his last hospitalization. At discharge he was taking haldol 10 mg PO BID. He received haldol decanoate 100 mg IM on 7/5 and the next loading dose of 100 mg IM on 7/10. He will be due for maintenance dose of haldol decanoate 200 mg IM on 8/8/24. Patient reports that he recalls that he received 2 of the haldol shots and the plan was to continue treatment on outpatient basis.     COLLATERAL FROM PT'S SISTERS AS PER CL NOTE: "patient did well on haldol for several years but decompensated on the abilify. They report that when he was switched to abilify, he started having a resurgence and worsening of the paranoia and negative thoughts. They believe that patient needs more time on the haldol without the abilify. Furthermore, they relay that patient was also struggling with hyponatremia and the skin infections, which likely were also contributing to worsening in mental status at the time. "    AT [facility]:  Left lateral leg w/ pain and purulence. Immunocompromised given hx of CVID. Currently w/o systemic signs of infection. MRSA sputum and nares + in OSH. s/p Vanc --> transitioned to Bactrim (Started on 7/13), s/p course of vanco at [facility], c/w doxycycline through 7/28; need for MRI. pt also very claustrophobic. MRI order d/c. 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 Mitchell Boxer. s/p IVIG x1 at [facility] 07/24/24. TASH -  Elevated creatine 1.8, BUN 26. Creatine in June 2024. 0.7, drug induced at [facility] vs ATN from shock;  U/A relatively bland with mild spot proteinuria; renal/bladder U/S: no hydronephrosis. hold home ARB    ON L3: calm, cooperative, childlike, concrete, fully engages, no complaints, denies acute mood sxs, denies suicidality.

## 2024-07-26 NOTE — BH PATIENT PROFILE - FALL HARM RISK - UNIVERSAL INTERVENTIONS
Bed in lowest position, wheels locked, appropriate side rails in place/Call bell, personal items and telephone in reach/Instruct patient to call for assistance before getting out of bed or chair/Non-slip footwear when patient is out of bed/[place] to call system/Physically safe environment - no spills, clutter or unnecessary equipment/Purposeful Proactive Rounding/Room/bathroom lighting operational, light cord in reach

## 2024-07-26 NOTE — BH INPATIENT PSYCHIATRY ASSESSMENT NOTE - CURRENT MEDICATION
MEDICATIONS  (STANDING):  amLODIPine   Tablet 10 milliGRAM(s) Oral daily  atorvastatin 40 milliGRAM(s) Oral at bedtime  dextrose 5%. 1000 milliLiter(s) (50 mL/Hr) IV Continuous <Continuous>  dextrose 5%. 1000 milliLiter(s) (100 mL/Hr) IV Continuous <Continuous>  dextrose 50% Injectable 25 Gram(s) IV Push once  dextrose 50% Injectable 12.5 Gram(s) IV Push once  dextrose 50% Injectable 25 Gram(s) IV Push once  doxycycline monohydrate Capsule 100 milliGRAM(s) Oral every 12 hours  escitalopram 5 milliGRAM(s) Oral daily  glucagon  Injectable 1 milliGRAM(s) IntraMuscular once  haloperidol     Tablet 5 milliGRAM(s) Oral daily  insulin lispro (ADMELOG) corrective regimen sliding scale   SubCutaneous three times a day before meals  insulin lispro (ADMELOG) corrective regimen sliding scale   SubCutaneous at bedtime  labetalol 200 milliGRAM(s) Oral two times a day  levothyroxine 50 MICROGram(s) Oral daily  sodium chloride 1 Gram(s) Oral three times a day  tamsulosin 0.4 milliGRAM(s) Oral at bedtime    MEDICATIONS  (PRN):  acetaminophen     Tablet .. 650 milliGRAM(s) Oral every 6 hours PRN Temp greater or equal to 38C (100.4F), Mild Pain (1 - 3)  aluminum hydroxide/magnesium hydroxide/simethicone Suspension 30 milliLiter(s) Oral every 6 hours PRN Dyspepsia  dextrose Oral Gel 15 Gram(s) Oral once PRN Blood Glucose LESS THAN 70 milliGRAM(s)/deciliter  magnesium hydroxide Suspension 30 milliLiter(s) Oral daily PRN Constipation  melatonin. 3 milliGRAM(s) Oral at bedtime PRN Insomnia

## 2024-07-26 NOTE — BH INPATIENT PSYCHIATRY ASSESSMENT NOTE - NSBHCHARTREVIEWINVESTIGATE_PSY_A_CORE FT
T non con of Leg - skin thickening and subQ stranding in the outer margin of the left hip extending cranially beyond the field of view up to the level of femoral shaft w/o evidence of underlying fluid collection. No CT evidence of OM.   - MRSA sputum and nares + in OSH

## 2024-07-26 NOTE — BH INPATIENT PSYCHIATRY ASSESSMENT NOTE - DESCRIPTION
patient is single, unemployed on SSD, lives alone in supportive housing. Has 2 sisters who are his primary supports. Enjoys greenery, nature, going to [place], going to the [employer]. 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 Does not have children. Not in a relationship.

## 2024-07-26 NOTE — BH INPATIENT PSYCHIATRY ASSESSMENT NOTE - CURRENT PLAN:
CM received a phone call from [facility], who reported that at this time, they are only accepting new patients with Medicaid or Medicare; no private insurance  CM outreached to Pt on her cell at [phone] & informed her  She is still unsure what provider she is scheduled with for 4/13; she said they are to call her  Pt is open to referrals anywhere, near her home zip code  CM contacted [id number] [facility] @ [phone] & left a message seeking to refer Pt for therapy  CM contacted [facility] @ [phone] & was able to schedule Pt a medication management appointment for 3/28 at 8:30 PM with ERIK Suarez  Pt will receive several emails with attachments to be completed & returned prior to the appointment, as well as how to access the virtual appointment  [facility] confirmed fax number [phone]  CM asked if Pt can see a therapist at another provider, but was told no, she must see a clinician within their practice  CM was told that once Pt sends the forms back, they will pair her with a clinician/therapist, who will contact Pt directly  CM called Pt & provided her the above info   CM also sent it in an email to her at [email or web address]  None known

## 2024-07-26 NOTE — BH INPATIENT PSYCHIATRY ASSESSMENT NOTE - NSBHMEDSOTHERFT_PSY_A_CORE
Per chart review, patient was switched from abilify to haldol during his last hospitalization. At discharge he was taking haldol 10 mg PO BID. He received haldol decanoate 100 mg IM on 7/5 and the next loading dose of 100 mg IM on 7/10. He will be due for maintenance dose of haldol decanoate 200 mg IM on 8/8/24. Patient reports that he recalls that he received 2 of the haldol shots and the plan was to continue treatment on outpatient basis.

## 2024-07-26 NOTE — BH INPATIENT PSYCHIATRY ASSESSMENT NOTE - NSBHCHARTREVIEWVS_PSY_A_CORE FT
Vital Signs Last 24 Hrs  T(C): 36.7 (07-26-24 @ 20:21), Max: 37.1 (07-26-24 @ 17:39)  T(F): 98.1 (07-26-24 @ 20:21), Max: 98.7 (07-26-24 @ 17:39)  HR: 64 (07-26-24 @ 17:39) (62 - 65)  BP: 135/80 (07-26-24 @ 17:39) (128/76 - 150/72)  BP(mean): --  RR: 18 (07-26-24 @ 17:39) (18 - 18)  SpO2: 97% (07-26-24 @ 17:39) (97% - 99%)    Orthostatic VS  07-26-24 @ 20:21  Lying BP: --/-- HR: 166  Sitting BP: 85/66 HR: 158  Standing BP: 76/68 HR: --  Site: --  Mode: --   Vital Signs Last 24 Hrs  T(C): 36.7 (07-26-24 @ 20:21), Max: 37.1 (07-26-24 @ 17:39)  T(F): 98.1 (07-26-24 @ 20:21), Max: 98.7 (07-26-24 @ 17:39)  HR: 64 (07-26-24 @ 17:39) (64 - 65)  BP: 135/80 (07-26-24 @ 17:39) (128/76 - 140/67)  BP(mean): --  RR: 18 (07-26-24 @ 17:39) (18 - 18)  SpO2: 97% (07-26-24 @ 17:39) (97% - 98%)    Orthostatic VS  07-26-24 @ 20:21  Lying BP: --/-- HR: 166  Sitting BP: 85/66 HR: 158  Standing BP: 76/68 HR: --  Site: --  Mode: --

## 2024-07-26 NOTE — BH PATIENT PROFILE - STATED REASON FOR ADMISSION
A 59y/o male dx of schizoaffective disorder, hx of multiple inpatient psych hospitalizations (last hospitalized at [facility] from 6/22-7/12/24), follows outpt at [facility] and [facility] of HTN, HLD, hypothyroidism, COVID/ hypogammaglobulinemia (on monthly IVIG - Last dose Jun 16th, 2024), COPD not on home O2, hx of frequent skin infection who presented as initially to [facility] for  overdose of labetalol, but was also found to have left leg abscess vs cellulitis and was transferred to [facility] for IVIG infusions (received 7/24/24). He recalls that he on the day of discharge from [facility], he started having paranoid thoughts .After psych evaluation pt was transferred to [facility] for stabilization and treatment.Pt is calm & co-operative with admission procedure,skin assessment & sharp check done. He still  have left hip abscess & no complaints of pain. Pt denies S/I/H/I/I/P. & pt denies A/V/H. Oriented him to unit and surroundings.had snacks.safety maintained,assessment ongoing.

## 2024-07-26 NOTE — BH PATIENT PROFILE - HOME MEDICATIONS
insulin lispro 100 units/mL injectable solution , 1 unit(s) injectable 3 times a day (before meals) 1 Unit if glucose 151-200  2 Unit if glucose 201-250  3 Unit if glucose 251-300  4 Unit if glucose 301-350  5 Unit if glucose 351-400  insulin lispro 100 units/mL injectable solution , 1 unit(s) injectable once a day (at bedtime) 0 Units if glucose 0-250  1 Unit if glucose 251-300  2 Units if glucose 301-350  3 Units if glucose 351-400  tamsulosin 0.4 mg oral capsule , 1 cap(s) orally once a day (at bedtime)  doxycycline monohydrate 50 mg oral capsule , 2 cap(s) orally every 12 hours through 07/28  sodium chloride 1 g oral tablet , 1 tab(s) orally 3 times a day  atorvastatin 40 mg oral tablet , 1 tab(s) orally once a day (at bedtime)  haloperidol 5 mg oral tablet , 1 tab(s) orally once a day  amLODIPine 10 mg oral tablet , 1 tab(s) orally once a day  labetalol 200 mg oral tablet , 1 tab(s) orally 2 times a day  levothyroxine 50 mcg (0.05 mg) oral tablet , 1 tab(s) orally once a day

## 2024-07-26 NOTE — BH INPATIENT PSYCHIATRY ASSESSMENT NOTE - NSBHASSESSSUMMFT_PSY_ALL_CORE
- last day of oral antibiotics on Sunday   - s/p IV IG infusion on 7/24/24 (monthly)   -  on haldol 5mg PO qd; Received haldol decanoate 100 mg IM on 7/5 and the next loading dose of 100 mg IM on 7/10. He will be due for maintenance dose of haldol decanoate 200 mg IM on 8/8/24. - last day of oral antibiotics on Sunday   - s/p IV IG infusion on 7/24/24 (monthly)   -  on haldol 5mg PO qd; Received haldol decanoate 100 mg IM on 7/5 and the next loading dose of 100 mg IM on 7/10. He will be due for maintenance dose of haldol decanoate 200 mg IM on 8/8/24.  - CL psych recommended starting Lexapro 5,mg po on 7/26/24

## 2024-07-26 NOTE — BH INPATIENT PSYCHIATRY ASSESSMENT NOTE - RISK ASSESSMENT
Recent impulsive, high lethality suicide attempt after discharge from inpatient psychiatric hospital due to paraniod persecutory delusions Pt acted on, extenisve psychiatric hx; limited insight and judgment at this time; numerous medical issues impairing everyday independent functioning.  Protective factors include being future-oriented, engaged in treatment, and support of family (sisters), no hx of subsatnce use.

## 2024-07-26 NOTE — BH INPATIENT PSYCHIATRY ASSESSMENT NOTE - DETAILS
Around 7/12/24 on the day of discharge from [facility], patient returned home and had several paranoid thoughts. In distress, he attempted suicide by overdosing on labetalol. Per chart review he took about 80 pills of unknown dose.

## 2024-07-27 DIAGNOSIS — L03.90 CELLULITIS, UNSPECIFIED: ICD-10-CM

## 2024-07-27 DIAGNOSIS — E78.5 HYPERLIPIDEMIA, UNSPECIFIED: ICD-10-CM

## 2024-07-27 DIAGNOSIS — F25.9 SCHIZOAFFECTIVE DISORDER, UNSPECIFIED: ICD-10-CM

## 2024-07-27 DIAGNOSIS — T14.91XA SUICIDE ATTEMPT, INITIAL ENCOUNTER: ICD-10-CM

## 2024-07-27 DIAGNOSIS — I10 ESSENTIAL (PRIMARY) HYPERTENSION: ICD-10-CM

## 2024-07-27 DIAGNOSIS — D83.9 COMMON VARIABLE IMMUNODEFICIENCY, UNSPECIFIED: ICD-10-CM

## 2024-07-27 DIAGNOSIS — N17.9 ACUTE KIDNEY FAILURE, UNSPECIFIED: ICD-10-CM

## 2024-07-27 LAB
GLUCOSE BLDC GLUCOMTR-MCNC: 133 MG/DL — HIGH (ref 70–99)
GLUCOSE BLDC GLUCOMTR-MCNC: 89 MG/DL — SIGNIFICANT CHANGE UP (ref 70–99)
GLUCOSE BLDC GLUCOMTR-MCNC: 96 MG/DL — SIGNIFICANT CHANGE UP (ref 70–99)
GLUCOSE BLDC GLUCOMTR-MCNC: 99 MG/DL — SIGNIFICANT CHANGE UP (ref 70–99)

## 2024-07-27 PROCEDURE — 99232 SBSQ HOSP IP/OBS MODERATE 35: CPT

## 2024-07-27 RX ADMIN — ESCITALOPRAM OXALATE 5 MILLIGRAM(S): 10 TABLET ORAL at 08:13

## 2024-07-27 RX ADMIN — Medication 50 MILLIGRAM(S): at 21:34

## 2024-07-27 RX ADMIN — SODIUM CHLORIDE 1 GRAM(S): 9 INJECTION INTRAMUSCULAR; INTRAVENOUS; SUBCUTANEOUS at 10:18

## 2024-07-27 RX ADMIN — Medication 40 MILLIGRAM(S): at 20:38

## 2024-07-27 RX ADMIN — Medication 200 MILLIGRAM(S): at 10:17

## 2024-07-27 RX ADMIN — Medication 100 MILLIGRAM(S): at 20:38

## 2024-07-27 RX ADMIN — Medication 100 MILLIGRAM(S): at 08:13

## 2024-07-27 RX ADMIN — Medication 5 MILLIGRAM(S): at 08:13

## 2024-07-27 RX ADMIN — Medication 50 MICROGRAM(S): at 06:26

## 2024-07-27 RX ADMIN — TAMSULOSIN HYDROCHLORIDE 0.4 MILLIGRAM(S): 0.4 CAPSULE ORAL at 20:38

## 2024-07-27 RX ADMIN — AMLODIPINE BESYLATE 10 MILLIGRAM(S): 10 TABLET ORAL at 08:14

## 2024-07-27 RX ADMIN — SODIUM CHLORIDE 1 GRAM(S): 9 INJECTION INTRAMUSCULAR; INTRAVENOUS; SUBCUTANEOUS at 12:27

## 2024-07-27 RX ADMIN — SODIUM CHLORIDE 1 GRAM(S): 9 INJECTION INTRAMUSCULAR; INTRAVENOUS; SUBCUTANEOUS at 20:38

## 2024-07-27 RX ADMIN — Medication 200 MILLIGRAM(S): at 20:38

## 2024-07-27 NOTE — CONSULT NOTE ADULT - ASSESSMENT
57 yo Male with PMHx schizoaffective disorder (bipolar type, w/ multiple [facility] admissions), HTN, HLD, hypothyroidism, CVID/ hypogammaglobulinemia (on monthly IVIG - Last dose July 23, 2024, follows w/ Dr. Mitchell Boxer), COPD not on home O2, hx of frequent skin infection (MRSA abscess / cellulitis w/ MRSA bacteremia s/p debridement 6/2023) presented to [facility] s/p overdose (labetalol), incidentally found to have left leg  cellulitis, started on abx and transferred to [facility] for IVIG infusions.      #Cellulitis of the left leg.   --[facility] CT hip without evidence of fluid collection or CT evidence of osteo  -- s/p course of Vancomycin  --Transitioned to doxycycline 100mg BID , last day 7/28  -- Bcx NGTD   --Would benefit from wound care eval on 7/29    #Common variable immunodeficiency.   -- CVID, diagnosed @ 19, follows Dr. Mitchell Boxer. Monthly IVIG   -- s/p IVIG x1 at [facility] 07/23  --c/w outpatient follow up    #Acute kidney injury.   --Possibly i/s/o overdose  - spot Ur Na = 69  - U/A w/ mild spot proteinuria otherwise unremarkable  - renal/bladder U/S: no hydronephrosis   - hold home ARB  - Avoid nephrotoxins/ renally dose meds  - Monitor sCR    #HTN  --C/w labetalol 200mg BID  --C/w amlodipine 10mg QD  --Hold ARB    #HLD  --C/w atorvastatin 40mg QD    #Hypothyroidism  --c/w levothyroxine 50mcg QD    #Attempted suicide.   - Attempted suicide w/ labetalol, requiring glucagon, insulin ggt, pressors. Currently off pressors, hemodynamically stable.   - Patient states that he had feelings of paranoia which prompted this episode   -Mgt per psych       #Schizoaffective disorder  --Mgt per psych

## 2024-07-27 NOTE — BH INPATIENT PSYCHIATRY PROGRESS NOTE - NSBHASSESSSUMMFT_PSY_ALL_CORE
59 yo M with PPhx significant for schizoaffective disorder, history of multiple inpatient psychiatric hospitalizations (last hospitalized at [facility] from 6/22-7/12/24), follows outpatient at [facility] with Dr. Cook, and PMH significant for HTN, HLD, hypothyroidism, CVID/ hypogammaglobulinemia (on monthly IVIG - Last dose Jun 16th, 2024), COPD not on home O2, hx of frequent skin infection who presented as initially to [facility] for  overdose of labetalol, but was also found to have left leg abscess vs cellulitis and was transferred on 7/22/24 for IVIG infusions (received 7/24/24).  Psychiatry evaluated Patient: "... somewhat childlike, but overall pleasant and engaged. He recalls that he on the day of discharge from [facility], he started having paranoid thoughts about people wanting to kill him and that others were out to get him. States that the paranoia was so distressing that he immediately thought of ending his life and overdosed on numerous labetalol pills. He started to feel nauseated and realized that he does want to live and immediately called the ambulance where he was taken to [facility]. Patient expresses that the negative thoughts, paranoia, suicidal thoughts were difficult for him when he returned home from his psychiatric hospitalization because he has a hard time talking to others about it. He reports that he follows with Dr. Cook at [facility] on an outpatient basis, and has been doing well with individualized treatment with her." Per chart review, patient was switched from abilify to haldol during his last hospitalization. At discharge he was taking haldol 10 mg PO BID. He received haldol decanoate 100 mg IM on 7/5 and the next loading dose of 100 mg IM on 7/10. He will be due for maintenance dose of haldol decanoate 200 mg IM on 8/8/24. Patient reports that he recalls that he received 2 of the haldol shots and the plan was to continue treatment on outpatient basis.   AT [facility]:  Left lateral leg w/ pain and purulence. Immunocompromised given hx of CVID. Currently w/o systemic signs of infection. MRSA sputum and nares + in OSH. s/p Vanc --> transitioned to Bactrim (Started on 7/13), s/p course of vanco at [facility], c/w doxycycline through 7/28; need for MRI. pt also very claustrophobic. MRI order d/c. 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 Mitchell Boxer. s/p IVIG x1 at [facility] 07/24/24. TASH -  Elevated creatine 1.8, BUN 26. Creatine in June 2024. 0.7, drug induced at [facility] vs ATN from shock;  U/A relatively bland with mild spot proteinuria; renal/bladder U/S: no hydronephrosis. hold home ARB      - last day of oral antibiotics on Sunday   - s/p IV IG infusion on 7/24/24 (monthly)   -  on haldol 5mg PO qd; Received haldol decanoate 100 mg IM on 7/5 and the next loading dose of 100 mg IM on 7/10. He will be due for maintenance dose of haldol decanoate 200 mg IM on 8/8/24.  - CL psych recommended starting Lexapro 5,mg po on 7/26/24

## 2024-07-27 NOTE — BH INPATIENT PSYCHIATRY PROGRESS NOTE - PRN MEDS
MEDICATIONS  (PRN):  acetaminophen     Tablet .. 650 milliGRAM(s) Oral every 6 hours PRN Temp greater or equal to 38C (100.4F), Mild Pain (1 - 3)  aluminum hydroxide/magnesium hydroxide/simethicone Suspension 30 milliLiter(s) Oral every 6 hours PRN Dyspepsia  chlorproMAZINE    Injectable 50 milliGRAM(s) IntraMuscular once PRN agitation  dextrose Oral Gel 15 Gram(s) Oral once PRN Blood Glucose LESS THAN 70 milliGRAM(s)/deciliter  LORazepam     Tablet 2 milliGRAM(s) Oral every 6 hours PRN Anxiety  LORazepam   Injectable 2 milliGRAM(s) IntraMuscular once PRN Agitation  magnesium hydroxide Suspension 30 milliLiter(s) Oral daily PRN Constipation  melatonin. 3 milliGRAM(s) Oral at bedtime PRN Insomnia  traZODone 50 milliGRAM(s) Oral at bedtime PRN insomnia

## 2024-07-27 NOTE — CONSULT NOTE ADULT - SUBJECTIVE AND OBJECTIVE BOX
57 yo Male with PMHx schizoaffective disorder (bipolar type, w/ multiple [facility] admissions), HTN, HLD, hypothyroidism, CVID/ hypogammaglobulinemia (on monthly IVIG - Last dose July 23, 2024, follows w/ Dr. Mitchell Boxer), COPD not on home O2, hx of frequent skin infection (MRSA abscess / cellulitis w/ MRSA bacteremia s/p debridement 6/2023) presented to [facility] s/p overdose (labetalol), incidentally found to have left leg abscess vs cellulitis, started on abx and transferred to [facility] for IVIG infusions. Now s/p hospitalization, transferred to [facility] for further mgt. Pt at this time states he's doing "okay." Reports concern for Left leg wound due to inadequate care at home. States he does not know how to care for the wound and lives alone. Otherwise No complaints.. Denies fevers, chills, N/V, Chest pain, SOB, abdominal pain, constipation, diarrhea.         PAST MEDICAL/SURGICAL HISTORY  PAST MEDICAL & SURGICAL HISTORY:  Smoker      DM (diabetes mellitus)      HTN (hypertension)      Abscess of finger      Bipolar disorder      Chronic hyponatremia      CVID (common variable immunodeficiency)      Hyponatremia      Smoker      Deviated septum      Loss of teeth due to extraction  All teeth due to dental carries          REVIEW OF SYSTEMS:  CONSTITUTIONAL: No fever,  or fatigue  EYES: No eye pain, visual disturbances, or discharge  ENMT:  No difficulty hearing, no  vertigo  NECK: No pain or stiffness  RESPIRATORY: No cough, wheezing, No shortness of breath  CARDIOVASCULAR: No chest pain, palpitations, dizziness, or leg swelling  GASTROINTESTINAL: No abdominal or epigastric pain. No nausea, vomiting. No diarrhea or constipation. No melena or hematochezia.  GENITOURINARY: No dysuria, frequency, hematuria, or incontinence  NEUROLOGICAL: No headaches, memory loss, loss of strength, numbness, or tremors  SKIN: No itching, burning, rashes, or lesions   ENDOCRINE: No heat or cold intolerance; No hair loss  MUSCULOSKELETAL: No joint pain or swelling; No muscle, back, or extremity pain  HEME/LYMPH: No easy bruising, or bleeding gums      T(C): 36.6 (07-27-24 @ 08:18), Max: 37.1 (07-26-24 @ 17:39)  HR: 64 (07-26-24 @ 17:39) (64 - 64)  BP: 135/80 (07-26-24 @ 17:39) (135/80 - 135/80)  RR: 18 (07-26-24 @ 21:20) (18 - 18)  SpO2: 97% (07-26-24 @ 17:39) (97% - 97%)  Wt(kg): --Vital Signs Last 24 Hrs  T(C): 36.6 (27 Jul 2024 08:18), Max: 37.1 (26 Jul 2024 17:39)  T(F): 97.8 (27 Jul 2024 08:18), Max: 98.7 (26 Jul 2024 17:39)  HR: 64 (26 Jul 2024 17:39) (64 - 64)  BP: 135/80 (26 Jul 2024 17:39) (135/80 - 135/80)  BP(mean): --  RR: 18 (26 Jul 2024 21:20) (18 - 18)  SpO2: 97% (26 Jul 2024 17:39) (97% - 97%)        PHYSICAL EXAM:  GENERAL: NAD  HEAD:  Atraumatic, Normocephalic  EYES: EOMI, conjunctiva and sclera clear  NECK: Supple  CHEST/LUNG: Clear to auscultation bilaterally; No wheeze, rhonchi, crackles or rales  HEART: Regular rate and rhythm; No murmurs, rubs, or gallops  ABDOMEN: Soft, Nontender, Nondistended; Bowel sounds present  EXTREMITIES:  2+ Peripheral Pulses, No edema  PSYCH: AAOx3  NEUROLOGY: non-focal  SKIN: +L lateral thigh w/ dressing c/d/i. +L Lateral shin w/ chronic/ healed wound. No discharge , drainage or erythema     Consultant(s) Notes Reviewed:  [x ] YES  [ ] NO  Care Discussed with Consultants/Other Providers [ x] YES  [ ] NO    LABS:  CBC       BMP  07-26-24 @ 06:40  Anion Gap. Serum 13  Blood Urea Nitrogen,Serm 22  Calcium, Total Serum 9.4  Carbon Dioxide, Serum 24  Chloride, Serum 96  Creatinine, Serum 1.63  eGFR in  --  eGFR in Non Afican American --  Gloucose, serum 92  Potassium, Serum 4.4  Sodium, Serum 133              07-25-24 @ 06:00  Anion Gap. Serum 11  Blood Urea Nitrogen,Serm 24  Calcium, Total Serum 9.6  Carbon Dioxide, Serum 24  Chloride, Serum 100  Creatinine, Serum 1.66  eGFR in  --  eGFR in Non Afican American --  Gloucose, serum 88  Potassium, Serum 4.5  Sodium, Serum 135                  CMP  07-26-24 @ 06:40  Francy Aminotransferase(ALT/SGPT)--  Albumin, Serum --  Alkaline Phosphatase, Serum --  Anion Gap, Serum 13  Aspartate Aminotransferase (AST/SGOT)--  Bilirubin Total, Serum --  Blood Urea Nitrogen, Serum 22  Calcium,Total Serum 9.4  Carbon Dioxide, Serum 24  Chloride, Serum 96  Creatinine, Serum 1.63  eGFR if  --  eGFR if Non African American --  Glucose, Serum 92  Potassium, Serum 4.4  Protein Total, Serum --  Sodium, Serum 133                      07-25-24 @ 06:00  Francy Aminotransferase(ALT/SGPT)--  Albumin, Serum --  Alkaline Phosphatase, Serum --  Anion Gap, Serum 11  Aspartate Aminotransferase (AST/SGOT)--  Bilirubin Total, Serum --  Blood Urea Nitrogen, Serum 24  Calcium,Total Serum 9.6  Carbon Dioxide, Serum 24  Chloride, Serum 100  Creatinine, Serum 1.66  eGFR if  --  eGFR if Non African American --  Glucose, Serum 88  Potassium, Serum 4.5  Protein Total, Serum --  Sodium, Serum 135                          PT/INR      Amylase/Lipase          RADIOLOGY & ADDITIONAL TESTS:    Imaging Personally Reviewed:  [ ] YES  [ ] NO

## 2024-07-27 NOTE — BH INPATIENT PSYCHIATRY PROGRESS NOTE - NSBHFUPINTERVALHXFT_PSY_A_CORE
Chart reviewed and case discussed with treatment team. No events reported overnight. Sleep and appetite is fair. Patient reported feeling remorseful for recent SA, "It was a big mistake". Denies any SI. He endorses paranoid delusions that someone is out to harm him. Patient is compliant with medications, no adverse effects reported.

## 2024-07-27 NOTE — BH INPATIENT PSYCHIATRY PROGRESS NOTE - CURRENT MEDICATION
MEDICATIONS  (STANDING):  amLODIPine   Tablet 10 milliGRAM(s) Oral daily  atorvastatin 40 milliGRAM(s) Oral at bedtime  dextrose 5%. 1000 milliLiter(s) (50 mL/Hr) IV Continuous <Continuous>  dextrose 5%. 1000 milliLiter(s) (100 mL/Hr) IV Continuous <Continuous>  dextrose 50% Injectable 25 Gram(s) IV Push once  dextrose 50% Injectable 12.5 Gram(s) IV Push once  dextrose 50% Injectable 25 Gram(s) IV Push once  doxycycline monohydrate Capsule 100 milliGRAM(s) Oral every 12 hours  escitalopram 5 milliGRAM(s) Oral daily  glucagon  Injectable 1 milliGRAM(s) IntraMuscular once  haloperidol     Tablet 5 milliGRAM(s) Oral daily  insulin lispro (ADMELOG) corrective regimen sliding scale   SubCutaneous three times a day before meals  insulin lispro (ADMELOG) corrective regimen sliding scale   SubCutaneous at bedtime  labetalol 200 milliGRAM(s) Oral two times a day  levothyroxine 50 MICROGram(s) Oral daily  sodium chloride 1 Gram(s) Oral three times a day  tamsulosin 0.4 milliGRAM(s) Oral at bedtime    MEDICATIONS  (PRN):  acetaminophen     Tablet .. 650 milliGRAM(s) Oral every 6 hours PRN Temp greater or equal to 38C (100.4F), Mild Pain (1 - 3)  aluminum hydroxide/magnesium hydroxide/simethicone Suspension 30 milliLiter(s) Oral every 6 hours PRN Dyspepsia  chlorproMAZINE    Injectable 50 milliGRAM(s) IntraMuscular once PRN agitation  dextrose Oral Gel 15 Gram(s) Oral once PRN Blood Glucose LESS THAN 70 milliGRAM(s)/deciliter  LORazepam     Tablet 2 milliGRAM(s) Oral every 6 hours PRN Anxiety  LORazepam   Injectable 2 milliGRAM(s) IntraMuscular once PRN Agitation  magnesium hydroxide Suspension 30 milliLiter(s) Oral daily PRN Constipation  melatonin. 3 milliGRAM(s) Oral at bedtime PRN Insomnia  traZODone 50 milliGRAM(s) Oral at bedtime PRN insomnia

## 2024-07-27 NOTE — CONSULT NOTE ADULT - TIME BILLING
reviewing laboratory data, consultants' recommendations, documentation in [place], performing medically appropriate examinations/evaluations, discussion with patient/family/RN/ACP/Residents and interdisciplinary staff (such as , social workers, etc), counseling patient/family/care giver, ordering medically appropriate medication, tests, or procedures

## 2024-07-27 NOTE — BH INPATIENT PSYCHIATRY PROGRESS NOTE - NSBHMETABOLIC_PSY_ALL_CORE_FT
BMI: BMI (kg/m2): 33.4 (07-23-24 @ 10:08)  HbA1c: A1C with Estimated Average Glucose Result: 5.8 % (07-24-24 @ 07:48)    Glucose: POCT Blood Glucose.: 89 mg/dL (07-27-24 @ 12:07)    BP: --Vital Signs Last 24 Hrs  T(C): 36.6 (07-27-24 @ 08:18), Max: 37.1 (07-26-24 @ 17:39)  T(F): 97.8 (07-27-24 @ 08:18), Max: 98.7 (07-26-24 @ 17:39)  HR: 64 (07-26-24 @ 17:39) (64 - 64)  BP: 135/80 (07-26-24 @ 17:39) (135/80 - 135/80)  BP(mean): --  RR: 18 (07-26-24 @ 21:20) (18 - 18)  SpO2: 97% (07-26-24 @ 17:39) (97% - 97%)    Orthostatic VS  07-27-24 @ 08:18  Lying BP: --/-- HR: --  Sitting BP: 135/74 HR: 70  Standing BP: 129/63 HR: 75  Site: --  Mode: --  Orthostatic VS  07-26-24 @ 21:20  Lying BP: --/-- HR: --  Sitting BP: 197/89 HR: 70  Standing BP: 161/90 HR: 76  Site: --  Mode: --  Orthostatic VS  07-26-24 @ 20:21  Lying BP: --/-- HR: --  Sitting BP: 166/85 HR: 66  Standing BP: 158/76 HR: 68  Site: --  Mode: --    Lipid Panel: Date/Time: 07-24-24 @ 07:48  Cholesterol, Serum: 81  LDL Cholesterol Calculated: 34  HDL Cholesterol, Serum: 30  Total Cholesterol/HDL Ration Measurement: --  Triglycerides, Serum: 84

## 2024-07-28 LAB
GLUCOSE BLDC GLUCOMTR-MCNC: 140 MG/DL — HIGH (ref 70–99)
GLUCOSE BLDC GLUCOMTR-MCNC: 89 MG/DL — SIGNIFICANT CHANGE UP (ref 70–99)
GLUCOSE BLDC GLUCOMTR-MCNC: 92 MG/DL — SIGNIFICANT CHANGE UP (ref 70–99)
GLUCOSE BLDC GLUCOMTR-MCNC: 95 MG/DL — SIGNIFICANT CHANGE UP (ref 70–99)

## 2024-07-28 PROCEDURE — 99232 SBSQ HOSP IP/OBS MODERATE 35: CPT

## 2024-07-28 RX ADMIN — Medication 3 MILLIGRAM(S): at 20:33

## 2024-07-28 RX ADMIN — Medication 100 MILLIGRAM(S): at 20:29

## 2024-07-28 RX ADMIN — Medication 50 MICROGRAM(S): at 06:11

## 2024-07-28 RX ADMIN — TAMSULOSIN HYDROCHLORIDE 0.4 MILLIGRAM(S): 0.4 CAPSULE ORAL at 20:30

## 2024-07-28 RX ADMIN — ESCITALOPRAM OXALATE 5 MILLIGRAM(S): 10 TABLET ORAL at 08:40

## 2024-07-28 RX ADMIN — Medication 200 MILLIGRAM(S): at 08:40

## 2024-07-28 RX ADMIN — AMLODIPINE BESYLATE 10 MILLIGRAM(S): 10 TABLET ORAL at 08:40

## 2024-07-28 RX ADMIN — SODIUM CHLORIDE 1 GRAM(S): 9 INJECTION INTRAMUSCULAR; INTRAVENOUS; SUBCUTANEOUS at 13:51

## 2024-07-28 RX ADMIN — Medication 100 MILLIGRAM(S): at 08:40

## 2024-07-28 RX ADMIN — Medication 200 MILLIGRAM(S): at 20:29

## 2024-07-28 RX ADMIN — Medication 5 MILLIGRAM(S): at 08:40

## 2024-07-28 RX ADMIN — Medication 50 MILLIGRAM(S): at 20:33

## 2024-07-28 RX ADMIN — SODIUM CHLORIDE 1 GRAM(S): 9 INJECTION INTRAMUSCULAR; INTRAVENOUS; SUBCUTANEOUS at 08:40

## 2024-07-28 RX ADMIN — Medication 40 MILLIGRAM(S): at 20:29

## 2024-07-28 RX ADMIN — SODIUM CHLORIDE 1 GRAM(S): 9 INJECTION INTRAMUSCULAR; INTRAVENOUS; SUBCUTANEOUS at 20:31

## 2024-07-28 NOTE — BH INPATIENT PSYCHIATRY PROGRESS NOTE - NSBHASSESSSUMMFT_PSY_ALL_CORE
57 yo M with PPhx significant for schizoaffective disorder, history of multiple inpatient psychiatric hospitalizations (last hospitalized at [facility] from 6/22-7/12/24), follows outpatient at [facility] with Dr. Cook, and PMH significant for HTN, HLD, hypothyroidism, CVID/ hypogammaglobulinemia (on monthly IVIG - Last dose Jun 16th, 2024), COPD not on home O2, hx of frequent skin infection who presented as initially to [facility] for  overdose of labetalol, but was also found to have left leg abscess vs cellulitis and was transferred on 7/22/24 for IVIG infusions (received 7/24/24).  Psychiatry evaluated Patient: "... somewhat childlike, but overall pleasant and engaged. He recalls that he on the day of discharge from [facility], he started having paranoid thoughts about people wanting to kill him and that others were out to get him. States that the paranoia was so distressing that he immediately thought of ending his life and overdosed on numerous labetalol pills. He started to feel nauseated and realized that he does want to live and immediately called the ambulance where he was taken to [facility]. Patient expresses that the negative thoughts, paranoia, suicidal thoughts were difficult for him when he returned home from his psychiatric hospitalization because he has a hard time talking to others about it. He reports that he follows with Dr. Cook at [facility] on an outpatient basis, and has been doing well with individualized treatment with her." Per chart review, patient was switched from abilify to haldol during his last hospitalization. At discharge he was taking haldol 10 mg PO BID. He received haldol decanoate 100 mg IM on 7/5 and the next loading dose of 100 mg IM on 7/10. He will be due for maintenance dose of haldol decanoate 200 mg IM on 8/8/24. Patient reports that he recalls that he received 2 of the haldol shots and the plan was to continue treatment on outpatient basis.   AT [facility]:  Left lateral leg w/ pain and purulence. Immunocompromised given hx of CVID. Currently w/o systemic signs of infection. MRSA sputum and nares + in OSH. s/p Vanc --> transitioned to Bactrim (Started on 7/13), s/p course of vanco at [facility], c/w doxycycline through 7/28; need for MRI. pt also very claustrophobic. MRI order d/c. 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 Mitchell Boxer. s/p IVIG x1 at [facility] 07/24/24. TASH -  Elevated creatine 1.8, BUN 26. Creatine in June 2024. 0.7, drug induced at [facility] vs ATN from shock;  U/A relatively bland with mild spot proteinuria; renal/bladder U/S: no hydronephrosis. hold home ARB      - last day of oral antibiotics on Sunday   - s/p IV IG infusion on 7/24/24 (monthly)   -  on haldol 5mg PO qd; Received haldol decanoate 100 mg IM on 7/5 and the next loading dose of 100 mg IM on 7/10. He will be due for maintenance dose of haldol decanoate 200 mg IM on 8/8/24.  - [facility] psych recommended starting Lexapro 5,mg po on 7/26/24

## 2024-07-28 NOTE — BH INPATIENT PSYCHIATRY PROGRESS NOTE - NSBHFUPINTERVALHXFT_PSY_A_CORE
Chart reviewed and case discussed with treatment team. No events reported overnight. Sleep and appetite is fair. Patient denies any SI, "none at all" and reported feeling more hopeful and future oriented. Patient stated that he is looking forward to an upcoming trip with his sister. Patient is compliant with medications, no adverse effects reported.

## 2024-07-28 NOTE — ED BEHAVIORAL HEALTH ASSESSMENT NOTE - CURRENTLY ENROLLED STUDENT
Principal Problem:Cellulitis, leg    Principal Orthopedic Problem: as above    Interval History: Pt seen and examined at bedside. Episodes of tachycardia yesterday, otherwise, VSS.  NAEON. Was able to put weight on BL LE yesterday. Able to walk to play room.       Review of patient's allergies indicates:  No Known Allergies    Current Facility-Administered Medications   Medication    acetaminophen 32 mg/mL liquid (PEDS) 230.4 mg    clindamycin in dextrose 5% IV syringe (PEDS) (conc: 6 mg/mL) 156 mg 26 mL    dextrose 5 % and 0.9 % NaCl infusion    ibuprofen 20 mg/mL oral liquid 153 mg     Objective:     Vital Signs (Most Recent):  Temp: 98 °F (36.7 °C) (07/28/24 0459)  Pulse: 105 (07/28/24 0459)  Resp: 20 (07/28/24 0459)  BP: 94/60 (07/28/24 0459)  SpO2: 100 % (07/28/24 0459) Vital Signs (24h Range):  Temp:  [97.6 °F (36.4 °C)-98.3 °F (36.8 °C)] 98 °F (36.7 °C)  Pulse:  [105-135] 105  Resp:  [20-24] 20  SpO2:  [96 %-100 %] 100 %  BP: ()/(60-80) 94/60     Weight: 15.3 kg (33 lb 11.7 oz)     There is no height or weight on file to calculate BMI.      Intake/Output Summary (Last 24 hours) at 7/28/2024 0837  Last data filed at 7/27/2024 2149  Gross per 24 hour   Intake 299.17 ml   Output 181 ml   Net 118.17 ml        Ortho/SPM Exam     AAOx4  NAD  Tachy  No increased WOB    RLE    No expressible purulence or active drainage.   Mild erythema around drainage site  Compartments soft/compressible  AROM of toes, ankle, and knee intact.   Brisk cap refill      Significant Labs: CBC:   Recent Labs   Lab 07/26/24  1558   WBC 12.16   HGB 12.1   HCT 35.6   *     CMP:   Recent Labs   Lab 07/26/24  1558      K 3.8      CO2 18*      BUN 10   CREATININE 0.5   CALCIUM 9.6   PROT 7.0   ALBUMIN 3.6   BILITOT 0.2   ALKPHOS 174   AST 26   ALT 11   ANIONGAP 11     CRP:   Recent Labs   Lab 07/26/24  1558 07/28/24  0347   CRP 57.6* 50.3*     All pertinent labs within the past 24 hours have been  reviewed.    Significant Imaging: I have reviewed and interpreted all pertinent imaging results/findings.   No

## 2024-07-28 NOTE — PSYCHIATRIC REHAB INITIAL EVALUATION - NSBHPRRECOMMEND_PSY_ALL_CORE
Writer met with patient to orient to unit and introduce self, psychiatric rehabilitation staff and department functions. Patient was receptive to meeting with writer and engaged during admission interview. Writer encouraged patient to attend psychiatric rehabilitation groups and engage in treatment. Writer and patient were able to establish a collaborative psychiatric rehabilitation goal. Psychiatric Rehabilitation staff will continue to engage patient daily in order to develop therapeutic rapport.

## 2024-07-29 LAB
GLUCOSE BLDC GLUCOMTR-MCNC: 105 MG/DL — HIGH (ref 70–99)
GLUCOSE BLDC GLUCOMTR-MCNC: 112 MG/DL — HIGH (ref 70–99)
GLUCOSE BLDC GLUCOMTR-MCNC: 89 MG/DL — SIGNIFICANT CHANGE UP (ref 70–99)
GLUCOSE BLDC GLUCOMTR-MCNC: 90 MG/DL — SIGNIFICANT CHANGE UP (ref 70–99)

## 2024-07-29 PROCEDURE — 99232 SBSQ HOSP IP/OBS MODERATE 35: CPT

## 2024-07-29 RX ADMIN — Medication 40 MILLIGRAM(S): at 20:29

## 2024-07-29 RX ADMIN — Medication 5 MILLIGRAM(S): at 08:57

## 2024-07-29 RX ADMIN — SODIUM CHLORIDE 1 GRAM(S): 9 INJECTION INTRAMUSCULAR; INTRAVENOUS; SUBCUTANEOUS at 20:31

## 2024-07-29 RX ADMIN — Medication 50 MICROGRAM(S): at 06:14

## 2024-07-29 RX ADMIN — AMLODIPINE BESYLATE 10 MILLIGRAM(S): 10 TABLET ORAL at 08:57

## 2024-07-29 RX ADMIN — ESCITALOPRAM OXALATE 5 MILLIGRAM(S): 10 TABLET ORAL at 08:57

## 2024-07-29 RX ADMIN — Medication 200 MILLIGRAM(S): at 08:57

## 2024-07-29 RX ADMIN — TAMSULOSIN HYDROCHLORIDE 0.4 MILLIGRAM(S): 0.4 CAPSULE ORAL at 20:30

## 2024-07-29 RX ADMIN — Medication 3 MILLIGRAM(S): at 20:31

## 2024-07-29 RX ADMIN — SODIUM CHLORIDE 1 GRAM(S): 9 INJECTION INTRAMUSCULAR; INTRAVENOUS; SUBCUTANEOUS at 12:21

## 2024-07-29 RX ADMIN — Medication 50 MILLIGRAM(S): at 20:31

## 2024-07-29 RX ADMIN — SODIUM CHLORIDE 1 GRAM(S): 9 INJECTION INTRAMUSCULAR; INTRAVENOUS; SUBCUTANEOUS at 08:57

## 2024-07-29 RX ADMIN — Medication 200 MILLIGRAM(S): at 20:29

## 2024-07-29 NOTE — ADVANCED PRACTICE NURSE CONSULT - ASSESSMENT
Patient is a 57 yo Male with PMHx schizoaffective disorder bipolar type, HTN, HLD, hypothyroidism, COVID/ hypogammaglobulinemia (on monthly IVIG - Last dose July 23, 2024, follows w/ Dr. Mitchell Boxer), COPD not on home O2, hx of frequent skin infection (MRSA abscess / cellulitis w/ MRSA bacteremia s/p debridement 6/2023) presented to [facility] s/p overdose (labetalol), incidentally found to have left leg  cellulitis, started on abx and [facility] infusions. Admitted to [facility] for inpatient psychiatry.  Consulted for wound care.  Patient denied pain.  On assessment Left thigh wound measuring 1.5 cm x 1 cm x 0.02 cm no undermining, no tunneling, wound bed with 50% tan-moist translucent fibrin film. Minimal drainage, erythema, and induration. MRSA and Staph aureus PCR -resulted not detected.  Left proximal leg- Healing wound measuring 2.5 cm x 2.5 cm skin intact no drainage, mild erythema.  Education provided on proper hand washing and hygiene.  Education provided on healthy meal planning.

## 2024-07-29 NOTE — BH INPATIENT PSYCHIATRY PROGRESS NOTE - NSBHMETABOLIC_PSY_ALL_CORE_FT
BMI: BMI (kg/m2): 33.4 (07-23-24 @ 10:08)  HbA1c: A1C with Estimated Average Glucose Result: 5.8 % (07-24-24 @ 07:48)    Glucose: POCT Blood Glucose.: 89 mg/dL (07-29-24 @ 11:24)    BP: 132/76 (07-28-24 @ 20:15) (132/76 - 132/76)Vital Signs Last 24 Hrs  T(C): 36.4 (07-29-24 @ 08:32), Max: 36.6 (07-28-24 @ 20:15)  T(F): 97.6 (07-29-24 @ 08:32), Max: 97.8 (07-28-24 @ 20:15)  HR: 70 (07-28-24 @ 20:15) (70 - 70)  BP: 132/76 (07-28-24 @ 20:15) (132/76 - 132/76)  BP(mean): --  RR: --  SpO2: --    Orthostatic VS  07-29-24 @ 08:32  Lying BP: --/-- HR: --  Sitting BP: 120/81 HR: 60  Standing BP: 110/70 HR: 64  Site: --  Mode: --  Orthostatic VS  07-28-24 @ 08:02  Lying BP: --/-- HR: --  Sitting BP: 129/74 HR: 66  Standing BP: 132/70 HR: 53  Site: --  Mode: --  Orthostatic VS  07-27-24 @ 19:43  Lying BP: --/-- HR: --  Sitting BP: 138/69 HR: 59  Standing BP: 123/61 HR: 67  Site: --  Mode: --    Lipid Panel: Date/Time: 07-24-24 @ 07:48  Cholesterol, Serum: 81  LDL Cholesterol Calculated: 34  HDL Cholesterol, Serum: 30  Total Cholesterol/HDL Ration Measurement: --  Triglycerides, Serum: 84
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
## 2024-07-29 NOTE — BH INPATIENT PSYCHIATRY PROGRESS NOTE - CURRENT MEDICATION
MEDICATIONS  (STANDING):  amLODIPine   Tablet 10 milliGRAM(s) Oral daily  atorvastatin 40 milliGRAM(s) Oral at bedtime  dextrose 5%. 1000 milliLiter(s) (50 mL/Hr) IV Continuous <Continuous>  dextrose 5%. 1000 milliLiter(s) (100 mL/Hr) IV Continuous <Continuous>  dextrose 50% Injectable 25 Gram(s) IV Push once  dextrose 50% Injectable 12.5 Gram(s) IV Push once  dextrose 50% Injectable 25 Gram(s) IV Push once  escitalopram 5 milliGRAM(s) Oral daily  glucagon  Injectable 1 milliGRAM(s) IntraMuscular once  haloperidol     Tablet 5 milliGRAM(s) Oral daily  insulin lispro (ADMELOG) corrective regimen sliding scale   SubCutaneous three times a day before meals  insulin lispro (ADMELOG) corrective regimen sliding scale   SubCutaneous at bedtime  labetalol 200 milliGRAM(s) Oral two times a day  levothyroxine 50 MICROGram(s) Oral daily  sodium chloride 1 Gram(s) Oral three times a day  tamsulosin 0.4 milliGRAM(s) Oral at bedtime    MEDICATIONS  (PRN):  acetaminophen     Tablet .. 650 milliGRAM(s) Oral every 6 hours PRN Temp greater or equal to 38C (100.4F), Mild Pain (1 - 3)  aluminum hydroxide/magnesium hydroxide/simethicone Suspension 30 milliLiter(s) Oral every 6 hours PRN Dyspepsia  chlorproMAZINE    Injectable 50 milliGRAM(s) IntraMuscular once PRN agitation  dextrose Oral Gel 15 Gram(s) Oral once PRN Blood Glucose LESS THAN 70 milliGRAM(s)/deciliter  LORazepam     Tablet 2 milliGRAM(s) Oral every 6 hours PRN Anxiety  LORazepam   Injectable 2 milliGRAM(s) IntraMuscular once PRN Agitation  magnesium hydroxide Suspension 30 milliLiter(s) Oral daily PRN Constipation  melatonin. 3 milliGRAM(s) Oral at bedtime PRN Insomnia  traZODone 50 milliGRAM(s) Oral at bedtime PRN insomnia

## 2024-07-29 NOTE — BH INPATIENT PSYCHIATRY PROGRESS NOTE - NSBHASSESSSUMMFT_PSY_ALL_CORE
57 yo M with PPhx significant for schizoaffective disorder, history of multiple inpatient psychiatric hospitalizations (last hospitalized at [facility] from 6/22-7/12/24), follows outpatient at [facility] with Dr. Cook, and PMH significant for HTN, HLD, hypothyroidism, CVID/ hypogammaglobulinemia (on monthly IVIG - Last dose Jun 16th, 2024), COPD not on home O2, hx of frequent skin infection who presented as initially to [facility] for  overdose of labetalol, but was also found to have left leg abscess vs cellulitis and was transferred on 7/22/24 for IVIG infusions (received 7/24/24).  Psychiatry evaluated Patient: "... somewhat childlike, but overall pleasant and engaged. He recalls that he on the day of discharge from [facility], he started having paranoid thoughts about people wanting to kill him and that others were out to get him. States that the paranoia was so distressing that he immediately thought of ending his life and overdosed on numerous labetalol pills. He started to feel nauseated and realized that he does want to live and immediately called the ambulance where he was taken to [facility]. Patient expresses that the negative thoughts, paranoia, suicidal thoughts were difficult for him when he returned home from his psychiatric hospitalization because he has a hard time talking to others about it. He reports that he follows with Dr. Cook at [facility] on an outpatient basis, and has been doing well with individualized treatment with her." Per chart review, patient was switched from abilify to haldol during his last hospitalization. At discharge he was taking haldol 10 mg PO BID. He received haldol decanoate 100 mg IM on 7/5 and the next loading dose of 100 mg IM on 7/10. He will be due for maintenance dose of haldol decanoate 200 mg IM on 8/8/24. Patient reports that he recalls that he received 2 of the haldol shots and the plan was to continue treatment on outpatient basis.   AT [facility]:  Left lateral leg w/ pain and purulence. Immunocompromised given hx of CVID. Currently w/o systemic signs of infection. MRSA sputum and nares + in OSH. s/p Vanc --> transitioned to Bactrim (Started on 7/13), s/p course of vanco at [facility], c/w doxycycline through 7/28; need for MRI. pt also very claustrophobic. MRI order d/c. 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 Mitchell Boxer. s/p IVIG x1 at [facility] 07/24/24. TASH -  Elevated creatine 1.8, BUN 26. Creatine in June 2024. 0.7, drug induced at [facility] vs ATN from shock;  U/A relatively bland with mild spot proteinuria; renal/bladder U/S: no hydronephrosis. hold home ARB      - last day of oral antibiotics on Sunday   - s/p IV IG infusion on 7/24/24 (monthly)   -  on haldol 5mg PO qd; Received haldol decanoate 100 mg IM on 7/5 and the next loading dose of 100 mg IM on 7/10. He will be due for maintenance dose of haldol decanoate 200 mg IM on 8/8/24.  - CL psych recommended starting Lexapro 5,mg po on 7/26/24

## 2024-07-29 NOTE — BH INPATIENT PSYCHIATRY PROGRESS NOTE - NSBHFUPINTERVALHXFT_PSY_A_CORE
f/up SAD, care discussed w/ tx team, VSS. Patient reported that he had OD due to feeling like "people wanted to hurt me". Patient reported that he started feeling nauseous and started vomiting at home, then called EMS as he felt that suicide / dying was traumatic. Patient reported that he was on Haldol decanoate. Patient reported that he was able to take care of his wound at home. Patient reported that he no longer feels that there is a conspiracy to hurt him. Patient seen by wound care nurse Alicja long AM. Patient also reported that he is no longer hearing the voices. denied SI/I/P. reported fair sleep and appetite.

## 2024-07-29 NOTE — ADVANCED PRACTICE NURSE CONSULT - REASON FOR CONSULT
Wound care/Patient with h/o of recurring MRSA abscesses including right buttock, perineum, left leg.

## 2024-07-30 LAB
GLUCOSE BLDC GLUCOMTR-MCNC: 112 MG/DL — HIGH (ref 70–99)
GLUCOSE BLDC GLUCOMTR-MCNC: 123 MG/DL — HIGH (ref 70–99)
GLUCOSE BLDC GLUCOMTR-MCNC: 145 MG/DL — HIGH (ref 70–99)
GLUCOSE BLDC GLUCOMTR-MCNC: 94 MG/DL — SIGNIFICANT CHANGE UP (ref 70–99)

## 2024-07-30 PROCEDURE — 99232 SBSQ HOSP IP/OBS MODERATE 35: CPT

## 2024-07-30 PROCEDURE — 90853 GROUP PSYCHOTHERAPY: CPT

## 2024-07-30 RX ORDER — PETROLATUM 865 MG/G
1 OINTMENT TOPICAL
Refills: 0 | Status: DISCONTINUED | OUTPATIENT
Start: 2024-07-30 | End: 2024-08-16

## 2024-07-30 RX ADMIN — Medication 40 MILLIGRAM(S): at 20:18

## 2024-07-30 RX ADMIN — ACETAMINOPHEN 650 MILLIGRAM(S): 325 TABLET ORAL at 20:17

## 2024-07-30 RX ADMIN — SODIUM CHLORIDE 1 GRAM(S): 9 INJECTION INTRAMUSCULAR; INTRAVENOUS; SUBCUTANEOUS at 12:20

## 2024-07-30 RX ADMIN — Medication 3 MILLIGRAM(S): at 21:13

## 2024-07-30 RX ADMIN — AMLODIPINE BESYLATE 10 MILLIGRAM(S): 10 TABLET ORAL at 08:41

## 2024-07-30 RX ADMIN — SODIUM CHLORIDE 1 GRAM(S): 9 INJECTION INTRAMUSCULAR; INTRAVENOUS; SUBCUTANEOUS at 20:17

## 2024-07-30 RX ADMIN — ESCITALOPRAM OXALATE 5 MILLIGRAM(S): 10 TABLET ORAL at 08:41

## 2024-07-30 RX ADMIN — Medication 5 MILLIGRAM(S): at 08:41

## 2024-07-30 RX ADMIN — Medication 50 MICROGRAM(S): at 06:32

## 2024-07-30 RX ADMIN — TAMSULOSIN HYDROCHLORIDE 0.4 MILLIGRAM(S): 0.4 CAPSULE ORAL at 20:17

## 2024-07-30 RX ADMIN — SODIUM CHLORIDE 1 GRAM(S): 9 INJECTION INTRAMUSCULAR; INTRAVENOUS; SUBCUTANEOUS at 08:42

## 2024-07-30 RX ADMIN — Medication 200 MILLIGRAM(S): at 08:41

## 2024-07-30 RX ADMIN — ACETAMINOPHEN 650 MILLIGRAM(S): 325 TABLET ORAL at 21:17

## 2024-07-30 RX ADMIN — Medication 0: at 21:13

## 2024-07-30 RX ADMIN — PETROLATUM 1 APPLICATION(S): 865 OINTMENT TOPICAL at 21:14

## 2024-07-30 NOTE — BH INPATIENT PSYCHIATRY PROGRESS NOTE - NSBHASSESSSUMMFT_PSY_ALL_CORE
57 yo M with PPhx significant for schizoaffective disorder, history of multiple inpatient psychiatric hospitalizations (last hospitalized at [facility] from 6/22-7/12/24), follows outpatient at [facility] with Dr. Cook, and PMH significant for HTN, HLD, hypothyroidism, CVID/ hypogammaglobulinemia (on monthly IVIG - Last dose Jun 16th, 2024), COPD not on home O2, hx of frequent skin infection who presented as initially to [facility] for  overdose of labetalol, but was also found to have left leg abscess vs cellulitis and was transferred on 7/22/24 for IVIG infusions (received 7/24/24).  Psychiatry evaluated Patient: "... somewhat childlike, but overall pleasant and engaged. He recalls that he on the day of discharge from [facility], he started having paranoid thoughts about people wanting to kill him and that others were out to get him. States that the paranoia was so distressing that he immediately thought of ending his life and overdosed on numerous labetalol pills. He started to feel nauseated and realized that he does want to live and immediately called the ambulance where he was taken to [facility]. Patient expresses that the negative thoughts, paranoia, suicidal thoughts were difficult for him when he returned home from his psychiatric hospitalization because he has a hard time talking to others about it. He reports that he follows with Dr. Cook at [facility] on an outpatient basis, and has been doing well with individualized treatment with her." Per chart review, patient was switched from abilify to haldol during his last hospitalization. At discharge he was taking haldol 10 mg PO BID. He received haldol decanoate 100 mg IM on 7/5 and the next loading dose of 100 mg IM on 7/10. He will be due for maintenance dose of haldol decanoate 200 mg IM on 8/8/24. Patient reports that he recalls that he received 2 of the haldol shots and the plan was to continue treatment on outpatient basis.   AT [facility]:  Left lateral leg w/ pain and purulence. Immunocompromised given hx of CVID. Currently w/o systemic signs of infection. MRSA sputum and nares + in OSH. s/p Vanc --> transitioned to Bactrim (Started on 7/13), s/p course of vanco at [facility], c/w doxycycline through 7/28; need for MRI. pt also very claustrophobic. MRI order d/c. 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 Mitchell Boxer. s/p IVIG x1 at [facility] 07/24/24. TASH -  Elevated creatine 1.8, BUN 26. Creatine in June 2024. 0.7, drug induced at [facility] vs ATN from shock;  U/A relatively bland with mild spot proteinuria; renal/bladder U/S: no hydronephrosis. hold home ARB      - last day of oral antibiotics on Sunday   - s/p IV IG infusion on 7/24/24 (monthly)   -  on haldol 5mg PO qd; Received haldol decanoate 100 mg IM on 7/5 and the next loading dose of 100 mg IM on 7/10. He will be due for maintenance dose of haldol decanoate 200 mg IM on 8/8/24.  - CL psych recommended starting Lexapro 5,mg po on 7/26/24

## 2024-07-30 NOTE — BH INPATIENT PSYCHIATRY PROGRESS NOTE - NSBHMETABOLIC_PSY_ALL_CORE_FT
BMI: BMI (kg/m2): 33.4 (07-23-24 @ 10:08)  HbA1c: A1C with Estimated Average Glucose Result: 5.8 % (07-24-24 @ 07:48)    Glucose: POCT Blood Glucose.: 94 mg/dL (07-30-24 @ 11:13)    BP: 122/84 (07-29-24 @ 20:35) (122/84 - 132/76)Vital Signs Last 24 Hrs  T(C): 36.7 (07-30-24 @ 08:20), Max: 36.7 (07-30-24 @ 08:20)  T(F): 98 (07-30-24 @ 08:20), Max: 98 (07-30-24 @ 08:20)  HR: 74 (07-29-24 @ 20:35) (74 - 74)  BP: 122/84 (07-29-24 @ 20:35) (122/84 - 122/84)  BP(mean): --  RR: --  SpO2: --    Orthostatic VS  07-30-24 @ 08:20  Lying BP: --/-- HR: --  Sitting BP: 130/85 HR: 60  Standing BP: 112/66 HR: 77  Site: --  Mode: --  Orthostatic VS  07-29-24 @ 08:32  Lying BP: --/-- HR: --  Sitting BP: 120/81 HR: 60  Standing BP: 110/70 HR: 64  Site: --  Mode: --    Lipid Panel: Date/Time: 07-24-24 @ 07:48  Cholesterol, Serum: 81  LDL Cholesterol Calculated: 34  HDL Cholesterol, Serum: 30  Total Cholesterol/HDL Ration Measurement: --  Triglycerides, Serum: 84

## 2024-07-30 NOTE — BH INPATIENT PSYCHIATRY PROGRESS NOTE - NSBHFUPINTERVALHXFT_PSY_A_CORE
f/up SAD, care discussed w/ tx team, VSS. Patient reported feeling "alright, pretty good", reported that he had OD on beta blockers due to feeling that people thinking he was a "pervert". Patient reported that he received his IGG infusion last wed or thursday prior to admission on the unit. Patient reported that he couldn't handle the paranoia on the outside. Patient out on the unit, encouraged to attend groups. no SE from meds.

## 2024-07-31 LAB
GLUCOSE BLDC GLUCOMTR-MCNC: 101 MG/DL — HIGH (ref 70–99)
GLUCOSE BLDC GLUCOMTR-MCNC: 116 MG/DL — HIGH (ref 70–99)
GLUCOSE BLDC GLUCOMTR-MCNC: 146 MG/DL — HIGH (ref 70–99)
GLUCOSE BLDC GLUCOMTR-MCNC: 95 MG/DL — SIGNIFICANT CHANGE UP (ref 70–99)

## 2024-07-31 PROCEDURE — 99232 SBSQ HOSP IP/OBS MODERATE 35: CPT

## 2024-07-31 RX ORDER — ESCITALOPRAM OXALATE 10 MG/1
10 TABLET ORAL DAILY
Refills: 0 | Status: DISCONTINUED | OUTPATIENT
Start: 2024-08-01 | End: 2024-08-28

## 2024-07-31 RX ADMIN — TAMSULOSIN HYDROCHLORIDE 0.4 MILLIGRAM(S): 0.4 CAPSULE ORAL at 20:25

## 2024-07-31 RX ADMIN — ESCITALOPRAM OXALATE 5 MILLIGRAM(S): 10 TABLET ORAL at 08:38

## 2024-07-31 RX ADMIN — Medication 40 MILLIGRAM(S): at 20:24

## 2024-07-31 RX ADMIN — Medication 200 MILLIGRAM(S): at 08:38

## 2024-07-31 RX ADMIN — Medication 5 MILLIGRAM(S): at 08:39

## 2024-07-31 RX ADMIN — SODIUM CHLORIDE 1 GRAM(S): 9 INJECTION INTRAMUSCULAR; INTRAVENOUS; SUBCUTANEOUS at 08:39

## 2024-07-31 RX ADMIN — Medication 3 MILLIGRAM(S): at 20:27

## 2024-07-31 RX ADMIN — LORAZEPAM 2 MILLIGRAM(S): 4 INJECTION INTRAMUSCULAR; INTRAVENOUS at 16:29

## 2024-07-31 RX ADMIN — AMLODIPINE BESYLATE 10 MILLIGRAM(S): 10 TABLET ORAL at 08:39

## 2024-07-31 RX ADMIN — SODIUM CHLORIDE 1 GRAM(S): 9 INJECTION INTRAMUSCULAR; INTRAVENOUS; SUBCUTANEOUS at 20:24

## 2024-07-31 RX ADMIN — Medication 50 MICROGRAM(S): at 07:00

## 2024-07-31 RX ADMIN — SODIUM CHLORIDE 1 GRAM(S): 9 INJECTION INTRAMUSCULAR; INTRAVENOUS; SUBCUTANEOUS at 12:18

## 2024-07-31 RX ADMIN — Medication 200 MILLIGRAM(S): at 20:24

## 2024-07-31 NOTE — BH INPATIENT PSYCHIATRY PROGRESS NOTE - CURRENT MEDICATION
MEDICATIONS  (STANDING):  amLODIPine   Tablet 10 milliGRAM(s) Oral daily  atorvastatin 40 milliGRAM(s) Oral at bedtime  dextrose 5%. 1000 milliLiter(s) (50 mL/Hr) IV Continuous <Continuous>  dextrose 5%. 1000 milliLiter(s) (100 mL/Hr) IV Continuous <Continuous>  dextrose 50% Injectable 25 Gram(s) IV Push once  dextrose 50% Injectable 12.5 Gram(s) IV Push once  dextrose 50% Injectable 25 Gram(s) IV Push once  escitalopram 5 milliGRAM(s) Oral daily  glucagon  Injectable 1 milliGRAM(s) IntraMuscular once  haloperidol     Tablet 5 milliGRAM(s) Oral daily  insulin lispro (ADMELOG) corrective regimen sliding scale   SubCutaneous three times a day before meals  insulin lispro (ADMELOG) corrective regimen sliding scale   SubCutaneous at bedtime  labetalol 200 milliGRAM(s) Oral two times a day  levothyroxine 50 MICROGram(s) Oral daily  sodium chloride 1 Gram(s) Oral three times a day  tamsulosin 0.4 milliGRAM(s) Oral at bedtime    MEDICATIONS  (PRN):  acetaminophen     Tablet .. 650 milliGRAM(s) Oral every 6 hours PRN Temp greater or equal to 38C (100.4F), Mild Pain (1 - 3)  aluminum hydroxide/magnesium hydroxide/simethicone Suspension 30 milliLiter(s) Oral every 6 hours PRN Dyspepsia  AQUAPHOR (petrolatum Ointment) 1 Application(s) Topical two times a day PRN dry skin  chlorproMAZINE    Injectable 50 milliGRAM(s) IntraMuscular once PRN agitation  dextrose Oral Gel 15 Gram(s) Oral once PRN Blood Glucose LESS THAN 70 milliGRAM(s)/deciliter  LORazepam     Tablet 2 milliGRAM(s) Oral every 6 hours PRN Anxiety  LORazepam   Injectable 2 milliGRAM(s) IntraMuscular once PRN Agitation  magnesium hydroxide Suspension 30 milliLiter(s) Oral daily PRN Constipation  melatonin. 3 milliGRAM(s) Oral at bedtime PRN Insomnia  traZODone 50 milliGRAM(s) Oral at bedtime PRN insomnia

## 2024-07-31 NOTE — BH INPATIENT PSYCHIATRY PROGRESS NOTE - NSBHASSESSSUMMFT_PSY_ALL_CORE
59 yo M with PPhx significant for schizoaffective disorder, history of multiple inpatient psychiatric hospitalizations (last hospitalized at [facility] from 6/22-7/12/24), follows outpatient at [facility] with Dr. Cook, and PMH significant for HTN, HLD, hypothyroidism, CVID/ hypogammaglobulinemia (on monthly IVIG - Last dose Jun 16th, 2024), COPD not on home O2, hx of frequent skin infection who presented as initially to [facility] for  overdose of labetalol, but was also found to have left leg abscess vs cellulitis and was transferred on 7/22/24 for IVIG infusions (received 7/24/24).  Psychiatry evaluated Patient: "... somewhat childlike, but overall pleasant and engaged. He recalls that he on the day of discharge from [facility], he started having paranoid thoughts about people wanting to kill him and that others were out to get him. States that the paranoia was so distressing that he immediately thought of ending his life and overdosed on numerous labetalol pills. He started to feel nauseated and realized that he does want to live and immediately called the ambulance where he was taken to [facility]. Patient expresses that the negative thoughts, paranoia, suicidal thoughts were difficult for him when he returned home from his psychiatric hospitalization because he has a hard time talking to others about it. He reports that he follows with Dr. Cook at [facility] on an outpatient basis, and has been doing well with individualized treatment with her." Per chart review, patient was switched from abilify to haldol during his last hospitalization. At discharge he was taking haldol 10 mg PO BID. He received haldol decanoate 100 mg IM on 7/5 and the next loading dose of 100 mg IM on 7/10. He will be due for maintenance dose of haldol decanoate 200 mg IM on 8/8/24. Patient reports that he recalls that he received 2 of the haldol shots and the plan was to continue treatment on outpatient basis.   AT [facility]:  Left lateral leg w/ pain and purulence. Immunocompromised given hx of CVID. Currently w/o systemic signs of infection. MRSA sputum and nares + in OSH. s/p Vanc --> transitioned to Bactrim (Started on 7/13), s/p course of vanco at [facility], c/w doxycycline through 7/28; need for MRI. pt also very claustrophobic. MRI order d/c. 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 Mitchell Boxer. s/p IVIG x1 at [facility] 07/24/24. TASH -  Elevated creatine 1.8, BUN 26. Creatine in June 2024. 0.7, drug induced at [facility] vs ATN from shock;  U/A relatively bland with mild spot proteinuria; renal/bladder U/S: no hydronephrosis. hold home ARB      - last day of oral antibiotics on Sunday   - s/p IV IG infusion on 7/24/24 (monthly)   -  on haldol 5mg PO qd; Received haldol decanoate 100 mg IM on 7/5 and the next loading dose of 100 mg IM on 7/10. He will be due for maintenance dose of haldol decanoate 200 mg IM on 8/8/24.  - CL psych recommended starting Lexapro 5,mg po on 7/26/24, increase to 10mg for 8/1

## 2024-07-31 NOTE — BH INPATIENT PSYCHIATRY PROGRESS NOTE - NSBHMETABOLIC_PSY_ALL_CORE_FT
BMI: BMI (kg/m2): 33.4 (07-23-24 @ 10:08)  HbA1c: A1C with Estimated Average Glucose Result: 5.8 % (07-24-24 @ 07:48)    Glucose: POCT Blood Glucose.: 116 mg/dL (07-31-24 @ 11:22)    BP: 139/75 (07-30-24 @ 20:25) (122/84 - 139/75)Vital Signs Last 24 Hrs  T(C): 36.8 (07-31-24 @ 08:17), Max: 36.8 (07-31-24 @ 08:17)  T(F): 98.3 (07-31-24 @ 08:17), Max: 98.3 (07-31-24 @ 08:17)  HR: 56 (07-30-24 @ 20:25) (56 - 56)  BP: 139/75 (07-30-24 @ 20:25) (139/75 - 139/75)  BP(mean): --  RR: --  SpO2: --    Orthostatic VS  07-31-24 @ 08:17  Lying BP: --/-- HR: --  Sitting BP: 130/62 HR: 65  Standing BP: 111/62 HR: 71  Site: --  Mode: --  Orthostatic VS  07-30-24 @ 08:20  Lying BP: --/-- HR: --  Sitting BP: 130/85 HR: 60  Standing BP: 112/66 HR: 77  Site: --  Mode: --    Lipid Panel: Date/Time: 07-24-24 @ 07:48  Cholesterol, Serum: 81  LDL Cholesterol Calculated: 34  HDL Cholesterol, Serum: 30  Total Cholesterol/HDL Ration Measurement: --  Triglycerides, Serum: 84

## 2024-07-31 NOTE — BH INPATIENT PSYCHIATRY PROGRESS NOTE - PRN MEDS
MEDICATIONS  (PRN):  acetaminophen     Tablet .. 650 milliGRAM(s) Oral every 6 hours PRN Temp greater or equal to 38C (100.4F), Mild Pain (1 - 3)  aluminum hydroxide/magnesium hydroxide/simethicone Suspension 30 milliLiter(s) Oral every 6 hours PRN Dyspepsia  AQUAPHOR (petrolatum Ointment) 1 Application(s) Topical two times a day PRN dry skin  chlorproMAZINE    Injectable 50 milliGRAM(s) IntraMuscular once PRN agitation  dextrose Oral Gel 15 Gram(s) Oral once PRN Blood Glucose LESS THAN 70 milliGRAM(s)/deciliter  LORazepam     Tablet 2 milliGRAM(s) Oral every 6 hours PRN Anxiety  LORazepam   Injectable 2 milliGRAM(s) IntraMuscular once PRN Agitation  magnesium hydroxide Suspension 30 milliLiter(s) Oral daily PRN Constipation  melatonin. 3 milliGRAM(s) Oral at bedtime PRN Insomnia  traZODone 50 milliGRAM(s) Oral at bedtime PRN insomnia

## 2024-08-01 LAB
GLUCOSE BLDC GLUCOMTR-MCNC: 104 MG/DL — HIGH (ref 70–99)
GLUCOSE BLDC GLUCOMTR-MCNC: 105 MG/DL — HIGH (ref 70–99)
GLUCOSE BLDC GLUCOMTR-MCNC: 106 MG/DL — HIGH (ref 70–99)
GLUCOSE BLDC GLUCOMTR-MCNC: 84 MG/DL — SIGNIFICANT CHANGE UP (ref 70–99)

## 2024-08-01 PROCEDURE — 99232 SBSQ HOSP IP/OBS MODERATE 35: CPT

## 2024-08-01 RX ORDER — LORAZEPAM 4 MG/ML
2 INJECTION INTRAMUSCULAR; INTRAVENOUS ONCE
Refills: 0 | Status: ACTIVE | OUTPATIENT
Start: 2024-08-01 | End: 2024-08-08

## 2024-08-01 RX ORDER — LORAZEPAM 4 MG/ML
2 INJECTION INTRAMUSCULAR; INTRAVENOUS EVERY 6 HOURS
Refills: 0 | Status: DISCONTINUED | OUTPATIENT
Start: 2024-08-01 | End: 2024-08-05

## 2024-08-01 RX ADMIN — TAMSULOSIN HYDROCHLORIDE 0.4 MILLIGRAM(S): 0.4 CAPSULE ORAL at 20:40

## 2024-08-01 RX ADMIN — Medication 50 MICROGRAM(S): at 06:13

## 2024-08-01 RX ADMIN — Medication 200 MILLIGRAM(S): at 20:41

## 2024-08-01 RX ADMIN — Medication 5 MILLIGRAM(S): at 08:28

## 2024-08-01 RX ADMIN — SODIUM CHLORIDE 1 GRAM(S): 9 INJECTION INTRAMUSCULAR; INTRAVENOUS; SUBCUTANEOUS at 08:28

## 2024-08-01 RX ADMIN — AMLODIPINE BESYLATE 10 MILLIGRAM(S): 10 TABLET ORAL at 08:28

## 2024-08-01 RX ADMIN — ACETAMINOPHEN 650 MILLIGRAM(S): 325 TABLET ORAL at 18:37

## 2024-08-01 RX ADMIN — SODIUM CHLORIDE 1 GRAM(S): 9 INJECTION INTRAMUSCULAR; INTRAVENOUS; SUBCUTANEOUS at 12:20

## 2024-08-01 RX ADMIN — ESCITALOPRAM OXALATE 10 MILLIGRAM(S): 10 TABLET ORAL at 08:28

## 2024-08-01 RX ADMIN — SODIUM CHLORIDE 1 GRAM(S): 9 INJECTION INTRAMUSCULAR; INTRAVENOUS; SUBCUTANEOUS at 20:43

## 2024-08-01 RX ADMIN — LORAZEPAM 2 MILLIGRAM(S): 4 INJECTION INTRAMUSCULAR; INTRAVENOUS at 20:40

## 2024-08-01 RX ADMIN — Medication 200 MILLIGRAM(S): at 08:28

## 2024-08-01 RX ADMIN — ACETAMINOPHEN 650 MILLIGRAM(S): 325 TABLET ORAL at 17:42

## 2024-08-01 RX ADMIN — Medication 3 MILLIGRAM(S): at 20:40

## 2024-08-01 RX ADMIN — Medication 40 MILLIGRAM(S): at 20:41

## 2024-08-01 NOTE — BH INPATIENT PSYCHIATRY PROGRESS NOTE - NSBHASSESSSUMMFT_PSY_ALL_CORE
57 yo M with PPhx significant for schizoaffective disorder, history of multiple inpatient psychiatric hospitalizations (last hospitalized at [facility] from 6/22-7/12/24), follows outpatient at [facility] with Dr. Cook, and PMH significant for HTN, HLD, hypothyroidism, CVID/ hypogammaglobulinemia (on monthly IVIG - Last dose Jun 16th, 2024), COPD not on home O2, hx of frequent skin infection who presented as initially to [facility] for  overdose of labetalol, but was also found to have left leg abscess vs cellulitis and was transferred on 7/22/24 for IVIG infusions (received 7/24/24).  Psychiatry evaluated Patient: "... somewhat childlike, but overall pleasant and engaged. He recalls that he on the day of discharge from [facility], he started having paranoid thoughts about people wanting to kill him and that others were out to get him. States that the paranoia was so distressing that he immediately thought of ending his life and overdosed on numerous labetalol pills. He started to feel nauseated and realized that he does want to live and immediately called the ambulance where he was taken to [facility]. Patient expresses that the negative thoughts, paranoia, suicidal thoughts were difficult for him when he returned home from his psychiatric hospitalization because he has a hard time talking to others about it. He reports that he follows with Dr. Cook at [facility] on an outpatient basis, and has been doing well with individualized treatment with her." Per chart review, patient was switched from abilify to haldol during his last hospitalization. At discharge he was taking haldol 10 mg PO BID. He received haldol decanoate 100 mg IM on 7/5 and the next loading dose of 100 mg IM on 7/10. He will be due for maintenance dose of haldol decanoate 200 mg IM on 8/8/24. Patient reports that he recalls that he received 2 of the haldol shots and the plan was to continue treatment on outpatient basis.   AT [facility]:  Left lateral leg w/ pain and purulence. Immunocompromised given hx of CVID. Currently w/o systemic signs of infection. MRSA sputum and nares + in OSH. s/p Vanc --> transitioned to Bactrim (Started on 7/13), s/p course of vanco at [facility], c/w doxycycline through 7/28; need for MRI. pt also very claustrophobic. MRI order d/c. 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 Mitchell Boxer. s/p IVIG x1 at [facility] 07/24/24. TASH -  Elevated creatine 1.8, BUN 26. Creatine in June 2024. 0.7, drug induced at [facility] vs ATN from shock;  U/A relatively bland with mild spot proteinuria; renal/bladder U/S: no hydronephrosis. hold home ARB      - last day of oral antibiotics on Sunday   - s/p IV IG infusion on 7/24/24 (monthly)   -  on haldol 5mg PO qd; Received haldol decanoate 100 mg IM on 7/5 and the next loading dose of 100 mg IM on 7/10. He will be due for maintenance dose of haldol decanoate 200 mg IM on 8/8/24.  - CL psych recommended starting Lexapro 5,mg po on 7/26/24, increase to 10mg for 8/1

## 2024-08-01 NOTE — BH INPATIENT PSYCHIATRY PROGRESS NOTE - NSBHMETABOLIC_PSY_ALL_CORE_FT
BMI: BMI (kg/m2): 33.4 (07-23-24 @ 10:08)  HbA1c: A1C with Estimated Average Glucose Result: 5.8 % (07-24-24 @ 07:48)    Glucose: POCT Blood Glucose.: 84 mg/dL (08-01-24 @ 11:33)    BP: 139/75 (07-30-24 @ 20:25) (122/84 - 139/75)Vital Signs Last 24 Hrs  T(C): 36.6 (08-01-24 @ 07:59), Max: 36.7 (07-31-24 @ 18:58)  T(F): 97.8 (08-01-24 @ 07:59), Max: 98 (07-31-24 @ 18:58)  HR: --  BP: --  BP(mean): --  RR: --  SpO2: --    Orthostatic VS  08-01-24 @ 07:59  Lying BP: --/-- HR: --  Sitting BP: 135/76 HR: 66  Standing BP: 116/58 HR: 79  Site: --  Mode: --  Orthostatic VS  07-31-24 @ 18:58  Lying BP: --/-- HR: --  Sitting BP: 133/69 HR: 73  Standing BP: --/-- HR: --  Site: --  Mode: --  Orthostatic VS  07-31-24 @ 08:17  Lying BP: --/-- HR: --  Sitting BP: 130/62 HR: 65  Standing BP: 111/62 HR: 71  Site: --  Mode: --    Lipid Panel: Date/Time: 07-24-24 @ 07:48  Cholesterol, Serum: 81  LDL Cholesterol Calculated: 34  HDL Cholesterol, Serum: 30  Total Cholesterol/HDL Ration Measurement: --  Triglycerides, Serum: 84

## 2024-08-01 NOTE — BH INPATIENT PSYCHIATRY PROGRESS NOTE - CURRENT MEDICATION
MEDICATIONS  (STANDING):  amLODIPine   Tablet 10 milliGRAM(s) Oral daily  atorvastatin 40 milliGRAM(s) Oral at bedtime  dextrose 5%. 1000 milliLiter(s) (50 mL/Hr) IV Continuous <Continuous>  dextrose 5%. 1000 milliLiter(s) (100 mL/Hr) IV Continuous <Continuous>  dextrose 50% Injectable 25 Gram(s) IV Push once  dextrose 50% Injectable 12.5 Gram(s) IV Push once  dextrose 50% Injectable 25 Gram(s) IV Push once  escitalopram 10 milliGRAM(s) Oral daily  glucagon  Injectable 1 milliGRAM(s) IntraMuscular once  haloperidol     Tablet 5 milliGRAM(s) Oral daily  insulin lispro (ADMELOG) corrective regimen sliding scale   SubCutaneous three times a day before meals  insulin lispro (ADMELOG) corrective regimen sliding scale   SubCutaneous at bedtime  labetalol 200 milliGRAM(s) Oral two times a day  levothyroxine 50 MICROGram(s) Oral daily  sodium chloride 1 Gram(s) Oral three times a day  tamsulosin 0.4 milliGRAM(s) Oral at bedtime    MEDICATIONS  (PRN):  acetaminophen     Tablet .. 650 milliGRAM(s) Oral every 6 hours PRN Temp greater or equal to 38C (100.4F), Mild Pain (1 - 3)  aluminum hydroxide/magnesium hydroxide/simethicone Suspension 30 milliLiter(s) Oral every 6 hours PRN Dyspepsia  AQUAPHOR (petrolatum Ointment) 1 Application(s) Topical two times a day PRN dry skin  chlorproMAZINE    Injectable 50 milliGRAM(s) IntraMuscular once PRN agitation  dextrose Oral Gel 15 Gram(s) Oral once PRN Blood Glucose LESS THAN 70 milliGRAM(s)/deciliter  LORazepam     Tablet 2 milliGRAM(s) Oral every 6 hours PRN Anxiety  LORazepam   Injectable 2 milliGRAM(s) IntraMuscular once PRN Agitation  magnesium hydroxide Suspension 30 milliLiter(s) Oral daily PRN Constipation  melatonin. 3 milliGRAM(s) Oral at bedtime PRN Insomnia  traZODone 50 milliGRAM(s) Oral at bedtime PRN insomnia

## 2024-08-01 NOTE — BH INPATIENT PSYCHIATRY PROGRESS NOTE - NSBHFUPINTERVALHXFT_PSY_A_CORE
f/up SAD, care discussed w/ tx team, vitals slight orthostasis, asymptomatic, encouraged to attend groups, reported he felt slight paranoia yesterday, which was manageable. Patient denied feeling depressed or hopeless, taking meds, no SE reported. tolerating increase in Lexapro. reported being able to sleep well and with good appetite. reports that he avoids music groups.

## 2024-08-02 LAB
GLUCOSE BLDC GLUCOMTR-MCNC: 107 MG/DL — HIGH (ref 70–99)
GLUCOSE BLDC GLUCOMTR-MCNC: 160 MG/DL — HIGH (ref 70–99)
GLUCOSE BLDC GLUCOMTR-MCNC: 90 MG/DL — SIGNIFICANT CHANGE UP (ref 70–99)
GLUCOSE BLDC GLUCOMTR-MCNC: 91 MG/DL — SIGNIFICANT CHANGE UP (ref 70–99)

## 2024-08-02 PROCEDURE — 99232 SBSQ HOSP IP/OBS MODERATE 35: CPT

## 2024-08-02 RX ORDER — HALOPERIDOL 1 MG
2.5 TABLET ORAL DAILY
Refills: 0 | Status: DISCONTINUED | OUTPATIENT
Start: 2024-08-03 | End: 2024-08-08

## 2024-08-02 RX ORDER — HALOPERIDOL 1 MG
2.5 TABLET ORAL AT BEDTIME
Refills: 0 | Status: DISCONTINUED | OUTPATIENT
Start: 2024-08-02 | End: 2024-08-05

## 2024-08-02 RX ADMIN — SODIUM CHLORIDE 1 GRAM(S): 9 INJECTION INTRAMUSCULAR; INTRAVENOUS; SUBCUTANEOUS at 20:21

## 2024-08-02 RX ADMIN — Medication 200 MILLIGRAM(S): at 20:21

## 2024-08-02 RX ADMIN — ESCITALOPRAM OXALATE 10 MILLIGRAM(S): 10 TABLET ORAL at 08:27

## 2024-08-02 RX ADMIN — Medication 2.5 MILLIGRAM(S): at 20:21

## 2024-08-02 RX ADMIN — TAMSULOSIN HYDROCHLORIDE 0.4 MILLIGRAM(S): 0.4 CAPSULE ORAL at 20:20

## 2024-08-02 RX ADMIN — LORAZEPAM 2 MILLIGRAM(S): 4 INJECTION INTRAMUSCULAR; INTRAVENOUS at 14:14

## 2024-08-02 RX ADMIN — Medication 1: at 17:13

## 2024-08-02 RX ADMIN — SODIUM CHLORIDE 1 GRAM(S): 9 INJECTION INTRAMUSCULAR; INTRAVENOUS; SUBCUTANEOUS at 08:27

## 2024-08-02 RX ADMIN — ACETAMINOPHEN 650 MILLIGRAM(S): 325 TABLET ORAL at 19:37

## 2024-08-02 RX ADMIN — ACETAMINOPHEN 650 MILLIGRAM(S): 325 TABLET ORAL at 20:25

## 2024-08-02 RX ADMIN — Medication 5 MILLIGRAM(S): at 08:27

## 2024-08-02 RX ADMIN — AMLODIPINE BESYLATE 10 MILLIGRAM(S): 10 TABLET ORAL at 08:26

## 2024-08-02 RX ADMIN — Medication 50 MICROGRAM(S): at 07:02

## 2024-08-02 RX ADMIN — SODIUM CHLORIDE 1 GRAM(S): 9 INJECTION INTRAMUSCULAR; INTRAVENOUS; SUBCUTANEOUS at 13:25

## 2024-08-02 RX ADMIN — Medication 0: at 20:22

## 2024-08-02 RX ADMIN — Medication 40 MILLIGRAM(S): at 20:20

## 2024-08-02 RX ADMIN — Medication 200 MILLIGRAM(S): at 08:27

## 2024-08-02 NOTE — BH INPATIENT PSYCHIATRY PROGRESS NOTE - NSBHMETABOLIC_PSY_ALL_CORE_FT
BMI: BMI (kg/m2): 33.4 (07-23-24 @ 10:08)  HbA1c: A1C with Estimated Average Glucose Result: 5.8 % (07-24-24 @ 07:48)    Glucose: POCT Blood Glucose.: 90 mg/dL (08-02-24 @ 11:11)    BP: 139/75 (07-30-24 @ 20:25) (139/75 - 139/75)Vital Signs Last 24 Hrs  T(C): 36.8 (08-02-24 @ 08:14), Max: 36.8 (08-02-24 @ 08:14)  T(F): 98.2 (08-02-24 @ 08:14), Max: 98.2 (08-02-24 @ 08:14)  HR: --  BP: --  BP(mean): --  RR: --  SpO2: --    Orthostatic VS  08-02-24 @ 08:14  Lying BP: --/-- HR: --  Sitting BP: 141/77 HR: 60  Standing BP: --/-- HR: --  Site: --  Mode: --  Orthostatic VS  08-01-24 @ 18:55  Lying BP: --/-- HR: --  Sitting BP: 140/80 HR: 66  Standing BP: 138/82 HR: 66  Site: --  Mode: --  Orthostatic VS  08-01-24 @ 07:59  Lying BP: --/-- HR: --  Sitting BP: 135/76 HR: 66  Standing BP: 116/58 HR: 79  Site: --  Mode: --  Orthostatic VS  07-31-24 @ 18:58  Lying BP: --/-- HR: --  Sitting BP: 133/69 HR: 73  Standing BP: --/-- HR: --  Site: --  Mode: --    Lipid Panel: Date/Time: 07-24-24 @ 07:48  Cholesterol, Serum: 81  LDL Cholesterol Calculated: 34  HDL Cholesterol, Serum: 30  Total Cholesterol/HDL Ration Measurement: --  Triglycerides, Serum: 84

## 2024-08-02 NOTE — BH INPATIENT PSYCHIATRY PROGRESS NOTE - NSBHASSESSSUMMFT_PSY_ALL_CORE
59 yo M with PPhx significant for schizoaffective disorder, history of multiple inpatient psychiatric hospitalizations (last hospitalized at [facility] from 6/22-7/12/24), follows outpatient at [facility] with Dr. Cook, and PMH significant for HTN, HLD, hypothyroidism, CVID/ hypogammaglobulinemia (on monthly IVIG - Last dose Jun 16th, 2024), COPD not on home O2, hx of frequent skin infection who presented as initially to [facility] for  overdose of labetalol, but was also found to have left leg abscess vs cellulitis and was transferred on 7/22/24 for IVIG infusions (received 7/24/24).  Psychiatry evaluated Patient: "... somewhat childlike, but overall pleasant and engaged. He recalls that he on the day of discharge from [facility], he started having paranoid thoughts about people wanting to kill him and that others were out to get him. States that the paranoia was so distressing that he immediately thought of ending his life and overdosed on numerous labetalol pills. He started to feel nauseated and realized that he does want to live and immediately called the ambulance where he was taken to [facility]. Patient expresses that the negative thoughts, paranoia, suicidal thoughts were difficult for him when he returned home from his psychiatric hospitalization because he has a hard time talking to others about it. He reports that he follows with Dr. Cook at [facility] on an outpatient basis, and has been doing well with individualized treatment with her." Per chart review, patient was switched from abilify to haldol during his last hospitalization. At discharge he was taking haldol 10 mg PO BID. He received haldol decanoate 100 mg IM on 7/5 and the next loading dose of 100 mg IM on 7/10. He will be due for maintenance dose of haldol decanoate 200 mg IM on 8/8/24. Patient reports that he recalls that he received 2 of the haldol shots and the plan was to continue treatment on outpatient basis.   AT [facility]:  Left lateral leg w/ pain and purulence. Immunocompromised given hx of CVID. Currently w/o systemic signs of infection. MRSA sputum and nares + in OSH. s/p Vanc --> transitioned to Bactrim (Started on 7/13), s/p course of vanco at [facility], c/w doxycycline through 7/28; need for MRI. pt also very claustrophobic. MRI order d/c. 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 Mitchell Boxer. s/p IVIG x1 at [facility] 07/24/24. TASH -  Elevated creatine 1.8, BUN 26. Creatine in June 2024. 0.7, drug induced at [facility] vs ATN from shock;  U/A relatively bland with mild spot proteinuria; renal/bladder U/S: no hydronephrosis. hold home ARB      - last day of oral antibiotics on Sunday   - s/p IV IG infusion on 7/24/24 (monthly)   -  decrease to haldol 2.5mg daily and add 2.5mg hs,  Received haldol decanoate 100 mg IM on 7/5 and the next loading dose of 100 mg IM on 7/10. He will be due for maintenance dose of haldol decanoate 200 mg IM on 8/8/24.  - [facility] psych recommended starting Lexapro 5,mg po on 7/26/24, increase to 10mg for 8/1

## 2024-08-02 NOTE — BH INPATIENT PSYCHIATRY PROGRESS NOTE - NSBHFUPINTERVALHXFT_PSY_A_CORE
f/up SAD, care discussed w/ tx team, vitals stable, Patient reported taking ativan prn (verified with MAR) due to having slight paranoid thoughts last night and was able to sleep well after. Patient denied feeling depressed or hopeless/  helpless. Patient reported fair appetite. Patient was able to attend psychology groups this AM. reported that his mood was much better, brighter. alert and oriented x 4.

## 2024-08-03 LAB
GLUCOSE BLDC GLUCOMTR-MCNC: 106 MG/DL — HIGH (ref 70–99)
GLUCOSE BLDC GLUCOMTR-MCNC: 89 MG/DL — SIGNIFICANT CHANGE UP (ref 70–99)
GLUCOSE BLDC GLUCOMTR-MCNC: 92 MG/DL — SIGNIFICANT CHANGE UP (ref 70–99)
GLUCOSE BLDC GLUCOMTR-MCNC: 98 MG/DL — SIGNIFICANT CHANGE UP (ref 70–99)

## 2024-08-03 RX ORDER — IBUPROFEN 600 MG
400 TABLET ORAL ONCE
Refills: 0 | Status: COMPLETED | OUTPATIENT
Start: 2024-08-03 | End: 2024-08-03

## 2024-08-03 RX ADMIN — ACETAMINOPHEN 650 MILLIGRAM(S): 325 TABLET ORAL at 23:25

## 2024-08-03 RX ADMIN — ACETAMINOPHEN 650 MILLIGRAM(S): 325 TABLET ORAL at 21:33

## 2024-08-03 RX ADMIN — SODIUM CHLORIDE 1 GRAM(S): 9 INJECTION INTRAMUSCULAR; INTRAVENOUS; SUBCUTANEOUS at 08:23

## 2024-08-03 RX ADMIN — Medication 50 MICROGRAM(S): at 06:24

## 2024-08-03 RX ADMIN — SODIUM CHLORIDE 1 GRAM(S): 9 INJECTION INTRAMUSCULAR; INTRAVENOUS; SUBCUTANEOUS at 20:05

## 2024-08-03 RX ADMIN — SODIUM CHLORIDE 1 GRAM(S): 9 INJECTION INTRAMUSCULAR; INTRAVENOUS; SUBCUTANEOUS at 12:05

## 2024-08-03 RX ADMIN — AMLODIPINE BESYLATE 10 MILLIGRAM(S): 10 TABLET ORAL at 08:22

## 2024-08-03 RX ADMIN — ESCITALOPRAM OXALATE 10 MILLIGRAM(S): 10 TABLET ORAL at 08:22

## 2024-08-03 RX ADMIN — TAMSULOSIN HYDROCHLORIDE 0.4 MILLIGRAM(S): 0.4 CAPSULE ORAL at 20:05

## 2024-08-03 RX ADMIN — Medication 0: at 20:13

## 2024-08-03 RX ADMIN — Medication 3 MILLIGRAM(S): at 20:07

## 2024-08-03 RX ADMIN — Medication 200 MILLIGRAM(S): at 20:05

## 2024-08-03 RX ADMIN — Medication 2.5 MILLIGRAM(S): at 08:22

## 2024-08-03 RX ADMIN — Medication 400 MILLIGRAM(S): at 23:33

## 2024-08-03 RX ADMIN — Medication 40 MILLIGRAM(S): at 20:05

## 2024-08-03 RX ADMIN — Medication 2.5 MILLIGRAM(S): at 20:05

## 2024-08-03 RX ADMIN — Medication 200 MILLIGRAM(S): at 08:22

## 2024-08-04 LAB
GLUCOSE BLDC GLUCOMTR-MCNC: 102 MG/DL — HIGH (ref 70–99)
GLUCOSE BLDC GLUCOMTR-MCNC: 108 MG/DL — HIGH (ref 70–99)
GLUCOSE BLDC GLUCOMTR-MCNC: 91 MG/DL — SIGNIFICANT CHANGE UP (ref 70–99)
GLUCOSE BLDC GLUCOMTR-MCNC: 91 MG/DL — SIGNIFICANT CHANGE UP (ref 70–99)

## 2024-08-04 RX ORDER — IBUPROFEN 600 MG
400 TABLET ORAL ONCE
Refills: 0 | Status: COMPLETED | OUTPATIENT
Start: 2024-08-04 | End: 2024-08-04

## 2024-08-04 RX ADMIN — Medication 200 MILLIGRAM(S): at 20:42

## 2024-08-04 RX ADMIN — Medication 40 MILLIGRAM(S): at 20:42

## 2024-08-04 RX ADMIN — TAMSULOSIN HYDROCHLORIDE 0.4 MILLIGRAM(S): 0.4 CAPSULE ORAL at 20:43

## 2024-08-04 RX ADMIN — Medication 3 MILLIGRAM(S): at 20:43

## 2024-08-04 RX ADMIN — Medication 50 MICROGRAM(S): at 05:49

## 2024-08-04 RX ADMIN — ESCITALOPRAM OXALATE 10 MILLIGRAM(S): 10 TABLET ORAL at 08:34

## 2024-08-04 RX ADMIN — Medication 2.5 MILLIGRAM(S): at 08:33

## 2024-08-04 RX ADMIN — SODIUM CHLORIDE 1 GRAM(S): 9 INJECTION INTRAMUSCULAR; INTRAVENOUS; SUBCUTANEOUS at 20:43

## 2024-08-04 RX ADMIN — Medication 0: at 20:50

## 2024-08-04 RX ADMIN — ACETAMINOPHEN 650 MILLIGRAM(S): 325 TABLET ORAL at 20:53

## 2024-08-04 RX ADMIN — Medication 400 MILLIGRAM(S): at 21:41

## 2024-08-04 RX ADMIN — Medication 400 MILLIGRAM(S): at 21:42

## 2024-08-04 RX ADMIN — ACETAMINOPHEN 650 MILLIGRAM(S): 325 TABLET ORAL at 20:43

## 2024-08-04 RX ADMIN — Medication 2.5 MILLIGRAM(S): at 20:43

## 2024-08-04 RX ADMIN — SODIUM CHLORIDE 1 GRAM(S): 9 INJECTION INTRAMUSCULAR; INTRAVENOUS; SUBCUTANEOUS at 12:45

## 2024-08-04 RX ADMIN — SODIUM CHLORIDE 1 GRAM(S): 9 INJECTION INTRAMUSCULAR; INTRAVENOUS; SUBCUTANEOUS at 08:33

## 2024-08-05 LAB
GLUCOSE BLDC GLUCOMTR-MCNC: 100 MG/DL — HIGH (ref 70–99)
GLUCOSE BLDC GLUCOMTR-MCNC: 122 MG/DL — HIGH (ref 70–99)
GLUCOSE BLDC GLUCOMTR-MCNC: 132 MG/DL — HIGH (ref 70–99)
GLUCOSE BLDC GLUCOMTR-MCNC: 78 MG/DL — SIGNIFICANT CHANGE UP (ref 70–99)

## 2024-08-05 PROCEDURE — 90853 GROUP PSYCHOTHERAPY: CPT

## 2024-08-05 PROCEDURE — 99232 SBSQ HOSP IP/OBS MODERATE 35: CPT

## 2024-08-05 RX ORDER — LORAZEPAM 4 MG/ML
1 INJECTION INTRAMUSCULAR; INTRAVENOUS EVERY 6 HOURS
Refills: 0 | Status: DISCONTINUED | OUTPATIENT
Start: 2024-08-05 | End: 2024-08-09

## 2024-08-05 RX ORDER — HALOPERIDOL 1 MG
2.5 TABLET ORAL EVERY 6 HOURS
Refills: 0 | Status: DISCONTINUED | OUTPATIENT
Start: 2024-08-05 | End: 2024-08-28

## 2024-08-05 RX ORDER — HALOPERIDOL 1 MG
5 TABLET ORAL AT BEDTIME
Refills: 0 | Status: DISCONTINUED | OUTPATIENT
Start: 2024-08-05 | End: 2024-08-16

## 2024-08-05 RX ADMIN — SODIUM CHLORIDE 1 GRAM(S): 9 INJECTION INTRAMUSCULAR; INTRAVENOUS; SUBCUTANEOUS at 08:16

## 2024-08-05 RX ADMIN — Medication 5 MILLIGRAM(S): at 20:18

## 2024-08-05 RX ADMIN — Medication 2.5 MILLIGRAM(S): at 08:17

## 2024-08-05 RX ADMIN — TAMSULOSIN HYDROCHLORIDE 0.4 MILLIGRAM(S): 0.4 CAPSULE ORAL at 20:18

## 2024-08-05 RX ADMIN — ESCITALOPRAM OXALATE 10 MILLIGRAM(S): 10 TABLET ORAL at 08:17

## 2024-08-05 RX ADMIN — Medication 3 MILLIGRAM(S): at 20:20

## 2024-08-05 RX ADMIN — LORAZEPAM 1 MILLIGRAM(S): 4 INJECTION INTRAMUSCULAR; INTRAVENOUS at 15:20

## 2024-08-05 RX ADMIN — Medication 200 MILLIGRAM(S): at 20:18

## 2024-08-05 RX ADMIN — SODIUM CHLORIDE 1 GRAM(S): 9 INJECTION INTRAMUSCULAR; INTRAVENOUS; SUBCUTANEOUS at 13:06

## 2024-08-05 RX ADMIN — SODIUM CHLORIDE 1 GRAM(S): 9 INJECTION INTRAMUSCULAR; INTRAVENOUS; SUBCUTANEOUS at 20:21

## 2024-08-05 RX ADMIN — Medication 50 MICROGRAM(S): at 05:24

## 2024-08-05 RX ADMIN — Medication 40 MILLIGRAM(S): at 20:18

## 2024-08-05 NOTE — BH INPATIENT PSYCHIATRY PROGRESS NOTE - NSBHMETABOLIC_PSY_ALL_CORE_FT
BMI: BMI (kg/m2): 33.4 (07-23-24 @ 10:08)  HbA1c: A1C with Estimated Average Glucose Result: 5.8 % (07-24-24 @ 07:48)    Glucose: POCT Blood Glucose.: 78 mg/dL (08-05-24 @ 11:52)    BP: 143/75 (08-02-24 @ 19:21) (143/75 - 143/75)Vital Signs Last 24 Hrs  T(C): 36.3 (08-05-24 @ 08:48), Max: 36.5 (08-04-24 @ 19:32)  T(F): 97.4 (08-05-24 @ 08:48), Max: 97.7 (08-04-24 @ 19:32)  HR: --  BP: --  BP(mean): --  RR: --  SpO2: --    Orthostatic VS  08-05-24 @ 08:48  Lying BP: --/-- HR: --  Sitting BP: 121/69 HR: 62  Standing BP: 118/66 HR: 79  Site: --  Mode: --  Orthostatic VS  08-04-24 @ 19:32  Lying BP: --/-- HR: --  Sitting BP: 142/77 HR: 69  Standing BP: 127/67 HR: 68  Site: upper left arm  Mode: electronic  Orthostatic VS  08-04-24 @ 08:15  Lying BP: --/-- HR: --  Sitting BP: 120/70 HR: 62  Standing BP: 108/60 HR: 72  Site: --  Mode: --  Orthostatic VS  08-03-24 @ 19:04  Lying BP: --/-- HR: --  Sitting BP: 145/70 HR: 63  Standing BP: --/-- HR: --  Site: --  Mode: --    Lipid Panel: Date/Time: 07-24-24 @ 07:48  Cholesterol, Serum: 81  LDL Cholesterol Calculated: 34  HDL Cholesterol, Serum: 30  Total Cholesterol/HDL Ration Measurement: --  Triglycerides, Serum: 84   BMI: BMI (kg/m2): 33.4 (07-23-24 @ 10:08)  HbA1c: A1C with Estimated Average Glucose Result: 5.8 % (07-24-24 @ 07:48)    Glucose: POCT Blood Glucose.: 132 mg/dL (08-05-24 @ 16:35)    BP: 143/75 (08-02-24 @ 19:21) (143/75 - 143/75)Vital Signs Last 24 Hrs  T(C): 36.3 (08-05-24 @ 08:48), Max: 36.5 (08-04-24 @ 19:32)  T(F): 97.4 (08-05-24 @ 08:48), Max: 97.7 (08-04-24 @ 19:32)  HR: --  BP: --  BP(mean): --  RR: --  SpO2: --    Orthostatic VS  08-05-24 @ 08:48  Lying BP: --/-- HR: --  Sitting BP: 121/69 HR: 62  Standing BP: 118/66 HR: 79  Site: --  Mode: --  Orthostatic VS  08-04-24 @ 19:32  Lying BP: --/-- HR: --  Sitting BP: 142/77 HR: 69  Standing BP: 127/67 HR: 68  Site: upper left arm  Mode: electronic  Orthostatic VS  08-04-24 @ 08:15  Lying BP: --/-- HR: --  Sitting BP: 120/70 HR: 62  Standing BP: 108/60 HR: 72  Site: --  Mode: --  Orthostatic VS  08-03-24 @ 19:04  Lying BP: --/-- HR: --  Sitting BP: 145/70 HR: 63  Standing BP: --/-- HR: --  Site: --  Mode: --    Lipid Panel: Date/Time: 07-24-24 @ 07:48  Cholesterol, Serum: 81  LDL Cholesterol Calculated: 34  HDL Cholesterol, Serum: 30  Total Cholesterol/HDL Ration Measurement: --  Triglycerides, Serum: 84

## 2024-08-05 NOTE — BH INPATIENT PSYCHIATRY PROGRESS NOTE - PRN MEDS
MEDICATIONS  (PRN):  acetaminophen     Tablet .. 650 milliGRAM(s) Oral every 6 hours PRN Temp greater or equal to 38C (100.4F), Mild Pain (1 - 3)  aluminum hydroxide/magnesium hydroxide/simethicone Suspension 30 milliLiter(s) Oral every 6 hours PRN Dyspepsia  AQUAPHOR (petrolatum Ointment) 1 Application(s) Topical two times a day PRN dry skin  chlorproMAZINE    Injectable 50 milliGRAM(s) IntraMuscular once PRN agitation  dextrose Oral Gel 15 Gram(s) Oral once PRN Blood Glucose LESS THAN 70 milliGRAM(s)/deciliter  LORazepam     Tablet 2 milliGRAM(s) Oral every 6 hours PRN Anxiety  LORazepam   Injectable 2 milliGRAM(s) IntraMuscular once PRN Agitation  magnesium hydroxide Suspension 30 milliLiter(s) Oral daily PRN Constipation  melatonin. 3 milliGRAM(s) Oral at bedtime PRN Insomnia  traZODone 50 milliGRAM(s) Oral at bedtime PRN insomnia   MEDICATIONS  (PRN):  acetaminophen     Tablet .. 650 milliGRAM(s) Oral every 6 hours PRN Temp greater or equal to 38C (100.4F), Mild Pain (1 - 3)  aluminum hydroxide/magnesium hydroxide/simethicone Suspension 30 milliLiter(s) Oral every 6 hours PRN Dyspepsia  AQUAPHOR (petrolatum Ointment) 1 Application(s) Topical two times a day PRN dry skin  chlorproMAZINE    Injectable 50 milliGRAM(s) IntraMuscular once PRN agitation  dextrose Oral Gel 15 Gram(s) Oral once PRN Blood Glucose LESS THAN 70 milliGRAM(s)/deciliter  LORazepam     Tablet 1 milliGRAM(s) Oral every 6 hours PRN Anxiety  LORazepam   Injectable 2 milliGRAM(s) IntraMuscular once PRN Agitation  magnesium hydroxide Suspension 30 milliLiter(s) Oral daily PRN Constipation  melatonin. 3 milliGRAM(s) Oral at bedtime PRN Insomnia  traZODone 50 milliGRAM(s) Oral at bedtime PRN insomnia

## 2024-08-05 NOTE — BH INPATIENT PSYCHIATRY PROGRESS NOTE - NSBHFUPINTERVALHXFT_PSY_A_CORE
f/up SAD, care discussed w/ tx team, vitals stable, Patient appeared bright sitting in dayroom, stated he was waiting for groups. stated that he gets infusions at [address] [place] [address] with Dr. Boxer. reported that he is able to arrange transportation to go there for these infusions. Patient reported fair sleep and appetite. reported taking motrin for arthritic neck pain and no visible injury or echymosis, motrin helping. Patient reported no paranoia since last week.  f/up SAD, care discussed w/ tx team, vitals stable, Patient appeared bright sitting in dayroom, stated he was waiting for groups. stated that he gets infusions at [address] [place] [address] with Dr. Boxer. reported that he is able to arrange transportation to go there for these infusions. Patient reported fair sleep and appetite. reported taking motrin for arthritic neck pain and no visible injury or echymosis, motrin helping. Patient reported no paranoia since last week. in the afternoon, patient observed in bed, stated he felt guilt about his fathers' death 25 yrs ago and started voicing paranoia as well as thoughts about him molesting his cousin. Patient took prn ativan and writer instructed patient to come out into the dayroom to distract from those thoughts.

## 2024-08-05 NOTE — BH INPATIENT PSYCHIATRY PROGRESS NOTE - NSBHASSESSSUMMFT_PSY_ALL_CORE
59 yo M with PPhx significant for schizoaffective disorder, history of multiple inpatient psychiatric hospitalizations (last hospitalized at [facility] from 6/22-7/12/24), follows outpatient at [facility] with Dr. Cook, and PMH significant for HTN, HLD, hypothyroidism, CVID/ hypogammaglobulinemia (on monthly IVIG - Last dose Jun 16th, 2024), COPD not on home O2, hx of frequent skin infection who presented as initially to [facility] for  overdose of labetalol, but was also found to have left leg abscess vs cellulitis and was transferred on 7/22/24 for IVIG infusions (received 7/24/24).  Psychiatry evaluated Patient: "... somewhat childlike, but overall pleasant and engaged. He recalls that he on the day of discharge from [facility], he started having paranoid thoughts about people wanting to kill him and that others were out to get him. States that the paranoia was so distressing that he immediately thought of ending his life and overdosed on numerous labetalol pills. He started to feel nauseated and realized that he does want to live and immediately called the ambulance where he was taken to [facility]. Patient expresses that the negative thoughts, paranoia, suicidal thoughts were difficult for him when he returned home from his psychiatric hospitalization because he has a hard time talking to others about it. He reports that he follows with Dr. Cook at [facility] on an outpatient basis, and has been doing well with individualized treatment with her." Per chart review, patient was switched from abilify to haldol during his last hospitalization. At discharge he was taking haldol 10 mg PO BID. He received haldol decanoate 100 mg IM on 7/5 and the next loading dose of 100 mg IM on 7/10. He will be due for maintenance dose of haldol decanoate 200 mg IM on 8/8/24. Patient reports that he recalls that he received 2 of the haldol shots and the plan was to continue treatment on outpatient basis.   AT [facility]:  Left lateral leg w/ pain and purulence. Immunocompromised given hx of CVID. Currently w/o systemic signs of infection. MRSA sputum and nares + in OSH. s/p Vanc --> transitioned to Bactrim (Started on 7/13), s/p course of vanco at [facility], c/w doxycycline through 7/28; need for MRI. pt also very claustrophobic. MRI order d/c. 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 Mitchell Boxer. s/p IVIG x1 at [facility] 07/24/24. TASH -  Elevated creatine 1.8, BUN 26. Creatine in June 2024. 0.7, drug induced at [facility] vs ATN from shock;  U/A relatively bland with mild spot proteinuria; renal/bladder U/S: no hydronephrosis. hold home ARB      - last day of oral antibiotics on Sunday   - s/p IV IG infusion on 7/24/24 (monthly)   -  decrease to haldol 2.5mg daily and add 2.5mg hs--change to 5mg hs,  Received haldol decanoate 100 mg IM on 7/5 and the next loading dose of 100 mg IM on 7/10. He will be due for maintenance dose of haldol decanoate 200 mg IM on 8/8/24.  - [facility] psych recommended starting Lexapro 5,mg po on 7/26/24, increase to 10mg for 8/1   57 yo M with PPhx significant for schizoaffective disorder, history of multiple inpatient psychiatric hospitalizations (last hospitalized at [facility] from 6/22-7/12/24), follows outpatient at [facility] with Dr. Cook, and PMH significant for HTN, HLD, hypothyroidism, CVID/ hypogammaglobulinemia (on monthly IVIG - Last dose Jun 16th, 2024), COPD not on home O2, hx of frequent skin infection who presented as initially to [facility] for  overdose of labetalol, but was also found to have left leg abscess vs cellulitis and was transferred on 7/22/24 for IVIG infusions (received 7/24/24).  Psychiatry evaluated Patient: "... somewhat childlike, but overall pleasant and engaged. He recalls that he on the day of discharge from [facility], he started having paranoid thoughts about people wanting to kill him and that others were out to get him. States that the paranoia was so distressing that he immediately thought of ending his life and overdosed on numerous labetalol pills. He started to feel nauseated and realized that he does want to live and immediately called the ambulance where he was taken to [facility]. Patient expresses that the negative thoughts, paranoia, suicidal thoughts were difficult for him when he returned home from his psychiatric hospitalization because he has a hard time talking to others about it. He reports that he follows with Dr. Cook at [facility] on an outpatient basis, and has been doing well with individualized treatment with her." Per chart review, patient was switched from abilify to haldol during his last hospitalization. At discharge he was taking haldol 10 mg PO BID. He received haldol decanoate 100 mg IM on 7/5 and the next loading dose of 100 mg IM on 7/10. He will be due for maintenance dose of haldol decanoate 200 mg IM on 8/8/24. Patient reports that he recalls that he received 2 of the haldol shots and the plan was to continue treatment on outpatient basis.   AT [facility]:  Left lateral leg w/ pain and purulence. Immunocompromised given hx of CVID. Currently w/o systemic signs of infection. MRSA sputum and nares + in OSH. s/p Vanc --> transitioned to Bactrim (Started on 7/13), s/p course of vanco at [facility], c/w doxycycline through 7/28; need for MRI. pt also very claustrophobic. MRI order d/c. 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 Mitchell Boxer. s/p IVIG x1 at [facility] 07/24/24. TASH -  Elevated creatine 1.8, BUN 26. Creatine in June 2024. 0.7, drug induced at [facility] vs ATN from shock;  U/A relatively bland with mild spot proteinuria; renal/bladder U/S: no hydronephrosis. hold home ARB      - last day of oral antibiotics on Sunday   - s/p IV IG infusion on 7/24/24 (monthly)   -  maintain haldol 2.5mg daily, increase haldol to 5mg hs,  Received haldol decanoate 100 mg IM on 7/5 and the next loading dose of 100 mg IM on 7/10. He will be due for maintenance dose of haldol decanoate 200 mg IM on 8/8/24.  - [facility] psych recommended starting Lexapro 5,mg po on 7/26/24, increase to 10mg for 8/1

## 2024-08-05 NOTE — BH INPATIENT PSYCHIATRY PROGRESS NOTE - CURRENT MEDICATION
MEDICATIONS  (STANDING):  amLODIPine   Tablet 10 milliGRAM(s) Oral daily  atorvastatin 40 milliGRAM(s) Oral at bedtime  dextrose 5%. 1000 milliLiter(s) (50 mL/Hr) IV Continuous <Continuous>  dextrose 5%. 1000 milliLiter(s) (100 mL/Hr) IV Continuous <Continuous>  dextrose 50% Injectable 25 Gram(s) IV Push once  dextrose 50% Injectable 12.5 Gram(s) IV Push once  dextrose 50% Injectable 25 Gram(s) IV Push once  escitalopram 10 milliGRAM(s) Oral daily  glucagon  Injectable 1 milliGRAM(s) IntraMuscular once  haloperidol     Tablet 2.5 milliGRAM(s) Oral daily  haloperidol     Tablet 2.5 milliGRAM(s) Oral at bedtime  insulin lispro (ADMELOG) corrective regimen sliding scale   SubCutaneous three times a day before meals  insulin lispro (ADMELOG) corrective regimen sliding scale   SubCutaneous at bedtime  labetalol 200 milliGRAM(s) Oral two times a day  levothyroxine 50 MICROGram(s) Oral daily  sodium chloride 1 Gram(s) Oral three times a day  tamsulosin 0.4 milliGRAM(s) Oral at bedtime    MEDICATIONS  (PRN):  acetaminophen     Tablet .. 650 milliGRAM(s) Oral every 6 hours PRN Temp greater or equal to 38C (100.4F), Mild Pain (1 - 3)  aluminum hydroxide/magnesium hydroxide/simethicone Suspension 30 milliLiter(s) Oral every 6 hours PRN Dyspepsia  AQUAPHOR (petrolatum Ointment) 1 Application(s) Topical two times a day PRN dry skin  chlorproMAZINE    Injectable 50 milliGRAM(s) IntraMuscular once PRN agitation  dextrose Oral Gel 15 Gram(s) Oral once PRN Blood Glucose LESS THAN 70 milliGRAM(s)/deciliter  LORazepam     Tablet 2 milliGRAM(s) Oral every 6 hours PRN Anxiety  LORazepam   Injectable 2 milliGRAM(s) IntraMuscular once PRN Agitation  magnesium hydroxide Suspension 30 milliLiter(s) Oral daily PRN Constipation  melatonin. 3 milliGRAM(s) Oral at bedtime PRN Insomnia  traZODone 50 milliGRAM(s) Oral at bedtime PRN insomnia   MEDICATIONS  (STANDING):  amLODIPine   Tablet 10 milliGRAM(s) Oral daily  atorvastatin 40 milliGRAM(s) Oral at bedtime  dextrose 5%. 1000 milliLiter(s) (50 mL/Hr) IV Continuous <Continuous>  dextrose 5%. 1000 milliLiter(s) (100 mL/Hr) IV Continuous <Continuous>  dextrose 50% Injectable 12.5 Gram(s) IV Push once  dextrose 50% Injectable 25 Gram(s) IV Push once  dextrose 50% Injectable 25 Gram(s) IV Push once  escitalopram 10 milliGRAM(s) Oral daily  glucagon  Injectable 1 milliGRAM(s) IntraMuscular once  haloperidol     Tablet 5 milliGRAM(s) Oral at bedtime  haloperidol     Tablet 2.5 milliGRAM(s) Oral daily  insulin lispro (ADMELOG) corrective regimen sliding scale   SubCutaneous three times a day before meals  insulin lispro (ADMELOG) corrective regimen sliding scale   SubCutaneous at bedtime  labetalol 200 milliGRAM(s) Oral two times a day  levothyroxine 50 MICROGram(s) Oral daily  sodium chloride 1 Gram(s) Oral three times a day  tamsulosin 0.4 milliGRAM(s) Oral at bedtime    MEDICATIONS  (PRN):  acetaminophen     Tablet .. 650 milliGRAM(s) Oral every 6 hours PRN Temp greater or equal to 38C (100.4F), Mild Pain (1 - 3)  aluminum hydroxide/magnesium hydroxide/simethicone Suspension 30 milliLiter(s) Oral every 6 hours PRN Dyspepsia  AQUAPHOR (petrolatum Ointment) 1 Application(s) Topical two times a day PRN dry skin  chlorproMAZINE    Injectable 50 milliGRAM(s) IntraMuscular once PRN agitation  dextrose Oral Gel 15 Gram(s) Oral once PRN Blood Glucose LESS THAN 70 milliGRAM(s)/deciliter  LORazepam     Tablet 1 milliGRAM(s) Oral every 6 hours PRN Anxiety  LORazepam   Injectable 2 milliGRAM(s) IntraMuscular once PRN Agitation  magnesium hydroxide Suspension 30 milliLiter(s) Oral daily PRN Constipation  melatonin. 3 milliGRAM(s) Oral at bedtime PRN Insomnia  traZODone 50 milliGRAM(s) Oral at bedtime PRN insomnia

## 2024-08-06 LAB
ALBUMIN SERPL ELPH-MCNC: 4.3 G/DL — SIGNIFICANT CHANGE UP (ref 3.3–5)
ALP SERPL-CCNC: 110 U/L — SIGNIFICANT CHANGE UP (ref 40–120)
ALT FLD-CCNC: 21 U/L — SIGNIFICANT CHANGE UP (ref 4–41)
ANION GAP SERPL CALC-SCNC: 13 MMOL/L — SIGNIFICANT CHANGE UP (ref 7–14)
AST SERPL-CCNC: 19 U/L — SIGNIFICANT CHANGE UP (ref 4–40)
BASOPHILS # BLD AUTO: 0.03 K/UL — SIGNIFICANT CHANGE UP (ref 0–0.2)
BASOPHILS NFR BLD AUTO: 0.8 % — SIGNIFICANT CHANGE UP (ref 0–2)
BILIRUB SERPL-MCNC: 0.3 MG/DL — SIGNIFICANT CHANGE UP (ref 0.2–1.2)
BUN SERPL-MCNC: 19 MG/DL — SIGNIFICANT CHANGE UP (ref 7–23)
CALCIUM SERPL-MCNC: 9.8 MG/DL — SIGNIFICANT CHANGE UP (ref 8.4–10.5)
CHLORIDE SERPL-SCNC: 100 MMOL/L — SIGNIFICANT CHANGE UP (ref 98–107)
CO2 SERPL-SCNC: 24 MMOL/L — SIGNIFICANT CHANGE UP (ref 22–31)
CREAT SERPL-MCNC: 1.19 MG/DL — SIGNIFICANT CHANGE UP (ref 0.5–1.3)
EGFR: 72 ML/MIN/1.73M2 — SIGNIFICANT CHANGE UP
EOSINOPHIL # BLD AUTO: 0.17 K/UL — SIGNIFICANT CHANGE UP (ref 0–0.5)
EOSINOPHIL NFR BLD AUTO: 4.3 % — SIGNIFICANT CHANGE UP (ref 0–6)
GLUCOSE BLDC GLUCOMTR-MCNC: 109 MG/DL — HIGH (ref 70–99)
GLUCOSE BLDC GLUCOMTR-MCNC: 91 MG/DL — SIGNIFICANT CHANGE UP (ref 70–99)
GLUCOSE BLDC GLUCOMTR-MCNC: 91 MG/DL — SIGNIFICANT CHANGE UP (ref 70–99)
GLUCOSE BLDC GLUCOMTR-MCNC: 98 MG/DL — SIGNIFICANT CHANGE UP (ref 70–99)
GLUCOSE SERPL-MCNC: 110 MG/DL — HIGH (ref 70–99)
HCT VFR BLD CALC: 35.9 % — LOW (ref 39–50)
HGB BLD-MCNC: 12.4 G/DL — LOW (ref 13–17)
IANC: 2.53 K/UL — SIGNIFICANT CHANGE UP (ref 1.8–7.4)
IMM GRANULOCYTES NFR BLD AUTO: 0.3 % — SIGNIFICANT CHANGE UP (ref 0–0.9)
LYMPHOCYTES # BLD AUTO: 0.86 K/UL — LOW (ref 1–3.3)
LYMPHOCYTES # BLD AUTO: 21.8 % — SIGNIFICANT CHANGE UP (ref 13–44)
MCHC RBC-ENTMCNC: 28.4 PG — SIGNIFICANT CHANGE UP (ref 27–34)
MCHC RBC-ENTMCNC: 34.5 GM/DL — SIGNIFICANT CHANGE UP (ref 32–36)
MCV RBC AUTO: 82.2 FL — SIGNIFICANT CHANGE UP (ref 80–100)
MONOCYTES # BLD AUTO: 0.35 K/UL — SIGNIFICANT CHANGE UP (ref 0–0.9)
MONOCYTES NFR BLD AUTO: 8.9 % — SIGNIFICANT CHANGE UP (ref 2–14)
NEUTROPHILS # BLD AUTO: 2.53 K/UL — SIGNIFICANT CHANGE UP (ref 1.8–7.4)
NEUTROPHILS NFR BLD AUTO: 63.9 % — SIGNIFICANT CHANGE UP (ref 43–77)
NRBC # BLD: 0 /100 WBCS — SIGNIFICANT CHANGE UP (ref 0–0)
NRBC # FLD: 0 K/UL — SIGNIFICANT CHANGE UP (ref 0–0)
PLATELET # BLD AUTO: 144 K/UL — LOW (ref 150–400)
POTASSIUM SERPL-MCNC: 4.6 MMOL/L — SIGNIFICANT CHANGE UP (ref 3.5–5.3)
POTASSIUM SERPL-SCNC: 4.6 MMOL/L — SIGNIFICANT CHANGE UP (ref 3.5–5.3)
PROT SERPL-MCNC: 6.8 G/DL — SIGNIFICANT CHANGE UP (ref 6–8.3)
RBC # BLD: 4.37 M/UL — SIGNIFICANT CHANGE UP (ref 4.2–5.8)
RBC # FLD: 14 % — SIGNIFICANT CHANGE UP (ref 10.3–14.5)
SODIUM SERPL-SCNC: 137 MMOL/L — SIGNIFICANT CHANGE UP (ref 135–145)
WBC # BLD: 3.95 K/UL — SIGNIFICANT CHANGE UP (ref 3.8–10.5)
WBC # FLD AUTO: 3.95 K/UL — SIGNIFICANT CHANGE UP (ref 3.8–10.5)

## 2024-08-06 PROCEDURE — 90853 GROUP PSYCHOTHERAPY: CPT

## 2024-08-06 PROCEDURE — 99232 SBSQ HOSP IP/OBS MODERATE 35: CPT

## 2024-08-06 RX ADMIN — SODIUM CHLORIDE 1 GRAM(S): 9 INJECTION INTRAMUSCULAR; INTRAVENOUS; SUBCUTANEOUS at 13:30

## 2024-08-06 RX ADMIN — Medication 200 MILLIGRAM(S): at 20:18

## 2024-08-06 RX ADMIN — SODIUM CHLORIDE 1 GRAM(S): 9 INJECTION INTRAMUSCULAR; INTRAVENOUS; SUBCUTANEOUS at 09:02

## 2024-08-06 RX ADMIN — Medication 5 MILLIGRAM(S): at 20:19

## 2024-08-06 RX ADMIN — Medication 200 MILLIGRAM(S): at 09:01

## 2024-08-06 RX ADMIN — Medication 3 MILLIGRAM(S): at 20:18

## 2024-08-06 RX ADMIN — AMLODIPINE BESYLATE 10 MILLIGRAM(S): 10 TABLET ORAL at 09:01

## 2024-08-06 RX ADMIN — TAMSULOSIN HYDROCHLORIDE 0.4 MILLIGRAM(S): 0.4 CAPSULE ORAL at 20:18

## 2024-08-06 RX ADMIN — Medication 0: at 20:45

## 2024-08-06 RX ADMIN — SODIUM CHLORIDE 1 GRAM(S): 9 INJECTION INTRAMUSCULAR; INTRAVENOUS; SUBCUTANEOUS at 20:18

## 2024-08-06 RX ADMIN — LORAZEPAM 1 MILLIGRAM(S): 4 INJECTION INTRAMUSCULAR; INTRAVENOUS at 11:18

## 2024-08-06 RX ADMIN — Medication 2.5 MILLIGRAM(S): at 09:01

## 2024-08-06 RX ADMIN — Medication 50 MICROGRAM(S): at 05:13

## 2024-08-06 RX ADMIN — ESCITALOPRAM OXALATE 10 MILLIGRAM(S): 10 TABLET ORAL at 09:01

## 2024-08-06 RX ADMIN — Medication 40 MILLIGRAM(S): at 20:20

## 2024-08-06 NOTE — BH INPATIENT PSYCHIATRY PROGRESS NOTE - NSBHMETABOLIC_PSY_ALL_CORE_FT
BMI: BMI (kg/m2): 33.4 (07-23-24 @ 10:08)  HbA1c: A1C with Estimated Average Glucose Result: 5.8 % (07-24-24 @ 07:48)    Glucose: POCT Blood Glucose.: 132 mg/dL (08-05-24 @ 16:35)    BP: 143/75 (08-02-24 @ 19:21) (143/75 - 143/75)Vital Signs Last 24 Hrs  T(C): 36.3 (08-05-24 @ 08:48), Max: 36.5 (08-04-24 @ 19:32)  T(F): 97.4 (08-05-24 @ 08:48), Max: 97.7 (08-04-24 @ 19:32)  HR: --  BP: --  BP(mean): --  RR: --  SpO2: --    Orthostatic VS  08-05-24 @ 08:48  Lying BP: --/-- HR: --  Sitting BP: 121/69 HR: 62  Standing BP: 118/66 HR: 79  Site: --  Mode: --  Orthostatic VS  08-04-24 @ 19:32  Lying BP: --/-- HR: --  Sitting BP: 142/77 HR: 69  Standing BP: 127/67 HR: 68  Site: upper left arm  Mode: electronic  Orthostatic VS  08-04-24 @ 08:15  Lying BP: --/-- HR: --  Sitting BP: 120/70 HR: 62  Standing BP: 108/60 HR: 72  Site: --  Mode: --  Orthostatic VS  08-03-24 @ 19:04  Lying BP: --/-- HR: --  Sitting BP: 145/70 HR: 63  Standing BP: --/-- HR: --  Site: --  Mode: --    Lipid Panel: Date/Time: 07-24-24 @ 07:48  Cholesterol, Serum: 81  LDL Cholesterol Calculated: 34  HDL Cholesterol, Serum: 30  Total Cholesterol/HDL Ration Measurement: --  Triglycerides, Serum: 84

## 2024-08-06 NOTE — BH INPATIENT PSYCHIATRY PROGRESS NOTE - NSBHASSESSSUMMFT_PSY_ALL_CORE
57 yo M with PPhx significant for schizoaffective disorder, history of multiple inpatient psychiatric hospitalizations (last hospitalized at [facility] from 6/22-7/12/24), follows outpatient at [facility] with Dr. Cook, and PMH significant for HTN, HLD, hypothyroidism, CVID/ hypogammaglobulinemia (on monthly IVIG - Last dose Jun 16th, 2024), COPD not on home O2, hx of frequent skin infection who presented as initially to [facility] for  overdose of labetalol, but was also found to have left leg abscess vs cellulitis and was transferred on 7/22/24 for IVIG infusions (received 7/24/24).  Psychiatry evaluated Patient: "... somewhat childlike, but overall pleasant and engaged. He recalls that he on the day of discharge from [facility], he started having paranoid thoughts about people wanting to kill him and that others were out to get him. States that the paranoia was so distressing that he immediately thought of ending his life and overdosed on numerous labetalol pills. He started to feel nauseated and realized that he does want to live and immediately called the ambulance where he was taken to [facility]. Patient expresses that the negative thoughts, paranoia, suicidal thoughts were difficult for him when he returned home from his psychiatric hospitalization because he has a hard time talking to others about it. He reports that he follows with Dr. Cook at [facility] on an outpatient basis, and has been doing well with individualized treatment with her." Per chart review, patient was switched from abilify to haldol during his last hospitalization. At discharge he was taking haldol 10 mg PO BID. He received haldol decanoate 100 mg IM on 7/5 and the next loading dose of 100 mg IM on 7/10. He will be due for maintenance dose of haldol decanoate 200 mg IM on 8/8/24. Patient reports that he recalls that he received 2 of the haldol shots and the plan was to continue treatment on outpatient basis.   AT [facility]:  Left lateral leg w/ pain and purulence. Immunocompromised given hx of CVID. Currently w/o systemic signs of infection. MRSA sputum and nares + in OSH. s/p Vanc --> transitioned to Bactrim (Started on 7/13), s/p course of vanco at [facility], c/w doxycycline through 7/28; need for MRI. pt also very claustrophobic. MRI order d/c. 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 Mitchell Boxer. s/p IVIG x1 at [facility] 07/24/24. TASH -  Elevated creatine 1.8, BUN 26. Creatine in June 2024. 0.7, drug induced at [facility] vs ATN from shock;  U/A relatively bland with mild spot proteinuria; renal/bladder U/S: no hydronephrosis. hold home ARB      - last day of oral antibiotics on Sunday   - s/p IV IG infusion on 7/24/24 (monthly)   -  maintain haldol 2.5mg daily, increase haldol to 5mg hs,  Received haldol decanoate 100 mg IM on 7/5 and the next loading dose of 100 mg IM on 7/10. He will be due for maintenance dose of haldol decanoate 200 mg IM on 8/8/24.  - [facility] psych recommended starting Lexapro 5,mg po on 7/26/24, increase to 10mg for 8/1

## 2024-08-06 NOTE — BH INPATIENT PSYCHIATRY PROGRESS NOTE - CURRENT MEDICATION
MEDICATIONS  (STANDING):  amLODIPine   Tablet 10 milliGRAM(s) Oral daily  atorvastatin 40 milliGRAM(s) Oral at bedtime  dextrose 5%. 1000 milliLiter(s) (50 mL/Hr) IV Continuous <Continuous>  dextrose 5%. 1000 milliLiter(s) (100 mL/Hr) IV Continuous <Continuous>  dextrose 50% Injectable 25 Gram(s) IV Push once  dextrose 50% Injectable 12.5 Gram(s) IV Push once  dextrose 50% Injectable 25 Gram(s) IV Push once  escitalopram 10 milliGRAM(s) Oral daily  glucagon  Injectable 1 milliGRAM(s) IntraMuscular once  haloperidol     Tablet 5 milliGRAM(s) Oral at bedtime  haloperidol     Tablet 2.5 milliGRAM(s) Oral daily  insulin lispro (ADMELOG) corrective regimen sliding scale   SubCutaneous three times a day before meals  insulin lispro (ADMELOG) corrective regimen sliding scale   SubCutaneous at bedtime  labetalol 200 milliGRAM(s) Oral two times a day  levothyroxine 50 MICROGram(s) Oral daily  sodium chloride 1 Gram(s) Oral three times a day  tamsulosin 0.4 milliGRAM(s) Oral at bedtime    MEDICATIONS  (PRN):  acetaminophen     Tablet .. 650 milliGRAM(s) Oral every 6 hours PRN Temp greater or equal to 38C (100.4F), Mild Pain (1 - 3)  aluminum hydroxide/magnesium hydroxide/simethicone Suspension 30 milliLiter(s) Oral every 6 hours PRN Dyspepsia  AQUAPHOR (petrolatum Ointment) 1 Application(s) Topical two times a day PRN dry skin  chlorproMAZINE    Injectable 50 milliGRAM(s) IntraMuscular once PRN agitation  dextrose Oral Gel 15 Gram(s) Oral once PRN Blood Glucose LESS THAN 70 milliGRAM(s)/deciliter  haloperidol     Tablet 2.5 milliGRAM(s) Oral every 6 hours PRN psychosis  LORazepam     Tablet 1 milliGRAM(s) Oral every 6 hours PRN Anxiety  LORazepam   Injectable 2 milliGRAM(s) IntraMuscular once PRN Agitation  magnesium hydroxide Suspension 30 milliLiter(s) Oral daily PRN Constipation  melatonin. 3 milliGRAM(s) Oral at bedtime PRN Insomnia  traZODone 50 milliGRAM(s) Oral at bedtime PRN insomnia

## 2024-08-06 NOTE — BH TREATMENT PLAN - NSTXDCSOCINTERSW_PSY_ALL_CORE
Ongoing support, psychoed and encouragement in effort to comply with meds, tx and discharge plans.
Ongoing support, psychoed and encouragement in effort to comply with meds, tx and discharge plans.
SW will refer pt to [facility] for step-down care following this admission due to recent suicide attempt.
SW will refer pt to [facility] for step-down care following this admission due to recent suicide attempt.

## 2024-08-06 NOTE — BH TREATMENT PLAN - NSCMSPTSTRENGTHS_PSY_ALL_CORE
Future/goal oriented/Supportive family
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
## 2024-08-06 NOTE — BH TREATMENT PLAN - NSPTSTATEDGOAL_PSY_ALL_CORE
As per EMR, patient wants to feel better.
 patient wants to feel better.
As per EMR, patient wants to feel better.
 patient wants to feel better.

## 2024-08-06 NOTE — BH TREATMENT PLAN - NSTXOTHERINTERPR_PSY_ALL_CORE
Psychiatric rehabilitation recommends patient attends 2-3 groups per day, engages in individual sessions and participates in activities on the milieu in order to promote socialization and exploration of coping strategies.
Psychiatric rehabilitation recommends patient attends 2-3 groups per day, engages in individual sessions and participates in activities on the milieu in order to promote socialization and exploration of coping strategies.
During time of engagement, patient was in room and  presented with euthymic mood. Patient was dressed in hospital gown and fairly groomed. Upon review, patient has made minimal progress towards specificied goal as evidenced by pts low psych rehab group attendance. . Psych rehab staff will continue to support patient via 1:1 engagement and encourage programming attendance in effort to faciliate continued progress towards goal.    Patient has attended minimal psychiatric rehabilitation groups during the last seven days despite dailyh prompting and encouragement from staff.
During time of engagement, patient was in room and  presented with euthymic mood. Patient was dressed in hospital gown and fairly groomed. Upon review, patient has made minimal progress towards specificied goal as evidenced by pts low psych rehab group attendance. . Psych rehab staff will continue to support patient via 1:1 engagement and encourage programming attendance in effort to faciliate continued progress towards goal.    Patient has attended minimal psychiatric rehabilitation groups during the last seven days despite dailyh prompting and encouragement from staff.

## 2024-08-06 NOTE — BH INPATIENT PSYCHIATRY PROGRESS NOTE - PRN MEDS
MEDICATIONS  (PRN):  acetaminophen     Tablet .. 650 milliGRAM(s) Oral every 6 hours PRN Temp greater or equal to 38C (100.4F), Mild Pain (1 - 3)  aluminum hydroxide/magnesium hydroxide/simethicone Suspension 30 milliLiter(s) Oral every 6 hours PRN Dyspepsia  AQUAPHOR (petrolatum Ointment) 1 Application(s) Topical two times a day PRN dry skin  chlorproMAZINE    Injectable 50 milliGRAM(s) IntraMuscular once PRN agitation  dextrose Oral Gel 15 Gram(s) Oral once PRN Blood Glucose LESS THAN 70 milliGRAM(s)/deciliter  haloperidol     Tablet 2.5 milliGRAM(s) Oral every 6 hours PRN psychosis  LORazepam     Tablet 1 milliGRAM(s) Oral every 6 hours PRN Anxiety  LORazepam   Injectable 2 milliGRAM(s) IntraMuscular once PRN Agitation  magnesium hydroxide Suspension 30 milliLiter(s) Oral daily PRN Constipation  melatonin. 3 milliGRAM(s) Oral at bedtime PRN Insomnia  traZODone 50 milliGRAM(s) Oral at bedtime PRN insomnia

## 2024-08-07 LAB
GLUCOSE BLDC GLUCOMTR-MCNC: 101 MG/DL — HIGH (ref 70–99)
GLUCOSE BLDC GLUCOMTR-MCNC: 111 MG/DL — HIGH (ref 70–99)
GLUCOSE BLDC GLUCOMTR-MCNC: 87 MG/DL — SIGNIFICANT CHANGE UP (ref 70–99)
GLUCOSE BLDC GLUCOMTR-MCNC: 96 MG/DL — SIGNIFICANT CHANGE UP (ref 70–99)

## 2024-08-07 PROCEDURE — 90853 GROUP PSYCHOTHERAPY: CPT

## 2024-08-07 PROCEDURE — 99232 SBSQ HOSP IP/OBS MODERATE 35: CPT

## 2024-08-07 RX ORDER — HALOPERIDOL 1 MG
200 TABLET ORAL ONCE
Refills: 0 | Status: COMPLETED | OUTPATIENT
Start: 2024-08-07 | End: 2024-08-07

## 2024-08-07 RX ADMIN — AMLODIPINE BESYLATE 10 MILLIGRAM(S): 10 TABLET ORAL at 08:25

## 2024-08-07 RX ADMIN — SODIUM CHLORIDE 1 GRAM(S): 9 INJECTION INTRAMUSCULAR; INTRAVENOUS; SUBCUTANEOUS at 12:24

## 2024-08-07 RX ADMIN — Medication 200 MILLIGRAM(S): at 16:11

## 2024-08-07 RX ADMIN — Medication 200 MILLIGRAM(S): at 20:28

## 2024-08-07 RX ADMIN — LORAZEPAM 1 MILLIGRAM(S): 4 INJECTION INTRAMUSCULAR; INTRAVENOUS at 13:33

## 2024-08-07 RX ADMIN — TAMSULOSIN HYDROCHLORIDE 0.4 MILLIGRAM(S): 0.4 CAPSULE ORAL at 20:29

## 2024-08-07 RX ADMIN — Medication 40 MILLIGRAM(S): at 20:28

## 2024-08-07 RX ADMIN — Medication 5 MILLIGRAM(S): at 20:28

## 2024-08-07 RX ADMIN — Medication 2.5 MILLIGRAM(S): at 08:24

## 2024-08-07 RX ADMIN — Medication 200 MILLIGRAM(S): at 08:25

## 2024-08-07 RX ADMIN — ESCITALOPRAM OXALATE 10 MILLIGRAM(S): 10 TABLET ORAL at 08:24

## 2024-08-07 RX ADMIN — SODIUM CHLORIDE 1 GRAM(S): 9 INJECTION INTRAMUSCULAR; INTRAVENOUS; SUBCUTANEOUS at 08:25

## 2024-08-07 RX ADMIN — SODIUM CHLORIDE 1 GRAM(S): 9 INJECTION INTRAMUSCULAR; INTRAVENOUS; SUBCUTANEOUS at 20:29

## 2024-08-07 RX ADMIN — Medication 3 MILLIGRAM(S): at 20:29

## 2024-08-07 NOTE — BH INPATIENT PSYCHIATRY PROGRESS NOTE - CURRENT MEDICATION
MEDICATIONS  (STANDING):  amLODIPine   Tablet 10 milliGRAM(s) Oral daily  atorvastatin 40 milliGRAM(s) Oral at bedtime  dextrose 5%. 1000 milliLiter(s) (50 mL/Hr) IV Continuous <Continuous>  dextrose 5%. 1000 milliLiter(s) (100 mL/Hr) IV Continuous <Continuous>  dextrose 50% Injectable 25 Gram(s) IV Push once  dextrose 50% Injectable 12.5 Gram(s) IV Push once  dextrose 50% Injectable 25 Gram(s) IV Push once  escitalopram 10 milliGRAM(s) Oral daily  glucagon  Injectable 1 milliGRAM(s) IntraMuscular once  haloperidol     Tablet 2.5 milliGRAM(s) Oral daily  haloperidol     Tablet 5 milliGRAM(s) Oral at bedtime  insulin lispro (ADMELOG) corrective regimen sliding scale   SubCutaneous three times a day before meals  insulin lispro (ADMELOG) corrective regimen sliding scale   SubCutaneous at bedtime  labetalol 200 milliGRAM(s) Oral two times a day  levothyroxine 50 MICROGram(s) Oral daily  sodium chloride 1 Gram(s) Oral three times a day  tamsulosin 0.4 milliGRAM(s) Oral at bedtime    MEDICATIONS  (PRN):  acetaminophen     Tablet .. 650 milliGRAM(s) Oral every 6 hours PRN Temp greater or equal to 38C (100.4F), Mild Pain (1 - 3)  aluminum hydroxide/magnesium hydroxide/simethicone Suspension 30 milliLiter(s) Oral every 6 hours PRN Dyspepsia  AQUAPHOR (petrolatum Ointment) 1 Application(s) Topical two times a day PRN dry skin  chlorproMAZINE    Injectable 50 milliGRAM(s) IntraMuscular once PRN agitation  dextrose Oral Gel 15 Gram(s) Oral once PRN Blood Glucose LESS THAN 70 milliGRAM(s)/deciliter  haloperidol     Tablet 2.5 milliGRAM(s) Oral every 6 hours PRN psychosis  LORazepam     Tablet 1 milliGRAM(s) Oral every 6 hours PRN Anxiety  LORazepam   Injectable 2 milliGRAM(s) IntraMuscular once PRN Agitation  magnesium hydroxide Suspension 30 milliLiter(s) Oral daily PRN Constipation  melatonin. 3 milliGRAM(s) Oral at bedtime PRN Insomnia  traZODone 50 milliGRAM(s) Oral at bedtime PRN insomnia
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
## 2024-08-07 NOTE — BH INPATIENT PSYCHIATRY PROGRESS NOTE - NSBHASSESSSUMMFT_PSY_ALL_CORE
59 yo M with PPhx significant for schizoaffective disorder, history of multiple inpatient psychiatric hospitalizations (last hospitalized at [facility] from 6/22-7/12/24), follows outpatient at [facility] with Dr. Cook, and PMH significant for HTN, HLD, hypothyroidism, CVID/ hypogammaglobulinemia (on monthly IVIG - Last dose Jun 16th, 2024), COPD not on home O2, hx of frequent skin infection who presented as initially to [facility] for  overdose of labetalol, but was also found to have left leg abscess vs cellulitis and was transferred on 7/22/24 for IVIG infusions (received 7/24/24).  Psychiatry evaluated Patient: "... somewhat childlike, but overall pleasant and engaged. He recalls that he on the day of discharge from [facility], he started having paranoid thoughts about people wanting to kill him and that others were out to get him. States that the paranoia was so distressing that he immediately thought of ending his life and overdosed on numerous labetalol pills. He started to feel nauseated and realized that he does want to live and immediately called the ambulance where he was taken to [facility]. Patient expresses that the negative thoughts, paranoia, suicidal thoughts were difficult for him when he returned home from his psychiatric hospitalization because he has a hard time talking to others about it. He reports that he follows with Dr. Cook at [facility] on an outpatient basis, and has been doing well with individualized treatment with her." Per chart review, patient was switched from abilify to haldol during his last hospitalization. At discharge he was taking haldol 10 mg PO BID. He received haldol decanoate 100 mg IM on 7/5 and the next loading dose of 100 mg IM on 7/10. He will be due for maintenance dose of haldol decanoate 200 mg IM on 8/8/24. Patient reports that he recalls that he received 2 of the haldol shots and the plan was to continue treatment on outpatient basis.   AT [facility]:  Left lateral leg w/ pain and purulence. Immunocompromised given hx of CVID. Currently w/o systemic signs of infection. MRSA sputum and nares + in OSH. s/p Vanc --> transitioned to Bactrim (Started on 7/13), s/p course of vanco at [facility], c/w doxycycline through 7/28; need for MRI. pt also very claustrophobic. MRI order d/c. 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 Mitchell Boxer. s/p IVIG x1 at [facility] 07/24/24. TASH -  Elevated creatine 1.8, BUN 26. Creatine in June 2024. 0.7, drug induced at [facility] vs ATN from shock;  U/A relatively bland with mild spot proteinuria; renal/bladder U/S: no hydronephrosis. hold home ARB      - last day of oral antibiotics on Sunday   - s/p IV IG infusion on 7/24/24, gets this every 3.5 weeks.   -  maintain haldol 2.5mg daily, increase haldol to 5mg hs,  Received haldol decanoate 100 mg IM on 7/5 and the next loading dose of 100 mg IM on 7/10. He will be due for maintenance dose of haldol decanoate 200 mg IM --received on 8/7/24  - [facility] psych recommended starting Lexapro 5,mg po on 7/26/24, increase to 10mg for 8/1

## 2024-08-08 ENCOUNTER — EMERGENCY (EMERGENCY)
Facility: HOSPITAL | Age: 59
LOS: 1 days | Discharge: HOME | End: 2024-08-08
Admitting: STUDENT IN AN ORGANIZED HEALTH CARE EDUCATION/TRAINING PROGRAM
Payer: MEDICARE

## 2024-08-08 VITALS
TEMPERATURE: 98 F | WEIGHT: 248.02 LBS | DIASTOLIC BLOOD PRESSURE: 79 MMHG | SYSTOLIC BLOOD PRESSURE: 129 MMHG | OXYGEN SATURATION: 95 % | HEART RATE: 62 BPM | HEIGHT: 74 IN | RESPIRATION RATE: 17 BRPM

## 2024-08-08 VITALS
HEART RATE: 59 BPM | RESPIRATION RATE: 18 BRPM | SYSTOLIC BLOOD PRESSURE: 132 MMHG | DIASTOLIC BLOOD PRESSURE: 75 MMHG | TEMPERATURE: 98 F | OXYGEN SATURATION: 98 %

## 2024-08-08 DIAGNOSIS — J34.2 DEVIATED NASAL SEPTUM: Chronic | ICD-10-CM

## 2024-08-08 DIAGNOSIS — K08.199 COMPLETE LOSS OF TEETH DUE TO OTHER SPECIFIED CAUSE, UNSPECIFIED CLASS: Chronic | ICD-10-CM

## 2024-08-08 LAB
ALBUMIN SERPL ELPH-MCNC: 4.3 G/DL — SIGNIFICANT CHANGE UP (ref 3.3–5)
ALP SERPL-CCNC: 104 U/L — SIGNIFICANT CHANGE UP (ref 40–120)
ALT FLD-CCNC: 19 U/L — SIGNIFICANT CHANGE UP (ref 4–41)
ANION GAP SERPL CALC-SCNC: 11 MMOL/L — SIGNIFICANT CHANGE UP (ref 7–14)
AST SERPL-CCNC: 18 U/L — SIGNIFICANT CHANGE UP (ref 4–40)
BASE EXCESS BLDV CALC-SCNC: 2 MMOL/L — SIGNIFICANT CHANGE UP (ref -2–3)
BASOPHILS # BLD AUTO: 0.02 K/UL — SIGNIFICANT CHANGE UP (ref 0–0.2)
BASOPHILS NFR BLD AUTO: 0.4 % — SIGNIFICANT CHANGE UP (ref 0–2)
BILIRUB SERPL-MCNC: 0.3 MG/DL — SIGNIFICANT CHANGE UP (ref 0.2–1.2)
BLOOD GAS VENOUS COMPREHENSIVE RESULT: SIGNIFICANT CHANGE UP
BUN SERPL-MCNC: 18 MG/DL — SIGNIFICANT CHANGE UP (ref 7–23)
CALCIUM SERPL-MCNC: 9.4 MG/DL — SIGNIFICANT CHANGE UP (ref 8.4–10.5)
CHLORIDE BLDV-SCNC: 102 MMOL/L — SIGNIFICANT CHANGE UP (ref 96–108)
CHLORIDE SERPL-SCNC: 102 MMOL/L — SIGNIFICANT CHANGE UP (ref 98–107)
CO2 BLDV-SCNC: 29.7 MMOL/L — HIGH (ref 22–26)
CO2 SERPL-SCNC: 24 MMOL/L — SIGNIFICANT CHANGE UP (ref 22–31)
CREAT SERPL-MCNC: 1.19 MG/DL — SIGNIFICANT CHANGE UP (ref 0.5–1.3)
EGFR: 72 ML/MIN/1.73M2 — SIGNIFICANT CHANGE UP
EOSINOPHIL # BLD AUTO: 0.21 K/UL — SIGNIFICANT CHANGE UP (ref 0–0.5)
EOSINOPHIL NFR BLD AUTO: 4.1 % — SIGNIFICANT CHANGE UP (ref 0–6)
GAS PNL BLDV: 134 MMOL/L — LOW (ref 136–145)
GAS PNL BLDV: SIGNIFICANT CHANGE UP
GLUCOSE BLDC GLUCOMTR-MCNC: 104 MG/DL — HIGH (ref 70–99)
GLUCOSE BLDC GLUCOMTR-MCNC: 104 MG/DL — HIGH (ref 70–99)
GLUCOSE BLDC GLUCOMTR-MCNC: 78 MG/DL — SIGNIFICANT CHANGE UP (ref 70–99)
GLUCOSE BLDC GLUCOMTR-MCNC: 98 MG/DL — SIGNIFICANT CHANGE UP (ref 70–99)
GLUCOSE BLDV-MCNC: 117 MG/DL — HIGH (ref 70–99)
GLUCOSE SERPL-MCNC: 116 MG/DL — HIGH (ref 70–99)
HCO3 BLDV-SCNC: 28 MMOL/L — SIGNIFICANT CHANGE UP (ref 22–29)
HCT VFR BLD CALC: 34.7 % — LOW (ref 39–50)
HCT VFR BLDA CALC: 36 % — LOW (ref 39–51)
HGB BLD CALC-MCNC: 12 G/DL — LOW (ref 12.6–17.4)
HGB BLD-MCNC: 11.7 G/DL — LOW (ref 13–17)
IANC: 3.58 K/UL — SIGNIFICANT CHANGE UP (ref 1.8–7.4)
IMM GRANULOCYTES NFR BLD AUTO: 0.4 % — SIGNIFICANT CHANGE UP (ref 0–0.9)
LACTATE BLDV-MCNC: 0.9 MMOL/L — SIGNIFICANT CHANGE UP (ref 0.5–2)
LYMPHOCYTES # BLD AUTO: 0.96 K/UL — LOW (ref 1–3.3)
LYMPHOCYTES # BLD AUTO: 18.8 % — SIGNIFICANT CHANGE UP (ref 13–44)
MCHC RBC-ENTMCNC: 28.3 PG — SIGNIFICANT CHANGE UP (ref 27–34)
MCHC RBC-ENTMCNC: 33.7 GM/DL — SIGNIFICANT CHANGE UP (ref 32–36)
MCV RBC AUTO: 83.8 FL — SIGNIFICANT CHANGE UP (ref 80–100)
MONOCYTES # BLD AUTO: 0.33 K/UL — SIGNIFICANT CHANGE UP (ref 0–0.9)
MONOCYTES NFR BLD AUTO: 6.4 % — SIGNIFICANT CHANGE UP (ref 2–14)
NEUTROPHILS # BLD AUTO: 3.58 K/UL — SIGNIFICANT CHANGE UP (ref 1.8–7.4)
NEUTROPHILS NFR BLD AUTO: 69.9 % — SIGNIFICANT CHANGE UP (ref 43–77)
NRBC # BLD: 0 /100 WBCS — SIGNIFICANT CHANGE UP (ref 0–0)
NRBC # FLD: 0 K/UL — SIGNIFICANT CHANGE UP (ref 0–0)
PCO2 BLDV: 50 MMHG — SIGNIFICANT CHANGE UP (ref 42–55)
PH BLDV: 7.36 — SIGNIFICANT CHANGE UP (ref 7.32–7.43)
PLATELET # BLD AUTO: 120 K/UL — LOW (ref 150–400)
PO2 BLDV: 59 MMHG — HIGH (ref 25–45)
POTASSIUM BLDV-SCNC: 4.5 MMOL/L — SIGNIFICANT CHANGE UP (ref 3.5–5.1)
POTASSIUM SERPL-MCNC: 4.4 MMOL/L — SIGNIFICANT CHANGE UP (ref 3.5–5.3)
POTASSIUM SERPL-SCNC: 4.4 MMOL/L — SIGNIFICANT CHANGE UP (ref 3.5–5.3)
PROT SERPL-MCNC: 6.7 G/DL — SIGNIFICANT CHANGE UP (ref 6–8.3)
RBC # BLD: 4.14 M/UL — LOW (ref 4.2–5.8)
RBC # FLD: 14.2 % — SIGNIFICANT CHANGE UP (ref 10.3–14.5)
SAO2 % BLDV: 89.8 % — HIGH (ref 67–88)
SODIUM SERPL-SCNC: 137 MMOL/L — SIGNIFICANT CHANGE UP (ref 135–145)
WBC # BLD: 5.12 K/UL — SIGNIFICANT CHANGE UP (ref 3.8–10.5)
WBC # FLD AUTO: 5.12 K/UL — SIGNIFICANT CHANGE UP (ref 3.8–10.5)

## 2024-08-08 PROCEDURE — 99232 SBSQ HOSP IP/OBS MODERATE 35: CPT

## 2024-08-08 PROCEDURE — 70498 CT ANGIOGRAPHY NECK: CPT | Mod: 26,MC

## 2024-08-08 PROCEDURE — 99285 EMERGENCY DEPT VISIT HI MDM: CPT

## 2024-08-08 PROCEDURE — 72125 CT NECK SPINE W/O DYE: CPT | Mod: 26,MC

## 2024-08-08 RX ORDER — TOBRAMYCIN 0.3 %
2 DROPS OPHTHALMIC (EYE)
Refills: 0 | Status: COMPLETED | OUTPATIENT
Start: 2024-08-08 | End: 2024-08-13

## 2024-08-08 RX ORDER — KETOROLAC TROMETHAMINE 10 MG
30 TABLET ORAL ONCE
Refills: 0 | Status: DISCONTINUED | OUTPATIENT
Start: 2024-08-08 | End: 2024-08-08

## 2024-08-08 RX ORDER — BENZTROPINE MESYLATE 2 MG/1
2 TABLET ORAL ONCE
Refills: 0 | Status: COMPLETED | OUTPATIENT
Start: 2024-08-08 | End: 2024-08-08

## 2024-08-08 RX ORDER — BENZTROPINE MESYLATE 2 MG/1
1 TABLET ORAL
Refills: 0 | Status: DISCONTINUED | OUTPATIENT
Start: 2024-08-08 | End: 2024-08-16

## 2024-08-08 RX ORDER — BACTERIOSTATIC SODIUM CHLORIDE 0.9 %
1000 VIAL (ML) INJECTION ONCE
Refills: 0 | Status: COMPLETED | OUTPATIENT
Start: 2024-08-08 | End: 2024-08-08

## 2024-08-08 RX ORDER — LORAZEPAM 4 MG/ML
2 INJECTION INTRAMUSCULAR; INTRAVENOUS ONCE
Refills: 0 | Status: DISCONTINUED | OUTPATIENT
Start: 2024-08-08 | End: 2024-08-13

## 2024-08-08 RX ADMIN — Medication 2.5 MILLIGRAM(S): at 08:34

## 2024-08-08 RX ADMIN — Medication 30 MILLIGRAM(S): at 21:23

## 2024-08-08 RX ADMIN — Medication 200 MILLIGRAM(S): at 08:32

## 2024-08-08 RX ADMIN — ESCITALOPRAM OXALATE 10 MILLIGRAM(S): 10 TABLET ORAL at 08:33

## 2024-08-08 RX ADMIN — Medication 2 DROP(S): at 13:37

## 2024-08-08 RX ADMIN — BENZTROPINE MESYLATE 2 MILLIGRAM(S): 2 TABLET ORAL at 14:20

## 2024-08-08 RX ADMIN — Medication 1000 MILLILITER(S): at 21:23

## 2024-08-08 RX ADMIN — AMLODIPINE BESYLATE 10 MILLIGRAM(S): 10 TABLET ORAL at 08:33

## 2024-08-08 RX ADMIN — SODIUM CHLORIDE 1 GRAM(S): 9 INJECTION INTRAMUSCULAR; INTRAVENOUS; SUBCUTANEOUS at 08:34

## 2024-08-08 RX ADMIN — Medication 30 MILLIGRAM(S): at 18:03

## 2024-08-08 RX ADMIN — Medication 50 MICROGRAM(S): at 06:21

## 2024-08-08 NOTE — ED PROVIDER NOTE - PROGRESS NOTE DETAILS
GERMAN Holly: Labs grossly unremarkable grossly, CT imaging studies without any acute finsings, Results discussed with sisters (pt provided verbal permission). Case discussed with [facility] medicine team, made aware that pt is being sent back.   of note, there was a mention of left sided bacterial conjunctivitis for which pt is currently being treated for, medicine team to follow up.

## 2024-08-08 NOTE — ED ADULT TRIAGE NOTE - CHIEF COMPLAINT QUOTE
arrives from [facility] Low 3 c/o worsening chronic neck pain. received IM cogentin per ems 1x hour prior to arrival now states pain is a 3/10. also noted to have L eye redness with "crusting" denies chest pain, SOB, nausea, vomiting  pt calm and cooperative with answering triage questions   medical history DM2, Hypothyroidism, bipolar, COPD no oxygen use

## 2024-08-08 NOTE — ED ADULT NURSE NOTE - CHIEF COMPLAINT QUOTE
arrives from [facility] Low 3 c/o worsening chronic neck pain. received IM cogentin per ems 1x hour prior to arrival now states pain is a 3/10. also noted to have L eye redness with "crusting" denies chest pain, SOB, nausea, vomiting  pt calm and cooperative with answering triage questions   medical history DM2, Hypothyroidism, bipolar, COPD no oxygen use

## 2024-08-08 NOTE — BH INPATIENT PSYCHIATRY PROGRESS NOTE - NSBHASSESSSUMMFT_PSY_ALL_CORE
57 yo M with PPhx significant for schizoaffective disorder, history of multiple inpatient psychiatric hospitalizations (last hospitalized at [facility] from 6/22-7/12/24), follows outpatient at [facility] with Dr. Cook, and PMH significant for HTN, HLD, hypothyroidism, CVID/ hypogammaglobulinemia (on monthly IVIG - Last dose Jun 16th, 2024), COPD not on home O2, hx of frequent skin infection who presented as initially to [facility] for  overdose of labetalol, but was also found to have left leg abscess vs cellulitis and was transferred on 7/22/24 for IVIG infusions (received 7/24/24).  Psychiatry evaluated Patient: "... somewhat childlike, but overall pleasant and engaged. He recalls that he on the day of discharge from [facility], he started having paranoid thoughts about people wanting to kill him and that others were out to get him. States that the paranoia was so distressing that he immediately thought of ending his life and overdosed on numerous labetalol pills. He started to feel nauseated and realized that he does want to live and immediately called the ambulance where he was taken to [facility]. Patient expresses that the negative thoughts, paranoia, suicidal thoughts were difficult for him when he returned home from his psychiatric hospitalization because he has a hard time talking to others about it. He reports that he follows with Dr. Cook at [facility] on an outpatient basis, and has been doing well with individualized treatment with her." Per chart review, patient was switched from abilify to haldol during his last hospitalization. At discharge he was taking haldol 10 mg PO BID. He received haldol decanoate 100 mg IM on 7/5 and the next loading dose of 100 mg IM on 7/10. He will be due for maintenance dose of haldol decanoate 200 mg IM on 8/8/24. Patient reports that he recalls that he received 2 of the haldol shots and the plan was to continue treatment on outpatient basis.   AT [facility]:  Left lateral leg w/ pain and purulence. Immunocompromised given hx of CVID. Currently w/o systemic signs of infection. MRSA sputum and nares + in OSH. s/p Vanc --> transitioned to Bactrim (Started on 7/13), s/p course of vanco at [facility], c/w doxycycline through 7/28; need for MRI. pt also very claustrophobic. MRI order d/c. 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 Mitchell Boxer. s/p IVIG x1 at [facility] 07/24/24. TASH -  Elevated creatine 1.8, BUN 26. Creatine in June 2024. 0.7, drug induced at [facility] vs ATN from shock;  U/A relatively bland with mild spot proteinuria; renal/bladder U/S: no hydronephrosis. hold home ARB    pt sent to ED as he had severe neck pain, seen by medicine       - last day of oral antibiotics on Sunday   - s/p IV IG infusion on 7/24/24, gets this every 3.5 weeks.   -  maintain haldol 2.5mg daily, increase haldol to 5mg hs,  Received haldol decanoate 100 mg IM on 7/5 and the next loading dose of 100 mg IM on 7/10. He will be due for maintenance dose of haldol decanoate 200 mg IM --received on 8/7/24, added cogentin 1mg bid  - CL psych recommended starting Lexapro 5,mg po on 7/26/24, increase to 10mg for 8/1

## 2024-08-08 NOTE — ED PROVIDER NOTE - CLINICAL SUMMARY MEDICAL DECISION MAKING FREE TEXT BOX
58-year-old male with past medical history of hypertension, diabetes, bipolar disorder, chronic hyponatremia, CVID, previous smoker presents to the ED complaining of neck pain for 6 months. HD stable, non-focal neuro exam. Imp: Neck pain, likely chronic with no clinical evidence to suggest spinal cord compression vs meningitis vs vertebral artery injury. Plan for labs, CTA neck, CT cervical spine, reassess.

## 2024-08-08 NOTE — CHART NOTE - NSCHARTNOTEFT_GEN_A_CORE
[facility] Inpatient to ED Transfer Summary    Reason for Transfer/Medical Summary: patient c/o severe neck pain, tremulous in bed, holding a ice pack to his neck, earlier in the day, reported that he was having neck pain for the last 8 months. patient having difficulty turning his head to the right, able to move chin towards the neck. Dr. Diggs into see the patient and recommended sending to ED. RN to give Cogentin 2mg IM stat dose now. Recommend scanning. this AM, patient with left eye conjuctivitis, started Tobramycin. of note patient has CVID and gets infusions every 3.5 weeks, is immuno-comprised.       PAST MEDICAL & SURGICAL HISTORY:  Smoker      DM (diabetes mellitus)      HTN (hypertension)      Abscess of finger      Bipolar disorder      Chronic hyponatremia      CVID (common variable immunodeficiency)      Hyponatremia      Smoker      Deviated septum      Loss of teeth due to extraction  All teeth due to dental carries          Allergies    amoxicillin (Fever)  penicillin (Rash)  Gammagard (Short breath; Rash)    Intolerances        MEDICATIONS  (STANDING):  amLODIPine   Tablet 10 milliGRAM(s) Oral daily  atorvastatin 40 milliGRAM(s) Oral at bedtime  benztropine Injectable 2 milliGRAM(s) IntraMuscular once  dextrose 5%. 1000 milliLiter(s) (50 mL/Hr) IV Continuous <Continuous>  dextrose 5%. 1000 milliLiter(s) (100 mL/Hr) IV Continuous <Continuous>  dextrose 50% Injectable 25 Gram(s) IV Push once  dextrose 50% Injectable 12.5 Gram(s) IV Push once  dextrose 50% Injectable 25 Gram(s) IV Push once  escitalopram 10 milliGRAM(s) Oral daily  glucagon  Injectable 1 milliGRAM(s) IntraMuscular once  haloperidol     Tablet 2.5 milliGRAM(s) Oral daily  haloperidol     Tablet 5 milliGRAM(s) Oral at bedtime  insulin lispro (ADMELOG) corrective regimen sliding scale   SubCutaneous three times a day before meals  insulin lispro (ADMELOG) corrective regimen sliding scale   SubCutaneous at bedtime  labetalol 200 milliGRAM(s) Oral two times a day  levothyroxine 50 MICROGram(s) Oral daily  sodium chloride 1 Gram(s) Oral three times a day  tamsulosin 0.4 milliGRAM(s) Oral at bedtime  tobramycin 0.3% Ophthalmic Solution 2 Drop(s) Left EYE four times a day    MEDICATIONS  (PRN):  acetaminophen     Tablet .. 650 milliGRAM(s) Oral every 6 hours PRN Temp greater or equal to 38C (100.4F), Mild Pain (1 - 3)  aluminum hydroxide/magnesium hydroxide/simethicone Suspension 30 milliLiter(s) Oral every 6 hours PRN Dyspepsia  AQUAPHOR (petrolatum Ointment) 1 Application(s) Topical two times a day PRN dry skin  chlorproMAZINE    Injectable 50 milliGRAM(s) IntraMuscular once PRN agitation  dextrose Oral Gel 15 Gram(s) Oral once PRN Blood Glucose LESS THAN 70 milliGRAM(s)/deciliter  haloperidol     Tablet 2.5 milliGRAM(s) Oral every 6 hours PRN psychosis  LORazepam     Tablet 1 milliGRAM(s) Oral every 6 hours PRN Anxiety  LORazepam   Injectable 2 milliGRAM(s) IntraMuscular once PRN Agitation  magnesium hydroxide Suspension 30 milliLiter(s) Oral daily PRN Constipation  melatonin. 3 milliGRAM(s) Oral at bedtime PRN Insomnia  traZODone 50 milliGRAM(s) Oral at bedtime PRN insomnia      Vital Signs Last 24 Hrs  T(C): 36.6 (08 Aug 2024 08:35), Max: 36.6 (07 Aug 2024 18:48)  T(F): 97.8 (08 Aug 2024 08:35), Max: 97.8 (07 Aug 2024 18:48)  HR: 68 (07 Aug 2024 19:47) (68 - 68)  BP: 132/72 (07 Aug 2024 19:47) (132/72 - 132/72)  BP(mean): --  RR: --  SpO2: --      CAPILLARY BLOOD GLUCOSE      POCT Blood Glucose.: 104 mg/dL (08 Aug 2024 13:47)  POCT Blood Glucose.: 78 mg/dL (08 Aug 2024 11:35)  POCT Blood Glucose.: 104 mg/dL (08 Aug 2024 07:25)  POCT Blood Glucose.: 87 mg/dL (07 Aug 2024 20:10)  POCT Blood Glucose.: 111 mg/dL (07 Aug 2024 16:12)            LABS:                    Psychiatry Section:59 yo M with PPhx significant for schizoaffective disorder, history of multiple inpatient psychiatric hospitalizations (last hospitalized at [facility] from 6/22-7/12/24), follows outpatient at [facility] with Dr. Cook, and PMH significant for HTN, HLD, hypothyroidism, CVID/ hypogammaglobulinemia (on monthly IVIG - Last dose Jun 16th, 2024), COPD not on home O2, hx of frequent skin infection who presented as initially to [facility] for  overdose of labetalol, but was also found to have left leg abscess vs cellulitis and was transferred on 7/22/24 for IVIG infusions (received 7/24/24).  Psychiatry evaluated Patient: "... somewhat childlike, but overall pleasant and engaged. He recalls that he on the day of discharge from [facility], he started having paranoid thoughts about people wanting to kill him and that others were out to get him. States that the paranoia was so distressing that he immediately thought of ending his life and overdosed on numerous labetalol pills. He started to feel nauseated and realized that he does want to live and immediately called the ambulance where he was taken to [facility]. Patient expresses that the negative thoughts, paranoia, suicidal thoughts were difficult for him when he returned home from his psychiatric hospitalization because he has a hard time talking to others about it. He reports that he follows with Dr. Cook at [facility] on an outpatient basis, and has been doing well with individualized treatment with her." Per chart review, patient was switched from abilify to haldol during his last hospitalization. At discharge he was taking haldol 10 mg PO BID. He received haldol decanoate 100 mg IM on 7/5 and the next loading dose of 100 mg IM on 7/10. recevied maintenance haldol decanoate 200 mg IM on 8/7/24.    Psychiatric Summary/[facility] admitting diagnosis: Schizoaffective d/o    Psychiatric Recommendations:    Observation status (check one):   ( x) Constant Observation  ( ) Enhanced care  ( ) Routine checks    Risk Status (check all that apply if present):  ( ) at risk for suicide/self-injury  ( ) at risk for aggressive behavior  ( ) at risk for elopement  ( ) other risk:

## 2024-08-08 NOTE — BH INPATIENT PSYCHIATRY PROGRESS NOTE - NSBHMETABOLIC_PSY_ALL_CORE_FT
BMI: BMI (kg/m2): 31.8 (08-08-24 @ 15:27)  HbA1c: A1C with Estimated Average Glucose Result: 5.8 % (07-24-24 @ 07:48)    Glucose: POCT Blood Glucose.: 104 mg/dL (08-08-24 @ 13:47)    BP: 132/72 (08-07-24 @ 19:47) (132/72 - 132/72)Vital Signs Last 24 Hrs  T(C): 36.6 (08-08-24 @ 15:27), Max: 36.6 (08-07-24 @ 18:48)  T(F): 97.9 (08-08-24 @ 15:27), Max: 97.9 (08-08-24 @ 15:27)  HR: 62 (08-08-24 @ 15:27) (62 - 68)  BP: 129/79 (08-08-24 @ 15:27) (129/79 - 132/72)  BP(mean): --  RR: 17 (08-08-24 @ 15:27) (17 - 17)  SpO2: 95% (08-08-24 @ 15:27) (95% - 95%)    Orthostatic VS  08-08-24 @ 08:35  Lying BP: --/-- HR: --  Sitting BP: 133/72 HR: 65  Standing BP: 118/68 HR: 77  Site: --  Mode: --  Orthostatic VS  08-07-24 @ 18:48  Lying BP: --/-- HR: --  Sitting BP: 131/68 HR: 58  Standing BP: --/-- HR: --  Site: --  Mode: --  Orthostatic VS  08-07-24 @ 07:30  Lying BP: --/-- HR: --  Sitting BP: 114/76 HR: 70  Standing BP: 94/60 HR: 79  Site: --  Mode: electronic  Orthostatic VS  08-06-24 @ 18:53  Lying BP: --/-- HR: --  Sitting BP: 135/72 HR: 65  Standing BP: --/-- HR: --  Site: --  Mode: --    Lipid Panel: Date/Time: 07-24-24 @ 07:48  Cholesterol, Serum: 81  LDL Cholesterol Calculated: 34  HDL Cholesterol, Serum: 30  Total Cholesterol/HDL Ration Measurement: --  Triglycerides, Serum: 84
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
## 2024-08-08 NOTE — ED PROVIDER NOTE - NSFOLLOWUPINSTRUCTIONS_ED_ALL_ED_FT
Follow up with your PMD within 1-2 days or you can call our clinic at [phone] for an appointment  Take all of your other medications as previously prescribed.  Worsening, continued or ANY new concerning symptoms return to the Emergency Department.

## 2024-08-08 NOTE — ED PROVIDER NOTE - OBJECTIVE STATEMENT
58-year-old male with past medical history of hypertension, diabetes, bipolar disorder, chronic hyponatremia, CVID, previous smoker presents to the ED complaining of neck pain for 6 months. Patient denies any trauma, he states he has been having persistent neck pain, he is currently admitted at [facility] and was transferred here for further evaluation.  Sister Susannah added to the history that patient was admitted at [facility] 2 weeks ago had a central line to the neck, and she is concerned about possible injuries.  Patient denies any weakness, numbness, tingling sensation, chest pain, shortness of breath, headache, dizziness, neck stiffness, fevers, chills, or any other associated symptoms.

## 2024-08-08 NOTE — ED ADULT NURSE NOTE - OBJECTIVE STATEMENT
Pt c/o neck pain x 6 months.  Pain meds administered as ordered.  Pt accompanied with michelle staff member. Calm and cooperative throughout. Labs and ct scan performed. Pt discharged to home after IV fluids finished. IV removed. Pt taken back to [facility] with staff member and security. GARCIA Potter gave report to Michelle ZELAYA prior to pt departure.

## 2024-08-08 NOTE — BH INPATIENT PSYCHIATRY PROGRESS NOTE - CURRENT MEDICATION
MEDICATIONS  (STANDING):  amLODIPine   Tablet 10 milliGRAM(s) Oral daily  atorvastatin 40 milliGRAM(s) Oral at bedtime  dextrose 5%. 1000 milliLiter(s) (50 mL/Hr) IV Continuous <Continuous>  dextrose 5%. 1000 milliLiter(s) (100 mL/Hr) IV Continuous <Continuous>  dextrose 50% Injectable 25 Gram(s) IV Push once  dextrose 50% Injectable 12.5 Gram(s) IV Push once  dextrose 50% Injectable 25 Gram(s) IV Push once  escitalopram 10 milliGRAM(s) Oral daily  glucagon  Injectable 1 milliGRAM(s) IntraMuscular once  haloperidol     Tablet 2.5 milliGRAM(s) Oral daily  haloperidol     Tablet 5 milliGRAM(s) Oral at bedtime  insulin lispro (ADMELOG) corrective regimen sliding scale   SubCutaneous three times a day before meals  insulin lispro (ADMELOG) corrective regimen sliding scale   SubCutaneous at bedtime  labetalol 200 milliGRAM(s) Oral two times a day  levothyroxine 50 MICROGram(s) Oral daily  sodium chloride 1 Gram(s) Oral three times a day  tamsulosin 0.4 milliGRAM(s) Oral at bedtime  tobramycin 0.3% Ophthalmic Solution 2 Drop(s) Left EYE four times a day    MEDICATIONS  (PRN):  acetaminophen     Tablet .. 650 milliGRAM(s) Oral every 6 hours PRN Temp greater or equal to 38C (100.4F), Mild Pain (1 - 3)  aluminum hydroxide/magnesium hydroxide/simethicone Suspension 30 milliLiter(s) Oral every 6 hours PRN Dyspepsia  AQUAPHOR (petrolatum Ointment) 1 Application(s) Topical two times a day PRN dry skin  chlorproMAZINE    Injectable 50 milliGRAM(s) IntraMuscular once PRN agitation  dextrose Oral Gel 15 Gram(s) Oral once PRN Blood Glucose LESS THAN 70 milliGRAM(s)/deciliter  haloperidol     Tablet 2.5 milliGRAM(s) Oral every 6 hours PRN psychosis  LORazepam     Tablet 1 milliGRAM(s) Oral every 6 hours PRN Anxiety  LORazepam   Injectable 2 milliGRAM(s) IntraMuscular once PRN Agitation  magnesium hydroxide Suspension 30 milliLiter(s) Oral daily PRN Constipation  melatonin. 3 milliGRAM(s) Oral at bedtime PRN Insomnia  traZODone 50 milliGRAM(s) Oral at bedtime PRN insomnia

## 2024-08-08 NOTE — ED PROVIDER NOTE - PATIENT PORTAL LINK FT
You can access the FollowMyHealth Patient Portal offered by [facility] by registering at the following website: [email or web address]. By joining [employer]’s FollowMyHealth portal, you will also be able to view your health information using other applications (apps) compatible with our system.

## 2024-08-08 NOTE — ED PROVIDER NOTE - CROS ED ROS STATEMENT
If you have any questions regarding your visit, Please contact your care team.     [facility]: [phone]  [facility] CLINIC HOURS TELEPHONE NUMBER       Jules Barclay M.D.        Marisol-    Malgorzata Gonzales-Medical Assistant       Monday-[place]  8:00a.m-4:45 p.m  Tuesday-[place]  9:00a.m-4:00 p.m  Wednesday-[place] 8:00a.m-4:45 p.m.  Thursday-[place]  8:00a.m-4:45 p.m.  Friday-[place]  8:00a.m-4:45 p.m. [facility]  [address] [place] [address]  [phone] ask for [facility]  [phone] Fax  Imaging [phone]    [facility]  [address] [place] [address] [place] [address]  [phone]    [facility]  [address] [place]  [place] [address]  [phone] ask [facility]  [phone] Fax  Imaging [phone]     Urgent Care locations:    [facility] Monday-Friday  5 pm - 9 pm  Saturday and Sunday   9 am - 5 pm  Monday-Friday   11 am - 9 pm  Saturday and Sunday   9 am - 5 pm   [phone]   If you need a medication refill, please contact your pharmacy. Please allow 3 business days for your refill to be completed.  As always, Thank you for trusting us with your healthcare needs!     all other ROS negative except as per HPI

## 2024-08-09 LAB
GLUCOSE BLDC GLUCOMTR-MCNC: 100 MG/DL — HIGH (ref 70–99)
GLUCOSE BLDC GLUCOMTR-MCNC: 126 MG/DL — HIGH (ref 70–99)
GLUCOSE BLDC GLUCOMTR-MCNC: 94 MG/DL — SIGNIFICANT CHANGE UP (ref 70–99)
GLUCOSE BLDC GLUCOMTR-MCNC: 97 MG/DL — SIGNIFICANT CHANGE UP (ref 70–99)

## 2024-08-09 PROCEDURE — 90853 GROUP PSYCHOTHERAPY: CPT

## 2024-08-09 PROCEDURE — 99232 SBSQ HOSP IP/OBS MODERATE 35: CPT

## 2024-08-09 RX ORDER — LORAZEPAM 4 MG/ML
1 INJECTION INTRAMUSCULAR; INTRAVENOUS EVERY 6 HOURS
Refills: 0 | Status: DISCONTINUED | OUTPATIENT
Start: 2024-08-09 | End: 2024-08-13

## 2024-08-09 RX ORDER — LIDOCAINE/BENZALKONIUM/ALCOHOL
1 SOLUTION, NON-ORAL TOPICAL DAILY
Refills: 0 | Status: DISCONTINUED | OUTPATIENT
Start: 2024-08-09 | End: 2024-08-14

## 2024-08-09 RX ADMIN — Medication 0: at 20:14

## 2024-08-09 RX ADMIN — Medication 50 MICROGRAM(S): at 06:45

## 2024-08-09 RX ADMIN — Medication 2 DROP(S): at 12:35

## 2024-08-09 RX ADMIN — Medication 50 MILLIGRAM(S): at 20:12

## 2024-08-09 RX ADMIN — Medication 5 MILLIGRAM(S): at 20:10

## 2024-08-09 RX ADMIN — Medication 40 MILLIGRAM(S): at 20:10

## 2024-08-09 RX ADMIN — Medication 40 MILLIGRAM(S): at 00:11

## 2024-08-09 RX ADMIN — ESCITALOPRAM OXALATE 10 MILLIGRAM(S): 10 TABLET ORAL at 08:53

## 2024-08-09 RX ADMIN — AMLODIPINE BESYLATE 10 MILLIGRAM(S): 10 TABLET ORAL at 08:53

## 2024-08-09 RX ADMIN — Medication 200 MILLIGRAM(S): at 20:10

## 2024-08-09 RX ADMIN — Medication 200 MILLIGRAM(S): at 08:53

## 2024-08-09 RX ADMIN — SODIUM CHLORIDE 1 GRAM(S): 9 INJECTION INTRAMUSCULAR; INTRAVENOUS; SUBCUTANEOUS at 12:36

## 2024-08-09 RX ADMIN — SODIUM CHLORIDE 1 GRAM(S): 9 INJECTION INTRAMUSCULAR; INTRAVENOUS; SUBCUTANEOUS at 20:10

## 2024-08-09 RX ADMIN — Medication 2 DROP(S): at 08:55

## 2024-08-09 RX ADMIN — BENZTROPINE MESYLATE 1 MILLIGRAM(S): 2 TABLET ORAL at 20:10

## 2024-08-09 RX ADMIN — TAMSULOSIN HYDROCHLORIDE 0.4 MILLIGRAM(S): 0.4 CAPSULE ORAL at 20:10

## 2024-08-09 RX ADMIN — Medication 2 DROP(S): at 17:09

## 2024-08-09 RX ADMIN — SODIUM CHLORIDE 1 GRAM(S): 9 INJECTION INTRAMUSCULAR; INTRAVENOUS; SUBCUTANEOUS at 00:13

## 2024-08-09 RX ADMIN — SODIUM CHLORIDE 1 GRAM(S): 9 INJECTION INTRAMUSCULAR; INTRAVENOUS; SUBCUTANEOUS at 08:54

## 2024-08-09 RX ADMIN — Medication 2 DROP(S): at 00:12

## 2024-08-09 RX ADMIN — Medication 5 MILLIGRAM(S): at 00:11

## 2024-08-09 RX ADMIN — Medication 200 MILLIGRAM(S): at 00:10

## 2024-08-09 RX ADMIN — TAMSULOSIN HYDROCHLORIDE 0.4 MILLIGRAM(S): 0.4 CAPSULE ORAL at 00:11

## 2024-08-09 RX ADMIN — ACETAMINOPHEN 650 MILLIGRAM(S): 325 TABLET ORAL at 03:02

## 2024-08-09 RX ADMIN — BENZTROPINE MESYLATE 1 MILLIGRAM(S): 2 TABLET ORAL at 08:54

## 2024-08-09 RX ADMIN — BENZTROPINE MESYLATE 1 MILLIGRAM(S): 2 TABLET ORAL at 00:14

## 2024-08-09 RX ADMIN — Medication 2 DROP(S): at 20:12

## 2024-08-09 NOTE — ED POST DISCHARGE NOTE - DETAILS
Patient's sister Susannah (health care proxy) called to obtain the results of this study. I reported them to her

## 2024-08-09 NOTE — BH INPATIENT PSYCHIATRY PROGRESS NOTE - CURRENT MEDICATION
MEDICATIONS  (STANDING):  amLODIPine   Tablet 10 milliGRAM(s) Oral daily  atorvastatin 40 milliGRAM(s) Oral at bedtime  benztropine 1 milliGRAM(s) Oral two times a day  dextrose 5%. 1000 milliLiter(s) (50 mL/Hr) IV Continuous <Continuous>  dextrose 5%. 1000 milliLiter(s) (100 mL/Hr) IV Continuous <Continuous>  dextrose 50% Injectable 25 Gram(s) IV Push once  dextrose 50% Injectable 12.5 Gram(s) IV Push once  dextrose 50% Injectable 25 Gram(s) IV Push once  escitalopram 10 milliGRAM(s) Oral daily  glucagon  Injectable 1 milliGRAM(s) IntraMuscular once  haloperidol     Tablet 5 milliGRAM(s) Oral at bedtime  insulin lispro (ADMELOG) corrective regimen sliding scale   SubCutaneous three times a day before meals  insulin lispro (ADMELOG) corrective regimen sliding scale   SubCutaneous at bedtime  labetalol 200 milliGRAM(s) Oral two times a day  levothyroxine 50 MICROGram(s) Oral daily  sodium chloride 1 Gram(s) Oral three times a day  tamsulosin 0.4 milliGRAM(s) Oral at bedtime  tobramycin 0.3% Ophthalmic Solution 2 Drop(s) Left EYE four times a day    MEDICATIONS  (PRN):  acetaminophen     Tablet .. 650 milliGRAM(s) Oral every 6 hours PRN Temp greater or equal to 38C (100.4F), Mild Pain (1 - 3)  aluminum hydroxide/magnesium hydroxide/simethicone Suspension 30 milliLiter(s) Oral every 6 hours PRN Dyspepsia  AQUAPHOR (petrolatum Ointment) 1 Application(s) Topical two times a day PRN dry skin  chlorproMAZINE    Injectable 50 milliGRAM(s) IntraMuscular once PRN agitation  dextrose Oral Gel 15 Gram(s) Oral once PRN Blood Glucose LESS THAN 70 milliGRAM(s)/deciliter  haloperidol     Tablet 2.5 milliGRAM(s) Oral every 6 hours PRN psychosis  LORazepam     Tablet 1 milliGRAM(s) Oral every 6 hours PRN Anxiety  LORazepam   Injectable 2 milliGRAM(s) IntraMuscular once PRN Agitation  magnesium hydroxide Suspension 30 milliLiter(s) Oral daily PRN Constipation  melatonin. 3 milliGRAM(s) Oral at bedtime PRN Insomnia  traZODone 50 milliGRAM(s) Oral at bedtime PRN insomnia

## 2024-08-09 NOTE — BH INPATIENT PSYCHIATRY PROGRESS NOTE - NSBHASSESSSUMMFT_PSY_ALL_CORE
59 yo M with PPhx significant for schizoaffective disorder, history of multiple inpatient psychiatric hospitalizations (last hospitalized at [facility] from 6/22-7/12/24), follows outpatient at [facility] with Dr. Cook, and PMH significant for HTN, HLD, hypothyroidism, CVID/ hypogammaglobulinemia (on monthly IVIG - Last dose Jun 16th, 2024), COPD not on home O2, hx of frequent skin infection who presented as initially to [facility] for  overdose of labetalol, but was also found to have left leg abscess vs cellulitis and was transferred on 7/22/24 for IVIG infusions (received 7/24/24).  Psychiatry evaluated Patient: "... somewhat childlike, but overall pleasant and engaged. He recalls that he on the day of discharge from [facility], he started having paranoid thoughts about people wanting to kill him and that others were out to get him. States that the paranoia was so distressing that he immediately thought of ending his life and overdosed on numerous labetalol pills. He started to feel nauseated and realized that he does want to live and immediately called the ambulance where he was taken to [facility]. Patient expresses that the negative thoughts, paranoia, suicidal thoughts were difficult for him when he returned home from his psychiatric hospitalization because he has a hard time talking to others about it. He reports that he follows with Dr. Cook at [facility] on an outpatient basis, and has been doing well with individualized treatment with her." Per chart review, patient was switched from abilify to haldol during his last hospitalization. At discharge he was taking haldol 10 mg PO BID. He received haldol decanoate 100 mg IM on 7/5 and the next loading dose of 100 mg IM on 7/10. He will be due for maintenance dose of haldol decanoate 200 mg IM on 8/8/24. Patient reports that he recalls that he received 2 of the haldol shots and the plan was to continue treatment on outpatient basis.   AT [facility]:  Left lateral leg w/ pain and purulence. Immunocompromised given hx of CVID. Currently w/o systemic signs of infection. MRSA sputum and nares + in OSH. s/p Vanc --> transitioned to Bactrim (Started on 7/13), s/p course of vanco at [facility], c/w doxycycline through 7/28; need for MRI. pt also very claustrophobic. MRI order d/c. 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 Mitchell Boxer. s/p IVIG x1 at [facility] 07/24/24. TASH -  Elevated creatine 1.8, BUN 26. Creatine in June 2024. 0.7, drug induced at [facility] vs ATN from shock;  U/A relatively bland with mild spot proteinuria; renal/bladder U/S: no hydronephrosis. hold home ARB      - last day of oral antibiotics on Sunday   - s/p IV IG infusion on 7/24/24, gets this every 3.5 weeks, next on 8/15 at 0830 AM.   -  d/c haldol 2.5mg daily, increase haldol to 5mg hs,  Received haldol decanoate 100 mg IM on 7/5 and the next loading dose of 100 mg IM on 7/10. He will be due for maintenance dose of haldol decanoate 200 mg IM --received on 8/7/24, added cogentin 1mg bid  - [facility] psych recommended starting Lexapro 5,mg po on 7/26/24, increase to 10mg for 8/1  --Lidocaine patch ordered for cervical region pain.

## 2024-08-09 NOTE — BH INPATIENT PSYCHIATRY PROGRESS NOTE - NSBHFUPINTERVALHXFT_PSY_A_CORE
f/up SAD, care discussed w/ tx team, vitals stable, no issues overnight. platelets at 120 last night, discussed the platelets with Dr Diggs, can repeat cbc-d next week. Patient reported feeling much better, reported that the pain has resolved, denied feeling guilty about his father, stating that it was his problem that he was drinking. Patient denied distressing thoughts about his past. observed to be on the phone and out in the milieu. spontaneously told writer that he will be attending groups. reported fair sleep and appetite. as per RN wound on his left hip improving. spoke to both sisters Brittany and Susannah and discussed tx plan, ED f/up, scan results.

## 2024-08-10 LAB
GLUCOSE BLDC GLUCOMTR-MCNC: 101 MG/DL — HIGH (ref 70–99)
GLUCOSE BLDC GLUCOMTR-MCNC: 105 MG/DL — HIGH (ref 70–99)
GLUCOSE BLDC GLUCOMTR-MCNC: 106 MG/DL — HIGH (ref 70–99)
GLUCOSE BLDC GLUCOMTR-MCNC: 94 MG/DL — SIGNIFICANT CHANGE UP (ref 70–99)

## 2024-08-10 RX ADMIN — Medication 40 MILLIGRAM(S): at 20:00

## 2024-08-10 RX ADMIN — BENZTROPINE MESYLATE 1 MILLIGRAM(S): 2 TABLET ORAL at 20:01

## 2024-08-10 RX ADMIN — SODIUM CHLORIDE 1 GRAM(S): 9 INJECTION INTRAMUSCULAR; INTRAVENOUS; SUBCUTANEOUS at 08:09

## 2024-08-10 RX ADMIN — SODIUM CHLORIDE 1 GRAM(S): 9 INJECTION INTRAMUSCULAR; INTRAVENOUS; SUBCUTANEOUS at 20:00

## 2024-08-10 RX ADMIN — PETROLATUM 1 APPLICATION(S): 865 OINTMENT TOPICAL at 07:26

## 2024-08-10 RX ADMIN — Medication 0: at 20:39

## 2024-08-10 RX ADMIN — Medication 2 DROP(S): at 13:52

## 2024-08-10 RX ADMIN — Medication 1 PATCH: at 20:40

## 2024-08-10 RX ADMIN — ESCITALOPRAM OXALATE 10 MILLIGRAM(S): 10 TABLET ORAL at 08:10

## 2024-08-10 RX ADMIN — Medication 2 DROP(S): at 18:16

## 2024-08-10 RX ADMIN — Medication 200 MILLIGRAM(S): at 08:09

## 2024-08-10 RX ADMIN — BENZTROPINE MESYLATE 1 MILLIGRAM(S): 2 TABLET ORAL at 08:09

## 2024-08-10 RX ADMIN — SODIUM CHLORIDE 1 GRAM(S): 9 INJECTION INTRAMUSCULAR; INTRAVENOUS; SUBCUTANEOUS at 13:52

## 2024-08-10 RX ADMIN — Medication 3 MILLIGRAM(S): at 20:02

## 2024-08-10 RX ADMIN — Medication 200 MILLIGRAM(S): at 20:00

## 2024-08-10 RX ADMIN — Medication 50 MICROGRAM(S): at 05:34

## 2024-08-10 RX ADMIN — Medication 1 PATCH: at 08:09

## 2024-08-10 RX ADMIN — Medication 2 DROP(S): at 20:39

## 2024-08-10 RX ADMIN — Medication 5 MILLIGRAM(S): at 20:00

## 2024-08-10 RX ADMIN — Medication 2 DROP(S): at 08:10

## 2024-08-10 RX ADMIN — AMLODIPINE BESYLATE 10 MILLIGRAM(S): 10 TABLET ORAL at 08:10

## 2024-08-10 RX ADMIN — TAMSULOSIN HYDROCHLORIDE 0.4 MILLIGRAM(S): 0.4 CAPSULE ORAL at 20:01

## 2024-08-11 LAB
GLUCOSE BLDC GLUCOMTR-MCNC: 104 MG/DL — HIGH (ref 70–99)
GLUCOSE BLDC GLUCOMTR-MCNC: 105 MG/DL — HIGH (ref 70–99)
GLUCOSE BLDC GLUCOMTR-MCNC: 137 MG/DL — HIGH (ref 70–99)
GLUCOSE BLDC GLUCOMTR-MCNC: 303 MG/DL — HIGH (ref 70–99)
GLUCOSE BLDC GLUCOMTR-MCNC: 95 MG/DL — SIGNIFICANT CHANGE UP (ref 70–99)

## 2024-08-11 RX ADMIN — ESCITALOPRAM OXALATE 10 MILLIGRAM(S): 10 TABLET ORAL at 08:36

## 2024-08-11 RX ADMIN — TAMSULOSIN HYDROCHLORIDE 0.4 MILLIGRAM(S): 0.4 CAPSULE ORAL at 20:08

## 2024-08-11 RX ADMIN — AMLODIPINE BESYLATE 10 MILLIGRAM(S): 10 TABLET ORAL at 08:36

## 2024-08-11 RX ADMIN — Medication 200 MILLIGRAM(S): at 08:36

## 2024-08-11 RX ADMIN — Medication 40 MILLIGRAM(S): at 20:08

## 2024-08-11 RX ADMIN — Medication 200 MILLIGRAM(S): at 20:07

## 2024-08-11 RX ADMIN — Medication 3 MILLIGRAM(S): at 20:08

## 2024-08-11 RX ADMIN — Medication 5 MILLIGRAM(S): at 20:08

## 2024-08-11 RX ADMIN — BENZTROPINE MESYLATE 1 MILLIGRAM(S): 2 TABLET ORAL at 20:07

## 2024-08-11 RX ADMIN — SODIUM CHLORIDE 1 GRAM(S): 9 INJECTION INTRAMUSCULAR; INTRAVENOUS; SUBCUTANEOUS at 12:25

## 2024-08-11 RX ADMIN — Medication 50 MICROGRAM(S): at 05:01

## 2024-08-11 RX ADMIN — Medication 2 DROP(S): at 12:15

## 2024-08-11 RX ADMIN — BENZTROPINE MESYLATE 1 MILLIGRAM(S): 2 TABLET ORAL at 08:35

## 2024-08-11 RX ADMIN — SODIUM CHLORIDE 1 GRAM(S): 9 INJECTION INTRAMUSCULAR; INTRAVENOUS; SUBCUTANEOUS at 20:08

## 2024-08-11 RX ADMIN — SODIUM CHLORIDE 1 GRAM(S): 9 INJECTION INTRAMUSCULAR; INTRAVENOUS; SUBCUTANEOUS at 08:35

## 2024-08-12 LAB
BASOPHILS # BLD AUTO: 0.04 K/UL — SIGNIFICANT CHANGE UP (ref 0–0.2)
BASOPHILS NFR BLD AUTO: 0.9 % — SIGNIFICANT CHANGE UP (ref 0–2)
EOSINOPHIL # BLD AUTO: 0.28 K/UL — SIGNIFICANT CHANGE UP (ref 0–0.5)
EOSINOPHIL NFR BLD AUTO: 6.1 % — HIGH (ref 0–6)
GLUCOSE BLDC GLUCOMTR-MCNC: 104 MG/DL — HIGH (ref 70–99)
GLUCOSE BLDC GLUCOMTR-MCNC: 113 MG/DL — HIGH (ref 70–99)
GLUCOSE BLDC GLUCOMTR-MCNC: 115 MG/DL — HIGH (ref 70–99)
GLUCOSE BLDC GLUCOMTR-MCNC: 85 MG/DL — SIGNIFICANT CHANGE UP (ref 70–99)
HCT VFR BLD CALC: 32.4 % — LOW (ref 39–50)
HGB BLD-MCNC: 11 G/DL — LOW (ref 13–17)
IANC: 2.91 K/UL — SIGNIFICANT CHANGE UP (ref 1.8–7.4)
IMM GRANULOCYTES NFR BLD AUTO: 0.4 % — SIGNIFICANT CHANGE UP (ref 0–0.9)
LYMPHOCYTES # BLD AUTO: 0.85 K/UL — LOW (ref 1–3.3)
LYMPHOCYTES # BLD AUTO: 18.4 % — SIGNIFICANT CHANGE UP (ref 13–44)
MCHC RBC-ENTMCNC: 27.9 PG — SIGNIFICANT CHANGE UP (ref 27–34)
MCHC RBC-ENTMCNC: 34 GM/DL — SIGNIFICANT CHANGE UP (ref 32–36)
MCV RBC AUTO: 82.2 FL — SIGNIFICANT CHANGE UP (ref 80–100)
MONOCYTES # BLD AUTO: 0.51 K/UL — SIGNIFICANT CHANGE UP (ref 0–0.9)
MONOCYTES NFR BLD AUTO: 11.1 % — SIGNIFICANT CHANGE UP (ref 2–14)
NEUTROPHILS # BLD AUTO: 2.91 K/UL — SIGNIFICANT CHANGE UP (ref 1.8–7.4)
NEUTROPHILS NFR BLD AUTO: 63.1 % — SIGNIFICANT CHANGE UP (ref 43–77)
NRBC # BLD: 0 /100 WBCS — SIGNIFICANT CHANGE UP (ref 0–0)
NRBC # FLD: 0 K/UL — SIGNIFICANT CHANGE UP (ref 0–0)
PLATELET # BLD AUTO: 135 K/UL — LOW (ref 150–400)
RBC # BLD: 3.94 M/UL — LOW (ref 4.2–5.8)
RBC # FLD: 14.5 % — SIGNIFICANT CHANGE UP (ref 10.3–14.5)
WBC # BLD: 4.61 K/UL — SIGNIFICANT CHANGE UP (ref 3.8–10.5)
WBC # FLD AUTO: 4.61 K/UL — SIGNIFICANT CHANGE UP (ref 3.8–10.5)

## 2024-08-12 PROCEDURE — 99232 SBSQ HOSP IP/OBS MODERATE 35: CPT

## 2024-08-12 RX ADMIN — SODIUM CHLORIDE 1 GRAM(S): 9 INJECTION INTRAMUSCULAR; INTRAVENOUS; SUBCUTANEOUS at 11:30

## 2024-08-12 RX ADMIN — Medication 5 MILLIGRAM(S): at 20:01

## 2024-08-12 RX ADMIN — AMLODIPINE BESYLATE 10 MILLIGRAM(S): 10 TABLET ORAL at 08:19

## 2024-08-12 RX ADMIN — Medication 200 MILLIGRAM(S): at 20:01

## 2024-08-12 RX ADMIN — BENZTROPINE MESYLATE 1 MILLIGRAM(S): 2 TABLET ORAL at 20:01

## 2024-08-12 RX ADMIN — Medication 2 DROP(S): at 17:38

## 2024-08-12 RX ADMIN — SODIUM CHLORIDE 1 GRAM(S): 9 INJECTION INTRAMUSCULAR; INTRAVENOUS; SUBCUTANEOUS at 13:35

## 2024-08-12 RX ADMIN — Medication 3 MILLIGRAM(S): at 21:31

## 2024-08-12 RX ADMIN — Medication 40 MILLIGRAM(S): at 20:01

## 2024-08-12 RX ADMIN — BENZTROPINE MESYLATE 1 MILLIGRAM(S): 2 TABLET ORAL at 08:19

## 2024-08-12 RX ADMIN — Medication 2 DROP(S): at 21:01

## 2024-08-12 RX ADMIN — Medication 2 DROP(S): at 11:30

## 2024-08-12 RX ADMIN — TAMSULOSIN HYDROCHLORIDE 0.4 MILLIGRAM(S): 0.4 CAPSULE ORAL at 20:00

## 2024-08-12 RX ADMIN — Medication 200 MILLIGRAM(S): at 08:19

## 2024-08-12 RX ADMIN — Medication 50 MICROGRAM(S): at 06:01

## 2024-08-12 RX ADMIN — Medication 2 DROP(S): at 13:07

## 2024-08-12 RX ADMIN — SODIUM CHLORIDE 1 GRAM(S): 9 INJECTION INTRAMUSCULAR; INTRAVENOUS; SUBCUTANEOUS at 20:01

## 2024-08-12 RX ADMIN — ESCITALOPRAM OXALATE 10 MILLIGRAM(S): 10 TABLET ORAL at 08:19

## 2024-08-12 NOTE — BH INPATIENT PSYCHIATRY PROGRESS NOTE - NSBHMSETHTCONTENT_PSY_A_CORE
history of paranoid delusions/Delusions/Other
history of paranoid delusions/Delusions
history of paranoid delusions/Delusions/Preoccupations/Ruminations/Guilt
history of paranoid delusions/Delusions/Other
history of paranoid delusions/Delusions/Preoccupations/Ruminations/Guilt
history of paranoid delusions/Delusions/Preoccupations/Ruminations/Guilt
history of paranoid delusions/Delusions
history of paranoid delusions/Delusions/Preoccupations
history of paranoid delusions/Delusions/Preoccupations/Guilt
history of paranoid delusions/Unremarkable
history of paranoid delusions/Delusions/Preoccupations/Ruminations/Guilt

## 2024-08-12 NOTE — BH INPATIENT PSYCHIATRY PROGRESS NOTE - NSBHMSETHTPROC_PSY_A_CORE
Linear/Other
Other
Linear/Other
Other
Linear/Other
Other

## 2024-08-12 NOTE — BH INPATIENT PSYCHIATRY PROGRESS NOTE - NSBHMSELANG_PSY_A_CORE
No abnormalities noted
No abnormalities noted
Male
No abnormalities noted

## 2024-08-12 NOTE — BH INPATIENT PSYCHIATRY PROGRESS NOTE - CURRENT MEDICATION
MEDICATIONS  (STANDING):  amLODIPine   Tablet 10 milliGRAM(s) Oral daily  atorvastatin 40 milliGRAM(s) Oral at bedtime  benztropine 1 milliGRAM(s) Oral two times a day  escitalopram 10 milliGRAM(s) Oral daily  glucagon  Injectable 1 milliGRAM(s) IntraMuscular once  haloperidol     Tablet 5 milliGRAM(s) Oral at bedtime  insulin lispro (ADMELOG) corrective regimen sliding scale   SubCutaneous three times a day before meals  insulin lispro (ADMELOG) corrective regimen sliding scale   SubCutaneous at bedtime  labetalol 200 milliGRAM(s) Oral two times a day  levothyroxine 50 MICROGram(s) Oral daily  lidocaine   4% Patch 1 Patch Transdermal daily  sodium chloride 1 Gram(s) Oral three times a day  tamsulosin 0.4 milliGRAM(s) Oral at bedtime  tobramycin 0.3% Ophthalmic Solution 2 Drop(s) Left EYE four times a day    MEDICATIONS  (PRN):  acetaminophen     Tablet .. 650 milliGRAM(s) Oral every 6 hours PRN Temp greater or equal to 38C (100.4F), Mild Pain (1 - 3)  aluminum hydroxide/magnesium hydroxide/simethicone Suspension 30 milliLiter(s) Oral every 6 hours PRN Dyspepsia  AQUAPHOR (petrolatum Ointment) 1 Application(s) Topical two times a day PRN dry skin  chlorproMAZINE    Injectable 50 milliGRAM(s) IntraMuscular once PRN agitation  dextrose Oral Gel 15 Gram(s) Oral once PRN Blood Glucose LESS THAN 70 milliGRAM(s)/deciliter  haloperidol     Tablet 2.5 milliGRAM(s) Oral every 6 hours PRN psychosis  LORazepam     Tablet 1 milliGRAM(s) Oral every 6 hours PRN Anxiety  LORazepam   Injectable 2 milliGRAM(s) IntraMuscular once PRN Agitation  magnesium hydroxide Suspension 30 milliLiter(s) Oral daily PRN Constipation  melatonin. 3 milliGRAM(s) Oral at bedtime PRN Insomnia  traZODone 50 milliGRAM(s) Oral at bedtime PRN insomnia   MEDICATIONS  (STANDING):  amLODIPine   Tablet 10 milliGRAM(s) Oral daily  atorvastatin 40 milliGRAM(s) Oral at bedtime  benztropine 1 milliGRAM(s) Oral two times a day  escitalopram 10 milliGRAM(s) Oral daily  glucagon  Injectable 1 milliGRAM(s) IntraMuscular once  haloperidol     Tablet 5 milliGRAM(s) Oral at bedtime  labetalol 200 milliGRAM(s) Oral two times a day  levothyroxine 50 MICROGram(s) Oral daily  lidocaine   4% Patch 1 Patch Transdermal daily  sodium chloride 1 Gram(s) Oral three times a day  tamsulosin 0.4 milliGRAM(s) Oral at bedtime  tobramycin 0.3% Ophthalmic Solution 2 Drop(s) Left EYE four times a day    MEDICATIONS  (PRN):  acetaminophen     Tablet .. 650 milliGRAM(s) Oral every 6 hours PRN Temp greater or equal to 38C (100.4F), Mild Pain (1 - 3)  aluminum hydroxide/magnesium hydroxide/simethicone Suspension 30 milliLiter(s) Oral every 6 hours PRN Dyspepsia  AQUAPHOR (petrolatum Ointment) 1 Application(s) Topical two times a day PRN dry skin  chlorproMAZINE    Injectable 50 milliGRAM(s) IntraMuscular once PRN agitation  haloperidol     Tablet 2.5 milliGRAM(s) Oral every 6 hours PRN psychosis  LORazepam     Tablet 1 milliGRAM(s) Oral every 6 hours PRN Anxiety  LORazepam   Injectable 2 milliGRAM(s) IntraMuscular once PRN Agitation  magnesium hydroxide Suspension 30 milliLiter(s) Oral daily PRN Constipation  melatonin. 3 milliGRAM(s) Oral at bedtime PRN Insomnia  traZODone 50 milliGRAM(s) Oral at bedtime PRN insomnia

## 2024-08-12 NOTE — BH INPATIENT PSYCHIATRY PROGRESS NOTE - PRN MEDS
MEDICATIONS  (PRN):  acetaminophen     Tablet .. 650 milliGRAM(s) Oral every 6 hours PRN Temp greater or equal to 38C (100.4F), Mild Pain (1 - 3)  aluminum hydroxide/magnesium hydroxide/simethicone Suspension 30 milliLiter(s) Oral every 6 hours PRN Dyspepsia  AQUAPHOR (petrolatum Ointment) 1 Application(s) Topical two times a day PRN dry skin  chlorproMAZINE    Injectable 50 milliGRAM(s) IntraMuscular once PRN agitation  dextrose Oral Gel 15 Gram(s) Oral once PRN Blood Glucose LESS THAN 70 milliGRAM(s)/deciliter  haloperidol     Tablet 2.5 milliGRAM(s) Oral every 6 hours PRN psychosis  LORazepam     Tablet 1 milliGRAM(s) Oral every 6 hours PRN Anxiety  LORazepam   Injectable 2 milliGRAM(s) IntraMuscular once PRN Agitation  magnesium hydroxide Suspension 30 milliLiter(s) Oral daily PRN Constipation  melatonin. 3 milliGRAM(s) Oral at bedtime PRN Insomnia  traZODone 50 milliGRAM(s) Oral at bedtime PRN insomnia   MEDICATIONS  (PRN):  acetaminophen     Tablet .. 650 milliGRAM(s) Oral every 6 hours PRN Temp greater or equal to 38C (100.4F), Mild Pain (1 - 3)  aluminum hydroxide/magnesium hydroxide/simethicone Suspension 30 milliLiter(s) Oral every 6 hours PRN Dyspepsia  AQUAPHOR (petrolatum Ointment) 1 Application(s) Topical two times a day PRN dry skin  chlorproMAZINE    Injectable 50 milliGRAM(s) IntraMuscular once PRN agitation  haloperidol     Tablet 2.5 milliGRAM(s) Oral every 6 hours PRN psychosis  LORazepam     Tablet 1 milliGRAM(s) Oral every 6 hours PRN Anxiety  LORazepam   Injectable 2 milliGRAM(s) IntraMuscular once PRN Agitation  magnesium hydroxide Suspension 30 milliLiter(s) Oral daily PRN Constipation  melatonin. 3 milliGRAM(s) Oral at bedtime PRN Insomnia  traZODone 50 milliGRAM(s) Oral at bedtime PRN insomnia

## 2024-08-12 NOTE — BH INPATIENT PSYCHIATRY PROGRESS NOTE - NSBHMSEMOOD_PSY_A_CORE
"Fine right now"/Normal

## 2024-08-12 NOTE — BH INPATIENT PSYCHIATRY PROGRESS NOTE - NSBHFUPINTERVALHXFT_PSY_A_CORE
No acute events over the weekend. Pt compliant with meds. He is sleeping well and appetite is good. Today he reports feeling very hopeful and "grateful." Denies SIIP and HIIP. Denies feeling paranoid. Denies AH and VH. Observed taking fresh air breaks. Says that neck pain has resolved since trip to ED last week. Pt offers no other complaints.

## 2024-08-12 NOTE — BH INPATIENT PSYCHIATRY PROGRESS NOTE - NSBHMSEPERCEPT_PSY_A_CORE
denies current AVH/No abnormalities

## 2024-08-12 NOTE — BH INPATIENT PSYCHIATRY PROGRESS NOTE - NSBHASSESSSUMMFT_PSY_ALL_CORE
Pt is 57yo single man with PPHx of dx schoizoaffective d/o, hx of many prior hospitalizations (last at [facility] June 2024), in outpt tx at [facility] AOPD, w/ PMHx HTN, HLD, hypothyroidism, and CVID/ hypogammaglobulinemia (on monthly IVIG - last dose 6/16/24, then 7/24/24), COPD not on home O2, hx of frequent skin infection who presented initially to [facility] for SA by overdose of labetalol, found to have left leg abscess vs cellulitis and was transferred to [facility] on 7/22/24 for IVIG infusions (received 7/24/24), followed by psych CL, then medically cleared and transferred to [facility] for further psychiatric treatment.    Psychosis improving and mood now stable. Denies SIIP.     Plan:  Psych:  - S/p Haldol Dec 200mg IM on 8/7/24, next due 9/4/24; continuing with PO Haldol 5mg qhs for now  - C/w Cogentin 1mg BID for now, goal to taper   - C/w Lexapro 10mg daily     Medical:  - CVID: IVIG infusion q3.5weeks (last 7/24, next scheduled for 8/15)  - Conjunctivitis: c/w Tobramycin drops QID (d/c 8/13)  - HTN: Norvasc 10mg daily, Labetalol 200mg BID  - HLD: Lipitor 40mg qhs  - Hypothyroidism: Synthroid 50mcg daily  - Hyponatremia: NaCl tabs TID  - BPH: Flomax 0.4mg qhs  - Hand rash: aquaphor for now, may need steroid cream   - Cervical pain: lidocaine patch, s/p imaging on 8/9 with no acute findings       Dispo:  - Family meeting w/ housing and  on 8/16  - Refer to PHP?   Pt is 57yo single man with PPHx of dx schoizoaffective d/o, hx of many prior hospitalizations (last at [facility] June 2024), in outpt tx at [facility] AOPD, w/ PMHx HTN, HLD, hypothyroidism, and CVID/ hypogammaglobulinemia (on monthly IVIG - last dose 6/16/24, then 7/24/24), COPD not on home O2, hx of frequent skin infection who presented initially to [facility] for SA by overdose of labetalol, found to have left leg abscess vs cellulitis and was transferred to [facility] on 7/22/24 for IVIG infusions (received 7/24/24), followed by psych CL, then medically cleared and transferred to [facility] for further psychiatric treatment.    Psychosis improving and mood now stable. Denies SIIP.     Plan:  Psych:  - S/p Haldol Dec 200mg IM on 8/7/24, next due 9/4/24; continuing with PO Haldol 5mg qhs for now  - C/w Cogentin 1mg BID for now, goal to taper   - C/w Lexapro 10mg daily     Medical:  - CVID: IVIG infusion q3.5weeks (last 7/24, next scheduled for 8/15)  - Conjunctivitis: c/w Tobramycin drops QID (d/c 8/13)  - Hand rash: aquaphor for now, may need steroid cream  - High risk for diabetes w/ A1C 5.8: d/c finger sticks and ISS, no indication   - HTN: Norvasc 10mg daily, Labetalol 200mg BID  - HLD: Lipitor 40mg qhs  - Hypothyroidism: Synthroid 50mcg daily  - Hyponatremia: NaCl tabs TID  - BPH: Flomax 0.4mg qhs  - Cervical pain: lidocaine patch, s/p imaging on 8/9 with no acute findings       Dispo:  - Family meeting w/ housing and  on 8/16  - Refer to PHP?

## 2024-08-12 NOTE — BH INPATIENT PSYCHIATRY PROGRESS NOTE - NSBHMSEAFFQUAL_PSY_A_CORE
Depressed
Euthymic
Depressed

## 2024-08-13 LAB
GLUCOSE BLDC GLUCOMTR-MCNC: 101 MG/DL — HIGH (ref 70–99)
GLUCOSE BLDC GLUCOMTR-MCNC: 120 MG/DL — HIGH (ref 70–99)

## 2024-08-13 PROCEDURE — 90853 GROUP PSYCHOTHERAPY: CPT

## 2024-08-13 PROCEDURE — 99231 SBSQ HOSP IP/OBS SF/LOW 25: CPT

## 2024-08-13 RX ORDER — LORAZEPAM 4 MG/ML
2 INJECTION INTRAMUSCULAR; INTRAVENOUS ONCE
Refills: 0 | Status: DISCONTINUED | OUTPATIENT
Start: 2024-08-13 | End: 2024-08-20

## 2024-08-13 RX ORDER — LORAZEPAM 4 MG/ML
1 INJECTION INTRAMUSCULAR; INTRAVENOUS EVERY 6 HOURS
Refills: 0 | Status: DISCONTINUED | OUTPATIENT
Start: 2024-08-13 | End: 2024-08-20

## 2024-08-13 RX ADMIN — SODIUM CHLORIDE 1 GRAM(S): 9 INJECTION INTRAMUSCULAR; INTRAVENOUS; SUBCUTANEOUS at 20:07

## 2024-08-13 RX ADMIN — AMLODIPINE BESYLATE 10 MILLIGRAM(S): 10 TABLET ORAL at 09:02

## 2024-08-13 RX ADMIN — Medication 40 MILLIGRAM(S): at 20:08

## 2024-08-13 RX ADMIN — BENZTROPINE MESYLATE 1 MILLIGRAM(S): 2 TABLET ORAL at 09:03

## 2024-08-13 RX ADMIN — Medication 200 MILLIGRAM(S): at 20:08

## 2024-08-13 RX ADMIN — SODIUM CHLORIDE 1 GRAM(S): 9 INJECTION INTRAMUSCULAR; INTRAVENOUS; SUBCUTANEOUS at 13:46

## 2024-08-13 RX ADMIN — Medication 2 DROP(S): at 09:05

## 2024-08-13 RX ADMIN — Medication 5 MILLIGRAM(S): at 20:09

## 2024-08-13 RX ADMIN — ESCITALOPRAM OXALATE 10 MILLIGRAM(S): 10 TABLET ORAL at 09:02

## 2024-08-13 RX ADMIN — BENZTROPINE MESYLATE 1 MILLIGRAM(S): 2 TABLET ORAL at 20:08

## 2024-08-13 RX ADMIN — TAMSULOSIN HYDROCHLORIDE 0.4 MILLIGRAM(S): 0.4 CAPSULE ORAL at 20:07

## 2024-08-13 RX ADMIN — SODIUM CHLORIDE 1 GRAM(S): 9 INJECTION INTRAMUSCULAR; INTRAVENOUS; SUBCUTANEOUS at 09:02

## 2024-08-13 RX ADMIN — Medication 200 MILLIGRAM(S): at 09:02

## 2024-08-13 RX ADMIN — Medication 3 MILLIGRAM(S): at 20:08

## 2024-08-13 RX ADMIN — Medication 50 MICROGRAM(S): at 06:04

## 2024-08-13 NOTE — BH INPATIENT PSYCHIATRY PROGRESS NOTE - NSBHASSESSSUMMFT_PSY_ALL_CORE
Pt is 57yo single man with PPHx of dx schoizoaffective d/o, hx of many prior hospitalizations (last at [facility] June 2024), in outpt tx at [facility] AOPD, w/ PMHx HTN, HLD, hypothyroidism, and CVID/ hypogammaglobulinemia (on monthly IVIG - last dose 6/16/24, then 7/24/24), COPD not on home O2, hx of frequent skin infection who presented initially to [facility] for SA by overdose of labetalol, found to have left leg abscess vs cellulitis and was transferred to [facility] on 7/22/24 for IVIG infusions (received 7/24/24), followed by psych CL, then medically cleared and transferred to [facility] for further psychiatric treatment.    Psychosis has improved and mood now stable. Continues to deny SIIP.     Plan:  Psych:  - S/p Haldol Dec 200mg IM on 8/7/24, next due 9/4/24; continuing with PO Haldol 5mg qhs for now  - C/w Cogentin 1mg BID for now, goal to taper   - C/w Lexapro 10mg daily     Medical:  - CVID: IVIG infusion q3.5weeks (last 7/24, next scheduled for 8/15)  - Conjunctivitis: c/w Tobramycin drops QID, course completed today  - Hand rash: aquaphor for now, may need steroid cream  - HTN: Norvasc 10mg daily, Labetalol 200mg BID  - HLD: Lipitor 40mg qhs  - Hypothyroidism: Synthroid 50mcg daily  - Hyponatremia: NaCl tabs TID  - BPH: Flomax 0.4mg qhs  - Cervical pain: lidocaine patch, s/p imaging on 8/9 with no acute findings       Dispo:  - Family meeting w/ housing and  on 8/16  - Refer to PHP?

## 2024-08-13 NOTE — BH INPATIENT PSYCHIATRY PROGRESS NOTE - NSBHFUPINTERVALHXFT_PSY_A_CORE
No acute events overnight. Pt compliant with meds and slept well. Remains in good spirits and continues to report feeling hopeful and grateful. Denies SIIP. Denies any paranoia or intrusive thoughts. Reports feeling physically well. Has been engaged in unit milieu an attending groups.

## 2024-08-13 NOTE — BH INPATIENT PSYCHIATRY PROGRESS NOTE - CURRENT MEDICATION
MEDICATIONS  (STANDING):  amLODIPine   Tablet 10 milliGRAM(s) Oral daily  atorvastatin 40 milliGRAM(s) Oral at bedtime  benztropine 1 milliGRAM(s) Oral two times a day  escitalopram 10 milliGRAM(s) Oral daily  glucagon  Injectable 1 milliGRAM(s) IntraMuscular once  haloperidol     Tablet 5 milliGRAM(s) Oral at bedtime  labetalol 200 milliGRAM(s) Oral two times a day  levothyroxine 50 MICROGram(s) Oral daily  lidocaine   4% Patch 1 Patch Transdermal daily  sodium chloride 1 Gram(s) Oral three times a day  tamsulosin 0.4 milliGRAM(s) Oral at bedtime    MEDICATIONS  (PRN):  acetaminophen     Tablet .. 650 milliGRAM(s) Oral every 6 hours PRN Temp greater or equal to 38C (100.4F), Mild Pain (1 - 3)  aluminum hydroxide/magnesium hydroxide/simethicone Suspension 30 milliLiter(s) Oral every 6 hours PRN Dyspepsia  AQUAPHOR (petrolatum Ointment) 1 Application(s) Topical two times a day PRN dry skin  chlorproMAZINE    Injectable 50 milliGRAM(s) IntraMuscular once PRN agitation  haloperidol     Tablet 2.5 milliGRAM(s) Oral every 6 hours PRN psychosis  LORazepam     Tablet 1 milliGRAM(s) Oral every 6 hours PRN Anxiety  LORazepam   Injectable 2 milliGRAM(s) IntraMuscular once PRN Agitation  magnesium hydroxide Suspension 30 milliLiter(s) Oral daily PRN Constipation  melatonin. 3 milliGRAM(s) Oral at bedtime PRN Insomnia  traZODone 50 milliGRAM(s) Oral at bedtime PRN insomnia

## 2024-08-13 NOTE — BH INPATIENT PSYCHIATRY PROGRESS NOTE - PRN MEDS
MEDICATIONS  (PRN):  acetaminophen     Tablet .. 650 milliGRAM(s) Oral every 6 hours PRN Temp greater or equal to 38C (100.4F), Mild Pain (1 - 3)  aluminum hydroxide/magnesium hydroxide/simethicone Suspension 30 milliLiter(s) Oral every 6 hours PRN Dyspepsia  AQUAPHOR (petrolatum Ointment) 1 Application(s) Topical two times a day PRN dry skin  chlorproMAZINE    Injectable 50 milliGRAM(s) IntraMuscular once PRN agitation  haloperidol     Tablet 2.5 milliGRAM(s) Oral every 6 hours PRN psychosis  LORazepam     Tablet 1 milliGRAM(s) Oral every 6 hours PRN Anxiety  LORazepam   Injectable 2 milliGRAM(s) IntraMuscular once PRN Agitation  magnesium hydroxide Suspension 30 milliLiter(s) Oral daily PRN Constipation  melatonin. 3 milliGRAM(s) Oral at bedtime PRN Insomnia  traZODone 50 milliGRAM(s) Oral at bedtime PRN insomnia

## 2024-08-14 DIAGNOSIS — E03.9 HYPOTHYROIDISM, UNSPECIFIED: ICD-10-CM

## 2024-08-14 PROCEDURE — 90853 GROUP PSYCHOTHERAPY: CPT

## 2024-08-14 PROCEDURE — 99232 SBSQ HOSP IP/OBS MODERATE 35: CPT

## 2024-08-14 RX ORDER — POLYETHYLENE GLYCOL 3350 17 G/17G
17 POWDER, FOR SOLUTION ORAL DAILY
Refills: 0 | Status: DISCONTINUED | OUTPATIENT
Start: 2024-08-14 | End: 2024-08-28

## 2024-08-14 RX ORDER — METFORMIN HYDROCHLORIDE 850 MG/1
500 TABLET, FILM COATED ORAL
Refills: 0 | Status: DISCONTINUED | OUTPATIENT
Start: 2024-08-14 | End: 2024-08-28

## 2024-08-14 RX ORDER — LIDOCAINE/BENZALKONIUM/ALCOHOL
1 SOLUTION, NON-ORAL TOPICAL DAILY
Refills: 0 | Status: DISCONTINUED | OUTPATIENT
Start: 2024-08-14 | End: 2024-08-28

## 2024-08-14 RX ADMIN — TAMSULOSIN HYDROCHLORIDE 0.4 MILLIGRAM(S): 0.4 CAPSULE ORAL at 20:31

## 2024-08-14 RX ADMIN — BENZTROPINE MESYLATE 1 MILLIGRAM(S): 2 TABLET ORAL at 20:32

## 2024-08-14 RX ADMIN — Medication 40 MILLIGRAM(S): at 20:31

## 2024-08-14 RX ADMIN — SODIUM CHLORIDE 1 GRAM(S): 9 INJECTION INTRAMUSCULAR; INTRAVENOUS; SUBCUTANEOUS at 20:33

## 2024-08-14 RX ADMIN — METFORMIN HYDROCHLORIDE 500 MILLIGRAM(S): 850 TABLET, FILM COATED ORAL at 20:32

## 2024-08-14 RX ADMIN — Medication 3 MILLIGRAM(S): at 20:31

## 2024-08-14 RX ADMIN — SODIUM CHLORIDE 1 GRAM(S): 9 INJECTION INTRAMUSCULAR; INTRAVENOUS; SUBCUTANEOUS at 08:33

## 2024-08-14 RX ADMIN — Medication 200 MILLIGRAM(S): at 20:32

## 2024-08-14 RX ADMIN — BENZTROPINE MESYLATE 1 MILLIGRAM(S): 2 TABLET ORAL at 08:33

## 2024-08-14 RX ADMIN — SODIUM CHLORIDE 1 GRAM(S): 9 INJECTION INTRAMUSCULAR; INTRAVENOUS; SUBCUTANEOUS at 12:49

## 2024-08-14 RX ADMIN — Medication 5 MILLIGRAM(S): at 20:31

## 2024-08-14 RX ADMIN — ESCITALOPRAM OXALATE 10 MILLIGRAM(S): 10 TABLET ORAL at 08:33

## 2024-08-14 RX ADMIN — Medication 50 MICROGRAM(S): at 06:20

## 2024-08-14 NOTE — BH INPATIENT PSYCHIATRY PROGRESS NOTE - PRN MEDS

## 2024-08-14 NOTE — BH INPATIENT PSYCHIATRY PROGRESS NOTE - NSBHFUPINTERVALHXFT_PSY_A_CORE
No acute events overnight. Pt compliant with meds and sleeping well. He continues to report improved mood and states "I'm still feeling grateful and positive." Denies SIIP. Feeling hopeful about the future. Has been engaged in unit milieu, attending groups and social with peers. Denies feeling paranoid and denies concern that anyone is speaking about him or wishes to harm him. Denies prior intrusive thoughts regarding sexual abuse and molestation. Denies AH and VH. Denies thought-broadcasting and insertion. Denies mind/body control by an outside entity. Reports feeling physically well.

## 2024-08-14 NOTE — BH INPATIENT PSYCHIATRY PROGRESS NOTE - NSBHASSESSSUMMFT_PSY_ALL_CORE
Pt is 57yo single man with PPHx of dx schoizoaffective d/o, hx of many prior hospitalizations (last at [facility] June 2024), in outpt tx at [facility] AOPD, w/ PMHx HTN, HLD, hypothyroidism, and CVID/ hypogammaglobulinemia (on monthly IVIG - last doses 6/16/24 and 7/24/24), COPD not on home O2, hx of frequent skin infection who presented initially to [facility] for SA by overdose of labetalol related to paranoia and fear, found to have left leg abscess vs cellulitis and was transferred to [facility] on 7/22/24 for IVIG infusions (received 7/24/24), followed by psych CL, then medically cleared and transferred to [facility] on 7/26/24 for further psychiatric treatment.    Psychosis remains much improved (pt consistently denying paranoia and AH), as does pt's mood (which is stable and euthymic). Continues to deny SIIP.     Plan:  Psych:  - S/p Haldol Dec 200mg IM on 8/7/24, next due 9/4/24; continuing with PO Haldol 5mg qhs for now, will plan to d/c  - C/w Cogentin 1mg BID for now, goal to taper   - C/w Lexapro 10mg daily     Medical:  - CVID: IVIG infusion q3.5weeks (last 7/24, next scheduled for 8/15)  - Hand rash: aquaphor for now, may need steroid cream  - HTN: Norvasc 10mg daily, Labetalol 200mg BID  - HLD: Lipitor 40mg qhs  - Hypothyroidism: Synthroid 50mcg daily  - Hyponatremia: NaCl tabs TID  - BPH: Flomax 0.4mg qhs  - Prediabetes (with A1C 5.8): Metformin 500mg BID, have discontinued finger sticks and ISS   - Cervical pain: resolved, lidocaine patch prn, s/p imaging on 8/9 with no acute findings    - Conjunctivitis: resolved, s/p course of Tobramycin drops      Dispo:  - Family meeting w/ housing and  on 8/16  - Refer to PHP vs PACE vs PROS   Pt is 57yo single man with PPHx of dx schoizoaffective d/o, hx of many prior hospitalizations (last at [facility] June 2024), in outpt tx at [facility] AOPD, w/ PMHx HTN, HLD, hypothyroidism, and CVID/ hypogammaglobulinemia (on monthly IVIG - last doses 6/16/24 and 7/24/24), COPD not on home O2, hx of frequent skin infection who presented initially to [facility] for SA by overdose of labetalol related to paranoia and fear, found to have left leg abscess vs cellulitis and was transferred to [facility] on 7/22/24 for IVIG infusions (received 7/24/24), followed by psych CL, then medically cleared and transferred to [facility] on 7/26/24 for further psychiatric treatment.    Psychosis remains much improved (pt consistently denying paranoia and AH), as does pt's mood (which is stable and euthymic). Continues to deny SIIP.     Plan:  Psych:  - S/p Haldol Dec 200mg IM on 8/7/24, next due 9/4/24; continuing with PO Haldol 5mg qhs for now, will plan to d/c  - C/w Cogentin 1mg BID for now, goal to taper   - C/w Lexapro 10mg daily     Medical:  - CVID: IVIG infusion q3.5weeks (last 7/24, next scheduled for 8/15)  - Hand rash: aquaphor for now, may need steroid cream  - HTN: Norvasc 10mg daily, Labetalol 200mg BID  - HLD: Lipitor 40mg qhs  - Hypothyroidism: Synthroid 50mcg daily  - Hyponatremia: NaCl tabs TID, may be able to discontinue?, will get BMP   - BPH: Flomax 0.4mg qhs  - Prediabetes (with A1C 5.8): Metformin 500mg BID, have discontinued finger sticks and ISS  - Left thigh wound - s/p treatment at [facility] with antibiotics (seen by ID and wound care), healing    - Cervical pain: resolved, lidocaine patch prn, s/p imaging on 8/9 with no acute findings    - Conjunctivitis: resolved, s/p course of Tobramycin drops      Dispo:  - Family meeting w/ housing and  on 8/16  - Refer to PHP vs PACE vs PROS

## 2024-08-14 NOTE — BH INPATIENT PSYCHIATRY PROGRESS NOTE - CURRENT MEDICATION
MEDICATIONS  (STANDING):  amLODIPine   Tablet 10 milliGRAM(s) Oral daily  atorvastatin 40 milliGRAM(s) Oral at bedtime  benztropine 1 milliGRAM(s) Oral two times a day  escitalopram 10 milliGRAM(s) Oral daily  glucagon  Injectable 1 milliGRAM(s) IntraMuscular once  haloperidol     Tablet 5 milliGRAM(s) Oral at bedtime  labetalol 200 milliGRAM(s) Oral two times a day  levothyroxine 50 MICROGram(s) Oral daily  lidocaine   4% Patch 1 Patch Transdermal daily  metFORMIN 500 milliGRAM(s) Oral two times a day  sodium chloride 1 Gram(s) Oral three times a day  tamsulosin 0.4 milliGRAM(s) Oral at bedtime    MEDICATIONS  (PRN):  acetaminophen     Tablet .. 650 milliGRAM(s) Oral every 6 hours PRN Temp greater or equal to 38C (100.4F), Mild Pain (1 - 3)  aluminum hydroxide/magnesium hydroxide/simethicone Suspension 30 milliLiter(s) Oral every 6 hours PRN Dyspepsia  AQUAPHOR (petrolatum Ointment) 1 Application(s) Topical two times a day PRN dry skin  chlorproMAZINE    Injectable 50 milliGRAM(s) IntraMuscular once PRN agitation  haloperidol     Tablet 2.5 milliGRAM(s) Oral every 6 hours PRN psychosis  LORazepam     Tablet 1 milliGRAM(s) Oral every 6 hours PRN Anxiety  LORazepam   Injectable 2 milliGRAM(s) IntraMuscular once PRN Agitation  magnesium hydroxide Suspension 30 milliLiter(s) Oral daily PRN Constipation  melatonin. 3 milliGRAM(s) Oral at bedtime PRN Insomnia  traZODone 50 milliGRAM(s) Oral at bedtime PRN insomnia   MEDICATIONS  (STANDING):  amLODIPine   Tablet 10 milliGRAM(s) Oral daily  atorvastatin 40 milliGRAM(s) Oral at bedtime  benztropine 1 milliGRAM(s) Oral two times a day  escitalopram 10 milliGRAM(s) Oral daily  glucagon  Injectable 1 milliGRAM(s) IntraMuscular once  haloperidol     Tablet 5 milliGRAM(s) Oral at bedtime  labetalol 200 milliGRAM(s) Oral two times a day  levothyroxine 50 MICROGram(s) Oral daily  metFORMIN 500 milliGRAM(s) Oral two times a day  sodium chloride 1 Gram(s) Oral three times a day  tamsulosin 0.4 milliGRAM(s) Oral at bedtime    MEDICATIONS  (PRN):  acetaminophen     Tablet .. 650 milliGRAM(s) Oral every 6 hours PRN Temp greater or equal to 38C (100.4F), Mild Pain (1 - 3)  aluminum hydroxide/magnesium hydroxide/simethicone Suspension 30 milliLiter(s) Oral every 6 hours PRN Dyspepsia  AQUAPHOR (petrolatum Ointment) 1 Application(s) Topical two times a day PRN dry skin  chlorproMAZINE    Injectable 50 milliGRAM(s) IntraMuscular once PRN agitation  haloperidol     Tablet 2.5 milliGRAM(s) Oral every 6 hours PRN psychosis  lidocaine   4% Patch 1 Patch Transdermal daily PRN cervical pain  LORazepam     Tablet 1 milliGRAM(s) Oral every 6 hours PRN Anxiety  LORazepam   Injectable 2 milliGRAM(s) IntraMuscular once PRN Agitation  magnesium hydroxide Suspension 30 milliLiter(s) Oral daily PRN Constipation  melatonin. 3 milliGRAM(s) Oral at bedtime PRN Insomnia  polyethylene glycol 3350 17 Gram(s) Oral daily PRN constipation  traZODone 50 milliGRAM(s) Oral at bedtime PRN insomnia

## 2024-08-15 ENCOUNTER — APPOINTMENT (OUTPATIENT)
Dept: RHEUMATOLOGY | Facility: CLINIC | Age: 59
End: 2024-08-15
Payer: MEDICARE

## 2024-08-15 VITALS
OXYGEN SATURATION: 97 % | DIASTOLIC BLOOD PRESSURE: 81 MMHG | HEART RATE: 62 BPM | SYSTOLIC BLOOD PRESSURE: 133 MMHG | RESPIRATION RATE: 16 BRPM

## 2024-08-15 VITALS
RESPIRATION RATE: 16 BRPM | HEART RATE: 57 BPM | DIASTOLIC BLOOD PRESSURE: 83 MMHG | OXYGEN SATURATION: 97 % | SYSTOLIC BLOOD PRESSURE: 137 MMHG

## 2024-08-15 PROCEDURE — 96365 THER/PROPH/DIAG IV INF INIT: CPT

## 2024-08-15 PROCEDURE — 96366 THER/PROPH/DIAG IV INF ADDON: CPT

## 2024-08-15 PROCEDURE — 99231 SBSQ HOSP IP/OBS SF/LOW 25: CPT

## 2024-08-15 RX ORDER — IMMUNE GLOBULIN INTRAVENOUS (HUMAN) 10 G
10 KIT INTRAVENOUS
Qty: 0 | Refills: 0 | Status: COMPLETED | OUTPATIENT
Start: 2024-08-15

## 2024-08-15 RX ORDER — ACETAMINOPHEN 325 MG/1
325 TABLET ORAL
Qty: 0 | Refills: 0 | Status: COMPLETED
Start: 2024-05-23

## 2024-08-15 RX ORDER — DIPHENHYDRAMINE HCL 25 MG/1
25 TABLET ORAL
Qty: 0 | Refills: 0 | Status: COMPLETED
Start: 2024-05-23

## 2024-08-15 RX ADMIN — Medication 50 MICROGRAM(S): at 05:49

## 2024-08-15 RX ADMIN — METFORMIN HYDROCHLORIDE 500 MILLIGRAM(S): 850 TABLET, FILM COATED ORAL at 20:19

## 2024-08-15 RX ADMIN — Medication 3 MILLIGRAM(S): at 20:20

## 2024-08-15 RX ADMIN — BENZTROPINE MESYLATE 1 MILLIGRAM(S): 2 TABLET ORAL at 20:20

## 2024-08-15 RX ADMIN — IMMUNE GLOBULIN INTRAVENOUS (HUMAN) 0 GM: KIT at 00:00

## 2024-08-15 RX ADMIN — Medication 5 MILLIGRAM(S): at 20:21

## 2024-08-15 RX ADMIN — Medication 200 MILLIGRAM(S): at 20:19

## 2024-08-15 RX ADMIN — Medication 40 MILLIGRAM(S): at 20:20

## 2024-08-15 RX ADMIN — TAMSULOSIN HYDROCHLORIDE 0.4 MILLIGRAM(S): 0.4 CAPSULE ORAL at 20:20

## 2024-08-15 RX ADMIN — SODIUM CHLORIDE 1 GRAM(S): 9 INJECTION INTRAMUSCULAR; INTRAVENOUS; SUBCUTANEOUS at 20:21

## 2024-08-15 NOTE — HISTORY OF PRESENT ILLNESS
[N/A] : N/A [Denies] : Denies [No] : No [Yes] : Yes [Declined] : Declined [Right upper extremity] : Right upper extremity [24g] : 24g [Start Time: ___] : Medication Start Time: [unfilled] [End Time: ___] : Medication End Time: [unfilled] [Medication Name: ___] : Medication Name: [unfilled] [Total Amount Administered: ___] : Total Amount Administered: [unfilled] [IV discontinued. Intact. No signs or symptoms of IV complications noted. Time: ___] : IV discontinued. Intact. No signs or symptoms of IV complications noted. Time: [unfilled] [Patient  instructed to seek medical attention with signs and symptoms of adverse effects] : Patient  instructed to seek medical attention with signs and symptoms of adverse effects [Patient left unit in no acute distress] : Patient left unit in no acute distress [Medications administered as ordered and tolerated well.] : Medications administered as ordered and tolerated well. [de-identified] : right hand dorsal  [de-identified] : Patient presents for Gammagard S/D infusion today. Patient arrived with aide today. Patient reports wound on left leg is almost healed. Otherwise, patient denies of having any significant changes and denies of having any symptoms or complaints. PPatient pre-medicated as per order. Infusion titrated as per protocol, and tolerated well.

## 2024-08-15 NOTE — BH INPATIENT PSYCHIATRY PROGRESS NOTE - NSBHFUPINTERVALHXFT_PSY_A_CORE
No acute events overnight. Pt compliant with meds and slept well. Remains in good spirits. Away from the unit much of the day today for his IVIG infusion. Spoke with pt's two sisters today to provide treatment updates and confirmed family meeting scheduled for tomorrow.

## 2024-08-15 NOTE — BH INPATIENT PSYCHIATRY PROGRESS NOTE - NSBHMETABOLIC_PSY_ALL_CORE_FT
BMI: BMI (kg/m2): 31.8 (08-08-24 @ 15:27)  HbA1c: A1C with Estimated Average Glucose Result: 5.8 % (07-24-24 @ 07:48)    Glucose: POCT Blood Glucose.: 120 mg/dL (08-13-24 @ 11:33)    BP: --Vital Signs Last 24 Hrs  T(C): 36.4 (08-15-24 @ 07:55), Max: 36.4 (08-15-24 @ 07:55)  T(F): 97.6 (08-15-24 @ 07:55), Max: 97.6 (08-15-24 @ 07:55)  HR: --  BP: --  BP(mean): --  RR: --  SpO2: --    Orthostatic VS  08-15-24 @ 07:55  Lying BP: --/-- HR: --  Sitting BP: 138/68 HR: 54  Standing BP: 125/63 HR: 66  Site: --  Mode: --  Orthostatic VS  08-14-24 @ 19:29  Lying BP: --/-- HR: --  Sitting BP: 149/79 HR: 61  Standing BP: 153/75 HR: 70  Site: --  Mode: --  Orthostatic VS  08-14-24 @ 08:22  Lying BP: --/-- HR: --  Sitting BP: 101/63 HR: 62  Standing BP: 96/64 HR: 69  Site: upper left arm  Mode: electronic  Orthostatic VS  08-13-24 @ 18:48  Lying BP: --/-- HR: --  Sitting BP: 134/72 HR: 60  Standing BP: 123/69 HR: 60  Site: --  Mode: --    Lipid Panel: Date/Time: 07-24-24 @ 07:48  Cholesterol, Serum: 81  LDL Cholesterol Calculated: 34  HDL Cholesterol, Serum: 30  Total Cholesterol/HDL Ration Measurement: --  Triglycerides, Serum: 84

## 2024-08-15 NOTE — BH INPATIENT PSYCHIATRY PROGRESS NOTE - PRN MEDS
MEDICATIONS  (PRN):  acetaminophen     Tablet .. 650 milliGRAM(s) Oral every 6 hours PRN Temp greater or equal to 38C (100.4F), Mild Pain (1 - 3)  aluminum hydroxide/magnesium hydroxide/simethicone Suspension 30 milliLiter(s) Oral every 6 hours PRN Dyspepsia  AQUAPHOR (petrolatum Ointment) 1 Application(s) Topical two times a day PRN dry skin  chlorproMAZINE    Injectable 50 milliGRAM(s) IntraMuscular once PRN agitation  haloperidol     Tablet 2.5 milliGRAM(s) Oral every 6 hours PRN psychosis  lidocaine   4% Patch 1 Patch Transdermal daily PRN cervical pain  LORazepam     Tablet 1 milliGRAM(s) Oral every 6 hours PRN Anxiety  LORazepam   Injectable 2 milliGRAM(s) IntraMuscular once PRN Agitation  magnesium hydroxide Suspension 30 milliLiter(s) Oral daily PRN Constipation  melatonin. 3 milliGRAM(s) Oral at bedtime PRN Insomnia  polyethylene glycol 3350 17 Gram(s) Oral daily PRN constipation  traZODone 50 milliGRAM(s) Oral at bedtime PRN insomnia

## 2024-08-15 NOTE — BH INPATIENT PSYCHIATRY PROGRESS NOTE - NSBHASSESSSUMMFT_PSY_ALL_CORE
Pt is 57yo single man with PPHx of dx schoizoaffective d/o, hx of many prior hospitalizations (last at [facility] June 2024), in outpt tx at [facility] AOPD, w/ PMHx HTN, HLD, hypothyroidism, and CVID/ hypogammaglobulinemia (on monthly IVIG - last doses 6/16/24 and 7/24/24), COPD not on home O2, hx of frequent skin infection who presented initially to [facility] for SA by overdose of labetalol related to paranoia and fear, found to have left leg abscess vs cellulitis and was transferred to [facility] on 7/22/24 for IVIG infusions (received 7/24/24), followed by psych CL, then medically cleared and transferred to [facility] on 7/26/24 for further psychiatric treatment.    Psychosis remains much improved (pt consistently denying paranoia and AH), as does pt's mood (which is stable and euthymic). Continues to deny SIIP.     Plan:  Psych:  - S/p Haldol Dec 200mg IM on 8/7/24, next due 8/28/24 (switched from [id number] to [id number]); continuing with PO Haldol 5mg qhs for now, will plan to d/c  - C/w Cogentin 1mg BID for now, goal to taper   - C/w Lexapro 10mg daily     Medical:  - CVID: IVIG infusion q3.5weeks (last 7/24, next scheduled for today 8/15)  - Hand rash: aquaphor for now, may need steroid cream  - HTN: Norvasc 10mg daily, Labetalol 200mg BID,   - HLD: Lipitor 40mg qhs  - Hypothyroidism: Synthroid 50mcg daily  - Hyponatremia: NaCl tabs TID, may be able to discontinue?, will get BMP   - BPH: Flomax 0.4mg qhs  - Prediabetes (with A1C 5.8): Metformin 500mg BID, have discontinued finger sticks and ISS  - Left thigh wound - s/p treatment at [facility] with antibiotics (seen by ID and wound care), healing    - Cervical pain: resolved, lidocaine patch prn, s/p imaging on 8/9 with no acute findings    - Conjunctivitis: resolved, s/p course of Tobramycin drops      Dispo:  - Family meeting w/ housing and  on 8/16  - Refer to PHP vs PROS

## 2024-08-15 NOTE — BH INPATIENT PSYCHIATRY PROGRESS NOTE - CURRENT MEDICATION
MEDICATIONS  (STANDING):  amLODIPine   Tablet 10 milliGRAM(s) Oral daily  atorvastatin 40 milliGRAM(s) Oral at bedtime  benztropine 1 milliGRAM(s) Oral two times a day  escitalopram 10 milliGRAM(s) Oral daily  glucagon  Injectable 1 milliGRAM(s) IntraMuscular once  haloperidol     Tablet 5 milliGRAM(s) Oral at bedtime  labetalol 200 milliGRAM(s) Oral two times a day  levothyroxine 50 MICROGram(s) Oral daily  metFORMIN 500 milliGRAM(s) Oral two times a day  sodium chloride 1 Gram(s) Oral three times a day  tamsulosin 0.4 milliGRAM(s) Oral at bedtime    MEDICATIONS  (PRN):  acetaminophen     Tablet .. 650 milliGRAM(s) Oral every 6 hours PRN Temp greater or equal to 38C (100.4F), Mild Pain (1 - 3)  aluminum hydroxide/magnesium hydroxide/simethicone Suspension 30 milliLiter(s) Oral every 6 hours PRN Dyspepsia  AQUAPHOR (petrolatum Ointment) 1 Application(s) Topical two times a day PRN dry skin  chlorproMAZINE    Injectable 50 milliGRAM(s) IntraMuscular once PRN agitation  haloperidol     Tablet 2.5 milliGRAM(s) Oral every 6 hours PRN psychosis  lidocaine   4% Patch 1 Patch Transdermal daily PRN cervical pain  LORazepam     Tablet 1 milliGRAM(s) Oral every 6 hours PRN Anxiety  LORazepam   Injectable 2 milliGRAM(s) IntraMuscular once PRN Agitation  magnesium hydroxide Suspension 30 milliLiter(s) Oral daily PRN Constipation  melatonin. 3 milliGRAM(s) Oral at bedtime PRN Insomnia  polyethylene glycol 3350 17 Gram(s) Oral daily PRN constipation  traZODone 50 milliGRAM(s) Oral at bedtime PRN insomnia

## 2024-08-16 LAB
ANION GAP SERPL CALC-SCNC: 12 MMOL/L — SIGNIFICANT CHANGE UP (ref 7–14)
BASOPHILS # BLD AUTO: 0.04 K/UL — SIGNIFICANT CHANGE UP (ref 0–0.2)
BASOPHILS NFR BLD AUTO: 0.9 % — SIGNIFICANT CHANGE UP (ref 0–2)
BUN SERPL-MCNC: 16 MG/DL — SIGNIFICANT CHANGE UP (ref 7–23)
CALCIUM SERPL-MCNC: 9.4 MG/DL — SIGNIFICANT CHANGE UP (ref 8.4–10.5)
CHLORIDE SERPL-SCNC: 98 MMOL/L — SIGNIFICANT CHANGE UP (ref 98–107)
CO2 SERPL-SCNC: 24 MMOL/L — SIGNIFICANT CHANGE UP (ref 22–31)
CREAT SERPL-MCNC: 1.17 MG/DL — SIGNIFICANT CHANGE UP (ref 0.5–1.3)
EGFR: 73 ML/MIN/1.73M2 — SIGNIFICANT CHANGE UP
EOSINOPHIL # BLD AUTO: 0.28 K/UL — SIGNIFICANT CHANGE UP (ref 0–0.5)
EOSINOPHIL NFR BLD AUTO: 6 % — SIGNIFICANT CHANGE UP (ref 0–6)
GLUCOSE SERPL-MCNC: 118 MG/DL — HIGH (ref 70–99)
HCT VFR BLD CALC: 35.1 % — LOW (ref 39–50)
HGB BLD-MCNC: 11.8 G/DL — LOW (ref 13–17)
IANC: 3.1 K/UL — SIGNIFICANT CHANGE UP (ref 1.8–7.4)
IMM GRANULOCYTES NFR BLD AUTO: 0.2 % — SIGNIFICANT CHANGE UP (ref 0–0.9)
LYMPHOCYTES # BLD AUTO: 0.76 K/UL — LOW (ref 1–3.3)
LYMPHOCYTES # BLD AUTO: 16.2 % — SIGNIFICANT CHANGE UP (ref 13–44)
MCHC RBC-ENTMCNC: 28.2 PG — SIGNIFICANT CHANGE UP (ref 27–34)
MCHC RBC-ENTMCNC: 33.6 GM/DL — SIGNIFICANT CHANGE UP (ref 32–36)
MCV RBC AUTO: 83.8 FL — SIGNIFICANT CHANGE UP (ref 80–100)
MONOCYTES # BLD AUTO: 0.5 K/UL — SIGNIFICANT CHANGE UP (ref 0–0.9)
MONOCYTES NFR BLD AUTO: 10.7 % — SIGNIFICANT CHANGE UP (ref 2–14)
NEUTROPHILS # BLD AUTO: 3.1 K/UL — SIGNIFICANT CHANGE UP (ref 1.8–7.4)
NEUTROPHILS NFR BLD AUTO: 66 % — SIGNIFICANT CHANGE UP (ref 43–77)
NRBC # BLD: 0 /100 WBCS — SIGNIFICANT CHANGE UP (ref 0–0)
NRBC # FLD: 0 K/UL — SIGNIFICANT CHANGE UP (ref 0–0)
PLATELET # BLD AUTO: 153 K/UL — SIGNIFICANT CHANGE UP (ref 150–400)
POTASSIUM SERPL-MCNC: 4.5 MMOL/L — SIGNIFICANT CHANGE UP (ref 3.5–5.3)
POTASSIUM SERPL-SCNC: 4.5 MMOL/L — SIGNIFICANT CHANGE UP (ref 3.5–5.3)
RBC # BLD: 4.19 M/UL — LOW (ref 4.2–5.8)
RBC # FLD: 14.5 % — SIGNIFICANT CHANGE UP (ref 10.3–14.5)
SODIUM SERPL-SCNC: 134 MMOL/L — LOW (ref 135–145)
WBC # BLD: 4.69 K/UL — SIGNIFICANT CHANGE UP (ref 3.8–10.5)
WBC # FLD AUTO: 4.69 K/UL — SIGNIFICANT CHANGE UP (ref 3.8–10.5)

## 2024-08-16 PROCEDURE — 99233 SBSQ HOSP IP/OBS HIGH 50: CPT

## 2024-08-16 RX ORDER — BENZTROPINE MESYLATE 2 MG/1
1 TABLET ORAL AT BEDTIME
Refills: 0 | Status: DISCONTINUED | OUTPATIENT
Start: 2024-08-16 | End: 2024-08-28

## 2024-08-16 RX ORDER — HYDROCORTISONE 1 %
1 OINTMENT (GRAM) TOPICAL
Refills: 0 | Status: DISCONTINUED | OUTPATIENT
Start: 2024-08-16 | End: 2024-08-20

## 2024-08-16 RX ADMIN — BENZTROPINE MESYLATE 1 MILLIGRAM(S): 2 TABLET ORAL at 20:30

## 2024-08-16 RX ADMIN — ESCITALOPRAM OXALATE 10 MILLIGRAM(S): 10 TABLET ORAL at 08:10

## 2024-08-16 RX ADMIN — Medication 200 MILLIGRAM(S): at 20:31

## 2024-08-16 RX ADMIN — METFORMIN HYDROCHLORIDE 500 MILLIGRAM(S): 850 TABLET, FILM COATED ORAL at 20:31

## 2024-08-16 RX ADMIN — SODIUM CHLORIDE 1 GRAM(S): 9 INJECTION INTRAMUSCULAR; INTRAVENOUS; SUBCUTANEOUS at 08:10

## 2024-08-16 RX ADMIN — Medication 1 APPLICATION(S): at 20:33

## 2024-08-16 RX ADMIN — Medication 40 MILLIGRAM(S): at 20:31

## 2024-08-16 RX ADMIN — Medication 200 MILLIGRAM(S): at 08:10

## 2024-08-16 RX ADMIN — SODIUM CHLORIDE 1 GRAM(S): 9 INJECTION INTRAMUSCULAR; INTRAVENOUS; SUBCUTANEOUS at 20:31

## 2024-08-16 RX ADMIN — METFORMIN HYDROCHLORIDE 500 MILLIGRAM(S): 850 TABLET, FILM COATED ORAL at 08:10

## 2024-08-16 RX ADMIN — SODIUM CHLORIDE 1 GRAM(S): 9 INJECTION INTRAMUSCULAR; INTRAVENOUS; SUBCUTANEOUS at 12:32

## 2024-08-16 RX ADMIN — AMLODIPINE BESYLATE 10 MILLIGRAM(S): 10 TABLET ORAL at 08:11

## 2024-08-16 RX ADMIN — TAMSULOSIN HYDROCHLORIDE 0.4 MILLIGRAM(S): 0.4 CAPSULE ORAL at 20:31

## 2024-08-16 RX ADMIN — BENZTROPINE MESYLATE 1 MILLIGRAM(S): 2 TABLET ORAL at 08:09

## 2024-08-16 RX ADMIN — Medication 50 MICROGRAM(S): at 06:30

## 2024-08-16 RX ADMIN — Medication 3 MILLIGRAM(S): at 22:05

## 2024-08-16 NOTE — BH INPATIENT PSYCHIATRY PROGRESS NOTE - NSBHASSESSSUMMFT_PSY_ALL_CORE
Pt is 57yo single man with PPHx of dx schoizoaffective d/o, hx of many prior hospitalizations (last at [facility] June 2024), in outpt tx at [facility] AOPD, w/ PMHx HTN, HLD, hypothyroidism, and CVID/ hypogammaglobulinemia (on monthly IVIG - last doses 6/16/24 and 7/24/24), COPD not on home O2, hx of frequent skin infection who presented initially to [facility] for SA by overdose of labetalol related to paranoia and fear, found to have left leg abscess vs cellulitis and was transferred to [facility] on 7/22/24 for IVIG infusions (received 7/24/24), followed by psych CL, then medically cleared and transferred to [facility] on 7/26/24 for further psychiatric treatment.    Psychosis remains much improved (pt consistently denying paranoia and AH), as does pt's mood (which is stable and euthymic). Continues to deny SIIP. Held family meeting today to discuss outpatient treatment plan.     Plan:  Psych:  - S/p Haldol Dec 200mg IM on 8/7/24, next due 8/28/24 (switched from [id number] to [id number]); will discontinue PO Haldol 5mg qhs  - Taper Cogentin to 1mg qhs, may be able to taper further   - C/w Lexapro 10mg daily     Medical:  - CVID: IVIG infusion q3.5weeks (last 8/15)  - Hand rash: start cortisone  BID  - HTN: Norvasc 10mg daily, Labetalol 200mg BID,   - HLD: Lipitor 40mg qhs  - Hypothyroidism: Synthroid 50mcg daily  - Hyponatremia: NaCl tabs TID, BMP on 8/16 within normal limits  - BPH: Flomax 0.4mg qhs  - Prediabetes (with A1C 5.8): Metformin 500mg BID  - Left thigh wound - s/p treatment at [facility] with antibiotics (seen by ID and wound care), healing with continued wound care  - Cervical pain: resolved, lidocaine patch prn, s/p imaging on 8/9 with no acute findings    - Conjunctivitis: resolved, s/p course of Tobramycin drops      Dispo:  - Family meeting w/ sisters, housing, and  today 8/16  - Refer to PACE program

## 2024-08-16 NOTE — BH INPATIENT PSYCHIATRY PROGRESS NOTE - NSBHFUPINTERVALHXFT_PSY_A_CORE
No acute events overnight. Pt compliant with meds and slept well. Had IVIG infusion yesterday. Continues to report good mood and denies SIIP. Denies AH and intrusive thoughts, denies feeling paranoid or unsafe. He is tending to ADLs appropriately. Held family meeting today with pt's two sisters, housing, and  to discuss current treatment plan and plan for continued outpatient treatment.

## 2024-08-17 RX ADMIN — Medication 50 MICROGRAM(S): at 05:36

## 2024-08-17 RX ADMIN — TAMSULOSIN HYDROCHLORIDE 0.4 MILLIGRAM(S): 0.4 CAPSULE ORAL at 20:38

## 2024-08-17 RX ADMIN — SODIUM CHLORIDE 1 GRAM(S): 9 INJECTION INTRAMUSCULAR; INTRAVENOUS; SUBCUTANEOUS at 08:58

## 2024-08-17 RX ADMIN — AMLODIPINE BESYLATE 10 MILLIGRAM(S): 10 TABLET ORAL at 08:59

## 2024-08-17 RX ADMIN — Medication 200 MILLIGRAM(S): at 20:37

## 2024-08-17 RX ADMIN — METFORMIN HYDROCHLORIDE 500 MILLIGRAM(S): 850 TABLET, FILM COATED ORAL at 08:59

## 2024-08-17 RX ADMIN — BENZTROPINE MESYLATE 1 MILLIGRAM(S): 2 TABLET ORAL at 20:37

## 2024-08-17 RX ADMIN — Medication 1 APPLICATION(S): at 20:49

## 2024-08-17 RX ADMIN — Medication 3 MILLIGRAM(S): at 20:38

## 2024-08-17 RX ADMIN — SODIUM CHLORIDE 1 GRAM(S): 9 INJECTION INTRAMUSCULAR; INTRAVENOUS; SUBCUTANEOUS at 20:37

## 2024-08-17 RX ADMIN — Medication 200 MILLIGRAM(S): at 08:59

## 2024-08-17 RX ADMIN — ESCITALOPRAM OXALATE 10 MILLIGRAM(S): 10 TABLET ORAL at 08:59

## 2024-08-17 RX ADMIN — SODIUM CHLORIDE 1 GRAM(S): 9 INJECTION INTRAMUSCULAR; INTRAVENOUS; SUBCUTANEOUS at 12:48

## 2024-08-17 RX ADMIN — Medication 1 APPLICATION(S): at 10:09

## 2024-08-17 RX ADMIN — METFORMIN HYDROCHLORIDE 500 MILLIGRAM(S): 850 TABLET, FILM COATED ORAL at 20:37

## 2024-08-17 RX ADMIN — Medication 40 MILLIGRAM(S): at 20:38

## 2024-08-18 RX ADMIN — SODIUM CHLORIDE 1 GRAM(S): 9 INJECTION INTRAMUSCULAR; INTRAVENOUS; SUBCUTANEOUS at 08:40

## 2024-08-18 RX ADMIN — Medication 3 MILLIGRAM(S): at 20:13

## 2024-08-18 RX ADMIN — METFORMIN HYDROCHLORIDE 500 MILLIGRAM(S): 850 TABLET, FILM COATED ORAL at 08:41

## 2024-08-18 RX ADMIN — Medication 40 MILLIGRAM(S): at 20:12

## 2024-08-18 RX ADMIN — Medication 200 MILLIGRAM(S): at 08:40

## 2024-08-18 RX ADMIN — SODIUM CHLORIDE 1 GRAM(S): 9 INJECTION INTRAMUSCULAR; INTRAVENOUS; SUBCUTANEOUS at 20:14

## 2024-08-18 RX ADMIN — Medication 50 MICROGRAM(S): at 07:01

## 2024-08-18 RX ADMIN — SODIUM CHLORIDE 1 GRAM(S): 9 INJECTION INTRAMUSCULAR; INTRAVENOUS; SUBCUTANEOUS at 13:22

## 2024-08-18 RX ADMIN — TAMSULOSIN HYDROCHLORIDE 0.4 MILLIGRAM(S): 0.4 CAPSULE ORAL at 20:13

## 2024-08-18 RX ADMIN — Medication 200 MILLIGRAM(S): at 20:14

## 2024-08-18 RX ADMIN — BENZTROPINE MESYLATE 1 MILLIGRAM(S): 2 TABLET ORAL at 20:13

## 2024-08-18 RX ADMIN — AMLODIPINE BESYLATE 10 MILLIGRAM(S): 10 TABLET ORAL at 08:40

## 2024-08-18 RX ADMIN — METFORMIN HYDROCHLORIDE 500 MILLIGRAM(S): 850 TABLET, FILM COATED ORAL at 20:13

## 2024-08-18 RX ADMIN — ESCITALOPRAM OXALATE 10 MILLIGRAM(S): 10 TABLET ORAL at 08:40

## 2024-08-19 PROCEDURE — 99232 SBSQ HOSP IP/OBS MODERATE 35: CPT

## 2024-08-19 PROCEDURE — 90853 GROUP PSYCHOTHERAPY: CPT

## 2024-08-19 RX ADMIN — Medication 1 APPLICATION(S): at 12:42

## 2024-08-19 RX ADMIN — SODIUM CHLORIDE 1 GRAM(S): 9 INJECTION INTRAMUSCULAR; INTRAVENOUS; SUBCUTANEOUS at 08:14

## 2024-08-19 RX ADMIN — ESCITALOPRAM OXALATE 10 MILLIGRAM(S): 10 TABLET ORAL at 08:14

## 2024-08-19 RX ADMIN — TAMSULOSIN HYDROCHLORIDE 0.4 MILLIGRAM(S): 0.4 CAPSULE ORAL at 20:31

## 2024-08-19 RX ADMIN — BENZTROPINE MESYLATE 1 MILLIGRAM(S): 2 TABLET ORAL at 20:29

## 2024-08-19 RX ADMIN — SODIUM CHLORIDE 1 GRAM(S): 9 INJECTION INTRAMUSCULAR; INTRAVENOUS; SUBCUTANEOUS at 12:42

## 2024-08-19 RX ADMIN — Medication 40 MILLIGRAM(S): at 20:30

## 2024-08-19 RX ADMIN — SODIUM CHLORIDE 1 GRAM(S): 9 INJECTION INTRAMUSCULAR; INTRAVENOUS; SUBCUTANEOUS at 20:30

## 2024-08-19 RX ADMIN — METFORMIN HYDROCHLORIDE 500 MILLIGRAM(S): 850 TABLET, FILM COATED ORAL at 08:14

## 2024-08-19 RX ADMIN — Medication 200 MILLIGRAM(S): at 20:29

## 2024-08-19 RX ADMIN — Medication 50 MICROGRAM(S): at 06:12

## 2024-08-19 RX ADMIN — AMLODIPINE BESYLATE 10 MILLIGRAM(S): 10 TABLET ORAL at 08:14

## 2024-08-19 RX ADMIN — Medication 200 MILLIGRAM(S): at 08:13

## 2024-08-19 RX ADMIN — METFORMIN HYDROCHLORIDE 500 MILLIGRAM(S): 850 TABLET, FILM COATED ORAL at 20:30

## 2024-08-19 NOTE — BH INPATIENT PSYCHIATRY PROGRESS NOTE - PRN MEDS
MEDICATIONS  (PRN):  acetaminophen     Tablet .. 650 milliGRAM(s) Oral every 6 hours PRN Temp greater or equal to 38C (100.4F), Mild Pain (1 - 3)  aluminum hydroxide/magnesium hydroxide/simethicone Suspension 30 milliLiter(s) Oral every 6 hours PRN Dyspepsia  chlorproMAZINE    Injectable 50 milliGRAM(s) IntraMuscular once PRN agitation  haloperidol     Tablet 2.5 milliGRAM(s) Oral every 6 hours PRN psychosis  lidocaine   4% Patch 1 Patch Transdermal daily PRN cervical pain  LORazepam     Tablet 1 milliGRAM(s) Oral every 6 hours PRN Anxiety  LORazepam   Injectable 2 milliGRAM(s) IntraMuscular once PRN Agitation  magnesium hydroxide Suspension 30 milliLiter(s) Oral daily PRN Constipation  melatonin. 3 milliGRAM(s) Oral at bedtime PRN Insomnia  polyethylene glycol 3350 17 Gram(s) Oral daily PRN constipation  traZODone 50 milliGRAM(s) Oral at bedtime PRN insomnia

## 2024-08-19 NOTE — BH INPATIENT PSYCHIATRY PROGRESS NOTE - NSBHFUPINTERVALHXFT_PSY_A_CORE
No acute events overnight. Pt compliant with meds. Reports sleeping well. Continues to report good/improved mood. Adamantly denies SIIP. Denies AH and other symptoms of psychosis. Feels that family meeting on Friday went well. Has been attending some groups, engaged in unit milieu, social with peers. Remains in good behavioral control.

## 2024-08-19 NOTE — BH INPATIENT PSYCHIATRY PROGRESS NOTE - CURRENT MEDICATION
MEDICATIONS  (STANDING):  amLODIPine   Tablet 10 milliGRAM(s) Oral daily  atorvastatin 40 milliGRAM(s) Oral at bedtime  benztropine 1 milliGRAM(s) Oral at bedtime  escitalopram 10 milliGRAM(s) Oral daily  glucagon  Injectable 1 milliGRAM(s) IntraMuscular once  hydrocortisone 1% Cream 1 Application(s) Topical two times a day  labetalol 200 milliGRAM(s) Oral two times a day  levothyroxine 50 MICROGram(s) Oral daily  metFORMIN 500 milliGRAM(s) Oral two times a day  sodium chloride 1 Gram(s) Oral three times a day  tamsulosin 0.4 milliGRAM(s) Oral at bedtime    MEDICATIONS  (PRN):  acetaminophen     Tablet .. 650 milliGRAM(s) Oral every 6 hours PRN Temp greater or equal to 38C (100.4F), Mild Pain (1 - 3)  aluminum hydroxide/magnesium hydroxide/simethicone Suspension 30 milliLiter(s) Oral every 6 hours PRN Dyspepsia  chlorproMAZINE    Injectable 50 milliGRAM(s) IntraMuscular once PRN agitation  haloperidol     Tablet 2.5 milliGRAM(s) Oral every 6 hours PRN psychosis  lidocaine   4% Patch 1 Patch Transdermal daily PRN cervical pain  LORazepam     Tablet 1 milliGRAM(s) Oral every 6 hours PRN Anxiety  LORazepam   Injectable 2 milliGRAM(s) IntraMuscular once PRN Agitation  magnesium hydroxide Suspension 30 milliLiter(s) Oral daily PRN Constipation  melatonin. 3 milliGRAM(s) Oral at bedtime PRN Insomnia  polyethylene glycol 3350 17 Gram(s) Oral daily PRN constipation  traZODone 50 milliGRAM(s) Oral at bedtime PRN insomnia

## 2024-08-19 NOTE — BH INPATIENT PSYCHIATRY PROGRESS NOTE - NSBHASSESSSUMMFT_PSY_ALL_CORE
Pt is 59yo single man with PPHx of dx schoizoaffective d/o, hx of many prior hospitalizations (last at [facility] June 2024), in outpt tx at [facility] AOPD, w/ PMHx HTN, HLD, hypothyroidism, and CVID/ hypogammaglobulinemia (on monthly IVIG - last doses 6/16/24 and 7/24/24), COPD not on home O2, hx of frequent skin infection who presented initially to [facility] for SA by overdose of labetalol related to paranoia and fear, found to have left leg abscess vs cellulitis and was transferred to [facility] on 7/22/24 for IVIG infusions (received 7/24/24), followed by psych CL, then medically cleared and transferred to [facility] on 7/26/24 for further psychiatric treatment.    Psychosis remains much improved (pt consistently denying paranoia and AH), as does pt's mood (which is stable and euthymic). Continues to deny SIIP.     Plan:  Psych:  - S/p Haldol Dec 200mg IM on 8/7/24, next due 8/28/24 (switched from [id number] to [id number])  - C/w Cogentin 1mg qhs, may be able to taper further   - C/w Lexapro 10mg daily     Medical:  - CVID: IVIG infusion q3.5weeks (last 8/15)  - Hand rash: improved, c/w cortisone  BID  - HTN: Norvasc 10mg daily, Labetalol 200mg BID   - HLD: Lipitor 40mg qhs  - Hypothyroidism: Synthroid 50mcg daily  - Hyponatremia: NaCl tabs TID, BMP on 8/16 within normal limits  - BPH: Flomax 0.4mg qhs  - Prediabetes (with A1C 5.8): Metformin 500mg BID  - Left thigh wound - s/p treatment at [facility] with antibiotics (seen by ID and wound care), healing with continued wound care  - Cervical pain: resolved, lidocaine patch prn, s/p imaging on 8/9 with no acute findings    - Conjunctivitis: resolved, s/p course of Tobramycin drops      Dispo:  - s/p family meeting w/ sisters, housing, and  on 8/16  - Refer to PACE program

## 2024-08-19 NOTE — BH INPATIENT PSYCHIATRY PROGRESS NOTE - NSBHMETABOLIC_PSY_ALL_CORE_FT
BMI: BMI (kg/m2): 31.8 (08-08-24 @ 15:27)  HbA1c: A1C with Estimated Average Glucose Result: 5.8 % (07-24-24 @ 07:48)    Glucose: POCT Blood Glucose.: 120 mg/dL (08-13-24 @ 11:33)    BP: --Vital Signs Last 24 Hrs  T(C): 36.4 (08-19-24 @ 08:25), Max: 36.4 (08-18-24 @ 19:14)  T(F): 97.6 (08-19-24 @ 08:25), Max: 97.6 (08-18-24 @ 19:14)  HR: --  BP: --  BP(mean): --  RR: --  SpO2: --    Orthostatic VS  08-19-24 @ 08:25  Lying BP: --/-- HR: --  Sitting BP: 133/77 HR: 69  Standing BP: 110/65 HR: 82  Site: --  Mode: --  Orthostatic VS  08-18-24 @ 19:14  Lying BP: --/-- HR: --  Sitting BP: 130/70 HR: 67  Standing BP: 125/67 HR: 68  Site: --  Mode: --  Orthostatic VS  08-18-24 @ 08:31  Lying BP: --/-- HR: --  Sitting BP: 126/67 HR: 65  Standing BP: 120/64 HR: 68  Site: --  Mode: --  Orthostatic VS  08-17-24 @ 19:02  Lying BP: --/-- HR: --  Sitting BP: 129/76 HR: 59  Standing BP: 119/65 HR: 65  Site: --  Mode: --    Lipid Panel: Date/Time: 07-24-24 @ 07:48  Cholesterol, Serum: 81  LDL Cholesterol Calculated: 34  HDL Cholesterol, Serum: 30  Total Cholesterol/HDL Ration Measurement: --  Triglycerides, Serum: 84

## 2024-08-20 PROCEDURE — 90853 GROUP PSYCHOTHERAPY: CPT

## 2024-08-20 PROCEDURE — 99231 SBSQ HOSP IP/OBS SF/LOW 25: CPT

## 2024-08-20 RX ORDER — LORAZEPAM 4 MG/ML
2 INJECTION INTRAMUSCULAR; INTRAVENOUS ONCE
Refills: 0 | Status: DISCONTINUED | OUTPATIENT
Start: 2024-08-20 | End: 2024-08-27

## 2024-08-20 RX ORDER — LORAZEPAM 4 MG/ML
1 INJECTION INTRAMUSCULAR; INTRAVENOUS EVERY 6 HOURS
Refills: 0 | Status: DISCONTINUED | OUTPATIENT
Start: 2024-08-20 | End: 2024-08-27

## 2024-08-20 RX ORDER — HYDROCORTISONE 1 %
1 OINTMENT (GRAM) TOPICAL
Refills: 0 | Status: DISCONTINUED | OUTPATIENT
Start: 2024-08-20 | End: 2024-08-28

## 2024-08-20 RX ADMIN — Medication 40 MILLIGRAM(S): at 20:42

## 2024-08-20 RX ADMIN — Medication 200 MILLIGRAM(S): at 08:03

## 2024-08-20 RX ADMIN — AMLODIPINE BESYLATE 10 MILLIGRAM(S): 10 TABLET ORAL at 08:03

## 2024-08-20 RX ADMIN — Medication 50 MILLIGRAM(S): at 20:42

## 2024-08-20 RX ADMIN — Medication 50 MICROGRAM(S): at 05:59

## 2024-08-20 RX ADMIN — SODIUM CHLORIDE 1 GRAM(S): 9 INJECTION INTRAMUSCULAR; INTRAVENOUS; SUBCUTANEOUS at 08:04

## 2024-08-20 RX ADMIN — BENZTROPINE MESYLATE 1 MILLIGRAM(S): 2 TABLET ORAL at 20:42

## 2024-08-20 RX ADMIN — TAMSULOSIN HYDROCHLORIDE 0.4 MILLIGRAM(S): 0.4 CAPSULE ORAL at 20:42

## 2024-08-20 RX ADMIN — SODIUM CHLORIDE 1 GRAM(S): 9 INJECTION INTRAMUSCULAR; INTRAVENOUS; SUBCUTANEOUS at 20:42

## 2024-08-20 RX ADMIN — SODIUM CHLORIDE 1 GRAM(S): 9 INJECTION INTRAMUSCULAR; INTRAVENOUS; SUBCUTANEOUS at 13:09

## 2024-08-20 RX ADMIN — METFORMIN HYDROCHLORIDE 500 MILLIGRAM(S): 850 TABLET, FILM COATED ORAL at 20:43

## 2024-08-20 RX ADMIN — Medication 3 MILLIGRAM(S): at 20:42

## 2024-08-20 RX ADMIN — METFORMIN HYDROCHLORIDE 500 MILLIGRAM(S): 850 TABLET, FILM COATED ORAL at 08:04

## 2024-08-20 RX ADMIN — ESCITALOPRAM OXALATE 10 MILLIGRAM(S): 10 TABLET ORAL at 08:04

## 2024-08-20 NOTE — BH INPATIENT PSYCHIATRY PROGRESS NOTE - NSBHASSESSSUMMFT_PSY_ALL_CORE
Pt is 59yo single man with PPHx of dx schoizoaffective d/o, hx of many prior hospitalizations (last at [facility] June 2024), in outpt tx at [facility] AOPD, w/ PMHx HTN, HLD, hypothyroidism, and CVID/ hypogammaglobulinemia (on monthly IVIG - last doses 6/16/24 and 7/24/24), COPD not on home O2, hx of frequent skin infection who presented initially to [facility] for SA by overdose of labetalol related to paranoia and fear, found to have left leg abscess vs cellulitis and was transferred to [facility] on 7/22/24 for IVIG infusions (received 7/24/24), followed by psych CL, then medically cleared and transferred to [facility] on 7/26/24 for further psychiatric treatment.    No interval change. Psychosis remains much improved (pt consistently denying paranoia and AH), as does pt's mood (which is stable and euthymic). Continues to deny SIIP.     Plan:  Psych:  - S/p Haldol Dec 200mg IM on 8/7/24, next due 8/28/24 (switched from [id number] to [id number])  - C/w Cogentin 1mg qhs, may be able to taper further   - C/w Lexapro 10mg daily     Medical:  - CVID: IVIG infusion q3.5weeks (last 8/15)  - HTN: Norvasc 10mg daily, Labetalol 200mg BID   - HLD: Lipitor 40mg qhs  - Hypothyroidism: Synthroid 50mcg daily  - Hyponatremia: NaCl tabs TID, BMP on 8/16 within normal limits  - BPH: Flomax 0.4mg qhs  - Prediabetes (with A1C 5.8): Metformin 500mg BID  - Left thigh wound - s/p treatment at [facility] with antibiotics (seen by ID and wound care), healing with continued wound care  - Hand rash: improved, c/w cortisone BID prn  - Neck pain: resolved, lidocaine patch prn, s/p imaging on 8/9 with no acute findings    - Conjunctivitis: resolved, s/p course of Tobramycin drops      Dispo:  - s/p family meeting w/ sisters, housing, and  on 8/16  - Refer to PACE program, ELIAN setting up home care and other services

## 2024-08-20 NOTE — BH INPATIENT PSYCHIATRY PROGRESS NOTE - NSBHMETABOLIC_PSY_ALL_CORE_FT
BMI: BMI (kg/m2): 31.6 (08-20-24 @ 08:19)  HbA1c: A1C with Estimated Average Glucose Result: 5.8 % (07-24-24 @ 07:48)    Glucose: POCT Blood Glucose.: 120 mg/dL (08-13-24 @ 11:33)    BP: --Vital Signs Last 24 Hrs  T(C): 36.3 (08-20-24 @ 08:35), Max: 36.3 (08-20-24 @ 08:35)  T(F): 97.4 (08-20-24 @ 08:35), Max: 97.4 (08-20-24 @ 08:35)  HR: --  BP: --  BP(mean): --  RR: --  SpO2: --    Orthostatic VS  08-20-24 @ 08:35  Lying BP: --/-- HR: --  Sitting BP: 121/72 HR: 60  Standing BP: 100/60 HR: 60  Site: --  Mode: --  Orthostatic VS  08-19-24 @ 08:25  Lying BP: --/-- HR: --  Sitting BP: 133/77 HR: 69  Standing BP: 110/65 HR: 82  Site: --  Mode: --  Orthostatic VS  08-18-24 @ 19:14  Lying BP: --/-- HR: --  Sitting BP: 130/70 HR: 67  Standing BP: 125/67 HR: 68  Site: --  Mode: --    Lipid Panel: Date/Time: 07-24-24 @ 07:48  Cholesterol, Serum: 81  LDL Cholesterol Calculated: 34  HDL Cholesterol, Serum: 30  Total Cholesterol/HDL Ration Measurement: --  Triglycerides, Serum: 84

## 2024-08-20 NOTE — BH INPATIENT PSYCHIATRY PROGRESS NOTE - NSBHFUPINTERVALHXFT_PSY_A_CORE
Pt reports good/stable mood. Denies SIIP. Remains hopeful about the future. Denies AH, intrusive thoughts, or other paranoia. Remains engaged on the unit, attending groups and social with peers. Feeling physically well. Compliant with meds and sleep well. In good behavioral control.

## 2024-08-21 ENCOUNTER — NON-APPOINTMENT (OUTPATIENT)
Age: 59
End: 2024-08-21

## 2024-08-21 PROCEDURE — 99231 SBSQ HOSP IP/OBS SF/LOW 25: CPT

## 2024-08-21 PROCEDURE — 90853 GROUP PSYCHOTHERAPY: CPT

## 2024-08-21 RX ADMIN — Medication 40 MILLIGRAM(S): at 20:23

## 2024-08-21 RX ADMIN — SODIUM CHLORIDE 1 GRAM(S): 9 INJECTION INTRAMUSCULAR; INTRAVENOUS; SUBCUTANEOUS at 08:31

## 2024-08-21 RX ADMIN — ESCITALOPRAM OXALATE 10 MILLIGRAM(S): 10 TABLET ORAL at 08:31

## 2024-08-21 RX ADMIN — SODIUM CHLORIDE 1 GRAM(S): 9 INJECTION INTRAMUSCULAR; INTRAVENOUS; SUBCUTANEOUS at 13:18

## 2024-08-21 RX ADMIN — TAMSULOSIN HYDROCHLORIDE 0.4 MILLIGRAM(S): 0.4 CAPSULE ORAL at 20:24

## 2024-08-21 RX ADMIN — METFORMIN HYDROCHLORIDE 500 MILLIGRAM(S): 850 TABLET, FILM COATED ORAL at 20:24

## 2024-08-21 RX ADMIN — BENZTROPINE MESYLATE 1 MILLIGRAM(S): 2 TABLET ORAL at 20:24

## 2024-08-21 RX ADMIN — Medication 50 MICROGRAM(S): at 06:24

## 2024-08-21 RX ADMIN — Medication 50 MILLIGRAM(S): at 20:24

## 2024-08-21 RX ADMIN — SODIUM CHLORIDE 1 GRAM(S): 9 INJECTION INTRAMUSCULAR; INTRAVENOUS; SUBCUTANEOUS at 20:25

## 2024-08-21 RX ADMIN — Medication 3 MILLIGRAM(S): at 20:24

## 2024-08-21 RX ADMIN — Medication 200 MILLIGRAM(S): at 20:24

## 2024-08-21 RX ADMIN — METFORMIN HYDROCHLORIDE 500 MILLIGRAM(S): 850 TABLET, FILM COATED ORAL at 08:31

## 2024-08-21 NOTE — BH INPATIENT PSYCHIATRY PROGRESS NOTE - NSBHMETABOLIC_PSY_ALL_CORE_FT
BMI: BMI (kg/m2): 31.6 (08-20-24 @ 08:19)  HbA1c: A1C with Estimated Average Glucose Result: 5.8 % (07-24-24 @ 07:48)    Glucose: POCT Blood Glucose.: 120 mg/dL (08-13-24 @ 11:33)    BP: 117/76 (08-20-24 @ 20:47) (117/76 - 117/76)Vital Signs Last 24 Hrs  T(C): 36.3 (08-21-24 @ 06:43), Max: 36.9 (08-20-24 @ 20:19)  T(F): 97.4 (08-21-24 @ 06:43), Max: 98.5 (08-20-24 @ 20:19)  HR: 55 (08-20-24 @ 20:47) (55 - 55)  BP: 117/76 (08-20-24 @ 20:47) (117/76 - 117/76)  BP(mean): --  RR: --  SpO2: --    Orthostatic VS  08-21-24 @ 06:43  Lying BP: --/-- HR: --  Sitting BP: 125/75 HR: 57  Standing BP: 110/61 HR: 74  Site: upper right arm  Mode: electronic  Orthostatic VS  08-20-24 @ 20:19  Lying BP: --/-- HR: --  Sitting BP: 111/58 HR: 55  Standing BP: 113/63 HR: 60  Site: --  Mode: --  Orthostatic VS  08-20-24 @ 08:35  Lying BP: --/-- HR: --  Sitting BP: 121/72 HR: 60  Standing BP: 100/60 HR: 60  Site: --  Mode: --    Lipid Panel: Date/Time: 07-24-24 @ 07:48  Cholesterol, Serum: 81  LDL Cholesterol Calculated: 34  HDL Cholesterol, Serum: 30  Total Cholesterol/HDL Ration Measurement: --  Triglycerides, Serum: 84

## 2024-08-21 NOTE — BH INPATIENT PSYCHIATRY PROGRESS NOTE - PRN MEDS
MEDICATIONS  (PRN):  acetaminophen     Tablet .. 650 milliGRAM(s) Oral every 6 hours PRN Temp greater or equal to 38C (100.4F), Mild Pain (1 - 3)  aluminum hydroxide/magnesium hydroxide/simethicone Suspension 30 milliLiter(s) Oral every 6 hours PRN Dyspepsia  chlorproMAZINE    Injectable 50 milliGRAM(s) IntraMuscular once PRN agitation  haloperidol     Tablet 2.5 milliGRAM(s) Oral every 6 hours PRN psychosis  hydrocortisone 1% Cream 1 Application(s) Topical two times a day PRN rash on fingers  lidocaine   4% Patch 1 Patch Transdermal daily PRN cervical pain  LORazepam     Tablet 1 milliGRAM(s) Oral every 6 hours PRN Anxiety  LORazepam   Injectable 2 milliGRAM(s) IntraMuscular once PRN Agitation  magnesium hydroxide Suspension 30 milliLiter(s) Oral daily PRN Constipation  melatonin. 3 milliGRAM(s) Oral at bedtime PRN Insomnia  polyethylene glycol 3350 17 Gram(s) Oral daily PRN constipation  traZODone 50 milliGRAM(s) Oral at bedtime PRN insomnia

## 2024-08-21 NOTE — BH INPATIENT PSYCHIATRY PROGRESS NOTE - NSBHASSESSSUMMFT_PSY_ALL_CORE
Pt is 59yo single man with PPHx of dx schoizoaffective d/o, hx of many prior hospitalizations (last at [facility] June 2024), in outpt tx at [facility] AOPD, w/ PMHx HTN, HLD, hypothyroidism, and CVID/ hypogammaglobulinemia (on monthly IVIG - last doses 6/16/24 and 7/24/24), COPD not on home O2, hx of frequent skin infection who presented initially to [facility] for SA by overdose of labetalol related to paranoia and fear, found to have left leg abscess vs cellulitis and was transferred to [facility] on 7/22/24 for IVIG infusions (received 7/24/24), followed by psych CL, then medically cleared and transferred to [facility] on 7/26/24 for further psychiatric treatment.    No interval change. Psychosis remains much improved (pt consistently denying paranoia and AH), as does pt's mood (which is stable and euthymic). Continues to deny SIIP.     Plan:  Psych:  - S/p Haldol Dec 200mg IM on 8/7/24, next due 8/28/24 (switched from [id number] to [id number])  - C/w Cogentin 1mg qhs, may be able to taper further   - C/w Lexapro 10mg daily     Medical:  - CVID: IVIG infusion q3.5weeks (last 8/15)  - HTN: Norvasc 10mg daily, Labetalol 200mg BID   - HLD: Lipitor 40mg qhs  - Hypothyroidism: Synthroid 50mcg daily  - Hyponatremia: NaCl tabs TID, BMP on 8/16 within normal limits  - BPH: Flomax 0.4mg qhs  - Prediabetes (with A1C 5.8): Metformin 500mg BID  - Left thigh wound - s/p treatment at [facility] with antibiotics (seen by ID and wound care), healing with continued wound care  - Hand rash: improved, c/w cortisone BID prn  - Neck pain: resolved, lidocaine patch prn, s/p imaging on 8/9 with no acute findings    - Conjunctivitis: resolved, s/p course of Tobramycin drops      Dispo:  - s/p family meeting w/ sisters, housing, and  on 8/16  - Refer to PACE program, ELIAN setting up home care and other services

## 2024-08-21 NOTE — BH INPATIENT PSYCHIATRY PROGRESS NOTE - NSBHFUPINTERVALHXFT_PSY_A_CORE
No acute events overnight. Pt sleeping well. Remains with good/stable mood and denies SIIP. Feeling very hopeful. Denies AH or intrusive thoughts. Denies feeling paranoid. Continues to attend groups, engaged in unit milieu.

## 2024-08-21 NOTE — BH INPATIENT PSYCHIATRY PROGRESS NOTE - CURRENT MEDICATION
MEDICATIONS  (STANDING):  amLODIPine   Tablet 10 milliGRAM(s) Oral daily  atorvastatin 40 milliGRAM(s) Oral at bedtime  benztropine 1 milliGRAM(s) Oral at bedtime  escitalopram 10 milliGRAM(s) Oral daily  glucagon  Injectable 1 milliGRAM(s) IntraMuscular once  labetalol 200 milliGRAM(s) Oral two times a day  levothyroxine 50 MICROGram(s) Oral daily  metFORMIN 500 milliGRAM(s) Oral two times a day  sodium chloride 1 Gram(s) Oral three times a day  tamsulosin 0.4 milliGRAM(s) Oral at bedtime    MEDICATIONS  (PRN):  acetaminophen     Tablet .. 650 milliGRAM(s) Oral every 6 hours PRN Temp greater or equal to 38C (100.4F), Mild Pain (1 - 3)  aluminum hydroxide/magnesium hydroxide/simethicone Suspension 30 milliLiter(s) Oral every 6 hours PRN Dyspepsia  chlorproMAZINE    Injectable 50 milliGRAM(s) IntraMuscular once PRN agitation  haloperidol     Tablet 2.5 milliGRAM(s) Oral every 6 hours PRN psychosis  hydrocortisone 1% Cream 1 Application(s) Topical two times a day PRN rash on fingers  lidocaine   4% Patch 1 Patch Transdermal daily PRN cervical pain  LORazepam     Tablet 1 milliGRAM(s) Oral every 6 hours PRN Anxiety  LORazepam   Injectable 2 milliGRAM(s) IntraMuscular once PRN Agitation  magnesium hydroxide Suspension 30 milliLiter(s) Oral daily PRN Constipation  melatonin. 3 milliGRAM(s) Oral at bedtime PRN Insomnia  polyethylene glycol 3350 17 Gram(s) Oral daily PRN constipation  traZODone 50 milliGRAM(s) Oral at bedtime PRN insomnia

## 2024-08-22 PROCEDURE — 90853 GROUP PSYCHOTHERAPY: CPT

## 2024-08-22 PROCEDURE — 99232 SBSQ HOSP IP/OBS MODERATE 35: CPT

## 2024-08-22 RX ORDER — HYDROCORTISONE 1 %
1 OINTMENT (GRAM) TOPICAL
Qty: 0 | Refills: 0 | DISCHARGE
Start: 2024-08-22

## 2024-08-22 RX ORDER — POLYETHYLENE GLYCOL 3350 17 G/17G
17 POWDER, FOR SOLUTION ORAL
Qty: 0 | Refills: 0 | DISCHARGE
Start: 2024-08-22

## 2024-08-22 RX ADMIN — BENZTROPINE MESYLATE 1 MILLIGRAM(S): 2 TABLET ORAL at 20:44

## 2024-08-22 RX ADMIN — SODIUM CHLORIDE 1 GRAM(S): 9 INJECTION INTRAMUSCULAR; INTRAVENOUS; SUBCUTANEOUS at 20:44

## 2024-08-22 RX ADMIN — TAMSULOSIN HYDROCHLORIDE 0.4 MILLIGRAM(S): 0.4 CAPSULE ORAL at 20:44

## 2024-08-22 RX ADMIN — SODIUM CHLORIDE 1 GRAM(S): 9 INJECTION INTRAMUSCULAR; INTRAVENOUS; SUBCUTANEOUS at 12:49

## 2024-08-22 RX ADMIN — METFORMIN HYDROCHLORIDE 500 MILLIGRAM(S): 850 TABLET, FILM COATED ORAL at 08:25

## 2024-08-22 RX ADMIN — Medication 50 MICROGRAM(S): at 06:55

## 2024-08-22 RX ADMIN — METFORMIN HYDROCHLORIDE 500 MILLIGRAM(S): 850 TABLET, FILM COATED ORAL at 20:44

## 2024-08-22 RX ADMIN — Medication 200 MILLIGRAM(S): at 20:44

## 2024-08-22 RX ADMIN — AMLODIPINE BESYLATE 10 MILLIGRAM(S): 10 TABLET ORAL at 08:25

## 2024-08-22 RX ADMIN — Medication 200 MILLIGRAM(S): at 08:26

## 2024-08-22 RX ADMIN — ESCITALOPRAM OXALATE 10 MILLIGRAM(S): 10 TABLET ORAL at 08:25

## 2024-08-22 RX ADMIN — SODIUM CHLORIDE 1 GRAM(S): 9 INJECTION INTRAMUSCULAR; INTRAVENOUS; SUBCUTANEOUS at 08:25

## 2024-08-22 RX ADMIN — Medication 40 MILLIGRAM(S): at 20:44

## 2024-08-22 RX ADMIN — Medication 50 MILLIGRAM(S): at 20:44

## 2024-08-22 NOTE — BH INPATIENT PSYCHIATRY DISCHARGE NOTE - NSBHFUPINTERVALCCFT_PSY_A_CORE
f/u mood and psychosis Patient seen, chart reviewed, case discussed with team.  Patient seen for discahrge day management of suicide attempt.

## 2024-08-22 NOTE — BH PSYCHOLOGY - GROUP THERAPY NOTE - NSPSYCHOLGRPGENPROB_PSY_A_CORE
academic/vocational/social dysfunction/anxiety/depression

## 2024-08-22 NOTE — BH PSYCHOLOGY - GROUP THERAPY NOTE - NSBHPSYCHOLRESPONSE_PSY_A_CORE
Symptoms reduced/Coping skills acquired/Accepted support
Accepted support
Symptoms reduced/Coping skills acquired/Accepted support

## 2024-08-22 NOTE — BH INPATIENT PSYCHIATRY DISCHARGE NOTE - HPI (INCLUDE ILLNESS QUALITY, SEVERITY, DURATION, TIMING, CONTEXT, MODIFYING FACTORS, ASSOCIATED SIGNS AND SYMPTOMS)
TRANSFER FROM [facility] CL: 57 yo M with PPhx significant for schizoaffective disorder, history of multiple inpatient psychiatric hospitalizations (last hospitalized at [facility] from 6/22-7/12/24), follows outpatient at [facility] with Dr. Cook, and PMH significant for HTN, HLD, hypothyroidism, CVID/ hypogammaglobulinemia (on monthly IVIG - Last dose Jun 16th, 2024), COPD not on home O2, hx of frequent skin infection who presented as initially to [facility] for  overdose of labetalol, but was also found to have left leg abscess vs cellulitis and was transferred on 7/22/24 for IVIG infusions (received 7/24/24).  Psychiatry evaluated Patient: "... somewhat childlike, but overall pleasant and engaged. He recalls that he on the day of discharge from [facility], he started having paranoid thoughts about people wanting to kill him and that others were out to get him. States that the paranoia was so distressing that he immediately thought of ending his life and overdosed on numerous labetalol pills. He started to feel nauseated and realized that he does want to live and immediately called the ambulance where he was taken to [facility]. Patient expresses that the negative thoughts, paranoia, suicidal thoughts were difficult for him when he returned home from his psychiatric hospitalization because he has a hard time talking to others about it. He reports that he follows with Dr. Cook at [facility] on an outpatient basis, and has been doing well with individualized treatment with her." Per chart review, patient was switched from abilify to haldol during his last hospitalization. At discharge he was taking haldol 10 mg PO BID. He received haldol decanoate 100 mg IM on 7/5 and the next loading dose of 100 mg IM on 7/10. He will be due for maintenance dose of haldol decanoate 200 mg IM on 8/8/24. Patient reports that he recalls that he received 2 of the haldol shots and the plan was to continue treatment on outpatient basis.     COLLATERAL FROM PT'S SISTERS AS PER CL NOTE: "patient did well on haldol for several years but decompensated on the abilify. They report that when he was switched to abilify, he started having a resurgence and worsening of the paranoia and negative thoughts. They believe that patient needs more time on the haldol without the abilify. Furthermore, they relay that patient was also struggling with hyponatremia and the skin infections, which likely were also contributing to worsening in mental status at the time. "    AT [facility]:  Left lateral leg w/ pain and purulence. Immunocompromised given hx of CVID. Currently w/o systemic signs of infection. MRSA sputum and nares + in OSH. s/p Vanc --> transitioned to Bactrim (Started on 7/13), s/p course of vanco at [facility], c/w doxycycline through 7/28; need for MRI. pt also very claustrophobic. MRI order d/c. 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 Mitchell Boxer. s/p IVIG x1 at [facility] 07/24/24. TASH -  Elevated creatine 1.8, BUN 26. Creatine in June 2024. 0.7, drug induced at [facility] vs ATN from shock;  U/A relatively bland with mild spot proteinuria; renal/bladder U/S: no hydronephrosis. hold home ARB    ON L3: calm, cooperative, childlike, concrete, fully engages, no complaints, denies acute mood sxs, denies suicidality.

## 2024-08-22 NOTE — BH INPATIENT PSYCHIATRY DISCHARGE NOTE - MSE UNSTRUCTURED FT
Pt is a 59yo man with appropriate grooming and hygiene. He is calm and cooperative with good eye contact. No psychomotor abnormalities noted. Speech is normal in rate and volume, spontaneous. Stated mood is "good." Affect is euthymic, full. TP is linear. TC: without paranoia or evidence of mason delusions, denies SIIP, denies intrusive thoughts, +hopeful. No perceptual disturbances noted or reported. Insight and judgement are fair. Impulse control is intact.

## 2024-08-22 NOTE — BH INPATIENT PSYCHIATRY PROGRESS NOTE - NSBHMETABOLIC_PSY_ALL_CORE_FT
BMI: BMI (kg/m2): 31.6 (08-20-24 @ 08:19)  HbA1c: A1C with Estimated Average Glucose Result: 5.8 % (07-24-24 @ 07:48)    Glucose: POCT Blood Glucose.: 120 mg/dL (08-13-24 @ 11:33)    BP: 117/76 (08-20-24 @ 20:47) (117/76 - 117/76)Vital Signs Last 24 Hrs  T(C): 36.4 (08-22-24 @ 08:30), Max: 37.2 (08-21-24 @ 19:02)  T(F): 97.5 (08-22-24 @ 08:30), Max: 98.9 (08-21-24 @ 19:02)  HR: --  BP: --  BP(mean): --  RR: --  SpO2: --    Orthostatic VS  08-22-24 @ 08:30  Lying BP: --/-- HR: --  Sitting BP: 131/72 HR: 60  Standing BP: 114/58 HR: 71  Site: --  Mode: --  Orthostatic VS  08-21-24 @ 19:02  Lying BP: --/-- HR: --  Sitting BP: 133/72 HR: 67  Standing BP: 121/63 HR: 68  Site: --  Mode: --  Orthostatic VS  08-21-24 @ 06:43  Lying BP: --/-- HR: --  Sitting BP: 125/75 HR: 57  Standing BP: 110/61 HR: 74  Site: upper right arm  Mode: electronic  Orthostatic VS  08-20-24 @ 20:19  Lying BP: --/-- HR: --  Sitting BP: 111/58 HR: 55  Standing BP: 113/63 HR: 60  Site: --  Mode: --    Lipid Panel: Date/Time: 07-24-24 @ 07:48  Cholesterol, Serum: 81  LDL Cholesterol Calculated: 34  HDL Cholesterol, Serum: 30  Total Cholesterol/HDL Ration Measurement: --  Triglycerides, Serum: 84

## 2024-08-22 NOTE — BH INPATIENT PSYCHIATRY DISCHARGE NOTE - NSDCCPCAREPLAN_GEN_ALL_CORE_FT
PRINCIPAL DISCHARGE DIAGNOSIS  Diagnosis: Schizoaffective disorder, depressive type  Assessment and Plan of Treatment: continue taking Haldol Dec injection every three weeks; continue taking Lexapro; follow-up with outpt care at the [facility]

## 2024-08-22 NOTE — BH PSYCHOLOGY - GROUP THERAPY NOTE - NSPSYCHOLGRPGENINT_PSY_A_CORE
cognitive/behavioral therapy/problem solving techniques discussed/stress management/supported coping skills/supportive therapy/treatment compliance encouraged/social skills training

## 2024-08-22 NOTE — BH INPATIENT PSYCHIATRY DISCHARGE NOTE - HOSPITAL COURSE
Pt is 59yo single man with PPHx of dx schoizoaffective d/o, hx of many prior hospitalizations (last at [facility] June 2024), in outpt tx at [facility] AOPD, w/ PMHx HTN, HLD, hypothyroidism, and CVID/ hypogammaglobulinemia (on monthly IVIG - last doses 6/16/24 and 7/24/24), COPD not on home O2, hx of frequent skin infection who presented initially to [facility] for SA by overdose of labetalol related to paranoia and fear, found to have left leg abscess vs cellulitis and was transferred to [facility] on 7/22/24 for IVIG infusions (received 7/24/24), followed by psych CL, then medically cleared and transferred to [facility] on 7/26/24 for further psychiatric treatment.    Pt initially presented with low mood and suicidal thoughts s/p suicide attempt by overdose on Labetalol. This was related to psychotic symptoms including paranoia and persecutory delusions that others were monitoring him with intent to harm him, AH, and intrusive thoughts regarding molestation.     Pt had previously received loading doses of Haldol Dec (prior to current admission), and he was administered first maintenance dose of 200mg IM on 8/7/24 with plan to administer [id number] instead of [id number]. He was initially on Haldol 5mg PO overlap, which was discontinued.   Pt was started on Cogentin 1mg BID for EPS prophylaxis, which was later tapered to 1mg qhs (of note, pt exhibits slight b/l hand tremors though no other evidence of EPS).   Pt was started on Lexapro 10mg daily for mood.     Over hospitalization pt's psychotic symptoms resolved (pt no longer paranoid, denied AH and intrusive thoughts) and mood improved significantly. He consistently denied SIIP and was hopeful about the future. He attended groups on the unit and was social with peers. He slept well and exhibited good appetite with appropriate PO intake. He tended to ADLs appropriately. He was in good behavioral control and never became aggressive or violent. He did not attempt to self-harm on the unit. His two sisters were very involved in pt's treatment and very supportive. Prior to discharge we held a case conference/family meeting with the treatment team, his two sisters, pt's housing, and pt's  (to discuss treatment plan and discharge plan).     Pt is 57yo single man with PPHx of dx schoizoaffective d/o, hx of many prior hospitalizations (last at [facility] June 2024), in outpt tx at [facility] AOPD, w/ PMHx HTN, HLD, hypothyroidism, and CVID/ hypogammaglobulinemia (on monthly IVIG - last doses 6/16/24 and 7/24/24), COPD not on home O2, hx of frequent skin infection who presented initially to [facility] for SA by overdose of labetalol related to paranoia and fear, found to have left leg abscess vs cellulitis and was transferred to [facility] on 7/22/24 for IVIG infusions (received 7/24/24), followed by psych CL, then medically cleared and transferred to [facility] on 7/26/24 for further psychiatric treatment.    Pt initially presented with low mood and suicidal thoughts s/p suicide attempt by overdose on Labetalol. This was related to psychotic symptoms including paranoia and persecutory delusions that others were monitoring him with intent to harm him, AH, and intrusive thoughts regarding molestation.     Pt had previously received loading doses of Haldol Dec (prior to current admission), and he was administered first maintenance dose of 200mg IM on 8/7/24 with plan to administer [id number] instead of [id number]. He was initially on Haldol 5mg PO overlap, which was discontinued.   Pt was started on Cogentin 1mg BID for EPS prophylaxis, which was later tapered to 1mg qhs (of note, pt exhibits slight b/l hand tremors though no other evidence of EPS).   Pt was started on Lexapro 10mg daily for mood.     Over hospitalization pt's psychotic symptoms resolved (pt no longer paranoid, denied AH and intrusive thoughts) and mood improved significantly. He consistently denied SIIP and was hopeful about the future. He attended groups on the unit and was social with peers. He slept well and exhibited good appetite with appropriate PO intake. He tended to ADLs appropriately. He was in good behavioral control and never became aggressive or violent. He did not attempt to self-harm on the unit. His two sisters were very involved in pt's treatment and very supportive. Prior to discharge we held a case conference/family meeting with the treatment team, his two sisters, pt's housing, and pt's  (to discuss treatment plan and discharge plan).          DISCHARGED WITH 30 DAY SUPPLY OF   benztropine 1 mg oral tablet 1 tab(s) orally once a day (at bedtime) ; Active    Haldol Decanoate 100 mg/mL intramuscular solution 200 milligram(s) intramuscularly every 3 weeks Last administered on 8/28/24; NEXT DUE 9/18/24 ; Active    hydrocortisone 1% topical cream 1 Apply topically to affected area 2 times a day As needed rash on fingers ; Active    Lexapro 10 mg oral tablet 1 tab(s) orally once a day ; Active    Lipitor 40 mg oral tablet 1 tab(s) orally once a day (at bedtime) ; Active    melatonin 3 mg oral tablet 1 tab(s) orally once a day (at bedtime) As needed Insomnia ; Active    metFORMIN 500 mg oral tablet 1 tab(s) orally 2 times a day ; Active    polyethylene glycol 3350 oral powder for reconstitution 17 gram(s) orally once a day As needed constipation ; Active    Sodium Chloride 1 g oral tablet 1 tab(s) orally 3 times a day ; Active    Synthroid 50 mcg (0.05 mg) oral tablet 1 tab(s) orally once a day ; Active

## 2024-08-22 NOTE — BH INPATIENT PSYCHIATRY DISCHARGE NOTE - DESCRIPTION
patient is single, unemployed on SSD, lives alone in supportive housing. Has 2 sisters who are his primary supports. Enjoys greenery, nature, going to [place], going to the [place]. 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 Does not have children. Not in a relationship.

## 2024-08-22 NOTE — BH INPATIENT PSYCHIATRY DISCHARGE NOTE - NSBHASSESSSUMMFT_PSY_ALL_CORE
Pt is 59yo single man with PPHx of dx schoizoaffective d/o, hx of many prior hospitalizations (last at [facility] June 2024), in outpt tx at [facility] AOPD, w/ PMHx HTN, HLD, hypothyroidism, and CVID/ hypogammaglobulinemia (on monthly IVIG - last doses 6/16/24 and 7/24/24), COPD not on home O2, hx of frequent skin infection who presented initially to [facility] for SA by overdose of labetalol related to paranoia and fear, found to have left leg abscess vs cellulitis and was transferred to [facility] on 7/22/24 for IVIG infusions (received 7/24/24), followed by psych CL, then medically cleared and transferred to [facility] on 7/26/24 for further psychiatric treatment.    No interval change. Psychosis remains much improved (pt consistently denying paranoia and AH), as does pt's mood (which is stable and euthymic). Continues to deny SIIP.  Scheduled for Haldol Dec 200mg BELLA on 8/28, e-scripts sent to [facility].  Requires continued home care as noted.    Plan:  Psych:  - S/p Haldol Dec 200mg IM on 8/7/24, next due 8/28/24 (switched from [id number] to [id number])  - C/w Cogentin 1mg qhs  - C/w Lexapro 10mg daily     Medical:  - CVID: IVIG infusion q3.5weeks (last 8/15)  - HTN: Norvasc 10mg daily, Labetalol 200mg BID   - HLD: Lipitor 40mg qhs  - Hypothyroidism: Synthroid 50mcg daily  - Hyponatremia: NaCl tabs TID, BMP on 8/16 within normal limits  - BPH: Flomax 0.4mg qhs  - Prediabetes (with A1C 5.8): Metformin 500mg BID  - Left thigh wound - s/p treatment at [facility] with antibiotics (seen by ID and wound care), healing with continued wound care  - Hand rash: improved, c/w cortisone BID prn  - Neck pain: resolved, lidocaine patch prn, s/p imaging on 8/9 with no acute findings    - Conjunctivitis: resolved, s/p course of Tobramycin drops      Dispo:  - s/p family meeting w/ sisters, housing, and  on 8/16  - Refer to [facility] program, ELIAN setting up home care and other services    Upon discharge f/u at [facility] [address] [facility] [phone] Pt is 59yo single man with PPHx of dx schoizoaffective d/o, hx of many prior hospitalizations (last at [facility] June 2024), in outpt tx at [facility] AOP[facility], w/ PMHx HTN, HLD, hypothyroidism, and CVID/ hypogammaglobulinemia (on monthly IVIG - last doses 6/16/24 and 7/24/24), COPD not on home O2, hx of frequent skin infection who presented initially to [facility] for SA by overdose of labetalol related to paranoia and fear, found to have left leg abscess vs cellulitis and was transferred to [facility] on 7/22/24 for IVIG infusions (received 7/24/24), followed by psych CL, then medically cleared and transferred to [facility] on 7/26/24 for further psychiatric treatment.    No interval change. Psychosis remains much improved (pt consistently denying paranoia and AH), as does pt's mood (which is stable and euthymic). Continues to deny SIIP.  Received haldol dec 200mg IM this AM    Plan:  Psych:  - S/p Haldol Dec 200mg IM on 8/7/24 and 8/28/24 (switched from [id number] to [id number])  - C/w Cogentin 1mg qhs  - C/w Lexapro 10mg daily     Medical:  - CVID: IVIG infusion q3.5weeks (last 8/15)  - HTN: HAS NOT RECEIVED NORIVASC, LANETALOL AND FLOMAX THE LAST FEW DAYS BECAUSE OF LOWER BPS.  HE IS NOT RECEIVING THESE MEDS AT DISCHARGE.  HE HAS BEEN INSTRUCTED NOT TO USE THEM.  HE HAS A FOLLOW UP APPOINTMENT 8/3 WITH PCP DR. VINCENT AND WE ARE TRANSMITTING THIS INFORMATION TO HER.    - HLD: Lipitor 40mg qhs  - Hypothyroidism: Synthroid 50mcg daily  - Hyponatremia: NaCl tabs TID, BMP on 8/16 within normal limits  - BPH: HELD BECAUSE OF HTN CONCERNS AS ABOVE  - Prediabetes (with A1C 5.8): Metformin 500mg BID  - Left hip wound per wound care, no longer requires tx.  Left leg wound recommended ID FOLLOW- s/p treatment at [facility] with antibiotics (seen by ID and wound care), healing with continued wound care ID APPOINTMENT WITH DR. SANDERSON FOR R OCCIPITAL BOIL AND LEFT LEG WOUND.  NO   - Hand rash: improved, c/w cortisone BID prn  - Neck pain: resolved, lidocaine patch prn, s/p imaging on 8/9 with no acute findings    - Conjunctivitis: resolved, s/p course of Tobramycin drops      Dispo:  - Refer to [facility] program, ELIAN setting up home care and other services    Upon discharge f/u at [facility] [address] [facility] [phone]

## 2024-08-22 NOTE — BH PSYCHOLOGY - GROUP THERAPY NOTE - NSBHPSYCHOLPARTICIPCOMMENT_PSY_A_CORE FT
Patient arrived to the session on time; patient appeared attentive and interested in the group discussion.  Although the patient did not contribute spontaneously during the group session, patient was able to read from the worksheet when prompted. Patient was able to follow along when other group members spoke. Patient was able to engage with the  and interact with the other group members in an appropriate manner. 
Patient arrived to the session on time; patient appeared attentive and interested in the group discussion.  Although the patient did not contribute spontaneously during the group session, patient was able to read from the worksheet when prompted. (He had even gone back to his room in order to get his reading glasses for group.) Patient was able to follow along when other group members spoke. Patient was able to engage with the  and interact with the other group members in an appropriate manner. 
Patient arrived to the session on time; patient appeared attentive and interested in the group discussion.  Although the patient did not contribute spontaneously during the group session, patient was able to read from the worksheet when prompted. Patient was able to follow along when other group members spoke. Patient was able to engage with the  and interact with the other group members in an appropriate manner. 
Patient arrived on time for the session; patient was observed to be attentive and interested in the group discussion.  Although the patient did not contribute spontaneously or volunteer to read (he stated he did not have his reading glasses) during the group session, patient appeared able to follow along with the group discussion. Patient was able to follow along when other group members spoke. Patient was able to engage with the  and interact with other group members in an appropriate manner.
Patient arrived to the session on time (and with his reading glasses prepared); patient appeared attentive and interested in the group discussion.  Although the patient did not contribute spontaneously during the group session, patient was able to read from the worksheet when prompted. Patient was able to follow along when other group members spoke. Patient was able to engage with the  and interact with the other group members in an appropriate manner. 
Patient arrived to the session on time; patient appeared attentive and interested in the group discussion.  Although the patient did not contribute spontaneously during the group session, patient was able to read from the worksheet when prompted. Patient was able to follow along when other group members spoke. Patient was able to engage with the  and interact with the other group members in an appropriate manner. 
Patient arrived on time for the session; patient was observed to be attentive and interested in the group discussion.  Although the patient did not contribute spontaneously or volunteer to read during the group session, patient appeared able to follow along with the group discussion. Patient was able to follow along when other group members spoke. However, Patient left the group discussion early and did not return.

## 2024-08-22 NOTE — BH PSYCHOLOGY - GROUP THERAPY NOTE - NSPSYCHOLGRPBILLING_PSY_A_CORE
[id number] - Group Psychotherapy
[id number] - Group Psychotherapy
[id number] - Group Psychotherapy
[id number] - Group Psychotherapy
[id number] - Group Psychotherapy
[id number] - Group Psychotherapy
[id number] - Group Psychotherapy
[id number] - Group Psychotherapy
[id number] - Group Psychotherapy
[id number] - Group Psychotherapy
[id number] - Group Psychotherapy

## 2024-08-22 NOTE — BH INPATIENT PSYCHIATRY DISCHARGE NOTE - NSBHDCRISKMITIGATE_PSY_ALL_CORE
Safety planning/Referral to case management/Referral to health home/Family/Other social support involvement/Long acting injectable medication/Other

## 2024-08-22 NOTE — BH PSYCHOLOGY - GROUP THERAPY NOTE - TOKEN PULL-DIAGNOSIS
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

## 2024-08-22 NOTE — BH PSYCHOLOGY - GROUP THERAPY NOTE - NSPSYCHOLGRPGENPT_PSY_A_CORE FT
Patient attended the cognitive behavior therapy group session. Group session focused on the topic of anxiety.  Discussion revolved around the definitions of anxiety, fear, and panic attacks as well as how each presented itself physiologically. The exploration of strategies to reduce its negative effects and understanding of the panic cycle were also reviewed.  Group expectations and guidelines, as well as confidentiality and its limitations, were addressed. Principles of cognitive behavior therapy related to today's group topic were reviewed and discussed. Group members illustrated understanding of these concepts by giving personal examples based on their current experiences. Discussions stemmed from the group topics to the causal connection between thoughts, feelings, and behaviors.
Patient attended the psychology cognitive-behavior therapy group. The discussion was focused on the topic of behavioral activation. Group expectations, guidelines, confidentiality and its limitations were reviewed. The group began with a description of behavioral activation as a treatment technique to decrease avoidance and isolation. Group members then learned how to introduce activities gradually into daily schedules. Also discussed were methods to create a behavioral activation schedule to assist in improving mood and activity levels.  Group members demonstrated their understanding through active participation, discussion, and by asking clarifying questions. Discussion stemmed from the group's focus on the connection between thoughts, feelings and behaviors. Group members were provided with a handout describing the concepts and strategies discussed during group.
Patient attended the psychology cognitive-behavior therapy group. The discussion was focused on the topic of Tolerating Uncertainty. Group expectations, guidelines, confidentiality and its limitations were reviewed. The group began with defining intolerance of uncertainty. The concept of challenging the need for certainty and learning how to deliberately do uncertain things in order to acclimate to uncertainty was explained. The acronym APPLE was used as a strategy to tolerate uncertainty: Acknowledge, Pause, Pull Back, Let Go, and Explore. Group members demonstrated their understanding through active participation, discussion, and by asking clarifying questions. Discussion stemmed from the group's focus on the connection between thoughts, feelings and behaviors. Group members were provided with a handout describing the concepts and strategies discussed.
Patient attended the cognitive behavior therapy group session. Group focused on topics including depression. Specifically, group addressed symptoms of depression, causes of depression, and what can be done to combat depression. Normal depression (sadness) and major depression were distinguished between each other, describing its impact on individuals in different ways. Group expectations and guidelines, as well as confidentiality and its limitations, were addressed. Principles of cognitive behavior therapy related to today's group topic were reviewed and discussed. Group members illustrated understanding of these concepts by giving personal examples based on their current experiences. Discussions stemmed from the group topics to the causal connection between thoughts, feelings, and behaviors.
Patient attended the psychology cognitive-behavior therapy group. The discussion was focused on the topic of Memory Training. Group expectations, guidelines, confidentiality and its limitations were reviewed. The group began with a description of memory difficulties as well as the definitions of attention and concentration. Group members then learned about external and internal strategies to improve both attention and concentration. Also discussed were other factors that impact memory performance (such as nutrition, exercise, and mental stimulation).  Group members demonstrated their understanding through active participation, discussion, and by asking clarifying questions. Discussion stemmed from the group's focus on the connection between thoughts, feelings and behaviors. Group members were provided with a handout describing the concepts and strategies discussed during group.
Patient attended the psychology cognitive-behavior therapy group. The discussion was focused on the topic of Convert Modeling. Group expectations, guidelines, confidentiality and its limitations were reviewed. The group began with defining the term convert modeling then outlining several examples. Group members then learned about how to visualize and practice convert modeling techniques as well as its implementation.  Group members demonstrated their understanding through active participation, discussion, and by asking clarifying questions. Discussion stemmed from the group's focus on the connection between thoughts, feelings and behaviors. Group members were provided with a handout describing the concepts and strategies discussed.
Patient attended the cognitive behavior therapy (CBT) group session.  Group focused on the topic of depression including the relationship between mood and its impact on everyday life events.  Patient was encouraged to list social interactions as well as write down useful and pleasant activities to assist in replacing negative thoughts that may have hindered progress in the past.  Group expectations and guidelines (as well as confidentiality and its limitations) were addressed.  Principles of cognitive behavior therapy related to today's group topic were reviewed.  Group members illustrated understanding of these concepts by giving personal examples based on their current experiences.  Discussions stemmed from the group topics to the causal connection between thoughts, feelings, and behaviors.
Patient attended the cognitive behavior therapy group session. Group session focused on the stages of change.  Discussion revolved around learning how to navigate the stages of Precontemplation, Contemplation, Preparation, Action, and Maintenance. Techniques focusing on learning about the different aspects of each stage.  Strategies for change as well as reviewing the possibilities of relapse were also discussed.  Group expectations and guidelines, as well as confidentiality and its limitations, were addressed. Principles of cognitive behavior therapy related to today's group topic were reviewed and discussed. Group members illustrated understanding of these concepts by giving personal examples based on their current experiences. Discussions stemmed from the group topics to the causal connection between thoughts, feelings, and behaviors.
Patient attended the cognitive behavior therapy group session. Group session focused on the topic of rational thinking.  Discussion revolved around the recognition of situations as well as the emotional response that stems from distorted/negative self-talk.  The strategies of thought stopping and reframing were explored during the discussion.  Group expectations and guidelines, as well as confidentiality and its limitations, were addressed. Principles of cognitive behavior therapy related to today's group topic were reviewed and discussed.  Group members illustrated understanding of these concepts by giving personal examples based on their current experiences. Discussions stemmed from the group topics to the causal connection between thoughts, feelings, and behaviors.
Patient attended the cognitive behavior therapy group session. Group session focused on the topic of self-esteem.  Discussion revolved around the identification of low self-esteem using an inventory to isolate one to four areas of self-esteem.  Group expectations and guidelines, as well as confidentiality and its limitations, were addressed. Principles of cognitive behavior therapy related to today's group topic were reviewed and discussed. Group members illustrated understanding of these concepts by giving personal examples based on their current experiences. Discussions stemmed from the group topics to the causal connection between thoughts, feelings, and behaviors.
Patient attended the cognitive behavior therapy group session. Group focused on topics including understanding the importance of sleep, factors that can play a role in sleep difficulties, and tips for improving sleep. Group expectations and guidelines, as well as confidentiality and its limitations, were addressed. Principles of cognitive behavior therapy related to today's group topic were reviewed and discussed.  Group members illustrated understanding of these concepts by giving personal examples based on their current experiences. Discussions stemmed from the group topics to the causal connection between thoughts, feelings, and behaviors.

## 2024-08-22 NOTE — BH PSYCHOLOGY - GROUP THERAPY NOTE - NSBHPTASSESSDT_PSY_A_CORE
14-Aug-2024 11:00
09-Aug-2024 11:00
22-Aug-2024 11:00
06-Aug-2024 11:00
21-Aug-2024 11:00
30-Jul-2024 11:00
19-Aug-2024 11:00
20-Aug-2024 11:00
05-Aug-2024 11:00
07-Aug-2024 11:00
13-Aug-2024 11:00
Yes

## 2024-08-22 NOTE — BH INPATIENT PSYCHIATRY DISCHARGE NOTE - NSBHFUPINTERVALHXFT_PSY_A_CORE
Pt is seen and evaluated.  Chart is reviewed and case is d/w treatment team.  No acute events overnight or over the weekend. Pt slept well, compliant with meds. Remains engaged in treatment and attending most groups. Remains with good/stable mood. Denies SIIP. Denies AH, intrusive thoughts, or other psychotic symptoms. Pt to receive Haldol Dec 200mg next dose on 8/28, now q3 weeks, with plans ongoing for discharge planning on 8/28 following.  Meds submitted to [facility] as well.    See below wound care assessment:  Left thigh wound evaluated today on 8/26 and although improved requires continued monitoring via home care, 3 times per week, as an outpatient.  Denies pain or discomfort at this time.  In summary patient with h/o of recurring MRSA abscesses including right buttock, perineum, left leg.  Initial evaluation noted by wound care RN reviewed from 7/29.  Pt with a hx of frequent skin infection (MRSA abscess / cellulitis w/ MRSA bacteremia s/p debridement 6/2023).    On initial wound assessment on 7/29 left thigh wound measuring 1.5 cm x 1 cm x 0.02 cm no undermining, no tunneling, wound bed with 50% tan-moist translucent fibrin film. Minimal drainage, erythema, and induration. MRSA and Staph aureus PCR -resulted not detected.  Left proximal leg- Healing wound measuring 2.5 cm x 2.5 cm skin intact no drainage, mild erythema.  Please continue wound care recommendations:  Topical recommendations for left thigh wound- cleanse wound and Periwound skin with NS. Apply Liquid barrier film to Periwound skin. Apply Medihoney gel to wound base, cover with Allevyn dressing and change daily and prn if soiled/compromised.  Left leg wound- Keep open to air.  Monitor for S&S of infections.    Upon discharge f/u at [facility] [address] [facility] [phone]   No interval events, compliant with meds, slept.  Continues to deny si/hi intents or plans, reports feeling better, no psychosis.  His  norvasc, labetalol and flomax were held because of low bp paramenters.  Discussed with medicine.  We made him an appointment with his pcp terrance mcclendon at [phone] .  In house medicine (Dr. Emery) saw patient and reports okay to hold norvasc, labetalol and flomax until he sees Dr. Johnstonday on 8/3, faxing her Dr. Lewis note.  We are not sending him home with flomax, norvasc or labetalol and we told him to refrain from taking these meds until sees dr. Mcclendon.  Seen by woundcare who reports will need FU appointment with ID for occipital boil, left leg wound.  No additional treatment for left hip wound, continue cortisone for right hand eczema.       Dr. Emery Note transmitted to Dr. Mcclendon office at [address] [place] [address] ([phone]) 03-Sep-2024 13:00 and Wound care note transmitted to Dr. Love/ID at [address] [place] [address] [phone].

## 2024-08-22 NOTE — BH PSYCHOLOGY - GROUP THERAPY NOTE - NSBHPSYCHOLGRPTYPE_PSY_A_CORE
General Group Therapy

## 2024-08-22 NOTE — BH INPATIENT PSYCHIATRY PROGRESS NOTE - NSBHFUPINTERVALHXFT_PSY_A_CORE
No acute events overnight. Pt slept well, compliant with meds. Remains engaged in treatment and attending most groups. Remains with good/stable mood. Denies SIIP. Denies AH, intrusive thoughts, or other psychotic symptoms.

## 2024-08-22 NOTE — BH INPATIENT PSYCHIATRY PROGRESS NOTE - NSBHASSESSSUMMFT_PSY_ALL_CORE
Pt is 57yo single man with PPHx of dx schoizoaffective d/o, hx of many prior hospitalizations (last at [facility] June 2024), in outpt tx at [facility] AOPD, w/ PMHx HTN, HLD, hypothyroidism, and CVID/ hypogammaglobulinemia (on monthly IVIG - last doses 6/16/24 and 7/24/24), COPD not on home O2, hx of frequent skin infection who presented initially to [facility] for SA by overdose of labetalol related to paranoia and fear, found to have left leg abscess vs cellulitis and was transferred to [facility] on 7/22/24 for IVIG infusions (received 7/24/24), followed by psych CL, then medically cleared and transferred to [facility] on 7/26/24 for further psychiatric treatment.    No interval change. Psychosis remains much improved (pt consistently denying paranoia and AH), as does pt's mood (which is stable and euthymic). Continues to deny SIIP.     Plan:  Psych:  - S/p Haldol Dec 200mg IM on 8/7/24, next due 8/28/24 (switched from [id number] to [id number])  - C/w Cogentin 1mg qhs  - C/w Lexapro 10mg daily     Medical:  - CVID: IVIG infusion q3.5weeks (last 8/15)  - HTN: Norvasc 10mg daily, Labetalol 200mg BID   - HLD: Lipitor 40mg qhs  - Hypothyroidism: Synthroid 50mcg daily  - Hyponatremia: NaCl tabs TID, BMP on 8/16 within normal limits  - BPH: Flomax 0.4mg qhs  - Prediabetes (with A1C 5.8): Metformin 500mg BID  - Left thigh wound - s/p treatment at [facility] with antibiotics (seen by ID and wound care), healing with continued wound care  - Hand rash: improved, c/w cortisone BID prn  - Neck pain: resolved, lidocaine patch prn, s/p imaging on 8/9 with no acute findings    - Conjunctivitis: resolved, s/p course of Tobramycin drops      Dispo:  - s/p family meeting w/ sisters, housing, and  on 8/16  - Refer to [facility] program, ELIAN setting up home care and other services

## 2024-08-22 NOTE — BH INPATIENT PSYCHIATRY DISCHARGE NOTE - NSDCMRMEDTOKEN_GEN_ALL_CORE_FT
amLODIPine 10 mg oral tablet: 1 tab(s) orally once a day  atorvastatin 40 mg oral tablet: 1 tab(s) orally once a day (at bedtime)  Haldol Decanoate 100 mg/mL intramuscular solution: 200 milligram(s) intramuscularly every 3 weeks Last administered on 8/28/24; NEXT DUE 9/18/24  haloperidol 5 mg oral tablet: 1 tab(s) orally once a day  hydrocortisone 1% topical cream: 1 Apply topically to affected area 2 times a day As needed rash on fingers  insulin lispro 100 units/mL injectable solution: 1 unit(s) injectable once a day (at bedtime) 0 Units if glucose 0-250  1 Unit if glucose 251-300  2 Units if glucose 301-350  3 Units if glucose 351-400  insulin lispro 100 units/mL injectable solution: 1 unit(s) injectable 3 times a day (before meals) 1 Unit if glucose 151-200  2 Unit if glucose 201-250  3 Unit if glucose 251-300  4 Unit if glucose 301-350  5 Unit if glucose 351-400  labetalol 200 mg oral tablet: 1 tab(s) orally 2 times a day  levothyroxine 50 mcg (0.05 mg) oral tablet: 1 tab(s) orally once a day  melatonin 3 mg oral tablet: 1 tab(s) orally once a day (at bedtime) As needed Insomnia  polyethylene glycol 3350 oral powder for reconstitution: 17 gram(s) orally once a day As needed constipation  sodium chloride 1 g oral tablet: 1 tab(s) orally 3 times a day  tamsulosin 0.4 mg oral capsule: 1 cap(s) orally once a day (at bedtime)   benztropine 1 mg oral tablet: 1 tab(s) orally once a day (at bedtime)  Flomax 0.4 mg oral capsule: 1 cap(s) orally once a day (at bedtime)  Haldol Decanoate 100 mg/mL intramuscular solution: 200 milligram(s) intramuscularly every 3 weeks Last administered on 8/28/24; NEXT DUE 9/18/24  hydrocortisone 1% topical cream: 1 Apply topically to affected area 2 times a day As needed rash on fingers  labetalol 200 mg oral tablet: 1 tab(s) orally 2 times a day  Lexapro 10 mg oral tablet: 1 tab(s) orally once a day  Lipitor 40 mg oral tablet: 1 tab(s) orally once a day (at bedtime)  melatonin 3 mg oral tablet: 1 tab(s) orally once a day (at bedtime) As needed Insomnia  metFORMIN 500 mg oral tablet: 1 tab(s) orally 2 times a day  Norvasc 10 mg oral tablet: 1 tab(s) orally once a day  polyethylene glycol 3350 oral powder for reconstitution: 17 gram(s) orally once a day As needed constipation  Sodium Chloride 1 g oral tablet: 1 tab(s) orally 3 times a day  Synthroid 50 mcg (0.05 mg) oral tablet: 1 tab(s) orally once a day

## 2024-08-22 NOTE — BH PSYCHOLOGY - GROUP THERAPY NOTE - NSPSYCHOLGRPGENGOAL_PSY_A_CORE
assessment/decrease symptoms/improve level of independent functioning/improve social/vocational/coping skills/psychoeducation/treatment compliance

## 2024-08-22 NOTE — BH PSYCHOLOGY - GROUP THERAPY NOTE - NSBHPSYCHOLPARTICIP_PSY_A_CORE
Partially engaged
Fully engaged

## 2024-08-22 NOTE — BH INPATIENT PSYCHIATRY DISCHARGE NOTE - NSBHDCMEDICALFT_PSY_A_CORE
- CVID: IVIG infusion q3.5weeks (last 8/15)  - HTN: Norvasc 10mg daily, Labetalol 200mg BID   - HLD: Lipitor 40mg qhs  - Hypothyroidism: Synthroid 50mcg daily  - Hyponatremia: NaCl tabs TID, BMP on 8/16 within normal limits  - BPH: Flomax 0.4mg qhs  - Prediabetes (with A1C 5.8): Metformin 500mg BID  - Left thigh wound - s/p treatment at [facility] with antibiotics (seen by ID and wound care), healing with continued wound care  - Hand rash: improved, c/w cortisone BID prn  - Neck pain: resolved, lidocaine patch prn, s/p imaging on 8/9 with no acute findings    - Conjunctivitis: resolved, s/p course of Tobramycin drops     Plan:  Psych:  - S/p Haldol Dec 200mg IM on 8/7/24 and 8/28/24 (switched from [id number] to [id number])  - C/w Cogentin 1mg qhs  - C/w Lexapro 10mg daily     Medical:  - CVID: IVIG infusion q3.5weeks (last 8/15)  - HTN: HAS NOT RECEIVED NORIVASC, LANETALOL AND FLOMAX THE LAST FEW DAYS BECAUSE OF LOWER BPS.  HE IS NOT RECEIVING THESE MEDS AT DISCHARGE.  HE HAS BEEN INSTRUCTED NOT TO USE THEM.  HE HAS A FOLLOW UP APPOINTMENT 8/3 WITH PCP DR. MCCLENDON AND WE ARE TRANSMITTING THIS INFORMATION TO HER.    - HLD: Lipitor 40mg qhs  - Hypothyroidism: Synthroid 50mcg daily  - Hyponatremia: NaCl tabs TID, BMP on 8/16 within normal limits  - BPH: HELD BECAUSE OF HTN CONCERNS AS ABOVE  - Prediabetes (with A1C 5.8): Metformin 500mg BID  - Left hip wound per wound care, no longer requires tx.  Left leg wound recommended ID FOLLOW- s/p treatment at [facility] with antibiotics (seen by ID and wound care), healing with continued wound care ID APPOINTMENT WITH DR. SANDERSON FOR R OCCIPITAL BOIL AND LEFT LEG WOUND.  NO   - Hand rash: improved, c/w cortisone BID prn  - Neck pain: resolved, lidocaine patch prn, s/p imaging on 8/9 with no acute findings    - Conjunctivitis: resolved, s/p course of Tobramycin drops        Dr. Emery Note transmitted to Dr. Mcclendon office at [address] [place] [address] ([phone]) 03-Sep-2024 13:00 and Wound care note transmitted to Dr. Sanderson/ID at [address] [place] [address] [phone].        Dispo:  - Refer to [facility]ACE program, ELIAN setting up home care and other services    Upon discharge f/u at [facility] [address] [place] [address] [phone]

## 2024-08-22 NOTE — BH INPATIENT PSYCHIATRY DISCHARGE NOTE - NSDCRECOMMENDMEDICALFT_PSY_ALL_CORE
Follow-up with IVIG infusions as per Dr. Boxer (you're most recent infusion was 8/15)  Follow-up with wound care via health home

## 2024-08-23 PROCEDURE — 99232 SBSQ HOSP IP/OBS MODERATE 35: CPT

## 2024-08-23 RX ADMIN — Medication 50 MILLIGRAM(S): at 20:42

## 2024-08-23 RX ADMIN — AMLODIPINE BESYLATE 10 MILLIGRAM(S): 10 TABLET ORAL at 15:34

## 2024-08-23 RX ADMIN — Medication 50 MICROGRAM(S): at 06:28

## 2024-08-23 RX ADMIN — Medication 200 MILLIGRAM(S): at 20:42

## 2024-08-23 RX ADMIN — SODIUM CHLORIDE 1 GRAM(S): 9 INJECTION INTRAMUSCULAR; INTRAVENOUS; SUBCUTANEOUS at 15:54

## 2024-08-23 RX ADMIN — ESCITALOPRAM OXALATE 10 MILLIGRAM(S): 10 TABLET ORAL at 15:34

## 2024-08-23 RX ADMIN — Medication 30 MILLILITER(S): at 15:34

## 2024-08-23 RX ADMIN — Medication 200 MILLIGRAM(S): at 15:35

## 2024-08-23 RX ADMIN — BENZTROPINE MESYLATE 1 MILLIGRAM(S): 2 TABLET ORAL at 20:41

## 2024-08-23 RX ADMIN — TAMSULOSIN HYDROCHLORIDE 0.4 MILLIGRAM(S): 0.4 CAPSULE ORAL at 20:42

## 2024-08-23 RX ADMIN — METFORMIN HYDROCHLORIDE 500 MILLIGRAM(S): 850 TABLET, FILM COATED ORAL at 15:35

## 2024-08-23 RX ADMIN — Medication 80 MILLIGRAM(S): at 21:45

## 2024-08-23 RX ADMIN — METFORMIN HYDROCHLORIDE 500 MILLIGRAM(S): 850 TABLET, FILM COATED ORAL at 20:42

## 2024-08-23 RX ADMIN — SODIUM CHLORIDE 1 GRAM(S): 9 INJECTION INTRAMUSCULAR; INTRAVENOUS; SUBCUTANEOUS at 20:42

## 2024-08-23 RX ADMIN — SODIUM CHLORIDE 1 GRAM(S): 9 INJECTION INTRAMUSCULAR; INTRAVENOUS; SUBCUTANEOUS at 12:38

## 2024-08-23 RX ADMIN — Medication 40 MILLIGRAM(S): at 20:42

## 2024-08-23 NOTE — BH INPATIENT PSYCHIATRY PROGRESS NOTE - NSBHFUPINTERVALHXFT_PSY_A_CORE
Pt is seen and evaluated.  Chart is reviewed and case is d/w treatment team.  No acute events overnight. Pt slept well, compliant with meds. Remains engaged in treatment and attending most groups. Remains with good/stable mood. Denies SIIP. Denies AH, intrusive thoughts, or other psychotic symptoms.

## 2024-08-23 NOTE — BH INPATIENT PSYCHIATRY PROGRESS NOTE - NSBHASSESSSUMMFT_PSY_ALL_CORE
Pt is 59yo single man with PPHx of dx schoizoaffective d/o, hx of many prior hospitalizations (last at [facility] June 2024), in outpt tx at [facility] AOPD, w/ PMHx HTN, HLD, hypothyroidism, and CVID/ hypogammaglobulinemia (on monthly IVIG - last doses 6/16/24 and 7/24/24), COPD not on home O2, hx of frequent skin infection who presented initially to [facility] for SA by overdose of labetalol related to paranoia and fear, found to have left leg abscess vs cellulitis and was transferred to [facility] on 7/22/24 for IVIG infusions (received 7/24/24), followed by psych CL, then medically cleared and transferred to [facility] on 7/26/24 for further psychiatric treatment.    No interval change. Psychosis remains much improved (pt consistently denying paranoia and AH), as does pt's mood (which is stable and euthymic). Continues to deny SIIP.     Plan:  Psych:  - S/p Haldol Dec 200mg IM on 8/7/24, next due 8/28/24 (switched from [id number] to [id number])  - C/w Cogentin 1mg qhs  - C/w Lexapro 10mg daily     Medical:  - CVID: IVIG infusion q3.5weeks (last 8/15)  - HTN: Norvasc 10mg daily, Labetalol 200mg BID   - HLD: Lipitor 40mg qhs  - Hypothyroidism: Synthroid 50mcg daily  - Hyponatremia: NaCl tabs TID, BMP on 8/16 within normal limits  - BPH: Flomax 0.4mg qhs  - Prediabetes (with A1C 5.8): Metformin 500mg BID  - Left thigh wound - s/p treatment at [facility] with antibiotics (seen by ID and wound care), healing with continued wound care  - Hand rash: improved, c/w cortisone BID prn  - Neck pain: resolved, lidocaine patch prn, s/p imaging on 8/9 with no acute findings    - Conjunctivitis: resolved, s/p course of Tobramycin drops      Dispo:  - s/p family meeting w/ sisters, housing, and  on 8/16  - Refer to PACE program, ELIAN setting up home care and other services

## 2024-08-23 NOTE — BH INPATIENT PSYCHIATRY PROGRESS NOTE - NSBHMETABOLIC_PSY_ALL_CORE_FT
BMI: BMI (kg/m2): 31.6 (08-20-24 @ 08:19)  HbA1c: A1C with Estimated Average Glucose Result: 5.8 % (07-24-24 @ 07:48)    Glucose: POCT Blood Glucose.: 120 mg/dL (08-13-24 @ 11:33)    BP: 117/76 (08-20-24 @ 20:47) (117/76 - 117/76)Vital Signs Last 24 Hrs  T(C): 36.7 (08-23-24 @ 08:25), Max: 36.8 (08-22-24 @ 20:26)  T(F): 98.1 (08-23-24 @ 08:25), Max: 98.2 (08-22-24 @ 20:26)  HR: --  BP: --  BP(mean): --  RR: --  SpO2: --    Orthostatic VS  08-23-24 @ 08:25  Lying BP: --/-- HR: --  Sitting BP: 123/76 HR: 64  Standing BP: 107/66 HR: 82  Site: --  Mode: --  Orthostatic VS  08-22-24 @ 20:26  Lying BP: --/-- HR: --  Sitting BP: 123/64 HR: 61  Standing BP: 120/70 HR: 71  Site: upper left arm  Mode: electronic  Orthostatic VS  08-22-24 @ 08:30  Lying BP: --/-- HR: --  Sitting BP: 131/72 HR: 60  Standing BP: 114/58 HR: 71  Site: --  Mode: --  Orthostatic VS  08-21-24 @ 19:02  Lying BP: --/-- HR: --  Sitting BP: 133/72 HR: 67  Standing BP: 121/63 HR: 68  Site: --  Mode: --    Lipid Panel: Date/Time: 07-24-24 @ 07:48  Cholesterol, Serum: 81  LDL Cholesterol Calculated: 34  HDL Cholesterol, Serum: 30  Total Cholesterol/HDL Ration Measurement: --  Triglycerides, Serum: 84

## 2024-08-24 RX ADMIN — ESCITALOPRAM OXALATE 10 MILLIGRAM(S): 10 TABLET ORAL at 08:16

## 2024-08-24 RX ADMIN — BENZTROPINE MESYLATE 1 MILLIGRAM(S): 2 TABLET ORAL at 20:21

## 2024-08-24 RX ADMIN — METFORMIN HYDROCHLORIDE 500 MILLIGRAM(S): 850 TABLET, FILM COATED ORAL at 20:21

## 2024-08-24 RX ADMIN — Medication 50 MILLIGRAM(S): at 20:24

## 2024-08-24 RX ADMIN — Medication 200 MILLIGRAM(S): at 20:21

## 2024-08-24 RX ADMIN — SODIUM CHLORIDE 1 GRAM(S): 9 INJECTION INTRAMUSCULAR; INTRAVENOUS; SUBCUTANEOUS at 20:21

## 2024-08-24 RX ADMIN — Medication 50 MICROGRAM(S): at 05:06

## 2024-08-24 RX ADMIN — SODIUM CHLORIDE 1 GRAM(S): 9 INJECTION INTRAMUSCULAR; INTRAVENOUS; SUBCUTANEOUS at 08:16

## 2024-08-24 RX ADMIN — SODIUM CHLORIDE 1 GRAM(S): 9 INJECTION INTRAMUSCULAR; INTRAVENOUS; SUBCUTANEOUS at 12:24

## 2024-08-24 RX ADMIN — AMLODIPINE BESYLATE 10 MILLIGRAM(S): 10 TABLET ORAL at 08:16

## 2024-08-24 RX ADMIN — TAMSULOSIN HYDROCHLORIDE 0.4 MILLIGRAM(S): 0.4 CAPSULE ORAL at 20:21

## 2024-08-24 RX ADMIN — Medication 40 MILLIGRAM(S): at 20:21

## 2024-08-24 RX ADMIN — METFORMIN HYDROCHLORIDE 500 MILLIGRAM(S): 850 TABLET, FILM COATED ORAL at 08:16

## 2024-08-24 RX ADMIN — Medication 200 MILLIGRAM(S): at 08:16

## 2024-08-25 RX ADMIN — Medication 50 MICROGRAM(S): at 05:12

## 2024-08-25 RX ADMIN — Medication 50 MILLIGRAM(S): at 20:10

## 2024-08-25 RX ADMIN — ESCITALOPRAM OXALATE 10 MILLIGRAM(S): 10 TABLET ORAL at 08:41

## 2024-08-25 RX ADMIN — TAMSULOSIN HYDROCHLORIDE 0.4 MILLIGRAM(S): 0.4 CAPSULE ORAL at 20:10

## 2024-08-25 RX ADMIN — METFORMIN HYDROCHLORIDE 500 MILLIGRAM(S): 850 TABLET, FILM COATED ORAL at 08:41

## 2024-08-25 RX ADMIN — Medication 200 MILLIGRAM(S): at 08:41

## 2024-08-25 RX ADMIN — POLYETHYLENE GLYCOL 3350 17 GRAM(S): 17 POWDER, FOR SOLUTION ORAL at 10:01

## 2024-08-25 RX ADMIN — SODIUM CHLORIDE 1 GRAM(S): 9 INJECTION INTRAMUSCULAR; INTRAVENOUS; SUBCUTANEOUS at 13:30

## 2024-08-25 RX ADMIN — Medication 40 MILLIGRAM(S): at 20:10

## 2024-08-25 RX ADMIN — AMLODIPINE BESYLATE 10 MILLIGRAM(S): 10 TABLET ORAL at 08:41

## 2024-08-25 RX ADMIN — SODIUM CHLORIDE 1 GRAM(S): 9 INJECTION INTRAMUSCULAR; INTRAVENOUS; SUBCUTANEOUS at 08:41

## 2024-08-25 RX ADMIN — BENZTROPINE MESYLATE 1 MILLIGRAM(S): 2 TABLET ORAL at 20:10

## 2024-08-25 RX ADMIN — SODIUM CHLORIDE 1 GRAM(S): 9 INJECTION INTRAMUSCULAR; INTRAVENOUS; SUBCUTANEOUS at 20:10

## 2024-08-25 RX ADMIN — METFORMIN HYDROCHLORIDE 500 MILLIGRAM(S): 850 TABLET, FILM COATED ORAL at 20:10

## 2024-08-26 PROCEDURE — 99232 SBSQ HOSP IP/OBS MODERATE 35: CPT

## 2024-08-26 RX ORDER — ESCITALOPRAM OXALATE 10 MG/1
1 TABLET ORAL
Qty: 30 | Refills: 0
Start: 2024-08-26 | End: 2024-09-24

## 2024-08-26 RX ORDER — LEVOTHYROXINE SODIUM 100 MCG
1 TABLET ORAL
Qty: 30 | Refills: 0
Start: 2024-08-26 | End: 2024-09-24

## 2024-08-26 RX ORDER — SODIUM CHLORIDE 9 MG/ML
1 INJECTION INTRAMUSCULAR; INTRAVENOUS; SUBCUTANEOUS
Qty: 90 | Refills: 0
Start: 2024-08-26 | End: 2024-09-24

## 2024-08-26 RX ORDER — HALOPERIDOL 1 MG
200 TABLET ORAL ONCE
Refills: 0 | Status: COMPLETED | OUTPATIENT
Start: 2024-08-28 | End: 2024-08-28

## 2024-08-26 RX ORDER — TAMSULOSIN HYDROCHLORIDE 0.4 MG/1
1 CAPSULE ORAL
Qty: 30 | Refills: 0
Start: 2024-08-26 | End: 2024-09-24

## 2024-08-26 RX ORDER — AMLODIPINE BESYLATE 10 MG/1
1 TABLET ORAL
Qty: 30 | Refills: 0
Start: 2024-08-26 | End: 2024-09-24

## 2024-08-26 RX ORDER — METFORMIN HYDROCHLORIDE 850 MG/1
1 TABLET, FILM COATED ORAL
Qty: 60 | Refills: 0
Start: 2024-08-26 | End: 2024-09-24

## 2024-08-26 RX ORDER — BENZTROPINE MESYLATE 2 MG/1
1 TABLET ORAL
Qty: 30 | Refills: 0
Start: 2024-08-26 | End: 2024-09-24

## 2024-08-26 RX ADMIN — TAMSULOSIN HYDROCHLORIDE 0.4 MILLIGRAM(S): 0.4 CAPSULE ORAL at 20:15

## 2024-08-26 RX ADMIN — AMLODIPINE BESYLATE 10 MILLIGRAM(S): 10 TABLET ORAL at 09:33

## 2024-08-26 RX ADMIN — SODIUM CHLORIDE 1 GRAM(S): 9 INJECTION INTRAMUSCULAR; INTRAVENOUS; SUBCUTANEOUS at 20:15

## 2024-08-26 RX ADMIN — SODIUM CHLORIDE 1 GRAM(S): 9 INJECTION INTRAMUSCULAR; INTRAVENOUS; SUBCUTANEOUS at 08:24

## 2024-08-26 RX ADMIN — Medication 50 MICROGRAM(S): at 05:18

## 2024-08-26 RX ADMIN — METFORMIN HYDROCHLORIDE 500 MILLIGRAM(S): 850 TABLET, FILM COATED ORAL at 08:24

## 2024-08-26 RX ADMIN — SODIUM CHLORIDE 1 GRAM(S): 9 INJECTION INTRAMUSCULAR; INTRAVENOUS; SUBCUTANEOUS at 12:18

## 2024-08-26 RX ADMIN — BENZTROPINE MESYLATE 1 MILLIGRAM(S): 2 TABLET ORAL at 20:15

## 2024-08-26 RX ADMIN — ESCITALOPRAM OXALATE 10 MILLIGRAM(S): 10 TABLET ORAL at 08:24

## 2024-08-26 RX ADMIN — Medication 200 MILLIGRAM(S): at 09:33

## 2024-08-26 RX ADMIN — METFORMIN HYDROCHLORIDE 500 MILLIGRAM(S): 850 TABLET, FILM COATED ORAL at 20:15

## 2024-08-26 RX ADMIN — Medication 40 MILLIGRAM(S): at 20:15

## 2024-08-26 NOTE — BH DISCHARGE NOTE NURSING/SOCIAL WORK/PSYCH REHAB - NSDCPRGOAL_PSY_ALL_CORE
Over the course of the current hospitalization, Psychiatric Rehabilitation Staff and patient discussed level of functioning, addressed concerns surrounding the hospitalization, discharge, engaged in skill development and safety planning. Patient met specified goal of attending two groups per day to promote positive socialization with peers. Progress was evidenced by patient’s regular attendance and active participation in group over the past seven days. Patient completed a safety plan upon discharge.

## 2024-08-26 NOTE — BH DISCHARGE NOTE NURSING/SOCIAL WORK/PSYCH REHAB - MODE OF TRANSPORTATION
Medicaid Transportation mas transportation:  at 11am Invoice [id number]  [place] [id number]/Medicaid Transportation

## 2024-08-26 NOTE — BH DISCHARGE NOTE NURSING/SOCIAL WORK/PSYCH REHAB - NSDCNEXTLEVEL_PSY_ALL_CORE
Subjective cc: chronic knee pain  Roscoe Frias is a 54 y.o. male who presents via mychart video visit for follow up on chronic pain medication.     He is present at home, I am present in home office.     He continues to have severe knee pain - plan for knee replacement when can be cleared by cardiology. At this time we are providing pain medication to allow him some relief and to function. He is starting to have some lower back pain.      History of Present Illness     The following portions of the patient's history were reviewed and updated as appropriate: allergies, current medications, past family history, past medical history, past social history, past surgical history and problem list.        Review of Systems    Objective   There were no vitals taken for this visit.  Physical Exam  Constitutional:       Appearance: Normal appearance.   Pulmonary:      Effort: Pulmonary effort is normal. No respiratory distress.   Musculoskeletal:         General: Tenderness present.   Neurological:      Mental Status: He is alert and oriented to person, place, and time. Mental status is at baseline.   Psychiatric:         Mood and Affect: Mood normal.         Behavior: Behavior normal.         Thought Content: Thought content normal.         Judgment: Judgment normal.         Assessment & Plan   Problems Addressed this Visit        Musculoskeletal and Injuries    Chronic pain of right knee    Relevant Medications    HYDROcodone-acetaminophen (NORCO) 7.5-325 MG per tablet   Diagnoses       Codes Comments    Chronic pain of right knee     ICD-10-CM: M25.561, G89.29  ICD-9-CM: 719.46, 338.29         PLAN:     #1 chronic right knee pain: chronic, stable, refill given on medication, advised on risks/benefits previously, roxana reviewed, controlled contract in chart, recheck in 1 month     10 minutes           This document has been electronically signed by Barbara Waters MD on January 31, 2023 10:06 CST             
Yes

## 2024-08-26 NOTE — BH INPATIENT PSYCHIATRY PROGRESS NOTE - NSBHASSESSSUMMFT_PSY_ALL_CORE
Pt is 57yo single man with PPHx of dx schoizoaffective d/o, hx of many prior hospitalizations (last at [facility] June 2024), in outpt tx at [facility] AOPD, w/ PMHx HTN, HLD, hypothyroidism, and CVID/ hypogammaglobulinemia (on monthly IVIG - last doses 6/16/24 and 7/24/24), COPD not on home O2, hx of frequent skin infection who presented initially to [facility] for SA by overdose of labetalol related to paranoia and fear, found to have left leg abscess vs cellulitis and was transferred to [facility] on 7/22/24 for IVIG infusions (received 7/24/24), followed by psych CL, then medically cleared and transferred to [facility] on 7/26/24 for further psychiatric treatment.    No interval change. Psychosis remains much improved (pt consistently denying paranoia and AH), as does pt's mood (which is stable and euthymic). Continues to deny SIIP.  Scheduled for Haldol Dec 200mg BELLA on 8/28, e-scripts sent to [facility].  Requires continued home care as noted.    Plan:  Psych:  - S/p Haldol Dec 200mg IM on 8/7/24, next due 8/28/24 (switched from [id number] to [id number])  - C/w Cogentin 1mg qhs  - C/w Lexapro 10mg daily     Medical:  - CVID: IVIG infusion q3.5weeks (last 8/15)  - HTN: Norvasc 10mg daily, Labetalol 200mg BID   - HLD: Lipitor 40mg qhs  - Hypothyroidism: Synthroid 50mcg daily  - Hyponatremia: NaCl tabs TID, BMP on 8/16 within normal limits  - BPH: Flomax 0.4mg qhs  - Prediabetes (with A1C 5.8): Metformin 500mg BID  - Left thigh wound - s/p treatment at [facility] with antibiotics (seen by ID and wound care), healing with continued wound care  - Hand rash: improved, c/w cortisone BID prn  - Neck pain: resolved, lidocaine patch prn, s/p imaging on 8/9 with no acute findings    - Conjunctivitis: resolved, s/p course of Tobramycin drops      Dispo:  - s/p family meeting w/ sisters, housing, and  on 8/16  - Refer to PACE program, ELIAN setting up home care and other services    Upon discharge f/u at [facility] [address] [facility] [phone]

## 2024-08-26 NOTE — BH DISCHARGE NOTE NURSING/SOCIAL WORK/PSYCH REHAB - NSDCPRRECOMMEND_PSY_ALL_CORE
Upon discharge, it is recommended that patient utilizing and expanding inventory of coping skills and  utilize outpatient treatment to sustain noted improvement.

## 2024-08-26 NOTE — BH DISCHARGE NOTE NURSING/SOCIAL WORK/PSYCH REHAB - NSDCADDINFO1FT_PSY_ALL_CORE
[facility] homecare: Mathew GEORGE transportation: arranged for PACE program   [facility] homecare: Mathew GEORGE transportation: arranged for PACE program: Garrett Cristina is scheduled for a standing appointment at 1:30pm starting 09/04/2024. This appointment repeats on Wednesday of every week until 09/18/2024.  At 12:30pm [facility] will  Garrett Cristina at [address] and take them to [address] , [place]  At 3:00pm [facility] will  Garrett Cristina at [address], [place] and take them to [address] [place] [address]     [facility] homecare: Mathew Ramos [phone]    [facility] transportation: arranged for PACE program: Garrett Cristina is scheduled for a standing appointment at 1:30pm starting 09/04/2024. This appointment repeats on Wednesday of every week until 09/18/2024.  At 12:30pm [facility] will  Garrett Cristina at [address] and take them to [address] [place] [address]  At 3:00pm [facility] will  Garrett Cristina at [address], [place] and take them to [address]     [facility] homecare: Mathew Ramos [phone]    [facility] transportation: arranged for PACE program: Garrett Cristina is scheduled for a standing appointment at 1:30pm starting 09/04/2024. This appointment repeats on Wednesday of every week until 09/18/2024.    At 12:30pm [facility] [employer] will  Garrett Cristina at [address] and take them to [address] [place] [address]    At 3:00pm [facility] [employer] will  Garrett Cristina at [address], [place] and take them to [address]

## 2024-08-26 NOTE — BH DISCHARGE NOTE NURSING/SOCIAL WORK/PSYCH REHAB - DISCHARGE INSTRUCTIONS AFTERCARE APPOINTMENTS
In order to check the location, date, or time of your aftercare appointment, please refer to your Discharge Instructions Document given to you upon leaving the hospital.  If you have lost the instructions please call [phone]

## 2024-08-26 NOTE — BH INPATIENT PSYCHIATRY PROGRESS NOTE - NSBHFUPINTERVALHXFT_PSY_A_CORE
Pt is seen and evaluated.  Chart is reviewed and case is d/w treatment team.  No acute events overnight or over the weekend. Pt slept well, compliant with meds. Remains engaged in treatment and attending most groups. Remains with good/stable mood. Denies SIIP. Denies AH, intrusive thoughts, or other psychotic symptoms. Pt to receive Haldol Dec 200mg next dose on 8/28, now q3 weeks, with plans ongoing for discharge planning on 8/28 following.  Meds submitted to [facility] as well.    See below wound care assessment:  Left thigh wound evaluated today on 8/26 and although improved requires continued monitoring via home care, 3 times per week, as an outpatient.  Denies pain or discomfort at this time.  In summary patient with h/o of recurring MRSA abscesses including right buttock, perineum, left leg.  Initial evaluation noted by wound care RN reviewed from 7/29.  Pt with a hx of frequent skin infection (MRSA abscess / cellulitis w/ MRSA bacteremia s/p debridement 6/2023).    On initial wound assessment on 7/29 left thigh wound measuring 1.5 cm x 1 cm x 0.02 cm no undermining, no tunneling, wound bed with 50% tan-moist translucent fibrin film. Minimal drainage, erythema, and induration. MRSA and Staph aureus PCR -resulted not detected.  Left proximal leg- Healing wound measuring 2.5 cm x 2.5 cm skin intact no drainage, mild erythema.  Please continue wound care recommendations:  Topical recommendations for left thigh wound- cleanse wound and Periwound skin with NS. Apply Liquid barrier film to Periwound skin. Apply Medihoney gel to wound base, cover with Allevyn dressing and change daily and prn if soiled/compromised.  Left leg wound- Keep open to air.  Monitor for S&S of infections.    Upon discharge f/u at [facility] [address] [facility] [phone]

## 2024-08-26 NOTE — BH DISCHARGE NOTE NURSING/SOCIAL WORK/PSYCH REHAB - PATIENT PORTAL LINK FT
You can access the FollowMyHealth Patient Portal offered by [facility] by registering at the following website: [email or web address]. By joining [facility]’s FollowMyHealth portal, you will also be able to view your health information using other applications (apps) compatible with our system.

## 2024-08-26 NOTE — BH INPATIENT PSYCHIATRY PROGRESS NOTE - NSBHMETABOLIC_PSY_ALL_CORE_FT
BMI: BMI (kg/m2): 31.6 (08-20-24 @ 08:19)  HbA1c: A1C with Estimated Average Glucose Result: 5.8 % (07-24-24 @ 07:48)    Glucose: POCT Blood Glucose.: 120 mg/dL (08-13-24 @ 11:33)    BP: 94/71 (08-25-24 @ 08:42) (94/71 - 94/71)Vital Signs Last 24 Hrs  T(C): 36.7 (08-26-24 @ 08:17), Max: 36.7 (08-26-24 @ 08:17)  T(F): 98 (08-26-24 @ 08:17), Max: 98 (08-26-24 @ 08:17)  HR: --  BP: --  BP(mean): --  RR: 18 (08-26-24 @ 08:17) (18 - 18)  SpO2: --    Orthostatic VS  08-26-24 @ 08:17  Lying BP: --/-- HR: --  Sitting BP: 134/79 HR: 62  Standing BP: 120/67 HR: --  Site: --  Mode: --  Orthostatic VS  08-25-24 @ 20:22  Lying BP: --/-- HR: --  Sitting BP: 117/65 HR: 57  Standing BP: 100/60 HR: 61  Site: --  Mode: --  Orthostatic VS  08-24-24 @ 20:17  Lying BP: --/-- HR: --  Sitting BP: 111/76 HR: 108  Standing BP: 110/72 HR: 112  Site: --  Mode: --    Lipid Panel: Date/Time: 07-24-24 @ 07:48  Cholesterol, Serum: 81  LDL Cholesterol Calculated: 34  HDL Cholesterol, Serum: 30  Total Cholesterol/HDL Ration Measurement: --  Triglycerides, Serum: 84

## 2024-08-27 PROCEDURE — 99232 SBSQ HOSP IP/OBS MODERATE 35: CPT

## 2024-08-27 RX ORDER — LORAZEPAM 4 MG/ML
2 INJECTION INTRAMUSCULAR; INTRAVENOUS ONCE
Refills: 0 | Status: DISCONTINUED | OUTPATIENT
Start: 2024-08-27 | End: 2024-08-28

## 2024-08-27 RX ORDER — LORAZEPAM 4 MG/ML
1 INJECTION INTRAMUSCULAR; INTRAVENOUS EVERY 6 HOURS
Refills: 0 | Status: DISCONTINUED | OUTPATIENT
Start: 2024-08-27 | End: 2024-08-28

## 2024-08-27 RX ADMIN — Medication 200 MILLIGRAM(S): at 20:31

## 2024-08-27 RX ADMIN — METFORMIN HYDROCHLORIDE 500 MILLIGRAM(S): 850 TABLET, FILM COATED ORAL at 08:31

## 2024-08-27 RX ADMIN — SODIUM CHLORIDE 1 GRAM(S): 9 INJECTION INTRAMUSCULAR; INTRAVENOUS; SUBCUTANEOUS at 08:31

## 2024-08-27 RX ADMIN — ESCITALOPRAM OXALATE 10 MILLIGRAM(S): 10 TABLET ORAL at 08:30

## 2024-08-27 RX ADMIN — Medication 50 MILLIGRAM(S): at 20:31

## 2024-08-27 RX ADMIN — METFORMIN HYDROCHLORIDE 500 MILLIGRAM(S): 850 TABLET, FILM COATED ORAL at 20:31

## 2024-08-27 RX ADMIN — SODIUM CHLORIDE 1 GRAM(S): 9 INJECTION INTRAMUSCULAR; INTRAVENOUS; SUBCUTANEOUS at 20:33

## 2024-08-27 RX ADMIN — BENZTROPINE MESYLATE 1 MILLIGRAM(S): 2 TABLET ORAL at 20:32

## 2024-08-27 RX ADMIN — SODIUM CHLORIDE 1 GRAM(S): 9 INJECTION INTRAMUSCULAR; INTRAVENOUS; SUBCUTANEOUS at 18:55

## 2024-08-27 RX ADMIN — Medication 40 MILLIGRAM(S): at 20:31

## 2024-08-27 RX ADMIN — Medication 50 MICROGRAM(S): at 06:12

## 2024-08-27 RX ADMIN — TAMSULOSIN HYDROCHLORIDE 0.4 MILLIGRAM(S): 0.4 CAPSULE ORAL at 20:30

## 2024-08-27 NOTE — BH INPATIENT PSYCHIATRY PROGRESS NOTE - NSTXIMPULSDATEEST_PSY_ALL_CORE
26-Jul-2024

## 2024-08-27 NOTE — BH INPATIENT PSYCHIATRY PROGRESS NOTE - MSE OPTIONS
Structured MSE
Unstructured MSE
Unstructured MSE
Structured MSE
Unstructured MSE
Structured MSE
Structured MSE
Unstructured MSE
Unstructured MSE
Structured MSE
Structured MSE
Unstructured MSE
Unstructured MSE
Structured MSE
Unstructured MSE
Unstructured MSE
Structured MSE

## 2024-08-27 NOTE — BH INPATIENT PSYCHIATRY PROGRESS NOTE - NSTXDCSOCPROGRES_PSY_ALL_CORE
Improving
No Change
Improving
No Change
No Change
Improving
Improving
No Change
No Change
Improving
No Change
Improving
No Change
Improving
Improving
No Change

## 2024-08-27 NOTE — BH INPATIENT PSYCHIATRY PROGRESS NOTE - NSTXOTHERDATETRGT_PSY_ALL_CORE
18-Aug-2024
25-Aug-2024
18-Aug-2024
18-Aug-2024
04-Aug-2024
01-Sep-2024
18-Aug-2024
25-Aug-2024
04-Aug-2024
11-Aug-2024
25-Aug-2024
27-Aug-2024
25-Aug-2024
18-Aug-2024
11-Aug-2024
25-Aug-2024
04-Aug-2024
11-Aug-2024
04-Aug-2024

## 2024-08-27 NOTE — BH INPATIENT PSYCHIATRY PROGRESS NOTE - NSBHATTESTTYPEVISIT_PSY_A_CORE
ANA without on-site Attending supervision
Attending Only
Attending Only
ANA without on-site Attending supervision
ANA without on-site Attending supervision
Attending Only
Attending Only
ANA without on-site Attending supervision
Attending Only
ANA without on-site Attending supervision
Attending Only
ANA without on-site Attending supervision
Attending Only
Attending Only
ANA without on-site Attending supervision

## 2024-08-27 NOTE — BH INPATIENT PSYCHIATRY PROGRESS NOTE - NSBHCHARTREVIEWVS_PSY_A_CORE FT
Vital Signs Last 24 Hrs  T(C): 36.4 (08-27-24 @ 08:29), Max: 37.1 (08-26-24 @ 18:45)  T(F): 97.5 (08-27-24 @ 08:29), Max: 98.7 (08-26-24 @ 18:45)  HR: --  BP: --  BP(mean): --  RR: --  SpO2: --    Orthostatic VS  08-27-24 @ 10:01  Lying BP: --/-- HR: --  Sitting BP: 120/90 HR: 64  Standing BP: 112/59 HR: 70  Site: --  Mode: --  Orthostatic VS  08-27-24 @ 08:29  Lying BP: --/-- HR: --  Sitting BP: 140/80 HR: 59  Standing BP: 129/60 HR: 68  Site: upper left arm  Mode: electronic  Orthostatic VS  08-26-24 @ 18:45  Lying BP: --/-- HR: --  Sitting BP: 127/68 HR: 63  Standing BP: 119/64 HR: 63  Site: upper left arm  Mode: electronic  Orthostatic VS  08-26-24 @ 08:17  Lying BP: --/-- HR: --  Sitting BP: 134/79 HR: 62  Standing BP: 120/67 HR: --  Site: --  Mode: --  Orthostatic VS  08-25-24 @ 20:22  Lying BP: --/-- HR: --  Sitting BP: 117/65 HR: 57  Standing BP: 100/60 HR: 61  Site: --  Mode: --  
Vital Signs Last 24 Hrs  T(C): 36.4 (08-22-24 @ 08:30), Max: 37.2 (08-21-24 @ 19:02)  T(F): 97.5 (08-22-24 @ 08:30), Max: 98.9 (08-21-24 @ 19:02)  HR: --  BP: --  BP(mean): --  RR: --  SpO2: --    Orthostatic VS  08-22-24 @ 08:30  Lying BP: --/-- HR: --  Sitting BP: 131/72 HR: 60  Standing BP: 114/58 HR: 71  Site: --  Mode: --  Orthostatic VS  08-21-24 @ 19:02  Lying BP: --/-- HR: --  Sitting BP: 133/72 HR: 67  Standing BP: 121/63 HR: 68  Site: --  Mode: --  Orthostatic VS  08-21-24 @ 06:43  Lying BP: --/-- HR: --  Sitting BP: 125/75 HR: 57  Standing BP: 110/61 HR: 74  Site: upper right arm  Mode: electronic  Orthostatic VS  08-20-24 @ 20:19  Lying BP: --/-- HR: --  Sitting BP: 111/58 HR: 55  Standing BP: 113/63 HR: 60  Site: --  Mode: --  
Vital Signs Last 24 Hrs  T(C): 36.7 (08-09-24 @ 08:12), Max: 36.7 (08-08-24 @ 21:22)  T(F): 98 (08-09-24 @ 08:12), Max: 98 (08-08-24 @ 21:22)  HR: 68 (08-09-24 @ 00:10) (59 - 68)  BP: 132/75 (08-08-24 @ 21:22) (132/75 - 132/75)  BP(mean): --  RR: 18 (08-08-24 @ 21:22) (18 - 18)  SpO2: 98% (08-08-24 @ 21:22) (98% - 98%)    Orthostatic VS  08-09-24 @ 09:00  Lying BP: --/-- HR: --  Sitting BP: 138/72 HR: --  Standing BP: --/-- HR: --  Site: --  Mode: --  Orthostatic VS  08-09-24 @ 08:12  Lying BP: --/-- HR: --  Sitting BP: 118/65 HR: 60  Standing BP: 98/60 HR: 62  Site: --  Mode: --  Orthostatic VS  08-09-24 @ 00:02  Lying BP: --/-- HR: --  Sitting BP: 152/72 HR: 58  Standing BP: 137/71 HR: 64  Site: --  Mode: --  Orthostatic VS  08-08-24 @ 08:35  Lying BP: --/-- HR: --  Sitting BP: 133/72 HR: 65  Standing BP: 118/68 HR: 77  Site: --  Mode: --  Orthostatic VS  08-07-24 @ 18:48  Lying BP: --/-- HR: --  Sitting BP: 131/68 HR: 58  Standing BP: --/-- HR: --  Site: --  Mode: --  
Vital Signs Last 24 Hrs  T(C): 36.8 (07-31-24 @ 08:17), Max: 36.8 (07-31-24 @ 08:17)  T(F): 98.3 (07-31-24 @ 08:17), Max: 98.3 (07-31-24 @ 08:17)  HR: 56 (07-30-24 @ 20:25) (56 - 56)  BP: 139/75 (07-30-24 @ 20:25) (139/75 - 139/75)  BP(mean): --  RR: --  SpO2: --    Orthostatic VS  07-31-24 @ 08:17  Lying BP: --/-- HR: --  Sitting BP: 130/62 HR: 65  Standing BP: 111/62 HR: 71  Site: --  Mode: --  Orthostatic VS  07-30-24 @ 08:20  Lying BP: --/-- HR: --  Sitting BP: 130/85 HR: 60  Standing BP: 112/66 HR: 77  Site: --  Mode: --  
Vital Signs Last 24 Hrs  T(C): 36.7 (08-16-24 @ 08:14), Max: 36.9 (08-15-24 @ 18:45)  T(F): 98 (08-16-24 @ 08:14), Max: 98.4 (08-15-24 @ 18:45)  HR: --  BP: --  BP(mean): --  RR: --  SpO2: --    Orthostatic VS  08-16-24 @ 08:14  Lying BP: 114/77 HR: 67  Sitting BP: 108/60 HR: 75  Standing BP: --/-- HR: --  Site: --  Mode: --  Orthostatic VS  08-15-24 @ 18:45  Lying BP: --/-- HR: --  Sitting BP: 141/75 HR: 62  Standing BP: --/-- HR: --  Site: --  Mode: --  Orthostatic VS  08-15-24 @ 15:57  Lying BP: --/-- HR: --  Sitting BP: 134/86 HR: 56  Standing BP: 122/66 HR: 65  Site: --  Mode: --  Orthostatic VS  08-15-24 @ 07:55  Lying BP: --/-- HR: --  Sitting BP: 138/68 HR: 54  Standing BP: 125/63 HR: 66  Site: --  Mode: --  Orthostatic VS  08-14-24 @ 19:29  Lying BP: --/-- HR: --  Sitting BP: 149/79 HR: 61  Standing BP: 153/75 HR: 70  Site: --  Mode: --  
Vital Signs Last 24 Hrs  T(C): 36.8 (08-06-24 @ 08:35), Max: 36.8 (08-06-24 @ 08:35)  T(F): 98.2 (08-06-24 @ 08:35), Max: 98.2 (08-06-24 @ 08:35)  HR: --  BP: --  BP(mean): --  RR: --  SpO2: --    Orthostatic VS  08-06-24 @ 09:13  Lying BP: --/-- HR: --  Sitting BP: 130/73 HR: 86  Standing BP: --/-- HR: --  Site: --  Mode: --  Orthostatic VS  08-06-24 @ 08:35  Lying BP: --/-- HR: --  Sitting BP: 120/60 HR: 73  Standing BP: 99/65 HR: 89  Site: --  Mode: --  Orthostatic VS  08-05-24 @ 19:55  Lying BP: --/-- HR: --  Sitting BP: 147/78 HR: 66  Standing BP: 132/72 HR: 75  Site: --  Mode: --  Orthostatic VS  08-05-24 @ 08:48  Lying BP: --/-- HR: --  Sitting BP: 121/69 HR: 62  Standing BP: 118/66 HR: 79  Site: --  Mode: --  Orthostatic VS  08-04-24 @ 19:32  Lying BP: --/-- HR: --  Sitting BP: 142/77 HR: 69  Standing BP: 127/67 HR: 68  Site: upper left arm  Mode: electronic  
Vital Signs Last 24 Hrs  T(C): 36.4 (08-15-24 @ 07:55), Max: 36.4 (08-15-24 @ 07:55)  T(F): 97.6 (08-15-24 @ 07:55), Max: 97.6 (08-15-24 @ 07:55)  HR: --  BP: --  BP(mean): --  RR: --  SpO2: --    Orthostatic VS  08-15-24 @ 07:55  Lying BP: --/-- HR: --  Sitting BP: 138/68 HR: 54  Standing BP: 125/63 HR: 66  Site: --  Mode: --  Orthostatic VS  08-14-24 @ 19:29  Lying BP: --/-- HR: --  Sitting BP: 149/79 HR: 61  Standing BP: 153/75 HR: 70  Site: --  Mode: --  Orthostatic VS  08-14-24 @ 08:22  Lying BP: --/-- HR: --  Sitting BP: 101/63 HR: 62  Standing BP: 96/64 HR: 69  Site: upper left arm  Mode: electronic  Orthostatic VS  08-13-24 @ 18:48  Lying BP: --/-- HR: --  Sitting BP: 134/72 HR: 60  Standing BP: 123/69 HR: 60  Site: --  Mode: --  
Vital Signs Last 24 Hrs  T(C): 36.8 (07-28-24 @ 08:02), Max: 36.9 (07-27-24 @ 19:43)  T(F): 98.2 (07-28-24 @ 08:02), Max: 98.4 (07-27-24 @ 19:43)  HR: --  BP: --  BP(mean): --  RR: --  SpO2: --    Orthostatic VS  07-28-24 @ 08:02  Lying BP: --/-- HR: --  Sitting BP: 129/74 HR: 66  Standing BP: 132/70 HR: 53  Site: --  Mode: --  Orthostatic VS  07-27-24 @ 19:43  Lying BP: --/-- HR: --  Sitting BP: 138/69 HR: 59  Standing BP: 123/61 HR: 67  Site: --  Mode: --  Orthostatic VS  07-27-24 @ 08:18  Lying BP: --/-- HR: --  Sitting BP: 135/74 HR: 70  Standing BP: 129/63 HR: 75  Site: --  Mode: --  Orthostatic VS  07-26-24 @ 21:20  Lying BP: --/-- HR: --  Sitting BP: 197/89 HR: 70  Standing BP: 161/90 HR: 76  Site: --  Mode: --  Orthostatic VS  07-26-24 @ 20:21  Lying BP: --/-- HR: --  Sitting BP: 166/85 HR: 66  Standing BP: 158/76 HR: 68  Site: --  Mode: --  
Vital Signs Last 24 Hrs  T(C): 36.6 (08-08-24 @ 15:27), Max: 36.6 (08-07-24 @ 18:48)  T(F): 97.9 (08-08-24 @ 15:27), Max: 97.9 (08-08-24 @ 15:27)  HR: 62 (08-08-24 @ 15:27) (62 - 68)  BP: 129/79 (08-08-24 @ 15:27) (129/79 - 132/72)  BP(mean): --  RR: 17 (08-08-24 @ 15:27) (17 - 17)  SpO2: 95% (08-08-24 @ 15:27) (95% - 95%)    Orthostatic VS  08-08-24 @ 08:35  Lying BP: --/-- HR: --  Sitting BP: 133/72 HR: 65  Standing BP: 118/68 HR: 77  Site: --  Mode: --  Orthostatic VS  08-07-24 @ 18:48  Lying BP: --/-- HR: --  Sitting BP: 131/68 HR: 58  Standing BP: --/-- HR: --  Site: --  Mode: --  Orthostatic VS  08-07-24 @ 07:30  Lying BP: --/-- HR: --  Sitting BP: 114/76 HR: 70  Standing BP: 94/60 HR: 79  Site: --  Mode: electronic  Orthostatic VS  08-06-24 @ 18:53  Lying BP: --/-- HR: --  Sitting BP: 135/72 HR: 65  Standing BP: --/-- HR: --  Site: --  Mode: --  
Orthostatic VS    08-12-24 @ 07:50  Lying BP: --/-- HR: --   Sitting BP: Orthostatic BP (Sitting Systolic): 136/Orthostatic BP (Sitting Diastolic (mm Hg)): 75 HR: Orthostatic Pulse (Heart Rate (beats/min)): 53  Standing BP: Orthostatic BP (Standing Systolic): 125/Orthostatic BP (Standing Diastolic (mm Hg)): 70 HR: Orthostatic Pulse (Heart Rate (beats/min)): 59  Site: --   Mode: --    08-12-24 @ 06:48  Lying BP: --/-- HR: --   Sitting BP: Orthostatic BP (Sitting Systolic): 139/Orthostatic BP (Sitting Diastolic (mm Hg)): 80 HR: Orthostatic Pulse (Heart Rate (beats/min)): 61  Standing BP: Orthostatic BP (Standing Systolic): 109/Orthostatic BP (Standing Diastolic (mm Hg)): 61 HR: Orthostatic Pulse (Heart Rate (beats/min)): 70  Site: --   Mode: --    08-11-24 @ 19:06  Lying BP: --/-- HR: --   Sitting BP: Orthostatic BP (Sitting Systolic): 130/Orthostatic BP (Sitting Diastolic (mm Hg)): 72 HR: Orthostatic Pulse (Heart Rate (beats/min)): 68  Standing BP: --/-- HR: --  Site: --   Mode: --    08-11-24 @ 11:51  Lying BP: --/-- HR: --   Sitting BP: Orthostatic BP (Sitting Systolic): 128/Orthostatic BP (Sitting Diastolic (mm Hg)): 77 HR: Orthostatic Pulse (Heart Rate (beats/min)): 67  Standing BP: Orthostatic BP (Standing Systolic): 115/Orthostatic BP (Standing Diastolic (mm Hg)): 72 HR: Orthostatic Pulse (Heart Rate (beats/min)): 69  Site: --   Mode: --    08-11-24 @ 06:33  Lying BP: --/-- HR: --   Sitting BP: Orthostatic BP (Sitting Systolic): 130/Orthostatic BP (Sitting Diastolic (mm Hg)): 74 HR: Orthostatic Pulse (Heart Rate (beats/min)): 54  Standing BP: Orthostatic BP (Standing Systolic): 115/Orthostatic BP (Standing Diastolic (mm Hg)): 66 HR: Orthostatic Pulse (Heart Rate (beats/min)): 61  Site: --   Mode: --    08-10-24 @ 19:46  Lying BP: --/-- HR: --   Sitting BP: Orthostatic BP (Sitting Systolic): 134/Orthostatic BP (Sitting Diastolic (mm Hg)): 63 HR: Orthostatic Pulse (Heart Rate (beats/min)): 58  Standing BP: Orthostatic BP (Standing Systolic): 121/Orthostatic BP (Standing Diastolic (mm Hg)): 60 HR: Orthostatic Pulse (Heart Rate (beats/min)): 67  Site: --   Mode: --          
Vital Signs Last 24 Hrs  T(C): 36.3 (08-14-24 @ 08:22), Max: 36.4 (08-13-24 @ 18:48)  T(F): 97.3 (08-14-24 @ 08:22), Max: 97.5 (08-13-24 @ 18:48)  HR: --  BP: --  BP(mean): --  RR: --  SpO2: --    Orthostatic VS  08-14-24 @ 08:22  Lying BP: --/-- HR: --  Sitting BP: 101/63 HR: 62  Standing BP: 96/64 HR: 69  Site: upper left arm  Mode: electronic  Orthostatic VS  08-13-24 @ 18:48  Lying BP: --/-- HR: --  Sitting BP: 134/72 HR: 60  Standing BP: 123/69 HR: 60  Site: --  Mode: --  Orthostatic VS  08-13-24 @ 08:55  Lying BP: --/-- HR: --  Sitting BP: 140/75 HR: 61  Standing BP: 122/70 HR: 69  Site: --  Mode: --  Orthostatic VS  08-13-24 @ 08:25  Lying BP: --/-- HR: --  Sitting BP: 122/74 HR: 57  Standing BP: 114/60 HR: 70  Site: upper left arm  Mode: electronic  Orthostatic VS  08-12-24 @ 19:45  Lying BP: --/-- HR: --  Sitting BP: 131/71 HR: 66  Standing BP: 125/60 HR: 68  Site: --  Mode: --  
Vital Signs Last 24 Hrs  T(C): 36.4 (07-29-24 @ 08:32), Max: 36.6 (07-28-24 @ 20:15)  T(F): 97.6 (07-29-24 @ 08:32), Max: 97.8 (07-28-24 @ 20:15)  HR: 70 (07-28-24 @ 20:15) (70 - 70)  BP: 132/76 (07-28-24 @ 20:15) (132/76 - 132/76)  BP(mean): --  RR: --  SpO2: --    Orthostatic VS  07-29-24 @ 08:32  Lying BP: --/-- HR: --  Sitting BP: 120/81 HR: 60  Standing BP: 110/70 HR: 64  Site: --  Mode: --  Orthostatic VS  07-28-24 @ 08:02  Lying BP: --/-- HR: --  Sitting BP: 129/74 HR: 66  Standing BP: 132/70 HR: 53  Site: --  Mode: --  Orthostatic VS  07-27-24 @ 19:43  Lying BP: --/-- HR: --  Sitting BP: 138/69 HR: 59  Standing BP: 123/61 HR: 67  Site: --  Mode: --  
Vital Signs Last 24 Hrs  T(C): 36.7 (08-26-24 @ 08:17), Max: 36.7 (08-26-24 @ 08:17)  T(F): 98 (08-26-24 @ 08:17), Max: 98 (08-26-24 @ 08:17)  HR: --  BP: --  BP(mean): --  RR: 18 (08-26-24 @ 08:17) (18 - 18)  SpO2: --    Orthostatic VS  08-26-24 @ 08:17  Lying BP: --/-- HR: --  Sitting BP: 134/79 HR: 62  Standing BP: 120/67 HR: --  Site: --  Mode: --  Orthostatic VS  08-25-24 @ 20:22  Lying BP: --/-- HR: --  Sitting BP: 117/65 HR: 57  Standing BP: 100/60 HR: 61  Site: --  Mode: --  Orthostatic VS  08-24-24 @ 20:17  Lying BP: --/-- HR: --  Sitting BP: 111/76 HR: 108  Standing BP: 110/72 HR: 112  Site: --  Mode: --  
Vital Signs Last 24 Hrs  T(C): 36.8 (08-02-24 @ 08:14), Max: 36.8 (08-02-24 @ 08:14)  T(F): 98.2 (08-02-24 @ 08:14), Max: 98.2 (08-02-24 @ 08:14)  HR: --  BP: --  BP(mean): --  RR: --  SpO2: --    Orthostatic VS  08-02-24 @ 08:14  Lying BP: --/-- HR: --  Sitting BP: 141/77 HR: 60  Standing BP: --/-- HR: --  Site: --  Mode: --  Orthostatic VS  08-01-24 @ 18:55  Lying BP: --/-- HR: --  Sitting BP: 140/80 HR: 66  Standing BP: 138/82 HR: 66  Site: --  Mode: --  Orthostatic VS  08-01-24 @ 07:59  Lying BP: --/-- HR: --  Sitting BP: 135/76 HR: 66  Standing BP: 116/58 HR: 79  Site: --  Mode: --  Orthostatic VS  07-31-24 @ 18:58  Lying BP: --/-- HR: --  Sitting BP: 133/69 HR: 73  Standing BP: --/-- HR: --  Site: --  Mode: --  
Vital Signs Last 24 Hrs  T(C): 36.3 (08-21-24 @ 06:43), Max: 36.9 (08-20-24 @ 20:19)  T(F): 97.4 (08-21-24 @ 06:43), Max: 98.5 (08-20-24 @ 20:19)  HR: 55 (08-20-24 @ 20:47) (55 - 55)  BP: 117/76 (08-20-24 @ 20:47) (117/76 - 117/76)  BP(mean): --  RR: --  SpO2: --    Orthostatic VS  08-21-24 @ 06:43  Lying BP: --/-- HR: --  Sitting BP: 125/75 HR: 57  Standing BP: 110/61 HR: 74  Site: upper right arm  Mode: electronic  Orthostatic VS  08-20-24 @ 20:19  Lying BP: --/-- HR: --  Sitting BP: 111/58 HR: 55  Standing BP: 113/63 HR: 60  Site: --  Mode: --  Orthostatic VS  08-20-24 @ 08:35  Lying BP: --/-- HR: --  Sitting BP: 121/72 HR: 60  Standing BP: 100/60 HR: 60  Site: --  Mode: --  
Vital Signs Last 24 Hrs  T(C): 36.6 (07-27-24 @ 08:18), Max: 37.1 (07-26-24 @ 17:39)  T(F): 97.8 (07-27-24 @ 08:18), Max: 98.7 (07-26-24 @ 17:39)  HR: 64 (07-26-24 @ 17:39) (64 - 64)  BP: 135/80 (07-26-24 @ 17:39) (135/80 - 135/80)  BP(mean): --  RR: 18 (07-26-24 @ 21:20) (18 - 18)  SpO2: 97% (07-26-24 @ 17:39) (97% - 97%)    Orthostatic VS  07-27-24 @ 08:18  Lying BP: --/-- HR: --  Sitting BP: 135/74 HR: 70  Standing BP: 129/63 HR: 75  Site: --  Mode: --  Orthostatic VS  07-26-24 @ 21:20  Lying BP: --/-- HR: --  Sitting BP: 197/89 HR: 70  Standing BP: 161/90 HR: 76  Site: --  Mode: --  Orthostatic VS  07-26-24 @ 20:21  Lying BP: --/-- HR: --  Sitting BP: 166/85 HR: 66  Standing BP: 158/76 HR: 68  Site: --  Mode: --  
Orthostatic VS    08-13-24 @ 08:55  Lying BP: --/-- HR: --   Sitting BP: Orthostatic BP (Sitting Systolic): 140/Orthostatic BP (Sitting Diastolic (mm Hg)): 75 HR: Orthostatic Pulse (Heart Rate (beats/min)): 61  Standing BP: Orthostatic BP (Standing Systolic): 122/Orthostatic BP (Standing Diastolic (mm Hg)): 70 HR: Orthostatic Pulse (Heart Rate (beats/min)): 69  Site: --   Mode: --    08-13-24 @ 08:25  Lying BP: --/-- HR: --   Sitting BP: Orthostatic BP (Sitting Systolic): 122/Orthostatic BP (Sitting Diastolic (mm Hg)): 74 HR: Orthostatic Pulse (Heart Rate (beats/min)): 57  Standing BP: Orthostatic BP (Standing Systolic): 114/Orthostatic BP (Standing Diastolic (mm Hg)): 60 HR: Orthostatic Pulse (Heart Rate (beats/min)): 70  Site: Orthostatic BP/Pulse (Site): upper left arm   Mode: Orthostatic BP/ Pulse (Mode): electronic    08-12-24 @ 19:45  Lying BP: --/-- HR: --   Sitting BP: Orthostatic BP (Sitting Systolic): 131/Orthostatic BP (Sitting Diastolic (mm Hg)): 71 HR: Orthostatic Pulse (Heart Rate (beats/min)): 66  Standing BP: Orthostatic BP (Standing Systolic): 125/Orthostatic BP (Standing Diastolic (mm Hg)): 60 HR: Orthostatic Pulse (Heart Rate (beats/min)): 68  Site: --   Mode: --    08-12-24 @ 07:50  Lying BP: --/-- HR: --   Sitting BP: Orthostatic BP (Sitting Systolic): 136/Orthostatic BP (Sitting Diastolic (mm Hg)): 75 HR: Orthostatic Pulse (Heart Rate (beats/min)): 53  Standing BP: Orthostatic BP (Standing Systolic): 125/Orthostatic BP (Standing Diastolic (mm Hg)): 70 HR: Orthostatic Pulse (Heart Rate (beats/min)): 59  Site: --   Mode: --    08-12-24 @ 06:48  Lying BP: --/-- HR: --   Sitting BP: Orthostatic BP (Sitting Systolic): 139/Orthostatic BP (Sitting Diastolic (mm Hg)): 80 HR: Orthostatic Pulse (Heart Rate (beats/min)): 61  Standing BP: Orthostatic BP (Standing Systolic): 109/Orthostatic BP (Standing Diastolic (mm Hg)): 61 HR: Orthostatic Pulse (Heart Rate (beats/min)): 70  Site: --   Mode: --    08-11-24 @ 19:06  Lying BP: --/-- HR: --   Sitting BP: Orthostatic BP (Sitting Systolic): 130/Orthostatic BP (Sitting Diastolic (mm Hg)): 72 HR: Orthostatic Pulse (Heart Rate (beats/min)): 68  Standing BP: --/-- HR: --  Site: --   Mode: --  
Vital Signs Last 24 Hrs  T(C): 36.4 (08-19-24 @ 08:25), Max: 36.4 (08-18-24 @ 19:14)  T(F): 97.6 (08-19-24 @ 08:25), Max: 97.6 (08-18-24 @ 19:14)  HR: --  BP: --  BP(mean): --  RR: --  SpO2: --    Orthostatic VS  08-19-24 @ 08:25  Lying BP: --/-- HR: --  Sitting BP: 133/77 HR: 69  Standing BP: 110/65 HR: 82  Site: --  Mode: --  Orthostatic VS  08-18-24 @ 19:14  Lying BP: --/-- HR: --  Sitting BP: 130/70 HR: 67  Standing BP: 125/67 HR: 68  Site: --  Mode: --  Orthostatic VS  08-18-24 @ 08:31  Lying BP: --/-- HR: --  Sitting BP: 126/67 HR: 65  Standing BP: 120/64 HR: 68  Site: --  Mode: --  Orthostatic VS  08-17-24 @ 19:02  Lying BP: --/-- HR: --  Sitting BP: 129/76 HR: 59  Standing BP: 119/65 HR: 65  Site: --  Mode: --  
Vital Signs Last 24 Hrs  T(C): 36.3 (08-20-24 @ 08:35), Max: 36.3 (08-20-24 @ 08:35)  T(F): 97.4 (08-20-24 @ 08:35), Max: 97.4 (08-20-24 @ 08:35)  HR: --  BP: --  BP(mean): --  RR: --  SpO2: --    Orthostatic VS  08-20-24 @ 08:35  Lying BP: --/-- HR: --  Sitting BP: 121/72 HR: 60  Standing BP: 100/60 HR: 60  Site: --  Mode: --  Orthostatic VS  08-19-24 @ 08:25  Lying BP: --/-- HR: --  Sitting BP: 133/77 HR: 69  Standing BP: 110/65 HR: 82  Site: --  Mode: --  Orthostatic VS  08-18-24 @ 19:14  Lying BP: --/-- HR: --  Sitting BP: 130/70 HR: 67  Standing BP: 125/67 HR: 68  Site: --  Mode: --  
Vital Signs Last 24 Hrs  T(C): 36.7 (08-23-24 @ 08:25), Max: 36.8 (08-22-24 @ 20:26)  T(F): 98.1 (08-23-24 @ 08:25), Max: 98.2 (08-22-24 @ 20:26)  HR: --  BP: --  BP(mean): --  RR: --  SpO2: --    Orthostatic VS  08-23-24 @ 08:25  Lying BP: --/-- HR: --  Sitting BP: 123/76 HR: 64  Standing BP: 107/66 HR: 82  Site: --  Mode: --  Orthostatic VS  08-22-24 @ 20:26  Lying BP: --/-- HR: --  Sitting BP: 123/64 HR: 61  Standing BP: 120/70 HR: 71  Site: upper left arm  Mode: electronic  Orthostatic VS  08-22-24 @ 08:30  Lying BP: --/-- HR: --  Sitting BP: 131/72 HR: 60  Standing BP: 114/58 HR: 71  Site: --  Mode: --  Orthostatic VS  08-21-24 @ 19:02  Lying BP: --/-- HR: --  Sitting BP: 133/72 HR: 67  Standing BP: 121/63 HR: 68  Site: --  Mode: --  
Vital Signs Last 24 Hrs  T(C): 36.9 (08-07-24 @ 07:30), Max: 36.9 (08-07-24 @ 07:30)  T(F): 98.5 (08-07-24 @ 07:30), Max: 98.5 (08-07-24 @ 07:30)  HR: --  BP: --  BP(mean): --  RR: --  SpO2: --    Orthostatic VS  08-07-24 @ 07:30  Lying BP: --/-- HR: --  Sitting BP: 114/76 HR: 70  Standing BP: 94/60 HR: 79  Site: --  Mode: electronic  Orthostatic VS  08-06-24 @ 18:53  Lying BP: --/-- HR: --  Sitting BP: 135/72 HR: 65  Standing BP: --/-- HR: --  Site: --  Mode: --  Orthostatic VS  08-06-24 @ 09:13  Lying BP: --/-- HR: --  Sitting BP: 130/73 HR: 86  Standing BP: --/-- HR: --  Site: --  Mode: --  Orthostatic VS  08-06-24 @ 08:35  Lying BP: --/-- HR: --  Sitting BP: 120/60 HR: 73  Standing BP: 99/65 HR: 89  Site: --  Mode: --  Orthostatic VS  08-05-24 @ 19:55  Lying BP: --/-- HR: --  Sitting BP: 147/78 HR: 66  Standing BP: 132/72 HR: 75  Site: --  Mode: --  
Vital Signs Last 24 Hrs  T(C): 36.6 (08-01-24 @ 07:59), Max: 36.7 (07-31-24 @ 18:58)  T(F): 97.8 (08-01-24 @ 07:59), Max: 98 (07-31-24 @ 18:58)  HR: --  BP: --  BP(mean): --  RR: --  SpO2: --    Orthostatic VS  08-01-24 @ 07:59  Lying BP: --/-- HR: --  Sitting BP: 135/76 HR: 66  Standing BP: 116/58 HR: 79  Site: --  Mode: --  Orthostatic VS  07-31-24 @ 18:58  Lying BP: --/-- HR: --  Sitting BP: 133/69 HR: 73  Standing BP: --/-- HR: --  Site: --  Mode: --  Orthostatic VS  07-31-24 @ 08:17  Lying BP: --/-- HR: --  Sitting BP: 130/62 HR: 65  Standing BP: 111/62 HR: 71  Site: --  Mode: --  
Vital Signs Last 24 Hrs  T(C): 36.7 (07-30-24 @ 08:20), Max: 36.7 (07-30-24 @ 08:20)  T(F): 98 (07-30-24 @ 08:20), Max: 98 (07-30-24 @ 08:20)  HR: 74 (07-29-24 @ 20:35) (74 - 74)  BP: 122/84 (07-29-24 @ 20:35) (122/84 - 122/84)  BP(mean): --  RR: --  SpO2: --    Orthostatic VS  07-30-24 @ 08:20  Lying BP: --/-- HR: --  Sitting BP: 130/85 HR: 60  Standing BP: 112/66 HR: 77  Site: --  Mode: --  Orthostatic VS  07-29-24 @ 08:32  Lying BP: --/-- HR: --  Sitting BP: 120/81 HR: 60  Standing BP: 110/70 HR: 64  Site: --  Mode: --  
Vital Signs Last 24 Hrs  T(C): 36.3 (08-05-24 @ 08:48), Max: 36.5 (08-04-24 @ 19:32)  T(F): 97.4 (08-05-24 @ 08:48), Max: 97.7 (08-04-24 @ 19:32)  HR: --  BP: --  BP(mean): --  RR: --  SpO2: --    Orthostatic VS  08-05-24 @ 08:48  Lying BP: --/-- HR: --  Sitting BP: 121/69 HR: 62  Standing BP: 118/66 HR: 79  Site: --  Mode: --  Orthostatic VS  08-04-24 @ 19:32  Lying BP: --/-- HR: --  Sitting BP: 142/77 HR: 69  Standing BP: 127/67 HR: 68  Site: upper left arm  Mode: electronic  Orthostatic VS  08-04-24 @ 08:15  Lying BP: --/-- HR: --  Sitting BP: 120/70 HR: 62  Standing BP: 108/60 HR: 72  Site: --  Mode: --  Orthostatic VS  08-03-24 @ 19:04  Lying BP: --/-- HR: --  Sitting BP: 145/70 HR: 63  Standing BP: --/-- HR: --  Site: --  Mode: --

## 2024-08-27 NOTE — BH INPATIENT PSYCHIATRY PROGRESS NOTE - NSTXSUICIDPROGRES_PSY_ALL_CORE
Met - goal discontinued

## 2024-08-27 NOTE — BH INPATIENT PSYCHIATRY PROGRESS NOTE - NSTXDCSOCDATEEST_PSY_ALL_CORE
05-Aug-2024
14-Aug-2024
29-Jul-2024
05-Aug-2024
20-Aug-2024
29-Jul-2024
14-Aug-2024
14-Aug-2024
05-Aug-2024
26-Aug-2024
05-Aug-2024
20-Aug-2024
05-Aug-2024
29-Jul-2024
14-Aug-2024
05-Aug-2024
26-Aug-2024
29-Jul-2024
29-Jul-2024
05-Aug-2024

## 2024-08-27 NOTE — BH INPATIENT PSYCHIATRY PROGRESS NOTE - NSBHFUPINTERVALCCFT_PSY_A_CORE
f/u psychosis and mood
f/u mood and psychosis
f/u mood and psychosis
reported feeling "ok".  
f/u mood and psychosis
f/u mood
reported feeling "pretty good".  
reported feeling "pretty good". 
reported feeling "ok".  
reported feeling "good".  
reported feeling "alright".  
f/u mood and psychosis
Patient seen for follow up for depression and SA.
f/u mood and psychosis
reported feeling "bored".  
f/u psychosis and mood
Patient seen for follow up for depression and SA.
Patient seen, chart reviewed, case discussed with team.  Patient seen for follow up of mood and psychosis.  
reported feeling "alright".  
reported feeling "good".  
reported feeling "pretty good". 
f/u mood and psychosis

## 2024-08-27 NOTE — BH INPATIENT PSYCHIATRY PROGRESS NOTE - NSTXDCSOCDATETRGT_PSY_ALL_CORE
12-Aug-2024
05-Aug-2024
12-Aug-2024
28-Aug-2024
28-Aug-2024
26-Aug-2024
12-Aug-2024
12-Aug-2024
28-Aug-2024
28-Aug-2024
23-Aug-2024
23-Aug-2024
28-Aug-2024
23-Aug-2024
12-Aug-2024
12-Aug-2024
28-Aug-2024
05-Aug-2024
12-Aug-2024

## 2024-08-27 NOTE — BH INPATIENT PSYCHIATRY PROGRESS NOTE - NSTXIMPULSDATETRGT_PSY_ALL_CORE
02-Aug-2024

## 2024-08-27 NOTE — BH INPATIENT PSYCHIATRY PROGRESS NOTE - NSTXSUICIDINTERMD_PSY_ALL_CORE
psychopharmacology, groups

## 2024-08-27 NOTE — BH INPATIENT PSYCHIATRY PROGRESS NOTE - NSBHFUPINTERVALHXFT_PSY_A_CORE
No new events, compliant, a trazodone prn.  beta blocker held this am (HR 59) later today 120/90 64 and 112/59 84, denies lightheadedness, denies dizziness.    No compliant,s looking forward to discharge.    Please continue wound care recommendations:  Topical recommendations for left thigh wound- cleanse wound and Periwound skin with NS. Apply Liquid barrier film to Periwound skin. Apply Medihoney gel to wound base, cover with Allevyn dressing and change daily and prn if soiled/compromised.  Left leg wound- Keep open to air.  Monitor for S&S of infections.    Upon discharge f/u at [facility] [address] [facility] [phone]

## 2024-08-27 NOTE — BH INPATIENT PSYCHIATRY PROGRESS NOTE - NSTXPROBOTHER_PSY_ALL_CORE
OTHER PROBLEM

## 2024-08-27 NOTE — BH INPATIENT PSYCHIATRY PROGRESS NOTE - NSTXSUICIDDATEEST_PSY_ALL_CORE
26-Jul-2024
Private car
26-Jul-2024

## 2024-08-27 NOTE — BH INPATIENT PSYCHIATRY PROGRESS NOTE - NSBHCONSDANGERSELF_PSY_A_CORE
suicidal behavior/unable to care for self
unable to care for self
unable to care for self
suicidal behavior/unable to care for self
unable to care for self
suicidal behavior/unable to care for self
unable to care for self
suicidal behavior/unable to care for self
unable to care for self
suicidal behavior/unable to care for self
suicidal behavior/unable to care for self
unable to care for self
suicidal behavior/unable to care for self
suicidal behavior/unable to care for self
unable to care for self
suicidal behavior/unable to care for self
unable to care for self
suicidal behavior/unable to care for self

## 2024-08-27 NOTE — BH INPATIENT PSYCHIATRY PROGRESS NOTE - NSTXPROBIMPULS_PSY_ALL_CORE
IMPULSIVITY/AGITATION

## 2024-08-27 NOTE — BH INPATIENT PSYCHIATRY PROGRESS NOTE - NSTXIMPULSPROGRES_PSY_ALL_CORE
Met - goal discontinued
Improving
Met - goal discontinued
Improving
Met - goal discontinued

## 2024-08-27 NOTE — BH INPATIENT PSYCHIATRY PROGRESS NOTE - NSTXPROBSUICID_PSY_ALL_CORE
SUICIDE/SELF-INJURIOUS BEHAVIOR

## 2024-08-27 NOTE — BH INPATIENT PSYCHIATRY PROGRESS NOTE - NSTXOTHERGOAL_PSY_ALL_CORE
Patient and  placed in the waiting room to wait for ride home that was not present during the procedure. They were instructed to please have their ride wait in the waiting room during entire procedure. Patient discharged home with family via wheelchair. VSS at discharge.
Will attend 2 groups per day to promote positive socialization with peers.
Will attend two groups per day to promote positive socialization with peers.
Will attend 2 groups per day to promote positive socialization with peers.
Will attend two groups per day to promote positive socialization with peers.
Will attend 2 groups per day to promote positive socialization with peers.

## 2024-08-27 NOTE — BH INPATIENT PSYCHIATRY PROGRESS NOTE - NSBHATTESTBILLING_PSY_A_CORE
[id number]-Xvzjwwaoth OBS or IP - moderate complexity OR 35-49 mins
26741-Jdskxaastk OBS or IP - moderate complexity OR 35-49 mins
16025-Qonpkkntyg OBS or IP - moderate complexity OR 35-49 mins
78166-Jskrbpbjpl OBS or IP - moderate complexity OR 35-49 mins
26762-Qtgjjimhbv OBS or IP - low complexity OR 25-34 mins
99198-Yontdduexc OBS or IP - moderate complexity OR 35-49 mins
79997-Hrscmfpuhc OBS or IP - low complexity OR 25-34 mins
03797-Ighfqjpsvy OBS or IP - moderate complexity OR 35-49 mins
06929-Brpxhiqlcl OBS or IP - moderate complexity OR 35-49 mins
53685-Izszsokttf OBS or IP - moderate complexity OR 35-49 mins
85304-Vsonszeizu OBS or IP - low complexity OR 25-34 mins
87331-Cuaadyqgiz OBS or IP - moderate complexity OR 35-49 mins
53406-Cryhwvodzd OBS or IP - low complexity OR 25-34 mins
58397-Tfrcglccuc OBS or IP - moderate complexity OR 35-49 mins
61652-Zyoondhcds OBS or IP - moderate complexity OR 35-49 mins
34891-Zbtguhqsny OBS or IP - moderate complexity OR 35-49 mins
89117-Qyevulqura OBS or IP - moderate complexity OR 35-49 mins
07338-Vukjzozcwv OBS or IP - high complexity OR 50-79 mins
15077-Pcbpzferst OBS or IP - moderate complexity OR 35-49 mins
46587-Pleiyhetyg OBS or IP - moderate complexity OR 35-49 mins
13619-Bazwkbtwkv OBS or IP - moderate complexity OR 35-49 mins
17466-Hpujgjuanh OBS or IP - moderate complexity OR 35-49 mins
56726-Siuqtgkjwu OBS or IP - moderate complexity OR 35-49 mins
81043-Dfpdzottoa OBS or IP - moderate complexity OR 35-49 mins

## 2024-08-27 NOTE — BH INPATIENT PSYCHIATRY PROGRESS NOTE - MSE UNSTRUCTURED FT
Pt is a 57yo man with appropriate grooming and hygiene. He is calm and cooperative with good eye contact. No psychomotor abnormalities noted. Speech is normal in rate and volume, spontaneous. Stated mood is "good." Affect is euthymic, full. TP is linear. TC: without paranoia or evidence of mason delusions, denies SIIP, denies intrusive thoughts, +hopeful. No perceptual disturbances noted or reported. Insight and judgement are fair. Impulse control is intact. 
Pt is a 57yo man with appropriate grooming and hygiene. He is calm and cooperative with good eye contact. No psychomotor abnormalities noted. Speech is normal in rate and volume, spontaneous. Stated mood is "good." Affect is euthymic, full. TP is linear. TC: without paranoia or evidence of mason delusions, denies SIIP, denies intrusive thoughts, +hopeful. No perceptual disturbances noted or reported. Insight and judgement are fair. Impulse control is intact. 
Pt is a 57yo man with appropriate grooming and hygiene. Calm and cooperative perhaps somewhat sueprficial. No psychomotor abnormalities noted. Speech is unremarkable in rate and voice, perhaps somewhat decreased production and spontaneity. Stated mood is "good." Affect is euthymic, superficial. TP is linear. TC: without paranoia or evidence of mason delusions, denies SIIP, denies intrusive thoughts, +future oriented. No perceptual disturbances noted or reported. Insight and judgement are fair. Impulse control is intact. 
Pt is a 59yo man with appropriate grooming and hygiene. He is calm and cooperative with good eye contact. No psychomotor abnormalities noted. Speech is normal in rate and volume, spontaneous. Stated mood is "good." Affect is euthymic, full. TP is linear. TC: without paranoia or evidence of mason delusions, denies SIIP, denies intrusive thoughts, +hopeful. No perceptual disturbances noted or reported. Insight and judgement are fair. Impulse control is intact. 
Pt is a 59yo man with appropriate grooming and hygiene. He is calm and cooperative with good eye contact. No psychomotor abnormalities noted. Speech is normal in rate and volume, spontaneous. Stated mood is "good." Affect is euthymic, full. TP is linear. TC: without paranoia or evidence of mason delusions, denies SIIP, denies intrusive thoughts, +hopeful. No perceptual disturbances noted or reported. Insight and judgement are fair. Impulse control is intact. 
Pt is a 57yo man with fair grooming and hygiene. He is calm and cooperative with good eye contact. No psychomotor abnormalities noted. Speech is normal in rate and volume. Stated mood is "good, grateful." Affect is euthymic, full. TP is linear. TC: without paranoia or evidence of mason delusions, denies SIIP, +hopeful. No perceptual disturbances noted or reported. Insight and judgement are fair. Impulse control is intact. 
Pt is a 57yo man with appropriate grooming and hygiene. He is calm and cooperative with good eye contact. No psychomotor abnormalities noted. Speech is normal in rate and volume, spontaneous. Stated mood is "good, grateful." Affect is euthymic, full. TP is linear. TC: without paranoia or evidence of mason delusions, denies SIIP, denies intrusive thoughts, +hopeful. No perceptual disturbances noted or reported. Insight and judgement are fair. Impulse control is intact. 
Pt is a 59yo man with appropriate grooming and hygiene. He is calm and cooperative with good eye contact. No psychomotor abnormalities noted. Speech is normal in rate and volume, spontaneous. Stated mood is "good." Affect is euthymic, full. TP is linear. TC: without paranoia or evidence of mason delusions, denies SIIP, denies intrusive thoughts, +hopeful. No perceptual disturbances noted or reported. Insight and judgement are fair. Impulse control is intact. 
Pt is a 59yo man with fair grooming and hygiene. He is calm and cooperative with good eye contact. No psychomotor abnormalities noted. Speech is normal in rate and volume. Stated mood is "good, grateful." Affect is euthymic, full. TP is linear. TC: without paranoia or evidence of mason delusions, denies SIIP, +hopeful. No perceptual disturbances noted or reported. Insight and judgement are fair. Impulse control is intact. 
Pt umesh 59yo man with fair grooming and hygiene. He is calm and cooperative with good eye contact. No psychomotor abnormalities noted. Speech is normal in rate and volume. Stated mood is "good." Affect is euthymic, full. TP is linear. TC: without paranoia or evidence of mason delusions, denies SIIP, +hopeful. No perceptual disturbances noted. Insight and judgement are fair. Impulse control is intact. 
Pt is a 59yo man with appropriate grooming and hygiene. He is calm and cooperative with good eye contact. No psychomotor abnormalities noted. Speech is normal in rate and volume, spontaneous. Stated mood is "good." Affect is euthymic, full. TP is linear. TC: without paranoia or evidence of amson delusions, denies SIIP, denies intrusive thoughts, +hopeful. No perceptual disturbances noted or reported. Insight and judgement are fair. Impulse control is intact.

## 2024-08-27 NOTE — BH INPATIENT PSYCHIATRY PROGRESS NOTE - NSTXPSYCHODATEEST_PSY_ALL_CORE
09-Aug-2024
09-Aug-2024
26-Jul-2024
09-Aug-2024
26-Jul-2024
09-Aug-2024
26-Jul-2024
09-Aug-2024
26-Jul-2024
26-Jul-2024
09-Aug-2024
26-Jul-2024

## 2024-08-27 NOTE — BH INPATIENT PSYCHIATRY PROGRESS NOTE - NSICDXBHPRIMARYDX_PSY_ALL_CORE
Schizoaffective disorder, depressive type   F25.1  
Schizoaffective disorder   F25.9  
Schizoaffective disorder   F25.9  
Schizoaffective disorder, depressive type   F25.1  
Schizoaffective disorder, depressive type   F25.1  
Schizoaffective disorder   F25.9  
Schizoaffective disorder   F25.9  
Schizoaffective disorder, depressive type   F25.1  
Schizoaffective disorder   F25.9  
Schizoaffective disorder, depressive type   F25.1  
Schizoaffective disorder   F25.9  
Schizoaffective disorder, depressive type   F25.1  
Schizoaffective disorder   F25.9  
Schizoaffective disorder   F25.9  
Schizoaffective disorder, depressive type   F25.1  
Schizoaffective disorder   F25.9  

## 2024-08-27 NOTE — BH INPATIENT PSYCHIATRY PROGRESS NOTE - NSTXPROBDCSOC_PSY_ALL_CORE
DISCHARGE ISSUE - POOR SOCIALIZATION IN COMMUNITY

## 2024-08-27 NOTE — BH INPATIENT PSYCHIATRY PROGRESS NOTE - NSTXSUICIDGOAL_PSY_ALL_CORE
Be able to state 3 reasons for living

## 2024-08-27 NOTE — BH INPATIENT PSYCHIATRY PROGRESS NOTE - NSTXIMPULSINTERMD_PSY_ALL_CORE
psychopharmacology

## 2024-08-27 NOTE — BH INPATIENT PSYCHIATRY PROGRESS NOTE - NSBHASSESSSUMMFT_PSY_ALL_CORE
Pt is 57yo single man with PPHx of dx schoizoaffective d/o, hx of many prior hospitalizations (last at [facility] June 2024), in outpt tx at [facility] AOPD, w/ PMHx HTN, HLD, hypothyroidism, and CVID/ hypogammaglobulinemia (on monthly IVIG - last doses 6/16/24 and 7/24/24), COPD not on home O2, hx of frequent skin infection who presented initially to [facility] for SA by overdose of labetalol related to paranoia and fear, found to have left leg abscess vs cellulitis and was transferred to [facility] on 7/22/24 for IVIG infusions (received 7/24/24), followed by psych CL, then medically cleared and transferred to [facility] on 7/26/24 for further psychiatric treatment.    No interval change.  reported continued improvement in mood and psychosis.   Continues to deny SIIP.  Scheduled for Haldol Dec 200mg BELLA  tomorrow followed by discharge.  e-scripts sent to [facility].  Requires continued home care as noted.    Plan:  Psych:  - S/p Haldol Dec 200mg IM on 8/7/24, next due 8/28/24 (switched from [id number] to [id number])  - C/w Cogentin 1mg qhs  - C/w Lexapro 10mg daily     Medical:  - CVID: IVIG infusion q3.5weeks (last 8/15)  - HTN: Norvasc 10mg daily, Labetalol 200mg BID   - HLD: Lipitor 40mg qhs  - Hypothyroidism: Synthroid 50mcg daily  - Hyponatremia: NaCl tabs TID, BMP on 8/16 within normal limits  - BPH: Flomax 0.4mg qhs  - Prediabetes (with A1C 5.8): Metformin 500mg BID  - Left thigh wound - s/p treatment at [facility] with antibiotics (seen by ID and wound care), healing with continued wound care  - Hand rash: improved, c/w cortisone BID prn  - Neck pain: resolved, lidocaine patch prn, s/p imaging on 8/9 with no acute findings    - Conjunctivitis: resolved, s/p course of Tobramycin drops      Dispo:  - s/p family meeting w/ sisters, housing, and  on 8/16  - Refer to [facility] program, ELIAN setting up home care and other services    Upon discharge f/u at [facility] [address] [facility] [phone]

## 2024-08-27 NOTE — BH INPATIENT PSYCHIATRY PROGRESS NOTE - NSTXPSYCHOGOAL_PSY_ALL_CORE
Will engage in a 15 minute conversation with no irrational content

## 2024-08-27 NOTE — BH INPATIENT PSYCHIATRY PROGRESS NOTE - NSICDXBHSECONDARYDX_PSY_ALL_CORE
CVID (common variable immunodeficiency)   D83.9  HTN (hypertension)   I10  HLD (hyperlipidemia)   E78.5  Hypothyroidism   E03.9  
CVID (common variable immunodeficiency)   D83.9  HTN (hypertension)   I10  HLD (hyperlipidemia)   E78.5  Hypothyroidism   E03.9  
Suicide attempt   T14.91XA  
CVID (common variable immunodeficiency)   D83.9  HTN (hypertension)   I10  HLD (hyperlipidemia)   E78.5  Hypothyroidism   E03.9  
Suicide attempt   T14.91XA  
Suicide attempt   T14.91XA  
CVID (common variable immunodeficiency)   D83.9  HTN (hypertension)   I10  HLD (hyperlipidemia)   E78.5  Hypothyroidism   E03.9  
Suicide attempt   T14.91XA  
CVID (common variable immunodeficiency)   D83.9  HTN (hypertension)   I10  HLD (hyperlipidemia)   E78.5  Hypothyroidism   E03.9  
Suicide attempt   T14.91XA  
CVID (common variable immunodeficiency)   D83.9  HTN (hypertension)   I10  HLD (hyperlipidemia)   E78.5  Hypothyroidism   E03.9  
Suicide attempt   T14.91XA  
Suicide attempt   T14.91XA  
CVID (common variable immunodeficiency)   D83.9  HTN (hypertension)   I10  HLD (hyperlipidemia)   E78.5  Hypothyroidism   E03.9  
Suicide attempt   T14.91XA  
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
## 2024-08-27 NOTE — BH INPATIENT PSYCHIATRY PROGRESS NOTE - NSTXOTHERPROGRES_PSY_ALL_CORE
Improving
Met - goal discontinued
Improving
No Change
Improving

## 2024-08-27 NOTE — BH INPATIENT PSYCHIATRY PROGRESS NOTE - NSDCCRITERIA_PSY_ALL_CORE
CGI < 3 

## 2024-08-27 NOTE — BH INPATIENT PSYCHIATRY PROGRESS NOTE - NSTXDCSOCGOAL_PSY_ALL_CORE
Will accept referrals to support groups, clubhouse, senior centers, etc.

## 2024-08-27 NOTE — BH INPATIENT PSYCHIATRY PROGRESS NOTE - NSTXIMPULSGOAL_PSY_ALL_CORE
Will identify 1 strategy to interrupt impulsive thoughts

## 2024-08-27 NOTE — BH INPATIENT PSYCHIATRY PROGRESS NOTE - NSTXPSYCHODATETRGT_PSY_ALL_CORE
02-Aug-2024
16-Aug-2024
23-Aug-2024
16-Aug-2024
23-Aug-2024
23-Aug-2024
16-Aug-2024
16-Aug-2024
02-Aug-2024
02-Aug-2024
09-Aug-2024
02-Aug-2024
09-Aug-2024
23-Aug-2024
23-Aug-2024
16-Aug-2024
23-Aug-2024
02-Aug-2024

## 2024-08-27 NOTE — BH INPATIENT PSYCHIATRY PROGRESS NOTE - NSTXOTHERDATEEST_PSY_ALL_CORE
28-Jul-2024

## 2024-08-28 VITALS — TEMPERATURE: 98 F

## 2024-08-28 PROCEDURE — 99232 SBSQ HOSP IP/OBS MODERATE 35: CPT

## 2024-08-28 RX ADMIN — Medication 200 MILLIGRAM(S): at 10:07

## 2024-08-28 RX ADMIN — Medication 50 MICROGRAM(S): at 06:26

## 2024-08-28 RX ADMIN — Medication 1 APPLICATION(S): at 12:23

## 2024-08-28 RX ADMIN — SODIUM CHLORIDE 1 GRAM(S): 9 INJECTION INTRAMUSCULAR; INTRAVENOUS; SUBCUTANEOUS at 12:23

## 2024-08-28 RX ADMIN — ESCITALOPRAM OXALATE 10 MILLIGRAM(S): 10 TABLET ORAL at 08:28

## 2024-08-28 RX ADMIN — SODIUM CHLORIDE 1 GRAM(S): 9 INJECTION INTRAMUSCULAR; INTRAVENOUS; SUBCUTANEOUS at 08:28

## 2024-08-28 RX ADMIN — METFORMIN HYDROCHLORIDE 500 MILLIGRAM(S): 850 TABLET, FILM COATED ORAL at 08:28

## 2024-08-28 NOTE — ADVANCED PRACTICE NURSE CONSULT - ASSESSMENT
Patient is a 57 yo Male with PMHx schizoaffective disorder HTN, HLD, hypothyroidism, CVID/ hypogammaglobulinemia (on monthly IVIG - Last dose July 23, 2024, follows w/ Dr. Mitchell Boxer), COPD, frequent skin infection (MRSA abscess / cellulitis w/ MRSA bacteremia s/p debridement 6/2023) presented to [facility] s/p overdose (labetalol), incidentally found to have left leg  cellulitis, started on abx and transferred to [facility] for IVIG infusions after medical optimized was transfer to [facility] for inpatient psychiatry.  Consulted for wound care recommendation for discharge.  left Hip wound-heal no additional treatment.  Left leg wound- measuring 2.4 cm x 2.4 cm x 0.0 cm- wound base with epithelialized tissue, induration, no drainage no pain-no wound care is needed but recommend infectious f/u  Right hand with eczema-redness and mild edema noted between the second, third and fourth digits-continue applying cortisone cream as prescribed  Occipital boil-patient reported pain when touch-red bump with yellow tip, eventually will need to drain out-recommend applying warm compress to relieve pain and promote naturally drainage twice daily.

## 2024-08-28 NOTE — ADVANCED PRACTICE NURSE CONSULT - REASON FOR CONSULT
Notified patient at this time of yeast and positive trich. Rx has been sent in   Wound care f/u and recommendation for discharge

## 2024-08-28 NOTE — PROGRESS NOTE ADULT - ASSESSMENT
57 yo Male with PMHx schizoaffective disorder (bipolar type, w/ multiple [facility] admissions), HTN, HLD, hypothyroidism, CVID/ hypogammaglobulinemia (on monthly IVIG), COPD not on home O2, hx of frequent skin infection (MRSA abscess / cellulitis w/ MRSA bacteremia s/p debridement 6/2023) presented to [facility] s/p overdose (labetalol), incidentally found to have left leg cellulitis. Now s/p abx and IVIG infusions for CVID. Admitted to [facility] with plan for discharge today. Consulted for discharge recs in regard to antihypertensives       #HTN- Pt currently ordered to be on labetalol 200mg BID and amlodipine 10mg QD. However, he has not received these medications over the past couple days based on soft BP readings and hold parameters   -Previously on ARB. Which has been held during his course due to TASH. Continue to hold this medication on discharge   -Pt ordered for labetalol 200mg BID and amlodipine 10mg QD. However, he has not received these medications over the past couple days based on soft BP readings and hold parameters. Would recommend holding these medications on discharge. However, pt should follow up with a PCP with in 1 week of discharge. Would also recommend pt monitor his BP at home if he has access to or able to obtain a BP monitoring device.   -Pt on flomax for BPH which could also decrease BP, however would continue this medication on discharge       #Cellulitis of the left leg -[facility] CT hip without evidence of fluid collection or CT evidence of osteo. S/p course of Vancomycin then Transitioned to doxycycline 100mg BID , last day 7/28. Bcx NGTD   -Pt seen by wound care nurse who has provided recs. Please follow up most recent wound care notes and recommendations from today     #Common variable immunodeficiency- CVID, diagnosed @ 19, follows Dr. Mitchell Boxer. Monthly IVIG . S/p IVIG x1 at [facility] 07/23  -c/w outpatient follow up    Acute kidney injury- Improved as of latest labs on 8/16. Per chart review it was Possibly i/s/o overdose, U/A w/ mild spot proteinuria otherwise unremarkable and renal/bladder U/S: no hydronephrosis   - Continue to hold home ARB for now   - Avoid nephrotoxins/ renally dose meds  -Outpt follow up    #HLD  -C/w atorvastatin 40mg QD    #Hypothyroidism  -c/w levothyroxine 50mcg QD    #Attempted suicide/ Schizoaffective disorder  -Mgt per primary psych team. Case d/w psych attending today and plan is for discharge today

## 2024-08-28 NOTE — ADVANCED PRACTICE NURSE CONSULT - RECOMMEDATIONS
Patient will need to f/u with PCP and infectious disease within a week of discharge to assess the occipital boil, eczema and diabetes management.  
Please continue wound care recommendations:  Topical recommendations for left thigh wound- cleanse wound and Periwound skin with NS. Apply Liquid barrier film to Periwound skin. Apply Medihoney gel to wound base, cover with Allevyn dressing and change daily and prn if soiled/compromised.  Left leg wound- Keep open to air.  Monitor for S&S of infections.    Upon discharge f/u at [facility] [address] [facility] [phone]    
Patient

## 2024-08-28 NOTE — PROGRESS NOTE ADULT - SUBJECTIVE AND OBJECTIVE BOX
SUBJECTIVE / OVERNIGHT EVENTS:    MEDICATIONS  (STANDING):  amLODIPine   Tablet 10 milliGRAM(s) Oral daily  atorvastatin 40 milliGRAM(s) Oral at bedtime  benztropine 1 milliGRAM(s) Oral at bedtime  escitalopram 10 milliGRAM(s) Oral daily  glucagon  Injectable 1 milliGRAM(s) IntraMuscular once  labetalol 200 milliGRAM(s) Oral two times a day  levothyroxine 50 MICROGram(s) Oral daily  metFORMIN 500 milliGRAM(s) Oral two times a day  sodium chloride 1 Gram(s) Oral three times a day  tamsulosin 0.4 milliGRAM(s) Oral at bedtime    MEDICATIONS  (PRN):  acetaminophen     Tablet .. 650 milliGRAM(s) Oral every 6 hours PRN Temp greater or equal to 38C (100.4F), Mild Pain (1 - 3)  aluminum hydroxide/magnesium hydroxide/simethicone Suspension 30 milliLiter(s) Oral every 6 hours PRN Dyspepsia  chlorproMAZINE    Injectable 50 milliGRAM(s) IntraMuscular once PRN agitation  haloperidol     Tablet 2.5 milliGRAM(s) Oral every 6 hours PRN psychosis  hydrocortisone 1% Cream 1 Application(s) Topical two times a day PRN rash on fingers  lidocaine   4% Patch 1 Patch Transdermal daily PRN cervical pain  LORazepam     Tablet 1 milliGRAM(s) Oral every 6 hours PRN Anxiety  LORazepam   Injectable 2 milliGRAM(s) IntraMuscular once PRN Agitation  magnesium hydroxide Suspension 30 milliLiter(s) Oral daily PRN Constipation  melatonin. 3 milliGRAM(s) Oral at bedtime PRN Insomnia  polyethylene glycol 3350 17 Gram(s) Oral daily PRN constipation  traZODone 50 milliGRAM(s) Oral at bedtime PRN insomnia        CAPILLARY BLOOD GLUCOSE      PHYSICAL EXAM:  ICU Vital Signs Last 24 Hrs  T(C): 36.6 (28 Aug 2024 07:50), Max: 36.6 (27 Aug 2024 19:20)  T(F): 97.8 (28 Aug 2024 07:50), Max: 97.9 (27 Aug 2024 19:20)  HR: --66 (28 Aug 2024 07:50)  BP: --117/74(28 Aug 2024 07:50)    GENERAL: NAD  HEAD:  Atraumatic,   EYES: EOMI, conjunctiva and sclera clear  NECK: Supple  CHEST/LUNG: Clear to auscultation bilaterally; No wheeze, rhonchi, crackles or rales  HEART: Regular rate and rhythm; No murmurs, rubs, or gallops  ABDOMEN: Soft, Nontender, Nondistended; Bowel sounds present  EXTREMITIES: No edema  PSYCH: Calm and pleasant. Answers questions appropriately   NEUROLOGY: non-focal  SKIN: +L lateral thigh w/ dressing c/d/i. +L Lateral shin w/ chronic/ healed wound. No discharge , drainage or erythema. Occipital nodule/boil with scab at tip that - > No erythema or discharge appreciated     LABS:      RADIOLOGY & ADDITIONAL TESTS:    Imaging Personally Reviewed:    Consultant(s) Notes Reviewed:      Care Discussed with Consultants/Other Providers:

## 2024-09-02 ENCOUNTER — INPATIENT (INPATIENT)
Facility: HOSPITAL | Age: 59
LOS: 8 days | Discharge: PSYCHIATRIC OR SUBSTANCE USE FACILITY | End: 2024-09-11
Attending: HOSPITALIST | Admitting: HOSPITALIST
Payer: MEDICARE

## 2024-09-02 VITALS
OXYGEN SATURATION: 99 % | RESPIRATION RATE: 16 BRPM | HEIGHT: 74 IN | TEMPERATURE: 98 F | WEIGHT: 246.04 LBS | DIASTOLIC BLOOD PRESSURE: 91 MMHG | HEART RATE: 75 BPM | SYSTOLIC BLOOD PRESSURE: 150 MMHG

## 2024-09-02 DIAGNOSIS — T44.7X2A POISONING BY BETA-ADRENORECEPTOR ANTAGONISTS, INTENTIONAL SELF-HARM, INITIAL ENCOUNTER: ICD-10-CM

## 2024-09-02 DIAGNOSIS — J34.2 DEVIATED NASAL SEPTUM: Chronic | ICD-10-CM

## 2024-09-02 DIAGNOSIS — K08.199 COMPLETE LOSS OF TEETH DUE TO OTHER SPECIFIED CAUSE, UNSPECIFIED CLASS: Chronic | ICD-10-CM

## 2024-09-02 LAB
ALBUMIN SERPL ELPH-MCNC: 4.3 G/DL — SIGNIFICANT CHANGE UP (ref 3.3–5)
ALP SERPL-CCNC: 115 U/L — SIGNIFICANT CHANGE UP (ref 40–120)
ALT FLD-CCNC: 14 U/L — SIGNIFICANT CHANGE UP (ref 4–41)
ANION GAP SERPL CALC-SCNC: 11 MMOL/L — SIGNIFICANT CHANGE UP (ref 7–14)
APAP SERPL-MCNC: <10 UG/ML — LOW (ref 15–25)
AST SERPL-CCNC: 16 U/L — SIGNIFICANT CHANGE UP (ref 4–40)
BASOPHILS # BLD AUTO: 0.02 K/UL — SIGNIFICANT CHANGE UP (ref 0–0.2)
BASOPHILS NFR BLD AUTO: 0.2 % — SIGNIFICANT CHANGE UP (ref 0–2)
BILIRUB SERPL-MCNC: <0.2 MG/DL — SIGNIFICANT CHANGE UP (ref 0.2–1.2)
BLOOD GAS VENOUS COMPREHENSIVE RESULT: SIGNIFICANT CHANGE UP
BUN SERPL-MCNC: 12 MG/DL — SIGNIFICANT CHANGE UP (ref 7–23)
CALCIUM SERPL-MCNC: 9.5 MG/DL — SIGNIFICANT CHANGE UP (ref 8.4–10.5)
CHLORIDE SERPL-SCNC: 99 MMOL/L — SIGNIFICANT CHANGE UP (ref 98–107)
CO2 SERPL-SCNC: 23 MMOL/L — SIGNIFICANT CHANGE UP (ref 22–31)
CREAT SERPL-MCNC: 1.03 MG/DL — SIGNIFICANT CHANGE UP (ref 0.5–1.3)
EGFR: 85 ML/MIN/1.73M2 — SIGNIFICANT CHANGE UP
EOSINOPHIL # BLD AUTO: 0.12 K/UL — SIGNIFICANT CHANGE UP (ref 0–0.5)
EOSINOPHIL NFR BLD AUTO: 1.3 % — SIGNIFICANT CHANGE UP (ref 0–6)
ETHANOL SERPL-MCNC: <10 MG/DL — SIGNIFICANT CHANGE UP
FLUAV AG NPH QL: SIGNIFICANT CHANGE UP
FLUBV AG NPH QL: SIGNIFICANT CHANGE UP
GLUCOSE SERPL-MCNC: 137 MG/DL — HIGH (ref 70–99)
HCT VFR BLD CALC: 33.6 % — LOW (ref 39–50)
HGB BLD-MCNC: 11.5 G/DL — LOW (ref 13–17)
IANC: 8.14 K/UL — HIGH (ref 1.8–7.4)
IMM GRANULOCYTES NFR BLD AUTO: 0.2 % — SIGNIFICANT CHANGE UP (ref 0–0.9)
LYMPHOCYTES # BLD AUTO: 0.75 K/UL — LOW (ref 1–3.3)
LYMPHOCYTES # BLD AUTO: 7.8 % — LOW (ref 13–44)
MCHC RBC-ENTMCNC: 28.3 PG — SIGNIFICANT CHANGE UP (ref 27–34)
MCHC RBC-ENTMCNC: 34.2 GM/DL — SIGNIFICANT CHANGE UP (ref 32–36)
MCV RBC AUTO: 82.8 FL — SIGNIFICANT CHANGE UP (ref 80–100)
MONOCYTES # BLD AUTO: 0.53 K/UL — SIGNIFICANT CHANGE UP (ref 0–0.9)
MONOCYTES NFR BLD AUTO: 5.5 % — SIGNIFICANT CHANGE UP (ref 2–14)
NEUTROPHILS # BLD AUTO: 8.14 K/UL — HIGH (ref 1.8–7.4)
NEUTROPHILS NFR BLD AUTO: 85 % — HIGH (ref 43–77)
NRBC # BLD: 0 /100 WBCS — SIGNIFICANT CHANGE UP (ref 0–0)
NRBC # FLD: 0 K/UL — SIGNIFICANT CHANGE UP (ref 0–0)
PLATELET # BLD AUTO: 176 K/UL — SIGNIFICANT CHANGE UP (ref 150–400)
POTASSIUM SERPL-MCNC: 4.1 MMOL/L — SIGNIFICANT CHANGE UP (ref 3.5–5.3)
POTASSIUM SERPL-SCNC: 4.1 MMOL/L — SIGNIFICANT CHANGE UP (ref 3.5–5.3)
PROT SERPL-MCNC: 7.4 G/DL — SIGNIFICANT CHANGE UP (ref 6–8.3)
RBC # BLD: 4.06 M/UL — LOW (ref 4.2–5.8)
RBC # FLD: 14.7 % — HIGH (ref 10.3–14.5)
RSV RNA NPH QL NAA+NON-PROBE: SIGNIFICANT CHANGE UP
SALICYLATES SERPL-MCNC: <0.3 MG/DL — LOW (ref 15–30)
SARS-COV-2 RNA SPEC QL NAA+PROBE: SIGNIFICANT CHANGE UP
SODIUM SERPL-SCNC: 133 MMOL/L — LOW (ref 135–145)
WBC # BLD: 9.58 K/UL — SIGNIFICANT CHANGE UP (ref 3.8–10.5)
WBC # FLD AUTO: 9.58 K/UL — SIGNIFICANT CHANGE UP (ref 3.8–10.5)

## 2024-09-02 PROCEDURE — 99291 CRITICAL CARE FIRST HOUR: CPT | Mod: GC

## 2024-09-02 RX ORDER — ACTIVATED CHARCOAL/SORBITOL 25 G
50 LIQUID (ML) ORAL ONCE
Refills: 0 | Status: COMPLETED | OUTPATIENT
Start: 2024-09-02 | End: 2024-09-02

## 2024-09-02 RX ORDER — CALCIUM GLUCONATE 61(648) MG
3 TABLET ORAL ONCE
Refills: 0 | Status: COMPLETED | OUTPATIENT
Start: 2024-09-02 | End: 2024-09-02

## 2024-09-02 RX ORDER — GLUCAGON INJECTION, SOLUTION 1 MG/.2ML
5 INJECTION, SOLUTION SUBCUTANEOUS ONCE
Refills: 0 | Status: COMPLETED | OUTPATIENT
Start: 2024-09-02 | End: 2024-09-02

## 2024-09-02 RX ORDER — ONDANSETRON 2 MG/ML
4 INJECTION, SOLUTION INTRAMUSCULAR; INTRAVENOUS ONCE
Refills: 0 | Status: COMPLETED | OUTPATIENT
Start: 2024-09-02 | End: 2024-09-02

## 2024-09-02 RX ORDER — SODIUM CHLORIDE 9 MG/ML
1000 INJECTION INTRAMUSCULAR; INTRAVENOUS; SUBCUTANEOUS ONCE
Refills: 0 | Status: COMPLETED | OUTPATIENT
Start: 2024-09-02 | End: 2024-09-02

## 2024-09-02 RX ADMIN — ONDANSETRON 4 MILLIGRAM(S): 2 INJECTION, SOLUTION INTRAMUSCULAR; INTRAVENOUS at 22:54

## 2024-09-02 RX ADMIN — SODIUM CHLORIDE 1000 MILLILITER(S): 9 INJECTION INTRAMUSCULAR; INTRAVENOUS; SUBCUTANEOUS at 22:55

## 2024-09-02 RX ADMIN — GLUCAGON INJECTION, SOLUTION 5 MILLIGRAM(S): 1 INJECTION, SOLUTION SUBCUTANEOUS at 22:53

## 2024-09-02 RX ADMIN — Medication 100 GRAM(S): at 22:54

## 2024-09-02 NOTE — ED ADULT TRIAGE NOTE - CHIEF COMPLAINT QUOTE
pt presents to ED for suicidal attempt. states he took 20 of his 200mg labetalol tablets 30 minutes prior. pt denies chest pain. states he attempted to induce vomiting but nothing came up.

## 2024-09-02 NOTE — CONSULT NOTE ADULT - ASSESSMENT
58 year old male schizoaffective disorder (bipolar type, w/ multiple [facility] admissions), HTN, HLD, hypothyroidism, CVID/ hypogammaglobulinemia (on monthly IVID) presenting for labetalol overdose, VS on admission  HR 72, /73, EKG NSR, labs no significant abnormalities.   In the ED - given 1L bolus, calcium gluconate, activated charcoal, glucagon.    Recommendations  - Currently patient is fatigue but hemodynamically stable and asymptomatic. No indication for admission to the MICU.    - Monitor on telemetry, BP consult for SI   - Rest of care as per Toxicology recommendations     Seen and Discussed with Dr. Tam

## 2024-09-02 NOTE — ED ADULT NURSE NOTE - NSFALLUNIVINTERV_ED_ALL_ED
Bed/Stretcher in lowest position, wheels locked, appropriate side rails in place/Call bell, personal items and telephone in reach/Instruct patient to call for assistance before getting out of bed/chair/stretcher/Non-slip footwear applied when patient is off stretcher/[place] to call system/Physically safe environment - no spills, clutter or unnecessary equipment/Purposeful proactive rounding/Room/bathroom lighting operational, light cord in reach
"reversal of Ileostomy"

## 2024-09-02 NOTE — ED PROVIDER NOTE - PROGRESS NOTE DETAILS
Toxicologies on board initially recommended 50 g of charcoal but due to patient's altered mental status and worsening vitals with blood pressure becoming more soft held off on charcoal.  Spoke with toxicology again who advised to give 5 mg IV glucagon, 3 g calcium gluconate, fluids.  Ordered this for patient.  Put in ICU consult due to patient's worsening vitals.  Will continue to monitor patient.  Currently is more sleepy and somnolent but airway still protected. ICU saw patient.  State he looks well at this time.  No need for ICU admission currently.  Will check on patient tonight or if he decompensates. Reassessed patient again BP of 113 systolic and heart rate of 64.  Patient is sleeping but is not in distress.  If there are no further changes patient will be admitted to medicine at 1 AM after evaluating him for 4 hours here in the emergency department and 6 hours total after overdose.

## 2024-09-02 NOTE — ED PROVIDER NOTE - CLINICAL SUMMARY MEDICAL DECISION MAKING FREE TEXT BOX
58-year-old male past medical history of immunocompromise state (CVID) on Gammagard SD gammaglobulin, multiple MRSA infections, and suicidal ideation with attempted suicide presents to the emergency department due to overdose of labetalol during suicidal attempt.  Patient states he took 20 labetalol, unsure of dose.  Sister states patient said prior to her finding him that he took 40 but then changed to 20.  Patient was altered from baseline upon assessment.  A&O x 4 but appeared somnolent.  Dozing off while speaking with him.  Vital stable with heart rate in the high 60s to low 70s,  systolic, with an O2 sat of 97%. 58-year-old male past medical history of immunocompromise state (CVID) on Gammagard SD gammaglobulin, multiple MRSA infections, and suicidal ideation with attempted suicide presents to the emergency department due to overdose of labetalol during suicidal attempt.  Patient states he took 20 labetalol, unsure of dose.  Sister states patient said prior to her finding him that he took 40 but then changed to 20.  Patient was altered from baseline upon assessment.  A&O x 4 but appeared somnolent.  Dozing off while speaking with him.  Vital stable with heart rate in the high 60s to low 70s,  systolic, with an O2 sat of 97%. Due to potential for beta-blocker overdose will monitor patient on telemetry.  Will consult tox for further recommendation and management.  Will order labs, tox screen, psych clearance orders for further evaluation. 58-year-old male past medical history of immunocompromise state (CVID) on Gammagard SD gammaglobulin, multiple MRSA infections, and suicidal ideation with attempted suicide presents to the emergency department due to overdose of labetalol during suicidal attempt.  Patient states he took 20 labetalol, unsure of dose.  Sister states patient said prior to her finding him that he took 40 but then changed to 20.  Patient was altered from baseline upon assessment.  A&O x 4 but appeared somnolent.  Dozing off while speaking with him.  Vital stable with heart rate in the high 60s to low 70s,  systolic, with an O2 sat of 97%. Due to potential for beta-blocker overdose will monitor patient on telemetry.  Will consult tox for further recommendation and management.  Will order labs, tox screen, psych clearance orders for further evaluation. Anticipate admission for medical clearance or suicidal ideation and psych eval.

## 2024-09-02 NOTE — ED PROVIDER NOTE - PHYSICAL EXAMINATION
Gen: No acute distress, somnolent  HEENT: Pupils reactive, EOMI, no nasal discharge  CV: RRR, +S1/S2, no M/R/G, 2+ radial pulses b/l  Resp: CTAB, no W/R/R, no accessory muscle use, no increased work of breathing  GI: Abdomen soft non-distended, No tenderness to palpation.   MSK: No open wounds, no bruising, no LE edema  Neuro: A&Ox4 but is somnolent, following commands  Psych: appropriate mood Gen: No acute distress, somnolent  HEENT: Pupils reactive, EOMI, no nasal discharge  CV: RRR, +S1/S2, 2+ radial pulses b/l  Resp: CTAB, no W/R/R, no accessory muscle use, no increased work of breathing  GI: Abdomen soft non-distended, No tenderness to palpation.   MSK: No open wounds, no bruising, no LE edema  Neuro: A&Ox4 but is somnolent, following commands  Psych: appropriate mood

## 2024-09-02 NOTE — ED PROVIDER NOTE - ATTENDING CONTRIBUTION TO CARE
I have personally performed a face to face medical and diagnostic evaluation of the patient. I have discussed with and reviewed the Resident's note and agree with the History, ROS, Physical Exam and MDM unless otherwise indicated. A brief summary of my personal evaluation and impression can be found below.      Abimbola CABRERA: 58-year-old male history of immunocompromise state, multiple MRSA infections suicidal ideation in the past, prior inpatient psychiatric admissions, emotional distress secondary to emotional traumatic event, presents with a chief complaint of episode of SI with ingestion of between 20 to 4200 mg of labetalol 30 minutes prior to ED arrival.  Patient denies any physical complaints at this time but reports that he feels "scared.  And just unwell.  Denies any nausea vomiting fever chest pain trouble breathing urinary or bowel complaints he can move everything and for everything.    All other ROS negative, except as above and as per HPI and ROS section.    VITALS: Initial triage and subsequent vitals have been reviewed by me.  GEN APPEARANCE: Easily arousable but Lethargic appearing.  No distress.  HEAD: Atraumatic.  EYES: PERRLa, EOMI, vision grossly intact.   NECK: Supple  CV: RRR, S1S2, no c/r/m/g. Cap refill <2 seconds. No bruits.   LUNGS: CTAB. No abnormal breath sounds.  ABDOMEN: Soft, NTND. No guarding or rebound.   MSK/EXT: No spinal or extremity point tenderness. No CVA ttp. Pelvis stable. No peripheral edema.  NEURO: Alert, follows commands. . Speech normal. Sensation and motor normal x4 extremities.   SKIN: Warm, dry and intact. No rash.  PSYCH: Sleepy appearing    Plan/MDM: Exam his vitals are stable upon ED arrival nontoxic-appearing but does appear lethargic with physical exam as above, DDx concern for possible beta-blocker toxicity given attempt of SI earlier this evening, will place one-to-one, discussed with toxicology, check labs EKG, give meds as needed, monitor closely, patient will require admission anticipate likely medical admission as anticipate he will likely be medically cleared for some time, will require consultants liaison psychiatry to follow while inpatient.

## 2024-09-02 NOTE — ED PROVIDER NOTE - OBJECTIVE STATEMENT
58-year-old male past medical history of immunocompromise state on Gammagard SD gammaglobulin, multiple MRSA infections, and suicidal ideation with attempted suicide presents to the emergency department due to overdose of labetalol during suicidal attempt.  Patient states he took 20 labetalol, unsure of dose.  Sister states patient said prior to her finding him that he took 40 but then changed to 20.  Sister and patient stated he did not take any other medications with the labetalol.  His previous attempt use labetalol as well but states that he took way more than.  He has been in and out of [facility] due to psych issues.  Patient denies any chest pain, shortness of breath, fever, nausea, vomiting.  Patient states he feels tired and wants to sleep.  Sister states that he is not acting like himself and has been more tired than usual. 58-year-old male past medical history of immunocompromise state (CVID) on Gammagard SD gammaglobulin, multiple MRSA infections, and suicidal ideation with attempted suicide presents to the emergency department due to overdose of labetalol during suicidal attempt.  Patient states he took 20 labetalol, unsure of dose.  Sister states patient said prior to her finding him that he took 40 but then changed to 20.  Sister and patient stated he did not take any other medications with the labetalol.  His previous attempt use labetalol as well but states that he took way more than.  He has been in and out of [facility] due to psych issues.  Patient denies any chest pain, shortness of breath, fever, nausea, vomiting.  Patient states he feels tired and wants to sleep.  Sister states that he is not acting like himself and has been more tired than usual. 58-year-old male past medical history of immunocompromise state (CVID) on Gammagard SD gammaglobulin, multiple MRSA infections, and suicidal ideation with attempted suicide presents to the emergency department due to overdose of labetalol during suicidal attempt.  Patient states he took 20 labetalol, unsure of dose.  Sister states patient said prior to her finding him that he took 40 but then changed to 20.  Sister and patient stated he did not take any other medications with the labetalol.  His previous attempt use labetalol as well but states that he took way more than.  He has been in and out of [facility] due to psych issues.  Patient denies any chest pain, shortness of breath, fever, nausea, vomiting.  Patient states he feels tired and wants to sleep.  Sister states that he is not acting like himself and has been more tired than usual. Patient says he felt scared at home and still feels scared here. He intentionally wanted to die and still does not want to be alive anymore.

## 2024-09-02 NOTE — CONSULT NOTE ADULT - PROBLEM SELECTOR RECOMMENDATION 9
Beta-1-adrenergic antagonist toxicity can cause severe bradycardia, heart block, and/or hypotension. Hypoglycemia can be seen. In patients with asthma, non-selective beta adrenergic antagonism may provoke bronchospasm. Treatment options include calcium gluconate, glucagon, and high-dose insulin.    #Recommendations  - Supportive care, please observe for hypotension, bradycardia and depressed mental status.   - Obtain routine toxicological labs including CBC, CMP, serum acetaminophen, salicylate, ethanol, and EKG.   - If patient becomes bradycardic or hypotensive recommend fluids, 5mg of glucagon and 3g of calcium gluconate.   - If no response, recommend pressors. If requiring escalating doses of pressors, please call back to discuss high dose insulin.   - If patient becomes symptomatic, recommend ICU admission.   - If patient stays asymptomatic for 6 hours after ingestion, recommend admission to medicine on telemetry as he potentially has access to amlodipine which can cause delayed hypotension.   - Further medical and/or psychiatric care per primary team    Thank you for involving us in the care of this patient. Assessment and plan discussed with toxicology attending Dr. Adithya Panda. Please do not hesitate to reach out to the toxicology team for any further questions or concerns.    The On-Call Toxicology Fellow can be reached 24/7 via Pager #[phone]  Please send a 10 digit call back # as [place] cover multiple hospitals    Srikanth Gaspar MD  Toxicology Fellow  PGY-4 Beta-1-adrenergic antagonist toxicity can cause severe bradycardia, heart block, and/or hypotension. Hypoglycemia can be seen. In patients with asthma, non-selective beta adrenergic antagonism may provoke bronchospasm. Treatment options include calcium gluconate, glucagon, and high-dose insulin.    #Recommendations  - Supportive care, please observe for hypotension, bradycardia and depressed mental status.   - Obtain routine toxicological labs including CBC, CMP, serum acetaminophen, salicylate, ethanol, and EKG.   - Recommend 50g activated charcoal now if patient willing to take and   - If patient becomes bradycardic or hypotensive recommend fluids, 5mg of glucagon and 3g of calcium gluconate.   - If no response, recommend pressors. If requiring escalating doses of pressors, please call back to discuss high dose insulin.   - If patient becomes symptomatic, recommend ICU admission.   - If patient stays asymptomatic for 6 hours after ingestion, recommend admission to medicine on telemetry as he potentially has access to amlodipine which can cause delayed hypotension.   - Further medical and/or psychiatric care per primary team    Thank you for involving us in the care of this patient. Assessment and plan discussed with toxicology attending Dr. Adithya Panda. Please do not hesitate to reach out to the toxicology team for any further questions or concerns.    The On-Call Toxicology Fellow can be reached 24/7 via Pager #[phone]  Please send a 10 digit call back # as [place] cover multiple hospitals    Srikanth Gaspar MD  Toxicology Fellow  PGY-4

## 2024-09-02 NOTE — CONSULT NOTE ADULT - ASSESSMENT
MEDICAL TOXICOLOGY CONSULT    HPI:    This is a 58 year old male presenting for labetalol overdose. Per ED, around 7pm patient reports taking 20-40 200mg labetalol with SI. He is also prescribed escitalopram, amlodipine, levothyroxine, benztropine and one other unclear medication. He denies taking any other pills. Per sister he is more sleepy than usual. His vitals are normal with HR 72, /73. On exam he is reported as fatigued appearing but arouses to voice, normal level of orientation, pupils 3-4mm and reactive, normal mucous membranes moving all 4 extremities. No vomiting. Per sister she believes he hid away labetalol for this purpose as he was told not to take that and amlodipine anymore. ECG:  rate 79, NSR, , , QTc 440    PAST MEDICAL & SURGICAL HISTORY:  Smoker      DM (diabetes mellitus)      HTN (hypertension)      Abscess of finger      Bipolar disorder      Chronic hyponatremia      CVID (common variable immunodeficiency)      Hyponatremia      Smoker      Deviated septum      Loss of teeth due to extraction  All teeth due to dental carries          MEDICATION HISTORY:  charcoal/sorbitol Suspension 50 Gram(s) Oral Once      FAMILY HISTORY:  Family history of throat cancer (Father)    Family history of leukemia (Mother)        PHYSICAL EXAM  Vital Signs Last 24 Hrs  T(C): 36.7 (02 Sep 2024 21:09), Max: 36.7 (02 Sep 2024 21:09)  T(F): 98 (02 Sep 2024 21:09), Max: 98 (02 Sep 2024 21:09)  HR: 72 (02 Sep 2024 21:09) (72 - 75)  BP: 110/73 (02 Sep 2024 21:09) (110/73 - 150/91)  BP(mean): --  RR: 19 (02 Sep 2024 21:09) (16 - 19)  SpO2: 96% (02 Sep 2024 21:09) (96% - 99%)    Parameters below as of 02 Sep 2024 21:09  Patient On (Oxygen Delivery Method): room air        SIGNIFICANT LABORATORY STUDIES:                          RADIOLOGIC STUDIES   MEDICAL TOXICOLOGY CONSULT    HPI:    This is a 58 year old male presenting for labetalol overdose. Per ED, around 7pm patient reports taking 20-40 200mg labetalol with SI. He is also prescribed escitalopram, amlodipine, levothyroxine, benztropine and one other unclear medication. He denies taking any other pills. Per sister he is more sleepy than usual. His vitals are normal with HR 72, /73. On exam he is reported as fatigued appearing but arouses to voice, normal level of orientation, pupils 3-4mm and reactive, normal mucous membranes moving all 4 extremities. No vomiting. Per sister she believes he hid away labetalol for this purpose as he was told not to take that and amlodipine anymore. No vomiting.     ECG:  rate 79, NSR, , , QTc 440    PAST MEDICAL & SURGICAL HISTORY:  Smoker      DM (diabetes mellitus)      HTN (hypertension)      Abscess of finger      Bipolar disorder      Chronic hyponatremia      CVID (common variable immunodeficiency)      Hyponatremia      Smoker      Deviated septum      Loss of teeth due to extraction  All teeth due to dental carries          MEDICATION HISTORY:  charcoal/sorbitol Suspension 50 Gram(s) Oral Once      FAMILY HISTORY:  Family history of throat cancer (Father)    Family history of leukemia (Mother)        PHYSICAL EXAM  Vital Signs Last 24 Hrs  T(C): 36.7 (02 Sep 2024 21:09), Max: 36.7 (02 Sep 2024 21:09)  T(F): 98 (02 Sep 2024 21:09), Max: 98 (02 Sep 2024 21:09)  HR: 72 (02 Sep 2024 21:09) (72 - 75)  BP: 110/73 (02 Sep 2024 21:09) (110/73 - 150/91)  BP(mean): --  RR: 19 (02 Sep 2024 21:09) (16 - 19)  SpO2: 96% (02 Sep 2024 21:09) (96% - 99%)    Parameters below as of 02 Sep 2024 21:09  Patient On (Oxygen Delivery Method): room air        SIGNIFICANT LABORATORY STUDIES:                          RADIOLOGIC STUDIES

## 2024-09-02 NOTE — ED ADULT NURSE NOTE - OBJECTIVE STATEMENT
Pt received in room 28, aaox3, ambulatory at baseline, breathing even and unlabored in bed. Pt came in s/p suicide attempt by overdose. Pt states he took 20 of his labetalol pills because he wanted to die. Pt states he has felt like this for a while and took the pills because he was thinking abut "things he's done in the past" and he wanted to die. Pt denies HI, visual or auditory hallucinations. Pt denies chest pain, SOB, dizziness, headache, blurry vision, chills. Pt reporting he "feels weird and tired". Bed in lowest position, call bell within reach. Pt on tele monitor and continuous pulse ox, PCA at bedside with pt. #20G IV placed in the left AC, labs drawn and sent.

## 2024-09-03 DIAGNOSIS — T50.901A POISONING BY UNSPECIFIED DRUGS, MEDICAMENTS AND BIOLOGICAL SUBSTANCES, ACCIDENTAL (UNINTENTIONAL), INITIAL ENCOUNTER: ICD-10-CM

## 2024-09-03 DIAGNOSIS — D83.9 COMMON VARIABLE IMMUNODEFICIENCY, UNSPECIFIED: ICD-10-CM

## 2024-09-03 DIAGNOSIS — N40.0 BENIGN PROSTATIC HYPERPLASIA WITHOUT LOWER URINARY TRACT SYMPTOMS: ICD-10-CM

## 2024-09-03 DIAGNOSIS — E78.5 HYPERLIPIDEMIA, UNSPECIFIED: ICD-10-CM

## 2024-09-03 DIAGNOSIS — E03.9 HYPOTHYROIDISM, UNSPECIFIED: ICD-10-CM

## 2024-09-03 DIAGNOSIS — I10 ESSENTIAL (PRIMARY) HYPERTENSION: ICD-10-CM

## 2024-09-03 DIAGNOSIS — E11.9 TYPE 2 DIABETES MELLITUS WITHOUT COMPLICATIONS: ICD-10-CM

## 2024-09-03 LAB
APPEARANCE UR: CLEAR — SIGNIFICANT CHANGE UP
BACTERIA # UR AUTO: NEGATIVE /HPF — SIGNIFICANT CHANGE UP
BILIRUB UR-MCNC: NEGATIVE — SIGNIFICANT CHANGE UP
CAST: 1 /LPF — SIGNIFICANT CHANGE UP (ref 0–4)
COLOR SPEC: YELLOW — SIGNIFICANT CHANGE UP
DIFF PNL FLD: NEGATIVE — SIGNIFICANT CHANGE UP
GLUCOSE UR QL: NEGATIVE MG/DL — SIGNIFICANT CHANGE UP
KETONES UR-MCNC: NEGATIVE MG/DL — SIGNIFICANT CHANGE UP
LEUKOCYTE ESTERASE UR-ACNC: NEGATIVE — SIGNIFICANT CHANGE UP
NITRITE UR-MCNC: NEGATIVE — SIGNIFICANT CHANGE UP
PH UR: 6.5 — SIGNIFICANT CHANGE UP (ref 5–8)
PROT UR-MCNC: 30 MG/DL
RBC CASTS # UR COMP ASSIST: 0 /HPF — SIGNIFICANT CHANGE UP (ref 0–4)
SP GR SPEC: 1.02 — SIGNIFICANT CHANGE UP (ref 1–1.03)
SQUAMOUS # UR AUTO: 0 /HPF — SIGNIFICANT CHANGE UP (ref 0–5)
UROBILINOGEN FLD QL: 0.2 MG/DL — SIGNIFICANT CHANGE UP (ref 0.2–1)
WBC UR QL: 1 /HPF — SIGNIFICANT CHANGE UP (ref 0–5)

## 2024-09-03 PROCEDURE — 99451 NTRPROF PH1/NTRNET/EHR 5/>: CPT

## 2024-09-03 PROCEDURE — 99223 1ST HOSP IP/OBS HIGH 75: CPT

## 2024-09-03 PROCEDURE — 90792 PSYCH DIAG EVAL W/MED SRVCS: CPT

## 2024-09-03 PROCEDURE — 93010 ELECTROCARDIOGRAM REPORT: CPT

## 2024-09-03 RX ORDER — BENZTROPINE MESYLATE 2 MG/1
1 TABLET ORAL AT BEDTIME
Refills: 0 | Status: DISCONTINUED | OUTPATIENT
Start: 2024-09-03 | End: 2024-09-04

## 2024-09-03 RX ORDER — TAMSULOSIN HYDROCHLORIDE 0.4 MG/1
0.4 CAPSULE ORAL AT BEDTIME
Refills: 0 | Status: DISCONTINUED | OUTPATIENT
Start: 2024-09-03 | End: 2024-09-03

## 2024-09-03 RX ORDER — ACETAMINOPHEN 325 MG/1
650 TABLET ORAL EVERY 6 HOURS
Refills: 0 | Status: DISCONTINUED | OUTPATIENT
Start: 2024-09-03 | End: 2024-09-11

## 2024-09-03 RX ORDER — ESCITALOPRAM OXALATE 10 MG/1
10 TABLET ORAL DAILY
Refills: 0 | Status: DISCONTINUED | OUTPATIENT
Start: 2024-09-03 | End: 2024-09-11

## 2024-09-03 RX ORDER — HALOPERIDOL 1 MG
5 TABLET ORAL AT BEDTIME
Refills: 0 | Status: DISCONTINUED | OUTPATIENT
Start: 2024-09-03 | End: 2024-09-11

## 2024-09-03 RX ORDER — DEXTROSE 15 G/33 G
15 GEL IN PACKET (GRAM) ORAL ONCE
Refills: 0 | Status: DISCONTINUED | OUTPATIENT
Start: 2024-09-03 | End: 2024-09-11

## 2024-09-03 RX ORDER — DEXTROSE 15 G/33 G
25 GEL IN PACKET (GRAM) ORAL ONCE
Refills: 0 | Status: DISCONTINUED | OUTPATIENT
Start: 2024-09-03 | End: 2024-09-11

## 2024-09-03 RX ORDER — ENOXAPARIN SODIUM 100 MG/ML
40 INJECTION SUBCUTANEOUS EVERY 24 HOURS
Refills: 0 | Status: DISCONTINUED | OUTPATIENT
Start: 2024-09-03 | End: 2024-09-11

## 2024-09-03 RX ORDER — LEVOTHYROXINE SODIUM 100 MCG
50 TABLET ORAL DAILY
Refills: 0 | Status: DISCONTINUED | OUTPATIENT
Start: 2024-09-03 | End: 2024-09-11

## 2024-09-03 RX ORDER — DEXTROSE 15 G/33 G
12.5 GEL IN PACKET (GRAM) ORAL ONCE
Refills: 0 | Status: DISCONTINUED | OUTPATIENT
Start: 2024-09-03 | End: 2024-09-11

## 2024-09-03 RX ORDER — ACETAMINOPHEN 325 MG/1
650 TABLET ORAL ONCE
Refills: 0 | Status: COMPLETED | OUTPATIENT
Start: 2024-09-03 | End: 2024-09-03

## 2024-09-03 RX ORDER — GLUCAGON INJECTION, SOLUTION 1 MG/.2ML
1 INJECTION, SOLUTION SUBCUTANEOUS ONCE
Refills: 0 | Status: DISCONTINUED | OUTPATIENT
Start: 2024-09-03 | End: 2024-09-11

## 2024-09-03 RX ADMIN — ACETAMINOPHEN 650 MILLIGRAM(S): 325 TABLET ORAL at 21:17

## 2024-09-03 RX ADMIN — Medication 40 MILLIGRAM(S): at 22:32

## 2024-09-03 RX ADMIN — BENZTROPINE MESYLATE 1 MILLIGRAM(S): 2 TABLET ORAL at 22:51

## 2024-09-03 RX ADMIN — ACETAMINOPHEN 650 MILLIGRAM(S): 325 TABLET ORAL at 22:17

## 2024-09-03 RX ADMIN — Medication 5 MILLIGRAM(S): at 22:32

## 2024-09-03 NOTE — ED ADULT NURSE REASSESSMENT NOTE - NS ED NURSE REASSESS COMMENT FT1
8:30am: Received report from PM RN. Patient is alert and oriented times four. Patient has 1:1 observation in progress. Patient is calm and cooperative.  Breakfast offered and accepted. Denies suicidal ideation at this moment. Will continue to monitor.  GARCIA Lemon
PT is resting in stretcher, easily arousable to verbal stimuli. no apparent distress noted at this time. hand off will be given to primary nurse when return to area.
martha owen-sister took all belonging home
Break RN Note- Patient resting quietly in bed, breathing even and nonlabored. No acute distress. Cardiac monitor in place- sinus rhythm. Zoll in place. 1:1 at bedside. Safety maintained. Patient stable upon exiting the room.
Pt at baseline mental status, breathing even and unlabored in bed. Pt denies chest pain, SOB, dizziness, headache, blurry vision, chills. Bed in lowest position, call bell within reach.
Pt given calcium gluconate and glucagon IV with MD Bond at bedside. Pt on tele monitor and Zoll. Pt denies chest pain, SOB, dizziness, headache, blurry vision, chills, N/V. Bed in lowest position, call bell within reach.

## 2024-09-03 NOTE — H&P ADULT - NSHPLABSRESULTS_GEN_ALL_CORE
LABS:                         11.5   9.58  )-----------( 176      ( 02 Sep 2024 20:45 )             33.6     09-    133<L>  |  99  |  12  ----------------------------<  137<H>  4.1   |  23  |  1.03    Ca    9.5      02 Sep 2024 20:45    TPro  7.4  /  Alb  4.3  /  TBili  <0.2  /  DBili  x   /  AST  16  /  ALT  14  /  AlkPhos  115  09-02    Urinalysis Basic - ( 03 Sep 2024 07:36 )    Color: Yellow / Appearance: Clear / S.016 / pH: x  Gluc: x / Ketone: Negative mg/dL  / Bili: Negative / Urobili: 0.2 mg/dL   Blood: x / Protein: 30 mg/dL / Nitrite: Negative   Leuk Esterase: Negative / RBC: 0 /HPF / WBC 1 /HPF   Sq Epi: x / Non Sq Epi: 0 /HPF / Bacteria: Negative /HPF    RADIOLOGY & ADDITIONAL TESTS: Reviewed.

## 2024-09-03 NOTE — H&P ADULT - TIME BILLING
Time spent on review of vitals, physical exam, documentation, and discussion of plan of care with patient

## 2024-09-03 NOTE — H&P ADULT - HISTORY OF PRESENT ILLNESS
57 y/o M with PMHx of schizoaffective disorder (bipolar type, w/ multiple [facility] admissions), HTN, HLD, hypothyroidism, CVID/ hypogammaglobulinemia (on monthly IVIG, last on 7/23, follows with Dr. Mitchell Boxer) COPD not on home O2, hx of frequent skin infection (MRSA abscess / cellulitis w/ MRSA bacteremia s/p debridement 6/2023) who presented after overdosing on labetalol. Patient states he was at the store earlier today when he heard kids yelling and "had paranoid delusions" about the police coming after him. He went home and ingested 20 labetalol pills with an intent to harm himself. He states he took his other home medications as prescribed and denies taking extra doses. He denies taking any other substances. He reported SOB, now resolved, but otherwise denied any other symptoms. He states he is scared that people are listening to him.     Of note, patient was last hospitalized in 7/2024 after a suicide attempt with labetalol, requiring pressors and insulin gtt with improvement. His hospital course was complicated by cellulitis of the L leg s/p a course of abx with continued wound care, TASH due to ATN vs AIN, now improved. He was transferred to [facility] on 7/22 for IVIG infusions, then transferred back to [facility] for psychiatric treatment. He was discharged home on 8/28.    ED Course:  Afebrile, /91, HR 75, RR 16, 99% on RA  Toxicology consulted and recommended charcoal, however patient was somnolent and became more hypotensive  Given glucagon, Ca gluconate, NS  MICU consulted and pt deemed not an ICU candidate  Admitted to Medicine for further management

## 2024-09-03 NOTE — H&P ADULT - PROBLEM SELECTOR PLAN 1
-Patient presented to the hospital after intentional ingestion of 20 labetalol pills in setting of paranoid delusions. Hx of prior suicide attempts with the same, previously requiring ICU level care.  -Tox screen negative. Vitals initially stable, however became more hypotensive and somnolent. He was given Ca gluconate, NS and glucagon in the ED. Evaluated by MICU team and was hemodynamically stable, deemed not an ICU candidate  -Holding psych medications (haldol, cogentin, lexapro) pending Psych recommendations  -Trend BP, HR, and monitor for mental status changes  -Continue 1:1 and safety precautions  -Monitor on Telemetry

## 2024-09-03 NOTE — H&P ADULT - ASSESSMENT
57 y/o M with PMHx of schizoaffective disorder (bipolar type, w/ multiple [facility] admissions), HTN, HLD, hypothyroidism, CVID/ hypogammaglobulinemia (on monthly IVIG, last on 7/23, follows with Dr. Mitchell Boxer) COPD not on home O2, hx of frequent skin infection (MRSA abscess / cellulitis w/ MRSA bacteremia s/p debridement 6/2023) who presented after labetalol overdose.

## 2024-09-03 NOTE — BH CONSULTATION LIAISON ASSESSMENT NOTE - CURRENT MEDICATION
MEDICATIONS  (STANDING):  atorvastatin 40 milliGRAM(s) Oral at bedtime  dextrose 5%. 1000 milliLiter(s) (50 mL/Hr) IV Continuous <Continuous>  dextrose 5%. 1000 milliLiter(s) (100 mL/Hr) IV Continuous <Continuous>  dextrose 50% Injectable 25 Gram(s) IV Push once  dextrose 50% Injectable 12.5 Gram(s) IV Push once  dextrose 50% Injectable 25 Gram(s) IV Push once  enoxaparin Injectable 40 milliGRAM(s) SubCutaneous every 24 hours  glucagon  Injectable 1 milliGRAM(s) IntraMuscular once  levothyroxine 50 MICROGram(s) Oral daily    MEDICATIONS  (PRN):  acetaminophen     Tablet .. 650 milliGRAM(s) Oral every 6 hours PRN Temp greater or equal to 38C (100.4F), Mild Pain (1 - 3)  dextrose Oral Gel 15 Gram(s) Oral once PRN Blood Glucose LESS THAN 70 milliGRAM(s)/deciliter  melatonin 3 milliGRAM(s) Oral at bedtime PRN Insomnia

## 2024-09-03 NOTE — BH CONSULTATION LIAISON ASSESSMENT NOTE - NSBHATTESTCOMMENTATTENDFT_PSY_A_CORE
58 year old M PPH of schizoaffective disorder PMH of HTN, HLD, hypothyroidism, CVID/hypogammaglobulinemia (monthly IVIG, last on 7/23), hx of frequent skin infection (MRSA, abscess/cellulitis w MRSA bacteremia s/p debridement 6/2023) adm for SI, possible OD. Patient with SI due to voices and paranoia of people watching him and coming to get him. Sleeping/eating fine. States he is taking his meds, s/p BELLA at [facility] on 8/28, no EPS on exam. Sisters concerned for relapse into psych decompensation given psychosis and SI statements, patient states he OD's but sister are not sure that he actually did. Patient feels unsafe at home. Will require inpt psychiatric hospitalization after medical clearance.

## 2024-09-03 NOTE — BH CONSULTATION LIAISON ASSESSMENT NOTE - CURRENT PLAN:
Render Note In Bullet Format When Appropriate: No
Duration Of Freeze Thaw-Cycle (Seconds): 7
Post-Care Instructions: I reviewed with the patient in detail post-care instructions. Patient is to wear sunprotection, and avoid picking at any of the treated lesions. Pt may apply Vaseline to crusted or scabbing areas.
Show Applicator Variable?: Yes
Number Of Freeze-Thaw Cycles: 2 freeze-thaw cycles
Detail Level: Simple
Consent: The patient's consent was obtained including but not limited to risks of crusting, scabbing, blistering, scarring, darker or lighter pigmentary change, recurrence, incomplete removal and infection.
Yes

## 2024-09-03 NOTE — BH CONSULTATION LIAISON ASSESSMENT NOTE - NSBHCHARTREVIEWVS_PSY_A_CORE FT
Vital Signs Last 24 Hrs  T(C): 36.4 (03 Sep 2024 15:23), Max: 36.8 (02 Sep 2024 23:06)  T(F): 97.6 (03 Sep 2024 15:23), Max: 98.3 (02 Sep 2024 23:06)  HR: 72 (03 Sep 2024 15:23) (57 - 75)  BP: 148/64 (03 Sep 2024 15:23) (98/62 - 154/66)  BP(mean): 80 (03 Sep 2024 00:56) (75 - 80)  RR: 18 (03 Sep 2024 15:23) (16 - 19)  SpO2: 100% (03 Sep 2024 15:23) (96% - 100%)    Parameters below as of 03 Sep 2024 15:23  Patient On (Oxygen Delivery Method): room air

## 2024-09-03 NOTE — BH CONSULTATION LIAISON ASSESSMENT NOTE - SUMMARY
57 yo male w PMHx of schizoaffective disorder (bipolar type with multiple [facility] admissions), HTN, HLD, hypothyroidism, CVID/hypogammaglobulinemia (monthly IVIG, last on 7/23), hx of frequent skin infection (MRSA, abscess/cellulitis w MRSA bacteremia s/p debridement 6/2023) presented to the ED with sisters after appearing unwell. Interview was attempted, pt admits to feeling nervous that "other people are talking about him" which lead to him having suicidal thoughts. Pt states that these symptoms worsened about 1 year ago. Following this, pt reported feeling too tired to continue. Collateral was obtained from sisters. Sister reports that pt was discharged from [facility] 8/28/24 with plans for home health service and PACE program, however, the home health service was not able to be scheduled until the following week. Sisters called multiple times per day to check in with pt and state that for 5 days, pt was well. However, yesterday (9/2), pt reported feeling "unwell" after noticing "boils" under his arm and decided to go for a walk where he encountered kids yelling and began to have paranoid thoughts. Upon daily phone call to check in, family described him as "foggy" and decided to take him to the ER where he admitted to overdosing on his labetalol. Family checked home meds and, "did not find many missing."       Plan:  Haldol (please obtain ECG to ensure QTc<500 ms)  Continue 1:1 and safety precautions 57 yo male w PMHx of schizoaffective disorder (bipolar type with multiple [facility] admissions), HTN, HLD, hypothyroidism, CVID/hypogammaglobulinemia (monthly IVIG, last on 7/23), hx of frequent skin infection (MRSA, abscess/cellulitis w MRSA bacteremia s/p debridement 6/2023) presented to the ED with sisters after appearing unwell admitted for concern for suicide attempt. Psych c/s for SI.     Patient exhibits psychosis and depression, marked features of schizoaffective d/o actively present. Patient currently suicidal and unable to safety plan, will require further optimization of psychiatric medications and psychiatric hospitalization for safety/stabilization.     Plan:  Continue 1:1 and safety precautions for suicidality  - Begin Haldol 5mg qHS PO  - C/w Cogentin 1mg qhs PO  - C/w Lexapro 10mg daily PO  - EKG for QTc, please obtain ECG to ensure QTc<500 ms)  - PRN for agitation Haldol 5mg + Ativan 2mg q6h PRN  PO/IM/IV  - Dispo: inpt psych, [facility] via [facility]

## 2024-09-03 NOTE — H&P ADULT - NSHPPHYSICALEXAM_GEN_ALL_CORE
GENERAL: No acute distress  HEAD:  Normocephalic, Atraumatic  ENT: Edentulous  EYES: EOMI, conjunctiva and sclera clear  NECK: Supple, no JVD  CHEST/LUNG: CTAB, No wheezes, rales, or rhonchi  HEART: Regular rate and rhythm; No murmurs, rubs, or gallops  ABDOMEN: Soft, non-tender, non-distended  EXTREMITIES:  2+ peripheral pulses b/l  NEUROLOGY: A&O x 3, no focal deficits

## 2024-09-03 NOTE — SBIRT NOTE ADULT - NSSBIRTALCPOSREINDET_GEN_A_CORE
Patient reports no use; positive reinforcement provided.   Patient reports no use; positive reinforcement provided. Patient's substance use will be addressed during their inpatient admission.

## 2024-09-03 NOTE — PATIENT PROFILE ADULT - FALL HARM RISK - HARM RISK INTERVENTIONS
Assistance with ambulation/Assistance OOB with selected safe patient handling equipment/Communicate Risk of Fall with Harm to all staff/Monitor for mental status changes/Monitor gait and stability/Provide patient with walking aids - walker, cane, crutches/Reinforce activity limits and safety measures with patient and family/Reorient to person, place and time as needed/Review medications for side effects contributing to fall risk/Sit up slowly, dangle for a short time, stand at bedside before walking/Tailored Fall Risk Interventions/Toileting schedule using arm’s reach rule for commode and bathroom/Use of alarms - bed, chair and/or voice tab/Visual Cue: Yellow wristband and red socks/Bed in lowest position, wheels locked, appropriate side rails in place/Call bell, personal items and telephone in reach/Instruct patient to call for assistance before getting out of bed or chair/Non-slip footwear when patient is out of bed/[place] to call system/Physically safe environment - no spills, clutter or unnecessary equipment/Purposeful Proactive Rounding/Room/bathroom lighting operational, light cord in reach

## 2024-09-03 NOTE — BH CONSULTATION LIAISON ASSESSMENT NOTE - HPI (INCLUDE ILLNESS QUALITY, SEVERITY, DURATION, TIMING, CONTEXT, MODIFYING FACTORS, ASSOCIATED SIGNS AND SYMPTOMS)
57 yo male w PMHx of schizoaffective disorder (bipolar type with multiple [facility] admissions), HTN, HLD, hypothyroidism, CVID/hypogammaglobulinemia (monthly IVIG, last on 7/23), hx of frequent skin infection (MRSA, abscess/cellulitis w MRSA bacteremia s/p debridement 6/2023) presented to the ED with sisters after appearing unwell. Interview was attempted, pt admits to feeling nervous that "other people are talking about him" which lead to him having suicidal thoughts. Pt states that these symptoms worsened about 1 year ago. Following this, pt reported feeling too tired to continue. Collateral was obtained from sisters. Sister reports that pt was discharged from [facility] 8/28/24 with plans for home health service and PACE program, however, the home health service was not able to be scheduled until the following week. Sisters called multiple times per day to check in with pt and state that for 5 days, pt was well. However, yesterday (9/2), pt reported feeling "unwell" after noticing "boils" under his arm and decided to go for a walk where he encountered kids yelling and began to have paranoid thoughts. Upon daily phone call to check in, family described him as "foggy" and decided to take him to the ER where he admitted to overdosing on his labetalol. Family checked home meds and, "did not find many missing."       Of note, sister states pt has a long history of hospitalizations beginning as a teenager secondary to CVID and requiring infusions/IV antibiotic treatment and that his, "quality of life has worsened with age." Pt was hospitalized recently in Dec 2023 at [facility] for bacteremia (?) where he was also switched from haldol to Abilify. Sister describes "odd behavior" including paranoid thoughts while pt was prescribed Abilify. In July of 2024 pt was transferred to [facility] where he could continue to receive his IVIG and was switched back to haldol from Abilify with marked improvement in mental state noted by family.       59 yo male w PMHx of schizoaffective disorder (bipolar type with multiple [facility] admissions), HTN, HLD, hypothyroidism, CVID/hypogammaglobulinemia (monthly IVIG, last on 7/23), hx of frequent skin infection (MRSA, abscess/cellulitis w MRSA bacteremia s/p debridement 6/2023) presented to the ED with sisters after appearing unwell admitted for concern for suicide attempt. Psych c/s for SI.     Interview was attempted, pt admits to feeling nervous that "other people are talking about him" which lead to him having suicidal thoughts. Pt states that these symptoms worsened about 1 year ago. Following this, pt reported feeling too tired to continue.     When seen on later exam - patient admits to SI because of AH with SI content, some paranoia that people are trying to harm him. Depressed, anhedonic, but eating/sleeping well.     Collateral was obtained from sisters: Sister reports that pt was discharged from [facility] 8/28/24 with plans for home health service and PACE program, however, the home health service was not able to be scheduled until the following week. Sisters called multiple times per day to check in with pt and state that for 5 days, pt was well. However, yesterday (9/2), pt reported feeling "unwell" after noticing "boils" under his arm and decided to go for a walk where he encountered kids yelling and began to have paranoid thoughts. Upon daily phone call to check in, family described him as "foggy" and decided to take him to the ER where he admitted to overdosing on his labetalol. Family checked home meds and, "did not find many missing."       Of note, sister states pt has a long history of hospitalizations beginning as a teenager secondary to CVID and requiring infusions/IV antibiotic treatment and that his, "quality of life has worsened with age." Pt was hospitalized recently in Dec 2023 at [facility] for bacteremia (?) where he was also switched from haldol to Abilify. Sister describes "odd behavior" including paranoid thoughts while pt was prescribed Abilify. In July of 2024 pt was transferred to [facility] where he could continue to receive his IVIG and was switched back to haldol from Abilify with marked improvement in mental state noted by family.

## 2024-09-04 LAB
ANION GAP SERPL CALC-SCNC: 13 MMOL/L — SIGNIFICANT CHANGE UP (ref 7–14)
BASOPHILS # BLD AUTO: 0.02 K/UL — SIGNIFICANT CHANGE UP (ref 0–0.2)
BASOPHILS NFR BLD AUTO: 0.3 % — SIGNIFICANT CHANGE UP (ref 0–2)
BUN SERPL-MCNC: 7 MG/DL — SIGNIFICANT CHANGE UP (ref 7–23)
CALCIUM SERPL-MCNC: 9.2 MG/DL — SIGNIFICANT CHANGE UP (ref 8.4–10.5)
CHLORIDE SERPL-SCNC: 100 MMOL/L — SIGNIFICANT CHANGE UP (ref 98–107)
CO2 SERPL-SCNC: 23 MMOL/L — SIGNIFICANT CHANGE UP (ref 22–31)
CREAT SERPL-MCNC: 0.96 MG/DL — SIGNIFICANT CHANGE UP (ref 0.5–1.3)
EGFR: 93 ML/MIN/1.73M2 — SIGNIFICANT CHANGE UP
EOSINOPHIL # BLD AUTO: 0.14 K/UL — SIGNIFICANT CHANGE UP (ref 0–0.5)
EOSINOPHIL NFR BLD AUTO: 2.1 % — SIGNIFICANT CHANGE UP (ref 0–6)
GLUCOSE SERPL-MCNC: 97 MG/DL — SIGNIFICANT CHANGE UP (ref 70–99)
HCT VFR BLD CALC: 33.6 % — LOW (ref 39–50)
HGB BLD-MCNC: 11.2 G/DL — LOW (ref 13–17)
IANC: 5.12 K/UL — SIGNIFICANT CHANGE UP (ref 1.8–7.4)
IMM GRANULOCYTES NFR BLD AUTO: 0.3 % — SIGNIFICANT CHANGE UP (ref 0–0.9)
LYMPHOCYTES # BLD AUTO: 0.75 K/UL — LOW (ref 1–3.3)
LYMPHOCYTES # BLD AUTO: 11.3 % — LOW (ref 13–44)
MAGNESIUM SERPL-MCNC: 2.1 MG/DL — SIGNIFICANT CHANGE UP (ref 1.6–2.6)
MCHC RBC-ENTMCNC: 27.9 PG — SIGNIFICANT CHANGE UP (ref 27–34)
MCHC RBC-ENTMCNC: 33.3 GM/DL — SIGNIFICANT CHANGE UP (ref 32–36)
MCV RBC AUTO: 83.8 FL — SIGNIFICANT CHANGE UP (ref 80–100)
MONOCYTES # BLD AUTO: 0.61 K/UL — SIGNIFICANT CHANGE UP (ref 0–0.9)
MONOCYTES NFR BLD AUTO: 9.2 % — SIGNIFICANT CHANGE UP (ref 2–14)
MRSA PCR RESULT.: DETECTED
NEUTROPHILS # BLD AUTO: 5.12 K/UL — SIGNIFICANT CHANGE UP (ref 1.8–7.4)
NEUTROPHILS NFR BLD AUTO: 76.8 % — SIGNIFICANT CHANGE UP (ref 43–77)
NRBC # BLD: 0 /100 WBCS — SIGNIFICANT CHANGE UP (ref 0–0)
NRBC # FLD: 0 K/UL — SIGNIFICANT CHANGE UP (ref 0–0)
PHOSPHATE SERPL-MCNC: 3.4 MG/DL — SIGNIFICANT CHANGE UP (ref 2.5–4.5)
PLATELET # BLD AUTO: 164 K/UL — SIGNIFICANT CHANGE UP (ref 150–400)
POTASSIUM SERPL-MCNC: 3.7 MMOL/L — SIGNIFICANT CHANGE UP (ref 3.5–5.3)
POTASSIUM SERPL-SCNC: 3.7 MMOL/L — SIGNIFICANT CHANGE UP (ref 3.5–5.3)
RBC # BLD: 4.01 M/UL — LOW (ref 4.2–5.8)
RBC # FLD: 15.7 % — HIGH (ref 10.3–14.5)
S AUREUS DNA NOSE QL NAA+PROBE: DETECTED
SODIUM SERPL-SCNC: 136 MMOL/L — SIGNIFICANT CHANGE UP (ref 135–145)
WBC # BLD: 6.66 K/UL — SIGNIFICANT CHANGE UP (ref 3.8–10.5)
WBC # FLD AUTO: 6.66 K/UL — SIGNIFICANT CHANGE UP (ref 3.8–10.5)

## 2024-09-04 PROCEDURE — 88312 SPECIAL STAINS GROUP 1: CPT | Mod: 26

## 2024-09-04 PROCEDURE — 99232 SBSQ HOSP IP/OBS MODERATE 35: CPT

## 2024-09-04 PROCEDURE — 99223 1ST HOSP IP/OBS HIGH 75: CPT | Mod: 25

## 2024-09-04 PROCEDURE — 11104 PUNCH BX SKIN SINGLE LESION: CPT

## 2024-09-04 PROCEDURE — 11105 PUNCH BX SKIN EA SEP/ADDL: CPT

## 2024-09-04 PROCEDURE — 88305 TISSUE EXAM BY PATHOLOGIST: CPT | Mod: 26

## 2024-09-04 RX ORDER — CHLORHEXIDINE GLUCONATE 40 MG/ML
1 SOLUTION TOPICAL DAILY
Refills: 0 | Status: DISCONTINUED | OUTPATIENT
Start: 2024-09-04 | End: 2024-09-11

## 2024-09-04 RX ORDER — BENZTROPINE MESYLATE 2 MG/1
1 TABLET ORAL
Refills: 0 | Status: DISCONTINUED | OUTPATIENT
Start: 2024-09-04 | End: 2024-09-11

## 2024-09-04 RX ORDER — MUPIROCIN 2 %
1 OINTMENT (GRAM) TOPICAL
Refills: 0 | Status: DISCONTINUED | OUTPATIENT
Start: 2024-09-04 | End: 2024-09-11

## 2024-09-04 RX ORDER — LORAZEPAM 4 MG/ML
0.5 INJECTION INTRAMUSCULAR; INTRAVENOUS ONCE
Refills: 0 | Status: DISCONTINUED | OUTPATIENT
Start: 2024-09-04 | End: 2024-09-04

## 2024-09-04 RX ADMIN — Medication 40 MILLIGRAM(S): at 21:11

## 2024-09-04 RX ADMIN — Medication 1 APPLICATION(S): at 18:39

## 2024-09-04 RX ADMIN — ESCITALOPRAM OXALATE 10 MILLIGRAM(S): 10 TABLET ORAL at 11:35

## 2024-09-04 RX ADMIN — ACETAMINOPHEN 650 MILLIGRAM(S): 325 TABLET ORAL at 10:39

## 2024-09-04 RX ADMIN — Medication 5 MILLIGRAM(S): at 21:11

## 2024-09-04 RX ADMIN — ENOXAPARIN SODIUM 40 MILLIGRAM(S): 100 INJECTION SUBCUTANEOUS at 06:41

## 2024-09-04 RX ADMIN — LORAZEPAM 0.5 MILLIGRAM(S): 4 INJECTION INTRAMUSCULAR; INTRAVENOUS at 18:13

## 2024-09-04 RX ADMIN — Medication 50 MICROGRAM(S): at 05:24

## 2024-09-04 NOTE — ADVANCED PRACTICE NURSE CONSULT - REASON FOR CONSULT
Patient seen on skin care rounds after wound care referral received for assessment of skin impairment and recommendations of topical management of the LLE chronic wound with h/o frequent cellulitis. As per h&P, patient is a 57 y/o M with PMHx of schizoaffective disorder (bipolar type, w/ multiple [facility] admissions), HTN, HLD, hypothyroidism, CVID/ hypogammaglobulinemia (on monthly IVIG, last on 7/23, follows with Dr. Mitchell Boxer) COPD not on home O2, hx of frequent skin infection (MRSA abscess / cellulitis w/ MRSA bacteremia s/p debridement 6/2023) who presented after overdosing on labetalol. Patient states he was at the store earlier today when he heard kids yelling and "had paranoid delusions" about the police coming after him. He went home and ingested 20 labetalol pills with an intent to harm himself. He states he took his other home medications as prescribed and denies taking extra doses. He denies taking any other substances. He reported SOB, now resolved, but otherwise denied any other symptoms. He states he is scared that people are listening to him. Of note, patient was last hospitalized in 7/2024 after a suicide attempt with labetalol, requiring pressors and insulin gtt with improvement. His hospital course was complicated by cellulitis of the L leg s/p a course of abx with continued wound care, TASH due to ATN vs AIN, now improved. He was transferred to [facility] on 7/22 for IVIG infusions, then transferred back to [facility] for psychiatric treatment. He was discharged home on 8/28.    Chart reviewed: WBC: 6.66, IANC: 5.12, H&H: 11.2/33.6, Platelets: 164, A1C: 5.8, BMI: 31.6, Cuate 20.

## 2024-09-04 NOTE — ADVANCED PRACTICE NURSE CONSULT - ASSESSMENT
General: A&Ox3, pleasant, able to ambulate independently; 1:1 at bedside arms length away at all times. Patient is continent of urine and stool. Skin warm, dry with increased moisture in intertriginous folds, scattered areas of hyperpigmentation and hypopigmentation, scattered areas of ecchymosis on bilateral upper extremities with no hematomas. Blanchable erythema on bilateral heels.     Vascular: Bilateral lower extremities with scattered areas of hyper and hypopigmentation. Thin, shiny, taught skin. Thin rigid toenails. No temperature changes or edema noted. Capillary refill <3 seconds. Palpable bounding DP/PT pulses, with bilateral biphasic doppler sounds.     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 Wound measuring [id number] with 100% reepithelialization presenting as pale pink epidermis noted, with hyperpigmentation circumferentially extending 1.3cm. Bogginess palpated underneath, no fluctuance noted. No induration, no erythema, no crepitus, no edema, no temperature changes, no overt signs of infection or acute ischemia noted.       Occipital Scalp: Furunculosis vs Malignancy vs MRSA abscess (due to patient's h/o frequent infections) evidenced by punched out lesion with indurated wound edges measuring [id number] with 40% yellow fibrin and 60% pink dermis. As per patient, has also had wound chronically and is unsure of origin or diagnosis. Patient complains of pain and tenderness with cleansing; scant serosanguinous drainage with no odor noted, observed small serosanguinous drainage on pillow case. No induration, no erythema, no edema, no crepitus, no fluctuance, no temperature changes, no overt signs of infection noted.

## 2024-09-04 NOTE — BH CONSULTATION LIAISON PROGRESS NOTE - NSBHCHARTREVIEWVS_PSY_A_CORE FT
Vital Signs Last 24 Hrs  T(C): 36.7 (04 Sep 2024 06:38), Max: 36.7 (03 Sep 2024 10:51)  T(F): 98 (04 Sep 2024 06:38), Max: 98.1 (03 Sep 2024 22:48)  HR: 60 (04 Sep 2024 06:38) (60 - 72)  BP: 134/64 (04 Sep 2024 06:38) (134/64 - 158/71)  BP(mean): --  RR: 18 (04 Sep 2024 06:38) (18 - 18)  SpO2: 98% (04 Sep 2024 06:38) (96% - 100%)    Parameters below as of 04 Sep 2024 06:38  Patient On (Oxygen Delivery Method): room air

## 2024-09-04 NOTE — CONSULT NOTE ADULT - ASSESSMENT
ASSESSMENT/PLAN: Differential diagnosis includes nonmelanoma skin cancer (basal cell vs. squamous cell carcinoma) vs. ruptured pilar cyst vs. abscess (staph or other atypical infection).    - Follow-up 2 biopsies performed today for H&E and tissue culture    Dermatology Punch Biopsy Procedure Note  Punch biopsy diameter: 4mm  Sutures used: dissolvable Gel foam packing    Risks (including bleeding, infection, and scar) and benefits, as well as patient allergies/intolerances were reviewed. Consent was reviewed, signed by provider. Written consent was given by the patient's healthcare proxy (sister; Brittany Arias).   The area was cleaned with alcohol and anesthetized with 1% lidocaine and epinephrine. A sterile punch instrument was used to incise a circular piece of tissue into the superficial subcutaneous plane, and the wound was closed with sutures above. The specimen was sent to pathology. Vaseline and a bandage were applied. The patient tolerated the procedure well with minimal blood loss and no complications. Post-op instructions were given. There were no pre-op or post-op medications. The patient and providers were instructed to keep the biopsy site dry and covered for 24 hours. Wound care was reviewed, including gentle washing the area daily with mild soap and water and applying clean Mupirocin 2% ointment and Bandaid until the site has healed. The team was advised to contact us if the patient develops persistent bleeding, redness, swelling, increasing pain, or purulent/pus drainage from the site.        Thank you for this consult. Patient was seen and reviewed with attending dermatologist Dr. Gilliland    Please page [phone] with a 10-digit call-back number for further related questions.      Malena Landa MD  Resident Physician, PGY-3  [facility]   Pager: [phone]

## 2024-09-04 NOTE — BH CONSULTATION LIAISON PROGRESS NOTE - NSBHFUPINTERVALHXFT_PSY_A_CORE
Pt seen at bedside. Pt reports improved mood, describing it as "excellent" and states he slept well, without any interruptions but is unable to estimate the number of hours. Pt states he has a good appetite and feels safe here. Pt was visited by his two sisters last night, feels that he has a good support system. Pt denies current AVH or SI/HI but admits he previously has "paranoid delusions" which are triggered by "guilty" thoughts and prompted him to harm himself- he admits 2 months ago he took labetalol with the intention to harm himself. He states he is hospitalized because of his "paranoid delusions"

## 2024-09-04 NOTE — PROGRESS NOTE ADULT - ASSESSMENT
57 y/o M with PMHx of schizoaffective disorder (bipolar type, w/ multiple [facility] admissions), HTN, HLD, hypothyroidism, CVID/ hypogammaglobulinemia (on monthly IVIG, last on 7/23, follows with Dr. Mitchell Boxer) COPD not on home O2, hx of frequent skin infection (MRSA abscess / cellulitis w/ MRSA bacteremia s/p debridement 6/2023) who presented after labetalol overdose.

## 2024-09-04 NOTE — ADVANCED PRACTICE NURSE CONSULT - RECOMMEDATIONS
Recommending dermatology consult for atypical wound presentation located on posterior occipital skull furunculosis vs malignancy vs MRSA abscess     Recommending imaging to r/o fluid or gas collection to L lateral LE chronic wound healed with bogginess on palpation    Recommend continuing to follow up care at [facility]: [phone]. Address: [address] [place] [address].     Topical Recommendations    Occipital Scalp: Cleanse with NS, pat dry. Apply Liquid barrier film to periwound skin (allow to dry). Apply Medihoney gel to base of wound, cover with silicone foam with border. Change daily and PRN if soiled.    Continue low air loss bed therapy, continue heel elevation, continue to turn & reposition per protocol, soft pillow between bony prominences, continue moisture management with barrier creams & single breathable pad, continue measures to decrease friction/shear/pressure. Continue with nutritional support as per dietary/orders.    Plan of care discussed with patient, primary RN and primary ACP Beatriz with all questions answered.    Please contact Wound Care Service Line if we can be of further assistance (ext [phone]).

## 2024-09-04 NOTE — BH CONSULTATION LIAISON PROGRESS NOTE - NSBHASSESSMENTFT_PSY_ALL_CORE
9/4: Pt reports improved mood, describing it as "excellent". He denies current AVH/SI/HI but admits that his "paranoid delusions" are triggered easily by "guilty" thoughts. Pt will require further assessment and psychiatric hospitalization for safety/stabilization.     Plans:  Continue 1:1 and safety precautions for suicidality  -Continue Haldol 5mg qHS PO  -C/w cogentin 1 mg qHS PO  -C/w lexapro 10 mg daily PO  -PRN for agitation Haldol 5mg + Ativan mg q6h PRN PO/IM/IV  -Dispo, inpt psych once medically cleared    57 yo male w PMHx of schizoaffective disorder (bipolar type with multiple [facility] admissions), HTN, HLD, hypothyroidism, CVID/hypogammaglobulinemia (monthly IVIG, last on 7/23), hx of frequent skin infection (MRSA, abscess/cellulitis w MRSA bacteremia s/p debridement 6/2023) presented to the ED with sisters after appearing unwell admitted for concern for suicide attempt. Psych c/s for SI.     Initial eval: Patient exhibits psychosis and depression, marked features of schizoaffective d/o actively present. Patient currently suicidal and unable to safety plan, will require further optimization of psychiatric medications and psychiatric hospitalization for safety/stabilization.    9/4: Pt reports improved mood, describing it as "excellent". He denies current AVH/SI/HI but admits that his "paranoid delusions" are triggered easily by "guilty" thoughts. Pt will require further assessment and psychiatric hospitalization for safety/stabilization given concern for SI/suicide attempt, no EPS/sfx noted on haldol.     Plans:  Continue 1:1 and safety precautions for suicidality  -Continue Haldol 5mg qHS PO  -Increase cogentin to 1 mg BIDPO  -C/w lexapro 10 mg daily PO  -PRN for agitation Haldol 5mg + Ativan mg q6h PRN PO/IM/IV  -Dispo, inpt psych once medically cleared

## 2024-09-05 LAB
ANION GAP SERPL CALC-SCNC: 14 MMOL/L — SIGNIFICANT CHANGE UP (ref 7–14)
BASOPHILS # BLD AUTO: 0.03 K/UL — SIGNIFICANT CHANGE UP (ref 0–0.2)
BASOPHILS NFR BLD AUTO: 0.4 % — SIGNIFICANT CHANGE UP (ref 0–2)
BUN SERPL-MCNC: 9 MG/DL — SIGNIFICANT CHANGE UP (ref 7–23)
CALCIUM SERPL-MCNC: 9.2 MG/DL — SIGNIFICANT CHANGE UP (ref 8.4–10.5)
CHLORIDE SERPL-SCNC: 97 MMOL/L — LOW (ref 98–107)
CO2 SERPL-SCNC: 24 MMOL/L — SIGNIFICANT CHANGE UP (ref 22–31)
CREAT SERPL-MCNC: 0.9 MG/DL — SIGNIFICANT CHANGE UP (ref 0.5–1.3)
EGFR: 100 ML/MIN/1.73M2 — SIGNIFICANT CHANGE UP
EOSINOPHIL # BLD AUTO: 0.19 K/UL — SIGNIFICANT CHANGE UP (ref 0–0.5)
EOSINOPHIL NFR BLD AUTO: 2.8 % — SIGNIFICANT CHANGE UP (ref 0–6)
GLUCOSE SERPL-MCNC: 92 MG/DL — SIGNIFICANT CHANGE UP (ref 70–99)
GRAM STN FLD: ABNORMAL
GRAM STN FLD: SIGNIFICANT CHANGE UP
HCT VFR BLD CALC: 33.8 % — LOW (ref 39–50)
HGB BLD-MCNC: 11.4 G/DL — LOW (ref 13–17)
IANC: 4.86 K/UL — SIGNIFICANT CHANGE UP (ref 1.8–7.4)
IMM GRANULOCYTES NFR BLD AUTO: 0.3 % — SIGNIFICANT CHANGE UP (ref 0–0.9)
LYMPHOCYTES # BLD AUTO: 0.99 K/UL — LOW (ref 1–3.3)
LYMPHOCYTES # BLD AUTO: 14.7 % — SIGNIFICANT CHANGE UP (ref 13–44)
MAGNESIUM SERPL-MCNC: 2.1 MG/DL — SIGNIFICANT CHANGE UP (ref 1.6–2.6)
MCHC RBC-ENTMCNC: 28.1 PG — SIGNIFICANT CHANGE UP (ref 27–34)
MCHC RBC-ENTMCNC: 33.7 GM/DL — SIGNIFICANT CHANGE UP (ref 32–36)
MCV RBC AUTO: 83.5 FL — SIGNIFICANT CHANGE UP (ref 80–100)
MONOCYTES # BLD AUTO: 0.66 K/UL — SIGNIFICANT CHANGE UP (ref 0–0.9)
MONOCYTES NFR BLD AUTO: 9.8 % — SIGNIFICANT CHANGE UP (ref 2–14)
NEUTROPHILS # BLD AUTO: 4.86 K/UL — SIGNIFICANT CHANGE UP (ref 1.8–7.4)
NEUTROPHILS NFR BLD AUTO: 72 % — SIGNIFICANT CHANGE UP (ref 43–77)
NIGHT BLUE STAIN TISS: SIGNIFICANT CHANGE UP
NRBC # BLD: 0 /100 WBCS — SIGNIFICANT CHANGE UP (ref 0–0)
NRBC # FLD: 0 K/UL — SIGNIFICANT CHANGE UP (ref 0–0)
PHOSPHATE SERPL-MCNC: 3.3 MG/DL — SIGNIFICANT CHANGE UP (ref 2.5–4.5)
PLATELET # BLD AUTO: 175 K/UL — SIGNIFICANT CHANGE UP (ref 150–400)
POTASSIUM SERPL-MCNC: 4.1 MMOL/L — SIGNIFICANT CHANGE UP (ref 3.5–5.3)
POTASSIUM SERPL-SCNC: 4.1 MMOL/L — SIGNIFICANT CHANGE UP (ref 3.5–5.3)
RBC # BLD: 4.05 M/UL — LOW (ref 4.2–5.8)
RBC # FLD: 15.5 % — HIGH (ref 10.3–14.5)
SODIUM SERPL-SCNC: 135 MMOL/L — SIGNIFICANT CHANGE UP (ref 135–145)
[facility] SOURCE: SIGNIFICANT CHANGE UP
[facility] SOURCE: SIGNIFICANT CHANGE UP
WBC # BLD: 6.75 K/UL — SIGNIFICANT CHANGE UP (ref 3.8–10.5)
WBC # FLD AUTO: 6.75 K/UL — SIGNIFICANT CHANGE UP (ref 3.8–10.5)

## 2024-09-05 PROCEDURE — 99231 SBSQ HOSP IP/OBS SF/LOW 25: CPT | Mod: FS

## 2024-09-05 RX ORDER — ACETAMINOPHEN 325 MG/1
325 TABLET ORAL
Refills: 0 | Status: ACTIVE | OUTPATIENT
Start: 2024-09-04

## 2024-09-05 RX ORDER — IMMUNE GLOBULIN INTRAVENOUS (HUMAN) 10 G
10 KIT INTRAVENOUS
Refills: 0 | Status: ACTIVE | OUTPATIENT
Start: 2024-09-04

## 2024-09-05 RX ORDER — CAMPHOR 0.45 %
25 GEL (GRAM) TOPICAL
Refills: 0 | Status: ACTIVE | OUTPATIENT
Start: 2024-09-04

## 2024-09-05 RX ADMIN — Medication 5 MILLIGRAM(S): at 21:05

## 2024-09-05 RX ADMIN — ESCITALOPRAM OXALATE 10 MILLIGRAM(S): 10 TABLET ORAL at 11:38

## 2024-09-05 RX ADMIN — BENZTROPINE MESYLATE 1 MILLIGRAM(S): 2 TABLET ORAL at 17:36

## 2024-09-05 RX ADMIN — BENZTROPINE MESYLATE 1 MILLIGRAM(S): 2 TABLET ORAL at 05:32

## 2024-09-05 RX ADMIN — ACETAMINOPHEN 650 MILLIGRAM(S): 325 TABLET ORAL at 22:05

## 2024-09-05 RX ADMIN — Medication 50 MICROGRAM(S): at 05:32

## 2024-09-05 RX ADMIN — Medication 3 MILLIGRAM(S): at 21:06

## 2024-09-05 RX ADMIN — Medication 1 APPLICATION(S): at 17:37

## 2024-09-05 RX ADMIN — ACETAMINOPHEN 650 MILLIGRAM(S): 325 TABLET ORAL at 21:05

## 2024-09-05 RX ADMIN — Medication 40 MILLIGRAM(S): at 21:05

## 2024-09-05 RX ADMIN — CHLORHEXIDINE GLUCONATE 1 APPLICATION(S): 40 SOLUTION TOPICAL at 11:42

## 2024-09-05 RX ADMIN — Medication 1 APPLICATION(S): at 05:34

## 2024-09-05 RX ADMIN — ACETAMINOPHEN 650 MILLIGRAM(S): 325 TABLET ORAL at 15:14

## 2024-09-05 RX ADMIN — ENOXAPARIN SODIUM 40 MILLIGRAM(S): 100 INJECTION SUBCUTANEOUS at 05:32

## 2024-09-05 NOTE — BH CONSULTATION LIAISON PROGRESS NOTE - NSBHCHARTREVIEWLAB_PSY_A_CORE FT
CBC Full  -  ( 05 Sep 2024 05:39 )  WBC Count : 6.75 K/uL  RBC Count : 4.05 M/uL  Hemoglobin : 11.4 g/dL  Hematocrit : 33.8 %  Platelet Count - Automated : 175 K/uL  Mean Cell Volume : 83.5 fL  Mean Cell Hemoglobin : 28.1 pg  Mean Cell Hemoglobin Concentration : 33.7 gm/dL  Auto Neutrophil # : 4.86 K/uL  Auto Lymphocyte # : 0.99 K/uL  Auto Monocyte # : 0.66 K/uL  Auto Eosinophil # : 0.19 K/uL  Auto Basophil # : 0.03 K/uL  Auto Neutrophil % : 72.0 %  Auto Lymphocyte % : 14.7 %  Auto Monocyte % : 9.8 %  Auto Eosinophil % : 2.8 %  Auto Basophil % : 0.4 %  09-05    135  |  97<L>  |  9   ----------------------------<  92  4.1   |  24  |  0.90    Ca    9.2      05 Sep 2024 05:39  Phos  3.3     09-05  Mg     2.10     09-05

## 2024-09-05 NOTE — PROGRESS NOTE ADULT - ASSESSMENT
59 y/o M with PMHx of schizoaffective disorder (bipolar type, w/ multiple [facility] admissions), HTN, HLD, hypothyroidism, CVID/ hypogammaglobulinemia (on monthly IVIG, last on 7/23, follows with Dr. Mitchell Boxer) COPD not on home O2, hx of frequent skin infection (MRSA abscess / cellulitis w/ MRSA bacteremia s/p debridement 6/2023) who presented after labetalol overdose.

## 2024-09-05 NOTE — BH CONSULTATION LIAISON PROGRESS NOTE - NSBHASSESSMENTFT_PSY_ALL_CORE
59 yo male w PMHx of schizoaffective disorder (bipolar type with multiple [facility] admissions), HTN, HLD, hypothyroidism, CVID/hypogammaglobulinemia (monthly IVIG, last on 7/23), hx of frequent skin infection (MRSA, abscess/cellulitis w MRSA bacteremia s/p debridement 6/2023) presented to the ED with sisters after appearing unwell admitted for concern for suicide attempt. Psych c/s for SI.     Initial eval: Patient exhibits psychosis and depression, marked features of schizoaffective d/o actively present. Patient currently suicidal and unable to safety plan, will require further optimization of psychiatric medications and psychiatric hospitalization for safety/stabilization.    9/4: Pt reports improved mood, describing it as "excellent". He denies current AVH/SI/HI but admits that his "paranoid delusions" are triggered easily by "guilty" thoughts. Pt will require further assessment and psychiatric hospitalization for safety/stabilization given concern for SI/suicide attempt, no EPS/sfx noted on haldol.   9/5: Pt continues to describe good mood. Denies AVH/SI/HI. Awaiting medical clearance for admission to [facility].    Plans:  Continue 1:1 and safety precautions for suicidality  -C/w Haldol 5mg qHS PO  -C/w cogentin to 1 mg BIDPO  -C/w lexapro 10 mg daily PO  -PRN for agitation Haldol 5mg + Ativan mg q6h PRN PO/IM/IV  -Dispo, inpt psych once medically cleared    59 yo male w PMHx of schizoaffective disorder (bipolar type with multiple [facility] admissions), HTN, HLD, hypothyroidism, CVID/hypogammaglobulinemia (monthly IVIG, last on 7/23), hx of frequent skin infection (MRSA, abscess/cellulitis w MRSA bacteremia s/p debridement 6/2023) presented to the ED with sisters after appearing unwell admitted for concern for suicide attempt. Psych c/s for SI.     Initial eval: Patient exhibits psychosis and depression, marked features of schizoaffective d/o actively present. Patient currently suicidal and unable to safety plan, will require further optimization of psychiatric medications and psychiatric hospitalization for safety/stabilization.    9/4: Pt reports improved mood, describing it as "excellent". He denies current AVH/SI/HI but admits that his "paranoid delusions" are triggered easily by "guilty" thoughts. Pt will require further assessment and psychiatric hospitalization for safety/stabilization given concern for SI/suicide attempt, no EPS/sfx noted on haldol.   9/5: Pt continues to describe good mood. Denies AVH/SI/HI. Awaiting medical clearance for admission to [facility].    Plans:  Continue 1:1 and safety precautions for suicidality  -C/w Haldol 5mg qHS PO  -C/w cogentin to 1 mg BID PO  -C/w lexapro 10 mg daily PO  -PRN for agitation Haldol 5mg + Ativan mg q6h PRN PO/IM/IV  -Dispo, inpt psych once medically cleared

## 2024-09-05 NOTE — BH CONSULTATION LIAISON PROGRESS NOTE - NSBHCHARTREVIEWVS_PSY_A_CORE FT
Vital Signs Last 24 Hrs  T(C): 36.9 (05 Sep 2024 05:30), Max: 37.1 (04 Sep 2024 21:10)  T(F): 98.4 (05 Sep 2024 05:30), Max: 98.7 (04 Sep 2024 21:10)  HR: 72 (05 Sep 2024 05:30) (59 - 72)  BP: 151/86 (05 Sep 2024 05:30) (144/85 - 151/86)  BP(mean): --  RR: 17 (05 Sep 2024 05:30) (17 - 18)  SpO2: 99% (05 Sep 2024 05:30) (97% - 99%)    Parameters below as of 05 Sep 2024 05:30  Patient On (Oxygen Delivery Method): room air

## 2024-09-05 NOTE — BH CONSULTATION LIAISON PROGRESS NOTE - NSBHFUPINTERVALHXFT_PSY_A_CORE
Pt seen at bedside. He continues to describe his mood as "excellent," and states he feels much safer in the hospital than at home because he feels that "people are out to get me there." Reports a good appetite and slept through the night. He believes the meds are "helping me feel better."     States he looks forward to being admitted to [facility], has no other concerns.     Denies AVH/SI/HI    Pt seen at bedside. He continues to describe his mood as "excellent," and states he feels much safer in the hospital than at home because he feels that "people are out to get me there." Reports a good appetite and slept through the night. He believes the meds are "helping me feel better."     States he looks forward to the next step in his treatment, has no other concerns.     Denies AVH/SI/HI or paranoia

## 2024-09-06 LAB
-  CLINDAMYCIN: SIGNIFICANT CHANGE UP
-  DAPTOMYCIN: SIGNIFICANT CHANGE UP
-  ERYTHROMYCIN: SIGNIFICANT CHANGE UP
-  GENTAMICIN: SIGNIFICANT CHANGE UP
-  LINEZOLID: SIGNIFICANT CHANGE UP
-  OXACILLIN: SIGNIFICANT CHANGE UP
-  PENICILLIN: SIGNIFICANT CHANGE UP
-  RIFAMPIN: SIGNIFICANT CHANGE UP
-  TETRACYCLINE: SIGNIFICANT CHANGE UP
-  TRIMETHOPRIM/SULFAMETHOXAZOLE: SIGNIFICANT CHANGE UP
-  VANCOMYCIN: SIGNIFICANT CHANGE UP
METHOD TYPE: SIGNIFICANT CHANGE UP
SARS-COV-2 RNA SPEC QL NAA+PROBE: SIGNIFICANT CHANGE UP

## 2024-09-06 PROCEDURE — 99233 SBSQ HOSP IP/OBS HIGH 50: CPT

## 2024-09-06 RX ORDER — AMLODIPINE BESYLATE 10 MG/1
10 TABLET ORAL DAILY
Refills: 0 | Status: DISCONTINUED | OUTPATIENT
Start: 2024-09-06 | End: 2024-09-11

## 2024-09-06 RX ORDER — DOXYCYCLINE MONOHYDRATE 100 MG
100 TABLET ORAL EVERY 12 HOURS
Refills: 0 | Status: DISCONTINUED | OUTPATIENT
Start: 2024-09-06 | End: 2024-09-07

## 2024-09-06 RX ADMIN — ENOXAPARIN SODIUM 40 MILLIGRAM(S): 100 INJECTION SUBCUTANEOUS at 05:52

## 2024-09-06 RX ADMIN — BENZTROPINE MESYLATE 1 MILLIGRAM(S): 2 TABLET ORAL at 17:48

## 2024-09-06 RX ADMIN — AMLODIPINE BESYLATE 10 MILLIGRAM(S): 10 TABLET ORAL at 13:35

## 2024-09-06 RX ADMIN — CHLORHEXIDINE GLUCONATE 1 APPLICATION(S): 40 SOLUTION TOPICAL at 11:47

## 2024-09-06 RX ADMIN — Medication 100 MILLIGRAM(S): at 21:14

## 2024-09-06 RX ADMIN — Medication 5 MILLIGRAM(S): at 21:13

## 2024-09-06 RX ADMIN — BENZTROPINE MESYLATE 1 MILLIGRAM(S): 2 TABLET ORAL at 05:11

## 2024-09-06 RX ADMIN — Medication 40 MILLIGRAM(S): at 21:13

## 2024-09-06 RX ADMIN — Medication 50 MICROGRAM(S): at 05:11

## 2024-09-06 RX ADMIN — Medication 1 APPLICATION(S): at 05:12

## 2024-09-06 RX ADMIN — ESCITALOPRAM OXALATE 10 MILLIGRAM(S): 10 TABLET ORAL at 11:47

## 2024-09-06 RX ADMIN — Medication 1 APPLICATION(S): at 17:47

## 2024-09-06 RX ADMIN — Medication 100 MILLIGRAM(S): at 11:50

## 2024-09-06 NOTE — DISCHARGE NOTE PROVIDER - NSDCFUADDAPPT_GEN_ALL_CORE_FT
Dermatology will follow up biopsy results and call your family with the results    Occipital Scalp: Cleanse with NS, pat dry. Apply Liquid barrier film to periwound skin (allow to dry). Apply Medihoney gel to base of wound, cover with silicone foam with border. Change daily and PRN if soiled.    Continue low air loss bed therapy, continue heel elevation, continue to turn & reposition per protocol, soft pillow between bony prominences, continue moisture management with barrier creams & single breathable pad, continue measures to decrease friction/shear/pressure. Continue with nutritional support as per dietary/orders.    Recommend continuing to follow up care at [facility]: [phone]. Address: [address] [place] [address].

## 2024-09-06 NOTE — DISCHARGE NOTE PROVIDER - NSDCFUSCHEDAPPT_GEN_ALL_CORE_FT
[facility]  [address]  Scheduled Appointment: 09/12/2024

## 2024-09-06 NOTE — BH CONSULTATION LIAISON PROGRESS NOTE - NSBHFUPINTERVALHXFT_PSY_A_CORE
Pt seen at bedside, states he is feeling "fine" and his mood is "good". Reports less sleep last night (4hrs) as he was watching a football game, states he did not feel tired because "I slept a lot during the daytime yesterday."     States he thinks he is doing "really well" but admits that he is unsure what might trigger the paranoid thoughts which prompt him to harm himself.      Denies current AVH/SI/HI or paranoia, describes feeling safe.

## 2024-09-06 NOTE — BH CONSULTATION LIAISON PROGRESS NOTE - NSBHCHARTREVIEWVS_PSY_A_CORE FT
Vital Signs Last 24 Hrs  T(C): 37.1 (06 Sep 2024 05:10), Max: 37.1 (06 Sep 2024 05:10)  T(F): 98.7 (06 Sep 2024 05:10), Max: 98.7 (06 Sep 2024 05:10)  HR: 62 (06 Sep 2024 05:10) (62 - 84)  BP: 139/82 (06 Sep 2024 05:10) (125/71 - 166/91)  BP(mean): --  RR: 17 (06 Sep 2024 05:10) (17 - 18)  SpO2: 98% (06 Sep 2024 05:10) (98% - 100%)    Parameters below as of 06 Sep 2024 05:10  Patient On (Oxygen Delivery Method): room air

## 2024-09-06 NOTE — PROGRESS NOTE ADULT - NSPROGADDITIONALINFOA_GEN_ALL_CORE
Kemal Tyson will be covering for the pt starting 9/7/24. He can be reached at  if needed.     d/w GERMAN Henderson.     - Dr. JAKE Morales ([facility])  - [phone]
open small boil back of the head. no bleeding. derm consulted. s/p biopsy. path pending.   bilateral arm pit, small 1 cm funicle. wound care.   no fever, no chills. no leukocytosis. monitoring off abx.    monthly IVIG, last dose 8/15, next dose around 9/12.     stable hemodynamically for [facility] transfer.  can follow path as outpt.   can resume beta blocker on discharge.     - Dr. JAKE Matoset ([facility])  - [phone]
open small boil back of the head. no bleeding. derm consulted.   bilateral arm pit, small 1 cm funicle    wound care.     - Dr. JAKE Morales ([facility])  - [phone]

## 2024-09-06 NOTE — PROGRESS NOTE ADULT - ASSESSMENT
57 y/o M with PMHx of schizoaffective disorder (bipolar type, w/ multiple [facility] admissions), HTN, HLD, hypothyroidism, CVID/ hypogammaglobulinemia (on monthly IVIG, last on 7/23, follows with Dr. Mitchell Boxer) COPD not on home O2, hx of frequent skin infection (MRSA abscess / cellulitis w/ MRSA bacteremia s/p debridement 6/2023) who presented after labetalol overdose.

## 2024-09-06 NOTE — DISCHARGE NOTE PROVIDER - HOSPITAL COURSE
57 y/o M PMHx schizoaffective disorder (bipolar type, w/ multiple [facility] admissions), HTN, hypothyroidism, CVID/ hypogammaglobulinemia (on monthly IVIG, last on 7/23) COPD, hx of frequent skin infection (MRSA abscess / cellulitis w/ MRSA bacteremia s/p debridement 6/2023) who presented after overdosing on labetalol    scalp abscess  afebrile, no leukocytosis  scalp furuncle w/ purulent drainage  culture growing staph aureus  also likely R axilla furuncle    Recommendations  start doxycycline 100mg bid for now  plan for 7 day course of antibiotics  f/u scalp abscess staph aureus sensitivities    57 y/o M with PMHx of schizoaffective disorder (bipolar type, w/ multiple [facility] admissions), HTN, HLD, hypothyroidism, CVID/ hypogammaglobulinemia (on monthly IVIG, last on 7/23, follows with Dr. Mitchell Boxer) COPD not on home O2, hx of frequent skin infection (MRSA abscess / cellulitis w/ MRSA bacteremia s/p debridement 6/2023) who presented after labetalol overdose.       Problem/Plan - 1:  ·  Problem: Suicide attempt by beta blocker overdose.   ·  Plan: -Patient presented to the hospital after intentional ingestion of 20 labetalol pills in setting of paranoid delusions. Hx of prior suicide attempts with the same, previously requiring ICU level care.  -Tox screen negative. Vitals initially stable, however became more hypotensive and somnolent. He was given Ca gluconate, NS and glucagon in the ED. Evaluated by MICU team and was hemodynamically stable, deemed not an ICU candidate. currently stable hemodynamics.   -psyc eval appreciated. resume Haldol, cogentin, lexapro 10 mg   -Trend BP, HR, and monitor for mental status changes  -Continue 1:1 and safety precautions  -Monitor on Telemetry, now mental status back to baseline.     Problem/Plan - 2:  ·  Problem: HTN (hypertension).   ·  Plan: -initially holding home labetalol and amlodipine in setting of above  -can resume since he is stable hemodynamics.     Problem/Plan - 3:  ·  Problem: DM (diabetes mellitus).   ·  Plan: -Hold home metformin 500mg BID  -Add ISS as needed  -Hypoglycemia protocol  -can resume on discharge.     Problem/Plan - 4:  ·  Problem: CVID (common variable immunodeficiency).   ·  Plan: -Diagnosed at 20 y/o, follows with Dr. Mitchell Boxer  -Receives monthly IVIG, last dose 8/15, next dose approximately around 9/12 (typically Gammagard S/D formula).     Problem/Plan - 5:  ·  Problem: Hypothyroidism.   ·  Plan: -Continue levothyroxine 50mcg qd.     Problem/Plan - 6:  ·  Problem: Hyperlipidemia.   ·  Plan: -Continue atorvastatin    # Open small boil back of the head  no bleeding. no pus.   derm consulted. s/p biopsy. path pending. Staph growing. f/u sensitivities.  Doxycyline BID per ID.   bilateral arm pit, small 1 cm funicle. wound care. close monitoring.     stable hemodynamically for [facility] transfer.  can follow up cx sensitivities outpt. ID follow up.   59 y/o M PMHx schizoaffective disorder (bipolar type, w/ multiple [facility] admissions), HTN, hypothyroidism, CVID/ hypogammaglobulinemia (on monthly IVIG, last on 7/23) COPD, hx of frequent skin infection (MRSA abscess / cellulitis w/ MRSA bacteremia s/p debridement 6/2023) who presented after overdosing on labetalol.     Pt being followed by psychiatry, plan for transfer to [facility] for further management. Pt seen by Dermatology for scalp lesion. Pt has history of frequent MRSA skin infections. Biopsy was obtained and derm will follow up with family with results. Culture growing staph aureus, ID consulted fos likely scalp furuncle w/ purulent drainage, also likely R axilla furuncle. Started on doxycycline 100mg bid for now, plan for 7 day course of antibiotics  f/u scalp abscess staph aureus sensitivities.       stable hemodynamically for [facility] transfer.  can follow up cx sensitivities outpt and ID follow up   59 y/o M with PMHx of schizoaffective disorder (bipolar type, w/ multiple [facility] admissions), HTN, HLD, hypothyroidism, CVID/ hypogammaglobulinemia (on monthly IVIG, last on 7/23, follows with Dr. Mitchell Boxer) COPD not on home O2, hx of frequent skin infection (MRSA abscess / cellulitis w/ MRSA bacteremia s/p debridement 6/2023) who presented after labetalol overdose.    Suicide attempt by beta blocker overdose  -patient presented to the hospital after intentional ingestion of 20 labetalol pills in setting of paranoid delusions. Hx of prior suicide attempts with the same, previously requiring ICU level care.  -tox screen negative. Vitals initially stable, however became more hypotensive and somnolent. He was given Ca gluconate, NS and glucagon in the ED. Evaluated by MICU team and was hemodynamically stable, deemed not an ICU candidate. currently stable hemodynamics.   -cont haldol, cogentin, lexapro 10 mg   -continue 1:1 and safety precautions  -medically cleared for [facility]    HTN  -initially held home labetalol and amlodipine in setting of above  -resumed since he is stable hemodynamics    Scalp abscess  -open small boil back of the head  -bilateral arm pit, small 1 cm funicle. wound care. close monitoring.   -derm consulted. s/p biopsy 9/4/24. path pending.  -clindamycin switched to vancomycin on 9/8/24 due to increased purulent drainage; vanco trough ordered for evening of 9/9; switched to clindamycin 9/10/24 x 5 days  -s/p washout and debridement by surgery 9/8/24, appreciate intervention    CVID (common variable immunodeficiency)  -diagnosed at 18 y/o, follows with Dr. Mitchell Boxer  -receives monthly IVIG (typically Gammagard S/D formula), last dose 8/15, next dose given 9/9/24 without incident

## 2024-09-06 NOTE — DISCHARGE NOTE PROVIDER - CARE PROVIDER_API CALL
Zandra Gilliland  Dermatology  [address], [address]  [place] [address]  Phone: [phone]  Fax: [phone]  Follow Up Time:

## 2024-09-06 NOTE — DISCHARGE NOTE PROVIDER - NSDCMRMEDTOKEN_GEN_ALL_CORE_FT
benztropine 1 mg oral tablet: 1 tab(s) orally once a day (at bedtime)  Flomax 0.4 mg oral capsule: 1 cap(s) orally once a day (at bedtime)  Haldol Decanoate 100 mg/mL intramuscular solution: 200 milligram(s) intramuscularly every 3 weeks Last administered on 8/28/24; NEXT DUE 9/18/24  hydrocortisone 1% topical cream: 1 Apply topically to affected area 2 times a day As needed rash on fingers  labetalol 200 mg oral tablet: 1 tab(s) orally 2 times a day  Lexapro 10 mg oral tablet: 1 tab(s) orally once a day  Lipitor 40 mg oral tablet: 1 tab(s) orally once a day (at bedtime)  melatonin 3 mg oral tablet: 1 tab(s) orally once a day (at bedtime) As needed Insomnia  metFORMIN 500 mg oral tablet: 1 tab(s) orally 2 times a day  Norvasc 10 mg oral tablet: 1 tab(s) orally once a day  polyethylene glycol 3350 oral powder for reconstitution: 17 gram(s) orally once a day As needed constipation  Sodium Chloride 1 g oral tablet: 1 tab(s) orally 3 times a day  Synthroid 50 mcg (0.05 mg) oral tablet: 1 tab(s) orally once a day   benztropine 1 mg oral tablet: 1 tab(s) orally once a day (at bedtime)  clindamycin 150 mg oral capsule: 3 cap(s) orally every 8 hours LAST DAY 9/15/24  Flomax 0.4 mg oral capsule: 1 cap(s) orally once a day (at bedtime)  haloperidol 5 mg oral tablet: 1 tab(s) orally once a day (at bedtime)  labetalol 200 mg oral tablet: 1 tab(s) orally 2 times a day  Lexapro 10 mg oral tablet: 1 tab(s) orally once a day  Lipitor 40 mg oral tablet: 1 tab(s) orally once a day (at bedtime)  melatonin 3 mg oral tablet: 1 tab(s) orally once a day (at bedtime) As needed Insomnia  metFORMIN 500 mg oral tablet: 1 tab(s) orally 2 times a day  Norvasc 10 mg oral tablet: 1 tab(s) orally once a day  polyethylene glycol 3350 oral powder for reconstitution: 17 gram(s) orally once a day As needed constipation  Synthroid 50 mcg (0.05 mg) oral tablet: 1 tab(s) orally once a day

## 2024-09-06 NOTE — CONSULT NOTE ADULT - ASSESSMENT
59 y/o M PMHx schizoaffective disorder (bipolar type, w/ multiple [facility] admissions), HTN, hypothyroidism, CVID/ hypogammaglobulinemia (on monthly IVIG, last on 7/23) COPD, hx of frequent skin infection (MRSA abscess / cellulitis w/ MRSA bacteremia s/p debridement 6/2023) who presented after overdosing on labetalol    scalp abscess  scalp furuncle w/ purulent drainage  culture growing staph aureus  also likely R axilla furuncle    Recommendations  start doxycycline 100mg bid for now  plan for 7 day course of antibiotics  f/u scalp abscess staph aureus sensitivities    Milton Christine M.D.  [facility], Division of Infectious Diseases  [phone]  After 5pm on weekdays and all day on weekends - please call [phone]  57 y/o M PMHx schizoaffective disorder (bipolar type, w/ multiple [facility] admissions), HTN, hypothyroidism, CVID/ hypogammaglobulinemia (on monthly IVIG, last on 7/23) COPD, hx of frequent skin infection (MRSA abscess / cellulitis w/ MRSA bacteremia s/p debridement 6/2023) who presented after overdosing on labetalol    scalp abscess  afebrile, no leukocytosis  scalp furuncle w/ purulent drainage  culture growing staph aureus  also likely R axilla furuncle    Recommendations  start doxycycline 100mg bid for now  plan for 7 day course of antibiotics  f/u scalp abscess staph aureus sensitivities    Milton Christine M.D.  [facility], Division of Infectious Diseases  [phone]  After 5pm on weekdays and all day on weekends - please call [phone]

## 2024-09-06 NOTE — BH CONSULTATION LIAISON PROGRESS NOTE - NSBHASSESSMENTFT_PSY_ALL_CORE
57 yo male w PMHx of schizoaffective disorder (bipolar type with multiple [facility] admissions), HTN, HLD, hypothyroidism, CVID/hypogammaglobulinemia (monthly IVIG, last on 7/23), hx of frequent skin infection (MRSA, abscess/cellulitis w MRSA bacteremia s/p debridement 6/2023) presented to the ED with sisters after appearing unwell admitted for concern for suicide attempt. Psych c/s for SI.     Initial eval: Patient exhibits psychosis and depression, marked features of schizoaffective d/o actively present. Patient currently suicidal and unable to safety plan, will require further optimization of psychiatric medications and psychiatric hospitalization for safety/stabilization.    9/4: Pt reports improved mood, describing it as "excellent". He denies current AVH/SI/HI but admits that his "paranoid delusions" are triggered easily by "guilty" thoughts. Pt will require further assessment and psychiatric hospitalization for safety/stabilization given concern for SI/suicide attempt, no EPS/sfx noted on haldol.   9/5: Pt continues to describe good mood. Denies AVH/SI/HI. Awaiting medical clearance for admission to [facility].  9/6: Pt A&O, calm and cooperative. Pt endorses good mood but admits that his paranoid thoughts and SI are triggered by external stimuli that he cannot predict. Denies current SI/HI/AVH.     Plans:  Continue 1:1 and safety precautions for suicidality  -C/w Haldol 5mg qHS PO  -C/w cogentin to 1 mg BID PO  -C/w lexapro 10 mg daily PO  -PRN for agitation Haldol 5mg + Ativan mg q6h PRN PO/IM/IV  -Dispo, inpt psych once medically cleared    59 yo male w PMHx of schizoaffective disorder (bipolar type with multiple [facility] admissions), HTN, HLD, hypothyroidism, CVID/hypogammaglobulinemia (monthly IVIG, last on 7/23), hx of frequent skin infection (MRSA, abscess/cellulitis w MRSA bacteremia s/p debridement 6/2023) presented to the ED with sisters after appearing unwell admitted for concern for suicide attempt. Psych c/s for SI.     Initial eval: Patient exhibits psychosis and depression, marked features of schizoaffective d/o actively present. Patient currently suicidal and unable to safety plan, will require further optimization of psychiatric medications and psychiatric hospitalization for safety/stabilization.    9/4: Pt reports improved mood, describing it as "excellent". He denies current AVH/SI/HI but admits that his "paranoid delusions" are triggered easily by "guilty" thoughts. Pt will require further assessment and psychiatric hospitalization for safety/stabilization given concern for SI/suicide attempt, no EPS/sfx noted on haldol.   9/5: Pt continues to describe good mood. Denies AVH/SI/HI. Awaiting medical clearance for admission to [facility].  9/6: Pt A&O, calm and cooperative. Pt endorses good mood but admits that his paranoid thoughts and SI are triggered by external stimuli that he cannot predict. Denies current SI/HI/AVH. Denies suicidality/intent or plan and denies homicidality/intent or plan but admits to OD on meds.     Plans:  Continue 1:1 and safety precautions for suicidality  -C/w Haldol 5mg qHS PO  -C/w cogentin to 1 mg BID PO  -C/w lexapro 10 mg daily PO  -PRN for agitation Haldol 5mg + Ativan mg q6h PRN PO/IM/IV  -Dispo, inpt psych once medically cleared

## 2024-09-06 NOTE — DISCHARGE NOTE PROVIDER - NSDCCPCAREPLAN_GEN_ALL_CORE_FT
PRINCIPAL DISCHARGE DIAGNOSIS  Diagnosis: Overdose  Assessment and Plan of Treatment: You overdosed on prescription medications. Psychiatry evaluated you and adjusted your medication regimen. You are being admitted to the [facility] for further care.      SECONDARY DISCHARGE DIAGNOSES  Diagnosis: Scalp abscess  Assessment and Plan of Treatment: You were noted to have a scalp abscess. Dermatology biopsied this on 9/4. Results are still pending. Surgery performed a washout and debridement on 9/8. You require antibiotics. Please take clindamycin until 9/15/24. Wound care instructions as below. Wound culture with MRSA. After discharge can attempt MRSA decolonization with hibaclens bath 3x per week x 2 weeks    Diagnosis: CVID (common variable immunodeficiency)  Assessment and Plan of Treatment: IVIG administered 9/9/24 inpatient at [facility]. Please continue monthly IVIG.

## 2024-09-07 DIAGNOSIS — L02.811 CUTANEOUS ABSCESS OF HEAD [ANY PART, EXCEPT FACE]: ICD-10-CM

## 2024-09-07 RX ORDER — CLINDAMYCIN PHOSPHATE 150 MG/ML
450 VIAL (ML) INJECTION EVERY 8 HOURS
Refills: 0 | Status: DISCONTINUED | OUTPATIENT
Start: 2024-09-07 | End: 2024-09-07

## 2024-09-07 RX ORDER — CLINDAMYCIN PHOSPHATE 150 MG/ML
450 VIAL (ML) INJECTION EVERY 8 HOURS
Refills: 0 | Status: DISCONTINUED | OUTPATIENT
Start: 2024-09-07 | End: 2024-09-08

## 2024-09-07 RX ADMIN — Medication 1 APPLICATION(S): at 05:24

## 2024-09-07 RX ADMIN — Medication 5 MILLIGRAM(S): at 21:18

## 2024-09-07 RX ADMIN — ESCITALOPRAM OXALATE 10 MILLIGRAM(S): 10 TABLET ORAL at 11:13

## 2024-09-07 RX ADMIN — BENZTROPINE MESYLATE 1 MILLIGRAM(S): 2 TABLET ORAL at 05:23

## 2024-09-07 RX ADMIN — Medication 40 MILLIGRAM(S): at 21:17

## 2024-09-07 RX ADMIN — Medication 450 MILLIGRAM(S): at 16:36

## 2024-09-07 RX ADMIN — Medication 50 MICROGRAM(S): at 05:24

## 2024-09-07 RX ADMIN — Medication 200 MILLIGRAM(S): at 05:23

## 2024-09-07 RX ADMIN — AMLODIPINE BESYLATE 10 MILLIGRAM(S): 10 TABLET ORAL at 05:23

## 2024-09-07 RX ADMIN — Medication 200 MILLIGRAM(S): at 17:38

## 2024-09-07 RX ADMIN — Medication 3 MILLIGRAM(S): at 21:17

## 2024-09-07 RX ADMIN — BENZTROPINE MESYLATE 1 MILLIGRAM(S): 2 TABLET ORAL at 17:39

## 2024-09-07 RX ADMIN — Medication 1 APPLICATION(S): at 17:39

## 2024-09-07 RX ADMIN — CHLORHEXIDINE GLUCONATE 1 APPLICATION(S): 40 SOLUTION TOPICAL at 11:13

## 2024-09-07 RX ADMIN — ENOXAPARIN SODIUM 40 MILLIGRAM(S): 100 INJECTION SUBCUTANEOUS at 05:50

## 2024-09-07 RX ADMIN — Medication 450 MILLIGRAM(S): at 09:14

## 2024-09-07 NOTE — PROGRESS NOTE ADULT - ASSESSMENT
57 y/o M PMHx schizoaffective disorder (bipolar type, w/ multiple [facility] admissions), HTN, hypothyroidism, CVID/ hypogammaglobulinemia (on monthly IVIG, last on 7/23) COPD, hx of frequent skin infection (MRSA abscess / cellulitis w/ MRSA bacteremia s/p debridement 6/2023) who presented after overdosing on labetalol    scalp abscess  afebrile, no leukocytosis  scalp furuncle w/ purulent drainage  culture growing staph aureus  also likely R axilla furuncle  scalp abscess cx w/ MRSA resistant to tetracycline so my be resistant to doxycycline    Recommendations  switch doxycycline to clindamycin 450mg PO every 8 hours x 7 days  discharge planning    Milton Christine M.D.  [facility], Division of Infectious Diseases  [phone]  After 5pm on weekdays and all day on weekends - please call [phone]

## 2024-09-07 NOTE — PROGRESS NOTE ADULT - ASSESSMENT
59 y/o M with PMHx of schizoaffective disorder (bipolar type, w/ multiple [facility] admissions), HTN, HLD, hypothyroidism, CVID/ hypogammaglobulinemia (on monthly IVIG, last on 7/23, follows with Dr. Mitchell Boxer) COPD not on home O2, hx of frequent skin infection (MRSA abscess / cellulitis w/ MRSA bacteremia s/p debridement 6/2023) who presented after labetalol overdose.

## 2024-09-08 RX ORDER — VANCOMYCIN/0.9 % SOD CHLORIDE 1.75G/25
1500 PLASTIC BAG, INJECTION (ML) INTRAVENOUS EVERY 12 HOURS
Refills: 0 | Status: DISCONTINUED | OUTPATIENT
Start: 2024-09-08 | End: 2024-09-10

## 2024-09-08 RX ADMIN — BENZTROPINE MESYLATE 1 MILLIGRAM(S): 2 TABLET ORAL at 17:07

## 2024-09-08 RX ADMIN — Medication 300 MILLIGRAM(S): at 08:37

## 2024-09-08 RX ADMIN — Medication 50 MICROGRAM(S): at 05:02

## 2024-09-08 RX ADMIN — CHLORHEXIDINE GLUCONATE 1 APPLICATION(S): 40 SOLUTION TOPICAL at 11:31

## 2024-09-08 RX ADMIN — Medication 1 APPLICATION(S): at 17:06

## 2024-09-08 RX ADMIN — AMLODIPINE BESYLATE 10 MILLIGRAM(S): 10 TABLET ORAL at 05:03

## 2024-09-08 RX ADMIN — Medication 450 MILLIGRAM(S): at 00:19

## 2024-09-08 RX ADMIN — ESCITALOPRAM OXALATE 10 MILLIGRAM(S): 10 TABLET ORAL at 11:30

## 2024-09-08 RX ADMIN — Medication 3 MILLIGRAM(S): at 21:56

## 2024-09-08 RX ADMIN — Medication 5 MILLIGRAM(S): at 21:50

## 2024-09-08 RX ADMIN — Medication 300 MILLIGRAM(S): at 21:50

## 2024-09-08 RX ADMIN — Medication 1 APPLICATION(S): at 05:03

## 2024-09-08 RX ADMIN — Medication 40 MILLIGRAM(S): at 21:49

## 2024-09-08 RX ADMIN — Medication 200 MILLIGRAM(S): at 05:03

## 2024-09-08 RX ADMIN — BENZTROPINE MESYLATE 1 MILLIGRAM(S): 2 TABLET ORAL at 05:03

## 2024-09-08 RX ADMIN — ENOXAPARIN SODIUM 40 MILLIGRAM(S): 100 INJECTION SUBCUTANEOUS at 05:43

## 2024-09-08 NOTE — PROGRESS NOTE ADULT - ASSESSMENT
57 y/o M PMHx schizoaffective disorder (bipolar type, w/ multiple [facility] admissions), HTN, hypothyroidism, CVID/ hypogammaglobulinemia (on monthly IVIG, last on 7/23) COPD, hx of frequent skin infection (MRSA abscess / cellulitis w/ MRSA bacteremia s/p debridement 6/2023) who presented after overdosing on labetalol    MRSA scalp abscess  afebrile, no leukocytosis  scalp furuncle w/ purulent drainage  culture growing staph aureus  also likely R axilla furuncle    Recommendations  significant purulent drainage from scalp  will switch clindamycin to vancomycin  surgery eval for possible I&D    Milton Christine M.D.  [facility], Division of Infectious Diseases  [phone]  After 5pm on weekdays and all day on weekends - please call [phone]

## 2024-09-08 NOTE — PROGRESS NOTE ADULT - ASSESSMENT
57 y/o M with PMHx of schizoaffective disorder (bipolar type, w/ multiple [facility] admissions), HTN, HLD, hypothyroidism, CVID/ hypogammaglobulinemia (on monthly IVIG, last on 7/23, follows with Dr. Mitchell Boxer) COPD not on home O2, hx of frequent skin infection (MRSA abscess / cellulitis w/ MRSA bacteremia s/p debridement 6/2023) who presented after labetalol overdose.

## 2024-09-08 NOTE — CONSULT NOTE ADULT - ASSESSMENT
57 y/o M PMHx schizoaffective disorder (bipolar type, w/ multiple [facility] admissions), HTN, hypothyroidism, CVID/ hypogammaglobulinemia (on monthly IVIG, last on 7/23) COPD, hx of frequent skin infection (MRSA abscess / cellulitis w/ MRSA bacteremia s/p debridement 6/2023) who presented after overdosing on labetalol. Surgery consulted for MRSA scalp abscess.     PLAN:   - No acute surgical intervention  - Patient afebrile, normal WBC, Reports symptomatic improvement, no longer painful.   - Wound currently draining, today: washed out, debrided and new dressing placed at bedside by the surgical team.   - Please change dressing daily and PRN, continued drainage expected.   - Continue Antibiotics per ID, currently on Vancomycin (switched form clindamycin today).         Kym Mosher PGY3  B Team surgery  [id number]    57 y/o M PMHx schizoaffective disorder (bipolar type, w/ multiple [facility] admissions), HTN, hypothyroidism, CVID/ hypogammaglobulinemia (on monthly IVIG, last on 7/23) COPD, hx of frequent skin infection (MRSA abscess / cellulitis w/ MRSA bacteremia s/p debridement 6/2023) who presented after overdosing on labetalol. Surgery consulted for MRSA scalp abscess.     PLAN:   - No acute surgical intervention  - Patient afebrile, normal WBC, Reports symptomatic improvement, no longer painful.   - Wound currently draining, today: washed out, debrided and new dressing placed at bedside by the surgical team.   - Please change dressing daily and PRN, continued drainage expected.   - Continue Antibiotics per ID, currently on Vancomycin (switched form clindamycin today).   - If patient develops develops fevers, worsening pain or elevated WBC recommend obtaining imaging          Kym Mosher PGY3  B Team surgery  [id number]

## 2024-09-08 NOTE — CONSULT NOTE ADULT - ATTENDING COMMENTS
I was physically present for the key portions of the evaluation and management (E/M) service provided.  I agree with the above history, physical, and plan which I have reviewed and edited where appropriate.
pt is a 59 yo male with hx schizoaffective disorder , hx suicide attempt with labetalol  presents s/p ingestion of reported labetalol 100 mg twenty tablets, Pt alert and awake  in no acute distress.  Asked to evaluate for beta blocker overdose,  Pt given iv calcium and glucagon.   Noted HR in the 60's, bp 110/64 rr 18  neck supple.  lungs clear  b/l heart s1s2 abdomen mildly distended nontender  ext no edema neuro no tremors, nonfocal    labs   wbc 10 hgb 11 hct 33  bicarb 23 cr 1.0    ekg no acute st t changes     A/p  59 yo male with possible beta blocker overdose labetalol 100 mg  ? twenty tabs, , asymptomatic hemodynamically stable,  -s/p calcium, glucagon, evaluated by tox  -can continue telemetry, monitor pulse ox  - continue ivf , monitor urine output  -psych evaluation  -pt does  not require icu level care
I have reviewed the history, pertinent labs and imaging, and discussed the care with the consult resident.  More than 50% of this 55 minute encounter including face to face with the patient was spent counseling and/or coordination of care on scalp abscess.  Time included review of vitals, labs, imaging, discussion with consultants and coordination with the operating room/emergency department.    57yo M admitted for SA with labetalol, found to have posterior scalp abscess. Drained spontaneously. Symptomatically improved. No tract to probe or pack.     - Continue daily dressing changes   - abx per primary team   - no surgical intervention indicated at this time      The active issues are:  1. posterior scalp abscess     The Acute Care Surgery (B Team) Attending Group Practice:  Dr. Ifeoma Soriano    urgent issues - spectra [phone]  nonurgent issues - [phone]  patient appointments or afterhours - [phone]
Emergency Medicine / Medical Toxicology Attending MD Panda:    Patient endorsing large labetalol overdose, with access to amlodipine.  Patient exhibiting mild hypotension without bradycardia that warrants resuscitation with IV fluid, intravenous calcium, trial of glucagon 5 mg.  Would have a low threshold to initiate high-dose insulin euglycemic therapy, should the patient require norepinephrine in excess of 1 mg/kg/min.  If patient is awake and alert enough to tolerate activated charcoal, would recommend 100 g charcoal p.o. x 1  Please reconsult medical toxicology if the patient requires escalating doses of vasopressors.

## 2024-09-08 NOTE — CONSULT NOTE ADULT - SUBJECTIVE AND OBJECTIVE BOX
[facility], Division of Infectious Diseases  MEGHANA Sosa S. Shah, Y. Patel, G. Emmett  [phone]  ([phone] - weekdays after 5pm and weekends)    DAT WOOD  58y, Male  [id number]    HPI--  HPI:  57 y/o M with PMHx of schizoaffective disorder (bipolar type, w/ multiple [facility] admissions), HTN, HLD, hypothyroidism, CVID/ hypogammaglobulinemia (on monthly IVIG, last on 7/23, follows with Dr. Mitchell Boxer) COPD not on home O2, hx of frequent skin infection (MRSA abscess / cellulitis w/ MRSA bacteremia s/p debridement 6/2023) who presented after overdosing on labetalol. Patient states he was at the store earlier today when he heard kids yelling and "had paranoid delusions" about the police coming after him. He went home and ingested 20 labetalol pills with an intent to harm himself. He states he took his other home medications as prescribed and denies taking extra doses. He denies taking any other substances. He reported SOB, now resolved, but otherwise denied any other symptoms. He states he is scared that people are listening to him.     Of note, patient was last hospitalized in 7/2024 after a suicide attempt with labetalol, requiring pressors and insulin gtt with improvement. His hospital course was complicated by cellulitis of the L leg s/p a course of abx with continued wound care, TASH due to ATN vs AIN, now improved. He was transferred to [facility] on 7/22 for IVIG infusions, then transferred back to [facility] for psychiatric treatment. He was discharged home on 8/28.    ED Course:  Afebrile, /91, HR 75, RR 16, 99% on RA  Toxicology consulted and recommended charcoal, however patient was somnolent and became more hypotensive  Given glucagon, Ca gluconate, NS  MICU consulted and pt deemed not an ICU candidate  Admitted to Medicine for further management (03 Sep 2024 13:20)    He was found to have scalp lesion s/p evaluation by dermatology. Culture taken growing staph aureus. Patient w/ history of MRSA skin furuncles. MRSA PCR positive.    ROS: 10 point review of systems completed, pertinent positives and negatives as per HPI.    Allergies: Gammagard (Short breath; Rash)  penicillin (Rash)  amoxicillin (Fever)    PMH -- Bipolar 1 disorder    IgG deficiency    Tracheal/bronchial disease    Dental caries    Bronchiectasis    Smoker    DM (diabetes mellitus)    Dental caries    HTN (hypertension)    Hypertriglyceridemia    Hypogammaglobulinemia    Abscess of finger    Bipolar disorder    Chronic hyponatremia    CVID (common variable immunodeficiency)    Hyponatremia    Smoker      PSH -- No significant past surgical history    Deviated septum    Loss of teeth due to extraction      FH -- Family history of throat cancer (Father)    Family history of leukemia (Mother)      Social History -- denies alcohol or illicit drug use, currently smokes cigarettes- 1ppd    Physical Exam--  Vital Signs Last 24 Hrs  T(F): 98.7 (06 Sep 2024 05:10), Max: 98.7 (06 Sep 2024 05:10)  HR: 62 (06 Sep 2024 05:10) (62 - 84)  BP: 139/82 (06 Sep 2024 05:10) (125/71 - 166/91)  RR: 17 (06 Sep 2024 05:10) (17 - 18)  SpO2: 98% (06 Sep 2024 05:10) (98% - 100%)  General: nontoxic-appearing, no acute distress  HEENT: anicteric  Lungs: Clear bilaterally without rales  Heart: S1, S2, normal rate  Abdomen: Soft. Nondistended. Nontender.   Neuro: no obvious focal deficits   Extremities: No LE edema.   Skin: scalp furuncle w/ purulent drainage, palpable lesion L axilla without tenderness, palpable lesion R axilla w/ mild tenderness, minimal erythema  Psychiatric: Appropriate affect and mood for situation.     Laboratory & Imaging Data--  CBC:                       11.4   6.75  )-----------( 175      ( 05 Sep 2024 05:39 )             33.8     WBC Count: 6.75 K/uL (09-05-24 @ 05:39)  WBC Count: 6.66 K/uL (09-04-24 @ 04:55)  WBC Count: 9.58 K/uL (09-02-24 @ 20:45)    CMP: 09-05    135  |  97<L>  |  9   ----------------------------<  92  4.1   |  24  |  0.90    Ca    9.2      05 Sep 2024 05:39  Phos  3.3     09-05  Mg     2.10     09-05        Urinalysis Basic - ( 05 Sep 2024 05:39 )    Color: x / Appearance: x / SG: x / pH: x  Gluc: 92 mg/dL / Ketone: x  / Bili: x / Urobili: x   Blood: x / Protein: x / Nitrite: x   Leuk Esterase: x / RBC: x / WBC x   Sq Epi: x / Non Sq Epi: x / Bacteria: x      Microbiology:     Culture - Tissue with Gram Stain (collected 09-04-24 @ 17:02)  Source: Tissue Other, occipital scalp  Gram Stain (09-05-24 @ 22:03):    No polymorphonuclear cells seen per low power field    No organisms seen per oil power field  Preliminary Report (09-05-24 @ 22:03):    Few Staphylococcus aureus    Culture - Acid Fast - Tissue w/Smear (collected 09-04-24 @ 16:20)  Source: Tissue Other, occipital scalp    Culture - Fungal, Tissue (collected 09-04-24 @ 16:20)  Source: Tissue Other, occipital scalp  Preliminary Report (09-05-24 @ 10:58):    Testing in progress        Radiology--  ***  Active Medications--  acetaminophen     Tablet .. 650 milliGRAM(s) Oral every 6 hours PRN  atorvastatin 40 milliGRAM(s) Oral at bedtime  benztropine 1 milliGRAM(s) Oral two times a day  chlorhexidine 2% Cloths 1 Application(s) Topical daily  dextrose 5%. 1000 milliLiter(s) IV Continuous <Continuous>  dextrose 5%. 1000 milliLiter(s) IV Continuous <Continuous>  dextrose 50% Injectable 25 Gram(s) IV Push once  dextrose 50% Injectable 12.5 Gram(s) IV Push once  dextrose 50% Injectable 25 Gram(s) IV Push once  dextrose Oral Gel 15 Gram(s) Oral once PRN  doxycycline monohydrate Capsule 100 milliGRAM(s) Oral every 12 hours  enoxaparin Injectable 40 milliGRAM(s) SubCutaneous every 24 hours  escitalopram 10 milliGRAM(s) Oral daily  glucagon  Injectable 1 milliGRAM(s) IntraMuscular once  haloperidol     Tablet 5 milliGRAM(s) Oral at bedtime  levothyroxine 50 MICROGram(s) Oral daily  melatonin 3 milliGRAM(s) Oral at bedtime PRN  mupirocin 2% Ointment 1 Application(s) Topical two times a day    Antimicrobials:   doxycycline monohydrate Capsule 100 milliGRAM(s) Oral every 12 hours    Immunologic:   
Patient is a 58y old  Male who presents with a chief complaint of labetalol overdose (08 Sep 2024 06:22)      HPI:  57 y/o M PMHx schizoaffective disorder (bipolar type, w/ multiple [facility] admissions), HTN, hypothyroidism, CVID/ hypogammaglobulinemia (on monthly IVIG, last on 7/23) COPD, hx of frequent skin infection (MRSA abscess / cellulitis w/ MRSA bacteremia s/p debridement 6/2023) who presented after overdosing on labetalol. Surgery consulted for MRSA scalp abscess. Biopsied by dermatology on 09/04/2024. Currently switched to vancomycin.           PAST MEDICAL & SURGICAL HISTORY:  Smoker      DM (diabetes mellitus)      HTN (hypertension)      Abscess of finger      Bipolar disorder      Chronic hyponatremia      CVID (common variable immunodeficiency)      Hyponatremia      Smoker      Deviated septum      Loss of teeth due to extraction  All teeth due to dental carries      Vital Signs Last 24 Hrs  T(C): 36.9 (08 Sep 2024 05:00), Max: 37.4 (07 Sep 2024 21:53)  T(F): 98.4 (08 Sep 2024 05:00), Max: 99.3 (07 Sep 2024 21:53)  HR: 62 (08 Sep 2024 05:00) (62 - 64)  BP: 131/76 (08 Sep 2024 05:00) (128/75 - 146/90)  BP(mean): --  RR: 17 (08 Sep 2024 05:00) (17 - 18)  SpO2: 99% (08 Sep 2024 05:00) (98% - 99%)    Parameters below as of 08 Sep 2024 05:00  Patient On (Oxygen Delivery Method): room air        PHYSICAL EXAM:  Constitutional: well developed, well nourished, NAD  Scalp: occipital abscess/ wound 2 x2 cm, open and draining pus. Mild surrounding erythema.    Respiratory: unlabored breathing.   Extremities: FROM, warm  Neurological: intact, non-focal    
CHIEF COMPLAINT:    HPI:   58 year old male schizoaffective disorder (bipolar type, w/ multiple [facility] admissions), HTN, HLD, hypothyroidism, CVID/ hypogammaglobulinemia (on monthly IVID) presenting for labetalol overdose. Per ED, around 7pm patient reports taking 20-40 200mg labetalol with SI. He is also prescribed escitalopram, amlodipine, levothyroxine, benztropine and one other unclear medication. He denies taking any other pills. Per sister he is more sleepy than usual. His vitals are normal with HR 72, /73. On exam he is reported as fatigued appearing but arouses to voice, normal level of orientation, pupils 3-4mm and reactive, normal mucous membranes moving all 4 extremities. No vomiting. Per sister she believes he hid away labetalol for this purpose as he was told not to take that and amlodipine anymore. No vomiting.   Pt reports he was nervous and took labetalol (20 pills). He states he has taken labetolol in the past requiring hospital admission (one month ago) w/ stay at [facility].     Denies chest pain, palpitations, dizziness, nausea, vomiting.     PAST MEDICAL & SURGICAL HISTORY:  Smoker      DM (diabetes mellitus)      HTN (hypertension)      Abscess of finger      Bipolar disorder      Chronic hyponatremia      CVID (common variable immunodeficiency)      Hyponatremia      Smoker      Deviated septum      Loss of teeth due to extraction  All teeth due to dental carries          FAMILY HISTORY:  Family history of throat cancer (Father)    Family history of leukemia (Mother)        SOCIAL HISTORY:    Allergies    Gammagard (Short breath; Rash)  penicillin (Rash)  amoxicillin (Fever)    Intolerances        HOME MEDICATIONS:    REVIEW OF SYSTEMS:  CONSTITUTIONAL: No weakness, fevers or chills  EYES/ENT: No visual changes;  No vertigo or throat pain   NECK: No pain or stiffness  RESPIRATORY: No cough, wheezing, hemoptysis; No shortness of breath  CARDIOVASCULAR: No chest pain or palpitations  GASTROINTESTINAL: No abdominal or epigastric pain. No nausea, vomiting, or hematemesis; No diarrhea or constipation. No melena or hematochezia.  GENITOURINARY: No dysuria, frequency or hematuria  NEUROLOGICAL: No numbness or weakness  SKIN: No itching, rashes      OBJECTIVE:  ICU Vital Signs Last 24 Hrs  T(C): 36.8 (02 Sep 2024 23:06), Max: 36.8 (02 Sep 2024 23:06)  T(F): 98.3 (02 Sep 2024 23:06), Max: 98.3 (02 Sep 2024 23:06)  HR: 69 (02 Sep 2024 23:06) (65 - 75)  BP: 101/74 (02 Sep 2024 23:06) (98/62 - 150/91)  BP(mean): 75 (02 Sep 2024 22:40) (75 - 75)  ABP: --  ABP(mean): --  RR: 18 (02 Sep 2024 23:06) (16 - 19)  SpO2: 100% (02 Sep 2024 23:06) (96% - 100%)    O2 Parameters below as of 02 Sep 2024 23:06  Patient On (Oxygen Delivery Method): room air        CAPILLARY BLOOD GLUCOSE      POCT Blood Glucose.: 152 mg/dL (02 Sep 2024 20:59)      PHYSICAL EXAM:  BP taken by bedside - 98/60, HR 60s  GENERAL: NAD, lying in bed comfortably  HEAD: Atraumatic, normocephalic  EYES:  conjunctiva and sclera clear  ENT: Moist mucous membranes  NECK: Supple, no JVD, no thyroidmegaly   HEART: S1, S2, Regular rate and rhythm  LUNGS: Unlabored respirations, clear to auscultation bilaterally, no crackles, wheezing, or rhonchi  ABDOMEN: Soft, nontender, nondistended  EXTREMITIES: 2+ peripheral pulses bilaterally. No clubbing, cyanosis, or edema  NERVOUS SYSTEM:  A&Ox3, no focal deficits   SKIN: No rashes or lesions    HOSPITAL MEDICATIONS:      MEDICATIONS  (STANDING):    MEDICATIONS  (PRN):      LABS:                        11.5   9.58  )-----------( 176      ( 02 Sep 2024 20:45 )             33.6     09-02    133<L>  |  99  |  12  ----------------------------<  137<H>  4.1   |  23  |  1.03    Ca    9.5      02 Sep 2024 20:45    TPro  7.4  /  Alb  4.3  /  TBili  <0.2  /  DBili  x   /  AST  16  /  ALT  14  /  AlkPhos  115  09-02      Urinalysis Basic - ( 02 Sep 2024 20:45 )    Color: x / Appearance: x / SG: x / pH: x  Gluc: 137 mg/dL / Ketone: x  / Bili: x / Urobili: x   Blood: x / Protein: x / Nitrite: x   Leuk Esterase: x / RBC: x / WBC x   Sq Epi: x / Non Sq Epi: x / Bacteria: x        Venous Blood Gas:  09-02 @ 20:45  7.33/51/35/27/69.1  VBG Lactate: 0.9    [X] EKG reviewed   
S:  Patient is a 58y Male who presents with a chief complaint of labetalol overdose (04 Sep 2024 12:21)    HPI:  59 y/o M with PMHx of schizoaffective disorder (bipolar type, w/ multiple [facility] admissions), HTN, HLD, hypothyroidism, CVID/ hypogammaglobulinemia (on monthly IVIG, last on 7/23, follows with Dr. Mitchell Boxer) COPD not on home O2, hx of frequent skin infection (MRSA abscess / cellulitis w/ MRSA bacteremia s/p debridement 6/2023) who presented after overdosing on labetalol. Patient states he was at the store earlier today when he heard kids yelling and "had paranoid delusions" about the police coming after him. He went home and ingested 20 labetalol pills with an intent to harm himself. He states he took his other home medications as prescribed and denies taking extra doses. He denies taking any other substances. He reported SOB, now resolved, but otherwise denied any other symptoms. He states he is scared that people are listening to him.     Of note, patient was last hospitalized in 7/2024 after a suicide attempt with labetalol, requiring pressors and insulin gtt with improvement. His hospital course was complicated by cellulitis of the L leg s/p a course of abx with continued wound care, TASH due to ATN vs AIN, now improved. He was transferred to [facility] on 7/22 for IVIG infusions, then transferred back to [facility] for psychiatric treatment. He was discharged home on 8/28.    ED Course:  Afebrile, /91, HR 75, RR 16, 99% on RA  Toxicology consulted and recommended charcoal, however patient was somnolent and became more hypotensive  Given glucagon, Ca gluconate, NS  MICU consulted and pt deemed not an ICU candidate  Admitted to Medicine for further management (03 Sep 2024 13:20)      DERM HPI  Consulted to evaluate open lesion on sore, noted by wound care and concerned for possible skin cancer. Per pt and family members, pt has hypogammaglobulinemia (gets IVIG) and history of recurrent MRSA abscesses and boils. Currently no active lesions on body aside from mildly tender sore on scalp. Pt believes it has been present for at least 1 mo, unsure if changing or growing. No history of skin cancer. Denies fever/chills. No similar lesions elsewhere. Not currently on abx. Per team, plan to transfer pt to [facility] once stable/cleared by from medical perspective.    PAST MEDICAL & SURGICAL HISTORY:  Smoker      DM (diabetes mellitus)      HTN (hypertension)      Abscess of finger      Bipolar disorder      Chronic hyponatremia      CVID (common variable immunodeficiency)      Hyponatremia      Smoker      Deviated septum      Loss of teeth due to extraction  All teeth due to dental carries          REVIEW OF SYSTEMS:  CONSTITUTIONAL: No weakness, fevers or chills  EYES/ENT: No visual changes.  No vertigo or throat pain   NECK: No pain or stiffness  RESPIRATORY: No cough, wheezing, hemoptysis. No shortness of breath  CARDIOVASCULAR: No chest pain or palpitations  GASTROINTESTINAL: No abdominal pain. No nausea, vomiting, or hematemesis. No diarrhea or constipation. No melena or hematochezia.  GENITOURINARY: No dysuria, increased frequency or hematuria  MSK: No joint pain, swelling, or stiffness  NEUROLOGICAL: No numbness or weakness  SKIN: as per HPI      MEDICATIONS  (STANDING):  atorvastatin 40 milliGRAM(s) Oral at bedtime  benztropine 1 milliGRAM(s) Oral at bedtime  chlorhexidine 2% Cloths 1 Application(s) Topical daily  dextrose 5%. 1000 milliLiter(s) (100 mL/Hr) IV Continuous <Continuous>  dextrose 5%. 1000 milliLiter(s) (50 mL/Hr) IV Continuous <Continuous>  dextrose 50% Injectable 25 Gram(s) IV Push once  dextrose 50% Injectable 12.5 Gram(s) IV Push once  dextrose 50% Injectable 25 Gram(s) IV Push once  enoxaparin Injectable 40 milliGRAM(s) SubCutaneous every 24 hours  escitalopram 10 milliGRAM(s) Oral daily  glucagon  Injectable 1 milliGRAM(s) IntraMuscular once  haloperidol     Tablet 5 milliGRAM(s) Oral at bedtime  levothyroxine 50 MICROGram(s) Oral daily  mupirocin 2% Ointment 1 Application(s) Topical two times a day    MEDICATIONS  (PRN):  acetaminophen     Tablet .. 650 milliGRAM(s) Oral every 6 hours PRN Temp greater or equal to 38C (100.4F), Mild Pain (1 - 3)  dextrose Oral Gel 15 Gram(s) Oral once PRN Blood Glucose LESS THAN 70 milliGRAM(s)/deciliter  melatonin 3 milliGRAM(s) Oral at bedtime PRN Insomnia      Allergies    Gammagard (Short breath; Rash)  penicillin (Rash)  amoxicillin (Fever)    Intolerances        Social History:  Patient lives alone, ambulatory, hx of smoking quit 1 month ago, 1/2 ppd for >20 years, no recent alcohol use.  From [facility]. Sisters Brittany and Susannah assist with medical decision making (Brittany is 1st proxy)    FAMILY HISTORY:  Family history of throat cancer (Father)    Family history of leukemia (Mother)        Vital Signs Last 24 Hrs  T(C): 36.7 (04 Sep 2024 14:00), Max: 36.7 (03 Sep 2024 22:48)  T(F): 98 (04 Sep 2024 14:00), Max: 98.1 (03 Sep 2024 22:48)  HR: 59 (04 Sep 2024 14:00) (59 - 68)  BP: 147/77 (04 Sep 2024 14:00) (134/64 - 158/71)  BP(mean): --  RR: 18 (04 Sep 2024 14:00) (18 - 18)  SpO2: 97% (04 Sep 2024 14:00) (96% - 100%)    Parameters below as of 04 Sep 2024 14:00  Patient On (Oxygen Delivery Method): room air        PHYSICAL EXAM:   The patient was alert and oriented and in no apparent distress.  There was no visible lymphadenopathy.  Conjunctiva were non-injected.  There was no clubbing or edema of extremities.  Skin: The scalp/hair, head/face, conjunctivae/lids, lips/teeth/gums, neck, digits/nails, right and left axilla, right and left upper and lower extremities, chest, abdomen, back, buttocks and groin area. No bromhidrosis or hyperhidrosis.      Of note on skin exam:   ulcerated firm nodule on occipital scalp with areas of overlying hemorrhagic crust    LABS:                        11.2   6.66  )-----------( 164      ( 04 Sep 2024 04:55 )             33.6     CBC Full  -  ( 04 Sep 2024 04:55 )  WBC Count : 6.66 K/uL  RBC Count : 4.01 M/uL  Hemoglobin : 11.2 g/dL  Hematocrit : 33.6 %  Platelet Count - Automated : 164 K/uL  Mean Cell Volume : 83.8 fL  Mean Cell Hemoglobin : 27.9 pg  Mean Cell Hemoglobin Concentration : 33.3 gm/dL  Auto Neutrophil # : 5.12 K/uL  Auto Lymphocyte # : 0.75 K/uL  Auto Monocyte # : 0.61 K/uL  Auto Eosinophil # : 0.14 K/uL  Auto Basophil # : 0.02 K/uL  Auto Neutrophil % : 76.8 %  Auto Lymphocyte % : 11.3 %  Auto Monocyte % : 9.2 %  Auto Eosinophil % : 2.1 %  Auto Basophil % : 0.3 %    09-04    136  |  100  |  7   ----------------------------<  97  3.7   |  23  |  0.96    Ca    9.2      04 Sep 2024 04:55  Phos  3.4     09-04  Mg     2.10     09-04    TPro  7.4  /  Alb  4.3  /  TBili  <0.2  /  DBili  x   /  AST  16  /  ALT  14  /  AlkPhos  115  09-02

## 2024-09-09 ENCOUNTER — NON-APPOINTMENT (OUTPATIENT)
Age: 59
End: 2024-09-09

## 2024-09-09 LAB
CULTURE RESULTS: ABNORMAL
ORGANISM # SPEC MICROSCOPIC CNT: ABNORMAL
ORGANISM # SPEC MICROSCOPIC CNT: ABNORMAL
[facility] SOURCE: SIGNIFICANT CHANGE UP
VANCOMYCIN TROUGH SERPL-MCNC: 12.5 UG/ML — SIGNIFICANT CHANGE UP (ref 10–20)

## 2024-09-09 PROCEDURE — 99232 SBSQ HOSP IP/OBS MODERATE 35: CPT

## 2024-09-09 RX ORDER — POLYETHYLENE GLYCOL 3350 17 G/17G
17 POWDER, FOR SOLUTION ORAL DAILY
Refills: 0 | Status: DISCONTINUED | OUTPATIENT
Start: 2024-09-09 | End: 2024-09-11

## 2024-09-09 RX ORDER — IMMUN GLOB G(IGG)/GLY/IGA OV50 10 %
70 VIAL (ML) INTRAVENOUS ONCE
Refills: 0 | Status: COMPLETED | OUTPATIENT
Start: 2024-09-09 | End: 2024-09-09

## 2024-09-09 RX ORDER — IMMUN GLOB G(IGG)/GLY/IGA OV50 10 %
70 VIAL (ML) INTRAVENOUS ONCE
Refills: 0 | Status: DISCONTINUED | OUTPATIENT
Start: 2024-09-09 | End: 2024-09-09

## 2024-09-09 RX ADMIN — AMLODIPINE BESYLATE 10 MILLIGRAM(S): 10 TABLET ORAL at 05:12

## 2024-09-09 RX ADMIN — Medication 5 MILLIGRAM(S): at 22:06

## 2024-09-09 RX ADMIN — Medication 300 MILLIGRAM(S): at 05:44

## 2024-09-09 RX ADMIN — Medication 1 APPLICATION(S): at 17:07

## 2024-09-09 RX ADMIN — Medication 300 MILLIGRAM(S): at 17:57

## 2024-09-09 RX ADMIN — ESCITALOPRAM OXALATE 10 MILLIGRAM(S): 10 TABLET ORAL at 11:07

## 2024-09-09 RX ADMIN — CHLORHEXIDINE GLUCONATE 1 APPLICATION(S): 40 SOLUTION TOPICAL at 11:07

## 2024-09-09 RX ADMIN — ENOXAPARIN SODIUM 40 MILLIGRAM(S): 100 INJECTION SUBCUTANEOUS at 05:44

## 2024-09-09 RX ADMIN — Medication 233.33 GRAM(S): at 21:16

## 2024-09-09 RX ADMIN — BENZTROPINE MESYLATE 1 MILLIGRAM(S): 2 TABLET ORAL at 17:07

## 2024-09-09 RX ADMIN — Medication 40 MILLIGRAM(S): at 22:07

## 2024-09-09 RX ADMIN — Medication 1 APPLICATION(S): at 05:12

## 2024-09-09 RX ADMIN — BENZTROPINE MESYLATE 1 MILLIGRAM(S): 2 TABLET ORAL at 05:12

## 2024-09-09 RX ADMIN — Medication 50 MICROGRAM(S): at 05:12

## 2024-09-09 NOTE — PROGRESS NOTE ADULT - ASSESSMENT
59 y/o M PMHx schizoaffective disorder (bipolar type, w/ multiple [facility] admissions), HTN, hypothyroidism, CVID/ hypogammaglobulinemia (on monthly IVIG, last on 7/23) COPD, hx of frequent skin infection (MRSA abscess / cellulitis w/ MRSA bacteremia s/p debridement 6/2023) who presented after overdosing on labetalol. Surgery consulted for MRSA scalp abscess.     PLAN:   - No acute surgical intervention  - Patient afebrile, normal WBC, Reports symptomatic improvement, no longer painful.   - Wound currently draining: washed out 9/8, debrided and new dressing placed at bedside by the surgical team.   - Please change dressing daily and PRN, continued drainage expected.   - Continue Antibiotics per ID, currently on Vancomycin (switched form clindamycin today).   - If patient develops develops fevers, worsening pain or elevated WBC recommend obtaining imaging      B Team surgery  [id number]   59 y/o M PMHx schizoaffective disorder (bipolar type, w/ multiple [facility] admissions), HTN, hypothyroidism, CVID/ hypogammaglobulinemia (on monthly IVIG, last on 7/23) COPD, hx of frequent skin infection (MRSA abscess / cellulitis w/ MRSA bacteremia s/p debridement 6/2023) who presented after overdosing on labetalol. Surgery consulted for MRSA scalp abscess.     PLAN:   - No acute surgical intervention  - Patient afebrile, normal WBC, Reports symptomatic improvement, no longer painful.   - Wound currently draining: washed out 9/8, debrided and new dressing placed at bedside by the surgical team.   - Continue local wound care daily and PRN, continued drainage expected.   - Continue Antibiotics per ID, currently on Vancomycin (switched form clindamycin today).   - Surgery to sign off at this time, please contact if needed     B Team surgery  [id number]

## 2024-09-09 NOTE — PROGRESS NOTE ADULT - ASSESSMENT
57 y/o M PMHx schizoaffective disorder (bipolar type, w/ multiple [facility] admissions), HTN, hypothyroidism, CVID/ hypogammaglobulinemia (on monthly IVIG, last on 7/23) COPD, hx of frequent skin infection (MRSA abscess / cellulitis w/ MRSA bacteremia s/p debridement 6/2023) who presented after overdosing on labetalol    MRSA scalp abscess, axilla furuncle  afebrile, no leukocytosis  scalp furuncle w/ purulent drainage  wound cx w/ MRSA  - s/p I&D 9/8  scalp wound without tenderness, no drainage seen today  R axilla furuncle no longer tender    Recommendations  c/w vancomycin 1.5g bid for now  - goal trough 15-20, trough prior to 4th dose (this afternoon's dose)  likely plan to transition to PO clindamycin 450mg PO every 8 hours tomorrow    Milton Christine M.D.  [facility], Division of Infectious Diseases  [phone]  After 5pm on weekdays and all day on weekends - please call [phone]

## 2024-09-09 NOTE — PROGRESS NOTE ADULT - ASSESSMENT
59 y/o M with PMHx of schizoaffective disorder (bipolar type, w/ multiple [facility] admissions), HTN, HLD, hypothyroidism, CVID/ hypogammaglobulinemia (on monthly IVIG, last on 7/23, follows with Dr. Mitchell Boxer) COPD not on home O2, hx of frequent skin infection (MRSA abscess / cellulitis w/ MRSA bacteremia s/p debridement 6/2023) who presented after labetalol overdose.

## 2024-09-09 NOTE — BH CONSULTATION LIAISON PROGRESS NOTE - NSBHFUPINTERVALHXFT_PSY_A_CORE
Pt seen at bedside, states he is feeling "fine." Reports good appetitive and that he slept 8 hours last night but was interrupted by having his vital signs taken, denies nightmares or distressing thought content. States he looks forward to being visited by his sister and .    Of note, pt had MRSA scalp wound washout and debridement 9/8, states that he         Denies current AVH/SI/HI or paranoia, describes feeling safe.     Pt seen at bedside, states he is feeling "fine." Reports good appetitive and that he slept 8 hours last night but was interrupted by having his vital signs taken, denies nightmares or distressing thought content. States he looks forward to being visited by his sister and .    Of note, pt had MRSA scalp wound washout and debridement 9/8, states that he feels pain has improved       Denies current AVH/SI/HI or paranoia, describes feeling safe.

## 2024-09-09 NOTE — BH CONSULTATION LIAISON PROGRESS NOTE - NSBHCHARTREVIEWVS_PSY_A_CORE FT
Vital Signs Last 24 Hrs  T(C): 36.3 (09 Sep 2024 05:00), Max: 36.6 (08 Sep 2024 22:00)  T(F): 97.4 (09 Sep 2024 05:00), Max: 97.9 (08 Sep 2024 22:00)  HR: 50 (09 Sep 2024 05:00) (50 - 59)  BP: 125/78 (09 Sep 2024 05:00) (125/78 - 133/79)  BP(mean): 92 (09 Sep 2024 05:00) (92 - 101)  RR: 18 (09 Sep 2024 05:00) (17 - 18)  SpO2: 99% (09 Sep 2024 05:00) (96% - 99%)    O2 Parameters below as of 09 Sep 2024 05:00  Patient On (Oxygen Delivery Method): room air

## 2024-09-09 NOTE — BH CONSULTATION LIAISON PROGRESS NOTE - NSBHASSESSMENTFT_PSY_ALL_CORE
59 yo male w PMHx of schizoaffective disorder (bipolar type with multiple [facility] admissions), HTN, HLD, hypothyroidism, CVID/hypogammaglobulinemia (monthly IVIG, last on 7/23), hx of frequent skin infection (MRSA, abscess/cellulitis w MRSA bacteremia s/p debridement 6/2023) presented to the ED with sisters after appearing unwell admitted for concern for suicide attempt. Psych c/s for SI.     Initial eval: Patient exhibits psychosis and depression, marked features of schizoaffective d/o actively present. Patient currently suicidal and unable to safety plan, will require further optimization of psychiatric medications and psychiatric hospitalization for safety/stabilization.    9/4: Pt reports improved mood, describing it as "excellent". He denies current AVH/SI/HI but admits that his "paranoid delusions" are triggered easily by "guilty" thoughts. Pt will require further assessment and psychiatric hospitalization for safety/stabilization given concern for SI/suicide attempt, no EPS/sfx noted on haldol.   9/5: Pt continues to describe good mood. Denies AVH/SI/HI. Awaiting medical clearance for admission to [facility].  9/6: Pt A&O, calm and cooperative. Pt endorses good mood but admits that his paranoid thoughts and SI are triggered by external stimuli that he cannot predict. Denies current SI/HI/AVH. Denies suicidality/intent or plan and denies homicidality/intent or plan but admits to OD on meds.   9/9: Pt endorses good mood, denies AVH/SI/HI. Appreciate MRSA scalp abscess wound washout 9/8. Awaiting medical clearance for admission to [facility].     Plans:  Continue 1:1 and safety precautions for suicidality  -C/w Haldol 5mg qHS PO  -C/w cogentin to 1 mg BID PO  -C/w lexapro 10 mg daily PO  -PRN for agitation Haldol 5mg + Ativan mg q6h PRN PO/IM/IV  -Dispo, inpt psych once medically cleared    57 yo male w PMHx of schizoaffective disorder (bipolar type with multiple [facility] admissions), HTN, HLD, hypothyroidism, CVID/hypogammaglobulinemia (monthly IVIG, last on 7/23), hx of frequent skin infection (MRSA, abscess/cellulitis w MRSA bacteremia s/p debridement 6/2023) presented to the ED with sisters after appearing unwell admitted for concern for suicide attempt. Psych c/s for SI.     Initial eval: Patient exhibits psychosis and depression, marked features of schizoaffective d/o actively present. Patient currently suicidal and unable to safety plan, will require further optimization of psychiatric medications and psychiatric hospitalization for safety/stabilization.    9/4: Pt reports improved mood, describing it as "excellent". He denies current AVH/SI/HI but admits that his "paranoid delusions" are triggered easily by "guilty" thoughts. Pt will require further assessment and psychiatric hospitalization for safety/stabilization given concern for SI/suicide attempt, no EPS/sfx noted on haldol.   9/5: Pt continues to describe good mood. Denies AVH/SI/HI. Awaiting medical clearance for admission to [facility].  9/6: Pt A&O, calm and cooperative. Pt endorses good mood but admits that his paranoid thoughts and SI are triggered by external stimuli that he cannot predict. Denies current SI/HI/AVH. Denies suicidality/intent or plan and denies homicidality/intent or plan but admits to OD on meds.   9/9: Pt endorses good mood, denies AVH/SI/HI. Appreciate MRSA scalp abscess wound washout 9/8. Awaiting medical clearance for admission to [facility].     Plans:  Continue 1:1 and safety precautions for suicidality  - May need IVIG (would coordinate with sister about this) prior to [facility] transfer as he is due for it on 9/12  -C/w Haldol 5mg qHS PO  -C/w cogentin to 1 mg BID PO  -C/w lexapro 10 mg daily PO  -PRN for agitation Haldol 5mg + Ativan mg q6h PRN PO/IM/IV  -Dispo, inpt psych once medically cleared

## 2024-09-10 LAB — SARS-COV-2 RNA SPEC QL NAA+PROBE: SIGNIFICANT CHANGE UP

## 2024-09-10 PROCEDURE — 99232 SBSQ HOSP IP/OBS MODERATE 35: CPT

## 2024-09-10 RX ORDER — CLINDAMYCIN PHOSPHATE 150 MG/ML
450 VIAL (ML) INJECTION EVERY 8 HOURS
Refills: 0 | Status: DISCONTINUED | OUTPATIENT
Start: 2024-09-10 | End: 2024-09-11

## 2024-09-10 RX ADMIN — Medication 40 MILLIGRAM(S): at 21:39

## 2024-09-10 RX ADMIN — Medication 50 MICROGRAM(S): at 05:22

## 2024-09-10 RX ADMIN — CHLORHEXIDINE GLUCONATE 1 APPLICATION(S): 40 SOLUTION TOPICAL at 17:12

## 2024-09-10 RX ADMIN — Medication 1 APPLICATION(S): at 17:13

## 2024-09-10 RX ADMIN — Medication 300 MILLIGRAM(S): at 05:28

## 2024-09-10 RX ADMIN — BENZTROPINE MESYLATE 1 MILLIGRAM(S): 2 TABLET ORAL at 17:13

## 2024-09-10 RX ADMIN — AMLODIPINE BESYLATE 10 MILLIGRAM(S): 10 TABLET ORAL at 05:23

## 2024-09-10 RX ADMIN — Medication 5 MILLIGRAM(S): at 21:39

## 2024-09-10 RX ADMIN — BENZTROPINE MESYLATE 1 MILLIGRAM(S): 2 TABLET ORAL at 05:22

## 2024-09-10 RX ADMIN — Medication 1 APPLICATION(S): at 05:22

## 2024-09-10 RX ADMIN — ESCITALOPRAM OXALATE 10 MILLIGRAM(S): 10 TABLET ORAL at 17:13

## 2024-09-10 RX ADMIN — ENOXAPARIN SODIUM 40 MILLIGRAM(S): 100 INJECTION SUBCUTANEOUS at 06:42

## 2024-09-10 RX ADMIN — Medication 450 MILLIGRAM(S): at 21:39

## 2024-09-10 RX ADMIN — Medication 450 MILLIGRAM(S): at 13:50

## 2024-09-10 NOTE — PROVIDER CONTACT NOTE (OTHER) - ACTION/TREATMENT ORDERED:
ACP Fanny Dave notified. Care continues.
ACP Fanny Dave aware. IVIG returned to pharmacy so it could be stored safely. As per ACP, will reassess tomorrow to be given.
IVIG infusion ongoing. Care continues.
ALISHA Dave aware. Labetolol held for parameters.

## 2024-09-10 NOTE — PROGRESS NOTE ADULT - ASSESSMENT
57 y/o M with PMHx of schizoaffective disorder (bipolar type, w/ multiple [facility] admissions), HTN, HLD, hypothyroidism, CVID/ hypogammaglobulinemia (on monthly IVIG, last on 7/23, follows with Dr. Mitchell Boxer) COPD not on home O2, hx of frequent skin infection (MRSA abscess / cellulitis w/ MRSA bacteremia s/p debridement 6/2023) who presented after labetalol overdose.

## 2024-09-10 NOTE — DIETITIAN INITIAL EVALUATION ADULT - NSFNSPHYEXAMSKINFT_GEN_A_CORE
As per wound care note 9/4/24; Left lateral LE: Chronic wound 2/2 MRSA abscess and Occipital Scalp: Furunculosis vs Malignancy vs MRSA abscess.

## 2024-09-10 NOTE — DIETITIAN INITIAL EVALUATION ADULT - REASON FOR ADMISSION
Per chart, Pt is 59 y/o M with PMHx of schizoaffective disorder (bipolar type, w/ multiple [facility] admissions), HTN, HLD, hypothyroidism, CVID/ hypogammaglobulinemia (on monthly IVIG, last on 7/23, follows with Dr. Mitchell Boxer) COPD not on home O2, hx of frequent skin infection (MRSA abscess / cellulitis w/ MRSA bacteremia s/p debridement 6/2023) who presented after labetalol overdose

## 2024-09-10 NOTE — PROGRESS NOTE ADULT - PROBLEM SELECTOR PROBLEM 4
CVID (common variable immunodeficiency)
CVID (common variable immunodeficiency)
Scalp abscess
Scalp abscess
CVID (common variable immunodeficiency)
Scalp abscess
Scalp abscess

## 2024-09-10 NOTE — PROGRESS NOTE ADULT - PROBLEM SELECTOR PLAN 7
-continue atorvastatin        stable hemodynamically for [facility] transfer.
-continue atorvastatin

## 2024-09-10 NOTE — PROGRESS NOTE ADULT - PROBLEM SELECTOR PROBLEM 6
Hypothyroidism
Hypothyroidism
Hyperlipidemia
Hypothyroidism
Hyperlipidemia
Hypothyroidism
Hyperlipidemia

## 2024-09-10 NOTE — PROVIDER CONTACT NOTE (OTHER) - DATE AND TIME:
09-Sep-2024 18:00
10-Sep-2024 15:20
09-Sep-2024 18:30
09-Sep-2024 21:52
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

## 2024-09-10 NOTE — BH CONSULTATION LIAISON PROGRESS NOTE - NSBHCHARTREVIEWVS_PSY_A_CORE FT
Vital Signs Last 24 Hrs  T(C): 36.5 (10 Sep 2024 06:34), Max: 36.8 (10 Sep 2024 02:05)  T(F): 97.7 (10 Sep 2024 06:34), Max: 98.2 (10 Sep 2024 02:05)  HR: 52 (10 Sep 2024 06:34) (50 - 64)  BP: 153/80 (10 Sep 2024 06:34) (123/82 - 153/80)  BP(mean): --  ABP: --  ABP(mean): --  RR: 18 (10 Sep 2024 06:34) (17 - 18)  SpO2: 97% (10 Sep 2024 06:34) (97% - 100%)    O2 Parameters below as of 10 Sep 2024 06:34  Patient On (Oxygen Delivery Method): room air

## 2024-09-10 NOTE — PROVIDER CONTACT NOTE (OTHER) - SITUATION
Pt HR <60 consistently while awake. Pt has order to notify provider for HR <60
Unable to give IVIG
Occipital tissue culture taken from 9/4 positive of MRSA
Heart rate 56

## 2024-09-10 NOTE — PROGRESS NOTE ADULT - PROBLEM SELECTOR PLAN 3
-Hold home metformin 500mg BID  -Add ISS as needed  -Hypoglycemia protocol
-Hold home metformin 500mg BID  -Add ISS as needed  -Hypoglycemia protocol
-hold home metformin 500mg BID  -add ISS as needed  -hypoglycemia protocol  -can resume on discharge.

## 2024-09-10 NOTE — DIETITIAN INITIAL EVALUATION ADULT - PERTINENT MEDS FT
MEDICATIONS  (STANDING):  amLODIPine   Tablet 10 milliGRAM(s) Oral daily  atorvastatin 40 milliGRAM(s) Oral at bedtime  benztropine 1 milliGRAM(s) Oral two times a day  chlorhexidine 2% Cloths 1 Application(s) Topical daily  clindamycin   Capsule 450 milliGRAM(s) Oral every 8 hours  dextrose 5%. 1000 milliLiter(s) (50 mL/Hr) IV Continuous <Continuous>  dextrose 5%. 1000 milliLiter(s) (100 mL/Hr) IV Continuous <Continuous>  dextrose 50% Injectable 25 Gram(s) IV Push once  dextrose 50% Injectable 12.5 Gram(s) IV Push once  dextrose 50% Injectable 25 Gram(s) IV Push once  enoxaparin Injectable 40 milliGRAM(s) SubCutaneous every 24 hours  escitalopram 10 milliGRAM(s) Oral daily  glucagon  Injectable 1 milliGRAM(s) IntraMuscular once  haloperidol     Tablet 5 milliGRAM(s) Oral at bedtime  labetalol 200 milliGRAM(s) Oral two times a day  levothyroxine 50 MICROGram(s) Oral daily  mupirocin 2% Ointment 1 Application(s) Topical two times a day    MEDICATIONS  (PRN):  acetaminophen     Tablet .. 650 milliGRAM(s) Oral every 6 hours PRN Temp greater or equal to 38C (100.4F), Mild Pain (1 - 3)  dextrose Oral Gel 15 Gram(s) Oral once PRN Blood Glucose LESS THAN 70 milliGRAM(s)/deciliter  melatonin 3 milliGRAM(s) Oral at bedtime PRN Insomnia  polyethylene glycol 3350 17 Gram(s) Oral daily PRN Constipation

## 2024-09-10 NOTE — PROGRESS NOTE ADULT - ASSESSMENT
59 y/o M PMHx schizoaffective disorder (bipolar type, w/ multiple [facility] admissions), HTN, hypothyroidism, CVID/ hypogammaglobulinemia (on monthly IVIG, last on 7/23) COPD, hx of frequent skin infection (MRSA abscess / cellulitis w/ MRSA bacteremia s/p debridement 6/2023) who presented after overdosing on labetalol    MRSA scalp abscess, axilla furuncle  afebrile, no leukocytosis  scalp furuncle w/ purulent drainage  wound cx w/ MRSA  - s/p I&D 9/8  scalp wound without tenderness, no drainage seen today  R axilla furuncle no longer tender    Recommendations  switch vanc to clindamycin 450mg PO every 8 hours x 5 days  no objection to discharge from ID perspective    Milton Christine M.D.  [facility], Division of Infectious Diseases  [phone]  After 5pm on weekdays and all day on weekends - please call [phone]

## 2024-09-10 NOTE — PROGRESS NOTE ADULT - PROBLEM SELECTOR PLAN 2
-initially holding home labetalol and amlodipine in setting of above  -can resume since he is stable hemodynamics
-Hold home labetalol and amlodipine in setting of above
-Hold home labetalol and amlodipine in setting of above
-initially holding home labetalol and amlodipine in setting of above  -can resume since he is stable hemodynamics
-initially held home labetalol and amlodipine in setting of above  -resumed since he is stable hemodynamics

## 2024-09-10 NOTE — DIETITIAN INITIAL EVALUATION ADULT - ADD RECOMMEND
- Continue current diet order, which remains appropriate at this time.   - Please consistently document % PO intake in nursing flowsheets to assess adequacy of nutritional intake/monitor need for further nutritional intervention(s).   - Monitor weights, diet tolerance, skin integrity, BMs, pertinent labs.   - RDN services remain available as needed.

## 2024-09-10 NOTE — DIETITIAN INITIAL EVALUATION ADULT - PERTINENT LABORATORY DATA
POCT Blood Glucose.: 115 mg/dL (09-10-24 @ 07:37)  A1C with Estimated Average Glucose Result: 5.8 % (07-24-24 @ 07:48)  A1C with Estimated Average Glucose Result: 5.7 % (05-26-24 @ 06:00)  A1C with Estimated Average Glucose Result: 5.6 % (03-09-24 @ 09:43)

## 2024-09-10 NOTE — BH CONSULTATION LIAISON PROGRESS NOTE - NSBHFUPINTERVALHXFT_PSY_A_CORE
Pt seen at bedside, alert and oriented, calm and cooperative. Reports good mood and ok sleep, 6 hours. Of note, pt received IVIG treatment yesterday, states he is eager to move to [facility] for evaluation. No questions or concerns.        Denies current AVH/SI/HI or paranoia, feels safe.

## 2024-09-10 NOTE — PROGRESS NOTE ADULT - PROBLEM SELECTOR PLAN 4
-Diagnosed at 20 y/o, follows with Dr. Mitchell Boxer  -Receives monthly IVIG, last dose 8/15
-open small boil back of the head  -bilateral arm pit, small 1 cm funicle. wound care. close monitoring.   -derm consulted. s/p biopsy 9/4/24. path pending.  -clindamycin switched to vancomycin on 9/8/24 due to increased purulent drainage; vanco trough ordered for evening of 9/9; likely switch to clindamycin 9/10/24  -s/p washout and debridement by surgery 9/8/24, appreciate intervention  -appreciate ID
-open small boil back of the head  -bilateral arm pit, small 1 cm funicle. wound care. close monitoring.   -derm consulted. s/p biopsy 9/4/24. path pending.  -clindamycin switched to vancomycin on 9/8/24 due to increased purulent drainage; vanco trough ordered for evening of 9/9; switched to clindamycin 9/10/24 x 5 days  -s/p washout and debridement by surgery 9/8/24, appreciate intervention  -appreciate ID
-open small boil back of the head  -bilateral arm pit, small 1 cm funicle. wound care. close monitoring.   -derm consulted. s/p biopsy. path pending.  -clindamycin x 7 days as per ID to cover for MRSA
-Diagnosed at 18 y/o, follows with Dr. Mitchell Boxer  -Receives monthly IVIG, last dose 8/15
-Diagnosed at 20 y/o, follows with Dr. Mitchell Boxer  -Receives monthly IVIG, last dose 8/15, next dose approximately around 9/12 (typically Gammagard S/D formula)
-open small boil back of the head  -bilateral arm pit, small 1 cm funicle. wound care. close monitoring.   -derm consulted. s/p biopsy 9/4/24. path pending.  -clindamycin switched to vancomycin on 9/8/24 due to increased purulent drainage  -s/p washout and debridement by surgery 9/8/24, appreciate intervention  -appreciate ID

## 2024-09-10 NOTE — PROGRESS NOTE ADULT - PROBLEM SELECTOR PLAN 6
-Continue atorvastatin
-Continue atorvastatin    # Open small boil back of the head  no bleeding. no pus.   derm consulted. s/p biopsy. path pending. Staph growing. f/u sensitivities.  Doxycyline BID per ID.   bilateral arm pit, small 1 cm funicle. wound care. close monitoring.     stable hemodynamically for H transfer.  can follow up cx sensitivities outpt. ID follow up.
-continue levothyroxine 50mcg qdaily
-continue levothyroxine 50mcg qdaily
-Continue atorvastatin
-continue levothyroxine 50mcg qd
-continue levothyroxine 50mcg qdaily

## 2024-09-10 NOTE — DIETITIAN INITIAL EVALUATION ADULT - OTHER INFO
Patient seen at bedside with PCA present. Pt reported good intake in-house. Per RN flowsheet, Pt with good PO intake % noted. No food preferences verbalized at this time. No GI distress reported i.e. nausea, vomiting, diarrhea at present. Per Pt, last BM 9/9. Ordered for bowel regimen: Miralax 17Grams daily PRN. No chewing or swallowing difficulties at this time per Pt. Of note, Pt is edentulous. No edema noted per RN flowsheet. As per wound care note 9/4/24; Left lateral LE: Chronic wound 2/2 MRSA abscess and Occipital Scalp: Furunculosis vs Malignancy vs MRSA abscess. Unable to access [facility] at this time. Pt educated on Consistent Carbohydrate diet and understanding.

## 2024-09-10 NOTE — PROGRESS NOTE ADULT - PROBLEM SELECTOR PLAN 5
-diagnosed at 18 y/o, follows with Dr. Mitchell Boxer  -receives monthly IVIG (typically Gammagard S/D formula), last dose 8/15, next dose given 9/9/24 without incident
-diagnosed at 20 y/o, follows with Dr. Mitchell Boxer  -receives monthly IVIG, last dose 8/15, next dose approximately around 9/12 (typically Gammagard S/D formula)
-diagnosed at 18 y/o, follows with Dr. Mitchell Boxer  -receives monthly IVIG, last dose 8/15, next dose ordered (typically Gammagard S/D formula)
-diagnosed at 20 y/o, follows with Dr. Mitchell Boxer  -receives monthly IVIG, last dose 8/15, next dose approximately around 9/12 (typically Gammagard S/D formula)
-Continue levothyroxine 50mcg qd

## 2024-09-10 NOTE — DIETITIAN INITIAL EVALUATION ADULT - ORAL INTAKE PTA/DIET HISTORY
Patient reports excellent appetite/PO intake PTA, consumes a carbohydrate control diet at baseline. Pt confirms NKFA, food intolerance. Pt reported UBW 246lbs/111.6kg. Pt reported weight loss of 20lbs in the past two months. Per previus RD note (7/23/24), .1kg (7/9/24). Based on previous weight and current admission wt, reported weight loss is inaccurate. Wt decreased 4.5kg, x2 months (3.8%). No clinical significant.

## 2024-09-10 NOTE — PROGRESS NOTE ADULT - PROBLEM SELECTOR PROBLEM 5
CVID (common variable immunodeficiency)
CVID (common variable immunodeficiency)
Hypothyroidism
Hypothyroidism
CVID (common variable immunodeficiency)
CVID (common variable immunodeficiency)
Hypothyroidism

## 2024-09-10 NOTE — PROGRESS NOTE ADULT - PROBLEM SELECTOR PROBLEM 1
Suicide attempt by beta blocker overdose

## 2024-09-10 NOTE — PROGRESS NOTE ADULT - PROBLEM SELECTOR PLAN 1
-Patient presented to the hospital after intentional ingestion of 20 labetalol pills in setting of paranoid delusions. Hx of prior suicide attempts with the same, previously requiring ICU level care.  -Tox screen negative. Vitals initially stable, however became more hypotensive and somnolent. He was given Ca gluconate, NS and glucagon in the ED. Evaluated by MICU team and was hemodynamically stable, deemed not an ICU candidate  -psyc eval appreciated. resume Haldol, cogentin, lexapro 10 mg   -Trend BP, HR, and monitor for mental status changes  -Continue 1:1 and safety precautions  -Monitor on Telemetry
-Patient presented to the hospital after intentional ingestion of 20 labetalol pills in setting of paranoid delusions. Hx of prior suicide attempts with the same, previously requiring ICU level care.  -Tox screen negative. Vitals initially stable, however became more hypotensive and somnolent. He was given Ca gluconate, NS and glucagon in the ED. Evaluated by MICU team and was hemodynamically stable, deemed not an ICU candidate. currently stable hemodynamics.   -psyc eval appreciated. resume Haldol, cogentin, lexapro 10 mg   -Trend BP, HR, and monitor for mental status changes  -Continue 1:1 and safety precautions  -Monitor on Telemetry, now mental status back to baseline.
-Patient presented to the hospital after intentional ingestion of 20 labetalol pills in setting of paranoid delusions. Hx of prior suicide attempts with the same, previously requiring ICU level care.  -Tox screen negative. Vitals initially stable, however became more hypotensive and somnolent. He was given Ca gluconate, NS and glucagon in the ED. Evaluated by MICU team and was hemodynamically stable, deemed not an ICU candidate  -psyc eval appreciated. resume Haldol, cogentin, lexapro 10 mg   -Trend BP, HR, and monitor for mental status changes  -Continue 1:1 and safety precautions  -Monitor on Telemetry
-patient presented to the hospital after intentional ingestion of 20 labetalol pills in setting of paranoid delusions. Hx of prior suicide attempts with the same, previously requiring ICU level care.  -tox screen negative. Vitals initially stable, however became more hypotensive and somnolent. He was given Ca gluconate, NS and glucagon in the ED. Evaluated by MICU team and was hemodynamically stable, deemed not an ICU candidate. currently stable hemodynamics.   -cont haldol, cogentin, lexapro 10 mg   -monitor for mental status changes  -continue 1:1 and safety precautions  -medically cleared for [facility]  -appreciate behavioral health
-patient presented to the hospital after intentional ingestion of 20 labetalol pills in setting of paranoid delusions. Hx of prior suicide attempts with the same, previously requiring ICU level care.  -tox screen negative. Vitals initially stable, however became more hypotensive and somnolent. He was given Ca gluconate, NS and glucagon in the ED. Evaluated by MICU team and was hemodynamically stable, deemed not an ICU candidate. currently stable hemodynamics.   -cont haldol, cogentin, lexapro 10 mg   -monitor for mental status changes  -ontinue 1:1 and safety precautions  -appreciate behavioral health

## 2024-09-10 NOTE — PROVIDER CONTACT NOTE (OTHER) - ASSESSMENT
Heart rate 56, all other vitals stable. Patient denies pain or distress.
Pt in resting bed with 1:1 at bedside.
IVIG ordered by provider. Patient received monthly doses with last dose being 8/15/24. IVIG reconstituted into bag by pharmacy and picked up by RN. IVIG sent with filter tubing that is not compatible with IV pumps needed to administer medication safely. 8N nurses/chemo nurses/ nursing education on unit to try to find solution and pharmacy supervisor consulted but no solution found.
Pt asymptomatic. VSS and documented in flowsheet. IVIG started and pt tolerating well

## 2024-09-10 NOTE — PROVIDER CONTACT NOTE (OTHER) - RECOMMENDATIONS
ALISHA Dave aware. Labetolol held for parameters.
ALISHA Paredes notified
ACP Fanny Dave aware. IVIG returned to pharmacy so it could be stored safely. As per ACP, will reassess tomorrow to be given.
ALISHA Dave notified.

## 2024-09-10 NOTE — BH CONSULTATION LIAISON PROGRESS NOTE - NSBHASSESSMENTFT_PSY_ALL_CORE
57 yo male w PMHx of schizoaffective disorder (bipolar type with multiple [facility] admissions), HTN, HLD, hypothyroidism, CVID/hypogammaglobulinemia (monthly IVIG, last on 7/23), hx of frequent skin infection (MRSA, abscess/cellulitis w MRSA bacteremia s/p debridement 6/2023) presented to the ED with sisters after appearing unwell admitted for concern for suicide attempt. Psych c/s for SI.     Initial eval: Patient exhibits psychosis and depression, marked features of schizoaffective d/o actively present. Patient currently suicidal and unable to safety plan, will require further optimization of psychiatric medications and psychiatric hospitalization for safety/stabilization.    9/4: Pt reports improved mood, describing it as "excellent". He denies current AVH/SI/HI but admits that his "paranoid delusions" are triggered easily by "guilty" thoughts. Pt will require further assessment and psychiatric hospitalization for safety/stabilization given concern for SI/suicide attempt, no EPS/sfx noted on haldol.   9/5: Pt continues to describe good mood. Denies AVH/SI/HI. Awaiting medical clearance for admission to [facility].  9/6: Pt A&O, calm and cooperative. Pt endorses good mood but admits that his paranoid thoughts and SI are triggered by external stimuli that he cannot predict. Denies current SI/HI/AVH. Denies suicidality/intent or plan and denies homicidality/intent or plan but admits to OD on meds.   9/9: Pt endorses good mood, denies AVH/SI/HI. Appreciate MRSA scalp abscess wound washout 9/8. Awaiting medical clearance for admission to [facility].   9/10: Pt alert, eating breakfast. Reports good mood, denies suicidality/intent or plan, AVH, or HI.       Plans:  Continue 1:1 and safety precautions for suicidality  - May need IVIG (would coordinate with sister about this) prior to [facility] transfer as he is due for it on 9/12  -C/w Haldol 5mg qHS PO  -C/w cogentin to 1 mg BID PO  -C/w lexapro 10 mg daily PO  -PRN for agitation Haldol 5mg + Ativan mg q6h PRN PO/IM/IV  -Dispo, inpt psych once medically cleared

## 2024-09-10 NOTE — DIETITIAN INITIAL EVALUATION ADULT - NS FNS DIET ORDER
Diet, Regular:   Consistent Carbohydrate {No Snacks} (CSTCHO)  DASH/TLC {Sodium & Cholesterol Restricted} (DASH) (09-03-24 @ 12:12) [Active]

## 2024-09-11 ENCOUNTER — INPATIENT (INPATIENT)
Facility: HOSPITAL | Age: 59
LOS: 18 days | Discharge: HOME | End: 2024-09-30
Attending: STUDENT IN AN ORGANIZED HEALTH CARE EDUCATION/TRAINING PROGRAM | Admitting: STUDENT IN AN ORGANIZED HEALTH CARE EDUCATION/TRAINING PROGRAM
Payer: MEDICARE

## 2024-09-11 ENCOUNTER — TRANSCRIPTION ENCOUNTER (OUTPATIENT)
Age: 59
End: 2024-09-11

## 2024-09-11 VITALS
RESPIRATION RATE: 18 BRPM | OXYGEN SATURATION: 99 % | TEMPERATURE: 98 F | HEART RATE: 60 BPM | SYSTOLIC BLOOD PRESSURE: 137 MMHG | DIASTOLIC BLOOD PRESSURE: 77 MMHG

## 2024-09-11 VITALS — TEMPERATURE: 97 F | RESPIRATION RATE: 18 BRPM | WEIGHT: 236.78 LBS | HEIGHT: 74 IN

## 2024-09-11 DIAGNOSIS — E03.9 HYPOTHYROIDISM, UNSPECIFIED: ICD-10-CM

## 2024-09-11 DIAGNOSIS — K08.199 COMPLETE LOSS OF TEETH DUE TO OTHER SPECIFIED CAUSE, UNSPECIFIED CLASS: Chronic | ICD-10-CM

## 2024-09-11 DIAGNOSIS — F25.9 SCHIZOAFFECTIVE DISORDER, UNSPECIFIED: ICD-10-CM

## 2024-09-11 DIAGNOSIS — J34.2 DEVIATED NASAL SEPTUM: Chronic | ICD-10-CM

## 2024-09-11 DIAGNOSIS — D83.9 COMMON VARIABLE IMMUNODEFICIENCY, UNSPECIFIED: ICD-10-CM

## 2024-09-11 DIAGNOSIS — I10 ESSENTIAL (PRIMARY) HYPERTENSION: ICD-10-CM

## 2024-09-11 LAB — GLUCOSE BLDC GLUCOMTR-MCNC: 141 MG/DL — HIGH (ref 70–99)

## 2024-09-11 PROCEDURE — 99222 1ST HOSP IP/OBS MODERATE 55: CPT | Mod: FS

## 2024-09-11 PROCEDURE — 99232 SBSQ HOSP IP/OBS MODERATE 35: CPT

## 2024-09-11 RX ORDER — ACETAMINOPHEN 325 MG
650 TABLET ORAL EVERY 6 HOURS
Refills: 0 | Status: DISCONTINUED | OUTPATIENT
Start: 2024-09-11 | End: 2024-09-30

## 2024-09-11 RX ORDER — INSULIN LISPRO 100/ML
VIAL (ML) SUBCUTANEOUS AT BEDTIME
Refills: 0 | Status: DISCONTINUED | OUTPATIENT
Start: 2024-09-11 | End: 2024-09-23

## 2024-09-11 RX ORDER — BENZTROPINE MESYLATE 2 MG
1 TABLET ORAL AT BEDTIME
Refills: 0 | Status: DISCONTINUED | OUTPATIENT
Start: 2024-09-11 | End: 2024-09-30

## 2024-09-11 RX ORDER — LABETALOL HYDROCHLORIDE 200 MG/1
200 TABLET, FILM COATED ORAL
Refills: 0 | Status: DISCONTINUED | OUTPATIENT
Start: 2024-09-11 | End: 2024-09-30

## 2024-09-11 RX ORDER — HALOPERIDOL LACTATE 2 MG/ML
5 CONCENTRATE, ORAL ORAL AT BEDTIME
Refills: 0 | Status: DISCONTINUED | OUTPATIENT
Start: 2024-09-11 | End: 2024-09-23

## 2024-09-11 RX ORDER — INFLUENZA VIRUS VACCINE 15; 15; 15; 15 UG/.5ML; UG/.5ML; UG/.5ML; UG/.5ML
0.5 SUSPENSION INTRAMUSCULAR ONCE
Refills: 0 | Status: COMPLETED | OUTPATIENT
Start: 2024-09-11 | End: 2024-09-21

## 2024-09-11 RX ORDER — ATORVASTATIN CALCIUM 10 MG/1
40 TABLET, FILM COATED ORAL AT BEDTIME
Refills: 0 | Status: DISCONTINUED | OUTPATIENT
Start: 2024-09-11 | End: 2024-09-30

## 2024-09-11 RX ORDER — INSULIN LISPRO 100/ML
VIAL (ML) SUBCUTANEOUS
Refills: 0 | Status: DISCONTINUED | OUTPATIENT
Start: 2024-09-11 | End: 2024-09-23

## 2024-09-11 RX ORDER — HALOPERIDOL 1 MG
1 TABLET ORAL
Qty: 0 | Refills: 0 | DISCHARGE
Start: 2024-09-11

## 2024-09-11 RX ORDER — ESCITALOPRAM OXALATE 10 MG
10 TABLET ORAL DAILY
Refills: 0 | Status: DISCONTINUED | OUTPATIENT
Start: 2024-09-11 | End: 2024-09-30

## 2024-09-11 RX ORDER — CLINDAMYCIN PHOSPHATE 150 MG/ML
450 INJECTION, SOLUTION INTRAVENOUS EVERY 8 HOURS
Refills: 0 | Status: COMPLETED | OUTPATIENT
Start: 2024-09-11 | End: 2024-09-15

## 2024-09-11 RX ORDER — HALOPERIDOL LACTATE 2 MG/ML
5 CONCENTRATE, ORAL ORAL EVERY 6 HOURS
Refills: 0 | Status: DISCONTINUED | OUTPATIENT
Start: 2024-09-11 | End: 2024-09-30

## 2024-09-11 RX ORDER — 5-HYDROXYTRYPTOPHAN (5-HTP) 100 MG
3 TABLET,DISINTEGRATING ORAL AT BEDTIME
Refills: 0 | Status: DISCONTINUED | OUTPATIENT
Start: 2024-09-11 | End: 2024-09-30

## 2024-09-11 RX ORDER — HALOPERIDOL LACTATE 2 MG/ML
5 CONCENTRATE, ORAL ORAL ONCE
Refills: 0 | Status: DISCONTINUED | OUTPATIENT
Start: 2024-09-11 | End: 2024-09-30

## 2024-09-11 RX ORDER — METFORMIN HCL 500 MG
500 TABLET ORAL
Refills: 0 | Status: DISCONTINUED | OUTPATIENT
Start: 2024-09-11 | End: 2024-09-30

## 2024-09-11 RX ORDER — HALOPERIDOL LACTATE 2 MG/ML
200 CONCENTRATE, ORAL ORAL ONCE
Refills: 0 | Status: COMPLETED | OUTPATIENT
Start: 2024-09-18 | End: 2024-09-18

## 2024-09-11 RX ORDER — CLINDAMYCIN PHOSPHATE 150 MG/ML
3 VIAL (ML) INJECTION
Qty: 0 | Refills: 0 | DISCHARGE
Start: 2024-09-11

## 2024-09-11 RX ORDER — HALOPERIDOL 1 MG
200 TABLET ORAL
Qty: 0 | Refills: 0 | DISCHARGE

## 2024-09-11 RX ORDER — AMLODIPINE BESYLATE 5 MG
10 TABLET ORAL DAILY
Refills: 0 | Status: DISCONTINUED | OUTPATIENT
Start: 2024-09-11 | End: 2024-09-30

## 2024-09-11 RX ORDER — HYDROXYZINE PAMOATE 50 MG
50 CAPSULE ORAL EVERY 6 HOURS
Refills: 0 | Status: DISCONTINUED | OUTPATIENT
Start: 2024-09-11 | End: 2024-09-30

## 2024-09-11 RX ORDER — TAMSULOSIN HCL 0.4 MG
0.4 CAPSULE ORAL AT BEDTIME
Refills: 0 | Status: DISCONTINUED | OUTPATIENT
Start: 2024-09-11 | End: 2024-09-30

## 2024-09-11 RX ADMIN — Medication 450 MILLIGRAM(S): at 05:34

## 2024-09-11 RX ADMIN — Medication 450 MILLIGRAM(S): at 11:05

## 2024-09-11 RX ADMIN — ENOXAPARIN SODIUM 40 MILLIGRAM(S): 100 INJECTION SUBCUTANEOUS at 06:16

## 2024-09-11 RX ADMIN — Medication 500 MILLIGRAM(S): at 20:36

## 2024-09-11 RX ADMIN — Medication 2 MILLIGRAM(S): at 17:10

## 2024-09-11 RX ADMIN — Medication 5 MILLIGRAM(S): at 20:36

## 2024-09-11 RX ADMIN — AMLODIPINE BESYLATE 10 MILLIGRAM(S): 10 TABLET ORAL at 05:32

## 2024-09-11 RX ADMIN — Medication 50 MILLIGRAM(S): at 20:36

## 2024-09-11 RX ADMIN — Medication 1 MILLIGRAM(S): at 20:36

## 2024-09-11 RX ADMIN — Medication 50 MICROGRAM(S): at 05:32

## 2024-09-11 RX ADMIN — CHLORHEXIDINE GLUCONATE 1 APPLICATION(S): 40 SOLUTION TOPICAL at 11:05

## 2024-09-11 RX ADMIN — Medication 0.4 MILLIGRAM(S): at 20:36

## 2024-09-11 RX ADMIN — BENZTROPINE MESYLATE 1 MILLIGRAM(S): 2 TABLET ORAL at 05:33

## 2024-09-11 RX ADMIN — Medication 200 MILLIGRAM(S): at 05:33

## 2024-09-11 RX ADMIN — ATORVASTATIN CALCIUM 40 MILLIGRAM(S): 10 TABLET, FILM COATED ORAL at 20:36

## 2024-09-11 RX ADMIN — Medication 1 APPLICATION(S): at 05:41

## 2024-09-11 RX ADMIN — CLINDAMYCIN PHOSPHATE 450 MILLIGRAM(S): 150 INJECTION, SOLUTION INTRAVENOUS at 21:21

## 2024-09-11 RX ADMIN — LABETALOL HYDROCHLORIDE 200 MILLIGRAM(S): 200 TABLET, FILM COATED ORAL at 20:36

## 2024-09-11 RX ADMIN — ESCITALOPRAM OXALATE 10 MILLIGRAM(S): 10 TABLET ORAL at 11:06

## 2024-09-11 RX ADMIN — Medication 3 MILLIGRAM(S): at 20:36

## 2024-09-11 NOTE — DISCHARGE NOTE NURSING/CASE MANAGEMENT/SOCIAL WORK - NSDCPEFALRISK_GEN_ALL_CORE
For information on Fall & Injury Prevention, visit: [email or web address] OR  [email or web address] OR  https://www.cdc.gov/steadi/patient.html

## 2024-09-11 NOTE — PROGRESS NOTE ADULT - SUBJECTIVE AND OBJECTIVE BOX
[facility], Division of Infectious Diseases  MEGHANA Sosa Y. Patel, S. Shah, G. Emmett  [phone]  ([phone] - weekdays after 5pm and weekends)    Name: DAT WOOD  Age/Gender: 58y Male  MRN: [id number]    Interval History:  Notes reviewed.   No concerning overnight events.  Afebrile.   reports no pain from scalp wound    Allergies: Gammagard (Short breath; Rash)  penicillin (Rash)  amoxicillin (Fever)      Objective:  Vitals:   T(F): 97.7 (09-10-24 @ 06:34), Max: 98.2 (09-10-24 @ 02:05)  HR: 52 (09-10-24 @ 06:34) (50 - 64)  BP: 153/80 (09-10-24 @ 06:34) (123/82 - 153/80)  RR: 18 (09-10-24 @ 06:34) (17 - 18)  SpO2: 97% (09-10-24 @ 06:34) (97% - 100%)  Physical Examination:  General: no acute distress  HEENT: anicteric, scalp wound to subq, no drainage, no tenderness  Cardio: S1, S2, normal rate  Resp: clear to auscultation anteriorly   Abd: soft, NT, ND  Ext: no LE edema  Skin: warm, dry, axilla no longer tender, no erythema    Laboratory Studies:  CBC:   WBC Trend:  6.75 09-05-24 @ 05:39  6.66 09-04-24 @ 04:55    CMP:           Vancomycin Level, Trough: 12.5 ug/mL (09-09-24 @ 16:18)      Microbiology: reviewed     Culture - Tissue with Gram Stain (collected 09-04-24 @ 17:02)  Source: Tissue Other, occipital scalp  Gram Stain (09-05-24 @ 22:03):    No polymorphonuclear cells seen per low power field    No organisms seen per oil power field  Final Report (09-09-24 @ 21:10):    Few Methicillin Resistant Staphylococcus aureus  Organism: Methicillin resistant Staphylococcus aureus (09-09-24 @ 21:10)  Organism: Methicillin resistant Staphylococcus aureus (09-09-24 @ 21:10)      Method Type: HATTIE      -  Clindamycin: S 0.5      -  Daptomycin: S 1      -  Erythromycin: R >4      -  Gentamicin: S <=1 Should not be used as monotherapy      -  Linezolid: S 4      -  Oxacillin: R >2      -  Penicillin: R >8      -  Rifampin: S <=1 Should not be used as monotherapy      -  Tetracycline: R >8      -  Trimethoprim/Sulfamethoxazole: S <=0.5/9.5      -  Vancomycin: S 2    Culture - Fungal, Tissue (collected 09-04-24 @ 16:20)  Source: Tissue Other, occipital scalp  Preliminary Report (09-07-24 @ 23:03):    Culture is being performed. Fungal cultures are held for 4 weeks.    Culture - Acid Fast - Tissue w/Smear (collected 09-04-24 @ 16:20)  Source: Tissue Other, occipital scalp  Preliminary Report (09-07-24 @ 23:08):    Culture is being performed.        Radiology: reviewed     Medications:  acetaminophen     Tablet .. 650 milliGRAM(s) Oral every 6 hours PRN  amLODIPine   Tablet 10 milliGRAM(s) Oral daily  atorvastatin 40 milliGRAM(s) Oral at bedtime  benztropine 1 milliGRAM(s) Oral two times a day  chlorhexidine 2% Cloths 1 Application(s) Topical daily  clindamycin   Capsule 450 milliGRAM(s) Oral every 8 hours  dextrose 5%. 1000 milliLiter(s) IV Continuous <Continuous>  dextrose 5%. 1000 milliLiter(s) IV Continuous <Continuous>  dextrose 50% Injectable 25 Gram(s) IV Push once  dextrose 50% Injectable 12.5 Gram(s) IV Push once  dextrose 50% Injectable 25 Gram(s) IV Push once  dextrose Oral Gel 15 Gram(s) Oral once PRN  enoxaparin Injectable 40 milliGRAM(s) SubCutaneous every 24 hours  escitalopram 10 milliGRAM(s) Oral daily  glucagon  Injectable 1 milliGRAM(s) IntraMuscular once  haloperidol     Tablet 5 milliGRAM(s) Oral at bedtime  labetalol 200 milliGRAM(s) Oral two times a day  levothyroxine 50 MICROGram(s) Oral daily  melatonin 3 milliGRAM(s) Oral at bedtime PRN  mupirocin 2% Ointment 1 Application(s) Topical two times a day  polyethylene glycol 3350 17 Gram(s) Oral daily PRN    Antimicrobials:  clindamycin   Capsule 450 milliGRAM(s) Oral every 8 hours  
[facility], Division of Infectious Diseases  MEGHANA Sosa Y. Patel, S. Shah, G. Emmett  [phone]  ([phone] - weekdays after 5pm and weekends)    Name: DAT WOOD  Age/Gender: 58y Male  MRN: [id number]    Interval History:  Notes reviewed.   No concerning overnight events.  Afebrile.     Allergies: Gammagard (Short breath; Rash)  penicillin (Rash)  amoxicillin (Fever)      Objective:  Vitals:   T(F): 98.8 (09-07-24 @ 05:20), Max: 98.8 (09-07-24 @ 05:20)  HR: 64 (09-07-24 @ 05:20) (60 - 74)  BP: 127/81 (09-07-24 @ 05:20) (127/81 - 160/92)  RR: 18 (09-07-24 @ 05:20) (17 - 18)  SpO2: 99% (09-07-24 @ 05:20) (97% - 100%)  Physical Examination:  General: no acute distress  HEENT: anicteric  Cardio: normal rate  Resp: breathing comfortably on RA   Abd: soft, NT, ND  Ext: no LE edema  Skin: scalp furuncle w/ overlying dressing    Laboratory Studies:  CBC:   WBC Trend:  6.75 09-05-24 @ 05:39  6.66 09-04-24 @ 04:55  9.58 09-02-24 @ 20:45    CMP:               Microbiology: reviewed     Culture - Tissue with Gram Stain (collected 09-04-24 @ 17:02)  Source: Tissue Other, occipital scalp  Gram Stain (09-05-24 @ 22:03):    No polymorphonuclear cells seen per low power field    No organisms seen per oil power field  Preliminary Report (09-06-24 @ 18:26):    Few Methicillin Resistant Staphylococcus aureus  Organism: Methicillin resistant Staphylococcus aureus (09-06-24 @ 18:26)  Organism: Methicillin resistant Staphylococcus aureus (09-06-24 @ 18:26)      Method Type: HATTIE      -  Clindamycin: S 0.5      -  Daptomycin: S 1      -  Erythromycin: R >4      -  Gentamicin: S <=1 Should not be used as monotherapy      -  Linezolid: S 4      -  Oxacillin: R >2      -  Penicillin: R >8      -  Rifampin: S <=1 Should not be used as monotherapy      -  Tetracycline: R >8      -  Trimethoprim/Sulfamethoxazole: S <=0.5/9.5      -  Vancomycin: S 2    Culture - Fungal, Tissue (collected 09-04-24 @ 16:20)  Source: Tissue Other, occipital scalp  Preliminary Report (09-05-24 @ 10:58):    Testing in progress    Culture - Acid Fast - Tissue w/Smear (collected 09-04-24 @ 16:20)  Source: Tissue Other, occipital scalp        Radiology: reviewed     Medications:  acetaminophen     Tablet .. 650 milliGRAM(s) Oral every 6 hours PRN  amLODIPine   Tablet 10 milliGRAM(s) Oral daily  atorvastatin 40 milliGRAM(s) Oral at bedtime  benztropine 1 milliGRAM(s) Oral two times a day  chlorhexidine 2% Cloths 1 Application(s) Topical daily  clindamycin   Capsule 450 milliGRAM(s) Oral every 8 hours  dextrose 5%. 1000 milliLiter(s) IV Continuous <Continuous>  dextrose 5%. 1000 milliLiter(s) IV Continuous <Continuous>  dextrose 50% Injectable 25 Gram(s) IV Push once  dextrose 50% Injectable 25 Gram(s) IV Push once  dextrose 50% Injectable 12.5 Gram(s) IV Push once  dextrose Oral Gel 15 Gram(s) Oral once PRN  enoxaparin Injectable 40 milliGRAM(s) SubCutaneous every 24 hours  escitalopram 10 milliGRAM(s) Oral daily  glucagon  Injectable 1 milliGRAM(s) IntraMuscular once  haloperidol     Tablet 5 milliGRAM(s) Oral at bedtime  labetalol 200 milliGRAM(s) Oral two times a day  levothyroxine 50 MICROGram(s) Oral daily  melatonin 3 milliGRAM(s) Oral at bedtime PRN  mupirocin 2% Ointment 1 Application(s) Topical two times a day    Antimicrobials:  clindamycin   Capsule 450 milliGRAM(s) Oral every 8 hours  
[facility], Division of Infectious Diseases  MEGHANA Sosa Y. Patel, S. Shah, G. Emmett  [phone]  ([phone] - weekdays after 5pm and weekends)    Name: DAT WOOD  Age/Gender: 58y Male  MRN: [id number]    Interval History:  Notes reviewed.   No concerning overnight events.  Afebrile.   s/p I&D yesterday  denies any more pain in his scalp or in axilla    Allergies: Gammagard (Short breath; Rash)  penicillin (Rash)  amoxicillin (Fever)      Objective:  Vitals:   T(F): 97.4 (09-09-24 @ 05:00), Max: 97.9 (09-08-24 @ 22:00)  HR: 50 (09-09-24 @ 05:00) (50 - 59)  BP: 125/78 (09-09-24 @ 05:00) (125/78 - 133/79)  RR: 18 (09-09-24 @ 05:00) (17 - 18)  SpO2: 99% (09-09-24 @ 05:00) (96% - 99%)  Physical Examination:  General: no acute distress  HEENT: anicteric, scalp wound to subq, no drainage, no tenderness  Cardio: S1, S2, normal rate  Resp: clear to auscultation anteriorly   Abd: soft, NT, ND  Ext: no LE edema  Skin: warm, dry    Laboratory Studies:  CBC:   WBC Trend:  6.75 09-05-24 @ 05:39  6.66 09-04-24 @ 04:55  9.58 09-02-24 @ 20:45    CMP:               Microbiology: reviewed     Culture - Tissue with Gram Stain (collected 09-04-24 @ 17:02)  Source: Tissue Other, occipital scalp  Gram Stain (09-05-24 @ 22:03):    No polymorphonuclear cells seen per low power field    No organisms seen per oil power field  Preliminary Report (09-06-24 @ 18:26):    Few Methicillin Resistant Staphylococcus aureus  Organism: Methicillin resistant Staphylococcus aureus (09-06-24 @ 18:26)  Organism: Methicillin resistant Staphylococcus aureus (09-06-24 @ 18:26)      Method Type: HATTIE      -  Clindamycin: S 0.5      -  Daptomycin: S 1      -  Erythromycin: R >4      -  Gentamicin: S <=1 Should not be used as monotherapy      -  Linezolid: S 4      -  Oxacillin: R >2      -  Penicillin: R >8      -  Rifampin: S <=1 Should not be used as monotherapy      -  Tetracycline: R >8      -  Trimethoprim/Sulfamethoxazole: S <=0.5/9.5      -  Vancomycin: S 2    Culture - Fungal, Tissue (collected 09-04-24 @ 16:20)  Source: Tissue Other, occipital scalp  Preliminary Report (09-07-24 @ 23:03):    Culture is being performed. Fungal cultures are held for 4 weeks.    Culture - Acid Fast - Tissue w/Smear (collected 09-04-24 @ 16:20)  Source: Tissue Other, occipital scalp  Preliminary Report (09-07-24 @ 23:08):    Culture is being performed.        Radiology: reviewed     Medications:  acetaminophen     Tablet .. 650 milliGRAM(s) Oral every 6 hours PRN  amLODIPine   Tablet 10 milliGRAM(s) Oral daily  atorvastatin 40 milliGRAM(s) Oral at bedtime  benztropine 1 milliGRAM(s) Oral two times a day  chlorhexidine 2% Cloths 1 Application(s) Topical daily  dextrose 5%. 1000 milliLiter(s) IV Continuous <Continuous>  dextrose 5%. 1000 milliLiter(s) IV Continuous <Continuous>  dextrose 50% Injectable 25 Gram(s) IV Push once  dextrose 50% Injectable 12.5 Gram(s) IV Push once  dextrose 50% Injectable 25 Gram(s) IV Push once  dextrose Oral Gel 15 Gram(s) Oral once PRN  enoxaparin Injectable 40 milliGRAM(s) SubCutaneous every 24 hours  escitalopram 10 milliGRAM(s) Oral daily  glucagon  Injectable 1 milliGRAM(s) IntraMuscular once  haloperidol     Tablet 5 milliGRAM(s) Oral at bedtime  immune  globulin 10% (GAMMAGARD S/D) IVPB 70 Gram(s) IV Intermittent once  labetalol 200 milliGRAM(s) Oral two times a day  levothyroxine 50 MICROGram(s) Oral daily  melatonin 3 milliGRAM(s) Oral at bedtime PRN  mupirocin 2% Ointment 1 Application(s) Topical two times a day  vancomycin  IVPB 1500 milliGRAM(s) IV Intermittent every 12 hours    Antimicrobials:  vancomycin  IVPB 1500 milliGRAM(s) IV Intermittent every 12 hours  
[facility], Division of Infectious Diseases  MEGHANA Sosa Y. Patel, S. Shah, G. Emmett  [phone]  ([phone] - weekdays after 5pm and weekends)    Name: DAT WOOD  Age/Gender: 58y Male  MRN: [id number]    Interval History:  Notes reviewed.   No concerning overnight events.  Afebrile.   reports lots of drainage from scalp    Allergies: Gammagard (Short breath; Rash)  penicillin (Rash)  amoxicillin (Fever)      Objective:  Vitals:   T(F): 98.4 (09-08-24 @ 05:00), Max: 99.3 (09-07-24 @ 21:53)  HR: 62 (09-08-24 @ 05:00) (62 - 64)  BP: 131/76 (09-08-24 @ 05:00) (128/75 - 146/90)  RR: 17 (09-08-24 @ 05:00) (17 - 18)  SpO2: 99% (09-08-24 @ 05:00) (98% - 99%)  Physical Examination:  General: no acute distress  HEENT: anicteric, significant purulent drainage from scalp  Cardio: S1, S2, normal rate  Resp: breathing comfortably on RA   Abd: soft, NT, ND  Ext: no LE edema  Skin: warm, dry    Laboratory Studies:  CBC:   WBC Trend:  6.75 09-05-24 @ 05:39  6.66 09-04-24 @ 04:55  9.58 09-02-24 @ 20:45    CMP:               Microbiology: reviewed     Culture - Tissue with Gram Stain (collected 09-04-24 @ 17:02)  Source: Tissue Other, occipital scalp  Gram Stain (09-05-24 @ 22:03):    No polymorphonuclear cells seen per low power field    No organisms seen per oil power field  Preliminary Report (09-06-24 @ 18:26):    Few Methicillin Resistant Staphylococcus aureus  Organism: Methicillin resistant Staphylococcus aureus (09-06-24 @ 18:26)  Organism: Methicillin resistant Staphylococcus aureus (09-06-24 @ 18:26)      Method Type: HATTIE      -  Clindamycin: S 0.5      -  Daptomycin: S 1      -  Erythromycin: R >4      -  Gentamicin: S <=1 Should not be used as monotherapy      -  Linezolid: S 4      -  Oxacillin: R >2      -  Penicillin: R >8      -  Rifampin: S <=1 Should not be used as monotherapy      -  Tetracycline: R >8      -  Trimethoprim/Sulfamethoxazole: S <=0.5/9.5      -  Vancomycin: S 2    Culture - Fungal, Tissue (collected 09-04-24 @ 16:20)  Source: Tissue Other, occipital scalp  Preliminary Report (09-07-24 @ 23:03):    Culture is being performed. Fungal cultures are held for 4 weeks.    Culture - Acid Fast - Tissue w/Smear (collected 09-04-24 @ 16:20)  Source: Tissue Other, occipital scalp  Preliminary Report (09-07-24 @ 23:08):    Culture is being performed.        Radiology: reviewed     Medications:  acetaminophen     Tablet .. 650 milliGRAM(s) Oral every 6 hours PRN  amLODIPine   Tablet 10 milliGRAM(s) Oral daily  atorvastatin 40 milliGRAM(s) Oral at bedtime  benztropine 1 milliGRAM(s) Oral two times a day  chlorhexidine 2% Cloths 1 Application(s) Topical daily  dextrose 5%. 1000 milliLiter(s) IV Continuous <Continuous>  dextrose 5%. 1000 milliLiter(s) IV Continuous <Continuous>  dextrose 50% Injectable 25 Gram(s) IV Push once  dextrose 50% Injectable 12.5 Gram(s) IV Push once  dextrose 50% Injectable 25 Gram(s) IV Push once  dextrose Oral Gel 15 Gram(s) Oral once PRN  enoxaparin Injectable 40 milliGRAM(s) SubCutaneous every 24 hours  escitalopram 10 milliGRAM(s) Oral daily  glucagon  Injectable 1 milliGRAM(s) IntraMuscular once  haloperidol     Tablet 5 milliGRAM(s) Oral at bedtime  labetalol 200 milliGRAM(s) Oral two times a day  levothyroxine 50 MICROGram(s) Oral daily  melatonin 3 milliGRAM(s) Oral at bedtime PRN  mupirocin 2% Ointment 1 Application(s) Topical two times a day  vancomycin  IVPB 1500 milliGRAM(s) IV Intermittent every 12 hours    Antimicrobials:  vancomycin  IVPB 1500 milliGRAM(s) IV Intermittent every 12 hours  
[facility], Division of Infectious Diseases  MEGHANA Sosa Y. Patel, S. Shah, G. Emmett  [phone]  ([phone] - weekdays after 5pm and weekends)    Name: DAT WOOD  Age/Gender: 58y Male  MRN: [id number]    Interval History:  Notes reviewed.   No concerning overnight events.  Afebrile.   denies abd pain, diarrhea, nausea or vomiting  no pain to scalp wound    sisters at bedside    Allergies: Gammagard (Short breath; Rash)  penicillin (Rash)  amoxicillin (Fever)      Objective:  Vitals:   T(F): 97.8 (09-11-24 @ 05:30), Max: 98 (09-10-24 @ 17:10)  HR: 61 (09-11-24 @ 05:30) (53 - 61)  BP: 138/79 (09-11-24 @ 05:30) (116/75 - 142/88)  RR: 18 (09-11-24 @ 05:30) (17 - 18)  SpO2: 98% (09-11-24 @ 05:30) (98% - 100%)  Physical Examination:  General: no acute distress  HEENT: anicteric, scalp wound to subq, no drainage, no tenderness  Cardio: S1, S2, normal rate  Resp: clear to auscultation anteriorly   Abd: soft, NT, ND  Ext: no LE edema  Skin: warm, dry, R axilla no longer tender, no erythema    Laboratory Studies:  CBC:   WBC Trend:  6.75 09-05-24 @ 05:39    CMP:           Vancomycin Level, Trough: 12.5 ug/mL (09-09-24 @ 16:18)      Microbiology: reviewed     Culture - Tissue with Gram Stain (collected 09-04-24 @ 17:02)  Source: Tissue Other, occipital scalp  Gram Stain (09-05-24 @ 22:03):    No polymorphonuclear cells seen per low power field    No organisms seen per oil power field  Final Report (09-09-24 @ 21:10):    Few Methicillin Resistant Staphylococcus aureus  Organism: Methicillin resistant Staphylococcus aureus (09-09-24 @ 21:10)  Organism: Methicillin resistant Staphylococcus aureus (09-09-24 @ 21:10)      -  Clindamycin: S 0.5      -  Oxacillin: R >2      -  Gentamicin: S <=1 Should not be used as monotherapy      -  Daptomycin: S 1      -  Linezolid: S 4      -  Vancomycin: S 2      -  Tetracycline: R >8      Method Type: HATTIE      -  Penicillin: R >8      -  Rifampin: S <=1 Should not be used as monotherapy      -  Erythromycin: R >4      -  Trimethoprim/Sulfamethoxazole: S <=0.5/9.5    Culture - Fungal, Tissue (collected 09-04-24 @ 16:20)  Source: Tissue Other, occipital scalp  Preliminary Report (09-07-24 @ 23:03):    Culture is being performed. Fungal cultures are held for 4 weeks.    Culture - Acid Fast - Tissue w/Smear (collected 09-04-24 @ 16:20)  Source: Tissue Other, occipital scalp  Preliminary Report (09-07-24 @ 23:08):    Culture is being performed.        Radiology: reviewed     Medications:  acetaminophen     Tablet .. 650 milliGRAM(s) Oral every 6 hours PRN  amLODIPine   Tablet 10 milliGRAM(s) Oral daily  atorvastatin 40 milliGRAM(s) Oral at bedtime  benztropine 1 milliGRAM(s) Oral two times a day  chlorhexidine 2% Cloths 1 Application(s) Topical daily  clindamycin   Capsule 450 milliGRAM(s) Oral every 8 hours  dextrose 5%. 1000 milliLiter(s) IV Continuous <Continuous>  dextrose 5%. 1000 milliLiter(s) IV Continuous <Continuous>  dextrose 50% Injectable 25 Gram(s) IV Push once  dextrose 50% Injectable 12.5 Gram(s) IV Push once  dextrose 50% Injectable 25 Gram(s) IV Push once  dextrose Oral Gel 15 Gram(s) Oral once PRN  enoxaparin Injectable 40 milliGRAM(s) SubCutaneous every 24 hours  escitalopram 10 milliGRAM(s) Oral daily  glucagon  Injectable 1 milliGRAM(s) IntraMuscular once  haloperidol     Tablet 5 milliGRAM(s) Oral at bedtime  labetalol 200 milliGRAM(s) Oral two times a day  levothyroxine 50 MICROGram(s) Oral daily  melatonin 3 milliGRAM(s) Oral at bedtime PRN  mupirocin 2% Ointment 1 Application(s) Topical two times a day  polyethylene glycol 3350 17 Gram(s) Oral daily PRN    Antimicrobials:  clindamycin   Capsule 450 milliGRAM(s) Oral every 8 hours  
GENERAL SURGERY PROGRESS NOTE    Patient: DAT WOOD , 58y ([birth year])Male   MRN: [id number]  Location: [address]  Visit: 09-03-24 Inpatient  Date: 09-09-24 @ 06:31    Subjective: No acute events overnight. Patient resting comfortably in bed, no complaints. No N/V. No pain.     PAST MEDICAL & SURGICAL HISTORY:  Smoker      DM (diabetes mellitus)      HTN (hypertension)      Abscess of finger      Bipolar disorder      Chronic hyponatremia      CVID (common variable immunodeficiency)      Hyponatremia      Smoker      Deviated septum      Loss of teeth due to extraction  All teeth due to dental carries          Vitals: T(F): 97.4 (09-09-24 @ 05:00), Max: 97.9 (09-08-24 @ 22:00)  HR: 50 (09-09-24 @ 05:00)  BP: 125/78 (09-09-24 @ 05:00)  RR: 18 (09-09-24 @ 05:00)  SpO2: 99% (09-09-24 @ 05:00)      Diet, Regular:   Consistent Carbohydrate No Snacks (CSTCHO)  DASH/TLC Sodium & Cholesterol Restricted (DASH)      09-07-24 @ 07:01  -  09-08-24 @ 07:00  --------------------------------------------------------  IN:    Oral Fluid: 2180 mL  Total IN: 2180 mL    OUT:  Total OUT: 0 mL    Total NET: 2180 mL    PHYSICAL EXAM  GEN: No acute distress   HEAD: 2cm ulceration on back of head with purulent drainage and erythema confined to borders. No pain or erythema surrounding.   CV: RRR, no JVD  LUNGS: Respirations unlabored, no use of accessory muscles  ABD: Abdomen soft, ND, NT   EXT: No peripheral edema. Regular range of motion.       MEDICATIONS  (STANDING):  amLODIPine   Tablet 10 milliGRAM(s) Oral daily  atorvastatin 40 milliGRAM(s) Oral at bedtime  benztropine 1 milliGRAM(s) Oral two times a day  chlorhexidine 2% Cloths 1 Application(s) Topical daily  dextrose 5%. 1000 milliLiter(s) (50 mL/Hr) IV Continuous <Continuous>  dextrose 5%. 1000 milliLiter(s) (100 mL/Hr) IV Continuous <Continuous>  dextrose 50% Injectable 25 Gram(s) IV Push once  dextrose 50% Injectable 12.5 Gram(s) IV Push once  dextrose 50% Injectable 25 Gram(s) IV Push once  enoxaparin Injectable 40 milliGRAM(s) SubCutaneous every 24 hours  escitalopram 10 milliGRAM(s) Oral daily  glucagon  Injectable 1 milliGRAM(s) IntraMuscular once  haloperidol     Tablet 5 milliGRAM(s) Oral at bedtime  labetalol 200 milliGRAM(s) Oral two times a day  levothyroxine 50 MICROGram(s) Oral daily  mupirocin 2% Ointment 1 Application(s) Topical two times a day  vancomycin  IVPB 1500 milliGRAM(s) IV Intermittent every 12 hours    MEDICATIONS  (PRN):  acetaminophen     Tablet .. 650 milliGRAM(s) Oral every 6 hours PRN Temp greater or equal to 38C (100.4F), Mild Pain (1 - 3)  dextrose Oral Gel 15 Gram(s) Oral once PRN Blood Glucose LESS THAN 70 milliGRAM(s)/deciliter  melatonin 3 milliGRAM(s) Oral at bedtime PRN Insomnia      DVT PROPHYLAXIS: SCDs, enoxaparin Injectable 40 milliGRAM(s) SubCutaneous every 24 hours    GI PROPHYLAXIS:   ANTICOAGULATION:   ANTIBIOTICS: vancomycin  IVPB 1500 milliGRAM(s)        LAB/STUDIES:  CAPILLARY BLOOD GLUCOSE      POCT Blood Glucose.: 118 mg/dL (08 Sep 2024 16:13)  POCT Blood Glucose.: 152 mg/dL (08 Sep 2024 10:48)  POCT Blood Glucose.: 110 mg/dL (08 Sep 2024 07:19)                            
SUBJECTIVE/ OVERNIGHT EVENTS:  No overnight events.  No complaints.  No cp, sob, abdominal pain.  No n/v/d. no HA/dizziness.   states he is feeling better  open small boil back of the head.   no bleeding. derm consulted.       --------------------------------------------------------------------------------------------  LABS:                        11.2   6.66  )-----------( 164      ( 04 Sep 2024 04:55 )             33.6     09-04    136  |  100  |  7   ----------------------------<  97  3.7   |  23  |  0.96    Ca    9.2      04 Sep 2024 04:55  Phos  3.4     09-04  Mg     2.10     09-04    TPro  7.4  /  Alb  4.3  /  TBili  <0.2  /  DBili  x   /  AST  16  /  ALT  14  /  AlkPhos  115  09-02      CAPILLARY BLOOD GLUCOSE      POCT Blood Glucose.: 145 mg/dL (04 Sep 2024 11:34)  POCT Blood Glucose.: 94 mg/dL (04 Sep 2024 06:47)  POCT Blood Glucose.: 106 mg/dL (03 Sep 2024 19:59)  POCT Blood Glucose.: 115 mg/dL (03 Sep 2024 18:44)        Urinalysis Basic - ( 04 Sep 2024 04:55 )    Color: x / Appearance: x / SG: x / pH: x  Gluc: 97 mg/dL / Ketone: x  / Bili: x / Urobili: x   Blood: x / Protein: x / Nitrite: x   Leuk Esterase: x / RBC: x / WBC x   Sq Epi: x / Non Sq Epi: x / Bacteria: x        RADIOLOGY & ADDITIONAL TESTS:    Imaging Personally Reviewed:  [x] YES  [ ] NO    Consultant(s) Notes Reviewed:  [x] YES  [ ] NO    MEDICATIONS  (STANDING):  atorvastatin 40 milliGRAM(s) Oral at bedtime  benztropine 1 milliGRAM(s) Oral at bedtime  chlorhexidine 2% Cloths 1 Application(s) Topical daily  dextrose 5%. 1000 milliLiter(s) (100 mL/Hr) IV Continuous <Continuous>  dextrose 5%. 1000 milliLiter(s) (50 mL/Hr) IV Continuous <Continuous>  dextrose 50% Injectable 25 Gram(s) IV Push once  dextrose 50% Injectable 12.5 Gram(s) IV Push once  dextrose 50% Injectable 25 Gram(s) IV Push once  enoxaparin Injectable 40 milliGRAM(s) SubCutaneous every 24 hours  escitalopram 10 milliGRAM(s) Oral daily  glucagon  Injectable 1 milliGRAM(s) IntraMuscular once  haloperidol     Tablet 5 milliGRAM(s) Oral at bedtime  levothyroxine 50 MICROGram(s) Oral daily  mupirocin 2% Ointment 1 Application(s) Topical two times a day    MEDICATIONS  (PRN):  acetaminophen     Tablet .. 650 milliGRAM(s) Oral every 6 hours PRN Temp greater or equal to 38C (100.4F), Mild Pain (1 - 3)  dextrose Oral Gel 15 Gram(s) Oral once PRN Blood Glucose LESS THAN 70 milliGRAM(s)/deciliter  melatonin 3 milliGRAM(s) Oral at bedtime PRN Insomnia      Care Discussed with Consultants/Other Providers [x] YES  [ ] NO    Vital Signs Last 24 Hrs  T(C): 36.7 (04 Sep 2024 14:00), Max: 36.7 (03 Sep 2024 22:48)  T(F): 98 (04 Sep 2024 14:00), Max: 98.1 (03 Sep 2024 22:48)  HR: 59 (04 Sep 2024 14:00) (59 - 68)  BP: 147/77 (04 Sep 2024 14:00) (134/64 - 158/71)  BP(mean): --  RR: 18 (04 Sep 2024 14:00) (18 - 18)  SpO2: 97% (04 Sep 2024 14:00) (96% - 100%)    Parameters below as of 04 Sep 2024 14:00  Patient On (Oxygen Delivery Method): room air      I&O's Summary    04 Sep 2024 07:01  -  04 Sep 2024 16:23  --------------------------------------------------------  IN: 597 mL / OUT: 1 mL / NET: 596 mL      PHYSICAL EXAM:  GENERAL: NAD, well-developed, comfortable  HEAD:  Atraumatic, Normocephalic, open small boil back of the head. no bleeding. no pus. no erythema. dressing d/c/i  EYES: EOMI, PERRLA, conjunctiva and sclera clear  NECK: Supple, No JVD  CHEST/LUNG: mild decrease breath sounds bilaterally; No wheeze   HEART: Regular rate and rhythm; No murmurs, rubs, or gallops  ABDOMEN: Soft, Nontender, Nondistended; Bowel sounds present  Neuro: AAOx3, no focal weakness, 5/5 b/l extremity strength  EXTREMITIES:  2+ Peripheral Pulses, No clubbing, cyanosis, or edema  SKIN: No rashes or lesions   
SUBJECTIVE/ OVERNIGHT EVENTS:  he feels well  no cp, no sob, no n/v/d. no abdominal pain.  no headache, no dizziness.   Pt denied SI/HI ideations, denied visual and auditory hallucinations.   no fever, no chills      --------------------------------------------------------------------------------------------  LABS:                        11.4   6.75  )-----------( 175      ( 05 Sep 2024 05:39 )             33.8     09-05    135  |  97<L>  |  9   ----------------------------<  92  4.1   |  24  |  0.90    Ca    9.2      05 Sep 2024 05:39  Phos  3.3     09-05  Mg     2.10     09-05        CAPILLARY BLOOD GLUCOSE      POCT Blood Glucose.: 128 mg/dL (05 Sep 2024 11:34)  POCT Blood Glucose.: 113 mg/dL (05 Sep 2024 07:40)  POCT Blood Glucose.: 146 mg/dL (04 Sep 2024 16:47)        Urinalysis Basic - ( 05 Sep 2024 05:39 )    Color: x / Appearance: x / SG: x / pH: x  Gluc: 92 mg/dL / Ketone: x  / Bili: x / Urobili: x   Blood: x / Protein: x / Nitrite: x   Leuk Esterase: x / RBC: x / WBC x   Sq Epi: x / Non Sq Epi: x / Bacteria: x        RADIOLOGY & ADDITIONAL TESTS:    Imaging Personally Reviewed:  [x] YES  [ ] NO    Consultant(s) Notes Reviewed:  [x] YES  [ ] NO    MEDICATIONS  (STANDING):  atorvastatin 40 milliGRAM(s) Oral at bedtime  benztropine 1 milliGRAM(s) Oral two times a day  chlorhexidine 2% Cloths 1 Application(s) Topical daily  dextrose 5%. 1000 milliLiter(s) (100 mL/Hr) IV Continuous <Continuous>  dextrose 5%. 1000 milliLiter(s) (50 mL/Hr) IV Continuous <Continuous>  dextrose 50% Injectable 25 Gram(s) IV Push once  dextrose 50% Injectable 12.5 Gram(s) IV Push once  dextrose 50% Injectable 25 Gram(s) IV Push once  enoxaparin Injectable 40 milliGRAM(s) SubCutaneous every 24 hours  escitalopram 10 milliGRAM(s) Oral daily  glucagon  Injectable 1 milliGRAM(s) IntraMuscular once  haloperidol     Tablet 5 milliGRAM(s) Oral at bedtime  levothyroxine 50 MICROGram(s) Oral daily  mupirocin 2% Ointment 1 Application(s) Topical two times a day    MEDICATIONS  (PRN):  acetaminophen     Tablet .. 650 milliGRAM(s) Oral every 6 hours PRN Temp greater or equal to 38C (100.4F), Mild Pain (1 - 3)  dextrose Oral Gel 15 Gram(s) Oral once PRN Blood Glucose LESS THAN 70 milliGRAM(s)/deciliter  melatonin 3 milliGRAM(s) Oral at bedtime PRN Insomnia      Care Discussed with Consultants/Other Providers [x] YES  [ ] NO    Vital Signs Last 24 Hrs  T(C): 36.6 (05 Sep 2024 12:00), Max: 37.1 (04 Sep 2024 21:10)  T(F): 97.9 (05 Sep 2024 12:00), Max: 98.7 (04 Sep 2024 21:10)  HR: 84 (05 Sep 2024 12:00) (65 - 84)  BP: 125/71 (05 Sep 2024 12:00) (125/71 - 151/86)  BP(mean): --  RR: 17 (05 Sep 2024 12:00) (17 - 17)  SpO2: 98% (05 Sep 2024 12:00) (98% - 99%)    Parameters below as of 05 Sep 2024 12:00  Patient On (Oxygen Delivery Method): room air      I&O's Summary    04 Sep 2024 07:01  -  05 Sep 2024 07:00  --------------------------------------------------------  IN: 1057 mL / OUT: 3 mL / NET: 1054 mL    05 Sep 2024 07:01  -  05 Sep 2024 14:27  --------------------------------------------------------  IN: 560 mL / OUT: 0 mL / NET: 560 mL        PHYSICAL EXAM:  GENERAL: NAD, well-developed, comfortable  HEAD:  Atraumatic, Normocephalic, open small boil back of the head. no bleeding. no pus. no erythema. dressing d/c/i  EYES: EOMI, PERRLA, conjunctiva and sclera clear  NECK: Supple, No JVD  CHEST/LUNG: mild decrease breath sounds bilaterally; No wheeze   HEART: Regular rate and rhythm; No murmurs, rubs, or gallops  ABDOMEN: Soft, Nontender, Nondistended; Bowel sounds present  Neuro: AAOx3, no focal weakness, 5/5 b/l extremity strength  EXTREMITIES:  2+ Peripheral Pulses, No clubbing, cyanosis, or edema  SKIN: No rashes or lesions   
Tolerating diet  No N/V  No abd pain    Vital Signs Last 24 Hrs  T(C): 36.4 (09 Sep 2024 11:05), Max: 36.6 (08 Sep 2024 22:00)  T(F): 97.5 (09 Sep 2024 11:05), Max: 97.9 (08 Sep 2024 22:00)  HR: 59 (09 Sep 2024 11:05) (50 - 59)  BP: 130/85 (09 Sep 2024 11:05) (125/78 - 133/79)  BP(mean): 92 (09 Sep 2024 05:00) (92 - 101)  RR: 17 (09 Sep 2024 11:05) (17 - 18)  SpO2: 100% (09 Sep 2024 11:05) (96% - 100%)    I&O's Summary    09-08-24 @ 07:01  -  09-09-24 @ 07:00  --------------------------------------------------------  IN: 240 mL / OUT: 0 mL / NET: 240 mL    09-09-24 @ 07:01  -  09-09-24 @ 15:50  --------------------------------------------------------  IN: 160 mL / OUT: 0 mL / NET: 160 mL        PHYSICAL EXAM:  GENERAL: NAD, well-developed, comfortable  HEAD:  Atraumatic, Normocephalic, occipital abscess 2 x 2 cm already open and draining pus  EYES: EOMI, PERRLA, conjunctiva and sclera clear  NECK: Supple, No JVD  CHEST/LUNG: mild decrease breath sounds bilaterally; No wheeze   HEART: Regular rate and rhythm; No murmurs, rubs, or gallops  ABDOMEN: Soft, Nontender, Nondistended; Bowel sounds present  Neuro: AAOx3, no focal weakness, 5/5 b/l extremity strength  EXTREMITIES:  2+ Peripheral Pulses, No clubbing, cyanosis, or edema        LABS:            CAPILLARY BLOOD GLUCOSE      POCT Blood Glucose.: 107 mg/dL (09 Sep 2024 10:19)  POCT Blood Glucose.: 187 mg/dL (09 Sep 2024 07:16)  POCT Blood Glucose.: 118 mg/dL (08 Sep 2024 16:13)            RADIOLOGY & ADDITIONAL TESTS:    Imaging Personally Reviewed:  [x] YES  [ ] NO    Case discussed with NPP:  [X] YES  [ ] NO  
No acute pain  No SOB or CP    Vital Signs Last 24 Hrs  T(C): 36.9 (08 Sep 2024 05:00), Max: 37.4 (07 Sep 2024 21:53)  T(F): 98.4 (08 Sep 2024 05:00), Max: 99.3 (07 Sep 2024 21:53)  HR: 62 (08 Sep 2024 05:00) (62 - 64)  BP: 131/76 (08 Sep 2024 05:00) (128/75 - 146/90)  BP(mean): --  RR: 17 (08 Sep 2024 05:00) (17 - 18)  SpO2: 99% (08 Sep 2024 05:00) (98% - 99%)    I&O's Summary    09-07-24 @ 07:01  -  09-08-24 @ 07:00  --------------------------------------------------------  IN: 2180 mL / OUT: 0 mL / NET: 2180 mL        PHYSICAL EXAM:  GENERAL: NAD, well-developed, comfortable  HEAD:  Atraumatic, Normocephalic, occipital abscess 2 x 2 cm already open and draining pus  EYES: EOMI, PERRLA, conjunctiva and sclera clear  NECK: Supple, No JVD  CHEST/LUNG: mild decrease breath sounds bilaterally; No wheeze   HEART: Regular rate and rhythm; No murmurs, rubs, or gallops  ABDOMEN: Soft, Nontender, Nondistended; Bowel sounds present  Neuro: AAOx3, no focal weakness, 5/5 b/l extremity strength  EXTREMITIES:  2+ Peripheral Pulses, No clubbing, cyanosis, or edema    LABS:            CAPILLARY BLOOD GLUCOSE      POCT Blood Glucose.: 110 mg/dL (08 Sep 2024 07:19)  POCT Blood Glucose.: 111 mg/dL (07 Sep 2024 16:43)  POCT Blood Glucose.: 97 mg/dL (07 Sep 2024 10:57)            RADIOLOGY & ADDITIONAL TESTS:    Imaging Personally Reviewed:  [x] YES  [ ] NO    Case discussed with NPP:  [X] YES  [ ] NO  
SUBJECTIVE/ OVERNIGHT EVENTS:  feels well.   pain controlled.   await [facility] bed  PO Doxy started by ID.   no cp, no sob, no n/v/d. no abdominal pain.  no headache, no dizziness.     --------------------------------------------------------------------------------------------  LABS:                        11.4   6.75  )-----------( 175      ( 05 Sep 2024 05:39 )             33.8     09-05    135  |  97<L>  |  9   ----------------------------<  92  4.1   |  24  |  0.90    Ca    9.2      05 Sep 2024 05:39  Phos  3.3     09-05  Mg     2.10     09-05        CAPILLARY BLOOD GLUCOSE      POCT Blood Glucose.: 111 mg/dL (06 Sep 2024 11:45)  POCT Blood Glucose.: 108 mg/dL (06 Sep 2024 08:04)  POCT Blood Glucose.: 99 mg/dL (05 Sep 2024 20:45)  POCT Blood Glucose.: 120 mg/dL (05 Sep 2024 16:36)        Urinalysis Basic - ( 05 Sep 2024 05:39 )    Color: x / Appearance: x / SG: x / pH: x  Gluc: 92 mg/dL / Ketone: x  / Bili: x / Urobili: x   Blood: x / Protein: x / Nitrite: x   Leuk Esterase: x / RBC: x / WBC x   Sq Epi: x / Non Sq Epi: x / Bacteria: x        RADIOLOGY & ADDITIONAL TESTS:    Imaging Personally Reviewed:  [x] YES  [ ] NO    Consultant(s) Notes Reviewed:  [x] YES  [ ] NO    MEDICATIONS  (STANDING):  atorvastatin 40 milliGRAM(s) Oral at bedtime  benztropine 1 milliGRAM(s) Oral two times a day  chlorhexidine 2% Cloths 1 Application(s) Topical daily  dextrose 5%. 1000 milliLiter(s) (100 mL/Hr) IV Continuous <Continuous>  dextrose 5%. 1000 milliLiter(s) (50 mL/Hr) IV Continuous <Continuous>  dextrose 50% Injectable 25 Gram(s) IV Push once  dextrose 50% Injectable 12.5 Gram(s) IV Push once  dextrose 50% Injectable 25 Gram(s) IV Push once  doxycycline monohydrate Capsule 100 milliGRAM(s) Oral every 12 hours  enoxaparin Injectable 40 milliGRAM(s) SubCutaneous every 24 hours  escitalopram 10 milliGRAM(s) Oral daily  glucagon  Injectable 1 milliGRAM(s) IntraMuscular once  haloperidol     Tablet 5 milliGRAM(s) Oral at bedtime  levothyroxine 50 MICROGram(s) Oral daily  mupirocin 2% Ointment 1 Application(s) Topical two times a day    MEDICATIONS  (PRN):  acetaminophen     Tablet .. 650 milliGRAM(s) Oral every 6 hours PRN Temp greater or equal to 38C (100.4F), Mild Pain (1 - 3)  dextrose Oral Gel 15 Gram(s) Oral once PRN Blood Glucose LESS THAN 70 milliGRAM(s)/deciliter  melatonin 3 milliGRAM(s) Oral at bedtime PRN Insomnia      Care Discussed with Consultants/Other Providers [x] YES  [ ] NO    Vital Signs Last 24 Hrs  T(C): 36.4 (06 Sep 2024 12:17), Max: 37.1 (06 Sep 2024 05:10)  T(F): 97.6 (06 Sep 2024 12:17), Max: 98.7 (06 Sep 2024 05:10)  HR: 60 (06 Sep 2024 12:17) (60 - 69)  BP: 160/87 (06 Sep 2024 12:17) (139/82 - 166/91)  BP(mean): --  RR: 18 (06 Sep 2024 12:17) (17 - 18)  SpO2: 97% (06 Sep 2024 12:17) (97% - 100%)    Parameters below as of 06 Sep 2024 12:17  Patient On (Oxygen Delivery Method): room air      I&O's Summary    05 Sep 2024 07:01  -  06 Sep 2024 07:00  --------------------------------------------------------  IN: 1060 mL / OUT: 0 mL / NET: 1060 mL      PHYSICAL EXAM:  GENERAL: NAD, well-developed, comfortable  HEAD:  Atraumatic, Normocephalic, open small boil back of the head. no bleeding. no pus. no erythema. dressing d/c/i  EYES: EOMI, PERRLA, conjunctiva and sclera clear  NECK: Supple, No JVD  CHEST/LUNG: mild decrease breath sounds bilaterally; No wheeze   HEART: Regular rate and rhythm; No murmurs, rubs, or gallops  ABDOMEN: Soft, Nontender, Nondistended; Bowel sounds present  Neuro: AAOx3, no focal weakness, 5/5 b/l extremity strength  EXTREMITIES:  2+ Peripheral Pulses, No clubbing, cyanosis, or edema  SKIN: No rashes or lesions   
No acute pain  No SOB or CP    Vital Signs Last 24 Hrs  T(C): 37.1 (07 Sep 2024 05:20), Max: 37.1 (07 Sep 2024 05:20)  T(F): 98.8 (07 Sep 2024 05:20), Max: 98.8 (07 Sep 2024 05:20)  HR: 64 (07 Sep 2024 05:20) (60 - 74)  BP: 127/81 (07 Sep 2024 05:20) (127/81 - 160/92)  BP(mean): --  RR: 18 (07 Sep 2024 05:20) (17 - 18)  SpO2: 99% (07 Sep 2024 05:20) (97% - 100%)    I&O's Summary      PHYSICAL EXAM:  GENERAL: NAD, well-developed, comfortable  HEAD:  Atraumatic, Normocephalic, open small boil back of the head. no bleeding. no pus. no erythema. dressing d/c/i  EYES: EOMI, PERRLA, conjunctiva and sclera clear  NECK: Supple, No JVD  CHEST/LUNG: mild decrease breath sounds bilaterally; No wheeze   HEART: Regular rate and rhythm; No murmurs, rubs, or gallops  ABDOMEN: Soft, Nontender, Nondistended; Bowel sounds present  Neuro: AAOx3, no focal weakness, 5/5 b/l extremity strength  EXTREMITIES:  2+ Peripheral Pulses, No clubbing, cyanosis, or edema    LABS:            CAPILLARY BLOOD GLUCOSE      POCT Blood Glucose.: 110 mg/dL (07 Sep 2024 08:02)  POCT Blood Glucose.: 115 mg/dL (06 Sep 2024 17:02)  POCT Blood Glucose.: 111 mg/dL (06 Sep 2024 11:45)            RADIOLOGY & ADDITIONAL TESTS:    Imaging Personally Reviewed:  [x] YES  [ ] NO    Case discussed with NPP:  [X] YES  [ ] NO  
No acute pain  No HA  No numbness/tingling     Vital Signs Last 24 Hrs  T(C): 36.3 (10 Sep 2024 13:41), Max: 36.8 (10 Sep 2024 02:05)  T(F): 97.4 (10 Sep 2024 13:41), Max: 98.2 (10 Sep 2024 02:05)  HR: 55 (10 Sep 2024 13:41) (50 - 64)  BP: 135/89 (10 Sep 2024 13:41) (123/82 - 153/80)  BP(mean): --  RR: 17 (10 Sep 2024 13:41) (17 - 18)  SpO2: 99% (10 Sep 2024 13:41) (97% - 100%)    I&O's Summary    09-09-24 @ 07:01  -  09-10-24 @ 07:00  --------------------------------------------------------  IN: 2005 mL / OUT: 200 mL / NET: 1805 mL    09-10-24 @ 07:01  -  09-10-24 @ 17:06  --------------------------------------------------------  IN: 520 mL / OUT: 0 mL / NET: 520 mL        PHYSICAL EXAM:  GENERAL: NAD, well-developed, comfortable  HEAD:  Atraumatic, Normocephalic, occipital abscess 2 x 2 cm already open and drainage now minimal  EYES: EOMI, PERRLA, conjunctiva and sclera clear  NECK: Supple, No JVD  CHEST/LUNG: mild decrease breath sounds bilaterally; No wheeze   HEART: Regular rate and rhythm; No murmurs, rubs, or gallops  ABDOMEN: Soft, Nontender, Nondistended; Bowel sounds present  Neuro: AAOx3, no focal weakness, 5/5 b/l extremity strength  EXTREMITIES:  2+ Peripheral Pulses, No clubbing, cyanosis, or edema    LABS:            CAPILLARY BLOOD GLUCOSE      POCT Blood Glucose.: 99 mg/dL (10 Sep 2024 11:41)  POCT Blood Glucose.: 115 mg/dL (10 Sep 2024 07:37)  POCT Blood Glucose.: 127 mg/dL (09 Sep 2024 21:28)            RADIOLOGY & ADDITIONAL TESTS:    Imaging Personally Reviewed:  [x] YES  [ ] NO    Case discussed with NPP:  [X] YES  [ ] NO

## 2024-09-11 NOTE — BH PATIENT PROFILE - FALL HARM RISK - UNIVERSAL INTERVENTIONS
Bed in lowest position, wheels locked, appropriate side rails in place/Call bell, personal items and telephone in reach/Instruct patient to call for assistance before getting out of bed or chair/Non-slip footwear when patient is out of bed/[place] to call system/Physically safe environment - no spills, clutter or unnecessary equipment/Purposeful Proactive Rounding/Room/bathroom lighting operational, light cord in reach

## 2024-09-11 NOTE — BH INPATIENT PSYCHIATRY ASSESSMENT NOTE - NSBHCHARTREVIEWVS_PSY_A_CORE FT
Vital Signs Last 24 Hrs  T(C): 36.6 (09-11-24 @ 11:00), Max: 36.7 (09-10-24 @ 17:10)  T(F): 97.8 (09-11-24 @ 11:00), Max: 98 (09-10-24 @ 17:10)  HR: 60 (09-11-24 @ 11:00) (53 - 61)  BP: 137/77 (09-11-24 @ 11:00) (116/75 - 142/88)  BP(mean): --  RR: 18 (09-11-24 @ 11:00) (17 - 18)  SpO2: 99% (09-11-24 @ 11:00) (98% - 100%)     Vital Signs Last 24 Hrs  T(C): 36.6 (09-13-24 @ 08:07), Max: 36.6 (09-13-24 @ 08:07)  T(F): 97.9 (09-13-24 @ 08:07), Max: 97.9 (09-13-24 @ 08:07)  HR: --  BP: --  BP(mean): --  RR: 18 (09-13-24 @ 11:56) (18 - 18)  SpO2: --    Orthostatic VS  09-13-24 @ 08:07  Lying BP: --/-- HR: --  Sitting BP: 112/59 HR: 102  Standing BP: 90/50 HR: 89  Site: --  Mode: --  Orthostatic VS  09-12-24 @ 19:14  Lying BP: --/-- HR: --  Sitting BP: 125/72 HR: 60  Standing BP: 110/68 HR: 68  Site: --  Mode: --  Orthostatic VS  09-12-24 @ 08:40  Lying BP: --/-- HR: --  Sitting BP: 124/73 HR: 62  Standing BP: 107/57 HR: 67  Site: upper left arm  Mode: electronic  Orthostatic VS  09-11-24 @ 19:24  Lying BP: --/-- HR: --  Sitting BP: 149/80 HR: 74  Standing BP: 149/74 HR: 83  Site: --  Mode: --  Orthostatic VS  09-11-24 @ 14:40  Lying BP: --/-- HR: --  Sitting BP: 162/67 HR: 66  Standing BP: 144/66 HR: 76  Site: upper right arm  Mode: electronic

## 2024-09-11 NOTE — BH CONSULTATION LIAISON PROGRESS NOTE - NSBHFUPINTERVALHXFT_PSY_A_CORE
Pt seen at bedside, alert and oriented, calm and cooperative. Pt states his mood is good but that he is feeling bored. Reports good sleep, 8 hours and feels safe. Of note, family was at bedside, brought him a health magazine. Pt states he enjoys reading "health books" in his free time. Family would like to discuss logistics and paperwork associated with transfer to [facility]. Denies any alarming thought content, denies AVH/SI/HI.

## 2024-09-11 NOTE — DISCHARGE NOTE NURSING/CASE MANAGEMENT/SOCIAL WORK - NSDCFUADDAPPT_GEN_ALL_CORE_FT
Occipital Scalp: Cleanse with NS, pat dry. Apply Liquid barrier film to periwound skin (allow to dry). Apply Medihoney gel to base of wound, cover with silicone foam with border. Change daily and PRN if soiled.    Continue low air loss bed therapy, continue heel elevation, continue to turn & reposition per protocol, soft pillow between bony prominences, continue moisture management with barrier creams & single breathable pad, continue measures to decrease friction/shear/pressure. Continue with nutritional support as per dietary/orders.    Recommend continuing to follow up care at [facility]: [phone]. Address: [address] [place] [address].
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
## 2024-09-11 NOTE — DISCHARGE NOTE NURSING/CASE MANAGEMENT/SOCIAL WORK - PATIENT PORTAL LINK FT
You can access the FollowMyHealth Patient Portal offered by [facility] by registering at the following website: [email or web address]. By joining [facility]’s FollowMyHealth portal, you will also be able to view your health information using other applications (apps) compatible with our system.

## 2024-09-11 NOTE — BH INPATIENT PSYCHIATRY ASSESSMENT NOTE - CURRENT MEDICATION
MEDICATIONS  (STANDING):    MEDICATIONS  (PRN):   MEDICATIONS  (STANDING):  amLODIPine   Tablet 10 milliGRAM(s) Oral daily  atorvastatin 40 milliGRAM(s) Oral at bedtime  bacitracin   Ointment 1 Application(s) Topical daily  benztropine 1 milliGRAM(s) Oral at bedtime  clindamycin   Capsule 450 milliGRAM(s) Oral every 8 hours  escitalopram 10 milliGRAM(s) Oral daily  haloperidol     Tablet 5 milliGRAM(s) Oral at bedtime  influenza   Vaccine 0.5 milliLiter(s) IntraMuscular once  insulin lispro (ADMELOG) corrective regimen sliding scale   SubCutaneous three times a day before meals  insulin lispro (ADMELOG) corrective regimen sliding scale   SubCutaneous at bedtime  labetalol 200 milliGRAM(s) Oral two times a day  levothyroxine 50 MICROGram(s) Oral daily  metFORMIN 500 milliGRAM(s) Oral two times a day  polyethylene glycol 3350 17 Gram(s) Oral daily  tamsulosin 0.4 milliGRAM(s) Oral at bedtime    MEDICATIONS  (PRN):  acetaminophen     Tablet .. 650 milliGRAM(s) Oral every 6 hours PRN Mild Pain (1 - 3), Moderate Pain (4 - 6)  haloperidol     Tablet 5 milliGRAM(s) Oral every 6 hours PRN agitation  haloperidol    Injectable 5 milliGRAM(s) IntraMuscular once PRN psychotic agitation  hydrOXYzine hydrochloride 50 milliGRAM(s) Oral every 6 hours PRN anxiety  LORazepam     Tablet 2 milliGRAM(s) Oral every 6 hours PRN severe anxiety  LORazepam   Injectable 2 milliGRAM(s) IntraMuscular once PRN psychotic anxiety  melatonin. 3 milliGRAM(s) Oral at bedtime PRN Insomnia

## 2024-09-11 NOTE — BH CONSULTATION LIAISON PROGRESS NOTE - NSBHTIMEACTIVITIESPERFORMED_PSY_A_CORE
Discussed with patient, team, SW, and chart reviewed. Time exclusive of supervision/education. 
Discussed with patient, team, SW, and chart reviewed. Time exclusive of supervision/education. 
Discussed with patient, team, family, SW, and chart reviewed. Time exclusive of supervision/education. 
Discussed with patient, team, SW, and chart reviewed. Time exclusive of supervision/education. 
Discussed with patient, team, SW, and chart reviewed. Time exclusive of supervision/education.

## 2024-09-11 NOTE — BH CONSULTATION LIAISON PROGRESS NOTE - NSBHMSESPEECH_PSY_A_CORE
No
Normal volume, rate, productivity, spontaneity and articulation

## 2024-09-11 NOTE — BH INPATIENT PSYCHIATRY ASSESSMENT NOTE - NSCOMMENTSUICRISKFACT_PSY_ALL_CORE
pt currently denies SI/NSSIB and states that he feels safe on the unit and can come to staff with safety concerns

## 2024-09-11 NOTE — BH CONSULTATION LIAISON PROGRESS NOTE - ADDITIONAL PSYCHIATRIC MEDICATIONS
lexapro  haldol  benztropine

## 2024-09-11 NOTE — BH CONSULTATION LIAISON PROGRESS NOTE - NSICDXBHPRIMARYDX_PSY_ALL_CORE
Suicide attempt by beta blocker overdose   T44.7X2A  

## 2024-09-11 NOTE — BH CONSULTATION LIAISON PROGRESS NOTE - NSBHCHARTREVIEWVS_PSY_A_CORE FT
Vital Signs Last 24 Hrs  T(C): 36.6 (11 Sep 2024 05:30), Max: 36.7 (10 Sep 2024 17:10)  T(F): 97.8 (11 Sep 2024 05:30), Max: 98 (10 Sep 2024 17:10)  HR: 61 (11 Sep 2024 05:30) (53 - 61)  BP: 138/79 (11 Sep 2024 05:30) (116/75 - 142/88)  BP(mean): --  RR: 18 (11 Sep 2024 05:30) (17 - 18)  SpO2: 98% (11 Sep 2024 05:30) (98% - 100%)    Parameters below as of 11 Sep 2024 05:30  Patient On (Oxygen Delivery Method): room air     Vital Signs Last 24 Hrs  T(C): 36.6 (11 Sep 2024 05:30), Max: 36.7 (10 Sep 2024 17:10)  T(F): 97.8 (11 Sep 2024 05:30), Max: 98 (10 Sep 2024 17:10)  HR: 61 (11 Sep 2024 05:30) (53 - 61)  BP: 138/79 (11 Sep 2024 05:30) (116/75 - 142/88)  BP(mean): --  ABP: --  ABP(mean): --  RR: 18 (11 Sep 2024 05:30) (17 - 18)  SpO2: 98% (11 Sep 2024 05:30) (98% - 100%)    O2 Parameters below as of 11 Sep 2024 05:30  Patient On (Oxygen Delivery Method): room air
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
## 2024-09-11 NOTE — PROGRESS NOTE ADULT - PROVIDER SPECIALTY LIST ADULT
Internal Medicine
Infectious Disease
Infectious Disease
Surgery
Infectious Disease
PRE-OP DIAGNOSIS:  Mohs defect of left nose 11-Aug-2021 08:48:16  Stacey Conner L  
Internal Medicine

## 2024-09-11 NOTE — BH CONSULTATION LIAISON PROGRESS NOTE - NSBHASSESSMENTFT_PSY_ALL_CORE
57 yo male w PMHx of schizoaffective disorder (bipolar type with multiple [facility] admissions), HTN, HLD, hypothyroidism, CVID/hypogammaglobulinemia (monthly IVIG, last on 7/23), hx of frequent skin infection (MRSA, abscess/cellulitis w MRSA bacteremia s/p debridement 6/2023) presented to the ED with sisters after appearing unwell admitted for concern for suicide attempt. Psych c/s for SI.     Initial eval: Patient exhibits psychosis and depression, marked features of schizoaffective d/o actively present. Patient currently suicidal and unable to safety plan, will require further optimization of psychiatric medications and psychiatric hospitalization for safety/stabilization.    9/4: Pt reports improved mood, describing it as "excellent". He denies current AVH/SI/HI but admits that his "paranoid delusions" are triggered easily by "guilty" thoughts. Pt will require further assessment and psychiatric hospitalization for safety/stabilization given concern for SI/suicide attempt, no EPS/sfx noted on haldol.   9/5: Pt continues to describe good mood. Denies AVH/SI/HI. Awaiting medical clearance for admission to [facility].  9/6: Pt A&O, calm and cooperative. Pt endorses good mood but admits that his paranoid thoughts and SI are triggered by external stimuli that he cannot predict. Denies current SI/HI/AVH. Denies suicidality/intent or plan and denies homicidality/intent or plan but admits to OD on meds.   9/9: Pt endorses good mood, denies AVH/SI/HI. Appreciate MRSA scalp abscess wound washout 9/8. Awaiting medical clearance for admission to [facility].   9/10: Pt alert, eating breakfast. Reports good mood, denies suicidality/intent or plan, AVH, or HI.     9/11: Pt calm and cooperative, reports good mood. Denies concerning thought content including SI/HI or AVH. Awaiting bed availability at [facility].    Plans:  Continue 1:1 and safety precautions for suicidality  - May need IVIG (would coordinate with sister about this) prior to [facility] transfer as he is due for it on 9/12  -C/w Haldol 5mg qHS PO  -C/w cogentin to 1 mg BID PO  -C/w lexapro 10 mg daily PO  -PRN for agitation Haldol 5mg + Ativan mg q6h PRN PO/IM/IV  -Dispo, inpt psych once bed is available    59 yo male w PMHx of schizoaffective disorder (bipolar type with multiple [facility] admissions), HTN, HLD, hypothyroidism, CVID/hypogammaglobulinemia (monthly IVIG, last on 7/23), hx of frequent skin infection (MRSA, abscess/cellulitis w MRSA bacteremia s/p debridement 6/2023) presented to the ED with sisters after appearing unwell admitted for concern for suicide attempt. Psych c/s for SI.     Initial eval: Patient exhibits psychosis and depression, marked features of schizoaffective d/o actively present. Patient currently suicidal and unable to safety plan, will require further optimization of psychiatric medications and psychiatric hospitalization for safety/stabilization.    9/4: Pt reports improved mood, describing it as "excellent". He denies current AVH/SI/HI but admits that his "paranoid delusions" are triggered easily by "guilty" thoughts. Pt will require further assessment and psychiatric hospitalization for safety/stabilization given concern for SI/suicide attempt, no EPS/sfx noted on haldol.   9/5: Pt continues to describe good mood. Denies AVH/SI/HI. Awaiting medical clearance for admission to [facility].  9/6: Pt A&O, calm and cooperative. Pt endorses good mood but admits that his paranoid thoughts and SI are triggered by external stimuli that he cannot predict. Denies current SI/HI/AVH. Denies suicidality/intent or plan and denies homicidality/intent or plan but admits to OD on meds.   9/9: Pt endorses good mood, denies AVH/SI/HI. Appreciate MRSA scalp abscess wound washout 9/8. Awaiting medical clearance for admission to [facility].   9/10: Pt alert, eating breakfast. Reports good mood, denies suicidality/intent or plan, AVH, or HI.     9/11: Pt calm and cooperative, reports good mood. Denies concerning thought content including SI/HI or AVH. Awaiting bed availability at [facility].    Plans:  Continue 1:1 and safety precautions for suicidality  -C/w Haldol 5mg qHS PO  -C/w cogentin to 1 mg BID PO  -C/w lexapro 10 mg daily PO  -PRN for agitation Haldol 5mg + Ativan mg q6h PRN PO/IM/IV  -Dispo, inpt psych once bed is available

## 2024-09-11 NOTE — BH CONSULTATION LIAISON PROGRESS NOTE - NSBHCONSULTMEDNEW_PSY_A_CORE
no
I will START or STAY ON the medications listed below when I get home from the hospital:    Tylenol Childrens 160 mg/5 mL oral suspension  -- 20 milliliter(s) by mouth every 4 hours, As Needed for pain.   -- Indication: For pain    propranolol 20 mg/5 mL oral solution  --  by mouth 3 times a day  -- Indication: For heart rate    sucralfate 1 g/10 mL oral suspension  -- 10 milliliter(s) by mouth 3 times a day  -- Indication: For GI coating    Vitamin D3  -- 4000 international unit(s) by mouth once a day  -- Indication: For supplement
no

## 2024-09-11 NOTE — PSYCHIATRIC REHAB INITIAL EVALUATION - NSBHSUPNAMERELATIVE_PSY_ALL_CORE
Patient has 3 sisters, all of whom he is close to. Two sisters live near patient's apartment while the third sister lives in [place].

## 2024-09-11 NOTE — BH CONSULTATION LIAISON PROGRESS NOTE - NSBHATTESTBILLING_PSY_A_CORE
26867-Wnwupqlqth OBS or IP - moderate complexity OR 35-49 mins
11751-Pmitlgdedj OBS or IP - moderate complexity OR 35-49 mins
78778-Urhsllzzuc OBS or IP - high complexity OR 50-79 mins
Billing in another system
46781-Yrqvpopixz OBS or IP - moderate complexity OR 35-49 mins
15601-Dpkppuglxp OBS or IP - moderate complexity OR 35-49 mins

## 2024-09-11 NOTE — BH CONSULTATION LIAISON PROGRESS NOTE - CURRENT MEDICATION
MEDICATIONS  (STANDING):  amLODIPine   Tablet 10 milliGRAM(s) Oral daily  atorvastatin 40 milliGRAM(s) Oral at bedtime  benztropine 1 milliGRAM(s) Oral two times a day  chlorhexidine 2% Cloths 1 Application(s) Topical daily  clindamycin   Capsule 450 milliGRAM(s) Oral every 8 hours  dextrose 5%. 1000 milliLiter(s) (50 mL/Hr) IV Continuous <Continuous>  dextrose 5%. 1000 milliLiter(s) (100 mL/Hr) IV Continuous <Continuous>  dextrose 50% Injectable 25 Gram(s) IV Push once  dextrose 50% Injectable 12.5 Gram(s) IV Push once  dextrose 50% Injectable 25 Gram(s) IV Push once  enoxaparin Injectable 40 milliGRAM(s) SubCutaneous every 24 hours  escitalopram 10 milliGRAM(s) Oral daily  glucagon  Injectable 1 milliGRAM(s) IntraMuscular once  haloperidol     Tablet 5 milliGRAM(s) Oral at bedtime  labetalol 200 milliGRAM(s) Oral two times a day  levothyroxine 50 MICROGram(s) Oral daily  mupirocin 2% Ointment 1 Application(s) Topical two times a day    MEDICATIONS  (PRN):  acetaminophen     Tablet .. 650 milliGRAM(s) Oral every 6 hours PRN Temp greater or equal to 38C (100.4F), Mild Pain (1 - 3)  dextrose Oral Gel 15 Gram(s) Oral once PRN Blood Glucose LESS THAN 70 milliGRAM(s)/deciliter  melatonin 3 milliGRAM(s) Oral at bedtime PRN Insomnia  polyethylene glycol 3350 17 Gram(s) Oral daily PRN Constipation  
MEDICATIONS  (STANDING):  amLODIPine   Tablet 10 milliGRAM(s) Oral daily  atorvastatin 40 milliGRAM(s) Oral at bedtime  benztropine 1 milliGRAM(s) Oral two times a day  chlorhexidine 2% Cloths 1 Application(s) Topical daily  dextrose 5%. 1000 milliLiter(s) (50 mL/Hr) IV Continuous <Continuous>  dextrose 5%. 1000 milliLiter(s) (100 mL/Hr) IV Continuous <Continuous>  dextrose 50% Injectable 25 Gram(s) IV Push once  dextrose 50% Injectable 12.5 Gram(s) IV Push once  dextrose 50% Injectable 25 Gram(s) IV Push once  enoxaparin Injectable 40 milliGRAM(s) SubCutaneous every 24 hours  escitalopram 10 milliGRAM(s) Oral daily  glucagon  Injectable 1 milliGRAM(s) IntraMuscular once  haloperidol     Tablet 5 milliGRAM(s) Oral at bedtime  immune  globulin 10% (GAMMAGARD S/D) IVPB 70 Gram(s) IV Intermittent once  labetalol 200 milliGRAM(s) Oral two times a day  levothyroxine 50 MICROGram(s) Oral daily  mupirocin 2% Ointment 1 Application(s) Topical two times a day  vancomycin  IVPB 1500 milliGRAM(s) IV Intermittent every 12 hours    MEDICATIONS  (PRN):  acetaminophen     Tablet .. 650 milliGRAM(s) Oral every 6 hours PRN Temp greater or equal to 38C (100.4F), Mild Pain (1 - 3)  dextrose Oral Gel 15 Gram(s) Oral once PRN Blood Glucose LESS THAN 70 milliGRAM(s)/deciliter  melatonin 3 milliGRAM(s) Oral at bedtime PRN Insomnia  
MEDICATIONS  (STANDING):  atorvastatin 40 milliGRAM(s) Oral at bedtime  benztropine 1 milliGRAM(s) Oral at bedtime  dextrose 5%. 1000 milliLiter(s) (100 mL/Hr) IV Continuous <Continuous>  dextrose 5%. 1000 milliLiter(s) (50 mL/Hr) IV Continuous <Continuous>  dextrose 50% Injectable 25 Gram(s) IV Push once  dextrose 50% Injectable 12.5 Gram(s) IV Push once  dextrose 50% Injectable 25 Gram(s) IV Push once  enoxaparin Injectable 40 milliGRAM(s) SubCutaneous every 24 hours  escitalopram 10 milliGRAM(s) Oral daily  glucagon  Injectable 1 milliGRAM(s) IntraMuscular once  haloperidol     Tablet 5 milliGRAM(s) Oral at bedtime  levothyroxine 50 MICROGram(s) Oral daily    MEDICATIONS  (PRN):  acetaminophen     Tablet .. 650 milliGRAM(s) Oral every 6 hours PRN Temp greater or equal to 38C (100.4F), Mild Pain (1 - 3)  dextrose Oral Gel 15 Gram(s) Oral once PRN Blood Glucose LESS THAN 70 milliGRAM(s)/deciliter  melatonin 3 milliGRAM(s) Oral at bedtime PRN Insomnia  
MEDICATIONS  (STANDING):  atorvastatin 40 milliGRAM(s) Oral at bedtime  benztropine 1 milliGRAM(s) Oral two times a day  chlorhexidine 2% Cloths 1 Application(s) Topical daily  dextrose 5%. 1000 milliLiter(s) (100 mL/Hr) IV Continuous <Continuous>  dextrose 5%. 1000 milliLiter(s) (50 mL/Hr) IV Continuous <Continuous>  dextrose 50% Injectable 25 Gram(s) IV Push once  dextrose 50% Injectable 12.5 Gram(s) IV Push once  dextrose 50% Injectable 25 Gram(s) IV Push once  doxycycline monohydrate Capsule 100 milliGRAM(s) Oral every 12 hours  enoxaparin Injectable 40 milliGRAM(s) SubCutaneous every 24 hours  escitalopram 10 milliGRAM(s) Oral daily  glucagon  Injectable 1 milliGRAM(s) IntraMuscular once  haloperidol     Tablet 5 milliGRAM(s) Oral at bedtime  levothyroxine 50 MICROGram(s) Oral daily  mupirocin 2% Ointment 1 Application(s) Topical two times a day    MEDICATIONS  (PRN):  acetaminophen     Tablet .. 650 milliGRAM(s) Oral every 6 hours PRN Temp greater or equal to 38C (100.4F), Mild Pain (1 - 3)  dextrose Oral Gel 15 Gram(s) Oral once PRN Blood Glucose LESS THAN 70 milliGRAM(s)/deciliter  melatonin 3 milliGRAM(s) Oral at bedtime PRN Insomnia  
MEDICATIONS  (STANDING):  amLODIPine   Tablet 10 milliGRAM(s) Oral daily  atorvastatin 40 milliGRAM(s) Oral at bedtime  benztropine 1 milliGRAM(s) Oral two times a day  chlorhexidine 2% Cloths 1 Application(s) Topical daily  clindamycin   Capsule 450 milliGRAM(s) Oral every 8 hours  dextrose 5%. 1000 milliLiter(s) (50 mL/Hr) IV Continuous <Continuous>  dextrose 5%. 1000 milliLiter(s) (100 mL/Hr) IV Continuous <Continuous>  dextrose 50% Injectable 25 Gram(s) IV Push once  dextrose 50% Injectable 25 Gram(s) IV Push once  dextrose 50% Injectable 12.5 Gram(s) IV Push once  enoxaparin Injectable 40 milliGRAM(s) SubCutaneous every 24 hours  escitalopram 10 milliGRAM(s) Oral daily  glucagon  Injectable 1 milliGRAM(s) IntraMuscular once  haloperidol     Tablet 5 milliGRAM(s) Oral at bedtime  labetalol 200 milliGRAM(s) Oral two times a day  levothyroxine 50 MICROGram(s) Oral daily  mupirocin 2% Ointment 1 Application(s) Topical two times a day    MEDICATIONS  (PRN):  acetaminophen     Tablet .. 650 milliGRAM(s) Oral every 6 hours PRN Temp greater or equal to 38C (100.4F), Mild Pain (1 - 3)  dextrose Oral Gel 15 Gram(s) Oral once PRN Blood Glucose LESS THAN 70 milliGRAM(s)/deciliter  melatonin 3 milliGRAM(s) Oral at bedtime PRN Insomnia  polyethylene glycol 3350 17 Gram(s) Oral daily PRN Constipation  
MEDICATIONS  (STANDING):  atorvastatin 40 milliGRAM(s) Oral at bedtime  benztropine 1 milliGRAM(s) Oral two times a day  chlorhexidine 2% Cloths 1 Application(s) Topical daily  dextrose 5%. 1000 milliLiter(s) (100 mL/Hr) IV Continuous <Continuous>  dextrose 5%. 1000 milliLiter(s) (50 mL/Hr) IV Continuous <Continuous>  dextrose 50% Injectable 25 Gram(s) IV Push once  dextrose 50% Injectable 12.5 Gram(s) IV Push once  dextrose 50% Injectable 25 Gram(s) IV Push once  enoxaparin Injectable 40 milliGRAM(s) SubCutaneous every 24 hours  escitalopram 10 milliGRAM(s) Oral daily  glucagon  Injectable 1 milliGRAM(s) IntraMuscular once  haloperidol     Tablet 5 milliGRAM(s) Oral at bedtime  levothyroxine 50 MICROGram(s) Oral daily  mupirocin 2% Ointment 1 Application(s) Topical two times a day    MEDICATIONS  (PRN):  acetaminophen     Tablet .. 650 milliGRAM(s) Oral every 6 hours PRN Temp greater or equal to 38C (100.4F), Mild Pain (1 - 3)  dextrose Oral Gel 15 Gram(s) Oral once PRN Blood Glucose LESS THAN 70 milliGRAM(s)/deciliter  melatonin 3 milliGRAM(s) Oral at bedtime PRN Insomnia

## 2024-09-11 NOTE — BH CONSULTATION LIAISON PROGRESS NOTE - NSBHFUPINTERVALCCFT_PSY_A_CORE
"My mood is good"
"My mood is improved"
"I'm feeling fine"
"I feel bored"
"I'm feeling much better"
"My mood is improved"

## 2024-09-11 NOTE — PROGRESS NOTE ADULT - REASON FOR ADMISSION
labetalol overdose

## 2024-09-11 NOTE — BH CONSULTATION LIAISON PROGRESS NOTE - NSBHPTASSESSDT_PSY_A_CORE
10-Sep-2024 10:16
09-Sep-2024 08:51
05-Sep-2024 09:13
11-Sep-2024 10:28
04-Sep-2024 08:26
06-Sep-2024 08:52

## 2024-09-11 NOTE — BH CONSULTATION LIAISON PROGRESS NOTE - NSBHATTESTCOMMENTATTENDFT_PSY_A_CORE
Agree with above - patient calm, admits to intentional overdose due to SI secondary to psychosis, no current AH/VH noted, no EPS on exam, denies SI but some low insight into severity of actions, needs psych admission given SA. 9.13 in chart.     Discussed with family - patient due for IVIG (gets Gammaglobulin of a particular brand as he has had bad reactions to other IVIG in the past), may need to get this prior to transfer to [facility] 
Agree with above, modified HPI/and AP - patient calm, cooperative, understands he will need [facility] admission though somewhat ambivalent about this. Admits to intentional OD on his "Beta channel blockers" in order to kill himself though does not have SI currently. C/w 1:1, awaiting psych hospitalization once able. 
agree with above, changed body of note - patient's mood improved and patient state some delusions/AH have abated with increase in haldol, no si/hi currently but understands he will need further psych eval and/or [facility] rehospitalization given his SI prior and intentional overdose prior to hospitalization. C/w meds as above, would increase congentin for EPS ppx given higher haldol dose. Awaiting med clearance and [facility] transfer 
agree with above, patient still amenable to [facility], still poor insight into dangers of prior SI. Denies suicidality/intent or plan and denies homicidality/intent or plan currently. Psych will follow, 9.13 in chart. 
agree with above, patient denies current SI/HI, denies AH/VH  currently but admits to prior suicide attempt, 9.13 signed. Discussed with family who are advocating for admission as well, handoff sent to [facility]. C/w meds as ordered.

## 2024-09-11 NOTE — BH INPATIENT PSYCHIATRY ASSESSMENT NOTE - NSBHMETABOLIC_PSY_ALL_CORE_FT
BMI: BMI (kg/m2): 31.6 (09-02-24 @ 19:48)  HbA1c: A1C with Estimated Average Glucose Result: 5.8 % (07-24-24 @ 07:48)    Glucose: POCT Blood Glucose.: 102 mg/dL (09-11-24 @ 11:04)    BP: --Vital Signs Last 24 Hrs  T(C): 36.6 (09-11-24 @ 11:00), Max: 36.7 (09-10-24 @ 17:10)  T(F): 97.8 (09-11-24 @ 11:00), Max: 98 (09-10-24 @ 17:10)  HR: 60 (09-11-24 @ 11:00) (53 - 61)  BP: 137/77 (09-11-24 @ 11:00) (116/75 - 142/88)  BP(mean): --  RR: 18 (09-11-24 @ 11:00) (17 - 18)  SpO2: 99% (09-11-24 @ 11:00) (98% - 100%)      Lipid Panel: Date/Time: 07-24-24 @ 07:48  Cholesterol, Serum: 81  LDL Cholesterol Calculated: 34  HDL Cholesterol, Serum: 30  Total Cholesterol/HDL Ration Measurement: --  Triglycerides, Serum: 84

## 2024-09-11 NOTE — BH CONSULTATION LIAISON PROGRESS NOTE - NSBHINDICATION_PSY_ALL_CORE
Risk of suicide 

## 2024-09-11 NOTE — BH INPATIENT PSYCHIATRY ASSESSMENT NOTE - NSBHASSESSSUMMFT_PSY_ALL_CORE
57 yo male w PMHx of schizoaffective disorder (bipolar type with multiple [facility] admissions), HTN, HLD, hypothyroidism, CVID/hypogammaglobulinemia (monthly IVIG, last on 7/23 or 9/9), hx of frequent skin infection (MRSA, abscess/cellulitis w MRSA bacteremia s/p debridement 6/2023) presented to the ED with sisters after appearing unwell admitted for concern for suicide attempt. Psych c/s for SI.     pt presents with chronic anxiety and depression along with paranoia, all which pt has struggled with for many years despite treatment. Chart reviewed regarding medication reconciliation. Wound consult ordered for continued assessment and care.    Treatment Plan  1. admitted to unit, legal status   2. safety  - routine checks as pt denies SIIP/HIIP and is able to contract for safety  - haldol and ativan PO/IM PRN for agitation and anxiety  3. psychiatric  - c/w haldol 5mg for psychosis  - haldol dec 200mg Q3w last given 8/28, next due 9/18  - c/w lexapro 10mg  - past trials: abilify (worsening paranoia)  4. medical  - hypothyroidism: levothyroxine 50mcg  - HLD: lipitor 40mg QHS  - HTN: labetalol 200mg BID, amlodipine 10mg   - DM2: metformin 500mg BID  - MRSA: s/p scalp I/D on 9/8 with recommendations from C/L   	- c/w clindamycin 450mg PO every 8 hours x 5 days (started 9/10/23)  	- mupirocin to b/l nares x 5 days  	- after discharge can attempt MRSA decolonization w/ hibaclens bath 3x per week x 2 weeks   	-f/u pathology from wound  	- no objection to discharge from ID perspective  	- wound consult ordered at [facility] 9/11  - CVID: monthly IVIG (typically Gammagard S/D formula); last dose given 9/9/23 (follows with Dr. Mitchell Boxer)  5. encourage I/G/M therapy  6. dispo  - wound care: [facility]: [phone]. Address: [address] [place] [address]  - psychiatry: Dr Hollie Cook with [facility]

## 2024-09-11 NOTE — BH PATIENT PROFILE - HOME MEDICATIONS
clindamycin 150 mg oral capsule , 3 cap(s) orally every 8 hours LAST DAY 9/15/24  haloperidol 5 mg oral tablet , 1 tab(s) orally once a day (at bedtime)  Synthroid 50 mcg (0.05 mg) oral tablet , 1 tab(s) orally once a day  melatonin 3 mg oral tablet , 1 tab(s) orally once a day (at bedtime) As needed Insomnia  polyethylene glycol 3350 oral powder for reconstitution , 17 gram(s) orally once a day As needed constipation  Norvasc 10 mg oral tablet , 1 tab(s) orally once a day  labetalol 200 mg oral tablet , 1 tab(s) orally 2 times a day  benztropine 1 mg oral tablet , 1 tab(s) orally once a day (at bedtime)  Lipitor 40 mg oral tablet , 1 tab(s) orally once a day (at bedtime)  metFORMIN 500 mg oral tablet , 1 tab(s) orally 2 times a day  Lexapro 10 mg oral tablet , 1 tab(s) orally once a day  Flomax 0.4 mg oral capsule , 1 cap(s) orally once a day (at bedtime)

## 2024-09-11 NOTE — BH CONSULTATION LIAISON PROGRESS NOTE - NSBHATTESTTYPEVISIT_PSY_A_CORE
Attending with Resident/Fellow/Student
ANA without on-site Attending supervision

## 2024-09-11 NOTE — BH INPATIENT PSYCHIATRY ASSESSMENT NOTE - NSICDXBHSECONDARYDX_PSY_ALL_CORE
HTN (hypertension)   I10  HLD (hyperlipidemia)   E78.5  Hypothyroidism   E03.9  CVID (common variable immunodeficiency)   D83.9

## 2024-09-11 NOTE — BH INPATIENT PSYCHIATRY ASSESSMENT NOTE - RISK ASSESSMENT
pt at chronic risk for SA due to multiple past SA via OD and SI requiring inpatient hospitalizations

## 2024-09-12 ENCOUNTER — APPOINTMENT (OUTPATIENT)
Dept: RHEUMATOLOGY | Facility: CLINIC | Age: 59
End: 2024-09-12

## 2024-09-12 LAB
A1C WITH ESTIMATED AVERAGE GLUCOSE RESULT: 5.4 % — SIGNIFICANT CHANGE UP (ref 4–5.6)
ALBUMIN SERPL ELPH-MCNC: 4 G/DL — SIGNIFICANT CHANGE UP (ref 3.3–5)
ALP SERPL-CCNC: 120 U/L — SIGNIFICANT CHANGE UP (ref 40–120)
ALT FLD-CCNC: 19 U/L — SIGNIFICANT CHANGE UP (ref 4–41)
ANION GAP SERPL CALC-SCNC: 15 MMOL/L — HIGH (ref 7–14)
AST SERPL-CCNC: 20 U/L — SIGNIFICANT CHANGE UP (ref 4–40)
BASOPHILS # BLD AUTO: 0.02 K/UL — SIGNIFICANT CHANGE UP (ref 0–0.2)
BASOPHILS NFR BLD AUTO: 0.6 % — SIGNIFICANT CHANGE UP (ref 0–2)
BILIRUB SERPL-MCNC: 0.4 MG/DL — SIGNIFICANT CHANGE UP (ref 0.2–1.2)
BUN SERPL-MCNC: 14 MG/DL — SIGNIFICANT CHANGE UP (ref 7–23)
CALCIUM SERPL-MCNC: 9.3 MG/DL — SIGNIFICANT CHANGE UP (ref 8.4–10.5)
CHLORIDE SERPL-SCNC: 93 MMOL/L — LOW (ref 98–107)
CHOLEST SERPL-MCNC: 74 MG/DL — SIGNIFICANT CHANGE UP
CO2 SERPL-SCNC: 22 MMOL/L — SIGNIFICANT CHANGE UP (ref 22–31)
CREAT SERPL-MCNC: 1.01 MG/DL — SIGNIFICANT CHANGE UP (ref 0.5–1.3)
EGFR: 87 ML/MIN/1.73M2 — SIGNIFICANT CHANGE UP
EOSINOPHIL # BLD AUTO: 0.14 K/UL — SIGNIFICANT CHANGE UP (ref 0–0.5)
EOSINOPHIL NFR BLD AUTO: 4.5 % — SIGNIFICANT CHANGE UP (ref 0–6)
ESTIMATED AVERAGE GLUCOSE: 108 — SIGNIFICANT CHANGE UP
GLUCOSE BLDC GLUCOMTR-MCNC: 84 MG/DL — SIGNIFICANT CHANGE UP (ref 70–99)
GLUCOSE BLDC GLUCOMTR-MCNC: 95 MG/DL — SIGNIFICANT CHANGE UP (ref 70–99)
GLUCOSE BLDC GLUCOMTR-MCNC: 96 MG/DL — SIGNIFICANT CHANGE UP (ref 70–99)
GLUCOSE SERPL-MCNC: 191 MG/DL — HIGH (ref 70–99)
HCT VFR BLD CALC: 34.3 % — LOW (ref 39–50)
HDLC SERPL-MCNC: 28 MG/DL — LOW
HGB BLD-MCNC: 11.6 G/DL — LOW (ref 13–17)
IANC: 1.69 K/UL — LOW (ref 1.8–7.4)
IMM GRANULOCYTES NFR BLD AUTO: 0.3 % — SIGNIFICANT CHANGE UP (ref 0–0.9)
LIPID PNL WITH DIRECT LDL SERPL: 33 MG/DL — SIGNIFICANT CHANGE UP
LYMPHOCYTES # BLD AUTO: 0.73 K/UL — LOW (ref 1–3.3)
LYMPHOCYTES # BLD AUTO: 23.5 % — SIGNIFICANT CHANGE UP (ref 13–44)
MCHC RBC-ENTMCNC: 28.3 PG — SIGNIFICANT CHANGE UP (ref 27–34)
MCHC RBC-ENTMCNC: 33.8 GM/DL — SIGNIFICANT CHANGE UP (ref 32–36)
MCV RBC AUTO: 83.7 FL — SIGNIFICANT CHANGE UP (ref 80–100)
MONOCYTES # BLD AUTO: 0.51 K/UL — SIGNIFICANT CHANGE UP (ref 0–0.9)
MONOCYTES NFR BLD AUTO: 16.5 % — HIGH (ref 2–14)
NEUTROPHILS # BLD AUTO: 1.69 K/UL — LOW (ref 1.8–7.4)
NEUTROPHILS NFR BLD AUTO: 54.6 % — SIGNIFICANT CHANGE UP (ref 43–77)
NON HDL CHOLESTEROL: 46 MG/DL — SIGNIFICANT CHANGE UP
NRBC # BLD: 0 /100 WBCS — SIGNIFICANT CHANGE UP (ref 0–0)
NRBC # FLD: 0 K/UL — SIGNIFICANT CHANGE UP (ref 0–0)
PLATELET # BLD AUTO: 175 K/UL — SIGNIFICANT CHANGE UP (ref 150–400)
POTASSIUM SERPL-MCNC: 4.5 MMOL/L — SIGNIFICANT CHANGE UP (ref 3.5–5.3)
POTASSIUM SERPL-SCNC: 4.5 MMOL/L — SIGNIFICANT CHANGE UP (ref 3.5–5.3)
PROT SERPL-MCNC: 7.4 G/DL — SIGNIFICANT CHANGE UP (ref 6–8.3)
RBC # BLD: 4.1 M/UL — LOW (ref 4.2–5.8)
RBC # FLD: 14.4 % — SIGNIFICANT CHANGE UP (ref 10.3–14.5)
SODIUM SERPL-SCNC: 130 MMOL/L — LOW (ref 135–145)
TRIGL SERPL-MCNC: 67 MG/DL — SIGNIFICANT CHANGE UP
TSH SERPL-MCNC: 1.77 UIU/ML — SIGNIFICANT CHANGE UP (ref 0.27–4.2)
WBC # BLD: 3.1 K/UL — LOW (ref 3.8–10.5)
WBC # FLD AUTO: 3.1 K/UL — LOW (ref 3.8–10.5)

## 2024-09-12 PROCEDURE — 99223 1ST HOSP IP/OBS HIGH 75: CPT

## 2024-09-12 PROCEDURE — 99222 1ST HOSP IP/OBS MODERATE 55: CPT | Mod: FS

## 2024-09-12 RX ORDER — BACITRACIN 500 [USP'U]/G
1 OINTMENT TOPICAL DAILY
Refills: 0 | Status: COMPLETED | OUTPATIENT
Start: 2024-09-12 | End: 2024-09-17

## 2024-09-12 RX ADMIN — CLINDAMYCIN PHOSPHATE 450 MILLIGRAM(S): 150 INJECTION, SOLUTION INTRAVENOUS at 14:51

## 2024-09-12 RX ADMIN — CLINDAMYCIN PHOSPHATE 450 MILLIGRAM(S): 150 INJECTION, SOLUTION INTRAVENOUS at 06:13

## 2024-09-12 RX ADMIN — Medication 3 MILLIGRAM(S): at 20:45

## 2024-09-12 RX ADMIN — Medication 0.4 MILLIGRAM(S): at 20:43

## 2024-09-12 RX ADMIN — Medication 500 MILLIGRAM(S): at 08:29

## 2024-09-12 RX ADMIN — CLINDAMYCIN PHOSPHATE 450 MILLIGRAM(S): 150 INJECTION, SOLUTION INTRAVENOUS at 22:36

## 2024-09-12 RX ADMIN — Medication 500 MILLIGRAM(S): at 20:44

## 2024-09-12 RX ADMIN — Medication 50 MICROGRAM(S): at 06:13

## 2024-09-12 RX ADMIN — Medication 10 MILLIGRAM(S): at 08:29

## 2024-09-12 RX ADMIN — LABETALOL HYDROCHLORIDE 200 MILLIGRAM(S): 200 TABLET, FILM COATED ORAL at 08:29

## 2024-09-12 RX ADMIN — Medication 17 GRAM(S): at 08:29

## 2024-09-12 RX ADMIN — ATORVASTATIN CALCIUM 40 MILLIGRAM(S): 10 TABLET, FILM COATED ORAL at 20:44

## 2024-09-12 RX ADMIN — LABETALOL HYDROCHLORIDE 200 MILLIGRAM(S): 200 TABLET, FILM COATED ORAL at 20:43

## 2024-09-12 RX ADMIN — Medication 1 MILLIGRAM(S): at 20:43

## 2024-09-12 RX ADMIN — Medication 5 MILLIGRAM(S): at 20:43

## 2024-09-12 RX ADMIN — BACITRACIN 1 APPLICATION(S): 500 OINTMENT TOPICAL at 14:51
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
## 2024-09-12 NOTE — DIETITIAN INITIAL EVALUATION ADULT - PERSON TAUGHT/METHOD
[email or web address] Activation    Thank you for requesting access to [employer]. Please follow the instructions below to securely access and download your online medical record. [employer] allows you to send messages to your doctor, view your test results, renew your prescriptions, schedule appointments, and more. How Do I Sign Up? In your internet browser, go to [email or web address]  Click on the First Time User? Click Here link in the Sign In box. You will be redirect to the New Member Sign Up page. Enter your [employer] Access Code exactly as it appears below. You will not need to use this code after youve completed the sign-up process. If you do not sign up before the expiration date, you must request a new code. [employer] Access Code: Activation code not generated  Current [employer] Status: Active (This is the date your [employer] access code will )    Enter the last four digits of your Social Security Number (xxxx) and Date of Birth (mm/dd/yyyy) as indicated and click Submit. You will be taken to the next sign-up page. Create a [employer] ID. This will be your [employer] login ID and cannot be changed, so think of one that is secure and easy to remember. Create a [employer] password. You can change your password at any time. Enter your Password Reset Question and Answer. This can be used at a later time if you forget your password. Enter your e-mail address. You will receive e-mail notification when new information is available in [address] Click Sign Up. You can now view and download portions of your medical record. Click the [place] link to download a portable copy of your medical information. Additional Information    If you have questions, please visit the Frequently Asked Questions section of the [employer] website at [email or web address] Remember, [employer] is NOT to be used for urgent needs. For medical emergencies, dial 911.
Provided verbal education re: Consistent carbohydrate diet./verbal instruction/patient instructed

## 2024-09-12 NOTE — BH INPATIENT PSYCHIATRY PROGRESS NOTE - CURRENT MEDICATION
MEDICATIONS  (STANDING):  amLODIPine   Tablet 10 milliGRAM(s) Oral daily  atorvastatin 40 milliGRAM(s) Oral at bedtime  bacitracin   Ointment 1 Application(s) Topical daily  benztropine 1 milliGRAM(s) Oral at bedtime  escitalopram 10 milliGRAM(s) Oral daily  haloperidol     Tablet 5 milliGRAM(s) Oral at bedtime  influenza   Vaccine 0.5 milliLiter(s) IntraMuscular once  insulin lispro (ADMELOG) corrective regimen sliding scale   SubCutaneous three times a day before meals  insulin lispro (ADMELOG) corrective regimen sliding scale   SubCutaneous at bedtime  labetalol 200 milliGRAM(s) Oral two times a day  levothyroxine 50 MICROGram(s) Oral daily  metFORMIN 500 milliGRAM(s) Oral two times a day  polyethylene glycol 3350 17 Gram(s) Oral daily  tamsulosin 0.4 milliGRAM(s) Oral at bedtime    MEDICATIONS  (PRN):  acetaminophen     Tablet .. 650 milliGRAM(s) Oral every 6 hours PRN Mild Pain (1 - 3), Moderate Pain (4 - 6)  haloperidol     Tablet 5 milliGRAM(s) Oral every 6 hours PRN agitation  haloperidol    Injectable 5 milliGRAM(s) IntraMuscular once PRN psychotic agitation  hydrOXYzine hydrochloride 50 milliGRAM(s) Oral every 6 hours PRN anxiety  LORazepam     Tablet 2 milliGRAM(s) Oral every 6 hours PRN severe anxiety  LORazepam   Injectable 2 milliGRAM(s) IntraMuscular once PRN psychotic anxiety  melatonin. 3 milliGRAM(s) Oral at bedtime PRN Insomnia

## 2024-09-12 NOTE — BH INPATIENT PSYCHIATRY PROGRESS NOTE - CURRENT MEDICATION
MEDICATIONS  (STANDING):  amLODIPine   Tablet 10 milliGRAM(s) Oral daily  atorvastatin 40 milliGRAM(s) Oral at bedtime  bacitracin   Ointment 1 Application(s) Topical daily  benztropine 1 milliGRAM(s) Oral at bedtime  clindamycin   Capsule 450 milliGRAM(s) Oral every 8 hours  escitalopram 10 milliGRAM(s) Oral daily  haloperidol     Tablet 5 milliGRAM(s) Oral at bedtime  influenza   Vaccine 0.5 milliLiter(s) IntraMuscular once  insulin lispro (ADMELOG) corrective regimen sliding scale   SubCutaneous three times a day before meals  insulin lispro (ADMELOG) corrective regimen sliding scale   SubCutaneous at bedtime  labetalol 200 milliGRAM(s) Oral two times a day  levothyroxine 50 MICROGram(s) Oral daily  metFORMIN 500 milliGRAM(s) Oral two times a day  polyethylene glycol 3350 17 Gram(s) Oral daily  tamsulosin 0.4 milliGRAM(s) Oral at bedtime    MEDICATIONS  (PRN):  acetaminophen     Tablet .. 650 milliGRAM(s) Oral every 6 hours PRN Mild Pain (1 - 3), Moderate Pain (4 - 6)  haloperidol     Tablet 5 milliGRAM(s) Oral every 6 hours PRN agitation  haloperidol    Injectable 5 milliGRAM(s) IntraMuscular once PRN psychotic agitation  hydrOXYzine hydrochloride 50 milliGRAM(s) Oral every 6 hours PRN anxiety  LORazepam     Tablet 2 milliGRAM(s) Oral every 6 hours PRN severe anxiety  LORazepam   Injectable 2 milliGRAM(s) IntraMuscular once PRN psychotic anxiety  melatonin. 3 milliGRAM(s) Oral at bedtime PRN Insomnia

## 2024-09-12 NOTE — DIETITIAN INITIAL EVALUATION ADULT - OTHER INFO
Pt is a 57 y/o male with PPHx of schizoaffective disorder (bipolar type with multiple [facility] admissions). PMHx: HTN, Hyperlipidemia, Hypothyroidism, CVID/hypogammaglobulinemia (monthly IVIG, last on 7/23), hx of frequent skin infection (MRSA, abscess/cellulitis w MRSA bacteremia s/p debridement 6/2023) Presented to the ED with sisters after appearing unwell and admitted to [facility] medical floor for concern for suicide attempt (labetalol overdose)   Pt stable and transferred to [place] [address].   Met Pt in his room. Reports good appetite/po intake at present. No GI distress noted. NKFA.  Pt avoids red meat. Provided verbal education re: Consistent carbohydrate diet. Pt verbalized understanding.  Food preferences taken and implemented.

## 2024-09-12 NOTE — BH INPATIENT PSYCHIATRY PROGRESS NOTE - NSBHATTESTBILLING_PSY_A_CORE
99222-Initial OBS or IP - moderate complexity OR 55-74 mins 68033-Uavmjzovxb OBS or IP - moderate complexity OR 35-49 mins

## 2024-09-12 NOTE — DIETITIAN INITIAL EVALUATION ADULT - REASON FOR ADMISSION
Anesthesia Transfer of Care Note    Patient: Felix Miramontes    Procedure(s) Performed: Procedure(s) (LRB):  CRANIECTOMY FOR CRANIOSYNOSTOSIS (N/A)    Patient location: ICU    Anesthesia Type: epidural    Transport from OR: Transported from OR on room air with adequate spontaneous ventilation    Post pain: adequate analgesia    Post assessment: no apparent anesthetic complications    Post vital signs: stable    Level of consciousness: awake and alert    Nausea/Vomiting: no nausea/vomiting    Complications: none    Transfer of care protocol was followed      Last vitals: Visit Vitals  BP (!) 124/86   Pulse (!) 170   Temp 36.3 °C (97.3 °F) (Axillary)   Resp 60   Wt 7.1 kg (15 lb 10.4 oz)   SpO2 (!) 83%      Schizoaffective disorder

## 2024-09-12 NOTE — BH INPATIENT PSYCHIATRY PROGRESS NOTE - NSBHCHARTREVIEWVS_PSY_A_CORE FT
Vital Signs Last 24 Hrs  T(C): 35.2 (09-16-24 @ 08:53), Max: 36.9 (09-15-24 @ 20:00)  T(F): 95.3 (09-16-24 @ 08:53), Max: 98.4 (09-15-24 @ 20:00)  HR: --  BP: --  BP(mean): --  RR: 18 (09-15-24 @ 20:42) (17 - 18)  SpO2: --    Orthostatic VS  09-15-24 @ 20:00  Lying BP: --/-- HR: --  Sitting BP: 129/74 HR: 67  Standing BP: 108/60 HR: 73  Site: --  Mode: --  Orthostatic VS  09-15-24 @ 08:49  Lying BP: --/-- HR: --  Sitting BP: 112/72 HR: 62  Standing BP: 92/60 HR: 90  Site: --  Mode: --  Orthostatic VS  09-14-24 @ 19:46  Lying BP: --/-- HR: --  Sitting BP: 138/60 HR: 57  Standing BP: 106/62 HR: 65  Site: --  Mode: --

## 2024-09-12 NOTE — BH INPATIENT PSYCHIATRY PROGRESS NOTE - NSBHMETABOLIC_PSY_ALL_CORE_FT
BMI: BMI (kg/m2): 30.4 (09-11-24 @ 14:40)  HbA1c: A1C with Estimated Average Glucose Result: 5.4 % (09-12-24 @ 10:48)    Glucose: POCT Blood Glucose.: 95 mg/dL (09-16-24 @ 11:30)    BP: --Vital Signs Last 24 Hrs  T(C): 35.2 (09-16-24 @ 08:53), Max: 36.9 (09-15-24 @ 20:00)  T(F): 95.3 (09-16-24 @ 08:53), Max: 98.4 (09-15-24 @ 20:00)  HR: --  BP: --  BP(mean): --  RR: 18 (09-15-24 @ 20:42) (17 - 18)  SpO2: --    Orthostatic VS  09-15-24 @ 20:00  Lying BP: --/-- HR: --  Sitting BP: 129/74 HR: 67  Standing BP: 108/60 HR: 73  Site: --  Mode: --  Orthostatic VS  09-15-24 @ 08:49  Lying BP: --/-- HR: --  Sitting BP: 112/72 HR: 62  Standing BP: 92/60 HR: 90  Site: --  Mode: --  Orthostatic VS  09-14-24 @ 19:46  Lying BP: --/-- HR: --  Sitting BP: 138/60 HR: 57  Standing BP: 106/62 HR: 65  Site: --  Mode: --    Lipid Panel: Date/Time: 09-12-24 @ 10:48  Cholesterol, Serum: 74  LDL Cholesterol Calculated: 33  HDL Cholesterol, Serum: 28  Total Cholesterol/HDL Ration Measurement: --  Triglycerides, Serum: 67

## 2024-09-12 NOTE — CONSULT NOTE ADULT - ASSESSMENT
58M admitted to [facility] after SA by labetalol OD, treated for abscess of scalp, transferred to [facility] for shizoaffective d/o and depression.    Plan:  Abscess of scalp: S/p I&D.  Continue clindamycin through 9/15.  Bacitracin to wound for 5 more days.    DM2: Metformin 500 BID. Atorvastatin for ASCVD prevention.      HTN: Continue labetalol    Hypothyroidism: Continue synthroid    CVID: Received IVIG infusion 9/9 at [facility].  Has appointment for infusion 10/10/24, will need to arrange transportation if still admitted to [facility] at that time.    Schizoaffective disorder: management per primary team

## 2024-09-12 NOTE — BH INPATIENT PSYCHIATRY PROGRESS NOTE - PRN MEDS
MEDICATIONS  (PRN):  acetaminophen     Tablet .. 650 milliGRAM(s) Oral every 6 hours PRN Mild Pain (1 - 3), Moderate Pain (4 - 6)  haloperidol     Tablet 5 milliGRAM(s) Oral every 6 hours PRN agitation  haloperidol    Injectable 5 milliGRAM(s) IntraMuscular once PRN psychotic agitation  hydrOXYzine hydrochloride 50 milliGRAM(s) Oral every 6 hours PRN anxiety  LORazepam     Tablet 2 milliGRAM(s) Oral every 6 hours PRN severe anxiety  LORazepam   Injectable 2 milliGRAM(s) IntraMuscular once PRN psychotic anxiety  melatonin. 3 milliGRAM(s) Oral at bedtime PRN Insomnia

## 2024-09-12 NOTE — CONSULT NOTE ADULT - SUBJECTIVE AND OBJECTIVE BOX
HPI: Pt is 58M admitted to [facility] 9/3/24 after SA by ingestion of labetalol.  He was treated with glucagon and monitored, stabilized. He was also found to have an abscess on his occipital scalp which unnderwent I&D.  He received IVIG infusion 9/9 (monthly for CVID) 9/9. He was transferred to [facility] 9/11/24.  He denies complaints.    PAST MEDICAL & SURGICAL HISTORY:  Smoker      DM2    HTN (hypertension)            Bipolar disorder      Chronic hyponatremia      CVID (common variable immunodeficiency)      Hyponatremia      Smoker      Deviated septum      Loss of teeth due to extraction  All teeth due to dental carries          Review of Systems:   CONSTITUTIONAL: No fever, weight loss, or fatigue  EYES: No eye pain, visual disturbances, or discharge  ENMT:  No difficulty hearing, tinnitus, vertigo; No sinus or throat pain  NECK: No pain or stiffness  RESPIRATORY: No cough, wheezing, chills or hemoptysis; No shortness of breath  CARDIOVASCULAR: No chest pain, palpitations, dizziness, or leg swelling  GASTROINTESTINAL: No abdominal or epigastric pain. No nausea, vomiting, or hematemesis; No diarrhea or constipation. No melena or hematochezia.  GENITOURINARY: No dysuria, frequency, hematuria, or incontinence  NEUROLOGICAL: No headaches, memory loss, loss of strength, numbness, or tremors  SKIN: see HPI  LYMPH NODES: No enlarged glands  ENDOCRINE: No heat or cold intolerance; No hair loss  MUSCULOSKELETAL: No joint pain or swelling; No muscle, back, or extremity pain  HEME/LYMPH: No easy bruising, or bleeding gums  ALLERY AND IMMUNOLOGIC: No hives or eczema    Allergies    Gammagard (Short breath; Rash)  amoxicillin (Fever)  penicillin (Rash)    Intolerances        Social History:     FAMILY HISTORY:  Family history of throat cancer (Father)    Family history of leukemia (Mother)        MEDICATIONS  (STANDING):  amLODIPine   Tablet 10 milliGRAM(s) Oral daily  atorvastatin 40 milliGRAM(s) Oral at bedtime  bacitracin   Ointment 1 Application(s) Topical daily  benztropine 1 milliGRAM(s) Oral at bedtime  clindamycin   Capsule 450 milliGRAM(s) Oral every 8 hours  escitalopram 10 milliGRAM(s) Oral daily  haloperidol     Tablet 5 milliGRAM(s) Oral at bedtime  influenza   Vaccine 0.5 milliLiter(s) IntraMuscular once  insulin lispro (ADMELOG) corrective regimen sliding scale   SubCutaneous three times a day before meals  insulin lispro (ADMELOG) corrective regimen sliding scale   SubCutaneous at bedtime  labetalol 200 milliGRAM(s) Oral two times a day  levothyroxine 50 MICROGram(s) Oral daily  metFORMIN 500 milliGRAM(s) Oral two times a day  polyethylene glycol 3350 17 Gram(s) Oral daily  tamsulosin 0.4 milliGRAM(s) Oral at bedtime    MEDICATIONS  (PRN):  acetaminophen     Tablet .. 650 milliGRAM(s) Oral every 6 hours PRN Mild Pain (1 - 3), Moderate Pain (4 - 6)  haloperidol     Tablet 5 milliGRAM(s) Oral every 6 hours PRN agitation  haloperidol    Injectable 5 milliGRAM(s) IntraMuscular once PRN psychotic agitation  hydrOXYzine hydrochloride 50 milliGRAM(s) Oral every 6 hours PRN anxiety  LORazepam     Tablet 2 milliGRAM(s) Oral every 6 hours PRN severe anxiety  LORazepam   Injectable 2 milliGRAM(s) IntraMuscular once PRN psychotic anxiety  melatonin. 3 milliGRAM(s) Oral at bedtime PRN Insomnia      Vital Signs Last 24 Hrs  T(C): 36.4 (12 Sep 2024 08:40), Max: 36.7 (11 Sep 2024 19:24)  T(F): 97.6 (12 Sep 2024 08:40), Max: 98 (11 Sep 2024 19:24)  HR: 62  BP: 124/73  BP(mean): --  RR: 16 (12 Sep 2024 08:40) (16 - 16)  SpO2: --      CAPILLARY BLOOD GLUCOSE      POCT Blood Glucose.: 84 mg/dL (12 Sep 2024 11:36)  POCT Blood Glucose.: 141 mg/dL (11 Sep 2024 20:06)        PHYSICAL EXAM:  GENERAL: NAD  HEAD:  Atraumatic, Normocephalic  EYES: EOMI, conjunctiva and sclera clear  NECK: Supple, No JVD  CHEST/LUNG: Clear to auscultation bilaterally; No wheeze  HEART: Regular rate and rhythm; No murmurs, rubs, or gallops  ABDOMEN: Soft, Nontender, Nondistended; Bowel sounds present  EXTREMITIES:  2+ Peripheral Pulses, No clubbing, cyanosis, or edema  NEUROLOGY: non-focal  SKIN: healing lesion on occiput    LABS:                        11.6   3.10  )-----------( 175      ( 12 Sep 2024 10:48 )             34.3     09-12    130<L>  |  93<L>  |  14  ----------------------------<  191<H>  4.5   |  22  |  1.01    Ca    9.3      12 Sep 2024 10:48    TPro  7.4  /  Alb  4.0  /  TBili  0.4  /  DBili  x   /  AST  20  /  ALT  19  /  AlkPhos  120  09-12          Urinalysis Basic - ( 12 Sep 2024 10:48 )    Color: x / Appearance: x / SG: x / pH: x  Gluc: 191 mg/dL / Ketone: x  / Bili: x / Urobili: x   Blood: x / Protein: x / Nitrite: x   Leuk Esterase: x / RBC: x / WBC x   Sq Epi: x / Non Sq Epi: x / Bacteria: x

## 2024-09-12 NOTE — BH INPATIENT PSYCHIATRY PROGRESS NOTE - NSBHASSESSSUMMFT_PSY_ALL_CORE
59 yo male w PMHx of schizoaffective disorder (bipolar type with multiple [facility] admissions), HTN, HLD, hypothyroidism, CVID/hypogammaglobulinemia (monthly IVIG, last on 7/23 or 9/9), hx of frequent skin infection (MRSA, abscess/cellulitis w MRSA bacteremia s/p debridement 6/2023) presented to the ED with sisters after appearing unwell admitted for concern for suicide attempt. Psych c/s for SI.     pt presents with chronic anxiety and depression along with paranoia, all which pt has struggled with for many years despite treatment. Chart reviewed regarding medication reconciliation. Wound consult ordered for continued assessment and care.    Treatment Plan  1. admitted to unit, legal status   2. safety  - routine checks as pt denies SIIP/HIIP and is able to contract for safety  - haldol and ativan PO/IM PRN for agitation and anxiety  3. psychiatric  - c/w haldol 5mg for psychosis  - haldol dec 200mg Q3w last given 8/28, next due 9/18  - c/w lexapro 10mg  - past trials: abilify (worsening paranoia)  4. medical  -chronic hyponatremia: Na low 131, f/u BMP in AM ; on fluid restriction < 1.5L   - hypothyroidism: levothyroxine 50mcg  - HLD: lipitor 40mg QHS  - HTN: labetalol 200mg BID, amlodipine 10mg   - DM2: metformin 500mg BID  - MRSA: s/p scalp I/D on 9/8 with recommendations from [facility]   	- c/w clindamycin 450mg PO every 8 hours x 5 days (started 9/10/23)  	- wound consult ordered at [facility] 9/11, recommended bacitracin ointment QD x5 days (started 9/12/24)  - CVID: monthly IVIG (typically Gammagard S/D formula); last dose given 9/9/23 (follows with Dr. Mitchell Boxer)  5. encourage I/G/M therapy  6. dispo  - wound care: [facility]: [phone]. Address: [address] [place] [address]  - psychiatry: Dr Hollie Cook with [facility]
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
## 2024-09-12 NOTE — BH INPATIENT PSYCHIATRY PROGRESS NOTE - NSBHASSESSSUMMFT_PSY_ALL_CORE
57 yo male w PMHx of schizoaffective disorder (bipolar type with multiple [facility] admissions), HTN, HLD, hypothyroidism, CVID/hypogammaglobulinemia (monthly IVIG, last on 7/23 or 9/9), hx of frequent skin infection (MRSA, abscess/cellulitis w MRSA bacteremia s/p debridement 6/2023) presented to the ED with sisters after appearing unwell admitted for concern for suicide attempt. Psych c/s for SI.     pt presents with chronic anxiety and depression along with paranoia, all which pt has struggled with for many years despite treatment. Chart reviewed regarding medication reconciliation. Wound consult ordered for continued assessment and care.    Treatment Plan  1. admitted to unit, legal status   2. safety  - routine checks as pt denies SIIP/HIIP and is able to contract for safety  - haldol and ativan PO/IM PRN for agitation and anxiety  3. psychiatric  - c/w haldol 5mg for psychosis  - haldol dec 200mg Q3w last given 8/28, next due 9/18  - c/w lexapro 10mg  - past trials: abilify (worsening paranoia)  4. medical  - hypothyroidism: levothyroxine 50mcg  - HLD: lipitor 40mg QHS  - HTN: labetalol 200mg BID, amlodipine 10mg   - DM2: metformin 500mg BID  - MRSA: s/p scalp I/D on 9/8 with recommendations from C/L   	- c/w clindamycin 450mg PO every 8 hours x 5 days (started 9/10/23)  	- mupirocin to b/l nares x 5 days  	- after discharge can attempt MRSA decolonization w/ hibaclens bath 3x per week x 2 weeks   	-f/u pathology from wound  	- no objection to discharge from ID perspective  	- wound consult ordered at [facility] 9/11  - CVID: monthly IVIG (typically Gammagard S/D formula); last dose given 9/9/23 (follows with Dr. Mitchell Boxer)  5. encourage I/G/M therapy  6. dispo  - wound care: [facility]: [phone]. Address: [address] [place] [address]  - psychiatry: Dr Hollie Cook with [facility]   59 yo male w PMHx of schizoaffective disorder (bipolar type with multiple [facility] admissions), HTN, HLD, hypothyroidism, CVID/hypogammaglobulinemia (monthly IVIG, last on 7/23 or 9/9), hx of frequent skin infection (MRSA, abscess/cellulitis w MRSA bacteremia s/p debridement 6/2023) presented to the ED with sisters after appearing unwell admitted for concern for suicide attempt. Psych c/s for SI.     pt presents with chronic anxiety and depression along with paranoia, all which pt has struggled with for many years despite treatment. Chart reviewed regarding medication reconciliation. Wound consult ordered for continued assessment and care.    Treatment Plan  1. admitted to unit, legal status   2. safety  - routine checks as pt denies SIIP/HIIP and is able to contract for safety  - haldol and ativan PO/IM PRN for agitation and anxiety  3. psychiatric  - c/w haldol 5mg for psychosis  - haldol dec 200mg Q3w last given 8/28, next due 9/18  - c/w lexapro 10mg  - past trials: abilify (worsening paranoia)  4. medical  - hypothyroidism: levothyroxine 50mcg  - HLD: lipitor 40mg QHS  - HTN: labetalol 200mg BID, amlodipine 10mg   - DM2: metformin 500mg BID  - MRSA: s/p scalp I/D on 9/8 with recommendations from C/L   	- c/w clindamycin 450mg PO every 8 hours x 5 days (started 9/10/23)  	- wound consult ordered at [facility] 9/11, recommended bacitracin ointment QD x5 days (started 9/12/24)  - CVID: monthly IVIG (typically Gammagard S/D formula); last dose given 9/9/23 (follows with Dr. Mitchell Boxer)  5. encourage I/G/M therapy  6. dispo  - wound care: [facility]: [phone]. Address: [address] [place] [address]  - psychiatry: Dr Hollie Cook with [facility]

## 2024-09-12 NOTE — BH INPATIENT PSYCHIATRY PROGRESS NOTE - NSBHMETABOLIC_PSY_ALL_CORE_FT
BMI: BMI (kg/m2): 30.4 (09-11-24 @ 14:40)  HbA1c: A1C with Estimated Average Glucose Result: 5.4 % (09-12-24 @ 10:48)    Glucose: POCT Blood Glucose.: 84 mg/dL (09-12-24 @ 11:36)    BP: --Vital Signs Last 24 Hrs  T(C): 36.4 (09-12-24 @ 08:40), Max: 36.7 (09-11-24 @ 19:24)  T(F): 97.6 (09-12-24 @ 08:40), Max: 98 (09-11-24 @ 19:24)  HR: --  BP: --  BP(mean): --  RR: 16 (09-12-24 @ 08:40) (16 - 18)  SpO2: --    Orthostatic VS  09-12-24 @ 08:40  Lying BP: --/-- HR: --  Sitting BP: 124/73 HR: 62  Standing BP: 107/57 HR: 67  Site: upper left arm  Mode: electronic  Orthostatic VS  09-11-24 @ 19:24  Lying BP: --/-- HR: --  Sitting BP: 149/80 HR: 74  Standing BP: 149/74 HR: 83  Site: --  Mode: --  Orthostatic VS  09-11-24 @ 14:40  Lying BP: --/-- HR: --  Sitting BP: 162/67 HR: 66  Standing BP: 144/66 HR: 76  Site: upper right arm  Mode: electronic    Lipid Panel: Date/Time: 09-12-24 @ 10:48  Cholesterol, Serum: 74  LDL Cholesterol Calculated: 33  HDL Cholesterol, Serum: 28  Total Cholesterol/HDL Ration Measurement: --  Triglycerides, Serum: 67   BMI: BMI (kg/m2): 30.4 (09-11-24 @ 14:40)  HbA1c: A1C with Estimated Average Glucose Result: 5.4 % (09-12-24 @ 10:48)    Glucose: POCT Blood Glucose.: 84 mg/dL (09-12-24 @ 11:36)    BP: --Vital Signs Last 24 Hrs  T(C): 36.4 (09-12-24 @ 08:40), Max: 36.7 (09-11-24 @ 19:24)  T(F): 97.6 (09-12-24 @ 08:40), Max: 98 (09-11-24 @ 19:24)  HR: --  BP: --  BP(mean): --  RR: 16 (09-12-24 @ 08:40) (16 - 16)  SpO2: --    Orthostatic VS  09-12-24 @ 08:40  Lying BP: --/-- HR: --  Sitting BP: 124/73 HR: 62  Standing BP: 107/57 HR: 67  Site: upper left arm  Mode: electronic  Orthostatic VS  09-11-24 @ 19:24  Lying BP: --/-- HR: --  Sitting BP: 149/80 HR: 74  Standing BP: 149/74 HR: 83  Site: --  Mode: --  Orthostatic VS  09-11-24 @ 14:40  Lying BP: --/-- HR: --  Sitting BP: 162/67 HR: 66  Standing BP: 144/66 HR: 76  Site: upper right arm  Mode: electronic    Lipid Panel: Date/Time: 09-12-24 @ 10:48  Cholesterol, Serum: 74  LDL Cholesterol Calculated: 33  HDL Cholesterol, Serum: 28  Total Cholesterol/HDL Ration Measurement: --  Triglycerides, Serum: 67

## 2024-09-12 NOTE — BH INPATIENT PSYCHIATRY PROGRESS NOTE - NSBHCHARTREVIEWVS_PSY_A_CORE FT
Vital Signs Last 24 Hrs  T(C): 36.4 (09-12-24 @ 08:40), Max: 36.7 (09-11-24 @ 19:24)  T(F): 97.6 (09-12-24 @ 08:40), Max: 98 (09-11-24 @ 19:24)  HR: --  BP: --  BP(mean): --  RR: 16 (09-12-24 @ 08:40) (16 - 18)  SpO2: --    Orthostatic VS  09-12-24 @ 08:40  Lying BP: --/-- HR: --  Sitting BP: 124/73 HR: 62  Standing BP: 107/57 HR: 67  Site: upper left arm  Mode: electronic  Orthostatic VS  09-11-24 @ 19:24  Lying BP: --/-- HR: --  Sitting BP: 149/80 HR: 74  Standing BP: 149/74 HR: 83  Site: --  Mode: --  Orthostatic VS  09-11-24 @ 14:40  Lying BP: --/-- HR: --  Sitting BP: 162/67 HR: 66  Standing BP: 144/66 HR: 76  Site: upper right arm  Mode: electronic   Vital Signs Last 24 Hrs  T(C): 36.4 (09-12-24 @ 08:40), Max: 36.7 (09-11-24 @ 19:24)  T(F): 97.6 (09-12-24 @ 08:40), Max: 98 (09-11-24 @ 19:24)  HR: --  BP: --  BP(mean): --  RR: 16 (09-12-24 @ 08:40) (16 - 16)  SpO2: --    Orthostatic VS  09-12-24 @ 08:40  Lying BP: --/-- HR: --  Sitting BP: 124/73 HR: 62  Standing BP: 107/57 HR: 67  Site: upper left arm  Mode: electronic  Orthostatic VS  09-11-24 @ 19:24  Lying BP: --/-- HR: --  Sitting BP: 149/80 HR: 74  Standing BP: 149/74 HR: 83  Site: --  Mode: --  Orthostatic VS  09-11-24 @ 14:40  Lying BP: --/-- HR: --  Sitting BP: 162/67 HR: 66  Standing BP: 144/66 HR: 76  Site: upper right arm  Mode: electronic   Vital Signs Last 24 Hrs  T(C): 36.6 (09-13-24 @ 08:07), Max: 36.6 (09-13-24 @ 08:07)  T(F): 97.9 (09-13-24 @ 08:07), Max: 97.9 (09-13-24 @ 08:07)  HR: --  BP: --  BP(mean): --  RR: 18 (09-13-24 @ 11:56) (18 - 18)  SpO2: --    Orthostatic VS  09-13-24 @ 08:07  Lying BP: --/-- HR: --  Sitting BP: 112/59 HR: 102  Standing BP: 90/50 HR: 89  Site: --  Mode: --  Orthostatic VS  09-12-24 @ 19:14  Lying BP: --/-- HR: --  Sitting BP: 125/72 HR: 60  Standing BP: 110/68 HR: 68  Site: --  Mode: --  Orthostatic VS  09-12-24 @ 08:40  Lying BP: --/-- HR: --  Sitting BP: 124/73 HR: 62  Standing BP: 107/57 HR: 67  Site: upper left arm  Mode: electronic  Orthostatic VS  09-11-24 @ 19:24  Lying BP: --/-- HR: --  Sitting BP: 149/80 HR: 74  Standing BP: 149/74 HR: 83  Site: --  Mode: --  Orthostatic VS  09-11-24 @ 14:40  Lying BP: --/-- HR: --  Sitting BP: 162/67 HR: 66  Standing BP: 144/66 HR: 76  Site: upper right arm  Mode: electronic

## 2024-09-12 NOTE — BH INPATIENT PSYCHIATRY PROGRESS NOTE - NSBHFUPINTERVALHXFT_PSY_A_CORE
Chart and history reviewed and discussed with treatment team. No events reported overnight. Pt seen visible on the unit,   No behavioral issues noted. Medication compliant, tolerating well, free of EPS. Sleep and appetite maintained. Denies SI/HI/AVH. Continue to monitor for safety.

## 2024-09-12 NOTE — DIETITIAN INITIAL EVALUATION ADULT - PERTINENT LABORATORY DATA
CAPILLARY BLOOD GLUCOSE  POCT Blood Glucose.: 84 mg/dL (12 Sep 2024 11:36)  POCT Blood Glucose.: 141 mg/dL (11 Sep 2024 20:06)    A1C with Estimated Average Glucose Result: 5.4 % (09-12-24 @ 10:48)

## 2024-09-13 PROBLEM — L02.519: Chronic | Status: INACTIVE | Noted: 2022-01-13 | Resolved: 2024-09-13

## 2024-09-13 LAB
GLUCOSE BLDC GLUCOMTR-MCNC: 113 MG/DL — HIGH (ref 70–99)
GLUCOSE BLDC GLUCOMTR-MCNC: 113 MG/DL — HIGH (ref 70–99)
GLUCOSE BLDC GLUCOMTR-MCNC: 123 MG/DL — HIGH (ref 70–99)
GLUCOSE BLDC GLUCOMTR-MCNC: 89 MG/DL — SIGNIFICANT CHANGE UP (ref 70–99)

## 2024-09-13 PROCEDURE — 99232 SBSQ HOSP IP/OBS MODERATE 35: CPT

## 2024-09-13 RX ADMIN — CLINDAMYCIN PHOSPHATE 450 MILLIGRAM(S): 150 INJECTION, SOLUTION INTRAVENOUS at 23:52

## 2024-09-13 RX ADMIN — LABETALOL HYDROCHLORIDE 200 MILLIGRAM(S): 200 TABLET, FILM COATED ORAL at 20:30

## 2024-09-13 RX ADMIN — BACITRACIN 1 APPLICATION(S): 500 OINTMENT TOPICAL at 09:51

## 2024-09-13 RX ADMIN — ATORVASTATIN CALCIUM 40 MILLIGRAM(S): 10 TABLET, FILM COATED ORAL at 20:30

## 2024-09-13 RX ADMIN — Medication 500 MILLIGRAM(S): at 09:50

## 2024-09-13 RX ADMIN — Medication 17 GRAM(S): at 09:50

## 2024-09-13 RX ADMIN — CLINDAMYCIN PHOSPHATE 450 MILLIGRAM(S): 150 INJECTION, SOLUTION INTRAVENOUS at 15:12

## 2024-09-13 RX ADMIN — Medication 0.4 MILLIGRAM(S): at 20:31

## 2024-09-13 RX ADMIN — Medication 1 MILLIGRAM(S): at 20:30

## 2024-09-13 RX ADMIN — CLINDAMYCIN PHOSPHATE 450 MILLIGRAM(S): 150 INJECTION, SOLUTION INTRAVENOUS at 09:50

## 2024-09-13 RX ADMIN — Medication 5 MILLIGRAM(S): at 20:31

## 2024-09-13 RX ADMIN — Medication 3 MILLIGRAM(S): at 20:30

## 2024-09-13 RX ADMIN — Medication 500 MILLIGRAM(S): at 20:31

## 2024-09-13 RX ADMIN — Medication 10 MILLIGRAM(S): at 09:51

## 2024-09-13 NOTE — BH INPATIENT PSYCHIATRY PROGRESS NOTE - NSBHASSESSSUMMFT_PSY_ALL_CORE
59 yo male w PMHx of schizoaffective disorder (bipolar type with multiple [facility] admissions), HTN, HLD, hypothyroidism, CVID/hypogammaglobulinemia (monthly IVIG, last on 7/23 or 9/9), hx of frequent skin infection (MRSA, abscess/cellulitis w MRSA bacteremia s/p debridement 6/2023) presented to the ED with sisters after appearing unwell admitted for concern for suicide attempt. Psych c/s for SI.     pt presents with chronic anxiety and depression along with paranoia, all which pt has struggled with for many years despite treatment. Chart reviewed regarding medication reconciliation. Wound consult ordered for continued assessment and care.    Treatment Plan  1. admitted to unit, legal status   2. safety  - routine checks as pt denies SIIP/HIIP and is able to contract for safety  - haldol and ativan PO/IM PRN for agitation and anxiety  3. psychiatric  - c/w haldol 5mg for psychosis  - haldol dec 200mg Q3w last given 8/28, next due 9/18  - c/w lexapro 10mg  - past trials: abilify (worsening paranoia)  4. medical  -chronic hyponatremia: Na low 131, f/u BMP in AM ; on fluid restriction < 1.5L   - hypothyroidism: levothyroxine 50mcg  - HLD: lipitor 40mg QHS  - HTN: labetalol 200mg BID, amlodipine 10mg   - DM2: metformin 500mg BID  - MRSA: s/p scalp I/D on 9/8 with recommendations from C/L   	- c/w clindamycin 450mg PO every 8 hours x 5 days (started 9/10/23)  	- wound consult ordered at [facility] 9/11, recommended bacitracin ointment QD x5 days (started 9/12/24)  - CVID: monthly IVIG (typically Gammagard S/D formula); last dose given 9/9/23 (follows with Dr. Mitchell Boxer)  5. encourage I/G/M therapy  6. dispo  - wound care: [facility]: [phone]. Address: [address] [place] [address]  - psychiatry: Dr Hollie Cook with [facility]

## 2024-09-13 NOTE — BH INPATIENT PSYCHIATRY PROGRESS NOTE - NSBHFUPINTERVALHXFT_PSY_A_CORE
Pt seen and examined. Chart reviewed. No acute overnight events reported. Pt remains medication adherent, tolerating it well without reported side effects. Denies AVH/SI/HI. Sleeping well with good appetite. In good behavioral control.  Pt reports mood and paranoia are better since admission, overall feels brighter especially after sisters visiting. Pt is future oriented, motivated for PACE program. Na low, on fluid restriction < 1.5 L . Repeat BMP in AM.

## 2024-09-13 NOTE — BH INPATIENT PSYCHIATRY PROGRESS NOTE - NSBHCHARTREVIEWVS_PSY_A_CORE FT
Vital Signs Last 24 Hrs  T(C): 36.6 (09-13-24 @ 08:07), Max: 36.6 (09-13-24 @ 08:07)  T(F): 97.9 (09-13-24 @ 08:07), Max: 97.9 (09-13-24 @ 08:07)  HR: --  BP: --  BP(mean): --  RR: 18 (09-13-24 @ 11:56) (18 - 18)  SpO2: --    Orthostatic VS  09-13-24 @ 08:07  Lying BP: --/-- HR: --  Sitting BP: 112/59 HR: 102  Standing BP: 90/50 HR: 89  Site: --  Mode: --  Orthostatic VS  09-12-24 @ 19:14  Lying BP: --/-- HR: --  Sitting BP: 125/72 HR: 60  Standing BP: 110/68 HR: 68  Site: --  Mode: --  Orthostatic VS  09-12-24 @ 08:40  Lying BP: --/-- HR: --  Sitting BP: 124/73 HR: 62  Standing BP: 107/57 HR: 67  Site: upper left arm  Mode: electronic  Orthostatic VS  09-11-24 @ 19:24  Lying BP: --/-- HR: --  Sitting BP: 149/80 HR: 74  Standing BP: 149/74 HR: 83  Site: --  Mode: --

## 2024-09-13 NOTE — BH INPATIENT PSYCHIATRY PROGRESS NOTE - NSBHMETABOLIC_PSY_ALL_CORE_FT
BMI: BMI (kg/m2): 30.4 (09-11-24 @ 14:40)  HbA1c: A1C with Estimated Average Glucose Result: 5.4 % (09-12-24 @ 10:48)    Glucose: POCT Blood Glucose.: 113 mg/dL (09-13-24 @ 11:16)    BP: --Vital Signs Last 24 Hrs  T(C): 36.6 (09-13-24 @ 08:07), Max: 36.6 (09-13-24 @ 08:07)  T(F): 97.9 (09-13-24 @ 08:07), Max: 97.9 (09-13-24 @ 08:07)  HR: --  BP: --  BP(mean): --  RR: 18 (09-13-24 @ 11:56) (18 - 18)  SpO2: --    Orthostatic VS  09-13-24 @ 08:07  Lying BP: --/-- HR: --  Sitting BP: 112/59 HR: 102  Standing BP: 90/50 HR: 89  Site: --  Mode: --  Orthostatic VS  09-12-24 @ 19:14  Lying BP: --/-- HR: --  Sitting BP: 125/72 HR: 60  Standing BP: 110/68 HR: 68  Site: --  Mode: --  Orthostatic VS  09-12-24 @ 08:40  Lying BP: --/-- HR: --  Sitting BP: 124/73 HR: 62  Standing BP: 107/57 HR: 67  Site: upper left arm  Mode: electronic  Orthostatic VS  09-11-24 @ 19:24  Lying BP: --/-- HR: --  Sitting BP: 149/80 HR: 74  Standing BP: 149/74 HR: 83  Site: --  Mode: --    Lipid Panel: Date/Time: 09-12-24 @ 10:48  Cholesterol, Serum: 74  LDL Cholesterol Calculated: 33  HDL Cholesterol, Serum: 28  Total Cholesterol/HDL Ration Measurement: --  Triglycerides, Serum: 67

## 2024-09-13 NOTE — BH INPATIENT PSYCHIATRY PROGRESS NOTE - CURRENT MEDICATION
MEDICATIONS  (STANDING):  amLODIPine   Tablet 10 milliGRAM(s) Oral daily  atorvastatin 40 milliGRAM(s) Oral at bedtime  bacitracin   Ointment 1 Application(s) Topical daily  benztropine 1 milliGRAM(s) Oral at bedtime  clindamycin   Capsule 450 milliGRAM(s) Oral every 8 hours  escitalopram 10 milliGRAM(s) Oral daily  haloperidol     Tablet 5 milliGRAM(s) Oral at bedtime  influenza   Vaccine 0.5 milliLiter(s) IntraMuscular once  insulin lispro (ADMELOG) corrective regimen sliding scale   SubCutaneous at bedtime  insulin lispro (ADMELOG) corrective regimen sliding scale   SubCutaneous three times a day before meals  labetalol 200 milliGRAM(s) Oral two times a day  levothyroxine 50 MICROGram(s) Oral daily  metFORMIN 500 milliGRAM(s) Oral two times a day  polyethylene glycol 3350 17 Gram(s) Oral daily  tamsulosin 0.4 milliGRAM(s) Oral at bedtime    MEDICATIONS  (PRN):  acetaminophen     Tablet .. 650 milliGRAM(s) Oral every 6 hours PRN Mild Pain (1 - 3), Moderate Pain (4 - 6)  haloperidol     Tablet 5 milliGRAM(s) Oral every 6 hours PRN agitation  haloperidol    Injectable 5 milliGRAM(s) IntraMuscular once PRN psychotic agitation  hydrOXYzine hydrochloride 50 milliGRAM(s) Oral every 6 hours PRN anxiety  LORazepam     Tablet 2 milliGRAM(s) Oral every 6 hours PRN severe anxiety  LORazepam   Injectable 2 milliGRAM(s) IntraMuscular once PRN psychotic anxiety  melatonin. 3 milliGRAM(s) Oral at bedtime PRN Insomnia

## 2024-09-14 LAB
ANION GAP SERPL CALC-SCNC: 11 MMOL/L — SIGNIFICANT CHANGE UP (ref 7–14)
BUN SERPL-MCNC: 17 MG/DL — SIGNIFICANT CHANGE UP (ref 7–23)
CALCIUM SERPL-MCNC: 9.6 MG/DL — SIGNIFICANT CHANGE UP (ref 8.4–10.5)
CHLORIDE SERPL-SCNC: 95 MMOL/L — LOW (ref 98–107)
CO2 SERPL-SCNC: 24 MMOL/L — SIGNIFICANT CHANGE UP (ref 22–31)
CREAT SERPL-MCNC: 1.04 MG/DL — SIGNIFICANT CHANGE UP (ref 0.5–1.3)
EGFR: 84 ML/MIN/1.73M2 — SIGNIFICANT CHANGE UP
GLUCOSE BLDC GLUCOMTR-MCNC: 103 MG/DL — HIGH (ref 70–99)
GLUCOSE BLDC GLUCOMTR-MCNC: 114 MG/DL — HIGH (ref 70–99)
GLUCOSE BLDC GLUCOMTR-MCNC: 125 MG/DL — HIGH (ref 70–99)
GLUCOSE BLDC GLUCOMTR-MCNC: 96 MG/DL — SIGNIFICANT CHANGE UP (ref 70–99)
GLUCOSE SERPL-MCNC: 153 MG/DL — HIGH (ref 70–99)
POTASSIUM SERPL-MCNC: 4.8 MMOL/L — SIGNIFICANT CHANGE UP (ref 3.5–5.3)
POTASSIUM SERPL-SCNC: 4.8 MMOL/L — SIGNIFICANT CHANGE UP (ref 3.5–5.3)
SODIUM SERPL-SCNC: 130 MMOL/L — LOW (ref 135–145)

## 2024-09-14 PROCEDURE — 99232 SBSQ HOSP IP/OBS MODERATE 35: CPT

## 2024-09-14 RX ADMIN — Medication 500 MILLIGRAM(S): at 20:27

## 2024-09-14 RX ADMIN — Medication 5 MILLIGRAM(S): at 20:26

## 2024-09-14 RX ADMIN — Medication 3 MILLIGRAM(S): at 20:27

## 2024-09-14 RX ADMIN — ATORVASTATIN CALCIUM 40 MILLIGRAM(S): 10 TABLET, FILM COATED ORAL at 20:27

## 2024-09-14 RX ADMIN — Medication 500 MILLIGRAM(S): at 08:22

## 2024-09-14 RX ADMIN — Medication 10 MILLIGRAM(S): at 08:21

## 2024-09-14 RX ADMIN — CLINDAMYCIN PHOSPHATE 450 MILLIGRAM(S): 150 INJECTION, SOLUTION INTRAVENOUS at 06:18

## 2024-09-14 RX ADMIN — Medication 1 MILLIGRAM(S): at 20:26

## 2024-09-14 RX ADMIN — LABETALOL HYDROCHLORIDE 200 MILLIGRAM(S): 200 TABLET, FILM COATED ORAL at 08:21

## 2024-09-14 RX ADMIN — LABETALOL HYDROCHLORIDE 200 MILLIGRAM(S): 200 TABLET, FILM COATED ORAL at 20:26

## 2024-09-14 RX ADMIN — Medication 0.4 MILLIGRAM(S): at 20:27

## 2024-09-14 RX ADMIN — CLINDAMYCIN PHOSPHATE 450 MILLIGRAM(S): 150 INJECTION, SOLUTION INTRAVENOUS at 14:58

## 2024-09-14 RX ADMIN — BACITRACIN 1 APPLICATION(S): 500 OINTMENT TOPICAL at 08:22

## 2024-09-14 RX ADMIN — CLINDAMYCIN PHOSPHATE 450 MILLIGRAM(S): 150 INJECTION, SOLUTION INTRAVENOUS at 21:30

## 2024-09-14 NOTE — BH INPATIENT PSYCHIATRY PROGRESS NOTE - NSBHFUPINTERVALHXFT_PSY_A_CORE
Pt compliant with medication and tolerating it well.  Chart reviewed, no events reported overnight.  Na+ remains at 130.  Pt reports he has been keeping to his fluid restriction and only drank about 1 liter so far today.  Pt denies feeling depressed.  Pt denies SI/HI/I/P or AH/VH or paranoia.  Pt eating and sleeping well.

## 2024-09-14 NOTE — BH INPATIENT PSYCHIATRY PROGRESS NOTE - NSBHASSESSSUMMFT_PSY_ALL_CORE
57 yo male w PMHx of schizoaffective disorder (bipolar type with multiple [facility] admissions), HTN, HLD, hypothyroidism, CVID/hypogammaglobulinemia (monthly IVIG, last on 7/23 or 9/9), hx of frequent skin infection (MRSA, abscess/cellulitis w MRSA bacteremia s/p debridement 6/2023) presented to the ED with sisters after appearing unwell admitted for concern for suicide attempt. Psych c/s for SI.     pt presents with chronic anxiety and depression along with paranoia, all which pt has struggled with for many years despite treatment. Chart reviewed regarding medication reconciliation. Wound consult ordered for continued assessment and care.    Treatment Plan  1. admitted to unit, legal status   2. safety  - routine checks as pt denies SIIP/HIIP and is able to contract for safety  - haldol and ativan PO/IM PRN for agitation and anxiety  3. psychiatric  - c/w haldol 5mg for psychosis  - haldol dec 200mg Q3w last given 8/28, next due 9/18  - c/w lexapro 10mg  - past trials: abilify (worsening paranoia)  4. medical  -chronic hyponatremia: Na low 131, f/u BMP in AM ; on fluid restriction < 1.5L   - hypothyroidism: levothyroxine 50mcg  - HLD: lipitor 40mg QHS  - HTN: labetalol 200mg BID, amlodipine 10mg   - DM2: metformin 500mg BID  - MRSA: s/p scalp I/D on 9/8 with recommendations from C/L   	- c/w clindamycin 450mg PO every 8 hours x 5 days (started 9/10/23)  	- wound consult ordered at [facility] 9/11, recommended bacitracin ointment QD x5 days (started 9/12/24)  - CVID: monthly IVIG (typically Gammagard S/D formula); last dose given 9/9/23 (follows with Dr. Mitchell Boxer)  5. encourage I/G/M therapy  6. dispo  - wound care: [facility]: [phone]. Address: [address] [place] [address]  - psychiatry: Dr Hollie Cook with [facility]

## 2024-09-14 NOTE — BH INPATIENT PSYCHIATRY PROGRESS NOTE - NSBHMETABOLIC_PSY_ALL_CORE_FT
BMI: BMI (kg/m2): 30.4 (09-11-24 @ 14:40)  HbA1c: A1C with Estimated Average Glucose Result: 5.4 % (09-12-24 @ 10:48)    Glucose: POCT Blood Glucose.: 125 mg/dL (09-14-24 @ 11:14)    BP: --Vital Signs Last 24 Hrs  T(C): 36.2 (09-14-24 @ 08:31), Max: 36.7 (09-13-24 @ 19:28)  T(F): 97.1 (09-14-24 @ 08:31), Max: 98.1 (09-13-24 @ 19:28)  HR: --  BP: --  BP(mean): --  RR: --  SpO2: --    Orthostatic VS  09-14-24 @ 08:31  Lying BP: --/-- HR: --  Sitting BP: 149/72 HR: 62  Standing BP: 116/79 HR: 68  Site: --  Mode: --  Orthostatic VS  09-13-24 @ 19:28  Lying BP: --/-- HR: --  Sitting BP: 125/72 HR: 61  Standing BP: 106/60 HR: 68  Site: --  Mode: --  Orthostatic VS  09-13-24 @ 08:07  Lying BP: --/-- HR: --  Sitting BP: 112/59 HR: 102  Standing BP: 90/50 HR: 89  Site: --  Mode: --  Orthostatic VS  09-12-24 @ 19:14  Lying BP: --/-- HR: --  Sitting BP: 125/72 HR: 60  Standing BP: 110/68 HR: 68  Site: --  Mode: --    Lipid Panel: Date/Time: 09-12-24 @ 10:48  Cholesterol, Serum: 74  LDL Cholesterol Calculated: 33  HDL Cholesterol, Serum: 28  Total Cholesterol/HDL Ration Measurement: --  Triglycerides, Serum: 67

## 2024-09-14 NOTE — BH INPATIENT PSYCHIATRY PROGRESS NOTE - NSBHCHARTREVIEWVS_PSY_A_CORE FT
Vital Signs Last 24 Hrs  T(C): 36.2 (09-14-24 @ 08:31), Max: 36.7 (09-13-24 @ 19:28)  T(F): 97.1 (09-14-24 @ 08:31), Max: 98.1 (09-13-24 @ 19:28)  HR: --  BP: --  BP(mean): --  RR: --  SpO2: --    Orthostatic VS  09-14-24 @ 08:31  Lying BP: --/-- HR: --  Sitting BP: 149/72 HR: 62  Standing BP: 116/79 HR: 68  Site: --  Mode: --  Orthostatic VS  09-13-24 @ 19:28  Lying BP: --/-- HR: --  Sitting BP: 125/72 HR: 61  Standing BP: 106/60 HR: 68  Site: --  Mode: --  Orthostatic VS  09-13-24 @ 08:07  Lying BP: --/-- HR: --  Sitting BP: 112/59 HR: 102  Standing BP: 90/50 HR: 89  Site: --  Mode: --  Orthostatic VS  09-12-24 @ 19:14  Lying BP: --/-- HR: --  Sitting BP: 125/72 HR: 60  Standing BP: 110/68 HR: 68  Site: --  Mode: --

## 2024-09-15 LAB
GLUCOSE BLDC GLUCOMTR-MCNC: 104 MG/DL — HIGH (ref 70–99)
GLUCOSE BLDC GLUCOMTR-MCNC: 111 MG/DL — HIGH (ref 70–99)
GLUCOSE BLDC GLUCOMTR-MCNC: 85 MG/DL — SIGNIFICANT CHANGE UP (ref 70–99)
GLUCOSE BLDC GLUCOMTR-MCNC: 99 MG/DL — SIGNIFICANT CHANGE UP (ref 70–99)

## 2024-09-15 PROCEDURE — 99232 SBSQ HOSP IP/OBS MODERATE 35: CPT

## 2024-09-15 RX ADMIN — CLINDAMYCIN PHOSPHATE 450 MILLIGRAM(S): 150 INJECTION, SOLUTION INTRAVENOUS at 06:12

## 2024-09-15 RX ADMIN — LABETALOL HYDROCHLORIDE 200 MILLIGRAM(S): 200 TABLET, FILM COATED ORAL at 20:20

## 2024-09-15 RX ADMIN — Medication 5 MILLIGRAM(S): at 20:20

## 2024-09-15 RX ADMIN — ATORVASTATIN CALCIUM 40 MILLIGRAM(S): 10 TABLET, FILM COATED ORAL at 20:21

## 2024-09-15 RX ADMIN — BACITRACIN 1 APPLICATION(S): 500 OINTMENT TOPICAL at 09:45

## 2024-09-15 RX ADMIN — Medication 1 MILLIGRAM(S): at 20:21

## 2024-09-15 RX ADMIN — Medication 0.4 MILLIGRAM(S): at 20:20

## 2024-09-15 RX ADMIN — Medication 17 GRAM(S): at 09:45

## 2024-09-15 RX ADMIN — Medication 2 MILLIGRAM(S): at 17:34

## 2024-09-15 RX ADMIN — Medication 500 MILLIGRAM(S): at 09:45

## 2024-09-15 RX ADMIN — Medication 500 MILLIGRAM(S): at 20:21

## 2024-09-15 RX ADMIN — Medication 50 MICROGRAM(S): at 06:12

## 2024-09-15 RX ADMIN — Medication 10 MILLIGRAM(S): at 09:45

## 2024-09-15 RX ADMIN — CLINDAMYCIN PHOSPHATE 450 MILLIGRAM(S): 150 INJECTION, SOLUTION INTRAVENOUS at 15:34

## 2024-09-15 RX ADMIN — Medication 3 MILLIGRAM(S): at 20:21

## 2024-09-15 NOTE — BH INPATIENT PSYCHIATRY PROGRESS NOTE - NSBHMETABOLIC_PSY_ALL_CORE_FT
BMI: BMI (kg/m2): 30.4 (09-11-24 @ 14:40)  HbA1c: A1C with Estimated Average Glucose Result: 5.4 % (09-12-24 @ 10:48)    Glucose: POCT Blood Glucose.: 85 mg/dL (09-15-24 @ 11:43)    BP: --Vital Signs Last 24 Hrs  T(C): 36.7 (09-15-24 @ 08:49), Max: 36.7 (09-15-24 @ 08:49)  T(F): 98 (09-15-24 @ 08:49), Max: 98 (09-15-24 @ 08:49)  HR: --  BP: --  BP(mean): --  RR: 17 (09-14-24 @ 16:42) (17 - 17)  SpO2: --    Orthostatic VS  09-15-24 @ 08:49  Lying BP: --/-- HR: --  Sitting BP: 112/72 HR: 62  Standing BP: 92/60 HR: 90  Site: --  Mode: --  Orthostatic VS  09-14-24 @ 19:46  Lying BP: --/-- HR: --  Sitting BP: 138/60 HR: 57  Standing BP: 106/62 HR: 65  Site: --  Mode: --  Orthostatic VS  09-14-24 @ 08:31  Lying BP: --/-- HR: --  Sitting BP: 149/72 HR: 62  Standing BP: 116/79 HR: 68  Site: --  Mode: --  Orthostatic VS  09-13-24 @ 19:28  Lying BP: --/-- HR: --  Sitting BP: 125/72 HR: 61  Standing BP: 106/60 HR: 68  Site: --  Mode: --    Lipid Panel: Date/Time: 09-12-24 @ 10:48  Cholesterol, Serum: 74  LDL Cholesterol Calculated: 33  HDL Cholesterol, Serum: 28  Total Cholesterol/HDL Ration Measurement: --  Triglycerides, Serum: 67

## 2024-09-15 NOTE — BH INPATIENT PSYCHIATRY PROGRESS NOTE - NSBHFUPINTERVALHXFT_PSY_A_CORE
Pt compliant with medication and tolerating it well.  Chart reviewed, and pt seen by JEROME overnight for orthostasis.  Pt denies dizziness on interview.  Pt reports he has been keeping to his fluid restriction.  Pt denies feeling depressed.  Pt denies SI/HI/I/P or AH/VH or paranoia.  Pt eating and sleeping well.

## 2024-09-15 NOTE — BH INPATIENT PSYCHIATRY PROGRESS NOTE - NSBHCHARTREVIEWVS_PSY_A_CORE FT
Vital Signs Last 24 Hrs  T(C): 36.7 (09-15-24 @ 08:49), Max: 36.7 (09-15-24 @ 08:49)  T(F): 98 (09-15-24 @ 08:49), Max: 98 (09-15-24 @ 08:49)  HR: --  BP: --  BP(mean): --  RR: 17 (09-14-24 @ 16:42) (17 - 17)  SpO2: --    Orthostatic VS  09-15-24 @ 08:49  Lying BP: --/-- HR: --  Sitting BP: 112/72 HR: 62  Standing BP: 92/60 HR: 90  Site: --  Mode: --  Orthostatic VS  09-14-24 @ 19:46  Lying BP: --/-- HR: --  Sitting BP: 138/60 HR: 57  Standing BP: 106/62 HR: 65  Site: --  Mode: --  Orthostatic VS  09-14-24 @ 08:31  Lying BP: --/-- HR: --  Sitting BP: 149/72 HR: 62  Standing BP: 116/79 HR: 68  Site: --  Mode: --  Orthostatic VS  09-13-24 @ 19:28  Lying BP: --/-- HR: --  Sitting BP: 125/72 HR: 61  Standing BP: 106/60 HR: 68  Site: --  Mode: --

## 2024-09-15 NOTE — BH INPATIENT PSYCHIATRY PROGRESS NOTE - NSBHASSESSSUMMFT_PSY_ALL_CORE
57 yo male w PMHx of schizoaffective disorder (bipolar type with multiple [facility] admissions), HTN, HLD, hypothyroidism, CVID/hypogammaglobulinemia (monthly IVIG, last on 7/23 or 9/9), hx of frequent skin infection (MRSA, abscess/cellulitis w MRSA bacteremia s/p debridement 6/2023) presented to the ED with sisters after appearing unwell admitted for concern for suicide attempt. Psych c/s for SI.     pt presents with chronic anxiety and depression along with paranoia, all which pt has struggled with for many years despite treatment. Chart reviewed regarding medication reconciliation. Wound consult ordered for continued assessment and care.    Treatment Plan  1. admitted to unit, legal status   2. safety  - routine checks as pt denies SIIP/HIIP and is able to contract for safety  - haldol and ativan PO/IM PRN for agitation and anxiety  3. psychiatric  - c/w haldol 5mg for psychosis  - haldol dec 200mg Q3w last given 8/28, next due 9/18  - c/w lexapro 10mg  - past trials: abilify (worsening paranoia)  4. medical  -chronic hyponatremia: Na low 131, f/u BMP in AM ; on fluid restriction < 1.5L   - hypothyroidism: levothyroxine 50mcg  - HLD: lipitor 40mg QHS  - HTN: labetalol 200mg BID, amlodipine 10mg   - DM2: metformin 500mg BID  - MRSA: s/p scalp I/D on 9/8 with recommendations from [facility]   	- c/w clindamycin 450mg PO every 8 hours x 5 days (started 9/10/23)  	- wound consult ordered at [facility] 9/11, recommended bacitracin ointment QD x5 days (started 9/12/24)  - CVID: monthly IVIG (typically Gammagard S/D formula); last dose given 9/9/23 (follows with Dr. Mitchell Boxer)  5. encourage I/G/M therapy  6. dispo  - wound care: [facility]: [phone]. Address: [address] [place] [address]  - psychiatry: Dr Hollie Cook with [facility]

## 2024-09-16 LAB
GLUCOSE BLDC GLUCOMTR-MCNC: 116 MG/DL — HIGH (ref 70–99)
GLUCOSE BLDC GLUCOMTR-MCNC: 122 MG/DL — HIGH (ref 70–99)
GLUCOSE BLDC GLUCOMTR-MCNC: 95 MG/DL — SIGNIFICANT CHANGE UP (ref 70–99)
GLUCOSE BLDC GLUCOMTR-MCNC: 95 MG/DL — SIGNIFICANT CHANGE UP (ref 70–99)

## 2024-09-16 PROCEDURE — 99232 SBSQ HOSP IP/OBS MODERATE 35: CPT | Mod: FS

## 2024-09-16 RX ADMIN — Medication 1 MILLIGRAM(S): at 20:17

## 2024-09-16 RX ADMIN — Medication 3 MILLIGRAM(S): at 20:17

## 2024-09-16 RX ADMIN — Medication 17 GRAM(S): at 08:16

## 2024-09-16 RX ADMIN — Medication 10 MILLIGRAM(S): at 08:16

## 2024-09-16 RX ADMIN — LABETALOL HYDROCHLORIDE 200 MILLIGRAM(S): 200 TABLET, FILM COATED ORAL at 20:18

## 2024-09-16 RX ADMIN — Medication 500 MILLIGRAM(S): at 08:17

## 2024-09-16 RX ADMIN — BACITRACIN 1 APPLICATION(S): 500 OINTMENT TOPICAL at 08:16

## 2024-09-16 RX ADMIN — LABETALOL HYDROCHLORIDE 200 MILLIGRAM(S): 200 TABLET, FILM COATED ORAL at 08:16

## 2024-09-16 RX ADMIN — Medication 50 MICROGRAM(S): at 06:20

## 2024-09-16 RX ADMIN — Medication 5 MILLIGRAM(S): at 20:18

## 2024-09-16 RX ADMIN — Medication 500 MILLIGRAM(S): at 20:17

## 2024-09-16 RX ADMIN — Medication 0.4 MILLIGRAM(S): at 20:16

## 2024-09-16 RX ADMIN — ATORVASTATIN CALCIUM 40 MILLIGRAM(S): 10 TABLET, FILM COATED ORAL at 20:18

## 2024-09-16 NOTE — BH INPATIENT PSYCHIATRY PROGRESS NOTE - CURRENT MEDICATION
MEDICATIONS  (STANDING):  amLODIPine   Tablet 10 milliGRAM(s) Oral daily  atorvastatin 40 milliGRAM(s) Oral at bedtime  bacitracin   Ointment 1 Application(s) Topical daily  benztropine 1 milliGRAM(s) Oral at bedtime  escitalopram 10 milliGRAM(s) Oral daily  haloperidol     Tablet 5 milliGRAM(s) Oral at bedtime  influenza   Vaccine 0.5 milliLiter(s) IntraMuscular once  insulin lispro (ADMELOG) corrective regimen sliding scale   SubCutaneous three times a day before meals  insulin lispro (ADMELOG) corrective regimen sliding scale   SubCutaneous at bedtime  labetalol 200 milliGRAM(s) Oral two times a day  levothyroxine 50 MICROGram(s) Oral daily  metFORMIN 500 milliGRAM(s) Oral two times a day  polyethylene glycol 3350 17 Gram(s) Oral daily  tamsulosin 0.4 milliGRAM(s) Oral at bedtime    MEDICATIONS  (PRN):  acetaminophen     Tablet .. 650 milliGRAM(s) Oral every 6 hours PRN Mild Pain (1 - 3), Moderate Pain (4 - 6)  haloperidol     Tablet 5 milliGRAM(s) Oral every 6 hours PRN agitation  haloperidol    Injectable 5 milliGRAM(s) IntraMuscular once PRN psychotic agitation  hydrOXYzine hydrochloride 50 milliGRAM(s) Oral every 6 hours PRN anxiety  LORazepam     Tablet 2 milliGRAM(s) Oral every 6 hours PRN severe anxiety  LORazepam   Injectable 2 milliGRAM(s) IntraMuscular once PRN psychotic anxiety  melatonin. 3 milliGRAM(s) Oral at bedtime PRN Insomnia   MEDICATIONS  (STANDING):  amLODIPine   Tablet 10 milliGRAM(s) Oral daily  atorvastatin 40 milliGRAM(s) Oral at bedtime  benztropine 1 milliGRAM(s) Oral at bedtime  escitalopram 10 milliGRAM(s) Oral daily  haloperidol     Tablet 5 milliGRAM(s) Oral at bedtime  influenza   Vaccine 0.5 milliLiter(s) IntraMuscular once  insulin lispro (ADMELOG) corrective regimen sliding scale   SubCutaneous three times a day before meals  insulin lispro (ADMELOG) corrective regimen sliding scale   SubCutaneous at bedtime  labetalol 200 milliGRAM(s) Oral two times a day  levothyroxine 50 MICROGram(s) Oral daily  metFORMIN 500 milliGRAM(s) Oral two times a day  polyethylene glycol 3350 17 Gram(s) Oral daily  tamsulosin 0.4 milliGRAM(s) Oral at bedtime    MEDICATIONS  (PRN):  acetaminophen     Tablet .. 650 milliGRAM(s) Oral every 6 hours PRN Mild Pain (1 - 3), Moderate Pain (4 - 6)  haloperidol     Tablet 5 milliGRAM(s) Oral every 6 hours PRN agitation  haloperidol    Injectable 5 milliGRAM(s) IntraMuscular once PRN psychotic agitation  hydrOXYzine hydrochloride 50 milliGRAM(s) Oral every 6 hours PRN anxiety  LORazepam     Tablet 2 milliGRAM(s) Oral every 6 hours PRN severe anxiety  LORazepam   Injectable 2 milliGRAM(s) IntraMuscular once PRN psychotic anxiety  melatonin. 3 milliGRAM(s) Oral at bedtime PRN Insomnia

## 2024-09-16 NOTE — BH INPATIENT PSYCHIATRY PROGRESS NOTE - NSBHMETABOLIC_PSY_ALL_CORE_FT
BMI: BMI (kg/m2): 30.4 (09-11-24 @ 14:40)  HbA1c: A1C with Estimated Average Glucose Result: 5.4 % (09-12-24 @ 10:48)    Glucose: POCT Blood Glucose.: 95 mg/dL (09-16-24 @ 11:30)    BP: --Vital Signs Last 24 Hrs  T(C): 35.2 (09-16-24 @ 08:53), Max: 36.9 (09-15-24 @ 20:00)  T(F): 95.3 (09-16-24 @ 08:53), Max: 98.4 (09-15-24 @ 20:00)  HR: --  BP: --  BP(mean): --  RR: 18 (09-15-24 @ 20:42) (17 - 18)  SpO2: --    Orthostatic VS  09-15-24 @ 20:00  Lying BP: --/-- HR: --  Sitting BP: 129/74 HR: 67  Standing BP: 108/60 HR: 73  Site: --  Mode: --  Orthostatic VS  09-15-24 @ 08:49  Lying BP: --/-- HR: --  Sitting BP: 112/72 HR: 62  Standing BP: 92/60 HR: 90  Site: --  Mode: --  Orthostatic VS  09-14-24 @ 19:46  Lying BP: --/-- HR: --  Sitting BP: 138/60 HR: 57  Standing BP: 106/62 HR: 65  Site: --  Mode: --    Lipid Panel: Date/Time: 09-12-24 @ 10:48  Cholesterol, Serum: 74  LDL Cholesterol Calculated: 33  HDL Cholesterol, Serum: 28  Total Cholesterol/HDL Ration Measurement: --  Triglycerides, Serum: 67   BMI: BMI (kg/m2): 30.4 (09-11-24 @ 14:40)  HbA1c: A1C with Estimated Average Glucose Result: 5.4 % (09-12-24 @ 10:48)    Glucose: POCT Blood Glucose.: 104 mg/dL (09-17-24 @ 11:35)    BP: --Vital Signs Last 24 Hrs  T(C): 36.5 (09-17-24 @ 08:32), Max: 36.9 (09-16-24 @ 19:34)  T(F): 97.7 (09-17-24 @ 08:32), Max: 98.4 (09-16-24 @ 19:34)  HR: --  BP: --  BP(mean): --  RR: 16 (09-17-24 @ 08:32) (16 - 16)  SpO2: --    Orthostatic VS  09-17-24 @ 08:32  Lying BP: --/-- HR: --  Sitting BP: 104/61 HR: 64  Standing BP: 97/52 HR: 67  Site: upper left arm  Mode: electronic  Orthostatic VS  09-16-24 @ 19:34  Lying BP: --/-- HR: --  Sitting BP: 109/58 HR: 61  Standing BP: 99/61 HR: 67  Site: --  Mode: --  Orthostatic VS  09-15-24 @ 20:00  Lying BP: --/-- HR: --  Sitting BP: 129/74 HR: 67  Standing BP: 108/60 HR: 73  Site: --  Mode: --    Lipid Panel: Date/Time: 09-12-24 @ 10:48  Cholesterol, Serum: 74  LDL Cholesterol Calculated: 33  HDL Cholesterol, Serum: 28  Total Cholesterol/HDL Ration Measurement: --  Triglycerides, Serum: 67

## 2024-09-16 NOTE — BH INPATIENT PSYCHIATRY PROGRESS NOTE - NSBHASSESSSUMMFT_PSY_ALL_CORE
59 yo male w PMHx of schizoaffective disorder (bipolar type with multiple [facility] admissions), HTN, HLD, hypothyroidism, CVID/hypogammaglobulinemia (monthly IVIG, last on 7/23 or 9/9), hx of frequent skin infection (MRSA, abscess/cellulitis w MRSA bacteremia s/p debridement 6/2023) presented to the ED with sisters after appearing unwell admitted for concern for suicide attempt. Psych c/s for SI.     pt presents with chronic anxiety and depression along with paranoia, all which pt has struggled with for many years despite treatment. Chart reviewed regarding medication reconciliation. Wound consult ordered for continued assessment and care.    Treatment Plan  1. admitted to unit, legal status   2. safety  - routine checks as pt denies SIIP/HIIP and is able to contract for safety  - haldol and ativan PO/IM PRN for agitation and anxiety  3. psychiatric  - c/w haldol 5mg for psychosis  - haldol dec 200mg Q3w last given 8/28, next due 9/18  - c/w lexapro 10mg  - past trials: abilify (worsening paranoia)  4. medical  -chronic hyponatremia: Na low 131, on fluid restriction < 1.5L   - hypothyroidism: levothyroxine 50mcg  - HLD: lipitor 40mg QHS  - HTN: labetalol 200mg BID, amlodipine 10mg   - DM2: metformin 500mg BID  - MRSA: s/p scalp I/D on 9/8 with recommendations from C/L   	- c/w clindamycin 450mg PO every 8 hours x 5 days (started 9/10/23)  	- wound consult ordered at [facility] 9/11, recommended bacitracin ointment QD x5 days (started 9/12/24)  - CVID: monthly IVIG (typically Gammagard S/D formula); last dose given 9/9/23 (follows with Dr. Mitchell Boxer)  5. encourage I/G/M therapy  6. dispo  - wound care: [facility]: [phone]. Address: [address] [place] [address]  - psychiatry: Dr Hollie Cook with [facility]

## 2024-09-16 NOTE — BH INPATIENT PSYCHIATRY PROGRESS NOTE - NSBHFUPINTERVALHXFT_PSY_A_CORE
Chart and history reviewed and discussed with treatment team. No events reported overnight. Pt seen visible on the unit, keeping to self. Pt voices improvement to mood, denying intrusive thoughts or +AH. Pt states that he feels safe on the unit, denying any paranoia. Pt remains on fluid restrictions of <1.5L, with education provided and pt voicing understanding and agreement. No behavioral issues noted. Medication compliant, tolerating well, free of EPS. Sleep and appetite maintained. Denies SI/HI/AVH. Continue to monitor for safety.

## 2024-09-16 NOTE — BH INPATIENT PSYCHIATRY PROGRESS NOTE - NSBHCHARTREVIEWVS_PSY_A_CORE FT
Vital Signs Last 24 Hrs  T(C): 35.2 (09-16-24 @ 08:53), Max: 36.9 (09-15-24 @ 20:00)  T(F): 95.3 (09-16-24 @ 08:53), Max: 98.4 (09-15-24 @ 20:00)  HR: --  BP: --  BP(mean): --  RR: 18 (09-15-24 @ 20:42) (17 - 18)  SpO2: --    Orthostatic VS  09-15-24 @ 20:00  Lying BP: --/-- HR: --  Sitting BP: 129/74 HR: 67  Standing BP: 108/60 HR: 73  Site: --  Mode: --  Orthostatic VS  09-15-24 @ 08:49  Lying BP: --/-- HR: --  Sitting BP: 112/72 HR: 62  Standing BP: 92/60 HR: 90  Site: --  Mode: --  Orthostatic VS  09-14-24 @ 19:46  Lying BP: --/-- HR: --  Sitting BP: 138/60 HR: 57  Standing BP: 106/62 HR: 65  Site: --  Mode: --   Vital Signs Last 24 Hrs  T(C): 36.5 (09-17-24 @ 08:32), Max: 36.9 (09-16-24 @ 19:34)  T(F): 97.7 (09-17-24 @ 08:32), Max: 98.4 (09-16-24 @ 19:34)  HR: --  BP: --  BP(mean): --  RR: 16 (09-17-24 @ 08:32) (16 - 16)  SpO2: --    Orthostatic VS  09-17-24 @ 08:32  Lying BP: --/-- HR: --  Sitting BP: 104/61 HR: 64  Standing BP: 97/52 HR: 67  Site: upper left arm  Mode: electronic  Orthostatic VS  09-16-24 @ 19:34  Lying BP: --/-- HR: --  Sitting BP: 109/58 HR: 61  Standing BP: 99/61 HR: 67  Site: --  Mode: --  Orthostatic VS  09-15-24 @ 20:00  Lying BP: --/-- HR: --  Sitting BP: 129/74 HR: 67  Standing BP: 108/60 HR: 73  Site: --  Mode: --

## 2024-09-17 LAB
ANION GAP SERPL CALC-SCNC: 13 MMOL/L — SIGNIFICANT CHANGE UP (ref 7–14)
BASOPHILS # BLD AUTO: 0.02 K/UL — SIGNIFICANT CHANGE UP (ref 0–0.2)
BASOPHILS NFR BLD AUTO: 0.6 % — SIGNIFICANT CHANGE UP (ref 0–2)
BUN SERPL-MCNC: 15 MG/DL — SIGNIFICANT CHANGE UP (ref 7–23)
CALCIUM SERPL-MCNC: 9.3 MG/DL — SIGNIFICANT CHANGE UP (ref 8.4–10.5)
CHLORIDE SERPL-SCNC: 93 MMOL/L — LOW (ref 98–107)
CO2 SERPL-SCNC: 24 MMOL/L — SIGNIFICANT CHANGE UP (ref 22–31)
CREAT SERPL-MCNC: 1.06 MG/DL — SIGNIFICANT CHANGE UP (ref 0.5–1.3)
EGFR: 82 ML/MIN/1.73M2 — SIGNIFICANT CHANGE UP
EOSINOPHIL # BLD AUTO: 0.18 K/UL — SIGNIFICANT CHANGE UP (ref 0–0.5)
EOSINOPHIL NFR BLD AUTO: 5.5 % — SIGNIFICANT CHANGE UP (ref 0–6)
GLUCOSE BLDC GLUCOMTR-MCNC: 104 MG/DL — HIGH (ref 70–99)
GLUCOSE BLDC GLUCOMTR-MCNC: 104 MG/DL — HIGH (ref 70–99)
GLUCOSE BLDC GLUCOMTR-MCNC: 131 MG/DL — HIGH (ref 70–99)
GLUCOSE BLDC GLUCOMTR-MCNC: 91 MG/DL — SIGNIFICANT CHANGE UP (ref 70–99)
GLUCOSE SERPL-MCNC: 142 MG/DL — HIGH (ref 70–99)
HCT VFR BLD CALC: 34.3 % — LOW (ref 39–50)
HGB BLD-MCNC: 11.8 G/DL — LOW (ref 13–17)
IANC: 1.74 K/UL — LOW (ref 1.8–7.4)
IMM GRANULOCYTES NFR BLD AUTO: 0.3 % — SIGNIFICANT CHANGE UP (ref 0–0.9)
LYMPHOCYTES # BLD AUTO: 0.9 K/UL — LOW (ref 1–3.3)
LYMPHOCYTES # BLD AUTO: 27.5 % — SIGNIFICANT CHANGE UP (ref 13–44)
MAGNESIUM SERPL-MCNC: 1.9 MG/DL — SIGNIFICANT CHANGE UP (ref 1.6–2.6)
MCHC RBC-ENTMCNC: 28.5 PG — SIGNIFICANT CHANGE UP (ref 27–34)
MCHC RBC-ENTMCNC: 34.4 GM/DL — SIGNIFICANT CHANGE UP (ref 32–36)
MCV RBC AUTO: 82.9 FL — SIGNIFICANT CHANGE UP (ref 80–100)
MONOCYTES # BLD AUTO: 0.42 K/UL — SIGNIFICANT CHANGE UP (ref 0–0.9)
MONOCYTES NFR BLD AUTO: 12.8 % — SIGNIFICANT CHANGE UP (ref 2–14)
NEUTROPHILS # BLD AUTO: 1.74 K/UL — LOW (ref 1.8–7.4)
NEUTROPHILS NFR BLD AUTO: 53.3 % — SIGNIFICANT CHANGE UP (ref 43–77)
NRBC # BLD: 0 /100 WBCS — SIGNIFICANT CHANGE UP (ref 0–0)
NRBC # FLD: 0 K/UL — SIGNIFICANT CHANGE UP (ref 0–0)
PHOSPHATE SERPL-MCNC: 3.8 MG/DL — SIGNIFICANT CHANGE UP (ref 2.5–4.5)
PLATELET # BLD AUTO: 165 K/UL — SIGNIFICANT CHANGE UP (ref 150–400)
POTASSIUM SERPL-MCNC: 4.8 MMOL/L — SIGNIFICANT CHANGE UP (ref 3.5–5.3)
POTASSIUM SERPL-SCNC: 4.8 MMOL/L — SIGNIFICANT CHANGE UP (ref 3.5–5.3)
RBC # BLD: 4.14 M/UL — LOW (ref 4.2–5.8)
RBC # FLD: 14.2 % — SIGNIFICANT CHANGE UP (ref 10.3–14.5)
SODIUM SERPL-SCNC: 130 MMOL/L — LOW (ref 135–145)
WBC # BLD: 3.27 K/UL — LOW (ref 3.8–10.5)
WBC # FLD AUTO: 3.27 K/UL — LOW (ref 3.8–10.5)

## 2024-09-17 PROCEDURE — 90853 GROUP PSYCHOTHERAPY: CPT

## 2024-09-17 PROCEDURE — 99232 SBSQ HOSP IP/OBS MODERATE 35: CPT | Mod: FS

## 2024-09-17 RX ORDER — SENNOSIDES 8.6 MG
2 TABLET ORAL AT BEDTIME
Refills: 0 | Status: DISCONTINUED | OUTPATIENT
Start: 2024-09-17 | End: 2024-09-30

## 2024-09-17 RX ADMIN — ATORVASTATIN CALCIUM 40 MILLIGRAM(S): 10 TABLET, FILM COATED ORAL at 20:01

## 2024-09-17 RX ADMIN — Medication 5 MILLIGRAM(S): at 20:00

## 2024-09-17 RX ADMIN — Medication 3 MILLIGRAM(S): at 20:00

## 2024-09-17 RX ADMIN — Medication 0.4 MILLIGRAM(S): at 20:01

## 2024-09-17 RX ADMIN — Medication 2 TABLET(S): at 20:01

## 2024-09-17 RX ADMIN — LABETALOL HYDROCHLORIDE 200 MILLIGRAM(S): 200 TABLET, FILM COATED ORAL at 20:01

## 2024-09-17 RX ADMIN — LABETALOL HYDROCHLORIDE 200 MILLIGRAM(S): 200 TABLET, FILM COATED ORAL at 08:18

## 2024-09-17 RX ADMIN — Medication 1 MILLIGRAM(S): at 20:01

## 2024-09-17 RX ADMIN — Medication 50 MICROGRAM(S): at 06:24

## 2024-09-17 RX ADMIN — Medication 500 MILLIGRAM(S): at 08:18

## 2024-09-17 RX ADMIN — Medication 10 MILLIGRAM(S): at 08:19

## 2024-09-17 RX ADMIN — Medication 10 MILLIGRAM(S): at 08:18

## 2024-09-17 RX ADMIN — Medication 17 GRAM(S): at 08:18

## 2024-09-17 RX ADMIN — Medication 500 MILLIGRAM(S): at 20:01

## 2024-09-17 RX ADMIN — Medication 5 MILLIGRAM(S): at 21:34

## 2024-09-17 NOTE — BH INPATIENT PSYCHIATRY PROGRESS NOTE - NSBHMETABOLIC_PSY_ALL_CORE_FT
BMI: BMI (kg/m2): 30.4 (09-11-24 @ 14:40)  HbA1c: A1C with Estimated Average Glucose Result: 5.4 % (09-12-24 @ 10:48)    Glucose: POCT Blood Glucose.: 104 mg/dL (09-17-24 @ 11:35)    BP: --Vital Signs Last 24 Hrs  T(C): 36.5 (09-17-24 @ 08:32), Max: 36.9 (09-16-24 @ 19:34)  T(F): 97.7 (09-17-24 @ 08:32), Max: 98.4 (09-16-24 @ 19:34)  HR: --  BP: --  BP(mean): --  RR: 16 (09-17-24 @ 08:32) (16 - 16)  SpO2: --    Orthostatic VS  09-17-24 @ 08:32  Lying BP: --/-- HR: --  Sitting BP: 104/61 HR: 64  Standing BP: 97/52 HR: 67  Site: upper left arm  Mode: electronic  Orthostatic VS  09-16-24 @ 19:34  Lying BP: --/-- HR: --  Sitting BP: 109/58 HR: 61  Standing BP: 99/61 HR: 67  Site: --  Mode: --  Orthostatic VS  09-15-24 @ 20:00  Lying BP: --/-- HR: --  Sitting BP: 129/74 HR: 67  Standing BP: 108/60 HR: 73  Site: --  Mode: --    Lipid Panel: Date/Time: 09-12-24 @ 10:48  Cholesterol, Serum: 74  LDL Cholesterol Calculated: 33  HDL Cholesterol, Serum: 28  Total Cholesterol/HDL Ration Measurement: --  Triglycerides, Serum: 67   BMI: BMI (kg/m2): 30.4 (09-11-24 @ 14:40)  HbA1c: A1C with Estimated Average Glucose Result: 5.4 % (09-12-24 @ 10:48)    Glucose: POCT Blood Glucose.: 107 mg/dL (09-18-24 @ 07:48)    BP: 120/64 (09-18-24 @ 08:40) (120/64 - 120/64)Vital Signs Last 24 Hrs  T(C): 36.4 (09-18-24 @ 08:20), Max: 36.4 (09-18-24 @ 08:20)  T(F): 97.6 (09-18-24 @ 08:20), Max: 97.6 (09-18-24 @ 08:20)  HR: --  BP: 120/64 (09-18-24 @ 08:40) (120/64 - 120/64)  BP(mean): 62 (09-18-24 @ 08:40) (62 - 62)  RR: 16 (09-17-24 @ 19:35) (16 - 16)  SpO2: --    Orthostatic VS  09-18-24 @ 08:20  Lying BP: --/-- HR: --  Sitting BP: 92/62 HR: 69  Standing BP: 101/64 HR: 72  Site: --  Mode: --  Orthostatic VS  09-17-24 @ 19:35  Lying BP: --/-- HR: --  Sitting BP: 129/67 HR: 57  Standing BP: 102/67 HR: 64  Site: --  Mode: --  Orthostatic VS  09-17-24 @ 08:32  Lying BP: --/-- HR: --  Sitting BP: 104/61 HR: 64  Standing BP: 97/52 HR: 67  Site: upper left arm  Mode: electronic  Orthostatic VS  09-16-24 @ 19:34  Lying BP: --/-- HR: --  Sitting BP: 109/58 HR: 61  Standing BP: 99/61 HR: 67  Site: --  Mode: --    Lipid Panel: Date/Time: 09-12-24 @ 10:48  Cholesterol, Serum: 74  LDL Cholesterol Calculated: 33  HDL Cholesterol, Serum: 28  Total Cholesterol/HDL Ration Measurement: --  Triglycerides, Serum: 67

## 2024-09-17 NOTE — BH INPATIENT PSYCHIATRY PROGRESS NOTE - CURRENT MEDICATION
MEDICATIONS  (STANDING):  amLODIPine   Tablet 10 milliGRAM(s) Oral daily  atorvastatin 40 milliGRAM(s) Oral at bedtime  benztropine 1 milliGRAM(s) Oral at bedtime  escitalopram 10 milliGRAM(s) Oral daily  haloperidol     Tablet 5 milliGRAM(s) Oral at bedtime  influenza   Vaccine 0.5 milliLiter(s) IntraMuscular once  insulin lispro (ADMELOG) corrective regimen sliding scale   SubCutaneous three times a day before meals  insulin lispro (ADMELOG) corrective regimen sliding scale   SubCutaneous at bedtime  labetalol 200 milliGRAM(s) Oral two times a day  levothyroxine 50 MICROGram(s) Oral daily  metFORMIN 500 milliGRAM(s) Oral two times a day  polyethylene glycol 3350 17 Gram(s) Oral daily  tamsulosin 0.4 milliGRAM(s) Oral at bedtime    MEDICATIONS  (PRN):  acetaminophen     Tablet .. 650 milliGRAM(s) Oral every 6 hours PRN Mild Pain (1 - 3), Moderate Pain (4 - 6)  haloperidol     Tablet 5 milliGRAM(s) Oral every 6 hours PRN agitation  haloperidol    Injectable 5 milliGRAM(s) IntraMuscular once PRN psychotic agitation  hydrOXYzine hydrochloride 50 milliGRAM(s) Oral every 6 hours PRN anxiety  LORazepam     Tablet 2 milliGRAM(s) Oral every 6 hours PRN severe anxiety  LORazepam   Injectable 2 milliGRAM(s) IntraMuscular once PRN psychotic anxiety  melatonin. 3 milliGRAM(s) Oral at bedtime PRN Insomnia   MEDICATIONS  (STANDING):  amLODIPine   Tablet 10 milliGRAM(s) Oral daily  atorvastatin 40 milliGRAM(s) Oral at bedtime  benztropine 1 milliGRAM(s) Oral at bedtime  escitalopram 10 milliGRAM(s) Oral daily  haloperidol     Tablet 5 milliGRAM(s) Oral at bedtime  influenza   Vaccine 0.5 milliLiter(s) IntraMuscular once  insulin lispro (ADMELOG) corrective regimen sliding scale   SubCutaneous three times a day before meals  insulin lispro (ADMELOG) corrective regimen sliding scale   SubCutaneous at bedtime  labetalol 200 milliGRAM(s) Oral two times a day  levothyroxine 50 MICROGram(s) Oral daily  metFORMIN 500 milliGRAM(s) Oral two times a day  polyethylene glycol 3350 17 Gram(s) Oral daily  senna 2 Tablet(s) Oral at bedtime  tamsulosin 0.4 milliGRAM(s) Oral at bedtime    MEDICATIONS  (PRN):  acetaminophen     Tablet .. 650 milliGRAM(s) Oral every 6 hours PRN Mild Pain (1 - 3), Moderate Pain (4 - 6)  haloperidol     Tablet 5 milliGRAM(s) Oral every 6 hours PRN agitation  haloperidol    Injectable 5 milliGRAM(s) IntraMuscular once PRN psychotic agitation  hydrOXYzine hydrochloride 50 milliGRAM(s) Oral every 6 hours PRN anxiety  LORazepam     Tablet 2 milliGRAM(s) Oral every 6 hours PRN severe anxiety  LORazepam   Injectable 2 milliGRAM(s) IntraMuscular once PRN psychotic anxiety  melatonin. 3 milliGRAM(s) Oral at bedtime PRN Insomnia

## 2024-09-17 NOTE — BH INPATIENT PSYCHIATRY PROGRESS NOTE - NSBHFUPINTERVALHXFT_PSY_A_CORE
f/u for chronic SI and paranoia. case discussed with treatment team. vitals stable. labs reviewed--Na 130, spoke to hospitalist Dr. Giordano, No events reported overnight.  Pt remains on fluid restrictions of <1.5L, with education provided and pt voicing understanding and agreement. No behavioral issues noted. Reported that he was admitted due to taking pills as he was experiencing worsening paranoia that was not going away. Patient reported that he did call his sisters when experiencing this and they were trying to talk him out of it. Patient now stating that he is figuring out that lying down is helping to decrease sx. Patient c/o constipation, LBM 2 days ago, will add senna. reported sleeping well and with good appetite.

## 2024-09-17 NOTE — BH INPATIENT PSYCHIATRY PROGRESS NOTE - NSBHCHARTREVIEWVS_PSY_A_CORE FT
Vital Signs Last 24 Hrs  T(C): 36.5 (09-17-24 @ 08:32), Max: 36.9 (09-16-24 @ 19:34)  T(F): 97.7 (09-17-24 @ 08:32), Max: 98.4 (09-16-24 @ 19:34)  HR: --  BP: --  BP(mean): --  RR: 16 (09-17-24 @ 08:32) (16 - 16)  SpO2: --    Orthostatic VS  09-17-24 @ 08:32  Lying BP: --/-- HR: --  Sitting BP: 104/61 HR: 64  Standing BP: 97/52 HR: 67  Site: upper left arm  Mode: electronic  Orthostatic VS  09-16-24 @ 19:34  Lying BP: --/-- HR: --  Sitting BP: 109/58 HR: 61  Standing BP: 99/61 HR: 67  Site: --  Mode: --  Orthostatic VS  09-15-24 @ 20:00  Lying BP: --/-- HR: --  Sitting BP: 129/74 HR: 67  Standing BP: 108/60 HR: 73  Site: --  Mode: --   Vital Signs Last 24 Hrs  T(C): 36.4 (09-18-24 @ 08:20), Max: 36.4 (09-18-24 @ 08:20)  T(F): 97.6 (09-18-24 @ 08:20), Max: 97.6 (09-18-24 @ 08:20)  HR: --  BP: 120/64 (09-18-24 @ 08:40) (120/64 - 120/64)  BP(mean): 62 (09-18-24 @ 08:40) (62 - 62)  RR: 16 (09-17-24 @ 19:35) (16 - 16)  SpO2: --    Orthostatic VS  09-18-24 @ 08:20  Lying BP: --/-- HR: --  Sitting BP: 92/62 HR: 69  Standing BP: 101/64 HR: 72  Site: --  Mode: --  Orthostatic VS  09-17-24 @ 19:35  Lying BP: --/-- HR: --  Sitting BP: 129/67 HR: 57  Standing BP: 102/67 HR: 64  Site: --  Mode: --  Orthostatic VS  09-17-24 @ 08:32  Lying BP: --/-- HR: --  Sitting BP: 104/61 HR: 64  Standing BP: 97/52 HR: 67  Site: upper left arm  Mode: electronic  Orthostatic VS  09-16-24 @ 19:34  Lying BP: --/-- HR: --  Sitting BP: 109/58 HR: 61  Standing BP: 99/61 HR: 67  Site: --  Mode: --

## 2024-09-17 NOTE — BH INPATIENT PSYCHIATRY PROGRESS NOTE - NSBHASSESSSUMMFT_PSY_ALL_CORE
57 yo male w PMHx of schizoaffective disorder (bipolar type with multiple [facility] admissions), HTN, HLD, hypothyroidism, CVID/hypogammaglobulinemia (monthly IVIG, last on 7/23 or 9/9), hx of frequent skin infection (MRSA, abscess/cellulitis w MRSA bacteremia s/p debridement 6/2023) presented to the ED with sisters after appearing unwell admitted for concern for suicide attempt. Psych c/s for SI.     on assessment, patient appears brighter, however patient can become impulsive under stressful circumstances and with elevated paranoia, haldol decanoate 200mg IM ordered for 9/18    Treatment Plan  1. admitted to unit, legal status   2. safety  - routine checks as pt denies SIIP/HIIP and is able to contract for safety  - haldol and ativan PO/IM PRN for agitation and anxiety  3. psychiatric  - c/w haldol 5mg for psychosis  - haldol dec 200mg Q3w last given 8/28, next due 9/18  - c/w lexapro 10mg  - past trials: abilify (worsening paranoia)  4. medical  -chronic hyponatremia: Na low 131, on fluid restriction < 1.5L   - hypothyroidism: levothyroxine 50mcg  - HLD: lipitor 40mg QHS  - HTN: labetalol 200mg BID, amlodipine 10mg   - DM2: metformin 500mg BID  - MRSA: s/p scalp I/D on 9/8 with recommendations from C/L   	- c/w clindamycin 450mg PO every 8 hours x 5 days (started 9/10/23)  	- wound consult ordered at [facility] 9/11, recommended bacitracin ointment QD x5 days (started 9/12/24)  - CVID: monthly IVIG (typically Gammagard S/D formula); last dose given 9/9/23 (follows with Dr. Mitchell Boxer)  5. encourage I/G/M therapy  6. dispo  - wound care: [facility]: [phone]. Address: [address] [place] [address]  - psychiatry: Dr Hollie Cook with [facility]

## 2024-09-18 LAB
GLUCOSE BLDC GLUCOMTR-MCNC: 107 MG/DL — HIGH (ref 70–99)
GLUCOSE BLDC GLUCOMTR-MCNC: 89 MG/DL — SIGNIFICANT CHANGE UP (ref 70–99)
GLUCOSE BLDC GLUCOMTR-MCNC: 91 MG/DL — SIGNIFICANT CHANGE UP (ref 70–99)
GLUCOSE BLDC GLUCOMTR-MCNC: 93 MG/DL — SIGNIFICANT CHANGE UP (ref 70–99)
SURGICAL PATHOLOGY STUDY: SIGNIFICANT CHANGE UP

## 2024-09-18 PROCEDURE — 99232 SBSQ HOSP IP/OBS MODERATE 35: CPT | Mod: FS

## 2024-09-18 RX ORDER — HALOPERIDOL LACTATE 2 MG/ML
200 CONCENTRATE, ORAL ORAL ONCE
Refills: 0 | Status: DISCONTINUED | OUTPATIENT
Start: 2024-09-18 | End: 2024-09-18

## 2024-09-18 RX ORDER — BISACODYL 5 MG/1
5 TABLET, COATED ORAL EVERY 12 HOURS
Refills: 0 | Status: DISCONTINUED | OUTPATIENT
Start: 2024-09-18 | End: 2024-09-30

## 2024-09-18 RX ADMIN — Medication 17 GRAM(S): at 08:45

## 2024-09-18 RX ADMIN — ATORVASTATIN CALCIUM 40 MILLIGRAM(S): 10 TABLET, FILM COATED ORAL at 20:22

## 2024-09-18 RX ADMIN — Medication 3 MILLIGRAM(S): at 20:22

## 2024-09-18 RX ADMIN — Medication 5 MILLIGRAM(S): at 20:23

## 2024-09-18 RX ADMIN — Medication 1 MILLIGRAM(S): at 20:22

## 2024-09-18 RX ADMIN — Medication 500 MILLIGRAM(S): at 08:45

## 2024-09-18 RX ADMIN — LABETALOL HYDROCHLORIDE 200 MILLIGRAM(S): 200 TABLET, FILM COATED ORAL at 08:45

## 2024-09-18 RX ADMIN — Medication 200 MILLIGRAM(S): at 10:41

## 2024-09-18 RX ADMIN — Medication 0.4 MILLIGRAM(S): at 20:22

## 2024-09-18 RX ADMIN — LABETALOL HYDROCHLORIDE 200 MILLIGRAM(S): 200 TABLET, FILM COATED ORAL at 20:22

## 2024-09-18 RX ADMIN — Medication 50 MICROGRAM(S): at 06:09

## 2024-09-18 RX ADMIN — Medication 500 MILLIGRAM(S): at 20:22

## 2024-09-18 RX ADMIN — Medication 2 TABLET(S): at 20:23

## 2024-09-18 RX ADMIN — Medication 10 MILLIGRAM(S): at 08:45

## 2024-09-18 NOTE — BH INPATIENT PSYCHIATRY PROGRESS NOTE - NSBHFUPINTERVALHXFT_PSY_A_CORE
Chart and history reviewed and discussed with treatment team. No events reported overnight. Seen with SW and SW student. Pt reports continued improvement to mood, denying any intrusive thoughts, paranoia, or depressed mood. Haldol dec 200mg ordered and administered without issue. Fluid restriction reviewed, at 1.5L for Na 130. Pt voiced understanding to education. No behavioral issues noted. Medication compliant, tolerating well, free of EPS. Sleep and appetite maintained. Denies SI/HI/AVH. Continue to monitor for safety.     NP and SW left message for pt's sister, Brittany.

## 2024-09-18 NOTE — BH INPATIENT PSYCHIATRY PROGRESS NOTE - CURRENT MEDICATION
MEDICATIONS  (STANDING):  amLODIPine   Tablet 10 milliGRAM(s) Oral daily  atorvastatin 40 milliGRAM(s) Oral at bedtime  benztropine 1 milliGRAM(s) Oral at bedtime  escitalopram 10 milliGRAM(s) Oral daily  haloperidol     Tablet 5 milliGRAM(s) Oral at bedtime  influenza   Vaccine 0.5 milliLiter(s) IntraMuscular once  insulin lispro (ADMELOG) corrective regimen sliding scale   SubCutaneous three times a day before meals  insulin lispro (ADMELOG) corrective regimen sliding scale   SubCutaneous at bedtime  labetalol 200 milliGRAM(s) Oral two times a day  levothyroxine 50 MICROGram(s) Oral daily  metFORMIN 500 milliGRAM(s) Oral two times a day  polyethylene glycol 3350 17 Gram(s) Oral daily  senna 2 Tablet(s) Oral at bedtime  tamsulosin 0.4 milliGRAM(s) Oral at bedtime    MEDICATIONS  (PRN):  acetaminophen     Tablet .. 650 milliGRAM(s) Oral every 6 hours PRN Mild Pain (1 - 3), Moderate Pain (4 - 6)  haloperidol     Tablet 5 milliGRAM(s) Oral every 6 hours PRN agitation  haloperidol    Injectable 5 milliGRAM(s) IntraMuscular once PRN psychotic agitation  hydrOXYzine hydrochloride 50 milliGRAM(s) Oral every 6 hours PRN anxiety  LORazepam     Tablet 2 milliGRAM(s) Oral every 6 hours PRN severe anxiety  LORazepam   Injectable 2 milliGRAM(s) IntraMuscular once PRN psychotic anxiety  melatonin. 3 milliGRAM(s) Oral at bedtime PRN Insomnia   MEDICATIONS  (STANDING):  amLODIPine   Tablet 10 milliGRAM(s) Oral daily  atorvastatin 40 milliGRAM(s) Oral at bedtime  benztropine 1 milliGRAM(s) Oral at bedtime  escitalopram 10 milliGRAM(s) Oral daily  haloperidol     Tablet 5 milliGRAM(s) Oral at bedtime  influenza   Vaccine 0.5 milliLiter(s) IntraMuscular once  insulin lispro (ADMELOG) corrective regimen sliding scale   SubCutaneous three times a day before meals  insulin lispro (ADMELOG) corrective regimen sliding scale   SubCutaneous at bedtime  labetalol 200 milliGRAM(s) Oral two times a day  levothyroxine 50 MICROGram(s) Oral daily  metFORMIN 500 milliGRAM(s) Oral two times a day  polyethylene glycol 3350 17 Gram(s) Oral daily  senna 2 Tablet(s) Oral at bedtime  tamsulosin 0.4 milliGRAM(s) Oral at bedtime    MEDICATIONS  (PRN):  acetaminophen     Tablet .. 650 milliGRAM(s) Oral every 6 hours PRN Mild Pain (1 - 3), Moderate Pain (4 - 6)  bisacodyl 5 milliGRAM(s) Oral every 12 hours PRN Constipation  haloperidol     Tablet 5 milliGRAM(s) Oral every 6 hours PRN agitation  haloperidol    Injectable 5 milliGRAM(s) IntraMuscular once PRN psychotic agitation  hydrOXYzine hydrochloride 50 milliGRAM(s) Oral every 6 hours PRN anxiety  LORazepam     Tablet 2 milliGRAM(s) Oral every 6 hours PRN severe anxiety  LORazepam   Injectable 2 milliGRAM(s) IntraMuscular once PRN psychotic anxiety  melatonin. 3 milliGRAM(s) Oral at bedtime PRN Insomnia

## 2024-09-18 NOTE — BH INPATIENT PSYCHIATRY PROGRESS NOTE - PRN MEDS
MEDICATIONS  (PRN):  acetaminophen     Tablet .. 650 milliGRAM(s) Oral every 6 hours PRN Mild Pain (1 - 3), Moderate Pain (4 - 6)  haloperidol     Tablet 5 milliGRAM(s) Oral every 6 hours PRN agitation  haloperidol    Injectable 5 milliGRAM(s) IntraMuscular once PRN psychotic agitation  hydrOXYzine hydrochloride 50 milliGRAM(s) Oral every 6 hours PRN anxiety  LORazepam     Tablet 2 milliGRAM(s) Oral every 6 hours PRN severe anxiety  LORazepam   Injectable 2 milliGRAM(s) IntraMuscular once PRN psychotic anxiety  melatonin. 3 milliGRAM(s) Oral at bedtime PRN Insomnia   MEDICATIONS  (PRN):  acetaminophen     Tablet .. 650 milliGRAM(s) Oral every 6 hours PRN Mild Pain (1 - 3), Moderate Pain (4 - 6)  bisacodyl 5 milliGRAM(s) Oral every 12 hours PRN Constipation  haloperidol     Tablet 5 milliGRAM(s) Oral every 6 hours PRN agitation  haloperidol    Injectable 5 milliGRAM(s) IntraMuscular once PRN psychotic agitation  hydrOXYzine hydrochloride 50 milliGRAM(s) Oral every 6 hours PRN anxiety  LORazepam     Tablet 2 milliGRAM(s) Oral every 6 hours PRN severe anxiety  LORazepam   Injectable 2 milliGRAM(s) IntraMuscular once PRN psychotic anxiety  melatonin. 3 milliGRAM(s) Oral at bedtime PRN Insomnia

## 2024-09-18 NOTE — BH INPATIENT PSYCHIATRY PROGRESS NOTE - NSBHCHARTREVIEWVS_PSY_A_CORE FT
Vital Signs Last 24 Hrs  T(C): 36.4 (09-18-24 @ 08:20), Max: 36.4 (09-18-24 @ 08:20)  T(F): 97.6 (09-18-24 @ 08:20), Max: 97.6 (09-18-24 @ 08:20)  HR: --  BP: 120/64 (09-18-24 @ 08:40) (120/64 - 120/64)  BP(mean): 62 (09-18-24 @ 08:40) (62 - 62)  RR: 16 (09-18-24 @ 08:40) (16 - 16)  SpO2: --    Orthostatic VS  09-18-24 @ 08:20  Lying BP: --/-- HR: --  Sitting BP: 92/62 HR: 69  Standing BP: 101/64 HR: 72  Site: --  Mode: --  Orthostatic VS  09-17-24 @ 19:35  Lying BP: --/-- HR: --  Sitting BP: 129/67 HR: 57  Standing BP: 102/67 HR: 64  Site: --  Mode: --  Orthostatic VS  09-17-24 @ 08:32  Lying BP: --/-- HR: --  Sitting BP: 104/61 HR: 64  Standing BP: 97/52 HR: 67  Site: upper left arm  Mode: electronic  Orthostatic VS  09-16-24 @ 19:34  Lying BP: --/-- HR: --  Sitting BP: 109/58 HR: 61  Standing BP: 99/61 HR: 67  Site: --  Mode: --   Vital Signs Last 24 Hrs  T(C): 36.8 (09-19-24 @ 08:16), Max: 36.8 (09-19-24 @ 08:16)  T(F): 98.2 (09-19-24 @ 08:16), Max: 98.2 (09-19-24 @ 08:16)  HR: --  BP: 86/59 (09-19-24 @ 08:16) (86/59 - 86/59)  BP(mean): 67 (09-19-24 @ 08:16) (67 - 67)  RR: --  SpO2: --    Orthostatic VS  09-18-24 @ 19:10  Lying BP: --/-- HR: --  Sitting BP: 117/64 HR: 66  Standing BP: 106/62 HR: 61  Site: --  Mode: --  Orthostatic VS  09-18-24 @ 08:20  Lying BP: --/-- HR: --  Sitting BP: 92/62 HR: 69  Standing BP: 101/64 HR: 72  Site: --  Mode: --  Orthostatic VS  09-17-24 @ 19:35  Lying BP: --/-- HR: --  Sitting BP: 129/67 HR: 57  Standing BP: 102/67 HR: 64  Site: --  Mode: --

## 2024-09-18 NOTE — BH INPATIENT PSYCHIATRY PROGRESS NOTE - NSBHASSESSSUMMFT_PSY_ALL_CORE
59 yo male w PMHx of schizoaffective disorder (bipolar type with multiple [facility] admissions), HTN, HLD, hypothyroidism, CVID/hypogammaglobulinemia (monthly IVIG, last on 7/23 or 9/9), hx of frequent skin infection (MRSA, abscess/cellulitis w MRSA bacteremia s/p debridement 6/2023) presented to the ED with sisters after appearing unwell admitted for concern for suicide attempt. Psych c/s for SI.     on assessment, patient appears brighter, however patient can become impulsive under stressful circumstances and with elevated paranoia, haldol decanoate 200mg IM ordered for 9/18    Treatment Plan  1. admitted to unit, legal status   2. safety  - routine checks as pt denies SIIP/HIIP and is able to contract for safety  - haldol and ativan PO/IM PRN for agitation and anxiety  3. psychiatric  - c/w haldol 5mg for psychosis  - haldol dec 200mg Q3w last given 9/18, next due 10/16  - c/w lexapro 10mg  - past trials: abilify (worsening paranoia)  4. medical  -chronic hyponatremia: Na low 131, on fluid restriction < 1.5L   - hypothyroidism: levothyroxine 50mcg  - HLD: lipitor 40mg QHS  - HTN: labetalol 200mg BID, amlodipine 10mg   - DM2: metformin 500mg BID  - MRSA: s/p scalp I/D on 9/8 with recommendations from C/L   	- c/w clindamycin 450mg PO every 8 hours x 5 days (started 9/10/23)  	- wound consult ordered at [facility] 9/11, recommended bacitracin ointment QD x5 days (started 9/12/24)  - CVID: monthly IVIG (typically Gammagard S/D formula); last dose given 9/9/23 (follows with Dr. Mitchell Boxer)  5. encourage I/G/M therapy  6. dispo  - wound care: [facility]: [phone]. Address: [address] [place] [address]  - psychiatry: Dr Hollie Cook with [facility]

## 2024-09-18 NOTE — BH INPATIENT PSYCHIATRY PROGRESS NOTE - NSBHMETABOLIC_PSY_ALL_CORE_FT
BMI: BMI (kg/m2): 30.4 (09-11-24 @ 14:40)  HbA1c: A1C with Estimated Average Glucose Result: 5.4 % (09-12-24 @ 10:48)    Glucose: POCT Blood Glucose.: 93 mg/dL (09-18-24 @ 11:29)    BP: 120/64 (09-18-24 @ 08:40) (120/64 - 120/64)Vital Signs Last 24 Hrs  T(C): 36.4 (09-18-24 @ 08:20), Max: 36.4 (09-18-24 @ 08:20)  T(F): 97.6 (09-18-24 @ 08:20), Max: 97.6 (09-18-24 @ 08:20)  HR: --  BP: 120/64 (09-18-24 @ 08:40) (120/64 - 120/64)  BP(mean): 62 (09-18-24 @ 08:40) (62 - 62)  RR: 16 (09-18-24 @ 08:40) (16 - 16)  SpO2: --    Orthostatic VS  09-18-24 @ 08:20  Lying BP: --/-- HR: --  Sitting BP: 92/62 HR: 69  Standing BP: 101/64 HR: 72  Site: --  Mode: --  Orthostatic VS  09-17-24 @ 19:35  Lying BP: --/-- HR: --  Sitting BP: 129/67 HR: 57  Standing BP: 102/67 HR: 64  Site: --  Mode: --  Orthostatic VS  09-17-24 @ 08:32  Lying BP: --/-- HR: --  Sitting BP: 104/61 HR: 64  Standing BP: 97/52 HR: 67  Site: upper left arm  Mode: electronic  Orthostatic VS  09-16-24 @ 19:34  Lying BP: --/-- HR: --  Sitting BP: 109/58 HR: 61  Standing BP: 99/61 HR: 67  Site: --  Mode: --    Lipid Panel: Date/Time: 09-12-24 @ 10:48  Cholesterol, Serum: 74  LDL Cholesterol Calculated: 33  HDL Cholesterol, Serum: 28  Total Cholesterol/HDL Ration Measurement: --  Triglycerides, Serum: 67   BMI: BMI (kg/m2): 30.4 (09-11-24 @ 14:40)  HbA1c: A1C with Estimated Average Glucose Result: 5.4 % (09-12-24 @ 10:48)    Glucose: POCT Blood Glucose.: 114 mg/dL (09-19-24 @ 07:58)    BP: 86/59 (09-19-24 @ 08:16) (86/59 - 120/64)Vital Signs Last 24 Hrs  T(C): 36.8 (09-19-24 @ 08:16), Max: 36.8 (09-19-24 @ 08:16)  T(F): 98.2 (09-19-24 @ 08:16), Max: 98.2 (09-19-24 @ 08:16)  HR: --  BP: 86/59 (09-19-24 @ 08:16) (86/59 - 86/59)  BP(mean): 67 (09-19-24 @ 08:16) (67 - 67)  RR: --  SpO2: --    Orthostatic VS  09-18-24 @ 19:10  Lying BP: --/-- HR: --  Sitting BP: 117/64 HR: 66  Standing BP: 106/62 HR: 61  Site: --  Mode: --  Orthostatic VS  09-18-24 @ 08:20  Lying BP: --/-- HR: --  Sitting BP: 92/62 HR: 69  Standing BP: 101/64 HR: 72  Site: --  Mode: --  Orthostatic VS  09-17-24 @ 19:35  Lying BP: --/-- HR: --  Sitting BP: 129/67 HR: 57  Standing BP: 102/67 HR: 64  Site: --  Mode: --    Lipid Panel: Date/Time: 09-12-24 @ 10:48  Cholesterol, Serum: 74  LDL Cholesterol Calculated: 33  HDL Cholesterol, Serum: 28  Total Cholesterol/HDL Ration Measurement: --  Triglycerides, Serum: 67

## 2024-09-19 ENCOUNTER — NON-APPOINTMENT (OUTPATIENT)
Age: 59
End: 2024-09-19

## 2024-09-19 LAB
ANION GAP SERPL CALC-SCNC: 16 MMOL/L — HIGH (ref 7–14)
BASOPHILS # BLD AUTO: 0.02 K/UL — SIGNIFICANT CHANGE UP (ref 0–0.2)
BASOPHILS NFR BLD AUTO: 0.6 % — SIGNIFICANT CHANGE UP (ref 0–2)
BUN SERPL-MCNC: 17 MG/DL — SIGNIFICANT CHANGE UP (ref 7–23)
CALCIUM SERPL-MCNC: 9.8 MG/DL — SIGNIFICANT CHANGE UP (ref 8.4–10.5)
CHLORIDE SERPL-SCNC: 97 MMOL/L — LOW (ref 98–107)
CO2 SERPL-SCNC: 22 MMOL/L — SIGNIFICANT CHANGE UP (ref 22–31)
CREAT SERPL-MCNC: 1.18 MG/DL — SIGNIFICANT CHANGE UP (ref 0.5–1.3)
EGFR: 72 ML/MIN/1.73M2 — SIGNIFICANT CHANGE UP
EOSINOPHIL # BLD AUTO: 0.18 K/UL — SIGNIFICANT CHANGE UP (ref 0–0.5)
EOSINOPHIL NFR BLD AUTO: 5.1 % — SIGNIFICANT CHANGE UP (ref 0–6)
GLUCOSE BLDC GLUCOMTR-MCNC: 107 MG/DL — HIGH (ref 70–99)
GLUCOSE BLDC GLUCOMTR-MCNC: 114 MG/DL — HIGH (ref 70–99)
GLUCOSE BLDC GLUCOMTR-MCNC: 95 MG/DL — SIGNIFICANT CHANGE UP (ref 70–99)
GLUCOSE BLDC GLUCOMTR-MCNC: 96 MG/DL — SIGNIFICANT CHANGE UP (ref 70–99)
GLUCOSE SERPL-MCNC: 147 MG/DL — HIGH (ref 70–99)
HCT VFR BLD CALC: 37.3 % — LOW (ref 39–50)
HGB BLD-MCNC: 12.5 G/DL — LOW (ref 13–17)
IANC: 1.93 K/UL — SIGNIFICANT CHANGE UP (ref 1.8–7.4)
IMM GRANULOCYTES NFR BLD AUTO: 0.3 % — SIGNIFICANT CHANGE UP (ref 0–0.9)
LYMPHOCYTES # BLD AUTO: 1.04 K/UL — SIGNIFICANT CHANGE UP (ref 1–3.3)
LYMPHOCYTES # BLD AUTO: 29.3 % — SIGNIFICANT CHANGE UP (ref 13–44)
MAGNESIUM SERPL-MCNC: 2.1 MG/DL — SIGNIFICANT CHANGE UP (ref 1.6–2.6)
MCHC RBC-ENTMCNC: 28.6 PG — SIGNIFICANT CHANGE UP (ref 27–34)
MCHC RBC-ENTMCNC: 33.5 GM/DL — SIGNIFICANT CHANGE UP (ref 32–36)
MCV RBC AUTO: 85.4 FL — SIGNIFICANT CHANGE UP (ref 80–100)
MONOCYTES # BLD AUTO: 0.37 K/UL — SIGNIFICANT CHANGE UP (ref 0–0.9)
MONOCYTES NFR BLD AUTO: 10.4 % — SIGNIFICANT CHANGE UP (ref 2–14)
NEUTROPHILS # BLD AUTO: 1.93 K/UL — SIGNIFICANT CHANGE UP (ref 1.8–7.4)
NEUTROPHILS NFR BLD AUTO: 54.3 % — SIGNIFICANT CHANGE UP (ref 43–77)
NRBC # BLD: 0 /100 WBCS — SIGNIFICANT CHANGE UP (ref 0–0)
NRBC # FLD: 0 K/UL — SIGNIFICANT CHANGE UP (ref 0–0)
PHOSPHATE SERPL-MCNC: 4.3 MG/DL — SIGNIFICANT CHANGE UP (ref 2.5–4.5)
PLATELET # BLD AUTO: 176 K/UL — SIGNIFICANT CHANGE UP (ref 150–400)
POTASSIUM SERPL-MCNC: 5.4 MMOL/L — HIGH (ref 3.5–5.3)
POTASSIUM SERPL-SCNC: 5.4 MMOL/L — HIGH (ref 3.5–5.3)
RBC # BLD: 4.37 M/UL — SIGNIFICANT CHANGE UP (ref 4.2–5.8)
RBC # FLD: 14.3 % — SIGNIFICANT CHANGE UP (ref 10.3–14.5)
SODIUM SERPL-SCNC: 135 MMOL/L — SIGNIFICANT CHANGE UP (ref 135–145)
WBC # BLD: 3.55 K/UL — LOW (ref 3.8–10.5)
WBC # FLD AUTO: 3.55 K/UL — LOW (ref 3.8–10.5)

## 2024-09-19 PROCEDURE — 99232 SBSQ HOSP IP/OBS MODERATE 35: CPT | Mod: FS

## 2024-09-19 PROCEDURE — 90853 GROUP PSYCHOTHERAPY: CPT

## 2024-09-19 PROCEDURE — 93010 ELECTROCARDIOGRAM REPORT: CPT

## 2024-09-19 RX ORDER — SODIUM ZIRCONIUM CYCLOSILICATE 10 G/10G
5 POWDER, FOR SUSPENSION ORAL ONCE
Refills: 0 | Status: COMPLETED | OUTPATIENT
Start: 2024-09-19 | End: 2024-09-19

## 2024-09-19 RX ADMIN — SODIUM ZIRCONIUM CYCLOSILICATE 5 GRAM(S): 10 POWDER, FOR SUSPENSION ORAL at 20:45

## 2024-09-19 RX ADMIN — Medication 3 MILLIGRAM(S): at 19:33

## 2024-09-19 RX ADMIN — Medication 17 GRAM(S): at 09:39

## 2024-09-19 RX ADMIN — Medication 5 MILLIGRAM(S): at 19:34

## 2024-09-19 RX ADMIN — Medication 0.4 MILLIGRAM(S): at 19:34

## 2024-09-19 RX ADMIN — Medication 500 MILLIGRAM(S): at 09:40

## 2024-09-19 RX ADMIN — Medication 50 MICROGRAM(S): at 06:14

## 2024-09-19 RX ADMIN — ATORVASTATIN CALCIUM 40 MILLIGRAM(S): 10 TABLET, FILM COATED ORAL at 19:34

## 2024-09-19 RX ADMIN — Medication 10 MILLIGRAM(S): at 09:40

## 2024-09-19 RX ADMIN — LABETALOL HYDROCHLORIDE 200 MILLIGRAM(S): 200 TABLET, FILM COATED ORAL at 19:33

## 2024-09-19 RX ADMIN — Medication 500 MILLIGRAM(S): at 19:34

## 2024-09-19 RX ADMIN — Medication 2 TABLET(S): at 19:34

## 2024-09-19 RX ADMIN — Medication 1 MILLIGRAM(S): at 19:34

## 2024-09-19 NOTE — BH INPATIENT PSYCHIATRY PROGRESS NOTE - CURRENT MEDICATION
MEDICATIONS  (STANDING):  amLODIPine   Tablet 10 milliGRAM(s) Oral daily  atorvastatin 40 milliGRAM(s) Oral at bedtime  benztropine 1 milliGRAM(s) Oral at bedtime  escitalopram 10 milliGRAM(s) Oral daily  haloperidol     Tablet 5 milliGRAM(s) Oral at bedtime  influenza   Vaccine 0.5 milliLiter(s) IntraMuscular once  insulin lispro (ADMELOG) corrective regimen sliding scale   SubCutaneous three times a day before meals  insulin lispro (ADMELOG) corrective regimen sliding scale   SubCutaneous at bedtime  labetalol 200 milliGRAM(s) Oral two times a day  levothyroxine 50 MICROGram(s) Oral daily  metFORMIN 500 milliGRAM(s) Oral two times a day  polyethylene glycol 3350 17 Gram(s) Oral daily  senna 2 Tablet(s) Oral at bedtime  tamsulosin 0.4 milliGRAM(s) Oral at bedtime    MEDICATIONS  (PRN):  acetaminophen     Tablet .. 650 milliGRAM(s) Oral every 6 hours PRN Mild Pain (1 - 3), Moderate Pain (4 - 6)  bisacodyl 5 milliGRAM(s) Oral every 12 hours PRN Constipation  haloperidol     Tablet 5 milliGRAM(s) Oral every 6 hours PRN agitation  haloperidol    Injectable 5 milliGRAM(s) IntraMuscular once PRN psychotic agitation  hydrOXYzine hydrochloride 50 milliGRAM(s) Oral every 6 hours PRN anxiety  LORazepam     Tablet 2 milliGRAM(s) Oral every 6 hours PRN severe anxiety  LORazepam   Injectable 2 milliGRAM(s) IntraMuscular once PRN psychotic anxiety  melatonin. 3 milliGRAM(s) Oral at bedtime PRN Insomnia   MEDICATIONS  (STANDING):  amLODIPine   Tablet 10 milliGRAM(s) Oral daily  atorvastatin 40 milliGRAM(s) Oral at bedtime  benztropine 1 milliGRAM(s) Oral at bedtime  escitalopram 10 milliGRAM(s) Oral daily  haloperidol     Tablet 5 milliGRAM(s) Oral at bedtime  influenza   Vaccine 0.5 milliLiter(s) IntraMuscular once  insulin lispro (ADMELOG) corrective regimen sliding scale   SubCutaneous three times a day before meals  insulin lispro (ADMELOG) corrective regimen sliding scale   SubCutaneous at bedtime  labetalol 200 milliGRAM(s) Oral two times a day  levothyroxine 50 MICROGram(s) Oral daily  metFORMIN 500 milliGRAM(s) Oral two times a day  polyethylene glycol 3350 17 Gram(s) Oral daily  senna 2 Tablet(s) Oral at bedtime  sodium zirconium cyclosilicate 5 Gram(s) Oral once  tamsulosin 0.4 milliGRAM(s) Oral at bedtime    MEDICATIONS  (PRN):  acetaminophen     Tablet .. 650 milliGRAM(s) Oral every 6 hours PRN Mild Pain (1 - 3), Moderate Pain (4 - 6)  bisacodyl 5 milliGRAM(s) Oral every 12 hours PRN Constipation  haloperidol     Tablet 5 milliGRAM(s) Oral every 6 hours PRN agitation  haloperidol    Injectable 5 milliGRAM(s) IntraMuscular once PRN psychotic agitation  hydrOXYzine hydrochloride 50 milliGRAM(s) Oral every 6 hours PRN anxiety  LORazepam     Tablet 2 milliGRAM(s) Oral every 6 hours PRN severe anxiety  LORazepam   Injectable 2 milliGRAM(s) IntraMuscular once PRN psychotic anxiety  melatonin. 3 milliGRAM(s) Oral at bedtime PRN Insomnia

## 2024-09-19 NOTE — CHART NOTE - NSCHARTNOTEFT_GEN_A_CORE
Called patient's HCP (sister, Brittany Arias) to discuss biopsy (performed 9/4) results from scalp lesion, consistent with ulcer and abscess (no cutaneous malignancy seen). Discussed that he received antibiotic treatment prior to transfer to [facility]. Can follow up in Dermatology clinic as needed. Pt's sister will relay to pt later today    Surgical Pathology Report - Auth (Verified)    Specimen(s) Submitted  1-H&e- occipital scalp    Final Diagnosis  Occipital scalp,  punch biopsy  - Ulcer and extensive abscess, see note    Note:  Multiple deeper sections have been examined.   PAS, AFB and Gram stains fail to reveal fungal, mycobacterial and bacterial microorganisms.  Correlation with culture result is advised.  Verified by: Navarro Harden MD    Culture - Tissue with Gram Stain (09.04.24 @ 17:02)    Gram Stain:   No polymorphonuclear cells seen per low power field  No organisms seen per oil power field    -  Clindamycin: S 0.5    -  Daptomycin: S 1    -  Erythromycin: R >4    -  Gentamicin: S <=1 Should not be used as monotherapy    -  Linezolid: S 4    -  Oxacillin: R >2    -  Penicillin: R >8    -  Rifampin: S <=1 Should not be used as monotherapy    -  Tetracycline: R >8    -  Trimethoprim/Sulfamethoxazole: S <=0.5/9.5    -  Vancomycin: S 2    Specimen Source: Tissue Other, occipital scalp    Culture Results:   Few Methicillin Resistant Staphylococcus aureus    Organism Identification: Methicillin resistant Staphylococcus aureus    Organism: Methicillin resistant Staphylococcus aureus    Method Type: HATTIE
ACP NIGHT MEDICINE COVERAGE    ISTOP Report from [facility] copied below:    Search Terms: Garrett Cristina, 1965Search Date: 09/03/2024 20:42:58 PM  The Drug Utilization Report below displays all of the controlled substance prescriptions, if any, that your patient has filled in the last twelve months. The information displayed on this report is compiled from pharmacy submissions to the Department, and accurately reflects the information as submitted by the pharmacies.    This report was requested by: Josr Hampton | Reference #: [id number]    There are no results for the search terms that you entered.  <End of copied text>    Josr Hampton PA-C  Department of [facility] Medicine - Night [facility]  In-House Pager: #[phone]

## 2024-09-19 NOTE — BH INPATIENT PSYCHIATRY PROGRESS NOTE - PRN MEDS
MEDICATIONS  (PRN):  acetaminophen     Tablet .. 650 milliGRAM(s) Oral every 6 hours PRN Mild Pain (1 - 3), Moderate Pain (4 - 6)  bisacodyl 5 milliGRAM(s) Oral every 12 hours PRN Constipation  haloperidol     Tablet 5 milliGRAM(s) Oral every 6 hours PRN agitation  haloperidol    Injectable 5 milliGRAM(s) IntraMuscular once PRN psychotic agitation  hydrOXYzine hydrochloride 50 milliGRAM(s) Oral every 6 hours PRN anxiety  LORazepam     Tablet 2 milliGRAM(s) Oral every 6 hours PRN severe anxiety  LORazepam   Injectable 2 milliGRAM(s) IntraMuscular once PRN psychotic anxiety  melatonin. 3 milliGRAM(s) Oral at bedtime PRN Insomnia

## 2024-09-19 NOTE — BH INPATIENT PSYCHIATRY PROGRESS NOTE - NSBHASSESSSUMMFT_PSY_ALL_CORE
57 yo male w PMHx of schizoaffective disorder (bipolar type with multiple [facility] admissions), HTN, HLD, hypothyroidism, CVID/hypogammaglobulinemia (monthly IVIG, last on 7/23 or 9/9), hx of frequent skin infection (MRSA, abscess/cellulitis w MRSA bacteremia s/p debridement 6/2023) presented to the ED with sisters after appearing unwell admitted for concern for suicide attempt. Psych c/s for SI.     Treatment Plan  1. admitted to unit, legal status   2. safety  - routine checks as pt denies SIIP/HIIP and is able to contract for safety  - haldol and ativan PO/IM PRN for agitation and anxiety  3. psychiatric  - c/w haldol 5mg for psychosis  - haldol dec 200mg Q3w last given 9/18, next due 10/16  - c/w lexapro 10mg  - past trials: abilify (worsening paranoia)  4. medical  -chronic hyponatremia: Na low 131, on fluid restriction < 1.5L   - hypothyroidism: levothyroxine 50mcg  - HLD: lipitor 40mg QHS  - HTN: labetalol 200mg BID, amlodipine 10mg   - DM2: metformin 500mg BID  - MRSA: s/p scalp I/D on 9/8 with recommendations from C/L   	- c/w clindamycin 450mg PO every 8 hours x 5 days (started 9/10/23)  	- wound consult ordered at [facility] 9/11, recommended bacitracin ointment QD x5 days (started 9/12/24)  - CVID: monthly IVIG (typically Gammagard S/D formula); last dose given 9/9/23 (follows with Dr. Mitchell Boxer)  5. encourage I/G/M therapy  6. dispo  - wound care: [facility]: [phone]. Address: [address] [place] [address]  - psychiatry: Dr Hollie Cook with [facility]                                                                    noia)  4. medical  -chronic hyponatremia: Na low 131, on fluid restriction < 1.5L   - hypothyroidism: levothyroxine 50mcg  - HLD: lipitor 40mg QHS  - HTN: labetalol 200mg BID, amlodipine 10mg   - DM2: metformin 500mg BID  - MRSA: s/p scalp I/D on 9/8 with recommendations from C/L   	- c/w clindamycin 450mg PO every 8 hours x 5 days (started 9/10/23)  	- wound consult ordered at [facility] 9/11, recommended bacitracin ointment QD x5 days (started 9/12/24)  - CVID: monthly IVIG (typically Gammagard S/D formula); last dose given 9/9/23 (follows with Dr. Mitchell Boxer)  5. encourage I/G/M therapy  6. dispo  - wound care: [facility]: [phone]. Address: [address] [place] [address]  - psychiatry: Dr Hollie Cook with [facility]

## 2024-09-19 NOTE — BH INPATIENT PSYCHIATRY PROGRESS NOTE - NSBHCHARTREVIEWVS_PSY_A_CORE FT
Vital Signs Last 24 Hrs  T(C): 36.8 (09-19-24 @ 08:16), Max: 36.8 (09-19-24 @ 08:16)  T(F): 98.2 (09-19-24 @ 08:16), Max: 98.2 (09-19-24 @ 08:16)  HR: --  BP: 86/59 (09-19-24 @ 08:16) (86/59 - 86/59)  BP(mean): 67 (09-19-24 @ 08:16) (67 - 67)  RR: 17 (09-19-24 @ 09:35) (17 - 17)  SpO2: --    Orthostatic VS  09-19-24 @ 09:35  Lying BP: --/-- HR: --  Sitting BP: 116/58 HR: 67  Standing BP: 90/59 HR: 76  Site: --  Mode: --  Orthostatic VS  09-18-24 @ 19:10  Lying BP: --/-- HR: --  Sitting BP: 117/64 HR: 66  Standing BP: 106/62 HR: 61  Site: --  Mode: --  Orthostatic VS  09-18-24 @ 08:20  Lying BP: --/-- HR: --  Sitting BP: 92/62 HR: 69  Standing BP: 101/64 HR: 72  Site: --  Mode: --  Orthostatic VS  09-17-24 @ 19:35  Lying BP: --/-- HR: --  Sitting BP: 129/67 HR: 57  Standing BP: 102/67 HR: 64  Site: --  Mode: --

## 2024-09-19 NOTE — BH INPATIENT PSYCHIATRY PROGRESS NOTE - NSBHMETABOLIC_PSY_ALL_CORE_FT
BMI: BMI (kg/m2): 30.4 (09-11-24 @ 14:40)  HbA1c: A1C with Estimated Average Glucose Result: 5.4 % (09-12-24 @ 10:48)    Glucose: POCT Blood Glucose.: 96 mg/dL (09-19-24 @ 11:39)    BP: 86/59 (09-19-24 @ 08:16) (86/59 - 120/64)Vital Signs Last 24 Hrs  T(C): 36.8 (09-19-24 @ 08:16), Max: 36.8 (09-19-24 @ 08:16)  T(F): 98.2 (09-19-24 @ 08:16), Max: 98.2 (09-19-24 @ 08:16)  HR: --  BP: 86/59 (09-19-24 @ 08:16) (86/59 - 86/59)  BP(mean): 67 (09-19-24 @ 08:16) (67 - 67)  RR: 17 (09-19-24 @ 09:35) (17 - 17)  SpO2: --    Orthostatic VS  09-19-24 @ 09:35  Lying BP: --/-- HR: --  Sitting BP: 116/58 HR: 67  Standing BP: 90/59 HR: 76  Site: --  Mode: --  Orthostatic VS  09-18-24 @ 19:10  Lying BP: --/-- HR: --  Sitting BP: 117/64 HR: 66  Standing BP: 106/62 HR: 61  Site: --  Mode: --  Orthostatic VS  09-18-24 @ 08:20  Lying BP: --/-- HR: --  Sitting BP: 92/62 HR: 69  Standing BP: 101/64 HR: 72  Site: --  Mode: --  Orthostatic VS  09-17-24 @ 19:35  Lying BP: --/-- HR: --  Sitting BP: 129/67 HR: 57  Standing BP: 102/67 HR: 64  Site: --  Mode: --    Lipid Panel: Date/Time: 09-12-24 @ 10:48  Cholesterol, Serum: 74  LDL Cholesterol Calculated: 33  HDL Cholesterol, Serum: 28  Total Cholesterol/HDL Ration Measurement: --  Triglycerides, Serum: 67

## 2024-09-19 NOTE — BH INPATIENT PSYCHIATRY PROGRESS NOTE - NSBHFUPINTERVALHXFT_PSY_A_CORE
Chart and history reviewed and discussed with treatment team. No events reported overnight. Labs reviewed. Seen with SW and NP student. Pt remains on fluid restriction <1.5L. Pt educated on the importance of compliance with fluid restriction, voicing understanding. Pt offers no complaints, stating that his mood is good, denying any depression. Pt denies intrusive or negative thoughts as well. Pt tentative for PHP instead of PACE for higher level of care. Tentative pre discharge meeting with treatment team, case management, pt, and pt's sisters. No behavioral issues noted. Medication compliant, tolerating well, free of EPS. Sleep and appetite maintained. Denies SI/HI/AVH. Continue to monitor for safety.     NP contacted hospitalist regarding elevated K of 5.4.     NP and SW left message for pt's sister, Brittany.  Chart and history reviewed and discussed with treatment team. No events reported overnight. Labs reviewed. Seen with SW and NP student. Pt remains on fluid restriction <1.5L. Pt educated on the importance of compliance with fluid restriction, voicing understanding. Pt offers no complaints, stating that his mood is good, denying any depression. Pt denies intrusive or negative thoughts as well. Pt tentative for PHP instead of PACE for higher level of care. Tentative pre discharge meeting with treatment team, case management, pt, and pt's sisters. No behavioral issues noted. Medication compliant, tolerating well, free of EPS. Sleep and appetite maintained. Denies SI/HI/AVH. Continue to monitor for safety.     NP contacted hospitalist regarding elevated K of 5.4. One time lokelma ordered with EKG. Reordered labs for tomorrow to assess Na and K.    NP and SW left message for pt's sister, Brittany.

## 2024-09-19 NOTE — ADVANCED PRACTICE NURSE CONSULT - ASSESSMENT
Occipital wound: Consulted for wound assessment and recommendations.  Patient denied pain, afebrile.  Patient reported that he has small moveable glands under his armpit.  Assessment: Patient with an occipital dry scab wound originated from a boil which was drained naturally. Wound with no drainage, redness.  A raised lump size less than 1 cm palpated.  Under armpit noted a small gland in each arm, no pain or redness.    
Occipital with scab dry intact, no edema, minimal redness.

## 2024-09-19 NOTE — ADVANCED PRACTICE NURSE CONSULT - RECOMMEDATIONS
No further wound care requires.  Patient was instructed not to pick on scab.
Treatment-  Apply a warm compress than cleanse the area with warm soap and water. Pat it dry. Apply bacitracin twice daily for five days. Keep the area open to air.  Provided education for patient to keep area clean, to wash his hands frequently with antibacterial soaps, which can help prevent the spread of bacteria and to not to prick or squeeze the boil.  Endorsed to primary nurse to reinforce education.

## 2024-09-20 LAB
ANION GAP SERPL CALC-SCNC: 12 MMOL/L — SIGNIFICANT CHANGE UP (ref 7–14)
BASOPHILS # BLD AUTO: 0.02 K/UL — SIGNIFICANT CHANGE UP (ref 0–0.2)
BASOPHILS NFR BLD AUTO: 0.6 % — SIGNIFICANT CHANGE UP (ref 0–2)
BUN SERPL-MCNC: 14 MG/DL — SIGNIFICANT CHANGE UP (ref 7–23)
CALCIUM SERPL-MCNC: 9.6 MG/DL — SIGNIFICANT CHANGE UP (ref 8.4–10.5)
CHLORIDE SERPL-SCNC: 94 MMOL/L — LOW (ref 98–107)
CO2 SERPL-SCNC: 25 MMOL/L — SIGNIFICANT CHANGE UP (ref 22–31)
CREAT SERPL-MCNC: 0.96 MG/DL — SIGNIFICANT CHANGE UP (ref 0.5–1.3)
EGFR: 93 ML/MIN/1.73M2 — SIGNIFICANT CHANGE UP
EOSINOPHIL # BLD AUTO: 0.18 K/UL — SIGNIFICANT CHANGE UP (ref 0–0.5)
EOSINOPHIL NFR BLD AUTO: 5.6 % — SIGNIFICANT CHANGE UP (ref 0–6)
GLUCOSE BLDC GLUCOMTR-MCNC: 102 MG/DL — HIGH (ref 70–99)
GLUCOSE BLDC GLUCOMTR-MCNC: 135 MG/DL — HIGH (ref 70–99)
GLUCOSE BLDC GLUCOMTR-MCNC: 88 MG/DL — SIGNIFICANT CHANGE UP (ref 70–99)
GLUCOSE BLDC GLUCOMTR-MCNC: 97 MG/DL — SIGNIFICANT CHANGE UP (ref 70–99)
GLUCOSE SERPL-MCNC: 143 MG/DL — HIGH (ref 70–99)
HCT VFR BLD CALC: 33.7 % — LOW (ref 39–50)
HGB BLD-MCNC: 11.6 G/DL — LOW (ref 13–17)
IANC: 1.95 K/UL — SIGNIFICANT CHANGE UP (ref 1.8–7.4)
IMM GRANULOCYTES NFR BLD AUTO: 0.9 % — SIGNIFICANT CHANGE UP (ref 0–0.9)
LYMPHOCYTES # BLD AUTO: 0.78 K/UL — LOW (ref 1–3.3)
LYMPHOCYTES # BLD AUTO: 24.1 % — SIGNIFICANT CHANGE UP (ref 13–44)
MAGNESIUM SERPL-MCNC: 1.9 MG/DL — SIGNIFICANT CHANGE UP (ref 1.6–2.6)
MCHC RBC-ENTMCNC: 28.4 PG — SIGNIFICANT CHANGE UP (ref 27–34)
MCHC RBC-ENTMCNC: 34.4 GM/DL — SIGNIFICANT CHANGE UP (ref 32–36)
MCV RBC AUTO: 82.4 FL — SIGNIFICANT CHANGE UP (ref 80–100)
MONOCYTES # BLD AUTO: 0.28 K/UL — SIGNIFICANT CHANGE UP (ref 0–0.9)
MONOCYTES NFR BLD AUTO: 8.6 % — SIGNIFICANT CHANGE UP (ref 2–14)
NEUTROPHILS # BLD AUTO: 1.95 K/UL — SIGNIFICANT CHANGE UP (ref 1.8–7.4)
NEUTROPHILS NFR BLD AUTO: 60.2 % — SIGNIFICANT CHANGE UP (ref 43–77)
NRBC # BLD: 0 /100 WBCS — SIGNIFICANT CHANGE UP (ref 0–0)
NRBC # FLD: 0 K/UL — SIGNIFICANT CHANGE UP (ref 0–0)
PHOSPHATE SERPL-MCNC: 3.7 MG/DL — SIGNIFICANT CHANGE UP (ref 2.5–4.5)
PLATELET # BLD AUTO: 165 K/UL — SIGNIFICANT CHANGE UP (ref 150–400)
POTASSIUM SERPL-MCNC: 4.4 MMOL/L — SIGNIFICANT CHANGE UP (ref 3.5–5.3)
POTASSIUM SERPL-SCNC: 4.4 MMOL/L — SIGNIFICANT CHANGE UP (ref 3.5–5.3)
RBC # BLD: 4.09 M/UL — LOW (ref 4.2–5.8)
RBC # FLD: 14.3 % — SIGNIFICANT CHANGE UP (ref 10.3–14.5)
SODIUM SERPL-SCNC: 131 MMOL/L — LOW (ref 135–145)
WBC # BLD: 3.24 K/UL — LOW (ref 3.8–10.5)
WBC # FLD AUTO: 3.24 K/UL — LOW (ref 3.8–10.5)

## 2024-09-20 PROCEDURE — 99232 SBSQ HOSP IP/OBS MODERATE 35: CPT | Mod: FS

## 2024-09-20 RX ADMIN — Medication 2 TABLET(S): at 20:12

## 2024-09-20 RX ADMIN — Medication 500 MILLIGRAM(S): at 08:23

## 2024-09-20 RX ADMIN — Medication 1 MILLIGRAM(S): at 20:13

## 2024-09-20 RX ADMIN — Medication 17 GRAM(S): at 08:23

## 2024-09-20 RX ADMIN — Medication 2 MILLIGRAM(S): at 13:36

## 2024-09-20 RX ADMIN — ATORVASTATIN CALCIUM 40 MILLIGRAM(S): 10 TABLET, FILM COATED ORAL at 20:13

## 2024-09-20 RX ADMIN — Medication 3 MILLIGRAM(S): at 20:13

## 2024-09-20 RX ADMIN — Medication 500 MILLIGRAM(S): at 20:16

## 2024-09-20 RX ADMIN — LABETALOL HYDROCHLORIDE 200 MILLIGRAM(S): 200 TABLET, FILM COATED ORAL at 20:16

## 2024-09-20 RX ADMIN — Medication 5 MILLIGRAM(S): at 20:13

## 2024-09-20 RX ADMIN — Medication 0.4 MILLIGRAM(S): at 20:13

## 2024-09-20 RX ADMIN — Medication 10 MILLIGRAM(S): at 08:23

## 2024-09-20 RX ADMIN — Medication 50 MICROGRAM(S): at 06:15

## 2024-09-20 NOTE — BH INPATIENT PSYCHIATRY PROGRESS NOTE - NSBHASSESSSUMMFT_PSY_ALL_CORE
59 yo male w PMHx of schizoaffective disorder (bipolar type with multiple [facility] admissions), HTN, HLD, hypothyroidism, CVID/hypogammaglobulinemia (monthly IVIG, last on 7/23 or 9/9), hx of frequent skin infection (MRSA, abscess/cellulitis w MRSA bacteremia s/p debridement 6/2023) presented to the ED with sisters after appearing unwell admitted for concern for suicide attempt. Psych c/s for SI.     Treatment Plan  1. admitted to unit, legal status   2. safety  - routine checks as pt denies SIIP/HIIP and is able to contract for safety  - haldol and ativan PO/IM PRN for agitation and anxiety  3. psychiatric  - c/w haldol 5mg for psychosis  - haldol dec 200mg Q3w last given 9/18, next due 10/16  - c/w lexapro 10mg  - past trials: abilify (worsening paranoia)  4. medical  -chronic hyponatremia: Na low 131, on fluid restriction < 1.5L   - hypothyroidism: levothyroxine 50mcg  - HLD: lipitor 40mg QHS  - HTN: labetalol 200mg BID, amlodipine 10mg   - DM2: metformin 500mg BID  - MRSA: s/p scalp I/D on 9/8 with recommendations from C/L   	- c/w clindamycin 450mg PO every 8 hours x 5 days (started 9/10/23)  	- wound consult ordered at [facility] 9/11, recommended bacitracin ointment QD x5 days (started 9/12/24)  - CVID: monthly IVIG (typically Gammagard S/D formula); last dose given 9/9/24, next due 10/7 (follows with Dr. Mitchell Boxer)  5. encourage I/G/M therapy  6. dispo  - wound care: [facility]: [phone]. Address: [address] [place] [address]  - psychiatry: Dr Hollie Cook with [facility]  - start PHP 10/1  - discharge 9/30 with one week supply of medications                                                                    noia)  4. medical  -chronic hyponatremia: Na low 131, on fluid restriction < 1.5L   - hypothyroidism: levothyroxine 50mcg  - HLD: lipitor 40mg QHS  - HTN: labetalol 200mg BID, amlodipine 10mg   - DM2: metformin 500mg BID  - MRSA: s/p scalp I/D on 9/8 with recommendations from C/L   	- c/w clindamycin 450mg PO every 8 hours x 5 days (started 9/10/23)  	- wound consult ordered at [facility] 9/11, recommended bacitracin ointment QD x5 days (started 9/12/24)  - CVID: monthly IVIG (typically Gammagard S/D formula); last dose given 9/9/23 (follows with Dr. Mitchell Boxer)  5. encourage I/G/M therapy  6. dispo  - wound care: [facility]: [phone]. Address: [address] [place] [address]  - psychiatry: Dr Hollie Cook with [facility]

## 2024-09-20 NOTE — BH INPATIENT PSYCHIATRY PROGRESS NOTE - NSBHMETABOLIC_PSY_ALL_CORE_FT
BMI: BMI (kg/m2): 30.4 (09-11-24 @ 14:40)  HbA1c: A1C with Estimated Average Glucose Result: 5.4 % (09-12-24 @ 10:48)    Glucose: POCT Blood Glucose.: 97 mg/dL (09-20-24 @ 07:29)    BP: 86/59 (09-19-24 @ 08:16) (86/59 - 120/64)Vital Signs Last 24 Hrs  T(C): 36.4 (09-20-24 @ 07:45), Max: 36.6 (09-19-24 @ 19:18)  T(F): 97.5 (09-20-24 @ 07:45), Max: 97.9 (09-19-24 @ 19:18)  HR: --  BP: --  BP(mean): --  RR: --  SpO2: --    Orthostatic VS  09-20-24 @ 07:45  Lying BP: --/-- HR: --  Sitting BP: 129/65 HR: 62  Standing BP: 94/55 HR: 78  Site: --  Mode: electronic  Orthostatic VS  09-19-24 @ 19:18  Lying BP: --/-- HR: --  Sitting BP: 127/71 HR: 74  Standing BP: 110/63 HR: 73  Site: --  Mode: --  Orthostatic VS  09-19-24 @ 09:35  Lying BP: --/-- HR: --  Sitting BP: 116/58 HR: 67  Standing BP: 90/59 HR: 76  Site: --  Mode: --  Orthostatic VS  09-18-24 @ 19:10  Lying BP: --/-- HR: --  Sitting BP: 117/64 HR: 66  Standing BP: 106/62 HR: 61  Site: --  Mode: --    Lipid Panel: Date/Time: 09-12-24 @ 10:48  Cholesterol, Serum: 74  LDL Cholesterol Calculated: 33  HDL Cholesterol, Serum: 28  Total Cholesterol/HDL Ration Measurement: --  Triglycerides, Serum: 67

## 2024-09-20 NOTE — BH INPATIENT PSYCHIATRY PROGRESS NOTE - NSBHCHARTREVIEWVS_PSY_A_CORE FT
Vital Signs Last 24 Hrs  T(C): 36.4 (09-20-24 @ 07:45), Max: 36.6 (09-19-24 @ 19:18)  T(F): 97.5 (09-20-24 @ 07:45), Max: 97.9 (09-19-24 @ 19:18)  HR: --  BP: --  BP(mean): --  RR: --  SpO2: --    Orthostatic VS  09-20-24 @ 07:45  Lying BP: --/-- HR: --  Sitting BP: 129/65 HR: 62  Standing BP: 94/55 HR: 78  Site: --  Mode: electronic  Orthostatic VS  09-19-24 @ 19:18  Lying BP: --/-- HR: --  Sitting BP: 127/71 HR: 74  Standing BP: 110/63 HR: 73  Site: --  Mode: --  Orthostatic VS  09-19-24 @ 09:35  Lying BP: --/-- HR: --  Sitting BP: 116/58 HR: 67  Standing BP: 90/59 HR: 76  Site: --  Mode: --  Orthostatic VS  09-18-24 @ 19:10  Lying BP: --/-- HR: --  Sitting BP: 117/64 HR: 66  Standing BP: 106/62 HR: 61  Site: --  Mode: --

## 2024-09-20 NOTE — BH INPATIENT PSYCHIATRY PROGRESS NOTE - CURRENT MEDICATION
MEDICATIONS  (STANDING):  amLODIPine   Tablet 10 milliGRAM(s) Oral daily  atorvastatin 40 milliGRAM(s) Oral at bedtime  benztropine 1 milliGRAM(s) Oral at bedtime  escitalopram 10 milliGRAM(s) Oral daily  haloperidol     Tablet 5 milliGRAM(s) Oral at bedtime  influenza   Vaccine 0.5 milliLiter(s) IntraMuscular once  insulin lispro (ADMELOG) corrective regimen sliding scale   SubCutaneous three times a day before meals  insulin lispro (ADMELOG) corrective regimen sliding scale   SubCutaneous at bedtime  labetalol 200 milliGRAM(s) Oral two times a day  levothyroxine 50 MICROGram(s) Oral daily  metFORMIN 500 milliGRAM(s) Oral two times a day  polyethylene glycol 3350 17 Gram(s) Oral daily  senna 2 Tablet(s) Oral at bedtime  tamsulosin 0.4 milliGRAM(s) Oral at bedtime    MEDICATIONS  (PRN):  acetaminophen     Tablet .. 650 milliGRAM(s) Oral every 6 hours PRN Mild Pain (1 - 3), Moderate Pain (4 - 6)  bisacodyl 5 milliGRAM(s) Oral every 12 hours PRN Constipation  haloperidol     Tablet 5 milliGRAM(s) Oral every 6 hours PRN agitation  haloperidol    Injectable 5 milliGRAM(s) IntraMuscular once PRN psychotic agitation  hydrOXYzine hydrochloride 50 milliGRAM(s) Oral every 6 hours PRN anxiety  LORazepam     Tablet 2 milliGRAM(s) Oral every 6 hours PRN severe anxiety  LORazepam   Injectable 2 milliGRAM(s) IntraMuscular once PRN psychotic anxiety  melatonin. 3 milliGRAM(s) Oral at bedtime PRN Insomnia

## 2024-09-20 NOTE — BH INPATIENT PSYCHIATRY PROGRESS NOTE - NSBHFUPINTERVALHXFT_PSY_A_CORE
Chart and history reviewed and discussed with treatment team. No events reported overnight. Labs reviewed, K WNL with Na 131. Pt seen for PHP evaluation with BRITANY Guerrero and Dr. Pearce and NP. PHP reviewed with pt voicing understanding and agreement to attend program. Current discharge plan for discharge 9/30 for PHP start date of 10/1. Discussed with PHP the importance of setting up HHA and sister's involvement post discharge. No behavioral issues noted. Medication compliant, tolerating well, free of EPS. Sleep and appetite maintained. Denies SI/HI/AVH. Continue to monitor for safety.

## 2024-09-21 LAB
GLUCOSE BLDC GLUCOMTR-MCNC: 108 MG/DL — HIGH (ref 70–99)
GLUCOSE BLDC GLUCOMTR-MCNC: 110 MG/DL — HIGH (ref 70–99)
GLUCOSE BLDC GLUCOMTR-MCNC: 91 MG/DL — SIGNIFICANT CHANGE UP (ref 70–99)
GLUCOSE BLDC GLUCOMTR-MCNC: 95 MG/DL — SIGNIFICANT CHANGE UP (ref 70–99)

## 2024-09-21 RX ADMIN — Medication 0.4 MILLIGRAM(S): at 20:17

## 2024-09-21 RX ADMIN — Medication 17 GRAM(S): at 08:09

## 2024-09-21 RX ADMIN — Medication 2 TABLET(S): at 20:16

## 2024-09-21 RX ADMIN — LABETALOL HYDROCHLORIDE 200 MILLIGRAM(S): 200 TABLET, FILM COATED ORAL at 20:17

## 2024-09-21 RX ADMIN — Medication 10 MILLIGRAM(S): at 08:09

## 2024-09-21 RX ADMIN — INFLUENZA VIRUS VACCINE 0.5 MILLILITER(S): 15; 15; 15; 15 SUSPENSION INTRAMUSCULAR at 12:42

## 2024-09-21 RX ADMIN — Medication 50 MICROGRAM(S): at 06:19

## 2024-09-21 RX ADMIN — LABETALOL HYDROCHLORIDE 200 MILLIGRAM(S): 200 TABLET, FILM COATED ORAL at 08:09

## 2024-09-21 RX ADMIN — ATORVASTATIN CALCIUM 40 MILLIGRAM(S): 10 TABLET, FILM COATED ORAL at 20:17

## 2024-09-21 RX ADMIN — Medication 5 MILLIGRAM(S): at 20:17

## 2024-09-21 RX ADMIN — Medication 1 MILLIGRAM(S): at 20:17

## 2024-09-21 RX ADMIN — Medication 10 MILLIGRAM(S): at 08:10

## 2024-09-21 RX ADMIN — Medication 500 MILLIGRAM(S): at 08:09

## 2024-09-21 RX ADMIN — Medication 500 MILLIGRAM(S): at 20:18

## 2024-09-21 RX ADMIN — Medication 3 MILLIGRAM(S): at 20:17

## 2024-09-22 LAB
GLUCOSE BLDC GLUCOMTR-MCNC: 100 MG/DL — HIGH (ref 70–99)
GLUCOSE BLDC GLUCOMTR-MCNC: 103 MG/DL — HIGH (ref 70–99)
GLUCOSE BLDC GLUCOMTR-MCNC: 104 MG/DL — HIGH (ref 70–99)
GLUCOSE BLDC GLUCOMTR-MCNC: 98 MG/DL — SIGNIFICANT CHANGE UP (ref 70–99)

## 2024-09-22 RX ADMIN — Medication 0.4 MILLIGRAM(S): at 20:26

## 2024-09-22 RX ADMIN — Medication 2 TABLET(S): at 20:28

## 2024-09-22 RX ADMIN — Medication 10 MILLIGRAM(S): at 08:01

## 2024-09-22 RX ADMIN — LABETALOL HYDROCHLORIDE 200 MILLIGRAM(S): 200 TABLET, FILM COATED ORAL at 08:01

## 2024-09-22 RX ADMIN — ATORVASTATIN CALCIUM 40 MILLIGRAM(S): 10 TABLET, FILM COATED ORAL at 20:27

## 2024-09-22 RX ADMIN — Medication 50 MICROGRAM(S): at 06:05

## 2024-09-22 RX ADMIN — Medication 500 MILLIGRAM(S): at 20:27

## 2024-09-22 RX ADMIN — LABETALOL HYDROCHLORIDE 200 MILLIGRAM(S): 200 TABLET, FILM COATED ORAL at 20:27

## 2024-09-22 RX ADMIN — Medication 17 GRAM(S): at 08:01

## 2024-09-22 RX ADMIN — Medication 500 MILLIGRAM(S): at 08:01

## 2024-09-22 RX ADMIN — Medication 3 MILLIGRAM(S): at 20:27

## 2024-09-22 RX ADMIN — Medication 1 MILLIGRAM(S): at 20:27

## 2024-09-22 RX ADMIN — Medication 10 MILLIGRAM(S): at 08:02

## 2024-09-22 RX ADMIN — Medication 5 MILLIGRAM(S): at 20:27

## 2024-09-23 LAB
GLUCOSE BLDC GLUCOMTR-MCNC: 106 MG/DL — HIGH (ref 70–99)
GLUCOSE BLDC GLUCOMTR-MCNC: 86 MG/DL — SIGNIFICANT CHANGE UP (ref 70–99)
GLUCOSE BLDC GLUCOMTR-MCNC: 98 MG/DL — SIGNIFICANT CHANGE UP (ref 70–99)

## 2024-09-23 PROCEDURE — 99232 SBSQ HOSP IP/OBS MODERATE 35: CPT | Mod: FS

## 2024-09-23 RX ADMIN — Medication 0.4 MILLIGRAM(S): at 20:16

## 2024-09-23 RX ADMIN — Medication 3 MILLIGRAM(S): at 20:16

## 2024-09-23 RX ADMIN — Medication 10 MILLIGRAM(S): at 08:22

## 2024-09-23 RX ADMIN — Medication 1 MILLIGRAM(S): at 20:15

## 2024-09-23 RX ADMIN — Medication 2 TABLET(S): at 20:15

## 2024-09-23 RX ADMIN — Medication 17 GRAM(S): at 08:22

## 2024-09-23 RX ADMIN — LABETALOL HYDROCHLORIDE 200 MILLIGRAM(S): 200 TABLET, FILM COATED ORAL at 08:22

## 2024-09-23 RX ADMIN — ATORVASTATIN CALCIUM 40 MILLIGRAM(S): 10 TABLET, FILM COATED ORAL at 20:16

## 2024-09-23 RX ADMIN — Medication 10 MILLIGRAM(S): at 08:23

## 2024-09-23 RX ADMIN — Medication 500 MILLIGRAM(S): at 20:15

## 2024-09-23 RX ADMIN — Medication 500 MILLIGRAM(S): at 08:22

## 2024-09-23 RX ADMIN — LABETALOL HYDROCHLORIDE 200 MILLIGRAM(S): 200 TABLET, FILM COATED ORAL at 20:15

## 2024-09-23 RX ADMIN — Medication 50 MICROGRAM(S): at 06:14

## 2024-09-23 NOTE — BH TREATMENT PLAN - NSTXPSYCHOINTERPR_PSY_ALL_CORE
Pt made some progress over this past week. Pt has identified his coping skills such as positive thinking, lay down, go for walk, and listen to music to manage symptom. Pt has demonstrated compliant with medication. Pt has verbalized benefit of medication compliance such as no more paranoid delusion. Pt currently denies SI, HI, AH, and VH. Pt has participated in his safety plan. Over this past week, pt showed approximately 90% of group attendance. During the group session, pt was able to tolerate group structure, actively participated with relevant feedback. Pt was visible, showed good interaction with selective peers. Pt maintained fair ADLs.
Patient will work on identifying 2-3 coping skills to assist with paranoid thinking and delusional thoughts. Progress will be evidenced by patient attending 1-2 groups Psychiatric Rehabilitation groups and communicating concerns to [facility] staff.

## 2024-09-23 NOTE — BH TREATMENT PLAN - NSTXDEPRESGOAL_PSY_ALL_CORE
Will identify 2 coping skills that assist in improving mood
Report using a coping skill to overcome sadness and worry in order to socialize with peers daily
Report using a coping skill to overcome sadness and worry in order to socialize with peers daily

## 2024-09-23 NOTE — BH SAFETY PLAN - LOCAL URGENT CARE ADDRESS
Monitor: The problem is unchanged.  Evaluation: No labs/tests required today.  Assessment/Treatment:  Managed by specialist.   [phone] [place] [address], [place] [address]

## 2024-09-23 NOTE — BH INPATIENT PSYCHIATRY PROGRESS NOTE - NSBHASSESSSUMMFT_PSY_ALL_CORE
57 yo male w PMHx of schizoaffective disorder (bipolar type with multiple [facility] admissions), HTN, HLD, hypothyroidism, CVID/hypogammaglobulinemia (monthly IVIG, last on 7/23 or 9/9), hx of frequent skin infection (MRSA, abscess/cellulitis w MRSA bacteremia s/p debridement 6/2023) presented to the ED with sisters after appearing unwell admitted for concern for suicide attempt. Psych c/s for SI.     Treatment Plan  1. admitted to unit, legal status   2. safety  - routine checks as pt denies SIIP/HIIP and is able to contract for safety  - haldol and ativan PO/IM PRN for agitation and anxiety  3. psychiatric  - c/w haldol 5mg for psychosis  - haldol dec 200mg Q3w last given 9/18, next due 10/16  - c/w lexapro 10mg  - past trials: abilify (worsening paranoia)  4. medical  -chronic hyponatremia: Na low 131, on fluid restriction < 1.5L   - hypothyroidism: levothyroxine 50mcg  - HLD: lipitor 40mg QHS  - HTN: labetalol 200mg BID, amlodipine 10mg   - DM2: metformin 500mg BID  - MRSA: s/p scalp I/D on 9/8 with recommendations from C/L   	- c/w clindamycin 450mg PO every 8 hours x 5 days (started 9/10/23)  	- wound consult ordered at [facility] 9/11, recommended bacitracin ointment QD x5 days (started 9/12/24)  - CVID: monthly IVIG (typically Gammagard S/D formula); last dose given 9/9/24, next due 10/7 (follows with Dr. Mitchell Boxer)  5. encourage I/G/M therapy  6. dispo  - outpatient treatment team meeting Thursday 9/26 at 12:30  - wound care: [facility]: [phone]. Address: [address] [place] [address]  - psychiatry: Dr Hollie Cook with [facility]  - start PHP 10/1  - discharge 9/30 with one week supply of medications                                                                    noia)  4. medical  -chronic hyponatremia: Na low 131, on fluid restriction < 1.5L   - hypothyroidism: levothyroxine 50mcg  - HLD: lipitor 40mg QHS  - HTN: labetalol 200mg BID, amlodipine 10mg   - DM2: metformin 500mg BID  - MRSA: s/p scalp I/D on 9/8 with recommendations from C/L   	- c/w clindamycin 450mg PO every 8 hours x 5 days (started 9/10/23)  	- wound consult ordered at [facility] 9/11, recommended bacitracin ointment QD x5 days (started 9/12/24)  - CVID: monthly IVIG (typically Gammagard S/D formula); last dose given 9/9/23 (follows with Dr. Mitchell Boxer)  5. encourage I/G/M therapy  6. dispo  - wound care: [facility]: [phone]. Address: [address] [place] [address]  - psychiatry: Dr Hollie Cook with [facility]

## 2024-09-23 NOTE — BH TREATMENT PLAN - NSTXPSYCHOGOAL_PSY_ALL_CORE
Will identify 2 coping skills that assist with focus on reality
Will identify 2 coping skills that help mitigate hallucinations
Will identify 2 coping skills that assist with focus on reality

## 2024-09-23 NOTE — BH TREATMENT PLAN - NSTXPSYCHOINTERMD_PSY_ALL_CORE
psychopharmacology  continue haldol  haldol dec 200mg due 9/18

## 2024-09-23 NOTE — BH INPATIENT PSYCHIATRY PROGRESS NOTE - NSBHFUPINTERVALHXFT_PSY_A_CORE
Chart and history reviewed and discussed with treatment team. No events reported overnight. Seen with SW, PAYAL, and NP student. Discussed upcoming treatment team meeting, scheduled for Thursday at 12:30. Discussed importance of higher level of care housing, with pt declining at this time. Pt adamant that rehospitalization will not happen again, along with continued overdoes. Pt made aware risk of state hospitalization with continued inpatient stay. No behavioral issues noted. Medication compliant, tolerating well, free of EPS. Sleep and appetite maintained. Denies SI/HI/AVH. Continue to monitor for safety.

## 2024-09-23 NOTE — BH TREATMENT PLAN - NSTXDCOTHRINTERSW_PSY_ALL_CORE
Sw will provide support, insight, discharge planning, psychoeducation, and will maintain contact with identified supports.
SW will provide support, insight, discharge planning, psychoeducation, and will maintain contact with identified supports.

## 2024-09-23 NOTE — BH INPATIENT PSYCHIATRY PROGRESS NOTE - CURRENT MEDICATION
MEDICATIONS  (STANDING):  amLODIPine   Tablet 10 milliGRAM(s) Oral daily  atorvastatin 40 milliGRAM(s) Oral at bedtime  benztropine 1 milliGRAM(s) Oral at bedtime  escitalopram 10 milliGRAM(s) Oral daily  haloperidol     Tablet 5 milliGRAM(s) Oral at bedtime  insulin lispro (ADMELOG) corrective regimen sliding scale   SubCutaneous at bedtime  insulin lispro (ADMELOG) corrective regimen sliding scale   SubCutaneous three times a day before meals  labetalol 200 milliGRAM(s) Oral two times a day  levothyroxine 50 MICROGram(s) Oral daily  metFORMIN 500 milliGRAM(s) Oral two times a day  polyethylene glycol 3350 17 Gram(s) Oral daily  senna 2 Tablet(s) Oral at bedtime  tamsulosin 0.4 milliGRAM(s) Oral at bedtime    MEDICATIONS  (PRN):  acetaminophen     Tablet .. 650 milliGRAM(s) Oral every 6 hours PRN Mild Pain (1 - 3), Moderate Pain (4 - 6)  bisacodyl 5 milliGRAM(s) Oral every 12 hours PRN Constipation  haloperidol     Tablet 5 milliGRAM(s) Oral every 6 hours PRN agitation  haloperidol    Injectable 5 milliGRAM(s) IntraMuscular once PRN psychotic agitation  hydrOXYzine hydrochloride 50 milliGRAM(s) Oral every 6 hours PRN anxiety  LORazepam     Tablet 2 milliGRAM(s) Oral every 6 hours PRN severe anxiety  LORazepam   Injectable 2 milliGRAM(s) IntraMuscular once PRN psychotic anxiety  melatonin. 3 milliGRAM(s) Oral at bedtime PRN Insomnia   MEDICATIONS  (STANDING):  amLODIPine   Tablet 10 milliGRAM(s) Oral daily  atorvastatin 40 milliGRAM(s) Oral at bedtime  benztropine 1 milliGRAM(s) Oral at bedtime  escitalopram 10 milliGRAM(s) Oral daily  labetalol 200 milliGRAM(s) Oral two times a day  levothyroxine 50 MICROGram(s) Oral daily  metFORMIN 500 milliGRAM(s) Oral two times a day  polyethylene glycol 3350 17 Gram(s) Oral daily  senna 2 Tablet(s) Oral at bedtime  tamsulosin 0.4 milliGRAM(s) Oral at bedtime    MEDICATIONS  (PRN):  acetaminophen     Tablet .. 650 milliGRAM(s) Oral every 6 hours PRN Mild Pain (1 - 3), Moderate Pain (4 - 6)  bisacodyl 5 milliGRAM(s) Oral every 12 hours PRN Constipation  haloperidol     Tablet 5 milliGRAM(s) Oral every 6 hours PRN agitation  haloperidol    Injectable 5 milliGRAM(s) IntraMuscular once PRN psychotic agitation  hydrOXYzine hydrochloride 50 milliGRAM(s) Oral every 6 hours PRN anxiety  LORazepam     Tablet 2 milliGRAM(s) Oral every 6 hours PRN severe anxiety  LORazepam   Injectable 2 milliGRAM(s) IntraMuscular once PRN psychotic anxiety  melatonin. 3 milliGRAM(s) Oral at bedtime PRN Insomnia

## 2024-09-23 NOTE — BH TREATMENT PLAN - NSTXDEPRESINTERMD_PSY_ALL_CORE
psychopharmacology  continue lexapro

## 2024-09-23 NOTE — BH INPATIENT PSYCHIATRY PROGRESS NOTE - NSBHCHARTREVIEWVS_PSY_A_CORE FT
Vital Signs Last 24 Hrs  T(C): 36.6 (09-23-24 @ 06:23), Max: 36.6 (09-23-24 @ 06:23)  T(F): 97.9 (09-23-24 @ 06:23), Max: 97.9 (09-23-24 @ 06:23)  HR: --  BP: --  BP(mean): --  RR: 17 (09-23-24 @ 06:23) (17 - 17)  SpO2: --    Orthostatic VS  09-23-24 @ 06:23  Lying BP: --/-- HR: --  Sitting BP: 98/63 HR: 59  Standing BP: 106/71 HR: 65  Site: --  Mode: --  Orthostatic VS  09-22-24 @ 19:31  Lying BP: --/-- HR: --  Sitting BP: 128/70 HR: 59  Standing BP: 100/58 HR: 67  Site: --  Mode: --  Orthostatic VS  09-22-24 @ 07:59  Lying BP: --/-- HR: --  Sitting BP: 93/62 HR: 65  Standing BP: 103/68 HR: 72  Site: --  Mode: --  Orthostatic VS  09-21-24 @ 19:11  Lying BP: --/-- HR: --  Sitting BP: 129/74 HR: 62  Standing BP: 96/51 HR: 73  Site: --  Mode: --   Vital Signs Last 24 Hrs  T(C): 36.7 (09-24-24 @ 08:16), Max: 36.7 (09-24-24 @ 08:16)  T(F): 98 (09-24-24 @ 08:16), Max: 98 (09-24-24 @ 08:16)  HR: --  BP: --  BP(mean): --  RR: 18 (09-23-24 @ 21:15) (18 - 18)  SpO2: --    Orthostatic VS  09-24-24 @ 08:16  Lying BP: --/-- HR: --  Sitting BP: 115/74 HR: 66  Standing BP: 98/61 HR: 70  Site: --  Mode: --  Orthostatic VS  09-23-24 @ 19:15  Lying BP: --/-- HR: --  Sitting BP: 105/65 HR: 60  Standing BP: 97/60 HR: 67  Site: --  Mode: --  Orthostatic VS  09-23-24 @ 06:23  Lying BP: --/-- HR: --  Sitting BP: 98/63 HR: 59  Standing BP: 106/71 HR: 65  Site: --  Mode: --  Orthostatic VS  09-22-24 @ 19:31  Lying BP: --/-- HR: --  Sitting BP: 128/70 HR: 59  Standing BP: 100/58 HR: 67  Site: --  Mode: --

## 2024-09-23 NOTE — BH INPATIENT PSYCHIATRY PROGRESS NOTE - NSBHMETABOLIC_PSY_ALL_CORE_FT
BMI: BMI (kg/m2): 30.4 (09-11-24 @ 14:40)  HbA1c: A1C with Estimated Average Glucose Result: 5.4 % (09-12-24 @ 10:48)    Glucose: POCT Blood Glucose.: 86 mg/dL (09-23-24 @ 11:27)    BP: --Vital Signs Last 24 Hrs  T(C): 36.6 (09-23-24 @ 06:23), Max: 36.6 (09-23-24 @ 06:23)  T(F): 97.9 (09-23-24 @ 06:23), Max: 97.9 (09-23-24 @ 06:23)  HR: --  BP: --  BP(mean): --  RR: 17 (09-23-24 @ 06:23) (17 - 17)  SpO2: --    Orthostatic VS  09-23-24 @ 06:23  Lying BP: --/-- HR: --  Sitting BP: 98/63 HR: 59  Standing BP: 106/71 HR: 65  Site: --  Mode: --  Orthostatic VS  09-22-24 @ 19:31  Lying BP: --/-- HR: --  Sitting BP: 128/70 HR: 59  Standing BP: 100/58 HR: 67  Site: --  Mode: --  Orthostatic VS  09-22-24 @ 07:59  Lying BP: --/-- HR: --  Sitting BP: 93/62 HR: 65  Standing BP: 103/68 HR: 72  Site: --  Mode: --  Orthostatic VS  09-21-24 @ 19:11  Lying BP: --/-- HR: --  Sitting BP: 129/74 HR: 62  Standing BP: 96/51 HR: 73  Site: --  Mode: --    Lipid Panel: Date/Time: 09-12-24 @ 10:48  Cholesterol, Serum: 74  LDL Cholesterol Calculated: 33  HDL Cholesterol, Serum: 28  Total Cholesterol/HDL Ration Measurement: --  Triglycerides, Serum: 67   BMI: BMI (kg/m2): 30.4 (09-11-24 @ 14:40)  HbA1c: A1C with Estimated Average Glucose Result: 5.4 % (09-12-24 @ 10:48)    Glucose: POCT Blood Glucose.: 111 mg/dL (09-24-24 @ 11:27)    BP: --Vital Signs Last 24 Hrs  T(C): 36.7 (09-24-24 @ 08:16), Max: 36.7 (09-24-24 @ 08:16)  T(F): 98 (09-24-24 @ 08:16), Max: 98 (09-24-24 @ 08:16)  HR: --  BP: --  BP(mean): --  RR: 18 (09-23-24 @ 21:15) (18 - 18)  SpO2: --    Orthostatic VS  09-24-24 @ 08:16  Lying BP: --/-- HR: --  Sitting BP: 115/74 HR: 66  Standing BP: 98/61 HR: 70  Site: --  Mode: --  Orthostatic VS  09-23-24 @ 19:15  Lying BP: --/-- HR: --  Sitting BP: 105/65 HR: 60  Standing BP: 97/60 HR: 67  Site: --  Mode: --  Orthostatic VS  09-23-24 @ 06:23  Lying BP: --/-- HR: --  Sitting BP: 98/63 HR: 59  Standing BP: 106/71 HR: 65  Site: --  Mode: --  Orthostatic VS  09-22-24 @ 19:31  Lying BP: --/-- HR: --  Sitting BP: 128/70 HR: 59  Standing BP: 100/58 HR: 67  Site: --  Mode: --    Lipid Panel: Date/Time: 09-12-24 @ 10:48  Cholesterol, Serum: 74  LDL Cholesterol Calculated: 33  HDL Cholesterol, Serum: 28  Total Cholesterol/HDL Ration Measurement: --  Triglycerides, Serum: 67

## 2024-09-24 LAB
ANION GAP SERPL CALC-SCNC: 13 MMOL/L — SIGNIFICANT CHANGE UP (ref 7–14)
BUN SERPL-MCNC: 13 MG/DL — SIGNIFICANT CHANGE UP (ref 7–23)
CALCIUM SERPL-MCNC: 9.8 MG/DL — SIGNIFICANT CHANGE UP (ref 8.4–10.5)
CHLORIDE SERPL-SCNC: 96 MMOL/L — LOW (ref 98–107)
CO2 SERPL-SCNC: 21 MMOL/L — LOW (ref 22–31)
CREAT SERPL-MCNC: 1 MG/DL — SIGNIFICANT CHANGE UP (ref 0.5–1.3)
EGFR: 88 ML/MIN/1.73M2 — SIGNIFICANT CHANGE UP
GLUCOSE BLDC GLUCOMTR-MCNC: 109 MG/DL — HIGH (ref 70–99)
GLUCOSE BLDC GLUCOMTR-MCNC: 111 MG/DL — HIGH (ref 70–99)
GLUCOSE BLDC GLUCOMTR-MCNC: 87 MG/DL — SIGNIFICANT CHANGE UP (ref 70–99)
GLUCOSE SERPL-MCNC: 116 MG/DL — HIGH (ref 70–99)
MAGNESIUM SERPL-MCNC: 2 MG/DL — SIGNIFICANT CHANGE UP (ref 1.6–2.6)
PHOSPHATE SERPL-MCNC: 3.7 MG/DL — SIGNIFICANT CHANGE UP (ref 2.5–4.5)
POTASSIUM SERPL-MCNC: 4.8 MMOL/L — SIGNIFICANT CHANGE UP (ref 3.5–5.3)
POTASSIUM SERPL-SCNC: 4.8 MMOL/L — SIGNIFICANT CHANGE UP (ref 3.5–5.3)
SODIUM SERPL-SCNC: 130 MMOL/L — LOW (ref 135–145)

## 2024-09-24 PROCEDURE — 90853 GROUP PSYCHOTHERAPY: CPT

## 2024-09-24 PROCEDURE — 99232 SBSQ HOSP IP/OBS MODERATE 35: CPT | Mod: FS

## 2024-09-24 RX ADMIN — ATORVASTATIN CALCIUM 40 MILLIGRAM(S): 10 TABLET, FILM COATED ORAL at 20:08

## 2024-09-24 RX ADMIN — Medication 2 MILLIGRAM(S): at 17:02

## 2024-09-24 RX ADMIN — LABETALOL HYDROCHLORIDE 200 MILLIGRAM(S): 200 TABLET, FILM COATED ORAL at 20:08

## 2024-09-24 RX ADMIN — LABETALOL HYDROCHLORIDE 200 MILLIGRAM(S): 200 TABLET, FILM COATED ORAL at 08:04

## 2024-09-24 RX ADMIN — Medication 17 GRAM(S): at 08:04

## 2024-09-24 RX ADMIN — Medication 50 MICROGRAM(S): at 05:24

## 2024-09-24 RX ADMIN — Medication 500 MILLIGRAM(S): at 20:08

## 2024-09-24 RX ADMIN — Medication 3 MILLIGRAM(S): at 20:08

## 2024-09-24 RX ADMIN — Medication 10 MILLIGRAM(S): at 08:04

## 2024-09-24 RX ADMIN — Medication 1 MILLIGRAM(S): at 20:08

## 2024-09-24 RX ADMIN — Medication 2 TABLET(S): at 20:09

## 2024-09-24 RX ADMIN — Medication 10 MILLIGRAM(S): at 08:05

## 2024-09-24 RX ADMIN — Medication 0.4 MILLIGRAM(S): at 20:08

## 2024-09-24 RX ADMIN — Medication 500 MILLIGRAM(S): at 08:05

## 2024-09-24 NOTE — BH INPATIENT PSYCHIATRY PROGRESS NOTE - NSBHMETABOLIC_PSY_ALL_CORE_FT
BMI: BMI (kg/m2): 30.4 (09-11-24 @ 14:40)  HbA1c: A1C with Estimated Average Glucose Result: 5.4 % (09-12-24 @ 10:48)    Glucose: POCT Blood Glucose.: 111 mg/dL (09-24-24 @ 11:27)    BP: --Vital Signs Last 24 Hrs  T(C): 36.7 (09-24-24 @ 08:16), Max: 36.7 (09-24-24 @ 08:16)  T(F): 98 (09-24-24 @ 08:16), Max: 98 (09-24-24 @ 08:16)  HR: --  BP: --  BP(mean): --  RR: 18 (09-23-24 @ 21:15) (18 - 18)  SpO2: --    Orthostatic VS  09-24-24 @ 08:16  Lying BP: --/-- HR: --  Sitting BP: 115/74 HR: 66  Standing BP: 98/61 HR: 70  Site: --  Mode: --  Orthostatic VS  09-23-24 @ 19:15  Lying BP: --/-- HR: --  Sitting BP: 105/65 HR: 60  Standing BP: 97/60 HR: 67  Site: --  Mode: --  Orthostatic VS  09-23-24 @ 06:23  Lying BP: --/-- HR: --  Sitting BP: 98/63 HR: 59  Standing BP: 106/71 HR: 65  Site: --  Mode: --  Orthostatic VS  09-22-24 @ 19:31  Lying BP: --/-- HR: --  Sitting BP: 128/70 HR: 59  Standing BP: 100/58 HR: 67  Site: --  Mode: --    Lipid Panel: Date/Time: 09-12-24 @ 10:48  Cholesterol, Serum: 74  LDL Cholesterol Calculated: 33  HDL Cholesterol, Serum: 28  Total Cholesterol/HDL Ration Measurement: --  Triglycerides, Serum: 67   BMI: BMI (kg/m2): 30.4 (09-11-24 @ 14:40)  HbA1c: A1C with Estimated Average Glucose Result: 5.4 % (09-12-24 @ 10:48)    Glucose: POCT Blood Glucose.: 113 mg/dL (09-25-24 @ 08:08)    BP: --Vital Signs Last 24 Hrs  T(C): 36.4 (09-24-24 @ 19:41), Max: 36.4 (09-24-24 @ 19:41)  T(F): 97.6 (09-24-24 @ 19:41), Max: 97.6 (09-24-24 @ 19:41)  HR: --  BP: --  BP(mean): --  RR: --  SpO2: --    Orthostatic VS  09-24-24 @ 19:41  Lying BP: --/-- HR: --  Sitting BP: 126/67 HR: 61  Standing BP: 109/63 HR: 76  Site: --  Mode: --  Orthostatic VS  09-24-24 @ 08:16  Lying BP: --/-- HR: --  Sitting BP: 115/74 HR: 66  Standing BP: 98/61 HR: 70  Site: --  Mode: --  Orthostatic VS  09-23-24 @ 19:15  Lying BP: --/-- HR: --  Sitting BP: 105/65 HR: 60  Standing BP: 97/60 HR: 67  Site: --  Mode: --    Lipid Panel: Date/Time: 09-12-24 @ 10:48  Cholesterol, Serum: 74  LDL Cholesterol Calculated: 33  HDL Cholesterol, Serum: 28  Total Cholesterol/HDL Ration Measurement: --  Triglycerides, Serum: 67

## 2024-09-24 NOTE — BH INPATIENT PSYCHIATRY PROGRESS NOTE - NSBHCHARTREVIEWVS_PSY_A_CORE FT
Vital Signs Last 24 Hrs  T(C): 36.7 (09-24-24 @ 08:16), Max: 36.7 (09-24-24 @ 08:16)  T(F): 98 (09-24-24 @ 08:16), Max: 98 (09-24-24 @ 08:16)  HR: --  BP: --  BP(mean): --  RR: 18 (09-23-24 @ 21:15) (18 - 18)  SpO2: --    Orthostatic VS  09-24-24 @ 08:16  Lying BP: --/-- HR: --  Sitting BP: 115/74 HR: 66  Standing BP: 98/61 HR: 70  Site: --  Mode: --  Orthostatic VS  09-23-24 @ 19:15  Lying BP: --/-- HR: --  Sitting BP: 105/65 HR: 60  Standing BP: 97/60 HR: 67  Site: --  Mode: --  Orthostatic VS  09-23-24 @ 06:23  Lying BP: --/-- HR: --  Sitting BP: 98/63 HR: 59  Standing BP: 106/71 HR: 65  Site: --  Mode: --  Orthostatic VS  09-22-24 @ 19:31  Lying BP: --/-- HR: --  Sitting BP: 128/70 HR: 59  Standing BP: 100/58 HR: 67  Site: --  Mode: --   Vital Signs Last 24 Hrs  T(C): 36.4 (09-24-24 @ 19:41), Max: 36.4 (09-24-24 @ 19:41)  T(F): 97.6 (09-24-24 @ 19:41), Max: 97.6 (09-24-24 @ 19:41)  HR: --  BP: --  BP(mean): --  RR: --  SpO2: --    Orthostatic VS  09-24-24 @ 19:41  Lying BP: --/-- HR: --  Sitting BP: 126/67 HR: 61  Standing BP: 109/63 HR: 76  Site: --  Mode: --  Orthostatic VS  09-24-24 @ 08:16  Lying BP: --/-- HR: --  Sitting BP: 115/74 HR: 66  Standing BP: 98/61 HR: 70  Site: --  Mode: --  Orthostatic VS  09-23-24 @ 19:15  Lying BP: --/-- HR: --  Sitting BP: 105/65 HR: 60  Standing BP: 97/60 HR: 67  Site: --  Mode: --

## 2024-09-24 NOTE — BH INPATIENT PSYCHIATRY PROGRESS NOTE - CURRENT MEDICATION
MEDICATIONS  (STANDING):  amLODIPine   Tablet 10 milliGRAM(s) Oral daily  atorvastatin 40 milliGRAM(s) Oral at bedtime  benztropine 1 milliGRAM(s) Oral at bedtime  escitalopram 10 milliGRAM(s) Oral daily  labetalol 200 milliGRAM(s) Oral two times a day  levothyroxine 50 MICROGram(s) Oral daily  metFORMIN 500 milliGRAM(s) Oral two times a day  polyethylene glycol 3350 17 Gram(s) Oral daily  senna 2 Tablet(s) Oral at bedtime  tamsulosin 0.4 milliGRAM(s) Oral at bedtime    MEDICATIONS  (PRN):  acetaminophen     Tablet .. 650 milliGRAM(s) Oral every 6 hours PRN Mild Pain (1 - 3), Moderate Pain (4 - 6)  bisacodyl 5 milliGRAM(s) Oral every 12 hours PRN Constipation  haloperidol     Tablet 5 milliGRAM(s) Oral every 6 hours PRN agitation  haloperidol    Injectable 5 milliGRAM(s) IntraMuscular once PRN psychotic agitation  hydrOXYzine hydrochloride 50 milliGRAM(s) Oral every 6 hours PRN anxiety  LORazepam     Tablet 2 milliGRAM(s) Oral every 6 hours PRN severe anxiety  LORazepam   Injectable 2 milliGRAM(s) IntraMuscular once PRN psychotic anxiety  melatonin. 3 milliGRAM(s) Oral at bedtime PRN Insomnia

## 2024-09-24 NOTE — BH INPATIENT PSYCHIATRY PROGRESS NOTE - NSBHFUPINTERVALHXFT_PSY_A_CORE
Patient is followed up for psychosis/mood. Chart, medications and labs reviewed. Patient is discussed during morning brief, no overnight events, has been in good behavioral control.   Patient was seen and is evaluated on the unit. He reports having good sleep overnight, appetite remains well. He states that he is doing "well", denies feeling anxious or having paranoid thoughts. Remains motivated in attending PHP following hospitalization. He has been compliant with standing medications, denies SE. He denies SIIP. Denies any AVH. No acute medical concerns, VSS. Patient is followed up for psychosis/mood. Chart, medications and labs reviewed. Patient is discussed during morning brief, no overnight events, has been in good behavioral control.   Patient was seen and is evaluated on the unit. He reports having good sleep overnight, appetite remains well. He states that he is doing "well", denies feeling anxious or having paranoid thoughts. Remains motivated in attending PHP following hospitalization. He has been compliant with standing medications, denies SE. He denies SIIP. Denies any AVH. Was able to have labs drawn today, remains hyponatremic (Na - 130). He remains on fluid restriction <1500mL. No acute medical concerns, VSS. Patient is followed up for psychosis/mood. Chart, medications and labs reviewed. Patient is discussed during morning brief, no overnight events, has been in good behavioral control.   Patient was seen and is evaluated on the unit. He reports having good sleep overnight, appetite remains well. He states that he is doing "well", denies feeling anxious or having paranoid thoughts. Remains motivated in attending PHP following hospitalization. He has been compliant with standing medications, denies SE. He denies SIIP. Denies any AVH. Was able to have labs drawn today, remains hyponatremic (Na - 130). He remains on fluid restriction, 1500mL. No acute medical concerns, VSS.

## 2024-09-24 NOTE — BH INPATIENT PSYCHIATRY PROGRESS NOTE - NSBHASSESSSUMMFT_PSY_ALL_CORE
59 yo male w PMHx of schizoaffective disorder (bipolar type with multiple [facility] admissions), HTN, HLD, hypothyroidism, CVID/hypogammaglobulinemia (monthly IVIG, last on 7/23 or 9/9), hx of frequent skin infection (MRSA, abscess/cellulitis w MRSA bacteremia s/p debridement 6/2023) presented to the ED with sisters after appearing unwell admitted for concern for suicide attempt. Psych c/s for SI.     Treatment Plan  1. admitted to unit, legal status   2. safety  - routine checks as pt denies SIIP/HIIP and is able to contract for safety  - haldol and ativan PO/IM PRN for agitation and anxiety  3. psychiatric  - c/w haldol 5mg for psychosis  - haldol dec 200mg Q3w last given 9/18, next due 10/16  - c/w lexapro 10mg  - past trials: abilify (worsening paranoia)  4. medical  -chronic hyponatremia: Na low 131, on fluid restriction < 1.5L   - hypothyroidism: levothyroxine 50mcg  - HLD: lipitor 40mg QHS  - HTN: labetalol 200mg BID, amlodipine 10mg   - DM2: metformin 500mg BID  - MRSA: s/p scalp I/D on 9/8 with recommendations from [facility]   	- c/w clindamycin 450mg PO every 8 hours x 5 days (started 9/10/23)  	- wound consult ordered at [facility] 9/11, recommended bacitracin ointment QD x5 days (started 9/12/24)  - CVID: monthly IVIG (typically Gammagard S/D formula); last dose given 9/9/24, next due 10/7 (follows with Dr. Mitchell Boxer)  5. encourage I/G/M therapy  6. dispo  - outpatient treatment team meeting Thursday 9/26 at 12:30  - wound care: [facility]: [phone]. Address: [address] [place] [address]  - psychiatry: Dr Hollie Cook with [facility]  - start PHP 10/1  - discharge 9/30 with one week supply of medications                                                                    noia)  4. medical  -chronic hyponatremia: Na low 131, on fluid restriction < 1.5L   - hypothyroidism: levothyroxine 50mcg  - HLD: lipitor 40mg QHS  - HTN: labetalol 200mg BID, amlodipine 10mg   - DM2: metformin 500mg BID  - MRSA: s/p scalp I/D on 9/8 with recommendations from [facility]   	- c/w clindamycin 450mg PO every 8 hours x 5 days (started 9/10/23)  	- wound consult ordered at [facility] 9/11, recommended bacitracin ointment QD x5 days (started 9/12/24)  - CVID: monthly IVIG (typically Gammagard S/D formula); last dose given 9/9/23 (follows with Dr. Mitchell Boxer)  5. encourage I/G/M therapy  6. dispo  - wound care: [facility]: [phone]. Address: [address] [place] [address]  - psychiatry: Dr Hollie Cook with [facility]

## 2024-09-24 NOTE — BH PSYCHOLOGY - CLINICIAN PSYCHOTHERAPY NOTE - TOKEN PULL-DIAGNOSIS
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

## 2024-09-25 LAB
GLUCOSE BLDC GLUCOMTR-MCNC: 113 MG/DL — HIGH (ref 70–99)
GLUCOSE BLDC GLUCOMTR-MCNC: 120 MG/DL — HIGH (ref 70–99)

## 2024-09-25 PROCEDURE — 99232 SBSQ HOSP IP/OBS MODERATE 35: CPT | Mod: FS

## 2024-09-25 RX ADMIN — Medication 3 MILLIGRAM(S): at 20:31

## 2024-09-25 RX ADMIN — Medication 10 MILLIGRAM(S): at 08:19

## 2024-09-25 RX ADMIN — Medication 2 TABLET(S): at 20:31

## 2024-09-25 RX ADMIN — Medication 10 MILLIGRAM(S): at 08:20

## 2024-09-25 RX ADMIN — Medication 500 MILLIGRAM(S): at 08:19

## 2024-09-25 RX ADMIN — ATORVASTATIN CALCIUM 40 MILLIGRAM(S): 10 TABLET, FILM COATED ORAL at 20:31

## 2024-09-25 RX ADMIN — LABETALOL HYDROCHLORIDE 200 MILLIGRAM(S): 200 TABLET, FILM COATED ORAL at 20:31

## 2024-09-25 RX ADMIN — Medication 0.4 MILLIGRAM(S): at 20:32

## 2024-09-25 RX ADMIN — Medication 1 MILLIGRAM(S): at 20:31

## 2024-09-25 RX ADMIN — Medication 500 MILLIGRAM(S): at 20:31

## 2024-09-25 RX ADMIN — LABETALOL HYDROCHLORIDE 200 MILLIGRAM(S): 200 TABLET, FILM COATED ORAL at 08:19

## 2024-09-25 RX ADMIN — Medication 50 MICROGRAM(S): at 06:23

## 2024-09-25 NOTE — BH INPATIENT PSYCHIATRY DISCHARGE NOTE - NSBHDCRISKMITIGATE_PSY_ALL_CORE
Safety planning/Referral to PHP/Referral to case management/Family/Other social support involvement/Long acting injectable medication/Medications targeting suicidality/non-suicidal self injurious behavior/Assisted outpatient treatment

## 2024-09-25 NOTE — BH INPATIENT PSYCHIATRY DISCHARGE NOTE - NSBHFUPINTERVALHXFT_PSY_A_CORE
... Patient is followed up for psychosis/mood. Chart, medications and labs reviewed. Patient is discussed during morning brief, no overnight events, has been in good behavioral control.   Patient was seen and is evaluated on the unit. He reports having good sleep and appetite over the weekend. He states that he is doing "good" and is looking forward to discharge today w/ plan to attend PHP tomorrow. Reports that he will utilize coping skills and speak to his family if developing paranoia thoughts. He has been compliant with standing medications, denies SE. He denies SIIP. Denies any AVH or paranoia. No acute medical concerns, VSS. Pt being discharged today to home, given 1 week supply of prescriptions.

## 2024-09-25 NOTE — BH INPATIENT PSYCHIATRY DISCHARGE NOTE - NSDCPROCEDURESFT_PSY_ALL_CORE
pt was admitted to [facility] for workup s/p OD pt was admitted to [facility] for workup s/p OD prior to [facility] transfer  pt seen by wound care RN for healing wound to back of head, treated with bacitracin x5 days   pt placed on fluid restrictions of 1500ml for hyponatremia (Ku=119)

## 2024-09-25 NOTE — BH INPATIENT PSYCHIATRY DISCHARGE NOTE - NSDCTESTSFT_PSY_ALL_CORE
continue with outpatient doctors to follow up with chronic medical issues of CVID and skin wounds Dr. Mitchell Boxer, allergist and immunologist  [address] [place] [address]  [phone]  10/8 at 12:15

## 2024-09-25 NOTE — BH INPATIENT PSYCHIATRY DISCHARGE NOTE - REASON FOR ADMISSION
Please help her establish with Dr. Helms hepatology asap   you were admitted to [facility] after an OD of #20 tabs of labetalol

## 2024-09-25 NOTE — BH INPATIENT PSYCHIATRY PROGRESS NOTE - NSBHFUPINTERVALHXFT_PSY_A_CORE
Chart and history reviewed and discussed with treatment team. No events reported overnight. Seen with SW and SWI. Pt continues to report improvement to mood, stating that intrusive thoughts and paranoia are well controlled. Pt made aware of upcoming treatment team meeting with his housing, with pt voicing agreement and understanding. Pt reports some anxiety about feeling "stuck" in the hospital, though reminded of upcoming discharge date of Monday 9/30.    No behavioral issues noted. Medication compliant, tolerating well, free of EPS. Sleep and appetite maintained. Denies SI/HI/AVH. Continue to monitor for safety.  Chart and history reviewed and discussed with treatment team. No events reported overnight. Seen with SW and SWI. Pt continues to report improvement to mood, stating that intrusive thoughts and paranoia are well controlled. Pt made aware of upcoming treatment team meeting with his housing, with pt voicing agreement and understanding. Pt reports some anxiety about feeling "stuck" in the hospital, though reminded of upcoming discharge date of Monday 9/30.    No behavioral issues noted. Medication compliant, tolerating well, free of EPS. Remains on fluid restriction <1.5L. Sleep and appetite maintained. Denies SI/HI/AVH. Continue to monitor for safety.
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
## 2024-09-25 NOTE — BH INPATIENT PSYCHIATRY DISCHARGE NOTE - NSBHMETABOLIC_PSY_ALL_CORE_FT
BMI: BMI (kg/m2): 30.4 (09-11-24 @ 14:40)  HbA1c: A1C with Estimated Average Glucose Result: 5.4 % (09-12-24 @ 10:48)    Glucose: POCT Blood Glucose.: 113 mg/dL (09-25-24 @ 08:08)    BP: --Vital Signs Last 24 Hrs  T(C): 36.4 (09-24-24 @ 19:41), Max: 36.4 (09-24-24 @ 19:41)  T(F): 97.6 (09-24-24 @ 19:41), Max: 97.6 (09-24-24 @ 19:41)  HR: --  BP: --  BP(mean): --  RR: --  SpO2: --    Orthostatic VS  09-24-24 @ 19:41  Lying BP: --/-- HR: --  Sitting BP: 126/67 HR: 61  Standing BP: 109/63 HR: 76  Site: --  Mode: --  Orthostatic VS  09-24-24 @ 08:16  Lying BP: --/-- HR: --  Sitting BP: 115/74 HR: 66  Standing BP: 98/61 HR: 70  Site: --  Mode: --  Orthostatic VS  09-23-24 @ 19:15  Lying BP: --/-- HR: --  Sitting BP: 105/65 HR: 60  Standing BP: 97/60 HR: 67  Site: --  Mode: --    Lipid Panel: Date/Time: 09-12-24 @ 10:48  Cholesterol, Serum: 74  LDL Cholesterol Calculated: 33  HDL Cholesterol, Serum: 28  Total Cholesterol/HDL Ration Measurement: --  Triglycerides, Serum: 67

## 2024-09-25 NOTE — BH INPATIENT PSYCHIATRY PROGRESS NOTE - NSBHCHARTREVIEWVS_PSY_A_CORE FT
Vital Signs Last 24 Hrs  T(C): 36.4 (09-24-24 @ 19:41), Max: 36.4 (09-24-24 @ 19:41)  T(F): 97.6 (09-24-24 @ 19:41), Max: 97.6 (09-24-24 @ 19:41)  HR: --  BP: --  BP(mean): --  RR: --  SpO2: --    Orthostatic VS  09-24-24 @ 19:41  Lying BP: --/-- HR: --  Sitting BP: 126/67 HR: 61  Standing BP: 109/63 HR: 76  Site: --  Mode: --  Orthostatic VS  09-24-24 @ 08:16  Lying BP: --/-- HR: --  Sitting BP: 115/74 HR: 66  Standing BP: 98/61 HR: 70  Site: --  Mode: --  Orthostatic VS  09-23-24 @ 19:15  Lying BP: --/-- HR: --  Sitting BP: 105/65 HR: 60  Standing BP: 97/60 HR: 67  Site: --  Mode: --   Vital Signs Last 24 Hrs  T(C): 36.6 (09-26-24 @ 09:00), Max: 36.6 (09-26-24 @ 09:00)  T(F): 97.8 (09-26-24 @ 09:00), Max: 97.8 (09-26-24 @ 09:00)  HR: --  BP: --  BP(mean): --  RR: 18 (09-26-24 @ 09:00) (18 - 18)  SpO2: --    Orthostatic VS  09-26-24 @ 09:00  Lying BP: --/-- HR: --  Sitting BP: 130/71 HR: 60  Standing BP: 121/62 HR: 72  Site: --  Mode: --  Orthostatic VS  09-25-24 @ 19:09  Lying BP: --/-- HR: --  Sitting BP: 149/84 HR: 64  Standing BP: 113/63 HR: 68  Site: --  Mode: --  Orthostatic VS  09-24-24 @ 19:41  Lying BP: --/-- HR: --  Sitting BP: 126/67 HR: 61  Standing BP: 109/63 HR: 76  Site: --  Mode: --

## 2024-09-25 NOTE — BH INPATIENT PSYCHIATRY DISCHARGE NOTE - HOSPITAL COURSE
Pt was received from [facility] to [facility] inpatient after an overdose attempt by taking 20 tablets of labetalol. Of note, pt's last hospitalization was due to the same presentation of overdose. On arrival to the unit, pt denied SI/AVH, though reported some vague paranoia regarding anxiety over being inpatient. Pt was received from [facility] to 4 inpatient after an overdose attempt by taking 20 tablets of labetalol. Of note, pt's last hospitalization was due to the same presentation of overdose. On arrival to the unit, pt denied SI/AVH, though reported some vague paranoia regarding anxiety over being inpatient. C/L chart review showed that pt had denied SI/AVH for the past few days as well. No medication changes were made at this time, though consideration to increase lexapro was made, though pt declined during hospitalization, stating that he felt "ok." Pt was seen visible on the unit, attending groups and participating appropriately.   Treatment team felt that pt would benefit from higher level of care, with pt's outpatient treatment team in agreement. Complete treatment team meeting was held, with NP, SW, SW manager, , housing, pt's sisters, and pt all in attendance.     Pt seen by [facility] PHP to assess for appropriateness for program, with PHP and pt in agreement to continue treatment together upon discharge. To reduce risk of lapse in treatment and to reduce risk of decompensation upon discharge, treatment team agreed to discharge pt one day prior to PHP start date.     During hospitalization, pt was seen by wound care nurse for closed wound to back of head, previously treated by [facility]. Recommendation for bacitracin QD x5 days as wound was closed, dry, non weeping. No new wounds noted or reported during hospitalization. Pt's Na was monitored due to chronic hyponatremia, typically falling around 130. Pt was placed on fluid restriction of 1500ml, needing encouragement and reminding of fluid restriction. During hospitalization, pt denied any symptoms of hyponatremia.     Treatment Plan  1. admitted to unit, legal status   2. safety  - routine checks as pt denies SIIP/HIIP and is able to contract for safety  - haldol and ativan PO/IM PRN for agitation and anxiety  3. psychiatric  - c/w haldol 5mg for psychosis  - haldol dec 200mg Q3w last given 9/18, next due 10/9  - c/w lexapro 10mg  - past trials: abilify (worsening paranoia)  4. medical  -chronic hyponatremia: Na low 131, on fluid restriction < 1.5L   - hypothyroidism: levothyroxine 50mcg  - HLD: lipitor 40mg QHS  - HTN: labetalol 200mg BID, amlodipine 10mg   - DM2: metformin 500mg BID  - MRSA: s/p scalp I/D on 9/8 with recommendations from C/L   	- c/w clindamycin 450mg PO every 8 hours x 5 days (started 9/10/23)  	- wound consult ordered at [facility] 9/11, recommended bacitracin ointment QD x5 days (started 9/12/24)  - CVID: monthly IVIG (typically Gammagard S/D formula); last dose given 9/9/24, next due 10/7 (follows with Dr. Mitchell Boxer)  5. encourage I/G/M therapy  6. dispo  - outpatient treatment team meeting Thursday 9/26 at 12:30  - wound care: [facility]: [phone]. Address: [address] [place] [address]  - start PHP 10/1  - discharge 9/30 with one week supply of medications   Pt was received from [facility] to 4 inpatient after an overdose attempt by taking 20 tablets of labetalol. Of note, pt's last hospitalization was due to the same presentation of overdose. On arrival to the unit, pt denied SI/AVH, though reported some vague paranoia regarding anxiety over being inpatient. C/L chart review showed that pt had denied SI/AVH for the past few days as well. No medication changes were made at this time, though consideration to increase lexapro was made, though pt declined during hospitalization, stating that he felt "ok." Pt was seen visible on the unit, attending groups and participating appropriately.   Treatment team felt that pt would benefit from higher level of care, with pt's outpatient treatment team in agreement. Complete treatment team meeting was held, with NP, SW, SW manager, , TSI housing, pt's sisters, and pt all in attendance. Plan for PHP was discussed with pt in agreement. TSI discussed need for higher level of care with plan to transition to apartment treatment program to provide pt with daily medication assistance and 24/7 availability to their supports. Pt reports that he does not want to "lose my independence" and states that if he is given PRN ativan that he will be able to handle his delusions and paranoid thinking. Crisis plan completed with pt and CM and discussion held over how pt will cope over the weekends when he is receiving less support. Sisters accused treatment team and housing of "coercion" to get pt to agree to higher level of care. Treatment team made pt and sisters aware of continued concerns regarding high risk of pt experiencing another serious overdose which is indication for higher level of care. Sisters stated that they did not want a higher level of care for pt and did not want additional services provided to him.    Pt seen by [facility] PHP to assess for appropriateness for program, with PHP and pt in agreement to continue treatment together upon discharge. To reduce risk of lapse in treatment and to reduce risk of decompensation upon discharge, treatment team agreed to discharge pt one day prior to PHP start date.     During hospitalization, pt was seen by wound care nurse for closed wound to back of head, previously treated by [facility]. Recommendation for bacitracin QD x5 days as wound was closed, dry, non weeping. No new wounds noted or reported during hospitalization. Pt's Na was monitored due to chronic hyponatremia, typically falling around 130. Pt was placed on fluid restriction of 1500ml, needing encouragement and reminding of fluid restriction. During hospitalization, pt denied any symptoms of hyponatremia.     Treatment Plan  1. admitted to unit, legal status   2. safety  - routine checks as pt denies SIIP/HIIP and is able to contract for safety  - haldol and ativan PO/IM PRN for agitation and anxiety  3. psychiatric  - c/w haldol 5mg for psychosis  - haldol dec 200mg Q3w last given 9/18, next due 10/9  - c/w lexapro 10mg  - past trials: abilify (worsening paranoia)  4. medical  -chronic hyponatremia: Na low 131, on fluid restriction < 1.5L   - hypothyroidism: levothyroxine 50mcg  - HLD: lipitor 40mg QHS  - HTN: labetalol 200mg BID, amlodipine 10mg   - DM2: metformin 500mg BID  - MRSA: s/p scalp I/D on 9/8 with recommendations from C/L   	- c/w clindamycin 450mg PO every 8 hours x 5 days (started 9/10/23)  	- wound consult ordered at [facility] 9/11, recommended bacitracin ointment QD x5 days (started 9/12/24)  - CVID: monthly IVIG (typically Gammagard S/D formula); last dose given 9/9/24, next due 10/7 (follows with Dr. Mitchell Boxer)  5. encourage I/G/M therapy  6. dispo  - outpatient treatment team meeting Thursday 9/26 at 12:30  - wound care: [facility]: [phone]. Address: [address] [place] [address]  - start PHP 10/1  - discharge 9/30 with one week supply of medications   Pt was received from [facility] to 4 inpatient after an overdose attempt by taking 20 tablets of labetalol. Of note, pt's last hospitalization was due to the same presentation of overdose. On arrival to the unit, pt denied SI/AVH, though reported some vague paranoia regarding anxiety over being inpatient. C/L chart review showed that pt had denied SI/AVH for the past few days as well. No medication changes were made at this time, though consideration to increase lexapro was made, though pt declined during hospitalization, stating that he felt "ok." Pt was seen visible on the unit, attending groups and participating appropriately.   Treatment team felt that pt would benefit from higher level of care, with pt's outpatient treatment team in agreement. Complete treatment team meeting was held, with NP, SW, SW manager, , TSI housing, pt's sisters, and pt all in attendance. Plan for PHP was discussed with pt in agreement. TSI discussed need for higher level of care with plan to transition to apartment treatment program to provide pt with daily medication assistance and 24/7 availability to their supports. Pt reports that he does not want to "lose my independence" and states that if he is given PRN ativan that he will be able to handle his delusions and paranoid thinking. Crisis plan completed with pt and CM and discussion held over how pt will cope over the weekends when he is receiving less support. Sisters accused treatment team and housing of "coercion" to get pt to agree to higher level of care, as pt was made aware that NP did not feel comfortable prescribing pt more medication than necessary without medication assistance made available due to pt's history of OD on medications. Treatment team made pt and sisters aware of continued concerns regarding high risk of pt experiencing another serious overdose which is indication for higher level of care, along with high rate of ED visits due to mental health concerns and SI. Sisters stated that they did not want a higher level of care for pt and did not want additional services provided to him outside what was previously established. Pt and sisters declined apartment treatment program housing despite recommendation.     Pt seen by [facility] PHP to assess for appropriateness for program, with PHP and pt in agreement to continue treatment together upon discharge. To reduce risk of lapse in treatment and to reduce risk of decompensation upon discharge, treatment team agreed to discharge pt one day prior to PHP start date.     During hospitalization, pt was seen by wound care nurse for closed wound to back of head, previously treated by [facility]. Recommendation for bacitracin QD x5 days as wound was closed, dry, non weeping. No new wounds noted or reported during hospitalization. Pt's Na was monitored due to chronic hyponatremia, typically falling around 130. Pt was placed on fluid restriction of 1500ml, needing encouragement and reminding of fluid restriction. During hospitalization, pt denied any symptoms of hyponatremia.     Treatment Plan  1. admitted to unit, legal status   2. safety  - routine checks as pt denies SIIP/HIIP and is able to contract for safety  - haldol and ativan PO/IM PRN for agitation and anxiety  3. psychiatric  - c/w haldol 5mg for psychosis  - haldol dec 200mg Q3w last given 9/18, next due 10/9  - c/w lexapro 10mg  - past trials: abilify (worsening paranoia)  4. medical  -chronic hyponatremia: Na low 131, on fluid restriction < 1.5L   - hypothyroidism: levothyroxine 50mcg  - HLD: lipitor 40mg QHS  - HTN: labetalol 200mg BID, amlodipine 10mg   - DM2: metformin 500mg BID  - MRSA: s/p scalp I/D on 9/8 with recommendations from C/L   	- c/w clindamycin 450mg PO every 8 hours x 5 days (started 9/10/23)  	- wound consult ordered at [facility] 9/11, recommended bacitracin ointment QD x5 days (started 9/12/24)  - CVID: monthly IVIG (typically Gammagard S/D formula); last dose given 9/9/24, next due 10/7 (follows with Dr. Mitchell Boxer)  5. encourage I/G/M therapy  6. dispo  - discharge 9/30 with one week supply of medications  - PHP intake 10/1 at 11:30am  - Dr. Mitchell Boxer (allergist and immunologist) [address] [place] [address], [phone] at 10/8 at 12:15  	- message left with MD regarding finding availability to schedule pt for 10/7 (office to contact pt if availability can be made)  - follow up with wound care as needed:   	- [address] [place], [phone]  - follow up with PCP Dr. Ketan Mcclendon as needed:  	- [address], [place],    Pt was received from [facility] to 4 inpatient after an overdose attempt by taking 20 tablets of labetalol. Of note, pt's last hospitalization was due to the same presentation of overdose. On arrival to the unit, pt denied SI/AVH, though reported some vague paranoia regarding anxiety over being inpatient. C/L chart review showed that pt had denied SI/AVH for the past few days as well. No medication changes were made at this time, though consideration to increase lexapro was made, though pt declined during hospitalization, stating that he felt "ok." Pt was seen visible on the unit, attending groups and participating appropriately.   Treatment team felt that pt would benefit from higher level of care, with pt's outpatient treatment team in agreement. Complete treatment team meeting was held, with NP, SW, SW manager, , TSI housing, pt's sisters, and pt all in attendance. Plan for PHP was discussed with pt in agreement. TSI discussed need for higher level of care with plan to transition to apartment treatment program to provide pt with daily medication assistance and 24/7 availability to their supports. Pt reports that he does not want to "lose my independence" and states that if he is given PRN ativan that he will be able to handle his delusions and paranoid thinking. Crisis plan completed with pt and CM and discussion held over how pt will cope over the weekends when he is receiving less support. Sisters accused treatment team and housing of "coercion" to get pt to agree to higher level of care, as pt was made aware that NP did not feel comfortable prescribing pt more medication than necessary without medication assistance made available due to pt's history of OD on medications. Treatment team made pt and sisters aware of continued concerns regarding high risk of pt experiencing another serious overdose which is indication for higher level of care, along with high rate of ED visits due to mental health concerns and SI. Sisters stated that they did not want a higher level of care for pt and did not want additional services provided to him outside what was previously established. Pt and sisters declined apartment treatment program housing despite recommendation. Despite safety concerns raised during meeting, it is accepted that it is the pt's decision regarding level of housing.      Pt seen by [facility] PHP to assess for appropriateness for program, with PHP and pt in agreement to continue treatment together upon discharge. To reduce risk of lapse in treatment and to reduce risk of decompensation upon discharge, treatment team agreed to discharge pt one day prior to PHP start date.     During hospitalization, pt was seen by wound care nurse for closed wound to back of head, previously treated by [facility]. Recommendation for bacitracin QD x5 days as wound was closed, dry, non weeping. No new wounds noted or reported during hospitalization. Pt's Na was monitored due to chronic hyponatremia, typically falling around 130. Pt was placed on fluid restriction of 1500ml, needing encouragement and reminding of fluid restriction. During hospitalization, pt denied any symptoms of hyponatremia.     Treatment Plan  1. admitted to unit, legal status   2. safety  - routine checks as pt denies SIIP/HIIP and is able to contract for safety  - haldol and ativan PO/IM PRN for agitation and anxiety  3. psychiatric  - haldol dec 200mg Q3w last given 9/18, next due 10/9  - c/w lexapro 10mg  - past trials: abilify (worsening paranoia)  4. medical  -chronic hyponatremia: Na low 131, on fluid restriction < 1.5L   - hypothyroidism: levothyroxine 50mcg  - HLD: lipitor 40mg QHS  - HTN: labetalol 200mg BID, amlodipine 10mg   - DM2: metformin 500mg BID  - MRSA: s/p scalp I/D on 9/8 with recommendations from C/L   	- c/w clindamycin 450mg PO every 8 hours x 5 days (started 9/10/23)  	- wound consult ordered at [facility] 9/11, recommended bacitracin ointment QD x5 days (started 9/12/24)  - CVID: monthly IVIG (typically Gammagard S/D formula); last dose given 9/9/24, next due 10/7 (follows with Dr. Mitchell Boxer)  5. encourage I/G/M therapy  6. dispo  - discharge 9/30 with one week supply of medications  - PHP intake 10/1 at 11:30am  - Dr. Mitchell Boxer (allergist and immunologist) [address] [place] [address], [phone] at 10/8 at 12:15  	- message left with MD regarding finding availability to schedule pt for 10/7 (office to contact pt if availability can be made)  - follow up with wound care as needed:   	- [address] [place], [phone]  - follow up with PCP Dr. Ketan Mcclendon as needed:  	- [address], [place],    Pt was received from [facility] to 4 inpatient after an overdose attempt by taking 20 tablets of labetalol. Of note, pt's last hospitalization was due to the same presentation of overdose. On arrival to the unit, pt denied SI/AVH, though reported some vague paranoia regarding anxiety over being inpatient. C/L chart review showed that pt had denied SI/AVH for the past few days as well. HAldol 5mg PO was continued till pt received halded dec 200mg BELLA (last given 9/18, next due 10/9) then stopped, and consideration to increase lexapro was made, though pt declined during hospitalization, stating that he felt "ok." Pt was seen visible on the unit, attending groups and participating appropriately.    Treatment team felt that pt would benefit from higher level of care, with pt's outpatient treatment team in agreement. Complete treatment team meeting was held, with NP, SW, SW manager, , TSI housing, pt's sisters, and pt all in attendance. Plan for PHP was discussed with pt in agreement. TSI discussed need for higher level of care with plan to transition to apartment treatment program to provide pt with daily medication assistance and 24/7 availability to their supports. Pt reports that he does not want to "lose my independence" and states that if he is given PRN ativan that he will be able to handle his delusions and paranoid thinking. Crisis plan completed with pt and CM and discussion held over how pt will cope over the weekends when he is receiving less support. Sisters accused treatment team and housing of "coercion" to get pt to agree to higher level of care, as pt was made aware that NP did not feel comfortable prescribing pt more medication than necessary without medication assistance made available due to pt's history of OD on medications. Treatment team made pt and sisters aware of continued concerns regarding high risk of pt experiencing another serious overdose which is indication for higher level of care, along with high rate of ED visits due to mental health concerns and SI. Sisters stated that they did not want a higher level of care for pt and did not want additional services provided to him outside what was previously established. Pt and sisters declined apartment treatment program housing despite recommendation. Despite safety concerns raised during meeting, it is accepted that it is the pt's decision regarding level of housing.      Pt seen by [facility] PHP to assess for appropriateness for program, with PHP and pt in agreement to continue treatment together upon discharge. To reduce risk of lapse in treatment and to reduce risk of decompensation upon discharge, treatment team agreed to discharge pt one day prior to PHP start date.     During hospitalization, pt was seen by wound care nurse for closed wound to back of head, previously treated by [facility]. Recommendation for bacitracin QD x5 days as wound was closed, dry, non weeping. No new wounds noted or reported during hospitalization. Pt's Na was monitored due to chronic hyponatremia, typically falling around 130. Pt was placed on fluid restriction of 1500ml, needing encouragement and reminding of fluid restriction. During hospitalization, pt denied any symptoms of hyponatremia. One time lokelma 5mg was given due to elevated K of 5.4 along with EKG to assess for any cardiac changes, with pt denying any side effects of hyperkalemia. BMP was redrawn the next day, WNL of 4.4 and no further elevations noted during hospitalization.     Treatment Plan  1. admitted to unit, legal status   2. safety  - routine checks as pt denies SIIP/HIIP and is able to contract for safety  - haldol and ativan PO/IM PRN for agitation and anxiety  3. psychiatric  - haldol dec 200mg Q3w last given 9/18, next due 10/9  - c/w lexapro 10mg  - past trials: abilify (worsening paranoia)  4. medical  -chronic hyponatremia: Na low 130s, on fluid restriction < 1.5L   - hypothyroidism: levothyroxine 50mcg  - HLD: lipitor 40mg QHS  - HTN: labetalol 200mg BID, amlodipine 10mg   - DM2: metformin 500mg BID  - MRSA: s/p scalp I/D on 9/8 with recommendations from C/L   	- c/w clindamycin 450mg PO every 8 hours x 5 days (started 9/10/23)  	- wound consult ordered at [facility] 9/11, recommended bacitracin ointment QD x5 days (started 9/12/24)  - CVID: monthly IVIG (typically Gammagard S/D formula); last dose given 9/9/24, next due 10/7 (follows with Dr. Mitchell Boxer)  5. encourage I/G/M therapy  6. dispo  - discharge 9/30 with one week supply of medications  - PHP intake 10/1 at 11:30am  - Dr. Mitchell Boxer (allergist and immunologist) [address] [place] [address], [phone] at 10/8 at 12:15  	- message left with MD regarding finding availability to schedule pt for 10/7 (office to contact pt if availability can be made)  - follow up with wound care as needed:   	- [address] [place], [phone]  - follow up with PCP Dr. Ketan Mcclendon as needed:  	- [address], [place],    Pt was received from [facility] to 4 inpatient after an overdose attempt by taking 20 tablets of labetalol. Of note, pt's last hospitalization was due to the same presentation of overdose. On arrival to the unit, pt denied SI/AVH, though reported some vague paranoia regarding anxiety over being inpatient. C/L chart review showed that pt had denied SI/AVH for the past few days as well. HAldol 5mg PO was continued till pt received halded dec 200mg BELLA (last given 9/18, next due 10/9) then stopped, and consideration to increase lexapro was made, though pt declined during hospitalization, stating that he felt "ok." Pt was seen visible on the unit, attending groups and participating appropriately.    Treatment team felt that pt would benefit from higher level of care, with pt's outpatient treatment team in agreement. Complete treatment team meeting was held, with NP, SW, SW manager, , TSI housing, pt's sisters, and pt all in attendance. Plan for PHP was discussed with pt in agreement. TSI discussed need for higher level of care with plan to transition to apartment treatment program to provide pt with daily medication assistance and 24/7 availability to their supports. Pt reports that he does not want to "lose my independence" and states that if he is given PRN ativan that he will be able to handle his delusions and paranoid thinking. Crisis plan completed with pt and CM and discussion held over how pt will cope over the weekends when he is receiving less support. Sisters accused treatment team and housing of "coercion" to get pt to agree to higher level of care, as pt was made aware that NP did not feel comfortable prescribing pt more medication than necessary without medication assistance made available due to pt's history of OD on medications. Treatment team made pt and sisters aware of continued concerns regarding high risk of pt experiencing another serious overdose which is indication for higher level of care, along with high rate of ED visits due to mental health concerns and SI. Sisters stated that they did not want a higher level of care for pt and did not want additional services provided to him outside what was previously established. Pt and sisters declined apartment treatment program housing despite recommendation. Despite safety concerns raised during meeting, it is accepted that it is the pt's decision regarding level of housing. NP, SW, SW manager, and TSI housing discussed safety concerns raised during treatment team, with SW manager stating intent to submit ACS report upon pt's discharge, as sisters threatened to remove pt from all available services towards the end of the treatment team meeting when all of pt's supports (excluding the sisters) stressed the importance of higher level of care to reduce the risk of another OD.    Pt seen by [facility] PHP to assess for appropriateness for program, with PHP and pt in agreement to continue treatment together upon discharge. To reduce risk of lapse in treatment and to reduce risk of decompensation upon discharge, treatment team agreed to discharge pt one day prior to PHP start date.     During hospitalization, pt was seen by wound care nurse for closed wound to back of head, previously treated by [facility]. Recommendation for bacitracin QD x5 days as wound was closed, dry, non weeping. No new wounds noted or reported during hospitalization. Pt's Na was monitored due to chronic hyponatremia, typically falling around 130. Pt was placed on fluid restriction of 1500ml, needing encouragement and reminding of fluid restriction. During hospitalization, pt denied any symptoms of hyponatremia. One time lokelma 5mg was given due to elevated K of 5.4 along with EKG to assess for any cardiac changes, with pt denying any side effects of hyperkalemia. BMP was redrawn the next day, WNL of 4.4 and no further elevations noted during hospitalization.     Treatment Plan  1. admitted to unit, legal status   2. safety  - routine checks as pt denies SIIP/HIIP and is able to contract for safety  - haldol and ativan PO/IM PRN for agitation and anxiety  3. psychiatric  - haldol dec 200mg Q3w last given 9/18, next due 10/9  - c/w lexapro 10mg  - past trials: abilify (worsening paranoia)  4. medical  -chronic hyponatremia: Na low 130s, on fluid restriction < 1.5L   - hypothyroidism: levothyroxine 50mcg  - HLD: lipitor 40mg QHS  - HTN: labetalol 200mg BID, amlodipine 10mg   - DM2: metformin 500mg BID  - MRSA: s/p scalp I/D on 9/8 with recommendations from C/L   	- c/w clindamycin 450mg PO every 8 hours x 5 days (started 9/10/23)  	- wound consult ordered at [facility] 9/11, recommended bacitracin ointment QD x5 days (started 9/12/24)  - CVID: monthly IVIG (typically Gammagard S/D formula); last dose given 9/9/24, next due 10/7 (follows with Dr. Mitchell Boxer)  5. encourage I/G/M therapy  6. dispo  - discharge 9/30 with one week supply of medications  - PHP intake 10/1 at 11:30am  - Dr. Mitchell Boxer (allergist and immunologist) [address] [place] [address], [phone] at 10/8 at 12:15  	- message left with MD regarding finding availability to schedule pt for 10/7 (office to contact pt if availability can be made)  - follow up with wound care as needed:   	- [address] [place], [phone]  - follow up with PCP Dr. Ketan Mcclendon as needed:  	- [address], [place],

## 2024-09-25 NOTE — BH INPATIENT PSYCHIATRY DISCHARGE NOTE - NSBHSUICIDESTATUS_PSY_ALL_CORE
pt currently denies SI/HI/AVH and states that he feels safe for discharge. no unsafe behaviors noted while inpatient

## 2024-09-25 NOTE — BH INPATIENT PSYCHIATRY DISCHARGE NOTE - ATTENDING DISCHARGE PHYSICAL EXAMINATION:
I have personally seen and evaluated patient prior to discharge. Chart reviewed and discharge plan discussed with the NP as follows. Pt prior to discharge was calm, cooperative, friendly and smiling. Patient has been attending groups with active participation.  Pt interacting well with others. No recent behavioral problems and no episodes of aggressive or dangerous behavior. No problems with sleep, appetite or energy level. Denied any active and current manic, hypomanic, depressive, anxiety sxs. Pt with chronic delusions at baseline, however have improved in intensity during admission. Denied any current SI/HI/AH/VH/PI.   Patient is organized and commits to safety and to ongoing outpatient treatment. Had a family meeting prior to discharge and his sisters deny acute safety concerns.   Pt asks relevant questions about his discharge plan and about the medication regimen. Pt articulate a plan for living life after discharge. Medication risks/benefits/side effects were discussed with the patient.  Patient expressed understanding and agreed with the treatment plan. Further, instructed the patient to seek help immediately by dialing "911" or by going to the nearest ER if symptoms worsen.   Chronic risk factors include hx of schizoaffective d/o, hx of trauma, prior hx of impulsive SA, recent impulsive  SA via overdose prior to admission   Protective factors include domiciled, supportive friends/family network, compliant with psychiatric medications, motivated to engage in outpateint psychiatric treatment, future oriented thinking, expresses concern for well being,  much aspirations to begin PHP, return to prior baseline functioning, and wish to pursue pleasurable activities.  As such, its chronic risk factors are mitigated by their protective factors. Pt does not pose an acute elevated risk of imminent danger to harm to self or others. Does not require further inpatient psychiatric admission at this time. Psychiatrically cleared for discharge.   Pt urged to avoid using any alcohol or street drugs, particularly marijuana & K2, cocaine and methamphetamine (crystal meth) once discharged.  Safety plan filled out by patient and submitted into chart.   negative

## 2024-09-25 NOTE — BH INPATIENT PSYCHIATRY PROGRESS NOTE - NSBHMETABOLIC_PSY_ALL_CORE_FT
BMI: BMI (kg/m2): 30.4 (09-11-24 @ 14:40)  HbA1c: A1C with Estimated Average Glucose Result: 5.4 % (09-12-24 @ 10:48)    Glucose: POCT Blood Glucose.: 113 mg/dL (09-25-24 @ 08:08)    BP: --Vital Signs Last 24 Hrs  T(C): 36.4 (09-24-24 @ 19:41), Max: 36.4 (09-24-24 @ 19:41)  T(F): 97.6 (09-24-24 @ 19:41), Max: 97.6 (09-24-24 @ 19:41)  HR: --  BP: --  BP(mean): --  RR: --  SpO2: --    Orthostatic VS  09-24-24 @ 19:41  Lying BP: --/-- HR: --  Sitting BP: 126/67 HR: 61  Standing BP: 109/63 HR: 76  Site: --  Mode: --  Orthostatic VS  09-24-24 @ 08:16  Lying BP: --/-- HR: --  Sitting BP: 115/74 HR: 66  Standing BP: 98/61 HR: 70  Site: --  Mode: --  Orthostatic VS  09-23-24 @ 19:15  Lying BP: --/-- HR: --  Sitting BP: 105/65 HR: 60  Standing BP: 97/60 HR: 67  Site: --  Mode: --    Lipid Panel: Date/Time: 09-12-24 @ 10:48  Cholesterol, Serum: 74  LDL Cholesterol Calculated: 33  HDL Cholesterol, Serum: 28  Total Cholesterol/HDL Ration Measurement: --  Triglycerides, Serum: 67   BMI: BMI (kg/m2): 30.4 (09-11-24 @ 14:40)  HbA1c: A1C with Estimated Average Glucose Result: 5.4 % (09-12-24 @ 10:48)    Glucose: POCT Blood Glucose.: 108 mg/dL (09-26-24 @ 07:25)    BP: --Vital Signs Last 24 Hrs  T(C): 36.6 (09-26-24 @ 09:00), Max: 36.6 (09-26-24 @ 09:00)  T(F): 97.8 (09-26-24 @ 09:00), Max: 97.8 (09-26-24 @ 09:00)  HR: --  BP: --  BP(mean): --  RR: 18 (09-26-24 @ 09:00) (18 - 18)  SpO2: --    Orthostatic VS  09-26-24 @ 09:00  Lying BP: --/-- HR: --  Sitting BP: 130/71 HR: 60  Standing BP: 121/62 HR: 72  Site: --  Mode: --  Orthostatic VS  09-25-24 @ 19:09  Lying BP: --/-- HR: --  Sitting BP: 149/84 HR: 64  Standing BP: 113/63 HR: 68  Site: --  Mode: --  Orthostatic VS  09-24-24 @ 19:41  Lying BP: --/-- HR: --  Sitting BP: 126/67 HR: 61  Standing BP: 109/63 HR: 76  Site: --  Mode: --    Lipid Panel: Date/Time: 09-12-24 @ 10:48  Cholesterol, Serum: 74  LDL Cholesterol Calculated: 33  HDL Cholesterol, Serum: 28  Total Cholesterol/HDL Ration Measurement: --  Triglycerides, Serum: 67

## 2024-09-25 NOTE — BH INPATIENT PSYCHIATRY PROGRESS NOTE - NSBHASSESSSUMMFT_PSY_ALL_CORE
57 yo male w PMHx of schizoaffective disorder (bipolar type with multiple [facility] admissions), HTN, HLD, hypothyroidism, CVID/hypogammaglobulinemia (monthly IVIG, last on 7/23 or 9/9), hx of frequent skin infection (MRSA, abscess/cellulitis w MRSA bacteremia s/p debridement 6/2023) presented to the ED with sisters after appearing unwell admitted for concern for suicide attempt. Psych c/s for SI.     Treatment Plan  1. admitted to unit, legal status   2. safety  - routine checks as pt denies SIIP/HIIP and is able to contract for safety  - haldol and ativan PO/IM PRN for agitation and anxiety  3. psychiatric  - c/w haldol 5mg for psychosis  - haldol dec 200mg Q3w last given 9/18, next due 10/16  - c/w lexapro 10mg  - past trials: abilify (worsening paranoia)  4. medical  -chronic hyponatremia: Na low 131, on fluid restriction < 1.5L   - hypothyroidism: levothyroxine 50mcg  - HLD: lipitor 40mg QHS  - HTN: labetalol 200mg BID, amlodipine 10mg   - DM2: metformin 500mg BID  - MRSA: s/p scalp I/D on 9/8 with recommendations from C/L   	- c/w clindamycin 450mg PO every 8 hours x 5 days (started 9/10/23)  	- wound consult ordered at [facility] 9/11, recommended bacitracin ointment QD x5 days (started 9/12/24)  - CVID: monthly IVIG (typically Gammagard S/D formula); last dose given 9/9/24, next due 10/7 (follows with Dr. Mitchell Boxer)  5. encourage I/G/M therapy  6. dispo  - outpatient treatment team meeting Thursday 9/26 at 12:30  - wound care: [facility]: [phone]. Address: [address] [place] [address]  - psychiatry: Dr Hollie Cook with [facility]  - start PHP 10/1  - discharge 9/30 with one week supply of medications                                                                    noia)  4. medical  -chronic hyponatremia: Na low 131, on fluid restriction < 1.5L   - hypothyroidism: levothyroxine 50mcg  - HLD: lipitor 40mg QHS  - HTN: labetalol 200mg BID, amlodipine 10mg   - DM2: metformin 500mg BID  - MRSA: s/p scalp I/D on 9/8 with recommendations from C/L   	- c/w clindamycin 450mg PO every 8 hours x 5 days (started 9/10/23)  	- wound consult ordered at [facility] 9/11, recommended bacitracin ointment QD x5 days (started 9/12/24)  - CVID: monthly IVIG (typically Gammagard S/D formula); last dose given 9/9/23 (follows with Dr. Mitchell Boxer)  5. encourage I/G/M therapy  6. dispo  - wound care: [facility]: [phone]. Address: [address] [place] [address]  - psychiatry: Dr Hollie Cook with [facility]

## 2024-09-25 NOTE — BH INPATIENT PSYCHIATRY DISCHARGE NOTE - NSBHASSESSSUMMFT_PSY_ALL_CORE
... 57 yo male w PMHx of schizoaffective disorder (bipolar type with multiple [facility] admissions), HTN, HLD, hypothyroidism, CVID/hypogammaglobulinemia (monthly IVIG, last on 7/23 or 9/9), hx of frequent skin infection (MRSA, abscess/cellulitis w MRSA bacteremia s/p debridement 6/2023) presented to the ED with sisters after appearing unwell admitted for concern for suicide attempt. Psych c/s for SI.     Treatment Plan  1. Discharge patient  2. psychiatric  - c/w haldol 5mg for psychosis  - haldol dec 200mg Q3w last given 9/18, next due 10/16  - c/w lexapro 10mg  - past trials: abilify (worsening paranoia)  3. medical  -chronic hyponatremia: Na low 131, on fluid restriction < 1.5L   - hypothyroidism: levothyroxine 50mcg  - HLD: lipitor 40mg QHS  - HTN: labetalol 200mg BID, amlodipine 10mg   - DM2: metformin 500mg BID  - MRSA: s/p scalp I/D on 9/8 with recommendations from C/L   	- c/w clindamycin 450mg PO every 8 hours x 5 days (started 9/10/23)  	- wound consult ordered at [facility] 9/11, recommended bacitracin ointment QD x5 days (started 9/12/24)  - CVID: monthly IVIG (typically Gammagard S/D formula); last dose given 9/9/24, next due 10/7 (follows with Dr. Mitchell Boxer)  - outpatient treatment team meeting Thursday 9/26 at 12:30  4. Dispo  - wound care: [facility]: [phone]. Address: [address] [place] [address]  - psychiatry: Dr Hollie Cook with [facility]  - start PHP 10/1  - discharge 9/30 with one week supply of medications

## 2024-09-25 NOTE — BH INPATIENT PSYCHIATRY DISCHARGE NOTE - NSDCMRMEDTOKEN_GEN_ALL_CORE_FT
benztropine 1 mg oral tablet: 1 tab(s) orally once a day (at bedtime)  clindamycin 150 mg oral capsule: 3 cap(s) orally every 8 hours LAST DAY 9/15/24  Flomax 0.4 mg oral capsule: 1 cap(s) orally once a day (at bedtime)  haloperidol 5 mg oral tablet: 1 tab(s) orally once a day (at bedtime)  labetalol 200 mg oral tablet: 1 tab(s) orally 2 times a day  Lexapro 10 mg oral tablet: 1 tab(s) orally once a day  Lipitor 40 mg oral tablet: 1 tab(s) orally once a day (at bedtime)  melatonin 3 mg oral tablet: 1 tab(s) orally once a day (at bedtime) As needed Insomnia  metFORMIN 500 mg oral tablet: 1 tab(s) orally 2 times a day  Norvasc 10 mg oral tablet: 1 tab(s) orally once a day  polyethylene glycol 3350 oral powder for reconstitution: 17 gram(s) orally once a day As needed constipation  Synthroid 50 mcg (0.05 mg) oral tablet: 1 tab(s) orally once a day   amLODIPine 10 mg oral tablet: 1 tab(s) orally once a day  atorvastatin 40 mg oral tablet: 1 tab(s) orally once a day (at bedtime)  benztropine 1 mg oral tablet: 1 tab(s) orally once a day (at bedtime)  escitalopram 10 mg oral tablet: 1 tab(s) orally once a day  labetalol 200 mg oral tablet: 1 tab(s) orally 2 times a day  levothyroxine 50 mcg (0.05 mg) oral tablet: 1 tab(s) orally once a day  metFORMIN 500 mg oral tablet: 1 tab(s) orally 2 times a day  tamsulosin 0.4 mg oral capsule: 1 cap(s) orally once a day (at bedtime)

## 2024-09-25 NOTE — BH INPATIENT PSYCHIATRY DISCHARGE NOTE - NSDCCPCAREPLAN_GEN_ALL_CORE_FT
PRINCIPAL DISCHARGE DIAGNOSIS  Diagnosis: Schizoaffective disorder, bipolar type  Assessment and Plan of Treatment:       SECONDARY DISCHARGE DIAGNOSES  Diagnosis: HLD (hyperlipidemia)  Assessment and Plan of Treatment:     Diagnosis: Hypothyroidism  Assessment and Plan of Treatment:     Diagnosis: CVID (common variable immunodeficiency)  Assessment and Plan of Treatment:     Diagnosis: HTN (hypertension)  Assessment and Plan of Treatment:
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
## 2024-09-26 LAB — GLUCOSE BLDC GLUCOMTR-MCNC: 108 MG/DL — HIGH (ref 70–99)

## 2024-09-26 PROCEDURE — 99232 SBSQ HOSP IP/OBS MODERATE 35: CPT | Mod: FS

## 2024-09-26 PROCEDURE — 90853 GROUP PSYCHOTHERAPY: CPT

## 2024-09-26 RX ADMIN — LABETALOL HYDROCHLORIDE 200 MILLIGRAM(S): 200 TABLET, FILM COATED ORAL at 20:34

## 2024-09-26 RX ADMIN — ATORVASTATIN CALCIUM 40 MILLIGRAM(S): 10 TABLET, FILM COATED ORAL at 20:34

## 2024-09-26 RX ADMIN — Medication 500 MILLIGRAM(S): at 20:34

## 2024-09-26 RX ADMIN — Medication 0.4 MILLIGRAM(S): at 20:34

## 2024-09-26 RX ADMIN — Medication 17 GRAM(S): at 09:01

## 2024-09-26 RX ADMIN — Medication 10 MILLIGRAM(S): at 09:04

## 2024-09-26 RX ADMIN — Medication 500 MILLIGRAM(S): at 09:04

## 2024-09-26 RX ADMIN — LABETALOL HYDROCHLORIDE 200 MILLIGRAM(S): 200 TABLET, FILM COATED ORAL at 09:04

## 2024-09-26 RX ADMIN — Medication 50 MICROGRAM(S): at 06:23

## 2024-09-26 RX ADMIN — Medication 1 MILLIGRAM(S): at 20:34

## 2024-09-26 RX ADMIN — Medication 2 TABLET(S): at 20:34

## 2024-09-26 RX ADMIN — Medication 3 MILLIGRAM(S): at 20:34

## 2024-09-26 NOTE — BH PSYCHOLOGY - GROUP THERAPY NOTE - NSBHPSYCHOLASSESSPROV_PSY_A_CORE
Licensed Psychologist
Licensed Psychologist
Licensed Psychologist and Psychology Trainee
Licensed Psychologist and Psychology Trainee

## 2024-09-26 NOTE — BH PSYCHOLOGY - GROUP THERAPY NOTE - NSBHPSYCHOLPARTICIPCOMMENT_PSY_A_CORE FT
Pt participated independently  

## 2024-09-26 NOTE — BH INPATIENT PSYCHIATRY PROGRESS NOTE - NSBHFUPINTERVALHXFT_PSY_A_CORE
Chart and history reviewed and discussed with treatment team. No events reported overnight. Discharge meeting held with pt, pt's two sisters Abigail, NP, SW manager, LA Figueroa, and Sheron from [facility]. Plan for PHP was discussed with pt in agreement. TSI discussed need for higher level of care with plan to transition to apartment treatment program to provide pt with daily medication assistance and 24/7 availability to their supports. Pt reports that he does not want to "lose my independence" and states that if he is given PRN ativan that he will be able to handle his delusions and paranoid thinking. Crisis plan completed with pt and CM and discussion held over how pt will cope over the weekends when he is receiving less support. Sisters accused treatment team and housing of "coercion" to get pt to agree to higher level of care. Treatment team made pt and sisters aware of continued concerns regarding high risk of pt experiencing another serious overdose which is indication for higher level of care. Sisters stated that they did not want a higher level of care for pt and did not want additional services provided to him. No behavioral issues noted. Medication compliant, tolerating well, free of EPS. Remains on fluid restriction <1.5L. Sleep and appetite maintained. Denies SI/HI/AVH. Continue to monitor for safety.  Chart and history reviewed and discussed with treatment team. No events reported overnight. Discharge meeting held with pt, pt's two sisters Abigail, NP, SW manager, LA Figueroa, and Sheron from [facility]. Plan for PHP was discussed with pt in agreement. TSI discussed need for higher level of care with plan to transition to apartment treatment program to provide pt with daily medication assistance and 24/7 availability to their supports. Pt reports that he does not want to "lose my independence" and states that if he is given PRN ativan that he will be able to handle his delusions and paranoid thinking. NP informed pt that he would not be discharged with additional medications due to his high risk of OD via meds. Crisis plan completed with pt and CM and discussion held over how pt will cope over the weekends when he is receiving less support. Sisters accused treatment team and housing of "coercion" to get pt to agree to higher level of care. Treatment team made pt and sisters aware of continued concerns regarding high risk of pt experiencing another serious overdose which is indication for higher level of care. Sisters stated that they did not want a higher level of care for pt and did not want additional services provided to him, though did not provide adequate information as to why. Sisters presented irritable with treatment team, superficially cooperative and dismissive of treatment teams safety concerns. Despite safety concerns posed during meeting, it is accepted that it is the pt's decision regarding level of housing. No behavioral issues noted. Medication compliant, tolerating well, free of EPS. Remains on fluid restriction <1.5L. Sleep and appetite maintained. Denies SI/HI/AVH. Continue to monitor for safety.  Chart and history reviewed and discussed with treatment team. No events reported overnight. Discharge meeting held with pt, pt's two sisters Abigail, NP, SW manager, LA Figueroa, and Sheron from \[facility]\"". Plan for PHP was discussed with pt in agreement. TSI discussed need for higher level of care with plan to transition to apartment treatment program to provide pt with daily medication assistance and 24/7 availability to their supports. Pt reports that he does not want to "lose my independence" and states that if he is given PRN ativan that he will be able to handle his delusions and paranoid thinking. NP informed pt that he would not be discharged with additional medications due to his high risk of OD via meds. Crisis plan completed with pt and CM and discussion held over how pt will cope over the weekends when he is receiving less support. Sisters accused treatment team and housing of "coercion" to get pt to agree to higher level of care. Treatment team made pt and sisters aware of continued concerns regarding high risk of pt experiencing another serious overdose which is indication for higher level of care. Sisters stated that they did not want a higher level of care for pt and did not want additional services provided to him, though did not provide adequate information as to why. Sisters presented irritable with treatment team, superficially cooperative and dismissive of treatment teams safety concerns. Despite safety concerns posed during meeting, it is accepted that it is the pt's decision regarding level of housing. No behavioral issues noted. Medication compliant, tolerating well, free of EPS. Remains on fluid restriction <1.5L. Sleep and appetite maintained. Denies SI/HI/AVH. Continue to monitor for safety.     SW manager discussed concerns regarding pt's sisters with SW, NP, and \[facility]\"" housing. SW manager stated that she plans to submit ACS report upon pt's discharge, as sisters threatened to remove pt from all available services towards the end of the treatment team meeting when treatment team stressed the importance of higher level of care to reduce the risk of another OD.

## 2024-09-26 NOTE — BH INPATIENT PSYCHIATRY PROGRESS NOTE - NSBHASSESSSUMMFT_PSY_ALL_CORE
57 yo male w PMHx of schizoaffective disorder (bipolar type with multiple [facility] admissions), HTN, HLD, hypothyroidism, CVID/hypogammaglobulinemia (monthly IVIG, last on 7/23 or 9/9), hx of frequent skin infection (MRSA, abscess/cellulitis w MRSA bacteremia s/p debridement 6/2023) presented to the ED with sisters after appearing unwell admitted for concern for suicide attempt. Psych c/s for SI.     Treatment Plan  1. admitted to unit, legal status   2. safety  - routine checks as pt denies SIIP/HIIP and is able to contract for safety  - haldol and ativan PO/IM PRN for agitation and anxiety  3. psychiatric  - c/w haldol 5mg for psychosis  - haldol dec 200mg Q3w last given 9/18, next due 10/16  - c/w lexapro 10mg  - past trials: abilify (worsening paranoia)  4. medical  -chronic hyponatremia: Na low 131, on fluid restriction < 1.5L   - hypothyroidism: levothyroxine 50mcg  - HLD: lipitor 40mg QHS  - HTN: labetalol 200mg BID, amlodipine 10mg   - DM2: metformin 500mg BID  - MRSA: s/p scalp I/D on 9/8 with recommendations from C/L   	- c/w clindamycin 450mg PO every 8 hours x 5 days (started 9/10/23)  	- wound consult ordered at [facility] 9/11, recommended bacitracin ointment QD x5 days (started 9/12/24)  - CVID: monthly IVIG (typically Gammagard S/D formula); last dose given 9/9/24, next due 10/7 (follows with Dr. Mitchell Boxer)  5. encourage I/G/M therapy  6. dispo  - outpatient treatment team meeting Thursday 9/26 at 12:30  - wound care: [facility]: [phone]. Address: [address] [place] [address]  - psychiatry: Dr Hollie Cook with [facility]  - start PHP 10/1  - discharge 9/30 with one week supply of medications                                                                    noia)  4. medical  -chronic hyponatremia: Na low 131, on fluid restriction < 1.5L   - hypothyroidism: levothyroxine 50mcg  - HLD: lipitor 40mg QHS  - HTN: labetalol 200mg BID, amlodipine 10mg   - DM2: metformin 500mg BID  - MRSA: s/p scalp I/D on 9/8 with recommendations from C/L   	- c/w clindamycin 450mg PO every 8 hours x 5 days (started 9/10/23)  	- wound consult ordered at [facility] 9/11, recommended bacitracin ointment QD x5 days (started 9/12/24)  - CVID: monthly IVIG (typically Gammagard S/D formula); last dose given 9/9/23 (follows with Dr. Mitchell Boxer)  5. encourage I/G/M therapy  6. dispo  - wound care: [facility]: [phone]. Address: [address] [place] [address]  - psychiatry: Dr Hollie Cook with [facility]   59 yo male w PMHx of schizoaffective disorder (bipolar type with multiple [facility] admissions), HTN, HLD, hypothyroidism, CVID/hypogammaglobulinemia (monthly IVIG, last on 7/23 or 9/9), hx of frequent skin infection (MRSA, abscess/cellulitis w MRSA bacteremia s/p debridement 6/2023) presented to the ED with sisters after appearing unwell admitted for concern for suicide attempt. Psych c/s for SI.     Treatment Plan  1. admitted to unit, legal status   2. safety  - routine checks as pt denies SIIP/HIIP and is able to contract for safety  - haldol and ativan PO/IM PRN for agitation and anxiety  3. psychiatric  - c/w haldol 5mg for psychosis  - haldol dec 200mg Q3w last given 9/18, next due 10/9  - c/w lexapro 10mg  - past trials: abilify (worsening paranoia)  4. medical  -chronic hyponatremia: Na low 131, on fluid restriction < 1.5L   - hypothyroidism: levothyroxine 50mcg  - HLD: lipitor 40mg QHS  - HTN: labetalol 200mg BID, amlodipine 10mg   - DM2: metformin 500mg BID  - MRSA: s/p scalp I/D on 9/8 with recommendations from C/L   	- c/w clindamycin 450mg PO every 8 hours x 5 days (started 9/10/23)  	- wound consult ordered at [facility] 9/11, recommended bacitracin ointment QD x5 days (started 9/12/24)  - CVID: monthly IVIG (typically Gammagard S/D formula); last dose given 9/9/24, next due 10/7 (follows with Dr. Mitchell Boxer)  5. encourage I/G/M therapy  6. dispo  - outpatient treatment team meeting Thursday 9/26 at 12:30  - wound care: [facility]: [phone]. Address: [address] [place] [address]  - psychiatry: Dr Hollie Cook with [facility]  - start PHP 10/1  - discharge 9/30 with one week supply of medications                                                                    noia)  4. medical  -chronic hyponatremia: Na low 131, on fluid restriction < 1.5L   - hypothyroidism: levothyroxine 50mcg  - HLD: lipitor 40mg QHS  - HTN: labetalol 200mg BID, amlodipine 10mg   - DM2: metformin 500mg BID  - MRSA: s/p scalp I/D on 9/8 with recommendations from C/L   	- c/w clindamycin 450mg PO every 8 hours x 5 days (started 9/10/23)  	- wound consult ordered at [facility] 9/11, recommended bacitracin ointment QD x5 days (started 9/12/24)  - CVID: monthly IVIG (typically Gammagard S/D formula); last dose given 9/9/23 (follows with Dr. Mitchell Boxer)  5. encourage I/G/M therapy  6. dispo  - wound care: [facility]: [phone]. Address: [address] [place] [address]  - psychiatry: Dr Hollie Cook with [facility]

## 2024-09-26 NOTE — BH PSYCHOLOGY - GROUP THERAPY NOTE - NSPSYCHOLGRPCOGPROB_PSY_A_CORE FT
Anxiety, Depression, Emotion dysregulation, Lack of coping skills 
No

## 2024-09-26 NOTE — BH INPATIENT PSYCHIATRY PROGRESS NOTE - NSBHMETABOLIC_PSY_ALL_CORE_FT
BMI: BMI (kg/m2): 30.4 (09-11-24 @ 14:40)  HbA1c: A1C with Estimated Average Glucose Result: 5.4 % (09-12-24 @ 10:48)    Glucose: POCT Blood Glucose.: 108 mg/dL (09-26-24 @ 07:25)    BP: --Vital Signs Last 24 Hrs  T(C): 36.6 (09-26-24 @ 09:00), Max: 36.6 (09-26-24 @ 09:00)  T(F): 97.8 (09-26-24 @ 09:00), Max: 97.8 (09-26-24 @ 09:00)  HR: --  BP: --  BP(mean): --  RR: 18 (09-26-24 @ 09:00) (18 - 18)  SpO2: --    Orthostatic VS  09-26-24 @ 09:00  Lying BP: --/-- HR: --  Sitting BP: 130/71 HR: 60  Standing BP: 121/62 HR: 72  Site: --  Mode: --  Orthostatic VS  09-25-24 @ 19:09  Lying BP: --/-- HR: --  Sitting BP: 149/84 HR: 64  Standing BP: 113/63 HR: 68  Site: --  Mode: --  Orthostatic VS  09-24-24 @ 19:41  Lying BP: --/-- HR: --  Sitting BP: 126/67 HR: 61  Standing BP: 109/63 HR: 76  Site: --  Mode: --    Lipid Panel: Date/Time: 09-12-24 @ 10:48  Cholesterol, Serum: 74  LDL Cholesterol Calculated: 33  HDL Cholesterol, Serum: 28  Total Cholesterol/HDL Ration Measurement: --  Triglycerides, Serum: 67   BMI: BMI (kg/m2): 30.4 (09-11-24 @ 14:40)  HbA1c: A1C with Estimated Average Glucose Result: 5.4 % (09-12-24 @ 10:48)    Glucose: POCT Blood Glucose.: 103 mg/dL (09-27-24 @ 07:27)    BP: --Vital Signs Last 24 Hrs  T(C): 36.7 (09-27-24 @ 08:43), Max: 36.7 (09-27-24 @ 08:43)  T(F): 98 (09-27-24 @ 08:43), Max: 98 (09-27-24 @ 08:43)  HR: --  BP: --  BP(mean): --  RR: --  SpO2: --    Orthostatic VS  09-27-24 @ 08:43  Lying BP: --/-- HR: --  Sitting BP: 138/66 HR: 64  Standing BP: 125/63 HR: 74  Site: --  Mode: --  Orthostatic VS  09-26-24 @ 19:25  Lying BP: --/-- HR: --  Sitting BP: 126/70 HR: 62  Standing BP: 110/62 HR: 69  Site: --  Mode: --  Orthostatic VS  09-26-24 @ 09:00  Lying BP: --/-- HR: --  Sitting BP: 130/71 HR: 60  Standing BP: 121/62 HR: 72  Site: --  Mode: --  Orthostatic VS  09-25-24 @ 19:09  Lying BP: --/-- HR: --  Sitting BP: 149/84 HR: 64  Standing BP: 113/63 HR: 68  Site: --  Mode: --    Lipid Panel: Date/Time: 09-12-24 @ 10:48  Cholesterol, Serum: 74  LDL Cholesterol Calculated: 33  HDL Cholesterol, Serum: 28  Total Cholesterol/HDL Ration Measurement: --  Triglycerides, Serum: 67

## 2024-09-26 NOTE — BH PSYCHOLOGY - GROUP THERAPY NOTE - NSBHPSYCHOLRESPCOMMENT_PSY_A_CORE FT
The patient appeared adequately groomed and casually dressed. Pt was fully engaged in the group as evidenced by his willingness to verbally communicate independently during check-in and discussion. Pt normalized emotions depicted on illustration, providing encouragement to peers and asking relevant questions. Pt highlighted the theme of acceptance. Speech WNL. PT was oriented X3. Pt was appropriate with peers. 
The patient appeared adequately groomed and casually dressed. Pt was fully engaged in the group as evidenced by his willingness to verbally communicate independently during check-in and discussion. Pt identified activities to engage in on the unit that help him to “accept difficult feelings.” Pt also shared his value of being kind to others, which has been a significant part of his identity for “as long as [he] can remember.” Speech WNL. PT was oriented X3. Pt was appropriate with peers. 
The patient appeared adequately groomed and casually dressed. Pt appeared engaged in the group as evidenced by his willingness to independently participate during the discussion. Pt participated in group check-in and mindful eating activity. Pt engaged well in discussion about utilizing distractions as a means to temporarily cope with unpleasant thoughts/emotions. Pt shared while he is home he enjoys listening to the radio and finds the voice on talk radio stations “calming.” Pt identified playing cards and walking as two things he could do to distract/cope while on the unit. Speech was WNL. PT was oriented X3. Pt was appropriate and supportive with peers. Speech WNL. PT was oriented X3. Pt was appropriate and supportive with peers. 
The patient appeared adequately groomed and casually dressed. Pt was fully engaged in the group as evidenced by his willingness to verbally communicate independently during check-in and discussion. During check-in, Pt shared he values people with morals. Pt engaged well in mindful stretching activity and provided feedback. Pt engaged well in discussion about anger, sharing he is no longer as quick to anger as when he was younger. Pt highlighted this may be due to an increase self-awareness of his own emotions. Speech WNL. PT was oriented X3. Pt was appropriate with peers.

## 2024-09-26 NOTE — BH INPATIENT PSYCHIATRY PROGRESS NOTE - NSBHCHARTREVIEWVS_PSY_A_CORE FT
Vital Signs Last 24 Hrs  T(C): 36.6 (09-26-24 @ 09:00), Max: 36.6 (09-26-24 @ 09:00)  T(F): 97.8 (09-26-24 @ 09:00), Max: 97.8 (09-26-24 @ 09:00)  HR: --  BP: --  BP(mean): --  RR: 18 (09-26-24 @ 09:00) (18 - 18)  SpO2: --    Orthostatic VS  09-26-24 @ 09:00  Lying BP: --/-- HR: --  Sitting BP: 130/71 HR: 60  Standing BP: 121/62 HR: 72  Site: --  Mode: --  Orthostatic VS  09-25-24 @ 19:09  Lying BP: --/-- HR: --  Sitting BP: 149/84 HR: 64  Standing BP: 113/63 HR: 68  Site: --  Mode: --  Orthostatic VS  09-24-24 @ 19:41  Lying BP: --/-- HR: --  Sitting BP: 126/67 HR: 61  Standing BP: 109/63 HR: 76  Site: --  Mode: --   Vital Signs Last 24 Hrs  T(C): 36.7 (09-27-24 @ 08:43), Max: 36.7 (09-27-24 @ 08:43)  T(F): 98 (09-27-24 @ 08:43), Max: 98 (09-27-24 @ 08:43)  HR: --  BP: --  BP(mean): --  RR: --  SpO2: --    Orthostatic VS  09-27-24 @ 08:43  Lying BP: --/-- HR: --  Sitting BP: 138/66 HR: 64  Standing BP: 125/63 HR: 74  Site: --  Mode: --  Orthostatic VS  09-26-24 @ 19:25  Lying BP: --/-- HR: --  Sitting BP: 126/70 HR: 62  Standing BP: 110/62 HR: 69  Site: --  Mode: --  Orthostatic VS  09-26-24 @ 09:00  Lying BP: --/-- HR: --  Sitting BP: 130/71 HR: 60  Standing BP: 121/62 HR: 72  Site: --  Mode: --  Orthostatic VS  09-25-24 @ 19:09  Lying BP: --/-- HR: --  Sitting BP: 149/84 HR: 64  Standing BP: 113/63 HR: 68  Site: --  Mode: --
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
## 2024-09-26 NOTE — BH PSYCHOLOGY - GROUP THERAPY NOTE - NSPSYCHOLGRPBILLING_PSY_A_CORE
[id number] - Group Psychotherapy
[id number] - Group Psychotherapy
[id number] - Group Psychotherapy
[id number] - Group Psychotherapy

## 2024-09-27 LAB
ANION GAP SERPL CALC-SCNC: 11 MMOL/L — SIGNIFICANT CHANGE UP (ref 7–14)
BASOPHILS # BLD AUTO: 0.02 K/UL — SIGNIFICANT CHANGE UP (ref 0–0.2)
BASOPHILS NFR BLD AUTO: 0.6 % — SIGNIFICANT CHANGE UP (ref 0–2)
BUN SERPL-MCNC: 13 MG/DL — SIGNIFICANT CHANGE UP (ref 7–23)
CALCIUM SERPL-MCNC: 9.3 MG/DL — SIGNIFICANT CHANGE UP (ref 8.4–10.5)
CHLORIDE SERPL-SCNC: 94 MMOL/L — LOW (ref 98–107)
CO2 SERPL-SCNC: 23 MMOL/L — SIGNIFICANT CHANGE UP (ref 22–31)
CREAT SERPL-MCNC: 1.05 MG/DL — SIGNIFICANT CHANGE UP (ref 0.5–1.3)
EGFR: 83 ML/MIN/1.73M2 — SIGNIFICANT CHANGE UP
EOSINOPHIL # BLD AUTO: 0.2 K/UL — SIGNIFICANT CHANGE UP (ref 0–0.5)
EOSINOPHIL NFR BLD AUTO: 5.6 % — SIGNIFICANT CHANGE UP (ref 0–6)
GLUCOSE BLDC GLUCOMTR-MCNC: 103 MG/DL — HIGH (ref 70–99)
GLUCOSE BLDC GLUCOMTR-MCNC: 104 MG/DL — HIGH (ref 70–99)
GLUCOSE SERPL-MCNC: 131 MG/DL — HIGH (ref 70–99)
HCT VFR BLD CALC: 31.4 % — LOW (ref 39–50)
HGB BLD-MCNC: 10.9 G/DL — LOW (ref 13–17)
IANC: 2.2 K/UL — SIGNIFICANT CHANGE UP (ref 1.8–7.4)
IMM GRANULOCYTES NFR BLD AUTO: 0 % — SIGNIFICANT CHANGE UP (ref 0–0.9)
LYMPHOCYTES # BLD AUTO: 0.76 K/UL — LOW (ref 1–3.3)
LYMPHOCYTES # BLD AUTO: 21.4 % — SIGNIFICANT CHANGE UP (ref 13–44)
MAGNESIUM SERPL-MCNC: 1.8 MG/DL — SIGNIFICANT CHANGE UP (ref 1.6–2.6)
MCHC RBC-ENTMCNC: 28.7 PG — SIGNIFICANT CHANGE UP (ref 27–34)
MCHC RBC-ENTMCNC: 34.7 GM/DL — SIGNIFICANT CHANGE UP (ref 32–36)
MCV RBC AUTO: 82.6 FL — SIGNIFICANT CHANGE UP (ref 80–100)
MONOCYTES # BLD AUTO: 0.37 K/UL — SIGNIFICANT CHANGE UP (ref 0–0.9)
MONOCYTES NFR BLD AUTO: 10.4 % — SIGNIFICANT CHANGE UP (ref 2–14)
NEUTROPHILS # BLD AUTO: 2.2 K/UL — SIGNIFICANT CHANGE UP (ref 1.8–7.4)
NEUTROPHILS NFR BLD AUTO: 62 % — SIGNIFICANT CHANGE UP (ref 43–77)
NRBC # BLD: 0 /100 WBCS — SIGNIFICANT CHANGE UP (ref 0–0)
NRBC # FLD: 0 K/UL — SIGNIFICANT CHANGE UP (ref 0–0)
PHOSPHATE SERPL-MCNC: 3.4 MG/DL — SIGNIFICANT CHANGE UP (ref 2.5–4.5)
PLATELET # BLD AUTO: 177 K/UL — SIGNIFICANT CHANGE UP (ref 150–400)
POTASSIUM SERPL-MCNC: 4.5 MMOL/L — SIGNIFICANT CHANGE UP (ref 3.5–5.3)
POTASSIUM SERPL-SCNC: 4.5 MMOL/L — SIGNIFICANT CHANGE UP (ref 3.5–5.3)
RBC # BLD: 3.8 M/UL — LOW (ref 4.2–5.8)
RBC # FLD: 14 % — SIGNIFICANT CHANGE UP (ref 10.3–14.5)
SODIUM SERPL-SCNC: 128 MMOL/L — LOW (ref 135–145)
WBC # BLD: 3.55 K/UL — LOW (ref 3.8–10.5)
WBC # FLD AUTO: 3.55 K/UL — LOW (ref 3.8–10.5)

## 2024-09-27 PROCEDURE — 99232 SBSQ HOSP IP/OBS MODERATE 35: CPT | Mod: FS

## 2024-09-27 RX ORDER — METFORMIN HCL 500 MG
1 TABLET ORAL
Qty: 14 | Refills: 0
Start: 2024-09-27 | End: 2024-10-03

## 2024-09-27 RX ORDER — TAMSULOSIN HCL 0.4 MG
1 CAPSULE ORAL
Qty: 7 | Refills: 0
Start: 2024-09-27 | End: 2024-10-03

## 2024-09-27 RX ORDER — BENZTROPINE MESYLATE 2 MG
1 TABLET ORAL
Qty: 7 | Refills: 0
Start: 2024-09-27 | End: 2024-10-03

## 2024-09-27 RX ORDER — ATORVASTATIN CALCIUM 10 MG/1
1 TABLET, FILM COATED ORAL
Qty: 7 | Refills: 0
Start: 2024-09-27 | End: 2024-10-03

## 2024-09-27 RX ORDER — LABETALOL HYDROCHLORIDE 200 MG/1
1 TABLET, FILM COATED ORAL
Qty: 14 | Refills: 0
Start: 2024-09-27 | End: 2024-10-03

## 2024-09-27 RX ORDER — ESCITALOPRAM OXALATE 10 MG
1 TABLET ORAL
Qty: 7 | Refills: 0
Start: 2024-09-27 | End: 2024-10-03

## 2024-09-27 RX ORDER — AMLODIPINE BESYLATE 5 MG
1 TABLET ORAL
Qty: 7 | Refills: 0
Start: 2024-09-27 | End: 2024-10-03

## 2024-09-27 RX ADMIN — Medication 10 MILLIGRAM(S): at 08:50

## 2024-09-27 RX ADMIN — Medication 17 GRAM(S): at 08:50

## 2024-09-27 RX ADMIN — LABETALOL HYDROCHLORIDE 200 MILLIGRAM(S): 200 TABLET, FILM COATED ORAL at 08:50

## 2024-09-27 RX ADMIN — ATORVASTATIN CALCIUM 40 MILLIGRAM(S): 10 TABLET, FILM COATED ORAL at 20:39

## 2024-09-27 RX ADMIN — Medication 0.4 MILLIGRAM(S): at 20:39

## 2024-09-27 RX ADMIN — Medication 3 MILLIGRAM(S): at 20:39

## 2024-09-27 RX ADMIN — Medication 500 MILLIGRAM(S): at 20:39

## 2024-09-27 RX ADMIN — Medication 500 MILLIGRAM(S): at 08:50

## 2024-09-27 RX ADMIN — LABETALOL HYDROCHLORIDE 200 MILLIGRAM(S): 200 TABLET, FILM COATED ORAL at 20:39

## 2024-09-27 RX ADMIN — Medication 50 MICROGRAM(S): at 06:37

## 2024-09-27 RX ADMIN — Medication 1 MILLIGRAM(S): at 20:39

## 2024-09-27 RX ADMIN — Medication 2 TABLET(S): at 20:39

## 2024-09-27 NOTE — BH INPATIENT PSYCHIATRY PROGRESS NOTE - NSBHATTESTTYPEVISIT_PSY_A_CORE
On-site Attending supervising ANA (99XXX codes)
Attending Only
On-site Attending supervising ANA (99XXX codes)
ANA without on-site Attending supervision
On-site Attending supervising ANA (99XXX codes)
ANA without on-site Attending supervision
On-site Attending supervising ANA (99XXX codes)
ANA without on-site Attending supervision
On-site Attending supervising ANA (99XXX codes)
On-site Attending supervising ANA (99XXX codes)

## 2024-09-27 NOTE — BH INPATIENT PSYCHIATRY PROGRESS NOTE - NSBHMSETHTCONTENT_PSY_A_CORE
Delusions/Suicidality
Delusions/Other
Delusions/Other
Unremarkable/Delusions/Other
Delusions/Other
Delusions/Suicidality
Delusions/Suicidality
Unremarkable/Delusions/Other
Delusions/Other
Delusions/Other
Unremarkable/Delusions/Other
Delusions/Other

## 2024-09-27 NOTE — BH INPATIENT PSYCHIATRY PROGRESS NOTE - NSTXPSYCHODATETRGT_PSY_ALL_CORE
18-Sep-2024
18-Sep-2024
25-Sep-2024
18-Sep-2024
25-Sep-2024
18-Sep-2024
02-Oct-2024
18-Sep-2024
25-Sep-2024
23-Sep-2024
02-Oct-2024
18-Sep-2024
02-Oct-2024
25-Sep-2024
30-Sep-2024

## 2024-09-27 NOTE — BH INPATIENT PSYCHIATRY PROGRESS NOTE - NSBHMSEAFFQUAL_PSY_A_CORE
Euthymic/Anxious
Euthymic
Euthymic
Depressed/Anxious
Depressed/Anxious
Euthymic
Euthymic
Depressed/Anxious
Euthymic
Euthymic/Anxious
Euthymic

## 2024-09-27 NOTE — BH INPATIENT PSYCHIATRY PROGRESS NOTE - NSTXDCOTHRGOAL_PSY_ALL_CORE
Pt will show a reduction of symptoms and engage meaningfully with writer to identify a safe discharge plan. Pt will comply with recommended tx plan and medications for 5 days, identify 2 benefits for adhering to tx.

## 2024-09-27 NOTE — BH INPATIENT PSYCHIATRY PROGRESS NOTE - NSBHPSYCHOLCOGORIENT_PSY_A_CORE
No
Oriented to time, place, person, situation

## 2024-09-27 NOTE — BH INPATIENT PSYCHIATRY PROGRESS NOTE - NSICDXBHPRIMARYDX_PSY_ALL_CORE
Schizoaffective disorder, bipolar type   F25.0  

## 2024-09-27 NOTE — BH PSYCHOLOGY - CLINICIAN PSYCHOTHERAPY NOTE - NSBHPSYCHOLINT_PSY_A_CORE
Cognitive/behavioral therapy/Dynamic issues addressed/Reality testing/Supported coping skills/Supportive therapy/other...
Cognitive/behavioral therapy/Dynamic issues addressed/Reality testing/Supported coping skills/Supportive therapy/other...

## 2024-09-27 NOTE — BH INPATIENT PSYCHIATRY PROGRESS NOTE - NSTXDEPRESDATEEST_PSY_ALL_CORE
11-Sep-2024
11-Sep-2024
25-Sep-2024
11-Sep-2024
18-Sep-2024
11-Sep-2024
18-Sep-2024
18-Sep-2024
25-Sep-2024
11-Sep-2024
18-Sep-2024
11-Sep-2024
25-Sep-2024
11-Sep-2024
18-Sep-2024

## 2024-09-27 NOTE — BH INPATIENT PSYCHIATRY PROGRESS NOTE - NSBHCHARTREVIEWVS_PSY_A_CORE FT
Vital Signs Last 24 Hrs  T(C): 36.7 (09-27-24 @ 08:43), Max: 36.7 (09-27-24 @ 08:43)  T(F): 98 (09-27-24 @ 08:43), Max: 98 (09-27-24 @ 08:43)  HR: --  BP: --  BP(mean): --  RR: --  SpO2: --    Orthostatic VS  09-27-24 @ 08:43  Lying BP: --/-- HR: --  Sitting BP: 138/66 HR: 64  Standing BP: 125/63 HR: 74  Site: --  Mode: --  Orthostatic VS  09-26-24 @ 19:25  Lying BP: --/-- HR: --  Sitting BP: 126/70 HR: 62  Standing BP: 110/62 HR: 69  Site: --  Mode: --  Orthostatic VS  09-26-24 @ 09:00  Lying BP: --/-- HR: --  Sitting BP: 130/71 HR: 60  Standing BP: 121/62 HR: 72  Site: --  Mode: --  Orthostatic VS  09-25-24 @ 19:09  Lying BP: --/-- HR: --  Sitting BP: 149/84 HR: 64  Standing BP: 113/63 HR: 68  Site: --  Mode: --

## 2024-09-27 NOTE — BH INPATIENT PSYCHIATRY PROGRESS NOTE - NSTXHYPERGOAL_PSY_ALL_CORE
Be able to self-monitor blood pressure using a BP machine with demonstration of the technique to the RN
Will identify 1 modifiable risk factor and a strategy to improve this
Be able to self-monitor blood pressure using a BP machine with demonstration of the technique to the RN
Be able to self-monitor blood pressure using a BP machine with demonstration of the technique to the RN
Will identify 1 modifiable risk factor and a strategy to improve this

## 2024-09-27 NOTE — BH INPATIENT PSYCHIATRY PROGRESS NOTE - NSBHMSEMOOD_PSY_A_CORE
Depressed/Anxious
Normal
Depressed/Anxious
Normal
Depressed/Anxious
Normal

## 2024-09-27 NOTE — BH INPATIENT PSYCHIATRY PROGRESS NOTE - NSTXDEPRESINTERMD_PSY_ALL_CORE
psychopharmacology  continue lexapro

## 2024-09-27 NOTE — BH INPATIENT PSYCHIATRY PROGRESS NOTE - NSTXHYPERDATETRGT_PSY_ALL_CORE
02-Oct-2024
02-Oct-2024
25-Sep-2024
02-Oct-2024
25-Sep-2024

## 2024-09-27 NOTE — BH INPATIENT PSYCHIATRY PROGRESS NOTE - NSBHMSEGAIT_PSY_A_CORE
Normal gait / station
Unable to assess
Unable to assess
Normal gait / station
Normal gait / station
Unable to assess
Normal gait / station

## 2024-09-27 NOTE — BH INPATIENT PSYCHIATRY PROGRESS NOTE - NSBHMSETHTPROC_PSY_A_CORE
Perseverative/Impaired reasoning
Impaired reasoning
Perseverative/Impaired reasoning
Impaired reasoning
Perseverative/Impaired reasoning
Impaired reasoning
Perseverative/Impaired reasoning
Impaired reasoning
Perseverative/Impaired reasoning
Impaired reasoning
Impaired reasoning

## 2024-09-27 NOTE — BH INPATIENT PSYCHIATRY PROGRESS NOTE - NSTXHYPERDATEEST_PSY_ALL_CORE
18-Sep-2024
25-Sep-2024
18-Sep-2024
18-Sep-2024
25-Sep-2024
18-Sep-2024
18-Sep-2024
25-Sep-2024
18-Sep-2024

## 2024-09-27 NOTE — BH INPATIENT PSYCHIATRY PROGRESS NOTE - ATTENDING COMMENTS
Chart was reviewed and case discussed with the Psych NP. I agree with the assessment and plan as documented in the Psych NP's progress note and was directly involved in medical decision making.  

## 2024-09-27 NOTE — BH INPATIENT PSYCHIATRY PROGRESS NOTE - NSBHMETABOLIC_PSY_ALL_CORE_FT
BMI: BMI (kg/m2): 30.4 (09-11-24 @ 14:40)  HbA1c: A1C with Estimated Average Glucose Result: 5.4 % (09-12-24 @ 10:48)    Glucose: POCT Blood Glucose.: 103 mg/dL (09-27-24 @ 07:27)    BP: --Vital Signs Last 24 Hrs  T(C): 36.7 (09-27-24 @ 08:43), Max: 36.7 (09-27-24 @ 08:43)  T(F): 98 (09-27-24 @ 08:43), Max: 98 (09-27-24 @ 08:43)  HR: --  BP: --  BP(mean): --  RR: --  SpO2: --    Orthostatic VS  09-27-24 @ 08:43  Lying BP: --/-- HR: --  Sitting BP: 138/66 HR: 64  Standing BP: 125/63 HR: 74  Site: --  Mode: --  Orthostatic VS  09-26-24 @ 19:25  Lying BP: --/-- HR: --  Sitting BP: 126/70 HR: 62  Standing BP: 110/62 HR: 69  Site: --  Mode: --  Orthostatic VS  09-26-24 @ 09:00  Lying BP: --/-- HR: --  Sitting BP: 130/71 HR: 60  Standing BP: 121/62 HR: 72  Site: --  Mode: --  Orthostatic VS  09-25-24 @ 19:09  Lying BP: --/-- HR: --  Sitting BP: 149/84 HR: 64  Standing BP: 113/63 HR: 68  Site: --  Mode: --    Lipid Panel: Date/Time: 09-12-24 @ 10:48  Cholesterol, Serum: 74  LDL Cholesterol Calculated: 33  HDL Cholesterol, Serum: 28  Total Cholesterol/HDL Ration Measurement: --  Triglycerides, Serum: 67

## 2024-09-27 NOTE — BH INPATIENT PSYCHIATRY PROGRESS NOTE - NSBHMSETHTASSOC_PSY_A_CORE
Loose
Loose
Normal
Loose
Normal
Normal

## 2024-09-27 NOTE — BH PSYCHOLOGY - CLINICIAN PSYCHOTHERAPY NOTE - NSBHPSYCHOLGOALS_PSY_A_CORE
Decrease symptoms/Improve social/vocational/coping skills
Decrease symptoms/Improve social/vocational/coping skills/Psychoeducation

## 2024-09-27 NOTE — BH INPATIENT PSYCHIATRY PROGRESS NOTE - NSBHFUPINTERVALCCFT_PSY_A_CORE
Pt seen for f/u for chronic SI and paranoia. 
Pt seen for f/u for chronic SI and paranoia. 
reported feeling "alright"
Pt seen for f/u for chronic SI and paranoia. 
reported feeling "alright"
Pt seen for f/u for chronic SI and paranoia. 
reported feeling "alright"

## 2024-09-27 NOTE — BH INPATIENT PSYCHIATRY PROGRESS NOTE - NSBHASSESSSUMMFT_PSY_ALL_CORE
57 yo male w PMHx of schizoaffective disorder (bipolar type with multiple [facility] admissions), HTN, HLD, hypothyroidism, CVID/hypogammaglobulinemia (monthly IVIG, last on 7/23 or 9/9), hx of frequent skin infection (MRSA, abscess/cellulitis w MRSA bacteremia s/p debridement 6/2023) presented to the ED with sisters after appearing unwell admitted for concern for suicide attempt. Psych c/s for SI.     Treatment Plan  1. admitted to unit, legal status   2. safety  - routine checks as pt denies SIIP/HIIP and is able to contract for safety  - haldol and ativan PO/IM PRN for agitation and anxiety  3. psychiatric  - c/w haldol 5mg for psychosis  - haldol dec 200mg Q3w last given 9/18, next due 10/16  - c/w lexapro 10mg  - past trials: abilify (worsening paranoia)  4. medical  -chronic hyponatremia: Na low 131, on fluid restriction < 1.5L   - hypothyroidism: levothyroxine 50mcg  - HLD: lipitor 40mg QHS  - HTN: labetalol 200mg BID, amlodipine 10mg   - DM2: metformin 500mg BID  - MRSA: s/p scalp I/D on 9/8 with recommendations from C/[facility]   	- c/w clindamycin 450mg PO every 8 hours x 5 days (started 9/10/23)  	- wound consult ordered at [facility] 9/11, recommended bacitracin ointment QD x5 days (started 9/12/24)  - CVID: monthly IVIG (typically Gammagard S/D formula); last dose given 9/9/24, next due 10/7 (follows with Dr. Mitchell Boxer)  5. encourage I/G/M therapy  6. dispo  - outpatient treatment team meeting Thursday 9/26 at 12:30  - wound care: [facility]: [phone]. Address: [address] [place] [address]  - psychiatry: Dr Hollie Cook with [facility]  - start PHP 10/1  - discharge 9/30 with one week supply of medications                                                                    noia)  4. medical  -chronic hyponatremia: Na low 131, on fluid restriction < 1.5L   - hypothyroidism: levothyroxine 50mcg  - HLD: lipitor 40mg QHS  - HTN: labetalol 200mg BID, amlodipine 10mg   - DM2: metformin 500mg BID  - MRSA: s/p scalp I/D on 9/8 with recommendations from C/L   	- c/w clindamycin 450mg PO every 8 hours x 5 days (started 9/10/23)  	- wound consult ordered at [facility] 9/11, recommended bacitracin ointment QD x5 days (started 9/12/24)  - CVID: monthly IVIG (typically Gammagard S/D formula); last dose given 9/9/23 (follows with Dr. Mitchell Boxer)  5. encourage I/G/M therapy  6. dispo  - wound care: [facility]: [phone]. Address: [address] [place] [address]  - psychiatry: Dr Hollie Cook with [facility]

## 2024-09-27 NOTE — BH INPATIENT PSYCHIATRY PROGRESS NOTE - NSBHFUPINTERVALHXFT_PSY_A_CORE
Patient is followed up for psychosis/mood. Chart, medications and labs reviewed. Patient is discussed during morning brief, no overnight events, has been in good behavioral control.   Patient was seen and is evaluated on the unit. He reports having good sleep overnight, appetite remains well. He states that he is doing "good", was able to have family meeting yesterday 9/26. He does not offer any concerns on interview and looks forward to discharging Monday. He has been compliant with standing medications, denies SE. He denies SIIP. Denies any AVH. Was able to have labs drawn today, remains hyponatremic (Na - 128). He remains on fluid restriction, 1500mL. No acute medical concerns, VSS.

## 2024-09-27 NOTE — BH INPATIENT PSYCHIATRY PROGRESS NOTE - NSTXDCOTHRDATEEST_PSY_ALL_CORE
26-Sep-2024
19-Sep-2024
12-Sep-2024
19-Sep-2024
19-Sep-2024
12-Sep-2024
12-Sep-2024
19-Sep-2024
12-Sep-2024

## 2024-09-27 NOTE — BH INPATIENT PSYCHIATRY PROGRESS NOTE - NSTXDEPRESDATETRGT_PSY_ALL_CORE
25-Sep-2024
02-Oct-2024
02-Oct-2024
25-Sep-2024
02-Oct-2024
25-Sep-2024

## 2024-09-27 NOTE — BH INPATIENT PSYCHIATRY PROGRESS NOTE - NSTXPSYCHODATEEST_PSY_ALL_CORE
11-Sep-2024
16-Sep-2024
18-Sep-2024
18-Sep-2024
11-Sep-2024
18-Sep-2024
25-Sep-2024
25-Sep-2024
18-Sep-2024
11-Sep-2024
25-Sep-2024
11-Sep-2024
11-Sep-2024

## 2024-09-27 NOTE — BH INPATIENT PSYCHIATRY PROGRESS NOTE - NSTXPROBDCOTHR_PSY_ALL_CORE
DISCHARGE ISSUE - OTHER

## 2024-09-27 NOTE — BH INPATIENT PSYCHIATRY PROGRESS NOTE - NSTXDCOTHRDATETRGT_PSY_ALL_CORE
19-Sep-2024
03-Oct-2024
19-Sep-2024

## 2024-09-27 NOTE — BH INPATIENT PSYCHIATRY PROGRESS NOTE - NSBHMSEBEHAV_PSY_A_CORE
Cooperative/Other

## 2024-09-27 NOTE — BH INPATIENT PSYCHIATRY PROGRESS NOTE - NSTXPSYCHOPROGRES_PSY_ALL_CORE
Improving
Improving
No Change
Improving
No Change
Improving
No Change
Improving

## 2024-09-27 NOTE — BH PSYCHOLOGY - CLINICIAN PSYCHOTHERAPY NOTE - NSBHPSYCHOLNARRATIVE_PSY_A_CORE FT
Writer met with Pt for a 20-minute individual therapy session. Pt was alert and presented with adequate hygiene and grooming. Pt reported that he was feeling “good.”  Pt appeared neutral throughout the session. Pt displayed congruent affect with reported mood. Pt eye contact was good. Pt denied experiencing any A/V hallucinations. His thought process was linear, and goal directed. Pts’ speech was WNL, and content was relevant to discussion. Pt did not endorse any SI/HI, plans, or intent. Oriented x3. Pt displayed fair insight and judgement.        Session focused on discussing Pt’s symptoms, assessing for risk, exploring coping skills/protective factors, and establishing therapeutic goals. Pt reported feeling “good” and provided insights on what led to his current inpatient hospitalization. Pt shared he was experiencing "paranoid delusions" about people/law enforcement being after him for hurting a child. Pt stated he knows there is "no truth" to these thoughts, but prior to his admission, was getting increasingly distressed about them. Pt provided relevant mental health and trauma hx. Pt expressed a desire to learn coping skills in individual therapy as well as discuss how he could go about living a life according to his values. Pt shared that he has been attending groups while on the unit, which help him "tolerate" his negative thoughts more effectively. Pt reported he was experiencing the same paranoid delusions the other day on the unit and rather than requesting a prn, Pt shared he was able to "sit with [his] thoughts and emotions until they passed." Writer provided Pt with support, validation, encouragement, and a safe space to share his thoughts and feelings.   
Writer met with Pt for a 30-minute individual therapy session. Pt was alert and presented with adequate hygiene and grooming. Pt reported that he was feeling “okay.”  Pt appeared neutral throughout the session. Pt displayed congruent affect with reported mood. Pt eye contact was good. Pt denied experiencing any A/V hallucinations. His thought process was linear, and goal directed. Pts’ speech was WNL, and content was relevant to discussion. Pt did not endorse any SI/HI, plans, or intent. Oriented x3. Pt displayed fair insight and judgement.        Session focused on checking-in with Pt, introducing "STOP" coping strategy, identifying other coping "steps" Pt could take when experiencing paranoid delusions, and discussing discharge. Patient shared he was doing "okay" and feels he is more involved in group therapy "this time on the unit than [he] was before." Pt believes due to his increased involvement, he is handling discomfort and distressing thoughts more effectively than he did before. Writer introduced "STOP" skill and Pt recognized he could use this skill when in public settings experiencing distressing thought content. Writer and Pt discussed additional coping strategies Pt could utilize when he notices his thoughts (i.e., "just acknowledge them without forcing them to change;" "telling [himself] the thoughts will pass;" "asking for support from [his] treatment team and sisters;" "talking to his therapist once at [facility]"). Patient shared his meeting with his treatment team and his sisters "didn't go so well." Pt believes his sisters and treatment team want "two very different things for [him]." Writer encouraged Pt to identify the pros/cons to being discharged to a higher level of care. Patient was superficially cooperative but stated "[he] knows the treatment team is coming from a place of concern and care." When asked about the pros/cons to remaining in his apartment, Pt reiterated "[his] sisters want [him] to remain in the apartment" and that "[he] deserves one more chance to prove [he] can live on [his] own." Writer reiterated to Pt the identified pros of living in a more supportive housing setting and how that aligns with the Pt's values (i.e., remaining healthy, having support, increased socialization). Patient stated he would think about "where both sides are coming from" over the weekend. Writer provided Pt with support, validation, encouragement, and a safe space to share his thoughts and feelings.

## 2024-09-27 NOTE — BH PSYCHOLOGY - CLINICIAN PSYCHOTHERAPY NOTE - NSTXPSYCHOGOAL_PSY_ALL_CORE
Will be able to report experiencing hallucinations to staff
Will identify 2 coping skills that assist with focus on reality

## 2024-09-27 NOTE — BH INPATIENT PSYCHIATRY PROGRESS NOTE - NSTXDEPRESGOAL_PSY_ALL_CORE
Exhibit improvements in self-grooming, hygiene, sleep and appetite
Report using a coping skill to overcome sadness and worry in order to socialize with peers daily
Will identify 2 coping skills that assist in improving mood
Will identify 2 coping skills that assist in improving mood
Report using a coping skill to overcome sadness and worry in order to socialize with peers daily
Will identify 2 coping skills that assist in improving mood
Exhibit improvements in self-grooming, hygiene, sleep and appetite
Will identify 2 coping skills that assist in improving mood
Report using a coping skill to overcome sadness and worry in order to socialize with peers daily
Report using a coping skill to overcome sadness and worry in order to socialize with peers daily
Will identify 2 coping skills that assist in improving mood
Report using a coping skill to overcome sadness and worry in order to socialize with peers daily
Exhibit improvements in self-grooming, hygiene, sleep and appetite

## 2024-09-27 NOTE — BH INPATIENT PSYCHIATRY PROGRESS NOTE - NSBHMSEPERCEPT_PSY_A_CORE
Auditory hallucinations
Auditory hallucinations/Other
Auditory hallucinations
No abnormalities/Other
Auditory hallucinations/Other
No abnormalities/Auditory hallucinations/Other
Auditory hallucinations
No abnormalities/Auditory hallucinations/Other
No abnormalities/Other
No abnormalities/Auditory hallucinations/Other
Auditory hallucinations/Other
No abnormalities/Other

## 2024-09-27 NOTE — BH INPATIENT PSYCHIATRY PROGRESS NOTE - NSTREATMENTCERT_PSY_ALL_CORE
.
Pt's mother is calling in regards to her daughter who tested + for COVID. Mother wants to know what the next steps are for her daughter. Daughter is 14 weeks pregnant. Mother given OB COVID hotline number. Call back if unable to get through.   
.

## 2024-09-27 NOTE — BH INPATIENT PSYCHIATRY PROGRESS NOTE - NSTXPSYCHOGOAL_PSY_ALL_CORE
Will report using a mindfulness skill to be able to read 5 pages of a book daily despite hallucinations
Will identify 2 coping skills that assist with focus on reality
Will be able to report experiencing hallucinations to staff
Will identify 2 coping skills that assist with focus on reality
Will be able to report experiencing hallucinations to staff
Will report using a mindfulness skill to be able to read 5 pages of a book daily despite hallucinations
Will identify 2 coping skills that assist with focus on reality
Will report using a mindfulness skill to be able to read 5 pages of a book daily despite hallucinations
Will report using a mindfulness skill to be able to read 5 pages of a book daily despite hallucinations
Will be able to report experiencing hallucinations to staff

## 2024-09-27 NOTE — BH INPATIENT PSYCHIATRY PROGRESS NOTE - NSTXPSYCHOINTERMD_PSY_ALL_CORE
psychopharmacology  continue haldol  haldol dec 200mg due 9/18

## 2024-09-27 NOTE — BH PSYCHOLOGY - CLINICIAN PSYCHOTHERAPY NOTE - NSTXHYPERGOAL_PSY_ALL_CORE
Will identify 1 modifiable risk factor and a strategy to improve this
Be able to self-monitor blood pressure using a BP machine with demonstration of the technique to the RN

## 2024-09-28 LAB
GLUCOSE BLDC GLUCOMTR-MCNC: 113 MG/DL — HIGH (ref 70–99)
GLUCOSE BLDC GLUCOMTR-MCNC: 114 MG/DL — HIGH (ref 70–99)
GLUCOSE BLDC GLUCOMTR-MCNC: 95 MG/DL — SIGNIFICANT CHANGE UP (ref 70–99)

## 2024-09-28 RX ADMIN — ATORVASTATIN CALCIUM 40 MILLIGRAM(S): 10 TABLET, FILM COATED ORAL at 20:51

## 2024-09-28 RX ADMIN — Medication 10 MILLIGRAM(S): at 08:55

## 2024-09-28 RX ADMIN — Medication 500 MILLIGRAM(S): at 20:51

## 2024-09-28 RX ADMIN — Medication 500 MILLIGRAM(S): at 08:54

## 2024-09-28 RX ADMIN — Medication 1 MILLIGRAM(S): at 20:51

## 2024-09-28 RX ADMIN — LABETALOL HYDROCHLORIDE 200 MILLIGRAM(S): 200 TABLET, FILM COATED ORAL at 20:51

## 2024-09-28 RX ADMIN — Medication 0.4 MILLIGRAM(S): at 20:51

## 2024-09-28 RX ADMIN — Medication 50 MICROGRAM(S): at 07:28

## 2024-09-28 RX ADMIN — Medication 3 MILLIGRAM(S): at 20:51

## 2024-09-28 RX ADMIN — Medication 2 TABLET(S): at 20:52

## 2024-09-28 RX ADMIN — Medication 17 GRAM(S): at 08:56

## 2024-09-29 LAB — GLUCOSE BLDC GLUCOMTR-MCNC: 106 MG/DL — HIGH (ref 70–99)

## 2024-09-29 RX ADMIN — LABETALOL HYDROCHLORIDE 200 MILLIGRAM(S): 200 TABLET, FILM COATED ORAL at 20:09

## 2024-09-29 RX ADMIN — Medication 50 MICROGRAM(S): at 05:38

## 2024-09-29 RX ADMIN — Medication 2 TABLET(S): at 20:08

## 2024-09-29 RX ADMIN — Medication 500 MILLIGRAM(S): at 08:36

## 2024-09-29 RX ADMIN — Medication 1 MILLIGRAM(S): at 20:08

## 2024-09-29 RX ADMIN — Medication 10 MILLIGRAM(S): at 08:36

## 2024-09-29 RX ADMIN — Medication 3 MILLIGRAM(S): at 20:11

## 2024-09-29 RX ADMIN — Medication 500 MILLIGRAM(S): at 20:09

## 2024-09-29 RX ADMIN — ATORVASTATIN CALCIUM 40 MILLIGRAM(S): 10 TABLET, FILM COATED ORAL at 20:09

## 2024-09-29 RX ADMIN — Medication 2 MILLIGRAM(S): at 14:47

## 2024-09-29 RX ADMIN — Medication 0.4 MILLIGRAM(S): at 20:09

## 2024-09-29 NOTE — BH DISCHARGE NOTE NURSING/SOCIAL WORK/PSYCH REHAB - NSDCVIVACCINE_GEN_ALL_CORE_FT
Influenza, split virus, trivalent, preservative free; 21-Sep-2024 12:42; Dedra Freeman); [employer]; [id number] (Exp. Date: 30-Jun-2025); IntraMuscular; Deltoid Left.; 0.5 milliLiter(s); VIS (VIS Published: 06-Aug-2021, VIS Presented: 21-Sep-2024);

## 2024-09-29 NOTE — BH DISCHARGE NOTE NURSING/SOCIAL WORK/PSYCH REHAB - DISCHARGE INSTRUCTIONS AFTERCARE APPOINTMENTS
In order to check the location, date, or time of your aftercare appointment, please refer to your Discharge Instructions Document given to you upon leaving the hospital.  If you have lost the instructions please call [phone]

## 2024-09-29 NOTE — BH DISCHARGE NOTE NURSING/SOCIAL WORK/PSYCH REHAB - PATIENT PORTAL LINK FT
You can access the FollowMyHealth Patient Portal offered by [facility] by registering at the following website: [email or web address]. By joining [employer]’[facility] FollowMyHealth portal, you will also be able to view your health information using other applications (apps) compatible with our system.

## 2024-09-29 NOTE — BH DISCHARGE NOTE NURSING/SOCIAL WORK/PSYCH REHAB - NSDCPRGOAL_PSY_ALL_CORE
Pt made some progress towards his discharge. Pt has demonstrated compliant with medication. Pt has verbally agreed to comply with medication post-discharge. Pt currently denies SI, HI, AH and VH. Pt admitted feeling anxious and paranoid sometimes. Pt has identified his coping skills such as positive thinking, walk away, talk to therapist, positive self-talk and use stress ball to manage symptom. During this hospitalization, pt was observed to be internally preoccupied and anxious at time. During this hospitalization, pt showed approximately 80% of group attendance. During the group session, pt was able to tolerate group structure, attentive, participated relevantly. Pt was visible on the unit somewhat self-isolated, interacts well with selective peers. Pt fair ADLS. Pt has participated in his safety plan.

## 2024-09-29 NOTE — BH DISCHARGE NOTE NURSING/SOCIAL WORK/PSYCH REHAB - NSDCADDINFO1FT_PSY_ALL_CORE
Transportation information: [facility] on 10/1/24.   from your home: 9:30AM   from [facility]: 2:15PM    Main[facility] [employer] ([phone]) call if there is an issue with or with reaching the [facility]. Transportation information: [facility] on 10/1/24.   from your home: 9:30AM   from [facility]: 3:15PM  Main# [employer] ([phone]) call if there is an issue with or with reaching the [facility].  Please note that typical PHP day is 9:00am-3:00pm. The first day starts 10:30am because you are being registered.  Transportation information: [facility] on 10/1/24.   from your home: 9:30AM   from [facility]: 3:15PM  Main[facility] [employer] ([phone]) call if there is an issue with or with reaching the [facility] ([phone]).  Please note that typical PHP day is 9:00am-3:00pm. The first day starts 10:30am because you are being registered.

## 2024-09-29 NOTE — BH DISCHARGE NOTE NURSING/SOCIAL WORK/PSYCH REHAB - NSBHDCADDR1FT_A_CORE
[phone] [place] [address], in the [facility] 2nd [facility].  [phone] [place] [address], in the [facility] 2nd Floor.

## 2024-09-29 NOTE — BH DISCHARGE NOTE NURSING/SOCIAL WORK/PSYCH REHAB - NSCDUDCCRISIS_PSY_A_CORE
[facility]  1 (690) [facility] (186-4180)  Text "WELL" to 89236  Website: www.BeiZ/.[facility] 1 (189) 621-HOPE (8806) Website: www.safehorizon.org/.  [facility] – Crisis Care for Children, Adults and Families  [address] [place] [address]  Mobile Crisis Hotline – (331) 321-7349/.National Suicide Prevention Lifeline 5 (125) 292-0482/.  [facility]  1 (087) LIFEN[facility] (708-4319)/.  [facility]  (454) 729-7408/.  [facility] Behavioral Health Helpline / [facility] Mobile Crisis  (282) 591-IYHK (8124)/.  [facility] Response Crisis Hotline  (544) 570-6017  24 hour telephone crisis intervention and suicide prevention hotline concerned with all mental health issues/.  [facility]  [address] [place] [address] (891) [phone]   Hours:  Monday through Friday from 9 AM to 3 PM/988 Suicide and Crisis Lifeline

## 2024-09-29 NOTE — BH DISCHARGE NOTE NURSING/SOCIAL WORK/PSYCH REHAB - NSDCPEEDUCATION_PSY_ALL_CORE
Patient does not appear to be in any acute distress/shows no evidence of clinical instability at this time.     Reviewed discharge instructions, prescriptions, education and follow up information with patient.     Patient verbalizing understanding. Patient at baseline cardiac, respiratory, and neuro function. Pain controlled.     Patient left ER under baseline transfer modality.     Influenza Vaccination

## 2024-09-30 VITALS — RESPIRATION RATE: 16 BRPM

## 2024-09-30 LAB — GLUCOSE BLDC GLUCOMTR-MCNC: 110 MG/DL — HIGH (ref 70–99)

## 2024-09-30 RX ADMIN — LABETALOL HYDROCHLORIDE 200 MILLIGRAM(S): 200 TABLET, FILM COATED ORAL at 08:42

## 2024-09-30 RX ADMIN — Medication 10 MILLIGRAM(S): at 08:42

## 2024-09-30 RX ADMIN — Medication 10 MILLIGRAM(S): at 08:43

## 2024-09-30 RX ADMIN — Medication 17 GRAM(S): at 08:42

## 2024-09-30 RX ADMIN — Medication 50 MICROGRAM(S): at 06:12

## 2024-09-30 RX ADMIN — Medication 500 MILLIGRAM(S): at 08:43

## 2024-09-30 NOTE — BH SOCIAL WORK INITIAL PSYCHOSOCIAL EVALUATION - NSBHABUSESEXHX_PSY_ALL_CORE
[Nutrition/ Exercise/ Weight Management] : nutrition, exercise, weight management [Body Image] : body image [Vitamins/Supplements] : vitamins/supplements [Breast Self Exam] : breast self exam [Bladder Hygiene] : bladder hygiene Yes...

## 2024-10-02 ENCOUNTER — OUTPATIENT (OUTPATIENT)
Dept: OUTPATIENT SERVICES | Facility: HOSPITAL | Age: 59
LOS: 1 days | Discharge: HOME | End: 2024-10-02
Payer: COMMERCIAL

## 2024-10-02 DIAGNOSIS — K08.199 COMPLETE LOSS OF TEETH DUE TO OTHER SPECIFIED CAUSE, UNSPECIFIED CLASS: Chronic | ICD-10-CM

## 2024-10-02 DIAGNOSIS — J34.2 DEVIATED NASAL SEPTUM: Chronic | ICD-10-CM

## 2024-10-02 DIAGNOSIS — F25.9 SCHIZOAFFECTIVE DISORDER, UNSPECIFIED: ICD-10-CM

## 2024-10-02 LAB
CULTURE RESULTS: SIGNIFICANT CHANGE UP
[facility] SOURCE: SIGNIFICANT CHANGE UP

## 2024-10-02 PROCEDURE — 90792 PSYCH DIAG EVAL W/MED SRVCS: CPT

## 2024-10-02 PROCEDURE — 90791 PSYCH DIAGNOSTIC EVALUATION: CPT

## 2024-10-07 PROCEDURE — 90832 PSYTX W PT 30 MINUTES: CPT

## 2024-10-07 NOTE — ED BEHAVIORAL HEALTH ASSESSMENT NOTE - SAFETY PLAN DETAILS
DISCHARGE SUMMARY    DATE OF ADMISSION:  10/1/2024    ADMISSION DIAGNOSIS:    23 year old  presenting at 21w4d with vaginal bleeding  Vaginal bleeding in first and second trimester  Cerclage in place for cervical insufficiency based on cervical exam  Obesity     DATE OF DISCHARGE:  10/7/2024    DISCHARGE DIAGNOSIS:   Same, now  at 22w3d   S/p cerclage removal    HOSPITAL COURSE:  Helena Stanford is a 23 year old  female at 22w3d with BRENDA: 2025 by 5 wk US, not consistent with LMP, admitted to [facility] as transfer from [facility] for vaginal bleeding. She has received prenatal care in [place]. Her pregnancy is complicated by bleeding episodes in the first and second trimester with a cerclage placed at 20w1d due to concern for cervical insufficiency (notes 1 cm of dilation though on chart review cervical length 3.04cm) and obesity with BMI of 35.     The patient presented to [facility] on 10/1 after having a large gush of blood followed by continued bleeding. Initial exam at [facility] noted blood within the vagina and present on the vulva. An ultrasound was performed with an estimated fetal weight of 385g, 47%ile, AC 55%ile, reversed flow noted on MCA. Placenta was posterior with a marginal placental abruption noted. She was seen and counseled by Dr. Valdes with maternal fetal medicine. After extensive discussion with maternal fetal medicine and [facility] hospitalist, decision was made to transfer to [facility] due to availability of providers to perform D&E if emergency situation arose.      On arrival to [place] her bleeding had significantly decreased and she denied abdominal pain or cramping. She declined cerclage removal. She continued to strongly desire expectant management with intervention only in the setting of maternal indication (clinical chorioamnionitis, significant maternal bleeding, hemodynamic instability) or for fetal demise. She declines termination. 10/2 patient noted  continued bright red bleeding, a speculum exam was performed to assess for cervical tearing in setting of cerclage. Cerclage strings were not on tension. Blood clots were  were noted within the vagina though no active bleeding and cervix was visually closed and thick. She was seen by the neonatologist and desires  resuscitation. She is s/p betamethasone x2 (10/2 - 10/3).     After further discussion, the patient was agreeable to cerclage removal on 10/3. A bivalve speculum was inserted into the vagina and the cervix was visualized. Old dark blood was noted in the vaginal canal and at the cervical os. No active bleeding was noted. The knot tower was grasped with a ring forceps and suture was cut and removed in one piece. Following removal of the cerclage, the cervix appeared visibly closed and long.      On 10/5 she reported a small amount of spotting when wiping but no bright red bleeding and concerns for leakage of fluid. Speculum exam was performed. Vaginal discharge was noted. Pooling, Valsalva and ferning were negative. Chlamydia, gonorrhea, trichomonas, and vaginitis panel still pending at time of discharge.      On date of discharge she was ambulating, voiding, tolerating regular diet. She endorsed good fetal movement, denied leaking of fluids and contractions. She denied fever, chills, chest pain, and/or shortness of breath, nausea, vomiting, lower extremity pain or swelling.  Her vital signs were stable and she was ready for discharge to home in stable condition.    Visit Vitals  /56 (BP Location: RUE - Right upper extremity, Patient Position: Semi-Ovalle's)   Pulse 75   Temp 98.9 °F (37.2 °C) (Oral)   Resp 18   Ht 5' 7\" (1.702 m)   Wt 101.4 kg (223 lb 8.7 oz)   LMP 2024   BMI 35.01 kg/m²     Gen: WDWN, no acute distress  HEENT: EOMI, no scleral icterus  CV: RRR, no murmur, pulses intact  Resp: CTAB, no wheeze and No respiratory distress, speaking in complete sentences  Extrem: moves all  extremities, no edema  Abd: gravid, soft, nontender, normal bowel sounds    DISPOSITION:   Home/Self-care    CONDITION ON DISCHARGE:   Stable    MEDICATIONS:     Summary of your Discharge Medications        Take these Medications        Details   albuterol 108 (90 Base) MCG/ACT inhaler   Inhale 2 puffs into the lungs every 4 hours as needed for Shortness of Breath or Wheezing.     aspirin 81 MG chewable tablet   Chew 81 mg by mouth daily.     cyclobenzaprine 5 MG tablet  Commonly known as: FLEXERIL   Take 5 mg by mouth 3 times daily as needed for Muscle spasms.     multivitamin & mineral w/folic acid- PRENATAL 27-1 MG Tab   Take 1 tablet by mouth daily.              FOLLOW UP AND DISCHARGE INSTRUCTIONS:  Establish care with Maternal Fetal Medicine for OB care. Message sent to clinic for appointment to be scheduled.       Nora Cooney MD  Family Medicine Resident, PGY-1       Call 911 or go to the nearest ER if you become suicidal or homicidal.

## 2024-10-08 ENCOUNTER — APPOINTMENT (OUTPATIENT)
Dept: ALLERGY | Facility: CLINIC | Age: 59
End: 2024-10-08
Payer: MEDICARE

## 2024-10-08 VITALS
HEIGHT: 74 IN | DIASTOLIC BLOOD PRESSURE: 68 MMHG | BODY MASS INDEX: 34.39 KG/M2 | SYSTOLIC BLOOD PRESSURE: 130 MMHG | TEMPERATURE: 97.4 F | HEART RATE: 77 BPM | WEIGHT: 268 LBS | OXYGEN SATURATION: 99 %

## 2024-10-08 PROCEDURE — 99214 OFFICE O/P EST MOD 30 MIN: CPT

## 2024-10-08 RX ORDER — LABETALOL HYDROCHLORIDE 300 MG/1
TABLET, FILM COATED ORAL
Refills: 0 | Status: ACTIVE | COMMUNITY

## 2024-10-08 RX ORDER — ESCITALOPRAM OXALATE 10 MG/1
10 TABLET ORAL
Refills: 0 | Status: ACTIVE | COMMUNITY

## 2024-10-08 RX ORDER — ATORVASTATIN CALCIUM 80 MG/1
TABLET, FILM COATED ORAL
Refills: 0 | Status: ACTIVE | COMMUNITY

## 2024-10-08 RX ORDER — TAMSULOSIN HYDROCHLORIDE 0.4 MG/1
0.4 CAPSULE ORAL
Refills: 0 | Status: ACTIVE | COMMUNITY

## 2024-10-08 RX ORDER — AMOXICILLIN AND CLAVULANATE POTASSIUM 875; 125 MG/1; MG/1
875-125 TABLET, COATED ORAL
Qty: 20 | Refills: 0 | Status: ACTIVE | COMMUNITY
Start: 2024-10-08 | End: 1900-01-01

## 2024-10-08 RX ORDER — HALOPERIDOL DECANOATE 50 MG/ML
INJECTION INTRAMUSCULAR
Refills: 0 | Status: ACTIVE | COMMUNITY

## 2024-10-08 RX ORDER — METFORMIN HYDROCHLORIDE 625 MG/1
TABLET ORAL
Refills: 0 | Status: ACTIVE | COMMUNITY

## 2024-10-09 PROCEDURE — 99214 OFFICE O/P EST MOD 30 MIN: CPT

## 2024-10-10 ENCOUNTER — APPOINTMENT (OUTPATIENT)
Dept: RHEUMATOLOGY | Facility: CLINIC | Age: 59
End: 2024-10-10
Payer: MEDICARE

## 2024-10-10 VITALS
TEMPERATURE: 98 F | SYSTOLIC BLOOD PRESSURE: 126 MMHG | HEART RATE: 62 BPM | RESPIRATION RATE: 16 BRPM | OXYGEN SATURATION: 98 % | DIASTOLIC BLOOD PRESSURE: 76 MMHG

## 2024-10-10 VITALS
DIASTOLIC BLOOD PRESSURE: 77 MMHG | SYSTOLIC BLOOD PRESSURE: 128 MMHG | HEART RATE: 63 BPM | OXYGEN SATURATION: 99 % | RESPIRATION RATE: 16 BRPM

## 2024-10-10 PROCEDURE — 96365 THER/PROPH/DIAG IV INF INIT: CPT | Mod: PD

## 2024-10-10 PROCEDURE — 96366 THER/PROPH/DIAG IV INF ADDON: CPT | Mod: PD

## 2024-10-10 RX ORDER — ACETAMINOPHEN 325 MG/1
325 TABLET ORAL
Qty: 0 | Refills: 0 | Status: COMPLETED
Start: 2024-05-23

## 2024-10-10 RX ORDER — IMMUNE GLOBULIN INTRAVENOUS (HUMAN) 10 G
10 KIT INTRAVENOUS
Qty: 0 | Refills: 0 | Status: COMPLETED
Start: 2024-09-04

## 2024-10-10 RX ORDER — DIPHENHYDRAMINE HCL 25 MG/1
25 TABLET ORAL
Qty: 0 | Refills: 0 | Status: COMPLETED
Start: 2024-05-23

## 2024-10-11 ENCOUNTER — INPATIENT (INPATIENT)
Facility: HOSPITAL | Age: 59
LOS: 1 days | Discharge: HOME | End: 2024-10-13
Attending: HOSPITALIST | Admitting: HOSPITALIST
Payer: MEDICARE

## 2024-10-11 VITALS
HEIGHT: 74 IN | DIASTOLIC BLOOD PRESSURE: 75 MMHG | HEART RATE: 70 BPM | TEMPERATURE: 98 F | SYSTOLIC BLOOD PRESSURE: 144 MMHG | OXYGEN SATURATION: 95 % | RESPIRATION RATE: 14 BRPM | WEIGHT: 199.96 LBS

## 2024-10-11 DIAGNOSIS — E11.9 TYPE 2 DIABETES MELLITUS WITHOUT COMPLICATIONS: ICD-10-CM

## 2024-10-11 DIAGNOSIS — E03.9 HYPOTHYROIDISM, UNSPECIFIED: ICD-10-CM

## 2024-10-11 DIAGNOSIS — K08.199 COMPLETE LOSS OF TEETH DUE TO OTHER SPECIFIED CAUSE, UNSPECIFIED CLASS: Chronic | ICD-10-CM

## 2024-10-11 DIAGNOSIS — J34.2 DEVIATED NASAL SEPTUM: Chronic | ICD-10-CM

## 2024-10-11 DIAGNOSIS — L02.429 FURUNCLE OF LIMB, UNSPECIFIED: ICD-10-CM

## 2024-10-11 DIAGNOSIS — Z29.9 ENCOUNTER FOR PROPHYLACTIC MEASURES, UNSPECIFIED: ICD-10-CM

## 2024-10-11 DIAGNOSIS — R53.1 WEAKNESS: ICD-10-CM

## 2024-10-11 DIAGNOSIS — I10 ESSENTIAL (PRIMARY) HYPERTENSION: ICD-10-CM

## 2024-10-11 DIAGNOSIS — F25.9 SCHIZOAFFECTIVE DISORDER, UNSPECIFIED: ICD-10-CM

## 2024-10-11 DIAGNOSIS — E87.1 HYPO-OSMOLALITY AND HYPONATREMIA: ICD-10-CM

## 2024-10-11 DIAGNOSIS — D83.9 COMMON VARIABLE IMMUNODEFICIENCY, UNSPECIFIED: ICD-10-CM

## 2024-10-11 LAB
ALBUMIN SERPL ELPH-MCNC: 4.2 G/DL — SIGNIFICANT CHANGE UP (ref 3.3–5)
ALP SERPL-CCNC: 115 U/L — SIGNIFICANT CHANGE UP (ref 40–120)
ALT FLD-CCNC: 13 U/L — SIGNIFICANT CHANGE UP (ref 4–41)
ANION GAP SERPL CALC-SCNC: 13 MMOL/L — SIGNIFICANT CHANGE UP (ref 7–14)
APPEARANCE UR: CLEAR — SIGNIFICANT CHANGE UP
AST SERPL-CCNC: 20 U/L — SIGNIFICANT CHANGE UP (ref 4–40)
BACTERIA # UR AUTO: NEGATIVE /HPF — SIGNIFICANT CHANGE UP
BASOPHILS # BLD AUTO: 0.05 K/UL — SIGNIFICANT CHANGE UP (ref 0–0.2)
BASOPHILS NFR BLD AUTO: 1 % — SIGNIFICANT CHANGE UP (ref 0–2)
BILIRUB SERPL-MCNC: 0.2 MG/DL — SIGNIFICANT CHANGE UP (ref 0.2–1.2)
BILIRUB UR-MCNC: NEGATIVE — SIGNIFICANT CHANGE UP
BUN SERPL-MCNC: 12 MG/DL — SIGNIFICANT CHANGE UP (ref 7–23)
CALCIUM SERPL-MCNC: 9.5 MG/DL — SIGNIFICANT CHANGE UP (ref 8.4–10.5)
CAST: 0 /LPF — SIGNIFICANT CHANGE UP (ref 0–4)
CHLORIDE SERPL-SCNC: 99 MMOL/L — SIGNIFICANT CHANGE UP (ref 98–107)
CO2 SERPL-SCNC: 19 MMOL/L — LOW (ref 22–31)
COLOR SPEC: YELLOW — SIGNIFICANT CHANGE UP
CREAT SERPL-MCNC: 0.76 MG/DL — SIGNIFICANT CHANGE UP (ref 0.5–1.3)
DIFF PNL FLD: NEGATIVE — SIGNIFICANT CHANGE UP
EGFR: 105 ML/MIN/1.73M2 — SIGNIFICANT CHANGE UP
EOSINOPHIL # BLD AUTO: 0.15 K/UL — SIGNIFICANT CHANGE UP (ref 0–0.5)
EOSINOPHIL NFR BLD AUTO: 3 % — SIGNIFICANT CHANGE UP (ref 0–6)
GLUCOSE SERPL-MCNC: 108 MG/DL — HIGH (ref 70–99)
GLUCOSE UR QL: NEGATIVE MG/DL — SIGNIFICANT CHANGE UP
HCT VFR BLD CALC: 36.7 % — LOW (ref 39–50)
HGB BLD-MCNC: 12.4 G/DL — LOW (ref 13–17)
IANC: 3.69 K/UL — SIGNIFICANT CHANGE UP (ref 1.8–7.4)
IMM GRANULOCYTES NFR BLD AUTO: 0.2 % — SIGNIFICANT CHANGE UP (ref 0–0.9)
KETONES UR-MCNC: NEGATIVE MG/DL — SIGNIFICANT CHANGE UP
LEUKOCYTE ESTERASE UR-ACNC: NEGATIVE — SIGNIFICANT CHANGE UP
LYMPHOCYTES # BLD AUTO: 0.58 K/UL — LOW (ref 1–3.3)
LYMPHOCYTES # BLD AUTO: 11.7 % — LOW (ref 13–44)
MAGNESIUM SERPL-MCNC: 2.3 MG/DL — SIGNIFICANT CHANGE UP (ref 1.6–2.6)
MCHC RBC-ENTMCNC: 29.2 PG — SIGNIFICANT CHANGE UP (ref 27–34)
MCHC RBC-ENTMCNC: 33.8 GM/DL — SIGNIFICANT CHANGE UP (ref 32–36)
MCV RBC AUTO: 86.4 FL — SIGNIFICANT CHANGE UP (ref 80–100)
MONOCYTES # BLD AUTO: 0.46 K/UL — SIGNIFICANT CHANGE UP (ref 0–0.9)
MONOCYTES NFR BLD AUTO: 9.3 % — SIGNIFICANT CHANGE UP (ref 2–14)
NEUTROPHILS # BLD AUTO: 3.69 K/UL — SIGNIFICANT CHANGE UP (ref 1.8–7.4)
NEUTROPHILS NFR BLD AUTO: 74.8 % — SIGNIFICANT CHANGE UP (ref 43–77)
NITRITE UR-MCNC: NEGATIVE — SIGNIFICANT CHANGE UP
NRBC # BLD: 0 /100 WBCS — SIGNIFICANT CHANGE UP (ref 0–0)
NRBC # FLD: 0 K/UL — SIGNIFICANT CHANGE UP (ref 0–0)
OSMOLALITY SERPL: 280 MOSM/KG — SIGNIFICANT CHANGE UP (ref 275–295)
OSMOLALITY UR: 311 MOSM/KG — SIGNIFICANT CHANGE UP (ref 50–1200)
PH UR: 7 — SIGNIFICANT CHANGE UP (ref 5–8)
PHOSPHATE SERPL-MCNC: 3 MG/DL — SIGNIFICANT CHANGE UP (ref 2.5–4.5)
PLATELET # BLD AUTO: 187 K/UL — SIGNIFICANT CHANGE UP (ref 150–400)
POTASSIUM SERPL-MCNC: 4.6 MMOL/L — SIGNIFICANT CHANGE UP (ref 3.5–5.3)
POTASSIUM SERPL-SCNC: 4.6 MMOL/L — SIGNIFICANT CHANGE UP (ref 3.5–5.3)
PROT SERPL-MCNC: 8.2 G/DL — SIGNIFICANT CHANGE UP (ref 6–8.3)
PROT UR-MCNC: 30 MG/DL
RBC # BLD: 4.25 M/UL — SIGNIFICANT CHANGE UP (ref 4.2–5.8)
RBC # FLD: 14.9 % — HIGH (ref 10.3–14.5)
RBC CASTS # UR COMP ASSIST: 0 /HPF — SIGNIFICANT CHANGE UP (ref 0–4)
SODIUM SERPL-SCNC: 131 MMOL/L — LOW (ref 135–145)
SP GR SPEC: 1.01 — SIGNIFICANT CHANGE UP (ref 1–1.03)
SQUAMOUS # UR AUTO: 0 /HPF — SIGNIFICANT CHANGE UP (ref 0–5)
TSH SERPL-MCNC: 0.94 UIU/ML — SIGNIFICANT CHANGE UP (ref 0.27–4.2)
UROBILINOGEN FLD QL: 0.2 MG/DL — SIGNIFICANT CHANGE UP (ref 0.2–1)
WBC # BLD: 4.94 K/UL — SIGNIFICANT CHANGE UP (ref 3.8–10.5)
WBC # FLD AUTO: 4.94 K/UL — SIGNIFICANT CHANGE UP (ref 3.8–10.5)
WBC UR QL: 0 /HPF — SIGNIFICANT CHANGE UP (ref 0–5)

## 2024-10-11 PROCEDURE — 99285 EMERGENCY DEPT VISIT HI MDM: CPT | Mod: FS

## 2024-10-11 PROCEDURE — 99223 1ST HOSP IP/OBS HIGH 75: CPT

## 2024-10-11 RX ORDER — ALCOHOL ANTISEPTIC PADS
12.5 PADS, MEDICATED (EA) TOPICAL ONCE
Refills: 0 | Status: DISCONTINUED | OUTPATIENT
Start: 2024-10-11 | End: 2024-10-13

## 2024-10-11 RX ORDER — GLUCAGON INJECTION, SOLUTION 0.5 MG/.1ML
1 INJECTION, SOLUTION SUBCUTANEOUS ONCE
Refills: 0 | Status: DISCONTINUED | OUTPATIENT
Start: 2024-10-11 | End: 2024-10-13

## 2024-10-11 RX ORDER — TAMSULOSIN HCL 0.4 MG
0.4 CAPSULE ORAL AT BEDTIME
Refills: 0 | Status: DISCONTINUED | OUTPATIENT
Start: 2024-10-11 | End: 2024-10-13

## 2024-10-11 RX ORDER — LABETALOL HYDROCHLORIDE 200 MG/1
200 TABLET, FILM COATED ORAL
Refills: 0 | Status: DISCONTINUED | OUTPATIENT
Start: 2024-10-11 | End: 2024-10-13

## 2024-10-11 RX ORDER — IMMUNE GLOBULIN INTRAVENOUS (HUMAN) 5 G/50ML
0 INJECTION, SOLUTION INTRAVENOUS
Refills: 0 | DISCHARGE

## 2024-10-11 RX ORDER — LIDOCAINE 50 MG/G
1 CREAM TOPICAL ONCE
Refills: 0 | Status: COMPLETED | OUTPATIENT
Start: 2024-10-11 | End: 2024-10-11

## 2024-10-11 RX ORDER — SODIUM CHLORIDE IRRIG SOLUTION 0.9 %
1000 SOLUTION, IRRIGATION IRRIGATION
Refills: 0 | Status: DISCONTINUED | OUTPATIENT
Start: 2024-10-11 | End: 2024-10-13

## 2024-10-11 RX ORDER — ALCOHOL ANTISEPTIC PADS
25 PADS, MEDICATED (EA) TOPICAL ONCE
Refills: 0 | Status: DISCONTINUED | OUTPATIENT
Start: 2024-10-11 | End: 2024-10-13

## 2024-10-11 RX ORDER — ESCITALOPRAM OXALATE 10 MG
10 TABLET ORAL DAILY
Refills: 0 | Status: DISCONTINUED | OUTPATIENT
Start: 2024-10-11 | End: 2024-10-13

## 2024-10-11 RX ORDER — INSULIN LISPRO 100/ML
VIAL (ML) SUBCUTANEOUS
Refills: 0 | Status: DISCONTINUED | OUTPATIENT
Start: 2024-10-11 | End: 2024-10-13

## 2024-10-11 RX ORDER — ACETAMINOPHEN 325 MG
650 TABLET ORAL ONCE
Refills: 0 | Status: COMPLETED | OUTPATIENT
Start: 2024-10-11 | End: 2024-10-11

## 2024-10-11 RX ORDER — ACETAMINOPHEN 325 MG
650 TABLET ORAL EVERY 6 HOURS
Refills: 0 | Status: DISCONTINUED | OUTPATIENT
Start: 2024-10-11 | End: 2024-10-13

## 2024-10-11 RX ORDER — AMLODIPINE BESYLATE 5 MG
10 TABLET ORAL DAILY
Refills: 0 | Status: DISCONTINUED | OUTPATIENT
Start: 2024-10-11 | End: 2024-10-13

## 2024-10-11 RX ORDER — ALCOHOL ANTISEPTIC PADS
15 PADS, MEDICATED (EA) TOPICAL ONCE
Refills: 0 | Status: DISCONTINUED | OUTPATIENT
Start: 2024-10-11 | End: 2024-10-13

## 2024-10-11 RX ORDER — LIDOCAINE 50 MG/G
1 CREAM TOPICAL DAILY
Refills: 0 | Status: DISCONTINUED | OUTPATIENT
Start: 2024-10-11 | End: 2024-10-13

## 2024-10-11 RX ORDER — METFORMIN HCL 500 MG
500 TABLET ORAL
Refills: 0 | Status: DISCONTINUED | OUTPATIENT
Start: 2024-10-11 | End: 2024-10-11

## 2024-10-11 RX ADMIN — LIDOCAINE 1 PATCH: 50 CREAM TOPICAL at 16:50

## 2024-10-11 RX ADMIN — Medication 650 MILLIGRAM(S): at 16:50

## 2024-10-11 RX ADMIN — Medication 0.4 MILLIGRAM(S): at 22:31

## 2024-10-11 NOTE — ED ADULT NURSE REASSESSMENT NOTE - NS ED NURSE REASSESS COMMENT FT1
Break RN: Pt is A&Ox3, resting in stretcher with no complaints at this time. Respirations even and unlabored, chest rise equal b/l. Pt denies chest pain, SOB, abdominal pain, N/V/D, h/a, dizziness, numbness/tingling or any urinary symptoms at this time. No acute distress noted. Safety maintained throughout.

## 2024-10-11 NOTE — ED PROVIDER NOTE - OBJECTIVE STATEMENT
57 yo M with PMH of schizoaffective disorder (bipolar type, w/ multiple [facility] admissions), HTN, HLD, hypothyroidism, CVID/ hypogammaglobulinemia (on monthly IVIG, last on 7/23, follows with Dr. Mitchell Boxer), COPD not on home O2, psychogenic polydipsia, presents c/o generalized weakness, "head feels funny" today. States he "drank too much fluids last night"; thinks he drank 2 gallons.

## 2024-10-11 NOTE — H&P ADULT - HISTORY OF PRESENT ILLNESS
7 yo M with PMH of schizoaffective disorder (bipolar type, w/ multiple [facility] admissions), HTN, HLD, hypothyroidism, CVID/ hypogammaglobulinemia (on monthly IVIG, last on 10/10/24 who presents with 1 day history of generalized weakness. History obtained from patient, patient's mother and sister at bedside. They report that patient went for IVIG yesterday and went home afterwards w/o any complaints. The following day he was reportedly able to ambulate to his psych clinic where he received haldol IM injection, however afterwards patient reported weakness and inability to ambulate and reported his "head felt funny". Patient endorses similar symptoms 6 months ago. Med history is also notable for recent admission inpatient to [facility] for labetalol OD, was then d/c to [facility] and d/c home 9/27. Course complicated by MRSA skin infection to scalp requiring surgery I&D 59 yo M with PMH of schizoaffective disorder (bipolar type, w/ multiple [facility] admissions), HTN, HLD, hypothyroidism, MRSA skin infection, CVID/ hypogammaglobulinemia (on monthly IVIG, last on 10/10/24) who presents with 1 day history of generalized weakness. History obtained from patient, patient's mother and sister at bedside. They report that patient went for IVIG yesterday and went home afterwards w/o any complaints. The following day he was reportedly able to ambulate to his psych clinic where he received haldol IM injection, however afterwards patient reported weakness and inability to ambulate and reported his "head felt funny". Patient endorses similar symptoms 6 months ago. Med history is also notable for recent admission inpatient to [facility] for labetalol OD, was then d/c to [facility] and d/c home 9/27. Course complicated by MRSA skin infection to scalp requiring surgery I&D, prescribed augmentin outpatient by PCP to be taken 10/8-10/18. Has been adherent to fluid restriction at home given hx hyponatremia likely i/s/o psychogenic polydipsia. ED course notable for fall while attempting to use restroom, noted to land on on his rear while holding onto rail - no head trauma. Assessment only notable for R sided neck pain that is resolving, and pt. was given tylenol, lidocaine patch, and cold compress. CT imaging deferred at that time. Pt reports feeling better on my assessment, denies dizziness/weakness at this time.

## 2024-10-11 NOTE — ED ADULT TRIAGE NOTE - CHIEF COMPLAINT QUOTE
Pt c/o generalized weakness, "head feels funny" after Haldol injection 10:15 AM today. Phx schizophrenia, HTN, DM2

## 2024-10-11 NOTE — H&P ADULT - PROBLEM SELECTOR PLAN 1
-pt reporting 1 day weakness i/s/o recent IVIG administration and haldol IM administration causing patient to have difficulty with ambulation.  -no motor deficits on neuro exam however pt did have fall in bathroom w/o head involvement  -c/w lidocaine patch daily, tylenol PRN for pain and cold compresses  -EKG showing NSR, QTC normal limits 417  -PT consult  -fall precautions

## 2024-10-11 NOTE — H&P ADULT - NSHPPHYSICALEXAM_GEN_ALL_CORE
VITALS:   T(C): 36.7 (10-11-24 @ 19:30), Max: 36.9 (10-11-24 @ 11:08)  HR: 61 (10-11-24 @ 19:30) (61 - 85)  BP: 133/70 (10-11-24 @ 19:30) (133/70 - 174/87)  RR: 18 (10-11-24 @ 19:30) (14 - 18)  SpO2: 97% (10-11-24 @ 19:30) (95% - 100%)    GENERAL: NAD, lying in bed comfortably  HEAD:  Atraumatic, normocephalic  EYES: EOMI, PERRLA, conjunctiva and sclera clear  ENT: Moist mucous membranes  NECK: Supple, no JVD  HEART: Regular rate and rhythm, no murmurs, rubs, or gallops  LUNGS: Unlabored respirations.  Clear to auscultation bilaterally, no crackles, wheezing, or rhonchi  ABDOMEN: Soft, nontender, nondistended, +BS  EXTREMITIES: 2+ peripheral pulses bilaterally. No clubbing, cyanosis, or edema  NERVOUS SYSTEM:  A&Ox3, no focal deficits   SKIN: Furuncles noted on armpits bilaterally, L>R - no warmth or erythema noted however 1-2 open furuncles noted on L armpit with minimal pustular discharge

## 2024-10-11 NOTE — ED ADULT NURSE REASSESSMENT NOTE - NS ED NURSE REASSESS COMMENT FT1
pt resting comfortable. vitals taken. pt states he has no pain abd feeling better. hospitalist present speaking with pt and both sisters.     attempt to give report to 7S. per RN NIEVES, HIS MANAGER states that pt will need to be reassigned due to admit for weakness and not behavioral issue.

## 2024-10-11 NOTE — ED PROVIDER NOTE - ATTENDING APP SHARED VISIT CONTRIBUTION OF CARE
Dr. Dillon:  I performed a face to face bedside interview with patient regarding history of present illness, review of symptoms and past medical history. I completed an independent physical exam.  I have discussed patient's plan of care with PA.   I agree with note as stated above, having amended the EMR as needed to reflect my findings.   This includes HISTORY OF PRESENT ILLNESS, HIV, PAST MEDICAL/SURGICAL/FAMILY/SOCIAL HISTORY, ALLERGIES AND HOME MEDICATIONS, REVIEW OF SYSTEMS, PHYSICAL EXAM, and any PROGRESS NOTES during the time I functioned as the attending physician for this patient.    58M h/o schizoaffective disorder (bipolar type, w/ multiple [facility] admissions), HTN, HLD, hypothyroidism, CVID/ hypogammaglobulinemia (on monthly IVIG, last on 7/23, follows with Dr. Mitchell Boxer) COPD not on home O2, psychogenic polydipsia, presents c/o feeling "shaky" and tired.  States he "drank too much fluids last night"; thinks he drank 2 gallons.    Exam:  - nad  - rrr  - ctab   -abd sof tntnd    A/P  - concern for hyponatremia eval electrolytes, eval occult infeciton  - cbc, cmp, osm, ua, cxr, flu/covid

## 2024-10-11 NOTE — H&P ADULT - NSHPLABSRESULTS_GEN_ALL_CORE
LABS:                          12.4   4.94  )-----------( 187      ( 11 Oct 2024 14:26 )             36.7     10-11    131[L]  |  99  |  12  ----------------------------<  108[H]  4.6   |  19[L]  |  0.76    Ca    9.5      11 Oct 2024 14:26  Phos  3.0     10-11  Mg     2.30     10-11    TPro  8.2  /  Alb  4.2  /  TBili  0.2  /  DBili  x   /  AST  20  /  ALT  13  /  AlkPhos  115  10-11

## 2024-10-11 NOTE — ED PROVIDER NOTE - NSICDXPASTMEDICALHX_GEN_ALL_CORE_FT
PAST MEDICAL HISTORY:  Bipolar disorder     Chronic hyponatremia     CVID (common variable immunodeficiency)     DM (diabetes mellitus)     HTN (hypertension)     Hyponatremia     Smoker     Smoker

## 2024-10-11 NOTE — ED ADULT NURSE NOTE - HIV OFFER
Patient presents ambulatory to room 39 for suicidal ideations. Patient reports he was with his gf who passed away via diabetes at [facility] earlier this month. Patient states that he had plans on marrying her and because he was unable to carry that out life has been hard and he has been having suicidal thoughts since then. Patient reports he has been having multiple plans to carry it out with one being to drink antifreeze. Patient also reports that he has been hospitalized previously for suicidal ideations. 1010- Dr. Jadon Ayala at bedside to evaluate patient at this time. 1155- [facility] at bedside evaluating patient at this time.     1627- Patient left with [facility] to [facility] at this time Previously Declined (within the last year)

## 2024-10-11 NOTE — H&P ADULT - PROBLEM SELECTOR PLAN 5
-pt with hx MRSA skin infections including previous prolonged hospital stay due to sepsis 2/2 infection'  -physical exam notable for furuncles on axillae bilaterally, L>R, some open and mildly weeping  -no redness/erythema concerning for underlying cellulitis, patient afebrile  -c/w augmentin prescribed by outpatient PCP  -MRSA swab  -contact precautions.  -monitor for fevers - if febrile can give tylenol and convert to IV vancomycin and obtain blood cultures

## 2024-10-11 NOTE — H&P ADULT - PROBLEM SELECTOR PLAN 4
-pt with hx schizoaffective disorder w/ bipolar type, s/p recent admission for labetalol overdose with resulting [facility] inpatient admission  -pt. not displaying any psychiatric symptoms at this time, is otherwise cooperative and with normal affect  -s/p haldol IM injection outpatient  -c/w escitalopram 10 qd  -if patient endorses new psych complaints would consult psych CL

## 2024-10-11 NOTE — ED PROVIDER NOTE - PROGRESS NOTE DETAILS
PA AMALIA: per RN, while in bathroom, patient fell, sat on butt, held on to rails, no head trauma. Pt reassessed, only c/o right side neck pain. Tylenol, lidocaine patch, cold compress (preferred by patient). Discussed with attending, no CT imaging necessary. Discussed plan with patient and sister at bedside. Given pt's weakness and fall, plan to admit. Spoke with hospitalist, Dr. Morales, accepted pt for admission.

## 2024-10-11 NOTE — H&P ADULT - ASSESSMENT
57 yo M with PMH of schizoaffective disorder (bipolar type, w/ multiple [facility] admissions), HTN, HLD, hypothyroidism, MRSA skin infection, CVID/ hypogammaglobulinemia (on monthly IVIG, last on 10/10/24) who presents with 1 day history of generalized weakness

## 2024-10-11 NOTE — ED PROVIDER NOTE - CLINICAL SUMMARY MEDICAL DECISION MAKING FREE TEXT BOX
57 yo M with PMH of schizoaffective disorder (bipolar type, w/ multiple [facility] admissions), HTN, HLD, hypothyroidism, CVID/ hypogammaglobulinemia (on monthly IVIG, last on 7/23, follows with Dr. Mitchell Boxer), COPD not on home O2, psychogenic polydipsia, presents c/o generalized weakness, "head feels funny" today. well appearing male. no focal neuro deficits. Ddx: hyponatremia. Plan: labs, Ua, TSH, and reassessment.

## 2024-10-11 NOTE — ED ADULT NURSE REASSESSMENT NOTE - NS ED NURSE REASSESS COMMENT FT1
per pt sister, keya haynes. she is pt's HCP. She is requesting that vistors from [facility] AND [facility] BERTHA COWAN FROM [facility] NOT BE PERMITTED TO VISIT PT.

## 2024-10-11 NOTE — H&P ADULT - PROBLEM SELECTOR PLAN 2
-pt. with hx hyponatremia i/s/o psychogenic polydipsia  -on 1.5L fluid restriction while inpatient previously, continued outpatient  -Na 131 on labs in ED, patient mental status at baseline  -c/w fluid restriction and monitor BMP in AM

## 2024-10-12 LAB
ALBUMIN SERPL ELPH-MCNC: 4.1 G/DL — SIGNIFICANT CHANGE UP (ref 3.3–5)
ALP SERPL-CCNC: 116 U/L — SIGNIFICANT CHANGE UP (ref 40–120)
ALT FLD-CCNC: 13 U/L — SIGNIFICANT CHANGE UP (ref 4–41)
ANION GAP SERPL CALC-SCNC: 12 MMOL/L — SIGNIFICANT CHANGE UP (ref 7–14)
AST SERPL-CCNC: 16 U/L — SIGNIFICANT CHANGE UP (ref 4–40)
BILIRUB SERPL-MCNC: 0.4 MG/DL — SIGNIFICANT CHANGE UP (ref 0.2–1.2)
BUN SERPL-MCNC: 13 MG/DL — SIGNIFICANT CHANGE UP (ref 7–23)
CALCIUM SERPL-MCNC: 9.4 MG/DL — SIGNIFICANT CHANGE UP (ref 8.4–10.5)
CHLORIDE SERPL-SCNC: 98 MMOL/L — SIGNIFICANT CHANGE UP (ref 98–107)
CO2 SERPL-SCNC: 23 MMOL/L — SIGNIFICANT CHANGE UP (ref 22–31)
CREAT SERPL-MCNC: 0.77 MG/DL — SIGNIFICANT CHANGE UP (ref 0.5–1.3)
EGFR: 105 ML/MIN/1.73M2 — SIGNIFICANT CHANGE UP
GLUCOSE SERPL-MCNC: 91 MG/DL — SIGNIFICANT CHANGE UP (ref 70–99)
HCT VFR BLD CALC: 35.9 % — LOW (ref 39–50)
HGB BLD-MCNC: 12.2 G/DL — LOW (ref 13–17)
MAGNESIUM SERPL-MCNC: 2.2 MG/DL — SIGNIFICANT CHANGE UP (ref 1.6–2.6)
MCHC RBC-ENTMCNC: 29 PG — SIGNIFICANT CHANGE UP (ref 27–34)
MCHC RBC-ENTMCNC: 34 GM/DL — SIGNIFICANT CHANGE UP (ref 32–36)
MCV RBC AUTO: 85.5 FL — SIGNIFICANT CHANGE UP (ref 80–100)
MRSA PCR RESULT.: SIGNIFICANT CHANGE UP
NRBC # BLD: 0 /100 WBCS — SIGNIFICANT CHANGE UP (ref 0–0)
NRBC # FLD: 0 K/UL — SIGNIFICANT CHANGE UP (ref 0–0)
PHOSPHATE SERPL-MCNC: 2.9 MG/DL — SIGNIFICANT CHANGE UP (ref 2.5–4.5)
PLATELET # BLD AUTO: 190 K/UL — SIGNIFICANT CHANGE UP (ref 150–400)
POTASSIUM SERPL-MCNC: 3.9 MMOL/L — SIGNIFICANT CHANGE UP (ref 3.5–5.3)
POTASSIUM SERPL-SCNC: 3.9 MMOL/L — SIGNIFICANT CHANGE UP (ref 3.5–5.3)
PROT SERPL-MCNC: 7.7 G/DL — SIGNIFICANT CHANGE UP (ref 6–8.3)
RBC # BLD: 4.2 M/UL — SIGNIFICANT CHANGE UP (ref 4.2–5.8)
RBC # FLD: 14.7 % — HIGH (ref 10.3–14.5)
S AUREUS DNA NOSE QL NAA+PROBE: SIGNIFICANT CHANGE UP
SODIUM SERPL-SCNC: 133 MMOL/L — LOW (ref 135–145)
TSH SERPL-MCNC: 0.85 UIU/ML — SIGNIFICANT CHANGE UP (ref 0.27–4.2)
WBC # BLD: 3.61 K/UL — LOW (ref 3.8–10.5)
WBC # FLD AUTO: 3.61 K/UL — LOW (ref 3.8–10.5)

## 2024-10-12 PROCEDURE — 72040 X-RAY EXAM NECK SPINE 2-3 VW: CPT | Mod: 26

## 2024-10-12 RX ADMIN — LIDOCAINE 1 PATCH: 50 CREAM TOPICAL at 05:50

## 2024-10-12 RX ADMIN — Medication 0.4 MILLIGRAM(S): at 21:39

## 2024-10-12 RX ADMIN — LABETALOL HYDROCHLORIDE 200 MILLIGRAM(S): 200 TABLET, FILM COATED ORAL at 17:11

## 2024-10-12 RX ADMIN — LABETALOL HYDROCHLORIDE 200 MILLIGRAM(S): 200 TABLET, FILM COATED ORAL at 05:56

## 2024-10-12 RX ADMIN — LIDOCAINE 1 PATCH: 50 CREAM TOPICAL at 19:30

## 2024-10-12 RX ADMIN — Medication 10 MILLIGRAM(S): at 17:12

## 2024-10-12 RX ADMIN — Medication 1 TABLET(S): at 05:55

## 2024-10-12 RX ADMIN — Medication 1 TABLET(S): at 17:11

## 2024-10-12 RX ADMIN — LIDOCAINE 1 PATCH: 50 CREAM TOPICAL at 17:12

## 2024-10-12 RX ADMIN — Medication 50 MICROGRAM(S): at 05:55

## 2024-10-12 RX ADMIN — Medication 1: at 22:29

## 2024-10-12 RX ADMIN — Medication 10 MILLIGRAM(S): at 05:55

## 2024-10-12 NOTE — PROGRESS NOTE ADULT - PROBLEM SELECTOR PLAN 1
-pt reporting 1 day weakness i/s/o recent IVIG administration and haldol IM administration causing patient to have difficulty with ambulation. (recently visited both immunology clinic and psyc clinic prior to fall)  -no motor deficits on neuro exam however pt did have fall in bathroom w/o head involvement  -c/w lidocaine patch daily, tylenol PRN for pain and cold compresses  -EKG showing NSR, QTC normal limits 417  -PT consult  -fall precautions  - prior recent TTE normal LV, no need to repeat

## 2024-10-12 NOTE — PATIENT PROFILE ADULT - STATED REASON FOR ADMISSION
patient has weakness after IVIG treatment and Haldol IM injection. Patient noted to have fall in ED.

## 2024-10-12 NOTE — PHYSICAL THERAPY INITIAL EVALUATION ADULT - GENERAL OBSERVATIONS, REHAB EVAL
Upon entry, pt semi-supine in bed in NAD. HR 86 bpm. Sisters present at bedside. Pt left as received with all tubes/lines intact, bed alarm on, call bell in reach and in NAD.

## 2024-10-12 NOTE — PATIENT PROFILE ADULT - FALL HARM RISK - HARM RISK INTERVENTIONS

## 2024-10-12 NOTE — PHYSICAL THERAPY INITIAL EVALUATION ADULT - GAIT DEVIATIONS NOTED, PT EVAL
downward gaze/decreased joão/decreased velocity of limb motion/decreased step length/decreased stride length

## 2024-10-13 VITALS
SYSTOLIC BLOOD PRESSURE: 128 MMHG | RESPIRATION RATE: 18 BRPM | HEART RATE: 65 BPM | OXYGEN SATURATION: 98 % | TEMPERATURE: 98 F | DIASTOLIC BLOOD PRESSURE: 72 MMHG

## 2024-10-13 LAB
ANION GAP SERPL CALC-SCNC: 13 MMOL/L — SIGNIFICANT CHANGE UP (ref 7–14)
ANION GAP SERPL CALC-SCNC: 14 MMOL/L — SIGNIFICANT CHANGE UP (ref 7–14)
BASOPHILS # BLD AUTO: 0.02 K/UL — SIGNIFICANT CHANGE UP (ref 0–0.2)
BASOPHILS NFR BLD AUTO: 0.6 % — SIGNIFICANT CHANGE UP (ref 0–2)
BUN SERPL-MCNC: 12 MG/DL — SIGNIFICANT CHANGE UP (ref 7–23)
BUN SERPL-MCNC: 14 MG/DL — SIGNIFICANT CHANGE UP (ref 7–23)
CALCIUM SERPL-MCNC: 9 MG/DL — SIGNIFICANT CHANGE UP (ref 8.4–10.5)
CALCIUM SERPL-MCNC: 9.1 MG/DL — SIGNIFICANT CHANGE UP (ref 8.4–10.5)
CHLORIDE SERPL-SCNC: 96 MMOL/L — LOW (ref 98–107)
CHLORIDE SERPL-SCNC: 97 MMOL/L — LOW (ref 98–107)
CO2 SERPL-SCNC: 17 MMOL/L — LOW (ref 22–31)
CO2 SERPL-SCNC: 22 MMOL/L — SIGNIFICANT CHANGE UP (ref 22–31)
CREAT SERPL-MCNC: 0.69 MG/DL — SIGNIFICANT CHANGE UP (ref 0.5–1.3)
CREAT SERPL-MCNC: 0.88 MG/DL — SIGNIFICANT CHANGE UP (ref 0.5–1.3)
CULTURE RESULTS: NO GROWTH — SIGNIFICANT CHANGE UP
EGFR: 101 ML/MIN/1.73M2 — SIGNIFICANT CHANGE UP
EGFR: 109 ML/MIN/1.73M2 — SIGNIFICANT CHANGE UP
EOSINOPHIL # BLD AUTO: 0.07 K/UL — SIGNIFICANT CHANGE UP (ref 0–0.5)
EOSINOPHIL NFR BLD AUTO: 2.3 % — SIGNIFICANT CHANGE UP (ref 0–6)
GLUCOSE SERPL-MCNC: 103 MG/DL — HIGH (ref 70–99)
GLUCOSE SERPL-MCNC: 87 MG/DL — SIGNIFICANT CHANGE UP (ref 70–99)
HCT VFR BLD CALC: 36 % — LOW (ref 39–50)
HGB BLD-MCNC: 12.3 G/DL — LOW (ref 13–17)
IANC: 1.67 K/UL — LOW (ref 1.8–7.4)
IMM GRANULOCYTES NFR BLD AUTO: 0.3 % — SIGNIFICANT CHANGE UP (ref 0–0.9)
LYMPHOCYTES # BLD AUTO: 0.81 K/UL — LOW (ref 1–3.3)
LYMPHOCYTES # BLD AUTO: 26 % — SIGNIFICANT CHANGE UP (ref 13–44)
MAGNESIUM SERPL-MCNC: 2.1 MG/DL — SIGNIFICANT CHANGE UP (ref 1.6–2.6)
MAGNESIUM SERPL-MCNC: 2.2 MG/DL — SIGNIFICANT CHANGE UP (ref 1.6–2.6)
MCHC RBC-ENTMCNC: 29 PG — SIGNIFICANT CHANGE UP (ref 27–34)
MCHC RBC-ENTMCNC: 34.2 GM/DL — SIGNIFICANT CHANGE UP (ref 32–36)
MCV RBC AUTO: 84.9 FL — SIGNIFICANT CHANGE UP (ref 80–100)
MONOCYTES # BLD AUTO: 0.53 K/UL — SIGNIFICANT CHANGE UP (ref 0–0.9)
MONOCYTES NFR BLD AUTO: 17 % — HIGH (ref 2–14)
NEUTROPHILS # BLD AUTO: 1.67 K/UL — LOW (ref 1.8–7.4)
NEUTROPHILS NFR BLD AUTO: 53.8 % — SIGNIFICANT CHANGE UP (ref 43–77)
NRBC # BLD: 0 /100 WBCS — SIGNIFICANT CHANGE UP (ref 0–0)
NRBC # FLD: 0 K/UL — SIGNIFICANT CHANGE UP (ref 0–0)
PHOSPHATE SERPL-MCNC: 3.3 MG/DL — SIGNIFICANT CHANGE UP (ref 2.5–4.5)
PHOSPHATE SERPL-MCNC: 3.8 MG/DL — SIGNIFICANT CHANGE UP (ref 2.5–4.5)
PLATELET # BLD AUTO: 171 K/UL — SIGNIFICANT CHANGE UP (ref 150–400)
POTASSIUM SERPL-MCNC: 4.4 MMOL/L — SIGNIFICANT CHANGE UP (ref 3.5–5.3)
POTASSIUM SERPL-MCNC: 4.6 MMOL/L — SIGNIFICANT CHANGE UP (ref 3.5–5.3)
POTASSIUM SERPL-SCNC: 4.4 MMOL/L — SIGNIFICANT CHANGE UP (ref 3.5–5.3)
POTASSIUM SERPL-SCNC: 4.6 MMOL/L — SIGNIFICANT CHANGE UP (ref 3.5–5.3)
RBC # BLD: 4.24 M/UL — SIGNIFICANT CHANGE UP (ref 4.2–5.8)
RBC # FLD: 14.6 % — HIGH (ref 10.3–14.5)
SODIUM SERPL-SCNC: 128 MMOL/L — LOW (ref 135–145)
SODIUM SERPL-SCNC: 131 MMOL/L — LOW (ref 135–145)
[facility] SOURCE: SIGNIFICANT CHANGE UP
WBC # BLD: 3.11 K/UL — LOW (ref 3.8–10.5)
WBC # FLD AUTO: 3.11 K/UL — LOW (ref 3.8–10.5)

## 2024-10-13 PROCEDURE — 93010 ELECTROCARDIOGRAM REPORT: CPT

## 2024-10-13 RX ORDER — SODIUM CHLORIDE 0.9 % (FLUSH) 0.9 %
1 SYRINGE (ML) INJECTION
Refills: 0 | Status: DISCONTINUED | OUTPATIENT
Start: 2024-10-13 | End: 2024-10-13

## 2024-10-13 RX ORDER — LIDOCAINE 50 MG/G
1 CREAM TOPICAL
Qty: 30 | Refills: 0
Start: 2024-10-13 | End: 2024-11-11

## 2024-10-13 RX ORDER — SODIUM CHLORIDE 0.9 % (FLUSH) 0.9 %
1 SYRINGE (ML) INJECTION ONCE
Refills: 0 | Status: COMPLETED | OUTPATIENT
Start: 2024-10-13 | End: 2024-10-13

## 2024-10-13 RX ORDER — ACETAMINOPHEN 325 MG
2 TABLET ORAL
Qty: 0 | Refills: 0 | DISCHARGE
Start: 2024-10-13

## 2024-10-13 RX ORDER — SODIUM CHLORIDE 0.9 % (FLUSH) 0.9 %
1 SYRINGE (ML) INJECTION
Qty: 10 | Refills: 0
Start: 2024-10-13 | End: 2024-10-17

## 2024-10-13 RX ORDER — SODIUM CHLORIDE 0.9 % (FLUSH) 0.9 %
1 SYRINGE (ML) INJECTION
Qty: 60 | Refills: 0
Start: 2024-10-13 | End: 2024-11-11

## 2024-10-13 RX ADMIN — LABETALOL HYDROCHLORIDE 200 MILLIGRAM(S): 200 TABLET, FILM COATED ORAL at 06:23

## 2024-10-13 RX ADMIN — Medication 1 TABLET(S): at 06:23

## 2024-10-13 RX ADMIN — Medication 10 MILLIGRAM(S): at 06:23

## 2024-10-13 RX ADMIN — Medication 1 GRAM(S): at 08:57

## 2024-10-13 RX ADMIN — Medication 50 MICROGRAM(S): at 06:23

## 2024-10-13 RX ADMIN — LIDOCAINE 1 PATCH: 50 CREAM TOPICAL at 05:26

## 2024-10-13 RX ADMIN — Medication 10 MILLIGRAM(S): at 11:41

## 2024-10-13 RX ADMIN — LIDOCAINE 1 PATCH: 50 CREAM TOPICAL at 11:40

## 2024-10-13 NOTE — PROGRESS NOTE ADULT - PROBLEM SELECTOR PLAN 1
-pt reporting 1 day weakness i/s/o recent IVIG administration and haldol IM administration causing patient to have difficulty with ambulation. (recently visited both immunology clinic and psyc clinic prior to fall)  -no motor deficits on neuro exam however pt did have fall in bathroom w/o head involvement  -c/w lidocaine patch daily, tylenol PRN for pain and cold compresses  -EKG showing NSR, QTC normal limits 417  -PT consult: no skills need  -fall precautions  - prior recent TTE normal LV, no need to repeat

## 2024-10-13 NOTE — DISCHARGE NOTE NURSING/CASE MANAGEMENT/SOCIAL WORK - NSDCVIVACCINE_GEN_ALL_CORE_FT
Influenza, split virus, trivalent, preservative free; 21-Sep-2024 12:42; Dedra Freeman); [employer]; [id number] (Exp. Date: 30-Jun-2025); IntraMuscular; Deltoid Left.; 0.5 milliLiter(s); VIS (VIS Published: 06-Aug-2021, VIS Presented: 21-Sep-2024);

## 2024-10-13 NOTE — PROGRESS NOTE ADULT - SUBJECTIVE AND OBJECTIVE BOX
SUBJECTIVE/ OVERNIGHT EVENTS:  feels well  comfortable  back to baseline  minimal neck discomfort post fall, requests cervical xray  the two sisters Susannah and Brittany at bedside.  would like to take him home if possible  no cp, no sob, no n/v/d. no abdominal pain.  no headache, no dizziness.   no dysuria, no urinary urgency/frequency. no bowel/bladder incontinence.   Pt denied SI/HI ideations, denied visual and auditory hallucinations.     --------------------------------------------------------------------------------------------  LABS:                        12.2   3.61  )-----------( 190      ( 12 Oct 2024 06:54 )             35.9     10-12    133[L]  |  98  |  13  ----------------------------<  91  3.9   |  23  |  0.77    Ca    9.4      12 Oct 2024 06:54  Phos  2.9     10-12  Mg     2.20     10-12    TPro  7.7  /  Alb  4.1  /  TBili  0.4  /  DBili  x   /  AST  16  /  ALT  13  /  AlkPhos  116  10-12      CAPILLARY BLOOD GLUCOSE      POCT Blood Glucose.: 120 mg/dL (12 Oct 2024 07:30)  POCT Blood Glucose.: 99 mg/dL (11 Oct 2024 22:29)  POCT Blood Glucose.: 122 mg/dL (11 Oct 2024 11:24)  POCT Blood Glucose.: 153 mg/dL (11 Oct 2024 11:14)        Urinalysis Basic - ( 12 Oct 2024 06:54 )    Color: x / Appearance: x / SG: x / pH: x  Gluc: 91 mg/dL / Ketone: x  / Bili: x / Urobili: x   Blood: x / Protein: x / Nitrite: x   Leuk Esterase: x / RBC: x / WBC x   Sq Epi: x / Non Sq Epi: x / Bacteria: x        RADIOLOGY & ADDITIONAL TESTS:    Imaging Personally Reviewed:  [x] YES  [ ] NO    Consultant(s) Notes Reviewed:  [x] YES  [ ] NO    MEDICATIONS  (STANDING):  amLODIPine   Tablet 10 milliGRAM(s) Oral daily  amoxicillin  875 milliGRAM(s)/clavulanate 1 Tablet(s) Oral two times a day  dextrose 5%. 1000 milliLiter(s) (100 mL/Hr) IV Continuous <Continuous>  dextrose 5%. 1000 milliLiter(s) (50 mL/Hr) IV Continuous <Continuous>  dextrose 50% Injectable 25 Gram(s) IV Push once  dextrose 50% Injectable 12.5 Gram(s) IV Push once  dextrose 50% Injectable 25 Gram(s) IV Push once  escitalopram 10 milliGRAM(s) Oral daily  glucagon  Injectable 1 milliGRAM(s) IntraMuscular once  insulin lispro (ADMELOG) corrective regimen sliding scale   SubCutaneous Before meals and at bedtime  labetalol 200 milliGRAM(s) Oral two times a day  levothyroxine 50 MICROGram(s) Oral daily  lidocaine   4% Patch 1 Patch Transdermal daily  tamsulosin 0.4 milliGRAM(s) Oral at bedtime    MEDICATIONS  (PRN):  acetaminophen     Tablet .. 650 milliGRAM(s) Oral every 6 hours PRN Temp greater or equal to 38C (100.4F), Mild Pain (1 - 3)  dextrose Oral Gel 15 Gram(s) Oral once PRN Blood Glucose LESS THAN 70 milliGRAM(s)/deciliter      Care Discussed with Consultants/Other Providers [x] YES  [ ] NO    Vital Signs Last 24 Hrs  T(C): 36.6 (12 Oct 2024 06:00), Max: 36.9 (11 Oct 2024 11:08)  T(F): 97.8 (12 Oct 2024 06:00), Max: 98.4 (11 Oct 2024 11:08)  HR: 80 (12 Oct 2024 06:00) (61 - 96)  BP: 159/88 (12 Oct 2024 06:00) (126/71 - 174/87)  BP(mean): --  RR: 18 (12 Oct 2024 06:00) (14 - 18)  SpO2: 99% (12 Oct 2024 06:00) (95% - 100%)    Parameters below as of 12 Oct 2024 06:00  Patient On (Oxygen Delivery Method): room air      I&O's Summary      PHYSICAL EXAM:  GENERAL: NAD, well-developed, comfortable  HEAD:  Atraumatic, Normocephalic  EYES: EOMI, PERRLA, conjunctiva and sclera clear  NECK: Supple, No JVD, no spot tenderness, normal ROM  CHEST/LUNG: Clear to auscultation bilaterally; No wheeze  HEART: Regular rate and rhythm; No murmurs, rubs, or gallops  ABDOMEN: Soft, Nontender, Nondistended; Bowel sounds present  Neuro: AAOx3, no focal weakness, 5/5 b/l extremity strength  EXTREMITIES:  2+ Peripheral Pulses, No clubbing, cyanosis, or edema  SKIN: No rashes or lesions   PSYC: appropriate mood. 
SUBJECTIVE/ OVERNIGHT EVENTS:  feels well  Na 128, slightly lowered  home salt tabs restarted  no cp, no sob, no n/v/d. no abdominal pain.  no headache, no dizziness.   wants to go home.       --------------------------------------------------------------------------------------------  LABS:                        12.3   3.11  )-----------( 171      ( 13 Oct 2024 06:26 )             36.0     10-13    128[L]  |  97[L]  |  12  ----------------------------<  87  4.4   |  17[L]  |  0.69    Ca    9.0      13 Oct 2024 06:26  Phos  3.8     10-13  Mg     2.20     10-13    TPro  7.7  /  Alb  4.1  /  TBili  0.4  /  DBili  x   /  AST  16  /  ALT  13  /  AlkPhos  116  10-12      CAPILLARY BLOOD GLUCOSE      POCT Blood Glucose.: 109 mg/dL (13 Oct 2024 07:18)  POCT Blood Glucose.: 166 mg/dL (12 Oct 2024 22:24)  POCT Blood Glucose.: 114 mg/dL (12 Oct 2024 16:56)  POCT Blood Glucose.: 136 mg/dL (12 Oct 2024 11:32)        Urinalysis Basic - ( 13 Oct 2024 06:26 )    Color: x / Appearance: x / SG: x / pH: x  Gluc: 87 mg/dL / Ketone: x  / Bili: x / Urobili: x   Blood: x / Protein: x / Nitrite: x   Leuk Esterase: x / RBC: x / WBC x   Sq Epi: x / Non Sq Epi: x / Bacteria: x        RADIOLOGY & ADDITIONAL TESTS:    Imaging Personally Reviewed:  [x] YES  [ ] NO    Consultant(s) Notes Reviewed:  [x] YES  [ ] NO    MEDICATIONS  (STANDING):  amLODIPine   Tablet 10 milliGRAM(s) Oral daily  amoxicillin  875 milliGRAM(s)/clavulanate 1 Tablet(s) Oral two times a day  dextrose 5%. 1000 milliLiter(s) (100 mL/Hr) IV Continuous <Continuous>  dextrose 5%. 1000 milliLiter(s) (50 mL/Hr) IV Continuous <Continuous>  dextrose 50% Injectable 25 Gram(s) IV Push once  dextrose 50% Injectable 12.5 Gram(s) IV Push once  dextrose 50% Injectable 25 Gram(s) IV Push once  escitalopram 10 milliGRAM(s) Oral daily  glucagon  Injectable 1 milliGRAM(s) IntraMuscular once  insulin lispro (ADMELOG) corrective regimen sliding scale   SubCutaneous Before meals and at bedtime  labetalol 200 milliGRAM(s) Oral two times a day  levothyroxine 50 MICROGram(s) Oral daily  lidocaine   4% Patch 1 Patch Transdermal daily  sodium chloride 1 Gram(s) Oral two times a day  tamsulosin 0.4 milliGRAM(s) Oral at bedtime    MEDICATIONS  (PRN):  acetaminophen     Tablet .. 650 milliGRAM(s) Oral every 6 hours PRN Temp greater or equal to 38C (100.4F), Mild Pain (1 - 3)  dextrose Oral Gel 15 Gram(s) Oral once PRN Blood Glucose LESS THAN 70 milliGRAM(s)/deciliter      Care Discussed with Consultants/Other Providers [x] YES  [ ] NO    Vital Signs Last 24 Hrs  T(C): 36.9 (13 Oct 2024 05:30), Max: 36.9 (13 Oct 2024 05:30)  T(F): 98.4 (13 Oct 2024 05:30), Max: 98.4 (13 Oct 2024 05:30)  HR: 68 (13 Oct 2024 05:30) (67 - 78)  BP: 142/86 (13 Oct 2024 05:30) (124/86 - 142/86)  BP(mean): --  RR: 18 (13 Oct 2024 05:30) (18 - 18)  SpO2: 99% (13 Oct 2024 05:30) (98% - 99%)    Parameters below as of 13 Oct 2024 05:30  Patient On (Oxygen Delivery Method): room air      I&O's Summary        PHYSICAL EXAM:  GENERAL: NAD, well-developed, comfortable  HEAD:  Atraumatic, Normocephalic  EYES: EOMI, PERRLA, conjunctiva and sclera clear  NECK: Supple, No JVD, no spot tenderness, normal ROM  CHEST/LUNG: Clear to auscultation bilaterally; No wheeze  HEART: Regular rate and rhythm; No murmurs, rubs, or gallops  ABDOMEN: Soft, Nontender, Nondistended; Bowel sounds present  Neuro: AAOx3, no focal weakness, 5/5 b/l extremity strength  EXTREMITIES:  2+ Peripheral Pulses, No clubbing, cyanosis, or edema  SKIN: No rashes or lesions   PSYC: appropriate mood.

## 2024-10-13 NOTE — PROGRESS NOTE ADULT - PROBLEM SELECTOR PLAN 7
-s/p IVIG infusion 10/10  -follows with Dr. Boxer outpatient  -monitor for fever
-s/p IVIG infusion 10/10  -follows with Dr. Boxer outpatient  -monitor for fever

## 2024-10-13 NOTE — DISCHARGE NOTE PROVIDER - CARE PROVIDER_API CALL
Ketan Mcclendon  Internal Medicine  [address] [place] [address], [facility] [address]  [place] [address]  Phone: [phone]  Fax: [phone]  Follow Up Time:

## 2024-10-13 NOTE — DISCHARGE NOTE NURSING/CASE MANAGEMENT/SOCIAL WORK - PATIENT PORTAL LINK FT
You can access the FollowMyHealth Patient Portal offered by [facility] by registering at the following website: [email or web address]. By joining [employer]’s FollowMyHealth portal, you will also be able to view your health information using other applications (apps) compatible with our system.

## 2024-10-13 NOTE — PROGRESS NOTE ADULT - PROBLEM SELECTOR PLAN 2
-pt. with hx hyponatremia i/s/o psychogenic polydipsia  -on 1.5L fluid restriction while inpatient previously, continued outpatient  -Na 131 on labs in ED, patient mental status at baseline  -c/w fluid restriction  - he is no longer on salt tabs. Na 128 so restarted.   - can repeat Na after lunch and salt tabs, if repeats stable, can go home with outpt follow up.
-pt. with hx hyponatremia i/s/o psychogenic polydipsia  -on 1.5L fluid restriction while inpatient previously, continued outpatient  -Na 131 on labs in ED, patient mental status at baseline  -c/w fluid restriction  - he is no longer on salt tabs.  - Na stable

## 2024-10-13 NOTE — DISCHARGE NOTE NURSING/CASE MANAGEMENT/SOCIAL WORK - NSDCPEFALRISK_GEN_ALL_CORE
For information on Fall & Injury Prevention, visit: [email or web address] OR  [email or web address] OR  https://www.cdc.gov/steadi/patient.html

## 2024-10-13 NOTE — DISCHARGE NOTE PROVIDER - NSDCCPCAREPLAN_GEN_ALL_CORE_FT
PRINCIPAL DISCHARGE DIAGNOSIS  Diagnosis: Generalized weakness  Assessment and Plan of Treatment: Pt cleared by PT for discharge with no needs for therapy  Xray of cervical spine negative for fractures   EKG NSR   COntinue with lidocaine patch daily and prn tylenol as needed      SECONDARY DISCHARGE DIAGNOSES  Diagnosis: Chronic hyponatremia  Assessment and Plan of Treatment: Sodium improved to 131 today   Continue with salt tabs 2x day for 5 days   Restrict your fluids to 1.5L /day to raise the Sodium  Follow up with your medical doctor to monitor your sodium    Diagnosis: HTN (hypertension)  Assessment and Plan of Treatment: Continue blood pressure medication regimen as directed. Monitor for any visual changes, headaches or dizziness.  Monitor blood pressure regularly.  Follow up with your PCP for further management for high blood pressure.      Diagnosis: Diabetes mellitus  Assessment and Plan of Treatment: Continue diet control with COnsistant carbohydrate diet   Metformin as directed   Exercise as tolerated    Diagnosis: Hypothyroid  Assessment and Plan of Treatment: Continue synthroid as directed   Monitor thyroid levels regularly with your medical doctor    Diagnosis: Furuncle of axilla  Assessment and Plan of Treatment: Finish course of antibiotics for a 10 day total from start of medication   Keep wound covered and change daily if wound is draining   Follow up with MD you saw if wound does not improved    Diagnosis: CVID (common variable immunodeficiency)  Assessment and Plan of Treatment: Follow up with Dr. Boxer as direxted    Diagnosis: Schizoaffective disorder  Assessment and Plan of Treatment: Continue with medication as prescribed   Follow up with outpatient psychiatrist as directed

## 2024-10-13 NOTE — PROGRESS NOTE ADULT - PROBLEM SELECTOR PLAN 5
-pt with hx MRSA skin infections including previous prolonged hospital stay due to sepsis 2/2 infection'  -physical exam notable for furuncles on axillae bilaterally, L>R, some open and mildly weeping  (less than 0.5 cm)  -no redness/erythema concerning for underlying cellulitis, patient afebrile  -c/w augmentin prescribed by outpatient PCP/ immunology. (tentatively 10 days course as prescribed)  -MRSA swab  -contact precautions.  -monitor for fevers - if febrile can give tylenol and convert to IV vancomycin and obtain blood cultures
-pt with hx MRSA skin infections including previous prolonged hospital stay due to sepsis 2/2 infection'  -physical exam notable for furuncles on axillae bilaterally, L>R, some open and mildly weeping  (less than 0.5 cm)  -no redness/erythema concerning for underlying cellulitis, patient afebrile  -c/w augmentin prescribed by outpatient PCP/ immunology. (tentatively 10 days course as prescribed)  -MRSA swab  -contact precautions.  -monitor for fevers - if febrile can give tylenol and convert to IV vancomycin and obtain blood cultures

## 2024-10-13 NOTE — PROGRESS NOTE ADULT - NSPROGADDITIONALINFOA_GEN_ALL_CORE
Dispo: pending PT, cervical xray, orthostatic, check EKG (qtc) on haldol. Social work.     DC Planning today if he feels well and if above neg.    d/w the two sisters at bedside.  d/w BRITANY Miller.    - Dr. JAKE Morales ([facility])  - [phone]
Dispo: check EKG (qtc) on haldol. Social work f/u.     DC Planning if he feels well and if Na stable.   resume salt tabs.     d/w BRITANY Nichols.     - Dr. JAKE Morales ([facility])  - [phone]

## 2024-10-13 NOTE — PROGRESS NOTE ADULT - PROBLEM SELECTOR PLAN 8
-pt on metformin outpatient  -LDISS and fingersticks with meals and at bedtime while inpatient.
-pt on metformin outpatient  -LDISS and fingersticks with meals and at bedtime while inpatient.

## 2024-10-13 NOTE — PROGRESS NOTE ADULT - PROBLEM SELECTOR PLAN 4
-pt with hx schizoaffective disorder w/ bipolar type, s/p recent admission for labetalol overdose with resulting [facility] inpatient admission  -pt. not displaying any psychiatric symptoms at this time, is otherwise cooperative and with normal affect  -s/p haldol IM injection outpatient  -c/w escitalopram 10 qd  -if patient endorses new psych complaints would consult psych CL
-pt with hx schizoaffective disorder w/ bipolar type, s/p recent admission for labetalol overdose with resulting [facility] inpatient admission  -pt. not displaying any psychiatric symptoms at this time, is otherwise cooperative and with normal affect  -s/p haldol IM injection outpatient  -c/w escitalopram 10 qd  -if patient endorses new psych complaints would consult psych CL

## 2024-10-13 NOTE — PROGRESS NOTE ADULT - ASSESSMENT
57 yo M with PMH of schizoaffective disorder (bipolar type, w/ multiple [facility] admissions), HTN, HLD, hypothyroidism, MRSA skin infection, CVID/ hypogammaglobulinemia (on monthly IVIG, last on 10/10/24) who presents with 1 day history of generalized weakness.
59 yo M with PMH of schizoaffective disorder (bipolar type, w/ multiple [facility] admissions), HTN, HLD, hypothyroidism, MRSA skin infection, CVID/ hypogammaglobulinemia (on monthly IVIG, last on 10/10/24) who presents with 1 day history of generalized weakness.

## 2024-10-13 NOTE — DISCHARGE NOTE NURSING/CASE MANAGEMENT/SOCIAL WORK - NSFLUVACAGEDISCH_IMM_ALL_CORE
"DAILY TREATMENT NOTE    DATE: 5/10/2018    Start Time:  805  Stop Time:  900    PROCEDURES:    TIMED  Procedure Min.   Therex supervised 25   Therx 30                 UNTIMED  Procedure Min.   Bike 5         Total Timed Minutes:  30  Total Timed Units:  2  Total Untimed Units:  0  Charges Billed/# of units:  2 TE      Progress/Current Status    Subjective:     Patient ID: Gertrude Callaway is a 34 y.o. female.  Diagnosis:   1. Chronic pain of right knee     2. Posture abnormality     3. Decreased strength     4. Impaired gait and mobility       Pain: 1 /10  Reports decreased pain this am, but at end of work day yesterday it was "5/10".    Objective:       Pt performed supervised TE x 25 including bike, followed by 1:1 with PTA per log below  x 30 mins.    Date 5/10/18 5/8/18 5/4/18 5/2/18 4/26/18 4/23/18 4/20/18 4/18/18   Visit  Exp 12/31/18 7/50 6/50 6/50 5/50 4/50 3/50 2/50 1/50   POC 6/18/18            FOTO 8/10 7/10 6/10  DONE 5/5  DONE 4/5 3/5 2/5 1/5        NT        Bike 5' 5' 5  5' OOT Next                   MHP    -- --                    MT    -- -- 12' 10' 12'                Stretches            Supine HS 30"x3 B 3x30'' B 2x30'' B 2x30'' B 2x30" B 3x30" L 3x30'' B     Supine ITB 30"x3 B NT 2x30'' B 2x30'' B 2x30" B 3x30'' R 3x30'' R x30" R   Piriformis 30"x3 B 2x30'' B 2x30'' B 2x30'' B 2x30" B 3x30'' R 3x30'' R     Darren  30"x3 B NT 2x30'' B 2x30'' B 2x30" R 3x30" R     Gastroc Stretch - Fitter  W/ HSS 2x30'' B 2x30'' B 2x30 B       Supine:            TAs 5"x15 w/kegel 5''x10 w/ kegel 5"x15 5''x15 5"x15 5"x15 Next ?     Abdominal isometric     -- -- 5"x10 fwd/side     Dead Bug    -- -- 2x5 B, knee extension/hip neutral over mat Next ?     Bridge  2x15 DL 2x15 DL 2x15 DL  Min LBP 2x15 DL  Min LBP 2x15 DL   w/hip add 2x15 DL w/ hip add 2x10 w/ hip add     Self met for anterior rotated innominate correction    -- --  -- HEP   Hip add/abd self met for pelvic alignment    -- --  -- HEP   SLR w/ hip ER 2x15 B 2x15 " "B 2x10 B 2x10 B 2x10 B 2x10 B 2x10     SL hip abd 1# 2x10 B 2x10 B 1# 2x15 B 2x15 B 2x10 B 2x10 B Next      SL hip add   -- -- 2x10 B x10 B W/ bridge     SL clams 10"x15 RTB 10''x15 RTB 2x10 3'' holds  RTB 2x10 3'' holds  RTB -- 2x10 3'' holds  RTB 2x10 5'' holds  RTB                  Prone:            Alt UE/LE    -- -- Next Next?     Hip ext  NT oot oot 2x10 B *note Next Next?     Hydrants                      Seated:            TAs March            LAQs                         Standing:            TAs     --       Lats     -- OOT 2x10 GTC     Rows     --       Steamboats     --       Cybex knee ext  NT oot oot 10#  2x10       Leg Press 5.0 S=5  3x10 DL S=5, 5.0  2x10 DL oot oot   S=5, 5.0  2x10 DL OOT 2x10 DL  5 plates, bench = 5                    Initials DH 1/6 DILIP DH 1/6 DILIP MNB CC DILIP CC         Assessment:     Patient able to complete all therex without reports of pain or difficulty today.    Patient Education/Response:     Patient educated to continue HEP.  Verbalized understanding.      Plans and Goals:     Continue as paul, progress with right LE stretching and strengthening. Assess aligment of pelvis as able and adjust as needed.     Short Term Goals (4 Weeks): 5/18/18  1. Pt will be compliant with HEP to supplement PT in decreasing pain with functional mobility.  2. Pt will perform and hold TA contraction for 10x10" in dynamic sitting activities to demonstrate improved core strength  3. Pt will improve impaired LE MMTs to >/=4/5 to improve strength for functional tasks.  4. Pt will improve B 9090 HS length >/=15 deg B to improve flexibility for normal movement patterns.   Long Term Goals (8 Weeks): 6/18/18  1. Pt will improve FOTO score to </= 25% limited to decrease perceived limitation with maintaining/changing body position  2. Pt will improve B 9090 HS length to full extension B to improve flexibility for normal movement patterns.   3. Pt will perform and hold TA contraction for 10x10" in " Adult dynamic standing activities to demonstrate improved core strength   4. Pt will improve impaired LE MMTs to >/=4+/5 to improve strength for functional tasks.  5. Pt will report no pain with standing > 1 hr to promote functional QOL.  6. Pt will report no pain with stair ascent promote functional mobility.  7. Pt will demonstrate proper pelvic alignment for >/= 1 week to decrease stresses placed on surrounding structures during functional movement.

## 2024-10-13 NOTE — DISCHARGE NOTE PROVIDER - NSDCMRMEDTOKEN_GEN_ALL_CORE_FT
acetaminophen 325 mg oral tablet: 2 tab(s) orally every 6 hours As needed Temp greater or equal to 38C (100.4F), Mild Pain (1 - 3)  amLODIPine 10 mg oral tablet: 1 tab(s) orally once a day  Augmentin 875 mg-125 mg oral tablet: 1 tab(s) orally 2 times a day Complete full course of prescription  escitalopram 10 mg oral tablet: 1 tab(s) orally once a day  immune globulin intravenous: once a month SD type -- no allergic reaction to SD formulation  labetalol 200 mg oral tablet: 1 tab(s) orally 2 times a day  levothyroxine 50 mcg (0.05 mg) oral tablet: 1 tab(s) orally once a day  lidocaine 4% topical film: Apply topically to affected area once a day  metFORMIN 500 mg oral tablet: 1 tab(s) orally 2 times a day  sodium chloride 1 g oral tablet: 1 tab(s) orally 2 times a day Stop after 5 days  tamsulosin 0.4 mg oral capsule: 1 cap(s) orally once a day (at bedtime)

## 2024-10-13 NOTE — DISCHARGE NOTE PROVIDER - HOSPITAL COURSE
59 yo M with PMH of schizoaffective disorder (bipolar type, w/ multiple [facility] admissions), HTN, HLD, hypothyroidism, MRSA skin infection, CVID/ hypogammaglobulinemia (on monthly IVIG, last on 10/10/24) who presents with 1 day history of generalized weakness.       Problem/Plan - 1:  ·  Problem: Weakness.   ·  Plan: -pt reporting 1 day weakness i/s/o recent IVIG administration and haldol IM administration causing patient to have difficulty with ambulation. (recently visited both immunology clinic and psyc clinic prior to fall)  -no motor deficits on neuro exam however pt did have fall in bathroom w/o head involvement  -c/w lidocaine patch daily, tylenol PRN for pain and cold compresses  -EKG showing NSR, QTC normal limits 417  -PT consult: no skills need     Problem/Plan - 2:  ·  Problem: Chronic hyponatremia.   ·  Plan: -pt. with hx hyponatremia i/s/o psychogenic polydipsia  -on 1.5L fluid restriction while inpatient previously, continued outpatient  -Na 131 on labs in ED, patient mental status at baseline  -c/w fluid restriction  - he is no longer on salt tabs. Na 128 so restarted.   - can repeat Na after lunch and salt tabs, if repeats stable, can go home with outpt follow up.     Problem/Plan - 3:  ·  Problem: HTN (hypertension).   ·  Plan: -c/w home labetalol and amlodipine.     Problem/Plan - 4:  ·  Problem: Schizoaffective disorder.   ·  Plan: -pt with hx schizoaffective disorder w/ bipolar type, s/p recent admission for labetalol overdose with resulting [facility] inpatient admission  -pt. not displaying any psychiatric symptoms at this time, is otherwise cooperative and with normal affect  -s/p haldol IM injection outpatient  -c/w escitalopram 10 qd  -if patient endorses new psych complaints would consult psych CL.     Problem/Plan - 5:  ·  Problem: Furuncle of axilla.   ·  Plan: -pt with hx MRSA skin infections including previous prolonged hospital stay due to sepsis 2/2 infection'  -physical exam notable for furuncles on axillae bilaterally, L>R, some open and mildly weeping  (less than 0.5 cm)  -no redness/erythema concerning for underlying cellulitis, patient afebrile  -c/w augmentin prescribed by outpatient PCP/ immunology. (tentatively 10 days course as prescribed)  -MRSA swab negative   -contact precautions.  -monitor for fevers - if febrile can give tylenol and convert to IV vancomycin and obtain blood cultures.     Problem/Plan - 6:  ·  Problem: Hypothyroid.   ·  Plan: -c/w home levothyroxine 50mcg  -TSH 0.85.     Problem/Plan - 7:  ·  Problem: CVID (common variable immunodeficiency).   ·  Plan: -s/p IVIG infusion 10/10  -follows with Dr. Boxer outpatient  -monitor for fever.     Problem/Plan - 8:  ·  Problem: Diabetes mellitus.   ·  Plan: -pt on metformin outpatient  -LDISS and fingersticks with meals and at bedtime while inpatient.     Problem/Plan - 9:  ·  Problem: Preventive measure.   ·  Plan: Diet: DASH/CC  DVT: holding at this time i/s/o recent fall.    Pt seen by Dr. Morales and Na improved to 131 on afternoon labs Pt is cleared for discharge  Dispo: Home

## 2024-10-13 NOTE — PROGRESS NOTE ADULT - PROBLEM SELECTOR PLAN 9
Diet: DASH/CC  DVT: holding at this time i/s/o recent fall
Diet: DASH/CC  DVT: holding at this time i/s/o recent fall

## 2024-10-13 NOTE — DISCHARGE NOTE PROVIDER - NSDCFUSCHEDAPPT_GEN_ALL_CORE_FT
Boxer, Mitchell B  [facility]  [facility] [address]  Scheduled Appointment: 01/08/2025

## 2024-10-14 PROCEDURE — 90832 PSYTX W PT 30 MINUTES: CPT

## 2024-10-21 PROCEDURE — 90832 PSYTX W PT 30 MINUTES: CPT

## 2024-10-23 ENCOUNTER — EMERGENCY (EMERGENCY)
Facility: HOSPITAL | Age: 59
LOS: 1 days | Discharge: HOME | End: 2024-10-23
Admitting: STUDENT IN AN ORGANIZED HEALTH CARE EDUCATION/TRAINING PROGRAM
Payer: MEDICARE

## 2024-10-23 VITALS
OXYGEN SATURATION: 99 % | RESPIRATION RATE: 18 BRPM | HEIGHT: 74 IN | SYSTOLIC BLOOD PRESSURE: 145 MMHG | TEMPERATURE: 98 F | HEART RATE: 63 BPM | DIASTOLIC BLOOD PRESSURE: 84 MMHG

## 2024-10-23 VITALS
SYSTOLIC BLOOD PRESSURE: 123 MMHG | OXYGEN SATURATION: 98 % | TEMPERATURE: 98 F | RESPIRATION RATE: 19 BRPM | DIASTOLIC BLOOD PRESSURE: 76 MMHG | HEART RATE: 64 BPM

## 2024-10-23 DIAGNOSIS — J34.2 DEVIATED NASAL SEPTUM: Chronic | ICD-10-CM

## 2024-10-23 DIAGNOSIS — K08.199 COMPLETE LOSS OF TEETH DUE TO OTHER SPECIFIED CAUSE, UNSPECIFIED CLASS: Chronic | ICD-10-CM

## 2024-10-23 LAB
ALBUMIN SERPL ELPH-MCNC: 4.5 G/DL — SIGNIFICANT CHANGE UP (ref 3.3–5)
ALP SERPL-CCNC: 118 U/L — SIGNIFICANT CHANGE UP (ref 40–120)
ALT FLD-CCNC: 13 U/L — SIGNIFICANT CHANGE UP (ref 4–41)
AMPHET UR-MCNC: NEGATIVE — SIGNIFICANT CHANGE UP
ANION GAP SERPL CALC-SCNC: 10 MMOL/L — SIGNIFICANT CHANGE UP (ref 7–14)
APAP SERPL-MCNC: <10 UG/ML — LOW (ref 15–25)
APPEARANCE UR: CLEAR — SIGNIFICANT CHANGE UP
AST SERPL-CCNC: 16 U/L — SIGNIFICANT CHANGE UP (ref 4–40)
BARBITURATES UR SCN-MCNC: NEGATIVE — SIGNIFICANT CHANGE UP
BASOPHILS # BLD AUTO: 0.04 K/UL — SIGNIFICANT CHANGE UP (ref 0–0.2)
BASOPHILS NFR BLD AUTO: 0.6 % — SIGNIFICANT CHANGE UP (ref 0–2)
BENZODIAZ UR-MCNC: NEGATIVE — SIGNIFICANT CHANGE UP
BILIRUB SERPL-MCNC: 0.3 MG/DL — SIGNIFICANT CHANGE UP (ref 0.2–1.2)
BILIRUB UR-MCNC: NEGATIVE — SIGNIFICANT CHANGE UP
BUN SERPL-MCNC: 13 MG/DL — SIGNIFICANT CHANGE UP (ref 7–23)
CALCIUM SERPL-MCNC: 9.8 MG/DL — SIGNIFICANT CHANGE UP (ref 8.4–10.5)
CHLORIDE SERPL-SCNC: 96 MMOL/L — LOW (ref 98–107)
CO2 SERPL-SCNC: 27 MMOL/L — SIGNIFICANT CHANGE UP (ref 22–31)
COCAINE METAB.OTHER UR-MCNC: NEGATIVE — SIGNIFICANT CHANGE UP
COLOR SPEC: YELLOW — SIGNIFICANT CHANGE UP
CREAT SERPL-MCNC: 0.8 MG/DL — SIGNIFICANT CHANGE UP (ref 0.5–1.3)
CREATININE URINE RESULT, DAU: 84 MG/DL — SIGNIFICANT CHANGE UP
DIFF PNL FLD: NEGATIVE — SIGNIFICANT CHANGE UP
EGFR: 104 ML/MIN/1.73M2 — SIGNIFICANT CHANGE UP
EGFR: 104 ML/MIN/1.73M2 — SIGNIFICANT CHANGE UP
EOSINOPHIL # BLD AUTO: 0.17 K/UL — SIGNIFICANT CHANGE UP (ref 0–0.5)
EOSINOPHIL NFR BLD AUTO: 2.5 % — SIGNIFICANT CHANGE UP (ref 0–6)
ETHANOL SERPL-MCNC: <10 MG/DL — SIGNIFICANT CHANGE UP
FENTANYL UR QL SCN: NEGATIVE — SIGNIFICANT CHANGE UP
GLUCOSE SERPL-MCNC: 98 MG/DL — SIGNIFICANT CHANGE UP (ref 70–99)
GLUCOSE UR QL: NEGATIVE MG/DL — SIGNIFICANT CHANGE UP
HCT VFR BLD CALC: 38.3 % — LOW (ref 39–50)
HGB BLD-MCNC: 12.8 G/DL — LOW (ref 13–17)
IANC: 4.9 K/UL — SIGNIFICANT CHANGE UP (ref 1.8–7.4)
IMM GRANULOCYTES NFR BLD AUTO: 0.4 % — SIGNIFICANT CHANGE UP (ref 0–0.9)
KETONES UR-MCNC: NEGATIVE MG/DL — SIGNIFICANT CHANGE UP
LEUKOCYTE ESTERASE UR-ACNC: NEGATIVE — SIGNIFICANT CHANGE UP
LYMPHOCYTES # BLD AUTO: 1.14 K/UL — SIGNIFICANT CHANGE UP (ref 1–3.3)
LYMPHOCYTES # BLD AUTO: 16.8 % — SIGNIFICANT CHANGE UP (ref 13–44)
MCHC RBC-ENTMCNC: 29.2 PG — SIGNIFICANT CHANGE UP (ref 27–34)
MCHC RBC-ENTMCNC: 33.4 GM/DL — SIGNIFICANT CHANGE UP (ref 32–36)
MCV RBC AUTO: 87.4 FL — SIGNIFICANT CHANGE UP (ref 80–100)
METHADONE UR-MCNC: NEGATIVE — SIGNIFICANT CHANGE UP
MONOCYTES # BLD AUTO: 0.51 K/UL — SIGNIFICANT CHANGE UP (ref 0–0.9)
MONOCYTES NFR BLD AUTO: 7.5 % — SIGNIFICANT CHANGE UP (ref 2–14)
NEUTROPHILS # BLD AUTO: 4.9 K/UL — SIGNIFICANT CHANGE UP (ref 1.8–7.4)
NEUTROPHILS NFR BLD AUTO: 72.2 % — SIGNIFICANT CHANGE UP (ref 43–77)
NITRITE UR-MCNC: NEGATIVE — SIGNIFICANT CHANGE UP
NRBC # BLD AUTO: 0 K/UL — SIGNIFICANT CHANGE UP (ref 0–0)
NRBC # BLD: 0 /100 WBCS — SIGNIFICANT CHANGE UP (ref 0–0)
NRBC # FLD: 0 K/UL — SIGNIFICANT CHANGE UP (ref 0–0)
NRBC BLD-RTO: 0 /100 WBCS — SIGNIFICANT CHANGE UP (ref 0–0)
OPIATES UR-MCNC: NEGATIVE — SIGNIFICANT CHANGE UP
OXYCODONE UR-MCNC: NEGATIVE — SIGNIFICANT CHANGE UP
PCP SPEC-MCNC: SIGNIFICANT CHANGE UP
PCP UR-MCNC: NEGATIVE — SIGNIFICANT CHANGE UP
PH UR: 7 — SIGNIFICANT CHANGE UP (ref 5–8)
PLATELET # BLD AUTO: 223 K/UL — SIGNIFICANT CHANGE UP (ref 150–400)
POTASSIUM SERPL-MCNC: 4.8 MMOL/L — SIGNIFICANT CHANGE UP (ref 3.5–5.3)
POTASSIUM SERPL-SCNC: 4.8 MMOL/L — SIGNIFICANT CHANGE UP (ref 3.5–5.3)
PROT SERPL-MCNC: 7.7 G/DL — SIGNIFICANT CHANGE UP (ref 6–8.3)
PROT UR-MCNC: SIGNIFICANT CHANGE UP MG/DL
RBC # BLD: 4.38 M/UL — SIGNIFICANT CHANGE UP (ref 4.2–5.8)
RBC # FLD: 14.5 % — SIGNIFICANT CHANGE UP (ref 10.3–14.5)
SALICYLATES SERPL-MCNC: <0.3 MG/DL — LOW (ref 15–30)
SARS-COV-2 RNA SPEC QL NAA+PROBE: SIGNIFICANT CHANGE UP
SODIUM SERPL-SCNC: 133 MMOL/L — LOW (ref 135–145)
SP GR SPEC: 1.01 — SIGNIFICANT CHANGE UP (ref 1–1.03)
THC UR QL: NEGATIVE — SIGNIFICANT CHANGE UP
TOXICOLOGY SCREEN, DRUGS OF ABUSE, SERUM RESULT: SIGNIFICANT CHANGE UP
TSH SERPL-MCNC: 0.74 UIU/ML — SIGNIFICANT CHANGE UP (ref 0.27–4.2)
UROBILINOGEN FLD QL: 0.2 MG/DL — SIGNIFICANT CHANGE UP (ref 0.2–1)
WBC # BLD: 6.79 K/UL — SIGNIFICANT CHANGE UP (ref 3.8–10.5)
WBC # FLD AUTO: 6.79 K/UL — SIGNIFICANT CHANGE UP (ref 3.8–10.5)

## 2024-10-23 PROCEDURE — 99285 EMERGENCY DEPT VISIT HI MDM: CPT

## 2024-10-23 PROCEDURE — 90832 PSYTX W PT 30 MINUTES: CPT

## 2024-10-23 PROCEDURE — 93010 ELECTROCARDIOGRAM REPORT: CPT

## 2024-10-23 PROCEDURE — 99214 OFFICE O/P EST MOD 30 MIN: CPT

## 2024-10-23 PROCEDURE — 90792 PSYCH DIAG EVAL W/MED SRVCS: CPT

## 2024-10-28 PROCEDURE — 90832 PSYTX W PT 30 MINUTES: CPT

## 2024-10-29 PROCEDURE — 99214 OFFICE O/P EST MOD 30 MIN: CPT

## 2024-10-30 LAB — HALOPERIDOL SERPL-MCNC: 8.3 NG/ML — SIGNIFICANT CHANGE UP (ref 1–40)

## 2024-10-30 NOTE — ED ADULT TRIAGE NOTE - HEIGHT IN FEET

## 2024-10-31 PROCEDURE — 90832 PSYTX W PT 30 MINUTES: CPT

## 2024-10-31 NOTE — DIETITIAN INITIAL EVALUATION ADULT - PROBLEM/PLAN-6
Arterial Line Insertion    Performed by: Carlton Campoverde CRNA  Authorized by: London Powell DO  Consent: Verbal consent obtained. Written consent obtained.  Risks and benefits: risks, benefits and alternatives were discussed  Consent given by: patient  Patient identity confirmed: verbally with patient and arm band  Preparation: Patient was prepped and draped in the usual sterile fashion.  Indications: hemodynamic monitoring    Location: radial artery  Procedure Details:  Needle gauge: 20  Seldinger technique: Seldinger technique used  Number of attempts: 1    Post-procedure:  Post-procedure: dressing applied  Waveform: good waveform and waveform confirmed  Post-procedure CNS: normal  Patient tolerance: Patient tolerated the procedure well with no immediate complications          
DISPLAY PLAN FREE TEXT

## 2024-11-05 PROCEDURE — 99214 OFFICE O/P EST MOD 30 MIN: CPT

## 2024-11-06 PROCEDURE — 90832 PSYTX W PT 30 MINUTES: CPT

## 2024-11-07 ENCOUNTER — APPOINTMENT (OUTPATIENT)
Dept: RHEUMATOLOGY | Facility: CLINIC | Age: 59
End: 2024-11-07
Payer: MEDICARE

## 2024-11-07 VITALS
DIASTOLIC BLOOD PRESSURE: 67 MMHG | HEART RATE: 60 BPM | TEMPERATURE: 98 F | RESPIRATION RATE: 16 BRPM | SYSTOLIC BLOOD PRESSURE: 119 MMHG | OXYGEN SATURATION: 96 %

## 2024-11-07 VITALS
RESPIRATION RATE: 16 BRPM | DIASTOLIC BLOOD PRESSURE: 76 MMHG | OXYGEN SATURATION: 98 % | HEART RATE: 58 BPM | SYSTOLIC BLOOD PRESSURE: 124 MMHG

## 2024-11-07 PROCEDURE — 96365 THER/PROPH/DIAG IV INF INIT: CPT

## 2024-11-07 PROCEDURE — 96366 THER/PROPH/DIAG IV INF ADDON: CPT

## 2024-11-07 RX ORDER — CAMPHOR 0.45 %
25 GEL (GRAM) TOPICAL
Qty: 0 | Refills: 0 | Status: COMPLETED
Start: 2024-09-04

## 2024-11-07 RX ORDER — ACETAMINOPHEN 325 MG/1
325 TABLET ORAL
Qty: 0 | Refills: 0 | Status: COMPLETED
Start: 2024-09-04

## 2024-11-07 RX ORDER — IMMUNE GLOBULIN INTRAVENOUS (HUMAN) 10 G
10 KIT INTRAVENOUS
Qty: 0 | Refills: 0 | Status: COMPLETED
Start: 2024-09-04

## 2024-11-08 PROCEDURE — 90832 PSYTX W PT 30 MINUTES: CPT

## 2024-11-12 PROCEDURE — 99214 OFFICE O/P EST MOD 30 MIN: CPT

## 2024-11-12 PROCEDURE — 90832 PSYTX W PT 30 MINUTES: CPT

## 2024-11-13 NOTE — BH PSYCHOLOGY - GROUP THERAPY NOTE - NSBHPSYCHOLGRPNAME_PSY_A_CORE
[facility] Heart and Vascular Cardiology    Patient Name: Deondre Valdez  Patient : 1942      Scribe Attestation  By signing my name below, ICeci Scribe   attest that this documentation has been prepared under the direction and in the presence of Janes Gregory DO.      Reason for visit:  This is an 82-year-old male here for follow-up regarding his history of coronary artery disease with known  of his RCA and collaterals from the left as seen on cardiac catheterization done in , mild aortic valve stenosis, bradycardia/syncope, hypertension, dyslipidemia, and obesity.     HPI:  This is an 82-year-old male here for follow-up regarding his history of coronary artery disease with known  of his RCA and collaterals from the left as seen on cardiac catheterization done in , mild aortic valve stenosis, bradycardia/syncope, hypertension, dyslipidemia, and obesity.  The patient was last evaluated by me in May 2024.  At that visit I restarted lisinopril 5 mg daily, restarted rosuvastatin 5 mg daily, ordered blood work including CMP/lipid/magnesium/CBC to be drawn in 6 months, and asked the patient to follow-up in 6 months and sooner if necessary. ECG done today showed sinus rhythm with a heart rate of 65 bpm.  The patient reports that he has been feeling generally well from the cardiac standpoint. He denies any new chest pain, shortness of breath, palpitations and lightheadedness. He states that he rides his bicycle to stay physically active. He states that his blood pressure at home is in the 130-140s range. He states that he takes all of his medications as prescribed. During my exam, he was resting comfortably on the exam table.            Assessment/Plan:   1. Coronary artery disease  The patient has a history of coronary artery disease with known  of his RCA and collaterals from the left as seen on cardiac catheterization done in .  ECG done today showed sinus rhythm 
with a heart rate of 65 bpm.     He denies anginal chest discomfort.  Blood pressure appears moderately controlled on exam today.   He states that his blood pressure at home is in the 130-140s range.   I will increase lisinopril to 10 mg twice daily.  He should continue low dose aspirin.  Echocardiogram done in April 2022 showed normal left ventricular systolic function with an ejection fraction of 60 to 65%, normal right ventricular systolic function, mild aortic valve stenosis with a mean pressure gradient of 12 mmHg and a dimensionless index of 0.53, trace to mild aortic valve regurgitation.  Lipid panel done in March 2024 showed an LDL cholesterol 106 and triglycerides of 96 while not on any lipid lowering medication.   He is now on rosuvastatin 5 mg daily.   Lab works will be done today and again in 6 months prior to the next visit.   Please see lifestyle recommendations below.  Follow up in 6 months and sooner if necessary.      2. Aortic valve stenosis/regurgitation  The patient has a history of mild aortic valve stenosis/regurgitation.  Echocardiogram done in April 2022 showed normal left ventricular systolic function with an ejection fraction of 60 to 65%, normal right ventricular systolic function, mild aortic valve stenosis with a mean pressure gradient of 12 mmHg and a dimensionless index of 0.53, trace to mild aortic valve regurgitation.  I will continue to monitor this clinically for now.     3. Bradycardia/Syncope  The patient has a history of syncope/bradycardia.  He denies having recurrent syncopal episodes since his last visit.   ECG done today showed sinus rhythm with a heart rate of 65 bpm.      He denies chest pain, palpitations or lightheadedness.   Echocardiogram done in April 2022 showed normal left ventricular systolic function with an ejection fraction of 60 to 65%, normal right ventricular systolic function, mild aortic valve stenosis with a mean pressure gradient of 12 mmHg and a 
CBT (Cognitive Behavioral Therapy) Group
dimensionless index of 0.53, trace to mild aortic valve regurgitation.  Lab works will be done today and again in 6 months prior to the next visit.   Patient should follow general recommendations including staying well-hydrated, changing the positions slowly, wearing compression stockings as necessary, and routine exercise.  Follow up in 6 months and sooner if necessary.      4. Hypertension  The patient has a history of hypertension which appears controlled on exam today.  He states that his blood pressure at home is in the 130-140s range.   I will increase lisinopril to 10 mg twice daily.  He should continue to monitor his blood pressure at home.      5. Dyslipidemia  Lipid panel done in March 2024 showed an LDL cholesterol 106 and triglycerides of 96 while not on any lipid lowering medication.  He is currently on rosuvastatin 5 mg daily.    Lipid panel was ordered as noted below.  Please see lifestyle recommendations below.         Orders:   Increase lisinopril to 10 mg twice daily.  CMP/lipid/magnesium/CBC  BMP/magnesium in 6 months,   Follow-up in 6 months.      Lifestyle Recommendations  I recommend a whole-food plant-based diet, an eating pattern that encourages the consumption of unrefined plant foods (such as fruits, vegetables, tubers, whole grains, legumes, nuts and seeds) and discourages meats, dairy products, eggs and processed foods.     The AHA/ACC recommends that the patient consume a dietary pattern that emphasizes intake of vegetables, fruits, and whole grains; includes low-fat dairy products, poultry, fish, legumes, non-tropical vegetable oils, and nuts; and limits intake of sodium, sweets, sugar-sweetened beverages, and red meats.  Adapt this dietary pattern to appropriate calorie requirements (a 500-750 kcal/day deficit to loose weight), personal and cultural food preferences, and nutrition therapy for other medical conditions (including diabetes).  Achieve this pattern by following plans such 
as the [place] Mediterranean, DASH dietary pattern, or AHA diet.     Engage in 2 hours and 30 minutes per week of moderate-intensity physical activity, or 1 hour and 15 minutes (75 minutes) per week of vigorous-intensity aerobic physical activity, or an equivalent combination of moderate and vigorous-intensity aerobic physical activity. Aerobic activity should be performed in episodes of at least 10 minutes preferably spread throughout the week.     Adhering to a heart healthy diet, regular exercise habits, avoidance of tobacco products, and maintenance of a healthy weight are crucial components of their heart disease risk reduction.     Any positive review of systems not specifically addressed in the office visit today should be evaluated and treated by the patients primary care physician or in an emergency department if necessary     Patient was notified that results from ordered tests will be called to the patient if it changes current management; it will otherwise be discussed at a future appointment and available on  [facility].     Thank you for allowing me to participate in the care of this patient.        This document was generated using the assistance of voice recognition software. If there are any errors of spelling, grammar, syntax, or meaning; please feel free to contact me directly for clarification.    Past Medical History:  He has a past medical history of Personal history of other diseases of the digestive system, Unspecified abdominal hernia without obstruction or gangrene, and Vitamin D deficiency, unspecified.    Past Surgical History:  He has a past surgical history that includes Eye surgery (08/15/2016); Rotator cuff repair (08/15/2016); and Other surgical history (01/19/2020).      Social History:  He reports that he has never smoked. He has never used smokeless tobacco. He reports that he does not drink alcohol and does not use drugs.    Family History:  Family History   Problem Relation Name 
"Age of Onset    Heart disease Father      Lung cancer Maternal Grandmother          Allergies:  Patient has no known allergies.    Outpatient Medications:  Current Outpatient Medications   Medication Instructions    aspirin 81 mg EC tablet 1 tablet, oral, Daily    cholecalciferol (Vitamin D-3) 25 MCG (1000 UT) capsule oral    cyanocobalamin, vitamin B-12, (Vitamin B-12) 1,000 mcg tablet extended release 1 tablet, oral, Daily    ferrous sulfate, 325 mg ferrous sulfate, tablet 1 tablet, oral, Daily    lisinopril 10 mg, oral, Daily    rosuvastatin (Crestor) 5 mg tablet Take one tablet by mouth in the evening.    timolol (Timoptic) 0.5 % ophthalmic solution ophthalmic (eye)        ROS:  A 14 point review of systems was done and is negative other than as stated in HPI    Vitals:      5/26/2022    12:37 PM 11/18/2022     1:58 PM 5/26/2023     3:26 PM 11/16/2023     9:03 AM 3/7/2024     2:01 PM 5/24/2024    10:13 AM 9/13/2024    11:07 AM   Vitals   Systolic 170 138 150 120 142 160 140   Diastolic 74 68 74 64 74 88 86   Heart Rate 68 61  60 51 56 77   Resp  17   16     Height (in) 1.715 m (5' 7.5\") 1.715 m (5' 7.5\")   1.715 m (5' 7.5\") 1.715 m (5' 7.5\") 1.74 m (5' 8.5\")   Weight (lb) 192.5 197 194.8 193 197.8 186 179   BMI 29.7 kg/m2 30.4 kg/m2 30.06 kg/m2 29.78 kg/m2 30.52 kg/m2 28.7 kg/m2 26.82 kg/m2   BSA (m2) 2.04 m2 2.06 m2 2.05 m2 2.04 m2 2.07 m2 2 m2 1.98 m2        Physical Exam:   Constitutional: Cooperative, in no acute distress, alert, appears stated age.   Skin: Skin color, texture, turgor normal. No rashes or lesions.   Head: Normocephalic. No masses, lesions, tenderness or abnormalities   Eyes: Extraocular movements are grossly intact.   Mouth and throat: Mucous membranes moist   Neck: Neck supple, no carotid bruits, no JVD   Respiratory: Lungs clear to auscultation, no wheezing or rhonchi, no use of accessory muscles   Chest wall: No scars, normal excursion with respiration   Cardiovascular: Regular rhythm, "
+murmur  Gastrointestinal: Abdomen soft, nontender. Bowel sounds normal. Obese.  Musculoskeletal: Strength equal in upper extremities   Extremities: Trivial pitting edema   Neurologic: Sensation grossly intact, alert and oriented x3    Intake/Output:   No intake/output data recorded.    Outpatient Medications  Current Outpatient Medications on File Prior to Visit   Medication Sig Dispense Refill    aspirin 81 mg EC tablet Take 1 tablet (81 mg) by mouth once daily.      cholecalciferol (Vitamin D-3) 25 MCG (1000 UT) capsule Take by mouth.      cyanocobalamin, vitamin B-12, (Vitamin B-12) 1,000 mcg tablet extended release Take 1 tablet (1,000 mcg) by mouth once daily.      ferrous sulfate, 325 mg ferrous sulfate, tablet Take 1 tablet (325 mg) by mouth once daily.      lisinopril 10 mg tablet Take 1 tablet (10 mg) by mouth once daily. 100 tablet 1    rosuvastatin (Crestor) 5 mg tablet Take one tablet by mouth in the evening. 90 tablet 3    timolol (Timoptic) 0.5 % ophthalmic solution Administer into affected eye(s).       No current facility-administered medications on file prior to visit.       Labs: (past 26 weeks)  Recent Results (from the past 26 weeks)   POCT glycosylated hemoglobin (Hb A1C) manually resulted    Collection Time: 09/13/24 11:19 AM   Result Value Ref Range    POC HEMOGLOBIN A1c 6.4 4.2 - 6.5 %       ECG  Encounter Date: 05/24/24   ECG 12 Lead    Narrative    Sinus bradycardia heart rate 56 bpm.       Echocardiogram  No results found for this or any previous visit from the past 1095 days.      CV Studies:  EKG:  Encounter Date: 05/24/24   ECG 12 Lead    Narrative    Sinus bradycardia heart rate 56 bpm.     Echocardiogram:   [place]ocardi[place] [address]  Phone [phone] Fax [phone]    TRANSTHORACIC ECHOCARDIOGRAM REPORT      Patient Name:     ROGELIO Villeda Physician:  [id number] Piper Randle MD  Study Date:   
    4/21/2022          Referring           [id number] MÓNICA ALPHONSO GRAYSON  Physician:  MRN/PID:          [id number]           PCP:  Accession/Order#: [id number]          Department  Location:  YOB: 1942          Fellow:  Gender:           M                  Nurse:  Admit Date:       4/19/2022          Sonographer:        Diana Byrne [facility]  Admission Status: Inpatient -        Additional Staff:  Routine  Height:           170.18 cm          CC Report to:  Weight:           84.82 kg           Study Type:         Echocardiogram  BSA:              1.97 m2  Blood Pressure: 106 /55 mmHg    Diagnosis/ICD: R94.31-Abnormal electrocardiogram [ECG] [EKG]  Indication:    Abnormal EKG  Procedure/CPT: Echo Complete w Full Doppler-[id number]  Study Detail: The following Echo studies were performed: 2D, M-Mode, Doppler and  color flow.      PHYSICIAN INTERPRETATION:  Left Ventricle: The left ventricular systolic function is normal, with an estimated ejection fraction of 60-65%. There are no regional wall motion abnormalities. The left ventricular cavity size is normal. Spectral Doppler shows a normal pattern of left ventricular diastolic filling.  Left Atrium: The left atrium is moderately dilated.  Right Ventricle: The right ventricle is normal in size. There is normal right ventricular global systolic function.  Right Atrium: The right atrium is normal in size.  Aortic Valve: The aortic valve is trileaflet. There is evidence of mild aortic valve stenosis.  The aortic valve dimensionless index is 0.53. There is trace to mild aortic valve regurgitation. The peak instantaneous gradient of the aortic valve is 23.6 mmHg. The mean gradient of the aortic valve is 12.0 mmHg.  Mitral Valve: The mitral valve is normal in structure. There is no evidence of mitral valve regurgitation.  Tricuspid Valve: The tricuspid valve is structurally normal. No evidence of tricuspid regurgitation.  Pulmonic Valve: The pulmonic valve is structurally normal. 
There is physiologic pulmonic valve regurgitation.  Pericardium: There is no pericardial effusion noted.  Aorta: The aortic root is normal.      CONCLUSIONS:  1. The left ventricular systolic function is normal with a 60-65% estimated ejection fraction.  2. The left atrium is moderately dilated.  3. Mild aortic valve stenosis.    QUANTITATIVE DATA SUMMARY:  2D MEASUREMENTS:  Normal Ranges:  LAs:           4.60 cm   (2.7-4.0cm)  IVSd:          1.10 cm   (0.6-1.1cm)  LVPWd:         1.20 cm   (0.6-1.1cm)  LVIDd:         4.45 cm   (3.9-5.9cm)  LVIDs:         2.47 cm  LV Mass Index: 96.5 g/m2  LV % FS        44.5 %    LA VOLUME:  Normal Ranges:  LA Vol A4C:        51.7 ml    (22+/-6mL/m2)  LA Vol A2C:        58.3 ml  LA Vol BP:         59.8 ml  LA Vol Index A4C:  26.3ml/m2  LA Vol Index A2C:  29.7 ml/m2  LA Vol Index BP:   30.4 ml/m2  LA Area A4C:       20.0 cm2  LA Area A2C:       19.5 cm2  LA Major Axis A4C: 6.6 cm  LA Major Axis A2C: 5.5 cm  LA Volume Index:   29.0 ml/m2    RA VOLUME BY A/L METHOD:  Normal Ranges:  RA Area A4C: 13.3 cm2    M-MODE MEASUREMENTS:  Normal Ranges:  Ao Root: 2.66 cm (2.0-3.7cm)    LV SYSTOLIC FUNCTION BY 2D PLANIMETRY (MOD):  Normal Ranges:  EF-A4C View: 65.4 % (>55%)  EF-A2C View: 60.2 %  EF-Biplane:  65.1 %    LV DIASTOLIC FUNCTION:  Normal Ranges:  MV Peak E:    0.77 m/s (0.7-1.2 m/s)  MV Peak A:    1.07 m/s (0.42-0.7 m/s)  E/A Ratio:    0.72     (1.0-2.2)  MV e'         0.10 m/s (>8.0)  MV lateral e' 0.12 m/s  MV medial e'  0.09 m/s  E/e' Ratio:   7.34     (<8.0)    MITRAL VALVE:  Normal Ranges:  MV DT: 246 msec (150-240msec)    AORTIC VALVE:  Normal Ranges:  AoV Vmax:                2.43 m/s  (<1.7m/s)  AoV Peak P.6 mmHg (<20mmHg)  AoV Mean P.0 mmHg (1.7-11.5mmHg)  LVOT Max Missael:            1.12 m/s  (<1.1m/s)  AoV VTI:                 49.30 cm  (18-25cm)  LVOT VTI:                26.00 cm  AoV Dimensionless Index: 0.53    RIGHT VENTRICLE:  RV 1   3.43 
CBT (Cognitive Behavioral Therapy) Group
cm  RV 2   3.34 cm  RV 3   7.04 cm  TAPSE: 31.6 mm  RV s'  0.22 m/s    TRICUSPID VALVE/RVSP:  Normal Ranges:  TV E Vmax: 0.42 m/s (0.3-0.7m/s)  TV A Vmax: 0.43 m/s  IVC Diam:  1.63 cm      [id number] Piper Randle MD  Electronically signed on 4/21/2022 at 6:04:32 PM         Final     Stress Testing IMGRESULT(FQI4232:1:1825): No results found for this or any previous visit from the past 1825 days.    Cardiac Catheterization: No results found for this or any previous visit from the past 1825 days.  No results found for this or any previous visit from the past 3650 days.     Cardiac Scoring: No results found for this or any previous visit from the past 1825 days.    AAA : No results found for this or any previous visit from the past 1825 days.    OTHER: No results found for this or any previous visit from the past 1825 days.    LAST IMAGING RESULTS  ECG 12 Lead  Sinus bradycardia heart rate 56 bpm.      Problem List Items Addressed This Visit       Aortic valve stenosis    ASHD (arteriosclerotic heart disease) - Primary    Bradycardia    Hypertension (Chronic)    Hyperlipidemia (Chronic)    Syncope and collapse    Aortic regurgitation          Janes Gregory DO, FACC, FACOI  
CBT (Cognitive Behavioral Therapy) Group
CBT (Cognitive Behavioral Therapy) Group

## 2024-11-15 ENCOUNTER — OUTPATIENT (OUTPATIENT)
Dept: OUTPATIENT SERVICES | Facility: HOSPITAL | Age: 59
LOS: 1 days | Discharge: PSYCHIATRIC OR SUBSTANCE USE FACILITY | End: 2024-11-15
Payer: MEDICARE

## 2024-11-15 DIAGNOSIS — J34.2 DEVIATED NASAL SEPTUM: Chronic | ICD-10-CM

## 2024-11-15 DIAGNOSIS — K08.199 COMPLETE LOSS OF TEETH DUE TO OTHER SPECIFIED CAUSE, UNSPECIFIED CLASS: Chronic | ICD-10-CM

## 2024-11-15 PROCEDURE — 90791 PSYCH DIAGNOSTIC EVALUATION: CPT

## 2024-11-19 DIAGNOSIS — F25.9 SCHIZOAFFECTIVE DISORDER, UNSPECIFIED: ICD-10-CM

## 2024-11-19 NOTE — ED PROVIDER NOTE - NSSUBSTANCEUSE_GEN_ALL_CORE_SD
street drug/inhalant/medication abuse
cough and expectorated thick secretions, though relatively clear after (per RN, minimal suctioning required)/thick

## 2024-12-05 ENCOUNTER — APPOINTMENT (OUTPATIENT)
Dept: RHEUMATOLOGY | Facility: CLINIC | Age: 59
End: 2024-12-05
Payer: MEDICARE

## 2024-12-05 VITALS
SYSTOLIC BLOOD PRESSURE: 138 MMHG | DIASTOLIC BLOOD PRESSURE: 79 MMHG | RESPIRATION RATE: 16 BRPM | OXYGEN SATURATION: 98 % | HEART RATE: 60 BPM

## 2024-12-05 VITALS
RESPIRATION RATE: 16 BRPM | DIASTOLIC BLOOD PRESSURE: 74 MMHG | TEMPERATURE: 98.1 F | SYSTOLIC BLOOD PRESSURE: 116 MMHG | OXYGEN SATURATION: 97 % | HEART RATE: 74 BPM

## 2024-12-05 PROCEDURE — 96366 THER/PROPH/DIAG IV INF ADDON: CPT

## 2024-12-05 PROCEDURE — 96365 THER/PROPH/DIAG IV INF INIT: CPT

## 2024-12-05 RX ORDER — ACETAMINOPHEN 325 MG/1
325 TABLET ORAL
Qty: 0 | Refills: 0 | Status: COMPLETED
Start: 2024-09-04

## 2024-12-05 RX ORDER — CAMPHOR 0.45 %
25 GEL (GRAM) TOPICAL
Qty: 0 | Refills: 0 | Status: COMPLETED
Start: 2024-09-04

## 2024-12-05 RX ORDER — IMMUNE GLOBULIN INTRAVENOUS (HUMAN) 10 G
10 KIT INTRAVENOUS
Qty: 0 | Refills: 0 | Status: COMPLETED
Start: 2024-09-04

## 2024-12-06 PROCEDURE — 90833 PSYTX W PT W E/M 30 MIN: CPT

## 2024-12-06 PROCEDURE — 99214 OFFICE O/P EST MOD 30 MIN: CPT

## 2024-12-28 DIAGNOSIS — D80.1 NONFAMILIAL HYPOGAMMAGLOBULINEMIA: ICD-10-CM

## 2024-12-28 DIAGNOSIS — D83.9 COMMON VARIABLE IMMUNODEFICIENCY, UNSPECIFIED: ICD-10-CM

## 2024-12-28 RX ORDER — AMOXICILLIN AND CLAVULANATE POTASSIUM 875; 125 MG/1; MG/1
875-125 TABLET, COATED ORAL
Qty: 28 | Refills: 1 | Status: ACTIVE | COMMUNITY
Start: 2024-12-28 | End: 1900-01-01

## 2025-01-02 ENCOUNTER — APPOINTMENT (OUTPATIENT)
Dept: INFECTIOUS DISEASE | Facility: CLINIC | Age: 60
End: 2025-01-02

## 2025-01-02 ENCOUNTER — NON-APPOINTMENT (OUTPATIENT)
Age: 60
End: 2025-01-02

## 2025-01-02 ENCOUNTER — APPOINTMENT (OUTPATIENT)
Dept: ALLERGY | Facility: CLINIC | Age: 60
End: 2025-01-02
Payer: MEDICARE

## 2025-01-02 VITALS
HEART RATE: 77 BPM | SYSTOLIC BLOOD PRESSURE: 128 MMHG | OXYGEN SATURATION: 97 % | RESPIRATION RATE: 15 BRPM | TEMPERATURE: 98 F | DIASTOLIC BLOOD PRESSURE: 72 MMHG

## 2025-01-02 PROCEDURE — 99214 OFFICE O/P EST MOD 30 MIN: CPT

## 2025-01-03 PROCEDURE — 99214 OFFICE O/P EST MOD 30 MIN: CPT

## 2025-01-03 NOTE — BH DISCHARGE NOTE NURSING/SOCIAL WORK/PSYCH REHAB - NSDCSUICIDEPREP_PSY_ALL_CORE
No Patient unable to provide accurate detailed home medication list   - pharm tech emailed to update and verify home medications , day team to reconciled once completed

## 2025-01-06 ENCOUNTER — APPOINTMENT (OUTPATIENT)
Dept: RHEUMATOLOGY | Facility: CLINIC | Age: 60
End: 2025-01-06
Payer: MEDICARE

## 2025-01-06 VITALS
TEMPERATURE: 97.9 F | OXYGEN SATURATION: 96 % | DIASTOLIC BLOOD PRESSURE: 78 MMHG | RESPIRATION RATE: 16 BRPM | SYSTOLIC BLOOD PRESSURE: 129 MMHG | HEART RATE: 63 BPM

## 2025-01-06 VITALS
OXYGEN SATURATION: 96 % | DIASTOLIC BLOOD PRESSURE: 81 MMHG | HEART RATE: 60 BPM | SYSTOLIC BLOOD PRESSURE: 131 MMHG | RESPIRATION RATE: 16 BRPM

## 2025-01-06 PROCEDURE — 96366 THER/PROPH/DIAG IV INF ADDON: CPT

## 2025-01-06 PROCEDURE — 96365 THER/PROPH/DIAG IV INF INIT: CPT

## 2025-01-06 RX ORDER — ACETAMINOPHEN 325 MG/1
325 TABLET ORAL
Qty: 0 | Refills: 0 | Status: COMPLETED
Start: 2024-09-04

## 2025-01-06 RX ORDER — IMMUNE GLOBULIN INTRAVENOUS (HUMAN) 10 G
10 KIT INTRAVENOUS
Qty: 0 | Refills: 0 | Status: COMPLETED
Start: 2024-09-04

## 2025-01-06 RX ORDER — CAMPHOR 0.45 %
25 GEL (GRAM) TOPICAL
Qty: 0 | Refills: 0 | Status: COMPLETED
Start: 2024-09-04

## 2025-01-16 NOTE — PROGRESS NOTE ADULT - ASSESSMENT
57 y/o M PMHx schizoaffective disorder (bipolar type, w/ multiple [facility] admissions), HTN, hypothyroidism, CVID/ hypogammaglobulinemia (on monthly IVIG, last on 7/23) COPD, hx of frequent skin infection (MRSA abscess / cellulitis w/ MRSA bacteremia s/p debridement 6/2023) who presented after overdosing on labetalol    MRSA scalp abscess, axilla furuncle  afebrile, no leukocytosis  scalp furuncle w/ purulent drainage  wound cx w/ MRSA  - s/p I&D 9/8  scalp wound without tenderness, no further drainage  R axilla furuncle improved    Recommendations  c/w clindamycin 450mg PO every 8 hours x 5 days  mupirocin to b/l nares x 5 days  after discharge can attempt MRSA decolonization w/ hibaclens bath 3x per week x 2 weeks   f/u pathology from wound  no objection to discharge from ID perspective    Miltno Christine M.D.  [facility], Division of Infectious Diseases  [phone]  After 5pm on weekdays and all day on weekends - please call [phone]  [phone]

## 2025-01-20 PROCEDURE — 90833 PSYTX W PT W E/M 30 MIN: CPT

## 2025-01-20 PROCEDURE — 99213 OFFICE O/P EST LOW 20 MIN: CPT

## 2025-01-25 NOTE — PATIENT PROFILE ADULT - NSTRANSFERBELONGINGSDISPO_GEN_A_NUR
Please remind pt to get the fastin labs done  
Pt will go to [facility]  
with patient

## 2025-02-06 ENCOUNTER — APPOINTMENT (OUTPATIENT)
Dept: RHEUMATOLOGY | Facility: CLINIC | Age: 60
End: 2025-02-06
Payer: MEDICARE

## 2025-02-06 VITALS
RESPIRATION RATE: 16 BRPM | DIASTOLIC BLOOD PRESSURE: 74 MMHG | OXYGEN SATURATION: 97 % | SYSTOLIC BLOOD PRESSURE: 126 MMHG | HEART RATE: 78 BPM

## 2025-02-06 VITALS
DIASTOLIC BLOOD PRESSURE: 71 MMHG | RESPIRATION RATE: 16 BRPM | OXYGEN SATURATION: 95 % | HEART RATE: 60 BPM | TEMPERATURE: 97.9 F | SYSTOLIC BLOOD PRESSURE: 113 MMHG

## 2025-02-06 PROCEDURE — 96365 THER/PROPH/DIAG IV INF INIT: CPT

## 2025-02-06 PROCEDURE — 96366 THER/PROPH/DIAG IV INF ADDON: CPT

## 2025-02-06 RX ORDER — IMMUNE GLOBULIN INTRAVENOUS (HUMAN) 10 G
10 KIT INTRAVENOUS
Qty: 0 | Refills: 0 | Status: COMPLETED
Start: 2024-09-04

## 2025-02-06 RX ORDER — CAMPHOR 0.45 %
25 GEL (GRAM) TOPICAL
Qty: 0 | Refills: 0 | Status: COMPLETED
Start: 2024-09-04

## 2025-02-06 RX ORDER — ACETAMINOPHEN 325 MG/1
325 TABLET ORAL
Qty: 0 | Refills: 0 | Status: COMPLETED
Start: 2024-09-04

## 2025-02-11 PROCEDURE — 99214 OFFICE O/P EST MOD 30 MIN: CPT

## 2025-02-17 NOTE — BH CONSULTATION LIAISON ASSESSMENT NOTE - COLLATERAL SOURCE
Regarding: [place] M 82 right leg swollen below knee on warfarin  ----- Message from Kylie JONAS sent at 2/17/2025  8:07 AM CST -----  Patient Name: Dawit Cornejo    Specialist or PCP Name: DARVIN HERNANDEZ    Symptoms: [place] M 82 right leg swollen below knee on warfarin     Pregnant (females aged 13-60. If Yes, how long?) : n/a    Call Back # : [phone]    Which State are you currently located in?: [place]     Name of Clinic Site / Acct# : [facility] [address]     Call arrived during: Work Hours     Personal collateral

## 2025-02-25 PROCEDURE — 99214 OFFICE O/P EST MOD 30 MIN: CPT

## 2025-02-28 NOTE — BH PSYCHOLOGY - CLINICIAN PSYCHOTHERAPY NOTE - NSTXHYPERPROGRES_PSY_ALL_CORE
Improving
PAST MEDICAL HISTORY:  Asthma     Gastric cancer     H. pylori infection     H/O compression fracture of spine     Hypothyroid     Lumbosacral stenosis     Lupus     Mild obstructive sleep apnea     Myositis     Obesity     Osteoporosis     Prediabetes     
Improving

## 2025-03-04 NOTE — BH INPATIENT PSYCHIATRY PROGRESS NOTE - NSBHATTESTBILLING_PSY_A_CORE
24323-Yagbozfiel OBS or IP - moderate complexity OR 35-49 mins ONCOLOGY / HEMATOLOGY FOLLOW  UP NOTE    Ms. Jojo Colvin was seen at [facility], she is a 60 year old female with:    ONCOLOGY PROBLEMS:  Invasive moderately differentiated ductal carcinoma of right breast, diagnosed in 02/2022, ER 95% KY 1 to 2% HER2 0, status post bilateral total simple mastectomy and right axillary sentinel lymph nodes biopsy on 03/10/2022. Pathological stage pT2 pN1mi(sn).  ER 95%, KY 1 to 2%, HER2 0.    Left pleural biopsy positive for metastatic carcinoma, most consistent with metastatic ductal carcinoma diagnosed in 04/2024, staging scans from 03/2024 showed numerous pulmonary nodules and left pleural effusion.    Possible secondary malignant neoplasm to mediastinal and abdominal lymph nodes, on staging scans from 03/2024.    Multiple metastatic osseous lesions, on staging scans from 03/2024.      INTERVAL HISTORY:  Ms. Jojo Colvin is here for a follow up appointment.  Since the last visit she has been feeling better.      Patient continues on anastrozole and Ribociclib without significant complaints, she has been off Ribociclib for a duration of 2 months, in 12/2024 in 01/2025 after tooth removal as per her personal preference.  Patient to restart Ribociclib in 02/2025.  She also continues on palliative bisphosphonates, however bisphosphonates have been hold since removal of her tooth in 12/2024.  Since patient does not have a dentist and the tooth was removed as an emergency, she would like to hold off on her bisphosphonates at this time    She is being seen by palliative therapy for pain management.    Patient has completed imaging scans for evaluation of upper extremity pain, she has followed with radiation oncology as well as orthopedic surgery for evaluation and possible intervention.  She was evaluated by Dr. Nicole from radiation oncology on 11/12/2024 for a course of palliative radiation to her right shoulder region which may or may not be related to the  clavicular head fracture and manubrium involvement with cancer.  She was also evaluated by orthopedic surgery on 11/15/2024, who recommended a steroid injection into the right shoulder and to follow-up as needed.      Please see review of systems below and the positive findings are highlighted.    PAST MEDICAL HISTORY:    Reviewed in patient's Epic chart.    FAMILY HISTORY:  Reviewed in patient's Epic chart.    Social History     Tobacco Use   Smoking Status Never    Passive exposure: Past   Smokeless Tobacco Never   Tobacco Comments    SECOND HAND     REVIEW OF SYSTEMS  GENERAL:  Patient denies headache, fevers, chills, night sweats, excessive fatigue, change in appetite, weight loss, dizziness  ALLERGIC/IMMUNOLOGIC: Verified allergies: Yes  EYES:  Patient denies significant visual difficulties, double vision, blurred vision  ENT/MOUTH: Patient denies problems with hearing, sore throat, mouth sores, but complains of: sinus drainage  ENDOCRINE:  Patient denies diabetes, thyroid disease, hormone replacement, hot flashes  HEMATOLOGIC/LYMPHATIC: Patient denies easy bruising, bleeding, tender lymph nodes, swollen lymph nodes  BREASTS: Patient denies abnormal masses of breast, nipple discharge, pain  RESPIRATORY:  Patient denies lung pain with breathing, coughing up blood, shortness of breath, but complains of: cough states it hurts on her right side when she coughs   CARDIOVASCULAR:  Patient denies anginal chest pain, palpitations, shortness of breath when lying flat, peripheral edema  GASTROINTESTINAL: Patient denies abdominal pain , nausea, vomiting, diarrhea, GI bleeding, constipation, change in bowel habits, heartburn, sensation of feeling full, difficulty swallowing  : Patient denies abnormal genital masses, blood in the urine, frequency, urgency, burning with urination, hesitancy, incontinence, vaginal bleeding, discharge  MUSCULOSKELETAL:  Patient denies joint pain, bone pain, joint swelling, redness, decreased  range of motion, has aches in her legs   SKIN:  Patient denies chronic rashes, inflammation, ulcerations, skin changes, itching  NEUROLOGIC:  Patient denies loss of balance, areas of focal weakness, abnormal gait, sensory problems, but complains of: numbness feet and tingling feet  PSYCHIATRIC: Patient denies insomnia, depression, anxiety    PHYSICAL EXAMINATION:    Jojo Colvin is a 60 year old female who is alert, oriented times 3, in no apparent distress.  VITALS:    Visit Vitals  /80 (BP Location: RUE - Right upper extremity, Patient Position: Sitting)   Pulse 93   Temp 97.9 °F (36.6 °C) (Oral)   Resp 16   Wt 69.9 kg (154 lb 1.6 oz)   SpO2 97%   BMI 26.45 kg/m²     ECO - No physically strenuous activity, but ambulatory and able to carry out light or sedentary work.    HEENT:  No oral lesions.  NECK:  Supple with no cervical or supraclavicular adenopathy.    LUNGS:  Clear to auscultation bilaterally  CARDIOVASCULAR:  Regular rate and rhythm.    BREASTS:  DEFERRED TODAY. FROM PRIOR VISITS: Status post bilateral mastectomies without reconstruction.  No palpable masses.  No axillary lymphadenopathy.  EXTREMITIES:  No edema of lower extremities.    NEUROLOGIC:  No focal deficit.  LYMPHATIC SYSTEM:  No cervical, supraclavicular or axillary lymphadenopathy.    LABS:  Recent Labs   Lab 25  0930 25  1030 25  1546 24  0915 24  1035   WBC 2.0* 2.0* 5.1 2.9* 2.5*   RBC 4.20 4.20 4.58 4.45 4.27   HGB 14.3 14.0 14.7 14.8 14.3   HCT 40.8 41.5 45.0 43.2 41.5   MCV 97.1 98.8 98.3 97.1 97.2    206 267 276 206   Absolute Neutrophils 1.2* 1.2* 3.6 1.9 1.6*   Absolute Lymphocytes 0.5* 0.6* 1.1 0.7* 0.6*   Absolute Monocytes 0.2* 0.2* 0.3 0.3 0.3   Absolute Eosinophils  0.0 0.0 0.0 0.0 0.0   Absolute Basophils 0.0 0.0 0.0 0.0 0.0     Recent Labs   Lab 25  0930 25  1030 25  1546 24  0900 10/22/24  0937 24  0534 24  0809   Glucose 111* 77 83 122*  100*   < > 118*   Sodium 142 144 140 141 144   < > 136   Potassium 4.2 3.6 4.1 3.7 3.8   < > 3.3*   Chloride 105 105 109 104 107   < > 98   BUN 20 18 15 15 16   < > 12   Creatinine 1.01* 0.89 0.82 0.81 0.82   < > 0.80   Calcium 9.3 9.1 10.2 9.2 9.2   < > 12.7*   Protein, Total 7.2 7.0 7.0 7.4 7.3   < > 7.7   Albumin 3.5 3.4 3.9 3.7 3.6   < > 3.2*   GOT/AST 24 18 27 22 19   < > 30   Alkaline Phosphatase 53 51 48 51 54   < > 107   GPT 40 34 28 35 21   < > 46   Globulin 3.7 3.6 3.1 3.7 3.7   < > 4.5*   LD, Total  --   --   --   --   --   --  161    < > = values in this interval not displayed.     RADIOLOGICAL DATA:  Nov 10, 2024 MRI CERVICAL SPINE WO CONTRAST, MRI BRACHIAL PLEXUS RIGHT WO CONTRAST   IMPRESSION:    CERVICAL SPINE:  1.  No evidence of metastatic disease in the cervical spine, within the limits of this noncontrast study.  2.  Degenerative changes, as described. No significant spinal canal stenosis. At C6-C7, in the right neural foramen, there are two small T2 hyperintense foci, measuring 3 mm each. One is favored to represent a perineural cyst. The second may represent a synovial cyst arising off the  right facet. This abuts the exiting right C7 nerve root. Correlate with symptoms of right C7 radiculopathy.  3.  Partially imaged findings of acute sphenoid sinusitis.  RIGHT BRACHIAL PLEXUS:  1.  No mass or abnormal signal involving the right brachial plexus.  2.  3.5 cm T2 hyperintense lytic lesion in the right aspect of the manubrium is again seen. This corresponds to the area of uptake on the recent bone scan. This is concerning for metastatic disease. Follow-up contrast-enhanced MRI of the chest is suggested to assess for the presence  of viable tumor.  3.  Abnormal T2 hyperintensity in the right clavicular head corresponding to the fracture deformity on the previous study is likely posttraumatic, although a pathologic fracture is a possibility. This could also be better assessed with contrast.  4.   There is a small T2 hyperintensity in the right T6 transverse process corresponding to a lytic lesion on the prior CT, also concerning for osseous metastasis.  5.  Partially imaged right shoulder degeneration, as described.     2/12/2025 NM BONE WHOLE BODY   IMPRESSION:   1. No scintigraphic evidence of new osseous metastatic disease.  2. Multifocal osseous radiotracer involving the medial right clavicle, sternum, multiple ribs, the T5 vertebral body/transverse process, right superior/inferior pubic rami, left greater trochanter, left acetabulum/femoral head, and the right femoral diaphysis, overall not significantly changed. Some of the lesions are suspected to represent metastatic lesions, for example in the sternum, multiple ribs, the T5  vertebral body/right transverse process, and right femoral diaphysis, some of which have associated pathologic fractures. Some of the other areas of focal uptake may simply be secondary to trauma/fractures.    02/27/25 CT CHEST ABDOMEN PELVIS WO CONTRAST   IMPRESSION:   1.  Unchanged scattered osseous fractures and lesions.  2.  Decreased trace left pleural effusion. Scattered areas of apparent increased left pleural nodularity may just reflect loculated fluid given the low density, less likely true soft tissue. Prior left talc pleurodesis and lack of IV contrast limit assessment.  3.  No suspicious pulmonary nodules.  4.  No evidence of nonosseous metastatic disease in the abdomen or pelvis.    PATHOLOGICAL DATA:  02/0/2022 breast, right, 6:00, 1 cm from nipple, needle biopsies:   -Invasive moderately differentiated ductal carcinoma, at least 1.0 cm in greatest dimension  BREAST BIOMARKERS  Block: A1  Estrogen Receptor: POSITIVE         Marjan Score 8 (5 + 3); 95% of tumor nuclei,  strong staining    Progesterone Receptor: LOW POSITIVE         Marjan Score 4 (2 + 2); 1-2% of tumor nuclei, moderate staining   HER2 by IHC: Negative (0+)    03/10/2022 bilateral total simple  mastectomy and right axillary sentinel lymph nodes biopsy.  Path showed poorly differentiated invasive ductal carcinoma present in the medial aspect of the lower outer quadrant of right breast, tumor size 4 x 4 x 3.5 cm.  Metastatic carcinoma consistent with micrometastases with largest contiguous focus 1.6 mm present in 1 out of 2 sentinel lymph nodes with no extracapsular extension.  Pathological stage pT2 pN1mi(sn).  ER 95%, FL 1 to 2%, HER2 0    03/21/2024 A: Pleural fluid:  - Rare atypical cells are present.    04/03/2024 left pleura, biopsy:   -Metastatic carcinoma, most consistent with metastatic ductal carcinoma  BREAST BIOMARKERS  Block: A1  Estrogen Receptor: POSITIVE               >95% of tumor nuclei, strong staining.  Progesterone Receptor: POSITIVE               45% of tumor nuclei, moderate staining.  HER2 by IHC: NEGATIVE (1+)  Immunohistochemistry (IHC) Testing for Mismatch Repair (MMR) Proteins   Accession: [id number],  Block A1  MLH1        Intact nuclear expression (NORMAL)   MSH2        Intact nuclear expression (NORMAL)   MSH6        Intact nuclear expression (NORMAL)   PMS2        Intact nuclear expression (NORMAL)  Interpretation   No loss of nuclear expression of MMR proteins: LOW PROBABILITY of microsatellite instability  PD-L1 22C3 (KEYTRUDA) Immunohistochemical Staining Results:   Block: A1 ([id number], A1)  Combined Positive Score (CPS): 20    ASSESSMENT:  Ms. Jojo Colvin is a 60 year old female with the following hematology/oncology problems:    Invasive moderately differentiated ductal carcinoma of right breast, diagnosed in 02/2022, ER 95% FL 1 to 2% HER2 0, s/p bilateral total simple mastectomy and right axillary SLNB on 03/10/2022. Pathological stage pT2 pN1mi(sn).  ER 95%, FL 1-2%, HER2 0. Diagnosed with metastatic carcinoma on a left pleural biopsy in 04/2024, staging scans from 03/2024 showed numerous pulmonary nodules, left pleural effusion, mediastinal and abdominal  adenopathy and multiple metastatic osseous lesions.  Status post bilateral mastectomies with right SLNB 03/10/2022  Oncotype recurrence score 46 with 9 year risk of distant recurrence of 38% with adjuvant endocrine therapy with benefit of chemotherapy approximately 15%  Adjuvant chemotherapy: TC for 4 cycles from 5/9/2022-07/12/2022  Radiation therapy:  Patient declined radiation therapy  Adjuvant endocrine therapy:  Patient declined adjuvant endocrine therapy  Recurrent breast cancer: CT CAP 3/10/24 showed multiple metastatic osseous lesions, numerous pulmonary nodules, new partially loculated large left pleural effusion with associated mass effect and mediastinal deviation, small to moderate right pleural effusion, and extensive consolidation involving the left lung including the left lower lobe and left perihilar region  MRI femur 3/10/24 showed displaced pathologic fracture of the left lesser trochanter, with lesion measuring 1.9 x 1.4 x 2.2 cm and consistent with lytic osseous metastatic disease  Status post left open reduction and internal fixation of inter/subtrochanteric femoral neck fracture with deep bone biopsy on 03/11/2024.  Biopsy showed fragments of trabecular bone with hemorrhage negative for malignancy  Status post diagnostic and therapeutic thoracentesis 03/14/2024.  Cytology showed rare atypical cells suspicious for involvement with metastatic breast cancer but not definitive.  Anastrozole 1 mg p.o. daily started 3/17/2024    Status post left VATS with drainage of pleural effusion, pleural biopsy and talc pleurodesis 03/21/2024. Pleural biopsy showed metastatic carcinoma most consistent with metastatic ductal carcinoma ER > 95% strong positive, AL 45% moderate staining HER2 1+  Ribociclib initiated 4/2024, decreased dose to 400 mg/day 8/28/2024 due to continued low ANC(Ribociclib break for 2 months in 12/2024 and 01/2025 after tooth removal)    Continue palliative systemic therapy with anastrozole  + Ribociclib until progression of disease or intolerable toxicity.  Most recent CT CAP and bone scan from 12/2025 stable with no radiographic suspicion for disease progression.      Patient will proceed with surveillance scans with CT CAP without contrast (secondary to anaphylaxis as per patient)and bone scan to evaluate for stability versus progression versus improvement, Q4-6 months, request a repeat in 07/2025.    Bone metastasis  Palliative bisphosphonates, Zometa C1D1 on 07/10/2024.  Plan was to continue every 3 months.  Since patient had significant bone pains, mutual decision between the patient and prior oncologist was to proceed with reduced dosing at 3 mg every 3 months. C2D1 on 10/22/2024.  C3 has been delayed until 04/2025 secondary to tooth removal around Christmas 2024, and as per patient's request.  Patient s/p 1 XRT fraction to L hip.  Patient following with radiation oncology regarding upper extremity pain.  She has also been evaluated by orthopedic surgery and is status post steroid injection in her right shoulder.  Patient states that she may proceed with palliative radiation to her right shoulder.  She will follow-up with radiation oncology.  We did try     3.  Genetics:   N[employer] CancerNext-Expanded + RNA, which looks at 77 genes was performed 04/2022. Jojo's results were negative, meaning no pathogenic variants identified in the genes tested.     Neutropenia   Improved since decreasing dose of ribociclib. Continue to monitor.  Okay to start next cycle of Ribociclib     Facial rash  Developed in end of December 2024, possibly secondary to the antibiotic that she used, however patient thinks this is from Ribociclib.  Rash is not itchy, patient had no relief from oral Benadryl or hydrocortisone topical cream.  Trial of Medrol Dosepak for 5 days with much improvement in rash.  Patient was offered to be evaluated by dermatology, however she does not want to do that at this  time.    PLAN/RECOMMENDATIONS:   Continue with anastrozole 1 mg p.o. daily and Ribociclib 400 mg p.o. daily days 1-21 in a 28-day cycle with palliative intent, until progression of disease or intolerable toxicity. ANC 1200 today, continue at current dose for now  Continue palliative bisphosphonates, zoledronic acid 3 mg at reduced dosing every 3 months secondary to toxicity, C3 to be scheduled in 4 weeks, delayed secondary to tooth removal and as per patient's preference  Surveillance scans every 3 to 6 months to evaluate for stability versus progression on current systemic therapy, CT CAP and bone scan  to be requested in 07/2025  Return in 4 weeks with a CBC with differential, CMP, sooner as clinically indicated.  Patient did request a visit every 4 weeks  Continue following with palliative care for neoplasm related pain

## 2025-03-06 ENCOUNTER — APPOINTMENT (OUTPATIENT)
Dept: RHEUMATOLOGY | Facility: CLINIC | Age: 60
End: 2025-03-06
Payer: MEDICARE

## 2025-03-06 VITALS
HEART RATE: 67 BPM | OXYGEN SATURATION: 96 % | SYSTOLIC BLOOD PRESSURE: 126 MMHG | WEIGHT: 246 LBS | DIASTOLIC BLOOD PRESSURE: 83 MMHG | BODY MASS INDEX: 31.58 KG/M2 | TEMPERATURE: 98 F | RESPIRATION RATE: 16 BRPM

## 2025-03-06 VITALS
SYSTOLIC BLOOD PRESSURE: 137 MMHG | HEART RATE: 76 BPM | DIASTOLIC BLOOD PRESSURE: 79 MMHG | RESPIRATION RATE: 16 BRPM | OXYGEN SATURATION: 96 %

## 2025-03-06 PROCEDURE — 96366 THER/PROPH/DIAG IV INF ADDON: CPT

## 2025-03-06 PROCEDURE — 96365 THER/PROPH/DIAG IV INF INIT: CPT

## 2025-03-06 RX ORDER — CAMPHOR 0.45 %
25 GEL (GRAM) TOPICAL
Qty: 0 | Refills: 0 | Status: COMPLETED
Start: 2024-09-04

## 2025-03-06 RX ORDER — IMMUNE GLOBULIN INTRAVENOUS (HUMAN) 10 G
10 KIT INTRAVENOUS
Qty: 0 | Refills: 0 | Status: COMPLETED
Start: 2024-09-04

## 2025-03-06 RX ORDER — ACETAMINOPHEN 325 MG/1
325 TABLET ORAL
Qty: 0 | Refills: 0 | Status: COMPLETED
Start: 2024-09-04

## 2025-03-25 NOTE — ED PROVIDER NOTE - NSICDXPASTMEDICALHX_GEN_ALL_CORE_FT
Mailbox full. unable to leave message to schedule appt per patient request.   
PAST MEDICAL HISTORY:  Abscess of finger     Bipolar disorder     Chronic hyponatremia     CVID (common variable immunodeficiency)     DM (diabetes mellitus)     HTN (hypertension)     Hyponatremia     Smoker     Smoker

## 2025-03-27 ENCOUNTER — APPOINTMENT (OUTPATIENT)
Dept: ALLERGY | Facility: CLINIC | Age: 60
End: 2025-03-27
Payer: MEDICARE

## 2025-03-27 PROCEDURE — 99214 OFFICE O/P EST MOD 30 MIN: CPT

## 2025-03-27 RX ORDER — IMMUNE GLOBULIN INTRAVENOUS (HUMAN) 10 G
10 KIT INTRAVENOUS
Refills: 0 | Status: ACTIVE | OUTPATIENT
Start: 2025-03-27 | End: 1900-01-01

## 2025-03-27 RX ORDER — IMMUNE GLOBULIN INFUSION (HUMAN) 100 MG/ML
20 INJECTION, SOLUTION INTRAVENOUS; SUBCUTANEOUS
Refills: 0 | Status: DISCONTINUED | OUTPATIENT
Start: 2025-03-24 | End: 2025-03-27

## 2025-03-28 NOTE — PROGRESS NOTE ADULT - PROBLEM SELECTOR PLAN 1
normal appearance , without tenderness upon palpation , no deformities , trachea midline , Thyroid normal size , no masses , thyroid nontender Patient presenting with worsening buttock cellulitis; patient has presented with buttock SSTI 4x in the last 4 months. CT abd/pelvis w/o evidence of any drainable collection or subcutaneous gas. S/p vanc and cefepime in ED, now on IV vancomycin. ID and wound care following, appreciate recs. Vanc trough 10.2 on 7/31 and 11.8 on repeat, maintained 1750mg dose per ID. BCx neg at 72h. Ultrasound concerning for phlegmon and abscess. Now s/p gen surg I&D with improved pain/erythema/swelling, wound cx positive for MRSA. Vanc trough 8/7 was 21.8 so vanc dose was reduced to 1500mg. Most recent vanc trough was 25.5 so vancomycin was discontinued today.  - Recheck vanc levels qday until <20, then consider restarting IV vanc vs. OP doxy regimen  - ID following, appreciate recs: discharge with doxy 100mg q12 x14d  - wound care following, appreciate recs  - gen surg following, appreciate recs: f/u outpatient with Dr. Diaz within 1 week after discharge

## 2025-04-03 ENCOUNTER — APPOINTMENT (OUTPATIENT)
Dept: RHEUMATOLOGY | Facility: CLINIC | Age: 60
End: 2025-04-03
Payer: MEDICARE

## 2025-04-03 VITALS
HEART RATE: 65 BPM | DIASTOLIC BLOOD PRESSURE: 66 MMHG | TEMPERATURE: 97.9 F | OXYGEN SATURATION: 96 % | SYSTOLIC BLOOD PRESSURE: 101 MMHG | RESPIRATION RATE: 16 BRPM

## 2025-04-03 VITALS
OXYGEN SATURATION: 96 % | HEART RATE: 58 BPM | RESPIRATION RATE: 16 BRPM | WEIGHT: 245 LBS | SYSTOLIC BLOOD PRESSURE: 127 MMHG | BODY MASS INDEX: 31.46 KG/M2 | DIASTOLIC BLOOD PRESSURE: 80 MMHG

## 2025-04-03 PROCEDURE — 96366 THER/PROPH/DIAG IV INF ADDON: CPT

## 2025-04-03 PROCEDURE — 96365 THER/PROPH/DIAG IV INF INIT: CPT

## 2025-04-03 RX ORDER — CAMPHOR 0.45 %
25 GEL (GRAM) TOPICAL
Qty: 0 | Refills: 0 | Status: COMPLETED
Start: 2024-09-04

## 2025-04-03 RX ORDER — IMMUNE GLOBULIN INTRAVENOUS (HUMAN) 10 G
10 KIT INTRAVENOUS
Qty: 0 | Refills: 0 | Status: COMPLETED
Start: 2025-03-27

## 2025-04-03 RX ORDER — ACETAMINOPHEN 325 MG/1
325 TABLET ORAL
Qty: 0 | Refills: 0 | Status: COMPLETED
Start: 2024-09-04

## 2025-04-08 PROCEDURE — 99214 OFFICE O/P EST MOD 30 MIN: CPT

## 2025-04-08 NOTE — BH INPATIENT PSYCHIATRY PROGRESS NOTE - NS ED BHA REVIEW OF ED CHART AVAILABLE INVESTIGATIONS REVIEWED
[facility] Hospitalist Progress Note    Admitting Date and Time: 4/6/2025  3:31 PM  Admit Dx: Inability to ambulate due to knee [R26.2]  Closed head injury, initial encounter [S09.90XA]  Fall, initial encounter [W19.XXXA]  Ambulatory dysfunction [R26.2]  Acute gout of right knee, unspecified cause [M10.9]    Synopsis:    Mr. Hemal Banks, a 76 y.o. year old male  who  has a past medical history of Arthritis, Benign hypertensive kidney disease with chronic kidney disease, Cervical vertebra fusion, CHF (congestive heart failure) ([facility]), Chronic bilateral low back pain with bilateral sciatica, Chronic kidney disease (CKD), stage III (moderate) ([facility]), Depression, Hypertension, Kidney stones, Lumbar spinal stenosis, Motion sickness, Stroke ([facility]), TB (pulmonary tuberculosis), and Type 2 diabetes mellitus with diabetic polyneuropathy, with long-term current use of insulin ([facility]).      Patient presented to the emergency department for right knee pain and gout flareup.  Has been going on for about a month.  Patient reports she is fallen several times.  Patient has neuropathy and cannot feel his feet.  Patient reports he did hit his head today.  Unable to take care of himself at home.  Denies fever, chills, nausea, vomiting, chest pain, shortness of breath, headache, dizziness, abdominal pain, dysuria, hematuria, constipation, diarrhea.  Vital signs within normal limits and stable.  The patient afebrile.  Laboratory studies demonstrate potassium 3.4, creatinine 2.0, glucose 138.  CT of the head and neck were unremarkable.  Patient was given pain medications in the emergency department.  Attempts made to ambulate without success.  Case was discussed with the emergency department physician.    4/7: Awaiting placement    Subjective:  Patient is being followed for Inability to ambulate due to knee [R26.2]  Closed head injury, initial encounter [S09.90XA]  Fall, initial encounter [W19.XXXA]  Ambulatory dysfunction 
 CLINICAL PHARMACY NOTE: MEDS TO BEDS    Total # of Prescriptions Filled: 2   The following medications were delivered to the patient:  Oxycodone 5  Allopurinol 100    Additional Documentation:  Peewee Abdullahi picked up   
Call to pharmacy to approve medications, at least the hydralazine as patients blood pressure remains high.   
Message sent to Cierra Pulido regarding \"Patients home medications are reconcilliated, patients most recent blood pressure in the ER prior to coming ip was 164/148. Upon arriving to the floor, his BP ws 207/189. He has not had any of his home medications since yesterday. Thank you.\". Awaiting response/orders.   
Yes
2-5x/week x 1-2 weeks.    Time in  1005  Time out  1025    Total Treatment Time  10 minutes     Evaluation Time includes thorough review of current medical information, gathering information on past medical history/social history and prior level of function, completion of standardized testing/informal observation of tasks, assessment of data and education on plan of care and goals.    CPT codes:  [x] Low Complexity PT evaluation 98888  [] Moderate Complexity PT evaluation 94616  [] High Complexity PT evaluation 18322  [] PT Re-evaluation 91563  [] Gait training 72973 0 minutes  [] Manual therapy 33415 0 minutes  [x] Therapeutic activities 30640 10 minutes  [] Therapeutic exercises 76249 0 minutes  [] Neuromuscular reeducation 60591 0 minutes     Roberto Albrecht, PT, DPT  [id number]  
minutes    Min Units   OT Eval Low 57531  x     OT Eval Medium 48579      OT Eval High 16587      OT Re-Eval 38562       Therapeutic Ex 53873       Therapeutic Activities 44871       ADL/Self Care 24651  8 1    Orthotic Management 49081       Splint Fitting/Training 98227     Manual 45976     Neuro Re-Ed 87796       Non-Billable Time          Evaluation time additionally includes thorough review of current medical information, gathering information on PMH/social history & PLOF, administration/interpretation of standardized testing/informal observation of tasks, assessment of data, consultation within the interdisciplinary team, & development of POC/goals.            Stella Baeza OTR/L; [id number]            
Yes

## 2025-04-16 ENCOUNTER — NON-APPOINTMENT (OUTPATIENT)
Age: 60
End: 2025-04-16

## 2025-04-16 VITALS — HEIGHT: 74 IN | WEIGHT: 240 LBS | BODY MASS INDEX: 30.8 KG/M2

## 2025-04-16 DIAGNOSIS — F17.200 NICOTINE DEPENDENCE, UNSPECIFIED, UNCOMPLICATED: ICD-10-CM

## 2025-04-17 ENCOUNTER — APPOINTMENT (OUTPATIENT)
Dept: CT IMAGING | Facility: IMAGING CENTER | Age: 60
End: 2025-04-17

## 2025-04-17 ENCOUNTER — OUTPATIENT (OUTPATIENT)
Dept: OUTPATIENT SERVICES | Facility: HOSPITAL | Age: 60
LOS: 1 days | End: 2025-04-17
Payer: MEDICARE

## 2025-04-17 DIAGNOSIS — F17.210 NICOTINE DEPENDENCE, CIGARETTES, UNCOMPLICATED: ICD-10-CM

## 2025-04-17 DIAGNOSIS — J34.2 DEVIATED NASAL SEPTUM: Chronic | ICD-10-CM

## 2025-04-17 PROCEDURE — 71271 CT THORAX LUNG CANCER SCR C-: CPT | Mod: 26

## 2025-04-17 PROCEDURE — 71271 CT THORAX LUNG CANCER SCR C-: CPT

## 2025-04-22 PROCEDURE — 99214 OFFICE O/P EST MOD 30 MIN: CPT

## 2025-05-01 ENCOUNTER — APPOINTMENT (OUTPATIENT)
Dept: RHEUMATOLOGY | Facility: CLINIC | Age: 60
End: 2025-05-01
Payer: MEDICARE

## 2025-05-01 VITALS
SYSTOLIC BLOOD PRESSURE: 134 MMHG | RESPIRATION RATE: 16 BRPM | HEART RATE: 55 BPM | OXYGEN SATURATION: 97 % | TEMPERATURE: 97.9 F | DIASTOLIC BLOOD PRESSURE: 79 MMHG

## 2025-05-01 VITALS
OXYGEN SATURATION: 98 % | SYSTOLIC BLOOD PRESSURE: 136 MMHG | DIASTOLIC BLOOD PRESSURE: 82 MMHG | HEART RATE: 60 BPM | RESPIRATION RATE: 16 BRPM

## 2025-05-01 PROCEDURE — 96365 THER/PROPH/DIAG IV INF INIT: CPT

## 2025-05-01 PROCEDURE — 96366 THER/PROPH/DIAG IV INF ADDON: CPT

## 2025-05-01 RX ORDER — ACETAMINOPHEN 325 MG/1
325 TABLET ORAL
Qty: 0 | Refills: 0 | Status: COMPLETED
Start: 2025-05-01

## 2025-05-01 RX ORDER — ACETAMINOPHEN 325 MG/1
325 TABLET ORAL
Refills: 0 | Status: ACTIVE | OUTPATIENT
Start: 2025-05-01 | End: 1900-01-01

## 2025-05-01 RX ORDER — DIPHENHYDRAMINE HCL 25 MG/1
25 TABLET ORAL
Refills: 0 | Status: ACTIVE | OUTPATIENT
Start: 2025-05-01 | End: 1900-01-01

## 2025-05-01 RX ORDER — IMMUNE GLOBULIN INTRAVENOUS (HUMAN) 10 G
10 KIT INTRAVENOUS
Qty: 0 | Refills: 0 | Status: COMPLETED
Start: 2025-03-27

## 2025-05-01 RX ORDER — DIPHENHYDRAMINE HCL 25 MG/1
25 TABLET ORAL
Qty: 0 | Refills: 0 | Status: COMPLETED
Start: 2025-05-01

## 2025-05-01 RX ADMIN — DIPHENHYDRAMINE HCL 0 MG: 25 TABLET ORAL at 00:00

## 2025-05-01 RX ADMIN — ACETAMINOPHEN 0 MG: 325 TABLET ORAL at 00:00

## 2025-05-29 ENCOUNTER — APPOINTMENT (OUTPATIENT)
Dept: RHEUMATOLOGY | Facility: CLINIC | Age: 60
End: 2025-05-29
Payer: MEDICARE

## 2025-05-29 VITALS
DIASTOLIC BLOOD PRESSURE: 68 MMHG | SYSTOLIC BLOOD PRESSURE: 107 MMHG | RESPIRATION RATE: 16 BRPM | HEART RATE: 56 BPM | OXYGEN SATURATION: 95 %

## 2025-05-29 VITALS
OXYGEN SATURATION: 97 % | RESPIRATION RATE: 16 BRPM | DIASTOLIC BLOOD PRESSURE: 71 MMHG | SYSTOLIC BLOOD PRESSURE: 127 MMHG | HEART RATE: 62 BPM

## 2025-05-29 PROCEDURE — 96366 THER/PROPH/DIAG IV INF ADDON: CPT

## 2025-05-29 PROCEDURE — 96365 THER/PROPH/DIAG IV INF INIT: CPT

## 2025-05-29 RX ORDER — DIPHENHYDRAMINE HCL 25 MG/1
25 TABLET ORAL
Qty: 0 | Refills: 0 | Status: COMPLETED
Start: 2025-05-01

## 2025-05-29 RX ORDER — ACETAMINOPHEN 325 MG/1
325 TABLET ORAL
Qty: 0 | Refills: 0 | Status: COMPLETED
Start: 2025-05-01

## 2025-05-29 RX ORDER — IMMUNE GLOBULIN INTRAVENOUS (HUMAN) 10 G
10 KIT INTRAVENOUS
Qty: 0 | Refills: 0 | Status: COMPLETED
Start: 2025-03-27

## 2025-06-02 NOTE — ED PROVIDER NOTE - NS ED ROS FT
172.72
[place] DO:  Gen: No fever, normal appetite  Eyes: No eye irritation or discharge  ENT: No ear pain, congestion, sore throat  Resp: No cough or trouble breathing  Cardiovascular: No chest pain or palpitation  Gastroenteric: No nausea/vomiting, diarrhea   :  No change in urine output; no dysuria  MS: No joint or muscle pain  Skin: No rashes  Neuro: No headache; no abnormal movements  Psych: + SI , no AH/ VH, no HI   Remainder negative, except as per the HPI

## 2025-06-02 NOTE — PATIENT PROFILE ADULT - FUNCTIONAL ASSESSMENT - BASIC MOBILITY 3.
Name of Medication(s) Requested:  Requested Prescriptions     Pending Prescriptions Disp Refills    budesonide-formoterol (SYMBICORT) 80-4.5 MCG/ACT AERO [Pharmacy Med Name: SYMBICORT 80-4.5 MCG INH 80-4.5 Aerosol] 10.2 g 1     Sig: INHALE TWO (2) PUFFS BY MOUTH TWICE DAILY       Medication is on current medication list Yes    Dosage and directions were verified? Yes    Quantity verified: 30 day supply     Pharmacy Verified?  Yes    Last Appointment:  3/10/2025    Future appts:  Future Appointments   Date Time Provider Department Center   6/4/2025 11:00 AM SCHEDULE, [facility]   6/10/2025  2:40 PM Geronimo Vick APRN - CNP [facility] NEURO Neurology -   7/24/2025  8:00 AM [facility] INF CLINIC ROOM 3 [facility]        (If no appt send self scheduling link. .REFILLAPPT)  Scheduling request sent?     [] Yes  [] No    Does patient need updated?  [] Yes  [] No   4 = No assist / stand by assistance

## 2025-06-26 ENCOUNTER — APPOINTMENT (OUTPATIENT)
Dept: RHEUMATOLOGY | Facility: CLINIC | Age: 60
End: 2025-06-26
Payer: MEDICARE

## 2025-06-26 VITALS
RESPIRATION RATE: 16 BRPM | HEART RATE: 60 BPM | TEMPERATURE: 97.9 F | DIASTOLIC BLOOD PRESSURE: 80 MMHG | OXYGEN SATURATION: 95 % | SYSTOLIC BLOOD PRESSURE: 132 MMHG

## 2025-06-26 VITALS
OXYGEN SATURATION: 97 % | RESPIRATION RATE: 16 BRPM | SYSTOLIC BLOOD PRESSURE: 133 MMHG | DIASTOLIC BLOOD PRESSURE: 76 MMHG | HEART RATE: 56 BPM

## 2025-06-26 PROCEDURE — 96365 THER/PROPH/DIAG IV INF INIT: CPT

## 2025-06-26 PROCEDURE — 96366 THER/PROPH/DIAG IV INF ADDON: CPT

## 2025-06-26 RX ORDER — ACETAMINOPHEN 325 MG/1
325 TABLET ORAL
Qty: 0 | Refills: 0 | Status: COMPLETED
Start: 2025-05-01

## 2025-06-26 RX ORDER — IMMUNE GLOBULIN INTRAVENOUS (HUMAN) 10 G
10 KIT INTRAVENOUS
Qty: 0 | Refills: 0 | Status: COMPLETED
Start: 2025-03-27

## 2025-06-26 RX ORDER — DIPHENHYDRAMINE HCL 25 MG/1
25 TABLET ORAL
Qty: 0 | Refills: 0 | Status: COMPLETED
Start: 2025-05-01

## 2025-07-10 ENCOUNTER — APPOINTMENT (OUTPATIENT)
Dept: ALLERGY | Facility: CLINIC | Age: 60
End: 2025-07-10
Payer: MEDICARE

## 2025-07-10 VITALS
HEART RATE: 69 BPM | OXYGEN SATURATION: 95 % | SYSTOLIC BLOOD PRESSURE: 124 MMHG | DIASTOLIC BLOOD PRESSURE: 74 MMHG | TEMPERATURE: 98.3 F

## 2025-07-10 PROCEDURE — 99214 OFFICE O/P EST MOD 30 MIN: CPT

## 2025-07-14 NOTE — BH TREATMENT PLAN - NSTXSUICIDINTERMD_PSY_ALL_CORE
Prednisone taper prescribed for systemic treatment - take as directed.  Can use an oral antihistamine to help with itching - Claritin, Zyrtec, or Allegra  Cool compresses, or epsom salt soaked compresses can also help.  Can use emollient based creams/lotions (Eucerin, Cetaphil, or Aquaphor) to help with skin healing.  Watch for signs of infection (such as swelling, increased tenderness, discharge, purulence, spreading red rash, heat), and return to the clinic or follow up with your PCP should any develop.  Follow up with your PCP within 7 days or, if unable, you can return to the clinic if symptoms persist beyond 7 days or if symptoms worsen.    New Prescriptions    PREDNISONE (DELTASONE) 20 MG TABLET    Take 2 tablets by mouth daily for 3 days, THEN 1.5 tablets daily for 3 days, THEN 1 tablet daily for 3 days, THEN 0.5 tablets daily for 3 days.    TRIAMCINOLONE (KENALOG) 0.1 % OINTMENT    Apply topically 2 times daily for 7 days       
psychopharmacology, groups

## 2025-07-21 NOTE — ADVANCED PRACTICE NURSE CONSULT - APN SPECIALTY LIST
"Ashley Roberson" Cl was seen and treated in our emergency department on 7/21/2025.  He may return to work on 07/22/2025.       If you have any questions or concerns, please don't hesitate to call.      Lemuel Reid PA-C" [facility]

## 2025-07-31 ENCOUNTER — APPOINTMENT (OUTPATIENT)
Dept: RHEUMATOLOGY | Facility: CLINIC | Age: 60
End: 2025-07-31
Payer: MEDICARE

## 2025-07-31 VITALS
DIASTOLIC BLOOD PRESSURE: 78 MMHG | SYSTOLIC BLOOD PRESSURE: 147 MMHG | RESPIRATION RATE: 16 BRPM | OXYGEN SATURATION: 97 % | HEART RATE: 68 BPM

## 2025-07-31 PROCEDURE — 96366 THER/PROPH/DIAG IV INF ADDON: CPT

## 2025-07-31 PROCEDURE — 96365 THER/PROPH/DIAG IV INF INIT: CPT

## 2025-07-31 RX ORDER — DIPHENHYDRAMINE HCL 25 MG/1
25 TABLET ORAL
Qty: 0 | Refills: 0 | Status: COMPLETED
Start: 2025-05-01

## 2025-07-31 RX ORDER — ACETAMINOPHEN 325 MG/1
325 TABLET ORAL
Qty: 0 | Refills: 0 | Status: COMPLETED
Start: 2025-05-01

## 2025-07-31 RX ORDER — IMMUNE GLOBULIN INTRAVENOUS (HUMAN) 10 G
10 KIT INTRAVENOUS
Qty: 0 | Refills: 0 | Status: COMPLETED
Start: 2025-03-27

## 2025-08-18 PROCEDURE — 90853 GROUP PSYCHOTHERAPY: CPT

## 2025-08-28 ENCOUNTER — APPOINTMENT (OUTPATIENT)
Dept: RHEUMATOLOGY | Facility: CLINIC | Age: 60
End: 2025-08-28
Payer: MEDICARE

## 2025-08-28 VITALS
HEART RATE: 55 BPM | DIASTOLIC BLOOD PRESSURE: 87 MMHG | RESPIRATION RATE: 16 BRPM | SYSTOLIC BLOOD PRESSURE: 154 MMHG | OXYGEN SATURATION: 97 %

## 2025-08-28 VITALS
OXYGEN SATURATION: 97 % | RESPIRATION RATE: 16 BRPM | TEMPERATURE: 97.7 F | HEART RATE: 59 BPM | DIASTOLIC BLOOD PRESSURE: 77 MMHG | SYSTOLIC BLOOD PRESSURE: 134 MMHG

## 2025-08-28 PROCEDURE — 96366 THER/PROPH/DIAG IV INF ADDON: CPT

## 2025-08-28 PROCEDURE — 96365 THER/PROPH/DIAG IV INF INIT: CPT

## 2025-08-28 RX ORDER — IMMUNE GLOBULIN INTRAVENOUS (HUMAN) 10 G
10 KIT INTRAVENOUS
Qty: 0 | Refills: 0 | Status: COMPLETED
Start: 2025-03-27

## 2025-08-28 RX ORDER — DIPHENHYDRAMINE HCL 25 MG/1
25 TABLET ORAL
Qty: 0 | Refills: 0 | Status: COMPLETED
Start: 2025-05-01

## 2025-08-28 RX ORDER — ACETAMINOPHEN 325 MG/1
325 TABLET ORAL
Qty: 0 | Refills: 0 | Status: COMPLETED
Start: 2025-05-01